# Patient Record
Sex: FEMALE | Race: BLACK OR AFRICAN AMERICAN | NOT HISPANIC OR LATINO | Employment: UNEMPLOYED | URBAN - METROPOLITAN AREA
[De-identification: names, ages, dates, MRNs, and addresses within clinical notes are randomized per-mention and may not be internally consistent; named-entity substitution may affect disease eponyms.]

---

## 2017-01-17 ENCOUNTER — TELEPHONE (OUTPATIENT)
Dept: GASTROENTEROLOGY | Facility: CLINIC | Age: 52
End: 2017-01-17

## 2017-01-17 ENCOUNTER — HOSPITAL ENCOUNTER (EMERGENCY)
Facility: HOSPITAL | Age: 52
Discharge: HOME | End: 2017-01-18
Attending: EMERGENCY MEDICINE
Payer: MEDICAID

## 2017-01-17 DIAGNOSIS — D64.9 SYMPTOMATIC ANEMIA: Primary | ICD-10-CM

## 2017-01-17 LAB
ABO + RH BLD: NORMAL
ALBUMIN SERPL BCP-MCNC: 2.9 G/DL
ALP SERPL-CCNC: 124 U/L
ALT SERPL W/O P-5'-P-CCNC: 13 U/L
ANION GAP SERPL CALC-SCNC: 8 MMOL/L
ANISOCYTOSIS BLD QL SMEAR: SLIGHT
APTT BLDCRRT: 29.4 SEC
AST SERPL-CCNC: 51 U/L
BASOPHILS # BLD AUTO: 0.03 K/UL
BASOPHILS NFR BLD: 0.5 %
BILIRUB SERPL-MCNC: 2.8 MG/DL
BLD GP AB SCN CELLS X3 SERPL QL: NORMAL
BUN SERPL-MCNC: 3 MG/DL
CALCIUM SERPL-MCNC: 8.2 MG/DL
CHLORIDE SERPL-SCNC: 106 MMOL/L
CO2 SERPL-SCNC: 26 MMOL/L
CREAT SERPL-MCNC: 0.6 MG/DL
DIFFERENTIAL METHOD: ABNORMAL
EOSINOPHIL # BLD AUTO: 0.1 K/UL
EOSINOPHIL NFR BLD: 1.3 %
ERYTHROCYTE [DISTWIDTH] IN BLOOD BY AUTOMATED COUNT: 19.1 %
EST. GFR  (AFRICAN AMERICAN): >60 ML/MIN/1.73 M^2
EST. GFR  (NON AFRICAN AMERICAN): >60 ML/MIN/1.73 M^2
GLUCOSE SERPL-MCNC: 104 MG/DL
HCT VFR BLD AUTO: 21.6 %
HGB BLD-MCNC: 6.1 G/DL
HYPOCHROMIA BLD QL SMEAR: ABNORMAL
INR PPP: 1.4
LYMPHOCYTES # BLD AUTO: 2.4 K/UL
LYMPHOCYTES NFR BLD: 39.4 %
MCH RBC QN AUTO: 16.8 PG
MCHC RBC AUTO-ENTMCNC: 28.2 %
MCV RBC AUTO: 60 FL
MONOCYTES # BLD AUTO: 0.6 K/UL
MONOCYTES NFR BLD: 9 %
NEUTROPHILS # BLD AUTO: 3 K/UL
NEUTROPHILS NFR BLD: 50 %
PLATELET # BLD AUTO: 76 K/UL
PLATELET BLD QL SMEAR: ABNORMAL
PMV BLD AUTO: ABNORMAL FL
POIKILOCYTOSIS BLD QL SMEAR: SLIGHT
POTASSIUM SERPL-SCNC: 3 MMOL/L
PROT SERPL-MCNC: 7.5 G/DL
PROTHROMBIN TIME: 13.9 SEC
RBC # BLD AUTO: 3.63 M/UL
SODIUM SERPL-SCNC: 140 MMOL/L
TARGETS BLD QL SMEAR: ABNORMAL
WBC # BLD AUTO: 6.09 K/UL

## 2017-01-17 PROCEDURE — 86920 COMPATIBILITY TEST SPIN: CPT

## 2017-01-17 PROCEDURE — 36430 TRANSFUSION BLD/BLD COMPNT: CPT

## 2017-01-17 PROCEDURE — 86901 BLOOD TYPING SEROLOGIC RH(D): CPT

## 2017-01-17 PROCEDURE — 85025 COMPLETE CBC W/AUTO DIFF WBC: CPT

## 2017-01-17 PROCEDURE — 80053 COMPREHEN METABOLIC PANEL: CPT

## 2017-01-17 PROCEDURE — 25000003 PHARM REV CODE 250

## 2017-01-17 PROCEDURE — 86900 BLOOD TYPING SEROLOGIC ABO: CPT

## 2017-01-17 PROCEDURE — 99284 EMERGENCY DEPT VISIT MOD MDM: CPT | Mod: 25

## 2017-01-17 PROCEDURE — 85730 THROMBOPLASTIN TIME PARTIAL: CPT

## 2017-01-17 PROCEDURE — P9016 RBC LEUKOCYTES REDUCED: HCPCS

## 2017-01-17 PROCEDURE — 85610 PROTHROMBIN TIME: CPT

## 2017-01-17 RX ORDER — HYDROCODONE BITARTRATE AND ACETAMINOPHEN 500; 5 MG/1; MG/1
TABLET ORAL
Status: DISCONTINUED | OUTPATIENT
Start: 2017-01-17 | End: 2017-01-18 | Stop reason: HOSPADM

## 2017-01-17 NOTE — ED AVS SNAPSHOT
[facility]  [id number] [place] [id number]               Marcie Bazan   2017  8:16 PM   ED    Description:  Female : 1965   Department:  [facility]           Your Care was Coordinated By:     Provider Role From To    Gurdeep Koehler MD Attending Provider 17 --      Reason for Visit     Anemia           Diagnoses this Visit        Comments    Symptomatic anemia    -  Primary       ED Disposition     ED Disposition Condition Comment    Discharge             To Do List           Follow-up Information     Follow up with Joselin Souza MD In 2 days.    Specialty:  Gastroenterology    Contact information:    [address]  [facility] [address]  [phone]          Follow up with [facility].    Specialty:  Emergency Medicine    Why:  As needed, If symptoms worsen    Contact information:    [address] [place] [id number]  [phone]      [facility]     [facility] -  Assistance  Registered nurses in the [facility] provide clinical advisement, health education, appointment booking, and other advisory services.  Call for this free service at [phone].             Medications           Message regarding Medications     Verify the changes and/or additions to your medication regime listed below are the same as discussed with your clinician today.  If any of these changes or additions are incorrect, please notify your healthcare provider.        These medications were administered today        Dose Freq    0.9%  NaCl infusion (for blood administration)  Every 24 hours as needed    Sig: Inject into the vein every 24 hours as needed for Blood Admin.    Class: Normal    Route: Intravenous      STOP taking these medications     erythromycin (ROMYCIN) ophthalmic ointment Place a 1/2 inch ribbon of ointment into the lower eyelid QID x 7 days     "potassium chloride (KLOR-CON) 10 MEQ TbSR Take 1 tablet (10 mEq total) by mouth once daily.    amoxicillin (AMOXIL) 500 MG capsule     clarithromycin (BIAXIN) 500 MG tablet     hydrochlorothiazide (HYDRODIURIL) 25 MG tablet Take 25 mg by mouth once daily.           Verify that the below list of medications is an accurate representation of the medications you are currently taking.  If none reported, the list may be blank. If incorrect, please contact your healthcare provider. Carry this list with you in case of emergency.           Current Medications     cyclobenzaprine (FLEXERIL) 10 MG tablet Take 10 mg by mouth.    omeprazole (PRILOSEC) 20 MG capsule Take 20 mg by mouth.    0.9%  NaCl infusion (for blood administration) Inject into the vein every 24 hours as needed for Blood Admin.    gabapentin (NEURONTIN) 300 MG capsule Take 300 mg by mouth.    lisinopril (PRINIVIL,ZESTRIL) 5 MG tablet Take 5 mg by mouth.           Clinical Reference Information           Your Vitals Were     BP Pulse Temp Resp Height Weight    144/73 79 98.5 °F (36.9 °C) (Oral) 18 5' 5" (1.651 m) 72.6 kg (160 lb)    SpO2 BMI             99% 26.63 kg/m2         Allergies as of 1/18/2017     No Known Allergies      Immunizations Administered on Date of Encounter - 1/18/2017     None      ED Micro, Lab, POCT     Start Ordered       Status Ordering Provider    01/17/17 2029 01/17/17 2028  CBC auto differential  STAT      Final result     01/17/17 2029 01/17/17 2028  Comprehensive metabolic panel  STAT      Final result     01/17/17 2029 01/17/17 2028  Protime-INR  STAT      Final result     01/17/17 2029 01/17/17 2028  APTT  STAT      Final result       ED Imaging Orders     None        Discharge Instructions         Anemia  Anemia is a condition that occurs when your body does not have enough healthy red blood cells (RBCs). Your RBCs are the parts of your blood that carry oxygen throughout your body. A protein called hemoglobin allows your RBCs to " absorb and release oxygen. Without enough RBCs or hemoglobin, your body doesn't get enough oxygen. Symptoms of anemia may then occur.    Symptoms of anemia  Some people with anemia have no symptoms. But most people have symptoms that range from mild to severe. These can include:  · Tiredness (fatigue)  · Weakness  · Pale skin  · Shortness of breath  · Dizziness or fainting  · Rapid heartbeat  · Trouble doing normal amounts of activity  · Jaundice (yellowing of your eyes, skin, or mouth; dark urine)  Causes of anemia  Anemia can occur when your body:  · Loses too much blood  · Does not make enough RBCs  · Destroys your RBCs at a faster rate than it can replace them  · Does not make a normal amount of hemoglobin in your RBCs  These problems can occur for many reasons, including:  · A condition that you are born with (congenital or inherited). This includes sickle cell disease or thalassemia.  · Heavy bleeding for any reason, including injury, surgery, childbirth, or even heavy menstrual periods.  · Being low in certain nutrients, such as iron, folate, or vitamin B12. This may be due to poor diet. Also, a condition like celiac disease or Crohn's disease can cause poor absorption of these nutrients  · Certain chronic conditions like diabetes, arthritis, or kidney disease.  · Certain chronic infections like tuberculosis or HIV.  · Exposure to certain medications, such as those used for chemotherapy.  There are different types of anemia. Your doctor can tell you more about the type of anemia you have and what may have caused it.  Diagnosing anemia  To diagnose anemia, your doctor gives you blood tests. These can include:  · Complete blood cell count (CBC). This test measures the amounts of the different types of blood cells.  · Blood smear. This test checks the size and shape of your blood cells. To perform the test, your doctor views a drop of your blood under a microscope. Your doctor uses a stain to make the blood  cells easier to see.  · Iron studies. These tests measure the amount of iron in your blood. Your body needs iron to make hemoglobin in your RBCs.  · Vitamin B12 and folate studies. These tests check for some of the components that help give RBCs a normal size and shape.  · Reticulocyte count. This test measures the amount of new RBCs that your bone marrow makes.  · Hemoglobin electrophoresis. This test checks for problems with your hemoglobin in RBCs.  Treating anemia  Treatment for anemia is based on the type of anemia, its cause, and the severity of your symptoms. Treatments may include:  · Diet changes. This involves increasing the amount of certain nutrients in your diet, such as iron, vitamin B12, or folate. Your doctor may also prescribe nutrient supplements.  · Medications. Certain medications treat the cause of your anemia. Others help build new RBCs or relieve symptoms. If a medication is the cause of your anemia, you may need to stop or change it.  · Blood transfusions. Replacing some of your blood can increase the number of healthy RBCs in your body.  · Surgery. In some cases, your doctor can do surgery to treat the underlying cause of anemia. If you need surgery, your doctor will explain the procedure and outline the risks and benefits for you.  Long-term concerns  If you have a certain type of anemia, you can expect a full recovery after treatment. If you have other types of anemia (especially a type you're born with), you will need to manage it for life. Your doctor can tell you more.  © 8080-7030 [employer]. [address] [place] [address], [place] [address]. All rights reserved. This information is not intended as a substitute for professional medical care. Always follow your healthcare professional's instructions.          Blood Transfusion Reaction Signs and Symptoms     The blood you have received has been matched for you as carefully as possible. Most patients who receive a blood  transfusion do not experience problems. However, there can be a delayed reaction that happens a few weeks after your blood transfusion. Contact your physician immediately if you experience any NEW SYMPTOMS listed below:     Fever greater than 100.4 degrees    Chills   Yellow color to your skin or eyes(Jaundice)   Back pain, chest pain, or pain at the infusion site   Weakness (more than usual)   Discomfort or uneasiness more than usual (Malaise)   Nausea or vomiting   Shortness of breath, wheezing, or coughing   Higher or lower blood pressure than normal   Skin rash, itching, skin redness, or localized swelling (example: hands or feet)   Urinating less than normal   Urine appears reddish or orange and is darker than normal      Remember that some these signs may already exist for you--such as having chronic back pain or high blood pressure. You only need to look for and report to your doctor any new occurrences since your blood transfusion that are of concern.        [facility] Sign-Up     Activating your [facility] account is as easy as 1-2-3!     1) Visit [email or web address], select Sign Up Now, enter this activation code and your date of birth, then select Next.  [email or web address]  Expires: 3/4/2017  1:10 AM      2) Create a username and password to use when you visit [facility] in the future and select a security question in case you lose your password and select Next.    3) Enter your e-mail address and click Sign Up!    Additional Information  If you have questions, please e-mail [email or web address] or call [phone] to talk to our [facility] staff. Remember, [facility] is NOT to be used for urgent needs. For medical emergencies, dial 911.          [facility] complies with applicable Federal civil rights laws and does not discriminate on the basis of race, color, national origin, age, disability, or sex.        Language Assistance Services     ATTENTION: Language assistance services are  available, free of charge. Please call [phone].      ATENCIÓN: Si habla español, tiene a pearson disposición servicios gratuitos de asistencia lingüística. Llame al [phone].     CHÚ Ý: N?u b?n nói Ti?ng Vi?t, có các d?ch v? h? tr? ngôn ng? mi?n phí dành cho b?n. G?i s? [phone].

## 2017-01-17 NOTE — TELEPHONE ENCOUNTER
Spoke with patient about results from [facility] clinic of Hb 6.7 she is symptomatic with SOB with PERKINS cirrhosis so advised she go to the ED for eval. Denies GIB but reports nosebleeds and hemoptysis.  She will go to the [facility] near where she lives.      [facility]  EGD 7/2016-esophagitis, small esophageal varices; dudoenitis    Colon 4/2015- diminutive polyps, hemorrhoids

## 2017-01-18 ENCOUNTER — TELEPHONE (OUTPATIENT)
Dept: GASTROENTEROLOGY | Facility: CLINIC | Age: 52
End: 2017-01-18

## 2017-01-18 VITALS
HEIGHT: 65 IN | HEART RATE: 81 BPM | WEIGHT: 160 LBS | SYSTOLIC BLOOD PRESSURE: 152 MMHG | OXYGEN SATURATION: 99 % | TEMPERATURE: 99 F | BODY MASS INDEX: 26.66 KG/M2 | DIASTOLIC BLOOD PRESSURE: 70 MMHG | RESPIRATION RATE: 20 BRPM

## 2017-01-18 LAB
BLD PROD TYP BPU: NORMAL
BLD PROD TYP BPU: NORMAL
BLOOD UNIT EXPIRATION DATE: NORMAL
BLOOD UNIT EXPIRATION DATE: NORMAL
BLOOD UNIT TYPE CODE: 600
BLOOD UNIT TYPE CODE: 600
BLOOD UNIT TYPE: NORMAL
BLOOD UNIT TYPE: NORMAL
CODING SYSTEM: NORMAL
CODING SYSTEM: NORMAL
DISPENSE STATUS: NORMAL
DISPENSE STATUS: NORMAL
NUM UNITS TRANS PACKED RBC: NORMAL
NUM UNITS TRANS PACKED RBC: NORMAL

## 2017-01-18 PROCEDURE — 25000003 PHARM REV CODE 250: Performed by: EMERGENCY MEDICINE

## 2017-01-18 PROCEDURE — P9016 RBC LEUKOCYTES REDUCED: HCPCS

## 2017-01-18 RX ORDER — ONDANSETRON 4 MG/1
4 TABLET, ORALLY DISINTEGRATING ORAL
Status: COMPLETED | OUTPATIENT
Start: 2017-01-18 | End: 2017-01-18

## 2017-01-18 RX ADMIN — ONDANSETRON 4 MG: 4 TABLET, ORALLY DISINTEGRATING ORAL at 02:01

## 2017-01-18 NOTE — ED PROVIDER NOTES
"SCRIBE #1 NOTE: I, José Adams, am scribing for, and in the presence of, Gurdeep Koehler MD. I have scribed the entire note.      History      Chief Complaint   Patient presents with    Anemia     " i think in need a blood transfusion" reports fatigue       Review of patient's allergies indicates:  No Known Allergies     HPI   HPI    1/17/2017, 8:26 PM   History obtained from the patient      History of Present Illness: Marcie Bazan is a 51 y.o. female patient who presents to the Emergency Department, sent by Dr. Souza () for possible blood transfusion. Pt has been complaining of fatigue which onset gradually 1 week ago. Sxs are constant and moderate in severity. There are no mitigating or exacerbating factors noted. Associated sxs include episodic nosebleeds.  Pt denies any fever, N/V/D, blood in stool, hematuria, vaginal bleeding, hematemesis, HA, CP, abd pain, and all other sxs at this time. Pt reports having blood drawn at [facility] clinic on 1/10 and was called today to come to ED for hemoglobin of 6.7. No further complaints or concerns at this time.     Arrival mode: Personal vehicle     PCP: ODILIA Wall       Past Medical History:  Past Medical History   Diagnosis Date    Hypertension        Past Surgical History:  Past Surgical History   Procedure Laterality Date    Cholecystectomy      Hysterectomy      Breast surgery           Family History:  Family History   Problem Relation Age of Onset    Hypertension Mother     Hypertension Father     Diabetes Father     Diabetes Sister     Depression Maternal Grandfather        Social History:  Social History     Social History Main Topics    Smoking status: Never Smoker    Smokeless tobacco: Unknown    Alcohol use Yes      Comment: occasionally    Drug use: No    Sexual activity: Unknown       ROS   Review of Systems   Constitutional: Positive for fatigue. Negative for fever.   HENT: Positive for nosebleeds. Negative for sore " throat.    Respiratory: Negative for shortness of breath.    Cardiovascular: Negative for chest pain.   Gastrointestinal: Negative for abdominal pain, blood in stool, diarrhea, nausea and vomiting.   Genitourinary: Negative for dysuria, hematuria and vaginal bleeding.   Musculoskeletal: Negative for back pain.   Skin: Negative for rash.   Neurological: Negative for weakness.   Hematological: Does not bruise/bleed easily.       Physical Exam    Initial Vitals   BP Pulse Resp Temp SpO2   01/17/17 1911 01/17/17 1911 01/17/17 1911 01/17/17 1911 01/17/17 1911   149/78 98 19 98.9 °F (37.2 °C) 100 %      Physical Exam  Nursing Notes and Vital Signs Reviewed.  Constitutional: Patient is in no acute distress. Awake and alert. Well-developed and well-nourished.  Head: Atraumatic. Normocephalic.  Eyes: PERRL. EOM intact. Conjunctivae are not pale. No scleral icterus.  ENT: Mucous membranes are moist. Oropharynx is clear and symmetric.    Neck: Supple. Full ROM. No lymphadenopathy.  Cardiovascular: Regular rate. Regular rhythm. No murmurs, rubs, or gallops. Distal pulses are 2+ and symmetric.  Pulmonary/Chest: No respiratory distress. Clear to auscultation bilaterally. No wheezing, rales, or rhonchi.  Abdominal: Soft and non-distended.  There is no tenderness.  No rebound, guarding, or rigidity.  Good bowel sounds.    Genitourinary: No CVA tenderness  Musculoskeletal: Moves all extremities. No obvious deformities. No edema. No calf tenderness.  Skin: Warm and dry. Pale.   Neurological:  Alert, awake, and appropriate.  Normal speech.  No acute focal neurological deficits are appreciated.  Psychiatric: Normal affect. Good eye contact. Appropriate in content.    ED Course    Critical Care  Date/Time: 1/17/2017 11:45 PM  Performed by: EVELIA ALDANA.  Authorized by: EVELIA ALDANA.   Direct patient critical care time: 15 minutes  Additional history critical care time: 10 minutes  Ordering / reviewing critical care time: 5  "minutes  Documentation critical care time: 10 minutes  Total critical care time (exclusive of procedural time) : 40 minutes  Critical care time was exclusive of separately billable procedures and treating other patients and teaching time.  Critical care was necessary to treat or prevent imminent or life-threatening deterioration of the following conditions: circulatory failure (anemia).  Critical care was time spent personally by me on the following activities: blood draw for specimens, development of treatment plan with patient or surrogate, interpretation of cardiac output measurements, evaluation of patient's response to treatment, examination of patient, obtaining history from patient or surrogate, ordering and performing treatments and interventions, ordering and review of laboratory studies, pulse oximetry, re-evaluation of patient's condition, review of old charts and vascular access procedures.        ED Vital Signs:  Vitals:    01/17/17 1911 01/17/17 2100 01/17/17 2104 01/17/17 2209   BP: (!) 149/78  137/71 138/85   Pulse: 98 81 81 79   Resp: 19 17 19   Temp: 98.9 °F (37.2 °C)      TempSrc: Oral      SpO2: 100%  100% 100%   Weight: 72.6 kg (160 lb)      Height: 5' 5" (1.651 m)       01/17/17 2240 01/17/17 2255 01/17/17 2341 01/18/17 0040   BP: (!) 150/73 132/67 (!) 153/79 (!) 153/79   Pulse: 78 79 88 78   Resp: 20 18 17 19   Temp: 98.4 °F (36.9 °C) 98.5 °F (36.9 °C) 98.8 °F (37.1 °C) 98.4 °F (36.9 °C)   TempSrc: Oral Oral Oral Oral   SpO2: 100% 100% 99% 100%   Weight:       Height:        01/18/17 0045 01/18/17 0101 01/18/17 0145 01/18/17 0241   BP: (!) 150/74 (!) 144/73 (!) 144/80 (!) 166/84   Pulse: 80 79 83 85   Resp: 20 18 19 14   Temp: 98.4 °F (36.9 °C) 98.5 °F (36.9 °C) 98.6 °F (37 °C)    TempSrc: Oral Oral Oral    SpO2: 100% 99% 99% 98%   Weight:       Height:        01/18/17 0324 01/18/17 0342   BP: (!) 156/82 (!) 152/70   Pulse: 82 81   Resp: 18 20   Temp: 98.7 °F (37.1 °C)    TempSrc: Oral  "   SpO2: 100% 99%   Weight:     Height:         Abnormal Lab Results:  Labs Reviewed   CBC W/ AUTO DIFFERENTIAL - Abnormal; Notable for the following:        Result Value    RBC 3.63 (*)     Hemoglobin 6.1 (*)     Hematocrit 21.6 (*)     MCV 60 (*)     MCH 16.8 (*)     MCHC 28.2 (*)     RDW 19.1 (*)     Platelets 76 (*)     Platelet Estimate Decreased (*)     All other components within normal limits   COMPREHENSIVE METABOLIC PANEL - Abnormal; Notable for the following:     Potassium 3.0 (*)     BUN, Bld 3 (*)     Calcium 8.2 (*)     Albumin 2.9 (*)     Total Bilirubin 2.8 (*)     AST 51 (*)     All other components within normal limits   PROTIME-INR - Abnormal; Notable for the following:     Prothrombin Time 13.9 (*)     INR 1.4 (*)     All other components within normal limits   APTT   TYPE & SCREEN   PREPARE RBC SOFT        All Lab Results:  Results for orders placed or performed during the hospital encounter of 01/17/17   CBC auto differential   Result Value Ref Range    WBC 6.09 3.90 - 12.70 K/uL    RBC 3.63 (L) 4.00 - 5.40 M/uL    Hemoglobin 6.1 (L) 12.0 - 16.0 g/dL    Hematocrit 21.6 (L) 37.0 - 48.5 %    MCV 60 (L) 82 - 98 fL    MCH 16.8 (L) 27.0 - 31.0 pg    MCHC 28.2 (L) 32.0 - 36.0 %    RDW 19.1 (H) 11.5 - 14.5 %    Platelets 76 (L) 150 - 350 K/uL    MPV SEE COMMENT 9.2 - 12.9 fL    Gran # 3.0 1.8 - 7.7 K/uL    Lymph # 2.4 1.0 - 4.8 K/uL    Mono # 0.6 0.3 - 1.0 K/uL    Eos # 0.1 0.0 - 0.5 K/uL    Baso # 0.03 0.00 - 0.20 K/uL    Gran% 50.0 38.0 - 73.0 %    Lymph% 39.4 18.0 - 48.0 %    Mono% 9.0 4.0 - 15.0 %    Eosinophil% 1.3 0.0 - 8.0 %    Basophil% 0.5 0.0 - 1.9 %    Platelet Estimate Decreased (A)     Aniso Slight     Poik Slight     Hypo Moderate     Target Cells Occasional     Differential Method Automated    Comprehensive metabolic panel   Result Value Ref Range    Sodium 140 136 - 145 mmol/L    Potassium 3.0 (L) 3.5 - 5.1 mmol/L    Chloride 106 95 - 110 mmol/L    CO2 26 23 - 29 mmol/L    Glucose 104  70 - 110 mg/dL    BUN, Bld 3 (L) 6 - 20 mg/dL    Creatinine 0.6 0.5 - 1.4 mg/dL    Calcium 8.2 (L) 8.7 - 10.5 mg/dL    Total Protein 7.5 6.0 - 8.4 g/dL    Albumin 2.9 (L) 3.5 - 5.2 g/dL    Total Bilirubin 2.8 (H) 0.1 - 1.0 mg/dL    Alkaline Phosphatase 124 55 - 135 U/L    AST 51 (H) 10 - 40 U/L    ALT 13 10 - 44 U/L    Anion Gap 8 8 - 16 mmol/L    eGFR if African American >60 >60 mL/min/1.73 m^2    eGFR if non African American >60 >60 mL/min/1.73 m^2   Protime-INR   Result Value Ref Range    Prothrombin Time 13.9 (H) 9.0 - 12.5 sec    INR 1.4 (H) 0.8 - 1.2   APTT   Result Value Ref Range    aPTT 29.4 21.0 - 32.0 sec   Type & Screen   Result Value Ref Range    Group & Rh A NEG     Indirect Donato NEG    Prepare RBC 2 Units   Result Value Ref Range    UNIT NUMBER [id number]     PRODUCT CODE U7092Z66     DISPENSE STATUS TRANSFUSED     CODING SYSTEM PUFU947     Unit Blood Type Code 0600     Unit Blood Type A NEG     Unit Expiration [id number]     UNIT NUMBER [id number]     PRODUCT CODE P8510J37     DISPENSE STATUS ISSUED     CODING SYSTEM [id number]     Unit Blood Type Code 0600     Unit Blood Type A NEG     Unit Expiration [id number]                The Emergency Provider reviewed the vital signs and test results, which are outlined above.    ED Discussion     12:11 AM: Reassessed pt. Pt states their condition has improved at this time.  Discussed with pt all pertinent ED information and results. Discussed plan of treatment with pt. Gave pt all f/u and return to the ED instructions. All questions and concerns were addressed at this time. Pt understands and agrees to plan as discussed. Pt is stable for discharge.       ED Medication(s):  Medications   ondansetron disintegrating tablet 4 mg (4 mg Oral Given 1/18/17 0244)       Discharge Medication List as of 1/18/2017  1:10 AM          Follow-up Information     Follow up with Joselin Souza MD In 2 days.    Specialty:  Gastroenterology    Contact information:    [address]  [facility] [address]  [phone]          Follow up with [facility].    Specialty:  Emergency Medicine    Why:  As needed, If symptoms worsen    Contact information:    [address]  [facility] [address] [phone]            Medical Decision Making    Medical Decision Making:   Clinical Tests:   Lab Tests: Ordered and Reviewed           Scribe Attestation:   Scribe #1: I performed the above scribed service and the documentation accurately describes the services I performed. I attest to the accuracy of the note.    Attending:   Physician Attestation Statement for Scribe #1: I, uGrdeep Koehler MD, personally performed the services described in this documentation, as scribed by José Adams, in my presence, and it is both accurate and complete.          Clinical Impression       ICD-10-CM ICD-9-CM   1. Symptomatic anemia D64.9 285.9       Disposition:   Disposition: Discharged  Condition: Stable         Gurdeep Koehler MD  01/18/17 2031

## 2017-01-18 NOTE — DISCHARGE INSTRUCTIONS
Anemia  Anemia is a condition that occurs when your body does not have enough healthy red blood cells (RBCs). Your RBCs are the parts of your blood that carry oxygen throughout your body. A protein called hemoglobin allows your RBCs to absorb and release oxygen. Without enough RBCs or hemoglobin, your body doesn't get enough oxygen. Symptoms of anemia may then occur.    Symptoms of anemia  Some people with anemia have no symptoms. But most people have symptoms that range from mild to severe. These can include:  · Tiredness (fatigue)  · Weakness  · Pale skin  · Shortness of breath  · Dizziness or fainting  · Rapid heartbeat  · Trouble doing normal amounts of activity  · Jaundice (yellowing of your eyes, skin, or mouth; dark urine)  Causes of anemia  Anemia can occur when your body:  · Loses too much blood  · Does not make enough RBCs  · Destroys your RBCs at a faster rate than it can replace them  · Does not make a normal amount of hemoglobin in your RBCs  These problems can occur for many reasons, including:  · A condition that you are born with (congenital or inherited). This includes sickle cell disease or thalassemia.  · Heavy bleeding for any reason, including injury, surgery, childbirth, or even heavy menstrual periods.  · Being low in certain nutrients, such as iron, folate, or vitamin B12. This may be due to poor diet. Also, a condition like celiac disease or Crohn's disease can cause poor absorption of these nutrients  · Certain chronic conditions like diabetes, arthritis, or kidney disease.  · Certain chronic infections like tuberculosis or HIV.  · Exposure to certain medications, such as those used for chemotherapy.  There are different types of anemia. Your doctor can tell you more about the type of anemia you have and what may have caused it.  Diagnosing anemia  To diagnose anemia, your doctor gives you blood tests. These can include:  · Complete blood cell count (CBC). This test measures the amounts  of the different types of blood cells.  · Blood smear. This test checks the size and shape of your blood cells. To perform the test, your doctor views a drop of your blood under a microscope. Your doctor uses a stain to make the blood cells easier to see.  · Iron studies. These tests measure the amount of iron in your blood. Your body needs iron to make hemoglobin in your RBCs.  · Vitamin B12 and folate studies. These tests check for some of the components that help give RBCs a normal size and shape.  · Reticulocyte count. This test measures the amount of new RBCs that your bone marrow makes.  · Hemoglobin electrophoresis. This test checks for problems with your hemoglobin in RBCs.  Treating anemia  Treatment for anemia is based on the type of anemia, its cause, and the severity of your symptoms. Treatments may include:  · Diet changes. This involves increasing the amount of certain nutrients in your diet, such as iron, vitamin B12, or folate. Your doctor may also prescribe nutrient supplements.  · Medications. Certain medications treat the cause of your anemia. Others help build new RBCs or relieve symptoms. If a medication is the cause of your anemia, you may need to stop or change it.  · Blood transfusions. Replacing some of your blood can increase the number of healthy RBCs in your body.  · Surgery. In some cases, your doctor can do surgery to treat the underlying cause of anemia. If you need surgery, your doctor will explain the procedure and outline the risks and benefits for you.  Long-term concerns  If you have a certain type of anemia, you can expect a full recovery after treatment. If you have other types of anemia (especially a type you're born with), you will need to manage it for life. Your doctor can tell you more.  © 7744-0882 [employer]. [address] [place] [address], [place] [address]. All rights reserved. This information is not intended as a substitute for professional medical care. Always  follow your healthcare professional's instructions.

## 2017-01-18 NOTE — ED NOTES
Pt to ED after being told by doctor her H&H was low and she would need a transfusion. Patient reports being non compliant with blood pressure medication and potassium supplement

## 2017-01-18 NOTE — ED NOTES
Pt resting in bed. NAD, VSS, RR equal and unlabored. Will continue to monitor. Blood transfusion currently in progress.

## 2017-01-18 NOTE — ED NOTES
Patient in bed in no acute distress, post transfusion flush currently in process. Patient arranging transportation for discharge. Will continue to monitor.

## 2017-01-20 ENCOUNTER — TELEPHONE (OUTPATIENT)
Dept: GASTROENTEROLOGY | Facility: CLINIC | Age: 52
End: 2017-01-20

## 2017-01-20 NOTE — TELEPHONE ENCOUNTER
----- Message from Liza Anguiano sent at 1/18/2017  9:06 AM CST -----  Contact: pt  Call pt at [phone]//pt was discharge from the hospital this morning and was told to call to see Dr Souza/Dr Souza was the one to tell her to go to hospital yesterday//thks malachi

## 2017-01-20 NOTE — TELEPHONE ENCOUNTER
Returned call to pt who stated she does not need a f/u appt at this time but was a little confused about her medications. Pt asked what each of her medications were for and verbalized understanding of explanation. Pt will call to schedule f/u when she is ready.

## 2017-05-08 ENCOUNTER — HOSPITAL ENCOUNTER (EMERGENCY)
Facility: HOSPITAL | Age: 52
Discharge: HOME | End: 2017-05-09
Payer: MEDICAID

## 2017-05-08 VITALS
BODY MASS INDEX: 29.32 KG/M2 | WEIGHT: 176 LBS | RESPIRATION RATE: 20 BRPM | HEIGHT: 65 IN | TEMPERATURE: 99 F | OXYGEN SATURATION: 97 % | DIASTOLIC BLOOD PRESSURE: 84 MMHG | SYSTOLIC BLOOD PRESSURE: 145 MMHG | HEART RATE: 74 BPM

## 2017-05-08 DIAGNOSIS — K29.70 GASTRITIS WITHOUT BLEEDING, UNSPECIFIED CHRONICITY, UNSPECIFIED GASTRITIS TYPE: Primary | ICD-10-CM

## 2017-05-08 DIAGNOSIS — R10.9 ABDOMINAL PAIN: ICD-10-CM

## 2017-05-08 DIAGNOSIS — D63.8 ANEMIA OF CHRONIC DISORDER: ICD-10-CM

## 2017-05-08 LAB
ALBUMIN SERPL BCP-MCNC: 3 G/DL
ALP SERPL-CCNC: 141 U/L
ALT SERPL W/O P-5'-P-CCNC: 22 U/L
AMYLASE SERPL-CCNC: 35 U/L
ANION GAP SERPL CALC-SCNC: 11 MMOL/L
ANISOCYTOSIS BLD QL SMEAR: SLIGHT
AST SERPL-CCNC: 70 U/L
BASOPHILS # BLD AUTO: 0.03 K/UL
BASOPHILS NFR BLD: 0.3 %
BILIRUB SERPL-MCNC: 7.6 MG/DL
BUN SERPL-MCNC: 7 MG/DL
CALCIUM SERPL-MCNC: 8.5 MG/DL
CHLORIDE SERPL-SCNC: 100 MMOL/L
CO2 SERPL-SCNC: 26 MMOL/L
CREAT SERPL-MCNC: 0.7 MG/DL
DIFFERENTIAL METHOD: ABNORMAL
EOSINOPHIL # BLD AUTO: 0 K/UL
EOSINOPHIL NFR BLD: 0.3 %
ERYTHROCYTE [DISTWIDTH] IN BLOOD BY AUTOMATED COUNT: 20 %
EST. GFR  (AFRICAN AMERICAN): >60 ML/MIN/1.73 M^2
EST. GFR  (NON AFRICAN AMERICAN): >60 ML/MIN/1.73 M^2
GLUCOSE SERPL-MCNC: 109 MG/DL
HCT VFR BLD AUTO: 26.8 %
HGB BLD-MCNC: 8.1 G/DL
HYPOCHROMIA BLD QL SMEAR: ABNORMAL
LIPASE SERPL-CCNC: 5 U/L
LYMPHOCYTES # BLD AUTO: 1.8 K/UL
LYMPHOCYTES NFR BLD: 19.1 %
MCH RBC QN AUTO: 19.6 PG
MCHC RBC AUTO-ENTMCNC: 30.2 %
MCV RBC AUTO: 65 FL
MONOCYTES # BLD AUTO: 0.7 K/UL
MONOCYTES NFR BLD: 7.2 %
NEUTROPHILS # BLD AUTO: 7 K/UL
NEUTROPHILS NFR BLD: 73.3 %
PLATELET # BLD AUTO: 76 K/UL
PLATELET BLD QL SMEAR: ABNORMAL
PMV BLD AUTO: ABNORMAL FL
POIKILOCYTOSIS BLD QL SMEAR: ABNORMAL
POLYCHROMASIA BLD QL SMEAR: ABNORMAL
POTASSIUM SERPL-SCNC: 3.3 MMOL/L
PROT SERPL-MCNC: 8.4 G/DL
RBC # BLD AUTO: 4.14 M/UL
SODIUM SERPL-SCNC: 137 MMOL/L
TARGETS BLD QL SMEAR: ABNORMAL
WBC # BLD AUTO: 9.6 K/UL

## 2017-05-08 PROCEDURE — 82150 ASSAY OF AMYLASE: CPT

## 2017-05-08 PROCEDURE — 85025 COMPLETE CBC W/AUTO DIFF WBC: CPT

## 2017-05-08 PROCEDURE — 83690 ASSAY OF LIPASE: CPT

## 2017-05-08 PROCEDURE — 96361 HYDRATE IV INFUSION ADD-ON: CPT

## 2017-05-08 PROCEDURE — 99284 EMERGENCY DEPT VISIT MOD MDM: CPT | Mod: 25

## 2017-05-08 PROCEDURE — 25000003 PHARM REV CODE 250: Performed by: PHYSICIAN ASSISTANT

## 2017-05-08 PROCEDURE — 80053 COMPREHEN METABOLIC PANEL: CPT

## 2017-05-08 PROCEDURE — 63600175 PHARM REV CODE 636 W HCPCS: Performed by: PHYSICIAN ASSISTANT

## 2017-05-08 PROCEDURE — 96374 THER/PROPH/DIAG INJ IV PUSH: CPT

## 2017-05-08 RX ORDER — ONDANSETRON 4 MG/1
4 TABLET, FILM COATED ORAL EVERY 12 HOURS PRN
Qty: 30 TABLET | Refills: 0 | Status: SHIPPED | OUTPATIENT
Start: 2017-05-08 | End: 2017-09-11 | Stop reason: ALTCHOICE

## 2017-05-08 RX ORDER — PROMETHAZINE HYDROCHLORIDE 25 MG/1
25 TABLET ORAL EVERY 6 HOURS PRN
Qty: 30 TABLET | Refills: 0 | Status: SHIPPED | OUTPATIENT
Start: 2017-05-08 | End: 2017-09-11 | Stop reason: ALTCHOICE

## 2017-05-08 RX ORDER — DICYCLOMINE HYDROCHLORIDE 20 MG/1
20 TABLET ORAL 2 TIMES DAILY
Qty: 20 TABLET | Refills: 0 | Status: SHIPPED | OUTPATIENT
Start: 2017-05-08 | End: 2017-06-07

## 2017-05-08 RX ORDER — ONDANSETRON 2 MG/ML
4 INJECTION INTRAMUSCULAR; INTRAVENOUS
Status: COMPLETED | OUTPATIENT
Start: 2017-05-08 | End: 2017-05-08

## 2017-05-08 RX ADMIN — ONDANSETRON 4 MG: 2 INJECTION INTRAMUSCULAR; INTRAVENOUS at 09:05

## 2017-05-08 RX ADMIN — SODIUM CHLORIDE 1000 ML: 0.9 INJECTION, SOLUTION INTRAVENOUS at 09:05

## 2017-05-08 NOTE — ED AVS SNAPSHOT
[facility]  [address] [facility] [address]               Marcie Bazan   2017  9:01 PM   ED    Description:  Female : 1965   Department:  [facility]            Your Care was Coordinated By:     Provider Role From To    DEMETRIUS Andrea Physician Assistant 17 2100 --      Reason for Visit     Emesis           Diagnoses this Visit        Comments    Gastritis without bleeding, unspecified chronicity, unspecified gastritis type    -  Primary     Abdominal pain         Anemia of chronic disorder           ED Disposition     None           To Do List           Follow-up Information     Follow up with ODILIA Wall In 2 days.    Specialty:  Family Medicine    Why:  As needed, If symptoms worsen return to ED     Contact information:    [address] [facility] [address]  [phone]         These Medications        Disp Refills Start End    ondansetron (ZOFRAN) 4 MG tablet 30 tablet 0 2017     Take 1 tablet (4 mg total) by mouth every 12 (twelve) hours as needed. - Oral    promethazine (PHENERGAN) 25 MG tablet 30 tablet 0 2017     Take 1 tablet (25 mg total) by mouth every 6 (six) hours as needed for Nausea. - Oral    dicyclomine (BENTYL) 20 mg tablet 20 tablet 0 2017    Take 1 tablet (20 mg total) by mouth 2 (two) times daily. - Oral      [facility]     [facility] - 24/7 Assistance  Unless otherwise directed by your provider, please contact [facility], our nurse care line that is available for 24/7 assistance.     Registered nurses in the [facility] provide: appointment scheduling, clinical advisement, health education, and other advisory services.  Call: [phone] (toll free)               Medications           Message regarding Medications     Verify the changes and/or additions to your medication regime listed below are the  same as discussed with your clinician today.  If any of these changes or additions are incorrect, please notify your healthcare provider.        START taking these NEW medications        Refills    ondansetron (ZOFRAN) 4 MG tablet 0    Sig: Take 1 tablet (4 mg total) by mouth every 12 (twelve) hours as needed.    Class: Print    Route: Oral    promethazine (PHENERGAN) 25 MG tablet 0    Sig: Take 1 tablet (25 mg total) by mouth every 6 (six) hours as needed for Nausea.    Class: Print    Route: Oral    dicyclomine (BENTYL) 20 mg tablet 0    Sig: Take 1 tablet (20 mg total) by mouth 2 (two) times daily.    Class: Print    Route: Oral      These medications were administered today        Dose Freq    sodium chloride 0.9% bolus 1,000 mL 1,000 mL ED 1 Time    Sig: Inject 1,000 mLs into the vein ED 1 Time.    Class: Normal    Route: Intravenous    Cosign for Ordering: Required by Alden Schneider MD    ondansetron injection 4 mg 4 mg ED 1 Time    Sig: Inject 4 mg into the vein ED 1 Time.    Class: Normal    Route: Intravenous    Cosign for Ordering: Required by Alden Schneider MD           Verify that the below list of medications is an accurate representation of the medications you are currently taking.  If none reported, the list may be blank. If incorrect, please contact your healthcare provider. Carry this list with you in case of emergency.           Current Medications     cyclobenzaprine (FLEXERIL) 10 MG tablet Take 10 mg by mouth.    dicyclomine (BENTYL) 20 mg tablet Take 1 tablet (20 mg total) by mouth 2 (two) times daily.    gabapentin (NEURONTIN) 300 MG capsule Take 300 mg by mouth.    lisinopril (PRINIVIL,ZESTRIL) 5 MG tablet Take 5 mg by mouth.    omeprazole (PRILOSEC) 20 MG capsule Take 20 mg by mouth.    ondansetron (ZOFRAN) 4 MG tablet Take 1 tablet (4 mg total) by mouth every 12 (twelve) hours as needed.    promethazine (PHENERGAN) 25 MG tablet Take 1 tablet (25 mg total) by mouth every 6 (six)  "hours as needed for Nausea.           Clinical Reference Information           Your Vitals Were     BP Pulse Temp Resp Height Weight    159/81 (BP Location: Right arm, Patient Position: Sitting) 100 98.7 °F (37.1 °C) (Oral) 20 5' 5" (1.651 m) 79.8 kg (176 lb)    SpO2 BMI             99% 29.29 kg/m2         Allergies as of 5/8/2017     No Known Allergies      Immunizations Administered on Date of Encounter - 5/8/2017     None      ED Micro, Lab, POCT     Start Ordered       Status Ordering Provider    05/08/17 2127 05/08/17 2127  Amylase  STAT      Final result     05/08/17 2127 05/08/17 2127  Lipase  STAT      Final result     05/08/17 2126 05/08/17 2127  CBC auto differential  STAT      Preliminary result     05/08/17 2126 05/08/17 2127  Comprehensive metabolic panel  STAT      Final result       ED Imaging Orders     Start Ordered       Status Ordering Provider    05/08/17 2129 05/08/17 2128  X-Ray Abdomen Flat And Erect  1 time imaging      Final result         Discharge Instructions         Abdominal Pain    Abdominal pain is pain in the stomach or belly area. Everyone has this pain from time to time. In many cases it goes away on its own. But abdominal pain can sometimes be due to a serious problem, such as appendicitis. So its important to know when to seek help.  Causes of abdominal pain  There are many possible causes of abdominal pain. Common causes in adults include:  · Constipation, diarrhea, or gas  · Stomach acid flowing back up into the esophagus (acid reflux or heartburn)  · Severe acid reflux, called GERD (gastroesophageal reflux disease)  · A sore in the lining of the stomach or small intestine (peptic ulcer)  · Inflammation of the gallbladder, liver, or pancreas  · Gallstones or kidney stones  · Appendicitis   · Intestinal blockage   · An internal organ pushing through a muscle or other tissue (hernia)  · Urinary tract infections  · In women, menstrual cramps, fibroids, or " endometriosis  · Inflammation or infection of the intestines  Diagnosing the cause of abdominal pain  Your healthcare provider will do a physical exam help find the cause of your pain. If needed, tests will be ordered. Belly pain has many possible causes. So it can be hard to find the reason for your pain. Giving details about your pain can help. Tell your provider where and when you feel the pain, and what makes it better or worse. Also let your provider know if you have other symptoms such as:  · Fever  · Tiredness  · Upset stomach (nausea)  · Vomiting  · Changes in bathroom habits  Treating abdominal pain  Some causes of pain need emergency medical treatment right away. These include appendicitis or a bowel blockage. Other problems can be treated with rest, fluids, or medicines. Your healthcare provider can give you specific instructions for treatment or self-care based on what is causing your pain.  If you have vomiting or diarrhea, sip water or other clear fluids. When you are ready to eat solid foods again, start with small amounts of easy-to-digest, low-fat foods. These include apple sauce, toast, or crackers.   When to seek medical care  Call 911 or go to the hospital right away if you:  · Cant pass stool and are vomiting  · Are vomiting blood or have bloody diarrhea or black, tarry diarrhea  · Have chest, neck, or shoulder pain  · Feel like you might pass out  · Have pain in your shoulder blades with nausea  · Have sudden, severe belly pain  · Have new, severe pain unlike any you have felt before  · Have a belly that is rigid, hard, and tender to touch  Call your healthcare provider if you have:  · Pain for more than 5 days  · Bloating for more than 2 days  · Diarrhea for more than 5 days  · A fever of 100.4°F (38.0°C) or higher, or as directed by your provider  · Pain that gets worse  · Weight loss for no reason  · Continued lack of appetite  · Blood in your stool  How to prevent abdominal pain  Here are  some tips to help prevent abdominal pain:  · Eat smaller amounts of food at one time.  · Avoid greasy, fried, or other high-fat foods.  · Avoid foods that give you gas.  · Exercise regularly.  · Drink plenty of fluids.  To help prevent GERD symptoms:  · Quit smoking.  · Reduce alcohol and certain foods that increase stomach acid.  · Avoid aspirin and over-the-counter pain and fever medicines (NSAIDS or nonsteroidal anti-inflammatory drugs), if possible  · Lose extra weight.  · Finish eating at least 2 hours before you go to bed or lie down.  · Raise the head of your bed.  Date Last Reviewed: 7/1/2016  © 1323-2420 [employer]. [address] [place] [address], [place] [address]. All rights reserved. This information is not intended as a substitute for professional medical care. Always follow your healthcare professional's instructions.          [facility] Sign-Up     Activating your [facility] account is as easy as 1-2-3!     1) Visit [email or web address], select Sign Up Now, enter this activation code and your date of birth, then select Next.  [email or web address]  Expires: 6/22/2017 11:03 PM      2) Create a username and password to use when you visit [facility] in the future and select a security question in case you lose your password and select Next.    3) Enter your e-mail address and click Sign Up!    Additional Information  If you have questions, please e-mail [email or web address] or call [phone] to talk to our [facility] staff. Remember, [facility] is NOT to be used for urgent needs. For medical emergencies, dial 911.          [facility] complies with applicable Federal civil rights laws and does not discriminate on the basis of race, color, national origin, age, disability, or sex.        Language Assistance Services     ATTENTION: Language assistance services are available, free of charge. Please call [phone].      ATENCIÓN: Si mercedes marcus, tiene a pearson disposición servicios gratuitos  de asistencia lingüística. Ramon sam [phone].     CAROLINA Ý: N?u b?n nói Ti?ng Vi?t, có các d?ch v? h? tr? ngôn ng? mi?n phí philh cho b?n. G?i s? [phone].

## 2017-05-09 NOTE — DISCHARGE INSTRUCTIONS
Abdominal Pain    Abdominal pain is pain in the stomach or belly area. Everyone has this pain from time to time. In many cases it goes away on its own. But abdominal pain can sometimes be due to a serious problem, such as appendicitis. So its important to know when to seek help.  Causes of abdominal pain  There are many possible causes of abdominal pain. Common causes in adults include:  · Constipation, diarrhea, or gas  · Stomach acid flowing back up into the esophagus (acid reflux or heartburn)  · Severe acid reflux, called GERD (gastroesophageal reflux disease)  · A sore in the lining of the stomach or small intestine (peptic ulcer)  · Inflammation of the gallbladder, liver, or pancreas  · Gallstones or kidney stones  · Appendicitis   · Intestinal blockage   · An internal organ pushing through a muscle or other tissue (hernia)  · Urinary tract infections  · In women, menstrual cramps, fibroids, or endometriosis  · Inflammation or infection of the intestines  Diagnosing the cause of abdominal pain  Your healthcare provider will do a physical exam help find the cause of your pain. If needed, tests will be ordered. Belly pain has many possible causes. So it can be hard to find the reason for your pain. Giving details about your pain can help. Tell your provider where and when you feel the pain, and what makes it better or worse. Also let your provider know if you have other symptoms such as:  · Fever  · Tiredness  · Upset stomach (nausea)  · Vomiting  · Changes in bathroom habits  Treating abdominal pain  Some causes of pain need emergency medical treatment right away. These include appendicitis or a bowel blockage. Other problems can be treated with rest, fluids, or medicines. Your healthcare provider can give you specific instructions for treatment or self-care based on what is causing your pain.  If you have vomiting or diarrhea, sip water or other clear fluids. When you are ready to eat solid foods again,  start with small amounts of easy-to-digest, low-fat foods. These include apple sauce, toast, or crackers.   When to seek medical care  Call 911 or go to the hospital right away if you:  · Cant pass stool and are vomiting  · Are vomiting blood or have bloody diarrhea or black, tarry diarrhea  · Have chest, neck, or shoulder pain  · Feel like you might pass out  · Have pain in your shoulder blades with nausea  · Have sudden, severe belly pain  · Have new, severe pain unlike any you have felt before  · Have a belly that is rigid, hard, and tender to touch  Call your healthcare provider if you have:  · Pain for more than 5 days  · Bloating for more than 2 days  · Diarrhea for more than 5 days  · A fever of 100.4°F (38.0°C) or higher, or as directed by your provider  · Pain that gets worse  · Weight loss for no reason  · Continued lack of appetite  · Blood in your stool  How to prevent abdominal pain  Here are some tips to help prevent abdominal pain:  · Eat smaller amounts of food at one time.  · Avoid greasy, fried, or other high-fat foods.  · Avoid foods that give you gas.  · Exercise regularly.  · Drink plenty of fluids.  To help prevent GERD symptoms:  · Quit smoking.  · Reduce alcohol and certain foods that increase stomach acid.  · Avoid aspirin and over-the-counter pain and fever medicines (NSAIDS or nonsteroidal anti-inflammatory drugs), if possible  · Lose extra weight.  · Finish eating at least 2 hours before you go to bed or lie down.  · Raise the head of your bed.  Date Last Reviewed: 7/1/2016  © 6563-5985 [employer]. [address] [place] [address], [place] [address]. All rights reserved. This information is not intended as a substitute for professional medical care. Always follow your healthcare professional's instructions.

## 2017-05-09 NOTE — ED PROVIDER NOTES
SCRIBE #1 NOTE: I, Ankur Daugherty, am scribing for, and in the presence of, Piyush Rendon NP. I have scribed the entire note.      History      Chief Complaint   Patient presents with    Emesis     that started yesterday and abd pain       Review of patient's allergies indicates:  No Known Allergies     HPI   HPI    5/8/2017, 9:01 PM   History obtained from the patient      History of Present Illness: Marcie Bazan is a 51 y.o. female patient who presents to the Emergency Department for an evaluation of emesis which onset suddenly yesterday. Symptoms are intermittent and moderate in severity. Exacerbated by nothing and relieved by nothing. Associated sxs include nausea and upper abdominal pain. Patient denies any fever, chils, appetite change, diarrhea, constipation, HA, weakness, dysuria, hematuria, and all other sxs at this time. [facility] and given [facility]fran with some relief, but her sx have returned. No further complaints or concerns at this time.     Arrival mode: Personal vehicle    PCP: ODILIA Wall       Past Medical History:  Past Medical History:   Diagnosis Date    Hypertension        Past Surgical History:  Past Surgical History:   Procedure Laterality Date    BREAST SURGERY      CHOLECYSTECTOMY      HYSTERECTOMY           Family History:  Family History   Problem Relation Age of Onset    Hypertension Mother     Hypertension Father     Diabetes Father     Diabetes Sister     Depression Maternal Grandfather        Social History:  Social History     Social History Main Topics    Smoking status: Never Smoker    Smokeless tobacco: Unknown    Alcohol use Yes      Comment: occasionally    Drug use: No    Sexual activity: Unknown       ROS   Review of Systems   Constitutional: Negative for appetite change, chills, diaphoresis and fever.   HENT: Negative for sore throat.    Respiratory: Negative for shortness of breath.    Cardiovascular:  Negative for chest pain.   Gastrointestinal: Positive for abdominal pain, nausea and vomiting. Negative for abdominal distention, constipation and diarrhea.   Genitourinary: Negative for decreased urine volume, difficulty urinating, dysuria and hematuria.   Musculoskeletal: Negative for back pain, neck pain and neck stiffness.   Skin: Negative for rash.   Neurological: Negative for dizziness, weakness, light-headedness and headaches.   Hematological: Does not bruise/bleed easily.       Physical Exam    Initial Vitals   BP Pulse Resp Temp SpO2   05/08/17 2025 05/08/17 2025 05/08/17 2025 05/08/17 2025 05/08/17 2025   159/81 100 20 98.7 °F (37.1 °C) 99 %      Physical Exam  Nursing Notes and Vital Signs Reviewed.  Constitutional: Patient is in no acute distress. Awake and alert. Well-developed and well-nourished.  Head: Atraumatic. Normocephalic.  Eyes: PERRL. EOM intact. Conjunctivae are not pale. No scleral icterus.  ENT: Mucous membranes are moist. Oropharynx is clear and symmetric.    Neck: Supple. Full ROM. No lymphadenopathy.  Cardiovascular: Regular rate. Regular rhythm. No murmurs, rubs, or gallops. Distal pulses are 2+ and symmetric.  Pulmonary/Chest: No respiratory distress. Clear to auscultation bilaterally. No wheezing, rales, or rhonchi.  Abdominal: Soft and non-distended.  Diffuse  abdominal tenderness.  No rebound, guarding, or rigidity. Good bowel sounds.  Genitourinary: No CVA tenderness  Musculoskeletal: Moves all extremities. No obvious deformities. No edema. No calf tenderness.  Skin: Warm and dry.  Neurological:  Alert, awake, and appropriate.  Normal speech.  No acute focal neurological deficits are appreciated.  Psychiatric: Normal affect. Good eye contact. Appropriate in content.    ED Course    Procedures  ED Vital Signs:  Vitals:    05/08/17 2025 05/08/17 2347   BP: (!) 159/81 (!) 145/84   Pulse: 100 74   Resp: 20    Temp: 98.7 °F (37.1 °C)    TempSrc: Oral    SpO2: 99% 97%   Weight: 79.8 kg  "(176 lb)    Height: 5' 5" (1.651 m)        Abnormal Lab Results:  Labs Reviewed   CBC W/ AUTO DIFFERENTIAL - Abnormal; Notable for the following:        Result Value    Hemoglobin 8.1 (*)     Hematocrit 26.8 (*)     MCV 65 (*)     MCH 19.6 (*)     MCHC 30.2 (*)     RDW 20.0 (*)     Platelets 76 (*)     Gran% 73.3 (*)     Platelet Estimate Decreased (*)     All other components within normal limits   COMPREHENSIVE METABOLIC PANEL - Abnormal; Notable for the following:     Potassium 3.3 (*)     Calcium 8.5 (*)     Albumin 3.0 (*)     Total Bilirubin 7.6 (*)     Alkaline Phosphatase 141 (*)     AST 70 (*)     All other components within normal limits   AMYLASE   LIPASE        All Lab Results:  Results for orders placed or performed during the hospital encounter of 05/08/17   CBC auto differential   Result Value Ref Range    WBC 9.60 3.90 - 12.70 K/uL    RBC 4.14 4.00 - 5.40 M/uL    Hemoglobin 8.1 (L) 12.0 - 16.0 g/dL    Hematocrit 26.8 (L) 37.0 - 48.5 %    MCV 65 (L) 82 - 98 fL    MCH 19.6 (L) 27.0 - 31.0 pg    MCHC 30.2 (L) 32.0 - 36.0 %    RDW 20.0 (H) 11.5 - 14.5 %    Platelets 76 (L) 150 - 350 K/uL    MPV SEE COMMENT 9.2 - 12.9 fL    Gran # 7.0 1.8 - 7.7 K/uL    Lymph # 1.8 1.0 - 4.8 K/uL    Mono # 0.7 0.3 - 1.0 K/uL    Eos # 0.0 0.0 - 0.5 K/uL    Baso # 0.03 0.00 - 0.20 K/uL    Gran% 73.3 (H) 38.0 - 73.0 %    Lymph% 19.1 18.0 - 48.0 %    Mono% 7.2 4.0 - 15.0 %    Eosinophil% 0.3 0.0 - 8.0 %    Basophil% 0.3 0.0 - 1.9 %    Platelet Estimate Decreased (A)     Aniso Slight     Poik Moderate     Poly Occasional     Hypo Moderate     Target Cells Moderate     Differential Method Automated    Comprehensive metabolic panel   Result Value Ref Range    Sodium 137 136 - 145 mmol/L    Potassium 3.3 (L) 3.5 - 5.1 mmol/L    Chloride 100 95 - 110 mmol/L    CO2 26 23 - 29 mmol/L    Glucose 109 70 - 110 mg/dL    BUN, Bld 7 6 - 20 mg/dL    Creatinine 0.7 0.5 - 1.4 mg/dL    Calcium 8.5 (L) 8.7 - 10.5 mg/dL    Total Protein 8.4 6.0 " - 8.4 g/dL    Albumin 3.0 (L) 3.5 - 5.2 g/dL    Total Bilirubin 7.6 (H) 0.1 - 1.0 mg/dL    Alkaline Phosphatase 141 (H) 55 - 135 U/L    AST 70 (H) 10 - 40 U/L    ALT 22 10 - 44 U/L    Anion Gap 11 8 - 16 mmol/L    eGFR if African American >60 >60 mL/min/1.73 m^2    eGFR if non African American >60 >60 mL/min/1.73 m^2   Amylase   Result Value Ref Range    Amylase 35 20 - 110 U/L   Lipase   Result Value Ref Range    Lipase 5 4 - 60 U/L         Imaging Results:  Imaging Results         X-Ray Abdomen Flat And Erect (Final result) Result time:  05/08/17 22:19:23    Final result by Romeo Wanye MD (05/08/17 22:19:23)    Impression:             Remote cholecystectomy.  No acute abdominal abnormality identified.        Electronically signed by: ROMEO WAYNE MD  Date:     05/08/17  Time:    22:19     Narrative:    Exam: Abdomen, 2 views.    Clinical History:   R10.9 Unspecified abdominal pain .    Findings:    The lung bases are clear.  Nonspecific abdominal gas pattern.  Clips in right upper quadrant from prior gallbladder surgery..   No abnormal air-fluid levels or free intraperitoneal air identified.                      The Emergency Provider reviewed the vital signs and test results, which are outlined above.    ED Discussion     11:01 PM: Reassessed pt at this time. Pt is awake, alert, and in NAD.  Discussed with pt all pertinent ED information and results. Discussed pt dx and plan of tx. Gave pt all f/u and return to the ED instructions. All questions and concerns were addressed at this time. Pt expresses understanding of information and instructions, and is comfortable with plan to discharge. Pt is stable for discharge.    I discussed with patient and/or family/caretaker that evaluation in the ED does not suggest any emergent or life threatening medical conditions requiring immediate intervention beyond what was provided in the ED, and I believe patient is safe for discharge.  Regardless, an unremarkable evaluation  in the ED does not preclude the development or presence of a serious of life threatening condition. As such, patient was instructed to return immediately for any worsening or change in current symptoms.      ED Medication(s):  Medications   sodium chloride 0.9% bolus 1,000 mL (0 mLs Intravenous Stopped 5/8/17 4216)   ondansetron injection 4 mg (4 mg Intravenous Given 5/8/17 6803)       Discharge Medication List as of 5/8/2017 11:03 PM      START taking these medications    Details   dicyclomine (BENTYL) 20 mg tablet Take 1 tablet (20 mg total) by mouth 2 (two) times daily., Starting 5/8/2017, Until Wed 6/7/17, Print      ondansetron (ZOFRAN) 4 MG tablet Take 1 tablet (4 mg total) by mouth every 12 (twelve) hours as needed., Starting 5/8/2017, Until Discontinued, Print      promethazine (PHENERGAN) 25 MG tablet Take 1 tablet (25 mg total) by mouth every 6 (six) hours as needed for Nausea., Starting 5/8/2017, Until Discontinued, Print             Follow-up Information     Follow up with ODILIA Wall In 2 days.    Specialty:  Family Medicine    Why:  As needed, If symptoms worsen return to ED     Contact information:    [address] [facility] [address] [phone]              Medical Decision Making    Medical Decision Making:   Clinical Tests:   Lab Tests: Ordered and Reviewed  Radiological Study: Ordered and Reviewed           Scribe Attestation:   Scribe #1: I performed the above scribed service and the documentation accurately describes the services I performed. I attest to the accuracy of the note.    Attending:   Physician Attestation Statement for Scribe #1: I, Piyush Rendon NP, personally performed the services described in this documentation, as scribed by Ankur Daugherty, in my presence, and it is both accurate and complete.          Clinical Impression       ICD-10-CM ICD-9-CM   1. Gastritis without bleeding, unspecified chronicity, unspecified gastritis type K29.70 535.50    2. Abdominal pain R10.9 789.00   3. Anemia of chronic disorder D63.8 285.29       Disposition:   Disposition: Discharged  Condition: Stable         DEMETRIUS Andrea  05/09/17 1918

## 2017-09-11 ENCOUNTER — HOSPITAL ENCOUNTER (INPATIENT)
Facility: HOSPITAL | Age: 52
LOS: 3 days | Discharge: HOME | DRG: 442 | End: 2017-09-15
Attending: EMERGENCY MEDICINE | Admitting: INTERNAL MEDICINE
Payer: MEDICAID

## 2017-09-11 DIAGNOSIS — D64.9 ANEMIA, UNSPECIFIED TYPE: ICD-10-CM

## 2017-09-11 DIAGNOSIS — R17 JAUNDICE: Primary | ICD-10-CM

## 2017-09-11 DIAGNOSIS — D68.9 COAGULOPATHY: ICD-10-CM

## 2017-09-11 DIAGNOSIS — E83.39 HYPOPHOSPHATEMIA: ICD-10-CM

## 2017-09-11 DIAGNOSIS — E87.6 HYPOKALEMIA: ICD-10-CM

## 2017-09-11 DIAGNOSIS — R07.9 CHEST PAIN: ICD-10-CM

## 2017-09-11 DIAGNOSIS — D69.6 THROMBOCYTOPENIA, UNSPECIFIED: ICD-10-CM

## 2017-09-11 DIAGNOSIS — K74.60 LIVER CIRRHOSIS SECONDARY TO NASH: ICD-10-CM

## 2017-09-11 DIAGNOSIS — R93.3 ABNORMAL CT SCAN, SMALL BOWEL: ICD-10-CM

## 2017-09-11 DIAGNOSIS — K75.81 LIVER CIRRHOSIS SECONDARY TO NASH: ICD-10-CM

## 2017-09-11 PROBLEM — I10 ESSENTIAL HYPERTENSION: Status: ACTIVE | Noted: 2017-09-11

## 2017-09-11 PROBLEM — E83.42 HYPOMAGNESEMIA: Status: ACTIVE | Noted: 2017-09-11

## 2017-09-11 PROBLEM — R29.898 WEAKNESS OF DISTAL ARMS AND LEGS: Status: ACTIVE | Noted: 2017-09-11

## 2017-09-11 PROBLEM — R04.2 HEMOPTYSIS: Status: ACTIVE | Noted: 2017-02-09

## 2017-09-11 PROBLEM — R14.0 ABDOMINAL DISTENSION: Status: ACTIVE | Noted: 2017-09-11

## 2017-09-11 LAB
ABO + RH BLD: NORMAL
ALBUMIN SERPL BCP-MCNC: 2.4 G/DL
ALP SERPL-CCNC: 154 U/L
ALT SERPL W/O P-5'-P-CCNC: 21 U/L
ANION GAP SERPL CALC-SCNC: 15 MMOL/L
ANISOCYTOSIS BLD QL SMEAR: SLIGHT
APTT BLDCRRT: 32.4 SEC
AST SERPL-CCNC: 77 U/L
BASOPHILS # BLD AUTO: 0.03 K/UL
BASOPHILS NFR BLD: 0.3 %
BILIRUB SERPL-MCNC: 11.9 MG/DL
BLD GP AB SCN CELLS X3 SERPL QL: NORMAL
BNP SERPL-MCNC: 19 PG/ML
BUN SERPL-MCNC: 5 MG/DL
CALCIUM SERPL-MCNC: 7.8 MG/DL
CHLORIDE SERPL-SCNC: 95 MMOL/L
CO2 SERPL-SCNC: 24 MMOL/L
CREAT SERPL-MCNC: 0.8 MG/DL
DIFFERENTIAL METHOD: ABNORMAL
EOSINOPHIL # BLD AUTO: 0 K/UL
EOSINOPHIL NFR BLD: 0 %
ERYTHROCYTE [DISTWIDTH] IN BLOOD BY AUTOMATED COUNT: 20.8 %
EST. GFR  (AFRICAN AMERICAN): >60 ML/MIN/1.73 M^2
EST. GFR  (NON AFRICAN AMERICAN): >60 ML/MIN/1.73 M^2
GLUCOSE SERPL-MCNC: 94 MG/DL
HCT VFR BLD AUTO: 27.4 %
HGB BLD-MCNC: 8.7 G/DL
HYPOCHROMIA BLD QL SMEAR: ABNORMAL
INR PPP: 2.1
LIPASE SERPL-CCNC: 6 U/L
LYMPHOCYTES # BLD AUTO: 1.6 K/UL
LYMPHOCYTES NFR BLD: 13.8 %
MAGNESIUM SERPL-MCNC: 1.3 MG/DL
MCH RBC QN AUTO: 21.5 PG
MCHC RBC AUTO-ENTMCNC: 31.8 G/DL
MCV RBC AUTO: 68 FL
MONOCYTES # BLD AUTO: 1.3 K/UL
MONOCYTES NFR BLD: 11.6 %
NEUTROPHILS # BLD AUTO: 8.6 K/UL
NEUTROPHILS NFR BLD: 74.6 %
PHOSPHATE SERPL-MCNC: 1.8 MG/DL
PLATELET # BLD AUTO: 146 K/UL
PLATELET BLD QL SMEAR: ABNORMAL
PMV BLD AUTO: ABNORMAL FL
POIKILOCYTOSIS BLD QL SMEAR: SLIGHT
POLYCHROMASIA BLD QL SMEAR: ABNORMAL
POTASSIUM SERPL-SCNC: 2.4 MMOL/L
PROT SERPL-MCNC: 7.4 G/DL
PROTHROMBIN TIME: 21.4 SEC
RBC # BLD AUTO: 4.04 M/UL
SODIUM SERPL-SCNC: 134 MMOL/L
STOMATOCYTES BLD QL SMEAR: PRESENT
TARGETS BLD QL SMEAR: ABNORMAL
TROPONIN I SERPL DL<=0.01 NG/ML-MCNC: 0.01 NG/ML
WBC # BLD AUTO: 11.55 K/UL

## 2017-09-11 PROCEDURE — 93005 ELECTROCARDIOGRAM TRACING: CPT

## 2017-09-11 PROCEDURE — 86900 BLOOD TYPING SEROLOGIC ABO: CPT

## 2017-09-11 PROCEDURE — 84484 ASSAY OF TROPONIN QUANT: CPT

## 2017-09-11 PROCEDURE — 96374 THER/PROPH/DIAG INJ IV PUSH: CPT

## 2017-09-11 PROCEDURE — 85025 COMPLETE CBC W/AUTO DIFF WBC: CPT

## 2017-09-11 PROCEDURE — 83735 ASSAY OF MAGNESIUM: CPT

## 2017-09-11 PROCEDURE — 63600175 PHARM REV CODE 636 W HCPCS: Performed by: EMERGENCY MEDICINE

## 2017-09-11 PROCEDURE — 36415 COLL VENOUS BLD VENIPUNCTURE: CPT

## 2017-09-11 PROCEDURE — 21400001 HC TELEMETRY ROOM

## 2017-09-11 PROCEDURE — 25000003 PHARM REV CODE 250: Performed by: EMERGENCY MEDICINE

## 2017-09-11 PROCEDURE — 93010 ELECTROCARDIOGRAM REPORT: CPT | Mod: ,,, | Performed by: INTERNAL MEDICINE

## 2017-09-11 PROCEDURE — 83690 ASSAY OF LIPASE: CPT

## 2017-09-11 PROCEDURE — 86901 BLOOD TYPING SEROLOGIC RH(D): CPT

## 2017-09-11 PROCEDURE — 85610 PROTHROMBIN TIME: CPT

## 2017-09-11 PROCEDURE — 99285 EMERGENCY DEPT VISIT HI MDM: CPT | Mod: 25

## 2017-09-11 PROCEDURE — 85730 THROMBOPLASTIN TIME PARTIAL: CPT

## 2017-09-11 PROCEDURE — 25500020 PHARM REV CODE 255: Performed by: EMERGENCY MEDICINE

## 2017-09-11 PROCEDURE — 83880 ASSAY OF NATRIURETIC PEPTIDE: CPT

## 2017-09-11 PROCEDURE — G0378 HOSPITAL OBSERVATION PER HR: HCPCS

## 2017-09-11 PROCEDURE — 80053 COMPREHEN METABOLIC PANEL: CPT

## 2017-09-11 PROCEDURE — 84100 ASSAY OF PHOSPHORUS: CPT

## 2017-09-11 PROCEDURE — 96361 HYDRATE IV INFUSION ADD-ON: CPT

## 2017-09-11 RX ORDER — NITROGLYCERIN 0.4 MG/1
0.4 TABLET SUBLINGUAL EVERY 5 MIN PRN
Status: ON HOLD | COMMUNITY
Start: 2017-09-11 | End: 2017-10-20

## 2017-09-11 RX ORDER — POTASSIUM CHLORIDE 20 MEQ/1
60 TABLET, EXTENDED RELEASE ORAL
Status: COMPLETED | OUTPATIENT
Start: 2017-09-11 | End: 2017-09-11

## 2017-09-11 RX ORDER — OMEPRAZOLE 40 MG/1
40 CAPSULE, DELAYED RELEASE ORAL DAILY
Status: ON HOLD | COMMUNITY
Start: 2017-03-08 | End: 2018-01-04 | Stop reason: ALTCHOICE

## 2017-09-11 RX ORDER — SODIUM CHLORIDE AND POTASSIUM CHLORIDE 150; 900 MG/100ML; MG/100ML
1000 INJECTION, SOLUTION INTRAVENOUS
Status: COMPLETED | OUTPATIENT
Start: 2017-09-11 | End: 2017-09-11

## 2017-09-11 RX ORDER — MAGNESIUM SULFATE 1 G/100ML
1 INJECTION INTRAVENOUS
Status: COMPLETED | OUTPATIENT
Start: 2017-09-12 | End: 2017-09-12

## 2017-09-11 RX ORDER — ASPIRIN 325 MG
325 TABLET ORAL DAILY
COMMUNITY
Start: 2017-09-11 | End: 2017-09-18

## 2017-09-11 RX ORDER — LISINOPRIL 5 MG/1
TABLET ORAL
Status: ON HOLD | COMMUNITY
Start: 2017-06-08 | End: 2018-01-04 | Stop reason: ALTCHOICE

## 2017-09-11 RX ORDER — ONDANSETRON 2 MG/ML
4 INJECTION INTRAMUSCULAR; INTRAVENOUS
Status: COMPLETED | OUTPATIENT
Start: 2017-09-11 | End: 2017-09-11

## 2017-09-11 RX ORDER — CETIRIZINE HYDROCHLORIDE 10 MG/1
10 TABLET ORAL DAILY
Status: ON HOLD | COMMUNITY
Start: 2017-02-09 | End: 2018-01-04 | Stop reason: ALTCHOICE

## 2017-09-11 RX ADMIN — ONDANSETRON 4 MG: 2 INJECTION INTRAMUSCULAR; INTRAVENOUS at 09:09

## 2017-09-11 RX ADMIN — IOHEXOL 75 ML: 350 INJECTION, SOLUTION INTRAVENOUS at 10:09

## 2017-09-11 RX ADMIN — POTASSIUM CHLORIDE 60 MEQ: 1500 TABLET, EXTENDED RELEASE ORAL at 10:09

## 2017-09-11 RX ADMIN — SODIUM CHLORIDE AND POTASSIUM CHLORIDE 1000 ML: 9; 1.49 INJECTION, SOLUTION INTRAVENOUS at 10:09

## 2017-09-12 PROBLEM — R18.8 ASCITES: Status: ACTIVE | Noted: 2017-09-11

## 2017-09-12 PROBLEM — R93.3 ABNORMAL CT SCAN, SMALL BOWEL: Status: ACTIVE | Noted: 2017-09-12

## 2017-09-12 LAB
ALBUMIN SERPL BCP-MCNC: 2.1 G/DL
ALP SERPL-CCNC: 137 U/L
ALT SERPL W/O P-5'-P-CCNC: 20 U/L
ANION GAP SERPL CALC-SCNC: 9 MMOL/L
ANISOCYTOSIS BLD QL SMEAR: ABNORMAL
AST SERPL-CCNC: 67 U/L
BASOPHILS # BLD AUTO: 0.03 K/UL
BASOPHILS NFR BLD: 0.3 %
BILIRUB SERPL-MCNC: 11.4 MG/DL
BILIRUB UR QL STRIP: ABNORMAL
BLD PROD TYP BPU: NORMAL
BLOOD UNIT EXPIRATION DATE: NORMAL
BLOOD UNIT TYPE CODE: 600
BLOOD UNIT TYPE: NORMAL
BUN SERPL-MCNC: 4 MG/DL
CALCIUM SERPL-MCNC: 7.7 MG/DL
CHLORIDE SERPL-SCNC: 98 MMOL/L
CHOLEST SERPL-MCNC: 106 MG/DL
CHOLEST/HDLC SERPL: 17.7 {RATIO}
CLARITY UR: CLEAR
CO2 SERPL-SCNC: 29 MMOL/L
CODING SYSTEM: NORMAL
COLOR UR: YELLOW
CREAT SERPL-MCNC: 0.7 MG/DL
DIFFERENTIAL METHOD: ABNORMAL
DISPENSE STATUS: NORMAL
EOSINOPHIL # BLD AUTO: 0.1 K/UL
EOSINOPHIL NFR BLD: 0.7 %
ERYTHROCYTE [DISTWIDTH] IN BLOOD BY AUTOMATED COUNT: 21 %
EST. GFR  (AFRICAN AMERICAN): >60 ML/MIN/1.73 M^2
EST. GFR  (NON AFRICAN AMERICAN): >60 ML/MIN/1.73 M^2
GLUCOSE SERPL-MCNC: 119 MG/DL
GLUCOSE UR QL STRIP: NEGATIVE
HCT VFR BLD AUTO: 25.5 %
HDLC SERPL-MCNC: 6 MG/DL
HDLC SERPL: 5.7 %
HGB BLD-MCNC: 8.2 G/DL
HGB UR QL STRIP: ABNORMAL
HYPOCHROMIA BLD QL SMEAR: ABNORMAL
KETONES UR QL STRIP: NEGATIVE
LDLC SERPL CALC-MCNC: 77.2 MG/DL
LEUKOCYTE ESTERASE UR QL STRIP: NEGATIVE
LYMPHOCYTES # BLD AUTO: 2.3 K/UL
LYMPHOCYTES NFR BLD: 24.1 %
MAGNESIUM SERPL-MCNC: 2.3 MG/DL
MCH RBC QN AUTO: 21.7 PG
MCHC RBC AUTO-ENTMCNC: 32.2 G/DL
MCV RBC AUTO: 68 FL
MONOCYTES # BLD AUTO: 1.1 K/UL
MONOCYTES NFR BLD: 11.6 %
NEUTROPHILS # BLD AUTO: 6.2 K/UL
NEUTROPHILS NFR BLD: 63.6 %
NITRITE UR QL STRIP: NEGATIVE
NONHDLC SERPL-MCNC: 100 MG/DL
NUM UNITS TRANS FFP: NORMAL
PH UR STRIP: 6 [PH] (ref 5–8)
PHOSPHATE SERPL-MCNC: 2.8 MG/DL
PLATELET # BLD AUTO: 126 K/UL
PMV BLD AUTO: ABNORMAL FL
POIKILOCYTOSIS BLD QL SMEAR: ABNORMAL
POTASSIUM SERPL-SCNC: 2.8 MMOL/L
POTASSIUM SERPL-SCNC: 2.8 MMOL/L
PROT SERPL-MCNC: 6.7 G/DL
PROT UR QL STRIP: NEGATIVE
RBC # BLD AUTO: 3.78 M/UL
SODIUM SERPL-SCNC: 136 MMOL/L
SP GR UR STRIP: 1.02 (ref 1–1.03)
T4 FREE SERPL-MCNC: 0.84 NG/DL
TARGETS BLD QL SMEAR: ABNORMAL
TRIGL SERPL-MCNC: 114 MG/DL
TROPONIN I SERPL DL<=0.01 NG/ML-MCNC: 0.01 NG/ML
TROPONIN I SERPL DL<=0.01 NG/ML-MCNC: 0.01 NG/ML
TROPONIN I SERPL DL<=0.01 NG/ML-MCNC: 0.02 NG/ML
TSH SERPL DL<=0.005 MIU/L-ACNC: 0.93 UIU/ML
URN SPEC COLLECT METH UR: ABNORMAL
UROBILINOGEN UR STRIP-ACNC: ABNORMAL EU/DL
WBC # BLD AUTO: 9.72 K/UL

## 2017-09-12 PROCEDURE — 81003 URINALYSIS AUTO W/O SCOPE: CPT

## 2017-09-12 PROCEDURE — 36415 COLL VENOUS BLD VENIPUNCTURE: CPT

## 2017-09-12 PROCEDURE — 99233 SBSQ HOSP IP/OBS HIGH 50: CPT | Mod: ,,, | Performed by: INTERNAL MEDICINE

## 2017-09-12 PROCEDURE — 63600175 PHARM REV CODE 636 W HCPCS: Performed by: NURSE PRACTITIONER

## 2017-09-12 PROCEDURE — 86985 SPLIT BLOOD OR PRODUCTS: CPT

## 2017-09-12 PROCEDURE — 25000003 PHARM REV CODE 250: Performed by: NURSE PRACTITIONER

## 2017-09-12 PROCEDURE — 21400001 HC TELEMETRY ROOM

## 2017-09-12 PROCEDURE — 85025 COMPLETE CBC W/AUTO DIFF WBC: CPT

## 2017-09-12 PROCEDURE — P9011 BLOOD SPLIT UNIT: HCPCS

## 2017-09-12 PROCEDURE — 84484 ASSAY OF TROPONIN QUANT: CPT | Mod: 91

## 2017-09-12 PROCEDURE — 87040 BLOOD CULTURE FOR BACTERIA: CPT

## 2017-09-12 PROCEDURE — 36430 TRANSFUSION BLD/BLD COMPNT: CPT

## 2017-09-12 PROCEDURE — 84443 ASSAY THYROID STIM HORMONE: CPT

## 2017-09-12 PROCEDURE — 80061 LIPID PANEL: CPT

## 2017-09-12 PROCEDURE — 80053 COMPREHEN METABOLIC PANEL: CPT

## 2017-09-12 PROCEDURE — 84100 ASSAY OF PHOSPHORUS: CPT

## 2017-09-12 PROCEDURE — 25000003 PHARM REV CODE 250: Performed by: FAMILY MEDICINE

## 2017-09-12 PROCEDURE — 83735 ASSAY OF MAGNESIUM: CPT

## 2017-09-12 PROCEDURE — 84439 ASSAY OF FREE THYROXINE: CPT

## 2017-09-12 PROCEDURE — 84132 ASSAY OF SERUM POTASSIUM: CPT

## 2017-09-12 RX ORDER — NITROGLYCERIN 0.4 MG/1
0.4 TABLET SUBLINGUAL EVERY 5 MIN PRN
Status: DISCONTINUED | OUTPATIENT
Start: 2017-09-12 | End: 2017-09-15 | Stop reason: HOSPADM

## 2017-09-12 RX ORDER — RAMELTEON 8 MG/1
8 TABLET ORAL NIGHTLY PRN
Status: DISCONTINUED | OUTPATIENT
Start: 2017-09-12 | End: 2017-09-15 | Stop reason: HOSPADM

## 2017-09-12 RX ORDER — ONDANSETRON 2 MG/ML
4 INJECTION INTRAMUSCULAR; INTRAVENOUS EVERY 6 HOURS PRN
Status: DISCONTINUED | OUTPATIENT
Start: 2017-09-12 | End: 2017-09-15 | Stop reason: HOSPADM

## 2017-09-12 RX ORDER — HYDROCODONE BITARTRATE AND ACETAMINOPHEN 500; 5 MG/1; MG/1
TABLET ORAL
Status: DISCONTINUED | OUTPATIENT
Start: 2017-09-12 | End: 2017-09-13 | Stop reason: SDUPTHER

## 2017-09-12 RX ORDER — PHYTONADIONE 5 MG/1
5 TABLET ORAL ONCE
Status: COMPLETED | OUTPATIENT
Start: 2017-09-12 | End: 2017-09-12

## 2017-09-12 RX ORDER — ASPIRIN 325 MG
325 TABLET ORAL DAILY
Status: DISCONTINUED | OUTPATIENT
Start: 2017-09-12 | End: 2017-09-12

## 2017-09-12 RX ORDER — LISINOPRIL 5 MG/1
5 TABLET ORAL DAILY
Status: DISCONTINUED | OUTPATIENT
Start: 2017-09-12 | End: 2017-09-15 | Stop reason: HOSPADM

## 2017-09-12 RX ORDER — PANTOPRAZOLE SODIUM 40 MG/1
40 TABLET, DELAYED RELEASE ORAL DAILY
Status: DISCONTINUED | OUTPATIENT
Start: 2017-09-12 | End: 2017-09-15 | Stop reason: HOSPADM

## 2017-09-12 RX ADMIN — PANTOPRAZOLE SODIUM 40 MG: 40 TABLET, DELAYED RELEASE ORAL at 08:09

## 2017-09-12 RX ADMIN — MAGNESIUM SULFATE IN DEXTROSE 1 G: 10 INJECTION, SOLUTION INTRAVENOUS at 02:09

## 2017-09-12 RX ADMIN — ASPIRIN 325 MG ORAL TABLET 325 MG: 325 PILL ORAL at 08:09

## 2017-09-12 RX ADMIN — PHYTONADIONE 5 MG: 5 TABLET ORAL at 07:09

## 2017-09-12 RX ADMIN — ONDANSETRON 4 MG: 2 INJECTION INTRAMUSCULAR; INTRAVENOUS at 12:09

## 2017-09-12 RX ADMIN — LISINOPRIL 5 MG: 5 TABLET ORAL at 08:09

## 2017-09-12 RX ADMIN — MAGNESIUM SULFATE IN DEXTROSE 1 G: 10 INJECTION, SOLUTION INTRAVENOUS at 03:09

## 2017-09-12 RX ADMIN — POTASSIUM PHOSPHATE, MONOBASIC AND POTASSIUM PHOSPHATE, DIBASIC 30 MMOL: 224; 236 INJECTION, SOLUTION INTRAVENOUS at 03:09

## 2017-09-12 NOTE — ASSESSMENT & PLAN NOTE
CT scan suggested enteritis, but lacked oral contrast which limits the study. Also ascites can give a similar appearance. No reason to think she has enteritis at this time. If needed, could consider SBFT.

## 2017-09-12 NOTE — ASSESSMENT & PLAN NOTE
-Episodic with CP, resolved. Neuro exam negative on assessment.   -head CT negative  -hypokalemia, hypomagnesemia and hypophosphatemia noted. Corrected Calcium 9.1  -monitor     9/12/17: likely related to electrolyte abnormality.  Continue to replete as symptoms have improved

## 2017-09-12 NOTE — HPI
The patient presented to the ER for chest pain which relieved with Nitro. ACS was ruled out. We have been consulted for elevated bilirubin. 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 She reports swollen abdomen. She says this comes and goes. It usually lasts a few days, but this time it hasn't resolved. It is usually associated with nausea and vomiting. She denies a change in bowel habits, hematochezia or melena. A CT scan showed cirrhotic liver with hypodense nodules most likely representing regenerating nodules. She also had moderate ascites and mild nonspecific circumferential small bowel wall thickening. A review of Care Everywhere showed the liver nodules were known and not felt to be HCC. The ascites is new. She says she has never been drained before. Labs show INR up from 1.4 to 2.1. Total bilirubin increased to 11 without significant change in AST or ALT. She denies any change in medications. She denies taking OTC medications or supplements. No recent antibiotic use. UA doesn't look infected. CXR without acute disease. Afebrile with normal WBC count. The patient has chronic microcytic anemia. Currently stable.

## 2017-09-12 NOTE — ASSESSMENT & PLAN NOTE
-Episodic with CP, resolved. Neuro exam negative on assessment.   -head CT negative  -hypokalemia, hypomagnesemia and hypophosphatemia noted. Corrected Calcium 9.1  -monitor

## 2017-09-12 NOTE — PROGRESS NOTES
Received to room 241 from ER, report received from Meek LORA. Assumed care. aaox3 calm cooperative, assessment per flowsheet. BBS clear, resp deep, even, unlabored. Abd grossly distended, soft, non-tender at present time. BS + x4 quads, denies nausea and vomiting at present time. Denies and SOB, pain, or paresthesia. Safety and comfort measures maintained. POC discussed, Oriented to room and call system, hourly rounding also discussed, acknowledged, verbalized understanding. Will cont to monitor.

## 2017-09-12 NOTE — SUBJECTIVE & OBJECTIVE
Past Medical History:   Diagnosis Date    Hypertension     Liver cirrhosis        Past Surgical History:   Procedure Laterality Date    BREAST SURGERY      CHOLECYSTECTOMY      HYSTERECTOMY         Review of patient's allergies indicates:   Allergen Reactions    Ferrous sulfate      Patient states the pill makes her sick. She stated she would rather have a shot       No current facility-administered medications on file prior to encounter.      Current Outpatient Prescriptions on File Prior to Encounter   Medication Sig    gabapentin (NEURONTIN) 300 MG capsule Take 300 mg by mouth.    [DISCONTINUED] cyclobenzaprine (FLEXERIL) 10 MG tablet Take 10 mg by mouth.    [DISCONTINUED] omeprazole (PRILOSEC) 20 MG capsule Take 20 mg by mouth.    [DISCONTINUED] ondansetron (ZOFRAN) 4 MG tablet Take 1 tablet (4 mg total) by mouth every 12 (twelve) hours as needed.    [DISCONTINUED] promethazine (PHENERGAN) 25 MG tablet Take 1 tablet (25 mg total) by mouth every 6 (six) hours as needed for Nausea.     Family History     Problem Relation (Age of Onset)    Depression Maternal Grandfather    Diabetes Father, Sister    Hypertension Mother, Father        Social History Main Topics    Smoking status: Never Smoker    Smokeless tobacco: Never Used    Alcohol use Yes      Comment: occasionally    Drug use: No    Sexual activity: Not Currently     Review of Systems   Constitutional: Negative for chills, diaphoresis, fatigue and fever.   HENT: Negative for congestion, sore throat and voice change.    Eyes: Negative for photophobia and visual disturbance.   Respiratory: Negative for cough, shortness of breath, wheezing and stridor.    Cardiovascular: Positive for chest pain and palpitations. Negative for leg swelling.   Gastrointestinal: Positive for abdominal distention ( chronic and normal size per patient), nausea and vomiting. Negative for abdominal pain, constipation and diarrhea.   Endocrine: Negative for polydipsia,  polyphagia and polyuria.   Genitourinary: Negative for decreased urine volume, difficulty urinating, dysuria, flank pain, pelvic pain, urgency and vaginal discharge.   Musculoskeletal: Negative for back pain, joint swelling, neck pain and neck stiffness.   Skin: Negative for color change and rash.   Allergic/Immunologic: Negative for immunocompromised state.   Neurological: Positive for weakness ( left). Negative for dizziness, syncope, numbness and headaches.   Hematological: Does not bruise/bleed easily.   Psychiatric/Behavioral: Negative for agitation, behavioral problems and confusion.     Objective:     Vital Signs (Most Recent):  Temp: 100 °F (37.8 °C) (09/11/17 2007)  Pulse: 100 (09/11/17 2007)  Resp: 16 (09/11/17 2007)  BP: 131/71 (09/11/17 2007)  SpO2: 100 % (09/11/17 2007) Vital Signs (24h Range):  Temp:  [100 °F (37.8 °C)] 100 °F (37.8 °C)  Pulse:  [100] 100  Resp:  [16] 16  SpO2:  [100 %] 100 %  BP: (131)/(71) 131/71     Weight: 77.1 kg (170 lb)  Body mass index is 30.11 kg/m².    Physical Exam   Constitutional: She is oriented to person, place, and time. She appears well-developed and well-nourished. No distress.   HENT:   Head: Normocephalic and atraumatic.   Nose: Nose normal.   Eyes: EOM are normal. Pupils are equal, round, and reactive to light. No scleral icterus.   Jaundice     Neck: Normal range of motion. Neck supple. No tracheal deviation present.   Cardiovascular: Normal rate, regular rhythm, normal heart sounds and intact distal pulses.    No murmur heard.  Pulmonary/Chest: Effort normal and breath sounds normal. No stridor. No respiratory distress. She has no wheezes. She has no rales.   Abdominal: Soft. Bowel sounds are normal. She exhibits distension. There is tenderness ( mild epigastric). There is no guarding.   No ascites     Genitourinary:   Genitourinary Comments: Deferred     Musculoskeletal: Normal range of motion. She exhibits no edema or deformity.   Neurological: She is alert and  oriented to person, place, and time. No cranial nerve deficit.   Skin: Skin is warm and dry. Capillary refill takes 2 to 3 seconds. No rash noted. She is not diaphoretic.   juandice     Psychiatric: She has a normal mood and affect. Her behavior is normal. Judgment and thought content normal.   Nursing note and vitals reviewed.       Significant Labs: All pertinent labs within the past 24 hours have been reviewed.   Results for orders placed or performed during the hospital encounter of 09/11/17   CBC auto differential   Result Value Ref Range    WBC 11.55 3.90 - 12.70 K/uL    RBC 4.04 4.00 - 5.40 M/uL    Hemoglobin 8.7 (L) 12.0 - 16.0 g/dL    Hematocrit 27.4 (L) 37.0 - 48.5 %    MCV 68 (L) 82 - 98 fL    MCH 21.5 (L) 27.0 - 31.0 pg    MCHC 31.8 (L) 32.0 - 36.0 g/dL    RDW 20.8 (H) 11.5 - 14.5 %    Platelets 146 (L) 150 - 350 K/uL    MPV SEE COMMENT 9.2 - 12.9 fL    Gran # 8.6 (H) 1.8 - 7.7 K/uL    Lymph # 1.6 1.0 - 4.8 K/uL    Mono # 1.3 (H) 0.3 - 1.0 K/uL    Eos # 0.0 0.0 - 0.5 K/uL    Baso # 0.03 0.00 - 0.20 K/uL    Gran% 74.6 (H) 38.0 - 73.0 %    Lymph% 13.8 (L) 18.0 - 48.0 %    Mono% 11.6 4.0 - 15.0 %    Eosinophil% 0.0 0.0 - 8.0 %    Basophil% 0.3 0.0 - 1.9 %    Platelet Estimate Appears normal     Aniso Slight     Poik Slight     Poly Occasional     Hypo Moderate     Target Cells Moderate     Stomatocytes Present     Differential Method Automated    Comprehensive metabolic panel   Result Value Ref Range    Sodium 134 (L) 136 - 145 mmol/L    Potassium 2.4 (LL) 3.5 - 5.1 mmol/L    Chloride 95 95 - 110 mmol/L    CO2 24 23 - 29 mmol/L    Glucose 94 70 - 110 mg/dL    BUN, Bld 5 (L) 6 - 20 mg/dL    Creatinine 0.8 0.5 - 1.4 mg/dL    Calcium 7.8 (L) 8.7 - 10.5 mg/dL    Total Protein 7.4 6.0 - 8.4 g/dL    Albumin 2.4 (L) 3.5 - 5.2 g/dL    Total Bilirubin 11.9 (H) 0.1 - 1.0 mg/dL    Alkaline Phosphatase 154 (H) 55 - 135 U/L    AST 77 (H) 10 - 40 U/L    ALT 21 10 - 44 U/L    Anion Gap 15 8 - 16 mmol/L    eGFR if African  American >60 >60 mL/min/1.73 m^2    eGFR if non African American >60 >60 mL/min/1.73 m^2   Troponin I #1   Result Value Ref Range    Troponin I 0.006 0.000 - 0.026 ng/mL   B-Type natriuretic peptide (BNP)   Result Value Ref Range    BNP 19 0 - 99 pg/mL   Lipase   Result Value Ref Range    Lipase 6 4 - 60 U/L   Type & Screen   Result Value Ref Range    Group & Rh A NEG     Indirect Donato NEG          Significant Imaging: I have reviewed all pertinent imaging results/findings within the past 24 hours.   Imaging Results          X-Ray Chest AP Portable (Final result)  Result time 09/11/17 20:44:06    Final result by Pam Jean Jr., MD (09/11/17 20:44:06)                 Impression:     No acute cardiopulmonary disease.      Electronically signed by: PAM JEAN  Date:     09/11/17  Time:    20:44              Narrative:    Exam: Portable chest radiograph    History:    Chest pain    Findings:      The lungs are clear. The cardiac silhouette and mediastinum are within normal limits. The remaining osseous structures and soft tissues are within normal limits.

## 2017-09-12 NOTE — ASSESSMENT & PLAN NOTE
-obs with tele  -ASA  -trend troponin, first in ER neg  -EKG reviewed.  -Chest Xray reviewed, NAF  -Mg/Phos  -monitor  -consult cards if need

## 2017-09-12 NOTE — ASSESSMENT & PLAN NOTE
-Patient with hx of iron deficiency anemia receives iron infusions per patient.   -Stable  -monitor  -Patient to continue OP treatment with Briana Bishop MD, next appointment 9/15/17.

## 2017-09-12 NOTE — ASSESSMENT & PLAN NOTE
-OP follow up, consult GI if need.   -see abd distension    9/12/17: elevated bilirubin  GI consulted

## 2017-09-12 NOTE — H&P
[facility] Hospital Medicine  History & Physical    Patient Name: Marcie Bazan  MRN: [id number]  Admission Date: 9/11/2017  Attending Physician: Stevenson Allison MD  Primary Care Provider: ODILIA Wall         Patient information was obtained from patient, past medical records, Care Everywhere and ER records.     Subjective:     Principal Problem:Chest pain    Chief Complaint:   Chief Complaint   Patient presents with    Chest Pain     resolved after nitro. pain radiating to left arm with n/v        HPI: Marcie Bazan is a 51 y.o. female patient with PMHx  Including HTN and liver disease who presents to the Emergency Department from [facility] urgent care via EMS for chest pain which onset gradually earlier today. The patient was given Nitro in route with resolution to pain. The patient currently denies CP at this time. Her CP is intermittent and moderate in severity. Associated symptoms include reports nausea, emesis, palpitations and an episode of left side weakness. Pt reports abd distention that is chronic in nature and but appears worse today. PMHx liver disease, pt's currently under care of renal physician and hematologist. There are no mitigating or exacerbating factors noted.  Pt denies any fever, diarrhea, SOB, HA, dizziness, hemiparesis, cough, blood in stool, abd pain, constipation, and all other sxs at this time. No further complaints or concerns at this time. Last seen by Hepatology, Syd Bradshaw MD - 04/11/2017       MELD-Na score: 26 at 9/11/2017  8:47 PM  MELD score: 24 at 9/11/2017  8:47 PM  Calculated from:  Serum Creatinine: 0.8 mg/dL (Rounded to 1) at 9/11/2017  8:47 PM  Serum Sodium: 134 mmol/L at 9/11/2017  8:47 PM  Total Bilirubin: 11.9 mg/dL at 9/11/2017  8:47 PM  INR(ratio): 2.1 at 9/11/2017  8:45 PM  Age: 51 years      Past Medical History:   Diagnosis Date    Hypertension     Liver cirrhosis        Past Surgical History:   Procedure Laterality Date     BREAST SURGERY      CHOLECYSTECTOMY      HYSTERECTOMY         Review of patient's allergies indicates:   Allergen Reactions    Ferrous sulfate      Patient states the pill makes her sick. She stated she would rather have a shot       No current facility-administered medications on file prior to encounter.      Current Outpatient Prescriptions on File Prior to Encounter   Medication Sig    gabapentin (NEURONTIN) 300 MG capsule Take 300 mg by mouth.    [DISCONTINUED] cyclobenzaprine (FLEXERIL) 10 MG tablet Take 10 mg by mouth.    [DISCONTINUED] omeprazole (PRILOSEC) 20 MG capsule Take 20 mg by mouth.    [DISCONTINUED] ondansetron (ZOFRAN) 4 MG tablet Take 1 tablet (4 mg total) by mouth every 12 (twelve) hours as needed.    [DISCONTINUED] promethazine (PHENERGAN) 25 MG tablet Take 1 tablet (25 mg total) by mouth every 6 (six) hours as needed for Nausea.     Family History     Problem Relation (Age of Onset)    Depression Maternal Grandfather    Diabetes Father, Sister    Hypertension Mother, Father        Social History Main Topics    Smoking status: Never Smoker    Smokeless tobacco: Never Used    Alcohol use Yes      Comment: occasionally    Drug use: No    Sexual activity: Not Currently     Review of Systems   Constitutional: Negative for chills, diaphoresis, fatigue and fever.   HENT: Negative for congestion, sore throat and voice change.    Eyes: Negative for photophobia and visual disturbance.   Respiratory: Negative for cough, shortness of breath, wheezing and stridor.    Cardiovascular: Positive for chest pain and palpitations. Negative for leg swelling.   Gastrointestinal: Positive for abdominal distention ( chronic and normal size per patient), nausea and vomiting. Negative for abdominal pain, constipation and diarrhea.   Endocrine: Negative for polydipsia, polyphagia and polyuria.   Genitourinary: Negative for decreased urine volume, difficulty urinating, dysuria, flank pain, pelvic pain,  urgency and vaginal discharge.   Musculoskeletal: Negative for back pain, joint swelling, neck pain and neck stiffness.   Skin: Negative for color change and rash.   Allergic/Immunologic: Negative for immunocompromised state.   Neurological: Positive for weakness ( left). Negative for dizziness, syncope, numbness and headaches.   Hematological: Does not bruise/bleed easily.   Psychiatric/Behavioral: Negative for agitation, behavioral problems and confusion.     Objective:     Vital Signs (Most Recent):  Temp: 100 °F (37.8 °C) (09/11/17 2007)  Pulse: 100 (09/11/17 2007)  Resp: 16 (09/11/17 2007)  BP: 131/71 (09/11/17 2007)  SpO2: 100 % (09/11/17 2007) Vital Signs (24h Range):  Temp:  [100 °F (37.8 °C)] 100 °F (37.8 °C)  Pulse:  [100] 100  Resp:  [16] 16  SpO2:  [100 %] 100 %  BP: (131)/(71) 131/71     Weight: 77.1 kg (170 lb)  Body mass index is 30.11 kg/m².    Physical Exam   Constitutional: She is oriented to person, place, and time. She appears well-developed and well-nourished. No distress.   HENT:   Head: Normocephalic and atraumatic.   Nose: Nose normal.   Eyes: EOM are normal. Pupils are equal, round, and reactive to light. No scleral icterus.   Jaundice     Neck: Normal range of motion. Neck supple. No tracheal deviation present.   Cardiovascular: Normal rate, regular rhythm, normal heart sounds and intact distal pulses.    No murmur heard.  Pulmonary/Chest: Effort normal and breath sounds normal. No stridor. No respiratory distress. She has no wheezes. She has no rales.   Abdominal: Soft. Bowel sounds are normal. She exhibits distension. There is tenderness ( mild epigastric). There is no guarding.   No ascites     Genitourinary:   Genitourinary Comments: Deferred     Musculoskeletal: Normal range of motion. She exhibits no edema or deformity.   Neurological: She is alert and oriented to person, place, and time. No cranial nerve deficit.   Skin: Skin is warm and dry. Capillary refill takes 2 to 3 seconds. No  rash noted. She is not diaphoretic.   juandice     Psychiatric: She has a normal mood and affect. Her behavior is normal. Judgment and thought content normal.   Nursing note and vitals reviewed.       Significant Labs: All pertinent labs within the past 24 hours have been reviewed.   Results for orders placed or performed during the hospital encounter of 09/11/17   CBC auto differential   Result Value Ref Range    WBC 11.55 3.90 - 12.70 K/uL    RBC 4.04 4.00 - 5.40 M/uL    Hemoglobin 8.7 (L) 12.0 - 16.0 g/dL    Hematocrit 27.4 (L) 37.0 - 48.5 %    MCV 68 (L) 82 - 98 fL    MCH 21.5 (L) 27.0 - 31.0 pg    MCHC 31.8 (L) 32.0 - 36.0 g/dL    RDW 20.8 (H) 11.5 - 14.5 %    Platelets 146 (L) 150 - 350 K/uL    MPV SEE COMMENT 9.2 - 12.9 fL    Gran # 8.6 (H) 1.8 - 7.7 K/uL    Lymph # 1.6 1.0 - 4.8 K/uL    Mono # 1.3 (H) 0.3 - 1.0 K/uL    Eos # 0.0 0.0 - 0.5 K/uL    Baso # 0.03 0.00 - 0.20 K/uL    Gran% 74.6 (H) 38.0 - 73.0 %    Lymph% 13.8 (L) 18.0 - 48.0 %    Mono% 11.6 4.0 - 15.0 %    Eosinophil% 0.0 0.0 - 8.0 %    Basophil% 0.3 0.0 - 1.9 %    Platelet Estimate Appears normal     Aniso Slight     Poik Slight     Poly Occasional     Hypo Moderate     Target Cells Moderate     Stomatocytes Present     Differential Method Automated    Comprehensive metabolic panel   Result Value Ref Range    Sodium 134 (L) 136 - 145 mmol/L    Potassium 2.4 (LL) 3.5 - 5.1 mmol/L    Chloride 95 95 - 110 mmol/L    CO2 24 23 - 29 mmol/L    Glucose 94 70 - 110 mg/dL    BUN, Bld 5 (L) 6 - 20 mg/dL    Creatinine 0.8 0.5 - 1.4 mg/dL    Calcium 7.8 (L) 8.7 - 10.5 mg/dL    Total Protein 7.4 6.0 - 8.4 g/dL    Albumin 2.4 (L) 3.5 - 5.2 g/dL    Total Bilirubin 11.9 (H) 0.1 - 1.0 mg/dL    Alkaline Phosphatase 154 (H) 55 - 135 U/L    AST 77 (H) 10 - 40 U/L    ALT 21 10 - 44 U/L    Anion Gap 15 8 - 16 mmol/L    eGFR if African American >60 >60 mL/min/1.73 m^2    eGFR if non African American >60 >60 mL/min/1.73 m^2   Troponin I #1   Result Value Ref Range     Troponin I 0.006 0.000 - 0.026 ng/mL   B-Type natriuretic peptide (BNP)   Result Value Ref Range    BNP 19 0 - 99 pg/mL   Lipase   Result Value Ref Range    Lipase 6 4 - 60 U/L   Type & Screen   Result Value Ref Range    Group & Rh A NEG     Indirect Donato NEG          Significant Imaging: I have reviewed all pertinent imaging results/findings within the past 24 hours.   Imaging Results          X-Ray Chest AP Portable (Final result)  Result time 09/11/17 20:44:06    Final result by Pam Jean Jr., MD (09/11/17 20:44:06)                 Impression:     No acute cardiopulmonary disease.      Electronically signed by: PAM JEAN  Date:     09/11/17  Time:    20:44              Narrative:    Exam: Portable chest radiograph    History:    Chest pain    Findings:      The lungs are clear. The cardiac silhouette and mediastinum are within normal limits. The remaining osseous structures and soft tissues are within normal limits.                                Assessment/Plan:     * Chest pain    -obs with tele  -ASA  -trend troponin, first in ER neg  -EKG reviewed.  -Chest Xray reviewed, NAF  -Mg/Phos  -monitor  -consult cards if need            Hypokalemia    -Replete   -control nausea.   -monitor            Abdominal distension    -CT ABD and Pelvis  -Last visit with hepatology 4/2017 with MELD of 15. See Care everywhere notes.   -MELD today 26  -Follow up OP with Hepatologist and consult GI if need pending CT results.   -AST mildly elevated, and increased bilirubin. History of Liver disease   -PT/INR added            Anemia    -Patient with hx of iron deficiency anemia receives iron infusions per patient.   -Stable  -monitor  -Patient to continue OP treatment with Briana Bishop MD, next appointment 9/15/17.           Hypophosphatemia    Replenish  -monitor            Hypomagnesemia    replenish  -monitor          Weakness of distal arms and legs, of left    -Episodic with CP, resolved. Neuro exam  negative on assessment.   -head CT negative  -hypokalemia, hypomagnesemia and hypophosphatemia noted. Corrected Calcium 9.1  -monitor         Thrombocytopenia, unspecified    -stable  -Monitor  -hold pharmacological VTE mitigation          Jaundice    -OP follow up, consult GI if need.   -see abd distension            VTE Risk Mitigation     No phmaracologic: bleed risk  Non pharmacological: SCDs             Romeo Hylton NP  Department of Hospital Medicine   [facility]

## 2017-09-12 NOTE — PROGRESS NOTES
Report given to Anayeli LORA,  Pt c/o painful PIV site, no redness, edema or erythema noted, PIV site changed for comfort. New 20g PIV started to LFA x1 attempt.

## 2017-09-12 NOTE — PLAN OF CARE
CM met with patient regarding discharge planning.  The patient plans to return her daughter's home upon discharge.  The patient reports she self-transports to appointments however, one of her children will take her home at discharge.  CM educated patient on observation status.  Patient verbalized understanding.  No needs noted at this time.     09/12/17 1059   Discharge Assessment   Assessment Type Discharge Planning Assessment   Confirmed/corrected address and phone number on facesheet? Yes   Assessment information obtained from? Patient   Expected Length of Stay (days) 1   Communicated expected length of stay with patient/caregiver yes   Prior to hospitilization cognitive status: Alert/Oriented   Prior to hospitalization functional status: Assistive Equipment   Current cognitive status: Alert/Oriented   Current Functional Status: Assistive Equipment   Lives With child(sae), adult   Able to Return to Prior Arrangements yes   Is patient able to care for self after discharge? Yes   Patient's perception of discharge disposition home or selfcare   Readmission Within The Last 30 Days no previous admission in last 30 days   Patient currently being followed by outpatient case management? No   Patient currently receives any other outside agency services? No   Equipment Currently Used at Home cane, straight;walker, rolling   Do you have any problems affording any of your prescribed medications? No   Is the patient taking medications as prescribed? yes   Does the patient have transportation home? Yes   Transportation Available family or friend will provide   Does the patient receive services at the [facility]? No   Discharge Plan A Home with family   Discharge Plan B Home with family   Patient/Family In Agreement With Plan yes

## 2017-09-12 NOTE — HPI
Marcie Bazan is a 51 y.o. female patient with PMHx including HTN and liver disease who presents to the Emergency Department from [facility] urgent care via EMS for chest pain which onset gradually earlier today. The patient was given Nitro in route with resolution to pain. The patient currently denies CP at this time. Associated symptoms include reports nausea, emesis, palpitations and an episode of left side weakness. Pt reports abd distention that is chronic in nature and but appears worse today. PMHx liver disease, pt's currently under care of renal physician and hematologist. Last seen by Hepatology, Syd Bradshaw MD.

## 2017-09-12 NOTE — ED PROVIDER NOTES
SCRIBE #1 NOTE: I, Ashley Winter, am scribing for, and in the presence of, Gurdeep Koehler MD. I have scribed the entire note.      History      Chief Complaint   Patient presents with    Chest Pain     resolved after nitro. pain radiating to left arm with n/v       Review of patient's allergies indicates:  No Known Allergies     HPI   HPI    9/11/2017, 8:12 PM   History obtained from the patient      History of Present Illness: Marcie Bazan is a 51 y.o. female patient with PMHx HTN who presents to the Emergency Department from [facility] urgent care via EMS for chest pain which onset gradually earlier today. Sxs are intermittent and moderate in severity.  Pain resolved after Nitro administeed by EMS en route.  Pt also reports nausea, emesis, and intermittent palpitations. Pt reports abd distention onset 1 day ago. PMHx liver disease, pt's currently under care of renal physician and hematologist. There are no mitigating or exacerbating factors noted.  Pt denies any fever, diarrhea, SOB, HA, dizziness, hemiparesis, cough, blood in stool, abd pain, constipation, and all other sxs at this time. No further complaints or concerns at this time.     Arrival Mode: EMS    PCP: ODILIA Wall       Past Medical History:  Past Medical History:   Diagnosis Date    Hypertension     Liver cirrhosis        Past Surgical History:  Past Surgical History:   Procedure Laterality Date    BREAST SURGERY      CHOLECYSTECTOMY      HYSTERECTOMY           Family History:  Family History   Problem Relation Age of Onset    Hypertension Mother     Hypertension Father     Diabetes Father     Diabetes Sister     Depression Maternal Grandfather        Social History:  Social History     Social History Main Topics    Smoking status: Never Smoker    Smokeless tobacco: Never Used    Alcohol use Yes      Comment: occasionally    Drug use: No    Sexual activity: Not Currently       ROS   Review of Systems   Constitutional:  Negative for fever.   HENT: Negative for sore throat.    Respiratory: Negative for cough and shortness of breath.    Cardiovascular: Positive for chest pain and palpitations.   Gastrointestinal: Positive for abdominal distention, nausea and vomiting. Negative for abdominal pain, blood in stool, constipation and diarrhea.   Genitourinary: Negative for dysuria.   Musculoskeletal: Negative for back pain.   Skin: Negative for rash.   Neurological: Negative for weakness.   Hematological: Does not bruise/bleed easily.     Physical Exam      Initial Vitals [09/11/17 2007]   BP Pulse Resp Temp SpO2   131/71 100 16 100 °F (37.8 °C) 100 %      MAP       91          Physical Exam  Nursing Notes and Vital Signs Reviewed.  Constitutional: Patient is in no acute distress. Well-developed and well-nourished.  Head: Atraumatic. Normocephalic.  Eyes: PERRL. EOM intact. Conjunctivae are not pale. No scleral icterus.  ENT: Mucous membranes are moist. Oropharynx is clear and symmetric.    Neck: Supple. Full ROM. No lymphadenopathy.  Cardiovascular: Regular rate. Regular rhythm. No murmurs, rubs, or gallops. Distal pulses are 2+ and symmetric.  Pulmonary/Chest: No respiratory distress. Clear to auscultation bilaterally. No wheezing, rales, or rhonchi.  Abdominal: Somewhat distended.  There is no tenderness.  No rebound, guarding, or rigidity. Good bowel sounds.  Genitourinary: No CVA tenderness  Musculoskeletal: Moves all extremities. No obvious deformities. No edema. No calf tenderness.  Skin: Warm and dry. Jaundice.   Neurological:  Alert, awake, and appropriate.  Normal speech.  No acute focal neurological deficits are appreciated.  Psychiatric: Normal affect. Good eye contact. Appropriate in content.    ED Course    Procedures  ED Vital Signs:  Vitals:    09/11/17 2007 09/11/17 2307 09/11/17 2355 09/12/17 0016   BP: 131/71 (!) 159/78 (!) 149/73    Pulse: 100 103 105    Resp: 16 18 18    Temp: 100 °F (37.8 °C)  98.9 °F (37.2 °C)   "  TempSrc: Oral  Oral    SpO2: 100% 100% 100%    Weight: 77.1 kg (170 lb)  77.6 kg (171 lb) 75.8 kg (167 lb)   Height: 5' 3" (1.6 m)  5' 3" (1.6 m)     09/12/17 0300 09/12/17 0407   BP:  (!) 102/51   Pulse: 92 91   Resp:  18   Temp:  98.4 °F (36.9 °C)   TempSrc:  Oral   SpO2:  98%   Weight:  77.5 kg (170 lb 12.8 oz)   Height:         Abnormal Lab Results:  Labs Reviewed   CBC W/ AUTO DIFFERENTIAL - Abnormal; Notable for the following:        Result Value    Hemoglobin 8.7 (*)     Hematocrit 27.4 (*)     MCV 68 (*)     MCH 21.5 (*)     MCHC 31.8 (*)     RDW 20.8 (*)     Platelets 146 (*)     Gran # 8.6 (*)     Mono # 1.3 (*)     Gran% 74.6 (*)     Lymph% 13.8 (*)     All other components within normal limits   COMPREHENSIVE METABOLIC PANEL - Abnormal; Notable for the following:     Sodium 134 (*)     Potassium 2.4 (*)     BUN, Bld 5 (*)     Calcium 7.8 (*)     Albumin 2.4 (*)     Total Bilirubin 11.9 (*)     Alkaline Phosphatase 154 (*)     AST 77 (*)     All other components within normal limits    Narrative:     K  critical result(s) called and verbal readback obtained from Jonnie Arellano RN, 09/11/2017 21:46   MAGNESIUM - Abnormal; Notable for the following:     Magnesium 1.3 (*)     All other components within normal limits   PHOSPHORUS - Abnormal; Notable for the following:     Phosphorus 1.8 (*)     All other components within normal limits   PROTIME-INR - Abnormal; Notable for the following:     Prothrombin Time 21.4 (*)     INR 2.1 (*)     All other components within normal limits   APTT - Abnormal; Notable for the following:     aPTT 32.4 (*)     All other components within normal limits   TROPONIN I   B-TYPE NATRIURETIC PEPTIDE   LIPASE   TROPONIN I   MAGNESIUM   PHOSPHORUS   PROTIME-INR   APTT   URINALYSIS   TYPE & SCREEN        All Lab Results:  Results for orders placed or performed during the hospital encounter of 09/11/17   CBC auto differential   Result Value Ref Range    WBC 11.55 3.90 - 12.70 K/uL "    RBC 4.04 4.00 - 5.40 M/uL    Hemoglobin 8.7 (L) 12.0 - 16.0 g/dL    Hematocrit 27.4 (L) 37.0 - 48.5 %    MCV 68 (L) 82 - 98 fL    MCH 21.5 (L) 27.0 - 31.0 pg    MCHC 31.8 (L) 32.0 - 36.0 g/dL    RDW 20.8 (H) 11.5 - 14.5 %    Platelets 146 (L) 150 - 350 K/uL    MPV SEE COMMENT 9.2 - 12.9 fL    Gran # 8.6 (H) 1.8 - 7.7 K/uL    Lymph # 1.6 1.0 - 4.8 K/uL    Mono # 1.3 (H) 0.3 - 1.0 K/uL    Eos # 0.0 0.0 - 0.5 K/uL    Baso # 0.03 0.00 - 0.20 K/uL    Gran% 74.6 (H) 38.0 - 73.0 %    Lymph% 13.8 (L) 18.0 - 48.0 %    Mono% 11.6 4.0 - 15.0 %    Eosinophil% 0.0 0.0 - 8.0 %    Basophil% 0.3 0.0 - 1.9 %    Platelet Estimate Appears normal     Aniso Slight     Poik Slight     Poly Occasional     Hypo Moderate     Target Cells Moderate     Stomatocytes Present     Differential Method Automated    Comprehensive metabolic panel   Result Value Ref Range    Sodium 134 (L) 136 - 145 mmol/L    Potassium 2.4 (LL) 3.5 - 5.1 mmol/L    Chloride 95 95 - 110 mmol/L    CO2 24 23 - 29 mmol/L    Glucose 94 70 - 110 mg/dL    BUN, Bld 5 (L) 6 - 20 mg/dL    Creatinine 0.8 0.5 - 1.4 mg/dL    Calcium 7.8 (L) 8.7 - 10.5 mg/dL    Total Protein 7.4 6.0 - 8.4 g/dL    Albumin 2.4 (L) 3.5 - 5.2 g/dL    Total Bilirubin 11.9 (H) 0.1 - 1.0 mg/dL    Alkaline Phosphatase 154 (H) 55 - 135 U/L    AST 77 (H) 10 - 40 U/L    ALT 21 10 - 44 U/L    Anion Gap 15 8 - 16 mmol/L    eGFR if African American >60 >60 mL/min/1.73 m^2    eGFR if non African American >60 >60 mL/min/1.73 m^2   Troponin I #1   Result Value Ref Range    Troponin I 0.006 0.000 - 0.026 ng/mL   B-Type natriuretic peptide (BNP)   Result Value Ref Range    BNP 19 0 - 99 pg/mL   Lipase   Result Value Ref Range    Lipase 6 4 - 60 U/L   Troponin I #2   Result Value Ref Range    Troponin I 0.016 0.000 - 0.026 ng/mL   Magnesium   Result Value Ref Range    Magnesium 1.3 (L) 1.6 - 2.6 mg/dL   Phosphorus   Result Value Ref Range    Phosphorus 1.8 (L) 2.7 - 4.5 mg/dL   Protime-INR   Result Value Ref Range     Prothrombin Time 21.4 (H) 9.0 - 12.5 sec    INR 2.1 (H) 0.8 - 1.2   APTT   Result Value Ref Range    aPTT 32.4 (H) 21.0 - 32.0 sec   Type & Screen   Result Value Ref Range    Group & Rh A NEG     Indirect Donato NEG          Imaging Results:  Imaging Results          CT Head Without Contrast (Final result)  Result time 09/11/17 22:58:14    Final result by Pola Chen MD (09/11/17 22:58:14)                 Impression:        No acute intracranial findings.      All CT scans at this facility use dose modulation, iterative reconstruction and/or weight based dosing when appropriate to reduced radiation dose to as low as reasonably achievable.        Electronically signed by: POLA CHEN MD  Date:     09/11/17  Time:    22:58              Narrative:    EXAM:CT HEAD WITHOUT CONTRAST 09/11/17 22:43:31      HISTORY: Altered mental status.  Weakness.  Lethargy.    TECHNIQUE: Standard axial imaging performed with reformatted sagittal and coronal images.       COMPARISON: None      FINDINGS:    Midline structures intact.  Age-appropriate atrophy present with mild prominence of ventricular system and cortical sulci.  Normal differentiation of gray white matter.    Skull base and calvarium are normal.  Visualized paranasal sinuses and mastoid air cells are clear.                             CT Abdomen Pelvis With Contrast (Final result)  Result time 09/11/17 23:04:34    Final result by Pola Chen MD (09/11/17 23:04:34)                 Impression:        Cirrhotic liver.  Innumerable hypodense nodules most likely represent regenerating nodules.    Moderate ascites.    Mild nonspecific circumferential small bowel wall thickening in the mid small bowel left abdomen.  Finding could indicate enteritis.      All CT scans at this facility use dose modulation, iterative reconstruction and/or weight based dosing when appropriate to reduced radiation dose to as low as reasonably  achievable.        Electronically signed by: RHODA CHEN MD  Date:     09/11/17  Time:    23:04              Narrative:    EXAM:CT ABDOMEN PELVIS WITH CONTRAST 09/11/17 22:43:31      HISTORY: Abdominal distention    TECHNIQUE: Standard axial imaging performed with reformatted sagittal and coronal images.  Image is performed with IV contrast.      COMPARISON: None      FINDINGS:    Lung bases clear.    The liver is small in size and nodular with an enlarged caudate lobe.  Findings compatible with cirrhosis.  Diffuse heterogeneity with numerous centimeter hypodense nodules scattered diffusely throughout the liver.  These nodules may represent regenerating nodules.    Portal vein is patent.  Few collateral vessels are identified likely due to portal hypertension.  Moderate ascites present within the abdomen and pelvis.  The spleen is normal in size.    The pancreas is normal.  Adrenal glands are normal.  Aorta and IVC are normal.    Kidneys and ureters are normal.    Small hiatal hernia otherwise the stomach is normal.  Small intestine demonstrates mild circumferential small bowel wall thickening of the left mid abdomen which is nonspecific but could indicate enteritis.  The appendix is normal.  Colon is normal.  The rectum is normal.    Uterus surgically absent.  Adnexa regions are normal.  Bladder collapsed.    No suspicious bony abnormality detected.                             X-Ray Chest AP Portable (Final result)  Result time 09/11/17 20:44:06    Final result by Pam Jean Jr., MD (09/11/17 20:44:06)                 Impression:     No acute cardiopulmonary disease.      Electronically signed by: PAM JEAN  Date:     09/11/17  Time:    20:44              Narrative:    Exam: Portable chest radiograph    History:    Chest pain    Findings:      The lungs are clear. The cardiac silhouette and mediastinum are within normal limits. The remaining osseous structures and soft tissues are within  normal limits.                             The EKG was ordered, reviewed, and independently interpreted by the ED provider.  Interpretation time: 20:17   Rate: 99 BPM  Rhythm: normal sinus rhythm  Interpretation: Prolonged QT. No STEMI.           The Emergency Provider reviewed the vital signs and test results, which are outlined above.    ED Discussion     9:53 PM: Discussed case with Dr. Allison ([facility]). Dr. Allison agrees with current care and management of pt and accepts admission.   Admitting Service: [facility] medicine   Admitting Physician: Dr. Allison  Admit to: Obs    9:59 PM: Re-evaluated pt. I have discussed test results, shared treatment plan, and the need for admission with patient at bedside. Pt  expresses understanding at this time and agree with all information. All questions answered. Pt has no further questions or concerns at this time. Pt is ready for admit.          ED Medication(s):  Medications   aspirin tablet 325 mg (not administered)   lisinopril tablet 5 mg (not administered)   nitroGLYCERIN SL tablet 0.4 mg (not administered)   pantoprazole EC tablet 40 mg (not administered)   ramelteon tablet 8 mg (not administered)   potassium phosphate 30 mmol in dextrose 5 % 500 mL infusion (30 mmol Intravenous New Bag 9/12/17 0354)   ondansetron injection 4 mg (not administered)   ondansetron injection 4 mg (4 mg Intravenous Given 9/11/17 2126)   0.9 % NaCl with KCl 20 mEq infusion (1,000 mLs Intravenous New Bag 9/11/17 2215)   potassium chloride SA CR tablet 60 mEq (60 mEq Oral Given 9/11/17 2217)   omnipaque 350 iohexol 75 mL (75 mLs Intravenous Given 9/11/17 2248)   magnesium sulfate in dextrose IVPB (premix) 1 g (1 g Intravenous New Bag 9/12/17 5926)       Current Discharge Medication List          Follow-up Information     ODILIA Wall.    Specialty:  Family Medicine  Contact information:  [facility] [facility] [address] [phone]               Briana Don .    Why:  Keep F/U appointment scheduled 9/15/17 for Anemia and Iron treatments                   Medical Decision Making    Medical Decision Making:   Clinical Tests:   Lab Tests: Reviewed and Ordered  Radiological Study: Reviewed and Ordered  Medical Tests: Reviewed and Ordered  Other:   I have discussed this case with another health care provider.           Scribe Attestation:   Scribe #1: I performed the above scribed service and the documentation accurately describes the services I performed. I attest to the accuracy of the note.    Attending:   Physician Attestation Statement for Scribe #1: I, Gurdeep Koehler MD, personally performed the services described in this documentation, as scribed by Ashley Winter, in my presence, and it is both accurate and complete.          Clinical Impression       ICD-10-CM ICD-9-CM   1. Jaundice R17 782.4   2. Chest pain R07.9 786.50   3. Hypokalemia E87.6 276.8   4. Anemia, unspecified type D64.9 285.9       Disposition:   Disposition: Placed in Observation  Condition: Fair         Gurdeep Koehler MD  09/12/17 0553

## 2017-09-12 NOTE — ASSESSMENT & PLAN NOTE
It appears she has been seen at [facility] hepatology clinic but it has been a while  She now presents with what appears to be progression of her symptoms  GI consulted

## 2017-09-12 NOTE — ASSESSMENT & PLAN NOTE
-obs with tele  -ASA  -trend troponin, first in ER neg  -EKG reviewed.  -Chest Xray reviewed, NAF  -Mg/Phos  -monitor  -consult cards if need    9/12/17: resolved as symptoms are more GI than cardiac  CE are negative

## 2017-09-12 NOTE — CONSULTS
[facility] -   Gastroenterology  Consult Note    Patient Name: Marcie Bazan  MRN: [id number]  Admission Date: 9/11/2017  Hospital Length of Stay: 0 days  Code Status: Full Code   Attending Provider: Crystal Bee MD   Consulting Provider: Aleyda Acosta PA-C  Primary Care Physician: ODILIA Wall  Principal Problem:Chest pain    Inpatient consult to Gastroenterology  Consult performed by: ALEYDA ACOSTA  Consult ordered by: CRYSTAL BEE  Reason for consult: Elevated bilirubin in patient with cirrhosis        Subjective:     HPI:  The patient presented to the ER for chest pain which relieved with Nitro. ACS was ruled out. We have been consulted for elevated bilirubin. The patient has a history of liver cirrhosis secondary to PERKINS. She reports swollen abdomen. She says this comes and goes. It usually lasts a few days, but this time it hasn't resolved. It is usually associated with nausea and vomiting. She denies a change in bowel habits, hematochezia or melena. A CT scan showed cirrhotic liver with hypodense nodules most likely representing regenerating nodules. She also had moderate ascites and mild nonspecific circumferential small bowel wall thickening. A review of Care Everywhere showed the liver nodules were known and not felt to be HCC. The ascites is new. She says she has never been drained before. Labs show INR up from 1.4 to 2.1. Total bilirubin increased to 11 without significant change in AST or ALT. She denies any change in medications. She denies taking OTC medications or supplements. No recent antibiotic use. UA doesn't look infected. CXR without acute disease. Afebrile with normal WBC count. The patient has chronic microcytic anemia. Currently stable.     Past Medical History:   Diagnosis Date    Hypertension     Liver cirrhosis        Past Surgical History:   Procedure Laterality Date    BREAST SURGERY      CHOLECYSTECTOMY      HYSTERECTOMY         Review of  patient's allergies indicates:   Allergen Reactions    Ferrous sulfate      Patient states the pill makes her sick. She stated she would rather have a shot     Family History     Problem Relation (Age of Onset)    Depression Maternal Grandfather    Diabetes Father, Sister    Hypertension Mother, Father        Social History Main Topics    Smoking status: Never Smoker    Smokeless tobacco: Never Used    Alcohol use Yes      Comment: occasionally    Drug use: No    Sexual activity: Not Currently     Review of Systems   Constitutional: Negative for fever.   HENT: Negative for hearing loss.    Eyes: Negative for visual disturbance.   Respiratory: Positive for shortness of breath. Negative for cough.    Cardiovascular: Positive for chest pain (resolved).   Gastrointestinal:        As per HPI.   Genitourinary: Negative for dysuria, frequency and hematuria.   Musculoskeletal: Negative for arthralgias and back pain.   Skin: Negative for rash.   Neurological: Negative for seizures, syncope, numbness and headaches.   Hematological: Does not bruise/bleed easily.   Psychiatric/Behavioral: The patient is not nervous/anxious.      Objective:     Vital Signs (Most Recent):  Temp: 98.4 °F (36.9 °C) (09/12/17 1200)  Pulse: 102 (09/12/17 1200)  Resp: 20 (09/12/17 1200)  BP: 128/62 (09/12/17 1200)  SpO2: 99 % (09/12/17 1200) Vital Signs (24h Range):  Temp:  [97.6 °F (36.4 °C)-100 °F (37.8 °C)] 98.4 °F (36.9 °C)  Pulse:  [] 102  Resp:  [16-20] 20  SpO2:  [98 %-100 %] 99 %  BP: (102-159)/(51-78) 128/62     Weight: 77.5 kg (170 lb 12.8 oz) (09/12/17 0407)  Body mass index is 30.26 kg/m².      Intake/Output Summary (Last 24 hours) at 09/12/17 1544  Last data filed at 09/12/17 0600   Gross per 24 hour   Intake              700 ml   Output                0 ml   Net              700 ml       Lines/Drains/Airways     Peripheral Intravenous Line                 Peripheral IV - Single Lumen 09/12/17 0129 Left Forearm less than 1 day                 Physical Exam   Constitutional: She is oriented to person, place, and time. Vital signs are normal. She appears well-developed and well-nourished.   HENT:   Head: Normocephalic and atraumatic.   Eyes: EOM are normal. Scleral icterus is present.   Neck: Normal range of motion. Neck supple. Carotid bruit is not present.   Cardiovascular: Normal rate and regular rhythm.    No murmur heard.  Pulmonary/Chest: Effort normal and breath sounds normal. No respiratory distress. She has no wheezes.   Abdominal: Normal appearance and bowel sounds are normal. She exhibits distension. She exhibits no mass. There is tenderness (mild generalized). There is no rebound and no guarding.   Musculoskeletal: She exhibits no edema.   Neurological: She is alert and oriented to person, place, and time. No cranial nerve deficit.   Skin: Skin is warm and dry. No rash noted.   Psychiatric: Her behavior is normal.       Significant Labs:  CBC:   Recent Labs  Lab 09/11/17 2047 09/12/17  0543   WBC 11.55 9.72   HGB 8.7* 8.2*   HCT 27.4* 25.5*   * 126*     CMP:   Recent Labs  Lab 09/12/17  0543   *   CALCIUM 7.7*   ALBUMIN 2.1*   PROT 6.7      K 2.8*   CO2 29   CL 98   BUN 4*   CREATININE 0.7   ALKPHOS 137*   ALT 20   AST 67*   BILITOT 11.4*     Coagulation:   Recent Labs  Lab 09/11/17  2045   INR 2.1*   APTT 32.4*       Significant Imaging:  Imaging results within the past 24 hours have been reviewed.    Assessment/Plan:     * Chest pain    Resolved.         Liver cirrhosis secondary to PERKINS    50 yo female with decompensated liver cirrhosis.   Recommend paracentesis for new ascites. Send fluid for analysis.   Monitor MELD labs daily. In April, it was documented as 15.     MELD-Na score: 25 at 9/12/2017  5:43 AM  MELD score: 24 at 9/12/2017  5:43 AM  Calculated from:  Serum Creatinine: 0.7 mg/dL (Rounded to 1) at 9/12/2017  5:43 AM  Serum Sodium: 136 mmol/L at 9/12/2017  5:43 AM  Total Bilirubin: 11.4 mg/dL at  9/12/2017  5:43 AM  INR(ratio): 2.1 at 9/11/2017  8:45 PM  Age: 51 years          Abnormal CT scan, small bowel    CT scan suggested enteritis, but lacked oral contrast which limits the study. Also ascites can give a similar appearance. No reason to think she has enteritis at this time. If needed, could consider SBFT.         Hypophosphatemia    Replaced and improved.   Monitor        Hypomagnesemia    Replaced and improved.   Monitor        Ascites    Paracentesis will be ordered with fluid analysis.         Thrombocytopenia, unspecified    Secondary to cirrhosis.   No overt bleeding.         Jaundice    Secondary to elevated bilirubin.         Hypokalemia    Needs additional replacement per HM.   Monitor        Anemia    Chronic, stable during admission. Follows with Hematology as an outpatient.   No overt bleeding.               Thank you for your consult. I will follow-up with patient. Please contact us if you have any additional questions.    Nabil Acosta PA-C  Gastroenterology  [facility]

## 2017-09-12 NOTE — PROGRESS NOTES
[facility] Hospital Medicine  Progress Note    Patient Name: Marcie Bazan  MRN: [id number]  Patient Class: OP- Observation   Admission Date: 9/11/2017  Length of Stay: 0 days  Attending Physician: Crystal Shileds MD  Primary Care Provider: ODILIA Wall        Subjective:     Principal Problem:Chest pain    HPI:  Marcie Bazan is a 51 y.o. female patient with PMHx  Including HTN and liver disease who presents to the Emergency Department from [facility] urgent care via EMS for chest pain which onset gradually earlier today. The patient was given Nitro in route with resolution to pain. The patient currently denies CP at this time. Her CP is intermittent and moderate in severity. Associated symptoms include reports nausea, emesis, palpitations and an episode of left side weakness. Pt reports abd distention that is chronic in nature and but appears worse today. PMHx liver disease, pt's currently under care of renal physician and hematologist. There are no mitigating or exacerbating factors noted.  Pt denies any fever, diarrhea, SOB, HA, dizziness, hemiparesis, cough, blood in stool, abd pain, constipation, and all other sxs at this time. No further complaints or concerns at this time. Last seen by Hepatology, Syd Bradshaw MD - 04/11/2017         Hospital Course:  No notes on file    Interval History: 50 yo female with PERKINS cirrhosis who presented complaining of GI discomfort associated with chest pain. She has been ruled out for ACS and it appears her symptoms are mostly GI related. She is feeling better since admissions but still has some discomfort and abdominal distension.    Review of Systems   Constitutional: Positive for appetite change.   HENT: Negative.    Respiratory: Negative.    Cardiovascular: Negative.    Gastrointestinal: Positive for abdominal distention, nausea and vomiting.   Musculoskeletal: Negative.    Skin: Negative.    All other systems reviewed and are  negative.    Objective:     Vital Signs (Most Recent):  Temp: 98.4 °F (36.9 °C) (09/12/17 1200)  Pulse: 102 (09/12/17 1200)  Resp: 20 (09/12/17 1200)  BP: 128/62 (09/12/17 1200)  SpO2: 99 % (09/12/17 1200) Vital Signs (24h Range):  Temp:  [97.6 °F (36.4 °C)-100 °F (37.8 °C)] 98.4 °F (36.9 °C)  Pulse:  [] 102  Resp:  [16-20] 20  SpO2:  [98 %-100 %] 99 %  BP: (102-159)/(51-78) 128/62     Weight: 77.5 kg (170 lb 12.8 oz)  Body mass index is 30.26 kg/m².    Intake/Output Summary (Last 24 hours) at 09/12/17 1507  Last data filed at 09/12/17 0600   Gross per 24 hour   Intake              700 ml   Output                0 ml   Net              700 ml      Physical Exam   Constitutional: She is oriented to person, place, and time. She appears well-developed and well-nourished.   HENT:   Head: Normocephalic and atraumatic.   Right Ear: External ear normal.   Left Ear: External ear normal.   Nose: Nose normal.   Mouth/Throat: Oropharynx is clear and moist.   Eyes: EOM are normal. Pupils are equal, round, and reactive to light. Scleral icterus (mild) is present.   Neck: Normal range of motion. Neck supple.   Cardiovascular: Normal rate, regular rhythm, normal heart sounds and intact distal pulses.    Pulmonary/Chest: Effort normal and breath sounds normal.   Abdominal: Soft. Bowel sounds are normal. She exhibits distension.   Musculoskeletal: Normal range of motion.   Neurological: She is alert and oriented to person, place, and time.   Skin: Skin is warm and dry.   Psychiatric: She has a normal mood and affect.   Nursing note and vitals reviewed.      Significant Labs:   Recent Lab Results       09/12/17  1205 09/12/17  1000 09/12/17  0543 09/11/17  2328 09/11/17  2051      Albumin   2.1(L)       Alkaline Phosphatase   137(H)       ALT   20       Anion Gap   9       Aniso   Moderate       Appearance, UA  Clear        aPTT          AST   67(H)       Baso #   0.03       Basophil%   0.3       Bilirubin (UA)   3+  Comment:  Positive urine bilirubin is not confirmed. Correlate with   serum bilirubin and clinical presentation.  (A)        Total Bilirubin   11.4  Comment:  For infants and newborns, interpretation of results should be based  on gestational age, weight and in agreement with clinical  observations.  Premature Infant recommended reference ranges:  Up to 24 hours.............<8.0 mg/dL  Up to 48 hours............<12.0 mg/dL  3-5 days..................<15.0 mg/dL  6-29 days.................<15.0 mg/dL  (H)       BNP          BUN, Bld   4(L)       Calcium   7.7(L)       Chloride   98       CO2   29       Color, UA  Yellow        Creatinine   0.7       Differential Method   Automated       eGFR if    >60       eGFR if non    >60  Comment:  Calculation used to obtain the estimated glomerular filtration  rate (eGFR) is the CKD-EPI equation. Since race is unknown   in our information system, the eGFR values for   -American and Non--American patients are given   for each creatinine result.         Eos #   0.1       Eosinophil%   0.7       Free T4   0.84       Glucose   119(H)       Glucose, UA  Negative        Gran #   6.2       Gran%   63.6       Group & Rh     A NEG     Hematocrit   25.5(L)       Hemoglobin   8.2(L)       Hypo   Occasional       INDIRECT KATT     NEG     Coumadin Monitoring INR          Ketones, UA  Negative        Leukocytes, UA  Negative        Lipase          Lymph #   2.3       Lymph%   24.1       Magnesium   2.3       MCH   21.7(L)       MCHC   32.2       MCV   68(L)       Mono #   1.1(H)       Mono%   11.6       MPV   SEE COMMENT  Comment:  Result not available.       Nitrite, UA  Negative        Occult Blood UA  Trace(A)        pH, UA  6.0        Phosphorus   2.8       Platelet Estimate          Platelets   126(L)       Poik   Moderate       Poly          Potassium   2.8(L)       Total Protein   6.7       Protein, UA  Negative  Comment:  Recommend  a 24 hour urine protein or a urine   protein/creatinine ratio if globulin induced proteinuria is  clinically suspected.          Protime          RBC   3.78(L)       RDW   21.0(H)       Sodium   136       Specific Leslie, UA  1.025        Specimen UA  Urine, Clean Catch        Stomatocytes          Target Cells   Moderate       Troponin I 0.008  Comment:  The reference interval for Troponin I represents the 99th percentile   cutoff   for our facility and is consistent with 3rd generation assay   performance.    0.009  Comment:  The reference interval for Troponin I represents the 99th percentile   cutoff   for our facility and is consistent with 3rd generation assay   performance.   0.016  Comment:  The reference interval for Troponin I represents the 99th percentile   cutoff   for our facility and is consistent with 3rd generation assay   performance.        TSH   0.926       Urobilinogen, UA  2.0-3.0(A)        WBC   9.72                   09/11/17 2047 09/11/17 2045      Albumin 2.4(L)      Alkaline Phosphatase 154(H)      ALT 21      Anion Gap 15      Aniso Slight      Appearance, UA       aPTT  32.4  Comment:  aPTT therapeutic range = 39-69 seconds(H)     AST 77(H)      Baso # 0.03      Basophil% 0.3      Bilirubin (UA)       Total Bilirubin 11.9  Comment:  For infants and newborns, interpretation of results should be based  on gestational age, weight and in agreement with clinical  observations.  Premature Infant recommended reference ranges:  Up to 24 hours.............<8.0 mg/dL  Up to 48 hours............<12.0 mg/dL  3-5 days..................<15.0 mg/dL  6-29 days.................<15.0 mg/dL  (H)      BNP 19  Comment:  Values of less than 100 pg/ml are consistent with non-CHF populations.      BUN, Bld 5(L)      Calcium 7.8(L)      Chloride 95      CO2 24      Color, UA       Creatinine 0.8      Differential Method Automated      eGFR if  >60      eGFR if non   >60  Comment:  Calculation used to obtain the estimated glomerular filtration  rate (eGFR) is the CKD-EPI equation. Since race is unknown   in our information system, the eGFR values for   -American and Non--American patients are given   for each creatinine result.        Eos # 0.0      Eosinophil% 0.0      Free T4       Glucose 94      Glucose, UA       Gran # 8.6(H)      Gran% 74.6(H)      Group & Rh       Hematocrit 27.4(L)      Hemoglobin 8.7(L)      Hypo Moderate      INDIRECT KATT       Coumadin Monitoring INR  2.1  Comment:  Coumadin Therapy:  2.0 - 3.0 for INR for all indicators except mechanical heart valves  and antiphospholipid syndromes which should use 2.5 - 3.5.  (H)     Ketones, UA       Leukocytes, UA       Lipase 6      Lymph # 1.6      Lymph% 13.8(L)      Magnesium 1.3(L)      MCH 21.5(L)      MCHC 31.8(L)      MCV 68(L)      Mono # 1.3(H)      Mono% 11.6      MPV SEE COMMENT  Comment:  Result not available.      Nitrite, UA       Occult Blood UA       pH, UA       Phosphorus 1.8(L)      Platelet Estimate Appears normal      Platelets 146(L)      Poik Slight      Poly Occasional      Potassium 2.4  Comment:  K  critical result(s) called and verbal readback obtained from Jonnie Arellano RN, 09/11/2017 21:46  (LL)      Total Protein 7.4      Protein, UA       Protime  21.4(H)     RBC 4.04      RDW 20.8(H)      Sodium 134(L)      Specific Gravity, UA       Specimen UA       Stomatocytes Present      Target Cells Moderate      Troponin I 0.006  Comment:  The reference interval for Troponin I represents the 99th percentile   cutoff   for our facility and is consistent with 3rd generation assay   performance.        TSH       Urobilinogen, UA       WBC 11.55            Significant Imaging:   Imaging Results          CT Head Without Contrast (Final result)  Result time 09/11/17 22:58:14    Final result by Pola Clay MD (09/11/17 22:58:14)                 Impression:        No  acute intracranial findings.      All CT scans at this facility use dose modulation, iterative reconstruction and/or weight based dosing when appropriate to reduced radiation dose to as low as reasonably achievable.        Electronically signed by: POLA CHEN MD  Date:     09/11/17  Time:    22:58              Narrative:    EXAM:CT HEAD WITHOUT CONTRAST 09/11/17 22:43:31      HISTORY: Altered mental status.  Weakness.  Lethargy.    TECHNIQUE: Standard axial imaging performed with reformatted sagittal and coronal images.       COMPARISON: None      FINDINGS:    Midline structures intact.  Age-appropriate atrophy present with mild prominence of ventricular system and cortical sulci.  Normal differentiation of gray white matter.    Skull base and calvarium are normal.  Visualized paranasal sinuses and mastoid air cells are clear.                             CT Abdomen Pelvis With Contrast (Final result)  Result time 09/11/17 23:04:34    Final result by Pola Chen MD (09/11/17 23:04:34)                 Impression:        Cirrhotic liver.  Innumerable hypodense nodules most likely represent regenerating nodules.    Moderate ascites.    Mild nonspecific circumferential small bowel wall thickening in the mid small bowel left abdomen.  Finding could indicate enteritis.      All CT scans at this facility use dose modulation, iterative reconstruction and/or weight based dosing when appropriate to reduced radiation dose to as low as reasonably achievable.        Electronically signed by: POLA CHEN MD  Date:     09/11/17  Time:    23:04              Narrative:    EXAM:CT ABDOMEN PELVIS WITH CONTRAST 09/11/17 22:43:31      HISTORY: Abdominal distention    TECHNIQUE: Standard axial imaging performed with reformatted sagittal and coronal images.  Image is performed with IV contrast.      COMPARISON: None      FINDINGS:    Lung bases clear.    The liver is small in size and nodular with an enlarged  caudate lobe.  Findings compatible with cirrhosis.  Diffuse heterogeneity with numerous centimeter hypodense nodules scattered diffusely throughout the liver.  These nodules may represent regenerating nodules.    Portal vein is patent.  Few collateral vessels are identified likely due to portal hypertension.  Moderate ascites present within the abdomen and pelvis.  The spleen is normal in size.    The pancreas is normal.  Adrenal glands are normal.  Aorta and IVC are normal.    Kidneys and ureters are normal.    Small hiatal hernia otherwise the stomach is normal.  Small intestine demonstrates mild circumferential small bowel wall thickening of the left mid abdomen which is nonspecific but could indicate enteritis.  The appendix is normal.  Colon is normal.  The rectum is normal.    Uterus surgically absent.  Adnexa regions are normal.  Bladder collapsed.    No suspicious bony abnormality detected.                             X-Ray Chest AP Portable (Final result)  Result time 09/11/17 20:44:06    Final result by Pam Jean Jr., MD (09/11/17 20:44:06)                 Impression:     No acute cardiopulmonary disease.      Electronically signed by: PAM JEAN  Date:     09/11/17  Time:    20:44              Narrative:    Exam: Portable chest radiograph    History:    Chest pain    Findings:      The lungs are clear. The cardiac silhouette and mediastinum are within normal limits. The remaining osseous structures and soft tissues are within normal limits.                                Assessment/Plan:      * Chest pain    -obs with tele  -ASA  -trend troponin, first in ER neg  -EKG reviewed.  -Chest Xray reviewed, NAF  -Mg/Phos  -monitor  -consult cards if need    9/12/17: resolved as symptoms are more GI than cardiac  CE are negative            Hypophosphatemia    Replenish  -monitor            Hypomagnesemia    replenish  -monitor    9/12/17:  repleted as she had a significant amount of vomiting         Weakness of distal arms and legs, of left    -Episodic with CP, resolved. Neuro exam negative on assessment.   -head CT negative  -hypokalemia, hypomagnesemia and hypophosphatemia noted. Corrected Calcium 9.1  -monitor     9/12/17: likely related to electrolyte abnormality.  Continue to replete as symptoms have improved        Abdominal distension    -CT ABD and Pelvis  -Last visit with hepatology 4/2017 with MELD of 15. See Care everywhere notes.   -MELD today 26  -Follow up OP with Hepatologist and consult GI if need pending CT results.   -AST mildly elevated, and increased bilirubin. History of Liver disease   -PT/INR added    9/12/17: she has moderate amount of ascites and could possible benefit from paracentesis  GI consulted          Thrombocytopenia, unspecified    -stable  -Monitor  -hold pharmacological VTE mitigation          Jaundice    -OP follow up, consult GI if need.   -see abd distension    9/12/17: elevated bilirubin  GI consulted        Hypokalemia    -Replete   -control nausea.   -monitor            Liver cirrhosis secondary to PERKINS    It appears [facility] but it has been a while  She now presents with what appears to be progression of her symptoms  GI consulted          Anemia    -Patient with hx of iron deficiency anemia receives iron infusions per patient.   -Stable  -monitor  -Patient to continue OP treatment with Braina Bishop MD, next appointment 9/15/17.             VTE Risk Mitigation         Ordered     Low Risk of VTE  Once      09/11/17 9842              Crystal Shields MD  Department of Hospital Medicine   [facility]

## 2017-09-12 NOTE — SUBJECTIVE & OBJECTIVE
Interval History: 52 yo female with PERKINS cirrhosis who presented complaining of GI discomfort associated with chest pain. She has been ruled out for ACS and it appears her symptoms are mostly GI related. She is feeling better since admissions but still has some discomfort and abdominal distension.    Review of Systems   Constitutional: Positive for appetite change.   HENT: Negative.    Respiratory: Negative.    Cardiovascular: Negative.    Gastrointestinal: Positive for abdominal distention, nausea and vomiting.   Musculoskeletal: Negative.    Skin: Negative.    All other systems reviewed and are negative.    Objective:     Vital Signs (Most Recent):  Temp: 98.4 °F (36.9 °C) (09/12/17 1200)  Pulse: 102 (09/12/17 1200)  Resp: 20 (09/12/17 1200)  BP: 128/62 (09/12/17 1200)  SpO2: 99 % (09/12/17 1200) Vital Signs (24h Range):  Temp:  [97.6 °F (36.4 °C)-100 °F (37.8 °C)] 98.4 °F (36.9 °C)  Pulse:  [] 102  Resp:  [16-20] 20  SpO2:  [98 %-100 %] 99 %  BP: (102-159)/(51-78) 128/62     Weight: 77.5 kg (170 lb 12.8 oz)  Body mass index is 30.26 kg/m².    Intake/Output Summary (Last 24 hours) at 09/12/17 1507  Last data filed at 09/12/17 0600   Gross per 24 hour   Intake              700 ml   Output                0 ml   Net              700 ml      Physical Exam   Constitutional: She is oriented to person, place, and time. She appears well-developed and well-nourished.   HENT:   Head: Normocephalic and atraumatic.   Right Ear: External ear normal.   Left Ear: External ear normal.   Nose: Nose normal.   Mouth/Throat: Oropharynx is clear and moist.   Eyes: EOM are normal. Pupils are equal, round, and reactive to light. Scleral icterus (mild) is present.   Neck: Normal range of motion. Neck supple.   Cardiovascular: Normal rate, regular rhythm, normal heart sounds and intact distal pulses.    Pulmonary/Chest: Effort normal and breath sounds normal.   Abdominal: Soft. Bowel sounds are normal. She exhibits distension.    Musculoskeletal: Normal range of motion.   Neurological: She is alert and oriented to person, place, and time.   Skin: Skin is warm and dry.   Psychiatric: She has a normal mood and affect.   Nursing note and vitals reviewed.      Significant Labs:   Recent Lab Results       09/12/17  1205 09/12/17  1000 09/12/17  0543 09/11/17  2328 09/11/17  2051      Albumin   2.1(L)       Alkaline Phosphatase   137(H)       ALT   20       Anion Gap   9       Aniso   Moderate       Appearance, UA  Clear        aPTT          AST   67(H)       Baso #   0.03       Basophil%   0.3       Bilirubin (UA)  3+  Comment:  Positive urine bilirubin is not confirmed. Correlate with   serum bilirubin and clinical presentation.  (A)        Total Bilirubin   11.4  Comment:  For infants and newborns, interpretation of results should be based  on gestational age, weight and in agreement with clinical  observations.  Premature Infant recommended reference ranges:  Up to 24 hours.............<8.0 mg/dL  Up to 48 hours............<12.0 mg/dL  3-5 days..................<15.0 mg/dL  6-29 days.................<15.0 mg/dL  (H)       BNP          BUN, Bld   4(L)       Calcium   7.7(L)       Chloride   98       CO2   29       Color, UA  Yellow        Creatinine   0.7       Differential Method   Automated       eGFR if    >60       eGFR if non    >60  Comment:  Calculation used to obtain the estimated glomerular filtration  rate (eGFR) is the CKD-EPI equation. Since race is unknown   in our information system, the eGFR values for   -American and Non--American patients are given   for each creatinine result.         Eos #   0.1       Eosinophil%   0.7       Free T4   0.84       Glucose   119(H)       Glucose, UA  Negative        Gran #   6.2       Gran%   63.6       Group & Rh     A NEG     Hematocrit   25.5(L)       Hemoglobin   8.2(L)       Hypo   Occasional       INDIRECT KATT     NEG     Coumadin  Monitoring INR          Ketones, UA  Negative        Leukocytes, UA  Negative        Lipase          Lymph #   2.3       Lymph%   24.1       Magnesium   2.3       MCH   21.7(L)       MCHC   32.2       MCV   68(L)       Mono #   1.1(H)       Mono%   11.6       MPV   SEE COMMENT  Comment:  Result not available.       Nitrite, UA  Negative        Occult Blood UA  Trace(A)        pH, UA  6.0        Phosphorus   2.8       Platelet Estimate          Platelets   126(L)       Poik   Moderate       Poly          Potassium   2.8(L)       Total Protein   6.7       Protein, UA  Negative  Comment:  Recommend a 24 hour urine protein or a urine   protein/creatinine ratio if globulin induced proteinuria is  clinically suspected.          Protime          RBC   3.78(L)       RDW   21.0(H)       Sodium   136       Specific [place], UA  1.025        Specimen UA  Urine, Clean Catch        Stomatocytes          Target Cells   Moderate       Troponin I 0.008  Comment:  The reference interval for Troponin I represents the 99th percentile   cutoff   for our facility and is consistent with 3rd generation assay   performance.    0.009  Comment:  The reference interval for Troponin I represents the 99th percentile   cutoff   for our facility and is consistent with 3rd generation assay   performance.   0.016  Comment:  The reference interval for Troponin I represents the 99th percentile   cutoff   for our facility and is consistent with 3rd generation assay   performance.        TSH   0.926       Urobilinogen, UA  2.0-3.0(A)        WBC   9.72                   09/11/17 2047 09/11/17 2045      Albumin 2.4(L)      Alkaline Phosphatase 154(H)      ALT 21      Anion Gap 15      Aniso Slight      Appearance, UA       aPTT  32.4  Comment:  aPTT therapeutic range = 39-69 seconds(H)     AST 77(H)      Baso # 0.03      Basophil% 0.3      Bilirubin (UA)       Total Bilirubin 11.9  Comment:  For infants and newborns, interpretation of results should be  based  on gestational age, weight and in agreement with clinical  observations.  Premature Infant recommended reference ranges:  Up to 24 hours.............<8.0 mg/dL  Up to 48 hours............<12.0 mg/dL  3-5 days..................<15.0 mg/dL  6-29 days.................<15.0 mg/dL  (H)      BNP 19  Comment:  Values of less than 100 pg/ml are consistent with non-CHF populations.      BUN, Bld 5(L)      Calcium 7.8(L)      Chloride 95      CO2 24      Color, UA       Creatinine 0.8      Differential Method Automated      eGFR if  >60      eGFR if non  >60  Comment:  Calculation used to obtain the estimated glomerular filtration  rate (eGFR) is the CKD-EPI equation. Since race is unknown   in our information system, the eGFR values for   -American and Non--American patients are given   for each creatinine result.        Eos # 0.0      Eosinophil% 0.0      Free T4       Glucose 94      Glucose, UA       Gran # 8.6(H)      Gran% 74.6(H)      Group & Rh       Hematocrit 27.4(L)      Hemoglobin 8.7(L)      Hypo Moderate      INDIRECT KATT       Coumadin Monitoring INR  2.1  Comment:  Coumadin Therapy:  2.0 - 3.0 for INR for all indicators except mechanical heart valves  and antiphospholipid syndromes which should use 2.5 - 3.5.  (H)     Ketones, UA       Leukocytes, UA       Lipase 6      Lymph # 1.6      Lymph% 13.8(L)      Magnesium 1.3(L)      MCH 21.5(L)      MCHC 31.8(L)      MCV 68(L)      Mono # 1.3(H)      Mono% 11.6      MPV SEE COMMENT  Comment:  Result not available.      Nitrite, UA       Occult Blood UA       pH, UA       Phosphorus 1.8(L)      Platelet Estimate Appears normal      Platelets 146(L)      Poik Slight      Poly Occasional      Potassium 2.4  Comment:  K  critical result(s) called and verbal readback obtained from Jonnie Arellano RN, 09/11/2017 21:46  (LL)      Total Protein 7.4      Protein, UA       Protime  21.4(H)     RBC 4.04      RDW 20.8(H)       Sodium 134(L)      Specific Gravity, UA       Specimen UA       Stomatocytes Present      Target Cells Moderate      Troponin I 0.006  Comment:  The reference interval for Troponin I represents the 99th percentile   cutoff   for our facility and is consistent with 3rd generation assay   performance.        TSH       Urobilinogen, UA       WBC 11.55            Significant Imaging:   Imaging Results          CT Head Without Contrast (Final result)  Result time 09/11/17 22:58:14    Final result by Pola Chen MD (09/11/17 22:58:14)                 Impression:        No acute intracranial findings.      All CT scans at this facility use dose modulation, iterative reconstruction and/or weight based dosing when appropriate to reduced radiation dose to as low as reasonably achievable.        Electronically signed by: POLA CHEN MD  Date:     09/11/17  Time:    22:58              Narrative:    EXAM:CT HEAD WITHOUT CONTRAST 09/11/17 22:43:31      HISTORY: Altered mental status.  Weakness.  Lethargy.    TECHNIQUE: Standard axial imaging performed with reformatted sagittal and coronal images.       COMPARISON: None      FINDINGS:    Midline structures intact.  Age-appropriate atrophy present with mild prominence of ventricular system and cortical sulci.  Normal differentiation of gray white matter.    Skull base and calvarium are normal.  Visualized paranasal sinuses and mastoid air cells are clear.                             CT Abdomen Pelvis With Contrast (Final result)  Result time 09/11/17 23:04:34    Final result by Pola Chen MD (09/11/17 23:04:34)                 Impression:        Cirrhotic liver.  Innumerable hypodense nodules most likely represent regenerating nodules.    Moderate ascites.    Mild nonspecific circumferential small bowel wall thickening in the mid small bowel left abdomen.  Finding could indicate enteritis.      All CT scans at this facility use dose modulation,  iterative reconstruction and/or weight based dosing when appropriate to reduced radiation dose to as low as reasonably achievable.        Electronically signed by: RHODA CHEN MD  Date:     09/11/17  Time:    23:04              Narrative:    EXAM:CT ABDOMEN PELVIS WITH CONTRAST 09/11/17 22:43:31      HISTORY: Abdominal distention    TECHNIQUE: Standard axial imaging performed with reformatted sagittal and coronal images.  Image is performed with IV contrast.      COMPARISON: None      FINDINGS:    Lung bases clear.    The liver is small in size and nodular with an enlarged caudate lobe.  Findings compatible with cirrhosis.  Diffuse heterogeneity with numerous centimeter hypodense nodules scattered diffusely throughout the liver.  These nodules may represent regenerating nodules.    Portal vein is patent.  Few collateral vessels are identified likely due to portal hypertension.  Moderate ascites present within the abdomen and pelvis.  The spleen is normal in size.    The pancreas is normal.  Adrenal glands are normal.  Aorta and IVC are normal.    Kidneys and ureters are normal.    Small hiatal hernia otherwise the stomach is normal.  Small intestine demonstrates mild circumferential small bowel wall thickening of the left mid abdomen which is nonspecific but could indicate enteritis.  The appendix is normal.  Colon is normal.  The rectum is normal.    Uterus surgically absent.  Adnexa regions are normal.  Bladder collapsed.    No suspicious bony abnormality detected.                             X-Ray Chest AP Portable (Final result)  Result time 09/11/17 20:44:06    Final result by Pam Jean Jr., MD (09/11/17 20:44:06)                 Impression:     No acute cardiopulmonary disease.      Electronically signed by: PAM JEAN  Date:     09/11/17  Time:    20:44              Narrative:    Exam: Portable chest radiograph    History:    Chest pain    Findings:      The lungs are clear. The cardiac  silhouette and mediastinum are within normal limits. The remaining osseous structures and soft tissues are within normal limits.

## 2017-09-12 NOTE — HOSPITAL COURSE
Ultimately, pt was admitted due to electrolyte imbalances and decompensated cirrhosis secondary to PERKINS. She was having chest pain and abdominal distention and chest pain was related to GI symptoms - serial troponins were negative. Also, pt had reported left sided weakness that was intermittent (thought due to low electrolytes). Pt had hypokalemia, hypophosphatemia and hypomagnesemia were repleted. CXR showed no acute cardiopulmonary disease. CT head with no acute intracranial findings. CT chest/abd with Cirrhotic liver with Innumerable hypodense nodules most likely represent regenerating nodules and moderate ascites. GI saw the patient and after patient given FFP (elevated INR) she underwent a paracentesis due to new ascites (2.5 L) - SBP was ruled out. Later, aldactone was prescribed to help stabilize potassium level - electrolytes were repleted. Pt with hx of chronic constipation which was relieved with stool softeners. Also, pt had low electrolytes due to chronic emesis. Labs returned to baseline and abdominal distension had decreased. She was seen and examined and determined to be safe and stable for discharge. Pt was advised to follow up with PCP and Hepatology. Miralax and Aldactone were e prescribed to designated pharmacy.

## 2017-09-12 NOTE — ASSESSMENT & PLAN NOTE
-CT ABD and Pelvis  -Last visit with hepatology 4/2017 with MELD of 15. See Care everywhere notes.   -MELD today 26  -Follow up OP with Hepatologist and consult GI if need pending CT results.   -AST mildly elevated, and increased bilirubin. History of Liver disease   -PT/INR added

## 2017-09-12 NOTE — ASSESSMENT & PLAN NOTE
-CT ABD and Pelvis  -Last visit with hepatology 4/2017 with MELD of 15. See Care everywhere notes.   -MELD today 26  -Follow up OP with Hepatologist and consult GI if need pending CT results.   -AST mildly elevated, and increased bilirubin. History of Liver disease   -PT/INR added    9/12/17: she has moderate amount of ascites and could possible benefit from paracentesis  GI consulted

## 2017-09-12 NOTE — ASSESSMENT & PLAN NOTE
Chronic, stable during admission. Follows with Hematology as an outpatient.   No overt bleeding.

## 2017-09-12 NOTE — ASSESSMENT & PLAN NOTE
50 yo female with decompensated liver cirrhosis.   Recommend paracentesis for new ascites. Send fluid for analysis.   Monitor MELD labs daily. In April, it was documented as 15.     MELD-Na score: 25 at 9/12/2017  5:43 AM  MELD score: 24 at 9/12/2017  5:43 AM  Calculated from:  Serum Creatinine: 0.7 mg/dL (Rounded to 1) at 9/12/2017  5:43 AM  Serum Sodium: 136 mmol/L at 9/12/2017  5:43 AM  Total Bilirubin: 11.4 mg/dL at 9/12/2017  5:43 AM  INR(ratio): 2.1 at 9/11/2017  8:45 PM  Age: 51 years

## 2017-09-12 NOTE — PLAN OF CARE
Problem: Patient Care Overview  Goal: Plan of Care Review  Outcome: Ongoing (interventions implemented as appropriate)  Plan of care reviewed and discussed with patient.  No acute distress noted.  Safety and fall precautions reviewed and discussed with patient.  Patient free from injury.  Pain and N/V managed adequately.  Oral hydration and ambulation promoted.  Call bell and personal items in reach.  Bed in low position and locked.  Side rails up x2.  Encouraged to call if any assistance needed.  Will continue to monitor.

## 2017-09-12 NOTE — SUBJECTIVE & OBJECTIVE
Past Medical History:   Diagnosis Date    Hypertension     Liver cirrhosis        Past Surgical History:   Procedure Laterality Date    BREAST SURGERY      CHOLECYSTECTOMY      HYSTERECTOMY         Review of patient's allergies indicates:   Allergen Reactions    Ferrous sulfate      Patient states the pill makes her sick. She stated she would rather have a shot     Family History     Problem Relation (Age of Onset)    Depression Maternal Grandfather    Diabetes Father, Sister    Hypertension Mother, Father        Social History Main Topics    Smoking status: Never Smoker    Smokeless tobacco: Never Used    Alcohol use Yes      Comment: occasionally    Drug use: No    Sexual activity: Not Currently     Review of Systems   Constitutional: Negative for fever.   HENT: Negative for hearing loss.    Eyes: Negative for visual disturbance.   Respiratory: Positive for shortness of breath. Negative for cough.    Cardiovascular: Positive for chest pain (resolved).   Gastrointestinal:        As per HPI.   Genitourinary: Negative for dysuria, frequency and hematuria.   Musculoskeletal: Negative for arthralgias and back pain.   Skin: Negative for rash.   Neurological: Negative for seizures, syncope, numbness and headaches.   Hematological: Does not bruise/bleed easily.   Psychiatric/Behavioral: The patient is not nervous/anxious.      Objective:     Vital Signs (Most Recent):  Temp: 98.4 °F (36.9 °C) (09/12/17 1200)  Pulse: 102 (09/12/17 1200)  Resp: 20 (09/12/17 1200)  BP: 128/62 (09/12/17 1200)  SpO2: 99 % (09/12/17 1200) Vital Signs (24h Range):  Temp:  [97.6 °F (36.4 °C)-100 °F (37.8 °C)] 98.4 °F (36.9 °C)  Pulse:  [] 102  Resp:  [16-20] 20  SpO2:  [98 %-100 %] 99 %  BP: (102-159)/(51-78) 128/62     Weight: 77.5 kg (170 lb 12.8 oz) (09/12/17 0407)  Body mass index is 30.26 kg/m².      Intake/Output Summary (Last 24 hours) at 09/12/17 1544  Last data filed at 09/12/17 0600   Gross per 24 hour   Intake               700 ml   Output                0 ml   Net              700 ml       Lines/Drains/Airways     Peripheral Intravenous Line                 Peripheral IV - Single Lumen 09/12/17 0129 Left Forearm less than 1 day                Physical Exam   Constitutional: She is oriented to person, place, and time. Vital signs are normal. She appears well-developed and well-nourished.   HENT:   Head: Normocephalic and atraumatic.   Eyes: EOM are normal. Scleral icterus is present.   Neck: Normal range of motion. Neck supple. Carotid bruit is not present.   Cardiovascular: Normal rate and regular rhythm.    No murmur heard.  Pulmonary/Chest: Effort normal and breath sounds normal. No respiratory distress. She has no wheezes.   Abdominal: Normal appearance and bowel sounds are normal. She exhibits distension. She exhibits no mass. There is tenderness (mild generalized). There is no rebound and no guarding.   Musculoskeletal: She exhibits no edema.   Neurological: She is alert and oriented to person, place, and time. No cranial nerve deficit.   Skin: Skin is warm and dry. No rash noted.   Psychiatric: Her behavior is normal.       Significant Labs:  CBC:   Recent Labs  Lab 09/11/17 2047 09/12/17  0543   WBC 11.55 9.72   HGB 8.7* 8.2*   HCT 27.4* 25.5*   * 126*     CMP:   Recent Labs  Lab 09/12/17  0543   *   CALCIUM 7.7*   ALBUMIN 2.1*   PROT 6.7      K 2.8*   CO2 29   CL 98   BUN 4*   CREATININE 0.7   ALKPHOS 137*   ALT 20   AST 67*   BILITOT 11.4*     Coagulation:   Recent Labs  Lab 09/11/17 2045   INR 2.1*   APTT 32.4*       Significant Imaging:  Imaging results within the past 24 hours have been reviewed.

## 2017-09-13 PROBLEM — D68.9 COAGULOPATHY: Status: ACTIVE | Noted: 2017-09-13

## 2017-09-13 LAB
AFP SERPL-MCNC: 23 NG/ML
ALBUMIN FLD-MCNC: 0.9 G/DL
ALBUMIN SERPL BCP-MCNC: 2.1 G/DL
ALBUMIN SERPL BCP-MCNC: 2.3 G/DL
ALP SERPL-CCNC: 133 U/L
ALP SERPL-CCNC: 136 U/L
ALT SERPL W/O P-5'-P-CCNC: 18 U/L
ALT SERPL W/O P-5'-P-CCNC: 19 U/L
AMMONIA PLAS-SCNC: 44 UMOL/L
ANION GAP SERPL CALC-SCNC: 8 MMOL/L
ANION GAP SERPL CALC-SCNC: 9 MMOL/L
ANISOCYTOSIS BLD QL SMEAR: ABNORMAL
APPEARANCE FLD: CLEAR
AST SERPL-CCNC: 62 U/L
AST SERPL-CCNC: 63 U/L
BASOPHILS # BLD AUTO: 0.02 K/UL
BASOPHILS NFR BLD: 0.2 %
BILIRUB SERPL-MCNC: 10.1 MG/DL
BILIRUB SERPL-MCNC: 10.7 MG/DL
BLD PROD TYP BPU: NORMAL
BLOOD UNIT EXPIRATION DATE: NORMAL
BLOOD UNIT TYPE CODE: 6200
BLOOD UNIT TYPE: NORMAL
BODY FLD TYPE: NORMAL
BUN SERPL-MCNC: 4 MG/DL
BUN SERPL-MCNC: 5 MG/DL
CALCIUM SERPL-MCNC: 8 MG/DL
CALCIUM SERPL-MCNC: 8.2 MG/DL
CHLORIDE SERPL-SCNC: 97 MMOL/L
CHLORIDE SERPL-SCNC: 99 MMOL/L
CO2 SERPL-SCNC: 29 MMOL/L
CO2 SERPL-SCNC: 29 MMOL/L
CODING SYSTEM: NORMAL
COLOR FLD: YELLOW
CREAT SERPL-MCNC: 0.6 MG/DL
CREAT SERPL-MCNC: 0.6 MG/DL
DIFFERENTIAL METHOD: ABNORMAL
DISPENSE STATUS: NORMAL
EOSINOPHIL # BLD AUTO: 0.1 K/UL
EOSINOPHIL NFR BLD: 1.6 %
ERYTHROCYTE [DISTWIDTH] IN BLOOD BY AUTOMATED COUNT: 21.3 %
EST. GFR  (AFRICAN AMERICAN): >60 ML/MIN/1.73 M^2
EST. GFR  (AFRICAN AMERICAN): >60 ML/MIN/1.73 M^2
EST. GFR  (NON AFRICAN AMERICAN): >60 ML/MIN/1.73 M^2
EST. GFR  (NON AFRICAN AMERICAN): >60 ML/MIN/1.73 M^2
GLUCOSE SERPL-MCNC: 103 MG/DL
GLUCOSE SERPL-MCNC: 90 MG/DL
HCT VFR BLD AUTO: 24.3 %
HGB BLD-MCNC: 8 G/DL
HYPOCHROMIA BLD QL SMEAR: ABNORMAL
INR PPP: 1.9
LYMPHOCYTES # BLD AUTO: 2.1 K/UL
LYMPHOCYTES NFR BLD: 25.4 %
LYMPHOCYTES NFR FLD MANUAL: 37 %
MAGNESIUM SERPL-MCNC: 1.8 MG/DL
MCH RBC QN AUTO: 22 PG
MCHC RBC AUTO-ENTMCNC: 32.9 G/DL
MCV RBC AUTO: 67 FL
MONOCYTES # BLD AUTO: 1 K/UL
MONOCYTES NFR BLD: 11.7 %
MONOS+MACROS NFR FLD MANUAL: 54 %
NEUTROPHILS # BLD AUTO: 5 K/UL
NEUTROPHILS NFR BLD: 61.1 %
NEUTROPHILS NFR FLD MANUAL: 9 %
NUM UNITS TRANS FFP: NORMAL
OVALOCYTES BLD QL SMEAR: ABNORMAL
PHOSPHATE SERPL-MCNC: 2.4 MG/DL
PLATELET # BLD AUTO: 106 K/UL
PMV BLD AUTO: ABNORMAL FL
POIKILOCYTOSIS BLD QL SMEAR: ABNORMAL
POTASSIUM SERPL-SCNC: 2.6 MMOL/L
POTASSIUM SERPL-SCNC: 2.6 MMOL/L
PROT FLD-MCNC: 2.2 G/DL
PROT SERPL-MCNC: 6.5 G/DL
PROT SERPL-MCNC: 6.8 G/DL
PROTHROMBIN TIME: 19 SEC
RBC # BLD AUTO: 3.64 M/UL
SODIUM SERPL-SCNC: 135 MMOL/L
SODIUM SERPL-SCNC: 136 MMOL/L
SPECIMEN SOURCE: NORMAL
SPECIMEN SOURCE: NORMAL
TARGETS BLD QL SMEAR: ABNORMAL
WBC # BLD AUTO: 8.18 K/UL
WBC # FLD: 300 /CU MM

## 2017-09-13 PROCEDURE — 87070 CULTURE OTHR SPECIMN AEROBIC: CPT

## 2017-09-13 PROCEDURE — 84157 ASSAY OF PROTEIN OTHER: CPT

## 2017-09-13 PROCEDURE — 85025 COMPLETE CBC W/AUTO DIFF WBC: CPT

## 2017-09-13 PROCEDURE — P9047 ALBUMIN (HUMAN), 25%, 50ML: HCPCS | Performed by: FAMILY MEDICINE

## 2017-09-13 PROCEDURE — 36415 COLL VENOUS BLD VENIPUNCTURE: CPT

## 2017-09-13 PROCEDURE — 25000003 PHARM REV CODE 250: Performed by: NURSE PRACTITIONER

## 2017-09-13 PROCEDURE — 84100 ASSAY OF PHOSPHORUS: CPT

## 2017-09-13 PROCEDURE — 82105 ALPHA-FETOPROTEIN SERUM: CPT

## 2017-09-13 PROCEDURE — 85610 PROTHROMBIN TIME: CPT

## 2017-09-13 PROCEDURE — 89051 BODY FLUID CELL COUNT: CPT

## 2017-09-13 PROCEDURE — 82042 OTHER SOURCE ALBUMIN QUAN EA: CPT

## 2017-09-13 PROCEDURE — 87205 SMEAR GRAM STAIN: CPT

## 2017-09-13 PROCEDURE — 82140 ASSAY OF AMMONIA: CPT

## 2017-09-13 PROCEDURE — 80053 COMPREHEN METABOLIC PANEL: CPT

## 2017-09-13 PROCEDURE — 25000003 PHARM REV CODE 250: Performed by: FAMILY MEDICINE

## 2017-09-13 PROCEDURE — 99233 SBSQ HOSP IP/OBS HIGH 50: CPT | Mod: ,,, | Performed by: PHYSICIAN ASSISTANT

## 2017-09-13 PROCEDURE — 83735 ASSAY OF MAGNESIUM: CPT

## 2017-09-13 PROCEDURE — 0W9G3ZZ DRAINAGE OF PERITONEAL CAVITY, PERCUTANEOUS APPROACH: ICD-10-PCS | Performed by: RADIOLOGY

## 2017-09-13 PROCEDURE — 63600175 PHARM REV CODE 636 W HCPCS: Performed by: FAMILY MEDICINE

## 2017-09-13 PROCEDURE — 80053 COMPREHEN METABOLIC PANEL: CPT | Mod: 91

## 2017-09-13 PROCEDURE — P9017 PLASMA 1 DONOR FRZ W/IN 8 HR: HCPCS

## 2017-09-13 PROCEDURE — 21400001 HC TELEMETRY ROOM

## 2017-09-13 RX ORDER — ALBUMIN HUMAN 250 G/1000ML
12.5 SOLUTION INTRAVENOUS ONCE
Status: COMPLETED | OUTPATIENT
Start: 2017-09-13 | End: 2017-09-13

## 2017-09-13 RX ORDER — POTASSIUM CHLORIDE 20 MEQ/1
40 TABLET, EXTENDED RELEASE ORAL ONCE
Status: COMPLETED | OUTPATIENT
Start: 2017-09-13 | End: 2017-09-13

## 2017-09-13 RX ORDER — HYDROCODONE BITARTRATE AND ACETAMINOPHEN 500; 5 MG/1; MG/1
TABLET ORAL
Status: DISCONTINUED | OUTPATIENT
Start: 2017-09-13 | End: 2017-09-15 | Stop reason: HOSPADM

## 2017-09-13 RX ADMIN — ALBUMIN HUMAN 12.5 G: 0.25 SOLUTION INTRAVENOUS at 02:09

## 2017-09-13 RX ADMIN — PANTOPRAZOLE SODIUM 40 MG: 40 TABLET, DELAYED RELEASE ORAL at 08:09

## 2017-09-13 RX ADMIN — POTASSIUM CHLORIDE 40 MEQ: 1500 TABLET, EXTENDED RELEASE ORAL at 08:09

## 2017-09-13 RX ADMIN — LISINOPRIL 5 MG: 5 TABLET ORAL at 08:09

## 2017-09-13 NOTE — OP NOTE
Sterile technique was performed in the [facility], lidocaine was used as a local anesthetic.  2.5 liters of cale fluid removed for therapeutic purposes.  Pt tolerated the procedure well without immediate complications.  Please see radiologist report for details. F/u with PCP and/or ordering physician.

## 2017-09-13 NOTE — ASSESSMENT & PLAN NOTE
-CT ABD and Pelvis  -Last visit with hepatology 4/2017 with MELD of 15. See Care everywhere notes.   -MELD today 26  -Follow up OP with Hepatologist and consult GI if need pending CT results.   -AST mildly elevated, and increased bilirubin. History of Liver disease   -PT/INR added    9/12/17: she has moderate amount of ascites and could possible benefit from paracentesis  GI consulted    9/13/17: will get FFP so she can get paracentesis  Send fluid to analysis

## 2017-09-13 NOTE — ASSESSMENT & PLAN NOTE
50 yo female with acute decompensation liver cirrhosis likely from disease progression.   Await results of fluid analysis.    Monitor MELD labs daily. In April, it was documented as 15.     MELD-Na score: 24 at 9/13/2017 11:20 AM  MELD score: 23 at 9/13/2017 11:20 AM  Calculated from:  Serum Creatinine: 0.6 mg/dL (Rounded to 1) at 9/13/2017 11:20 AM  Serum Sodium: 135 mmol/L at 9/13/2017 11:20 AM  Total Bilirubin: 10.7 mg/dL at 9/13/2017 11:20 AM  INR(ratio): 1.9 at 9/13/2017  5:05 AM  Age: 51 years

## 2017-09-13 NOTE — ASSESSMENT & PLAN NOTE
-Patient with hx of iron deficiency anemia receives iron infusions per patient.   -Stable  -monitor  -Patient to continue OP treatment with Briana Bishop MD, next appointment 9/15/17.     9/13/17: H/H stable will transfuse PRN

## 2017-09-13 NOTE — ASSESSMENT & PLAN NOTE
It appears she has been seen [facility] but it has been a while  She now presents with what appears to be progression of her symptoms  GI consulted    9/13/17: she is decompensated and no clear etiology as what may have triggered it.  She will get FFP again today to correct INR for paracentesis  GI on case

## 2017-09-13 NOTE — PLAN OF CARE
Problem: Patient Care Overview  Goal: Plan of Care Review  Outcome: Ongoing (interventions implemented as appropriate)  Plan of care reviewed and discussed with patient.  No acute distress noted.  Safety and fall precautions reviewed and discussed with patient.  Patient free from injury.  Denies pain.  Regular diet maintained.  Ambulation promoted.  Call bell and personal items in reach.  Bed in low position and locked.  Side rails up x2.  Encouraged to call if any assistance needed.  Will continue to monitor.

## 2017-09-13 NOTE — ASSESSMENT & PLAN NOTE
CT scan suggested enteritis, but lacked oral contrast which limits the study. Also ascites can give a similar appearance. No reason to think she has enteritis at this time. If needed, could consider SBFT.   Today patient feels better after paracentesis.

## 2017-09-13 NOTE — INTERVAL H&P NOTE
The patient has been examined and the H&P has been reviewed:    I concur with the findings and no changes have occurred since H&P was written.        Active Hospital Problems    Diagnosis  POA    *Chest pain [R07.9]  Yes    Abnormal CT scan, small bowel [R93.3]  Yes    Jaundice [R17]  Yes    Thrombocytopenia, unspecified [D69.6]  Unknown    Ascites [R18.8]  Yes    Weakness of distal arms and legs, of left [R29.898]  Yes    Hypomagnesemia [E83.42]  Unknown    Hypophosphatemia [E83.39]  Unknown    Liver cirrhosis secondary to PERKINS [K75.81, K74.60]  Yes    Hypokalemia [E87.6]  Yes    Anemia [D64.9]  Yes      Resolved Hospital Problems    Diagnosis Date Resolved POA   No resolved problems to display.

## 2017-09-13 NOTE — SUBJECTIVE & OBJECTIVE
Interval History: 52 yo female with PERKINS presented complaining of chest pain which she ruled out for any cardiac issues but has PERKINS cirrhosis now with decompensated.  Review of Systems   Constitutional: Negative.    HENT: Positive for nosebleeds.    Eyes: Negative.    Respiratory: Negative.    Cardiovascular: Negative.    Gastrointestinal: Positive for abdominal distention.   Endocrine: Negative.    Genitourinary: Negative.    Musculoskeletal: Negative.    Psychiatric/Behavioral: Negative.      Objective:     Vital Signs (Most Recent):  Temp: 98.7 °F (37.1 °C) (09/13/17 0953)  Pulse: 100 (09/13/17 0953)  Resp: 20 (09/13/17 0953)  BP: 123/67 (09/13/17 0953)  SpO2: 99 % (09/13/17 0953) Vital Signs (24h Range):  Temp:  [98.1 °F (36.7 °C)-99.4 °F (37.4 °C)] 98.7 °F (37.1 °C)  Pulse:  [] 100  Resp:  [16-20] 20  SpO2:  [98 %-100 %] 99 %  BP: (118-143)/(60-72) 123/67     Weight: 83.3 kg (183 lb 11.2 oz)  Body mass index is 32.54 kg/m².    Intake/Output Summary (Last 24 hours) at 09/13/17 1029  Last data filed at 09/13/17 0700   Gross per 24 hour   Intake              562 ml   Output              500 ml   Net               62 ml      Physical Exam   Constitutional: She is oriented to person, place, and time. She appears well-developed and well-nourished.   HENT:   Head: Normocephalic and atraumatic.   Right Ear: External ear normal.   Left Ear: External ear normal.   Nose: Nose normal.   Mouth/Throat: Oropharynx is clear and moist.   Eyes: EOM are normal. Pupils are equal, round, and reactive to light. Scleral icterus is present.   Neck: Normal range of motion. Neck supple.   Cardiovascular: Normal rate, regular rhythm, normal heart sounds and intact distal pulses.    Pulmonary/Chest: Effort normal and breath sounds normal.   Abdominal: Soft. Bowel sounds are normal. She exhibits distension.   Musculoskeletal: Normal range of motion.   Neurological: She is alert and oriented to person, place, and time.   Skin: Skin  is warm and dry.   Psychiatric: She has a normal mood and affect.   Nursing note and vitals reviewed.      Significant Labs:   Recent Lab Results       09/13/17  0505 09/12/17  1547 09/12/17  1205      Albumin 2.1(L)       Alkaline Phosphatase 136(H)       ALT 18       Ammonia 44       Anion Gap 8       Aniso Moderate       AST 62(H)       Baso # 0.02       Basophil% 0.2       Total Bilirubin 10.1  Comment:  For infants and newborns, interpretation of results should be based  on gestational age, weight and in agreement with clinical  observations.  Premature Infant recommended reference ranges:  Up to 24 hours.............<8.0 mg/dL  Up to 48 hours............<12.0 mg/dL  3-5 days..................<15.0 mg/dL  6-29 days.................<15.0 mg/dL  (H)       BUN, Bld 4(L)       Calcium 8.0(L)       Chloride 99       CO2 29       Creatinine 0.6       Differential Method Automated       eGFR if  >60       eGFR if non  >60  Comment:  Calculation used to obtain the estimated glomerular filtration  rate (eGFR) is the CKD-EPI equation. Since race is unknown   in our information system, the eGFR values for   -American and Non--American patients are given   for each creatinine result.         Eos # 0.1       Eosinophil% 1.6       Glucose 90       Gran # 5.0       Gran% 61.1       Hematocrit 24.3(L)       Hemoglobin 8.0(L)       Hypo Occasional       Coumadin Monitoring INR 1.9  Comment:  Coumadin Therapy:  2.0 - 3.0 for INR for all indicators except mechanical heart valves  and antiphospholipid syndromes which should use 2.5 - 3.5.  (H)       Lymph # 2.1       Lymph% 25.4       Magnesium 1.8       MCH 22.0(L)       MCHC 32.9       MCV 67(L)       Mono # 1.0       Mono% 11.7       MPV SEE COMMENT  Comment:  Result not available.       Ovalocytes Occasional       Phosphorus 2.4(L)       Platelets 106(L)       Poik Moderate       Potassium 2.6  Comment:  K  critical result(s)  called and verbal readback obtained from Mandy Valero RN, 09/13/2017 07:30  (LL) 2.8(L)      Total Protein 6.5       Protime 19.0(H)       RBC 3.64(L)       RDW 21.3(H)       Sodium 136       Target Cells Moderate       Troponin I   0.008  Comment:  The reference interval for Troponin I represents the 99th percentile   cutoff   for our facility and is consistent with 3rd generation assay   performance.       WBC 8.18             Significant Imaging:   Imaging Results          CT Head Without Contrast (Final result)  Result time 09/11/17 22:58:14    Final result by Pola Clay MD (09/11/17 22:58:14)                 Impression:        No acute intracranial findings.      All CT scans at this facility use dose modulation, iterative reconstruction and/or weight based dosing when appropriate to reduced radiation dose to as low as reasonably achievable.        Electronically signed by: POLA CLAY MD  Date:     09/11/17  Time:    22:58              Narrative:    EXAM:CT HEAD WITHOUT CONTRAST 09/11/17 22:43:31      HISTORY: Altered mental status.  Weakness.  Lethargy.    TECHNIQUE: Standard axial imaging performed with reformatted sagittal and coronal images.       COMPARISON: None      FINDINGS:    Midline structures intact.  Age-appropriate atrophy present with mild prominence of ventricular system and cortical sulci.  Normal differentiation of gray white matter.    Skull base and calvarium are normal.  Visualized paranasal sinuses and mastoid air cells are clear.                             CT Abdomen Pelvis With Contrast (Final result)  Result time 09/11/17 23:04:34    Final result by Pola Clay MD (09/11/17 23:04:34)                 Impression:        Cirrhotic liver.  Innumerable hypodense nodules most likely represent regenerating nodules.    Moderate ascites.    Mild nonspecific circumferential small bowel wall thickening in the mid small bowel left abdomen.  Finding could indicate  enteritis.      All CT scans at this facility use dose modulation, iterative reconstruction and/or weight based dosing when appropriate to reduced radiation dose to as low as reasonably achievable.        Electronically signed by: RHODA CHEN MD  Date:     09/11/17  Time:    23:04              Narrative:    EXAM:CT ABDOMEN PELVIS WITH CONTRAST 09/11/17 22:43:31      HISTORY: Abdominal distention    TECHNIQUE: Standard axial imaging performed with reformatted sagittal and coronal images.  Image is performed with IV contrast.      COMPARISON: None      FINDINGS:    Lung bases clear.    The liver is small in size and nodular with an enlarged caudate lobe.  Findings compatible with cirrhosis.  Diffuse heterogeneity with numerous centimeter hypodense nodules scattered diffusely throughout the liver.  These nodules may represent regenerating nodules.    Portal vein is patent.  Few collateral vessels are identified likely due to portal hypertension.  Moderate ascites present within the abdomen and pelvis.  The spleen is normal in size.    The pancreas is normal.  Adrenal glands are normal.  Aorta and IVC are normal.    Kidneys and ureters are normal.    Small hiatal hernia otherwise the stomach is normal.  Small intestine demonstrates mild circumferential small bowel wall thickening of the left mid abdomen which is nonspecific but could indicate enteritis.  The appendix is normal.  Colon is normal.  The rectum is normal.    Uterus surgically absent.  Adnexa regions are normal.  Bladder collapsed.    No suspicious bony abnormality detected.                             X-Ray Chest AP Portable (Final result)  Result time 09/11/17 20:44:06    Final result by Pam Jean Jr., MD (09/11/17 20:44:06)                 Impression:     No acute cardiopulmonary disease.      Electronically signed by: PAM JEAN  Date:     09/11/17  Time:    20:44              Narrative:    Exam: Portable chest  radiograph    History:    Chest pain    Findings:      The lungs are clear. The cardiac silhouette and mediastinum are within normal limits. The remaining osseous structures and soft tissues are within normal limits.

## 2017-09-13 NOTE — PLAN OF CARE
Problem: Patient Care Overview  Goal: Plan of Care Review  Outcome: Ongoing (interventions implemented as appropriate)  Plan of care reviewed with patient. AAO x3. V/S stable. Patient complaining of 0/10 pain at this time. Patient on telemetry NS rhythm noted. Pt NPO for paracentesis. Received 1 unit of FFP. Fall precautions in place, patient free from fall/injury. Patient has no questions at this time. Will continue to monitor.

## 2017-09-13 NOTE — PROGRESS NOTES
[facility]  Gastroenterology  Progress Note    Patient Name: Marcie Bazan  MRN: [id number]  Admission Date: 9/11/2017  Hospital Length of Stay: 1 days  Code Status: Full Code   Attending Provider: Crystal Shields MD  Consulting Provider: Nabil Acosta PA-C  Primary Care Physician: ODILIA Wall  Principal Problem: Chest pain      Subjective:     Interval History:  The patient is feeling better today. She had a paracentesis this morning with 2.5 L removed. FFP given before. She is eating a little lunch. No vomiting. No BM during admission.     Review of Systems   Constitutional:        See Interval History for daily ROS.      Objective:     Vital Signs (Most Recent):  Temp: 98.7 °F (37.1 °C) (09/13/17 1131)  Pulse: 91 (09/13/17 1131)  Resp: 18 (09/13/17 1131)  BP: 117/65 (09/13/17 1131)  SpO2: 98 % (09/13/17 1131) Vital Signs (24h Range):  Temp:  [98.1 °F (36.7 °C)-99.4 °F (37.4 °C)] 98.7 °F (37.1 °C)  Pulse:  [] 91  Resp:  [16-20] 18  SpO2:  [98 %-100 %] 98 %  BP: (117-143)/(60-72) 117/65     Weight: 83.3 kg (183 lb 11.2 oz) (09/13/17 0400)  Body mass index is 32.54 kg/m².      Intake/Output Summary (Last 24 hours) at 09/13/17 1309  Last data filed at 09/13/17 0700   Gross per 24 hour   Intake              322 ml   Output              500 ml   Net             -178 ml       Lines/Drains/Airways     Peripheral Intravenous Line                 Peripheral IV - Single Lumen 09/12/17 0129 Left Forearm 1 day                Physical Exam   Constitutional: She is oriented to person, place, and time. She appears well-developed and well-nourished.   HENT:   Head: Normocephalic and atraumatic.   Cardiovascular: Normal rate and regular rhythm.    Pulmonary/Chest: Effort normal and breath sounds normal. No respiratory distress.   Abdominal: Soft. Bowel sounds are normal. She exhibits no distension. There is no tenderness.   Neurological: She is alert and oriented to person, place, and  time.   Psychiatric: Her behavior is normal.       Significant Labs:  CBC:   Recent Labs  Lab 09/11/17 2047 09/12/17  0543 09/13/17  0505   WBC 11.55 9.72 8.18   HGB 8.7* 8.2* 8.0*   HCT 27.4* 25.5* 24.3*   * 126* 106*     CMP:   Recent Labs  Lab 09/13/17  1120      CALCIUM 8.2*   ALBUMIN 2.3*   PROT 6.8   *   K 2.6*   CO2 29   CL 97   BUN 5*   CREATININE 0.6   ALKPHOS 133   ALT 19   AST 63*   BILITOT 10.7*     Coagulation:   Recent Labs  Lab 09/11/17 2045 09/13/17  0505   INR 2.1* 1.9*   APTT 32.4*  --          Significant Imaging:  Imaging results within the past 24 hours have been reviewed.    Assessment/Plan:     Liver cirrhosis secondary to PERKINS    52 yo female with acute decompensation liver cirrhosis likely from disease progression.   Await results of fluid analysis.    Monitor MELD labs daily. In April, it was documented as 15.     MELD-Na score: 24 at 9/13/2017 11:20 AM  MELD score: 23 at 9/13/2017 11:20 AM  Calculated from:  Serum Creatinine: 0.6 mg/dL (Rounded to 1) at 9/13/2017 11:20 AM  Serum Sodium: 135 mmol/L at 9/13/2017 11:20 AM  Total Bilirubin: 10.7 mg/dL at 9/13/2017 11:20 AM  INR(ratio): 1.9 at 9/13/2017  5:05 AM  Age: 51 years          Coagulopathy    Due to decompensated cirrhosis        Abnormal CT scan, small bowel    CT scan suggested enteritis, but lacked oral contrast which limits the study. Also ascites can give a similar appearance. No reason to think she has enteritis at this time. If needed, could consider SBFT.   Today patient feels better after paracentesis.        Hypophosphatemia    Needs replacement per HM.   Monitor        Hypomagnesemia    Replaced and improved.   Monitor        Ascites    S/p paracentesis. Await results of fluid analysis.         Thrombocytopenia, unspecified    Secondary to cirrhosis.   No overt bleeding.         Jaundice    Secondary to elevated bilirubin.         Hypokalemia    Needs additional replacement per HM.   Monitor         Anemia    Chronic, stable during admission. Follows with Hematology as an outpatient.   No overt bleeding.               Thank you for your consult. I will follow-up with patient. Please contact us if you have any additional questions.    Nabil Acosta PA-C  Gastroenterology  [facility]

## 2017-09-13 NOTE — SIGNIFICANT EVENT
Blood Transfusion Consent  A need for blood transfusion has been discussed with the patient. This procedure has been fully reviewed and I personally discussed the procedure in detail including the indication, risk, and benefits with the patient. Marcie Bazan acknowledges understanding and written informed consent has been obtained.

## 2017-09-13 NOTE — SUBJECTIVE & OBJECTIVE
Subjective:     Interval History:  The patient is feeling better today. She had a paracentesis this morning with 2.5 L removed. FFP given before. She is eating a little lunch. No vomiting. No BM during admission.     Review of Systems   Constitutional:        See Interval History for daily ROS.      Objective:     Vital Signs (Most Recent):  Temp: 98.7 °F (37.1 °C) (09/13/17 1131)  Pulse: 91 (09/13/17 1131)  Resp: 18 (09/13/17 1131)  BP: 117/65 (09/13/17 1131)  SpO2: 98 % (09/13/17 1131) Vital Signs (24h Range):  Temp:  [98.1 °F (36.7 °C)-99.4 °F (37.4 °C)] 98.7 °F (37.1 °C)  Pulse:  [] 91  Resp:  [16-20] 18  SpO2:  [98 %-100 %] 98 %  BP: (117-143)/(60-72) 117/65     Weight: 83.3 kg (183 lb 11.2 oz) (09/13/17 0400)  Body mass index is 32.54 kg/m².      Intake/Output Summary (Last 24 hours) at 09/13/17 1309  Last data filed at 09/13/17 0700   Gross per 24 hour   Intake              322 ml   Output              500 ml   Net             -178 ml       Lines/Drains/Airways     Peripheral Intravenous Line                 Peripheral IV - Single Lumen 09/12/17 0129 Left Forearm 1 day                Physical Exam   Constitutional: She is oriented to person, place, and time. She appears well-developed and well-nourished.   HENT:   Head: Normocephalic and atraumatic.   Cardiovascular: Normal rate and regular rhythm.    Pulmonary/Chest: Effort normal and breath sounds normal. No respiratory distress.   Abdominal: Soft. Bowel sounds are normal. She exhibits no distension. There is no tenderness.   Neurological: She is alert and oriented to person, place, and time.   Psychiatric: Her behavior is normal.       Significant Labs:  CBC:   Recent Labs  Lab 09/11/17  2047 09/12/17  0543 09/13/17  0505   WBC 11.55 9.72 8.18   HGB 8.7* 8.2* 8.0*   HCT 27.4* 25.5* 24.3*   * 126* 106*     CMP:   Recent Labs  Lab 09/13/17  1120      CALCIUM 8.2*   ALBUMIN 2.3*   PROT 6.8   *   K 2.6*   CO2 29   CL 97   BUN 5*    CREATININE 0.6   ALKPHOS 133   ALT 19   AST 63*   BILITOT 10.7*     Coagulation:   Recent Labs  Lab 09/11/17  2045 09/13/17  0505   INR 2.1* 1.9*   APTT 32.4*  --          Significant Imaging:  Imaging results within the past 24 hours have been reviewed.

## 2017-09-13 NOTE — PROGRESS NOTES
[facility] Hospital Medicine  Progress Note    Patient Name: Marcie Bazan  MRN: [id number]  Patient Class: IP- Inpatient   Admission Date: 9/11/2017  Length of Stay: 1 days  Attending Physician: Crystal Shields MD  Primary Care Provider: ODILIA Wall        Subjective:     Principal Problem:Chest pain    HPI:  Marcie Bazan is a 51 y.o. female patient with PMHx  Including HTN and liver disease who presents to the Emergency Department from [facility] urgent care via EMS for chest pain which onset gradually earlier today. The patient was given Nitro in route with resolution to pain. The patient currently denies CP at this time. Her CP is intermittent and moderate in severity. Associated symptoms include reports nausea, emesis, palpitations and an episode of left side weakness. Pt reports abd distention that is chronic in nature and but appears worse today. PMHx liver disease, pt's currently under care of renal physician and hematologist. There are no mitigating or exacerbating factors noted.  Pt denies any fever, diarrhea, SOB, HA, dizziness, hemiparesis, cough, blood in stool, abd pain, constipation, and all other sxs at this time. No further complaints or concerns at this time. Last seen by Hepatology, Syd Bradshaw MD - 04/11/2017         Hospital Course:  CXR showed no acute cardiopulmonary disease. CT head with no acute intracranial findings. CT chest/abd with Cirrhotic liver with Innumerable hypodense nodules most likely represent regenerating nodules and moderate ascites. GI consulted.    9/13/17: patient will have paracentesis today. She did get FFP last night and INR 1.9 today. Plan is to give another unit of FFP. GI on case and the question is to what has caused her to decompensate.    Interval History: 50 yo female with PERKINS presented complaining of chest pain which she ruled out for any cardiac issues but has PERKINS cirrhosis now with decompensated.  Review of Systems    Constitutional: Negative.    HENT: Positive for nosebleeds.    Eyes: Negative.    Respiratory: Negative.    Cardiovascular: Negative.    Gastrointestinal: Positive for abdominal distention.   Endocrine: Negative.    Genitourinary: Negative.    Musculoskeletal: Negative.    Psychiatric/Behavioral: Negative.      Objective:     Vital Signs (Most Recent):  Temp: 98.7 °F (37.1 °C) (09/13/17 0953)  Pulse: 100 (09/13/17 0953)  Resp: 20 (09/13/17 0953)  BP: 123/67 (09/13/17 0953)  SpO2: 99 % (09/13/17 0953) Vital Signs (24h Range):  Temp:  [98.1 °F (36.7 °C)-99.4 °F (37.4 °C)] 98.7 °F (37.1 °C)  Pulse:  [] 100  Resp:  [16-20] 20  SpO2:  [98 %-100 %] 99 %  BP: (118-143)/(60-72) 123/67     Weight: 83.3 kg (183 lb 11.2 oz)  Body mass index is 32.54 kg/m².    Intake/Output Summary (Last 24 hours) at 09/13/17 1029  Last data filed at 09/13/17 0700   Gross per 24 hour   Intake              562 ml   Output              500 ml   Net               62 ml      Physical Exam   Constitutional: She is oriented to person, place, and time. She appears well-developed and well-nourished.   HENT:   Head: Normocephalic and atraumatic.   Right Ear: External ear normal.   Left Ear: External ear normal.   Nose: Nose normal.   Mouth/Throat: Oropharynx is clear and moist.   Eyes: EOM are normal. Pupils are equal, round, and reactive to light. Scleral icterus is present.   Neck: Normal range of motion. Neck supple.   Cardiovascular: Normal rate, regular rhythm, normal heart sounds and intact distal pulses.    Pulmonary/Chest: Effort normal and breath sounds normal.   Abdominal: Soft. Bowel sounds are normal. She exhibits distension.   Musculoskeletal: Normal range of motion.   Neurological: She is alert and oriented to person, place, and time.   Skin: Skin is warm and dry.   Psychiatric: She has a normal mood and affect.   Nursing note and vitals reviewed.      Significant Labs:   Recent Lab Results       09/13/17  0505 09/12/17  1544  09/12/17  1205      Albumin 2.1(L)       Alkaline Phosphatase 136(H)       ALT 18       Ammonia 44       Anion Gap 8       Aniso Moderate       AST 62(H)       Baso # 0.02       Basophil% 0.2       Total Bilirubin 10.1  Comment:  For infants and newborns, interpretation of results should be based  on gestational age, weight and in agreement with clinical  observations.  Premature Infant recommended reference ranges:  Up to 24 hours.............<8.0 mg/dL  Up to 48 hours............<12.0 mg/dL  3-5 days..................<15.0 mg/dL  6-29 days.................<15.0 mg/dL  (H)       BUN, Bld 4(L)       Calcium 8.0(L)       Chloride 99       CO2 29       Creatinine 0.6       Differential Method Automated       eGFR if  >60       eGFR if non  >60  Comment:  Calculation used to obtain the estimated glomerular filtration  rate (eGFR) is the CKD-EPI equation. Since race is unknown   in our information system, the eGFR values for   -American and Non--American patients are given   for each creatinine result.         Eos # 0.1       Eosinophil% 1.6       Glucose 90       Gran # 5.0       Gran% 61.1       Hematocrit 24.3(L)       Hemoglobin 8.0(L)       Hypo Occasional       Coumadin Monitoring INR 1.9  Comment:  Coumadin Therapy:  2.0 - 3.0 for INR for all indicators except mechanical heart valves  and antiphospholipid syndromes which should use 2.5 - 3.5.  (H)       Lymph # 2.1       Lymph% 25.4       Magnesium 1.8       MCH 22.0(L)       MCHC 32.9       MCV 67(L)       Mono # 1.0       Mono% 11.7       MPV SEE COMMENT  Comment:  Result not available.       Ovalocytes Occasional       Phosphorus 2.4(L)       Platelets 106(L)       Poik Moderate       Potassium 2.6  Comment:  K  critical result(s) called and verbal readback obtained from Mandy Valero RN, 09/13/2017 07:30  (LL) 2.8(L)      Total Protein 6.5       Protime 19.0(H)       RBC 3.64(L)       RDW 21.3(H)       Sodium 136        Target Cells Moderate       Troponin I   0.008  Comment:  The reference interval for Troponin I represents the 99th percentile   cutoff   for our facility and is consistent with 3rd generation assay   performance.       WBC 8.18             Significant Imaging:   Imaging Results          CT Head Without Contrast (Final result)  Result time 09/11/17 22:58:14    Final result by Pola Chen MD (09/11/17 22:58:14)                 Impression:        No acute intracranial findings.      All CT scans at this facility use dose modulation, iterative reconstruction and/or weight based dosing when appropriate to reduced radiation dose to as low as reasonably achievable.        Electronically signed by: POLA CHEN MD  Date:     09/11/17  Time:    22:58              Narrative:    EXAM:CT HEAD WITHOUT CONTRAST 09/11/17 22:43:31      HISTORY: Altered mental status.  Weakness.  Lethargy.    TECHNIQUE: Standard axial imaging performed with reformatted sagittal and coronal images.       COMPARISON: None      FINDINGS:    Midline structures intact.  Age-appropriate atrophy present with mild prominence of ventricular system and cortical sulci.  Normal differentiation of gray white matter.    Skull base and calvarium are normal.  Visualized paranasal sinuses and mastoid air cells are clear.                             CT Abdomen Pelvis With Contrast (Final result)  Result time 09/11/17 23:04:34    Final result by Pola Chen MD (09/11/17 23:04:34)                 Impression:        Cirrhotic liver.  Innumerable hypodense nodules most likely represent regenerating nodules.    Moderate ascites.    Mild nonspecific circumferential small bowel wall thickening in the mid small bowel left abdomen.  Finding could indicate enteritis.      All CT scans at this facility use dose modulation, iterative reconstruction and/or weight based dosing when appropriate to reduced radiation dose to as low as reasonably  achievable.        Electronically signed by: RHODA CHEN MD  Date:     09/11/17  Time:    23:04              Narrative:    EXAM:CT ABDOMEN PELVIS WITH CONTRAST 09/11/17 22:43:31      HISTORY: Abdominal distention    TECHNIQUE: Standard axial imaging performed with reformatted sagittal and coronal images.  Image is performed with IV contrast.      COMPARISON: None      FINDINGS:    Lung bases clear.    The liver is small in size and nodular with an enlarged caudate lobe.  Findings compatible with cirrhosis.  Diffuse heterogeneity with numerous centimeter hypodense nodules scattered diffusely throughout the liver.  These nodules may represent regenerating nodules.    Portal vein is patent.  Few collateral vessels are identified likely due to portal hypertension.  Moderate ascites present within the abdomen and pelvis.  The spleen is normal in size.    The pancreas is normal.  Adrenal glands are normal.  Aorta and IVC are normal.    Kidneys and ureters are normal.    Small hiatal hernia otherwise the stomach is normal.  Small intestine demonstrates mild circumferential small bowel wall thickening of the left mid abdomen which is nonspecific but could indicate enteritis.  The appendix is normal.  Colon is normal.  The rectum is normal.    Uterus surgically absent.  Adnexa regions are normal.  Bladder collapsed.    No suspicious bony abnormality detected.                             X-Ray Chest AP Portable (Final result)  Result time 09/11/17 20:44:06    Final result by Pam Jean Jr., MD (09/11/17 20:44:06)                 Impression:     No acute cardiopulmonary disease.      Electronically signed by: PAM JEAN  Date:     09/11/17  Time:    20:44              Narrative:    Exam: Portable chest radiograph    History:    Chest pain    Findings:      The lungs are clear. The cardiac silhouette and mediastinum are within normal limits. The remaining osseous structures and soft tissues are within  normal limits.                                Assessment/Plan:      * Chest pain    -obs with tele  -ASA  -trend troponin, first in ER neg  -EKG reviewed.  -Chest Xray reviewed, NAF  -Mg/Phos  -monitor  -consult cards if need    9/12/17: resolved as symptoms are more GI than cardiac  CE are negative            Liver cirrhosis secondary to PERKINS    It appears [facility] but it has been a while  She now presents with what appears to be progression of her symptoms  GI consulted    9/13/17: she is decompensated and no clear etiology as what may have triggered it.  She will get FFP again today to correct INR for paracentesis  GI on case        Ascites    -CT ABD and Pelvis  -Last visit with hepatology 4/2017 with MELD of 15. See Care everywhere notes.   -MELD today 26  -Follow up OP with Hepatologist and consult GI if need pending CT results.   -AST mildly elevated, and increased bilirubin. History of Liver disease   -PT/INR added    9/12/17: she has moderate amount of ascites and could possible benefit from paracentesis  GI consulted    9/13/17: will get FFP so she can get paracentesis  Send fluid to analysis          Hypophosphatemia    Replenish  -monitor            Hypomagnesemia    replenish  -monitor    9/12/17:  repleted as she had a significant amount of vomiting        Weakness of distal arms and legs, of left    -Episodic with CP, resolved. Neuro exam negative on assessment.   -head CT negative  -hypokalemia, hypomagnesemia and hypophosphatemia noted. Corrected Calcium 9.1  -monitor     9/12/17: likely related to electrolyte abnormality.  Continue to replete as symptoms have improved        Thrombocytopenia, unspecified    -stable  -Monitor  -hold pharmacological VTE mitigation    9/13/17: secondary to cirrhosis        Jaundice    -OP follow up, consult GI if need.   -see abd distension    9/12/17: elevated bilirubin  GI consulted        Hypokalemia    -Replete   -control nausea.    -monitor            Anemia    -Patient with hx of iron deficiency anemia receives iron infusions per patient.   -Stable  -monitor  -Patient to continue OP treatment with Briana Bishop MD, next appointment 9/15/17.     9/13/17: H/H stable will transfuse PRN          VTE Risk Mitigation         Ordered     Low Risk of VTE  Once      09/11/17 6340              Crystal Shields MD  Department of Hospital Medicine   [facility]

## 2017-09-13 NOTE — PROGRESS NOTES
[facility] Hospital Medicine  Progress Note    Patient Name: Marcie Bazan  MRN: [id number]  Patient Class: IP- Inpatient   Admission Date: 9/11/2017  Length of Stay: 1 days  Attending Physician: Crystal Shields MD  Primary Care Provider: ODILIA Wall        Subjective:     Principal Problem:Chest pain    HPI:  Marcie Bazan is a 51 y.o. female patient with PMHx  Including HTN and liver disease who presents to the Emergency Department from [facility] urgent care via EMS for chest pain which onset gradually earlier today. The patient was given Nitro in route with resolution to pain. The patient currently denies CP at this time. Her CP is intermittent and moderate in severity. Associated symptoms include reports nausea, emesis, palpitations and an episode of left side weakness. Pt reports abd distention that is chronic in nature and but appears worse today. PMHx liver disease, pt's currently under care of renal physician and hematologist. There are no mitigating or exacerbating factors noted.  Pt denies any fever, diarrhea, SOB, HA, dizziness, hemiparesis, cough, blood in stool, abd pain, constipation, and all other sxs at this time. No further complaints or concerns at this time. Last seen by Hepatology, Syd Bradshaw MD - 04/11/2017         Hospital Course:  CXR showed no acute cardiopulmonary disease. CT head with no acute intracranial findings. CT chest/abd with Cirrhotic liver with Innumerable hypodense nodules most likely represent regenerating nodules and moderate ascites. GI consulted.    9/13/17: patient will have paracentesis today. She did get FFP last night and INR 1.9 today. Plan is to give another unit of FFP. GI on case and the question is to what has caused her to decompensate.    No new subjective & objective note has been filed under this hospital service since the last note was generated.    Assessment/Plan:      * Chest pain    -obs with tele  -ASA  -trend troponin,  first in ER neg  -EKG reviewed.  -Chest Xray reviewed, NAF  -Mg/Phos  -monitor  -consult cards if need    9/12/17: resolved as symptoms are more GI than cardiac  CE are negative            Liver cirrhosis secondary to PERKINS    It appears she has been seen at [facility] hepatology clinic but it has been a while  She now presents with what appears to be progression of her symptoms  GI consulted    9/13/17: she is decompensated and no clear etiology as what may have triggered it.  She will get FFP again today to correct INR for paracentesis  GI on case        Ascites    -CT ABD and Pelvis  -Last visit with hepatology 4/2017 with MELD of 15. See Care everywhere notes.   -MELD today 26  -Follow up OP with Hepatologist and consult GI if need pending CT results.   -AST mildly elevated, and increased bilirubin. History of Liver disease   -PT/INR added    9/12/17: she has moderate amount of ascites and could possible benefit from paracentesis  GI consulted    9/13/17: will get FFP so she can get paracentesis  Send fluid to analysis          Hypophosphatemia    Replenish  -monitor            Hypomagnesemia    replenish  -monitor    9/12/17:  repleted as she had a significant amount of vomiting        Weakness of distal arms and legs, of left    -Episodic with CP, resolved. Neuro exam negative on assessment.   -head CT negative  -hypokalemia, hypomagnesemia and hypophosphatemia noted. Corrected Calcium 9.1  -monitor     9/12/17: likely related to electrolyte abnormality.  Continue to replete as symptoms have improved        Thrombocytopenia, unspecified    -stable  -Monitor  -hold pharmacological VTE mitigation    9/13/17: secondary to cirrhosis        Jaundice    -OP follow up, consult GI if need.   -see abd distension    9/12/17: elevated bilirubin  GI consulted        Hypokalemia    -Replete   -control nausea.   -monitor            Anemia    -Patient with hx of iron deficiency anemia receives iron infusions per patient.    -Stable  -monitor  -Patient to continue OP treatment with Briana Bishop MD, next appointment 9/15/17.     9/13/17: H/H stable will transfuse PRN          VTE Risk Mitigation         Ordered     Low Risk of VTE  Once      09/11/17 5168              Crystal Shields MD  Department of Hospital Medicine   [facility]

## 2017-09-14 PROBLEM — E83.42 HYPOMAGNESEMIA: Status: RESOLVED | Noted: 2017-09-11 | Resolved: 2017-09-14

## 2017-09-14 PROBLEM — R07.9 CHEST PAIN: Status: RESOLVED | Noted: 2017-09-11 | Resolved: 2017-09-14

## 2017-09-14 PROBLEM — E83.42 HYPOMAGNESEMIA: Status: RESOLVED | Noted: 2017-09-14 | Resolved: 2017-09-14

## 2017-09-14 LAB
ALBUMIN SERPL BCP-MCNC: 2.3 G/DL
ALP SERPL-CCNC: 138 U/L
ALT SERPL W/O P-5'-P-CCNC: 19 U/L
ANION GAP SERPL CALC-SCNC: 5 MMOL/L
ANISOCYTOSIS BLD QL SMEAR: ABNORMAL
AST SERPL-CCNC: 56 U/L
BASOPHILS # BLD AUTO: 0.02 K/UL
BASOPHILS NFR BLD: 0.3 %
BILIRUB SERPL-MCNC: 10.1 MG/DL
BUN SERPL-MCNC: 5 MG/DL
CALCIUM SERPL-MCNC: 8.1 MG/DL
CHLORIDE SERPL-SCNC: 98 MMOL/L
CO2 SERPL-SCNC: 31 MMOL/L
CREAT SERPL-MCNC: 0.6 MG/DL
DIFFERENTIAL METHOD: ABNORMAL
EOSINOPHIL # BLD AUTO: 0.1 K/UL
EOSINOPHIL NFR BLD: 1.7 %
ERYTHROCYTE [DISTWIDTH] IN BLOOD BY AUTOMATED COUNT: 21.8 %
EST. GFR  (AFRICAN AMERICAN): >60 ML/MIN/1.73 M^2
EST. GFR  (NON AFRICAN AMERICAN): >60 ML/MIN/1.73 M^2
GLUCOSE SERPL-MCNC: 91 MG/DL
HCT VFR BLD AUTO: 24.4 %
HGB BLD-MCNC: 8 G/DL
HYPOCHROMIA BLD QL SMEAR: ABNORMAL
INR PPP: 1.8
LYMPHOCYTES # BLD AUTO: 1.6 K/UL
LYMPHOCYTES NFR BLD: 26.9 %
MAGNESIUM SERPL-MCNC: 1.8 MG/DL
MCH RBC QN AUTO: 22.1 PG
MCHC RBC AUTO-ENTMCNC: 32.8 G/DL
MCV RBC AUTO: 67 FL
MONOCYTES # BLD AUTO: 0.7 K/UL
MONOCYTES NFR BLD: 11.7 %
NEUTROPHILS # BLD AUTO: 3.5 K/UL
NEUTROPHILS NFR BLD: 59.4 %
PATH INTERP FLD-IMP: NORMAL
PHOSPHATE SERPL-MCNC: 2.4 MG/DL
PLATELET # BLD AUTO: 86 K/UL
PMV BLD AUTO: ABNORMAL FL
POIKILOCYTOSIS BLD QL SMEAR: ABNORMAL
POTASSIUM SERPL-SCNC: 2.7 MMOL/L
POTASSIUM SERPL-SCNC: 3.3 MMOL/L
PROT SERPL-MCNC: 6.4 G/DL
PROTHROMBIN TIME: 17.8 SEC
RBC # BLD AUTO: 3.62 M/UL
SODIUM SERPL-SCNC: 134 MMOL/L
TARGETS BLD QL SMEAR: ABNORMAL
WBC # BLD AUTO: 5.81 K/UL

## 2017-09-14 PROCEDURE — 85025 COMPLETE CBC W/AUTO DIFF WBC: CPT

## 2017-09-14 PROCEDURE — 83735 ASSAY OF MAGNESIUM: CPT

## 2017-09-14 PROCEDURE — 63600175 PHARM REV CODE 636 W HCPCS: Performed by: NURSE PRACTITIONER

## 2017-09-14 PROCEDURE — 84100 ASSAY OF PHOSPHORUS: CPT

## 2017-09-14 PROCEDURE — 25000003 PHARM REV CODE 250: Performed by: NURSE PRACTITIONER

## 2017-09-14 PROCEDURE — 85610 PROTHROMBIN TIME: CPT

## 2017-09-14 PROCEDURE — 99232 SBSQ HOSP IP/OBS MODERATE 35: CPT | Mod: ,,, | Performed by: PHYSICIAN ASSISTANT

## 2017-09-14 PROCEDURE — 36415 COLL VENOUS BLD VENIPUNCTURE: CPT

## 2017-09-14 PROCEDURE — 25000003 PHARM REV CODE 250: Performed by: PHYSICIAN ASSISTANT

## 2017-09-14 PROCEDURE — 80053 COMPREHEN METABOLIC PANEL: CPT

## 2017-09-14 PROCEDURE — 84132 ASSAY OF SERUM POTASSIUM: CPT

## 2017-09-14 PROCEDURE — 21400001 HC TELEMETRY ROOM

## 2017-09-14 PROCEDURE — 25000003 PHARM REV CODE 250: Performed by: INTERNAL MEDICINE

## 2017-09-14 RX ORDER — SPIRONOLACTONE 25 MG/1
50 TABLET ORAL DAILY
Status: DISCONTINUED | OUTPATIENT
Start: 2017-09-14 | End: 2017-09-15 | Stop reason: HOSPADM

## 2017-09-14 RX ORDER — POLYETHYLENE GLYCOL 3350 17 G/17G
17 POWDER, FOR SOLUTION ORAL DAILY
Status: DISCONTINUED | OUTPATIENT
Start: 2017-09-14 | End: 2017-09-15 | Stop reason: HOSPADM

## 2017-09-14 RX ORDER — POTASSIUM CHLORIDE 20 MEQ/15ML
40 SOLUTION ORAL DAILY
Status: DISCONTINUED | OUTPATIENT
Start: 2017-09-14 | End: 2017-09-15

## 2017-09-14 RX ORDER — POTASSIUM CHLORIDE 20 MEQ/1
40 TABLET, EXTENDED RELEASE ORAL ONCE
Status: COMPLETED | OUTPATIENT
Start: 2017-09-14 | End: 2017-09-14

## 2017-09-14 RX ORDER — DOCUSATE SODIUM 100 MG/1
100 CAPSULE, LIQUID FILLED ORAL DAILY
Status: DISCONTINUED | OUTPATIENT
Start: 2017-09-14 | End: 2017-09-15 | Stop reason: HOSPADM

## 2017-09-14 RX ORDER — BISACODYL 5 MG
20 TABLET, DELAYED RELEASE (ENTERIC COATED) ORAL ONCE
Status: COMPLETED | OUTPATIENT
Start: 2017-09-14 | End: 2017-09-14

## 2017-09-14 RX ORDER — POTASSIUM CHLORIDE 7.45 MG/ML
10 INJECTION INTRAVENOUS ONCE
Status: COMPLETED | OUTPATIENT
Start: 2017-09-14 | End: 2017-09-14

## 2017-09-14 RX ADMIN — ONDANSETRON 4 MG: 2 INJECTION INTRAMUSCULAR; INTRAVENOUS at 12:09

## 2017-09-14 RX ADMIN — POTASSIUM CHLORIDE 40 MEQ: 1500 TABLET, EXTENDED RELEASE ORAL at 08:09

## 2017-09-14 RX ADMIN — SPIRONOLACTONE 50 MG: 25 TABLET ORAL at 02:09

## 2017-09-14 RX ADMIN — DOCUSATE SODIUM 100 MG: 100 CAPSULE, LIQUID FILLED ORAL at 12:09

## 2017-09-14 RX ADMIN — LISINOPRIL 5 MG: 5 TABLET ORAL at 08:09

## 2017-09-14 RX ADMIN — POTASSIUM CHLORIDE 40 MEQ: 20 SOLUTION ORAL at 02:09

## 2017-09-14 RX ADMIN — POTASSIUM CHLORIDE 10 MEQ: 7.46 INJECTION, SOLUTION INTRAVENOUS at 02:09

## 2017-09-14 RX ADMIN — BISACODYL 20 MG: 5 TABLET, COATED ORAL at 05:09

## 2017-09-14 RX ADMIN — PANTOPRAZOLE SODIUM 40 MG: 40 TABLET, DELAYED RELEASE ORAL at 08:09

## 2017-09-14 RX ADMIN — ONDANSETRON 4 MG: 2 INJECTION INTRAMUSCULAR; INTRAVENOUS at 11:09

## 2017-09-14 NOTE — ASSESSMENT & PLAN NOTE
50 yo female with acute decompensation liver cirrhosis likely from disease progression.   No evidence of SBP.   Recommend starting Aldactone 50 mg today. Will not start Lasix yet since issues with hypokalemia.   Monitor MELD labs daily. In April, it was documented as 15.     MELD-Na score: 24 at 9/14/2017  5:00 AM  MELD score: 22 at 9/14/2017  5:00 AM  Calculated from:  Serum Creatinine: 0.6 mg/dL (Rounded to 1) at 9/14/2017  5:00 AM  Serum Sodium: 134 mmol/L at 9/14/2017  5:00 AM  Total Bilirubin: 10.1 mg/dL at 9/14/2017  5:00 AM  INR(ratio): 1.8 at 9/14/2017  5:00 AM  Age: 51 years

## 2017-09-14 NOTE — PROGRESS NOTES
[facility] Hospital Medicine  Progress Note    Patient Name: Marcie Bazan  MRN: [id number]  Patient Class: IP- Inpatient   Admission Date: 9/11/2017  Length of Stay: 2 days  Attending Physician: Filemon Sorensen MD  Primary Care Provider: ODILIA Wall    Subjective:     Principal Problem:Chest pain    HPI:  Marcie Bazan is a 51 y.o. female patient with PMHx  Including HTN and liver disease who presents to the Emergency Department from [facility] urgent care via EMS for chest pain which onset gradually earlier today. The patient was given Nitro in route with resolution to pain. The patient currently denies CP at this time. Her CP is intermittent and moderate in severity. Associated symptoms include reports nausea, emesis, palpitations and an episode of left side weakness. Pt reports abd distention that is chronic in nature and but appears worse today. PMHx liver disease, pt's currently under care of renal physician and hematologist. There are no mitigating or exacerbating factors noted.  Pt denies any fever, diarrhea, SOB, HA, dizziness, hemiparesis, cough, blood in stool, abd pain, constipation, and all other sxs at this time. No further complaints or concerns at this time. Last seen by Hepatology, Syd Bradshaw MD - 04/11/2017         Hospital Course:  CXR showed no acute cardiopulmonary disease. CT head with no acute intracranial findings. CT chest/abd with Cirrhotic liver with Innumerable hypodense nodules most likely represent regenerating nodules and moderate ascites. GI consulted.    9/13/17: patient will have paracentesis today. She did get FFP last night and INR 1.9 today. Plan is to give another unit of FFP. GI on case and the question is to what has caused her to decompensate.    9/14/17:paracentesis completed 9/13/17,  2.5L removed. Ascitic analysis unremarkable. Complains of abdominal fullness today. Aldactone 50mg started. Potassium remains low.     Interval History:  Paracentesis completed 9/13/17,  2.5L removed. Ascitic analysis unremarkable. Complains of abdominal fullness today. Aldactone 50mg started. Potassium remains low.     Review of Systems   Constitutional: Positive for appetite change and fatigue.   Respiratory: Negative for chest tightness and shortness of breath.    Gastrointestinal: Positive for abdominal distention, abdominal pain and nausea.      Objective:     Vital Signs (Most Recent):  Temp: 98.8 °F (37.1 °C) (09/14/17 1318)  Pulse: 92 (09/14/17 1318)  Resp: 18 (09/14/17 1318)  BP: 130/64 (09/14/17 1318)  SpO2: 100 % (09/14/17 1318) Vital Signs (24h Range):  Temp:  [97.8 °F (36.6 °C)-99.3 °F (37.4 °C)] 98.8 °F (37.1 °C)  Pulse:  [88-92] 92  Resp:  [17-18] 18  SpO2:  [95 %-100 %] 100 %  BP: (113-130)/(58-66) 130/64     Weight: 78.9 kg (173 lb 14.4 oz)  Body mass index is 30.81 kg/m².    Intake/Output Summary (Last 24 hours) at 09/14/17 1417  Last data filed at 09/14/17 0800   Gross per 24 hour   Intake              240 ml   Output              600 ml   Net             -360 ml      Physical Exam   Constitutional: She is oriented to person, place, and time. She appears well-developed and well-nourished.   HENT:   Head: Normocephalic and atraumatic.   Right Ear: External ear normal.   Left Ear: External ear normal.   Nose: Nose normal.   Mouth/Throat: Oropharynx is clear and moist.   Eyes: EOM are normal. Pupils are equal, round, and reactive to light. Scleral icterus is present.   Neck: Normal range of motion. Neck supple.   Cardiovascular: Normal rate, regular rhythm, normal heart sounds and intact distal pulses.    Pulmonary/Chest: Effort normal and breath sounds normal.   Abdominal: Soft. Bowel sounds are normal. She exhibits distension.   Musculoskeletal: Normal range of motion.   Neurological: She is alert and oriented to person, place, and time.   Skin: Skin is warm and dry.   Psychiatric: She has a normal mood and affect.   Nursing note and vitals  reviewed.    Significant Labs:   CBC:   Recent Labs  Lab 09/13/17  0505 09/14/17  0500   WBC 8.18 5.81   HGB 8.0* 8.0*   HCT 24.3* 24.4*   * 86*     CMP:   Recent Labs  Lab 09/13/17  0505 09/13/17  1120 09/14/17  0500    135* 134*   K 2.6* 2.6* 2.7*   CL 99 97 98   CO2 29 29 31*   GLU 90 103 91   BUN 4* 5* 5*   CREATININE 0.6 0.6 0.6   CALCIUM 8.0* 8.2* 8.1*   PROT 6.5 6.8 6.4   ALBUMIN 2.1* 2.3* 2.3*   BILITOT 10.1* 10.7* 10.1*   ALKPHOS 136* 133 138*   AST 62* 63* 56*   ALT 18 19 19   ANIONGAP 8 9 5*   EGFRNONAA >60 >60 >60       Significant Imaging:   Imaging Results          X-Ray Abdomen Flat And Erect (Final result)  Result time 09/14/17 12:46:43    Final result by JUAN Longo Sr., MD (09/14/17 12:46:43)                 Impression:      1. The bowel gas pattern is normal in appearance.   2. There are surgical clips projected over the right upper quadrant of the abdomen. This is characteristic of a prior cholecystectomy.  3. There are 4 lumbar-type vertebral bodies. This is characteristic of an incidental finding.      Electronically signed by: JUAN LONGO MD  Date:     09/14/17  Time:    12:46              Narrative:    Flat and erect KUB    History: Abdominal pain    Finding: Comparison was made to a prior examination performed on 5/8/2017. The bowel gas pattern is normal in appearance. There are surgical clips projected over the right upper quadrant of the abdomen. There is no pneumoperitoneum. There are 4 lumbar-type vertebral bodies.                             US Guided Paracentesis (Final result)  Result time 09/13/17 11:41:38    Final result by Therese Edwards MD (09/13/17 11:41:38)                 Impression:         Successful ultrasound-guided paracentesis as described above.          Electronically signed by: THERESE EDWARDS MD  Date:     09/13/17  Time:    11:41              Narrative:    Ultrasound-guided paracentesis, Wednesday, September 13, 2017    History:    ascites.    The procedure was explained in detail and informed consent. Time out was performed. Utilizing sterile technique and cutaneous anesthesia a 5 Georgian catheter was inserted into the left lower quadrant with the assistance of Sushant Jose. Approximately 2.5 liters of light cale fluid was removed. The patient tolerated the procedure well. No complications were encountered. If requested the fluid was delivered to the pathology department for assessment.                             CT Head Without Contrast (Final result)  Result time 09/11/17 22:58:14    Final result by Pola Clay MD (09/11/17 22:58:14)                 Impression:        No acute intracranial findings.      All CT scans at this facility use dose modulation, iterative reconstruction and/or weight based dosing when appropriate to reduced radiation dose to as low as reasonably achievable.        Electronically signed by: POLA CLAY MD  Date:     09/11/17  Time:    22:58              Narrative:    EXAM:CT HEAD WITHOUT CONTRAST 09/11/17 22:43:31      HISTORY: Altered mental status.  Weakness.  Lethargy.    TECHNIQUE: Standard axial imaging performed with reformatted sagittal and coronal images.       COMPARISON: None      FINDINGS:    Midline structures intact.  Age-appropriate atrophy present with mild prominence of ventricular system and cortical sulci.  Normal differentiation of gray white matter.    Skull base and calvarium are normal.  Visualized paranasal sinuses and mastoid air cells are clear.                             CT Abdomen Pelvis With Contrast (Final result)  Result time 09/11/17 23:04:34    Final result by Pola Clay MD (09/11/17 23:04:34)                 Impression:        Cirrhotic liver.  Innumerable hypodense nodules most likely represent regenerating nodules.    Moderate ascites.    Mild nonspecific circumferential small bowel wall thickening in the mid small bowel left abdomen.   Finding could indicate enteritis.      All CT scans at this facility use dose modulation, iterative reconstruction and/or weight based dosing when appropriate to reduced radiation dose to as low as reasonably achievable.        Electronically signed by: RHODA CHEN MD  Date:     09/11/17  Time:    23:04              Narrative:    EXAM:CT ABDOMEN PELVIS WITH CONTRAST 09/11/17 22:43:31      HISTORY: Abdominal distention    TECHNIQUE: Standard axial imaging performed with reformatted sagittal and coronal images.  Image is performed with IV contrast.      COMPARISON: None      FINDINGS:    Lung bases clear.    The liver is small in size and nodular with an enlarged caudate lobe.  Findings compatible with cirrhosis.  Diffuse heterogeneity with numerous centimeter hypodense nodules scattered diffusely throughout the liver.  These nodules may represent regenerating nodules.    Portal vein is patent.  Few collateral vessels are identified likely due to portal hypertension.  Moderate ascites present within the abdomen and pelvis.  The spleen is normal in size.    The pancreas is normal.  Adrenal glands are normal.  Aorta and IVC are normal.    Kidneys and ureters are normal.    Small hiatal hernia otherwise the stomach is normal.  Small intestine demonstrates mild circumferential small bowel wall thickening of the left mid abdomen which is nonspecific but could indicate enteritis.  The appendix is normal.  Colon is normal.  The rectum is normal.    Uterus surgically absent.  Adnexa regions are normal.  Bladder collapsed.    No suspicious bony abnormality detected.                             X-Ray Chest AP Portable (Final result)  Result time 09/11/17 20:44:06    Final result by Pam Jean Jr., MD (09/11/17 20:44:06)                 Impression:     No acute cardiopulmonary disease.      Electronically signed by: PAM JEAN  Date:     09/11/17  Time:    20:44              Narrative:    Exam: Portable  chest radiograph    History:    Chest pain    Findings:      The lungs are clear. The cardiac silhouette and mediastinum are within normal limits. The remaining osseous structures and soft tissues are within normal limits.                                Assessment/Plan:      Coagulopathy    R/T Cirrhosis          Hypophosphatemia    Replenish  -monitor            Weakness of distal arms and legs, of left    -Episodic with CP, resolved. Neuro exam negative on assessment.   -head CT negative  -hypokalemia, hypomagnesemia and hypophosphatemia noted. Corrected Calcium 9.1  -monitor     9/12/17: likely related to electrolyte abnormality.  Continue to replete as symptoms have improved        Ascites    -Related to Cirrhosis  -s/p Paracentesis 9/13/17 removing 2.5L of fluid. Analysis unremarkable  -GI following. Will Start Aldactone 9/14/17          Thrombocytopenia, unspecified    -stable           Jaundice    -r/t Cirrhosis        Hypokalemia    -she been unable to tolerate oral tablets. Reports vomiting them up shortly after. Will try liquid today and monitor patient's tolerance. Will give IV as well. Repeat at 1800.  -Aldactone has been started and will help          Liver cirrhosis secondary to PERKINS    It appears she has been seen at [facility] but it has been a while  She now presents with what appears to be progression of her symptoms  GI consulted    9/13/17: she is decompensated and no clear etiology as what may have triggered it.  She will get FFP again today to correct INR for paracentesis  GI on case    9/14/17  Plans to start Aldactone today          Anemia    -Patient with hx of iron deficiency anemia receives iron infusions per patient.   -Stable  -monitor   -Follows Briana Bishop MD              VTE Risk Mitigation         Ordered     Low Risk of VTE  Once      09/11/17 2136          Grace Carrasquillo NP  Department of Hospital Medicine   [facility]

## 2017-09-14 NOTE — PROGRESS NOTES
[facility]  Gastroenterology  Progress Note    Patient Name: Marcie Bazan  MRN: [id number]  Admission Date: 9/11/2017  Hospital Length of Stay: 2 days  Code Status: Full Code   Attending Provider: Filemon Sorensen MD  Consulting Provider: Nabil Acosta PA-C  Primary Care Physician: ODILIA Wall  Principal Problem: Chest pain      Subjective:     Interval History:   The patient says she feels like the swelling is coming back in her abdomen. Cell count not consistent with SBP. SAAG consistent with portal hypertension. She hasn't eaten well today. She reported vomiting with oral potassium. She is now getting IV. She hasn't had a good BM while here.     Review of Systems   Constitutional:        See Interval History for daily ROS.      Objective:     Vital Signs (Most Recent):  Temp: 98.8 °F (37.1 °C) (09/14/17 1318)  Pulse: 92 (09/14/17 1318)  Resp: 18 (09/14/17 1318)  BP: 130/64 (09/14/17 1318)  SpO2: 100 % (09/14/17 1318) Vital Signs (24h Range):  Temp:  [97.8 °F (36.6 °C)-99.3 °F (37.4 °C)] 98.8 °F (37.1 °C)  Pulse:  [88-92] 92  Resp:  [17-18] 18  SpO2:  [95 %-100 %] 100 %  BP: (113-130)/(58-66) 130/64     Weight: 78.9 kg (173 lb 14.4 oz) (09/14/17 0348)  Body mass index is 30.81 kg/m².      Intake/Output Summary (Last 24 hours) at 09/14/17 1548  Last data filed at 09/14/17 0800   Gross per 24 hour   Intake              240 ml   Output              600 ml   Net             -360 ml       Lines/Drains/Airways     Peripheral Intravenous Line                 Peripheral IV - Single Lumen 09/12/17 0129 Left Forearm 2 days                Physical Exam   Constitutional: She is oriented to person, place, and time. She appears well-developed and well-nourished.   HENT:   Head: Normocephalic and atraumatic.   Eyes: Scleral icterus is present.   Cardiovascular: Normal rate and regular rhythm.    Pulmonary/Chest: Effort normal and breath sounds normal.   Abdominal: Soft. Bowel sounds are  normal. She exhibits distension. There is no tenderness.   Neurological: She is alert and oriented to person, place, and time.   Psychiatric: Her behavior is normal.       Significant Labs:  CBC:   Recent Labs  Lab 09/13/17  0505 09/14/17  0500   WBC 8.18 5.81   HGB 8.0* 8.0*   HCT 24.3* 24.4*   * 86*     CMP:   Recent Labs  Lab 09/14/17  0500   GLU 91   CALCIUM 8.1*   ALBUMIN 2.3*   PROT 6.4   *   K 2.7*   CO2 31*   CL 98   BUN 5*   CREATININE 0.6   ALKPHOS 138*   ALT 19   AST 56*   BILITOT 10.1*     Coagulation:   Recent Labs  Lab 09/14/17  0500   INR 1.8*         Significant Imaging:  Imaging results within the past 24 hours have been reviewed.    Assessment/Plan:     Liver cirrhosis secondary to PERKINS    52 yo female with acute decompensation liver cirrhosis likely from disease progression.   No evidence of SBP.   Recommend starting Aldactone 50 mg today. Will not start Lasix yet since issues with hypokalemia.   Monitor MELD labs daily. In April, it was documented as 15.     MELD-Na score: 24 at 9/14/2017  5:00 AM  MELD score: 22 at 9/14/2017  5:00 AM  Calculated from:  Serum Creatinine: 0.6 mg/dL (Rounded to 1) at 9/14/2017  5:00 AM  Serum Sodium: 134 mmol/L at 9/14/2017  5:00 AM  Total Bilirubin: 10.1 mg/dL at 9/14/2017  5:00 AM  INR(ratio): 1.8 at 9/14/2017  5:00 AM  Age: 51 years          Coagulopathy    Due to decompensated cirrhosis. Stable.        Ascites    Start Aldactone. See plan above.   Case dicussed with Hospital Medicine service.           Thrombocytopenia, unspecified    Secondary to cirrhosis.   No overt bleeding.         Hypokalemia    IV replacement started today due to intolerance of oral K.   Monitor        Anemia    Chronic, stable during admission. Follows with Hematology as an outpatient.   No overt bleeding.           Recommend Dulcolax for constipation. Not sure if this is playing a role in her N/V. KUB was otherwise unremarkable.     Thank you for your consult. I will  follow-up with patient. Please contact us if you have any additional questions.    Nabil Acosta PA-C  Gastroenterology  [facility]

## 2017-09-14 NOTE — PLAN OF CARE
Problem: Patient Care Overview  Goal: Plan of Care Review  Outcome: Ongoing (interventions implemented as appropriate)  Plan of care reviewed with patient, verbalized understanding. Pt remained free from falls, fall precautions in place. Pt is NSR on monitor, 75-87. Vital signs stable. No other c/o at this time. Call bell and personal belongings within reach. Reminded to call for assistance.

## 2017-09-14 NOTE — ASSESSMENT & PLAN NOTE
-Related to Cirrhosis  -s/p Paracentesis 9/13/17 removing 2.5L of fluid. Analysis unremarkable  -GI following. Will Start Aldactone 9/14/17

## 2017-09-14 NOTE — ASSESSMENT & PLAN NOTE
It appears she has been seen at [facility] hepatology clinic but it has been a while  She now presents with what appears to be progression of her symptoms  GI consulted    9/13/17: she is decompensated and no clear etiology as what may have triggered it.  She will get FFP again today to correct INR for paracentesis  GI on case    9/14/17  Plans to start Aldactone today

## 2017-09-14 NOTE — PLAN OF CARE
Problem: Patient Care Overview  Goal: Plan of Care Review  Outcome: Outcome(s) achieved Date Met: 09/14/17  Pt has been free of falls. PIV intact. Iv potassium and oral potassium given. SR on the monitor. Pt is on RA. Pt has c/o pain. Aldactone started due to rounded stomach. Call light in reach and hourly rounding made.

## 2017-09-14 NOTE — ASSESSMENT & PLAN NOTE
-she been unable to tolerate oral tablets. Reports vomiting them up shortly after. Will try liquid today and monitor patient's tolerance. Will give IV as well. Repeat at 1800.  -Aldactone has been started and will help

## 2017-09-14 NOTE — ASSESSMENT & PLAN NOTE
-Patient with hx of iron deficiency anemia receives iron infusions per patient.   -Stable  -monitor   -Follows Briana Bishop MD

## 2017-09-14 NOTE — SUBJECTIVE & OBJECTIVE
Interval History: Paracentesis completed 9/13/17,  2.5L removed. Ascitic analysis unremarkable. Complains of abdominal fullness today. Aldactone 50mg started. Potassium remains low.     Review of Systems   Constitutional: Positive for appetite change and fatigue.   Respiratory: Negative for chest tightness and shortness of breath.    Gastrointestinal: Positive for abdominal distention, abdominal pain and nausea.      Objective:     Vital Signs (Most Recent):  Temp: 98.8 °F (37.1 °C) (09/14/17 1318)  Pulse: 92 (09/14/17 1318)  Resp: 18 (09/14/17 1318)  BP: 130/64 (09/14/17 1318)  SpO2: 100 % (09/14/17 1318) Vital Signs (24h Range):  Temp:  [97.8 °F (36.6 °C)-99.3 °F (37.4 °C)] 98.8 °F (37.1 °C)  Pulse:  [88-92] 92  Resp:  [17-18] 18  SpO2:  [95 %-100 %] 100 %  BP: (113-130)/(58-66) 130/64     Weight: 78.9 kg (173 lb 14.4 oz)  Body mass index is 30.81 kg/m².    Intake/Output Summary (Last 24 hours) at 09/14/17 1417  Last data filed at 09/14/17 0800   Gross per 24 hour   Intake              240 ml   Output              600 ml   Net             -360 ml      Physical Exam   Constitutional: She is oriented to person, place, and time. She appears well-developed and well-nourished.   HENT:   Head: Normocephalic and atraumatic.   Right Ear: External ear normal.   Left Ear: External ear normal.   Nose: Nose normal.   Mouth/Throat: Oropharynx is clear and moist.   Eyes: EOM are normal. Pupils are equal, round, and reactive to light. Scleral icterus is present.   Neck: Normal range of motion. Neck supple.   Cardiovascular: Normal rate, regular rhythm, normal heart sounds and intact distal pulses.    Pulmonary/Chest: Effort normal and breath sounds normal.   Abdominal: Soft. Bowel sounds are normal. She exhibits distension.   Musculoskeletal: Normal range of motion.   Neurological: She is alert and oriented to person, place, and time.   Skin: Skin is warm and dry.   Psychiatric: She has a normal mood and affect.   Nursing note  and vitals reviewed.    Significant Labs:   CBC:   Recent Labs  Lab 09/13/17  0505 09/14/17  0500   WBC 8.18 5.81   HGB 8.0* 8.0*   HCT 24.3* 24.4*   * 86*     CMP:   Recent Labs  Lab 09/13/17  0505 09/13/17  1120 09/14/17  0500    135* 134*   K 2.6* 2.6* 2.7*   CL 99 97 98   CO2 29 29 31*   GLU 90 103 91   BUN 4* 5* 5*   CREATININE 0.6 0.6 0.6   CALCIUM 8.0* 8.2* 8.1*   PROT 6.5 6.8 6.4   ALBUMIN 2.1* 2.3* 2.3*   BILITOT 10.1* 10.7* 10.1*   ALKPHOS 136* 133 138*   AST 62* 63* 56*   ALT 18 19 19   ANIONGAP 8 9 5*   EGFRNONAA >60 >60 >60       Significant Imaging:   Imaging Results          X-Ray Abdomen Flat And Erect (Final result)  Result time 09/14/17 12:46:43    Final result by JUAN Longo Sr., MD (09/14/17 12:46:43)                 Impression:      1. The bowel gas pattern is normal in appearance.   2. There are surgical clips projected over the right upper quadrant of the abdomen. This is characteristic of a prior cholecystectomy.  3. There are 4 lumbar-type vertebral bodies. This is characteristic of an incidental finding.      Electronically signed by: JUAN LONGO MD  Date:     09/14/17  Time:    12:46              Narrative:    Flat and erect KUB    History: Abdominal pain    Finding: Comparison was made to a prior examination performed on 5/8/2017. The bowel gas pattern is normal in appearance. There are surgical clips projected over the right upper quadrant of the abdomen. There is no pneumoperitoneum. There are 4 lumbar-type vertebral bodies.                             US Guided Paracentesis (Final result)  Result time 09/13/17 11:41:38    Final result by Therese Edwards MD (09/13/17 11:41:38)                 Impression:         Successful ultrasound-guided paracentesis as described above.          Electronically signed by: THERESE EDWARDS MD  Date:     09/13/17  Time:    11:41              Narrative:    Ultrasound-guided paracentesis, Wednesday, September 13, 2017    History:    ascites.    The procedure was explained in detail and informed consent. Time out was performed. Utilizing sterile technique and cutaneous anesthesia a 5 Romanian catheter was inserted into the left lower quadrant with the assistance of Sushant Jose. Approximately 2.5 liters of light cale fluid was removed. The patient tolerated the procedure well. No complications were encountered. If requested the fluid was delivered to the pathology department for assessment.                             CT Head Without Contrast (Final result)  Result time 09/11/17 22:58:14    Final result by Pola Clay MD (09/11/17 22:58:14)                 Impression:        No acute intracranial findings.      All CT scans at this facility use dose modulation, iterative reconstruction and/or weight based dosing when appropriate to reduced radiation dose to as low as reasonably achievable.        Electronically signed by: POLA CLAY MD  Date:     09/11/17  Time:    22:58              Narrative:    EXAM:CT HEAD WITHOUT CONTRAST 09/11/17 22:43:31      HISTORY: Altered mental status.  Weakness.  Lethargy.    TECHNIQUE: Standard axial imaging performed with reformatted sagittal and coronal images.       COMPARISON: None      FINDINGS:    Midline structures intact.  Age-appropriate atrophy present with mild prominence of ventricular system and cortical sulci.  Normal differentiation of gray white matter.    Skull base and calvarium are normal.  Visualized paranasal sinuses and mastoid air cells are clear.                             CT Abdomen Pelvis With Contrast (Final result)  Result time 09/11/17 23:04:34    Final result by Pola Clay MD (09/11/17 23:04:34)                 Impression:        Cirrhotic liver.  Innumerable hypodense nodules most likely represent regenerating nodules.    Moderate ascites.    Mild nonspecific circumferential small bowel wall thickening in the mid small bowel left abdomen.   Finding could indicate enteritis.      All CT scans at this facility use dose modulation, iterative reconstruction and/or weight based dosing when appropriate to reduced radiation dose to as low as reasonably achievable.        Electronically signed by: RHODA CHEN MD  Date:     09/11/17  Time:    23:04              Narrative:    EXAM:CT ABDOMEN PELVIS WITH CONTRAST 09/11/17 22:43:31      HISTORY: Abdominal distention    TECHNIQUE: Standard axial imaging performed with reformatted sagittal and coronal images.  Image is performed with IV contrast.      COMPARISON: None      FINDINGS:    Lung bases clear.    The liver is small in size and nodular with an enlarged caudate lobe.  Findings compatible with cirrhosis.  Diffuse heterogeneity with numerous centimeter hypodense nodules scattered diffusely throughout the liver.  These nodules may represent regenerating nodules.    Portal vein is patent.  Few collateral vessels are identified likely due to portal hypertension.  Moderate ascites present within the abdomen and pelvis.  The spleen is normal in size.    The pancreas is normal.  Adrenal glands are normal.  Aorta and IVC are normal.    Kidneys and ureters are normal.    Small hiatal hernia otherwise the stomach is normal.  Small intestine demonstrates mild circumferential small bowel wall thickening of the left mid abdomen which is nonspecific but could indicate enteritis.  The appendix is normal.  Colon is normal.  The rectum is normal.    Uterus surgically absent.  Adnexa regions are normal.  Bladder collapsed.    No suspicious bony abnormality detected.                             X-Ray Chest AP Portable (Final result)  Result time 09/11/17 20:44:06    Final result by Pam Jean Jr., MD (09/11/17 20:44:06)                 Impression:     No acute cardiopulmonary disease.      Electronically signed by: PAM JEAN  Date:     09/11/17  Time:    20:44              Narrative:    Exam: Portable  chest radiograph    History:    Chest pain    Findings:      The lungs are clear. The cardiac silhouette and mediastinum are within normal limits. The remaining osseous structures and soft tissues are within normal limits.

## 2017-09-14 NOTE — SUBJECTIVE & OBJECTIVE
Subjective:     Interval History:   The patient says she feels like the swelling is coming back in her abdomen. Cell count not consistent with SBP. SAAG consistent with portal hypertension. She hasn't eaten well today. She reported vomiting with oral potassium. She is now getting IV. She hasn't had a good BM while here.     Review of Systems   Constitutional:        See Interval History for daily ROS.      Objective:     Vital Signs (Most Recent):  Temp: 98.8 °F (37.1 °C) (09/14/17 1318)  Pulse: 92 (09/14/17 1318)  Resp: 18 (09/14/17 1318)  BP: 130/64 (09/14/17 1318)  SpO2: 100 % (09/14/17 1318) Vital Signs (24h Range):  Temp:  [97.8 °F (36.6 °C)-99.3 °F (37.4 °C)] 98.8 °F (37.1 °C)  Pulse:  [88-92] 92  Resp:  [17-18] 18  SpO2:  [95 %-100 %] 100 %  BP: (113-130)/(58-66) 130/64     Weight: 78.9 kg (173 lb 14.4 oz) (09/14/17 0348)  Body mass index is 30.81 kg/m².      Intake/Output Summary (Last 24 hours) at 09/14/17 1548  Last data filed at 09/14/17 0800   Gross per 24 hour   Intake              240 ml   Output              600 ml   Net             -360 ml       Lines/Drains/Airways     Peripheral Intravenous Line                 Peripheral IV - Single Lumen 09/12/17 0129 Left Forearm 2 days                Physical Exam   Constitutional: She is oriented to person, place, and time. She appears well-developed and well-nourished.   HENT:   Head: Normocephalic and atraumatic.   Eyes: Scleral icterus is present.   Cardiovascular: Normal rate and regular rhythm.    Pulmonary/Chest: Effort normal and breath sounds normal.   Abdominal: Soft. Bowel sounds are normal. She exhibits distension. There is no tenderness.   Neurological: She is alert and oriented to person, place, and time.   Psychiatric: Her behavior is normal.       Significant Labs:  CBC:   Recent Labs  Lab 09/13/17  0505 09/14/17  0500   WBC 8.18 5.81   HGB 8.0* 8.0*   HCT 24.3* 24.4*   * 86*     CMP:   Recent Labs  Lab 09/14/17  0500   GLU 91   CALCIUM  8.1*   ALBUMIN 2.3*   PROT 6.4   *   K 2.7*   CO2 31*   CL 98   BUN 5*   CREATININE 0.6   ALKPHOS 138*   ALT 19   AST 56*   BILITOT 10.1*     Coagulation:   Recent Labs  Lab 09/14/17  0500   INR 1.8*         Significant Imaging:  Imaging results within the past 24 hours have been reviewed.

## 2017-09-15 VITALS
OXYGEN SATURATION: 98 % | RESPIRATION RATE: 18 BRPM | SYSTOLIC BLOOD PRESSURE: 120 MMHG | HEIGHT: 63 IN | BODY MASS INDEX: 31.27 KG/M2 | DIASTOLIC BLOOD PRESSURE: 66 MMHG | WEIGHT: 176.5 LBS | HEART RATE: 98 BPM | TEMPERATURE: 99 F

## 2017-09-15 PROBLEM — E83.39 HYPOPHOSPHATEMIA: Status: RESOLVED | Noted: 2017-09-11 | Resolved: 2017-09-15

## 2017-09-15 PROBLEM — R93.3 ABNORMAL CT SCAN, SMALL BOWEL: Status: RESOLVED | Noted: 2017-09-12 | Resolved: 2017-09-15

## 2017-09-15 PROBLEM — R29.898 WEAKNESS OF DISTAL ARMS AND LEGS: Status: RESOLVED | Noted: 2017-09-11 | Resolved: 2017-09-15

## 2017-09-15 PROBLEM — D69.6 THROMBOCYTOPENIA, UNSPECIFIED: Status: RESOLVED | Noted: 2017-09-11 | Resolved: 2017-09-15

## 2017-09-15 LAB
ALBUMIN SERPL BCP-MCNC: 2.4 G/DL
ALP SERPL-CCNC: 131 U/L
ALT SERPL W/O P-5'-P-CCNC: 24 U/L
ANION GAP SERPL CALC-SCNC: 10 MMOL/L
ANISOCYTOSIS BLD QL SMEAR: SLIGHT
AST SERPL-CCNC: 78 U/L
BASOPHILS # BLD AUTO: 0.03 K/UL
BASOPHILS NFR BLD: 0.3 %
BILIRUB SERPL-MCNC: 11.2 MG/DL
BUN SERPL-MCNC: 5 MG/DL
CALCIUM SERPL-MCNC: 8.3 MG/DL
CHLORIDE SERPL-SCNC: 98 MMOL/L
CO2 SERPL-SCNC: 23 MMOL/L
CREAT SERPL-MCNC: 0.5 MG/DL
DIFFERENTIAL METHOD: ABNORMAL
EOSINOPHIL # BLD AUTO: 0 K/UL
EOSINOPHIL NFR BLD: 0.2 %
ERYTHROCYTE [DISTWIDTH] IN BLOOD BY AUTOMATED COUNT: 21.6 %
EST. GFR  (AFRICAN AMERICAN): >60 ML/MIN/1.73 M^2
EST. GFR  (NON AFRICAN AMERICAN): >60 ML/MIN/1.73 M^2
GLUCOSE SERPL-MCNC: 108 MG/DL
HCT VFR BLD AUTO: 27.5 %
HGB BLD-MCNC: 8.8 G/DL
HYPOCHROMIA BLD QL SMEAR: ABNORMAL
INR PPP: 1.8
LYMPHOCYTES # BLD AUTO: 1.4 K/UL
LYMPHOCYTES NFR BLD: 13 %
MCH RBC QN AUTO: 21.8 PG
MCHC RBC AUTO-ENTMCNC: 32 G/DL
MCV RBC AUTO: 68 FL
MONOCYTES # BLD AUTO: 0.9 K/UL
MONOCYTES NFR BLD: 9 %
NEUTROPHILS # BLD AUTO: 8 K/UL
NEUTROPHILS NFR BLD: 77.7 %
OVALOCYTES BLD QL SMEAR: ABNORMAL
PLATELET # BLD AUTO: 136 K/UL
PLATELET BLD QL SMEAR: ABNORMAL
PMV BLD AUTO: ABNORMAL FL
POIKILOCYTOSIS BLD QL SMEAR: ABNORMAL
POTASSIUM SERPL-SCNC: 4.7 MMOL/L
PROT SERPL-MCNC: 7 G/DL
PROTHROMBIN TIME: 18.1 SEC
RBC # BLD AUTO: 4.03 M/UL
SCHISTOCYTES BLD QL SMEAR: PRESENT
SODIUM SERPL-SCNC: 131 MMOL/L
STOMATOCYTES BLD QL SMEAR: PRESENT
TARGETS BLD QL SMEAR: ABNORMAL
WBC # BLD AUTO: 10.36 K/UL

## 2017-09-15 PROCEDURE — 80053 COMPREHEN METABOLIC PANEL: CPT

## 2017-09-15 PROCEDURE — 25000003 PHARM REV CODE 250: Performed by: NURSE PRACTITIONER

## 2017-09-15 PROCEDURE — 63600175 PHARM REV CODE 636 W HCPCS: Performed by: NURSE PRACTITIONER

## 2017-09-15 PROCEDURE — 36415 COLL VENOUS BLD VENIPUNCTURE: CPT

## 2017-09-15 PROCEDURE — 85025 COMPLETE CBC W/AUTO DIFF WBC: CPT

## 2017-09-15 PROCEDURE — 85610 PROTHROMBIN TIME: CPT

## 2017-09-15 RX ORDER — POLYETHYLENE GLYCOL 3350 17 G/17G
17 POWDER, FOR SOLUTION ORAL 2 TIMES DAILY
Qty: 100 EACH | Refills: 1 | Status: ON HOLD | OUTPATIENT
Start: 2017-09-15 | End: 2017-10-20

## 2017-09-15 RX ORDER — SPIRONOLACTONE 50 MG/1
50 TABLET, FILM COATED ORAL DAILY
Qty: 30 TABLET | Refills: 1 | Status: ON HOLD | OUTPATIENT
Start: 2017-09-16 | End: 2017-10-20

## 2017-09-15 RX ORDER — ONDANSETRON 4 MG/1
4 TABLET, ORALLY DISINTEGRATING ORAL EVERY 8 HOURS PRN
Qty: 12 TABLET | Refills: 0 | Status: ON HOLD | OUTPATIENT
Start: 2017-09-15 | End: 2018-01-05

## 2017-09-15 RX ORDER — POTASSIUM CHLORIDE 20 MEQ/1
40 TABLET, EXTENDED RELEASE ORAL ONCE
Status: COMPLETED | OUTPATIENT
Start: 2017-09-15 | End: 2017-09-15

## 2017-09-15 RX ADMIN — POTASSIUM CHLORIDE 40 MEQ: 1500 TABLET, EXTENDED RELEASE ORAL at 09:09

## 2017-09-15 RX ADMIN — PANTOPRAZOLE SODIUM 40 MG: 40 TABLET, DELAYED RELEASE ORAL at 09:09

## 2017-09-15 RX ADMIN — SPIRONOLACTONE 50 MG: 25 TABLET ORAL at 09:09

## 2017-09-15 RX ADMIN — ONDANSETRON 4 MG: 2 INJECTION INTRAMUSCULAR; INTRAVENOUS at 09:09

## 2017-09-15 RX ADMIN — LISINOPRIL 5 MG: 5 TABLET ORAL at 09:09

## 2017-09-15 NOTE — PLAN OF CARE
Problem: Patient Care Overview  Goal: Plan of Care Review  Outcome: Ongoing (interventions implemented as appropriate)  Plan of care reviewed with patient, verbalized understanding. Pt remained free from falls, fall precautions in place. Pt is NSR on monitor, 80s. Vital signs stable. Nausea controlled with PRN medication. No other c/o at this time. Call bell and personal belongings within reach. Hourly rounding completed. Reminded to call for assistance.

## 2017-09-15 NOTE — PHYSICIAN QUERY
PT Name: Marcie Bazan  MR #: [id number]     Physician Query Form - Etiology of Condition Clarification      CDS/: Nghia Melgar RN               Contact information:   [email or web address]  This form is a permanent document in the medical record.     Query Date: September 15, 2017    By submitting this query, we are merely seeking further clarification of documentation.  Please utilize your independent clinical judgment when addressing the question(s) below.     The medical record contains the following:    Findings Supporting Clinical Information Location in Medical Record   Principle problem: Chest pain Patient presents with    Chest Pain  - resolved after nitro. pain radiating to left arm with n/v      Interval History:     50 yo female with PERKINS presented complaining of chest pain which she ruled out for any cardiac issues but has PERKINS cirrhosis now with decompensated.      Gastrointestinal: Positive for abdominal distention.      She had a paracentesis this morning with 2.5 L removed. FFP given before. She is eating a little lunch. No vomiting. No BM during admission.      Abnormal CT scan, small bowel       CT scan suggested enteritis, but lacked oral contrast which limits the study. Also ascites can give a similar  appearance. No reason to think she has enteritis at this time. If needed, could consider SBFT.   Today patient feels better after paracentesis.   H&P  9/11        Progress 9/13-- [facility]            Progress 9/13-- [facility]      Progress 9/13- Karla/ gastro          Progress 9/13- Karla/gastro     Please document your best medical opinion regarding the etiology of _chest pain______ for which the primary focus of treatment is/was directed.       Patient etiology of Chest Pain likely due to PERKINS and subsequent abdominal distension. POA                  [    ]  Clinically Undetermined    Please document in your progress notes daily for the duration of treatment, until resolved, and include  in your discharge summary.

## 2017-09-15 NOTE — DISCHARGE SUMMARY
[facility] Hospital Medicine  Discharge Summary      Patient Name: Marcie Bazan  MRN: [id number]  Admission Date: 9/11/2017  Hospital Length of Stay: 3 days  Discharge Date and Time:  09/15/2017 1:44 PM  Attending Physician: Filemon Sorensen MD   Discharging Provider: Bisi Jonas NP  Primary Care Provider: ODILIA Wall      HPI:   Marcie Bazan is a 51 y.o. female patient with PMHx including HTN and liver disease who presents to the Emergency Department from [facility] urgent care via EMS for chest pain which onset gradually earlier today. The patient was given Nitro in route with resolution to pain. The patient currently denies CP at this time. Associated symptoms include reports nausea, emesis, palpitations and an episode of left side weakness. Pt reports abd distention that is chronic in nature and but appears worse today. PMHx liver disease, pt's currently under care of renal physician and hematologist.  Last seen by Hepatology, Syd Bradshaw MD.        * No surgery found *      Indwelling Lines/Drains at time of discharge:   Lines/Drains/Airways          No matching active lines, drains, or airways        Hospital Course:   Ultimately, pt was admitted due to electrolyte imbalances and decompensated cirrhosis secondary to PERKINS. She was having chest pain and abdominal distention and chest pain was related to GI symptoms - serial troponins were negative. Also, pt had reported left sided weakness that was intermittent (thought due to low electrolytes). Pt had hypokalemia, hypophosphatemia and hypomagnesemia were repleted. CXR showed no acute cardiopulmonary disease. CT head with no acute intracranial findings. CT chest/abd with Cirrhotic liver with Innumerable hypodense nodules most likely represent regenerating nodules and moderate ascites. GI saw the patient and after patient given FFP (elevated INR) she underwent a paracentesis due to new ascites (2.5 L) - SBP was ruled out.  Later, aldactone was prescribed to help stabilize potassium level - electrolytes were repleted. Pt with hx of chronic constipation which was relieved with stool softeners. Also, pt had low electrolytes due to chronic emesis. Labs returned to baseline and abdominal distension had decreased. She was seen and examined and determined to be safe and stable for discharge. Pt was advised to follow up with PCP and Hepatology. Miralax and Aldactone were e prescribed to designated pharmacy.          Consults:   Consults         Status Ordering Provider     Inpatient consult to Gastroenterology  Once     Provider:  Kamron Guevara MD    Completed CORETTA BEE     Inpatient consult to Hospitalist  Once     Provider:  Stevenson Allison MD    Acknowledged EVELIA ALDANA          Significant Diagnostic Studies:   Imaging Results          X-Ray Abdomen Flat And Erect (Final result)  Result time 09/14/17 12:46:43    Final result by JUAN Longo Sr., MD (09/14/17 12:46:43)                 Impression:      1. The bowel gas pattern is normal in appearance.   2. There are surgical clips projected over the right upper quadrant of the abdomen. This is characteristic of a prior cholecystectomy.  3. There are 4 lumbar-type vertebral bodies. This is characteristic of an incidental finding.      Electronically signed by: JUAN LONGO MD  Date:     09/14/17  Time:    12:46              Narrative:    Flat and erect KUB    History: Abdominal pain    Finding: Comparison was made to a prior examination performed on 5/8/2017. The bowel gas pattern is normal in appearance. There are surgical clips projected over the right upper quadrant of the abdomen. There is no pneumoperitoneum. There are 4 lumbar-type vertebral bodies.                             US Guided Paracentesis (Final result)  Result time 09/13/17 11:41:38    Final result by Per Chávez MD (09/13/17 11:41:38)                 Impression:         Successful ultrasound-guided  paracentesis as described above.          Electronically signed by: THERESE EDWARDS MD  Date:     09/13/17  Time:    11:41              Narrative:    Ultrasound-guided paracentesis, Wednesday, September 13, 2017    History:   ascites.    The procedure was explained in detail and informed consent. Time out was performed. Utilizing sterile technique and cutaneous anesthesia a 5 Cameroonian catheter was inserted into the left lower quadrant with the assistance of Sushant Jose. Approximately 2.5 liters of light cale fluid was removed. The patient tolerated the procedure well. No complications were encountered. If requested the fluid was delivered to the pathology department for assessment.                             CT Head Without Contrast (Final result)  Result time 09/11/17 22:58:14    Final result by Rhoda Clay MD (09/11/17 22:58:14)                 Impression:        No acute intracranial findings.      All CT scans at this facility use dose modulation, iterative reconstruction and/or weight based dosing when appropriate to reduced radiation dose to as low as reasonably achievable.        Electronically signed by: RHODA CLAY MD  Date:     09/11/17  Time:    22:58              Narrative:    EXAM:CT HEAD WITHOUT CONTRAST 09/11/17 22:43:31      HISTORY: Altered mental status.  Weakness.  Lethargy.    TECHNIQUE: Standard axial imaging performed with reformatted sagittal and coronal images.       COMPARISON: None      FINDINGS:    Midline structures intact.  Age-appropriate atrophy present with mild prominence of ventricular system and cortical sulci.  Normal differentiation of gray white matter.    Skull base and calvarium are normal.  Visualized paranasal sinuses and mastoid air cells are clear.                             CT Abdomen Pelvis With Contrast (Final result)  Result time 09/11/17 23:04:34    Final result by Rhoda Clay MD (09/11/17 23:04:34)                 Impression:         Cirrhotic liver.  Innumerable hypodense nodules most likely represent regenerating nodules.    Moderate ascites.    Mild nonspecific circumferential small bowel wall thickening in the mid small bowel left abdomen.  Finding could indicate enteritis.      All CT scans at this facility use dose modulation, iterative reconstruction and/or weight based dosing when appropriate to reduced radiation dose to as low as reasonably achievable.        Electronically signed by: RHODA CHEN MD  Date:     09/11/17  Time:    23:04              Narrative:    EXAM:CT ABDOMEN PELVIS WITH CONTRAST 09/11/17 22:43:31      HISTORY: Abdominal distention    TECHNIQUE: Standard axial imaging performed with reformatted sagittal and coronal images.  Image is performed with IV contrast.      COMPARISON: None      FINDINGS:    Lung bases clear.    The liver is small in size and nodular with an enlarged caudate lobe.  Findings compatible with cirrhosis.  Diffuse heterogeneity with numerous centimeter hypodense nodules scattered diffusely throughout the liver.  These nodules may represent regenerating nodules.    Portal vein is patent.  Few collateral vessels are identified likely due to portal hypertension.  Moderate ascites present within the abdomen and pelvis.  The spleen is normal in size.    The pancreas is normal.  Adrenal glands are normal.  Aorta and IVC are normal.    Kidneys and ureters are normal.    Small hiatal hernia otherwise the stomach is normal.  Small intestine demonstrates mild circumferential small bowel wall thickening of the left mid abdomen which is nonspecific but could indicate enteritis.  The appendix is normal.  Colon is normal.  The rectum is normal.    Uterus surgically absent.  Adnexa regions are normal.  Bladder collapsed.    No suspicious bony abnormality detected.                             X-Ray Chest AP Portable (Final result)  Result time 09/11/17 20:44:06    Final result by Jarrod Jean Jr.,  MD (09/11/17 20:44:06)                 Impression:     No acute cardiopulmonary disease.      Electronically signed by: PAM PAM JARA  Date:     09/11/17  Time:    20:44              Narrative:    Exam: Portable chest radiograph    History:    Chest pain    Findings:      The lungs are clear. The cardiac silhouette and mediastinum are within normal limits. The remaining osseous structures and soft tissues are within normal limits.                              Pending Diagnostic Studies:     None        Final Active Diagnoses:    Diagnosis Date Noted POA    Coagulopathy [D68.9] 09/13/2017 Yes    Jaundice [R17] 09/11/2017 Yes    Ascites [R18.8] 09/11/2017 Yes    Liver cirrhosis secondary to PERKINS [K75.81, K74.60] 07/05/2016 Yes    Anemia [D64.9] 08/27/2015 Yes      Problems Resolved During this Admission:    Diagnosis Date Noted Date Resolved POA    PRINCIPAL PROBLEM:  Chest pain [R07.9] 09/11/2017 09/14/2017 Yes    Hypomagnesemia [E83.42] 09/14/2017 09/14/2017 Yes    Abnormal CT scan, small bowel [R93.3] 09/12/2017 09/15/2017 Yes    Thrombocytopenia, unspecified [D69.6] 09/11/2017 09/15/2017 Unknown    Weakness of distal arms and legs, of left [R29.898] 09/11/2017 09/15/2017 Yes    Hypophosphatemia [E83.39] 09/11/2017 09/15/2017 Unknown    Hypokalemia [E87.6] 09/17/2015 09/15/2017 Yes      No new Assessment & Plan notes have been filed under this hospital service since the last note was generated.  Service: Hospital Medicine      Discharged Condition: stable    Disposition: Home or Self Care    Follow Up:  Follow-up Information     ODILIA Wall.    Specialty:  Family Medicine  Contact information:  [address] [facility] [address] [phone]             Briana Bishop .    Why:  Keep F/U appointment scheduled 9/15/17 for Anemia and Iron treatments           Andrew Bradshaw MD.    Specialty:  Gastroenterology  Why:  Follow up with physician  Contact  information:  [address]  [facility]  [place] [address]  [phone]                 Patient Instructions:     Diet general     Activity as tolerated     Call MD for:  temperature >100.4     Call MD for:  persistent nausea and vomiting or diarrhea     Call MD for:  severe uncontrolled pain       Medications:  Reconciled Home Medications:   Discharge Medication List as of 9/15/2017 11:50 AM      START taking these medications    Details   ondansetron (ZOFRAN-ODT) 4 MG TbDL Take 1 tablet (4 mg total) by mouth every 8 (eight) hours as needed (nausea and vomiting)., Starting Fri 9/15/2017, Normal      polyethylene glycol (GLYCOLAX) 17 gram PwPk Take 17 g by mouth 2 (two) times daily., Starting Fri 9/15/2017, Normal      spironolactone (ALDACTONE) 50 MG tablet Take 1 tablet (50 mg total) by mouth once daily., Starting Sat 9/16/2017, Until Sun 9/16/2018, Normal         CONTINUE these medications which have NOT CHANGED    Details   cetirizine (ZYRTEC) 10 MG tablet Take 10 mg by mouth once daily., Starting Thu 2/9/2017, Until Fri 2/9/2018, Historical Med      lisinopril (PRINIVIL,ZESTRIL) 5 MG tablet Take 5 mg by mouth once daily., Starting Thu 6/8/2017, Historical Med      omeprazole (PRILOSEC) 40 MG capsule Take 40 mg by mouth once daily., Starting Wed 3/8/2017, Historical Med      aspirin 325 MG tablet Take 325 mg by mouth once daily., Starting Mon 9/11/2017, Until Mon 9/11/2017, Historical Med      gabapentin (NEURONTIN) 300 MG capsule Take 300 mg by mouth., Starting 12/22/2015, Until Wed 12/21/16, Historical Med      nitroGLYCERIN (NITROSTAT) 0.4 MG SL tablet Place 0.4 mg under the tongue every 5 (five) minutes as needed., Starting Mon 9/11/2017, Until Mon 9/11/2017, Historical Med           Time spent on the discharge of patient: > 30 minutes    HOS POC IP DISCHARGE SUMMARY    Bisi Jonas NP  Department of Hospital Medicine  [facility]

## 2017-09-17 LAB
BACTERIA FLD AEROBE CULT: NO GROWTH
GRAM STN SPEC: NORMAL
GRAM STN SPEC: NORMAL

## 2017-09-18 ENCOUNTER — PATIENT OUTREACH (OUTPATIENT)
Dept: ADMINISTRATIVE | Facility: CLINIC | Age: 52
End: 2017-09-18

## 2017-09-18 ENCOUNTER — NURSE TRIAGE (OUTPATIENT)
Dept: ADMINISTRATIVE | Facility: CLINIC | Age: 52
End: 2017-09-18

## 2017-09-18 ENCOUNTER — HOSPITAL ENCOUNTER (EMERGENCY)
Facility: HOSPITAL | Age: 52
Discharge: HOME | End: 2017-09-18
Attending: EMERGENCY MEDICINE
Payer: MEDICAID

## 2017-09-18 VITALS
BODY MASS INDEX: 29.32 KG/M2 | SYSTOLIC BLOOD PRESSURE: 127 MMHG | HEART RATE: 97 BPM | TEMPERATURE: 98 F | HEIGHT: 65 IN | DIASTOLIC BLOOD PRESSURE: 61 MMHG | OXYGEN SATURATION: 100 % | RESPIRATION RATE: 16 BRPM | WEIGHT: 176 LBS

## 2017-09-18 DIAGNOSIS — R06.09 DYSPNEA ON EXERTION: ICD-10-CM

## 2017-09-18 DIAGNOSIS — R17 JAUNDICE: Primary | ICD-10-CM

## 2017-09-18 DIAGNOSIS — R53.1 WEAKNESS: ICD-10-CM

## 2017-09-18 DIAGNOSIS — R06.02 SOB (SHORTNESS OF BREATH): ICD-10-CM

## 2017-09-18 DIAGNOSIS — R07.9 CHEST PAIN: ICD-10-CM

## 2017-09-18 LAB
ALBUMIN SERPL BCP-MCNC: 2.6 G/DL
ALP SERPL-CCNC: 148 U/L
ALT SERPL W/O P-5'-P-CCNC: 26 U/L
ANION GAP SERPL CALC-SCNC: 11 MMOL/L
ANISOCYTOSIS BLD QL SMEAR: ABNORMAL
APTT BLDCRRT: 29.6 SEC
AST SERPL-CCNC: 83 U/L
BACTERIA BLD CULT: NORMAL
BASOPHILS # BLD AUTO: 0.05 K/UL
BASOPHILS NFR BLD: 0.5 %
BILIRUB SERPL-MCNC: 12.8 MG/DL
BNP SERPL-MCNC: 21 PG/ML
BUN SERPL-MCNC: 6 MG/DL
CALCIUM SERPL-MCNC: 8.6 MG/DL
CHLORIDE SERPL-SCNC: 95 MMOL/L
CO2 SERPL-SCNC: 25 MMOL/L
CREAT SERPL-MCNC: 0.7 MG/DL
DIFFERENTIAL METHOD: ABNORMAL
EOSINOPHIL # BLD AUTO: 0.1 K/UL
EOSINOPHIL NFR BLD: 0.8 %
ERYTHROCYTE [DISTWIDTH] IN BLOOD BY AUTOMATED COUNT: 22.4 %
EST. GFR  (AFRICAN AMERICAN): >60 ML/MIN/1.73 M^2
EST. GFR  (NON AFRICAN AMERICAN): >60 ML/MIN/1.73 M^2
GLUCOSE SERPL-MCNC: 106 MG/DL
HCT VFR BLD AUTO: 28.7 %
HGB BLD-MCNC: 9.3 G/DL
HYPOCHROMIA BLD QL SMEAR: ABNORMAL
INR PPP: 1.7
LYMPHOCYTES # BLD AUTO: 2.3 K/UL
LYMPHOCYTES NFR BLD: 21.9 %
MCH RBC QN AUTO: 22 PG
MCHC RBC AUTO-ENTMCNC: 32.4 G/DL
MCV RBC AUTO: 68 FL
MONOCYTES # BLD AUTO: 1.1 K/UL
MONOCYTES NFR BLD: 10.8 %
NEUTROPHILS # BLD AUTO: 7 K/UL
NEUTROPHILS NFR BLD: 66.2 %
OVALOCYTES BLD QL SMEAR: ABNORMAL
PLATELET # BLD AUTO: 168 K/UL
PLATELET BLD QL SMEAR: ABNORMAL
PMV BLD AUTO: ABNORMAL FL
POIKILOCYTOSIS BLD QL SMEAR: ABNORMAL
POLYCHROMASIA BLD QL SMEAR: ABNORMAL
POTASSIUM SERPL-SCNC: 3.2 MMOL/L
PROT SERPL-MCNC: 7.7 G/DL
PROTHROMBIN TIME: 17.2 SEC
RBC # BLD AUTO: 4.22 M/UL
SODIUM SERPL-SCNC: 131 MMOL/L
STOMATOCYTES BLD QL SMEAR: PRESENT
TARGETS BLD QL SMEAR: ABNORMAL
TROPONIN I SERPL DL<=0.01 NG/ML-MCNC: <0.006 NG/ML
WBC # BLD AUTO: 10.53 K/UL

## 2017-09-18 PROCEDURE — 85025 COMPLETE CBC W/AUTO DIFF WBC: CPT

## 2017-09-18 PROCEDURE — 93005 ELECTROCARDIOGRAM TRACING: CPT

## 2017-09-18 PROCEDURE — 80053 COMPREHEN METABOLIC PANEL: CPT

## 2017-09-18 PROCEDURE — 99284 EMERGENCY DEPT VISIT MOD MDM: CPT | Mod: 25

## 2017-09-18 PROCEDURE — 83880 ASSAY OF NATRIURETIC PEPTIDE: CPT

## 2017-09-18 PROCEDURE — 93010 ELECTROCARDIOGRAM REPORT: CPT | Mod: ,,, | Performed by: INTERNAL MEDICINE

## 2017-09-18 PROCEDURE — 84484 ASSAY OF TROPONIN QUANT: CPT

## 2017-09-18 PROCEDURE — 85730 THROMBOPLASTIN TIME PARTIAL: CPT

## 2017-09-18 PROCEDURE — 85610 PROTHROMBIN TIME: CPT

## 2017-09-18 NOTE — ED NOTES
Pt resting in ER stretcher, aaox4, rr e/u, NAD noted. VSS noted. Bed low and locked, call light in reach, side rails up x2. Pt verbalized understanding of status and POC; denies further needs. Will continue to monitor.

## 2017-09-18 NOTE — ED PROVIDER NOTES
SCRIBE #1 NOTE: ILanny, am scribing for, and in the presence of, Marisol Milligan NP. I have scribed the entire note.     SCRIBE #2 NOTE: ILanny, am scribing for, and in the presence of, Edvin Kenney NP.     History      Chief Complaint   Patient presents with    Shortness of Breath     pt c/o weakness and SOB, reports being discharged from the hospital Friday and states she feels the same       Review of patient's allergies indicates:   Allergen Reactions    Ferrous sulfate      Patient states the pill makes her sick. She stated she would rather have a shot        HPI   HPI    9/18/2017, 3:39 PM   History obtained from the patient      History of Present Illness: Marcie Bazan is a 51 y.o. female patient with PMHx of HTN and liver disease who presents to the Emergency Department for SOB. Symptoms are constant and moderate in severity. Pt was discharged from the hospital 3 days ago and sxs have not subsided. Pt notes that 2.5 L of peritoneal fluid was drained while in hospital. Pt also c/o of abd pain and CP. Sxs exacerbates when moving and upon inspiration. No mitigating factors reported. Associated sxs include dizziness, nausea and abd distension. Abd distension is always present but worse today. Patient denies any fever, v/d, constipation, hematochezia, dysuria, hematuria, coughing, wheezing, leg swelling, palpitations, HA, weakness, numbness, and all other sxs at this time. No further complaints or concerns at this time.     Arrival mode: Personal vehicle    PCP: ODILIA Wall       Past Medical History:  Past Medical History:   Diagnosis Date    Hypertension     Liver cirrhosis        Past Surgical History:  Past Surgical History:   Procedure Laterality Date    BREAST SURGERY      CHOLECYSTECTOMY      HYSTERECTOMY           Family History:  Family History   Problem Relation Age of Onset    Hypertension Mother     Hypertension Father     Diabetes Father     Diabetes  Sister     Depression Maternal Grandfather        Social History:  Social History     Social History Main Topics    Smoking status: Never Smoker    Smokeless tobacco: Never Used    Alcohol use Yes      Comment: occasionally    Drug use: No    Sexual activity: Not Currently       ROS   Review of Systems   Constitutional: Negative for chills, diaphoresis and fever.   HENT: Negative for sore throat.    Respiratory: Positive for shortness of breath. Negative for cough and wheezing.    Cardiovascular: Positive for chest pain. Negative for palpitations and leg swelling.   Gastrointestinal: Positive for abdominal distention, abdominal pain and nausea. Negative for anal bleeding, blood in stool, constipation, diarrhea and vomiting.   Genitourinary: Negative for decreased urine volume, difficulty urinating, dysuria, flank pain and hematuria.   Musculoskeletal: Negative for back pain.   Skin: Negative for rash.   Neurological: Positive for dizziness. Negative for weakness, light-headedness, numbness and headaches.   Hematological: Does not bruise/bleed easily.   Psychiatric/Behavioral: Negative for confusion.       Physical Exam      Initial Vitals [09/18/17 1505]   BP Pulse Resp Temp SpO2   139/86 109 20 98.2 °F (36.8 °C) 100 %      MAP       103.67          Physical Exam   Abdominal: She exhibits ascites. There is no tenderness.     Nursing Notes and Vital Signs Reviewed.  Constitutional: Patient is in no acute distress. Well-developed and well-nourished.  Head: Atraumatic. Normocephalic.  Eyes: PERRL. EOM intact. Conjunctivae are not pale. No scleral icterus.  ENT: Mucous membranes are moist. Oropharynx is clear and symmetric.    Neck: Supple. Full ROM. No lymphadenopathy.  Cardiovascular: Regular rate. Regular rhythm. No murmurs, rubs, or gallops. Distal pulses are 2+ and symmetric.  Pulmonary/Chest: No respiratory distress. Clear to auscultation bilaterally. No wheezing, rales, or rhonchi. Difficulty raking a deep  "breath.  Genitourinary: No CVA tenderness  Musculoskeletal: Moves all extremities. No obvious deformities. No edema. No calf tenderness.  Skin: Warm and dry.  Neurological:  Alert, awake, and appropriate.  Normal speech.  No acute focal neurological deficits are appreciated.  Psychiatric: Normal affect. Good eye contact. Appropriate in content.    ED Course    Procedures  ED Vital Signs:  Vitals:    09/18/17 1505 09/18/17 1818 09/18/17 1957   BP: 139/86 123/60 127/61   Pulse: 109 98 97   Resp: 20 15 16   Temp: 98.2 °F (36.8 °C)  98 °F (36.7 °C)   TempSrc: Oral  Oral   SpO2: 100% 100% 100%   Weight: 79.8 kg (176 lb)     Height: 5' 5" (1.651 m)         Abnormal Lab Results:  Labs Reviewed   COMPREHENSIVE METABOLIC PANEL - Abnormal; Notable for the following:        Result Value    Sodium 131 (*)     Potassium 3.2 (*)     Calcium 8.6 (*)     Albumin 2.6 (*)     Total Bilirubin 12.8 (*)     Alkaline Phosphatase 148 (*)     AST 83 (*)     All other components within normal limits   CBC W/ AUTO DIFFERENTIAL - Abnormal; Notable for the following:     Hemoglobin 9.3 (*)     Hematocrit 28.7 (*)     MCV 68 (*)     MCH 22.0 (*)     RDW 22.4 (*)     Mono # 1.1 (*)     All other components within normal limits   PROTIME-INR - Abnormal; Notable for the following:     Prothrombin Time 17.2 (*)     INR 1.7 (*)     All other components within normal limits   APTT   B-TYPE NATRIURETIC PEPTIDE   TROPONIN I        All Lab Results:  Results for orders placed or performed during the hospital encounter of 09/18/17   Comprehensive metabolic panel   Result Value Ref Range    Sodium 131 (L) 136 - 145 mmol/L    Potassium 3.2 (L) 3.5 - 5.1 mmol/L    Chloride 95 95 - 110 mmol/L    CO2 25 23 - 29 mmol/L    Glucose 106 70 - 110 mg/dL    BUN, Bld 6 6 - 20 mg/dL    Creatinine 0.7 0.5 - 1.4 mg/dL    Calcium 8.6 (L) 8.7 - 10.5 mg/dL    Total Protein 7.7 6.0 - 8.4 g/dL    Albumin 2.6 (L) 3.5 - 5.2 g/dL    Total Bilirubin 12.8 (H) 0.1 - 1.0 mg/dL    " Alkaline Phosphatase 148 (H) 55 - 135 U/L    AST 83 (H) 10 - 40 U/L    ALT 26 10 - 44 U/L    Anion Gap 11 8 - 16 mmol/L    eGFR if African American >60 >60 mL/min/1.73 m^2    eGFR if non African American >60 >60 mL/min/1.73 m^2   CBC auto differential   Result Value Ref Range    WBC 10.53 3.90 - 12.70 K/uL    RBC 4.22 4.00 - 5.40 M/uL    Hemoglobin 9.3 (L) 12.0 - 16.0 g/dL    Hematocrit 28.7 (L) 37.0 - 48.5 %    MCV 68 (L) 82 - 98 fL    MCH 22.0 (L) 27.0 - 31.0 pg    MCHC 32.4 32.0 - 36.0 g/dL    RDW 22.4 (H) 11.5 - 14.5 %    Platelets 168 150 - 350 K/uL    MPV SEE COMMENT 9.2 - 12.9 fL    Gran # 7.0 1.8 - 7.7 K/uL    Lymph # 2.3 1.0 - 4.8 K/uL    Mono # 1.1 (H) 0.3 - 1.0 K/uL    Eos # 0.1 0.0 - 0.5 K/uL    Baso # 0.05 0.00 - 0.20 K/uL    Gran% 66.2 38.0 - 73.0 %    Lymph% 21.9 18.0 - 48.0 %    Mono% 10.8 4.0 - 15.0 %    Eosinophil% 0.8 0.0 - 8.0 %    Basophil% 0.5 0.0 - 1.9 %    Platelet Estimate Appears normal     Aniso Moderate     Poik Moderate     Poly Occasional     Hypo Moderate     Ovalocytes Occasional     Target Cells Moderate     Stomatocytes Present     Differential Method Automated    Protime-INR   Result Value Ref Range    Prothrombin Time 17.2 (H) 9.0 - 12.5 sec    INR 1.7 (H) 0.8 - 1.2   APTT   Result Value Ref Range    aPTT 29.6 21.0 - 32.0 sec   Brain natriuretic peptide   Result Value Ref Range    BNP 21 0 - 99 pg/mL   Troponin I   Result Value Ref Range    Troponin I <0.006 0.000 - 0.026 ng/mL       Imaging Results:  Imaging Results          US Abdomen Limited (Final result)  Result time 09/18/17 19:34:13    Final result by Pola Ngo MD (09/18/17 19:34:13)                 Impression:     Cirrhotic morphology of liver. Ascites. Prior cholecystectomy.      Electronically signed by: POLA NGO MD  Date:     09/18/17  Time:    19:34              Narrative:    History: Right upper quadrant abdominal pain, abdominal distention, history cirrhosis    Prior cholecystectomy. There is diffuse  heterogeneous echotexture of the liver and lobular liver contour consistent with stated history of cirrhosis. Moderate ascites. There is normal hepatopedal flow in the main portal vein. The common bile duct is normal in caliber at 3 mm. No abnormality of the pancreas or right kidney is seen.                             X-Ray Chest 1 View (Final result)  Result time 09/18/17 16:12:11    Final result by JUAN Longo Sr., MD (09/18/17 16:12:11)                 Impression:      Normal study.      Electronically signed by: JUAN LONGO MD  Date:     09/18/17  Time:    16:12              Narrative:    One-view chest x-ray    Clinical History: R07.9 Chest pain, unspecified; R06.02 Shortness of breath    Finding: Comparison is made to prior examination performed on 9/11/2017. The size of the heart is normal. The lungs are clear. There is no pneumothorax.  The costophrenic angles are sharp.                             The EKG was ordered, reviewed, and independently interpreted by the ED provider.  Interpretation time: 1614  Rate: 95 BPM  Rhythm: Unusual P axis ahd short WI, probable junctional tachycardia  Interpretation: Prolonged QT. No STEMI.         The Emergency Provider reviewed the vital signs and test results, which are outlined above.    ED Discussion     4:01 PM: Imaging reviewed from 9/14/17. Abd x-ray impression: 1. The bowel gas pattern is normal in appearance. 2. There are surgical clips projected over the right upper quadrant of the abdomen. This is characteristic of a prior cholecystectomy. 3. There are 4 lumbar-type vertebral bodies. This is characteristic of an incidental finding.    5:10 PM: Marisol Milligan NP transfers care of pt to Edvin Kenney NP pending lab results.    6:09 PM: Edvin discussed the pt's case with Bisi Jonas NP (Hospital Medicine) who recommends getting an US.    6:59 PM: Dr. Sorensen is at bedside with pt at this time. He agrees with pt being discharged home and f/u with GI.      7:43 PM: Reassessed pt at this time. Discussed with pt all pertinent ED information and results. Discussed pt dx and plan of tx. Gave pt all f/u and return to the ED instructions. All questions and concerns were addressed at this time. Pt expresses understanding of information and instructions, and is comfortable with plan to discharge. Pt is stable for discharge.      ED Medication(s):  Medications - No data to display    Discharge Medication List as of 9/18/2017  7:44 PM          Follow-up Information     Schedule an appointment as soon as possible for a visit  with ODILIA Wall.    Specialty:  Family Medicine  Contact information:  [address] [facility] [address]  [phone]             [facility]. Schedule an appointment as soon as possible for a visit in 2 days.                   Medical Decision Making    Medical Decision Making:   Clinical Tests:   Lab Tests: Reviewed and Ordered  Radiological Study: Reviewed and Ordered  Medical Tests: Reviewed and Ordered           Scribe Attestation:   Scribe #1: I performed the above scribed service and the documentation accurately describes the services I performed. I attest to the accuracy of the note.    Attending:   Physician Attestation Statement for Scribe #1: I, Marisol Milligan NP, personally performed the services described in this documentation, as scribed by Lanny Terrell, in my presence, and it is both accurate and complete.       Scribe Attestation:   Scribe #1: I performed the above scribed service and the documentation accurately describes the services I performed. I attest to the accuracy of the note.    Attending Attestation:           Physician Attestation for Scribe:    Physician Attestation Statement for Scribe #2: I, Edvin Kenney NP, reviewed documentation, as scribed by Lanny Terrell in my presence, and it is both accurate and complete. I also acknowledge and confirm the content of the note done by Molly  #1.          Clinical Impression       ICD-10-CM ICD-9-CM   1. Jaundice R17 782.4   2. SOB (shortness of breath) R06.02 786.05   3. Chest pain R07.9 786.50   4. Dyspnea on exertion R06.09 786.09   5. Weakness R53.1 780.79       Disposition:   Disposition: Discharged  Condition: Stable         Edvin Kenney NP  09/19/17 0110

## 2017-09-18 NOTE — PATIENT INSTRUCTIONS

## 2017-09-18 NOTE — TELEPHONE ENCOUNTER
"  Reason for Disposition   Constant abdominal pain lasting > 2 hours    Protocols used: ST ABDOMINAL PAIN - FEMALE-A-OH    I spoke with Marcie Bazan  for a TCC call following her recent discharge from [facility] for S/P Paracentesis / chest pain / nausea.  Pt is stating that she doesn't want to get out of bed because "all of those things start happening again and my stomach is swollen again".  Per protocol, pt was advised to call her daughter to help her get dressed and take her to the ER.  Patient stated that she would follow my instructions.  "

## 2017-09-18 NOTE — PLAN OF CARE
Patient discharged today and is an established patient at [facility].       09/18/17 1639   Final Note   Assessment Type Final Discharge Note   Discharge Disposition Home   What phone number can be called within the next 1-3 days to see how you are doing after discharge? [phone]   Right Care Referral Info   Post Acute Recommendation No Care

## 2017-09-27 ENCOUNTER — TELEPHONE (OUTPATIENT)
Dept: TRANSPLANT | Facility: CLINIC | Age: 52
End: 2017-09-27

## 2017-09-27 NOTE — TELEPHONE ENCOUNTER
----- Message from Kerry Torres sent at 9/27/2017  3:09 PM CDT -----  Contact: Gabriela Roth  Would like to know if information was received on patient?    Call

## 2017-10-03 ENCOUNTER — TELEPHONE (OUTPATIENT)
Dept: TRANSPLANT | Facility: CLINIC | Age: 52
End: 2017-10-03

## 2017-10-03 NOTE — TELEPHONE ENCOUNTER
Initial referral received via fax from Dr Syd Kaiser's office.   Patient with NAFLD.  MELD 28  Referred for liver transplant for CONSULT  .    Referral completed and forwarded to Oksana Monsalve, [facility].      Insurance: [place]  [facility]  ID# [id number]  Contact #

## 2017-10-16 DIAGNOSIS — Z76.82 ORGAN TRANSPLANT CANDIDATE: ICD-10-CM

## 2017-10-16 DIAGNOSIS — K75.81 NASH (NONALCOHOLIC STEATOHEPATITIS): Primary | ICD-10-CM

## 2017-10-19 ENCOUNTER — OFFICE VISIT (OUTPATIENT)
Dept: TRANSPLANT | Facility: CLINIC | Age: 52
DRG: 432 | End: 2017-10-19
Payer: MEDICAID

## 2017-10-19 ENCOUNTER — HOSPITAL ENCOUNTER (INPATIENT)
Facility: HOSPITAL | Age: 52
LOS: 6 days | Discharge: HOME HEALTH | DRG: 432 | End: 2017-10-25
Attending: EMERGENCY MEDICINE | Admitting: HOSPITALIST
Payer: MEDICAID

## 2017-10-19 VITALS
HEART RATE: 104 BPM | TEMPERATURE: 98 F | RESPIRATION RATE: 17 BRPM | OXYGEN SATURATION: 98 % | WEIGHT: 163.81 LBS | HEIGHT: 65 IN | DIASTOLIC BLOOD PRESSURE: 83 MMHG | BODY MASS INDEX: 27.29 KG/M2 | SYSTOLIC BLOOD PRESSURE: 136 MMHG

## 2017-10-19 DIAGNOSIS — R60.1 ANASARCA: ICD-10-CM

## 2017-10-19 DIAGNOSIS — K74.60 CIRRHOSIS OF LIVER WITH ASCITES, UNSPECIFIED HEPATIC CIRRHOSIS TYPE: Primary | ICD-10-CM

## 2017-10-19 DIAGNOSIS — K72.90 DECOMPENSATED HEPATIC CIRRHOSIS: ICD-10-CM

## 2017-10-19 DIAGNOSIS — D72.829 LEUKOCYTOSIS, UNSPECIFIED TYPE: Primary | ICD-10-CM

## 2017-10-19 DIAGNOSIS — D50.0 IRON DEFICIENCY ANEMIA DUE TO CHRONIC BLOOD LOSS: ICD-10-CM

## 2017-10-19 DIAGNOSIS — E87.6 HYPOKALEMIA: ICD-10-CM

## 2017-10-19 DIAGNOSIS — R18.8 OTHER ASCITES: ICD-10-CM

## 2017-10-19 DIAGNOSIS — R16.0 LIVER MASSES: ICD-10-CM

## 2017-10-19 DIAGNOSIS — R18.8 CIRRHOSIS OF LIVER WITH ASCITES, UNSPECIFIED HEPATIC CIRRHOSIS TYPE: Primary | ICD-10-CM

## 2017-10-19 DIAGNOSIS — R10.9 ABDOMINAL PAIN, UNSPECIFIED ABDOMINAL LOCATION: ICD-10-CM

## 2017-10-19 DIAGNOSIS — K74.60 DECOMPENSATED HEPATIC CIRRHOSIS: ICD-10-CM

## 2017-10-19 DIAGNOSIS — Z01.818 PRE-TRANSPLANT EVALUATION FOR LIVER TRANSPLANT: ICD-10-CM

## 2017-10-19 DIAGNOSIS — I85.10 SECONDARY ESOPHAGEAL VARICES WITHOUT BLEEDING: ICD-10-CM

## 2017-10-19 DIAGNOSIS — Z76.82 ORGAN TRANSPLANT CANDIDATE: ICD-10-CM

## 2017-10-19 PROBLEM — E43 SEVERE PROTEIN-CALORIE MALNUTRITION: Status: ACTIVE | Noted: 2017-10-19

## 2017-10-19 PROBLEM — E72.20 SERUM AMMONIA INCREASED: Status: ACTIVE | Noted: 2017-10-19

## 2017-10-19 PROBLEM — E44.0 MODERATE PROTEIN-CALORIE MALNUTRITION: Status: ACTIVE | Noted: 2017-10-19

## 2017-10-19 PROBLEM — E87.1 HYPONATREMIA: Status: ACTIVE | Noted: 2017-10-19

## 2017-10-19 PROBLEM — K70.11 ALCOHOLIC HEPATITIS WITH ASCITES: Status: ACTIVE | Noted: 2017-10-19

## 2017-10-19 PROBLEM — E83.42 HYPOMAGNESEMIA: Status: ACTIVE | Noted: 2017-10-19

## 2017-10-19 PROBLEM — R79.89 ELEVATED LACTIC ACID LEVEL: Status: ACTIVE | Noted: 2017-10-19

## 2017-10-19 PROBLEM — R77.2 ELEVATED AFP: Status: ACTIVE | Noted: 2017-10-19

## 2017-10-19 LAB
ALBUMIN SERPL BCP-MCNC: 2.1 G/DL
ALBUMIN SERPL BCP-MCNC: 2.5 G/DL
ALP SERPL-CCNC: 155 U/L
ALP SERPL-CCNC: 195 U/L
ALT SERPL W/O P-5'-P-CCNC: 24 U/L
ALT SERPL W/O P-5'-P-CCNC: 29 U/L
AMMONIA PLAS-SCNC: 63 UMOL/L
AMPHET+METHAMPHET UR QL: NEGATIVE
ANION GAP SERPL CALC-SCNC: 11 MMOL/L
ANION GAP SERPL CALC-SCNC: 14 MMOL/L
APPEARANCE FLD: CLEAR
AST SERPL-CCNC: 63 U/L
AST SERPL-CCNC: 74 U/L
BACTERIA #/AREA URNS AUTO: ABNORMAL /HPF
BARBITURATES UR QL SCN>200 NG/ML: NEGATIVE
BASOPHILS # BLD AUTO: 0.09 K/UL
BASOPHILS NFR BLD: 0.6 %
BENZODIAZ UR QL SCN>200 NG/ML: NEGATIVE
BILIRUB SERPL-MCNC: 12.1 MG/DL
BILIRUB SERPL-MCNC: 14.5 MG/DL
BILIRUB UR QL STRIP: ABNORMAL
BILIRUB UR QL STRIP: ABNORMAL
BODY FLD TYPE: NORMAL
BUN SERPL-MCNC: 10 MG/DL
BUN SERPL-MCNC: 10 MG/DL
BZE UR QL SCN: NEGATIVE
CALCIUM SERPL-MCNC: 8.4 MG/DL
CALCIUM SERPL-MCNC: 9 MG/DL
CANNABINOIDS UR QL SCN: NEGATIVE
CHLORIDE SERPL-SCNC: 88 MMOL/L
CHLORIDE SERPL-SCNC: 93 MMOL/L
CLARITY UR REFRACT.AUTO: ABNORMAL
CLARITY UR REFRACT.AUTO: ABNORMAL
CO2 SERPL-SCNC: 26 MMOL/L
CO2 SERPL-SCNC: 27 MMOL/L
COLOR FLD: YELLOW
COLOR UR AUTO: ABNORMAL
COLOR UR AUTO: ABNORMAL
CREAT SERPL-MCNC: 0.9 MG/DL
CREAT SERPL-MCNC: 1.1 MG/DL
CREAT UR-MCNC: 265 MG/DL
DIFFERENTIAL METHOD: ABNORMAL
EOSINOPHIL # BLD AUTO: 0.2 K/UL
EOSINOPHIL NFR BLD: 1.1 %
ERYTHROCYTE [DISTWIDTH] IN BLOOD BY AUTOMATED COUNT: 26.5 %
EST. GFR  (AFRICAN AMERICAN): >60 ML/MIN/1.73 M^2
EST. GFR  (AFRICAN AMERICAN): >60 ML/MIN/1.73 M^2
EST. GFR  (NON AFRICAN AMERICAN): 57.1 ML/MIN/1.73 M^2
EST. GFR  (NON AFRICAN AMERICAN): >60 ML/MIN/1.73 M^2
ETHANOL SERPL-MCNC: <10 MG/DL
ETHANOL UR-MCNC: <10 MG/DL
GLUCOSE SERPL-MCNC: 101 MG/DL
GLUCOSE SERPL-MCNC: 115 MG/DL
GLUCOSE UR QL STRIP: NEGATIVE
GLUCOSE UR QL STRIP: NEGATIVE
GRAM STN SPEC: NORMAL
HCT VFR BLD AUTO: 34.2 %
HGB BLD-MCNC: 11.8 G/DL
HGB UR QL STRIP: NEGATIVE
HGB UR QL STRIP: NEGATIVE
HYALINE CASTS UR QL AUTO: 21 /LPF
IMM GRANULOCYTES # BLD AUTO: 0.13 K/UL
IMM GRANULOCYTES NFR BLD AUTO: 0.9 %
KETONES UR QL STRIP: NEGATIVE
KETONES UR QL STRIP: NEGATIVE
LACTATE SERPL-SCNC: 2.8 MMOL/L
LEUKOCYTE ESTERASE UR QL STRIP: NEGATIVE
LEUKOCYTE ESTERASE UR QL STRIP: NEGATIVE
LYMPHOCYTES # BLD AUTO: 2.1 K/UL
LYMPHOCYTES NFR BLD: 14.1 %
LYMPHOCYTES NFR FLD MANUAL: 53 %
MAGNESIUM SERPL-MCNC: 1 MG/DL
MAGNESIUM SERPL-MCNC: 1.1 MG/DL
MCH RBC QN AUTO: 25.8 PG
MCHC RBC AUTO-ENTMCNC: 34.5 G/DL
MCV RBC AUTO: 75 FL
MESOTHL CELL NFR FLD MANUAL: 5 %
METHADONE UR QL SCN>300 NG/ML: NEGATIVE
MICROSCOPIC COMMENT: ABNORMAL
MONOCYTES # BLD AUTO: 1.2 K/UL
MONOCYTES NFR BLD: 8.1 %
MONOS+MACROS NFR FLD MANUAL: 39 %
NEUTROPHILS # BLD AUTO: 10.9 K/UL
NEUTROPHILS NFR BLD: 75.2 %
NEUTROPHILS NFR FLD MANUAL: 3 %
NITRITE UR QL STRIP: NEGATIVE
NITRITE UR QL STRIP: POSITIVE
NRBC BLD-RTO: 0 /100 WBC
OPIATES UR QL SCN: NEGATIVE
PCP UR QL SCN>25 NG/ML: NEGATIVE
PH UR STRIP: 5 [PH] (ref 5–8)
PH UR STRIP: 5 [PH] (ref 5–8)
PHOSPHATE SERPL-MCNC: 3.4 MG/DL
PLATELET # BLD AUTO: 165 K/UL
PMV BLD AUTO: ABNORMAL FL
POCT GLUCOSE: 115 MG/DL (ref 70–110)
POCT GLUCOSE: 126 MG/DL (ref 70–110)
POTASSIUM SERPL-SCNC: 2.7 MMOL/L
POTASSIUM SERPL-SCNC: 3 MMOL/L
PROT SERPL-MCNC: 6.8 G/DL
PROT SERPL-MCNC: 8.3 G/DL
PROT UR QL STRIP: NEGATIVE
PROT UR QL STRIP: NEGATIVE
RBC # BLD AUTO: 4.57 M/UL
SODIUM SERPL-SCNC: 129 MMOL/L
SODIUM SERPL-SCNC: 130 MMOL/L
SP GR UR STRIP: 1.02 (ref 1–1.03)
SP GR UR STRIP: 1.02 (ref 1–1.03)
SQUAMOUS #/AREA URNS AUTO: 11 /HPF
TOXICOLOGY INFORMATION: NORMAL
TROPONIN I SERPL DL<=0.01 NG/ML-MCNC: 0.09 NG/ML
URN SPEC COLLECT METH UR: ABNORMAL
URN SPEC COLLECT METH UR: ABNORMAL
UROBILINOGEN UR STRIP-ACNC: 4 EU/DL
UROBILINOGEN UR STRIP-ACNC: 4 EU/DL
WBC # BLD AUTO: 14.52 K/UL
WBC # FLD: 155 /CU MM
WBC #/AREA URNS AUTO: 3 /HPF (ref 0–5)

## 2017-10-19 PROCEDURE — 63600175 PHARM REV CODE 636 W HCPCS: Mod: NTX | Performed by: HOSPITALIST

## 2017-10-19 PROCEDURE — 99213 OFFICE O/P EST LOW 20 MIN: CPT | Mod: PBBFAC,TXP,25 | Performed by: INTERNAL MEDICINE

## 2017-10-19 PROCEDURE — 82962 GLUCOSE BLOOD TEST: CPT

## 2017-10-19 PROCEDURE — 83605 ASSAY OF LACTIC ACID: CPT | Mod: NTX

## 2017-10-19 PROCEDURE — 99285 EMERGENCY DEPT VISIT HI MDM: CPT | Mod: 25,27

## 2017-10-19 PROCEDURE — 99223 1ST HOSP IP/OBS HIGH 75: CPT | Mod: NTX,,, | Performed by: HOSPITALIST

## 2017-10-19 PROCEDURE — 25000003 PHARM REV CODE 250: Mod: NTX | Performed by: PHYSICIAN ASSISTANT

## 2017-10-19 PROCEDURE — 82042 OTHER SOURCE ALBUMIN QUAN EA: CPT | Mod: NTX

## 2017-10-19 PROCEDURE — 99214 OFFICE O/P EST MOD 30 MIN: CPT | Mod: S$PBB,TXP,, | Performed by: INTERNAL MEDICINE

## 2017-10-19 PROCEDURE — 87070 CULTURE OTHR SPECIMN AEROBIC: CPT | Mod: NTX

## 2017-10-19 PROCEDURE — 25000003 PHARM REV CODE 250: Mod: NTX | Performed by: HOSPITALIST

## 2017-10-19 PROCEDURE — 93010 ELECTROCARDIOGRAM REPORT: CPT | Mod: NTX,,, | Performed by: INTERNAL MEDICINE

## 2017-10-19 PROCEDURE — 82140 ASSAY OF AMMONIA: CPT | Mod: NTX

## 2017-10-19 PROCEDURE — 80053 COMPREHEN METABOLIC PANEL: CPT | Mod: 91,NTX

## 2017-10-19 PROCEDURE — 87075 CULTR BACTERIA EXCEPT BLOOD: CPT | Mod: NTX

## 2017-10-19 PROCEDURE — 96365 THER/PROPH/DIAG IV INF INIT: CPT

## 2017-10-19 PROCEDURE — 84484 ASSAY OF TROPONIN QUANT: CPT | Mod: NTX

## 2017-10-19 PROCEDURE — 99999 PR PBB SHADOW E&M-EST. PATIENT-LVL III: CPT | Mod: PBBFAC,TXP,, | Performed by: INTERNAL MEDICINE

## 2017-10-19 PROCEDURE — 84157 ASSAY OF PROTEIN OTHER: CPT | Mod: NTX

## 2017-10-19 PROCEDURE — 80320 DRUG SCREEN QUANTALCOHOLS: CPT | Mod: NTX

## 2017-10-19 PROCEDURE — 36415 COLL VENOUS BLD VENIPUNCTURE: CPT | Mod: NTX

## 2017-10-19 PROCEDURE — 87205 SMEAR GRAM STAIN: CPT | Mod: NTX

## 2017-10-19 PROCEDURE — 87070 CULTURE OTHR SPECIMN AEROBIC: CPT | Mod: 59,NTX

## 2017-10-19 PROCEDURE — 81003 URINALYSIS AUTO W/O SCOPE: CPT | Mod: NTX

## 2017-10-19 PROCEDURE — 89051 BODY FLUID CELL COUNT: CPT | Mod: NTX

## 2017-10-19 PROCEDURE — 80321 ALCOHOLS BIOMARKERS 1OR 2: CPT | Mod: 91,NTX

## 2017-10-19 PROCEDURE — 81001 URINALYSIS AUTO W/SCOPE: CPT | Mod: TXP

## 2017-10-19 PROCEDURE — 80307 DRUG TEST PRSMV CHEM ANLYZR: CPT | Mod: TXP

## 2017-10-19 PROCEDURE — 83735 ASSAY OF MAGNESIUM: CPT | Mod: 91,NTX

## 2017-10-19 PROCEDURE — 83735 ASSAY OF MAGNESIUM: CPT | Mod: NTX

## 2017-10-19 PROCEDURE — 20600001 HC STEP DOWN PRIVATE ROOM: Mod: NTX

## 2017-10-19 PROCEDURE — 99285 EMERGENCY DEPT VISIT HI MDM: CPT | Mod: NTX,,, | Performed by: PHYSICIAN ASSISTANT

## 2017-10-19 PROCEDURE — 84100 ASSAY OF PHOSPHORUS: CPT | Mod: NTX

## 2017-10-19 PROCEDURE — 85025 COMPLETE CBC W/AUTO DIFF WBC: CPT | Mod: NTX

## 2017-10-19 PROCEDURE — 80053 COMPREHEN METABOLIC PANEL: CPT | Mod: NTX

## 2017-10-19 PROCEDURE — 63600175 PHARM REV CODE 636 W HCPCS: Mod: NTX | Performed by: PHYSICIAN ASSISTANT

## 2017-10-19 PROCEDURE — 49083 ABD PARACENTESIS W/IMAGING: CPT

## 2017-10-19 PROCEDURE — 63600175 PHARM REV CODE 636 W HCPCS: Mod: NTX | Performed by: NURSE PRACTITIONER

## 2017-10-19 PROCEDURE — 87040 BLOOD CULTURE FOR BACTERIA: CPT | Mod: NTX

## 2017-10-19 PROCEDURE — 96366 THER/PROPH/DIAG IV INF ADDON: CPT

## 2017-10-19 RX ORDER — ONDANSETRON 8 MG/1
8 TABLET, ORALLY DISINTEGRATING ORAL EVERY 8 HOURS PRN
Status: DISCONTINUED | OUTPATIENT
Start: 2017-10-19 | End: 2017-10-19 | Stop reason: SDUPTHER

## 2017-10-19 RX ORDER — MAGNESIUM SULFATE HEPTAHYDRATE 40 MG/ML
2 INJECTION, SOLUTION INTRAVENOUS ONCE
Status: COMPLETED | OUTPATIENT
Start: 2017-10-19 | End: 2017-10-19

## 2017-10-19 RX ORDER — POTASSIUM CHLORIDE 20 MEQ/1
60 TABLET, EXTENDED RELEASE ORAL ONCE
Status: DISCONTINUED | OUTPATIENT
Start: 2017-10-19 | End: 2017-10-20

## 2017-10-19 RX ORDER — LACTULOSE 10 G/15ML
30 SOLUTION ORAL; RECTAL 2 TIMES DAILY
Refills: 3 | Status: ON HOLD | COMMUNITY
Start: 2017-10-08 | End: 2017-10-25 | Stop reason: HOSPADM

## 2017-10-19 RX ORDER — LACTULOSE 10 G/15ML
30 SOLUTION ORAL 3 TIMES DAILY
Status: DISCONTINUED | OUTPATIENT
Start: 2017-10-19 | End: 2017-10-20

## 2017-10-19 RX ORDER — IBUPROFEN 200 MG
16 TABLET ORAL
Status: DISCONTINUED | OUTPATIENT
Start: 2017-10-19 | End: 2017-10-25 | Stop reason: HOSPADM

## 2017-10-19 RX ORDER — ONDANSETRON 8 MG/1
8 TABLET, ORALLY DISINTEGRATING ORAL EVERY 8 HOURS PRN
Status: DISCONTINUED | OUTPATIENT
Start: 2017-10-19 | End: 2017-10-25 | Stop reason: HOSPADM

## 2017-10-19 RX ORDER — POTASSIUM CHLORIDE 7.45 MG/ML
10 INJECTION INTRAVENOUS
Status: COMPLETED | OUTPATIENT
Start: 2017-10-19 | End: 2017-10-20

## 2017-10-19 RX ORDER — POTASSIUM CHLORIDE 20 MEQ/1
1 TABLET, EXTENDED RELEASE ORAL 2 TIMES DAILY
Status: ON HOLD | COMMUNITY
Start: 2017-10-12 | End: 2017-11-15

## 2017-10-19 RX ORDER — FUROSEMIDE 40 MG/1
40 TABLET ORAL DAILY
COMMUNITY
End: 2017-10-19 | Stop reason: SINTOL

## 2017-10-19 RX ORDER — POTASSIUM CHLORIDE 7.45 MG/ML
10 INJECTION INTRAVENOUS
Status: DISPENSED | OUTPATIENT
Start: 2017-10-19 | End: 2017-10-19

## 2017-10-19 RX ORDER — IBUPROFEN 200 MG
24 TABLET ORAL
Status: DISCONTINUED | OUTPATIENT
Start: 2017-10-19 | End: 2017-10-25 | Stop reason: HOSPADM

## 2017-10-19 RX ORDER — LIDOCAINE HYDROCHLORIDE 10 MG/ML
5 INJECTION INFILTRATION; PERINEURAL
Status: COMPLETED | OUTPATIENT
Start: 2017-10-19 | End: 2017-10-19

## 2017-10-19 RX ORDER — POTASSIUM CHLORIDE 7.45 MG/ML
10 INJECTION INTRAVENOUS
Status: DISCONTINUED | OUTPATIENT
Start: 2017-10-19 | End: 2017-10-19 | Stop reason: SDUPTHER

## 2017-10-19 RX ORDER — PANTOPRAZOLE SODIUM 40 MG/1
40 TABLET, DELAYED RELEASE ORAL DAILY
Status: DISCONTINUED | OUTPATIENT
Start: 2017-10-20 | End: 2017-10-25 | Stop reason: HOSPADM

## 2017-10-19 RX ORDER — GLUCAGON 1 MG
1 KIT INJECTION
Status: DISCONTINUED | OUTPATIENT
Start: 2017-10-19 | End: 2017-10-25 | Stop reason: HOSPADM

## 2017-10-19 RX ORDER — CETIRIZINE HYDROCHLORIDE 10 MG/1
10 TABLET ORAL DAILY
Status: DISCONTINUED | OUTPATIENT
Start: 2017-10-20 | End: 2017-10-25 | Stop reason: HOSPADM

## 2017-10-19 RX ORDER — ACETAMINOPHEN 325 MG/1
650 TABLET ORAL EVERY 4 HOURS PRN
Status: DISCONTINUED | OUTPATIENT
Start: 2017-10-19 | End: 2017-10-25 | Stop reason: HOSPADM

## 2017-10-19 RX ADMIN — CEFTRIAXONE 1 G: 1 INJECTION, SOLUTION INTRAVENOUS at 01:10

## 2017-10-19 RX ADMIN — POTASSIUM CHLORIDE 10 MEQ: 10 INJECTION, SOLUTION INTRAVENOUS at 10:10

## 2017-10-19 RX ADMIN — ONDANSETRON 8 MG: 8 TABLET, ORALLY DISINTEGRATING ORAL at 09:10

## 2017-10-19 RX ADMIN — MAGNESIUM SULFATE IN WATER 2 G: 40 INJECTION, SOLUTION INTRAVENOUS at 09:10

## 2017-10-19 RX ADMIN — POTASSIUM CHLORIDE 10 MEQ: 10 INJECTION, SOLUTION INTRAVENOUS at 11:10

## 2017-10-19 RX ADMIN — LIDOCAINE HYDROCHLORIDE 5 ML: 10 INJECTION, SOLUTION INFILTRATION; PERINEURAL at 01:10

## 2017-10-19 RX ADMIN — SODIUM CHLORIDE 500 ML: 900 INJECTION, SOLUTION INTRAVENOUS at 01:10

## 2017-10-19 RX ADMIN — POTASSIUM CHLORIDE 10 MEQ: 10 INJECTION, SOLUTION INTRAVENOUS at 06:10

## 2017-10-19 RX ADMIN — LACTULOSE 30 G: 20 SOLUTION ORAL at 11:10

## 2017-10-19 NOTE — ED NOTES
Pt given a specimen cup for urine collection. Pt states she went to the restroom before coming and does not have to urinate at present.

## 2017-10-19 NOTE — HPI
This is a 53 y/o female with hx of cirrhosis, previously attributed to PERKINS but suspicion for etoh component as well, complicated by small EV, ascites who presents as admission from Dr. Souza in Hepatology clinic for decompensated cirrhosis.  Noted to have hypokalemia and elevated WBC in clinic thus sent to ER.   Went to clinic and states she has been otherwise in her USOH. She has no complaints this morning.   Pt reports symptoms of fatigue, poor appetite, abdominal distention, abd pain, constipation, problems urinating and generalized weakness.    Has told other providers that she has not had a drink in last 6 months, however tells me that she has not had a drink in at least 1 year.  She states she used to drink daily with a friend, but since that friend has passed away, she has not drank. She states she isnt sure how her drinking caused liver disease because they never drank 'that much'    Records reports EGD in August 2016 that showed small esophageal varices with severe gastritis.    Colonoscopy 2015 with 2 polyps and internal hemorrhoids  In the ER, tap was negative for SBP.    US doppler performed as well:  Decreased Doppler signal in the distal main portal vein and left and right portal veins which may reflect slow flow with no definite thrombus visualized.  Three-phase contrast-enhanced CT may be performed for further evaluation of the portal venous system as warranted.  Findings compatible cirrhosis and portal hypertension, including nodular heterogeneous liver and large volume of ascites.  Status post cholecystectomy.

## 2017-10-19 NOTE — LETTER
October 19, 2017        Andrew Bradshaw  [address]  [facility]  [facility] [address]  Phone: [phone]  Fax: [phone]             Dane Morales - Liver Transplant  [address]  [facility] [address]  Phone: [phone]   Patient: Marcie Bazan   MR Number: [id number]   YOB: 1965   Date of Visit: 10/19/2017       Dear Dr. Andrew Bradshaw    Thank you for referring Marcie Bazan to me for evaluation. Attached you will find relevant portions of my assessment and plan of care.    If you have questions, please do not hesitate to call me. I look forward to following Marcie Bazan along with you.    Sincerely,    Joselin Souza MD    Enclosure    If you would like to receive this communication electronically, please contact [email or web address] or [phone] to request [employer] access.    [employer] is a tool which provides read-only access to select patient information with whom you have a relationship. Its easy to use and provides real time access to review your patients record including encounter summaries, notes, results, and demographic information.    If you feel you have received this communication in error or would no longer like to receive these types of communications, please e-mail [email or web address]

## 2017-10-19 NOTE — PROGRESS NOTES
Subjective:     Marcie Bazan is here for evaluation of Liver Transplant Pre-evaluation      HPI  Marcie Bazan has cirrhosis reported 2/2 [place], but seems most c/w alcohol.  She reports a previous history of heavy alcohol use and denies any significant issues with diabetes, hypercholesterolemia or significant obesity.  She was being managed by my colleague at [facility].  Per the notes her meld score early this year was 15 and then jumped to 25 and is now 28.  She denies any recent alcohol use and denies any alcohol intake in the past 6 months.  She does have massive ascites at this time.  She continues have issues with hypokalemia as well.  She's been intolerant of the liquid potassium.  She was recently prescribed by mouth formulation but has not started yet.  She is not taking Lasix.  She is taking the Aldactone.  She denies any overt encephalopathy but does have delayed cognition.  No gastric intestinal bleeding.    Records reports EGD in August 2016 that showed small esophageal varices with severe gastritis.  Colonoscopy 2015 with 2 polyps and internal hemorrhoids    Review of Systems   Constitutional: Positive for activity change (decreased), appetite change (decreased) and fatigue.   HENT: Negative.    Eyes: Negative.    Respiratory: Negative.    Cardiovascular: Negative.    Gastrointestinal: Positive for abdominal distention and nausea.   Genitourinary: Negative.    Musculoskeletal: Positive for arthralgias and myalgias.   Skin: Positive for color change.   Neurological: Positive for weakness.   Psychiatric/Behavioral: Positive for decreased concentration.       Objective:     Physical Exam   Constitutional: She is oriented to person, place, and time. No distress.   Thin with muscle wasting   HENT:   Head: Normocephalic and atraumatic.   Mouth/Throat: Oropharynx is clear and moist. No oropharyngeal exudate.   Eyes: Conjunctivae and EOM are normal. Pupils are equal, round, and reactive to light. Left eye  exhibits no discharge. Scleral icterus is present.   Neck: Normal range of motion. Neck supple. No thyromegaly present.   Cardiovascular: Normal rate, regular rhythm and normal heart sounds.    Pulmonary/Chest: Effort normal. No respiratory distress. She has no wheezes. She exhibits no tenderness.   Decrease BS at the bases   Abdominal: Soft. Bowel sounds are normal. She exhibits distension (large volume ascites). There is no tenderness. There is no rebound.   Musculoskeletal: Normal range of motion. She exhibits no edema or tenderness.   Lymphadenopathy:     She has no cervical adenopathy.   Neurological: She is alert and oriented to person, place, and time.   Skin: No rash noted. No erythema. No pallor.   Psychiatric:   Responds appropriately but slight delays in cognition   Vitals reviewed.      MELD-Na score: 28 at 10/19/2017  9:13 AM  MELD score: 25 at 10/19/2017  9:13 AM  Calculated from:  Serum Creatinine: 0.9 mg/dL (Rounded to 1) at 10/19/2017  9:13 AM  Serum Sodium: 130 mmol/L at 10/19/2017  9:13 AM  Total Bilirubin: 13.0 mg/dL at 10/19/2017  9:13 AM  INR(ratio): 2.2 at 10/19/2017  9:13 AM  Age: 52 years    WBC   Date Value Ref Range Status   10/19/2017 14.06 (H) 3.90 - 12.70 K/uL Final     Hemoglobin   Date Value Ref Range Status   10/19/2017 11.0 (L) 12.0 - 16.0 g/dL Final     Hematocrit   Date Value Ref Range Status   10/19/2017 31.5 (L) 37.0 - 48.5 % Final     Platelets   Date Value Ref Range Status   10/19/2017 145 (L) 150 - 350 K/uL Final     BUN, Bld   Date Value Ref Range Status   10/19/2017 9 6 - 20 mg/dL Final     Creatinine   Date Value Ref Range Status   10/19/2017 0.9 0.5 - 1.4 mg/dL Final     Glucose   Date Value Ref Range Status   10/19/2017 119 (H) 70 - 110 mg/dL Final     Calcium   Date Value Ref Range Status   10/19/2017 9.0 8.7 - 10.5 mg/dL Final     Sodium   Date Value Ref Range Status   10/19/2017 130 (L) 136 - 145 mmol/L Final     Potassium   Date Value Ref Range Status   10/19/2017  2.8 (L) 3.5 - 5.1 mmol/L Final     Chloride   Date Value Ref Range Status   10/19/2017 91 (L) 95 - 110 mmol/L Final     Magnesium   Date Value Ref Range Status   09/14/2017 1.8 1.6 - 2.6 mg/dL Final     AST   Date Value Ref Range Status   10/19/2017 75 (H) 10 - 40 U/L Final     ALT   Date Value Ref Range Status   10/19/2017 24 10 - 44 U/L Final     Alkaline Phosphatase   Date Value Ref Range Status   10/19/2017 166 (H) 55 - 135 U/L Final     Total Bilirubin   Date Value Ref Range Status   10/19/2017 13.0 (H) 0.1 - 1.0 mg/dL Final     Comment:     For infants and newborns, interpretation of results should be based  on gestational age, weight and in agreement with clinical  observations.  Premature Infant recommended reference ranges:  Up to 24 hours.............<8.0 mg/dL  Up to 48 hours............<12.0 mg/dL  3-5 days..................<15.0 mg/dL  6-29 days.................<15.0 mg/dL       Albumin   Date Value Ref Range Status   10/19/2017 2.2 (L) 3.5 - 5.2 g/dL Final     INR   Date Value Ref Range Status   10/19/2017 2.2 (H) 0.8 - 1.2 Final     Comment:     Coumadin Therapy:  2.0 - 3.0 for INR for all indicators except mechanical heart valves  and antiphospholipid syndromes which should use 2.5 - 3.5.           Assessment/Plan:     1. Cirrhosis of liver with ascites, unspecified hepatic cirrhosis type    2. Organ transplant candidate    3. Liver masses    4. Iron deficiency anemia due to chronic blood loss    5. Secondary esophageal varices without bleeding    6. Hypokalemia      Marciejohn Bazan is a 52 y.o. female withLiver Transplant Pre-evaluation    Cirrhosis of liver with ascites, unspecified hepatic cirrhosis type-severe liver decompensation with elevated meld score and concern for subtle encephalopathy as well as ascites.  Given her increasing meld, ascites, hypokalemia, encephalopathy and increased white count I think she needs inpatient admission and evaluation.  -Patient sent to emergency department.   Transplant team aware as well as ED.  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

## 2017-10-19 NOTE — ED PROVIDER NOTES
Encounter Date: 10/19/2017    SCRIBE #1 NOTE: I, Angie Epstein, am scribing for, and in the presence of,  Dr. Bhatti. I have scribed the following portions of the note - the APC attestation. Other sections scribed: CXR Reading.       History     Chief Complaint   Patient presents with    Abnormal Lab     WBC elevated; Potassium low     53 y/o AAF with PMHx of HTN and cirrhosis presents to the ED for admission. She was seen in the liver transplant clinic for eval for liver transplant. She was noted to have hypokalemia and leukocytosis so was sent to the ED for further evaluation. She reports fatigue, decreased appetite, chills, abdominal distention, abdominal pain, constipation, difficulty urinating, lightheadedness. She has PRN paracentesis (last in early October). She reports that she is taking lactulose. She is on potassium supplementation but is unable to tolerate liquid potassium (causes emesis), she was changed to K tablets today (has not yet filled this). She denies fever, chest pain, diarrhea, dysuria, LE edema. She occasionally drinks alcohol and denies tobacco or drug use.      The history is provided by the patient and medical records.     Review of patient's allergies indicates:   Allergen Reactions    Ferrous sulfate Other (See Comments)     Patient states the pill makes her sick. She stated she would rather have a shot     Past Medical History:   Diagnosis Date    Hypertension     Liver cirrhosis      Past Surgical History:   Procedure Laterality Date    BREAST SURGERY      CHOLECYSTECTOMY      HYSTERECTOMY       Family History   Problem Relation Age of Onset    Hypertension Mother     Hypertension Father     Diabetes Father     Diabetes Sister     Depression Maternal Grandfather      Social History   Substance Use Topics    Smoking status: Never Smoker    Smokeless tobacco: Never Used    Alcohol use Yes      Comment: occasionally     Review of Systems   Constitutional: Positive for  appetite change (decreased), chills and fatigue. Negative for diaphoresis and fever.   HENT: Negative for congestion, rhinorrhea and sore throat.    Eyes: Negative for photophobia and visual disturbance.        +scleral icterus   Respiratory: Positive for shortness of breath. Negative for cough.    Cardiovascular: Negative for chest pain, palpitations and leg swelling.   Gastrointestinal: Positive for abdominal distention, abdominal pain, constipation and nausea. Negative for diarrhea and vomiting.   Genitourinary: Positive for decreased urine volume. Negative for dysuria and hematuria.   Musculoskeletal: Negative for back pain, neck pain and neck stiffness.   Skin: Negative for rash and wound.   Neurological: Positive for light-headedness. Negative for dizziness, syncope, speech difficulty, weakness, numbness and headaches.   Psychiatric/Behavioral: Negative for confusion.       Physical Exam     Initial Vitals [10/19/17 1149]   BP Pulse Resp Temp SpO2   131/73 102 18 98 °F (36.7 °C) 100 %      MAP       92.33         Physical Exam    Nursing note and vitals reviewed.  Constitutional: She appears well-developed and well-nourished. She is not diaphoretic. No distress.   HENT:   Head: Normocephalic and atraumatic.   Eyes: Scleral icterus is present.   Neck: Normal range of motion. Neck supple.   Cardiovascular: Regular rhythm and normal heart sounds. Tachycardia present.  Exam reveals no gallop and no friction rub.    No murmur heard.  No LE edema   Pulmonary/Chest: Breath sounds normal. She has no wheezes. She has no rales.   Crackles in lower lungs   Abdominal: Soft. Bowel sounds are normal. She exhibits distension. There is tenderness. There is no rigidity, no rebound, no guarding and no CVA tenderness.   Musculoskeletal: Normal range of motion.   Neurological: She is alert and oriented to person, place, and time.   Skin: Skin is warm and dry. No rash noted. No erythema.   Psychiatric: She has a normal mood and  affect.         ED Course   Paracentesis  Date/Time: 10/19/2017 2:18 PM  Performed by: SHAUNA ANDINO  Authorized by: JAMES RM   Consent Done: Yes  Consent: Written consent obtained.  Consent given by: patient  Patient understanding: patient states understanding of the procedure being performed  Site marked: the operative site was marked  Initial or subsequent exam: initial  Procedure purpose: diagnostic  Indications: abdominal discomfort secondary to ascites  Anesthesia: local infiltration    Anesthesia:  Local Anesthetic: lidocaine 1% with epinephrine  Patient sedated: no  Preparation: Patient was prepped and draped in the usual sterile fashion.  Description of findings: Straw colored serosnagiunus fluid.    Needle gauge: 18  Ultrasound guidance: yes  Puncture site: right lower quadrant  Fluid appearance: serosanguinous  Dressing: 4x4 sterile gauze  Complications: No  Estimated blood loss (mL): 0  Patient tolerance: Patient tolerated the procedure well with no immediate complications        Labs Reviewed   CBC W/ AUTO DIFFERENTIAL - Abnormal; Notable for the following:        Result Value    WBC 14.52 (*)     Hemoglobin 11.8 (*)     Hematocrit 34.2 (*)     MCV 75 (*)     MCH 25.8 (*)     RDW 26.5 (*)     Immature Granulocytes 0.9 (*)     Gran # 10.9 (*)     Immature Grans (Abs) 0.13 (*)     Mono # 1.2 (*)     Gran% 75.2 (*)     Lymph% 14.1 (*)     All other components within normal limits   COMPREHENSIVE METABOLIC PANEL - Abnormal; Notable for the following:     Sodium 129 (*)     Potassium 2.7 (*)     Chloride 88 (*)     Glucose 115 (*)     Albumin 2.5 (*)     Total Bilirubin 14.5 (*)     Alkaline Phosphatase 195 (*)     AST 74 (*)     eGFR if non  57.1 (*)     All other components within normal limits   LACTIC ACID, PLASMA - Abnormal; Notable for the following:     Lactate (Lactic Acid) 2.8 (*)     All other components within normal limits   AMMONIA - Abnormal; Notable for the  following:     Ammonia 63 (*)     All other components within normal limits   MAGNESIUM - Abnormal; Notable for the following:     Magnesium 1.1 (*)     All other components within normal limits   CULTURE, BLOOD   CULTURE, BLOOD   CULTURE, BODY FLUID - BACTEC   GRAM STAIN   PHOSPHORUS   ALCOHOL,MEDICAL (ETHANOL)   URINALYSIS, REFLEX TO URINE CULTURE   WBC & DIFF,BODY FLUID         Imaging Results          X-Ray Chest PA And Lateral (Final result)  Result time 10/19/17 13:25:58    Final result by Barron Zelaya MD (10/19/17 13:25:58)                 Impression:         Bilateral basilar subsegmental atelectasis or scarring, no large focal consolidation..          Electronically signed by: BARRON ZELAYA MD  Date:     10/19/17  Time:    13:25              Narrative:    Chest PA and lateral    Indication:Shortness of breath    Comparison:9/18/2017    Findings:  The cardiomediastinal silhouette is not enlarged, noting calcification of the aortic arch.  There is no pleural effusion.  The trachea is midline.  The lungs are symmetrically expanded bilaterally with bilateral bandlike increased interstitial attenuation projects over the lower lung zones, right greater than left, suggesting atelectasis and/or scarring. No large focal consolidation seen.  There is no pneumothorax.  The osseous structures are remarkable for degenerative changes of the spine and shoulders.                              X-Rays:   Independently Interpreted Readings:   Chest X-Ray: No large consolidation. Some bilateral atelectasis.      Medical Decision Making:   History:   Old Medical Records: I decided to obtain old medical records.  Old Records Summarized: records from clinic visits.       <> Summary of Records: Seen in liver transplant clinic today (Dr Souza). Per note:   Cirrhosis of liver with ascites, unspecified hepatic cirrhosis type-severe liver decompensation with elevated meld score and concern for subtle encephalopathy as well as  ascites.  Given her increasing meld, ascites, hypokalemia, encephalopathy and increased white count I think she needs inpatient admission and evaluation.  -Patient sent to emergency department.  Transplant team aware as well as ED.  -Needs diagnostic and therapeutic paracentesis  -Panculture and chest x-ray  -Recommend inpatient evaluation for liver transplantation  -Concern about EtOH history will need addiction psych as part of transplant eval  Independently Interpreted Test(s):   I have ordered and independently interpreted X-rays - see prior notes.  Clinical Tests:   Lab Tests: Ordered and Reviewed  Radiological Study: Ordered and Reviewed       APC / Resident Notes:   51 y/o AAF with PMHx of HTN and cirrhosis presents to the ED for admission. Tachycardic. Regular rhythm. Crackles noted in the lower lungs. Abdomen soft but distended and tender. No LE edema. Scleral icterus noted. Outpatient labs reviewed: K 2.8, WBC 14.06, PT/INR 21.9/2.2. Will get labs and CXR.    Spoke with liver transplant. Admit to medicine with consult them for inpatient transplant eval.    CBC shows leukocytosis with WBC 14.52. Hypokalemia noted with K 2.7. 10mEq IV ordered, patient unable to tolerate liquid K replacement. T bili 14.5, Alk Phos 195, AST 74, ALT 29. Lactic acid 2.8. Ammonia 63. Mag 1.1 and Phos 3.4. Blood culture pending.    Diagnostic paracentesis done in the ED and fluid sent to the lab. Will start IV rocephin.    CXR shows bilateral basilar subsegmental atelectasis or scarring. No focal consolidation.     Will admit to internal medicine for therapeutic paracentesis, IV abx and inpatient workup for liver transplant.        Scribe Attestation:   Scribe #1: I performed the above scribed service and the documentation accurately describes the services I performed. I attest to the accuracy of the note.    Attending Attestation:     Physician Attestation Statement for NP/PA:   I have conducted a face to face encounter with this  patient in addition to the NP/PA, due to Medical Complexity    Other NP/PA Attestation Additions:      Medical Decision Makin y.o. female presenting with leukocytosis and abdominal pain. Pt has history of cirrhosis. Will preform diagnostic tap to rule out SBP. \    Paracentesis performed with yellow fluid.  Will send for analysis.  Will treat with rocephin.  Pt will be admitted for further treatment and evaluation                 ED Course      Clinical Impression:   The primary encounter diagnosis was Leukocytosis, unspecified type. Diagnoses of Other ascites, Abdominal pain, unspecified abdominal location, Hypokalemia, and Decompensated hepatic cirrhosis were also pertinent to this visit.    Disposition:   Disposition: Admitted  Condition: Ibeth Bhatti MD  10/19/17 1441       DEMETRIUS Jones-C  10/19/17 1218

## 2017-10-19 NOTE — ED NOTES
Patient identifiers verified and correct for Marcie Bazan.    LOC: The patient is awake, alert and aware of environment with an appropriate affect, the patient is oriented x 3 and speaking appropriately.  APPEARANCE: Patient resting comfortably and in no acute distress, patient is clean and well groomed, patient's clothing is properly fastened, sclera yellow.  SKIN: The skin is warm and dry, yellow tinge noted to skin, patient has normal skin turgor and moist mucus membranes, skin intact, no breakdown or bruising noted.  MUSCULOSKELETAL: Patient moving all extremities spontaneously, no obvious swelling or deformities noted.  RESPIRATORY: Airway is open and patent, respirations are spontaneous, patient has a normal effort and rate, no accessory muscle use noted, bilateral breath sounds CTA.  CARDIAC: Patient has a normal rate and regular rhythm, no peripheral edema noted, capillary refill < 3 seconds.  ABDOMEN: Firm and tender to palpation in epigastric region, distention noted.  NEUROLOGIC: Eyes open spontaneously, behavior appropriate to situation, follows commands, facial expression symmetrical, bilateral hand grasp equal and even, purposeful motor response noted, normal sensation in all extremities when touched with a finger.

## 2017-10-20 PROBLEM — K76.82 HEPATIC ENCEPHALOPATHY: Status: ACTIVE | Noted: 2017-10-20

## 2017-10-20 LAB
AFP SERPL-MCNC: 40 NG/ML
ALBUMIN SERPL BCP-MCNC: 2 G/DL
ALP SERPL-CCNC: 153 U/L
ALT SERPL W/O P-5'-P-CCNC: 23 U/L
AMMONIA PLAS-SCNC: 73 UMOL/L
AMPHET+METHAMPHET UR QL: NEGATIVE
ANION GAP SERPL CALC-SCNC: 11 MMOL/L
AST SERPL-CCNC: 57 U/L
BARBITURATES UR QL SCN>200 NG/ML: NEGATIVE
BASOPHILS # BLD AUTO: 0.06 K/UL
BASOPHILS NFR BLD: 0.4 %
BENZODIAZ UR QL SCN>200 NG/ML: NEGATIVE
BILIRUB SERPL-MCNC: 11.8 MG/DL
BUN SERPL-MCNC: 11 MG/DL
BZE UR QL SCN: NEGATIVE
CALCIUM SERPL-MCNC: 8.1 MG/DL
CANNABINOIDS UR QL SCN: NEGATIVE
CERULOPLASMIN SERPL-MCNC: 18 MG/DL
CHLORIDE SERPL-SCNC: 94 MMOL/L
CO2 SERPL-SCNC: 24 MMOL/L
CREAT SERPL-MCNC: 0.9 MG/DL
CREAT UR-MCNC: 160 MG/DL
DIFFERENTIAL METHOD: ABNORMAL
EOSINOPHIL # BLD AUTO: 0.3 K/UL
EOSINOPHIL NFR BLD: 2.4 %
ERYTHROCYTE [DISTWIDTH] IN BLOOD BY AUTOMATED COUNT: 25.7 %
EST. GFR  (AFRICAN AMERICAN): >60 ML/MIN/1.73 M^2
EST. GFR  (NON AFRICAN AMERICAN): >60 ML/MIN/1.73 M^2
ESTIMATED AVG GLUCOSE: ABNORMAL MG/DL
ETHANOL UR-MCNC: <10 MG/DL
FERRITIN SERPL-MCNC: 416 NG/ML
GLUCOSE SERPL-MCNC: 109 MG/DL
HBA1C MFR BLD HPLC: <4 %
HBV CORE AB SERPL QL IA: NEGATIVE
HBV SURFACE AG SERPL QL IA: NEGATIVE
HCT VFR BLD AUTO: 28.5 %
HCV AB SERPL QL IA: NEGATIVE
HGB BLD-MCNC: 10.1 G/DL
HIV 1+2 AB+HIV1 P24 AG SERPL QL IA: NEGATIVE
IMM GRANULOCYTES # BLD AUTO: 0.08 K/UL
IMM GRANULOCYTES NFR BLD AUTO: 0.6 %
INR PPP: 2
LACTATE SERPL-SCNC: 1.8 MMOL/L
LYMPHOCYTES # BLD AUTO: 1.9 K/UL
LYMPHOCYTES NFR BLD: 13.9 %
MAGNESIUM SERPL-MCNC: 1.2 MG/DL
MCH RBC QN AUTO: 26.5 PG
MCHC RBC AUTO-ENTMCNC: 35.4 G/DL
MCV RBC AUTO: 75 FL
METHADONE UR QL SCN>300 NG/ML: NEGATIVE
MONOCYTES # BLD AUTO: 1.3 K/UL
MONOCYTES NFR BLD: 9.7 %
NEUTROPHILS # BLD AUTO: 9.9 K/UL
NEUTROPHILS NFR BLD: 73 %
NRBC BLD-RTO: 0 /100 WBC
OPIATES UR QL SCN: NEGATIVE
PCP UR QL SCN>25 NG/ML: NEGATIVE
PHOSPHATE SERPL-MCNC: 2.9 MG/DL
PLATELET # BLD AUTO: 136 K/UL
PMV BLD AUTO: 9.9 FL
POCT GLUCOSE: 120 MG/DL (ref 70–110)
POCT GLUCOSE: 123 MG/DL (ref 70–110)
POCT GLUCOSE: 134 MG/DL (ref 70–110)
POCT GLUCOSE: 142 MG/DL (ref 70–110)
POCT GLUCOSE: 147 MG/DL (ref 70–110)
POTASSIUM SERPL-SCNC: 3 MMOL/L
PREALB SERPL-MCNC: 3 MG/DL
PROT SERPL-MCNC: 6.6 G/DL
PROTHROMBIN TIME: 20.1 SEC
RBC # BLD AUTO: 3.81 M/UL
SODIUM SERPL-SCNC: 129 MMOL/L
TOXICOLOGY INFORMATION: NORMAL
TROPONIN I SERPL DL<=0.01 NG/ML-MCNC: <0.006 NG/ML
TSH SERPL DL<=0.005 MIU/L-ACNC: 1.77 UIU/ML
WBC # BLD AUTO: 13.56 K/UL

## 2017-10-20 PROCEDURE — 82784 ASSAY IGA/IGD/IGG/IGM EACH: CPT | Mod: 59,NTX

## 2017-10-20 PROCEDURE — 80053 COMPREHEN METABOLIC PANEL: CPT | Mod: NTX

## 2017-10-20 PROCEDURE — 63600175 PHARM REV CODE 636 W HCPCS: Mod: NTX | Performed by: HOSPITALIST

## 2017-10-20 PROCEDURE — 97161 PT EVAL LOW COMPLEX 20 MIN: CPT | Mod: NTX

## 2017-10-20 PROCEDURE — 86704 HEP B CORE ANTIBODY TOTAL: CPT | Mod: NTX

## 2017-10-20 PROCEDURE — 20600001 HC STEP DOWN PRIVATE ROOM: Mod: NTX

## 2017-10-20 PROCEDURE — 85610 PROTHROMBIN TIME: CPT | Mod: NTX

## 2017-10-20 PROCEDURE — 25500020 PHARM REV CODE 255: Mod: NTX | Performed by: HOSPITALIST

## 2017-10-20 PROCEDURE — 99232 SBSQ HOSP IP/OBS MODERATE 35: CPT | Mod: AF,HB,NTX, | Performed by: PSYCHIATRY & NEUROLOGY

## 2017-10-20 PROCEDURE — 80307 DRUG TEST PRSMV CHEM ANLYZR: CPT | Mod: NTX

## 2017-10-20 PROCEDURE — 63600175 PHARM REV CODE 636 W HCPCS: Mod: NTX | Performed by: NURSE PRACTITIONER

## 2017-10-20 PROCEDURE — 82728 ASSAY OF FERRITIN: CPT | Mod: NTX

## 2017-10-20 PROCEDURE — 86803 HEPATITIS C AB TEST: CPT | Mod: NTX

## 2017-10-20 PROCEDURE — 84100 ASSAY OF PHOSPHORUS: CPT | Mod: NTX

## 2017-10-20 PROCEDURE — 82390 ASSAY OF CERULOPLASMIN: CPT | Mod: NTX

## 2017-10-20 PROCEDURE — 82104 ALPHA-1-ANTITRYPSIN PHENO: CPT | Mod: NTX

## 2017-10-20 PROCEDURE — 82977 ASSAY OF GGT: CPT | Mod: NTX

## 2017-10-20 PROCEDURE — 97802 MEDICAL NUTRITION INDIV IN: CPT | Mod: NTX

## 2017-10-20 PROCEDURE — 84484 ASSAY OF TROPONIN QUANT: CPT | Mod: NTX

## 2017-10-20 PROCEDURE — 87086 URINE CULTURE/COLONY COUNT: CPT | Mod: NTX

## 2017-10-20 PROCEDURE — 86644 CMV ANTIBODY: CPT | Mod: NTX

## 2017-10-20 PROCEDURE — 99233 SBSQ HOSP IP/OBS HIGH 50: CPT | Mod: NTX,,, | Performed by: INTERNAL MEDICINE

## 2017-10-20 PROCEDURE — 85025 COMPLETE CBC W/AUTO DIFF WBC: CPT | Mod: NTX

## 2017-10-20 PROCEDURE — 83735 ASSAY OF MAGNESIUM: CPT | Mod: NTX

## 2017-10-20 PROCEDURE — 84134 ASSAY OF PREALBUMIN: CPT | Mod: NTX

## 2017-10-20 PROCEDURE — 87340 HEPATITIS B SURFACE AG IA: CPT | Mod: NTX

## 2017-10-20 PROCEDURE — 25000003 PHARM REV CODE 250: Mod: NTX | Performed by: HOSPITALIST

## 2017-10-20 PROCEDURE — 86703 HIV-1/HIV-2 1 RESULT ANTBDY: CPT | Mod: NTX

## 2017-10-20 PROCEDURE — 82248 BILIRUBIN DIRECT: CPT | Mod: NTX

## 2017-10-20 PROCEDURE — 83036 HEMOGLOBIN GLYCOSYLATED A1C: CPT | Mod: NTX

## 2017-10-20 PROCEDURE — 86038 ANTINUCLEAR ANTIBODIES: CPT | Mod: NTX

## 2017-10-20 PROCEDURE — 36415 COLL VENOUS BLD VENIPUNCTURE: CPT | Mod: NTX

## 2017-10-20 PROCEDURE — 97530 THERAPEUTIC ACTIVITIES: CPT | Mod: NTX

## 2017-10-20 PROCEDURE — 83605 ASSAY OF LACTIC ACID: CPT | Mod: NTX

## 2017-10-20 PROCEDURE — 97166 OT EVAL MOD COMPLEX 45 MIN: CPT | Mod: NTX

## 2017-10-20 PROCEDURE — 86256 FLUORESCENT ANTIBODY TITER: CPT | Mod: NTX

## 2017-10-20 PROCEDURE — 84443 ASSAY THYROID STIM HORMONE: CPT | Mod: NTX

## 2017-10-20 PROCEDURE — 82140 ASSAY OF AMMONIA: CPT | Mod: NTX

## 2017-10-20 PROCEDURE — 82105 ALPHA-FETOPROTEIN SERUM: CPT | Mod: NTX

## 2017-10-20 PROCEDURE — 99233 SBSQ HOSP IP/OBS HIGH 50: CPT | Mod: NTX,,, | Performed by: HOSPITALIST

## 2017-10-20 PROCEDURE — 25500020 PHARM REV CODE 255: Mod: NTX | Performed by: STUDENT IN AN ORGANIZED HEALTH CARE EDUCATION/TRAINING PROGRAM

## 2017-10-20 PROCEDURE — 86592 SYPHILIS TEST NON-TREP QUAL: CPT | Mod: NTX

## 2017-10-20 PROCEDURE — 83540 ASSAY OF IRON: CPT | Mod: NTX

## 2017-10-20 PROCEDURE — 0W9G3ZZ DRAINAGE OF PERITONEAL CAVITY, PERCUTANEOUS APPROACH: ICD-10-PCS | Performed by: RADIOLOGY

## 2017-10-20 RX ORDER — MAGNESIUM SULFATE HEPTAHYDRATE 40 MG/ML
4 INJECTION, SOLUTION INTRAVENOUS ONCE
Status: COMPLETED | OUTPATIENT
Start: 2017-10-20 | End: 2017-10-20

## 2017-10-20 RX ORDER — SPIRONOLACTONE 100 MG/1
100 TABLET, FILM COATED ORAL DAILY
Status: ON HOLD | COMMUNITY
End: 2017-10-25 | Stop reason: HOSPADM

## 2017-10-20 RX ORDER — SPIRONOLACTONE 100 MG/1
100 TABLET, FILM COATED ORAL DAILY
Status: DISCONTINUED | OUTPATIENT
Start: 2017-10-20 | End: 2017-10-21

## 2017-10-20 RX ORDER — LACTULOSE 10 G/15ML
30 SOLUTION ORAL EVERY 6 HOURS
Status: DISCONTINUED | OUTPATIENT
Start: 2017-10-20 | End: 2017-10-20

## 2017-10-20 RX ORDER — FUROSEMIDE 40 MG/1
40 TABLET ORAL DAILY
Status: DISCONTINUED | OUTPATIENT
Start: 2017-10-20 | End: 2017-10-22

## 2017-10-20 RX ORDER — LACTULOSE 10 G/15ML
15 SOLUTION ORAL 3 TIMES DAILY
Status: DISCONTINUED | OUTPATIENT
Start: 2017-10-21 | End: 2017-10-22

## 2017-10-20 RX ADMIN — LACTULOSE 30 G: 20 SOLUTION ORAL at 05:10

## 2017-10-20 RX ADMIN — CETIRIZINE HYDROCHLORIDE 10 MG: 10 TABLET, FILM COATED ORAL at 08:10

## 2017-10-20 RX ADMIN — PANTOPRAZOLE SODIUM 40 MG: 40 TABLET, DELAYED RELEASE ORAL at 08:10

## 2017-10-20 RX ADMIN — IOHEXOL 75 ML: 350 INJECTION, SOLUTION INTRAVENOUS at 05:10

## 2017-10-20 RX ADMIN — MAGNESIUM SULFATE IN WATER 4 G: 40 INJECTION, SOLUTION INTRAVENOUS at 11:10

## 2017-10-20 RX ADMIN — POTASSIUM CHLORIDE 10 MEQ: 10 INJECTION, SOLUTION INTRAVENOUS at 01:10

## 2017-10-20 RX ADMIN — PHYTONADIONE 10 MG: 10 INJECTION, EMULSION INTRAMUSCULAR; INTRAVENOUS; SUBCUTANEOUS at 08:10

## 2017-10-20 RX ADMIN — FUROSEMIDE 40 MG: 40 TABLET ORAL at 11:10

## 2017-10-20 RX ADMIN — SPIRONOLACTONE 100 MG: 100 TABLET ORAL at 11:10

## 2017-10-20 RX ADMIN — POTASSIUM BICARBONATE 50 MEQ: 25 TABLET, EFFERVESCENT ORAL at 11:10

## 2017-10-20 RX ADMIN — IOHEXOL 15 ML: 350 INJECTION, SOLUTION INTRAVENOUS at 12:10

## 2017-10-20 NOTE — PROGRESS NOTES
Transplant Recipient Adult Psychosocial Assessment    Marcie Bazan  [address] [facility] [address]    Telephone Information:   Mobile [phone]   Home  [phone] (home)  Work  There is no work phone number on file.  E-mail  No e-mail address on record    Sex: female  YOB: 1965  Age: 52 y.o.    Encounter Date: 10/19/2017  [place] Citizen: yes  Primary Language: English   Needed: no    Emergency Contact:  Name: Reginaldo Bazan  Relationship: daughter  Address: [address] [place] [address]  Phone Numbers:  [phone] (mobile)    Family/Social Support:   Number of dependents/: none  Marital history: Patient single, never .  Other family dynamics: Pt has 2 adult children who are both very supportive.  Pt reports having 3 sisters, but one sister is in a wheelchair and could not assist.  Pt has a friend Ross who is with her now in the hospital, but he has his own health issues.  Pt's daughter is 8 months pregnant at this time, but she does have two older children, a 16 y.o. Daughter and 13 y.o. Son.      Household Composition:  Patient has her own home in section 8 housing in [place], [place], but she has not been staying there often.  She has been spending more time at daughters home since she became sick.  Name: Reginaldo Bazan  Age: 32  Relationship: daughter  Does person drive? yes    Name: Cale Schaefer  Age: not provided  Relationship: daughters fiance  Does person drive? yes    Name: Jazmine Bazan and Enoc Bazan  Age: 16 and 13  Relationship: grandson and granddaughter  Does person drive? no    Do you and your caregivers have access to reliable transportation? yes  PRIMARY CAREGIVER: Undetermined at this time who will be primary caregiver.  Patient's daughter, Reginaldo Bazan, [phone], will speak with pts two sisters, Patricia and Yani about taking turns helping with patient's care.  Pts monse is planning to have a rotation of caregivers.          provided in-depth information to patient and caregiver regarding pre- and post-transplant caregiver role.   strongly encourages patient and caregiver to have concrete plan regarding post-transplant care giving, including back-up caregiver(s) to ensure care giving needs are met as needed.    Patient and Caregiver states understanding all aspects of caregiver role/commitment and is able/willing/committed to being caregiver to the fullest extent necessary.    Patient and Caregiver verbalizes understanding of the education provided today and caregiver responsibilities.         remains available. Patient and Caregiver agree to contact  in a timely manner if concerns arise.      Able to take time off work without financial concerns: Patient's johnugher is planning to apply for FMLA benefits for her job, so she is able to assist until her baby is born.     Additional Significant Others who will Assist with Transplant:  Name: Duarte Bazan, phone [phone]  Age: 32  City: [place] State: [place]   Relationship: son  Does person drive? yes    Name: Ross Tapia  Age: not provided  City: [place]  State: [place]  Relationship: friend  Does person drive? yes   Has own medical issues, half of one foot has been amputated, but he gets around on his own.  Other possible caregivers:  1. Jose Luis Bazan's fiance, [place]  2. Patricia- pts sister  3. Yani-pts sister  4. Cale Silvano- Reginaldo's fiance     Living Will: no  Healthcare Power of : no  Advance Directives on file: <<no information> per medical record.  Verbally reviewed LW/HCPA information.   provided patient with copy of LW/HCPA documents and provided education on completion of forms.    Living Donors: No.    Highest Education Level: Attended College/Technical School  Reading Ability: college  Reports difficulty with: short term memory and vision changes, wears eyeglasses; hearing changes, which  is a new issue  Learns Best By:  Verbal instruction     Status: no  VA Benefits: no     Working for Income: No.  Patient reports a pending social security disability application, which should be going to court soon.  If no, reason not working: Unknown    Patient is not working at this time.  Patient reports she last worked about 3-4 years ago, when she became sick, but could not give a diagnosis reason of what caused her not to work.  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  Pt worked for [employer] in [facility] [place].     Spouse/Significant Other Employment: Patient's daughter works full time and attends school.    Disabled: no-has pending SSD application    Monthly Income:  [employer] $ 500  She receives no other income.  She lives in [place] [address] housing.  Her daughter helps with any bills patient may have.  Able to afford all costs now and if transplanted, including medications: yes , only with her daughers assistance  Patient verbalizes understanding of personal responsibilities related to transplant costs and the importance of having a financial plan to ensure that patients transplant costs are fully covered.      provided fundraising information/education.  Patient verbalizes understanding.   remains available.    Insurance:   Payor/Plan Subscr  Sex Relation Sub. Ins. ID Effective Group Num   1. UNITED RESOUR* GUADALUPE TOMLIN 1965 Female  [id number]* Not Eff                                    [address] [address]   2. MEDICAID - * GUADALUPE TOMLIN 1965 Female  [id number]                                   [address] [address]     Primary Insurance (for UNOS reporting): Public Insurance - Medicaid  Secondary Insurance (for UNOS reporting): None  Patient verbalizes clear understanding that patient may experience difficulty  obtaining and/or be denied insurance coverage post-surgery. This includes and is not limited to disability insurance, life insurance, health insurance, burial insurance, long term care insurance, and other insurances.    Patient also reports understanding that future health concerns related to or unrelated to transplantation may not be covered by patient's insurance.  Resources and information provided and reviewed.      Patient provides verbal permission to release any necessary information to outside resources for patient care and discharge planning.  Resources and information provided are reviewed.      Dialysis Adherence:  none    Infusion Service: patient utilizing? no  Home Health: patient utilizing? no  DME: no  Pulmonary/Cardiac Rehab: none   ADLS:  Patient has been weak at home requiring assistance with ADL's.  Her daughter, son and son's fiance have been assisting her with bathing, shopping, cooking and cleaning.  Pt is not driving.    Adherence:   Pt reports a high adherence with healthcare recommendations.  Adherence education and counseling provided.     Per History Section:Past Medical History:   Diagnosis Date    Hypertension     Liver cirrhosis      Social History   Substance Use Topics    Smoking status: Never Smoker    Smokeless tobacco: Never Used    Alcohol use Yes      Comment: occasionally     History   Drug Use No     History   Sexual Activity    Sexual activity: Not Currently       Per Today's Psychosocial:  Tobacco: none, patient denies any use.  Alcohol: Patient reports last ETOH use was months ago, she can't remember.  Per hospital records, last ETOH use could be anywhere from 6 months to 1 year ago.   Pt could only report that she never drank daily, only at functions.  In past she would go out drinking with her friend when she would come over, but this was never daily and  this friend  10 years ago per friend Ross. Pt denies legal issues related to alcohol use.    Illicit  Drugs/Non-prescribed Medications: none, patient denies any use.    Patient has chronic pain issues of 3 ruptured discs.  She had been seeing a pain management physician who prescribed pain medications, but he dropped her as she missed two appointments with him when she was feeling unwell.  So she does not have a new pain management physician and has not been taking any narcotics.   Pt was having back spasms today.    Patient states clear understanding of the potential impact of substance use as it relates to transplant candidacy and is aware of possible random substance screening.  Substance abstinence/cessation counseling, education and resources provided and reviewed.     Arrests/DWI/Treatment/Rehab: patient denies    Psychiatric History:    Mental Health: Patient denies any mental health difficulties  Psychiatrist/Counselor: none  Medications:  none  Suicide/Homicide Issues: Patient denies any SI/HI now or in the past.    Safety at home: Patient denies ever having any safety issues in the home setting.     Knowledge: Patient states having clear understanding and realistic expectations regarding the potential risks and potential benefits of organ transplantation and organ donation, agrees to discuss with health care team members and support system members and to utilize available resources and express questions and/or concerns in order to further facilitate the pt informed decision-making.  Resources and information provided and reviewed.     Patient is aware of [facility] affiliation and/or partnership with agencies in home health care, LTAC, SNF, [facility], and other hospitals and clinics.    Understanding: Patient reports having a clear understanding of the many lifetime commitments involved with being a transplant recipient, including costs, compliance, medications, lab work, procedures, appointments, concrete and financial planning, preparedness, timely and appropriate communication of concerns, abstinence (ETOH,  tobacco, illicit non-prescribed drugs), adherence to all health care team recommendations, support system and caregiver involvement, appropriate and timely resource utilization and follow-through, mental health counseling as needed/recommended, and patient and caregiver responsibilities.  Social Service Handbook, resources and detailed educational information provided and reviewed.  Educational information provided.    Patient also reports current and expected compliance with health care regime and states having a clear understanding of the importance of compliance.      Patient reports a clear understanding that risks and benefits may be involved with organ transplantation and with organ donation.      Patient also reports clear understanding that psychosocial risk factors may affect patient, and include but are not limited to feelings of depression, generalized anxiety, anxiety regarding dependence on others, post traumatic stress disorder, feelings of guilt and other emotional and/or mental concerns, and/or exacerbation of existing mental health concerns.  Detailed resources provided and discussed.     Patient agrees to access appropriate resources in a timely manner as needed and/or as recommended, and to communicate concerns appropriately.  Patient also reports a clear understanding of treatment options available.      reviewed education, provided additional information, and answered questions.    Feelings or Concerns: Patient is ready to get on the path to recovery.    Coping: Patient denies any coping issues, reports she has her bhavin and when able will attend Confucianism at [employer]. She is Shinto.    Goals: Patient wants to be able to take care of her new grandbaby who arrives next month.  Patient referred to Vocational Rehabilitation.    Interview Behavior: Patient presents as alert and oriented x 4, pleasant, good eye contact, well groomed, average intelligence, calm, communicative,  cooperative and asking and answering questions appropriately. Patient's friend Ross at bedside, he presented alert and oriented x4, pleasant, good eye contact, communicative and answering questions, he is a jokester.            Transplant Social Work - Candidacy  Assessment/Plan:     Psychosocial Suitability: Patient presents as an unacceptable candidate for liver transplant at this time. Based on psychosocial risk factors, patient presents as high risk, due to not having an identified caregiver plan rotation in place at this time. Patient's daughter Reginaldo will speak with family members and friends to see who will be able to assist in a caregiver rotation and will have that information and contact phone numbers for  on Monday of next week; past ETOH, but poor historian of when last time drank alcohol and how much she had been drinking.  It could be anywhere from 6 months to a year ago; patient denies drinking daily. Pt denies any mental health issues, denies any tobacco use and denies any past drug use.     Recommendations/Additional Comments:   -follow-up with patient and patient's daughter on Monday regarding caregiver plan rotation to confirm patient has adequate support.  -PETH test pending  -addiction psychiatry evaluation  -local lodging post liver transplant at transplant lodging, [place]

## 2017-10-20 NOTE — CONSULTS
[facility]  Hepatology  Consult Note    Patient Name: Marcie Bazan  MRN: [id number]  Admission Date: 10/19/2017  Hospital Length of Stay: 1 days  Attending Provider: Burton Vora MD   Primary Care Physician: ODILIA Wall  Principal Problem:Decompensated hepatic cirrhosis    Inpatient consult to Hepatology  Consult performed by: SHAUNA YO  Consult ordered by: BURTON VORA        Subjective:     Transplant status: No    HPI:  This is a 51 y/o female with hx of cirrhosis, previously attributed to PERKINS but suspicion for etoh component as well, complicated by small EV, ascites who presents as admission from Dr. Souza in Hepatology clinic for decompensated cirrhosis.  Noted to have hypokalemia and elevated WBC in clinic thus sent to ER.   Went to clinic and states she has been otherwise in her USOH. She has no complaints this morning.   Pt reports symptoms of fatigue, poor appetite, abdominal distention, abd pain, constipation, problems urinating and generalized weakness.    Has told other providers that she has not had a drink in last 6 months, however tells me that she has not had a drink in at least 1 year.  She states she used to drink daily with a friend, but since that friend has passed away, she has not drank. She states she isnt sure how her drinking caused liver disease because they never drank 'that much'    Records reports EGD in August 2016 that showed small esophageal varices with severe gastritis.    Colonoscopy 2015 with 2 polyps and internal hemorrhoids  In the ER, tap was negative for SBP.    US doppler performed as well:  Decreased Doppler signal in the distal main portal vein and left and right portal veins which may reflect slow flow with no definite thrombus visualized.  Three-phase contrast-enhanced CT may be performed for further evaluation of the portal venous system as warranted.  Findings compatible cirrhosis and portal hypertension, including  nodular heterogeneous liver and large volume of ascites.  Status post cholecystectomy.        Review of Systems   Constitutional: Negative for chills, fatigue and fever.   HENT: Negative for mouth sores and nosebleeds.    Eyes: Negative for pain and redness.   Respiratory: Negative for cough and shortness of breath.    Cardiovascular: Negative for chest pain and palpitations.   Gastrointestinal: Positive for abdominal distention. Negative for abdominal pain, nausea and vomiting.   Genitourinary: Negative for dysuria and hematuria.   Musculoskeletal: Negative for arthralgias and joint swelling.   Skin: Positive for color change and pallor.   Neurological: Negative for seizures and facial asymmetry.   Hematological: Bruises/bleeds easily.   Psychiatric/Behavioral: Negative for agitation and confusion.       Past Medical History:   Diagnosis Date    Hypertension     Liver cirrhosis        Past Surgical History:   Procedure Laterality Date    BREAST SURGERY      CHOLECYSTECTOMY      HYSTERECTOMY         Family history of liver disease: No    Review of patient's allergies indicates:   Allergen Reactions    Ferrous sulfate Other (See Comments)     Patient states the pill makes her sick. She stated she would rather have a shot       Social History Main Topics    Smoking status: Never Smoker    Smokeless tobacco: Never Used    Alcohol use Yes      Comment: occasionally    Drug use: No    Sexual activity: Not Currently       Prescriptions Prior to Admission   Medication Sig Dispense Refill Last Dose    cetirizine (ZYRTEC) 10 MG tablet Take 10 mg by mouth once daily.   10/18/2017    lactulose (CHRONULAC) 10 gram/15 mL solution Take 30 mLs by mouth 2 (two) times daily.  3 10/18/2017    omeprazole (PRILOSEC) 40 MG capsule Take 40 mg by mouth once daily.   10/18/2017    ondansetron (ZOFRAN-ODT) 4 MG TbDL Take 1 tablet (4 mg total) by mouth every 8 (eight) hours as needed (nausea and vomiting). 12 tablet 0 Past  Month    spironolactone (ALDACTONE) 50 MG tablet Take 1 tablet (50 mg total) by mouth once daily. 30 tablet 1 10/18/2017    gabapentin (NEURONTIN) 300 MG capsule Take 300 mg by mouth as needed.    Unknown    lisinopril (PRINIVIL,ZESTRIL) 5 MG tablet Take 5 mg by mouth once daily.   Unknown    nitroGLYCERIN (NITROSTAT) 0.4 MG SL tablet Place 0.4 mg under the tongue every 5 (five) minutes as needed.       polyethylene glycol (GLYCOLAX) 17 gram PwPk Take 17 g by mouth 2 (two) times daily. 100 each 1 Taking    potassium chloride SA (K-DUR,KLOR-CON) 20 MEQ tablet Take 1 mEq by mouth 2 (two) times daily.   Taking       Objective:     Vital Signs (Most Recent):  Temp: 97.4 °F (36.3 °C) (10/20/17 0732)  Pulse: 104 (10/20/17 0732)  Resp: 18 (10/20/17 0732)  BP: 131/64 (10/20/17 0732)  SpO2: 100 % (10/20/17 0732) Vital Signs (24h Range):  Temp:  [97.4 °F (36.3 °C)-98.3 °F (36.8 °C)] 97.4 °F (36.3 °C)  Pulse:  [] 104  Resp:  [17-22] 18  SpO2:  [98 %-100 %] 100 %  BP: (123-148)/(58-83) 131/64     Weight: 74 kg (163 lb 2.3 oz) (10/19/17 2000)  Body mass index is 27.15 kg/m².    Physical Exam   Constitutional: She is oriented to person, place, and time. No distress.   HENT:   Head: Normocephalic and atraumatic.   Eyes: Conjunctivae are normal. Scleral icterus is present.   Neck: Neck supple.   Cardiovascular: Normal rate and regular rhythm.    Pulmonary/Chest: Breath sounds normal. No stridor. No respiratory distress.   Abdominal: Soft. She exhibits distension. There is no tenderness. There is no guarding.   Musculoskeletal: She exhibits no tenderness or deformity.   Neurological: She is alert and oriented to person, place, and time.   No asterixis  Slightly slow to respond but alert and oriented   Skin: Skin is warm and dry. No rash noted.   Psychiatric: She has a normal mood and affect. Her behavior is normal.   Vitals reviewed.      MELD-Na score: 28 at 10/20/2017  4:13 AM  MELD score: 24 at 10/20/2017  4:13  AM  Calculated from:  Serum Creatinine: 0.9 mg/dL (Rounded to 1) at 10/20/2017  4:13 AM  Serum Sodium: 129 mmol/L at 10/20/2017  4:13 AM  Total Bilirubin: 11.8 mg/dL at 10/20/2017  4:13 AM  INR(ratio): 2.0 at 10/20/2017  4:13 AM  Age: 52 years    Significant Labs:  CBC:   Recent Labs  Lab 10/20/17  0413   WBC 13.56*   RBC 3.81*   HGB 10.1*   HCT 28.5*   *     BMP:   Recent Labs  Lab 10/20/17  0413      *   K 3.0*   CL 94*   CO2 24   BUN 11   CREATININE 0.9   CALCIUM 8.1*     CMP:   Recent Labs  Lab 10/20/17  0413      CALCIUM 8.1*   ALBUMIN 2.0*   PROT 6.6   *   K 3.0*   CO2 24   CL 94*   BUN 11   CREATININE 0.9   ALKPHOS 153*   ALT 23   AST 57*   BILITOT 11.8*     Coagulation:   Recent Labs  Lab 10/20/17  0413   INR 2.0*       Significant Imaging:  Labs: Reviewed    Assessment/Plan:     * Decompensated hepatic cirrhosis    Hx of cirrhosis, presumed secondary to PERKINS and Etoh, who presents from clinic with labs concerning for leukocytosis, worsening LFTs and hypokalemia.    Workup for infectious source unrevealing as of now, with SBP negative, normal UA, normal CXR. US doppler fairly unremarkable.  Her history of etoh use varies between providers, and these findings may suggest ongoing Etoh use (leukocytosis, AST > ALT, high Tbili)    DF is 53.    Plan:  PETH test pending  Check CT triple phase  Check AFP  Continue lactulose and rifixamin  Aggressively replete Mag with K  Strict I/ Os ; 1 L fluid restrict    Pt approved for liver transplant evaluation as inpatient.  Will order appropriate serologies.  addicition psych consult        Hyponatremia    As above        Leucocytosis    As above            Thank you for your consult. I will follow-up with patient. Please contact us if you have any additional questions.    Stalin Powers MD  Hepatology  [facility]

## 2017-10-20 NOTE — SUBJECTIVE & OBJECTIVE
Past Medical History:   Diagnosis Date    Hypertension     Liver cirrhosis        Past Surgical History:   Procedure Laterality Date    BREAST SURGERY      CHOLECYSTECTOMY      HYSTERECTOMY         Review of patient's allergies indicates:   Allergen Reactions    Ferrous sulfate Other (See Comments)     Patient states the pill makes her sick. She stated she would rather have a shot       No current facility-administered medications on file prior to encounter.      Current Outpatient Prescriptions on File Prior to Encounter   Medication Sig    cetirizine (ZYRTEC) 10 MG tablet Take 10 mg by mouth once daily.    lactulose (CHRONULAC) 10 gram/15 mL solution Take 30 mLs by mouth 2 (two) times daily.    omeprazole (PRILOSEC) 40 MG capsule Take 40 mg by mouth once daily.    ondansetron (ZOFRAN-ODT) 4 MG TbDL Take 1 tablet (4 mg total) by mouth every 8 (eight) hours as needed (nausea and vomiting).    spironolactone (ALDACTONE) 50 MG tablet Take 1 tablet (50 mg total) by mouth once daily.    gabapentin (NEURONTIN) 300 MG capsule Take 300 mg by mouth as needed.     lisinopril (PRINIVIL,ZESTRIL) 5 MG tablet Take 5 mg by mouth once daily.    nitroGLYCERIN (NITROSTAT) 0.4 MG SL tablet Place 0.4 mg under the tongue every 5 (five) minutes as needed.    polyethylene glycol (GLYCOLAX) 17 gram PwPk Take 17 g by mouth 2 (two) times daily.    potassium chloride SA (K-DUR,KLOR-CON) 20 MEQ tablet Take 1 mEq by mouth 2 (two) times daily.    [DISCONTINUED] furosemide (LASIX) 40 MG tablet Take 40 mg by mouth once daily.     Family History     Problem Relation (Age of Onset)    Depression Maternal Grandfather    Diabetes Father, Sister    Hypertension Mother, Father        Social History Main Topics    Smoking status: Never Smoker    Smokeless tobacco: Never Used    Alcohol use Yes      Comment: occasionally    Drug use: No    Sexual activity: Not Currently     Review of Systems   Constitutional: Positive for fatigue.  Negative for chills and fever.   HENT: Negative for rhinorrhea and sore throat.    Eyes: Negative for pain and discharge.   Respiratory: Negative for cough and shortness of breath.    Cardiovascular: Negative for chest pain and leg swelling.   Gastrointestinal: Positive for abdominal distention and nausea. Negative for abdominal pain, blood in stool and vomiting.   Endocrine: Negative for cold intolerance and heat intolerance.   Genitourinary: Negative for dysuria and flank pain.   Musculoskeletal: Negative for arthralgias and back pain.   Skin: Negative for color change and pallor.   Allergic/Immunologic: Negative for environmental allergies and food allergies.   Neurological: Negative for dizziness and numbness.   Hematological: Negative for adenopathy. Does not bruise/bleed easily.   Psychiatric/Behavioral: Negative for behavioral problems and confusion.     Objective:     Vital Signs (Most Recent):  Temp: 97.4 °F (36.3 °C) (10/19/17 2104)  Pulse: 100 (10/19/17 2104)  Resp: 20 (10/19/17 2104)  BP: (!) 140/72 (10/19/17 2104)  SpO2: 100 % (10/19/17 2104) Vital Signs (24h Range):  Temp:  [97.4 °F (36.3 °C)-98 °F (36.7 °C)] 97.4 °F (36.3 °C)  Pulse:  [] 100  Resp:  [17-22] 20  SpO2:  [98 %-100 %] 100 %  BP: (123-148)/(58-83) 140/72     Weight: 74 kg (163 lb 2.3 oz)  Body mass index is 27.15 kg/m².    Physical Exam   Constitutional: She is oriented to person, place, and time. She appears well-developed and well-nourished.   HENT:   Head: Normocephalic and atraumatic.   Eyes: Conjunctivae are normal. Pupils are equal, round, and reactive to light.   Neck: Normal range of motion. No thyromegaly present.   Cardiovascular: Normal rate, regular rhythm and normal heart sounds.    Pulmonary/Chest: Effort normal and breath sounds normal.   Abdominal: Soft. She exhibits distension. There is no hepatosplenomegaly. There is no tenderness.   Genitourinary: Rectal exam shows guaiac negative stool.   Musculoskeletal: Normal  range of motion. She exhibits no edema.   Lymphadenopathy:     She has no cervical adenopathy.     She has no axillary adenopathy.   Neurological: She is alert and oriented to person, place, and time.   Skin: No erythema. No pallor.   Psychiatric: She has a normal mood and affect. Her behavior is normal.        Significant Labs:   CBC:   Recent Labs  Lab 10/19/17  0913 10/19/17  1228   WBC 14.06* 14.52*   HGB 11.0* 11.8*   HCT 31.5* 34.2*   * 165     CMP:   Recent Labs  Lab 10/19/17  0913 10/19/17  1228 10/19/17  2129   * 129* 130*   K 2.8* 2.7* 3.0*   CL 91* 88* 93*   CO2 29 27 26   * 115* 101   BUN 9 10 10   CREATININE 0.9 1.1 0.9   CALCIUM 9.0 9.0 8.4*   PROT 7.2 8.3 6.8   ALBUMIN 2.2* 2.5* 2.1*   BILITOT 13.0* 14.5* 12.1*   ALKPHOS 166* 195* 155*   AST 75* 74* 63*   ALT 24 29 24   ANIONGAP 10 14 11   EGFRNONAA >60.0 57.1* >60.0     Coagulation:   Recent Labs  Lab 10/19/17  0913   INR 2.2*       Significant Imaging: I have reviewed all pertinent imaging results/findings within the past 24 hours.

## 2017-10-20 NOTE — SUBJECTIVE & OBJECTIVE
Interval History: patient is more alert this morning; had several BMs over night; she got up and walked with PT/OT  - patient seems to be having different accounts of when her last alcoholic beverage was; PETH pending  - psych evaluated and state patient will be re-evaluated after IOP and AA  - patient has been approved for inpatient liver transplant evaluation; getting IR paracentesis today and CT triple phase to evaluate for possible PVT and elevated AFP    Review of Systems   Constitutional: Negative for chills and fever.   HENT: Negative for rhinorrhea and sore throat.    Eyes: Negative for pain and discharge.   Respiratory: Negative for cough and shortness of breath.    Cardiovascular: Negative for chest pain and leg swelling.   Gastrointestinal: Positive for abdominal distention. Negative for abdominal pain, blood in stool, nausea and vomiting.   Endocrine: Negative for cold intolerance and heat intolerance.   Genitourinary: Negative for dysuria and flank pain.   Neurological: Negative for dizziness and numbness.   Psychiatric/Behavioral: Negative for behavioral problems and confusion.     Objective:     Vital Signs (Most Recent):  Temp: 98 °F (36.7 °C) (10/20/17 1154)  Pulse: 103 (10/20/17 1500)  Resp: 18 (10/20/17 1154)  BP: (!) 111/53 (10/20/17 1154)  SpO2: 98 % (10/20/17 1154) Vital Signs (24h Range):  Temp:  [97.4 °F (36.3 °C)-98.3 °F (36.8 °C)] 98 °F (36.7 °C)  Pulse:  [] 103  Resp:  [18-20] 18  SpO2:  [98 %-100 %] 98 %  BP: (111-148)/(53-77) 111/53     Weight: 74 kg (163 lb 2.3 oz)  Body mass index is 27.15 kg/m².    Intake/Output Summary (Last 24 hours) at 10/20/17 1618  Last data filed at 10/20/17 0117   Gross per 24 hour   Intake              350 ml   Output                0 ml   Net              350 ml      Physical Exam   Constitutional: She is oriented to person, place, and time. She appears well-developed and well-nourished.   HENT:   Head: Normocephalic and atraumatic.   Eyes: Conjunctivae  are normal. Pupils are equal, round, and reactive to light. Scleral icterus is present.   Neck: Normal range of motion. No thyromegaly present.   Cardiovascular: Normal rate, regular rhythm and normal heart sounds.    Pulmonary/Chest: Effort normal and breath sounds normal.   Abdominal: Soft. She exhibits distension. There is no tenderness.   Musculoskeletal: Normal range of motion. She exhibits no edema.   Neurological: She is alert and oriented to person, place, and time.   Skin: No erythema. No pallor.       MELD-Na score: 28 at 10/20/2017  4:13 AM  MELD score: 24 at 10/20/2017  4:13 AM  Calculated from:  Serum Creatinine: 0.9 mg/dL (Rounded to 1) at 10/20/2017  4:13 AM  Serum Sodium: 129 mmol/L at 10/20/2017  4:13 AM  Total Bilirubin: 11.8 mg/dL at 10/20/2017  4:13 AM  INR(ratio): 2.0 at 10/20/2017  4:13 AM  Age: 52 years    Significant Labs:  CBC:    Recent Labs  Lab 10/19/17  0913 10/19/17  1228 10/20/17  0413   WBC 14.06* 14.52* 13.56*   HGB 11.0* 11.8* 10.1*   HCT 31.5* 34.2* 28.5*   * 165 136*     CMP:    Recent Labs  Lab 10/19/17  1228 10/19/17  2129 10/20/17  0413   * 130* 129*   K 2.7* 3.0* 3.0*   CL 88* 93* 94*   CO2 27 26 24   * 101 109   BUN 10 10 11   CREATININE 1.1 0.9 0.9   CALCIUM 9.0 8.4* 8.1*   PROT 8.3 6.8 6.6   ALBUMIN 2.5* 2.1* 2.0*   BILITOT 14.5* 12.1* 11.8*   ALKPHOS 195* 155* 153*   AST 74* 63* 57*   ALT 29 24 23   ANIONGAP 14 11 11   EGFRNONAA 57.1* >60.0 >60.0     PTINR:    Recent Labs  Lab 10/19/17  0913 10/20/17  0413   INR 2.2* 2.0*       Significant Procedures:   Dobutamine Stress Test with Color Flow: No results found for this or any previous visit.

## 2017-10-20 NOTE — PROCEDURES
Radiology Post-Procedure Note    Pre Op Diagnosis: Ascites  Post Op Diagnosis: Same    Procedure: Paracentesis    Procedure performed by: Dr. Fan Owens    Written Informed Consent Obtained: Yes  Specimen Removed: YES  Estimated Blood Loss: Minimal    Findings:   Successful paracentesis.  Albumin administered PRN per protocol.    Patient tolerated procedure well.    Fan Owens MD  Radiology, PGY - II  [phone]

## 2017-10-20 NOTE — PROGRESS NOTES
[facility] Hospital Medicine  Progress Note    Patient Name: Marcie Bazan  MRN: [id number]  Patient Class: IP- Inpatient   Admission Date: 10/19/2017  Length of Stay: 1 days  Attending Physician: Burton Vora MD  Primary Care Provider: Milton Ferrer, [facility] Medicine Team: [facility] MED A Burton Vora MD    Subjective:     Principal Problem:Decompensated hepatic cirrhosis    HPI:  51 y/o AAF with PMHx of HTN and cirrhosis presents to the ED for admission. She was seen in the liver transplant clinic for eval for liver transplant. She was noted to have hypokalemia and leukocytosis so was sent to the ED for further evaluation. She reports fatigue, decreased appetite, chills, abdominal distention, abdominal pain, constipation, difficulty urinating, lightheadedness. She has PRN paracentesis (last in early October). She reports that she is taking lactulose. She is on potassium supplementation but is unable to tolerate liquid potassium (causes emesis), she was changed to K tablets today (has not yet filled this). She denies fever, chest pain, diarrhea, dysuria, LE edema. She states heavy use of alcohol in the past, but denies any recent use in past six months and denies tobacco or drug use.      Hospital Course:  No notes on file    Interval History: patient is more alert this morning; had several BMs over night; she got up and walked with PT/OT  - patient seems to be having different accounts of when her last alcoholic beverage was; PETH pending  - psych evaluated and state patient will be re-evaluated after IOP and AA  - patient has been approved for inpatient liver transplant evaluation; getting IR paracentesis today and CT triple phase to evaluate for possible PVT and elevated AFP    Review of Systems   Constitutional: Negative for chills and fever.   HENT: Negative for rhinorrhea and sore throat.    Eyes: Negative for pain and discharge.   Respiratory: Negative for cough and  shortness of breath.    Cardiovascular: Negative for chest pain and leg swelling.   Gastrointestinal: Positive for abdominal distention. Negative for abdominal pain, blood in stool, nausea and vomiting.   Endocrine: Negative for cold intolerance and heat intolerance.   Genitourinary: Negative for dysuria and flank pain.   Neurological: Negative for dizziness and numbness.   Psychiatric/Behavioral: Negative for behavioral problems and confusion.     Objective:     Vital Signs (Most Recent):  Temp: 98 °F (36.7 °C) (10/20/17 1154)  Pulse: 103 (10/20/17 1500)  Resp: 18 (10/20/17 1154)  BP: (!) 111/53 (10/20/17 1154)  SpO2: 98 % (10/20/17 1154) Vital Signs (24h Range):  Temp:  [97.4 °F (36.3 °C)-98.3 °F (36.8 °C)] 98 °F (36.7 °C)  Pulse:  [] 103  Resp:  [18-20] 18  SpO2:  [98 %-100 %] 98 %  BP: (111-148)/(53-77) 111/53     Weight: 74 kg (163 lb 2.3 oz)  Body mass index is 27.15 kg/m².    Intake/Output Summary (Last 24 hours) at 10/20/17 1618  Last data filed at 10/20/17 0117   Gross per 24 hour   Intake              350 ml   Output                0 ml   Net              350 ml      Physical Exam   Constitutional: She is oriented to person, place, and time. She appears well-developed and well-nourished.   HENT:   Head: Normocephalic and atraumatic.   Eyes: Conjunctivae are normal. Pupils are equal, round, and reactive to light. Scleral icterus is present.   Neck: Normal range of motion. No thyromegaly present.   Cardiovascular: Normal rate, regular rhythm and normal heart sounds.    Pulmonary/Chest: Effort normal and breath sounds normal.   Abdominal: Soft. She exhibits distension. There is no tenderness.   Musculoskeletal: Normal range of motion. She exhibits no edema.   Neurological: She is alert and oriented to person, place, and time.   Skin: No erythema. No pallor.       MELD-Na score: 28 at 10/20/2017  4:13 AM  MELD score: 24 at 10/20/2017  4:13 AM  Calculated from:  Serum Creatinine: 0.9 mg/dL (Rounded to 1)  at 10/20/2017  4:13 AM  Serum Sodium: 129 mmol/L at 10/20/2017  4:13 AM  Total Bilirubin: 11.8 mg/dL at 10/20/2017  4:13 AM  INR(ratio): 2.0 at 10/20/2017  4:13 AM  Age: 52 years    Significant Labs:  CBC:    Recent Labs  Lab 10/19/17  0913 10/19/17  1228 10/20/17  0413   WBC 14.06* 14.52* 13.56*   HGB 11.0* 11.8* 10.1*   HCT 31.5* 34.2* 28.5*   * 165 136*     CMP:    Recent Labs  Lab 10/19/17  1228 10/19/17  2129 10/20/17  0413   * 130* 129*   K 2.7* 3.0* 3.0*   CL 88* 93* 94*   CO2 27 26 24   * 101 109   BUN 10 10 11   CREATININE 1.1 0.9 0.9   CALCIUM 9.0 8.4* 8.1*   PROT 8.3 6.8 6.6   ALBUMIN 2.5* 2.1* 2.0*   BILITOT 14.5* 12.1* 11.8*   ALKPHOS 195* 155* 153*   AST 74* 63* 57*   ALT 29 24 23   ANIONGAP 14 11 11   EGFRNONAA 57.1* >60.0 >60.0     PTINR:    Recent Labs  Lab 10/19/17  0913 10/20/17  0413   INR 2.2* 2.0*       Significant Procedures:   Dobutamine Stress Test with Color Flow: No results found for this or any previous visit.        Assessment/Plan:      * Decompensated hepatic cirrhosis    PERKINS and Alcoholic with ascites  MELD-Na score: 28 at 10/20/2017  4:13 AM  MELD score: 24 at 10/20/2017  4:13 AM  Calculated from:  Serum Creatinine: 0.9 mg/dL (Rounded to 1) at 10/20/2017  4:13 AM  Serum Sodium: 129 mmol/L at 10/20/2017  4:13 AM  Total Bilirubin: 11.8 mg/dL at 10/20/2017  4:13 AM  INR(ratio): 2.0 at 10/20/2017  4:13 AM  Age: 52 years  - hepatology consult  - unclear what caused recent decompensation, however based on elevated WBC and LFT pattern, it seems like patient may still be drinking; had a tap in the ED, negative for SBP  -  PETH pending  - addiction psych consult, they state will re-evaluate after IOP and AA meetings  - patient has been approved for inpatient liver transplant evaluation           Hepatic encephalopathy    - improved with lactulose; add rifaximin           Severe protein-calorie malnutrition    - has hardly had any appetite in weeks; PAB; dietary and  ensure          Alcoholic hepatitis with ascites    - see decompensated hepatic cirrhosis           Elevated AFP    - no clear mass seen on U/S; possible PVT; will get CT abdomen pelvis triple phase for further evaluation           Hyponatremia    - fluid restriction to 1200 cc          Hypomagnesemia    - replace           Hypokalemia    - replace           Leucocytosis    - no current evidence of infection; U/A and CXR negative; SBP negative; blood cultures negative  - likely due to alcoholic hepatitis           Coagulopathy    - started on vitamin K for 3 days           Ascites    - see above           NAFLD (nonalcoholic fatty liver disease)    - see decompensated cirrhosis           Anemia    Of chronic disease, monitor             VTE Risk Mitigation         Ordered     Medium Risk of VTE  Once      10/19/17 1631     Place sequential compression device  Until discontinued      10/19/17 1510              Burton Vora MD  Department of Hospital Medicine   [facility]

## 2017-10-20 NOTE — HPI
51 y/o AAF with PMHx of HTN and cirrhosis presents to the ED for admission. She was seen in the liver transplant clinic for eval for liver transplant. She was noted to have hypokalemia and leukocytosis so was sent to the ED for further evaluation. She reports fatigue, decreased appetite, chills, abdominal distention, abdominal pain, constipation, difficulty urinating, lightheadedness. She has PRN paracentesis (last in early October). She reports that she is taking lactulose. She is on potassium supplementation but is unable to tolerate liquid potassium (causes emesis), she was changed to K tablets today (has not yet filled this). She denies fever, chest pain, diarrhea, dysuria, LE edema. She states heavy use of alcohol in the past, but denies any recent use in past six months and denies tobacco or drug use.

## 2017-10-20 NOTE — PLAN OF CARE
Problem: Patient Care Overview  Goal: Plan of Care Review  Outcome: Ongoing (interventions implemented as appropriate)  1. When medically feasible, resume 2 gram sodium diet (fluid restriction per MD).   2. Add Boost Plus ONS (vanilla) to aid in caloric intake.   3. RD to monitor & follow-up.

## 2017-10-20 NOTE — ASSESSMENT & PLAN NOTE
Hx of cirrhosis, presumed secondary to PERKINS and Etoh, who presents from clinic with labs concerning for leukocytosis, worsening LFTs and hypokalemia.    Workup for infectious source unrevealing as of now, with SBP negative, normal UA, normal CXR. US doppler fairly unremarkable.  Her history of etoh use varies between providers, and these findings may suggest ongoing Etoh use (leukocytosis, AST > ALT, high Tbili)    DF is 53.    Plan:  PETH test pending  Check CT triple phase  Check AFP  Continue lactulose and rifixamin  Aggressively replete Mag with K  Strict I/ Os ; 1 L fluid restrict    Pt approved for liver transplant evaluation as inpatient.  Will order appropriate serologies.  addicition psych consult

## 2017-10-20 NOTE — H&P
Inpatient Radiology Pre-procedure Note    History of Present Illness:    Marcie Bazan is a 52 y.o. female with cirrhosis who presents for paracentesis.    Admission H&P reviewed.  Past Medical History:   Diagnosis Date    Hypertension     Liver cirrhosis      Past Surgical History:   Procedure Laterality Date    BREAST SURGERY      CHOLECYSTECTOMY      HYSTERECTOMY         Review of Systems:   As documented in primary team H&P    Home Meds:   Prior to Admission medications    Medication Sig Start Date End Date Taking? Authorizing Provider   cetirizine (ZYRTEC) 10 MG tablet Take 10 mg by mouth once daily. 2/9/17 2/9/18 Yes Historical Provider, MD   lactulose (CHRONULAC) 10 gram/15 mL solution Take 30 mLs by mouth 2 (two) times daily. 10/8/17  Yes Historical Provider, MD   omeprazole (PRILOSEC) 40 MG capsule Take 40 mg by mouth once daily. 3/8/17  Yes Historical Provider, MD   ondansetron (ZOFRAN-ODT) 4 MG TbDL Take 1 tablet (4 mg total) by mouth every 8 (eight) hours as needed (nausea and vomiting). 9/15/17  Yes Bisi Jonas NP   spironolactone (ALDACTONE) 100 MG tablet Take 100 mg by mouth once daily.   Yes Historical Provider, MD   spironolactone (ALDACTONE) 50 MG tablet Take 1 tablet (50 mg total) by mouth once daily. 9/16/17 10/20/17 Yes Bisi Jonas NP   lisinopril (PRINIVIL,ZESTRIL) 5 MG tablet Take 1 tablet by mouth only as needed for high blood pressure 6/8/17   Historical Provider, MD   potassium chloride SA (K-DUR,KLOR-CON) 20 MEQ tablet Take 1 mEq by mouth 2 (two) times daily. 10/12/17   Historical Provider, MD   gabapentin (NEURONTIN) 300 MG capsule Take 300 mg by mouth as needed.  12/22/15 10/20/17  Historical Provider, MD   nitroGLYCERIN (NITROSTAT) 0.4 MG SL tablet Place 0.4 mg under the tongue every 5 (five) minutes as needed. 9/11/17 10/20/17  Historical Provider, MD   polyethylene glycol (GLYCOLAX) 17 gram PwPk Take 17 g by mouth 2 (two) times daily. 9/15/17 10/20/17  Bisi  JESSICA Jonas NP     Scheduled Meds:    cetirizine  10 mg Oral Daily    furosemide  40 mg Oral Daily    [START ON 10/21/2017] lactulose  15 g Oral TID    pantoprazole  40 mg Oral Daily    phytonadione ((AQUA-MEPHYTON) IVPB  10 mg Intravenous Daily    spironolactone  100 mg Oral Daily     Continuous Infusions:    PRN Meds:acetaminophen, dextrose 50%, dextrose 50%, glucagon (human recombinant), glucose, glucose, influenza, omnipaque, ondansetron  Anticoagulants/Antiplatelets: no anticoagulation    Allergies:   Review of patient's allergies indicates:   Allergen Reactions    Ferrous sulfate Other (See Comments)     Patient states the pill makes her sick. She stated she would rather have a shot     Sedation Hx: have not been any systemic reactions    Labs:    Recent Labs  Lab 10/20/17  0413   INR 2.0*       Recent Labs  Lab 10/20/17  0413   WBC 13.56*   HGB 10.1*   HCT 28.5*   MCV 75*   *      Recent Labs  Lab 10/19/17  0913  10/20/17  0413   *  < > 109   *  < > 129*   K 2.8*  < > 3.0*   CL 91*  < > 94*   CO2 29  < > 24   BUN 9  < > 11   CREATININE 0.9  < > 0.9   CALCIUM 9.0  < > 8.1*   MG  --   < > 1.2*   ALT 24  < > 23   AST 75*  < > 57*   ALBUMIN 2.2*  < > 2.0*   BILITOT 13.0*  < > 11.8*   BILIDIR 7.7*  --   --    < > = values in this interval not displayed.      Vitals:  Temp: 98 °F (36.7 °C) (10/20/17 1154)  Pulse: 103 (10/20/17 1500)  Resp: 18 (10/20/17 1154)  BP: (!) 111/53 (10/20/17 1154)  SpO2: 98 % (10/20/17 1154)     Physical Exam:  ASA: 2  Mallampati: 2    General: no acute distress  Mental Status: alert and oriented to person, place and time  HEENT: normocephalic, atraumatic  Chest: unlabored breathing  Heart: regular heart rate  Abdomen: distended  Extremity: moves all extremities    Plan: Paracentesis    Sedation Plan: Local    Fan Owens MD  Radiology, PGY - II  [phone]=

## 2017-10-20 NOTE — PROGRESS NOTES
Recovery completed, pt tolerated well. No apparent distress noted. 4 Liters removed from right abd, mepore applied CDI. Report called to GUIDO Murillo. Awaiting transport.

## 2017-10-20 NOTE — H&P
[facility] Hospital Medicine  History & Physical    Patient Name: Marcie Bazan  MRN: [id number]  Admission Date: 10/19/2017  Attending Physician: Burton Vora  Primary Care Provider: Milton Ferrer, [facility] Medicine Team: [facility] HOSP MED A Burton Vora MD     Patient information was obtained from patient and ER records.     Subjective:     Principal Problem:Decompensated hepatic cirrhosis    Chief Complaint:   Chief Complaint   Patient presents with    Abnormal Lab     WBC elevated; Potassium low        HPI: 51 y/o AAF with PMHx of HTN and cirrhosis presents to the ED for admission. She was seen in the liver transplant clinic for eval for liver transplant. She was noted to have hypokalemia and leukocytosis so was sent to the ED for further evaluation. She reports fatigue, decreased appetite, chills, abdominal distention, abdominal pain, constipation, difficulty urinating, lightheadedness. She has PRN paracentesis (last in early October). She reports that she is taking lactulose. She is on potassium supplementation but is unable to tolerate liquid potassium (causes emesis), she was changed to K tablets today (has not yet filled this). She denies fever, chest pain, diarrhea, dysuria, LE edema. She states heavy use of alcohol in the past, but denies any recent use in past six months and denies tobacco or drug use.      Past Medical History:   Diagnosis Date    Hypertension     Liver cirrhosis        Past Surgical History:   Procedure Laterality Date    BREAST SURGERY      CHOLECYSTECTOMY      HYSTERECTOMY         Review of patient's allergies indicates:   Allergen Reactions    Ferrous sulfate Other (See Comments)     Patient states the pill makes her sick. She stated she would rather have a shot       No current facility-administered medications on file prior to encounter.      Current Outpatient Prescriptions on File Prior to Encounter   Medication Sig    cetirizine  (ZYRTEC) 10 MG tablet Take 10 mg by mouth once daily.    lactulose (CHRONULAC) 10 gram/15 mL solution Take 30 mLs by mouth 2 (two) times daily.    omeprazole (PRILOSEC) 40 MG capsule Take 40 mg by mouth once daily.    ondansetron (ZOFRAN-ODT) 4 MG TbDL Take 1 tablet (4 mg total) by mouth every 8 (eight) hours as needed (nausea and vomiting).    spironolactone (ALDACTONE) 50 MG tablet Take 1 tablet (50 mg total) by mouth once daily.    gabapentin (NEURONTIN) 300 MG capsule Take 300 mg by mouth as needed.     lisinopril (PRINIVIL,ZESTRIL) 5 MG tablet Take 5 mg by mouth once daily.    nitroGLYCERIN (NITROSTAT) 0.4 MG SL tablet Place 0.4 mg under the tongue every 5 (five) minutes as needed.    polyethylene glycol (GLYCOLAX) 17 gram PwPk Take 17 g by mouth 2 (two) times daily.    potassium chloride SA (K-DUR,KLOR-CON) 20 MEQ tablet Take 1 mEq by mouth 2 (two) times daily.    [DISCONTINUED] furosemide (LASIX) 40 MG tablet Take 40 mg by mouth once daily.     Family History     Problem Relation (Age of Onset)    Depression Maternal Grandfather    Diabetes Father, Sister    Hypertension Mother, Father        Social History Main Topics    Smoking status: Never Smoker    Smokeless tobacco: Never Used    Alcohol use Yes      Comment: occasionally    Drug use: No    Sexual activity: Not Currently     Review of Systems   Constitutional: Positive for fatigue. Negative for chills and fever.   HENT: Negative for rhinorrhea and sore throat.    Eyes: Negative for pain and discharge.   Respiratory: Negative for cough and shortness of breath.    Cardiovascular: Negative for chest pain and leg swelling.   Gastrointestinal: Positive for abdominal distention and nausea. Negative for abdominal pain, blood in stool and vomiting.   Endocrine: Negative for cold intolerance and heat intolerance.   Genitourinary: Negative for dysuria and flank pain.   Musculoskeletal: Negative for arthralgias and back pain.   Skin: Negative for  color change and pallor.   Allergic/Immunologic: Negative for environmental allergies and food allergies.   Neurological: Negative for dizziness and numbness.   Hematological: Negative for adenopathy. Does not bruise/bleed easily.   Psychiatric/Behavioral: Negative for behavioral problems and confusion.     Objective:     Vital Signs (Most Recent):  Temp: 97.4 °F (36.3 °C) (10/19/17 2104)  Pulse: 100 (10/19/17 2104)  Resp: 20 (10/19/17 2104)  BP: (!) 140/72 (10/19/17 2104)  SpO2: 100 % (10/19/17 2104) Vital Signs (24h Range):  Temp:  [97.4 °F (36.3 °C)-98 °F (36.7 °C)] 97.4 °F (36.3 °C)  Pulse:  [] 100  Resp:  [17-22] 20  SpO2:  [98 %-100 %] 100 %  BP: (123-148)/(58-83) 140/72     Weight: 74 kg (163 lb 2.3 oz)  Body mass index is 27.15 kg/m².    Physical Exam   Constitutional: She is oriented to person, place, and time. She appears well-developed and well-nourished.   HENT:   Head: Normocephalic and atraumatic.   Eyes: Conjunctivae are normal. Pupils are equal, round, and reactive to light.   Neck: Normal range of motion. No thyromegaly present.   Cardiovascular: Normal rate, regular rhythm and normal heart sounds.    Pulmonary/Chest: Effort normal and breath sounds normal.   Abdominal: Soft. She exhibits distension. There is no hepatosplenomegaly. There is no tenderness.   Genitourinary: Rectal exam shows guaiac negative stool.   Musculoskeletal: Normal range of motion. She exhibits no edema.   Lymphadenopathy:     She has no cervical adenopathy.     She has no axillary adenopathy.   Neurological: She is alert and oriented to person, place, and time.   Skin: No erythema. No pallor.   Psychiatric: She has a normal mood and affect. Her behavior is normal.        Significant Labs:   CBC:   Recent Labs  Lab 10/19/17  0913 10/19/17  1228   WBC 14.06* 14.52*   HGB 11.0* 11.8*   HCT 31.5* 34.2*   * 165     CMP:   Recent Labs  Lab 10/19/17  0913 10/19/17  1228 10/19/17  2129   * 129* 130*   K 2.8* 2.7*  3.0*   CL 91* 88* 93*   CO2 29 27 26   * 115* 101   BUN 9 10 10   CREATININE 0.9 1.1 0.9   CALCIUM 9.0 9.0 8.4*   PROT 7.2 8.3 6.8   ALBUMIN 2.2* 2.5* 2.1*   BILITOT 13.0* 14.5* 12.1*   ALKPHOS 166* 195* 155*   AST 75* 74* 63*   ALT 24 29 24   ANIONGAP 10 14 11   EGFRNONAA >60.0 57.1* >60.0     Coagulation:   Recent Labs  Lab 10/19/17  0913   INR 2.2*       Significant Imaging: I have reviewed all pertinent imaging results/findings within the past 24 hours.    Assessment/Plan:     * Decompensated hepatic cirrhosis    PERKINS and Alcoholic with ascites  MELD-Na score: 28 at 10/19/2017  9:29 PM  MELD score: 25 at 10/19/2017  9:29 PM  Calculated from:  Serum Creatinine: 0.9 mg/dL (Rounded to 1) at 10/19/2017  9:29 PM  Serum Sodium: 130 mmol/L at 10/19/2017  9:29 PM  Total Bilirubin: 12.1 mg/dL at 10/19/2017  9:29 PM  INR(ratio): 2.2 at 10/19/2017  9:13 AM  Age: 52 years  - hepatology consult  - unclear what caused recent decompensation, however based on elevated WBC and LFT pattern, it seems like patient may still be drinking; had a tap in the ED, negative for SBP  - will send [facility]  - addiction psych consult; urine tox pending           Severe protein-calorie malnutrition    - has hardly had any appetite in weeks; PAB; dietary and ensure          Alcoholic hepatitis with ascites    - see decompensated hepatic cirrhosis           Elevated AFP    - no clear mass seen on U/S; possible PVT; will get CT abdomen pelvis triple phase for further evaluation           Hyponatremia    - fluid restriction to 1200 cc          Hypomagnesemia    - replace           Hypokalemia    - replace           Leucocytosis    - no current evidence of infection; U/A and CXR negative; SBP negative; blood cultures negative  - likely due to alcoholic hepatitis           Coagulopathy    - started on vitamin K for 3 days           Ascites    - see above           NAFLD (nonalcoholic fatty liver disease)    - see decompensated cirrhosis            Anemia    Of chronic disease, monitor             VTE Risk Mitigation         Ordered     Medium Risk of VTE  Once      10/19/17 1631     Place sequential compression device  Until discontinued      10/19/17 1510             Burton Vora MD  Department of Hospital Medicine   [facility]

## 2017-10-20 NOTE — PLAN OF CARE
Problem: Occupational Therapy Goal  Goal: Occupational Therapy Goal  Goals to be met by: 11/5/17     Patient will increase functional independence with ADLs by performing:    UE Dressing with Modified [place].  LE Dressing with Modified [place].  Grooming while standing at sink with Modified [place].  Toileting from toilet with Modified [place] for hygiene and clothing management.   Supine to sit with Modified [place].  Stand pivot transfers with Modified [place].  Toilet transfer to toilet with Modified [place].    Outcome: Ongoing (interventions implemented as appropriate)  OT eval completed. The above goals are established to improve functional (I) and mobility.    JA Potts  10/20/2017  Pager: [phone]

## 2017-10-20 NOTE — ASSESSMENT & PLAN NOTE
PERKINS and Alcoholic with ascites  MELD-Na score: 28 at 10/20/2017  4:13 AM  MELD score: 24 at 10/20/2017  4:13 AM  Calculated from:  Serum Creatinine: 0.9 mg/dL (Rounded to 1) at 10/20/2017  4:13 AM  Serum Sodium: 129 mmol/L at 10/20/2017  4:13 AM  Total Bilirubin: 11.8 mg/dL at 10/20/2017  4:13 AM  INR(ratio): 2.0 at 10/20/2017  4:13 AM  Age: 52 years  - hepatology consult  - unclear what caused recent decompensation, however based on elevated WBC and LFT pattern, it seems like patient may still be drinking; had a tap in the ED, negative for SBP  -  PETH pending  - addiction psych consult, they state will re-evaluate after IOP and AA meetings  - patient has been approved for inpatient liver transplant evaluation

## 2017-10-20 NOTE — ASSESSMENT & PLAN NOTE
- no current evidence of infection; U/A and CXR negative; SBP negative; blood cultures negative  - likely due to alcoholic hepatitis

## 2017-10-20 NOTE — ASSESSMENT & PLAN NOTE
PERKINS and Alcoholic with ascites  MELD-Na score: 28 at 10/19/2017  9:29 PM  MELD score: 25 at 10/19/2017  9:29 PM  Calculated from:  Serum Creatinine: 0.9 mg/dL (Rounded to 1) at 10/19/2017  9:29 PM  Serum Sodium: 130 mmol/L at 10/19/2017  9:29 PM  Total Bilirubin: 12.1 mg/dL at 10/19/2017  9:29 PM  INR(ratio): 2.2 at 10/19/2017  9:13 AM  Age: 52 years  - hepatology consult  - unclear what caused recent decompensation, however based on elevated WBC and LFT pattern, it seems like patient may still be drinking; had a tap in the ED, negative for SBP  - will send PETH  - addiction psych consult; urine tox pending

## 2017-10-20 NOTE — PLAN OF CARE
Problem: Physical Therapy Goal  Goal: Physical Therapy Goal  Goals to be met by: 10/30/2017    Patient will increase functional independence with mobility by performin. Supine to sit with Modified [place]  2. Sit to supine with Modified [place]  3. Sit to stand transfer with Supervision  4. Bed to chair transfer with Supervision using Rolling Walker  5. Gait  x 100 feet with Minimal Assistance using Rolling Walker.     Outcome: Ongoing (interventions implemented as appropriate)  Goals appropriate to improve functional mobility.    Filemon Feliciano III, DPT, PT  10/20/2017

## 2017-10-20 NOTE — PT/OT/SLP EVAL
"Occupational Therapy  Evaluation/ Treatment    Marcie Bazan   MRN: [id number]   Admitting Diagnosis: Decompensated hepatic cirrhosis    OT Date of Treatment: 10/20/17   OT Start Time: 0921  OT Stop Time: 0942  OT Total Time (min): 21 min    Billable Minutes:  Evaluation 13 minutes  Therapeutic Activity 8 minutes    Diagnosis: Decompensated hepatic cirrhosis   Decreased safety awareness; decreased strength, endurance, balance    Past Medical History:   Diagnosis Date    Hypertension     Liver cirrhosis       Past Surgical History:   Procedure Laterality Date    BREAST SURGERY      CHOLECYSTECTOMY      HYSTERECTOMY         Referring physician: GIDEON Vora  Date referred to OT: 10/20/2017    General Precautions: Standard, fall    Do you have any cultural, spiritual, Catholic conflicts, given your current situation?: none stated     Patient History:  Living Environment  Lives With: child(sae), adult  Living Arrangements: house  Home Accessibility:  (pt stays at her house (son and his fiance visit); or pt stays at daughter's house; both have tub/shower combos)  Transportation Available: family or friend will provide  Living Environment Comment: Pt lives with family in a 1SH with no PATTY; has a tub/shower combo; pt is mostly (I), but has declined in function recently and requires varying levels of assistance for self care and mobility.  Equipment Currently Used at Home: none    Prior level of function:   Bed Mobility/Transfers: independent  Grooming: independent  Bathing: independent  Upper Body Dressing: independent  Lower Body Dressing: independent  Toileting: independent  Home Management Skills: independent  Homemaking Responsibilities: Yes  Mode of Transportation: Family     Dominant hand: right    Subjective:  Communicated with RN prior to session.  "I need a new liver."  Chief Complaint: fatigue  Patient/Family stated goals: get better    Pain/Comfort  Pain Rating 1: 0/10  Pain Rating Post-Intervention 1: " 0/10    Objective:   Pt found in bathroom and agreeable to therapy.    Cognitive Exam:  Oriented to: Person, Place, Time and Situation  Follows Commands/attention: Follows multistep  commands  Communication: clear/fluent  Memory:  No Deficits noted  Safety awareness/insight to disability: impaired  Coping skills/emotional control: Appropriate to situation    Visual/perceptual:  Intact    Physical Exam:  Postural examination/scapula alignment: No postural abnormalities identified  Skin integrity: Visible skin intact  Edema: Severe abdominal    Sensation:   Intact    Upper Extremity Range of Motion:  Right Upper Extremity: WFL  Left Upper Extremity: WFL    Upper Extremity Strength:  Right Upper Extremity: 3+/5  Left Upper Extremity: 3+/5   Strength: Fair-Good    Fine motor coordination:   Intact    Gross motor coordination: WFL    Functional Mobility:  Bed Mobility:  Rolling/Turning to Left: Independent  Rolling/Turning Right: Independent  Scooting/Bridging: Independent  Supine to Sit: Supervision  Sit to Supine: Supervision    Transfers:  Sit <> Stand Assistance: Supervision  Sit <> Stand Assistive Device: No Assistive Device  Bed <> Chair Technique: Stand Pivot  Bed <> Chair Transfer Assistance: Supervision  Bed <> Chair Assistive Device: No Assistive Device  Toilet Transfer Technique: Stand Pivot  Toilet Transfer Assistance: Supervision  Toilet Transfer Assistive Device: No Assistive Device    Functional Ambulation: Supervision with RW; SBA with no AD    Activities of Daily Living:    UE Dressing Level of Assistance: Supervision (gown)  LE Dressing Level of Assistance: Supervision (donned socks EOB (by pulling leg into bed))  Grooming Position: Standing at sink  Grooming Level of Assistance: Supervision (washing hands)  Toileting Where Assessed: Toilet  Toileting Level of Assistance: Supervision    Balance:   Static Sit: GOOD+: Takes MAXIMAL challenges from all directions.    Dynamic Sit: GOOD-: Maintains  "balance through MODERATE excursions of active trunk movement,     Static Stand: FAIR+: Takes MINIMAL challenges from all directions  Dynamic stand: FAIR+: Needs CLOSE SUPERVISION during gait and is able to right self with minor LOB    Therapeutic Activities and Exercises:  Pt ed re OT role and POC. Pt performed toileting and hand washing with S. Pt returned from bathroom and sat EOB. Pt returned to supine as OT asked PLOF questions. Pt then performed supine to sit. Pt donned socks. Pt stood and ambulated to hallway with RW and S. Pt required standing rest breaks during ambulation and ed re safety and use of RW. Pt returned to the room and sat EOB.    AM-PAC 6 CLICK ADL  How much help from another person does this patient currently need?  1 = Unable, Total/Dependent Assistance  2 = A lot, Maximum/Moderate Assistance  3 = A little, Minimum/Contact Guard/Supervision  4 = None, Modified [place]/Independent    Putting on and taking off regular lower body clothing? : 3  Bathing (including washing, rinsing, drying)?: 3  Toileting, which includes using toilet, bedpan, or urinal? : 3  Putting on and taking off regular upper body clothing?: 3  Taking care of personal grooming such as brushing teeth?: 3  Eating meals?: 4  Total Score: 19    AM-PAC Raw Score CMS "G-Code Modifier Level of Impairment Assistance   6 % Total / Unable   7 - 9 CM 80 - 100% Maximal Assist   10-14 CL 60 - 80% Moderate Assist   15 - 19 CK 40 - 60% Moderate Assist   20 - 22 CJ 20 - 40% Minimal Assist   23 CI 1-20% SBA / CGA   24 CH 0% Independent/ Mod I       Patient left sitting EOB with call button in reach    Assessment:  Marcie Bazan is a 52 y.o. female with a medical diagnosis of Decompensated hepatic cirrhosis and presents with the impairments listed below. Pt participates well and is motivated to regain (I)ce in mobility and self care. Over the last few months, pt has declined in function re mobility and self care and has required " varying levels of assistance. Pt would benefit from cont OT to improve endurance for self care and mobility, as well as to improve safety awareness and skills in modification of tasks for continued independence in self care.    Pt evaluation falls under moderate complexity for evaluation coding due to identification of 3-5 performance deficits noted as stated above. Eval required Min/Mod assistance to complete on this date and detailed assessment(s) were utilized. Moreover, an expanded review of history and occupational profile obtained with additional review of cognitive, physical and psychosocial hx.     Rehab identified problem list/impairments: Rehab identified problem list/impairments: weakness, impaired endurance, decreased safety awareness, edema, impaired self care skills, impaired functional mobilty    Rehab potential is good.    Activity tolerance: Fair    Discharge recommendations: Discharge Facility/Level Of Care Needs: home with home health     Barriers to discharge: Barriers to Discharge: None    Equipment recommendations: walker, rolling, shower chair, bedside commode     GOALS:    Occupational Therapy Goals        Problem: Occupational Therapy Goal    Goal Priority Disciplines Outcome Interventions   Occupational Therapy Goal     OT, PT/OT Ongoing (interventions implemented as appropriate)    Description:  Goals to be met by: 11/5/17     Patient will increase functional independence with ADLs by performing:    UE Dressing with Modified [place].  LE Dressing with Modified [place].  Grooming while standing at sink with Modified [place].  Toileting from toilet with Modified [place] for hygiene and clothing management.   Supine to sit with Modified [place].  Stand pivot transfers with Modified [place].  Toilet transfer to toilet with Modified [place].                      PLAN:  Patient to be seen 2 x/week to address the above listed problems via self-care/home  management, therapeutic activities, therapeutic exercises, cognitive retraining  Plan of Care expires:    Plan of Care reviewed with: patient    Jaron JA Mcrae  10/20/2017  Pager: [phone]

## 2017-10-20 NOTE — PLAN OF CARE
Problem: Patient Care Overview  Goal: Plan of Care Review  Outcome: Ongoing (interventions implemented as appropriate)  Pt arrives from ED and is Aox4. Pt appeared lethargic. Pt stated she was hungry and good was given to pt. K riders restarted due to delay in Ed. Pts K level was 3.0mmol. Mg sulfate was also restarted and completed. Pt received new IV and tolerated well. Pt advised to remain on bedrest. Will continue to monitor for any further changes.

## 2017-10-20 NOTE — PLAN OF CARE
Milton Ferrer, FNP  [address] / [place] [address]      [facility] - [place], [place] - [address] AT [place] & [place]  [address]  [place] [address]  Phone: [phone] Fax: [phone]    [facility] - [place], [place] - [address] AT [facility] [address] [facility]  [address]  [place] [address]  Phone: [phone] Fax: [phone]      Payor: [employer] / Plan: [employer] (TRANSPLANT) / Product Type: Managed Care Transplant /     Patient sent from Hepatology clinic for hospital admission        10/20/17 1147   Discharge Assessment   Assessment Type Discharge Planning Assessment   Confirmed/corrected address and phone number on facesheet? Yes   Assessment information obtained from? Patient   Expected Length of Stay (days) 3   Communicated expected length of stay with patient/caregiver yes   Prior to hospitilization cognitive status: Unable to Assess   Prior to hospitalization functional status: Independent   Current cognitive status: Alert/Oriented   Current Functional Status: Independent   Lives With child(sae), adult   Able to Return to Prior Arrangements yes   Is patient able to care for self after discharge? Yes   Who are your caregiver(s) and their phone number(s)? (Viola Bazan  daughter [phone])   Patient's perception of discharge disposition home or selfcare   Patient currently receives any other outside agency services? No   Equipment Currently Used at Home none   Do you have any problems affording any of your prescribed medications? No   Does the patient have transportation home? Yes   Transportation Available family or friend will provide   Discharge Plan A Home with family   Discharge Plan B Home with family;Home Health   Patient/Family In Agreement With Plan yes

## 2017-10-20 NOTE — PT/OT/SLP EVAL
Physical Therapy  Evaluation    Marcie Bazan   MRN: [id number]   Admitting Diagnosis: Decompensated hepatic cirrhosis    PT Received On: 10/20/17  PT Start Time: 1412     PT Stop Time: 1424    PT Total Time (min): 12 min       Billable Minutes:  Evaluation 12    Diagnosis: Decompensated hepatic cirrhosis      Past Medical History:   Diagnosis Date    Hypertension     Liver cirrhosis       Past Surgical History:   Procedure Laterality Date    BREAST SURGERY      CHOLECYSTECTOMY      HYSTERECTOMY         Referring physician: Diony  Date referred to PT: 10/19/2017    General Precautions: Standard, fall  Orthopedic Precautions: N/A   Braces: N/A       Do you have any cultural, spiritual, Confucianist conflicts, given your current situation?: none stated    Patient History:  Lives With: child(sae), adult (either son or daughter present for majority of the time)  Living Arrangements: house  Home Accessibility:  (no PATTY; however, either son or daughter's home have regular tub/shower)  Transportation Available: family or friend will provide  Living Environment Comment: Patient lives with either son or daughter in 1-story home no PATTY. Regular tub/shower. Indep PTA with mobility and ADLs. Family to assist upon discharge.  Equipment Currently Used at Home: none  DME owned (not currently used): none    Previous Level of Function:  Ambulation Skills: independent  Transfer Skills: independent  ADL Skills: independent  Work/Leisure Activity: independent    Subjective:  Communicated with RN prior to session.  Patient agreeable to PT session. Reports fatigue, but not pain. Friend present laying on sofa.  Chief Complaint: fatigue  Patient goals: return home    Pain/Comfort  Pain Rating 1: 0/10  Pain Rating Post-Intervention 1: 0/10      Objective:   Patient found with: peripheral IV     Cognitive Exam:  Oriented to: Person, Place, Time and Situation    Follows Commands/attention: Follows multistep  commands  Communication:  clear/fluent  Safety awareness/insight to disability: impaired    Physical Exam:  Postural examination/scapula alignment: Rounded shoulder and Head forward    Skin integrity: Visible skin intact  Edema: Severe abdominal distension    Sensation:   Intact    Lower Extremity Range of Motion:  Right Lower Extremity: WFL  Left Lower Extremity: WFL    Lower Extremity Strength:  Right Lower Extremity: WFL  Left Lower Extremity: WNL    Gross motor coordination: impaired secondary to poor dynamic balance during ambulation    Functional Mobility:  Bed Mobility:  Rolling/Turning to Left:  (not observed; patient seated EOB upon room entry friend present)    Transfers:  Sit <> Stand Assistance: Contact Guard Assistance  Sit <> Stand Assistive Device: No Assistive Device  Bed <> Chair Technique: Stand Pivot  Bed <> Chair Assistance: Contact Guard Assistance  Bed <> Chair Assistive Device: No Assistive Device    Gait:   Gait Distance: 60ftx1  Assistance 1: Contact Guard Assistance  Gait Assistive Device: Hand held assist  Gait Pattern: swing-through gait  Gait Deviation(s): decreased lisbet, increased time in double stance, decreased velocity of limb motion, decreased step length, decreased swing-to-stance ratio, decreased toe-to-floor clearance, decreased weight-shifting ability, foot flat    Balance:   Static Sit: NORMAL: No deviations seen in posture held statically  Dynamic Sit: GOOD-: Maintains balance through MODERATE excursions of active trunk movement,     Static Stand: FAIR: Maintains without assist but unable to take challenges  Dynamic stand: FAIR: Needs CONTACT GUARD during gait    Therapeutic Activities and Exercises:  Patient educated on role of PT/POC.    Bed mobility not observed; sitting EOB upon room entry.  Sit<>stand CGA without device. No LOB or instances of knee buckling.    Gait 60ftx1 CGA/Min Assist with HHA. No Ad.  Instability noted throughout ambulation trial. Poor safety awareness.  Multiple instances  of patient reaching for walls and hallway rail during ambulation.    Safest to ambulate using rolling walker at this time.  Educated on decreased safety during ambulation without device. Instructed patient to call RN/PCT staff to be present prior to mobilizing within room. Verbalized understanding.    AM-PAC 6 CLICK MOBILITY  How much help from another person does this patient currently need?   1 = Unable, Total/Dependent Assistance  2 = A lot, Maximum/Moderate Assistance  3 = A little, Minimum/Contact Guard/Supervision  4 = None, Modified [place]/Independent    Turning over in bed (including adjusting bedclothes, sheets and blankets)?: 4  Sitting down on and standing up from a chair with arms (e.g., wheelchair, bedside commode, etc.): 3  Moving from lying on back to sitting on the side of the bed?: 4  Moving to and from a bed to a chair (including a wheelchair)?: 3  Need to walk in hospital room?: 3  Climbing 3-5 steps with a railing?: 3  Total Score: 20     AM-PAC Raw Score CMS G-Code Modifier Level of Impairment Assistance   6 % Total / Unable   7 - 9 CM 80 - 100% Maximal Assist   10 - 14 CL 60 - 80% Moderate Assist   15 - 19 CK 40 - 60% Moderate Assist   20 - 22 CJ 20 - 40% Minimal Assist   23 CI 1-20% SBA / CGA   24 CH 0% Independent/ Mod I     Patient left seated EOB with all lines intact, call button in reach, RN notified and friend present.    Assessment:   Marcie Bazan is a 52 y.o. female with a medical diagnosis of Decompensated hepatic cirrhosis and presents with rehab identified impairments listed below. Good tolerance to ambulation; however, poor dynamic balance with gait instability without device. Safest to ambulate using rolling walker at this time. Gait distance limited by fatigue. To benefit from continued skilled intervention to address deficits.    History: personal factors and/or comorbidities that impact the plan of care: decompensated hepatic cirrhosis; fall risk; decreased  caregiver support  Evaluation of Body Systems: cardiovascular/pulmonary, integumentary, musculoskeletal, neuromuscular, cognition/communication  Functional Outcome Tools: AMPAC, ROM, MMT  Clinical Presentation: stable    Evaluation Complexity Level: Low    Rehab identified problem list/impairments: Rehab identified problem list/impairments: impaired endurance, impaired self care skills, impaired functional mobilty, gait instability, impaired balance, decreased lower extremity function, edema, decreased safety awareness    Rehab potential is good.    Activity tolerance: Fair    Discharge recommendations: Discharge Facility/Level Of Care Needs: home with home health     Barriers to discharge: Barriers to Discharge: None    Equipment recommendations: Equipment Needed After Discharge: walker, rolling, bedside commode, shower chair     GOALS:    Physical Therapy Goals        Problem: Physical Therapy Goal    Goal Priority Disciplines Outcome Goal Variances Interventions   Physical Therapy Goal     PT/OT, PT Ongoing (interventions implemented as appropriate)     Description:  Goals to be met by: 10/30/2017    Patient will increase functional independence with mobility by performin. Supine to sit with Modified [place]  2. Sit to supine with Modified [place]  3. Sit to stand transfer with Supervision  4. Bed to chair transfer with Supervision using Rolling Walker  5. Gait  x 100 feet with Minimal Assistance using Rolling Walker.                       PLAN:    Patient to be seen 3 x/week to address the above listed problems via gait training, therapeutic activities, therapeutic exercises, neuromuscular re-education  Plan of Care expires: 17  Plan of Care reviewed with: patient, friend          Filemon Feliciano III, PT  10/20/2017

## 2017-10-20 NOTE — MEDICAL/APP STUDENT
10/20/2017 9:29 AM  Marcie Bazan  1965  [id number]    Addiction Psychiatry Consult Note    Consult Requested By: Burton Vora MD    Chief Complaint / Reason for Consult:     Substance Abuse     Assessment:     Marcie Bazan is a 52 y.o. female with a history of substance abuse disorder, HTN, and hepatic cirrhosis who [place] [facility] [place]19/2017 after a follow-up appointment in the liver transplant clinic at which she was found to have hypokalemia and leukocytosis. She was referred at that time to the ED for further evaluation. Patient states that her last drink was in 2014 following a visit with her PCP to evaluate developing ascites and a lesion was discovered on her liver (however she informed the hospitalist on the unit that her last drink was 6 months ago). She states that prior to this, she drank intermittently, usually when a close friend would come over and they would drink together, making mixed drinks out of vodka and mixers in the . She is evasive about the amount of vodka she would consume at these times. She denies ever feeling drunk, but reports regularly falling asleep by 5:30PM and often falling asleep in a bedroom at parties and family gatherings. She denies experiencing withdrawal symptoms or building up a tolerance. She denies seeking treatment for drinking to date.     Impression:  Alcohol use disorder, severe     Recommendations:     · High risk at this time; shows very limited insight into condition at this time.   · Recommend serial PETH testing.  · Recommend referral to day program to establish that he is committed to stopping (will provide       resources to pt).    Case discussed with staff psychiatrist, Rashid Montes MD.    Subjective:     History of Present Illness:   Marcie Bazan is a 52 y.o. female with a history of substance abuse disorder, HTN, and hepatic cirrhosis who [place] [facility] [place]19/2017 after a follow-up appointment in the liver  transplant clinic at which she was found to have hypokalemia and leukocytosis. She was referred at that time to the ED for further evaluation. Patient states that her last drink was in 2014 following a visit with her PCP to evaluate developing ascites and a spot ws discovered on her liver (however she informed the hospitalist on the unit that her last drink was 6 months ago). She states that prior to this, she drank intermittently, usually when a close friend would come over and they would drink together, making mixed drinks out of vodka and mixers in the . She is evasive about the amount of vodka she would consume at these times. She denies ever feeling drunk, but reports regularly falling asleep by 5:30PM and often falling asleep in a bedroom at parties and family gatherings. She denies experiencing withdrawal symptoms or building up a tolerance. She denies seeking treatment for drinking to date.     She reports fatigue, decreased appetite, chills, abdominal distention, abdominal pain, constipation, difficulty urinating, lightheadedness. She has PRN paracentesis (last in early October). She reports that she is taking lactulose. She is on potassium supplementation but is unable to tolerate liquid potassium (causes emesis), she was changed to K tablets today (has not yet filled this). She denies fever, chest pain, diarrhea, dysuria, LE edema. She states heavy use of alcohol in the past, but denies any recent use in past six months and denies tobacco or drug use.    Collateral:       Medical Review Of Systems:  General ROS: positive for  - chills and fatigue  negative for - fever  Respiratory ROS: no cough, shortness of breath, or wheezing  Cardiovascular ROS: no chest pain or dyspnea on exertion  Gastrointestinal ROS: positive for - abdominal pain, appetite loss and abdominal distention  negative for - diarrhea  Genito-Urinary ROS: positive for - difficulty urinating  Musculoskeletal ROS: negative for - gait  disturbance, muscle pain or muscular weakness  Neurological ROS: positive for - memory loss and lightheadedness   negative for - gait disturbance or numbness/tingling    Psychiatric Review Of Systems:  Denies symptoms of depression, anxiety, auditory or visual hallucinations, suicidal or homicidal ideation     Allergies:  Ferrous sulfate    Substance Abuse History:  Substance of Choice: alcohol--vodka   Substances Used: no history of illicit drug use  History of IVDU?: No  Use of Alcohol: Yes - states last drink was in 2014   Tobacco: No  History of Withdrawals: No  History of Detox: No  Rehab History: No  Patient aware of biomedical complications? Yes, though does not seem to think that she was drinking a necessary amount to cause these symptoms     Abuse Criteria:  Failure at work, school or home:  No  Use when physically hazardous:  No  Legal Problems:  No  Use despite recurrent social/interpersonal problems:  No    Substance Use Disorder Criteria:  1. Often take in larger amounts or over a longer period of time than was intended: Denies  2. Persistent desire or unsuccessful efforts to cut down or control use: Denies  3. Great deal of time spent in activities necessary to obtain substance, use, or recover from effects: Denies  4. Craving/strong desire for substance or urge to use: Denies  5. Use resulting in failure to fulfill major role obligations at home, work or school: Denies  6. Social, occupational, recreational activities decreased because of use: Denies  7. Continued use despite having persistent or recurrent social or interpersonal problems cause or exaserbated by the substance: Denies   8. Recurrent use in situations in which it is physically hazardous: Denies  9. Use despite physical or psychological problems that are likely to have been caused or exacerbated by the substance: Yes - continued to use alcohol even after signs of liver failure manifested   10. Tolerance: No (denies)   11. Withdrawal: No  (denies)   Mild (1-3), Moderate (4-5), Severe (?6)    Medical/Surgical History:  Past Medical History:   Diagnosis Date    Hypertension     Liver cirrhosis      Past Surgical History:   Procedure Laterality Date    BREAST SURGERY      CHOLECYSTECTOMY      HYSTERECTOMY         Psychiatric History:  Previous Medication Trials: no   Previous Psychiatric Hospitalizations: no   Previous Suicide Attempts: no   History of Violence: no  Outpatient Psychiatrist: no    Social History:  Marital Status: single  Children: 2   Employment Status: retired  Education: some college  Special Ed: no   history: none  Housing Status: house in [place], stays with daughter frequently   Financial status: stable   Leisure/recreation: time with family   Childhood history: normal childhood   History of abuse: no  Access to gun: unknown    Legal History:  Past Charges/Incarcerations: no,    Pending charges: no     Family Psychiatric History:   Nephew with alcohol use disorder     Objective:     Current Medications:  Infusions:     Scheduled:   cetirizine  10 mg Oral Daily    lactulose  30 g Oral Q6H    magnesium sulfate IVPB  4 g Intravenous Once    pantoprazole  40 mg Oral Daily    phytonadione ((AQUA-MEPHYTON) IVPB  10 mg Intravenous Daily    potassium bicarbonate  50 mEq Oral Once     PRN:  acetaminophen, dextrose 50%, dextrose 50%, glucagon (human recombinant), glucose, glucose, influenza, omnipaque, ondansetron    Home Medications:  Prior to Admission medications    Medication Sig Start Date End Date Taking? Authorizing Provider   cetirizine (ZYRTEC) 10 MG tablet Take 10 mg by mouth once daily. 2/9/17 2/9/18 Yes Historical Provider, MD   lactulose (CHRONULAC) 10 gram/15 mL solution Take 30 mLs by mouth 2 (two) times daily. 10/8/17  Yes Historical Provider, MD   omeprazole (PRILOSEC) 40 MG capsule Take 40 mg by mouth once daily. 3/8/17  Yes Historical Provider, MD   ondansetron (ZOFRAN-ODT) 4 MG TbDL Take 1 tablet (4  mg total) by mouth every 8 (eight) hours as needed (nausea and vomiting). 9/15/17  Yes Bisi Jonas NP   spironolactone (ALDACTONE) 50 MG tablet Take 1 tablet (50 mg total) by mouth once daily. 9/16/17 9/16/18 Yes Bisi Jonas NP   gabapentin (NEURONTIN) 300 MG capsule Take 300 mg by mouth as needed.  12/22/15 10/19/17  Historical Provider, MD   lisinopril (PRINIVIL,ZESTRIL) 5 MG tablet Take 5 mg by mouth once daily. 6/8/17   Historical Provider, MD   nitroGLYCERIN (NITROSTAT) 0.4 MG SL tablet Place 0.4 mg under the tongue every 5 (five) minutes as needed. 9/11/17 9/11/17  Historical Provider, MD   polyethylene glycol (GLYCOLAX) 17 gram PwPk Take 17 g by mouth 2 (two) times daily. 9/15/17   Bisi Jonas NP   potassium chloride SA (K-DUR,KLOR-CON) 20 MEQ tablet Take 1 mEq by mouth 2 (two) times daily. 10/12/17   Historical Provider, MD     Vital Signs:  Temp:  [97.4 °F (36.3 °C)-98.3 °F (36.8 °C)]   Pulse:  []   Resp:  [17-22]   BP: (123-148)/(58-83)   SpO2:  [98 %-100 %]     Physical Exam:  Gen: Sedate, calm, cooperative, NAD   HEENT: Scleral icterus    Lungs: Respirations unlabored   Abdomen: Distended   Neurologic: CN II-XII grossly intact     Mental Status Exam:  Appearance: lying in bed   Behavior: reluctant to participate, eye contact minimal   Speech/Language: normal tone, normal pitch, slowed, articulation error, soft   Mood: dysthymic   Affect: mood congruent   Thought Process:  normal and logical   Thought Content: normal, no suicidality, no homicidality, delusions, or paranoia   Orientation: grossly intact   Cognition: grossly intact; trouble remembering recent events    Insight: poor   Judgment: poor     Laboratory Data:  Recent Results (from the past 48 hour(s))   Comprehensive metabolic panel    Collection Time: 10/19/17  9:13 AM   Result Value Ref Range    Sodium 130 (L) 136 - 145 mmol/L    Potassium 2.8 (L) 3.5 - 5.1 mmol/L    Chloride 91 (L) 95 - 110 mmol/L    CO2 29 23 - 29  mmol/L    Glucose 119 (H) 70 - 110 mg/dL    BUN, Bld 9 6 - 20 mg/dL    Creatinine 0.9 0.5 - 1.4 mg/dL    Calcium 9.0 8.7 - 10.5 mg/dL    Total Protein 7.2 6.0 - 8.4 g/dL    Albumin 2.2 (L) 3.5 - 5.2 g/dL    Total Bilirubin 13.0 (H) 0.1 - 1.0 mg/dL    Alkaline Phosphatase 166 (H) 55 - 135 U/L    AST 75 (H) 10 - 40 U/L    ALT 24 10 - 44 U/L    Anion Gap 10 8 - 16 mmol/L    eGFR if African American >60.0 >60 mL/min/1.73 m^2    eGFR if non African American >60.0 >60 mL/min/1.73 m^2   Gamma GT    Collection Time: 10/19/17  9:13 AM   Result Value Ref Range     (H) 8 - 55 U/L   Bilirubin, direct    Collection Time: 10/19/17  9:13 AM   Result Value Ref Range    Bilirubin, Direct 7.7 (H) 0.1 - 0.3 mg/dL   Type & Screen    Collection Time: 10/19/17  9:13 AM   Result Value Ref Range    Group & Rh A NEG     Indirect Donato NEG    CBC auto differential    Collection Time: 10/19/17  9:13 AM   Result Value Ref Range    WBC 14.06 (H) 3.90 - 12.70 K/uL    RBC 4.24 4.00 - 5.40 M/uL    Hemoglobin 11.0 (L) 12.0 - 16.0 g/dL    Hematocrit 31.5 (L) 37.0 - 48.5 %    MCV 74 (L) 82 - 98 fL    MCH 25.9 (L) 27.0 - 31.0 pg    MCHC 34.9 32.0 - 36.0 g/dL    RDW SEE COMMENT 11.5 - 14.5 %    Platelets 145 (L) 150 - 350 K/uL    MPV 9.9 9.2 - 12.9 fL    Immature Granulocytes 0.6 (H) 0.0 - 0.5 %    Gran # 10.8 (H) 1.8 - 7.7 K/uL    Immature Grans (Abs) 0.09 (H) 0.00 - 0.04 K/uL    Lymph # 1.8 1.0 - 4.8 K/uL    Mono # 1.1 (H) 0.3 - 1.0 K/uL    Eos # 0.2 0.0 - 0.5 K/uL    Baso # 0.08 0.00 - 0.20 K/uL    nRBC 0 0 /100 WBC    Gran% 76.9 (H) 38.0 - 73.0 %    Lymph% 12.6 (L) 18.0 - 48.0 %    Mono% 8.1 4.0 - 15.0 %    Eosinophil% 1.2 0.0 - 8.0 %    Basophil% 0.6 0.0 - 1.9 %    Differential Method Automated    Protime-INR    Collection Time: 10/19/17  9:13 AM   Result Value Ref Range    Prothrombin Time 21.9 (H) 9.0 - 12.5 sec    INR 2.2 (H) 0.8 - 1.2   AFP tumor marker    Collection Time: 10/19/17  9:13 AM   Result Value Ref Range    AFP 37 (H) 0.0 -  8.4 ng/mL   A1 Typing    Collection Time: 10/19/17  9:13 AM   Result Value Ref Range    A1 Typing POS    Toxicology screen, urine    Collection Time: 10/19/17 10:32 AM   Result Value Ref Range    Alcohol, Urine <10 <10 mg/dL    Benzodiazepines Negative     Methadone metabolites Negative     Cocaine (Metab.) Negative     Opiate Scrn, Ur Negative     Barbiturate Screen, Ur Negative     Amphetamine Screen, Ur Negative     THC Negative     Phencyclidine Negative     Creatinine, Random Ur 265.0 15.0 - 325.0 mg/dL    Toxicology Information SEE COMMENT    Urinalysis    Collection Time: 10/19/17 10:32 AM   Result Value Ref Range    Specimen UA Urine, Clean Catch     Color, UA Peace Yellow, Straw, Peace    Appearance, UA Cloudy (A) Clear    pH, UA 5.0 5.0 - 8.0    Specific Gravity, UA 1.020 1.005 - 1.030    Protein, UA Negative Negative    Glucose, UA Negative Negative    Ketones, UA Negative Negative    Bilirubin (UA) 2+ (A) Negative    Occult Blood UA Negative Negative    Nitrite, UA Positive (A) Negative    Urobilinogen, UA 4.0 <2.0 EU/dL    Leukocytes, UA Negative Negative   Urinalysis Microscopic    Collection Time: 10/19/17 10:32 AM   Result Value Ref Range    WBC, UA 3 0 - 5 /hpf    Bacteria, UA Many (A) None-Occ /hpf    Squam Epithel, UA 11 /hpf    Hyaline Casts, UA 21 (A) 0-1/lpf /lpf    Microscopic Comment SEE COMMENT    CBC auto differential    Collection Time: 10/19/17 12:28 PM   Result Value Ref Range    WBC 14.52 (H) 3.90 - 12.70 K/uL    RBC 4.57 4.00 - 5.40 M/uL    Hemoglobin 11.8 (L) 12.0 - 16.0 g/dL    Hematocrit 34.2 (L) 37.0 - 48.5 %    MCV 75 (L) 82 - 98 fL    MCH 25.8 (L) 27.0 - 31.0 pg    MCHC 34.5 32.0 - 36.0 g/dL    RDW 26.5 (H) 11.5 - 14.5 %    Platelets 165 150 - 350 K/uL    MPV SEE COMMENT 9.2 - 12.9 fL    Immature Granulocytes 0.9 (H) 0.0 - 0.5 %    Gran # 10.9 (H) 1.8 - 7.7 K/uL    Immature Grans (Abs) 0.13 (H) 0.00 - 0.04 K/uL    Lymph # 2.1 1.0 - 4.8 K/uL    Mono # 1.2 (H) 0.3 - 1.0 K/uL    Eos  # 0.2 0.0 - 0.5 K/uL    Baso # 0.09 0.00 - 0.20 K/uL    nRBC 0 0 /100 WBC    Gran% 75.2 (H) 38.0 - 73.0 %    Lymph% 14.1 (L) 18.0 - 48.0 %    Mono% 8.1 4.0 - 15.0 %    Eosinophil% 1.1 0.0 - 8.0 %    Basophil% 0.6 0.0 - 1.9 %    Differential Method Automated    Comprehensive metabolic panel    Collection Time: 10/19/17 12:28 PM   Result Value Ref Range    Sodium 129 (L) 136 - 145 mmol/L    Potassium 2.7 (LL) 3.5 - 5.1 mmol/L    Chloride 88 (L) 95 - 110 mmol/L    CO2 27 23 - 29 mmol/L    Glucose 115 (H) 70 - 110 mg/dL    BUN, Bld 10 6 - 20 mg/dL    Creatinine 1.1 0.5 - 1.4 mg/dL    Calcium 9.0 8.7 - 10.5 mg/dL    Total Protein 8.3 6.0 - 8.4 g/dL    Albumin 2.5 (L) 3.5 - 5.2 g/dL    Total Bilirubin 14.5 (H) 0.1 - 1.0 mg/dL    Alkaline Phosphatase 195 (H) 55 - 135 U/L    AST 74 (H) 10 - 40 U/L    ALT 29 10 - 44 U/L    Anion Gap 14 8 - 16 mmol/L    eGFR if African American >60.0 >60 mL/min/1.73 m^2    eGFR if non  57.1 (A) >60 mL/min/1.73 m^2   Lactic acid, plasma    Collection Time: 10/19/17 12:28 PM   Result Value Ref Range    Lactate (Lactic Acid) 2.8 (H) 0.5 - 2.2 mmol/L   Ammonia    Collection Time: 10/19/17 12:28 PM   Result Value Ref Range    Ammonia 63 (H) 10 - 50 umol/L   Magnesium    Collection Time: 10/19/17 12:28 PM   Result Value Ref Range    Magnesium 1.1 (L) 1.6 - 2.6 mg/dL   Phosphorus    Collection Time: 10/19/17 12:28 PM   Result Value Ref Range    Phosphorus 3.4 2.7 - 4.5 mg/dL   Troponin I    Collection Time: 10/19/17 12:28 PM   Result Value Ref Range    Troponin I 0.091 (H) 0.000 - 0.026 ng/mL   Blood Culture #1 **CANNOT BE ORDERED STAT**    Collection Time: 10/19/17 12:29 PM   Result Value Ref Range    Blood Culture, Routine No Growth to date    Blood Culture #2 **CANNOT BE ORDERED STAT**    Collection Time: 10/19/17  1:43 PM   Result Value Ref Range    Blood Culture, Routine No Growth to date    WBC & Diff,Body Fluid Peritoneal Fluid    Collection Time: 10/19/17  2:23 PM   Result  Value Ref Range    Body Fluid Type Peritoneal Fluid     Fluid Appearance Clear     Fluid Color Yellow     WBC, Body Fluid 155 /cu mm    Segs, Fluid 3 %    Lymphs, Fluid 53 %    Monocytes/Macrophages, Fluid 39 %    Mesothelial cells, Fluid 5 %   Culture, Body Fluid - Bactec    Collection Time: 10/19/17  2:23 PM   Result Value Ref Range    Body Fluid Culture, Sterile No Growth to date    Aerobic culture    Collection Time: 10/19/17  2:23 PM   Result Value Ref Range    Aerobic Bacterial Culture No growth    Culture, Anaerobic    Collection Time: 10/19/17  2:23 PM   Result Value Ref Range    Anaerobic Culture Culture in progress    Albumin, Body Fluid (Reference Lab or Non-[facility])    Collection Time: 10/19/17  2:23 PM   Result Value Ref Range    Body Fluid Source, Refrigerated ASCITES    Protein, Body Fluid (Reference Lab or Non-[facility])    Collection Time: 10/19/17  2:23 PM   Result Value Ref Range    Body Fluid Source , Frozen ASCITES    Gram stain    Collection Time: 10/19/17  2:23 PM   Result Value Ref Range    Gram Stain Result Cytospin indicates:     Gram Stain Result Few WBC's     Gram Stain Result No organisms seen    POCT glucose    Collection Time: 10/19/17  3:17 PM   Result Value Ref Range    POCT Glucose 126 (H) 70 - 110 mg/dL   Urinalysis, Reflex to Urine Culture Urine, Clean Catch    Collection Time: 10/19/17  5:54 PM   Result Value Ref Range    Specimen UA Urine, Clean Catch     Color, UA Peace Yellow, Straw, Peace    Appearance, UA Hazy (A) Clear    pH, UA 5.0 5.0 - 8.0    Specific Gravity, UA 1.020 1.005 - 1.030    Protein, UA Negative Negative    Glucose, UA Negative Negative    Ketones, UA Negative Negative    Bilirubin (UA) 2+ (A) Negative    Occult Blood UA Negative Negative    Nitrite, UA Negative Negative    Urobilinogen, UA 4.0 <2.0 EU/dL    Leukocytes, UA Negative Negative   POCT glucose    Collection Time: 10/19/17  9:12 PM   Result Value Ref Range    POCT Glucose 115 (H) 70 - 110 mg/dL    Comprehensive metabolic panel    Collection Time: 10/19/17  9:29 PM   Result Value Ref Range    Sodium 130 (L) 136 - 145 mmol/L    Potassium 3.0 (L) 3.5 - 5.1 mmol/L    Chloride 93 (L) 95 - 110 mmol/L    CO2 26 23 - 29 mmol/L    Glucose 101 70 - 110 mg/dL    BUN, Bld 10 6 - 20 mg/dL    Creatinine 0.9 0.5 - 1.4 mg/dL    Calcium 8.4 (L) 8.7 - 10.5 mg/dL    Total Protein 6.8 6.0 - 8.4 g/dL    Albumin 2.1 (L) 3.5 - 5.2 g/dL    Total Bilirubin 12.1 (H) 0.1 - 1.0 mg/dL    Alkaline Phosphatase 155 (H) 55 - 135 U/L    AST 63 (H) 10 - 40 U/L    ALT 24 10 - 44 U/L    Anion Gap 11 8 - 16 mmol/L    eGFR if African American >60.0 >60 mL/min/1.73 m^2    eGFR if non African American >60.0 >60 mL/min/1.73 m^2   Magnesium    Collection Time: 10/19/17  9:29 PM   Result Value Ref Range    Magnesium 1.0 (L) 1.6 - 2.6 mg/dL   Ethanol    Collection Time: 10/19/17 10:23 PM   Result Value Ref Range    Alcohol, Medical, Serum <10 <10 mg/dL   POCT glucose    Collection Time: 10/19/17 11:51 PM   Result Value Ref Range    POCT Glucose 142 (H) 70 - 110 mg/dL   CBC with Automated Differential    Collection Time: 10/20/17  4:13 AM   Result Value Ref Range    WBC 13.56 (H) 3.90 - 12.70 K/uL    RBC 3.81 (L) 4.00 - 5.40 M/uL    Hemoglobin 10.1 (L) 12.0 - 16.0 g/dL    Hematocrit 28.5 (L) 37.0 - 48.5 %    MCV 75 (L) 82 - 98 fL    MCH 26.5 (L) 27.0 - 31.0 pg    MCHC 35.4 32.0 - 36.0 g/dL    RDW 25.7 (H) 11.5 - 14.5 %    Platelets 136 (L) 150 - 350 K/uL    MPV 9.9 9.2 - 12.9 fL    Immature Granulocytes 0.6 (H) 0.0 - 0.5 %    Gran # 9.9 (H) 1.8 - 7.7 K/uL    Immature Grans (Abs) 0.08 (H) 0.00 - 0.04 K/uL    Lymph # 1.9 1.0 - 4.8 K/uL    Mono # 1.3 (H) 0.3 - 1.0 K/uL    Eos # 0.3 0.0 - 0.5 K/uL    Baso # 0.06 0.00 - 0.20 K/uL    nRBC 0 0 /100 WBC    Gran% 73.0 38.0 - 73.0 %    Lymph% 13.9 (L) 18.0 - 48.0 %    Mono% 9.7 4.0 - 15.0 %    Eosinophil% 2.4 0.0 - 8.0 %    Basophil% 0.4 0.0 - 1.9 %    Differential Method Automated    PT/INR    Collection Time:  10/20/17  4:13 AM   Result Value Ref Range    Prothrombin Time 20.1 (H) 9.0 - 12.5 sec    INR 2.0 (H) 0.8 - 1.2   Lactic acid, plasma    Collection Time: 10/20/17  4:13 AM   Result Value Ref Range    Lactate (Lactic Acid) 1.8 0.5 - 2.2 mmol/L   Troponin I    Collection Time: 10/20/17  4:13 AM   Result Value Ref Range    Troponin I <0.006 0.000 - 0.026 ng/mL   Comprehensive metabolic panel    Collection Time: 10/20/17  4:13 AM   Result Value Ref Range    Sodium 129 (L) 136 - 145 mmol/L    Potassium 3.0 (L) 3.5 - 5.1 mmol/L    Chloride 94 (L) 95 - 110 mmol/L    CO2 24 23 - 29 mmol/L    Glucose 109 70 - 110 mg/dL    BUN, Bld 11 6 - 20 mg/dL    Creatinine 0.9 0.5 - 1.4 mg/dL    Calcium 8.1 (L) 8.7 - 10.5 mg/dL    Total Protein 6.6 6.0 - 8.4 g/dL    Albumin 2.0 (L) 3.5 - 5.2 g/dL    Total Bilirubin 11.8 (H) 0.1 - 1.0 mg/dL    Alkaline Phosphatase 153 (H) 55 - 135 U/L    AST 57 (H) 10 - 40 U/L    ALT 23 10 - 44 U/L    Anion Gap 11 8 - 16 mmol/L    eGFR if African American >60.0 >60 mL/min/1.73 m^2    eGFR if non African American >60.0 >60 mL/min/1.73 m^2   Magnesium    Collection Time: 10/20/17  4:13 AM   Result Value Ref Range    Magnesium 1.2 (L) 1.6 - 2.6 mg/dL   Phosphorus    Collection Time: 10/20/17  4:13 AM   Result Value Ref Range    Phosphorus 2.9 2.7 - 4.5 mg/dL   Prealbumin    Collection Time: 10/20/17  4:13 AM   Result Value Ref Range    Prealbumin 3 (L) 20 - 43 mg/dL   Ammonia    Collection Time: 10/20/17  4:13 AM   Result Value Ref Range    Ammonia 73 (H) 10 - 50 umol/L   POCT glucose    Collection Time: 10/20/17  4:56 AM   Result Value Ref Range    POCT Glucose 123 (H) 70 - 110 mg/dL   Toxicology screen, urine    Collection Time: 10/20/17  6:10 AM   Result Value Ref Range    Alcohol, Urine <10 <10 mg/dL    Benzodiazepines Negative     Methadone metabolites Negative     Cocaine (Metab.) Negative     Opiate Scrn, Ur Negative     Barbiturate Screen, Ur Negative     Amphetamine Screen, Ur Negative     THC  Negative     Phencyclidine Negative     Creatinine, Random Ur 160.0 15.0 - 325.0 mg/dL    Toxicology Information SEE COMMENT    POCT glucose    Collection Time: 10/20/17  8:42 AM   Result Value Ref Range    POCT Glucose 147 (H) 70 - 110 mg/dL      Imaging:  Imaging Results          US Abdomen Complete with Dopp_Pre Liver Transplant (Final result)  Result time 10/19/17 20:48:51    Final result by Apollo Zelaya MD (10/19/17 20:48:51)                 Impression:      Decreased Doppler signal in the distal main portal vein and left and right portal veins which may reflect slow flow with no definite thrombus visualized.  Three-phase contrast-enhanced CT may be performed for further evaluation of the portal venous system as warranted.    Findings compatible cirrhosis and portal hypertension, including nodular heterogeneous liver and large volume of ascites.    Status post cholecystectomy.  ______________________________________     Electronically signed by resident: DEMETRICE JOSHI MD  Date:     10/19/17  Time:    20:42            As the supervising and teaching physician, I personally reviewed the images and resident's interpretation and I agree with the findings.          Electronically signed by: APOLLO ZELAYA MD  Date:     10/19/17  Time:    20:48              Narrative:    Time of Procedure: 10/19/17 19:38:00  Accession # [id number]    Reason for study: Decompensating liver disease with worsening LFTs    Comparison: CT abdomen pelvis 9/11/17, ultrasound abdomen limited 9/18/17    Technique: Abdominal ultrasound with Doppler was performed.    Findings: The liver is normal in size, measuring 16.9cm and cirrhotic in configuration.  No definite focal mass, evaluation for the same is limited given heterogeneous parenchyma.  No intra- or extrahepatic biliary ductal dilatation. The common bile duct measures 0.3 cm.  The gallbladder is surgically absent. The pancreas and aorta are obscured by bowel gas. The visualized  portions of the IVC appear unremarkable. The kidneys are normal in size and measure 10.9 cm on the right and 11.4 cm on the left. The spleen is normal and measures 1.8 x 4.5 cm. Large volume of ascites.     Color Doppler images demonstrate appropriate flow in the hepatic veins, the SMV, and the IVC.  There is appropriate direction of flow in the majority of the main portal vein, however there is significantly decreased Doppler signal in the distal main portal vein prior to branching into the left and right portal veins. Additionally, there is decreased Doppler signal in the left and right portal veins. This may represent slow flow with no thrombus definitively visualized. The main hepatic artery is patent without tardus parvus waveform.  The main hepatic artery resistive index is 0.75. There are no left upper quadrant collaterals or recanalization of the umbilical vein. The celiac artery was obscured by bowel gas and unable to be assessed.                             X-Ray Chest PA And Lateral (Final result)  Result time 10/19/17 13:25:58    Final result by Barron Zelaya MD (10/19/17 13:25:58)                 Impression:         Bilateral basilar subsegmental atelectasis or scarring, no large focal consolidation..          Electronically signed by: BARRON ZELAYA MD  Date:     10/19/17  Time:    13:25              Narrative:    Chest PA and lateral    Indication:Shortness of breath    Comparison:9/18/2017    Findings:  The cardiomediastinal silhouette is not enlarged, noting calcification of the aortic arch.  There is no pleural effusion.  The trachea is midline.  The lungs are symmetrically expanded bilaterally with bilateral bandlike increased interstitial attenuation projects over the lower lung zones, right greater than left, suggesting atelectasis and/or scarring. No large focal consolidation seen.  There is no pneumothorax.  The osseous structures are remarkable for degenerative changes of the spine and  shoulders.                            @SIG@

## 2017-10-20 NOTE — CONSULTS
"  [facility]  Adult Nutrition  Consult Note    SUMMARY     Recommendations    1. When medically feasible, resume 2 gram sodium diet (fluid restriction per MD).   2. Add Boost Plus ONS (vanilla) to aid in caloric intake.   3. RD to monitor & follow-up.    Goals: PO intake >50%  Nutrition Goal Status: new  Communication of RD Recs: reviewed with RN    Reason for Assessment    Reason for Assessment: physician consult  Diagnosis: other (see comments) (Cirrhosis)  Relevent Medical History: HTN   Interdisciplinary Rounds: did not attend     General Information Comments: Pt currently NPO for test. Pt with poor appetite PTA, unsure of UBW. Willing to try ONS.  Nutrition Discharge Planning: Adequate PO intake    Nutrition Prescription Ordered    Current Diet Order: NPO  Nutrition Order Comments: Was on 2 gram sodium diet w/ 25% PO intake.    Nutrition/Diet History    Patient Reported Diet/Restrictions/Preferences: general, low salt     Factors Affecting Nutritional Intake: NPO, decreased appetite, early satiety    Labs/Tests/Procedures/Meds    Pertinent Labs Reviewed: reviewed, pertinent  Pertinent Labs Comments: Na 129, Bili 11.8  Pertinent Medications Reviewed: reviewed, pertinent    Physical Findings    Overall Physical Appearance: edematous  Oral/Mouth Cavity: WDL  Skin: intact    Anthropometrics    Height: 5' 5" (165.1 cm)  Weight Method: Bed Scale  Weight: 74 kg (163 lb 2.3 oz)     Ideal Body Weight (IBW), Female: 125 lb  % Ideal Body Weight, Female (lb): 130.51 lb     BMI (Calculated): 27.2  BMI Grade: 25 - 29.9 - overweight     Weight Loss: other (see comments) (Pt unsure of UBW 2/2 fluid status.)    Estimated/Assessed Needs    Weight Used For Calorie Calculations: 74 kg (163 lb 2.3 oz)      Energy Calorie Requirements (kcal): 1676 kcal/d  Energy Need Method: [place]-St Jeor (1.25 PAL)     Weight Used For Protein Calculations: 74 kg (163 lb 2.3 oz)  Protein Requirements: 74-82 g/d     Fluid Need " Method: RDA Method, other (see comments) (Per MD or 1 mL/kcal)    Assessment and Plan    Inadequate energy intake r/t decreased appetite AEB poor PO intake PTA.  Status: New    Monitor and Evaluation    Food and Nutrient Intake: energy intake, food and beverage intake  Food and Nutrient Adminstration: diet order     Physical Activity and Function: nutrition-related ADLs and IADLs  Anthropometric Measurements: weight, weight change, body mass index  Biochemical Data, Medical Tests and Procedures: lipid profile, inflammatory profile, gastrointestinal profile, electrolyte and renal panel, glucose/endocrine profile  Nutrition-Focused Physical Findings: overall appearance    Nutrition Risk    Level of Risk: other (see comments) (2x/week)    Nutrition Follow-Up    RD Follow-up?: Yes

## 2017-10-20 NOTE — PHARMACY MED REC
"Admission Medication Reconciliation - Pharmacy Consult Note    The home medication history was taken by Hailey Agrawal, Pharmacy Tech. Based on information gathered and subsequent review by the clinical pharmacist, the items below may need attention.    You may go to "Admission" then "Reconcile Home Medications" tabs to review and/or act upon these items.    No issues noted with the medication reconciliation.    Zo Ballard, PharmD  [id number]      Patient's prior to admission medication regimen was as follows:  Medication Sig    cetirizine (ZYRTEC) 10 MG tablet Take 10 mg by mouth once daily.    lactulose (CHRONULAC) 10 gram/15 mL solution Take 30 mLs by mouth 2 (two) times daily.    omeprazole (PRILOSEC) 40 MG capsule Take 40 mg by mouth once daily.    ondansetron (ZOFRAN-ODT) 4 MG TbDL Take 1 tablet (4 mg total) by mouth every 8 (eight) hours as needed (nausea and vomiting).    spironolactone (ALDACTONE) 100 MG tablet Take 100 mg by mouth once daily.    lisinopril (PRINIVIL,ZESTRIL) 5 MG tablet Take 1 tablet by mouth only as needed for high blood pressure    potassium chloride SA (K-DUR,KLOR-CON) 20 MEQ tablet Take 1 mEq by mouth 2 (two) times daily.         Please add appropriate    SmartPhrase below:        "

## 2017-10-20 NOTE — ASSESSMENT & PLAN NOTE
- no clear mass seen on U/S; possible PVT; will get CT abdomen pelvis triple phase for further evaluation

## 2017-10-21 PROBLEM — C22.0 HCC (HEPATOCELLULAR CARCINOMA): Status: ACTIVE | Noted: 2017-10-21

## 2017-10-21 LAB
ALBUMIN FLD-MCNC: 742 MG/DL
ALBUMIN SERPL BCP-MCNC: 1.8 G/DL
ALP SERPL-CCNC: 151 U/L
ALT SERPL W/O P-5'-P-CCNC: 20 U/L
ANION GAP SERPL CALC-SCNC: 12 MMOL/L
ANISOCYTOSIS BLD QL SMEAR: SLIGHT
AST SERPL-CCNC: 52 U/L
BACTERIA UR CULT: NO GROWTH
BASOPHILS # BLD AUTO: 0.09 K/UL
BASOPHILS NFR BLD: 0.8 %
BILIRUB DIRECT SERPL-MCNC: 7.9 MG/DL
BILIRUB SERPL-MCNC: 10.2 MG/DL
BUN SERPL-MCNC: 9 MG/DL
CALCIUM SERPL-MCNC: 8.1 MG/DL
CHLORIDE SERPL-SCNC: 94 MMOL/L
CO2 SERPL-SCNC: 22 MMOL/L
CREAT SERPL-MCNC: 0.8 MG/DL
DIFFERENTIAL METHOD: ABNORMAL
EOSINOPHIL # BLD AUTO: 0.4 K/UL
EOSINOPHIL NFR BLD: 3.2 %
ERYTHROCYTE [DISTWIDTH] IN BLOOD BY AUTOMATED COUNT: 25.8 %
EST. GFR  (AFRICAN AMERICAN): >60 ML/MIN/1.73 M^2
EST. GFR  (NON AFRICAN AMERICAN): >60 ML/MIN/1.73 M^2
GGT SERPL-CCNC: 132 U/L
GLUCOSE SERPL-MCNC: 85 MG/DL
HCT VFR BLD AUTO: 28.1 %
HGB BLD-MCNC: 9.8 G/DL
HYPOCHROMIA BLD QL SMEAR: ABNORMAL
IGA SERPL-MCNC: 976 MG/DL
IGG SERPL-MCNC: 2814 MG/DL
IGM SERPL-MCNC: 216 MG/DL
IMM GRANULOCYTES # BLD AUTO: 0.05 K/UL
IMM GRANULOCYTES NFR BLD AUTO: 0.4 %
INR PPP: 1.9
IRON SERPL-MCNC: 55 UG/DL
LYMPHOCYTES # BLD AUTO: 2.4 K/UL
LYMPHOCYTES NFR BLD: 20.4 %
MAGNESIUM SERPL-MCNC: 1.8 MG/DL
MCH RBC QN AUTO: 26.1 PG
MCHC RBC AUTO-ENTMCNC: 34.9 G/DL
MCV RBC AUTO: 75 FL
MONOCYTES # BLD AUTO: 1.6 K/UL
MONOCYTES NFR BLD: 13.4 %
NEUTROPHILS # BLD AUTO: 7.2 K/UL
NEUTROPHILS NFR BLD: 61.8 %
NRBC BLD-RTO: 0 /100 WBC
PHOSPHATE SERPL-MCNC: 2.6 MG/DL
PLATELET # BLD AUTO: 118 K/UL
PLATELET BLD QL SMEAR: ABNORMAL
PMV BLD AUTO: 9.1 FL
POCT GLUCOSE: 105 MG/DL (ref 70–110)
POCT GLUCOSE: 110 MG/DL (ref 70–110)
POCT GLUCOSE: 116 MG/DL (ref 70–110)
POCT GLUCOSE: 80 MG/DL (ref 70–110)
POCT GLUCOSE: 98 MG/DL (ref 70–110)
POIKILOCYTOSIS BLD QL SMEAR: ABNORMAL
POLYCHROMASIA BLD QL SMEAR: ABNORMAL
POTASSIUM SERPL-SCNC: 3 MMOL/L
PROT FLD-MCNC: 1.9 G/DL
PROT SERPL-MCNC: 6.3 G/DL
PROTHROMBIN TIME: 18.8 SEC
RBC # BLD AUTO: 3.76 M/UL
RPR SER QL: NORMAL
SATURATED IRON: 31 %
SODIUM SERPL-SCNC: 128 MMOL/L
SPECIMEN SOURCE: NORMAL
SPECIMEN SOURCE: NORMAL
TARGETS BLD QL SMEAR: ABNORMAL
TOTAL IRON BINDING CAPACITY: 175 UG/DL
TRANSFERRIN SERPL-MCNC: 118 MG/DL
WBC # BLD AUTO: 11.67 K/UL

## 2017-10-21 PROCEDURE — 85025 COMPLETE CBC W/AUTO DIFF WBC: CPT | Mod: NTX

## 2017-10-21 PROCEDURE — 99233 SBSQ HOSP IP/OBS HIGH 50: CPT | Mod: NTX,,, | Performed by: INTERNAL MEDICINE

## 2017-10-21 PROCEDURE — 20600001 HC STEP DOWN PRIVATE ROOM: Mod: NTX

## 2017-10-21 PROCEDURE — 83735 ASSAY OF MAGNESIUM: CPT | Mod: NTX

## 2017-10-21 PROCEDURE — 36415 COLL VENOUS BLD VENIPUNCTURE: CPT | Mod: NTX

## 2017-10-21 PROCEDURE — 80053 COMPREHEN METABOLIC PANEL: CPT | Mod: NTX

## 2017-10-21 PROCEDURE — P9047 ALBUMIN (HUMAN), 25%, 50ML: HCPCS | Mod: NTX | Performed by: HOSPITALIST

## 2017-10-21 PROCEDURE — 99233 SBSQ HOSP IP/OBS HIGH 50: CPT | Mod: NTX,,, | Performed by: HOSPITALIST

## 2017-10-21 PROCEDURE — 84100 ASSAY OF PHOSPHORUS: CPT | Mod: NTX

## 2017-10-21 PROCEDURE — 63600175 PHARM REV CODE 636 W HCPCS: Mod: NTX | Performed by: HOSPITALIST

## 2017-10-21 PROCEDURE — 25000003 PHARM REV CODE 250: Mod: NTX | Performed by: HOSPITALIST

## 2017-10-21 PROCEDURE — 85610 PROTHROMBIN TIME: CPT | Mod: NTX

## 2017-10-21 RX ORDER — POTASSIUM CHLORIDE 20 MEQ/1
60 TABLET, EXTENDED RELEASE ORAL ONCE
Status: COMPLETED | OUTPATIENT
Start: 2017-10-21 | End: 2017-10-21

## 2017-10-21 RX ORDER — SPIRONOLACTONE 100 MG/1
300 TABLET, FILM COATED ORAL DAILY
Status: DISCONTINUED | OUTPATIENT
Start: 2017-10-22 | End: 2017-10-25 | Stop reason: HOSPADM

## 2017-10-21 RX ORDER — ALBUMIN HUMAN 250 G/1000ML
25 SOLUTION INTRAVENOUS ONCE
Status: COMPLETED | OUTPATIENT
Start: 2017-10-21 | End: 2017-10-21

## 2017-10-21 RX ADMIN — CETIRIZINE HYDROCHLORIDE 10 MG: 10 TABLET, FILM COATED ORAL at 09:10

## 2017-10-21 RX ADMIN — LACTULOSE 15 G: 20 SOLUTION ORAL at 08:10

## 2017-10-21 RX ADMIN — LACTULOSE 15 G: 20 SOLUTION ORAL at 01:10

## 2017-10-21 RX ADMIN — FUROSEMIDE 40 MG: 40 TABLET ORAL at 09:10

## 2017-10-21 RX ADMIN — LACTULOSE 15 G: 20 SOLUTION ORAL at 05:10

## 2017-10-21 RX ADMIN — PHYTONADIONE 10 MG: 10 INJECTION, EMULSION INTRAMUSCULAR; INTRAVENOUS; SUBCUTANEOUS at 09:10

## 2017-10-21 RX ADMIN — POTASSIUM CHLORIDE 60 MEQ: 1500 TABLET, EXTENDED RELEASE ORAL at 01:10

## 2017-10-21 RX ADMIN — PANTOPRAZOLE SODIUM 40 MG: 40 TABLET, DELAYED RELEASE ORAL at 09:10

## 2017-10-21 RX ADMIN — ALBUMIN (HUMAN) 25 G: 12.5 SOLUTION INTRAVENOUS at 01:10

## 2017-10-21 RX ADMIN — SPIRONOLACTONE 100 MG: 100 TABLET ORAL at 09:10

## 2017-10-21 NOTE — PROGRESS NOTES
[facility] Hospital Medicine  Progress Note    Patient Name: Marcie Bazan  MRN: [id number]  Patient Class: IP- Inpatient   Admission Date: 10/19/2017  Length of Stay: 2 days  Attending Physician: Burton Vora MD  Primary Care Provider: Milton Ferrer, [facility] Medicine Team: [facility] HOSP MED A Burton Vora MD    Subjective:     Principal Problem:Decompensated hepatic cirrhosis    HPI:  53 y/o AAF with PMHx of HTN and cirrhosis presents to the ED for admission. She was seen in the liver transplant clinic for eval for liver transplant. She was noted to have hypokalemia and leukocytosis so was sent to the ED for further evaluation. She reports fatigue, decreased appetite, chills, abdominal distention, abdominal pain, constipation, difficulty urinating, lightheadedness. She has PRN paracentesis (last in early October). She reports that she is taking lactulose. She is on potassium supplementation but is unable to tolerate liquid potassium (causes emesis), she was changed to K tablets today (has not yet filled this). She denies fever, chest pain, diarrhea, dysuria, LE edema. She states heavy use of alcohol in the past, but denies any recent use in past six months and denies tobacco or drug use.      Hospital Course:  No notes on file    Interval History: patient feels well today and is eating better;  at the bedside; s/p IR paracentesis yesterday with 4L removed  - CT triple phase shows 1 cm mass, suspected HCC; under going liver transplant evaluation       Review of Systems   Constitutional: Negative for chills and fever.   HENT: Negative for rhinorrhea and sore throat.    Eyes: Negative for pain and discharge.   Respiratory: Negative for cough and shortness of breath.    Cardiovascular: Negative for chest pain and leg swelling.   Gastrointestinal: Positive for abdominal distention. Negative for abdominal pain, blood in stool, nausea and vomiting.   Endocrine: Negative for cold  intolerance and heat intolerance.   Genitourinary: Negative for dysuria and flank pain.   Neurological: Negative for dizziness and numbness.   Psychiatric/Behavioral: Negative for behavioral problems and confusion.     Objective:     Vital Signs (Most Recent):  Temp: 97.8 °F (36.6 °C) (10/21/17 1619)  Pulse: 101 (10/21/17 1619)  Resp: 12 (10/21/17 1619)  BP: 124/68 (10/21/17 1619)  SpO2: 99 % (10/21/17 1619) Vital Signs (24h Range):  Temp:  [97.6 °F (36.4 °C)-98.9 °F (37.2 °C)] 97.8 °F (36.6 °C)  Pulse:  [] 101  Resp:  [12-18] 12  SpO2:  [96 %-100 %] 99 %  BP: (123-136)/(65-76) 124/68     Weight: 74 kg (163 lb 2.3 oz)  Body mass index is 27.15 kg/m².    Intake/Output Summary (Last 24 hours) at 10/21/17 1836  Last data filed at 10/21/17 1700   Gross per 24 hour   Intake             1240 ml   Output                0 ml   Net             1240 ml      Physical Exam   Constitutional: She is oriented to person, place, and time. She appears well-developed and well-nourished.   HENT:   Head: Normocephalic and atraumatic.   Eyes: Conjunctivae are normal. Pupils are equal, round, and reactive to light. Scleral icterus is present.   Neck: Normal range of motion. No thyromegaly present.   Cardiovascular: Normal rate, regular rhythm and normal heart sounds.    Pulmonary/Chest: Effort normal and breath sounds normal.   Abdominal: Soft. She exhibits distension. There is no tenderness.   Musculoskeletal: Normal range of motion. She exhibits no edema.   Neurological: She is alert and oriented to person, place, and time.   Skin: No erythema. No pallor.       MELD-Na score: 27 at 10/21/2017  3:34 AM  MELD score: 22 at 10/21/2017  3:34 AM  Calculated from:  Serum Creatinine: 0.8 mg/dL (Rounded to 1) at 10/21/2017  3:34 AM  Serum Sodium: 128 mmol/L at 10/21/2017  3:34 AM  Total Bilirubin: 10.2 mg/dL at 10/21/2017  3:34 AM  INR(ratio): 1.9 at 10/21/2017  3:34 AM  Age: 52 years    Significant Labs:  CBC:    Recent Labs  Lab  10/20/17  0413 10/21/17  0334   WBC 13.56* 11.67   HGB 10.1* 9.8*   HCT 28.5* 28.1*   * 118*     CMP:    Recent Labs  Lab 10/19/17  2129 10/20/17  0413 10/21/17  0334   * 129* 128*   K 3.0* 3.0* 3.0*   CL 93* 94* 94*   CO2 26 24 22*    109 85   BUN 10 11 9   CREATININE 0.9 0.9 0.8   CALCIUM 8.4* 8.1* 8.1*   PROT 6.8 6.6 6.3   ALBUMIN 2.1* 2.0* 1.8*   BILITOT 12.1* 11.8* 10.2*   ALKPHOS 155* 153* 151*   AST 63* 57* 52*   ALT 24 23 20   ANIONGAP 11 11 12   EGFRNONAA >60.0 >60.0 >60.0     PTINR:    Recent Labs  Lab 10/20/17  0413 10/21/17  0334   INR 2.0* 1.9*       Significant Procedures:   Dobutamine Stress Test with Color Flow: No results found for this or any previous visit.        Assessment/Plan:      * Decompensated hepatic cirrhosis    PERKINS and Alcoholic with ascites  MELD-Na score: 27 at 10/21/2017  3:34 AM  MELD score: 22 at 10/21/2017  3:34 AM  Calculated from:  Serum Creatinine: 0.8 mg/dL (Rounded to 1) at 10/21/2017  3:34 AM  Serum Sodium: 128 mmol/L at 10/21/2017  3:34 AM  Total Bilirubin: 10.2 mg/dL at 10/21/2017  3:34 AM  INR(ratio): 1.9 at 10/21/2017  3:34 AM  Age: 52 years  - hepatology consult  - unclear what caused recent decompensation, however based on elevated WBC and LFT pattern, it seems like patient may still be drinking; had a tap in the ED, negative for SBP  -  PETH pending  - addiction psych consult, they state high risk will re-evaluate after IOP and AA meetings  - patient has been approved for inpatient liver transplant evaluation   - IR paracentesis done on 10/20 with 4 L removed           HCC (hepatocellular carcinoma)    - as seen on CT triple phase; 1 cm focus on inferior hepatic lobe;           Hepatic encephalopathy    - improved with lactulose; add rifaximin           Severe protein-calorie malnutrition    - has hardly had any appetite in weeks; PAB; dietary and ensure          Alcoholic hepatitis with ascites    - see decompensated hepatic cirrhosis            Elevated AFP    - no clear mass seen on U/S; possible PVT; will get CT abdomen pelvis triple phase for further evaluation           Hyponatremia    - fluid restriction to 1200 cc          Hypomagnesemia    - replace           Hypokalemia    - replace           Leucocytosis    - no current evidence of infection; U/A and CXR negative; SBP negative; blood cultures negative  - likely due to alcoholic hepatitis           Coagulopathy    - started on vitamin K for 3 days           Ascites    - see above           NAFLD (nonalcoholic fatty liver disease)    - see decompensated cirrhosis           Anemia    Of chronic disease, monitor             VTE Risk Mitigation         Ordered     Medium Risk of VTE  Once      10/19/17 1631     Place sequential compression device  Until discontinued      10/19/17 1510              Burton Vora MD  Department of Hospital Medicine   [facility]

## 2017-10-21 NOTE — PROGRESS NOTES
[facility]  Hepatology  Progress Note    Patient Name: Marcie Bazan  MRN: [id number]  Admission Date: 10/19/2017  Hospital Length of Stay: 2 days  Attending Provider: Burton Vora MD   Primary Care Physician: ODILIA Wall  Principal Problem:Decompensated hepatic cirrhosis    Subjective:     Transplant status: No    HPI: This is a 53 y/o female with hx of cirrhosis, previously attributed to PERKINS but suspicion for etoh component as well, complicated by small EV, ascites who presents as admission from Dr. Souza in Hepatology clinic for decompensated cirrhosis.  Noted to have hypokalemia and elevated WBC in clinic thus sent to ER.   Went to clinic and states she has been otherwise in her USOH. She has no complaints this morning.   Pt reports symptoms of fatigue, poor appetite, abdominal distention, abd pain, constipation, problems urinating and generalized weakness.    Has told other providers that she has not had a drink in last 6 months, however tells me that she has not had a drink in at least 1 year.  She states she used to drink daily with a friend, but since that friend has passed away, she has not drank. She states she isnt sure how her drinking caused liver disease because they never drank 'that much'    Records reports EGD in August 2016 that showed small esophageal varices with severe gastritis.    Colonoscopy 2015 with 2 polyps and internal hemorrhoids  In the ER, tap was negative for SBP.    US doppler performed as well:  Decreased Doppler signal in the distal main portal vein and left and right portal veins which may reflect slow flow with no definite thrombus visualized.  Three-phase contrast-enhanced CT may be performed for further evaluation of the portal venous system as warranted.  Findings compatible cirrhosis and portal hypertension, including nodular heterogeneous liver and large volume of ascites.  Status post cholecystectomy.        Interval History:     Feels  better already. No complaints.    Current Facility-Administered Medications   Medication    acetaminophen tablet 650 mg    cetirizine tablet 10 mg    dextrose 50% injection 12.5 g    dextrose 50% injection 25 g    furosemide tablet 40 mg    glucagon (human recombinant) injection 1 mg    glucose chewable tablet 16 g    glucose chewable tablet 24 g    influenza (FLUZONE,FLUARIX QUADRIVALENT) vaccine 0.5 mL    lactulose 20 gram/30 mL solution Soln 15 g    omnipaque oral solution 15 mL    ondansetron disintegrating tablet 8 mg    pantoprazole EC tablet 40 mg    phytonadione vitamin k (AQUA-MEPHYTON) 10 mg in dextrose 5 % 50 mL IVPB    spironolactone tablet 100 mg       Objective:     Vital Signs (Most Recent):  Temp: 98.1 °F (36.7 °C) (10/21/17 0814)  Pulse: 98 (10/21/17 1100)  Resp: 16 (10/21/17 0814)  BP: 136/65 (10/21/17 0814)  SpO2: 99 % (10/21/17 0814) Vital Signs (24h Range):  Temp:  [97.6 °F (36.4 °C)-98.2 °F (36.8 °C)] 98.1 °F (36.7 °C)  Pulse:  [] 98  Resp:  [16-18] 16  SpO2:  [96 %-100 %] 99 %  BP: (123-137)/(58-82) 136/65     Weight: 74 kg (163 lb 2.3 oz) (10/20/17 1300)  Body mass index is 27.15 kg/m².    Physical Exam   Constitutional: She is oriented to person, place, and time. No distress.   HENT:   Head: Normocephalic and atraumatic.   Eyes: Scleral icterus is present.   Cardiovascular: Normal rate and regular rhythm.    Pulmonary/Chest: Breath sounds normal. No respiratory distress.   Abdominal: Soft. She exhibits no distension. There is no tenderness. There is no guarding.   Neurological: She is alert and oriented to person, place, and time.   More appropriate with reesponses. Improved mental status   Skin: Skin is warm and dry. No rash noted.   Vitals reviewed.      MELD-Na score: 27 at 10/21/2017  3:34 AM  MELD score: 22 at 10/21/2017  3:34 AM  Calculated from:  Serum Creatinine: 0.8 mg/dL (Rounded to 1) at 10/21/2017  3:34 AM  Serum Sodium: 128 mmol/L at 10/21/2017  3:34  AM  Total Bilirubin: 10.2 mg/dL at 10/21/2017  3:34 AM  INR(ratio): 1.9 at 10/21/2017  3:34 AM  Age: 52 years    Significant Labs:  CBC:   Recent Labs  Lab 10/21/17  0334   WBC 11.67   RBC 3.76*   HGB 9.8*   HCT 28.1*   *     BMP:   Recent Labs  Lab 10/21/17  0334   GLU 85   *   K 3.0*   CL 94*   CO2 22*   BUN 9   CREATININE 0.8   CALCIUM 8.1*     CMP:   Recent Labs  Lab 10/21/17  0334   GLU 85   CALCIUM 8.1*   ALBUMIN 1.8*   PROT 6.3   *   K 3.0*   CO2 22*   CL 94*   BUN 9   CREATININE 0.8   ALKPHOS 151*   ALT 20   AST 52*   BILITOT 10.2*     Coagulation:   Recent Labs  Lab 10/21/17  0334   INR 1.9*       Significant Imaging:  Labs: Reviewed    Assessment/Plan:     * Decompensated hepatic cirrhosis    Hx of cirrhosis, presumed secondary to PERKINS and Etoh, who presents from clinic with labs concerning for leukocytosis, worsening LFTs and hypokalemia.    Workup for infectious source unrevealing as of now, with SBP negative, normal UA, normal CXR. US doppler fairly unremarkable.  Her history of etoh use varies between providers, and these findings may suggest ongoing Etoh use (leukocytosis, AST > ALT, high Tbili)    DF is 53.    Plan:  PETH test pending  Check CT triple phase - pending read  Increase aldactone to 300mg daily for diuresis (and given low K)  Check AFP  Continue lactulose and rifixamin  Aggressively replete Mag with K  Strict I/ Os ; 1 L fluid restrict    Pt approved for liver transplant evaluation as inpatient.  Addiction psych has deemed patient HIGH risk and advises program for rehab.            Thank you for your consult. I will follow-up with patient. Please contact us if you have any additional questions.    Stalin Powers MD  Hepatology  [facility]

## 2017-10-21 NOTE — SUBJECTIVE & OBJECTIVE
Interval History: patient feels well today and is eating better;  at the bedside; s/p IR paracentesis yesterday with 4L removed  - CT triple phase shows 1 cm mass, suspected HCC; under going liver transplant evaluation       Review of Systems   Constitutional: Negative for chills and fever.   HENT: Negative for rhinorrhea and sore throat.    Eyes: Negative for pain and discharge.   Respiratory: Negative for cough and shortness of breath.    Cardiovascular: Negative for chest pain and leg swelling.   Gastrointestinal: Positive for abdominal distention. Negative for abdominal pain, blood in stool, nausea and vomiting.   Endocrine: Negative for cold intolerance and heat intolerance.   Genitourinary: Negative for dysuria and flank pain.   Neurological: Negative for dizziness and numbness.   Psychiatric/Behavioral: Negative for behavioral problems and confusion.     Objective:     Vital Signs (Most Recent):  Temp: 97.8 °F (36.6 °C) (10/21/17 1619)  Pulse: 101 (10/21/17 1619)  Resp: 12 (10/21/17 1619)  BP: 124/68 (10/21/17 1619)  SpO2: 99 % (10/21/17 1619) Vital Signs (24h Range):  Temp:  [97.6 °F (36.4 °C)-98.9 °F (37.2 °C)] 97.8 °F (36.6 °C)  Pulse:  [] 101  Resp:  [12-18] 12  SpO2:  [96 %-100 %] 99 %  BP: (123-136)/(65-76) 124/68     Weight: 74 kg (163 lb 2.3 oz)  Body mass index is 27.15 kg/m².    Intake/Output Summary (Last 24 hours) at 10/21/17 1836  Last data filed at 10/21/17 1700   Gross per 24 hour   Intake             1240 ml   Output                0 ml   Net             1240 ml      Physical Exam   Constitutional: She is oriented to person, place, and time. She appears well-developed and well-nourished.   HENT:   Head: Normocephalic and atraumatic.   Eyes: Conjunctivae are normal. Pupils are equal, round, and reactive to light. Scleral icterus is present.   Neck: Normal range of motion. No thyromegaly present.   Cardiovascular: Normal rate, regular rhythm and normal heart sounds.     Pulmonary/Chest: Effort normal and breath sounds normal.   Abdominal: Soft. She exhibits distension. There is no tenderness.   Musculoskeletal: Normal range of motion. She exhibits no edema.   Neurological: She is alert and oriented to person, place, and time.   Skin: No erythema. No pallor.       MELD-Na score: 27 at 10/21/2017  3:34 AM  MELD score: 22 at 10/21/2017  3:34 AM  Calculated from:  Serum Creatinine: 0.8 mg/dL (Rounded to 1) at 10/21/2017  3:34 AM  Serum Sodium: 128 mmol/L at 10/21/2017  3:34 AM  Total Bilirubin: 10.2 mg/dL at 10/21/2017  3:34 AM  INR(ratio): 1.9 at 10/21/2017  3:34 AM  Age: 52 years    Significant Labs:  CBC:    Recent Labs  Lab 10/20/17  0413 10/21/17  0334   WBC 13.56* 11.67   HGB 10.1* 9.8*   HCT 28.5* 28.1*   * 118*     CMP:    Recent Labs  Lab 10/19/17  2129 10/20/17  0413 10/21/17  0334   * 129* 128*   K 3.0* 3.0* 3.0*   CL 93* 94* 94*   CO2 26 24 22*    109 85   BUN 10 11 9   CREATININE 0.9 0.9 0.8   CALCIUM 8.4* 8.1* 8.1*   PROT 6.8 6.6 6.3   ALBUMIN 2.1* 2.0* 1.8*   BILITOT 12.1* 11.8* 10.2*   ALKPHOS 155* 153* 151*   AST 63* 57* 52*   ALT 24 23 20   ANIONGAP 11 11 12   EGFRNONAA >60.0 >60.0 >60.0     PTINR:    Recent Labs  Lab 10/20/17  0413 10/21/17  0334   INR 2.0* 1.9*       Significant Procedures:   Dobutamine Stress Test with Color Flow: No results found for this or any previous visit.

## 2017-10-21 NOTE — ASSESSMENT & PLAN NOTE
Hx of cirrhosis, presumed secondary to PERKINS and Etoh, who presents from clinic with labs concerning for leukocytosis, worsening LFTs and hypokalemia.    Workup for infectious source unrevealing as of now, with SBP negative, normal UA, normal CXR. US doppler fairly unremarkable.  Her history of etoh use varies between providers, and these findings may suggest ongoing Etoh use (leukocytosis, AST > ALT, high Tbili)    DF is 53.    Plan:  PETH test pending  Check CT triple phase - pending read  Increase aldactone to 300mg daily for diuresis (and given low K)  Check AFP  Continue lactulose and rifixamin  Aggressively replete Mag with K  Strict I/ Os ; 1 L fluid restrict    Pt approved for liver transplant evaluation as inpatient.  Addiction psych has deemed patient HIGH risk and advises program for rehab.

## 2017-10-21 NOTE — ASSESSMENT & PLAN NOTE
PERKINS and Alcoholic with ascites  MELD-Na score: 27 at 10/21/2017  3:34 AM  MELD score: 22 at 10/21/2017  3:34 AM  Calculated from:  Serum Creatinine: 0.8 mg/dL (Rounded to 1) at 10/21/2017  3:34 AM  Serum Sodium: 128 mmol/L at 10/21/2017  3:34 AM  Total Bilirubin: 10.2 mg/dL at 10/21/2017  3:34 AM  INR(ratio): 1.9 at 10/21/2017  3:34 AM  Age: 52 years  - hepatology consult  - unclear what caused recent decompensation, however based on elevated WBC and LFT pattern, it seems like patient may still be drinking; had a tap in the ED, negative for SBP  -  PETH pending  - addiction psych consult, they state high risk will re-evaluate after IOP and AA meetings  - patient has been approved for inpatient liver transplant evaluation   - IR paracentesis done on 10/20 with 4 L removed

## 2017-10-21 NOTE — PLAN OF CARE
Problem: Patient Care Overview  Goal: Plan of Care Review  Outcome: Ongoing (interventions implemented as appropriate)  No acute changes overnight. Pt started back up on lactulose this morning. Pt denies pain. No fall or injury during shift. Blood glucose monitoring performed. Family at bedside. Will continue to monitor.

## 2017-10-21 NOTE — PLAN OF CARE
"Problem: Pressure Ulcer Risk (Clinton Scale) (Adult,Obstetrics,Pediatric)  Goal: Identify Related Risk Factors and Signs and Symptoms  Related risk factors and signs and symptoms are identified upon initiation of Human Response Clinical Practice Guideline (CPG)   Outcome: Ongoing (interventions implemented as appropriate)  Pt resting in bed, SO at bedside.  VSS. 1 BM this shift, voiding without difficulty, appetite slightly better than yesterday per pt.  She states "feeling better today"  "less tired", ambulted to bathroom with standby assist, accuchecks per  Flowsheet.  Education re: medications, POC, safety, and fall risk completed. Call light within reach, pt denies further needs at this time.       "

## 2017-10-21 NOTE — SUBJECTIVE & OBJECTIVE
Interval History:     Feels better already. No complaints.    Current Facility-Administered Medications   Medication    acetaminophen tablet 650 mg    cetirizine tablet 10 mg    dextrose 50% injection 12.5 g    dextrose 50% injection 25 g    furosemide tablet 40 mg    glucagon (human recombinant) injection 1 mg    glucose chewable tablet 16 g    glucose chewable tablet 24 g    influenza (FLUZONE,FLUARIX QUADRIVALENT) vaccine 0.5 mL    lactulose 20 gram/30 mL solution Soln 15 g    omnipaque oral solution 15 mL    ondansetron disintegrating tablet 8 mg    pantoprazole EC tablet 40 mg    phytonadione vitamin k (AQUA-MEPHYTON) 10 mg in dextrose 5 % 50 mL IVPB    spironolactone tablet 100 mg       Objective:     Vital Signs (Most Recent):  Temp: 98.1 °F (36.7 °C) (10/21/17 0814)  Pulse: 98 (10/21/17 1100)  Resp: 16 (10/21/17 0814)  BP: 136/65 (10/21/17 0814)  SpO2: 99 % (10/21/17 0814) Vital Signs (24h Range):  Temp:  [97.6 °F (36.4 °C)-98.2 °F (36.8 °C)] 98.1 °F (36.7 °C)  Pulse:  [] 98  Resp:  [16-18] 16  SpO2:  [96 %-100 %] 99 %  BP: (123-137)/(58-82) 136/65     Weight: 74 kg (163 lb 2.3 oz) (10/20/17 1300)  Body mass index is 27.15 kg/m².    Physical Exam   Constitutional: She is oriented to person, place, and time. No distress.   HENT:   Head: Normocephalic and atraumatic.   Eyes: Scleral icterus is present.   Cardiovascular: Normal rate and regular rhythm.    Pulmonary/Chest: Breath sounds normal. No respiratory distress.   Abdominal: Soft. She exhibits no distension. There is no tenderness. There is no guarding.   Neurological: She is alert and oriented to person, place, and time.   More appropriate with reesponses. Improved mental status   Skin: Skin is warm and dry. No rash noted.   Vitals reviewed.      MELD-Na score: 27 at 10/21/2017  3:34 AM  MELD score: 22 at 10/21/2017  3:34 AM  Calculated from:  Serum Creatinine: 0.8 mg/dL (Rounded to 1) at 10/21/2017  3:34 AM  Serum Sodium: 128 mmol/L  at 10/21/2017  3:34 AM  Total Bilirubin: 10.2 mg/dL at 10/21/2017  3:34 AM  INR(ratio): 1.9 at 10/21/2017  3:34 AM  Age: 52 years    Significant Labs:  CBC:   Recent Labs  Lab 10/21/17  0334   WBC 11.67   RBC 3.76*   HGB 9.8*   HCT 28.1*   *     BMP:   Recent Labs  Lab 10/21/17  0334   GLU 85   *   K 3.0*   CL 94*   CO2 22*   BUN 9   CREATININE 0.8   CALCIUM 8.1*     CMP:   Recent Labs  Lab 10/21/17  0334   GLU 85   CALCIUM 8.1*   ALBUMIN 1.8*   PROT 6.3   *   K 3.0*   CO2 22*   CL 94*   BUN 9   CREATININE 0.8   ALKPHOS 151*   ALT 20   AST 52*   BILITOT 10.2*     Coagulation:   Recent Labs  Lab 10/21/17  0334   INR 1.9*       Significant Imaging:  Labs: Reviewed

## 2017-10-21 NOTE — PLAN OF CARE
Problem: Patient Care Overview  Goal: Plan of Care Review  Outcome: Ongoing (interventions implemented as appropriate)  Pt AAOx4, VSS. Paracentesis done with 4L removed. CT abd completed. Pt was kept NPO until CT abd completed. No acute changes during shift.

## 2017-10-22 LAB
ALBUMIN SERPL BCP-MCNC: 2.2 G/DL
ALP SERPL-CCNC: 162 U/L
ALT SERPL W/O P-5'-P-CCNC: 21 U/L
ANION GAP SERPL CALC-SCNC: 11 MMOL/L
AST SERPL-CCNC: 60 U/L
BASOPHILS # BLD AUTO: 0.08 K/UL
BASOPHILS NFR BLD: 0.7 %
BILIRUB SERPL-MCNC: 9.8 MG/DL
BUN SERPL-MCNC: 6 MG/DL
CALCIUM SERPL-MCNC: 8.4 MG/DL
CHLORIDE SERPL-SCNC: 92 MMOL/L
CO2 SERPL-SCNC: 26 MMOL/L
CREAT SERPL-MCNC: 0.8 MG/DL
DIFFERENTIAL METHOD: ABNORMAL
EOSINOPHIL # BLD AUTO: 0.3 K/UL
EOSINOPHIL NFR BLD: 3.1 %
ERYTHROCYTE [DISTWIDTH] IN BLOOD BY AUTOMATED COUNT: 25.6 %
EST. GFR  (AFRICAN AMERICAN): >60 ML/MIN/1.73 M^2
EST. GFR  (NON AFRICAN AMERICAN): >60 ML/MIN/1.73 M^2
GLUCOSE SERPL-MCNC: 99 MG/DL
HCT VFR BLD AUTO: 29.5 %
HGB BLD-MCNC: 10.5 G/DL
IMM GRANULOCYTES # BLD AUTO: 0.05 K/UL
IMM GRANULOCYTES NFR BLD AUTO: 0.5 %
INR PPP: 1.9
LYMPHOCYTES # BLD AUTO: 2.4 K/UL
LYMPHOCYTES NFR BLD: 21.7 %
MAGNESIUM SERPL-MCNC: 1.3 MG/DL
MCH RBC QN AUTO: 26.9 PG
MCHC RBC AUTO-ENTMCNC: 35.6 G/DL
MCV RBC AUTO: 75 FL
MONOCYTES # BLD AUTO: 1.3 K/UL
MONOCYTES NFR BLD: 11.5 %
NEUTROPHILS # BLD AUTO: 6.8 K/UL
NEUTROPHILS NFR BLD: 62.5 %
NRBC BLD-RTO: 0 /100 WBC
PHOSPHATE SERPL-MCNC: 2.1 MG/DL
PLATELET # BLD AUTO: 123 K/UL
PMV BLD AUTO: 9.8 FL
POCT GLUCOSE: 108 MG/DL (ref 70–110)
POCT GLUCOSE: 122 MG/DL (ref 70–110)
POCT GLUCOSE: 139 MG/DL (ref 70–110)
POCT GLUCOSE: 147 MG/DL (ref 70–110)
POCT GLUCOSE: 150 MG/DL (ref 70–110)
POTASSIUM SERPL-SCNC: 2.7 MMOL/L
PROT SERPL-MCNC: 6.8 G/DL
PROTHROMBIN TIME: 18.7 SEC
RBC # BLD AUTO: 3.91 M/UL
SODIUM SERPL-SCNC: 129 MMOL/L
WBC # BLD AUTO: 10.94 K/UL

## 2017-10-22 PROCEDURE — 36415 COLL VENOUS BLD VENIPUNCTURE: CPT | Mod: NTX

## 2017-10-22 PROCEDURE — 20600001 HC STEP DOWN PRIVATE ROOM: Mod: NTX

## 2017-10-22 PROCEDURE — 63600175 PHARM REV CODE 636 W HCPCS: Mod: NTX | Performed by: HOSPITALIST

## 2017-10-22 PROCEDURE — 80053 COMPREHEN METABOLIC PANEL: CPT | Mod: NTX

## 2017-10-22 PROCEDURE — 85025 COMPLETE CBC W/AUTO DIFF WBC: CPT | Mod: NTX

## 2017-10-22 PROCEDURE — 84100 ASSAY OF PHOSPHORUS: CPT | Mod: NTX

## 2017-10-22 PROCEDURE — 83735 ASSAY OF MAGNESIUM: CPT | Mod: NTX

## 2017-10-22 PROCEDURE — 99233 SBSQ HOSP IP/OBS HIGH 50: CPT | Mod: NTX,,, | Performed by: HOSPITALIST

## 2017-10-22 PROCEDURE — 85610 PROTHROMBIN TIME: CPT | Mod: NTX

## 2017-10-22 PROCEDURE — 25000003 PHARM REV CODE 250: Mod: NTX | Performed by: HOSPITALIST

## 2017-10-22 PROCEDURE — 25000003 PHARM REV CODE 250: Mod: NTX | Performed by: INTERNAL MEDICINE

## 2017-10-22 PROCEDURE — 99233 SBSQ HOSP IP/OBS HIGH 50: CPT | Mod: NTX,,, | Performed by: INTERNAL MEDICINE

## 2017-10-22 RX ORDER — LACTULOSE 10 G/15ML
30 SOLUTION ORAL 4 TIMES DAILY
Status: DISCONTINUED | OUTPATIENT
Start: 2017-10-22 | End: 2017-10-25 | Stop reason: HOSPADM

## 2017-10-22 RX ORDER — SODIUM,POTASSIUM PHOSPHATES 280-250MG
1 POWDER IN PACKET (EA) ORAL
Status: DISCONTINUED | OUTPATIENT
Start: 2017-10-22 | End: 2017-10-25 | Stop reason: HOSPADM

## 2017-10-22 RX ORDER — FUROSEMIDE 40 MG/1
40 TABLET ORAL 2 TIMES DAILY
Status: DISCONTINUED | OUTPATIENT
Start: 2017-10-22 | End: 2017-10-22

## 2017-10-22 RX ORDER — POTASSIUM CHLORIDE 20 MEQ/1
60 TABLET, EXTENDED RELEASE ORAL ONCE
Status: COMPLETED | OUTPATIENT
Start: 2017-10-22 | End: 2017-10-22

## 2017-10-22 RX ORDER — FUROSEMIDE 40 MG/1
40 TABLET ORAL DAILY
Status: DISCONTINUED | OUTPATIENT
Start: 2017-10-23 | End: 2017-10-24

## 2017-10-22 RX ADMIN — POTASSIUM & SODIUM PHOSPHATES POWDER PACK 280-160-250 MG 1 PACKET: 280-160-250 PACK at 11:10

## 2017-10-22 RX ADMIN — CETIRIZINE HYDROCHLORIDE 10 MG: 10 TABLET, FILM COATED ORAL at 08:10

## 2017-10-22 RX ADMIN — PANTOPRAZOLE SODIUM 40 MG: 40 TABLET, DELAYED RELEASE ORAL at 08:10

## 2017-10-22 RX ADMIN — LACTULOSE 30 G: 20 SOLUTION ORAL at 11:10

## 2017-10-22 RX ADMIN — FUROSEMIDE 40 MG: 40 TABLET ORAL at 08:10

## 2017-10-22 RX ADMIN — PHYTONADIONE 10 MG: 10 INJECTION, EMULSION INTRAMUSCULAR; INTRAVENOUS; SUBCUTANEOUS at 08:10

## 2017-10-22 RX ADMIN — POTASSIUM CHLORIDE 60 MEQ: 1500 TABLET, EXTENDED RELEASE ORAL at 11:10

## 2017-10-22 RX ADMIN — MAGNESIUM SULFATE HEPTAHYDRATE 3 G: 500 INJECTION, SOLUTION INTRAMUSCULAR; INTRAVENOUS at 12:10

## 2017-10-22 RX ADMIN — LACTULOSE 15 G: 20 SOLUTION ORAL at 02:10

## 2017-10-22 RX ADMIN — SPIRONOLACTONE 300 MG: 100 TABLET ORAL at 08:10

## 2017-10-22 RX ADMIN — POTASSIUM & SODIUM PHOSPHATES POWDER PACK 280-160-250 MG 1 PACKET: 280-160-250 PACK at 04:10

## 2017-10-22 RX ADMIN — POTASSIUM CHLORIDE 60 MEQ: 1500 TABLET, EXTENDED RELEASE ORAL at 02:10

## 2017-10-22 RX ADMIN — LACTULOSE 15 G: 20 SOLUTION ORAL at 05:10

## 2017-10-22 NOTE — ASSESSMENT & PLAN NOTE
PERKINS and Alcoholic with ascites  MELD-Na score: 27 at 10/22/2017  6:10 AM  MELD score: 22 at 10/22/2017  6:10 AM  Calculated from:  Serum Creatinine: 0.8 mg/dL (Rounded to 1) at 10/22/2017  6:10 AM  Serum Sodium: 129 mmol/L at 10/22/2017  6:10 AM  Total Bilirubin: 9.8 mg/dL at 10/22/2017  6:10 AM  INR(ratio): 1.9 at 10/22/2017  6:10 AM  Age: 52 years  - hepatology consult  - unclear what caused recent decompensation, however based on elevated WBC and LFT pattern, it seems like patient may still be drinking; had a tap in the ED, negative for SBP  -  PETH pending  - addiction psych consult, they state high risk will re-evaluate after IOP and AA meetings  - patient has been approved for inpatient liver transplant evaluation   - IR paracentesis done on 10/20 with 4 L removed   - getting 2d echo with CFD tomorrow and dobutamine stress test

## 2017-10-22 NOTE — PROGRESS NOTES
[facility]  Hepatology  Progress Note    Patient Name: Marcie Bazan  MRN: [id number]  Admission Date: 10/19/2017  Hospital Length of Stay: 3 days  Attending Provider: Burton Vora MD   Primary Care Physician: ODILIA Wall  Principal Problem:Decompensated hepatic cirrhosis    Subjective:     Transplant status: No    HPI: This is a 51 y/o female with hx of cirrhosis, previously attributed to PERKINS but suspicion for etoh component as well, complicated by small EV, ascites who presents as admission from Dr. Souza in Hepatology clinic for decompensated cirrhosis.  Noted to have hypokalemia and elevated WBC in clinic thus sent to ER.   Went to clinic and states she has been otherwise in her USOH. She has no complaints this morning.   Pt reports symptoms of fatigue, poor appetite, abdominal distention, abd pain, constipation, problems urinating and generalized weakness.    Has told other providers that she has not had a drink in last 6 months, however tells me that she has not had a drink in at least 1 year.  She states she used to drink daily with a friend, but since that friend has passed away, she has not drank. She states she isnt sure how her drinking caused liver disease because they never drank 'that much'    Records reports EGD in August 2016 that showed small esophageal varices with severe gastritis.    Colonoscopy 2015 with 2 polyps and internal hemorrhoids  In the ER, tap was negative for SBP.    US doppler performed as well:  Decreased Doppler signal in the distal main portal vein and left and right portal veins which may reflect slow flow with no definite thrombus visualized.  Three-phase contrast-enhanced CT may be performed for further evaluation of the portal venous system as warranted.  Findings compatible cirrhosis and portal hypertension, including nodular heterogeneous liver and large volume of ascites.  Status post cholecystectomy.        Interval History:      Feeling better.     K still low.   No events.    Current Facility-Administered Medications   Medication    acetaminophen tablet 650 mg    cetirizine tablet 10 mg    dextrose 50% injection 12.5 g    dextrose 50% injection 25 g    furosemide tablet 40 mg    glucagon (human recombinant) injection 1 mg    glucose chewable tablet 16 g    glucose chewable tablet 24 g    influenza (FLUZONE,FLUARIX QUADRIVALENT) vaccine 0.5 mL    lactulose 20 gram/30 mL solution Soln 15 g    magnesium sulfate 3 g in dextrose 5 % 250 mL IVPB    omnipaque oral solution 15 mL    ondansetron disintegrating tablet 8 mg    pantoprazole EC tablet 40 mg    potassium chloride SA CR tablet 60 mEq    potassium chloride SA CR tablet 60 mEq    potassium, sodium phosphates 280-160-250 mg packet 1 packet    spironolactone tablet 300 mg       Objective:     Vital Signs (Most Recent):  Temp: 98.5 °F (36.9 °C) (10/22/17 0733)  Pulse: 100 (10/22/17 0733)  Resp: 18 (10/22/17 0733)  BP: 125/66 (10/22/17 0733)  SpO2: 98 % (10/22/17 0733) Vital Signs (24h Range):  Temp:  [97.8 °F (36.6 °C)-99 °F (37.2 °C)] 98.5 °F (36.9 °C)  Pulse:  [] 100  Resp:  [12-18] 18  SpO2:  [95 %-99 %] 98 %  BP: (123-135)/(66-76) 125/66     Weight: 71.9 kg (158 lb 6.4 oz) (10/22/17 0400)  Body mass index is 26.36 kg/m².    Physical Exam   Constitutional: She is oriented to person, place, and time. No distress.   HENT:   Head: Normocephalic and atraumatic.   Eyes: Scleral icterus is present.   Cardiovascular: Normal rate and regular rhythm.    Pulmonary/Chest: Breath sounds normal. No respiratory distress.   Abdominal: Soft. She exhibits no distension. There is no tenderness. There is no guarding.   Neurological: She is alert and oriented to person, place, and time.   Alert and oriented   Skin: Skin is warm and dry. No rash noted.   Vitals reviewed.      MELD-Na score: 27 at 10/22/2017  6:10 AM  MELD score: 22 at 10/22/2017  6:10 AM  Calculated from:  Serum  Creatinine: 0.8 mg/dL (Rounded to 1) at 10/22/2017  6:10 AM  Serum Sodium: 129 mmol/L at 10/22/2017  6:10 AM  Total Bilirubin: 9.8 mg/dL at 10/22/2017  6:10 AM  INR(ratio): 1.9 at 10/22/2017  6:10 AM  Age: 52 years    Significant Labs:  CBC:   Recent Labs  Lab 10/22/17  0610   WBC 10.94   RBC 3.91*   HGB 10.5*   HCT 29.5*   *     BMP:   Recent Labs  Lab 10/22/17  0610   GLU 99   *   K 2.7*   CL 92*   CO2 26   BUN 6   CREATININE 0.8   CALCIUM 8.4*     CMP:   Recent Labs  Lab 10/22/17  0610   GLU 99   CALCIUM 8.4*   ALBUMIN 2.2*   PROT 6.8   *   K 2.7*   CO2 26   CL 92*   BUN 6   CREATININE 0.8   ALKPHOS 162*   ALT 21   AST 60*   BILITOT 9.8*     Coagulation:   Recent Labs  Lab 10/22/17  0610   INR 1.9*       Significant Imaging:  Labs: Reviewed    Assessment/Plan:     * Decompensated hepatic cirrhosis    Hx of cirrhosis, presumed secondary to PERKINS and Etoh, who presents from clinic with labs concerning for leukocytosis, worsening LFTs and hypokalemia.    Workup for infectious source unrevealing as of now, with SBP negative, normal UA, normal CXR. US doppler fairly unremarkable.  Her history of etoh use varies between providers, and these findings may suggest ongoing Etoh use (leukocytosis, AST > ALT, high Tbili)    CT TP showing indeterminate 1 cm lesions, cannot rule out HCC.  Will need repeat CT TP in 3 months    Plan:  PETH test pending  aldactone 300mg daily for diuresis (and given low K)  Check AFP  Continue lactulose and rifixamin  Aggressively replete Mag with K  Strict I/ Os ; 1 L fluid restrict    Pt approved for liver transplant evaluation as inpatient.  Addiction psych has deemed patient HIGH risk and advises program for rehab.  Likely home in next 24 - 48 hours with correction of K.  Will need outpatient alcohol program and repeat CT TP in 3 months            Thank you for your consult. I will follow-up with patient. Please contact us if you have any additional questions.    Stalin RON  MD Gio  Hepatology  [facility]

## 2017-10-22 NOTE — PROGRESS NOTES
[facility] Hospital Medicine  Progress Note    Patient Name: Marcie Bazan  MRN: [id number]  Patient Class: IP- Inpatient   Admission Date: 10/19/2017  Length of Stay: 3 days  Attending Physician: Burton Vora MD  Primary Care Provider: Milton Ferrer, [facility] Hospital Medicine Team: [facility]OS[facility] MED A Burton Vora MD    Subjective:     Principal Problem:Decompensated hepatic cirrhosis    HPI:  53 y/o AAF with PMHx of HTN and cirrhosis presents to the ED for admission. She was seen in the liver transplant clinic for eval for liver transplant. She was noted to have hypokalemia and leukocytosis so was sent to the ED for further evaluation. She reports fatigue, decreased appetite, chills, abdominal distention, abdominal pain, constipation, difficulty urinating, lightheadedness. She has PRN paracentesis (last in early October). She reports that she is taking lactulose. She is on potassium supplementation but is unable to tolerate liquid potassium (causes emesis), she was changed to K tablets today (has not yet filled this). She denies fever, chest pain, diarrhea, dysuria, LE edema. She states heavy use of alcohol in the past, but denies any recent use in past six months and denies tobacco or drug use.      Hospital Course:  No notes on file    Interval History: patient continues to improve, MELD at 27; undergoing liver transplant evaluation; can likely be d/roula in a day or two; will get dobutamine stress test and 2d echo tomorrow; patient is willing to do outpatient IOP for her alcohol abuse; prefers in  where she is from;       Review of Systems   Constitutional: Negative for chills and fever.   HENT: Negative for rhinorrhea and sore throat.    Eyes: Negative for pain and discharge.   Respiratory: Negative for cough and shortness of breath.    Cardiovascular: Negative for chest pain and leg swelling.   Gastrointestinal: Positive for abdominal distention. Negative for abdominal pain, blood  in stool, nausea and vomiting.   Endocrine: Negative for cold intolerance and heat intolerance.   Genitourinary: Negative for dysuria and flank pain.   Neurological: Negative for dizziness and numbness.   Psychiatric/Behavioral: Negative for behavioral problems and confusion.     Objective:     Vital Signs (Most Recent):  Temp: 98.4 °F (36.9 °C) (10/22/17 1138)  Pulse: 98 (10/22/17 1429)  Resp: 18 (10/22/17 1138)  BP: 139/60 (10/22/17 1138)  SpO2: 98 % (10/22/17 1138) Vital Signs (24h Range):  Temp:  [97.8 °F (36.6 °C)-99 °F (37.2 °C)] 98.4 °F (36.9 °C)  Pulse:  [] 98  Resp:  [12-18] 18  SpO2:  [95 %-99 %] 98 %  BP: (119-139)/(60-72) 139/60     Weight: 71.9 kg (158 lb 6.4 oz)  Body mass index is 26.36 kg/m².    Intake/Output Summary (Last 24 hours) at 10/22/17 1455  Last data filed at 10/22/17 0600   Gross per 24 hour   Intake              960 ml   Output                0 ml   Net              960 ml      Physical Exam   Constitutional: She is oriented to person, place, and time. She appears well-developed and well-nourished.   HENT:   Head: Normocephalic and atraumatic.   Eyes: Conjunctivae are normal. Pupils are equal, round, and reactive to light. Scleral icterus is present.   Neck: Normal range of motion. No thyromegaly present.   Cardiovascular: Normal rate, regular rhythm and normal heart sounds.    Pulmonary/Chest: Effort normal and breath sounds normal.   Abdominal: Soft. She exhibits distension. There is no tenderness.   Musculoskeletal: Normal range of motion. She exhibits no edema.   Neurological: She is alert and oriented to person, place, and time.   Skin: No erythema. No pallor.       MELD-Na score: 27 at 10/22/2017  6:10 AM  MELD score: 22 at 10/22/2017  6:10 AM  Calculated from:  Serum Creatinine: 0.8 mg/dL (Rounded to 1) at 10/22/2017  6:10 AM  Serum Sodium: 129 mmol/L at 10/22/2017  6:10 AM  Total Bilirubin: 9.8 mg/dL at 10/22/2017  6:10 AM  INR(ratio): 1.9 at 10/22/2017  6:10 AM  Age: 52  years    Significant Labs:  CBC:    Recent Labs  Lab 10/21/17  0334 10/22/17  0610   WBC 11.67 10.94   HGB 9.8* 10.5*   HCT 28.1* 29.5*   * 123*     CMP:    Recent Labs  Lab 10/21/17  0334 10/22/17  0610   * 129*   K 3.0* 2.7*   CL 94* 92*   CO2 22* 26   GLU 85 99   BUN 9 6   CREATININE 0.8 0.8   CALCIUM 8.1* 8.4*   PROT 6.3 6.8   ALBUMIN 1.8* 2.2*   BILITOT 10.2* 9.8*   ALKPHOS 151* 162*   AST 52* 60*   ALT 20 21   ANIONGAP 12 11   EGFRNONAA >60.0 >60.0     PTINR:    Recent Labs  Lab 10/21/17  0334 10/22/17  0610   INR 1.9* 1.9*       Significant Procedures:   Dobutamine Stress Test with Color Flow: No results found for this or any previous visit.        Assessment/Plan:      * Decompensated hepatic cirrhosis    PERKINS and Alcoholic with ascites  MELD-Na score: 27 at 10/22/2017  6:10 AM  MELD score: 22 at 10/22/2017  6:10 AM  Calculated from:  Serum Creatinine: 0.8 mg/dL (Rounded to 1) at 10/22/2017  6:10 AM  Serum Sodium: 129 mmol/L at 10/22/2017  6:10 AM  Total Bilirubin: 9.8 mg/dL at 10/22/2017  6:10 AM  INR(ratio): 1.9 at 10/22/2017  6:10 AM  Age: 52 years  - hepatology consult  - unclear what caused recent decompensation, however based on elevated WBC and LFT pattern, it seems like patient may still be drinking; had a tap in the ED, negative for SBP  -  PETH pending  - addiction psych consult, they state high risk will re-evaluate after IOP and AA meetings  - patient has been approved for inpatient liver transplant evaluation   - IR paracentesis done on 10/20 with 4 L removed   - getting 2d echo with CFD tomorrow and dobutamine stress test          HCC (hepatocellular carcinoma)    - as seen on CT triple phase; 1 cm focus on inferior hepatic lobe;           Hepatic encephalopathy    - improved with lactulose; add rifaximin           Severe protein-calorie malnutrition    - has hardly had any appetite in weeks; PAB; dietary and ensure          Alcoholic hepatitis with ascites    - see decompensated  hepatic cirrhosis           Elevated AFP    - no clear mass seen on U/S; possible PVT; will get CT abdomen pelvis triple phase for further evaluation           Hyponatremia    - fluid restriction to 1200 cc          Hypomagnesemia    - replace           Hypokalemia    - replace           Leucocytosis    - no current evidence of infection; U/A and CXR negative; SBP negative; blood cultures negative  - likely due to alcoholic hepatitis           Coagulopathy    - started on vitamin K for 3 days           Ascites    - see above           NAFLD (nonalcoholic fatty liver disease)    - see decompensated cirrhosis           Anemia    Of chronic disease, monitor             VTE Risk Mitigation         Ordered     Medium Risk of VTE  Once      10/19/17 1631     Place sequential compression device  Until discontinued      10/19/17 1510              Burton Vora MD  Department of Hospital Medicine   [facility]

## 2017-10-22 NOTE — SUBJECTIVE & OBJECTIVE
Interval History: patient continues to improve, MELD at 27; undergoing liver transplant evaluation; can likely be d/roula in a day or two; will get dobutamine stress test and 2d echo tomorrow; patient is willing to do outpatient IOP for her alcohol abuse; prefers in  where she is from;       Review of Systems   Constitutional: Negative for chills and fever.   HENT: Negative for rhinorrhea and sore throat.    Eyes: Negative for pain and discharge.   Respiratory: Negative for cough and shortness of breath.    Cardiovascular: Negative for chest pain and leg swelling.   Gastrointestinal: Positive for abdominal distention. Negative for abdominal pain, blood in stool, nausea and vomiting.   Endocrine: Negative for cold intolerance and heat intolerance.   Genitourinary: Negative for dysuria and flank pain.   Neurological: Negative for dizziness and numbness.   Psychiatric/Behavioral: Negative for behavioral problems and confusion.     Objective:     Vital Signs (Most Recent):  Temp: 98.4 °F (36.9 °C) (10/22/17 1138)  Pulse: 98 (10/22/17 1429)  Resp: 18 (10/22/17 1138)  BP: 139/60 (10/22/17 1138)  SpO2: 98 % (10/22/17 1138) Vital Signs (24h Range):  Temp:  [97.8 °F (36.6 °C)-99 °F (37.2 °C)] 98.4 °F (36.9 °C)  Pulse:  [] 98  Resp:  [12-18] 18  SpO2:  [95 %-99 %] 98 %  BP: (119-139)/(60-72) 139/60     Weight: 71.9 kg (158 lb 6.4 oz)  Body mass index is 26.36 kg/m².    Intake/Output Summary (Last 24 hours) at 10/22/17 1455  Last data filed at 10/22/17 0600   Gross per 24 hour   Intake              960 ml   Output                0 ml   Net              960 ml      Physical Exam   Constitutional: She is oriented to person, place, and time. She appears well-developed and well-nourished.   HENT:   Head: Normocephalic and atraumatic.   Eyes: Conjunctivae are normal. Pupils are equal, round, and reactive to light. Scleral icterus is present.   Neck: Normal range of motion. No thyromegaly present.   Cardiovascular: Normal  rate, regular rhythm and normal heart sounds.    Pulmonary/Chest: Effort normal and breath sounds normal.   Abdominal: Soft. She exhibits distension. There is no tenderness.   Musculoskeletal: Normal range of motion. She exhibits no edema.   Neurological: She is alert and oriented to person, place, and time.   Skin: No erythema. No pallor.       MELD-Na score: 27 at 10/22/2017  6:10 AM  MELD score: 22 at 10/22/2017  6:10 AM  Calculated from:  Serum Creatinine: 0.8 mg/dL (Rounded to 1) at 10/22/2017  6:10 AM  Serum Sodium: 129 mmol/L at 10/22/2017  6:10 AM  Total Bilirubin: 9.8 mg/dL at 10/22/2017  6:10 AM  INR(ratio): 1.9 at 10/22/2017  6:10 AM  Age: 52 years    Significant Labs:  CBC:    Recent Labs  Lab 10/21/17  0334 10/22/17  0610   WBC 11.67 10.94   HGB 9.8* 10.5*   HCT 28.1* 29.5*   * 123*     CMP:    Recent Labs  Lab 10/21/17  0334 10/22/17  0610   * 129*   K 3.0* 2.7*   CL 94* 92*   CO2 22* 26   GLU 85 99   BUN 9 6   CREATININE 0.8 0.8   CALCIUM 8.1* 8.4*   PROT 6.3 6.8   ALBUMIN 1.8* 2.2*   BILITOT 10.2* 9.8*   ALKPHOS 151* 162*   AST 52* 60*   ALT 20 21   ANIONGAP 12 11   EGFRNONAA >60.0 >60.0     PTINR:    Recent Labs  Lab 10/21/17  0334 10/22/17  0610   INR 1.9* 1.9*       Significant Procedures:   Dobutamine Stress Test with Color Flow: No results found for this or any previous visit.

## 2017-10-22 NOTE — PLAN OF CARE
Problem: Patient Care Overview  Goal: Plan of Care Review  Outcome: Ongoing (interventions implemented as appropriate)  Pt has remained free from injury this shift. Bed in low locked position. Call light and personal belongings within reach. Side rails up x2. Nonskid socks in place. Pt ambulates in room independently. Shower and linen changed this shift. Pt remains afebrile throughout shift. Magnesium, potassium, and phosphorus replacements given. Pt will be NPO at midnight for a stress test in the AM. BG monitored ACHS; no SSI needed thus far. All questions and concerns addressed at this time. Will continue to monitor.

## 2017-10-22 NOTE — PLAN OF CARE
Problem: Patient Care Overview  Goal: Plan of Care Review  Outcome: Ongoing (interventions implemented as appropriate)  No acute changes overnight. Pt denies pain. No falls or injury during shift. Family at bedside. Will continue to monitor.

## 2017-10-22 NOTE — ASSESSMENT & PLAN NOTE
Hx of cirrhosis, presumed secondary to PERKINS and Etoh, who presents from clinic with labs concerning for leukocytosis, worsening LFTs and hypokalemia.    Workup for infectious source unrevealing as of now, with SBP negative, normal UA, normal CXR. US doppler fairly unremarkable.  Her history of etoh use varies between providers, and these findings may suggest ongoing Etoh use (leukocytosis, AST > ALT, high Tbili)    CT TP showing indeterminate 1 cm lesions, cannot rule out HCC.  Will need repeat CT TP in 3 months    Plan:  PETH test pending  aldactone 300mg daily for diuresis (and given low K)  Check AFP  Continue lactulose and rifixamin  Aggressively replete Mag with K  Strict I/ Os ; 1 L fluid restrict    Pt approved for liver transplant evaluation as inpatient.  Addiction psych has deemed patient HIGH risk and advises program for rehab.  Likely home in next 24 - 48 hours with correction of K.  Will need outpatient alcohol program and repeat CT TP in 3 months

## 2017-10-22 NOTE — SUBJECTIVE & OBJECTIVE
Interval History:     Feeling better.     K still low.   No events.    Current Facility-Administered Medications   Medication    acetaminophen tablet 650 mg    cetirizine tablet 10 mg    dextrose 50% injection 12.5 g    dextrose 50% injection 25 g    furosemide tablet 40 mg    glucagon (human recombinant) injection 1 mg    glucose chewable tablet 16 g    glucose chewable tablet 24 g    influenza (FLUZONE,FLUARIX QUADRIVALENT) vaccine 0.5 mL    lactulose 20 gram/30 mL solution Soln 15 g    magnesium sulfate 3 g in dextrose 5 % 250 mL IVPB    omnipaque oral solution 15 mL    ondansetron disintegrating tablet 8 mg    pantoprazole EC tablet 40 mg    potassium chloride SA CR tablet 60 mEq    potassium chloride SA CR tablet 60 mEq    potassium, sodium phosphates 280-160-250 mg packet 1 packet    spironolactone tablet 300 mg       Objective:     Vital Signs (Most Recent):  Temp: 98.5 °F (36.9 °C) (10/22/17 0733)  Pulse: 100 (10/22/17 0733)  Resp: 18 (10/22/17 0733)  BP: 125/66 (10/22/17 0733)  SpO2: 98 % (10/22/17 0733) Vital Signs (24h Range):  Temp:  [97.8 °F (36.6 °C)-99 °F (37.2 °C)] 98.5 °F (36.9 °C)  Pulse:  [] 100  Resp:  [12-18] 18  SpO2:  [95 %-99 %] 98 %  BP: (123-135)/(66-76) 125/66     Weight: 71.9 kg (158 lb 6.4 oz) (10/22/17 0400)  Body mass index is 26.36 kg/m².    Physical Exam   Constitutional: She is oriented to person, place, and time. No distress.   HENT:   Head: Normocephalic and atraumatic.   Eyes: Scleral icterus is present.   Cardiovascular: Normal rate and regular rhythm.    Pulmonary/Chest: Breath sounds normal. No respiratory distress.   Abdominal: Soft. She exhibits no distension. There is no tenderness. There is no guarding.   Neurological: She is alert and oriented to person, place, and time.   Alert and oriented   Skin: Skin is warm and dry. No rash noted.   Vitals reviewed.      MELD-Na score: 27 at 10/22/2017  6:10 AM  MELD score: 22 at 10/22/2017  6:10  AM  Calculated from:  Serum Creatinine: 0.8 mg/dL (Rounded to 1) at 10/22/2017  6:10 AM  Serum Sodium: 129 mmol/L at 10/22/2017  6:10 AM  Total Bilirubin: 9.8 mg/dL at 10/22/2017  6:10 AM  INR(ratio): 1.9 at 10/22/2017  6:10 AM  Age: 52 years    Significant Labs:  CBC:   Recent Labs  Lab 10/22/17  0610   WBC 10.94   RBC 3.91*   HGB 10.5*   HCT 29.5*   *     BMP:   Recent Labs  Lab 10/22/17  0610   GLU 99   *   K 2.7*   CL 92*   CO2 26   BUN 6   CREATININE 0.8   CALCIUM 8.4*     CMP:   Recent Labs  Lab 10/22/17  0610   GLU 99   CALCIUM 8.4*   ALBUMIN 2.2*   PROT 6.8   *   K 2.7*   CO2 26   CL 92*   BUN 6   CREATININE 0.8   ALKPHOS 162*   ALT 21   AST 60*   BILITOT 9.8*     Coagulation:   Recent Labs  Lab 10/22/17  0610   INR 1.9*       Significant Imaging:  Labs: Reviewed

## 2017-10-23 ENCOUNTER — DOCUMENTATION ONLY (OUTPATIENT)
Dept: CARDIOLOGY | Facility: CLINIC | Age: 52
End: 2017-10-23

## 2017-10-23 ENCOUNTER — DOCUMENTATION ONLY (OUTPATIENT)
Dept: TRANSPLANT | Facility: CLINIC | Age: 52
End: 2017-10-23

## 2017-10-23 LAB
A1AT PHENOTYP SERPL-IMP: NORMAL BANDS
A1AT SERPL NEPH-MCNC: 186 MG/DL
ALBUMIN SERPL BCP-MCNC: 2.1 G/DL
ALP SERPL-CCNC: 158 U/L
ALT SERPL W/O P-5'-P-CCNC: 25 U/L
ANA SER QL IF: NORMAL
ANION GAP SERPL CALC-SCNC: 11 MMOL/L
AST SERPL-CCNC: 65 U/L
BACTERIA SPEC AEROBE CULT: NO GROWTH
BASOPHILS # BLD AUTO: 0.05 K/UL
BASOPHILS NFR BLD: 0.3 %
BILIRUB SERPL-MCNC: 10 MG/DL
BUN SERPL-MCNC: 5 MG/DL
CALCIUM SERPL-MCNC: 8.3 MG/DL
CHLORIDE SERPL-SCNC: 89 MMOL/L
CO2 SERPL-SCNC: 26 MMOL/L
CREAT SERPL-MCNC: 0.8 MG/DL
DIFFERENTIAL METHOD: ABNORMAL
EOSINOPHIL # BLD AUTO: 0.2 K/UL
EOSINOPHIL NFR BLD: 1.2 %
ERYTHROCYTE [DISTWIDTH] IN BLOOD BY AUTOMATED COUNT: 25.2 %
EST. GFR  (AFRICAN AMERICAN): >60 ML/MIN/1.73 M^2
EST. GFR  (NON AFRICAN AMERICAN): >60 ML/MIN/1.73 M^2
GLUCOSE SERPL-MCNC: 105 MG/DL
HCT VFR BLD AUTO: 29.8 %
HGB BLD-MCNC: 10.6 G/DL
IMM GRANULOCYTES # BLD AUTO: 0.11 K/UL
IMM GRANULOCYTES NFR BLD AUTO: 0.8 %
INR PPP: 2
LYMPHOCYTES # BLD AUTO: 1.7 K/UL
LYMPHOCYTES NFR BLD: 11.8 %
MAGNESIUM SERPL-MCNC: 1.2 MG/DL
MCH RBC QN AUTO: 26.8 PG
MCHC RBC AUTO-ENTMCNC: 35.6 G/DL
MCV RBC AUTO: 75 FL
MONOCYTES # BLD AUTO: 1.4 K/UL
MONOCYTES NFR BLD: 9.3 %
NEUTROPHILS # BLD AUTO: 11.2 K/UL
NEUTROPHILS NFR BLD: 76.6 %
NRBC BLD-RTO: 0 /100 WBC
PHOSPHATE SERPL-MCNC: 2.7 MG/DL
PLATELET # BLD AUTO: 121 K/UL
PMV BLD AUTO: 9.4 FL
POCT GLUCOSE: 104 MG/DL (ref 70–110)
POCT GLUCOSE: 113 MG/DL (ref 70–110)
POCT GLUCOSE: 113 MG/DL (ref 70–110)
POTASSIUM SERPL-SCNC: 2.8 MMOL/L
PROT SERPL-MCNC: 6.5 G/DL
PROTHROMBIN TIME: 20.3 SEC
RBC # BLD AUTO: 3.96 M/UL
SODIUM SERPL-SCNC: 126 MMOL/L
WBC # BLD AUTO: 14.54 K/UL

## 2017-10-23 PROCEDURE — 99499 UNLISTED E&M SERVICE: CPT | Mod: NTX,,, | Performed by: PHYSICIAN ASSISTANT

## 2017-10-23 PROCEDURE — 63600175 PHARM REV CODE 636 W HCPCS: Mod: NTX | Performed by: RADIOLOGY

## 2017-10-23 PROCEDURE — 25000003 PHARM REV CODE 250: Mod: NTX | Performed by: HOSPITALIST

## 2017-10-23 PROCEDURE — 36415 COLL VENOUS BLD VENIPUNCTURE: CPT | Mod: NTX

## 2017-10-23 PROCEDURE — 20600001 HC STEP DOWN PRIVATE ROOM: Mod: NTX

## 2017-10-23 PROCEDURE — 80053 COMPREHEN METABOLIC PANEL: CPT | Mod: NTX

## 2017-10-23 PROCEDURE — 86682 HELMINTH ANTIBODY: CPT | Mod: NTX

## 2017-10-23 PROCEDURE — 86665 EPSTEIN-BARR CAPSID VCA: CPT | Mod: NTX

## 2017-10-23 PROCEDURE — 86787 VARICELLA-ZOSTER ANTIBODY: CPT | Mod: NTX

## 2017-10-23 PROCEDURE — 99233 SBSQ HOSP IP/OBS HIGH 50: CPT | Mod: NTX,,, | Performed by: INTERNAL MEDICINE

## 2017-10-23 PROCEDURE — 85610 PROTHROMBIN TIME: CPT | Mod: NTX

## 2017-10-23 PROCEDURE — 84100 ASSAY OF PHOSPHORUS: CPT | Mod: NTX

## 2017-10-23 PROCEDURE — 25500020 PHARM REV CODE 255: Mod: NTX | Performed by: HOSPITALIST

## 2017-10-23 PROCEDURE — 86480 TB TEST CELL IMMUN MEASURE: CPT | Mod: NTX

## 2017-10-23 PROCEDURE — 83735 ASSAY OF MAGNESIUM: CPT | Mod: NTX

## 2017-10-23 PROCEDURE — 97116 GAIT TRAINING THERAPY: CPT | Mod: NTX

## 2017-10-23 PROCEDURE — 99233 SBSQ HOSP IP/OBS HIGH 50: CPT | Mod: NTX,,, | Performed by: HOSPITALIST

## 2017-10-23 PROCEDURE — 63600175 PHARM REV CODE 636 W HCPCS: Mod: NTX | Performed by: HOSPITALIST

## 2017-10-23 PROCEDURE — 85025 COMPLETE CBC W/AUTO DIFF WBC: CPT | Mod: NTX

## 2017-10-23 PROCEDURE — 25000003 PHARM REV CODE 250: Mod: NTX | Performed by: INTERNAL MEDICINE

## 2017-10-23 RX ORDER — POTASSIUM CHLORIDE 20 MEQ/1
60 TABLET, EXTENDED RELEASE ORAL ONCE
Status: COMPLETED | OUTPATIENT
Start: 2017-10-23 | End: 2017-10-23

## 2017-10-23 RX ORDER — POTASSIUM CHLORIDE 20 MEQ/1
60 TABLET, EXTENDED RELEASE ORAL ONCE
Status: DISCONTINUED | OUTPATIENT
Start: 2017-10-23 | End: 2017-10-23

## 2017-10-23 RX ORDER — MIDAZOLAM HYDROCHLORIDE 1 MG/ML
INJECTION INTRAMUSCULAR; INTRAVENOUS CODE/TRAUMA/SEDATION MEDICATION
Status: COMPLETED | OUTPATIENT
Start: 2017-10-23 | End: 2017-10-23

## 2017-10-23 RX ORDER — FENTANYL CITRATE 50 UG/ML
INJECTION, SOLUTION INTRAMUSCULAR; INTRAVENOUS CODE/TRAUMA/SEDATION MEDICATION
Status: COMPLETED | OUTPATIENT
Start: 2017-10-23 | End: 2017-10-23

## 2017-10-23 RX ADMIN — LACTULOSE 30 G: 20 SOLUTION ORAL at 06:10

## 2017-10-23 RX ADMIN — PANTOPRAZOLE SODIUM 40 MG: 40 TABLET, DELAYED RELEASE ORAL at 09:10

## 2017-10-23 RX ADMIN — LACTULOSE 30 G: 20 SOLUTION ORAL at 05:10

## 2017-10-23 RX ADMIN — POTASSIUM & SODIUM PHOSPHATES POWDER PACK 280-160-250 MG 1 PACKET: 280-160-250 PACK at 10:10

## 2017-10-23 RX ADMIN — POTASSIUM & SODIUM PHOSPHATES POWDER PACK 280-160-250 MG 1 PACKET: 280-160-250 PACK at 06:10

## 2017-10-23 RX ADMIN — FUROSEMIDE 40 MG: 40 TABLET ORAL at 09:10

## 2017-10-23 RX ADMIN — CETIRIZINE HYDROCHLORIDE 10 MG: 10 TABLET, FILM COATED ORAL at 09:10

## 2017-10-23 RX ADMIN — MIDAZOLAM HYDROCHLORIDE 1 MG: 1 INJECTION, SOLUTION INTRAMUSCULAR; INTRAVENOUS at 04:10

## 2017-10-23 RX ADMIN — SPIRONOLACTONE 300 MG: 100 TABLET ORAL at 09:10

## 2017-10-23 RX ADMIN — POTASSIUM BICARBONATE 50 MEQ: 25 TABLET, EFFERVESCENT ORAL at 06:10

## 2017-10-23 RX ADMIN — IOHEXOL 25 ML: 300 INJECTION, SOLUTION INTRAVENOUS at 05:10

## 2017-10-23 RX ADMIN — POTASSIUM CHLORIDE 60 MEQ: 1500 TABLET, EXTENDED RELEASE ORAL at 11:10

## 2017-10-23 RX ADMIN — POTASSIUM & SODIUM PHOSPHATES POWDER PACK 280-160-250 MG 1 PACKET: 280-160-250 PACK at 11:10

## 2017-10-23 RX ADMIN — POTASSIUM BICARBONATE 50 MEQ: 25 TABLET, EFFERVESCENT ORAL at 10:10

## 2017-10-23 RX ADMIN — MAGNESIUM SULFATE HEPTAHYDRATE 3 G: 500 INJECTION, SOLUTION INTRAMUSCULAR; INTRAVENOUS at 11:10

## 2017-10-23 RX ADMIN — LACTULOSE 30 G: 20 SOLUTION ORAL at 11:10

## 2017-10-23 RX ADMIN — FENTANYL CITRATE 50 MCG: 50 INJECTION, SOLUTION INTRAMUSCULAR; INTRAVENOUS at 04:10

## 2017-10-23 NOTE — ASSESSMENT & PLAN NOTE
Hx of cirrhosis, presumed secondary to PERKINS and Etoh, who presents from clinic with labs concerning for leukocytosis, worsening LFTs and hypokalemia.    Workup for infectious source unrevealing as of now, with SBP negative, normal UA, normal CXR. US doppler fairly unremarkable.  Her history of etoh use varies between providers, and these findings may suggest ongoing Etoh use (leukocytosis, AST > ALT, high Tbili)    CT TP showing indeterminate 1 cm lesions, cannot rule out HCC.  Will need repeat CT TP in 3 months    PETH returned positive. Pt adamantly denies drinking etoh in the past months.    Plan:  Will ask addiction pysch for discuss with patient the positive PETH and denial of etoh  aldactone 300mg daily for diuresis (and given low K)  Continue lactulose and rifixamin  Aggressively replete Mag with K  Strict I/ Os ; 1 L fluid restrict    Pt approved for liver transplant evaluation as inpatient.   - needs dobutamine stress as well as EGD (unsure of need for colon)  Addiction psych has deemed patient HIGH risk and advises program for rehab.  Likely home in next 24 - 48 hours with correction of K.  Will need outpatient alcohol program and repeat CT TP in 3 months

## 2017-10-23 NOTE — PLAN OF CARE
Problem: Patient Care Overview  Goal: Plan of Care Review  Outcome: Ongoing (interventions implemented as appropriate)  Pt VSS. Pt Mag an K low, both replaced. Plan to do dobutamine stress test & paracentesis tomorrow. Liver biopsy unable to be done. Pt remains free of falls, up ad enid with steady gait. Pt A&O x4 with delayed responses. Pt remains free of wounds. Will continue to monitor.     Problem: Liver Failure, Acute/Chronic (Adult)  Intervention: Monitor/Manage Fluid and Electrolyte Balance  Pt K and Mg replaced today. Pt needs constant reminder of fluid restriction of 1000ml.

## 2017-10-23 NOTE — PLAN OF CARE
Problem: Patient Care Overview  Goal: Plan of Care Review  Outcome: Ongoing (interventions implemented as appropriate)  No acute events occurred overnight.  Pt has been NPO since midnight for dobutamine stress test.  Neuro checks continued q 4 hours.  Pt had no complaints of pain throughout the night.  Pt completely independent and ambulates in room and to the bathroom.  No falls or injuries occurred overnight.  Will continue to monitor.

## 2017-10-23 NOTE — SUBJECTIVE & OBJECTIVE
Interval History:     No events.  Feels better.    She was informed about positive PETH and adamantly denies drinking recently.      Current Facility-Administered Medications   Medication    acetaminophen tablet 650 mg    cetirizine tablet 10 mg    dextrose 50% injection 12.5 g    dextrose 50% injection 25 g    furosemide tablet 40 mg    glucagon (human recombinant) injection 1 mg    glucose chewable tablet 16 g    glucose chewable tablet 24 g    influenza (FLUZONE,FLUARIX QUADRIVALENT) vaccine 0.5 mL    lactulose 20 gram/30 mL solution Soln 30 g    magnesium sulfate 3 g in dextrose 5 % 250 mL IVPB    omnipaque oral solution 15 mL    ondansetron disintegrating tablet 8 mg    pantoprazole EC tablet 40 mg    potassium chloride SA CR tablet 60 mEq    potassium chloride SA CR tablet 60 mEq    potassium, sodium phosphates 280-160-250 mg packet 1 packet    spironolactone tablet 300 mg       Objective:     Vital Signs (Most Recent):  Temp: 98.5 °F (36.9 °C) (10/23/17 0733)  Pulse: 102 (10/23/17 1037)  Resp: 16 (10/23/17 0733)  BP: (!) 104/59 (10/23/17 0733)  SpO2: 99 % (10/23/17 0733) Vital Signs (24h Range):  Temp:  [98.4 °F (36.9 °C)-98.8 °F (37.1 °C)] 98.5 °F (36.9 °C)  Pulse:  [] 102  Resp:  [15-18] 16  SpO2:  [97 %-99 %] 99 %  BP: (104-139)/(59-74) 104/59     Weight: 71.9 kg (158 lb 6.4 oz) (10/22/17 0400)  Body mass index is 26.36 kg/m².    Physical Exam   Constitutional: She is oriented to person, place, and time. No distress.   HENT:   Head: Normocephalic and atraumatic.   Eyes: Scleral icterus is present.   Cardiovascular: Normal rate and regular rhythm.    Pulmonary/Chest: Breath sounds normal. No respiratory distress.   Abdominal: Soft. She exhibits no distension. There is no tenderness. There is no guarding.   Neurological: She is alert and oriented to person, place, and time.   Skin: Skin is warm and dry. No rash noted.   Psychiatric: She has a normal mood and affect.   Vitals  reviewed.      MELD-Na score: 29 at 10/23/2017  5:24 AM  MELD score: 23 at 10/23/2017  5:24 AM  Calculated from:  Serum Creatinine: 0.8 mg/dL (Rounded to 1) at 10/23/2017  5:24 AM  Serum Sodium: 126 mmol/L at 10/23/2017  5:24 AM  Total Bilirubin: 10.0 mg/dL at 10/23/2017  5:24 AM  INR(ratio): 2.0 at 10/23/2017  5:23 AM  Age: 52 years    Significant Labs:  CBC:   Recent Labs  Lab 10/23/17  0523   WBC 14.54*   RBC 3.96*   HGB 10.6*   HCT 29.8*   *     BMP:   Recent Labs  Lab 10/23/17  0524      *   K 2.8*   CL 89*   CO2 26   BUN 5*   CREATININE 0.8   CALCIUM 8.3*     CMP:   Recent Labs  Lab 10/23/17  0524      CALCIUM 8.3*   ALBUMIN 2.1*   PROT 6.5   *   K 2.8*   CO2 26   CL 89*   BUN 5*   CREATININE 0.8   ALKPHOS 158*   ALT 25   AST 65*   BILITOT 10.0*     Coagulation:   Recent Labs  Lab 10/23/17  0523   INR 2.0*       Significant Imaging:  Labs: Reviewed

## 2017-10-23 NOTE — PROGRESS NOTES
Unable to do patients DSE today due to hypokalemia.  Reviewed lab results w/ Dr. Martin, DSE cancelled until potassium WNL.  Informed Miriam, she will communicate change in plan of care to patient & MD team.  Will reschedule DSE when potassium greater than 3.5.

## 2017-10-23 NOTE — NURSING
PRE EDUCATION TEACHING NOTE    Marcie Bazan's daughter, Lorraine,  was seen in her mother's hospital room  today.  Handbook on pre-liver transplant information (see outline below) was given to the patient's daughter and time was allowed for questions.  Patient was off the floor for a test at the time of this education.  Patient's daughter drove in from [place] for this meeting so this information was presented to the daughter at this time.    Informed consent signed by patient's daughter and written information given on selection criteria.      LIVER TRANSPLANT WORK-UP EDUCATION  I. NATIONAL REGISTRY LISTING  A. Information for listing  B. Regions  C. Per UNOS, can be listed at more than one center  II. SURGERY  A. Length  B. Complications: bleeding, infection  C. Central lines, drains, Rivera catheter, incision, endotracheal tube, NG tube  D. Transfusions, cell saver  III. SHORT TERM RECOVERY  A. ICU, PICU, Hospital stay  IV. LONG TERM RECOVERY  A. Labs at home  B. Clinic visits  C. Complications: infection, rejection, readmissions  D. Normal immunity and immunosuppression  E. Incidence of re-admit in 1st year  V. REJECTION  A. Incidence  B. Treatment: Solumedrol, Prograf, Thymoglobulin (actions and side effects)  VI. IMMUNOSUPPRESSIVES  A. Prednisone  B. Imuran/Cellcept  C. Cyclosporin/Prograf  D. Rapamune  E. Need for lifetime compliance  F. Actions and side effects  G. Costs  VII. RECURRENCE OF VIRAL HEPATITIS

## 2017-10-23 NOTE — PROCEDURES
Radiology Post Procedure Note:     Procedure: Right hepatic venogram with pressure measurements    (s): Ros    Blood Loss: Minimal    Specimen: None    Findings:   Patient came to IR and under imaging guidance had a right hepatic venogram performed which demonstrated a diminutive caliber of the vein and unable to advance biopsy canula across vein.     Pressure measurements obtained.     Will bring patient back for percutaneous drainage with biopsy.     Stevenson ROWE M.D. personally reviewed and agree with the above dictated note.

## 2017-10-23 NOTE — PROGRESS NOTES
Caregiver and Substance Abuse Update:  KARLA met with pt and pt's daughter, Segundo Bazan ([phone]) in pt's room in order to discuss backup caregivers as pt does not have a stable caregiver plan as of yet. Pt was alert and oriented x 4, lying flat in bed with limited eye contact and limited engagement. Pt was only in the room for a brief period of time while SW discussed importance of patient having a primary and a backup caregiver in order to be considered a transplant candidate. Pt was then taken to the OR for a biopsy. Pt's daughter reports that pt will have a rotation of primary caregivers that include herself, pt's friend Ross Tapia ([phone]) and pt's sister in law Sally Pitcher ([phone]). Pt's daughter verified that she is 8 months pregnant but still committed to providing 24/7 care until birth of her child. Pt's daughter has two children 16 and 13 that would be cared for by her fiance. Daughter aware that pt would need to stay locally post-transplant for 4-6 weeks and states that this would be okay. KARLA stated that it would be important for KARLA to meet with pt's RUDOLPH, Sally if Sally is going to be one of the caregivers who rotates with daughter and friend. Pt's daughter reports that she will get in touch with Sally this afternoon to see if she can come by the hospital tomorrow afternoon to meet with AKRLA. Pt's daughter will contact KARLA as soon as she gets confirmation. KARLA Montenegro LMSW met with pt's friend Ross during initial evaluation last week although it was documented that while he is able to stay with pt the majority of the time, he has half of one foot amputated. KARLA has some concerns about this being a safe plan.  Per daughter, Ross is currently in [place] at a doctor's appointment. Pt's daughter also reported that pt's sister Patricia Bazan ([phone]) and sister Yani Taylor ([phone]) can provide care to pt in order to fill in when others cannot assist. Pt does appear to  have a lot of support from various family members but will need to meet with Sally before assessment can be complete. Pt's daughter requested sitter service listings as an additional resource to fill in when family cannot assist. SW explained that sitter services are useful but should not be primary care for patient. Daughter expressed understanding and will touch base with SW if she and family decide to utilize them.     SW also discussed pt's ETOH use with daughter. SW notified pt's daughter that pt's PETH was positive. Pt's daughter reports that she is aware of this information and reports that pt had not drank anything for at least the last 3 weeks. Regarding pt's ETOH use, pt's daughter reports that pt typically drinks 2-3 Yeti sized cups of half vodka or clear liquor and half juice. Pt's daughter stated that she believed pt drinks on a daily basis. Pt's daughter reports that pt has been drinking this way for at least 15-20 years. SW voiced concerns about pt's report of not drinking for at least 6 months versus PETH results and pt's daughter's report. SW stated that pt will need to do an IOP and AA either before transplant if she is well enough, otherwise team would need to determine if pt would be eligible for transplant and then ETOH treatment after pt is recovered. SW explained that biggest concern team has is inconsistency with pt's alcohol use and concerns that pt not being transparent. Pt's daughter expressed understanding and states that pt will likely agree to attend and IOP and AA. Pt's daughter requested being able to talk with the addiction psych team who recommended IOP and AA as well. KARLA contacted Dr. Avalos ([phone]) who will present to pt's room this afternoon to meet with pt and daughter to discuss further recommendations. Dr. Avalos aware that Dr. Rashid Montes recommended ABU even though pt has Medicaid which does not cover this program. Dr. Avalos reports that he will provide other resources.  SW remains available.

## 2017-10-23 NOTE — ASSESSMENT & PLAN NOTE
PERKINS and Alcoholic with ascites  MELD-Na score: 29 at 10/23/2017  5:24 AM  MELD score: 23 at 10/23/2017  5:24 AM  Calculated from:  Serum Creatinine: 0.8 mg/dL (Rounded to 1) at 10/23/2017  5:24 AM  Serum Sodium: 126 mmol/L at 10/23/2017  5:24 AM  Total Bilirubin: 10.0 mg/dL at 10/23/2017  5:24 AM  INR(ratio): 2.0 at 10/23/2017  5:23 AM  Age: 52 years  - hepatology consult  - unclear what caused recent decompensation, however based on elevated WBC and LFT pattern, it seems like patient may still be drinking; had a tap in the ED, negative for SBP  -  PETH positive; denying alcohol use still  - addiction psych consult, they state high risk will re-evaluate after IOP and AA meetings  - patient has been approved for inpatient liver transplant evaluation   - IR paracentesis done on 10/20 with 4 L removed   - 2d echo and stress test pending; as well as several consults and EGD/C-scope

## 2017-10-23 NOTE — PLAN OF CARE
Problem: Physical Therapy Goal  Goal: Physical Therapy Goal  Goals to be met by: 10/30/2017    Patient will increase functional independence with mobility by performin. Supine to sit with Modified [place]  2. Sit to supine with Modified [place]  3. Sit to stand transfer with Supervision - Met  4. Bed to chair transfer with Supervision using appropriate Ad. - Met  5. Gait  x 100 feet with Minimal Assistance using appropriate AD - Met   Updated: gait x 125 ft Mod I without device.     Outcome: Ongoing (interventions implemented as appropriate)  Goals assessed and updated to reflect progress.    Filemon Feliciano III, DPT, PT  10/23/2017

## 2017-10-23 NOTE — PLAN OF CARE
10/23/17 1530   Discharge Reassessment   Assessment Type Discharge Planning Reassessment   Do you have any problems affording any of your prescribed medications? No   Discharge Plan A Home with family   Discharge Plan B Home with family;Home Health   Can the patient answer the patient profile reliably? (mentation waxes and wanes)   How does the patient rate their overall health at the present time? Poor   Describe the patient's ability to walk at the present time. Minor restrictions or changes   How often would a person be available to care for the patient? Whenever needed   Number of comorbid conditions (as recorded on the chart) Three   During the past month, has the patient often been bothered by feeling down, depressed or hopeless? No   During the past month, has the patient often been bothered by little interest or pleasure in doing things? Yes

## 2017-10-23 NOTE — SUBJECTIVE & OBJECTIVE
Interval History: patient's peth test came back positive; spoke with patient about the positive test and patient denied having any recent alcohol intake; patient states she has been in an out of the hospital the past few weeks and states that she was too sick to even think about alcohol  - dobutamine stress test pushed back until tomorrow due to low potassium; will replace throughout today  - planning on IR transjugular biopsy tomorrow; based on results, will start on prednisone for alcohol hepatitis based on results; continue inpatient liver transplant evaluation; will need IOP therapy on discharge      Review of Systems   Constitutional: Negative for chills and fever.   HENT: Negative for rhinorrhea and sore throat.    Eyes: Negative for pain and discharge.   Respiratory: Negative for cough and shortness of breath.    Cardiovascular: Negative for chest pain and leg swelling.   Gastrointestinal: Positive for abdominal distention. Negative for abdominal pain, blood in stool, nausea and vomiting.   Endocrine: Negative for cold intolerance and heat intolerance.   Genitourinary: Negative for dysuria and flank pain.   Neurological: Negative for dizziness and numbness.   Psychiatric/Behavioral: Negative for behavioral problems and confusion.     Objective:     Vital Signs (Most Recent):  Temp: 98.2 °F (36.8 °C) (10/23/17 1253)  Pulse: 109 (10/23/17 1253)  Resp: 18 (10/23/17 1253)  BP: (!) 116/55 (10/23/17 1253)  SpO2: 98 % (10/23/17 1253) Vital Signs (24h Range):  Temp:  [98.2 °F (36.8 °C)-98.8 °F (37.1 °C)] 98.2 °F (36.8 °C)  Pulse:  [] 109  Resp:  [16-18] 18  SpO2:  [97 %-99 %] 98 %  BP: (104-131)/(55-74) 116/55     Weight: 71.9 kg (158 lb 6.4 oz)  Body mass index is 26.36 kg/m².    Intake/Output Summary (Last 24 hours) at 10/23/17 3663  Last data filed at 10/23/17 0974   Gross per 24 hour   Intake              720 ml   Output                0 ml   Net              720 ml      Physical Exam   Constitutional: She  is oriented to person, place, and time. She appears well-developed and well-nourished.   HENT:   Head: Normocephalic and atraumatic.   Eyes: Conjunctivae are normal. Pupils are equal, round, and reactive to light. Scleral icterus is present.   Neck: Normal range of motion. No thyromegaly present.   Cardiovascular: Normal rate, regular rhythm and normal heart sounds.    Pulmonary/Chest: Effort normal and breath sounds normal.   Abdominal: Soft. She exhibits distension. There is no tenderness.   Musculoskeletal: Normal range of motion. She exhibits no edema.   Neurological: She is alert and oriented to person, place, and time.   Skin: No erythema. No pallor.       MELD-Na score: 29 at 10/23/2017  5:24 AM  MELD score: 23 at 10/23/2017  5:24 AM  Calculated from:  Serum Creatinine: 0.8 mg/dL (Rounded to 1) at 10/23/2017  5:24 AM  Serum Sodium: 126 mmol/L at 10/23/2017  5:24 AM  Total Bilirubin: 10.0 mg/dL at 10/23/2017  5:24 AM  INR(ratio): 2.0 at 10/23/2017  5:23 AM  Age: 52 years    Significant Labs:  CBC:    Recent Labs  Lab 10/22/17  0610 10/23/17  0523   WBC 10.94 14.54*   HGB 10.5* 10.6*   HCT 29.5* 29.8*   * 121*     CMP:    Recent Labs  Lab 10/22/17  0610 10/23/17  0524   * 126*   K 2.7* 2.8*   CL 92* 89*   CO2 26 26   GLU 99 105   BUN 6 5*   CREATININE 0.8 0.8   CALCIUM 8.4* 8.3*   PROT 6.8 6.5   ALBUMIN 2.2* 2.1*   BILITOT 9.8* 10.0*   ALKPHOS 162* 158*   AST 60* 65*   ALT 21 25   ANIONGAP 11 11   EGFRNONAA >60.0 >60.0     PTINR:    Recent Labs  Lab 10/22/17  0610 10/23/17  0523   INR 1.9* 2.0*       Significant Procedures:   Dobutamine Stress Test with Color Flow: No results found for this or any previous visit.

## 2017-10-23 NOTE — PROGRESS NOTES
[facility] Hospital Medicine  Progress Note    Patient Name: Marcie Bazan  MRN: [id number]  Patient Class: IP- Inpatient   Admission Date: 10/19/2017  Length of Stay: 4 days  Attending Physician: Burton Vora MD  Primary Care Provider: Milton Ferrer, [facility] Medicine Team: [facility] [place], [place] HOSP MED A Burton Vora MD    Subjective:     Principal Problem:Decompensated hepatic cirrhosis    HPI:  51 y/o AAF with PMHx of HTN and cirrhosis presents to the ED for admission. She was seen in the liver transplant clinic for eval for liver transplant. She was noted to have hypokalemia and leukocytosis so was sent to the ED for further evaluation. She reports fatigue, decreased appetite, chills, abdominal distention, abdominal pain, constipation, difficulty urinating, lightheadedness. She has PRN paracentesis (last in early October). She reports that she is taking lactulose. She is on potassium supplementation but is unable to tolerate liquid potassium (causes emesis), she was changed to K tablets today (has not yet filled this). She denies fever, chest pain, diarrhea, dysuria, LE edema. She states heavy use of alcohol in the past, but denies any recent use in past six months and denies tobacco or drug use.      Hospital Course:  No notes on file    Interval History: patient's peth test came back positive; spoke with patient about the positive test and patient denied having any recent alcohol intake; patient states she has been in an out of the hospital the past few weeks and states that she was too sick to even think about alcohol  - dobutamine stress test pushed back until tomorrow due to low potassium; will replace throughout today  - planning on IR transjugular biopsy tomorrow; based on results, will start on prednisone for alcohol hepatitis based on results; continue inpatient liver transplant evaluation; will need IOP therapy on discharge      Review of Systems   Constitutional: Negative for  chills and fever.   HENT: Negative for rhinorrhea and sore throat.    Eyes: Negative for pain and discharge.   Respiratory: Negative for cough and shortness of breath.    Cardiovascular: Negative for chest pain and leg swelling.   Gastrointestinal: Positive for abdominal distention. Negative for abdominal pain, blood in stool, nausea and vomiting.   Endocrine: Negative for cold intolerance and heat intolerance.   Genitourinary: Negative for dysuria and flank pain.   Neurological: Negative for dizziness and numbness.   Psychiatric/Behavioral: Negative for behavioral problems and confusion.     Objective:     Vital Signs (Most Recent):  Temp: 98.2 °F (36.8 °C) (10/23/17 1253)  Pulse: 109 (10/23/17 1253)  Resp: 18 (10/23/17 1253)  BP: (!) 116/55 (10/23/17 1253)  SpO2: 98 % (10/23/17 1253) Vital Signs (24h Range):  Temp:  [98.2 °F (36.8 °C)-98.8 °F (37.1 °C)] 98.2 °F (36.8 °C)  Pulse:  [] 109  Resp:  [16-18] 18  SpO2:  [97 %-99 %] 98 %  BP: (104-131)/(55-74) 116/55     Weight: 71.9 kg (158 lb 6.4 oz)  Body mass index is 26.36 kg/m².    Intake/Output Summary (Last 24 hours) at 10/23/17 1453  Last data filed at 10/23/17 0956   Gross per 24 hour   Intake              720 ml   Output                0 ml   Net              720 ml      Physical Exam   Constitutional: She is oriented to person, place, and time. She appears well-developed and well-nourished.   HENT:   Head: Normocephalic and atraumatic.   Eyes: Conjunctivae are normal. Pupils are equal, round, and reactive to light. Scleral icterus is present.   Neck: Normal range of motion. No thyromegaly present.   Cardiovascular: Normal rate, regular rhythm and normal heart sounds.    Pulmonary/Chest: Effort normal and breath sounds normal.   Abdominal: Soft. She exhibits distension. There is no tenderness.   Musculoskeletal: Normal range of motion. She exhibits no edema.   Neurological: She is alert and oriented to person, place, and time.   Skin: No erythema. No  pallor.       MELD-Na score: 29 at 10/23/2017  5:24 AM  MELD score: 23 at 10/23/2017  5:24 AM  Calculated from:  Serum Creatinine: 0.8 mg/dL (Rounded to 1) at 10/23/2017  5:24 AM  Serum Sodium: 126 mmol/L at 10/23/2017  5:24 AM  Total Bilirubin: 10.0 mg/dL at 10/23/2017  5:24 AM  INR(ratio): 2.0 at 10/23/2017  5:23 AM  Age: 52 years    Significant Labs:  CBC:    Recent Labs  Lab 10/22/17  0610 10/23/17  0523   WBC 10.94 14.54*   HGB 10.5* 10.6*   HCT 29.5* 29.8*   * 121*     CMP:    Recent Labs  Lab 10/22/17  0610 10/23/17  0524   * 126*   K 2.7* 2.8*   CL 92* 89*   CO2 26 26   GLU 99 105   BUN 6 5*   CREATININE 0.8 0.8   CALCIUM 8.4* 8.3*   PROT 6.8 6.5   ALBUMIN 2.2* 2.1*   BILITOT 9.8* 10.0*   ALKPHOS 162* 158*   AST 60* 65*   ALT 21 25   ANIONGAP 11 11   EGFRNONAA >60.0 >60.0     PTINR:    Recent Labs  Lab 10/22/17  0610 10/23/17  0523   INR 1.9* 2.0*       Significant Procedures:   Dobutamine Stress Test with Color Flow: No results found for this or any previous visit.        Assessment/Plan:      * Decompensated hepatic cirrhosis    PERKINS and Alcoholic with ascites  MELD-Na score: 29 at 10/23/2017  5:24 AM  MELD score: 23 at 10/23/2017  5:24 AM  Calculated from:  Serum Creatinine: 0.8 mg/dL (Rounded to 1) at 10/23/2017  5:24 AM  Serum Sodium: 126 mmol/L at 10/23/2017  5:24 AM  Total Bilirubin: 10.0 mg/dL at 10/23/2017  5:24 AM  INR(ratio): 2.0 at 10/23/2017  5:23 AM  Age: 52 years  - hepatology consult  - unclear what caused recent decompensation, however based on elevated WBC and LFT pattern, it seems like patient may still be drinking; had a tap in the ED, negative for SBP  -  PETH positive; denying alcohol use still  - addiction psych consult, they state high risk will re-evaluate after IOP and AA meetings  - patient has been approved for inpatient liver transplant evaluation   - IR paracentesis done on 10/20 with 4 L removed   - 2d echo and stress test pending; as well as several consults and  EGD/C-scope          HCC (hepatocellular carcinoma)    - as seen on CT triple phase; 1 cm focus on inferior hepatic lobe;           Hepatic encephalopathy    - improved with lactulose; add rifaximin           Severe protein-calorie malnutrition    - has hardly had any appetite in weeks; PAB; dietary and ensure          Alcoholic hepatitis with ascites    - planning on IR liver biopsy in the AM; based on results can start prednisone           Elevated AFP    - no clear mass seen on U/S; possible PVT; will get CT abdomen pelvis triple phase for further evaluation           Hyponatremia    - fluid restriction to 1200 cc          Hypomagnesemia    - replace           Hypokalemia    - replace           Leucocytosis    - no current evidence of infection; U/A and CXR negative; SBP negative; blood cultures negative  - likely due to alcoholic hepatitis           Coagulopathy    - started on vitamin K for 3 days           Ascites    - see above           NAFLD (nonalcoholic fatty liver disease)    - see decompensated cirrhosis           Anemia    Of chronic disease, monitor             VTE Risk Mitigation         Ordered     Medium Risk of VTE  Once      10/19/17 1631     Place sequential compression device  Until discontinued      10/19/17 1510              Burton Vora MD  Department of Hospital Medicine   [facility]

## 2017-10-23 NOTE — PROGRESS NOTES
[facility]  Hepatology  Progress Note    Patient Name: Marcie Bazan  MRN: [id number]  Admission Date: 10/19/2017  Hospital Length of Stay: 4 days  Attending Provider: Burton Vora MD   Primary Care Physician: ODILIA Wall  Principal Problem:Decompensated hepatic cirrhosis    Subjective:     Transplant status: Pre-transplant    HPI: This is a 53 y/o female with hx of cirrhosis, previously attributed to PERKINS but suspicion for etoh component as well, complicated by small EV, ascites who presents as admission from Dr. Souza in Hepatology clinic for decompensated cirrhosis.  Noted to have hypokalemia and elevated WBC in clinic thus sent to ER.   Went to clinic and states she has been otherwise in her USOH. She has no complaints this morning.   Pt reports symptoms of fatigue, poor appetite, abdominal distention, abd pain, constipation, problems urinating and generalized weakness.    Has told other providers that she has not had a drink in last 6 months, however tells me that she has not had a drink in at least 1 year.  She states she used to drink daily with a friend, but since that friend has passed away, she has not drank. She states she isnt sure how her drinking caused liver disease because they never drank 'that much'    Records reports EGD in August 2016 that showed small esophageal varices with severe gastritis.    Colonoscopy 2015 with 2 polyps and internal hemorrhoids  In the ER, tap was negative for SBP.    US doppler performed as well:  Decreased Doppler signal in the distal main portal vein and left and right portal veins which may reflect slow flow with no definite thrombus visualized.  Three-phase contrast-enhanced CT may be performed for further evaluation of the portal venous system as warranted.  Findings compatible cirrhosis and portal hypertension, including nodular heterogeneous liver and large volume of ascites.  Status post cholecystectomy.        Interval  History:     No events.  Feels better.    She was informed about positive PETH and adamantly denies drinking recently.      Current Facility-Administered Medications   Medication    acetaminophen tablet 650 mg    cetirizine tablet 10 mg    dextrose 50% injection 12.5 g    dextrose 50% injection 25 g    furosemide tablet 40 mg    glucagon (human recombinant) injection 1 mg    glucose chewable tablet 16 g    glucose chewable tablet 24 g    influenza (FLUZONE,FLUARIX QUADRIVALENT) vaccine 0.5 mL    lactulose 20 gram/30 mL solution Soln 30 g    magnesium sulfate 3 g in dextrose 5 % 250 mL IVPB    omnipaque oral solution 15 mL    ondansetron disintegrating tablet 8 mg    pantoprazole EC tablet 40 mg    potassium chloride SA CR tablet 60 mEq    potassium chloride SA CR tablet 60 mEq    potassium, sodium phosphates 280-160-250 mg packet 1 packet    spironolactone tablet 300 mg       Objective:     Vital Signs (Most Recent):  Temp: 98.5 °F (36.9 °C) (10/23/17 0733)  Pulse: 102 (10/23/17 1037)  Resp: 16 (10/23/17 0733)  BP: (!) 104/59 (10/23/17 0733)  SpO2: 99 % (10/23/17 0733) Vital Signs (24h Range):  Temp:  [98.4 °F (36.9 °C)-98.8 °F (37.1 °C)] 98.5 °F (36.9 °C)  Pulse:  [] 102  Resp:  [15-18] 16  SpO2:  [97 %-99 %] 99 %  BP: (104-139)/(59-74) 104/59     Weight: 71.9 kg (158 lb 6.4 oz) (10/22/17 0400)  Body mass index is 26.36 kg/m².    Physical Exam   Constitutional: She is oriented to person, place, and time. No distress.   HENT:   Head: Normocephalic and atraumatic.   Eyes: Scleral icterus is present.   Cardiovascular: Normal rate and regular rhythm.    Pulmonary/Chest: Breath sounds normal. No respiratory distress.   Abdominal: Soft. She exhibits no distension. There is no tenderness. There is no guarding.   Neurological: She is alert and oriented to person, place, and time.   Skin: Skin is warm and dry. No rash noted.   Psychiatric: She has a normal mood and affect.   Vitals  reviewed.      MELD-Na score: 29 at 10/23/2017  5:24 AM  MELD score: 23 at 10/23/2017  5:24 AM  Calculated from:  Serum Creatinine: 0.8 mg/dL (Rounded to 1) at 10/23/2017  5:24 AM  Serum Sodium: 126 mmol/L at 10/23/2017  5:24 AM  Total Bilirubin: 10.0 mg/dL at 10/23/2017  5:24 AM  INR(ratio): 2.0 at 10/23/2017  5:23 AM  Age: 52 years    Significant Labs:  CBC:   Recent Labs  Lab 10/23/17  0523   WBC 14.54*   RBC 3.96*   HGB 10.6*   HCT 29.8*   *     BMP:   Recent Labs  Lab 10/23/17  0524      *   K 2.8*   CL 89*   CO2 26   BUN 5*   CREATININE 0.8   CALCIUM 8.3*     CMP:   Recent Labs  Lab 10/23/17  0524      CALCIUM 8.3*   ALBUMIN 2.1*   PROT 6.5   *   K 2.8*   CO2 26   CL 89*   BUN 5*   CREATININE 0.8   ALKPHOS 158*   ALT 25   AST 65*   BILITOT 10.0*     Coagulation:   Recent Labs  Lab 10/23/17  0523   INR 2.0*       Significant Imaging:  Labs: Reviewed    Assessment/Plan:     * Decompensated hepatic cirrhosis    Hx of cirrhosis, presumed secondary to PERKINS and Etoh, who presents from clinic with labs concerning for leukocytosis, worsening LFTs and hypokalemia.    Workup for infectious source unrevealing as of now, with SBP negative, normal UA, normal CXR. US doppler fairly unremarkable.  Her history of etoh use varies between providers, and these findings may suggest ongoing Etoh use (leukocytosis, AST > ALT, high Tbili)    CT TP showing indeterminate 1 cm lesions, cannot rule out HCC.  Will need repeat CT TP in 3 months    PETH returned positive. Pt adamantly denies drinking etoh in the past months.    Plan:  Will ask addiction shreya for discuss with patient the positive PETH and denial of etoh  aldactone 300mg daily for diuresis (and given low K)  Continue lactulose and rifixamin  Aggressively replete Mag with K  Strict I/ Os ; 1 L fluid restrict    Pt approved for liver transplant evaluation as inpatient.   - needs dobutamine stress as well as EGD (unsure of need for  colon)  Addiction psych has deemed patient HIGH risk and advises program for rehab.  Likely home in next 24 - 48 hours with correction of K.  Will need outpatient alcohol program and repeat CT TP in 3 months            Thank you for your consult. I will follow-up with patient. Please contact us if you have any additional questions.    Stalin Powers MD  Hepatology  [facility]

## 2017-10-23 NOTE — PROGRESS NOTES
Procedure complete, pt tolerates well.  DSD applied to procedure site, c/d/i.  VSS.  Pt to [facility] for recovery.

## 2017-10-23 NOTE — PROGRESS NOTES
Pt arrives to  via stretcher for transjugular biopsy.  Transferred to table without issue, cardiac monitor, BP cuff, sp02 applied. Consents on chart.

## 2017-10-23 NOTE — CONSULTS
[facility]  Infectious Disease  Consult Note    Patient Name: Marcie Bazan  MRN: [id number]  Admission Date: 10/19/2017  Hospital Length of Stay: 4 days  Attending Physician: Burton Vora MD  Primary Care Provider: ODILIA Wall       Inpatient consult to Infectious Diseases  Consult performed by: SILVANA SCHWARTZ  Consult ordered by: BURTON VORA consult received for Liver transplant evaluation. Chart being reviewed. Full consult note with recommendations to follow.        Thank you,  Silvana Schwartz PA-C

## 2017-10-23 NOTE — PROGRESS NOTES
Liver Transplant SW Consult:    KARLA notified this am by transplant hepatology team that pt has a pos PETH test from last week.   KARLA contacted pts daughter, Reginaldo Bazan ([phone]) to inquire if she was at the hospital for transplant education with nurse coord today.   Reginaldo reports that she is not able to come today, but should be here tomorrow morning, but she wasn't sure what time.  KARLA explained that the inpatient liver nurse coord needs this information to arrange a teaching time with pt and daughter.  KARLA reminded pts dgtr about confirming caregiver plan as well.   SW inquired from pts dgtr if she has any insight into patient's ETOH abuse.  Pts dgtr reports that her mother hid her drinking and liver disease from her until recently when she became very sick.  Pts dgtr reports that she knows that pt did not drink any ETOH in the last 3-4 weeks.   Pts dgtr reports that the pt was drinking in past on a daily to every other day basis.  She was not able to give a good report of pts drinking habits, she did indicate something about a drink in am.  Pts dgtr, then began asking if pts alcohol use would hurt her chances for a liver transplant.  KARLA explained it was concerning that pt was not forthcoming with her alcohol history with  and that she was hiding her liver disease and alcohol use from her family.  KARLA explained that pts insurance also looks at legnth of sobriety and if patient has attempted any substance abuse treatment or not.  KARLA explained that addiction psychiatry will see pt as part of transplant assessment as well.        KARLA again asked pts daughter to call and let us know about caregiver plan and when she will present to the hospital for transplant education with nurse coordinator.   KARLA remains available for all transplant education, resources, and psychosocial support.

## 2017-10-23 NOTE — CONSULTS
"Consult Note  Gynecology    Consult Requested By: Medicine  Reason for Consult: Candidate for Liver transplant    SUBJECTIVE:     History of Present Illness:  Patient is a 52 y.o. with HTN, and cirrhosis currently being evaluated for liver transplant. GYN services requested for pap smear.    Patient is a poor historian. Could be 2/2 to encephalopathy from cirrhosis.    Patient is postmenopausal 2/2 to hysterectomy/BSO/Bilateral salpingectomy roughly 25 years ago , does not see regular GYN for annual exams. When she does she states she goes to "different doctors" [facility]. She has no gynecologic complaints at this time. Denies vaginal bleeding, abnormal discharge, difficulty with urination, incontinence, breast masses, skin changes, or nipple discharge.     OB History:      GYN History:  28 Menopause at age?, LMP over 25 years ago, cycles regular prior to that  Last pap over 25 years ago  States she had an abnormal pap smear. Denies ever having a colposcopy or biospy. Denies MENDOZA II or MENDOZA III. States pap smears after her irregular one were all normal  Is not Sexually active, uses  Denies h/o STDs  Last MMG last year  Denies h/o abnormal MMG. State she has cysts removed form her breasts.    Family History:  No family history of breast, uterine, or ovarian cancer.    PMHx:   Past Medical History:   Diagnosis Date    Hypertension     Liver cirrhosis        PSHx:   Past Surgical History:   Procedure Laterality Date    BREAST SURGERY      CHOLECYSTECTOMY      HYSTERECTOMY         All:   Review of patient's allergies indicates:   Allergen Reactions    Ferrous sulfate Other (See Comments)     Patient states the pill makes her sick. She stated she would rather have a shot       Meds:   Prescriptions Prior to Admission   Medication Sig Dispense Refill Last Dose    cetirizine (ZYRTEC) 10 MG tablet Take 10 mg by mouth once daily.   10/18/2017    lactulose (CHRONULAC) 10 gram/15 mL solution Take 30 " mLs by mouth 2 (two) times daily.  3 10/18/2017    omeprazole (PRILOSEC) 40 MG capsule Take 40 mg by mouth once daily.   10/18/2017    ondansetron (ZOFRAN-ODT) 4 MG TbDL Take 1 tablet (4 mg total) by mouth every 8 (eight) hours as needed (nausea and vomiting). 12 tablet 0 Past Month    spironolactone (ALDACTONE) 100 MG tablet Take 100 mg by mouth once daily.       lisinopril (PRINIVIL,ZESTRIL) 5 MG tablet Take 1 tablet by mouth only as needed for high blood pressure   Unknown    potassium chloride SA (K-DUR,KLOR-CON) 20 MEQ tablet Take 1 mEq by mouth 2 (two) times daily.   Taking       SH:   Social History     Social History    Marital status: Single     Spouse name: N/A    Number of children: N/A    Years of education: N/A     Occupational History    Not on file.     Social History Main Topics    Smoking status: Never Smoker    Smokeless tobacco: Never Used    Alcohol use Yes      Comment: occasionally    Drug use: No    Sexual activity: Not Currently     Other Topics Concern    Not on file     Social History Narrative    No narrative on file       FH:   Family History   Problem Relation Age of Onset    Hypertension Mother     Hypertension Father     Diabetes Father     Diabetes Sister     Depression Maternal Grandfather        Review of Systems:  Constitutional: no fever or chills  Respiratory: no cough or shortness of breath  Cardiovascular: no chest pain or palpitations  Gastrointestinal:diarrhea 2/2 to lactulose  Genitourinary: no hematuria or dysuria, negative for urinary incontinence  Integument/Breast: no rash or pruritis, negative for breast lump, nipple discharge and skin lesion(s)  Hematologic/Lymphatic: no easy bruising or lymphadenopathy     OBJECTIVE:     Temp:  [98.2 °F (36.8 °C)-98.8 °F (37.1 °C)] 98.2 °F (36.8 °C)  Pulse:  [] 109  Resp:  [16-18] 18  SpO2:  [97 %-99 %] 98 %  BP: (104-131)/(55-74) 116/55    Physical Exam:  GEN: Thin, frail woman. Large ascetic belly. Scleral  icterus present bilaterally. Sallow appearing.   NECK: No thyroid tenderness, no palpable masses or nodules.  CV: Regular rate and rhythm. Normal sounding S1 and S2. No murmur, rub, or gallop noted.  LUNGS: Clear to auscultation bilaterally. No wheezes, rales or rhonchi noted.  BREASTS: No tenderness or palpable masses. No skin changes or nipple discharge. No axillary lymphadenopathy.  ABDOMEN: Distended. Large ascitic belly. Fluid wave present. Good bowel sounds. No guarding or rebound tenderness.  EXT: No erythema, no edema  EXT. GENITALIA: appear normal, no lesions noted  VAGINA: Pink, moist, and well-rugated. No blood or discharge present, no lesions noted. Vaginal cuff intact, well healed. No lesions seen.   CERVIX: Absent.   UTERUS: Absent   ADNEXA: Absent    ASSESSMENT/PLAN:     Patient Active Problem List   Diagnosis    Allergic rhinitis    Anemia    Decompensated hepatic cirrhosis    Erosive esophagitis    Essential hypertension    Flatulence/gas pain/belching    Hemoptysis    History of abnormal cervical Pap smear    History of Helicobacter pylori infection    Lipoma    Low back pain    Multinodular thyroid    NAFLD (nonalcoholic fatty liver disease)    Obesity (BMI 30-39.9)    Seasonal allergic rhinitis    Jaundice    Ascites    Coagulopathy    Leucocytosis    Hypokalemia    Hypomagnesemia    Hyponatremia    Serum ammonia increased    Elevated AFP    Elevated lactic acid level    Alcoholic hepatitis with ascites    Moderate protein-calorie malnutrition    Severe protein-calorie malnutrition    Hepatic encephalopathy    HCC (hepatocellular carcinoma)       Gynecologic malignancy screening. Moderate risk of malignancy at this time.  -Pap not collected 2/2 to patient not having a cervix 2/2 to s/p hysterectomy. Patient denies having MENDOZA II or MENDOZA II so pap of vaginal cuff is not indicated at this time.   - Patient is a poor historian. Could potentially request old records to  verify patient's history.     Thank you for the consult. Please call GYN with any questions.  Will sign off.

## 2017-10-23 NOTE — H&P
Inpatient Radiology Pre-procedure Note    History of Present Illness:  Marcie Bazan is a 52 y.o. female who presents for transjugular liver biopsy.  Admission H&P reviewed.  Past Medical History:   Diagnosis Date    Hypertension     Liver cirrhosis      Past Surgical History:   Procedure Laterality Date    BREAST SURGERY      CHOLECYSTECTOMY      HYSTERECTOMY         Review of Systems:   As documented in primary team H&P    Home Meds:   Prior to Admission medications    Medication Sig Start Date End Date Taking? Authorizing Provider   cetirizine (ZYRTEC) 10 MG tablet Take 10 mg by mouth once daily. 2/9/17 2/9/18 Yes Historical Provider, MD   lactulose (CHRONULAC) 10 gram/15 mL solution Take 30 mLs by mouth 2 (two) times daily. 10/8/17  Yes Historical Provider, MD   omeprazole (PRILOSEC) 40 MG capsule Take 40 mg by mouth once daily. 3/8/17  Yes Historical Provider, MD   ondansetron (ZOFRAN-ODT) 4 MG TbDL Take 1 tablet (4 mg total) by mouth every 8 (eight) hours as needed (nausea and vomiting). 9/15/17  Yes Bisi Jonas, NP   spironolactone (ALDACTONE) 100 MG tablet Take 100 mg by mouth once daily.   Yes Historical Provider, MD   lisinopril (PRINIVIL,ZESTRIL) 5 MG tablet Take 1 tablet by mouth only as needed for high blood pressure 6/8/17   Historical Provider, MD   potassium chloride SA (K-DUR,KLOR-CON) 20 MEQ tablet Take 1 mEq by mouth 2 (two) times daily. 10/12/17   Historical Provider, MD     Scheduled Meds:    cetirizine  10 mg Oral Daily    furosemide  40 mg Oral Daily    lactulose  30 g Oral QID    magnesium sulfate IVPB  3 g Intravenous Once    pantoprazole  40 mg Oral Daily    potassium bicarbonate  50 mEq Oral Once    potassium bicarbonate  50 mEq Oral Once    potassium bicarbonate  50 mEq Oral Once    potassium, sodium phosphates  1 packet Oral QID (AC & HS)    spironolactone  300 mg Oral Daily     Continuous Infusions:    PRN Meds:acetaminophen, dextrose 50%, dextrose 50%, glucagon  (human recombinant), glucose, glucose, influenza, omnipaque, ondansetron  Anticoagulants/Antiplatelets: no anticoagulation    Allergies:   Review of patient's allergies indicates:   Allergen Reactions    Ferrous sulfate Other (See Comments)     Patient states the pill makes her sick. She stated she would rather have a shot     Sedation Hx: have not been any systemic reactions    Labs:    Recent Labs  Lab 10/23/17  0523   INR 2.0*       Recent Labs  Lab 10/23/17  0523   WBC 14.54*   HGB 10.6*   HCT 29.8*   MCV 75*   *      Recent Labs  Lab 10/20/17  1352  10/23/17  0524   GLU  --   < > 105   NA  --   < > 126*   K  --   < > 2.8*   CL  --   < > 89*   CO2  --   < > 26   BUN  --   < > 5*   CREATININE  --   < > 0.8   CALCIUM  --   < > 8.3*   MG  --   < > 1.2*   ALT  --   < > 25   AST  --   < > 65*   ALBUMIN  --   < > 2.1*   BILITOT  --   < > 10.0*   BILIDIR 7.9*  --   --    < > = values in this interval not displayed.      Vitals:  Temp: 98.2 °F (36.8 °C) (10/23/17 1253)  Pulse: 101 (10/23/17 1521)  Resp: 18 (10/23/17 1253)  BP: (!) 116/55 (10/23/17 1253)  SpO2: 98 % (10/23/17 1253)     Physical Exam:  ASA: 3  Mallampati: 2    General: no acute distress  Mental Status: alert and oriented to person, place and time  HEENT: normocephalic, atraumatic  Chest: unlabored breathing  Heart: regular heart rate  Abdomen: nondistended  Extremity: moves all extremities    Plan: patient will undergo a transjugular liver biopsy   Sedation Plan: shanelle Angeles MD  Department of Radiology   PGY II  [phone]

## 2017-10-23 NOTE — PT/OT/SLP PROGRESS
Physical Therapy  Treatment    Marcie Bazan   MRN: [id number]   Admitting Diagnosis: Decompensated hepatic cirrhosis    PT Received On: 10/23/17  PT Start Time: 1000     PT Stop Time: 1010    PT Total Time (min): 10 min       Billable Minutes:  Gait Training 10    Treatment Type: Treatment  PT/PTA: PT             General Precautions: Standard, fall  Orthopedic Precautions: N/A   Braces: N/A    Do you have any cultural, spiritual, Restoration conflicts, given your current situation?: none stated    Subjective:  Communicated with RN prior to session.  Patient agreeable to PT session. Found seated on sofa upon room entry. No complaints of pain.    Pain/Comfort  Pain Rating 1: 0/10  Pain Rating Post-Intervention 1: 0/10    Objective:   Patient found with:  (none)    Functional Mobility:  Bed Mobility:   Rolling/Turning to Left: Modified independent  Scooting/Bridging: Modified Independent  Supine to Sit:  (not observed; patient seated on sofa upon room entry)  Sit to Supine: Modified Independent    Transfers:  Sit <> Stand Assistance: Supervision  Sit <> Stand Assistive Device: No Assistive Device  Bed <> Chair Technique: Stand Pivot  Bed <> Chair Assistance: Supervision  Bed <> Chair Assistive Device: No Assistive Device    Gait:   Gait Distance: [id number]  Assistance 1: Supervision  Gait Assistive Device: No device  Gait Pattern: swing-through gait  Gait Deviation(s): decreased lisbet, increased time in double stance, decreased stride length, decreased step length, foot flat    Balance:   Static Sit: NORMAL: No deviations seen in posture held statically  Dynamic Sit: NORMAL: No deviations seen in posture held dynamically  Static Stand: FAIR: Maintains without assist but unable to take challenges  Dynamic stand: GOOD-: Needs SUPERVISION only during gait and able to self right with moderate      Therapeutic Activities and Exercises:  Patient educated on role of PT/POC.    Sit<>stand from sofa with supervision.  Gait  [id number] with supervision. No device or LOB noted.    Significantly improved dynamic balance during ambulation training this session.    Returned to bed after ambulation with supervision/Mod I for labored movement.    Educated to stay out of bed majority of the day to prevent deconditioning. Verbalized understanding.    Safe to ambulate within room without assistance.     AM-PAC 6 CLICK MOBILITY  How much help from another person does this patient currently need?   1 = Unable, Total/Dependent Assistance  2 = A lot, Maximum/Moderate Assistance  3 = A little, Minimum/Contact Guard/Supervision  4 = None, Modified [place]/Independent    Turning over in bed (including adjusting bedclothes, sheets and blankets)?: 4  Sitting down on and standing up from a chair with arms (e.g., wheelchair, bedside commode, etc.): 4  Moving from lying on back to sitting on the side of the bed?: 4  Moving to and from a bed to a chair (including a wheelchair)?: 4  Need to walk in hospital room?: 3  Climbing 3-5 steps with a railing?: 3  Total Score: 22    AM-PAC Raw Score CMS G-Code Modifier Level of Impairment Assistance   6 % Total / Unable   7 - 9 CM 80 - 100% Maximal Assist   10 - 14 CL 60 - 80% Moderate Assist   15 - 19 CK 40 - 60% Moderate Assist   20 - 22 CJ 20 - 40% Minimal Assist   23 CI 1-20% SBA / CGA   24 CH 0% Independent/ Mod I     Patient left supine with all lines intact, call button in reach and RN notified.    Assessment:  Marcie Bazan is a 52 y.o. female with a medical diagnosis of Decompensated hepatic cirrhosis and presents with rehab identified impairments listed below. Improved tolerance to ambulation training walking [id number] with supervision without device or LOB. Bed mobility and transfers with supervision. DME recs pending considering significant functional improvement since initial evaluation. To benefit from continued skilled intervention to address deficits prior to transition home with  HHPT.    Rehab identified problem list/impairments: Rehab identified problem list/impairments: impaired endurance, impaired functional mobilty, impaired balance, decreased lower extremity function, decreased safety awareness    Rehab potential is good.    Activity tolerance: Good    Discharge recommendations: Discharge Facility/Level Of Care Needs: home with home health     Barriers to discharge: Barriers to Discharge: None    Equipment recommendations: Equipment Needed After Discharge:  (TBD; pending patient progress)     GOALS:    Physical Therapy Goals        Problem: Physical Therapy Goal    Goal Priority Disciplines Outcome Goal Variances Interventions   Physical Therapy Goal     PT/OT, PT Ongoing (interventions implemented as appropriate)     Description:  Goals to be met by: 10/30/2017    Patient will increase functional independence with mobility by performin. Supine to sit with Modified [place]  2. Sit to supine with Modified [place]  3. Sit to stand transfer with Supervision - Met  4. Bed to chair transfer with Supervision using appropriate Ad. - Met  5. Gait  x 100 feet with Minimal Assistance using appropriate AD - Met   Updated: gait x 125 ft Mod I without device.                       PLAN:    Patient to be seen 3 x/week  to address the above listed problems via gait training, therapeutic activities, therapeutic exercises, neuromuscular re-education  Plan of Care expires: 17  Plan of Care reviewed with: patient         Filemon Feliciano III, PT  10/23/2017

## 2017-10-24 ENCOUNTER — DOCUMENTATION ONLY (OUTPATIENT)
Dept: TRANSPLANT | Facility: CLINIC | Age: 52
End: 2017-10-24

## 2017-10-24 LAB
ALBUMIN SERPL BCP-MCNC: 2.2 G/DL
ALP SERPL-CCNC: 160 U/L
ALT SERPL W/O P-5'-P-CCNC: 27 U/L
ANION GAP SERPL CALC-SCNC: 12 MMOL/L
ANISOCYTOSIS BLD QL SMEAR: ABNORMAL
AST SERPL-CCNC: 66 U/L
BACTERIA BLD CULT: NORMAL
BACTERIA BLD CULT: NORMAL
BACTERIA FLD CULT: NORMAL
BASOPHILS # BLD AUTO: 0.07 K/UL
BASOPHILS NFR BLD: 0.4 %
BILIRUB SERPL-MCNC: 11.3 MG/DL
BUN SERPL-MCNC: 6 MG/DL
CALCIUM SERPL-MCNC: 8.8 MG/DL
CHLORIDE SERPL-SCNC: 89 MMOL/L
CMV IGG SERPL QL IA: REACTIVE
CO2 SERPL-SCNC: 25 MMOL/L
CREAT SERPL-MCNC: 0.8 MG/DL
DIASTOLIC DYSFUNCTION: NO
DIFFERENTIAL METHOD: ABNORMAL
EBV VCA IGG SER QL IA: POSITIVE
EOSINOPHIL # BLD AUTO: 0.2 K/UL
EOSINOPHIL NFR BLD: 1.3 %
ERYTHROCYTE [DISTWIDTH] IN BLOOD BY AUTOMATED COUNT: 25.3 %
EST. GFR  (AFRICAN AMERICAN): >60 ML/MIN/1.73 M^2
EST. GFR  (NON AFRICAN AMERICAN): >60 ML/MIN/1.73 M^2
ESTIMATED PA SYSTOLIC PRESSURE: 21.32
GLUCOSE SERPL-MCNC: 94 MG/DL
HCT VFR BLD AUTO: 31.9 %
HGB BLD-MCNC: 11.4 G/DL
HYPOCHROMIA BLD QL SMEAR: ABNORMAL
IMM GRANULOCYTES # BLD AUTO: 0.12 K/UL
IMM GRANULOCYTES NFR BLD AUTO: 0.7 %
INR PPP: 1.9
LYMPHOCYTES # BLD AUTO: 2.1 K/UL
LYMPHOCYTES NFR BLD: 11.6 %
MAGNESIUM SERPL-MCNC: 1.6 MG/DL
MCH RBC QN AUTO: 27.4 PG
MCHC RBC AUTO-ENTMCNC: 35.7 G/DL
MCV RBC AUTO: 77 FL
MITOGEN NIL: 5.23 IU/ML
MONOCYTES # BLD AUTO: 1.9 K/UL
MONOCYTES NFR BLD: 10.6 %
NEUTROPHILS # BLD AUTO: 13.5 K/UL
NEUTROPHILS NFR BLD: 75.4 %
NIL: 0.02 IU/ML
NRBC BLD-RTO: 0 /100 WBC
PHOSPHATE SERPL-MCNC: 2.4 MG/DL
PLATELET # BLD AUTO: 105 K/UL
PLATELET BLD QL SMEAR: ABNORMAL
PMV BLD AUTO: 9.8 FL
POIKILOCYTOSIS BLD QL SMEAR: ABNORMAL
POTASSIUM SERPL-SCNC: 4.1 MMOL/L
PROT SERPL-MCNC: 6.7 G/DL
PROTHROMBIN TIME: 18.9 SEC
RBC # BLD AUTO: 4.16 M/UL
RETIRED EF AND QEF - SEE NOTES: 73 (ref 55–65)
SMOOTH MUSCLE AB TITR SER IF: NORMAL {TITER}
SODIUM SERPL-SCNC: 126 MMOL/L
TARGETS BLD QL SMEAR: ABNORMAL
TB ANTIGEN NIL: -0 IU/ML
TB ANTIGEN: 0.02 IU/ML
TB GOLD: NEGATIVE
TRICUSPID VALVE REGURGITATION: NORMAL
WBC # BLD AUTO: 17.97 K/UL

## 2017-10-24 PROCEDURE — 83735 ASSAY OF MAGNESIUM: CPT | Mod: NTX

## 2017-10-24 PROCEDURE — 99233 SBSQ HOSP IP/OBS HIGH 50: CPT | Mod: NTX,,, | Performed by: HOSPITALIST

## 2017-10-24 PROCEDURE — 93351 STRESS TTE COMPLETE: CPT | Mod: 26,NTX,, | Performed by: INTERNAL MEDICINE

## 2017-10-24 PROCEDURE — 88307 TISSUE EXAM BY PATHOLOGIST: CPT | Mod: 26,,, | Performed by: PATHOLOGY

## 2017-10-24 PROCEDURE — 85025 COMPLETE CBC W/AUTO DIFF WBC: CPT | Mod: NTX

## 2017-10-24 PROCEDURE — 97803 MED NUTRITION INDIV SUBSEQ: CPT | Mod: NTX

## 2017-10-24 PROCEDURE — 88307 TISSUE EXAM BY PATHOLOGIST: CPT | Performed by: PATHOLOGY

## 2017-10-24 PROCEDURE — 63600175 PHARM REV CODE 636 W HCPCS: Mod: NTX | Performed by: RADIOLOGY

## 2017-10-24 PROCEDURE — 25000003 PHARM REV CODE 250: Mod: NTX | Performed by: INTERNAL MEDICINE

## 2017-10-24 PROCEDURE — 93320 DOPPLER ECHO COMPLETE: CPT | Mod: 26,NTX,, | Performed by: INTERNAL MEDICINE

## 2017-10-24 PROCEDURE — 88313 SPECIAL STAINS GROUP 2: CPT | Mod: 26,,, | Performed by: PATHOLOGY

## 2017-10-24 PROCEDURE — 93325 DOPPLER ECHO COLOR FLOW MAPG: CPT | Mod: 26,NTX,, | Performed by: INTERNAL MEDICINE

## 2017-10-24 PROCEDURE — 93320 DOPPLER ECHO COMPLETE: CPT | Mod: NTX

## 2017-10-24 PROCEDURE — 0W9G3ZZ DRAINAGE OF PERITONEAL CAVITY, PERCUTANEOUS APPROACH: ICD-10-PCS | Performed by: RADIOLOGY

## 2017-10-24 PROCEDURE — 85610 PROTHROMBIN TIME: CPT | Mod: NTX

## 2017-10-24 PROCEDURE — 20600001 HC STEP DOWN PRIVATE ROOM: Mod: NTX

## 2017-10-24 PROCEDURE — 99499 UNLISTED E&M SERVICE: CPT | Mod: NTX,,, | Performed by: PHYSICIAN ASSISTANT

## 2017-10-24 PROCEDURE — 63600175 PHARM REV CODE 636 W HCPCS: Mod: NTX | Performed by: HOSPITALIST

## 2017-10-24 PROCEDURE — 99233 SBSQ HOSP IP/OBS HIGH 50: CPT | Mod: NTX,,, | Performed by: INTERNAL MEDICINE

## 2017-10-24 PROCEDURE — 80053 COMPREHEN METABOLIC PANEL: CPT | Mod: NTX

## 2017-10-24 PROCEDURE — P9047 ALBUMIN (HUMAN), 25%, 50ML: HCPCS | Mod: NTX | Performed by: HOSPITALIST

## 2017-10-24 PROCEDURE — 25000003 PHARM REV CODE 250: Mod: NTX | Performed by: HOSPITALIST

## 2017-10-24 PROCEDURE — 0FB23ZX EXCISION OF LEFT LOBE LIVER, PERCUTANEOUS APPROACH, DIAGNOSTIC: ICD-10-PCS | Performed by: RADIOLOGY

## 2017-10-24 PROCEDURE — 84100 ASSAY OF PHOSPHORUS: CPT | Mod: NTX

## 2017-10-24 RX ORDER — ALBUMIN HUMAN 250 G/1000ML
25 SOLUTION INTRAVENOUS 3 TIMES DAILY
Status: COMPLETED | OUTPATIENT
Start: 2017-10-24 | End: 2017-10-25

## 2017-10-24 RX ORDER — MIDAZOLAM HYDROCHLORIDE 1 MG/ML
INJECTION INTRAMUSCULAR; INTRAVENOUS CODE/TRAUMA/SEDATION MEDICATION
Status: COMPLETED | OUTPATIENT
Start: 2017-10-24 | End: 2017-10-24

## 2017-10-24 RX ORDER — FENTANYL CITRATE 50 UG/ML
INJECTION, SOLUTION INTRAMUSCULAR; INTRAVENOUS CODE/TRAUMA/SEDATION MEDICATION
Status: COMPLETED | OUTPATIENT
Start: 2017-10-24 | End: 2017-10-24

## 2017-10-24 RX ORDER — PREDNISONE 20 MG/1
40 TABLET ORAL DAILY
Status: DISCONTINUED | OUTPATIENT
Start: 2017-10-24 | End: 2017-10-25 | Stop reason: HOSPADM

## 2017-10-24 RX ADMIN — FUROSEMIDE 40 MG: 40 TABLET ORAL at 09:10

## 2017-10-24 RX ADMIN — POTASSIUM & SODIUM PHOSPHATES POWDER PACK 280-160-250 MG 1 PACKET: 280-160-250 PACK at 09:10

## 2017-10-24 RX ADMIN — ALBUMIN (HUMAN) 25 G: 12.5 SOLUTION INTRAVENOUS at 06:10

## 2017-10-24 RX ADMIN — LACTULOSE 30 G: 20 SOLUTION ORAL at 10:10

## 2017-10-24 RX ADMIN — MIDAZOLAM HYDROCHLORIDE 1 MG: 1 INJECTION, SOLUTION INTRAMUSCULAR; INTRAVENOUS at 04:10

## 2017-10-24 RX ADMIN — FENTANYL CITRATE 50 MCG: 50 INJECTION, SOLUTION INTRAMUSCULAR; INTRAVENOUS at 04:10

## 2017-10-24 RX ADMIN — LACTULOSE 30 G: 20 SOLUTION ORAL at 06:10

## 2017-10-24 RX ADMIN — SPIRONOLACTONE 300 MG: 100 TABLET ORAL at 09:10

## 2017-10-24 RX ADMIN — POTASSIUM & SODIUM PHOSPHATES POWDER PACK 280-160-250 MG 1 PACKET: 280-160-250 PACK at 06:10

## 2017-10-24 RX ADMIN — PANTOPRAZOLE SODIUM 40 MG: 40 TABLET, DELAYED RELEASE ORAL at 09:10

## 2017-10-24 RX ADMIN — ALBUMIN (HUMAN) 25 G: 12.5 SOLUTION INTRAVENOUS at 09:10

## 2017-10-24 RX ADMIN — CETIRIZINE HYDROCHLORIDE 10 MG: 10 TABLET, FILM COATED ORAL at 09:10

## 2017-10-24 RX ADMIN — LACTULOSE 30 G: 20 SOLUTION ORAL at 11:10

## 2017-10-24 RX ADMIN — POTASSIUM & SODIUM PHOSPHATES POWDER PACK 280-160-250 MG 1 PACKET: 280-160-250 PACK at 10:10

## 2017-10-24 RX ADMIN — PREDNISONE 40 MG: 20 TABLET ORAL at 10:10

## 2017-10-24 NOTE — PROCEDURES
Radiology Post-Procedure Note    Pre Op Diagnosis: Ascites    Post Op Diagnosis: Same    Procedure: US guided paracentesis.    Procedure performed by: Adrian Callahan MD    Written Informed Consent Obtained: Yes  Specimen Removed: YES 3.5 L clear yellow fluid  Estimated Blood Loss: Minimal    Findings:   Successful RLQ paracentesis.  Albumin administered per protocol. No complications.    Patient tolerated procedure well.    Adrian Callahan M.D.  Diagnostic and Interventional Radiologist  Department of Radiology  Pager: [phone]

## 2017-10-24 NOTE — PROGRESS NOTES
[facility]  Hepatology  Progress Note    Patient Name: Marcie Bazan  MRN: [id number]  Admission Date: 10/19/2017  Hospital Length of Stay: 5 days  Attending Provider: Bruton Vora MD   Primary Care Physician: ODILIA Wall  Principal Problem:Decompensated hepatic cirrhosis    Subjective:     Transplant status: No    HPI: This is a 53 y/o female with hx of cirrhosis, previously attributed to PERKINS but suspicion for etoh component as well, complicated by small EV, ascites who presents as admission from Dr. Souza in Hepatology clinic for decompensated cirrhosis.  Noted to have hypokalemia and elevated WBC in clinic thus sent to ER.   Went to clinic and states she has been otherwise in her USOH. She has no complaints this morning.   Pt reports symptoms of fatigue, poor appetite, abdominal distention, abd pain, constipation, problems urinating and generalized weakness.    Has told other providers that she has not had a drink in last 6 months, however tells me that she has not had a drink in at least 1 year.  She states she used to drink daily with a friend, but since that friend has passed away, she has not drank. She states she isnt sure how her drinking caused liver disease because they never drank 'that much'    Records reports EGD in August 2016 that showed small esophageal varices with severe gastritis.    Colonoscopy 2015 with 2 polyps and internal hemorrhoids  In the ER, tap was negative for SBP.    US doppler performed as well:  Decreased Doppler signal in the distal main portal vein and left and right portal veins which may reflect slow flow with no definite thrombus visualized.  Three-phase contrast-enhanced CT may be performed for further evaluation of the portal venous system as warranted.  Findings compatible cirrhosis and portal hypertension, including nodular heterogeneous liver and large volume of ascites.  Status post cholecystectomy.        Interval History:     Could  not perform IR TJ biopsy yesterday.  Will reattempt today    Feels better.      Current Facility-Administered Medications   Medication    acetaminophen tablet 650 mg    albumin human 25% bottle 25 g    cetirizine tablet 10 mg    dextrose 50% injection 12.5 g    dextrose 50% injection 25 g    glucagon (human recombinant) injection 1 mg    glucose chewable tablet 16 g    glucose chewable tablet 24 g    influenza (FLUZONE,FLUARIX QUADRIVALENT) vaccine 0.5 mL    lactulose 20 gram/30 mL solution Soln 30 g    omnipaque oral solution 15 mL    ondansetron disintegrating tablet 8 mg    pantoprazole EC tablet 40 mg    potassium, sodium phosphates 280-160-250 mg packet 1 packet    predniSONE tablet 40 mg    spironolactone tablet 300 mg       Objective:     Vital Signs (Most Recent):  Temp: 98.6 °F (37 °C) (10/24/17 1144)  Pulse: 108 (10/24/17 1144)  Resp: 16 (10/24/17 1144)  BP: 119/65 (10/24/17 1144)  SpO2: 99 % (10/24/17 1144) Vital Signs (24h Range):  Temp:  [97.6 °F (36.4 °C)-98.6 °F (37 °C)] 98.6 °F (37 °C)  Pulse:  [] 108  Resp:  [14-20] 16  SpO2:  [95 %-100 %] 99 %  BP: (113-143)/(52-76) 119/65     Weight: 71.3 kg (157 lb 3 oz) (10/24/17 1000)  Body mass index is 26.16 kg/m².    Physical Exam   Constitutional: She is oriented to person, place, and time. No distress.   HENT:   Head: Normocephalic and atraumatic.   Eyes: Scleral icterus is present.   Cardiovascular: Normal rate and regular rhythm.    Pulmonary/Chest: Breath sounds normal. No respiratory distress.   Abdominal: Soft. She exhibits distension. There is no tenderness. There is no guarding.   Neurological: She is alert and oriented to person, place, and time.   Skin: Skin is warm and dry. No rash noted.   Psychiatric: She has a normal mood and affect.   Vitals reviewed.      MELD-Na score: 29 at 10/24/2017  4:28 AM  MELD score: 23 at 10/24/2017  4:28 AM  Calculated from:  Serum Creatinine: 0.8 mg/dL (Rounded to 1) at 10/24/2017  4:27  AM  Serum Sodium: 126 mmol/L at 10/24/2017  4:27 AM  Total Bilirubin: 11.3 mg/dL at 10/24/2017  4:27 AM  INR(ratio): 1.9 at 10/24/2017  4:28 AM  Age: 52 years    Significant Labs:  CBC:   Recent Labs  Lab 10/24/17  0428   WBC 17.97*   RBC 4.16   HGB 11.4*   HCT 31.9*   *     BMP:   Recent Labs  Lab 10/24/17  0427   GLU 94   *   K 4.1   CL 89*   CO2 25   BUN 6   CREATININE 0.8   CALCIUM 8.8     CMP:   Recent Labs  Lab 10/24/17  0427   GLU 94   CALCIUM 8.8   ALBUMIN 2.2*   PROT 6.7   *   K 4.1   CO2 25   CL 89*   BUN 6   CREATININE 0.8   ALKPHOS 160*   ALT 27   AST 66*   BILITOT 11.3*     Coagulation:   Recent Labs  Lab 10/24/17  0428   INR 1.9*       Significant Imaging:  Labs: Reviewed     IR pressures reviewed: portal gradient is 46    Assessment/Plan:     * Decompensated hepatic cirrhosis    Hx of cirrhosis, presumed secondary to PERKINS and Etoh, who presents from clinic with labs concerning for leukocytosis, worsening LFTs and hypokalemia.    Workup for infectious source unrevealing as of now, with SBP negative, normal UA, normal CXR. US doppler fairly unremarkable.  Her history of etoh use varies between providers, and these findings may suggest ongoing Etoh use (leukocytosis, AST > ALT, high Tbili)    CT TP showing indeterminate 1 cm lesions, cannot rule out HCC.  PETH returned positive. Pt adamantly denies drinking etoh in the past months.  DF is 49 (10/24)      Plan:  Given concern for Etoh hepatitis, will start prednisone 40mg daily today  IR for TJ liver biopsy today   - we feel percutaneous biopsy is too high risk given INR  Paracentesis prior to liver biopsy  Will ask addiction pysch for discuss with patient the positive PETH and denial of etoh  aldactone 300mg daily for diuresis (and given low K)  Continue lactulose and rifixamin  Aggressively replete Mag with K  Strict I/ Os ; 1 L fluid restrict    Pt approved for liver transplant evaluation as inpatient.  Addiction psych has deemed  patient HIGH risk and advises program for rehab.  Will need outpatient alcohol program and repeat CT TP in 3 months            Thank you for your consult. I will follow-up with patient. Please contact us if you have any additional questions.    Stalin Powers MD  Hepatology  [facility]

## 2017-10-24 NOTE — PROGRESS NOTES
"  [facility]  Adult Nutrition  Consult Note    SUMMARY     Recommendations    1. When medically feasible, resume 2 gram sodium diet (fluid restriction per MD) + Boost Plus ONS.   2. RD to monitor & follow-up.    Goals: PO intake >50%  Nutrition Goal Status: progressing towards goal  Communication of RD Recs: reviewed with RN    Reason for Assessment    Reason for Assessment: RD follow-up  Diagnosis: other (see comments) (Cirrhosis)  Relevent Medical History: HTN   Interdisciplinary Rounds: did not attend     General Information Comments: Pt currently NPO for stress test & paracentesis. Prior to NPO status, pt with fair appetite, consuming 50% of meals.  Nutrition Discharge Planning: Adequate PO intake    Nutrition Prescription Ordered    Current Diet Order: NPO     Nutrition Order Comments: Was on 2 gram sodium w/ 50% PO intake  Oral Nutrition Supplement: Boost Plus ONS     Nutrition/Diet History    Patient Reported Diet/Restrictions/Preferences: general, low salt     Factors Affecting Nutritional Intake: NPO    Labs/Tests/Procedures/Meds    Pertinent Labs Reviewed: reviewed, pertinent  Pertinent Labs Comments: Na 126, Bili 11.3  Pertinent Medications Reviewed: reviewed, pertinent    Physical Findings    Overall Physical Appearance: edematous  Oral/Mouth Cavity: WDL  Skin: intact    Anthropometrics    Height: 5' 5" (165.1 cm)  Weight Method: Standard Scale  Weight: 71.3 kg (157 lb 3 oz)     Ideal Body Weight (IBW), Female: 125 lb  % Ideal Body Weight, Female (lb): 125.75 lb     BMI (Calculated): 26.2  BMI Grade: 25 - 29.9 - overweight  Weight Loss: other (see comments) (Pt unsure of UBW 2/2 fluid status.)    Estimated/Assessed Needs    Weight Used For Calorie Calculations: 71.3 kg (157 lb 3 oz)      Energy Calorie Requirements (kcal): 1676 kcal/d  Energy Need Method: Thomas-St Jeor (1.25 PAL)     Weight Used For Protein Calculations: 71.3 kg (157 lb 3 oz)  Protein Requirements: 71-86 g/d   "   Fluid Need Method: RDA Method, other (see comments) (Per MD or 1 mL/kcal)    Assessment and Plan    Inadequate energy intake r/t decreased appetite AEB poor PO intake PTA.  Status: Improving    Monitor and Evaluation    Food and Nutrient Intake: energy intake, food and beverage intake  Food and Nutrient Adminstration: diet order     Physical Activity and Function: nutrition-related ADLs and IADLs  Anthropometric Measurements: weight, weight change, body mass index  Biochemical Data, Medical Tests and Procedures: lipid profile, inflammatory profile, gastrointestinal profile, electrolyte and renal panel, glucose/endocrine profile  Nutrition-Focused Physical Findings: overall appearance    Nutrition Risk    Level of Risk: other (see comments) (2x/week)    Nutrition Follow-Up    RD Follow-up?: Yes

## 2017-10-24 NOTE — PLAN OF CARE
Problem: Fall Risk (Adult)  Intervention: Monitor/Assist with Self Care  Pt remains free of falls. Up ad enid.       Problem: Patient Care Overview  Goal: Plan of Care Review  Outcome: Ongoing (interventions implemented as appropriate)  Pt VSS. Pt had dobutamine stress test today. Pt went IR today as well for liver biopsy and Paracentesis with a removal of 3.5L. Pt planned for EGD tomorrow. Discharge post EGD. Will continue to monitor.     Problem: Liver Failure, Acute/Chronic (Adult)  Intervention: Monitor/Manage Fluid and Electrolyte Balance  Pt fluids restricted to 800 ml per day. Pt made aware.

## 2017-10-24 NOTE — H&P
Inpatient Radiology Pre-procedure Note    History of Present Illness:  Marcie Bazan is a 52 y.o. female who presents for paracentesis and liver biopsy.  Admission H&P reviewed.  Past Medical History:   Diagnosis Date    Hypertension     Liver cirrhosis      Past Surgical History:   Procedure Laterality Date    BREAST SURGERY      CHOLECYSTECTOMY      HYSTERECTOMY         Review of Systems:   As documented in primary team H&P    Home Meds:   Prior to Admission medications    Medication Sig Start Date End Date Taking? Authorizing Provider   cetirizine (ZYRTEC) 10 MG tablet Take 10 mg by mouth once daily. 2/9/17 2/9/18 Yes Historical Provider, MD   lactulose (CHRONULAC) 10 gram/15 mL solution Take 30 mLs by mouth 2 (two) times daily. 10/8/17  Yes Historical Provider, MD   omeprazole (PRILOSEC) 40 MG capsule Take 40 mg by mouth once daily. 3/8/17  Yes Historical Provider, MD   ondansetron (ZOFRAN-ODT) 4 MG TbDL Take 1 tablet (4 mg total) by mouth every 8 (eight) hours as needed (nausea and vomiting). 9/15/17  Yes Bisi Jonas, NP   spironolactone (ALDACTONE) 100 MG tablet Take 100 mg by mouth once daily.   Yes Historical Provider, MD   lisinopril (PRINIVIL,ZESTRIL) 5 MG tablet Take 1 tablet by mouth only as needed for high blood pressure 6/8/17   Historical Provider, MD   potassium chloride SA (K-DUR,KLOR-CON) 20 MEQ tablet Take 1 mEq by mouth 2 (two) times daily. 10/12/17   Historical Provider, MD     Scheduled Meds:    albumin human 25%  25 g Intravenous TID    cetirizine  10 mg Oral Daily    lactulose  30 g Oral QID    pantoprazole  40 mg Oral Daily    potassium, sodium phosphates  1 packet Oral QID (AC & HS)    predniSONE  40 mg Oral Daily    spironolactone  300 mg Oral Daily     Continuous Infusions:    PRN Meds:acetaminophen, dextrose 50%, dextrose 50%, glucagon (human recombinant), glucose, glucose, influenza, omnipaque, ondansetron  Anticoagulants/Antiplatelets: no  "anticoagulation    Allergies:   Review of patient's allergies indicates:   Allergen Reactions    Ferrous sulfate Other (See Comments)     Patient states the pill makes her sick. She stated she would rather have a shot     Sedation Hx: have not been any systemic reactions    Labs:    Recent Labs  Lab 10/24/17  0428   INR 1.9*       Recent Labs  Lab 10/24/17  0428   WBC 17.97*   HGB 11.4*   HCT 31.9*   MCV 77*   *      Recent Labs  Lab 10/20/17  1352  10/24/17  0427   GLU  --   < > 94   NA  --   < > 126*   K  --   < > 4.1   CL  --   < > 89*   CO2  --   < > 25   BUN  --   < > 6   CREATININE  --   < > 0.8   CALCIUM  --   < > 8.8   MG  --   < > 1.6   ALT  --   < > 27   AST  --   < > 66*   ALBUMIN  --   < > 2.2*   BILITOT  --   < > 11.3*   BILIDIR 7.9*  --   --    < > = values in this interval not displayed.      Vitals:  Temp: 98.6 °F (37 °C) (10/24/17 1144)  Pulse: (!) 113 (10/24/17 1554)  Resp: 16 (10/24/17 1554)  BP: 138/73 (10/24/17 1554)  SpO2: 99 % (10/24/17 1554)     Physical Exam:  ASA: 2  Mallampati: 2    General: no acute distress  Mental Status: alert and oriented to person, place and time  HEENT: normocephalic, atraumatic  Chest: unlabored breathing  Heart: regular heart rate  Abdomen: nondistended  Extremity: moves all extremities    Plan: Paracentesis followed by percutaneous liver biopsy  Sedation Plan: shanelle Schwab MD ([place])  Radiology PGY-4  [phone]      "

## 2017-10-24 NOTE — SUBJECTIVE & OBJECTIVE
Interval History:     Could not perform IR TJ biopsy yesterday.  Will reattempt today    Feels better.      Current Facility-Administered Medications   Medication    acetaminophen tablet 650 mg    albumin human 25% bottle 25 g    cetirizine tablet 10 mg    dextrose 50% injection 12.5 g    dextrose 50% injection 25 g    glucagon (human recombinant) injection 1 mg    glucose chewable tablet 16 g    glucose chewable tablet 24 g    influenza (FLUZONE,FLUARIX QUADRIVALENT) vaccine 0.5 mL    lactulose 20 gram/30 mL solution Soln 30 g    omnipaque oral solution 15 mL    ondansetron disintegrating tablet 8 mg    pantoprazole EC tablet 40 mg    potassium, sodium phosphates 280-160-250 mg packet 1 packet    predniSONE tablet 40 mg    spironolactone tablet 300 mg       Objective:     Vital Signs (Most Recent):  Temp: 98.6 °F (37 °C) (10/24/17 1144)  Pulse: 108 (10/24/17 1144)  Resp: 16 (10/24/17 1144)  BP: 119/65 (10/24/17 1144)  SpO2: 99 % (10/24/17 1144) Vital Signs (24h Range):  Temp:  [97.6 °F (36.4 °C)-98.6 °F (37 °C)] 98.6 °F (37 °C)  Pulse:  [] 108  Resp:  [14-20] 16  SpO2:  [95 %-100 %] 99 %  BP: (113-143)/(52-76) 119/65     Weight: 71.3 kg (157 lb 3 oz) (10/24/17 1000)  Body mass index is 26.16 kg/m².    Physical Exam   Constitutional: She is oriented to person, place, and time. No distress.   HENT:   Head: Normocephalic and atraumatic.   Eyes: Scleral icterus is present.   Cardiovascular: Normal rate and regular rhythm.    Pulmonary/Chest: Breath sounds normal. No respiratory distress.   Abdominal: Soft. She exhibits distension. There is no tenderness. There is no guarding.   Neurological: She is alert and oriented to person, place, and time.   Skin: Skin is warm and dry. No rash noted.   Psychiatric: She has a normal mood and affect.   Vitals reviewed.      MELD-Na score: 29 at 10/24/2017  4:28 AM  MELD score: 23 at 10/24/2017  4:28 AM  Calculated from:  Serum Creatinine: 0.8 mg/dL (Rounded to  1) at 10/24/2017  4:27 AM  Serum Sodium: 126 mmol/L at 10/24/2017  4:27 AM  Total Bilirubin: 11.3 mg/dL at 10/24/2017  4:27 AM  INR(ratio): 1.9 at 10/24/2017  4:28 AM  Age: 52 years    Significant Labs:  CBC:   Recent Labs  Lab 10/24/17  0428   WBC 17.97*   RBC 4.16   HGB 11.4*   HCT 31.9*   *     BMP:   Recent Labs  Lab 10/24/17  0427   GLU 94   *   K 4.1   CL 89*   CO2 25   BUN 6   CREATININE 0.8   CALCIUM 8.8     CMP:   Recent Labs  Lab 10/24/17  0427   GLU 94   CALCIUM 8.8   ALBUMIN 2.2*   PROT 6.7   *   K 4.1   CO2 25   CL 89*   BUN 6   CREATININE 0.8   ALKPHOS 160*   ALT 27   AST 66*   BILITOT 11.3*     Coagulation:   Recent Labs  Lab 10/24/17  0428   INR 1.9*       Significant Imaging:  Labs: Reviewed     IR pressures reviewed: portal gradient is 46

## 2017-10-24 NOTE — PROCEDURES
Radiology Post-Procedure Note    Pre Op Diagnosis: Elevated LFTs.    Post Op Diagnosis: Same    Procedure: US guided random liver biopsy    Procedure performed by: Adrian Callahan MD    Written Informed Consent Obtained: Yes  Specimen Removed: YES 2 x 18 gauge liver cores  Estimated Blood Loss: Minimal    Findings:   Under US guidance, 17/18 gauge system was used to obtain two samples of the left liver lobe. Tract embolized with Gelfoam. No complications.    Patient tolerated procedure well.    Adrian Callahan M.D.  Diagnostic and Interventional Radiologist  Department of Radiology  Pager: [phone]

## 2017-10-24 NOTE — ASSESSMENT & PLAN NOTE
Hx of cirrhosis, presumed secondary to PERKINS and Etoh, who presents from clinic with labs concerning for leukocytosis, worsening LFTs and hypokalemia.    Workup for infectious source unrevealing as of now, with SBP negative, normal UA, normal CXR. US doppler fairly unremarkable.  Her history of etoh use varies between providers, and these findings may suggest ongoing Etoh use (leukocytosis, AST > ALT, high Tbili)    CT TP showing indeterminate 1 cm lesions, cannot rule out HCC.  PETH returned positive. Pt adamantly denies drinking etoh in the past months.  DF is 47 (10/24)      Plan:  Given concern for Etoh hepatitis, will start prednisone 40mg daily today  IR for TJ liver biopsy today   - we feel percutaneous biopsy is too high risk given INR  Paracentesis prior to liver biopsy  Will ask addiction pysonia for discuss with patient the positive PETH and denial of etoh  aldactone 300mg daily for diuresis (and given low K)  Continue lactulose and rifixamin  Aggressively replete Mag with K  Strict I/ Os ; 1 L fluid restrict    Pt approved for liver transplant evaluation as inpatient.  Addiction psych has deemed patient HIGH risk and advises program for rehab.  Will need outpatient alcohol program and repeat CT TP in 3 months

## 2017-10-24 NOTE — PHYSICIAN QUERY
PT Name: Marcie Bazan  MR #: [id number]     Physician Query Form - Documentation Clarification      CDS/: Agata Wayne RN, CCDS               Contact information:  [email or web address]    This form is a permanent document in the medical record.     Query Date: October 24, 2017    By submitting this query, we are merely seeking further clarification of documentation. Please utilize your independent clinical judgment when addressing the question(s) below.    The Medical record reflects the following:    Supporting Clinical Findings Location in Medical Record   PMHx of HTN and cirrhosis presents to the ED for admission. She was seen in the liver transplant clinic for eval for liver transplant.     Decompensated hepatic cirrhosis  PERKINS and Alcoholic with ascites  MELD-Na score: 28 at 10/19/2017  9:29 PM  MELD score: 25 at 10/19/2017  9:29 PM  - unclear what caused recent decompensation, however based on elevated WBC and LFT pattern, it seems like patient may still be drinking;     Alcoholic hepatitis with ascites  NAFLD (nonalcoholic fatty liver disease)    Ammonia  63-->73    MELD-Na score: 28 at 10/20/2017  4:13 AM  MELD score: 24 at 10/20/2017  4:13 AM    Hepatic encephalopathy      - improved with lactulose; add rifaximin  H & P 10/19        H & P 10/19                H & P 10/19      Lab  10/19/17-->10/20/17    Hospital Medicine progress note 10/20      Hospital Medicine progress note 10/20                                                                                      Doctor, Please specify diagnosis or diagnoses associated with above clinical findings.  Please further specify hepatic encephalopathy.    Provider Use Only      [  x ] Acute or subacute  [   ] Alcoholic  [   ] Chronic  [   ] Unspecified  [   ] Other (specify) __________                                                                                                                     [  ] Clinically undetermined

## 2017-10-24 NOTE — PROGRESS NOTES
Patient off floor at time of being seen.  Pre Transplant for left to be completed.  Will see in am.    Pola Vance PA-C  Pager [phone]

## 2017-10-24 NOTE — NURSING TRANSFER
Nursing Transfer Note      10/24/2017     Transfer To: 1001A    Transfer via stretcher    Transported by transport team    Chart send with patient: Yes

## 2017-10-24 NOTE — NURSING
PRE-TRANSPLANT NOTE:    This patient's medication therapy was evaluated as part of her pre-transplant evaluation.    The following pharmacologic concerns were noted: prednisone 40 mg    No current facility-administered medications for this visit.      No current outpatient prescriptions on file.     Facility-Administered Medications Ordered in Other Visits   Medication Dose Route Frequency Provider Last Rate Last Dose    acetaminophen tablet 650 mg  650 mg Oral Q4H PRN Burton Vora MD        albumin human 25% bottle 25 g  25 g Intravenous TID Burton Vora MD        cetirizine tablet 10 mg  10 mg Oral Daily Burton Vora MD   10 mg at 10/24/17 0900    dextrose 50% injection 12.5 g  12.5 g Intravenous PRN Henry Joseph MD        dextrose 50% injection 25 g  25 g Intravenous PRN Henry Joseph MD        furosemide tablet 40 mg  40 mg Oral Daily Burton Vora MD   40 mg at 10/24/17 0900    glucagon (human recombinant) injection 1 mg  1 mg Intramuscular PRN Henry Joseph MD        glucose chewable tablet 16 g  16 g Oral PRN Henry Joseph MD        glucose chewable tablet 24 g  24 g Oral PRN Henry Joseph MD        influenza (FLUZONE,FLUARIX QUADRIVALENT) vaccine 0.5 mL  0.5 mL Intramuscular vaccine x 1 dose Burton Vora MD        lactulose 20 gram/30 mL solution Soln 30 g  30 g Oral QID Burton Vora MD   30 g at 10/24/17 0610    omnipaque oral solution 15 mL  15 mL Oral PRN Thor Villanueva MD   15 mL at 10/20/17 1259    ondansetron disintegrating tablet 8 mg  8 mg Oral Q8H PRN Burton Vora MD   8 mg at 10/19/17 2129    pantoprazole EC tablet 40 mg  40 mg Oral Daily Burton Vora MD   40 mg at 10/24/17 0900    potassium, sodium phosphates 280-160-250 mg packet 1 packet  1 packet Oral QID (AC & HS) Burton Vora MD   1 packet at 10/23/17 2210    predniSONE tablet 40 mg  40 mg Oral Daily Burton Vora MD        spironolactone tablet 300 mg  300 mg Oral Daily Stalin  Yves Poewrs MD   300 mg at 10/24/17 0900       I am available for consultation and can be contacted, as needed by the other members of the Liver Transplant team.

## 2017-10-24 NOTE — PROGRESS NOTES
Caregiver and Substance Abuse Follow Up:  SW met with pt's sister-in-law Sally Pitcher ([phone]) along with Sally's niece Miley Fuentes. Pt was away for a paracentesis during caregiver meeting. Sally reports that she can commit to providing 24/7 care to patient beginning on  afternoons through Wednesday night. Sally endorsed concerns about finding another person to care for pt as daughter is 8 months pregnant and Sally does not feel that pt's son Duarte or sisters Yani and Patricia would have the time to care for pt. Sally also endorsed concerns about pt's friend Ross providing care to pt. Sally reports that Ross has a lot of medical issues and lives in [place], [place] where he has doctor appointments to attend on a continuous basis. Sally reports that she believes Ross would make his best effort to care for pt, but he does not have transportation to drive to and from [place] and would be driving the pt's car around, let alone Rsos has half of his foot amputated. Sally reports that she will touch base with pt's daughter and that they will try to put their heads together to see if they can come up with someone to stay with pt  through  mornings. Sally aware that daughter given sitter services. Miley Fuentes reports that she would do what she could to help out but could not make a commitment for any specific days at this time.     Sally and Miley also both reported to SW that pt has been drinking ETOH on a daily basis for over 30 years. Sally reports that she believes pt's drinking may have increased when pt's sister  3-4 years ago. Sally reports that pt has always been the caregiver to everyone and that she has also witnessed a lot of friends and family pass away which has been difficult for the pt. Sally stated that she and Miley confronted pt on her drinking this afternoon and that pt denied any ETOH use for the last number of months. Sally reports that she believes pt  would benefit from an inpatient ETOH rehab program versus IOP due to how long pt has been drinking. Sally described pt as a functional alcoholic stating that it was never obvious that pt was drinking as she never appeared drunk and could still cook, clean, and care for her family. SW notified Sally that pt will likely need to complete rehab prior to being approved for transplant given that pt had positive PETH and denied any recent drinking despite this. Sally expressed understanding.     Psychosocial Suitability: Patient remains an unacceptable candidate for liver transplant at this time. Based on psychosocial risk factors, patient presents as high risk due inadequate caregiver plan and a positive PETH test despite pt's continued denial that she recently drank ETOH. In addition, pt has very limited finances but does get financial support from family when needed. Protective factors include one committed caregiver who can stay with pt Sunday-Wed each week, as well as plans for family to continue working on someone who can stay with pt the rest of the week. Sitter services provided to daughter a few days ago upon daughter's request. Pt also has no reported mental health history and daughter reports that pt will go to rehab if she is told that she needs treatment in order to receive liver transplant.      Recommendations/Additional Comments:   -inpatient ETOH rehab (Addiction Psych providing resources to pt and family)  -serial PETH tests and utox screens  -concrete and safe caregiver plan (daughter 8 months pregnant and close friend Ross has medical and transportation issues)  -local lodging  -fundraising

## 2017-10-24 NOTE — PROGRESS NOTES
Pt arrived to ROCU bed 1 for 1 hour post Paracentesis/u/s guided liver biopsy  recovery. Report received from LYN George RN. Pt denies pain/discomfort. Dressing CDI. VSS. No acute events. See flow sheets for post procedure monitoring.

## 2017-10-24 NOTE — CONSULTS
Consult Note  Liver Transplant Surgery    Consult Requested By: Mica Briseno RN  Reason for Consult: Liver transplant evaluation    SUBJECTIVE:     History of Present Illness: Patient is a 52 y.o. female with liver disease due to alcoholic liver disease.  Symptoms and complications of liver disease include ascites (recurrent paracentesis needed), edema, encephalopathy, esophageal or gastric varices, fatigue, hyponatremia, jaundice, portal hypertension and thrombocytopenia.     Scheduled Meds:   cetirizine  10 mg Oral Daily    furosemide  40 mg Oral Daily    lactulose  30 g Oral QID    pantoprazole  40 mg Oral Daily    potassium, sodium phosphates  1 packet Oral QID (AC & HS)    spironolactone  300 mg Oral Daily     Continuous Infusions:   PRN Meds:acetaminophen, dextrose 50%, dextrose 50%, glucagon (human recombinant), glucose, glucose, influenza, omnipaque, ondansetron    Review of patient's allergies indicates:   Allergen Reactions    Ferrous sulfate Other (See Comments)     Patient states the pill makes her sick. She stated she would rather have a shot       Past Medical History:   Diagnosis Date    Hypertension     Liver cirrhosis      Past Surgical History:   Procedure Laterality Date    BREAST SURGERY      CHOLECYSTECTOMY      HYSTERECTOMY       Family History   Problem Relation Age of Onset    Hypertension Mother     Hypertension Father     Diabetes Father     Diabetes Sister     Depression Maternal Grandfather      Social History   Substance Use Topics    Smoking status: Never Smoker    Smokeless tobacco: Never Used    Alcohol use Yes      Comment: occasionally        Review of Systems:  Constitutional: no fever or chills, positive for fatigue and malaise  Eyes: no visual changes, positive for icterus  Respiratory: positive for dyspnea on exertion, negative for hemoptysis, stridor and wheezing  Cardiovascular: no chest pain or palpitations  Gastrointestinal: positive for abdominal pain,  jaundice and distension  Musculoskeletal: positive for arthralgias and muscle weakness  Neurological: no seizures or tremors, positive for gait problems and memory problems    OBJECTIVE:     Vital Signs (Most Recent)  Temp: 98.3 °F (36.8 °C) (10/24/17 0440)  Pulse: 102 (10/24/17 0440)  Resp: 16 (10/24/17 0440)  BP: (!) 113/52 (10/24/17 0440)  SpO2: 98 % (10/24/17 0440)    Vital Signs Range (Last 24H):  Temp:  [97.6 °F (36.4 °C)-98.5 °F (36.9 °C)]   Pulse:  []   Resp:  [14-18]   BP: (104-143)/(52-76)   SpO2:  [95 %-100 %]     Physical Exam:  General: well developed, appears older than stated age, icteric, no distress, mildly obese  Eyes: positive findings: sclera icteric.  Lungs:  normal respiratory effort and diminished breath sounds bibasilar  Cardiovascular: Heart: regular rate and rhythm, S1, S2 normal, no murmur, click, rub or gallop. Chest Wall: no tenderness. Extremities: no cyanosis or edema, or clubbing. Pulses: 2+ and symmetric.  Abdomen/Rectal: Abdomen: abnormal findings:  ascites, distended, obese and shifting dullness. Laparoscopic port sites scars (cholecystectomy). Rectal: not examined  Skin: jaundice noted  Neurologic: Normal strength and tone. No focal numbness or weakness  Mental status: Alert, oriented, thought content appropriate, alertness: alert    Laboratory:  CBC:   Recent Labs  Lab 10/23/17  0523 10/24/17  0428   WBC 14.54* 17.97*   RBC 3.96* 4.16   HGB 10.6* 11.4*   HCT 29.8* 31.9*   *  --      CMP:   Recent Labs  Lab 10/24/17  0427   GLU 94   CALCIUM 8.8   ALBUMIN 2.2*   PROT 6.7   *   K 4.1   CO2 25   CL 89*   BUN 6   CREATININE 0.8   ALKPHOS 160*   ALT 27   AST 66*   BILITOT 11.3*     Coagulation:   Recent Labs  Lab 10/24/17  0428   INR 1.9*         ASSESSMENT/PLAN:     Patient Active Problem List   Diagnosis    Allergic rhinitis    Anemia    Decompensated hepatic cirrhosis    Erosive esophagitis    Essential hypertension    Flatulence/gas pain/belching     Hemoptysis    History of abnormal cervical Pap smear    History of Helicobacter pylori infection    Lipoma    Low back pain    Multinodular thyroid    NAFLD (nonalcoholic fatty liver disease)    Obesity (BMI 30-39.9)    Seasonal allergic rhinitis    Jaundice    Ascites    Coagulopathy    Leucocytosis    Hypokalemia    Hypomagnesemia    Hyponatremia    Serum ammonia increased    Elevated AFP    Elevated lactic acid level    Alcoholic hepatitis with ascites    Moderate protein-calorie malnutrition    Severe protein-calorie malnutrition    Hepatic encephalopathy    HCC (hepatocellular carcinoma)       Transplant Candidacy: Patient is a 52 y.o. year old female with alcoholic liver disease being evaluated for possible OLT.  In my opinion, she is a suitable liver transplant candidate, from the surgical standpoint.  She will be presented to selection committee after all tests and evaluations are complete.      UNOS Patient Status  Functional Status: 30% - Severely disabled: hospitalization is indicated, death not imminent  Physical Capacity: Limited Mobility      Counseling: I discussed with the patient the benefits of liver transplantation.  We discussed the evaluation and listing procedures.  We discussed the MELD system and the associated waiting times.  We discussed national and center specific survival results.  We discussed the option of being multiply listed in different OPOs.   We discussed the risks, benefits and potential complications related to surgery including the risks related to anesthesia, bleeding, infection, primary non-function of the allograft, the risk of reoperation as well as the risk of death.  We discussed the typical post-operative course, length of hospitalization, the long-term use of immunosuppressive therapy as well as the need for long-term routine follow-up.    PHS: I discussed the use of organs from donors with PHS increased-risk behavior, including the testing  protocols utilized, as well as data from the literature regarding the likelihood of transmission of hepatitis or HIV.  The patient that she is willing to consider such grafts.  DCD: I discussed the use of organs recovered by donation after cardiac death (DCD), including slightly decreased graft survival and greater risk of arterial and biliary complications. The potential advantage to the recipient is possibly receiving a transplant sooner by accepting such an organ. The patient that she is willing to consider such grafts.  HBcAb: I discussed the use of organs from donors with HBcAb in conjunction with long term use of HBV antiviral drugs, such as lamivudine. The small but measurable risk of hepatitis B seroconversion was discussed as well as the potentially life long need to continue antiviral drugs. The patient that she is willing to consider such grafts.

## 2017-10-25 ENCOUNTER — COMMITTEE REVIEW (OUTPATIENT)
Dept: TRANSPLANT | Facility: CLINIC | Age: 52
End: 2017-10-25

## 2017-10-25 ENCOUNTER — TELEPHONE (OUTPATIENT)
Dept: TRANSPLANT | Facility: CLINIC | Age: 52
End: 2017-10-25

## 2017-10-25 VITALS
WEIGHT: 156.5 LBS | DIASTOLIC BLOOD PRESSURE: 60 MMHG | SYSTOLIC BLOOD PRESSURE: 127 MMHG | HEART RATE: 99 BPM | HEIGHT: 65 IN | TEMPERATURE: 99 F | BODY MASS INDEX: 26.08 KG/M2 | RESPIRATION RATE: 18 BRPM | OXYGEN SATURATION: 96 %

## 2017-10-25 LAB
ALBUMIN SERPL BCP-MCNC: 3.1 G/DL
ALP SERPL-CCNC: 128 U/L
ALT SERPL W/O P-5'-P-CCNC: 20 U/L
ANION GAP SERPL CALC-SCNC: 11 MMOL/L
AST SERPL-CCNC: 44 U/L
BASOPHILS # BLD AUTO: 0.02 K/UL
BASOPHILS NFR BLD: 0.1 %
BILIRUB SERPL-MCNC: 9.8 MG/DL
BUN SERPL-MCNC: 9 MG/DL
CALCIUM SERPL-MCNC: 9.3 MG/DL
CHLORIDE SERPL-SCNC: 91 MMOL/L
CO2 SERPL-SCNC: 25 MMOL/L
CREAT SERPL-MCNC: 0.9 MG/DL
DIFFERENTIAL METHOD: ABNORMAL
EOSINOPHIL # BLD AUTO: 0 K/UL
EOSINOPHIL NFR BLD: 0.1 %
ERYTHROCYTE [DISTWIDTH] IN BLOOD BY AUTOMATED COUNT: 24.7 %
EST. GFR  (AFRICAN AMERICAN): >60 ML/MIN/1.73 M^2
EST. GFR  (NON AFRICAN AMERICAN): >60 ML/MIN/1.73 M^2
GLUCOSE SERPL-MCNC: 118 MG/DL
HCT VFR BLD AUTO: 25.1 %
HGB BLD-MCNC: 9 G/DL
IMM GRANULOCYTES # BLD AUTO: 0.14 K/UL
IMM GRANULOCYTES NFR BLD AUTO: 0.9 %
INR PPP: 2
LYMPHOCYTES # BLD AUTO: 1.8 K/UL
LYMPHOCYTES NFR BLD: 12.3 %
MAGNESIUM SERPL-MCNC: 1.3 MG/DL
MCH RBC QN AUTO: 26.9 PG
MCHC RBC AUTO-ENTMCNC: 35.9 G/DL
MCV RBC AUTO: 75 FL
MONOCYTES # BLD AUTO: 1.2 K/UL
MONOCYTES NFR BLD: 7.8 %
NEUTROPHILS # BLD AUTO: 11.8 K/UL
NEUTROPHILS NFR BLD: 78.8 %
NRBC BLD-RTO: 0 /100 WBC
PHOSPHATE SERPL-MCNC: 2.5 MG/DL
PLATELET # BLD AUTO: 107 K/UL
PMV BLD AUTO: 10.1 FL
POTASSIUM SERPL-SCNC: 3.7 MMOL/L
PROT SERPL-MCNC: 6.6 G/DL
PROTHROMBIN TIME: 20.5 SEC
RBC # BLD AUTO: 3.34 M/UL
SODIUM SERPL-SCNC: 127 MMOL/L
STRONGYLOIDES ANTIBODY IGG: NEGATIVE
VARICELLA INTERPRETATION: POSITIVE
VARICELLA ZOSTER IGG: 4.46 ISR
WBC # BLD AUTO: 14.93 K/UL

## 2017-10-25 PROCEDURE — 63600175 PHARM REV CODE 636 W HCPCS: Mod: NTX | Performed by: HOSPITALIST

## 2017-10-25 PROCEDURE — 99233 SBSQ HOSP IP/OBS HIGH 50: CPT | Mod: ,,, | Performed by: HOSPITALIST

## 2017-10-25 PROCEDURE — 63600175 PHARM REV CODE 636 W HCPCS: Mod: NTX | Performed by: PHYSICIAN ASSISTANT

## 2017-10-25 PROCEDURE — 80053 COMPREHEN METABOLIC PANEL: CPT

## 2017-10-25 PROCEDURE — 99233 SBSQ HOSP IP/OBS HIGH 50: CPT | Mod: ,,, | Performed by: PHYSICIAN ASSISTANT

## 2017-10-25 PROCEDURE — 90471 IMMUNIZATION ADMIN: CPT | Mod: NTX | Performed by: HOSPITALIST

## 2017-10-25 PROCEDURE — 85025 COMPLETE CBC W/AUTO DIFF WBC: CPT

## 2017-10-25 PROCEDURE — 25000003 PHARM REV CODE 250: Mod: NTX | Performed by: INTERNAL MEDICINE

## 2017-10-25 PROCEDURE — 90472 IMMUNIZATION ADMIN EACH ADD: CPT | Mod: NTX | Performed by: HOSPITALIST

## 2017-10-25 PROCEDURE — 3E0234Z INTRODUCTION OF SERUM, TOXOID AND VACCINE INTO MUSCLE, PERCUTANEOUS APPROACH: ICD-10-PCS | Performed by: HOSPITALIST

## 2017-10-25 PROCEDURE — 90471 IMMUNIZATION ADMIN: CPT | Mod: NTX | Performed by: PHYSICIAN ASSISTANT

## 2017-10-25 PROCEDURE — P9047 ALBUMIN (HUMAN), 25%, 50ML: HCPCS | Mod: NTX | Performed by: HOSPITALIST

## 2017-10-25 PROCEDURE — 84100 ASSAY OF PHOSPHORUS: CPT

## 2017-10-25 PROCEDURE — 83735 ASSAY OF MAGNESIUM: CPT

## 2017-10-25 PROCEDURE — 90670 PCV13 VACCINE IM: CPT | Mod: NTX | Performed by: PHYSICIAN ASSISTANT

## 2017-10-25 PROCEDURE — 90686 IIV4 VACC NO PRSV 0.5 ML IM: CPT | Mod: NTX | Performed by: HOSPITALIST

## 2017-10-25 PROCEDURE — 85610 PROTHROMBIN TIME: CPT

## 2017-10-25 PROCEDURE — 25000003 PHARM REV CODE 250: Mod: NTX | Performed by: HOSPITALIST

## 2017-10-25 RX ORDER — MAGNESIUM SULFATE HEPTAHYDRATE 40 MG/ML
2 INJECTION, SOLUTION INTRAVENOUS ONCE
Status: COMPLETED | OUTPATIENT
Start: 2017-10-25 | End: 2017-10-25

## 2017-10-25 RX ORDER — SPIRONOLACTONE 100 MG/1
300 TABLET, FILM COATED ORAL DAILY
Qty: 90 TABLET | Refills: 1 | Status: ON HOLD | OUTPATIENT
Start: 2017-10-26 | End: 2017-11-15

## 2017-10-25 RX ORDER — PREDNISONE 20 MG/1
40 TABLET ORAL DAILY
Qty: 60 TABLET | Refills: 1 | Status: ON HOLD | OUTPATIENT
Start: 2017-10-26 | End: 2017-11-28

## 2017-10-25 RX ORDER — LACTULOSE 10 G/15ML
30 SOLUTION ORAL 4 TIMES DAILY
Qty: 1800 ML | Refills: 2 | Status: SHIPPED | OUTPATIENT
Start: 2017-10-25 | End: 2017-11-04

## 2017-10-25 RX ORDER — FUROSEMIDE 40 MG/1
40 TABLET ORAL DAILY
Qty: 30 TABLET | Refills: 1 | Status: ON HOLD | OUTPATIENT
Start: 2017-10-25 | End: 2017-11-15

## 2017-10-25 RX ADMIN — MAGNESIUM SULFATE IN WATER 2 G: 40 INJECTION, SOLUTION INTRAVENOUS at 09:10

## 2017-10-25 RX ADMIN — PREDNISONE 40 MG: 20 TABLET ORAL at 08:10

## 2017-10-25 RX ADMIN — SPIRONOLACTONE 300 MG: 100 TABLET ORAL at 08:10

## 2017-10-25 RX ADMIN — POTASSIUM & SODIUM PHOSPHATES POWDER PACK 280-160-250 MG 1 PACKET: 280-160-250 PACK at 11:10

## 2017-10-25 RX ADMIN — PANTOPRAZOLE SODIUM 40 MG: 40 TABLET, DELAYED RELEASE ORAL at 08:10

## 2017-10-25 RX ADMIN — ALBUMIN (HUMAN) 25 G: 12.5 SOLUTION INTRAVENOUS at 05:10

## 2017-10-25 RX ADMIN — INFLUENZA A VIRUS A/MICHIGAN/45/2015 X-275 (H1N1) ANTIGEN (FORMALDEHYDE INACTIVATED), INFLUENZA A VIRUS A/HONG KONG/4801/2014 X-263B (H3N2) ANTIGEN (FORMALDEHYDE INACTIVATED), INFLUENZA B VIRUS B/PHUKET/3073/2013 ANTIGEN (FORMALDEHYDE INACTIVATED), AND INFLUENZA B VIRUS B/BRISBANE/60/2008 ANTIGEN (FORMALDEHYDE INACTIVATED) 0.5 ML: 15; 15; 15; 15 INJECTION, SUSPENSION INTRAMUSCULAR at 11:10

## 2017-10-25 RX ADMIN — LACTULOSE 30 G: 20 SOLUTION ORAL at 11:10

## 2017-10-25 RX ADMIN — POTASSIUM & SODIUM PHOSPHATES POWDER PACK 280-160-250 MG 1 PACKET: 280-160-250 PACK at 05:10

## 2017-10-25 RX ADMIN — PNEUMOCOCCAL 13-VALENT CONJUGATE VACCINE 0.5 ML: 2.2; 2.2; 2.2; 2.2; 2.2; 4.4; 2.2; 2.2; 2.2; 2.2; 2.2; 2.2; 2.2 INJECTION, SUSPENSION INTRAMUSCULAR at 01:10

## 2017-10-25 RX ADMIN — LACTULOSE 30 G: 20 SOLUTION ORAL at 05:10

## 2017-10-25 RX ADMIN — CETIRIZINE HYDROCHLORIDE 10 MG: 10 TABLET, FILM COATED ORAL at 08:10

## 2017-10-25 NOTE — ASSESSMENT & PLAN NOTE
PERKINS and Alcoholic with ascites  MELD-Na score: 29 at 10/24/2017  4:28 AM  MELD score: 23 at 10/24/2017  4:28 AM  Calculated from:  Serum Creatinine: 0.8 mg/dL (Rounded to 1) at 10/24/2017  4:27 AM  Serum Sodium: 126 mmol/L at 10/24/2017  4:27 AM  Total Bilirubin: 11.3 mg/dL at 10/24/2017  4:27 AM  INR(ratio): 1.9 at 10/24/2017  4:28 AM  Age: 52 years  - hepatology consult  - unclear what caused recent decompensation, however based on elevated WBC and LFT pattern, it seems like patient may still be drinking; had a tap in the ED, negative for SBP  -  PETH positive; denying alcohol use still  - addiction psych consult, they state high risk will re-evaluate after IOP and AA meetings  - patient has been approved for inpatient liver transplant evaluation   - IR paracentesis done on 10/20 with 4 L removed   - Dobutamine stress test is negative for ischemia; EGD in AM;

## 2017-10-25 NOTE — PROGRESS NOTES
KARLA received call from pt's RUDOLPH Sally Pitcher ([phone]) who reports that pt will attempt to enroll in [facility] program connected to the [facility] as pt declined for liver transplant due to ongoing ETOH use. Sally reports that hospital will need to make referral in order for pt to be considered. KARLA expressed understanding and will follow up with [facility] to complete referral. KARLA remains available.    KARLA contacted Isaura at [facility] ([phone]) and reviewed pt's case regarding long history of daily ETOH use. KARLA notified by Isaura to fill out application and fax clinical in order for referral to be considered. KARLA expressed understanding and faxed application and clinical to [phone]. KARLA notified pt's RUDOLPH of completed fax. RUDOLPH will f/u with SW if she has any further questions. KARLA remains available.

## 2017-10-25 NOTE — ASSESSMENT & PLAN NOTE
- s/p liver biopsy on 10/24; started on prednisone 40 mg daily today; follow up results; will send out on prednisone 40 mg daily

## 2017-10-25 NOTE — PROGRESS NOTES
[facility] Hospital Medicine  Progress Note    Patient Name: Marcie Bazan  MRN: [id number]  Patient Class: IP- Inpatient   Admission Date: 10/19/2017  Length of Stay: 5 days  Attending Physician: Burton Vora MD  Primary Care Provider: Milton Ferrer, [facility] Medicine Team: [facility] MED A Burton Vora MD    Subjective:     Principal Problem:Decompensated hepatic cirrhosis    HPI:  51 y/o AAF with PMHx of HTN and cirrhosis presents to the ED for admission. She was seen in the liver transplant clinic for eval for liver transplant. She was noted to have hypokalemia and leukocytosis so was sent to the ED for further evaluation. She reports fatigue, decreased appetite, chills, abdominal distention, abdominal pain, constipation, difficulty urinating, lightheadedness. She has PRN paracentesis (last in early October). She reports that she is taking lactulose. She is on potassium supplementation but is unable to tolerate liquid potassium (causes emesis), she was changed to K tablets today (has not yet filled this). She denies fever, chest pain, diarrhea, dysuria, LE edema. She states heavy use of alcohol in the past, but denies any recent use in past six months and denies tobacco or drug use.      Hospital Course:  No notes on file    Interval History: patient underwent dobutamine stress test which was negative for ischemia; started on prednisone for alcohol hepatitis; had 3.5 L of fluid removed and then liver biopsy performed;will make NPO for possible EGD in the AM; likely discharge tomorrow vs Wednesday and IOP therapy      Review of Systems   Constitutional: Negative for chills and fever.   HENT: Negative for rhinorrhea and sore throat.    Eyes: Negative for pain and discharge.   Respiratory: Negative for cough and shortness of breath.    Cardiovascular: Negative for chest pain and leg swelling.   Gastrointestinal: Positive for abdominal distention. Negative for abdominal pain,  blood in stool, nausea and vomiting.   Endocrine: Negative for cold intolerance and heat intolerance.   Genitourinary: Negative for dysuria and flank pain.   Neurological: Negative for dizziness and numbness.   Psychiatric/Behavioral: Negative for behavioral problems and confusion.     Objective:     Vital Signs (Most Recent):  Temp: 98.4 °F (36.9 °C) (10/24/17 1943)  Pulse: 102 (10/24/17 1943)  Resp: 16 (10/24/17 1943)  BP: 125/69 (10/24/17 1943)  SpO2: 96 % (10/24/17 1943) Vital Signs (24h Range):  Temp:  [97.6 °F (36.4 °C)-98.6 °F (37 °C)] 98.4 °F (36.9 °C)  Pulse:  [101-113] 102  Resp:  [16-20] 16  SpO2:  [96 %-100 %] 96 %  BP: (113-138)/(52-75) 125/69     Weight: 71.3 kg (157 lb 3 oz)  Body mass index is 26.16 kg/m².    Intake/Output Summary (Last 24 hours) at 10/24/17 1955  Last data filed at 10/24/17 1823   Gross per 24 hour   Intake              630 ml   Output             3500 ml   Net            -2870 ml      Physical Exam   Constitutional: She is oriented to person, place, and time. She appears well-developed and well-nourished.   HENT:   Head: Normocephalic and atraumatic.   Eyes: Conjunctivae are normal. Pupils are equal, round, and reactive to light. Scleral icterus is present.   Neck: Normal range of motion. No thyromegaly present.   Cardiovascular: Normal rate, regular rhythm and normal heart sounds.    Pulmonary/Chest: Effort normal and breath sounds normal.   Abdominal: Soft. She exhibits distension. There is no tenderness.   Musculoskeletal: Normal range of motion. She exhibits no edema.   Neurological: She is alert and oriented to person, place, and time.   Skin: No erythema. No pallor.       MELD-Na score: 29 at 10/24/2017  4:28 AM  MELD score: 23 at 10/24/2017  4:28 AM  Calculated from:  Serum Creatinine: 0.8 mg/dL (Rounded to 1) at 10/24/2017  4:27 AM  Serum Sodium: 126 mmol/L at 10/24/2017  4:27 AM  Total Bilirubin: 11.3 mg/dL at 10/24/2017  4:27 AM  INR(ratio): 1.9 at 10/24/2017  4:28  AM  Age: 52 years    Significant Labs:  CBC:    Recent Labs  Lab 10/23/17  0523 10/24/17  0428   WBC 14.54* 17.97*   HGB 10.6* 11.4*   HCT 29.8* 31.9*   * 105*     CMP:    Recent Labs  Lab 10/23/17  0524 10/24/17  0427   * 126*   K 2.8* 4.1   CL 89* 89*   CO2 26 25    94   BUN 5* 6   CREATININE 0.8 0.8   CALCIUM 8.3* 8.8   PROT 6.5 6.7   ALBUMIN 2.1* 2.2*   BILITOT 10.0* 11.3*   ALKPHOS 158* 160*   AST 65* 66*   ALT 25 27   ANIONGAP 11 12   EGFRNONAA >60.0 >60.0     PTINR:    Recent Labs  Lab 10/23/17  0523 10/24/17  0428   INR 2.0* 1.9*       Significant Procedures:   Dobutamine Stress Test with Color Flow:   Results for orders placed or performed during the hospital encounter of 10/19/17   Dobutamine Stress Test w/ Color Flow   Result Value Ref Range    EF 73 55 - 65    Diastolic Dysfunction No     Est. PA Systolic Pressure 21.32     Tricuspid Valve Regurgitation TRIVIAL            Assessment/Plan:      * Decompensated hepatic cirrhosis    PERKINS and Alcoholic with ascites  MELD-Na score: 29 at 10/24/2017  4:28 AM  MELD score: 23 at 10/24/2017  4:28 AM  Calculated from:  Serum Creatinine: 0.8 mg/dL (Rounded to 1) at 10/24/2017  4:27 AM  Serum Sodium: 126 mmol/L at 10/24/2017  4:27 AM  Total Bilirubin: 11.3 mg/dL at 10/24/2017  4:27 AM  INR(ratio): 1.9 at 10/24/2017  4:28 AM  Age: 52 years  - hepatology consult  - unclear what caused recent decompensation, however based on elevated WBC and LFT pattern, it seems like patient may still be drinking; had a tap in the ED, negative for SBP  -  PETH positive; denying alcohol use still  - addiction psych consult, they state high risk will re-evaluate after IOP and AA meetings  - patient has been approved for inpatient liver transplant evaluation   - IR paracentesis done on 10/20 with 4 L removed   - Dobutamine stress test is negative for ischemia; EGD in AM;           HCC (hepatocellular carcinoma)    - as seen on CT triple phase; 1 cm focus on inferior  hepatic lobe;           Hepatic encephalopathy    - improved with lactulose; add rifaximin           Severe protein-calorie malnutrition    - has hardly had any appetite in weeks; PAB; dietary and ensure          Alcoholic hepatitis with ascites    - s/p liver biopsy on 10/24; started on prednisone 40 mg daily today          Elevated AFP    - no clear mass seen on U/S; possible PVT; will get CT abdomen pelvis triple phase for further evaluation           Hyponatremia    - fluid restriction to 1200 cc          Hypomagnesemia    - replace           Hypokalemia    - replace           Leucocytosis    - no current evidence of infection; U/A and CXR negative; SBP negative; blood cultures negative  - likely due to alcoholic hepatitis           Coagulopathy    - started on vitamin K for 3 days           Ascites    - see above           NAFLD (nonalcoholic fatty liver disease)    - see decompensated cirrhosis           Anemia    Of chronic disease, monitor             VTE Risk Mitigation         Ordered     Medium Risk of VTE  Once      10/19/17 1631     Place sequential compression device  Until discontinued      10/19/17 1510              Burton Vora MD  Department of Hospital Medicine   [facility]

## 2017-10-25 NOTE — PLAN OF CARE
[facility]    HOME HEALTH ORDERS  FACE TO FACE ENCOUNTER    Patient Name: Marcie Bazan  YOB: 1965    PCP: ODILIA Wall   PCP Address: [address] [place] [address] / [facility] [address]  PCP Phone Number: [phone]  PCP Fax: [phone]    Encounter Date: 10/25/2017    Admit to Home Health    Diagnoses:  Active Hospital Problems    Diagnosis  POA    *Decompensated hepatic cirrhosis [K72.90]  Yes    HCC (hepatocellular carcinoma) [C22.0]  Yes    Hepatic encephalopathy [K72.90]  Yes    Leucocytosis [D72.829]  Yes    Hypokalemia [E87.6]  Yes    Hypomagnesemia [E83.42]  Yes    Hyponatremia [E87.1]  Yes    Serum ammonia increased [E72.20]  Yes    Elevated AFP [R77.2]  Yes    Elevated lactic acid level [R79.89]  Yes    Alcoholic hepatitis with ascites [K70.11]  Yes    Severe protein-calorie malnutrition [E43]  Yes    Coagulopathy [D68.9]  Yes    Ascites [R18.8]  Yes    Jaundice [R17]  Yes    Obesity (BMI 30-39.9) [E66.9]  Yes    Anemia [D64.9]  Yes    NAFLD (nonalcoholic fatty liver disease) [K76.0]  Yes     Overview:   [facility]      Multinodular thyroid [E04.2]  Yes     Overview:   9/24/2014 Thyroid US  Repeat 9/11/2015  Biopsy 10/28/14        Resolved Hospital Problems    Diagnosis Date Resolved POA   No resolved problems to display.       Future Appointments  Date Time Provider Department Center   11/8/2017 4:00 PM Joselin Souza MD [facility] [place]           I have seen and examined this patient face to face today. My clinical findings that support the need for the home health skilled services and home bound status are the following:  Weakness/numbness causing balance and gait disturbance due to Liver Disease making it taxing to leave home.    Allergies:  Review of patient's allergies indicates:   Allergen Reactions    Ferrous sulfate Other (See Comments)     Patient states the pill makes her sick. She stated she would rather have a  shot       Diet: fluid restriction: 1000 and 2 gram sodium diet    Activities: activity as tolerated    Nursing:   SN to complete comprehensive assessment including routine vital signs. Instruct on disease process and s/s of complications to report to MD. Review/verify medication list sent home with the patient at time of discharge  and instruct patient/caregiver as needed. Frequency may be adjusted depending on start of care date.    Notify MD if SBP > 160 or < 90; DBP > 90 or < 50; HR > 120 or < 50; Temp > 101; Other:         CONSULTS:    Physical Therapy to evaluate and treat. Evaluate for home safety and equipment needs; Establish/upgrade home exercise program. Perform / instruct on therapeutic exercises, gait training, transfer training, and Range of Motion.  Occupational Therapy to evaluate and treat. Evaluate home environment for safety and equipment needs. Perform/Instruct on transfers, ADL training, ROM, and therapeutic exercises.      SN to draw weekly labs, CMP, CBC, INR, first draw Friday 10/27/17 and fax to [phone]      Medications: Review discharge medications with patient and family and provide education.      Current Discharge Medication List      START taking these medications    Details   furosemide (LASIX) 40 MG tablet Take 1 tablet (40 mg total) by mouth once daily.  Qty: 30 tablet, Refills: 1      predniSONE (DELTASONE) 20 MG tablet Take 2 tablets (40 mg total) by mouth once daily.  Qty: 60 tablet, Refills: 1         CONTINUE these medications which have CHANGED    Details   lactulose (CHRONULAC) 20 gram/30 mL Soln Take 45 mLs (30 g total) by mouth 4 (four) times daily.  Qty: 1800 mL, Refills: 2      spironolactone (ALDACTONE) 100 MG tablet Take 3 tablets (300 mg total) by mouth once daily.  Qty: 90 tablet, Refills: 1         CONTINUE these medications which have NOT CHANGED    Details   cetirizine (ZYRTEC) 10 MG tablet Take 10 mg by mouth once daily.      omeprazole (PRILOSEC) 40 MG  capsule Take 40 mg by mouth once daily.      ondansetron (ZOFRAN-ODT) 4 MG TbDL Take 1 tablet (4 mg total) by mouth every 8 (eight) hours as needed (nausea and vomiting).  Qty: 12 tablet, Refills: 0      lisinopril (PRINIVIL,ZESTRIL) 5 MG tablet Take 1 tablet by mouth only as needed for high blood pressure      potassium chloride SA (K-DUR,KLOR-CON) 20 MEQ tablet Take 1 mEq by mouth 2 (two) times daily.             I certify that this patient is confined to her home and needs intermittent skilled nursing care, physical therapy and occupational therapy.

## 2017-10-25 NOTE — HOSPITAL COURSE
* Decompensated hepatic cirrhosis     PERKINS and Alcoholic with ascites  MELD-Na score: 29 at 10/25/2017  6:07 AM  MELD score: 23 at 10/25/2017  6:07 AM  Calculated from:  Serum Creatinine: 0.9 mg/dL (Rounded to 1) at 10/25/2017  6:07 AM  Serum Sodium: 127 mmol/L at 10/25/2017  6:07 AM  Total Bilirubin: 9.8 mg/dL at 10/25/2017  6:07 AM  INR(ratio): 2.0 at 10/25/2017  6:07 AM  Age: 52 years  - hepatology consult  - unclear what caused recent decompensation, however based on elevated WBC and LFT pattern, it seems like patient may still be drinking; had a tap in the ED, negative for SBP  -  PETH positive; denying alcohol use still  - addiction psych consult, they state high risk will re-evaluate after IOP and AA meetings  - patient has been approved for inpatient liver transplant evaluation   - IR paracentesis done on 10/20 with 4 L removed   - Dobutamine stress test is negative for ischemia;  - patient declined for liver transplant due to poor active alcohol use until hospitalization and poor insight into her alcohol abuse;           HCC (hepatocellular carcinoma)     - as seen on CT triple phase; 1 cm focus on inferior hepatic lobe;           Hepatic encephalopathy     - improved with lactulose; add rifaximin           Severe protein-calorie malnutrition     - has hardly had any appetite in weeks; PAB; dietary and ensure          Alcoholic hepatitis with ascites     - s/p liver biopsy on 10/24; started on prednisone 40 mg daily today; follow up results; will send out on prednisone 40 mg daily          Elevated AFP     - no clear mass seen on U/S; possible PVT; will get CT abdomen pelvis triple phase for further evaluation           Hyponatremia     - fluid restriction to 1200 cc          Hypomagnesemia     - replace           Hypokalemia     - replace           Leucocytosis     - no current evidence of infection; U/A and CXR negative; SBP negative; blood cultures negative  - likely due to alcoholic hepatitis            Coagulopathy     - started on vitamin K for 3 days           Ascites     - see above           NAFLD (nonalcoholic fatty liver disease)     - see decompensated cirrhosis           Anemia     Of chronic disease, monitor

## 2017-10-25 NOTE — DISCHARGE SUMMARY
[facility] Hospital Medicine  Discharge Summary      Patient Name: Marcei Bazan  MRN: [id number]  Admission Date: 10/19/2017  Hospital Length of Stay: 6 days  Discharge Date and Time: 10/25/17  Attending Physician: No att. providers found   Discharging Provider: Burton Vora MD  Primary Care Provider: Milton Ferrer, [facility] Medicine Team: [facility] MED A Burton Vora MD    HPI:   51 y/o AAF with PMHx of HTN and cirrhosis presents to the ED for admission. She was seen in the liver transplant clinic for eval for liver transplant. She was noted to have hypokalemia and leukocytosis so was sent to the ED for further evaluation. She reports fatigue, decreased appetite, chills, abdominal distention, abdominal pain, constipation, difficulty urinating, lightheadedness. She has PRN paracentesis (last in early October). She reports that she is taking lactulose. She is on potassium supplementation but is unable to tolerate liquid potassium (causes emesis), she was changed to K tablets today (has not yet filled this). She denies fever, chest pain, diarrhea, dysuria, LE edema. She states heavy use of alcohol in the past, but denies any recent use in past six months and denies tobacco or drug use.      * No surgery found *      Indwelling Lines/Drains at time of discharge:   Lines/Drains/Airways          No matching active lines, drains, or airways        Hospital Course:   * Decompensated hepatic cirrhosis     PERKINS and Alcoholic with ascites  MELD-Na score: 29 at 10/25/2017  6:07 AM  MELD score: 23 at 10/25/2017  6:07 AM  Calculated from:  Serum Creatinine: 0.9 mg/dL (Rounded to 1) at 10/25/2017  6:07 AM  Serum Sodium: 127 mmol/L at 10/25/2017  6:07 AM  Total Bilirubin: 9.8 mg/dL at 10/25/2017  6:07 AM  INR(ratio): 2.0 at 10/25/2017  6:07 AM  Age: 52 years  - hepatology consult  - unclear what caused recent decompensation, however based on elevated WBC and LFT pattern, it seems like patient  may still be drinking; had a tap in the ED, negative for SBP  -  PETH positive; denying alcohol use still  - addiction psych consult, they state high risk will re-evaluate after IOP and AA meetings  - patient has been approved for inpatient liver transplant evaluation   - IR paracentesis done on 10/20 with 4 L removed   - Dobutamine stress test is negative for ischemia;  - patient declined for liver transplant due to poor active alcohol use until hospitalization and poor insight into her alcohol abuse;           HCC (hepatocellular carcinoma)     - as seen on CT triple phase; 1 cm focus on inferior hepatic lobe;           Hepatic encephalopathy     - improved with lactulose; add rifaximin           Severe protein-calorie malnutrition     - has hardly had any appetite in weeks; PAB; dietary and ensure          Alcoholic hepatitis with ascites     - s/p liver biopsy on 10/24; started on prednisone 40 mg daily today; follow up results; will send out on prednisone 40 mg daily          Elevated AFP     - no clear mass seen on U/S; possible PVT; will get CT abdomen pelvis triple phase for further evaluation           Hyponatremia     - fluid restriction to 1200 cc          Hypomagnesemia     - replace           Hypokalemia     - replace           Leucocytosis     - no current evidence of infection; U/A and CXR negative; SBP negative; blood cultures negative  - likely due to alcoholic hepatitis           Coagulopathy     - started on vitamin K for 3 days           Ascites     - see above           NAFLD (nonalcoholic fatty liver disease)     - see decompensated cirrhosis           Anemia     Of chronic disease, monitor            Consults:   Consults         Status Ordering Provider     Inpatient consult to Dietary  Once     Provider:  (Not yet assigned)    Completed AMANDA MAHONEY     Inpatient consult to Gynecology  Once     Provider:  (Not yet assigned)    Completed AMANDA MAHONEY     Inpatient consult to Hepatology   Once     Provider:  (Not yet assigned)    Completed ANNABELLE ENRIQUE     Inpatient consult to Infectious Diseases  Once     Provider:  (Not yet assigned)    Completed AMANDA MAHONEY     Inpatient consult to Liver Transplant  Once     Provider:  (Not yet assigned)    MARTHA Fountain     Inpatient consult to Psychiatry  Once     Provider:  (Not yet assigned)    Completed AMANDA MAHONEY     Inpatient consult to Psychiatry  Once     Provider:  (Not yet assigned)    Completed AMANDA MAHONEY              Pending Diagnostic Studies:     Procedure Component Value Units Date/Time    2D echo with color flow doppler [[id number]]     Order Status:  Sent Lab Status:  No result     IR Transjugular Liver Biopsy [[id number]] Updated:  10/23/17 1726    Order Status:  Sent Lab Status:  In process     Strongyloides IgG Antibodies [[id number]] Collected:  10/23/17 0918    Order Status:  Sent Lab Status:  In process Updated:  10/23/17 0926    Specimen:  Blood from Blood     Varicella zoster antibody, IgG [[id number]] Collected:  10/23/17 0918    Order Status:  Sent Lab Status:  In process Updated:  10/23/17 0926    Specimen:  Blood from Blood         Final Active Diagnoses:    Diagnosis Date Noted POA    PRINCIPAL PROBLEM:  Decompensated hepatic cirrhosis [K72.90] 07/05/2016 Yes    HCC (hepatocellular carcinoma) [C22.0] 10/21/2017 Yes    Hepatic encephalopathy [K72.90] 10/20/2017 Yes    Leucocytosis [D72.829] 10/19/2017 Yes    Hypokalemia [E87.6] 10/19/2017 Yes    Hypomagnesemia [E83.42] 10/19/2017 Yes    Hyponatremia [E87.1] 10/19/2017 Yes    Serum ammonia increased [E72.20] 10/19/2017 Yes    Elevated AFP [R77.2] 10/19/2017 Yes    Elevated lactic acid level [R79.89] 10/19/2017 Yes    Alcoholic hepatitis with ascites [K70.11] 10/19/2017 Yes    Severe protein-calorie malnutrition [E43] 10/19/2017 Yes    Coagulopathy [D68.9] 09/13/2017 Yes    Ascites [R18.8] 09/11/2017 Yes    Jaundice [R17] 09/11/2017 Yes     "Obesity (BMI 30-39.9) [E66.9] 01/05/2016 Yes    Anemia [D64.9] 08/27/2015 Yes    NAFLD (nonalcoholic fatty liver disease) [K76.0] 08/27/2015 Yes    Multinodular thyroid [E04.2] 08/27/2015 Yes      Problems Resolved During this Admission:    Diagnosis Date Noted Date Resolved POA      No new Assessment & Plan notes have been filed under this hospital service since the last note was generated.  Service: Hospital Medicine      Discharged Condition: fair    Disposition: Home or Self Care    Follow Up:  Follow-up Information     Joselin Souza MD On 11/8/2017.    Specialties:  Transplant, Hepatology  Why:  4:00pm  Hospital discharge follow up/ Please call Scarlett at Dr. Souza's office when paracentesis needed so can be scheduled without going to ER   Contact information:  [address]  [place] [address]  [phone]             [facility].    Specialty:  Home Health Services  Why:  Home Health  Contact information:  [address]  [place] [address]  [phone]                 Patient Instructions:     WALKER FOR HOME USE   Order Specific Question Answer Comments   Type of Walker: Adult (5'4"-6'6")    With wheels? Yes    Height: 5' 5" (1.651 m)    Weight: 71 kg (156 lb 8.4 oz)    Length of need (1-99 months): 99    Does patient have medical equipment at home? none    Please check all that apply: Patient's condition impairs ambulation.      Diet general       Medications:  Reconciled Home Medications:   Discharge Medication List as of 10/25/2017 11:31 AM      START taking these medications    Details   furosemide (LASIX) 40 MG tablet Take 1 tablet (40 mg total) by mouth once daily., Starting Wed 10/25/2017, Until Thu 10/25/2018, Normal      predniSONE (DELTASONE) 20 MG tablet Take 2 tablets (40 mg total) by mouth once daily., Starting Thu 10/26/2017, Until Sat 11/25/2017, Normal         CONTINUE these medications which have CHANGED    Details   lactulose " (CHRONULAC) 20 gram/30 mL Soln Take 45 mLs (30 g total) by mouth 4 (four) times daily., Starting Wed 10/25/2017, Until Sat 11/4/2017, Normal      spironolactone (ALDACTONE) 100 MG tablet Take 3 tablets (300 mg total) by mouth once daily., Starting Thu 10/26/2017, Until Fri 10/26/2018, Normal         CONTINUE these medications which have NOT CHANGED    Details   cetirizine (ZYRTEC) 10 MG tablet Take 10 mg by mouth once daily., Starting Thu 2/9/2017, Until Fri 2/9/2018, Historical Med      lisinopril (PRINIVIL,ZESTRIL) 5 MG tablet Take 1 tablet by mouth only as needed for high blood pressure, Starting Thu 6/8/2017, Historical Med      omeprazole (PRILOSEC) 40 MG capsule Take 40 mg by mouth once daily., Starting Wed 3/8/2017, Historical Med      ondansetron (ZOFRAN-ODT) 4 MG TbDL Take 1 tablet (4 mg total) by mouth every 8 (eight) hours as needed (nausea and vomiting)., Starting Fri 9/15/2017, Normal      potassium chloride SA (K-DUR,KLOR-CON) 20 MEQ tablet Take 1 mEq by mouth 2 (two) times daily., Starting Thu 10/12/2017, Historical Med           Time spent on the discharge of patient: 35 minutes      Burotn Vora MD  Department of Hospital Medicine  [facility]

## 2017-10-25 NOTE — CONSULTS
Infectious Disease Pre- liver Transplant Evaluation Note:      Consulting Physician:   Reason for Consult: Pre- liver Transplant evaluation    Assessment, Plan and Counseling:  Transplant Candidacy: Based on available information, there are no unusual risks of infection or identified significant barriers to transplantation from an infectious disease standpoint at this time subject to the following.    - Serologies still in process:     strongyloides, pending, if positive please reconsult ID or have pt be seen in ID clinic.     Immunizations:  The patient's immunization history and serologies were reviewed.  Based on the patients immunization history and serologies, immunizations were ordered:      Vaccines today:  Influenza  Prevnar  Pneumovax Rx given due 8 weeks after prevnar    The patient was encouraged to contact us about any problems that may develop after immunization and possible side effects were reviewed.       Counseling: discussed with the patient the risk for increased susceptibility to infections following transplantation including increased risk for infection right after transplant and if rejection should occur.  The patients has been counseled on the importance of vaccinations including but not limited to a yearly flu vaccine. Specific guidance has been provided to the patient regarding the patients occupation, hobbies and activities to avoid future infectious complications including but not limited to avoiding undercooked meats and seafood, proper hygiene, and contact with animals.    Final determination of transplant candidacy will be made once evaluation is complete and reviewed by the liver Transplant Selection Committee.    Thank you,  Tayo Koehler PA-C  [facility] [phone]      Problem List:  Active Hospital Problems    Diagnosis  POA    *Decompensated hepatic cirrhosis [K72.90]  Yes    HCC (hepatocellular carcinoma) [C22.0]  Yes    Hepatic encephalopathy [K72.90]  Yes    Leucocytosis  [D72.829]  Yes    Hypokalemia [E87.6]  Yes    Hypomagnesemia [E83.42]  Yes    Hyponatremia [E87.1]  Yes    Serum ammonia increased [E72.20]  Yes    Elevated AFP [R77.2]  Yes    Elevated lactic acid level [R79.89]  Yes    Alcoholic hepatitis with ascites [K70.11]  Yes    Severe protein-calorie malnutrition [E43]  Yes    Coagulopathy [D68.9]  Yes    Ascites [R18.8]  Yes    Jaundice [R17]  Yes    Obesity (BMI 30-39.9) [E66.9]  Yes    Anemia [D64.9]  Yes    NAFLD (nonalcoholic fatty liver disease) [K76.0]  Yes     [facility]:   [facility]      Multinodular thyroid [E04.2]  Yes     Overview:   9/24/2014 Thyroid US  Repeat 9/11/2015  Biopsy 10/28/14        Resolved Hospital Problems    Diagnosis Date Resolved POA   No resolved problems to display.       Chief Complaint: LIver Transplant Evaluation    History of Present Illness:  Ms. Bazan is a 52 y.o.-year-old female with advanced Liver disease. Infectious disease is consulted for Pre- liver transplant evaluation.  No recent fever chills or infections      none      The infectious disease pre-transplant questionnaire was reviewed.   Patient denies recent fevers, chills, infective illnesses.      No history of diabetes.  No current or long term steroid use.   Denies splenectomy    Denies history of chronic, recurring infections of sinuses, throat, bladder/kidneys, intestines, skin, reproductive organs, teeth or gums.     Patient has/has not had chickenpox.  No shingles.   Denies history of syphilis, gonorrhea, other STDs/viral hepatitis/ or other infective illnesses.     Denies known exposure to TB  No history of residence in coccidioides endemic areas  No foreign travel    Pet/Animal exposures:  None  Food: Denies eating raw/undercooked food  Occupational: n/a   Hobbies: No risky hobbies identified    Immunization History:  Usual childhood vaccines  Flu today   Tdap 2013  Hepatitis A IgG Positive  Hepatitis B has immunity  [id number]  Shingles  none    Serologies:  Viral pathogens  -HIV and HTLV: Negative.  -Hepatitis C: Negative.  -Hepatitis B: positive for immunity.  -Hepatitis A: immune   -Varicella zoster: pending   -CMV serology: Negative serology. High risk for invasive disease if he receives a CMV positive donor organ.  -EBV: Positive for EBV     Bacterial pathogens  -Tetanus: up to date   -Pneumococcus: PPSV23, PCV13  -Tuberculosis: Quantiferon gold pending   -Syphilis: RPR Negative     Fungal pathogens  -Coccidioides: No report of travel to endemic area.     Parasitic pathogens  -Strongyloides: Negative serology.  -Toxoplasmosis: Negative      Past Medical History:  Past Medical History:   Diagnosis Date    Hypertension     Liver cirrhosis        Past Surgical History:  Past Surgical History:   Procedure Laterality Date    BREAST SURGERY      CHOLECYSTECTOMY      HYSTERECTOMY         Family History:  Family History   Problem Relation Age of Onset    Hypertension Mother     Hypertension Father     Diabetes Father     Diabetes Sister     Depression Maternal Grandfather        Social History:  Social History     Social History    Marital status: Single     Spouse name: N/A    Number of children: N/A    Years of education: N/A     Occupational History    Not on file.     Social History Main Topics    Smoking status: Never Smoker    Smokeless tobacco: Never Used    Alcohol use Yes      Comment: occasionally    Drug use: No    Sexual activity: Not Currently     Other Topics Concern    Not on file     Social History Narrative    No narrative on file       Allergies:  Review of patient's allergies indicates:   Allergen Reactions    Ferrous sulfate Other (See Comments)     Patient states the pill makes her sick. She stated she would rather have a shot       Immunizations:  Immunization History   Administered Date(s) Administered    Influenza - Quadrivalent - PF (3 years & older) 10/17/2013, 12/23/2014, 09/28/2015, 11/03/2016,  10/25/2017    Pneumococcal Conjugate - 13 Valent 10/25/2017    Pneumococcal Polysaccharide - 23 Valent 04/02/2011    Tdap 01/10/2013        Review of Symptoms:  Review of Systems   Constitution: Negative for chills, decreased appetite, fever, weakness, malaise/fatigue, night sweats, weight gain and weight loss.   HENT: Negative for congestion, ear pain, hearing loss, hoarse voice, sore throat and tinnitus.    Eyes: Negative for blurred vision, pain, vision loss in left eye, vision loss in right eye and visual disturbance.   Cardiovascular: Negative for chest pain, dyspnea on exertion, leg swelling and palpitations.   Respiratory: Negative for cough, shortness of breath, sputum production and wheezing.    Skin: Negative for dry skin, itching, rash and suspicious lesions.   Musculoskeletal: Negative for back pain, joint pain, myalgias and neck pain.   Gastrointestinal: Positive for bloating. Negative for abdominal pain, constipation, diarrhea, heartburn, nausea and vomiting.   Genitourinary: Negative for dysuria, flank pain, frequency, hematuria, hesitancy and urgency.   Neurological: Negative for dizziness, headaches, numbness and paresthesias.   Psychiatric/Behavioral: Negative for depression and memory loss. The patient does not have insomnia and is not nervous/anxious.      Pertinent Medications:  Antibiotics:   Antibiotics     None          Physical Exam:  VS (24h):   Vitals:    10/25/17 1204   BP: 127/60   Pulse: 99   Resp: 18   Temp: 99 °F (37.2 °C)     Temp:  [97.6 °F (36.4 °C)-99 °F (37.2 °C)]     Physical Exam   Constitutional: She appears well-developed and well-nourished. No distress.   HENT:   Head: Normocephalic.   Eyes: Scleral icterus is present.   Cardiovascular: Normal rate, regular rhythm and normal heart sounds.    No murmur heard.  Pulmonary/Chest: Effort normal. No respiratory distress. She has no wheezes.   Abdominal: Soft. She exhibits distension. There is no tenderness. There is no  guarding.   Musculoskeletal: Normal range of motion. She exhibits no edema or deformity.   Neurological: She is alert.   Skin: Skin is warm. She is not diaphoretic.   Psychiatric: She has a normal mood and affect.   Nursing note and vitals reviewed.      Labs:  CBC:   Lab Results   Component Value Date    WBC 14.93 (H) 10/25/2017    WBC 17.97 (H) 10/24/2017    WBC 14.54 (H) 10/23/2017    WBC 10.94 10/22/2017    WBC 11.67 10/21/2017    HGB 9.0 (L) 10/25/2017    HCT 25.1 (L) 10/25/2017    MCV 75 (L) 10/25/2017     (L) 10/25/2017       BMP:     Recent Labs  Lab 10/25/17  0607   *   *   K 3.7   CL 91*   CO2 25   BUN 9   CREATININE 0.9   CALCIUM 9.3   MG 1.3*       LFT:   Lab Results   Component Value Date    ALT 20 10/25/2017    AST 44 (H) 10/25/2017     (H) 10/20/2017    ALKPHOS 128 10/25/2017    BILITOT 9.8 (H) 10/25/2017       RPR:   Lab Results   Component Value Date    RPR Non-reactive 10/20/2017        Quantiferon Gold Tb: No results found for: QUANTIFERON    Hepatitis Serologies:   Lab Results   Component Value Date    HEPBCAB Negative 10/20/2017    HEPCAB Negative 10/20/2017        Microbiology x 7d:   Microbiology Results (last 7 days)     Procedure Component Value Units Date/Time    Culture, Body Fluid - Bactec [[id number]] Collected:  10/19/17 1423    Order Status:  Completed Specimen:  Body Fluid from Peritoneal Fluid Updated:  10/24/17 2212     Body Fluid Culture, Sterile No growth after 5 days.    Blood Culture #1 **CANNOT BE ORDERED STAT** [[id number]] Collected:  10/19/17 1229    Order Status:  Completed Specimen:  Blood from Peripheral, Antecubital, Right Updated:  10/24/17 1612     Blood Culture, Routine No growth after 5 days.    Blood Culture #2 **CANNOT BE ORDERED STAT** [[id number]] Collected:  10/19/17 1343    Order Status:  Completed Specimen:  Blood from Peripheral, Antecubital, Left Updated:  10/24/17 1612     Blood Culture, Routine No growth after 5 days.    Aerobic  culture [[id number]] Collected:  10/19/17 1423    Order Status:  Completed Specimen:  Ascites from Body Fluid Updated:  10/23/17 1135     Aerobic Bacterial Culture No growth    Narrative:       ADD-ON CXAER #[id number] PER ANNABELLE ENRIQUE MD 15:36  10/19/2017     Urine culture [[id number]] Collected:  10/20/17 1954    Order Status:  Completed Specimen:  Urine from Urine, Clean Catch Updated:  10/21/17 2258     Urine Culture, Routine No growth    Culture, Anaerobic [[id number]] Collected:  10/19/17 1423    Order Status:  Completed Specimen:  Ascites Updated:  10/20/17 0806     Anaerobic Culture Culture in progress    Narrative:       ADD-ON CXANA #[id number] PER ANNABELLE ENRIQUE MD 15:39  10/19/2017     Gram stain [[id number]] Collected:  10/19/17 1423    Order Status:  Completed Specimen:  Ascites from Peritoneal Fluid Updated:  10/19/17 1631     Gram Stain Result Cytospin indicates:      Few WBC's      No organisms seen    Narrative:       ADD-ON GRAM #[id number] PER JAMES RM MD 15:44  10/19/2017     Gram stain [[id number]]     Order Status:  Completed Specimen:  Body Fluid from Peritoneal Fluid     Anaerobic culture [[id number]]     Order Status:  Completed Specimen:  Ascites     Aerobic culture [[id number]]     Order Status:  Completed Specimen:  Ascites     Gram stain [[id number]]     Order Status:  Canceled Specimen:  Body Fluid from Peritoneal Fluid           Imaging:  chest xray, abdominal/pelvic CT scan, chest CT scan and MRI       Assessment/Plan - Please see top of page

## 2017-10-25 NOTE — CONSULTS
"Patient seen and examined this AM, chart and labs reviewed. Re-consulted due to positive Peth at 51 which is consistent with 1-2 drinks daily over the last 3-4 week. Please see initial consult note from 10-20-17 for full history which is unchanged.     Patient denies drinking despite positive Peth, upon further questioning patient stated, "I may have drank and forgot, or it could be rum cake." At this time patient is agreeable to residential treatment for alcohol use disorder.     Dx:  Alcohol use disorder severe    Plan:  -Pt remains high risk from addiction standpoint, pt is actively drinking.   -Recommend re-evaluation after completion of residential treatment, 6 months of documented sobriety with serial Peth tests and continued AA involvement.     Rashid Montes MD    "

## 2017-10-25 NOTE — COMMITTEE REVIEW
Marcie Bazan's case presented to selection committee.  Patient has been declined for liver transplant due to continued alcohol abuse a lack of solid caregiver plan.  I was present at the committee meeting and attest to the decision of the committee.    Eleuterio Childress  10/25/2017

## 2017-10-25 NOTE — SUBJECTIVE & OBJECTIVE
Interval History: patient is doing well today; patient has been declined for liver transplant due to poor insight to her alcoholism; will need IOP therapy prior to re-consideration      Review of Systems   Constitutional: Negative for chills and fever.   HENT: Negative for rhinorrhea and sore throat.    Eyes: Negative for pain and discharge.   Respiratory: Negative for cough and shortness of breath.    Cardiovascular: Negative for chest pain and leg swelling.   Gastrointestinal: Positive for abdominal distention. Negative for abdominal pain, blood in stool, nausea and vomiting.   Endocrine: Negative for cold intolerance and heat intolerance.   Genitourinary: Negative for dysuria and flank pain.   Neurological: Negative for dizziness and numbness.   Psychiatric/Behavioral: Negative for behavioral problems and confusion.     Objective:     Vital Signs (Most Recent):  Temp: 99 °F (37.2 °C) (10/25/17 1204)  Pulse: 99 (10/25/17 1204)  Resp: 18 (10/25/17 1204)  BP: 127/60 (10/25/17 1204)  SpO2: 96 % (10/25/17 1204) Vital Signs (24h Range):  Temp:  [97.6 °F (36.4 °C)-99 °F (37.2 °C)] 99 °F (37.2 °C)  Pulse:  [] 99  Resp:  [16-18] 18  SpO2:  [93 %-100 %] 96 %  BP: (117-140)/(60-73) 127/60     Weight: 71 kg (156 lb 8.4 oz)  Body mass index is 26.05 kg/m².    Intake/Output Summary (Last 24 hours) at 10/25/17 1446  Last data filed at 10/25/17 0814   Gross per 24 hour   Intake              750 ml   Output             3500 ml   Net            -2750 ml      Physical Exam   Constitutional: She is oriented to person, place, and time. She appears well-developed and well-nourished.   HENT:   Head: Normocephalic and atraumatic.   Eyes: Conjunctivae are normal. Pupils are equal, round, and reactive to light. Scleral icterus is present.   Neck: Normal range of motion. No thyromegaly present.   Cardiovascular: Normal rate, regular rhythm and normal heart sounds.    Pulmonary/Chest: Effort normal and breath sounds normal.    Abdominal: Soft. She exhibits distension. There is no tenderness.   Musculoskeletal: Normal range of motion. She exhibits no edema.   Neurological: She is alert and oriented to person, place, and time.   Skin: No erythema. No pallor.       MELD-Na score: 29 at 10/25/2017  6:07 AM  MELD score: 23 at 10/25/2017  6:07 AM  Calculated from:  Serum Creatinine: 0.9 mg/dL (Rounded to 1) at 10/25/2017  6:07 AM  Serum Sodium: 127 mmol/L at 10/25/2017  6:07 AM  Total Bilirubin: 9.8 mg/dL at 10/25/2017  6:07 AM  INR(ratio): 2.0 at 10/25/2017  6:07 AM  Age: 52 years    Significant Labs:  CBC:    Recent Labs  Lab 10/24/17  0428 10/25/17  0607   WBC 17.97* 14.93*   HGB 11.4* 9.0*   HCT 31.9* 25.1*   * 107*     CMP:    Recent Labs  Lab 10/24/17  0427 10/25/17  0607   * 127*   K 4.1 3.7   CL 89* 91*   CO2 25 25   GLU 94 118*   BUN 6 9   CREATININE 0.8 0.9   CALCIUM 8.8 9.3   PROT 6.7 6.6   ALBUMIN 2.2* 3.1*   BILITOT 11.3* 9.8*   ALKPHOS 160* 128   AST 66* 44*   ALT 27 20   ANIONGAP 12 11   EGFRNONAA >60.0 >60.0     PTINR:    Recent Labs  Lab 10/24/17  0428 10/25/17  0607   INR 1.9* 2.0*       Significant Procedures:   Dobutamine Stress Test with Color Flow:   Results for orders placed or performed during the hospital encounter of 10/19/17   Dobutamine Stress Test w/ Color Flow   Result Value Ref Range    EF 73 55 - 65    Diastolic Dysfunction No     Est. PA Systolic Pressure 21.32     Tricuspid Valve Regurgitation TRIVIAL

## 2017-10-25 NOTE — PROGRESS NOTES
SW returned call to pt's RUDOLPH Sally Pitcher ([phone]) who was initially calling regarding pt's caregiver plan. SW left a voicemail as RUDOLPH did not answer. SW remains available.

## 2017-10-25 NOTE — PROGRESS NOTES
[facility] Hospital Medicine  Progress Note    Patient Name: Marcie Bazan  MRN: [id number]  Patient Class: IP- Inpatient   Admission Date: 10/19/2017  Length of Stay: 6 days  Attending Physician: No att. providers found  Primary Care Provider: Milton Ferrer, [facility] Medicine Team: [facility] [place] Burton Vora MD    Subjective:     Principal Problem:Decompensated hepatic cirrhosis    HPI:  53 y/o AAF with PMHx of HTN and cirrhosis presents to the ED for admission. She was seen in the liver transplant clinic for eval for liver transplant. She was noted to have hypokalemia and leukocytosis so was sent to the ED for further evaluation. She reports fatigue, decreased appetite, chills, abdominal distention, abdominal pain, constipation, difficulty urinating, lightheadedness. She has PRN paracentesis (last in early October). She reports that she is taking lactulose. She is on potassium supplementation but is unable to tolerate liquid potassium (causes emesis), she was changed to K tablets today (has not yet filled this). She denies fever, chest pain, diarrhea, dysuria, LE edema. She states heavy use of alcohol in the past, but denies any recent use in past six months and denies tobacco or drug use.      Hospital Course:  No notes on file    Interval History: patient is doing well today; patient has been declined for liver transplant due to poor insight to her alcoholism; will need IOP therapy prior to re-consideration      Review of Systems   Constitutional: Negative for chills and fever.   HENT: Negative for rhinorrhea and sore throat.    Eyes: Negative for pain and discharge.   Respiratory: Negative for cough and shortness of breath.    Cardiovascular: Negative for chest pain and leg swelling.   Gastrointestinal: Positive for abdominal distention. Negative for abdominal pain, blood in stool, nausea and vomiting.   Endocrine: Negative for cold intolerance and heat intolerance.    Genitourinary: Negative for dysuria and flank pain.   Neurological: Negative for dizziness and numbness.   Psychiatric/Behavioral: Negative for behavioral problems and confusion.     Objective:     Vital Signs (Most Recent):  Temp: 99 °F (37.2 °C) (10/25/17 1204)  Pulse: 99 (10/25/17 1204)  Resp: 18 (10/25/17 1204)  BP: 127/60 (10/25/17 1204)  SpO2: 96 % (10/25/17 1204) Vital Signs (24h Range):  Temp:  [97.6 °F (36.4 °C)-99 °F (37.2 °C)] 99 °F (37.2 °C)  Pulse:  [] 99  Resp:  [16-18] 18  SpO2:  [93 %-100 %] 96 %  BP: (117-140)/(60-73) 127/60     Weight: 71 kg (156 lb 8.4 oz)  Body mass index is 26.05 kg/m².    Intake/Output Summary (Last 24 hours) at 10/25/17 1446  Last data filed at 10/25/17 0814   Gross per 24 hour   Intake              750 ml   Output             3500 ml   Net            -2750 ml      Physical Exam   Constitutional: She is oriented to person, place, and time. She appears well-developed and well-nourished.   HENT:   Head: Normocephalic and atraumatic.   Eyes: Conjunctivae are normal. Pupils are equal, round, and reactive to light. Scleral icterus is present.   Neck: Normal range of motion. No thyromegaly present.   Cardiovascular: Normal rate, regular rhythm and normal heart sounds.    Pulmonary/Chest: Effort normal and breath sounds normal.   Abdominal: Soft. She exhibits distension. There is no tenderness.   Musculoskeletal: Normal range of motion. She exhibits no edema.   Neurological: She is alert and oriented to person, place, and time.   Skin: No erythema. No pallor.       MELD-Na score: 29 at 10/25/2017  6:07 AM  MELD score: 23 at 10/25/2017  6:07 AM  Calculated from:  Serum Creatinine: 0.9 mg/dL (Rounded to 1) at 10/25/2017  6:07 AM  Serum Sodium: 127 mmol/L at 10/25/2017  6:07 AM  Total Bilirubin: 9.8 mg/dL at 10/25/2017  6:07 AM  INR(ratio): 2.0 at 10/25/2017  6:07 AM  Age: 52 years    Significant Labs:  CBC:    Recent Labs  Lab 10/24/17  0428 10/25/17  0607   WBC 17.97* 14.93*    HGB 11.4* 9.0*   HCT 31.9* 25.1*   * 107*     CMP:    Recent Labs  Lab 10/24/17  0427 10/25/17  0607   * 127*   K 4.1 3.7   CL 89* 91*   CO2 25 25   GLU 94 118*   BUN 6 9   CREATININE 0.8 0.9   CALCIUM 8.8 9.3   PROT 6.7 6.6   ALBUMIN 2.2* 3.1*   BILITOT 11.3* 9.8*   ALKPHOS 160* 128   AST 66* 44*   ALT 27 20   ANIONGAP 12 11   EGFRNONAA >60.0 >60.0     PTINR:    Recent Labs  Lab 10/24/17  0428 10/25/17  0607   INR 1.9* 2.0*       Significant Procedures:   Dobutamine Stress Test with Color Flow:   Results for orders placed or performed during the hospital encounter of 10/19/17   Dobutamine Stress Test w/ Color Flow   Result Value Ref Range    EF 73 55 - 65    Diastolic Dysfunction No     Est. PA Systolic Pressure 21.32     Tricuspid Valve Regurgitation TRIVIAL            Assessment/Plan:      * Decompensated hepatic cirrhosis    PERKINS and Alcoholic with ascites  MELD-Na score: 29 at 10/25/2017  6:07 AM  MELD score: 23 at 10/25/2017  6:07 AM  Calculated from:  Serum Creatinine: 0.9 mg/dL (Rounded to 1) at 10/25/2017  6:07 AM  Serum Sodium: 127 mmol/L at 10/25/2017  6:07 AM  Total Bilirubin: 9.8 mg/dL at 10/25/2017  6:07 AM  INR(ratio): 2.0 at 10/25/2017  6:07 AM  Age: 52 years  - hepatology consult  - unclear what caused recent decompensation, however based on elevated WBC and LFT pattern, it seems like patient may still be drinking; had a tap in the ED, negative for SBP  -  PETH positive; denying alcohol use still  - addiction psych consult, they state high risk will re-evaluate after IOP and AA meetings  - patient has been approved for inpatient liver transplant evaluation   - IR paracentesis done on 10/20 with 4 L removed   - Dobutamine stress test is negative for ischemia;  - patient declined for liver transplant due to poor active alcohol use until hospitalization and poor insight into her alcohol abuse;           HCC (hepatocellular carcinoma)    - as seen on CT triple phase; 1 cm focus on  inferior hepatic lobe;           Hepatic encephalopathy    - improved with lactulose; add rifaximin           Severe protein-calorie malnutrition    - has hardly had any appetite in weeks; PAB; dietary and ensure          Alcoholic hepatitis with ascites    - s/p liver biopsy on 10/24; started on prednisone 40 mg daily today; follow up results; will send out on prednisone 40 mg daily          Elevated AFP    - no clear mass seen on U/S; possible PVT; will get CT abdomen pelvis triple phase for further evaluation           Hyponatremia    - fluid restriction to 1200 cc          Hypomagnesemia    - replace           Hypokalemia    - replace           Leucocytosis    - no current evidence of infection; U/A and CXR negative; SBP negative; blood cultures negative  - likely due to alcoholic hepatitis           Coagulopathy    - started on vitamin K for 3 days           Ascites    - see above           NAFLD (nonalcoholic fatty liver disease)    - see decompensated cirrhosis           Anemia    Of chronic disease, monitor             VTE Risk Mitigation         Ordered     Medium Risk of VTE  Once      10/19/17 1631     Place sequential compression device  Until discontinued      10/19/17 1510              Burton Vora MD  Department of Hospital Medicine   [facility]

## 2017-10-25 NOTE — PLAN OF CARE
Home health referral canceled to [facility] and now sent to [facility] via [facility].   Sw awaiting acceptance of referral

## 2017-10-25 NOTE — PLAN OF CARE
Problem: Patient Care Overview  Goal: Plan of Care Review  Outcome: Ongoing (interventions implemented as appropriate)  No acute events this shift. AVSS on RA. Denies pain. NPO since 0000 for EGD but did eat entire meal tray at 2350 when RN entered room, pt educated on risks of aspiration & verbalizes understanding. Para site intact.     Problem: Liver Failure, Acute/Chronic (Adult)  Intervention: Promote Neurologic Homeostasis  AOx4. 2 BMs overnight - lactulose administered per orders. Neuro checks completed Q4hrs. Strict I&O, daily wts obtained. No bleeding or pruritis. No abd pain.

## 2017-10-25 NOTE — TELEPHONE ENCOUNTER
Patient for discharge today.  Contacted Dr. Souza's [place] office.  Patient will have HH for labs.  Obtained [facility] fax # [phone] for results to be faxed for Harley review.  Also scheduled Follow up appt and patient should call when feels like she needs a Para. Discussed with LES team.

## 2017-10-25 NOTE — ASSESSMENT & PLAN NOTE
PERKINS and Alcoholic with ascites  MELD-Na score: 29 at 10/25/2017  6:07 AM  MELD score: 23 at 10/25/2017  6:07 AM  Calculated from:  Serum Creatinine: 0.9 mg/dL (Rounded to 1) at 10/25/2017  6:07 AM  Serum Sodium: 127 mmol/L at 10/25/2017  6:07 AM  Total Bilirubin: 9.8 mg/dL at 10/25/2017  6:07 AM  INR(ratio): 2.0 at 10/25/2017  6:07 AM  Age: 52 years  - hepatology consult  - unclear what caused recent decompensation, however based on elevated WBC and LFT pattern, it seems like patient may still be drinking; had a tap in the ED, negative for SBP  -  PETH positive; denying alcohol use still  - addiction psych consult, they state high risk will re-evaluate after IOP and AA meetings  - patient has been approved for inpatient liver transplant evaluation   - IR paracentesis done on 10/20 with 4 L removed   - Dobutamine stress test is negative for ischemia;  - patient declined for liver transplant due to poor active alcohol use until hospitalization and poor insight into her alcohol abuse;

## 2017-10-25 NOTE — PLAN OF CARE
10/25/17 1237   Final Note   Assessment Type Final Discharge Note   Discharge Disposition Home-Health   What phone number can be called within the next 1-3 days to see how you are doing after discharge? [phone]   Hospital Follow Up  Appt(s) scheduled? Yes   Discharge plans and expectations educations in teach back method with documentation complete? Yes   Right Care Referral Info   Post Acute Recommendation Home-care     Follow-up With  Details  Why  Contact Info   Joselin Souza MD  On 11/8/2017  4:00pm Hospital discharge follow up/ Please call Scarlett at Dr. Souza's office when paracentesis needed so can be scheduled without going to ER   [address] [facility] [address]  [phone]   [facility]  [address]  [facility] [address]  [phone]     Future Appointments  Date Time Provider Department Center   11/8/2017 4:00 PM Joselin Souza MD [facility]STR[facility]       10/26  Received update that [facility]  unable to service patient along with four additional HH referrals that denied being able to service patient.  Updated Essence with hepatology team and will schedule patient for outpatient lab follow up.  [facility] dme verifies that they have order for rolling walker and that they will set up delivery to patient's home.

## 2017-10-25 NOTE — NURSING
Pt discharged to home. Pt IVs removed. Pt received all discharge education and instruction. Pt awaiting WC to transport off unit.

## 2017-10-25 NOTE — TREATMENT PLAN
Hepatology Brief Note    Case discussed on rounds.    Will plan discharge today.  Presentation at selection today.    Will set up appt with Dr. Souza in [place]  Will need outpatient EGD   Continue prednisone 40mg on discharge x total 28 days.      Please call with any additional concerns /questions    Stalin Powers MD  Gastroenterology Fellow (PGY-V)  Pager: [phone]

## 2017-10-25 NOTE — SUBJECTIVE & OBJECTIVE
Interval History: patient underwent dobutamine stress test which was negative for ischemia; started on prednisone for alcohol hepatitis; had 3.5 L of fluid removed and then liver biopsy performed;will make NPO for possible EGD in the AM; likely discharge tomorrow vs Wednesday and IOP therapy      Review of Systems   Constitutional: Negative for chills and fever.   HENT: Negative for rhinorrhea and sore throat.    Eyes: Negative for pain and discharge.   Respiratory: Negative for cough and shortness of breath.    Cardiovascular: Negative for chest pain and leg swelling.   Gastrointestinal: Positive for abdominal distention. Negative for abdominal pain, blood in stool, nausea and vomiting.   Endocrine: Negative for cold intolerance and heat intolerance.   Genitourinary: Negative for dysuria and flank pain.   Neurological: Negative for dizziness and numbness.   Psychiatric/Behavioral: Negative for behavioral problems and confusion.     Objective:     Vital Signs (Most Recent):  Temp: 98.4 °F (36.9 °C) (10/24/17 1943)  Pulse: 102 (10/24/17 1943)  Resp: 16 (10/24/17 1943)  BP: 125/69 (10/24/17 1943)  SpO2: 96 % (10/24/17 1943) Vital Signs (24h Range):  Temp:  [97.6 °F (36.4 °C)-98.6 °F (37 °C)] 98.4 °F (36.9 °C)  Pulse:  [101-113] 102  Resp:  [16-20] 16  SpO2:  [96 %-100 %] 96 %  BP: (113-138)/(52-75) 125/69     Weight: 71.3 kg (157 lb 3 oz)  Body mass index is 26.16 kg/m².    Intake/Output Summary (Last 24 hours) at 10/24/17 1955  Last data filed at 10/24/17 1823   Gross per 24 hour   Intake              630 ml   Output             3500 ml   Net            -2870 ml      Physical Exam   Constitutional: She is oriented to person, place, and time. She appears well-developed and well-nourished.   HENT:   Head: Normocephalic and atraumatic.   Eyes: Conjunctivae are normal. Pupils are equal, round, and reactive to light. Scleral icterus is present.   Neck: Normal range of motion. No thyromegaly present.   Cardiovascular:  Normal rate, regular rhythm and normal heart sounds.    Pulmonary/Chest: Effort normal and breath sounds normal.   Abdominal: Soft. She exhibits distension. There is no tenderness.   Musculoskeletal: Normal range of motion. She exhibits no edema.   Neurological: She is alert and oriented to person, place, and time.   Skin: No erythema. No pallor.       MELD-Na score: 29 at 10/24/2017  4:28 AM  MELD score: 23 at 10/24/2017  4:28 AM  Calculated from:  Serum Creatinine: 0.8 mg/dL (Rounded to 1) at 10/24/2017  4:27 AM  Serum Sodium: 126 mmol/L at 10/24/2017  4:27 AM  Total Bilirubin: 11.3 mg/dL at 10/24/2017  4:27 AM  INR(ratio): 1.9 at 10/24/2017  4:28 AM  Age: 52 years    Significant Labs:  CBC:    Recent Labs  Lab 10/23/17  0523 10/24/17  0428   WBC 14.54* 17.97*   HGB 10.6* 11.4*   HCT 29.8* 31.9*   * 105*     CMP:    Recent Labs  Lab 10/23/17  0524 10/24/17  0427   * 126*   K 2.8* 4.1   CL 89* 89*   CO2 26 25    94   BUN 5* 6   CREATININE 0.8 0.8   CALCIUM 8.3* 8.8   PROT 6.5 6.7   ALBUMIN 2.1* 2.2*   BILITOT 10.0* 11.3*   ALKPHOS 158* 160*   AST 65* 66*   ALT 25 27   ANIONGAP 11 12   EGFRNONAA >60.0 >60.0     PTINR:    Recent Labs  Lab 10/23/17  0523 10/24/17  0428   INR 2.0* 1.9*       Significant Procedures:   Dobutamine Stress Test with Color Flow:   Results for orders placed or performed during the hospital encounter of 10/19/17   Dobutamine Stress Test w/ Color Flow   Result Value Ref Range    EF 73 55 - 65    Diastolic Dysfunction No     Est. PA Systolic Pressure 21.32     Tricuspid Valve Regurgitation TRIVIAL

## 2017-10-26 ENCOUNTER — NURSE TRIAGE (OUTPATIENT)
Dept: ADMINISTRATIVE | Facility: CLINIC | Age: 52
End: 2017-10-26

## 2017-10-26 ENCOUNTER — PATIENT OUTREACH (OUTPATIENT)
Dept: ADMINISTRATIVE | Facility: CLINIC | Age: 52
End: 2017-10-26

## 2017-10-26 LAB
BACTERIA SPEC ANAEROBE CULT: NORMAL
PHOSPHATIDYLETHANOL (PETH): 48 NG/ML

## 2017-10-26 NOTE — PATIENT INSTRUCTIONS
Discharge Instructions for Cirrhosis of the Liver  You have been diagnosed with cirrhosis of the liver. This is a long-term (chronic) problem. It occurs when liver tissue is destroyed and replaced by scar tissue. Causes of cirrhosis include:  · Infection such as viral hepatitis  · Chronic alcoholism  · The bodys immune system attacks healthy cells (autoimmune disorders)  · Obesity  · Medicine side effects  · Genetic diseases  Sometimes the exact cause is unknown. You may not have any symptoms at first. Or your symptoms may be mild. But they usually get worse. Cirrhosis is likely to occur if you have a history of long-term alcohol abuse. Cirrhosis cant be cured. But it can be treated.   Home care  Alcohol  · People with liver disease should not drink alcohol. If you stop drinking, you may feel better and live longer.  · If you are a chronic alcohol user, you will have withdrawal symptoms. Talk with your healthcare provider for more information.    · If alcohol is a problem, ask your provider about medicine that can help you quit drinking.    · Find a local Alcoholics Anonymous support group online at www.aa.org.  Diet  · Ask your provider what kind of diet you should follow. You may be asked to limit or not eat certain foods. Do not limit your protein intake.  · Weigh yourself daily and keep a weight log. If you have a sudden change in weight, call your provider.  · Cut back on salt:  ¨ Limit canned, dried, packaged, and fast foods.  ¨ Dont add salt to your food at the table.  ¨ Season foods with herbs instead of salt when you cook.  Medicines, supplements, and vaccines  · Take your medicines exactly as directed.  · Talk to your provider before taking vitamins, over-the-counter medicines, or herbal supplements. Many herbal supplements may be poisonous (toxic) to the liver.  · Avoid aspirin and other blood-thinning medicines.  · Discuss vitamin supplements and deficiencies with your provider.  · Ask your provider  about getting vaccinations for viruses that can cause liver diseases.  Follow-up care  Follow up with your healthcare provider, or as advised. You will likely have the following tests:  · Lab tests  · Blood tests for liver cancer  · Ultrasound of your liver every 6 months  · Endoscopy to check for swollen veins (varices) in your digestive tract  When to call your provider  Call your healthcare provider right away if you have any of the following:  · Fever of 100.4°F (38.0°C) or higher  · Extreme tiredness (fatigue), weakness, or lack of appetite  · Vomiting (with or without blood)  · Yellowing of your skin or eyes (jaundice)  · Itching  · Swelling in your belly or legs  · Black or tarry stools  · Skin that bruises easily  · Confusion or trouble thinking clearly   Date Last Reviewed: 8/1/2016  © 4382-1895 [employer]. [address] [place] [address], [place] [address]. All rights reserved. This information is not intended as a substitute for professional medical care. Always follow your healthcare professional's instructions.

## 2017-10-26 NOTE — PROGRESS NOTES
Please forward this important TCC information to your provider in order to maximize the post discharge care delivery of this patient.    C3 nurse spoke with Marcie Bazan  for a TCC post hospital discharge follow up call. The patient does not have a scheduled HOSFU appointment with ODILIA Wall  within 7-14 days post hospital discharge date 10\25\17. C3 nurse was unable to schedule HOSFU appointment in [facility].      Respectfully,  Laura Crespo RN  [facility]ination [facility] C3    [email or web address]       Please do not reply to this message, as this inbox is not routinely monitored.    Patient stated that she doesn't have Potassium filled. She was given that prescription right before going into hospital by her PCP. I instructed her that is very important for her to take it especially with being on diuretics. I educated her that Potassium has a narrow WNL range and can be life threatening if out of that range. I told her to get the Potassium today to which she agreed. She then wanted to know what the dissolvable medicine was in the hospital. According to her MAR it was Potassium. She stated that she did not receive a prescription for that kind. I told her she could call her PCP and see if she would write her a prescription for that kind.

## 2017-10-26 NOTE — TELEPHONE ENCOUNTER
Reason for Disposition   Caller has medication question only, adult not sick, and triager answers question    Protocols used: ST MEDICATION QUESTION CALL-A-    Liver Evaluation    Marcie is calling to ask about discharge medications.  All questions answered.

## 2017-10-27 ENCOUNTER — TELEPHONE (OUTPATIENT)
Dept: TRANSPLANT | Facility: HOSPITAL | Age: 52
End: 2017-10-27

## 2017-10-27 NOTE — TELEPHONE ENCOUNTER
KARLA received call from  at [facility] who reports that pt was declined for treatment due to decompensated liver cirrhosis. SW will notify the family. SW remains available.     KARLA contacted pt's daughter Segundo Bazan ([phone]) to notify her of denial for treatment at [facility]. Pt's daughter reports that she was able to find a residential program that is willing to take the pt and reports that pt has agreed to take part. The program is [facility] located at [address] [place] [address] (ph#[phone]). Daughter reports that the program is 28-30 days and she will be able to attend medical appointments as needed while in the program. Pt's daughter will keep liver transplant team afloat of pt's progress in order to get pt back on the docket for evaluation. SW remains available.

## 2017-10-30 ENCOUNTER — TELEPHONE (OUTPATIENT)
Dept: TRANSPLANT | Facility: CLINIC | Age: 52
End: 2017-10-30

## 2017-10-30 NOTE — TELEPHONE ENCOUNTER
----- Message from Nette Schneider sent at 10/30/2017  9:55 AM CDT -----  Contact: patient   Patient have a medical question and would like a call.        Please call [phone]        Thanks!!!

## 2017-10-30 NOTE — TELEPHONE ENCOUNTER
SW returned patient's call.   Patient inquiring as to she was waiting from a call from a SW.  This transplant SW discussed with patient that a transplant SW had spoken with pts dgtr several times prior to and after patient's dc, regarding patient enrolling in substance abuse rehab.   Pt unable to give SW this morning her start date at Substance Abuse rehab.  SW explained to patient that she will need to complete the substance abuse rehab program that she enrolls in and then once the program is completed and patient maintains sobriety, then patient needs to send proof of program completion to the transplant team.      KARLA did explain to patient that the  Case Management SW and nurse made all of her dc plans, including setting up HH services for patient.   SW inquired, as they should have provided pt with the name of the home health agency that they were setting her up with at dc.       KARLA sent an epic msg to both CM KARLA Aguirre and nurse Karyna Starr about pts dc home health referral and for one of them to call patient back with this information.     Pt verbalized understanding.    KARLA remains available for all transplant resources, education and psychosocial support while she completes substance abuse rehab.

## 2017-11-01 NOTE — PT/OT/SLP DISCHARGE
Physical Therapy Discharge Summary    Marcie Bazan  MRN: [id number]   Decompensated hepatic cirrhosis   Patient Discharged from acute Physical Therapy on 10/25/2017.  Please refer to prior PT noted date on 10/23/2017 for functional status.     Assessment:   Patient appropriate for care in another setting.  GOALS:    Physical Therapy Goals     Not on file          Multidisciplinary Problems (Resolved)        Problem: Physical Therapy Goal    Goal Priority Disciplines Outcome Goal Variances Interventions   Physical Therapy Goal   (Resolved)     PT/OT, PT Outcome(s) achieved     Description:  Goals to be met by: 10/30/2017    Patient will increase functional independence with mobility by performin. Supine to sit with Modified Placer  2. Sit to supine with Modified Placer  3. Sit to stand transfer with Supervision - Met  4. Bed to chair transfer with Supervision using appropriate Ad. - Met  5. Gait  x 100 feet with Minimal Assistance using appropriate AD - Met   Updated: gait x 125 ft Mod I without device.                     Reasons for Discontinuation of Therapy Services  Transfer to alternate level of care.      Plan:  Patient Discharged to: Alcohol rehab.    Filemon Feliciano III, DPT, PT  2017

## 2017-11-08 ENCOUNTER — OFFICE VISIT (OUTPATIENT)
Dept: GASTROENTEROLOGY | Facility: CLINIC | Age: 52
End: 2017-11-08
Payer: MEDICAID

## 2017-11-08 ENCOUNTER — LAB VISIT (OUTPATIENT)
Dept: LAB | Facility: HOSPITAL | Age: 52
End: 2017-11-08
Attending: INTERNAL MEDICINE
Payer: MEDICAID

## 2017-11-08 VITALS
DIASTOLIC BLOOD PRESSURE: 78 MMHG | HEART RATE: 100 BPM | BODY MASS INDEX: 25.71 KG/M2 | WEIGHT: 154.31 LBS | SYSTOLIC BLOOD PRESSURE: 122 MMHG | HEIGHT: 65 IN

## 2017-11-08 DIAGNOSIS — F10.10 ALCOHOL ABUSE: ICD-10-CM

## 2017-11-08 DIAGNOSIS — K70.31 ALCOHOLIC CIRRHOSIS OF LIVER WITH ASCITES: Primary | ICD-10-CM

## 2017-11-08 DIAGNOSIS — K76.9 LIVER LESION: ICD-10-CM

## 2017-11-08 DIAGNOSIS — K70.31 ALCOHOLIC CIRRHOSIS OF LIVER WITH ASCITES: ICD-10-CM

## 2017-11-08 DIAGNOSIS — K70.10 ACUTE ALCOHOLIC HEPATITIS: ICD-10-CM

## 2017-11-08 LAB
ALBUMIN SERPL BCP-MCNC: 2.7 G/DL
ALP SERPL-CCNC: 278 U/L
ALT SERPL W/O P-5'-P-CCNC: 98 U/L
ANION GAP SERPL CALC-SCNC: 10 MMOL/L
ANISOCYTOSIS BLD QL SMEAR: SLIGHT
AST SERPL-CCNC: 105 U/L
BASOPHILS # BLD AUTO: 0.01 K/UL
BASOPHILS NFR BLD: 0.1 %
BILIRUB SERPL-MCNC: 12.2 MG/DL
BUN SERPL-MCNC: 11 MG/DL
CALCIUM SERPL-MCNC: 9.5 MG/DL
CHLORIDE SERPL-SCNC: 88 MMOL/L
CO2 SERPL-SCNC: 23 MMOL/L
CREAT SERPL-MCNC: 0.9 MG/DL
DIFFERENTIAL METHOD: ABNORMAL
EOSINOPHIL # BLD AUTO: 0.1 K/UL
EOSINOPHIL NFR BLD: 0.4 %
ERYTHROCYTE [DISTWIDTH] IN BLOOD BY AUTOMATED COUNT: 24.1 %
EST. GFR  (AFRICAN AMERICAN): >60 ML/MIN/1.73 M^2
EST. GFR  (NON AFRICAN AMERICAN): >60 ML/MIN/1.73 M^2
GLUCOSE SERPL-MCNC: 124 MG/DL
HCT VFR BLD AUTO: 33.5 %
HGB BLD-MCNC: 12.6 G/DL
HYPOCHROMIA BLD QL SMEAR: ABNORMAL
INR PPP: 1.7
LYMPHOCYTES # BLD AUTO: 1.5 K/UL
LYMPHOCYTES NFR BLD: 10.8 %
MAGNESIUM SERPL-MCNC: 1.7 MG/DL
MCH RBC QN AUTO: 29 PG
MCHC RBC AUTO-ENTMCNC: 37.6 G/DL
MCV RBC AUTO: 77 FL
MONOCYTES # BLD AUTO: 0.1 K/UL
MONOCYTES NFR BLD: 0.9 %
NEUTROPHILS # BLD AUTO: 12.3 K/UL
NEUTROPHILS NFR BLD: 88.1 %
PHOSPHATE SERPL-MCNC: 3.3 MG/DL
PLATELET # BLD AUTO: 123 K/UL
PMV BLD AUTO: 10.2 FL
POLYCHROMASIA BLD QL SMEAR: ABNORMAL
POTASSIUM SERPL-SCNC: 4.5 MMOL/L
PROT SERPL-MCNC: 8 G/DL
PROTHROMBIN TIME: 17.2 SEC
RBC # BLD AUTO: 4.34 M/UL
SODIUM SERPL-SCNC: 121 MMOL/L
TARGETS BLD QL SMEAR: ABNORMAL
WBC # BLD AUTO: 13.94 K/UL

## 2017-11-08 PROCEDURE — 85025 COMPLETE CBC W/AUTO DIFF WBC: CPT | Mod: PO

## 2017-11-08 PROCEDURE — 85610 PROTHROMBIN TIME: CPT

## 2017-11-08 PROCEDURE — 82105 ALPHA-FETOPROTEIN SERUM: CPT

## 2017-11-08 PROCEDURE — 99215 OFFICE O/P EST HI 40 MIN: CPT | Mod: S$PBB,,, | Performed by: INTERNAL MEDICINE

## 2017-11-08 PROCEDURE — 84100 ASSAY OF PHOSPHORUS: CPT | Mod: PO

## 2017-11-08 PROCEDURE — 99213 OFFICE O/P EST LOW 20 MIN: CPT | Mod: PBBFAC,PO | Performed by: INTERNAL MEDICINE

## 2017-11-08 PROCEDURE — 99999 PR PBB SHADOW E&M-EST. PATIENT-LVL III: CPT | Mod: PBBFAC,,, | Performed by: INTERNAL MEDICINE

## 2017-11-08 PROCEDURE — 83735 ASSAY OF MAGNESIUM: CPT | Mod: PO

## 2017-11-08 PROCEDURE — 80053 COMPREHEN METABOLIC PANEL: CPT | Mod: PO

## 2017-11-08 PROCEDURE — 36415 COLL VENOUS BLD VENIPUNCTURE: CPT | Mod: PO

## 2017-11-08 NOTE — PROGRESS NOTES
Subjective:     Marcie Bazan is here for follow up of Nausea and Cirrhosis      HPI  Since Marcie Bazan's last visit she was admitted to the hospital but denied for transplant given alcohol abuse thought admission; confirmed on PETH test.  She now admits to EtOH and is in inpatient rehab. She continues with ascites, now with abdominal pain. +nausea, no vomiting. No F/V.  No overt confusion or GIB.    She continues on prednisone for suspected acute EtOH hep.    Overall still feels weak and fatigue but improved from previous.    Review of Systems   Constitutional: Positive for activity change (decreased), appetite change (decreased) and fatigue.   HENT: Negative.    Eyes: Negative.    Respiratory: Negative.    Cardiovascular: Negative.    Gastrointestinal: Positive for abdominal distention and nausea.   Genitourinary: Negative.    Musculoskeletal: Positive for arthralgias and myalgias.   Skin: Negative.    Neurological: Positive for weakness.   Psychiatric/Behavioral: Positive for decreased concentration.       Objective:     Physical Exam   Constitutional: She is oriented to person, place, and time. No distress.   Muscle wasting   HENT:   Head: Normocephalic and atraumatic.   Mouth/Throat: Oropharynx is clear and moist. No oropharyngeal exudate.   Eyes: Conjunctivae are normal. Pupils are equal, round, and reactive to light. Right eye exhibits no discharge. Left eye exhibits no discharge. Scleral icterus is present.   Pulmonary/Chest: Effort normal and breath sounds normal. No respiratory distress. She has no wheezes.   Abdominal: Soft. She exhibits distension (ascites, nontense). There is no tenderness. There is no guarding.   Musculoskeletal: She exhibits edema.   Neurological: She is alert and oriented to person, place, and time.   Psychiatric: She has a normal mood and affect. Her behavior is normal.   Vitals reviewed.    CT 10/2017  Sequela of cirrhosis and portal hypertension including; large ascites,  gastroesophageal varices, and probable portal colopathy.  Correlation with any right quadrant pain, differential for the cecal findings would include infectious or inflammatory colitis.    Approximately 1 cm focus of arterial enhancement within the inferior right hepatic lobe, with apparent washout, HCC not excluded although given heterogeneity of the hepatic parenchyma, this finding is indeterminate.  Please see above for additional arterial enhancing foci.  Followup is advised.  MELD-Na score: 30 at 11/13/2017  5:02 AM  MELD score: 23 at 11/13/2017  5:02 AM  Calculated from:  Serum Creatinine: 0.6 mg/dL (Rounded to 1) at 11/13/2017  5:02 AM  Serum Sodium: 120 mmol/L (Rounded to 125) at 11/13/2017  5:02 AM  Total Bilirubin: 11.5 mg/dL at 11/13/2017  5:02 AM  INR(ratio): 1.9 at 11/13/2017  5:02 AM  Age: 52 years    Bx 10/2017  FINAL PATHOLOGIC DIAGNOSIS  LIVER, RANDOM (NEEDLE BIOPSY):  Advanced stage fibrosis: Cirrhosis (stage 4 of 4)  Steatohepatitis with Courtney hyaline and mild steatosis, 20%  Moderate cholestasis  No hepatocyte iron  Iron and trichrome stains with appropriate controls  WBC   Date Value Ref Range Status   11/13/2017 12.25 3.90 - 12.70 K/uL Final     Hemoglobin   Date Value Ref Range Status   11/13/2017 10.3 (L) 12.0 - 16.0 g/dL Final     Hematocrit   Date Value Ref Range Status   11/13/2017 28.2 (L) 37.0 - 48.5 % Final     Platelets   Date Value Ref Range Status   11/13/2017 52 (L) 150 - 350 K/uL Final     BUN, Bld   Date Value Ref Range Status   11/13/2017 9 6 - 20 mg/dL Final     Creatinine   Date Value Ref Range Status   11/13/2017 0.6 0.5 - 1.4 mg/dL Final     Glucose   Date Value Ref Range Status   11/13/2017 95 70 - 110 mg/dL Final     Calcium   Date Value Ref Range Status   11/13/2017 8.1 (L) 8.7 - 10.5 mg/dL Final     Sodium   Date Value Ref Range Status   11/13/2017 120 (L) 136 - 145 mmol/L Final     Potassium   Date Value Ref Range Status   11/13/2017 4.2 3.5 - 5.1 mmol/L Final      Chloride   Date Value Ref Range Status   11/13/2017 96 95 - 110 mmol/L Final     Magnesium   Date Value Ref Range Status   11/11/2017 1.5 (L) 1.6 - 2.6 mg/dL Final     AST   Date Value Ref Range Status   11/13/2017 440 (H) 10 - 40 U/L Final     ALT   Date Value Ref Range Status   11/13/2017 392 (H) 10 - 44 U/L Final     Alkaline Phosphatase   Date Value Ref Range Status   11/13/2017 219 (H) 55 - 135 U/L Final     Total Bilirubin   Date Value Ref Range Status   11/13/2017 11.5 (H) 0.1 - 1.0 mg/dL Final     Comment:     For infants and newborns, interpretation of results should be based  on gestational age, weight and in agreement with clinical  observations.  Premature Infant recommended reference ranges:  Up to 24 hours.............<8.0 mg/dL  Up to 48 hours............<12.0 mg/dL  3-5 days..................<15.0 mg/dL  6-29 days.................<15.0 mg/dL       Albumin   Date Value Ref Range Status   11/13/2017 2.2 (L) 3.5 - 5.2 g/dL Final     INR   Date Value Ref Range Status   11/13/2017 1.9 (H) 0.8 - 1.2 Final     Comment:     Coumadin Therapy:  2.0 - 3.0 for INR for all indicators except mechanical heart valves  and antiphospholipid syndromes which should use 2.5 - 3.5.           Assessment/Plan:     1. Alcoholic cirrhosis of liver with ascites    2. Liver lesion    3. Alcohol abuse    4. Acute alcoholic hepatitis      Marcie Bazan is a 52 y.o. female withNausea and Cirrhosis    Alcoholic cirrhosis of liver with ascites- decompensated with elevated MELD, not a transplant candidate at this time. Overall prognossi is guarded  -Needs para now and likely every 2 weeks; will check for SBP  -     Protime-INR; Standing  -     US Paracentesis inc Imaging; Standing    Liver lesion- seen on recent CT but indeterminant  -Will monitor AFP monthly with labs  -Repeat CT 1/19/2018; will be sooner based on AFP  -     AFP tumor marker; Future; Expected date: 11/08/2017    Alcohol abuse-acknowldges she has a problem  now  -Needs to continue with rehab     Acute alcoholic hepatitis  -Continue with pred until ~11/25/2017 then will taper  -     Comprehensive metabolic panel; Standing  -     CBC auto differential; Standing  -     Magnesium; Standing  -     Phosphorus; Standing    RTC in 2 weeks      Joselin Souza MD

## 2017-11-09 ENCOUNTER — HOSPITAL ENCOUNTER (OUTPATIENT)
Dept: RADIOLOGY | Facility: HOSPITAL | Age: 52
Discharge: HOME | DRG: 641 | End: 2017-11-09
Attending: INTERNAL MEDICINE
Payer: MEDICAID

## 2017-11-09 ENCOUNTER — TELEPHONE (OUTPATIENT)
Dept: GASTROENTEROLOGY | Facility: CLINIC | Age: 52
End: 2017-11-09

## 2017-11-09 VITALS
BODY MASS INDEX: 25.66 KG/M2 | DIASTOLIC BLOOD PRESSURE: 69 MMHG | RESPIRATION RATE: 14 BRPM | WEIGHT: 154 LBS | TEMPERATURE: 98 F | SYSTOLIC BLOOD PRESSURE: 133 MMHG | HEART RATE: 88 BPM | OXYGEN SATURATION: 100 % | HEIGHT: 65 IN

## 2017-11-09 DIAGNOSIS — K70.31 ALCOHOLIC CIRRHOSIS OF LIVER WITH ASCITES: Primary | ICD-10-CM

## 2017-11-09 DIAGNOSIS — K70.31 ALCOHOLIC CIRRHOSIS OF LIVER WITH ASCITES: ICD-10-CM

## 2017-11-09 LAB
AFP SERPL-MCNC: 39 NG/ML
APPEARANCE FLD: CLEAR
BODY FLD TYPE: NORMAL
COLOR FLD: YELLOW
LYMPHOCYTES NFR FLD MANUAL: 13 %
MONOS+MACROS NFR FLD MANUAL: 85 %
NEUTROPHILS NFR FLD MANUAL: 2 %
WBC # FLD: 216 /CU MM

## 2017-11-09 PROCEDURE — 63600175 PHARM REV CODE 636 W HCPCS: Performed by: INTERNAL MEDICINE

## 2017-11-09 PROCEDURE — 89051 BODY FLUID CELL COUNT: CPT

## 2017-11-09 PROCEDURE — A7048 VACUUM DRAIN BOTTLE/TUBE KIT: HCPCS

## 2017-11-09 PROCEDURE — P9047 ALBUMIN (HUMAN), 25%, 50ML: HCPCS | Performed by: INTERNAL MEDICINE

## 2017-11-09 PROCEDURE — C1729 CATH, DRAINAGE: HCPCS

## 2017-11-09 RX ORDER — ALBUMIN HUMAN 250 G/1000ML
20 SOLUTION INTRAVENOUS ONCE
Status: COMPLETED | OUTPATIENT
Start: 2017-11-09 | End: 2017-11-09

## 2017-11-09 RX ADMIN — ALBUMIN (HUMAN) 20 G: 25 SOLUTION INTRAVENOUS at 03:11

## 2017-11-09 NOTE — TELEPHONE ENCOUNTER
----- Message from Hal Washburn sent at 11/9/2017  2:04 PM CST -----  Contact: pt  She's calling in regards to the pt's lab results & scheduled appt, [phone] (scdk)

## 2017-11-09 NOTE — DISCHARGE INSTRUCTIONS

## 2017-11-09 NOTE — TELEPHONE ENCOUNTER
----- Message from Kendra Schneider sent at 11/9/2017  8:20 AM CST -----  Contact: pt zackary   Pt daughter returning nurses call,,, please call back at [phone]

## 2017-11-09 NOTE — H&P
Most recent H&P has been reviewed and patient seen without significant changes.  Risks and benefits were explained to the patient prior to the procedure.  Written and informed consent was obtained.

## 2017-11-09 NOTE — PROGRESS NOTES
Paracentesis complete. Pt tolerated well. Denies discomfort. 2 liters cale colored fluid removed. Specimen obtained and sent to lab for analysis. Albumin admin per MD order. Vss. Band-aid in place to healthy site, no signs of bleeding noted.

## 2017-11-09 NOTE — DISCHARGE SUMMARY
Sterile technique was performed in the RLQ, lidocaine was used as a local anesthetic.  2 liters of cale fluid removed for therapeutic purposes.  Pt tolerated the procedure well without immediate complications.  Please see radiologist report for details. F/u with PCP and/or ordering physician.

## 2017-11-09 NOTE — PROGRESS NOTES
Patient discharged home using walker with daughter for transport home. Patient provided discharge education. Stated understanding. VSS. Band-aid in place to healthy site, no bleeding noted. Denies discomfort.

## 2017-11-10 ENCOUNTER — TELEPHONE (OUTPATIENT)
Dept: GASTROENTEROLOGY | Facility: CLINIC | Age: 52
End: 2017-11-10

## 2017-11-10 ENCOUNTER — HOSPITAL ENCOUNTER (INPATIENT)
Facility: HOSPITAL | Age: 52
LOS: 5 days | Discharge: HOME | DRG: 641 | End: 2017-11-15
Attending: EMERGENCY MEDICINE | Admitting: INTERNAL MEDICINE
Payer: MEDICAID

## 2017-11-10 DIAGNOSIS — I10 ESSENTIAL HYPERTENSION: Chronic | ICD-10-CM

## 2017-11-10 DIAGNOSIS — R18.8 OTHER ASCITES: ICD-10-CM

## 2017-11-10 DIAGNOSIS — K70.11 ALCOHOLIC HEPATITIS WITH ASCITES: Chronic | ICD-10-CM

## 2017-11-10 DIAGNOSIS — E87.1 HYPONATREMIA: Primary | ICD-10-CM

## 2017-11-10 PROBLEM — D68.9 COAGULOPATHY: Chronic | Status: ACTIVE | Noted: 2017-09-13

## 2017-11-10 PROBLEM — K72.10 END STAGE LIVER DISEASE: Chronic | Status: ACTIVE | Noted: 2017-11-10

## 2017-11-10 LAB
ALBUMIN SERPL BCP-MCNC: 2.7 G/DL
ALP SERPL-CCNC: 252 U/L
ALT SERPL W/O P-5'-P-CCNC: 199 U/L
AMMONIA PLAS-SCNC: 33 UMOL/L
ANION GAP SERPL CALC-SCNC: 11 MMOL/L
ANISOCYTOSIS BLD QL SMEAR: SLIGHT
AST SERPL-CCNC: 232 U/L
BASOPHILS # BLD AUTO: 0.01 K/UL
BASOPHILS NFR BLD: 0.1 %
BILIRUB SERPL-MCNC: 13.6 MG/DL
BILIRUB UR QL STRIP: NEGATIVE
BUN SERPL-MCNC: 13 MG/DL
CALCIUM SERPL-MCNC: 9.3 MG/DL
CHLORIDE SERPL-SCNC: 87 MMOL/L
CLARITY UR: CLEAR
CO2 SERPL-SCNC: 21 MMOL/L
COLOR UR: YELLOW
CREAT SERPL-MCNC: 0.9 MG/DL
DIFFERENTIAL METHOD: ABNORMAL
EOSINOPHIL # BLD AUTO: 0 K/UL
EOSINOPHIL NFR BLD: 0.1 %
ERYTHROCYTE [DISTWIDTH] IN BLOOD BY AUTOMATED COUNT: 23.8 %
EST. GFR  (AFRICAN AMERICAN): >60 ML/MIN/1.73 M^2
EST. GFR  (NON AFRICAN AMERICAN): >60 ML/MIN/1.73 M^2
GLUCOSE SERPL-MCNC: 152 MG/DL
GLUCOSE UR QL STRIP: NEGATIVE
HCT VFR BLD AUTO: 31.8 %
HGB BLD-MCNC: 11.8 G/DL
HGB UR QL STRIP: NEGATIVE
HYPOCHROMIA BLD QL SMEAR: ABNORMAL
KETONES UR QL STRIP: NEGATIVE
LEUKOCYTE ESTERASE UR QL STRIP: NEGATIVE
LIPASE SERPL-CCNC: 38 U/L
LYMPHOCYTES # BLD AUTO: 0.6 K/UL
LYMPHOCYTES NFR BLD: 5 %
MCH RBC QN AUTO: 28.7 PG
MCHC RBC AUTO-ENTMCNC: 37.1 G/DL
MCV RBC AUTO: 77 FL
MONOCYTES # BLD AUTO: 0.2 K/UL
MONOCYTES NFR BLD: 1.9 %
NEUTROPHILS # BLD AUTO: 11.2 K/UL
NEUTROPHILS NFR BLD: 93.3 %
NITRITE UR QL STRIP: NEGATIVE
OVALOCYTES BLD QL SMEAR: ABNORMAL
PATH INTERP FLD-IMP: NORMAL
PH UR STRIP: 6 [PH] (ref 5–8)
PLATELET # BLD AUTO: 109 K/UL
PLATELET BLD QL SMEAR: ABNORMAL
PMV BLD AUTO: ABNORMAL FL
POIKILOCYTOSIS BLD QL SMEAR: ABNORMAL
POLYCHROMASIA BLD QL SMEAR: ABNORMAL
POTASSIUM SERPL-SCNC: 4.4 MMOL/L
PROT SERPL-MCNC: 7.3 G/DL
PROT UR QL STRIP: NEGATIVE
RBC # BLD AUTO: 4.11 M/UL
SODIUM SERPL-SCNC: 119 MMOL/L
SODIUM UR-SCNC: 75 MMOL/L
SP GR UR STRIP: 1.02 (ref 1–1.03)
TARGETS BLD QL SMEAR: ABNORMAL
URN SPEC COLLECT METH UR: NORMAL
UROBILINOGEN UR STRIP-ACNC: NEGATIVE EU/DL
WBC # BLD AUTO: 12.01 K/UL

## 2017-11-10 PROCEDURE — 83690 ASSAY OF LIPASE: CPT

## 2017-11-10 PROCEDURE — 99285 EMERGENCY DEPT VISIT HI MDM: CPT | Mod: 25

## 2017-11-10 PROCEDURE — 21400001 HC TELEMETRY ROOM

## 2017-11-10 PROCEDURE — 25000003 PHARM REV CODE 250: Performed by: EMERGENCY MEDICINE

## 2017-11-10 PROCEDURE — 96360 HYDRATION IV INFUSION INIT: CPT

## 2017-11-10 PROCEDURE — 81003 URINALYSIS AUTO W/O SCOPE: CPT

## 2017-11-10 PROCEDURE — 80053 COMPREHEN METABOLIC PANEL: CPT

## 2017-11-10 PROCEDURE — 96361 HYDRATE IV INFUSION ADD-ON: CPT

## 2017-11-10 PROCEDURE — 85025 COMPLETE CBC W/AUTO DIFF WBC: CPT

## 2017-11-10 PROCEDURE — 25000003 PHARM REV CODE 250: Performed by: NURSE PRACTITIONER

## 2017-11-10 PROCEDURE — 84300 ASSAY OF URINE SODIUM: CPT

## 2017-11-10 PROCEDURE — 83935 ASSAY OF URINE OSMOLALITY: CPT

## 2017-11-10 PROCEDURE — 82140 ASSAY OF AMMONIA: CPT

## 2017-11-10 RX ORDER — SODIUM CHLORIDE 9 MG/ML
1000 INJECTION, SOLUTION INTRAVENOUS
Status: COMPLETED | OUTPATIENT
Start: 2017-11-10 | End: 2017-11-10

## 2017-11-10 RX ORDER — ONDANSETRON 4 MG/1
4 TABLET, ORALLY DISINTEGRATING ORAL EVERY 8 HOURS PRN
Status: DISCONTINUED | OUTPATIENT
Start: 2017-11-10 | End: 2017-11-15 | Stop reason: HOSPADM

## 2017-11-10 RX ORDER — RAMELTEON 8 MG/1
8 TABLET ORAL NIGHTLY PRN
Status: DISCONTINUED | OUTPATIENT
Start: 2017-11-10 | End: 2017-11-15 | Stop reason: HOSPADM

## 2017-11-10 RX ORDER — LACTULOSE 10 G/15ML
10 SOLUTION ORAL 3 TIMES DAILY
Status: DISCONTINUED | OUTPATIENT
Start: 2017-11-11 | End: 2017-11-13

## 2017-11-10 RX ORDER — CETIRIZINE HYDROCHLORIDE 10 MG/1
10 TABLET ORAL DAILY
Status: DISCONTINUED | OUTPATIENT
Start: 2017-11-11 | End: 2017-11-15 | Stop reason: HOSPADM

## 2017-11-10 RX ORDER — SODIUM CHLORIDE 9 MG/ML
INJECTION, SOLUTION INTRAVENOUS CONTINUOUS
Status: DISCONTINUED | OUTPATIENT
Start: 2017-11-10 | End: 2017-11-13

## 2017-11-10 RX ORDER — PANTOPRAZOLE SODIUM 40 MG/1
40 TABLET, DELAYED RELEASE ORAL DAILY
Status: DISCONTINUED | OUTPATIENT
Start: 2017-11-11 | End: 2017-11-15 | Stop reason: HOSPADM

## 2017-11-10 RX ORDER — PREDNISONE 20 MG/1
40 TABLET ORAL DAILY
Status: DISCONTINUED | OUTPATIENT
Start: 2017-11-11 | End: 2017-11-15 | Stop reason: HOSPADM

## 2017-11-10 RX ORDER — LACTULOSE 10 G/15ML
SOLUTION ORAL; RECTAL
Refills: 2 | Status: ON HOLD | COMMUNITY
Start: 2017-11-07 | End: 2018-01-04 | Stop reason: ALTCHOICE

## 2017-11-10 RX ADMIN — SODIUM CHLORIDE 1000 ML: 0.9 INJECTION, SOLUTION INTRAVENOUS at 07:11

## 2017-11-10 RX ADMIN — SODIUM CHLORIDE: 0.9 INJECTION, SOLUTION INTRAVENOUS at 11:11

## 2017-11-10 NOTE — TELEPHONE ENCOUNTER
----- Message from Sol Hernandes sent at 11/10/2017 12:09 PM CST -----  Contact: [facility]  Please call nurse @ [phone], Ext [phone] regarding pt.

## 2017-11-10 NOTE — TELEPHONE ENCOUNTER
Spoke with Milton with [facility]. Does patient need to continue taking Lactulose 45 mls four times a day. Will inform Dr. Souza, she verbalized understanding, REINALDO.

## 2017-11-10 NOTE — TELEPHONE ENCOUNTER
Spoke with patient's daughter as well as the facility with the patient is staying explained that her sodium is 121.  The patient has continued to be lethargic, weak with nausea.  Therefore recommended she go to the emergency room for evaluation and likely admission.  She will need her diuretics stopped.  And she will need to be on a fluid restriction of 1.5 L with no free water.  I will call the emergency room and let them know that she will be coming.

## 2017-11-10 NOTE — TELEPHONE ENCOUNTER
----- Message from Oksana Grimm sent at 11/10/2017 10:23 AM CST -----  Contact: Janeen/nurse/[facility] [place]  States she needs to verify if Dr Souza wants to continue the lactulose 45 ml 4 times a day. Please call Janeen [phone] and then [phone] for the nurses station. Thank you

## 2017-11-11 LAB
ANION GAP SERPL CALC-SCNC: 10 MMOL/L
ANION GAP SERPL CALC-SCNC: 8 MMOL/L
ANION GAP SERPL CALC-SCNC: 9 MMOL/L
ANISOCYTOSIS BLD QL SMEAR: SLIGHT
BASOPHILS # BLD AUTO: 0.03 K/UL
BASOPHILS NFR BLD: 0.2 %
BUN SERPL-MCNC: 16 MG/DL
BUN SERPL-MCNC: 17 MG/DL
BUN SERPL-MCNC: 19 MG/DL
CALCIUM SERPL-MCNC: 8.9 MG/DL
CALCIUM SERPL-MCNC: 9.2 MG/DL
CALCIUM SERPL-MCNC: 9.3 MG/DL
CHLORIDE SERPL-SCNC: 89 MMOL/L
CHLORIDE SERPL-SCNC: 91 MMOL/L
CHLORIDE SERPL-SCNC: 92 MMOL/L
CO2 SERPL-SCNC: 18 MMOL/L
CO2 SERPL-SCNC: 18 MMOL/L
CO2 SERPL-SCNC: 19 MMOL/L
CREAT SERPL-MCNC: 0.7 MG/DL
CREAT SERPL-MCNC: 0.8 MG/DL
CREAT SERPL-MCNC: 1.1 MG/DL
DACRYOCYTES BLD QL SMEAR: ABNORMAL
DIFFERENTIAL METHOD: ABNORMAL
EOSINOPHIL # BLD AUTO: 0 K/UL
EOSINOPHIL NFR BLD: 0.1 %
ERYTHROCYTE [DISTWIDTH] IN BLOOD BY AUTOMATED COUNT: 23.6 %
EST. GFR  (AFRICAN AMERICAN): >60 ML/MIN/1.73 M^2
EST. GFR  (NON AFRICAN AMERICAN): 57 ML/MIN/1.73 M^2
EST. GFR  (NON AFRICAN AMERICAN): >60 ML/MIN/1.73 M^2
EST. GFR  (NON AFRICAN AMERICAN): >60 ML/MIN/1.73 M^2
GLUCOSE SERPL-MCNC: 102 MG/DL
GLUCOSE SERPL-MCNC: 116 MG/DL
GLUCOSE SERPL-MCNC: 126 MG/DL
HCT VFR BLD AUTO: 29.2 %
HGB BLD-MCNC: 10.8 G/DL
HYPOCHROMIA BLD QL SMEAR: ABNORMAL
INR PPP: 1.7
LYMPHOCYTES # BLD AUTO: 1.3 K/UL
LYMPHOCYTES NFR BLD: 9.2 %
MAGNESIUM SERPL-MCNC: 1.5 MG/DL
MCH RBC QN AUTO: 28.2 PG
MCHC RBC AUTO-ENTMCNC: 37 G/DL
MCV RBC AUTO: 76 FL
MONOCYTES # BLD AUTO: 1 K/UL
MONOCYTES NFR BLD: 7.2 %
NEUTROPHILS # BLD AUTO: 11.3 K/UL
NEUTROPHILS NFR BLD: 83.6 %
OSMOLALITY UR: 415 MOSM/KG
OVALOCYTES BLD QL SMEAR: ABNORMAL
PLATELET # BLD AUTO: 61 K/UL
PLATELET BLD QL SMEAR: ABNORMAL
PMV BLD AUTO: ABNORMAL FL
POIKILOCYTOSIS BLD QL SMEAR: SLIGHT
POLYCHROMASIA BLD QL SMEAR: ABNORMAL
POTASSIUM SERPL-SCNC: 4.5 MMOL/L
POTASSIUM SERPL-SCNC: 4.7 MMOL/L
POTASSIUM SERPL-SCNC: 4.7 MMOL/L
PROTHROMBIN TIME: 17.5 SEC
RBC # BLD AUTO: 3.83 M/UL
SCHISTOCYTES BLD QL SMEAR: PRESENT
SODIUM SERPL-SCNC: 118 MMOL/L
TARGETS BLD QL SMEAR: ABNORMAL
URATE SERPL-MCNC: 4 MG/DL
WBC # BLD AUTO: 13.55 K/UL

## 2017-11-11 PROCEDURE — 99232 SBSQ HOSP IP/OBS MODERATE 35: CPT | Mod: ,,, | Performed by: INTERNAL MEDICINE

## 2017-11-11 PROCEDURE — 84550 ASSAY OF BLOOD/URIC ACID: CPT

## 2017-11-11 PROCEDURE — P9047 ALBUMIN (HUMAN), 25%, 50ML: HCPCS | Performed by: INTERNAL MEDICINE

## 2017-11-11 PROCEDURE — 80048 BASIC METABOLIC PNL TOTAL CA: CPT

## 2017-11-11 PROCEDURE — 36415 COLL VENOUS BLD VENIPUNCTURE: CPT

## 2017-11-11 PROCEDURE — 25000003 PHARM REV CODE 250: Performed by: NURSE PRACTITIONER

## 2017-11-11 PROCEDURE — 80048 BASIC METABOLIC PNL TOTAL CA: CPT | Mod: 91

## 2017-11-11 PROCEDURE — 63600175 PHARM REV CODE 636 W HCPCS: Performed by: FAMILY MEDICINE

## 2017-11-11 PROCEDURE — 21400001 HC TELEMETRY ROOM

## 2017-11-11 PROCEDURE — 63600175 PHARM REV CODE 636 W HCPCS: Performed by: NURSE PRACTITIONER

## 2017-11-11 PROCEDURE — 85610 PROTHROMBIN TIME: CPT

## 2017-11-11 PROCEDURE — 85025 COMPLETE CBC W/AUTO DIFF WBC: CPT

## 2017-11-11 PROCEDURE — 63600175 PHARM REV CODE 636 W HCPCS: Performed by: INTERNAL MEDICINE

## 2017-11-11 PROCEDURE — 83735 ASSAY OF MAGNESIUM: CPT

## 2017-11-11 RX ORDER — ALBUMIN HUMAN 250 G/1000ML
25 SOLUTION INTRAVENOUS ONCE
Status: COMPLETED | OUTPATIENT
Start: 2017-11-11 | End: 2017-11-11

## 2017-11-11 RX ORDER — MAGNESIUM SULFATE 1 G/100ML
1 INJECTION INTRAVENOUS
Status: COMPLETED | OUTPATIENT
Start: 2017-11-11 | End: 2017-11-11

## 2017-11-11 RX ADMIN — MAGNESIUM SULFATE IN DEXTROSE 1 G: 10 INJECTION, SOLUTION INTRAVENOUS at 03:11

## 2017-11-11 RX ADMIN — LACTULOSE 10 G: 10 SOLUTION ORAL at 09:11

## 2017-11-11 RX ADMIN — CETIRIZINE HYDROCHLORIDE 10 MG: 10 TABLET, FILM COATED ORAL at 09:11

## 2017-11-11 RX ADMIN — LACTULOSE 10 G: 10 SOLUTION ORAL at 01:11

## 2017-11-11 RX ADMIN — PREDNISONE 40 MG: 20 TABLET ORAL at 09:11

## 2017-11-11 RX ADMIN — PANTOPRAZOLE SODIUM 40 MG: 40 TABLET, DELAYED RELEASE ORAL at 09:11

## 2017-11-11 RX ADMIN — ALBUMIN HUMAN 25 G: 0.25 SOLUTION INTRAVENOUS at 11:11

## 2017-11-11 RX ADMIN — MAGNESIUM SULFATE IN DEXTROSE 1 G: 10 INJECTION, SOLUTION INTRAVENOUS at 05:11

## 2017-11-11 RX ADMIN — LACTULOSE 10 G: 10 SOLUTION ORAL at 05:11

## 2017-11-11 NOTE — ASSESSMENT & PLAN NOTE
Likely from combination of diuretics, volume depletion (from diarrhea, diuretics, recent paracentesis). Hold diuretics. Restrict fluid intake to 1.5L daily. Recent TSH normal. No missed doses of prednisone to raise suspicion for possible adrenal insufficiency. Will give IV albumin to replace volume. Agree with nephrology consult.

## 2017-11-11 NOTE — SUBJECTIVE & OBJECTIVE
Past Medical History:   Diagnosis Date    Hypertension     Liver cirrhosis        Past Surgical History:   Procedure Laterality Date    BREAST SURGERY      CHOLECYSTECTOMY      HYSTERECTOMY         Review of patient's allergies indicates:   Allergen Reactions    Ferrous sulfate Other (See Comments)     Patient states the pill makes her sick. She stated she would rather have a shot     Current Facility-Administered Medications   Medication Frequency    0.9%  NaCl infusion Continuous    cetirizine tablet 10 mg Daily    lactulose 20 gram/30 mL solution Soln 10 g TID    ondansetron disintegrating tablet 4 mg Q8H PRN    pantoprazole EC tablet 40 mg Daily    predniSONE tablet 40 mg Daily    ramelteon tablet 8 mg Nightly PRN     Family History     Problem Relation (Age of Onset)    Depression Maternal Grandfather    Diabetes Father, Sister    Hypertension Mother, Father        Social History Main Topics    Smoking status: Never Smoker    Smokeless tobacco: Never Used    Alcohol use Yes      Comment: occasionally    Drug use: No    Sexual activity: Not Currently     Review of Systems   Constitutional: Positive for activity change, appetite change and fatigue. Negative for fever.   HENT: Negative for congestion, facial swelling, sore throat, trouble swallowing and voice change.    Eyes: Negative for redness and visual disturbance.   Respiratory: Negative for apnea, cough, chest tightness, shortness of breath and wheezing.    Cardiovascular: Negative for chest pain, palpitations and leg swelling.   Gastrointestinal: Positive for abdominal distention and nausea. Negative for abdominal pain, blood in stool, constipation, diarrhea and vomiting.   Genitourinary: Negative for decreased urine volume, difficulty urinating, dysuria, flank pain, frequency, hematuria, pelvic pain and urgency.   Musculoskeletal: Negative for back pain, gait problem and joint swelling.   Skin: Negative for color change and rash.    Neurological: Negative for dizziness, syncope, weakness and headaches.   Hematological: Does not bruise/bleed easily.   Psychiatric/Behavioral: Negative for agitation, behavioral problems and confusion. The patient is not nervous/anxious.      Objective:     Vital Signs (Most Recent):  Temp: 97.9 °F (36.6 °C) (11/11/17 1121)  Pulse: 79 (11/11/17 1121)  Resp: 18 (11/11/17 1121)  BP: (!) 113/59 (11/11/17 1121)  SpO2: 100 % (11/11/17 1121)  O2 Device (Oxygen Therapy): room air (11/11/17 1121) Vital Signs (24h Range):  Temp:  [97 °F (36.1 °C)-97.9 °F (36.6 °C)] 97.9 °F (36.6 °C)  Pulse:  [78-92] 79  Resp:  [17-18] 18  SpO2:  [99 %-100 %] 100 %  BP: (104-115)/(45-59) 113/59     Weight: 65.3 kg (143 lb 15.4 oz) (11/11/17 0346)  Body mass index is 23.96 kg/m².  Body surface area is 1.73 meters squared.    I/O last 3 completed shifts:  In: 515 [I.V.:515]  Out: -     Physical Exam   Constitutional: She is oriented to person, place, and time. She appears well-developed. No distress.   HENT:   Head: Normocephalic and atraumatic.   Mouth/Throat: Oropharynx is clear and moist. No oropharyngeal exudate.   Eyes: EOM are normal. Pupils are equal, round, and reactive to light.   Icteric sclera   Neck: Normal range of motion. Neck supple. No JVD present. Carotid bruit is not present. No tracheal deviation present. No thyroid mass and no thyromegaly present.   Cardiovascular: Normal rate, regular rhythm, normal heart sounds and intact distal pulses.  Exam reveals no gallop and no friction rub.    No murmur heard.  Pulmonary/Chest: Effort normal and breath sounds normal. No respiratory distress. She has no wheezes. She has no rales. She exhibits no tenderness.   Abdominal: Soft. Bowel sounds are normal. She exhibits distension. She exhibits no abdominal bruit, no ascites and no mass. There is no hepatosplenomegaly. There is no tenderness. There is no rebound, no guarding and no CVA tenderness.   Musculoskeletal: Normal range of  motion. She exhibits no edema or tenderness.   Lymphadenopathy:     She has no cervical adenopathy.   Neurological: She is alert and oriented to person, place, and time. She has normal reflexes. She displays normal reflexes. No cranial nerve deficit. She exhibits normal muscle tone. Coordination normal.   Skin: Skin is warm and intact. No rash noted. No erythema. No pallor.       Significant Labs:  CBC:   Recent Labs  Lab 11/11/17 0424   WBC 13.55*   RBC 3.83*   HGB 10.8*   HCT 29.2*   PLT 61*   MCV 76*   MCH 28.2   MCHC 37.0*     CMP:   Recent Labs  Lab 11/10/17  1902  11/11/17  0752   *  < > 102   CALCIUM 9.3  < > 8.9   ALBUMIN 2.7*  --   --    PROT 7.3  --   --    *  < > 118*   K 4.4  < > 4.7   CO2 21*  < > 18*   CL 87*  < > 92*   BUN 13  < > 19   CREATININE 0.9  < > 0.8   ALKPHOS 252*  --   --    *  --   --    *  --   --    BILITOT 13.6*  --   --    < > = values in this interval not displayed.  Coagulation:   Recent Labs  Lab 11/09/17  1357 11/11/17 0424   INR 1.7* 1.7*   APTT 32.2*  --      LFTs:   Recent Labs  Lab 11/10/17  1902   *   *   ALKPHOS 252*   BILITOT 13.6*   PROT 7.3   ALBUMIN 2.7*       Lab Results   Component Value Date    CALCIUM 8.9 11/11/2017    PHOS 3.3 11/08/2017       All labs within the past 24 hours have been reviewed.    Significant Imaging: reviewed

## 2017-11-11 NOTE — ASSESSMENT & PLAN NOTE
- MELD score of 29.  On liver transplant list.  - Continue home lactulose.  - LFT's increased from baseline at 232/199.  - Ammonia WNL at 33.  - Consult Dr. Souza/GI in AM.  - Avoid hepatotoxic agents.

## 2017-11-11 NOTE — SUBJECTIVE & OBJECTIVE
Past Medical History:   Diagnosis Date    Hypertension     Liver cirrhosis        Past Surgical History:   Procedure Laterality Date    BREAST SURGERY      CHOLECYSTECTOMY      HYSTERECTOMY         Review of patient's allergies indicates:   Allergen Reactions    Ferrous sulfate Other (See Comments)     Patient states the pill makes her sick. She stated she would rather have a shot     Family History     Problem Relation (Age of Onset)    Depression Maternal Grandfather    Diabetes Father, Sister    Hypertension Mother, Father        Social History Main Topics    Smoking status: Never Smoker    Smokeless tobacco: Never Used    Alcohol use Yes      Comment: occasionally    Drug use: No    Sexual activity: Not Currently     Review of Systems   Constitutional: Positive for fatigue. Negative for chills, fever and unexpected weight change.   HENT: Negative for sore throat and trouble swallowing.    Eyes: Negative for pain, redness and visual disturbance.   Respiratory: Negative for cough, chest tightness and shortness of breath.    Cardiovascular: Negative for chest pain, palpitations and leg swelling.   Gastrointestinal: Positive for abdominal distention and diarrhea. Negative for abdominal pain, blood in stool, constipation, nausea and vomiting.   Genitourinary: Negative for difficulty urinating, dysuria and hematuria.   Musculoskeletal: Negative for arthralgias, back pain and joint swelling.   Skin: Negative for color change, pallor and rash.   Neurological: Negative for dizziness, seizures, light-headedness and headaches.   Hematological: Negative for adenopathy.   Psychiatric/Behavioral: The patient is not nervous/anxious.      Objective:     Vital Signs (Most Recent):  Temp: 97.7 °F (36.5 °C) (11/11/17 0816)  Pulse: 82 (11/11/17 0816)  Resp: 18 (11/11/17 0816)  BP: (!) 109/54 (11/11/17 0816)  SpO2: 100 % (11/11/17 0816) Vital Signs (24h Range):  Temp:  [97 °F (36.1 °C)-97.9 °F (36.6 °C)] 97.7 °F (36.5  °C)  Pulse:  [78-92] 82  Resp:  [17-18] 18  SpO2:  [99 %-100 %] 100 %  BP: (104-115)/(45-54) 109/54     Weight: 65.3 kg (143 lb 15.4 oz) (11/11/17 0346)  Body mass index is 23.96 kg/m².      Intake/Output Summary (Last 24 hours) at 11/11/17 0945  Last data filed at 11/11/17 0600   Gross per 24 hour   Intake              515 ml   Output                0 ml   Net              515 ml       Lines/Drains/Airways     Peripheral Intravenous Line                 Peripheral IV - Single Lumen 11/10/17 1900 Right Antecubital less than 1 day                Physical Exam   Constitutional: She is oriented to person, place, and time.   Chronically ill appearing   HENT:   Mouth/Throat: Oropharynx is clear and moist.   Eyes: Conjunctivae are normal. Pupils are equal, round, and reactive to light. Scleral icterus is present.   Neck: No thyromegaly present.   Cardiovascular: Normal rate, regular rhythm and normal heart sounds.    No murmur heard.  Pulmonary/Chest: Effort normal and breath sounds normal. She has no wheezes. She has no rales.   Abdominal: Soft. Bowel sounds are normal. She exhibits distension. She exhibits no mass. There is no hepatosplenomegaly. There is no tenderness.   Musculoskeletal: Normal range of motion. She exhibits no edema or tenderness.   Lymphadenopathy:     She has no cervical adenopathy.   Neurological: She is alert and oriented to person, place, and time.   No asterixis   Skin: No rash noted. No erythema.   Psychiatric: She has a normal mood and affect. Her behavior is normal.   Nursing note and vitals reviewed.      Significant Labs:  CBC:   Recent Labs  Lab 11/10/17  1902 11/11/17  0424   WBC 12.01 13.55*   HGB 11.8* 10.8*   HCT 31.8* 29.2*   * 61*     CMP:   Recent Labs  Lab 11/10/17  1902  11/11/17  0752   *  < > 102   CALCIUM 9.3  < > 8.9   ALBUMIN 2.7*  --   --    PROT 7.3  --   --    *  < > 118*   K 4.4  < > 4.7   CO2 21*  < > 18*   CL 87*  < > 92*   BUN 13  < > 19   CREATININE  0.9  < > 0.8   ALKPHOS 252*  --   --    *  --   --    *  --   --    BILITOT 13.6*  --   --    < > = values in this interval not displayed.  Coagulation:   Recent Labs  Lab 11/09/17  1357 11/11/17  0424   INR 1.7* 1.7*   APTT 32.2*  --        Significant Imaging:  No pertinent studies     Scheduled Meds:   albumin human 25%  25 g Intravenous Once    cetirizine  10 mg Oral Daily    lactulose  10 g Oral TID    pantoprazole  40 mg Oral Daily    predniSONE  40 mg Oral Daily     Continuous Infusions:   sodium chloride 0.9% 75 mL/hr at 11/10/17 2898     PRN Meds:.ondansetron, ramelteon

## 2017-11-11 NOTE — SUBJECTIVE & OBJECTIVE
Past Medical History:   Diagnosis Date    Hypertension     Liver cirrhosis        Past Surgical History:   Procedure Laterality Date    BREAST SURGERY      CHOLECYSTECTOMY      HYSTERECTOMY         Review of patient's allergies indicates:   Allergen Reactions    Ferrous sulfate Other (See Comments)     Patient states the pill makes her sick. She stated she would rather have a shot       No current facility-administered medications on file prior to encounter.      Current Outpatient Prescriptions on File Prior to Encounter   Medication Sig    cetirizine (ZYRTEC) 10 MG tablet Take 10 mg by mouth once daily.    furosemide (LASIX) 40 MG tablet Take 1 tablet (40 mg total) by mouth once daily.    lisinopril (PRINIVIL,ZESTRIL) 5 MG tablet Take 1 tablet by mouth only as needed for high blood pressure    omeprazole (PRILOSEC) 40 MG capsule Take 40 mg by mouth once daily.    ondansetron (ZOFRAN-ODT) 4 MG TbDL Take 1 tablet (4 mg total) by mouth every 8 (eight) hours as needed (nausea and vomiting).    potassium chloride SA (K-DUR,KLOR-CON) 20 MEQ tablet Take 1 mEq by mouth 2 (two) times daily.    predniSONE (DELTASONE) 20 MG tablet Take 2 tablets (40 mg total) by mouth once daily.    spironolactone (ALDACTONE) 100 MG tablet Take 3 tablets (300 mg total) by mouth once daily.     Family History     Problem Relation (Age of Onset)    Depression Maternal Grandfather    Diabetes Father, Sister    Hypertension Mother, Father        Social History Main Topics    Smoking status: Never Smoker    Smokeless tobacco: Never Used    Alcohol use Yes      Comment: occasionally    Drug use: No    Sexual activity: Not Currently     Review of Systems   Constitutional: Positive for fatigue. Negative for activity change, chills, diaphoresis, fever and unexpected weight change.   HENT: Negative for congestion, nosebleeds, postnasal drip, rhinorrhea, sinus pressure, sore throat and trouble swallowing.    Eyes: Negative for  photophobia and visual disturbance.   Respiratory: Negative for apnea, cough, chest tightness, shortness of breath and wheezing.    Cardiovascular: Negative for chest pain, palpitations and leg swelling.   Gastrointestinal: Positive for nausea. Negative for abdominal distention, abdominal pain, blood in stool, constipation, diarrhea and vomiting.   Endocrine: Negative for polydipsia, polyphagia and polyuria.   Genitourinary: Negative for difficulty urinating, dysuria, frequency, hematuria and urgency.   Musculoskeletal: Negative for arthralgias, back pain, gait problem, joint swelling and myalgias.   Skin: Negative for pallor, rash and wound.   Neurological: Negative for dizziness, tremors, seizures, syncope, facial asymmetry, speech difficulty, weakness, light-headedness, numbness and headaches.   Psychiatric/Behavioral: Negative for agitation, confusion, hallucinations and sleep disturbance. The patient is not nervous/anxious.    All other systems reviewed and are negative.    Objective:     Vital Signs (Most Recent):  Temp: 97 °F (36.1 °C) (11/10/17 1821)  Pulse: 87 (11/10/17 2016)  Resp: 18 (11/10/17 1821)  BP: (!) 112/48 (11/10/17 2016)  SpO2: 100 % (11/10/17 2016) Vital Signs (24h Range):  Temp:  [97 °F (36.1 °C)] 97 °F (36.1 °C)  Pulse:  [87-92] 87  Resp:  [18] 18  SpO2:  [100 %] 100 %  BP: (104-112)/(48-54) 112/48     Weight: 65.1 kg (143 lb 8.3 oz)  Body mass index is 23.88 kg/m².    Physical Exam   Constitutional: She is oriented to person, place, and time. She appears well-developed and well-nourished. No distress.   HENT:   Head: Normocephalic and atraumatic.   Eyes: Conjunctivae and EOM are normal. Pupils are equal, round, and reactive to light.   Neck: Normal range of motion. Neck supple. No JVD present.   Cardiovascular: Normal rate, regular rhythm, S1 normal, S2 normal and intact distal pulses.  Exam reveals no gallop.    No murmur heard.  Pulmonary/Chest: Effort normal and breath sounds normal. No  respiratory distress. She has no wheezes. She has no rales.   Abdominal: Soft. Bowel sounds are normal. She exhibits ascites. She exhibits no distension. There is no tenderness.   Musculoskeletal: Normal range of motion. She exhibits no tenderness or deformity.   Neurological: She is alert and oriented to person, place, and time. No cranial nerve deficit or sensory deficit.   Skin: Skin is warm and dry. Capillary refill takes less than 2 seconds. No rash noted. No erythema.   Psychiatric: She has a normal mood and affect. Judgment normal.   Nursing note and vitals reviewed.       Significant Labs:   Results for orders placed or performed during the hospital encounter of 11/10/17   CBC W/ AUTO DIFFERENTIAL   Result Value Ref Range    WBC 12.01 3.90 - 12.70 K/uL    RBC 4.11 4.00 - 5.40 M/uL    Hemoglobin 11.8 (L) 12.0 - 16.0 g/dL    Hematocrit 31.8 (L) 37.0 - 48.5 %    MCV 77 (L) 82 - 98 fL    MCH 28.7 27.0 - 31.0 pg    MCHC 37.1 (H) 32.0 - 36.0 g/dL    RDW 23.8 (H) 11.5 - 14.5 %    Platelets 109 (L) 150 - 350 K/uL    MPV SEE COMMENT 9.2 - 12.9 fL    Gran # 11.2 (H) 1.8 - 7.7 K/uL    Lymph # 0.6 (L) 1.0 - 4.8 K/uL    Mono # 0.2 (L) 0.3 - 1.0 K/uL    Eos # 0.0 0.0 - 0.5 K/uL    Baso # 0.01 0.00 - 0.20 K/uL    Gran% 93.3 (H) 38.0 - 73.0 %    Lymph% 5.0 (L) 18.0 - 48.0 %    Mono% 1.9 (L) 4.0 - 15.0 %    Eosinophil% 0.1 0.0 - 8.0 %    Basophil% 0.1 0.0 - 1.9 %    Platelet Estimate Decreased (A)     Aniso Slight     Poik Moderate     Poly Occasional     Hypo Occasional     Ovalocytes Occasional     Target Cells Moderate     Differential Method Automated    Comp. Metabolic Panel   Result Value Ref Range    Sodium 119 (LL) 136 - 145 mmol/L    Potassium 4.4 3.5 - 5.1 mmol/L    Chloride 87 (L) 95 - 110 mmol/L    CO2 21 (L) 23 - 29 mmol/L    Glucose 152 (H) 70 - 110 mg/dL    BUN, Bld 13 6 - 20 mg/dL    Creatinine 0.9 0.5 - 1.4 mg/dL    Calcium 9.3 8.7 - 10.5 mg/dL    Total Protein 7.3 6.0 - 8.4 g/dL    Albumin 2.7 (L) 3.5 - 5.2  g/dL    Total Bilirubin 13.6 (H) 0.1 - 1.0 mg/dL    Alkaline Phosphatase 252 (H) 55 - 135 U/L     (H) 10 - 40 U/L     (H) 10 - 44 U/L    Anion Gap 11 8 - 16 mmol/L    eGFR if African American >60 >60 mL/min/1.73 m^2    eGFR if non African American >60 >60 mL/min/1.73 m^2   Lipase   Result Value Ref Range    Lipase 38 4 - 60 U/L   Urinalysis - Clean Catch   Result Value Ref Range    Specimen UA Urine, Clean Catch     Color, UA Yellow Yellow, Straw, Peace    Appearance, UA Clear Clear    pH, UA 6.0 5.0 - 8.0    Specific Gravity, UA 1.020 1.005 - 1.030    Protein, UA Negative Negative    Glucose, UA Negative Negative    Ketones, UA Negative Negative    Bilirubin (UA) Negative Negative    Occult Blood UA Negative Negative    Nitrite, UA Negative Negative    Urobilinogen, UA Negative <2.0 EU/dL    Leukocytes, UA Negative Negative   Ammonia   Result Value Ref Range    Ammonia 33 10 - 50 umol/L      All pertinent labs within the past 24 hours have been reviewed.    Significant Imaging:   Imaging Results    None        I have reviewed all pertinent imaging results/findings within the past 24 hours.

## 2017-11-11 NOTE — ASSESSMENT & PLAN NOTE
Continue to monitor. Once Na improves will consider restarting diuretics at a lower dose, but may need to continue to hold these.

## 2017-11-11 NOTE — ASSESSMENT & PLAN NOTE
- BP well controlled currently without antihypertensives.  - Will hold home BP meds to prevent hypotension.  Monitor BP trends, and resume as needed.

## 2017-11-11 NOTE — HPI
Pt admitted with symptomatic hyponatremia. She is known to the liver service. She has a hx of EtOH cirrhosis and EtOH hepatitis. She is being treated with prednisone for EtOH hep. She is currently in an EtOH rehab center. Over the last few days she has been feeling lethargic and very weak. Prior to that she was doing well. She has been receiving her prednisone at the facility and has not missed any doses. She has been taking lasix and spironolactone. She is on lactulose and the dose was recently decreased due to diarrhea. Since the decrease in dose she has had minimal diarrhea. She had a paracentesis on 11/9 with removal of 2L of fluid. It does not appear that she received any albumin after the procedure. She is here today for management of hyponatremia. No melena/hematochezia, no confusion, no seizures. No F/C.

## 2017-11-11 NOTE — CONSULTS
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 admitted for weakness and hyponatremia, serum sodium is 118 , was about 126 a month ago , progressive decline in serum sodium levels noted , she had paracentesis for about 2.5 L 3 days ago , her diuretics were held on admission and started in IV fluids,     Past Medical History:   Diagnosis Date    Hypertension     Liver cirrhosis        Past Surgical History:   Procedure Laterality Date    BREAST SURGERY      CHOLECYSTECTOMY      HYSTERECTOMY         Review of patient's allergies indicates:   Allergen Reactions    Ferrous sulfate Other (See Comments)     Patient states the pill makes her sick. She stated she would rather have a shot     Current Facility-Administered Medications   Medication Frequency    0.9%  NaCl infusion Continuous    cetirizine tablet 10 mg Daily    lactulose 20 gram/30 mL solution Soln 10 g TID    ondansetron disintegrating tablet 4 mg Q8H PRN    pantoprazole EC tablet 40 mg Daily    predniSONE tablet 40 mg Daily    ramelteon tablet 8 mg Nightly PRN     Family History     Problem Relation (Age of Onset)    Depression Maternal Grandfather    Diabetes Father, Sister    Hypertension Mother, Father        Social History Main Topics    Smoking status: Never Smoker    Smokeless tobacco: Never Used    Alcohol use Yes      Comment: occasionally    Drug use: No    Sexual activity: Not Currently     Review of Systems   Constitutional: Positive for activity change, appetite change and fatigue. Negative for fever.   HENT: Negative for congestion, facial swelling, sore throat, trouble  swallowing and voice change.    Eyes: Negative for redness and visual disturbance.   Respiratory: Negative for apnea, cough, chest tightness, shortness of breath and wheezing.    Cardiovascular: Negative for chest pain, palpitations and leg swelling.   Gastrointestinal: Positive for abdominal distention and nausea. Negative for abdominal pain, blood in stool, constipation, diarrhea and vomiting.   Genitourinary: Negative for decreased urine volume, difficulty urinating, dysuria, flank pain, frequency, hematuria, pelvic pain and urgency.   Musculoskeletal: Negative for back pain, gait problem and joint swelling.   Skin: Negative for color change and rash.   Neurological: Negative for dizziness, syncope, weakness and headaches.   Hematological: Does not bruise/bleed easily.   Psychiatric/Behavioral: Negative for agitation, behavioral problems and confusion. The patient is not nervous/anxious.      Objective:     Vital Signs (Most Recent):  Temp: 97.9 °F (36.6 °C) (11/11/17 1121)  Pulse: 79 (11/11/17 1121)  Resp: 18 (11/11/17 1121)  BP: (!) 113/59 (11/11/17 1121)  SpO2: 100 % (11/11/17 1121)  O2 Device (Oxygen Therapy): room air (11/11/17 1121) Vital Signs (24h Range):  Temp:  [97 °F (36.1 °C)-97.9 °F (36.6 °C)] 97.9 °F (36.6 °C)  Pulse:  [78-92] 79  Resp:  [17-18] 18  SpO2:  [99 %-100 %] 100 %  BP: (104-115)/(45-59) 113/59     Weight: 65.3 kg (143 lb 15.4 oz) (11/11/17 0346)  Body mass index is 23.96 kg/m².  Body surface area is 1.73 meters squared.    I/O last 3 completed shifts:  In: 515 [I.V.:515]  Out: -     Physical Exam   Constitutional: She is oriented to person, place, and time. She appears well-developed. No distress.   HENT:   Head: Normocephalic and atraumatic.   Mouth/Throat: Oropharynx is clear and moist. No oropharyngeal exudate.   Eyes: EOM are normal. Pupils are equal, round, and reactive to light.   Icteric sclera   Neck: Normal range of motion. Neck supple. No JVD present. Carotid bruit is not  present. No tracheal deviation present. No thyroid mass and no thyromegaly present.   Cardiovascular: Normal rate, regular rhythm, normal heart sounds and intact distal pulses.  Exam reveals no gallop and no friction rub.    No murmur heard.  Pulmonary/Chest: Effort normal and breath sounds normal. No respiratory distress. She has no wheezes. She has no rales. She exhibits no tenderness.   Abdominal: Soft. Bowel sounds are normal. She exhibits distension. She exhibits no abdominal bruit, no ascites and no mass. There is no hepatosplenomegaly. There is no tenderness. There is no rebound, no guarding and no CVA tenderness.   Musculoskeletal: Normal range of motion. She exhibits no edema or tenderness.   Lymphadenopathy:     She has no cervical adenopathy.   Neurological: She is alert and oriented to person, place, and time. She has normal reflexes. She displays normal reflexes. No cranial nerve deficit. She exhibits normal muscle tone. Coordination normal.   Skin: Skin is warm and intact. No rash noted. No erythema. No pallor.       Significant Labs:  CBC:   Recent Labs  Lab 11/11/17 0424   WBC 13.55*   RBC 3.83*   HGB 10.8*   HCT 29.2*   PLT 61*   MCV 76*   MCH 28.2   MCHC 37.0*     CMP:   Recent Labs  Lab 11/10/17  1902  11/11/17  0752   *  < > 102   CALCIUM 9.3  < > 8.9   ALBUMIN 2.7*  --   --    PROT 7.3  --   --    *  < > 118*   K 4.4  < > 4.7   CO2 21*  < > 18*   CL 87*  < > 92*   BUN 13  < > 19   CREATININE 0.9  < > 0.8   ALKPHOS 252*  --   --    *  --   --    *  --   --    BILITOT 13.6*  --   --    < > = values in this interval not displayed.  Coagulation:   Recent Labs  Lab 11/09/17  1357 11/11/17 0424   INR 1.7* 1.7*   APTT 32.2*  --      LFTs:   Recent Labs  Lab 11/10/17  1902   *   *   ALKPHOS 252*   BILITOT 13.6*   PROT 7.3   ALBUMIN 2.7*       Lab Results   Component Value Date    CALCIUM 8.9 11/11/2017    PHOS 3.3 11/08/2017       All labs within the past 24  hours have been reviewed.    Significant Imaging: reviewed     Assessment/Plan:     * Hyponatremia    1. Hyponatremia : due to ESLD , was about 126 a month ago , now at 118 , can be due to dehydration, agree with holding diuretics and gentle hydration ,    can be an indicator of poor prognosis in this woman , will follow labs ,     2. HTN : BP controlled    3. Stable Hb    4. Thrombocytopenia consistent with ESLD             Thank you for your consult. I will follow-up with patient. Please contact us if you have any additional questions.     Total time spent  60 minutes including time needed to review the records,  patient  evaluation, documentation, face-to-face discussion with the patient,  dtr , primary team , more than 50% of the time was spent on coordination of care and counseling.       Luis Enrique Schwab MD  Nephrology  [facility]

## 2017-11-11 NOTE — ED NOTES
Pt resting in bed with family at bedside. NAD noted. AAO x 3. RR e/u, airway open and patent. Will continue to monitor.

## 2017-11-11 NOTE — PLAN OF CARE
Problem: Patient Care Overview  Goal: Plan of Care Review  Outcome: Ongoing (interventions implemented as appropriate)  Remains free from harm, no c/o pain, positions self, no acute distress noted. 24 hour chart check complete.

## 2017-11-11 NOTE — ED NOTES
MD Suggs at bedside discussing admission. Telemetry states to withhold from admission. Telemetry states that they will contact ER when they are ready.

## 2017-11-11 NOTE — PLAN OF CARE
D/C assessment complete.  Pt reports that she currently lives at home with her daughter who assists in her care.  The pt is on currently on liver transplant list and is awaiting a transplant.  Pt states her care is mostly managed by Dr. Joselin Souza.  Pt was recently admitted at [facility] for similar reasons.  She has a rolling walker at home but states she does not know how to use it, and would like to be educated on use. CM request PT/OT work with pt and provide teaching.  At this time pt is unaware of any other needs but CM will continue to follow and assess for needs.      D/C plan: home with family        11/11/17 4839   Discharge Assessment   Assessment Type Discharge Planning Assessment   Confirmed/corrected address and phone number on facesheet? Yes   Assessment information obtained from? Patient;Caregiver;Medical Record   Expected Length of Stay (days) (TBD)   Prior to hospitilization cognitive status: Alert/Oriented   Prior to hospitalization functional status: Needs Assistance;Assistive Equipment   Current cognitive status: Alert/Oriented   Current Functional Status: Assistive Equipment;Needs Assistance   Lives With child(sae), adult   Able to Return to Prior Arrangements yes   Is patient able to care for self after discharge? Yes   Who are your caregiver(s) and their phone number(s)? Reginaldo Bazan, daughter: [phone] (home), [phone] (mobile); Duarte Bazan, son: [phone]   Patient's perception of discharge disposition home or selfcare   Readmission Within The Last 30 Days no previous admission in last 30 days   Patient currently being followed by outpatient case management? No   Patient currently receives any other outside agency services? No   Equipment Currently Used at Home walker, rolling   Do you have any problems affording any of your prescribed medications? No   Is the patient taking medications as prescribed? yes   Does the patient have transportation home? Yes   Transportation  Available family or friend will provide   Does the patient receive services at the [facility]? No   Discharge Plan A Home with family   Discharge Plan B Home with family   Patient/Family In Agreement With Plan yes

## 2017-11-11 NOTE — ED PROVIDER NOTES
SCRIBE #1 NOTE: I, Agata Kramer, am scribing for, and in the presence of, Garret Suggs Jr., MD. I have scribed the entire note.      History      Chief Complaint   Patient presents with    Fatigue     pt sent by Dr. Souza for sodium of 121; pt reports weakness/fatigue        Review of patient's allergies indicates:   Allergen Reactions    Ferrous sulfate Other (See Comments)     Patient states the pill makes her sick. She stated she would rather have a shot        HPI   HPI    11/10/2017, 7:23 PM   History obtained from the patient      History of Present Illness: Marcie Bazan is a 52 y.o. female patient who presents to the Emergency Department for further evaluation following irregular labs. Symptoms are constant and moderate in severity. Pt was sent by Dr. Souza to recheck sodium after sodium was 121 on 11/08/17. No mitigating or exacerbating factors reported. Associated sxs include mild abd pain, fatigue and generalized weakness. Patient denies any fever, chills, nausea, emesis, diarrhea, dizziness, numbness, dysuria, hematuria, hematochezia, constipation, and all other sxs at this time. No further complaints or concerns at this time.         Arrival mode: Personal vehicle      PCP: ODILIA Wall       Past Medical History:  Past Medical History:   Diagnosis Date    Hypertension     Liver cirrhosis        Past Surgical History:  Past Surgical History:   Procedure Laterality Date    BREAST SURGERY      CHOLECYSTECTOMY      HYSTERECTOMY           Family History:  Family History   Problem Relation Age of Onset    Hypertension Mother     Hypertension Father     Diabetes Father     Diabetes Sister     Depression Maternal Grandfather        Social History:  Social History     Social History Main Topics    Smoking status: Never Smoker    Smokeless tobacco: Never Used    Alcohol use Yes      Comment: occasionally    Drug use: No    Sexual activity: Not Currently       ROS   Review of  Systems   Constitutional: Positive for fatigue. Negative for chills and fever.   HENT: Negative for sore throat.    Respiratory: Negative for shortness of breath.    Cardiovascular: Negative for chest pain.   Gastrointestinal: Positive for abdominal pain. Negative for constipation, diarrhea, nausea and vomiting.   Genitourinary: Negative for dysuria and hematuria.   Musculoskeletal: Negative for back pain.   Skin: Negative for rash.   Neurological: Positive for weakness (generalized). Negative for dizziness and numbness.   Hematological: Does not bruise/bleed easily.   All other systems reviewed and are negative.    Physical Exam      Initial Vitals [11/10/17 1821]   BP Pulse Resp Temp SpO2   (!) 104/54 92 18 97 °F (36.1 °C) 100 %      MAP       70.67          Physical Exam  Nursing Notes and Vital Signs Reviewed.  Constitutional: Patient is in no apparent distress. Well-developed and well-nourished. Lethargic.  Head: Atraumatic. Normocephalic.  Eyes: PERRL. EOM intact. Conjunctivae are not pale. Icteric.   ENT: Mucous membranes are moist. Oropharynx is clear and symmetric.    Neck: Supple. Full ROM. No lymphadenopathy.  Cardiovascular: Regular rate. Regular rhythm. No murmurs, rubs, or gallops. Distal pulses are 2+ and symmetric.  Pulmonary/Chest: No respiratory distress. Clear to auscultation bilaterally. No wheezing, rales, or rhonchi.  Abdominal: Abd distension, not tense.  There is no tenderness.  No rebound, guarding, or rigidity.   Genitourinary: No CVA tenderness  Musculoskeletal: Moves all extremities. No obvious deformities. No edema. No calf tenderness.  Skin: Warm and dry.  Neurological:  Alert, awake, and appropriate.  Normal speech.  No acute focal neurological deficits are appreciated.  Psychiatric: Normal affect. Good eye contact. Appropriate in content.    ED Course    Procedures  ED Vital Signs:  Vitals:    11/13/17 0121 11/13/17 0340 11/13/17 0341 11/13/17 0552   BP:  (!) 116/57     Pulse: 82 83  83 81   Resp:  18     Temp:  98.1 °F (36.7 °C)     TempSrc:  Oral     SpO2:  98%     Weight:  72 kg (158 lb 11.7 oz)  72 kg (158 lb 11.7 oz)   Height:        11/13/17 0827 11/13/17 1127 11/13/17 1612 11/13/17 1953   BP: (!) 117/58 (!) 121/56 129/62 132/86   Pulse: 89 92 86 89   Resp: 18 20 18 18   Temp: 98 °F (36.7 °C) 98.5 °F (36.9 °C) 98.5 °F (36.9 °C) 98.3 °F (36.8 °C)   TempSrc: Oral Oral Oral    SpO2: 99% 98% 96% 97%   Weight:       Height:        11/13/17 2151 11/13/17 2311 11/14/17 0004 11/14/17 0108   BP:   (!) 126/58    Pulse: 87 87 82 80   Resp:   16    Temp:   98.2 °F (36.8 °C)    TempSrc:   Oral    SpO2:   97%    Weight:       Height:        11/14/17 0304 11/14/17 0411 11/14/17 0526   BP:  (!) 122/58    Pulse: 82 86 80   Resp:  16    Temp:  97.8 °F (36.6 °C)    TempSrc:  Oral    SpO2:  97%    Weight:  72 kg (158 lb 11.7 oz)    Height:          Abnormal Lab Results:  Labs Reviewed   CBC W/ AUTO DIFFERENTIAL - Abnormal; Notable for the following:        Result Value    Hemoglobin 11.8 (*)     Hematocrit 31.8 (*)     MCV 77 (*)     MCHC 37.1 (*)     RDW 23.8 (*)     Platelets 109 (*)     Gran # 11.2 (*)     Lymph # 0.6 (*)     Mono # 0.2 (*)     Gran% 93.3 (*)     Lymph% 5.0 (*)     Mono% 1.9 (*)     Platelet Estimate Decreased (*)     All other components within normal limits   COMPREHENSIVE METABOLIC PANEL - Abnormal; Notable for the following:     Sodium 119 (*)     Chloride 87 (*)     CO2 21 (*)     Glucose 152 (*)     Albumin 2.7 (*)     Total Bilirubin 13.6 (*)     Alkaline Phosphatase 252 (*)      (*)      (*)     All other components within normal limits    Narrative:     NA critical result(s) called and verbal readback obtained from   Sarah El RN, 11/10/2017 19:42   LIPASE   URINALYSIS   AMMONIA   OSMOLALITY, URINE RANDOM   SODIUM, URINE, RANDOM        All Lab Results:  Results for orders placed or performed during the hospital encounter of 11/10/17   CBC W/ AUTO DIFFERENTIAL    Result Value Ref Range    WBC 12.01 3.90 - 12.70 K/uL    RBC 4.11 4.00 - 5.40 M/uL    Hemoglobin 11.8 (L) 12.0 - 16.0 g/dL    Hematocrit 31.8 (L) 37.0 - 48.5 %    MCV 77 (L) 82 - 98 fL    MCH 28.7 27.0 - 31.0 pg    MCHC 37.1 (H) 32.0 - 36.0 g/dL    RDW 23.8 (H) 11.5 - 14.5 %    Platelets 109 (L) 150 - 350 K/uL    MPV SEE COMMENT 9.2 - 12.9 fL    Gran # 11.2 (H) 1.8 - 7.7 K/uL    Lymph # 0.6 (L) 1.0 - 4.8 K/uL    Mono # 0.2 (L) 0.3 - 1.0 K/uL    Eos # 0.0 0.0 - 0.5 K/uL    Baso # 0.01 0.00 - 0.20 K/uL    Gran% 93.3 (H) 38.0 - 73.0 %    Lymph% 5.0 (L) 18.0 - 48.0 %    Mono% 1.9 (L) 4.0 - 15.0 %    Eosinophil% 0.1 0.0 - 8.0 %    Basophil% 0.1 0.0 - 1.9 %    Platelet Estimate Decreased (A)     Aniso Slight     Poik Moderate     Poly Occasional     Hypo Occasional     Ovalocytes Occasional     Target Cells Moderate     Differential Method Automated    Comp. Metabolic Panel   Result Value Ref Range    Sodium 119 (LL) 136 - 145 mmol/L    Potassium 4.4 3.5 - 5.1 mmol/L    Chloride 87 (L) 95 - 110 mmol/L    CO2 21 (L) 23 - 29 mmol/L    Glucose 152 (H) 70 - 110 mg/dL    BUN, Bld 13 6 - 20 mg/dL    Creatinine 0.9 0.5 - 1.4 mg/dL    Calcium 9.3 8.7 - 10.5 mg/dL    Total Protein 7.3 6.0 - 8.4 g/dL    Albumin 2.7 (L) 3.5 - 5.2 g/dL    Total Bilirubin 13.6 (H) 0.1 - 1.0 mg/dL    Alkaline Phosphatase 252 (H) 55 - 135 U/L     (H) 10 - 40 U/L     (H) 10 - 44 U/L    Anion Gap 11 8 - 16 mmol/L    eGFR if African American >60 >60 mL/min/1.73 m^2    eGFR if non African American >60 >60 mL/min/1.73 m^2   Lipase   Result Value Ref Range    Lipase 38 4 - 60 U/L   Urinalysis - Clean Catch   Result Value Ref Range    Specimen UA Urine, Clean Catch     Color, UA Yellow Yellow, Straw, Peace    Appearance, UA Clear Clear    pH, UA 6.0 5.0 - 8.0    Specific Gravity, UA 1.020 1.005 - 1.030    Protein, UA Negative Negative    Glucose, UA Negative Negative    Ketones, UA Negative Negative    Bilirubin (UA) Negative Negative    Occult  Blood UA Negative Negative    Nitrite, UA Negative Negative    Urobilinogen, UA Negative <2.0 EU/dL    Leukocytes, UA Negative Negative   Ammonia   Result Value Ref Range    Ammonia 33 10 - 50 umol/L   Osmolality, urine   Result Value Ref Range    Osmolality, Ur 415 50 - 1200 mOsm/kg   Sodium, urine, random   Result Value Ref Range    Sodium Urine Random 75 20 - 250 mmol/L   Basic metabolic panel   Result Value Ref Range    Sodium 118 (LL) 136 - 145 mmol/L    Potassium 4.7 3.5 - 5.1 mmol/L    Chloride 89 (L) 95 - 110 mmol/L    CO2 19 (L) 23 - 29 mmol/L    Glucose 126 (H) 70 - 110 mg/dL    BUN, Bld 16 6 - 20 mg/dL    Creatinine 1.1 0.5 - 1.4 mg/dL    Calcium 9.2 8.7 - 10.5 mg/dL    Anion Gap 10 8 - 16 mmol/L    eGFR if African American >60 >60 mL/min/1.73 m^2    eGFR if non African American 57 (A) >60 mL/min/1.73 m^2   Magnesium   Result Value Ref Range    Magnesium 1.5 (L) 1.6 - 2.6 mg/dL   CBC auto differential   Result Value Ref Range    WBC 13.55 (H) 3.90 - 12.70 K/uL    RBC 3.83 (L) 4.00 - 5.40 M/uL    Hemoglobin 10.8 (L) 12.0 - 16.0 g/dL    Hematocrit 29.2 (L) 37.0 - 48.5 %    MCV 76 (L) 82 - 98 fL    MCH 28.2 27.0 - 31.0 pg    MCHC 37.0 (H) 32.0 - 36.0 g/dL    RDW 23.6 (H) 11.5 - 14.5 %    Platelets 61 (L) 150 - 350 K/uL    MPV SEE COMMENT 9.2 - 12.9 fL    Gran # 11.3 (H) 1.8 - 7.7 K/uL    Lymph # 1.3 1.0 - 4.8 K/uL    Mono # 1.0 0.3 - 1.0 K/uL    Eos # 0.0 0.0 - 0.5 K/uL    Baso # 0.03 0.00 - 0.20 K/uL    Gran% 83.6 (H) 38.0 - 73.0 %    Lymph% 9.2 (L) 18.0 - 48.0 %    Mono% 7.2 4.0 - 15.0 %    Eosinophil% 0.1 0.0 - 8.0 %    Basophil% 0.2 0.0 - 1.9 %    Platelet Estimate Decreased (A)     Aniso Slight     Poik Slight     Poly Occasional     Hypo Moderate     Ovalocytes Occasional     Target Cells Moderate     Tear Drop Cells Occasional     Schistocytes Present     Differential Method Automated    Protime-INR   Result Value Ref Range    Prothrombin Time 17.5 (H) 9.0 - 12.5 sec    INR 1.7 (H) 0.8 - 1.2   Uric  acid   Result Value Ref Range    Uric Acid 4.0 2.4 - 5.7 mg/dL   Basic metabolic panel   Result Value Ref Range    Sodium 118 (LL) 136 - 145 mmol/L    Potassium 4.7 3.5 - 5.1 mmol/L    Chloride 92 (L) 95 - 110 mmol/L    CO2 18 (L) 23 - 29 mmol/L    Glucose 102 70 - 110 mg/dL    BUN, Bld 19 6 - 20 mg/dL    Creatinine 0.8 0.5 - 1.4 mg/dL    Calcium 8.9 8.7 - 10.5 mg/dL    Anion Gap 8 8 - 16 mmol/L    eGFR if African American >60 >60 mL/min/1.73 m^2    eGFR if non African American >60 >60 mL/min/1.73 m^2   Basic metabolic panel   Result Value Ref Range    Sodium 118 (LL) 136 - 145 mmol/L    Potassium 4.5 3.5 - 5.1 mmol/L    Chloride 91 (L) 95 - 110 mmol/L    CO2 18 (L) 23 - 29 mmol/L    Glucose 116 (H) 70 - 110 mg/dL    BUN, Bld 17 6 - 20 mg/dL    Creatinine 0.7 0.5 - 1.4 mg/dL    Calcium 9.3 8.7 - 10.5 mg/dL    Anion Gap 9 8 - 16 mmol/L    eGFR if African American >60 >60 mL/min/1.73 m^2    eGFR if non African American >60 >60 mL/min/1.73 m^2   Basic metabolic panel   Result Value Ref Range    Sodium 118 (LL) 136 - 145 mmol/L    Potassium 4.6 3.5 - 5.1 mmol/L    Chloride 92 (L) 95 - 110 mmol/L    CO2 18 (L) 23 - 29 mmol/L    Glucose 115 (H) 70 - 110 mg/dL    BUN, Bld 12 6 - 20 mg/dL    Creatinine 0.6 0.5 - 1.4 mg/dL    Calcium 8.5 (L) 8.7 - 10.5 mg/dL    Anion Gap 8 8 - 16 mmol/L    eGFR if African American >60 >60 mL/min/1.73 m^2    eGFR if non African American >60 >60 mL/min/1.73 m^2   CBC auto differential   Result Value Ref Range    WBC 13.79 (H) 3.90 - 12.70 K/uL    RBC 3.97 (L) 4.00 - 5.40 M/uL    Hemoglobin 11.4 (L) 12.0 - 16.0 g/dL    Hematocrit 30.4 (L) 37.0 - 48.5 %    MCV 77 (L) 82 - 98 fL    MCH 28.7 27.0 - 31.0 pg    MCHC 37.5 (H) 32.0 - 36.0 g/dL    RDW 23.5 (H) 11.5 - 14.5 %    Platelets 57 (L) 150 - 350 K/uL    MPV SEE COMMENT 9.2 - 12.9 fL    Gran # 10.1 (H) 1.8 - 7.7 K/uL    Lymph # 2.6 1.0 - 4.8 K/uL    Mono # 1.0 0.3 - 1.0 K/uL    Eos # 0.1 0.0 - 0.5 K/uL    Baso # 0.02 0.00 - 0.20 K/uL    Gran%  73.2 (H) 38.0 - 73.0 %    Lymph% 18.9 18.0 - 48.0 %    Mono% 7.3 4.0 - 15.0 %    Eosinophil% 0.8 0.0 - 8.0 %    Basophil% 0.1 0.0 - 1.9 %    Platelet Estimate Decreased (A)     Aniso Moderate     Poik Slight     Poly Occasional     Hypo Moderate     Ovalocytes Occasional     Target Cells Moderate     Stomatocytes Present     Differential Method Automated    Protime-INR   Result Value Ref Range    Prothrombin Time 18.2 (H) 9.0 - 12.5 sec    INR 1.8 (H) 0.8 - 1.2   Basic metabolic panel   Result Value Ref Range    Sodium 119 (LL) 136 - 145 mmol/L    Potassium 4.2 3.5 - 5.1 mmol/L    Chloride 92 (L) 95 - 110 mmol/L    CO2 20 (L) 23 - 29 mmol/L    Glucose 110 70 - 110 mg/dL    BUN, Bld 13 6 - 20 mg/dL    Creatinine 0.7 0.5 - 1.4 mg/dL    Calcium 8.6 (L) 8.7 - 10.5 mg/dL    Anion Gap 7 (L) 8 - 16 mmol/L    eGFR if African American >60 >60 mL/min/1.73 m^2    eGFR if non African American >60 >60 mL/min/1.73 m^2   Basic metabolic panel   Result Value Ref Range    Sodium 119 (LL) 136 - 145 mmol/L    Potassium 4.3 3.5 - 5.1 mmol/L    Chloride 94 (L) 95 - 110 mmol/L    CO2 18 (L) 23 - 29 mmol/L    Glucose 152 (H) 70 - 110 mg/dL    BUN, Bld 12 6 - 20 mg/dL    Creatinine 0.7 0.5 - 1.4 mg/dL    Calcium 8.3 (L) 8.7 - 10.5 mg/dL    Anion Gap 7 (L) 8 - 16 mmol/L    eGFR if African American >60 >60 mL/min/1.73 m^2    eGFR if non African American >60 >60 mL/min/1.73 m^2   Basic metabolic panel   Result Value Ref Range    Sodium 120 (L) 136 - 145 mmol/L    Potassium 4.4 3.5 - 5.1 mmol/L    Chloride 96 95 - 110 mmol/L    CO2 18 (L) 23 - 29 mmol/L    Glucose 114 (H) 70 - 110 mg/dL    BUN, Bld 9 6 - 20 mg/dL    Creatinine 0.6 0.5 - 1.4 mg/dL    Calcium 8.2 (L) 8.7 - 10.5 mg/dL    Anion Gap 6 (L) 8 - 16 mmol/L    eGFR if African American >60 >60 mL/min/1.73 m^2    eGFR if non African American >60 >60 mL/min/1.73 m^2   CBC auto differential   Result Value Ref Range    WBC 12.25 3.90 - 12.70 K/uL    RBC 3.63 (L) 4.00 - 5.40 M/uL     Hemoglobin 10.3 (L) 12.0 - 16.0 g/dL    Hematocrit 28.2 (L) 37.0 - 48.5 %    MCV 78 (L) 82 - 98 fL    MCH 28.4 27.0 - 31.0 pg    MCHC 36.5 (H) 32.0 - 36.0 g/dL    RDW 23.9 (H) 11.5 - 14.5 %    Platelets 52 (L) 150 - 350 K/uL    MPV SEE COMMENT 9.2 - 12.9 fL    Gran # 8.1 (H) 1.8 - 7.7 K/uL    Lymph # 2.6 1.0 - 4.8 K/uL    Mono # 1.4 (H) 0.3 - 1.0 K/uL    Eos # 0.1 0.0 - 0.5 K/uL    Baso # 0.03 0.00 - 0.20 K/uL    Gran% 66.5 38.0 - 73.0 %    Lymph% 21.0 18.0 - 48.0 %    Mono% 11.7 4.0 - 15.0 %    Eosinophil% 0.8 0.0 - 8.0 %    Basophil% 0.2 0.0 - 1.9 %    Differential Method Automated    Protime-INR   Result Value Ref Range    Prothrombin Time 19.1 (H) 9.0 - 12.5 sec    INR 1.9 (H) 0.8 - 1.2   Hepatic function panel   Result Value Ref Range    Total Protein 5.6 (L) 6.0 - 8.4 g/dL    Albumin 2.2 (L) 3.5 - 5.2 g/dL    Total Bilirubin 11.5 (H) 0.1 - 1.0 mg/dL    Bilirubin, Direct 6.6 (H) 0.1 - 0.3 mg/dL     (H) 10 - 40 U/L     (H) 10 - 44 U/L    Alkaline Phosphatase 219 (H) 55 - 135 U/L   Basic metabolic panel   Result Value Ref Range    Sodium 120 (L) 136 - 145 mmol/L    Potassium 4.2 3.5 - 5.1 mmol/L    Chloride 96 95 - 110 mmol/L    CO2 16 (L) 23 - 29 mmol/L    Glucose 95 70 - 110 mg/dL    BUN, Bld 9 6 - 20 mg/dL    Creatinine 0.6 0.5 - 1.4 mg/dL    Calcium 8.1 (L) 8.7 - 10.5 mg/dL    Anion Gap 8 8 - 16 mmol/L    eGFR if African American >60 >60 mL/min/1.73 m^2    eGFR if non African American >60 >60 mL/min/1.73 m^2   Basic metabolic panel   Result Value Ref Range    Sodium 120 (L) 136 - 145 mmol/L    Potassium 4.4 3.5 - 5.1 mmol/L    Chloride 95 95 - 110 mmol/L    CO2 18 (L) 23 - 29 mmol/L    Glucose 131 (H) 70 - 110 mg/dL    BUN, Bld 8 6 - 20 mg/dL    Creatinine 0.6 0.5 - 1.4 mg/dL    Calcium 8.4 (L) 8.7 - 10.5 mg/dL    Anion Gap 7 (L) 8 - 16 mmol/L    eGFR if African American >60 >60 mL/min/1.73 m^2    eGFR if non African American >60 >60 mL/min/1.73 m^2   Basic metabolic panel   Result Value Ref  Range    Sodium 121 (L) 136 - 145 mmol/L    Potassium 4.2 3.5 - 5.1 mmol/L    Chloride 96 95 - 110 mmol/L    CO2 18 (L) 23 - 29 mmol/L    Glucose 118 (H) 70 - 110 mg/dL    BUN, Bld 8 6 - 20 mg/dL    Creatinine 0.6 0.5 - 1.4 mg/dL    Calcium 8.5 (L) 8.7 - 10.5 mg/dL    Anion Gap 7 (L) 8 - 16 mmol/L    eGFR if African American >60 >60 mL/min/1.73 m^2    eGFR if non African American >60 >60 mL/min/1.73 m^2   Protime-INR   Result Value Ref Range    Prothrombin Time 18.8 (H) 9.0 - 12.5 sec    INR 1.9 (H) 0.8 - 1.2              The Emergency Provider reviewed the vital signs and test results, which are outlined above.    ED Discussion   8:33 PM: Dr. Suggs discussed the pt's case with Dr. Schwab (Nephrology) who recommends continuing IV fluids at 75 cc/hr and BMP every 8 hours.    8:37 PM: Discussed case with Keegan ([facility]). Dr. Allison agrees with current care and management of pt and accepts admission.   Admitting Service: Hospital medicine   Admitting Physician: Keegan  Admit to: In-patient Telemetry     8:45 PM: Re-evaluated pt. I have discussed test results, shared treatment plan, and the need for admission with patient and family at bedside. Pt and family express understanding at this time and agree with all information. All questions answered. Pt and family have no further questions or concerns at this time. Pt is ready for admit.      ED Medication(s):  Medications   pantoprazole EC tablet 40 mg (40 mg Oral Given 11/13/17 0828)   ondansetron disintegrating tablet 4 mg (4 mg Oral Given 11/14/17 0535)   cetirizine tablet 10 mg (10 mg Oral Given 11/13/17 0828)   predniSONE tablet 40 mg (40 mg Oral Given 11/13/17 0828)   ramelteon tablet 8 mg (not administered)   lactulose 20 gram/30 mL solution Soln 15 g (15 g Oral Given 11/14/17 0533)   0.9%  NaCl infusion (0 mLs Intravenous Stopped 11/10/17 4666)   albumin human 25% bottle 25 g (25 g Intravenous New Bag 11/11/17 1120)   magnesium sulfate in dextrose  IVPB (premix) 1 g (1 g Intravenous New Bag 11/11/17 1710)       Current Discharge Medication List                Medical Decision Making    Medical Decision Making:   Clinical Tests:   Lab Tests: Reviewed and Ordered           Scribe Attestation:   Scribe #1: I performed the above scribed service and the documentation accurately describes the services I performed. I attest to the accuracy of the note.    Attending:   Physician Attestation Statement for Scribe #1: I, Garret Suggs Jr., MD, personally performed the services described in this documentation, as scribed by Agata Kramer, in my presence, and it is both accurate and complete.          Clinical Impression       ICD-10-CM ICD-9-CM   1. Hyponatremia E87.1 276.1   2. Alcoholic hepatitis with ascites K70.11 571.1   3. Other ascites R18.8 789.59   4. Essential hypertension I10 401.9       Disposition:   Disposition: Admitted  Condition: Fair         Garret Suggs Jr., MD  11/14/17 0738

## 2017-11-11 NOTE — ED NOTES
Telemetry contacted in regards to admission. Telemetry RN states that he is at his limit with pt's. Charge nurse Celio notified. NAD noted. AAO x 3. RR e/u, airway open and patent. Will continue to monitor.

## 2017-11-11 NOTE — H&P
[facility] Hospital Medicine  History & Physical    Patient Name: Marcie Bazan  MRN: [id number]  Admission Date: 11/10/2017  Attending Physician: Stevenson Allison MD  Primary Care Provider: ODILIA Wall         Patient information was obtained from patient, past medical records and ER records.     Subjective:     Principal Problem:Hyponatremia    Chief Complaint:   Chief Complaint   Patient presents with    Fatigue     pt sent by Dr. Souza for sodium of 121; pt reports weakness/fatigue         HPI: Ms. Bazan is a 53 yo female  with a PMHx of alcoholic cirrhosis with ESLD (MELD score of 29) on liver transplant list, who was seen by Dr. Souza in clinic on 11/8 for nausea and fatigue.  She underwent therapeutic paracentesis 11/9, draining 2L.  At that time, labs were drawn and resulted a critical Na of 121.  Subsequently, she was instructed to come to ED for repeat labs and further evaluation.  Initial work-up in ED resulted Na 119, , , ammonia 33, INR 1.7, H&H 11.8/31.8, plt 109; VSS.  Hsopital Medicine was consulted for admission.  Currently, patient appears comfortable, in NAD.  Denies any nausea currently, but reports fatigue persists.  Denies any lightheadedness/dizziness, syncope, AMS, visual disturbances, focal deficits, numbness/tingling, HA, seizure-like activity, chest pain, palpitations, SOB, cough, orthopnea, PND, edema, weight gain, ABD pain, N/V/D, dysuria, hematuria, fever, or chills.  Case discussed with Nephrology, who recommend 0.9% NS @ 75 mL/hr with BMP Q 8 hours.    Past Medical History:   Diagnosis Date    Hypertension     Liver cirrhosis        Past Surgical History:   Procedure Laterality Date    BREAST SURGERY      CHOLECYSTECTOMY      HYSTERECTOMY         Review of patient's allergies indicates:   Allergen Reactions    Ferrous sulfate Other (See Comments)     Patient states the pill makes her sick. She stated she would rather have a shot        No current facility-administered medications on file prior to encounter.      Current Outpatient Prescriptions on File Prior to Encounter   Medication Sig    cetirizine (ZYRTEC) 10 MG tablet Take 10 mg by mouth once daily.    furosemide (LASIX) 40 MG tablet Take 1 tablet (40 mg total) by mouth once daily.    lisinopril (PRINIVIL,ZESTRIL) 5 MG tablet Take 1 tablet by mouth only as needed for high blood pressure    omeprazole (PRILOSEC) 40 MG capsule Take 40 mg by mouth once daily.    ondansetron (ZOFRAN-ODT) 4 MG TbDL Take 1 tablet (4 mg total) by mouth every 8 (eight) hours as needed (nausea and vomiting).    potassium chloride SA (K-DUR,KLOR-CON) 20 MEQ tablet Take 1 mEq by mouth 2 (two) times daily.    predniSONE (DELTASONE) 20 MG tablet Take 2 tablets (40 mg total) by mouth once daily.    spironolactone (ALDACTONE) 100 MG tablet Take 3 tablets (300 mg total) by mouth once daily.     Family History     Problem Relation (Age of Onset)    Depression Maternal Grandfather    Diabetes Father, Sister    Hypertension Mother, Father        Social History Main Topics    Smoking status: Never Smoker    Smokeless tobacco: Never Used    Alcohol use Yes      Comment: occasionally    Drug use: No    Sexual activity: Not Currently     Review of Systems   Constitutional: Positive for fatigue. Negative for activity change, chills, diaphoresis, fever and unexpected weight change.   HENT: Negative for congestion, nosebleeds, postnasal drip, rhinorrhea, sinus pressure, sore throat and trouble swallowing.    Eyes: Negative for photophobia and visual disturbance.   Respiratory: Negative for apnea, cough, chest tightness, shortness of breath and wheezing.    Cardiovascular: Negative for chest pain, palpitations and leg swelling.   Gastrointestinal: Positive for nausea. Negative for abdominal distention, abdominal pain, blood in stool, constipation, diarrhea and vomiting.   Endocrine: Negative for polydipsia,  polyphagia and polyuria.   Genitourinary: Negative for difficulty urinating, dysuria, frequency, hematuria and urgency.   Musculoskeletal: Negative for arthralgias, back pain, gait problem, joint swelling and myalgias.   Skin: Negative for pallor, rash and wound.   Neurological: Negative for dizziness, tremors, seizures, syncope, facial asymmetry, speech difficulty, weakness, light-headedness, numbness and headaches.   Psychiatric/Behavioral: Negative for agitation, confusion, hallucinations and sleep disturbance. The patient is not nervous/anxious.    All other systems reviewed and are negative.    Objective:     Vital Signs (Most Recent):  Temp: 97 °F (36.1 °C) (11/10/17 1821)  Pulse: 87 (11/10/17 2016)  Resp: 18 (11/10/17 1821)  BP: (!) 112/48 (11/10/17 2016)  SpO2: 100 % (11/10/17 2016) Vital Signs (24h Range):  Temp:  [97 °F (36.1 °C)] 97 °F (36.1 °C)  Pulse:  [87-92] 87  Resp:  [18] 18  SpO2:  [100 %] 100 %  BP: (104-112)/(48-54) 112/48     Weight: 65.1 kg (143 lb 8.3 oz)  Body mass index is 23.88 kg/m².    Physical Exam   Constitutional: She is oriented to person, place, and time. She appears well-developed and well-nourished. No distress.   HENT:   Head: Normocephalic and atraumatic.   Eyes: Conjunctivae and EOM are normal. Pupils are equal, round, and reactive to light.   Neck: Normal range of motion. Neck supple. No JVD present.   Cardiovascular: Normal rate, regular rhythm, S1 normal, S2 normal and intact distal pulses.  Exam reveals no gallop.    No murmur heard.  Pulmonary/Chest: Effort normal and breath sounds normal. No respiratory distress. She has no wheezes. She has no rales.   Abdominal: Soft. Bowel sounds are normal. She exhibits ascites. She exhibits no distension. There is no tenderness.   Musculoskeletal: Normal range of motion. She exhibits no tenderness or deformity.   Neurological: She is alert and oriented to person, place, and time. No cranial nerve deficit or sensory deficit.   Skin:  Skin is warm and dry. Capillary refill takes less than 2 seconds. No rash noted. No erythema.   Psychiatric: She has a normal mood and affect. Judgment normal.   Nursing note and vitals reviewed.       Significant Labs:   Results for orders placed or performed during the hospital encounter of 11/10/17   CBC W/ AUTO DIFFERENTIAL   Result Value Ref Range    WBC 12.01 3.90 - 12.70 K/uL    RBC 4.11 4.00 - 5.40 M/uL    Hemoglobin 11.8 (L) 12.0 - 16.0 g/dL    Hematocrit 31.8 (L) 37.0 - 48.5 %    MCV 77 (L) 82 - 98 fL    MCH 28.7 27.0 - 31.0 pg    MCHC 37.1 (H) 32.0 - 36.0 g/dL    RDW 23.8 (H) 11.5 - 14.5 %    Platelets 109 (L) 150 - 350 K/uL    MPV SEE COMMENT 9.2 - 12.9 fL    Gran # 11.2 (H) 1.8 - 7.7 K/uL    Lymph # 0.6 (L) 1.0 - 4.8 K/uL    Mono # 0.2 (L) 0.3 - 1.0 K/uL    Eos # 0.0 0.0 - 0.5 K/uL    Baso # 0.01 0.00 - 0.20 K/uL    Gran% 93.3 (H) 38.0 - 73.0 %    Lymph% 5.0 (L) 18.0 - 48.0 %    Mono% 1.9 (L) 4.0 - 15.0 %    Eosinophil% 0.1 0.0 - 8.0 %    Basophil% 0.1 0.0 - 1.9 %    Platelet Estimate Decreased (A)     Aniso Slight     Poik Moderate     Poly Occasional     Hypo Occasional     Ovalocytes Occasional     Target Cells Moderate     Differential Method Automated    Comp. Metabolic Panel   Result Value Ref Range    Sodium 119 (LL) 136 - 145 mmol/L    Potassium 4.4 3.5 - 5.1 mmol/L    Chloride 87 (L) 95 - 110 mmol/L    CO2 21 (L) 23 - 29 mmol/L    Glucose 152 (H) 70 - 110 mg/dL    BUN, Bld 13 6 - 20 mg/dL    Creatinine 0.9 0.5 - 1.4 mg/dL    Calcium 9.3 8.7 - 10.5 mg/dL    Total Protein 7.3 6.0 - 8.4 g/dL    Albumin 2.7 (L) 3.5 - 5.2 g/dL    Total Bilirubin 13.6 (H) 0.1 - 1.0 mg/dL    Alkaline Phosphatase 252 (H) 55 - 135 U/L     (H) 10 - 40 U/L     (H) 10 - 44 U/L    Anion Gap 11 8 - 16 mmol/L    eGFR if African American >60 >60 mL/min/1.73 m^2    eGFR if non African American >60 >60 mL/min/1.73 m^2   Lipase   Result Value Ref Range    Lipase 38 4 - 60 U/L   Urinalysis - Clean Catch   Result  Value Ref Range    Specimen UA Urine, Clean Catch     Color, UA Yellow Yellow, Straw, Peace    Appearance, UA Clear Clear    pH, UA 6.0 5.0 - 8.0    Specific Gravity, UA 1.020 1.005 - 1.030    Protein, UA Negative Negative    Glucose, UA Negative Negative    Ketones, UA Negative Negative    Bilirubin (UA) Negative Negative    Occult Blood UA Negative Negative    Nitrite, UA Negative Negative    Urobilinogen, UA Negative <2.0 EU/dL    Leukocytes, UA Negative Negative   Ammonia   Result Value Ref Range    Ammonia 33 10 - 50 umol/L      All pertinent labs within the past 24 hours have been reviewed.    Significant Imaging:   Imaging Results    None        I have reviewed all pertinent imaging results/findings within the past 24 hours.        Assessment/Plan:     * Hyponatremia    - Will admit to Inpatient.  - Currently, asymptomatic.  - Case discussed with Nephrology.  - 0.9% NS @ 75 mL/hr.  - BMP Q8 hours.  - Nephrology to evaluate in AM.        Alcoholic hepatitis with end-stage liver disease    - MELD score of 29.  On liver transplant list.  - Continue home lactulose.  - LFT's increased from baseline at 232/199.  - Ammonia WNL at 33.  - Consult Dr. Souza/GI in AM.  - Avoid hepatotoxic agents.        Coagulopathy    - Secondary to ESLD.  - No evidence of active bleeding.  - INR/platelets stable.  Monitor.        Essential hypertension    - BP well controlled currently without antihypertensives.  - Will hold home BP meds to prevent hypotension.  Monitor BP trends, and resume as needed.          VTE Risk Mitigation     None             ODILIA Wisdom  Department of Hospital Medicine   [facility]

## 2017-11-11 NOTE — HPI
Marcie Bazan is a pleasant 52 y old AA woman with h/o ESLD from alcoholic hepatitis admitted for weakness and hyponatremia, serum sodium is 118 , was about 126 a month ago , progressive decline in serum sodium levels noted , she had paracentesis for about 2.5 L 3 days ago , her diuretics were held on admission and started in IV fluids,

## 2017-11-11 NOTE — ASSESSMENT & PLAN NOTE
Continue prednisone. Liver function poor but stable.    MELD-Na score: 29 at 11/11/2017  7:52 AM  MELD score: 22 at 11/11/2017  7:52 AM  Calculated from:  Serum Creatinine: 0.8 mg/dL (Rounded to 1) at 11/11/2017  7:52 AM  Serum Sodium: 118 mmol/L (Rounded to 125) at 11/11/2017  7:52 AM  Total Bilirubin: 13.6 mg/dL at 11/10/2017  7:02 PM  INR(ratio): 1.7 at 11/11/2017  4:24 AM  Age: 52 years

## 2017-11-11 NOTE — ED NOTES
Pt is currently awaiting a liver transplant. Pt informed by Harley to come to ER due to sodium 121.

## 2017-11-11 NOTE — HPI
Ms. Bazan is a 51 yo female with a PMHx of alcoholic cirrhosis with ESLD (MELD score of 29) on liver transplant list, who was seen by Dr. Souza in clinic on 11/8 for nausea and fatigue.  She underwent therapeutic paracentesis 11/9, draining 2L.  At that time, labs were drawn and resulted a critical Na of 121.  Subsequently, she was instructed to come to ED for repeat labs and further evaluation.  Initial work-up in ED resulted Na 119, , , ammonia 33, INR 1.7, H&H 11.8/31.8, plt 109; VSS.  Hsopital Medicine was consulted for admission.  Currently, patient appears comfortable, in NAD.  Denies any nausea currently, but reports fatigue persists.  Denies any lightheadedness/dizziness, syncope, AMS, visual disturbances, focal deficits, numbness/tingling, HA, seizure-like activity, chest pain, palpitations, SOB, cough, orthopnea, PND, edema, weight gain, ABD pain, N/V/D, dysuria, hematuria, fever, or chills.  Case discussed with Nephrology, who recommend 0.9% NS @ 75 mL/hr with BMP Q 8 hours.

## 2017-11-11 NOTE — ASSESSMENT & PLAN NOTE
- Will admit to Inpatient.  - Currently, asymptomatic.  - Case discussed with Nephrology.  - 0.9% NS @ 75 mL/hr.  - BMP Q8 hours.  - Nephrology to evaluate in AM.

## 2017-11-11 NOTE — ASSESSMENT & PLAN NOTE
1. Hyponatremia : due to ESLD , was about 126 a month ago , now at 118 , can be due to dehydration, agree with holding diuretics and gentle hydration ,    can be an indicator of poor prognosis in this woman , will follow labs ,     2. HTN : BP controlled    3. Stable Hb    4. Thrombocytopenia consistent with ESLD

## 2017-11-11 NOTE — CONSULTS
[facility]   Gastroenterology  Consult Note    Patient Name: Marcie Bazan  MRN: [id number]  Admission Date: 11/10/2017  Hospital Length of Stay: 1 days  Code Status: Full Code   Attending Provider: Stevenson Allison MD   Consulting Provider: Andrew Garcia MD  Primary Care Physician: ODILIA Wall  Principal Problem:Hyponatremia    Inpatient consult to Gastroenterology  Consult performed by: ANDREW GARCIA  Consult ordered by: SHIRA MILES  Reason for consult: Hyponatremia/Cirrhosis        Subjective:     HPI:  Pt admitted with symptomatic hyponatremia. She is known to the liver service. She has a hx of EtOH cirrhosis and EtOH hepatitis. She is being treated with prednisone for EtOH hep. She is currently in an EtOH rehab center. Over the last few days she has been feeling lethargic and very weak. Prior to that she was doing well. She has been receiving her prednisone at the facility and has not missed any doses. She has been taking lasix and spironolactone. She is on lactulose and the dose was recently decreased due to diarrhea. Since the decrease in dose she has had minimal diarrhea. She had a paracentesis on 11/9 with removal of 2L of fluid. It does not appear that she received any albumin after the procedure. She is here today for management of hyponatremia. No melena/hematochezia, no confusion, no seizures. No F/C.    Past Medical History:   Diagnosis Date    Hypertension     Liver cirrhosis        Past Surgical History:   Procedure Laterality Date    BREAST SURGERY      CHOLECYSTECTOMY      HYSTERECTOMY         Review of patient's allergies indicates:   Allergen Reactions    Ferrous sulfate Other (See Comments)     Patient states the pill makes her sick. She stated she would rather have a shot     Family History     Problem Relation (Age of Onset)    Depression Maternal Grandfather    Diabetes Father, Sister    Hypertension Mother, Father        Social History Main  Topics    Smoking status: Never Smoker    Smokeless tobacco: Never Used    Alcohol use Yes      Comment: occasionally    Drug use: No    Sexual activity: Not Currently     Review of Systems   Constitutional: Positive for fatigue. Negative for chills, fever and unexpected weight change.   HENT: Negative for sore throat and trouble swallowing.    Eyes: Negative for pain, redness and visual disturbance.   Respiratory: Negative for cough, chest tightness and shortness of breath.    Cardiovascular: Negative for chest pain, palpitations and leg swelling.   Gastrointestinal: Positive for abdominal distention and diarrhea. Negative for abdominal pain, blood in stool, constipation, nausea and vomiting.   Genitourinary: Negative for difficulty urinating, dysuria and hematuria.   Musculoskeletal: Negative for arthralgias, back pain and joint swelling.   Skin: Negative for color change, pallor and rash.   Neurological: Negative for dizziness, seizures, light-headedness and headaches.   Hematological: Negative for adenopathy.   Psychiatric/Behavioral: The patient is not nervous/anxious.      Objective:     Vital Signs (Most Recent):  Temp: 97.7 °F (36.5 °C) (11/11/17 0816)  Pulse: 82 (11/11/17 0816)  Resp: 18 (11/11/17 0816)  BP: (!) 109/54 (11/11/17 0816)  SpO2: 100 % (11/11/17 0816) Vital Signs (24h Range):  Temp:  [97 °F (36.1 °C)-97.9 °F (36.6 °C)] 97.7 °F (36.5 °C)  Pulse:  [78-92] 82  Resp:  [17-18] 18  SpO2:  [99 %-100 %] 100 %  BP: (104-115)/(45-54) 109/54     Weight: 65.3 kg (143 lb 15.4 oz) (11/11/17 0346)  Body mass index is 23.96 kg/m².      Intake/Output Summary (Last 24 hours) at 11/11/17 0945  Last data filed at 11/11/17 0600   Gross per 24 hour   Intake              515 ml   Output                0 ml   Net              515 ml       Lines/Drains/Airways     Peripheral Intravenous Line                 Peripheral IV - Single Lumen 11/10/17 1900 Right Antecubital less than 1 day                Physical Exam    Constitutional: She is oriented to person, place, and time.   Chronically ill appearing   HENT:   Mouth/Throat: Oropharynx is clear and moist.   Eyes: Conjunctivae are normal. Pupils are equal, round, and reactive to light. Scleral icterus is present.   Neck: No thyromegaly present.   Cardiovascular: Normal rate, regular rhythm and normal heart sounds.    No murmur heard.  Pulmonary/Chest: Effort normal and breath sounds normal. She has no wheezes. She has no rales.   Abdominal: Soft. Bowel sounds are normal. She exhibits distension. She exhibits no mass. There is no hepatosplenomegaly. There is no tenderness.   Musculoskeletal: Normal range of motion. She exhibits no edema or tenderness.   Lymphadenopathy:     She has no cervical adenopathy.   Neurological: She is alert and oriented to person, place, and time.   No asterixis   Skin: No rash noted. No erythema.   Psychiatric: She has a normal mood and affect. Her behavior is normal.   Nursing note and vitals reviewed.      Significant Labs:  CBC:   Recent Labs  Lab 11/10/17  1902 11/11/17  0424   WBC 12.01 13.55*   HGB 11.8* 10.8*   HCT 31.8* 29.2*   * 61*     CMP:   Recent Labs  Lab 11/10/17  1902  11/11/17  0752   *  < > 102   CALCIUM 9.3  < > 8.9   ALBUMIN 2.7*  --   --    PROT 7.3  --   --    *  < > 118*   K 4.4  < > 4.7   CO2 21*  < > 18*   CL 87*  < > 92*   BUN 13  < > 19   CREATININE 0.9  < > 0.8   ALKPHOS 252*  --   --    *  --   --    *  --   --    BILITOT 13.6*  --   --    < > = values in this interval not displayed.  Coagulation:   Recent Labs  Lab 11/09/17  1357 11/11/17  0424   INR 1.7* 1.7*   APTT 32.2*  --        Significant Imaging:  No pertinent studies     Scheduled Meds:   albumin human 25%  25 g Intravenous Once    cetirizine  10 mg Oral Daily    lactulose  10 g Oral TID    pantoprazole  40 mg Oral Daily    predniSONE  40 mg Oral Daily     Continuous Infusions:   sodium chloride 0.9% 75 mL/hr at 11/10/17  7659     PRN Meds:.ondansetron, ramelteon      Assessment/Plan:     * Hyponatremia    Likely from combination of diuretics, volume depletion (from diarrhea, diuretics, recent paracentesis). Hold diuretics. Restrict fluid intake to 1.5L daily. Recent TSH normal. No missed doses of prednisone to raise suspicion for possible adrenal insufficiency. Will give IV albumin to replace volume. Agree with nephrology consult.        Alcoholic hepatitis with end-stage liver disease    Continue prednisone. Liver function poor but stable.    MELD-Na score: 29 at 11/11/2017  7:52 AM  MELD score: 22 at 11/11/2017  7:52 AM  Calculated from:  Serum Creatinine: 0.8 mg/dL (Rounded to 1) at 11/11/2017  7:52 AM  Serum Sodium: 118 mmol/L (Rounded to 125) at 11/11/2017  7:52 AM  Total Bilirubin: 13.6 mg/dL at 11/10/2017  7:02 PM  INR(ratio): 1.7 at 11/11/2017  4:24 AM  Age: 52 years          Ascites    Continue to monitor. Once Na improves will consider restarting diuretics at a lower dose, but may need to continue to hold these.            Thank you for your consult. I will follow-up with patient. Please contact us if you have any additional questions.    Andrew Garcia MD  Gastroenterology  [facility]

## 2017-11-12 LAB
ANION GAP SERPL CALC-SCNC: 7 MMOL/L
ANION GAP SERPL CALC-SCNC: 7 MMOL/L
ANION GAP SERPL CALC-SCNC: 8 MMOL/L
ANISOCYTOSIS BLD QL SMEAR: ABNORMAL
BASOPHILS # BLD AUTO: 0.02 K/UL
BASOPHILS NFR BLD: 0.1 %
BUN SERPL-MCNC: 12 MG/DL
BUN SERPL-MCNC: 12 MG/DL
BUN SERPL-MCNC: 13 MG/DL
CALCIUM SERPL-MCNC: 8.3 MG/DL
CALCIUM SERPL-MCNC: 8.5 MG/DL
CALCIUM SERPL-MCNC: 8.6 MG/DL
CHLORIDE SERPL-SCNC: 92 MMOL/L
CHLORIDE SERPL-SCNC: 92 MMOL/L
CHLORIDE SERPL-SCNC: 94 MMOL/L
CO2 SERPL-SCNC: 18 MMOL/L
CO2 SERPL-SCNC: 18 MMOL/L
CO2 SERPL-SCNC: 20 MMOL/L
CREAT SERPL-MCNC: 0.6 MG/DL
CREAT SERPL-MCNC: 0.7 MG/DL
CREAT SERPL-MCNC: 0.7 MG/DL
DIFFERENTIAL METHOD: ABNORMAL
EOSINOPHIL # BLD AUTO: 0.1 K/UL
EOSINOPHIL NFR BLD: 0.8 %
ERYTHROCYTE [DISTWIDTH] IN BLOOD BY AUTOMATED COUNT: 23.5 %
EST. GFR  (AFRICAN AMERICAN): >60 ML/MIN/1.73 M^2
EST. GFR  (NON AFRICAN AMERICAN): >60 ML/MIN/1.73 M^2
GLUCOSE SERPL-MCNC: 110 MG/DL
GLUCOSE SERPL-MCNC: 115 MG/DL
GLUCOSE SERPL-MCNC: 152 MG/DL
HCT VFR BLD AUTO: 30.4 %
HGB BLD-MCNC: 11.4 G/DL
HYPOCHROMIA BLD QL SMEAR: ABNORMAL
INR PPP: 1.8
LYMPHOCYTES # BLD AUTO: 2.6 K/UL
LYMPHOCYTES NFR BLD: 18.9 %
MCH RBC QN AUTO: 28.7 PG
MCHC RBC AUTO-ENTMCNC: 37.5 G/DL
MCV RBC AUTO: 77 FL
MONOCYTES # BLD AUTO: 1 K/UL
MONOCYTES NFR BLD: 7.3 %
NEUTROPHILS # BLD AUTO: 10.1 K/UL
NEUTROPHILS NFR BLD: 73.2 %
OVALOCYTES BLD QL SMEAR: ABNORMAL
PLATELET # BLD AUTO: 57 K/UL
PLATELET BLD QL SMEAR: ABNORMAL
PMV BLD AUTO: ABNORMAL FL
POIKILOCYTOSIS BLD QL SMEAR: SLIGHT
POLYCHROMASIA BLD QL SMEAR: ABNORMAL
POTASSIUM SERPL-SCNC: 4.2 MMOL/L
POTASSIUM SERPL-SCNC: 4.3 MMOL/L
POTASSIUM SERPL-SCNC: 4.6 MMOL/L
PROTHROMBIN TIME: 18.2 SEC
RBC # BLD AUTO: 3.97 M/UL
SODIUM SERPL-SCNC: 118 MMOL/L
SODIUM SERPL-SCNC: 119 MMOL/L
SODIUM SERPL-SCNC: 119 MMOL/L
STOMATOCYTES BLD QL SMEAR: PRESENT
TARGETS BLD QL SMEAR: ABNORMAL
WBC # BLD AUTO: 13.79 K/UL

## 2017-11-12 PROCEDURE — 99232 SBSQ HOSP IP/OBS MODERATE 35: CPT | Mod: ,,, | Performed by: INTERNAL MEDICINE

## 2017-11-12 PROCEDURE — 21400001 HC TELEMETRY ROOM

## 2017-11-12 PROCEDURE — 63600175 PHARM REV CODE 636 W HCPCS: Performed by: NURSE PRACTITIONER

## 2017-11-12 PROCEDURE — 85025 COMPLETE CBC W/AUTO DIFF WBC: CPT

## 2017-11-12 PROCEDURE — 85610 PROTHROMBIN TIME: CPT

## 2017-11-12 PROCEDURE — 80048 BASIC METABOLIC PNL TOTAL CA: CPT | Mod: 91

## 2017-11-12 PROCEDURE — 25000003 PHARM REV CODE 250: Performed by: NURSE PRACTITIONER

## 2017-11-12 PROCEDURE — 25000003 PHARM REV CODE 250: Performed by: FAMILY MEDICINE

## 2017-11-12 PROCEDURE — 80048 BASIC METABOLIC PNL TOTAL CA: CPT

## 2017-11-12 PROCEDURE — 36415 COLL VENOUS BLD VENIPUNCTURE: CPT

## 2017-11-12 RX ADMIN — LACTULOSE 10 G: 10 SOLUTION ORAL at 01:11

## 2017-11-12 RX ADMIN — SODIUM CHLORIDE: 0.9 INJECTION, SOLUTION INTRAVENOUS at 12:11

## 2017-11-12 RX ADMIN — PANTOPRAZOLE SODIUM 40 MG: 40 TABLET, DELAYED RELEASE ORAL at 09:11

## 2017-11-12 RX ADMIN — SODIUM CHLORIDE: 0.9 INJECTION, SOLUTION INTRAVENOUS at 09:11

## 2017-11-12 RX ADMIN — PREDNISONE 40 MG: 20 TABLET ORAL at 09:11

## 2017-11-12 RX ADMIN — LACTULOSE 10 G: 10 SOLUTION ORAL at 05:11

## 2017-11-12 RX ADMIN — SODIUM CHLORIDE: 0.9 INJECTION, SOLUTION INTRAVENOUS at 01:11

## 2017-11-12 RX ADMIN — LACTULOSE 10 G: 10 SOLUTION ORAL at 09:11

## 2017-11-12 RX ADMIN — CETIRIZINE HYDROCHLORIDE 10 MG: 10 TABLET, FILM COATED ORAL at 09:11

## 2017-11-12 NOTE — PLAN OF CARE
Problem: Patient Care Overview  Goal: Plan of Care Review  Outcome: Ongoing (interventions implemented as appropriate)  Pt verbalized understanding of plan of care. Fall precautions maintained.  Bed locked and low.  Call light and personal items within reach. SR up x 2.  24 hour chart check complete.

## 2017-11-12 NOTE — HOSPITAL COURSE
Pt is a 51 y/o female with alcoholic liver disease who was admitted for hyponatremia. Sodium increasing very slowly. Hold diuretics. Restrict fluid intake to 1.5L daily. Gentle hydration. Lengthy discussion with renal, pt unlikely to return to normal Na, until liver transplant.  A goal of over 120 may be all that is realistic.  Platelets down to 57 abnormal coags, lovenox held. Hyponatremia likely due to overdiuresis/dehydration. Nephrology consult. Urinary sodium and osmality elevated.  will need to be held and f/u with Gi.  Pt not feeling girth has increased or a need for paracenteses. Elevated AFP will be further addressed as outpatient. IVF's stopped per Nephrology. Na essentially unchanged. Na 120. Abdominal US showed Cirrhosis. Moderate perihepatic ascites. Normal liver Doppler. Negative for liver mass. Cholecystectomy. Case discussed with keo Fulton to discharge from GI standpoint. Patient needs to follow-up with Dr. Souza in 1 week as scheduled for hospital follow-up and lab studies. Continue to hold diuretics unless pt. Becomes SOB. Case also discussed with keo Fan to discharge from Nephrology standpoint. Patient seen and examined on the date of discharge and found to be suitable for discharge.

## 2017-11-12 NOTE — PROGRESS NOTES
[facility] -   Gastroenterology  Progress Note    Patient Name: Marcie Bazan  MRN: [id number]  Admission Date: 11/10/2017  Hospital Length of Stay: 2 days  Code Status: Full Code   Attending Provider: Gigi Lozano MD  Consulting Provider: Andrew Garcia MD  Primary Care Physician: ODILIA Wall  Principal Problem: Hyponatremia      Subjective:     Interval History: No issues overnight. Feels OK.    Review of Systems   Constitutional: Positive for fatigue. Negative for chills and fever.   Respiratory: Negative for cough, chest tightness and shortness of breath.    Cardiovascular: Negative for chest pain, palpitations and leg swelling.   Gastrointestinal: Negative for abdominal distention, abdominal pain, blood in stool, constipation, diarrhea, nausea and vomiting.   Genitourinary: Negative for difficulty urinating.   Neurological: Positive for weakness. Negative for dizziness and headaches.     Objective:     Vital Signs (Most Recent):  Temp: 98.1 °F (36.7 °C) (11/12/17 0912)  Pulse: 92 (11/12/17 0912)  Resp: 18 (11/12/17 0912)  BP: (!) 117/56 (11/12/17 0912)  SpO2: 99 % (11/12/17 0912) Vital Signs (24h Range):  Temp:  [97.8 °F (36.6 °C)-98.2 °F (36.8 °C)] 98.1 °F (36.7 °C)  Pulse:  [77-92] 92  Resp:  [16-18] 18  SpO2:  [97 %-100 %] 99 %  BP: (113-129)/(56-60) 117/56     Weight: 68.9 kg (151 lb 14.4 oz) (11/12/17 0422)  Body mass index is 25.28 kg/m².      Intake/Output Summary (Last 24 hours) at 11/12/17 1057  Last data filed at 11/12/17 0644   Gross per 24 hour   Intake           856.25 ml   Output                0 ml   Net           856.25 ml       Lines/Drains/Airways     Peripheral Intravenous Line                 Peripheral IV - Single Lumen 11/12/17 0110 Right Forearm less than 1 day                Physical Exam   Constitutional: She is oriented to person, place, and time. She appears well-developed and well-nourished.   Eyes: No scleral icterus.   Cardiovascular: Normal  rate, regular rhythm and normal heart sounds.    No murmur heard.  Pulmonary/Chest: Effort normal and breath sounds normal. She has no wheezes. She has no rales.   Abdominal: Soft. Bowel sounds are normal. She exhibits distension. There is no hepatosplenomegaly. There is no tenderness.   Mild ascites   Musculoskeletal: She exhibits no edema.   Neurological: She is alert and oriented to person, place, and time.   Psychiatric: She has a normal mood and affect. Her behavior is normal.       Significant Labs:  CBC:   Recent Labs  Lab 11/10/17  1902 11/11/17  0424 11/12/17  0604   WBC 12.01 13.55* 13.79*   HGB 11.8* 10.8* 11.4*   HCT 31.8* 29.2* 30.4*   * 61* 57*     CMP:   Recent Labs  Lab 11/10/17  1902  11/12/17  0810   *  < > 110   CALCIUM 9.3  < > 8.6*   ALBUMIN 2.7*  --   --    PROT 7.3  --   --    *  < > 119*   K 4.4  < > 4.2   CO2 21*  < > 20*   CL 87*  < > 92*   BUN 13  < > 13   CREATININE 0.9  < > 0.7   ALKPHOS 252*  --   --    *  --   --    *  --   --    BILITOT 13.6*  --   --    < > = values in this interval not displayed.  Coagulation:   Recent Labs  Lab 11/12/17  0604   INR 1.8*         Significant Imaging:  No new imaging     Scheduled Meds:   cetirizine  10 mg Oral Daily    lactulose  10 g Oral TID    pantoprazole  40 mg Oral Daily    predniSONE  40 mg Oral Daily     Continuous Infusions:   sodium chloride 0.9% 75 mL/hr at 11/12/17 0105     PRN Meds:.ondansetron, ramelteon      Assessment/Plan:     * Hyponatremia    Likely from combination of diuretics, volume depletion (from diarrhea, diuretics, recent paracentesis). Hold diuretics. Restrict fluid intake to 1.5L daily. Recent TSH normal. No missed doses of prednisone to raise suspicion for possible adrenal insufficiency. Appreciate nephrology consult.    - Na essentially unchanged  - Continue fluid restriction  - Continue to hold diuretics  - Continue slow hydration with NS        Alcoholic hepatitis with end-stage  liver disease    Continue prednisone. Liver function poor but stable. Repeat labs in AM.    MELD-Na score: 30 at 11/12/2017  8:10 AM  MELD score: 23 at 11/12/2017  8:10 AM  Calculated from:  Serum Creatinine: 0.7 mg/dL (Rounded to 1) at 11/12/2017  8:10 AM  Serum Sodium: 119 mmol/L (Rounded to 125) at 11/12/2017  8:10 AM  Total Bilirubin: 13.6 mg/dL at 11/10/2017  7:02 PM  INR(ratio): 1.8 at 11/12/2017  6:04 AM  Age: 52 years          Ascites    Continue to monitor. Once Na improves will consider restarting diuretics at a lower dose, but may need to continue to hold these.            Thank you for your consult. I will follow-up with patient. Please contact us if you have any additional questions.    Andrew Garcia MD  Gastroenterology  [facility] -

## 2017-11-12 NOTE — ASSESSMENT & PLAN NOTE
Continue prednisone. Liver function poor but stable. Repeat labs in AM.    MELD-Na score: 30 at 11/12/2017  8:10 AM  MELD score: 23 at 11/12/2017  8:10 AM  Calculated from:  Serum Creatinine: 0.7 mg/dL (Rounded to 1) at 11/12/2017  8:10 AM  Serum Sodium: 119 mmol/L (Rounded to 125) at 11/12/2017  8:10 AM  Total Bilirubin: 13.6 mg/dL at 11/10/2017  7:02 PM  INR(ratio): 1.8 at 11/12/2017  6:04 AM  Age: 52 years

## 2017-11-12 NOTE — ASSESSMENT & PLAN NOTE
Likely from combination of diuretics, volume depletion (from diarrhea, diuretics, recent paracentesis). Hold diuretics. Restrict fluid intake to 1.5L daily. Recent TSH normal. No missed doses of prednisone to raise suspicion for possible adrenal insufficiency. Appreciate nephrology consult.    - Na essentially unchanged  - Continue fluid restriction  - Continue to hold diuretics  - Continue slow hydration with NS

## 2017-11-12 NOTE — SUBJECTIVE & OBJECTIVE
Review of Systems   Constitutional: Positive for fatigue. Negative for activity change, chills, diaphoresis, fever and unexpected weight change.   HENT: Negative for congestion, nosebleeds, postnasal drip, rhinorrhea, sinus pressure, sore throat and trouble swallowing.    Eyes: Negative for photophobia and visual disturbance.   Respiratory: Negative for apnea, cough, chest tightness, shortness of breath and wheezing.    Cardiovascular: Negative for chest pain, palpitations and leg swelling.   Gastrointestinal: Positive for nausea. Negative for abdominal distention, abdominal pain, blood in stool, constipation, diarrhea and vomiting.   Endocrine: Negative for polydipsia, polyphagia and polyuria.   Genitourinary: Negative for difficulty urinating, dysuria, frequency, hematuria and urgency.   Musculoskeletal: Negative for arthralgias, back pain, gait problem, joint swelling and myalgias.   Skin: Negative for pallor, rash and wound.   Neurological: Negative for dizziness, tremors, seizures, syncope, facial asymmetry, speech difficulty, weakness, light-headedness, numbness and headaches.   Psychiatric/Behavioral: Negative for agitation, confusion, hallucinations and sleep disturbance. The patient is not nervous/anxious.    All other systems reviewed and are negative.    Objective:     Vital Signs (Most Recent):  Temp: 98.2 °F (36.8 °C) (11/11/17 2011)  Pulse: 84 (11/11/17 2011)  Resp: 16 (11/11/17 2011)  BP: 129/60 (11/11/17 2011)  SpO2: 97 % (11/11/17 2011) Vital Signs (24h Range):  Temp:  [97.6 °F (36.4 °C)-98.2 °F (36.8 °C)] 98.2 °F (36.8 °C)  Pulse:  [78-84] 84  Resp:  [16-18] 16  SpO2:  [97 %-100 %] 97 %  BP: (106-129)/(45-60) 129/60     Weight: 65.3 kg (143 lb 15.4 oz)  Body mass index is 23.96 kg/m².    Intake/Output Summary (Last 24 hours) at 11/11/17 7720  Last data filed at 11/11/17 0600   Gross per 24 hour   Intake              515 ml   Output                0 ml   Net              515 ml      Physical  Exam   Constitutional: She is oriented to person, place, and time. She appears well-developed and well-nourished. No distress.   HENT:   Head: Normocephalic and atraumatic.   Eyes: Conjunctivae and EOM are normal. Pupils are equal, round, and reactive to light.   Neck: Normal range of motion. Neck supple. No JVD present.   Cardiovascular: Normal rate, regular rhythm, S1 normal, S2 normal and intact distal pulses.  Exam reveals no gallop.    No murmur heard.  Pulmonary/Chest: Effort normal and breath sounds normal. No respiratory distress. She has no wheezes. She has no rales.   Abdominal: Soft. Bowel sounds are normal. She exhibits ascites. She exhibits no distension. There is no tenderness.   Musculoskeletal: Normal range of motion. She exhibits no tenderness or deformity.   Neurological: She is alert and oriented to person, place, and time. No cranial nerve deficit or sensory deficit.   Skin: Skin is warm and dry. Capillary refill takes less than 2 seconds. No rash noted. No erythema.   Psychiatric: She has a normal mood and affect. Judgment normal.   Nursing note and vitals reviewed.      Significant Labs: All pertinent labs within the past 24 hours have been reviewed.    Significant Imaging: I have reviewed and interpreted all pertinent imaging results/findings within the past 24 hours.

## 2017-11-12 NOTE — ASSESSMENT & PLAN NOTE
1. Hyponatremia : due to ESLD , was about 126 a month ago ,  119  today , can be due to dehydration, agree with holding diuretics , cont IV NS at 100 ml/hr ,     can be an indicator of poor prognosis in this woman , will follow labs ,     2. HTN : BP controlled    3. Stable Hb    4. Thrombocytopenia consistent with ESLD

## 2017-11-12 NOTE — PROGRESS NOTES
[facility] Hospital Medicine  Progress Note    Patient Name: Marcie Bazan  MRN: [id number]  Patient Class: IP- Inpatient   Admission Date: 11/10/2017  Length of Stay: 1 days  Attending Physician: Stevenson Allison MD  Primary Care Provider: ODILIA Wall        Subjective:     Principal Problem:Hyponatremia    HPI:  Ms. Bazan is a 53 yo female  with a PMHx of alcoholic cirrhosis with ESLD (MELD score of 29) on liver transplant list, who was seen by Dr. Souza in clinic on 11/8 for nausea and fatigue.  She underwent therapeutic paracentesis 11/9, draining 2L.  At that time, labs were drawn and resulted a critical Na of 121.  Subsequently, she was instructed to come to ED for repeat labs and further evaluation.  Initial work-up in ED resulted Na 119, , , ammonia 33, INR 1.7, H&H 11.8/31.8, plt 109; VSS.  [facility] Medicine was consulted for admission.  Currently, patient appears comfortable, in NAD.  Denies any nausea currently, but reports fatigue persists.  Denies any lightheadedness/dizziness, syncope, AMS, visual disturbances, focal deficits, numbness/tingling, HA, seizure-like activity, chest pain, palpitations, SOB, cough, orthopnea, PND, edema, weight gain, ABD pain, N/V/D, dysuria, hematuria, fever, or chills.  Case discussed with Nephrology, who recommend 0.9% NS @ 75 mL/hr with BMP Q 8 hours.    Hospital Course:  Wants to go home, no bleeding, no increased abdominal girth.          Review of Systems   Constitutional: Positive for fatigue. Negative for activity change, chills, diaphoresis, fever and unexpected weight change.   HENT: Negative for congestion, nosebleeds, postnasal drip, rhinorrhea, sinus pressure, sore throat and trouble swallowing.    Eyes: Negative for photophobia and visual disturbance.   Respiratory: Negative for apnea, cough, chest tightness, shortness of breath and wheezing.    Cardiovascular: Negative for chest pain, palpitations and leg swelling.    Gastrointestinal: Positive for nausea. Negative for abdominal distention, abdominal pain, blood in stool, constipation, diarrhea and vomiting.   Endocrine: Negative for polydipsia, polyphagia and polyuria.   Genitourinary: Negative for difficulty urinating, dysuria, frequency, hematuria and urgency.   Musculoskeletal: Negative for arthralgias, back pain, gait problem, joint swelling and myalgias.   Skin: Negative for pallor, rash and wound.   Neurological: Negative for dizziness, tremors, seizures, syncope, facial asymmetry, speech difficulty, weakness, light-headedness, numbness and headaches.   Psychiatric/Behavioral: Negative for agitation, confusion, hallucinations and sleep disturbance. The patient is not nervous/anxious.    All other systems reviewed and are negative.    Objective:     Vital Signs (Most Recent):  Temp: 98.2 °F (36.8 °C) (11/11/17 2011)  Pulse: 84 (11/11/17 2011)  Resp: 16 (11/11/17 2011)  BP: 129/60 (11/11/17 2011)  SpO2: 97 % (11/11/17 2011) Vital Signs (24h Range):  Temp:  [97.6 °F (36.4 °C)-98.2 °F (36.8 °C)] 98.2 °F (36.8 °C)  Pulse:  [78-84] 84  Resp:  [16-18] 16  SpO2:  [97 %-100 %] 97 %  BP: (106-129)/(45-60) 129/60     Weight: 65.3 kg (143 lb 15.4 oz)  Body mass index is 23.96 kg/m².    Intake/Output Summary (Last 24 hours) at 11/11/17 2149  Last data filed at 11/11/17 0600   Gross per 24 hour   Intake              515 ml   Output                0 ml   Net              515 ml      Physical Exam   Constitutional: She is oriented to person, place, and time. She appears well-developed and well-nourished. No distress.   HENT:   Head: Normocephalic and atraumatic.   Eyes: Conjunctivae and EOM are normal. Pupils are equal, round, and reactive to light.   Neck: Normal range of motion. Neck supple. No JVD present.   Cardiovascular: Normal rate, regular rhythm, S1 normal, S2 normal and intact distal pulses.  Exam reveals no gallop.    No murmur heard.  Pulmonary/Chest: Effort normal and  breath sounds normal. No respiratory distress. She has no wheezes. She has no rales.   Abdominal: Soft. Bowel sounds are normal. She exhibits ascites. She exhibits no distension. There is no tenderness.   Musculoskeletal: Normal range of motion. She exhibits no tenderness or deformity.   Neurological: She is alert and oriented to person, place, and time. No cranial nerve deficit or sensory deficit.   Skin: Skin is warm and dry. Capillary refill takes less than 2 seconds. No rash noted. No erythema.   Psychiatric: She has a normal mood and affect. Judgment normal.   Nursing note and vitals reviewed.      Significant Labs: All pertinent labs within the past 24 hours have been reviewed.    Significant Imaging: I have reviewed and interpreted all pertinent imaging results/findings within the past 24 hours.    Assessment/Plan:      * Hyponatremia    - Will admit to Inpatient.  - Currently, asymptomatic.  - Case discussed with Nephrology.  - 0.9% NS @ 75 mL/hr.  - BMP Q8 hours.  - Nephrology to evaluate in Am.    11/11 2/2 end state liver.  Nephrology on board.  Pt understands Tx will take time as slow changes to sodium are needed.          Alcoholic hepatitis with end-stage liver disease    - MELD score of 29.  On liver transplant list.  - Continue home lactulose.  - LFT's increased from baseline at 232/199.  - Ammonia WNL at 33.  - Consult Dr. Souza/GI in AM.  - Avoid hepatotoxic agents.    GI on board, lactulose ordered, last paracentesis 2d ago, no AMS        Coagulopathy    - Secondary to ESLD.  - No evidence of active bleeding.  - INR/platelets stable.  Monitor.        Ascites              Essential hypertension    - BP well controlled currently without antihypertensives.  - Will hold home BP meds to prevent hypotension.  Monitor BP trends, and resume as needed.          VTE Risk Mitigation         Ordered     Medium Risk of VTE  Once      11/10/17 1605     Place sequential compression device  Until discontinued       11/10/17 2347     Reason for No Pharmacological VTE Prophylaxis  Once      11/10/17 2346              Gigi Lozano MD  Department of Hospital Medicine   [facility]

## 2017-11-12 NOTE — SUBJECTIVE & OBJECTIVE
Interval History:  No acute events, denies complaints ,     Review of patient's allergies indicates:   Allergen Reactions    Ferrous sulfate Other (See Comments)     Patient states the pill makes her sick. She stated she would rather have a shot     Current Facility-Administered Medications   Medication Frequency    0.9%  NaCl infusion Continuous    cetirizine tablet 10 mg Daily    lactulose 20 gram/30 mL solution Soln 10 g TID    ondansetron disintegrating tablet 4 mg Q8H PRN    pantoprazole EC tablet 40 mg Daily    predniSONE tablet 40 mg Daily    ramelteon tablet 8 mg Nightly PRN       Objective:     Vital Signs (Most Recent):  Temp: 98.1 °F (36.7 °C) (11/12/17 0912)  Pulse: 92 (11/12/17 0912)  Resp: 18 (11/12/17 0912)  BP: (!) 117/56 (11/12/17 0912)  SpO2: 99 % (11/12/17 0912)  O2 Device (Oxygen Therapy): room air (11/12/17 0912) Vital Signs (24h Range):  Temp:  [97.8 °F (36.6 °C)-98.2 °F (36.8 °C)] 98.1 °F (36.7 °C)  Pulse:  [77-92] 92  Resp:  [16-18] 18  SpO2:  [97 %-100 %] 99 %  BP: (114-129)/(56-60) 117/56     Weight: 68.9 kg (151 lb 14.4 oz) (11/12/17 0422)  Body mass index is 25.28 kg/m².  Body surface area is 1.78 meters squared.    I/O last 3 completed shifts:  In: 1371.3 [I.V.:1371.3]  Out: -     Physical Exam   Constitutional: She is oriented to person, place, and time. She appears well-developed. No distress.   HENT:   Head: Normocephalic and atraumatic.   Mouth/Throat: Oropharynx is clear and moist. No oropharyngeal exudate.   Eyes: EOM are normal. Pupils are equal, round, and reactive to light.   Icteric sclera   Neck: Normal range of motion. Neck supple. No JVD present. Carotid bruit is not present. No tracheal deviation present. No thyroid mass and no thyromegaly present.   Cardiovascular: Normal rate, regular rhythm, normal heart sounds and intact distal pulses.  Exam reveals no gallop and no friction rub.    No murmur heard.  Pulmonary/Chest: Effort normal and breath sounds normal. No  respiratory distress. She has no wheezes. She has no rales. She exhibits no tenderness.   Abdominal: Soft. Bowel sounds are normal. She exhibits distension. She exhibits no abdominal bruit, no ascites and no mass. There is no hepatosplenomegaly. There is no tenderness. There is no rebound, no guarding and no CVA tenderness.   Musculoskeletal: Normal range of motion. She exhibits no edema or tenderness.   Lymphadenopathy:     She has no cervical adenopathy.   Neurological: She is alert and oriented to person, place, and time. She has normal reflexes. She displays normal reflexes. No cranial nerve deficit. She exhibits normal muscle tone. Coordination normal.   Skin: Skin is warm and intact. No rash noted. No erythema. No pallor.       Significant Labs:  CBC:   Recent Labs  Lab 11/12/17  0604   WBC 13.79*   RBC 3.97*   HGB 11.4*   HCT 30.4*   PLT 57*   MCV 77*   MCH 28.7   MCHC 37.5*     CMP:   Recent Labs  Lab 11/10/17  1902  11/12/17  0810   *  < > 110   CALCIUM 9.3  < > 8.6*   ALBUMIN 2.7*  --   --    PROT 7.3  --   --    *  < > 119*   K 4.4  < > 4.2   CO2 21*  < > 20*   CL 87*  < > 92*   BUN 13  < > 13   CREATININE 0.9  < > 0.7   ALKPHOS 252*  --   --    *  --   --    *  --   --    BILITOT 13.6*  --   --    < > = values in this interval not displayed.  Coagulation:   Recent Labs  Lab 11/09/17  1357  11/12/17  0604   INR 1.7*  < > 1.8*   APTT 32.2*  --   --    < > = values in this interval not displayed.  LFTs:   Recent Labs  Lab 11/10/17  1902   *   *   ALKPHOS 252*   BILITOT 13.6*   PROT 7.3   ALBUMIN 2.7*     All labs within the past 24 hours have been reviewed.     Significant Imaginreviewed reviewed

## 2017-11-12 NOTE — SUBJECTIVE & OBJECTIVE
Review of Systems   Constitutional: Positive for fatigue. Negative for activity change, chills, diaphoresis, fever and unexpected weight change.   HENT: Negative for congestion, nosebleeds, postnasal drip, rhinorrhea, sinus pressure, sore throat and trouble swallowing.    Eyes: Negative for photophobia and visual disturbance.   Respiratory: Negative for apnea, cough, chest tightness, shortness of breath and wheezing.    Cardiovascular: Negative for chest pain, palpitations and leg swelling.   Gastrointestinal: Positive for nausea. Negative for abdominal distention, abdominal pain, blood in stool, constipation, diarrhea and vomiting.   Endocrine: Negative for polydipsia, polyphagia and polyuria.   Genitourinary: Negative for difficulty urinating, dysuria, frequency, hematuria and urgency.   Musculoskeletal: Negative for arthralgias, back pain, gait problem, joint swelling and myalgias.   Skin: Negative for pallor, rash and wound.   Neurological: Negative for dizziness, tremors, seizures, syncope, facial asymmetry, speech difficulty, weakness, light-headedness, numbness and headaches.   Psychiatric/Behavioral: Negative for agitation, confusion, hallucinations and sleep disturbance. The patient is not nervous/anxious.    All other systems reviewed and are negative.    Objective:     Vital Signs (Most Recent):  Temp: 98 °F (36.7 °C) (11/12/17 1633)  Pulse: 90 (11/12/17 1633)  Resp: 18 (11/12/17 1633)  BP: 125/64 (11/12/17 1633)  SpO2: 100 % (11/12/17 1633) Vital Signs (24h Range):  Temp:  [97.8 °F (36.6 °C)-98.2 °F (36.8 °C)] 98 °F (36.7 °C)  Pulse:  [77-92] 90  Resp:  [16-18] 18  SpO2:  [97 %-100 %] 100 %  BP: (113-129)/(56-64) 125/64     Weight: 68.9 kg (151 lb 14.4 oz)  Body mass index is 25.28 kg/m².    Intake/Output Summary (Last 24 hours) at 11/12/17 1758  Last data filed at 11/12/17 1736   Gross per 24 hour   Intake           2007.5 ml   Output              450 ml   Net           1557.5 ml      Physical Exam    Constitutional: She is oriented to person, place, and time. She appears well-developed and well-nourished. No distress.   No confusion or sedation.  No astrixis   HENT:   Head: Normocephalic and atraumatic.   Eyes: Conjunctivae and EOM are normal. Pupils are equal, round, and reactive to light.   Neck: Normal range of motion. Neck supple. No JVD present.   Cardiovascular: Normal rate, regular rhythm, S1 normal, S2 normal and intact distal pulses.  Exam reveals no gallop.    No murmur heard.  Pulmonary/Chest: Effort normal and breath sounds normal. No respiratory distress. She has no wheezes. She has no rales.   Abdominal: Soft. Bowel sounds are normal. She exhibits ascites. She exhibits no distension. There is no tenderness.   Musculoskeletal: Normal range of motion. She exhibits no tenderness or deformity.   Neurological: She is alert and oriented to person, place, and time. No cranial nerve deficit or sensory deficit.   Skin: Skin is warm and dry. Capillary refill takes less than 2 seconds. No rash noted. No erythema.   Psychiatric: She has a normal mood and affect. Judgment normal.   Nursing note and vitals reviewed.      Significant Labs: All pertinent labs within the past 24 hours have been reviewed.    Significant Imaging: I have reviewed and interpreted all pertinent imaging results/findings within the past 24 hours.

## 2017-11-12 NOTE — SUBJECTIVE & OBJECTIVE
Subjective:     Interval History: No issues overnight. Feels OK.    Review of Systems   Constitutional: Positive for fatigue. Negative for chills and fever.   Respiratory: Negative for cough, chest tightness and shortness of breath.    Cardiovascular: Negative for chest pain, palpitations and leg swelling.   Gastrointestinal: Negative for abdominal distention, abdominal pain, blood in stool, constipation, diarrhea, nausea and vomiting.   Genitourinary: Negative for difficulty urinating.   Neurological: Positive for weakness. Negative for dizziness and headaches.     Objective:     Vital Signs (Most Recent):  Temp: 98.1 °F (36.7 °C) (11/12/17 0912)  Pulse: 92 (11/12/17 0912)  Resp: 18 (11/12/17 0912)  BP: (!) 117/56 (11/12/17 0912)  SpO2: 99 % (11/12/17 0912) Vital Signs (24h Range):  Temp:  [97.8 °F (36.6 °C)-98.2 °F (36.8 °C)] 98.1 °F (36.7 °C)  Pulse:  [77-92] 92  Resp:  [16-18] 18  SpO2:  [97 %-100 %] 99 %  BP: (113-129)/(56-60) 117/56     Weight: 68.9 kg (151 lb 14.4 oz) (11/12/17 0422)  Body mass index is 25.28 kg/m².      Intake/Output Summary (Last 24 hours) at 11/12/17 1057  Last data filed at 11/12/17 0644   Gross per 24 hour   Intake           856.25 ml   Output                0 ml   Net           856.25 ml       Lines/Drains/Airways     Peripheral Intravenous Line                 Peripheral IV - Single Lumen 11/12/17 0110 Right Forearm less than 1 day                Physical Exam   Constitutional: She is oriented to person, place, and time. She appears well-developed and well-nourished.   Eyes: No scleral icterus.   Cardiovascular: Normal rate, regular rhythm and normal heart sounds.    No murmur heard.  Pulmonary/Chest: Effort normal and breath sounds normal. She has no wheezes. She has no rales.   Abdominal: Soft. Bowel sounds are normal. She exhibits distension. There is no hepatosplenomegaly. There is no tenderness.   Mild ascites   Musculoskeletal: She exhibits no edema.   Neurological: She is  alert and oriented to person, place, and time.   Psychiatric: She has a normal mood and affect. Her behavior is normal.       Significant Labs:  CBC:   Recent Labs  Lab 11/10/17  1902 11/11/17  0424 11/12/17  0604   WBC 12.01 13.55* 13.79*   HGB 11.8* 10.8* 11.4*   HCT 31.8* 29.2* 30.4*   * 61* 57*     CMP:   Recent Labs  Lab 11/10/17  1902  11/12/17  0810   *  < > 110   CALCIUM 9.3  < > 8.6*   ALBUMIN 2.7*  --   --    PROT 7.3  --   --    *  < > 119*   K 4.4  < > 4.2   CO2 21*  < > 20*   CL 87*  < > 92*   BUN 13  < > 13   CREATININE 0.9  < > 0.7   ALKPHOS 252*  --   --    *  --   --    *  --   --    BILITOT 13.6*  --   --    < > = values in this interval not displayed.  Coagulation:   Recent Labs  Lab 11/12/17  0604   INR 1.8*         Significant Imaging:  No new imaging     Scheduled Meds:   cetirizine  10 mg Oral Daily    lactulose  10 g Oral TID    pantoprazole  40 mg Oral Daily    predniSONE  40 mg Oral Daily     Continuous Infusions:   sodium chloride 0.9% 75 mL/hr at 11/12/17 0105     PRN Meds:.ondansetron, ramelteon

## 2017-11-12 NOTE — ASSESSMENT & PLAN NOTE
- Will admit to Inpatient.  - Currently, asymptomatic.  - Case discussed with Nephrology.  - 0.9% NS @ 75 mL/hr.  - BMP Q8 hours.  - Nephrology to evaluate in Am.    11/11 2/2 end state liver.  Nephrology on board.  Pt understands Tx will take time as slow changes to sodium are needed.

## 2017-11-12 NOTE — ASSESSMENT & PLAN NOTE
- MELD score of 29.  On liver transplant list.  - Continue home lactulose.  - LFT's increased from baseline at 232/199.  - Ammonia WNL at 33.  - Consult Dr. Souza/GI in AM.  - Avoid hepatotoxic agents.    GI on board, lactulose ordered, last paracentesis 2d ago, no AMS

## 2017-11-12 NOTE — PROGRESS NOTES
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 She stated she would rather have a shot     Current Facility-Administered Medications   Medication Frequency    0.9%  NaCl infusion Continuous    cetirizine tablet 10 mg Daily    lactulose 20 gram/30 mL solution Soln 10 g TID    ondansetron disintegrating tablet 4 mg Q8H PRN    pantoprazole EC tablet 40 mg Daily    predniSONE tablet 40 mg Daily    ramelteon tablet 8 mg Nightly PRN       Objective:     Vital Signs (Most Recent):  Temp: 98.1 °F (36.7 °C) (11/12/17 0912)  Pulse: 92 (11/12/17 0912)  Resp: 18 (11/12/17 0912)  BP: (!) 117/56 (11/12/17 0912)  SpO2: 99 % (11/12/17 0912)  O2 Device (Oxygen Therapy): room air (11/12/17 0912) Vital Signs (24h Range):  Temp:  [97.8 °F (36.6 °C)-98.2 °F (36.8 °C)] 98.1 °F (36.7 °C)  Pulse:  [77-92] 92  Resp:  [16-18] 18  SpO2:  [97 %-100 %] 99 %  BP: (114-129)/(56-60) 117/56     Weight: 68.9 kg (151 lb 14.4 oz) (11/12/17 0422)  Body mass index is 25.28 kg/m².  Body surface area is 1.78 meters squared.    I/O last 3 completed shifts:  In: 1371.3 [I.V.:1371.3]  Out: -     Physical Exam   Constitutional: She is oriented to  person, place, and time. She appears well-developed. No distress.   HENT:   Head: Normocephalic and atraumatic.   Mouth/Throat: Oropharynx is clear and moist. No oropharyngeal exudate.   Eyes: EOM are normal. Pupils are equal, round, and reactive to light.   Icteric sclera   Neck: Normal range of motion. Neck supple. No JVD present. Carotid bruit is not present. No tracheal deviation present. No thyroid mass and no thyromegaly present.   Cardiovascular: Normal rate, regular rhythm, normal heart sounds and intact distal pulses.  Exam reveals no gallop and no friction rub.    No murmur heard.  Pulmonary/Chest: Effort normal and breath sounds normal. No respiratory distress. She has no wheezes. She has no rales. She exhibits no tenderness.   Abdominal: Soft. Bowel sounds are normal. She exhibits distension. She exhibits no abdominal bruit, no ascites and no mass. There is no hepatosplenomegaly. There is no tenderness. There is no rebound, no guarding and no CVA tenderness.   Musculoskeletal: Normal range of motion. She exhibits no edema or tenderness.   Lymphadenopathy:     She has no cervical adenopathy.   Neurological: She is alert and oriented to person, place, and time. She has normal reflexes. She displays normal reflexes. No cranial nerve deficit. She exhibits normal muscle tone. Coordination normal.   Skin: Skin is warm and intact. No rash noted. No erythema. No pallor.       Significant Labs:  CBC:   Recent Labs  Lab 11/12/17  0604   WBC 13.79*   RBC 3.97*   HGB 11.4*   HCT 30.4*   PLT 57*   MCV 77*   MCH 28.7   MCHC 37.5*     CMP:   Recent Labs  Lab 11/10/17  1902  11/12/17  0810   *  < > 110   CALCIUM 9.3  < > 8.6*   ALBUMIN 2.7*  --   --    PROT 7.3  --   --    *  < > 119*   K 4.4  < > 4.2   CO2 21*  < > 20*   CL 87*  < > 92*   BUN 13  < > 13   CREATININE 0.9  < > 0.7   ALKPHOS 252*  --   --    *  --   --    *  --   --    BILITOT 13.6*  --   --    < > = values in this interval not  displayed.  Coagulation:   Recent Labs  Lab 11/09/17  1357  11/12/17  0604   INR 1.7*  < > 1.8*   APTT 32.2*  --   --    < > = values in this interval not displayed.  LFTs:   Recent Labs  Lab 11/10/17  1902   *   *   ALKPHOS 252*   BILITOT 13.6*   PROT 7.3   ALBUMIN 2.7*     All labs within the past 24 hours have been reviewed.     Significant Imaginreviewed reviewed     Assessment/Plan:     * Hyponatremia    1. Hyponatremia : due to ESLD , was about 126 a month ago ,  119  today , can be due to dehydration, agree with holding diuretics , cont IV NS at 100 ml/hr ,     can be an indicator of poor prognosis in this woman , will follow labs ,     2. HTN : BP controlled    3. Stable Hb    4. Thrombocytopenia consistent with ESLD              I will follow-up with patient. Please contact us if you have any additional questions.     Total time spent 30 minutes including time needed to review the records,  patient  evaluation, documentation, face-to-face discussion with the patient,  primary team , more than 50% of the time was spent on coordination of care and counseling.       Luis Enrique Schwab MD  Nephrology  [facility] -

## 2017-11-13 LAB
ALBUMIN SERPL BCP-MCNC: 2.2 G/DL
ALP SERPL-CCNC: 219 U/L
ALT SERPL W/O P-5'-P-CCNC: 392 U/L
ANION GAP SERPL CALC-SCNC: 6 MMOL/L
ANION GAP SERPL CALC-SCNC: 7 MMOL/L
ANION GAP SERPL CALC-SCNC: 8 MMOL/L
AST SERPL-CCNC: 440 U/L
BASOPHILS # BLD AUTO: 0.03 K/UL
BASOPHILS NFR BLD: 0.2 %
BILIRUB DIRECT SERPL-MCNC: 6.6 MG/DL
BILIRUB SERPL-MCNC: 11.5 MG/DL
BUN SERPL-MCNC: 8 MG/DL
BUN SERPL-MCNC: 9 MG/DL
BUN SERPL-MCNC: 9 MG/DL
CALCIUM SERPL-MCNC: 8.1 MG/DL
CALCIUM SERPL-MCNC: 8.2 MG/DL
CALCIUM SERPL-MCNC: 8.4 MG/DL
CHLORIDE SERPL-SCNC: 95 MMOL/L
CHLORIDE SERPL-SCNC: 96 MMOL/L
CHLORIDE SERPL-SCNC: 96 MMOL/L
CO2 SERPL-SCNC: 16 MMOL/L
CO2 SERPL-SCNC: 18 MMOL/L
CO2 SERPL-SCNC: 18 MMOL/L
CREAT SERPL-MCNC: 0.6 MG/DL
DIFFERENTIAL METHOD: ABNORMAL
EOSINOPHIL # BLD AUTO: 0.1 K/UL
EOSINOPHIL NFR BLD: 0.8 %
ERYTHROCYTE [DISTWIDTH] IN BLOOD BY AUTOMATED COUNT: 23.9 %
EST. GFR  (AFRICAN AMERICAN): >60 ML/MIN/1.73 M^2
EST. GFR  (NON AFRICAN AMERICAN): >60 ML/MIN/1.73 M^2
GLUCOSE SERPL-MCNC: 114 MG/DL
GLUCOSE SERPL-MCNC: 131 MG/DL
GLUCOSE SERPL-MCNC: 95 MG/DL
HCT VFR BLD AUTO: 28.2 %
HGB BLD-MCNC: 10.3 G/DL
INR PPP: 1.9
LYMPHOCYTES # BLD AUTO: 2.6 K/UL
LYMPHOCYTES NFR BLD: 21 %
MCH RBC QN AUTO: 28.4 PG
MCHC RBC AUTO-ENTMCNC: 36.5 G/DL
MCV RBC AUTO: 78 FL
MONOCYTES # BLD AUTO: 1.4 K/UL
MONOCYTES NFR BLD: 11.7 %
NEUTROPHILS # BLD AUTO: 8.1 K/UL
NEUTROPHILS NFR BLD: 66.5 %
PLATELET # BLD AUTO: 52 K/UL
PMV BLD AUTO: ABNORMAL FL
POTASSIUM SERPL-SCNC: 4.2 MMOL/L
POTASSIUM SERPL-SCNC: 4.4 MMOL/L
POTASSIUM SERPL-SCNC: 4.4 MMOL/L
PROT SERPL-MCNC: 5.6 G/DL
PROTHROMBIN TIME: 19.1 SEC
RBC # BLD AUTO: 3.63 M/UL
SODIUM SERPL-SCNC: 120 MMOL/L
WBC # BLD AUTO: 12.25 K/UL

## 2017-11-13 PROCEDURE — 36415 COLL VENOUS BLD VENIPUNCTURE: CPT

## 2017-11-13 PROCEDURE — 25000003 PHARM REV CODE 250: Performed by: PHYSICIAN ASSISTANT

## 2017-11-13 PROCEDURE — 85025 COMPLETE CBC W/AUTO DIFF WBC: CPT

## 2017-11-13 PROCEDURE — 99233 SBSQ HOSP IP/OBS HIGH 50: CPT | Mod: ,,, | Performed by: INTERNAL MEDICINE

## 2017-11-13 PROCEDURE — 63600175 PHARM REV CODE 636 W HCPCS: Performed by: NURSE PRACTITIONER

## 2017-11-13 PROCEDURE — 85610 PROTHROMBIN TIME: CPT

## 2017-11-13 PROCEDURE — 80048 BASIC METABOLIC PNL TOTAL CA: CPT | Mod: 91

## 2017-11-13 PROCEDURE — 99232 SBSQ HOSP IP/OBS MODERATE 35: CPT | Mod: ,,, | Performed by: PHYSICIAN ASSISTANT

## 2017-11-13 PROCEDURE — 80076 HEPATIC FUNCTION PANEL: CPT

## 2017-11-13 PROCEDURE — 21400001 HC TELEMETRY ROOM

## 2017-11-13 PROCEDURE — 25000003 PHARM REV CODE 250: Performed by: NURSE PRACTITIONER

## 2017-11-13 PROCEDURE — 25000003 PHARM REV CODE 250: Performed by: FAMILY MEDICINE

## 2017-11-13 PROCEDURE — 80048 BASIC METABOLIC PNL TOTAL CA: CPT

## 2017-11-13 RX ORDER — LACTULOSE 10 G/15ML
15 SOLUTION ORAL 3 TIMES DAILY
Status: DISCONTINUED | OUTPATIENT
Start: 2017-11-13 | End: 2017-11-15 | Stop reason: HOSPADM

## 2017-11-13 RX ADMIN — LACTULOSE 10 G: 10 SOLUTION ORAL at 02:11

## 2017-11-13 RX ADMIN — LACTULOSE 10 G: 10 SOLUTION ORAL at 05:11

## 2017-11-13 RX ADMIN — PREDNISONE 40 MG: 20 TABLET ORAL at 08:11

## 2017-11-13 RX ADMIN — CETIRIZINE HYDROCHLORIDE 10 MG: 10 TABLET, FILM COATED ORAL at 08:11

## 2017-11-13 RX ADMIN — SODIUM CHLORIDE: 0.9 INJECTION, SOLUTION INTRAVENOUS at 05:11

## 2017-11-13 RX ADMIN — LACTULOSE 15 G: 10 SOLUTION ORAL at 09:11

## 2017-11-13 RX ADMIN — PANTOPRAZOLE SODIUM 40 MG: 40 TABLET, DELAYED RELEASE ORAL at 08:11

## 2017-11-13 NOTE — SUBJECTIVE & OBJECTIVE
"Interval History: Pt was seen and examined. H/o reviewed. Pt is a 53 y/o female with alcoholic liver disease who was admitted for hyponatremia. Despite having reasons for dilutional hyponatremia, urine Na and U Osm are both high. Pt has received lasix as out pt at high doses. Lasix has been placed on hold, and pt has received IVF's. Pt has "some SOB" this am. Pt also says that she has paracentesis 2 days before admit.    Review of patient's allergies indicates:   Allergen Reactions    Ferrous sulfate Other (See Comments)     Patient states the pill makes her sick. She stated she would rather have a shot     Current Facility-Administered Medications   Medication Frequency    0.9%  NaCl infusion Continuous    cetirizine tablet 10 mg Daily    lactulose 20 gram/30 mL solution Soln 10 g TID    ondansetron disintegrating tablet 4 mg Q8H PRN    pantoprazole EC tablet 40 mg Daily    predniSONE tablet 40 mg Daily    ramelteon tablet 8 mg Nightly PRN       Objective:     Vital Signs (Most Recent):  Temp: 98 °F (36.7 °C) (11/13/17 0827)  Pulse: 89 (11/13/17 0827)  Resp: 18 (11/13/17 0827)  BP: (!) 117/58 (11/13/17 0827)  SpO2: 99 % (11/13/17 0827)  O2 Device (Oxygen Therapy): room air (11/13/17 0827) Vital Signs (24h Range):  Temp:  [97.8 °F (36.6 °C)-98.1 °F (36.7 °C)] 98 °F (36.7 °C)  Pulse:  [81-90] 89  Resp:  [17-18] 18  SpO2:  [97 %-100 %] 99 %  BP: (113-125)/(56-75) 117/58     Weight: 72 kg (158 lb 11.7 oz) (11/13/17 0552)  Body mass index is 26.41 kg/m².  Body surface area is 1.82 meters squared.    I/O last 3 completed shifts:  In: 3235.8 [P.O.:240; I.V.:2995.8]  Out: 450 [Urine:450]    Physical Exam   Constitutional: She is oriented to person, place, and time. She appears well-developed. No distress.   HENT:   Head: Normocephalic and atraumatic.   Mouth/Throat: Oropharynx is clear and moist. No oropharyngeal exudate.   Eyes: EOM are normal. Pupils are equal, round, and reactive to light.   Icteric sclera "   Neck: Normal range of motion. Neck supple. No JVD present. Carotid bruit is not present. No tracheal deviation present. No thyroid mass and no thyromegaly present.   Cardiovascular: Normal rate, regular rhythm, normal heart sounds and intact distal pulses.  Exam reveals no gallop and no friction rub.    No murmur heard.  Pulmonary/Chest: Effort normal and breath sounds normal. No respiratory distress. She has no wheezes. She has no rales. She exhibits no tenderness.   Abdominal: Soft. Bowel sounds are normal. She exhibits distension. She exhibits no abdominal bruit, no ascites and no mass. There is no hepatosplenomegaly. There is no tenderness. There is no rebound, no guarding and no CVA tenderness.   Musculoskeletal: Normal range of motion. She exhibits no edema or tenderness.   Lymphadenopathy:     She has no cervical adenopathy.   Neurological: She is alert and oriented to person, place, and time. She has normal reflexes. She displays normal reflexes. No cranial nerve deficit. She exhibits normal muscle tone. Coordination normal.   Skin: Skin is warm and intact. No rash noted. No erythema. No pallor.       Significant Labs: reviewed  BMP  Lab Results   Component Value Date     (L) 11/13/2017    K 4.2 11/13/2017    CL 96 11/13/2017    CO2 16 (L) 11/13/2017    BUN 9 11/13/2017    CREATININE 0.6 11/13/2017    CALCIUM 8.1 (L) 11/13/2017    ANIONGAP 8 11/13/2017    ESTGFRAFRICA >60 11/13/2017    EGFRNONAA >60 11/13/2017     Lab Results   Component Value Date    WBC 12.25 11/13/2017    HGB 10.3 (L) 11/13/2017    HCT 28.2 (L) 11/13/2017    MCV 78 (L) 11/13/2017    PLT 52 (L) 11/13/2017         Significant Imaging: reviewed

## 2017-11-13 NOTE — ASSESSMENT & PLAN NOTE
- MELD score of 29.  On liver transplant list.  - Continue home lactulose.  - LFT's increased from baseline at 232/199.  - Ammonia WNL at 33.  - Consult Dr. Souza/GI in AM.  - Avoid hepatotoxic agents.  -GI on board, lactulose ordered, last paracentesis 11/9/17, no AMS

## 2017-11-13 NOTE — ASSESSMENT & PLAN NOTE
- Secondary to ESLD.  - No evidence of active bleeding.  - INR/platelets stable.  Monitor.    11/12 - platelets and coags abnormal, hold off on VTE prophylaxis and follow

## 2017-11-13 NOTE — SUBJECTIVE & OBJECTIVE
Subjective:     Interval History:   The patient is feeling better because she said she finally slept well. She denies nausea, vomiting or abdominal pain. She hasn't had a BM in two days. IVF have been stopped by Nephrology.     Review of Systems   Constitutional:        See Interval History for daily ROS.      Objective:     Vital Signs (Most Recent):  Temp: 98.5 °F (36.9 °C) (11/13/17 1127)  Pulse: 92 (11/13/17 1127)  Resp: 20 (11/13/17 1127)  BP: (!) 121/56 (11/13/17 1127)  SpO2: 98 % (11/13/17 1127) Vital Signs (24h Range):  Temp:  [97.8 °F (36.6 °C)-98.5 °F (36.9 °C)] 98.5 °F (36.9 °C)  Pulse:  [81-92] 92  Resp:  [17-20] 20  SpO2:  [97 %-100 %] 98 %  BP: (116-125)/(56-75) 121/56     Weight: 72 kg (158 lb 11.7 oz) (11/13/17 0552)  Body mass index is 26.41 kg/m².      Intake/Output Summary (Last 24 hours) at 11/13/17 1406  Last data filed at 11/13/17 0553   Gross per 24 hour   Intake          1872.08 ml   Output                0 ml   Net          1872.08 ml       Lines/Drains/Airways     Peripheral Intravenous Line                 Peripheral IV - Single Lumen 11/12/17 0110 Right Forearm 1 day                Physical Exam   Constitutional: She is oriented to person, place, and time. She appears well-developed and well-nourished.   HENT:   Head: Normocephalic and atraumatic.   Eyes: EOM are normal. Scleral icterus is present.   Cardiovascular: Regular rhythm.    Pulmonary/Chest: Effort normal and breath sounds normal. No respiratory distress.   Abdominal: Soft. Bowel sounds are normal. She exhibits distension. There is no tenderness.   Neurological: She is alert and oriented to person, place, and time. No cranial nerve deficit.   Psychiatric: Her behavior is normal.       Significant Labs:  CBC:   Recent Labs  Lab 11/12/17  0604 11/13/17  0502   WBC 13.79* 12.25   HGB 11.4* 10.3*   HCT 30.4* 28.2*   PLT 57* 52*     CMP:   Recent Labs  Lab 11/13/17  0502   GLU 95   CALCIUM 8.1*   ALBUMIN 2.2*   PROT 5.6*   *    K 4.2   CO2 16*   CL 96   BUN 9   CREATININE 0.6   ALKPHOS 219*   *   *   BILITOT 11.5*     Coagulation:   Recent Labs  Lab 11/13/17  0502   INR 1.9*         Significant Imaging:  Imaging results within the past 24 hours have been reviewed.

## 2017-11-13 NOTE — ASSESSMENT & PLAN NOTE
- MELD score of 29.  On liver transplant list.  - Continue home lactulose.  - LFT's increased from baseline at 232/199.  - Ammonia WNL at 33.  - Consult Dr. Souza/GI in AM.  - Avoid hepatotoxic agents.    GI on board, lactulose ordered, last paracentesis 2d ago, no AMS    11/12 - as above

## 2017-11-13 NOTE — PROGRESS NOTES
[facility] Hospital Medicine  Progress Note    Patient Name: Marcie Bazan  MRN: [id number]  Patient Class: IP- Inpatient   Admission Date: 11/10/2017  Length of Stay: 3 days  Attending Physician: Ti Nuñez MD  Primary Care Provider: ODILIA Wall        Subjective:     Principal Problem:Hyponatremia    HPI:  Ms. Bazan is a 53 yo female  with a PMHx of alcoholic cirrhosis with ESLD (MELD score of 29) on liver transplant list, who was seen by Dr. Souza in clinic on 11/8 for nausea and fatigue.  She underwent therapeutic paracentesis 11/9, draining 2L.  At that time, labs were drawn and resulted a critical Na of 121.  Subsequently, she was instructed to come to ED for repeat labs and further evaluation.  Initial work-up in ED resulted Na 119, , , ammonia 33, INR 1.7, H&H 11.8/31.8, plt 109; VSS.  [facility] was consulted for admission.  Currently, patient appears comfortable, in NAD.  Denies any nausea currently, but reports fatigue persists.  Denies any lightheadedness/dizziness, syncope, AMS, visual disturbances, focal deficits, numbness/tingling, HA, seizure-like activity, chest pain, palpitations, SOB, cough, orthopnea, PND, edema, weight gain, ABD pain, N/V/D, dysuria, hematuria, fever, or chills.  Case discussed with Nephrology, who recommend 0.9% NS @ 75 mL/hr with BMP Q 8 hours.    Hospital Course:  Pt is a 53 y/o female with alcoholic liver disease who was admitted for hyponatremia. Sodium increasing very slowly. Hold diuretics. Restrict fluid intake to 1.5L daily. Gentle hydration. Lengthy discussion with renal, pt unlikely to return to normal Na, until liver transplant.  A goal of over 120 may be all that is realistic.  Platelets down to 57 abnormal coags, lovenox held. Hyponatremia likely due to overdiuresis/dehydration. Nephrology consult. Urinary sodium and osmality elevated.  will need to be held and f/u with Gi.  Pt not feeling girth has  increased or a need for paracenteses. Need to address elevated AFP with GI and if any further imaging or ca workup needed. Sodium today 120. IVF's stopped per Nephrology.     Interval History: No acute events overnight. Patient reports some SOB today. Sodium level 120 this AM. IVF's stopped. VSS. Will continue to monitor.    Review of Systems   Constitutional: Positive for fatigue. Negative for activity change, chills, diaphoresis, fever and unexpected weight change.   HENT: Negative for congestion, nosebleeds, postnasal drip, rhinorrhea, sinus pressure, sore throat and trouble swallowing.    Eyes: Negative for photophobia and visual disturbance.   Respiratory: Negative for apnea, cough, chest tightness, shortness of breath and wheezing.    Cardiovascular: Negative for chest pain, palpitations and leg swelling.   Gastrointestinal: Positive for nausea. Negative for abdominal distention, abdominal pain, blood in stool, constipation, diarrhea and vomiting.   Endocrine: Negative for polydipsia, polyphagia and polyuria.   Genitourinary: Negative for difficulty urinating, dysuria, frequency, hematuria and urgency.   Musculoskeletal: Negative for arthralgias, back pain, gait problem, joint swelling and myalgias.   Skin: Negative for pallor, rash and wound.   Neurological: Negative for dizziness, tremors, seizures, syncope, facial asymmetry, speech difficulty, weakness, light-headedness, numbness and headaches.   Psychiatric/Behavioral: Negative for agitation, confusion, hallucinations and sleep disturbance. The patient is not nervous/anxious.    All other systems reviewed and are negative.    Objective:     Vital Signs (Most Recent):  Temp: 98.5 °F (36.9 °C) (11/13/17 1127)  Pulse: 92 (11/13/17 1127)  Resp: 20 (11/13/17 1127)  BP: (!) 121/56 (11/13/17 1127)  SpO2: 98 % (11/13/17 1127) Vital Signs (24h Range):  Temp:  [97.8 °F (36.6 °C)-98.5 °F (36.9 °C)] 98.5 °F (36.9 °C)  Pulse:  [81-92] 92  Resp:  [17-20] 20  SpO2:  [97  %-100 %] 98 %  BP: (116-125)/(56-75) 121/56     Weight: 72 kg (158 lb 11.7 oz)  Body mass index is 26.41 kg/m².    Intake/Output Summary (Last 24 hours) at 11/13/17 1302  Last data filed at 11/13/17 0553   Gross per 24 hour   Intake          1992.08 ml   Output              200 ml   Net          1792.08 ml      Physical Exam   Constitutional: She is oriented to person, place, and time. She appears well-developed. No distress.   HENT:   Head: Normocephalic and atraumatic.   Mouth/Throat: Oropharynx is clear and moist. No oropharyngeal exudate.   Eyes: EOM are normal. Pupils are equal, round, and reactive to light.   Icteric sclera   Neck: Normal range of motion. Neck supple. No JVD present. Carotid bruit is not present. No tracheal deviation present. No thyroid mass and no thyromegaly present.   Cardiovascular: Normal rate, regular rhythm, normal heart sounds and intact distal pulses.  Exam reveals no gallop and no friction rub.    No murmur heard.  Pulmonary/Chest: Effort normal and breath sounds normal. No respiratory distress. She has no wheezes. She has no rales. She exhibits no tenderness.   Abdominal: Soft. Bowel sounds are normal. She exhibits distension. She exhibits no abdominal bruit, no ascites and no mass. There is no hepatosplenomegaly. There is no tenderness. There is no rebound, no guarding and no CVA tenderness.   Musculoskeletal: Normal range of motion. She exhibits no edema or tenderness.   Lymphadenopathy:     She has no cervical adenopathy.   Neurological: She is alert and oriented to person, place, and time. She has normal reflexes. She displays normal reflexes. No cranial nerve deficit. She exhibits normal muscle tone. Coordination normal.   Skin: Skin is warm and intact. No rash noted. No erythema. No pallor.       Significant Labs:   BMP:   Recent Labs  Lab 11/13/17  0502   GLU 95   *   K 4.2   CL 96   CO2 16*   BUN 9   CREATININE 0.6   CALCIUM 8.1*     CBC:   Recent Labs  Lab  11/12/17  0604 11/13/17  0502   WBC 13.79* 12.25   HGB 11.4* 10.3*   HCT 30.4* 28.2*   PLT 57* 52*     CMP:   Recent Labs  Lab 11/12/17  1639 11/13/17  0012 11/13/17  0502   * 120* 120*   K 4.3 4.4 4.2   CL 94* 96 96   CO2 18* 18* 16*   * 114* 95   BUN 12 9 9   CREATININE 0.7 0.6 0.6   CALCIUM 8.3* 8.2* 8.1*   PROT  --   --  5.6*   ALBUMIN  --   --  2.2*   BILITOT  --   --  11.5*   ALKPHOS  --   --  219*   AST  --   --  440*   ALT  --   --  392*   ANIONGAP 7* 6* 8   EGFRNONAA >60 >60 >60     Urine Culture: No results for input(s): LABURIN in the last 48 hours.  Urine Studies: No results for input(s): COLORU, APPEARANCEUA, PHUR, SPECGRAV, PROTEINUA, GLUCUA, KETONESU, BILIRUBINUA, OCCULTUA, NITRITE, UROBILINOGEN, LEUKOCYTESUR, RBCUA, WBCUA, BACTERIA, SQUAMEPITHEL, HYALINECASTS in the last 48 hours.    Invalid input(s): WRIGHTSUR  All pertinent labs within the past 24 hours have been reviewed.    Significant Imaging:   Imaging Results    None       Assessment/Plan:      * Hyponatremia    - Will admit to Inpatient.  - Currently, asymptomatic.  - Case discussed with Nephrology.  - 0.9% NS @ 75 mL/hr.  - BMP Q8 hours.  - Nephrology to evaluate in Am.  11/11 2/2 end state liver.  Nephrology on board.  Pt understands Tx will take time as slow changes to sodium are needed.    11/12 - 119 today, increase infusion rate of NS  11/13-Sodium 120, IVF's stopped        Alcoholic hepatitis with end-stage liver disease    - MELD score of 29.  On liver transplant list.  - Continue home lactulose.  - LFT's increased from baseline at 232/199.  - Ammonia WNL at 33.  - Consult Dr. Souza/GI in AM.  - Avoid hepatotoxic agents.  -GI on board, lactulose ordered, last paracentesis 11/9/17, no AMS            Coagulopathy    - Secondary to ESLD.  - No evidence of active bleeding.  - INR/platelets stable.  Monitor.    11/12 - platelets and coags abnormal, hold off on VTE prophylaxis and follow   -Platelets 52 today         Ascites     Continue to monitor. Once Na improves will consider restarting diuretics at a lower dose, but may need to continue to hold these.          Essential hypertension    - BP well controlled currently without antihypertensives.  - Will hold home BP meds to prevent hypotension. Monitor BP trends, and resume as needed.          VTE Risk Mitigation         Ordered     Medium Risk of VTE  Once      11/10/17 2347     Place sequential compression device  Until discontinued      11/10/17 2347     Reason for No Pharmacological VTE Prophylaxis  Once      11/10/17 2346              Vira Amador NP  Department of Hospital Medicine   [facility]

## 2017-11-13 NOTE — PROGRESS NOTES
[facility] Hospital Medicine  Progress Note    Patient Name: Marcie Bazan  MRN: [id number]  Patient Class: IP- Inpatient   Admission Date: 11/10/2017  Length of Stay: 2 days  Attending Physician: Gigi Lozano MD  Primary Care Provider: ODILIA Wall        Subjective:     Principal Problem:Hyponatremia    HPI:  Ms. Bazan is a 51 yo female  with a PMHx of alcoholic cirrhosis with ESLD (MELD score of 29) on liver transplant list, who was seen by Dr. Souza in clinic on 11/8 for nausea and fatigue.  She underwent therapeutic paracentesis 11/9, draining 2L.  At that time, labs were drawn and resulted a critical Na of 121.  Subsequently, she was instructed to come to ED for repeat labs and further evaluation.  Initial work-up in ED resulted Na 119, , , ammonia 33, INR 1.7, H&H 11.8/31.8, plt 109; VSS.  [facility] was consulted for admission.  Currently, patient appears comfortable, in NAD.  Denies any nausea currently, but reports fatigue persists.  Denies any lightheadedness/dizziness, syncope, AMS, visual disturbances, focal deficits, numbness/tingling, HA, seizure-like activity, chest pain, palpitations, SOB, cough, orthopnea, PND, edema, weight gain, ABD pain, N/V/D, dysuria, hematuria, fever, or chills.  Case discussed with Nephrology, who recommend 0.9% NS @ 75 mL/hr with BMP Q 8 hours.    Hospital Course:  Wants to go home, no bleeding, no increased abdominal girth.  11/12 - sodium increasing very slowly.  Lengthy discussion with renal, pt unlikely to return to normal Na, until liver transplant.  A goal of over 120 may be all that is realistic.  Platelets down to 57 abnormal coags, lovenox held.  Upon d/c diuretics (thought some component of hyponatremia is 2/2 dehydration)  will need to be held and f/u with Gi.  Pt not feeling girth has increased or a need for paracenteses.  Need to address elevated AFP with GI and if any further imaging or ca workup needed.   Will f/u ammonia, compliant with lactulose.          Review of Systems   Constitutional: Positive for fatigue. Negative for activity change, chills, diaphoresis, fever and unexpected weight change.   HENT: Negative for congestion, nosebleeds, postnasal drip, rhinorrhea, sinus pressure, sore throat and trouble swallowing.    Eyes: Negative for photophobia and visual disturbance.   Respiratory: Negative for apnea, cough, chest tightness, shortness of breath and wheezing.    Cardiovascular: Negative for chest pain, palpitations and leg swelling.   Gastrointestinal: Positive for nausea. Negative for abdominal distention, abdominal pain, blood in stool, constipation, diarrhea and vomiting.   Endocrine: Negative for polydipsia, polyphagia and polyuria.   Genitourinary: Negative for difficulty urinating, dysuria, frequency, hematuria and urgency.   Musculoskeletal: Negative for arthralgias, back pain, gait problem, joint swelling and myalgias.   Skin: Negative for pallor, rash and wound.   Neurological: Negative for dizziness, tremors, seizures, syncope, facial asymmetry, speech difficulty, weakness, light-headedness, numbness and headaches.   Psychiatric/Behavioral: Negative for agitation, confusion, hallucinations and sleep disturbance. The patient is not nervous/anxious.    All other systems reviewed and are negative.    Objective:     Vital Signs (Most Recent):  Temp: 98 °F (36.7 °C) (11/12/17 1633)  Pulse: 90 (11/12/17 1633)  Resp: 18 (11/12/17 1633)  BP: 125/64 (11/12/17 1633)  SpO2: 100 % (11/12/17 1633) Vital Signs (24h Range):  Temp:  [97.8 °F (36.6 °C)-98.2 °F (36.8 °C)] 98 °F (36.7 °C)  Pulse:  [77-92] 90  Resp:  [16-18] 18  SpO2:  [97 %-100 %] 100 %  BP: (113-129)/(56-64) 125/64     Weight: 68.9 kg (151 lb 14.4 oz)  Body mass index is 25.28 kg/m².    Intake/Output Summary (Last 24 hours) at 11/12/17 8948  Last data filed at 11/12/17 1736   Gross per 24 hour   Intake           2007.5 ml   Output               450 ml   Net           1557.5 ml      Physical Exam   Constitutional: She is oriented to person, place, and time. She appears well-developed and well-nourished. No distress.   No confusion or sedation.  No astrixis   HENT:   Head: Normocephalic and atraumatic.   Eyes: Conjunctivae and EOM are normal. Pupils are equal, round, and reactive to light.   Neck: Normal range of motion. Neck supple. No JVD present.   Cardiovascular: Normal rate, regular rhythm, S1 normal, S2 normal and intact distal pulses.  Exam reveals no gallop.    No murmur heard.  Pulmonary/Chest: Effort normal and breath sounds normal. No respiratory distress. She has no wheezes. She has no rales.   Abdominal: Soft. Bowel sounds are normal. She exhibits ascites. She exhibits no distension. There is no tenderness.   Musculoskeletal: Normal range of motion. She exhibits no tenderness or deformity.   Neurological: She is alert and oriented to person, place, and time. No cranial nerve deficit or sensory deficit.   Skin: Skin is warm and dry. Capillary refill takes less than 2 seconds. No rash noted. No erythema.   Psychiatric: She has a normal mood and affect. Judgment normal.   Nursing note and vitals reviewed.      Significant Labs: All pertinent labs within the past 24 hours have been reviewed.    Significant Imaging: I have reviewed and interpreted all pertinent imaging results/findings within the past 24 hours.    Assessment/Plan:      * Hyponatremia    - Will admit to Inpatient.  - Currently, asymptomatic.  - Case discussed with Nephrology.  - 0.9% NS @ 75 mL/hr.  - BMP Q8 hours.  - Nephrology to evaluate in Am.    11/11 2/2 end state liver.  Nephrology on board.  Pt understands Tx will take time as slow changes to sodium are needed.      11/12 - 119 today, increase infusion rate of NS        Alcoholic hepatitis with end-stage liver disease    - MELD score of 29.  On liver transplant list.  - Continue home lactulose.  - LFT's increased from baseline  at 232/199.  - Ammonia WNL at 33.  - Consult Dr. Souza/GI in AM.  - Avoid hepatotoxic agents.    GI on board, lactulose ordered, last paracentesis 2d ago, no AMS    11/12 - as above        Coagulopathy    - Secondary to ESLD.  - No evidence of active bleeding.  - INR/platelets stable.  Monitor.    11/12 - platelets and coags abnormal, hold off on VTE prophylaxis and follow         Ascites              Essential hypertension    - BP well controlled currently without antihypertensives.  - Will hold home BP meds to prevent hypotension.  Monitor BP trends, and resume as needed.          VTE Risk Mitigation         Ordered     Medium Risk of VTE  Once      11/10/17 2347     Place sequential compression device  Until discontinued      11/10/17 2347     Reason for No Pharmacological VTE Prophylaxis  Once      11/10/17 2346              Gigi Lozano MD  Department of Hospital Medicine   [facility]

## 2017-11-13 NOTE — ASSESSMENT & PLAN NOTE
- Will admit to Inpatient.  - Currently, asymptomatic.  - Case discussed with Nephrology.  - 0.9% NS @ 75 mL/hr.  - BMP Q8 hours.  - Nephrology to evaluate in Am.  11/11 2/2 end state liver.  Nephrology on board.  Pt understands Tx will take time as slow changes to sodium are needed.    11/12 - 119 today, increase infusion rate of NS  11/13-Sodium 120, IVF's stopped

## 2017-11-13 NOTE — ASSESSMENT & PLAN NOTE
- Secondary to ESLD.  - No evidence of active bleeding.  - INR/platelets stable.  Monitor.    11/12 - platelets and coags abnormal, hold off on VTE prophylaxis and follow   -Platelets 52 today

## 2017-11-13 NOTE — ASSESSMENT & PLAN NOTE
- Will admit to Inpatient.  - Currently, asymptomatic.  - Case discussed with Nephrology.  - 0.9% NS @ 75 mL/hr.  - BMP Q8 hours.  - Nephrology to evaluate in Am.    11/11 2/2 end state liver.  Nephrology on board.  Pt understands Tx will take time as slow changes to sodium are needed.      11/12 - 119 today, increase infusion rate of NS

## 2017-11-13 NOTE — PROGRESS NOTES
"[facility]  Nephrology  Progress Note    Patient Name: Marcie Bazan  MRN: [id number]  Admission Date: 11/10/2017  Hospital Length of Stay: 3 days  Attending Provider: Ti Nuñez MD   Primary Care Physician: ODILIA Wall  Principal Problem:Hyponatremia    Subjective:     HPI: Marcie Bazan is a pleasant 52 y old AA woman with h/o ESLD from alcoholic hepatitis admitted for weakness and hyponatremia, serum sodium is 118 , was about 126 a month ago , progressive decline in serum sodium levels noted , she had paracentesis for about 2.5 L 3 days ago , her diuretics were held on admission and started in IV fluids,     Interval History: Pt was seen and examined. H/o reviewed. Pt is a 53 y/o female with alcoholic liver disease who was admitted for hyponatremia. Despite having reasons for dilutional hyponatremia, urine Na and U Osm are both high. Pt has received lasix as out pt at high doses. Lasix has been placed on hold, and pt has received IVF's. Pt has "some SOB" this am. Pt also says that she has paracentesis 2 days before admit.    Review of patient's allergies indicates:   Allergen Reactions    Ferrous sulfate Other (See Comments)     Patient states the pill makes her sick. She stated she would rather have a shot     Current Facility-Administered Medications   Medication Frequency    0.9%  NaCl infusion Continuous    cetirizine tablet 10 mg Daily    lactulose 20 gram/30 mL solution Soln 10 g TID    ondansetron disintegrating tablet 4 mg Q8H PRN    pantoprazole EC tablet 40 mg Daily    predniSONE tablet 40 mg Daily    ramelteon tablet 8 mg Nightly PRN       Objective:     Vital Signs (Most Recent):  Temp: 98 °F (36.7 °C) (11/13/17 0827)  Pulse: 89 (11/13/17 0827)  Resp: 18 (11/13/17 0827)  BP: (!) 117/58 (11/13/17 0827)  SpO2: 99 % (11/13/17 0827)  O2 Device (Oxygen Therapy): room air (11/13/17 0827) Vital Signs (24h Range):  Temp:  [97.8 °F (36.6 °C)-98.1 °F (36.7 °C)] 98 " °F (36.7 °C)  Pulse:  [81-90] 89  Resp:  [17-18] 18  SpO2:  [97 %-100 %] 99 %  BP: (113-125)/(56-75) 117/58     Weight: 72 kg (158 lb 11.7 oz) (11/13/17 0552)  Body mass index is 26.41 kg/m².  Body surface area is 1.82 meters squared.    I/O last 3 completed shifts:  In: 3235.8 [P.O.:240; I.V.:2995.8]  Out: 450 [Urine:450]    Physical Exam   Constitutional: She is oriented to person, place, and time. She appears well-developed. No distress.   HENT:   Head: Normocephalic and atraumatic.   Mouth/Throat: Oropharynx is clear and moist. No oropharyngeal exudate.   Eyes: EOM are normal. Pupils are equal, round, and reactive to light.   Icteric sclera   Neck: Normal range of motion. Neck supple. No JVD present. Carotid bruit is not present. No tracheal deviation present. No thyroid mass and no thyromegaly present.   Cardiovascular: Normal rate, regular rhythm, normal heart sounds and intact distal pulses.  Exam reveals no gallop and no friction rub.    No murmur heard.  Pulmonary/Chest: Effort normal and breath sounds normal. No respiratory distress. She has no wheezes. She has no rales. She exhibits no tenderness.   Abdominal: Soft. Bowel sounds are normal. She exhibits distension. She exhibits no abdominal bruit, no ascites and no mass. There is no hepatosplenomegaly. There is no tenderness. There is no rebound, no guarding and no CVA tenderness.   Musculoskeletal: Normal range of motion. She exhibits no edema or tenderness.   Lymphadenopathy:     She has no cervical adenopathy.   Neurological: She is alert and oriented to person, place, and time. She has normal reflexes. She displays normal reflexes. No cranial nerve deficit. She exhibits normal muscle tone. Coordination normal.   Skin: Skin is warm and intact. No rash noted. No erythema. No pallor.       Significant Labs: reviewed  BMP  Lab Results   Component Value Date     (L) 11/13/2017    K 4.2 11/13/2017    CL 96 11/13/2017    CO2 16 (L) 11/13/2017    BUN 9  11/13/2017    CREATININE 0.6 11/13/2017    CALCIUM 8.1 (L) 11/13/2017    ANIONGAP 8 11/13/2017    ESTGFRAFRICA >60 11/13/2017    EGFRNONAA >60 11/13/2017     Lab Results   Component Value Date    WBC 12.25 11/13/2017    HGB 10.3 (L) 11/13/2017    HCT 28.2 (L) 11/13/2017    MCV 78 (L) 11/13/2017    PLT 52 (L) 11/13/2017         Significant Imaging: reviewed    Assessment/Plan:   51 y/o female with hyponatremia and liver disease:    * Hyponatremia    1. Hyponatremia : despite having liver disease and reasons for dilutional hyponatremia, pt presents with elevated urinary Na and Osm.  Agree hyponatremia likely due to overdiuresis/dehydration.  s Na not worse  Has received NS IVF's  Has SOB this am.  Will d/c NS IBF's  Likely will need to re-start lasix, but in lower doses, in the next few days,     2. HTN : BP controlled    3. Stable Hb    4. Thrombocytopenia consistent with ESLD             Yannick Rivera MD  Nephrology  [facility]

## 2017-11-13 NOTE — SUBJECTIVE & OBJECTIVE
Interval History: No acute events overnight. Patient reports some SOB today. Sodium level 120 this AM. IVF's stopped. VSS. Will continue to monitor.    Review of Systems   Constitutional: Positive for fatigue. Negative for activity change, chills, diaphoresis, fever and unexpected weight change.   HENT: Negative for congestion, nosebleeds, postnasal drip, rhinorrhea, sinus pressure, sore throat and trouble swallowing.    Eyes: Negative for photophobia and visual disturbance.   Respiratory: Negative for apnea, cough, chest tightness, shortness of breath and wheezing.    Cardiovascular: Negative for chest pain, palpitations and leg swelling.   Gastrointestinal: Positive for nausea. Negative for abdominal distention, abdominal pain, blood in stool, constipation, diarrhea and vomiting.   Endocrine: Negative for polydipsia, polyphagia and polyuria.   Genitourinary: Negative for difficulty urinating, dysuria, frequency, hematuria and urgency.   Musculoskeletal: Negative for arthralgias, back pain, gait problem, joint swelling and myalgias.   Skin: Negative for pallor, rash and wound.   Neurological: Negative for dizziness, tremors, seizures, syncope, facial asymmetry, speech difficulty, weakness, light-headedness, numbness and headaches.   Psychiatric/Behavioral: Negative for agitation, confusion, hallucinations and sleep disturbance. The patient is not nervous/anxious.    All other systems reviewed and are negative.    Objective:     Vital Signs (Most Recent):  Temp: 98.5 °F (36.9 °C) (11/13/17 1127)  Pulse: 92 (11/13/17 1127)  Resp: 20 (11/13/17 1127)  BP: (!) 121/56 (11/13/17 1127)  SpO2: 98 % (11/13/17 1127) Vital Signs (24h Range):  Temp:  [97.8 °F (36.6 °C)-98.5 °F (36.9 °C)] 98.5 °F (36.9 °C)  Pulse:  [81-92] 92  Resp:  [17-20] 20  SpO2:  [97 %-100 %] 98 %  BP: (116-125)/(56-75) 121/56     Weight: 72 kg (158 lb 11.7 oz)  Body mass index is 26.41 kg/m².    Intake/Output Summary (Last 24 hours) at 11/13/17 1302  Last  data filed at 11/13/17 0553   Gross per 24 hour   Intake          1992.08 ml   Output              200 ml   Net          1792.08 ml      Physical Exam   Constitutional: She is oriented to person, place, and time. She appears well-developed. No distress.   HENT:   Head: Normocephalic and atraumatic.   Mouth/Throat: Oropharynx is clear and moist. No oropharyngeal exudate.   Eyes: EOM are normal. Pupils are equal, round, and reactive to light.   Icteric sclera   Neck: Normal range of motion. Neck supple. No JVD present. Carotid bruit is not present. No tracheal deviation present. No thyroid mass and no thyromegaly present.   Cardiovascular: Normal rate, regular rhythm, normal heart sounds and intact distal pulses.  Exam reveals no gallop and no friction rub.    No murmur heard.  Pulmonary/Chest: Effort normal and breath sounds normal. No respiratory distress. She has no wheezes. She has no rales. She exhibits no tenderness.   Abdominal: Soft. Bowel sounds are normal. She exhibits distension. She exhibits no abdominal bruit, no ascites and no mass. There is no hepatosplenomegaly. There is no tenderness. There is no rebound, no guarding and no CVA tenderness.   Musculoskeletal: Normal range of motion. She exhibits no edema or tenderness.   Lymphadenopathy:     She has no cervical adenopathy.   Neurological: She is alert and oriented to person, place, and time. She has normal reflexes. She displays normal reflexes. No cranial nerve deficit. She exhibits normal muscle tone. Coordination normal.   Skin: Skin is warm and intact. No rash noted. No erythema. No pallor.       Significant Labs:   BMP:   Recent Labs  Lab 11/13/17  0502   GLU 95   *   K 4.2   CL 96   CO2 16*   BUN 9   CREATININE 0.6   CALCIUM 8.1*     CBC:   Recent Labs  Lab 11/12/17  0604 11/13/17  0502   WBC 13.79* 12.25   HGB 11.4* 10.3*   HCT 30.4* 28.2*   PLT 57* 52*     CMP:   Recent Labs  Lab 11/12/17  1639 11/13/17  0012 11/13/17  0502   * 120*  120*   K 4.3 4.4 4.2   CL 94* 96 96   CO2 18* 18* 16*   * 114* 95   BUN 12 9 9   CREATININE 0.7 0.6 0.6   CALCIUM 8.3* 8.2* 8.1*   PROT  --   --  5.6*   ALBUMIN  --   --  2.2*   BILITOT  --   --  11.5*   ALKPHOS  --   --  219*   AST  --   --  440*   ALT  --   --  392*   ANIONGAP 7* 6* 8   EGFRNONAA >60 >60 >60     Urine Culture: No results for input(s): LABURIN in the last 48 hours.  Urine Studies: No results for input(s): COLORU, APPEARANCEUA, PHUR, SPECGRAV, PROTEINUA, GLUCUA, KETONESU, BILIRUBINUA, OCCULTUA, NITRITE, UROBILINOGEN, LEUKOCYTESUR, RBCUA, WBCUA, BACTERIA, SQUAMEPITHEL, HYALINECASTS in the last 48 hours.    Invalid input(s): DANILO  All pertinent labs within the past 24 hours have been reviewed.    Significant Imaging:   Imaging Results    None

## 2017-11-13 NOTE — ASSESSMENT & PLAN NOTE
Likely from combination of diuretics, volume depletion (from diarrhea, diuretics, recent paracentesis). Hold diuretics. Restrict fluid intake to 1.5L daily. Nephrology also following. Continue current treatment.   - Na essentially unchanged  - Continue fluid restriction  - Continue to hold diuretics

## 2017-11-13 NOTE — PLAN OF CARE
Requested from PA for hospitalist that order be placed for PT to evaluate to teach patient how to use rolling walker.

## 2017-11-13 NOTE — ASSESSMENT & PLAN NOTE
Continue prednisone. Liver function poor but stable. Repeat labs in AM.  The patient hasn't had a BM in two days. I will increase her Lactulose.     MELD-Na score: 30 at 11/13/2017  5:02 AM  MELD score: 23 at 11/13/2017  5:02 AM  Calculated from:  Serum Creatinine: 0.6 mg/dL (Rounded to 1) at 11/13/2017  5:02 AM  Serum Sodium: 120 mmol/L (Rounded to 125) at 11/13/2017  5:02 AM  Total Bilirubin: 11.5 mg/dL at 11/13/2017  5:02 AM  INR(ratio): 1.9 at 11/13/2017  5:02 AM  Age: 52 years

## 2017-11-13 NOTE — PLAN OF CARE
Problem: Patient Care Overview  Goal: Plan of Care Review  Outcome: Ongoing (interventions implemented as appropriate)  Plan of care reviewed and discussed with daughter.  No acute distress noted.  Safety and fall precautions reviewed and discussed with patient.  Patient free from injury.  Personal walker at bedside.  Denies pain.  IV hydration completed.  Oral hydration and ambulation promoted.  Call bell and personal items in reach.  Bed in low position and locked.  Side rails up x2.  Encouraged to call if any assistance needed.  Will continue to monitor.

## 2017-11-13 NOTE — PROGRESS NOTES
[facility]  Gastroenterology  Progress Note    Patient Name: Marcie Bazan  MRN: [id number]  Admission Date: 11/10/2017  Hospital Length of Stay: 3 days  Code Status: Full Code   Attending Provider: Ti Nuñez MD  Consulting Provider: Nabil Acosta PA-C  Primary Care Physician: ODILIA Wall  Principal Problem: Hyponatremia      Subjective:     Interval History:   The patient is feeling better because she said she finally slept well. She denies nausea, vomiting or abdominal pain. She hasn't had a BM in two days. IVF have been stopped by Nephrology.     Review of Systems   Constitutional:        See Interval History for daily ROS.      Objective:     Vital Signs (Most Recent):  Temp: 98.5 °F (36.9 °C) (11/13/17 1127)  Pulse: 92 (11/13/17 1127)  Resp: 20 (11/13/17 1127)  BP: (!) 121/56 (11/13/17 1127)  SpO2: 98 % (11/13/17 1127) Vital Signs (24h Range):  Temp:  [97.8 °F (36.6 °C)-98.5 °F (36.9 °C)] 98.5 °F (36.9 °C)  Pulse:  [81-92] 92  Resp:  [17-20] 20  SpO2:  [97 %-100 %] 98 %  BP: (116-125)/(56-75) 121/56     Weight: 72 kg (158 lb 11.7 oz) (11/13/17 0552)  Body mass index is 26.41 kg/m².      Intake/Output Summary (Last 24 hours) at 11/13/17 1406  Last data filed at 11/13/17 0553   Gross per 24 hour   Intake          1872.08 ml   Output                0 ml   Net          1872.08 ml       Lines/Drains/Airways     Peripheral Intravenous Line                 Peripheral IV - Single Lumen 11/12/17 0110 Right Forearm 1 day                Physical Exam   Constitutional: She is oriented to person, place, and time. She appears well-developed and well-nourished.   HENT:   Head: Normocephalic and atraumatic.   Eyes: EOM are normal. Scleral icterus is present.   Cardiovascular: Regular rhythm.    Pulmonary/Chest: Effort normal and breath sounds normal. No respiratory distress.   Abdominal: Soft. Bowel sounds are normal. She exhibits distension. There is no tenderness.   Neurological: She  is alert and oriented to person, place, and time. No cranial nerve deficit.   Psychiatric: Her behavior is normal.       Significant Labs:  CBC:   Recent Labs  Lab 11/12/17  0604 11/13/17  0502   WBC 13.79* 12.25   HGB 11.4* 10.3*   HCT 30.4* 28.2*   PLT 57* 52*     CMP:   Recent Labs  Lab 11/13/17  0502   GLU 95   CALCIUM 8.1*   ALBUMIN 2.2*   PROT 5.6*   *   K 4.2   CO2 16*   CL 96   BUN 9   CREATININE 0.6   ALKPHOS 219*   *   *   BILITOT 11.5*     Coagulation:   Recent Labs  Lab 11/13/17  0502   INR 1.9*         Significant Imaging:  Imaging results within the past 24 hours have been reviewed.    Assessment/Plan:     * Hyponatremia    Likely from combination of diuretics, volume depletion (from diarrhea, diuretics, recent paracentesis). Hold diuretics. Restrict fluid intake to 1.5L daily. Nephrology also following. Continue current treatment.   - Na essentially unchanged  - Continue fluid restriction  - Continue to hold diuretics        Alcoholic hepatitis with end-stage liver disease    Continue prednisone. Liver function poor but stable. Repeat labs in AM.  The patient hasn't had a BM in two days. I will increase her Lactulose.     MELD-Na score: 30 at 11/13/2017  5:02 AM  MELD score: 23 at 11/13/2017  5:02 AM  Calculated from:  Serum Creatinine: 0.6 mg/dL (Rounded to 1) at 11/13/2017  5:02 AM  Serum Sodium: 120 mmol/L (Rounded to 125) at 11/13/2017  5:02 AM  Total Bilirubin: 11.5 mg/dL at 11/13/2017  5:02 AM  INR(ratio): 1.9 at 11/13/2017  5:02 AM  Age: 52 years          Ascites    Continue to monitor. Once Na improves will consider restarting diuretics at a lower dose, but may need to continue to hold these.            Thank you for your consult. I will follow-up with patient. Please contact us if you have any additional questions.    Nabil Acosta PA-C  Gastroenterology  [facility]

## 2017-11-14 LAB
ANION GAP SERPL CALC-SCNC: 7 MMOL/L
ANION GAP SERPL CALC-SCNC: 9 MMOL/L
ANISOCYTOSIS BLD QL SMEAR: SLIGHT
BASOPHILS # BLD AUTO: 0.02 K/UL
BASOPHILS NFR BLD: 0.1 %
BUN SERPL-MCNC: 8 MG/DL
BUN SERPL-MCNC: 9 MG/DL
CALCIUM SERPL-MCNC: 8.5 MG/DL
CALCIUM SERPL-MCNC: 8.9 MG/DL
CHLORIDE SERPL-SCNC: 93 MMOL/L
CHLORIDE SERPL-SCNC: 96 MMOL/L
CO2 SERPL-SCNC: 18 MMOL/L
CO2 SERPL-SCNC: 19 MMOL/L
CREAT SERPL-MCNC: 0.6 MG/DL
CREAT SERPL-MCNC: 0.6 MG/DL
DIFFERENTIAL METHOD: ABNORMAL
EOSINOPHIL # BLD AUTO: 0.1 K/UL
EOSINOPHIL NFR BLD: 1 %
ERYTHROCYTE [DISTWIDTH] IN BLOOD BY AUTOMATED COUNT: 24 %
EST. GFR  (AFRICAN AMERICAN): >60 ML/MIN/1.73 M^2
EST. GFR  (AFRICAN AMERICAN): >60 ML/MIN/1.73 M^2
EST. GFR  (NON AFRICAN AMERICAN): >60 ML/MIN/1.73 M^2
EST. GFR  (NON AFRICAN AMERICAN): >60 ML/MIN/1.73 M^2
GLUCOSE SERPL-MCNC: 105 MG/DL
GLUCOSE SERPL-MCNC: 118 MG/DL
HCT VFR BLD AUTO: 31 %
HGB BLD-MCNC: 11.4 G/DL
INR PPP: 1.9
LYMPHOCYTES # BLD AUTO: 3 K/UL
LYMPHOCYTES NFR BLD: 22.1 %
MCH RBC QN AUTO: 28.5 PG
MCHC RBC AUTO-ENTMCNC: 36.8 G/DL
MCV RBC AUTO: 78 FL
MONOCYTES # BLD AUTO: 1 K/UL
MONOCYTES NFR BLD: 7.6 %
NEUTROPHILS # BLD AUTO: 9.4 K/UL
NEUTROPHILS NFR BLD: 69.5 %
PLATELET # BLD AUTO: 78 K/UL
PLATELET BLD QL SMEAR: ABNORMAL
PMV BLD AUTO: ABNORMAL FL
POIKILOCYTOSIS BLD QL SMEAR: ABNORMAL
POTASSIUM SERPL-SCNC: 3.7 MMOL/L
POTASSIUM SERPL-SCNC: 4.2 MMOL/L
PROTHROMBIN TIME: 18.8 SEC
RBC # BLD AUTO: 4 M/UL
SODIUM SERPL-SCNC: 121 MMOL/L
SODIUM SERPL-SCNC: 121 MMOL/L
TARGETS BLD QL SMEAR: ABNORMAL
WBC # BLD AUTO: 13.52 K/UL

## 2017-11-14 PROCEDURE — 80048 BASIC METABOLIC PNL TOTAL CA: CPT

## 2017-11-14 PROCEDURE — 25000003 PHARM REV CODE 250: Performed by: PHYSICIAN ASSISTANT

## 2017-11-14 PROCEDURE — 21400001 HC TELEMETRY ROOM

## 2017-11-14 PROCEDURE — G8979 MOBILITY GOAL STATUS: HCPCS | Mod: CH

## 2017-11-14 PROCEDURE — 85610 PROTHROMBIN TIME: CPT

## 2017-11-14 PROCEDURE — 97162 PT EVAL MOD COMPLEX 30 MIN: CPT

## 2017-11-14 PROCEDURE — 25000003 PHARM REV CODE 250: Performed by: NURSE PRACTITIONER

## 2017-11-14 PROCEDURE — 99232 SBSQ HOSP IP/OBS MODERATE 35: CPT | Mod: ,,, | Performed by: INTERNAL MEDICINE

## 2017-11-14 PROCEDURE — 25000003 PHARM REV CODE 250: Performed by: INTERNAL MEDICINE

## 2017-11-14 PROCEDURE — 97116 GAIT TRAINING THERAPY: CPT

## 2017-11-14 PROCEDURE — 63600175 PHARM REV CODE 636 W HCPCS: Performed by: NURSE PRACTITIONER

## 2017-11-14 PROCEDURE — 36415 COLL VENOUS BLD VENIPUNCTURE: CPT

## 2017-11-14 PROCEDURE — G8980 MOBILITY D/C STATUS: HCPCS | Mod: CH

## 2017-11-14 PROCEDURE — G8978 MOBILITY CURRENT STATUS: HCPCS | Mod: CH

## 2017-11-14 PROCEDURE — 85025 COMPLETE CBC W/AUTO DIFF WBC: CPT

## 2017-11-14 RX ORDER — BISACODYL 5 MG
5 TABLET, DELAYED RELEASE (ENTERIC COATED) ORAL ONCE
Status: COMPLETED | OUTPATIENT
Start: 2017-11-14 | End: 2017-11-14

## 2017-11-14 RX ADMIN — LACTULOSE 15 G: 10 SOLUTION ORAL at 02:11

## 2017-11-14 RX ADMIN — CETIRIZINE HYDROCHLORIDE 10 MG: 10 TABLET, FILM COATED ORAL at 09:11

## 2017-11-14 RX ADMIN — BISACODYL 5 MG: 5 TABLET, COATED ORAL at 04:11

## 2017-11-14 RX ADMIN — PANTOPRAZOLE SODIUM 40 MG: 40 TABLET, DELAYED RELEASE ORAL at 09:11

## 2017-11-14 RX ADMIN — LACTULOSE 15 G: 10 SOLUTION ORAL at 05:11

## 2017-11-14 RX ADMIN — LACTULOSE 15 G: 10 SOLUTION ORAL at 09:11

## 2017-11-14 RX ADMIN — PREDNISONE 40 MG: 20 TABLET ORAL at 09:11

## 2017-11-14 RX ADMIN — ONDANSETRON 4 MG: 4 TABLET, ORALLY DISINTEGRATING ORAL at 05:11

## 2017-11-14 NOTE — ASSESSMENT & PLAN NOTE
1. Hyponatremia : due to ESLD , was about 126 a month ago ,  121  today , can be due to dehydration, can resume diuretics as out pt in few days, needs f/u in GI and Nephrology depts in few days ,     Also hyponatremia can be an indicator of poor prognosis in this woman , will follow labs ,     2. HTN : BP controlled    3. Stable Hb    4. Thrombocytopenia consistent with ESLD

## 2017-11-14 NOTE — PLAN OF CARE
Problem: Patient Care Overview  Goal: Plan of Care Review  PT IS RAUL WITH ROLLATOR AND WILL BE D/C TO MOBILITY PROGRAM     Outcome: Ongoing (interventions implemented as appropriate)  Pt remains free of falls and free of injury thus far this shift. Labs monitored; no complaints at this time.

## 2017-11-14 NOTE — ASSESSMENT & PLAN NOTE
Continue to monitor. Once Na improves will consider restarting diuretics at a lower dose, continue to hold diuretics.

## 2017-11-14 NOTE — ASSESSMENT & PLAN NOTE
Likely from combination of diuretics, volume depletion (from diarrhea, diuretics, recent paracentesis). Hold diuretics. Restrict fluid intake to 1.5L daily. Nephrology also following. Continue current treatment.   - Na slowly trending up. Goal around 125  - Continue fluid restriction  - Continue to hold diuretics

## 2017-11-14 NOTE — SUBJECTIVE & OBJECTIVE
Interval History:  No acute events , feels fine, no complaints ,     Review of patient's allergies indicates:   Allergen Reactions    Ferrous sulfate Other (See Comments)     Patient states the pill makes her sick. She stated she would rather have a shot     Current Facility-Administered Medications   Medication Frequency    cetirizine tablet 10 mg Daily    lactulose 20 gram/30 mL solution Soln 15 g TID    ondansetron disintegrating tablet 4 mg Q8H PRN    pantoprazole EC tablet 40 mg Daily    predniSONE tablet 40 mg Daily    ramelteon tablet 8 mg Nightly PRN       Objective:     Vital Signs (Most Recent):  Temp: 97.9 °F (36.6 °C) (11/14/17 0744)  Pulse: 85 (11/14/17 0744)  Resp: 17 (11/14/17 0744)  BP: (!) 106/56 (11/14/17 0744)  SpO2: 100 % (11/14/17 0744)  O2 Device (Oxygen Therapy): room air (11/14/17 0411) Vital Signs (24h Range):  Temp:  [97.8 °F (36.6 °C)-98.5 °F (36.9 °C)] 97.9 °F (36.6 °C)  Pulse:  [80-92] 85  Resp:  [16-20] 17  SpO2:  [96 %-100 %] 100 %  BP: (106-132)/(56-86) 106/56     Weight: 72 kg (158 lb 11.7 oz) (11/14/17 0411)  Body mass index is 26.41 kg/m².  Body surface area is 1.82 meters squared.    I/O last 3 completed shifts:  In: 1228.3 [I.V.:1228.3]  Out: 500 [Urine:500]    Physical Exam   Constitutional: She is oriented to person, place, and time. She appears well-developed. No distress.   HENT:   Head: Normocephalic and atraumatic.   Mouth/Throat: Oropharynx is clear and moist. No oropharyngeal exudate.   Eyes: EOM are normal. Pupils are equal, round, and reactive to light.   Icteric sclera   Neck: Normal range of motion. Neck supple. No JVD present. Carotid bruit is not present. No tracheal deviation present. No thyroid mass and no thyromegaly present.   Cardiovascular: Normal rate, regular rhythm, normal heart sounds and intact distal pulses.  Exam reveals no gallop and no friction rub.    No murmur heard.  Pulmonary/Chest: Effort normal and breath sounds normal. No respiratory  distress. She has no wheezes. She has no rales. She exhibits no tenderness.   Abdominal: Soft. Bowel sounds are normal. She exhibits distension. She exhibits no abdominal bruit, no ascites and no mass. There is no hepatosplenomegaly. There is no tenderness. There is no rebound, no guarding and no CVA tenderness.   Musculoskeletal: Normal range of motion. She exhibits no edema or tenderness.   Lymphadenopathy:     She has no cervical adenopathy.   Neurological: She is alert and oriented to person, place, and time. She has normal reflexes. She displays normal reflexes. No cranial nerve deficit. She exhibits normal muscle tone. Coordination normal.   Skin: Skin is warm and intact. No rash noted. No erythema. No pallor.       Significant Labs:  CBC:   Recent Labs  Lab 11/14/17  0554   WBC 13.52*   RBC 4.00   HGB 11.4*   HCT 31.0*   PLT 78*   MCV 78*   MCH 28.5   MCHC 36.8*     CMP:   Recent Labs  Lab 11/13/17  0502  11/14/17  0845   GLU 95  < > 105   CALCIUM 8.1*  < > 8.9   ALBUMIN 2.2*  --   --    PROT 5.6*  --   --    *  < > 121*   K 4.2  < > 3.7   CO2 16*  < > 19*   CL 96  < > 93*   BUN 9  < > 9   CREATININE 0.6  < > 0.6   ALKPHOS 219*  --   --    *  --   --    *  --   --    BILITOT 11.5*  --   --    < > = values in this interval not displayed.  Coagulation:   Recent Labs  Lab 11/09/17  1357  11/14/17  0554   INR 1.7*  < > 1.9*   APTT 32.2*  --   --    < > = values in this interval not displayed.  All labs within the past 24 hours have been reviewed.       Lab Results   Component Value Date    CALCIUM 8.9 11/14/2017    PHOS 3.3 11/08/2017       Lab Results   Component Value Date    ALBUMIN 2.2 (L) 11/13/2017       Significant Imaging: reviewed

## 2017-11-14 NOTE — ASSESSMENT & PLAN NOTE
- Will admit to Inpatient.  - Currently, asymptomatic.  - Case discussed with Nephrology.  - BMP Q8 hours.  11/11 2/2 end state liver.  Nephrology on board.  Pt understands Tx will take time as slow changes to sodium are needed.    11/12 - 119 today, increase infusion rate of NS  11/13-Sodium 120, IVF's stopped per Nephrology   -Na level improving slowly   -Will continue current plan

## 2017-11-14 NOTE — ASSESSMENT & PLAN NOTE
Continue prednisone. Liver function poor but stable. Repeat labs in AM.  Continue current care.     MELD-Na score: 30 at 11/14/2017  8:45 AM  MELD score: 23 at 11/14/2017  8:45 AM  Calculated from:  Serum Creatinine: 0.6 mg/dL (Rounded to 1) at 11/14/2017  8:45 AM  Serum Sodium: 121 mmol/L (Rounded to 125) at 11/14/2017  8:45 AM  Total Bilirubin: 11.5 mg/dL at 11/13/2017  5:02 AM  INR(ratio): 1.9 at 11/14/2017  5:54 AM  Age: 52 years

## 2017-11-14 NOTE — SUBJECTIVE & OBJECTIVE
Subjective:     Interval History:   The patient reports poor appetite. She says she isn't eating much and it is mostly soft foods. She reports some dysphagia today. Mostly with pills. She said is feels like it won't go down. She points to her neck. She denies nausea, vomiting or abdominal pain. The patient has had a BM. Sodium slowly tending up, but still only 121 today.     Review of Systems   Constitutional:        See Interval History for daily ROS.      Objective:     Vital Signs (Most Recent):  Temp: 98.3 °F (36.8 °C) (11/14/17 1205)  Pulse: 89 (11/14/17 1205)  Resp: 18 (11/14/17 1205)  BP: 126/60 (11/14/17 1205)  SpO2: 100 % (11/14/17 1205) Vital Signs (24h Range):  Temp:  [97.8 °F (36.6 °C)-98.5 °F (36.9 °C)] 98.3 °F (36.8 °C)  Pulse:  [80-89] 89  Resp:  [16-18] 18  SpO2:  [96 %-100 %] 100 %  BP: (106-132)/(56-86) 126/60     Weight: 72 kg (158 lb 11.7 oz) (11/14/17 0411)  Body mass index is 26.41 kg/m².      Intake/Output Summary (Last 24 hours) at 11/14/17 1441  Last data filed at 11/13/17 2202   Gross per 24 hour   Intake                0 ml   Output              500 ml   Net             -500 ml       Lines/Drains/Airways     Peripheral Intravenous Line                 Peripheral IV - Single Lumen 11/12/17 0110 Right Forearm 2 days                Physical Exam   Constitutional: She is oriented to person, place, and time. She appears well-developed. No distress.   HENT:   Head: Normocephalic and atraumatic.   Cardiovascular: Normal rate and regular rhythm.    Pulmonary/Chest: Effort normal and breath sounds normal. No respiratory distress.   Abdominal: Soft. Bowel sounds are normal. She exhibits no distension. There is no tenderness.   Neurological: She is alert and oriented to person, place, and time.   Psychiatric: Her behavior is normal.       Significant Labs:  CBC:   Recent Labs  Lab 11/13/17  0502 11/14/17  0554   WBC 12.25 13.52*   HGB 10.3* 11.4*   HCT 28.2* 31.0*   PLT 52* 78*     CMP:   Recent  Labs  Lab 11/13/17  0502  11/14/17  0845   GLU 95  < > 105   CALCIUM 8.1*  < > 8.9   ALBUMIN 2.2*  --   --    PROT 5.6*  --   --    *  < > 121*   K 4.2  < > 3.7   CO2 16*  < > 19*   CL 96  < > 93*   BUN 9  < > 9   CREATININE 0.6  < > 0.6   ALKPHOS 219*  --   --    *  --   --    *  --   --    BILITOT 11.5*  --   --    < > = values in this interval not displayed.  Coagulation:   Recent Labs  Lab 11/14/17  0554   INR 1.9*         Significant Imaging:  Imaging results within the past 24 hours have been reviewed.

## 2017-11-14 NOTE — PROGRESS NOTES
[facility] Hospital Medicine  Progress Note    Patient Name: Marcie Bazan  MRN: [id number]  Patient Class: IP- Inpatient   Admission Date: 11/10/2017  Length of Stay: 4 days  Attending Physician: Ti Nuñez MD  Primary Care Provider: ODILIA Wall        Subjective:     Principal Problem:Hyponatremia    HPI:  Ms. Bazan is a 51 yo female  with a PMHx of alcoholic cirrhosis with ESLD (MELD score of 29) on liver transplant list, who was seen by Dr. Souza in clinic on 11/8 for nausea and fatigue.  She underwent therapeutic paracentesis 11/9, draining 2L.  At that time, labs were drawn and resulted a critical Na of 121.  Subsequently, she was instructed to come to ED for repeat labs and further evaluation.  Initial work-up in ED resulted Na 119, , , ammonia 33, INR 1.7, H&H 11.8/31.8, plt 109; VSS.  [facility] Medicine was consulted for admission.  Currently, patient appears comfortable, in NAD.  Denies any nausea currently, but reports fatigue persists.  Denies any lightheadedness/dizziness, syncope, AMS, visual disturbances, focal deficits, numbness/tingling, HA, seizure-like activity, chest pain, palpitations, SOB, cough, orthopnea, PND, edema, weight gain, ABD pain, N/V/D, dysuria, hematuria, fever, or chills.  Case discussed with Nephrology, who recommend 0.9% NS @ 75 mL/hr with BMP Q 8 hours.    Hospital Course:  Pt is a 53 y/o female with alcoholic liver disease who was admitted for hyponatremia. Sodium increasing very slowly. Hold diuretics. Restrict fluid intake to 1.5L daily. Gentle hydration. Lengthy discussion with renal, pt unlikely to return to normal Na, until liver transplant.  A goal of over 120 may be all that is realistic.  Platelets down to 57 abnormal coags, lovenox held. Hyponatremia likely due to overdiuresis/dehydration. Nephrology consult. Urinary sodium and osmality elevated.  will need to be held and f/u with Gi.  Pt not feeling girth has  increased or a need for paracenteses. Elevated AFP will be further addressed as outpatient. IVF's stopped per Nephrology. Na level improving slowly, now 121.     Interval History: No acute events overnight. Patient reports feeling better today. She denies nausea, vomiting or abdominal pain.     Review of Systems   Constitutional: Positive for fatigue. Negative for activity change, chills, diaphoresis, fever and unexpected weight change.   HENT: Negative for congestion, nosebleeds, postnasal drip, rhinorrhea, sinus pressure, sore throat and trouble swallowing.    Eyes: Negative for photophobia and visual disturbance.   Respiratory: Negative for apnea, cough, chest tightness, shortness of breath and wheezing.    Cardiovascular: Negative for chest pain, palpitations and leg swelling.   Gastrointestinal: Positive for nausea. Negative for abdominal distention, abdominal pain, blood in stool, constipation, diarrhea and vomiting.   Endocrine: Negative for polydipsia, polyphagia and polyuria.   Genitourinary: Negative for difficulty urinating, dysuria, frequency, hematuria and urgency.   Musculoskeletal: Negative for arthralgias, back pain, gait problem, joint swelling and myalgias.   Skin: Negative for pallor, rash and wound.   Neurological: Negative for dizziness, tremors, seizures, syncope, facial asymmetry, speech difficulty, weakness, light-headedness, numbness and headaches.   Psychiatric/Behavioral: Negative for agitation, confusion, hallucinations and sleep disturbance. The patient is not nervous/anxious.    All other systems reviewed and are negative.    Objective:     Vital Signs (Most Recent):  Temp: 97.9 °F (36.6 °C) (11/14/17 0744)  Pulse: 85 (11/14/17 0744)  Resp: 17 (11/14/17 0744)  BP: (!) 106/56 (11/14/17 0744)  SpO2: 100 % (11/14/17 0744) Vital Signs (24h Range):  Temp:  [97.8 °F (36.6 °C)-98.5 °F (36.9 °C)] 97.9 °F (36.6 °C)  Pulse:  [80-92] 85  Resp:  [16-20] 17  SpO2:  [96 %-100 %] 100 %  BP:  (106-132)/(56-86) 106/56     Weight: 72 kg (158 lb 11.7 oz)  Body mass index is 26.41 kg/m².    Intake/Output Summary (Last 24 hours) at 11/14/17 1044  Last data filed at 11/13/17 2202   Gross per 24 hour   Intake                0 ml   Output              500 ml   Net             -500 ml      Physical Exam   Constitutional: She is oriented to person, place, and time. She appears well-developed. No distress.   HENT:   Head: Normocephalic and atraumatic.   Mouth/Throat: Oropharynx is clear and moist. No oropharyngeal exudate.   Eyes: EOM are normal. Pupils are equal, round, and reactive to light.   Icteric sclera   Neck: Normal range of motion. Neck supple. No JVD present. Carotid bruit is not present. No tracheal deviation present. No thyroid mass and no thyromegaly present.   Cardiovascular: Normal rate, regular rhythm, normal heart sounds and intact distal pulses.  Exam reveals no gallop and no friction rub.    No murmur heard.  Pulmonary/Chest: Effort normal and breath sounds normal. No respiratory distress. She has no wheezes. She has no rales. She exhibits no tenderness.   Abdominal: Soft. Bowel sounds are normal. She exhibits distension. She exhibits no abdominal bruit, no ascites and no mass. There is no hepatosplenomegaly. There is no tenderness. There is no rebound, no guarding and no CVA tenderness.   Musculoskeletal: Normal range of motion. She exhibits no edema or tenderness.   Lymphadenopathy:     She has no cervical adenopathy.   Neurological: She is alert and oriented to person, place, and time. She has normal reflexes. She displays normal reflexes. No cranial nerve deficit. She exhibits normal muscle tone. Coordination normal.   Skin: Skin is warm and intact. No rash noted. No erythema. No pallor.       Significant Labs:   BMP:   Recent Labs  Lab 11/14/17  0845      *   K 3.7   CL 93*   CO2 19*   BUN 9   CREATININE 0.6   CALCIUM 8.9     CBC:   Recent Labs  Lab 11/13/17  0502  11/14/17  0554   WBC 12.25 13.52*   HGB 10.3* 11.4*   HCT 28.2* 31.0*   PLT 52* 78*     CMP:   Recent Labs  Lab 11/13/17  0502 11/13/17  1559 11/13/17  2355 11/14/17  0845   * 120* 121* 121*   K 4.2 4.4 4.2 3.7   CL 96 95 96 93*   CO2 16* 18* 18* 19*   GLU 95 131* 118* 105   BUN 9 8 8 9   CREATININE 0.6 0.6 0.6 0.6   CALCIUM 8.1* 8.4* 8.5* 8.9   PROT 5.6*  --   --   --    ALBUMIN 2.2*  --   --   --    BILITOT 11.5*  --   --   --    ALKPHOS 219*  --   --   --    *  --   --   --    *  --   --   --    ANIONGAP 8 7* 7* 9   EGFRNONAA >60 >60 >60 >60     All pertinent labs within the past 24 hours have been reviewed.    Significant Imaging:  Imaging Results    None       Assessment/Plan:      * Hyponatremia    - Will admit to Inpatient.  - Currently, asymptomatic.  - Case discussed with Nephrology.  - BMP Q8 hours.  11/11 2/2 end state liver.  Nephrology on board.  Pt understands Tx will take time as slow changes to sodium are needed.    11/12 - 119 today, increase infusion rate of NS  11/13-Sodium 120, IVF's stopped per Nephrology   -Na level improving slowly   -Will continue current plan         Alcoholic hepatitis with end-stage liver disease    - MELD score of 29.    - Continue home lactulose.  - LFT's increased from baseline at 232/199.  - Ammonia WNL at 33.  - Consult Dr. Souza/GI in AM.  - Avoid hepatotoxic agents.  -GI on board, lactulose ordered, last paracentesis 11/9/17, no AMS            Coagulopathy    - Secondary to ESLD.  - No evidence of active bleeding.  - INR/platelets stable.  -11/12 - platelets and coags abnormal, hold off on VTE prophylaxis and follow   -Platelets 78 today         Ascites    Continue to monitor. Once Na improves will consider restarting diuretics at a lower dose, continue to hold diuretics.         Essential hypertension    - BP well controlled currently without antihypertensives.  - Will hold home BP meds to prevent hypotension. Monitor BP trends, and resume as  needed.          VTE Risk Mitigation         Ordered     Medium Risk of VTE  Once      11/10/17 2347     Place sequential compression device  Until discontinued      11/10/17 2347     Reason for No Pharmacological VTE Prophylaxis  Once      11/10/17 2346              Vira Amador NP  Department of Hospital Medicine   [facility]

## 2017-11-14 NOTE — SUBJECTIVE & OBJECTIVE
Interval History: No acute events overnight. Patient reports feeling better today. She denies nausea, vomiting or abdominal pain.     Review of Systems   Constitutional: Positive for fatigue. Negative for activity change, chills, diaphoresis, fever and unexpected weight change.   HENT: Negative for congestion, nosebleeds, postnasal drip, rhinorrhea, sinus pressure, sore throat and trouble swallowing.    Eyes: Negative for photophobia and visual disturbance.   Respiratory: Negative for apnea, cough, chest tightness, shortness of breath and wheezing.    Cardiovascular: Negative for chest pain, palpitations and leg swelling.   Gastrointestinal: Positive for nausea. Negative for abdominal distention, abdominal pain, blood in stool, constipation, diarrhea and vomiting.   Endocrine: Negative for polydipsia, polyphagia and polyuria.   Genitourinary: Negative for difficulty urinating, dysuria, frequency, hematuria and urgency.   Musculoskeletal: Negative for arthralgias, back pain, gait problem, joint swelling and myalgias.   Skin: Negative for pallor, rash and wound.   Neurological: Negative for dizziness, tremors, seizures, syncope, facial asymmetry, speech difficulty, weakness, light-headedness, numbness and headaches.   Psychiatric/Behavioral: Negative for agitation, confusion, hallucinations and sleep disturbance. The patient is not nervous/anxious.    All other systems reviewed and are negative.    Objective:     Vital Signs (Most Recent):  Temp: 97.9 °F (36.6 °C) (11/14/17 0744)  Pulse: 85 (11/14/17 0744)  Resp: 17 (11/14/17 0744)  BP: (!) 106/56 (11/14/17 0744)  SpO2: 100 % (11/14/17 0744) Vital Signs (24h Range):  Temp:  [97.8 °F (36.6 °C)-98.5 °F (36.9 °C)] 97.9 °F (36.6 °C)  Pulse:  [80-92] 85  Resp:  [16-20] 17  SpO2:  [96 %-100 %] 100 %  BP: (106-132)/(56-86) 106/56     Weight: 72 kg (158 lb 11.7 oz)  Body mass index is 26.41 kg/m².    Intake/Output Summary (Last 24 hours) at 11/14/17 1044  Last data filed at  11/13/17 2202   Gross per 24 hour   Intake                0 ml   Output              500 ml   Net             -500 ml      Physical Exam   Constitutional: She is oriented to person, place, and time. She appears well-developed. No distress.   HENT:   Head: Normocephalic and atraumatic.   Mouth/Throat: Oropharynx is clear and moist. No oropharyngeal exudate.   Eyes: EOM are normal. Pupils are equal, round, and reactive to light.   Icteric sclera   Neck: Normal range of motion. Neck supple. No JVD present. Carotid bruit is not present. No tracheal deviation present. No thyroid mass and no thyromegaly present.   Cardiovascular: Normal rate, regular rhythm, normal heart sounds and intact distal pulses.  Exam reveals no gallop and no friction rub.    No murmur heard.  Pulmonary/Chest: Effort normal and breath sounds normal. No respiratory distress. She has no wheezes. She has no rales. She exhibits no tenderness.   Abdominal: Soft. Bowel sounds are normal. She exhibits distension. She exhibits no abdominal bruit, no ascites and no mass. There is no hepatosplenomegaly. There is no tenderness. There is no rebound, no guarding and no CVA tenderness.   Musculoskeletal: Normal range of motion. She exhibits no edema or tenderness.   Lymphadenopathy:     She has no cervical adenopathy.   Neurological: She is alert and oriented to person, place, and time. She has normal reflexes. She displays normal reflexes. No cranial nerve deficit. She exhibits normal muscle tone. Coordination normal.   Skin: Skin is warm and intact. No rash noted. No erythema. No pallor.       Significant Labs:   BMP:   Recent Labs  Lab 11/14/17  0845      *   K 3.7   CL 93*   CO2 19*   BUN 9   CREATININE 0.6   CALCIUM 8.9     CBC:   Recent Labs  Lab 11/13/17  0502 11/14/17  0554   WBC 12.25 13.52*   HGB 10.3* 11.4*   HCT 28.2* 31.0*   PLT 52* 78*     CMP:   Recent Labs  Lab 11/13/17  0502 11/13/17  1559 11/13/17  2355 11/14/17  0845   *  120* 121* 121*   K 4.2 4.4 4.2 3.7   CL 96 95 96 93*   CO2 16* 18* 18* 19*   GLU 95 131* 118* 105   BUN 9 8 8 9   CREATININE 0.6 0.6 0.6 0.6   CALCIUM 8.1* 8.4* 8.5* 8.9   PROT 5.6*  --   --   --    ALBUMIN 2.2*  --   --   --    BILITOT 11.5*  --   --   --    ALKPHOS 219*  --   --   --    *  --   --   --    *  --   --   --    ANIONGAP 8 7* 7* 9   EGFRNONAA >60 >60 >60 >60     All pertinent labs within the past 24 hours have been reviewed.    Significant Imaging:  Imaging Results    None

## 2017-11-14 NOTE — PROGRESS NOTES
[facility] -   Gastroenterology  Progress Note    Patient Name: Marcie Bazan  MRN: [id number]  Admission Date: 11/10/2017  Hospital Length of Stay: 4 days  Code Status: Full Code   Attending Provider: Ti Nuñez MD  Consulting Provider: Nabil Acosta PA-C  Primary Care Physician: ODILIA Wall  Principal Problem: Hyponatremia      Subjective:     Interval History:   The patient reports poor appetite. She says she isn't eating much and it is mostly soft foods. She reports some dysphagia today. Mostly with pills. She said is feels like it won't go down. She points to her neck. She denies nausea, vomiting or abdominal pain. The patient has had a BM. Sodium slowly tending up, but still only 121 today.     Review of Systems   Constitutional:        See Interval History for daily ROS.      Objective:     Vital Signs (Most Recent):  Temp: 98.3 °F (36.8 °C) (11/14/17 1205)  Pulse: 89 (11/14/17 1205)  Resp: 18 (11/14/17 1205)  BP: 126/60 (11/14/17 1205)  SpO2: 100 % (11/14/17 1205) Vital Signs (24h Range):  Temp:  [97.8 °F (36.6 °C)-98.5 °F (36.9 °C)] 98.3 °F (36.8 °C)  Pulse:  [80-89] 89  Resp:  [16-18] 18  SpO2:  [96 %-100 %] 100 %  BP: (106-132)/(56-86) 126/60     Weight: 72 kg (158 lb 11.7 oz) (11/14/17 0411)  Body mass index is 26.41 kg/m².      Intake/Output Summary (Last 24 hours) at 11/14/17 1441  Last data filed at 11/13/17 2202   Gross per 24 hour   Intake                0 ml   Output              500 ml   Net             -500 ml       Lines/Drains/Airways     Peripheral Intravenous Line                 Peripheral IV - Single Lumen 11/12/17 0110 Right Forearm 2 days                Physical Exam   Constitutional: She is oriented to person, place, and time. She appears well-developed. No distress.   HENT:   Head: Normocephalic and atraumatic.   Cardiovascular: Normal rate and regular rhythm.    Pulmonary/Chest: Effort normal and breath sounds normal. No respiratory distress.    Abdominal: Soft. Bowel sounds are normal. She exhibits no distension. There is no tenderness.   Neurological: She is alert and oriented to person, place, and time.   Psychiatric: Her behavior is normal.       Significant Labs:  CBC:   Recent Labs  Lab 11/13/17  0502 11/14/17  0554   WBC 12.25 13.52*   HGB 10.3* 11.4*   HCT 28.2* 31.0*   PLT 52* 78*     CMP:   Recent Labs  Lab 11/13/17  0502  11/14/17  0845   GLU 95  < > 105   CALCIUM 8.1*  < > 8.9   ALBUMIN 2.2*  --   --    PROT 5.6*  --   --    *  < > 121*   K 4.2  < > 3.7   CO2 16*  < > 19*   CL 96  < > 93*   BUN 9  < > 9   CREATININE 0.6  < > 0.6   ALKPHOS 219*  --   --    *  --   --    *  --   --    BILITOT 11.5*  --   --    < > = values in this interval not displayed.  Coagulation:   Recent Labs  Lab 11/14/17  0554   INR 1.9*         Significant Imaging:  Imaging results within the past 24 hours have been reviewed.    Assessment/Plan:     * Hyponatremia    Likely from combination of diuretics, volume depletion (from diarrhea, diuretics, recent paracentesis). Hold diuretics. Restrict fluid intake to 1.5L daily. Nephrology also following. Continue current treatment.   - Na slowly trending up. Goal around 125  - Continue fluid restriction  - Continue to hold diuretics        Alcoholic hepatitis with end-stage liver disease    Continue prednisone. Liver function poor but stable. Repeat labs in AM.  Continue current care.     MELD-Na score: 30 at 11/14/2017  8:45 AM  MELD score: 23 at 11/14/2017  8:45 AM  Calculated from:  Serum Creatinine: 0.6 mg/dL (Rounded to 1) at 11/14/2017  8:45 AM  Serum Sodium: 121 mmol/L (Rounded to 125) at 11/14/2017  8:45 AM  Total Bilirubin: 11.5 mg/dL at 11/13/2017  5:02 AM  INR(ratio): 1.9 at 11/14/2017  5:54 AM  Age: 52 years          Ascites    Continue to monitor. Once Na improves will consider restarting diuretics at a lower dose, but may need to continue to hold these.            Thank you for your consult. I  will follow-up with patient. Please contact us if you have any additional questions.    Nabil Acosta PA-C  Gastroenterology  [facility]

## 2017-11-14 NOTE — PROGRESS NOTES
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 She stated she would rather have a shot     Current Facility-Administered Medications   Medication Frequency    cetirizine tablet 10 mg Daily    lactulose 20 gram/30 mL solution Soln 15 g TID    ondansetron disintegrating tablet 4 mg Q8H PRN    pantoprazole EC tablet 40 mg Daily    predniSONE tablet 40 mg Daily    ramelteon tablet 8 mg Nightly PRN       Objective:     Vital Signs (Most Recent):  Temp: 97.9 °F (36.6 °C) (11/14/17 0744)  Pulse: 85 (11/14/17 0744)  Resp: 17 (11/14/17 0744)  BP: (!) 106/56 (11/14/17 0744)  SpO2: 100 % (11/14/17 0744)  O2 Device (Oxygen Therapy): room air (11/14/17 0411) Vital Signs (24h Range):  Temp:  [97.8 °F (36.6 °C)-98.5 °F (36.9 °C)] 97.9 °F (36.6 °C)  Pulse:  [80-92] 85  Resp:  [16-20] 17  SpO2:  [96 %-100 %] 100 %  BP: (106-132)/(56-86) 106/56     Weight: 72 kg (158 lb 11.7 oz) (11/14/17 0411)  Body mass index is 26.41 kg/m².  Body surface area is 1.82 meters squared.    I/O last 3 completed shifts:  In: 1228.3 [I.V.:1228.3]  Out: 500 [Urine:500]    Physical Exam   Constitutional: She is oriented to person, place,  and time. She appears well-developed. No distress.   HENT:   Head: Normocephalic and atraumatic.   Mouth/Throat: Oropharynx is clear and moist. No oropharyngeal exudate.   Eyes: EOM are normal. Pupils are equal, round, and reactive to light.   Icteric sclera   Neck: Normal range of motion. Neck supple. No JVD present. Carotid bruit is not present. No tracheal deviation present. No thyroid mass and no thyromegaly present.   Cardiovascular: Normal rate, regular rhythm, normal heart sounds and intact distal pulses.  Exam reveals no gallop and no friction rub.    No murmur heard.  Pulmonary/Chest: Effort normal and breath sounds normal. No respiratory distress. She has no wheezes. She has no rales. She exhibits no tenderness.   Abdominal: Soft. Bowel sounds are normal. She exhibits distension. She exhibits no abdominal bruit, no ascites and no mass. There is no hepatosplenomegaly. There is no tenderness. There is no rebound, no guarding and no CVA tenderness.   Musculoskeletal: Normal range of motion. She exhibits no edema or tenderness.   Lymphadenopathy:     She has no cervical adenopathy.   Neurological: She is alert and oriented to person, place, and time. She has normal reflexes. She displays normal reflexes. No cranial nerve deficit. She exhibits normal muscle tone. Coordination normal.   Skin: Skin is warm and intact. No rash noted. No erythema. No pallor.       Significant Labs:  CBC:   Recent Labs  Lab 11/14/17  0554   WBC 13.52*   RBC 4.00   HGB 11.4*   HCT 31.0*   PLT 78*   MCV 78*   MCH 28.5   MCHC 36.8*     CMP:   Recent Labs  Lab 11/13/17  0502  11/14/17  0845   GLU 95  < > 105   CALCIUM 8.1*  < > 8.9   ALBUMIN 2.2*  --   --    PROT 5.6*  --   --    *  < > 121*   K 4.2  < > 3.7   CO2 16*  < > 19*   CL 96  < > 93*   BUN 9  < > 9   CREATININE 0.6  < > 0.6   ALKPHOS 219*  --   --    *  --   --    *  --   --    BILITOT 11.5*  --   --    < > = values in this interval not  displayed.  Coagulation:   Recent Labs  Lab 11/09/17  1357  11/14/17  0554   INR 1.7*  < > 1.9*   APTT 32.2*  --   --    < > = values in this interval not displayed.  All labs within the past 24 hours have been reviewed.       Lab Results   Component Value Date    CALCIUM 8.9 11/14/2017    PHOS 3.3 11/08/2017       Lab Results   Component Value Date    ALBUMIN 2.2 (L) 11/13/2017       Significant Imaging: reviewed     Assessment/Plan:     * Hyponatremia    1. Hyponatremia : due to ESLD , was about 126 a month ago ,  121  today , can be due to dehydration, can resume diuretics as out pt in few days, needs f/u in GI and Nephrology depts in few days ,     Also hyponatremia can be an indicator of poor prognosis in this woman , will follow labs ,     2. HTN : BP controlled    3. Stable Hb    4. Thrombocytopenia consistent with ESLD                  I will follow-up with patient. Please contact us if you have any additional questions.     Total time spent 40 minutes including time needed to review the records,  patient  evaluation, documentation, face-to-face discussion with the patient,  primary team , more than 50% of the time was spent on coordination of care and counseling.       Luis Enrique Schwab MD  Nephrology  [facility]

## 2017-11-14 NOTE — ASSESSMENT & PLAN NOTE
- MELD score of 29.    - Continue home lactulose.  - LFT's increased from baseline at 232/199.  - Ammonia WNL at 33.  - Consult Dr. Souza/GI in AM.  - Avoid hepatotoxic agents.  -GI on board, lactulose ordered, last paracentesis 11/9/17, no AMS

## 2017-11-14 NOTE — PLAN OF CARE
Problem: Patient Care Overview  Goal: Plan of Care Review  Outcome: Ongoing (interventions implemented as appropriate)  Pt remains free of injury, pt denies pain, no s/s of distress. 24 hour chart check completed. Will continue to monitor.

## 2017-11-14 NOTE — PT/OT/SLP EVAL
Physical Therapy  Evaluation    Marcie Bazan   MRN: [id number]   Admitting Diagnosis: Hyponatremia    PT Received On: 11/14/17  PT Start Time: 0830     PT Stop Time: 0855    PT Total Time (min): 25 min       Billable Minutes:  Evaluation 15 and Gait [id number]    Diagnosis: Hyponatremia      Past Medical History:   Diagnosis Date    Hypertension     Liver cirrhosis       Past Surgical History:   Procedure Laterality Date    BREAST SURGERY      CHOLECYSTECTOMY      HYSTERECTOMY         Referring physician: RENNY  Date referred to PT: 11/14/2017      General Precautions: Standard,    Orthopedic Precautions:     Braces:              Patient History:  Lives With: child(sae), adult  Living Arrangements: house  Home Layout: Able to live on 1st floor  Transportation Available: family or friend will provide  Living Environment Comment: PT LIVES AT HOME WITH DAUGHTER AND HAS NO STEPS TO ENTER HOME.  Equipment Currently Used at Home: rollator  DME owned (not currently used): none    Previous Level of Function:  Ambulation Skills: independent  Transfer Skills: independent  ADL Skills: independent    Subjective:  Communicated with NURSE FATIMAH AND EPIC CHART REVIEW prior to session.   PT AGREED TO EVAL AND TX   Chief Complaint: NONE  Patient goals: GO HOME     Pain/Comfort  Pain Rating 1: 0/10  Pain Rating Post-Intervention 1: 0/10      Objective:   Patient found with: telemetry, peripheral IV     Cognitive Exam:  Oriented to: Person, Place, Time and Situation    Follows Commands/attention: Follows multistep  commands  Communication: clear/fluent  Safety awareness/insight to disability: intact    Physical Exam:  Postural examination/scapula alignment: No postural abnormalities identified    Skin integrity: Visible skin intact  Edema: None noted     Sensation:   Intact    Lower Extremity Range of Motion:  Right Lower Extremity: WFL  Left Lower Extremity: WFL    Lower Extremity Strength:  Right Lower Extremity: WFL  Left Lower  Extremity: WFL      Functional Mobility:  Bed Mobility:  Rolling/Turning Right: Independent  Supine to Sit: Independent  Sit to Supine: Independent    Transfers:  Sit <> Stand Assistance: Independent  Sit <> Stand Assistive Device: No Assistive Device  Bed <> Chair Technique: Stand Pivot  Bed <> Chair Assistance: Independent  Bed <> Chair Assistive Device: No Assistive Device    Gait:   Gait Distance: PT GT TRAINED X COMMUNITY DISTANCES WITH ROLLATOR MOD I  Assistance 1: Modified Independent  Gait Assistive Device: Rollator    Therapeutic Activities and Exercises:  PT IND WITH GT AND WILL BE D/C TO MOBILITY PROGRAM FOR MAINTENANCE.     AM-PAC 6 CLICK MOBILITY  How much help from another person does this patient currently need?   1 = Unable, Total/Dependent Assistance  2 = A lot, Maximum/Moderate Assistance  3 = A little, Minimum/Contact Guard/Supervision  4 = None, Modified Moro/Independent    Turning over in bed (including adjusting bedclothes, sheets and blankets)?: 4  Sitting down on and standing up from a chair with arms (e.g., wheelchair, bedside commode, etc.): 4  Moving from lying on back to sitting on the side of the bed?: 4  Moving to and from a bed to a chair (including a wheelchair)?: 4  Need to walk in hospital room?: 4  Climbing 3-5 steps with a railing?: 1  Total Score: 21     AM-PAC Raw Score CMS G-Code Modifier Level of Impairment Assistance   6 % Total / Unable   7 - 9 CM 80 - 100% Maximal Assist   10 - 14 CL 60 - 80% Moderate Assist   15 - 19 CK 40 - 60% Moderate Assist   20 - 22 CJ 20 - 40% Minimal Assist   23 CI 1-20% SBA / CGA   24 CH 0% Independent/ Mod I     Patient left up in chair with call button in reach.    Assessment:   Marcie Bazan is a 52 y.o. female with a medical diagnosis of Hyponatremia and presents with NO GROSS FUNC MOBILITY DEFICITS. PT WILL BE D/C TO MOBILITY PROGRAM.    Discharge recommendations: Discharge Facility/Level Of Care Needs: home     Barriers to  discharge: Barriers to Discharge: None    Equipment recommendations:    PLAN:  \  D/C TO MOBILITY PROGRAM  Patient to be seen   to address the above listed problems via    Plan of Care expires:    Plan of Care reviewed with: patient    Functional Assessment Tool Used: JOAN HOPKINS PAC  Score:   Functional Limitation: Mobility: Walking and moving around  Mobility: Walking and Moving Around Current Status ():   Mobility: Walking and Moving Around Goal Status ():   Mobility: Walking and Moving Around Discharge Status ():      Lynsey Silveira, PT  11/14/2017

## 2017-11-14 NOTE — ASSESSMENT & PLAN NOTE
- Secondary to ESLD.  - No evidence of active bleeding.  - INR/platelets stable.  -11/12 - platelets and coags abnormal, hold off on VTE prophylaxis and follow   -Platelets 78 today

## 2017-11-15 ENCOUNTER — TELEPHONE (OUTPATIENT)
Dept: GASTROENTEROLOGY | Facility: CLINIC | Age: 52
End: 2017-11-15

## 2017-11-15 VITALS
RESPIRATION RATE: 18 BRPM | OXYGEN SATURATION: 100 % | TEMPERATURE: 97 F | SYSTOLIC BLOOD PRESSURE: 134 MMHG | HEART RATE: 93 BPM | WEIGHT: 157.19 LBS | BODY MASS INDEX: 26.19 KG/M2 | DIASTOLIC BLOOD PRESSURE: 69 MMHG | HEIGHT: 65 IN

## 2017-11-15 LAB
ALBUMIN SERPL BCP-MCNC: 2.3 G/DL
ALBUMIN SERPL BCP-MCNC: 2.3 G/DL
ALP SERPL-CCNC: 231 U/L
ALT SERPL W/O P-5'-P-CCNC: 537 U/L
ANION GAP SERPL CALC-SCNC: 7 MMOL/L
ANISOCYTOSIS BLD QL SMEAR: ABNORMAL
AST SERPL-CCNC: 452 U/L
BASOPHILS # BLD AUTO: 0.02 K/UL
BASOPHILS NFR BLD: 0.1 %
BILIRUB DIRECT SERPL-MCNC: 8.5 MG/DL
BILIRUB SERPL-MCNC: 14.9 MG/DL
BUN SERPL-MCNC: 8 MG/DL
BURR CELLS BLD QL SMEAR: ABNORMAL
CALCIUM SERPL-MCNC: 8.5 MG/DL
CHLORIDE SERPL-SCNC: 92 MMOL/L
CO2 SERPL-SCNC: 21 MMOL/L
CREAT SERPL-MCNC: 0.6 MG/DL
DIFFERENTIAL METHOD: ABNORMAL
EOSINOPHIL # BLD AUTO: 0.1 K/UL
EOSINOPHIL NFR BLD: 0.9 %
ERYTHROCYTE [DISTWIDTH] IN BLOOD BY AUTOMATED COUNT: 24.2 %
EST. GFR  (AFRICAN AMERICAN): >60 ML/MIN/1.73 M^2
EST. GFR  (NON AFRICAN AMERICAN): >60 ML/MIN/1.73 M^2
GLUCOSE SERPL-MCNC: 100 MG/DL
HCT VFR BLD AUTO: 29.6 %
HGB BLD-MCNC: 11 G/DL
HYPOCHROMIA BLD QL SMEAR: ABNORMAL
INR PPP: 1.9
INR PPP: 1.9
LYMPHOCYTES # BLD AUTO: 2.4 K/UL
LYMPHOCYTES NFR BLD: 17.3 %
MCH RBC QN AUTO: 28.6 PG
MCHC RBC AUTO-ENTMCNC: 37.2 G/DL
MCV RBC AUTO: 77 FL
MONOCYTES # BLD AUTO: 1.5 K/UL
MONOCYTES NFR BLD: 10.8 %
NEUTROPHILS # BLD AUTO: 10 K/UL
NEUTROPHILS NFR BLD: 71.3 %
PHOSPHATE SERPL-MCNC: 2.4 MG/DL
PLATELET # BLD AUTO: 63 K/UL
PLATELET BLD QL SMEAR: ABNORMAL
PMV BLD AUTO: ABNORMAL FL
POIKILOCYTOSIS BLD QL SMEAR: ABNORMAL
POLYCHROMASIA BLD QL SMEAR: ABNORMAL
POTASSIUM SERPL-SCNC: 4 MMOL/L
PROT SERPL-MCNC: 5.9 G/DL
PROTHROMBIN TIME: 19.4 SEC
PROTHROMBIN TIME: 19.5 SEC
RBC # BLD AUTO: 3.85 M/UL
SCHISTOCYTES BLD QL SMEAR: PRESENT
SODIUM SERPL-SCNC: 120 MMOL/L
TARGETS BLD QL SMEAR: ABNORMAL
WBC # BLD AUTO: 14.07 K/UL

## 2017-11-15 PROCEDURE — 85610 PROTHROMBIN TIME: CPT

## 2017-11-15 PROCEDURE — 25000003 PHARM REV CODE 250: Performed by: PHYSICIAN ASSISTANT

## 2017-11-15 PROCEDURE — 85610 PROTHROMBIN TIME: CPT | Mod: 91

## 2017-11-15 PROCEDURE — 80076 HEPATIC FUNCTION PANEL: CPT

## 2017-11-15 PROCEDURE — 86665 EPSTEIN-BARR CAPSID VCA: CPT

## 2017-11-15 PROCEDURE — 36415 COLL VENOUS BLD VENIPUNCTURE: CPT

## 2017-11-15 PROCEDURE — 63600175 PHARM REV CODE 636 W HCPCS: Performed by: NURSE PRACTITIONER

## 2017-11-15 PROCEDURE — 85025 COMPLETE CBC W/AUTO DIFF WBC: CPT

## 2017-11-15 PROCEDURE — 25000003 PHARM REV CODE 250: Performed by: NURSE PRACTITIONER

## 2017-11-15 PROCEDURE — 99232 SBSQ HOSP IP/OBS MODERATE 35: CPT | Mod: ,,, | Performed by: INTERNAL MEDICINE

## 2017-11-15 PROCEDURE — 86645 CMV ANTIBODY IGM: CPT

## 2017-11-15 PROCEDURE — 80069 RENAL FUNCTION PANEL: CPT

## 2017-11-15 RX ORDER — FUROSEMIDE 40 MG/1
40 TABLET ORAL DAILY PRN
Qty: 30 TABLET | Refills: 1 | Status: ON HOLD | OUTPATIENT
Start: 2017-11-15 | End: 2017-11-28 | Stop reason: HOSPADM

## 2017-11-15 RX ORDER — POTASSIUM CHLORIDE 20 MEQ/1
TABLET, EXTENDED RELEASE ORAL
Qty: 14 TABLET | Refills: 0 | Status: ON HOLD | OUTPATIENT
Start: 2017-11-15 | End: 2018-01-05 | Stop reason: ALTCHOICE

## 2017-11-15 RX ORDER — SPIRONOLACTONE 100 MG/1
TABLET, FILM COATED ORAL
Qty: 14 TABLET | Refills: 0 | Status: ON HOLD | OUTPATIENT
Start: 2017-11-15 | End: 2018-01-04 | Stop reason: ALTCHOICE

## 2017-11-15 RX ADMIN — PANTOPRAZOLE SODIUM 40 MG: 40 TABLET, DELAYED RELEASE ORAL at 09:11

## 2017-11-15 RX ADMIN — LACTULOSE 15 G: 10 SOLUTION ORAL at 06:11

## 2017-11-15 RX ADMIN — LACTULOSE 15 G: 10 SOLUTION ORAL at 01:11

## 2017-11-15 RX ADMIN — CETIRIZINE HYDROCHLORIDE 10 MG: 10 TABLET, FILM COATED ORAL at 09:11

## 2017-11-15 RX ADMIN — PREDNISONE 40 MG: 20 TABLET ORAL at 09:11

## 2017-11-15 NOTE — ASSESSMENT & PLAN NOTE
- Will admit to Inpatient.  - Currently, asymptomatic.  - Case discussed with Nephrology.  - BMP Q8 hours.  11/11 2/2 end stage liver.  Nephrology on board.  Pt understands Tx will take time as slow changes to sodium are needed.    11/12 - 119 today, increase infusion rate of NS  11/13-Sodium 120, IVF's stopped per Nephrology   -Na level improving slowly   -Will continue current plan

## 2017-11-15 NOTE — PROGRESS NOTES
GUIDO Jerry at [facility] will be able to hold bed until end of day.

## 2017-11-15 NOTE — PROGRESS NOTES
[facility]  Gastroenterology  Progress Note    Patient Name: Marcie Bazan  MRN: [id number]  Admission Date: 11/10/2017  Hospital Length of Stay: 5 days  Code Status: Full Code   Attending Provider: Ti Nuñez MD  Consulting Provider: Nabil Acosta PA-C  Primary Care Physician: ODILIA Wall  Principal Problem: Hyponatremia      Subjective:     Interval History:   No overnight events. No complaints today other than wanting to go home. LFTs up some from yesterday. Sodium without any significant improvement.     Review of Systems   Constitutional:        See Interval History for daily ROS.      Objective:     Vital Signs (Most Recent):  Temp: 98.7 °F (37.1 °C) (11/15/17 1310)  Pulse: 89 (11/15/17 1310)  Resp: 18 (11/15/17 1310)  BP: (!) 113/54 (11/15/17 1310)  SpO2: 100 % (11/15/17 1310) Vital Signs (24h Range):  Temp:  [97.7 °F (36.5 °C)-98.8 °F (37.1 °C)] 98.7 °F (37.1 °C)  Pulse:  [84-98] 89  Resp:  [18] 18  SpO2:  [99 %-100 %] 100 %  BP: (113-147)/(54-79) 113/54     Weight: 71.3 kg (157 lb 3 oz) (11/15/17 0408)  Body mass index is 26.16 kg/m².      Intake/Output Summary (Last 24 hours) at 11/15/17 1434  Last data filed at 11/15/17 1127   Gross per 24 hour   Intake              360 ml   Output              500 ml   Net             -140 ml       Lines/Drains/Airways     Peripheral Intravenous Line                 Peripheral IV - Single Lumen 11/12/17 0110 Right Forearm 3 days                Physical Exam   Constitutional: She is oriented to person, place, and time. She appears well-developed and well-nourished. No distress.   HENT:   Head: Normocephalic and atraumatic.   Cardiovascular: Normal rate and regular rhythm.    Pulmonary/Chest: Effort normal and breath sounds normal. No respiratory distress.   Abdominal: Soft. Bowel sounds are normal. She exhibits distension. There is no tenderness.   Neurological: She is alert and oriented to person, place, and time. No cranial nerve  deficit.   Psychiatric: Her behavior is normal.       Significant Labs:  CBC:   Recent Labs  Lab 11/14/17  0554 11/15/17  0447   WBC 13.52* 14.07*   HGB 11.4* 11.0*   HCT 31.0* 29.6*   PLT 78* 63*     CMP:   Recent Labs  Lab 11/15/17  0446      CALCIUM 8.5*   ALBUMIN 2.3*  2.3*   PROT 5.9*   *   K 4.0   CO2 21*   CL 92*   BUN 8   CREATININE 0.6   ALKPHOS 231*   *   *   BILITOT 14.9*     Coagulation:   Recent Labs  Lab 11/15/17  0446   INR 1.9*  1.9*         Significant Imaging:  Imaging results within the past 24 hours have been reviewed.    Assessment/Plan:     * Hyponatremia    No significant change.   Continue to hold diuretics.         Alcoholic hepatitis with end-stage liver disease    MELD up today. Will check ultrasound. Otherwise patient is stable for discharge.   Check EBV and CMV before discharged.   Continue prednisone for one more week.   Do not restart diuretics. This was discussed with the patient.   Follow up with Dr. Souza in one week as scheduled.   Case was discussed with HM and Nephrology.     MELD-Na score: 30 at 11/15/2017  4:46 AM  MELD score: 24 at 11/15/2017  4:46 AM  Calculated from:  Serum Creatinine: 0.6 mg/dL (Rounded to 1) at 11/15/2017  4:46 AM  Serum Sodium: 120 mmol/L (Rounded to 125) at 11/15/2017  4:46 AM  Total Bilirubin: 14.9 mg/dL at 11/15/2017  4:46 AM  INR(ratio): 1.9 at 11/15/2017  4:46 AM  Age: 52 years              Thank you for your consult. We will follow up with patient in clinic.     Nabil Acosta PA-C  Gastroenterology  [facility]

## 2017-11-15 NOTE — ASSESSMENT & PLAN NOTE
- Secondary to ESLD.  - No evidence of active bleeding.  - INR/platelets stable.  -11/12 - platelets and coags abnormal, hold off on VTE prophylaxis and follow   -Platelets 68 today

## 2017-11-15 NOTE — SUBJECTIVE & OBJECTIVE
Subjective:     Interval History:   No overnight events. No complaints today other than wanting to go home. LFTs up some from yesterday. Sodium without any significant improvement.     Review of Systems   Constitutional:        See Interval History for daily ROS.      Objective:     Vital Signs (Most Recent):  Temp: 98.7 °F (37.1 °C) (11/15/17 1310)  Pulse: 89 (11/15/17 1310)  Resp: 18 (11/15/17 1310)  BP: (!) 113/54 (11/15/17 1310)  SpO2: 100 % (11/15/17 1310) Vital Signs (24h Range):  Temp:  [97.7 °F (36.5 °C)-98.8 °F (37.1 °C)] 98.7 °F (37.1 °C)  Pulse:  [84-98] 89  Resp:  [18] 18  SpO2:  [99 %-100 %] 100 %  BP: (113-147)/(54-79) 113/54     Weight: 71.3 kg (157 lb 3 oz) (11/15/17 0408)  Body mass index is 26.16 kg/m².      Intake/Output Summary (Last 24 hours) at 11/15/17 1434  Last data filed at 11/15/17 1127   Gross per 24 hour   Intake              360 ml   Output              500 ml   Net             -140 ml       Lines/Drains/Airways     Peripheral Intravenous Line                 Peripheral IV - Single Lumen 11/12/17 0110 Right Forearm 3 days                Physical Exam   Constitutional: She is oriented to person, place, and time. She appears well-developed and well-nourished. No distress.   HENT:   Head: Normocephalic and atraumatic.   Cardiovascular: Normal rate and regular rhythm.    Pulmonary/Chest: Effort normal and breath sounds normal. No respiratory distress.   Abdominal: Soft. Bowel sounds are normal. She exhibits distension. There is no tenderness.   Neurological: She is alert and oriented to person, place, and time. No cranial nerve deficit.   Psychiatric: Her behavior is normal.       Significant Labs:  CBC:   Recent Labs  Lab 11/14/17  0554 11/15/17  0447   WBC 13.52* 14.07*   HGB 11.4* 11.0*   HCT 31.0* 29.6*   PLT 78* 63*     CMP:   Recent Labs  Lab 11/15/17  0446      CALCIUM 8.5*   ALBUMIN 2.3*  2.3*   PROT 5.9*   *   K 4.0   CO2 21*   CL 92*   BUN 8   CREATININE 0.6    ALKPHOS 231*   *   *   BILITOT 14.9*     Coagulation:   Recent Labs  Lab 11/15/17  0446   INR 1.9*  1.9*         Significant Imaging:  Imaging results within the past 24 hours have been reviewed.

## 2017-11-15 NOTE — ASSESSMENT & PLAN NOTE
1. Hyponatremia : due to ESLD , was about 126 a month ago ,  120  today ,  hyponatremia can be an indicator of poor prognosis in this woman , worsening LFTs noted , discussed with GI , hold diuretics for now , not sure if we can correct her sodium with worsening hepatic function,     2. HTN : BP controlled    3. Stable Hb    4. Thrombocytopenia consistent with ESLD

## 2017-11-15 NOTE — SUBJECTIVE & OBJECTIVE
Interval History: No acute events overnight. No change in status.     Review of Systems   Constitutional: Positive for fatigue. Negative for activity change, chills, diaphoresis, fever and unexpected weight change.   HENT: Negative for congestion, nosebleeds, postnasal drip, rhinorrhea, sinus pressure, sore throat and trouble swallowing.    Eyes: Negative for photophobia and visual disturbance.   Respiratory: Negative for apnea, cough, chest tightness, shortness of breath and wheezing.    Cardiovascular: Negative for chest pain, palpitations and leg swelling.   Gastrointestinal: Positive for nausea. Negative for abdominal distention, abdominal pain, blood in stool, constipation, diarrhea and vomiting.   Endocrine: Negative for polydipsia, polyphagia and polyuria.   Genitourinary: Negative for difficulty urinating, dysuria, frequency, hematuria and urgency.   Musculoskeletal: Negative for arthralgias, back pain, gait problem, joint swelling and myalgias.   Skin: Negative for pallor, rash and wound.   Neurological: Negative for dizziness, tremors, seizures, syncope, facial asymmetry, speech difficulty, weakness, light-headedness, numbness and headaches.   Psychiatric/Behavioral: Negative for agitation, confusion, hallucinations and sleep disturbance. The patient is not nervous/anxious.    All other systems reviewed and are negative.    Objective:     Vital Signs (Most Recent):  Temp: 97.9 °F (36.6 °C) (11/15/17 0408)  Pulse: 84 (11/15/17 0408)  Resp: 18 (11/15/17 0408)  BP: 133/66 (11/15/17 0408)  SpO2: 100 % (11/15/17 0408) Vital Signs (24h Range):  Temp:  [97.7 °F (36.5 °C)-98.8 °F (37.1 °C)] 97.9 °F (36.6 °C)  Pulse:  [84-98] 84  Resp:  [18] 18  SpO2:  [99 %-100 %] 100 %  BP: (126-147)/(60-79) 133/66     Weight: 71.3 kg (157 lb 3 oz)  Body mass index is 26.16 kg/m².    Intake/Output Summary (Last 24 hours) at 11/15/17 0842  Last data filed at 11/15/17 0408   Gross per 24 hour   Intake              480 ml   Output              1150 ml   Net             -670 ml      Physical Exam   Constitutional: She is oriented to person, place, and time. She appears well-developed. No distress.   HENT:   Head: Normocephalic and atraumatic.   Mouth/Throat: Oropharynx is clear and moist. No oropharyngeal exudate.   Eyes: EOM are normal. Pupils are equal, round, and reactive to light.   Icteric sclera   Neck: Normal range of motion. Neck supple. No JVD present. Carotid bruit is not present. No tracheal deviation present. No thyroid mass and no thyromegaly present.   Cardiovascular: Normal rate, regular rhythm, normal heart sounds and intact distal pulses.  Exam reveals no gallop and no friction rub.    No murmur heard.  Pulmonary/Chest: Effort normal and breath sounds normal. No respiratory distress. She has no wheezes. She has no rales. She exhibits no tenderness.   Abdominal: Soft. Bowel sounds are normal. She exhibits distension. She exhibits no abdominal bruit, no ascites and no mass. There is no hepatosplenomegaly. There is no tenderness. There is no rebound, no guarding and no CVA tenderness.   Musculoskeletal: Normal range of motion. She exhibits no edema or tenderness.   Lymphadenopathy:     She has no cervical adenopathy.   Neurological: She is alert and oriented to person, place, and time. She has normal reflexes. She displays normal reflexes. No cranial nerve deficit. She exhibits normal muscle tone. Coordination normal.   Skin: Skin is warm and intact. No rash noted. No erythema. No pallor.       Significant Labs:   BMP:   Recent Labs  Lab 11/15/17  0446      *   K 4.0   CL 92*   CO2 21*   BUN 8   CREATININE 0.6   CALCIUM 8.5*     CBC:   Recent Labs  Lab 11/14/17  0554 11/15/17  0447   WBC 13.52* 14.07*   HGB 11.4* 11.0*   HCT 31.0* 29.6*   PLT 78* 63*     CMP:   Recent Labs  Lab 11/13/17  2355 11/14/17  0845 11/15/17  0446   * 121* 120*   K 4.2 3.7 4.0   CL 96 93* 92*   CO2 18* 19* 21*   * 105 100   BUN 8 9  8   CREATININE 0.6 0.6 0.6   CALCIUM 8.5* 8.9 8.5*   PROT  --   --  5.9*   ALBUMIN  --   --  2.3*  2.3*   BILITOT  --   --  14.9*   ALKPHOS  --   --  231*   AST  --   --  452*   ALT  --   --  537*   ANIONGAP 7* 9 7*   EGFRNONAA >60 >60 >60     All pertinent labs within the past 24 hours have been reviewed.    Significant Imaging:  Imaging Results    None

## 2017-11-15 NOTE — SUBJECTIVE & OBJECTIVE
Interval History:  No acute events, denies complaints     Review of patient's allergies indicates:   Allergen Reactions    Ferrous sulfate Other (See Comments)     Patient states the pill makes her sick. She stated she would rather have a shot     Current Facility-Administered Medications   Medication Frequency    cetirizine tablet 10 mg Daily    lactulose 20 gram/30 mL solution Soln 15 g TID    ondansetron disintegrating tablet 4 mg Q8H PRN    pantoprazole EC tablet 40 mg Daily    predniSONE tablet 40 mg Daily    ramelteon tablet 8 mg Nightly PRN       Objective:     Vital Signs (Most Recent):  Temp: 98.7 °F (37.1 °C) (11/15/17 1310)  Pulse: 89 (11/15/17 1310)  Resp: 18 (11/15/17 1310)  BP: (!) 113/54 (11/15/17 1310)  SpO2: 100 % (11/15/17 1310)  O2 Device (Oxygen Therapy): room air (11/15/17 1310) Vital Signs (24h Range):  Temp:  [97.7 °F (36.5 °C)-98.8 °F (37.1 °C)] 98.7 °F (37.1 °C)  Pulse:  [84-98] 89  Resp:  [18] 18  SpO2:  [99 %-100 %] 100 %  BP: (113-147)/(54-79) 113/54     Weight: 71.3 kg (157 lb 3 oz) (11/15/17 0408)  Body mass index is 26.16 kg/m².  Body surface area is 1.81 meters squared.    I/O last 3 completed shifts:  In: 480 [P.O.:480]  Out: 1650 [Urine:1650]    Physical Exam   Constitutional: She is oriented to person, place, and time. She appears well-developed. No distress.   HENT:   Head: Normocephalic and atraumatic.   Mouth/Throat: Oropharynx is clear and moist. No oropharyngeal exudate.   Eyes: EOM are normal. Pupils are equal, round, and reactive to light.   Icteric sclera   Neck: Normal range of motion. Neck supple. No JVD present. Carotid bruit is not present. No tracheal deviation present. No thyroid mass and no thyromegaly present.   Cardiovascular: Normal rate, regular rhythm, normal heart sounds and intact distal pulses.  Exam reveals no gallop and no friction rub.    No murmur heard.  Pulmonary/Chest: Effort normal and breath sounds normal. No respiratory distress. She has no  wheezes. She has no rales. She exhibits no tenderness.   Abdominal: Soft. Bowel sounds are normal. She exhibits distension. She exhibits no abdominal bruit, no ascites and no mass. There is no hepatosplenomegaly. There is no tenderness. There is no rebound, no guarding and no CVA tenderness.   Musculoskeletal: Normal range of motion. She exhibits no edema or tenderness.   Lymphadenopathy:     She has no cervical adenopathy.   Neurological: She is alert and oriented to person, place, and time. She has normal reflexes. She displays normal reflexes. No cranial nerve deficit. She exhibits normal muscle tone. Coordination normal.   Skin: Skin is warm and intact. No rash noted. No erythema. No pallor.       Significant Labs:  CBC:   Recent Labs  Lab 11/15/17  0447   WBC 14.07*   RBC 3.85*   HGB 11.0*   HCT 29.6*   PLT 63*   MCV 77*   MCH 28.6   MCHC 37.2*     CMP:   Recent Labs  Lab 11/15/17  0446      CALCIUM 8.5*   ALBUMIN 2.3*  2.3*   PROT 5.9*   *   K 4.0   CO2 21*   CL 92*   BUN 8   CREATININE 0.6   ALKPHOS 231*   *   *   BILITOT 14.9*     Coagulation:   Recent Labs  Lab 11/09/17  1357  11/15/17  0446   INR 1.7*  < > 1.9*  1.9*   APTT 32.2*  --   --    < > = values in this interval not displayed.  LFTs:   Recent Labs  Lab 11/15/17  0446   *   *   ALKPHOS 231*   BILITOT 14.9*   PROT 5.9*   ALBUMIN 2.3*  2.3*     All labs within the past 24 hours have been reviewed.     Significant Imaging: reviewed

## 2017-11-15 NOTE — PROGRESS NOTES
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 She stated she would rather have a shot     Current Facility-Administered Medications   Medication Frequency    cetirizine tablet 10 mg Daily    lactulose 20 gram/30 mL solution Soln 15 g TID    ondansetron disintegrating tablet 4 mg Q8H PRN    pantoprazole EC tablet 40 mg Daily    predniSONE tablet 40 mg Daily    ramelteon tablet 8 mg Nightly PRN       Objective:     Vital Signs (Most Recent):  Temp: 98.7 °F (37.1 °C) (11/15/17 1310)  Pulse: 89 (11/15/17 1310)  Resp: 18 (11/15/17 1310)  BP: (!) 113/54 (11/15/17 1310)  SpO2: 100 % (11/15/17 1310)  O2 Device (Oxygen Therapy): room air (11/15/17 1310) Vital Signs (24h Range):  Temp:  [97.7 °F (36.5 °C)-98.8 °F (37.1 °C)] 98.7 °F (37.1 °C)  Pulse:  [84-98] 89  Resp:  [18] 18  SpO2:  [99 %-100 %] 100 %  BP: (113-147)/(54-79) 113/54     Weight: 71.3 kg (157 lb 3 oz) (11/15/17 0408)  Body mass index is 26.16 kg/m².  Body surface area is 1.81 meters squared.    I/O last 3 completed shifts:  In: 480 [P.O.:480]  Out: 1650 [Urine:1650]    Physical Exam   Constitutional: She is oriented to person, place, and time. She  appears well-developed. No distress.   HENT:   Head: Normocephalic and atraumatic.   Mouth/Throat: Oropharynx is clear and moist. No oropharyngeal exudate.   Eyes: EOM are normal. Pupils are equal, round, and reactive to light.   Icteric sclera   Neck: Normal range of motion. Neck supple. No JVD present. Carotid bruit is not present. No tracheal deviation present. No thyroid mass and no thyromegaly present.   Cardiovascular: Normal rate, regular rhythm, normal heart sounds and intact distal pulses.  Exam reveals no gallop and no friction rub.    No murmur heard.  Pulmonary/Chest: Effort normal and breath sounds normal. No respiratory distress. She has no wheezes. She has no rales. She exhibits no tenderness.   Abdominal: Soft. Bowel sounds are normal. She exhibits distension. She exhibits no abdominal bruit, no ascites and no mass. There is no hepatosplenomegaly. There is no tenderness. There is no rebound, no guarding and no CVA tenderness.   Musculoskeletal: Normal range of motion. She exhibits no edema or tenderness.   Lymphadenopathy:     She has no cervical adenopathy.   Neurological: She is alert and oriented to person, place, and time. She has normal reflexes. She displays normal reflexes. No cranial nerve deficit. She exhibits normal muscle tone. Coordination normal.   Skin: Skin is warm and intact. No rash noted. No erythema. No pallor.       Significant Labs:  CBC:   Recent Labs  Lab 11/15/17  0447   WBC 14.07*   RBC 3.85*   HGB 11.0*   HCT 29.6*   PLT 63*   MCV 77*   MCH 28.6   MCHC 37.2*     CMP:   Recent Labs  Lab 11/15/17  0446      CALCIUM 8.5*   ALBUMIN 2.3*  2.3*   PROT 5.9*   *   K 4.0   CO2 21*   CL 92*   BUN 8   CREATININE 0.6   ALKPHOS 231*   *   *   BILITOT 14.9*     Coagulation:   Recent Labs  Lab 11/09/17  1357  11/15/17  0446   INR 1.7*  < > 1.9*  1.9*   APTT 32.2*  --   --    < > = values in this interval not displayed.  LFTs:   Recent Labs  Lab 11/15/17  0446    *   *   ALKPHOS 231*   BILITOT 14.9*   PROT 5.9*   ALBUMIN 2.3*  2.3*     All labs within the past 24 hours have been reviewed.     Significant Imaging: reviewed     Assessment/Plan:     * Hyponatremia    1. Hyponatremia : due to ESLD , was about 126 a month ago ,  120  today ,  hyponatremia can be an indicator of poor prognosis in this woman , worsening LFTs noted , discussed with GI , hold diuretics for now , not sure if we can correct her sodium with worsening hepatic function,     2. HTN : BP controlled    3. Stable Hb    4. Thrombocytopenia consistent with ESLD                 I will follow-up with patient. Please contact us if you have any additional questions.     Total time spent 30 minutes including time needed to review the records,  patient  evaluation, documentation, face-to-face discussion with the patient, primary team and GI service , more than 50% of the time was spent on coordination of care and counseling.       Luis Enrique Schwab MD  Nephrology  [facility]

## 2017-11-15 NOTE — ASSESSMENT & PLAN NOTE
MELD up today. Will check ultrasound. Otherwise patient is stable for discharge.   Check EBV and CMV before discharged.   Continue prednisone for one more week.   Do not restart diuretics. This was discussed with the patient.   Follow up with Dr. Souza in one week as scheduled.   Case was discussed with  and Nephrology.     MELD-Na score: 30 at 11/15/2017  4:46 AM  MELD score: 24 at 11/15/2017  4:46 AM  Calculated from:  Serum Creatinine: 0.6 mg/dL (Rounded to 1) at 11/15/2017  4:46 AM  Serum Sodium: 120 mmol/L (Rounded to 125) at 11/15/2017  4:46 AM  Total Bilirubin: 14.9 mg/dL at 11/15/2017  4:46 AM  INR(ratio): 1.9 at 11/15/2017  4:46 AM  Age: 52 years

## 2017-11-15 NOTE — TELEPHONE ENCOUNTER
----- Message from Tanya Martinez sent at 11/15/2017  2:06 PM CST -----  Contact: Patients daughter, Reginaldo Rod states her mother is still in the hospital, and has questions and information to give Dr Souza, please call her back at [phone]. Thank you

## 2017-11-15 NOTE — PLAN OF CARE
Problem: Patient Care Overview  Goal: Plan of Care Review  PT IS RAUL WITH ROLLATOR AND WILL BE D/C TO MOBILITY PROGRAM     Patient is SR on the monitor. Patient is oriented x3. All alarms are on and audible, will continue to monitor.

## 2017-11-15 NOTE — PROGRESS NOTES
[facility] Hospital Medicine  Progress Note    Patient Name: Marcie Bazan  MRN: [id number]  Patient Class: IP- Inpatient   Admission Date: 11/10/2017  Length of Stay: 5 days  Attending Physician: Ti Nuñez MD  Primary Care Provider: ODILIA Wall        Subjective:     Principal Problem:Hyponatremia    HPI:  Ms. Bazan is a 51 yo female  with a PMHx of alcoholic cirrhosis with ESLD (MELD score of 29) on liver transplant list, who was seen by Dr. Souza in clinic on 11/8 for nausea and fatigue.  She underwent therapeutic paracentesis 11/9, draining 2L.  At that time, labs were drawn and resulted a critical Na of 121.  Subsequently, she was instructed to come to ED for repeat labs and further evaluation.  Initial work-up in ED resulted Na 119, , , ammonia 33, INR 1.7, H&H 11.8/31.8, plt 109; VSS.  Hsopital Medicine was consulted for admission.  Currently, patient appears comfortable, in NAD.  Denies any nausea currently, but reports fatigue persists.  Denies any lightheadedness/dizziness, syncope, AMS, visual disturbances, focal deficits, numbness/tingling, HA, seizure-like activity, chest pain, palpitations, SOB, cough, orthopnea, PND, edema, weight gain, ABD pain, N/V/D, dysuria, hematuria, fever, or chills.  Case discussed with Nephrology, who recommend 0.9% NS @ 75 mL/hr with BMP Q 8 hours.    Hospital Course:  Pt is a 53 y/o female with alcoholic liver disease who was admitted for hyponatremia. Sodium increasing very slowly. Hold diuretics. Restrict fluid intake to 1.5L daily. Gentle hydration. Lengthy discussion with renal, pt unlikely to return to normal Na, until liver transplant.  A goal of over 120 may be all that is realistic.  Platelets down to 57 abnormal coags, lovenox held. Hyponatremia likely due to overdiuresis/dehydration. Nephrology consult. Urinary sodium and osmality elevated.  will need to be held and f/u with Gi.  Pt not feeling girth has  increased or a need for paracenteses. Elevated AFP will be further addressed as outpatient. IVF's stopped per Nephrology. Na essentially unchanged. Na 120. Na goal 125.     Interval History: No acute events overnight. No change in status.     Review of Systems   Constitutional: Positive for fatigue. Negative for activity change, chills, diaphoresis, fever and unexpected weight change.   HENT: Negative for congestion, nosebleeds, postnasal drip, rhinorrhea, sinus pressure, sore throat and trouble swallowing.    Eyes: Negative for photophobia and visual disturbance.   Respiratory: Negative for apnea, cough, chest tightness, shortness of breath and wheezing.    Cardiovascular: Negative for chest pain, palpitations and leg swelling.   Gastrointestinal: Positive for nausea. Negative for abdominal distention, abdominal pain, blood in stool, constipation, diarrhea and vomiting.   Endocrine: Negative for polydipsia, polyphagia and polyuria.   Genitourinary: Negative for difficulty urinating, dysuria, frequency, hematuria and urgency.   Musculoskeletal: Negative for arthralgias, back pain, gait problem, joint swelling and myalgias.   Skin: Negative for pallor, rash and wound.   Neurological: Negative for dizziness, tremors, seizures, syncope, facial asymmetry, speech difficulty, weakness, light-headedness, numbness and headaches.   Psychiatric/Behavioral: Negative for agitation, confusion, hallucinations and sleep disturbance. The patient is not nervous/anxious.    All other systems reviewed and are negative.    Objective:     Vital Signs (Most Recent):  Temp: 97.9 °F (36.6 °C) (11/15/17 0408)  Pulse: 84 (11/15/17 0408)  Resp: 18 (11/15/17 0408)  BP: 133/66 (11/15/17 0408)  SpO2: 100 % (11/15/17 0408) Vital Signs (24h Range):  Temp:  [97.7 °F (36.5 °C)-98.8 °F (37.1 °C)] 97.9 °F (36.6 °C)  Pulse:  [84-98] 84  Resp:  [18] 18  SpO2:  [99 %-100 %] 100 %  BP: (126-147)/(60-79) 133/66     Weight: 71.3 kg (157 lb 3 oz)  Body mass  index is 26.16 kg/m².    Intake/Output Summary (Last 24 hours) at 11/15/17 0842  Last data filed at 11/15/17 0408   Gross per 24 hour   Intake              480 ml   Output             1150 ml   Net             -670 ml      Physical Exam   Constitutional: She is oriented to person, place, and time. She appears well-developed. No distress.   HENT:   Head: Normocephalic and atraumatic.   Mouth/Throat: Oropharynx is clear and moist. No oropharyngeal exudate.   Eyes: EOM are normal. Pupils are equal, round, and reactive to light.   Icteric sclera   Neck: Normal range of motion. Neck supple. No JVD present. Carotid bruit is not present. No tracheal deviation present. No thyroid mass and no thyromegaly present.   Cardiovascular: Normal rate, regular rhythm, normal heart sounds and intact distal pulses.  Exam reveals no gallop and no friction rub.    No murmur heard.  Pulmonary/Chest: Effort normal and breath sounds normal. No respiratory distress. She has no wheezes. She has no rales. She exhibits no tenderness.   Abdominal: Soft. Bowel sounds are normal. She exhibits distension. She exhibits no abdominal bruit, no ascites and no mass. There is no hepatosplenomegaly. There is no tenderness. There is no rebound, no guarding and no CVA tenderness.   Musculoskeletal: Normal range of motion. She exhibits no edema or tenderness.   Lymphadenopathy:     She has no cervical adenopathy.   Neurological: She is alert and oriented to person, place, and time. She has normal reflexes. She displays normal reflexes. No cranial nerve deficit. She exhibits normal muscle tone. Coordination normal.   Skin: Skin is warm and intact. No rash noted. No erythema. No pallor.       Significant Labs:   BMP:   Recent Labs  Lab 11/15/17  0446      *   K 4.0   CL 92*   CO2 21*   BUN 8   CREATININE 0.6   CALCIUM 8.5*     CBC:   Recent Labs  Lab 11/14/17  0554 11/15/17  0447   WBC 13.52* 14.07*   HGB 11.4* 11.0*   HCT 31.0* 29.6*   PLT 78*  63*     CMP:   Recent Labs  Lab 11/13/17  2355 11/14/17  0845 11/15/17  0446   * 121* 120*   K 4.2 3.7 4.0   CL 96 93* 92*   CO2 18* 19* 21*   * 105 100   BUN 8 9 8   CREATININE 0.6 0.6 0.6   CALCIUM 8.5* 8.9 8.5*   PROT  --   --  5.9*   ALBUMIN  --   --  2.3*  2.3*   BILITOT  --   --  14.9*   ALKPHOS  --   --  231*   AST  --   --  452*   ALT  --   --  537*   ANIONGAP 7* 9 7*   EGFRNONAA >60 >60 >60     All pertinent labs within the past 24 hours have been reviewed.    Significant Imaging:  Imaging Results    None       Assessment/Plan:      * Hyponatremia    - Will admit to Inpatient.  - Currently, asymptomatic.  - Case discussed with Nephrology.  - BMP Q8 hours.  11/11 2/2 end stage liver.  Nephrology on board.  Pt understands Tx will take time as slow changes to sodium are needed.    11/12 - 119 today, increase infusion rate of NS  11/13-Sodium 120, IVF's stopped per Nephrology   -Na level improving slowly   -Will continue current plan         Alcoholic hepatitis with end-stage liver disease    - MELD score of 29.    - Continue home lactulose.  - LFT's increased from baseline at 232/199.  - Ammonia WNL at 33.  - Consult Dr. Souza/GI in AM.  - Avoid hepatotoxic agents.  -GI on board, lactulose ordered, last paracentesis 11/9/17, no AMS            Coagulopathy    - Secondary to ESLD.  - No evidence of active bleeding.  - INR/platelets stable.  -11/12 - platelets and coags abnormal, hold off on VTE prophylaxis and follow   -Platelets 68 today         Ascites    Continue to monitor. Once Na improves will consider restarting diuretics at a lower dose, continue to hold diuretics.         Essential hypertension    - BP well controlled currently without antihypertensives.  - Will hold home BP meds to prevent hypotension. Monitor BP trends, and resume as needed.          VTE Risk Mitigation         Ordered     Medium Risk of VTE  Once      11/10/17 2347     Place sequential compression device  Until  discontinued      11/10/17 2347     Reason for No Pharmacological VTE Prophylaxis  Once      11/10/17 2346              Vira Amador NP  Department of Hospital Medicine   [facility]

## 2017-11-15 NOTE — DISCHARGE SUMMARY
[facility] Hospital Medicine  Discharge Summary      Patient Name: Marcie Bazan  MRN: [id number]  Admission Date: 11/10/2017  Hospital Length of Stay: 5 days  Discharge Date and Time:  11/15/2017 5:00 PM  Attending Physician: Ti Nuñez MD   Discharging Provider: Vira Amador NP  Primary Care Provider: ODILIA Wall      HPI:   Ms. Bazan is a 53 yo female  with a PMHx of alcoholic cirrhosis with ESLD (MELD score of 29) on liver transplant list, who was seen by Dr. Souza in clinic on 11/8 for nausea and fatigue.  She underwent therapeutic paracentesis 11/9, draining 2L.  At that time, labs were drawn and resulted a critical Na of 121.  Subsequently, she was instructed to come to ED for repeat labs and further evaluation.  Initial work-up in ED resulted Na 119, , , ammonia 33, INR 1.7, H&H 11.8/31.8, plt 109; VSS.  Hsopital Medicine was consulted for admission.  Currently, patient appears comfortable, in NAD.  Denies any nausea currently, but reports fatigue persists.  Denies any lightheadedness/dizziness, syncope, AMS, visual disturbances, focal deficits, numbness/tingling, HA, seizure-like activity, chest pain, palpitations, SOB, cough, orthopnea, PND, edema, weight gain, ABD pain, N/V/D, dysuria, hematuria, fever, or chills.  Case discussed with Nephrology, who recommend 0.9% NS @ 75 mL/hr with BMP Q 8 hours.    * No surgery found *      Hospital Course:   Pt is a 51 y/o female with alcoholic liver disease who was admitted for hyponatremia. Sodium increasing very slowly. Hold diuretics. Restrict fluid intake to 1.5L daily. Gentle hydration. Lengthy discussion with renal, pt unlikely to return to normal Na, until liver transplant.  A goal of over 120 may be all that is realistic.  Platelets down to 57 abnormal coags, lovenox held. Hyponatremia likely due to overdiuresis/dehydration. Nephrology consult. Urinary sodium and osmality elevated.  will need to be held  and f/u with Gi.  Pt not feeling girth has increased or a need for paracenteses. Elevated AFP will be further addressed as outpatient. IVF's stopped per Nephrology. Na essentially unchanged. Na 120. Abdominal US showed Cirrhosis. Moderate perihepatic ascites. Normal liver Doppler. Negative for liver mass. Cholecystectomy. Case discussed with keo Fulton to discharge from GI standpoint. Patient needs to follow-up with Dr. Souza in 1 week as scheduled for hospital follow-up and lab studies. Continue to hold diuretics unless pt. Becomes SOB. Case also discussed with keo Fan to discharge from Nephrology standpoint. Patient seen and examined on the date of discharge and found to be suitable for discharge.      Consults:   Consults         Status Ordering Provider     Inpatient consult to Gastroenterology  Once     Provider:  Andrew Garcia MD    Completed SHIRA MILES     Inpatient consult to Nephrology  Once     Provider:  Luis Enrique Schwab MD    Completed OLIVA ALFONSO JR          * Hyponatremia    - Will admit to Inpatient.  - Currently, asymptomatic.  - Case discussed with Nephrology.  - BMP Q8 hours.  11/11 2/2 end stage liver.  Nephrology on board.  Pt understands Tx will take time as slow changes to sodium are needed.    11/12 - 119 today, increase infusion rate of NS  11/13-Sodium 120, IVF's stopped per Nephrology   -Na level improving slowly   -Will continue current plan         Alcoholic hepatitis with end-stage liver disease    - MELD score of 29.    - Continue home lactulose.  - LFT's increased from baseline at 232/199.  - Ammonia WNL at 33.  - Consult Dr. Souza/GI in AM.  - Avoid hepatotoxic agents.  -GI on board, lactulose ordered, last paracentesis 11/9/17, no AMS            Coagulopathy    - Secondary to ESLD.  - No evidence of active bleeding.  - INR/platelets stable.  -11/12 - platelets and coags abnormal, hold off on VTE prophylaxis and follow   -Platelets 68 today          Ascites    Continue to monitor. Once Na improves will consider restarting diuretics at a lower dose, continue to hold diuretics.         Essential hypertension    - BP well controlled currently without antihypertensives.  - Will hold home BP meds to prevent hypotension. Monitor BP trends, and resume as needed.          Final Active Diagnoses:    Diagnosis Date Noted POA    PRINCIPAL PROBLEM:  Hyponatremia [E87.1] 10/19/2017 Yes    Alcoholic hepatitis with end-stage liver disease [K70.11] 10/19/2017 Yes     Chronic    Coagulopathy [D68.9] 09/13/2017 Yes     Chronic    Essential hypertension [I10] 09/11/2017 Yes     Chronic    Ascites [R18.8] 09/11/2017 Yes      Problems Resolved During this Admission:    Diagnosis Date Noted Date Resolved POA       Discharged Condition: stable    Disposition: Home or Self Care    Follow Up:  Follow-up Information     ODILIA Wall In 3 days.    Specialty:  Family Medicine  Why:  HOSPITAL FOLLOW-UP IN 3-5 DAYS   Contact information:  [address] [facility] [address] [phone]             Joselin Souza MD In 1 week.    Specialties:  Transplant, Hepatology  Why:  FOLLOW-UP AS SCHEDULED, REPEAT BMP   Contact information:  [address]  [place] [id number] [phone]                 Patient Instructions:     Diet general     Activity as tolerated     Call MD for:  temperature >100.4     Call MD for:  persistent nausea and vomiting or diarrhea     Call MD for:  severe uncontrolled pain     Call MD for:  difficulty breathing or increased cough     Call MD for:  severe persistent headache     Call MD for:  persistent dizziness, light-headedness, or visual disturbances     Call MD for:  increased confusion or weakness         Significant Diagnostic Studies: Labs:   BMP:   Recent Labs  Lab 11/13/17  2355 11/14/17  0845 11/15/17  0446   * 105 100   * 121* 120*   K 4.2 3.7 4.0   CL 96 93* 92*   CO2 18* 19* 21*   BUN 8 9 8   CREATININE 0.6  0.6 0.6   CALCIUM 8.5* 8.9 8.5*   , CMP   Recent Labs  Lab 11/13/17  2355 11/14/17  0845 11/15/17  0446   * 121* 120*   K 4.2 3.7 4.0   CL 96 93* 92*   CO2 18* 19* 21*   * 105 100   BUN 8 9 8   CREATININE 0.6 0.6 0.6   CALCIUM 8.5* 8.9 8.5*   PROT  --   --  5.9*   ALBUMIN  --   --  2.3*  2.3*   BILITOT  --   --  14.9*   ALKPHOS  --   --  231*   AST  --   --  452*   ALT  --   --  537*   ANIONGAP 7* 9 7*   ESTGFRAFRICA >60 >60 >60   EGFRNONAA >60 >60 >60   , CBC   Recent Labs  Lab 11/14/17  0554 11/15/17  0447   WBC 13.52* 14.07*   HGB 11.4* 11.0*   HCT 31.0* 29.6*   PLT 78* 63*    and All labs within the past 24 hours have been reviewed    Pending Diagnostic Studies:     Procedure Component Value Units Date/Time    Cytomegalovirus (Cmv) Ab, Igm [[id number]] Collected:  11/15/17 1548    Order Status:  Sent Lab Status:  In process Updated:  11/15/17 1548    Specimen:  Blood     Ashvin-Barr Virus antibody panel [[id number]] Collected:  11/15/17 1548    Order Status:  Sent Lab Status:  In process Updated:  11/15/17 1548    Specimen:  Blood from Blood          Medications:  Reconciled Home Medications:   Current Discharge Medication List      CONTINUE these medications which have CHANGED    Details   furosemide (LASIX) 40 MG tablet Take 1 tablet (40 mg total) by mouth daily as needed.  Qty: 30 tablet, Refills: 1      potassium chloride SA (K-DUR,KLOR-CON) 20 MEQ tablet Taking only when taking lasix  Qty: 14 tablet, Refills: 0      spironolactone (ALDACTONE) 100 MG tablet Hold until follow-up with GI  Qty: 14 tablet, Refills: 0         CONTINUE these medications which have NOT CHANGED    Details   cetirizine (ZYRTEC) 10 MG tablet Take 10 mg by mouth once daily.      lisinopril (PRINIVIL,ZESTRIL) 5 MG tablet Take 1 tablet by mouth only as needed for high blood pressure      omeprazole (PRILOSEC) 40 MG capsule Take 40 mg by mouth once daily.      ondansetron (ZOFRAN-ODT) 4 MG TbDL Take 1 tablet (4 mg total) by  mouth every 8 (eight) hours as needed (nausea and vomiting).  Qty: 12 tablet, Refills: 0      predniSONE (DELTASONE) 20 MG tablet Take 2 tablets (40 mg total) by mouth once daily.  Qty: 60 tablet, Refills: 1      lactulose (CHRONULAC) 10 gram/15 mL solution TK 45 ML PO QID  Refills: 2             Indwelling Lines/Drains at time of discharge:   Lines/Drains/Airways          No matching active lines, drains, or airways          Time spent on the discharge of patient: >30 minutes  Patient was seen and examined on the date of discharge and determined to be suitable for discharge.         Vira Amador NP  Department of Hospital Medicine  [facility]

## 2017-11-16 ENCOUNTER — TELEPHONE (OUTPATIENT)
Dept: GASTROENTEROLOGY | Facility: CLINIC | Age: 52
End: 2017-11-16

## 2017-11-16 NOTE — TELEPHONE ENCOUNTER
----- Message from Eduarda Chavira sent at 11/16/2017  9:59 AM CST -----  Contact: Dr. Fuentes  Please call Dr. Fuentes at [phone].

## 2017-11-16 NOTE — TELEPHONE ENCOUNTER
----- Message from Ely Alex sent at 11/16/2017 11:58 AM CST -----  Contact: [place]/the [facility]   Call caller to verify what med pt is on.   [phone] press 9 for nurse station

## 2017-11-16 NOTE — NURSING
IV removed. Instructed to attend f.u appts, take Lasix only when SOB, Potassium only when taking Lasix, and hold spironolactone until appt c BLAIRE HIGGINS. Will call when daughter arrives.

## 2017-11-16 NOTE — TELEPHONE ENCOUNTER
I spoke with the physician at the inpatient rehabilitation.  He is concerned about the patient's significant medical issues.  Because of her liver failure it is very challenging for her to participate in inpatient program.  I explained that  she needs is a transplant and acknowledge that she is very sick, but needs some type of rehab to be a candidate.  She had a recent hospital stay and still remains severely decompensated with elevated meld score and a sodium of 120.  He wants to know if there are any other options for her to participate in rehabilitation in order to be considered a transplant candidate.  He suggested outpatient may be better although I am still concerned about her ability to go to outpt rehab because of her health.  I have discussed this with one of the coordinators who will assist with working with the social workers to see what other options we have available for her.  The physician at the facility is Dr. Boni Fuentes at [facility], his cell is [phone].

## 2017-11-18 LAB
CMV IGM TITR SERPL: 65.1 U/ML
EBV EA AB TITR SER: <5 U/ML
EBV NA IGG SER QL: >600 U/ML
EBV VCA IGG SER QL: >750 U/ML
EBV VCA IGM SER-ACNC: <10 U/ML

## 2017-11-20 ENCOUNTER — TELEPHONE (OUTPATIENT)
Dept: GASTROENTEROLOGY | Facility: CLINIC | Age: 52
End: 2017-11-20

## 2017-11-20 ENCOUNTER — HOSPITAL ENCOUNTER (INPATIENT)
Facility: HOSPITAL | Age: 52
LOS: 3 days | Discharge: ANOTHER HOSPITAL | DRG: 641 | End: 2017-11-23
Attending: EMERGENCY MEDICINE | Admitting: INTERNAL MEDICINE
Payer: MEDICAID

## 2017-11-20 DIAGNOSIS — R07.9 CHEST PAIN: ICD-10-CM

## 2017-11-20 DIAGNOSIS — D64.9 CHRONIC ANEMIA: ICD-10-CM

## 2017-11-20 DIAGNOSIS — E80.6 HYPERBILIRUBINEMIA: ICD-10-CM

## 2017-11-20 DIAGNOSIS — E87.1 CHRONIC HYPONATREMIA: Primary | ICD-10-CM

## 2017-11-20 DIAGNOSIS — K70.31 ALCOHOLIC CIRRHOSIS OF LIVER WITH ASCITES: ICD-10-CM

## 2017-11-20 LAB
ALBUMIN SERPL BCP-MCNC: 2.4 G/DL
ALP SERPL-CCNC: 319 U/L
ALT SERPL W/O P-5'-P-CCNC: 593 U/L
AMMONIA PLAS-SCNC: 47 UMOL/L
ANION GAP SERPL CALC-SCNC: 9 MMOL/L
APTT BLDCRRT: 35 SEC
AST SERPL-CCNC: 484 U/L
BASOPHILS # BLD AUTO: 0 K/UL
BASOPHILS NFR BLD: 0 %
BILIRUB SERPL-MCNC: 23.5 MG/DL
BILIRUB UR QL STRIP: ABNORMAL
BUN SERPL-MCNC: 10 MG/DL
CALCIUM SERPL-MCNC: 8.7 MG/DL
CHLORIDE SERPL-SCNC: 89 MMOL/L
CLARITY UR: ABNORMAL
CO2 SERPL-SCNC: 18 MMOL/L
COLOR UR: ABNORMAL
CREAT SERPL-MCNC: 0.5 MG/DL
DIFFERENTIAL METHOD: ABNORMAL
EOSINOPHIL # BLD AUTO: 0 K/UL
EOSINOPHIL NFR BLD: 0.2 %
ERYTHROCYTE [DISTWIDTH] IN BLOOD BY AUTOMATED COUNT: 24.5 %
EST. GFR  (AFRICAN AMERICAN): >60 ML/MIN/1.73 M^2
EST. GFR  (NON AFRICAN AMERICAN): >60 ML/MIN/1.73 M^2
GLUCOSE SERPL-MCNC: 124 MG/DL
GLUCOSE UR QL STRIP: ABNORMAL
HCT VFR BLD AUTO: 28.9 %
HGB BLD-MCNC: 10.7 G/DL
HGB UR QL STRIP: ABNORMAL
INR PPP: 2
KETONES UR QL STRIP: ABNORMAL
LEUKOCYTE ESTERASE UR QL STRIP: ABNORMAL
LIPASE SERPL-CCNC: 40 U/L
LYMPHOCYTES # BLD AUTO: 1.1 K/UL
LYMPHOCYTES NFR BLD: 8.8 %
MAGNESIUM SERPL-MCNC: 1.8 MG/DL
MCH RBC QN AUTO: 29.2 PG
MCHC RBC AUTO-ENTMCNC: 37 G/DL
MCV RBC AUTO: 79 FL
MICROSCOPIC COMMENT: NORMAL
MONOCYTES # BLD AUTO: 0.8 K/UL
MONOCYTES NFR BLD: 6.1 %
NEUTROPHILS # BLD AUTO: 10.7 K/UL
NEUTROPHILS NFR BLD: 85.3 %
NITRITE UR QL STRIP: ABNORMAL
PH UR STRIP: ABNORMAL [PH] (ref 5–8)
PHOSPHATE SERPL-MCNC: 2.4 MG/DL
PLATELET # BLD AUTO: 76 K/UL
PMV BLD AUTO: ABNORMAL FL
POTASSIUM SERPL-SCNC: 4.1 MMOL/L
PROT SERPL-MCNC: 6.7 G/DL
PROT UR QL STRIP: ABNORMAL
PROTHROMBIN TIME: 20.4 SEC
RBC # BLD AUTO: 3.67 M/UL
SODIUM SERPL-SCNC: 116 MMOL/L
SP GR UR STRIP: ABNORMAL (ref 1–1.03)
URN SPEC COLLECT METH UR: ABNORMAL
UROBILINOGEN UR STRIP-ACNC: ABNORMAL EU/DL
WBC # BLD AUTO: 12.62 K/UL
WBC #/AREA URNS HPF: 5 /HPF (ref 0–5)

## 2017-11-20 PROCEDURE — 81000 URINALYSIS NONAUTO W/SCOPE: CPT

## 2017-11-20 PROCEDURE — 84100 ASSAY OF PHOSPHORUS: CPT

## 2017-11-20 PROCEDURE — 83735 ASSAY OF MAGNESIUM: CPT

## 2017-11-20 PROCEDURE — 96360 HYDRATION IV INFUSION INIT: CPT

## 2017-11-20 PROCEDURE — 99285 EMERGENCY DEPT VISIT HI MDM: CPT | Mod: 25

## 2017-11-20 PROCEDURE — 80053 COMPREHEN METABOLIC PANEL: CPT

## 2017-11-20 PROCEDURE — 85730 THROMBOPLASTIN TIME PARTIAL: CPT

## 2017-11-20 PROCEDURE — 83690 ASSAY OF LIPASE: CPT

## 2017-11-20 PROCEDURE — 82140 ASSAY OF AMMONIA: CPT

## 2017-11-20 PROCEDURE — P9612 CATHETERIZE FOR URINE SPEC: HCPCS

## 2017-11-20 PROCEDURE — 85025 COMPLETE CBC W/AUTO DIFF WBC: CPT

## 2017-11-20 PROCEDURE — 93010 ELECTROCARDIOGRAM REPORT: CPT | Mod: ,,, | Performed by: INTERNAL MEDICINE

## 2017-11-20 PROCEDURE — 21400001 HC TELEMETRY ROOM

## 2017-11-20 PROCEDURE — 85610 PROTHROMBIN TIME: CPT

## 2017-11-20 PROCEDURE — 25000003 PHARM REV CODE 250: Performed by: NURSE PRACTITIONER

## 2017-11-20 RX ORDER — SODIUM CHLORIDE 9 MG/ML
INJECTION, SOLUTION INTRAVENOUS CONTINUOUS
Status: DISCONTINUED | OUTPATIENT
Start: 2017-11-20 | End: 2017-11-21

## 2017-11-20 RX ORDER — PANTOPRAZOLE SODIUM 40 MG/1
40 TABLET, DELAYED RELEASE ORAL DAILY
Status: DISCONTINUED | OUTPATIENT
Start: 2017-11-21 | End: 2017-11-23 | Stop reason: HOSPADM

## 2017-11-20 RX ORDER — PREDNISONE 20 MG/1
40 TABLET ORAL DAILY
Status: DISCONTINUED | OUTPATIENT
Start: 2017-11-21 | End: 2017-11-22

## 2017-11-20 RX ORDER — LACTULOSE 10 G/15ML
15 SOLUTION ORAL 3 TIMES DAILY
Status: DISCONTINUED | OUTPATIENT
Start: 2017-11-21 | End: 2017-11-21

## 2017-11-20 RX ORDER — RAMELTEON 8 MG/1
8 TABLET ORAL NIGHTLY PRN
Status: DISCONTINUED | OUTPATIENT
Start: 2017-11-20 | End: 2017-11-23 | Stop reason: HOSPADM

## 2017-11-20 RX ORDER — CETIRIZINE HYDROCHLORIDE 10 MG/1
10 TABLET ORAL DAILY
Status: DISCONTINUED | OUTPATIENT
Start: 2017-11-21 | End: 2017-11-23 | Stop reason: HOSPADM

## 2017-11-20 RX ORDER — ONDANSETRON 4 MG/1
4 TABLET, ORALLY DISINTEGRATING ORAL EVERY 8 HOURS PRN
Status: DISCONTINUED | OUTPATIENT
Start: 2017-11-20 | End: 2017-11-23 | Stop reason: HOSPADM

## 2017-11-20 RX ADMIN — SODIUM CHLORIDE: 0.9 INJECTION, SOLUTION INTRAVENOUS at 09:11

## 2017-11-20 NOTE — TELEPHONE ENCOUNTER
----- Message from Ely Alex sent at 11/20/2017  2:33 PM CST -----  Contact: valeria/ the [facility]   Call caller regarding getting pt a sooner appt cause she is not feeling good.   [phone] opt 9 for the nurse station

## 2017-11-20 NOTE — ED PROVIDER NOTES
"SCRIBE #1 NOTE: I, Lakia Silvia, am scribing for, and in the presence of, Jami Sánchez MD. I have scribed the entire note.      History      Chief Complaint   Patient presents with    Weakness     Daughter states, "SHe has liver disease and she says that her whole body hurts."       Review of patient's allergies indicates:   Allergen Reactions    Ferrous sulfate Other (See Comments)     Patient states the pill makes her sick. She stated she would rather have a shot        HPI   HPI    11/20/2017, 5:09 PM   History obtained from the patient and daughter      History of Present Illness: Marcie Bazan is a 52 y.o. female patient with a PMHx of Cirrhosis of the Liver who presents to the Emergency Department for generalized weakness which onset gradually PTA. Symptoms are constant and moderate in severity. No mitigating or exacerbating factors reported. Associated sxs include constipation, cough, decreased appetite, and abdominal distention. Patient/daughter denies any fever, chills, n/v/d, abd pain, sore throat, confusion, head trauma, dysuria, SOB, CP, BLE edema, and all other sxs at this time. Pt was instructed to stop taking her Lactulose last week. Pt is currently residing at [facility], an inpatient rehabilitation center. No further complaints or concerns at this time.       Arrival mode: Personal vehicle     PCP: ODILIA Wall       Past Medical History:  Past Medical History:   Diagnosis Date    Anemia of chronic disease     Coagulopathy     End stage liver disease     Hypertension     Hyponatremia     Liver cirrhosis     Liver transplant candidate        Past Surgical History:  Past Surgical History:   Procedure Laterality Date    BREAST SURGERY      CHOLECYSTECTOMY      HYSTERECTOMY           Family History:  Family History   Problem Relation Age of Onset    Hypertension Mother     Hypertension Father     Diabetes Father     Diabetes Sister     Depression Maternal " Grandfather        Social History:  Social History     Social History Main Topics    Smoking status: Never Smoker    Smokeless tobacco: Never Used    Alcohol use Yes      Comment: occasionally    Drug use: No    Sexual activity: Not Currently       ROS   Review of Systems   Constitutional: Positive for appetite change (decreased). Negative for chills and fever.        (+) generalized weakness   HENT: Negative for sore throat.    Respiratory: Positive for cough. Negative for shortness of breath.    Cardiovascular: Negative for chest pain and leg swelling.   Gastrointestinal: Positive for abdominal distention and constipation. Negative for abdominal pain, diarrhea, nausea and vomiting.   Genitourinary: Negative for dysuria.   Musculoskeletal: Negative for back pain.   Skin: Negative for rash.   Neurological: Negative for weakness.        (-) Head trauma   Hematological: Does not bruise/bleed easily.   Psychiatric/Behavioral: Negative for confusion.   All other systems reviewed and are negative.      Physical Exam      Initial Vitals [11/20/17 1702]   BP Pulse Resp Temp SpO2   109/67 94 20 97.7 °F (36.5 °C) 98 %      MAP       81          Physical Exam  Nursing Notes and Vital Signs Reviewed.  Constitutional: Patient is in no acute distress. Well-developed and well-nourished. Appears lethargic.  Head: Atraumatic. Normocephalic.  Eyes: PERRL. EOM intact. Conjunctivae are not pale. Scleral icterus.  ENT: Mucous membranes are moist. Oropharynx is clear and symmetric.    Neck: Supple. Full ROM. No lymphadenopathy.  Cardiovascular: Regular rate. Regular rhythm. No murmurs, rubs, or gallops. Distal pulses are 2+ and symmetric.  Pulmonary/Chest: No respiratory distress. Clear to auscultation bilaterally. No wheezing or rales.  Abdominal: Distended but soft.  There is no tenderness.  No rebound, guarding, or rigidity. Good bowel sounds.  Musculoskeletal: Moves all extremities. No obvious deformities. No BLE edema. No calf  "tenderness.  Skin: Warm and dry. Jaundice.   Neurological:  Alert, awake, and appropriate.  Normal speech.  No acute focal neurological deficits are appreciated.  Psychiatric: Normal affect. Good eye contact. Appropriate in content.    ED Course    Procedures  ED Vital Signs:  Vitals:    11/20/17 1702 11/20/17 1752 11/20/17 1755 11/20/17 1758   BP: 109/67 (!) 116/56 124/66 117/70   Pulse: 94 86 89 95   Resp: 20      Temp: 97.7 °F (36.5 °C)      TempSrc: Oral      SpO2: 98%      Weight: 71.2 kg (157 lb)      Height: 5' 5" (1.651 m)       11/20/17 1923 11/20/17 2023 11/20/17 2123 11/20/17 2223   BP: (!) 102/51 119/60 (!) 121/59 122/64   Pulse: 89 84 85 88   Resp: 13 17 16 16   Temp: 98.2 °F (36.8 °C)   98.1 °F (36.7 °C)   TempSrc: Oral   Oral   SpO2: 100% 99% 100% 99%   Weight:       Height:           Abnormal Lab Results:  Labs Reviewed   CBC W/ AUTO DIFFERENTIAL - Abnormal; Notable for the following:        Result Value    RBC 3.67 (*)     Hemoglobin 10.7 (*)     Hematocrit 28.9 (*)     MCV 79 (*)     MCHC 37.0 (*)     RDW 24.5 (*)     Platelets 76 (*)     Gran # 10.7 (*)     Gran% 85.3 (*)     Lymph% 8.8 (*)     All other components within normal limits   COMPREHENSIVE METABOLIC PANEL - Abnormal; Notable for the following:     Sodium 116 (*)     Chloride 89 (*)     CO2 18 (*)     Glucose 124 (*)     Albumin 2.4 (*)     Total Bilirubin 23.5 (*)     Alkaline Phosphatase 319 (*)      (*)      (*)     All other components within normal limits    Narrative:     Sodium critical result(s) called and verbal readback obtained from   Deonna Patrick RN., 11/20/2017 18:08   PHOSPHORUS - Abnormal; Notable for the following:     Phosphorus 2.4 (*)     All other components within normal limits   APTT - Abnormal; Notable for the following:     aPTT 35.0 (*)     All other components within normal limits   PROTIME-INR - Abnormal; Notable for the following:     Prothrombin Time 20.4 (*)     INR 2.0 (*)     All " other components within normal limits   URINALYSIS - Abnormal; Notable for the following:     Appearance, UA Hazy (*)     All other components within normal limits   MAGNESIUM   LIPASE   AMMONIA   URINALYSIS MICROSCOPIC   BASIC METABOLIC PANEL        All Lab Results:  Results for orders placed or performed during the hospital encounter of 11/20/17   CBC auto differential   Result Value Ref Range    WBC 12.62 3.90 - 12.70 K/uL    RBC 3.67 (L) 4.00 - 5.40 M/uL    Hemoglobin 10.7 (L) 12.0 - 16.0 g/dL    Hematocrit 28.9 (L) 37.0 - 48.5 %    MCV 79 (L) 82 - 98 fL    MCH 29.2 27.0 - 31.0 pg    MCHC 37.0 (H) 32.0 - 36.0 g/dL    RDW 24.5 (H) 11.5 - 14.5 %    Platelets 76 (L) 150 - 350 K/uL    MPV SEE COMMENT 9.2 - 12.9 fL    Gran # 10.7 (H) 1.8 - 7.7 K/uL    Lymph # 1.1 1.0 - 4.8 K/uL    Mono # 0.8 0.3 - 1.0 K/uL    Eos # 0.0 0.0 - 0.5 K/uL    Baso # 0.00 0.00 - 0.20 K/uL    Gran% 85.3 (H) 38.0 - 73.0 %    Lymph% 8.8 (L) 18.0 - 48.0 %    Mono% 6.1 4.0 - 15.0 %    Eosinophil% 0.2 0.0 - 8.0 %    Basophil% 0.0 0.0 - 1.9 %    Differential Method Automated    Comprehensive metabolic panel   Result Value Ref Range    Sodium 116 (LL) 136 - 145 mmol/L    Potassium 4.1 3.5 - 5.1 mmol/L    Chloride 89 (L) 95 - 110 mmol/L    CO2 18 (L) 23 - 29 mmol/L    Glucose 124 (H) 70 - 110 mg/dL    BUN, Bld 10 6 - 20 mg/dL    Creatinine 0.5 0.5 - 1.4 mg/dL    Calcium 8.7 8.7 - 10.5 mg/dL    Total Protein 6.7 6.0 - 8.4 g/dL    Albumin 2.4 (L) 3.5 - 5.2 g/dL    Total Bilirubin 23.5 (H) 0.1 - 1.0 mg/dL    Alkaline Phosphatase 319 (H) 55 - 135 U/L     (H) 10 - 40 U/L     (H) 10 - 44 U/L    Anion Gap 9 8 - 16 mmol/L    eGFR if African American >60 >60 mL/min/1.73 m^2    eGFR if non African American >60 >60 mL/min/1.73 m^2   Magnesium   Result Value Ref Range    Magnesium 1.8 1.6 - 2.6 mg/dL   Phosphorus   Result Value Ref Range    Phosphorus 2.4 (L) 2.7 - 4.5 mg/dL   Lipase   Result Value Ref Range    Lipase 40 4 - 60 U/L   APTT   Result  Value Ref Range    aPTT 35.0 (H) 21.0 - 32.0 sec   Protime-INR   Result Value Ref Range    Prothrombin Time 20.4 (H) 9.0 - 12.5 sec    INR 2.0 (H) 0.8 - 1.2   Urinalysis Catheterized   Result Value Ref Range    Specimen UA Urine, Catheterized     Color, UA Peace Yellow, Straw, Peace    Appearance, UA Hazy (A) Clear    pH, UA SEE COMMENT 5.0 - 8.0    Specific Gravity, UA SEE COMMENT 1.005 - 1.030    Protein, UA SEE COMMENT Negative    Glucose, UA SEE COMMENT Negative    Ketones, UA SEE COMMENT Negative    Bilirubin (UA) SEE COMMENT Negative    Occult Blood UA SEE COMMENT Negative    Nitrite, UA SEE COMMENT Negative    Urobilinogen, UA SEE COMMENT <2.0 EU/dL    Leukocytes, UA SEE COMMENT Negative   Ammonia   Result Value Ref Range    Ammonia 47 10 - 50 umol/L   Urinalysis Microscopic   Result Value Ref Range    WBC, UA 5 0 - 5 /hpf    Microscopic Comment SEE COMMENT        Imaging Results:  Imaging Results          X-Ray Chest AP Portable (Final result)  Result time 11/20/17 17:55:44    Final result by Crystal Deshpande MD (11/20/17 17:55:44)                 Impression:     No acute cardiopulmonary disease.            Electronically signed by: CRYSTAL DESHPANDE MD  Date:     11/20/17  Time:    17:55              Narrative:    EXAM:   [id number] CHEST AP PORTABLE    CLINICAL HISTORY:  Chest Pain    COMPARISON: 10/19/2017    Findings:     The lungs are clear. The cardiac silhouette is within normal limits.                             The EKG was ordered, reviewed, and independently interpreted by the ED provider.  Interpretation time: 17:24  Rate: 84 BPM  Rhythm: normal sinus rhythm  Interpretation: Possible L atrial enlargement. Septal infarct. No STEMI.         The Emergency Provider reviewed the vital signs and test results, which are outlined above.    ED Discussion     5:45 PM: Pt's calculated MELD score is 26.    6:31 PM: Discussed the pt's case with Dr. Madrid () who recommends admitting pt. He states  he will speak with Dr. Souza regarding pt's case and develop a plan.    6:43 PM: Discussed case with Yogi Bazan NP ([facility]). Dr. Allison agrees with current care and management of pt and accepts admission.   Admitting Service: Hospital medicine   Admitting Physician: Dr. Allison  Admit to: Telemetry    6:49 PM: Re-evaluated pt. I have discussed test results, shared treatment plan, and the need for admission with patient and family at bedside. Pt and family express understanding at this time and agree with all information. All questions answered. Pt and family have no further questions or concerns at this time. Pt is ready for admit.      ED Medication(s):  Medications   0.9%  NaCl infusion ( Intravenous New Bag 11/20/17 2112)       New Prescriptions    No medications on file             Medical Decision Making    Medical Decision Making:   Clinical Tests:   Lab Tests: Ordered and Reviewed  Radiological Study: Reviewed and Ordered  Medical Tests: Reviewed and Ordered           Scribe Attestation:   Scribe #1: I performed the above scribed service and the documentation accurately describes the services I performed. I attest to the accuracy of the note.    Attending:   Physician Attestation Statement for Scribe #1: I, Jami Sánchez MD, personally performed the services described in this documentation, as scribed by Lakia Vega, in my presence, and it is both accurate and complete.          Clinical Impression       ICD-10-CM ICD-9-CM   1. Chronic hyponatremia E87.1 276.1   2. Chest pain R07.9 786.50   3. Alcoholic cirrhosis of liver with ascites K70.31 571.2   4. Chronic anemia D64.9 285.9   5. Hyperbilirubinemia E80.6 782.4       Disposition:   Disposition: Admitted  Condition: Fair         Jami Sánchez MD  11/20/17 5729

## 2017-11-20 NOTE — TELEPHONE ENCOUNTER
Spoke with Trinidad ALBRIGHT with [facility]. She states she is sending patient to [facility] emergency room to be evaluated. Appointment is on Wednesday when Dr. Souza is in clinic,[facility].

## 2017-11-21 PROBLEM — K70.31 ALCOHOLIC CIRRHOSIS OF LIVER WITH ASCITES: Chronic | Status: ACTIVE | Noted: 2017-10-19

## 2017-11-21 PROBLEM — K70.11 ALCOHOLIC HEPATITIS WITH ASCITES: Status: ACTIVE | Noted: 2017-11-21

## 2017-11-21 PROBLEM — K70.10 ALCOHOLIC HEPATITIS: Status: ACTIVE | Noted: 2017-11-21

## 2017-11-21 PROBLEM — K72.10 END STAGE LIVER DISEASE: Status: ACTIVE | Noted: 2017-11-10

## 2017-11-21 PROBLEM — K70.11 ALCOHOLIC HEPATITIS WITH ASCITES: Chronic | Status: ACTIVE | Noted: 2017-11-21

## 2017-11-21 LAB
ALBUMIN SERPL BCP-MCNC: 2.1 G/DL
ALP SERPL-CCNC: 284 U/L
ALT SERPL W/O P-5'-P-CCNC: 491 U/L
ANION GAP SERPL CALC-SCNC: 7 MMOL/L
ANION GAP SERPL CALC-SCNC: 8 MMOL/L
ANISOCYTOSIS BLD QL SMEAR: SLIGHT
AST SERPL-CCNC: 376 U/L
BASOPHILS # BLD AUTO: 0 K/UL
BASOPHILS NFR BLD: 0 %
BILIRUB SERPL-MCNC: 20 MG/DL
BUN SERPL-MCNC: 10 MG/DL
BUN SERPL-MCNC: 10 MG/DL
BUN SERPL-MCNC: 9 MG/DL
BUN SERPL-MCNC: 9 MG/DL
CALCIUM SERPL-MCNC: 8.1 MG/DL
CALCIUM SERPL-MCNC: 8.2 MG/DL
CALCIUM SERPL-MCNC: 8.3 MG/DL
CALCIUM SERPL-MCNC: 8.3 MG/DL
CHLORIDE SERPL-SCNC: 89 MMOL/L
CHLORIDE SERPL-SCNC: 90 MMOL/L
CHLORIDE SERPL-SCNC: 91 MMOL/L
CHLORIDE SERPL-SCNC: 92 MMOL/L
CO2 SERPL-SCNC: 19 MMOL/L
CO2 SERPL-SCNC: 20 MMOL/L
CO2 SERPL-SCNC: 20 MMOL/L
CO2 SERPL-SCNC: 21 MMOL/L
CREAT SERPL-MCNC: 0.6 MG/DL
CREAT SERPL-MCNC: 0.7 MG/DL
DACRYOCYTES BLD QL SMEAR: ABNORMAL
DIFFERENTIAL METHOD: ABNORMAL
EOSINOPHIL # BLD AUTO: 0 K/UL
EOSINOPHIL NFR BLD: 0.3 %
ERYTHROCYTE [DISTWIDTH] IN BLOOD BY AUTOMATED COUNT: 24.5 %
EST. GFR  (AFRICAN AMERICAN): >60 ML/MIN/1.73 M^2
EST. GFR  (NON AFRICAN AMERICAN): >60 ML/MIN/1.73 M^2
GLUCOSE SERPL-MCNC: 107 MG/DL
GLUCOSE SERPL-MCNC: 112 MG/DL
GLUCOSE SERPL-MCNC: 140 MG/DL
GLUCOSE SERPL-MCNC: 141 MG/DL
HCT VFR BLD AUTO: 28.9 %
HGB BLD-MCNC: 10.6 G/DL
HYPOCHROMIA BLD QL SMEAR: ABNORMAL
INR PPP: 2
LYMPHOCYTES # BLD AUTO: 1.7 K/UL
LYMPHOCYTES NFR BLD: 15.2 %
MCH RBC QN AUTO: 29 PG
MCHC RBC AUTO-ENTMCNC: 36.7 G/DL
MCV RBC AUTO: 79 FL
MONOCYTES # BLD AUTO: 1 K/UL
MONOCYTES NFR BLD: 9 %
NEUTROPHILS # BLD AUTO: 8.3 K/UL
NEUTROPHILS NFR BLD: 75.9 %
OVALOCYTES BLD QL SMEAR: ABNORMAL
PLATELET # BLD AUTO: 59 K/UL
PMV BLD AUTO: ABNORMAL FL
POIKILOCYTOSIS BLD QL SMEAR: SLIGHT
POTASSIUM SERPL-SCNC: 3.4 MMOL/L
POTASSIUM SERPL-SCNC: 3.6 MMOL/L
PROT SERPL-MCNC: 5.6 G/DL
PROTHROMBIN TIME: 20.6 SEC
RBC # BLD AUTO: 3.66 M/UL
SCHISTOCYTES BLD QL SMEAR: PRESENT
SODIUM SERPL-SCNC: 116 MMOL/L
SODIUM SERPL-SCNC: 117 MMOL/L
SODIUM SERPL-SCNC: 119 MMOL/L
SODIUM SERPL-SCNC: 119 MMOL/L
TARGETS BLD QL SMEAR: ABNORMAL
WBC # BLD AUTO: 10.96 K/UL

## 2017-11-21 PROCEDURE — 63600175 PHARM REV CODE 636 W HCPCS: Performed by: NURSE PRACTITIONER

## 2017-11-21 PROCEDURE — 99232 SBSQ HOSP IP/OBS MODERATE 35: CPT | Mod: ,,, | Performed by: INTERNAL MEDICINE

## 2017-11-21 PROCEDURE — 25000003 PHARM REV CODE 250: Performed by: NURSE PRACTITIONER

## 2017-11-21 PROCEDURE — 80048 BASIC METABOLIC PNL TOTAL CA: CPT

## 2017-11-21 PROCEDURE — 97802 MEDICAL NUTRITION INDIV IN: CPT

## 2017-11-21 PROCEDURE — 85025 COMPLETE CBC W/AUTO DIFF WBC: CPT

## 2017-11-21 PROCEDURE — 80053 COMPREHEN METABOLIC PANEL: CPT

## 2017-11-21 PROCEDURE — 85610 PROTHROMBIN TIME: CPT

## 2017-11-21 PROCEDURE — 36415 COLL VENOUS BLD VENIPUNCTURE: CPT

## 2017-11-21 PROCEDURE — 21400001 HC TELEMETRY ROOM

## 2017-11-21 PROCEDURE — 80048 BASIC METABOLIC PNL TOTAL CA: CPT | Mod: 91

## 2017-11-21 RX ORDER — LACTULOSE 10 G/15ML
30 SOLUTION ORAL 3 TIMES DAILY
Status: DISCONTINUED | OUTPATIENT
Start: 2017-11-21 | End: 2017-11-23 | Stop reason: HOSPADM

## 2017-11-21 RX ORDER — POTASSIUM CHLORIDE 20 MEQ/1
40 TABLET, EXTENDED RELEASE ORAL ONCE
Status: DISCONTINUED | OUTPATIENT
Start: 2017-11-21 | End: 2017-11-23 | Stop reason: HOSPADM

## 2017-11-21 RX ADMIN — PREDNISONE 40 MG: 20 TABLET ORAL at 09:11

## 2017-11-21 RX ADMIN — LACTULOSE 30 G: 10 SOLUTION ORAL at 02:11

## 2017-11-21 RX ADMIN — CETIRIZINE HYDROCHLORIDE 10 MG: 10 TABLET, FILM COATED ORAL at 09:11

## 2017-11-21 RX ADMIN — PANTOPRAZOLE SODIUM 40 MG: 40 TABLET, DELAYED RELEASE ORAL at 09:11

## 2017-11-21 RX ADMIN — LACTULOSE 30 G: 10 SOLUTION ORAL at 09:11

## 2017-11-21 RX ADMIN — LACTULOSE 30 G: 10 SOLUTION ORAL at 06:11

## 2017-11-21 NOTE — HPI
52 year old female with h/o EtOH abuse, alcoholic hepatitis, anemia, ESLD, hypertension, hyponatremia, liver cirrhosis presented to [facility] on 11/20/17 with generalized malaise.  Workup revealed worsening hyponatremia and Nephrology was consulted to help with patient's renal care. I saw and examined patient in her hospital room. Patient reports generalized weakness and fatigue. Also reports mild dysuria. No other issues at present.  Chart review revealed that hyponatremia is chronic and patient's sodium level has been fluctuating between 120 and 118 over the past 10 days.

## 2017-11-21 NOTE — CONSULTS
"Discussed case with Dr. Souza.   Last admission CMV IgM elevated at 65.1. Will get CMV PCR  EBV negative IgM  All diuretics held at this time and should NOT be restarted at discharge  Fluid restriction.  Will monitor H/H at this time for subjective "dark stools"  Currently on prednisone. Low threshold for paracentesis secondary to immune suppression. Paracentesis as needed for therapeutic purposes. If needed, will get with diagnostics.     "

## 2017-11-21 NOTE — ASSESSMENT & PLAN NOTE
- Initial Na 116, down from 120 on 11/15.  - Will admit to Telemetry.  - 0.9% NS @ 75 mL/hr   -BMP Q8 hours.  - Will hold home diuretics.  - Fluid/free water restriction.  -IVF's stopped   -Current Na 119  - Nephrology consult in AM.

## 2017-11-21 NOTE — PROGRESS NOTES
Patient arrived to room via stretcher from ER accompanied by nurse GUIDO Eckert. Transferred to bed x3 assist. Patient is aaox4 with no distress noted, but very sleepy and weak. Severely distended and with noted jaundice. Full assessment as charted. Patient has been orientated to room, call light, fall precautions, rounding sheet, and board. Heart monitor in place, vital signs taken, no questions or concerns at this time. Pt's sister brought to bedside at this time.

## 2017-11-21 NOTE — CONSULTS
[facility] -   Nephrology  Consult Note    Patient Name: Marcie Bazan  MRN: [id number]  Admission Date: 11/20/2017  Hospital Length of Stay: 1 days  Attending Provider: Stevenson Allison MD   Primary Care Physician: ODILIA Wall  Principal Problem:Chronic hyponatremia    Consults  Subjective:     HPI: 52 year old female with h/o EtOH abuse, alcoholic hepatitis, anemia, ESLD, hypertension, hyponatremia, liver cirrhosis [place] [facility] [place] with generalized malaise.  Workup revealed worsening hyponatremia and Nephrology was consulted to help with patient's renal care. I saw and examined patient in her hospital room. Patient reports generalized weakness and fatigue. Also reports mild dysuria. No other issues at present.  Chart review revealed that hyponatremia is chronic and patient's sodium level has been fluctuating between 120 and 118 over the past 10 days.       Past Medical History:   Diagnosis Date    Alcohol abuse     Alcoholic hepatitis     Anemia of chronic disease     Coagulopathy     End stage liver disease     Hypertension     Hyponatremia     Liver cirrhosis     Liver transplant candidate        Past Surgical History:   Procedure Laterality Date    BREAST SURGERY      CHOLECYSTECTOMY      HYSTERECTOMY         Review of patient's allergies indicates:   Allergen Reactions    Ferrous sulfate Other (See Comments)     Patient states the pill makes her sick. She stated she would rather have a shot     Current Facility-Administered Medications   Medication Frequency    cetirizine tablet 10 mg Daily    lactulose 20 gram/30 mL solution Soln 30 g TID    ondansetron disintegrating tablet 4 mg Q8H PRN    pantoprazole EC tablet 40 mg Daily    predniSONE tablet 40 mg Daily    ramelteon tablet 8 mg Nightly PRN     Family History     Problem Relation (Age of Onset)    Depression Maternal Grandfather    Diabetes Father, Sister    Hypertension Mother, Father        Social  History Main Topics    Smoking status: Never Smoker    Smokeless tobacco: Never Used    Alcohol use Yes      Comment: Currently admitted to inpatient rehab    Drug use: No    Sexual activity: Not Currently     Review of Systems   Constitutional: Positive for fatigue. Negative for fever.        Patient reports generalized weakness.   HENT: Negative for congestion.    Eyes: Negative for visual disturbance.   Respiratory: Negative for cough, shortness of breath and wheezing.    Cardiovascular: Negative for chest pain and palpitations.   Gastrointestinal: Negative for abdominal pain, diarrhea, nausea and vomiting.   Genitourinary: Positive for dysuria. Negative for difficulty urinating.   Musculoskeletal: Negative for joint swelling.   Skin: Negative for rash.   Neurological: Negative for weakness and headaches.     Objective:     Vital Signs (Most Recent):  Temp: 98.4 °F (36.9 °C) (11/21/17 0300)  Pulse: 87 (11/21/17 0300)  Resp: 15 (11/21/17 0300)  BP: (!) 112/54 (11/21/17 0300)  SpO2: 99 % (11/21/17 0300)  O2 Device (Oxygen Therapy): room air (11/21/17 0300) Vital Signs (24h Range):  Temp:  [97.7 °F (36.5 °C)-98.4 °F (36.9 °C)] 98.4 °F (36.9 °C)  Pulse:  [83-95] 87  Resp:  [13-20] 15  SpO2:  [98 %-100 %] 99 %  BP: (102-124)/(51-70) 112/54     Weight: 71.2 kg (156 lb 15.5 oz) (11/20/17 2312)  Body mass index is 26.12 kg/m².  Body surface area is 1.81 meters squared.    I/O last 3 completed shifts:  In: 806.3 [P.O.:150; I.V.:656.3]  Out: -     Physical Exam   Constitutional: She is oriented to person, place, and time. She appears well-developed and well-nourished.   HENT:   Head: Normocephalic.   Eyes: Pupils are equal, round, and reactive to light. Scleral icterus is present.   Neck: No thyromegaly present.   Cardiovascular: Normal rate and regular rhythm.  Exam reveals no friction rub.    Pulmonary/Chest: Effort normal and breath sounds normal. She has no wheezes. She exhibits no tenderness.   Abdominal: Soft.  Bowel sounds are normal. She exhibits distension. There is no tenderness.   Musculoskeletal: She exhibits edema.   Trace LE edema.   Lymphadenopathy:     She has no cervical adenopathy.   Neurological: She is alert and oriented to person, place, and time.   Skin: Skin is warm and dry. No rash noted.   Psychiatric: She has a normal mood and affect.       Significant Labs:  Lab Results   Component Value Date    CREATININE 0.6 11/21/2017    BUN 9 11/21/2017     (LL) 11/21/2017    K 3.6 11/21/2017    CL 90 (L) 11/21/2017    CO2 20 (L) 11/21/2017     Lab Results   Component Value Date    CALCIUM 8.1 (L) 11/21/2017    PHOS 2.4 (L) 11/20/2017     Lab Results   Component Value Date    ALBUMIN 2.1 (L) 11/21/2017     Lab Results   Component Value Date    WBC 10.96 11/21/2017    HGB 10.6 (L) 11/21/2017    HCT 28.9 (L) 11/21/2017    MCV 79 (L) 11/21/2017    PLT 59 (L) 11/21/2017         Significant Imaging:  Imaging Results          X-Ray Chest AP Portable (Final result)  Result time 11/20/17 17:55:44    Final result by Crystal Deshpande MD (11/20/17 17:55:44)                 Impression:     No acute cardiopulmonary disease.            Electronically signed by: CRYSTAL DESHPANDE MD  Date:     11/20/17  Time:    17:55              Narrative:    EXAM:   [id number] CHEST AP PORTABLE    CLINICAL HISTORY:  Chest Pain    COMPARISON: 10/19/2017    Findings:     The lungs are clear. The cardiac silhouette is within normal limits.                                Assessment/Plan:     * Acute on chronic hyponatremia    Patient presents with chronic hyponatremia. Sodium level has been fluctuating between 120 and 118 over the past 10 days. Will try to elevate sodium to about 125. Will initially restrict daily fluid intake to 1000 ml. If patient not responding, would consider Tolvaptan or 3 % saline.  Will monitor closely with repeat BMP.             Thank you for your consult. I will follow-up with patient. Please contact us  if you have any additional questions.    Lito Cullen MD  Nephrology  [facility]

## 2017-11-21 NOTE — SUBJECTIVE & OBJECTIVE
Past Medical History:   Diagnosis Date    Anemia of chronic disease     Coagulopathy     End stage liver disease     Hypertension     Hyponatremia     Liver cirrhosis     Liver transplant candidate        Past Surgical History:   Procedure Laterality Date    BREAST SURGERY      CHOLECYSTECTOMY      HYSTERECTOMY         Review of patient's allergies indicates:   Allergen Reactions    Ferrous sulfate Other (See Comments)     Patient states the pill makes her sick. She stated she would rather have a shot       No current facility-administered medications on file prior to encounter.      Current Outpatient Prescriptions on File Prior to Encounter   Medication Sig    cetirizine (ZYRTEC) 10 MG tablet Take 10 mg by mouth once daily.    furosemide (LASIX) 40 MG tablet Take 1 tablet (40 mg total) by mouth daily as needed.    lactulose (CHRONULAC) 10 gram/15 mL solution TK 45 ML PO QID    lisinopril (PRINIVIL,ZESTRIL) 5 MG tablet Take 1 tablet by mouth only as needed for high blood pressure    omeprazole (PRILOSEC) 40 MG capsule Take 40 mg by mouth once daily.    ondansetron (ZOFRAN-ODT) 4 MG TbDL Take 1 tablet (4 mg total) by mouth every 8 (eight) hours as needed (nausea and vomiting).    potassium chloride SA (K-DUR,KLOR-CON) 20 MEQ tablet Taking only when taking lasix    predniSONE (DELTASONE) 20 MG tablet Take 2 tablets (40 mg total) by mouth once daily.    spironolactone (ALDACTONE) 100 MG tablet Hold until follow-up with GI     Family History     Problem Relation (Age of Onset)    Depression Maternal Grandfather    Diabetes Father, Sister    Hypertension Mother, Father        Social History Main Topics    Smoking status: Never Smoker    Smokeless tobacco: Never Used    Alcohol use Yes      Comment: occasionally    Drug use: No    Sexual activity: Not Currently     Review of Systems   Constitutional: Positive for appetite change (decreased) and fatigue. Negative for chills, diaphoresis, fever and  unexpected weight change.        Malaise   HENT: Negative for congestion, rhinorrhea, sinus pressure, sore throat and trouble swallowing.    Eyes: Negative for photophobia and visual disturbance.   Respiratory: Negative for apnea, cough, chest tightness, shortness of breath and wheezing.    Cardiovascular: Negative for chest pain, palpitations and leg swelling.   Gastrointestinal: Negative for abdominal distention, abdominal pain, blood in stool, constipation, diarrhea, nausea and vomiting.   Endocrine: Negative for polydipsia, polyphagia and polyuria.   Genitourinary: Negative for difficulty urinating, dysuria, frequency, hematuria and urgency.   Musculoskeletal: Negative for arthralgias, back pain, gait problem, joint swelling and myalgias.   Skin: Negative for pallor, rash and wound.   Neurological: Positive for weakness (generalized). Negative for dizziness, tremors, seizures, syncope, speech difficulty, light-headedness, numbness and headaches.   Psychiatric/Behavioral: Negative for agitation, confusion, hallucinations and sleep disturbance. The patient is not nervous/anxious.    All other systems reviewed and are negative.    Objective:     Vital Signs (Most Recent):  Temp: 98.2 °F (36.8 °C) (11/20/17 1923)  Pulse: 89 (11/20/17 1923)  Resp: 13 (11/20/17 1923)  BP: (!) 102/51 (11/20/17 1923)  SpO2: 100 % (11/20/17 1923) Vital Signs (24h Range):  Temp:  [97.7 °F (36.5 °C)-98.2 °F (36.8 °C)] 98.2 °F (36.8 °C)  Pulse:  [86-95] 89  Resp:  [13-20] 13  SpO2:  [98 %-100 %] 100 %  BP: (102-124)/(51-70) 102/51     Weight: 71.2 kg (157 lb)  Body mass index is 26.13 kg/m².    Physical Exam   Constitutional: She is oriented to person, place, and time. She appears well-developed and well-nourished. No distress.   HENT:   Head: Normocephalic and atraumatic.   Eyes: EOM are normal. Pupils are equal, round, and reactive to light. Scleral icterus is present.   Neck: Normal range of motion. Neck supple. No JVD present.    Cardiovascular: Normal rate, regular rhythm, S1 normal, S2 normal and intact distal pulses.   No extrasystoles are present. Exam reveals no gallop.    No murmur heard.  Pulmonary/Chest: Effort normal and breath sounds normal. No respiratory distress. She has no wheezes. She has no rales.   Abdominal: Soft. Bowel sounds are normal. She exhibits distension and ascites. There is no tenderness. There is no rebound, no guarding and no CVA tenderness.   Musculoskeletal: Normal range of motion. She exhibits no edema, tenderness or deformity.   Neurological: She is alert and oriented to person, place, and time. No cranial nerve deficit or sensory deficit. GCS eye subscore is 4. GCS verbal subscore is 5. GCS motor subscore is 6.   Skin: Skin is warm and dry. No rash noted. No erythema.   Psychiatric: She has a normal mood and affect. Judgment normal.   Nursing note and vitals reviewed.       Significant Labs:   Results for orders placed or performed during the hospital encounter of 11/20/17   CBC auto differential   Result Value Ref Range    WBC 12.62 3.90 - 12.70 K/uL    RBC 3.67 (L) 4.00 - 5.40 M/uL    Hemoglobin 10.7 (L) 12.0 - 16.0 g/dL    Hematocrit 28.9 (L) 37.0 - 48.5 %    MCV 79 (L) 82 - 98 fL    MCH 29.2 27.0 - 31.0 pg    MCHC 37.0 (H) 32.0 - 36.0 g/dL    RDW 24.5 (H) 11.5 - 14.5 %    Platelets 76 (L) 150 - 350 K/uL    MPV SEE COMMENT 9.2 - 12.9 fL    Gran # 10.7 (H) 1.8 - 7.7 K/uL    Lymph # 1.1 1.0 - 4.8 K/uL    Mono # 0.8 0.3 - 1.0 K/uL    Eos # 0.0 0.0 - 0.5 K/uL    Baso # 0.00 0.00 - 0.20 K/uL    Gran% 85.3 (H) 38.0 - 73.0 %    Lymph% 8.8 (L) 18.0 - 48.0 %    Mono% 6.1 4.0 - 15.0 %    Eosinophil% 0.2 0.0 - 8.0 %    Basophil% 0.0 0.0 - 1.9 %    Differential Method Automated    Comprehensive metabolic panel   Result Value Ref Range    Sodium 116 (LL) 136 - 145 mmol/L    Potassium 4.1 3.5 - 5.1 mmol/L    Chloride 89 (L) 95 - 110 mmol/L    CO2 18 (L) 23 - 29 mmol/L    Glucose 124 (H) 70 - 110 mg/dL    BUN, Bld 10  6 - 20 mg/dL    Creatinine 0.5 0.5 - 1.4 mg/dL    Calcium 8.7 8.7 - 10.5 mg/dL    Total Protein 6.7 6.0 - 8.4 g/dL    Albumin 2.4 (L) 3.5 - 5.2 g/dL    Total Bilirubin 23.5 (H) 0.1 - 1.0 mg/dL    Alkaline Phosphatase 319 (H) 55 - 135 U/L     (H) 10 - 40 U/L     (H) 10 - 44 U/L    Anion Gap 9 8 - 16 mmol/L    eGFR if African American >60 >60 mL/min/1.73 m^2    eGFR if non African American >60 >60 mL/min/1.73 m^2   Magnesium   Result Value Ref Range    Magnesium 1.8 1.6 - 2.6 mg/dL   Phosphorus   Result Value Ref Range    Phosphorus 2.4 (L) 2.7 - 4.5 mg/dL   Lipase   Result Value Ref Range    Lipase 40 4 - 60 U/L   APTT   Result Value Ref Range    aPTT 35.0 (H) 21.0 - 32.0 sec   Protime-INR   Result Value Ref Range    Prothrombin Time 20.4 (H) 9.0 - 12.5 sec    INR 2.0 (H) 0.8 - 1.2   Urinalysis Catheterized   Result Value Ref Range    Specimen UA Urine, Catheterized     Color, UA Peace Yellow, Straw, Peace    Appearance, UA Hazy (A) Clear    pH, UA SEE COMMENT 5.0 - 8.0    Specific Gravity, UA SEE COMMENT 1.005 - 1.030    Protein, UA SEE COMMENT Negative    Glucose, UA SEE COMMENT Negative    Ketones, UA SEE COMMENT Negative    Bilirubin (UA) SEE COMMENT Negative    Occult Blood UA SEE COMMENT Negative    Nitrite, UA SEE COMMENT Negative    Urobilinogen, UA SEE COMMENT <2.0 EU/dL    Leukocytes, UA SEE COMMENT Negative   Ammonia   Result Value Ref Range    Ammonia 47 10 - 50 umol/L   Urinalysis Microscopic   Result Value Ref Range    WBC, UA 5 0 - 5 /hpf    Microscopic Comment SEE COMMENT       All pertinent labs within the past 24 hours have been reviewed.    Significant Imaging:   Imaging Results          X-Ray Chest AP Portable (Final result)  Result time 11/20/17 17:55:44    Final result by Crystal Deshpande MD (11/20/17 17:55:44)                 Impression:     No acute cardiopulmonary disease.            Electronically signed by: CRYSTAL DESHPANDE MD  Date:     11/20/17  Time:    17:55               Narrative:    EXAM:   [id number] CHEST AP PORTABLE    CLINICAL HISTORY:  Chest Pain    COMPARISON: 10/19/2017    Findings:     The lungs are clear. The cardiac silhouette is within normal limits.                             I have reviewed all pertinent imaging results/findings within the past 24 hours.     EKG: (personally reviewed)  Normal sinus rhythm; No acute changes from previous tracings.

## 2017-11-21 NOTE — ASSESSMENT & PLAN NOTE
Stable. Continue to monitor H/H  Subjective complaint of dark stool. Question as to whether this is melena. Monitor H/H. Will consider EGD if melanic stool witnessed by ICU staff or decrease in H/H.

## 2017-11-21 NOTE — ASSESSMENT & PLAN NOTE
- Increasing MELD-Na score of 32 (score of 29 on 11/10/17), Mortality rate greater than 80%  - Continue home lactulose.  Ammonia WNL at 47.  - LFT's continue to trend up at 484/593 (AST/ALT of 232/199 on 11/10/17).  - Consult Dr. Souza/GI   - Avoid hepatotoxic agents.

## 2017-11-21 NOTE — CONSULTS
[facility] -   Adult Nutrition  Consult Note    SUMMARY     Recommendations    Recommendation/Intervention: 1.Consider SLP evaluation due to swallowing issues. 2.Consider adding oral supplements with meals~ boost plus TID. 3.  Discussed importance of adequate intake and sources of healthy calories to increase overall calorie intake. 4. Will continue to encourage PO intake.   Goals: PO intake greater than 50 % by next RD visit  Nutrition Goal Status: new  Communication of RD Recs:  (care plan and sticky note)      Reason for Assessment    Reason for Assessment: nurse/nurse practitioner consult   diagnosis:  1. Chronic hyponatremia    2. Chest pain    3. Alcoholic cirrhosis of liver with ascites    4. Chronic anemia    5. Hyperbilirubinemia      Past Medical History:   Diagnosis Date    Alcohol abuse     Alcoholic hepatitis     Anemia of chronic disease     Coagulopathy     End stage liver disease     Hypertension     Hyponatremia     Liver cirrhosis     Liver transplant candidate          Interdisciplinary Rounds: attended     General Information Comments: Pt seen this AM. Patient reports decreased appetite (PO intake ~ 25 %). Patient agreed to try oral supplements. Patient reports swallowing issues at home. Patient reports weight loss of 20 lbs(11%) within the last 3 months.     Nutrition Discharge Planning: Home on Cardiac diet     Nutrition Prescription Ordered    Current Diet Order: Regular 1000 ml fluid         Evaluation of Received Nutrients/Fluid Intake        Energy Calories Required: not meeting needs      Protein Required: not meeting needs         Intake/Output Summary (Last 24 hours) at 11/21/17 1104  Last data filed at 11/21/17 0800   Gross per 24 hour   Intake           956.25 ml   Output                0 ml   Net           956.25 ml        % Intake of Estimated Energy Needs: 0 - 25 %  % Meal Intake: 25%     Nutrition Risk Screen     Nutrition Risk Screen: dysphagia or  "difficulty swallowing    Nutrition/Diet History       Typical Food/Fluid Intake: decreased appetite and intake at home  Food Preferences: none reported including [place] or cultural  Factors Affecting Nutritional Intake:  (decreased appetite)       Labs/Tests/Procedures/Meds       Pertinent Labs Reviewed: reviewed, pertinent      BMP  Lab Results   Component Value Date     (LL) 2017    K 3.4 (L) 2017    CL 91 (L) 2017    CO2 21 (L) 2017    BUN 9 2017    CREATININE 0.6 2017    CALCIUM 8.3 (L) 2017    ANIONGAP 7 (L) 2017    ESTGFRAFRICA >60 2017    EGFRNONAA >60 2017     Lab Results   Component Value Date    CALCIUM 8.3 (L) 2017    PHOS 2.4 (L) 2017     Lab Results   Component Value Date    ALBUMIN 2.1 (L) 2017       Pertinent Medications Reviewed: reviewed       Physical Findings    Overall Physical Appearance:  (thin)     Oral/Mouth Cavity:  (WDL)  Skin:  ([place] 16)    Anthropometrics    Temp: 98 °F (36.7 °C)     Height: 5' 5" (165.1 cm)  Weight Method: Bed Scale  Weight: 71.2 kg (156 lb 15.5 oz)     Ideal Body Weight (IBW), Female: 125 lb     % Ideal Body Weight, Female (lb): 125.58 lb  BMI (Calculated): 26.2  BMI Grade: 25 - 29.9 - overweight     Usual Body Weight (UBW), k kg     % Usual Body Weight: 89.19  % Weight Change From Usual Weight: -11 %             Estimated/Assessed Needs    Weight Used For Calorie Calculations: 71.2 kg (156 lb 15.5 oz)   Height (cm): 165.1 cm  Energy Calorie Requirements (kcal): 1705  Energy Need Method: Greenfield-St Jeor (x1.3 )     RMR (Greenfield-St. Jeor Equation): 1312.88      Weight Used For Protein Calculations: 71.2 kg (156 lb 15.5 oz)     1.2 gm Protein (gm): 85.62     Fluid Need Method: RDA Method (or per MD)       Assessment and Plan      Nutrition Problem  Inadequate energy intake    Related to (etiology):   Decreased appetite     Signs and Symptoms (as evidenced by):   PO intake ~ 25 " %    Interventions/Recommendations (treatment strategy):  See above    Nutrition Diagnosis Status:   New      Monitor and Evaluation    Food and Nutrient Intake: energy intake, food and beverage intake  Food and Nutrient Adminstration: diet order  Knowledge/Beliefs/Attitudes: beliefs and attitudes  Biochemical Data, Medical Tests and Procedures: electrolyte and renal panel, glucose/endocrine profile  Nutrition-Focused Physical Findings: overall appearance        Nutrition Follow-Up    RD Follow-up?: Yes (2xweekly)

## 2017-11-21 NOTE — PLAN OF CARE
Assessment completed. CM met with the patient. The patient stated shewas told per MD , she may have to be transferred to      11/21/17 3103   Discharge Assessment   Assessment Type Discharge Planning Assessment   Confirmed/corrected address and phone number on facesheet? Yes   Assessment information obtained from? Patient;Medical Record   Expected Length of Stay (days) (TBD)   Communicated expected length of stay with patient/caregiver no   Prior to hospitilization cognitive status: Alert/Oriented   Prior to hospitalization functional status: Independent   Current cognitive status: Alert/Oriented   Current Functional Status: Independent   Lives With child(sae), adult  (Presently at inpatient rehab- [facility])   Able to Return to Prior Arrangements unable to determine at this time (comments)   Is patient able to care for self after discharge? Yes   Patient's perception of discharge disposition other (comments)  (Patient stated she may be transferred to [facility]- until liver comes or back home. She was not quite sure she will return to rehab because she is so ill.)   Readmission Within The Last 30 Days other (see comments)  (Patient return w/ exac of previous dx)   Patient currently being followed by outpatient case management? No   Patient currently receives any other outside agency services? No   Equipment Currently Used at Home rollator   Do you have any problems affording any of your prescribed medications? No   Is the patient taking medications as prescribed? yes   Does the patient have transportation home? Yes   Transportation Available family or friend will provide   Does the patient receive services at the [facility]? No   Discharge Plan A Other   Discharge Plan B Other   Patient/Family In Agreement With Plan other (see comments)   C-NO until liver becomes available duto her being so ill. CM asked if she does not have to transfer where will she go / Patient stated home. CM asked when did she  go to the recovery program? Patient stated her sobriety started 11/1. CM stated she will need to return to the recovery home to complete the program to ensure she completes her stay to remain sober. She stated she gets more ill there. CM stated she will f/u for further plans for safe transition to the next level of care. CM gave patient info on and explained Advance Directives , Living Will , Pamphlet on d/c planning begins on admission and [place] bedside delivery.

## 2017-11-21 NOTE — ASSESSMENT & PLAN NOTE
- Admitted to ICU for hyponatremia Na improving to 119. This is not far from her baseline over the last month. Goal >125  - Not currently on liver transplant list secondary to alcohol abuse.   - Diuretics on hold  - 1.5L fluid restriction  - Ascites present but per patient is not increased from baseline. No SOB at rest  - No overt confusion  - Will discuss case with Dr. Souza for input. Prognosis guarded.   - Continue supportive care per ICU team.

## 2017-11-21 NOTE — PLAN OF CARE
Problem: Patient Care Overview  Goal: Plan of Care Review  Outcome: Ongoing (interventions implemented as appropriate)  POC discussed w/patient and sister, verbalized understanding. Pt AAOx4 but sleepy and pt sleeps easily b/t care. Q4 neuro checks. SR on monitor. VSS. NS @ 75mL/hr. Last sodium still at 116; CMP ordered Q8. Lactulose ordered to start this AM. Patient turns independently in bed, no skin breakdown noted. Fall precautions in place, bed alarm on. Patient slept most of shift w/o complaint.

## 2017-11-21 NOTE — HPI
"Patient is a 53 yo  female with a history of decompensated alcoholic liver cirrhosis. She was transferred to the ED from inpatient rehab facility for worsening fatigue, malaise, lethargy, generalized weakness, and decreased appetite over the past 1 week. She is well known to the liver clinic and has been followed closely by Dr. Souza. She was recently discharged from [facility] for hyponatremia and worsening liver disease. She was previously a candidate for liver transplant but was denied secondary to alcohol abuse. She has been in inpatient rehab since last hospitalization in hopes to become a candidate for liver transplant again. She is currently in ICU and reports being extremely fatigued and weak. She denies any recent medication changes. She is afebrile. No overt confusion. Upon questioning, she reports some "dark" stools but was not able to tell me if this was new or has been going on. She denies any SOB at rest. No worsening ascites. Labs in ER H/H 10.7/28.9, Platelets 59, Sodium 116, Alk phos 284, Bili 23.5, INR 2.0 AST//593, MELD 32.   "

## 2017-11-21 NOTE — HOSPITAL COURSE
52 year old AAF with alcoholic hepatitis admitted for chronic hyponatremia. ED labs revealed Na 116, , , ammonia 47, total bili 23.5, INR 2, H&H 10.7/29, plt 76; VSS. CXR and EKG unremarkable. Nephrology and GI consulted. Patient was recently admitted for hyponatremia and worsening liver disease. Sodium goal is 125. Diuretics held and fluid restriction initiated. MELD score 32. Paracentesis 11/22/17 removing 2 L of fluid. Ascitic analysis negative for SBP. She has required straight catheter x 2 since yesterday. Meld score has been greater than 30 since admission. Case discussed with Dr. Souza who feels patient will benefit from transfer for further evaluation. She spoke with Dr. Lal who agreed with above. Case discussed with Dr. Poole who agrees to accept patient for transfer. Patient will transfer to [facility] for further treatment of liver disease. Patient seen and examined on date of discharge and deemed suitable.

## 2017-11-21 NOTE — PLAN OF CARE
Problem: Patient Care Overview  Goal: Plan of Care Review  Outcome: Ongoing (interventions implemented as appropriate)  Pt stable through out shift.  Denies pain.  Na improving.  Plan of care reviewed w pt.

## 2017-11-21 NOTE — SUBJECTIVE & OBJECTIVE
Interval History: Na level up to 119, IVF's stopped. Repeat BMP q8 hours. Will continue with current management plan.   Review of Systems   Constitutional: Positive for appetite change (decreased) and fatigue. Negative for chills, diaphoresis, fever and unexpected weight change.        Malaise   HENT: Negative for congestion, rhinorrhea, sinus pressure, sore throat and trouble swallowing.    Eyes: Negative for photophobia and visual disturbance.   Respiratory: Negative for apnea, cough, chest tightness, shortness of breath and wheezing.    Cardiovascular: Negative for chest pain, palpitations and leg swelling.   Gastrointestinal: Positive for abdominal distention. Negative for abdominal pain, blood in stool, constipation, diarrhea, nausea and vomiting.   Endocrine: Negative for polydipsia, polyphagia and polyuria.   Genitourinary: Negative for difficulty urinating, dysuria, frequency, hematuria and urgency.   Musculoskeletal: Positive for arthralgias. Negative for back pain, gait problem, joint swelling and myalgias.   Skin: Negative for pallor, rash and wound.   Neurological: Positive for weakness (generalized). Negative for dizziness, tremors, seizures, syncope, speech difficulty, light-headedness, numbness and headaches.   Psychiatric/Behavioral: Negative for agitation, confusion, hallucinations and sleep disturbance. The patient is not nervous/anxious.    All other systems reviewed and are negative.    Objective:     Vital Signs (Most Recent):  Temp: 98 °F (36.7 °C) (11/21/17 1200)  Pulse: 93 (11/21/17 1200)  Resp: 20 (11/21/17 1200)  BP: 122/70 (11/21/17 1200)  SpO2: 100 % (11/21/17 1200) Vital Signs (24h Range):  Temp:  [97.7 °F (36.5 °C)-98.4 °F (36.9 °C)] 98 °F (36.7 °C)  Pulse:  [83-95] 93  Resp:  [13-20] 20  SpO2:  [98 %-100 %] 100 %  BP: (102-124)/(51-70) 122/70     Weight: 71.2 kg (156 lb 15.5 oz)  Body mass index is 26.12 kg/m².    Intake/Output Summary (Last 24 hours) at 11/21/17 4611  Last data filed  at 11/21/17 0800   Gross per 24 hour   Intake           956.25 ml   Output                0 ml   Net           956.25 ml      Physical Exam   Constitutional: She is oriented to person, place, and time. She appears well-developed. She has a sickly appearance.   HENT:   Head: Normocephalic.   Eyes: Pupils are equal, round, and reactive to light. Scleral icterus is present.   Neck: No thyromegaly present.   Cardiovascular: Normal rate and regular rhythm.  Exam reveals no friction rub.    Pulmonary/Chest: Effort normal and breath sounds normal. She has no wheezes. She exhibits no tenderness.   Abdominal: Soft. Bowel sounds are normal. She exhibits distension and ascites. There is no tenderness.   Musculoskeletal: She exhibits edema.   Trace LE edema.   Lymphadenopathy:     She has no cervical adenopathy.   Neurological: She is alert and oriented to person, place, and time.   Skin: Skin is warm and dry. No rash noted.   Psychiatric: She has a normal mood and affect.       Significant Labs:   BMP:   Recent Labs  Lab 11/20/17  1724  11/21/17  0834   *  < > 112*   *  < > 119*   K 4.1  < > 3.4*   CL 89*  < > 91*   CO2 18*  < > 21*   BUN 10  < > 9   CREATININE 0.5  < > 0.6   CALCIUM 8.7  < > 8.3*   MG 1.8  --   --    < > = values in this interval not displayed.  CBC:   Recent Labs  Lab 11/20/17  1724 11/21/17  0346   WBC 12.62 10.96   HGB 10.7* 10.6*   HCT 28.9* 28.9*   PLT 76* 59*     CMP:   Recent Labs  Lab 11/20/17  1724 11/21/17  0032 11/21/17  0346 11/21/17  0834   * 116* 117* 119*   K 4.1 3.6 3.6 3.4*   CL 89* 89* 90* 91*   CO2 18* 19* 20* 21*   * 141* 107 112*   BUN 10 10 9 9   CREATININE 0.5 0.6 0.6 0.6   CALCIUM 8.7 8.3* 8.1* 8.3*   PROT 6.7  --  5.6*  --    ALBUMIN 2.4*  --  2.1*  --    BILITOT 23.5*  --  20.0*  --    ALKPHOS 319*  --  284*  --    *  --  376*  --    *  --  491*  --    ANIONGAP 9 8 7* 7*   EGFRNONAA >60 >60 >60 >60     Coagulation:   Recent Labs  Lab  11/20/17  1724 11/21/17  0346   INR 2.0* 2.0*   APTT 35.0*  --      All pertinent labs within the past 24 hours have been reviewed.    Significant Imaging: ,  Imaging Results          X-Ray Chest AP Portable (Final result)  Result time 11/20/17 17:55:44    Final result by Crystal Deshpande MD (11/20/17 17:55:44)                 Impression:     No acute cardiopulmonary disease.            Electronically signed by: CRYSTAL DESHPANDE MD  Date:     11/20/17  Time:    17:55              Narrative:    EXAM:   [id number] CHEST AP PORTABLE    CLINICAL HISTORY:  Chest Pain    COMPARISON: 10/19/2017    Findings:     The lungs are clear. The cardiac silhouette is within normal limits.

## 2017-11-21 NOTE — PROGRESS NOTES
[facility] Hospital Medicine  Progress Note    Patient Name: Marcie Bazan  MRN: [id number]  Patient Class: IP- Inpatient   Admission Date: 11/20/2017  Length of Stay: 1 days  Attending Physician: Stevenson Allison MD  Primary Care Provider: ODILIA Wall        Subjective:     Principal Problem:Chronic hyponatremia    HPI:  Ms. Bazan is a 53 yo female with a PMHx of alcoholic hepatitis and cirrhosis with ESLD (progressively worsening MELD-Na score of 32) on liver transplant list, who was sent from inpatient rehab to ED for c/o fatigue, malaise, lethargy, generalized weakness, and decreased appetite over the past 1 week.  She was recently discharged from [facility] 11/15 after a 5 day hospitalization for hyponatremia and worsening liver disease.  She was discharged to [facility] for inpatient EtOH rehab, where she has steadily declined.  Initial work-up in ED resulted Na 116, , , ammonia 47, total bili 23.5, INR 2, H&H 10.7/29, plt 76; VSS.   CXR and EKG unremarkable.  Hospital Medicine was consulted for admission.  Currently, patient is drowsy but easily aroused.  Denies any lightheadedness/dizziness, syncope, AMS, visual disturbances, focal deficits, numbness/tingling, HA, seizure-like activity, chest pain, palpitations, SOB, cough, orthopnea, PND, edema, weight gain, ABD pain, N/V/D, dysuria, hematuria, fever, or chills.   She is followed closely by Dr. Souza for her ESLD.    Hospital Course:  52 year old AAF with alcoholic hepatitis admitted for chronic hyponatremia. ED labs revealed Na 116, , , ammonia 47, total bili 23.5, INR 2, H&H 10.7/29, plt 76; VSS. CXR and EKG unremarkable. Nephrology and GI consulted. Patient was recently admitted for hyponatremia and worsening liver disease. Sodium goal is 125. Diuretics held and fluid restriction initiated. MELD score 32, mortality rate greater than 80%.     Interval History: Na level up to 119,  IVF's stopped. Repeat BMP q8 hours. Will continue with current management plan.   Review of Systems   Constitutional: Positive for appetite change (decreased) and fatigue. Negative for chills, diaphoresis, fever and unexpected weight change.        Malaise   HENT: Negative for congestion, rhinorrhea, sinus pressure, sore throat and trouble swallowing.    Eyes: Negative for photophobia and visual disturbance.   Respiratory: Negative for apnea, cough, chest tightness, shortness of breath and wheezing.    Cardiovascular: Negative for chest pain, palpitations and leg swelling.   Gastrointestinal: Positive for abdominal distention. Negative for abdominal pain, blood in stool, constipation, diarrhea, nausea and vomiting.   Endocrine: Negative for polydipsia, polyphagia and polyuria.   Genitourinary: Negative for difficulty urinating, dysuria, frequency, hematuria and urgency.   Musculoskeletal: Positive for arthralgias. Negative for back pain, gait problem, joint swelling and myalgias.   Skin: Negative for pallor, rash and wound.   Neurological: Positive for weakness (generalized). Negative for dizziness, tremors, seizures, syncope, speech difficulty, light-headedness, numbness and headaches.   Psychiatric/Behavioral: Negative for agitation, confusion, hallucinations and sleep disturbance. The patient is not nervous/anxious.    All other systems reviewed and are negative.    Objective:     Vital Signs (Most Recent):  Temp: 98 °F (36.7 °C) (11/21/17 1200)  Pulse: 93 (11/21/17 1200)  Resp: 20 (11/21/17 1200)  BP: 122/70 (11/21/17 1200)  SpO2: 100 % (11/21/17 1200) Vital Signs (24h Range):  Temp:  [97.7 °F (36.5 °C)-98.4 °F (36.9 °C)] 98 °F (36.7 °C)  Pulse:  [83-95] 93  Resp:  [13-20] 20  SpO2:  [98 %-100 %] 100 %  BP: (102-124)/(51-70) 122/70     Weight: 71.2 kg (156 lb 15.5 oz)  Body mass index is 26.12 kg/m².    Intake/Output Summary (Last 24 hours) at 11/21/17 3502  Last data filed at 11/21/17 0800   Gross per 24 hour    Intake           956.25 ml   Output                0 ml   Net           956.25 ml      Physical Exam   Constitutional: She is oriented to person, place, and time. She appears well-developed. She has a sickly appearance.   HENT:   Head: Normocephalic.   Eyes: Pupils are equal, round, and reactive to light. Scleral icterus is present.   Neck: No thyromegaly present.   Cardiovascular: Normal rate and regular rhythm.  Exam reveals no friction rub.    Pulmonary/Chest: Effort normal and breath sounds normal. She has no wheezes. She exhibits no tenderness.   Abdominal: Soft. Bowel sounds are normal. She exhibits distension and ascites. There is no tenderness.   Musculoskeletal: She exhibits edema.   Trace LE edema.   Lymphadenopathy:     She has no cervical adenopathy.   Neurological: She is alert and oriented to person, place, and time.   Skin: Skin is warm and dry. No rash noted.   Psychiatric: She has a normal mood and affect.       Significant Labs:   BMP:   Recent Labs  Lab 11/20/17 1724 11/21/17  0834   *  < > 112*   *  < > 119*   K 4.1  < > 3.4*   CL 89*  < > 91*   CO2 18*  < > 21*   BUN 10  < > 9   CREATININE 0.5  < > 0.6   CALCIUM 8.7  < > 8.3*   MG 1.8  --   --    < > = values in this interval not displayed.  CBC:   Recent Labs  Lab 11/20/17 1724 11/21/17  0346   WBC 12.62 10.96   HGB 10.7* 10.6*   HCT 28.9* 28.9*   PLT 76* 59*     CMP:   Recent Labs  Lab 11/20/17  1724 11/21/17  0032 11/21/17  0346 11/21/17  0834   * 116* 117* 119*   K 4.1 3.6 3.6 3.4*   CL 89* 89* 90* 91*   CO2 18* 19* 20* 21*   * 141* 107 112*   BUN 10 10 9 9   CREATININE 0.5 0.6 0.6 0.6   CALCIUM 8.7 8.3* 8.1* 8.3*   PROT 6.7  --  5.6*  --    ALBUMIN 2.4*  --  2.1*  --    BILITOT 23.5*  --  20.0*  --    ALKPHOS 319*  --  284*  --    *  --  376*  --    *  --  491*  --    ANIONGAP 9 8 7* 7*   EGFRNONAA >60 >60 >60 >60     Coagulation:   Recent Labs  Lab 11/20/17  1724 11/21/17  0346   INR 2.0* 2.0*    APTT 35.0*  --      All pertinent labs within the past 24 hours have been reviewed.    Significant Imaging: ,  Imaging Results          X-Ray Chest AP Portable (Final result)  Result time 11/20/17 17:55:44    Final result by Crystal Deshpande MD (11/20/17 17:55:44)                 Impression:     No acute cardiopulmonary disease.            Electronically signed by: CRYSTAL DESHPANDE MD  Date:     11/20/17  Time:    17:55              Narrative:    EXAM:   [id number] CHEST AP PORTABLE    CLINICAL HISTORY:  Chest Pain    COMPARISON: 10/19/2017    Findings:     The lungs are clear. The cardiac silhouette is within normal limits.                            Assessment/Plan:      * Acute on chronic hyponatremia    - Initial Na 116, down from 120 on 11/15.  - Will admit to Telemetry.  - 0.9% NS @ 75 mL/hr   -BMP Q8 hours.  - Will hold home diuretics.  - Fluid/free water restriction.  -IVF's stopped   -Current Na 119  - Nephrology consult in AM.        End stage liver disease    - Increasing MELD-Na score of 32 (score of 29 on 11/10/17), Mortality rate greater than 80%  - Continue home lactulose.  Ammonia WNL at 47.  - LFT's continue to trend up at 484/593 (AST/ALT of 232/199 on 11/10/17).  - Consult Dr. Souza/GI   - Avoid hepatotoxic agents.        Alcoholic hepatitis with ascites- decompensated with elevated MELD    - Last paracentesis 11/9/17.  - Continue home Prednisone.  - GI consult         Moderate protein-calorie malnutrition    - Dietary consult.        Coagulopathy    - Secondary to ESLD.  - No evidence of active bleeding.  - INR/platelets stable.  Monitor.        Essential hypertension    - BP well controlled currently without antihypertensives.  - Will hold home BP meds to prevent hypotension.  Monitor BP trends, and resume as needed.        Anemia of chronic disease    - H&H stable.  Monitor.          VTE Risk Mitigation         Ordered     Medium Risk of VTE  Once      11/20/17 2304     Place  sequential compression device  Until discontinued      11/20/17 3142            Vira Amador NP  Department of Hospital Medicine   [facility]

## 2017-11-21 NOTE — ASSESSMENT & PLAN NOTE
- Na 116, down from 120 on 11/15.  - Will admit to Telemetry.  - 0.9% NS @ 75 mL/hr with BMP Q8 hours.  - Will hold home diuretics.  - Fluid/free water restriction.  - Nephrology consult in AM.

## 2017-11-21 NOTE — CONSULTS
"[facility]  Pulmonology  Consult Note    Patient Name: Marcie Bazan  MRN: [id number]  Admission Date: 11/20/2017  Hospital Length of Stay: 1 days  Code Status: Full Code  Attending Physician: Stevenson Allison MD  Primary Care Provider: ODILIA Wall   Principal Problem: End stage liver disease      Subjective:     HPI:  Patient is a 51 yo  female with a history of decompensated alcoholic liver cirrhosis. She was transferred to the ED from inpatient rehab facility for worsening fatigue, malaise, lethargy, generalized weakness, and decreased appetite over the past 1 week. She is well known to the liver clinic and has been followed closely by Dr. Souza. She was recently discharged from [facility] for hyponatremia and worsening liver disease. She was previously a candidate for liver transplant but was denied secondary to alcohol abuse. She has been in inpatient rehab since last hospitalization in hopes to become a candidate for liver transplant again. She is currently in ICU and reports being extremely fatigued and weak. She denies any recent medication changes. She is afebrile. No overt confusion. Upon questioning, she reports some "dark" stools but was not able to tell me if this was new or has been going on. She denies any SOB at rest. No worsening ascites. Labs in ER H/H 10.7/28.9, Platelets 59, Sodium 116, Alk phos 284, Bili 23.5, INR 2.0 AST//593, MELD 32.     Past Medical History:   Diagnosis Date    Alcohol abuse     Alcoholic hepatitis     Anemia of chronic disease     Coagulopathy     End stage liver disease     Hypertension     Hyponatremia     Liver cirrhosis     Liver transplant candidate        Past Surgical History:   Procedure Laterality Date    BREAST SURGERY      CHOLECYSTECTOMY      HYSTERECTOMY         Review of patient's allergies indicates:   Allergen Reactions    Ferrous sulfate Other (See Comments)     Patient states the pill makes her " sick. She stated she would rather have a shot       Family History     Problem Relation (Age of Onset)    Depression Maternal Grandfather    Diabetes Father, Sister    Hypertension Mother, Father        Social History Main Topics    Smoking status: Never Smoker    Smokeless tobacco: Never Used    Alcohol use Yes      Comment: Currently admitted to inpatient rehab    Drug use: No    Sexual activity: Not Currently         Review of Systems   Constitutional: Positive for activity change, appetite change and fatigue.   Respiratory: Negative for chest tightness and shortness of breath.    Cardiovascular: Negative for chest pain.   Gastrointestinal: Positive for abdominal distention. Negative for abdominal pain, anal bleeding, blood in stool, constipation, diarrhea, nausea, rectal pain and vomiting.   Musculoskeletal: Positive for arthralgias.   Psychiatric/Behavioral: Negative for confusion.     Objective:     Vital Signs (Most Recent):  Temp: 98 °F (36.7 °C) (11/21/17 1200)  Pulse: 93 (11/21/17 1200)  Resp: 20 (11/21/17 1200)  BP: 122/70 (11/21/17 1200)  SpO2: 100 % (11/21/17 1200) Vital Signs (24h Range):  Temp:  [97.7 °F (36.5 °C)-98.4 °F (36.9 °C)] 98 °F (36.7 °C)  Pulse:  [83-95] 93  Resp:  [13-20] 20  SpO2:  [98 %-100 %] 100 %  BP: (102-124)/(51-70) 122/70     Weight: 71.2 kg (156 lb 15.5 oz)  Body mass index is 26.12 kg/m².      Intake/Output Summary (Last 24 hours) at 11/21/17 1405  Last data filed at 11/21/17 0800   Gross per 24 hour   Intake           956.25 ml   Output                0 ml   Net           956.25 ml       Physical Exam   Constitutional: She is oriented to person, place, and time.   Eyes: Scleral icterus is present.   Cardiovascular: Normal rate, regular rhythm and normal heart sounds.    No murmur heard.  Pulmonary/Chest: Effort normal and breath sounds normal. No respiratory distress. She has no wheezes.   Abdominal: Bowel sounds are normal. She exhibits distension. There is no tenderness.    Abdominal ascites    Neurological: She is alert and oriented to person, place, and time.   Psychiatric: She has a normal mood and affect. Her behavior is normal.       Vents:       Lines/Drains/Airways     Peripheral Intravenous Line                 Peripheral IV - Single Lumen 11/21/17 1104 Left Forearm less than 1 day                Significant Labs:    CBC/Anemia Profile:    Recent Labs  Lab 11/20/17  1724 11/21/17  0346   WBC 12.62 10.96   HGB 10.7* 10.6*   HCT 28.9* 28.9*   PLT 76* 59*   MCV 79* 79*   RDW 24.5* 24.5*        Chemistries:    Recent Labs  Lab 11/20/17  1724 11/21/17  0032 11/21/17  0346 11/21/17  0834   * 116* 117* 119*   K 4.1 3.6 3.6 3.4*   CL 89* 89* 90* 91*   CO2 18* 19* 20* 21*   BUN 10 10 9 9   CREATININE 0.5 0.6 0.6 0.6   CALCIUM 8.7 8.3* 8.1* 8.3*   ALBUMIN 2.4*  --  2.1*  --    PROT 6.7  --  5.6*  --    BILITOT 23.5*  --  20.0*  --    ALKPHOS 319*  --  284*  --    *  --  491*  --    *  --  376*  --    MG 1.8  --   --   --    PHOS 2.4*  --   --   --      MELD-Na score: 32 at 11/21/2017  8:34 AM  MELD score: 26 at 11/21/2017  8:34 AM  Calculated from:  Serum Creatinine: 0.6 mg/dL (Rounded to 1) at 11/21/2017  8:34 AM  Serum Sodium: 119 mmol/L (Rounded to 125) at 11/21/2017  8:34 AM  Total Bilirubin: 20.0 mg/dL at 11/21/2017  3:46 AM  INR(ratio): 2.0 at 11/21/2017  3:46 AM  Age: 52 years      Significant Imaging:   I have reviewed and interpreted all pertinent imaging results/findings within the past 24 hours.    Assessment/Plan:     Alcoholic hepatitis with ascites- decompensated with elevated MELD    - Admitted to ICU for hyponatremia Na improving to 119. This is not far from her baseline over the last month. Goal >125  - Not currently on liver transplant list secondary to alcohol abuse.   - Diuretics on hold  - 1.5L fluid restriction  - Ascites present but per patient is not increased from baseline. No SOB at rest  - No overt confusion  - Will discuss case with   Harley for input. Prognosis guarded.   - Continue supportive care per ICU team.              Acute on chronic hyponatremia    Likely secondary to liver disease. Nephrology following        Coagulopathy    Secondary to alcoholic cirrhosis. Plan as above.         Anemia of chronic disease    Stable. Continue to monitor H/H  Subjective complaint of dark stool. Question as to whether this is melena. Monitor H/H. Will consider EGD if melanic stool witnessed by ICU staff or decrease in H/H.              Thank you for your consult. I will follow-up with patient. Please contact us if you have any additional questions.     Yaquelin Restrepo NP  Pulmonology  [facility]

## 2017-11-21 NOTE — PLAN OF CARE
11/21/17 1403   Readmission Questionnaire   At the time of your discharge, did someone talk to you about what your health problems were? Yes   At the time of discharge, did someone talk to you about what to watch out for regarding worsening of your health problem? Yes   At the time of discharge, did someone talk to you about what to do if you experienced worsening of your health problem? Yes   At the time of discharge, did someone talk to you about which medication to take when you left the hospital and which ones to stop taking? Yes   At the time of discharge, did someone talk to you about when and where to follow up with a doctor after you left the hospital? Yes   How often do you need to have someone help you when you read instructions, pamphlets, or other written material from your doctor or pharmacy? Rarely   Do you have problems taking your medications as prescribed? No   Do you have any problems affording any of  your prescribed medications? No   Do you have problems obtaining/receiving your medications? No   Lives With child(sae), adult   Living Arrangements other (see comments)  (Rehab facility)   Does the patient have family/friends to help with healtcare needs after discharge? yes   If no, what kind of help do you need at home? Patient at rehab facility   Are you currently feeling confused? No   Are you currently having problems thinking? No   Are you currently having memory problems? No   Have you felt down, depressed, or hopeless? 0   Have you felt little interest or pleasure in doing things? 0

## 2017-11-21 NOTE — ED NOTES
Pt lying in bed, aaox4, in no acute distress and no current complaint. Pt aware of plan to admit for hyponatremia and that she is awaiting a bed assignment. Will continue to monitor. Bed in low position, side rails up, call light in reach, family at bedside.

## 2017-11-21 NOTE — ASSESSMENT & PLAN NOTE
Patient presents with chronic hyponatremia. Sodium level has been fluctuating between 120 and 118 over the past 10 days. Will try to elevate sodium to about 125. Will initially restrict daily fluid intake to 1000 ml. If patient not responding, would consider Tolvaptan or 3 % saline.  Will monitor closely with repeat BMP.

## 2017-11-21 NOTE — HPI
Ms. Bazan is a 53 yo female with a PMHx of alcoholic hepatitis and cirrhosis with ESLD (progressively worsening MELD-Na score of 32) on liver transplant list, who was sent from inpatient rehab to ED for c/o fatigue, malaise, lethargy, generalized weakness, and decreased appetite over the past 1 week.  She was recently discharged from [facility] 11/15 after a 5 day hospitalization for hyponatremia and worsening liver disease.  She was discharged to [facility] for inpatient EtOH rehab, where she has steadily declined.  Initial work-up in ED resulted Na 116, , , ammonia 47, total bili 23.5, INR 2, H&H 10.7/29, plt 76; VSS.  CXR and EKG unremarkable.  Hospital Medicine was consulted for admission.  Currently, patient is drowsy but easily aroused.  Denies any lightheadedness/dizziness, syncope, AMS, visual disturbances, focal deficits, numbness/tingling, HA, seizure-like activity, chest pain, palpitations, SOB, cough, orthopnea, PND, edema, weight gain, ABD pain, N/V/D, dysuria, hematuria, fever, or chills.  She is followed closely by Dr. Souza for her ESLD.

## 2017-11-21 NOTE — H&P
"[facility] Hospital Medicine  History & Physical    Patient Name: Marcie Bazan  MRN: [id number]  Admission Date: 11/20/2017  Attending Physician: Stevenson Allison MD   Primary Care Provider: ODILIA Wall         Patient information was obtained from patient, relative(s), past medical records and ER records.     Subjective:     Principal Problem:Chronic hyponatremia    Chief Complaint:   Chief Complaint   Patient presents with    Weakness     Daughter states, "SHe has liver disease and she says that her whole body hurts."        HPI: Ms. Bazan is a 53 yo female with a PMHx of alcoholic hepatitis and cirrhosis with ESLD (progressively worsening MELD-Na score of 32) on liver transplant list, who was sent from inpatient rehab to ED for c/o fatigue, malaise, lethargy, generalized weakness, and decreased appetite over the past 1 week.  She was recently discharged from [facility] 11/15 after a 5 day hospitalization for hyponatremia and worsening liver disease.  She was discharged to [facility] for inpatient EtOH rehab, where she has steadily declined.  Initial work-up in ED resulted Na 116, , , ammonia 47, total bili 23.5, INR 2, H&H 10.7/29, plt 76; VSS.   CXR and EKG unremarkable.  Hospital Medicine was consulted for admission.  Currently, patient is drowsy but easily aroused.  Denies any lightheadedness/dizziness, syncope, AMS, visual disturbances, focal deficits, numbness/tingling, HA, seizure-like activity, chest pain, palpitations, SOB, cough, orthopnea, PND, edema, weight gain, ABD pain, N/V/D, dysuria, hematuria, fever, or chills.   She is followed closely by Dr. Souza for her ESLD.    Past Medical History:   Diagnosis Date    Anemia of chronic disease     Coagulopathy     End stage liver disease     Hypertension     Hyponatremia     Liver cirrhosis     Liver transplant candidate        Past Surgical History:   Procedure Laterality Date    " BREAST SURGERY      CHOLECYSTECTOMY      HYSTERECTOMY         Review of patient's allergies indicates:   Allergen Reactions    Ferrous sulfate Other (See Comments)     Patient states the pill makes her sick. She stated she would rather have a shot       No current facility-administered medications on file prior to encounter.      Current Outpatient Prescriptions on File Prior to Encounter   Medication Sig    cetirizine (ZYRTEC) 10 MG tablet Take 10 mg by mouth once daily.    furosemide (LASIX) 40 MG tablet Take 1 tablet (40 mg total) by mouth daily as needed.    lactulose (CHRONULAC) 10 gram/15 mL solution TK 45 ML PO QID    lisinopril (PRINIVIL,ZESTRIL) 5 MG tablet Take 1 tablet by mouth only as needed for high blood pressure    omeprazole (PRILOSEC) 40 MG capsule Take 40 mg by mouth once daily.    ondansetron (ZOFRAN-ODT) 4 MG TbDL Take 1 tablet (4 mg total) by mouth every 8 (eight) hours as needed (nausea and vomiting).    potassium chloride SA (K-DUR,KLOR-CON) 20 MEQ tablet Taking only when taking lasix    predniSONE (DELTASONE) 20 MG tablet Take 2 tablets (40 mg total) by mouth once daily.    spironolactone (ALDACTONE) 100 MG tablet Hold until follow-up with GI     Family History     Problem Relation (Age of Onset)    Depression Maternal Grandfather    Diabetes Father, Sister    Hypertension Mother, Father        Social History Main Topics    Smoking status: Never Smoker    Smokeless tobacco: Never Used    Alcohol use Yes      Comment: occasionally    Drug use: No    Sexual activity: Not Currently     Review of Systems   Constitutional: Positive for appetite change (decreased) and fatigue. Negative for chills, diaphoresis, fever and unexpected weight change.        Malaise   HENT: Negative for congestion, rhinorrhea, sinus pressure, sore throat and trouble swallowing.    Eyes: Negative for photophobia and visual disturbance.   Respiratory: Negative for apnea, cough, chest tightness, shortness  of breath and wheezing.    Cardiovascular: Negative for chest pain, palpitations and leg swelling.   Gastrointestinal: Negative for abdominal distention, abdominal pain, blood in stool, constipation, diarrhea, nausea and vomiting.   Endocrine: Negative for polydipsia, polyphagia and polyuria.   Genitourinary: Negative for difficulty urinating, dysuria, frequency, hematuria and urgency.   Musculoskeletal: Negative for arthralgias, back pain, gait problem, joint swelling and myalgias.   Skin: Negative for pallor, rash and wound.   Neurological: Positive for weakness (generalized). Negative for dizziness, tremors, seizures, syncope, speech difficulty, light-headedness, numbness and headaches.   Psychiatric/Behavioral: Negative for agitation, confusion, hallucinations and sleep disturbance. The patient is not nervous/anxious.    All other systems reviewed and are negative.    Objective:     Vital Signs (Most Recent):  Temp: 98.2 °F (36.8 °C) (11/20/17 1923)  Pulse: 89 (11/20/17 1923)  Resp: 13 (11/20/17 1923)  BP: (!) 102/51 (11/20/17 1923)  SpO2: 100 % (11/20/17 1923) Vital Signs (24h Range):  Temp:  [97.7 °F (36.5 °C)-98.2 °F (36.8 °C)] 98.2 °F (36.8 °C)  Pulse:  [86-95] 89  Resp:  [13-20] 13  SpO2:  [98 %-100 %] 100 %  BP: (102-124)/(51-70) 102/51     Weight: 71.2 kg (157 lb)  Body mass index is 26.13 kg/m².    Physical Exam   Constitutional: She is oriented to person, place, and time. She appears well-developed and well-nourished. No distress.   HENT:   Head: Normocephalic and atraumatic.   Eyes: EOM are normal. Pupils are equal, round, and reactive to light. Scleral icterus is present.   Neck: Normal range of motion. Neck supple. No JVD present.   Cardiovascular: Normal rate, regular rhythm, S1 normal, S2 normal and intact distal pulses.   No extrasystoles are present. Exam reveals no gallop.    No murmur heard.  Pulmonary/Chest: Effort normal and breath sounds normal. No respiratory distress. She has no wheezes.  She has no rales.   Abdominal: Soft. Bowel sounds are normal. She exhibits distension and ascites. There is no tenderness. There is no rebound, no guarding and no CVA tenderness.   Musculoskeletal: Normal range of motion. She exhibits no edema, tenderness or deformity.   Neurological: She is alert and oriented to person, place, and time. No cranial nerve deficit or sensory deficit. GCS eye subscore is 4. GCS verbal subscore is 5. GCS motor subscore is 6.   Skin: Skin is warm and dry. No rash noted. No erythema.   Psychiatric: She has a normal mood and affect. Judgment normal.   Nursing note and vitals reviewed.       Significant Labs:   Results for orders placed or performed during the hospital encounter of 11/20/17   CBC auto differential   Result Value Ref Range    WBC 12.62 3.90 - 12.70 K/uL    RBC 3.67 (L) 4.00 - 5.40 M/uL    Hemoglobin 10.7 (L) 12.0 - 16.0 g/dL    Hematocrit 28.9 (L) 37.0 - 48.5 %    MCV 79 (L) 82 - 98 fL    MCH 29.2 27.0 - 31.0 pg    MCHC 37.0 (H) 32.0 - 36.0 g/dL    RDW 24.5 (H) 11.5 - 14.5 %    Platelets 76 (L) 150 - 350 K/uL    MPV SEE COMMENT 9.2 - 12.9 fL    Gran # 10.7 (H) 1.8 - 7.7 K/uL    Lymph # 1.1 1.0 - 4.8 K/uL    Mono # 0.8 0.3 - 1.0 K/uL    Eos # 0.0 0.0 - 0.5 K/uL    Baso # 0.00 0.00 - 0.20 K/uL    Gran% 85.3 (H) 38.0 - 73.0 %    Lymph% 8.8 (L) 18.0 - 48.0 %    Mono% 6.1 4.0 - 15.0 %    Eosinophil% 0.2 0.0 - 8.0 %    Basophil% 0.0 0.0 - 1.9 %    Differential Method Automated    Comprehensive metabolic panel   Result Value Ref Range    Sodium 116 (LL) 136 - 145 mmol/L    Potassium 4.1 3.5 - 5.1 mmol/L    Chloride 89 (L) 95 - 110 mmol/L    CO2 18 (L) 23 - 29 mmol/L    Glucose 124 (H) 70 - 110 mg/dL    BUN, Bld 10 6 - 20 mg/dL    Creatinine 0.5 0.5 - 1.4 mg/dL    Calcium 8.7 8.7 - 10.5 mg/dL    Total Protein 6.7 6.0 - 8.4 g/dL    Albumin 2.4 (L) 3.5 - 5.2 g/dL    Total Bilirubin 23.5 (H) 0.1 - 1.0 mg/dL    Alkaline Phosphatase 319 (H) 55 - 135 U/L     (H) 10 - 40 U/L    ALT  593 (H) 10 - 44 U/L    Anion Gap 9 8 - 16 mmol/L    eGFR if African American >60 >60 mL/min/1.73 m^2    eGFR if non African American >60 >60 mL/min/1.73 m^2   Magnesium   Result Value Ref Range    Magnesium 1.8 1.6 - 2.6 mg/dL   Phosphorus   Result Value Ref Range    Phosphorus 2.4 (L) 2.7 - 4.5 mg/dL   Lipase   Result Value Ref Range    Lipase 40 4 - 60 U/L   APTT   Result Value Ref Range    aPTT 35.0 (H) 21.0 - 32.0 sec   Protime-INR   Result Value Ref Range    Prothrombin Time 20.4 (H) 9.0 - 12.5 sec    INR 2.0 (H) 0.8 - 1.2   Urinalysis Catheterized   Result Value Ref Range    Specimen UA Urine, Catheterized     Color, UA Peace Yellow, Straw, Peace    Appearance, UA Hazy (A) Clear    pH, UA SEE COMMENT 5.0 - 8.0    Specific Gravity, UA SEE COMMENT 1.005 - 1.030    Protein, UA SEE COMMENT Negative    Glucose, UA SEE COMMENT Negative    Ketones, UA SEE COMMENT Negative    Bilirubin (UA) SEE COMMENT Negative    Occult Blood UA SEE COMMENT Negative    Nitrite, UA SEE COMMENT Negative    Urobilinogen, UA SEE COMMENT <2.0 EU/dL    Leukocytes, UA SEE COMMENT Negative   Ammonia   Result Value Ref Range    Ammonia 47 10 - 50 umol/L   Urinalysis Microscopic   Result Value Ref Range    WBC, UA 5 0 - 5 /hpf    Microscopic Comment SEE COMMENT       All pertinent labs within the past 24 hours have been reviewed.    Significant Imaging:   Imaging Results          X-Ray Chest AP Portable (Final result)  Result time 11/20/17 17:55:44    Final result by Crystal Deshpande MD (11/20/17 17:55:44)                 Impression:     No acute cardiopulmonary disease.            Electronically signed by: CRYSTAL DESHPANDE MD  Date:     11/20/17  Time:    17:55              Narrative:    EXAM:   [id number] CHEST AP PORTABLE    CLINICAL HISTORY:  Chest Pain    COMPARISON: 10/19/2017    Findings:     The lungs are clear. The cardiac silhouette is within normal limits.                             I have reviewed all pertinent imaging  results/findings within the past 24 hours.     EKG: (personally reviewed)  Normal sinus rhythm; No acute changes from previous tracings.        Assessment/Plan:     * Acute on chronic hyponatremia    - Na 116, down from 120 on 11/15.  - Will admit to Telemetry.  - 0.9% NS @ 75 mL/hr with BMP Q8 hours.  - Will hold home diuretics.  - Fluid/free water restriction.  - Nephrology consult in AM.        Decompensated hepatic cirrhosis with end stage liver disease on transplant list    - Increasing MELD-Na score of 32 (score of 29 on 11/10/17).  On liver transplant list.  - Continue home lactulose.  Ammonia WNL at 47.  - LFT's continue to trend up at 484/593 (AST/ALT of 232/199 on 11/10/17).  - Consult Dr. Souza/GI in AM.  - Avoid hepatotoxic agents.        Alcoholic hepatitis with ascites    - Last paracentesis 11/9/17.  - Continue home Prednisone.  - GI consult in AM.        Moderate protein-calorie malnutrition    - Dietary consult.        Coagulopathy    - Secondary to ESLD.  - No evidence of active bleeding.  - INR/platelets stable.  Monitor.        Essential hypertension    - BP well controlled currently without antihypertensives.  - Will hold home BP meds to prevent hypotension.  Monitor BP trends, and resume as needed.        Anemia of chronic disease    - H&H stable.  Monitor.          VTE Risk Mitigation         Ordered     Medium Risk of VTE  Once      11/20/17 2304     Place sequential compression device  Until discontinued      11/20/17 2304            ODILIA Wisdom  Department of Hospital Medicine   [facility]

## 2017-11-21 NOTE — SUBJECTIVE & OBJECTIVE
Past Medical History:   Diagnosis Date    Alcohol abuse     Alcoholic hepatitis     Anemia of chronic disease     Coagulopathy     End stage liver disease     Hypertension     Hyponatremia     Liver cirrhosis     Liver transplant candidate        Past Surgical History:   Procedure Laterality Date    BREAST SURGERY      CHOLECYSTECTOMY      HYSTERECTOMY         Review of patient's allergies indicates:   Allergen Reactions    Ferrous sulfate Other (See Comments)     Patient states the pill makes her sick. She stated she would rather have a shot       Family History     Problem Relation (Age of Onset)    Depression Maternal Grandfather    Diabetes Father, Sister    Hypertension Mother, Father        Social History Main Topics    Smoking status: Never Smoker    Smokeless tobacco: Never Used    Alcohol use Yes      Comment: Currently admitted to inpatient rehab    Drug use: No    Sexual activity: Not Currently         Review of Systems   Constitutional: Positive for activity change, appetite change and fatigue.   Respiratory: Negative for chest tightness and shortness of breath.    Cardiovascular: Negative for chest pain.   Gastrointestinal: Positive for abdominal distention. Negative for abdominal pain, anal bleeding, blood in stool, constipation, diarrhea, nausea, rectal pain and vomiting.   Musculoskeletal: Positive for arthralgias.   Psychiatric/Behavioral: Negative for confusion.     Objective:     Vital Signs (Most Recent):  Temp: 98 °F (36.7 °C) (11/21/17 1200)  Pulse: 93 (11/21/17 1200)  Resp: 20 (11/21/17 1200)  BP: 122/70 (11/21/17 1200)  SpO2: 100 % (11/21/17 1200) Vital Signs (24h Range):  Temp:  [97.7 °F (36.5 °C)-98.4 °F (36.9 °C)] 98 °F (36.7 °C)  Pulse:  [83-95] 93  Resp:  [13-20] 20  SpO2:  [98 %-100 %] 100 %  BP: (102-124)/(51-70) 122/70     Weight: 71.2 kg (156 lb 15.5 oz)  Body mass index is 26.12 kg/m².      Intake/Output Summary (Last 24 hours) at 11/21/17 2377  Last data filed at  11/21/17 0800   Gross per 24 hour   Intake           956.25 ml   Output                0 ml   Net           956.25 ml       Physical Exam   Constitutional: She is oriented to person, place, and time.   Eyes: Scleral icterus is present.   Cardiovascular: Normal rate, regular rhythm and normal heart sounds.    No murmur heard.  Pulmonary/Chest: Effort normal and breath sounds normal. No respiratory distress. She has no wheezes.   Abdominal: Bowel sounds are normal. She exhibits distension. There is no tenderness.   Abdominal ascites    Neurological: She is alert and oriented to person, place, and time.   Psychiatric: She has a normal mood and affect. Her behavior is normal.       Vents:       Lines/Drains/Airways     Peripheral Intravenous Line                 Peripheral IV - Single Lumen 11/21/17 1104 Left Forearm less than 1 day                Significant Labs:    CBC/Anemia Profile:    Recent Labs  Lab 11/20/17  1724 11/21/17  0346   WBC 12.62 10.96   HGB 10.7* 10.6*   HCT 28.9* 28.9*   PLT 76* 59*   MCV 79* 79*   RDW 24.5* 24.5*        Chemistries:    Recent Labs  Lab 11/20/17  1724 11/21/17  0032 11/21/17  0346 11/21/17  0834   * 116* 117* 119*   K 4.1 3.6 3.6 3.4*   CL 89* 89* 90* 91*   CO2 18* 19* 20* 21*   BUN 10 10 9 9   CREATININE 0.5 0.6 0.6 0.6   CALCIUM 8.7 8.3* 8.1* 8.3*   ALBUMIN 2.4*  --  2.1*  --    PROT 6.7  --  5.6*  --    BILITOT 23.5*  --  20.0*  --    ALKPHOS 319*  --  284*  --    *  --  491*  --    *  --  376*  --    MG 1.8  --   --   --    PHOS 2.4*  --   --   --      MELD-Na score: 32 at 11/21/2017  8:34 AM  MELD score: 26 at 11/21/2017  8:34 AM  Calculated from:  Serum Creatinine: 0.6 mg/dL (Rounded to 1) at 11/21/2017  8:34 AM  Serum Sodium: 119 mmol/L (Rounded to 125) at 11/21/2017  8:34 AM  Total Bilirubin: 20.0 mg/dL at 11/21/2017  3:46 AM  INR(ratio): 2.0 at 11/21/2017  3:46 AM  Age: 52 years      Significant Imaging:   I have reviewed and interpreted all pertinent  imaging results/findings within the past 24 hours.

## 2017-11-21 NOTE — PLAN OF CARE
Problem: Patient Care Overview  Goal: Plan of Care Review  Outcome: Ongoing (interventions implemented as appropriate)  Recommendations     Recommendation/Intervention: 1.Consider SLP evaluation due to swallowing issues. 2.Consider adding oral supplements with meals~ boost plus TID. 3.  Discussed importance of adequate intake and sources of healthy calories to increase overall calorie intake. 4. Will continue to encourage PO intake.   Goals: PO intake greater than 50 % by next RD visit  Nutrition Goal Status: new  Communication of RD Recs:  (care plan and sticky note)

## 2017-11-21 NOTE — ASSESSMENT & PLAN NOTE
- Increasing MELD-Na score of 32 (score of 29 on 11/10/17).  On liver transplant list.  - Continue home lactulose.  Ammonia WNL at 47.  - LFT's continue to trend up at 484/593 (AST/ALT of 232/199 on 11/10/17).  - Consult Dr. Souza/GI in AM.  - Avoid hepatotoxic agents.

## 2017-11-21 NOTE — CONSULTS
"[facility]  Pulmonology  Consult Note    Patient Name: Marcie Bazan  MRN: [id number]  Admission Date: 11/20/2017  Hospital Length of Stay: 1 days  Code Status: Full Code  Attending Physician: Varsha Case MD  Primary Care Provider: ODILIA Wall   Principal Problem: Chronic hyponatremia      Subjective:     HPI:   Patient is a 53 yo  female with a history of decompensated alcoholic liver cirrhosis. She was transferred to the ED from inpatient rehab facility for worsening fatigue, malaise, lethargy, generalized weakness, and decreased appetite over the past 1 week. She is well known to the liver clinic and has been followed closely by Dr. Souza. She was recently discharged from [facility] for hyponatremia and worsening liver disease. She was previously a candidate for liver transplant but was denied secondary to alcohol abuse. She has been in inpatient rehab since last hospitalization in hopes to become a candidate for liver transplant again. She is currently in ICU and reports being extremely fatigued and weak. She denies any recent medication changes. She is afebrile. No overt confusion. Upon questioning, she reports some "dark" stools but was not able to tell me if this was new or has been going on. She denies any SOB at rest. No worsening ascites. Labs in ER H/H 10.7/28.9, Platelets 59, Sodium 116, Alk phos 284, Bili 23.5, INR 2.0 AST//593, MELD 32.       Past Medical History:   Diagnosis Date    Alcohol abuse      Alcoholic hepatitis      Anemia of chronic disease      Coagulopathy      End stage liver disease      Hypertension      Hyponatremia      Liver cirrhosis      Liver transplant candidate                 Past Surgical History:   Procedure Laterality Date    BREAST SURGERY        CHOLECYSTECTOMY        HYSTERECTOMY                   Review of patient's allergies indicates:   Allergen Reactions    Ferrous sulfate Other (See Comments) "       Patient states the pill makes her sick. She stated she would rather have a shot              Family History      Problem Relation (Age of Onset)     Depression Maternal Grandfather     Diabetes Father, Sister     Hypertension Mother, Father                 Social History Main Topics    Smoking status: Never Smoker    Smokeless tobacco: Never Used    Alcohol use Yes         Comment: Currently admitted to inpatient rehab    Drug use: No    Sexual activity: Not Currently          Review of Systems   Constitutional: Positive for activity change, appetite change and fatigue.   Respiratory: Negative for chest tightness and shortness of breath.    Cardiovascular: Negative for chest pain.   Gastrointestinal: Positive for abdominal distention. Negative for abdominal pain, anal bleeding, blood in stool, constipation, diarrhea, nausea, rectal pain and vomiting.   Musculoskeletal: Positive for arthralgias.   Psychiatric/Behavioral: Negative for confusion.      Objective:      Vital Signs (Most Recent):  Temp: 98 °F (36.7 °C) (11/21/17 1200)  Pulse: 93 (11/21/17 1200)  Resp: 20 (11/21/17 1200)  BP: 122/70 (11/21/17 1200)  SpO2: 100 % (11/21/17 1200) Vital Signs (24h Range):  Temp:  [97.7 °F (36.5 °C)-98.4 °F (36.9 °C)] 98 °F (36.7 °C)  Pulse:  [83-95] 93  Resp:  [13-20] 20  SpO2:  [98 %-100 %] 100 %  BP: (102-124)/(51-70) 122/70      Weight: 71.2 kg (156 lb 15.5 oz)  Body mass index is 26.12 kg/m².        Intake/Output Summary (Last 24 hours) at 11/21/17 1405  Last data filed at 11/21/17 0800    Gross per 24 hour   Intake           956.25 ml   Output                0 ml   Net           956.25 ml         Physical Exam   Constitutional: She is oriented to person, place, and time.   Eyes: Scleral icterus is present.   Cardiovascular: Normal rate, regular rhythm and normal heart sounds.    No murmur heard.  Pulmonary/Chest: Effort normal and breath sounds normal. No respiratory distress. She has no wheezes.   Abdominal:  Bowel sounds are normal. She exhibits distension. There is no tenderness.   Abdominal ascites    Neurological: She is alert and oriented to person, place, and time.   Psychiatric: She has a normal mood and affect. Her behavior is normal.         Vents:            Lines/Drains/Airways            Peripheral Intravenous Line                          Peripheral IV - Single Lumen 11/21/17 1104 Left Forearm less than 1 day                      Significant Labs:     CBC/Anemia Profile:     Recent Labs  Lab 11/20/17  1724 11/21/17  0346   WBC 12.62 10.96   HGB 10.7* 10.6*   HCT 28.9* 28.9*   PLT 76* 59*   MCV 79* 79*   RDW 24.5* 24.5*         Chemistries:     Recent Labs  Lab 11/20/17  1724 11/21/17  0032 11/21/17  0346 11/21/17  0834   * 116* 117* 119*   K 4.1 3.6 3.6 3.4*   CL 89* 89* 90* 91*   CO2 18* 19* 20* 21*   BUN 10 10 9 9   CREATININE 0.5 0.6 0.6 0.6   CALCIUM 8.7 8.3* 8.1* 8.3*   ALBUMIN 2.4*  --  2.1*  --    PROT 6.7  --  5.6*  --    BILITOT 23.5*  --  20.0*  --    ALKPHOS 319*  --  284*  --    *  --  491*  --    *  --  376*  --    MG 1.8  --   --   --    PHOS 2.4*  --   --   --       MELD-Na score: 32 at 11/21/2017  8:34 AM  MELD score: 26 at 11/21/2017  8:34 AM  Calculated from:  Serum Creatinine: 0.6 mg/dL (Rounded to 1) at 11/21/2017  8:34 AM  Serum Sodium: 119 mmol/L (Rounded to 125) at 11/21/2017  8:34 AM  Total Bilirubin: 20.0 mg/dL at 11/21/2017  3:46 AM  INR(ratio): 2.0 at 11/21/2017  3:46 AM  Age: 52 years        Significant Imaging:   I have reviewed and interpreted all pertinent imaging results/findings within the past 24 hours.    Assessment/Plan:       Alcoholic hepatitis with ascites- decompensated with elevated MELD     - Admitted to ICU for hyponatremia Na improving to 119. This is not far from her baseline over the last month. Goal >125  - Not currently on liver transplant list secondary to alcohol abuse.   - Diuretics on hold  - 1.5L fluid restriction  - Ascites present but  per patient is not increased from baseline. No SOB at rest  - No overt confusion  - Will discuss case with Dr. Souza for input. Prognosis guarded.   - Continue supportive care per ICU team.              Acute on chronic hyponatremia     Likely secondary to liver disease. Nephrology following       Coagulopathy     Secondary to alcoholic cirrhosis. Plan as above.        Anemia of chronic disease     Stable. Continue to monitor H/H  Subjective complaint of dark stool. Question as to whether this is melena. Monitor H/H. Will consider EGD if melanic stool witnessed by ICU staff or decrease in H/H.              Thank you for your consult. I will follow-up with patient. Please contact us if you have any additional questions.           Thank you for your consult. I will follow-up with patient. Please contact us if you have any additional questions.     Yaquelin Restrepo NP  Gastroenterology   [facility]

## 2017-11-21 NOTE — SUBJECTIVE & OBJECTIVE
Past Medical History:   Diagnosis Date    Alcohol abuse     Alcoholic hepatitis     Anemia of chronic disease     Coagulopathy     End stage liver disease     Hypertension     Hyponatremia     Liver cirrhosis     Liver transplant candidate        Past Surgical History:   Procedure Laterality Date    BREAST SURGERY      CHOLECYSTECTOMY      HYSTERECTOMY         Review of patient's allergies indicates:   Allergen Reactions    Ferrous sulfate Other (See Comments)     Patient states the pill makes her sick. She stated she would rather have a shot     Current Facility-Administered Medications   Medication Frequency    cetirizine tablet 10 mg Daily    lactulose 20 gram/30 mL solution Soln 30 g TID    ondansetron disintegrating tablet 4 mg Q8H PRN    pantoprazole EC tablet 40 mg Daily    predniSONE tablet 40 mg Daily    ramelteon tablet 8 mg Nightly PRN     Family History     Problem Relation (Age of Onset)    Depression Maternal Grandfather    Diabetes Father, Sister    Hypertension Mother, Father        Social History Main Topics    Smoking status: Never Smoker    Smokeless tobacco: Never Used    Alcohol use Yes      Comment: Currently admitted to inpatient rehab    Drug use: No    Sexual activity: Not Currently     Review of Systems   Constitutional: Positive for fatigue. Negative for fever.        Patient reports generalized weakness.   HENT: Negative for congestion.    Eyes: Negative for visual disturbance.   Respiratory: Negative for cough, shortness of breath and wheezing.    Cardiovascular: Negative for chest pain and palpitations.   Gastrointestinal: Negative for abdominal pain, diarrhea, nausea and vomiting.   Genitourinary: Positive for dysuria. Negative for difficulty urinating.   Musculoskeletal: Negative for joint swelling.   Skin: Negative for rash.   Neurological: Negative for weakness and headaches.     Objective:     Vital Signs (Most Recent):  Temp: 98.4 °F (36.9 °C) (11/21/17  0300)  Pulse: 87 (11/21/17 0300)  Resp: 15 (11/21/17 0300)  BP: (!) 112/54 (11/21/17 0300)  SpO2: 99 % (11/21/17 0300)  O2 Device (Oxygen Therapy): room air (11/21/17 0300) Vital Signs (24h Range):  Temp:  [97.7 °F (36.5 °C)-98.4 °F (36.9 °C)] 98.4 °F (36.9 °C)  Pulse:  [83-95] 87  Resp:  [13-20] 15  SpO2:  [98 %-100 %] 99 %  BP: (102-124)/(51-70) 112/54     Weight: 71.2 kg (156 lb 15.5 oz) (11/20/17 2312)  Body mass index is 26.12 kg/m².  Body surface area is 1.81 meters squared.    I/O last 3 completed shifts:  In: 806.3 [P.O.:150; I.V.:656.3]  Out: -     Physical Exam   Constitutional: She is oriented to person, place, and time. She appears well-developed and well-nourished.   HENT:   Head: Normocephalic.   Eyes: Pupils are equal, round, and reactive to light. Scleral icterus is present.   Neck: No thyromegaly present.   Cardiovascular: Normal rate and regular rhythm.  Exam reveals no friction rub.    Pulmonary/Chest: Effort normal and breath sounds normal. She has no wheezes. She exhibits no tenderness.   Abdominal: Soft. Bowel sounds are normal. She exhibits distension. There is no tenderness.   Musculoskeletal: She exhibits edema.   Trace LE edema.   Lymphadenopathy:     She has no cervical adenopathy.   Neurological: She is alert and oriented to person, place, and time.   Skin: Skin is warm and dry. No rash noted.   Psychiatric: She has a normal mood and affect.       Significant Labs:  Lab Results   Component Value Date    CREATININE 0.6 11/21/2017    BUN 9 11/21/2017     (LL) 11/21/2017    K 3.6 11/21/2017    CL 90 (L) 11/21/2017    CO2 20 (L) 11/21/2017     Lab Results   Component Value Date    CALCIUM 8.1 (L) 11/21/2017    PHOS 2.4 (L) 11/20/2017     Lab Results   Component Value Date    ALBUMIN 2.1 (L) 11/21/2017     Lab Results   Component Value Date    WBC 10.96 11/21/2017    HGB 10.6 (L) 11/21/2017    HCT 28.9 (L) 11/21/2017    MCV 79 (L) 11/21/2017    PLT 59 (L) 11/21/2017         Significant  Imaging:  Imaging Results          X-Ray Chest AP Portable (Final result)  Result time 11/20/17 17:55:44    Final result by Crystal Deshpande MD (11/20/17 17:55:44)                 Impression:     No acute cardiopulmonary disease.            Electronically signed by: CRYSTAL DESHPANDE MD  Date:     11/20/17  Time:    17:55              Narrative:    EXAM:   [id number] CHEST AP PORTABLE    CLINICAL HISTORY:  Chest Pain    COMPARISON: 10/19/2017    Findings:     The lungs are clear. The cardiac silhouette is within normal limits.

## 2017-11-21 NOTE — CONSULTS
"Discussed case with Dr. Souza.   She is not a liver transplant candidate at this time due to alcohol abuse.   Last admission CMV IgM elevated at 65.1. Will get CMV PCR  EBV negative IgM  All diuretics held at this time and should NOT be restarted at discharge  Fluid restriction.  Will monitor H/H at this time for subjective "dark stools"  Currently on prednisone. Low threshold for paracentesis secondary to immune suppression. Paracentesis as needed for therapeutic purposes. If needed, will get with diagnostics.     "

## 2017-11-22 ENCOUNTER — CONFERENCE (OUTPATIENT)
Dept: TRANSPLANT | Facility: CLINIC | Age: 52
End: 2017-11-22

## 2017-11-22 ENCOUNTER — HOSPITAL ENCOUNTER (OUTPATIENT)
Dept: RADIOLOGY | Facility: HOSPITAL | Age: 52
Discharge: HOME | End: 2017-11-22
Attending: INTERNAL MEDICINE

## 2017-11-22 ENCOUNTER — TELEPHONE (OUTPATIENT)
Dept: GASTROENTEROLOGY | Facility: CLINIC | Age: 52
End: 2017-11-22

## 2017-11-22 LAB
ALBUMIN FLD-MCNC: 0.5 G/DL
ALBUMIN SERPL BCP-MCNC: 2.1 G/DL
ALP SERPL-CCNC: 308 U/L
ALT SERPL W/O P-5'-P-CCNC: 482 U/L
ANION GAP SERPL CALC-SCNC: 10 MMOL/L
ANION GAP SERPL CALC-SCNC: 8 MMOL/L
ANION GAP SERPL CALC-SCNC: 8 MMOL/L
ANION GAP SERPL CALC-SCNC: 9 MMOL/L
APPEARANCE FLD: CLEAR
AST SERPL-CCNC: 354 U/L
BASOPHILS # BLD AUTO: 0.01 K/UL
BASOPHILS NFR BLD: 0.1 %
BILIRUB SERPL-MCNC: 21.1 MG/DL
BODY FLD TYPE: NORMAL
BUN SERPL-MCNC: 10 MG/DL
BUN SERPL-MCNC: 9 MG/DL
CALCIUM SERPL-MCNC: 8.1 MG/DL
CALCIUM SERPL-MCNC: 8.1 MG/DL
CALCIUM SERPL-MCNC: 8.2 MG/DL
CALCIUM SERPL-MCNC: 8.2 MG/DL
CHLORIDE SERPL-SCNC: 90 MMOL/L
CHLORIDE SERPL-SCNC: 90 MMOL/L
CHLORIDE SERPL-SCNC: 91 MMOL/L
CHLORIDE SERPL-SCNC: 92 MMOL/L
CO2 SERPL-SCNC: 18 MMOL/L
CO2 SERPL-SCNC: 21 MMOL/L
CO2 SERPL-SCNC: 21 MMOL/L
CO2 SERPL-SCNC: 22 MMOL/L
COLOR FLD: YELLOW
CREAT SERPL-MCNC: 0.6 MG/DL
DACRYOCYTES BLD QL SMEAR: ABNORMAL
DIFFERENTIAL METHOD: ABNORMAL
EOSINOPHIL # BLD AUTO: 0.1 K/UL
EOSINOPHIL NFR BLD: 0.5 %
EOSINOPHIL NFR FLD MANUAL: 4 %
ERYTHROCYTE [DISTWIDTH] IN BLOOD BY AUTOMATED COUNT: 24.9 %
EST. GFR  (AFRICAN AMERICAN): >60 ML/MIN/1.73 M^2
EST. GFR  (NON AFRICAN AMERICAN): >60 ML/MIN/1.73 M^2
GLUCOSE SERPL-MCNC: 106 MG/DL
GLUCOSE SERPL-MCNC: 107 MG/DL
GLUCOSE SERPL-MCNC: 132 MG/DL
GLUCOSE SERPL-MCNC: 139 MG/DL
HCT VFR BLD AUTO: 29.7 %
HGB BLD-MCNC: 10.9 G/DL
INR PPP: 2.1
LYMPHOCYTES # BLD AUTO: 2.5 K/UL
LYMPHOCYTES NFR BLD: 20.9 %
LYMPHOCYTES NFR FLD MANUAL: 22 %
MCH RBC QN AUTO: 29.4 PG
MCHC RBC AUTO-ENTMCNC: 36.7 G/DL
MCV RBC AUTO: 80 FL
MONOCYTES # BLD AUTO: 1 K/UL
MONOCYTES NFR BLD: 8.6 %
MONOS+MACROS NFR FLD MANUAL: 45 %
NEUTROPHILS # BLD AUTO: 8.3 K/UL
NEUTROPHILS NFR BLD: 70.3 %
NEUTROPHILS NFR FLD MANUAL: 29 %
PATH INTERP FLD-IMP: NORMAL
PLATELET # BLD AUTO: 78 K/UL
PMV BLD AUTO: ABNORMAL FL
POIKILOCYTOSIS BLD QL SMEAR: ABNORMAL
POTASSIUM SERPL-SCNC: 3.3 MMOL/L
POTASSIUM SERPL-SCNC: 3.4 MMOL/L
POTASSIUM SERPL-SCNC: 3.5 MMOL/L
POTASSIUM SERPL-SCNC: 3.5 MMOL/L
PROT SERPL-MCNC: 5.8 G/DL
PROTHROMBIN TIME: 20.9 SEC
RBC # BLD AUTO: 3.71 M/UL
SODIUM SERPL-SCNC: 120 MMOL/L
SPECIMEN SOURCE: NORMAL
TARGETS BLD QL SMEAR: ABNORMAL
WBC # BLD AUTO: 11.9 K/UL
WBC # FLD: 78 /CU MM

## 2017-11-22 PROCEDURE — 87075 CULTR BACTERIA EXCEPT BLOOD: CPT

## 2017-11-22 PROCEDURE — 87205 SMEAR GRAM STAIN: CPT

## 2017-11-22 PROCEDURE — 21400001 HC TELEMETRY ROOM

## 2017-11-22 PROCEDURE — 87070 CULTURE OTHR SPECIMN AEROBIC: CPT

## 2017-11-22 PROCEDURE — 63600175 PHARM REV CODE 636 W HCPCS: Performed by: NURSE PRACTITIONER

## 2017-11-22 PROCEDURE — 85610 PROTHROMBIN TIME: CPT

## 2017-11-22 PROCEDURE — 80053 COMPREHEN METABOLIC PANEL: CPT

## 2017-11-22 PROCEDURE — 87040 BLOOD CULTURE FOR BACTERIA: CPT

## 2017-11-22 PROCEDURE — 80048 BASIC METABOLIC PNL TOTAL CA: CPT

## 2017-11-22 PROCEDURE — 99232 SBSQ HOSP IP/OBS MODERATE 35: CPT | Mod: ,,, | Performed by: INTERNAL MEDICINE

## 2017-11-22 PROCEDURE — 89051 BODY FLUID CELL COUNT: CPT

## 2017-11-22 PROCEDURE — 25000003 PHARM REV CODE 250: Performed by: NURSE PRACTITIONER

## 2017-11-22 PROCEDURE — 85025 COMPLETE CBC W/AUTO DIFF WBC: CPT

## 2017-11-22 PROCEDURE — 80048 BASIC METABOLIC PNL TOTAL CA: CPT | Mod: 91

## 2017-11-22 PROCEDURE — 36415 COLL VENOUS BLD VENIPUNCTURE: CPT

## 2017-11-22 PROCEDURE — 0W9G3ZX DRAINAGE OF PERITONEAL CAVITY, PERCUTANEOUS APPROACH, DIAGNOSTIC: ICD-10-PCS | Performed by: RADIOLOGY

## 2017-11-22 PROCEDURE — 0W9G3ZZ DRAINAGE OF PERITONEAL CAVITY, PERCUTANEOUS APPROACH: ICD-10-PCS | Performed by: RADIOLOGY

## 2017-11-22 PROCEDURE — 82042 OTHER SOURCE ALBUMIN QUAN EA: CPT

## 2017-11-22 RX ORDER — PREDNISONE 20 MG/1
20 TABLET ORAL DAILY
Status: DISCONTINUED | OUTPATIENT
Start: 2017-11-23 | End: 2017-11-23 | Stop reason: HOSPADM

## 2017-11-22 RX ADMIN — PREDNISONE 40 MG: 20 TABLET ORAL at 08:11

## 2017-11-22 RX ADMIN — LACTULOSE 30 G: 10 SOLUTION ORAL at 01:11

## 2017-11-22 RX ADMIN — LACTULOSE 30 G: 10 SOLUTION ORAL at 09:11

## 2017-11-22 RX ADMIN — CETIRIZINE HYDROCHLORIDE 10 MG: 10 TABLET, FILM COATED ORAL at 08:11

## 2017-11-22 RX ADMIN — PANTOPRAZOLE SODIUM 40 MG: 40 TABLET, DELAYED RELEASE ORAL at 08:11

## 2017-11-22 NOTE — SUBJECTIVE & OBJECTIVE
Interval History:     11/22/17: Patient reports being fatigued.         Review of patient's allergies indicates:   Allergen Reactions    Ferrous sulfate Other (See Comments)     Patient states the pill makes her sick. She stated she would rather have a shot     Current Facility-Administered Medications   Medication Frequency    cetirizine tablet 10 mg Daily    lactulose 20 gram/30 mL solution Soln 30 g TID    ondansetron disintegrating tablet 4 mg Q8H PRN    pantoprazole EC tablet 40 mg Daily    potassium chloride SA CR tablet 40 mEq Once    predniSONE tablet 40 mg Daily    ramelteon tablet 8 mg Nightly PRN       Objective:     Vital Signs (Most Recent):  Temp: 97.3 °F (36.3 °C) (11/22/17 1308)  Pulse: 86 (11/22/17 1308)  Resp: 18 (11/22/17 1308)  BP: (!) 115/59 (11/22/17 1308)  SpO2: 100 % (11/22/17 1308)  O2 Device (Oxygen Therapy): room air (11/22/17 1308) Vital Signs (24h Range):  Temp:  [97.3 °F (36.3 °C)-98.4 °F (36.9 °C)] 97.3 °F (36.3 °C)  Pulse:  [] 86  Resp:  [15-21] 18  SpO2:  [98 %-100 %] 100 %  BP: (109-124)/(43-66) 115/59     Weight: 72 kg (158 lb 11.7 oz) (11/22/17 0600)  Body mass index is 26.41 kg/m².  Body surface area is 1.82 meters squared.    I/O last 3 completed shifts:  In: 956.3 [P.O.:150; I.V.:806.3]  Out: 250 [Urine:250]    Physical Exam   Constitutional: She is oriented to person, place, and time. She appears well-developed and well-nourished.   HENT:   Head: Normocephalic.   Eyes: Pupils are equal, round, and reactive to light. Scleral icterus is present.   Neck: No thyromegaly present.   Cardiovascular: Normal rate and regular rhythm.  Exam reveals no friction rub.    Pulmonary/Chest: Effort normal and breath sounds normal. She has no wheezes. She exhibits no tenderness.   Abdominal: Soft. Bowel sounds are normal. She exhibits distension. There is no tenderness.   Musculoskeletal: She exhibits edema.   Trace LE edema.   Lymphadenopathy:     She has no cervical adenopathy.    Neurological: She is alert and oriented to person, place, and time.   Skin: Skin is warm and dry. No rash noted.   Psychiatric: She has a normal mood and affect.       Significant Labs:  Lab Results   Component Value Date    CREATININE 0.6 11/22/2017    BUN 9 11/22/2017     (L) 11/22/2017    K 3.4 (L) 11/22/2017    CL 90 (L) 11/22/2017    CO2 22 (L) 11/22/2017     Lab Results   Component Value Date    CALCIUM 8.1 (L) 11/22/2017    PHOS 2.4 (L) 11/20/2017     Lab Results   Component Value Date    ALBUMIN 2.1 (L) 11/22/2017     Lab Results   Component Value Date    WBC 11.90 11/22/2017    HGB 10.9 (L) 11/22/2017    HCT 29.7 (L) 11/22/2017    MCV 80 (L) 11/22/2017    PLT 78 (L) 11/22/2017       Recent Labs  Lab 11/20/17  1724   MG 1.8         Significant Imaging:  Imaging Results          US Guided Paracentesis (Final result)  Result time 11/22/17 13:04:11    Final result by Romeo Guillory MD (11/22/17 13:04:11)                 Impression:     Successful paracentesis as above.      Electronically signed by: ROMEO GUILLORY MD  Date:     11/22/17  Time:    13:04              Narrative:    Procedure: Paracentesis    History: Ascites    Radiologist: Laurita Guillory    Procedure codes: 73563, 16968    Findings: Informed consent was obtained prior to the exam after discussion of risk and benefits of the procedure.  No sedation was administered.  Ultrasound demonstrated a moderate collection within the right lower quadrant.  The skin was prepped and draped under sterile conditions and 1% lidocaine was used for local anesthesia.  A NXVISION centesis needle was used to access the fluid collection.  There is return of 2 L of fluid which was serous.  The catheter was subsequently removed and hemostasis was achieved.  The patient tolerated the procedure.                             US Abdomen Limited with Doppler (xpd) (Final result)  Result time 11/22/17 12:00:09    Final result by Crystal Deshpande MD (11/22/17  12:00:09)                 Impression:        Nodular liver contour compatible with cirrhosis.    Moderate ascites.    Patent portal vein.    Cholecystectomy.      Electronically signed by: CRYSTAL DESHPANDE MD  Date:     11/22/17  Time:    12:00              Narrative:    Limited abdominal ultrasound    History: Mass.  Portal vein thrombosis.    Comparison: 11/15/2017    Findings:    Cholecystectomy.  Common bile duct measures 4 mm.  Liver measures 14.8 cm and demonstrates nodular contour compatible with cirrhosis.  No focal hepatic masses. There is moderate ascites.  Portal vein is patent with hepatopedal flow.                             X-Ray Chest AP Portable (Final result)  Result time 11/20/17 17:55:44    Final result by Crystal Deshpande MD (11/20/17 17:55:44)                 Impression:     No acute cardiopulmonary disease.            Electronically signed by: CRYSTAL DESHPANDE MD  Date:     11/20/17  Time:    17:55              Narrative:    EXAM:   [id number] CHEST AP PORTABLE    CLINICAL HISTORY:  Chest Pain    COMPARISON: 10/19/2017    Findings:     The lungs are clear. The cardiac silhouette is within normal limits.

## 2017-11-22 NOTE — TELEPHONE ENCOUNTER
Spoke with nurse. Patient is still in hospital but attending MD at [facility] will be calling REINALDO Hollins.

## 2017-11-22 NOTE — PLAN OF CARE
Problem: Patient Care Overview  Goal: Plan of Care Review  Outcome: Ongoing (interventions implemented as appropriate)  Pt remains free from injury/falls. Fall precautions in place. Bed alarm intact and functioning when pt is in bed. Pt tolerating regular diet with 1,000 ml fluid rest. IV ABX given as ordered. Denies pain and nausea at present time. Pt to have u/s abd limited with doppler and paracentesis today. Pt able to verbalize needs/wants. Bed in low, locked position, call light and personal items within reach. VSS. Will cont to monitor.

## 2017-11-22 NOTE — PROGRESS NOTES
[facility] Hospital Medicine  Progress Note    Patient Name: Marcie Bazan  MRN: [id number]  Patient Class: IP- Inpatient   Admission Date: 11/20/2017  Length of Stay: 2 days  Attending Physician: Varsha Case MD  Primary Care Provider: ODILIA Wall    Subjective:     Principal Problem:Chronic hyponatremia    HPI:  Ms. Bazan is a 53 yo female with a PMHx of alcoholic hepatitis and cirrhosis with ESLD (progressively worsening MELD-Na score of 32) on liver transplant list, who was sent from inpatient rehab to ED for c/o fatigue, malaise, lethargy, generalized weakness, and decreased appetite over the past 1 week.  She was recently discharged from [facility] 11/15 after a 5 day hospitalization for hyponatremia and worsening liver disease.  She was discharged to [facility] for inpatient EtOH rehab, where she has steadily declined.  Initial work-up in ED resulted Na 116, , , ammonia 47, total bili 23.5, INR 2, H&H 10.7/29, plt 76; VSS.   CXR and EKG unremarkable.  Hospital Medicine was consulted for admission.  Currently, patient is drowsy but easily aroused.  Denies any lightheadedness/dizziness, syncope, AMS, visual disturbances, focal deficits, numbness/tingling, HA, seizure-like activity, chest pain, palpitations, SOB, cough, orthopnea, PND, edema, weight gain, ABD pain, N/V/D, dysuria, hematuria, fever, or chills.   She is followed closely by Dr. Souza for her ESLD.    Hospital Course:  52 year old AAF with alcoholic hepatitis admitted for chronic hyponatremia. ED labs revealed Na 116, , , ammonia 47, total bili 23.5, INR 2, H&H 10.7/29, plt 76; VSS. CXR and EKG unremarkable. Nephrology and GI consulted. Patient was recently admitted for hyponatremia and worsening liver disease. Sodium goal is 125. Diuretics held and fluid restriction initiated. MELD score 32. Plans for diagnostic and therapeutic paracentesis to rule out SBP.      Interval History:Sodium 120 today. Nephrology considering Samsca. Paracentesis today with fluid analysis to rule out SBP.     Review of Systems   Constitutional: Positive for appetite change (decreased) and fatigue. Negative for chills, diaphoresis, fever and unexpected weight change.        Malaise   HENT: Negative for congestion, rhinorrhea, sinus pressure, sore throat and trouble swallowing.    Eyes: Negative for photophobia and visual disturbance.   Respiratory: Negative for apnea, cough, chest tightness, shortness of breath and wheezing.    Cardiovascular: Negative for chest pain, palpitations and leg swelling.   Gastrointestinal: Positive for abdominal distention. Negative for abdominal pain, blood in stool, constipation, diarrhea, nausea and vomiting.   Endocrine: Negative for polydipsia, polyphagia and polyuria.   Genitourinary: Negative for difficulty urinating, dysuria, frequency, hematuria and urgency.   Musculoskeletal: Positive for arthralgias. Negative for back pain, gait problem, joint swelling and myalgias.   Skin: Negative for pallor, rash and wound.   Neurological: Positive for weakness (generalized). Negative for dizziness, tremors, seizures, syncope, speech difficulty, light-headedness, numbness and headaches.   Psychiatric/Behavioral: Negative for agitation, confusion, hallucinations and sleep disturbance. The patient is not nervous/anxious.    All other systems reviewed and are negative.     Objective:     Vital Signs (Most Recent):  Temp: 97.8 °F (36.6 °C) (11/22/17 0820)  Pulse: 87 (11/22/17 1220)  Resp: 16 (11/22/17 1220)  BP: (!) 115/58 (11/22/17 1220)  SpO2: 99 % (11/22/17 1220) Vital Signs (24h Range):  Temp:  [97.4 °F (36.3 °C)-98.4 °F (36.9 °C)] 97.8 °F (36.6 °C)  Pulse:  [] 87  Resp:  [15-21] 16  SpO2:  [98 %-100 %] 99 %  BP: (109-124)/(43-66) 115/58     Weight: 72 kg (158 lb 11.7 oz)  Body mass index is 26.41 kg/m².    Intake/Output Summary (Last 24 hours) at 11/22/17  1237  Last data filed at 11/22/17 0900   Gross per 24 hour   Intake              120 ml   Output              250 ml   Net             -130 ml      Physical Exam   Constitutional: She is oriented to person, place, and time. She appears well-developed. She has a sickly appearance.   HENT:   Head: Normocephalic.   Eyes: Pupils are equal, round, and reactive to light. Scleral icterus is present.   Neck: No thyromegaly present.   Cardiovascular: Normal rate and regular rhythm.  Exam reveals no friction rub.    Pulmonary/Chest: Effort normal and breath sounds normal. She has no wheezes. She exhibits no tenderness.   Abdominal: Soft. Bowel sounds are normal. She exhibits distension and ascites. There is no tenderness.   Musculoskeletal: She exhibits edema.   Trace LE edema.   Lymphadenopathy:     She has no cervical adenopathy.   Neurological: She is alert and oriented to person, place, and time.   Skin: Skin is warm and dry. No rash noted.   Psychiatric: She has a normal mood and affect.     Significant Labs:   CBC:   Recent Labs  Lab 11/20/17  1724 11/21/17  0346 11/22/17  0555   WBC 12.62 10.96 11.90   HGB 10.7* 10.6* 10.9*   HCT 28.9* 28.9* 29.7*   PLT 76* 59* 78*       Significant Imaging:   Imaging Results          US Abdomen Limited with Doppler (xpd) (Final result)  Result time 11/22/17 12:00:09    Final result by Crystal Deshpande MD (11/22/17 12:00:09)                 Impression:        Nodular liver contour compatible with cirrhosis.    Moderate ascites.    Patent portal vein.    Cholecystectomy.      Electronically signed by: CRYSTAL DESHPANDE MD  Date:     11/22/17  Time:    12:00              Narrative:    Limited abdominal ultrasound    History: Mass.  Portal vein thrombosis.    Comparison: 11/15/2017    Findings:    Cholecystectomy.  Common bile duct measures 4 mm.  Liver measures 14.8 cm and demonstrates nodular contour compatible with cirrhosis.  No focal hepatic masses. There is moderate  ascites.  Portal vein is patent with hepatopedal flow.                             X-Ray Chest AP Portable (Final result)  Result time 11/20/17 17:55:44    Final result by Crystal Deshpande MD (11/20/17 17:55:44)                 Impression:     No acute cardiopulmonary disease.            Electronically signed by: CRYSTAL DESHPANDE MD  Date:     11/20/17  Time:    17:55              Narrative:    EXAM:   [id number] CHEST AP PORTABLE    CLINICAL HISTORY:  Chest Pain    COMPARISON: 10/19/2017    Findings:     The lungs are clear. The cardiac silhouette is within normal limits.                                Assessment/Plan:      * Acute on chronic hyponatremia    -related to decompensated cirrhosis  Na 120 today.   -Continue to hold diuretics  - Fluid/free water restriction.  -Nephrology will consider Samsca.   -GI following.        Alcoholic hepatitis with ascites- decompensated with elevated MELD    -Plans for paracentesis today with fluid analysis to rule out SBP  -patient is no longer a candidate for liver transplant due to recent alcohol abuse  -GI following  -Continue prednisone        End stage liver disease    -GI following  -Plan above  -given worsening transaminitis will rule out SBP.  - Avoid hepatotoxic agents.        Moderate protein-calorie malnutrition    -offer supplements        Hypokalemia    -replete          Coagulopathy    - Secondary to ESLD.  - No evidence of active bleeding.  - INR/platelets stable.  Monitor.        Essential hypertension    - BP well controlled currently without antihypertensives.  - Will hold home BP meds to prevent hypotension.  Monitor BP trends, and resume as needed.        Anemia of chronic disease    - H&H stable.  Monitor.          VTE Risk Mitigation         Ordered     Medium Risk of VTE  Once      11/20/17 2304     Place sequential compression device  Until discontinued      11/20/17 2304          Grace Carrasquillo NP  Department of Hospital Medicine    [facility]

## 2017-11-22 NOTE — OP NOTE
Sterile technique was performed in the RLQ, lidocaine was used as a local anesthetic.  2 liters of yellow fluid removed for therapeutic and diagnostic purposes.  Pt tolerated the procedure well without immediate complications.  Please see radiologist report for details. F/u with PCP and/or ordering physician.

## 2017-11-22 NOTE — PLAN OF CARE
Problem: Patient Care Overview  Goal: Plan of Care Review  Outcome: Ongoing (interventions implemented as appropriate)  Pt verbalized understanding of plan of care. Fall precautions maintained.  Bed locked and low.  Call light and personal items within reach. SR up x 2.  Bed alarm on.  Instructed to call for assistance before getting OOB.   Bed locked and low.  Call light and personal items within reach. SR up x 2. Pt able to reposition independently in bed.   NSR on tele monitor.

## 2017-11-22 NOTE — ASSESSMENT & PLAN NOTE
-Plans for paracentesis today with fluid analysis to rule out SBP  -patient is no longer a candidate for liver transplant due to recent alcohol abuse  -GI following  -Continue prednisone

## 2017-11-22 NOTE — PROGRESS NOTES
Procedure complete. 2L yellow fluid removed from right abdomen. bandaid placed to heathy site c/d/i. Specimen sent to lab

## 2017-11-22 NOTE — ASSESSMENT & PLAN NOTE
Patient presents with chronic hyponatremia. Sodium level has been fluctuating between 120 and 118 over the past 10 days. Will try to elevate sodium to about 125. Will continue fluid restriction of 1000 ml/day. Na has increased from 116 to 120 over past 2 days.   Will monitor closely with repeat BMP.

## 2017-11-22 NOTE — PLAN OF CARE
Problem: Patient Care Overview  Goal: Plan of Care Review  Outcome: Ongoing (interventions implemented as appropriate)  Patient AAOx4. At times patient does need step by step instructions.    Vitals stable.  No complaints of pain.  No c/o CP or SOB.  Abdomen distended.  Patient tolerating diet. Soft BM on shift.   Patient repositioning in bed to prevent skin breakdown.  Bed alarm activated. No falls on shift.  Newton encouraged.  POC reviewed with patient and family.

## 2017-11-22 NOTE — ASSESSMENT & PLAN NOTE
-GI following  -Plan above  -given worsening transaminitis will rule out SBP.  - Avoid hepatotoxic agents.

## 2017-11-22 NOTE — SUBJECTIVE & OBJECTIVE
Interval History:Sodium 120 today. Nephrology considering Samsca. Paracentesis today with fluid analysis to rule out SBP.     Review of Systems   Constitutional: Positive for appetite change (decreased) and fatigue. Negative for chills, diaphoresis, fever and unexpected weight change.        Malaise   HENT: Negative for congestion, rhinorrhea, sinus pressure, sore throat and trouble swallowing.    Eyes: Negative for photophobia and visual disturbance.   Respiratory: Negative for apnea, cough, chest tightness, shortness of breath and wheezing.    Cardiovascular: Negative for chest pain, palpitations and leg swelling.   Gastrointestinal: Positive for abdominal distention. Negative for abdominal pain, blood in stool, constipation, diarrhea, nausea and vomiting.   Endocrine: Negative for polydipsia, polyphagia and polyuria.   Genitourinary: Negative for difficulty urinating, dysuria, frequency, hematuria and urgency.   Musculoskeletal: Positive for arthralgias. Negative for back pain, gait problem, joint swelling and myalgias.   Skin: Negative for pallor, rash and wound.   Neurological: Positive for weakness (generalized). Negative for dizziness, tremors, seizures, syncope, speech difficulty, light-headedness, numbness and headaches.   Psychiatric/Behavioral: Negative for agitation, confusion, hallucinations and sleep disturbance. The patient is not nervous/anxious.    All other systems reviewed and are negative.     Objective:     Vital Signs (Most Recent):  Temp: 97.8 °F (36.6 °C) (11/22/17 0820)  Pulse: 87 (11/22/17 1220)  Resp: 16 (11/22/17 1220)  BP: (!) 115/58 (11/22/17 1220)  SpO2: 99 % (11/22/17 1220) Vital Signs (24h Range):  Temp:  [97.4 °F (36.3 °C)-98.4 °F (36.9 °C)] 97.8 °F (36.6 °C)  Pulse:  [] 87  Resp:  [15-21] 16  SpO2:  [98 %-100 %] 99 %  BP: (109-124)/(43-66) 115/58     Weight: 72 kg (158 lb 11.7 oz)  Body mass index is 26.41 kg/m².    Intake/Output Summary (Last 24 hours) at 11/22/17 1237  Last  data filed at 11/22/17 0900   Gross per 24 hour   Intake              120 ml   Output              250 ml   Net             -130 ml      Physical Exam   Constitutional: She is oriented to person, place, and time. She appears well-developed. She has a sickly appearance.   HENT:   Head: Normocephalic.   Eyes: Pupils are equal, round, and reactive to light. Scleral icterus is present.   Neck: No thyromegaly present.   Cardiovascular: Normal rate and regular rhythm.  Exam reveals no friction rub.    Pulmonary/Chest: Effort normal and breath sounds normal. She has no wheezes. She exhibits no tenderness.   Abdominal: Soft. Bowel sounds are normal. She exhibits distension and ascites. There is no tenderness.   Musculoskeletal: She exhibits edema.   Trace LE edema.   Lymphadenopathy:     She has no cervical adenopathy.   Neurological: She is alert and oriented to person, place, and time.   Skin: Skin is warm and dry. No rash noted.   Psychiatric: She has a normal mood and affect.     Significant Labs:   CBC:   Recent Labs  Lab 11/20/17  1724 11/21/17  0346 11/22/17  0555   WBC 12.62 10.96 11.90   HGB 10.7* 10.6* 10.9*   HCT 28.9* 28.9* 29.7*   PLT 76* 59* 78*       Significant Imaging:   Imaging Results          US Abdomen Limited with Doppler (xpd) (Final result)  Result time 11/22/17 12:00:09    Final result by Crystal Deshpande MD (11/22/17 12:00:09)                 Impression:        Nodular liver contour compatible with cirrhosis.    Moderate ascites.    Patent portal vein.    Cholecystectomy.      Electronically signed by: CRYSTAL DESHPANDE MD  Date:     11/22/17  Time:    12:00              Narrative:    Limited abdominal ultrasound    History: Mass.  Portal vein thrombosis.    Comparison: 11/15/2017    Findings:    Cholecystectomy.  Common bile duct measures 4 mm.  Liver measures 14.8 cm and demonstrates nodular contour compatible with cirrhosis.  No focal hepatic masses. There is moderate ascites.  Portal  vein is patent with hepatopedal flow.                             X-Ray Chest AP Portable (Final result)  Result time 11/20/17 17:55:44    Final result by Crystal Deshpande MD (11/20/17 17:55:44)                 Impression:     No acute cardiopulmonary disease.            Electronically signed by: CRYSTAL DESHPANDE MD  Date:     11/20/17  Time:    17:55              Narrative:    EXAM:   [id number] CHEST AP PORTABLE    CLINICAL HISTORY:  Chest Pain    COMPARISON: 10/19/2017    Findings:     The lungs are clear. The cardiac silhouette is within normal limits.

## 2017-11-22 NOTE — ASSESSMENT & PLAN NOTE
-related to decompensated cirrhosis  Na 120 today.   -Continue to hold diuretics  - Fluid/free water restriction.  -Nephrology will consider Samsca.   -GI following.

## 2017-11-22 NOTE — TELEPHONE ENCOUNTER
Patient's case was discussed in liver selection committee today. Per committee discussion, the SW will call patient's daughter to discuss caregiver plan at this time. If the caregiver plan is adequate, patient will need to follow up with either/both addiction psych or SW regarding substance abuse and plan. May need to do this inpatient if patient is still admitted.

## 2017-11-22 NOTE — NURSING
Dr. Case made aware of pt's inability to void since this morning. Pt bladder scanned for >501 ml. Order received to in and out cath Q6 PRN for bladder scan >300 ml.

## 2017-11-22 NOTE — INTERVAL H&P NOTE
The patient has been examined and the H&P has been reviewed:    I concur with the findings and no changes have occurred since H&P was written.        Active Hospital Problems    Diagnosis  POA    *Acute on chronic hyponatremia [E87.1]  Yes    Alcoholic hepatitis with ascites- decompensated with elevated MELD [K70.11]  Yes    End stage liver disease [K72.90]  Yes    Moderate protein-calorie malnutrition [E44.0]  Yes    Coagulopathy [D68.9]  Yes     Chronic    Essential hypertension [I10]  Yes     Chronic     Overview:   ICD-10 Transition      Anemia of chronic disease [D64.9]  Yes     Chronic      Resolved Hospital Problems    Diagnosis Date Resolved POA   No resolved problems to display.

## 2017-11-22 NOTE — H&P (VIEW-ONLY)
"[facility]  Pulmonology  Consult Note    Patient Name: Marcie Bazan  MRN: [id number]  Admission Date: 11/20/2017  Hospital Length of Stay: 1 days  Code Status: Full Code  Attending Physician: Varsha Case MD  Primary Care Provider: ODILIA Wall   Principal Problem: Chronic hyponatremia      Subjective:     HPI:   Patient is a 53 yo  female with a history of decompensated alcoholic liver cirrhosis. She was transferred to the ED from inpatient rehab facility for worsening fatigue, malaise, lethargy, generalized weakness, and decreased appetite over the past 1 week. She is well known to the liver clinic and has been followed closely by Dr. Souza. She was recently discharged from [facility] for hyponatremia and worsening liver disease. She was previously a candidate for liver transplant but was denied secondary to alcohol abuse. She has been in inpatient rehab since last hospitalization in hopes to become a candidate for liver transplant again. She is currently in ICU and reports being extremely fatigued and weak. She denies any recent medication changes. She is afebrile. No overt confusion. Upon questioning, she reports some "dark" stools but was not able to tell me if this was new or has been going on. She denies any SOB at rest. No worsening ascites. Labs in ER H/H 10.7/28.9, Platelets 59, Sodium 116, Alk phos 284, Bili 23.5, INR 2.0 AST//593, MELD 32.       Past Medical History:   Diagnosis Date    Alcohol abuse      Alcoholic hepatitis      Anemia of chronic disease      Coagulopathy      End stage liver disease      Hypertension      Hyponatremia      Liver cirrhosis      Liver transplant candidate                 Past Surgical History:   Procedure Laterality Date    BREAST SURGERY        CHOLECYSTECTOMY        HYSTERECTOMY                   Review of patient's allergies indicates:   Allergen Reactions    Ferrous sulfate Other (See Comments) "       Patient states the pill makes her sick. She stated she would rather have a shot              Family History      Problem Relation (Age of Onset)     Depression Maternal Grandfather     Diabetes Father, Sister     Hypertension Mother, Father                 Social History Main Topics    Smoking status: Never Smoker    Smokeless tobacco: Never Used    Alcohol use Yes         Comment: Currently admitted to inpatient rehab    Drug use: No    Sexual activity: Not Currently          Review of Systems   Constitutional: Positive for activity change, appetite change and fatigue.   Respiratory: Negative for chest tightness and shortness of breath.    Cardiovascular: Negative for chest pain.   Gastrointestinal: Positive for abdominal distention. Negative for abdominal pain, anal bleeding, blood in stool, constipation, diarrhea, nausea, rectal pain and vomiting.   Musculoskeletal: Positive for arthralgias.   Psychiatric/Behavioral: Negative for confusion.      Objective:      Vital Signs (Most Recent):  Temp: 98 °F (36.7 °C) (11/21/17 1200)  Pulse: 93 (11/21/17 1200)  Resp: 20 (11/21/17 1200)  BP: 122/70 (11/21/17 1200)  SpO2: 100 % (11/21/17 1200) Vital Signs (24h Range):  Temp:  [97.7 °F (36.5 °C)-98.4 °F (36.9 °C)] 98 °F (36.7 °C)  Pulse:  [83-95] 93  Resp:  [13-20] 20  SpO2:  [98 %-100 %] 100 %  BP: (102-124)/(51-70) 122/70      Weight: 71.2 kg (156 lb 15.5 oz)  Body mass index is 26.12 kg/m².        Intake/Output Summary (Last 24 hours) at 11/21/17 1405  Last data filed at 11/21/17 0800    Gross per 24 hour   Intake           956.25 ml   Output                0 ml   Net           956.25 ml         Physical Exam   Constitutional: She is oriented to person, place, and time.   Eyes: Scleral icterus is present.   Cardiovascular: Normal rate, regular rhythm and normal heart sounds.    No murmur heard.  Pulmonary/Chest: Effort normal and breath sounds normal. No respiratory distress. She has no wheezes.   Abdominal:  Bowel sounds are normal. She exhibits distension. There is no tenderness.   Abdominal ascites    Neurological: She is alert and oriented to person, place, and time.   Psychiatric: She has a normal mood and affect. Her behavior is normal.         Vents:            Lines/Drains/Airways            Peripheral Intravenous Line                          Peripheral IV - Single Lumen 11/21/17 1104 Left Forearm less than 1 day                      Significant Labs:     CBC/Anemia Profile:     Recent Labs  Lab 11/20/17  1724 11/21/17  0346   WBC 12.62 10.96   HGB 10.7* 10.6*   HCT 28.9* 28.9*   PLT 76* 59*   MCV 79* 79*   RDW 24.5* 24.5*         Chemistries:     Recent Labs  Lab 11/20/17  1724 11/21/17  0032 11/21/17  0346 11/21/17  0834   * 116* 117* 119*   K 4.1 3.6 3.6 3.4*   CL 89* 89* 90* 91*   CO2 18* 19* 20* 21*   BUN 10 10 9 9   CREATININE 0.5 0.6 0.6 0.6   CALCIUM 8.7 8.3* 8.1* 8.3*   ALBUMIN 2.4*  --  2.1*  --    PROT 6.7  --  5.6*  --    BILITOT 23.5*  --  20.0*  --    ALKPHOS 319*  --  284*  --    *  --  491*  --    *  --  376*  --    MG 1.8  --   --   --    PHOS 2.4*  --   --   --       MELD-Na score: 32 at 11/21/2017  8:34 AM  MELD score: 26 at 11/21/2017  8:34 AM  Calculated from:  Serum Creatinine: 0.6 mg/dL (Rounded to 1) at 11/21/2017  8:34 AM  Serum Sodium: 119 mmol/L (Rounded to 125) at 11/21/2017  8:34 AM  Total Bilirubin: 20.0 mg/dL at 11/21/2017  3:46 AM  INR(ratio): 2.0 at 11/21/2017  3:46 AM  Age: 52 years        Significant Imaging:   I have reviewed and interpreted all pertinent imaging results/findings within the past 24 hours.    Assessment/Plan:       Alcoholic hepatitis with ascites- decompensated with elevated MELD     - Admitted to ICU for hyponatremia Na improving to 119. This is not far from her baseline over the last month. Goal >125  - Not currently on liver transplant list secondary to alcohol abuse.   - Diuretics on hold  - 1.5L fluid restriction  - Ascites present but  per patient is not increased from baseline. No SOB at rest  - No overt confusion  - Will discuss case with Dr. Souza for input. Prognosis guarded.   - Continue supportive care per ICU team.              Acute on chronic hyponatremia     Likely secondary to liver disease. Nephrology following       Coagulopathy     Secondary to alcoholic cirrhosis. Plan as above.        Anemia of chronic disease     Stable. Continue to monitor H/H  Subjective complaint of dark stool. Question as to whether this is melena. Monitor H/H. Will consider EGD if melanic stool witnessed by ICU staff or decrease in H/H.              Thank you for your consult. I will follow-up with patient. Please contact us if you have any additional questions.           Thank you for your consult. I will follow-up with patient. Please contact us if you have any additional questions.     Yaquelin Restrepo NP  Gastroenterology   [facility]

## 2017-11-22 NOTE — TELEPHONE ENCOUNTER
----- Message from Thuy Waldron sent at 11/22/2017  8:52 AM CST -----  Contact: [facility]  Caller request a call from the nurse to discuss the pt, please contact the caller at [phone]

## 2017-11-23 ENCOUNTER — HOSPITAL ENCOUNTER (INPATIENT)
Facility: HOSPITAL | Age: 52
LOS: 43 days | Discharge: HOME HEALTH | DRG: 005 | End: 2018-01-05
Attending: HOSPITALIST | Admitting: HOSPITALIST
Payer: COMMERCIAL

## 2017-11-23 VITALS
SYSTOLIC BLOOD PRESSURE: 111 MMHG | BODY MASS INDEX: 25.71 KG/M2 | OXYGEN SATURATION: 99 % | RESPIRATION RATE: 18 BRPM | HEIGHT: 65 IN | WEIGHT: 154.31 LBS | DIASTOLIC BLOOD PRESSURE: 63 MMHG | HEART RATE: 100 BPM | TEMPERATURE: 98 F

## 2017-11-23 DIAGNOSIS — R73.9 HYPERGLYCEMIA: ICD-10-CM

## 2017-11-23 DIAGNOSIS — D84.9 IMMUNOSUPPRESSION: ICD-10-CM

## 2017-11-23 DIAGNOSIS — K76.0 NAFLD (NONALCOHOLIC FATTY LIVER DISEASE): ICD-10-CM

## 2017-11-23 DIAGNOSIS — K70.11 ALCOHOLIC HEPATITIS WITH ASCITES: ICD-10-CM

## 2017-11-23 DIAGNOSIS — E66.9 OBESITY (BMI 30-39.9): ICD-10-CM

## 2017-11-23 DIAGNOSIS — Z94.4 S/P LIVER TRANSPLANT: Primary | ICD-10-CM

## 2017-11-23 DIAGNOSIS — E87.1 HYPONATREMIA: ICD-10-CM

## 2017-11-23 DIAGNOSIS — Z00.5 TRANSPLANT DONOR EVALUATION: ICD-10-CM

## 2017-11-23 DIAGNOSIS — K76.82 HEPATIC ENCEPHALOPATHY: ICD-10-CM

## 2017-11-23 DIAGNOSIS — D68.9 COAGULOPATHY: ICD-10-CM

## 2017-11-23 DIAGNOSIS — D63.8 ANEMIA OF CHRONIC DISEASE: ICD-10-CM

## 2017-11-23 DIAGNOSIS — Z91.89 AT RISK FOR OPPORTUNISTIC INFECTIONS: ICD-10-CM

## 2017-11-23 DIAGNOSIS — B25.9 CYTOMEGALOVIRUS (CMV) VIREMIA: ICD-10-CM

## 2017-11-23 DIAGNOSIS — E43 SEVERE PROTEIN-CALORIE MALNUTRITION: ICD-10-CM

## 2017-11-23 DIAGNOSIS — Z79.60 LONG-TERM USE OF IMMUNOSUPPRESSANT MEDICATION: Chronic | ICD-10-CM

## 2017-11-23 DIAGNOSIS — T38.0X5D ADVERSE EFFECT OF CORTICOSTEROIDS, SUBSEQUENT ENCOUNTER: ICD-10-CM

## 2017-11-23 DIAGNOSIS — Z76.82 ORGAN TRANSPLANT CANDIDATE: ICD-10-CM

## 2017-11-23 DIAGNOSIS — D84.9 IMMUNOSUPPRESSED STATUS: ICD-10-CM

## 2017-11-23 DIAGNOSIS — Z94.4 S/P LIVER TRANSPLANT: ICD-10-CM

## 2017-11-23 DIAGNOSIS — Z29.89 PROPHYLACTIC IMMUNOTHERAPY: ICD-10-CM

## 2017-11-23 DIAGNOSIS — T81.89XS DELAYED SURGICAL WOUND HEALING, SEQUELA: ICD-10-CM

## 2017-11-23 DIAGNOSIS — K72.90 DECOMPENSATED HEPATIC CIRRHOSIS: ICD-10-CM

## 2017-11-23 DIAGNOSIS — R53.81 PHYSICAL DECONDITIONING: ICD-10-CM

## 2017-11-23 DIAGNOSIS — T81.31XS: ICD-10-CM

## 2017-11-23 DIAGNOSIS — E83.42 HYPOMAGNESEMIA: ICD-10-CM

## 2017-11-23 DIAGNOSIS — K74.60 DECOMPENSATED HEPATIC CIRRHOSIS: ICD-10-CM

## 2017-11-23 DIAGNOSIS — I10 ESSENTIAL HYPERTENSION: Chronic | ICD-10-CM

## 2017-11-23 DIAGNOSIS — B95.2 ENTEROCOCCUS UTI: ICD-10-CM

## 2017-11-23 DIAGNOSIS — T38.0X5S ADVERSE EFFECT OF CORTICOSTEROIDS, SEQUELA: ICD-10-CM

## 2017-11-23 DIAGNOSIS — N17.9 AKI (ACUTE KIDNEY INJURY): ICD-10-CM

## 2017-11-23 DIAGNOSIS — N39.0 ENTEROCOCCUS UTI: ICD-10-CM

## 2017-11-23 DIAGNOSIS — K72.10 END STAGE LIVER DISEASE: ICD-10-CM

## 2017-11-23 DIAGNOSIS — K70.31 ALCOHOLIC CIRRHOSIS OF LIVER WITH ASCITES: ICD-10-CM

## 2017-11-23 PROBLEM — E72.20 SERUM AMMONIA INCREASED: Status: RESOLVED | Noted: 2017-10-19 | Resolved: 2017-11-23

## 2017-11-23 PROBLEM — D72.829 LEUCOCYTOSIS: Status: RESOLVED | Noted: 2017-10-19 | Resolved: 2017-11-23

## 2017-11-23 PROBLEM — R79.89 ELEVATED LACTIC ACID LEVEL: Status: RESOLVED | Noted: 2017-10-19 | Resolved: 2017-11-23

## 2017-11-23 LAB
ALBUMIN SERPL BCP-MCNC: 2 G/DL
ALBUMIN SERPL BCP-MCNC: 2.1 G/DL
ALP SERPL-CCNC: 292 U/L
ALP SERPL-CCNC: 329 U/L
ALT SERPL W/O P-5'-P-CCNC: 416 U/L
ALT SERPL W/O P-5'-P-CCNC: 431 U/L
AMMONIA PLAS-SCNC: 50 UMOL/L
ANION GAP SERPL CALC-SCNC: 8 MMOL/L
ANION GAP SERPL CALC-SCNC: 9 MMOL/L
ANISOCYTOSIS BLD QL SMEAR: SLIGHT
AST SERPL-CCNC: 294 U/L
AST SERPL-CCNC: 306 U/L
BASOPHILS # BLD AUTO: 0.01 K/UL
BASOPHILS NFR BLD: 0.1 %
BILIRUB SERPL-MCNC: 21.3 MG/DL
BILIRUB SERPL-MCNC: 23.7 MG/DL
BUN SERPL-MCNC: 10 MG/DL
BUN SERPL-MCNC: 10 MG/DL
BUN SERPL-MCNC: 11 MG/DL
BUN SERPL-MCNC: 11 MG/DL
BUN SERPL-MCNC: 8 MG/DL
CALCIUM SERPL-MCNC: 8.2 MG/DL
CALCIUM SERPL-MCNC: 8.2 MG/DL
CALCIUM SERPL-MCNC: 8.3 MG/DL
CALCIUM SERPL-MCNC: 8.4 MG/DL
CALCIUM SERPL-MCNC: 8.4 MG/DL
CHLORIDE SERPL-SCNC: 89 MMOL/L
CHLORIDE SERPL-SCNC: 89 MMOL/L
CHLORIDE SERPL-SCNC: 90 MMOL/L
CHLORIDE SERPL-SCNC: 90 MMOL/L
CHLORIDE SERPL-SCNC: 91 MMOL/L
CO2 SERPL-SCNC: 21 MMOL/L
CO2 SERPL-SCNC: 21 MMOL/L
CO2 SERPL-SCNC: 22 MMOL/L
CREAT SERPL-MCNC: 0.6 MG/DL
CREAT SERPL-MCNC: 0.7 MG/DL
CREAT SERPL-MCNC: 0.7 MG/DL
DACRYOCYTES BLD QL SMEAR: ABNORMAL
DIFFERENTIAL METHOD: ABNORMAL
EOSINOPHIL # BLD AUTO: 0.1 K/UL
EOSINOPHIL NFR BLD: 0.5 %
ERYTHROCYTE [DISTWIDTH] IN BLOOD BY AUTOMATED COUNT: 24.5 %
EST. GFR  (AFRICAN AMERICAN): >60 ML/MIN/1.73 M^2
EST. GFR  (NON AFRICAN AMERICAN): >60 ML/MIN/1.73 M^2
GLUCOSE SERPL-MCNC: 100 MG/DL
GLUCOSE SERPL-MCNC: 114 MG/DL
GLUCOSE SERPL-MCNC: 122 MG/DL
GLUCOSE SERPL-MCNC: 126 MG/DL
GLUCOSE SERPL-MCNC: 98 MG/DL
HCT VFR BLD AUTO: 29.7 %
HGB BLD-MCNC: 11.1 G/DL
HYPOCHROMIA BLD QL SMEAR: ABNORMAL
INR PPP: 2.1
INR PPP: 2.2
LACTATE SERPL-SCNC: 2.3 MMOL/L
LYMPHOCYTES # BLD AUTO: 2.6 K/UL
LYMPHOCYTES NFR BLD: 23.3 %
MCH RBC QN AUTO: 29.7 PG
MCHC RBC AUTO-ENTMCNC: 37.4 G/DL
MCV RBC AUTO: 79 FL
MONOCYTES # BLD AUTO: 0.9 K/UL
MONOCYTES NFR BLD: 7.7 %
NEUTROPHILS # BLD AUTO: 7.6 K/UL
NEUTROPHILS NFR BLD: 68.8 %
PLATELET # BLD AUTO: 80 K/UL
PMV BLD AUTO: ABNORMAL FL
POIKILOCYTOSIS BLD QL SMEAR: ABNORMAL
POTASSIUM SERPL-SCNC: 3.3 MMOL/L
POTASSIUM SERPL-SCNC: 3.4 MMOL/L
POTASSIUM SERPL-SCNC: 3.5 MMOL/L
PROT SERPL-MCNC: 5.6 G/DL
PROT SERPL-MCNC: 5.7 G/DL
PROTHROMBIN TIME: 21.4 SEC
PROTHROMBIN TIME: 22.2 SEC
RBC # BLD AUTO: 3.74 M/UL
SODIUM SERPL-SCNC: 119 MMOL/L
SODIUM SERPL-SCNC: 120 MMOL/L
SODIUM SERPL-SCNC: 121 MMOL/L
STOMATOCYTES BLD QL SMEAR: PRESENT
TARGETS BLD QL SMEAR: ABNORMAL
WBC # BLD AUTO: 11.15 K/UL

## 2017-11-23 PROCEDURE — 85025 COMPLETE CBC W/AUTO DIFF WBC: CPT

## 2017-11-23 PROCEDURE — 83735 ASSAY OF MAGNESIUM: CPT

## 2017-11-23 PROCEDURE — 85730 THROMBOPLASTIN TIME PARTIAL: CPT

## 2017-11-23 PROCEDURE — 80048 BASIC METABOLIC PNL TOTAL CA: CPT | Mod: 91

## 2017-11-23 PROCEDURE — 84100 ASSAY OF PHOSPHORUS: CPT

## 2017-11-23 PROCEDURE — 63600175 PHARM REV CODE 636 W HCPCS: Performed by: INTERNAL MEDICINE

## 2017-11-23 PROCEDURE — 80053 COMPREHEN METABOLIC PANEL: CPT | Mod: 91

## 2017-11-23 PROCEDURE — 85610 PROTHROMBIN TIME: CPT | Mod: 91

## 2017-11-23 PROCEDURE — 25000003 PHARM REV CODE 250: Performed by: NURSE PRACTITIONER

## 2017-11-23 PROCEDURE — 36415 COLL VENOUS BLD VENIPUNCTURE: CPT

## 2017-11-23 PROCEDURE — 83605 ASSAY OF LACTIC ACID: CPT

## 2017-11-23 PROCEDURE — 99232 SBSQ HOSP IP/OBS MODERATE 35: CPT | Mod: ,,, | Performed by: INTERNAL MEDICINE

## 2017-11-23 PROCEDURE — 85610 PROTHROMBIN TIME: CPT

## 2017-11-23 PROCEDURE — 82140 ASSAY OF AMMONIA: CPT

## 2017-11-23 PROCEDURE — 80053 COMPREHEN METABOLIC PANEL: CPT

## 2017-11-23 PROCEDURE — 11000001 HC ACUTE MED/SURG PRIVATE ROOM

## 2017-11-23 RX ORDER — POTASSIUM CHLORIDE 20 MEQ/15ML
40 SOLUTION ORAL ONCE
Status: COMPLETED | OUTPATIENT
Start: 2017-11-23 | End: 2017-11-23

## 2017-11-23 RX ORDER — CETIRIZINE HYDROCHLORIDE 10 MG/1
10 TABLET ORAL DAILY
Status: DISCONTINUED | OUTPATIENT
Start: 2017-11-24 | End: 2017-11-24

## 2017-11-23 RX ORDER — ONDANSETRON 4 MG/1
4 TABLET, ORALLY DISINTEGRATING ORAL EVERY 8 HOURS PRN
Status: DISCONTINUED | OUTPATIENT
Start: 2017-11-24 | End: 2018-01-05 | Stop reason: HOSPADM

## 2017-11-23 RX ORDER — SODIUM CHLORIDE 0.9 % (FLUSH) 0.9 %
3 SYRINGE (ML) INJECTION
Status: DISCONTINUED | OUTPATIENT
Start: 2017-11-24 | End: 2018-01-05 | Stop reason: HOSPADM

## 2017-11-23 RX ORDER — PREDNISONE 20 MG/1
20 TABLET ORAL DAILY
Status: DISCONTINUED | OUTPATIENT
Start: 2017-11-24 | End: 2017-11-25

## 2017-11-23 RX ORDER — PANTOPRAZOLE SODIUM 40 MG/1
40 TABLET, DELAYED RELEASE ORAL DAILY
Status: DISCONTINUED | OUTPATIENT
Start: 2017-11-24 | End: 2018-01-04

## 2017-11-23 RX ADMIN — LACTULOSE 30 G: 10 SOLUTION ORAL at 05:11

## 2017-11-23 RX ADMIN — PREDNISONE 20 MG: 20 TABLET ORAL at 09:11

## 2017-11-23 RX ADMIN — CETIRIZINE HYDROCHLORIDE 10 MG: 10 TABLET, FILM COATED ORAL at 09:11

## 2017-11-23 RX ADMIN — PANTOPRAZOLE SODIUM 40 MG: 40 TABLET, DELAYED RELEASE ORAL at 09:11

## 2017-11-23 RX ADMIN — POTASSIUM CHLORIDE 40 MEQ: 20 SOLUTION ORAL at 03:11

## 2017-11-23 RX ADMIN — LACTULOSE 30 G: 10 SOLUTION ORAL at 03:11

## 2017-11-23 NOTE — ASSESSMENT & PLAN NOTE
-related to decompensated cirrhosis  -Na 121 today  -Continue to hold diuretics  - Fluid/free water restriction.  -Nephrology will consider Samsca.   -GI following.

## 2017-11-23 NOTE — PLAN OF CARE
Problem: Patient Care Overview  Goal: Plan of Care Review  Outcome: Ongoing (interventions implemented as appropriate)  Pt alert and oriented. POC reviewed. I & Os monitored. Pt remained free of falls during shift, . Bed alarm set , call light in reach, room free of clutter, side rails  up x2, pt on telemetry monitor SR, will continue to monitor.

## 2017-11-23 NOTE — ASSESSMENT & PLAN NOTE
Patient presents with chronic hyponatremia. Sodium level has been fluctuating between 120 and 118 over the past 10 days. Na at 121 today. Will continue fluid restriction of 1000 ml/day. Will also taper Prednisone. This could have led to endogenous adrenal steroid inhibition which is associated with hyponatremia. Will monitor am cortisol level once prednisone has been tapered off.   Will monitor closely with repeat BMP.

## 2017-11-23 NOTE — PROGRESS NOTES
[facility]  Gastroenterology  Progress Note    Patient Name: Marcie Bazan  MRN: [id number]  Admission Date: 11/20/2017  Hospital Length of Stay: 3 days  Code Status: Full Code   Attending Provider: Varsha Case MD  Consulting Provider: Joselin Souza MD  Primary Care Physician: ODILIA Wall  Principal Problem: Chronic hyponatremia      Subjective:     Interval History: Infection w/u done yesterday negative. Patient still with hyponatremia.    Review of Systems   Constitutional: Positive for activity change and fatigue. Chills: decreased.   HENT: Negative.    Eyes: Negative.    Respiratory: Negative.    Cardiovascular: Negative.    Gastrointestinal: Positive for abdominal distention.   Genitourinary: Negative.    Musculoskeletal: Negative.    Skin: Positive for color change.   Neurological: Positive for weakness.   Psychiatric/Behavioral: Negative.      Objective:     Vital Signs (Most Recent):  Temp: 98.1 °F (36.7 °C) (11/23/17 1220)  Pulse: 101 (11/23/17 1300)  Resp: 18 (11/23/17 1220)  BP: (!) 115/57 (11/23/17 1220)  SpO2: 98 % (11/23/17 1220) Vital Signs (24h Range):  Temp:  [97.8 °F (36.6 °C)-98.3 °F (36.8 °C)] 98.1 °F (36.7 °C)  Pulse:  [] 101  Resp:  [18] 18  SpO2:  [97 %-100 %] 98 %  BP: (101-115)/(51-63) 115/57     Weight: 70 kg (154 lb 5.2 oz) (11/23/17 0348)  Body mass index is 25.68 kg/m².      Intake/Output Summary (Last 24 hours) at 11/23/17 1602  Last data filed at 11/23/17 0600   Gross per 24 hour   Intake              240 ml   Output              650 ml   Net             -410 ml       Lines/Drains/Airways     Peripheral Intravenous Line                 Peripheral IV - Single Lumen 11/21/17 1104 Left Forearm 2 days                Physical Exam   Constitutional: She is oriented to person, place, and time. No distress.   Appears weak   HENT:   Head: Normocephalic and atraumatic.   Mouth/Throat: Oropharynx is clear and moist. No oropharyngeal exudate.   Eyes:  Conjunctivae are normal. Pupils are equal, round, and reactive to light. Right eye exhibits no discharge. Left eye exhibits no discharge. Scleral icterus is present.   Pulmonary/Chest: Effort normal and breath sounds normal. No respiratory distress. She has no wheezes.   Abdominal: Soft. She exhibits distension (ascites, nontense). There is no tenderness.   Musculoskeletal: She exhibits no edema.   Neurological: She is alert and oriented to person, place, and time.   Psychiatric: She has a normal mood and affect. Her behavior is normal.   Vitals reviewed.      Significant Labs:  CBC:   Recent Labs  Lab 11/22/17  0555 11/23/17  0530   WBC 11.90 11.15   HGB 10.9* 11.1*   HCT 29.7* 29.7*   PLT 78* 80*     CMP:   Recent Labs  Lab 11/23/17 0530 11/23/17  0801   GLU 98 100   CALCIUM 8.2* 8.4*   ALBUMIN 2.0*  --    PROT 5.6*  --    * 121*   K 3.4* 3.3*   CO2 21* 22*   CL 90* 91*   BUN 10 10   CREATININE 0.6 0.6   ALKPHOS 292*  --    *  --    *  --    BILITOT 21.3*  --      Coagulation:   Recent Labs  Lab 11/23/17 0530   INR 2.1*         Significant Imaging:  Unremarkable doppler     MELD-Na score: 32 at 11/23/2017  8:01 AM  MELD score: 26 at 11/23/2017  8:01 AM  Calculated from:  Serum Creatinine: 0.6 mg/dL (Rounded to 1) at 11/23/2017  8:01 AM  Serum Sodium: 121 mmol/L (Rounded to 125) at 11/23/2017  8:01 AM  Total Bilirubin: 21.3 mg/dL at 11/23/2017  5:30 AM  INR(ratio): 2.1 at 11/23/2017  5:30 AM  Age: 52 years      Assessment/Plan:     * Acute on chronic hyponatremia    Stable but still low, uncertain if prednisone could be making this worse  -Will start pred taper; dose decreased to 20mg today  -Continue on strict fluid restriction 1200cc, no free water  -Continue to hold diuretics  -Check AM cortisol        Alcoholic hepatitis with ascites- decompensated with elevated MELD    High risk with MELD >30, not responsive to steroid now risks of steroids > benefit  -Taper pred  -Given high risk with  elevated MELD recommend transfer to [place] to re-eval liver transplant candidacy now that patient acknowledges a problem with alcohol. She was in an inpt facility for about 14 weeks but was too sick to participate completely. Needs re-eval in [place] and decide on realistic options if continued rehab required prior to listing            Thank you for your consult. I will follow-up with patient. Please contact us if you have any additional questions.    Joselin Souza MD  Gastroenterology  [facility]

## 2017-11-23 NOTE — PROGRESS NOTES
[facility]   Nephrology  Progress Note    Patient Name: Marcie Bazan  MRN: [id number]  Admission Date: 11/20/2017  Hospital Length of Stay: 3 days  Attending Provider: Varsha Case MD   Primary Care Physician: ODILIA Wall  Principal Problem:Chronic hyponatremia    Subjective:     HPI: 52 year old female with h/o EtOH abuse, alcoholic hepatitis, anemia, ESLD, hypertension, hyponatremia, liver cirrhosis [place] [facility] with generalized malaise.  Workup revealed worsening hyponatremia and Nephrology was consulted to help with patient's renal care. I saw and examined patient in her hospital room. Patient reports generalized weakness and fatigue. Also reports mild dysuria. No other issues at present.  Chart review revealed that hyponatremia is chronic and patient's sodium level has been fluctuating between 120 and 118 over the past 10 days.       Interval History:     11/22/17: Patient reports being fatigued.     11/23/17: Patient is watching TV, no complaints at present.           Review of patient's allergies indicates:   Allergen Reactions    Ferrous sulfate Other (See Comments)     Patient states the pill makes her sick. She stated she would rather have a shot     Current Facility-Administered Medications   Medication Frequency    cetirizine tablet 10 mg Daily    lactulose 20 gram/30 mL solution Soln 30 g TID    ondansetron disintegrating tablet 4 mg Q8H PRN    pantoprazole EC tablet 40 mg Daily    potassium chloride 10% solution 40 mEq Once    potassium chloride SA CR tablet 40 mEq Once    predniSONE tablet 20 mg Daily    ramelteon tablet 8 mg Nightly PRN       Objective:     Vital Signs (Most Recent):  Temp: 98.1 °F (36.7 °C) (11/23/17 1220)  Pulse: 97 (11/23/17 1220)  Resp: 18 (11/23/17 1220)  BP: (!) 115/57 (11/23/17 1220)  SpO2: 98 % (11/23/17 1220)  O2 Device (Oxygen Therapy): room air (11/23/17 1220) Vital Signs (24h Range):  Temp:  [97.8 °F (36.6  °C)-98.3 °F (36.8 °C)] 98.1 °F (36.7 °C)  Pulse:  [87-97] 97  Resp:  [18] 18  SpO2:  [97 %-100 %] 98 %  BP: (101-115)/(51-63) 115/57     Weight: 70 kg (154 lb 5.2 oz) (11/23/17 0348)  Body mass index is 25.68 kg/m².  Body surface area is 1.79 meters squared.    I/O last 3 completed shifts:  In: 840 [P.O.:840]  Out: 900 [Urine:900]    Physical Exam   Constitutional: She is oriented to person, place, and time. She appears well-developed and well-nourished.   HENT:   Head: Normocephalic.   Eyes: Pupils are equal, round, and reactive to light. Scleral icterus is present.   Neck: No thyromegaly present.   Cardiovascular: Normal rate and regular rhythm.  Exam reveals no friction rub.    Pulmonary/Chest: Effort normal and breath sounds normal. She has no wheezes. She exhibits no tenderness.   Abdominal: Soft. Bowel sounds are normal. She exhibits distension. There is no tenderness.   Musculoskeletal: She exhibits edema.   Trace LE edema.   Lymphadenopathy:     She has no cervical adenopathy.   Neurological: She is alert and oriented to person, place, and time.   Skin: Skin is warm and dry. No rash noted.   Psychiatric: She has a normal mood and affect.       Significant Labs:  Lab Results   Component Value Date    CREATININE 0.6 11/23/2017    BUN 10 11/23/2017     (L) 11/23/2017    K 3.3 (L) 11/23/2017    CL 91 (L) 11/23/2017    CO2 22 (L) 11/23/2017     Lab Results   Component Value Date    CALCIUM 8.4 (L) 11/23/2017    PHOS 2.4 (L) 11/20/2017     Lab Results   Component Value Date    ALBUMIN 2.0 (L) 11/23/2017     Lab Results   Component Value Date    WBC 11.15 11/23/2017    HGB 11.1 (L) 11/23/2017    HCT 29.7 (L) 11/23/2017    MCV 79 (L) 11/23/2017    PLT 80 (L) 11/23/2017       Recent Labs  Lab 11/20/17  1724   MG 1.8         Significant Imaging:  Imaging Results          US Guided Paracentesis (Final result)  Result time 11/22/17 13:04:11    Final result by Romeo Foster MD (11/22/17 13:04:11)                  Impression:     Successful paracentesis as above.      Electronically signed by: EDINSON GUILLORY MD  Date:     11/22/17  Time:    13:04              Narrative:    Procedure: Paracentesis    History: Ascites    Radiologist: Laurita Guillory    Procedure codes: 92380, 99270    Findings: Informed consent was obtained prior to the exam after discussion of risk and benefits of the procedure.  No sedation was administered.  Ultrasound demonstrated a moderate collection within the right lower quadrant.  The skin was prepped and draped under sterile conditions and 1% lidocaine was used for local anesthesia.  A [employer] centesis needle was used to access the fluid collection.  There is return of 2 L of fluid which was serous.  The catheter was subsequently removed and hemostasis was achieved.  The patient tolerated the procedure.                             US Abdomen Limited with Doppler (xpd) (Final result)  Result time 11/22/17 12:00:09    Final result by Crystal Deshpande MD (11/22/17 12:00:09)                 Impression:        Nodular liver contour compatible with cirrhosis.    Moderate ascites.    Patent portal vein.    Cholecystectomy.      Electronically signed by: CRYSTAL DESHPANDE MD  Date:     11/22/17  Time:    12:00              Narrative:    Limited abdominal ultrasound    History: Mass.  Portal vein thrombosis.    Comparison: 11/15/2017    Findings:    Cholecystectomy.  Common bile duct measures 4 mm.  Liver measures 14.8 cm and demonstrates nodular contour compatible with cirrhosis.  No focal hepatic masses. There is moderate ascites.  Portal vein is patent with hepatopedal flow.                             X-Ray Chest AP Portable (Final result)  Result time 11/20/17 17:55:44    Final result by Crystal Deshpande MD (11/20/17 17:55:44)                 Impression:     No acute cardiopulmonary disease.            Electronically signed by: CRYSTAL DESHPANDE MD  Date:     11/20/17  Time:    17:55               Narrative:    EXAM:   [id number] CHEST AP PORTABLE    CLINICAL HISTORY:  Chest Pain    COMPARISON: 10/19/2017    Findings:     The lungs are clear. The cardiac silhouette is within normal limits.                                Assessment/Plan:     * Acute on chronic hyponatremia    Patient presents with chronic hyponatremia. Sodium level has been fluctuating between 120 and 118 over the past 10 days. Na at 121 today. Will continue fluid restriction of 1000 ml/day. Will also taper Prednisone. This could have led to endogenous adrenal steroid inhibition which is associated with hyponatremia. Will monitor am cortisol level once prednisone has been tapered off.   Will monitor closely with repeat BMP.         Hypokalemia    Replete K.             Thank you for your consult. I will follow-up with patient. Please contact us if you have any additional questions.    Lito Cullen MD  Nephrology  [facility]

## 2017-11-23 NOTE — ASSESSMENT & PLAN NOTE
High risk with MELD >30, not responsive to steroid now risks of steroids > benefit  -Taper pred  -Given high risk with elevated MELD recommend transfer to [place] to re-eval liver transplant candidacy now that patient acknowledges a problem with alcohol. She was in an inpt facility for about 14 weeks but was too sick to participate completely. Needs re-eval in [place] and decide on realistic options if continued rehab required prior to listing

## 2017-11-23 NOTE — PROGRESS NOTES
"Pt has not urinated since in and out this AM at 0800.  Spoke with shell about in and out order for bladder scan >350; NP stated to "bladder scan and notify NP of bladder scan result because if pt holding on to 600mL or greater then will most likely put stevens in and leave stevens in place".  Pt and family at bedside verbalized understanding, pt trying to eat and does not want to be bladder scanned at this time.  Pt denies feeling the need to void.  Will reattempt to bladder scan once pt done eating.   "

## 2017-11-23 NOTE — PROGRESS NOTES
"Pt reported to PCT Marcia that she urinated via toilet "alot" but wasn't able measure it.  Will update NP and continue to monitor.  "

## 2017-11-23 NOTE — DISCHARGE SUMMARY
[facility] Hospital Medicine  Discharge Summary      Patient Name: Marcie Bazan  MRN: [id number]  Admission Date: 11/20/2017  Hospital Length of Stay: 3 days  Discharge Date and Time:  11/23/2017 4:29 PM  Attending Physician: Varsha Case MD   Discharging Provider: Grace Carrasquillo NP  Primary Care Provider: ODILIA Wall      HPI:   Ms. Bazan is a 53 yo female with a PMHx of alcoholic hepatitis and cirrhosis with ESLD (progressively worsening MELD-Na score of 32) on liver transplant list, who was sent from inpatient rehab to ED for c/o fatigue, malaise, lethargy, generalized weakness, and decreased appetite over the past 1 week.  She was recently discharged from [facility] 11/15 after a 5 day hospitalization for hyponatremia and worsening liver disease.  She was discharged to [facility] for inpatient EtOH rehab, where she has steadily declined.  Initial work-up in ED resulted Na 116, , , ammonia 47, total bili 23.5, INR 2, H&H 10.7/29, plt 76; VSS.   CXR and EKG unremarkable.  Hospital Medicine was consulted for admission.  Currently, patient is drowsy but easily aroused.  Denies any lightheadedness/dizziness, syncope, AMS, visual disturbances, focal deficits, numbness/tingling, HA, seizure-like activity, chest pain, palpitations, SOB, cough, orthopnea, PND, edema, weight gain, ABD pain, N/V/D, dysuria, hematuria, fever, or chills.   She is followed closely by Dr. Souza for her ESLD.    * No surgery found *      Hospital Course:   52 year old AAF with alcoholic hepatitis admitted for chronic hyponatremia. ED labs revealed Na 116, , , ammonia 47, total bili 23.5, INR 2, H&H 10.7/29, plt 76; VSS. CXR and EKG unremarkable. Nephrology and GI consulted. Patient was recently admitted for hyponatremia and worsening liver disease. Sodium goal is 125. Diuretics held and fluid restriction initiated. MELD score 32. Paracentesis 11/22/17  removing 2 L of fluid. Ascitic analysis negative for SBP. She has required straight catheter x 2 since yesterday. Meld score has been greater than 30 since admission. Case discussed with Dr. Souza who feels patient will benefit from transfer for further evaluation. She spoke with Dr. Lal who agreed with above. Case discussed with Dr. Poole who agrees to accept patient for transfer. Patient will transfer to [facility] for further treatment of liver disease. Patient seen and examined on date of discharge and deemed suitable.      Consults:   Consults         Status Ordering Provider     Inpatient consult to Dietary  Once     Provider:  (Not yet assigned)    Completed SHIRA MILES     Inpatient consult to Gastroenterology  Once     Provider:  (Not yet assigned)    Completed EUGENE TEE     Inpatient consult to Nephrology  Once     Provider:  Lito Cullen MD    Acknowledged SHIRA MILES        Final Active Diagnoses:    Diagnosis Date Noted POA    PRINCIPAL PROBLEM:  Acute on chronic hyponatremia [E87.1] 11/20/2017 Yes    Alcoholic hepatitis with ascites- decompensated with elevated MELD [K70.11] 11/21/2017 Yes    End stage liver disease [K72.90] 11/10/2017 Yes    Moderate protein-calorie malnutrition [E44.0] 10/19/2017 Yes    Hypokalemia [E87.6] 10/19/2017 Yes    Coagulopathy [D68.9] 09/13/2017 Yes     Chronic    Essential hypertension [I10] 09/11/2017 Yes     Chronic    Anemia of chronic disease [D64.9] 08/27/2015 Yes     Chronic      Problems Resolved During this Admission:    Diagnosis Date Noted Date Resolved POA     Discharged Condition: stable    Disposition: Another Health Care Inst*    Follow Up:    Patient Instructions:     Diet general         Significant Diagnostic Studies: Labs:   CMP   Recent Labs  Lab 11/22/17  0555  11/22/17  2316 11/23/17  0530 11/23/17  0801   *  < > 120* 119* 121*   K 3.3*  < > 3.5 3.4* 3.3*   CL 90*  < > 90* 90* 91*   CO2 21*  < > 21*  21* 22*     < > 122* 98 100   BUN 9  < > 8 10 10   CREATININE 0.6  < > 0.6 0.6 0.6   CALCIUM 8.2*  < > 8.2* 8.2* 8.4*   PROT 5.8*  --   --  5.6*  --    ALBUMIN 2.1*  --   --  2.0*  --    BILITOT 21.1*  --   --  21.3*  --    ALKPHOS 308*  --   --  292*  --    *  --   --  306*  --    *  --   --  431*  --    ANIONGAP 9  < > 9 8 8   ESTGFRAFRICA >60  < > >60 >60 >60   EGFRNONAA >60  < > >60 >60 >60   < > = values in this interval not displayed. and CBC   Recent Labs  Lab 11/22/17  0555 11/23/17  0530   WBC 11.90 11.15   HGB 10.9* 11.1*   HCT 29.7* 29.7*   PLT 78* 80*       Pending Diagnostic Studies:     Procedure Component Value Units Date/Time    Basic metabolic panel [[id number]] Collected:  11/23/17 1549    Order Status:  Sent Lab Status:  In process Updated:  11/23/17 1614    Specimen:  Blood from Blood     CMV DNA, quantitative, PCR [[id number]] Collected:  11/21/17 1602    Order Status:  Sent Lab Status:  In process Updated:  11/21/17 2112    Specimen:  Blood from Blood          Medications:  Reconciled Home Medications:   Current Discharge Medication List      CONTINUE these medications which have NOT CHANGED    Details   cetirizine (ZYRTEC) 10 MG tablet Take 10 mg by mouth once daily.      lactulose (CHRONULAC) 10 gram/15 mL solution TK 45 ML PO QID  Refills: 2      lisinopril (PRINIVIL,ZESTRIL) 5 MG tablet Take 1 tablet by mouth only as needed for high blood pressure      omeprazole (PRILOSEC) 40 MG capsule Take 40 mg by mouth once daily.      ondansetron (ZOFRAN-ODT) 4 MG TbDL Take 1 tablet (4 mg total) by mouth every 8 (eight) hours as needed (nausea and vomiting).  Qty: 12 tablet, Refills: 0      potassium chloride SA (K-DUR,KLOR-CON) 20 MEQ tablet Taking only when taking lasix  Qty: 14 tablet, Refills: 0      predniSONE (DELTASONE) 20 MG tablet Take 2 tablets (40 mg total) by mouth once daily.  Qty: 60 tablet, Refills: 1      spironolactone (ALDACTONE) 100 MG tablet Hold until follow-up  with GI  Qty: 14 tablet, Refills: 0         STOP taking these medications       furosemide (LASIX) 40 MG tablet Comments:   Reason for Stopping:               Indwelling Lines/Drains at time of discharge:   Lines/Drains/Airways          No matching active lines, drains, or airways          Time spent on the discharge of patient: >35 minutes  Patient was seen and examined on the date of discharge and determined to be suitable for discharge.       Grace Carrasquillo NP  Department of Hospital Medicine  [facility]

## 2017-11-23 NOTE — PROGRESS NOTES
Pt bladder scanned >300 noted.PT denies any discomfort or pain at this time.  In and out cath performed removed 150 ml. Will continue to monitor

## 2017-11-23 NOTE — SUBJECTIVE & OBJECTIVE
Subjective:     Interval History: Infection w/u done yesterday negative. Patient still with hyponatremia.    Review of Systems   Constitutional: Positive for activity change and fatigue. Chills: decreased.   HENT: Negative.    Eyes: Negative.    Respiratory: Negative.    Cardiovascular: Negative.    Gastrointestinal: Positive for abdominal distention.   Genitourinary: Negative.    Musculoskeletal: Negative.    Skin: Positive for color change.   Neurological: Positive for weakness.   Psychiatric/Behavioral: Negative.      Objective:     Vital Signs (Most Recent):  Temp: 98.1 °F (36.7 °C) (11/23/17 1220)  Pulse: 101 (11/23/17 1300)  Resp: 18 (11/23/17 1220)  BP: (!) 115/57 (11/23/17 1220)  SpO2: 98 % (11/23/17 1220) Vital Signs (24h Range):  Temp:  [97.8 °F (36.6 °C)-98.3 °F (36.8 °C)] 98.1 °F (36.7 °C)  Pulse:  [] 101  Resp:  [18] 18  SpO2:  [97 %-100 %] 98 %  BP: (101-115)/(51-63) 115/57     Weight: 70 kg (154 lb 5.2 oz) (11/23/17 0348)  Body mass index is 25.68 kg/m².      Intake/Output Summary (Last 24 hours) at 11/23/17 1602  Last data filed at 11/23/17 0600   Gross per 24 hour   Intake              240 ml   Output              650 ml   Net             -410 ml       Lines/Drains/Airways     Peripheral Intravenous Line                 Peripheral IV - Single Lumen 11/21/17 1104 Left Forearm 2 days                Physical Exam   Constitutional: She is oriented to person, place, and time. No distress.   Appears weak   HENT:   Head: Normocephalic and atraumatic.   Mouth/Throat: Oropharynx is clear and moist. No oropharyngeal exudate.   Eyes: Conjunctivae are normal. Pupils are equal, round, and reactive to light. Right eye exhibits no discharge. Left eye exhibits no discharge. Scleral icterus is present.   Pulmonary/Chest: Effort normal and breath sounds normal. No respiratory distress. She has no wheezes.   Abdominal: Soft. She exhibits distension (ascites, nontense). There is no tenderness.    Musculoskeletal: She exhibits no edema.   Neurological: She is alert and oriented to person, place, and time.   Psychiatric: She has a normal mood and affect. Her behavior is normal.   Vitals reviewed.      Significant Labs:  CBC:   Recent Labs  Lab 11/22/17  0555 11/23/17  0530   WBC 11.90 11.15   HGB 10.9* 11.1*   HCT 29.7* 29.7*   PLT 78* 80*     CMP:   Recent Labs  Lab 11/23/17  0530 11/23/17  0801   GLU 98 100   CALCIUM 8.2* 8.4*   ALBUMIN 2.0*  --    PROT 5.6*  --    * 121*   K 3.4* 3.3*   CO2 21* 22*   CL 90* 91*   BUN 10 10   CREATININE 0.6 0.6   ALKPHOS 292*  --    *  --    *  --    BILITOT 21.3*  --      Coagulation:   Recent Labs  Lab 11/23/17  0530   INR 2.1*         Significant Imaging:  Unremarkable doppler     MELD-Na score: 32 at 11/23/2017  8:01 AM  MELD score: 26 at 11/23/2017  8:01 AM  Calculated from:  Serum Creatinine: 0.6 mg/dL (Rounded to 1) at 11/23/2017  8:01 AM  Serum Sodium: 121 mmol/L (Rounded to 125) at 11/23/2017  8:01 AM  Total Bilirubin: 21.3 mg/dL at 11/23/2017  5:30 AM  INR(ratio): 2.1 at 11/23/2017  5:30 AM  Age: 52 years

## 2017-11-23 NOTE — ASSESSMENT & PLAN NOTE
-s/p paracentesis removing 2L of fluid. Analysis negative for SBP  -patient is no longer a candidate for liver transplant due to recent alcohol intake  -GI following  -Continue prednisone    MELD-Na score: 32 at 11/23/2017  8:01 AM  MELD score: 26 at 11/23/2017  8:01 AM  Calculated from:  Serum Creatinine: 0.6 mg/dL (Rounded to 1) at 11/23/2017  8:01 AM  Serum Sodium: 121 mmol/L (Rounded to 125) at 11/23/2017  8:01 AM  Total Bilirubin: 21.3 mg/dL at 11/23/2017  5:30 AM  INR(ratio): 2.1 at 11/23/2017  5:30 AM  Age: 52 years

## 2017-11-23 NOTE — PLAN OF CARE
"Please call extension [phone] (if nobody answers, this will flip to a beeper, so put in your call back number) upon patient arrival to floor for Hospital Medicine admit team assignment and for additional admit orders for the patient.      Outside Transfer Acceptance Note     Patients name: Marcie Bazan  mrn: [id number]    Transferring Physician or Mid-Level provider/Clinic giving report:   Dr. Stevenson Gustafson, Hepatologist Dr. Souza, NP [place] at [facility]-s[facility] floor    Accepting Physician for admission to hospital: Arron Poole M.D.      Date of acceptance:  11/23/17     Reason for transfer:  Decompensated liver cirrhosis, worsening MELD, needs Hepatology evaluation: not transplant candidate.  Can go to any medicine team.    Report from Physician/Mid-Level Provider:  HPI:  As noted in Dr. Madrid's note, "Patient is a 51 yo  female with a history of decompensated alcoholic liver cirrhosis. She was transferred to the ED from inpatient rehab facility for worsening fatigue, malaise, lethargy, generalized weakness, and decreased appetite over the past 1 week. She is well known to the liver clinic and has been followed closely by Dr. Souza. She was recently discharged from [facility] for hyponatremia and worsening liver disease. She was previously a candidate for liver transplant but was denied secondary to alcohol abuse. She has been in inpatient rehab since last hospitalization in hopes to become a candidate for liver transplant again.    Since that time she has progressively worsened with elevating TB and MELD score.  Her Na has improved with fluid restriction.  She has been on no diuretics x 2 weeks.  She is on a steroid taper (prednisone).  She had a 2L paracentesis done yesterday which was negative for SBP.  Dr. Souza with Hepatology in  discussed the case with Dr. Fernandez here with Hepatology, who would like to re-evaluate the patient further here at [facility].    VS: Temp:  [98.1 °F (36.7 °C)] "   Pulse:  []   Resp:  [18]   BP: (110-115)/(57-60)   SpO2:  [98 %-100 %]     LABS:  Recent Results (from the past 24 hour(s))   Blood culture    Collection Time: 11/22/17  4:11 PM   Result Value Ref Range    Blood Culture, Routine No Growth to date    Blood culture    Collection Time: 11/22/17  4:11 PM   Result Value Ref Range    Blood Culture, Routine No Growth to date    Basic metabolic panel    Collection Time: 11/22/17  4:11 PM   Result Value Ref Range    Sodium 120 (L) 136 - 145 mmol/L    Potassium 3.5 3.5 - 5.1 mmol/L    Chloride 91 (L) 95 - 110 mmol/L    CO2 21 (L) 23 - 29 mmol/L    Glucose 139 (H) 70 - 110 mg/dL    BUN, Bld 9 6 - 20 mg/dL    Creatinine 0.6 0.5 - 1.4 mg/dL    Calcium 8.1 (L) 8.7 - 10.5 mg/dL    Anion Gap 8 8 - 16 mmol/L    eGFR if African American >60 >60 mL/min/1.73 m^2    eGFR if non African American >60 >60 mL/min/1.73 m^2   Basic metabolic panel    Collection Time: 11/22/17 11:16 PM   Result Value Ref Range    Sodium 120 (L) 136 - 145 mmol/L    Potassium 3.5 3.5 - 5.1 mmol/L    Chloride 90 (L) 95 - 110 mmol/L    CO2 21 (L) 23 - 29 mmol/L    Glucose 122 (H) 70 - 110 mg/dL    BUN, Bld 8 6 - 20 mg/dL    Creatinine 0.6 0.5 - 1.4 mg/dL    Calcium 8.2 (L) 8.7 - 10.5 mg/dL    Anion Gap 9 8 - 16 mmol/L    eGFR if African American >60 >60 mL/min/1.73 m^2    eGFR if non African American >60 >60 mL/min/1.73 m^2   CBC auto differential    Collection Time: 11/23/17  5:30 AM   Result Value Ref Range    WBC 11.15 3.90 - 12.70 K/uL    RBC 3.74 (L) 4.00 - 5.40 M/uL    Hemoglobin 11.1 (L) 12.0 - 16.0 g/dL    Hematocrit 29.7 (L) 37.0 - 48.5 %    MCV 79 (L) 82 - 98 fL    MCH 29.7 27.0 - 31.0 pg    MCHC 37.4 (H) 32.0 - 36.0 g/dL    RDW 24.5 (H) 11.5 - 14.5 %    Platelets 80 (L) 150 - 350 K/uL    MPV SEE COMMENT 9.2 - 12.9 fL    Gran # 7.6 1.8 - 7.7 K/uL    Lymph # 2.6 1.0 - 4.8 K/uL    Mono # 0.9 0.3 - 1.0 K/uL    Eos # 0.1 0.0 - 0.5 K/uL    Baso # 0.01 0.00 - 0.20 K/uL    Gran% 68.8 38.0 - 73.0 %     Lymph% 23.3 18.0 - 48.0 %    Mono% 7.7 4.0 - 15.0 %    Eosinophil% 0.5 0.0 - 8.0 %    Basophil% 0.1 0.0 - 1.9 %    Aniso Slight     Poik Moderate     Hypo Moderate     Target Cells Moderate     Tear Drop Cells Occasional     Stomatocytes Present     Differential Method Automated    Comprehensive metabolic panel    Collection Time: 11/23/17  5:30 AM   Result Value Ref Range    Sodium 119 (LL) 136 - 145 mmol/L    Potassium 3.4 (L) 3.5 - 5.1 mmol/L    Chloride 90 (L) 95 - 110 mmol/L    CO2 21 (L) 23 - 29 mmol/L    Glucose 98 70 - 110 mg/dL    BUN, Bld 10 6 - 20 mg/dL    Creatinine 0.6 0.5 - 1.4 mg/dL    Calcium 8.2 (L) 8.7 - 10.5 mg/dL    Total Protein 5.6 (L) 6.0 - 8.4 g/dL    Albumin 2.0 (L) 3.5 - 5.2 g/dL    Total Bilirubin 21.3 (H) 0.1 - 1.0 mg/dL    Alkaline Phosphatase 292 (H) 55 - 135 U/L     (H) 10 - 40 U/L     (H) 10 - 44 U/L    Anion Gap 8 8 - 16 mmol/L    eGFR if African American >60 >60 mL/min/1.73 m^2    eGFR if non African American >60 >60 mL/min/1.73 m^2   Protime-INR    Collection Time: 11/23/17  5:30 AM   Result Value Ref Range    Prothrombin Time 21.4 (H) 9.0 - 12.5 sec    INR 2.1 (H) 0.8 - 1.2   Basic metabolic panel    Collection Time: 11/23/17  8:01 AM   Result Value Ref Range    Sodium 121 (L) 136 - 145 mmol/L    Potassium 3.3 (L) 3.5 - 5.1 mmol/L    Chloride 91 (L) 95 - 110 mmol/L    CO2 22 (L) 23 - 29 mmol/L    Glucose 100 70 - 110 mg/dL    BUN, Bld 10 6 - 20 mg/dL    Creatinine 0.6 0.5 - 1.4 mg/dL    Calcium 8.4 (L) 8.7 - 10.5 mg/dL    Anion Gap 8 8 - 16 mmol/L    eGFR if African American >60 >60 mL/min/1.73 m^2    eGFR if non African American >60 >60 mL/min/1.73 m^2       RADIOGRAPHS:  US Abdomen Limited with Doppler (xpd) 11/22/2017 RUQ ultrasound R/O PVT/mass          RESULTS:  Limited abdominal ultrasound     History: Mass.  Portal vein thrombosis.     Comparison: 11/15/2017     Findings:     Cholecystectomy.  Common bile duct measures 4 mm.  Liver measures 14.8 cm and  demonstrates nodular contour compatible with cirrhosis.  No focal hepatic masses. There is moderate ascites.  Portal vein is patent with hepatopedal flow.  IMPRESSION:         Nodular liver contour compatible with cirrhosis.     Moderate ascites.     Patent portal vein.     Cholecystectomy.        Electronically signed by: CRYSTAL DESHPANDE MD  Date:                                            11/22/17  Time:                                           12:00           Signed By:  Crystal Deshpande MD on 11/22/2017 12:00 PM                To Do List upon arrival:  Consult hepatology and medically maximize her decompensated cirrhosis    Please call extension [phone] (if nobody answers, this will flip to a beeper, so put in your call back number) upon patient arrival to floor for Hospital Medicine admit team assignment and for additional admit orders for the patient.

## 2017-11-23 NOTE — SUBJECTIVE & OBJECTIVE
Interval History:  today. Ascitic analysis negative for SBP. Patient has required straight catheter.     Review of Systems   Constitutional: Positive for appetite change (decreased) and fatigue. Negative for chills, diaphoresis, fever and unexpected weight change.        Malaise   HENT: Negative for congestion, rhinorrhea, sinus pressure, sore throat and trouble swallowing.    Eyes: Negative for photophobia and visual disturbance.   Respiratory: Negative for apnea, cough, chest tightness, shortness of breath and wheezing.    Cardiovascular: Negative for chest pain, palpitations and leg swelling.   Gastrointestinal: Positive for abdominal distention. Negative for abdominal pain, blood in stool, constipation, diarrhea, nausea and vomiting.   Endocrine: Negative for polydipsia, polyphagia and polyuria.   Genitourinary: Negative for difficulty urinating, dysuria, frequency, hematuria and urgency.   Musculoskeletal: Positive for arthralgias. Negative for back pain, gait problem, joint swelling and myalgias.   Skin: Negative for pallor, rash and wound.   Neurological: Positive for weakness (generalized). Negative for dizziness, tremors, seizures, syncope, speech difficulty, light-headedness, numbness and headaches.   Psychiatric/Behavioral: Negative for agitation, confusion, hallucinations and sleep disturbance. The patient is not nervous/anxious.    All other systems reviewed and are negative.     Objective:     Vital Signs (Most Recent):  Temp: 98.1 °F (36.7 °C) (11/23/17 1220)  Pulse: 97 (11/23/17 1220)  Resp: 18 (11/23/17 1220)  BP: (!) 115/57 (11/23/17 1220)  SpO2: 98 % (11/23/17 1220) Vital Signs (24h Range):  Temp:  [97.8 °F (36.6 °C)-98.3 °F (36.8 °C)] 98.1 °F (36.7 °C)  Pulse:  [87-97] 97  Resp:  [18] 18  SpO2:  [97 %-100 %] 98 %  BP: (101-115)/(51-63) 115/57     Weight: 70 kg (154 lb 5.2 oz)  Body mass index is 25.68 kg/m².    Intake/Output Summary (Last 24 hours) at 11/23/17 1350  Last data filed at  11/23/17 0600   Gross per 24 hour   Intake              480 ml   Output              650 ml   Net             -170 ml      Physical Exam   Constitutional: She is oriented to person, place, and time. She appears well-developed. She has a sickly appearance.   HENT:   Head: Normocephalic.   Eyes: Pupils are equal, round, and reactive to light. Scleral icterus is present.   Neck: No thyromegaly present.   Cardiovascular: Normal rate and regular rhythm.  Exam reveals no friction rub.    Pulmonary/Chest: Effort normal and breath sounds normal. She has no wheezes. She exhibits no tenderness.   Abdominal: Soft. Bowel sounds are normal. She exhibits distension and ascites. There is no tenderness.   Musculoskeletal: She exhibits edema.   Trace LE edema.   Lymphadenopathy:     She has no cervical adenopathy.   Neurological: She is alert and oriented to person, place, and time.   Skin: Skin is warm and dry. No rash noted.   Psychiatric: She has a normal mood and affect.     Significant Labs:   BMP:   Recent Labs  Lab 11/23/17  0801      *   K 3.3*   CL 91*   CO2 22*   BUN 10   CREATININE 0.6   CALCIUM 8.4*     CBC:   Recent Labs  Lab 11/22/17  0555 11/23/17  0530   WBC 11.90 11.15   HGB 10.9* 11.1*   HCT 29.7* 29.7*   PLT 78* 80*       Significant Imaging:   Imaging Results          US Guided Paracentesis (Final result)  Result time 11/22/17 13:04:11    Final result by Romeo Guillory MD (11/22/17 13:04:11)                 Impression:     Successful paracentesis as above.      Electronically signed by: ROMEO GUILLORY MD  Date:     11/22/17  Time:    13:04              Narrative:    Procedure: Paracentesis    History: Ascites    Radiologist: Laurita Guillory    Procedure codes: 16619, 42079    Findings: Informed consent was obtained prior to the exam after discussion of risk and benefits of the procedure.  No sedation was administered.  Ultrasound demonstrated a moderate collection within the right lower quadrant.   The skin was prepped and draped under sterile conditions and 1% lidocaine was used for local anesthesia.  A Yueh centesis needle was used to access the fluid collection.  There is return of 2 L of fluid which was serous.  The catheter was subsequently removed and hemostasis was achieved.  The patient tolerated the procedure.                             US Abdomen Limited with Doppler (xpd) (Final result)  Result time 11/22/17 12:00:09    Final result by Crystal Deshpande MD (11/22/17 12:00:09)                 Impression:        Nodular liver contour compatible with cirrhosis.    Moderate ascites.    Patent portal vein.    Cholecystectomy.      Electronically signed by: CRYSTAL DESHPANDE MD  Date:     11/22/17  Time:    12:00              Narrative:    Limited abdominal ultrasound    History: Mass.  Portal vein thrombosis.    Comparison: 11/15/2017    Findings:    Cholecystectomy.  Common bile duct measures 4 mm.  Liver measures 14.8 cm and demonstrates nodular contour compatible with cirrhosis.  No focal hepatic masses. There is moderate ascites.  Portal vein is patent with hepatopedal flow.                             X-Ray Chest AP Portable (Final result)  Result time 11/20/17 17:55:44    Final result by Crystal Deshpande MD (11/20/17 17:55:44)                 Impression:     No acute cardiopulmonary disease.            Electronically signed by: CRYSTAL DESHPANDE MD  Date:     11/20/17  Time:    17:55              Narrative:    EXAM:   [id number] CHEST AP PORTABLE    CLINICAL HISTORY:  Chest Pain    COMPARISON: 10/19/2017    Findings:     The lungs are clear. The cardiac silhouette is within normal limits.

## 2017-11-23 NOTE — SUBJECTIVE & OBJECTIVE
Interval History:     11/22/17: Patient reports being fatigued.     11/23/17: Patient is watching TV, no complaints at present.           Review of patient's allergies indicates:   Allergen Reactions    Ferrous sulfate Other (See Comments)     Patient states the pill makes her sick. She stated she would rather have a shot     Current Facility-Administered Medications   Medication Frequency    cetirizine tablet 10 mg Daily    lactulose 20 gram/30 mL solution Soln 30 g TID    ondansetron disintegrating tablet 4 mg Q8H PRN    pantoprazole EC tablet 40 mg Daily    potassium chloride 10% solution 40 mEq Once    potassium chloride SA CR tablet 40 mEq Once    predniSONE tablet 20 mg Daily    ramelteon tablet 8 mg Nightly PRN       Objective:     Vital Signs (Most Recent):  Temp: 98.1 °F (36.7 °C) (11/23/17 1220)  Pulse: 97 (11/23/17 1220)  Resp: 18 (11/23/17 1220)  BP: (!) 115/57 (11/23/17 1220)  SpO2: 98 % (11/23/17 1220)  O2 Device (Oxygen Therapy): room air (11/23/17 1220) Vital Signs (24h Range):  Temp:  [97.8 °F (36.6 °C)-98.3 °F (36.8 °C)] 98.1 °F (36.7 °C)  Pulse:  [87-97] 97  Resp:  [18] 18  SpO2:  [97 %-100 %] 98 %  BP: (101-115)/(51-63) 115/57     Weight: 70 kg (154 lb 5.2 oz) (11/23/17 0348)  Body mass index is 25.68 kg/m².  Body surface area is 1.79 meters squared.    I/O last 3 completed shifts:  In: 840 [P.O.:840]  Out: 900 [Urine:900]    Physical Exam   Constitutional: She is oriented to person, place, and time. She appears well-developed and well-nourished.   HENT:   Head: Normocephalic.   Eyes: Pupils are equal, round, and reactive to light. Scleral icterus is present.   Neck: No thyromegaly present.   Cardiovascular: Normal rate and regular rhythm.  Exam reveals no friction rub.    Pulmonary/Chest: Effort normal and breath sounds normal. She has no wheezes. She exhibits no tenderness.   Abdominal: Soft. Bowel sounds are normal. She exhibits distension. There is no tenderness.   Musculoskeletal:  She exhibits edema.   Trace LE edema.   Lymphadenopathy:     She has no cervical adenopathy.   Neurological: She is alert and oriented to person, place, and time.   Skin: Skin is warm and dry. No rash noted.   Psychiatric: She has a normal mood and affect.       Significant Labs:  Lab Results   Component Value Date    CREATININE 0.6 11/23/2017    BUN 10 11/23/2017     (L) 11/23/2017    K 3.3 (L) 11/23/2017    CL 91 (L) 11/23/2017    CO2 22 (L) 11/23/2017     Lab Results   Component Value Date    CALCIUM 8.4 (L) 11/23/2017    PHOS 2.4 (L) 11/20/2017     Lab Results   Component Value Date    ALBUMIN 2.0 (L) 11/23/2017     Lab Results   Component Value Date    WBC 11.15 11/23/2017    HGB 11.1 (L) 11/23/2017    HCT 29.7 (L) 11/23/2017    MCV 79 (L) 11/23/2017    PLT 80 (L) 11/23/2017       Recent Labs  Lab 11/20/17  1724   MG 1.8         Significant Imaging:  Imaging Results          US Guided Paracentesis (Final result)  Result time 11/22/17 13:04:11    Final result by Romeo Guillory MD (11/22/17 13:04:11)                 Impression:     Successful paracentesis as above.      Electronically signed by: ROMEO GUILLORY MD  Date:     11/22/17  Time:    13:04              Narrative:    Procedure: Paracentesis    History: Ascites    Radiologist: Laurita Guillory    Procedure codes: 36783, 99917    Findings: Informed consent was obtained prior to the exam after discussion of risk and benefits of the procedure.  No sedation was administered.  Ultrasound demonstrated a moderate collection within the right lower quadrant.  The skin was prepped and draped under sterile conditions and 1% lidocaine was used for local anesthesia.  A Yueh centesis needle was used to access the fluid collection.  There is return of 2 L of fluid which was serous.  The catheter was subsequently removed and hemostasis was achieved.  The patient tolerated the procedure.                             US Abdomen Limited with Doppler (xpd) (Final result)   Result time 11/22/17 12:00:09    Final result by Crystal Deshpnade MD (11/22/17 12:00:09)                 Impression:        Nodular liver contour compatible with cirrhosis.    Moderate ascites.    Patent portal vein.    Cholecystectomy.      Electronically signed by: CRYSTAL DESHPANDE MD  Date:     11/22/17  Time:    12:00              Narrative:    Limited abdominal ultrasound    History: Mass.  Portal vein thrombosis.    Comparison: 11/15/2017    Findings:    Cholecystectomy.  Common bile duct measures 4 mm.  Liver measures 14.8 cm and demonstrates nodular contour compatible with cirrhosis.  No focal hepatic masses. There is moderate ascites.  Portal vein is patent with hepatopedal flow.                             X-Ray Chest AP Portable (Final result)  Result time 11/20/17 17:55:44    Final result by Crystal Deshpande MD (11/20/17 17:55:44)                 Impression:     No acute cardiopulmonary disease.            Electronically signed by: CRYSTAL DESHPANDE MD  Date:     11/20/17  Time:    17:55              Narrative:    EXAM:   [id number] CHEST AP PORTABLE    CLINICAL HISTORY:  Chest Pain    COMPARISON: 10/19/2017    Findings:     The lungs are clear. The cardiac silhouette is within normal limits.

## 2017-11-23 NOTE — PROGRESS NOTES
Report called to [facility] [phone] and this RN spoke with Tomeka, report given, no questions at this time, N.O. Mccleary to call for EMS transportation, pt going to room 906.  Will prepare pt for discharge.

## 2017-11-23 NOTE — PROGRESS NOTES
Pt bladder scanned >300 noted.PT denies any discomfort or pain at this time.  In and out cath performed removed 400 ml. Will continue to monitor.

## 2017-11-23 NOTE — ASSESSMENT & PLAN NOTE
Stable but still low, uncertain if prednisone could be making this worse  -Will start pred taper; dose decreased to 20mg today  -Continue on strict fluid restriction 1200cc, no free water  -Continue to hold diuretics

## 2017-11-24 LAB
AFP SERPL-MCNC: 9 NG/ML
ALBUMIN SERPL BCP-MCNC: 1.9 G/DL
ALP SERPL-CCNC: 321 U/L
ALT SERPL W/O P-5'-P-CCNC: 397 U/L
AMMONIA PLAS-SCNC: 93 UMOL/L
ANION GAP SERPL CALC-SCNC: 13 MMOL/L
ANION GAP SERPL CALC-SCNC: 8 MMOL/L
ANISOCYTOSIS BLD QL SMEAR: ABNORMAL
APTT BLDCRRT: 36.1 SEC
AST SERPL-CCNC: 284 U/L
BASOPHILS # BLD AUTO: 0.01 K/UL
BASOPHILS NFR BLD: 0.1 %
BILIRUB SERPL-MCNC: 22.7 MG/DL
BUN SERPL-MCNC: 10 MG/DL
BUN SERPL-MCNC: 10 MG/DL
BUN SERPL-MCNC: 11 MG/DL
BUN SERPL-MCNC: 16 MG/DL
BURR CELLS BLD QL SMEAR: ABNORMAL
CALCIUM SERPL-MCNC: 8 MG/DL
CALCIUM SERPL-MCNC: 8 MG/DL
CALCIUM SERPL-MCNC: 8.3 MG/DL
CALCIUM SERPL-MCNC: 8.4 MG/DL
CHLORIDE SERPL-SCNC: 87 MMOL/L
CHLORIDE SERPL-SCNC: 87 MMOL/L
CHLORIDE SERPL-SCNC: 88 MMOL/L
CHLORIDE SERPL-SCNC: 88 MMOL/L
CMV DNA SERPL NAA+PROBE-ACNC: 266 IU/ML
CO2 SERPL-SCNC: 20 MMOL/L
CO2 SERPL-SCNC: 22 MMOL/L
CO2 SERPL-SCNC: 22 MMOL/L
CO2 SERPL-SCNC: 23 MMOL/L
CREAT SERPL-MCNC: 0.6 MG/DL
CREAT SERPL-MCNC: 0.6 MG/DL
CREAT SERPL-MCNC: 0.7 MG/DL
CREAT SERPL-MCNC: 0.9 MG/DL
DIFFERENTIAL METHOD: ABNORMAL
EOSINOPHIL # BLD AUTO: 0.1 K/UL
EOSINOPHIL NFR BLD: 0.7 %
ERYTHROCYTE [DISTWIDTH] IN BLOOD BY AUTOMATED COUNT: 23.7 %
EST. GFR  (AFRICAN AMERICAN): >60 ML/MIN/1.73 M^2
EST. GFR  (NON AFRICAN AMERICAN): >60 ML/MIN/1.73 M^2
GIANT PLATELETS BLD QL SMEAR: PRESENT
GLUCOSE SERPL-MCNC: 124 MG/DL
GLUCOSE SERPL-MCNC: 71 MG/DL
GLUCOSE SERPL-MCNC: 93 MG/DL
GLUCOSE SERPL-MCNC: 93 MG/DL
HAV IGM SERPL QL IA: NEGATIVE
HBV CORE IGM SERPL QL IA: NEGATIVE
HBV SURFACE AG SERPL QL IA: NEGATIVE
HCT VFR BLD AUTO: 29.4 %
HCV AB SERPL QL IA: NEGATIVE
HGB BLD-MCNC: 11 G/DL
HYPOCHROMIA BLD QL SMEAR: ABNORMAL
IMM GRANULOCYTES # BLD AUTO: 0.09 K/UL
IMM GRANULOCYTES NFR BLD AUTO: 0.8 %
INR PPP: 2.2
LACTATE SERPL-SCNC: 1.9 MMOL/L
LYMPHOCYTES # BLD AUTO: 2.6 K/UL
LYMPHOCYTES NFR BLD: 23.1 %
MAGNESIUM SERPL-MCNC: 1.8 MG/DL
MCH RBC QN AUTO: 29.1 PG
MCHC RBC AUTO-ENTMCNC: 37.4 G/DL
MCV RBC AUTO: 78 FL
MONOCYTES # BLD AUTO: 1 K/UL
MONOCYTES NFR BLD: 8.9 %
NEUTROPHILS # BLD AUTO: 7.4 K/UL
NEUTROPHILS NFR BLD: 66.4 %
NRBC BLD-RTO: 0 /100 WBC
PHOSPHATE SERPL-MCNC: 2.5 MG/DL
PLATELET # BLD AUTO: 60 K/UL
PLATELET BLD QL SMEAR: ABNORMAL
PMV BLD AUTO: ABNORMAL FL
POIKILOCYTOSIS BLD QL SMEAR: ABNORMAL
POLYCHROMASIA BLD QL SMEAR: ABNORMAL
POTASSIUM SERPL-SCNC: 3.4 MMOL/L
POTASSIUM SERPL-SCNC: 3.4 MMOL/L
POTASSIUM SERPL-SCNC: 3.8 MMOL/L
POTASSIUM SERPL-SCNC: 3.9 MMOL/L
PROCALCITONIN SERPL IA-MCNC: 0.25 NG/ML
PROT SERPL-MCNC: 5.7 G/DL
PROTHROMBIN TIME: 22 SEC
RBC # BLD AUTO: 3.78 M/UL
SODIUM SERPL-SCNC: 117 MMOL/L
SODIUM SERPL-SCNC: 117 MMOL/L
SODIUM SERPL-SCNC: 119 MMOL/L
SODIUM SERPL-SCNC: 121 MMOL/L
TARGETS BLD QL SMEAR: ABNORMAL
WBC # BLD AUTO: 11.15 K/UL

## 2017-11-24 PROCEDURE — 80048 BASIC METABOLIC PNL TOTAL CA: CPT | Mod: 91

## 2017-11-24 PROCEDURE — 87077 CULTURE AEROBIC IDENTIFY: CPT

## 2017-11-24 PROCEDURE — 25000003 PHARM REV CODE 250: Performed by: STUDENT IN AN ORGANIZED HEALTH CARE EDUCATION/TRAINING PROGRAM

## 2017-11-24 PROCEDURE — 87088 URINE BACTERIA CULTURE: CPT

## 2017-11-24 PROCEDURE — 99233 SBSQ HOSP IP/OBS HIGH 50: CPT | Mod: ,,, | Performed by: INTERNAL MEDICINE

## 2017-11-24 PROCEDURE — 80321 ALCOHOLS BIOMARKERS 1OR 2: CPT

## 2017-11-24 PROCEDURE — 82105 ALPHA-FETOPROTEIN SERUM: CPT

## 2017-11-24 PROCEDURE — 85025 COMPLETE CBC W/AUTO DIFF WBC: CPT

## 2017-11-24 PROCEDURE — 82140 ASSAY OF AMMONIA: CPT

## 2017-11-24 PROCEDURE — 84145 PROCALCITONIN (PCT): CPT

## 2017-11-24 PROCEDURE — 87040 BLOOD CULTURE FOR BACTERIA: CPT

## 2017-11-24 PROCEDURE — 63600175 PHARM REV CODE 636 W HCPCS: Performed by: STUDENT IN AN ORGANIZED HEALTH CARE EDUCATION/TRAINING PROGRAM

## 2017-11-24 PROCEDURE — 83605 ASSAY OF LACTIC ACID: CPT

## 2017-11-24 PROCEDURE — 80053 COMPREHEN METABOLIC PANEL: CPT

## 2017-11-24 PROCEDURE — 36415 COLL VENOUS BLD VENIPUNCTURE: CPT

## 2017-11-24 PROCEDURE — 11000001 HC ACUTE MED/SURG PRIVATE ROOM

## 2017-11-24 PROCEDURE — 99233 SBSQ HOSP IP/OBS HIGH 50: CPT | Mod: ,,, | Performed by: HOSPITALIST

## 2017-11-24 PROCEDURE — 80074 ACUTE HEPATITIS PANEL: CPT

## 2017-11-24 PROCEDURE — 87186 SC STD MICRODIL/AGAR DIL: CPT

## 2017-11-24 PROCEDURE — 87086 URINE CULTURE/COLONY COUNT: CPT

## 2017-11-24 PROCEDURE — 87799 DETECT AGENT NOS DNA QUANT: CPT

## 2017-11-24 RX ORDER — LACTULOSE 10 G/15ML
200 SOLUTION ORAL; RECTAL 3 TIMES DAILY
Status: DISCONTINUED | OUTPATIENT
Start: 2017-11-24 | End: 2017-11-24

## 2017-11-24 RX ORDER — LACTULOSE 10 G/15ML
20 SOLUTION ORAL 3 TIMES DAILY
Status: DISCONTINUED | OUTPATIENT
Start: 2017-11-24 | End: 2017-12-11

## 2017-11-24 RX ORDER — LACTULOSE 10 G/15ML
20 SOLUTION ORAL 3 TIMES DAILY
Status: DISCONTINUED | OUTPATIENT
Start: 2017-11-24 | End: 2017-11-24

## 2017-11-24 RX ADMIN — PANTOPRAZOLE SODIUM 40 MG: 40 TABLET, DELAYED RELEASE ORAL at 08:11

## 2017-11-24 RX ADMIN — RIFAXIMIN 200 MG: 200 TABLET ORAL at 09:11

## 2017-11-24 RX ADMIN — LACTULOSE 200 G: 10 SOLUTION ORAL at 12:11

## 2017-11-24 RX ADMIN — CETIRIZINE HYDROCHLORIDE 10 MG: 10 TABLET, FILM COATED ORAL at 08:11

## 2017-11-24 RX ADMIN — LACTULOSE 20 G: 20 SOLUTION ORAL at 09:11

## 2017-11-24 RX ADMIN — PREDNISONE 20 MG: 20 TABLET ORAL at 08:11

## 2017-11-24 NOTE — SUBJECTIVE & OBJECTIVE
Past Medical History:   Diagnosis Date    Alcohol abuse     Alcoholic hepatitis     Anemia of chronic disease     Coagulopathy     End stage liver disease     Hypertension     Hyponatremia     Liver cirrhosis     Liver transplant candidate        Past Surgical History:   Procedure Laterality Date    BREAST SURGERY      CHOLECYSTECTOMY      HYSTERECTOMY         Review of patient's allergies indicates:   Allergen Reactions    Ferrous sulfate Other (See Comments)     Patient states the pill makes her sick. She stated she would rather have a shot       Current Facility-Administered Medications on File Prior to Encounter   Medication    [COMPLETED] potassium chloride 10% solution 40 mEq    [DISCONTINUED] cetirizine tablet 10 mg    [DISCONTINUED] lactulose 20 gram/30 mL solution Soln 30 g    [DISCONTINUED] ondansetron disintegrating tablet 4 mg    [DISCONTINUED] pantoprazole EC tablet 40 mg    [DISCONTINUED] potassium chloride SA CR tablet 40 mEq    [DISCONTINUED] predniSONE tablet 20 mg    [DISCONTINUED] ramelteon tablet 8 mg     Current Outpatient Prescriptions on File Prior to Encounter   Medication Sig    cetirizine (ZYRTEC) 10 MG tablet Take 10 mg by mouth once daily.    lactulose (CHRONULAC) 10 gram/15 mL solution TK 45 ML PO QID    lisinopril (PRINIVIL,ZESTRIL) 5 MG tablet Take 1 tablet by mouth only as needed for high blood pressure    omeprazole (PRILOSEC) 40 MG capsule Take 40 mg by mouth once daily.    ondansetron (ZOFRAN-ODT) 4 MG TbDL Take 1 tablet (4 mg total) by mouth every 8 (eight) hours as needed (nausea and vomiting).    potassium chloride SA (K-DUR,KLOR-CON) 20 MEQ tablet Taking only when taking lasix    predniSONE (DELTASONE) 20 MG tablet Take 2 tablets (40 mg total) by mouth once daily.    spironolactone (ALDACTONE) 100 MG tablet Hold until follow-up with GI     Family History     Problem Relation (Age of Onset)    Depression Maternal Grandfather    Diabetes Father,  Sister    Hypertension Mother, Father        Social History Main Topics    Smoking status: Never Smoker    Smokeless tobacco: Never Used    Alcohol use Yes      Comment: Currently admitted to inpatient rehab    Drug use: No    Sexual activity: Not Currently     Review of Systems   Constitutional: Positive for activity change, appetite change and fatigue. Negative for chills, fever and unexpected weight change.   HENT: Negative for mouth sores and trouble swallowing.    Eyes: Negative for visual disturbance.   Respiratory: Negative for cough and shortness of breath.    Cardiovascular: Negative for chest pain, palpitations and leg swelling.   Gastrointestinal: Positive for abdominal distention, diarrhea and nausea. Negative for abdominal pain, blood in stool, constipation and vomiting.   Genitourinary: Negative for decreased urine volume, difficulty urinating, dysuria, flank pain and hematuria.   Musculoskeletal: Negative for arthralgias and back pain.   Neurological: Positive for weakness (generalized). Negative for light-headedness and headaches.   Hematological: Does not bruise/bleed easily.   Psychiatric/Behavioral: Negative for confusion.     Objective:     Vital Signs (Most Recent):  Temp: 97.8 °F (36.6 °C) (11/23/17 2338)  Pulse: 89 (11/23/17 2338)  Resp: 14 (11/23/17 2338)  BP: (!) 111/56 (11/23/17 2338)  SpO2: 99 % (11/23/17 2338) Vital Signs (24h Range):  Temp:  [97.8 °F (36.6 °C)-98.2 °F (36.8 °C)] 97.8 °F (36.6 °C)  Pulse:  [] 89  Resp:  [14-18] 14  SpO2:  [97 %-100 %] 99 %  BP: (109-121)/(56-63) 111/56     Weight: 69.6 kg (153 lb 7 oz)  Body mass index is 25.53 kg/m².    Physical Exam   Constitutional: She is oriented to person, place, and time. No distress.   Muscle wasting   HENT:   Head: Normocephalic and atraumatic.   Eyes: EOM are normal. Pupils are equal, round, and reactive to light. Scleral icterus is present.   Neck: Normal range of motion.   Cardiovascular: Normal rate, regular rhythm  and normal heart sounds.    Pulmonary/Chest: Effort normal and breath sounds normal.   Abdominal: Soft. Bowel sounds are normal. She exhibits distension. There is no tenderness.   No asterixis   Musculoskeletal: She exhibits no edema.   Neurological: She is alert and oriented to person, place, and time.   Skin: Skin is warm and dry.   Psychiatric: She has a normal mood and affect. Her behavior is normal.        Significant Labs:   CBC:   Recent Labs  Lab 11/22/17  0555 11/23/17  0530   WBC 11.90 11.15   HGB 10.9* 11.1*   HCT 29.7* 29.7*   PLT 78* 80*     CMP:   Recent Labs  Lab 11/22/17  0555  11/23/17  0530 11/23/17  0801 11/23/17  1549 11/23/17  2221   *  < > 119* 121* 119* 119*   K 3.3*  < > 3.4* 3.3* 3.5 3.5   CL 90*  < > 90* 91* 89* 89*   CO2 21*  < > 21* 22* 22* 22*     < > 98 100 114* 126*   BUN 9  < > 10 10 11 11   CREATININE 0.6  < > 0.6 0.6 0.7 0.7   CALCIUM 8.2*  < > 8.2* 8.4* 8.4* 8.3*   PROT 5.8*  --  5.6*  --   --  5.7*   ALBUMIN 2.1*  --  2.0*  --   --  2.1*   BILITOT 21.1*  --  21.3*  --   --  23.7*   ALKPHOS 308*  --  292*  --   --  329*   *  --  306*  --   --  294*   *  --  431*  --   --  416*   ANIONGAP 9  < > 8 8 8 8   EGFRNONAA >60  < > >60 >60 >60 >60.0   < > = values in this interval not displayed.    Significant Imaging:     US 11/22  Nodular liver contour compatible with cirrhosis.    Moderate ascites.    Patent portal vein.    Cholecystectomy.    CT abd/pelv 10/20  Sequela of cirrhosis and portal hypertension including; large ascites, gastroesophageal varices, and probable portal colopathy.  Correlation with any right quadrant pain, differential for the cecal findings would include infectious or inflammatory colitis.    Approximately 1 cm focus of arterial enhancement within the inferior right hepatic lobe, with apparent washout, HCC not excluded although given heterogeneity of the hepatic parenchyma, this finding is indeterminate.  Please see above for additional  arterial enhancing foci.  Followup is advised.

## 2017-11-24 NOTE — ASSESSMENT & PLAN NOTE
"Follows with Dr. Souza in [place] for ESLD.     -see "decompensated hepatic cirrhosis" for plan  -will con't to optimize supportive care (nutrition, pain) and monitor for sequelae of liver failure    "

## 2017-11-24 NOTE — ASSESSMENT & PLAN NOTE
A 52 y.o female with alcoholic and PERKINS cirrhosis. Pt. Has hyponatremia with likely to be hypervolemic hyponatremia. Also, has hepatic encephalopathy.     Plan:   1. Diuresis to excrete free water.   2. Agree with treating  Hepatic encephalopathy with Lactulose and rifaximin.   3. Consider diagnostic and therapeutic paracentesis.   4. No need for ICU admission at this time. Please call for any worsening mental status.

## 2017-11-24 NOTE — CONSULTS
[facility]  Hepatology  Consult Note    Patient Name: Marcie Bazan  MRN: [id number]  Admission Date: 11/23/2017  Hospital Length of Stay: 1 days  Attending Provider: Chandra Campbell, *   Primary Care Physician: ODILIA Wall  Principal Problem:Decompensated hepatic cirrhosis    Inpatient consult to Hepatology  Consult performed by: NEGRITA SANTOYO  Consult ordered by: RAH GRANADOS        Subjective:     Transplant status: Pre-transplant    HPI:  52 year old female with PMHx of ESLD 2/2 EtOH hepatitis and essential HTN who presents to [facility] as a direct transfer from [facility] for evaluation of decompensated liver cirrhosis. Patient presented to [facility] with generalized weakness and was admitted to  11/10 for hyponatremia seen on labs in GI office. Discharged on 11/15 and returned on 11/20 with 1 week worsening weakness, malaise, lethargy, and decreased appetite. Denied fever/chills, abdominal pain, bright red or black tarry stools, hematemesis or other abnormal bleeding, confusion or disorientation, changes in urination, light-headedness, headache, changes in vision, dyspnea, chest pain or palpitations. Labs demonstrated Na 116 and MELD 32; patient admitted for hyponatremia. Nephrology consulted, recommended fluid restriction , holding diuretics, and prednisode taper that started 11/23 (decreased from 40mg QD to 20mg Qd.) Patient transferred to [facility] for revaluation of liver disease. Of note, paracentesis performed 11/22, removed 2L, negative for SBP.     Follows with Dr. Souza for cirrhosis. Since early 09/2017, patient's liver failure progressing indicated by worsening clinical signs (abdominal distention, weakness/fatigue) and MELD. She had been evaluated by [facility] liver transplant committee 10/25/2017 and declined transplant candidacy 2/2 continued EtOH use and lack of solid caregiver support/plan.She has since been enrolled in  "in-patient substance abuse program at [facility] where she has remained since early November; per patient, her last EtOH beverage was 11/01/2017.  Per chart review on 11/22/17:     "Patient's case was discussed in liver selection committee today. Per committee discussion, the SW will call patient's daughter to discuss caregiver plan at this time. If the caregiver plan is adequate, patient will need to follow up with either/both addiction psych or SW regarding substance abuse and plan. May need to do this inpatient if patient is still admitted."        Denies history of GI bleed, SBP, renal disease, or other complications of cirrhosis. Liver biopsy 10/24 demonstrated stage 4 cirrhosis. Records reports EGD in August 2016 that showed small esophageal varices with severe gastritis. Colonoscopy 2015 with 2 polyps and internal hemorrhoids Started on prednisone 40mg QD on 10/25; was on spironolactone, has been held since 11/10 for hypoNa; on lactulose.    Past Medical History:   Diagnosis Date    Alcohol abuse     Alcoholic hepatitis     Anemia of chronic disease     Coagulopathy     End stage liver disease     Hypertension     Hyponatremia     Liver cirrhosis     Liver transplant candidate        Past Surgical History:   Procedure Laterality Date    BREAST SURGERY      CHOLECYSTECTOMY      HYSTERECTOMY         Review of patient's allergies indicates:   Allergen Reactions    Ferrous sulfate Other (See Comments)     Patient states the pill makes her sick. She stated she would rather have a shot       Family History     Problem Relation (Age of Onset)    Depression Maternal Grandfather    Diabetes Father, Sister    Hypertension Mother, Father        Social History Main Topics    Smoking status: Never Smoker    Smokeless tobacco: Never Used    Alcohol use Yes      Comment: Currently admitted to inpatient rehab    Drug use: No    Sexual activity: Not Currently      Review of Systems   Unable to perform ROS: " Patient unresponsive   Gastrointestinal: Positive for abdominal pain.   Musculoskeletal: Positive for arthralgias and myalgias.   Psychiatric/Behavioral: Positive for decreased concentration.     Objective:     Vital Signs (Most Recent):  Temp: 97.5 °F (36.4 °C) (11/24/17 1605)  Pulse: 107 (11/24/17 1605)  Resp: 16 (11/24/17 1605)  BP: 109/62 (11/24/17 1605)  SpO2: 97 % (11/24/17 1605) Vital Signs (24h Range):  Temp:  [97.5 °F (36.4 °C)-98.4 °F (36.9 °C)] 97.5 °F (36.4 °C)  Pulse:  [] 107  Resp:  [14-18] 16  SpO2:  [97 %-99 %] 97 %  BP: (109-121)/(56-62) 109/62   Weight: 69.6 kg (153 lb 7 oz)  Body mass index is 25.53 kg/m².      Intake/Output Summary (Last 24 hours) at 11/24/17 1738  Last data filed at 11/24/17 1600   Gross per 24 hour   Intake               50 ml   Output                0 ml   Net               50 ml       Physical Exam  General: Well developed, well nourished female in NAD  HEENT: + scleral icterus; Oropharynx clear  Neck: No JVD noted, Supple  CV: Normal S1, S2 with no murmurs  Resp: Lungs CTA Bilaterally, Unlabored  Abdomen: distended and positive ascites. +ve BS and tenderness.   Extrem: No cyanosis, clubbing, edema.  Skin: No rashes, lesions, ulcers  Neuro: oriented to time, place and person. motor strength in tact. No focal deficit      Lines/Drains/Airways     Drain                 Rectal Tube 11/24/17 1300 less than 1 day          Peripheral Intravenous Line                 Peripheral IV - Single Lumen 11/21/17 1104 Left Forearm 3 days              Significant Labs:    CBC/Anemia Profile:    Recent Labs  Lab 11/23/17  0530 11/24/17  0410   WBC 11.15 11.15   HGB 11.1* 11.0*   HCT 29.7* 29.4*   PLT 80* 60*   MCV 79* 78*   RDW 24.5* 23.7*        Chemistries:    Recent Labs  Lab 11/23/17  0530  11/23/17  2221 11/23/17  2335 11/24/17  0410 11/24/17  1134   *  < > 119*  --  117*  117* 119*   K 3.4*  < > 3.5  --  3.4*  3.4* 3.8   CL 90*  < > 89*  --  87*  87* 88*   CO2 21*  < > 22*   --  22*  22* 23   BUN 10  < > 11  --  10  10 11   CREATININE 0.6  < > 0.7  --  0.6  0.6 0.7   CALCIUM 8.2*  < > 8.3*  --  8.0*  8.0* 8.3*   ALBUMIN 2.0*  --  2.1*  --  1.9*  --    PROT 5.6*  --  5.7*  --  5.7*  --    BILITOT 21.3*  --  23.7*  --  22.7*  --    ALKPHOS 292*  --  329*  --  321*  --    *  --  416*  --  397*  --    *  --  294*  --  284*  --    MG  --   --   --  1.8  --   --    PHOS  --   --   --  2.5*  --   --    < > = values in this interval not displayed.      Assessment/Plan:     * Decompensated hepatic cirrhosis    Recommend:    - Begin Prednisone Taper  - obtain addiction psych and social work consults  - Restart on lactulose therapy as soon as possible, titrate to 2-4 soft BMs/day  - Initiate Rifaximin therapy   - check CMP and INR at least BID   - Obtain procalcitonin   - check CMV and EBV PCR   - obtain rapid HIV   - limit sedating medications and would have frequent neuro checks   - Recommend Septic workup in this immunocompromised patient w/ initiation of empiric antibiotic therapy   - check sputum, urine, blood cultures   - Only give FFP or Platelets for invasive procedures or active bleeding   - place patient on PPI for stress ulcer prophylaxis   - consider transfer to ICU for higher level of care in this significantly hyponatremic patient w/ severe lethargy and cognitive disarray   - follow POCT glucose testing at least every 4-6 hours and more frequently if hypoglycemia occurs                 Thank you for your consult. I will follow-up with patient. Please contact us if you have any additional questions.    Magan Lazcano MD  Hepatology  [facility]

## 2017-11-24 NOTE — ASSESSMENT & PLAN NOTE
-on lisinopril outpatient  -will hold in setting of decompensated liver disease  -HD stable, will monitor

## 2017-11-24 NOTE — PLAN OF CARE
Problem: Patient Care Overview  Goal: Plan of Care Review  Outcome: Ongoing (interventions implemented as appropriate)  Pt AAOx4. VSS at this time. No complaints of pain or evident signs of distress noted. NPO for possible procedure. Hepatology consult ordered. Questions and concerns addressed. POC reviewed with pt who verbalized understanding.      Problem: Fall Risk (Adult)  Goal: Identify Related Risk Factors and Signs and Symptoms  Related risk factors and signs and symptoms are identified upon initiation of Human Response Clinical Practice Guideline (CPG)   Outcome: Ongoing (interventions implemented as appropriate)  Pt remained free of falls throughout the night. Safety precautions maintained. Pt verbalized understanding to call for assistance with OOB activities.

## 2017-11-24 NOTE — NURSING
EMS here to transfer patient to [facility]. Patient signed transfer forms. Transfer forms placed in chart. Cardiac monitor discontinued and returned to monitor tech. Patient taken on stretcher with all personal belongings.

## 2017-11-24 NOTE — ASSESSMENT & PLAN NOTE
Recommend:    - Begin Prednisone Taper  - obtain addiction psych and social work consults  - Restart on lactulose therapy as soon as possible, titrate to 2-4 soft BMs/day  - Initiate Rifaximin therapy   - check CMP and INR at least BID   - Obtain procalcitonin   - check CMV and EBV PCR   - obtain rapid HIV   - limit sedating medications and would have frequent neuro checks   - Recommend Septic workup in this immunocompromised patient w/ initiation of empiric antibiotic therapy   - check sputum, urine, blood cultures   - Only give FFP or Platelets for invasive procedures or active bleeding   - place patient on PPI for stress ulcer prophylaxis   - consider transfer to ICU for higher level of care in this significantly hyponatremic patient w/ severe lethargy and cognitive disarray   - follow POCT glucose testing at least every 4-6 hours and more frequently if hypoglycemia occurs

## 2017-11-24 NOTE — HPI
52 year old female with PMHx of 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 Patient presented to [facility] with generalized weakness. Onset two months ago, progressively worsening. Was admitted to  11/10 for hyponatremia seen on labs in GI office. Discharged on 11/15 and returned on 11/20 with 1 week worsening weakness, malaise, lethargy, and decreased appetite. Denied fever/chills, abdominal pain, bright red or black tarry stools, hematemesis or other abnormal bleeding, confusion or disorientation, changes in urination, light-headedness, headache, changes in vision, dyspnea, chest pain or palpitations. Labs demonstrated Na 116 and MELD 32; patient admitted for hyponatremia. Nephrology consulted, recommended fluid restriction , holding diuretics, and prednisode taper that started 11/23 (decreased from 40mg QD to 20mg Qd.) Patient transferred to [facility] for revaluation of liver disease. Of note, paracentesis performed 11/22, removed 2L, negative for SBP.    Follows with Dr. Souza for cirrhosis. Since early 09/2017, patient's liver failure progressing indicated by worsening clinical signs (abdominal distention, weakness/fatigue) and MELD. In mid-October, patient's PETH demonstrated EtOH use and she was denied candidacy for liver transplant. She was then enrolled in in-patient substance abuse program at [facility] where she has remained since early November; per patient, her last EtOH beverage was 11/01/2017. Denies history of GI bleed, SBP, renal disease, or other complications of cirrhosis. Liver biopsy 10/24 demonstrated stage 4 cirrhosis. Records reports EGD in August 2016 that showed small esophageal varices with severe gastritis. Colonoscopy 2015 with 2 polyps and internal hemorrhoids Started on prednisone 40mg QD on 10/25; was on spironolactone, has been held  since 11/10 for hypoNa; on lactulose.

## 2017-11-24 NOTE — HPI
"52 year old female with PMHx of ESLD 2/2 EtOH hepatitis and essential HTN who presents to [facility] as a direct transfer from [facility] for evaluation of decompensated liver cirrhosis. Patient presented to [facility] with generalized weakness and was admitted to  11/10 for hyponatremia seen on labs in GI office. Discharged on 11/15 and returned on 11/20 with 1 week worsening weakness, malaise, lethargy, and decreased appetite. Denied fever/chills, abdominal pain, bright red or black tarry stools, hematemesis or other abnormal bleeding, confusion or disorientation, changes in urination, light-headedness, headache, changes in vision, dyspnea, chest pain or palpitations. Labs demonstrated Na 116 and MELD 32; patient admitted for hyponatremia. Nephrology consulted, recommended fluid restriction , holding diuretics, and prednisode taper that started 11/23 (decreased from 40mg QD to 20mg Qd.) Patient transferred to [facility] for revaluation of liver disease. Of note, paracentesis performed 11/22, removed 2L, negative for SBP.     Follows with Dr. Souza for cirrhosis. Since early 09/2017, patient's liver failure progressing indicated by worsening clinical signs (abdominal distention, weakness/fatigue) and MELD. She had been [facility] 10/25/2017 and declined transplant candidacy 2/2 continued EtOH use and lack of solid caregiver support/plan.She has since been enrolled in in-patient substance abuse program at [facility] where she has remained since early November; per patient, her last EtOH beverage was 11/01/2017.  Per chart review on 11/22/17:     "Patient's case was discussed in liver selection committee today. Per committee discussion, the SW will call patient's daughter to discuss caregiver plan at this time. If the caregiver plan is adequate, patient will need to follow up with either/both addiction psych or SW regarding substance abuse and plan. May need to do " "this inpatient if patient is still admitted."        Denies history of GI bleed, SBP, renal disease, or other complications of cirrhosis. Liver biopsy 10/24 demonstrated stage 4 cirrhosis. Records reports EGD in August 2016 that showed small esophageal varices with severe gastritis. Colonoscopy 2015 with 2 polyps and internal hemorrhoids Started on prednisone 40mg QD on 10/25; was on spironolactone, has been held since 11/10 for hypoNa; on lactulose.  "

## 2017-11-24 NOTE — PROGRESS NOTES
Received order for a lactulose enema and suggested a fecal management system allow longer dwell time, order received for that as well. Per policy fecal tube inserted w minimal difficulty, balloon inflated appropriately and ordered lactulose w water dilution instilled. Patient roused during procedure and the rationale was explained to her. She verbalized understanding and will attempt to  be compliant w dwell time. Will continue to monitor

## 2017-11-24 NOTE — SUBJECTIVE & OBJECTIVE
Past Medical History:   Diagnosis Date    Alcohol abuse     Alcoholic hepatitis     Anemia of chronic disease     Coagulopathy     End stage liver disease     Hypertension     Hyponatremia     Liver cirrhosis     Liver transplant candidate        Past Surgical History:   Procedure Laterality Date    BREAST SURGERY      CHOLECYSTECTOMY      HYSTERECTOMY         Review of patient's allergies indicates:   Allergen Reactions    Ferrous sulfate Other (See Comments)     Patient states the pill makes her sick. She stated she would rather have a shot       Family History     Problem Relation (Age of Onset)    Depression Maternal Grandfather    Diabetes Father, Sister    Hypertension Mother, Father        Social History Main Topics    Smoking status: Never Smoker    Smokeless tobacco: Never Used    Alcohol use Yes      Comment: Currently admitted to inpatient rehab    Drug use: No    Sexual activity: Not Currently      Review of Systems   Unable to perform ROS: Mental status change     Objective:     Vital Signs (Most Recent):  Temp: 97.8 °F (36.6 °C) (11/24/17 1121)  Pulse: 93 (11/24/17 1121)  Resp: 16 (11/24/17 1121)  BP: 118/61 (11/24/17 1121)  SpO2: 98 % (11/24/17 1121) Vital Signs (24h Range):  Temp:  [97.8 °F (36.6 °C)-98.4 °F (36.9 °C)] 97.8 °F (36.6 °C)  Pulse:  [] 93  Resp:  [14-18] 16  SpO2:  [97 %-99 %] 98 %  BP: (111-121)/(56-63) 118/61   Weight: 69.6 kg (153 lb 7 oz)  Body mass index is 25.53 kg/m².    No intake or output data in the 24 hours ending 11/24/17 1509    Physical Exam  General: Well developed, well nourished female in NAD  HEENT: scleral icterus. Conjunctiva clear; Oropharynx clear  Neck: No JVD noted, Supple  CV: Normal S1, S2 with no murmurs  Resp: Lungs CTA Bilaterally, Unlabored  Abdomen: distended and positive ascites. +ve BS.   Extrem: No cyanosis, clubbing, edema.  Skin: No rashes, lesions, ulcers  Neuro: oriented to time, place and person. motor strength in tact. No  focal deficit      Lines/Drains/Airways     Peripheral Intravenous Line                 Peripheral IV - Single Lumen 11/21/17 1104 Left Forearm 3 days              Significant Labs:    CBC/Anemia Profile:    Recent Labs  Lab 11/23/17  0530 11/24/17  0410   WBC 11.15 11.15   HGB 11.1* 11.0*   HCT 29.7* 29.4*   PLT 80* 60*   MCV 79* 78*   RDW 24.5* 23.7*        Chemistries:    Recent Labs  Lab 11/23/17  0530  11/23/17  2221 11/23/17  2335 11/24/17  0410 11/24/17  1134   *  < > 119*  --  117*  117* 119*   K 3.4*  < > 3.5  --  3.4*  3.4* 3.8   CL 90*  < > 89*  --  87*  87* 88*   CO2 21*  < > 22*  --  22*  22* 23   BUN 10  < > 11  --  10  10 11   CREATININE 0.6  < > 0.7  --  0.6  0.6 0.7   CALCIUM 8.2*  < > 8.3*  --  8.0*  8.0* 8.3*   ALBUMIN 2.0*  --  2.1*  --  1.9*  --    PROT 5.6*  --  5.7*  --  5.7*  --    BILITOT 21.3*  --  23.7*  --  22.7*  --    ALKPHOS 292*  --  329*  --  321*  --    *  --  416*  --  397*  --    *  --  294*  --  284*  --    MG  --   --   --  1.8  --   --    PHOS  --   --   --  2.5*  --   --    < > = values in this interval not displayed.

## 2017-11-24 NOTE — HPI
52 year old female with PMHx of 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 Patient presented to [facility] with generalized weakness. Onset two months ago, progressively worsening. Was admitted to  11/10 for hyponatremia seen on labs in GI office. Discharged on 11/15 and returned on 11/20 with 1 week worsening weakness, malaise, lethargy, and decreased appetite. Denied fever/chills, abdominal pain, bright red or black tarry stools, hematemesis or other abnormal bleeding, confusion or disorientation, changes in urination, light-headedness, headache, changes in vision, dyspnea, chest pain or palpitations. Labs demonstrated Na 116 and MELD 32; patient admitted for hyponatremia. Nephrology consulted, recommended fluid restriction , holding diuretics, and prednisode taper that started 11/23 (decreased from 40mg QD to 20mg Qd.) Patient transferred to [facility] for revaluation of liver disease. Of note, paracentesis performed 11/22, removed 2L, negative for SBP.     Follows with Dr. Souza for cirrhosis. Since early 09/2017, patient's liver failure progressing indicated by worsening clinical signs (abdominal distention, weakness/fatigue) and MELD. In mid-October, patient's PETH demonstrated EtOH use and she was denied candidacy for liver transplant. She was then enrolled in in-patient substance abuse program at [facility] where she has remained since early November; per patient, her last EtOH beverage was 11/01/2017. Denies history of GI bleed, SBP, renal disease, or other complications of cirrhosis. Liver biopsy 10/24 demonstrated stage 4 cirrhosis. Records reports EGD in August 2016 that showed small esophageal varices with severe gastritis. Colonoscopy 2015 with 2 polyps and internal hemorrhoids Started on prednisone 40mg QD on 10/25; was on spironolactone, has been held  since 11/10 for hypoNa; on lactulose.    ICU consulted for AMS and Hyponatremia. During my interview, pt. Is alert, oriented to time, place and person. Denies abdominal pain, nausea or vomiting.

## 2017-11-24 NOTE — ASSESSMENT & PLAN NOTE
Ms. Bazan is a 51 yo female with cirrhosis & ESLD likely secondary to EtOH who presents with progressive liver failure evinced by increasing MELD & hyponatremia. MELD 32, Na 119 today (from 116 three days ago.) Patient was transferred for further evaluation by hepatology. Admitted to hospital medicine to monitor.    -hepatology consulted  -coagulopathy-see plan below  -renal function normal (Cr 0.7)  -no signs of SBP-denies abd pain, afebrile, no leukocytosis, negative ascitic fluid on 11/22  -ascites-see plan below  -NPO pending possible procedure tomorrow  -lactate 2.3; will trend with AM lab  -Nh3 50; not signs of asterixis or confusion; holding lactulose for now

## 2017-11-24 NOTE — SUBJECTIVE & OBJECTIVE
Past Medical History:   Diagnosis Date    Alcohol abuse     Alcoholic hepatitis     Anemia of chronic disease     Coagulopathy     End stage liver disease     Hypertension     Hyponatremia     Liver cirrhosis     Liver transplant candidate        Past Surgical History:   Procedure Laterality Date    BREAST SURGERY      CHOLECYSTECTOMY      HYSTERECTOMY         Review of patient's allergies indicates:   Allergen Reactions    Ferrous sulfate Other (See Comments)     Patient states the pill makes her sick. She stated she would rather have a shot       Family History     Problem Relation (Age of Onset)    Depression Maternal Grandfather    Diabetes Father, Sister    Hypertension Mother, Father        Social History Main Topics    Smoking status: Never Smoker    Smokeless tobacco: Never Used    Alcohol use Yes      Comment: Currently admitted to inpatient rehab    Drug use: No    Sexual activity: Not Currently      Review of Systems   Unable to perform ROS: Patient unresponsive   Gastrointestinal: Positive for abdominal pain.   Musculoskeletal: Positive for arthralgias and myalgias.   Psychiatric/Behavioral: Positive for decreased concentration.     Objective:     Vital Signs (Most Recent):  Temp: 97.5 °F (36.4 °C) (11/24/17 1605)  Pulse: 107 (11/24/17 1605)  Resp: 16 (11/24/17 1605)  BP: 109/62 (11/24/17 1605)  SpO2: 97 % (11/24/17 1605) Vital Signs (24h Range):  Temp:  [97.5 °F (36.4 °C)-98.4 °F (36.9 °C)] 97.5 °F (36.4 °C)  Pulse:  [] 107  Resp:  [14-18] 16  SpO2:  [97 %-99 %] 97 %  BP: (109-121)/(56-62) 109/62   Weight: 69.6 kg (153 lb 7 oz)  Body mass index is 25.53 kg/m².      Intake/Output Summary (Last 24 hours) at 11/24/17 8448  Last data filed at 11/24/17 1600   Gross per 24 hour   Intake               50 ml   Output                0 ml   Net               50 ml       Physical Exam  General: Well developed, well nourished female in NAD  HEENT: + scleral icterus; Oropharynx clear  Neck:  No JVD noted, Supple  CV: Normal S1, S2 with no murmurs  Resp: Lungs CTA Bilaterally, Unlabored  Abdomen: distended and positive ascites. +ve BS and tenderness.   Extrem: No cyanosis, clubbing, edema.  Skin: No rashes, lesions, ulcers  Neuro: oriented to time, place and person. motor strength in tact. No focal deficit      Lines/Drains/Airways     Drain                 Rectal Tube 11/24/17 1300 less than 1 day          Peripheral Intravenous Line                 Peripheral IV - Single Lumen 11/21/17 1104 Left Forearm 3 days              Significant Labs:    CBC/Anemia Profile:    Recent Labs  Lab 11/23/17  0530 11/24/17  0410   WBC 11.15 11.15   HGB 11.1* 11.0*   HCT 29.7* 29.4*   PLT 80* 60*   MCV 79* 78*   RDW 24.5* 23.7*        Chemistries:    Recent Labs  Lab 11/23/17  0530  11/23/17  2221 11/23/17  2335 11/24/17  0410 11/24/17  1134   *  < > 119*  --  117*  117* 119*   K 3.4*  < > 3.5  --  3.4*  3.4* 3.8   CL 90*  < > 89*  --  87*  87* 88*   CO2 21*  < > 22*  --  22*  22* 23   BUN 10  < > 11  --  10  10 11   CREATININE 0.6  < > 0.7  --  0.6  0.6 0.7   CALCIUM 8.2*  < > 8.3*  --  8.0*  8.0* 8.3*   ALBUMIN 2.0*  --  2.1*  --  1.9*  --    PROT 5.6*  --  5.7*  --  5.7*  --    BILITOT 21.3*  --  23.7*  --  22.7*  --    ALKPHOS 292*  --  329*  --  321*  --    *  --  416*  --  397*  --    *  --  294*  --  284*  --    MG  --   --   --  1.8  --   --    PHOS  --   --   --  2.5*  --   --    < > = values in this interval not displayed.

## 2017-11-24 NOTE — CONSULTS
[facility]  Critical Care Medicine  Consult Note    Patient Name: Marcie Bazan  MRN: [id number]  Admission Date: 11/23/2017  Hospital Length of Stay: 1 days  Code Status: Full Code  Attending Physician: Chandra Campbell, *   Primary Care Provider: ODILIA Wall   Principal Problem: Decompensated hepatic cirrhosis    Inpatient consult to Critical Care Medicine  Consult performed by: YUN TAI  Consult ordered by: LANIE LO  Reason for consult: hyponatremia and AMS         Subjective:     HPI:  52 year old female with PMHx of 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 Patient presented to [facility] with generalized weakness. Onset two months ago, progressively worsening. Was admitted to  11/10 for hyponatremia seen on labs in GI office. Discharged on 11/15 and returned on 11/20 with 1 week worsening weakness, malaise, lethargy, and decreased appetite. Denied fever/chills, abdominal pain, bright red or black tarry stools, hematemesis or other abnormal bleeding, confusion or disorientation, changes in urination, light-headedness, headache, changes in vision, dyspnea, chest pain or palpitations. Labs demonstrated Na 116 and MELD 32; patient admitted for hyponatremia. Nephrology consulted, recommended fluid restriction , holding diuretics, and prednisode taper that started 11/23 (decreased from 40mg QD to 20mg Qd.) Patient transferred to [facility] for revaluation of liver disease. Of note, paracentesis performed 11/22, removed 2L, negative for SBP.     Follows with Dr. Souza for cirrhosis. Since early 09/2017, patient's liver failure progressing indicated by worsening clinical signs (abdominal distention, weakness/fatigue) and MELD. In mid-October, patient's PETH demonstrated EtOH use and she was denied  candidacy for liver transplant. She was then enrolled in in-patient substance abuse program at [facility] where she has remained since early November; per patient, her last EtOH beverage was 11/01/2017. Denies history of GI bleed, SBP, renal disease, or other complications of cirrhosis. Liver biopsy 10/24 demonstrated stage 4 cirrhosis. Records reports EGD in August 2016 that showed small esophageal varices with severe gastritis. Colonoscopy 2015 with 2 polyps and internal hemorrhoids Started on prednisone 40mg QD on 10/25; was on spironolactone, has been held since 11/10 for hypoNa; on lactulose.    ICU consulted for AMS and Hyponatremia. During my interview, pt. Is alert, oriented to time, place and person. Denies abdominal pain, nausea or vomiting.     Hospital/ICU Course:  No notes on file    Past Medical History:   Diagnosis Date    Alcohol abuse     Alcoholic hepatitis     Anemia of chronic disease     Coagulopathy     End stage liver disease     Hypertension     Hyponatremia     Liver cirrhosis     Liver transplant candidate        Past Surgical History:   Procedure Laterality Date    BREAST SURGERY      CHOLECYSTECTOMY      HYSTERECTOMY         Review of patient's allergies indicates:   Allergen Reactions    Ferrous sulfate Other (See Comments)     Patient states the pill makes her sick. She stated she would rather have a shot       Family History     Problem Relation (Age of Onset)    Depression Maternal Grandfather    Diabetes Father, Sister    Hypertension Mother, Father        Social History Main Topics    Smoking status: Never Smoker    Smokeless tobacco: Never Used    Alcohol use Yes      Comment: Currently admitted to inpatient rehab    Drug use: No    Sexual activity: Not Currently      Review of Systems   Unable to perform ROS: Mental status change     Objective:     Vital Signs (Most Recent):  Temp: 97.8 °F (36.6 °C) (11/24/17 1121)  Pulse: 93 (11/24/17 1121)  Resp: 16 (11/24/17  1121)  BP: 118/61 (11/24/17 1121)  SpO2: 98 % (11/24/17 1121) Vital Signs (24h Range):  Temp:  [97.8 °F (36.6 °C)-98.4 °F (36.9 °C)] 97.8 °F (36.6 °C)  Pulse:  [] 93  Resp:  [14-18] 16  SpO2:  [97 %-99 %] 98 %  BP: (111-121)/(56-63) 118/61   Weight: 69.6 kg (153 lb 7 oz)  Body mass index is 25.53 kg/m².    No intake or output data in the 24 hours ending 11/24/17 1509    Physical Exam  General: Well developed, well nourished female in NAD  HEENT: scleral icterus. Conjunctiva clear; Oropharynx clear  Neck: No JVD noted, Supple  CV: Normal S1, S2 with no murmurs  Resp: Lungs CTA Bilaterally, Unlabored  Abdomen: distended and positive ascites. +ve BS.   Extrem: No cyanosis, clubbing, edema.  Skin: No rashes, lesions, ulcers  Neuro: oriented to time, place and person. motor strength in tact. No focal deficit      Lines/Drains/Airways     Peripheral Intravenous Line                 Peripheral IV - Single Lumen 11/21/17 1104 Left Forearm 3 days              Significant Labs:    CBC/Anemia Profile:    Recent Labs  Lab 11/23/17  0530 11/24/17  0410   WBC 11.15 11.15   HGB 11.1* 11.0*   HCT 29.7* 29.4*   PLT 80* 60*   MCV 79* 78*   RDW 24.5* 23.7*        Chemistries:    Recent Labs  Lab 11/23/17  0530  11/23/17  2221 11/23/17  2335 11/24/17  0410 11/24/17  1134   *  < > 119*  --  117*  117* 119*   K 3.4*  < > 3.5  --  3.4*  3.4* 3.8   CL 90*  < > 89*  --  87*  87* 88*   CO2 21*  < > 22*  --  22*  22* 23   BUN 10  < > 11  --  10  10 11   CREATININE 0.6  < > 0.7  --  0.6  0.6 0.7   CALCIUM 8.2*  < > 8.3*  --  8.0*  8.0* 8.3*   ALBUMIN 2.0*  --  2.1*  --  1.9*  --    PROT 5.6*  --  5.7*  --  5.7*  --    BILITOT 21.3*  --  23.7*  --  22.7*  --    ALKPHOS 292*  --  329*  --  321*  --    *  --  416*  --  397*  --    *  --  294*  --  284*  --    MG  --   --   --  1.8  --   --    PHOS  --   --   --  2.5*  --   --    < > = values in this interval not displayed.      Assessment/Plan:     Renal/    Hyponatremia    A 52 y.o female with alcoholic and PERKINS cirrhosis. Pt. Has hyponatremia which likely to be hypervolemic hyponatremia. Also, has hepatic encephalopathy.     Plan:   1. Diuresis to excrete free water.   2. Agree with treating  Hepatic encephalopathy with Lactulose and rifaximin.   3. Consider diagnostic and therapeutic paracentesis.   4. No need for ICU admission at this time. Please call for any worsening mental status.                Case discussed with ICU staff PEDRO Aguilar  Thank you for your consult. I will sign off. Please contact us if you have any additional questions.     Benoit Aguayo MD  Critical Care Medicine  [facility]

## 2017-11-24 NOTE — H&P
[facility] Hospital Medicine  History & Physical    Patient Name: Marcie Bazan  MRN: [id number]  Admission Date: 11/23/2017  Attending Physician: Chandra Campbell, *   Primary Care Provider: Milton Ferrer, [facility] Medicine Team: [facility]P MED 2 Pretty Mike MD     Patient information was obtained from patient, past medical records and ER records.     Subjective:     Principal Problem:Decompensated hepatic cirrhosis    Chief Complaint: No chief complaint on file.       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 Patient presented to [facility] with generalized weakness. Onset two months ago, progressively worsening. Was admitted to  11/10 for hyponatremia seen on labs in GI office. Discharged on 11/15 and returned on 11/20 with 1 week worsening weakness, malaise, lethargy, and decreased appetite. Denied fever/chills, abdominal pain, bright red or black tarry stools, hematemesis or other abnormal bleeding, confusion or disorientation, changes in urination, light-headedness, headache, changes in vision, dyspnea, chest pain or palpitations. Labs demonstrated Na 116 and MELD 32; patient admitted for hyponatremia. Nephrology consulted, recommended fluid restriction , holding diuretics, and prednisode taper that started 11/23 (decreased from 40mg QD to 20mg Qd.) Patient transferred to [facility] for revaluation of liver disease. Of note, paracentesis performed 11/22, removed 2L, negative for SBP.    Follows with Dr. Souza for cirrhosis. Since early 09/2017, patient's liver failure progressing indicated by worsening clinical signs (abdominal distention, weakness/fatigue) and MELD. In mid-October, patient's PETH demonstrated EtOH use and she was denied candidacy for liver transplant. She was  then enrolled in in-patient substance abuse program at [facility] where she has remained since early November; per patient, her last EtOH beverage was 11/01/2017. Denies history of GI bleed, SBP, renal disease, or other complications of cirrhosis. Liver biopsy 10/24 demonstrated stage 4 cirrhosis. Records reports EGD in August 2016 that showed small esophageal varices with severe gastritis. Colonoscopy 2015 with 2 polyps and internal hemorrhoids Started on prednisone 40mg QD on 10/25; was on spironolactone, has been held since 11/10 for hypoNa; on lactulose.    Past Medical History:   Diagnosis Date    Alcohol abuse     Alcoholic hepatitis     Anemia of chronic disease     Coagulopathy     End stage liver disease     Hypertension     Hyponatremia     Liver cirrhosis     Liver transplant candidate        Past Surgical History:   Procedure Laterality Date    BREAST SURGERY      CHOLECYSTECTOMY      HYSTERECTOMY         Review of patient's allergies indicates:   Allergen Reactions    Ferrous sulfate Other (See Comments)     Patient states the pill makes her sick. She stated she would rather have a shot       Current Facility-Administered Medications on File Prior to Encounter   Medication    [COMPLETED] potassium chloride 10% solution 40 mEq    [DISCONTINUED] cetirizine tablet 10 mg    [DISCONTINUED] lactulose 20 gram/30 mL solution Soln 30 g    [DISCONTINUED] ondansetron disintegrating tablet 4 mg    [DISCONTINUED] pantoprazole EC tablet 40 mg    [DISCONTINUED] potassium chloride SA CR tablet 40 mEq    [DISCONTINUED] predniSONE tablet 20 mg    [DISCONTINUED] ramelteon tablet 8 mg     Current Outpatient Prescriptions on File Prior to Encounter   Medication Sig    cetirizine (ZYRTEC) 10 MG tablet Take 10 mg by mouth once daily.    lactulose (CHRONULAC) 10 gram/15 mL solution TK 45 ML PO QID    lisinopril (PRINIVIL,ZESTRIL) 5 MG tablet Take 1 tablet by mouth only as needed for high blood pressure     omeprazole (PRILOSEC) 40 MG capsule Take 40 mg by mouth once daily.    ondansetron (ZOFRAN-ODT) 4 MG TbDL Take 1 tablet (4 mg total) by mouth every 8 (eight) hours as needed (nausea and vomiting).    potassium chloride SA (K-DUR,KLOR-CON) 20 MEQ tablet Taking only when taking lasix    predniSONE (DELTASONE) 20 MG tablet Take 2 tablets (40 mg total) by mouth once daily.    spironolactone (ALDACTONE) 100 MG tablet Hold until follow-up with GI     Family History     Problem Relation (Age of Onset)    Depression Maternal Grandfather    Diabetes Father, Sister    Hypertension Mother, Father        Social History Main Topics    Smoking status: Never Smoker    Smokeless tobacco: Never Used    Alcohol use Yes      Comment: Currently admitted to inpatient rehab    Drug use: No    Sexual activity: Not Currently     Review of Systems   Constitutional: Positive for activity change, appetite change and fatigue. Negative for chills, fever and unexpected weight change.   HENT: Negative for mouth sores and trouble swallowing.    Eyes: Negative for visual disturbance.   Respiratory: Negative for cough and shortness of breath.    Cardiovascular: Negative for chest pain, palpitations and leg swelling.   Gastrointestinal: Positive for abdominal distention, diarrhea and nausea. Negative for abdominal pain, blood in stool, constipation and vomiting.   Genitourinary: Negative for decreased urine volume, difficulty urinating, dysuria, flank pain and hematuria.   Musculoskeletal: Negative for arthralgias and back pain.   Neurological: Positive for weakness (generalized). Negative for light-headedness and headaches.   Hematological: Does not bruise/bleed easily.   Psychiatric/Behavioral: Negative for confusion.     Objective:     Vital Signs (Most Recent):  Temp: 97.8 °F (36.6 °C) (11/23/17 2338)  Pulse: 89 (11/23/17 2338)  Resp: 14 (11/23/17 2338)  BP: (!) 111/56 (11/23/17 2338)  SpO2: 99 % (11/23/17 2338) Vital Signs (24h  Range):  Temp:  [97.8 °F (36.6 °C)-98.2 °F (36.8 °C)] 97.8 °F (36.6 °C)  Pulse:  [] 89  Resp:  [14-18] 14  SpO2:  [97 %-100 %] 99 %  BP: (109-121)/(56-63) 111/56     Weight: 69.6 kg (153 lb 7 oz)  Body mass index is 25.53 kg/m².    Physical Exam   Constitutional: She is oriented to person, place, and time. No distress.   Muscle wasting   HENT:   Head: Normocephalic and atraumatic.   Eyes: EOM are normal. Pupils are equal, round, and reactive to light. Scleral icterus is present.   Neck: Normal range of motion.   Cardiovascular: Normal rate, regular rhythm and normal heart sounds.    Pulmonary/Chest: Effort normal and breath sounds normal.   Abdominal: Soft. Bowel sounds are normal. She exhibits distension. There is no tenderness.   No asterixis   Musculoskeletal: She exhibits no edema.   Neurological: She is alert and oriented to person, place, and time.   Skin: Skin is warm and dry.   Psychiatric: She has a normal mood and affect. Her behavior is normal.        Significant Labs:   CBC:   Recent Labs  Lab 11/22/17  0555 11/23/17  0530   WBC 11.90 11.15   HGB 10.9* 11.1*   HCT 29.7* 29.7*   PLT 78* 80*     CMP:   Recent Labs  Lab 11/22/17  0555  11/23/17  0530 11/23/17  0801 11/23/17  1549 11/23/17  2221   *  < > 119* 121* 119* 119*   K 3.3*  < > 3.4* 3.3* 3.5 3.5   CL 90*  < > 90* 91* 89* 89*   CO2 21*  < > 21* 22* 22* 22*     < > 98 100 114* 126*   BUN 9  < > 10 10 11 11   CREATININE 0.6  < > 0.6 0.6 0.7 0.7   CALCIUM 8.2*  < > 8.2* 8.4* 8.4* 8.3*   PROT 5.8*  --  5.6*  --   --  5.7*   ALBUMIN 2.1*  --  2.0*  --   --  2.1*   BILITOT 21.1*  --  21.3*  --   --  23.7*   ALKPHOS 308*  --  292*  --   --  329*   *  --  306*  --   --  294*   *  --  431*  --   --  416*   ANIONGAP 9  < > 8 8 8 8   EGFRNONAA >60  < > >60 >60 >60 >60.0   < > = values in this interval not displayed.    Significant Imaging:     US 11/22  Nodular liver contour compatible with cirrhosis.    Moderate  "ascites.    Patent portal vein.    Cholecystectomy.    CT abd/pelv 10/20  Sequela of cirrhosis and portal hypertension including; large ascites, gastroesophageal varices, and probable portal colopathy.  Correlation with any right quadrant pain, differential for the cecal findings would include infectious or inflammatory colitis.    Approximately 1 cm focus of arterial enhancement within the inferior right hepatic lobe, with apparent washout, HCC not excluded although given heterogeneity of the hepatic parenchyma, this finding is indeterminate.  Please see above for additional arterial enhancing foci.  Followup is advised.    Assessment/Plan:     * Decompensated hepatic cirrhosis    Ms. Bazan is a 53 yo female with cirrhosis & ESLD likely secondary to EtOH who presents with progressive liver failure evinced by increasing MELD & hyponatremia. MELD 32, Na 119 today (from 116 three days ago.) Patient was transferred for further evaluation by hepatology. Admitted to hospital medicine to monitor.    -hepatology consulted  -coagulopathy-see plan below  -renal function normal (Cr 0.7)  -no signs of SBP-denies abd pain, afebrile, no leukocytosis, negative ascitic fluid on 11/22  -ascites-see plan below  -NPO pending possible procedure tomorrow  -lactate 2.3; will trend with AM lab  -Nh3 50; not signs of asterixis or confusion; holding lactulose for now        End stage liver disease    Follows with Dr. Souza in  for ESLD.     -see "decompensated hepatic cirrhosis" for plan  -will con't to optimize supportive care (nutrition, pain) and monitor for sequelae of liver failure          Ascites    Secondary to liver disease    -Was previously on diuretics, now being held in the setting of hyponatremia  -s/p paracentesis 11/22: 2L removed, SBP negative  -will monitor temperature & CBC for signs of infection              Coagulopathy    Secondary to liver disease.  -INR 2.1; platelets 80  -denies s/s of bleed  -H/H stable; "     Plan:  -will monitor with daily PT/INR, CBC  -holding DVT ppx          Essential hypertension    -on lisinopril outpatient  -will hold in setting of decompensated liver disease  -HD stable, will monitor          Anemia of chronic disease    -Hb 11.1; stable  -will monitor with CBC            VTE Risk Mitigation         Ordered     Place FABIOLA hose  Until discontinued      11/23/17 2305     Medium Risk of VTE  Once      11/23/17 2305        Diet-NPO for now pending possible procedure tomorrow  DVT- FABIOLA hose; holding anti-coagulation given ESLD-induced coagulopathy  Dispo-pending evaluation of decompensated hepatic cirrhosis    Pretty Mike MD  Department of Hospital Medicine   [facility]

## 2017-11-24 NOTE — PLAN OF CARE
Problem: Patient Care Overview  Goal: Plan of Care Review  Outcome: Ongoing (interventions implemented as appropriate)  Pt stable. Pt is NSR/ST on the heart monitor.  Pt had no complaints of pain.  Pt voided once via toilet.  Pt to be transferred to [facility] for further liver tx care; awaiting ems  now, family aware of transfer tonight.  Plan of care reviewed.  Pt verbalizes understanding.  Pt was free from falls or injuries during duration of shift.  Pt turned and repositioned self.  PIV intact with no redness, swelling or drainage.  Bed low, wheels locked, bed alarm on, call light in reach.  Pt instructed to call for assistance.  Will continue to monitor.

## 2017-11-24 NOTE — ASSESSMENT & PLAN NOTE
Secondary to liver disease    -Was previously on diuretics, now being held in the setting of hyponatremia  -s/p paracentesis 11/22: 2L removed, SBP negative  -will monitor temperature & CBC for signs of infection

## 2017-11-24 NOTE — PLAN OF CARE
CM went to meet with patient for discharge planning but patient too lethargic.  Couldn't stay awake for interview.  CM reviewed patient's chart for discharge planning.  Plan is to discharge back to rehab.    Pharmacy:    [facility] - [place], [place] - [address]  [address]  [place] [address]  Phone: [phone] Fax: [phone]    [facility] - [place], [place] - [address]  [address]  [place] [address]  Phone: [phone] Fax: [phone]    PCP:  ODILIA Wall    Payor: MEDICAID / Plan: [facility] ([place] MEDICAID) / Product Type: Managed Medicaid /        11/24/17 1450   Discharge Assessment   Assessment Type Discharge Planning Assessment   Confirmed/corrected address and phone number on facesheet? Yes   Assessment information obtained from? Medical Record   Expected Length of Stay (days) 3   Communicated expected length of stay with patient/caregiver no   Prior to hospitilization cognitive status: Alert/Oriented   Prior to hospitalization functional status: Independent   Current cognitive status: Unable to Assess  (very lethargic)   Current Functional Status: Independent   Facility Arrived From: [facility]   Lives With child(sae), adult  (From [facility] rehab per record)   Able to Return to Prior Arrangements unable to determine at this time (comments)   Is patient able to care for self after discharge? Unable to determine at this time (comments)   Who are your caregiver(s) and their phone number(s)? Reginaldo Bazan Daughter [phone]   Patient's perception of discharge disposition rehab facility   Readmission Within The Last 30 Days previous discharge plan unsuccessful   Patient currently being followed by outpatient case management? No   Patient currently receives any other outside agency  services? No   Equipment Currently Used at Home rollator   Do you have any problems affording any of your prescribed medications? No   Is the patient taking medications as prescribed? yes   Does the patient have transportation home? Yes   Transportation Available family or friend will provide   Does the patient receive services at the [facility]? No   Discharge Plan A Rehab   Discharge Plan B Skilled Nursing Facility   Patient/Family In Agreement With Plan unable to assess   Readmission Questionnaire   At the time of your discharge, did someone talk to you about what your health problems were? Yes   At the time of discharge, did someone talk to you about what to watch out for regarding worsening of your health problem? Yes   At the time of discharge, did someone talk to you about what to do if you experienced worsening of your health problem? Yes   At the time of discharge, did someone talk to you about which medication to take when you left the hospital and which ones to stop taking? Yes   At the time of discharge, did someone talk to you about when and where to follow up with a doctor after you left the hospital? Yes   Do you have problems taking your medications as prescribed? No   Do you have any problems affording any of  your prescribed medications? No   Do you have problems obtaining/receiving your medications? No   Does the patient have transportation to healthcare appointments? Yes   Does the patient have family/friends to help with healtcare needs after discharge? yes

## 2017-11-24 NOTE — PROGRESS NOTES
SW Consult:  KARLA received notification that pt's evaluation for liver transplant may be re-opened. Pt had been attending inpatient addiction services at [facility] located at [address] [place] [address] (ph#[phone]). KARLA requested records from [facility] staff, Joellen, who agreed to send records asap. KARLA provided social work fax number.     SW presented to pt's room to determine if a caregiver plan has been put in place. Pt presented as lying in bed, alert, oriented x4, lethargic, and not opening eyes. Pt communicated with SW but made poor eye contact and provided brief responses. Pt reported she was in a significant amount of pain. At that time pt's family arrived. The following parties were present:    Name: Reginaldo Bazan  Relationship: daughter  Age: 34  Phone Numbers:  [phone] (mobile)    Name: Yani Taylor  Relationship: sister  Age: unknown  Phone Numbers:  [phone] (mobile)    Name: Duarte Bazan  Relationship: son  Age: unknown  Phone Numbers:  [phone] (mobile)    Name: Mindy De  Relationship: friend  Age: unknown  Phone Numbers:  [phone] (mobile)      All of the above provided verbal confirmation that they will be involved in pt's caregiver plan.     Primary caregiver: Yani Taylor, pt's sister will be the primary caregiver. Phone number: [phone]. Yani reported she was unable to be primary caregiver initially due to being a full time student. Yani reported that her current semester ends next week and she will take a leave of absence in order to care for pt. Yani reported this is her plan whether or not pt receives a liver transplant. Yani reported she will relocate with pt in the event pt receives a liver transplant and the above named individuals will assist when able. Primary caregiver confirmed that she has reliable transportation. Pt reported anyone in the caregiver plan that does not have a vehicle will be using hers. KARLA provided education on  caregiver roles and allowed an opportunity to ask any questions. Pt's caregivers expressed understanding and commitment to caregiver role. No other questions or concerns identified at this time.     SW [facility] If records suggest pt has been engaging in rehab and making progress, pt may be a candidate for re-evaluation. Transplant team notified of above findings. SW remains available.

## 2017-11-24 NOTE — ASSESSMENT & PLAN NOTE
Secondary to liver disease.  -INR 2.1; platelets 80  -denies s/s of bleed  -H/H stable;     Plan:  -will monitor with daily PT/INR, CBC  -holding DVT ppx

## 2017-11-25 PROBLEM — R74.8 ABNORMAL LIVER ENZYMES: Status: ACTIVE | Noted: 2017-11-25

## 2017-11-25 PROBLEM — B25.9 CYTOMEGALOVIRUS (CMV) VIREMIA: Status: ACTIVE | Noted: 2017-11-25

## 2017-11-25 PROBLEM — D69.6 THROMBOCYTOPENIA: Status: ACTIVE | Noted: 2017-11-25

## 2017-11-25 PROBLEM — E83.51 HYPOCALCEMIA: Status: ACTIVE | Noted: 2017-11-25

## 2017-11-25 LAB
ALBUMIN SERPL BCP-MCNC: 2.1 G/DL
ALBUMIN SERPL BCP-MCNC: 2.1 G/DL
ALP SERPL-CCNC: 333 U/L
ALP SERPL-CCNC: 336 U/L
ALT SERPL W/O P-5'-P-CCNC: 380 U/L
ALT SERPL W/O P-5'-P-CCNC: 394 U/L
ANION GAP SERPL CALC-SCNC: 11 MMOL/L
ANION GAP SERPL CALC-SCNC: 11 MMOL/L
ANISOCYTOSIS BLD QL SMEAR: SLIGHT
AST SERPL-CCNC: 271 U/L
AST SERPL-CCNC: 294 U/L
BASOPHILS # BLD AUTO: 0.01 K/UL
BASOPHILS NFR BLD: 0.1 %
BILIRUB DIRECT SERPL-MCNC: >14 MG/DL
BILIRUB SERPL-MCNC: 25 MG/DL
BILIRUB SERPL-MCNC: 25.1 MG/DL
BUN SERPL-MCNC: 22 MG/DL
BUN SERPL-MCNC: 24 MG/DL
BURR CELLS BLD QL SMEAR: ABNORMAL
CALCIUM SERPL-MCNC: 8.4 MG/DL
CALCIUM SERPL-MCNC: 8.6 MG/DL
CHLORIDE SERPL-SCNC: 88 MMOL/L
CHLORIDE SERPL-SCNC: 89 MMOL/L
CO2 SERPL-SCNC: 20 MMOL/L
CO2 SERPL-SCNC: 21 MMOL/L
CREAT SERPL-MCNC: 0.7 MG/DL
CREAT SERPL-MCNC: 0.9 MG/DL
DIFFERENTIAL METHOD: ABNORMAL
EBV DNA # BLD NAA+PROBE: NORMAL {COPIES}/ML
EBV DNA BY PCR: NORMAL
EOSINOPHIL # BLD AUTO: 0 K/UL
EOSINOPHIL NFR BLD: 0.1 %
ERYTHROCYTE [DISTWIDTH] IN BLOOD BY AUTOMATED COUNT: 24.7 %
EST. GFR  (AFRICAN AMERICAN): >60 ML/MIN/1.73 M^2
EST. GFR  (AFRICAN AMERICAN): >60 ML/MIN/1.73 M^2
EST. GFR  (NON AFRICAN AMERICAN): >60 ML/MIN/1.73 M^2
EST. GFR  (NON AFRICAN AMERICAN): >60 ML/MIN/1.73 M^2
GLUCOSE SERPL-MCNC: 119 MG/DL
GLUCOSE SERPL-MCNC: 121 MG/DL
HCT VFR BLD AUTO: 30.7 %
HGB BLD-MCNC: 11.5 G/DL
HYPOCHROMIA BLD QL SMEAR: ABNORMAL
IMM GRANULOCYTES # BLD AUTO: 0.08 K/UL
IMM GRANULOCYTES NFR BLD AUTO: 0.6 %
INR PPP: 2.3
INR PPP: 2.5
LOWER 95% CONFIDENCE INTERVAL: NORMAL
LYMPHOCYTES # BLD AUTO: 2.5 K/UL
LYMPHOCYTES NFR BLD: 17.6 %
MCH RBC QN AUTO: 29.4 PG
MCHC RBC AUTO-ENTMCNC: 37.5 G/DL
MCV RBC AUTO: 79 FL
MONOCYTES # BLD AUTO: 1.4 K/UL
MONOCYTES NFR BLD: 9.8 %
NEUTROPHILS # BLD AUTO: 10 K/UL
NEUTROPHILS NFR BLD: 71.8 %
NRBC BLD-RTO: 0 /100 WBC
PLATELET # BLD AUTO: 87 K/UL
PLATELET BLD QL SMEAR: ABNORMAL
PMV BLD AUTO: ABNORMAL FL
POIKILOCYTOSIS BLD QL SMEAR: SLIGHT
POLYCHROMASIA BLD QL SMEAR: ABNORMAL
POTASSIUM SERPL-SCNC: 3.7 MMOL/L
POTASSIUM SERPL-SCNC: 3.7 MMOL/L
PROT SERPL-MCNC: 5.7 G/DL
PROT SERPL-MCNC: 5.8 G/DL
PROTHROMBIN TIME: 23.1 SEC
PROTHROMBIN TIME: 25.3 SEC
RBC # BLD AUTO: 3.91 M/UL
SODIUM SERPL-SCNC: 119 MMOL/L
SODIUM SERPL-SCNC: 121 MMOL/L
UPPER 95% CONFIDENCE INTERVAL: NORMAL
WBC # BLD AUTO: 13.95 K/UL

## 2017-11-25 PROCEDURE — 85610 PROTHROMBIN TIME: CPT

## 2017-11-25 PROCEDURE — 80048 BASIC METABOLIC PNL TOTAL CA: CPT

## 2017-11-25 PROCEDURE — 80053 COMPREHEN METABOLIC PANEL: CPT

## 2017-11-25 PROCEDURE — 63600175 PHARM REV CODE 636 W HCPCS: Performed by: INTERNAL MEDICINE

## 2017-11-25 PROCEDURE — 99223 1ST HOSP IP/OBS HIGH 75: CPT | Mod: ,,, | Performed by: INTERNAL MEDICINE

## 2017-11-25 PROCEDURE — 80076 HEPATIC FUNCTION PANEL: CPT

## 2017-11-25 PROCEDURE — 99233 SBSQ HOSP IP/OBS HIGH 50: CPT | Mod: ,,, | Performed by: HOSPITALIST

## 2017-11-25 PROCEDURE — 11000001 HC ACUTE MED/SURG PRIVATE ROOM

## 2017-11-25 PROCEDURE — 85025 COMPLETE CBC W/AUTO DIFF WBC: CPT

## 2017-11-25 PROCEDURE — 97166 OT EVAL MOD COMPLEX 45 MIN: CPT

## 2017-11-25 PROCEDURE — 25000003 PHARM REV CODE 250: Performed by: STUDENT IN AN ORGANIZED HEALTH CARE EDUCATION/TRAINING PROGRAM

## 2017-11-25 PROCEDURE — 36415 COLL VENOUS BLD VENIPUNCTURE: CPT

## 2017-11-25 PROCEDURE — 85610 PROTHROMBIN TIME: CPT | Mod: 91

## 2017-11-25 PROCEDURE — 99233 SBSQ HOSP IP/OBS HIGH 50: CPT | Mod: ,,, | Performed by: INTERNAL MEDICINE

## 2017-11-25 RX ADMIN — RIFAXIMIN 200 MG: 200 TABLET ORAL at 09:11

## 2017-11-25 RX ADMIN — LACTULOSE 20 G: 20 SOLUTION ORAL at 09:11

## 2017-11-25 RX ADMIN — LACTULOSE 20 G: 20 SOLUTION ORAL at 01:11

## 2017-11-25 RX ADMIN — PANTOPRAZOLE SODIUM 40 MG: 40 TABLET, DELAYED RELEASE ORAL at 09:11

## 2017-11-25 RX ADMIN — CEFTRIAXONE SODIUM 2 G: 10 INJECTION, POWDER, FOR SOLUTION INTRAVENOUS at 01:11

## 2017-11-25 RX ADMIN — LACTULOSE 20 G: 20 SOLUTION ORAL at 06:11

## 2017-11-25 RX ADMIN — PREDNISONE 15 MG: 5 TABLET ORAL at 09:11

## 2017-11-25 NOTE — PLAN OF CARE
Problem: Occupational Therapy Goal  Goal: Occupational Therapy Goal  Goals to be met by: 12/10/17     Patient will increase functional independence with ADLs by performing:    UE Dressing with Set-up Assistance and Supervision.  LE Dressing with Minimal Assistance.  Grooming while standing at sink with Contact Guard Assistance.  Toileting from toilet with Stand-by Assistance for hygiene and clothing management.   Supine to sit with Supervision.  Stand pivot transfers with Contact Guard Assistance.  Toilet transfer to toilet with Contact Guard Assistance.    Outcome: Ongoing (interventions implemented as appropriate)  OT eval completed. The above goals are established to improve functional (I) and mobility.    JA Potts  11/25/2017  Pager: [phone]

## 2017-11-25 NOTE — ASSESSMENT & PLAN NOTE
Initially admitted to [facility] with decompensation. Has been attending etoh rehab.   Previously deferred due to lack of caregiver plan but this seems more stable now per our social work.  Will need to complete inpatient evaluation and present to committee.    Plan:  - Prednisone Taper  - obtain addiction psych and social work consults - likely after weekend  - lactulose and rifaximin  - CMP and INR daily  - empiric abx with ceftriaxone given significant risk for infxn.  - follow cultures  - 0.8 L free water restrict.    Will ask for inpatient liver transplant eval Monday.

## 2017-11-25 NOTE — SUBJECTIVE & OBJECTIVE
Interval History:     Ms. Bazan was doing better relative to yesterday, but remains very sick. Her mental status has improved with rifaximin and lactulose to the extent that she can participate in conversation, but she remains more fatigued than at baseline. Ceftriaxone added for empiric coverage given leukocytosis and her immunocompromised state. Of note her CMV viral PCR collected on 11/21 returned positive last night, prompting ID consultation.    Review of Systems   Constitutional: Positive for activity change, appetite change and fatigue. Negative for chills, fever and unexpected weight change.   HENT: Negative for mouth sores and trouble swallowing.    Eyes: Negative for redness and visual disturbance.   Respiratory: Negative for cough and shortness of breath.    Cardiovascular: Negative for chest pain, palpitations and leg swelling.   Gastrointestinal: Positive for abdominal distention. Negative for abdominal pain, blood in stool, constipation, diarrhea, nausea and vomiting.   Endocrine: Negative for cold intolerance, heat intolerance and polyuria.   Genitourinary: Negative for decreased urine volume, difficulty urinating, dysuria, flank pain and hematuria.   Musculoskeletal: Negative for arthralgias and back pain.   Skin: Positive for color change. Negative for pallor.   Neurological: Positive for weakness (generalized). Negative for light-headedness and headaches.   Hematological: Does not bruise/bleed easily.   Psychiatric/Behavioral: Positive for confusion and decreased concentration.     Objective:     Vital Signs (Most Recent):  Temp: 97.3 °F (36.3 °C) (11/25/17 1121)  Pulse: 86 (11/25/17 1121)  Resp: 16 (11/25/17 1121)  BP: 124/72 (11/25/17 1121)  SpO2: 95 % (11/25/17 1121) Vital Signs (24h Range):  Temp:  [97.3 °F (36.3 °C)-98.2 °F (36.8 °C)] 97.3 °F (36.3 °C)  Pulse:  [] 86  Resp:  [16-20] 16  SpO2:  [95 %-100 %] 95 %  BP: (100-124)/(53-72) 124/72     Weight: 69.6 kg (153 lb 7 oz)  Body mass  index is 25.53 kg/m².    Intake/Output Summary (Last 24 hours) at 11/25/17 1355  Last data filed at 11/25/17 1200   Gross per 24 hour   Intake               50 ml   Output             2000 ml   Net            -1950 ml      Physical Exam   Constitutional: She appears well-nourished. No distress.   Ill-appearing   HENT:   Head: Normocephalic and atraumatic.   Eyes: Conjunctivae and EOM are normal. Pupils are equal, round, and reactive to light. Scleral icterus is present.   Neck: Normal range of motion. Neck supple. No JVD present.   Cardiovascular: Normal rate and regular rhythm.  Exam reveals no gallop and no friction rub.    No murmur heard.  Pulmonary/Chest: Effort normal and breath sounds normal. No respiratory distress. She has no wheezes.   Abdominal: Bowel sounds are normal. She exhibits distension. She exhibits no mass. There is no tenderness.   Musculoskeletal: Normal range of motion. She exhibits no edema or tenderness.   Neurological: She displays normal reflexes. No cranial nerve deficit.     Wakes easily and answers questions (albeit slowly), but falls asleep without stimulation   Skin: Skin is warm and dry. No rash noted. No erythema.   Psychiatric: She has a normal mood and affect. Her behavior is normal.       Significant Labs:   CBC:    Recent Labs  Lab 11/24/17  0410 11/25/17  0358   WBC 11.15 13.95*   GRAN 66.4  7.4 71.8  10.0*   HGB 11.0* 11.5*   HCT 29.4* 30.7*   PLT 60* 87*       Chem 10:    Recent Labs  Lab 11/23/17  2335  11/24/17  1134 11/24/17  1915 11/25/17  0358   NA  --   < > 119* 121* 119*   K  --   < > 3.8 3.9 3.7   CL  --   < > 88* 88* 88*   CO2  --   < > 23 20* 20*   BUN  --   < > 11 16 22*   CREATININE  --   < > 0.7 0.9 0.7   GLU  --   < > 71 124* 119*   CALCIUM  --   < > 8.3* 8.4* 8.4*   MG 1.8  --   --   --   --    PHOS 2.5*  --   --   --   --    < > = values in this interval not displayed.    LFTs:    Recent Labs  Lab 11/23/17  2221 11/24/17  0410 11/25/17  0358   ALKPHOS 329*  "321* 333*   BILITOT 23.7* 22.7* 25.0*   * 284* 271*   * 397* 380*   ALBUMIN 2.1* 1.9* 2.1*       Significant Imaging:     CXR 11/24/17:  " No acute intrathoracic process seen." - per interpreting radiologist  "

## 2017-11-25 NOTE — ASSESSMENT & PLAN NOTE
51 y/o female with a history of ESLD secondary to alcoholic cirrhosis, arterial HTN transferred to [facility] from [facility] for evaluation of decompensated liver cirrhosis. ID consulted for Blood CMV PCR of 11/21/17 was 266, CMV IgM and IgG positive. Patient has no blurry vision, no GI symptoms, no CNS symptoms.    - CMV viremia is low, seems to be reactivation  - at this moment would not start therapy to avoid side effects of therapy and there are no signs of invasive disease   - would repeat CMV PCR blood on 11/28/17 to monitor levels  - if there is plan for immediate liver transplant then would star induction therapy with Ganciclovir

## 2017-11-25 NOTE — ASSESSMENT & PLAN NOTE
-- Holding diuretics at present, s/p 2L removed on 11/22 with testing negative for SBP  -- No abdominal pain today

## 2017-11-25 NOTE — PROGRESS NOTES
[facility] Hospital Medicine  Progress Note    Patient Name: Marcie Bazan  MRN: [id number]  Patient Class: IP- Inpatient   Admission Date: 11/23/2017  Length of Stay: 2 days  Attending Physician: Chandra Campbell, *  Primary Care Provider: Milton Ferrer, [facility] Medicine Team: [facility] HOSP MED 2 Jared Miller MD    Subjective:     Principal Problem:Decompensated hepatic cirrhosis    HPI:  52 year old female with PMHx of 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 Patient presented to [facility] with generalized weakness. Onset two months ago, progressively worsening. Was admitted to  11/10 for hyponatremia seen on labs in GI office. Discharged on 11/15 and returned on 11/20 with 1 week worsening weakness, malaise, lethargy, and decreased appetite. Denied fever/chills, abdominal pain, bright red or black tarry stools, hematemesis or other abnormal bleeding, confusion or disorientation, changes in urination, light-headedness, headache, changes in vision, dyspnea, chest pain or palpitations. Labs demonstrated Na 116 and MELD 32; patient admitted for hyponatremia. Nephrology consulted, recommended fluid restriction , holding diuretics, and prednisode taper that started 11/23 (decreased from 40mg QD to 20mg Qd.) Patient transferred to [facility] for revaluation of liver disease. Of note, paracentesis performed 11/22, removed 2L, negative for SBP.    Follows with Dr. Souza for cirrhosis. Since early 09/2017, patient's liver failure progressing indicated by worsening clinical signs (abdominal distention, weakness/fatigue) and MELD. In mid-October, patient's PETH demonstrated EtOH use and she was denied candidacy for liver transplant. She was then enrolled in in-patient substance abuse program at [facility] where she has remained  since early November; per patient, her last EtOH beverage was 11/01/2017. Denies history of GI bleed, SBP, renal disease, or other complications of cirrhosis. Liver biopsy 10/24 demonstrated stage 4 cirrhosis. Records reports EGD in August 2016 that showed small esophageal varices with severe gastritis. Colonoscopy 2015 with 2 polyps and internal hemorrhoids Started on prednisone 40mg QD on 10/25; was on spironolactone, has been held since 11/10 for hypoNa; on lactulose.    Hospital Course:  11/25/2017: Mental status improving with hepatic encephalopathy treatment. Started empiric ceftriaxone given leukocytosis in immunocompromised state. CMV PCR positive overnight, for which ID was consulted    Interval History:     Ms. Bazan was doing better relative to yesterday, but remains very sick. Her mental status has improved with rifaximin and lactulose to the extent that she can participate in conversation, but she remains more fatigued than at baseline. Ceftriaxone added for empiric coverage given leukocytosis and her immunocompromised state. Of note her CMV viral PCR collected on 11/21 returned positive last night, prompting ID consultation.    Review of Systems   Constitutional: Positive for activity change, appetite change and fatigue. Negative for chills, fever and unexpected weight change.   HENT: Negative for mouth sores and trouble swallowing.    Eyes: Negative for redness and visual disturbance.   Respiratory: Negative for cough and shortness of breath.    Cardiovascular: Negative for chest pain, palpitations and leg swelling.   Gastrointestinal: Positive for abdominal distention. Negative for abdominal pain, blood in stool, constipation, diarrhea, nausea and vomiting.   Endocrine: Negative for cold intolerance, heat intolerance and polyuria.   Genitourinary: Negative for decreased urine volume, difficulty urinating, dysuria, flank pain and hematuria.   Musculoskeletal: Negative for arthralgias and back pain.    Skin: Positive for color change. Negative for pallor.   Neurological: Positive for weakness (generalized). Negative for light-headedness and headaches.   Hematological: Does not bruise/bleed easily.   Psychiatric/Behavioral: Positive for confusion and decreased concentration.     Objective:     Vital Signs (Most Recent):  Temp: 97.3 °F (36.3 °C) (11/25/17 1121)  Pulse: 86 (11/25/17 1121)  Resp: 16 (11/25/17 1121)  BP: 124/72 (11/25/17 1121)  SpO2: 95 % (11/25/17 1121) Vital Signs (24h Range):  Temp:  [97.3 °F (36.3 °C)-98.2 °F (36.8 °C)] 97.3 °F (36.3 °C)  Pulse:  [] 86  Resp:  [16-20] 16  SpO2:  [95 %-100 %] 95 %  BP: (100-124)/(53-72) 124/72     Weight: 69.6 kg (153 lb 7 oz)  Body mass index is 25.53 kg/m².    Intake/Output Summary (Last 24 hours) at 11/25/17 1355  Last data filed at 11/25/17 1200   Gross per 24 hour   Intake               50 ml   Output             2000 ml   Net            -1950 ml      Physical Exam   Constitutional: She appears well-nourished. No distress.   Ill-appearing   HENT:   Head: Normocephalic and atraumatic.   Eyes: Conjunctivae and EOM are normal. Pupils are equal, round, and reactive to light. Scleral icterus is present.   Neck: Normal range of motion. Neck supple. No JVD present.   Cardiovascular: Normal rate and regular rhythm.  Exam reveals no gallop and no friction rub.    No murmur heard.  Pulmonary/Chest: Effort normal and breath sounds normal. No respiratory distress. She has no wheezes.   Abdominal: Bowel sounds are normal. She exhibits distension. She exhibits no mass. There is no tenderness.   Musculoskeletal: Normal range of motion. She exhibits no edema or tenderness.   Neurological: She displays normal reflexes. No cranial nerve deficit.     Wakes easily and answers questions (albeit slowly), but falls asleep without stimulation   Skin: Skin is warm and dry. No rash noted. No erythema.   Psychiatric: She has a normal mood and affect. Her behavior is normal.  "      Significant Labs:   CBC:    Recent Labs  Lab 11/24/17  0410 11/25/17  0358   WBC 11.15 13.95*   GRAN 66.4  7.4 71.8  10.0*   HGB 11.0* 11.5*   HCT 29.4* 30.7*   PLT 60* 87*       Chem 10:    Recent Labs  Lab 11/23/17  2335  11/24/17  1134 11/24/17  1915 11/25/17  0358   NA  --   < > 119* 121* 119*   K  --   < > 3.8 3.9 3.7   CL  --   < > 88* 88* 88*   CO2  --   < > 23 20* 20*   BUN  --   < > 11 16 22*   CREATININE  --   < > 0.7 0.9 0.7   GLU  --   < > 71 124* 119*   CALCIUM  --   < > 8.3* 8.4* 8.4*   MG 1.8  --   --   --   --    PHOS 2.5*  --   --   --   --    < > = values in this interval not displayed.    LFTs:    Recent Labs  Lab 11/23/17  2221 11/24/17  0410 11/25/17  0358   ALKPHOS 329* 321* 333*   BILITOT 23.7* 22.7* 25.0*   * 284* 271*   * 397* 380*   ALBUMIN 2.1* 1.9* 2.1*       Significant Imaging:     CXR 11/24/17:  " No acute intrathoracic process seen." - per interpreting radiologist    Assessment/Plan:      * Decompensated hepatic cirrhosis    -- Transferred for hepatology consultation and repeat evaluation for liver transplantation  -- Complicated by hepatic encephalopathy, for which she is being treated with lactulose and rifaximin. Otherwise no history of bleed (though she has varices) and no manifestations of SBP with no abdominal pain and negative ascitic testing on 11/22  -- MELD-Na 33 today. Hepatology actively following for transplant evaluation. Most recent EtOH use about one month ago per family yesterday  -- Will obtain addiction psychiatry and SW consults after the holiday weekend  -- Tapering prednisone  -- Following with CMP + PT-INR BID. LFTs stable but very poor as demonstrated by MELD.  -- Contacted by inpatient transplant hepatology staff hospitalist today, will plan on transferring to his service for further management        Cytomegalovirus (CMV) viremia    -- Prior CMV examination revealed positive IgG (10/20/2017) and IgG (11/15/2017) testing  -- CMV PCR " "performed 11/21 was positive at 266. Consulted ID for recommendations, specifically further testing vs. treatment recommendations with ganciclovir        Thrombocytopenia    -- Stable  -- Etiology liver failure          Abnormal liver enzymes    -- Stable  -- Etiology liver failure; see that section for more details          Acute on chronic hyponatremia    -- Stable  -- Improved somewhat with NPO status, but now down to 119. Continuing with strict fluid restrictions          End stage liver disease    Follows with Dr. Souza in [facility] for ESLD.     -see "decompensated hepatic cirrhosis" for plan  -will con't to optimize supportive care (nutrition, pain) and monitor for sequelae of liver failure          Hepatic encephalopathy    -- Improving, though not yet returned to baseline  -- Continue lactulose and rifaximin          Ascites    -- Holding diuretics at present, s/p 2L removed on 11/22 with testing negative for SBP  -- No abdominal pain today              Essential hypertension    - Stable  - Holding home lisinopril in setting of decompensated liver disease          Anemia of chronic disease    - Stable with hemoglobin 11.5 today  - Will follow with CBC daily            VTE Risk Mitigation         Ordered     Place sequential compression device  Until discontinued      11/24/17 0734     Place FABIOLA hose  Until discontinued      11/23/17 2305     Medium Risk of VTE  Once      11/23/17 2305          Jared Miller MD  Department of Hospital Medicine   [facility]  "

## 2017-11-25 NOTE — ASSESSMENT & PLAN NOTE
-- Transferred for hepatology consultation and repeat evaluation for liver transplantation  -- Complicated by hepatic encephalopathy, for which she is being treated with lactulose and rifaximin. Otherwise no history of bleed (though she has varices) and no manifestations of SBP with no abdominal pain and negative ascitic testing on 11/22  -- MELD-Na 33 today. Hepatology actively following for transplant evaluation. Most recent EtOH use about one month ago per family yesterday  -- Will obtain addiction psychiatry and SW consults after the holiday weekend  -- Tapering prednisone  -- Following with CMP + PT-INR BID. LFTs stable but very poor as demonstrated by MELD.  -- Contacted by inpatient transplant hepatology staff hospitalist today, will plan on transferring to his service for further management

## 2017-11-25 NOTE — HPI
51 y/o female with a history of ESLD secondary to alcoholic cirrhosis, arterial HTN transferred to [facility] [place] from [facility] for evaluation of decompensated liver cirrhosis. Patient presented to [facility] with generalized weakness since about two months ago, that have been progressively getting worse. She was admitted to  11/10/17 for hyponatremia seen on labs in GI office and was discharged on 11/15/17; readmitted on 11/20/17 with due to  worsening generalized weakness, malaise, lethargy, and decreased appetite. Labs showed hyponatremia  of 116 and MELD 32.. Nephrology consulted, recommended fluid restriction , holding diuretics, and prednisode taper that started 11/23 (decreased from 40mg QD to 20mg Qd.) Patient transferred to [facility] for revaluation of liver disease. Of note, paracentesis performed 11/22/17, (total wbc 78) removed 2L.     ID consulted for Blood CMV PCR of 266, CMV IgM and IgG positive. Patient has no blurry vision, no GI symptoms, no CNS symptoms.

## 2017-11-25 NOTE — HOSPITAL COURSE
Mental status improving with hepatic encephalopathy treatment. Started empiric ceftriaxone given leukocytosis in immunocompromised state. CMV PCR positive overnight, for which ID was consulted, recommended repeating titers in 1 week. Seen by Psychiatry and  who determined she was moderate risk for Liver transplant. Will proceed with EGD/Cscope and mammogram after FFP. Colonoscopy not performed due to poor prep, EGD revealed large varices which were banded. IR paracentesis ordered after patient started complaining of abdominal pain. Mammogram performed, IR paracentesis after INR Reversal negative for SBP. Patient accepted for Liver transplant pending review of previous mammograms.  Patient was slightly confused and altered, which resolved with lactulose enemas. She will need a core Breast Biopsy prior to listing in order to rule out malignancy.

## 2017-11-25 NOTE — CONSULTS
[facility]  Infectious Disease  Consult Note    Patient Name: Marcie Bazan  MRN: [id number]  Admission Date: 11/23/2017  Hospital Length of Stay: 2 days  Attending Physician: Chandra Campbell, *  Primary Care Provider: ODILIA Wall     Isolation Status: No active isolations    Patient information was obtained from relative(s), past medical records and ER records.      Inpatient consult to Infectious Diseases  Consult performed by: LÁZARO VICK  Consult ordered by: RACHID SHELTON  Reason for consult: positive CMV IgM, IgG, and PCR in patient undergoing liver transplant eval        Assessment/Plan:     Cytomegalovirus (CMV) viremia    53 y/o female with a history of ESLD secondary to alcoholic cirrhosis, arterial HTN [place] [facility] [place] [facility] for evaluation of decompensated liver cirrhosis. ID consulted for Blood CMV PCR of 11/21/17 was 266, CMV IgM and IgG positive. Patient has no blurry vision, no GI symptoms, no CNS symptoms.    - CMV viremia is low, seems to be reactivation  - at this moment would not start therapy to avoid side effects of therapy and there are no signs of invasive disease   - would repeat CMV PCR blood on 11/28/17 to monitor levels  - if there is plan for immediate liver transplant then would star induction therapy with Ganciclovir            Thank you for your consult. I will follow-up with patient. Please contact us if you have any additional questions.    Lázaro Encarnacion MD  Infectious Disease Fellow, PGY-5  Pager: [phone]  [facility]    Subjective:     Principal Problem: Decompensated hepatic cirrhosis    HPI: 53 y/o female with a history of ESLD secondary to alcoholic cirrhosis, arterial HTN [place] [facility] [place] [facility] for evaluation of decompensated liver cirrhosis. Patient presented to [facility] with generalized weakness since about two months ago, that have been progressively getting worse.  She was admitted to  11/10/17 for hyponatremia seen on labs in GI office and was discharged on 11/15/17; readmitted on 11/20/17 with due to  worsening generalized weakness, malaise, lethargy, and decreased appetite. Labs showed hyponatremia  of 116 and MELD 32.. Nephrology consulted, recommended fluid restriction , holding diuretics, and prednisode taper that started 11/23 (decreased from 40mg QD to 20mg Qd.) Patient transferred to [facility] for revaluation of liver disease. Of note, paracentesis performed 11/22/17, (total wbc 78) removed 2L.     ID consulted for Blood CMV PCR of 266, CMV IgM and IgG positive. Patient has no blurry vision, no GI symptoms, no CNS symptoms.    Past Medical History:   Diagnosis Date    Alcohol abuse     Alcoholic hepatitis     Anemia of chronic disease     Coagulopathy     End stage liver disease     Hypertension     Hyponatremia     Liver cirrhosis     Liver transplant candidate        Past Surgical History:   Procedure Laterality Date    BREAST SURGERY      CHOLECYSTECTOMY      HYSTERECTOMY         Review of patient's allergies indicates:   Allergen Reactions    Ferrous sulfate Other (See Comments)     Patient states the pill makes her sick. She stated she would rather have a shot       Medications:  Prescriptions Prior to Admission   Medication Sig    cetirizine (ZYRTEC) 10 MG tablet Take 10 mg by mouth once daily.    lactulose (CHRONULAC) 10 gram/15 mL solution TK 45 ML PO QID    lisinopril (PRINIVIL,ZESTRIL) 5 MG tablet Take 1 tablet by mouth only as needed for high blood pressure    omeprazole (PRILOSEC) 40 MG capsule Take 40 mg by mouth once daily.    ondansetron (ZOFRAN-ODT) 4 MG TbDL Take 1 tablet (4 mg total) by mouth every 8 (eight) hours as needed (nausea and vomiting).    potassium chloride SA (K-DUR,KLOR-CON) 20 MEQ tablet Taking only when taking lasix    predniSONE (DELTASONE) 20 MG tablet Take 2 tablets (40 mg total) by mouth once daily.     spironolactone (ALDACTONE) 100 MG tablet Hold until follow-up with GI     Antibiotics     Start     Stop Route Frequency Ordered    11/25/17 0900  cefTRIAXone (ROCEPHIN) 2 g in dextrose 5 % 50 mL IVPB      -- IV Every 24 hours (non-standard times) 11/25/17 0823    11/24/17 1145  rifAXImin tablet 200 mg      -- Oral 2 times daily 11/24/17 1033        Antifungals     None        Antivirals     None           Immunization History   Administered Date(s) Administered    Influenza - Quadrivalent - PF (3 years & older) 10/17/2013, 12/23/2014, 09/28/2015, 11/03/2016, 10/25/2017    Pneumococcal Conjugate - 13 Valent 10/25/2017    Pneumococcal Polysaccharide - 23 Valent 04/02/2011    Tdap 01/10/2013       Family History     Problem Relation (Age of Onset)    Depression Maternal Grandfather    Diabetes Father, Sister    Hypertension Mother, Father        Social History     Social History    Marital status: Single     Spouse name: N/A    Number of children: N/A    Years of education: N/A     Social History Main Topics    Smoking status: Never Smoker    Smokeless tobacco: Never Used    Alcohol use Yes      Comment: Currently admitted to inpatient rehab    Drug use: No    Sexual activity: Not Currently     Other Topics Concern    None     Social History Narrative    None     Review of Systems   Unable to perform ROS: Acuity of condition     Objective:     Vital Signs (Most Recent):  Temp: 97.3 °F (36.3 °C) (11/25/17 1121)  Pulse: 86 (11/25/17 1121)  Resp: 16 (11/25/17 1121)  BP: 124/72 (11/25/17 1121)  SpO2: 95 % (11/25/17 1121) Vital Signs (24h Range):  Temp:  [97.3 °F (36.3 °C)-98.2 °F (36.8 °C)] 97.3 °F (36.3 °C)  Pulse:  [] 86  Resp:  [16-20] 16  SpO2:  [95 %-100 %] 95 %  BP: (100-124)/(53-72) 124/72     Weight: 69.6 kg (153 lb 7 oz)  Body mass index is 25.53 kg/m².    Estimated Creatinine Clearance: 89.9 mL/min (based on SCr of 0.7 mg/dL).    Physical Exam   Constitutional: She appears lethargic. No  distress.   HENT:   Mouth/Throat: No oropharyngeal exudate.   Eyes: Scleral icterus is present.   Cardiovascular: Normal rate and regular rhythm.  Exam reveals no friction rub.    No murmur heard.  Pulmonary/Chest: Effort normal and breath sounds normal. She has no wheezes. She has no rales.   Abdominal: Soft. She exhibits no distension and no mass. There is no tenderness. There is no rebound and no guarding.   Musculoskeletal: She exhibits no edema.   Lymphadenopathy:     She has no cervical adenopathy.   Neurological: She appears lethargic. She is disoriented.   Skin: No rash noted.       Significant Labs:   Bilirubin:   Recent Labs  Lab 11/13/17  0502 11/15/17  0446  11/22/17  0555 11/23/17  0530 11/23/17  2221 11/24/17  0410 11/25/17  0358   BILIDIR 6.6* 8.5*  --   --   --   --   --  >14.0*   BILITOT 11.5* 14.9*  < > 21.1* 21.3* 23.7* 22.7* 25.0*   < > = values in this interval not displayed.  Blood Culture:   Recent Labs  Lab 09/12/17  1614 10/19/17  1229 10/19/17  1343 11/22/17  1611 11/24/17  1058   LABBLOO No growth after 5 days. No growth after 5 days. No growth after 5 days. No Growth to date  No Growth to date  No Growth to date  No Growth to date  No Growth to date  No Growth to date No Growth to date  No Growth to date  No Growth to date  No Growth to date     BMP:   Recent Labs  Lab 11/23/17  2335  11/25/17  0358   GLU  --   < > 119*   NA  --   < > 119*   K  --   < > 3.7   CL  --   < > 88*   CO2  --   < > 20*   BUN  --   < > 22*   CREATININE  --   < > 0.7   CALCIUM  --   < > 8.4*   MG 1.8  --   --    < > = values in this interval not displayed.  CBC:   Recent Labs  Lab 11/24/17  0410 11/25/17  0358   WBC 11.15 13.95*   HGB 11.0* 11.5*   HCT 29.4* 30.7*   PLT 60* 87*     CMP:   Recent Labs  Lab 11/23/17  2221 11/24/17  0410 11/24/17  1134 11/24/17  1915 11/25/17  0358   * 117*  117* 119* 121* 119*   K 3.5 3.4*  3.4* 3.8 3.9 3.7   CL 89* 87*  87* 88* 88* 88*   CO2 22* 22*  22* 23 20*  20*   * 93  93 71 124* 119*   BUN 11 10  10 11 16 22*   CREATININE 0.7 0.6  0.6 0.7 0.9 0.7   CALCIUM 8.3* 8.0*  8.0* 8.3* 8.4* 8.4*   PROT 5.7* 5.7*  --   --  5.8*   ALBUMIN 2.1* 1.9*  --   --  2.1*   BILITOT 23.7* 22.7*  --   --  25.0*   ALKPHOS 329* 321*  --   --  333*   * 284*  --   --  271*   * 397*  --   --  380*   ANIONGAP 8 8  8 8 13 11   EGFRNONAA >60.0 >60.0  >60.0 >60.0 >60.0 >60.0     Microbiology Results (last 7 days)     Procedure Component Value Units Date/Time    Blood culture [[id number]] Collected:  11/24/17 1058    Order Status:  Completed Specimen:  Blood Updated:  11/25/17 1412     Blood Culture, Routine No Growth to date     Blood Culture, Routine No Growth to date    Blood culture [[id number]] Collected:  11/24/17 1058    Order Status:  Completed Specimen:  Blood Updated:  11/25/17 1412     Blood Culture, Routine No Growth to date     Blood Culture, Routine No Growth to date    Urine culture [[id number]] Collected:  11/24/17 1748    Order Status:  Sent Specimen:  Urine from Urine, Catheterized Updated:  11/24/17 1901          Significant Imaging: I have reviewed all pertinent imaging results/findings within the past 24 hours.

## 2017-11-25 NOTE — PLAN OF CARE
Problem: Fall Risk (Adult)  Goal: Absence of Falls  Patient will demonstrate the desired outcomes by discharge/transition of care.   Outcome: Ongoing (interventions implemented as appropriate)  Fall precaution reinforced with maintaining hourly rounds. Confirmed with the lowest position of the bed, activated alarm, and s/r placement on rounds. No fall occurrence on this shift as of this time. Continue to monitor.

## 2017-11-25 NOTE — ASSESSMENT & PLAN NOTE
-- Stable  -- Improved somewhat with NPO status, but now down to 119. Continuing with strict fluid restrictions

## 2017-11-25 NOTE — SUBJECTIVE & OBJECTIVE
Past Medical History:   Diagnosis Date    Alcohol abuse     Alcoholic hepatitis     Anemia of chronic disease     Coagulopathy     End stage liver disease     Hypertension     Hyponatremia     Liver cirrhosis     Liver transplant candidate        Past Surgical History:   Procedure Laterality Date    BREAST SURGERY      CHOLECYSTECTOMY      HYSTERECTOMY         Review of patient's allergies indicates:   Allergen Reactions    Ferrous sulfate Other (See Comments)     Patient states the pill makes her sick. She stated she would rather have a shot       Medications:  Prescriptions Prior to Admission   Medication Sig    cetirizine (ZYRTEC) 10 MG tablet Take 10 mg by mouth once daily.    lactulose (CHRONULAC) 10 gram/15 mL solution TK 45 ML PO QID    lisinopril (PRINIVIL,ZESTRIL) 5 MG tablet Take 1 tablet by mouth only as needed for high blood pressure    omeprazole (PRILOSEC) 40 MG capsule Take 40 mg by mouth once daily.    ondansetron (ZOFRAN-ODT) 4 MG TbDL Take 1 tablet (4 mg total) by mouth every 8 (eight) hours as needed (nausea and vomiting).    potassium chloride SA (K-DUR,KLOR-CON) 20 MEQ tablet Taking only when taking lasix    predniSONE (DELTASONE) 20 MG tablet Take 2 tablets (40 mg total) by mouth once daily.    spironolactone (ALDACTONE) 100 MG tablet Hold until follow-up with GI     Antibiotics     Start     Stop Route Frequency Ordered    11/25/17 0900  cefTRIAXone (ROCEPHIN) 2 g in dextrose 5 % 50 mL IVPB      -- IV Every 24 hours (non-standard times) 11/25/17 0823    11/24/17 1145  rifAXImin tablet 200 mg      -- Oral 2 times daily 11/24/17 1033        Antifungals     None        Antivirals     None           Immunization History   Administered Date(s) Administered    Influenza - Quadrivalent - PF (3 years & older) 10/17/2013, 12/23/2014, 09/28/2015, 11/03/2016, 10/25/2017    Pneumococcal Conjugate - 13 Valent 10/25/2017    Pneumococcal Polysaccharide - 23 Valent 04/02/2011    Tdap  01/10/2013       Family History     Problem Relation (Age of Onset)    Depression Maternal Grandfather    Diabetes Father, Sister    Hypertension Mother, Father        Social History     Social History    Marital status: Single     Spouse name: N/A    Number of children: N/A    Years of education: N/A     Social History Main Topics    Smoking status: Never Smoker    Smokeless tobacco: Never Used    Alcohol use Yes      Comment: Currently admitted to inpatient rehab    Drug use: No    Sexual activity: Not Currently     Other Topics Concern    None     Social History Narrative    None     Review of Systems   Unable to perform ROS: Acuity of condition     Objective:     Vital Signs (Most Recent):  Temp: 97.3 °F (36.3 °C) (11/25/17 1121)  Pulse: 86 (11/25/17 1121)  Resp: 16 (11/25/17 1121)  BP: 124/72 (11/25/17 1121)  SpO2: 95 % (11/25/17 1121) Vital Signs (24h Range):  Temp:  [97.3 °F (36.3 °C)-98.2 °F (36.8 °C)] 97.3 °F (36.3 °C)  Pulse:  [] 86  Resp:  [16-20] 16  SpO2:  [95 %-100 %] 95 %  BP: (100-124)/(53-72) 124/72     Weight: 69.6 kg (153 lb 7 oz)  Body mass index is 25.53 kg/m².    Estimated Creatinine Clearance: 89.9 mL/min (based on SCr of 0.7 mg/dL).    Physical Exam   Constitutional: She appears lethargic. No distress.   HENT:   Mouth/Throat: No oropharyngeal exudate.   Eyes: Scleral icterus is present.   Cardiovascular: Normal rate and regular rhythm.  Exam reveals no friction rub.    No murmur heard.  Pulmonary/Chest: Effort normal and breath sounds normal. She has no wheezes. She has no rales.   Abdominal: Soft. She exhibits no distension and no mass. There is no tenderness. There is no rebound and no guarding.   Musculoskeletal: She exhibits no edema.   Lymphadenopathy:     She has no cervical adenopathy.   Neurological: She appears lethargic. She is disoriented.   Skin: No rash noted.       Significant Labs:   Bilirubin:   Recent Labs  Lab 11/13/17  0502 11/15/17  0446  11/22/17  0555  11/23/17  0530 11/23/17  2221 11/24/17 0410 11/25/17  0358   BILIDIR 6.6* 8.5*  --   --   --   --   --  >14.0*   BILITOT 11.5* 14.9*  < > 21.1* 21.3* 23.7* 22.7* 25.0*   < > = values in this interval not displayed.  Blood Culture:   Recent Labs  Lab 09/12/17  1614 10/19/17  1229 10/19/17  1343 11/22/17  1611 11/24/17  1058   LABBLOO No growth after 5 days. No growth after 5 days. No growth after 5 days. No Growth to date  No Growth to date  No Growth to date  No Growth to date  No Growth to date  No Growth to date No Growth to date  No Growth to date  No Growth to date  No Growth to date     BMP:   Recent Labs  Lab 11/23/17  2335  11/25/17  0358   GLU  --   < > 119*   NA  --   < > 119*   K  --   < > 3.7   CL  --   < > 88*   CO2  --   < > 20*   BUN  --   < > 22*   CREATININE  --   < > 0.7   CALCIUM  --   < > 8.4*   MG 1.8  --   --    < > = values in this interval not displayed.  CBC:   Recent Labs  Lab 11/24/17 0410 11/25/17  0358   WBC 11.15 13.95*   HGB 11.0* 11.5*   HCT 29.4* 30.7*   PLT 60* 87*     CMP:   Recent Labs  Lab 11/23/17 2221 11/24/17 0410 11/24/17  1134 11/24/17  1915 11/25/17  0358   * 117*  117* 119* 121* 119*   K 3.5 3.4*  3.4* 3.8 3.9 3.7   CL 89* 87*  87* 88* 88* 88*   CO2 22* 22*  22* 23 20* 20*   * 93  93 71 124* 119*   BUN 11 10  10 11 16 22*   CREATININE 0.7 0.6  0.6 0.7 0.9 0.7   CALCIUM 8.3* 8.0*  8.0* 8.3* 8.4* 8.4*   PROT 5.7* 5.7*  --   --  5.8*   ALBUMIN 2.1* 1.9*  --   --  2.1*   BILITOT 23.7* 22.7*  --   --  25.0*   ALKPHOS 329* 321*  --   --  333*   * 284*  --   --  271*   * 397*  --   --  380*   ANIONGAP 8 8  8 8 13 11   EGFRNONAA >60.0 >60.0  >60.0 >60.0 >60.0 >60.0     Microbiology Results (last 7 days)     Procedure Component Value Units Date/Time    Blood culture [[id number]] Collected:  11/24/17 1058    Order Status:  Completed Specimen:  Blood Updated:  11/25/17 1412     Blood Culture, Routine No Growth to date     Blood Culture,  Routine No Growth to date    Blood culture [[id number]] Collected:  11/24/17 1058    Order Status:  Completed Specimen:  Blood Updated:  11/25/17 1412     Blood Culture, Routine No Growth to date     Blood Culture, Routine No Growth to date    Urine culture [[id number]] Collected:  11/24/17 1748    Order Status:  Sent Specimen:  Urine from Urine, Catheterized Updated:  11/24/17 1901          Significant Imaging: I have reviewed all pertinent imaging results/findings within the past 24 hours.

## 2017-11-25 NOTE — SUBJECTIVE & OBJECTIVE
Interval History:     Still lethargic. Encephalopathic.    ICU evaluated and does not require ICU care per their eval.    Current Facility-Administered Medications   Medication    cefTRIAXone (ROCEPHIN) 2 g in dextrose 5 % 50 mL IVPB    lactulose 20 gram/30 mL solution Soln 20 g    ondansetron disintegrating tablet 4 mg    pantoprazole EC tablet 40 mg    predniSONE tablet 15 mg    rifAXImin tablet 200 mg    sodium chloride 0.9% flush 3 mL       Objective:     Vital Signs (Most Recent):  Temp: 98 °F (36.7 °C) (11/25/17 0718)  Pulse: 91 (11/25/17 0718)  Resp: 20 (11/25/17 0718)  BP: (!) 100/53 (11/25/17 0718)  SpO2: 96 % (11/25/17 0718) Vital Signs (24h Range):  Temp:  [97.5 °F (36.4 °C)-98.2 °F (36.8 °C)] 98 °F (36.7 °C)  Pulse:  [] 91  Resp:  [16-20] 20  SpO2:  [96 %-100 %] 96 %  BP: (100-118)/(53-67) 100/53     Weight: 69.6 kg (153 lb 7 oz) (11/23/17 2147)  Body mass index is 25.53 kg/m².    Physical Exam   Constitutional:   Confused ; appears very frail   HENT:   Head: Normocephalic.   Eyes: Scleral icterus is present.   Cardiovascular: Normal rate.    Pulmonary/Chest: Effort normal.   Abdominal: Soft.   + fluid wave ; nontender   Neurological:   Disoriented ; very slow to respond   Vitals reviewed.      MELD-Na score: 33 at 11/25/2017  3:58 AM  MELD score: 28 at 11/25/2017  3:58 AM  Calculated from:  Serum Creatinine: 0.7 mg/dL (Rounded to 1) at 11/25/2017  3:58 AM  Serum Sodium: 119 mmol/L (Rounded to 125) at 11/25/2017  3:58 AM  Total Bilirubin: 25.0 mg/dL at 11/25/2017  3:58 AM  INR(ratio): 2.3 at 11/25/2017  3:58 AM  Age: 52 years    Significant Labs:  CBC:   Recent Labs  Lab 11/25/17  0358   WBC 13.95*   RBC 3.91*   HGB 11.5*   HCT 30.7*   PLT 87*     CMP:   Recent Labs  Lab 11/25/17  0358   *   CALCIUM 8.4*   ALBUMIN 2.1*   PROT 5.8*   *   K 3.7   CO2 20*   CL 88*   BUN 22*   CREATININE 0.7   ALKPHOS 333*   *   *   BILITOT 25.0*     Coagulation:   Recent Labs  Lab  11/23/17  2335 11/25/17  0358   INR 2.2* 2.3*   APTT 36.1*  --        Significant Imaging:  Labs: Reviewed

## 2017-11-25 NOTE — PLAN OF CARE
Problem: Patient Care Overview  Goal: Plan of Care Review  Outcome: Ongoing (interventions implemented as appropriate)  Safety:  call light in reach, patient oriented to room & instructed how to notify nurse if assistance is needed, questions & concerns addressed, bed in lowest position with wheels locked & side rails up X 2, fall precautions followed, patient free from fall & injury thus far this shift;  VTE/bleeding precautions maintained.  Activity:  patient up w assistance of PT for short period,  weight shifted at least every other hour.  Neurological:  patient A&O X 3-4, follows commands, equal  strength & dorsi/plantarflexion, neuro checks performed.  Respiratory:  patient tolerates room air without distress, denies SOB.  Cardiac:  Denies chest pain, BP stable.  HR stable.  NSR sinus noman on telemetry..  Afebrile this shift.  GI:  Patient tolerates PO intake well, appetite improving, denies nausea, LBM 11/25/17, fecal management device in place..  :  patient voids minimally acceptable volumes of dark tea.  Skin:  CDI.  Devices:  PIV CDI, negative for s/sx of infection & infiltration.  Pain:  patient denies pain.  Family members @ bedside, POC reviewed w all. Will continue to monitor patient.

## 2017-11-25 NOTE — PT/OT/SLP EVAL
Occupational Therapy  Evaluation    Marcie Bazan   MRN: [id number]   Admitting Diagnosis: Decompensated hepatic cirrhosis    OT Date of Treatment: 11/25/17   OT Start Time: 0845  OT Stop Time: 0902  OT Total Time (min): 17 min    Billable Minutes:  Evaluation 17 minutes    Diagnosis: Decompensated hepatic cirrhosis   Decreased strength, endurance, balance, coordination, ROM; impaired cognition and safety awareness    Past Medical History:   Diagnosis Date    Alcohol abuse     Alcoholic hepatitis     Anemia of chronic disease     Coagulopathy     End stage liver disease     Hypertension     Hyponatremia     Liver cirrhosis     Liver transplant candidate       Past Surgical History:   Procedure Laterality Date    BREAST SURGERY      CHOLECYSTECTOMY      HYSTERECTOMY         Referring physician: MINDI Campbell  Date referred to OT: 11/25/2017    General Precautions: Standard, fall    Do you have any cultural, spiritual, Voodoo conflicts, given your current situation?: none stated     Patient History:  Living Environment  Lives With: child(sae), adult  Living Arrangements: house  Transportation Available: family or friend will provide  Living Environment Comment: Pt lives with daughter in a 1SH with no PATTY; daughter assists with ADLs as needed.  Equipment Currently Used at Home: rollator    Prior level of function:   Bed Mobility/Transfers: independent  Grooming: independent  Bathing: needs assist  Upper Body Dressing: needs assist  Lower Body Dressing: needs assist  Toileting: independent  Home Management Skills: needs assist  Homemaking Responsibilities: Yes  Mode of Transportation: Family     Dominant hand: right    Subjective:  Communicated with RN prior to session.    Chief Complaint: none  Patient/Family stated goals: none stated; pt did not speak much, demo delayed processing and response    Pain/Comfort  Pain Rating 1: 0/10  Pain Rating Post-Intervention 1: 0/10    Objective:  Patient found with:  bowel management system, pt found R side lying in bed and agreeable to therapy.    Cognitive Exam:  Oriented to: Person  Follows Commands/attention: Inattentive, Easily distracted and Follows one-step commands inconsistently  Communication: clear/fluent when she decides to speak; delayed processing  Memory:  Impaired  Safety awareness/insight to disability: impaired  Coping skills/emotional control: Appropriate to situation    Visual/perceptual:  Intact    Physical Exam:  Postural examination/scapula alignment: No postural abnormalities identified  Skin integrity: Visible skin intact  Edema: Moderate abdominal    Sensation:   Intact    Upper Extremity Range of Motion:  Right Upper Extremity: WFL  Left Upper Extremity: WFL    Upper Extremity Strength:  Right Upper Extremity: 2/5  Left Upper Extremity: 2/5   Strength: Fair    Fine motor coordination:   Intact    Gross motor coordination: impaired    Functional Mobility:  Bed Mobility:  Supine to Sit: Minimum Assistance  Sit to Supine: Stand by Assistance    Transfers:  Sit <> Stand Assistance: Contact Guard Assistance  Sit <> Stand Assistive Device: No Assistive Device  Bed <> Chair Technique: Stand Pivot  Bed <> Chair Transfer Assistance: Contact Guard Assistance  Bed <> Chair Assistive Device: No Assistive Device    Functional Ambulation: CGA with no AD    Activities of Daily Living:    LE Dressing Level of Assistance: Maximum assistance (donning socks and shoes; impaired initiation)    Balance:   Static Sit: GOOD: Takes MODERATE challenges from all directions  Dynamic Sit: FAIR+: Maintains balance through MINIMAL excursions of active trunk motion  Static Stand: FAIR: Maintains without assist but unable to take challenges  Dynamic stand: FAIR: Needs CONTACT GUARD during gait    Therapeutic Activities and Exercises:  Pt ed re OT role and POC. Pt performed supine to sit with Min A. Pt performed strength and ROM testing. Pt required Max A to don socks and shoes 2/2  "delayed processing and impaired balance. Pt performed sit to stand t/f with CGA and ambulated 2 feet to recliner. Pt sat in recliner, but demo decreased safety as she began L side lying and scooting toward EOC. Pt stood from chair with CGA and pivoted to EOB with CGA. Pt sat EOB and returned to supine with SBA.     AM-PAC 6 CLICK ADL  How much help from another person does this patient currently need?  1 = Unable, Total/Dependent Assistance  2 = A lot, Maximum/Moderate Assistance  3 = A little, Minimum/Contact Guard/Supervision  4 = None, Modified [place]/Independent    Putting on and taking off regular lower body clothing? : 2  Bathing (including washing, rinsing, drying)?: 2  Toileting, which includes using toilet, bedpan, or urinal? : 2  Putting on and taking off regular upper body clothing?: 3  Taking care of personal grooming such as brushing teeth?: 3  Eating meals?: 3  Total Score: 15    AM-PAC Raw Score CMS "G-Code Modifier Level of Impairment Assistance   6 % Total / Unable   7 - 9 CM 80 - 100% Maximal Assist   10-14 CL 60 - 80% Moderate Assist   15 - 19 CK 40 - 60% Moderate Assist   20 - 22 CJ 20 - 40% Minimal Assist   23 CI 1-20% SBA / CGA   24 CH 0% Independent/ Mod I       Patient left right sidelying with all lines intact, call button in reach and RN notified, bed alarm on    Assessment:  Marcie Bazan is a 52 y.o. female with a medical diagnosis of Decompensated hepatic cirrhosis and presents with the impairments listed below. Pt participates well, but demo decreased processing skills and delayed responses. Pt responding best to simple 1-step commands, but inconsistently. Pt able to transfer with CGA, but requiring increased assistance for self care tasks. At this time, pt appears only interested in laying down, whether in bed or reclined in the chair. Pt would benefit from cont OT to improve self care skills and mobility.    Pt evaluation falls under moderate complexity for evaluation " coding due to identification of 3-5 performance deficits noted as stated above. Eval required Min/Mod assistance to complete on this date and detailed assessment(s) were utilized. Moreover, an expanded review of history and occupational profile obtained with additional review of cognitive, physical and psychosocial hx.       Rehab identified problem list/impairments: Rehab identified problem list/impairments: weakness, impaired endurance, impaired self care skills, impaired functional mobilty, gait instability, impaired balance, decreased lower extremity function, decreased safety awareness, edema    Rehab potential is good.    Activity tolerance: Fair    Discharge recommendations: Discharge Facility/Level Of Care Needs: nursing facility, skilled     Barriers to discharge: Barriers to Discharge: Decreased caregiver support    Equipment recommendations: walker, rolling, bedside commode, shower chair     GOALS:    Occupational Therapy Goals        Problem: Occupational Therapy Goal    Goal Priority Disciplines Outcome Interventions   Occupational Therapy Goal     OT, PT/OT Ongoing (interventions implemented as appropriate)    Description:  Goals to be met by: 12/10/17     Patient will increase functional independence with ADLs by performing:    UE Dressing with Set-up Assistance and Supervision.  LE Dressing with Minimal Assistance.  Grooming while standing at sink with Contact Guard Assistance.  Toileting from toilet with Stand-by Assistance for hygiene and clothing management.   Supine to sit with Supervision.  Stand pivot transfers with Contact Guard Assistance.  Toilet transfer to toilet with Contact Guard Assistance.                      PLAN:  Patient to be seen 3 x/week to address the above listed problems via self-care/home management, therapeutic activities, therapeutic exercises, cognitive retraining  Plan of Care expires: 12/25/17  Plan of Care reviewed with: patient    JA Potts  11/25/2017  Pager:  [phone]

## 2017-11-25 NOTE — PLAN OF CARE
Problem: Patient Care Overview  Goal: Plan of Care Review  Outcome: Ongoing (interventions implemented as appropriate)  Safety:  call light in reach, patient somnolent, rouses to loud verbal stimulation. Delayed responses and confusion noted. Oriented to room & instructed how to notify nurse if assistance is needed, bed in lowest position with wheels locked & side rails up X 2, fall precautions followed, patient free from fall & injury thus far this shift;  VTE/bleeding precautions maintained.  Activity:  bedrest, weight shifted at least every other hour.  Neurological:  Prior to lactulose enema: Patient disoriented  X 4, delayed response at times appropriate w  flat affect. After lactulose dwelled 2.5 hrs much more alert, oriented times 3 and appropriate speech pattern noted. MD aware of changes. Family at the bedside for later part of the shift..  Respiratory:  patient tolerates room air without distress, denies SOB.  Cardiac:  Denies chest pain, BP stable.  HR stable.  Afebrile this shift.  GI:  NPO , denies nausea, LBM 11/24/17.  :  patient voids orange urine with  foul odor spontaneously & without difficulty, adequate output for shift, in and out cath sample collected for culture.  Skin:  CDI. Mid sized external Hemorrhoid  which is bleeding noted after rectal tube insertion for lactulose dwell and  administration  Devices:  PIV CDI, negative for s/sx of infection & infiltration.  Pain:  patient denies pain.  Will continue to monitor patient. OK received to leave rectal tube in until po lactulose dose given and well tolerated. PO lactulose Not scheduled on this shift.

## 2017-11-25 NOTE — PROGRESS NOTES
[facility]  Hepatology  Progress Note    Patient Name: Marcie Bazan  MRN: [id number]  Admission Date: 11/23/2017  Hospital Length of Stay: 2 days  Attending Provider: Chandra Campbell, *   Primary Care Physician: ODILIA Wall  Principal Problem:Decompensated hepatic cirrhosis    Subjective:     Transplant status: Pre-transplant    HPI: 52 year old female with PMHx of ESLD 2/2 EtOH hepatitis and essential HTN who presents to [facility] as a direct transfer from [facility] for evaluation of decompensated liver cirrhosis. Patient presented to [facility] with generalized weakness and was admitted to  11/10 for hyponatremia seen on labs in GI office. Discharged on 11/15 and returned on 11/20 with 1 week worsening weakness, malaise, lethargy, and decreased appetite. Denied fever/chills, abdominal pain, bright red or black tarry stools, hematemesis or other abnormal bleeding, confusion or disorientation, changes in urination, light-headedness, headache, changes in vision, dyspnea, chest pain or palpitations. Labs demonstrated Na 116 and MELD 32; patient admitted for hyponatremia. Nephrology consulted, recommended fluid restriction , holding diuretics, and prednisode taper that started 11/23 (decreased from 40mg QD to 20mg Qd.) Patient transferred to [facility] for revaluation of liver disease. Of note, paracentesis performed 11/22, removed 2L, negative for SBP.     Follows with Dr. Souza for cirrhosis. Since early 09/2017, patient's liver failure progressing indicated by worsening clinical signs (abdominal distention, weakness/fatigue) and MELD. She had [place] [facility] [place] and declined transplant candidacy 2/2 continued EtOH use and lack of solid caregiver support/plan.She has since been enrolled in in-patient substance abuse program at [facility] where she has remained since early November; per patient, her last EtOH  "beverage was 11/01/2017.  Per chart review on 11/22/17:     "Patient's case was discussed in liver selection committee today. Per committee discussion, the SW will call patient's daughter to discuss caregiver plan at this time. If the caregiver plan is adequate, patient will need to follow up with either/both addiction psych or SW regarding substance abuse and plan. May need to do this inpatient if patient is still admitted."        Denies history of GI bleed, SBP, renal disease, or other complications of cirrhosis. Liver biopsy 10/24 demonstrated stage 4 cirrhosis. Records reports EGD in August 2016 that showed small esophageal varices with severe gastritis. Colonoscopy 2015 with 2 polyps and internal hemorrhoids Started on prednisone 40mg QD on 10/25; was on spironolactone, has been held since 11/10 for hypoNa; on lactulose.    Interval History:     Still lethargic. Encephalopathic.    ICU evaluated and does not require ICU care per their eval.    Current Facility-Administered Medications   Medication    cefTRIAXone (ROCEPHIN) 2 g in dextrose 5 % 50 mL IVPB    lactulose 20 gram/30 mL solution Soln 20 g    ondansetron disintegrating tablet 4 mg    pantoprazole EC tablet 40 mg    predniSONE tablet 15 mg    rifAXImin tablet 200 mg    sodium chloride 0.9% flush 3 mL       Objective:     Vital Signs (Most Recent):  Temp: 98 °F (36.7 °C) (11/25/17 0718)  Pulse: 91 (11/25/17 0718)  Resp: 20 (11/25/17 0718)  BP: (!) 100/53 (11/25/17 0718)  SpO2: 96 % (11/25/17 0718) Vital Signs (24h Range):  Temp:  [97.5 °F (36.4 °C)-98.2 °F (36.8 °C)] 98 °F (36.7 °C)  Pulse:  [] 91  Resp:  [16-20] 20  SpO2:  [96 %-100 %] 96 %  BP: (100-118)/(53-67) 100/53     Weight: 69.6 kg (153 lb 7 oz) (11/23/17 2147)  Body mass index is 25.53 kg/m².    Physical Exam   Constitutional:   Confused ; appears very frail   HENT:   Head: Normocephalic.   Eyes: Scleral icterus is present.   Cardiovascular: Normal rate.    Pulmonary/Chest: Effort " normal.   Abdominal: Soft.   + fluid wave ; nontender   Neurological:   Disoriented ; very slow to respond   Vitals reviewed.      MELD-Na score: 33 at 11/25/2017  3:58 AM  MELD score: 28 at 11/25/2017  3:58 AM  Calculated from:  Serum Creatinine: 0.7 mg/dL (Rounded to 1) at 11/25/2017  3:58 AM  Serum Sodium: 119 mmol/L (Rounded to 125) at 11/25/2017  3:58 AM  Total Bilirubin: 25.0 mg/dL at 11/25/2017  3:58 AM  INR(ratio): 2.3 at 11/25/2017  3:58 AM  Age: 52 years    Significant Labs:  CBC:   Recent Labs  Lab 11/25/17 0358   WBC 13.95*   RBC 3.91*   HGB 11.5*   HCT 30.7*   PLT 87*     CMP:   Recent Labs  Lab 11/25/17 0358   *   CALCIUM 8.4*   ALBUMIN 2.1*   PROT 5.8*   *   K 3.7   CO2 20*   CL 88*   BUN 22*   CREATININE 0.7   ALKPHOS 333*   *   *   BILITOT 25.0*     Coagulation:   Recent Labs  Lab 11/23/17  2335 11/25/17  0358   INR 2.2* 2.3*   APTT 36.1*  --        Significant Imaging:  Labs: Reviewed    Assessment/Plan:     * Decompensated hepatic cirrhosis    Initially admitted to [facility] with decompensation. Has been attending etoh rehab.   Previously deferred due to lack of caregiver plan but this seems more stable now per our social work.  Will need to complete inpatient evaluation and present to committee.    Plan:  - Prednisone Taper  - obtain addiction psych and social work consults - likely after weekend  - lactulose and rifaximin  - CMP and INR daily  - empiric abx with ceftriaxone given significant risk for infxn.  - follow cultures  - 0.8 L free water restrict.    Will ask for inpatient liver transplant eval Monday.              Hyponatremia    As above            Thank you for your consult. I will follow-up with patient. Please contact us if you have any additional questions.    Stalin Powers MD  Hepatology  [facility]

## 2017-11-26 PROBLEM — D68.9 COAGULOPATHY: Status: ACTIVE | Noted: 2017-11-26

## 2017-11-26 PROBLEM — K70.31 ALCOHOLIC CIRRHOSIS OF LIVER WITH ASCITES: Chronic | Status: RESOLVED | Noted: 2017-10-19 | Resolved: 2017-11-26

## 2017-11-26 PROBLEM — K70.31 ALCOHOLIC CIRRHOSIS OF LIVER WITH ASCITES: Status: ACTIVE | Noted: 2017-11-26

## 2017-11-26 LAB
ALBUMIN SERPL BCP-MCNC: 2 G/DL
ALBUMIN SERPL BCP-MCNC: 2 G/DL
ALP SERPL-CCNC: 331 U/L
ALP SERPL-CCNC: 340 U/L
ALT SERPL W/O P-5'-P-CCNC: 377 U/L
ALT SERPL W/O P-5'-P-CCNC: 387 U/L
ANION GAP SERPL CALC-SCNC: 11 MMOL/L
ANION GAP SERPL CALC-SCNC: 9 MMOL/L
AST SERPL-CCNC: 299 U/L
AST SERPL-CCNC: 319 U/L
BASOPHILS # BLD AUTO: 0.01 K/UL
BASOPHILS NFR BLD: 0.1 %
BILIRUB SERPL-MCNC: 25.1 MG/DL
BILIRUB SERPL-MCNC: 26.2 MG/DL
BUN SERPL-MCNC: 26 MG/DL
BUN SERPL-MCNC: 26 MG/DL
C DIFF GDH STL QL: NEGATIVE
C DIFF TOX A+B STL QL IA: NEGATIVE
CALCIUM SERPL-MCNC: 8.4 MG/DL
CALCIUM SERPL-MCNC: 8.4 MG/DL
CHLORIDE SERPL-SCNC: 88 MMOL/L
CHLORIDE SERPL-SCNC: 90 MMOL/L
CO2 SERPL-SCNC: 21 MMOL/L
CO2 SERPL-SCNC: 21 MMOL/L
CREAT SERPL-MCNC: 0.8 MG/DL
CREAT SERPL-MCNC: 0.9 MG/DL
DIFFERENTIAL METHOD: ABNORMAL
EOSINOPHIL # BLD AUTO: 0 K/UL
EOSINOPHIL NFR BLD: 0.3 %
ERYTHROCYTE [DISTWIDTH] IN BLOOD BY AUTOMATED COUNT: 24.2 %
EST. GFR  (AFRICAN AMERICAN): >60 ML/MIN/1.73 M^2
EST. GFR  (AFRICAN AMERICAN): >60 ML/MIN/1.73 M^2
EST. GFR  (NON AFRICAN AMERICAN): >60 ML/MIN/1.73 M^2
EST. GFR  (NON AFRICAN AMERICAN): >60 ML/MIN/1.73 M^2
GLUCOSE SERPL-MCNC: 106 MG/DL
GLUCOSE SERPL-MCNC: 116 MG/DL
HCT VFR BLD AUTO: 32 %
HGB BLD-MCNC: 12 G/DL
IMM GRANULOCYTES # BLD AUTO: 0.06 K/UL
IMM GRANULOCYTES NFR BLD AUTO: 0.5 %
INR PPP: 2.3
INR PPP: 2.3
LYMPHOCYTES # BLD AUTO: 2.2 K/UL
LYMPHOCYTES NFR BLD: 19.8 %
MCH RBC QN AUTO: 30.3 PG
MCHC RBC AUTO-ENTMCNC: 37.5 G/DL
MCV RBC AUTO: 81 FL
MONOCYTES # BLD AUTO: 1.1 K/UL
MONOCYTES NFR BLD: 9.9 %
NEUTROPHILS # BLD AUTO: 7.9 K/UL
NEUTROPHILS NFR BLD: 69.4 %
NRBC BLD-RTO: 0 /100 WBC
PLATELET # BLD AUTO: 71 K/UL
PMV BLD AUTO: ABNORMAL FL
POTASSIUM SERPL-SCNC: 3.4 MMOL/L
POTASSIUM SERPL-SCNC: 3.6 MMOL/L
PROT SERPL-MCNC: 5.6 G/DL
PROT SERPL-MCNC: 5.7 G/DL
PROTHROMBIN TIME: 22.8 SEC
PROTHROMBIN TIME: 22.9 SEC
RBC # BLD AUTO: 3.96 M/UL
SODIUM SERPL-SCNC: 120 MMOL/L
SODIUM SERPL-SCNC: 120 MMOL/L
WBC # BLD AUTO: 11.34 K/UL

## 2017-11-26 PROCEDURE — 99233 SBSQ HOSP IP/OBS HIGH 50: CPT | Mod: ,,, | Performed by: HOSPITALIST

## 2017-11-26 PROCEDURE — 25000003 PHARM REV CODE 250: Performed by: STUDENT IN AN ORGANIZED HEALTH CARE EDUCATION/TRAINING PROGRAM

## 2017-11-26 PROCEDURE — 99233 SBSQ HOSP IP/OBS HIGH 50: CPT | Mod: ,,, | Performed by: INTERNAL MEDICINE

## 2017-11-26 PROCEDURE — 36415 COLL VENOUS BLD VENIPUNCTURE: CPT

## 2017-11-26 PROCEDURE — 97161 PT EVAL LOW COMPLEX 20 MIN: CPT

## 2017-11-26 PROCEDURE — 63600175 PHARM REV CODE 636 W HCPCS: Performed by: INTERNAL MEDICINE

## 2017-11-26 PROCEDURE — 87449 NOS EACH ORGANISM AG IA: CPT

## 2017-11-26 PROCEDURE — 85025 COMPLETE CBC W/AUTO DIFF WBC: CPT

## 2017-11-26 PROCEDURE — 11000001 HC ACUTE MED/SURG PRIVATE ROOM

## 2017-11-26 PROCEDURE — 25000003 PHARM REV CODE 250: Performed by: HOSPITALIST

## 2017-11-26 PROCEDURE — 63600175 PHARM REV CODE 636 W HCPCS: Performed by: HOSPITALIST

## 2017-11-26 PROCEDURE — 85610 PROTHROMBIN TIME: CPT

## 2017-11-26 PROCEDURE — 80053 COMPREHEN METABOLIC PANEL: CPT

## 2017-11-26 PROCEDURE — P9047 ALBUMIN (HUMAN), 25%, 50ML: HCPCS | Performed by: HOSPITALIST

## 2017-11-26 RX ORDER — ALBUMIN HUMAN 250 G/1000ML
25 SOLUTION INTRAVENOUS 3 TIMES DAILY
Status: DISCONTINUED | OUTPATIENT
Start: 2017-11-26 | End: 2017-12-04

## 2017-11-26 RX ADMIN — LACTULOSE 20 G: 20 SOLUTION ORAL at 02:11

## 2017-11-26 RX ADMIN — CEFTRIAXONE SODIUM 2 G: 10 INJECTION, POWDER, FOR SOLUTION INTRAVENOUS at 12:11

## 2017-11-26 RX ADMIN — RIFAXIMIN 200 MG: 200 TABLET ORAL at 09:11

## 2017-11-26 RX ADMIN — PANTOPRAZOLE SODIUM 40 MG: 40 TABLET, DELAYED RELEASE ORAL at 09:11

## 2017-11-26 RX ADMIN — RIFAXIMIN 550 MG: 550 TABLET ORAL at 09:11

## 2017-11-26 RX ADMIN — PREDNISONE 15 MG: 5 TABLET ORAL at 09:11

## 2017-11-26 RX ADMIN — ALBUMIN (HUMAN) 25 G: 12.5 SOLUTION INTRAVENOUS at 09:11

## 2017-11-26 RX ADMIN — LACTULOSE 20 G: 20 SOLUTION ORAL at 06:11

## 2017-11-26 RX ADMIN — LACTULOSE 20 G: 20 SOLUTION ORAL at 09:11

## 2017-11-26 RX ADMIN — PHYTONADIONE 10 MG: 10 INJECTION, EMULSION INTRAMUSCULAR; INTRAVENOUS; SUBCUTANEOUS at 10:11

## 2017-11-26 NOTE — PLAN OF CARE
Problem: Patient Care Overview  Goal: Plan of Care Review  Outcome: Ongoing (interventions implemented as appropriate)  Safety:  call light in reach, patient oriented to room & instructed how to notify nurse if assistance is needed, questions & concerns addressed, bed in lowest position with wheels locked & side rails up X 2, fall precautions followed, patient free from fall & injury thus far this shift;  VTE/bleeding precautions maintained.  Activity:  patient up with assistance of one per Pt, stayed in chair for 20 min., weight shifted at least every other hour.  Neurological:  patient A&O X 3, follows commands, equal  strength & dorsi/plantarflexion, neuro checks performed.  Respiratory:  patient tolerates room air without distress, denies SOB.  Cardiac:  Denies chest pain, BP stable.  HR stable.   Afebrile this shift.  GI:  Patient tolerates PO intake well, denies nausea, LBM 11/26/17 per rectal tube which is well tolerated. :  patient requiring in and out caths to relieve bladder, dark tea color urine removed, 600 cc's. adequate output for shift.  Skin:  CDI.  Devices:  PIV CDI, negative for s/sx of infection & infiltration.  Pain:  patient denies pain.  Tolerating IV rocephIn q day, will continue to monitor patient. Family remains @ bedside.

## 2017-11-26 NOTE — SUBJECTIVE & OBJECTIVE
Interval History:   Still lethargic. Encephalopathic but more alert today. BMS in place w/ foul smelling discharge     Current Facility-Administered Medications   Medication    cefTRIAXone (ROCEPHIN) 2 g in dextrose 5 % 50 mL IVPB    lactulose 20 gram/30 mL solution Soln 20 g    ondansetron disintegrating tablet 4 mg    pantoprazole EC tablet 40 mg    predniSONE tablet 15 mg    rifAXImin tablet 200 mg    sodium chloride 0.9% flush 3 mL       Objective:     Vital Signs (Most Recent):  Temp: 97.2 °F (36.2 °C) (11/26/17 0756)  Pulse: 87 (11/26/17 0756)  Resp: 16 (11/26/17 0410)  BP: (!) 105/52 (11/26/17 0756)  SpO2: 98 % (11/26/17 0756) Vital Signs (24h Range):  Temp:  [96.2 °F (35.7 °C)-97.4 °F (36.3 °C)] 97.2 °F (36.2 °C)  Pulse:  [86-96] 87  Resp:  [16-20] 16  SpO2:  [95 %-100 %] 98 %  BP: (101-124)/(52-72) 105/52     Weight: 69.6 kg (153 lb 7 oz) (11/23/17 2147)  Body mass index is 25.53 kg/m².    Physical Exam   Constitutional:   Confused ; appears very frail   HENT:   Head: Normocephalic.   Eyes: Scleral icterus is present.   Cardiovascular: Normal rate.    Pulmonary/Chest: Effort normal.   Abdominal: Soft.   + fluid wave ; nontender   Neurological:   Disoriented ; very slow to respond   Vitals reviewed.      MELD-Na score: 33 at 11/26/2017  5:50 AM  MELD score: 28 at 11/26/2017  5:50 AM  Calculated from:  Serum Creatinine: 0.8 mg/dL (Rounded to 1) at 11/26/2017  5:50 AM  Serum Sodium: 120 mmol/L (Rounded to 125) at 11/26/2017  5:50 AM  Total Bilirubin: 25.1 mg/dL at 11/26/2017  5:50 AM  INR(ratio): 2.3 at 11/26/2017  5:50 AM  Age: 52 years    Significant Labs:  CBC:     Recent Labs  Lab 11/26/17  0550   WBC 11.34   RBC 3.96*   HGB 12.0   HCT 32.0*   PLT 71*     CMP:     Recent Labs  Lab 11/26/17  0550      CALCIUM 8.4*   ALBUMIN 2.0*   PROT 5.6*   *   K 3.6   CO2 21*   CL 90*   BUN 26*   CREATININE 0.8   ALKPHOS 340*   *   *   BILITOT 25.1*     Coagulation:   Recent Labs  Lab  11/23/17  2335  11/26/17  0550   INR 2.2*  < > 2.3*   APTT 36.1*  --   --    < > = values in this interval not displayed.    Significant Imaging:  Labs: Reviewed    Review of Systems

## 2017-11-26 NOTE — PROGRESS NOTES
[facility]  Hepatology  Progress Note    Patient Name: Marcie Bazan  MRN: [id number]  Admission Date: 11/23/2017  Hospital Length of Stay: 3 days  Attending Provider: Burton Vora MD   Primary Care Physician: ODILIA Wall  Principal Problem:Decompensated hepatic cirrhosis    Subjective:     Transplant status: Pre-transplant    HPI: 52 year old female with PMHx of ESLD 2/2 EtOH hepatitis and essential HTN who presents to [facility] as a direct transfer from [facility] for evaluation of decompensated liver cirrhosis. Patient presented to [facility] with generalized weakness and was admitted to  11/10 for hyponatremia seen on labs in GI office. Discharged on 11/15 and returned on 11/20 with 1 week worsening weakness, malaise, lethargy, and decreased appetite. Denied fever/chills, abdominal pain, bright red or black tarry stools, hematemesis or other abnormal bleeding, confusion or disorientation, changes in urination, light-headedness, headache, changes in vision, dyspnea, chest pain or palpitations. Labs demonstrated Na 116 and MELD 32; patient admitted for hyponatremia. Nephrology consulted, recommended fluid restriction , holding diuretics, and prednisode taper that started 11/23 (decreased from 40mg QD to 20mg Qd.) Patient transferred to [facility] for revaluation of liver disease. Of note, paracentesis performed 11/22, removed 2L, negative for SBP.     Follows with Dr. Souza for cirrhosis. Since early 09/2017, patient's liver failure progressing indicated by worsening clinical signs (abdominal distention, weakness/fatigue) and MELD. [place] [facility] [place] and declined transplant candidacy 2/2 continued EtOH use and lack of solid caregiver support/plan.She has since been enrolled in in-patient substance abuse program at [facility] where she has remained since early November; per patient, her last EtOH beverage was  "11/01/2017.  Per chart review on 11/22/17:     "Patient's case was discussed in liver selection committee today. Per committee discussion, the SW will call patient's daughter to discuss caregiver plan at this time. If the caregiver plan is adequate, patient will need to follow up with either/both addiction psych or SW regarding substance abuse and plan. May need to do this inpatient if patient is still admitted."        Denies history of GI bleed, SBP, renal disease, or other complications of cirrhosis. Liver biopsy 10/24 demonstrated stage 4 cirrhosis. Records reports EGD in August 2016 that showed small esophageal varices with severe gastritis. Colonoscopy 2015 with 2 polyps and internal hemorrhoids Started on prednisone 40mg QD on 10/25; was on spironolactone, has been held since 11/10 for hypoNa; on lactulose.    Interval History:   Still lethargic. Encephalopathic but more alert today. BMS in place w/ foul smelling discharge     Current Facility-Administered Medications   Medication    cefTRIAXone (ROCEPHIN) 2 g in dextrose 5 % 50 mL IVPB    lactulose 20 gram/30 mL solution Soln 20 g    ondansetron disintegrating tablet 4 mg    pantoprazole EC tablet 40 mg    predniSONE tablet 15 mg    rifAXImin tablet 200 mg    sodium chloride 0.9% flush 3 mL       Objective:     Vital Signs (Most Recent):  Temp: 97.2 °F (36.2 °C) (11/26/17 0756)  Pulse: 87 (11/26/17 0756)  Resp: 16 (11/26/17 0410)  BP: (!) 105/52 (11/26/17 0756)  SpO2: 98 % (11/26/17 0756) Vital Signs (24h Range):  Temp:  [96.2 °F (35.7 °C)-97.4 °F (36.3 °C)] 97.2 °F (36.2 °C)  Pulse:  [86-96] 87  Resp:  [16-20] 16  SpO2:  [95 %-100 %] 98 %  BP: (101-124)/(52-72) 105/52     Weight: 69.6 kg (153 lb 7 oz) (11/23/17 2147)  Body mass index is 25.53 kg/m².    Physical Exam   Constitutional:   Confused ; appears very frail   HENT:   Head: Normocephalic.   Eyes: Scleral icterus is present.   Cardiovascular: Normal rate.    Pulmonary/Chest: Effort normal. "   Abdominal: Soft.   + fluid wave ; nontender   Neurological:   Disoriented ; very slow to respond   Vitals reviewed.      MELD-Na score: 33 at 11/26/2017  5:50 AM  MELD score: 28 at 11/26/2017  5:50 AM  Calculated from:  Serum Creatinine: 0.8 mg/dL (Rounded to 1) at 11/26/2017  5:50 AM  Serum Sodium: 120 mmol/L (Rounded to 125) at 11/26/2017  5:50 AM  Total Bilirubin: 25.1 mg/dL at 11/26/2017  5:50 AM  INR(ratio): 2.3 at 11/26/2017  5:50 AM  Age: 52 years    Significant Labs:  CBC:     Recent Labs  Lab 11/26/17  0550   WBC 11.34   RBC 3.96*   HGB 12.0   HCT 32.0*   PLT 71*     CMP:     Recent Labs  Lab 11/26/17  0550      CALCIUM 8.4*   ALBUMIN 2.0*   PROT 5.6*   *   K 3.6   CO2 21*   CL 90*   BUN 26*   CREATININE 0.8   ALKPHOS 340*   *   *   BILITOT 25.1*     Coagulation:   Recent Labs  Lab 11/23/17  2335  11/26/17  0550   INR 2.2*  < > 2.3*   APTT 36.1*  --   --    < > = values in this interval not displayed.    Significant Imaging:  Labs: Reviewed    Review of Systems        Assessment/Plan:     * Decompensated hepatic cirrhosis    Initially admitted to [facility] with decompensation. Has been attending etoh rehab.   Previously deferred due to lack of caregiver plan but this seems more stable now per our social work.  Will need to complete inpatient evaluation and present to committee.    Plan:  - continue Prednisone Taper  - obtain addiction psych and social work consults - likely after weekend  - continue lactulose and rifaximin, titrate to 3-4 BMs/day  - Obtain C Diff studies w/ new foul smelling output and concern for underlying infection    - CMP and INR daily  - continue treatment for likely UTI and f/u on Cx results, sensitivities, and susceptibilities   - follow cultures  - f/u PETH test  - 0.8 L free water restrict, (please ensure enforcement of restriction)  - pt w/ results of + CMV PCR load, agree w/ ID recs, no evidence of end organ damage, monitor for now, will consider  treatment down the line if transplant is pursued     Will ask for inpatient liver transplant eval Monday.                  Thank you for your consult. I will follow-up with patient. Please contact us if you have any additional questions.    Magan Lazcano MD  Hepatology  [facility]

## 2017-11-26 NOTE — PLAN OF CARE
Problem: Physical Therapy Goal  Goal: Physical Therapy Goal  Goals to be met by: 17    Patient will increase functional independence with mobility by performin. Supine to sit with Set-up [place]  2. Sit to supine with Set-up [place]  3. Sit to stand transfer with Supervision  4. Bed to chair transfer with Supervision using Rolling Walker  5. Gait  x 100 feet with Stand-by Assistance using Rolling Walker.   6. Lower extremity exercise program x20 reps per handout, with independence    Outcome: Ongoing (interventions implemented as appropriate)  PT Goals established following initial skip Melendrez, PT  2017

## 2017-11-26 NOTE — PT/OT/SLP EVAL
Physical Therapy  Evaluation    Marcie Bazan   MRN: [id number]   Admitting Diagnosis: Decompensated hepatic cirrhosis    PT Received On: 11/26/17  PT Start Time: 0827     PT Stop Time: 0842    PT Total Time (min): 15 min       Billable Minutes:  Evaluation 15 Min    Diagnosis: Decompensated hepatic cirrhosis      Past Medical History:   Diagnosis Date    Alcohol abuse     Alcoholic hepatitis     Anemia of chronic disease     Coagulopathy     End stage liver disease     Hypertension     Hyponatremia     Liver cirrhosis     Liver transplant candidate       Past Surgical History:   Procedure Laterality Date    BREAST SURGERY      CHOLECYSTECTOMY      HYSTERECTOMY         Referring physician: Jared Miller MD  Date referred to PT: 11/24/17    General Precautions: Standard, fall  Orthopedic Precautions:     Braces:         Do you have any cultural, spiritual, Mandaeism conflicts, given your current situation?: none stated    Patient History:  Lives With: child(sae), adult, grandchild(sae)  Living Arrangements: house  Home Accessibility:  (1 threshold to enter)  Transportation Available: family or friend will provide  Living Environment Comment: Pt lives w/ daughter and 2 grandchildren in a [facility] w/ 1 threshold to enter. Daughter is currently 9 months pregnant.  Equipment Currently Used at Home: rollator  DME owned (not currently used): rollator (OT note reports other DME not stated during PT eval)    Previous Level of Function:  Ambulation Skills: needs device  Transfer Skills: needs device  ADL Skills: independent  Work/Leisure Activity: independent    Subjective:  Communicated with nurse prior to session.  Patient is pleasant and agreeable to therapy session. Patient has difficult time processing the function of the bowel management system.   Chief Complaint: pain and decreased functional mobility,   Patient goals: to get better    Pain/Comfort  Pain Rating 1: 3/10  Location - Orientation 1:  generalized  Location 1: abdomen      Objective:   Patient found with: bowel management system     Cognitive Exam:  Oriented to: Person and Place    Follows Commands/attention: Follows two-step commands  Communication: clear/fluent  Safety awareness/insight to disability: impaired    Physical Exam:  Postural examination/scapula alignment: Rounded shoulder    Skin integrity: Visible skin intact  Edema: None noted     Sensation:   Intact    Upper Extremity Range of Motion:  Right Upper Extremity: WFL  Left Upper Extremity: WFL    Upper Extremity Strength:  Right Upper Extremity: WFL  Left Upper Extremity: WFL    Lower Extremity Range of Motion:  Right Lower Extremity: WFL  Left Lower Extremity: WFL    Lower Extremity Strength: (grossly 3+/5)  Right Lower Extremity: WFL  Left Lower Extremity: WFL     Fine motor coordination:  Intact    Gross motor coordination: WFL    Functional Mobility:  Bed Mobility:  Rolling/Turning to Left: Stand by assistance  Rolling/Turning Right: Stand by assistance  Scooting/Bridging: Stand by Assistance  Supine to Sit: Minimum Assistance  Sit to Supine: Contact Guard Assistance    Transfers:  Sit <> Stand Assistance: Contact Guard Assistance  Sit <> Stand Assistive Device: No Assistive Device    Gait:   Gait Distance: 20' in room  Assistance 1: Minimum assistance  Gait Assistive Device: No device  Gait Pattern: full weightbearing  Gait Deviation(s): decreased lisbet, decreased step length    Balance:   Static Sit: FAIR+: Able to take MINIMAL challenges from all directions  Dynamic Sit: FAIR+: Maintains balance through MINIMAL excursions of active trunk motion  Static Stand: FAIR: Maintains without assist but unable to take challenges  Dynamic stand: POOR: N/A    Therapeutic Activities and Exercises:  PT [place] completed    AM-PAC 6 CLICK MOBILITY  How much help from another person does this patient currently need?   1 = Unable, Total/Dependent Assistance  2 = A lot, Maximum/Moderate  Assistance  3 = A little, Minimum/Contact Guard/Supervision  4 = None, Modified [place]/Independent    Turning over in bed (including adjusting bedclothes, sheets and blankets)?: 4  Sitting down on and standing up from a chair with arms (e.g., wheelchair, bedside commode, etc.): 3  Moving from lying on back to sitting on the side of the bed?: 3  Moving to and from a bed to a chair (including a wheelchair)?: 3  Need to walk in hospital room?: 3  Climbing 3-5 steps with a railing?: 3  Total Score: 19     AM-PAC Raw Score CMS G-Code Modifier Level of Impairment Assistance   6 % Total / Unable   7 - 9 CM 80 - 100% Maximal Assist   10 - 14 CL 60 - 80% Moderate Assist   15 - 19 CK 40 - 60% Moderate Assist   20 - 22 CJ 20 - 40% Minimal Assist   23 CI 1-20% SBA / CGA   24 CH 0% Independent/ Mod I     Patient left supine with all lines intact, call button in reach, nurse notified and friend present.    Assessment:   Marcie Bazan is a 52 y.o. female with a medical diagnosis of Decompensated hepatic cirrhosis and presents with weakness, impaired cognition, and decreased functional mobility. Patient is not fully oriented and does not fully comprehend the function of the bowel management system. Patient's friend is present at bedside to assist w/ patient history. OT and PT most recent notes vary in regards to available DME. Patient requires CGA for all out of bed activity and is limited by her endurance. Patient will benefit from skilled PT services to address her functional mobility, improve independence, and to decrease caregiver burden. Patient's medical status is evolving and her eval complexity is lo2.     Rehab identified problem list/impairments: Rehab identified problem list/impairments: weakness, impaired endurance, impaired self care skills, impaired functional mobilty, gait instability, impaired balance, decreased lower extremity function, decreased safety awareness, edema    Rehab potential is  good.    Activity tolerance: Good    Discharge recommendations: Discharge Facility/Level Of Care Needs: nursing facility, skilled     Barriers to discharge: Barriers to Discharge: Decreased caregiver support    Equipment recommendations: Equipment Needed After Discharge: bedside commode, shower chair, walker, rolling     GOALS:    Physical Therapy Goals        Problem: Physical Therapy Goal    Goal Priority Disciplines Outcome Goal Variances Interventions   Physical Therapy Goal     PT/OT, PT Ongoing (interventions implemented as appropriate)     Description:  Goals to be met by: 17    Patient will increase functional independence with mobility by performin. Supine to sit with Set-up [place]  2. Sit to supine with Set-up [place]  3. Sit to stand transfer with Supervision  4. Bed to chair transfer with Supervision using Rolling Walker  5. Gait  x 100 feet with Stand-by Assistance using Rolling Walker.   6. Lower extremity exercise program x20 reps per handout, with independence                      PLAN:    Patient to be seen 4 x/week to address the above listed problems via gait training, therapeutic activities, therapeutic exercises, neuromuscular re-education  Plan of Care expires: 17  Plan of Care reviewed with: patient, daughter          Khoi Melendrez, PT  2017

## 2017-11-26 NOTE — ASSESSMENT & PLAN NOTE
Initially admitted to [facility] with decompensation. Has been attending etoh rehab.   Previously deferred due to lack of caregiver plan but this seems more stable now per our social work.  Will need to complete inpatient evaluation and present to committee.    Plan:  - continue Prednisone Taper  - obtain addiction psych and social work consults - likely after weekend  - continue lactulose and rifaximin, titrate to 3-4 BMs/day  - Obtain C Diff studies w/ new foul smelling output and concernf o r underlying infection    - CMP and INR daily  - continue treatment for likely UTI and f/u on Cx results, sensitivities, and susceptibilities   - follow cultures  - f/u PETH test  - 0.8 L free water restrict, (please ensure enforcement of restriction)  - pt w/ results of + CMV PCR load, agree w/ ID recs, no evidence of end organ damage, monitor for now, will consider treatment down the line if transplant is pursued     Will ask for inpatient liver transplant eval Monday.

## 2017-11-26 NOTE — PLAN OF CARE
51 y/o female admitted with alcoholic hepatitis.  She has been found to have low level CMV viremia and a positive CMV IgM.  CMV treatment is not indicated at this time.  Patient has a reactivation of latent chronic CMV infection.  Research suggests that this happens in up to 30% of acutely ill adults.  Re-consult us if she a liver transplant is planned imminently and will re-consider treatment at that time.      We will sign off.  Thanks for the consult.

## 2017-11-27 PROBLEM — F10.20 ALCOHOL USE DISORDER, SEVERE, DEPENDENCE: Status: ACTIVE | Noted: 2017-11-27

## 2017-11-27 PROBLEM — D84.9 IMMUNOSUPPRESSED STATUS: Status: ACTIVE | Noted: 2017-11-27

## 2017-11-27 LAB
ALBUMIN SERPL BCP-MCNC: 2.3 G/DL
ALBUMIN SERPL BCP-MCNC: 3.1 G/DL
ALP SERPL-CCNC: 249 U/L
ALP SERPL-CCNC: 286 U/L
ALT SERPL W/O P-5'-P-CCNC: 255 U/L
ALT SERPL W/O P-5'-P-CCNC: 316 U/L
ANION GAP SERPL CALC-SCNC: 10 MMOL/L
ANION GAP SERPL CALC-SCNC: 10 MMOL/L
ANISOCYTOSIS BLD QL SMEAR: SLIGHT
AST SERPL-CCNC: 228 U/L
AST SERPL-CCNC: 268 U/L
BACTERIA BLD CULT: NORMAL
BACTERIA BLD CULT: NORMAL
BACTERIA FLD AEROBE CULT: NO GROWTH
BACTERIA UR CULT: NORMAL
BASOPHILS # BLD AUTO: ABNORMAL K/UL
BASOPHILS NFR BLD: 0 %
BILIRUB SERPL-MCNC: 25.7 MG/DL
BILIRUB SERPL-MCNC: 27.6 MG/DL
BUN SERPL-MCNC: 19 MG/DL
BUN SERPL-MCNC: 23 MG/DL
BURR CELLS BLD QL SMEAR: ABNORMAL
CALCIUM SERPL-MCNC: 8.5 MG/DL
CALCIUM SERPL-MCNC: 9 MG/DL
CHLORIDE SERPL-SCNC: 89 MMOL/L
CHLORIDE SERPL-SCNC: 90 MMOL/L
CO2 SERPL-SCNC: 23 MMOL/L
CO2 SERPL-SCNC: 23 MMOL/L
CREAT SERPL-MCNC: 0.8 MG/DL
CREAT SERPL-MCNC: 0.9 MG/DL
DIFFERENTIAL METHOD: ABNORMAL
EOSINOPHIL # BLD AUTO: ABNORMAL K/UL
EOSINOPHIL NFR BLD: 0 %
ERYTHROCYTE [DISTWIDTH] IN BLOOD BY AUTOMATED COUNT: 23.7 %
EST. GFR  (AFRICAN AMERICAN): >60 ML/MIN/1.73 M^2
EST. GFR  (AFRICAN AMERICAN): >60 ML/MIN/1.73 M^2
EST. GFR  (NON AFRICAN AMERICAN): >60 ML/MIN/1.73 M^2
EST. GFR  (NON AFRICAN AMERICAN): >60 ML/MIN/1.73 M^2
GLUCOSE SERPL-MCNC: 116 MG/DL
GLUCOSE SERPL-MCNC: 97 MG/DL
GRAM STN SPEC: NORMAL
GRAM STN SPEC: NORMAL
HCT VFR BLD AUTO: 27.8 %
HGB BLD-MCNC: 10.4 G/DL
HYPOCHROMIA BLD QL SMEAR: ABNORMAL
IMM GRANULOCYTES # BLD AUTO: ABNORMAL K/UL
IMM GRANULOCYTES NFR BLD AUTO: ABNORMAL %
INR PPP: 2.4
LYMPHOCYTES # BLD AUTO: ABNORMAL K/UL
LYMPHOCYTES NFR BLD: 6 %
MAGNESIUM SERPL-MCNC: 1.9 MG/DL
MCH RBC QN AUTO: 30.1 PG
MCHC RBC AUTO-ENTMCNC: 37.4 G/DL
MCV RBC AUTO: 81 FL
MONOCYTES # BLD AUTO: ABNORMAL K/UL
MONOCYTES NFR BLD: 5 %
NEUTROPHILS NFR BLD: 89 %
NRBC BLD-RTO: 0 /100 WBC
OVALOCYTES BLD QL SMEAR: ABNORMAL
PHOSPHATE SERPL-MCNC: 2.8 MG/DL
PLATELET # BLD AUTO: 88 K/UL
PMV BLD AUTO: 10.4 FL
POIKILOCYTOSIS BLD QL SMEAR: SLIGHT
POLYCHROMASIA BLD QL SMEAR: ABNORMAL
POTASSIUM SERPL-SCNC: 3.1 MMOL/L
POTASSIUM SERPL-SCNC: 3.4 MMOL/L
PREALB SERPL-MCNC: 4 MG/DL
PROT SERPL-MCNC: 5.2 G/DL
PROT SERPL-MCNC: 5.5 G/DL
PROTHROMBIN TIME: 24.5 SEC
RBC # BLD AUTO: 3.45 M/UL
SODIUM SERPL-SCNC: 122 MMOL/L
SODIUM SERPL-SCNC: 123 MMOL/L
TARGETS BLD QL SMEAR: ABNORMAL
TSH SERPL DL<=0.005 MIU/L-ACNC: 0.69 UIU/ML
WBC # BLD AUTO: 10.49 K/UL

## 2017-11-27 PROCEDURE — 80053 COMPREHEN METABOLIC PANEL: CPT | Mod: 91

## 2017-11-27 PROCEDURE — 80053 COMPREHEN METABOLIC PANEL: CPT

## 2017-11-27 PROCEDURE — 99232 SBSQ HOSP IP/OBS MODERATE 35: CPT | Mod: ,,, | Performed by: INTERNAL MEDICINE

## 2017-11-27 PROCEDURE — 84443 ASSAY THYROID STIM HORMONE: CPT

## 2017-11-27 PROCEDURE — 25000003 PHARM REV CODE 250: Performed by: STUDENT IN AN ORGANIZED HEALTH CARE EDUCATION/TRAINING PROGRAM

## 2017-11-27 PROCEDURE — 83735 ASSAY OF MAGNESIUM: CPT

## 2017-11-27 PROCEDURE — P9047 ALBUMIN (HUMAN), 25%, 50ML: HCPCS | Performed by: HOSPITALIST

## 2017-11-27 PROCEDURE — 85025 COMPLETE CBC W/AUTO DIFF WBC: CPT

## 2017-11-27 PROCEDURE — 90792 PSYCH DIAG EVAL W/MED SRVCS: CPT | Mod: AF,HB,, | Performed by: PSYCHIATRY & NEUROLOGY

## 2017-11-27 PROCEDURE — 63600175 PHARM REV CODE 636 W HCPCS: Performed by: INTERNAL MEDICINE

## 2017-11-27 PROCEDURE — 25000003 PHARM REV CODE 250: Performed by: HOSPITALIST

## 2017-11-27 PROCEDURE — 11000001 HC ACUTE MED/SURG PRIVATE ROOM

## 2017-11-27 PROCEDURE — 85610 PROTHROMBIN TIME: CPT

## 2017-11-27 PROCEDURE — 36415 COLL VENOUS BLD VENIPUNCTURE: CPT

## 2017-11-27 PROCEDURE — 84134 ASSAY OF PREALBUMIN: CPT

## 2017-11-27 PROCEDURE — 84100 ASSAY OF PHOSPHORUS: CPT

## 2017-11-27 PROCEDURE — 63600175 PHARM REV CODE 636 W HCPCS: Performed by: HOSPITALIST

## 2017-11-27 PROCEDURE — 97116 GAIT TRAINING THERAPY: CPT

## 2017-11-27 PROCEDURE — 99233 SBSQ HOSP IP/OBS HIGH 50: CPT | Mod: ,,, | Performed by: HOSPITALIST

## 2017-11-27 PROCEDURE — 97530 THERAPEUTIC ACTIVITIES: CPT

## 2017-11-27 RX ADMIN — ALBUMIN (HUMAN) 25 G: 12.5 SOLUTION INTRAVENOUS at 01:11

## 2017-11-27 RX ADMIN — PANTOPRAZOLE SODIUM 40 MG: 40 TABLET, DELAYED RELEASE ORAL at 08:11

## 2017-11-27 RX ADMIN — LACTULOSE 20 G: 20 SOLUTION ORAL at 09:11

## 2017-11-27 RX ADMIN — CEFTRIAXONE SODIUM 1 G: 1 INJECTION, POWDER, FOR SOLUTION INTRAMUSCULAR; INTRAVENOUS at 10:11

## 2017-11-27 RX ADMIN — POTASSIUM BICARBONATE 50 MEQ: 25 TABLET, EFFERVESCENT ORAL at 09:11

## 2017-11-27 RX ADMIN — LACTULOSE 20 G: 20 SOLUTION ORAL at 06:11

## 2017-11-27 RX ADMIN — RIFAXIMIN 550 MG: 550 TABLET ORAL at 09:11

## 2017-11-27 RX ADMIN — PREDNISONE 15 MG: 5 TABLET ORAL at 08:11

## 2017-11-27 RX ADMIN — ALBUMIN (HUMAN) 25 G: 12.5 SOLUTION INTRAVENOUS at 09:11

## 2017-11-27 RX ADMIN — ALBUMIN (HUMAN) 25 G: 12.5 SOLUTION INTRAVENOUS at 06:11

## 2017-11-27 RX ADMIN — RIFAXIMIN 550 MG: 550 TABLET ORAL at 08:11

## 2017-11-27 RX ADMIN — PHYTONADIONE 10 MG: 10 INJECTION, EMULSION INTRAMUSCULAR; INTRAVENOUS; SUBCUTANEOUS at 08:11

## 2017-11-27 RX ADMIN — LACTULOSE 20 G: 20 SOLUTION ORAL at 01:11

## 2017-11-27 NOTE — PLAN OF CARE
Problem: Physical Therapy Goal  Goal: Physical Therapy Goal  Goals to be met by: 17    Patient will increase functional independence with mobility by performin. Supine to sit with Set-up [place]  2. Sit to supine with Set-up [place]  3. Sit to stand transfer with Supervision  4. Bed to chair transfer with Supervision using Rolling Walker  5. Gait  x 100 feet with Stand-by Assistance using Rolling Walker.   6. Lower extremity exercise program x20 reps per handout, with independence     Outcome: Ongoing (interventions implemented as appropriate)  No goals met this visit; continue current POC.     Tracey Munoz PT, DPT   2017  Pager: [phone]

## 2017-11-27 NOTE — ASSESSMENT & PLAN NOTE
- on 800 cc fluid restriction; adding albumin today; 120 currently; needs to be 125 for transplant

## 2017-11-27 NOTE — SUBJECTIVE & OBJECTIVE
Interval History: patient's C. Diff came back negative; put out a liter in stool over night; patient is AAO times 3, did not know year; not eating very much currently; boost encouraged;   -PT recs SN  - awaiting addiction psych consult today to determine if patient is a better candidate for liver transplant with her recent short stay in inpatient alcohol program prior to being re-admitted; PETH pending; also getting social work consult; appears to have a good care giver  - if psych believes she has improved her candidacy, then will finish her evaluation, otherwise patient may be hospice    Review of Systems   Constitutional: Negative for chills and fever.   HENT: Negative for rhinorrhea and sore throat.    Eyes: Negative for pain and discharge.   Respiratory: Negative for cough and shortness of breath.    Cardiovascular: Negative for chest pain and leg swelling.   Gastrointestinal: Positive for abdominal distention and diarrhea. Negative for abdominal pain, blood in stool, nausea and vomiting.   Endocrine: Negative for cold intolerance and heat intolerance.   Genitourinary: Negative for dysuria and flank pain.   Neurological: Negative for dizziness and numbness.   Psychiatric/Behavioral: Positive for confusion. Negative for behavioral problems.     Objective:     Vital Signs (Most Recent):  Temp: 97.9 °F (36.6 °C) (11/27/17 1214)  Pulse: 81 (11/27/17 1214)  Resp: 20 (11/27/17 1214)  BP: 120/73 (11/27/17 1214)  SpO2: (!) 93 % (11/27/17 1214) Vital Signs (24h Range):  Temp:  [97.4 °F (36.3 °C)-97.9 °F (36.6 °C)] 97.9 °F (36.6 °C)  Pulse:  [78-85] 81  Resp:  [16-20] 20  SpO2:  [93 %-100 %] 93 %  BP: (112-125)/(55-73) 120/73     Weight: 69.6 kg (153 lb 7 oz)  Body mass index is 25.53 kg/m².    Intake/Output Summary (Last 24 hours) at 11/27/17 1257  Last data filed at 11/27/17 0616   Gross per 24 hour   Intake              310 ml   Output              300 ml   Net               10 ml      Physical Exam   Constitutional:  She appears well-developed and well-nourished.   HENT:   Head: Normocephalic and atraumatic.   Eyes: Conjunctivae are normal. Pupils are equal, round, and reactive to light. Scleral icterus is present.   Neck: Normal range of motion. No thyromegaly present.   Cardiovascular: Normal rate, regular rhythm and normal heart sounds.    Pulmonary/Chest: Effort normal and breath sounds normal.   Abdominal: Soft. She exhibits distension. There is no tenderness.   Musculoskeletal: Normal range of motion. She exhibits no edema.   Neurological: She is alert.   AAO times 2   Skin: No erythema. No pallor.       MELD-Na score: 33 at 11/27/2017  4:01 AM  MELD score: 29 at 11/27/2017  4:01 AM  Calculated from:  Serum Creatinine: 0.9 mg/dL (Rounded to 1) at 11/27/2017  4:01 AM  Serum Sodium: 122 mmol/L (Rounded to 125) at 11/27/2017  4:01 AM  Total Bilirubin: 25.7 mg/dL at 11/27/2017  4:01 AM  INR(ratio): 2.4 at 11/27/2017  4:01 AM  Age: 52 years    Significant Labs:  CBC:    Recent Labs  Lab 11/26/17  0550 11/27/17  0401   WBC 11.34 10.49   HGB 12.0 10.4*   HCT 32.0* 27.8*   PLT 71* 88*     CMP:    Recent Labs  Lab 11/26/17  0550 11/26/17  1601 11/27/17  0401   * 120* 122*   K 3.6 3.4* 3.1*   CL 90* 88* 89*   CO2 21* 21* 23    116* 97   BUN 26* 26* 23*   CREATININE 0.8 0.9 0.9   CALCIUM 8.4* 8.4* 8.5*   PROT 5.6* 5.7* 5.2*   ALBUMIN 2.0* 2.0* 2.3*   BILITOT 25.1* 26.2* 25.7*   ALKPHOS 340* 331* 286*   * 319* 268*   * 387* 316*   ANIONGAP 9 11 10   EGFRNONAA >60.0 >60.0 >60.0     PTINR:    Recent Labs  Lab 11/26/17  0550 11/26/17  1601 11/27/17  0401   INR 2.3* 2.3* 2.4*       Significant Procedures:   Dobutamine Stress Test with Color Flow:   Results for orders placed or performed during the hospital encounter of 10/19/17   Dobutamine Stress Test w/ Color Flow   Result Value Ref Range    EF 73 55 - 65    Diastolic Dysfunction No     Est. PA Systolic Pressure 21.32     Tricuspid Valve Regurgitation TRIVIAL

## 2017-11-27 NOTE — PROGRESS NOTES
MD notified that patient unable to void 6-8 hours after last straight cath performed.  Patient catheterized with a result of 450cc of tea colored urine, tolerated well.  No new orders at this time.  Will continue to monitor.

## 2017-11-27 NOTE — PROGRESS NOTES
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 Patient presented to [facility] with generalized weakness and was admitted to  11/10 for hyponatremia seen on labs in GI office. Discharged on 11/15 and returned on 11/20 with 1 week worsening weakness, malaise, lethargy, and decreased appetite. Denied fever/chills, abdominal pain, bright red or black tarry stools, hematemesis or other abnormal bleeding, confusion or disorientation, changes in urination, light-headedness, headache, changes in vision, dyspnea, chest pain or palpitations. Labs demonstrated Na 116 and MELD 32; patient admitted for hyponatremia. Nephrology consulted, recommended fluid restriction , holding diuretics, and prednisode taper that started 11/23 (decreased from 40mg QD to 20mg Qd.) Patient transferred to [facility] for revaluation of liver disease. Of note, paracentesis performed 11/22, removed 2L, negative for SBP.     Follows with Dr. Souza for cirrhosis. Since early 09/2017, patient's liver failure progressing indicated by worsening clinical signs (abdominal distention, weakness/fatigue) and MELD. She had been evaluated by [facility] liver transplant committee 10/25/2017 and declined transplant candidacy 2/2 continued EtOH use and lack of solid caregiver support/plan.She has since been enrolled in in-patient substance abuse program at [facility] where she has remained since early November; per patient, her last EtOH beverage was  "11/01/2017.  Per chart review on 11/22/17:     "Patient's case was discussed in liver selection committee today. Per committee discussion, the SW will call patient's daughter to discuss caregiver plan at this time. If the caregiver plan is adequate, patient will need to follow up with either/both addiction psych or SW regarding substance abuse and plan. May need to do this inpatient if patient is still admitted."        Denies history of GI bleed, SBP, renal disease, or other complications of cirrhosis. Liver biopsy 10/24 demonstrated stage 4 cirrhosis. Records reports EGD in August 2016 that showed small esophageal varices with severe gastritis. Colonoscopy 2015 with 2 polyps and internal hemorrhoids Started on prednisone 40mg QD on 10/25; was on spironolactone, has been held since 11/10 for hypoNa; on lactulose.    Interval History:   Remains very lethargic but arousable . BMS in place w/ foul smelling discharge 1100cc stool OP/last 24hrs, C Diff (-)     Current Facility-Administered Medications   Medication    albumin human 25% bottle 25 g    cefTRIAXone (ROCEPHIN) 1 g in dextrose 5 % 50 mL IVPB    lactulose 20 gram/30 mL solution Soln 20 g    ondansetron disintegrating tablet 4 mg    pantoprazole EC tablet 40 mg    phytonadione vitamin k (AQUA-MEPHYTON) 10 mg in dextrose 5 % 50 mL IVPB    potassium bicarbonate disintegrating tablet 50 mEq    predniSONE tablet 15 mg    rifAXIMin tablet 550 mg    sodium chloride 0.9% flush 3 mL       Objective:     Vital Signs (Most Recent):  Temp: 97.7 °F (36.5 °C) (11/27/17 0722)  Pulse: 78 (11/27/17 0722)  Resp: 17 (11/27/17 0722)  BP: (!) 112/55 (11/27/17 0722)  SpO2: 99 % (11/27/17 0722) Vital Signs (24h Range):  Temp:  [97.4 °F (36.3 °C)-97.8 °F (36.6 °C)] 97.7 °F (36.5 °C)  Pulse:  [78-88] 78  Resp:  [16-18] 17  SpO2:  [97 %-100 %] 99 %  BP: (112-125)/(55-68) 112/55     Weight: 69.6 kg (153 lb 7 oz) (11/23/17 5248)  Body mass index is 25.53 kg/m².    Physical Exam "   Constitutional:   Confused ; appears very frail   HENT:   Head: Normocephalic.   Eyes: Scleral icterus is present.   Cardiovascular: Normal rate.    Pulmonary/Chest: Effort normal.   Abdominal: Soft.   + fluid wave ; nontender   Neurological:    very slow to respond and difficult to arouse but oriented to person, place, time    Vitals reviewed.      MELD-Na score: 33 at 11/27/2017  4:01 AM  MELD score: 29 at 11/27/2017  4:01 AM  Calculated from:  Serum Creatinine: 0.9 mg/dL (Rounded to 1) at 11/27/2017  4:01 AM  Serum Sodium: 122 mmol/L (Rounded to 125) at 11/27/2017  4:01 AM  Total Bilirubin: 25.7 mg/dL at 11/27/2017  4:01 AM  INR(ratio): 2.4 at 11/27/2017  4:01 AM  Age: 52 years    Significant Labs:  CBC:     Recent Labs  Lab 11/27/17  0401   WBC 10.49   RBC 3.45*   HGB 10.4*   HCT 27.8*   PLT 88*     CMP:     Recent Labs  Lab 11/27/17  0401   GLU 97   CALCIUM 8.5*   ALBUMIN 2.3*   PROT 5.2*   *   K 3.1*   CO2 23   CL 89*   BUN 23*   CREATININE 0.9   ALKPHOS 286*   *   *   BILITOT 25.7*     Coagulation:   Recent Labs  Lab 11/23/17  2335  11/27/17  0401   INR 2.2*  < > 2.4*   APTT 36.1*  --   --    < > = values in this interval not displayed.    Significant Imaging:  Labs: Reviewed    Review of Systems      Assessment/Plan:     * Decompensated hepatic cirrhosis    Initially admitted to [facility] with decompensation. Has been attending etoh rehab.   Previously deferred due to lack of caregiver plan but this seems more stable now per our social work.  Will need to complete inpatient evaluation and present to committee (planning to present Wednesday)    Plan:  - continue Prednisone Taper  - obtain addiction psych and social work consults (ordered & pending)  - continue lactulose and rifaximin, titrate to 3-4 BMs/day  - C Diff (-)  - CMP and INR daily  - UCx + w/ >100,000 cfu/ml E Coli sensitive to rocephin, continue rocephin (day 3 today)   - f/u PETH test frm 11/24 (pending)  - 0.8 L free water  restrict, (please ensure enforcement of restriction)  - pt w/ results of + CMV PCR load, agree w/ ID recs, no evidence of end organ damage, monitor for now, will consider treatment down the line if transplant is pursued   - Paracentesis today (ordered)  - If transplant is pursued pt still needs EGD, C-Scope, Mammogram to complete workup   - f/u TSH    Addiction psych today, S.W. Has evaluated the pt; will plan on presenting to transplant committee Wednesday                     Thank you for your consult. I will follow-up with patient. Please contact us if you have any additional questions.    Magan Lazcano MD  Hepatology  [facility]

## 2017-11-27 NOTE — ASSESSMENT & PLAN NOTE
MELD-Na score: 33 at 11/27/2017  4:01 AM  MELD score: 29 at 11/27/2017  4:01 AM  Calculated from:  Serum Creatinine: 0.9 mg/dL (Rounded to 1) at 11/27/2017  4:01 AM  Serum Sodium: 122 mmol/L (Rounded to 125) at 11/27/2017  4:01 AM  Total Bilirubin: 25.7 mg/dL at 11/27/2017  4:01 AM  INR(ratio): 2.4 at 11/27/2017  4:01 AM  Age: 52 years  - hepatology consulted and patient transferred to [facility]  - will ask for clearance for inpatient liver tx evaluation tomorrow  - had half of her evaluation completed on last admission  - will get addiction psych consult for today, PETH pending; based on psych re-evaluation, will determine if patient is a suitable candidate for liver tx; patient was declined last admit

## 2017-11-27 NOTE — PT/OT/SLP PROGRESS
Physical Therapy  Treatment    Marcie Bazan   MRN: [id number]   Admitting Diagnosis: Decompensated hepatic cirrhosis    PT Received On: 11/27/17  PT Start Time: 0848     PT Stop Time: 0928    PT Total Time (min): 40 min       Billable Minutes:  Gait [id number] min and Therapeutic Activity 25 min    Treatment Type: Treatment  PT/PTA: PT     PTA Visit Number: 0       General Precautions: Standard, fall  Orthopedic Precautions: N/A   Braces: N/A    Do you have any cultural, spiritual, Rastafari conflicts, given your current situation?: none stated    Subjective:  Communicated with RN prior to session. Pt agreeable to participate in therapy session. Pt with confusion, oriented to person only.    Pain/Comfort  Pain Rating 1: 0/10  Pain Rating Post-Intervention 1: 0/10    Objective:   Patient found with: bowel management system    Functional Mobility:  Bed Mobility:   Rolling/Turning Right: Minimum assistance  Supine to Sit: Moderate Assistance    Transfers:  Sit <> Stand Assistance: Minimum Assistance  Sit <> Stand Assistive Device: Rolling Walker    Gait:   Gait Distance: 30 ft. with RW and min A for balance, safety, and RW management  Assistance 1: Minimum assistance  Gait Assistive Device: Rolling walker  Gait Pattern: 2-point gait  Gait Deviation(s): decreased lisbet, decreased step length, decreased stride length, decreased weight-shifting ability    Balance:   Static Sit: FAIR: Maintains without assist, but unable to take any challenges   Dynamic Sit: FAIR: Cannot move trunk without losing balance  Static Stand: POOR+: Needs MINIMAL assist to maintain  Dynamic stand: POOR: needs minimal assist during gait      Therapeutic Activities and Exercises:  Therapeutic activities aimed to increase pt's independence, safety, and efficiency with bed mobility and functional transfers. See above for assistance levels. Pt required increased time and cueing (verbal and tactile) for sequencing of gross motor tasks. Pt with  delayed processing of commands and inconsistent ability to follow one and two step commands. Activities also aimed to improve gross motor coordination and functional BLE strength. Pt performed ankle pumps, LAQ and marches x 10 reps with frequent cueing for redirection to activity, technique of exercise, and counting of repetitions.     Therapist facilitated progression of gait training to improve gait stability, endurance, and independence with functional ambulation.  Pt with increased pathway deviation and poor RW management- therapist required mod verbal and tactile cueing for direction, RW management, and safety with ambulation.     AM-PAC 6 CLICK MOBILITY  How much help from another person does this patient currently need?   1 = Unable, Total/Dependent Assistance  2 = A lot, Maximum/Moderate Assistance  3 = A little, Minimum/Contact Guard/Supervision  4 = None, Modified [place]/Independent    Turning over in bed (including adjusting bedclothes, sheets and blankets)?: 3  Sitting down on and standing up from a chair with arms (e.g., wheelchair, bedside commode, etc.): 3  Moving from lying on back to sitting on the side of the bed?: 2  Moving to and from a bed to a chair (including a wheelchair)?: 3  Need to walk in hospital room?: 3  Climbing 3-5 steps with a railing?: 2  Total Score: 16    AM-PAC Raw Score CMS G-Code Modifier Level of Impairment Assistance   6 % Total / Unable   7 - 9 CM 80 - 100% Maximal Assist   10 - 14 CL 60 - 80% Moderate Assist   15 - 19 CK 40 - 60% Moderate Assist   20 - 22 CJ 20 - 40% Minimal Assist   23 CI 1-20% SBA / CGA   24 CH 0% Independent/ Mod I     Patient left up in chair with all lines intact, call button in reach, RN notified and PCT present.    Assessment:  Marcie Bazan is a 52 y.o. female with a medical diagnosis of Decompensated hepatic cirrhosis. Pt presents with increased confusion, difficulty attending to task, and delayed processing of commands. Pt with  gait instability, decreased safety awareness, and generalized weakness. Pt would benefit from continued PT intervention to address below listed deficits and maximize return to PLOF.       Rehab identified problem list/impairments: Rehab identified problem list/impairments: weakness, impaired endurance, impaired functional mobilty, gait instability, impaired balance, impaired self care skills, impaired cognition    Rehab potential is good.    Activity tolerance: Good    Discharge recommendations: Discharge Facility/Level Of Care Needs: nursing facility, skilled     Barriers to discharge: Barriers to Discharge: Decreased caregiver support    Equipment recommendations: Equipment Needed After Discharge: bedside commode, walker, rolling, shower chair     GOALS:    Physical Therapy Goals        Problem: Physical Therapy Goal    Goal Priority Disciplines Outcome Goal Variances Interventions   Physical Therapy Goal     PT/OT, PT Ongoing (interventions implemented as appropriate)     Description:  Goals to be met by: 17    Patient will increase functional independence with mobility by performin. Supine to sit with Set-up [place]  2. Sit to supine with Set-up [place]  3. Sit to stand transfer with Supervision  4. Bed to chair transfer with Supervision using Rolling Walker  5. Gait  x 100 feet with Stand-by Assistance using Rolling Walker.   6. Lower extremity exercise program x20 reps per handout, with independence                      PLAN:    Patient to be seen 4 x/week  to address the above listed problems via gait training, therapeutic activities, therapeutic exercises, neuromuscular re-education  Plan of Care expires: 17  Plan of Care reviewed with: patient        Traceyluis Munoz, PT, DPT   2017  Pager: [phone]

## 2017-11-27 NOTE — ASSESSMENT & PLAN NOTE
On chronic prednisone for alcoholic hepatitis; currently 15 mg, would go down by 5 weekly, next change would be 10 mg this saturday

## 2017-11-27 NOTE — ASSESSMENT & PLAN NOTE
MELD-Na score: 33 at 11/26/2017  4:01 PM  MELD score: 28 at 11/26/2017  4:01 PM  Calculated from:  Serum Creatinine: 0.9 mg/dL (Rounded to 1) at 11/26/2017  4:01 PM  Serum Sodium: 120 mmol/L (Rounded to 125) at 11/26/2017  4:01 PM  Total Bilirubin: 26.2 mg/dL at 11/26/2017  4:01 PM  INR(ratio): 2.3 at 11/26/2017  4:01 PM  Age: 52 years  - hepatology consulted and patient transferred to [facility]  - will ask for clearance for inpatient liver tx evaluation tomorrow  - had half of her evaluation completed on last admission  - will get addiction psych consult for the AM, PETH pending;

## 2017-11-27 NOTE — SUBJECTIVE & OBJECTIVE
Patient History           Medical as of 11/27/2017     Past Medical History     Diagnosis Date Comments Source    Alcohol abuse -- -- Provider    Alcoholic hepatitis -- -- Provider    Anemia of chronic disease -- -- Provider    Coagulopathy -- -- Provider    End stage liver disease -- -- Provider    Hypertension -- -- Provider    Hyponatremia -- -- Provider    Liver cirrhosis -- -- Provider    Liver transplant candidate -- -- Provider                  Surgical as of 11/27/2017     Past Surgical History     Procedure Laterality Date Comments Source    CHOLECYSTECTOMY -- -- -- Provider    HYSTERECTOMY -- -- -- Provider    BREAST SURGERY -- -- -- Provider                  Family as of 11/27/2017     Problem Relation Name Age of Onset Comments Source    Hypertension Mother -- -- -- Provider    Hypertension Father -- -- -- Provider    Diabetes Father -- -- -- Provider    Diabetes Sister -- -- -- Provider    Depression Maternal Grandfather -- -- -- Provider            Tobacco Use as of 11/27/2017     Smoking Status Smoking Start Date Smoking Quit Date Packs/day Years Used    Never Smoker -- -- -- --    Types Comments Smokeless Tobacco Status Smokeless Tobacco Quit Date Source    -- -- Never Used -- Provider            Alcohol Use as of 11/27/2017     Alcohol Use Drinks/Week Alcohol/Week Comments Source    Yes -- -- Currently admitted to inpatient rehab Provider            Drug Use as of 11/27/2017     Drug Use Types Frequency Comments Source    No -- -- -- Provider            Sexual Activity as of 11/27/2017     Sexually Active Birth Control Partners Comments Source    Not Currently -- -- -- Provider            Activities of Daily Living as of 11/27/2017    **None**           Social Documentation as of 11/27/2017    **None**           Occupational as of 11/27/2017    **None**           Socioeconomic as of 11/27/2017     Marital Status Spouse Name Number of Children Years Education Preferred Language Ethnicity Race  Source    Single -- -- -- English /Black Black or  --         Pertinent History Q A Comments    as of 11/27/2017 Lives with      Place in Birth Order      Lives in      Number of Siblings      Raised by      Legal Involvement      Childhood Trauma      Criminal History of      Financial Status      Highest Level of Education      Does patient have access to a firearm?       Service      Primary Leisure Activity      Spirituality       Past Medical History:   Diagnosis Date    Alcohol abuse     Alcoholic hepatitis     Anemia of chronic disease     Coagulopathy     End stage liver disease     Hypertension     Hyponatremia     Liver cirrhosis     Liver transplant candidate      Past Surgical History:   Procedure Laterality Date    BREAST SURGERY      CHOLECYSTECTOMY      HYSTERECTOMY       Family History     Problem Relation (Age of Onset)    Depression Maternal Grandfather    Diabetes Father, Sister    Hypertension Mother, Father        Social History Main Topics    Smoking status: Never Smoker    Smokeless tobacco: Never Used    Alcohol use Yes      Comment: Currently admitted to inpatient rehab    Drug use: No    Sexual activity: Not Currently     Review of patient's allergies indicates:   Allergen Reactions    Ferrous sulfate Other (See Comments)     Patient states the pill makes her sick. She stated she would rather have a shot       No current facility-administered medications on file prior to encounter.      Current Outpatient Prescriptions on File Prior to Encounter   Medication Sig    cetirizine (ZYRTEC) 10 MG tablet Take 10 mg by mouth once daily.    lactulose (CHRONULAC) 10 gram/15 mL solution TK 45 ML PO QID    lisinopril (PRINIVIL,ZESTRIL) 5 MG tablet Take 1 tablet by mouth only as needed for high blood pressure    omeprazole (PRILOSEC) 40 MG capsule Take 40 mg by mouth once daily.    ondansetron (ZOFRAN-ODT) 4 MG TbDL Take 1 tablet (4 mg  "total) by mouth every 8 (eight) hours as needed (nausea and vomiting).    potassium chloride SA (K-DUR,KLOR-CON) 20 MEQ tablet Taking only when taking lasix    spironolactone (ALDACTONE) 100 MG tablet Hold until follow-up with GI     Psychotherapeutics     None        Review of Systems  Strengths and Liabilities: Strength: Patient has positive support network.    Objective:     Vital Signs (Most Recent):  Temp: 97.7 °F (36.5 °C) (11/27/17 0722)  Pulse: 78 (11/27/17 0722)  Resp: 17 (11/27/17 0722)  BP: (!) 112/55 (11/27/17 0722)  SpO2: 99 % (11/27/17 0722) Vital Signs (24h Range):  Temp:  [97.4 °F (36.3 °C)-97.8 °F (36.6 °C)] 97.7 °F (36.5 °C)  Pulse:  [78-88] 78  Resp:  [16-18] 17  SpO2:  [97 %-100 %] 99 %  BP: (112-125)/(55-68) 112/55     Height: 5' 5" (165.1 cm)  Weight: 69.6 kg (153 lb 7 oz)  Body mass index is 25.53 kg/m².      Intake/Output Summary (Last 24 hours) at 11/27/17 0911  Last data filed at 11/27/17 0616   Gross per 24 hour   Intake              430 ml   Output             1800 ml   Net            -1370 ml       Physical Exam    Mental Status Exam  General appearance and behavior: No acute distress, disheveled, scleral icterus, minimally cooperative, not engaged  Level of Consciousness: drowsy  Attention: easily distractible  Orientation: intact to self, place, situation; unable to state date but reports has been hospitalized since Thanksgiving   Psychomotor Behavior: no retardation or agitation  Speech:  mumbled  Language: non-spontaneous, appropriate without evidence of neologisms or gross idiosyncrasies   Mood: "tired"   Affect: constricted  Thought Process: clear, logical, goal directed, without evidence of unwarranted suspicion, over generalization, or fragmentation, no flight of ideas   Associations: intact, no loosening of associations   Thought Content: denies suicidal/homicidal ideation, denies plan or intent for self harm or harm to others; no evidence of hallucinations, or " delusions  Memory: registers 3/3, recalls 1/3  Fund of Knowledge: appropriate for setting  Abstraction: intact to conversation  Calculation/Concentration: impaired  Insight: intact, appropriate,  Pt has awareness of illness  Judgment: fair    Significant Labs:   Last 24 Hours:   Recent Lab Results       11/27/17  0401 11/26/17  1601 11/26/17  1513      Immature Granulocytes CANCELED  Comment:  Result canceled by the ancillary       Immature Grans (Abs) CANCELED  Comment:  Result canceled by the ancillary       Albumin 2.3(L) 2.0(L)      Alkaline Phosphatase 286(H) 331(H)      (H) 387(H)      Anion Gap 10 11      Aniso Slight       (H) 319(H)      Baso # CANCELED  Comment:  Result canceled by the ancillary       Basophil% 0.0       Total Bilirubin 25.7  Comment:  For infants and newborns, interpretation of results should be based  on gestational age, weight and in agreement with clinical  observations.  Premature Infant recommended reference ranges:  Up to 24 hours.............<8.0 mg/dL  Up to 48 hours............<12.0 mg/dL  3-5 days..................<15.0 mg/dL  6-29 days.................<15.0 mg/dL  (H) 26.2  Comment:  For infants and newborns, interpretation of results should be based  on gestational age, weight and in agreement with clinical  observations.  Premature Infant recommended reference ranges:  Up to 24 hours.............<8.0 mg/dL  Up to 48 hours............<12.0 mg/dL  3-5 days..................<15.0 mg/dL  6-29 days.................<15.0 mg/dL  (H)      BUN, Bld 23(H) 26(H)      Battiest Cells Occasional       C difficile Toxins A+B, EIA   Negative  Comment:  Testing not recommended for children <24 months old.     C. diff Antigen   Negative     Calcium 8.5(L) 8.4(L)      Chloride 89(L) 88(L)      CO2 23 21(L)      Creatinine 0.9 0.9      Differential Method Automated       eGFR if  >60.0 >60.0      eGFR if non  >60.0  Comment:  Calculation used to obtain  the estimated glomerular filtration  rate (eGFR) is the CKD-EPI equation.    >60.0  Comment:  Calculation used to obtain the estimated glomerular filtration  rate (eGFR) is the CKD-EPI equation.         Eos # CANCELED  Comment:  Result canceled by the ancillary       Eosinophil% 0.0       Glucose 97 116(H)      Gran% 89.0(H)       Hematocrit 27.8(L)       Hemoglobin 10.4(L)       Hypo Occasional       Coumadin Monitoring INR 2.4  Comment:  Coumadin Therapy:  2.0 - 3.0 for INR for all indicators except mechanical heart valves  and antiphospholipid syndromes which should use 2.5 - 3.5.  (H) 2.3  Comment:  Coumadin Therapy:  2.0 - 3.0 for INR for all indicators except mechanical heart valves  and antiphospholipid syndromes which should use 2.5 - 3.5.  (H)      Lymph # CANCELED  Comment:  Result canceled by the ancillary       Lymph% 6.0(L)       Magnesium 1.9       MCH 30.1       MCHC 37.4(H)       MCV 81(L)       Mono # CANCELED  Comment:  Result canceled by the ancillary       Mono% 5.0       MPV 10.4       nRBC 0       Ovalocytes Occasional       Phosphorus 2.8       Platelets 88(L)       Poik Slight       Poly Occasional       Potassium 3.1(L) 3.4(L)      Prealbumin 4(L)       Total Protein 5.2(L) 5.7(L)      Protime 24.5(H) 22.9(H)      RBC 3.45(L)       RDW 23.7(H)       Sodium 122(L) 120(L)      Target Cells Occasional       WBC 10.49             Significant Imaging: I have reviewed all pertinent imaging results/findings within the past 24 hours.

## 2017-11-27 NOTE — PROGRESS NOTES
[facility] Hospital Medicine  Progress Note    Patient Name: Marcie Bazan  MRN: [id number]  Patient Class: IP- Inpatient   Admission Date: 11/23/2017  Length of Stay: 4 days  Attending Physician: Burton Vora MD  Primary Care Provider: Milton Ferrer, [facility] Medicine Team: [facility] HOSP MED A Burton Vora MD    Subjective:     Principal Problem:Decompensated hepatic cirrhosis    HPI:  52 year old female with PMHx of 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 Patient presented to [facility] with generalized weakness. Onset two months ago, progressively worsening. Was admitted to  11/10 for hyponatremia seen on labs in GI office. Discharged on 11/15 and returned on 11/20 with 1 week worsening weakness, malaise, lethargy, and decreased appetite. Denied fever/chills, abdominal pain, bright red or black tarry stools, hematemesis or other abnormal bleeding, confusion or disorientation, changes in urination, light-headedness, headache, changes in vision, dyspnea, chest pain or palpitations. Labs demonstrated Na 116 and MELD 32; patient admitted for hyponatremia. Nephrology consulted, recommended fluid restriction , holding diuretics, and prednisode taper that started 11/23 (decreased from 40mg QD to 20mg Qd.) Patient transferred to [facility] for revaluation of liver disease. Of note, paracentesis performed 11/22, removed 2L, negative for SBP.    Follows with Dr. Souza for cirrhosis. Since early 09/2017, patient's liver failure progressing indicated by worsening clinical signs (abdominal distention, weakness/fatigue) and MELD. In mid-October, patient's PETH demonstrated EtOH use and she was denied candidacy for liver transplant. She was then enrolled in in-patient substance abuse program at [facility] where she has remained since early  November; per patient, her last EtOH beverage was 11/01/2017. Denies history of GI bleed, SBP, renal disease, or other complications of cirrhosis. Liver biopsy 10/24 demonstrated stage 4 cirrhosis. Records reports EGD in August 2016 that showed small esophageal varices with severe gastritis. Colonoscopy 2015 with 2 polyps and internal hemorrhoids Started on prednisone 40mg QD on 10/25; was on spironolactone, has been held since 11/10 for hypoNa; on lactulose.    Hospital Course:  11/25/2017: Mental status improving with hepatic encephalopathy treatment. Started empiric ceftriaxone given leukocytosis in immunocompromised state. CMV PCR positive overnight, for which ID was consulted    Interval History: patient's C. Diff came back negative; put out a liter in stool over night; patient is AAO times 3, did not know year; not eating very much currently; boost encouraged;   -PT recs SN  - awaiting addiction psych consult today to determine if patient is a better candidate for liver transplant with her recent short stay in inpatient alcohol program prior to being re-admitted; PETH pending; also getting social work consult; appears to have a good care giver  - if psych believes she has improved her candidacy, then will finish her evaluation, otherwise patient may be hospice    Review of Systems   Constitutional: Negative for chills and fever.   HENT: Negative for rhinorrhea and sore throat.    Eyes: Negative for pain and discharge.   Respiratory: Negative for cough and shortness of breath.    Cardiovascular: Negative for chest pain and leg swelling.   Gastrointestinal: Positive for abdominal distention and diarrhea. Negative for abdominal pain, blood in stool, nausea and vomiting.   Endocrine: Negative for cold intolerance and heat intolerance.   Genitourinary: Negative for dysuria and flank pain.   Neurological: Negative for dizziness and numbness.   Psychiatric/Behavioral: Positive for confusion. Negative for behavioral  problems.     Objective:     Vital Signs (Most Recent):  Temp: 97.9 °F (36.6 °C) (11/27/17 1214)  Pulse: 81 (11/27/17 1214)  Resp: 20 (11/27/17 1214)  BP: 120/73 (11/27/17 1214)  SpO2: (!) 93 % (11/27/17 1214) Vital Signs (24h Range):  Temp:  [97.4 °F (36.3 °C)-97.9 °F (36.6 °C)] 97.9 °F (36.6 °C)  Pulse:  [78-85] 81  Resp:  [16-20] 20  SpO2:  [93 %-100 %] 93 %  BP: (112-125)/(55-73) 120/73     Weight: 69.6 kg (153 lb 7 oz)  Body mass index is 25.53 kg/m².    Intake/Output Summary (Last 24 hours) at 11/27/17 1257  Last data filed at 11/27/17 0616   Gross per 24 hour   Intake              310 ml   Output              300 ml   Net               10 ml      Physical Exam   Constitutional: She appears well-developed and well-nourished.   HENT:   Head: Normocephalic and atraumatic.   Eyes: Conjunctivae are normal. Pupils are equal, round, and reactive to light. Scleral icterus is present.   Neck: Normal range of motion. No thyromegaly present.   Cardiovascular: Normal rate, regular rhythm and normal heart sounds.    Pulmonary/Chest: Effort normal and breath sounds normal.   Abdominal: Soft. She exhibits distension. There is no tenderness.   Musculoskeletal: Normal range of motion. She exhibits no edema.   Neurological: She is alert.   AAO times 2   Skin: No erythema. No pallor.       MELD-Na score: 33 at 11/27/2017  4:01 AM  MELD score: 29 at 11/27/2017  4:01 AM  Calculated from:  Serum Creatinine: 0.9 mg/dL (Rounded to 1) at 11/27/2017  4:01 AM  Serum Sodium: 122 mmol/L (Rounded to 125) at 11/27/2017  4:01 AM  Total Bilirubin: 25.7 mg/dL at 11/27/2017  4:01 AM  INR(ratio): 2.4 at 11/27/2017  4:01 AM  Age: 52 years    Significant Labs:  CBC:    Recent Labs  Lab 11/26/17  0550 11/27/17  0401   WBC 11.34 10.49   HGB 12.0 10.4*   HCT 32.0* 27.8*   PLT 71* 88*     CMP:    Recent Labs  Lab 11/26/17  0550 11/26/17  1601 11/27/17  0401   * 120* 122*   K 3.6 3.4* 3.1*   CL 90* 88* 89*   CO2 21* 21* 23    116* 97    BUN 26* 26* 23*   CREATININE 0.8 0.9 0.9   CALCIUM 8.4* 8.4* 8.5*   PROT 5.6* 5.7* 5.2*   ALBUMIN 2.0* 2.0* 2.3*   BILITOT 25.1* 26.2* 25.7*   ALKPHOS 340* 331* 286*   * 319* 268*   * 387* 316*   ANIONGAP 9 11 10   EGFRNONAA >60.0 >60.0 >60.0     PTINR:    Recent Labs  Lab 11/26/17  0550 11/26/17  1601 11/27/17  0401   INR 2.3* 2.3* 2.4*       Significant Procedures:   Dobutamine Stress Test with Color Flow:   Results for orders placed or performed during the hospital encounter of 10/19/17   Dobutamine Stress Test w/ Color Flow   Result Value Ref Range    EF 73 55 - 65    Diastolic Dysfunction No     Est. PA Systolic Pressure 21.32     Tricuspid Valve Regurgitation TRIVIAL          Assessment/Plan:      * Decompensated hepatic cirrhosis    MELD-Na score: 33 at 11/27/2017  4:01 AM  MELD score: 29 at 11/27/2017  4:01 AM  Calculated from:  Serum Creatinine: 0.9 mg/dL (Rounded to 1) at 11/27/2017  4:01 AM  Serum Sodium: 122 mmol/L (Rounded to 125) at 11/27/2017  4:01 AM  Total Bilirubin: 25.7 mg/dL at 11/27/2017  4:01 AM  INR(ratio): 2.4 at 11/27/2017  4:01 AM  Age: 52 years  - hepatology consulted and patient transferred [facility]  - will ask for clearance for inpatient liver tx evaluation tomorrow  - had half of her evaluation completed on last admission  - will get addiction psych consult for today, PETH pending; based on psych re-evaluation, will determine if patient is a suitable candidate for liver tx; patient was declined last admit        Immunosuppressed status    On chronic prednisone for alcoholic hepatitis; currently 15 mg, would go down by 5 weekly, next change would be 10 mg this saturday          Coagulopathy    - vitamin k for 3 days          Alcoholic cirrhosis of liver with ascites    - see decompensated cirrhosis           Cytomegalovirus (CMV) viremia    -- ID evaluated and do not feel it needs to be treated        Thrombocytopenia    -- monitor           Abnormal liver enzymes    --  Stable  -- Etiology liver failure; see that section for more details          Alcoholic hepatitis with ascites- decompensated with elevated MELD    - cont tapering prednisone, currently at 15 mg, go down to 10 mg on saturday          Acute on chronic hyponatremia    - on 800 cc fluid restriction; on albumin; improved from 120 to 122, cont albumin for another day        End stage liver disease              HCC (hepatocellular carcinoma)    AFP 9; seen on CT on 10/19, 1 cm focus, monitor for now          Hepatic encephalopathy    -- Improving, though not yet returned to baseline, AAO times 3; put out 1 L in flexi-seal  -- Continue lactulose and rifaximin          Severe protein-calorie malnutrition    PAB, boost and dietary consult          Ascites    -- Holding diuretics at present, s/p 2L removed on 11/22 with testing negative for SBP  -- No abdominal pain today              Essential hypertension    - Stable  - Holding home lisinopril in setting of decompensated liver disease          Anemia of chronic disease    - monitor daily            VTE Risk Mitigation         Ordered     Place sequential compression device  Until discontinued      11/24/17 0734     Place FABIOLA hose  Until discontinued      11/23/17 2305     Medium Risk of VTE  Once      11/23/17 2305              Burton Vora MD  Department of Hospital Medicine   [facility]

## 2017-11-27 NOTE — ASSESSMENT & PLAN NOTE
- Patient moderate risk from addiction standpoint. Patient was actively drinking as of 1 month ago despite known medical consequences and was evasive regarding use and history, giving inconsistent information to caregivers. Was seen for initial transplant evaluation by addiction psychiatry on 10/20/17 and recommended re-evaluation after completion of residential treatment. Patient did enter residential treatment at [facility] at the beginning of November, indicating improved insight and per collateral was engaged and looking forward to returning to the program.   - Have contacted transplant SW office and will review [facility] records once available.  - F/u PETH drawn 11/24, PETH 53, 68 on 10/19.

## 2017-11-27 NOTE — HPI
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 Originally presented to OSH on 11/20/17 and was noted she has been residing at [facility] "since early November." On 11/24/17 KARLA Macdonald confirmed patient had been attending inpatient addiction services at [facility] located at [address] [place] [address] (ph#[phone]) and requested records from [facility] staff, Joellen, who agreed to send records asap. Psychiatry was consulted for liver transplant re-evaluation. Patient initially seen by Addiction Psychiatry for liver transplant evaluation on 10/20/17:  "Patient states that her last drink was in 2014 following a visit with her PCP to evaluate developing ascites and a lesion was discovered on her liver (however she informed the hospitalist on the unit that her last drink was 6 months ago). She states that prior to this, she drank intermittently, usually when a close friend would come over and they would drink together, making mixed drinks out of vodka and mixers in the . She is evasive about the amount of vodka she would consume at these times."   Per chart, PETH later returned positive at 53. Upon re-evaluation by psychiatry on 10/25/17, "Patient denies drinking despite positive Peth, upon further questioning patient stated, 'I may have drank and forgot, or it could be rum cake.'" Recommend re-evaluation after completion of residential treatment, 6 months of documented sobriety with serial Peth tests and continued AA involvement.   On interview, patient sleeping but awakens with verbal stimuli, requires frequent prompting to respond to questions. She says she has been in residential rehab since 11/1/17 which was the date of her last drink, but " "reports "I couldn't do it" that there was "too much activity." Unable to describe program further, continued to repeat "I couldn't do it." When asked how she feels about maintaining sobriety now responds "i'll have to." Attempted to discuss alternative plans for substance abuse treatment given extent of her illness, patient repeated "my doctors don't want me to go."     Spoke with patient's sister Patricia Bazan ([phone]) briefly by phone, who reports patient was hospitalized twice during her stay at the program but that "she was enjoying it." Friend at bedside also confirmed patient wanted to return to program after hospitalization to finish something she was working on and that both of the patient's sisters have been involved in her care.     The following obtained primarily from chart review given patient's mental status with updates where applicable.    Medical Review Of Systems:  + lethargy, scleral icterus     Psychiatric Review Of Systems:  Denies symptoms of depression, anxiety, auditory or visual hallucinations, suicidal or homicidal ideation      Allergies:  Ferrous sulfate     Substance Abuse History:  Substance of Choice: alcohol--vodka   Substances Used: no history of illicit drug use  History of IVDU?: No  Use of Alcohol: Yes   Tobacco: No  History of Withdrawals: No  History of Detox: No  Rehab History: Yes, began [facility] residential program 11/1/17 but unable to complete 2/2 medical decompensation. No prior treatment  Patient aware of biomedical complications? Yes, previously reported patient does not seem to think that she was drinking a necessary amount to cause these symptoms   1st informed of impending organ failure - 2014  Pt made the following lifestyle adjustments - previously reported she stopped drinking, however + PETHs; entered residential treatment on 11/1/17  How? Support of family and program  When was the subject first of transplantation first broached - October 2017  View of AA - " never attended  Has addiction affected your organ failure - minimized  Last use of substances - 11/1/17  Axis I? - denies  Understand need for lifetime medication? yes  Expectations - acceptable  Social support - sisters; Yani plans to take leave from school to be primary caregiver  Including post-op - sisters Yani and Patricia     Abuse Criteria:  Failure at work, school or home:  No  Use when physically hazardous:  No  Legal Problems:  No  Use despite recurrent social/interpersonal problems:  No     Substance Use Disorder Criteria:  1. Often take in larger amounts or over a longer period of time than was intended: yes  2. Persistent desire or unsuccessful efforts to cut down or control use: yes  3. Great deal of time spent in activities necessary to obtain substance, use, or recover from effects: Denies  4. Craving/strong desire for substance or urge to use: Denies  5. Use resulting in failure to fulfill major role obligations at home, work or school: No longer able to cook for entire family as she used to due to health problems caused by alcohol   6. Social, occupational, recreational activities decreased because of use: patient describes how she would frequently be in bed during parties and family gatherings  7. Continued use despite having persistent or recurrent social or interpersonal problems cause or exaserbated by the substance: yes- patient speaks at length about problems caused by nephews alcoholism but does not show insight her own  8. Recurrent use in situations in which it is physically hazardous: denied  9. Use despite physical or psychological problems that are likely to have been caused or exacerbated by the substance: Yes - continued to use alcohol even after signs of liver failure manifested   10. Tolerance: No (denies)   11. Withdrawal: No (denies)   Mild (1-3), Moderate (4-5), Severe (?6)    Psychiatric History:  Previous Medication Trials: no   Previous Psychiatric Hospitalizations:  "no   Previous Suicide Attempts: no   History of Violence: no  Outpatient Psychiatrist: no     Social History:  Marital Status: single  Children: 2   Employment Status: unemployed; per  note: "Patient is not working at this time.  Patient reports she last worked about 3-4 years ago, when she became sick, but could not give a diagnosis reason of what caused her not to work. 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  Pt worked for [employer] in [facility] [employer] [facility]."  Education: some college  Special Ed: no   history: none  Housing Status: section 8 housing in [place], stays with daughter frequently   Financial status: stable   Leisure/recreation: time with family   Childhood history: normal childhood   History of abuse: no  Access to gun: unknown     Legal History:  Past Charges/Incarcerations: no    Pending charges: no      Family Psychiatric History:   Nephew with alcohol use disorder, father was alcoholic   "

## 2017-11-27 NOTE — SUBJECTIVE & OBJECTIVE
Interval History: patient is having extremely foul smelling BMs and there is a concern for C. Diff, sample has been sent and results pending;   - will ask for insurance for clearance for inpatient liver transplant evaluation tomorrow  - addiction psych has been consulted, PETH pending;   - did complete most of her evaluation on last admission    Review of Systems   Constitutional: Negative for chills and fever.   HENT: Negative for rhinorrhea and sore throat.    Eyes: Negative for pain and discharge.   Respiratory: Negative for cough and shortness of breath.    Cardiovascular: Negative for chest pain and leg swelling.   Gastrointestinal: Positive for abdominal distention and diarrhea. Negative for abdominal pain, blood in stool, nausea and vomiting.   Endocrine: Negative for cold intolerance and heat intolerance.   Genitourinary: Negative for dysuria and flank pain.   Neurological: Negative for dizziness and numbness.   Psychiatric/Behavioral: Positive for confusion. Negative for behavioral problems.     Objective:     Vital Signs (Most Recent):  Temp: 97.7 °F (36.5 °C) (11/26/17 2005)  Pulse: 85 (11/26/17 2005)  Resp: 16 (11/26/17 2005)  BP: (!) 117/56 (11/26/17 2005)  SpO2: 100 % (11/26/17 2005) Vital Signs (24h Range):  Temp:  [96.2 °F (35.7 °C)-97.7 °F (36.5 °C)] 97.7 °F (36.5 °C)  Pulse:  [84-91] 85  Resp:  [16-18] 16  SpO2:  [96 %-100 %] 100 %  BP: (101-121)/(52-58) 117/56     Weight: 69.6 kg (153 lb 7 oz)  Body mass index is 25.53 kg/m².    Intake/Output Summary (Last 24 hours) at 11/26/17 2041  Last data filed at 11/26/17 1235   Gross per 24 hour   Intake              300 ml   Output             3400 ml   Net            -3100 ml      Physical Exam   Constitutional: She appears well-developed and well-nourished.   HENT:   Head: Normocephalic and atraumatic.   Eyes: Conjunctivae are normal. Pupils are equal, round, and reactive to light. Scleral icterus is present.   Neck: Normal range of motion. No  thyromegaly present.   Cardiovascular: Normal rate, regular rhythm and normal heart sounds.    Pulmonary/Chest: Effort normal and breath sounds normal.   Abdominal: Soft. She exhibits distension. There is no tenderness.   Musculoskeletal: Normal range of motion. She exhibits no edema.   Neurological: She is alert.   AAO times 2   Skin: No erythema. No pallor.       MELD-Na score: 33 at 11/26/2017  4:01 PM  MELD score: 28 at 11/26/2017  4:01 PM  Calculated from:  Serum Creatinine: 0.9 mg/dL (Rounded to 1) at 11/26/2017  4:01 PM  Serum Sodium: 120 mmol/L (Rounded to 125) at 11/26/2017  4:01 PM  Total Bilirubin: 26.2 mg/dL at 11/26/2017  4:01 PM  INR(ratio): 2.3 at 11/26/2017  4:01 PM  Age: 52 years    Significant Labs:  CBC:    Recent Labs  Lab 11/25/17  0358 11/26/17  0550   WBC 13.95* 11.34   HGB 11.5* 12.0   HCT 30.7* 32.0*   PLT 87* 71*     CMP:    Recent Labs  Lab 11/25/17  1525 11/26/17  0550 11/26/17  1601   * 120* 120*   K 3.7 3.6 3.4*   CL 89* 90* 88*   CO2 21* 21* 21*   * 106 116*   BUN 24* 26* 26*   CREATININE 0.9 0.8 0.9   CALCIUM 8.6* 8.4* 8.4*   PROT 5.7* 5.6* 5.7*   ALBUMIN 2.1* 2.0* 2.0*   BILITOT 25.1* 25.1* 26.2*   ALKPHOS 336* 340* 331*   * 299* 319*   * 377* 387*   ANIONGAP 11 9 11   EGFRNONAA >60.0 >60.0 >60.0     PTINR:    Recent Labs  Lab 11/25/17  1525 11/26/17  0550 11/26/17  1601   INR 2.5* 2.3* 2.3*       Significant Procedures:   Dobutamine Stress Test with Color Flow: Results for orders placed or performed during the hospital encounter of 10/19/17   Dobutamine Stress Test w/ Color Flow   Result Value Ref Range    EF 73 55 - 65    Diastolic Dysfunction No     Est. PA Systolic Pressure 21.32     Tricuspid Valve Regurgitation TRIVIAL

## 2017-11-27 NOTE — SUBJECTIVE & OBJECTIVE
Interval History:   Remains very lethargic but arousable . BMS in place w/ foul smelling discharge 1100cc stool OP/last 24hrs, C Diff (-)     Current Facility-Administered Medications   Medication    albumin human 25% bottle 25 g    cefTRIAXone (ROCEPHIN) 1 g in dextrose 5 % 50 mL IVPB    lactulose 20 gram/30 mL solution Soln 20 g    ondansetron disintegrating tablet 4 mg    pantoprazole EC tablet 40 mg    phytonadione vitamin k (AQUA-MEPHYTON) 10 mg in dextrose 5 % 50 mL IVPB    potassium bicarbonate disintegrating tablet 50 mEq    predniSONE tablet 15 mg    rifAXIMin tablet 550 mg    sodium chloride 0.9% flush 3 mL       Objective:     Vital Signs (Most Recent):  Temp: 97.7 °F (36.5 °C) (11/27/17 0722)  Pulse: 78 (11/27/17 0722)  Resp: 17 (11/27/17 0722)  BP: (!) 112/55 (11/27/17 0722)  SpO2: 99 % (11/27/17 0722) Vital Signs (24h Range):  Temp:  [97.4 °F (36.3 °C)-97.8 °F (36.6 °C)] 97.7 °F (36.5 °C)  Pulse:  [78-88] 78  Resp:  [16-18] 17  SpO2:  [97 %-100 %] 99 %  BP: (112-125)/(55-68) 112/55     Weight: 69.6 kg (153 lb 7 oz) (11/23/17 2147)  Body mass index is 25.53 kg/m².    Physical Exam   Constitutional:   Confused ; appears very frail   HENT:   Head: Normocephalic.   Eyes: Scleral icterus is present.   Cardiovascular: Normal rate.    Pulmonary/Chest: Effort normal.   Abdominal: Soft.   + fluid wave ; nontender   Neurological:    very slow to respond and difficult to arouse but oriented to person, place, time    Vitals reviewed.      MELD-Na score: 33 at 11/27/2017  4:01 AM  MELD score: 29 at 11/27/2017  4:01 AM  Calculated from:  Serum Creatinine: 0.9 mg/dL (Rounded to 1) at 11/27/2017  4:01 AM  Serum Sodium: 122 mmol/L (Rounded to 125) at 11/27/2017  4:01 AM  Total Bilirubin: 25.7 mg/dL at 11/27/2017  4:01 AM  INR(ratio): 2.4 at 11/27/2017  4:01 AM  Age: 52 years    Significant Labs:  CBC:     Recent Labs  Lab 11/27/17  0401   WBC 10.49   RBC 3.45*   HGB 10.4*   HCT 27.8*   PLT 88*     CMP:      Recent Labs  Lab 11/27/17  0401   GLU 97   CALCIUM 8.5*   ALBUMIN 2.3*   PROT 5.2*   *   K 3.1*   CO2 23   CL 89*   BUN 23*   CREATININE 0.9   ALKPHOS 286*   *   *   BILITOT 25.7*     Coagulation:   Recent Labs  Lab 11/23/17  2335  11/27/17  0401   INR 2.2*  < > 2.4*   APTT 36.1*  --   --    < > = values in this interval not displayed.    Significant Imaging:  Labs: Reviewed    Review of Systems

## 2017-11-27 NOTE — CONSULTS
"[facility]  Psychiatry  Consult Note    Patient Name: Marcie Bazan  MRN: [id number]   Code Status: Full Code  Admission Date: 11/23/2017  Hospital Length of Stay: 4 days  Attending Physician: Burton Vora MD  Primary Care Provider: ODILIA Wall    Current Legal Status: N/A    Patient information was obtained from patient, relative(s), caregiver / friend, past medical records and ER records.   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 Originally presented to OSH on 11/20/17 and was noted she has been residing at [facility] "since early November." On 11/24/17 KARLA Macdonald confirmed patient had been attending inpatient addiction services at [facility] located at [address] [place] [address] (ph#[phone]) and requested records from [facility] staff, Joellen, who agreed to send records asap. Psychiatry was consulted for liver transplant re-evaluation. Patient initially seen by Addiction Psychiatry for liver transplant evaluation on 10/20/17:  "Patient states that her last drink was in 2014 following a visit with her PCP to evaluate developing ascites and a lesion was discovered on her liver (however she informed the hospitalist on the unit that her last drink was 6 months ago). She states that prior to this, she drank intermittently, usually when a close friend would come over and they would drink together, making mixed drinks out of vodka and mixers in the " ". She is evasive about the amount of vodka she would consume at these times."   Per chart, PETH later returned positive at 53. Upon re-evaluation by psychiatry on 10/25/17, "Patient denies drinking despite positive Peth, upon further questioning patient stated, 'I may have drank and forgot, or it could be rum cake.'" Recommend re-evaluation after completion of residential treatment, 6 months of documented sobriety with serial Peth tests and continued AA involvement.   On interview, patient sleeping but awakens with verbal stimuli, requires frequent prompting to respond to questions. She says she has been in residential rehab since 11/1/17 which was the date of her last drink, but reports "I couldn't do it" that there was "too much activity." Unable to describe program further, continued to repeat "I couldn't do it." When asked how she feels about maintaining sobriety now responds "i'll have to." Attempted to discuss alternative plans for substance abuse treatment given extent of her illness, patient repeated "my doctors don't want me to go."     Spoke with patient's sister Patricia Bazan ([phone]) briefly by phone, who reports patient was hospitalized twice during her stay at the program but that "she was enjoying it." Friend at bedside also confirmed patient wanted to return to program after hospitalization to finish something she was working on and that both of the patient's sisters have been involved in her care.     The following obtained primarily from chart review given patient's mental status with updates where applicable.    Medical Review Of Systems:  + lethargy, scleral icterus     Psychiatric Review Of Systems:  Denies symptoms of depression, anxiety, auditory or visual hallucinations, suicidal or homicidal ideation      Allergies:  Ferrous sulfate     Substance Abuse History:  Substance of Choice: alcohol--vodka   Substances Used: no history of illicit drug use  History of IVDU?: No  Use of " Alcohol: Yes   Tobacco: No  History of Withdrawals: No  History of Detox: No  Rehab History: Yes, began [facility] residential program 11/1/17 but unable to complete 2/2 medical decompensation. No prior treatment  Patient aware of biomedical complications? Yes, previously reported patient does not seem to think that she was drinking a necessary amount to cause these symptoms   1st informed of impending organ failure - 2014  Pt made the following lifestyle adjustments - previously reported she stopped drinking, however + PETHs; entered residential treatment on 11/1/17  How? Support of family and program  When was the subject first of transplantation first broached - October 2017  View of AA - never attended  Has addiction affected your organ failure - minimized  Last use of substances - 11/1/17  Axis I? - denies  Understand need for lifetime medication? yes  Expectations - acceptable  Social support - sisters; Yani plans to take leave from school to be primary caregiver  Including post-op - sisters Yani and Patricia     Abuse Criteria:  Failure at work, school or home:  No  Use when physically hazardous:  No  Legal Problems:  No  Use despite recurrent social/interpersonal problems:  No     Substance Use Disorder Criteria:  1. Often take in larger amounts or over a longer period of time than was intended: yes  2. Persistent desire or unsuccessful efforts to cut down or control use: yes  3. Great deal of time spent in activities necessary to obtain substance, use, or recover from effects: Denies  4. Craving/strong desire for substance or urge to use: Denies  5. Use resulting in failure to fulfill major role obligations at home, work or school: No longer able to cook for entire family as she used to due to health problems caused by alcohol   6. Social, occupational, recreational activities decreased because of use: patient describes how she would frequently be in bed during parties and family gatherings  7. Continued use  "despite having persistent or recurrent social or interpersonal problems cause or exaserbated by the substance: yes- patient speaks at length about problems caused by nephews alcoholism but does not show insight her own  8. Recurrent use in situations in which it is physically hazardous: denied  9. Use despite physical or psychological problems that are likely to have been caused or exacerbated by the substance: Yes - continued to use alcohol even after signs of liver failure manifested   10. Tolerance: No (denies)   11. Withdrawal: No (denies)   Mild (1-3), Moderate (4-5), Severe (?6)    Psychiatric History:  Previous Medication Trials: no   Previous Psychiatric Hospitalizations: no   Previous Suicide Attempts: no   History of Violence: no  Outpatient Psychiatrist: no     Social History:  Marital Status: single  Children: 2   Employment Status: unemployed; per  note: "Patient is not working at this time.  Patient reports she last worked about 3-4 years ago, when she became sick, but could not give a diagnosis reason of what caused her not to work. 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  Pt worked for [employer] in [facility] [employer] [facility]."  Education: some college  Special Ed: no   history: none  Housing Status: [address] housing in [place], stays with daughter frequently   Financial status: stable   Leisure/recreation: time with family   Childhood history: normal childhood   History of abuse: no  Access to gun: unknown     Legal History:  Past Charges/Incarcerations: no    Pending charges: no      Family Psychiatric History:   Nephew with alcohol use disorder, father was alcoholic     Hospital Course: No notes on file         Patient History           Medical as of 11/27/2017     Past Medical History     Diagnosis Date Comments Source    Alcohol abuse -- -- Provider    Alcoholic hepatitis -- -- Provider    Anemia of chronic disease -- -- Provider    " Coagulopathy -- -- Provider    End stage liver disease -- -- Provider    Hypertension -- -- Provider    Hyponatremia -- -- Provider    Liver cirrhosis -- -- Provider    Liver transplant candidate -- -- Provider                  Surgical as of 11/27/2017     Past Surgical History     Procedure Laterality Date Comments Source    CHOLECYSTECTOMY -- -- -- Provider    HYSTERECTOMY -- -- -- Provider    BREAST SURGERY -- -- -- Provider                  Family as of 11/27/2017     Problem Relation Name Age of Onset Comments Source    Hypertension Mother -- -- -- Provider    Hypertension Father -- -- -- Provider    Diabetes Father -- -- -- Provider    Diabetes Sister -- -- -- Provider    Depression Maternal Grandfather -- -- -- Provider            Tobacco Use as of 11/27/2017     Smoking Status Smoking Start Date Smoking Quit Date Packs/day Years Used    Never Smoker -- -- -- --    Types Comments Smokeless Tobacco Status Smokeless Tobacco Quit Date Source    -- -- Never Used -- Provider            Alcohol Use as of 11/27/2017     Alcohol Use Drinks/Week Alcohol/Week Comments Source    Yes -- -- Currently admitted to inpatient rehab Provider            Drug Use as of 11/27/2017     Drug Use Types Frequency Comments Source    No -- -- -- Provider            Sexual Activity as of 11/27/2017     Sexually Active Birth Control Partners Comments Source    Not Currently -- -- -- Provider            Activities of Daily Living as of 11/27/2017    **None**           Social Documentation as of 11/27/2017    **None**           Occupational as of 11/27/2017    **None**           Socioeconomic as of 11/27/2017     Marital Status Spouse Name Number of Children Years Education Preferred Language Ethnicity Race Source    Single -- -- -- English /Black Black or  --         Pertinent History Q A Comments    as of 11/27/2017 Lives with      Place in Birth Order      Lives in      Number of Siblings      Raised by       Legal Involvement      Childhood Trauma      Criminal History of      Financial Status      Highest Level of Education      Does patient have access to a firearm?       Service      Primary Leisure Activity      Spirituality       Past Medical History:   Diagnosis Date    Alcohol abuse     Alcoholic hepatitis     Anemia of chronic disease     Coagulopathy     End stage liver disease     Hypertension     Hyponatremia     Liver cirrhosis     Liver transplant candidate      Past Surgical History:   Procedure Laterality Date    BREAST SURGERY      CHOLECYSTECTOMY      HYSTERECTOMY       Family History     Problem Relation (Age of Onset)    Depression Maternal Grandfather    Diabetes Father, Sister    Hypertension Mother, Father        Social History Main Topics    Smoking status: Never Smoker    Smokeless tobacco: Never Used    Alcohol use Yes      Comment: Currently admitted to inpatient rehab    Drug use: No    Sexual activity: Not Currently     Review of patient's allergies indicates:   Allergen Reactions    Ferrous sulfate Other (See Comments)     Patient states the pill makes her sick. She stated she would rather have a shot       No current facility-administered medications on file prior to encounter.      Current Outpatient Prescriptions on File Prior to Encounter   Medication Sig    cetirizine (ZYRTEC) 10 MG tablet Take 10 mg by mouth once daily.    lactulose (CHRONULAC) 10 gram/15 mL solution TK 45 ML PO QID    lisinopril (PRINIVIL,ZESTRIL) 5 MG tablet Take 1 tablet by mouth only as needed for high blood pressure    omeprazole (PRILOSEC) 40 MG capsule Take 40 mg by mouth once daily.    ondansetron (ZOFRAN-ODT) 4 MG TbDL Take 1 tablet (4 mg total) by mouth every 8 (eight) hours as needed (nausea and vomiting).    potassium chloride SA (K-DUR,KLOR-CON) 20 MEQ tablet Taking only when taking lasix    spironolactone (ALDACTONE) 100 MG tablet Hold until follow-up with GI  "    Psychotherapeutics     None        Review of Systems  Strengths and Liabilities: Strength: Patient has positive support network.    Objective:     Vital Signs (Most Recent):  Temp: 97.7 °F (36.5 °C) (11/27/17 0722)  Pulse: 78 (11/27/17 0722)  Resp: 17 (11/27/17 0722)  BP: (!) 112/55 (11/27/17 0722)  SpO2: 99 % (11/27/17 0722) Vital Signs (24h Range):  Temp:  [97.4 °F (36.3 °C)-97.8 °F (36.6 °C)] 97.7 °F (36.5 °C)  Pulse:  [78-88] 78  Resp:  [16-18] 17  SpO2:  [97 %-100 %] 99 %  BP: (112-125)/(55-68) 112/55     Height: 5' 5" (165.1 cm)  Weight: 69.6 kg (153 lb 7 oz)  Body mass index is 25.53 kg/m².      Intake/Output Summary (Last 24 hours) at 11/27/17 0911  Last data filed at 11/27/17 0616   Gross per 24 hour   Intake              430 ml   Output             1800 ml   Net            -1370 ml       Physical Exam    Mental Status Exam  General appearance and behavior: No acute distress, disheveled, scleral icterus, minimally cooperative, not engaged  Level of Consciousness: drowsy  Attention: easily distractible  Orientation: intact to self, place, situation; unable to state date but reports has been hospitalized since Thanksgiving   Psychomotor Behavior: no retardation or agitation  Speech:  mumbled  Language: non-spontaneous, appropriate without evidence of neologisms or gross idiosyncrasies   Mood: "tired"   Affect: constricted  Thought Process: clear, logical, goal directed, without evidence of unwarranted suspicion, over generalization, or fragmentation, no flight of ideas   Associations: intact, no loosening of associations   Thought Content: denies suicidal/homicidal ideation, denies plan or intent for self harm or harm to others; no evidence of hallucinations, or delusions  Memory: registers 3/3, recalls 1/3  Fund of Knowledge: appropriate for setting  Abstraction: intact to conversation  Calculation/Concentration: impaired  Insight: intact, appropriate,  Pt has awareness of illness  Judgment: " fair    Significant Labs:   Last 24 Hours:   Recent Lab Results       11/27/17  0401 11/26/17  1601 11/26/17  1513      Immature Granulocytes CANCELED  Comment:  Result canceled by the ancillary       Immature Grans (Abs) CANCELED  Comment:  Result canceled by the ancillary       Albumin 2.3(L) 2.0(L)      Alkaline Phosphatase 286(H) 331(H)      (H) 387(H)      Anion Gap 10 11      Aniso Slight       (H) 319(H)      Baso # CANCELED  Comment:  Result canceled by the ancillary       Basophil% 0.0       Total Bilirubin 25.7  Comment:  For infants and newborns, interpretation of results should be based  on gestational age, weight and in agreement with clinical  observations.  Premature Infant recommended reference ranges:  Up to 24 hours.............<8.0 mg/dL  Up to 48 hours............<12.0 mg/dL  3-5 days..................<15.0 mg/dL  6-29 days.................<15.0 mg/dL  (H) 26.2  Comment:  For infants and newborns, interpretation of results should be based  on gestational age, weight and in agreement with clinical  observations.  Premature Infant recommended reference ranges:  Up to 24 hours.............<8.0 mg/dL  Up to 48 hours............<12.0 mg/dL  3-5 days..................<15.0 mg/dL  6-29 days.................<15.0 mg/dL  (H)      BUN, Bld 23(H) 26(H)      [place] Cells Occasional       C difficile Toxins A+B, EIA   Negative  Comment:  Testing not recommended for children <24 months old.     C. diff Antigen   Negative     Calcium 8.5(L) 8.4(L)      Chloride 89(L) 88(L)      CO2 23 21(L)      Creatinine 0.9 0.9      Differential Method Automated       eGFR if  >60.0 >60.0      eGFR if non  >60.0  Comment:  Calculation used to obtain the estimated glomerular filtration  rate (eGFR) is the CKD-EPI equation.    >60.0  Comment:  Calculation used to obtain the estimated glomerular filtration  rate (eGFR) is the CKD-EPI equation.         Eos # CANCELED  Comment:  Result  canceled by the ancillary       Eosinophil% 0.0       Glucose 97 116(H)      Gran% 89.0(H)       Hematocrit 27.8(L)       Hemoglobin 10.4(L)       Hypo Occasional       Coumadin Monitoring INR 2.4  Comment:  Coumadin Therapy:  2.0 - 3.0 for INR for all indicators except mechanical heart valves  and antiphospholipid syndromes which should use 2.5 - 3.5.  (H) 2.3  Comment:  Coumadin Therapy:  2.0 - 3.0 for INR for all indicators except mechanical heart valves  and antiphospholipid syndromes which should use 2.5 - 3.5.  (H)      Lymph # CANCELED  Comment:  Result canceled by the ancillary       Lymph% 6.0(L)       Magnesium 1.9       MCH 30.1       MCHC 37.4(H)       MCV 81(L)       Mono # CANCELED  Comment:  Result canceled by the ancillary       Mono% 5.0       MPV 10.4       nRBC 0       Ovalocytes Occasional       Phosphorus 2.8       Platelets 88(L)       Poik Slight       Poly Occasional       Potassium 3.1(L) 3.4(L)      Prealbumin 4(L)       Total Protein 5.2(L) 5.7(L)      Protime 24.5(H) 22.9(H)      RBC 3.45(L)       RDW 23.7(H)       Sodium 122(L) 120(L)      Target Cells Occasional       WBC 10.49             Significant Imaging: I have reviewed all pertinent imaging results/findings within the past 24 hours.    Assessment/Plan:     Alcohol use disorder, severe, dependence    - Patient moderate risk from addiction standpoint. Patient was actively drinking as of 1 month ago despite known medical consequences and was evasive regarding use and history, giving inconsistent information to caregivers. Was seen for initial transplant evaluation by addiction psychiatry on 10/20/17 and recommended re-evaluation after completion of residential treatment. Patient did enter residential treatment at [facility] at the beginning of November, indicating improved insight and per collateral was engaged and looking forward to returning to the program.   - Have contacted transplant SW office and will review  [facility] records once available.  - F/u PETH drawn 11/24, PETH 53, 68 on 10/19.           Patient seen and discussed with Dr Avalos.  Total Time:  60 minutes      Sally Coffey MD   Psychiatry  [facility]        Staff note : I, Elvin Avalos MD have personally seen and evaluated the patient , and reviewed the above history and exam. I agree and concur with the above assessment and plan. Pt has some degree of encephalopathy during this eval . Friend at bedside validated that pt twice entered residential alcohol rehab but was too sick to continue . Pt had called him from rehab and seemed to be engaged in the program which to me is hopeful.  Pt has several support members ; daughter , sister who will take leave from [facility] and friend from [place] . While I would have liked her to complete rehab , and evaluated her in a more lucid state , I do believe she has improved insight from initial eval description last month. In summary  , she would be a moderate risk but would rec that she resume rehab post transplant if approved by the committee.

## 2017-11-27 NOTE — PROGRESS NOTES
[facility] Hospital Medicine  Progress Note    Patient Name: Marcie Bazan  MRN: [id number]  Patient Class: IP- Inpatient   Admission Date: 11/23/2017  Length of Stay: 3 days  Attending Physician: Burton Vora MD  Primary Care Provider: Milton Ferrer, [facility] Medicine Team: [facility]OSP MED A Burton Vora MD    Subjective:     Principal Problem:Decompensated hepatic cirrhosis    HPI:  52 year old female with PMHx of 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 Patient presented to [facility] with generalized weakness. Onset two months ago, progressively worsening. Was admitted to  11/10 for hyponatremia seen on labs in GI office. Discharged on 11/15 and returned on 11/20 with 1 week worsening weakness, malaise, lethargy, and decreased appetite. Denied fever/chills, abdominal pain, bright red or black tarry stools, hematemesis or other abnormal bleeding, confusion or disorientation, changes in urination, light-headedness, headache, changes in vision, dyspnea, chest pain or palpitations. Labs demonstrated Na 116 and MELD 32; patient admitted for hyponatremia. Nephrology consulted, recommended fluid restriction , holding diuretics, and prednisode taper that started 11/23 (decreased from 40mg QD to 20mg Qd.) Patient transferred to [facility] for revaluation of liver disease. Of note, paracentesis performed 11/22, removed 2L, negative for SBP.    Follows with Dr. Souza for cirrhosis. Since early 09/2017, patient's liver failure progressing indicated by worsening clinical signs (abdominal distention, weakness/fatigue) and MELD. In mid-October, patient's PETH demonstrated EtOH use and she was denied candidacy for liver transplant. She was then enrolled in in-patient substance abuse program at [facility] where she has remained since early  November; per patient, her last EtOH beverage was 11/01/2017. Denies history of GI bleed, SBP, renal disease, or other complications of cirrhosis. Liver biopsy 10/24 demonstrated stage 4 cirrhosis. Records reports EGD in August 2016 that showed small esophageal varices with severe gastritis. Colonoscopy 2015 with 2 polyps and internal hemorrhoids Started on prednisone 40mg QD on 10/25; was on spironolactone, has been held since 11/10 for hypoNa; on lactulose.    Hospital Course:  11/25/2017: Mental status improving with hepatic encephalopathy treatment. Started empiric ceftriaxone given leukocytosis in immunocompromised state. CMV PCR positive overnight, for which ID was consulted    Interval History: patient is having extremely foul smelling BMs and there is a concern for C. Diff, sample has been sent and results pending;   - will ask for insurance for clearance for inpatient liver transplant evaluation tomorrow  - addiction psych has been consulted, PETH pending;   - did complete most of her evaluation on last admission    Review of Systems   Constitutional: Negative for chills and fever.   HENT: Negative for rhinorrhea and sore throat.    Eyes: Negative for pain and discharge.   Respiratory: Negative for cough and shortness of breath.    Cardiovascular: Negative for chest pain and leg swelling.   Gastrointestinal: Positive for abdominal distention and diarrhea. Negative for abdominal pain, blood in stool, nausea and vomiting.   Endocrine: Negative for cold intolerance and heat intolerance.   Genitourinary: Negative for dysuria and flank pain.   Neurological: Negative for dizziness and numbness.   Psychiatric/Behavioral: Positive for confusion. Negative for behavioral problems.     Objective:     Vital Signs (Most Recent):  Temp: 97.7 °F (36.5 °C) (11/26/17 2005)  Pulse: 85 (11/26/17 2005)  Resp: 16 (11/26/17 2005)  BP: (!) 117/56 (11/26/17 2005)  SpO2: 100 % (11/26/17 2005) Vital Signs (24h Range):  Temp:  [96.2  °F (35.7 °C)-97.7 °F (36.5 °C)] 97.7 °F (36.5 °C)  Pulse:  [84-91] 85  Resp:  [16-18] 16  SpO2:  [96 %-100 %] 100 %  BP: (101-121)/(52-58) 117/56     Weight: 69.6 kg (153 lb 7 oz)  Body mass index is 25.53 kg/m².    Intake/Output Summary (Last 24 hours) at 11/26/17 2041  Last data filed at 11/26/17 1235   Gross per 24 hour   Intake              300 ml   Output             3400 ml   Net            -3100 ml      Physical Exam   Constitutional: She appears well-developed and well-nourished.   HENT:   Head: Normocephalic and atraumatic.   Eyes: Conjunctivae are normal. Pupils are equal, round, and reactive to light. Scleral icterus is present.   Neck: Normal range of motion. No thyromegaly present.   Cardiovascular: Normal rate, regular rhythm and normal heart sounds.    Pulmonary/Chest: Effort normal and breath sounds normal.   Abdominal: Soft. She exhibits distension. There is no tenderness.   Musculoskeletal: Normal range of motion. She exhibits no edema.   Neurological: She is alert.   AAO times 2   Skin: No erythema. No pallor.       MELD-Na score: 33 at 11/26/2017  4:01 PM  MELD score: 28 at 11/26/2017  4:01 PM  Calculated from:  Serum Creatinine: 0.9 mg/dL (Rounded to 1) at 11/26/2017  4:01 PM  Serum Sodium: 120 mmol/L (Rounded to 125) at 11/26/2017  4:01 PM  Total Bilirubin: 26.2 mg/dL at 11/26/2017  4:01 PM  INR(ratio): 2.3 at 11/26/2017  4:01 PM  Age: 52 years    Significant Labs:  CBC:    Recent Labs  Lab 11/25/17  0358 11/26/17  0550   WBC 13.95* 11.34   HGB 11.5* 12.0   HCT 30.7* 32.0*   PLT 87* 71*     CMP:    Recent Labs  Lab 11/25/17  1525 11/26/17  0550 11/26/17  1601   * 120* 120*   K 3.7 3.6 3.4*   CL 89* 90* 88*   CO2 21* 21* 21*   * 106 116*   BUN 24* 26* 26*   CREATININE 0.9 0.8 0.9   CALCIUM 8.6* 8.4* 8.4*   PROT 5.7* 5.6* 5.7*   ALBUMIN 2.1* 2.0* 2.0*   BILITOT 25.1* 25.1* 26.2*   ALKPHOS 336* 340* 331*   * 299* 319*   * 377* 387*   ANIONGAP 11 9 11   EGFRNONAA >60.0  >60.0 >60.0     PTINR:    Recent Labs  Lab 11/25/17  1525 11/26/17  0550 11/26/17  1601   INR 2.5* 2.3* 2.3*       Significant Procedures:   Dobutamine Stress Test with Color Flow: Results for orders placed or performed during the hospital encounter of 10/19/17   Dobutamine Stress Test w/ Color Flow   Result Value Ref Range    EF 73 55 - 65    Diastolic Dysfunction No     Est. PA Systolic Pressure 21.32     Tricuspid Valve Regurgitation TRIVIAL          Assessment/Plan:      * Decompensated hepatic cirrhosis    MELD-Na score: 33 at 11/26/2017  4:01 PM  MELD score: 28 at 11/26/2017  4:01 PM  Calculated from:  Serum Creatinine: 0.9 mg/dL (Rounded to 1) at 11/26/2017  4:01 PM  Serum Sodium: 120 mmol/L (Rounded to 125) at 11/26/2017  4:01 PM  Total Bilirubin: 26.2 mg/dL at 11/26/2017  4:01 PM  INR(ratio): 2.3 at 11/26/2017  4:01 PM  Age: 52 years  - hepatology consulted and patient transferred to [facility]  - will ask for clearance for inpatient liver tx evaluation tomorrow  - had half of her evaluation completed on last admission  - will get addiction psych consult for the AM, PET pending;        Coagulopathy    - vitamin k for 3 days          Alcoholic cirrhosis of liver with ascites    - see decompensated cirrhosis           Cytomegalovirus (CMV) viremia    -- ID evaluated and do not feel it needs to be treated        Thrombocytopenia    -- monitor           Abnormal liver enzymes    -- Stable  -- Etiology liver failure; see that section for more details          Acute on chronic hyponatremia    - on 800 cc fluid restriction; adding albumin today; 120 currently; needs to be 125 for transplant          End stage liver disease              HCC (hepatocellular carcinoma)    AFP 9; seen on CT on 10/19, 1 cm focus, monitor for now          Hepatic encephalopathy    -- Improving, though not yet returned to baseline  -- Continue lactulose and rifaximin          Severe protein-calorie malnutrition    PAB, boost and dietary  consult          Ascites    -- Holding diuretics at present, s/p 2L removed on 11/22 with testing negative for SBP  -- No abdominal pain today              Essential hypertension    - Stable  - Holding home lisinopril in setting of decompensated liver disease          Anemia of chronic disease    - monitor daily            VTE Risk Mitigation         Ordered     Place sequential compression device  Until discontinued      11/24/17 0734     Place FABIOLA hose  Until discontinued      11/23/17 2305     Medium Risk of VTE  Once      11/23/17 2305              Burton Vora MD  Department of Hospital Medicine   [facility]

## 2017-11-27 NOTE — ASSESSMENT & PLAN NOTE
Initially admitted to [facility] with decompensation. Has been attending etoh rehab.   Previously deferred due to lack of caregiver plan but this seems more stable now per our social work.  Will need to complete inpatient evaluation and present to committee (planning to present Wednesday)    Plan:  - continue Prednisone Taper  - obtain addiction psych and social work consults (ordered & pending)  - continue lactulose and rifaximin, titrate to 3-4 BMs/day  - C Diff (-)  - CMP and INR daily  - UCx + w/ >100,000 cfu/ml E Coli sensitive to rocephin, continue rocephin (day 3 today)   - f/u PETH test frm 11/24 (pending)  - 0.8 L free water restrict, (please ensure enforcement of restriction)  - pt w/ results of + CMV PCR load, agree w/ ID recs, no evidence of end organ damage, monitor for now, will consider treatment down the line if transplant is pursued   - Paracentesis today (ordered)  - If transplant is pursued pt still needs EGD, C-Scope, Mammogram to complete workup   - f/u TSH    Addiction psych today, S.W. Has evaluated the pt; will plan on presenting to transplant committee Wednesday

## 2017-11-27 NOTE — ASSESSMENT & PLAN NOTE
-- Improving, though not yet returned to baseline, AAO times 3; put out 1 L in flexi-seal  -- Continue lactulose and rifaximin

## 2017-11-27 NOTE — PLAN OF CARE
Problem: Patient Care Overview  Goal: Plan of Care Review  Outcome: Ongoing (interventions implemented as appropriate)  POC reviewed with pt and family.  Pt verbalized understanding.  Questions and concerns addressed, and no acute events today.  Patient continues to receive IV antibiotics, patient remains oriented to person and place only.  Patient continue to need straight caths q 6 hours for urinary retention.  Pt progressing toward goals, will continue to monitor.  See flowsheets for full assessment and VS info.

## 2017-11-28 ENCOUNTER — TELEPHONE (OUTPATIENT)
Dept: TRANSPLANT | Facility: CLINIC | Age: 52
End: 2017-11-28

## 2017-11-28 LAB
ALBUMIN SERPL BCP-MCNC: 3.1 G/DL
ALBUMIN SERPL BCP-MCNC: 3.5 G/DL
ALP SERPL-CCNC: 222 U/L
ALP SERPL-CCNC: 229 U/L
ALT SERPL W/O P-5'-P-CCNC: 209 U/L
ALT SERPL W/O P-5'-P-CCNC: 217 U/L
ANION GAP SERPL CALC-SCNC: 10 MMOL/L
ANION GAP SERPL CALC-SCNC: 8 MMOL/L
ANISOCYTOSIS BLD QL SMEAR: ABNORMAL
AST SERPL-CCNC: 207 U/L
AST SERPL-CCNC: 214 U/L
BASOPHILS # BLD AUTO: 0.01 K/UL
BASOPHILS NFR BLD: 0.1 %
BILIRUB SERPL-MCNC: 26.4 MG/DL
BILIRUB SERPL-MCNC: 27.2 MG/DL
BUN SERPL-MCNC: 12 MG/DL
BUN SERPL-MCNC: 16 MG/DL
BURR CELLS BLD QL SMEAR: ABNORMAL
CALCIUM SERPL-MCNC: 8.8 MG/DL
CALCIUM SERPL-MCNC: 8.8 MG/DL
CHLORIDE SERPL-SCNC: 91 MMOL/L
CHLORIDE SERPL-SCNC: 91 MMOL/L
CO2 SERPL-SCNC: 23 MMOL/L
CO2 SERPL-SCNC: 24 MMOL/L
CREAT SERPL-MCNC: 0.8 MG/DL
CREAT SERPL-MCNC: 0.8 MG/DL
DIFFERENTIAL METHOD: ABNORMAL
EOSINOPHIL # BLD AUTO: 0 K/UL
EOSINOPHIL NFR BLD: 0.4 %
ERYTHROCYTE [DISTWIDTH] IN BLOOD BY AUTOMATED COUNT: 24.3 %
EST. GFR  (AFRICAN AMERICAN): >60 ML/MIN/1.73 M^2
EST. GFR  (AFRICAN AMERICAN): >60 ML/MIN/1.73 M^2
EST. GFR  (NON AFRICAN AMERICAN): >60 ML/MIN/1.73 M^2
EST. GFR  (NON AFRICAN AMERICAN): >60 ML/MIN/1.73 M^2
GLUCOSE SERPL-MCNC: 115 MG/DL
GLUCOSE SERPL-MCNC: 82 MG/DL
HCT VFR BLD AUTO: 24.6 %
HGB BLD-MCNC: 9.2 G/DL
HYPOCHROMIA BLD QL SMEAR: ABNORMAL
IMM GRANULOCYTES # BLD AUTO: 0.04 K/UL
IMM GRANULOCYTES NFR BLD AUTO: 0.5 %
INR PPP: 2.6
LYMPHOCYTES # BLD AUTO: 1.9 K/UL
LYMPHOCYTES NFR BLD: 25.6 %
MAGNESIUM SERPL-MCNC: 2 MG/DL
MCH RBC QN AUTO: 30.6 PG
MCHC RBC AUTO-ENTMCNC: 37.4 G/DL
MCV RBC AUTO: 82 FL
MONOCYTES # BLD AUTO: 0.6 K/UL
MONOCYTES NFR BLD: 7.5 %
NEUTROPHILS # BLD AUTO: 5 K/UL
NEUTROPHILS NFR BLD: 65.9 %
NRBC BLD-RTO: 0 /100 WBC
OVALOCYTES BLD QL SMEAR: ABNORMAL
PHOSPHATE SERPL-MCNC: 1.6 MG/DL
PLATELET # BLD AUTO: 69 K/UL
PLATELET BLD QL SMEAR: ABNORMAL
PMV BLD AUTO: 10.1 FL
POIKILOCYTOSIS BLD QL SMEAR: ABNORMAL
POLYCHROMASIA BLD QL SMEAR: ABNORMAL
POTASSIUM SERPL-SCNC: 3.4 MMOL/L
POTASSIUM SERPL-SCNC: 3.8 MMOL/L
PROT SERPL-MCNC: 5.2 G/DL
PROT SERPL-MCNC: 5.5 G/DL
PROTHROMBIN TIME: 25.7 SEC
RBC # BLD AUTO: 3.01 M/UL
SODIUM SERPL-SCNC: 123 MMOL/L
SODIUM SERPL-SCNC: 124 MMOL/L
TARGETS BLD QL SMEAR: ABNORMAL
WBC # BLD AUTO: 7.57 K/UL

## 2017-11-28 PROCEDURE — 83735 ASSAY OF MAGNESIUM: CPT

## 2017-11-28 PROCEDURE — 63600175 PHARM REV CODE 636 W HCPCS: Performed by: HOSPITALIST

## 2017-11-28 PROCEDURE — 99232 SBSQ HOSP IP/OBS MODERATE 35: CPT | Mod: ,,, | Performed by: INTERNAL MEDICINE

## 2017-11-28 PROCEDURE — 36415 COLL VENOUS BLD VENIPUNCTURE: CPT

## 2017-11-28 PROCEDURE — 11000001 HC ACUTE MED/SURG PRIVATE ROOM

## 2017-11-28 PROCEDURE — 25000003 PHARM REV CODE 250: Performed by: HOSPITALIST

## 2017-11-28 PROCEDURE — 63600175 PHARM REV CODE 636 W HCPCS: Performed by: INTERNAL MEDICINE

## 2017-11-28 PROCEDURE — 85610 PROTHROMBIN TIME: CPT

## 2017-11-28 PROCEDURE — P9047 ALBUMIN (HUMAN), 25%, 50ML: HCPCS | Performed by: HOSPITALIST

## 2017-11-28 PROCEDURE — 25000003 PHARM REV CODE 250: Performed by: STUDENT IN AN ORGANIZED HEALTH CARE EDUCATION/TRAINING PROGRAM

## 2017-11-28 PROCEDURE — 80053 COMPREHEN METABOLIC PANEL: CPT

## 2017-11-28 PROCEDURE — 84100 ASSAY OF PHOSPHORUS: CPT

## 2017-11-28 PROCEDURE — 85025 COMPLETE CBC W/AUTO DIFF WBC: CPT

## 2017-11-28 PROCEDURE — 99233 SBSQ HOSP IP/OBS HIGH 50: CPT | Mod: ,,, | Performed by: HOSPITALIST

## 2017-11-28 RX ORDER — SODIUM,POTASSIUM PHOSPHATES 280-250MG
2 POWDER IN PACKET (EA) ORAL
Status: COMPLETED | OUTPATIENT
Start: 2017-11-28 | End: 2017-11-29

## 2017-11-28 RX ORDER — FUROSEMIDE 40 MG/1
40 TABLET ORAL DAILY PRN
Status: ON HOLD | COMMUNITY
End: 2018-01-04 | Stop reason: ALTCHOICE

## 2017-11-28 RX ORDER — POTASSIUM CHLORIDE 20 MEQ/1
40 TABLET, EXTENDED RELEASE ORAL ONCE
Status: COMPLETED | OUTPATIENT
Start: 2017-11-28 | End: 2017-11-28

## 2017-11-28 RX ORDER — PREDNISONE 20 MG/1
40 TABLET ORAL DAILY
Status: ON HOLD | COMMUNITY
End: 2017-12-27 | Stop reason: DRUGHIGH

## 2017-11-28 RX ADMIN — LACTULOSE 20 G: 20 SOLUTION ORAL at 05:11

## 2017-11-28 RX ADMIN — POTASSIUM & SODIUM PHOSPHATES POWDER PACK 280-160-250 MG 2 PACKET: 280-160-250 PACK at 10:11

## 2017-11-28 RX ADMIN — POTASSIUM CHLORIDE 40 MEQ: 1500 TABLET, EXTENDED RELEASE ORAL at 09:11

## 2017-11-28 RX ADMIN — LACTULOSE 20 G: 20 SOLUTION ORAL at 09:11

## 2017-11-28 RX ADMIN — ALBUMIN (HUMAN) 25 G: 12.5 SOLUTION INTRAVENOUS at 09:11

## 2017-11-28 RX ADMIN — PREDNISONE 15 MG: 5 TABLET ORAL at 09:11

## 2017-11-28 RX ADMIN — POTASSIUM & SODIUM PHOSPHATES POWDER PACK 280-160-250 MG 2 PACKET: 280-160-250 PACK at 05:11

## 2017-11-28 RX ADMIN — RIFAXIMIN 550 MG: 550 TABLET ORAL at 09:11

## 2017-11-28 RX ADMIN — LACTULOSE 20 G: 20 SOLUTION ORAL at 02:11

## 2017-11-28 RX ADMIN — ALBUMIN (HUMAN) 25 G: 12.5 SOLUTION INTRAVENOUS at 05:11

## 2017-11-28 RX ADMIN — ALBUMIN (HUMAN) 25 G: 12.5 SOLUTION INTRAVENOUS at 02:11

## 2017-11-28 RX ADMIN — PHYTONADIONE 10 MG: 10 INJECTION, EMULSION INTRAMUSCULAR; INTRAVENOUS; SUBCUTANEOUS at 10:11

## 2017-11-28 RX ADMIN — PANTOPRAZOLE SODIUM 40 MG: 40 TABLET, DELAYED RELEASE ORAL at 09:11

## 2017-11-28 RX ADMIN — POTASSIUM & SODIUM PHOSPHATES POWDER PACK 280-160-250 MG 2 PACKET: 280-160-250 PACK at 09:11

## 2017-11-28 NOTE — PLAN OF CARE
11/28/17 1212   Discharge Reassessment   Assessment Type Discharge Planning Reassessment   Provided patient/caregiver education on the expected discharge date and the discharge plan Yes   Do you have any problems affording any of your prescribed medications? No   Discharge Plan A Rehab   Discharge Plan B Home with family;Home Health   Can the patient answer the patient profile reliably? (mentation waxes and wanes)   How does the patient rate their overall health at the present time? Fair   Describe the patient's ability to walk at the present time. Walks with the help of equipment   How often would a person be available to care for the patient? Often   Number of comorbid conditions (as recorded on the chart) Five or more   During the past month, has the patient often been bothered by feeling down, depressed or hopeless? No   During the past month, has the patient often been bothered by little interest or pleasure in doing things? Yes

## 2017-11-28 NOTE — PROGRESS NOTES
[facility]  Hepatology  Progress Note    Patient Name: Marcie Bazan  MRN: [id number]  Admission Date: 11/23/2017  Hospital Length of Stay: 5 days  Attending Provider: Surinder Madrigal MD   Primary Care Physician: ODILIA Wall  Principal Problem:Decompensated hepatic cirrhosis    Subjective:     Transplant status: Pre-transplant    HPI: 52 year old female with PMHx of ESLD 2/2 EtOH hepatitis and essential HTN who presents to [facility] as a direct transfer from [facility] for evaluation of decompensated liver cirrhosis. Patient presented to [facility] with generalized weakness and was admitted to  11/10 for hyponatremia seen on labs in GI office. Discharged on 11/15 and returned on 11/20 with 1 week worsening weakness, malaise, lethargy, and decreased appetite. Denied fever/chills, abdominal pain, bright red or black tarry stools, hematemesis or other abnormal bleeding, confusion or disorientation, changes in urination, light-headedness, headache, changes in vision, dyspnea, chest pain or palpitations. Labs demonstrated Na 116 and MELD 32; patient admitted for hyponatremia. Nephrology consulted, recommended fluid restriction , holding diuretics, and prednisode taper that started 11/23 (decreased from 40mg QD to 20mg Qd.) Patient transferred to [facility] for revaluation of liver disease. Of note, paracentesis performed 11/22, removed 2L, negative for SBP.     Follows with Dr. Souza for cirrhosis. Since early 09/2017, patient's liver failure progressing indicated by worsening clinical signs (abdominal distention, weakness/fatigue) and MELD. She had been evaluated by [facility] liver transplant committee 10/25/2017 and declined transplant candidacy 2/2 continued EtOH use and lack of solid caregiver support/plan.She has since been enrolled in in-patient substance abuse program at [facility] where she has remained since early November; per patient, her last EtOH beverage was  "11/01/2017.  Per chart review on 11/22/17:     "Patient's case was discussed in liver selection committee today. Per committee discussion, the SW will call patient's daughter to discuss caregiver plan at this time. If the caregiver plan is adequate, patient will need to follow up with either/both addiction psych or SW regarding substance abuse and plan. May need to do this inpatient if patient is still admitted."        Denies history of GI bleed, SBP, renal disease, or other complications of cirrhosis. Liver biopsy 10/24 demonstrated stage 4 cirrhosis. Records reports EGD in August 2016 that showed small esophageal varices with severe gastritis. Colonoscopy 2015 with 2 polyps and internal hemorrhoids Started on prednisone 40mg QD on 10/25; was on spironolactone, has been held since 11/10 for hypoNa; on lactulose.    Interval History:   Pt more awake today oriented to person, place, situation    Current Facility-Administered Medications   Medication    albumin human 25% bottle 25 g    lactulose 20 gram/30 mL solution Soln 20 g    ondansetron disintegrating tablet 4 mg    pantoprazole EC tablet 40 mg    potassium, sodium phosphates 280-160-250 mg packet 2 packet    predniSONE tablet 15 mg    rifAXIMin tablet 550 mg    sodium chloride 0.9% flush 3 mL       Objective:     Vital Signs (Most Recent):  Temp: 98.7 °F (37.1 °C) (11/28/17 1555)  Pulse: 92 (11/28/17 1555)  Resp: 20 (11/28/17 1555)  BP: 121/61 (11/28/17 1555)  SpO2: 98 % (11/28/17 1555) Vital Signs (24h Range):  Temp:  [97.3 °F (36.3 °C)-98.7 °F (37.1 °C)] 98.7 °F (37.1 °C)  Pulse:  [81-92] 92  Resp:  [16-20] 20  SpO2:  [96 %-99 %] 98 %  BP: (110-121)/(55-64) 121/61     Weight: 69.6 kg (153 lb 7 oz) (11/23/17 2147)  Body mass index is 25.53 kg/m².    Physical Exam   Constitutional:   Confused ; appears very frail   HENT:   Head: Normocephalic.   Eyes: Scleral icterus is present.   Cardiovascular: Normal rate.    Pulmonary/Chest: Effort normal. "   Abdominal: Soft.   + fluid wave ; nontender   Neurological:    very slow to respond and difficult to arouse but oriented to person, place, time    Vitals reviewed.      MELD-Na score: 34 at 11/28/2017  3:40 AM  MELD score: 30 at 11/28/2017  3:40 AM  Calculated from:  Serum Creatinine: 0.8 mg/dL (Rounded to 1) at 11/28/2017  3:40 AM  Serum Sodium: 124 mmol/L (Rounded to 125) at 11/28/2017  3:40 AM  Total Bilirubin: 26.4 mg/dL at 11/28/2017  3:40 AM  INR(ratio): 2.6 at 11/28/2017  3:40 AM  Age: 52 years    Significant Labs:  CBC:     Recent Labs  Lab 11/28/17  0340   WBC 7.57   RBC 3.01*   HGB 9.2*   HCT 24.6*   PLT 69*     CMP:     Recent Labs  Lab 11/28/17  0340   GLU 82   CALCIUM 8.8   ALBUMIN 3.1*   PROT 5.2*   *   K 3.4*   CO2 23   CL 91*   BUN 16   CREATININE 0.8   ALKPHOS 229*   *   *   BILITOT 26.4*     Coagulation:   Recent Labs  Lab 11/23/17  2335  11/28/17  0340   INR 2.2*  < > 2.6*   APTT 36.1*  --   --    < > = values in this interval not displayed.    Significant Imaging:  Labs: Reviewed    Review of Systems      Assessment/Plan:     * Decompensated hepatic cirrhosis    Initially admitted to [facility] with decompensation. Has been attending etoh rehab.   Previously deferred due to lack of caregiver plan but this seems more stable now per our social work.  Will need to complete inpatient evaluation and present to committee (planning to present Wednesday)      MELD-Na score: 34 at 11/28/2017  3:40 AM  MELD score: 30 at 11/28/2017  3:40 AM  Calculated from:  Serum Creatinine: 0.8 mg/dL (Rounded to 1) at 11/28/2017  3:40 AM  Serum Sodium: 124 mmol/L (Rounded to 125) at 11/28/2017  3:40 AM  Total Bilirubin: 26.4 mg/dL at 11/28/2017  3:40 AM  INR(ratio): 2.6 at 11/28/2017  3:40 AM  Age: 52 years      Plan:  - continue Prednisone Taper  - Pt seen by addiction psych ruled moderate risk w/ available support system   - evaluated by social work  - continue lactulose and rifaximin, titrate to 3-4  BMs/day   - Recommend removal of Fecal Management System once pt is more awake able to ambulate to bedside commode  - C Diff (-)  - CMP and INR daily  - UCx + w/ >100,000 cfu/ml E Coli sensitive to rocephin, completed day 3 rocephin 11/27  - f/u PETH test fr 11/24 (pending)  - 0.8 L free water restrict, (please ensure enforcement of restriction)  - pt w/ results of + CMV PCR load, agree w/ ID recs, no evidence of end organ damage, monitor for now, will consider treatment down the line if transplant is pursued   - proceed w/ EGD, C-Scope, Mammogram to complete workup     Based on the abovementioned details will proceed w/ Liver TxP workup and plan on presenting to transplant committee Wednesday                 Hyponatremia    Improving w/ free water restriction             Thank you for your consult. I will follow-up with patient. Please contact us if you have any additional questions.    Magan Lazcano MD  Hepatology  [facility]

## 2017-11-28 NOTE — PLAN OF CARE
Problem: Liver Failure, Acute/Chronic (Adult)  Intervention: Optimize/Manage Nutrition      Recommendations     Recommendation/Intervention: 1. Continue w/2g diet. 2.Encourage PO intake >/= 75% EEN/EPN 3. If PO intake is <50% provide Boost 1-2 daily 4. RD to follow  Goals: pt to consume nutrients >/= 50% EEN/EPN by next visit  Nutrition Goal Status: new  Communication of RD Recs: reviewed with RN    Assessment and Plan         Severe protein-calorie malnutrition     Related to (etiology):   Altered nutrient utiliztion 2/2 EtOH abuse     Signs and Symptoms (as evidenced by):   EtOH Cirrhosis, Hepatitis Elevated labs(BUN, Alk Phos, T.hayley. AST, ALT)     Interventions/Recommendations (treatment strategy):  See recs above     Nutrition Diagnosis Status:   New

## 2017-11-28 NOTE — ASSESSMENT & PLAN NOTE
Initially admitted to [facility] with decompensation. Has been attending etoh rehab.   Previously deferred due to lack of caregiver plan but this seems more stable now per our social work.  Will need to complete inpatient evaluation and present to committee (planning to present Wednesday)      MELD-Na score: 34 at 11/28/2017  3:40 AM  MELD score: 30 at 11/28/2017  3:40 AM  Calculated from:  Serum Creatinine: 0.8 mg/dL (Rounded to 1) at 11/28/2017  3:40 AM  Serum Sodium: 124 mmol/L (Rounded to 125) at 11/28/2017  3:40 AM  Total Bilirubin: 26.4 mg/dL at 11/28/2017  3:40 AM  INR(ratio): 2.6 at 11/28/2017  3:40 AM  Age: 52 years      Plan:  - continue Prednisone Taper  - Pt seen by addiction psych ruled moderate risk w/ available support system   - evaluated by social work  - continue lactulose and rifaximin, titrate to 3-4 BMs/day   - Recommend removal of Fecal Management System once pt is more awake able to ambulate to bedside commode  - C Diff (-)  - CMP and INR daily  - UCx + w/ >100,000 cfu/ml E Coli sensitive to rocephin, completed day 3 rocephin 11/27  - f/u PETH test frm 11/24 (pending)  - 0.8 L free water restrict, (please ensure enforcement of restriction)  - pt w/ results of + CMV PCR load, agree w/ ID recs, no evidence of end organ damage, monitor for now, will consider treatment down the line if transplant is pursued   - proceed w/ EGD, C-Scope, Mammogram to complete workup     Based on the abovementioned details will proceed w/ Liver TxP workup and plan on presenting to transplant committee Wednesday

## 2017-11-28 NOTE — ASSESSMENT & PLAN NOTE
Related to (etiology):   Altered nutrient utiliztion 2/2 EtOH abuse    Signs and Symptoms (as evidenced by):   EtOH Cirrhosis, Hepatitis Elevated labs(BUN, Alk Phos, T.hayley. AST, ALT)    Interventions/Recommendations (treatment strategy):  See recs above    Nutrition Diagnosis Status:   New

## 2017-11-28 NOTE — TELEPHONE ENCOUNTER
SW team received records from [facility] regarding patient's treatment with them.  KARLA notified addiction psychiatrist and hepatology team that we have these records.  KARLA remains available for all transplant resources, education and psychosocial support.

## 2017-11-28 NOTE — CONSULTS
[facility]  Adult Nutrition  Consult Note    SUMMARY     Recommendations    Recommendation/Intervention: 1. Continue w/2g diet. 2.Encourage PO intake >/= 75% EEN/EPN 3. If PO intake is <50% provide Boost 1-2 daily 4. RD to follow  Goals: pt to consume nutrients >/= 50% EEN/EPN by next visit  Nutrition Goal Status: new  Communication of RD Recs: reviewed with RN    Reason for Assessment    Reason for Assessment: physician consult  Diagnosis:  (Decompensated hepatic cirrhosis )  Relevent Medical History: EtOH Hepatits, Cirrhosis, ESRD, Tx candidate,    Interdisciplinary Rounds: attended  General Information Comments: Pt states decreased appetite, unsure of wt. loss due to acities. Note Pt previously reported 20lb wt loss in 3mo.  pt i sconsuming boost 1-2/day. Attempted edu fot liver w/u. pt informed RD that her Daughter would be handling the cooking and asked to leave Edu materials at bedside. Daughter will be here this afternoon or tomorrow as she just delivered a child in [facility]. Upon Physical exam pt appears mildly malnurished most likely from EtOH abuse and decreased  po intake  Nutrition Discharge Planning: Cardiac diet w/ po intake > 75% EEN/EPN    Nutrition Prescription Ordered    Current Diet Order: 2g 800ml fl res   Oral Nutrition Supplement: Boost 1-2 daily     Evaluation of Received Nutrients/Fluid Intake  Energy Calories Required: not meeting needs   Protein Required: not meeting needs   I/O: -5.4 L since admit        Intake/Output Summary (Last 24 hours) at 11/28/17 1149  Last data filed at 11/28/17 0559   Gross per 24 hour   Intake              380 ml   Output             1400 ml   Net            -1020 ml   Fluid Required: not meeting needs  Comments: LBM:11/26/17     % Intake of Estimated Energy Needs: 25 - 50 %  % Meal Intake: 50%     Nutrition Risk Screen     Nutrition Risk Screen: dysphagia or difficulty swallowing    Nutrition/Diet History    Patient Reported  "Diet/Restrictions/Preferences: low salt  Typical Food/Fluid Intake: decreased appetite, but never was a big eater  Food Preferences: No cultural or Jehovah's witness preferences noted   Factors Affecting Nutritional Intake: abdominal distention, chewing difficulties/inability to chew food, decreased appetite, difficulty/impaired swallowing, early satiety        Labs/Tests/Procedures/Meds    Pertinent Labs Reviewed: reviewed  Pertinent Labs Comments: Na-122, K-3.1, Cl-89, BUN-23, Ca-85, Alb-2.3, T.hayley-25.7 AlkPhos-286, AST-268, ALT-316  Pertinent Medications Reviewed: reviewed  Pertinent Medications Comments: Albumin, Pantoprazole, Prednisone    Physical Findings    Overall Physical Appearance: weak, lethargic, hypertonia, loss of subcutaneous fat, loss of muscle mass, generalized wasting  Oral/Mouth Cavity: all teeth present (age appropriate)  Skin: intact    Anthropometrics    Temp: 97.3 °F (36.3 °C)  Height: 5' 5" (165.1 cm)  Weight Method: Bed Scale  Weight: 69.6 kg (153 lb 7 oz)  Ideal Body Weight (IBW), Female: 125 lb  % Ideal Body Weight, Female (lb): 122.75 lb  BMI (Calculated): 25.6  BMI Grade: 25 - 29.9 - overweight (pt  has a lot of abdominal acities)  Usual Body Weight (UBW), k kg  Weight Change Amount: 22 lb 0.7 oz     Estimated/Assessed Needs    Weight Used For Calorie Calculations: 69.6 kg (153 lb 7 oz)   Height (cm): 165.1 cm  Energy Calorie Requirements (kcal): 1705  Energy Need Method: [place]-St Jeor (X 1.25)   RMR ([place]-St. Jeor Equation): 1296.88   Weight Used For Protein Calculations: 69.6 kg (153 lb 7 oz)  Protein Requirements: 70-84g/d  Fluid Requirements (mL): per MD  Fluid Need Method: RDA Method   RDA Method (mL): 1705         Assessment and Plan    Severe protein-calorie malnutrition    Related to (etiology):   Altered nutrient utiliztion 2/2 EtOH abuse    Signs and Symptoms (as evidenced by):   EtOH Cirrhosis, Hepatitis Elevated labs(BUN, Alk Phos, T.hayley. AST, " ALT)    Interventions/Recommendations (treatment strategy):  See recs above    Nutrition Diagnosis Status:   New              Monitor and Evaluation    Food and Nutrient Intake: energy intake, food and beverage intake, enteral nutrition intake  Food and Nutrient Adminstration: diet order, enteral and parenteral nutrition administration   Anthropometric Measurements: weight, weight change  Biochemical Data, Medical Tests and Procedures:  (All Labs)  Nutrition-Focused Physical Findings: overall appearance    Nutrition Risk    Level of Risk:  (f/u 1x wk)    Nutrition Follow-Up     RD follow up:YES

## 2017-11-29 ENCOUNTER — TELEPHONE (OUTPATIENT)
Dept: TRANSPLANT | Facility: HOSPITAL | Age: 52
End: 2017-11-29

## 2017-11-29 LAB
ALBUMIN SERPL BCP-MCNC: 3.6 G/DL
ALBUMIN SERPL BCP-MCNC: 3.8 G/DL
ALP SERPL-CCNC: 197 U/L
ALP SERPL-CCNC: 205 U/L
ALT SERPL W/O P-5'-P-CCNC: 170 U/L
ALT SERPL W/O P-5'-P-CCNC: 175 U/L
ANION GAP SERPL CALC-SCNC: 10 MMOL/L
ANION GAP SERPL CALC-SCNC: 10 MMOL/L
ANISOCYTOSIS BLD QL SMEAR: SLIGHT
AST SERPL-CCNC: 177 U/L
AST SERPL-CCNC: 180 U/L
BACTERIA BLD CULT: NORMAL
BACTERIA BLD CULT: NORMAL
BASOPHILS # BLD AUTO: 0.01 K/UL
BASOPHILS NFR BLD: 0.1 %
BILIRUB SERPL-MCNC: 25.1 MG/DL
BILIRUB SERPL-MCNC: 26 MG/DL
BUN SERPL-MCNC: 10 MG/DL
BUN SERPL-MCNC: 10 MG/DL
BURR CELLS BLD QL SMEAR: ABNORMAL
CALCIUM SERPL-MCNC: 9.1 MG/DL
CALCIUM SERPL-MCNC: 9.2 MG/DL
CHLORIDE SERPL-SCNC: 91 MMOL/L
CHLORIDE SERPL-SCNC: 92 MMOL/L
CO2 SERPL-SCNC: 25 MMOL/L
CO2 SERPL-SCNC: 25 MMOL/L
CREAT SERPL-MCNC: 0.8 MG/DL
CREAT SERPL-MCNC: 0.8 MG/DL
DIFFERENTIAL METHOD: ABNORMAL
EOSINOPHIL # BLD AUTO: 0 K/UL
EOSINOPHIL NFR BLD: 0.5 %
ERYTHROCYTE [DISTWIDTH] IN BLOOD BY AUTOMATED COUNT: 24.4 %
EST. GFR  (AFRICAN AMERICAN): >60 ML/MIN/1.73 M^2
EST. GFR  (AFRICAN AMERICAN): >60 ML/MIN/1.73 M^2
EST. GFR  (NON AFRICAN AMERICAN): >60 ML/MIN/1.73 M^2
EST. GFR  (NON AFRICAN AMERICAN): >60 ML/MIN/1.73 M^2
GLUCOSE SERPL-MCNC: 117 MG/DL
GLUCOSE SERPL-MCNC: 89 MG/DL
HCT VFR BLD AUTO: 24.4 %
HGB BLD-MCNC: 8.9 G/DL
HYPOCHROMIA BLD QL SMEAR: ABNORMAL
IMM GRANULOCYTES # BLD AUTO: 0.05 K/UL
IMM GRANULOCYTES NFR BLD AUTO: 0.7 %
INR PPP: 2.8
LYMPHOCYTES # BLD AUTO: 1.8 K/UL
LYMPHOCYTES NFR BLD: 25 %
MAGNESIUM SERPL-MCNC: 2.1 MG/DL
MCH RBC QN AUTO: 29.8 PG
MCHC RBC AUTO-ENTMCNC: 36.5 G/DL
MCV RBC AUTO: 82 FL
MONOCYTES # BLD AUTO: 0.7 K/UL
MONOCYTES NFR BLD: 10 %
NEUTROPHILS # BLD AUTO: 4.6 K/UL
NEUTROPHILS NFR BLD: 63.7 %
NRBC BLD-RTO: 0 /100 WBC
OVALOCYTES BLD QL SMEAR: ABNORMAL
PHOSPHATE SERPL-MCNC: 2.4 MG/DL
PLATELET # BLD AUTO: 65 K/UL
PMV BLD AUTO: ABNORMAL FL
POIKILOCYTOSIS BLD QL SMEAR: SLIGHT
POLYCHROMASIA BLD QL SMEAR: ABNORMAL
POTASSIUM SERPL-SCNC: 3.5 MMOL/L
POTASSIUM SERPL-SCNC: 3.8 MMOL/L
PROT SERPL-MCNC: 5.4 G/DL
PROT SERPL-MCNC: 5.7 G/DL
PROTHROMBIN TIME: 28 SEC
RBC # BLD AUTO: 2.99 M/UL
SODIUM SERPL-SCNC: 126 MMOL/L
SODIUM SERPL-SCNC: 127 MMOL/L
SPHEROCYTES BLD QL SMEAR: ABNORMAL
TARGETS BLD QL SMEAR: ABNORMAL
WBC # BLD AUTO: 7.29 K/UL

## 2017-11-29 PROCEDURE — 85610 PROTHROMBIN TIME: CPT

## 2017-11-29 PROCEDURE — 99233 SBSQ HOSP IP/OBS HIGH 50: CPT | Mod: ,,, | Performed by: HOSPITALIST

## 2017-11-29 PROCEDURE — 85025 COMPLETE CBC W/AUTO DIFF WBC: CPT

## 2017-11-29 PROCEDURE — 63600175 PHARM REV CODE 636 W HCPCS: Performed by: HOSPITALIST

## 2017-11-29 PROCEDURE — 25000003 PHARM REV CODE 250: Performed by: STUDENT IN AN ORGANIZED HEALTH CARE EDUCATION/TRAINING PROGRAM

## 2017-11-29 PROCEDURE — 97535 SELF CARE MNGMENT TRAINING: CPT

## 2017-11-29 PROCEDURE — 63600175 PHARM REV CODE 636 W HCPCS: Performed by: INTERNAL MEDICINE

## 2017-11-29 PROCEDURE — 25000003 PHARM REV CODE 250: Performed by: HOSPITALIST

## 2017-11-29 PROCEDURE — P9047 ALBUMIN (HUMAN), 25%, 50ML: HCPCS | Performed by: HOSPITALIST

## 2017-11-29 PROCEDURE — 84100 ASSAY OF PHOSPHORUS: CPT

## 2017-11-29 PROCEDURE — 80053 COMPREHEN METABOLIC PANEL: CPT

## 2017-11-29 PROCEDURE — 11000001 HC ACUTE MED/SURG PRIVATE ROOM

## 2017-11-29 PROCEDURE — 36415 COLL VENOUS BLD VENIPUNCTURE: CPT

## 2017-11-29 PROCEDURE — 83735 ASSAY OF MAGNESIUM: CPT

## 2017-11-29 RX ORDER — PREDNISONE 5 MG/1
5 TABLET ORAL DAILY
Status: COMPLETED | OUTPATIENT
Start: 2017-12-09 | End: 2017-12-15

## 2017-11-29 RX ORDER — PREDNISONE 10 MG/1
10 TABLET ORAL DAILY
Status: COMPLETED | OUTPATIENT
Start: 2017-12-02 | End: 2017-12-08

## 2017-11-29 RX ADMIN — RIFAXIMIN 550 MG: 550 TABLET ORAL at 07:11

## 2017-11-29 RX ADMIN — POTASSIUM & SODIUM PHOSPHATES POWDER PACK 280-160-250 MG 2 PACKET: 280-160-250 PACK at 05:11

## 2017-11-29 RX ADMIN — PANTOPRAZOLE SODIUM 40 MG: 40 TABLET, DELAYED RELEASE ORAL at 08:11

## 2017-11-29 RX ADMIN — PREDNISONE 15 MG: 5 TABLET ORAL at 08:11

## 2017-11-29 RX ADMIN — ALBUMIN (HUMAN) 25 G: 12.5 SOLUTION INTRAVENOUS at 07:11

## 2017-11-29 RX ADMIN — LACTULOSE 20 G: 20 SOLUTION ORAL at 03:11

## 2017-11-29 RX ADMIN — LACTULOSE 20 G: 20 SOLUTION ORAL at 05:11

## 2017-11-29 RX ADMIN — LACTULOSE 20 G: 20 SOLUTION ORAL at 07:11

## 2017-11-29 RX ADMIN — ALBUMIN (HUMAN) 25 G: 12.5 SOLUTION INTRAVENOUS at 03:11

## 2017-11-29 RX ADMIN — RIFAXIMIN 550 MG: 550 TABLET ORAL at 08:11

## 2017-11-29 RX ADMIN — ALBUMIN (HUMAN) 25 G: 12.5 SOLUTION INTRAVENOUS at 05:11

## 2017-11-29 NOTE — PT/OT/SLP PROGRESS
"Occupational Therapy  Treatment    Marcie Bazan   MRN: [id number]   Admitting Diagnosis: Decompensated hepatic cirrhosis    OT Date of Treatment: 11/29/17   OT Start Time: 1146  OT Stop Time: 1229  OT Total Time (min): 43 min    Billable Minutes:  Self Care/Home Management 43 minutes    General Precautions: Standard, fall    Do you have any cultural, spiritual, Faith conflicts, given your current situation?: none stated    Subjective:  Communicated with RN prior to session.  "I just can't believe I got like this."  Pain/Comfort  Pain Rating 1: 0/10  Pain Rating Post-Intervention 1: 0/10    Objective:  Patient found with: bowel management system, pt found supine in bed and agreeable to therapy.     Functional Mobility:  Bed Mobility:  Rolling/Turning Right: Contact guard assistance, With side rail  Supine to Sit: Minimum Assistance  Sit to Supine: Minimum Assistance    Transfers:  Sit <> Stand Assistance: Contact Guard Assistance (Min A 3 R side steps)  Sit <> Stand Assistive Device: No Assistive Device    Functional Ambulation: Min A R side steps    Activities of Daily Living:    Grooming Position: Seated, EOB  Grooming Level of Assistance: Supervision (Setup A)  Bathing Where Assessed: Edge of bed  Bathing Level of Assistance: Supervision (only bathed lower body, but no LOB noted)    Balance:   Static Sit: GOOD+: Takes MAXIMAL challenges from all directions.    Dynamic Sit: GOOD: Maintains balance through MODERATE excursions of active trunk movement  Static Stand: POOR+: Needs MINIMAL assist to maintain  Dynamic stand: POOR: N/A    Therapeutic Activities and Exercises:  Pt ed re OT role and POC. Pt performed supine to sit and scoot to EOB with Min A. Pt brushed her teeth, washed her face, and bathed her lower body with Setup Supervision. Pt performed sit to stand t/f with CGA and required Min A to step to the R. Pt sat EOB, then returned to supine with Min A.    AM-PAC 6 CLICK ADL   How much help from another " "person does this patient currently need?   1 = Unable, Total/Dependent Assistance  2 = A lot, Maximum/Moderate Assistance  3 = A little, Minimum/Contact Guard/Supervision  4 = None, Modified [place]/Independent    Putting on and taking off regular lower body clothing? : 2  Bathing (including washing, rinsing, drying)?: 3  Toileting, which includes using toilet, bedpan, or urinal? : 2  Putting on and taking off regular upper body clothing?: 3  Taking care of personal grooming such as brushing teeth?: 3  Eating meals?: 4  Total Score: 17     AM-PAC Raw Score CMS "G-Code Modifier Level of Impairment Assistance   6 % Total / Unable   7 - 8 CM 80 - 100% Maximal Assist   9-13 CL 60 - 80% Moderate Assist   14 - 19 CK 40 - 60% Moderate Assist   20 - 22 CJ 20 - 40% Minimal Assist   23 CI 1-20% SBA / CGA   24 CH 0% Independent/ Mod I       Patient left HOB elevated with all lines intact, call button in reach and RN notified    ASSESSMENT:  Marcie Bazan is a 52 y.o. female with a medical diagnosis of Decompensated hepatic cirrhosis and presents with the impairments listed below. Pt is confused and only following 1-step commands consistently and 2-3 step commands ~50%. Pt sits unsupported well, but still requiring Supervision for safety. Pt would benefit from cont OT to improve transfer skills and self care skills.    Rehab identified problem list/impairments: Rehab identified problem list/impairments: weakness, impaired endurance, impaired self care skills, impaired functional mobilty, impaired cognition, gait instability, impaired balance    Rehab potential is good.    Activity tolerance: Fair    Discharge recommendations: Discharge Facility/Level Of Care Needs: nursing facility, skilled     Barriers to discharge: Barriers to Discharge: Decreased caregiver support    Equipment recommendations: bedside commode, walker, rolling, shower chair     GOALS:    Occupational Therapy Goals        Problem: Occupational " Therapy Goal    Goal Priority Disciplines Outcome Interventions   Occupational Therapy Goal     OT, PT/OT Ongoing (interventions implemented as appropriate)    Description:  Goals to be met by: 12/10/17     Patient will increase functional independence with ADLs by performing:    UE Dressing with Set-up Assistance and Supervision.  LE Dressing with Minimal Assistance.  Grooming while standing at sink with Contact Guard Assistance.  Toileting from toilet with Stand-by Assistance for hygiene and clothing management.   Supine to sit with Supervision.  Stand pivot transfers with Contact Guard Assistance.  Bathing while EOB with Setup Assistance and Supervision.  Toilet transfer to toilet with Contact Guard Assistance.                       Plan:  Patient to be seen 3 x/week to address the above listed problems via self-care/home management, therapeutic activities, therapeutic exercises, cognitive retraining  Plan of Care expires: 12/25/17  Plan of Care reviewed with: patient    JA Potts  11/29/2017  Pager: [phone]

## 2017-11-29 NOTE — SUBJECTIVE & OBJECTIVE
Interval History: Patient considered moderate risk for relapse of alcohol. Will initiate liver transplant evaluation.     Review of Systems   Constitutional: Negative for chills and fever.   HENT: Negative for rhinorrhea and sore throat.    Eyes: Negative for pain and discharge.   Respiratory: Negative for cough and shortness of breath.    Cardiovascular: Negative for chest pain and leg swelling.   Gastrointestinal: Positive for abdominal distention and diarrhea. Negative for abdominal pain, blood in stool, nausea and vomiting.   Endocrine: Negative for cold intolerance and heat intolerance.   Genitourinary: Negative for dysuria and flank pain.   Neurological: Negative for dizziness and numbness.   Psychiatric/Behavioral: Positive for confusion. Negative for behavioral problems.     Objective:     Vital Signs (Most Recent):  Temp: 98.7 °F (37.1 °C) (11/28/17 1555)  Pulse: 92 (11/28/17 1555)  Resp: 20 (11/28/17 1555)  BP: 121/61 (11/28/17 1555)  SpO2: 98 % (11/28/17 1555) Vital Signs (24h Range):  Temp:  [97.3 °F (36.3 °C)-98.7 °F (37.1 °C)] 98.7 °F (37.1 °C)  Pulse:  [81-92] 92  Resp:  [16-20] 20  SpO2:  [96 %-99 %] 98 %  BP: (110-121)/(55-64) 121/61     Weight: 69.6 kg (153 lb 7 oz)  Body mass index is 25.53 kg/m².    Intake/Output Summary (Last 24 hours) at 11/28/17 1805  Last data filed at 11/28/17 0559   Gross per 24 hour   Intake              260 ml   Output              750 ml   Net             -490 ml      Physical Exam   Constitutional: She appears well-developed and well-nourished.   HENT:   Head: Normocephalic and atraumatic.   Eyes: Conjunctivae are normal. Pupils are equal, round, and reactive to light. Scleral icterus is present.   Neck: Normal range of motion. No thyromegaly present.   Cardiovascular: Normal rate, regular rhythm and normal heart sounds.    Pulmonary/Chest: Effort normal and breath sounds normal.   Abdominal: Soft. She exhibits distension. There is no tenderness.   Musculoskeletal:  Normal range of motion. She exhibits no edema.   Neurological: She is alert.   AAO times 2   Skin: No erythema. No pallor.       MELD-Na score: 34 at 11/28/2017  3:20 PM  MELD score: 30 at 11/28/2017  3:20 PM  Calculated from:  Serum Creatinine: 0.8 mg/dL (Rounded to 1) at 11/28/2017  3:20 PM  Serum Sodium: 123 mmol/L (Rounded to 125) at 11/28/2017  3:20 PM  Total Bilirubin: 27.2 mg/dL at 11/28/2017  3:20 PM  INR(ratio): 2.6 at 11/28/2017  3:40 AM  Age: 52 years    Significant Labs:  CBC:    Recent Labs  Lab 11/27/17  0401 11/28/17  0340   WBC 10.49 7.57   HGB 10.4* 9.2*   HCT 27.8* 24.6*   PLT 88* 69*     CMP:    Recent Labs  Lab 11/27/17  1702 11/28/17  0340 11/28/17  1520   * 124* 123*   K 3.4* 3.4* 3.8   CL 90* 91* 91*   CO2 23 23 24   * 82 115*   BUN 19 16 12   CREATININE 0.8 0.8 0.8   CALCIUM 9.0 8.8 8.8   PROT 5.5* 5.2* 5.5*   ALBUMIN 3.1* 3.1* 3.5   BILITOT 27.6* 26.4* 27.2*   ALKPHOS 249* 229* 222*   * 207* 214*   * 217* 209*   ANIONGAP 10 10 8   EGFRNONAA >60.0 >60.0 >60.0     PTINR:    Recent Labs  Lab 11/27/17  0401 11/28/17  0340   INR 2.4* 2.6*       Significant Procedures:   Dobutamine Stress Test with Color Flow:   Results for orders placed or performed during the hospital encounter of 10/19/17   Dobutamine Stress Test w/ Color Flow   Result Value Ref Range    EF 73 55 - 65    Diastolic Dysfunction No     Est. PA Systolic Pressure 21.32     Tricuspid Valve Regurgitation TRIVIAL

## 2017-11-29 NOTE — TELEPHONE ENCOUNTER
KARLA returned call to pt's daughter who was inquiring about the status of pt's liver transplant listing. SW notified pt's daughter that pt will not be discussed until Friday's selection. Pt's daughter confirmed that she recently gave birth to a daughter on Monday. SW congratulated her and daughter confirmed that the baby was healthy. SW will contact the pt's daughter once a decision has been made on pt's case. Pt's daughter reports that pt was actively enrolled in treatment prior to coming back into the hospital. Daughter states that pt did not complete the program due to medical illness but is motivated to return if recommended by the team. SW remains available.

## 2017-11-29 NOTE — TELEPHONE ENCOUNTER
----- Message from Jessica Webb sent at 11/29/2017 12:57 PM CST -----  Contact: Viola Bazan (Daughter)  Daughter would like the status of pts transplant board meeting      Contact number [phone]  Thanks

## 2017-11-29 NOTE — PROGRESS NOTES
[facility] Hospital Medicine  Progress Note    Patient Name: Marcie Bazan  MRN: [id number]  Patient Class: IP- Inpatient   Admission Date: 11/23/2017  Length of Stay: 5 days  Expected Discharge Date: 12/1/2017  Attending Physician: Surinder Madrigal MD  Primary Care Provider: Milton Ferrer, [facility] Medicine Team: [facility] MED A Surinder Madrigal MD  Principal Problem:Decompensated hepatic cirrhosis    Subjective:     HPI:   52 year old female with PMHx of 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 Patient presented to [facility] with generalized weakness. Onset two months ago, progressively worsening. Was admitted to  11/10 for hyponatremia seen on labs in GI office. Discharged on 11/15 and returned on 11/20 with 1 week worsening weakness, malaise, lethargy, and decreased appetite. Denied fever/chills, abdominal pain, bright red or black tarry stools, hematemesis or other abnormal bleeding, confusion or disorientation, changes in urination, light-headedness, headache, changes in vision, dyspnea, chest pain or palpitations. Labs demonstrated Na 116 and MELD 32; patient admitted for hyponatremia. Nephrology consulted, recommended fluid restriction , holding diuretics, and prednisode taper that started 11/23 (decreased from 40mg QD to 20mg Qd.) Patient transferred to [facility] for revaluation of liver disease. Of note, paracentesis performed 11/22, removed 2L, negative for SBP.    Follows with Dr. Souza for cirrhosis. Since early 09/2017, patient's liver failure progressing indicated by worsening clinical signs (abdominal distention, weakness/fatigue) and MELD. In mid-October, patient's PETH demonstrated EtOH use and she was denied candidacy for liver transplant. She was then enrolled in in-patient substance abuse program at [facility]  where she has remained since early November; per patient, her last EtOH beverage was 11/01/2017. Denies history of GI bleed, SBP, renal disease, or other complications of cirrhosis. Liver biopsy 10/24 demonstrated stage 4 cirrhosis. Records reports EGD in August 2016 that showed small esophageal varices with severe gastritis. Colonoscopy 2015 with 2 polyps and internal hemorrhoids Started on prednisone 40mg QD on 10/25; was on spironolactone, has been held since 11/10 for hypoNa; on lactulose.    Hospital Course:  Mental status improving with hepatic encephalopathy treatment. Started empiric ceftriaxone given leukocytosis in immunocompromised state. CMV PCR positive overnight, for which ID was consulted, recommended repeating titers in 1 week. Seen by Psychiatry and  who determined she was moderate risk for Liver transplant. Will proceed with EGD/Cscope and mammogram.     Interval History: Patient considered moderate risk for relapse of alcohol. Will initiate liver transplant evaluation.     Review of Systems   Constitutional: Negative for chills and fever.   HENT: Negative for rhinorrhea and sore throat.    Eyes: Negative for pain and discharge.   Respiratory: Negative for cough and shortness of breath.    Cardiovascular: Negative for chest pain and leg swelling.   Gastrointestinal: Positive for abdominal distention and diarrhea. Negative for abdominal pain, blood in stool, nausea and vomiting.   Endocrine: Negative for cold intolerance and heat intolerance.   Genitourinary: Negative for dysuria and flank pain.   Neurological: Negative for dizziness and numbness.   Psychiatric/Behavioral: Positive for confusion. Negative for behavioral problems.     Objective:     Vital Signs (Most Recent):  Temp: 98.7 °F (37.1 °C) (11/28/17 1555)  Pulse: 92 (11/28/17 1555)  Resp: 20 (11/28/17 1555)  BP: 121/61 (11/28/17 1555)  SpO2: 98 % (11/28/17 1555) Vital Signs (24h Range):  Temp:  [97.3 °F (36.3 °C)-98.7 °F (37.1  °C)] 98.7 °F (37.1 °C)  Pulse:  [81-92] 92  Resp:  [16-20] 20  SpO2:  [96 %-99 %] 98 %  BP: (110-121)/(55-64) 121/61     Weight: 69.6 kg (153 lb 7 oz)  Body mass index is 25.53 kg/m².    Intake/Output Summary (Last 24 hours) at 11/28/17 1805  Last data filed at 11/28/17 0559   Gross per 24 hour   Intake              260 ml   Output              750 ml   Net             -490 ml      Physical Exam   Constitutional: She appears well-developed and well-nourished.   HENT:   Head: Normocephalic and atraumatic.   Eyes: Conjunctivae are normal. Pupils are equal, round, and reactive to light. Scleral icterus is present.   Neck: Normal range of motion. No thyromegaly present.   Cardiovascular: Normal rate, regular rhythm and normal heart sounds.    Pulmonary/Chest: Effort normal and breath sounds normal.   Abdominal: Soft. She exhibits distension. There is no tenderness.   Musculoskeletal: Normal range of motion. She exhibits no edema.   Neurological: She is alert.   AAO times 2   Skin: No erythema. No pallor.       MELD-Na score: 34 at 11/28/2017  3:20 PM  MELD score: 30 at 11/28/2017  3:20 PM  Calculated from:  Serum Creatinine: 0.8 mg/dL (Rounded to 1) at 11/28/2017  3:20 PM  Serum Sodium: 123 mmol/L (Rounded to 125) at 11/28/2017  3:20 PM  Total Bilirubin: 27.2 mg/dL at 11/28/2017  3:20 PM  INR(ratio): 2.6 at 11/28/2017  3:40 AM  Age: 52 years    Significant Labs:  CBC:    Recent Labs  Lab 11/27/17  0401 11/28/17  0340   WBC 10.49 7.57   HGB 10.4* 9.2*   HCT 27.8* 24.6*   PLT 88* 69*     CMP:    Recent Labs  Lab 11/27/17  1702 11/28/17  0340 11/28/17  1520   * 124* 123*   K 3.4* 3.4* 3.8   CL 90* 91* 91*   CO2 23 23 24   * 82 115*   BUN 19 16 12   CREATININE 0.8 0.8 0.8   CALCIUM 9.0 8.8 8.8   PROT 5.5* 5.2* 5.5*   ALBUMIN 3.1* 3.1* 3.5   BILITOT 27.6* 26.4* 27.2*   ALKPHOS 249* 229* 222*   * 207* 214*   * 217* 209*   ANIONGAP 10 10 8   EGFRNONAA >60.0 >60.0 >60.0     PTINR:    Recent Labs  Lab  11/27/17  0401 11/28/17  0340   INR 2.4* 2.6*       Significant Procedures:   Dobutamine Stress Test with Color Flow:   Results for orders placed or performed during the hospital encounter of 10/19/17   Dobutamine Stress Test w/ Color Flow   Result Value Ref Range    EF 73 55 - 65    Diastolic Dysfunction No     Est. PA Systolic Pressure 21.32     Tricuspid Valve Regurgitation TRIVIAL          Assessment / Plan:     * Decompensated hepatic cirrhosis    MELD-Na score: 34 at 11/28/2017  3:20 PM  MELD score: 30 at 11/28/2017  3:20 PM  Calculated from:  Serum Creatinine: 0.8 mg/dL (Rounded to 1) at 11/28/2017  3:20 PM  Serum Sodium: 123 mmol/L (Rounded to 125) at 11/28/2017  3:20 PM  Total Bilirubin: 27.2 mg/dL at 11/28/2017  3:20 PM  INR(ratio): 2.6 at 11/28/2017  3:40 AM  Age: 52 years  - hepatology consulted and patient transferred to [facility]  - will ask for clearance for inpatient liver tx evaluation tomorrow  - had half of her evaluation completed on last admission  - will get addiction psych consult for today, PETH pending; based on psych re-evaluation, will determine if patient is a suitable candidate for liver tx; patient was declined last admit        Immunosuppressed status    On chronic prednisone for alcoholic hepatitis; currently 15 mg, would go down by 5 weekly, next change would be 10 mg this saturday          Coagulopathy    - vitamin k for 3 days          Alcoholic cirrhosis of liver with ascites    - see decompensated cirrhosis           Cytomegalovirus (CMV) viremia    -- ID evaluated and do not feel it needs to be treated        Thrombocytopenia    -- monitor           Abnormal liver enzymes    -- Stable  -- Etiology liver failure; see that section for more details          Alcoholic hepatitis with ascites- decompensated with elevated MELD    - cont tapering prednisone, currently at 15 mg        Hyponatremia    - on 800 cc fluid restriction; on albumin; improved from 120 to 122, cont albumin for another  day        End stage liver disease              HCC (hepatocellular carcinoma)    AFP 9; seen on CT on 10/19, 1 cm focus, monitor for now          Hepatic encephalopathy    -- Improving, though not yet returned to baseline, AAO times 3; put out 1 L in flexi-seal  -- Continue lactulose and rifaximin          Severe protein-calorie malnutrition    PAB, boost and dietary consult          Ascites    -- Holding diuretics at present, s/p 2L removed on 11/22 with testing negative for SBP  -- No abdominal pain today              Essential hypertension    - Stable  - Holding home lisinopril in setting of decompensated liver disease          Anemia of chronic disease    - monitor daily                VTE Risk Mitigation         Ordered     Place sequential compression device  Until discontinued      11/24/17 0734     Place FABIOLA hose  Until discontinued      11/23/17 2305     Medium Risk of VTE  Once      11/23/17 2305           Discharge Disposition: pending liver transplant evaluation    Time taken to evaluate, plan, and coordinate patient care: 35 minutes.    Surinder Madrigal MD  Department of Hospital Medicine  [facility]

## 2017-11-29 NOTE — PLAN OF CARE
Problem: Occupational Therapy Goal  Goal: Occupational Therapy Goal  Goals to be met by: 12/10/17     Patient will increase functional independence with ADLs by performing:    UE Dressing with Set-up Assistance and Supervision.  LE Dressing with Minimal Assistance.  Grooming while standing at sink with Contact Guard Assistance.  Toileting from toilet with Stand-by Assistance for hygiene and clothing management.   Supine to sit with Supervision.  Stand pivot transfers with Contact Guard Assistance.  Bathing while EOB with Setup Assistance and Supervision.  Toilet transfer to toilet with Contact Guard Assistance.     Outcome: Ongoing (interventions implemented as appropriate)  Goals remain appropriate. Cont POC.    JA Potts  11/29/2017  Pager: [phone]

## 2017-11-29 NOTE — PHARMACY MED REC
"Admission Medication Reconciliation - Pharmacy Consult Note    The home medication history was taken by Miley Yo Pharmacy Tech. Based on information gathered and subsequent review by the clinical pharmacist, the items below may need attention.    You may go to "Admission" then "Reconcile Home Medications" tabs to review and/or act upon these items.        No issues noted with the medication reconciliation.      Zo Ballard, PharmD  [id number]      Patient's prior to admission medication regimen was as follows:  Prescriptions Prior to Admission   Medication Sig    cetirizine (ZYRTEC) 10 MG tablet Take 10 mg by mouth once daily.    furosemide (LASIX) 40 MG tablet Take 40 mg by mouth daily as needed (for fluid).    lactulose (CHRONULAC) 10 gram/15 mL solution Take 45 ml by mouth four times daily    omeprazole (PRILOSEC) 40 MG capsule Take 40 mg by mouth once daily.    ondansetron (ZOFRAN-ODT) 4 MG TbDL Take 1 tablet (4 mg total) by mouth every 8 (eight) hours as needed (nausea and vomiting).    potassium chloride SA (K-DUR,KLOR-CON) 20 MEQ tablet Taking only when taking lasix    predniSONE (DELTASONE) 20 MG tablet Take 40 mg by mouth once daily.    lisinopril (PRINIVIL,ZESTRIL) 5 MG tablet Take 1 tablet by mouth only as needed for high blood pressure    spironolactone (ALDACTONE) 100 MG tablet Hold until follow-up with GI         Please add appropriate    SmartPhrase below:        "

## 2017-11-29 NOTE — PLAN OF CARE
Problem: Patient Care Overview  Goal: Plan of Care Review  Outcome: Ongoing (interventions implemented as appropriate)  Discussed plan of care with the patient and her daughter, including medications given and diet.  Patient has flexi seal in place.  She remained stable throughout the shift.

## 2017-11-29 NOTE — ASSESSMENT & PLAN NOTE
MELD-Na score: 34 at 11/28/2017  3:20 PM  MELD score: 30 at 11/28/2017  3:20 PM  Calculated from:  Serum Creatinine: 0.8 mg/dL (Rounded to 1) at 11/28/2017  3:20 PM  Serum Sodium: 123 mmol/L (Rounded to 125) at 11/28/2017  3:20 PM  Total Bilirubin: 27.2 mg/dL at 11/28/2017  3:20 PM  INR(ratio): 2.6 at 11/28/2017  3:40 AM  Age: 52 years  - hepatology consulted and patient transferred to [facility]  - will ask for clearance for inpatient liver tx evaluation tomorrow  - had half of her evaluation completed on last admission  - will get addiction psych consult for today, PETH pending; based on psych re-evaluation, will determine if patient is a suitable candidate for liver tx; patient was declined last admit

## 2017-11-29 NOTE — PLAN OF CARE
Problem: Patient Care Overview  Goal: Plan of Care Review  Outcome: Ongoing (interventions implemented as appropriate)  Discussed patient's plan of care with her, including medications given.  In and out catheterization performed 400ml urine removed.  Patient remained stable throughout the shift.

## 2017-11-30 ENCOUNTER — ANESTHESIA EVENT (OUTPATIENT)
Dept: ENDOSCOPY | Facility: HOSPITAL | Age: 52
DRG: 005 | End: 2017-11-30
Payer: COMMERCIAL

## 2017-11-30 LAB
ABO + RH BLD: NORMAL
ALBUMIN SERPL BCP-MCNC: 3.6 G/DL
ALBUMIN SERPL BCP-MCNC: 4 G/DL
ALP SERPL-CCNC: 169 U/L
ALP SERPL-CCNC: 182 U/L
ALT SERPL W/O P-5'-P-CCNC: 128 U/L
ALT SERPL W/O P-5'-P-CCNC: 144 U/L
ANION GAP SERPL CALC-SCNC: 9 MMOL/L
ANION GAP SERPL CALC-SCNC: 9 MMOL/L
ANISOCYTOSIS BLD QL SMEAR: SLIGHT
AST SERPL-CCNC: 130 U/L
AST SERPL-CCNC: 142 U/L
BACTERIA SPEC ANAEROBE CULT: NORMAL
BASOPHILS # BLD AUTO: 0.01 K/UL
BASOPHILS NFR BLD: 0.1 %
BILIRUB SERPL-MCNC: 23.5 MG/DL
BILIRUB SERPL-MCNC: 24.6 MG/DL
BLD GP AB SCN CELLS X3 SERPL QL: NORMAL
BUN SERPL-MCNC: 10 MG/DL
BUN SERPL-MCNC: 9 MG/DL
CALCIUM SERPL-MCNC: 8.8 MG/DL
CALCIUM SERPL-MCNC: 9.2 MG/DL
CHLORIDE SERPL-SCNC: 92 MMOL/L
CHLORIDE SERPL-SCNC: 93 MMOL/L
CO2 SERPL-SCNC: 23 MMOL/L
CO2 SERPL-SCNC: 26 MMOL/L
CREAT SERPL-MCNC: 0.7 MG/DL
CREAT SERPL-MCNC: 0.8 MG/DL
DIFFERENTIAL METHOD: ABNORMAL
EOSINOPHIL # BLD AUTO: 0 K/UL
EOSINOPHIL NFR BLD: 0.4 %
ERYTHROCYTE [DISTWIDTH] IN BLOOD BY AUTOMATED COUNT: 24 %
EST. GFR  (AFRICAN AMERICAN): >60 ML/MIN/1.73 M^2
EST. GFR  (AFRICAN AMERICAN): >60 ML/MIN/1.73 M^2
EST. GFR  (NON AFRICAN AMERICAN): >60 ML/MIN/1.73 M^2
EST. GFR  (NON AFRICAN AMERICAN): >60 ML/MIN/1.73 M^2
GLUCOSE SERPL-MCNC: 121 MG/DL
GLUCOSE SERPL-MCNC: 82 MG/DL
HCT VFR BLD AUTO: 23.2 %
HGB BLD-MCNC: 8.6 G/DL
HYPOCHROMIA BLD QL SMEAR: ABNORMAL
IMM GRANULOCYTES # BLD AUTO: 0.04 K/UL
IMM GRANULOCYTES NFR BLD AUTO: 0.6 %
INR PPP: 3
LYMPHOCYTES # BLD AUTO: 2 K/UL
LYMPHOCYTES NFR BLD: 28.7 %
MAGNESIUM SERPL-MCNC: 1.8 MG/DL
MCH RBC QN AUTO: 30.5 PG
MCHC RBC AUTO-ENTMCNC: 37.1 G/DL
MCV RBC AUTO: 82 FL
MONOCYTES # BLD AUTO: 0.6 K/UL
MONOCYTES NFR BLD: 9.1 %
NEUTROPHILS # BLD AUTO: 4.2 K/UL
NEUTROPHILS NFR BLD: 61.1 %
NRBC BLD-RTO: 0 /100 WBC
OVALOCYTES BLD QL SMEAR: ABNORMAL
PHOSPHATE SERPL-MCNC: 2.2 MG/DL
PHOSPHATIDYLETHANOL (PETH): NEGATIVE NG/ML
PLATELET # BLD AUTO: 59 K/UL
PMV BLD AUTO: ABNORMAL FL
POIKILOCYTOSIS BLD QL SMEAR: SLIGHT
POLYCHROMASIA BLD QL SMEAR: ABNORMAL
POTASSIUM SERPL-SCNC: 3.5 MMOL/L
POTASSIUM SERPL-SCNC: 3.9 MMOL/L
PROT SERPL-MCNC: 5.3 G/DL
PROT SERPL-MCNC: 5.6 G/DL
PROTHROMBIN TIME: 29.8 SEC
RBC # BLD AUTO: 2.82 M/UL
SODIUM SERPL-SCNC: 125 MMOL/L
SODIUM SERPL-SCNC: 127 MMOL/L
TARGETS BLD QL SMEAR: ABNORMAL
WBC # BLD AUTO: 6.83 K/UL

## 2017-11-30 PROCEDURE — 84100 ASSAY OF PHOSPHORUS: CPT

## 2017-11-30 PROCEDURE — 63600175 PHARM REV CODE 636 W HCPCS: Performed by: HOSPITALIST

## 2017-11-30 PROCEDURE — 25000003 PHARM REV CODE 250: Performed by: STUDENT IN AN ORGANIZED HEALTH CARE EDUCATION/TRAINING PROGRAM

## 2017-11-30 PROCEDURE — 99233 SBSQ HOSP IP/OBS HIGH 50: CPT | Mod: ,,, | Performed by: HOSPITALIST

## 2017-11-30 PROCEDURE — 25000003 PHARM REV CODE 250: Performed by: HOSPITALIST

## 2017-11-30 PROCEDURE — 36415 COLL VENOUS BLD VENIPUNCTURE: CPT

## 2017-11-30 PROCEDURE — 99233 SBSQ HOSP IP/OBS HIGH 50: CPT | Mod: ,,, | Performed by: INTERNAL MEDICINE

## 2017-11-30 PROCEDURE — 25000003 PHARM REV CODE 250: Performed by: INTERNAL MEDICINE

## 2017-11-30 PROCEDURE — 86901 BLOOD TYPING SEROLOGIC RH(D): CPT

## 2017-11-30 PROCEDURE — 85610 PROTHROMBIN TIME: CPT

## 2017-11-30 PROCEDURE — P9017 PLASMA 1 DONOR FRZ W/IN 8 HR: HCPCS

## 2017-11-30 PROCEDURE — 97116 GAIT TRAINING THERAPY: CPT

## 2017-11-30 PROCEDURE — P9047 ALBUMIN (HUMAN), 25%, 50ML: HCPCS | Performed by: HOSPITALIST

## 2017-11-30 PROCEDURE — 99232 SBSQ HOSP IP/OBS MODERATE 35: CPT | Mod: ,,, | Performed by: INTERNAL MEDICINE

## 2017-11-30 PROCEDURE — 97110 THERAPEUTIC EXERCISES: CPT

## 2017-11-30 PROCEDURE — 11000001 HC ACUTE MED/SURG PRIVATE ROOM

## 2017-11-30 PROCEDURE — 85025 COMPLETE CBC W/AUTO DIFF WBC: CPT

## 2017-11-30 PROCEDURE — 80053 COMPREHEN METABOLIC PANEL: CPT

## 2017-11-30 PROCEDURE — 83735 ASSAY OF MAGNESIUM: CPT

## 2017-11-30 RX ORDER — HYDROCODONE BITARTRATE AND ACETAMINOPHEN 500; 5 MG/1; MG/1
TABLET ORAL
Status: DISCONTINUED | OUTPATIENT
Start: 2017-11-30 | End: 2017-12-01

## 2017-11-30 RX ORDER — POLYETHYLENE GLYCOL 3350, SODIUM SULFATE ANHYDROUS, SODIUM BICARBONATE, SODIUM CHLORIDE, POTASSIUM CHLORIDE 236; 22.74; 6.74; 5.86; 2.97 G/4L; G/4L; G/4L; G/4L; G/4L
4000 POWDER, FOR SOLUTION ORAL ONCE
Status: COMPLETED | OUTPATIENT
Start: 2017-11-30 | End: 2017-11-30

## 2017-11-30 RX ORDER — SODIUM,POTASSIUM PHOSPHATES 280-250MG
1 POWDER IN PACKET (EA) ORAL
Status: DISPENSED | OUTPATIENT
Start: 2017-11-30 | End: 2017-12-01

## 2017-11-30 RX ADMIN — POTASSIUM & SODIUM PHOSPHATES POWDER PACK 280-160-250 MG 1 PACKET: 280-160-250 PACK at 01:11

## 2017-11-30 RX ADMIN — RIFAXIMIN 550 MG: 550 TABLET ORAL at 09:11

## 2017-11-30 RX ADMIN — RIFAXIMIN 550 MG: 550 TABLET ORAL at 08:11

## 2017-11-30 RX ADMIN — POLYETHYLENE GLYCOL 3350, SODIUM SULFATE ANHYDROUS, SODIUM BICARBONATE, SODIUM CHLORIDE, POTASSIUM CHLORIDE 2000 ML: 236; 22.74; 6.74; 5.86; 2.97 POWDER, FOR SOLUTION ORAL at 06:11

## 2017-11-30 RX ADMIN — ALBUMIN (HUMAN) 25 G: 12.5 SOLUTION INTRAVENOUS at 09:11

## 2017-11-30 RX ADMIN — PANTOPRAZOLE SODIUM 40 MG: 40 TABLET, DELAYED RELEASE ORAL at 08:11

## 2017-11-30 RX ADMIN — POTASSIUM & SODIUM PHOSPHATES POWDER PACK 280-160-250 MG 1 PACKET: 280-160-250 PACK at 06:11

## 2017-11-30 RX ADMIN — PREDNISONE 15 MG: 5 TABLET ORAL at 08:11

## 2017-11-30 RX ADMIN — LACTULOSE 20 G: 20 SOLUTION ORAL at 01:11

## 2017-11-30 RX ADMIN — ALBUMIN (HUMAN) 25 G: 12.5 SOLUTION INTRAVENOUS at 06:11

## 2017-11-30 RX ADMIN — LACTULOSE 20 G: 20 SOLUTION ORAL at 09:11

## 2017-11-30 RX ADMIN — ALBUMIN (HUMAN) 25 G: 12.5 SOLUTION INTRAVENOUS at 01:11

## 2017-11-30 RX ADMIN — POTASSIUM & SODIUM PHOSPHATES POWDER PACK 280-160-250 MG 1 PACKET: 280-160-250 PACK at 09:11

## 2017-11-30 NOTE — ASSESSMENT & PLAN NOTE
MELD-Na score: 34 at 11/29/2017  4:05 PM  MELD score: 30 at 11/29/2017  4:05 PM  Calculated from:  Serum Creatinine: 0.8 mg/dL (Rounded to 1) at 11/29/2017  4:05 PM  Serum Sodium: 126 mmol/L at 11/29/2017  4:05 PM  Total Bilirubin: 26.0 mg/dL at 11/29/2017  4:05 PM  INR(ratio): 2.8 at 11/29/2017  4:35 AM  Age: 52 years   - will ask for clearance for inpatient liver tx evaluation tomorrow  - had half of her evaluation completed on last admission  - AS per Psychiatry patient is moderate risk, will proceed with Liver transplant evaluation

## 2017-11-30 NOTE — TREATMENT PLAN
Hepatology Note    Plan for EGD and colonoscopy Friday.   Needs clear liquid diet and bowel prep - orders placed.   NPO PMN  Needs FFP and possible Plt prior to procedure.   Please ensure INR is <2.    Ginna Ren MD  Gastroenterology & Hepatology Fellow  Pager: [phone]

## 2017-11-30 NOTE — SUBJECTIVE & OBJECTIVE
Interval History: Patient considered moderate risk for relapse of alcohol. EGD and colonoscopy tomorrow.     Review of Systems   Constitutional: Negative for chills and fever.   HENT: Negative for rhinorrhea and sore throat.    Eyes: Negative for pain and discharge.   Respiratory: Negative for cough and shortness of breath.    Cardiovascular: Negative for chest pain and leg swelling.   Gastrointestinal: Positive for abdominal distention and diarrhea. Negative for abdominal pain, blood in stool, nausea and vomiting.   Endocrine: Negative for cold intolerance and heat intolerance.   Genitourinary: Negative for dysuria and flank pain.   Neurological: Negative for dizziness and numbness.   Psychiatric/Behavioral: Positive for confusion. Negative for behavioral problems.     Objective:     Vital Signs (Most Recent):  Temp: 97.7 °F (36.5 °C) (11/30/17 1525)  Pulse: 85 (11/30/17 1525)  Resp: 18 (11/30/17 1525)  BP: 106/62 (11/30/17 1525)  SpO2: 96 % (11/30/17 1525) Vital Signs (24h Range):  Temp:  [97.7 °F (36.5 °C)-98.9 °F (37.2 °C)] 97.7 °F (36.5 °C)  Pulse:  [83-88] 85  Resp:  [16-18] 18  SpO2:  [93 %-97 %] 96 %  BP: ()/(55-62) 106/62     Weight: 69.6 kg (153 lb 7 oz)  Body mass index is 25.53 kg/m².    Intake/Output Summary (Last 24 hours) at 11/30/17 1540  Last data filed at 11/30/17 0300   Gross per 24 hour   Intake              720 ml   Output             1000 ml   Net             -280 ml      Physical Exam   Constitutional: She appears well-developed and well-nourished.   HENT:   Head: Normocephalic and atraumatic.   Eyes: Conjunctivae are normal. Pupils are equal, round, and reactive to light. Scleral icterus is present.   Neck: Normal range of motion. No thyromegaly present.   Cardiovascular: Normal rate, regular rhythm and normal heart sounds.    Pulmonary/Chest: Effort normal and breath sounds normal.   Abdominal: Soft. She exhibits distension. There is no tenderness.   Musculoskeletal: Normal range of  motion. She exhibits no edema.   Neurological: She is alert.   AAO times 2   Skin: No erythema. No pallor.       MELD-Na score: 35 at 11/30/2017  3:57 AM  MELD score: 31 at 11/30/2017  3:57 AM  Calculated from:  Serum Creatinine: 0.7 mg/dL (Rounded to 1) at 11/30/2017  3:57 AM  Serum Sodium: 125 mmol/L at 11/30/2017  3:57 AM  Total Bilirubin: 24.6 mg/dL at 11/30/2017  3:57 AM  INR(ratio): 3.0 at 11/30/2017  3:57 AM  Age: 52 years    Significant Labs:  CBC:    Recent Labs  Lab 11/29/17 0435 11/30/17 0357   WBC 7.29 6.83   HGB 8.9* 8.6*   HCT 24.4* 23.2*   PLT 65* 59*     CMP:    Recent Labs  Lab 11/29/17 0435 11/29/17  1605 11/30/17 0357   * 126* 125*   K 3.5 3.8 3.5   CL 92* 91* 93*   CO2 25 25 23   GLU 89 117* 82   BUN 10 10 9   CREATININE 0.8 0.8 0.7   CALCIUM 9.1 9.2 8.8   PROT 5.4* 5.7* 5.3*   ALBUMIN 3.6 3.8 3.6   BILITOT 25.1* 26.0* 24.6*   ALKPHOS 197* 205* 182*   * 180* 142*   * 170* 144*   ANIONGAP 10 10 9   EGFRNONAA >60.0 >60.0 >60.0     PTINR:    Recent Labs  Lab 11/29/17 0435 11/30/17 0357   INR 2.8* 3.0*       Significant Procedures:   Dobutamine Stress Test with Color Flow:   Results for orders placed or performed during the hospital encounter of 10/19/17   Dobutamine Stress Test w/ Color Flow   Result Value Ref Range    EF 73 55 - 65    Diastolic Dysfunction No     Est. PA Systolic Pressure 21.32     Tricuspid Valve Regurgitation TRIVIAL

## 2017-11-30 NOTE — ASSESSMENT & PLAN NOTE
MELD-Na score: 35 at 11/30/2017  3:57 AM  MELD score: 31 at 11/30/2017  3:57 AM  Calculated from:  Serum Creatinine: 0.7 mg/dL (Rounded to 1) at 11/30/2017  3:57 AM  Serum Sodium: 125 mmol/L at 11/30/2017  3:57 AM  Total Bilirubin: 24.6 mg/dL at 11/30/2017  3:57 AM  INR(ratio): 3.0 at 11/30/2017  3:57 AM  Age: 52 years    Initially admitted to [facility] with decompensation. Has been attending inpatient etoh rehab.   Previously deferred due to lack of caregiver plan but this seems more stable now per our social work.      Plan:  - continue Prednisone Taper  - Pt seen by addiction psych ruled moderate risk w/ available support system   - evaluated by social work  - continue lactulose and rifaximin, titrate to 3-4 BMs/day   - Recommend removal of Fecal Management System as pt is awake able to ambulate to bedside commode   - Bedside commode ordered   - C Diff (-)  - CMP and INR daily  - UCx + w/ >100,000 cfu/ml E Coli sensitive to rocephin, completed day 3 rocephin 11/27  - PETH test from 11/24 is Negative   - 0.8 L free water restriction in light of hyponatremia (please ensure enforcement of restriction)  - pt w/ results of + CMV PCR load, agree w/ ID recs, no evidence of end organ damage, monitor for now, will consider treatment down the line if transplant is pursued   - proceed w/ EGD, C-Scope, Mammogram to complete workup

## 2017-11-30 NOTE — PROGRESS NOTES
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 Patient presented to [facility] with generalized weakness. Onset two months ago, progressively worsening. Was admitted to  11/10 for hyponatremia seen on labs in GI office. Discharged on 11/15 and returned on 11/20 with 1 week worsening weakness, malaise, lethargy, and decreased appetite. Denied fever/chills, abdominal pain, bright red or black tarry stools, hematemesis or other abnormal bleeding, confusion or disorientation, changes in urination, light-headedness, headache, changes in vision, dyspnea, chest pain or palpitations. Labs demonstrated Na 116 and MELD 32; patient admitted for hyponatremia. Nephrology consulted, recommended fluid restriction , holding diuretics, and prednisode taper that started 11/23 (decreased from 40mg QD to 20mg Qd.) Patient transferred to [facility] for revaluation of liver disease. Of note, paracentesis performed 11/22, removed 2L, negative for SBP.    Follows with Dr. Souza for cirrhosis. Since early 09/2017, patient's liver failure progressing indicated by worsening clinical signs (abdominal distention, weakness/fatigue) and MELD. In mid-October, patient's PETH demonstrated EtOH use and she was denied candidacy for liver transplant. She was then enrolled in in-patient substance abuse program at [facility]  where she has remained since early November; per patient, her last EtOH beverage was 11/01/2017. Denies history of GI bleed, SBP, renal disease, or other complications of cirrhosis. Liver biopsy 10/24 demonstrated stage 4 cirrhosis. Records reports EGD in August 2016 that showed small esophageal varices with severe gastritis. Colonoscopy 2015 with 2 polyps and internal hemorrhoids Started on prednisone 40mg QD on 10/25; was on spironolactone, has been held since 11/10 for hypoNa; on lactulose.    Hospital Course:  Mental status improving with hepatic encephalopathy treatment. Started empiric ceftriaxone given leukocytosis in immunocompromised state. CMV PCR positive overnight, for which ID was consulted, recommended repeating titers in 1 week. Seen by Psychiatry and  who determined she was moderate risk for Liver transplant. Will proceed with EGD/Cscope and mammogram after FFP.     Interval History: Patient considered moderate risk for relapse of alcohol. EGD and colonoscopy tomorrow.     Review of Systems   Constitutional: Negative for chills and fever.   HENT: Negative for rhinorrhea and sore throat.    Eyes: Negative for pain and discharge.   Respiratory: Negative for cough and shortness of breath.    Cardiovascular: Negative for chest pain and leg swelling.   Gastrointestinal: Positive for abdominal distention and diarrhea. Negative for abdominal pain, blood in stool, nausea and vomiting.   Endocrine: Negative for cold intolerance and heat intolerance.   Genitourinary: Negative for dysuria and flank pain.   Neurological: Negative for dizziness and numbness.   Psychiatric/Behavioral: Positive for confusion. Negative for behavioral problems.     Objective:     Vital Signs (Most Recent):  Temp: 97.7 °F (36.5 °C) (11/30/17 1525)  Pulse: 85 (11/30/17 1525)  Resp: 18 (11/30/17 1525)  BP: 106/62 (11/30/17 1525)  SpO2: 96 % (11/30/17 1525) Vital Signs (24h Range):  Temp:  [97.7 °F (36.5 °C)-98.9 °F (37.2  °C)] 97.7 °F (36.5 °C)  Pulse:  [83-88] 85  Resp:  [16-18] 18  SpO2:  [93 %-97 %] 96 %  BP: ()/(55-62) 106/62     Weight: 69.6 kg (153 lb 7 oz)  Body mass index is 25.53 kg/m².    Intake/Output Summary (Last 24 hours) at 11/30/17 1540  Last data filed at 11/30/17 0300   Gross per 24 hour   Intake              720 ml   Output             1000 ml   Net             -280 ml      Physical Exam   Constitutional: She appears well-developed and well-nourished.   HENT:   Head: Normocephalic and atraumatic.   Eyes: Conjunctivae are normal. Pupils are equal, round, and reactive to light. Scleral icterus is present.   Neck: Normal range of motion. No thyromegaly present.   Cardiovascular: Normal rate, regular rhythm and normal heart sounds.    Pulmonary/Chest: Effort normal and breath sounds normal.   Abdominal: Soft. She exhibits distension. There is no tenderness.   Musculoskeletal: Normal range of motion. She exhibits no edema.   Neurological: She is alert.   AAO times 2   Skin: No erythema. No pallor.       MELD-Na score: 35 at 11/30/2017  3:57 AM  MELD score: 31 at 11/30/2017  3:57 AM  Calculated from:  Serum Creatinine: 0.7 mg/dL (Rounded to 1) at 11/30/2017  3:57 AM  Serum Sodium: 125 mmol/L at 11/30/2017  3:57 AM  Total Bilirubin: 24.6 mg/dL at 11/30/2017  3:57 AM  INR(ratio): 3.0 at 11/30/2017  3:57 AM  Age: 52 years    Significant Labs:  CBC:    Recent Labs  Lab 11/29/17  0435 11/30/17  0357   WBC 7.29 6.83   HGB 8.9* 8.6*   HCT 24.4* 23.2*   PLT 65* 59*     CMP:    Recent Labs  Lab 11/29/17  0435 11/29/17  1605 11/30/17  0357   * 126* 125*   K 3.5 3.8 3.5   CL 92* 91* 93*   CO2 25 25 23   GLU 89 117* 82   BUN 10 10 9   CREATININE 0.8 0.8 0.7   CALCIUM 9.1 9.2 8.8   PROT 5.4* 5.7* 5.3*   ALBUMIN 3.6 3.8 3.6   BILITOT 25.1* 26.0* 24.6*   ALKPHOS 197* 205* 182*   * 180* 142*   * 170* 144*   ANIONGAP 10 10 9   EGFRNONAA >60.0 >60.0 >60.0     PTINR:    Recent Labs  Lab 11/29/17  0435  11/30/17  0357   INR 2.8* 3.0*       Significant Procedures:   Dobutamine Stress Test with Color Flow:   Results for orders placed or performed during the hospital encounter of 10/19/17   Dobutamine Stress Test w/ Color Flow   Result Value Ref Range    EF 73 55 - 65    Diastolic Dysfunction No     Est. PA Systolic Pressure 21.32     Tricuspid Valve Regurgitation TRIVIAL          Assessment / Plan:     * Decompensated hepatic cirrhosis    MELD-Na score: 35 at 11/30/2017  3:57 AM  MELD score: 31 at 11/30/2017  3:57 AM  Calculated from:  Serum Creatinine: 0.7 mg/dL (Rounded to 1) at 11/30/2017  3:57 AM  Serum Sodium: 125 mmol/L at 11/30/2017  3:57 AM  Total Bilirubin: 24.6 mg/dL at 11/30/2017  3:57 AM  INR(ratio): 3.0 at 11/30/2017  3:57 AM  Age: 52 years   - will ask for clearance for inpatient liver tx evaluation tomorrow  - had half of her evaluation completed on last admission  - AS per Psychiatry patient is moderate risk, will proceed with Liver transplant evaluation        Immunosuppressed status    On chronic prednisone for alcoholic hepatitis; currently 15 mg, would go down by 5 weekly, next change would be 10 mg this saturday          Coagulopathy    - vitamin k for 3 days  - transfuse 2 units of FFP prior to EGD/colonoscopy on 12/01/17          Alcoholic cirrhosis of liver with ascites    - see decompensated cirrhosis           Cytomegalovirus (CMV) viremia    -- ID evaluated and do not feel it needs to be treated        Thrombocytopenia    -- monitor           Abnormal liver enzymes    -- Stable  -- Etiology liver failure; see that section for more details          Alcoholic hepatitis with ascites- decompensated with elevated MELD    - cont tapering prednisone, currently at 15 mg        Hyponatremia    - on 800 cc fluid restriction; on albumin  - 116-->118-->120-->122-->123-->126-->125        End stage liver disease              HCC (hepatocellular carcinoma)    AFP 9; seen on CT on 10/19, 1 cm focus,  monitor for now          Hepatic encephalopathy    -- Improving, though not yet returned to baseline, AAO times 3; put out 1 L in flexi-seal  -- Continue lactulose and rifaximin          Severe protein-calorie malnutrition    PAB, boost and dietary consult          Ascites    -- Holding diuretics at present, s/p 2L removed on 11/22 with testing negative for SBP  -- No abdominal pain today              Essential hypertension    - Stable  - Holding home lisinopril in setting of decompensated liver disease          Anemia of chronic disease    - monitor daily                VTE Risk Mitigation         Ordered     Place sequential compression device  Until discontinued      11/24/17 0734     Place FABIOLA hose  Until discontinued      11/23/17 2305     Medium Risk of VTE  Once      11/23/17 2305           Discharge Disposition: Pending Liver transplant evaluation     Time taken to evaluate, plan, and coordinate patient care: 35 minutes.    Surinder Madrigal MD  Department of Hospital Medicine  [facility]

## 2017-11-30 NOTE — SUBJECTIVE & OBJECTIVE
Interval History: Patient considered moderate risk for relapse of alcohol. Will initiate liver transplant evaluation.     Review of Systems   Constitutional: Negative for chills and fever.   HENT: Negative for rhinorrhea and sore throat.    Eyes: Negative for pain and discharge.   Respiratory: Negative for cough and shortness of breath.    Cardiovascular: Negative for chest pain and leg swelling.   Gastrointestinal: Positive for abdominal distention and diarrhea. Negative for abdominal pain, blood in stool, nausea and vomiting.   Endocrine: Negative for cold intolerance and heat intolerance.   Genitourinary: Negative for dysuria and flank pain.   Neurological: Negative for dizziness and numbness.   Psychiatric/Behavioral: Positive for confusion. Negative for behavioral problems.     Objective:     Vital Signs (Most Recent):  Temp: 98.6 °F (37 °C) (11/29/17 1121)  Pulse: 94 (11/29/17 1121)  Resp: 20 (11/29/17 1121)  BP: (!) 114/57 (11/29/17 1121)  SpO2: (!) 93 % (11/29/17 1121) Vital Signs (24h Range):  Temp:  [98.1 °F (36.7 °C)-99.1 °F (37.3 °C)] 98.6 °F (37 °C)  Pulse:  [86-94] 94  Resp:  [16-20] 20  SpO2:  [93 %-99 %] 93 %  BP: (108-117)/(57-66) 114/57     Weight: 69.6 kg (153 lb 7 oz)  Body mass index is 25.53 kg/m².    Intake/Output Summary (Last 24 hours) at 11/29/17 1907  Last data filed at 11/29/17 0600   Gross per 24 hour   Intake              320 ml   Output              400 ml   Net              -80 ml      Physical Exam   Constitutional: She appears well-developed and well-nourished.   HENT:   Head: Normocephalic and atraumatic.   Eyes: Conjunctivae are normal. Pupils are equal, round, and reactive to light. Scleral icterus is present.   Neck: Normal range of motion. No thyromegaly present.   Cardiovascular: Normal rate, regular rhythm and normal heart sounds.    Pulmonary/Chest: Effort normal and breath sounds normal.   Abdominal: Soft. She exhibits distension. There is no tenderness.   Musculoskeletal:  Normal range of motion. She exhibits no edema.   Neurological: She is alert.   AAO times 2   Skin: No erythema. No pallor.       MELD-Na score: 34 at 11/29/2017  4:05 PM  MELD score: 30 at 11/29/2017  4:05 PM  Calculated from:  Serum Creatinine: 0.8 mg/dL (Rounded to 1) at 11/29/2017  4:05 PM  Serum Sodium: 126 mmol/L at 11/29/2017  4:05 PM  Total Bilirubin: 26.0 mg/dL at 11/29/2017  4:05 PM  INR(ratio): 2.8 at 11/29/2017  4:35 AM  Age: 52 years    Significant Labs:  CBC:    Recent Labs  Lab 11/28/17  0340 11/29/17  0435   WBC 7.57 7.29   HGB 9.2* 8.9*   HCT 24.6* 24.4*   PLT 69* 65*     CMP:    Recent Labs  Lab 11/28/17  1520 11/29/17  0435 11/29/17  1605   * 127* 126*   K 3.8 3.5 3.8   CL 91* 92* 91*   CO2 24 25 25   * 89 117*   BUN 12 10 10   CREATININE 0.8 0.8 0.8   CALCIUM 8.8 9.1 9.2   PROT 5.5* 5.4* 5.7*   ALBUMIN 3.5 3.6 3.8   BILITOT 27.2* 25.1* 26.0*   ALKPHOS 222* 197* 205*   * 177* 180*   * 175* 170*   ANIONGAP 8 10 10   EGFRNONAA >60.0 >60.0 >60.0     PTINR:    Recent Labs  Lab 11/28/17  0340 11/29/17  0435   INR 2.6* 2.8*       Significant Procedures:   Dobutamine Stress Test with Color Flow:   Results for orders placed or performed during the hospital encounter of 10/19/17   Dobutamine Stress Test w/ Color Flow   Result Value Ref Range    EF 73 55 - 65    Diastolic Dysfunction No     Est. PA Systolic Pressure 21.32     Tricuspid Valve Regurgitation TRIVIAL

## 2017-11-30 NOTE — ASSESSMENT & PLAN NOTE
MELD-Na score: 35 at 11/30/2017  3:57 AM  MELD score: 31 at 11/30/2017  3:57 AM  Calculated from:  Serum Creatinine: 0.7 mg/dL (Rounded to 1) at 11/30/2017  3:57 AM  Serum Sodium: 125 mmol/L at 11/30/2017  3:57 AM  Total Bilirubin: 24.6 mg/dL at 11/30/2017  3:57 AM  INR(ratio): 3.0 at 11/30/2017  3:57 AM  Age: 52 years   - will ask for clearance for inpatient liver tx evaluation tomorrow  - had half of her evaluation completed on last admission  - AS per Psychiatry patient is moderate risk, will proceed with Liver transplant evaluation

## 2017-11-30 NOTE — PLAN OF CARE
Problem: Physical Therapy Goal  Goal: Physical Therapy Goal  Goals to be met by: 17    Patient will increase functional independence with mobility by performin. Supine to sit with Set-up [place]  2. Sit to supine with Set-up [place]  3. Sit to stand transfer with Supervision  4. Bed to chair transfer with Supervision using Rolling Walker  5. Gait  x 100 feet with Stand-by Assistance using Rolling Walker.   6. Lower extremity exercise program x20 reps per handout, with independence     Outcome: Ongoing (interventions implemented as appropriate)  PT Goals remain appropriate    Continue POC    Khoi Melendrez, PT  2017

## 2017-11-30 NOTE — ASSESSMENT & PLAN NOTE
53 y/o female with a history of ESLD secondary to alcoholic cirrhosis, arterial HTN transferred to [facility] from [facility] for evaluation of decompensated liver cirrhosis. ID consulted for Blood CMV PCR of 11/21/17 was 266, CMV IgM and IgG positive. Patient has no blurry vision, no GI symptoms, no CNS symptoms.    - CMV viremia is low, seems to be reactivation maybe secondary to acute decompensation of liver diease    - at this moment would not start therapy to avoid side effects of therapy and there are no signs of invasive disease   - would repeat CMV PCR blood today   - if there is plan for immediate liver transplant then would star induction therapy with Ganciclovir at time of transplant and not prophylactic therapy with valcyte to avoid resistance in patient that already has low grade viremia

## 2017-11-30 NOTE — ANESTHESIA PREPROCEDURE EVALUATION
[facility]  Anesthesia Pre-Operative Evaluation         Patient Name: Marcie Bazan  YOB: 1965  MRN: [id number]    SUBJECTIVE:     Pre-operative evaluation for Procedure(s) (LRB):  ESOPHAGOGASTRODUODENOSCOPY (EGD) (N/A)  COLONOSCOPY (N/A)     11/30/2017    Marcie Bazan is a 52 y.o. female w/ a significant PMHx of HTN, ETOH abuse, ELSD with cirrhosis admitted for decompensated liver failure, encephalopathy and hyponatremia undergoing work up for liver Tx. Pt is awake, alert, and oriented to person place and time.     Patient now presents for the above procedure(s).      LDA:        Peripheral IV - Single Lumen 11/26/17 0605 Left Antecubital (Active)   Site Assessment Clean;Dry;Intact;No redness;No swelling 11/30/2017  7:59 AM   Line Status Flushed;Saline locked 11/30/2017  7:59 AM   Dressing Status Clean;Dry;Intact 11/30/2017  7:59 AM   Dressing Intervention Dressing reinforced 11/30/2017  7:59 AM   Dressing Change Due 12/03/17 11/29/2017  8:00 PM   Site Change Due 12/03/17 11/29/2017  8:00 PM   Reason Not Rotated Not due 11/29/2017  8:00 PM   Number of days: 4            Rectal Tube 11/24/17 1300 (Active)   Balloon Inflation Volume (mL) 45 11/26/2017  7:56 AM   Outcome stool evacuated 11/29/2017  8:00 PM   Stool Color Brown 11/29/2017  8:00 PM   Insertion Site Appearance other (see comments) 11/28/2017  9:35 PM   Flush/Irrigation water 11/27/2017  7:22 AM   Rectal Tube Output 0 mL 11/30/2017  3:00 AM   Number of days: 6       Prev airway: None documented    Drips: None documented       Patient Active Problem List   Diagnosis    Allergic rhinitis    Anemia of chronic disease    Decompensated hepatic cirrhosis    Erosive esophagitis    Essential hypertension    Flatulence/gas pain/belching    Hemoptysis    History of abnormal cervical Pap smear    History of Helicobacter pylori infection    Lipoma    Low back pain    Multinodular thyroid    NAFLD (nonalcoholic fatty  liver disease)    Obesity (BMI 30-39.9)    Seasonal allergic rhinitis    Jaundice    Ascites    Hypokalemia    Hypomagnesemia    Elevated AFP    Moderate protein-calorie malnutrition    Severe protein-calorie malnutrition    Hepatic encephalopathy    HCC (hepatocellular carcinoma)    End stage liver disease    Hyponatremia    Alcoholic hepatitis with ascites- decompensated with elevated MELD    Abnormal liver enzymes    Thrombocytopenia    Cytomegalovirus (CMV) viremia    Alcoholic cirrhosis of liver with ascites    Coagulopathy    Alcohol use disorder, severe, dependence    Immunosuppressed status       Review of patient's allergies indicates:   Allergen Reactions    Ferrous sulfate Other (See Comments)     Patient states the pill makes her sick. She stated she would rather have a shot       Current Inpatient Medications:   albumin human 25%  25 g Intravenous TID    lactulose  20 g Oral TID    pantoprazole  40 mg Oral Daily    polyethylene glycol  4,000 mL Oral Once    potassium, sodium phosphates  1 packet Oral QID (AC & HS)    [START ON 12/2/2017] predniSONE  10 mg Oral Daily    Followed by    [START ON 12/9/2017] predniSONE  5 mg Oral Daily    predniSONE  15 mg Oral Daily    rifAXImin  550 mg Oral BID       No current facility-administered medications on file prior to encounter.      Current Outpatient Prescriptions on File Prior to Encounter   Medication Sig Dispense Refill    cetirizine (ZYRTEC) 10 MG tablet Take 10 mg by mouth once daily.      lactulose (CHRONULAC) 10 gram/15 mL solution Take 45 ml by mouth four times daily  2    omeprazole (PRILOSEC) 40 MG capsule Take 40 mg by mouth once daily.      ondansetron (ZOFRAN-ODT) 4 MG TbDL Take 1 tablet (4 mg total) by mouth every 8 (eight) hours as needed (nausea and vomiting). 12 tablet 0    potassium chloride SA (K-DUR,KLOR-CON) 20 MEQ tablet Taking only when taking lasix 14 tablet 0    lisinopril (PRINIVIL,ZESTRIL) 5 MG  tablet Take 1 tablet by mouth only as needed for high blood pressure      spironolactone (ALDACTONE) 100 MG tablet Hold until follow-up with GI 14 tablet 0       Past Surgical History:   Procedure Laterality Date    BREAST SURGERY      CHOLECYSTECTOMY      HYSTERECTOMY         Social History     Social History    Marital status: Single     Spouse name: N/A    Number of children: N/A    Years of education: N/A     Occupational History    Not on file.     Social History Main Topics    Smoking status: Never Smoker    Smokeless tobacco: Never Used    Alcohol use Yes      Comment: Currently admitted to inpatient rehab    Drug use: No    Sexual activity: Not Currently     Other Topics Concern    Not on file     Social History Narrative    No narrative on file       OBJECTIVE:     Vital Signs Range (Last 24H):  Temp:  [36.7 °C (98.1 °F)-37.2 °C (98.9 °F)]   Pulse:  [83-88]   Resp:  [16-18]   BP: ()/(55-59)   SpO2:  [93 %-97 %]       CBC:   Recent Labs      11/29/17 0435 11/30/17 0357   WBC  7.29  6.83   RBC  2.99*  2.82*   HGB  8.9*  8.6*   HCT  24.4*  23.2*   PLT  65*  59*   MCV  82  82   MCH  29.8  30.5   MCHC  36.5*  37.1*       CMP:   Recent Labs      11/29/17 0435 11/29/17   1605  11/30/17 0357   NA  127*  126*  125*   K  3.5  3.8  3.5   CL  92*  91*  93*   CO2  25  25  23   BUN  10  10  9   CREATININE  0.8  0.8  0.7   GLU  89  117*  82   MG  2.1   --   1.8   PHOS  2.4*   --   2.2*   CALCIUM  9.1  9.2  8.8   ALBUMIN  3.6  3.8  3.6   PROT  5.4*  5.7*  5.3*   ALKPHOS  197*  205*  182*   ALT  175*  170*  144*   AST  177*  180*  142*   BILITOT  25.1*  26.0*  24.6*       INR:  Recent Labs      11/28/17   0340  11/29/17 0435 11/30/17 0357   INR  2.6*  2.8*  3.0*       Diagnostic Studies: No relevant studies.    EKG: No recent studies available.    2D ECHO:  No results found for this or any previous visit.      ASSESSMENT/PLAN:         Anesthesia Evaluation    I have reviewed the Patient  Summary Reports.     I have reviewed the Medications.   Prednisone    Review of Systems  Anesthesia Hx:  History of prior surgery of interest to airway management or planning:   Social:  Non-Smoker, Alcohol Use    Cardiovascular:   Hypertension    Pulmonary:  Pulmonary Normal    Hepatic/GI:   Liver Disease, ESLD decompensated   Neurological:   Acute encephalopathy   Psych:   ETOH abuse         Physical Exam  General:  Well nourished    Airway/Jaw/Neck:  Airway Findings: Mouth Opening: Normal Tongue: Normal  General Airway Assessment: Adult  Mallampati: II  TM Distance: Normal, at least 6 cm     Eyes/Ears/Nose:  Eyes/Ears/Nose Findings: - jaundiced     Dental:  DENTAL FINDINGS: Normal   Chest/Lungs:  Chest/Lungs Clear    Heart/Vascular:  Heart Findings: Normal    Abdomen:  Abdomen Findings:  Ascites       Mental Status:  Mental Status Findings: Normal        Anesthesia Plan  Type of Anesthesia, risks & benefits discussed:  Anesthesia Type:  general, MAC  Patient's Preference:   Intra-op Monitoring Plan: standard ASA monitors  Intra-op Monitoring Plan Comments:   Post Op Pain Control Plan: multimodal analgesia, per primary service following discharge from PACU and IV/PO Opioids PRN  Post Op Pain Control Plan Comments:   Induction:   IV  Beta Blocker:  Patient is not currently on a Beta-Blocker (No further documentation required).       Informed Consent: Patient understands risks and agrees with Anesthesia plan.  Questions answered. Anesthesia consent signed with patient.  ASA Score: 3     Day of Surgery Review of History & Physical:    H&P update referred to the provider.         Ready For Surgery From Anesthesia Perspective.

## 2017-11-30 NOTE — PROGRESS NOTES
[facility]  Hepatology  Progress Note    Patient Name: Marcie Bazan  MRN: [id number]  Admission Date: 11/23/2017  Hospital Length of Stay: 7 days  Attending Provider: Surinder Madrigal MD   Primary Care Physician: ODILIA Wall  Principal Problem:Decompensated hepatic cirrhosis    Subjective:     Transplant status: Pre-transplant    HPI: 52 year old female with PMHx of ESLD 2/2 EtOH hepatitis and essential HTN who presents to [facility] as a direct transfer from [facility] for evaluation of decompensated liver cirrhosis. Patient presented to [facility] with generalized weakness and was admitted to  11/10 for hyponatremia seen on labs in GI office. Discharged on 11/15 and returned on 11/20 with 1 week worsening weakness, malaise, lethargy, and decreased appetite. Denied fever/chills, abdominal pain, bright red or black tarry stools, hematemesis or other abnormal bleeding, confusion or disorientation, changes in urination, light-headedness, headache, changes in vision, dyspnea, chest pain or palpitations. Labs demonstrated Na 116 and MELD 32; patient admitted for hyponatremia. Nephrology consulted, recommended fluid restriction , holding diuretics, and prednisode taper that started 11/23 (decreased from 40mg QD to 20mg Qd.) Patient transferred to [facility] for revaluation of liver disease. Of note, paracentesis performed 11/22, removed 2L, negative for SBP.     Follows with Dr. Souza for cirrhosis. Since early 09/2017, patient's liver failure progressing indicated by worsening clinical signs (abdominal distention, weakness/fatigue) and MELD. She had been evaluated by [facility] liver transplant committee 10/25/2017 and declined transplant candidacy 2/2 continued EtOH use and lack of solid caregiver support/plan.She has since been enrolled in in-patient substance abuse program at [facility] where she has remained since early November; per patient, her last EtOH beverage was  "11/01/2017.  Per chart review on 11/22/17:     "Patient's case was discussed in liver selection committee today. Per committee discussion, the SW will call patient's daughter to discuss caregiver plan at this time. If the caregiver plan is adequate, patient will need to follow up with either/both addiction psych or SW regarding substance abuse and plan. May need to do this inpatient if patient is still admitted."        Denies history of GI bleed, SBP, renal disease, or other complications of cirrhosis. Liver biopsy 10/24 demonstrated stage 4 cirrhosis. Records reports EGD in August 2016 that showed small esophageal varices with severe gastritis. Colonoscopy 2015 with 2 polyps and internal hemorrhoids Started on prednisone 40mg QD on 10/25; was on spironolactone, has been held since 11/10 for hypoNa; on lactulose.    Interval History:   Pt more awake today oriented to person, place, situation. Expresses hunger today and desire for real food, will continue w/ Liver transplant evaluation pending EGD, Cscope, Mammogram     Current Facility-Administered Medications   Medication    albumin human 25% bottle 25 g    lactulose 20 gram/30 mL solution Soln 20 g    ondansetron disintegrating tablet 4 mg    pantoprazole EC tablet 40 mg    polyethylene glycol (GoLYTELY) solution    potassium, sodium phosphates 280-160-250 mg packet 1 packet    [START ON 12/2/2017] predniSONE tablet 10 mg    Followed by    [START ON 12/9/2017] predniSONE tablet 5 mg    predniSONE tablet 15 mg    rifAXIMin tablet 550 mg    sodium chloride 0.9% flush 3 mL       Objective:     Vital Signs (Most Recent):  Temp: 98.1 °F (36.7 °C) (11/30/17 1147)  Pulse: 86 (11/30/17 1147)  Resp: 16 (11/30/17 1147)  BP: (!) 103/59 (11/30/17 1147)  SpO2: 95 % (11/30/17 1147) Vital Signs (24h Range):  Temp:  [98.1 °F (36.7 °C)-98.9 °F (37.2 °C)] 98.1 °F (36.7 °C)  Pulse:  [83-88] 86  Resp:  [16-18] 16  SpO2:  [93 %-97 %] 95 %  BP: ()/(55-59) 103/59 "     Weight: 69.6 kg (153 lb 7 oz) (11/23/17 2147)  Body mass index is 25.53 kg/m².    Physical Exam   Constitutional:   Confused ; appears very frail   HENT:   Head: Normocephalic.   Eyes: Scleral icterus is present.   Cardiovascular: Normal rate.    Pulmonary/Chest: Effort normal.   Abdominal: Soft.   + fluid wave ; nontender   Neurological:    very slow to respond and difficult to arouse but oriented to person, place, time    Vitals reviewed.      MELD-Na score: 35 at 11/30/2017  3:57 AM  MELD score: 31 at 11/30/2017  3:57 AM  Calculated from:  Serum Creatinine: 0.7 mg/dL (Rounded to 1) at 11/30/2017  3:57 AM  Serum Sodium: 125 mmol/L at 11/30/2017  3:57 AM  Total Bilirubin: 24.6 mg/dL at 11/30/2017  3:57 AM  INR(ratio): 3.0 at 11/30/2017  3:57 AM  Age: 52 years    Significant Labs:  CBC:     Recent Labs  Lab 11/30/17 0357   WBC 6.83   RBC 2.82*   HGB 8.6*   HCT 23.2*   PLT 59*     CMP:     Recent Labs  Lab 11/30/17 0357   GLU 82   CALCIUM 8.8   ALBUMIN 3.6   PROT 5.3*   *   K 3.5   CO2 23   CL 93*   BUN 9   CREATININE 0.7   ALKPHOS 182*   *   *   BILITOT 24.6*     Coagulation:   Recent Labs  Lab 11/23/17  2335  11/30/17  0357   INR 2.2*  < > 3.0*   APTT 36.1*  --   --    < > = values in this interval not displayed.    Significant Imaging:  Labs: Reviewed    Review of Systems      Assessment/Plan:     * Decompensated hepatic cirrhosis      MELD-Na score: 35 at 11/30/2017  3:57 AM  MELD score: 31 at 11/30/2017  3:57 AM  Calculated from:  Serum Creatinine: 0.7 mg/dL (Rounded to 1) at 11/30/2017  3:57 AM  Serum Sodium: 125 mmol/L at 11/30/2017  3:57 AM  Total Bilirubin: 24.6 mg/dL at 11/30/2017  3:57 AM  INR(ratio): 3.0 at 11/30/2017  3:57 AM  Age: 52 years    Initially admitted to [facility] with decompensation. Has been attending inpatient etoh rehab.   Previously deferred due to lack of caregiver plan but this seems more stable now per our social work.      Plan:  - continue Prednisone Taper  -  Pt seen by addiction psych ruled moderate risk w/ available support system   - evaluated by social work  - continue lactulose and rifaximin, titrate to 3-4 BMs/day   - Recommend removal of Fecal Management System as pt is awake able to ambulate to bedside commode   - Bedside commode ordered   - C Diff (-)  - CMP and INR daily  - UCx + w/ >100,000 cfu/ml E Coli sensitive to rocephin, completed day 3 rocephin 11/27  - PETH test from 11/24 is Negative   - 0.8 L free water restriction in light of hyponatremia (please ensure enforcement of restriction)  - pt w/ results of + CMV PCR load, agree w/ ID recs, no evidence of end organ damage, monitor for now, will consider treatment down the line if transplant is pursued   - proceed w/ EGD, C-Scope, Mammogram to complete workup                        Thank you for your consult. I will follow-up with patient. Please contact us if you have any additional questions.    Magan Lazcano MD  Hepatology  [facility]

## 2017-11-30 NOTE — SUBJECTIVE & OBJECTIVE
Interval History: Patient has been approved to be on transplant list     Review of Systems   Constitutional: Negative for chills and fatigue.   Respiratory: Negative for cough, choking, chest tightness and shortness of breath.    Gastrointestinal: Negative for abdominal distention, abdominal pain, anal bleeding, nausea and vomiting.     Objective:     Vital Signs (Most Recent):  Temp: 97.7 °F (36.5 °C) (11/30/17 1525)  Pulse: 85 (11/30/17 1525)  Resp: 18 (11/30/17 1525)  BP: 106/62 (11/30/17 1525)  SpO2: 96 % (11/30/17 1525) Vital Signs (24h Range):  Temp:  [97.7 °F (36.5 °C)-98.9 °F (37.2 °C)] 97.7 °F (36.5 °C)  Pulse:  [83-88] 85  Resp:  [16-18] 18  SpO2:  [93 %-97 %] 96 %  BP: ()/(55-62) 106/62     Weight: 69.6 kg (153 lb 7 oz)  Body mass index is 25.53 kg/m².    Estimated Creatinine Clearance: 89.9 mL/min (based on SCr of 0.7 mg/dL).    Physical Exam   Constitutional: She is oriented to person, place, and time. She appears well-developed and well-nourished.   HENT:   Head: Normocephalic and atraumatic.   Eyes: EOM are normal. Pupils are equal, round, and reactive to light.   Neck: Normal range of motion.   Cardiovascular: Normal rate, regular rhythm and normal heart sounds.    Pulmonary/Chest: Effort normal and breath sounds normal.   Abdominal: Soft. Bowel sounds are normal. She exhibits distension. There is no tenderness. There is no rebound and no guarding.   Musculoskeletal: Normal range of motion. She exhibits no edema.   Neurological: She is alert and oriented to person, place, and time.   Skin: No rash noted.       Significant Labs:   Blood Culture:   Recent Labs  Lab 09/12/17  1614 10/19/17  1229 10/19/17  1343 11/22/17  1611 11/24/17  1058   LABBLOO No growth after 5 days. No growth after 5 days. No growth after 5 days. No growth after 5 days.  No growth after 5 days. No growth after 5 days.  No growth after 5 days.     BMP:   Recent Labs  Lab 11/30/17  0357   GLU 82   *   K 3.5   CL 93*    CO2 23   BUN 9   CREATININE 0.7   CALCIUM 8.8   MG 1.8     Bone Marrow Culture: No results for input(s): BONEMARROWCU in the last 4320 hours.  CMP:   Recent Labs  Lab 11/29/17  0435 11/29/17  1605 11/30/17  0357   * 126* 125*   K 3.5 3.8 3.5   CL 92* 91* 93*   CO2 25 25 23   GLU 89 117* 82   BUN 10 10 9   CREATININE 0.8 0.8 0.7   CALCIUM 9.1 9.2 8.8   PROT 5.4* 5.7* 5.3*   ALBUMIN 3.6 3.8 3.6   BILITOT 25.1* 26.0* 24.6*   ALKPHOS 197* 205* 182*   * 180* 142*   * 170* 144*   ANIONGAP 10 10 9   EGFRNONAA >60.0 >60.0 >60.0     Microbiology Results (last 7 days)     Procedure Component Value Units Date/Time    Blood culture [[id number]] Collected:  11/24/17 1058    Order Status:  Completed Specimen:  Blood Updated:  11/29/17 1412     Blood Culture, Routine No growth after 5 days.    Blood culture [[id number]] Collected:  11/24/17 1058    Order Status:  Completed Specimen:  Blood Updated:  11/29/17 1412     Blood Culture, Routine No growth after 5 days.    Urine culture [[id number]]  (Susceptibility) Collected:  11/24/17 1748    Order Status:  Completed Specimen:  Urine from Urine, Catheterized Updated:  11/27/17 0038     Urine Culture, Routine --     ESCHERICHIA COLI  >100,000 cfu/ml      Clostridium difficile EIA [[id number]] Collected:  11/26/17 1513    Order Status:  Completed Specimen:  Stool from Stool Updated:  11/26/17 2328     C. diff Antigen Negative     C difficile Toxins A+B, EIA Negative     Comment: Testing not recommended for children <24 months old.             Significant Imaging: I have reviewed all pertinent imaging results/findings within the past 24 hours.

## 2017-11-30 NOTE — PT/OT/SLP PROGRESS
Physical Therapy  Treatment    Marcie Bazan   MRN: [id number]   Admitting Diagnosis: Decompensated hepatic cirrhosis    PT Received On: 11/30/17  PT Start Time: 1253     PT Stop Time: 1316    PT Total Time (min): 23 min       Billable Minutes:  Gait [id number] min and Therapeutic Exercise 8 Min    Treatment Type: Treatment  PT/PTA: PT     PTA Visit Number: 0       General Precautions: Standard, fall  Orthopedic Precautions: N/A   Braces:      Do you have any cultural, spiritual, Sikh conflicts, given your current situation?: none stated    Subjective:  Communicated with nurse prior to session.  Patient agrees to walk in the hallway    Pain/Comfort  Pain Rating 1: 0/10    Objective:        Functional Mobility:  Bed Mobility:   Rolling/Turning to Left: Stand by assistance  Rolling/Turning Right: Stand by assistance  Scooting/Bridging: Stand by Assistance  Supine to Sit: Minimum Assistance  Sit to Supine:  (Patient left sitting up in chair)    Transfers:  Sit <> Stand Assistance: Contact Guard Assistance  Sit <> Stand Assistive Device: Rolling Walker  Bed <> Chair Technique: Stand Pivot  Bed <> Chair Assistance: Contact Guard Assistance  Bed <> Chair Assistive Device: Rolling Walker    Gait:   Gait Distance: 152'  Assistance 1: Contact Guard Assistance  Gait Assistive Device: Rolling walker  Gait Pattern: reciprocal  Gait Deviation(s): decreased lisbet, decreased step length, decreased weight-shifting ability, decreased stride length   -3 LOB and 1 Buckle    Balance:   Static Sit: GOOD: Takes MODERATE challenges from all directions  Dynamic Sit: GOOD: Maintains balance through MODERATE excursions of active trunk movement  Static Stand: FAIR: Maintains without assist but unable to take challenges  Dynamic stand: FAIR: Needs CONTACT GUARD during gait     Therapeutic Activities and Exercises:  Patient assisted w/ hallway ambulation  Patient educated on seated therex; performed 1x10   -Marching   -LAQ   -Ankle  Pumps   -Glute Sets     AM-PAC 6 CLICK MOBILITY  How much help from another person does this patient currently need?   1 = Unable, Total/Dependent Assistance  2 = A lot, Maximum/Moderate Assistance  3 = A little, Minimum/Contact Guard/Supervision  4 = None, Modified [place]/Independent    Turning over in bed (including adjusting bedclothes, sheets and blankets)?: 4  Sitting down on and standing up from a chair with arms (e.g., wheelchair, bedside commode, etc.): 3  Moving from lying on back to sitting on the side of the bed?: 4  Moving to and from a bed to a chair (including a wheelchair)?: 3  Need to walk in hospital room?: 3  Climbing 3-5 steps with a railing?: 2  Total Score: 19    AM-PAC Raw Score CMS G-Code Modifier Level of Impairment Assistance   6 % Total / Unable   7 - 9 CM 80 - 100% Maximal Assist   10 - 14 CL 60 - 80% Moderate Assist   15 - 19 CK 40 - 60% Moderate Assist   20 - 22 CJ 20 - 40% Minimal Assist   23 CI 1-20% SBA / CGA   24 CH 0% Independent/ Mod I     Patient left up in chair with all lines intact, call button in reach, nurse notified and pct present.    Assessment:  Marcie Bazan is a 52 y.o. female with a medical diagnosis of Decompensated hepatic cirrhosis and presents with weakness and gait instability. Patient shows improvements w/ bed mobility and gait distance, but is impaired by poor balance. Paitent had 3 LOB and 1 Knee Buckle during gait; all were self corrected. Patient will continue to benefit from skilled PT services to address listed impairments and to help return to of.    Rehab identified problem list/impairments: Rehab identified problem list/impairments: weakness, impaired endurance, impaired self care skills, impaired functional mobilty, gait instability, impaired balance    Rehab potential is good.    Activity tolerance: Good    Discharge recommendations: Discharge Facility/Level Of Care Needs: nursing facility, skilled     Barriers to discharge: Barriers  to Discharge: Decreased caregiver support    Equipment recommendations: Equipment Needed After Discharge: bedside commode, walker, rolling, shower chair     GOALS:    Physical Therapy Goals        Problem: Physical Therapy Goal    Goal Priority Disciplines Outcome Goal Variances Interventions   Physical Therapy Goal     PT/OT, PT Ongoing (interventions implemented as appropriate)     Description:  Goals to be met by: 17    Patient will increase functional independence with mobility by performin. Supine to sit with Set-up [place]  2. Sit to supine with Set-up [place]  3. Sit to stand transfer with Supervision  4. Bed to chair transfer with Supervision using Rolling Walker  5. Gait  x 100 feet with Stand-by Assistance using Rolling Walker.   6. Lower extremity exercise program x20 reps per handout, with independence                      PLAN:    Patient to be seen 4 x/week  to address the above listed problems via gait training, therapeutic activities, therapeutic exercises, neuromuscular re-education  Plan of Care expires: 17  Plan of Care reviewed with: patient         Khoi Melendrez, PT  2017

## 2017-11-30 NOTE — PROGRESS NOTES
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 ID consulted for Blood CMV PCR of 11/21/17 was 266, CMV IgM and IgG positive. Patient has no blurry vision, no GI symptoms, no CNS symptoms.    - CMV viremia is low, seems to be reactivation maybe secondary to acute decompensation of liver diease    - at this moment would not start therapy to avoid side effects of therapy and there are no signs of invasive disease   - would repeat CMV PCR blood today   - if there is plan for immediate liver transplant then would star induction therapy with Ganciclovir at time of transplant and not prophylactic therapy with valcyte to avoid resistance in patient that already has low grade viremia            Anticipated Disposition:     Thank you for your consult. I will sign off. Please contact us if you have any additional questions.    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 Patient presented to [facility] with generalized weakness since about two months ago, that have been progressively getting worse. She was admitted to  11/10/17 for hyponatremia seen on labs in GI office and was discharged on 11/15/17; readmitted on 11/20/17 with due to  worsening generalized weakness, malaise,  lethargy, and decreased appetite. Labs showed hyponatremia  of 116 and MELD 32.. Nephrology consulted, recommended fluid restriction , holding diuretics, and prednisode taper that started 11/23 (decreased from 40mg QD to 20mg Qd.) Patient transferred to [facility] for revaluation of liver disease. Of note, paracentesis performed 11/22/17, (total wbc 78) removed 2L.     ID consulted for Blood CMV PCR of 266, CMV IgM and IgG positive. Patient has no blurry vision, no GI symptoms, no CNS symptoms.  Interval History: Patient has been approved to be on transplant list     Review of Systems   Constitutional: Negative for chills and fatigue.   Respiratory: Negative for cough, choking, chest tightness and shortness of breath.    Gastrointestinal: Negative for abdominal distention, abdominal pain, anal bleeding, nausea and vomiting.     Objective:     Vital Signs (Most Recent):  Temp: 97.7 °F (36.5 °C) (11/30/17 1525)  Pulse: 85 (11/30/17 1525)  Resp: 18 (11/30/17 1525)  BP: 106/62 (11/30/17 1525)  SpO2: 96 % (11/30/17 1525) Vital Signs (24h Range):  Temp:  [97.7 °F (36.5 °C)-98.9 °F (37.2 °C)] 97.7 °F (36.5 °C)  Pulse:  [83-88] 85  Resp:  [16-18] 18  SpO2:  [93 %-97 %] 96 %  BP: ()/(55-62) 106/62     Weight: 69.6 kg (153 lb 7 oz)  Body mass index is 25.53 kg/m².    Estimated Creatinine Clearance: 89.9 mL/min (based on SCr of 0.7 mg/dL).    Physical Exam   Constitutional: She is oriented to person, place, and time. She appears well-developed and well-nourished.   HENT:   Head: Normocephalic and atraumatic.   Eyes: EOM are normal. Pupils are equal, round, and reactive to light.   Neck: Normal range of motion.   Cardiovascular: Normal rate, regular rhythm and normal heart sounds.    Pulmonary/Chest: Effort normal and breath sounds normal.   Abdominal: Soft. Bowel sounds are normal. She exhibits distension. There is no tenderness. There is no rebound and no guarding.   Musculoskeletal: Normal range of motion. She  exhibits no edema.   Neurological: She is alert and oriented to person, place, and time.   Skin: No rash noted.       Significant Labs:   Blood Culture:   Recent Labs  Lab 09/12/17  1614 10/19/17  1229 10/19/17  1343 11/22/17  1611 11/24/17  1058   LABBLOO No growth after 5 days. No growth after 5 days. No growth after 5 days. No growth after 5 days.  No growth after 5 days. No growth after 5 days.  No growth after 5 days.     BMP:   Recent Labs  Lab 11/30/17  0357   GLU 82   *   K 3.5   CL 93*   CO2 23   BUN 9   CREATININE 0.7   CALCIUM 8.8   MG 1.8     Bone Marrow Culture: No results for input(s): BONEMARROWCU in the last 4320 hours.  CMP:   Recent Labs  Lab 11/29/17  0435 11/29/17  1605 11/30/17  0357   * 126* 125*   K 3.5 3.8 3.5   CL 92* 91* 93*   CO2 25 25 23   GLU 89 117* 82   BUN 10 10 9   CREATININE 0.8 0.8 0.7   CALCIUM 9.1 9.2 8.8   PROT 5.4* 5.7* 5.3*   ALBUMIN 3.6 3.8 3.6   BILITOT 25.1* 26.0* 24.6*   ALKPHOS 197* 205* 182*   * 180* 142*   * 170* 144*   ANIONGAP 10 10 9   EGFRNONAA >60.0 >60.0 >60.0     Microbiology Results (last 7 days)     Procedure Component Value Units Date/Time    Blood culture [[id number]] Collected:  11/24/17 1058    Order Status:  Completed Specimen:  Blood Updated:  11/29/17 1412     Blood Culture, Routine No growth after 5 days.    Blood culture [[id number]] Collected:  11/24/17 1058    Order Status:  Completed Specimen:  Blood Updated:  11/29/17 1412     Blood Culture, Routine No growth after 5 days.    Urine culture [[id number]]  (Susceptibility) Collected:  11/24/17 1748    Order Status:  Completed Specimen:  Urine from Urine, Catheterized Updated:  11/27/17 0038     Urine Culture, Routine --     ESCHERICHIA COLI  >100,000 cfu/ml      Clostridium difficile EIA [[id number]] Collected:  11/26/17 1513    Order Status:  Completed Specimen:  Stool from Stool Updated:  11/26/17 8630     C. diff Antigen Negative     C difficile Toxins A+B, EIA Negative      Comment: Testing not recommended for children <24 months old.             Significant Imaging: I have reviewed all pertinent imaging results/findings within the past 24 hours.

## 2017-11-30 NOTE — PLAN OF CARE
Problem: Fall Risk (Adult)  Goal: Absence of Falls  Patient will demonstrate the desired outcomes by discharge/transition of care.   Outcome: Ongoing (interventions implemented as appropriate)  Absence of falls noted at present. SR up x's3. Bed alarm on at all times. Fall risk protocol IP. Pt reminded not to get OOB. Verbalized understanding. Door ajar at all times. Call bell within reach. Will cont to monitor.

## 2017-11-30 NOTE — PROGRESS NOTES
[facility] Hospital Medicine  Progress Note    Patient Name: Marcie Bazan  MRN: [id number]  Patient Class: IP- Inpatient   Admission Date: 11/23/2017  Length of Stay: 6 days  Expected Discharge Date: 12/4/2017  Attending Physician: Surinder Madrigal MD  Primary Care Provider: Milton Ferrer, [facility] Medicine Team: [facility]OSP MED A Surinder Madrigal MD  Principal Problem:Decompensated hepatic cirrhosis    Subjective:     HPI:   52 year old female with PMHx of 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 Patient presented to [facility] with generalized weakness. Onset two months ago, progressively worsening. Was admitted to  11/10 for hyponatremia seen on labs in GI office. Discharged on 11/15 and returned on 11/20 with 1 week worsening weakness, malaise, lethargy, and decreased appetite. Denied fever/chills, abdominal pain, bright red or black tarry stools, hematemesis or other abnormal bleeding, confusion or disorientation, changes in urination, light-headedness, headache, changes in vision, dyspnea, chest pain or palpitations. Labs demonstrated Na 116 and MELD 32; patient admitted for hyponatremia. Nephrology consulted, recommended fluid restriction , holding diuretics, and prednisode taper that started 11/23 (decreased from 40mg QD to 20mg Qd.) Patient transferred to [facility] for revaluation of liver disease. Of note, paracentesis performed 11/22, removed 2L, negative for SBP.    Follows with Dr. Souza for cirrhosis. Since early 09/2017, patient's liver failure progressing indicated by worsening clinical signs (abdominal distention, weakness/fatigue) and MELD. In mid-October, patient's PETH demonstrated EtOH use and she was denied candidacy for liver transplant. She was then enrolled in in-patient substance abuse program at [facility]  where she has remained since early November; per patient, her last EtOH beverage was 11/01/2017. Denies history of GI bleed, SBP, renal disease, or other complications of cirrhosis. Liver biopsy 10/24 demonstrated stage 4 cirrhosis. Records reports EGD in August 2016 that showed small esophageal varices with severe gastritis. Colonoscopy 2015 with 2 polyps and internal hemorrhoids Started on prednisone 40mg QD on 10/25; was on spironolactone, has been held since 11/10 for hypoNa; on lactulose.    Hospital Course:  Mental status improving with hepatic encephalopathy treatment. Started empiric ceftriaxone given leukocytosis in immunocompromised state. CMV PCR positive overnight, for which ID was consulted, recommended repeating titers in 1 week. Seen by Psychiatry and  who determined she was moderate risk for Liver transplant. Will proceed with EGD/Cscope and mammogram.     Interval History: Patient considered moderate risk for relapse of alcohol. Will initiate liver transplant evaluation.     Review of Systems   Constitutional: Negative for chills and fever.   HENT: Negative for rhinorrhea and sore throat.    Eyes: Negative for pain and discharge.   Respiratory: Negative for cough and shortness of breath.    Cardiovascular: Negative for chest pain and leg swelling.   Gastrointestinal: Positive for abdominal distention and diarrhea. Negative for abdominal pain, blood in stool, nausea and vomiting.   Endocrine: Negative for cold intolerance and heat intolerance.   Genitourinary: Negative for dysuria and flank pain.   Neurological: Negative for dizziness and numbness.   Psychiatric/Behavioral: Positive for confusion. Negative for behavioral problems.     Objective:     Vital Signs (Most Recent):  Temp: 98.6 °F (37 °C) (11/29/17 1121)  Pulse: 94 (11/29/17 1121)  Resp: 20 (11/29/17 1121)  BP: (!) 114/57 (11/29/17 1121)  SpO2: (!) 93 % (11/29/17 1121) Vital Signs (24h Range):  Temp:  [98.1 °F (36.7 °C)-99.1 °F  (37.3 °C)] 98.6 °F (37 °C)  Pulse:  [86-94] 94  Resp:  [16-20] 20  SpO2:  [93 %-99 %] 93 %  BP: (108-117)/(57-66) 114/57     Weight: 69.6 kg (153 lb 7 oz)  Body mass index is 25.53 kg/m².    Intake/Output Summary (Last 24 hours) at 11/29/17 1907  Last data filed at 11/29/17 0600   Gross per 24 hour   Intake              320 ml   Output              400 ml   Net              -80 ml      Physical Exam   Constitutional: She appears well-developed and well-nourished.   HENT:   Head: Normocephalic and atraumatic.   Eyes: Conjunctivae are normal. Pupils are equal, round, and reactive to light. Scleral icterus is present.   Neck: Normal range of motion. No thyromegaly present.   Cardiovascular: Normal rate, regular rhythm and normal heart sounds.    Pulmonary/Chest: Effort normal and breath sounds normal.   Abdominal: Soft. She exhibits distension. There is no tenderness.   Musculoskeletal: Normal range of motion. She exhibits no edema.   Neurological: She is alert.   AAO times 2   Skin: No erythema. No pallor.       MELD-Na score: 34 at 11/29/2017  4:05 PM  MELD score: 30 at 11/29/2017  4:05 PM  Calculated from:  Serum Creatinine: 0.8 mg/dL (Rounded to 1) at 11/29/2017  4:05 PM  Serum Sodium: 126 mmol/L at 11/29/2017  4:05 PM  Total Bilirubin: 26.0 mg/dL at 11/29/2017  4:05 PM  INR(ratio): 2.8 at 11/29/2017  4:35 AM  Age: 52 years    Significant Labs:  CBC:    Recent Labs  Lab 11/28/17  0340 11/29/17  0435   WBC 7.57 7.29   HGB 9.2* 8.9*   HCT 24.6* 24.4*   PLT 69* 65*     CMP:    Recent Labs  Lab 11/28/17  1520 11/29/17  0435 11/29/17  1605   * 127* 126*   K 3.8 3.5 3.8   CL 91* 92* 91*   CO2 24 25 25   * 89 117*   BUN 12 10 10   CREATININE 0.8 0.8 0.8   CALCIUM 8.8 9.1 9.2   PROT 5.5* 5.4* 5.7*   ALBUMIN 3.5 3.6 3.8   BILITOT 27.2* 25.1* 26.0*   ALKPHOS 222* 197* 205*   * 177* 180*   * 175* 170*   ANIONGAP 8 10 10   EGFRNONAA >60.0 >60.0 >60.0     PTINR:    Recent Labs  Lab 11/28/17  0341  11/29/17  0435   INR 2.6* 2.8*       Significant Procedures:   Dobutamine Stress Test with Color Flow:   Results for orders placed or performed during the hospital encounter of 10/19/17   Dobutamine Stress Test w/ Color Flow   Result Value Ref Range    EF 73 55 - 65    Diastolic Dysfunction No     Est. PA Systolic Pressure 21.32     Tricuspid Valve Regurgitation TRIVIAL          Assessment / Plan:     * Decompensated hepatic cirrhosis    MELD-Na score: 34 at 11/29/2017  4:05 PM  MELD score: 30 at 11/29/2017  4:05 PM  Calculated from:  Serum Creatinine: 0.8 mg/dL (Rounded to 1) at 11/29/2017  4:05 PM  Serum Sodium: 126 mmol/L at 11/29/2017  4:05 PM  Total Bilirubin: 26.0 mg/dL at 11/29/2017  4:05 PM  INR(ratio): 2.8 at 11/29/2017  4:35 AM  Age: 52 years   - will ask for clearance for inpatient liver tx evaluation tomorrow  - had half of her evaluation completed on last admission  - AS per Psychiatry patient is moderate risk, will proceed with Liver transplant evaluation        Immunosuppressed status    On chronic prednisone for alcoholic hepatitis; currently 15 mg, would go down by 5 weekly, next change would be 10 mg this saturday          Coagulopathy    - vitamin k for 3 days          Alcoholic cirrhosis of liver with ascites    - see decompensated cirrhosis           Cytomegalovirus (CMV) viremia    -- ID evaluated and do not feel it needs to be treated        Thrombocytopenia    -- monitor           Abnormal liver enzymes    -- Stable  -- Etiology liver failure; see that section for more details          Alcoholic hepatitis with ascites- decompensated with elevated MELD    - cont tapering prednisone, currently at 15 mg        Hyponatremia    - on 800 cc fluid restriction; on albumin  - 116-->118-->120-->122-->123-->126        End stage liver disease              HCC (hepatocellular carcinoma)    AFP 9; seen on CT on 10/19, 1 cm focus, monitor for now          Hepatic encephalopathy    -- Improving, though not  yet returned to baseline, AAO times 3; put out 1 L in flexi-seal  -- Continue lactulose and rifaximin          Severe protein-calorie malnutrition    PAB, boost and dietary consult          Ascites    -- Holding diuretics at present, s/p 2L removed on 11/22 with testing negative for SBP  -- No abdominal pain today              Essential hypertension    - Stable  - Holding home lisinopril in setting of decompensated liver disease          Anemia of chronic disease    - monitor daily                VTE Risk Mitigation         Ordered     Place sequential compression device  Until discontinued      11/24/17 0734     Place FABIOLA hose  Until discontinued      11/23/17 2305     Medium Risk of VTE  Once      11/23/17 2305           Discharge Disposition: pending Liver transplant evaluation and presentation    Time taken to evaluate, plan, and coordinate patient care: 35 minutes.    Surinder Madrigal MD  Department of Hospital Medicine  [facility]

## 2017-11-30 NOTE — SUBJECTIVE & OBJECTIVE
Interval History:   Pt more awake today oriented to person, place, situation. Expresses hunger today and desire for real food, will continue w/ Liver transplant evaluation pending EGD, Cscope, Mammogram     Current Facility-Administered Medications   Medication    albumin human 25% bottle 25 g    lactulose 20 gram/30 mL solution Soln 20 g    ondansetron disintegrating tablet 4 mg    pantoprazole EC tablet 40 mg    polyethylene glycol (GoLYTELY) solution    potassium, sodium phosphates 280-160-250 mg packet 1 packet    [START ON 12/2/2017] predniSONE tablet 10 mg    Followed by    [START ON 12/9/2017] predniSONE tablet 5 mg    predniSONE tablet 15 mg    rifAXIMin tablet 550 mg    sodium chloride 0.9% flush 3 mL       Objective:     Vital Signs (Most Recent):  Temp: 98.1 °F (36.7 °C) (11/30/17 1147)  Pulse: 86 (11/30/17 1147)  Resp: 16 (11/30/17 1147)  BP: (!) 103/59 (11/30/17 1147)  SpO2: 95 % (11/30/17 1147) Vital Signs (24h Range):  Temp:  [98.1 °F (36.7 °C)-98.9 °F (37.2 °C)] 98.1 °F (36.7 °C)  Pulse:  [83-88] 86  Resp:  [16-18] 16  SpO2:  [93 %-97 %] 95 %  BP: ()/(55-59) 103/59     Weight: 69.6 kg (153 lb 7 oz) (11/23/17 2147)  Body mass index is 25.53 kg/m².    Physical Exam   Constitutional:   Confused ; appears very frail   HENT:   Head: Normocephalic.   Eyes: Scleral icterus is present.   Cardiovascular: Normal rate.    Pulmonary/Chest: Effort normal.   Abdominal: Soft.   + fluid wave ; nontender   Neurological:    very slow to respond and difficult to arouse but oriented to person, place, time    Vitals reviewed.      MELD-Na score: 35 at 11/30/2017  3:57 AM  MELD score: 31 at 11/30/2017  3:57 AM  Calculated from:  Serum Creatinine: 0.7 mg/dL (Rounded to 1) at 11/30/2017  3:57 AM  Serum Sodium: 125 mmol/L at 11/30/2017  3:57 AM  Total Bilirubin: 24.6 mg/dL at 11/30/2017  3:57 AM  INR(ratio): 3.0 at 11/30/2017  3:57 AM  Age: 52 years    Significant Labs:  CBC:     Recent Labs  Lab  11/30/17 0357   WBC 6.83   RBC 2.82*   HGB 8.6*   HCT 23.2*   PLT 59*     CMP:     Recent Labs  Lab 11/30/17 0357   GLU 82   CALCIUM 8.8   ALBUMIN 3.6   PROT 5.3*   *   K 3.5   CO2 23   CL 93*   BUN 9   CREATININE 0.7   ALKPHOS 182*   *   *   BILITOT 24.6*     Coagulation:   Recent Labs  Lab 11/23/17  5295  11/30/17 0357   INR 2.2*  < > 3.0*   APTT 36.1*  --   --    < > = values in this interval not displayed.    Significant Imaging:  Labs: Reviewed    Review of Systems

## 2017-12-01 ENCOUNTER — ANESTHESIA (OUTPATIENT)
Dept: ENDOSCOPY | Facility: HOSPITAL | Age: 52
DRG: 005 | End: 2017-12-01
Payer: COMMERCIAL

## 2017-12-01 ENCOUNTER — SURGERY (OUTPATIENT)
Age: 52
End: 2017-12-01

## 2017-12-01 LAB
ALBUMIN SERPL BCP-MCNC: 3.7 G/DL
ALP SERPL-CCNC: 151 U/L
ALT SERPL W/O P-5'-P-CCNC: 108 U/L
ANION GAP SERPL CALC-SCNC: 12 MMOL/L
AST SERPL-CCNC: 100 U/L
BASOPHILS # BLD AUTO: 0.01 K/UL
BASOPHILS NFR BLD: 0.2 %
BILIRUB SERPL-MCNC: 21.9 MG/DL
BLD PROD TYP BPU: NORMAL
BLOOD UNIT EXPIRATION DATE: NORMAL
BLOOD UNIT TYPE CODE: 600
BLOOD UNIT TYPE CODE: 6200
BLOOD UNIT TYPE CODE: 6200
BLOOD UNIT TYPE: NORMAL
BUN SERPL-MCNC: 8 MG/DL
CALCIUM SERPL-MCNC: 9.1 MG/DL
CHLORIDE SERPL-SCNC: 94 MMOL/L
CMV DNA SERPL NAA+PROBE-ACNC: 501 IU/ML
CO2 SERPL-SCNC: 25 MMOL/L
CODING SYSTEM: NORMAL
CREAT SERPL-MCNC: 0.7 MG/DL
DIFFERENTIAL METHOD: ABNORMAL
DISPENSE STATUS: NORMAL
EOSINOPHIL # BLD AUTO: 0 K/UL
EOSINOPHIL NFR BLD: 0.5 %
ERYTHROCYTE [DISTWIDTH] IN BLOOD BY AUTOMATED COUNT: 24.1 %
EST. GFR  (AFRICAN AMERICAN): >60 ML/MIN/1.73 M^2
EST. GFR  (NON AFRICAN AMERICAN): >60 ML/MIN/1.73 M^2
GLUCOSE SERPL-MCNC: 80 MG/DL
HCT VFR BLD AUTO: 22.2 %
HGB BLD-MCNC: 8 G/DL
IMM GRANULOCYTES # BLD AUTO: 0.04 K/UL
IMM GRANULOCYTES NFR BLD AUTO: 0.7 %
INR PPP: 2.4
LYMPHOCYTES # BLD AUTO: 1.8 K/UL
LYMPHOCYTES NFR BLD: 29.5 %
MAGNESIUM SERPL-MCNC: 1.8 MG/DL
MCH RBC QN AUTO: 30.1 PG
MCHC RBC AUTO-ENTMCNC: 36 G/DL
MCV RBC AUTO: 84 FL
MONOCYTES # BLD AUTO: 0.5 K/UL
MONOCYTES NFR BLD: 8.3 %
NEUTROPHILS # BLD AUTO: 3.7 K/UL
NEUTROPHILS NFR BLD: 60.8 %
NRBC BLD-RTO: 0 /100 WBC
NUM UNITS TRANS FFP: NORMAL
PHOSPHATE SERPL-MCNC: 1.9 MG/DL
PLATELET # BLD AUTO: 49 K/UL
PMV BLD AUTO: 10.4 FL
POTASSIUM SERPL-SCNC: 3.2 MMOL/L
PROT SERPL-MCNC: 5.4 G/DL
PROTHROMBIN TIME: 23.8 SEC
RBC # BLD AUTO: 2.66 M/UL
SODIUM SERPL-SCNC: 131 MMOL/L
WBC # BLD AUTO: 6.01 K/UL

## 2017-12-01 PROCEDURE — 06L38CZ OCCLUSION OF ESOPHAGEAL VEIN WITH EXTRALUMINAL DEVICE, VIA NATURAL OR ARTIFICIAL OPENING ENDOSCOPIC: ICD-10-PCS | Performed by: INTERNAL MEDICINE

## 2017-12-01 PROCEDURE — 63600175 PHARM REV CODE 636 W HCPCS: Performed by: HOSPITALIST

## 2017-12-01 PROCEDURE — 80053 COMPREHEN METABOLIC PANEL: CPT

## 2017-12-01 PROCEDURE — 27201022: Performed by: INTERNAL MEDICINE

## 2017-12-01 PROCEDURE — 25000003 PHARM REV CODE 250: Performed by: HOSPITALIST

## 2017-12-01 PROCEDURE — 84100 ASSAY OF PHOSPHORUS: CPT

## 2017-12-01 PROCEDURE — D9220A PRA ANESTHESIA: Mod: NTX,,, | Performed by: ANESTHESIOLOGY

## 2017-12-01 PROCEDURE — 0DB98ZX EXCISION OF DUODENUM, VIA NATURAL OR ARTIFICIAL OPENING ENDOSCOPIC, DIAGNOSTIC: ICD-10-PCS | Performed by: INTERNAL MEDICINE

## 2017-12-01 PROCEDURE — 43239 EGD BIOPSY SINGLE/MULTIPLE: CPT | Performed by: INTERNAL MEDICINE

## 2017-12-01 PROCEDURE — 25000003 PHARM REV CODE 250: Performed by: STUDENT IN AN ORGANIZED HEALTH CARE EDUCATION/TRAINING PROGRAM

## 2017-12-01 PROCEDURE — 20600001 HC STEP DOWN PRIVATE ROOM

## 2017-12-01 PROCEDURE — 88305 TISSUE EXAM BY PATHOLOGIST: CPT | Performed by: PATHOLOGY

## 2017-12-01 PROCEDURE — 63600175 PHARM REV CODE 636 W HCPCS: Mod: NTX | Performed by: NURSE ANESTHETIST, CERTIFIED REGISTERED

## 2017-12-01 PROCEDURE — P9017 PLASMA 1 DONOR FRZ W/IN 8 HR: HCPCS

## 2017-12-01 PROCEDURE — 45378 DIAGNOSTIC COLONOSCOPY: CPT | Performed by: INTERNAL MEDICINE

## 2017-12-01 PROCEDURE — 27201012 HC FORCEPS, HOT/COLD, DISP: Performed by: INTERNAL MEDICINE

## 2017-12-01 PROCEDURE — P9047 ALBUMIN (HUMAN), 25%, 50ML: HCPCS | Performed by: HOSPITALIST

## 2017-12-01 PROCEDURE — 37000008 HC ANESTHESIA 1ST 15 MINUTES: Performed by: INTERNAL MEDICINE

## 2017-12-01 PROCEDURE — 43244 EGD VARICES LIGATION: CPT | Performed by: INTERNAL MEDICINE

## 2017-12-01 PROCEDURE — 37000009 HC ANESTHESIA EA ADD 15 MINS: Performed by: INTERNAL MEDICINE

## 2017-12-01 PROCEDURE — 83735 ASSAY OF MAGNESIUM: CPT

## 2017-12-01 PROCEDURE — 45378 DIAGNOSTIC COLONOSCOPY: CPT | Mod: 53,,, | Performed by: INTERNAL MEDICINE

## 2017-12-01 PROCEDURE — 88305 TISSUE EXAM BY PATHOLOGIST: CPT | Mod: 26,,, | Performed by: PATHOLOGY

## 2017-12-01 PROCEDURE — 36415 COLL VENOUS BLD VENIPUNCTURE: CPT

## 2017-12-01 PROCEDURE — 99233 SBSQ HOSP IP/OBS HIGH 50: CPT | Mod: ,,, | Performed by: HOSPITALIST

## 2017-12-01 PROCEDURE — 43239 EGD BIOPSY SINGLE/MULTIPLE: CPT | Mod: 59,,, | Performed by: INTERNAL MEDICINE

## 2017-12-01 PROCEDURE — 85610 PROTHROMBIN TIME: CPT

## 2017-12-01 PROCEDURE — 43244 EGD VARICES LIGATION: CPT | Mod: 51,,, | Performed by: INTERNAL MEDICINE

## 2017-12-01 PROCEDURE — 0DJD8ZZ INSPECTION OF LOWER INTESTINAL TRACT, VIA NATURAL OR ARTIFICIAL OPENING ENDOSCOPIC: ICD-10-PCS | Performed by: INTERNAL MEDICINE

## 2017-12-01 PROCEDURE — 25000003 PHARM REV CODE 250: Mod: NTX | Performed by: NURSE ANESTHETIST, CERTIFIED REGISTERED

## 2017-12-01 PROCEDURE — 36430 TRANSFUSION BLD/BLD COMPNT: CPT

## 2017-12-01 PROCEDURE — 85025 COMPLETE CBC W/AUTO DIFF WBC: CPT

## 2017-12-01 RX ORDER — SODIUM CHLORIDE 0.9 % (FLUSH) 0.9 %
3 SYRINGE (ML) INJECTION
Status: DISCONTINUED | OUTPATIENT
Start: 2017-12-01 | End: 2017-12-26

## 2017-12-01 RX ORDER — PHENYLEPHRINE HYDROCHLORIDE 10 MG/ML
INJECTION INTRAVENOUS
Status: DISCONTINUED | OUTPATIENT
Start: 2017-12-01 | End: 2017-12-01

## 2017-12-01 RX ORDER — PROPOFOL 10 MG/ML
VIAL (ML) INTRAVENOUS
Status: DISCONTINUED | OUTPATIENT
Start: 2017-12-01 | End: 2017-12-01

## 2017-12-01 RX ORDER — SODIUM,POTASSIUM PHOSPHATES 280-250MG
2 POWDER IN PACKET (EA) ORAL
Status: DISPENSED | OUTPATIENT
Start: 2017-12-01 | End: 2017-12-02

## 2017-12-01 RX ORDER — SODIUM CHLORIDE 9 MG/ML
INJECTION, SOLUTION INTRAVENOUS CONTINUOUS PRN
Status: DISCONTINUED | OUTPATIENT
Start: 2017-12-01 | End: 2017-12-01

## 2017-12-01 RX ORDER — LIDOCAINE HCL/PF 100 MG/5ML
SYRINGE (ML) INTRAVENOUS
Status: DISCONTINUED | OUTPATIENT
Start: 2017-12-01 | End: 2017-12-01

## 2017-12-01 RX ORDER — PROPOFOL 10 MG/ML
VIAL (ML) INTRAVENOUS CONTINUOUS PRN
Status: DISCONTINUED | OUTPATIENT
Start: 2017-12-01 | End: 2017-12-01

## 2017-12-01 RX ADMIN — RIFAXIMIN 550 MG: 550 TABLET ORAL at 09:12

## 2017-12-01 RX ADMIN — ALBUMIN (HUMAN) 25 G: 12.5 SOLUTION INTRAVENOUS at 05:12

## 2017-12-01 RX ADMIN — PANTOPRAZOLE SODIUM 40 MG: 40 TABLET, DELAYED RELEASE ORAL at 11:12

## 2017-12-01 RX ADMIN — PREDNISONE 15 MG: 5 TABLET ORAL at 11:12

## 2017-12-01 RX ADMIN — ALBUMIN (HUMAN) 25 G: 12.5 SOLUTION INTRAVENOUS at 02:12

## 2017-12-01 RX ADMIN — PHENYLEPHRINE HYDROCHLORIDE 200 MCG: 10 INJECTION INTRAVENOUS at 10:12

## 2017-12-01 RX ADMIN — RIFAXIMIN 550 MG: 550 TABLET ORAL at 11:12

## 2017-12-01 RX ADMIN — SODIUM CHLORIDE: 0.9 INJECTION, SOLUTION INTRAVENOUS at 09:12

## 2017-12-01 RX ADMIN — PROPOFOL 30 MG: 10 INJECTION, EMULSION INTRAVENOUS at 10:12

## 2017-12-01 RX ADMIN — ALBUMIN (HUMAN) 25 G: 12.5 SOLUTION INTRAVENOUS at 10:12

## 2017-12-01 RX ADMIN — PROPOFOL 70 MG: 10 INJECTION, EMULSION INTRAVENOUS at 10:12

## 2017-12-01 RX ADMIN — LACTULOSE 20 G: 20 SOLUTION ORAL at 10:12

## 2017-12-01 RX ADMIN — LACTULOSE 20 G: 20 SOLUTION ORAL at 02:12

## 2017-12-01 RX ADMIN — POTASSIUM & SODIUM PHOSPHATES POWDER PACK 280-160-250 MG 2 PACKET: 280-160-250 PACK at 09:12

## 2017-12-01 RX ADMIN — PHENYLEPHRINE HYDROCHLORIDE 100 MCG: 10 INJECTION INTRAVENOUS at 10:12

## 2017-12-01 RX ADMIN — LIDOCAINE HYDROCHLORIDE 100 MG: 20 INJECTION, SOLUTION INTRAVENOUS at 10:12

## 2017-12-01 RX ADMIN — PROPOFOL 200 MCG/KG/MIN: 10 INJECTION, EMULSION INTRAVENOUS at 10:12

## 2017-12-01 NOTE — ASSESSMENT & PLAN NOTE
MELD-Na score: 30 at 12/1/2017  4:08 AM  MELD score: 28 at 12/1/2017  4:08 AM  Calculated from:  Serum Creatinine: 0.7 mg/dL (Rounded to 1) at 12/1/2017  4:08 AM  Serum Sodium: 131 mmol/L at 12/1/2017  4:08 AM  Total Bilirubin: 21.9 mg/dL at 12/1/2017  4:08 AM  INR(ratio): 2.4 at 12/1/2017  4:08 AM  Age: 52 years   - will ask for clearance for inpatient liver tx evaluation tomorrow  - had half of her evaluation completed on last admission  - AS per Psychiatry patient is moderate risk, will proceed with Liver transplant evaluation

## 2017-12-01 NOTE — TRANSFER OF CARE
"Anesthesia Transfer of Care Note    Patient: Marcie Bazan    Procedure(s) Performed: Procedure(s) (LRB):  ESOPHAGOGASTRODUODENOSCOPY (EGD) (N/A)  COLONOSCOPY (N/A)    Patient location: Other: 2nd Floor Endo Slot C    Anesthesia Type: general    Transport from OR: Transported from OR on room air with adequate spontaneous ventilation    Post pain: adequate analgesia    Post assessment: no apparent anesthetic complications and tolerated procedure well    Post vital signs: stable    Level of consciousness: awake    Nausea/Vomiting: no nausea/vomiting    Complications: none    Transfer of care protocol was followed      Last vitals:   Visit Vitals  /63   Pulse 89   Temp 36.8 °C (98.3 °F)   Resp 18   Ht 5' 5" (1.651 m)   Wt 69.6 kg (153 lb 7 oz)   LMP  (LMP Unknown)   SpO2 97%   Breastfeeding? No   BMI 25.53 kg/m²     "

## 2017-12-01 NOTE — H&P (VIEW-ONLY)
[facility]  Hepatology  Consult Note    Patient Name: Marcie Bazan  MRN: [id number]  Admission Date: 11/23/2017  Hospital Length of Stay: 1 days  Attending Provider: Chandra Campbell, *   Primary Care Physician: ODILIA Wall  Principal Problem:Decompensated hepatic cirrhosis    Inpatient consult to Hepatology  Consult performed by: NEGRITA SANTOYO  Consult ordered by: RAH GRANADOS        Subjective:     Transplant status: Pre-transplant    HPI:  52 year old female with PMHx of ESLD 2/2 EtOH hepatitis and essential HTN who presents to [facility] as a direct transfer from [facility] for evaluation of decompensated liver cirrhosis. Patient presented to [facility] with generalized weakness and was admitted to  11/10 for hyponatremia seen on labs in GI office. Discharged on 11/15 and returned on 11/20 with 1 week worsening weakness, malaise, lethargy, and decreased appetite. Denied fever/chills, abdominal pain, bright red or black tarry stools, hematemesis or other abnormal bleeding, confusion or disorientation, changes in urination, light-headedness, headache, changes in vision, dyspnea, chest pain or palpitations. Labs demonstrated Na 116 and MELD 32; patient admitted for hyponatremia. Nephrology consulted, recommended fluid restriction , holding diuretics, and prednisode taper that started 11/23 (decreased from 40mg QD to 20mg Qd.) Patient transferred to [facility] for revaluation of liver disease. Of note, paracentesis performed 11/22, removed 2L, negative for SBP.     Follows with Dr. Souza for cirrhosis. Since early 09/2017, patient's liver failure progressing indicated by worsening clinical signs (abdominal distention, weakness/fatigue) and MELD. She had been evaluated by [facility] liver transplant committee 10/25/2017 and declined transplant candidacy 2/2 continued EtOH use and lack of solid caregiver support/plan.She has since been enrolled in  "in-patient substance abuse program at [facility] where she has remained since early November; per patient, her last EtOH beverage was 11/01/2017.  Per chart review on 11/22/17:     "Patient's case was discussed in liver selection committee today. Per committee discussion, the SW will call patient's daughter to discuss caregiver plan at this time. If the caregiver plan is adequate, patient will need to follow up with either/both addiction psych or SW regarding substance abuse and plan. May need to do this inpatient if patient is still admitted."        Denies history of GI bleed, SBP, renal disease, or other complications of cirrhosis. Liver biopsy 10/24 demonstrated stage 4 cirrhosis. Records reports EGD in August 2016 that showed small esophageal varices with severe gastritis. Colonoscopy 2015 with 2 polyps and internal hemorrhoids Started on prednisone 40mg QD on 10/25; was on spironolactone, has been held since 11/10 for hypoNa; on lactulose.    Past Medical History:   Diagnosis Date    Alcohol abuse     Alcoholic hepatitis     Anemia of chronic disease     Coagulopathy     End stage liver disease     Hypertension     Hyponatremia     Liver cirrhosis     Liver transplant candidate        Past Surgical History:   Procedure Laterality Date    BREAST SURGERY      CHOLECYSTECTOMY      HYSTERECTOMY         Review of patient's allergies indicates:   Allergen Reactions    Ferrous sulfate Other (See Comments)     Patient states the pill makes her sick. She stated she would rather have a shot       Family History     Problem Relation (Age of Onset)    Depression Maternal Grandfather    Diabetes Father, Sister    Hypertension Mother, Father        Social History Main Topics    Smoking status: Never Smoker    Smokeless tobacco: Never Used    Alcohol use Yes      Comment: Currently admitted to inpatient rehab    Drug use: No    Sexual activity: Not Currently      Review of Systems   Unable to perform ROS: " Patient unresponsive   Gastrointestinal: Positive for abdominal pain.   Musculoskeletal: Positive for arthralgias and myalgias.   Psychiatric/Behavioral: Positive for decreased concentration.     Objective:     Vital Signs (Most Recent):  Temp: 97.5 °F (36.4 °C) (11/24/17 1605)  Pulse: 107 (11/24/17 1605)  Resp: 16 (11/24/17 1605)  BP: 109/62 (11/24/17 1605)  SpO2: 97 % (11/24/17 1605) Vital Signs (24h Range):  Temp:  [97.5 °F (36.4 °C)-98.4 °F (36.9 °C)] 97.5 °F (36.4 °C)  Pulse:  [] 107  Resp:  [14-18] 16  SpO2:  [97 %-99 %] 97 %  BP: (109-121)/(56-62) 109/62   Weight: 69.6 kg (153 lb 7 oz)  Body mass index is 25.53 kg/m².      Intake/Output Summary (Last 24 hours) at 11/24/17 1738  Last data filed at 11/24/17 1600   Gross per 24 hour   Intake               50 ml   Output                0 ml   Net               50 ml       Physical Exam  General: Well developed, well nourished female in NAD  HEENT: + scleral icterus; Oropharynx clear  Neck: No JVD noted, Supple  CV: Normal S1, S2 with no murmurs  Resp: Lungs CTA Bilaterally, Unlabored  Abdomen: distended and positive ascites. +ve BS and tenderness.   Extrem: No cyanosis, clubbing, edema.  Skin: No rashes, lesions, ulcers  Neuro: oriented to time, place and person. motor strength in tact. No focal deficit      Lines/Drains/Airways     Drain                 Rectal Tube 11/24/17 1300 less than 1 day          Peripheral Intravenous Line                 Peripheral IV - Single Lumen 11/21/17 1104 Left Forearm 3 days              Significant Labs:    CBC/Anemia Profile:    Recent Labs  Lab 11/23/17  0530 11/24/17  0410   WBC 11.15 11.15   HGB 11.1* 11.0*   HCT 29.7* 29.4*   PLT 80* 60*   MCV 79* 78*   RDW 24.5* 23.7*        Chemistries:    Recent Labs  Lab 11/23/17  0530  11/23/17  2221 11/23/17  2335 11/24/17  0410 11/24/17  1134   *  < > 119*  --  117*  117* 119*   K 3.4*  < > 3.5  --  3.4*  3.4* 3.8   CL 90*  < > 89*  --  87*  87* 88*   CO2 21*  < > 22*   --  22*  22* 23   BUN 10  < > 11  --  10  10 11   CREATININE 0.6  < > 0.7  --  0.6  0.6 0.7   CALCIUM 8.2*  < > 8.3*  --  8.0*  8.0* 8.3*   ALBUMIN 2.0*  --  2.1*  --  1.9*  --    PROT 5.6*  --  5.7*  --  5.7*  --    BILITOT 21.3*  --  23.7*  --  22.7*  --    ALKPHOS 292*  --  329*  --  321*  --    *  --  416*  --  397*  --    *  --  294*  --  284*  --    MG  --   --   --  1.8  --   --    PHOS  --   --   --  2.5*  --   --    < > = values in this interval not displayed.      Assessment/Plan:     * Decompensated hepatic cirrhosis    Recommend:    - Begin Prednisone Taper  - obtain addiction psych and social work consults  - Restart on lactulose therapy as soon as possible, titrate to 2-4 soft BMs/day  - Initiate Rifaximin therapy   - check CMP and INR at least BID   - Obtain procalcitonin   - check CMV and EBV PCR   - obtain rapid HIV   - limit sedating medications and would have frequent neuro checks   - Recommend Septic workup in this immunocompromised patient w/ initiation of empiric antibiotic therapy   - check sputum, urine, blood cultures   - Only give FFP or Platelets for invasive procedures or active bleeding   - place patient on PPI for stress ulcer prophylaxis   - consider transfer to ICU for higher level of care in this significantly hyponatremic patient w/ severe lethargy and cognitive disarray   - follow POCT glucose testing at least every 4-6 hours and more frequently if hypoglycemia occurs                 Thank you for your consult. I will follow-up with patient. Please contact us if you have any additional questions.    Magan Lazcano MD  Hepatology  [facility]

## 2017-12-01 NOTE — SUBJECTIVE & OBJECTIVE
Interval History: Patient considered moderate risk for relapse of alcohol.EGD performed today showed large varices which were banded, colonoscopy aborted due to poor prep.     Review of Systems   Constitutional: Negative for chills and fever.   HENT: Negative for rhinorrhea and sore throat.    Eyes: Negative for pain and discharge.   Respiratory: Negative for cough and shortness of breath.    Cardiovascular: Negative for chest pain and leg swelling.   Gastrointestinal: Positive for abdominal distention and diarrhea. Negative for abdominal pain, blood in stool, nausea and vomiting.   Endocrine: Negative for cold intolerance and heat intolerance.   Genitourinary: Negative for dysuria and flank pain.   Neurological: Negative for dizziness and numbness.   Psychiatric/Behavioral: Positive for confusion. Negative for behavioral problems.     Objective:     Vital Signs (Most Recent):  Temp: 97.3 °F (36.3 °C) (12/01/17 1524)  Pulse: 85 (12/01/17 1524)  Resp: 16 (12/01/17 1524)  BP: (!) 111/55 (12/01/17 1524)  SpO2: 97 % (12/01/17 1524) Vital Signs (24h Range):  Temp:  [96.9 °F (36.1 °C)-98.4 °F (36.9 °C)] 97.3 °F (36.3 °C)  Pulse:  [79-91] 85  Resp:  [16-20] 16  SpO2:  [92 %-100 %] 97 %  BP: ()/(55-78) 111/55     Weight: 69.6 kg (153 lb 7 oz)  Body mass index is 25.53 kg/m².    Intake/Output Summary (Last 24 hours) at 12/01/17 1652  Last data filed at 12/01/17 1042   Gross per 24 hour   Intake           3367.5 ml   Output              500 ml   Net           2867.5 ml      Physical Exam   Constitutional: She appears well-developed and well-nourished.   HENT:   Head: Normocephalic and atraumatic.   Eyes: Conjunctivae are normal. Pupils are equal, round, and reactive to light. Scleral icterus is present.   Neck: Normal range of motion. No thyromegaly present.   Cardiovascular: Normal rate, regular rhythm and normal heart sounds.    Pulmonary/Chest: Effort normal and breath sounds normal.   Abdominal: Soft. She exhibits  distension. There is no tenderness.   Musculoskeletal: Normal range of motion. She exhibits no edema.   Neurological: She is alert.   AAO times 2   Skin: No erythema. No pallor.       MELD-Na score: 30 at 12/1/2017  4:08 AM  MELD score: 28 at 12/1/2017  4:08 AM  Calculated from:  Serum Creatinine: 0.7 mg/dL (Rounded to 1) at 12/1/2017  4:08 AM  Serum Sodium: 131 mmol/L at 12/1/2017  4:08 AM  Total Bilirubin: 21.9 mg/dL at 12/1/2017  4:08 AM  INR(ratio): 2.4 at 12/1/2017  4:08 AM  Age: 52 years    Significant Labs:  CBC:    Recent Labs  Lab 11/30/17 0357 12/01/17 0408   WBC 6.83 6.01   HGB 8.6* 8.0*   HCT 23.2* 22.2*   PLT 59* 49*     CMP:    Recent Labs  Lab 11/30/17 0357 11/30/17  1548 12/01/17 0408   * 127* 131*   K 3.5 3.9 3.2*   CL 93* 92* 94*   CO2 23 26 25   GLU 82 121* 80   BUN 9 10 8   CREATININE 0.7 0.8 0.7   CALCIUM 8.8 9.2 9.1   PROT 5.3* 5.6* 5.4*   ALBUMIN 3.6 4.0 3.7   BILITOT 24.6* 23.5* 21.9*   ALKPHOS 182* 169* 151*   * 130* 100*   * 128* 108*   ANIONGAP 9 9 12   EGFRNONAA >60.0 >60.0 >60.0     PTINR:    Recent Labs  Lab 11/30/17 0357 12/01/17 0408   INR 3.0* 2.4*       Significant Procedures:   Dobutamine Stress Test with Color Flow:   Results for orders placed or performed during the hospital encounter of 10/19/17   Dobutamine Stress Test w/ Color Flow   Result Value Ref Range    EF 73 55 - 65    Diastolic Dysfunction No     Est. PA Systolic Pressure 21.32     Tricuspid Valve Regurgitation TRIVIAL

## 2017-12-01 NOTE — ANESTHESIA POSTPROCEDURE EVALUATION
"Anesthesia Post Evaluation    Patient: Marcie Bazan    Procedure(s) Performed: Procedure(s) (LRB):  ESOPHAGOGASTRODUODENOSCOPY (EGD) (N/A)  COLONOSCOPY (N/A)    Final Anesthesia Type: general  Patient location during evaluation: PACU  Patient participation: Yes- Able to Participate  Level of consciousness: awake and alert  Post-procedure vital signs: reviewed and stable  Pain management: adequate  Airway patency: patent  PONV status at discharge: No PONV  Anesthetic complications: no      Cardiovascular status: hemodynamically stable  Respiratory status: room air, spontaneous ventilation and unassisted  Hydration status: euvolemic  Follow-up not needed.        Visit Vitals  /65   Pulse 82   Temp 36.8 °C (98.3 °F)   Resp 18   Ht 5' 5" (1.651 m)   Wt 69.6 kg (153 lb 7 oz)   LMP  (LMP Unknown)   SpO2 99%   Breastfeeding? No   BMI 25.53 kg/m²       Pain/Larry Score: Pain Assessment Performed: Yes (12/1/2017 11:00 AM)  Presence of Pain: denies (12/1/2017 11:00 AM)  Larry Score: 10 (12/1/2017 10:48 AM)      "

## 2017-12-01 NOTE — PLAN OF CARE
Problem: Fall Risk (Adult)  Goal: Absence of Falls  Patient will demonstrate the desired outcomes by discharge/transition of care.   Outcome: Ongoing (interventions implemented as appropriate)  Absence of falls noted at present. SR up x's2. Bed alarm on at all times. Fall risk protocol IP. Pt reminded not to get OOB without assistance. Verbalized understanding. Call bell within reach. Will cont to monitor.

## 2017-12-01 NOTE — PROGRESS NOTES
[facility] Hospital Medicine  Progress Note    Patient Name: Marcie Bazan  MRN: [id number]  Patient Class: IP- Inpatient   Admission Date: 11/23/2017  Length of Stay: 8 days  Expected Discharge Date: 12/6/2017  Attending Physician: Surinder Madrigal MD  Primary Care Provider: Milton Ferrer, [facility] Medicine Team: [facility] A Surinder Madrigal MD  Principal Problem:Decompensated hepatic cirrhosis    Subjective:     HPI:   52 year old female with PMHx of 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 Patient presented to [facility] with generalized weakness. Onset two months ago, progressively worsening. Was admitted to  11/10 for hyponatremia seen on labs in GI office. Discharged on 11/15 and returned on 11/20 with 1 week worsening weakness, malaise, lethargy, and decreased appetite. Denied fever/chills, abdominal pain, bright red or black tarry stools, hematemesis or other abnormal bleeding, confusion or disorientation, changes in urination, light-headedness, headache, changes in vision, dyspnea, chest pain or palpitations. Labs demonstrated Na 116 and MELD 32; patient admitted for hyponatremia. Nephrology consulted, recommended fluid restriction , holding diuretics, and prednisode taper that started 11/23 (decreased from 40mg QD to 20mg Qd.) Patient transferred to [facility] for revaluation of liver disease. Of note, paracentesis performed 11/22, removed 2L, negative for SBP.    Follows with Dr. Souza for cirrhosis. Since early 09/2017, patient's liver failure progressing indicated by worsening clinical signs (abdominal distention, weakness/fatigue) and MELD. In mid-October, patient's PETH demonstrated EtOH use and she was denied candidacy for liver transplant. She was then enrolled in in-patient substance abuse program at [facility]  where she has remained since early November; per patient, her last EtOH beverage was 11/01/2017. Denies history of GI bleed, SBP, renal disease, or other complications of cirrhosis. Liver biopsy 10/24 demonstrated stage 4 cirrhosis. Records reports EGD in August 2016 that showed small esophageal varices with severe gastritis. Colonoscopy 2015 with 2 polyps and internal hemorrhoids Started on prednisone 40mg QD on 10/25; was on spironolactone, has been held since 11/10 for hypoNa; on lactulose.    Hospital Course:  Mental status improving with hepatic encephalopathy treatment. Started empiric ceftriaxone given leukocytosis in immunocompromised state. CMV PCR positive overnight, for which ID was consulted, recommended repeating titers in 1 week. Seen by Psychiatry and  who determined she was moderate risk for Liver transplant. Will proceed with EGD/Cscope and mammogram after FFP. Colonoscopy not performed due to poor prep, EGD revealed large varices which were banded.       Interval History: Patient considered moderate risk for relapse of alcohol.EGD performed today showed large varices which were banded, colonoscopy aborted due to poor prep.     Review of Systems   Constitutional: Negative for chills and fever.   HENT: Negative for rhinorrhea and sore throat.    Eyes: Negative for pain and discharge.   Respiratory: Negative for cough and shortness of breath.    Cardiovascular: Negative for chest pain and leg swelling.   Gastrointestinal: Positive for abdominal distention and diarrhea. Negative for abdominal pain, blood in stool, nausea and vomiting.   Endocrine: Negative for cold intolerance and heat intolerance.   Genitourinary: Negative for dysuria and flank pain.   Neurological: Negative for dizziness and numbness.   Psychiatric/Behavioral: Positive for confusion. Negative for behavioral problems.     Objective:     Vital Signs (Most Recent):  Temp: 97.3 °F (36.3 °C) (12/01/17 1524)  Pulse: 85  (12/01/17 1524)  Resp: 16 (12/01/17 1524)  BP: (!) 111/55 (12/01/17 1524)  SpO2: 97 % (12/01/17 1524) Vital Signs (24h Range):  Temp:  [96.9 °F (36.1 °C)-98.4 °F (36.9 °C)] 97.3 °F (36.3 °C)  Pulse:  [79-91] 85  Resp:  [16-20] 16  SpO2:  [92 %-100 %] 97 %  BP: ()/(55-78) 111/55     Weight: 69.6 kg (153 lb 7 oz)  Body mass index is 25.53 kg/m².    Intake/Output Summary (Last 24 hours) at 12/01/17 1652  Last data filed at 12/01/17 1042   Gross per 24 hour   Intake           3367.5 ml   Output              500 ml   Net           2867.5 ml      Physical Exam   Constitutional: She appears well-developed and well-nourished.   HENT:   Head: Normocephalic and atraumatic.   Eyes: Conjunctivae are normal. Pupils are equal, round, and reactive to light. Scleral icterus is present.   Neck: Normal range of motion. No thyromegaly present.   Cardiovascular: Normal rate, regular rhythm and normal heart sounds.    Pulmonary/Chest: Effort normal and breath sounds normal.   Abdominal: Soft. She exhibits distension. There is no tenderness.   Musculoskeletal: Normal range of motion. She exhibits no edema.   Neurological: She is alert.   AAO times 2   Skin: No erythema. No pallor.       MELD-Na score: 30 at 12/1/2017  4:08 AM  MELD score: 28 at 12/1/2017  4:08 AM  Calculated from:  Serum Creatinine: 0.7 mg/dL (Rounded to 1) at 12/1/2017  4:08 AM  Serum Sodium: 131 mmol/L at 12/1/2017  4:08 AM  Total Bilirubin: 21.9 mg/dL at 12/1/2017  4:08 AM  INR(ratio): 2.4 at 12/1/2017  4:08 AM  Age: 52 years    Significant Labs:  CBC:    Recent Labs  Lab 11/30/17  0357 12/01/17  0408   WBC 6.83 6.01   HGB 8.6* 8.0*   HCT 23.2* 22.2*   PLT 59* 49*     CMP:    Recent Labs  Lab 11/30/17  0357 11/30/17  1548 12/01/17  0408   * 127* 131*   K 3.5 3.9 3.2*   CL 93* 92* 94*   CO2 23 26 25   GLU 82 121* 80   BUN 9 10 8   CREATININE 0.7 0.8 0.7   CALCIUM 8.8 9.2 9.1   PROT 5.3* 5.6* 5.4*   ALBUMIN 3.6 4.0 3.7   BILITOT 24.6* 23.5* 21.9*   ALKPHOS  182* 169* 151*   * 130* 100*   * 128* 108*   ANIONGAP 9 9 12   EGFRNONAA >60.0 >60.0 >60.0     PTINR:    Recent Labs  Lab 11/30/17  0357 12/01/17  0408   INR 3.0* 2.4*       Significant Procedures:   Dobutamine Stress Test with Color Flow:   Results for orders placed or performed during the hospital encounter of 10/19/17   Dobutamine Stress Test w/ Color Flow   Result Value Ref Range    EF 73 55 - 65    Diastolic Dysfunction No     Est. PA Systolic Pressure 21.32     Tricuspid Valve Regurgitation TRIVIAL          Assessment / Plan:     * Decompensated hepatic cirrhosis    MELD-Na score: 30 at 12/1/2017  4:08 AM  MELD score: 28 at 12/1/2017  4:08 AM  Calculated from:  Serum Creatinine: 0.7 mg/dL (Rounded to 1) at 12/1/2017  4:08 AM  Serum Sodium: 131 mmol/L at 12/1/2017  4:08 AM  Total Bilirubin: 21.9 mg/dL at 12/1/2017  4:08 AM  INR(ratio): 2.4 at 12/1/2017  4:08 AM  Age: 52 years   - will ask for clearance for inpatient liver tx evaluation tomorrow  - had half of her evaluation completed on last admission  - AS per Psychiatry patient is moderate risk, will proceed with Liver transplant evaluation  - EGD on 12/01 revealed large varices which were banded, colonoscopy had poor prep.       Immunosuppressed status    On chronic prednisone for alcoholic hepatitis; currently 15 mg, would go down by 5 weekly, next change would be 10 mg this saturday          Coagulopathy    - vitamin k for 3 days  - transfuse 2 units of FFP prior to EGD/colonoscopy on 12/01/17          Alcoholic cirrhosis of liver with ascites    - see decompensated cirrhosis           Cytomegalovirus (CMV) viremia    -- ID evaluated and do not feel it needs to be treated        Thrombocytopenia    -- monitor           Abnormal liver enzymes    -- Stable  -- Etiology liver failure; see that section for more details          Alcoholic hepatitis with ascites- decompensated with elevated MELD    - cont tapering prednisone, currently at 15 mg         Hyponatremia    - on 800 cc fluid restriction; on albumin  - 116-->118-->120-->122-->123-->126-->125-->127-->131        End stage liver disease              HCC (hepatocellular carcinoma)    AFP 9; seen on CT on 10/19, 1 cm focus, monitor for now          Hepatic encephalopathy    -- Improving, though not yet returned to baseline, AAO times 3; put out 1 L in flexi-seal  -- Continue lactulose and rifaximin          Severe protein-calorie malnutrition    PAB, boost and dietary consult          Ascites    -- Holding diuretics at present, s/p 2L removed on 11/22 with testing negative for SBP  -- No abdominal pain today              Essential hypertension    - Stable  - Holding home lisinopril in setting of decompensated liver disease          Anemia of chronic disease    - monitor daily                VTE Risk Mitigation         Ordered     Place sequential compression device  Until discontinued      11/24/17 0734     Place FABIOLA hose  Until discontinued      11/23/17 2305     Medium Risk of VTE  Once      11/23/17 2305           Discharge Disposition: pending Liver transplant committee presentation    Time taken to evaluate, plan, and coordinate patient care: 35 minutes.    Surinder Madrigal MD  Department of Hospital Medicine  [facility]

## 2017-12-01 NOTE — PATIENT INSTRUCTIONS
Discharge Summary/Instructions after an Endoscopic Procedure  Patient Name: Marcie Bazan  Patient MRN: [id number]  Patient YOB: 1965  Friday, December 01, 2017  Filemon Fuentes MD  RESTRICTIONS:  During your procedure today, you received medications for sedation.  These   medications may affect your judgment, balance and coordination.  Therefore,   for 24 hours, you have the following restrictions:   - DO NOT drive a car, operate machinery, make legal/financial decisions,   sign important papers or drink alcohol.    ACTIVITY:  The following day: return to full activity including work, except no heavy   lifting, straining or running for 3 days if polyps were removed.  DIET:  Eat and drink normally unless instructed otherwise.     TREATMENT FOR COMMON SIDE EFFECTS:  - Mild abdominal pain, belching, bloating or excessive gas: rest, eat   lightly and use a heating pad.  - Sore Throat: treat with throat lozenges and/or gargle with warm salt   water.  SYMPTOMS TO WATCH FOR AND REPORT TO YOUR PHYSICIAN:  1. Abdominal pain or bloating, other than gas cramps.  2. Chest pain.  3. Back pain.  4. Chills or fever occurring within 24 hours after the procedure.  5. Rectal bleeding, which would show as bright red, maroon, or black stools.   (A tablespoon of blood from the rectum is not serious, especially if   hemorrhoids are present.)  6. Vomiting.  7. Weakness or dizziness.  8. Because air was used during the procedure, expelling large amounts of air   from your rectum or belching is normal.  9. If a bowel prep was taken, you may not have a bowel movement for 1-3   days.  This is normal.  GO DIRECTLY TO THE NEAREST EMERGENCY ROOM IF YOU HAVE ANY OF THE FOLLOWING:      Difficulty breathing  Chills and/or fever over 101 F   Persistent vomiting and/or vomiting blood   Severe abdominal pain   Severe chest pain   Black, tarry stools   Bleeding- more than one tablespoon   Any other symptom or condition that you may feel needs  urgent attention  Your doctor recommends these additional instructions:  If any biopsies were taken, your doctor s clinic will contact you in 1 to 2   weeks with any results.  Resume your previous diet.   Continue your present medications.  For questions, problems or results please call your physician - Filemon Fuentes MD at Work:  [phone].  [facility], EMERGENCY ROOM PHONE NUMBER: [phone]  IF A COMPLICATION OR EMERGENCY SITUATION ARISES AND YOU ARE UNABLE TO REACH   YOUR PHYSICIAN - GO DIRECTLY TO THE EMERGENCY ROOM.  Filemon Fuentes MD  12/1/2017 10:31:56 AM  This report has been verified and signed electronically.

## 2017-12-01 NOTE — PROGRESS NOTES
Report called to pts nurse. nad to note. Pt denies any pain. Vitals noted and stable. Transport notified

## 2017-12-01 NOTE — PATIENT INSTRUCTIONS
Discharge Summary/Instructions after an Endoscopic Procedure  Patient Name: Marcie Bazan  Patient MRN: [id number]  Patient YOB: 1965  Friday, December 01, 2017  Filemon Fuentes MD  RESTRICTIONS:  During your procedure today, you received medications for sedation.  These   medications may affect your judgment, balance and coordination.  Therefore,   for 24 hours, you have the following restrictions:   - DO NOT drive a car, operate machinery, make legal/financial decisions,   sign important papers or drink alcohol.    ACTIVITY:  The following day: return to full activity including work, except no heavy   lifting, straining or running for 3 days if polyps were removed.  DIET:  Eat and drink normally unless instructed otherwise.     TREATMENT FOR COMMON SIDE EFFECTS:  - Mild abdominal pain, belching, bloating or excessive gas: rest, eat   lightly and use a heating pad.  - Sore Throat: treat with throat lozenges and/or gargle with warm salt   water.  SYMPTOMS TO WATCH FOR AND REPORT TO YOUR PHYSICIAN:  1. Abdominal pain or bloating, other than gas cramps.  2. Chest pain.  3. Back pain.  4. Chills or fever occurring within 24 hours after the procedure.  5. Rectal bleeding, which would show as bright red, maroon, or black stools.   (A tablespoon of blood from the rectum is not serious, especially if   hemorrhoids are present.)  6. Vomiting.  7. Weakness or dizziness.  8. Because air was used during the procedure, expelling large amounts of air   from your rectum or belching is normal.  9. If a bowel prep was taken, you may not have a bowel movement for 1-3   days.  This is normal.  GO DIRECTLY TO THE NEAREST EMERGENCY ROOM IF YOU HAVE ANY OF THE FOLLOWING:      Difficulty breathing  Chills and/or fever over 101 F   Persistent vomiting and/or vomiting blood   Severe abdominal pain   Severe chest pain   Black, tarry stools   Bleeding- more than one tablespoon   Any other symptom or condition that you may feel needs  urgent attention  Your doctor recommends these additional instructions:  If any biopsies were taken, your doctor s clinic will contact you in 1 to 2   weeks with any results.  Advance your diet as tolerated.   Continue your present medications.   Your physician has recommended a repeat upper endoscopy in six months for   surveillance.  For questions, problems or results please call your physician - Filemon Fuentes MD at Work:  [phone].  [facility], EMERGENCY ROOM PHONE NUMBER: [phone]  IF A COMPLICATION OR EMERGENCY SITUATION ARISES AND YOU ARE UNABLE TO REACH   YOUR PHYSICIAN - GO DIRECTLY TO THE EMERGENCY ROOM.  Filemon Fuentes MD  12/1/2017 10:19:27 AM  This report has been verified and signed electronically.

## 2017-12-01 NOTE — INTERVAL H&P NOTE
The patient has been examined and the H&P has been reviewed:    I concur with the findings and no changes have occurred since H&P was written.    Anesthesia/Surgery risks, benefits and alternative options discussed and understood by patient/family.          Active Hospital Problems    Diagnosis  POA    *Decompensated hepatic cirrhosis [K72.90]  Yes    Transplant donor evaluation [Z00.5]  Not Applicable    Immunosuppressed status [D89.9]  Yes    Alcoholic cirrhosis of liver with ascites [K70.31]  Yes    Coagulopathy [D68.9]  Yes    Abnormal liver enzymes [R74.8]  Yes    Thrombocytopenia [D69.6]  Yes    Cytomegalovirus (CMV) viremia [B25.9]  Yes    Alcoholic hepatitis with ascites- decompensated with elevated MELD [K70.11]  Yes    Hyponatremia [E87.1]  Yes    HCC (hepatocellular carcinoma) [C22.0]  Yes    Hepatic encephalopathy [K72.90]  Yes    Severe protein-calorie malnutrition [E43]  Yes    Ascites [R18.8]  Yes    Anemia of chronic disease [D64.9]  Yes     Chronic      Resolved Hospital Problems    Diagnosis Date Resolved POA   No resolved problems to display.

## 2017-12-02 LAB
ALBUMIN SERPL BCP-MCNC: 4 G/DL
ALBUMIN SERPL BCP-MCNC: 4.4 G/DL
ALP SERPL-CCNC: 142 U/L
ALP SERPL-CCNC: 144 U/L
ALT SERPL W/O P-5'-P-CCNC: 92 U/L
ALT SERPL W/O P-5'-P-CCNC: 93 U/L
ANION GAP SERPL CALC-SCNC: 10 MMOL/L
ANION GAP SERPL CALC-SCNC: 10 MMOL/L
AST SERPL-CCNC: 90 U/L
AST SERPL-CCNC: 91 U/L
BASOPHILS # BLD AUTO: 0.01 K/UL
BASOPHILS NFR BLD: 0.2 %
BILIRUB SERPL-MCNC: 22.1 MG/DL
BILIRUB SERPL-MCNC: 23 MG/DL
BUN SERPL-MCNC: 7 MG/DL
BUN SERPL-MCNC: 8 MG/DL
CALCIUM SERPL-MCNC: 9.1 MG/DL
CALCIUM SERPL-MCNC: 9.5 MG/DL
CHLORIDE SERPL-SCNC: 96 MMOL/L
CHLORIDE SERPL-SCNC: 96 MMOL/L
CO2 SERPL-SCNC: 26 MMOL/L
CO2 SERPL-SCNC: 26 MMOL/L
CREAT SERPL-MCNC: 0.7 MG/DL
CREAT SERPL-MCNC: 0.7 MG/DL
DIFFERENTIAL METHOD: ABNORMAL
EOSINOPHIL # BLD AUTO: 0 K/UL
EOSINOPHIL NFR BLD: 0.2 %
ERYTHROCYTE [DISTWIDTH] IN BLOOD BY AUTOMATED COUNT: 23.7 %
EST. GFR  (AFRICAN AMERICAN): >60 ML/MIN/1.73 M^2
EST. GFR  (AFRICAN AMERICAN): >60 ML/MIN/1.73 M^2
EST. GFR  (NON AFRICAN AMERICAN): >60 ML/MIN/1.73 M^2
EST. GFR  (NON AFRICAN AMERICAN): >60 ML/MIN/1.73 M^2
GLUCOSE SERPL-MCNC: 114 MG/DL
GLUCOSE SERPL-MCNC: 94 MG/DL
HCT VFR BLD AUTO: 23.1 %
HGB BLD-MCNC: 8.3 G/DL
IMM GRANULOCYTES # BLD AUTO: 0.02 K/UL
IMM GRANULOCYTES NFR BLD AUTO: 0.3 %
INR PPP: 2.9
LYMPHOCYTES # BLD AUTO: 1.6 K/UL
LYMPHOCYTES NFR BLD: 26.8 %
MAGNESIUM SERPL-MCNC: 1.8 MG/DL
MCH RBC QN AUTO: 30.9 PG
MCHC RBC AUTO-ENTMCNC: 35.9 G/DL
MCV RBC AUTO: 86 FL
MONOCYTES # BLD AUTO: 0.4 K/UL
MONOCYTES NFR BLD: 7 %
NEUTROPHILS # BLD AUTO: 3.9 K/UL
NEUTROPHILS NFR BLD: 65.5 %
NRBC BLD-RTO: 0 /100 WBC
PHOSPHATE SERPL-MCNC: 1.8 MG/DL
PLATELET # BLD AUTO: 45 K/UL
PMV BLD AUTO: ABNORMAL FL
POTASSIUM SERPL-SCNC: 3 MMOL/L
POTASSIUM SERPL-SCNC: 3.4 MMOL/L
PROT SERPL-MCNC: 5.4 G/DL
PROT SERPL-MCNC: 6 G/DL
PROTHROMBIN TIME: 29.5 SEC
RBC # BLD AUTO: 2.69 M/UL
SODIUM SERPL-SCNC: 132 MMOL/L
SODIUM SERPL-SCNC: 132 MMOL/L
WBC # BLD AUTO: 5.89 K/UL

## 2017-12-02 PROCEDURE — 36415 COLL VENOUS BLD VENIPUNCTURE: CPT

## 2017-12-02 PROCEDURE — 25000003 PHARM REV CODE 250: Performed by: HOSPITALIST

## 2017-12-02 PROCEDURE — P9047 ALBUMIN (HUMAN), 25%, 50ML: HCPCS | Performed by: HOSPITALIST

## 2017-12-02 PROCEDURE — 85025 COMPLETE CBC W/AUTO DIFF WBC: CPT

## 2017-12-02 PROCEDURE — 25000003 PHARM REV CODE 250: Performed by: STUDENT IN AN ORGANIZED HEALTH CARE EDUCATION/TRAINING PROGRAM

## 2017-12-02 PROCEDURE — 63600175 PHARM REV CODE 636 W HCPCS: Performed by: HOSPITALIST

## 2017-12-02 PROCEDURE — 84100 ASSAY OF PHOSPHORUS: CPT

## 2017-12-02 PROCEDURE — 20600001 HC STEP DOWN PRIVATE ROOM

## 2017-12-02 PROCEDURE — 80053 COMPREHEN METABOLIC PANEL: CPT

## 2017-12-02 PROCEDURE — 83735 ASSAY OF MAGNESIUM: CPT

## 2017-12-02 PROCEDURE — 85610 PROTHROMBIN TIME: CPT

## 2017-12-02 PROCEDURE — 99233 SBSQ HOSP IP/OBS HIGH 50: CPT | Mod: ,,, | Performed by: HOSPITALIST

## 2017-12-02 RX ORDER — POTASSIUM CHLORIDE 20 MEQ/1
40 TABLET, EXTENDED RELEASE ORAL ONCE
Status: DISCONTINUED | OUTPATIENT
Start: 2017-12-02 | End: 2017-12-02

## 2017-12-02 RX ORDER — POTASSIUM CHLORIDE 7.45 MG/ML
10 INJECTION INTRAVENOUS ONCE
Status: COMPLETED | OUTPATIENT
Start: 2017-12-02 | End: 2017-12-02

## 2017-12-02 RX ADMIN — POTASSIUM & SODIUM PHOSPHATES POWDER PACK 280-160-250 MG 2 PACKET: 280-160-250 PACK at 05:12

## 2017-12-02 RX ADMIN — ALBUMIN (HUMAN) 25 G: 12.5 SOLUTION INTRAVENOUS at 05:12

## 2017-12-02 RX ADMIN — ONDANSETRON 4 MG: 4 TABLET, ORALLY DISINTEGRATING ORAL at 11:12

## 2017-12-02 RX ADMIN — PREDNISONE 10 MG: 10 TABLET ORAL at 08:12

## 2017-12-02 RX ADMIN — POTASSIUM & SODIUM PHOSPHATES POWDER PACK 280-160-250 MG 2 PACKET: 280-160-250 PACK at 11:12

## 2017-12-02 RX ADMIN — ALBUMIN (HUMAN) 25 G: 12.5 SOLUTION INTRAVENOUS at 01:12

## 2017-12-02 RX ADMIN — POTASSIUM CHLORIDE 10 MEQ: 10 INJECTION, SOLUTION INTRAVENOUS at 01:12

## 2017-12-02 RX ADMIN — PANTOPRAZOLE SODIUM 40 MG: 40 TABLET, DELAYED RELEASE ORAL at 08:12

## 2017-12-02 RX ADMIN — LACTULOSE 20 G: 20 SOLUTION ORAL at 09:12

## 2017-12-02 RX ADMIN — DEXTROSE MONOHYDRATE 40 MMOL: 5 INJECTION, SOLUTION INTRAVENOUS at 04:12

## 2017-12-02 RX ADMIN — RIFAXIMIN 550 MG: 550 TABLET ORAL at 08:12

## 2017-12-02 RX ADMIN — LACTULOSE 20 G: 20 SOLUTION ORAL at 05:12

## 2017-12-02 RX ADMIN — LACTULOSE 20 G: 20 SOLUTION ORAL at 01:12

## 2017-12-02 RX ADMIN — RIFAXIMIN 550 MG: 550 TABLET ORAL at 09:12

## 2017-12-02 NOTE — ASSESSMENT & PLAN NOTE
MELD-Na score: 32 at 12/2/2017  4:15 AM  MELD score: 30 at 12/2/2017  4:15 AM  Calculated from:  Serum Creatinine: 0.7 mg/dL (Rounded to 1) at 12/2/2017  4:15 AM  Serum Sodium: 132 mmol/L at 12/2/2017  4:15 AM  Total Bilirubin: 22.1 mg/dL at 12/2/2017  4:15 AM  INR(ratio): 2.9 at 12/2/2017  4:15 AM  Age: 52 years   - had half of her evaluation completed on last admission  - AS per Psychiatry patient is moderate risk, will proceed with Liver transplant evaluation  - EGD performed, esophageal varices banded, colonoscopy aborted due to poor prep

## 2017-12-02 NOTE — NURSING
Patient arrived on the Unit around 2016.  AAO.  Vital signs stable.  Afebrile.  No complaints of chest pain or shortness of breath. No coughing.  Eyeglasses at the bedside.   Eyes - yellow.  Abdomen large.  Assisted to the bathroom several times this shift.  Patient remained free of fall or injury this shift.  Patient turned and repositioned self.  Patient rounds made more than every hour.  No acute distress noted this shift.

## 2017-12-02 NOTE — PLAN OF CARE
Problem: Patient Care Overview  Goal: Plan of Care Review  Outcome: Ongoing (interventions implemented as appropriate)  VSS. Safety and hygiene maintained. Absence of falls noted at present. Fall risk protocol IP. Pt reminded not to get OOB without assistance. Verbalized understanding. Call bell within reach. Will cont to monitor.

## 2017-12-02 NOTE — PROGRESS NOTES
[facility] Hospital Medicine  Progress Note    Patient Name: Marcie Bazan  MRN: [id number]  Patient Class: IP- Inpatient   Admission Date: 11/23/2017  Length of Stay: 9 days  Expected Discharge Date: 12/6/2017  Attending Physician: Surinder Madrigal MD  Primary Care Provider: Milton Ferrer, [facility] Medicine Team: [facility] HOSP MED A Surinder Madrigal MD  Principal Problem:Decompensated hepatic cirrhosis    Subjective:     HPI:   52 year old female with PMHx of 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 Patient presented to [facility] with generalized weakness. Onset two months ago, progressively worsening. Was admitted to  11/10 for hyponatremia seen on labs in GI office. Discharged on 11/15 and returned on 11/20 with 1 week worsening weakness, malaise, lethargy, and decreased appetite. Denied fever/chills, abdominal pain, bright red or black tarry stools, hematemesis or other abnormal bleeding, confusion or disorientation, changes in urination, light-headedness, headache, changes in vision, dyspnea, chest pain or palpitations. Labs demonstrated Na 116 and MELD 32; patient admitted for hyponatremia. Nephrology consulted, recommended fluid restriction , holding diuretics, and prednisode taper that started 11/23 (decreased from 40mg QD to 20mg Qd.) Patient transferred to [facility] for revaluation of liver disease. Of note, paracentesis performed 11/22, removed 2L, negative for SBP.    Follows with Dr. Souza for cirrhosis. Since early 09/2017, patient's liver failure progressing indicated by worsening clinical signs (abdominal distention, weakness/fatigue) and MELD. In mid-October, patient's PETH demonstrated EtOH use and she was denied candidacy for liver transplant. She was then enrolled in in-patient substance abuse program at [facility]  where she has remained since early November; per patient, her last EtOH beverage was 11/01/2017. Denies history of GI bleed, SBP, renal disease, or other complications of cirrhosis. Liver biopsy 10/24 demonstrated stage 4 cirrhosis. Records reports EGD in August 2016 that showed small esophageal varices with severe gastritis. Colonoscopy 2015 with 2 polyps and internal hemorrhoids Started on prednisone 40mg QD on 10/25; was on spironolactone, has been held since 11/10 for hypoNa; on lactulose.    Hospital Course:  Mental status improving with hepatic encephalopathy treatment. Started empiric ceftriaxone given leukocytosis in immunocompromised state. CMV PCR positive overnight, for which ID was consulted, recommended repeating titers in 1 week. Seen by Psychiatry and  who determined she was moderate risk for Liver transplant. Will proceed with EGD/Cscope and mammogram after FFP. Colonoscopy not performed due to poor prep, EGD revealed large varices which were banded.      Interval History: Patient considered moderate risk for relapse of alcohol.EGD performed today showed large varices which were banded, colonoscopy aborted due to poor prep.     Review of Systems   Constitutional: Negative for chills and fever.   HENT: Negative for rhinorrhea and sore throat.    Eyes: Negative for pain and discharge.   Respiratory: Negative for cough and shortness of breath.    Cardiovascular: Negative for chest pain and leg swelling.   Gastrointestinal: Positive for abdominal distention and diarrhea. Negative for abdominal pain, blood in stool, nausea and vomiting.   Endocrine: Negative for cold intolerance and heat intolerance.   Genitourinary: Negative for dysuria and flank pain.   Neurological: Negative for dizziness and numbness.   Psychiatric/Behavioral: Positive for confusion. Negative for behavioral problems.     Objective:     Vital Signs (Most Recent):  Temp: 98.1 °F (36.7 °C) (12/02/17 1119)  Pulse: 87  (12/02/17 1119)  Resp: 18 (12/02/17 1119)  BP: 119/64 (12/02/17 1119)  SpO2: 98 % (12/02/17 1119) Vital Signs (24h Range):  Temp:  [97.3 °F (36.3 °C)-98.6 °F (37 °C)] 98.1 °F (36.7 °C)  Pulse:  [85-89] 87  Resp:  [16-18] 18  SpO2:  [97 %-100 %] 98 %  BP: (104-119)/(55-69) 119/64     Weight: 69.6 kg (153 lb 7 oz)  Body mass index is 25.53 kg/m².    Intake/Output Summary (Last 24 hours) at 12/02/17 1402  Last data filed at 12/02/17 1325   Gross per 24 hour   Intake             1380 ml   Output                0 ml   Net             1380 ml      Physical Exam   Constitutional: She appears well-developed and well-nourished.   HENT:   Head: Normocephalic and atraumatic.   Eyes: Conjunctivae are normal. Pupils are equal, round, and reactive to light. Scleral icterus is present.   Neck: Normal range of motion. No thyromegaly present.   Cardiovascular: Normal rate, regular rhythm and normal heart sounds.    Pulmonary/Chest: Effort normal and breath sounds normal.   Abdominal: Soft. She exhibits distension. There is no tenderness.   Musculoskeletal: Normal range of motion. She exhibits no edema.   Neurological: She is alert.   AAO times 2   Skin: No erythema. No pallor.       MELD-Na score: 32 at 12/2/2017  4:15 AM  MELD score: 30 at 12/2/2017  4:15 AM  Calculated from:  Serum Creatinine: 0.7 mg/dL (Rounded to 1) at 12/2/2017  4:15 AM  Serum Sodium: 132 mmol/L at 12/2/2017  4:15 AM  Total Bilirubin: 22.1 mg/dL at 12/2/2017  4:15 AM  INR(ratio): 2.9 at 12/2/2017  4:15 AM  Age: 52 years    Significant Labs:  CBC:    Recent Labs  Lab 12/01/17  0408 12/02/17  0416   WBC 6.01 5.89   HGB 8.0* 8.3*   HCT 22.2* 23.1*   PLT 49* 45*     CMP:    Recent Labs  Lab 11/30/17  1548 12/01/17  0408 12/02/17  0415   * 131* 132*   K 3.9 3.2* 3.0*   CL 92* 94* 96   CO2 26 25 26   * 80 94   BUN 10 8 8   CREATININE 0.8 0.7 0.7   CALCIUM 9.2 9.1 9.1   PROT 5.6* 5.4* 5.4*   ALBUMIN 4.0 3.7 4.0   BILITOT 23.5* 21.9* 22.1*   ALKPHOS 169*  151* 142*   * 100* 90*   * 108* 93*   ANIONGAP 9 12 10   EGFRNONAA >60.0 >60.0 >60.0     PTINR:    Recent Labs  Lab 12/01/17  0408 12/02/17  0415   INR 2.4* 2.9*       Significant Procedures:   Dobutamine Stress Test with Color Flow:   Results for orders placed or performed during the hospital encounter of 10/19/17   Dobutamine Stress Test w/ Color Flow   Result Value Ref Range    EF 73 55 - 65    Diastolic Dysfunction No     Est. PA Systolic Pressure 21.32     Tricuspid Valve Regurgitation TRIVIAL          Assessment / Plan:     * Decompensated hepatic cirrhosis    MELD-Na score: 32 at 12/2/2017  4:15 AM  MELD score: 30 at 12/2/2017  4:15 AM  Calculated from:  Serum Creatinine: 0.7 mg/dL (Rounded to 1) at 12/2/2017  4:15 AM  Serum Sodium: 132 mmol/L at 12/2/2017  4:15 AM  Total Bilirubin: 22.1 mg/dL at 12/2/2017  4:15 AM  INR(ratio): 2.9 at 12/2/2017  4:15 AM  Age: 52 years   - had half of her evaluation completed on last admission  - AS per Psychiatry patient is moderate risk, will proceed with Liver transplant evaluation  - EGD performed, esophageal varices banded, colonoscopy aborted due to poor prep         Immunosuppressed status    On chronic prednisone for alcoholic hepatitis; currently 15 mg, would go down by 5 weekly, next change would be 10 mg this saturday          Coagulopathy    - vitamin k for 3 days  - transfuse 2 units of FFP prior to EGD/colonoscopy on 12/01/17          Alcoholic cirrhosis of liver with ascites    - see decompensated cirrhosis           Cytomegalovirus (CMV) viremia    -- ID evaluated and do not feel it needs to be treated        Thrombocytopenia    -- monitor           Abnormal liver enzymes    -- Stable  -- Etiology liver failure; see that section for more details          Alcoholic hepatitis with ascites- decompensated with elevated MELD    - cont tapering prednisone, currently at 15 mg        Hyponatremia    - on 800 cc fluid restriction; on albumin  -  116-->118-->120-->122-->123-->126-->125-->127-->131        End stage liver disease              HCC (hepatocellular carcinoma)    AFP 9; seen on CT on 10/19, 1 cm focus, monitor for now          Hepatic encephalopathy    -- Improving, though not yet returned to baseline, AAO times 3; put out 1 L in flexi-seal  -- Continue lactulose and rifaximin          Severe protein-calorie malnutrition    PAB, boost and dietary consult          Ascites    -- Holding diuretics at present, s/p 2L removed on 11/22 with testing negative for SBP  -- No abdominal pain today              Essential hypertension    - Stable  - Holding home lisinopril in setting of decompensated liver disease          Anemia of chronic disease    - monitor daily                VTE Risk Mitigation         Ordered     Place sequential compression device  Until discontinued      11/24/17 0734     Place FABIOLA hose  Until discontinued      11/23/17 2305     Medium Risk of VTE  Once      11/23/17 2305           Discharge Disposition: pending Liver transplant evaluation     Time taken to evaluate, plan, and coordinate patient care: 35 minutes.    Surinder Madrigal MD  Department of Hospital Medicine  [facility]

## 2017-12-02 NOTE — SUBJECTIVE & OBJECTIVE
Interval History: Patient considered moderate risk for relapse of alcohol.EGD performed today showed large varices which were banded, colonoscopy aborted due to poor prep.     Review of Systems   Constitutional: Negative for chills and fever.   HENT: Negative for rhinorrhea and sore throat.    Eyes: Negative for pain and discharge.   Respiratory: Negative for cough and shortness of breath.    Cardiovascular: Negative for chest pain and leg swelling.   Gastrointestinal: Positive for abdominal distention and diarrhea. Negative for abdominal pain, blood in stool, nausea and vomiting.   Endocrine: Negative for cold intolerance and heat intolerance.   Genitourinary: Negative for dysuria and flank pain.   Neurological: Negative for dizziness and numbness.   Psychiatric/Behavioral: Positive for confusion. Negative for behavioral problems.     Objective:     Vital Signs (Most Recent):  Temp: 98.1 °F (36.7 °C) (12/02/17 1119)  Pulse: 87 (12/02/17 1119)  Resp: 18 (12/02/17 1119)  BP: 119/64 (12/02/17 1119)  SpO2: 98 % (12/02/17 1119) Vital Signs (24h Range):  Temp:  [97.3 °F (36.3 °C)-98.6 °F (37 °C)] 98.1 °F (36.7 °C)  Pulse:  [85-89] 87  Resp:  [16-18] 18  SpO2:  [97 %-100 %] 98 %  BP: (104-119)/(55-69) 119/64     Weight: 69.6 kg (153 lb 7 oz)  Body mass index is 25.53 kg/m².    Intake/Output Summary (Last 24 hours) at 12/02/17 1402  Last data filed at 12/02/17 1325   Gross per 24 hour   Intake             1380 ml   Output                0 ml   Net             1380 ml      Physical Exam   Constitutional: She appears well-developed and well-nourished.   HENT:   Head: Normocephalic and atraumatic.   Eyes: Conjunctivae are normal. Pupils are equal, round, and reactive to light. Scleral icterus is present.   Neck: Normal range of motion. No thyromegaly present.   Cardiovascular: Normal rate, regular rhythm and normal heart sounds.    Pulmonary/Chest: Effort normal and breath sounds normal.   Abdominal: Soft. She exhibits  distension. There is no tenderness.   Musculoskeletal: Normal range of motion. She exhibits no edema.   Neurological: She is alert.   AAO times 2   Skin: No erythema. No pallor.       MELD-Na score: 32 at 12/2/2017  4:15 AM  MELD score: 30 at 12/2/2017  4:15 AM  Calculated from:  Serum Creatinine: 0.7 mg/dL (Rounded to 1) at 12/2/2017  4:15 AM  Serum Sodium: 132 mmol/L at 12/2/2017  4:15 AM  Total Bilirubin: 22.1 mg/dL at 12/2/2017  4:15 AM  INR(ratio): 2.9 at 12/2/2017  4:15 AM  Age: 52 years    Significant Labs:  CBC:    Recent Labs  Lab 12/01/17  0408 12/02/17 0416   WBC 6.01 5.89   HGB 8.0* 8.3*   HCT 22.2* 23.1*   PLT 49* 45*     CMP:    Recent Labs  Lab 11/30/17  1548 12/01/17  0408 12/02/17 0415   * 131* 132*   K 3.9 3.2* 3.0*   CL 92* 94* 96   CO2 26 25 26   * 80 94   BUN 10 8 8   CREATININE 0.8 0.7 0.7   CALCIUM 9.2 9.1 9.1   PROT 5.6* 5.4* 5.4*   ALBUMIN 4.0 3.7 4.0   BILITOT 23.5* 21.9* 22.1*   ALKPHOS 169* 151* 142*   * 100* 90*   * 108* 93*   ANIONGAP 9 12 10   EGFRNONAA >60.0 >60.0 >60.0     PTINR:    Recent Labs  Lab 12/01/17  0408 12/02/17 0415   INR 2.4* 2.9*       Significant Procedures:   Dobutamine Stress Test with Color Flow:   Results for orders placed or performed during the hospital encounter of 10/19/17   Dobutamine Stress Test w/ Color Flow   Result Value Ref Range    EF 73 55 - 65    Diastolic Dysfunction No     Est. PA Systolic Pressure 21.32     Tricuspid Valve Regurgitation TRIVIAL

## 2017-12-03 LAB
ALBUMIN SERPL BCP-MCNC: 4.2 G/DL
ALBUMIN SERPL BCP-MCNC: 4.4 G/DL
ALP SERPL-CCNC: 140 U/L
ALP SERPL-CCNC: 162 U/L
ALT SERPL W/O P-5'-P-CCNC: 89 U/L
ALT SERPL W/O P-5'-P-CCNC: 99 U/L
ANION GAP SERPL CALC-SCNC: 12 MMOL/L
ANION GAP SERPL CALC-SCNC: 12 MMOL/L
ANISOCYTOSIS BLD QL SMEAR: ABNORMAL
AST SERPL-CCNC: 82 U/L
AST SERPL-CCNC: 89 U/L
BASOPHILS # BLD AUTO: 0.01 K/UL
BASOPHILS NFR BLD: 0.1 %
BILIRUB SERPL-MCNC: 23.3 MG/DL
BILIRUB SERPL-MCNC: 23.9 MG/DL
BUN SERPL-MCNC: 6 MG/DL
BUN SERPL-MCNC: 7 MG/DL
CALCIUM SERPL-MCNC: 9.3 MG/DL
CALCIUM SERPL-MCNC: 9.4 MG/DL
CHLORIDE SERPL-SCNC: 96 MMOL/L
CHLORIDE SERPL-SCNC: 96 MMOL/L
CO2 SERPL-SCNC: 24 MMOL/L
CO2 SERPL-SCNC: 25 MMOL/L
CREAT SERPL-MCNC: 0.7 MG/DL
CREAT SERPL-MCNC: 0.8 MG/DL
DIFFERENTIAL METHOD: ABNORMAL
EOSINOPHIL # BLD AUTO: 0 K/UL
EOSINOPHIL NFR BLD: 0.3 %
ERYTHROCYTE [DISTWIDTH] IN BLOOD BY AUTOMATED COUNT: 23.4 %
EST. GFR  (AFRICAN AMERICAN): >60 ML/MIN/1.73 M^2
EST. GFR  (AFRICAN AMERICAN): >60 ML/MIN/1.73 M^2
EST. GFR  (NON AFRICAN AMERICAN): >60 ML/MIN/1.73 M^2
EST. GFR  (NON AFRICAN AMERICAN): >60 ML/MIN/1.73 M^2
GLUCOSE SERPL-MCNC: 111 MG/DL
GLUCOSE SERPL-MCNC: 115 MG/DL
HCT VFR BLD AUTO: 26.2 %
HGB BLD-MCNC: 9.4 G/DL
HYPOCHROMIA BLD QL SMEAR: ABNORMAL
IMM GRANULOCYTES # BLD AUTO: 0.03 K/UL
IMM GRANULOCYTES NFR BLD AUTO: 0.4 %
INR PPP: 2.8
LYMPHOCYTES # BLD AUTO: 2 K/UL
LYMPHOCYTES NFR BLD: 25.1 %
MAGNESIUM SERPL-MCNC: 1.8 MG/DL
MCH RBC QN AUTO: 31.1 PG
MCHC RBC AUTO-ENTMCNC: 35.9 G/DL
MCV RBC AUTO: 87 FL
MONOCYTES # BLD AUTO: 0.6 K/UL
MONOCYTES NFR BLD: 7.2 %
NEUTROPHILS # BLD AUTO: 5.3 K/UL
NEUTROPHILS NFR BLD: 66.9 %
NRBC BLD-RTO: 0 /100 WBC
PHOSPHATE SERPL-MCNC: 3.4 MG/DL
PLATELET # BLD AUTO: 48 K/UL
PLATELET BLD QL SMEAR: ABNORMAL
PMV BLD AUTO: ABNORMAL FL
POIKILOCYTOSIS BLD QL SMEAR: SLIGHT
POLYCHROMASIA BLD QL SMEAR: ABNORMAL
POTASSIUM SERPL-SCNC: 3 MMOL/L
POTASSIUM SERPL-SCNC: 3.4 MMOL/L
PROT SERPL-MCNC: 6.1 G/DL
PROT SERPL-MCNC: 6.1 G/DL
PROTHROMBIN TIME: 27.7 SEC
RBC # BLD AUTO: 3.02 M/UL
SODIUM SERPL-SCNC: 132 MMOL/L
SODIUM SERPL-SCNC: 133 MMOL/L
TARGETS BLD QL SMEAR: ABNORMAL
WBC # BLD AUTO: 7.97 K/UL

## 2017-12-03 PROCEDURE — 99233 SBSQ HOSP IP/OBS HIGH 50: CPT | Mod: ,,, | Performed by: HOSPITALIST

## 2017-12-03 PROCEDURE — 20600001 HC STEP DOWN PRIVATE ROOM

## 2017-12-03 PROCEDURE — 63600175 PHARM REV CODE 636 W HCPCS: Performed by: HOSPITALIST

## 2017-12-03 PROCEDURE — 36415 COLL VENOUS BLD VENIPUNCTURE: CPT

## 2017-12-03 PROCEDURE — 25000003 PHARM REV CODE 250: Performed by: NURSE PRACTITIONER

## 2017-12-03 PROCEDURE — 25000003 PHARM REV CODE 250: Performed by: STUDENT IN AN ORGANIZED HEALTH CARE EDUCATION/TRAINING PROGRAM

## 2017-12-03 PROCEDURE — 85610 PROTHROMBIN TIME: CPT

## 2017-12-03 PROCEDURE — 99231 SBSQ HOSP IP/OBS SF/LOW 25: CPT | Mod: ,,, | Performed by: INTERNAL MEDICINE

## 2017-12-03 PROCEDURE — 83735 ASSAY OF MAGNESIUM: CPT

## 2017-12-03 PROCEDURE — P9047 ALBUMIN (HUMAN), 25%, 50ML: HCPCS | Performed by: HOSPITALIST

## 2017-12-03 PROCEDURE — 85025 COMPLETE CBC W/AUTO DIFF WBC: CPT

## 2017-12-03 PROCEDURE — 80053 COMPREHEN METABOLIC PANEL: CPT

## 2017-12-03 PROCEDURE — 25000003 PHARM REV CODE 250: Performed by: HOSPITALIST

## 2017-12-03 PROCEDURE — 84100 ASSAY OF PHOSPHORUS: CPT

## 2017-12-03 RX ORDER — SIMETHICONE 80 MG
1 TABLET,CHEWABLE ORAL 3 TIMES DAILY PRN
Status: DISCONTINUED | OUTPATIENT
Start: 2017-12-03 | End: 2017-12-26

## 2017-12-03 RX ORDER — ONDANSETRON 2 MG/ML
4 INJECTION INTRAMUSCULAR; INTRAVENOUS EVERY 6 HOURS PRN
Status: DISCONTINUED | OUTPATIENT
Start: 2017-12-03 | End: 2017-12-26

## 2017-12-03 RX ADMIN — RIFAXIMIN 550 MG: 550 TABLET ORAL at 08:12

## 2017-12-03 RX ADMIN — ALBUMIN (HUMAN) 25 G: 12.5 SOLUTION INTRAVENOUS at 06:12

## 2017-12-03 RX ADMIN — CEFTRIAXONE SODIUM 1 G: 1 INJECTION, POWDER, FOR SOLUTION INTRAMUSCULAR; INTRAVENOUS at 12:12

## 2017-12-03 RX ADMIN — ALBUMIN (HUMAN) 25 G: 12.5 SOLUTION INTRAVENOUS at 09:12

## 2017-12-03 RX ADMIN — ALBUMIN (HUMAN) 25 G: 12.5 SOLUTION INTRAVENOUS at 02:12

## 2017-12-03 RX ADMIN — LACTULOSE 20 G: 20 SOLUTION ORAL at 09:12

## 2017-12-03 RX ADMIN — ONDANSETRON 4 MG: 2 INJECTION INTRAMUSCULAR; INTRAVENOUS at 11:12

## 2017-12-03 RX ADMIN — ALBUMIN (HUMAN) 25 G: 12.5 SOLUTION INTRAVENOUS at 12:12

## 2017-12-03 RX ADMIN — SIMETHICONE CHEW TAB 80 MG 80 MG: 80 TABLET ORAL at 01:12

## 2017-12-03 RX ADMIN — LACTULOSE 20 G: 20 SOLUTION ORAL at 06:12

## 2017-12-03 RX ADMIN — PREDNISONE 10 MG: 10 TABLET ORAL at 08:12

## 2017-12-03 RX ADMIN — RIFAXIMIN 550 MG: 550 TABLET ORAL at 09:12

## 2017-12-03 RX ADMIN — LACTULOSE 20 G: 20 SOLUTION ORAL at 02:12

## 2017-12-03 RX ADMIN — PANTOPRAZOLE SODIUM 40 MG: 40 TABLET, DELAYED RELEASE ORAL at 08:12

## 2017-12-03 NOTE — SUBJECTIVE & OBJECTIVE
Interval History:   Pt more awake today oriented to person, place, situation. Pt endorses worsening ascites w/ significant distention and some TTP w/ associated difficulty lying back. will continue w/ Liver transplant evaluation pending rpt C-scope    Current Facility-Administered Medications   Medication    albumin human 25% bottle 25 g    cefTRIAXone (ROCEPHIN) 1 g in dextrose 5 % 50 mL IVPB    lactulose 20 gram/30 mL solution Soln 20 g    ondansetron disintegrating tablet 4 mg    ondansetron injection 4 mg    pantoprazole EC tablet 40 mg    predniSONE tablet 10 mg    Followed by    [START ON 12/9/2017] predniSONE tablet 5 mg    rifAXIMin tablet 550 mg    simethicone chewable tablet 80 mg    sodium chloride 0.9% flush 3 mL    sodium chloride 0.9% flush 3 mL       Objective:     Vital Signs (Most Recent):  Temp: 99.2 °F (37.3 °C) (12/03/17 1216)  Pulse: 89 (12/03/17 1216)  Resp: 16 (12/03/17 1216)  BP: 112/65 (12/03/17 1216)  SpO2: 99 % (12/03/17 1216) Vital Signs (24h Range):  Temp:  [98.2 °F (36.8 °C)-99.2 °F (37.3 °C)] 99.2 °F (37.3 °C)  Pulse:  [82-95] 89  Resp:  [16-18] 16  SpO2:  [94 %-100 %] 99 %  BP: (101-120)/(57-70) 112/65     Weight: 69.6 kg (153 lb 7 oz) (11/23/17 2147)  Body mass index is 25.53 kg/m².    Physical Exam   Constitutional:   Confused ; appears very frail   HENT:   Head: Normocephalic.   Eyes: Scleral icterus is present.   Cardiovascular: Normal rate.    Pulmonary/Chest: Effort normal.   Abdominal: Soft.   + fluid wave ; nontender   Neurological:    very slow to respond and difficult to arouse but oriented to person, place, time    Vitals reviewed.      MELD-Na score: 32 at 12/3/2017  4:08 AM  MELD score: 30 at 12/3/2017  4:08 AM  Calculated from:  Serum Creatinine: 0.7 mg/dL (Rounded to 1) at 12/3/2017  4:08 AM  Serum Sodium: 132 mmol/L at 12/3/2017  4:08 AM  Total Bilirubin: 23.9 mg/dL at 12/3/2017  4:08 AM  INR(ratio): 2.8 at 12/3/2017  4:08 AM  Age: 52 years    Significant  Labs:  CBC:     Recent Labs  Lab 12/03/17 0408   WBC 7.97   RBC 3.02*   HGB 9.4*   HCT 26.2*   PLT 48*     CMP:     Recent Labs  Lab 12/03/17 0408   *   CALCIUM 9.3   ALBUMIN 4.2   PROT 6.1   *   K 3.4*   CO2 24   CL 96   BUN 6   CREATININE 0.7   ALKPHOS 162*   ALT 99*   AST 89*   BILITOT 23.9*     Coagulation:     Recent Labs  Lab 12/03/17 0408   INR 2.8*     MELD-Na score: 32 at 12/3/2017  4:08 AM  MELD score: 30 at 12/3/2017  4:08 AM  Calculated from:  Serum Creatinine: 0.7 mg/dL (Rounded to 1) at 12/3/2017  4:08 AM  Serum Sodium: 132 mmol/L at 12/3/2017  4:08 AM  Total Bilirubin: 23.9 mg/dL at 12/3/2017  4:08 AM  INR(ratio): 2.8 at 12/3/2017  4:08 AM  Age: 52 years    Significant Imaging:  Labs: Reviewed    Review of Systems

## 2017-12-03 NOTE — ASSESSMENT & PLAN NOTE
MELD-Na score: 32 at 12/3/2017  4:08 AM  MELD score: 30 at 12/3/2017  4:08 AM  Calculated from:  Serum Creatinine: 0.7 mg/dL (Rounded to 1) at 12/3/2017  4:08 AM  Serum Sodium: 132 mmol/L at 12/3/2017  4:08 AM  Total Bilirubin: 23.9 mg/dL at 12/3/2017  4:08 AM  INR(ratio): 2.8 at 12/3/2017  4:08 AM  Age: 52 years   - had half of her evaluation completed on last admission  - AS per Psychiatry patient is moderate risk, will proceed with Liver transplant evaluation  - EGD performed, esophageal varices banded, colonoscopy aborted due to poor prep   - repeat IR paracentesis due to abdominal pain

## 2017-12-03 NOTE — PT/OT/SLP PROGRESS
Physical Therapy      Marcie Blanca Bazan  MRN: [id number]    Patient not seen today secondary to  (Patient declined therapy secondary to not feeling well and vomiting.  Patient's sister staed she was up earlier and had just laid down). Will follow-up next scheduled visit.    Marc Laboy II, PTA

## 2017-12-03 NOTE — PLAN OF CARE
Problem: Patient Care Overview  Goal: Plan of Care Review  Outcome: Ongoing (interventions implemented as appropriate)  No acute events occurred overnight.  Pt complained of nausea.  PRN zofran given.  Pt received no relief from it and had one episode of emesis.  Pt repositioned in the chair with little relief.  No falls or injuries occurred overnight.  Will continue to monitor.

## 2017-12-03 NOTE — SUBJECTIVE & OBJECTIVE
Interval History: Patient seen and examined at bedside. Complaining of nausea, vomiting and abdominal pain overnight. Xray abdomen negative for obstruction.     Review of Systems   Constitutional: Negative for chills and fever.   HENT: Negative for rhinorrhea and sore throat.    Eyes: Negative for pain and discharge.   Respiratory: Negative for cough and shortness of breath.    Cardiovascular: Negative for chest pain and leg swelling.   Gastrointestinal: Positive for abdominal distention and diarrhea. Negative for abdominal pain, blood in stool, nausea and vomiting.   Endocrine: Negative for cold intolerance and heat intolerance.   Genitourinary: Negative for dysuria and flank pain.   Neurological: Negative for dizziness and numbness.   Psychiatric/Behavioral: Positive for confusion. Negative for behavioral problems.     Objective:     Vital Signs (Most Recent):  Temp: 99.2 °F (37.3 °C) (12/03/17 1216)  Pulse: 89 (12/03/17 1216)  Resp: 16 (12/03/17 1216)  BP: 112/65 (12/03/17 1216)  SpO2: 99 % (12/03/17 1216) Vital Signs (24h Range):  Temp:  [98.2 °F (36.8 °C)-99.2 °F (37.3 °C)] 99.2 °F (37.3 °C)  Pulse:  [82-95] 89  Resp:  [16-18] 16  SpO2:  [94 %-100 %] 99 %  BP: (101-120)/(57-70) 112/65     Weight: 69.6 kg (153 lb 7 oz)  Body mass index is 25.53 kg/m².    Intake/Output Summary (Last 24 hours) at 12/03/17 1303  Last data filed at 12/02/17 1325   Gross per 24 hour   Intake              500 ml   Output                0 ml   Net              500 ml      Physical Exam   Constitutional: She appears well-developed and well-nourished.   HENT:   Head: Normocephalic and atraumatic.   Eyes: Conjunctivae are normal. Pupils are equal, round, and reactive to light. Scleral icterus is present.   Neck: Normal range of motion. No thyromegaly present.   Cardiovascular: Normal rate, regular rhythm and normal heart sounds.    Pulmonary/Chest: Effort normal and breath sounds normal.   Abdominal: Soft. She exhibits distension. There  is no tenderness.   Musculoskeletal: Normal range of motion. She exhibits no edema.   Neurological: She is alert.   AAO times 2   Skin: No erythema. No pallor.       MELD-Na score: 32 at 12/3/2017  4:08 AM  MELD score: 30 at 12/3/2017  4:08 AM  Calculated from:  Serum Creatinine: 0.7 mg/dL (Rounded to 1) at 12/3/2017  4:08 AM  Serum Sodium: 132 mmol/L at 12/3/2017  4:08 AM  Total Bilirubin: 23.9 mg/dL at 12/3/2017  4:08 AM  INR(ratio): 2.8 at 12/3/2017  4:08 AM  Age: 52 years    Significant Labs:  CBC:    Recent Labs  Lab 12/02/17  0416 12/03/17  0408   WBC 5.89 7.97   HGB 8.3* 9.4*   HCT 23.1* 26.2*   PLT 45* 48*     CMP:    Recent Labs  Lab 12/02/17  0415 12/02/17  1607 12/03/17  0408   * 132* 132*   K 3.0* 3.4* 3.4*   CL 96 96 96   CO2 26 26 24   GLU 94 114* 111*   BUN 8 7 6   CREATININE 0.7 0.7 0.7   CALCIUM 9.1 9.5 9.3   PROT 5.4* 6.0 6.1   ALBUMIN 4.0 4.4 4.2   BILITOT 22.1* 23.0* 23.9*   ALKPHOS 142* 144* 162*   AST 90* 91* 89*   ALT 93* 92* 99*   ANIONGAP 10 10 12   EGFRNONAA >60.0 >60.0 >60.0     PTINR:    Recent Labs  Lab 12/02/17  0415 12/03/17  0408   INR 2.9* 2.8*       Significant Procedures:   Dobutamine Stress Test with Color Flow: Results for orders placed or performed during the hospital encounter of 10/19/17   Dobutamine Stress Test w/ Color Flow   Result Value Ref Range    EF 73 55 - 65    Diastolic Dysfunction No     Est. PA Systolic Pressure 21.32     Tricuspid Valve Regurgitation TRIVIAL

## 2017-12-03 NOTE — ASSESSMENT & PLAN NOTE
MELD-Na score: 32 at 12/3/2017  4:08 AM  MELD score: 30 at 12/3/2017  4:08 AM  Calculated from:  Serum Creatinine: 0.7 mg/dL (Rounded to 1) at 12/3/2017  4:08 AM  Serum Sodium: 132 mmol/L at 12/3/2017  4:08 AM  Total Bilirubin: 23.9 mg/dL at 12/3/2017  4:08 AM  INR(ratio): 2.8 at 12/3/2017  4:08 AM  Age: 52 years    Initially admitted to [facility] with decompensation. Has been attending inpatient etoh rehab.   Previously deferred due to lack of caregiver plan but this seems more stable now per our social work.      Plan:  - continue Prednisone Taper  - Pt seen by addiction psych ruled moderate risk w/ available support system   - evaluated by social work  - continue lactulose and rifaximin, titrate to 3-4 BMs/day    -Pt reports no BMs over last 24hrs    - recommend increase in lactulose dosing to 3-4 BMs/day  - C Diff (-)  - CMP and INR daily  - UCx + w/ >100,000 cfu/ml E Coli sensitive to rocephin, completed day 3 rocephin 11/27  - PETH test from 11/24 is Negative   - 0.8 L free water restriction in light of hyponatremia (please ensure enforcement of restriction)  - pt w/ results of + CMV PCR load, agree w/ ID recs, no evidence of end organ damage, monitor for now, will consider treatment down the line if transplant is pursued     - proceed w/ transplant workup   - EGD performed, esophageal varices banded, colonoscopy aborted due to poor prep    - Plan to rpt C-Scope Tuesday, full liquid diet today, clears tomorrow    - will likely need FFP Monday night to optimize for C-scope on Tuesday     - Pt is complaining of significant ascites distention w/ associated N/V, inability to lie on back   - recommend abdominal U/S and repeat therapeutic paracentesis

## 2017-12-03 NOTE — PLAN OF CARE
Problem: Patient Care Overview  Goal: Plan of Care Review  Outcome: Ongoing (interventions implemented as appropriate)  Patient had Xray of abdomen today. Patient is scheduled for paracentesis in IR tomorrow. Patient advanced to full liquid diet with boost.

## 2017-12-03 NOTE — PROGRESS NOTES
[facility]  Hepatology  Progress Note    Patient Name: Marcie Bazan  MRN: [id number]  Admission Date: 11/23/2017  Hospital Length of Stay: 10 days  Attending Provider: Surinder Madrigal MD   Primary Care Physician: ODILIA Wall  Principal Problem:Decompensated hepatic cirrhosis    Subjective:     Transplant status: Pre-transplant    HPI: 52 year old female with PMHx of ESLD 2/2 EtOH hepatitis and essential HTN who presents to [facility] as a direct transfer from [facility] for evaluation of decompensated liver cirrhosis. Patient presented to [facility] with generalized weakness and was admitted to  11/10 for hyponatremia seen on labs in GI office. Discharged on 11/15 and returned on 11/20 with 1 week worsening weakness, malaise, lethargy, and decreased appetite. Denied fever/chills, abdominal pain, bright red or black tarry stools, hematemesis or other abnormal bleeding, confusion or disorientation, changes in urination, light-headedness, headache, changes in vision, dyspnea, chest pain or palpitations. Labs demonstrated Na 116 and MELD 32; patient admitted for hyponatremia. Nephrology consulted, recommended fluid restriction , holding diuretics, and prednisode taper that started 11/23 (decreased from 40mg QD to 20mg Qd.) Patient transferred to [facility] for revaluation of liver disease. Of note, paracentesis performed 11/22, removed 2L, negative for SBP.     Follows with Dr. Souza for cirrhosis. Since early 09/2017, patient's liver failure progressing indicated by worsening clinical signs (abdominal distention, weakness/fatigue) and MELD. She had been evaluated by [facility] liver transplant committee 10/25/2017 and declined transplant candidacy 2/2 continued EtOH use and lack of solid caregiver support/plan.She has since been enrolled in in-patient substance abuse program at [facility] where she has remained since early November; per patient, her last EtOH beverage  "was 11/01/2017.  Per chart review on 11/22/17:     "Patient's case was discussed in liver selection committee today. Per committee discussion, the SW will call patient's daughter to discuss caregiver plan at this time. If the caregiver plan is adequate, patient will need to follow up with either/both addiction psych or SW regarding substance abuse and plan. May need to do this inpatient if patient is still admitted."        Denies history of GI bleed, SBP, renal disease, or other complications of cirrhosis. Liver biopsy 10/24 demonstrated stage 4 cirrhosis. Records reports EGD in August 2016 that showed small esophageal varices with severe gastritis. Colonoscopy 2015 with 2 polyps and internal hemorrhoids Started on prednisone 40mg QD on 10/25; was on spironolactone, has been held since 11/10 for hypoNa; on lactulose.    Interval History:   Pt more awake today oriented to person, place, situation. Pt endorses worsening ascites w/ significant distention and some TTP w/ associated difficulty lying back. will continue w/ Liver transplant evaluation pending rpt C-scope    Current Facility-Administered Medications   Medication    albumin human 25% bottle 25 g    cefTRIAXone (ROCEPHIN) 1 g in dextrose 5 % 50 mL IVPB    lactulose 20 gram/30 mL solution Soln 20 g    ondansetron disintegrating tablet 4 mg    ondansetron injection 4 mg    pantoprazole EC tablet 40 mg    predniSONE tablet 10 mg    Followed by    [START ON 12/9/2017] predniSONE tablet 5 mg    rifAXIMin tablet 550 mg    simethicone chewable tablet 80 mg    sodium chloride 0.9% flush 3 mL    sodium chloride 0.9% flush 3 mL       Objective:     Vital Signs (Most Recent):  Temp: 99.2 °F (37.3 °C) (12/03/17 1216)  Pulse: 89 (12/03/17 1216)  Resp: 16 (12/03/17 1216)  BP: 112/65 (12/03/17 1216)  SpO2: 99 % (12/03/17 1216) Vital Signs (24h Range):  Temp:  [98.2 °F (36.8 °C)-99.2 °F (37.3 °C)] 99.2 °F (37.3 °C)  Pulse:  [82-95] 89  Resp:  [16-18] 16  SpO2:  " [94 %-100 %] 99 %  BP: (101-120)/(57-70) 112/65     Weight: 69.6 kg (153 lb 7 oz) (11/23/17 2147)  Body mass index is 25.53 kg/m².    Physical Exam   Constitutional:   Confused ; appears very frail   HENT:   Head: Normocephalic.   Eyes: Scleral icterus is present.   Cardiovascular: Normal rate.    Pulmonary/Chest: Effort normal.   Abdominal: Soft.   + fluid wave ; nontender   Neurological:    very slow to respond and difficult to arouse but oriented to person, place, time    Vitals reviewed.      MELD-Na score: 32 at 12/3/2017  4:08 AM  MELD score: 30 at 12/3/2017  4:08 AM  Calculated from:  Serum Creatinine: 0.7 mg/dL (Rounded to 1) at 12/3/2017  4:08 AM  Serum Sodium: 132 mmol/L at 12/3/2017  4:08 AM  Total Bilirubin: 23.9 mg/dL at 12/3/2017  4:08 AM  INR(ratio): 2.8 at 12/3/2017  4:08 AM  Age: 52 years    Significant Labs:  CBC:     Recent Labs  Lab 12/03/17 0408   WBC 7.97   RBC 3.02*   HGB 9.4*   HCT 26.2*   PLT 48*     CMP:     Recent Labs  Lab 12/03/17 0408   *   CALCIUM 9.3   ALBUMIN 4.2   PROT 6.1   *   K 3.4*   CO2 24   CL 96   BUN 6   CREATININE 0.7   ALKPHOS 162*   ALT 99*   AST 89*   BILITOT 23.9*     Coagulation:     Recent Labs  Lab 12/03/17 0408   INR 2.8*     MELD-Na score: 32 at 12/3/2017  4:08 AM  MELD score: 30 at 12/3/2017  4:08 AM  Calculated from:  Serum Creatinine: 0.7 mg/dL (Rounded to 1) at 12/3/2017  4:08 AM  Serum Sodium: 132 mmol/L at 12/3/2017  4:08 AM  Total Bilirubin: 23.9 mg/dL at 12/3/2017  4:08 AM  INR(ratio): 2.8 at 12/3/2017  4:08 AM  Age: 52 years    Significant Imaging:  Labs: Reviewed    Review of Systems      Assessment/Plan:     * Decompensated hepatic cirrhosis      MELD-Na score: 32 at 12/3/2017  4:08 AM  MELD score: 30 at 12/3/2017  4:08 AM  Calculated from:  Serum Creatinine: 0.7 mg/dL (Rounded to 1) at 12/3/2017  4:08 AM  Serum Sodium: 132 mmol/L at 12/3/2017  4:08 AM  Total Bilirubin: 23.9 mg/dL at 12/3/2017  4:08 AM  INR(ratio): 2.8 at 12/3/2017  4:08  AM  Age: 52 years    Initially admitted to [facility] with decompensation. Has been attending inpatient etoh rehab.   Previously deferred due to lack of caregiver plan but this seems more stable now per our social work.      Plan:  - continue Prednisone Taper  - Pt seen by addiction psych ruled moderate risk w/ available support system   - evaluated by social work  - continue lactulose and rifaximin, titrate to 3-4 BMs/day    -Pt reports no BMs over last 24hrs    - recommend increase in lactulose dosing to 3-4 BMs/day  - C Diff (-)  - CMP and INR daily  - UCx + w/ >100,000 cfu/ml E Coli sensitive to rocephin, completed day 3 rocephin 11/27  - PETH test from 11/24 is Negative   - 0.8 L free water restriction in light of hyponatremia (please ensure enforcement of restriction)  - pt w/ results of + CMV PCR load, agree w/ ID recs, no evidence of end organ damage, monitor for now, will consider treatment down the line if transplant is pursued     - proceed w/ transplant workup   - EGD performed, esophageal varices banded, colonoscopy aborted due to poor prep    - Plan to rpt C-Scope Tuesday, full liquid diet today, clears tomorrow    - will likely need FFP Monday night to optimize for C-scope on Tuesday     - Pt is complaining of significant ascites distention w/ associated N/V, inability to lie on back   - recommend abdominal U/S and repeat therapeutic paracentesis                      Hyponatremia    Improving w/ free water restriction             Thank you for your consult. I will follow-up with patient. Please contact us if you have any additional questions.    Magan Lazcano MD  Hepatology  [facility]

## 2017-12-03 NOTE — PROGRESS NOTES
[facility] Hospital Medicine  Progress Note    Patient Name: Marcie Bazan  MRN: [id number]  Patient Class: IP- Inpatient   Admission Date: 11/23/2017  Length of Stay: 10 days  Expected Discharge Date: 12/6/2017  Attending Physician: Surinder Madrigal MD  Primary Care Provider: Milton Ferrer, [facility] Medicine Team: [facility] HOSP MED A Surinder Madrigal MD  Principal Problem:Decompensated hepatic cirrhosis    Subjective:     HPI:   52 year old female with PMHx of 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 Patient presented to [facility] with generalized weakness. Onset two months ago, progressively worsening. Was admitted to  11/10 for hyponatremia seen on labs in GI office. Discharged on 11/15 and returned on 11/20 with 1 week worsening weakness, malaise, lethargy, and decreased appetite. Denied fever/chills, abdominal pain, bright red or black tarry stools, hematemesis or other abnormal bleeding, confusion or disorientation, changes in urination, light-headedness, headache, changes in vision, dyspnea, chest pain or palpitations. Labs demonstrated Na 116 and MELD 32; patient admitted for hyponatremia. Nephrology consulted, recommended fluid restriction , holding diuretics, and prednisode taper that started 11/23 (decreased from 40mg QD to 20mg Qd.) Patient transferred to [facility] for revaluation of liver disease. Of note, paracentesis performed 11/22, removed 2L, negative for SBP.    Follows with Dr. Souza for cirrhosis. Since early 09/2017, patient's liver failure progressing indicated by worsening clinical signs (abdominal distention, weakness/fatigue) and MELD. In mid-October, patient's PETH demonstrated EtOH use and she was denied candidacy for liver transplant. She was then enrolled in in-patient substance abuse program at [facility]  where she has remained since early November; per patient, her last EtOH beverage was 11/01/2017. Denies history of GI bleed, SBP, renal disease, or other complications of cirrhosis. Liver biopsy 10/24 demonstrated stage 4 cirrhosis. Records reports EGD in August 2016 that showed small esophageal varices with severe gastritis. Colonoscopy 2015 with 2 polyps and internal hemorrhoids Started on prednisone 40mg QD on 10/25; was on spironolactone, has been held since 11/10 for hypoNa; on lactulose.    Hospital Course:  Mental status improving with hepatic encephalopathy treatment. Started empiric ceftriaxone given leukocytosis in immunocompromised state. CMV PCR positive overnight, for which ID was consulted, recommended repeating titers in 1 week. Seen by Psychiatry and  who determined she was moderate risk for Liver transplant. Will proceed with EGD/Cscope and mammogram after FFP. Colonoscopy not performed due to poor prep, EGD revealed large varices which were banded. IR paracentesis ordered after patient started complaining of abdominal pain.       Interval History: Patient seen and examined at bedside. Complaining of nausea, vomiting and abdominal pain overnight. Xray abdomen negative for obstruction.     Review of Systems   Constitutional: Negative for chills and fever.   HENT: Negative for rhinorrhea and sore throat.    Eyes: Negative for pain and discharge.   Respiratory: Negative for cough and shortness of breath.    Cardiovascular: Negative for chest pain and leg swelling.   Gastrointestinal: Positive for abdominal distention and diarrhea. Negative for abdominal pain, blood in stool, nausea and vomiting.   Endocrine: Negative for cold intolerance and heat intolerance.   Genitourinary: Negative for dysuria and flank pain.   Neurological: Negative for dizziness and numbness.   Psychiatric/Behavioral: Positive for confusion. Negative for behavioral problems.     Objective:     Vital Signs (Most  Recent):  Temp: 99.2 °F (37.3 °C) (12/03/17 1216)  Pulse: 89 (12/03/17 1216)  Resp: 16 (12/03/17 1216)  BP: 112/65 (12/03/17 1216)  SpO2: 99 % (12/03/17 1216) Vital Signs (24h Range):  Temp:  [98.2 °F (36.8 °C)-99.2 °F (37.3 °C)] 99.2 °F (37.3 °C)  Pulse:  [82-95] 89  Resp:  [16-18] 16  SpO2:  [94 %-100 %] 99 %  BP: (101-120)/(57-70) 112/65     Weight: 69.6 kg (153 lb 7 oz)  Body mass index is 25.53 kg/m².    Intake/Output Summary (Last 24 hours) at 12/03/17 1303  Last data filed at 12/02/17 1325   Gross per 24 hour   Intake              500 ml   Output                0 ml   Net              500 ml      Physical Exam   Constitutional: She appears well-developed and well-nourished.   HENT:   Head: Normocephalic and atraumatic.   Eyes: Conjunctivae are normal. Pupils are equal, round, and reactive to light. Scleral icterus is present.   Neck: Normal range of motion. No thyromegaly present.   Cardiovascular: Normal rate, regular rhythm and normal heart sounds.    Pulmonary/Chest: Effort normal and breath sounds normal.   Abdominal: Soft. She exhibits distension. There is no tenderness.   Musculoskeletal: Normal range of motion. She exhibits no edema.   Neurological: She is alert.   AAO times 2   Skin: No erythema. No pallor.       MELD-Na score: 32 at 12/3/2017  4:08 AM  MELD score: 30 at 12/3/2017  4:08 AM  Calculated from:  Serum Creatinine: 0.7 mg/dL (Rounded to 1) at 12/3/2017  4:08 AM  Serum Sodium: 132 mmol/L at 12/3/2017  4:08 AM  Total Bilirubin: 23.9 mg/dL at 12/3/2017  4:08 AM  INR(ratio): 2.8 at 12/3/2017  4:08 AM  Age: 52 years    Significant Labs:  CBC:    Recent Labs  Lab 12/02/17  0416 12/03/17  0408   WBC 5.89 7.97   HGB 8.3* 9.4*   HCT 23.1* 26.2*   PLT 45* 48*     CMP:    Recent Labs  Lab 12/02/17  0415 12/02/17  1607 12/03/17  0408   * 132* 132*   K 3.0* 3.4* 3.4*   CL 96 96 96   CO2 26 26 24   GLU 94 114* 111*   BUN 8 7 6   CREATININE 0.7 0.7 0.7   CALCIUM 9.1 9.5 9.3   PROT 5.4* 6.0 6.1    ALBUMIN 4.0 4.4 4.2   BILITOT 22.1* 23.0* 23.9*   ALKPHOS 142* 144* 162*   AST 90* 91* 89*   ALT 93* 92* 99*   ANIONGAP 10 10 12   EGFRNONAA >60.0 >60.0 >60.0     PTINR:    Recent Labs  Lab 12/02/17  0415 12/03/17  0408   INR 2.9* 2.8*       Significant Procedures:   Dobutamine Stress Test with Color Flow: Results for orders placed or performed during the hospital encounter of 10/19/17   Dobutamine Stress Test w/ Color Flow   Result Value Ref Range    EF 73 55 - 65    Diastolic Dysfunction No     Est. PA Systolic Pressure 21.32     Tricuspid Valve Regurgitation TRIVIAL             Assessment / Plan:     * Decompensated hepatic cirrhosis    MELD-Na score: 32 at 12/3/2017  4:08 AM  MELD score: 30 at 12/3/2017  4:08 AM  Calculated from:  Serum Creatinine: 0.7 mg/dL (Rounded to 1) at 12/3/2017  4:08 AM  Serum Sodium: 132 mmol/L at 12/3/2017  4:08 AM  Total Bilirubin: 23.9 mg/dL at 12/3/2017  4:08 AM  INR(ratio): 2.8 at 12/3/2017  4:08 AM  Age: 52 years   - had half of her evaluation completed on last admission  - AS per Psychiatry patient is moderate risk, will proceed with Liver transplant evaluation  - EGD performed, esophageal varices banded, colonoscopy aborted due to poor prep   - repeat IR paracentesis due to abdominal pain         Immunosuppressed status    On chronic prednisone for alcoholic hepatitis; currently 15 mg, would go down by 5 weekly, next change would be 10 mg this saturday          Coagulopathy    - vitamin k for 3 days  - transfuse 2 units of FFP prior to EGD/colonoscopy on 12/01/17          Alcoholic cirrhosis of liver with ascites    - see decompensated cirrhosis           Cytomegalovirus (CMV) viremia    -- ID evaluated and do not feel it needs to be treated        Thrombocytopenia    -- monitor           Abnormal liver enzymes    -- Stable  -- Etiology liver failure; see that section for more details          Alcoholic hepatitis with ascites- decompensated with elevated MELD    - cont  tapering prednisone, currently at 15 mg        Hyponatremia    - on 800 cc fluid restriction; on albumin  - 116-->118-->120-->122-->123-->126-->125-->127-->131        End stage liver disease              HCC (hepatocellular carcinoma)    AFP 9; seen on CT on 10/19, 1 cm focus, monitor for now          Hepatic encephalopathy    -- Improving, though not yet returned to baseline, AAO times 3; put out 1 L in flexi-seal  -- Continue lactulose and rifaximin          Severe protein-calorie malnutrition    PAB, boost and dietary consult          Ascites    -- Holding diuretics at present, s/p 2L removed on 11/22 with testing negative for SBP  -- No abdominal pain today              Essential hypertension    - Stable  - Holding home lisinopril in setting of decompensated liver disease          Anemia of chronic disease    - monitor daily                VTE Risk Mitigation         Ordered     Place sequential compression device  Until discontinued      11/24/17 0734     Place FABIOLA hose  Until discontinued      11/23/17 2305     Medium Risk of VTE  Once      11/23/17 2305           Discharge Disposition: pending Liver committee evaluation     Time taken to evaluate, plan, and coordinate patient care: 35 minutes.    Surinder Madrigal MD  Department of Hospital Medicine  [facility]

## 2017-12-04 ENCOUNTER — ANESTHESIA EVENT (OUTPATIENT)
Dept: ENDOSCOPY | Facility: HOSPITAL | Age: 52
DRG: 005 | End: 2017-12-04
Payer: COMMERCIAL

## 2017-12-04 LAB
ABO + RH BLD: NORMAL
ALBUMIN SERPL BCP-MCNC: 4.1 G/DL
ALBUMIN SERPL BCP-MCNC: 4.1 G/DL
ALP SERPL-CCNC: 144 U/L
ALP SERPL-CCNC: 158 U/L
ALT SERPL W/O P-5'-P-CCNC: 79 U/L
ALT SERPL W/O P-5'-P-CCNC: 80 U/L
ANION GAP SERPL CALC-SCNC: 10 MMOL/L
ANION GAP SERPL CALC-SCNC: 11 MMOL/L
ANISOCYTOSIS BLD QL SMEAR: ABNORMAL
AST SERPL-CCNC: 75 U/L
AST SERPL-CCNC: 78 U/L
BASOPHILS # BLD AUTO: 0.03 K/UL
BASOPHILS NFR BLD: 0.3 %
BILIRUB SERPL-MCNC: 23.6 MG/DL
BILIRUB SERPL-MCNC: 24.9 MG/DL
BLD GP AB SCN CELLS X3 SERPL QL: NORMAL
BUN SERPL-MCNC: 8 MG/DL
BUN SERPL-MCNC: 9 MG/DL
BURR CELLS BLD QL SMEAR: ABNORMAL
CALCIUM SERPL-MCNC: 9.1 MG/DL
CALCIUM SERPL-MCNC: 9.2 MG/DL
CHLORIDE SERPL-SCNC: 96 MMOL/L
CHLORIDE SERPL-SCNC: 97 MMOL/L
CO2 SERPL-SCNC: 25 MMOL/L
CO2 SERPL-SCNC: 26 MMOL/L
CREAT SERPL-MCNC: 0.7 MG/DL
CREAT SERPL-MCNC: 0.8 MG/DL
DIFFERENTIAL METHOD: ABNORMAL
EOSINOPHIL # BLD AUTO: 0 K/UL
EOSINOPHIL NFR BLD: 0.4 %
ERYTHROCYTE [DISTWIDTH] IN BLOOD BY AUTOMATED COUNT: 23.3 %
EST. GFR  (AFRICAN AMERICAN): >60 ML/MIN/1.73 M^2
EST. GFR  (AFRICAN AMERICAN): >60 ML/MIN/1.73 M^2
EST. GFR  (NON AFRICAN AMERICAN): >60 ML/MIN/1.73 M^2
EST. GFR  (NON AFRICAN AMERICAN): >60 ML/MIN/1.73 M^2
GLUCOSE SERPL-MCNC: 114 MG/DL
GLUCOSE SERPL-MCNC: 87 MG/DL
HCT VFR BLD AUTO: 23.2 %
HGB BLD-MCNC: 8.3 G/DL
HYPOCHROMIA BLD QL SMEAR: ABNORMAL
IMM GRANULOCYTES # BLD AUTO: 0.05 K/UL
IMM GRANULOCYTES NFR BLD AUTO: 0.5 %
INR PPP: 3.1
LYMPHOCYTES # BLD AUTO: 3.1 K/UL
LYMPHOCYTES NFR BLD: 31.6 %
MAGNESIUM SERPL-MCNC: 1.8 MG/DL
MCH RBC QN AUTO: 30.3 PG
MCHC RBC AUTO-ENTMCNC: 35.8 G/DL
MCV RBC AUTO: 85 FL
MONOCYTES # BLD AUTO: 0.7 K/UL
MONOCYTES NFR BLD: 7.1 %
NEUTROPHILS # BLD AUTO: 5.9 K/UL
NEUTROPHILS NFR BLD: 60.1 %
NRBC BLD-RTO: 0 /100 WBC
PHOSPHATE SERPL-MCNC: 2.5 MG/DL
PLATELET # BLD AUTO: 44 K/UL
PLATELET BLD QL SMEAR: ABNORMAL
PMV BLD AUTO: ABNORMAL FL
POIKILOCYTOSIS BLD QL SMEAR: ABNORMAL
POLYCHROMASIA BLD QL SMEAR: ABNORMAL
POTASSIUM SERPL-SCNC: 3.2 MMOL/L
POTASSIUM SERPL-SCNC: 3.2 MMOL/L
PROT SERPL-MCNC: 5.8 G/DL
PROT SERPL-MCNC: 6 G/DL
PROTHROMBIN TIME: 30.9 SEC
RBC # BLD AUTO: 2.74 M/UL
SODIUM SERPL-SCNC: 132 MMOL/L
SODIUM SERPL-SCNC: 133 MMOL/L
TARGETS BLD QL SMEAR: ABNORMAL
WBC # BLD AUTO: 9.78 K/UL

## 2017-12-04 PROCEDURE — 97530 THERAPEUTIC ACTIVITIES: CPT

## 2017-12-04 PROCEDURE — 25000003 PHARM REV CODE 250: Performed by: INTERNAL MEDICINE

## 2017-12-04 PROCEDURE — 83735 ASSAY OF MAGNESIUM: CPT

## 2017-12-04 PROCEDURE — 80053 COMPREHEN METABOLIC PANEL: CPT | Mod: 91

## 2017-12-04 PROCEDURE — 25000003 PHARM REV CODE 250: Performed by: STUDENT IN AN ORGANIZED HEALTH CARE EDUCATION/TRAINING PROGRAM

## 2017-12-04 PROCEDURE — 63600175 PHARM REV CODE 636 W HCPCS: Performed by: HOSPITALIST

## 2017-12-04 PROCEDURE — 84100 ASSAY OF PHOSPHORUS: CPT

## 2017-12-04 PROCEDURE — P9017 PLASMA 1 DONOR FRZ W/IN 8 HR: HCPCS

## 2017-12-04 PROCEDURE — 25000003 PHARM REV CODE 250: Performed by: HOSPITALIST

## 2017-12-04 PROCEDURE — 20600001 HC STEP DOWN PRIVATE ROOM

## 2017-12-04 PROCEDURE — 36415 COLL VENOUS BLD VENIPUNCTURE: CPT

## 2017-12-04 PROCEDURE — 85610 PROTHROMBIN TIME: CPT

## 2017-12-04 PROCEDURE — 85025 COMPLETE CBC W/AUTO DIFF WBC: CPT

## 2017-12-04 PROCEDURE — 99233 SBSQ HOSP IP/OBS HIGH 50: CPT | Mod: ,,, | Performed by: HOSPITALIST

## 2017-12-04 PROCEDURE — 99231 SBSQ HOSP IP/OBS SF/LOW 25: CPT | Mod: ,,, | Performed by: INTERNAL MEDICINE

## 2017-12-04 PROCEDURE — P9047 ALBUMIN (HUMAN), 25%, 50ML: HCPCS | Performed by: HOSPITALIST

## 2017-12-04 PROCEDURE — 86850 RBC ANTIBODY SCREEN: CPT

## 2017-12-04 RX ORDER — HYDROCODONE BITARTRATE AND ACETAMINOPHEN 500; 5 MG/1; MG/1
TABLET ORAL
Status: DISCONTINUED | OUTPATIENT
Start: 2017-12-04 | End: 2017-12-11

## 2017-12-04 RX ORDER — POLYETHYLENE GLYCOL 3350, SODIUM SULFATE ANHYDROUS, SODIUM BICARBONATE, SODIUM CHLORIDE, POTASSIUM CHLORIDE 236; 22.74; 6.74; 5.86; 2.97 G/4L; G/4L; G/4L; G/4L; G/4L
4000 POWDER, FOR SOLUTION ORAL ONCE
Status: COMPLETED | OUTPATIENT
Start: 2017-12-04 | End: 2017-12-04

## 2017-12-04 RX ADMIN — ALBUMIN (HUMAN) 25 G: 12.5 SOLUTION INTRAVENOUS at 06:12

## 2017-12-04 RX ADMIN — POTASSIUM PHOSPHATE, MONOBASIC AND POTASSIUM PHOSPHATE, DIBASIC 30 MMOL: 224; 236 INJECTION, SOLUTION, CONCENTRATE INTRAVENOUS at 09:12

## 2017-12-04 RX ADMIN — LACTULOSE 20 G: 20 SOLUTION ORAL at 03:12

## 2017-12-04 RX ADMIN — PREDNISONE 10 MG: 10 TABLET ORAL at 09:12

## 2017-12-04 RX ADMIN — RIFAXIMIN 550 MG: 550 TABLET ORAL at 09:12

## 2017-12-04 RX ADMIN — PANTOPRAZOLE SODIUM 40 MG: 40 TABLET, DELAYED RELEASE ORAL at 09:12

## 2017-12-04 RX ADMIN — POLYETHYLENE GLYCOL 3350, SODIUM SULFATE ANHYDROUS, SODIUM BICARBONATE, SODIUM CHLORIDE, POTASSIUM CHLORIDE 4000 ML: 236; 22.74; 6.74; 5.86; 2.97 POWDER, FOR SOLUTION ORAL at 06:12

## 2017-12-04 RX ADMIN — LACTULOSE 20 G: 20 SOLUTION ORAL at 06:12

## 2017-12-04 RX ADMIN — ONDANSETRON 4 MG: 2 INJECTION INTRAMUSCULAR; INTRAVENOUS at 09:12

## 2017-12-04 RX ADMIN — CEFTRIAXONE SODIUM 1 G: 1 INJECTION, POWDER, FOR SOLUTION INTRAMUSCULAR; INTRAVENOUS at 12:12

## 2017-12-04 NOTE — ANESTHESIA PREPROCEDURE EVALUATION
[facility]  Anesthesia Pre-Operative Evaluation         Patient Name: Marcie Bazan  YOB: 1965  MRN: [id number]    SUBJECTIVE:     Pre-operative evaluation for Procedure(s) (LRB):  COLONOSCOPY (N/A)     12/04/2017    Marcie Bazan is a 52 y.o. female w/ a significant PMHx of HTN, ETOH abuse, ELSD with cirrhosis admitted for decompensated liver failure, encephalopathy and hyponatremia undergoing work up for liver Tx    Patient now presents for the above procedure(s).      LDA:        Peripheral IV - Single Lumen 12/03/17 1231 Left Wrist (Active)   Site Assessment Clean;Dry;Intact 12/3/2017  8:56 PM   Line Status Saline locked 12/3/2017  8:56 PM   Dressing Status Clean;Dry;Intact 12/3/2017  8:56 PM   Site Change Due 12/07/17 12/3/2017  8:56 PM   Reason Not Rotated Not due 12/3/2017  8:56 PM   Number of days: 0       Prev airway: None documented.    Drips: None documented.       Patient Active Problem List   Diagnosis    Allergic rhinitis    Anemia of chronic disease    Decompensated hepatic cirrhosis    Erosive esophagitis    Essential hypertension    Flatulence/gas pain/belching    Hemoptysis    History of abnormal cervical Pap smear    History of Helicobacter pylori infection    Lipoma    Low back pain    Multinodular thyroid    NAFLD (nonalcoholic fatty liver disease)    Obesity (BMI 30-39.9)    Seasonal allergic rhinitis    Jaundice    Ascites    Hypokalemia    Hypomagnesemia    Elevated AFP    Moderate protein-calorie malnutrition    Severe protein-calorie malnutrition    Hepatic encephalopathy    HCC (hepatocellular carcinoma)    End stage liver disease    Hyponatremia    Alcoholic hepatitis with ascites- decompensated with elevated MELD    Abnormal liver enzymes    Thrombocytopenia    Cytomegalovirus (CMV) viremia    Alcoholic cirrhosis of liver with ascites    Coagulopathy    Alcohol use disorder, severe, dependence    Immunosuppressed  status    Transplant donor evaluation       Review of patient's allergies indicates:   Allergen Reactions    Ferrous sulfate Other (See Comments)     Patient states the pill makes her sick. She stated she would rather have a shot       Current Inpatient Medications:   albumin human 25%  25 g Intravenous TID    cefTRIAXone (ROCEPHIN) IVPB  1 g Intravenous Q12H    lactulose  20 g Oral TID    pantoprazole  40 mg Oral Daily    polyethylene glycol  4,000 mL Oral Once    potassium phosphate IVPB  30 mmol Intravenous Once    predniSONE  10 mg Oral Daily    Followed by    [START ON 12/9/2017] predniSONE  5 mg Oral Daily    rifAXImin  550 mg Oral BID       No current facility-administered medications on file prior to encounter.      Current Outpatient Prescriptions on File Prior to Encounter   Medication Sig Dispense Refill    cetirizine (ZYRTEC) 10 MG tablet Take 10 mg by mouth once daily.      lactulose (CHRONULAC) 10 gram/15 mL solution Take 45 ml by mouth four times daily  2    omeprazole (PRILOSEC) 40 MG capsule Take 40 mg by mouth once daily.      ondansetron (ZOFRAN-ODT) 4 MG TbDL Take 1 tablet (4 mg total) by mouth every 8 (eight) hours as needed (nausea and vomiting). 12 tablet 0    potassium chloride SA (K-DUR,KLOR-CON) 20 MEQ tablet Taking only when taking lasix 14 tablet 0    lisinopril (PRINIVIL,ZESTRIL) 5 MG tablet Take 1 tablet by mouth only as needed for high blood pressure      spironolactone (ALDACTONE) 100 MG tablet Hold until follow-up with GI 14 tablet 0       Past Surgical History:   Procedure Laterality Date    BREAST SURGERY      CHOLECYSTECTOMY      HYSTERECTOMY         Social History     Social History    Marital status: Single     Spouse name: N/A    Number of children: N/A    Years of education: N/A     Occupational History    Not on file.     Social History Main Topics    Smoking status: Never Smoker    Smokeless tobacco: Never Used    Alcohol use Yes      Comment:  Currently admitted to inpatient rehab    Drug use: No    Sexual activity: Not Currently     Other Topics Concern    Not on file     Social History Narrative    No narrative on file       OBJECTIVE:     Vital Signs Range (Last 24H):  Temp:  [36.9 °C (98.4 °F)-37.6 °C (99.7 °F)]   Pulse:  [89-99]   Resp:  [16-18]   BP: ()/(52-65)   SpO2:  [93 %-100 %]       CBC:   Recent Labs      17   0408  17   0414   WBC  7.97  9.78   RBC  3.02*  2.74*   HGB  9.4*  8.3*   HCT  26.2*  23.2*   PLT  48*  44*   MCV  87  85   MCH  31.1*  30.3   MCHC  35.9  35.8       CMP:   Recent Labs      17   0408  17   1604  17   0414   NA  132*  133*  133*   K  3.4*  3.0*  3.2*   CL  96  96  97   CO2  24  25  25   BUN  6  7  8   CREATININE  0.7  0.8  0.7   GLU  111*  115*  87   MG  1.8   --   1.8   PHOS  3.4   --   2.5*   CALCIUM  9.3  9.4  9.1   ALBUMIN  4.2  4.4  4.1   PROT  6.1  6.1  5.8*   ALKPHOS  162*  140*  144*   ALT  99*  89*  79*   AST  89*  82*  75*   BILITOT  23.9*  23.3*  23.6*       INR:  Recent Labs      17   0415  17   0408  17   0414   INR  2.9*  2.8*  3.1*       Diagnostic Studies: No relevant studies.    EK17  Vent. Rate : 084 BPM     Atrial Rate : 084 BPM     P-R Int : 118 ms          QRS Dur : 074 ms      QT Int : 392 ms       P-R-T Axes : 038 -03 023 degrees     QTc Int : 463 ms    Normal sinus rhythm  Possible Left atrial enlargement  Septal infarct ,age undetermined    2D ECHO:  No results found for this or any previous visit.      ASSESSMENT/PLAN:         Anesthesia Evaluation    I have reviewed the Patient Summary Reports.     I have reviewed the Medications.   Prednisone    Review of Systems  Anesthesia Hx:  History of prior surgery of interest to airway management or planning:   Social:  Non-Smoker, Alcohol Use    Cardiovascular:   Hypertension    Pulmonary:  Pulmonary Normal    Hepatic/GI:   Liver Disease, ESLD decompensated   Neurological:   Acute  encephalopathy   Psych:   ETOH abuse         Physical Exam  General:  Well nourished    Airway/Jaw/Neck:  Airway Findings: Mouth Opening: Normal Tongue: Normal  General Airway Assessment: Adult  Mallampati: II  TM Distance: Normal, at least 6 cm     Eyes/Ears/Nose:  Eyes/Ears/Nose Findings: - jaundiced     Dental:  DENTAL FINDINGS: Normal   Chest/Lungs:  Chest/Lungs Clear    Heart/Vascular:  Heart Findings: Normal    Abdomen:  Abdomen Findings:  Ascites       Mental Status:  Mental Status Findings: Normal        Anesthesia Plan  Type of Anesthesia, risks & benefits discussed:  Anesthesia Type:  general, MAC  Patient's Preference:   Intra-op Monitoring Plan:   Intra-op Monitoring Plan Comments:   Post Op Pain Control Plan: per primary service following discharge from PACU  Post Op Pain Control Plan Comments:   Induction:    Beta Blocker:  Patient is not currently on a Beta-Blocker (No further documentation required).       Informed Consent: Patient understands risks and agrees with Anesthesia plan.  Questions answered. Anesthesia consent signed with patient.  ASA Score: 3     Day of Surgery Review of History & Physical:    H&P update referred to the provider.     Anesthesia Plan Notes: Most recent Na 131        Ready For Surgery From Anesthesia Perspective.

## 2017-12-04 NOTE — PLAN OF CARE
Problem: Occupational Therapy Goal  Goal: Occupational Therapy Goal  Goals to be met by: 12/10/17     Patient will increase functional independence with ADLs by performing:    UE Dressing with Set-up Assistance and Supervision.  LE Dressing with Minimal Assistance.  Grooming while standing at sink with Contact Guard Assistance.  Toileting from toilet with Stand-by Assistance for hygiene and clothing management.   Supine to sit with Supervision.  Stand pivot transfers with Contact Guard Assistance.  Bathing while EOB with Setup Assistance and Supervision.  Toilet transfer to toilet with Contact Guard Assistance.      Outcome: Ongoing (interventions implemented as appropriate)  Goals remain appropriate. Cont POC.    JA Potts  12/4/2017  Pager: [phone]

## 2017-12-04 NOTE — PT/OT/SLP PROGRESS
Physical Therapy      Patient Name:  Marcie Bazan   MRN:  [id number]    Patient not seen today secondary to being off floor at mammogram. Will follow-up at next earliest appointment.    Khoi Melendrez, PT

## 2017-12-04 NOTE — PT/OT/SLP PROGRESS
Occupational Therapy   Treatment    Name: Marcie Bazan  MRN: [id number]  Admitting Diagnosis:  Decompensated hepatic cirrhosis  3 Days Post-Op    Recommendations:     Discharge Recommendations: Rehab, pt improving well and able to tolerate 3 hours of therapy, willing to participate, and would benefit from increased frequency as pt is progressing well in acute care.  Discharge Equipment Recommendations:  bedside commode, walker, rolling, shower chair  Barriers to discharge:  Decreased caregiver support    Subjective     Communicated with: RN prior to session.  Pain/Comfort:  · Pain Rating 1: 0/10  · Pain Rating Post-Intervention 1: 0/10    Objective:     Patient found with: bowel management system    Occupational Performance:    Bed Mobility:    · NT, sitting EOB upon arrival    Functional Mobility/Transfers:  · Patient completed Sit <> Stand Transfer with contact guard assistance and minimum assistance  with  no assistive device     Activities of Daily Living:  · Feeding:  modified independence able to feed self  · Grooming: supervision setup A    Patient left sitting EOB with all lines intact, call button in reach and friend present    [facility] 6 Click:  [facility] Total Score: 19    Treatment & Education:  Pt able to feed herself and brush her teeth with Setup S. Pt able to stand with CGA. Reviewed therex (performed 1x3 of the following): leg extensions and arm raises to be performed 3x10 5 times per day. Pt also to eat all meals EOB or UIC.  Education:    Assessment:     Marcie Bazan is a 52 y.o. female with a medical diagnosis of Decompensated hepatic cirrhosis.  She presents with improved mobility and self care skills. Pt understands and is able to perform therex with Supervision. Pt progressing well with mobility and grooming/toileting tasks.    Performance deficits affecting function are weakness, impaired endurance, impaired self care skills, impaired functional mobilty, gait instability, impaired balance.     Rehab Prognosis:  Good; patient would benefit from acute skilled OT services to address these deficits and reach maximum level of function.       Plan:     Patient to be seen 3 x/week to address the above listed problems via self-care/home management, therapeutic activities, therapeutic exercises, cognitive retraining  · Plan of Care Expires: 12/25/17  · Plan of Care Reviewed with: patient    This Plan of care has been discussed with the patient who was involved in its development and understands and is in agreement with the identified goals and treatment plan    GOALS:    Occupational Therapy Goals        Problem: Occupational Therapy Goal    Goal Priority Disciplines Outcome Interventions   Occupational Therapy Goal     OT, PT/OT Ongoing (interventions implemented as appropriate)    Description:  Goals to be met by: 12/10/17     Patient will increase functional independence with ADLs by performing:    UE Dressing with Set-up Assistance and Supervision.  LE Dressing with Minimal Assistance.  Grooming while standing at sink with Contact Guard Assistance.  Toileting from toilet with Stand-by Assistance for hygiene and clothing management.   Supine to sit with Supervision.  Stand pivot transfers with Contact Guard Assistance.  Bathing while EOB with Setup Assistance and Supervision.  Toilet transfer to toilet with Contact Guard Assistance.                       Time Tracking:     OT Date of Treatment: 12/04/17  OT Start Time: 1145  OT Stop Time: 1200  OT Total Time (min): 15 min    Billable Minutes:Therapeutic Activity 15 minutes    JA Potts  12/4/2017  Pager: [phone]

## 2017-12-04 NOTE — PROGRESS NOTES
[facility]  Hepatology  Progress Note    Patient Name: Marcie Bazan  MRN: [id number]  Admission Date: 11/23/2017  Hospital Length of Stay: 11 days  Attending Provider: Surinder Madrigal MD   Primary Care Physician: ODILIA Wall  Principal Problem:Decompensated hepatic cirrhosis    Subjective:     Transplant status: Pre-transplant    HPI: 52 year old female with PMHx of ESLD 2/2 EtOH hepatitis and essential HTN who presents to [facility] as a direct transfer from [facility] for evaluation of decompensated liver cirrhosis. Patient presented to [facility] with generalized weakness and was admitted to  11/10 for hyponatremia seen on labs in GI office. Discharged on 11/15 and returned on 11/20 with 1 week worsening weakness, malaise, lethargy, and decreased appetite. Denied fever/chills, abdominal pain, bright red or black tarry stools, hematemesis or other abnormal bleeding, confusion or disorientation, changes in urination, light-headedness, headache, changes in vision, dyspnea, chest pain or palpitations. Labs demonstrated Na 116 and MELD 32; patient admitted for hyponatremia. Nephrology consulted, recommended fluid restriction , holding diuretics, and prednisode taper that started 11/23 (decreased from 40mg QD to 20mg Qd.) Patient transferred to [facility] for revaluation of liver disease. Of note, paracentesis performed 11/22, removed 2L, negative for SBP.     Follows with Dr. Souza for cirrhosis. Since early 09/2017, patient's liver failure progressing indicated by worsening clinical signs (abdominal distention, weakness/fatigue) and MELD. She had been evaluated by [facility] liver transplant committee 10/25/2017 and declined transplant candidacy 2/2 continued EtOH use and lack of solid caregiver support/plan.She has since been enrolled in in-patient substance abuse program at [facility] where she has remained since early November; per patient, her last EtOH beverage  "was 11/01/2017.  Per chart review on 11/22/17:     "Patient's case was discussed in liver selection committee today. Per committee discussion, the SW will call patient's daughter to discuss caregiver plan at this time. If the caregiver plan is adequate, patient will need to follow up with either/both addiction psych or SW regarding substance abuse and plan. May need to do this inpatient if patient is still admitted."        Denies history of GI bleed, SBP, renal disease, or other complications of cirrhosis. Liver biopsy 10/24 demonstrated stage 4 cirrhosis. Records reports EGD in August 2016 that showed small esophageal varices with severe gastritis. Colonoscopy 2015 with 2 polyps and internal hemorrhoids Started on prednisone 40mg QD on 10/25; was on spironolactone, has been held since 11/10 for hypoNa; on lactulose.    Interval History:   Pt more awake today oriented to person, place, situation.   Pt endorses worsening ascites w/ significant distention and some TTP w/ associated difficulty lying back.   Pending Colonoscopy - Tuesday  Pending Mammogram - Planned today  Pending paracentesis      Current Facility-Administered Medications   Medication    albumin human 25% bottle 25 g    cefTRIAXone (ROCEPHIN) 1 g in dextrose 5 % 50 mL IVPB    lactulose 20 gram/30 mL solution Soln 20 g    ondansetron disintegrating tablet 4 mg    ondansetron injection 4 mg    pantoprazole EC tablet 40 mg    polyethylene glycol (GoLYTELY) solution    potassium phosphate 30 mmol in dextrose 5 % 500 mL infusion    predniSONE tablet 10 mg    Followed by    [START ON 12/9/2017] predniSONE tablet 5 mg    rifAXIMin tablet 550 mg    simethicone chewable tablet 80 mg    sodium chloride 0.9% flush 3 mL    sodium chloride 0.9% flush 3 mL       Objective:     Vital Signs (Most Recent):  Temp: 98.4 °F (36.9 °C) (12/04/17 1141)  Pulse: 95 (12/04/17 1141)  Resp: 18 (12/04/17 1141)  BP: 99/64 (12/04/17 1141)  SpO2: 96 % (12/04/17 1141) " Vital Signs (24h Range):  Temp:  [98.4 °F (36.9 °C)-99.7 °F (37.6 °C)] 98.4 °F (36.9 °C)  Pulse:  [89-99] 95  Resp:  [16-18] 18  SpO2:  [93 %-100 %] 96 %  BP: ()/(52-65) 99/64     Weight: 69.6 kg (153 lb 7 oz) (11/23/17 2147)  Body mass index is 25.53 kg/m².    Physical Exam   Constitutional: She is oriented to person, place, and time. She appears ill.   Eyes: Scleral icterus is present.   Cardiovascular: Normal rate.  Exam reveals no friction rub.    Pulmonary/Chest: Effort normal. No respiratory distress.   Abdominal: Soft. Bowel sounds are normal. She exhibits distension. There is no tenderness. There is no rebound and no guarding.   Musculoskeletal: She exhibits edema.   Neurological: She is alert and oriented to person, place, and time.       MELD-Na score: 32 at 12/4/2017  4:14 AM  MELD score: 31 at 12/4/2017  4:14 AM  Calculated from:  Serum Creatinine: 0.7 mg/dL (Rounded to 1) at 12/4/2017  4:14 AM  Serum Sodium: 133 mmol/L at 12/4/2017  4:14 AM  Total Bilirubin: 23.6 mg/dL at 12/4/2017  4:14 AM  INR(ratio): 3.1 at 12/4/2017  4:14 AM  Age: 52 years    Lab Results   Component Value Date    WBC 9.78 12/04/2017    HGB 8.3 (L) 12/04/2017    HCT 23.2 (L) 12/04/2017    MCV 85 12/04/2017    PLT 44 (L) 12/04/2017     Lab Results   Component Value Date    INR 3.1 (H) 12/04/2017     Lab Results   Component Value Date     (L) 12/04/2017    K 3.2 (L) 12/04/2017    CREATININE 0.7 12/04/2017     Lab Results   Component Value Date    ALBUMIN 4.1 12/04/2017    ALT 79 (H) 12/04/2017    AST 75 (H) 12/04/2017     (H) 10/20/2017    ALKPHOS 144 (H) 12/04/2017    BILITOT 23.6 (H) 12/04/2017     Lab Results   Component Value Date    AFP 9.0 (H) 11/24/2017       Imaging:  Reviewed and as noted in HPI.               Assessment/Plan:     * Decompensated hepatic cirrhosis    # Decompensated Cirrhosis due to alcohol, in remission.  # Ascites  # Hepatic encephalopathy  # Esophageal varices - banded during this  admission.  # Anemia - multifactorial  # HCC - 1cm liver mass on tpCT on 10/20/17  # Liver transplant candidacy - ongoing .   # Addiction risk - was doing inpatient rehab. Seen by addiction psych, thought to have improved insight.     Plan:  Pending Colonoscopy - Tuesday  Pending Mammogram - Planned today  Pending paracentesis  LT committee presentation on Wednesday    General recommendations:  · Daily CBC, CMP and INR for MELD calculation  · Frequent finger stick blood sugar check if active hepatic encepahalopathy  · Avoid narcotics and sedatives  · Low Na diet              Thank you for your consult. I will follow-up with patient. Please contact us if you have any additional questions.    Ginna Ren MD  Hepatology  [facility]

## 2017-12-04 NOTE — PLAN OF CARE
Problem: Patient Care Overview  Goal: Plan of Care Review  Outcome: Ongoing (interventions implemented as appropriate)  No acute events occurred overnight.  Pt had no complaints of pain or n/v.  IV antibiotics hung and completed during shift.  No falls or injuries occurred during night.  Will continue to monitor.

## 2017-12-04 NOTE — SUBJECTIVE & OBJECTIVE
Interval History: Patient seen and examined at bedside. Mammogram performed. FFPs ordered to reverse INR.     Review of Systems   Constitutional: Negative for chills and fever.   HENT: Negative for rhinorrhea and sore throat.    Eyes: Negative for pain and discharge.   Respiratory: Negative for cough and shortness of breath.    Cardiovascular: Negative for chest pain and leg swelling.   Gastrointestinal: Positive for abdominal distention and diarrhea. Negative for abdominal pain, blood in stool, nausea and vomiting.   Endocrine: Negative for cold intolerance and heat intolerance.   Genitourinary: Negative for dysuria and flank pain.   Neurological: Negative for dizziness and numbness.   Psychiatric/Behavioral: Positive for confusion. Negative for behavioral problems.     Objective:     Vital Signs (Most Recent):  Temp: 98.5 °F (36.9 °C) (12/04/17 1504)  Pulse: 88 (12/04/17 1504)  Resp: 17 (12/04/17 1504)  BP: 115/68 (12/04/17 1504)  SpO2: 100 % (12/04/17 1504) Vital Signs (24h Range):  Temp:  [98.4 °F (36.9 °C)-99.7 °F (37.6 °C)] 98.5 °F (36.9 °C)  Pulse:  [88-99] 88  Resp:  [16-18] 17  SpO2:  [93 %-100 %] 100 %  BP: ()/(52-68) 115/68     Weight: 69.6 kg (153 lb 7 oz)  Body mass index is 25.53 kg/m².  No intake or output data in the 24 hours ending 12/04/17 1521   Physical Exam   Constitutional: She appears well-developed and well-nourished.   HENT:   Head: Normocephalic and atraumatic.   Eyes: Conjunctivae are normal. Pupils are equal, round, and reactive to light. Scleral icterus is present.   Neck: Normal range of motion. No thyromegaly present.   Cardiovascular: Normal rate, regular rhythm and normal heart sounds.    Pulmonary/Chest: Effort normal and breath sounds normal.   Abdominal: Soft. She exhibits distension. There is no tenderness.   Musculoskeletal: Normal range of motion. She exhibits no edema.   Neurological: She is alert.   AAO times 2   Skin: No erythema. No pallor.       MELD-Na score: 32 at  12/4/2017  4:14 AM  MELD score: 31 at 12/4/2017  4:14 AM  Calculated from:  Serum Creatinine: 0.7 mg/dL (Rounded to 1) at 12/4/2017  4:14 AM  Serum Sodium: 133 mmol/L at 12/4/2017  4:14 AM  Total Bilirubin: 23.6 mg/dL at 12/4/2017  4:14 AM  INR(ratio): 3.1 at 12/4/2017  4:14 AM  Age: 52 years    Significant Labs:  CBC:    Recent Labs  Lab 12/03/17  0408 12/04/17  0414   WBC 7.97 9.78   HGB 9.4* 8.3*   HCT 26.2* 23.2*   PLT 48* 44*     CMP:    Recent Labs  Lab 12/03/17  0408 12/03/17  1604 12/04/17  0414   * 133* 133*   K 3.4* 3.0* 3.2*   CL 96 96 97   CO2 24 25 25   * 115* 87   BUN 6 7 8   CREATININE 0.7 0.8 0.7   CALCIUM 9.3 9.4 9.1   PROT 6.1 6.1 5.8*   ALBUMIN 4.2 4.4 4.1   BILITOT 23.9* 23.3* 23.6*   ALKPHOS 162* 140* 144*   AST 89* 82* 75*   ALT 99* 89* 79*   ANIONGAP 12 12 11   EGFRNONAA >60.0 >60.0 >60.0     PTINR:    Recent Labs  Lab 12/03/17  0408 12/04/17  0414   INR 2.8* 3.1*       Significant Procedures:   Dobutamine Stress Test with Color Flow:   Results for orders placed or performed during the hospital encounter of 10/19/17   Dobutamine Stress Test w/ Color Flow   Result Value Ref Range    EF 73 55 - 65    Diastolic Dysfunction No     Est. PA Systolic Pressure 21.32     Tricuspid Valve Regurgitation TRIVIAL

## 2017-12-04 NOTE — ASSESSMENT & PLAN NOTE
- on 800 cc fluid restriction; on albumin  - 116-->118-->120-->122-->123-->126-->125-->127-->131-->133

## 2017-12-04 NOTE — SUBJECTIVE & OBJECTIVE
Interval History:   Pt more awake today oriented to person, place, situation.   Pt endorses worsening ascites w/ significant distention and some TTP w/ associated difficulty lying back.   Pending Colonoscopy - Tuesday  Pending Mammogram - Planned today  Pending paracentesis      Current Facility-Administered Medications   Medication    albumin human 25% bottle 25 g    cefTRIAXone (ROCEPHIN) 1 g in dextrose 5 % 50 mL IVPB    lactulose 20 gram/30 mL solution Soln 20 g    ondansetron disintegrating tablet 4 mg    ondansetron injection 4 mg    pantoprazole EC tablet 40 mg    polyethylene glycol (GoLYTELY) solution    potassium phosphate 30 mmol in dextrose 5 % 500 mL infusion    predniSONE tablet 10 mg    Followed by    [START ON 12/9/2017] predniSONE tablet 5 mg    rifAXIMin tablet 550 mg    simethicone chewable tablet 80 mg    sodium chloride 0.9% flush 3 mL    sodium chloride 0.9% flush 3 mL       Objective:     Vital Signs (Most Recent):  Temp: 98.4 °F (36.9 °C) (12/04/17 1141)  Pulse: 95 (12/04/17 1141)  Resp: 18 (12/04/17 1141)  BP: 99/64 (12/04/17 1141)  SpO2: 96 % (12/04/17 1141) Vital Signs (24h Range):  Temp:  [98.4 °F (36.9 °C)-99.7 °F (37.6 °C)] 98.4 °F (36.9 °C)  Pulse:  [89-99] 95  Resp:  [16-18] 18  SpO2:  [93 %-100 %] 96 %  BP: ()/(52-65) 99/64     Weight: 69.6 kg (153 lb 7 oz) (11/23/17 2147)  Body mass index is 25.53 kg/m².    Physical Exam   Constitutional: She is oriented to person, place, and time. She appears ill.   Eyes: Scleral icterus is present.   Cardiovascular: Normal rate.  Exam reveals no friction rub.    Pulmonary/Chest: Effort normal. No respiratory distress.   Abdominal: Soft. Bowel sounds are normal. She exhibits distension. There is no tenderness. There is no rebound and no guarding.   Musculoskeletal: She exhibits edema.   Neurological: She is alert and oriented to person, place, and time.       MELD-Na score: 32 at 12/4/2017  4:14 AM  MELD score: 31 at 12/4/2017   4:14 AM  Calculated from:  Serum Creatinine: 0.7 mg/dL (Rounded to 1) at 12/4/2017  4:14 AM  Serum Sodium: 133 mmol/L at 12/4/2017  4:14 AM  Total Bilirubin: 23.6 mg/dL at 12/4/2017  4:14 AM  INR(ratio): 3.1 at 12/4/2017  4:14 AM  Age: 52 years    Lab Results   Component Value Date    WBC 9.78 12/04/2017    HGB 8.3 (L) 12/04/2017    HCT 23.2 (L) 12/04/2017    MCV 85 12/04/2017    PLT 44 (L) 12/04/2017     Lab Results   Component Value Date    INR 3.1 (H) 12/04/2017     Lab Results   Component Value Date     (L) 12/04/2017    K 3.2 (L) 12/04/2017    CREATININE 0.7 12/04/2017     Lab Results   Component Value Date    ALBUMIN 4.1 12/04/2017    ALT 79 (H) 12/04/2017    AST 75 (H) 12/04/2017     (H) 10/20/2017    ALKPHOS 144 (H) 12/04/2017    BILITOT 23.6 (H) 12/04/2017     Lab Results   Component Value Date    AFP 9.0 (H) 11/24/2017       Imaging:  Reviewed and as noted in HPI.

## 2017-12-04 NOTE — PLAN OF CARE
Problem: Patient Care Overview  Goal: Plan of Care Review  Outcome: Ongoing (interventions implemented as appropriate)  Pt VSS. Pt had mammogram done today. Pt planned for colonoscopy tomorrow. Will continue to monitor.     Problem: Fall Risk (Adult)  Intervention: Monitor/Assist with Self Care  Pt remains free of falls; up with 1 assist with walker. Bed alarm on.

## 2017-12-04 NOTE — PROGRESS NOTES
[facility] Hospital Medicine  Progress Note    Patient Name: Marcie Bazan  MRN: [id number]  Patient Class: IP- Inpatient   Admission Date: 11/23/2017  Length of Stay: 11 days  Expected Discharge Date: 12/6/2017  Attending Physician: Surinder Madrigal MD  Primary Care Provider: Milton Ferrer, [facility] Medicine Team: [facility] HOSP MED A Surinder Madrigal MD  Principal Problem:Decompensated hepatic cirrhosis    Subjective:     HPI:   52 year old female with PMHx of 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 Patient presented to [facility] with generalized weakness. Onset two months ago, progressively worsening. Was admitted to  11/10 for hyponatremia seen on labs in GI office. Discharged on 11/15 and returned on 11/20 with 1 week worsening weakness, malaise, lethargy, and decreased appetite. Denied fever/chills, abdominal pain, bright red or black tarry stools, hematemesis or other abnormal bleeding, confusion or disorientation, changes in urination, light-headedness, headache, changes in vision, dyspnea, chest pain or palpitations. Labs demonstrated Na 116 and MELD 32; patient admitted for hyponatremia. Nephrology consulted, recommended fluid restriction , holding diuretics, and prednisode taper that started 11/23 (decreased from 40mg QD to 20mg Qd.) Patient transferred to [facility] for revaluation of liver disease. Of note, paracentesis performed 11/22, removed 2L, negative for SBP.    Follows with Dr. Souza for cirrhosis. Since early 09/2017, patient's liver failure progressing indicated by worsening clinical signs (abdominal distention, weakness/fatigue) and MELD. In mid-October, patient's PETH demonstrated EtOH use and she was denied candidacy for liver transplant. She was then enrolled in in-patient substance abuse program at [facility]  where she has remained since early November; per patient, her last EtOH beverage was 11/01/2017. Denies history of GI bleed, SBP, renal disease, or other complications of cirrhosis. Liver biopsy 10/24 demonstrated stage 4 cirrhosis. Records reports EGD in August 2016 that showed small esophageal varices with severe gastritis. Colonoscopy 2015 with 2 polyps and internal hemorrhoids Started on prednisone 40mg QD on 10/25; was on spironolactone, has been held since 11/10 for hypoNa; on lactulose.    Hospital Course:  Mental status improving with hepatic encephalopathy treatment. Started empiric ceftriaxone given leukocytosis in immunocompromised state. CMV PCR positive overnight, for which ID was consulted, recommended repeating titers in 1 week. Seen by Psychiatry and  who determined she was moderate risk for Liver transplant. Will proceed with EGD/Cscope and mammogram after FFP. Colonoscopy not performed due to poor prep, EGD revealed large varices which were banded. IR paracentesis ordered after patient started complaining of abdominal pain. Mammogram performed, IR paracentesis after INR Reversal.       Interval History: Patient seen and examined at bedside. Mammogram performed. FFPs ordered to reverse INR.     Review of Systems   Constitutional: Negative for chills and fever.   HENT: Negative for rhinorrhea and sore throat.    Eyes: Negative for pain and discharge.   Respiratory: Negative for cough and shortness of breath.    Cardiovascular: Negative for chest pain and leg swelling.   Gastrointestinal: Positive for abdominal distention and diarrhea. Negative for abdominal pain, blood in stool, nausea and vomiting.   Endocrine: Negative for cold intolerance and heat intolerance.   Genitourinary: Negative for dysuria and flank pain.   Neurological: Negative for dizziness and numbness.   Psychiatric/Behavioral: Positive for confusion. Negative for behavioral problems.     Objective:     Vital Signs (Most  Recent):  Temp: 98.5 °F (36.9 °C) (12/04/17 1504)  Pulse: 88 (12/04/17 1504)  Resp: 17 (12/04/17 1504)  BP: 115/68 (12/04/17 1504)  SpO2: 100 % (12/04/17 1504) Vital Signs (24h Range):  Temp:  [98.4 °F (36.9 °C)-99.7 °F (37.6 °C)] 98.5 °F (36.9 °C)  Pulse:  [88-99] 88  Resp:  [16-18] 17  SpO2:  [93 %-100 %] 100 %  BP: ()/(52-68) 115/68     Weight: 69.6 kg (153 lb 7 oz)  Body mass index is 25.53 kg/m².  No intake or output data in the 24 hours ending 12/04/17 1521   Physical Exam   Constitutional: She appears well-developed and well-nourished.   HENT:   Head: Normocephalic and atraumatic.   Eyes: Conjunctivae are normal. Pupils are equal, round, and reactive to light. Scleral icterus is present.   Neck: Normal range of motion. No thyromegaly present.   Cardiovascular: Normal rate, regular rhythm and normal heart sounds.    Pulmonary/Chest: Effort normal and breath sounds normal.   Abdominal: Soft. She exhibits distension. There is no tenderness.   Musculoskeletal: Normal range of motion. She exhibits no edema.   Neurological: She is alert.   AAO times 2   Skin: No erythema. No pallor.       MELD-Na score: 32 at 12/4/2017  4:14 AM  MELD score: 31 at 12/4/2017  4:14 AM  Calculated from:  Serum Creatinine: 0.7 mg/dL (Rounded to 1) at 12/4/2017  4:14 AM  Serum Sodium: 133 mmol/L at 12/4/2017  4:14 AM  Total Bilirubin: 23.6 mg/dL at 12/4/2017  4:14 AM  INR(ratio): 3.1 at 12/4/2017  4:14 AM  Age: 52 years    Significant Labs:  CBC:    Recent Labs  Lab 12/03/17  0408 12/04/17  0414   WBC 7.97 9.78   HGB 9.4* 8.3*   HCT 26.2* 23.2*   PLT 48* 44*     CMP:    Recent Labs  Lab 12/03/17  0408 12/03/17  1604 12/04/17  0414   * 133* 133*   K 3.4* 3.0* 3.2*   CL 96 96 97   CO2 24 25 25   * 115* 87   BUN 6 7 8   CREATININE 0.7 0.8 0.7   CALCIUM 9.3 9.4 9.1   PROT 6.1 6.1 5.8*   ALBUMIN 4.2 4.4 4.1   BILITOT 23.9* 23.3* 23.6*   ALKPHOS 162* 140* 144*   AST 89* 82* 75*   ALT 99* 89* 79*   ANIONGAP 12 12 11    EGFRNONAA >60.0 >60.0 >60.0     PTINR:    Recent Labs  Lab 12/03/17  0408 12/04/17  0414   INR 2.8* 3.1*       Significant Procedures:   Dobutamine Stress Test with Color Flow:   Results for orders placed or performed during the hospital encounter of 10/19/17   Dobutamine Stress Test w/ Color Flow   Result Value Ref Range    EF 73 55 - 65    Diastolic Dysfunction No     Est. PA Systolic Pressure 21.32     Tricuspid Valve Regurgitation TRIVIAL             Assessment / Plan:     * Decompensated hepatic cirrhosis    MELD-Na score: 32 at 12/4/2017  4:14 AM  MELD score: 31 at 12/4/2017  4:14 AM  Calculated from:  Serum Creatinine: 0.7 mg/dL (Rounded to 1) at 12/4/2017  4:14 AM  Serum Sodium: 133 mmol/L at 12/4/2017  4:14 AM  Total Bilirubin: 23.6 mg/dL at 12/4/2017  4:14 AM  INR(ratio): 3.1 at 12/4/2017  4:14 AM  Age: 52 years   - had half of her evaluation completed on last admission  - AS per Psychiatry patient is moderate risk, will proceed with Liver transplant evaluation  - EGD performed, esophageal varices banded, colonoscopy aborted due to poor prep   - repeat IR paracentesis due to abdominal pain  - Mammogram performed on 12/4/17         Transplant donor evaluation    To be presented at Liver selection committee on 12/06/17          Immunosuppressed status    On chronic prednisone for alcoholic hepatitis; currently 15 mg, would go down by 5 weekly, next change would be 10 mg this saturday          Coagulopathy    INR 3.5 will give IV Vitamin K          Alcoholic cirrhosis of liver with ascites    - see decompensated cirrhosis           Cytomegalovirus (CMV) viremia    -- ID evaluated and do not feel it needs to be treated        Thrombocytopenia    -- monitor           Abnormal liver enzymes    -- Stable  -- Etiology liver failure; see that section for more details          Alcoholic hepatitis with ascites- decompensated with elevated MELD    - cont tapering prednisone, currently at 5 mg daily          Hyponatremia    - on 800 cc fluid restriction; on albumin  - 116-->118-->120-->122-->123-->126-->125-->127-->131-->133        End stage liver disease              HCC (hepatocellular carcinoma)    AFP 9; seen on CT on 10/19, 1 cm focus, monitor for now          Hepatic encephalopathy    -- Improving, though not yet returned to baseline, AAO times 3; put out 1 L in flexi-seal  -- Continue lactulose and rifaximin          Severe protein-calorie malnutrition    PAB, boost and dietary consult          Ascites    -- Holding diuretics at present, s/p 2L removed on 11/22 with testing negative for SBP  -- No abdominal pain today              Essential hypertension    - Stable  - Holding home lisinopril in setting of decompensated liver disease          Anemia of chronic disease    - monitor daily                VTE Risk Mitigation         Ordered     Place sequential compression device  Until discontinued      11/24/17 0734     Place FABIOLA hose  Until discontinued      11/23/17 2305     Medium Risk of VTE  Once      11/23/17 2305           Discharge Disposition: pending Liver transplant committee decision    Time taken to evaluate, plan, and coordinate patient care: 35 minutes.    Surinder Madrigal MD  Department of Hospital Medicine  [facility]

## 2017-12-04 NOTE — ASSESSMENT & PLAN NOTE
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  # Ascites  # Hepatic encephalopathy  # Esophageal varices - banded during this admission.  # Anemia - multifactorial  # HCC - 1cm liver mass on tpCT on 10/20/17  # Liver transplant candidacy - ongoing .   # Addiction risk - was doing inpatient rehab. Seen by addiction psych, thought to have improved insight.     Plan:  Pending Colonoscopy - Tuesday  Pending Mammogram - Planned today  Pending paracentesis  LT committee presentation on Wednesday    General recommendations:  · Daily CBC, CMP and INR for MELD calculation  · Frequent finger stick blood sugar check if active hepatic encepahalopathy  · Avoid narcotics and sedatives  · Low Na diet

## 2017-12-04 NOTE — ASSESSMENT & PLAN NOTE
MELD-Na score: 32 at 12/4/2017  4:14 AM  MELD score: 31 at 12/4/2017  4:14 AM  Calculated from:  Serum Creatinine: 0.7 mg/dL (Rounded to 1) at 12/4/2017  4:14 AM  Serum Sodium: 133 mmol/L at 12/4/2017  4:14 AM  Total Bilirubin: 23.6 mg/dL at 12/4/2017  4:14 AM  INR(ratio): 3.1 at 12/4/2017  4:14 AM  Age: 52 years   - had half of her evaluation completed on last admission  - AS per Psychiatry patient is moderate risk, will proceed with Liver transplant evaluation  - EGD performed, esophageal varices banded, colonoscopy aborted due to poor prep   - repeat IR paracentesis due to abdominal pain  - Mammogram performed on 12/4/17

## 2017-12-05 ENCOUNTER — TELEPHONE (OUTPATIENT)
Dept: GASTROENTEROLOGY | Facility: CLINIC | Age: 52
End: 2017-12-05

## 2017-12-05 ENCOUNTER — ANESTHESIA (OUTPATIENT)
Dept: ENDOSCOPY | Facility: HOSPITAL | Age: 52
DRG: 005 | End: 2017-12-05
Payer: COMMERCIAL

## 2017-12-05 ENCOUNTER — SURGERY (OUTPATIENT)
Age: 52
End: 2017-12-05

## 2017-12-05 LAB
ALBUMIN SERPL BCP-MCNC: 3.9 G/DL
ALBUMIN SERPL BCP-MCNC: 4 G/DL
ALP SERPL-CCNC: 144 U/L
ALP SERPL-CCNC: 148 U/L
ALT SERPL W/O P-5'-P-CCNC: 71 U/L
ALT SERPL W/O P-5'-P-CCNC: 79 U/L
ANION GAP SERPL CALC-SCNC: 13 MMOL/L
ANION GAP SERPL CALC-SCNC: 13 MMOL/L
ANISOCYTOSIS BLD QL SMEAR: SLIGHT
AST SERPL-CCNC: 65 U/L
AST SERPL-CCNC: 81 U/L
BASOPHILS # BLD AUTO: 0.02 K/UL
BASOPHILS NFR BLD: 0.2 %
BILIRUB SERPL-MCNC: 22.9 MG/DL
BILIRUB SERPL-MCNC: 26.5 MG/DL
BLD PROD TYP BPU: NORMAL
BLD PROD TYP BPU: NORMAL
BLOOD UNIT EXPIRATION DATE: NORMAL
BLOOD UNIT EXPIRATION DATE: NORMAL
BLOOD UNIT TYPE CODE: 6200
BLOOD UNIT TYPE CODE: 6200
BLOOD UNIT TYPE: NORMAL
BLOOD UNIT TYPE: NORMAL
BUN SERPL-MCNC: 10 MG/DL
BUN SERPL-MCNC: 9 MG/DL
CALCIUM SERPL-MCNC: 8.9 MG/DL
CALCIUM SERPL-MCNC: 9.3 MG/DL
CHLORIDE SERPL-SCNC: 94 MMOL/L
CHLORIDE SERPL-SCNC: 94 MMOL/L
CO2 SERPL-SCNC: 24 MMOL/L
CO2 SERPL-SCNC: 26 MMOL/L
CODING SYSTEM: NORMAL
CODING SYSTEM: NORMAL
CREAT SERPL-MCNC: 0.7 MG/DL
CREAT SERPL-MCNC: 0.8 MG/DL
DIFFERENTIAL METHOD: ABNORMAL
DISPENSE STATUS: NORMAL
DISPENSE STATUS: NORMAL
EOSINOPHIL # BLD AUTO: 0 K/UL
EOSINOPHIL NFR BLD: 0.3 %
ERYTHROCYTE [DISTWIDTH] IN BLOOD BY AUTOMATED COUNT: 23.3 %
EST. GFR  (AFRICAN AMERICAN): >60 ML/MIN/1.73 M^2
EST. GFR  (AFRICAN AMERICAN): >60 ML/MIN/1.73 M^2
EST. GFR  (NON AFRICAN AMERICAN): >60 ML/MIN/1.73 M^2
EST. GFR  (NON AFRICAN AMERICAN): >60 ML/MIN/1.73 M^2
GLUCOSE SERPL-MCNC: 86 MG/DL
GLUCOSE SERPL-MCNC: 87 MG/DL
HCT VFR BLD AUTO: 22.3 %
HGB BLD-MCNC: 8.1 G/DL
HYPOCHROMIA BLD QL SMEAR: ABNORMAL
IMM GRANULOCYTES # BLD AUTO: 0.05 K/UL
IMM GRANULOCYTES NFR BLD AUTO: 0.5 %
INR PPP: 2.3
LYMPHOCYTES # BLD AUTO: 2.7 K/UL
LYMPHOCYTES NFR BLD: 29.3 %
MAGNESIUM SERPL-MCNC: 1.7 MG/DL
MCH RBC QN AUTO: 31 PG
MCHC RBC AUTO-ENTMCNC: 36.3 G/DL
MCV RBC AUTO: 85 FL
MONOCYTES # BLD AUTO: 0.7 K/UL
MONOCYTES NFR BLD: 7.6 %
NEUTROPHILS # BLD AUTO: 5.7 K/UL
NEUTROPHILS NFR BLD: 62.1 %
NRBC BLD-RTO: 0 /100 WBC
NUM UNITS TRANS FFP: NORMAL
NUM UNITS TRANS FFP: NORMAL
PHOSPHATE SERPL-MCNC: 2.6 MG/DL
PLATELET # BLD AUTO: 44 K/UL
PLATELET BLD QL SMEAR: ABNORMAL
PMV BLD AUTO: ABNORMAL FL
POIKILOCYTOSIS BLD QL SMEAR: SLIGHT
POLYCHROMASIA BLD QL SMEAR: ABNORMAL
POTASSIUM SERPL-SCNC: 3.2 MMOL/L
POTASSIUM SERPL-SCNC: 3.2 MMOL/L
PROT SERPL-MCNC: 5.8 G/DL
PROT SERPL-MCNC: 6.1 G/DL
PROTHROMBIN TIME: 22.6 SEC
RBC # BLD AUTO: 2.61 M/UL
SCHISTOCYTES BLD QL SMEAR: PRESENT
SODIUM SERPL-SCNC: 131 MMOL/L
SODIUM SERPL-SCNC: 133 MMOL/L
TARGETS BLD QL SMEAR: ABNORMAL
WBC # BLD AUTO: 9.21 K/UL

## 2017-12-05 PROCEDURE — 36415 COLL VENOUS BLD VENIPUNCTURE: CPT

## 2017-12-05 PROCEDURE — 37000009 HC ANESTHESIA EA ADD 15 MINS: Performed by: INTERNAL MEDICINE

## 2017-12-05 PROCEDURE — G0121 COLON CA SCRN NOT HI RSK IND: HCPCS | Performed by: INTERNAL MEDICINE

## 2017-12-05 PROCEDURE — 36430 TRANSFUSION BLD/BLD COMPNT: CPT

## 2017-12-05 PROCEDURE — 99233 SBSQ HOSP IP/OBS HIGH 50: CPT | Mod: ,,, | Performed by: HOSPITALIST

## 2017-12-05 PROCEDURE — 80053 COMPREHEN METABOLIC PANEL: CPT | Mod: 91

## 2017-12-05 PROCEDURE — 84100 ASSAY OF PHOSPHORUS: CPT

## 2017-12-05 PROCEDURE — 25000003 PHARM REV CODE 250: Performed by: STUDENT IN AN ORGANIZED HEALTH CARE EDUCATION/TRAINING PROGRAM

## 2017-12-05 PROCEDURE — G0121 COLON CA SCRN NOT HI RSK IND: HCPCS | Mod: ,,, | Performed by: INTERNAL MEDICINE

## 2017-12-05 PROCEDURE — 37000008 HC ANESTHESIA 1ST 15 MINUTES: Performed by: INTERNAL MEDICINE

## 2017-12-05 PROCEDURE — D9220A PRA ANESTHESIA: Mod: 33,NTX,, | Performed by: ANESTHESIOLOGY

## 2017-12-05 PROCEDURE — 97803 MED NUTRITION INDIV SUBSEQ: CPT

## 2017-12-05 PROCEDURE — 20600001 HC STEP DOWN PRIVATE ROOM

## 2017-12-05 PROCEDURE — 83735 ASSAY OF MAGNESIUM: CPT

## 2017-12-05 PROCEDURE — 25000003 PHARM REV CODE 250: Mod: NTX | Performed by: NURSE ANESTHETIST, CERTIFIED REGISTERED

## 2017-12-05 PROCEDURE — 0DJD8ZZ INSPECTION OF LOWER INTESTINAL TRACT, VIA NATURAL OR ARTIFICIAL OPENING ENDOSCOPIC: ICD-10-PCS | Performed by: INTERNAL MEDICINE

## 2017-12-05 PROCEDURE — 63600175 PHARM REV CODE 636 W HCPCS: Mod: NTX | Performed by: NURSE ANESTHETIST, CERTIFIED REGISTERED

## 2017-12-05 PROCEDURE — P9017 PLASMA 1 DONOR FRZ W/IN 8 HR: HCPCS

## 2017-12-05 PROCEDURE — 85610 PROTHROMBIN TIME: CPT

## 2017-12-05 PROCEDURE — 63600175 PHARM REV CODE 636 W HCPCS: Performed by: HOSPITALIST

## 2017-12-05 PROCEDURE — 85025 COMPLETE CBC W/AUTO DIFF WBC: CPT

## 2017-12-05 PROCEDURE — 25000003 PHARM REV CODE 250: Performed by: HOSPITALIST

## 2017-12-05 RX ORDER — PROPOFOL 10 MG/ML
VIAL (ML) INTRAVENOUS
Status: DISCONTINUED | OUTPATIENT
Start: 2017-12-05 | End: 2017-12-05

## 2017-12-05 RX ORDER — LIDOCAINE HCL/PF 100 MG/5ML
SYRINGE (ML) INTRAVENOUS
Status: DISCONTINUED | OUTPATIENT
Start: 2017-12-05 | End: 2017-12-05

## 2017-12-05 RX ORDER — PROPOFOL 10 MG/ML
VIAL (ML) INTRAVENOUS CONTINUOUS PRN
Status: DISCONTINUED | OUTPATIENT
Start: 2017-12-05 | End: 2017-12-05

## 2017-12-05 RX ORDER — POTASSIUM CHLORIDE 20 MEQ/1
40 TABLET, EXTENDED RELEASE ORAL ONCE
Status: DISCONTINUED | OUTPATIENT
Start: 2017-12-05 | End: 2017-12-06

## 2017-12-05 RX ORDER — SODIUM CHLORIDE 9 MG/ML
INJECTION, SOLUTION INTRAVENOUS CONTINUOUS PRN
Status: DISCONTINUED | OUTPATIENT
Start: 2017-12-05 | End: 2017-12-05

## 2017-12-05 RX ADMIN — PREDNISONE 10 MG: 10 TABLET ORAL at 10:12

## 2017-12-05 RX ADMIN — LIDOCAINE HYDROCHLORIDE 50 MG: 20 INJECTION, SOLUTION INTRAVENOUS at 01:12

## 2017-12-05 RX ADMIN — PROPOFOL 50 MG: 10 INJECTION, EMULSION INTRAVENOUS at 01:12

## 2017-12-05 RX ADMIN — CEFTRIAXONE SODIUM 1 G: 1 INJECTION, POWDER, FOR SOLUTION INTRAMUSCULAR; INTRAVENOUS at 02:12

## 2017-12-05 RX ADMIN — LACTULOSE 20 G: 20 SOLUTION ORAL at 09:12

## 2017-12-05 RX ADMIN — RIFAXIMIN 550 MG: 550 TABLET ORAL at 09:12

## 2017-12-05 RX ADMIN — PANTOPRAZOLE SODIUM 40 MG: 40 TABLET, DELAYED RELEASE ORAL at 10:12

## 2017-12-05 RX ADMIN — RIFAXIMIN 550 MG: 550 TABLET ORAL at 10:12

## 2017-12-05 RX ADMIN — PROPOFOL 150 MCG/KG/MIN: 10 INJECTION, EMULSION INTRAVENOUS at 01:12

## 2017-12-05 RX ADMIN — SODIUM CHLORIDE: 0.9 INJECTION, SOLUTION INTRAVENOUS at 12:12

## 2017-12-05 RX ADMIN — ONDANSETRON 4 MG: 4 TABLET, ORALLY DISINTEGRATING ORAL at 10:12

## 2017-12-05 NOTE — PROGRESS NOTES
"  [facility]  Adult Nutrition  Consult Note    SUMMARY     Recommendations    1. When medically feasible, resume full liquid diet + Boost Plus ONS. ADAT to 2 gram sodium.   2. RD to monitor & follow-up.    Goals: pt to consume nutrients >/= 50% EEN/EPN by next visit  Nutrition Goal Status: progressing towards goal  Communication of RD Recs: reviewed with RN    Reason for Assessment    Reason for Assessment: RD follow-up  Diagnosis:  (Decompensated hepatic cirrhosis )  Relevent Medical History: EtOH Hepatits, Cirrhosis, ESRD, Tx candidate,    Interdisciplinary Rounds: did not attend     General Information Comments: At time of visit, pt was NPO for colonoscopy. Prior to NPO status, pt on full liquid diet & tolerating. No further questions regarding low sodium diet.  Nutrition Discharge Planning: Cardiac diet w/ po intake > 75% EEN/EPN    Nutrition Prescription Ordered    Current Diet Order: NPO    Nutrition/Diet History    Patient Reported Diet/Restrictions/Preferences: low salt     Typical Food/Fluid Intake: decreased appetite, but never was a big eater     Food Preferences: No cultural or Holiness preferences noted     Factors Affecting Nutritional Intake: NPO, decreased appetite    Labs/Tests/Procedures/Meds    Pertinent Labs Reviewed: reviewed  Pertinent Labs Comments: Na 131, Bili 22.9  Pertinent Medications Reviewed: reviewed, pertinent  Pertinent Medications Comments: Prednisone    Physical Findings    Overall Physical Appearance: weak, lethargic, hypertonia, loss of subcutaneous fat, loss of muscle mass, generalized wasting  Oral/Mouth Cavity: all teeth present (age appropriate)  Skin: intact    Anthropometrics    Height: 5' 5" (165.1 cm)  Weight Method: Bed Scale  Weight: 69.6 kg (153 lb 7 oz)     Ideal Body Weight (IBW), Female: 125 lb  % Ideal Body Weight, Female (lb): 122.75 lb     BMI (Calculated): 25.6  BMI Grade: 25 - 29.9 - overweight     Usual Body Weight (UBW), k kg  Weight " Change Amount: 22 lb 0.7 oz    Estimated/Assessed Needs    Weight Used For Calorie Calculations: 69.6 kg (153 lb 7 oz)   Height (cm): 165.1 cm    Energy Calorie Requirements (kcal): 1705  Energy Need Method: [place]-St Jeor (X 1.25)     Weight Used For Protein Calculations: 69.6 kg (153 lb 7 oz)  Protein Requirements: 70-84g/d    Fluid Requirements (mL): per MD    Assessment and Plan    Severe protein-calorie malnutrition     Related to (etiology):   Altered nutrient utiliztion 2/2 EtOH abuse     Signs and Symptoms (as evidenced by):   EtOH Cirrhosis, Hepatitis Elevated labs(BUN, Alk Phos, T.hayley. AST, ALT)    Nutrition Diagnosis Status:   New     Monitor and Evaluation    Food and Nutrient Intake: energy intake, food and beverage intake  Food and Nutrient Adminstration: diet order     Physical Activity and Function: nutrition-related ADLs and IADLs  Anthropometric Measurements: weight, weight change  Biochemical Data, Medical Tests and Procedures:  (All Labs)  Nutrition-Focused Physical Findings: overall appearance    Nutrition Risk    Level of Risk:  (f/u 1x wk)    Nutrition Follow-Up    RD Follow-up?: Yes

## 2017-12-05 NOTE — H&P (VIEW-ONLY)
[facility]  Critical Care Medicine  Consult Note    Patient Name: Marcie Bazan  MRN: [id number]  Admission Date: 11/23/2017  Hospital Length of Stay: 1 days  Code Status: Full Code  Attending Physician: Chandra Campbell, *   Primary Care Provider: ODILIA Wall   Principal Problem: Decompensated hepatic cirrhosis    Inpatient consult to Critical Care Medicine  Consult performed by: YUN TAI  Consult ordered by: LANIE LO  Reason for consult: hyponatremia and AMS         Subjective:     HPI:  52 year old female with PMHx of 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 Patient presented to [facility] with generalized weakness. Onset two months ago, progressively worsening. Was admitted to  11/10 for hyponatremia seen on labs in GI office. Discharged on 11/15 and returned on 11/20 with 1 week worsening weakness, malaise, lethargy, and decreased appetite. Denied fever/chills, abdominal pain, bright red or black tarry stools, hematemesis or other abnormal bleeding, confusion or disorientation, changes in urination, light-headedness, headache, changes in vision, dyspnea, chest pain or palpitations. Labs demonstrated Na 116 and MELD 32; patient admitted for hyponatremia. Nephrology consulted, recommended fluid restriction , holding diuretics, and prednisode taper that started 11/23 (decreased from 40mg QD to 20mg Qd.) Patient transferred to [facility] for revaluation of liver disease. Of note, paracentesis performed 11/22, removed 2L, negative for SBP.     Follows with Dr. Souza for cirrhosis. Since early 09/2017, patient's liver failure progressing indicated by worsening clinical signs (abdominal distention, weakness/fatigue) and MELD. In mid-October, patient's PETH demonstrated EtOH use and she was denied  candidacy for liver transplant. She was then enrolled in in-patient substance abuse program at [facility] where she has remained since early November; per patient, her last EtOH beverage was 11/01/2017. Denies history of GI bleed, SBP, renal disease, or other complications of cirrhosis. Liver biopsy 10/24 demonstrated stage 4 cirrhosis. Records reports EGD in August 2016 that showed small esophageal varices with severe gastritis. Colonoscopy 2015 with 2 polyps and internal hemorrhoids Started on prednisone 40mg QD on 10/25; was on spironolactone, has been held since 11/10 for hypoNa; on lactulose.    ICU consulted for AMS and Hyponatremia. During my interview, pt. Is alert, oriented to time, place and person. Denies abdominal pain, nausea or vomiting.     Hospital/ICU Course:  No notes on file    Past Medical History:   Diagnosis Date    Alcohol abuse     Alcoholic hepatitis     Anemia of chronic disease     Coagulopathy     End stage liver disease     Hypertension     Hyponatremia     Liver cirrhosis     Liver transplant candidate        Past Surgical History:   Procedure Laterality Date    BREAST SURGERY      CHOLECYSTECTOMY      HYSTERECTOMY         Review of patient's allergies indicates:   Allergen Reactions    Ferrous sulfate Other (See Comments)     Patient states the pill makes her sick. She stated she would rather have a shot       Family History     Problem Relation (Age of Onset)    Depression Maternal Grandfather    Diabetes Father, Sister    Hypertension Mother, Father        Social History Main Topics    Smoking status: Never Smoker    Smokeless tobacco: Never Used    Alcohol use Yes      Comment: Currently admitted to inpatient rehab    Drug use: No    Sexual activity: Not Currently      Review of Systems   Unable to perform ROS: Mental status change     Objective:     Vital Signs (Most Recent):  Temp: 97.8 °F (36.6 °C) (11/24/17 1121)  Pulse: 93 (11/24/17 1121)  Resp: 16 (11/24/17  1121)  BP: 118/61 (11/24/17 1121)  SpO2: 98 % (11/24/17 1121) Vital Signs (24h Range):  Temp:  [97.8 °F (36.6 °C)-98.4 °F (36.9 °C)] 97.8 °F (36.6 °C)  Pulse:  [] 93  Resp:  [14-18] 16  SpO2:  [97 %-99 %] 98 %  BP: (111-121)/(56-63) 118/61   Weight: 69.6 kg (153 lb 7 oz)  Body mass index is 25.53 kg/m².    No intake or output data in the 24 hours ending 11/24/17 1509    Physical Exam  General: Well developed, well nourished female in NAD  HEENT: scleral icterus. Conjunctiva clear; Oropharynx clear  Neck: No JVD noted, Supple  CV: Normal S1, S2 with no murmurs  Resp: Lungs CTA Bilaterally, Unlabored  Abdomen: distended and positive ascites. +ve BS.   Extrem: No cyanosis, clubbing, edema.  Skin: No rashes, lesions, ulcers  Neuro: oriented to time, place and person. motor strength in tact. No focal deficit      Lines/Drains/Airways     Peripheral Intravenous Line                 Peripheral IV - Single Lumen 11/21/17 1104 Left Forearm 3 days              Significant Labs:    CBC/Anemia Profile:    Recent Labs  Lab 11/23/17  0530 11/24/17  0410   WBC 11.15 11.15   HGB 11.1* 11.0*   HCT 29.7* 29.4*   PLT 80* 60*   MCV 79* 78*   RDW 24.5* 23.7*        Chemistries:    Recent Labs  Lab 11/23/17  0530  11/23/17  2221 11/23/17  2335 11/24/17  0410 11/24/17  1134   *  < > 119*  --  117*  117* 119*   K 3.4*  < > 3.5  --  3.4*  3.4* 3.8   CL 90*  < > 89*  --  87*  87* 88*   CO2 21*  < > 22*  --  22*  22* 23   BUN 10  < > 11  --  10  10 11   CREATININE 0.6  < > 0.7  --  0.6  0.6 0.7   CALCIUM 8.2*  < > 8.3*  --  8.0*  8.0* 8.3*   ALBUMIN 2.0*  --  2.1*  --  1.9*  --    PROT 5.6*  --  5.7*  --  5.7*  --    BILITOT 21.3*  --  23.7*  --  22.7*  --    ALKPHOS 292*  --  329*  --  321*  --    *  --  416*  --  397*  --    *  --  294*  --  284*  --    MG  --   --   --  1.8  --   --    PHOS  --   --   --  2.5*  --   --    < > = values in this interval not displayed.      Assessment/Plan:     Renal/    Hyponatremia    A 52 y.o female with alcoholic and PERKINS cirrhosis. Pt. Has hyponatremia which likely to be hypervolemic hyponatremia. Also, has hepatic encephalopathy.     Plan:   1. Diuresis to excrete free water.   2. Agree with treating  Hepatic encephalopathy with Lactulose and rifaximin.   3. Consider diagnostic and therapeutic paracentesis.   4. No need for ICU admission at this time. Please call for any worsening mental status.                Case discussed with ICU staff PEDRO Aguilar  Thank you for your consult. I will sign off. Please contact us if you have any additional questions.     Benoit Aguayo MD  Critical Care Medicine  [facility]

## 2017-12-05 NOTE — NURSING TRANSFER
Nursing Transfer Note      12/5/2017     Transfer To: 1049 From [facility] 36    Transfer via stretcher    Transfer with NA    Transported by PCT    Medicines sent: NONE    Chart send with patient: Yes    Notified: Radha LORA    Patient reassessed at: 1424 12/5/17    Upon arrival to floor: cardiac monitor applied, patient oriented to room, call bell in reach and bed in lowest position    VSS. No complaints of pain reported. Notified IR and US of additional procedures, IR stated paracinesis to occur tomorrow and bedside US has not been ordered.

## 2017-12-05 NOTE — PLAN OF CARE
Problem: Patient Care Overview  Goal: Plan of Care Review  Outcome: Ongoing (interventions implemented as appropriate)  No acute events occurred overnight.  Pt complained of n/v while drinking GoLytely.  PRN zofran administered.  Pt has been NPO since midnight.  2 units of FFP's hung starting at midnight.    IV antibiotics hung and completed during shift.  No falls or injuries occurred during night.  Will continue to monitor.

## 2017-12-05 NOTE — INTERVAL H&P NOTE
The patient has been examined and the H&P has been reviewed:    There is no interval changes since last encounter.    Colonoscopy: Screening for CRC, Evaluation for Liver transplant  Sedation: GA  ASA: Per anesthesia  Mallampati: Per anesthesia    Endoscopy risks, benefits and alternative options discussed and understood by patient/family.          Active Hospital Problems    Diagnosis  POA    *Decompensated hepatic cirrhosis [K72.90]  Yes    Transplant donor evaluation [Z00.5]  Not Applicable    Immunosuppressed status [D89.9]  Yes    Alcoholic cirrhosis of liver with ascites [K70.31]  Yes    Coagulopathy [D68.9]  Yes    Abnormal liver enzymes [R74.8]  Yes    Thrombocytopenia [D69.6]  Yes    Cytomegalovirus (CMV) viremia [B25.9]  Yes    Alcoholic hepatitis with ascites- decompensated with elevated MELD [K70.11]  Yes    Hyponatremia [E87.1]  Yes    HCC (hepatocellular carcinoma) [C22.0]  Yes    Hepatic encephalopathy [K72.90]  Yes    Severe protein-calorie malnutrition [E43]  Yes    Ascites [R18.8]  Yes    Anemia of chronic disease [D64.9]  Yes     Chronic      Resolved Hospital Problems    Diagnosis Date Resolved POA   No resolved problems to display.

## 2017-12-05 NOTE — PT/OT/SLP PROGRESS
Physical Therapy      Patient Name:  Marcie Bazan   MRN:  [id number]    Patient not seen today secondary to being off the floor for a colonoscopy. Will follow-up at next available appointment.    Khoi Melendrez, PT   12/5/2017

## 2017-12-05 NOTE — PATIENT INSTRUCTIONS
Discharge Summary/Instructions after an Endoscopic Procedure  Patient Name: Marcie Bazan  Patient MRN: [id number]  Patient YOB: 1965  Tuesday, December 05, 2017  José Chavira MD  RESTRICTIONS:  During your procedure today, you received medications for sedation.  These   medications may affect your judgment, balance and coordination.  Therefore,   for 24 hours, you have the following restrictions:   - DO NOT drive a car, operate machinery, make legal/financial decisions,   sign important papers or drink alcohol.    ACTIVITY:  The following day: return to full activity including work, except no heavy   lifting, straining or running for 3 days if polyps were removed.  DIET:  Eat and drink normally unless instructed otherwise.     TREATMENT FOR COMMON SIDE EFFECTS:  - Mild abdominal pain, belching, bloating or excessive gas: rest, eat   lightly and use a heating pad.  - Sore Throat: treat with throat lozenges and/or gargle with warm salt   water.  SYMPTOMS TO WATCH FOR AND REPORT TO YOUR PHYSICIAN:  1. Abdominal pain or bloating, other than gas cramps.  2. Chest pain.  3. Back pain.  4. Chills or fever occurring within 24 hours after the procedure.  5. Rectal bleeding, which would show as bright red, maroon, or black stools.   (A tablespoon of blood from the rectum is not serious, especially if   hemorrhoids are present.)  6. Vomiting.  7. Weakness or dizziness.  8. Because air was used during the procedure, expelling large amounts of air   from your rectum or belching is normal.  9. If a bowel prep was taken, you may not have a bowel movement for 1-3   days.  This is normal.  GO DIRECTLY TO THE NEAREST EMERGENCY ROOM IF YOU HAVE ANY OF THE FOLLOWING:      Difficulty breathing  Chills and/or fever over 101 F   Persistent vomiting and/or vomiting blood   Severe abdominal pain   Severe chest pain   Black, tarry stools   Bleeding- more than one tablespoon   Any other symptom or condition that you may feel  needs urgent attention  Your doctor recommends these additional instructions:  If any biopsies were taken, your doctor s clinic will contact you in 1 to 2   weeks with any results.  Your physician has recommended a repeat colonoscopy in five years for   screening purposes.   An appointment has been made (or make an appointment) to see a colo-rectal   surgeon at appointment to be scheduled.  For questions, problems or results please call your physician - José Chavira MD at Work:  [phone].  [facility], EMERGENCY ROOM PHONE NUMBER: [phone]  IF A COMPLICATION OR EMERGENCY SITUATION ARISES AND YOU ARE UNABLE TO REACH   YOUR PHYSICIAN - GO DIRECTLY TO THE EMERGENCY ROOM.  José Chavira MD  12/5/2017 1:55:48 PM  This report has been verified and signed electronically.

## 2017-12-05 NOTE — TELEPHONE ENCOUNTER
----- Message from José Chavira MD sent at 12/5/2017  1:56 PM CST -----  Please schedule clinic visit with CRS in -6 weeks for anal fissure and hemorrhoids.

## 2017-12-05 NOTE — TRANSFER OF CARE
"Anesthesia Transfer of Care Note    Patient: Marcie Bazan    Procedure(s) Performed: Procedure(s) (LRB):  COLONOSCOPY (N/A)    Patient location: [facility]    Anesthesia Type: general    Transport from OR: Transported from OR on room air with adequate spontaneous ventilation    Post pain: adequate analgesia    Post assessment: no apparent anesthetic complications    Post vital signs: stable    Level of consciousness: awake, alert and oriented    Nausea/Vomiting: no nausea/vomiting    Complications: none    Transfer of care protocol was followed      Last vitals:   Visit Vitals  /65 (BP Location: Left arm, Patient Position: Lying)   Pulse 80   Temp 36.6 °C (97.9 °F) (Temporal)   Resp 16   Ht 5' 5" (1.651 m)   Wt 69.6 kg (153 lb 7 oz)   LMP 01/01/1985 (Approximate)   SpO2 98%   Breastfeeding? No   BMI 25.53 kg/m²     "

## 2017-12-06 ENCOUNTER — COMMITTEE REVIEW (OUTPATIENT)
Dept: TRANSPLANT | Facility: CLINIC | Age: 52
End: 2017-12-06

## 2017-12-06 LAB
ALBUMIN FLD-MCNC: 2.1 G/DL
ALBUMIN SERPL BCP-MCNC: 3.8 G/DL
ALBUMIN SERPL BCP-MCNC: 4 G/DL
ALP SERPL-CCNC: 114 U/L
ALP SERPL-CCNC: 129 U/L
ALT SERPL W/O P-5'-P-CCNC: 55 U/L
ALT SERPL W/O P-5'-P-CCNC: 65 U/L
ANION GAP SERPL CALC-SCNC: 10 MMOL/L
ANION GAP SERPL CALC-SCNC: 11 MMOL/L
ANISOCYTOSIS BLD QL SMEAR: SLIGHT
APPEARANCE FLD: CLEAR
AST SERPL-CCNC: 52 U/L
AST SERPL-CCNC: 59 U/L
BASOPHILS # BLD AUTO: 0.02 K/UL
BASOPHILS NFR BLD: 0.3 %
BILIRUB SERPL-MCNC: 23.5 MG/DL
BILIRUB SERPL-MCNC: 24 MG/DL
BODY FLD TYPE: NORMAL
BODY FLUID SOURCE, LDH: NORMAL
BUN SERPL-MCNC: 10 MG/DL
BUN SERPL-MCNC: 9 MG/DL
CALCIUM SERPL-MCNC: 9.1 MG/DL
CALCIUM SERPL-MCNC: 9.6 MG/DL
CHLORIDE SERPL-SCNC: 96 MMOL/L
CHLORIDE SERPL-SCNC: 97 MMOL/L
CO2 SERPL-SCNC: 27 MMOL/L
CO2 SERPL-SCNC: 30 MMOL/L
COLOR FLD: YELLOW
CREAT SERPL-MCNC: 0.8 MG/DL
CREAT SERPL-MCNC: 0.8 MG/DL
DIFFERENTIAL METHOD: ABNORMAL
EOSINOPHIL # BLD AUTO: 0 K/UL
EOSINOPHIL NFR BLD: 0.4 %
ERYTHROCYTE [DISTWIDTH] IN BLOOD BY AUTOMATED COUNT: 23.3 %
EST. GFR  (AFRICAN AMERICAN): >60 ML/MIN/1.73 M^2
EST. GFR  (AFRICAN AMERICAN): >60 ML/MIN/1.73 M^2
EST. GFR  (NON AFRICAN AMERICAN): >60 ML/MIN/1.73 M^2
EST. GFR  (NON AFRICAN AMERICAN): >60 ML/MIN/1.73 M^2
GLUCOSE SERPL-MCNC: 83 MG/DL
GLUCOSE SERPL-MCNC: 97 MG/DL
GRAM STN SPEC: NORMAL
HCT VFR BLD AUTO: 22.3 %
HGB BLD-MCNC: 7.9 G/DL
HYPOCHROMIA BLD QL SMEAR: ABNORMAL
IMM GRANULOCYTES # BLD AUTO: 0.04 K/UL
IMM GRANULOCYTES NFR BLD AUTO: 0.5 %
INR PPP: 2.7
LDH FLD L TO P-CCNC: 67 U/L
LYMPHOCYTES # BLD AUTO: 2.8 K/UL
LYMPHOCYTES NFR BLD: 35.1 %
LYMPHOCYTES NFR FLD MANUAL: 41 %
MAGNESIUM SERPL-MCNC: 1.6 MG/DL
MCH RBC QN AUTO: 30.6 PG
MCHC RBC AUTO-ENTMCNC: 35.4 G/DL
MCV RBC AUTO: 86 FL
MESOTHL CELL NFR FLD MANUAL: 1 %
MONOCYTES # BLD AUTO: 0.5 K/UL
MONOCYTES NFR BLD: 6.9 %
MONOS+MACROS NFR FLD MANUAL: 58 %
NEUTROPHILS # BLD AUTO: 4.5 K/UL
NEUTROPHILS NFR BLD: 56.8 %
NRBC BLD-RTO: 0 /100 WBC
OVALOCYTES BLD QL SMEAR: ABNORMAL
PHOSPHATE SERPL-MCNC: 2.2 MG/DL
PLATELET # BLD AUTO: 51 K/UL
PMV BLD AUTO: 10.9 FL
POIKILOCYTOSIS BLD QL SMEAR: SLIGHT
POLYCHROMASIA BLD QL SMEAR: ABNORMAL
POTASSIUM SERPL-SCNC: 2.6 MMOL/L
POTASSIUM SERPL-SCNC: 3.3 MMOL/L
PROT FLD-MCNC: 2.8 G/DL
PROT SERPL-MCNC: 5.6 G/DL
PROT SERPL-MCNC: 5.6 G/DL
PROTHROMBIN TIME: 27.5 SEC
RBC # BLD AUTO: 2.58 M/UL
SCHISTOCYTES BLD QL SMEAR: ABNORMAL
SODIUM SERPL-SCNC: 134 MMOL/L
SODIUM SERPL-SCNC: 137 MMOL/L
SPECIMEN SOURCE: NORMAL
SPECIMEN SOURCE: NORMAL
TARGETS BLD QL SMEAR: ABNORMAL
WBC # BLD AUTO: 7.83 K/UL
WBC # FLD: 72 /CU MM

## 2017-12-06 PROCEDURE — 80053 COMPREHEN METABOLIC PANEL: CPT | Mod: 91

## 2017-12-06 PROCEDURE — P9047 ALBUMIN (HUMAN), 25%, 50ML: HCPCS | Performed by: PHYSICIAN ASSISTANT

## 2017-12-06 PROCEDURE — 83735 ASSAY OF MAGNESIUM: CPT

## 2017-12-06 PROCEDURE — 84100 ASSAY OF PHOSPHORUS: CPT

## 2017-12-06 PROCEDURE — 84157 ASSAY OF PROTEIN OTHER: CPT

## 2017-12-06 PROCEDURE — 20600001 HC STEP DOWN PRIVATE ROOM

## 2017-12-06 PROCEDURE — 25000003 PHARM REV CODE 250: Performed by: HOSPITALIST

## 2017-12-06 PROCEDURE — 82042 OTHER SOURCE ALBUMIN QUAN EA: CPT

## 2017-12-06 PROCEDURE — 97530 THERAPEUTIC ACTIVITIES: CPT

## 2017-12-06 PROCEDURE — 99231 SBSQ HOSP IP/OBS SF/LOW 25: CPT | Mod: ,,, | Performed by: INTERNAL MEDICINE

## 2017-12-06 PROCEDURE — 89051 BODY FLUID CELL COUNT: CPT

## 2017-12-06 PROCEDURE — 36415 COLL VENOUS BLD VENIPUNCTURE: CPT

## 2017-12-06 PROCEDURE — 85025 COMPLETE CBC W/AUTO DIFF WBC: CPT

## 2017-12-06 PROCEDURE — 83615 LACTATE (LD) (LDH) ENZYME: CPT

## 2017-12-06 PROCEDURE — 63600175 PHARM REV CODE 636 W HCPCS: Performed by: PHYSICIAN ASSISTANT

## 2017-12-06 PROCEDURE — 25000003 PHARM REV CODE 250: Performed by: STUDENT IN AN ORGANIZED HEALTH CARE EDUCATION/TRAINING PROGRAM

## 2017-12-06 PROCEDURE — 63600175 PHARM REV CODE 636 W HCPCS: Performed by: HOSPITALIST

## 2017-12-06 PROCEDURE — 97116 GAIT TRAINING THERAPY: CPT

## 2017-12-06 PROCEDURE — 87070 CULTURE OTHR SPECIMN AEROBIC: CPT

## 2017-12-06 PROCEDURE — P9047 ALBUMIN (HUMAN), 25%, 50ML: HCPCS | Performed by: HOSPITALIST

## 2017-12-06 PROCEDURE — 97535 SELF CARE MNGMENT TRAINING: CPT

## 2017-12-06 PROCEDURE — 85610 PROTHROMBIN TIME: CPT

## 2017-12-06 PROCEDURE — 87075 CULTR BACTERIA EXCEPT BLOOD: CPT

## 2017-12-06 PROCEDURE — 0W9G3ZX DRAINAGE OF PERITONEAL CAVITY, PERCUTANEOUS APPROACH, DIAGNOSTIC: ICD-10-PCS | Performed by: RADIOLOGY

## 2017-12-06 PROCEDURE — 99233 SBSQ HOSP IP/OBS HIGH 50: CPT | Mod: ,,, | Performed by: HOSPITALIST

## 2017-12-06 PROCEDURE — 25000003 PHARM REV CODE 250: Performed by: PHYSICIAN ASSISTANT

## 2017-12-06 PROCEDURE — 87205 SMEAR GRAM STAIN: CPT

## 2017-12-06 RX ORDER — ALBUMIN HUMAN 250 G/1000ML
37.5 SOLUTION INTRAVENOUS ONCE
Status: COMPLETED | OUTPATIENT
Start: 2017-12-06 | End: 2017-12-06

## 2017-12-06 RX ORDER — MIDODRINE HYDROCHLORIDE 5 MG/1
5 TABLET ORAL 3 TIMES DAILY
Status: DISCONTINUED | OUTPATIENT
Start: 2017-12-06 | End: 2017-12-19

## 2017-12-06 RX ORDER — ALBUMIN HUMAN 250 G/1000ML
25 SOLUTION INTRAVENOUS ONCE
Status: COMPLETED | OUTPATIENT
Start: 2017-12-06 | End: 2017-12-06

## 2017-12-06 RX ADMIN — LACTULOSE 20 G: 20 SOLUTION ORAL at 05:12

## 2017-12-06 RX ADMIN — PHYTONADIONE 5 MG: 10 INJECTION, EMULSION INTRAMUSCULAR; INTRAVENOUS; SUBCUTANEOUS at 10:12

## 2017-12-06 RX ADMIN — PREDNISONE 10 MG: 10 TABLET ORAL at 10:12

## 2017-12-06 RX ADMIN — RIFAXIMIN 550 MG: 550 TABLET ORAL at 10:12

## 2017-12-06 RX ADMIN — MIDODRINE HYDROCHLORIDE 5 MG: 5 TABLET ORAL at 01:12

## 2017-12-06 RX ADMIN — CEFTRIAXONE SODIUM 1 G: 1 INJECTION, POWDER, FOR SOLUTION INTRAMUSCULAR; INTRAVENOUS at 02:12

## 2017-12-06 RX ADMIN — POTASSIUM BICARBONATE 50 MEQ: 25 TABLET, EFFERVESCENT ORAL at 01:12

## 2017-12-06 RX ADMIN — MIDODRINE HYDROCHLORIDE 5 MG: 5 TABLET ORAL at 10:12

## 2017-12-06 RX ADMIN — POTASSIUM BICARBONATE 50 MEQ: 25 TABLET, EFFERVESCENT ORAL at 10:12

## 2017-12-06 RX ADMIN — PANTOPRAZOLE SODIUM 40 MG: 40 TABLET, DELAYED RELEASE ORAL at 10:12

## 2017-12-06 RX ADMIN — ALUMINUM HYDROXIDE, MAGNESIUM HYDROXIDE, AND SIMETHICONE 50 ML: 200; 200; 20 SUSPENSION ORAL at 05:12

## 2017-12-06 RX ADMIN — MIDODRINE HYDROCHLORIDE 5 MG: 5 TABLET ORAL at 05:12

## 2017-12-06 RX ADMIN — ALBUMIN (HUMAN) 37.5 G: 25 SOLUTION INTRAVENOUS at 12:12

## 2017-12-06 RX ADMIN — LACTULOSE 20 G: 20 SOLUTION ORAL at 01:12

## 2017-12-06 RX ADMIN — ALBUMIN (HUMAN) 25 G: 12.5 SOLUTION INTRAVENOUS at 02:12

## 2017-12-06 NOTE — COMMITTEE REVIEW
Marciejohn Bazan's case presented to selection committee.  Patient has been accepted for liver transplant due to Alcoholic Cirrhosis and complications of end stage liver disease including hyperbilirubinemia, coagulopathy, ascites, encephalopathy, jaundice, hyponatremia, portal hypertension, thrombocytopenia and pancytopenia with a MELD score of 31.  Patient has no absolute contraindications for liver transplant.  Patient will be listed pending review of mammogram films and then pending financial approval.    Patient will accept HBcAb positive livers.  Patient will not accept HCVAB positive livers.  Patient will accept DCD livers.  Patient will not accept HCV MODESTO positive livers  Patient will accept HBV MODESTO positive livers  I was present at the committee meeting and attest to the decision of the committee.    Eleuterio Acharyaondcruz  12/06/2017

## 2017-12-06 NOTE — PLAN OF CARE
12/05/17 0935   Discharge Reassessment   Assessment Type Discharge Planning Reassessment   Provided patient/caregiver education on the expected discharge date and the discharge plan Yes   Do you have any problems affording any of your prescribed medications? No   Discharge Plan A Home Health;Rehab   Discharge Plan B Home with family;Home Health   Can the patient answer the patient profile reliably? (mentation waxes and wanes)   How does the patient rate their overall health at the present time? Fair   Describe the patient's ability to walk at the present time. Major restrictions/daily assistance from another person   How often would a person be available to care for the patient? Often   Number of comorbid conditions (as recorded on the chart) Five or more   During the past month, has the patient often been bothered by feeling down, depressed or hopeless? No   During the past month, has the patient often been bothered by little interest or pleasure in doing things? No

## 2017-12-06 NOTE — SUBJECTIVE & OBJECTIVE
Interval History: C-scope done today and showed large hemorrhoids and anal fissure; will be presented tomorrow; needs outside mammogram reports;      Review of Systems   Constitutional: Negative for chills and fever.   HENT: Negative for rhinorrhea and sore throat.    Eyes: Negative for pain and discharge.   Respiratory: Negative for cough and shortness of breath.    Cardiovascular: Negative for chest pain and leg swelling.   Gastrointestinal: Positive for abdominal distention. Negative for abdominal pain, blood in stool, diarrhea, nausea and vomiting.   Endocrine: Negative for cold intolerance and heat intolerance.   Genitourinary: Negative for dysuria and flank pain.   Neurological: Negative for dizziness and numbness.   Psychiatric/Behavioral: Negative for behavioral problems and confusion.     Objective:     Vital Signs (Most Recent):  Temp: 98.5 °F (36.9 °C) (12/06/17 0104)  Pulse: 80 (12/06/17 0104)  Resp: 18 (12/06/17 0104)  BP: (!) 77/39 (12/06/17 0104)  SpO2: 100 % (12/06/17 0104) Vital Signs (24h Range):  Temp:  [97.6 °F (36.4 °C)-99.1 °F (37.3 °C)] 98.5 °F (36.9 °C)  Pulse:  [80-90] 80  Resp:  [16-25] 18  SpO2:  [90 %-100 %] 100 %  BP: ()/(39-65) 77/39     Weight: 69.6 kg (153 lb 7 oz)  Body mass index is 25.53 kg/m².    Intake/Output Summary (Last 24 hours) at 12/06/17 0157  Last data filed at 12/05/17 1330   Gross per 24 hour   Intake          1251.25 ml   Output                0 ml   Net          1251.25 ml      Physical Exam   Constitutional: She appears well-developed and well-nourished.   HENT:   Head: Normocephalic and atraumatic.   Eyes: Conjunctivae are normal. Pupils are equal, round, and reactive to light. Scleral icterus is present.   Neck: Normal range of motion. No thyromegaly present.   Cardiovascular: Normal rate, regular rhythm and normal heart sounds.    Pulmonary/Chest: Effort normal and breath sounds normal.   Abdominal: Soft. She exhibits distension. There is no tenderness.    Musculoskeletal: Normal range of motion. She exhibits no edema.   Neurological: She is alert.   AAO times 2   Skin: No erythema. No pallor.       MELD-Na score: 30 at 12/5/2017  4:52 PM  MELD score: 28 at 12/5/2017  4:52 PM  Calculated from:  Serum Creatinine: 0.8 mg/dL (Rounded to 1) at 12/5/2017  4:52 PM  Serum Sodium: 133 mmol/L at 12/5/2017  4:52 PM  Total Bilirubin: 26.5 mg/dL at 12/5/2017  4:52 PM  INR(ratio): 2.3 at 12/5/2017  4:19 AM  Age: 52 years    Significant Labs:  CBC:    Recent Labs  Lab 12/04/17  0414 12/05/17 0419   WBC 9.78 9.21   HGB 8.3* 8.1*   HCT 23.2* 22.3*   PLT 44* 44*     CMP:    Recent Labs  Lab 12/04/17  1603 12/05/17  0419 12/05/17  1652   * 131* 133*   K 3.2* 3.2* 3.2*   CL 96 94* 94*   CO2 26 24 26   * 87 86   BUN 9 9 10   CREATININE 0.8 0.7 0.8   CALCIUM 9.2 8.9 9.3   PROT 6.0 5.8* 6.1   ALBUMIN 4.1 4.0 3.9   BILITOT 24.9* 22.9* 26.5*   ALKPHOS 158* 144* 148*   AST 78* 65* 81*   ALT 80* 71* 79*   ANIONGAP 10 13 13   EGFRNONAA >60.0 >60.0 >60.0     PTINR:    Recent Labs  Lab 12/04/17 0414 12/05/17 0419   INR 3.1* 2.3*       Significant Procedures:   Dobutamine Stress Test with Color Flow:   Results for orders placed or performed during the hospital encounter of 10/19/17   Dobutamine Stress Test w/ Color Flow   Result Value Ref Range    EF 73 55 - 65    Diastolic Dysfunction No     Est. PA Systolic Pressure 21.32     Tricuspid Valve Regurgitation TRIVIAL

## 2017-12-06 NOTE — PROGRESS NOTES
[facility] Hospital Medicine  Progress Note    Patient Name: Marcie Bazan  MRN: [id number]  Patient Class: IP- Inpatient   Admission Date: 11/23/2017  Length of Stay: 13 days  Attending Physician: Burton Vora MD  Primary Care Provider: Milton Ferrer, [facility] Medicine Team: [facility] HOSP MED A Burton Vora MD    Subjective:     Principal Problem:Decompensated hepatic cirrhosis    HPI:  52 year old female with PMHx of 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 Patient presented to [facility] with generalized weakness. Onset two months ago, progressively worsening. Was admitted to  11/10 for hyponatremia seen on labs in GI office. Discharged on 11/15 and returned on 11/20 with 1 week worsening weakness, malaise, lethargy, and decreased appetite. Denied fever/chills, abdominal pain, bright red or black tarry stools, hematemesis or other abnormal bleeding, confusion or disorientation, changes in urination, light-headedness, headache, changes in vision, dyspnea, chest pain or palpitations. Labs demonstrated Na 116 and MELD 32; patient admitted for hyponatremia. Nephrology consulted, recommended fluid restriction , holding diuretics, and prednisode taper that started 11/23 (decreased from 40mg QD to 20mg Qd.) Patient transferred to [facility] for revaluation of liver disease. Of note, paracentesis performed 11/22, removed 2L, negative for SBP.    Follows with Dr. Souza for cirrhosis. Since early 09/2017, patient's liver failure progressing indicated by worsening clinical signs (abdominal distention, weakness/fatigue) and MELD. In mid-October, patient's PETH demonstrated EtOH use and she was denied candidacy for liver transplant. She was then enrolled in in-patient substance abuse program at [facility] where she has remained since early  November; per patient, her last EtOH beverage was 11/01/2017. Denies history of GI bleed, SBP, renal disease, or other complications of cirrhosis. Liver biopsy 10/24 demonstrated stage 4 cirrhosis. Records reports EGD in August 2016 that showed small esophageal varices with severe gastritis. Colonoscopy 2015 with 2 polyps and internal hemorrhoids Started on prednisone 40mg QD on 10/25; was on spironolactone, has been held since 11/10 for hypoNa; on lactulose.    Hospital Course:  Mental status improving with hepatic encephalopathy treatment. Started empiric ceftriaxone given leukocytosis in immunocompromised state. CMV PCR positive overnight, for which ID was consulted, recommended repeating titers in 1 week. Seen by Psychiatry and  who determined she was moderate risk for Liver transplant. Will proceed with EGD/Cscope and mammogram after FFP. Colonoscopy not performed due to poor prep, EGD revealed large varices which were banded. IR paracentesis ordered after patient started complaining of abdominal pain. Mammogram performed, IR paracentesis after INR Reversal.       Interval History: C-scope done today and showed large hemorrhoids and anal fissure; will be presented tomorrow; needs outside mammogram reports;      Review of Systems   Constitutional: Negative for chills and fever.   HENT: Negative for rhinorrhea and sore throat.    Eyes: Negative for pain and discharge.   Respiratory: Negative for cough and shortness of breath.    Cardiovascular: Negative for chest pain and leg swelling.   Gastrointestinal: Positive for abdominal distention. Negative for abdominal pain, blood in stool, diarrhea, nausea and vomiting.   Endocrine: Negative for cold intolerance and heat intolerance.   Genitourinary: Negative for dysuria and flank pain.   Neurological: Negative for dizziness and numbness.   Psychiatric/Behavioral: Negative for behavioral problems and confusion.     Objective:     Vital Signs (Most  Recent):  Temp: 98.5 °F (36.9 °C) (12/06/17 0104)  Pulse: 80 (12/06/17 0104)  Resp: 18 (12/06/17 0104)  BP: (!) 77/39 (12/06/17 0104)  SpO2: 100 % (12/06/17 0104) Vital Signs (24h Range):  Temp:  [97.6 °F (36.4 °C)-99.1 °F (37.3 °C)] 98.5 °F (36.9 °C)  Pulse:  [80-90] 80  Resp:  [16-25] 18  SpO2:  [90 %-100 %] 100 %  BP: ()/(39-65) 77/39     Weight: 69.6 kg (153 lb 7 oz)  Body mass index is 25.53 kg/m².    Intake/Output Summary (Last 24 hours) at 12/06/17 0157  Last data filed at 12/05/17 1330   Gross per 24 hour   Intake          1251.25 ml   Output                0 ml   Net          1251.25 ml      Physical Exam   Constitutional: She appears well-developed and well-nourished.   HENT:   Head: Normocephalic and atraumatic.   Eyes: Conjunctivae are normal. Pupils are equal, round, and reactive to light. Scleral icterus is present.   Neck: Normal range of motion. No thyromegaly present.   Cardiovascular: Normal rate, regular rhythm and normal heart sounds.    Pulmonary/Chest: Effort normal and breath sounds normal.   Abdominal: Soft. She exhibits distension. There is no tenderness.   Musculoskeletal: Normal range of motion. She exhibits no edema.   Neurological: She is alert.   AAO times 2   Skin: No erythema. No pallor.       MELD-Na score: 30 at 12/5/2017  4:52 PM  MELD score: 28 at 12/5/2017  4:52 PM  Calculated from:  Serum Creatinine: 0.8 mg/dL (Rounded to 1) at 12/5/2017  4:52 PM  Serum Sodium: 133 mmol/L at 12/5/2017  4:52 PM  Total Bilirubin: 26.5 mg/dL at 12/5/2017  4:52 PM  INR(ratio): 2.3 at 12/5/2017  4:19 AM  Age: 52 years    Significant Labs:  CBC:    Recent Labs  Lab 12/04/17  0414 12/05/17  0419   WBC 9.78 9.21   HGB 8.3* 8.1*   HCT 23.2* 22.3*   PLT 44* 44*     CMP:    Recent Labs  Lab 12/04/17  1603 12/05/17  0419 12/05/17  1652   * 131* 133*   K 3.2* 3.2* 3.2*   CL 96 94* 94*   CO2 26 24 26   * 87 86   BUN 9 9 10   CREATININE 0.8 0.7 0.8   CALCIUM 9.2 8.9 9.3   PROT 6.0 5.8* 6.1    ALBUMIN 4.1 4.0 3.9   BILITOT 24.9* 22.9* 26.5*   ALKPHOS 158* 144* 148*   AST 78* 65* 81*   ALT 80* 71* 79*   ANIONGAP 10 13 13   EGFRNONAA >60.0 >60.0 >60.0     PTINR:    Recent Labs  Lab 12/04/17  0414 12/05/17  0419   INR 3.1* 2.3*       Significant Procedures:   Dobutamine Stress Test with Color Flow:   Results for orders placed or performed during the hospital encounter of 10/19/17   Dobutamine Stress Test w/ Color Flow   Result Value Ref Range    EF 73 55 - 65    Diastolic Dysfunction No     Est. PA Systolic Pressure 21.32     Tricuspid Valve Regurgitation TRIVIAL             Assessment/Plan:      * Decompensated hepatic cirrhosis    MELD-Na score: 30 at 12/5/2017  4:52 PM  MELD score: 28 at 12/5/2017  4:52 PM  Calculated from:  Serum Creatinine: 0.8 mg/dL (Rounded to 1) at 12/5/2017  4:52 PM  Serum Sodium: 133 mmol/L at 12/5/2017  4:52 PM  Total Bilirubin: 26.5 mg/dL at 12/5/2017  4:52 PM  INR(ratio): 2.3 at 12/5/2017  4:19 AM  Age: 52 years   - had half of her evaluation completed on last admission  - AS per Psychiatry patient is moderate risk, will proceed with Liver transplant evaluation  - EGD performed, esophageal varices banded,   - Mammogram performed on 12/4/17   - C-scope repeated on 12/5- good prep  - presentation tomorrow         Transplant donor evaluation    To be presented at Liver selection committee on 12/06/17          Immunosuppressed status    On chronic prednisone for alcoholic hepatitis; currently 15 mg, would go down by 5 weekly, next change would be 10 mg this saturday          Coagulopathy    INR 3.5 will give IV Vitamin K          Alcoholic cirrhosis of liver with ascites    - see decompensated cirrhosis           Cytomegalovirus (CMV) viremia    -- ID evaluated and do not feel it needs to be treated        Thrombocytopenia    -- monitor           Abnormal liver enzymes    -- Stable  -- Etiology liver failure; see that section for more details          Alcoholic hepatitis with  ascites- decompensated with elevated MELD    - cont tapering prednisone, currently at 5 mg daily         Hyponatremia    - on 800 cc fluid restriction; on albumin  - 116-->118-->120-->122-->123-->126-->125-->127-->131-->133        End stage liver disease              HCC (hepatocellular carcinoma)    AFP 9; seen on CT on 10/19, 1 cm focus, monitor for now          Hepatic encephalopathy    -- Improving, though not yet returned to baseline, AAO times 3; put out 1 L in flexi-seal  -- Continue lactulose and rifaximin          Severe protein-calorie malnutrition    PAB, boost and dietary consult          Ascites    -- Holding diuretics at present, s/p 2L removed on 11/22 with testing negative for SBP  -- No abdominal pain today              Essential hypertension    - Stable  - Holding home lisinopril in setting of decompensated liver disease          Anemia of chronic disease    - monitor daily            VTE Risk Mitigation         Ordered     Place sequential compression device  Until discontinued      11/24/17 0734     Place FABIOLA hose  Until discontinued      11/23/17 2305     Medium Risk of VTE  Once      11/23/17 2305              Burton Vora MD  Department of Hospital Medicine   [facility]

## 2017-12-06 NOTE — ASSESSMENT & PLAN NOTE
MELD-Na score: 30 at 12/5/2017  4:52 PM  MELD score: 28 at 12/5/2017  4:52 PM  Calculated from:  Serum Creatinine: 0.8 mg/dL (Rounded to 1) at 12/5/2017  4:52 PM  Serum Sodium: 133 mmol/L at 12/5/2017  4:52 PM  Total Bilirubin: 26.5 mg/dL at 12/5/2017  4:52 PM  INR(ratio): 2.3 at 12/5/2017  4:19 AM  Age: 52 years   - had half of her evaluation completed on last admission  - AS per Psychiatry patient is moderate risk, will proceed with Liver transplant evaluation  - EGD performed, esophageal varices banded,   - Mammogram performed on 12/4/17   - C-scope repeated on 12/5- good prep  - presentation tomorrow

## 2017-12-06 NOTE — ANESTHESIA POSTPROCEDURE EVALUATION
"Anesthesia Post Evaluation    Patient: Marcie Bazan    Procedure(s) Performed: Procedure(s) (LRB):  COLONOSCOPY (N/A)    Final Anesthesia Type: general  Patient location during evaluation: PACU  Patient participation: Yes- Able to Participate  Level of consciousness: awake and alert  Post-procedure vital signs: reviewed and stable  Pain management: adequate  Airway patency: patent  PONV status at discharge: No PONV  Anesthetic complications: no      Cardiovascular status: blood pressure returned to baseline  Respiratory status: unassisted  Hydration status: euvolemic  Follow-up not needed.        Visit Vitals  /67 (BP Location: Right arm, Patient Position: Lying)   Pulse 75   Temp 36.5 °C (97.7 °F) (Oral)   Resp 18   Ht 5' 5" (1.651 m)   Wt 69.6 kg (153 lb 7 oz)   LMP 01/01/1985 (Approximate)   SpO2 100%   Breastfeeding? No   BMI 25.53 kg/m²       Pain/Larry Score: Pain Assessment Performed: Yes (12/6/2017  6:00 AM)  Presence of Pain: complains of pain/discomfort (12/6/2017  6:00 AM)  Pain Rating Prior to Med Admin: 5 (12/6/2017  6:00 AM)  Pain Rating Post Med Admin: 5 (12/6/2017  6:00 AM)  Larry Score: 10 (12/5/2017  2:30 PM)      "

## 2017-12-06 NOTE — PROGRESS NOTES
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 Patient presented to [facility] with generalized weakness and was admitted to  11/10 for hyponatremia seen on labs in GI office. Discharged on 11/15 and returned on 11/20 with 1 week worsening weakness, malaise, lethargy, and decreased appetite. Denied fever/chills, abdominal pain, bright red or black tarry stools, hematemesis or other abnormal bleeding, confusion or disorientation, changes in urination, light-headedness, headache, changes in vision, dyspnea, chest pain or palpitations. Labs demonstrated Na 116 and MELD 32; patient admitted for hyponatremia. Nephrology consulted, recommended fluid restriction , holding diuretics, and prednisode taper that started 11/23 (decreased from 40mg QD to 20mg Qd.) Patient transferred to [facility] for revaluation of liver disease. Of note, paracentesis performed 11/22, removed 2L, negative for SBP.     Follows with Dr. Souza for cirrhosis. Since early 09/2017, patient's liver failure progressing indicated by worsening clinical signs (abdominal distention, weakness/fatigue) and MELD. She had been [place] [facility] [place]25[place]2017 and declined transplant candidacy 2/2 continued EtOH use and lack of solid caregiver support/plan.She has since been enrolled in in-patient substance abuse program at [facility] where she has remained since early November; per patient, her last EtOH beverage was 11/01/2017.   "Per chart review on 11/22/17:     "Patient's case was discussed in liver selection committee today. Per committee discussion, the SW will call patient's daughter to discuss caregiver plan at this time. If the caregiver plan is adequate, patient will need to follow up with either/both addiction psych or SW regarding substance abuse and plan. May need to do this inpatient if patient is still admitted."        Denies history of GI bleed, SBP, renal disease, or other complications of cirrhosis. Liver biopsy 10/24 demonstrated stage 4 cirrhosis. Records reports EGD in August 2016 that showed small esophageal varices with severe gastritis. Colonoscopy 2015 with 2 polyps and internal hemorrhoids Started on prednisone 40mg QD on 10/25; was on spironolactone, has been held since 11/10 for hypoNa; on lactulose.    Interval History:   AOX3  Completed workup. Pending clarification of mammo - ?calcification  Pending paracentesis  To be presented to LT committee.     Current Facility-Administered Medications   Medication    0.9%  NaCl infusion (for blood administration)    cefTRIAXone (ROCEPHIN) 1 g in dextrose 5 % 50 mL IVPB    lactulose 20 gram/30 mL solution Soln 20 g    midodrine tablet 5 mg    ondansetron disintegrating tablet 4 mg    ondansetron injection 4 mg    pantoprazole EC tablet 40 mg    phytonadione vitamin k (AQUA-MEPHYTON) 5 mg in dextrose 5 % 50 mL IVPB    potassium bicarbonate disintegrating tablet 50 mEq    predniSONE tablet 10 mg    Followed by    [START ON 12/9/2017] predniSONE tablet 5 mg    rifAXIMin tablet 550 mg    simethicone chewable tablet 80 mg    sodium chloride 0.9% flush 3 mL    sodium chloride 0.9% flush 3 mL       Objective:     Vital Signs (Most Recent):  Temp: 98.8 °F (37.1 °C) (12/06/17 0910)  Pulse: 75 (12/06/17 0910)  Resp: 18 (12/06/17 0910)  BP: (!) 113/57 (12/06/17 0910)  SpO2: 97 % (12/06/17 0910) Vital Signs (24h Range):  Temp:  [97.7 °F (36.5 °C)-98.8 °F (37.1 °C)] 98.8 " °F (37.1 °C)  Pulse:  [75-90] 75  Resp:  [16-25] 18  SpO2:  [90 %-100 %] 97 %  BP: ()/(28-65) 113/57     Weight: 69.6 kg (153 lb 7 oz) (12/05/17 1300)  Body mass index is 25.53 kg/m².    Physical Exam   Constitutional: She is oriented to person, place, and time. She appears ill.   Eyes: Scleral icterus is present.   Cardiovascular: Normal rate.  Exam reveals no friction rub.    Pulmonary/Chest: Effort normal. No respiratory distress.   Abdominal: Soft. Bowel sounds are normal. She exhibits distension. There is no tenderness. There is no rebound and no guarding.   Musculoskeletal: She exhibits edema.   Neurological: She is alert and oriented to person, place, and time.       MELD-Na score: 31 at 12/6/2017  5:38 AM  MELD score: 30 at 12/6/2017  5:38 AM  Calculated from:  Serum Creatinine: 0.8 mg/dL (Rounded to 1) at 12/6/2017  5:38 AM  Serum Sodium: 134 mmol/L at 12/6/2017  5:38 AM  Total Bilirubin: 24.0 mg/dL at 12/6/2017  5:38 AM  INR(ratio): 2.7 at 12/6/2017  5:38 AM  Age: 52 years    Lab Results   Component Value Date    WBC 7.83 12/06/2017    HGB 7.9 (L) 12/06/2017    HCT 22.3 (L) 12/06/2017    MCV 86 12/06/2017    PLT 51 (L) 12/06/2017     Lab Results   Component Value Date    INR 2.7 (H) 12/06/2017     Lab Results   Component Value Date     (L) 12/06/2017    K 2.6 (LL) 12/06/2017    CREATININE 0.8 12/06/2017     Lab Results   Component Value Date    ALBUMIN 3.8 12/06/2017    ALT 65 (H) 12/06/2017    AST 59 (H) 12/06/2017     (H) 10/20/2017    ALKPHOS 129 12/06/2017    BILITOT 24.0 (H) 12/06/2017     Lab Results   Component Value Date    AFP 9.0 (H) 11/24/2017       Imaging:  Reviewed and as noted in HPI.               Assessment/Plan:     * Decompensated hepatic cirrhosis    # Decompensated Cirrhosis due to alcohol, in remission.  # Ascites  # Hepatic encephalopathy  # Esophageal varices - banded during this admission.  # Anemia - multifactorial  # HCC - 1cm liver mass on tpCT on 10/20/17  #  Liver transplant candidacy - Completed workup.   # Addiction risk - was doing inpatient rehab. Seen by addiction psych, thought to have improved insight.  Last EtOH beverage was 11/01/2017    Plan:  Pending clarification of mammo - ?calcification  Pending paracentesis  To be presented to LT committee.     General recommendations:  · Daily CBC, CMP and INR for MELD calculation  · Frequent finger stick blood sugar check if active hepatic encepahalopathy  · Avoid narcotics and sedatives  · Low Na diet              Thank you for your consult. I will follow-up with patient. Please contact us if you have any additional questions.    Ginna Ren MD  Hepatology  [facility]

## 2017-12-06 NOTE — ANESTHESIA RELEASE NOTE
"Anesthesia Release from PACU Note    Patient: Marcie Bazan    Procedure(s) Performed: Procedure(s) (LRB):  COLONOSCOPY (N/A)    Anesthesia type: general    Post pain: Adequate analgesia    Post assessment: no apparent anesthetic complications    Last Vitals:   Visit Vitals  /67 (BP Location: Right arm, Patient Position: Lying)   Pulse 75   Temp 36.5 °C (97.7 °F) (Oral)   Resp 18   Ht 5' 5" (1.651 m)   Wt 69.6 kg (153 lb 7 oz)   LMP 01/01/1985 (Approximate)   SpO2 100%   Breastfeeding? No   BMI 25.53 kg/m²       Post vital signs: stable    Level of consciousness: awake, alert  and oriented    Nausea/Vomiting: no nausea/no vomiting    Complications: none    Airway Patency: patent    Respiratory: unassisted    Cardiovascular: stable and blood pressure at baseline    Hydration: euvolemic       "

## 2017-12-06 NOTE — PROCEDURES
Radiology Post-Procedure Note    Pre Op Diagnosis: Ascites  Post Op Diagnosis: Same    Procedure: Paracentesis    Procedure performed by: Wesley Figueroa MD and Stevenson Sommer MD     Following informed consent, the patient underwent sterile preparation and 1% lidocaine for local anesthesia. Ultrasound guidance was used to gain peritoneal access via a micropuncture needle with subsequent tract dilation and insertion of a 5-Maltese Yueh catheter into the peritoneal space. 6.2L of yellow-colored fluid was successfully removed with no complications.    Written Informed Consent Obtained: Yes  Specimen Removed: YES; approximately 20 cc's of ascites collected for laboratory analysis  Estimated Blood Loss: Minimal    Findings:   Successful paracentesis.  Albumin administered PRN per protocol.    Patient tolerated procedure well.    Wesley Figueroa M.D.  PGY-2 Radiology  Pager# [phone]

## 2017-12-06 NOTE — PROGRESS NOTES
Paracentesis complete, 6.2L removed, pt tolerates well. DSD applied to right ABD. Labs sent, albumin adm per order.  Report called to Dorothy.  Pt awaits transport.

## 2017-12-06 NOTE — PLAN OF CARE
Problem: Occupational Therapy Goal  Goal: Occupational Therapy Goal  Goals to be met by: 12/16/17     Patient will increase functional independence with ADLs by performing:    UE Dressing with Set-up Assistance and Supervision.  LE Dressing with Minimal Assistance.    MET 12/6/2017 at S[facility]; revised: supervision  Grooming while standing at sink with Contact Guard Assistance.  Toileting from toilet with Stand-by Assistance for hygiene and clothing management.   Supine to sit with Supervision.   MET 12/6/2017  Stand pivot transfers with Contact Guard Assistance.    MET 12/6/2017, Revised: Supervision  Bathing while EOB with Setup Assistance and Supervision.  Toilet transfer to toilet with Contact Guard Assistance.      Outcome: Ongoing (interventions implemented as appropriate)  Goals remain appropriate. Cont POC.    JA Potts  12/6/2017  Pager: [phone]

## 2017-12-06 NOTE — PT/OT/SLP PROGRESS
Occupational Therapy   Treatment    Name: Marcie Bazan  MRN: [id number]  Admitting Diagnosis:  Decompensated hepatic cirrhosis  1 Day Post-Op    Recommendations:     Discharge Recommendations: Inpatient Rehab  Discharge Equipment Recommendations:  bedside commode, walker, rolling, shower chair  Barriers to discharge:  Decreased caregiver support    Subjective     Communicated with: RN prior to session.  Pain/Comfort:  · Pain Rating 1: 0/10  · Pain Rating Post-Intervention 1: 0/10    Objective:     Patient found with:  (none)    General Precautions: Standard, fall   Orthopedic Precautions:    Braces:       Occupational Performance:    Bed Mobility:    · Patient completed Supine to Sit with supervision with verbal cues to use side rail    Functional Mobility/Transfers:  · Patient completed Sit <> Stand Transfer with stand by assistance  with  4 wheeled walker   · Patient completed Bed <> Chair Transfer using Stand Pivot technique with contact guard assistance with 4 wheeled walker    Activities of Daily Living:  · LB Dressing: stand by assistance donned pants and sock; pt donned 2 pants, but 2nd pair was part of a pajama set and was actually the pajama top; pt realized she donned the incorrect item, and was able to correct.    Patient left on stretcher with all lines intact, call button in reach and RN and transport team present    [facility] 6 Click:  [facility] Total Score: 20    Treatment & Education:  Pt ed re OT role and POC. Pt performed supine to sit with S and v/cs. Pt able to don pants and socks with SBA, and SBA for sit to stand t/f with Rollator. Pt able to ambulate to stretcher with CGA and Rollator.   Education:    Assessment:     Marcie Bazan is a 52 y.o. female with a medical diagnosis of Decompensated hepatic cirrhosis.  She presents with some confusion with initiation and sequencing of tasks and mobility, but demo improved strength, endurance and balance overall.  Performance deficits affecting function are  weakness, impaired endurance, impaired self care skills, impaired functional mobilty, impaired balance, decreased safety awareness.  Pt is motivated and participates well.    Rehab Prognosis:  Good; patient would benefit from acute skilled OT services to address these deficits and reach maximum level of function.       Plan:     Patient to be seen 3 x/week to address the above listed problems via self-care/home management, therapeutic activities, therapeutic exercises, cognitive retraining  · Plan of Care Expires: 12/25/17  · Plan of Care Reviewed with: patient, spouse    This Plan of care has been discussed with the patient who was involved in its development and understands and is in agreement with the identified goals and treatment plan    GOALS:    Occupational Therapy Goals        Problem: Occupational Therapy Goal    Goal Priority Disciplines Outcome Interventions   Occupational Therapy Goal     OT, PT/OT Ongoing (interventions implemented as appropriate)    Description:  Goals to be met by: 12/16/17     Patient will increase functional independence with ADLs by performing:    UE Dressing with Set-up Assistance and Supervision.  LE Dressing with Minimal Assistance.    MET 12/6/2017 at S[facility]; revised: supervision  Grooming while standing at sink with Contact Guard Assistance.  Toileting from toilet with Stand-by Assistance for hygiene and clothing management.   Supine to sit with Supervision.   MET 12/6/2017  Stand pivot transfers with Contact Guard Assistance.    MET 12/6/2017, Revised: Supervision  Bathing while EOB with Setup Assistance and Supervision.  Toilet transfer to toilet with Contact Guard Assistance.                        Time Tracking:     OT Date of Treatment: 12/06/17  OT Start Time: 1010  OT Stop Time: 1033  OT Total Time (min): 23 min    Billable Minutes:Self Care/Home Management 23 minutes    JA Potts  12/6/2017  Pager: [phone]

## 2017-12-06 NOTE — H&P
Inpatient Radiology Pre-procedure Note    History of Present Illness:  Marcie Bazan is a 52 y.o. female who presents for diagnostic/therapeutic paracentesis.  Admission H&P reviewed.  Past Medical History:   Diagnosis Date    Alcohol abuse     Alcoholic hepatitis     Anemia of chronic disease     Coagulopathy     End stage liver disease     Hypertension     Hyponatremia     Liver cirrhosis     Liver transplant candidate      Past Surgical History:   Procedure Laterality Date    BREAST BIOPSY      CHOLECYSTECTOMY      COLONOSCOPY N/A 12/1/2017    Procedure: COLONOSCOPY;  Surgeon: Filemon Fuentes MD;  [facility]  Service: Endoscopy;  Laterality: N/A;    COLONOSCOPY N/A 12/5/2017    Procedure: COLONOSCOPY;  Surgeon: José Chavira MD;  [facility]  Service: Endoscopy;  Laterality: N/A;    HYSTERECTOMY         Review of Systems:   As documented in primary team H&P    Home Meds:   Prior to Admission medications    Medication Sig Start Date End Date Taking? Authorizing Provider   cetirizine (ZYRTEC) 10 MG tablet Take 10 mg by mouth once daily. 2/9/17 2/9/18 Yes Historical Provider, MD   furosemide (LASIX) 40 MG tablet Take 40 mg by mouth daily as needed (for fluid).   Yes Historical Provider, MD   lactulose (CHRONULAC) 10 gram/15 mL solution Take 45 ml by mouth four times daily 11/7/17  Yes Historical Provider, MD   omeprazole (PRILOSEC) 40 MG capsule Take 40 mg by mouth once daily. 3/8/17  Yes Historical Provider, MD   ondansetron (ZOFRAN-ODT) 4 MG TbDL Take 1 tablet (4 mg total) by mouth every 8 (eight) hours as needed (nausea and vomiting). 9/15/17  Yes Bisi Jonas NP   potassium chloride SA (K-DUR,KLOR-CON) 20 MEQ tablet Taking only when taking lasix 11/15/17  Yes Vira Amador NP   predniSONE (DELTASONE) 20 MG tablet Take 40 mg by mouth once daily.   Yes Historical Provider, MD   lisinopril (PRINIVIL,ZESTRIL) 5 MG tablet Take 1 tablet by mouth  only as needed for high blood pressure 6/8/17   Historical Provider, MD   spironolactone (ALDACTONE) 100 MG tablet Hold until follow-up with GI 11/15/17   Vira Amador NP     Scheduled Meds:    cefTRIAXone (ROCEPHIN) IVPB  1 g Intravenous Q24H    lactulose  20 g Oral TID    midodrine  5 mg Oral TID    pantoprazole  40 mg Oral Daily    phytonadione ((AQUA-MEPHYTON) IVPB  5 mg Intravenous Daily    potassium bicarbonate  50 mEq Oral Q2H    predniSONE  10 mg Oral Daily    Followed by    [START ON 12/9/2017] predniSONE  5 mg Oral Daily    rifAXImin  550 mg Oral BID     Continuous Infusions:    PRN Meds:sodium chloride, ondansetron, ondansetron, simethicone, sodium chloride 0.9%, sodium chloride 0.9%  Anticoagulants/Antiplatelets: no anticoagulation    Allergies:   Review of patient's allergies indicates:   Allergen Reactions    Ferrous sulfate Other (See Comments)     Patient states the pill makes her sick. She stated she would rather have a shot     Sedation Hx: have not been any systemic reactions    Labs:    Recent Labs  Lab 12/06/17  0538   INR 2.7*       Recent Labs  Lab 12/06/17 0538   WBC 7.83   HGB 7.9*   HCT 22.3*   MCV 86   PLT 51*      Recent Labs  Lab 12/06/17  0538   GLU 83   *   K 2.6*   CL 96   CO2 27   BUN 10   CREATININE 0.8   CALCIUM 9.1   MG 1.6   ALT 65*   AST 59*   ALBUMIN 3.8   BILITOT 24.0*         Vitals:  Temp: 98.8 °F (37.1 °C) (12/06/17 0910)  Pulse: 75 (12/06/17 0910)  Resp: 18 (12/06/17 0910)  BP: (!) 113/57 (12/06/17 0910)  SpO2: 97 % (12/06/17 0910)     Physical Exam:  ASA: 3  Mallampati: 2    General: no acute distress  Mental Status: alert and oriented to person, place and time  HEENT: normocephalic, atraumatic  Chest: unlabored breathing  Heart: regular heart rate  Abdomen: distended  Extremity: moves all extremities    Plan: US-guided diagnostic/therapeutic paracentesis  Sedation Plan: Local    Wesley Figueroa M.D.  PGY-2 Radiology  Pager# [phone]

## 2017-12-06 NOTE — SUBJECTIVE & OBJECTIVE
Interval History:   AOX3  Completed workup. Pending clarification of mammo - ?calcification  Pending paracentesis  To be presented to LT committee.     Current Facility-Administered Medications   Medication    0.9%  NaCl infusion (for blood administration)    cefTRIAXone (ROCEPHIN) 1 g in dextrose 5 % 50 mL IVPB    lactulose 20 gram/30 mL solution Soln 20 g    midodrine tablet 5 mg    ondansetron disintegrating tablet 4 mg    ondansetron injection 4 mg    pantoprazole EC tablet 40 mg    phytonadione vitamin k (AQUA-MEPHYTON) 5 mg in dextrose 5 % 50 mL IVPB    potassium bicarbonate disintegrating tablet 50 mEq    predniSONE tablet 10 mg    Followed by    [START ON 12/9/2017] predniSONE tablet 5 mg    rifAXIMin tablet 550 mg    simethicone chewable tablet 80 mg    sodium chloride 0.9% flush 3 mL    sodium chloride 0.9% flush 3 mL       Objective:     Vital Signs (Most Recent):  Temp: 98.8 °F (37.1 °C) (12/06/17 0910)  Pulse: 75 (12/06/17 0910)  Resp: 18 (12/06/17 0910)  BP: (!) 113/57 (12/06/17 0910)  SpO2: 97 % (12/06/17 0910) Vital Signs (24h Range):  Temp:  [97.7 °F (36.5 °C)-98.8 °F (37.1 °C)] 98.8 °F (37.1 °C)  Pulse:  [75-90] 75  Resp:  [16-25] 18  SpO2:  [90 %-100 %] 97 %  BP: ()/(28-65) 113/57     Weight: 69.6 kg (153 lb 7 oz) (12/05/17 1300)  Body mass index is 25.53 kg/m².    Physical Exam   Constitutional: She is oriented to person, place, and time. She appears ill.   Eyes: Scleral icterus is present.   Cardiovascular: Normal rate.  Exam reveals no friction rub.    Pulmonary/Chest: Effort normal. No respiratory distress.   Abdominal: Soft. Bowel sounds are normal. She exhibits distension. There is no tenderness. There is no rebound and no guarding.   Musculoskeletal: She exhibits edema.   Neurological: She is alert and oriented to person, place, and time.       MELD-Na score: 31 at 12/6/2017  5:38 AM  MELD score: 30 at 12/6/2017  5:38 AM  Calculated from:  Serum Creatinine: 0.8 mg/dL  (Rounded to 1) at 12/6/2017  5:38 AM  Serum Sodium: 134 mmol/L at 12/6/2017  5:38 AM  Total Bilirubin: 24.0 mg/dL at 12/6/2017  5:38 AM  INR(ratio): 2.7 at 12/6/2017  5:38 AM  Age: 52 years    Lab Results   Component Value Date    WBC 7.83 12/06/2017    HGB 7.9 (L) 12/06/2017    HCT 22.3 (L) 12/06/2017    MCV 86 12/06/2017    PLT 51 (L) 12/06/2017     Lab Results   Component Value Date    INR 2.7 (H) 12/06/2017     Lab Results   Component Value Date     (L) 12/06/2017    K 2.6 (LL) 12/06/2017    CREATININE 0.8 12/06/2017     Lab Results   Component Value Date    ALBUMIN 3.8 12/06/2017    ALT 65 (H) 12/06/2017    AST 59 (H) 12/06/2017     (H) 10/20/2017    ALKPHOS 129 12/06/2017    BILITOT 24.0 (H) 12/06/2017     Lab Results   Component Value Date    AFP 9.0 (H) 11/24/2017       Imaging:  Reviewed and as noted in HPI.

## 2017-12-06 NOTE — ASSESSMENT & PLAN NOTE
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  # Ascites  # Hepatic encephalopathy  # Esophageal varices - banded during this admission.  # Anemia - multifactorial  # HCC - 1cm liver mass on tpCT on 10/20/17  # Liver transplant candidacy - Completed workup.   # Addiction risk - was doing inpatient rehab. Seen by addiction psych, thought to have improved insight.     Plan:  Pending clarification of mammo - ?calcification  Pending paracentesis  To be presented to LT committee.     General recommendations:  · Daily CBC, CMP and INR for MELD calculation  · Frequent finger stick blood sugar check if active hepatic encepahalopathy  · Avoid narcotics and sedatives  · Low Na diet

## 2017-12-06 NOTE — PT/OT/SLP PROGRESS
"Physical Therapy Treatment    Patient Name:  Marcie Bazan   MRN:  [id number]    Recommendations:     Discharge Recommendations:  rehabilitation facility   Discharge Equipment Recommendations: bedside commode, walker, rolling, shower chair   Barriers to discharge: Decreased caregiver support    Assessment:     Marcie Bazan is a 52 y.o. female admitted with a medical diagnosis of Decompensated hepatic cirrhosis.  She presents with the following impairments/functional limitations:  weakness, impaired endurance, decreased coordination, decreased lower extremity function, gait instability, impaired balance, decreased safety awareness, impaired cardiopulmonary response to activity. Pt presents with improvement with gait tolerance to activity and ability to perform standing balance and therapeutic exercises in today's session. Pt appropriate for discharge to inpatient rehab to address current deficits and assist c/ performing mobility safely. Pt with fluctuating levels of assistance over past few sessions due to incr abdominal fluid.     Rehab Prognosis:  Good; patient would benefit from acute skilled PT services to address these deficits and reach maximum level of function.      Recent Surgery: Procedure(s) (LRB):  COLONOSCOPY (N/A) 1 Day Post-Op    Plan:     During this hospitalization, patient to be seen 3 x/week to address the above listed problems via gait training, therapeutic activities, therapeutic exercises, neuromuscular re-education  · Plan of Care Expires:  12/26/17   Plan of Care Reviewed with: patient, spouse    Subjective     Communicated with nsg prior to session.  Patient found seated edge of bed with  at bedside upon PT entry to room, agreeable to treatment.      Chief Complaint: discomfort with movement  Patient comments/goals: "I am tired. I just came back" per pt during session.   Pain/Comfort:  · Pain Rating 1: 0/10    Patients cultural, spiritual, L[employer] conflicts given the current " situation: none stated    Objective:         General Precautions: Standard, fall   Orthopedic Precautions:N/A   Braces: N/A     Functional Mobility:  · Bed Mobility:     · Sit to Supine: stand by assistance  · Transfers:     · Sit to Stand:  stand by assistance with 4 wheeled walker  · Gait: 90 ft; slowed lisbet with incr reliance on (B) UE for mobility. Pt required 2 minute standing rest break while amb. Req CGA for mobility.      AM-PAC 6 CLICK MOBILITY  Turning over in bed (including adjusting bedclothes, sheets and blankets)?: 3  Sitting down on and standing up from a chair with arms (e.g., wheelchair, bedside commode, etc.): 3  Moving from lying on back to sitting on the side of the bed?: 3  Moving to and from a bed to a chair (including a wheelchair)?: 3  Need to walk in hospital room?: 3  Climbing 3-5 steps with a railing?: 3  Total Score: 18       Therapeutic Activities and Exercises:   THERAPEUTIC EXERCISE  Pt performed therapeutic exercise standing and seated for 15 reps BLE   - strengthening:LAQ, hip flexion( seated) marches (standing)       PT encouraged pt to perform daily ambulation in hallway with nsg and family utilizing rollator for safety.     Patient left supine with all lines intact and call button in reach..    GOALS:    Physical Therapy Goals        Problem: Physical Therapy Goal    Goal Priority Disciplines Outcome Goal Variances Interventions   Physical Therapy Goal     PT/OT, PT Ongoing (interventions implemented as appropriate)     Description:  Goals to be met by: 17    Patient will increase functional independence with mobility by performin. Supine to sit with Set-up [place]  2. Sit to supine with Set-up [place]  3. Sit to stand transfer with Supervision  4. Bed to chair transfer with Supervision using Rolling Walker  5. Gait  x 100 feet with Stand-by Assistance using Rolling Walker.   6. Lower extremity exercise program x20 reps per handout, with independence                        Time Tracking:     PT Received On: 12/06/17  PT Start Time: 1350     PT Stop Time: 1415  PT Total Time (min): 25 min     Billable Minutes: Gait Training 15 and Therapeutic Activity 10    Treatment Type: Treatment  PT/PTA: PT     PTA Visit Number: 0     Fina Rendon PT, DPT  12/06/2017

## 2017-12-06 NOTE — PLAN OF CARE
Problem: Patient Care Overview  Goal: Plan of Care Review  Outcome: Ongoing (interventions implemented as appropriate)  Pt Aox4, pt VSS unstable. Pts Pt below 90/50. On call dr demarco) notified of assessment findings. Albumin IV was given and pt responded to treatment. Pt denies pain until 0430. Pt c/o of extreme throat pain, on call dr was notified, will administer treatment to pt once medication arrives. Pt tolerated Iv antibiotics well. Pt remains free of falls, will continue to monitor.

## 2017-12-06 NOTE — PLAN OF CARE
Problem: Physical Therapy Goal  Goal: Physical Therapy Goal  Goals to be met by: 17    Patient will increase functional independence with mobility by performin. Supine to sit with Set-up [place]  2. Sit to supine with Set-up [place]  3. Sit to stand transfer with Supervision  4. Bed to chair transfer with Supervision using Rolling Walker  5. Gait  x 100 feet with Stand-by Assistance using Rolling Walker.   6. Lower extremity exercise program x20 reps per handout, with independence     Outcome: Ongoing (interventions implemented as appropriate)  No goals met on today. Goals remain appropriate with pt progressing.    Fina Rendon, PT, DPT  2017

## 2017-12-07 ENCOUNTER — TELEPHONE (OUTPATIENT)
Dept: TRANSPLANT | Facility: HOSPITAL | Age: 52
End: 2017-12-07

## 2017-12-07 DIAGNOSIS — K70.31 ALCOHOLIC CIRRHOSIS OF LIVER WITH ASCITES: Primary | ICD-10-CM

## 2017-12-07 LAB
ALBUMIN SERPL BCP-MCNC: 3.5 G/DL
ALBUMIN SERPL BCP-MCNC: 3.6 G/DL
ALP SERPL-CCNC: 121 U/L
ALP SERPL-CCNC: 129 U/L
ALT SERPL W/O P-5'-P-CCNC: 48 U/L
ALT SERPL W/O P-5'-P-CCNC: 53 U/L
ANION GAP SERPL CALC-SCNC: 10 MMOL/L
ANION GAP SERPL CALC-SCNC: 8 MMOL/L
ANISOCYTOSIS BLD QL SMEAR: SLIGHT
AST SERPL-CCNC: 47 U/L
AST SERPL-CCNC: 57 U/L
BASO STIPL BLD QL SMEAR: ABNORMAL
BASOPHILS # BLD AUTO: 0.02 K/UL
BASOPHILS NFR BLD: 0.3 %
BILIRUB SERPL-MCNC: 22.5 MG/DL
BILIRUB SERPL-MCNC: 24.1 MG/DL
BUN SERPL-MCNC: 8 MG/DL
BUN SERPL-MCNC: 9 MG/DL
BURR CELLS BLD QL SMEAR: ABNORMAL
CALCIUM SERPL-MCNC: 8.9 MG/DL
CALCIUM SERPL-MCNC: 9 MG/DL
CHLORIDE SERPL-SCNC: 100 MMOL/L
CHLORIDE SERPL-SCNC: 97 MMOL/L
CO2 SERPL-SCNC: 27 MMOL/L
CO2 SERPL-SCNC: 30 MMOL/L
CREAT SERPL-MCNC: 0.7 MG/DL
CREAT SERPL-MCNC: 0.8 MG/DL
DIFFERENTIAL METHOD: ABNORMAL
EOSINOPHIL # BLD AUTO: 0 K/UL
EOSINOPHIL NFR BLD: 0.4 %
ERYTHROCYTE [DISTWIDTH] IN BLOOD BY AUTOMATED COUNT: 23.2 %
EST. GFR  (AFRICAN AMERICAN): >60 ML/MIN/1.73 M^2
EST. GFR  (AFRICAN AMERICAN): >60 ML/MIN/1.73 M^2
EST. GFR  (NON AFRICAN AMERICAN): >60 ML/MIN/1.73 M^2
EST. GFR  (NON AFRICAN AMERICAN): >60 ML/MIN/1.73 M^2
GLUCOSE SERPL-MCNC: 92 MG/DL
GLUCOSE SERPL-MCNC: 96 MG/DL
HCT VFR BLD AUTO: 22.5 %
HGB BLD-MCNC: 7.8 G/DL
HYPOCHROMIA BLD QL SMEAR: ABNORMAL
IMM GRANULOCYTES # BLD AUTO: 0.03 K/UL
IMM GRANULOCYTES NFR BLD AUTO: 0.4 %
INR PPP: 2.9
LYMPHOCYTES # BLD AUTO: 3 K/UL
LYMPHOCYTES NFR BLD: 39.8 %
MAGNESIUM SERPL-MCNC: 1.7 MG/DL
MCH RBC QN AUTO: 30.2 PG
MCHC RBC AUTO-ENTMCNC: 34.7 G/DL
MCV RBC AUTO: 87 FL
MONOCYTES # BLD AUTO: 0.6 K/UL
MONOCYTES NFR BLD: 8.2 %
NEUTROPHILS # BLD AUTO: 3.8 K/UL
NEUTROPHILS NFR BLD: 50.9 %
NRBC BLD-RTO: 0 /100 WBC
PHOSPHATE SERPL-MCNC: 1.5 MG/DL
PLATELET # BLD AUTO: 51 K/UL
PLATELET BLD QL SMEAR: ABNORMAL
PMV BLD AUTO: 10.3 FL
POIKILOCYTOSIS BLD QL SMEAR: SLIGHT
POLYCHROMASIA BLD QL SMEAR: ABNORMAL
POTASSIUM SERPL-SCNC: 3.1 MMOL/L
POTASSIUM SERPL-SCNC: 4.3 MMOL/L
PROT SERPL-MCNC: 5.2 G/DL
PROT SERPL-MCNC: 5.2 G/DL
PROTHROMBIN TIME: 29.1 SEC
RBC # BLD AUTO: 2.58 M/UL
SODIUM SERPL-SCNC: 135 MMOL/L
SODIUM SERPL-SCNC: 137 MMOL/L
WBC # BLD AUTO: 7.43 K/UL

## 2017-12-07 PROCEDURE — 36415 COLL VENOUS BLD VENIPUNCTURE: CPT

## 2017-12-07 PROCEDURE — 25000003 PHARM REV CODE 250: Performed by: STUDENT IN AN ORGANIZED HEALTH CARE EDUCATION/TRAINING PROGRAM

## 2017-12-07 PROCEDURE — 99233 SBSQ HOSP IP/OBS HIGH 50: CPT | Mod: ,,, | Performed by: HOSPITALIST

## 2017-12-07 PROCEDURE — 20600001 HC STEP DOWN PRIVATE ROOM

## 2017-12-07 PROCEDURE — 84100 ASSAY OF PHOSPHORUS: CPT

## 2017-12-07 PROCEDURE — 85025 COMPLETE CBC W/AUTO DIFF WBC: CPT

## 2017-12-07 PROCEDURE — 80053 COMPREHEN METABOLIC PANEL: CPT

## 2017-12-07 PROCEDURE — 83735 ASSAY OF MAGNESIUM: CPT

## 2017-12-07 PROCEDURE — 25000003 PHARM REV CODE 250: Performed by: HOSPITALIST

## 2017-12-07 PROCEDURE — 63600175 PHARM REV CODE 636 W HCPCS: Performed by: HOSPITALIST

## 2017-12-07 PROCEDURE — 85610 PROTHROMBIN TIME: CPT

## 2017-12-07 RX ORDER — SODIUM,POTASSIUM PHOSPHATES 280-250MG
2 POWDER IN PACKET (EA) ORAL
Status: COMPLETED | OUTPATIENT
Start: 2017-12-07 | End: 2017-12-08

## 2017-12-07 RX ADMIN — RIFAXIMIN 550 MG: 550 TABLET ORAL at 09:12

## 2017-12-07 RX ADMIN — CEFTRIAXONE SODIUM 1 G: 1 INJECTION, POWDER, FOR SOLUTION INTRAMUSCULAR; INTRAVENOUS at 01:12

## 2017-12-07 RX ADMIN — MIDODRINE HYDROCHLORIDE 5 MG: 5 TABLET ORAL at 06:12

## 2017-12-07 RX ADMIN — POTASSIUM & SODIUM PHOSPHATES POWDER PACK 280-160-250 MG 2 PACKET: 280-160-250 PACK at 11:12

## 2017-12-07 RX ADMIN — MIDODRINE HYDROCHLORIDE 5 MG: 5 TABLET ORAL at 02:12

## 2017-12-07 RX ADMIN — LACTULOSE 20 G: 20 SOLUTION ORAL at 06:12

## 2017-12-07 RX ADMIN — PHYTONADIONE 5 MG: 10 INJECTION, EMULSION INTRAMUSCULAR; INTRAVENOUS; SUBCUTANEOUS at 09:12

## 2017-12-07 RX ADMIN — PANTOPRAZOLE SODIUM 40 MG: 40 TABLET, DELAYED RELEASE ORAL at 08:12

## 2017-12-07 RX ADMIN — POTASSIUM & SODIUM PHOSPHATES POWDER PACK 280-160-250 MG 2 PACKET: 280-160-250 PACK at 09:12

## 2017-12-07 RX ADMIN — POTASSIUM BICARBONATE 50 MEQ: 25 TABLET, EFFERVESCENT ORAL at 02:12

## 2017-12-07 RX ADMIN — POTASSIUM BICARBONATE 50 MEQ: 25 TABLET, EFFERVESCENT ORAL at 09:12

## 2017-12-07 RX ADMIN — PREDNISONE 10 MG: 10 TABLET ORAL at 09:12

## 2017-12-07 RX ADMIN — LACTULOSE 20 G: 20 SOLUTION ORAL at 02:12

## 2017-12-07 RX ADMIN — POTASSIUM & SODIUM PHOSPHATES POWDER PACK 280-160-250 MG 2 PACKET: 280-160-250 PACK at 05:12

## 2017-12-07 RX ADMIN — LACTULOSE 20 G: 20 SOLUTION ORAL at 09:12

## 2017-12-07 RX ADMIN — MIDODRINE HYDROCHLORIDE 5 MG: 5 TABLET ORAL at 09:12

## 2017-12-07 NOTE — PROGRESS NOTES
[facility] Hospital Medicine  Progress Note    Patient Name: Marcie Bazan  MRN: [id number]  Patient Class: IP- Inpatient   Admission Date: 11/23/2017  Length of Stay: 13 days  Attending Physician: Burton Vora MD  Primary Care Provider: Milton Ferrer, [facility] [place] Medicine Team: [facility] HOSP MED A Burton Vora MD    Subjective:     Principal Problem:Decompensated hepatic cirrhosis    HPI:  52 year old female with PMHx of 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 Patient presented to [facility] with generalized weakness. Onset two months ago, progressively worsening. Was admitted to  11/10 for hyponatremia seen on labs in GI office. Discharged on 11/15 and returned on 11/20 with 1 week worsening weakness, malaise, lethargy, and decreased appetite. Denied fever/chills, abdominal pain, bright red or black tarry stools, hematemesis or other abnormal bleeding, confusion or disorientation, changes in urination, light-headedness, headache, changes in vision, dyspnea, chest pain or palpitations. Labs demonstrated Na 116 and MELD 32; patient admitted for hyponatremia. Nephrology consulted, recommended fluid restriction , holding diuretics, and prednisode taper that started 11/23 (decreased from 40mg QD to 20mg Qd.) Patient transferred to [facility] for revaluation of liver disease. Of note, paracentesis performed 11/22, removed 2L, negative for SBP.    Follows with Dr. Souza for cirrhosis. Since early 09/2017, patient's liver failure progressing indicated by worsening clinical signs (abdominal distention, weakness/fatigue) and MELD. In mid-October, patient's PETH demonstrated EtOH use and she was denied candidacy for liver transplant. She was then enrolled in in-patient substance abuse program at [facility] where she has remained since early  November; per patient, her last EtOH beverage was 11/01/2017. Denies history of GI bleed, SBP, renal disease, or other complications of cirrhosis. Liver biopsy 10/24 demonstrated stage 4 cirrhosis. Records reports EGD in August 2016 that showed small esophageal varices with severe gastritis. Colonoscopy 2015 with 2 polyps and internal hemorrhoids Started on prednisone 40mg QD on 10/25; was on spironolactone, has been held since 11/10 for hypoNa; on lactulose.    Hospital Course:  Mental status improving with hepatic encephalopathy treatment. Started empiric ceftriaxone given leukocytosis in immunocompromised state. CMV PCR positive overnight, for which ID was consulted, recommended repeating titers in 1 week. Seen by Psychiatry and  who determined she was moderate risk for Liver transplant. Will proceed with EGD/Cscope and mammogram after FFP. Colonoscopy not performed due to poor prep, EGD revealed large varices which were banded. IR paracentesis ordered after patient started complaining of abdominal pain. Mammogram performed, IR paracentesis after INR Reversal.       Interval History: patient presented today and has been accepted pending review of old and new mammogram by breast radiology; paracentesis done today with 6.2 L removed and negative for infection; patient is AAO times 4 and doing well     Review of Systems   Constitutional: Negative for chills and fever.   HENT: Negative for rhinorrhea and sore throat.    Eyes: Negative for pain and discharge.   Respiratory: Negative for cough and shortness of breath.    Cardiovascular: Negative for chest pain and leg swelling.   Gastrointestinal: Positive for abdominal distention. Negative for abdominal pain, blood in stool, diarrhea, nausea and vomiting.   Endocrine: Negative for cold intolerance and heat intolerance.   Genitourinary: Negative for dysuria and flank pain.   Neurological: Negative for dizziness and numbness.   Psychiatric/Behavioral:  Negative for behavioral problems and confusion.     Objective:     Vital Signs (Most Recent):  Temp: 98.4 °F (36.9 °C) (12/06/17 2103)  Pulse: 79 (12/06/17 2103)  Resp: 20 (12/06/17 2103)  BP: (!) 101/54 (12/06/17 2103)  SpO2: (!) 92 % (12/06/17 2103) Vital Signs (24h Range):  Temp:  [96.8 °F (36 °C)-98.8 °F (37.1 °C)] 98.4 °F (36.9 °C)  Pulse:  [75-85] 79  Resp:  [15-20] 20  SpO2:  [92 %-100 %] 92 %  BP: ()/(28-67) 101/54     Weight: 69.6 kg (153 lb 7 oz)  Body mass index is 25.53 kg/m².    Intake/Output Summary (Last 24 hours) at 12/06/17 2252  Last data filed at 12/06/17 1800   Gross per 24 hour   Intake             1390 ml   Output             6200 ml   Net            -4810 ml      Physical Exam   Constitutional: She appears well-developed and well-nourished.   HENT:   Head: Normocephalic and atraumatic.   Eyes: Conjunctivae are normal. Pupils are equal, round, and reactive to light. Scleral icterus is present.   Neck: Normal range of motion. No thyromegaly present.   Cardiovascular: Normal rate, regular rhythm and normal heart sounds.    Pulmonary/Chest: Effort normal and breath sounds normal.   Abdominal: Soft. She exhibits distension. There is no tenderness.   Musculoskeletal: Normal range of motion. She exhibits no edema.   Neurological: She is alert.   AAO times 2   Skin: No erythema. No pallor.       MELD-Na score: 29 at 12/6/2017  3:19 PM  MELD score: 29 at 12/6/2017  3:19 PM  Calculated from:  Serum Creatinine: 0.8 mg/dL (Rounded to 1) at 12/6/2017  3:19 PM  Serum Sodium: 137 mmol/L at 12/6/2017  3:19 PM  Total Bilirubin: 23.5 mg/dL at 12/6/2017  3:19 PM  INR(ratio): 2.7 at 12/6/2017  5:38 AM  Age: 52 years    Significant Labs:  CBC:    Recent Labs  Lab 12/05/17  0419 12/06/17  0538   WBC 9.21 7.83   HGB 8.1* 7.9*   HCT 22.3* 22.3*   PLT 44* 51*     CMP:    Recent Labs  Lab 12/05/17  1652 12/06/17  0538 12/06/17  1519   * 134* 137   K 3.2* 2.6* 3.3*   CL 94* 96 97   CO2 26 27 30*   GLU 86 83  97   BUN 10 10 9   CREATININE 0.8 0.8 0.8   CALCIUM 9.3 9.1 9.6   PROT 6.1 5.6* 5.6*   ALBUMIN 3.9 3.8 4.0   BILITOT 26.5* 24.0* 23.5*   ALKPHOS 148* 129 114   AST 81* 59* 52*   ALT 79* 65* 55*   ANIONGAP 13 11 10   EGFRNONAA >60.0 >60.0 >60.0     PTINR:    Recent Labs  Lab 12/05/17  0419 12/06/17  0538   INR 2.3* 2.7*       Significant Procedures:   Dobutamine Stress Test with Color Flow:   Results for orders placed or performed during the hospital encounter of 10/19/17   Dobutamine Stress Test w/ Color Flow   Result Value Ref Range    EF 73 55 - 65    Diastolic Dysfunction No     Est. PA Systolic Pressure 21.32     Tricuspid Valve Regurgitation TRIVIAL             Assessment/Plan:      * Decompensated hepatic cirrhosis    MELD-Na score: 29 at 12/6/2017  3:19 PM  MELD score: 29 at 12/6/2017  3:19 PM  Calculated from:  Serum Creatinine: 0.8 mg/dL (Rounded to 1) at 12/6/2017  3:19 PM  Serum Sodium: 137 mmol/L at 12/6/2017  3:19 PM  Total Bilirubin: 23.5 mg/dL at 12/6/2017  3:19 PM  INR(ratio): 2.7 at 12/6/2017  5:38 AM  Age: 52 years   - had half of her evaluation completed on last admission  - AS per Psychiatry patient is moderate risk, will proceed with Liver transplant evaluation  - EGD performed, esophageal varices banded,   - Mammogram performed on 12/4/17   - C-scope repeated on 12/5- good prep  - accepted for liver transplant pending radiology review of old and new mammogram         Transplant donor evaluation    To be presented at Liver selection committee on 12/06/17          Immunosuppressed status    On chronic prednisone for alcoholic hepatitis; currently 15 mg, would go down by 5 weekly, next change would be 10 mg this saturday          Coagulopathy    INR 3.5 will give IV Vitamin K          Alcoholic cirrhosis of liver with ascites    - see decompensated cirrhosis           Cytomegalovirus (CMV) viremia    -- ID evaluated and do not feel it needs to be treated        Thrombocytopenia    -- monitor            Abnormal liver enzymes    -- Stable  -- Etiology liver failure; see that section for more details          Alcoholic hepatitis with ascites- decompensated with elevated MELD    - cont tapering prednisone, currently at 5 mg daily         Hyponatremia    - on 800 cc fluid restriction; on albumin  - 116-->118-->120-->122-->123-->126-->125-->127-->131-->133        End stage liver disease              HCC (hepatocellular carcinoma)    AFP 9; seen on CT on 10/19, 1 cm focus, monitor for now          Hepatic encephalopathy    -- Improving, though not yet returned to baseline, AAO times 3; put out 1 L in flexi-seal  -- Continue lactulose and rifaximin          Severe protein-calorie malnutrition    PAB, boost and dietary consult          Ascites    -- Holding diuretics at present, s/p 2L removed on 11/22 with testing negative for SBP  -- No abdominal pain today              Essential hypertension    - Stable  - Holding home lisinopril in setting of decompensated liver disease          Anemia of chronic disease    - monitor daily            VTE Risk Mitigation         Ordered     Place sequential compression device  Until discontinued      11/24/17 0734     Place FABIOLA hose  Until discontinued      11/23/17 2305     Medium Risk of VTE  Once      11/23/17 2305              Burton Vora MD  Department of Hospital Medicine   [facility]

## 2017-12-07 NOTE — ASSESSMENT & PLAN NOTE
MELD-Na score: 29 at 12/6/2017  3:19 PM  MELD score: 29 at 12/6/2017  3:19 PM  Calculated from:  Serum Creatinine: 0.8 mg/dL (Rounded to 1) at 12/6/2017  3:19 PM  Serum Sodium: 137 mmol/L at 12/6/2017  3:19 PM  Total Bilirubin: 23.5 mg/dL at 12/6/2017  3:19 PM  INR(ratio): 2.7 at 12/6/2017  5:38 AM  Age: 52 years   - had half of her evaluation completed on last admission  - AS per Psychiatry patient is moderate risk, will proceed with Liver transplant evaluation  - EGD performed, esophageal varices banded,   - Mammogram performed on 12/4/17   - C-scope repeated on 12/5- good prep  - accepted for liver transplant pending radiology review of old and new mammogram

## 2017-12-07 NOTE — PLAN OF CARE
Problem: Patient Care Overview  Goal: Plan of Care Review  Outcome: Ongoing (interventions implemented as appropriate)  Pt VSS, Aox4, denies pain, new PIV inserted on 12/6. Pt  Refuses lactulose b/c has 3 BM on 12/6. Pt skid proof socks on and belongings in reach. Will continue to monitor.

## 2017-12-07 NOTE — PLAN OF CARE
Problem: Patient Care Overview  Goal: Plan of Care Review  Outcome: Ongoing (interventions implemented as appropriate)  IV Vitamin K administered. Para done. 6.2L removed. Specimen sent for testing. Diet advanced from clears to Low sodium. Compliant with Lactulose but no BM today, however colonoscopy was just done yesterday. Seen by PT/OT. No acute changes.

## 2017-12-07 NOTE — TELEPHONE ENCOUNTER
KARLA received a call from the dtr Viola # [phone] inquiring if the patient has been listed. KARLA redirected to speak with pt and Viola reported that she has.  KARLA confirmed that the patient has been approved for listing but pending her mammogram. KARLA remains available.

## 2017-12-07 NOTE — SUBJECTIVE & OBJECTIVE
Interval History: patient presented today and has been accepted pending review of old and new mammogram by breast radiology; paracentesis done today with 6.2 L removed and negative for infection; patient is AAO times 4 and doing well     Review of Systems   Constitutional: Negative for chills and fever.   HENT: Negative for rhinorrhea and sore throat.    Eyes: Negative for pain and discharge.   Respiratory: Negative for cough and shortness of breath.    Cardiovascular: Negative for chest pain and leg swelling.   Gastrointestinal: Positive for abdominal distention. Negative for abdominal pain, blood in stool, diarrhea, nausea and vomiting.   Endocrine: Negative for cold intolerance and heat intolerance.   Genitourinary: Negative for dysuria and flank pain.   Neurological: Negative for dizziness and numbness.   Psychiatric/Behavioral: Negative for behavioral problems and confusion.     Objective:     Vital Signs (Most Recent):  Temp: 98.4 °F (36.9 °C) (12/06/17 2103)  Pulse: 79 (12/06/17 2103)  Resp: 20 (12/06/17 2103)  BP: (!) 101/54 (12/06/17 2103)  SpO2: (!) 92 % (12/06/17 2103) Vital Signs (24h Range):  Temp:  [96.8 °F (36 °C)-98.8 °F (37.1 °C)] 98.4 °F (36.9 °C)  Pulse:  [75-85] 79  Resp:  [15-20] 20  SpO2:  [92 %-100 %] 92 %  BP: ()/(28-67) 101/54     Weight: 69.6 kg (153 lb 7 oz)  Body mass index is 25.53 kg/m².    Intake/Output Summary (Last 24 hours) at 12/06/17 2252  Last data filed at 12/06/17 1800   Gross per 24 hour   Intake             1390 ml   Output             6200 ml   Net            -4810 ml      Physical Exam   Constitutional: She appears well-developed and well-nourished.   HENT:   Head: Normocephalic and atraumatic.   Eyes: Conjunctivae are normal. Pupils are equal, round, and reactive to light. Scleral icterus is present.   Neck: Normal range of motion. No thyromegaly present.   Cardiovascular: Normal rate, regular rhythm and normal heart sounds.    Pulmonary/Chest: Effort normal and  breath sounds normal.   Abdominal: Soft. She exhibits distension. There is no tenderness.   Musculoskeletal: Normal range of motion. She exhibits no edema.   Neurological: She is alert.   AAO times 2   Skin: No erythema. No pallor.       MELD-Na score: 29 at 12/6/2017  3:19 PM  MELD score: 29 at 12/6/2017  3:19 PM  Calculated from:  Serum Creatinine: 0.8 mg/dL (Rounded to 1) at 12/6/2017  3:19 PM  Serum Sodium: 137 mmol/L at 12/6/2017  3:19 PM  Total Bilirubin: 23.5 mg/dL at 12/6/2017  3:19 PM  INR(ratio): 2.7 at 12/6/2017  5:38 AM  Age: 52 years    Significant Labs:  CBC:    Recent Labs  Lab 12/05/17  0419 12/06/17  0538   WBC 9.21 7.83   HGB 8.1* 7.9*   HCT 22.3* 22.3*   PLT 44* 51*     CMP:    Recent Labs  Lab 12/05/17  1652 12/06/17  0538 12/06/17  1519   * 134* 137   K 3.2* 2.6* 3.3*   CL 94* 96 97   CO2 26 27 30*   GLU 86 83 97   BUN 10 10 9   CREATININE 0.8 0.8 0.8   CALCIUM 9.3 9.1 9.6   PROT 6.1 5.6* 5.6*   ALBUMIN 3.9 3.8 4.0   BILITOT 26.5* 24.0* 23.5*   ALKPHOS 148* 129 114   AST 81* 59* 52*   ALT 79* 65* 55*   ANIONGAP 13 11 10   EGFRNONAA >60.0 >60.0 >60.0     PTINR:    Recent Labs  Lab 12/05/17  0419 12/06/17  0538   INR 2.3* 2.7*       Significant Procedures:   Dobutamine Stress Test with Color Flow:   Results for orders placed or performed during the hospital encounter of 10/19/17   Dobutamine Stress Test w/ Color Flow   Result Value Ref Range    EF 73 55 - 65    Diastolic Dysfunction No     Est. PA Systolic Pressure 21.32     Tricuspid Valve Regurgitation TRIVIAL

## 2017-12-08 ENCOUNTER — DOCUMENTATION ONLY (OUTPATIENT)
Dept: TRANSPLANT | Facility: CLINIC | Age: 52
End: 2017-12-08

## 2017-12-08 LAB
ALBUMIN SERPL BCP-MCNC: 3.1 G/DL
ALBUMIN SERPL BCP-MCNC: 3.6 G/DL
ALP SERPL-CCNC: 129 U/L
ALP SERPL-CCNC: 164 U/L
ALT SERPL W/O P-5'-P-CCNC: 55 U/L
ALT SERPL W/O P-5'-P-CCNC: 61 U/L
ANION GAP SERPL CALC-SCNC: 7 MMOL/L
ANION GAP SERPL CALC-SCNC: 9 MMOL/L
ANISOCYTOSIS BLD QL SMEAR: SLIGHT
AST SERPL-CCNC: 53 U/L
AST SERPL-CCNC: 62 U/L
BASOPHILS # BLD AUTO: 0.02 K/UL
BASOPHILS NFR BLD: 0.2 %
BILIRUB SERPL-MCNC: 22.5 MG/DL
BILIRUB SERPL-MCNC: 26.8 MG/DL
BUN SERPL-MCNC: 8 MG/DL
BUN SERPL-MCNC: 8 MG/DL
CALCIUM SERPL-MCNC: 8.9 MG/DL
CALCIUM SERPL-MCNC: 9.1 MG/DL
CHLORIDE SERPL-SCNC: 95 MMOL/L
CHLORIDE SERPL-SCNC: 97 MMOL/L
CO2 SERPL-SCNC: 28 MMOL/L
CO2 SERPL-SCNC: 28 MMOL/L
CREAT SERPL-MCNC: 0.7 MG/DL
CREAT SERPL-MCNC: 0.8 MG/DL
DIFFERENTIAL METHOD: ABNORMAL
EOSINOPHIL # BLD AUTO: 0.1 K/UL
EOSINOPHIL NFR BLD: 0.7 %
ERYTHROCYTE [DISTWIDTH] IN BLOOD BY AUTOMATED COUNT: 23.3 %
EST. GFR  (AFRICAN AMERICAN): >60 ML/MIN/1.73 M^2
EST. GFR  (AFRICAN AMERICAN): >60 ML/MIN/1.73 M^2
EST. GFR  (NON AFRICAN AMERICAN): >60 ML/MIN/1.73 M^2
EST. GFR  (NON AFRICAN AMERICAN): >60 ML/MIN/1.73 M^2
GLUCOSE SERPL-MCNC: 107 MG/DL
GLUCOSE SERPL-MCNC: 78 MG/DL
HCT VFR BLD AUTO: 24.4 %
HGB BLD-MCNC: 8.4 G/DL
HYPOCHROMIA BLD QL SMEAR: ABNORMAL
IMM GRANULOCYTES # BLD AUTO: 0.03 K/UL
IMM GRANULOCYTES NFR BLD AUTO: 0.3 %
INR PPP: 3.1
LYMPHOCYTES # BLD AUTO: 4.3 K/UL
LYMPHOCYTES NFR BLD: 50.2 %
MAGNESIUM SERPL-MCNC: 1.5 MG/DL
MCH RBC QN AUTO: 30.3 PG
MCHC RBC AUTO-ENTMCNC: 34.4 G/DL
MCV RBC AUTO: 88 FL
MONOCYTES # BLD AUTO: 0.8 K/UL
MONOCYTES NFR BLD: 9.6 %
NEUTROPHILS # BLD AUTO: 3.4 K/UL
NEUTROPHILS NFR BLD: 39 %
NRBC BLD-RTO: 0 /100 WBC
OVALOCYTES BLD QL SMEAR: ABNORMAL
PHOSPHATE SERPL-MCNC: 2.4 MG/DL
PLATELET # BLD AUTO: 62 K/UL
PLATELET BLD QL SMEAR: ABNORMAL
PMV BLD AUTO: ABNORMAL FL
POIKILOCYTOSIS BLD QL SMEAR: SLIGHT
POLYCHROMASIA BLD QL SMEAR: ABNORMAL
POTASSIUM SERPL-SCNC: 3.3 MMOL/L
POTASSIUM SERPL-SCNC: 3.8 MMOL/L
PROT SERPL-MCNC: 5.1 G/DL
PROT SERPL-MCNC: 6 G/DL
PROTHROMBIN TIME: 31.4 SEC
RBC # BLD AUTO: 2.77 M/UL
SODIUM SERPL-SCNC: 132 MMOL/L
SODIUM SERPL-SCNC: 132 MMOL/L
TARGETS BLD QL SMEAR: ABNORMAL
WBC # BLD AUTO: 8.61 K/UL

## 2017-12-08 PROCEDURE — 36415 COLL VENOUS BLD VENIPUNCTURE: CPT

## 2017-12-08 PROCEDURE — 99231 SBSQ HOSP IP/OBS SF/LOW 25: CPT | Mod: ,,, | Performed by: INTERNAL MEDICINE

## 2017-12-08 PROCEDURE — 25000003 PHARM REV CODE 250: Performed by: STUDENT IN AN ORGANIZED HEALTH CARE EDUCATION/TRAINING PROGRAM

## 2017-12-08 PROCEDURE — 63600175 PHARM REV CODE 636 W HCPCS: Performed by: HOSPITALIST

## 2017-12-08 PROCEDURE — 97116 GAIT TRAINING THERAPY: CPT

## 2017-12-08 PROCEDURE — 85610 PROTHROMBIN TIME: CPT

## 2017-12-08 PROCEDURE — 25000003 PHARM REV CODE 250: Performed by: HOSPITALIST

## 2017-12-08 PROCEDURE — 84100 ASSAY OF PHOSPHORUS: CPT

## 2017-12-08 PROCEDURE — 99233 SBSQ HOSP IP/OBS HIGH 50: CPT | Mod: ,,, | Performed by: HOSPITALIST

## 2017-12-08 PROCEDURE — 80053 COMPREHEN METABOLIC PANEL: CPT | Mod: 91

## 2017-12-08 PROCEDURE — 20600001 HC STEP DOWN PRIVATE ROOM

## 2017-12-08 PROCEDURE — 85025 COMPLETE CBC W/AUTO DIFF WBC: CPT

## 2017-12-08 PROCEDURE — 97530 THERAPEUTIC ACTIVITIES: CPT

## 2017-12-08 PROCEDURE — 83735 ASSAY OF MAGNESIUM: CPT

## 2017-12-08 RX ORDER — SODIUM,POTASSIUM PHOSPHATES 280-250MG
2 POWDER IN PACKET (EA) ORAL
Status: DISPENSED | OUTPATIENT
Start: 2017-12-08 | End: 2017-12-09

## 2017-12-08 RX ADMIN — PREDNISONE 10 MG: 10 TABLET ORAL at 09:12

## 2017-12-08 RX ADMIN — MAGNESIUM SULFATE HEPTAHYDRATE 3 G: 500 INJECTION, SOLUTION INTRAMUSCULAR; INTRAVENOUS at 10:12

## 2017-12-08 RX ADMIN — RIFAXIMIN 550 MG: 550 TABLET ORAL at 09:12

## 2017-12-08 RX ADMIN — LACTULOSE 20 G: 20 SOLUTION ORAL at 06:12

## 2017-12-08 RX ADMIN — PANTOPRAZOLE SODIUM 40 MG: 40 TABLET, DELAYED RELEASE ORAL at 09:12

## 2017-12-08 RX ADMIN — MIDODRINE HYDROCHLORIDE 5 MG: 5 TABLET ORAL at 06:12

## 2017-12-08 RX ADMIN — LACTULOSE 20 G: 20 SOLUTION ORAL at 09:12

## 2017-12-08 RX ADMIN — MIDODRINE HYDROCHLORIDE 5 MG: 5 TABLET ORAL at 09:12

## 2017-12-08 RX ADMIN — POTASSIUM & SODIUM PHOSPHATES POWDER PACK 280-160-250 MG 2 PACKET: 280-160-250 PACK at 06:12

## 2017-12-08 RX ADMIN — MIDODRINE HYDROCHLORIDE 5 MG: 5 TABLET ORAL at 02:12

## 2017-12-08 RX ADMIN — LACTULOSE 20 G: 20 SOLUTION ORAL at 02:12

## 2017-12-08 RX ADMIN — POTASSIUM & SODIUM PHOSPHATES POWDER PACK 280-160-250 MG 2 PACKET: 280-160-250 PACK at 10:12

## 2017-12-08 NOTE — PT/OT/SLP PROGRESS
Physical Therapy Treatment    Patient Name:  Marcie Bazan   MRN:  [id number]    Recommendations:     Discharge Recommendations:  home   Discharge Equipment Recommendations: bedside commode, walker, rolling, shower chair   Barriers to discharge: None    Assessment:     Marcie Bazan is a 52 y.o. female admitted with a medical diagnosis of Decompensated hepatic cirrhosis.  She presents with the following impairments/functional limitations:  impaired endurance, gait instability. Pt with improvement with gait distance, tolerance, and quality in today's session. Not interested in transitioning to [facility] despite limited balance impairment denoted with rollator usage. Appropriate for additional skilled session to assist c/ implementing walking program while in hospital and for discharge in anticipation of pt implementing this until transplant occurs. Frequency decreased and pt appropriate for discharge home c/ no needs.     Rehab Prognosis:  Good; patient would benefit from acute skilled PT services to address these deficits and reach maximum level of function.      Recent Surgery: Procedure(s) (LRB):  COLONOSCOPY (N/A) 3 Days Post-Op    Plan:     During this hospitalization, patient to be seen 2 x/week to address the above listed problems via gait training, therapeutic activities, therapeutic exercises  · Plan of Care Expires:  12/26/17   Plan of Care Reviewed with: patient    Subjective     Communicated with nsg prior to session.  Patient found supine in bed upon PT entry to room, agreeable to treatment.      Chief Complaint: wanting to walk more  Patient comments/goals: create walking program   Pain/Comfort:  · Pain Rating 1: 0/10    Patients cultural, spiritual, Quaker conflicts given the current situation: none stated    Objective:     Patient found with:       General Precautions: Standard, fall   Orthopedic Precautions:N/A   Braces: N/A     Functional Mobility:  · Bed Mobility:     · Supine to Sit: modified  independence  · Sit to Supine: modified independence  · Transfers:     · Sit to Stand:  modified independence with 4 wheeled walker  · Gait: Pt ambulated 300 ft with rollator with SBA; multiple rest breaks performed in standing position due to incr SOB.       AM-PAC 6 CLICK MOBILITY  Turning over in bed (including adjusting bedclothes, sheets and blankets)?: 4  Sitting down on and standing up from a chair with arms (e.g., wheelchair, bedside commode, etc.): 4  Moving from lying on back to sitting on the side of the bed?: 4  Moving to and from a bed to a chair (including a wheelchair)?: 4  Need to walk in hospital room?: 4  Climbing 3-5 steps with a railing?: 3  Total Score: 23       Therapeutic Activities and Exercises:   PT educated pt on new POC of therapy and discharge location. Pt explained additional visit to assist c/ planning walking program while in hospital as well as to discuss further needs to maintain endurance prior to transplant. Pt verbalized agreement.     Patient left supine with all lines intact and call button in reach..    GOALS:    Physical Therapy Goals        Problem: Physical Therapy Goal    Goal Priority Disciplines Outcome Goal Variances Interventions   Physical Therapy Goal     PT/OT, PT Ongoing (interventions implemented as appropriate)     Description:  Goals to be met by: 17    Patient will increase functional independence with mobility by performin. Supine to sit with Set-up [place] MET  2. Sit to supine with Set-up [place] MET  3. Sit to stand transfer with Supervision MET  4. Bed to chair transfer with Supervision using Rolling Walker MET  5. Gait  x 100 feet with Stand-by Assistance using Rolling Walker. MET  Revised: Gait x 300 ft with rollator usage and (S)   6. Lower extremity exercise program x20 reps per handout, with independence                          Time Tracking:     PT Received On: 17  PT Start Time: 1300     PT Stop Time: 1323  PT Total  Time (min): 23 min     Billable Minutes: Gait Training 15 and Therapeutic Activity 8    Treatment Type: Treatment  PT/PTA: PT     PTA Visit Number: 0     Fina Rendon PT, DPT  12/08/2017

## 2017-12-08 NOTE — NURSING
PRE EDUCATION TEACHING NOTE    Marcie Bazan was seen in the hospital today.  Handbook on pre-liver transplant information (see outline below) was given to the patient and time was allowed for questions. Patient's daughter was taught previously but patient was off the floor at the time that education.  Today's session was with patient alone.   Informed consent signed by patient and written information given on selection criteria.      LIVER TRANSPLANT WORK-UP EDUCATION  I. NATIONAL REGISTRY LISTING  A. Information for listing  B. Regions  C. Per UNOS, can be listed at more than one center  II. SURGERY  A. Length  B. Complications: bleeding, infection  C. Central lines, drains, Rivera catheter, incision, endotracheal tube, NG tube  D. Transfusions, cell saver  III. SHORT TERM RECOVERY  A. ICU, PICU, Hospital stay  IV. LONG TERM RECOVERY  A. Labs at home  B. Clinic visits  C. Complications: infection, rejection, readmissions  D. Normal immunity and immunosuppression  E. Incidence of re-admit in 1st year  V. REJECTION  A. Incidence  B. Treatment: Solumedrol, Prograf, Thymoglobulin (actions and side effects)  VI. IMMUNOSUPPRESSIVES  A. Prednisone  B. Imuran/Cellcept  C. Cyclosporin/Prograf  D. Rapamune  E. Need for lifetime compliance  F. Actions and side effects  G. Costs  VII. RECURRENCE OF VIRAL HEPATITIS

## 2017-12-08 NOTE — ASSESSMENT & PLAN NOTE
MELD-Na score: 31 at 12/7/2017  3:46 PM  MELD score: 30 at 12/7/2017  3:46 PM  Calculated from:  Serum Creatinine: 0.8 mg/dL (Rounded to 1) at 12/7/2017  3:46 PM  Serum Sodium: 135 mmol/L at 12/7/2017  3:46 PM  Total Bilirubin: 24.1 mg/dL at 12/7/2017  3:46 PM  INR(ratio): 2.9 at 12/7/2017  4:23 AM  Age: 52 years   - had half of her evaluation completed on last admission  - AS per Psychiatry patient is moderate risk, will proceed with Liver transplant evaluation  - EGD performed, esophageal varices banded,   - Mammogram performed on 12/4/17   - C-scope repeated on 12/5- good prep  - accepted for liver transplant pending radiology review of old and new mammogram

## 2017-12-08 NOTE — PLAN OF CARE
Problem: Patient Care Overview  Goal: Plan of Care Review  Outcome: Ongoing (interventions implemented as appropriate)  Pt Aox4, VSS, POC discussed with pt. Pt denies pain, pt w/o falls, skid proof socks on and will continue to monitor.

## 2017-12-08 NOTE — ASSESSMENT & PLAN NOTE
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  # Ascites  # Hepatic encephalopathy  # Esophageal varices - banded during this admission.  # Anemia - multifactorial  # HCC - 1cm liver mass on tpCT on 10/20/17  # Liver transplant candidacy - Completed workup.   # Addiction risk - was doing inpatient rehab. Seen by addiction psych, thought to have improved insight.     Plan:  Approved for listing pending mammogram results.   Start empirical fluconazole for possible candida and Maalox GI cocktail      General recommendations:  · Daily CBC, CMP and INR for MELD calculation  · Frequent finger stick blood sugar check if active hepatic encepahalopathy  · Avoid narcotics and sedatives  · Low Na diet

## 2017-12-08 NOTE — PROGRESS NOTES
[facility] Hospital Medicine  Progress Note    Patient Name: Marcie Bazan  MRN: [id number]  Patient Class: IP- Inpatient   Admission Date: 11/23/2017  Length of Stay: 14 days  Attending Physician: Burton Vora MD  Primary Care Provider: Milton Ferrer, [facility] Medicine Team: [facility] MED A Burton Vora MD    Subjective:     Principal Problem:Decompensated hepatic cirrhosis    HPI:  52 year old female with PMHx of 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 Patient presented to [facility] with generalized weakness. Onset two months ago, progressively worsening. Was admitted to  11/10 for hyponatremia seen on labs in GI office. Discharged on 11/15 and returned on 11/20 with 1 week worsening weakness, malaise, lethargy, and decreased appetite. Denied fever/chills, abdominal pain, bright red or black tarry stools, hematemesis or other abnormal bleeding, confusion or disorientation, changes in urination, light-headedness, headache, changes in vision, dyspnea, chest pain or palpitations. Labs demonstrated Na 116 and MELD 32; patient admitted for hyponatremia. Nephrology consulted, recommended fluid restriction , holding diuretics, and prednisode taper that started 11/23 (decreased from 40mg QD to 20mg Qd.) Patient transferred to [facility] for revaluation of liver disease. Of note, paracentesis performed 11/22, removed 2L, negative for SBP.    Follows with Dr. Souza for cirrhosis. Since early 09/2017, patient's liver failure progressing indicated by worsening clinical signs (abdominal distention, weakness/fatigue) and MELD. In mid-October, patient's PETH demonstrated EtOH use and she was denied candidacy for liver transplant. She was then enrolled in in-patient substance abuse program at [facility] where she has remained since early  November; per patient, her last EtOH beverage was 11/01/2017. Denies history of GI bleed, SBP, renal disease, or other complications of cirrhosis. Liver biopsy 10/24 demonstrated stage 4 cirrhosis. Records reports EGD in August 2016 that showed small esophageal varices with severe gastritis. Colonoscopy 2015 with 2 polyps and internal hemorrhoids Started on prednisone 40mg QD on 10/25; was on spironolactone, has been held since 11/10 for hypoNa; on lactulose.    Hospital Course:  Mental status improving with hepatic encephalopathy treatment. Started empiric ceftriaxone given leukocytosis in immunocompromised state. CMV PCR positive overnight, for which ID was consulted, recommended repeating titers in 1 week. Seen by Psychiatry and  who determined she was moderate risk for Liver transplant. Will proceed with EGD/Cscope and mammogram after FFP. Colonoscopy not performed due to poor prep, EGD revealed large varices which were banded. IR paracentesis ordered after patient started complaining of abdominal pain. Mammogram performed, IR paracentesis after INR Reversal.       Interval History: patient was elated today when she found out she found out she was approved for liver transplant, still pending breast radiology read to be listed; AAO times 4; possible d/c tomorrow     Review of Systems   Constitutional: Negative for chills and fever.   HENT: Negative for rhinorrhea and sore throat.    Eyes: Negative for pain and discharge.   Respiratory: Negative for cough and shortness of breath.    Cardiovascular: Negative for chest pain and leg swelling.   Gastrointestinal: Positive for abdominal distention. Negative for abdominal pain, blood in stool, diarrhea, nausea and vomiting.   Endocrine: Negative for cold intolerance and heat intolerance.   Genitourinary: Negative for dysuria and flank pain.   Neurological: Negative for dizziness and numbness.   Psychiatric/Behavioral: Negative for behavioral problems and  confusion.     Objective:     Vital Signs (Most Recent):  Temp: 98.7 °F (37.1 °C) (12/07/17 1544)  Pulse: 88 (12/07/17 1544)  Resp: 17 (12/07/17 1544)  BP: (!) 92/52 (12/07/17 1544)  SpO2: 99 % (12/07/17 1544) Vital Signs (24h Range):  Temp:  [98.3 °F (36.8 °C)-98.9 °F (37.2 °C)] 98.7 °F (37.1 °C)  Pulse:  [79-97] 88  Resp:  [16-20] 17  SpO2:  [92 %-100 %] 99 %  BP: ()/(47-67) 92/52     Weight: 69.6 kg (153 lb 7 oz)  Body mass index is 25.53 kg/m².    Intake/Output Summary (Last 24 hours) at 12/07/17 1848  Last data filed at 12/07/17 0157   Gross per 24 hour   Intake               50 ml   Output                0 ml   Net               50 ml      Physical Exam   Constitutional: She appears well-developed and well-nourished.   HENT:   Head: Normocephalic and atraumatic.   Eyes: Conjunctivae are normal. Pupils are equal, round, and reactive to light. Scleral icterus is present.   Neck: Normal range of motion. No thyromegaly present.   Cardiovascular: Normal rate, regular rhythm and normal heart sounds.    Pulmonary/Chest: Effort normal and breath sounds normal.   Abdominal: Soft. She exhibits distension. There is no tenderness.   Musculoskeletal: Normal range of motion. She exhibits no edema.   Neurological: She is alert.   AAO times 2   Skin: No erythema. No pallor.       MELD-Na score: 31 at 12/7/2017  3:46 PM  MELD score: 30 at 12/7/2017  3:46 PM  Calculated from:  Serum Creatinine: 0.8 mg/dL (Rounded to 1) at 12/7/2017  3:46 PM  Serum Sodium: 135 mmol/L at 12/7/2017  3:46 PM  Total Bilirubin: 24.1 mg/dL at 12/7/2017  3:46 PM  INR(ratio): 2.9 at 12/7/2017  4:23 AM  Age: 52 years    Significant Labs:  CBC:    Recent Labs  Lab 12/06/17  0538 12/07/17  0423   WBC 7.83 7.43   HGB 7.9* 7.8*   HCT 22.3* 22.5*   PLT 51* 51*     CMP:    Recent Labs  Lab 12/06/17  1519 12/07/17  0423 12/07/17  1546    137 135*   K 3.3* 3.1* 4.3   CL 97 100 97   CO2 30* 27 30*   GLU 97 92 96   BUN 9 9 8   CREATININE 0.8 0.7 0.8    CALCIUM 9.6 8.9 9.0   PROT 5.6* 5.2* 5.2*   ALBUMIN 4.0 3.6 3.5   BILITOT 23.5* 22.5* 24.1*   ALKPHOS 114 121 129   AST 52* 47* 57*   ALT 55* 48* 53*   ANIONGAP 10 10 8   EGFRNONAA >60.0 >60.0 >60.0     PTINR:    Recent Labs  Lab 12/06/17  0538 12/07/17  0423   INR 2.7* 2.9*       Significant Procedures:   Dobutamine Stress Test with Color Flow:   Results for orders placed or performed during the hospital encounter of 10/19/17   Dobutamine Stress Test w/ Color Flow   Result Value Ref Range    EF 73 55 - 65    Diastolic Dysfunction No     Est. PA Systolic Pressure 21.32     Tricuspid Valve Regurgitation TRIVIAL             Assessment/Plan:      * Decompensated hepatic cirrhosis    MELD-Na score: 31 at 12/7/2017  3:46 PM  MELD score: 30 at 12/7/2017  3:46 PM  Calculated from:  Serum Creatinine: 0.8 mg/dL (Rounded to 1) at 12/7/2017  3:46 PM  Serum Sodium: 135 mmol/L at 12/7/2017  3:46 PM  Total Bilirubin: 24.1 mg/dL at 12/7/2017  3:46 PM  INR(ratio): 2.9 at 12/7/2017  4:23 AM  Age: 52 years   - had half of her evaluation completed on last admission  - AS per Psychiatry patient is moderate risk, will proceed with Liver transplant evaluation  - EGD performed, esophageal varices banded,   - Mammogram performed on 12/4/17   - C-scope repeated on 12/5- good prep  - accepted for liver transplant pending radiology review of old and new mammogram         Transplant donor evaluation    To be presented at Liver selection committee on 12/06/17          Immunosuppressed status    On chronic prednisone for alcoholic hepatitis; currently 15 mg, would go down by 5 weekly, next change would be 10 mg this saturday          Coagulopathy    INR 3.5 will give IV Vitamin K          Alcoholic cirrhosis of liver with ascites    - see decompensated cirrhosis           Cytomegalovirus (CMV) viremia    -- ID evaluated and do not feel it needs to be treated        Thrombocytopenia    -- monitor           Abnormal liver enzymes    --  Stable  -- Etiology liver failure; see that section for more details          Alcoholic hepatitis with ascites- decompensated with elevated MELD    - cont tapering prednisone, currently at 5 mg daily         Hyponatremia    - on 800 cc fluid restriction; on albumin  - 116-->118-->120-->122-->123-->126-->125-->127-->131-->133        End stage liver disease              HCC (hepatocellular carcinoma)    AFP 9; seen on CT on 10/19, 1 cm focus, monitor for now          Hepatic encephalopathy    -- Improving, though not yet returned to baseline, AAO times 3; put out 1 L in flexi-seal  -- Continue lactulose and rifaximin          Severe protein-calorie malnutrition    PAB, boost and dietary consult          Ascites    -- Holding diuretics at present, s/p 2L removed on 11/22 with testing negative for SBP  -- No abdominal pain today              Essential hypertension    - Stable  - Holding home lisinopril in setting of decompensated liver disease          Anemia of chronic disease    - monitor daily            VTE Risk Mitigation         Ordered     Place sequential compression device  Until discontinued      11/24/17 0734     Place FABIOLA hose  Until discontinued      11/23/17 2305     Medium Risk of VTE  Once      11/23/17 2305              Burton Vora MD  Department of Hospital Medicine   [facility]

## 2017-12-08 NOTE — SUBJECTIVE & OBJECTIVE
Interval History:   AOX3  Completed workup. Approved for listing pending clarification of mammogram.   Reports some sore throat.      Current Facility-Administered Medications   Medication    0.9%  NaCl infusion (for blood administration)    lactulose 20 gram/30 mL solution Soln 20 g    magnesium sulfate 3 g in dextrose 5 % 250 mL IVPB    midodrine tablet 5 mg    ondansetron disintegrating tablet 4 mg    ondansetron injection 4 mg    pantoprazole EC tablet 40 mg    potassium, sodium phosphates 280-160-250 mg packet 2 packet    [START ON 12/9/2017] predniSONE tablet 5 mg    rifAXIMin tablet 550 mg    simethicone chewable tablet 80 mg    sodium chloride 0.9% flush 3 mL    sodium chloride 0.9% flush 3 mL       Objective:     Vital Signs (Most Recent):  Temp: 97.4 °F (36.3 °C) (12/08/17 0746)  Pulse: 94 (12/08/17 0746)  Resp: 15 (12/08/17 0746)  BP: 137/63 (12/08/17 0746)  SpO2: 100 % (12/08/17 0746) Vital Signs (24h Range):  Temp:  [97.4 °F (36.3 °C)-99 °F (37.2 °C)] 97.4 °F (36.3 °C)  Pulse:  [86-97] 94  Resp:  [15-18] 15  SpO2:  [97 %-100 %] 100 %  BP: ()/(50-67) 137/63     Weight: 69.6 kg (153 lb 7 oz) (12/05/17 1300)  Body mass index is 25.53 kg/m².    Physical Exam   Constitutional: She is oriented to person, place, and time. She appears ill.   Eyes: Scleral icterus is present.   Cardiovascular: Normal rate.  Exam reveals no friction rub.    Pulmonary/Chest: Effort normal. No respiratory distress.   Abdominal: Soft. Bowel sounds are normal. She exhibits distension. There is no tenderness. There is no rebound and no guarding.   Musculoskeletal: She exhibits edema.   Neurological: She is alert and oriented to person, place, and time.       MELD-Na score: 32 at 12/8/2017  4:51 AM  MELD score: 31 at 12/8/2017  4:51 AM  Calculated from:  Serum Creatinine: 0.7 mg/dL (Rounded to 1) at 12/8/2017  4:51 AM  Serum Sodium: 132 mmol/L at 12/8/2017  4:51 AM  Total Bilirubin: 22.5 mg/dL at 12/8/2017  4:51  AM  INR(ratio): 3.1 at 12/8/2017  4:51 AM  Age: 52 years    Lab Results   Component Value Date    WBC 8.61 12/08/2017    HGB 8.4 (L) 12/08/2017    HCT 24.4 (L) 12/08/2017    MCV 88 12/08/2017    PLT 62 (L) 12/08/2017     Lab Results   Component Value Date    INR 3.1 (H) 12/08/2017     Lab Results   Component Value Date     (L) 12/08/2017    K 3.8 12/08/2017    CREATININE 0.7 12/08/2017     Lab Results   Component Value Date    ALBUMIN 3.1 (L) 12/08/2017    ALT 55 (H) 12/08/2017    AST 53 (H) 12/08/2017     (H) 10/20/2017    ALKPHOS 129 12/08/2017    BILITOT 22.5 (H) 12/08/2017     Lab Results   Component Value Date    AFP 9.0 (H) 11/24/2017       Imaging:  Reviewed and as noted in HPI.

## 2017-12-08 NOTE — SUBJECTIVE & OBJECTIVE
Interval History: patient was elated today when she found out she found out she was approved for liver transplant, still pending breast radiology read to be listed; AAO times 4; possible d/c tomorrow     Review of Systems   Constitutional: Negative for chills and fever.   HENT: Negative for rhinorrhea and sore throat.    Eyes: Negative for pain and discharge.   Respiratory: Negative for cough and shortness of breath.    Cardiovascular: Negative for chest pain and leg swelling.   Gastrointestinal: Positive for abdominal distention. Negative for abdominal pain, blood in stool, diarrhea, nausea and vomiting.   Endocrine: Negative for cold intolerance and heat intolerance.   Genitourinary: Negative for dysuria and flank pain.   Neurological: Negative for dizziness and numbness.   Psychiatric/Behavioral: Negative for behavioral problems and confusion.     Objective:     Vital Signs (Most Recent):  Temp: 98.7 °F (37.1 °C) (12/07/17 1544)  Pulse: 88 (12/07/17 1544)  Resp: 17 (12/07/17 1544)  BP: (!) 92/52 (12/07/17 1544)  SpO2: 99 % (12/07/17 1544) Vital Signs (24h Range):  Temp:  [98.3 °F (36.8 °C)-98.9 °F (37.2 °C)] 98.7 °F (37.1 °C)  Pulse:  [79-97] 88  Resp:  [16-20] 17  SpO2:  [92 %-100 %] 99 %  BP: ()/(47-67) 92/52     Weight: 69.6 kg (153 lb 7 oz)  Body mass index is 25.53 kg/m².    Intake/Output Summary (Last 24 hours) at 12/07/17 1848  Last data filed at 12/07/17 0157   Gross per 24 hour   Intake               50 ml   Output                0 ml   Net               50 ml      Physical Exam   Constitutional: She appears well-developed and well-nourished.   HENT:   Head: Normocephalic and atraumatic.   Eyes: Conjunctivae are normal. Pupils are equal, round, and reactive to light. Scleral icterus is present.   Neck: Normal range of motion. No thyromegaly present.   Cardiovascular: Normal rate, regular rhythm and normal heart sounds.    Pulmonary/Chest: Effort normal and breath sounds normal.   Abdominal: Soft.  She exhibits distension. There is no tenderness.   Musculoskeletal: Normal range of motion. She exhibits no edema.   Neurological: She is alert.   AAO times 2   Skin: No erythema. No pallor.       MELD-Na score: 31 at 12/7/2017  3:46 PM  MELD score: 30 at 12/7/2017  3:46 PM  Calculated from:  Serum Creatinine: 0.8 mg/dL (Rounded to 1) at 12/7/2017  3:46 PM  Serum Sodium: 135 mmol/L at 12/7/2017  3:46 PM  Total Bilirubin: 24.1 mg/dL at 12/7/2017  3:46 PM  INR(ratio): 2.9 at 12/7/2017  4:23 AM  Age: 52 years    Significant Labs:  CBC:    Recent Labs  Lab 12/06/17  0538 12/07/17 0423   WBC 7.83 7.43   HGB 7.9* 7.8*   HCT 22.3* 22.5*   PLT 51* 51*     CMP:    Recent Labs  Lab 12/06/17  1519 12/07/17  0423 12/07/17  1546    137 135*   K 3.3* 3.1* 4.3   CL 97 100 97   CO2 30* 27 30*   GLU 97 92 96   BUN 9 9 8   CREATININE 0.8 0.7 0.8   CALCIUM 9.6 8.9 9.0   PROT 5.6* 5.2* 5.2*   ALBUMIN 4.0 3.6 3.5   BILITOT 23.5* 22.5* 24.1*   ALKPHOS 114 121 129   AST 52* 47* 57*   ALT 55* 48* 53*   ANIONGAP 10 10 8   EGFRNONAA >60.0 >60.0 >60.0     PTINR:    Recent Labs  Lab 12/06/17  0538 12/07/17 0423   INR 2.7* 2.9*       Significant Procedures:   Dobutamine Stress Test with Color Flow:   Results for orders placed or performed during the hospital encounter of 10/19/17   Dobutamine Stress Test w/ Color Flow   Result Value Ref Range    EF 73 55 - 65    Diastolic Dysfunction No     Est. PA Systolic Pressure 21.32     Tricuspid Valve Regurgitation TRIVIAL

## 2017-12-08 NOTE — PLAN OF CARE
Problem: Physical Therapy Goal  Goal: Physical Therapy Goal  Goals to be met by: 17    Patient will increase functional independence with mobility by performin. Supine to sit with Set-up [place] MET  2. Sit to supine with Set-up [place] MET  3. Sit to stand transfer with Supervision MET  4. Bed to chair transfer with Supervision using Rolling Walker MET  5. Gait  x 100 feet with Stand-by Assistance using Rolling Walker. MET  Revised: Gait x 300 ft with rollator usage and (S)   6. Lower extremity exercise program x20 reps per handout, with independence        Outcome: Ongoing (interventions implemented as appropriate)  Multiple goals met and revised on today.  Fina Rendon, PT, DPT  2017

## 2017-12-08 NOTE — PROGRESS NOTES
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 Patient presented to [facility] with generalized weakness and was admitted to  11/10 for hyponatremia seen on labs in GI office. Discharged on 11/15 and returned on 11/20 with 1 week worsening weakness, malaise, lethargy, and decreased appetite. Denied fever/chills, abdominal pain, bright red or black tarry stools, hematemesis or other abnormal bleeding, confusion or disorientation, changes in urination, light-headedness, headache, changes in vision, dyspnea, chest pain or palpitations. Labs demonstrated Na 116 and MELD 32; patient admitted for hyponatremia. Nephrology consulted, recommended fluid restriction , holding diuretics, and prednisode taper that started 11/23 (decreased from 40mg QD to 20mg Qd.) Patient transferred to [facility] for revaluation of liver disease. Of note, paracentesis performed 11/22, removed 2L, negative for SBP.     Follows with Dr. Souza for cirrhosis. Since early 09/2017, patient's liver failure progressing indicated by worsening clinical signs (abdominal distention, weakness/fatigue) and MELD. She had been evaluated by [facility] liver transplant committee 10/25/2017 and declined transplant candidacy 2/2 continued EtOH use and lack of solid caregiver support/plan.She has since been enrolled in in-patient substance abuse program at [facility] where she has remained since early November; per patient, her last EtOH beverage was  "11/01/2017.  Per chart review on 11/22/17:     "Patient's case was discussed in liver selection committee today. Per committee discussion, the SW will call patient's daughter to discuss caregiver plan at this time. If the caregiver plan is adequate, patient will need to follow up with either/both addiction psych or SW regarding substance abuse and plan. May need to do this inpatient if patient is still admitted."        Denies history of GI bleed, SBP, renal disease, or other complications of cirrhosis. Liver biopsy 10/24 demonstrated stage 4 cirrhosis. Records reports EGD in August 2016 that showed small esophageal varices with severe gastritis. Colonoscopy 2015 with 2 polyps and internal hemorrhoids Started on prednisone 40mg QD on 10/25; was on spironolactone, has been held since 11/10 for hypoNa; on lactulose.    Interval History:   AOX3  Completed workup. Approved for listing pending clarification of mammogram.   Reports some sore throat.      Current Facility-Administered Medications   Medication    0.9%  NaCl infusion (for blood administration)    lactulose 20 gram/30 mL solution Soln 20 g    magnesium sulfate 3 g in dextrose 5 % 250 mL IVPB    midodrine tablet 5 mg    ondansetron disintegrating tablet 4 mg    ondansetron injection 4 mg    pantoprazole EC tablet 40 mg    potassium, sodium phosphates 280-160-250 mg packet 2 packet    [START ON 12/9/2017] predniSONE tablet 5 mg    rifAXIMin tablet 550 mg    simethicone chewable tablet 80 mg    sodium chloride 0.9% flush 3 mL    sodium chloride 0.9% flush 3 mL       Objective:     Vital Signs (Most Recent):  Temp: 97.4 °F (36.3 °C) (12/08/17 0746)  Pulse: 94 (12/08/17 0746)  Resp: 15 (12/08/17 0746)  BP: 137/63 (12/08/17 0746)  SpO2: 100 % (12/08/17 0746) Vital Signs (24h Range):  Temp:  [97.4 °F (36.3 °C)-99 °F (37.2 °C)] 97.4 °F (36.3 °C)  Pulse:  [86-97] 94  Resp:  [15-18] 15  SpO2:  [97 %-100 %] 100 %  BP: ()/(50-67) 137/63     Weight: " 69.6 kg (153 lb 7 oz) (12/05/17 1300)  Body mass index is 25.53 kg/m².    Physical Exam   Constitutional: She is oriented to person, place, and time. She appears ill.   Eyes: Scleral icterus is present.   Cardiovascular: Normal rate.  Exam reveals no friction rub.    Pulmonary/Chest: Effort normal. No respiratory distress.   Abdominal: Soft. Bowel sounds are normal. She exhibits distension. There is no tenderness. There is no rebound and no guarding.   Musculoskeletal: She exhibits edema.   Neurological: She is alert and oriented to person, place, and time.       MELD-Na score: 32 at 12/8/2017  4:51 AM  MELD score: 31 at 12/8/2017  4:51 AM  Calculated from:  Serum Creatinine: 0.7 mg/dL (Rounded to 1) at 12/8/2017  4:51 AM  Serum Sodium: 132 mmol/L at 12/8/2017  4:51 AM  Total Bilirubin: 22.5 mg/dL at 12/8/2017  4:51 AM  INR(ratio): 3.1 at 12/8/2017  4:51 AM  Age: 52 years    Lab Results   Component Value Date    WBC 8.61 12/08/2017    HGB 8.4 (L) 12/08/2017    HCT 24.4 (L) 12/08/2017    MCV 88 12/08/2017    PLT 62 (L) 12/08/2017     Lab Results   Component Value Date    INR 3.1 (H) 12/08/2017     Lab Results   Component Value Date     (L) 12/08/2017    K 3.8 12/08/2017    CREATININE 0.7 12/08/2017     Lab Results   Component Value Date    ALBUMIN 3.1 (L) 12/08/2017    ALT 55 (H) 12/08/2017    AST 53 (H) 12/08/2017     (H) 10/20/2017    ALKPHOS 129 12/08/2017    BILITOT 22.5 (H) 12/08/2017     Lab Results   Component Value Date    AFP 9.0 (H) 11/24/2017       Imaging:  Reviewed and as noted in HPI.               Assessment/Plan:     * Decompensated hepatic cirrhosis    # Decompensated Cirrhosis due to alcohol, in remission.  # Ascites  # Hepatic encephalopathy  # Esophageal varices - banded during this admission.  # Anemia - multifactorial  # HCC - 1cm liver mass on tpCT on 10/20/17  # Liver transplant candidacy - Completed workup.   # Addiction risk - was doing inpatient rehab. Seen by addiction psych,  thought to have improved insight.     Plan:  Approved for listing pending mammogram results.   Start empirical fluconazole for possible candida and Maalox GI cocktail      General recommendations:  · Daily CBC, CMP and INR for MELD calculation  · Frequent finger stick blood sugar check if active hepatic encepahalopathy  · Avoid narcotics and sedatives  · Low Na diet              Thank you for your consult. I will follow-up with patient. Please contact us if you have any additional questions.    Ginna Ren MD  Hepatology  [facility]

## 2017-12-08 NOTE — PLAN OF CARE
12/08/17 1134   Discharge Reassessment   Assessment Type Discharge Planning Reassessment   Provided patient/caregiver education on the expected discharge date and the discharge plan Yes   Do you have any problems affording any of your prescribed medications? No   Discharge Plan A (transfer to LTS)   Discharge Plan B Home with family;Home Health   Can the patient answer the patient profile reliably? Yes, cognitively intact   How does the patient rate their overall health at the present time? Fair   Describe the patient's ability to walk at the present time. Walks with the help of equipment   How often would a person be available to care for the patient? Often   Number of comorbid conditions (as recorded on the chart) Five or more   During the past month, has the patient often been bothered by feeling down, depressed or hopeless? No   During the past month, has the patient often been bothered by little interest or pleasure in doing things? No

## 2017-12-09 LAB
ALBUMIN SERPL BCP-MCNC: 3.5 G/DL
ALBUMIN SERPL BCP-MCNC: 3.7 G/DL
ALP SERPL-CCNC: 158 U/L
ALP SERPL-CCNC: 160 U/L
ALT SERPL W/O P-5'-P-CCNC: 58 U/L
ALT SERPL W/O P-5'-P-CCNC: 59 U/L
ANION GAP SERPL CALC-SCNC: 10 MMOL/L
ANION GAP SERPL CALC-SCNC: 11 MMOL/L
AST SERPL-CCNC: 57 U/L
AST SERPL-CCNC: 57 U/L
BACTERIA SPEC AEROBE CULT: NO GROWTH
BASOPHILS # BLD AUTO: 0.05 K/UL
BASOPHILS NFR BLD: 0.5 %
BILIRUB SERPL-MCNC: 26.3 MG/DL
BILIRUB SERPL-MCNC: 26.8 MG/DL
BUN SERPL-MCNC: 10 MG/DL
BUN SERPL-MCNC: 9 MG/DL
CALCIUM SERPL-MCNC: 9.2 MG/DL
CALCIUM SERPL-MCNC: 9.2 MG/DL
CHLORIDE SERPL-SCNC: 95 MMOL/L
CHLORIDE SERPL-SCNC: 96 MMOL/L
CO2 SERPL-SCNC: 25 MMOL/L
CO2 SERPL-SCNC: 27 MMOL/L
CREAT SERPL-MCNC: 0.8 MG/DL
CREAT SERPL-MCNC: 0.9 MG/DL
DIFFERENTIAL METHOD: ABNORMAL
EOSINOPHIL # BLD AUTO: 0.1 K/UL
EOSINOPHIL NFR BLD: 0.9 %
ERYTHROCYTE [DISTWIDTH] IN BLOOD BY AUTOMATED COUNT: 23.1 %
EST. GFR  (AFRICAN AMERICAN): >60 ML/MIN/1.73 M^2
EST. GFR  (AFRICAN AMERICAN): >60 ML/MIN/1.73 M^2
EST. GFR  (NON AFRICAN AMERICAN): >60 ML/MIN/1.73 M^2
EST. GFR  (NON AFRICAN AMERICAN): >60 ML/MIN/1.73 M^2
GLUCOSE SERPL-MCNC: 102 MG/DL
GLUCOSE SERPL-MCNC: 85 MG/DL
HCT VFR BLD AUTO: 26.7 %
HGB BLD-MCNC: 9.1 G/DL
IMM GRANULOCYTES # BLD AUTO: 0.04 K/UL
IMM GRANULOCYTES NFR BLD AUTO: 0.4 %
INR PPP: 3
LYMPHOCYTES # BLD AUTO: 5.3 K/UL
LYMPHOCYTES NFR BLD: 52.2 %
MAGNESIUM SERPL-MCNC: 1.8 MG/DL
MCH RBC QN AUTO: 30.4 PG
MCHC RBC AUTO-ENTMCNC: 34.1 G/DL
MCV RBC AUTO: 89 FL
MONOCYTES # BLD AUTO: 0.9 K/UL
MONOCYTES NFR BLD: 8.7 %
NEUTROPHILS # BLD AUTO: 3.8 K/UL
NEUTROPHILS NFR BLD: 37.3 %
NRBC BLD-RTO: 0 /100 WBC
PHOSPHATE SERPL-MCNC: 3.5 MG/DL
PLATELET # BLD AUTO: 67 K/UL
PMV BLD AUTO: 11 FL
POTASSIUM SERPL-SCNC: 3.2 MMOL/L
POTASSIUM SERPL-SCNC: 3.3 MMOL/L
PROT SERPL-MCNC: 5.8 G/DL
PROT SERPL-MCNC: 6 G/DL
PROTHROMBIN TIME: 30.6 SEC
RBC # BLD AUTO: 2.99 M/UL
SODIUM SERPL-SCNC: 131 MMOL/L
SODIUM SERPL-SCNC: 133 MMOL/L
WBC # BLD AUTO: 10.16 K/UL

## 2017-12-09 PROCEDURE — 83735 ASSAY OF MAGNESIUM: CPT

## 2017-12-09 PROCEDURE — 20600001 HC STEP DOWN PRIVATE ROOM

## 2017-12-09 PROCEDURE — 25000003 PHARM REV CODE 250: Performed by: HOSPITALIST

## 2017-12-09 PROCEDURE — 99233 SBSQ HOSP IP/OBS HIGH 50: CPT | Mod: ,,, | Performed by: HOSPITALIST

## 2017-12-09 PROCEDURE — 25000003 PHARM REV CODE 250: Performed by: STUDENT IN AN ORGANIZED HEALTH CARE EDUCATION/TRAINING PROGRAM

## 2017-12-09 PROCEDURE — 63600175 PHARM REV CODE 636 W HCPCS: Performed by: HOSPITALIST

## 2017-12-09 PROCEDURE — 36415 COLL VENOUS BLD VENIPUNCTURE: CPT

## 2017-12-09 PROCEDURE — 85025 COMPLETE CBC W/AUTO DIFF WBC: CPT

## 2017-12-09 PROCEDURE — 97535 SELF CARE MNGMENT TRAINING: CPT

## 2017-12-09 PROCEDURE — 80053 COMPREHEN METABOLIC PANEL: CPT

## 2017-12-09 PROCEDURE — 84100 ASSAY OF PHOSPHORUS: CPT

## 2017-12-09 PROCEDURE — 85610 PROTHROMBIN TIME: CPT

## 2017-12-09 RX ADMIN — LACTULOSE 20 G: 20 SOLUTION ORAL at 02:12

## 2017-12-09 RX ADMIN — MIDODRINE HYDROCHLORIDE 5 MG: 5 TABLET ORAL at 05:12

## 2017-12-09 RX ADMIN — PANTOPRAZOLE SODIUM 40 MG: 40 TABLET, DELAYED RELEASE ORAL at 09:12

## 2017-12-09 RX ADMIN — RIFAXIMIN 550 MG: 550 TABLET ORAL at 09:12

## 2017-12-09 RX ADMIN — PREDNISONE 5 MG: 5 TABLET ORAL at 09:12

## 2017-12-09 RX ADMIN — MIDODRINE HYDROCHLORIDE 5 MG: 5 TABLET ORAL at 02:12

## 2017-12-09 RX ADMIN — MIDODRINE HYDROCHLORIDE 5 MG: 5 TABLET ORAL at 09:12

## 2017-12-09 RX ADMIN — POTASSIUM BICARBONATE 50 MEQ: 25 TABLET, EFFERVESCENT ORAL at 02:12

## 2017-12-09 RX ADMIN — POTASSIUM & SODIUM PHOSPHATES POWDER PACK 280-160-250 MG 2 PACKET: 280-160-250 PACK at 05:12

## 2017-12-09 RX ADMIN — LACTULOSE 20 G: 20 SOLUTION ORAL at 05:12

## 2017-12-09 NOTE — PLAN OF CARE
Problem: Patient Care Overview  Goal: Plan of Care Review  Outcome: Ongoing (interventions implemented as appropriate)  Daily update      Comments: Pt not being discharged. Pt started on fluconazole for possible Thrush, per doctor's order. Pt labs have gotten worse . Pt mental status intact.

## 2017-12-09 NOTE — PT/OT/SLP PROGRESS
Occupational Therapy   Treatment    Name: Marcie Bazan  MRN: [id number]  Admitting Diagnosis:  Decompensated hepatic cirrhosis  4 Days Post-Op    Recommendations:     Discharge Recommendations: home  Discharge Equipment Recommendations:  shower chair, walker, rolling  Barriers to discharge:  Decreased caregiver support    Subjective     Communicated with: RN prior to session.  Pain/Comfort:  · Pain Rating 1: 0/10    Objective:     Patient found with:      General Precautions: Standard, fall   Orthopedic Precautions:    Braces:       Occupational Performance:    Bed Mobility:    · Patient completed Rolling/Turning to Left with  modified independence  · Patient completed Supine to Sit with modified independence  · Patient completed Sit to Supine with modified independence     Functional Mobility/Transfers:  · Patient completed Sit <> Stand Transfer with modified independence  with  rolling walker   · Patient completed Toilet Transfer Stand Pivot technique with modified independence with  rolling walker    Activities of Daily Living:  · Grooming: modified independence standing at sink  · Toileting: modified independence for clothes management and hygiene   · LBD: Doffed shorts with (I).    Patient left supine with call button in reach    AMPA 6 Click:  AMPA Total Score: 24    Treatment & Education:  Performed toilet t/f and functional mobility with Mod (I).  Discussed D/C of OT services due to pt's high level of (I).   Education:    Assessment:     Marcie Bazan is a 52 y.o. female with a medical diagnosis of Decompensated hepatic cirrhosis.  She has met all of her goals and OT discussed D/C of acute OT services due to high level of functioning. Pt expressed no concerns and agreed with discontinuing OT.  Performance deficits affecting function are impaired endurance.      Rehab Prognosis:  Excellent; patient would benefit from acute skilled OT services to address these deficits and reach maximum level of function.        Plan:     D/C OT services - all goals met.    This Plan of care has been discussed with the patient who was involved in its development and understands and is in agreement with the identified goals and treatment plan    GOALS:    Occupational Therapy Goals        Problem: Occupational Therapy Goal    Goal Priority Disciplines Outcome Interventions   Occupational Therapy Goal     OT, PT/OT Ongoing (interventions implemented as appropriate)    Description:  Goals to be met by: 12/16/17     Patient will increase functional independence with ADLs by performing:    UE Dressing with Set-up Assistance and Supervision. MET 12/9  LE Dressing with Minimal Assistance.    MET 12/6/2017 at [facility]; revised: supervision  Grooming while standing at sink with Contact Guard Assistance. MET 12/9  Toileting from toilet with Stand-by Assistance for hygiene and clothing management. MET 12/9  Supine to sit with Supervision.   MET 12/6/2017  Stand pivot transfers with Contact Guard Assistance.    MET 12/6/2017, Revised: Supervision --- MET 12/9  Bathing while EOB with Setup Assistance and Supervision. DEFERRED  Toilet transfer to toilet with Contact Guard Assistance. MET 12/9                         Time Tracking:     OT Date of Treatment: 12/09/17  OT Start Time: 0842  OT Stop Time: 0857  OT Total Time (min): 15 min    Billable Minutes:Self Care/Home Management 15    JA Caruso  12/9/2017

## 2017-12-09 NOTE — SUBJECTIVE & OBJECTIVE
Interval History: patient's MELD stable today; has had 3 BMs today; AAO times 4 and is getting up and walking around; awaiting mammogram reads, likely Monday; needs to stay until listing     Review of Systems   Constitutional: Negative for chills and fever.   HENT: Negative for rhinorrhea and sore throat.    Eyes: Negative for pain and discharge.   Respiratory: Negative for cough and shortness of breath.    Cardiovascular: Negative for chest pain and leg swelling.   Gastrointestinal: Positive for abdominal distention. Negative for abdominal pain, blood in stool, diarrhea, nausea and vomiting.   Endocrine: Negative for cold intolerance and heat intolerance.   Genitourinary: Negative for dysuria and flank pain.   Neurological: Negative for dizziness and numbness.   Psychiatric/Behavioral: Negative for behavioral problems and confusion.     Objective:     Vital Signs (Most Recent):  Temp: 98.8 °F (37.1 °C) (12/09/17 1527)  Pulse: 92 (12/09/17 1527)  Resp: 16 (12/09/17 0835)  BP: (!) 116/55 (12/09/17 1527)  SpO2: 96 % (12/09/17 1527) Vital Signs (24h Range):  Temp:  [97.6 °F (36.4 °C)-98.8 °F (37.1 °C)] 98.8 °F (37.1 °C)  Pulse:  [79-99] 92  Resp:  [16-18] 16  SpO2:  [96 %-100 %] 96 %  BP: (105-124)/(53-68) 116/55     Weight: 69.6 kg (153 lb 7 oz)  Body mass index is 25.53 kg/m².    Intake/Output Summary (Last 24 hours) at 12/09/17 1543  Last data filed at 12/09/17 0558   Gross per 24 hour   Intake              240 ml   Output                0 ml   Net              240 ml      Physical Exam   Constitutional: She appears well-developed and well-nourished.   HENT:   Head: Normocephalic and atraumatic.   Eyes: Conjunctivae are normal. Pupils are equal, round, and reactive to light. Scleral icterus is present.   Neck: Normal range of motion. No thyromegaly present.   Cardiovascular: Normal rate, regular rhythm and normal heart sounds.    Pulmonary/Chest: Effort normal and breath sounds normal.   Abdominal: Soft. She  exhibits distension. There is no tenderness.   Musculoskeletal: Normal range of motion. She exhibits no edema.   Neurological: She is alert.   AAO times 2   Skin: No erythema. No pallor.       MELD-Na score: 32 at 12/9/2017  5:53 AM  MELD score: 31 at 12/9/2017  5:53 AM  Calculated from:  Serum Creatinine: 0.9 mg/dL (Rounded to 1) at 12/9/2017  5:53 AM  Serum Sodium: 133 mmol/L at 12/9/2017  5:53 AM  Total Bilirubin: 26.3 mg/dL at 12/9/2017  5:53 AM  INR(ratio): 3.0 at 12/9/2017  5:53 AM  Age: 52 years    Significant Labs:  CBC:    Recent Labs  Lab 12/08/17  0451 12/09/17  0553   WBC 8.61 10.16   HGB 8.4* 9.1*   HCT 24.4* 26.7*   PLT 62* 67*     CMP:    Recent Labs  Lab 12/08/17  0451 12/08/17  1552 12/09/17  0553   * 132* 133*   K 3.8 3.3* 3.2*   CL 97 95 95   CO2 28 28 27   GLU 78 107 85   BUN 8 8 9   CREATININE 0.7 0.8 0.9   CALCIUM 8.9 9.1 9.2   PROT 5.1* 6.0 6.0   ALBUMIN 3.1* 3.6 3.7   BILITOT 22.5* 26.8* 26.3*   ALKPHOS 129 164* 160*   AST 53* 62* 57*   ALT 55* 61* 58*   ANIONGAP 7* 9 11   EGFRNONAA >60.0 >60.0 >60.0     PTINR:    Recent Labs  Lab 12/08/17  0451 12/09/17  0553   INR 3.1* 3.0*       Significant Procedures:   Dobutamine Stress Test with Color Flow:   Results for orders placed or performed during the hospital encounter of 10/19/17   Dobutamine Stress Test w/ Color Flow   Result Value Ref Range    EF 73 55 - 65    Diastolic Dysfunction No     Est. PA Systolic Pressure 21.32     Tricuspid Valve Regurgitation TRIVIAL

## 2017-12-09 NOTE — PROGRESS NOTES
[facility] Hospital Medicine  Progress Note    Patient Name: Marcie Bazan  MRN: [id number]  Patient Class: IP- Inpatient   Admission Date: 11/23/2017  Length of Stay: 16 days  Attending Physician: Burton Vora MD  Primary Care Provider: Milton Ferrer, [facility] Medicine Team: [facility]OS[facility] MED A Burton Vora MD    Subjective:     Principal Problem:Decompensated hepatic cirrhosis    HPI:  52 year old female with PMHx of 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 Patient presented to [facility] with generalized weakness. Onset two months ago, progressively worsening. Was admitted to  11/10 for hyponatremia seen on labs in GI office. Discharged on 11/15 and returned on 11/20 with 1 week worsening weakness, malaise, lethargy, and decreased appetite. Denied fever/chills, abdominal pain, bright red or black tarry stools, hematemesis or other abnormal bleeding, confusion or disorientation, changes in urination, light-headedness, headache, changes in vision, dyspnea, chest pain or palpitations. Labs demonstrated Na 116 and MELD 32; patient admitted for hyponatremia. Nephrology consulted, recommended fluid restriction , holding diuretics, and prednisode taper that started 11/23 (decreased from 40mg QD to 20mg Qd.) Patient transferred to [facility] for revaluation of liver disease. Of note, paracentesis performed 11/22, removed 2L, negative for SBP.    Follows with Dr. Souza for cirrhosis. Since early 09/2017, patient's liver failure progressing indicated by worsening clinical signs (abdominal distention, weakness/fatigue) and MELD. In mid-October, patient's PETH demonstrated EtOH use and she was denied candidacy for liver transplant. She was then enrolled in in-patient substance abuse program at [facility] where she has remained since early  November; per patient, her last EtOH beverage was 11/01/2017. Denies history of GI bleed, SBP, renal disease, or other complications of cirrhosis. Liver biopsy 10/24 demonstrated stage 4 cirrhosis. Records reports EGD in August 2016 that showed small esophageal varices with severe gastritis. Colonoscopy 2015 with 2 polyps and internal hemorrhoids Started on prednisone 40mg QD on 10/25; was on spironolactone, has been held since 11/10 for hypoNa; on lactulose.    Hospital Course:  Mental status improving with hepatic encephalopathy treatment. Started empiric ceftriaxone given leukocytosis in immunocompromised state. CMV PCR positive overnight, for which ID was consulted, recommended repeating titers in 1 week. Seen by Psychiatry and  who determined she was moderate risk for Liver transplant. Will proceed with EGD/Cscope and mammogram after FFP. Colonoscopy not performed due to poor prep, EGD revealed large varices which were banded. IR paracentesis ordered after patient started complaining of abdominal pain. Mammogram performed, IR paracentesis after INR Reversal.       Interval History: patient's MELD stable today; has had 3 BMs today; AAO times 4 and is getting up and walking around; awaiting mammogram reads, likely Monday; needs to stay until listing     Review of Systems   Constitutional: Negative for chills and fever.   HENT: Negative for rhinorrhea and sore throat.    Eyes: Negative for pain and discharge.   Respiratory: Negative for cough and shortness of breath.    Cardiovascular: Negative for chest pain and leg swelling.   Gastrointestinal: Positive for abdominal distention. Negative for abdominal pain, blood in stool, diarrhea, nausea and vomiting.   Endocrine: Negative for cold intolerance and heat intolerance.   Genitourinary: Negative for dysuria and flank pain.   Neurological: Negative for dizziness and numbness.   Psychiatric/Behavioral: Negative for behavioral problems and confusion.      Objective:     Vital Signs (Most Recent):  Temp: 98.8 °F (37.1 °C) (12/09/17 1527)  Pulse: 92 (12/09/17 1527)  Resp: 16 (12/09/17 0835)  BP: (!) 116/55 (12/09/17 1527)  SpO2: 96 % (12/09/17 1527) Vital Signs (24h Range):  Temp:  [97.6 °F (36.4 °C)-98.8 °F (37.1 °C)] 98.8 °F (37.1 °C)  Pulse:  [79-99] 92  Resp:  [16-18] 16  SpO2:  [96 %-100 %] 96 %  BP: (105-124)/(53-68) 116/55     Weight: 69.6 kg (153 lb 7 oz)  Body mass index is 25.53 kg/m².    Intake/Output Summary (Last 24 hours) at 12/09/17 1543  Last data filed at 12/09/17 0558   Gross per 24 hour   Intake              240 ml   Output                0 ml   Net              240 ml      Physical Exam   Constitutional: She appears well-developed and well-nourished.   HENT:   Head: Normocephalic and atraumatic.   Eyes: Conjunctivae are normal. Pupils are equal, round, and reactive to light. Scleral icterus is present.   Neck: Normal range of motion. No thyromegaly present.   Cardiovascular: Normal rate, regular rhythm and normal heart sounds.    Pulmonary/Chest: Effort normal and breath sounds normal.   Abdominal: Soft. She exhibits distension. There is no tenderness.   Musculoskeletal: Normal range of motion. She exhibits no edema.   Neurological: She is alert.   AAO times 2   Skin: No erythema. No pallor.       MELD-Na score: 32 at 12/9/2017  5:53 AM  MELD score: 31 at 12/9/2017  5:53 AM  Calculated from:  Serum Creatinine: 0.9 mg/dL (Rounded to 1) at 12/9/2017  5:53 AM  Serum Sodium: 133 mmol/L at 12/9/2017  5:53 AM  Total Bilirubin: 26.3 mg/dL at 12/9/2017  5:53 AM  INR(ratio): 3.0 at 12/9/2017  5:53 AM  Age: 52 years    Significant Labs:  CBC:    Recent Labs  Lab 12/08/17  0451 12/09/17  0553   WBC 8.61 10.16   HGB 8.4* 9.1*   HCT 24.4* 26.7*   PLT 62* 67*     CMP:    Recent Labs  Lab 12/08/17  0451 12/08/17  1552 12/09/17  0553   * 132* 133*   K 3.8 3.3* 3.2*   CL 97 95 95   CO2 28 28 27   GLU 78 107 85   BUN 8 8 9   CREATININE 0.7 0.8 0.9   CALCIUM  8.9 9.1 9.2   PROT 5.1* 6.0 6.0   ALBUMIN 3.1* 3.6 3.7   BILITOT 22.5* 26.8* 26.3*   ALKPHOS 129 164* 160*   AST 53* 62* 57*   ALT 55* 61* 58*   ANIONGAP 7* 9 11   EGFRNONAA >60.0 >60.0 >60.0     PTINR:    Recent Labs  Lab 12/08/17  0451 12/09/17  0553   INR 3.1* 3.0*       Significant Procedures:   Dobutamine Stress Test with Color Flow:   Results for orders placed or performed during the hospital encounter of 10/19/17   Dobutamine Stress Test w/ Color Flow   Result Value Ref Range    EF 73 55 - 65    Diastolic Dysfunction No     Est. PA Systolic Pressure 21.32     Tricuspid Valve Regurgitation TRIVIAL             Assessment/Plan:      * Decompensated hepatic cirrhosis    MELD-Na score: 32 at 12/9/2017  5:53 AM  MELD score: 31 at 12/9/2017  5:53 AM  Calculated from:  Serum Creatinine: 0.9 mg/dL (Rounded to 1) at 12/9/2017  5:53 AM  Serum Sodium: 133 mmol/L at 12/9/2017  5:53 AM  Total Bilirubin: 26.3 mg/dL at 12/9/2017  5:53 AM  INR(ratio): 3.0 at 12/9/2017  5:53 AM  Age: 52 years   - had half of her evaluation completed on last admission  - AS per Psychiatry patient is moderate risk, will proceed with Liver transplant evaluation  - EGD performed, esophageal varices banded,   - Mammogram performed on 12/4/17   - C-scope repeated on 12/5- good prep  - accepted for liver transplant pending radiology review of old and new mammogram         Transplant donor evaluation    To be presented at Liver selection committee on 12/06/17          Immunosuppressed status    On chronic prednisone for alcoholic hepatitis; currently 15 mg, would go down by 5 weekly, next change would be 10 mg this saturday          Coagulopathy    INR 3.5 will give IV Vitamin K          Alcoholic cirrhosis of liver with ascites    - see decompensated cirrhosis           Cytomegalovirus (CMV) viremia    -- ID evaluated and do not feel it needs to be treated        Thrombocytopenia    -- monitor           Abnormal liver enzymes    -- Stable  --  Etiology liver failure; see that section for more details          Alcoholic hepatitis with ascites- decompensated with elevated MELD    - cont tapering prednisone, currently at 5 mg daily         Hyponatremia    - on 800 cc fluid restriction; on albumin  - 116-->118-->120-->122-->123-->126-->125-->127-->131-->133        End stage liver disease              HCC (hepatocellular carcinoma)    AFP 9; seen on CT on 10/19, 1 cm focus, monitor for now          Hepatic encephalopathy    -- Improving, though not yet returned to baseline, AAO times 3; put out 1 L in flexi-seal  -- Continue lactulose and rifaximin          Severe protein-calorie malnutrition    PAB, boost and dietary consult          Ascites    -- Holding diuretics at present, s/p 2L removed on 11/22 with testing negative for SBP  -- No abdominal pain today              Essential hypertension    - Stable  - Holding home lisinopril in setting of decompensated liver disease          Anemia of chronic disease    - monitor daily            VTE Risk Mitigation         Ordered     Place sequential compression device  Until discontinued      11/24/17 0734     Place FABIOLA hose  Until discontinued      11/23/17 2305     Medium Risk of VTE  Once      11/23/17 2305              Burton Vora MD  Department of Hospital Medicine   [facility]

## 2017-12-09 NOTE — PROGRESS NOTES
[facility] Hospital Medicine  Progress Note    Patient Name: Marcie Bazan  MRN: [id number]  Patient Class: IP- Inpatient   Admission Date: 11/23/2017  Length of Stay: 16 days  Attending Physician: Burton Vora MD  Primary Care Provider: Milton Ferrer, [facility] Medicine Team: [facility] HOSP MED A Burton Vora MD    Subjective:     Principal Problem:Decompensated hepatic cirrhosis    HPI:  52 year old female with PMHx of 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 Patient presented to [facility] with generalized weakness. Onset two months ago, progressively worsening. Was admitted to  11/10 for hyponatremia seen on labs in GI office. Discharged on 11/15 and returned on 11/20 with 1 week worsening weakness, malaise, lethargy, and decreased appetite. Denied fever/chills, abdominal pain, bright red or black tarry stools, hematemesis or other abnormal bleeding, confusion or disorientation, changes in urination, light-headedness, headache, changes in vision, dyspnea, chest pain or palpitations. Labs demonstrated Na 116 and MELD 32; patient admitted for hyponatremia. Nephrology consulted, recommended fluid restriction , holding diuretics, and prednisode taper that started 11/23 (decreased from 40mg QD to 20mg Qd.) Patient transferred to [facility] for revaluation of liver disease. Of note, paracentesis performed 11/22, removed 2L, negative for SBP.    Follows with Dr. Souza for cirrhosis. Since early 09/2017, patient's liver failure progressing indicated by worsening clinical signs (abdominal distention, weakness/fatigue) and MELD. In mid-October, patient's PETH demonstrated EtOH use and she was denied candidacy for liver transplant. She was then enrolled in in-patient substance abuse program at [facility] where she has remained since early  November; per patient, her last EtOH beverage was 11/01/2017. Denies history of GI bleed, SBP, renal disease, or other complications of cirrhosis. Liver biopsy 10/24 demonstrated stage 4 cirrhosis. Records reports EGD in August 2016 that showed small esophageal varices with severe gastritis. Colonoscopy 2015 with 2 polyps and internal hemorrhoids Started on prednisone 40mg QD on 10/25; was on spironolactone, has been held since 11/10 for hypoNa; on lactulose.    Hospital Course:  Mental status improving with hepatic encephalopathy treatment. Started empiric ceftriaxone given leukocytosis in immunocompromised state. CMV PCR positive overnight, for which ID was consulted, recommended repeating titers in 1 week. Seen by Psychiatry and  who determined she was moderate risk for Liver transplant. Will proceed with EGD/Cscope and mammogram after FFP. Colonoscopy not performed due to poor prep, EGD revealed large varices which were banded. IR paracentesis ordered after patient started complaining of abdominal pain. Mammogram performed, IR paracentesis after INR Reversal.       Interval History: patient's MELD alo a little today, plan on keeping patient until listing; still awaiting radiology reads of mammograms    Review of Systems   Constitutional: Negative for chills and fever.   HENT: Negative for rhinorrhea and sore throat.    Eyes: Negative for pain and discharge.   Respiratory: Negative for cough and shortness of breath.    Cardiovascular: Negative for chest pain and leg swelling.   Gastrointestinal: Positive for abdominal distention. Negative for abdominal pain, blood in stool, diarrhea, nausea and vomiting.   Endocrine: Negative for cold intolerance and heat intolerance.   Genitourinary: Negative for dysuria and flank pain.   Neurological: Negative for dizziness and numbness.   Psychiatric/Behavioral: Negative for behavioral problems and confusion.     Objective:     Vital Signs (Most Recent):  Temp:  98.8 °F (37.1 °C) (12/09/17 1527)  Pulse: 92 (12/09/17 1527)  Resp: 16 (12/09/17 0835)  BP: (!) 116/55 (12/09/17 1527)  SpO2: 96 % (12/09/17 1527) Vital Signs (24h Range):  Temp:  [97.6 °F (36.4 °C)-98.8 °F (37.1 °C)] 98.8 °F (37.1 °C)  Pulse:  [79-99] 92  Resp:  [16-18] 16  SpO2:  [96 %-100 %] 96 %  BP: (105-124)/(53-68) 116/55     Weight: 69.6 kg (153 lb 7 oz)  Body mass index is 25.53 kg/m².    Intake/Output Summary (Last 24 hours) at 12/09/17 1542  Last data filed at 12/09/17 0558   Gross per 24 hour   Intake              240 ml   Output                0 ml   Net              240 ml      Physical Exam   Constitutional: She appears well-developed and well-nourished.   HENT:   Head: Normocephalic and atraumatic.   Eyes: Conjunctivae are normal. Pupils are equal, round, and reactive to light. Scleral icterus is present.   Neck: Normal range of motion. No thyromegaly present.   Cardiovascular: Normal rate, regular rhythm and normal heart sounds.    Pulmonary/Chest: Effort normal and breath sounds normal.   Abdominal: Soft. She exhibits distension. There is no tenderness.   Musculoskeletal: Normal range of motion. She exhibits no edema.   Neurological: She is alert.   AAO times 2   Skin: No erythema. No pallor.       MELD-Na score: 32 at 12/9/2017  5:53 AM  MELD score: 31 at 12/9/2017  5:53 AM  Calculated from:  Serum Creatinine: 0.9 mg/dL (Rounded to 1) at 12/9/2017  5:53 AM  Serum Sodium: 133 mmol/L at 12/9/2017  5:53 AM  Total Bilirubin: 26.3 mg/dL at 12/9/2017  5:53 AM  INR(ratio): 3.0 at 12/9/2017  5:53 AM  Age: 52 years    Significant Labs:  CBC:    Recent Labs  Lab 12/08/17  0451 12/09/17  0553   WBC 8.61 10.16   HGB 8.4* 9.1*   HCT 24.4* 26.7*   PLT 62* 67*     CMP:    Recent Labs  Lab 12/08/17  0451 12/08/17  1552 12/09/17  0553   * 132* 133*   K 3.8 3.3* 3.2*   CL 97 95 95   CO2 28 28 27   GLU 78 107 85   BUN 8 8 9   CREATININE 0.7 0.8 0.9   CALCIUM 8.9 9.1 9.2   PROT 5.1* 6.0 6.0   ALBUMIN 3.1* 3.6  3.7   BILITOT 22.5* 26.8* 26.3*   ALKPHOS 129 164* 160*   AST 53* 62* 57*   ALT 55* 61* 58*   ANIONGAP 7* 9 11   EGFRNONAA >60.0 >60.0 >60.0     PTINR:    Recent Labs  Lab 12/08/17  0451 12/09/17  0553   INR 3.1* 3.0*       Significant Procedures:   Dobutamine Stress Test with Color Flow:   Results for orders placed or performed during the hospital encounter of 10/19/17   Dobutamine Stress Test w/ Color Flow   Result Value Ref Range    EF 73 55 - 65    Diastolic Dysfunction No     Est. PA Systolic Pressure 21.32     Tricuspid Valve Regurgitation TRIVIAL             Assessment/Plan:      * Decompensated hepatic cirrhosis    MELD-Na score: 32 at 12/9/2017  5:53 AM  MELD score: 31 at 12/9/2017  5:53 AM  Calculated from:  Serum Creatinine: 0.9 mg/dL (Rounded to 1) at 12/9/2017  5:53 AM  Serum Sodium: 133 mmol/L at 12/9/2017  5:53 AM  Total Bilirubin: 26.3 mg/dL at 12/9/2017  5:53 AM  INR(ratio): 3.0 at 12/9/2017  5:53 AM  Age: 52 years   - had half of her evaluation completed on last admission  - AS per Psychiatry patient is moderate risk, will proceed with Liver transplant evaluation  - EGD performed, esophageal varices banded,   - Mammogram performed on 12/4/17   - C-scope repeated on 12/5- good prep  - accepted for liver transplant pending radiology review of old and new mammogram         Transplant donor evaluation    To be presented at Liver selection committee on 12/06/17          Immunosuppressed status    On chronic prednisone for alcoholic hepatitis; currently 15 mg, would go down by 5 weekly, next change would be 10 mg this saturday          Coagulopathy    INR 3.5 will give IV Vitamin K          Alcoholic cirrhosis of liver with ascites    - see decompensated cirrhosis           Cytomegalovirus (CMV) viremia    -- ID evaluated and do not feel it needs to be treated        Thrombocytopenia    -- monitor           Abnormal liver enzymes    -- Stable  -- Etiology liver failure; see that section for more  details          Alcoholic hepatitis with ascites- decompensated with elevated MELD    - cont tapering prednisone, currently at 5 mg daily         Hyponatremia    - on 800 cc fluid restriction; on albumin  - 116-->118-->120-->122-->123-->126-->125-->127-->131-->133        End stage liver disease              HCC (hepatocellular carcinoma)    AFP 9; seen on CT on 10/19, 1 cm focus, monitor for now          Hepatic encephalopathy    -- Improving, though not yet returned to baseline, AAO times 3; put out 1 L in flexi-seal  -- Continue lactulose and rifaximin          Severe protein-calorie malnutrition    PAB, boost and dietary consult          Ascites    -- Holding diuretics at present, s/p 2L removed on 11/22 with testing negative for SBP  -- No abdominal pain today              Essential hypertension    - Stable  - Holding home lisinopril in setting of decompensated liver disease          Anemia of chronic disease    - monitor daily            VTE Risk Mitigation         Ordered     Place sequential compression device  Until discontinued      11/24/17 0734     Place FABIOLA hose  Until discontinued      11/23/17 2305     Medium Risk of VTE  Once      11/23/17 2305              Burton Vora MD  Department of Hospital Medicine   [facility]

## 2017-12-09 NOTE — PLAN OF CARE
Problem: Pressure Ulcer Risk (Clinton Scale) (Adult,Obstetrics,Pediatric)  Intervention: Maintain Head of Bed Elevation Less Than 30 Degrees as Tolerated  Pt rested well throughout night. No c/o pain noted. Denied any needs throughout night. Assisted pt to bathroom. She had bowel movement. Helped back to bed. Bed locked, call light in reach. Will continue to monitor.

## 2017-12-09 NOTE — NURSING
I assumed care of this p. Pt resting at the time. Vitals stable.. Nad noted, call button in  Reach,bed low and locked.Srs upx2. Mentals status intact.

## 2017-12-09 NOTE — ASSESSMENT & PLAN NOTE
MELD-Na score: 32 at 12/9/2017  5:53 AM  MELD score: 31 at 12/9/2017  5:53 AM  Calculated from:  Serum Creatinine: 0.9 mg/dL (Rounded to 1) at 12/9/2017  5:53 AM  Serum Sodium: 133 mmol/L at 12/9/2017  5:53 AM  Total Bilirubin: 26.3 mg/dL at 12/9/2017  5:53 AM  INR(ratio): 3.0 at 12/9/2017  5:53 AM  Age: 52 years   - had half of her evaluation completed on last admission  - AS per Psychiatry patient is moderate risk, will proceed with Liver transplant evaluation  - EGD performed, esophageal varices banded,   - Mammogram performed on 12/4/17   - C-scope repeated on 12/5- good prep  - accepted for liver transplant pending radiology review of old and new mammogram

## 2017-12-09 NOTE — PLAN OF CARE
Problem: Occupational Therapy Goal  Goal: Occupational Therapy Goal  Goals to be met by: 12/16/17     Patient will increase functional independence with ADLs by performing:    UE Dressing with Set-up Assistance and Supervision. MET 12/9  LE Dressing with Minimal Assistance.    MET 12/6/2017 at [facility]; revised: supervision  Grooming while standing at sink with Contact Guard Assistance. MET 12/9  Toileting from toilet with Stand-by Assistance for hygiene and clothing management. MET 12/9  Supine to sit with Supervision.   MET 12/6/2017  Stand pivot transfers with Contact Guard Assistance.    MET 12/6/2017, Revised: Supervision --- MET 12/9  Bathing while EOB with Setup Assistance and Supervision. DEFERRED  Toilet transfer to toilet with Contact Guard Assistance. MET 12/9       Outcome: Outcome(s) achieved Date Met: 12/09/17  All goals met. OT to D/C services.

## 2017-12-09 NOTE — SUBJECTIVE & OBJECTIVE
Interval History: patient's MELD alo a little today, plan on keeping patient until listing; still awaiting radiology reads of mammograms    Review of Systems   Constitutional: Negative for chills and fever.   HENT: Negative for rhinorrhea and sore throat.    Eyes: Negative for pain and discharge.   Respiratory: Negative for cough and shortness of breath.    Cardiovascular: Negative for chest pain and leg swelling.   Gastrointestinal: Positive for abdominal distention. Negative for abdominal pain, blood in stool, diarrhea, nausea and vomiting.   Endocrine: Negative for cold intolerance and heat intolerance.   Genitourinary: Negative for dysuria and flank pain.   Neurological: Negative for dizziness and numbness.   Psychiatric/Behavioral: Negative for behavioral problems and confusion.     Objective:     Vital Signs (Most Recent):  Temp: 98.8 °F (37.1 °C) (12/09/17 1527)  Pulse: 92 (12/09/17 1527)  Resp: 16 (12/09/17 0835)  BP: (!) 116/55 (12/09/17 1527)  SpO2: 96 % (12/09/17 1527) Vital Signs (24h Range):  Temp:  [97.6 °F (36.4 °C)-98.8 °F (37.1 °C)] 98.8 °F (37.1 °C)  Pulse:  [79-99] 92  Resp:  [16-18] 16  SpO2:  [96 %-100 %] 96 %  BP: (105-124)/(53-68) 116/55     Weight: 69.6 kg (153 lb 7 oz)  Body mass index is 25.53 kg/m².    Intake/Output Summary (Last 24 hours) at 12/09/17 1542  Last data filed at 12/09/17 0558   Gross per 24 hour   Intake              240 ml   Output                0 ml   Net              240 ml      Physical Exam   Constitutional: She appears well-developed and well-nourished.   HENT:   Head: Normocephalic and atraumatic.   Eyes: Conjunctivae are normal. Pupils are equal, round, and reactive to light. Scleral icterus is present.   Neck: Normal range of motion. No thyromegaly present.   Cardiovascular: Normal rate, regular rhythm and normal heart sounds.    Pulmonary/Chest: Effort normal and breath sounds normal.   Abdominal: Soft. She exhibits distension. There is no tenderness.    Musculoskeletal: Normal range of motion. She exhibits no edema.   Neurological: She is alert.   AAO times 2   Skin: No erythema. No pallor.       MELD-Na score: 32 at 12/9/2017  5:53 AM  MELD score: 31 at 12/9/2017  5:53 AM  Calculated from:  Serum Creatinine: 0.9 mg/dL (Rounded to 1) at 12/9/2017  5:53 AM  Serum Sodium: 133 mmol/L at 12/9/2017  5:53 AM  Total Bilirubin: 26.3 mg/dL at 12/9/2017  5:53 AM  INR(ratio): 3.0 at 12/9/2017  5:53 AM  Age: 52 years    Significant Labs:  CBC:    Recent Labs  Lab 12/08/17  0451 12/09/17  0553   WBC 8.61 10.16   HGB 8.4* 9.1*   HCT 24.4* 26.7*   PLT 62* 67*     CMP:    Recent Labs  Lab 12/08/17  0451 12/08/17  1552 12/09/17  0553   * 132* 133*   K 3.8 3.3* 3.2*   CL 97 95 95   CO2 28 28 27   GLU 78 107 85   BUN 8 8 9   CREATININE 0.7 0.8 0.9   CALCIUM 8.9 9.1 9.2   PROT 5.1* 6.0 6.0   ALBUMIN 3.1* 3.6 3.7   BILITOT 22.5* 26.8* 26.3*   ALKPHOS 129 164* 160*   AST 53* 62* 57*   ALT 55* 61* 58*   ANIONGAP 7* 9 11   EGFRNONAA >60.0 >60.0 >60.0     PTINR:    Recent Labs  Lab 12/08/17  0451 12/09/17  0553   INR 3.1* 3.0*       Significant Procedures:   Dobutamine Stress Test with Color Flow:   Results for orders placed or performed during the hospital encounter of 10/19/17   Dobutamine Stress Test w/ Color Flow   Result Value Ref Range    EF 73 55 - 65    Diastolic Dysfunction No     Est. PA Systolic Pressure 21.32     Tricuspid Valve Regurgitation TRIVIAL

## 2017-12-10 LAB
ALBUMIN SERPL BCP-MCNC: 3.2 G/DL
ALBUMIN SERPL BCP-MCNC: 3.3 G/DL
ALP SERPL-CCNC: 151 U/L
ALP SERPL-CCNC: 156 U/L
ALT SERPL W/O P-5'-P-CCNC: 51 U/L
ALT SERPL W/O P-5'-P-CCNC: 53 U/L
ANION GAP SERPL CALC-SCNC: 11 MMOL/L
ANION GAP SERPL CALC-SCNC: 11 MMOL/L
ANISOCYTOSIS BLD QL SMEAR: SLIGHT
AST SERPL-CCNC: 52 U/L
AST SERPL-CCNC: 53 U/L
BASOPHILS # BLD AUTO: 0.05 K/UL
BASOPHILS NFR BLD: 0.5 %
BILIRUB SERPL-MCNC: 24.6 MG/DL
BILIRUB SERPL-MCNC: 26.2 MG/DL
BUN SERPL-MCNC: 10 MG/DL
BUN SERPL-MCNC: 12 MG/DL
CALCIUM SERPL-MCNC: 8.6 MG/DL
CALCIUM SERPL-MCNC: 8.8 MG/DL
CHLORIDE SERPL-SCNC: 93 MMOL/L
CHLORIDE SERPL-SCNC: 94 MMOL/L
CO2 SERPL-SCNC: 26 MMOL/L
CO2 SERPL-SCNC: 26 MMOL/L
CREAT SERPL-MCNC: 0.7 MG/DL
CREAT SERPL-MCNC: 0.7 MG/DL
DIFFERENTIAL METHOD: ABNORMAL
EOSINOPHIL # BLD AUTO: 0.1 K/UL
EOSINOPHIL NFR BLD: 1.2 %
ERYTHROCYTE [DISTWIDTH] IN BLOOD BY AUTOMATED COUNT: 22.7 %
EST. GFR  (AFRICAN AMERICAN): >60 ML/MIN/1.73 M^2
EST. GFR  (AFRICAN AMERICAN): >60 ML/MIN/1.73 M^2
EST. GFR  (NON AFRICAN AMERICAN): >60 ML/MIN/1.73 M^2
EST. GFR  (NON AFRICAN AMERICAN): >60 ML/MIN/1.73 M^2
GLUCOSE SERPL-MCNC: 131 MG/DL
GLUCOSE SERPL-MCNC: 73 MG/DL
HCT VFR BLD AUTO: 24 %
HGB BLD-MCNC: 8.6 G/DL
HYPOCHROMIA BLD QL SMEAR: ABNORMAL
IMM GRANULOCYTES # BLD AUTO: 0.03 K/UL
IMM GRANULOCYTES NFR BLD AUTO: 0.3 %
INR PPP: 3.4
LYMPHOCYTES # BLD AUTO: 4.6 K/UL
LYMPHOCYTES NFR BLD: 47.2 %
MAGNESIUM SERPL-MCNC: 1.4 MG/DL
MCH RBC QN AUTO: 31.2 PG
MCHC RBC AUTO-ENTMCNC: 35.8 G/DL
MCV RBC AUTO: 87 FL
MONOCYTES # BLD AUTO: 1.1 K/UL
MONOCYTES NFR BLD: 11.8 %
NEUTROPHILS # BLD AUTO: 3.7 K/UL
NEUTROPHILS NFR BLD: 39 %
NRBC BLD-RTO: 0 /100 WBC
PHOSPHATE SERPL-MCNC: 2.9 MG/DL
PLATELET # BLD AUTO: 67 K/UL
PLATELET BLD QL SMEAR: ABNORMAL
PMV BLD AUTO: 9.8 FL
POIKILOCYTOSIS BLD QL SMEAR: SLIGHT
POTASSIUM SERPL-SCNC: 3.3 MMOL/L
POTASSIUM SERPL-SCNC: 3.7 MMOL/L
PROT SERPL-MCNC: 5.5 G/DL
PROT SERPL-MCNC: 5.9 G/DL
PROTHROMBIN TIME: 34.7 SEC
RBC # BLD AUTO: 2.76 M/UL
SODIUM SERPL-SCNC: 130 MMOL/L
SODIUM SERPL-SCNC: 131 MMOL/L
TARGETS BLD QL SMEAR: ABNORMAL
WBC # BLD AUTO: 9.63 K/UL

## 2017-12-10 PROCEDURE — 85025 COMPLETE CBC W/AUTO DIFF WBC: CPT

## 2017-12-10 PROCEDURE — 20600001 HC STEP DOWN PRIVATE ROOM

## 2017-12-10 PROCEDURE — 25000003 PHARM REV CODE 250: Performed by: HOSPITALIST

## 2017-12-10 PROCEDURE — 83735 ASSAY OF MAGNESIUM: CPT

## 2017-12-10 PROCEDURE — 63600175 PHARM REV CODE 636 W HCPCS: Performed by: HOSPITALIST

## 2017-12-10 PROCEDURE — 99233 SBSQ HOSP IP/OBS HIGH 50: CPT | Mod: ,,, | Performed by: HOSPITALIST

## 2017-12-10 PROCEDURE — 80053 COMPREHEN METABOLIC PANEL: CPT

## 2017-12-10 PROCEDURE — 25000003 PHARM REV CODE 250: Performed by: STUDENT IN AN ORGANIZED HEALTH CARE EDUCATION/TRAINING PROGRAM

## 2017-12-10 PROCEDURE — 36415 COLL VENOUS BLD VENIPUNCTURE: CPT

## 2017-12-10 PROCEDURE — 84100 ASSAY OF PHOSPHORUS: CPT

## 2017-12-10 PROCEDURE — 25000003 PHARM REV CODE 250: Performed by: PHYSICIAN ASSISTANT

## 2017-12-10 PROCEDURE — 85610 PROTHROMBIN TIME: CPT

## 2017-12-10 RX ADMIN — RIFAXIMIN 550 MG: 550 TABLET ORAL at 09:12

## 2017-12-10 RX ADMIN — LACTULOSE 20 G: 20 SOLUTION ORAL at 01:12

## 2017-12-10 RX ADMIN — ALUMINUM HYDROXIDE, MAGNESIUM HYDROXIDE, AND SIMETHICONE 50 ML: 200; 200; 20 SUSPENSION ORAL at 02:12

## 2017-12-10 RX ADMIN — PREDNISONE 5 MG: 5 TABLET ORAL at 09:12

## 2017-12-10 RX ADMIN — PANTOPRAZOLE SODIUM 40 MG: 40 TABLET, DELAYED RELEASE ORAL at 09:12

## 2017-12-10 RX ADMIN — POTASSIUM BICARBONATE 50 MEQ: 25 TABLET, EFFERVESCENT ORAL at 01:12

## 2017-12-10 RX ADMIN — MIDODRINE HYDROCHLORIDE 5 MG: 5 TABLET ORAL at 01:12

## 2017-12-10 RX ADMIN — MIDODRINE HYDROCHLORIDE 5 MG: 5 TABLET ORAL at 09:12

## 2017-12-10 RX ADMIN — MIDODRINE HYDROCHLORIDE 5 MG: 5 TABLET ORAL at 05:12

## 2017-12-10 RX ADMIN — POTASSIUM BICARBONATE 50 MEQ: 25 TABLET, EFFERVESCENT ORAL at 09:12

## 2017-12-10 RX ADMIN — MAGNESIUM SULFATE HEPTAHYDRATE 3 G: 500 INJECTION, SOLUTION INTRAMUSCULAR; INTRAVENOUS at 01:12

## 2017-12-10 RX ADMIN — LACTULOSE 20 G: 20 SOLUTION ORAL at 05:12

## 2017-12-10 NOTE — PLAN OF CARE
Problem: Patient Care Overview  Goal: Plan of Care Review  Outcome: Ongoing (interventions implemented as appropriate)  Pt VSS,Aox4, free of falls and on Ra. Pt c/o of throat pain during shift and [employer] was ordered to help with pain. Comfort was achieved with solution. Pt c/o of letharg. Pt up with 1 assist and will monitor for changes. 2200 lactulose was held due to 5 Bm by pt.

## 2017-12-11 ENCOUNTER — TELEPHONE (OUTPATIENT)
Dept: TRANSPLANT | Facility: CLINIC | Age: 52
End: 2017-12-11

## 2017-12-11 LAB
ALBUMIN SERPL BCP-MCNC: 3.2 G/DL
ALBUMIN SERPL BCP-MCNC: 3.3 G/DL
ALP SERPL-CCNC: 148 U/L
ALP SERPL-CCNC: 159 U/L
ALT SERPL W/O P-5'-P-CCNC: 51 U/L
ALT SERPL W/O P-5'-P-CCNC: 52 U/L
ANION GAP SERPL CALC-SCNC: 9 MMOL/L
ANION GAP SERPL CALC-SCNC: 9 MMOL/L
AST SERPL-CCNC: 56 U/L
AST SERPL-CCNC: 60 U/L
BASOPHILS # BLD AUTO: 0.04 K/UL
BASOPHILS NFR BLD: 0.4 %
BILIRUB SERPL-MCNC: 26.2 MG/DL
BILIRUB SERPL-MCNC: 29.8 MG/DL
BLD PROD TYP BPU: NORMAL
BLD PROD TYP BPU: NORMAL
BLOOD UNIT EXPIRATION DATE: NORMAL
BLOOD UNIT EXPIRATION DATE: NORMAL
BLOOD UNIT TYPE CODE: 600
BLOOD UNIT TYPE CODE: 6200
BLOOD UNIT TYPE: NORMAL
BLOOD UNIT TYPE: NORMAL
BUN SERPL-MCNC: 11 MG/DL
BUN SERPL-MCNC: 11 MG/DL
CALCIUM SERPL-MCNC: 9 MG/DL
CALCIUM SERPL-MCNC: 9.4 MG/DL
CHLORIDE SERPL-SCNC: 93 MMOL/L
CHLORIDE SERPL-SCNC: 94 MMOL/L
CO2 SERPL-SCNC: 27 MMOL/L
CO2 SERPL-SCNC: 29 MMOL/L
CODING SYSTEM: NORMAL
CODING SYSTEM: NORMAL
CREAT SERPL-MCNC: 0.8 MG/DL
CREAT SERPL-MCNC: 0.8 MG/DL
DIFFERENTIAL METHOD: ABNORMAL
DISPENSE STATUS: NORMAL
DISPENSE STATUS: NORMAL
EOSINOPHIL # BLD AUTO: 0.1 K/UL
EOSINOPHIL NFR BLD: 0.9 %
ERYTHROCYTE [DISTWIDTH] IN BLOOD BY AUTOMATED COUNT: 22.5 %
EST. GFR  (AFRICAN AMERICAN): >60 ML/MIN/1.73 M^2
EST. GFR  (AFRICAN AMERICAN): >60 ML/MIN/1.73 M^2
EST. GFR  (NON AFRICAN AMERICAN): >60 ML/MIN/1.73 M^2
EST. GFR  (NON AFRICAN AMERICAN): >60 ML/MIN/1.73 M^2
FIBRINOGEN PPP-MCNC: 179 MG/DL
GLUCOSE SERPL-MCNC: 117 MG/DL
GLUCOSE SERPL-MCNC: 71 MG/DL
HCT VFR BLD AUTO: 24.7 %
HGB BLD-MCNC: 8.7 G/DL
IMM GRANULOCYTES # BLD AUTO: 0.04 K/UL
IMM GRANULOCYTES NFR BLD AUTO: 0.4 %
INR PPP: 3.1
LYMPHOCYTES # BLD AUTO: 4.5 K/UL
LYMPHOCYTES NFR BLD: 48.3 %
MAGNESIUM SERPL-MCNC: 1.8 MG/DL
MCH RBC QN AUTO: 30.4 PG
MCHC RBC AUTO-ENTMCNC: 35.2 G/DL
MCV RBC AUTO: 86 FL
MONOCYTES # BLD AUTO: 1.1 K/UL
MONOCYTES NFR BLD: 12.2 %
NEUTROPHILS # BLD AUTO: 3.5 K/UL
NEUTROPHILS NFR BLD: 37.8 %
NRBC BLD-RTO: 0 /100 WBC
NUM UNITS TRANS FFP: NORMAL
NUM UNITS TRANS FFP: NORMAL
PHOSPHATE SERPL-MCNC: 2.5 MG/DL
PLATELET # BLD AUTO: 72 K/UL
PMV BLD AUTO: 9.9 FL
POTASSIUM SERPL-SCNC: 3.4 MMOL/L
POTASSIUM SERPL-SCNC: 3.9 MMOL/L
PROT SERPL-MCNC: 5.6 G/DL
PROT SERPL-MCNC: 6.1 G/DL
PROTHROMBIN TIME: 31.6 SEC
RBC # BLD AUTO: 2.86 M/UL
SODIUM SERPL-SCNC: 130 MMOL/L
SODIUM SERPL-SCNC: 131 MMOL/L
WBC # BLD AUTO: 9.32 K/UL

## 2017-12-11 PROCEDURE — 25000003 PHARM REV CODE 250: Performed by: HOSPITALIST

## 2017-12-11 PROCEDURE — 85610 PROTHROMBIN TIME: CPT

## 2017-12-11 PROCEDURE — 80053 COMPREHEN METABOLIC PANEL: CPT

## 2017-12-11 PROCEDURE — 99233 SBSQ HOSP IP/OBS HIGH 50: CPT | Mod: ,,, | Performed by: HOSPITALIST

## 2017-12-11 PROCEDURE — 20600001 HC STEP DOWN PRIVATE ROOM

## 2017-12-11 PROCEDURE — 25000003 PHARM REV CODE 250: Performed by: STUDENT IN AN ORGANIZED HEALTH CARE EDUCATION/TRAINING PROGRAM

## 2017-12-11 PROCEDURE — 83735 ASSAY OF MAGNESIUM: CPT

## 2017-12-11 PROCEDURE — 85384 FIBRINOGEN ACTIVITY: CPT

## 2017-12-11 PROCEDURE — 99232 SBSQ HOSP IP/OBS MODERATE 35: CPT | Mod: ,,, | Performed by: INTERNAL MEDICINE

## 2017-12-11 PROCEDURE — 63600175 PHARM REV CODE 636 W HCPCS: Performed by: HOSPITALIST

## 2017-12-11 PROCEDURE — 84100 ASSAY OF PHOSPHORUS: CPT

## 2017-12-11 PROCEDURE — 85025 COMPLETE CBC W/AUTO DIFF WBC: CPT

## 2017-12-11 PROCEDURE — 36415 COLL VENOUS BLD VENIPUNCTURE: CPT

## 2017-12-11 RX ORDER — SODIUM CHLORIDE 0.9 % (FLUSH) 0.9 %
5 SYRINGE (ML) INJECTION
Status: DISCONTINUED | OUTPATIENT
Start: 2017-12-11 | End: 2017-12-26

## 2017-12-11 RX ORDER — HYDROCODONE BITARTRATE AND ACETAMINOPHEN 500; 5 MG/1; MG/1
TABLET ORAL
Status: DISCONTINUED | OUTPATIENT
Start: 2017-12-11 | End: 2017-12-12

## 2017-12-11 RX ORDER — LACTULOSE 10 G/15ML
30 SOLUTION ORAL 3 TIMES DAILY
Status: DISCONTINUED | OUTPATIENT
Start: 2017-12-11 | End: 2017-12-26

## 2017-12-11 RX ORDER — ZINC SULFATE 50(220)MG
220 CAPSULE ORAL DAILY
Status: DISCONTINUED | OUTPATIENT
Start: 2017-12-11 | End: 2017-12-23

## 2017-12-11 RX ADMIN — MIDODRINE HYDROCHLORIDE 5 MG: 5 TABLET ORAL at 01:12

## 2017-12-11 RX ADMIN — RIFAXIMIN 550 MG: 550 TABLET ORAL at 09:12

## 2017-12-11 RX ADMIN — LACTULOSE 20 G: 20 SOLUTION ORAL at 05:12

## 2017-12-11 RX ADMIN — Medication 220 MG: at 01:12

## 2017-12-11 RX ADMIN — MIDODRINE HYDROCHLORIDE 5 MG: 5 TABLET ORAL at 09:12

## 2017-12-11 RX ADMIN — POTASSIUM BICARBONATE 50 MEQ: 25 TABLET, EFFERVESCENT ORAL at 09:12

## 2017-12-11 RX ADMIN — PANTOPRAZOLE SODIUM 40 MG: 40 TABLET, DELAYED RELEASE ORAL at 09:12

## 2017-12-11 RX ADMIN — PREDNISONE 5 MG: 5 TABLET ORAL at 09:12

## 2017-12-11 RX ADMIN — LACTULOSE 30 G: 20 SOLUTION ORAL at 01:12

## 2017-12-11 RX ADMIN — MIDODRINE HYDROCHLORIDE 5 MG: 5 TABLET ORAL at 05:12

## 2017-12-11 RX ADMIN — LACTULOSE 30 G: 20 SOLUTION ORAL at 09:12

## 2017-12-11 NOTE — NURSING
Order to transfuse 2 units FFP acknowledged. Spoke with MD, FFPs to be transfused during night shift in anticipation for paracentesis tomorrow 12/12. Will inform night RN.

## 2017-12-11 NOTE — TELEPHONE ENCOUNTER
Spoke with Laura regarding films that were requested by Dr. Castañeda to due comparison of recent study (mammogram) with previous studies done at outside facility. Inquiring if those films had arrived.  She stated none at this time. States films requested last Tuesday. Explained urgency of having films due to this being the last thing needed for completion of patient eval in order to list the patient for liver transplant.  Verbalized understanding.  Laura states she is following up.

## 2017-12-11 NOTE — SUBJECTIVE & OBJECTIVE
Interval History: patient is encephalopathic today; had only 2 BMs yesterday, apparently has had 5 today so far, will need a few more; zinc added  - still awaiting mammogram reads to be listed  - planning for IR paracentesis in the AM; will transfuse 2 units of FFP at 130 am    Review of Systems   Constitutional: Negative for chills and fever.   HENT: Negative for rhinorrhea and sore throat.    Eyes: Negative for pain and discharge.   Respiratory: Negative for cough and shortness of breath.    Cardiovascular: Negative for chest pain and leg swelling.   Gastrointestinal: Positive for abdominal distention. Negative for abdominal pain, blood in stool, diarrhea, nausea and vomiting.   Endocrine: Negative for cold intolerance and heat intolerance.   Genitourinary: Negative for dysuria and flank pain.   Neurological: Negative for dizziness and numbness.   Psychiatric/Behavioral: Negative for behavioral problems and confusion.     Objective:     Vital Signs (Most Recent):  Temp: 98.5 °F (36.9 °C) (12/11/17 1605)  Pulse: 104 (12/11/17 1605)  Resp: 18 (12/11/17 1605)  BP: 118/74 (12/11/17 1605)  SpO2: 99 % (12/11/17 1605) Vital Signs (24h Range):  Temp:  [97.7 °F (36.5 °C)-98.5 °F (36.9 °C)] 98.5 °F (36.9 °C)  Pulse:  [] 104  Resp:  [16-18] 18  SpO2:  [95 %-100 %] 99 %  BP: ()/(49-74) 118/74     Weight: 69.6 kg (153 lb 7 oz)  Body mass index is 25.53 kg/m².    Intake/Output Summary (Last 24 hours) at 12/11/17 1718  Last data filed at 12/11/17 1350   Gross per 24 hour   Intake              560 ml   Output                0 ml   Net              560 ml      Physical Exam   Constitutional: She appears well-developed and well-nourished.   HENT:   Head: Normocephalic and atraumatic.   Eyes: Conjunctivae are normal. Pupils are equal, round, and reactive to light. Scleral icterus is present.   Neck: Normal range of motion. No thyromegaly present.   Cardiovascular: Normal rate, regular rhythm and normal heart sounds.     Pulmonary/Chest: Effort normal and breath sounds normal.   Abdominal: Soft. She exhibits distension. There is no tenderness.   Musculoskeletal: Normal range of motion. She exhibits no edema.   Neurological: She is alert.   AAO times 2   Skin: No erythema. No pallor.       MELD-Na score: 34 at 12/11/2017  3:50 PM  MELD score: 32 at 12/11/2017  3:50 PM  Calculated from:  Serum Creatinine: 0.8 mg/dL (Rounded to 1) at 12/11/2017  3:50 PM  Serum Sodium: 130 mmol/L at 12/11/2017  3:50 PM  Total Bilirubin: 29.8 mg/dL at 12/11/2017  3:50 PM  INR(ratio): 3.1 at 12/11/2017  4:43 AM  Age: 52 years    Significant Labs:  CBC:    Recent Labs  Lab 12/10/17  0427 12/11/17  0443   WBC 9.63 9.32   HGB 8.6* 8.7*   HCT 24.0* 24.7*   PLT 67* 72*     CMP:    Recent Labs  Lab 12/10/17  1545 12/11/17  0443 12/11/17  1550   * 131* 130*   K 3.7 3.4* 3.9   CL 93* 93* 94*   CO2 26 29 27   * 71 117*   BUN 12 11 11   CREATININE 0.7 0.8 0.8   CALCIUM 8.8 9.0 9.4   PROT 5.9* 5.6* 6.1   ALBUMIN 3.3* 3.2* 3.3*   BILITOT 26.2* 26.2* 29.8*   ALKPHOS 156* 148* 159*   AST 53* 56* 60*   ALT 53* 51* 52*   ANIONGAP 11 9 9   EGFRNONAA >60.0 >60.0 >60.0     PTINR:    Recent Labs  Lab 12/10/17  0427 12/11/17  0443   INR 3.4* 3.1*       Significant Procedures:   Dobutamine Stress Test with Color Flow:   Results for orders placed or performed during the hospital encounter of 10/19/17   Dobutamine Stress Test w/ Color Flow   Result Value Ref Range    EF 73 55 - 65    Diastolic Dysfunction No     Est. PA Systolic Pressure 21.32     Tricuspid Valve Regurgitation TRIVIAL

## 2017-12-11 NOTE — PROGRESS NOTES
[facility] Hospital Medicine  Progress Note    Patient Name: Marcie Bazan  MRN: [id number]  Patient Class: IP- Inpatient   Admission Date: 11/23/2017  Length of Stay: 18 days  Attending Physician: Burton Vora MD  Primary Care Provider: Milton Ferrer, [facility] Medicine Team: [facility] HOSP MED A Burton Vora MD    Subjective:     Principal Problem:Decompensated hepatic cirrhosis    HPI:  52 year old female with PMHx of 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 Patient presented to [facility] with generalized weakness. Onset two months ago, progressively worsening. Was admitted to  11/10 for hyponatremia seen on labs in GI office. Discharged on 11/15 and returned on 11/20 with 1 week worsening weakness, malaise, lethargy, and decreased appetite. Denied fever/chills, abdominal pain, bright red or black tarry stools, hematemesis or other abnormal bleeding, confusion or disorientation, changes in urination, light-headedness, headache, changes in vision, dyspnea, chest pain or palpitations. Labs demonstrated Na 116 and MELD 32; patient admitted for hyponatremia. Nephrology consulted, recommended fluid restriction , holding diuretics, and prednisode taper that started 11/23 (decreased from 40mg QD to 20mg Qd.) Patient transferred to [facility] for revaluation of liver disease. Of note, paracentesis performed 11/22, removed 2L, negative for SBP.    Follows with Dr. Souza for cirrhosis. Since early 09/2017, patient's liver failure progressing indicated by worsening clinical signs (abdominal distention, weakness/fatigue) and MELD. In mid-October, patient's PETH demonstrated EtOH use and she was denied candidacy for liver transplant. She was then enrolled in in-patient substance abuse program at [facility] where she has remained since early  November; per patient, her last EtOH beverage was 11/01/2017. Denies history of GI bleed, SBP, renal disease, or other complications of cirrhosis. Liver biopsy 10/24 demonstrated stage 4 cirrhosis. Records reports EGD in August 2016 that showed small esophageal varices with severe gastritis. Colonoscopy 2015 with 2 polyps and internal hemorrhoids Started on prednisone 40mg QD on 10/25; was on spironolactone, has been held since 11/10 for hypoNa; on lactulose.    Hospital Course:  Mental status improving with hepatic encephalopathy treatment. Started empiric ceftriaxone given leukocytosis in immunocompromised state. CMV PCR positive overnight, for which ID was consulted, recommended repeating titers in 1 week. Seen by Psychiatry and  who determined she was moderate risk for Liver transplant. Will proceed with EGD/Cscope and mammogram after FFP. Colonoscopy not performed due to poor prep, EGD revealed large varices which were banded. IR paracentesis ordered after patient started complaining of abdominal pain. Mammogram performed, IR paracentesis after INR Reversal.       Interval History: patient's MELD jumped up today 34 today; has had 3 BMs today; AAO times 4 and is getting up and walking around; awaiting mammogram reads, likely Monday; needs to stay until listing     Review of Systems   Constitutional: Negative for chills and fever.   HENT: Negative for rhinorrhea and sore throat.    Eyes: Negative for pain and discharge.   Respiratory: Negative for cough and shortness of breath.    Cardiovascular: Negative for chest pain and leg swelling.   Gastrointestinal: Positive for abdominal distention. Negative for abdominal pain, blood in stool, diarrhea, nausea and vomiting.   Endocrine: Negative for cold intolerance and heat intolerance.   Genitourinary: Negative for dysuria and flank pain.   Neurological: Negative for dizziness and numbness.   Psychiatric/Behavioral: Negative for behavioral problems and  confusion.     Objective:     Vital Signs (Most Recent):  Temp: 98.5 °F (36.9 °C) (12/11/17 1605)  Pulse: 104 (12/11/17 1605)  Resp: 18 (12/11/17 1605)  BP: 118/74 (12/11/17 1605)  SpO2: 99 % (12/11/17 1605) Vital Signs (24h Range):  Temp:  [97.7 °F (36.5 °C)-98.5 °F (36.9 °C)] 98.5 °F (36.9 °C)  Pulse:  [] 104  Resp:  [16-18] 18  SpO2:  [95 %-100 %] 99 %  BP: ()/(49-74) 118/74     Weight: 69.6 kg (153 lb 7 oz)  Body mass index is 25.53 kg/m².    Intake/Output Summary (Last 24 hours) at 12/11/17 1716  Last data filed at 12/11/17 1350   Gross per 24 hour   Intake              560 ml   Output                0 ml   Net              560 ml      Physical Exam   Constitutional: She appears well-developed and well-nourished.   HENT:   Head: Normocephalic and atraumatic.   Eyes: Conjunctivae are normal. Pupils are equal, round, and reactive to light. Scleral icterus is present.   Neck: Normal range of motion. No thyromegaly present.   Cardiovascular: Normal rate, regular rhythm and normal heart sounds.    Pulmonary/Chest: Effort normal and breath sounds normal.   Abdominal: Soft. She exhibits distension. There is no tenderness.   Musculoskeletal: Normal range of motion. She exhibits no edema.   Neurological: She is alert.   AAO times 2   Skin: No erythema. No pallor.       MELD-Na score: 34 at 12/11/2017  3:50 PM  MELD score: 32 at 12/11/2017  3:50 PM  Calculated from:  Serum Creatinine: 0.8 mg/dL (Rounded to 1) at 12/11/2017  3:50 PM  Serum Sodium: 130 mmol/L at 12/11/2017  3:50 PM  Total Bilirubin: 29.8 mg/dL at 12/11/2017  3:50 PM  INR(ratio): 3.1 at 12/11/2017  4:43 AM  Age: 52 years    Significant Labs:  CBC:    Recent Labs  Lab 12/10/17  0427 12/11/17  0443   WBC 9.63 9.32   HGB 8.6* 8.7*   HCT 24.0* 24.7*   PLT 67* 72*     CMP:    Recent Labs  Lab 12/10/17  1545 12/11/17  0443 12/11/17  1550   * 131* 130*   K 3.7 3.4* 3.9   CL 93* 93* 94*   CO2 26 29 27   * 71 117*   BUN 12 11 11   CREATININE  0.7 0.8 0.8   CALCIUM 8.8 9.0 9.4   PROT 5.9* 5.6* 6.1   ALBUMIN 3.3* 3.2* 3.3*   BILITOT 26.2* 26.2* 29.8*   ALKPHOS 156* 148* 159*   AST 53* 56* 60*   ALT 53* 51* 52*   ANIONGAP 11 9 9   EGFRNONAA >60.0 >60.0 >60.0     PTINR:    Recent Labs  Lab 12/10/17  0427 12/11/17  0443   INR 3.4* 3.1*       Significant Procedures:   Dobutamine Stress Test with Color Flow:   Results for orders placed or performed during the hospital encounter of 10/19/17   Dobutamine Stress Test w/ Color Flow   Result Value Ref Range    EF 73 55 - 65    Diastolic Dysfunction No     Est. PA Systolic Pressure 21.32     Tricuspid Valve Regurgitation TRIVIAL             Assessment/Plan:      * Decompensated hepatic cirrhosis    MELD-Na score: 34 at 12/11/2017  3:50 PM  MELD score: 32 at 12/11/2017  3:50 PM  Calculated from:  Serum Creatinine: 0.8 mg/dL (Rounded to 1) at 12/11/2017  3:50 PM  Serum Sodium: 130 mmol/L at 12/11/2017  3:50 PM  Total Bilirubin: 29.8 mg/dL at 12/11/2017  3:50 PM  INR(ratio): 3.1 at 12/11/2017  4:43 AM  Age: 52 years   - had half of her evaluation completed on last admission  - AS per Psychiatry patient is moderate risk, will proceed with Liver transplant evaluation  - EGD performed, esophageal varices banded,   - Mammogram performed on 12/4/17   - C-scope repeated on 12/5- good prep  - accepted for liver transplant pending radiology review of old and new mammogram         Transplant donor evaluation    To be presented at Liver selection committee on 12/06/17          Immunosuppressed status    On chronic prednisone for alcoholic hepatitis; currently 15 mg, would go down by 5 weekly, next change would be 10 mg this saturday          Coagulopathy    INR 3.5 will give IV Vitamin K          Alcoholic cirrhosis of liver with ascites    - see decompensated cirrhosis           Cytomegalovirus (CMV) viremia    -- ID evaluated and do not feel it needs to be treated        Thrombocytopenia    -- monitor           Abnormal liver  enzymes    -- Stable  -- Etiology liver failure; see that section for more details          Alcoholic hepatitis with ascites- decompensated with elevated MELD    - cont tapering prednisone, currently at 5 mg daily         Hyponatremia    - on 800 cc fluid restriction; on albumin  - 116-->118-->120-->122-->123-->126-->125-->127-->131-->133        End stage liver disease              HCC (hepatocellular carcinoma)    AFP 9; seen on CT on 10/19, 1 cm focus, monitor for now          Hepatic encephalopathy    -- Improving, though not yet returned to baseline, AAO times 3; put out 1 L in flexi-seal  -- Continue lactulose and rifaximin          Severe protein-calorie malnutrition    PAB, boost and dietary consult          Ascites    -- Holding diuretics at present, s/p 2L removed on 11/22 with testing negative for SBP  -- No abdominal pain today              Essential hypertension    - Stable  - Holding home lisinopril in setting of decompensated liver disease          Anemia of chronic disease    - monitor daily            VTE Risk Mitigation         Ordered     Place sequential compression device  Until discontinued      11/24/17 0734     Place FABIOLA hose  Until discontinued      11/23/17 2305     Medium Risk of VTE  Once      11/23/17 2305              Burton Vora MD  Department of Hospital Medicine   [facility]

## 2017-12-11 NOTE — ASSESSMENT & PLAN NOTE
MELD-Na score: 34 at 12/11/2017  3:50 PM  MELD score: 32 at 12/11/2017  3:50 PM  Calculated from:  Serum Creatinine: 0.8 mg/dL (Rounded to 1) at 12/11/2017  3:50 PM  Serum Sodium: 130 mmol/L at 12/11/2017  3:50 PM  Total Bilirubin: 29.8 mg/dL at 12/11/2017  3:50 PM  INR(ratio): 3.1 at 12/11/2017  4:43 AM  Age: 52 years   - had half of her evaluation completed on last admission  - AS per Psychiatry patient is moderate risk, will proceed with Liver transplant evaluation  - EGD performed, esophageal varices banded,   - Mammogram performed on 12/4/17   - C-scope repeated on 12/5- good prep  - accepted for liver transplant pending radiology review of old and new mammogram

## 2017-12-11 NOTE — PLAN OF CARE
Problem: Patient Care Overview  Goal: Plan of Care Review  Outcome: Ongoing (interventions implemented as appropriate)  Pt orientation waxing and waning today, delayed responses, confused on date. AVSS on RA, no c/o pain. Up with walker and standby assist. Lactulose administered today, 2 BMs since 7am til now. Bed alarm set, call light within reach, nonskid socks on. Free from falls/injury. [facility].

## 2017-12-11 NOTE — PROGRESS NOTES
[facility]  Hepatology  Progress Note    Patient Name: Marcie Bazan  MRN: [id number]  Admission Date: 11/23/2017  Hospital Length of Stay: 18 days  Attending Provider: Burton Vora MD   Primary Care Physician: ODILIA Wall  Principal Problem:Decompensated hepatic cirrhosis    Subjective:     Transplant status: Pre-transplant    HPI: 52 year old female with PMHx of ESLD 2/2 EtOH hepatitis and essential HTN who presents to [facility] as a direct transfer from [facility] for evaluation of decompensated liver cirrhosis. Patient presented to [facility] with generalized weakness and was admitted to  11/10 for hyponatremia seen on labs in GI office. Discharged on 11/15 and returned on 11/20 with 1 week worsening weakness, malaise, lethargy, and decreased appetite. Denied fever/chills, abdominal pain, bright red or black tarry stools, hematemesis or other abnormal bleeding, confusion or disorientation, changes in urination, light-headedness, headache, changes in vision, dyspnea, chest pain or palpitations. Labs demonstrated Na 116 and MELD 32; patient admitted for hyponatremia. Nephrology consulted, recommended fluid restriction , holding diuretics, and prednisode taper that started 11/23 (decreased from 40mg QD to 20mg Qd.) Patient transferred to [facility] for revaluation of liver disease. Of note, paracentesis performed 11/22, removed 2L, negative for SBP.     Follows with Dr. Souza for cirrhosis. Since early 09/2017, patient's liver failure progressing indicated by worsening clinical signs (abdominal distention, weakness/fatigue) and MELD. She had been [place] [facility] [place] 10/25/2017 and declined transplant candidacy 2/2 continued EtOH use and lack of solid caregiver support/plan.She has since been enrolled in in-patient substance abuse program at [facility] where she has remained since early November; per patient, her last EtOH beverage was  "11/01/2017.  Per chart review on 11/22/17:     "Patient's case was discussed in liver selection committee today. Per committee discussion, the SW will call patient's daughter to discuss caregiver plan at this time. If the caregiver plan is adequate, patient will need to follow up with either/both addiction psych or SW regarding substance abuse and plan. May need to do this inpatient if patient is still admitted."        Denies history of GI bleed, SBP, renal disease, or other complications of cirrhosis. Liver biopsy 10/24 demonstrated stage 4 cirrhosis. Records reports EGD in August 2016 that showed small esophageal varices with severe gastritis. Colonoscopy 2015 with 2 polyps and internal hemorrhoids Started on prednisone 40mg QD on 10/25; was on spironolactone, has been held since 11/10 for hypoNa; on lactulose.    Interval History:     States has poor appetite. Otherwise feels about the same.    Current Facility-Administered Medications   Medication    0.9%  NaCl infusion (for blood administration)    GI cocktail (mylanta 30 mL, lidocaine 2 % viscous 10 mL, dicyclomine 10 mL) 50 mL    lactulose 20 gram/30 mL solution Soln 30 g    midodrine tablet 5 mg    ondansetron disintegrating tablet 4 mg    ondansetron injection 4 mg    pantoprazole EC tablet 40 mg    potassium bicarbonate disintegrating tablet 50 mEq    predniSONE tablet 5 mg    rifAXIMin tablet 550 mg    simethicone chewable tablet 80 mg    sodium chloride 0.9% flush 3 mL    sodium chloride 0.9% flush 3 mL    sodium chloride 0.9% flush 5 mL    zinc sulfate capsule 220 mg       Objective:     Vital Signs (Most Recent):  Temp: 98.5 °F (36.9 °C) (12/11/17 1605)  Pulse: 104 (12/11/17 1605)  Resp: 18 (12/11/17 1605)  BP: 118/74 (12/11/17 1605)  SpO2: 99 % (12/11/17 1605) Vital Signs (24h Range):  Temp:  [97.7 °F (36.5 °C)-98.5 °F (36.9 °C)] 98.5 °F (36.9 °C)  Pulse:  [] 104  Resp:  [16-18] 18  SpO2:  [95 %-100 %] 99 %  BP: ()/(49-74) " 118/74     Weight: 69.6 kg (153 lb 7 oz) (12/05/17 1300)  Body mass index is 25.53 kg/m².    Physical Exam   Constitutional: She is oriented to person, place, and time. She appears ill. No distress.   Eyes: Scleral icterus is present.   Cardiovascular: Normal rate.  Exam reveals no friction rub.    Pulmonary/Chest: Effort normal. No respiratory distress.   Abdominal: Soft. Bowel sounds are normal. She exhibits distension. There is no tenderness. There is no rebound and no guarding.   Musculoskeletal: She exhibits edema.   Neurological: She is alert and oriented to person, place, and time.   Vitals reviewed.      MELD-Na score: 33 at 12/11/2017  4:43 AM  MELD score: 31 at 12/11/2017  4:43 AM  Calculated from:  Serum Creatinine: 0.8 mg/dL (Rounded to 1) at 12/11/2017  4:43 AM  Serum Sodium: 131 mmol/L at 12/11/2017  4:43 AM  Total Bilirubin: 26.2 mg/dL at 12/11/2017  4:43 AM  INR(ratio): 3.1 at 12/11/2017  4:43 AM  Age: 52 years    Significant Labs:  CBC:   Recent Labs  Lab 12/11/17  0443   WBC 9.32   RBC 2.86*   HGB 8.7*   HCT 24.7*   PLT 72*     CMP:   Recent Labs  Lab 12/11/17 0443   GLU 71   CALCIUM 9.0   ALBUMIN 3.2*   PROT 5.6*   *   K 3.4*   CO2 29   CL 93*   BUN 11   CREATININE 0.8   ALKPHOS 148*   ALT 51*   AST 56*   BILITOT 26.2*     Coagulation:   Recent Labs  Lab 12/11/17  0443   INR 3.1*       Significant Imaging:  Labs: Reviewed    Assessment/Plan:     * Decompensated hepatic cirrhosis    # Decompensated Cirrhosis due to alcohol, in remission.  # Ascites  # Hepatic encephalopathy  # Esophageal varices - banded during this admission.  # Anemia - multifactorial  # HCC - 1cm liver mass on tpCT on 10/20/17  # Liver transplant candidacy - Completed workup.   # Addiction risk - was doing inpatient rehab. Seen by addiction psych, thought to have improved insight.     Plan:  Approved for listing pending mammogram results.    - transplant coordinators working to obtain OSH films  Cont empiric  fluconazole  Continue lactulose and rifaximin and zinc  Continue PPI and PRN gi cocktail     General recommendations:  · Daily CBC, CMP and INR for MELD calculation  · Frequent finger stick blood sugar check if active hepatic encepahalopathy  · Avoid narcotics and sedatives  · Low Na diet              Thank you for your consult. I will follow-up with patient. Please contact us if you have any additional questions.    Stalin Powers MD  Hepatology  [facility]

## 2017-12-11 NOTE — ASSESSMENT & PLAN NOTE
-- Holding diuretics at present, s/p 2L removed on 11/22 with testing negative for SBP  -- No abdominal pain today    - planning for IR paracentesis in the AM, 2 units of FFP to be transfused at 130 am

## 2017-12-11 NOTE — ASSESSMENT & PLAN NOTE
-- encephalopathic today; zinc added to lactulose and rifaximin; had 2 BMs yesterday; 5 charted today; will need atleast two more

## 2017-12-11 NOTE — ASSESSMENT & PLAN NOTE
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  # Ascites  # Hepatic encephalopathy  # Esophageal varices - banded during this admission.  # Anemia - multifactorial  # HCC - 1cm liver mass on tpCT on 10/20/17  # Liver transplant candidacy - Completed workup.   # Addiction risk - was doing inpatient rehab. Seen by addiction psych, thought to have improved insight.     Plan:  Approved for listing pending mammogram results.    - transplant coordinators working to obtain OSH films  Cont empiric fluconazole  Continue lactulose and rifaximin and zinc  Continue PPI and PRN gi cocktail     General recommendations:  · Daily CBC, CMP and INR for MELD calculation  · Frequent finger stick blood sugar check if active hepatic encepahalopathy  · Avoid narcotics and sedatives  · Low Na diet

## 2017-12-11 NOTE — SUBJECTIVE & OBJECTIVE
Interval History: patient's MELD jumped up today 34 today; has had 3 BMs today; AAO times 4 and is getting up and walking around; awaiting mammogram reads, likely Monday; needs to stay until listing     Review of Systems   Constitutional: Negative for chills and fever.   HENT: Negative for rhinorrhea and sore throat.    Eyes: Negative for pain and discharge.   Respiratory: Negative for cough and shortness of breath.    Cardiovascular: Negative for chest pain and leg swelling.   Gastrointestinal: Positive for abdominal distention. Negative for abdominal pain, blood in stool, diarrhea, nausea and vomiting.   Endocrine: Negative for cold intolerance and heat intolerance.   Genitourinary: Negative for dysuria and flank pain.   Neurological: Negative for dizziness and numbness.   Psychiatric/Behavioral: Negative for behavioral problems and confusion.     Objective:     Vital Signs (Most Recent):  Temp: 98.5 °F (36.9 °C) (12/11/17 1605)  Pulse: 104 (12/11/17 1605)  Resp: 18 (12/11/17 1605)  BP: 118/74 (12/11/17 1605)  SpO2: 99 % (12/11/17 1605) Vital Signs (24h Range):  Temp:  [97.7 °F (36.5 °C)-98.5 °F (36.9 °C)] 98.5 °F (36.9 °C)  Pulse:  [] 104  Resp:  [16-18] 18  SpO2:  [95 %-100 %] 99 %  BP: ()/(49-74) 118/74     Weight: 69.6 kg (153 lb 7 oz)  Body mass index is 25.53 kg/m².    Intake/Output Summary (Last 24 hours) at 12/11/17 1716  Last data filed at 12/11/17 1350   Gross per 24 hour   Intake              560 ml   Output                0 ml   Net              560 ml      Physical Exam   Constitutional: She appears well-developed and well-nourished.   HENT:   Head: Normocephalic and atraumatic.   Eyes: Conjunctivae are normal. Pupils are equal, round, and reactive to light. Scleral icterus is present.   Neck: Normal range of motion. No thyromegaly present.   Cardiovascular: Normal rate, regular rhythm and normal heart sounds.    Pulmonary/Chest: Effort normal and breath sounds normal.   Abdominal: Soft.  She exhibits distension. There is no tenderness.   Musculoskeletal: Normal range of motion. She exhibits no edema.   Neurological: She is alert.   AAO times 2   Skin: No erythema. No pallor.       MELD-Na score: 34 at 12/11/2017  3:50 PM  MELD score: 32 at 12/11/2017  3:50 PM  Calculated from:  Serum Creatinine: 0.8 mg/dL (Rounded to 1) at 12/11/2017  3:50 PM  Serum Sodium: 130 mmol/L at 12/11/2017  3:50 PM  Total Bilirubin: 29.8 mg/dL at 12/11/2017  3:50 PM  INR(ratio): 3.1 at 12/11/2017  4:43 AM  Age: 52 years    Significant Labs:  CBC:    Recent Labs  Lab 12/10/17  0427 12/11/17  0443   WBC 9.63 9.32   HGB 8.6* 8.7*   HCT 24.0* 24.7*   PLT 67* 72*     CMP:    Recent Labs  Lab 12/10/17  1545 12/11/17  0443 12/11/17  1550   * 131* 130*   K 3.7 3.4* 3.9   CL 93* 93* 94*   CO2 26 29 27   * 71 117*   BUN 12 11 11   CREATININE 0.7 0.8 0.8   CALCIUM 8.8 9.0 9.4   PROT 5.9* 5.6* 6.1   ALBUMIN 3.3* 3.2* 3.3*   BILITOT 26.2* 26.2* 29.8*   ALKPHOS 156* 148* 159*   AST 53* 56* 60*   ALT 53* 51* 52*   ANIONGAP 11 9 9   EGFRNONAA >60.0 >60.0 >60.0     PTINR:    Recent Labs  Lab 12/10/17  0427 12/11/17  0443   INR 3.4* 3.1*       Significant Procedures:   Dobutamine Stress Test with Color Flow:   Results for orders placed or performed during the hospital encounter of 10/19/17   Dobutamine Stress Test w/ Color Flow   Result Value Ref Range    EF 73 55 - 65    Diastolic Dysfunction No     Est. PA Systolic Pressure 21.32     Tricuspid Valve Regurgitation TRIVIAL

## 2017-12-12 LAB
ALBUMIN SERPL BCP-MCNC: 3.1 G/DL
ALBUMIN SERPL BCP-MCNC: 3.8 G/DL
ALP SERPL-CCNC: 128 U/L
ALP SERPL-CCNC: 144 U/L
ALT SERPL W/O P-5'-P-CCNC: 41 U/L
ALT SERPL W/O P-5'-P-CCNC: 49 U/L
AMMONIA PLAS-SCNC: 34 UMOL/L
ANION GAP SERPL CALC-SCNC: 11 MMOL/L
ANION GAP SERPL CALC-SCNC: 11 MMOL/L
ANISOCYTOSIS BLD QL SMEAR: SLIGHT
APPEARANCE FLD: CLEAR
AST SERPL-CCNC: 45 U/L
AST SERPL-CCNC: 55 U/L
BASOPHILS # BLD AUTO: 0.04 K/UL
BASOPHILS NFR BLD: 0.5 %
BILIRUB SERPL-MCNC: 25.9 MG/DL
BILIRUB SERPL-MCNC: 27.5 MG/DL
BLD PROD TYP BPU: NORMAL
BLD PROD TYP BPU: NORMAL
BLOOD UNIT EXPIRATION DATE: NORMAL
BLOOD UNIT EXPIRATION DATE: NORMAL
BLOOD UNIT TYPE CODE: 600
BLOOD UNIT TYPE CODE: 6200
BLOOD UNIT TYPE: NORMAL
BLOOD UNIT TYPE: NORMAL
BODY FLD TYPE: NORMAL
BUN SERPL-MCNC: 10 MG/DL
BUN SERPL-MCNC: 10 MG/DL
CALCIUM SERPL-MCNC: 9.3 MG/DL
CALCIUM SERPL-MCNC: 9.4 MG/DL
CHLORIDE SERPL-SCNC: 93 MMOL/L
CHLORIDE SERPL-SCNC: 95 MMOL/L
CO2 SERPL-SCNC: 26 MMOL/L
CO2 SERPL-SCNC: 28 MMOL/L
CODING SYSTEM: NORMAL
CODING SYSTEM: NORMAL
COLOR FLD: YELLOW
CREAT SERPL-MCNC: 0.7 MG/DL
CREAT SERPL-MCNC: 0.8 MG/DL
DIFFERENTIAL METHOD: ABNORMAL
DISPENSE STATUS: NORMAL
DISPENSE STATUS: NORMAL
EOSINOPHIL # BLD AUTO: 0.1 K/UL
EOSINOPHIL NFR BLD: 0.9 %
ERYTHROCYTE [DISTWIDTH] IN BLOOD BY AUTOMATED COUNT: 21.9 %
EST. GFR  (AFRICAN AMERICAN): >60 ML/MIN/1.73 M^2
EST. GFR  (AFRICAN AMERICAN): >60 ML/MIN/1.73 M^2
EST. GFR  (NON AFRICAN AMERICAN): >60 ML/MIN/1.73 M^2
EST. GFR  (NON AFRICAN AMERICAN): >60 ML/MIN/1.73 M^2
GLUCOSE SERPL-MCNC: 105 MG/DL
GLUCOSE SERPL-MCNC: 83 MG/DL
HCT VFR BLD AUTO: 23.7 %
HGB BLD-MCNC: 8.4 G/DL
HYPOCHROMIA BLD QL SMEAR: ABNORMAL
IMM GRANULOCYTES # BLD AUTO: 0.03 K/UL
IMM GRANULOCYTES NFR BLD AUTO: 0.4 %
INR PPP: 3.1
LYMPHOCYTES # BLD AUTO: 3.8 K/UL
LYMPHOCYTES NFR BLD: 47.2 %
LYMPHOCYTES NFR FLD MANUAL: 78 %
MAGNESIUM SERPL-MCNC: 1.4 MG/DL
MCH RBC QN AUTO: 31.2 PG
MCHC RBC AUTO-ENTMCNC: 35.4 G/DL
MCV RBC AUTO: 88 FL
MESOTHL CELL NFR FLD MANUAL: 10 %
MONOCYTES # BLD AUTO: 1 K/UL
MONOCYTES NFR BLD: 13 %
MONOS+MACROS NFR FLD MANUAL: 12 %
NEUTROPHILS # BLD AUTO: 3 K/UL
NEUTROPHILS NFR BLD: 38 %
NRBC BLD-RTO: 0 /100 WBC
NUM UNITS TRANS FFP: NORMAL
NUM UNITS TRANS FFP: NORMAL
PHOSPHATE SERPL-MCNC: 2.7 MG/DL
PLATELET # BLD AUTO: 69 K/UL
PLATELET BLD QL SMEAR: ABNORMAL
PMV BLD AUTO: 9.9 FL
POIKILOCYTOSIS BLD QL SMEAR: SLIGHT
POLYCHROMASIA BLD QL SMEAR: ABNORMAL
POTASSIUM SERPL-SCNC: 3 MMOL/L
POTASSIUM SERPL-SCNC: 3.2 MMOL/L
PROT SERPL-MCNC: 5.8 G/DL
PROT SERPL-MCNC: 6.2 G/DL
PROTHROMBIN TIME: 31.2 SEC
RBC # BLD AUTO: 2.69 M/UL
SODIUM SERPL-SCNC: 132 MMOL/L
SODIUM SERPL-SCNC: 132 MMOL/L
TARGETS BLD QL SMEAR: ABNORMAL
WBC # BLD AUTO: 7.95 K/UL
WBC # FLD: 53 /CU MM

## 2017-12-12 PROCEDURE — 99232 SBSQ HOSP IP/OBS MODERATE 35: CPT | Mod: ,,, | Performed by: INTERNAL MEDICINE

## 2017-12-12 PROCEDURE — 97803 MED NUTRITION INDIV SUBSEQ: CPT

## 2017-12-12 PROCEDURE — P9017 PLASMA 1 DONOR FRZ W/IN 8 HR: HCPCS

## 2017-12-12 PROCEDURE — 83735 ASSAY OF MAGNESIUM: CPT

## 2017-12-12 PROCEDURE — 80053 COMPREHEN METABOLIC PANEL: CPT | Mod: 91

## 2017-12-12 PROCEDURE — 63600175 PHARM REV CODE 636 W HCPCS: Performed by: HOSPITALIST

## 2017-12-12 PROCEDURE — 25000003 PHARM REV CODE 250: Performed by: HOSPITALIST

## 2017-12-12 PROCEDURE — 82140 ASSAY OF AMMONIA: CPT

## 2017-12-12 PROCEDURE — 0W9G3ZZ DRAINAGE OF PERITONEAL CAVITY, PERCUTANEOUS APPROACH: ICD-10-PCS | Performed by: RADIOLOGY

## 2017-12-12 PROCEDURE — 85025 COMPLETE CBC W/AUTO DIFF WBC: CPT

## 2017-12-12 PROCEDURE — 25000003 PHARM REV CODE 250: Performed by: STUDENT IN AN ORGANIZED HEALTH CARE EDUCATION/TRAINING PROGRAM

## 2017-12-12 PROCEDURE — 36415 COLL VENOUS BLD VENIPUNCTURE: CPT

## 2017-12-12 PROCEDURE — 89051 BODY FLUID CELL COUNT: CPT

## 2017-12-12 PROCEDURE — P9047 ALBUMIN (HUMAN), 25%, 50ML: HCPCS | Performed by: HOSPITALIST

## 2017-12-12 PROCEDURE — 85610 PROTHROMBIN TIME: CPT

## 2017-12-12 PROCEDURE — 20600001 HC STEP DOWN PRIVATE ROOM

## 2017-12-12 PROCEDURE — 84100 ASSAY OF PHOSPHORUS: CPT

## 2017-12-12 PROCEDURE — 99233 SBSQ HOSP IP/OBS HIGH 50: CPT | Mod: ,,, | Performed by: HOSPITALIST

## 2017-12-12 RX ORDER — FUROSEMIDE 40 MG/1
40 TABLET ORAL DAILY
Status: DISCONTINUED | OUTPATIENT
Start: 2017-12-12 | End: 2017-12-16

## 2017-12-12 RX ORDER — HYDROCODONE BITARTRATE AND ACETAMINOPHEN 500; 5 MG/1; MG/1
TABLET ORAL
Status: DISCONTINUED | OUTPATIENT
Start: 2017-12-12 | End: 2017-12-13

## 2017-12-12 RX ORDER — MAGNESIUM SULFATE HEPTAHYDRATE 40 MG/ML
2 INJECTION, SOLUTION INTRAVENOUS ONCE
Status: COMPLETED | OUTPATIENT
Start: 2017-12-12 | End: 2017-12-12

## 2017-12-12 RX ORDER — ALBUMIN HUMAN 250 G/1000ML
25 SOLUTION INTRAVENOUS
Status: DISCONTINUED | OUTPATIENT
Start: 2017-12-12 | End: 2017-12-12

## 2017-12-12 RX ORDER — SPIRONOLACTONE 100 MG/1
100 TABLET, FILM COATED ORAL DAILY
Status: DISCONTINUED | OUTPATIENT
Start: 2017-12-12 | End: 2017-12-16

## 2017-12-12 RX ADMIN — ALBUMIN (HUMAN) 12.5 G: 25 SOLUTION INTRAVENOUS at 04:12

## 2017-12-12 RX ADMIN — Medication 220 MG: at 10:12

## 2017-12-12 RX ADMIN — PANTOPRAZOLE SODIUM 40 MG: 40 TABLET, DELAYED RELEASE ORAL at 10:12

## 2017-12-12 RX ADMIN — RIFAXIMIN 550 MG: 550 TABLET ORAL at 09:12

## 2017-12-12 RX ADMIN — LACTULOSE 30 G: 20 SOLUTION ORAL at 06:12

## 2017-12-12 RX ADMIN — POTASSIUM BICARBONATE 50 MEQ: 25 TABLET, EFFERVESCENT ORAL at 10:12

## 2017-12-12 RX ADMIN — PREDNISONE 5 MG: 5 TABLET ORAL at 10:12

## 2017-12-12 RX ADMIN — MIDODRINE HYDROCHLORIDE 5 MG: 5 TABLET ORAL at 06:12

## 2017-12-12 RX ADMIN — MIDODRINE HYDROCHLORIDE 5 MG: 5 TABLET ORAL at 09:12

## 2017-12-12 RX ADMIN — RIFAXIMIN 550 MG: 550 TABLET ORAL at 10:12

## 2017-12-12 RX ADMIN — SPIRONOLACTONE 100 MG: 100 TABLET ORAL at 10:12

## 2017-12-12 RX ADMIN — FUROSEMIDE 40 MG: 40 TABLET ORAL at 10:12

## 2017-12-12 RX ADMIN — ALBUMIN (HUMAN) 25 G: 25 SOLUTION INTRAVENOUS at 04:12

## 2017-12-12 RX ADMIN — MIDODRINE HYDROCHLORIDE 5 MG: 5 TABLET ORAL at 02:12

## 2017-12-12 RX ADMIN — MAGNESIUM SULFATE IN WATER 2 G: 40 INJECTION, SOLUTION INTRAVENOUS at 10:12

## 2017-12-12 NOTE — PLAN OF CARE
Problem: Infection, Risk/Actual (Adult)  Intervention: Prevent Infection/Maximize Resistance  Pt had uneventful night. Currently receiving ordered FFP's. Tolerating well. Denies any pain throughout night. Pt more appears confused than previously. Getting confused about time. Delayed responses. Vital signs stable. Bed locked and alarmed. Call light in reach. Will continue to monitor.

## 2017-12-12 NOTE — PROGRESS NOTES
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 Patient presented to [facility] with generalized weakness and was admitted to  11/10 for hyponatremia seen on labs in GI office. Discharged on 11/15 and returned on 11/20 with 1 week worsening weakness, malaise, lethargy, and decreased appetite. Denied fever/chills, abdominal pain, bright red or black tarry stools, hematemesis or other abnormal bleeding, confusion or disorientation, changes in urination, light-headedness, headache, changes in vision, dyspnea, chest pain or palpitations. Labs demonstrated Na 116 and MELD 32; patient admitted for hyponatremia. Nephrology consulted, recommended fluid restriction , holding diuretics, and prednisode taper that started 11/23 (decreased from 40mg QD to 20mg Qd.) Patient transferred to [facility] for revaluation of liver disease. Of note, paracentesis performed 11/22, removed 2L, negative for SBP.     Follows with Dr. Souza for cirrhosis. Since early 09/2017, patient's liver failure progressing indicated by worsening clinical signs (abdominal distention, weakness/fatigue) and MELD. She had been evaluated by [facility] liver transplant committee 10/25/2017 and declined transplant candidacy 2/2 continued EtOH use and lack of solid caregiver support/plan.She has since been enrolled in in-patient substance abuse program at [facility] where she has remained since early November; per patient, her last EtOH beverage  "was 11/01/2017.  Per chart review on 11/22/17:     "Patient's case was discussed in liver selection committee today. Per committee discussion, the SW will call patient's daughter to discuss caregiver plan at this time. If the caregiver plan is adequate, patient will need to follow up with either/both addiction psych or SW regarding substance abuse and plan. May need to do this inpatient if patient is still admitted."        Denies history of GI bleed, SBP, renal disease, or other complications of cirrhosis. Liver biopsy 10/24 demonstrated stage 4 cirrhosis. Records reports EGD in August 2016 that showed small esophageal varices with severe gastritis. Colonoscopy 2015 with 2 polyps and internal hemorrhoids Started on prednisone 40mg QD on 10/25; was on spironolactone, has been held since 11/10 for hypoNa; on lactulose.    Interval History:     Feels well today.  Friend at bedside.  No complaints.    Current Facility-Administered Medications   Medication    0.9%  NaCl infusion (for blood administration)    furosemide tablet 40 mg    GI cocktail (mylanta 30 mL, lidocaine 2 % viscous 10 mL, dicyclomine 10 mL) 50 mL    lactulose 20 gram/30 mL solution Soln 30 g    midodrine tablet 5 mg    ondansetron disintegrating tablet 4 mg    ondansetron injection 4 mg    pantoprazole EC tablet 40 mg    potassium bicarbonate disintegrating tablet 50 mEq    predniSONE tablet 5 mg    rifAXIMin tablet 550 mg    simethicone chewable tablet 80 mg    sodium chloride 0.9% flush 3 mL    sodium chloride 0.9% flush 3 mL    sodium chloride 0.9% flush 5 mL    spironolactone tablet 100 mg    zinc sulfate capsule 220 mg       Objective:     Vital Signs (Most Recent):  Temp: 98.2 °F (36.8 °C) (12/12/17 1232)  Pulse: 96 (12/12/17 1232)  Resp: 18 (12/12/17 1232)  BP: (!) 113/54 (12/12/17 1232)  SpO2: 100 % (12/12/17 1232) Vital Signs (24h Range):  Temp:  [97 °F (36.1 °C)-98.7 °F (37.1 °C)] 98.2 °F (36.8 °C)  Pulse:  [] " 96  Resp:  [16-18] 18  SpO2:  [96 %-100 %] 100 %  BP: ()/(51-74) 113/54     Weight: 69.6 kg (153 lb 7 oz) (12/12/17 0700)  Body mass index is 25.53 kg/m².    Physical Exam   Constitutional: She is oriented to person, place, and time. She appears ill. No distress.   Eyes: Scleral icterus is present.   Cardiovascular: Normal rate.  Exam reveals no friction rub.    Pulmonary/Chest: Effort normal. No respiratory distress.   Abdominal: Soft. Bowel sounds are normal. She exhibits distension. There is no tenderness. There is no rebound and no guarding.   Musculoskeletal: She exhibits edema.   Neurological: She is alert and oriented to person, place, and time.   Vitals reviewed.      MELD-Na score: 33 at 12/12/2017  4:46 AM  MELD score: 32 at 12/12/2017  4:46 AM  Calculated from:  Serum Creatinine: 0.7 mg/dL (Rounded to 1) at 12/12/2017  4:46 AM  Serum Sodium: 132 mmol/L at 12/12/2017  4:46 AM  Total Bilirubin: 27.5 mg/dL at 12/12/2017  4:46 AM  INR(ratio): 3.1 at 12/12/2017  4:46 AM  Age: 52 years    Significant Labs:  CBC:   Recent Labs  Lab 12/12/17  0446   WBC 7.95   RBC 2.69*   HGB 8.4*   HCT 23.7*   PLT 69*     CMP:   Recent Labs  Lab 12/12/17  0446   GLU 83   CALCIUM 9.3   ALBUMIN 3.1*   PROT 5.8*   *   K 3.2*   CO2 26   CL 95   BUN 10   CREATININE 0.7   ALKPHOS 144*   ALT 49*   AST 55*   BILITOT 27.5*     Coagulation:   Recent Labs  Lab 12/12/17  0446   INR 3.1*       Significant Imaging:  Labs: Reviewed    Assessment/Plan:     * Decompensated hepatic cirrhosis    # Decompensated Cirrhosis due to alcohol, in remission.  # Ascites  # Hepatic encephalopathy  # Esophageal varices - banded during this admission.  # Anemia - multifactorial  # HCC - 1cm liver mass on tpCT on 10/20/17  # Liver transplant candidacy - Completed workup.   # Addiction risk - was doing inpatient rehab. Seen by addiction psych, thought to have improved insight.       Plan:  Approved for listing pending mammogram results.    -  transplant coordinators working to obtain OSH films  Paracentesis today  1.5 L fluid restriction  Add lasix 40 and aldactone 100 daily  Consult to dietician for nutritional supplement suggestions.  Cont empiric fluconazole  Continue lactulose and rifaximin and zinc  Continue PPI and PRN gi cocktail     General recommendations:  · Daily CBC, CMP and INR for MELD calculation  · Frequent finger stick blood sugar check if active hepatic encepahalopathy  · Avoid narcotics and sedatives  · Low Na diet              Thank you for your consult. I will follow-up with patient. Please contact us if you have any additional questions.    Stalin Powers MD  Hepatology  [facility]

## 2017-12-12 NOTE — SUBJECTIVE & OBJECTIVE
Interval History:     Feels well today.  Friend at bedside.  No complaints.    Current Facility-Administered Medications   Medication    0.9%  NaCl infusion (for blood administration)    furosemide tablet 40 mg    GI cocktail (mylanta 30 mL, lidocaine 2 % viscous 10 mL, dicyclomine 10 mL) 50 mL    lactulose 20 gram/30 mL solution Soln 30 g    midodrine tablet 5 mg    ondansetron disintegrating tablet 4 mg    ondansetron injection 4 mg    pantoprazole EC tablet 40 mg    potassium bicarbonate disintegrating tablet 50 mEq    predniSONE tablet 5 mg    rifAXIMin tablet 550 mg    simethicone chewable tablet 80 mg    sodium chloride 0.9% flush 3 mL    sodium chloride 0.9% flush 3 mL    sodium chloride 0.9% flush 5 mL    spironolactone tablet 100 mg    zinc sulfate capsule 220 mg       Objective:     Vital Signs (Most Recent):  Temp: 98.2 °F (36.8 °C) (12/12/17 1232)  Pulse: 96 (12/12/17 1232)  Resp: 18 (12/12/17 1232)  BP: (!) 113/54 (12/12/17 1232)  SpO2: 100 % (12/12/17 1232) Vital Signs (24h Range):  Temp:  [97 °F (36.1 °C)-98.7 °F (37.1 °C)] 98.2 °F (36.8 °C)  Pulse:  [] 96  Resp:  [16-18] 18  SpO2:  [96 %-100 %] 100 %  BP: ()/(51-74) 113/54     Weight: 69.6 kg (153 lb 7 oz) (12/12/17 0700)  Body mass index is 25.53 kg/m².    Physical Exam   Constitutional: She is oriented to person, place, and time. She appears ill. No distress.   Eyes: Scleral icterus is present.   Cardiovascular: Normal rate.  Exam reveals no friction rub.    Pulmonary/Chest: Effort normal. No respiratory distress.   Abdominal: Soft. Bowel sounds are normal. She exhibits distension. There is no tenderness. There is no rebound and no guarding.   Musculoskeletal: She exhibits edema.   Neurological: She is alert and oriented to person, place, and time.   Vitals reviewed.      MELD-Na score: 33 at 12/12/2017  4:46 AM  MELD score: 32 at 12/12/2017  4:46 AM  Calculated from:  Serum Creatinine: 0.7 mg/dL (Rounded to 1) at  12/12/2017  4:46 AM  Serum Sodium: 132 mmol/L at 12/12/2017  4:46 AM  Total Bilirubin: 27.5 mg/dL at 12/12/2017  4:46 AM  INR(ratio): 3.1 at 12/12/2017  4:46 AM  Age: 52 years    Significant Labs:  CBC:   Recent Labs  Lab 12/12/17 0446   WBC 7.95   RBC 2.69*   HGB 8.4*   HCT 23.7*   PLT 69*     CMP:   Recent Labs  Lab 12/12/17 0446   GLU 83   CALCIUM 9.3   ALBUMIN 3.1*   PROT 5.8*   *   K 3.2*   CO2 26   CL 95   BUN 10   CREATININE 0.7   ALKPHOS 144*   ALT 49*   AST 55*   BILITOT 27.5*     Coagulation:   Recent Labs  Lab 12/12/17 0446   INR 3.1*       Significant Imaging:  Labs: Reviewed

## 2017-12-12 NOTE — ASSESSMENT & PLAN NOTE
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  # Ascites  # Hepatic encephalopathy  # Esophageal varices - banded during this admission.  # Anemia - multifactorial  # HCC - 1cm liver mass on tpCT on 10/20/17  # Liver transplant candidacy - Completed workup.   # Addiction risk - was doing inpatient rehab. Seen by addiction psych, thought to have improved insight.       Plan:  Approved for listing pending mammogram results.    - transplant coordinators working to obtain OSH films  Paracentesis today  1.5 L fluid restriction  Add lasix 40 and aldactone 100 daily  Consult to dietician for nutritional supplement suggestions.  Cont empiric fluconazole  Continue lactulose and rifaximin and zinc  Continue PPI and PRN gi cocktail     General recommendations:  · Daily CBC, CMP and INR for MELD calculation  · Frequent finger stick blood sugar check if active hepatic encepahalopathy  · Avoid narcotics and sedatives  · Low Na diet

## 2017-12-12 NOTE — PROGRESS NOTES
Paracentesis complete. 5200 mLs peritoneal fluid drained. Pt tolerated well. Dressing to clean, dry, and intact. Albumin 25% given 150 mLs. Specimens sent per lab order. Report called to primary nurse.

## 2017-12-12 NOTE — PLAN OF CARE
12/12/17 1209   Discharge Reassessment   Assessment Type Discharge Planning Reassessment   Provided patient/caregiver education on the expected discharge date and the discharge plan Yes   Do you have any problems affording any of your prescribed medications? No   Discharge Plan A (transfer to LTS)   Discharge Plan B Home with family;Home Health   Can the patient answer the patient profile reliably? Yes, cognitively intact   How does the patient rate their overall health at the present time? Fair   Describe the patient's ability to walk at the present time. Walks with the help of equipment   How often would a person be available to care for the patient? Often   Number of comorbid conditions (as recorded on the chart) Five or more   During the past month, has the patient often been bothered by feeling down, depressed or hopeless? No   During the past month, has the patient often been bothered by little interest or pleasure in doing things? No

## 2017-12-12 NOTE — PROGRESS NOTES
"  [facility]  Adult Nutrition  Consult Note    SUMMARY     Recommendations    1. Continue current 2 gram sodium diet + Boost Plus ONS (fluid restriction per MD).   2. Encourage adequate PO intake as tolerated.  3. RD to monitor & follow-up.    Goals: PO intake >50%  Nutrition Goal Status: new  Communication of RD Recs: reviewed with RN    Reason for Assessment    Reason for Assessment: RD follow-up  Diagnosis:  (Decompensated hepatic cirrhosis )  Relevent Medical History: EtOH Hepatits, Cirrhosis, ESRD, Tx candidate,    Interdisciplinary Rounds: did not attend     General Information Comments: Pt disoriented this AM. Per RN notes, pt consuming 25-75% of meals. Boost Plus ONS ordered.  Nutrition Discharge Planning: Adequate PO intake    Nutrition Prescription Ordered    Current Diet Order: 2 gram Na  Nutrition Order Comments: 1000 mL FR     Oral Nutrition Supplement: Boost Plus ONS     Nutrition/Diet History    Patient Reported Diet/Restrictions/Preferences: low salt     Typical Food/Fluid Intake: decreased appetite, but "never was a big eater"     Food Preferences: No cultural or Scientology preferences noted     Factors Affecting Nutritional Intake: decreased appetite    Labs/Tests/Procedures/Meds    Pertinent Labs Reviewed: reviewed  Pertinent Labs Comments: Na 132, Bili 27.5  Pertinent Medications Reviewed: reviewed, pertinent  Pertinent Medications Comments: Prednisone    Physical Findings    Overall Physical Appearance: weak, lethargic  Oral/Mouth Cavity: WDL  Skin: intact    Anthropometrics    Height: 5' 5" (165.1 cm)  Weight Method: Bed Scale  Weight: 69.6 kg (153 lb 7 oz)     Ideal Body Weight (IBW), Female: 125 lb  % Ideal Body Weight, Female (lb): 122.75 lb     BMI (Calculated): 25.6  BMI Grade: 25 - 29.9 - overweight     Usual Body Weight (UBW), k kg  Weight Change Amount: 22 lb 0.7 oz    Estimated/Assessed Needs    Weight Used For Calorie Calculations: 69.6 kg (153 lb 7 oz) "   Height (cm): 165.1 cm    Energy Calorie Requirements (kcal): 1705  Energy Need Method: McKenzie-St Jeor (X 1.25)     Weight Used For Protein Calculations: 69.6 kg (153 lb 7 oz)  Protein Requirements: 70-84g/d    Fluid Requirements (mL): per MD    Assessment and Plan    Severe protein-calorie malnutrition     Related to (etiology):   Altered nutrient utiliztion 2/2 EtOH abuse     Signs and Symptoms (as evidenced by):   EtOH Cirrhosis, Hepatitis Elevated labs(BUN, Alk Phos, T.hayley. AST, ALT)     Nutrition Diagnosis Status:   Continues     Monitor and Evaluation    Food and Nutrient Intake: energy intake, food and beverage intake  Food and Nutrient Adminstration: diet order     Physical Activity and Function: nutrition-related ADLs and IADLs  Anthropometric Measurements: weight, weight change  Biochemical Data, Medical Tests and Procedures: lipid profile, inflammatory profile, glucose/endocrine profile, gastrointestinal profile, electrolyte and renal panel  Nutrition-Focused Physical Findings: overall appearance    Nutrition Risk    Level of Risk:  (f/u 1x wk)    Nutrition Follow-Up    RD Follow-up?: Yes

## 2017-12-12 NOTE — PROCEDURES
Radiology Post-Procedure Note    Pre Op Diagnosis: Ascites  Post Op Diagnosis: Same    Procedure: Ultrasound Guided Paracentesis    Procedure performed by: Mir ALBRIGHT, Scarlett     Written Informed Consent Obtained: Yes  Specimen Removed: YES clear yellow  Estimated Blood Loss: Minimal    Findings:   Successful paracentesis.  Albumin administered PRN per protocol.    Patient tolerated procedure well.    Scarlett Hester, APRN, FNP  Interventional Radiology  [phone] [employer]

## 2017-12-13 LAB
ALBUMIN SERPL BCP-MCNC: 3.2 G/DL
ALBUMIN SERPL BCP-MCNC: 3.3 G/DL
ALP SERPL-CCNC: 137 U/L
ALP SERPL-CCNC: 163 U/L
ALT SERPL W/O P-5'-P-CCNC: 35 U/L
ALT SERPL W/O P-5'-P-CCNC: 41 U/L
ANION GAP SERPL CALC-SCNC: 12 MMOL/L
ANION GAP SERPL CALC-SCNC: 8 MMOL/L
ANISOCYTOSIS BLD QL SMEAR: SLIGHT
AST SERPL-CCNC: 41 U/L
AST SERPL-CCNC: 49 U/L
BACTERIA SPEC ANAEROBE CULT: NORMAL
BASOPHILS # BLD AUTO: 0.04 K/UL
BASOPHILS NFR BLD: 0.5 %
BILIRUB SERPL-MCNC: 24.9 MG/DL
BILIRUB SERPL-MCNC: 28.7 MG/DL
BUN SERPL-MCNC: 10 MG/DL
BUN SERPL-MCNC: 10 MG/DL
BURR CELLS BLD QL SMEAR: ABNORMAL
CALCIUM SERPL-MCNC: 8.9 MG/DL
CALCIUM SERPL-MCNC: 9.5 MG/DL
CHLORIDE SERPL-SCNC: 95 MMOL/L
CHLORIDE SERPL-SCNC: 96 MMOL/L
CO2 SERPL-SCNC: 28 MMOL/L
CO2 SERPL-SCNC: 29 MMOL/L
CREAT SERPL-MCNC: 0.7 MG/DL
CREAT SERPL-MCNC: 0.8 MG/DL
DIFFERENTIAL METHOD: ABNORMAL
EOSINOPHIL # BLD AUTO: 0.1 K/UL
EOSINOPHIL NFR BLD: 0.9 %
ERYTHROCYTE [DISTWIDTH] IN BLOOD BY AUTOMATED COUNT: 21.6 %
EST. GFR  (AFRICAN AMERICAN): >60 ML/MIN/1.73 M^2
EST. GFR  (AFRICAN AMERICAN): >60 ML/MIN/1.73 M^2
EST. GFR  (NON AFRICAN AMERICAN): >60 ML/MIN/1.73 M^2
EST. GFR  (NON AFRICAN AMERICAN): >60 ML/MIN/1.73 M^2
GLUCOSE SERPL-MCNC: 103 MG/DL
GLUCOSE SERPL-MCNC: 95 MG/DL
HCT VFR BLD AUTO: 23.8 %
HGB BLD-MCNC: 8.5 G/DL
HYPOCHROMIA BLD QL SMEAR: ABNORMAL
IMM GRANULOCYTES # BLD AUTO: 0.03 K/UL
IMM GRANULOCYTES NFR BLD AUTO: 0.3 %
INR PPP: 2.8
LYMPHOCYTES # BLD AUTO: 3.8 K/UL
LYMPHOCYTES NFR BLD: 44.2 %
MAGNESIUM SERPL-MCNC: 1.4 MG/DL
MCH RBC QN AUTO: 31.6 PG
MCHC RBC AUTO-ENTMCNC: 35.7 G/DL
MCV RBC AUTO: 89 FL
MONOCYTES # BLD AUTO: 1.1 K/UL
MONOCYTES NFR BLD: 12.3 %
NEUTROPHILS # BLD AUTO: 3.6 K/UL
NEUTROPHILS NFR BLD: 41.8 %
NRBC BLD-RTO: 0 /100 WBC
OVALOCYTES BLD QL SMEAR: ABNORMAL
PHOSPHATE SERPL-MCNC: 2.6 MG/DL
PLATELET # BLD AUTO: 70 K/UL
PLATELET BLD QL SMEAR: ABNORMAL
PMV BLD AUTO: 10.2 FL
POIKILOCYTOSIS BLD QL SMEAR: SLIGHT
POTASSIUM SERPL-SCNC: 2.8 MMOL/L
POTASSIUM SERPL-SCNC: 3.5 MMOL/L
PROT SERPL-MCNC: 5.5 G/DL
PROT SERPL-MCNC: 6 G/DL
PROTHROMBIN TIME: 27.8 SEC
RBC # BLD AUTO: 2.69 M/UL
SODIUM SERPL-SCNC: 132 MMOL/L
SODIUM SERPL-SCNC: 136 MMOL/L
WBC # BLD AUTO: 8.6 K/UL

## 2017-12-13 PROCEDURE — 20600001 HC STEP DOWN PRIVATE ROOM

## 2017-12-13 PROCEDURE — 85610 PROTHROMBIN TIME: CPT

## 2017-12-13 PROCEDURE — 84100 ASSAY OF PHOSPHORUS: CPT

## 2017-12-13 PROCEDURE — 25000003 PHARM REV CODE 250: Performed by: HOSPITALIST

## 2017-12-13 PROCEDURE — 99232 SBSQ HOSP IP/OBS MODERATE 35: CPT | Mod: ,,, | Performed by: INTERNAL MEDICINE

## 2017-12-13 PROCEDURE — 25000003 PHARM REV CODE 250: Performed by: STUDENT IN AN ORGANIZED HEALTH CARE EDUCATION/TRAINING PROGRAM

## 2017-12-13 PROCEDURE — 80053 COMPREHEN METABOLIC PANEL: CPT

## 2017-12-13 PROCEDURE — 63600175 PHARM REV CODE 636 W HCPCS: Performed by: HOSPITALIST

## 2017-12-13 PROCEDURE — 36415 COLL VENOUS BLD VENIPUNCTURE: CPT

## 2017-12-13 PROCEDURE — 85025 COMPLETE CBC W/AUTO DIFF WBC: CPT

## 2017-12-13 PROCEDURE — 99233 SBSQ HOSP IP/OBS HIGH 50: CPT | Mod: ,,, | Performed by: HOSPITALIST

## 2017-12-13 PROCEDURE — 83735 ASSAY OF MAGNESIUM: CPT

## 2017-12-13 RX ORDER — LACTULOSE 10 G/15ML
200 SOLUTION ORAL; RECTAL ONCE
Status: DISCONTINUED | OUTPATIENT
Start: 2017-12-13 | End: 2017-12-15

## 2017-12-13 RX ORDER — SODIUM,POTASSIUM PHOSPHATES 280-250MG
1 POWDER IN PACKET (EA) ORAL
Status: DISPENSED | OUTPATIENT
Start: 2017-12-13 | End: 2017-12-14

## 2017-12-13 RX ORDER — MAGNESIUM SULFATE HEPTAHYDRATE 40 MG/ML
2 INJECTION, SOLUTION INTRAVENOUS
Status: COMPLETED | OUTPATIENT
Start: 2017-12-13 | End: 2017-12-13

## 2017-12-13 RX ORDER — LACTULOSE 10 G/15ML
200 SOLUTION ORAL; RECTAL ONCE
Status: COMPLETED | OUTPATIENT
Start: 2017-12-13 | End: 2017-12-13

## 2017-12-13 RX ORDER — POTASSIUM CHLORIDE 20 MEQ/1
40 TABLET, EXTENDED RELEASE ORAL
Status: DISPENSED | OUTPATIENT
Start: 2017-12-13 | End: 2017-12-13

## 2017-12-13 RX ADMIN — MIDODRINE HYDROCHLORIDE 5 MG: 5 TABLET ORAL at 06:12

## 2017-12-13 RX ADMIN — POTASSIUM & SODIUM PHOSPHATES POWDER PACK 280-160-250 MG 1 PACKET: 280-160-250 PACK at 06:12

## 2017-12-13 RX ADMIN — LACTULOSE 30 G: 20 SOLUTION ORAL at 04:12

## 2017-12-13 RX ADMIN — POTASSIUM BICARBONATE 50 MEQ: 25 TABLET, EFFERVESCENT ORAL at 09:12

## 2017-12-13 RX ADMIN — MIDODRINE HYDROCHLORIDE 5 MG: 5 TABLET ORAL at 10:12

## 2017-12-13 RX ADMIN — RIFAXIMIN 550 MG: 550 TABLET ORAL at 10:12

## 2017-12-13 RX ADMIN — MAGNESIUM SULFATE IN WATER 2 G: 40 INJECTION, SOLUTION INTRAVENOUS at 11:12

## 2017-12-13 RX ADMIN — Medication 220 MG: at 09:12

## 2017-12-13 RX ADMIN — LACTULOSE 200 G: 10 SOLUTION ORAL at 11:12

## 2017-12-13 RX ADMIN — PREDNISONE 5 MG: 5 TABLET ORAL at 09:12

## 2017-12-13 RX ADMIN — LACTULOSE 30 G: 20 SOLUTION ORAL at 10:12

## 2017-12-13 RX ADMIN — PANTOPRAZOLE SODIUM 40 MG: 40 TABLET, DELAYED RELEASE ORAL at 09:12

## 2017-12-13 RX ADMIN — POTASSIUM CHLORIDE 40 MEQ: 1500 TABLET, EXTENDED RELEASE ORAL at 09:12

## 2017-12-13 RX ADMIN — MAGNESIUM SULFATE IN WATER 2 G: 40 INJECTION, SOLUTION INTRAVENOUS at 09:12

## 2017-12-13 RX ADMIN — MIDODRINE HYDROCHLORIDE 5 MG: 5 TABLET ORAL at 04:12

## 2017-12-13 RX ADMIN — SPIRONOLACTONE 100 MG: 100 TABLET ORAL at 09:12

## 2017-12-13 RX ADMIN — FUROSEMIDE 40 MG: 40 TABLET ORAL at 09:12

## 2017-12-13 RX ADMIN — RIFAXIMIN 550 MG: 550 TABLET ORAL at 09:12

## 2017-12-13 RX ADMIN — LACTULOSE 30 G: 20 SOLUTION ORAL at 09:12

## 2017-12-13 NOTE — PT/OT/SLP PROGRESS
"Physical Therapy      Patient Name:  Marcie Bazan   MRN:  [id number]    Patient not seen today secondary to not being appropriate per nurse consult "She has been difficult to arouse all day, so anything you say to her she won't remember. Also, she had an enema that hasn't yielded results yet." Will follow-up at next scheduled time..    Khoi Melendrez, PT   12/13/2017      "

## 2017-12-13 NOTE — SUBJECTIVE & OBJECTIVE
Interval History: patient seen and examined at bedside, no acute events overnight. Awaiting previous mammograms prior to listing.     Review of Systems   Constitutional: Negative for chills and fever.   HENT: Negative for rhinorrhea and sore throat.    Eyes: Negative for pain and discharge.   Respiratory: Negative for cough and shortness of breath.    Cardiovascular: Negative for chest pain and leg swelling.   Gastrointestinal: Positive for abdominal distention. Negative for abdominal pain, blood in stool, diarrhea, nausea and vomiting.   Endocrine: Negative for cold intolerance and heat intolerance.   Genitourinary: Negative for dysuria and flank pain.   Neurological: Negative for dizziness and numbness.   Psychiatric/Behavioral: Negative for behavioral problems and confusion.     Objective:     Vital Signs (Most Recent):  Temp: 98.3 °F (36.8 °C) (12/12/17 1956)  Pulse: 85 (12/12/17 1956)  Resp: 17 (12/12/17 1956)  BP: (!) 105/54 (12/12/17 1956)  SpO2: 100 % (12/12/17 1956) Vital Signs (24h Range):  Temp:  [97 °F (36.1 °C)-98.7 °F (37.1 °C)] 98.3 °F (36.8 °C)  Pulse:  [] 85  Resp:  [16-18] 17  SpO2:  [96 %-100 %] 100 %  BP: ()/(51-65) 105/54     Weight: 69.6 kg (153 lb 7 oz)  Body mass index is 25.53 kg/m².    Intake/Output Summary (Last 24 hours) at 12/12/17 2033  Last data filed at 12/12/17 1632   Gross per 24 hour   Intake           672.42 ml   Output             5200 ml   Net         -4527.58 ml      Physical Exam   Constitutional: She appears well-developed and well-nourished.   HENT:   Head: Normocephalic and atraumatic.   Eyes: Conjunctivae are normal. Pupils are equal, round, and reactive to light. Scleral icterus is present.   Neck: Normal range of motion. No thyromegaly present.   Cardiovascular: Normal rate, regular rhythm and normal heart sounds.    Pulmonary/Chest: Effort normal and breath sounds normal.   Abdominal: Soft. She exhibits distension. There is no tenderness.   Musculoskeletal:  Normal range of motion. She exhibits no edema.   Neurological: She is alert.   AAO times 2   Skin: No erythema. No pallor.       MELD-Na score: 32 at 12/12/2017  6:10 PM  MELD score: 31 at 12/12/2017  6:10 PM  Calculated from:  Serum Creatinine: 0.8 mg/dL (Rounded to 1) at 12/12/2017  6:10 PM  Serum Sodium: 132 mmol/L at 12/12/2017  6:10 PM  Total Bilirubin: 25.9 mg/dL at 12/12/2017  6:10 PM  INR(ratio): 3.1 at 12/12/2017  4:46 AM  Age: 52 years    Significant Labs:  CBC:    Recent Labs  Lab 12/11/17 0443 12/12/17 0446   WBC 9.32 7.95   HGB 8.7* 8.4*   HCT 24.7* 23.7*   PLT 72* 69*     CMP:    Recent Labs  Lab 12/11/17  1550 12/12/17  0446 12/12/17  1810   * 132* 132*   K 3.9 3.2* 3.0*   CL 94* 95 93*   CO2 27 26 28   * 83 105   BUN 11 10 10   CREATININE 0.8 0.7 0.8   CALCIUM 9.4 9.3 9.4   PROT 6.1 5.8* 6.2   ALBUMIN 3.3* 3.1* 3.8   BILITOT 29.8* 27.5* 25.9*   ALKPHOS 159* 144* 128   AST 60* 55* 45*   ALT 52* 49* 41   ANIONGAP 9 11 11   EGFRNONAA >60.0 >60.0 >60.0     PTINR:    Recent Labs  Lab 12/11/17 0443 12/12/17 0446   INR 3.1* 3.1*       Significant Procedures:   Dobutamine Stress Test with Color Flow:   Results for orders placed or performed during the hospital encounter of 10/19/17   Dobutamine Stress Test w/ Color Flow   Result Value Ref Range    EF 73 55 - 65    Diastolic Dysfunction No     Est. PA Systolic Pressure 21.32     Tricuspid Valve Regurgitation TRIVIAL

## 2017-12-13 NOTE — PROGRESS NOTES
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 Patient presented to [facility] with generalized weakness and was admitted to  11/10 for hyponatremia seen on labs in GI office. Discharged on 11/15 and returned on 11/20 with 1 week worsening weakness, malaise, lethargy, and decreased appetite. Denied fever/chills, abdominal pain, bright red or black tarry stools, hematemesis or other abnormal bleeding, confusion or disorientation, changes in urination, light-headedness, headache, changes in vision, dyspnea, chest pain or palpitations. Labs demonstrated Na 116 and MELD 32; patient admitted for hyponatremia. Nephrology consulted, recommended fluid restriction , holding diuretics, and prednisode taper that started 11/23 (decreased from 40mg QD to 20mg Qd.) Patient transferred to [facility] for revaluation of liver disease. Of note, paracentesis performed 11/22, removed 2L, negative for SBP.     Follows with Dr. Souza for cirrhosis. Since early 09/2017, patient's liver failure progressing indicated by worsening clinical signs (abdominal distention, weakness/fatigue) and MELD. She had been evaluated by [facility] liver transplant committee 10/25/2017 and declined transplant candidacy 2/2 continued EtOH use and lack of solid caregiver support/plan.She has since been enrolled in in-patient substance abuse program at [facility] where she has remained since early November; per patient, her last EtOH beverage  "was 11/01/2017.  Per chart review on 11/22/17:     "Patient's case was discussed in liver selection committee today. Per committee discussion, the SW will call patient's daughter to discuss caregiver plan at this time. If the caregiver plan is adequate, patient will need to follow up with either/both addiction psych or SW regarding substance abuse and plan. May need to do this inpatient if patient is still admitted."        Denies history of GI bleed, SBP, renal disease, or other complications of cirrhosis. Liver biopsy 10/24 demonstrated stage 4 cirrhosis. Records reports EGD in August 2016 that showed small esophageal varices with severe gastritis. Colonoscopy 2015 with 2 polyps and internal hemorrhoids Started on prednisone 40mg QD on 10/25; was on spironolactone, has been held since 11/10 for hypoNa; on lactulose.    Interval History:     More confused this am and more somnolent.    Current Facility-Administered Medications   Medication    furosemide tablet 40 mg    GI cocktail (mylanta 30 mL, lidocaine 2 % viscous 10 mL, dicyclomine 10 mL) 50 mL    lactulose 20 gram/30 mL solution Soln 30 g    midodrine tablet 5 mg    ondansetron disintegrating tablet 4 mg    ondansetron injection 4 mg    pantoprazole EC tablet 40 mg    potassium bicarbonate disintegrating tablet 50 mEq    potassium, sodium phosphates 280-160-250 mg packet 1 packet    predniSONE tablet 5 mg    rifAXIMin tablet 550 mg    simethicone chewable tablet 80 mg    sodium chloride 0.9% flush 3 mL    sodium chloride 0.9% flush 3 mL    sodium chloride 0.9% flush 5 mL    spironolactone tablet 100 mg    zinc sulfate capsule 220 mg       Objective:     Vital Signs (Most Recent):  Temp: 97.9 °F (36.6 °C) (12/13/17 1150)  Pulse: 94 (12/13/17 1150)  Resp: 16 (12/13/17 1150)  BP: 123/70 (12/13/17 1150)  SpO2: (!) 94 % (12/13/17 1150) Vital Signs (24h Range):  Temp:  [97.9 °F (36.6 °C)-98.5 °F (36.9 °C)] 97.9 °F (36.6 °C)  Pulse:  [85-94] " 94  Resp:  [16-18] 16  SpO2:  [94 %-100 %] 94 %  BP: (101-123)/(51-70) 123/70     Weight: 69.6 kg (153 lb 7 oz) (12/12/17 0700)  Body mass index is 25.53 kg/m².    Physical Exam   Constitutional: She appears ill. No distress.   Eyes: Scleral icterus is present.   Cardiovascular: Normal rate.  Exam reveals no friction rub.    Pulmonary/Chest: Effort normal. No respiratory distress.   Abdominal: Soft. Bowel sounds are normal. She exhibits distension. There is no tenderness. There is no guarding.   Musculoskeletal: She exhibits edema.   Neurological: She is alert.   More somnolent and slow to respond; + asterixis   Vitals reviewed.      MELD-Na score: 32 at 12/13/2017  4:06 AM  MELD score: 30 at 12/13/2017  4:06 AM  Calculated from:  Serum Creatinine: 0.7 mg/dL (Rounded to 1) at 12/13/2017  4:06 AM  Serum Sodium: 132 mmol/L at 12/13/2017  4:06 AM  Total Bilirubin: 24.9 mg/dL at 12/13/2017  4:06 AM  INR(ratio): 2.8 at 12/13/2017  4:06 AM  Age: 52 years    Significant Labs:  CBC:   Recent Labs  Lab 12/13/17  0407   WBC 8.60   RBC 2.69*   HGB 8.5*   HCT 23.8*   PLT 70*     CMP:   Recent Labs  Lab 12/13/17  0406   GLU 95   CALCIUM 8.9   ALBUMIN 3.2*   PROT 5.5*   *   K 2.8*   CO2 29   CL 95   BUN 10   CREATININE 0.7   ALKPHOS 137*   ALT 35   AST 41*   BILITOT 24.9*     Coagulation:   Recent Labs  Lab 12/13/17  0406   INR 2.8*       Significant Imaging:  Labs: Reviewed    Assessment/Plan:     * Decompensated hepatic cirrhosis    # Decompensated Cirrhosis due to alcohol, in remission.  # Ascites  # Hepatic encephalopathy  # Esophageal varices - banded during this admission.  # Anemia - multifactorial  # HCC - 1cm liver mass on tpCT on 10/20/17  # Liver transplant candidacy - Completed workup.   # Addiction risk - was doing inpatient rehab. Seen by addiction psych, thought to have improved insight.       Plan:  Approved for listing pending mammogram results.    - transplant coordinators working to obtain OSH films ;  have discussed with them on daily basis  Repeat cultures today given worsening encephalopathy  1.5 L fluid restriction  lasix 40 and aldactone 100 daily  Consult to dietician for nutritional supplement suggestions.  Cont empiric fluconazole  Continue lactulose and rifaximin and zinc  Continue PPI and PRN gi cocktail     General recommendations:  · Daily CBC, CMP and INR for MELD calculation  · Frequent finger stick blood sugar check if active hepatic encepahalopathy  · Avoid narcotics and sedatives  · Low Na diet              Thank you for your consult. I will follow-up with patient. Please contact us if you have any additional questions.    Stalin Powers MD  Hepatology  [facility]

## 2017-12-13 NOTE — ASSESSMENT & PLAN NOTE
Presented at Liver selection committee on 12/06/17, approved pending review of previous mammograms

## 2017-12-13 NOTE — SUBJECTIVE & OBJECTIVE
Interval History:     More confused this am and more somnolent.    Current Facility-Administered Medications   Medication    furosemide tablet 40 mg    GI cocktail (mylanta 30 mL, lidocaine 2 % viscous 10 mL, dicyclomine 10 mL) 50 mL    lactulose 20 gram/30 mL solution Soln 30 g    midodrine tablet 5 mg    ondansetron disintegrating tablet 4 mg    ondansetron injection 4 mg    pantoprazole EC tablet 40 mg    potassium bicarbonate disintegrating tablet 50 mEq    potassium, sodium phosphates 280-160-250 mg packet 1 packet    predniSONE tablet 5 mg    rifAXIMin tablet 550 mg    simethicone chewable tablet 80 mg    sodium chloride 0.9% flush 3 mL    sodium chloride 0.9% flush 3 mL    sodium chloride 0.9% flush 5 mL    spironolactone tablet 100 mg    zinc sulfate capsule 220 mg       Objective:     Vital Signs (Most Recent):  Temp: 97.9 °F (36.6 °C) (12/13/17 1150)  Pulse: 94 (12/13/17 1150)  Resp: 16 (12/13/17 1150)  BP: 123/70 (12/13/17 1150)  SpO2: (!) 94 % (12/13/17 1150) Vital Signs (24h Range):  Temp:  [97.9 °F (36.6 °C)-98.5 °F (36.9 °C)] 97.9 °F (36.6 °C)  Pulse:  [85-94] 94  Resp:  [16-18] 16  SpO2:  [94 %-100 %] 94 %  BP: (101-123)/(51-70) 123/70     Weight: 69.6 kg (153 lb 7 oz) (12/12/17 0700)  Body mass index is 25.53 kg/m².    Physical Exam   Constitutional: She appears ill. No distress.   Eyes: Scleral icterus is present.   Cardiovascular: Normal rate.  Exam reveals no friction rub.    Pulmonary/Chest: Effort normal. No respiratory distress.   Abdominal: Soft. Bowel sounds are normal. She exhibits distension. There is no tenderness. There is no guarding.   Musculoskeletal: She exhibits edema.   Neurological: She is alert.   More somnolent and slow to respond; + asterixis   Vitals reviewed.      MELD-Na score: 32 at 12/13/2017  4:06 AM  MELD score: 30 at 12/13/2017  4:06 AM  Calculated from:  Serum Creatinine: 0.7 mg/dL (Rounded to 1) at 12/13/2017  4:06 AM  Serum Sodium: 132 mmol/L at  12/13/2017  4:06 AM  Total Bilirubin: 24.9 mg/dL at 12/13/2017  4:06 AM  INR(ratio): 2.8 at 12/13/2017  4:06 AM  Age: 52 years    Significant Labs:  CBC:   Recent Labs  Lab 12/13/17  0407   WBC 8.60   RBC 2.69*   HGB 8.5*   HCT 23.8*   PLT 70*     CMP:   Recent Labs  Lab 12/13/17  0406   GLU 95   CALCIUM 8.9   ALBUMIN 3.2*   PROT 5.5*   *   K 2.8*   CO2 29   CL 95   BUN 10   CREATININE 0.7   ALKPHOS 137*   ALT 35   AST 41*   BILITOT 24.9*     Coagulation:   Recent Labs  Lab 12/13/17  0406   INR 2.8*       Significant Imaging:  Labs: Reviewed

## 2017-12-13 NOTE — PROGRESS NOTES
[facility] Hospital Medicine  Progress Note    Patient Name: Marcie Bazan  MRN: [id number]  Patient Class: IP- Inpatient   Admission Date: 11/23/2017  Length of Stay: 19 days  Expected Discharge Date: 12/18/2017  Attending Physician: Surinder Madrigal MD  Primary Care Provider: Milton Ferrer, [facility] Medicine Team: [facility] [place] [place] HOSP MED A Surinder Madrigal MD  Principal Problem:Decompensated hepatic cirrhosis    Subjective:     HPI:   52 year old female with PMHx of 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 Patient presented to [facility] with generalized weakness. Onset two months ago, progressively worsening. Was admitted to  11/10 for hyponatremia seen on labs in GI office. Discharged on 11/15 and returned on 11/20 with 1 week worsening weakness, malaise, lethargy, and decreased appetite. Denied fever/chills, abdominal pain, bright red or black tarry stools, hematemesis or other abnormal bleeding, confusion or disorientation, changes in urination, light-headedness, headache, changes in vision, dyspnea, chest pain or palpitations. Labs demonstrated Na 116 and MELD 32; patient admitted for hyponatremia. Nephrology consulted, recommended fluid restriction , holding diuretics, and prednisode taper that started 11/23 (decreased from 40mg QD to 20mg Qd.) Patient transferred to [facility] for revaluation of liver disease. Of note, paracentesis performed 11/22, removed 2L, negative for SBP.    Follows with Dr. Souza for cirrhosis. Since early 09/2017, patient's liver failure progressing indicated by worsening clinical signs (abdominal distention, weakness/fatigue) and MELD. In mid-October, patient's PETH demonstrated EtOH use and she was denied candidacy for liver transplant. She was then enrolled in in-patient substance abuse program at [facility]  where she has remained since early November; per patient, her last EtOH beverage was 11/01/2017. Denies history of GI bleed, SBP, renal disease, or other complications of cirrhosis. Liver biopsy 10/24 demonstrated stage 4 cirrhosis. Records reports EGD in August 2016 that showed small esophageal varices with severe gastritis. Colonoscopy 2015 with 2 polyps and internal hemorrhoids Started on prednisone 40mg QD on 10/25; was on spironolactone, has been held since 11/10 for hypoNa; on lactulose.    Hospital Course:  Mental status improving with hepatic encephalopathy treatment. Started empiric ceftriaxone given leukocytosis in immunocompromised state. CMV PCR positive overnight, for which ID was consulted, recommended repeating titers in 1 week. Seen by Psychiatry and  who determined she was moderate risk for Liver transplant. Will proceed with EGD/Cscope and mammogram after FFP. Colonoscopy not performed due to poor prep, EGD revealed large varices which were banded. IR paracentesis ordered after patient started complaining of abdominal pain. Mammogram performed, IR paracentesis after INR Reversal negative for SBP. Patient accepted for Liver transplant pending review of previous mammograms.       Interval History: patient seen and examined at bedside, no acute events overnight. Awaiting previous mammograms prior to listing.     Review of Systems   Constitutional: Negative for chills and fever.   HENT: Negative for rhinorrhea and sore throat.    Eyes: Negative for pain and discharge.   Respiratory: Negative for cough and shortness of breath.    Cardiovascular: Negative for chest pain and leg swelling.   Gastrointestinal: Positive for abdominal distention. Negative for abdominal pain, blood in stool, diarrhea, nausea and vomiting.   Endocrine: Negative for cold intolerance and heat intolerance.   Genitourinary: Negative for dysuria and flank pain.   Neurological: Negative for dizziness and numbness.    Psychiatric/Behavioral: Negative for behavioral problems and confusion.     Objective:     Vital Signs (Most Recent):  Temp: 98.3 °F (36.8 °C) (12/12/17 1956)  Pulse: 85 (12/12/17 1956)  Resp: 17 (12/12/17 1956)  BP: (!) 105/54 (12/12/17 1956)  SpO2: 100 % (12/12/17 1956) Vital Signs (24h Range):  Temp:  [97 °F (36.1 °C)-98.7 °F (37.1 °C)] 98.3 °F (36.8 °C)  Pulse:  [] 85  Resp:  [16-18] 17  SpO2:  [96 %-100 %] 100 %  BP: ()/(51-65) 105/54     Weight: 69.6 kg (153 lb 7 oz)  Body mass index is 25.53 kg/m².    Intake/Output Summary (Last 24 hours) at 12/12/17 2033  Last data filed at 12/12/17 1632   Gross per 24 hour   Intake           672.42 ml   Output             5200 ml   Net         -4527.58 ml      Physical Exam   Constitutional: She appears well-developed and well-nourished.   HENT:   Head: Normocephalic and atraumatic.   Eyes: Conjunctivae are normal. Pupils are equal, round, and reactive to light. Scleral icterus is present.   Neck: Normal range of motion. No thyromegaly present.   Cardiovascular: Normal rate, regular rhythm and normal heart sounds.    Pulmonary/Chest: Effort normal and breath sounds normal.   Abdominal: Soft. She exhibits distension. There is no tenderness.   Musculoskeletal: Normal range of motion. She exhibits no edema.   Neurological: She is alert.   AAO times 2   Skin: No erythema. No pallor.       MELD-Na score: 32 at 12/12/2017  6:10 PM  MELD score: 31 at 12/12/2017  6:10 PM  Calculated from:  Serum Creatinine: 0.8 mg/dL (Rounded to 1) at 12/12/2017  6:10 PM  Serum Sodium: 132 mmol/L at 12/12/2017  6:10 PM  Total Bilirubin: 25.9 mg/dL at 12/12/2017  6:10 PM  INR(ratio): 3.1 at 12/12/2017  4:46 AM  Age: 52 years    Significant Labs:  CBC:    Recent Labs  Lab 12/11/17  0443 12/12/17  0446   WBC 9.32 7.95   HGB 8.7* 8.4*   HCT 24.7* 23.7*   PLT 72* 69*     CMP:    Recent Labs  Lab 12/11/17  1550 12/12/17  0446 12/12/17  1810   * 132* 132*   K 3.9 3.2* 3.0*   CL 94* 95  93*   CO2 27 26 28   * 83 105   BUN 11 10 10   CREATININE 0.8 0.7 0.8   CALCIUM 9.4 9.3 9.4   PROT 6.1 5.8* 6.2   ALBUMIN 3.3* 3.1* 3.8   BILITOT 29.8* 27.5* 25.9*   ALKPHOS 159* 144* 128   AST 60* 55* 45*   ALT 52* 49* 41   ANIONGAP 9 11 11   EGFRNONAA >60.0 >60.0 >60.0     PTINR:    Recent Labs  Lab 12/11/17  0443 12/12/17  0446   INR 3.1* 3.1*       Significant Procedures:   Dobutamine Stress Test with Color Flow:   Results for orders placed or performed during the hospital encounter of 10/19/17   Dobutamine Stress Test w/ Color Flow   Result Value Ref Range    EF 73 55 - 65    Diastolic Dysfunction No     Est. PA Systolic Pressure 21.32     Tricuspid Valve Regurgitation TRIVIAL             Assessment / Plan:     * Decompensated hepatic cirrhosis    MELD-Na score: 32 at 12/12/2017  6:10 PM  MELD score: 31 at 12/12/2017  6:10 PM  Calculated from:  Serum Creatinine: 0.8 mg/dL (Rounded to 1) at 12/12/2017  6:10 PM  Serum Sodium: 132 mmol/L at 12/12/2017  6:10 PM  Total Bilirubin: 25.9 mg/dL at 12/12/2017  6:10 PM  INR(ratio): 3.1 at 12/12/2017  4:46 AM  Age: 52 years   - had half of her evaluation completed on last admission  - AS per Psychiatry patient is moderate risk, will proceed with Liver transplant evaluation  - EGD performed, esophageal varices banded,   - Mammogram performed on 12/4/17   - C-scope repeated on 12/5- good prep  - accepted for liver transplant pending radiology review of old and new mammogram         Transplant donor evaluation    Presented at Liver selection committee on 12/06/17, approved pending review of previous mammograms           Immunosuppressed status    On chronic prednisone for alcoholic hepatitis; currently 15 mg, would go down by 5 weekly, next change would be 10 mg this saturday          Coagulopathy    INR 3.1          Alcoholic cirrhosis of liver with ascites    - see decompensated cirrhosis   - IR paracentesis on 12/12 removed 5 liters         Cytomegalovirus (CMV)  viremia    -- ID evaluated and do not feel it needs to be treated        Thrombocytopenia    -- monitor           Abnormal liver enzymes    -- Stable  -- Etiology liver failure; see that section for more details          Alcoholic hepatitis with ascites- decompensated with elevated MELD    - cont tapering prednisone, currently at 5 mg daily         Hyponatremia    - on 800 cc fluid restriction; on albumin  - 116-->118-->120-->122-->123-->126-->125-->127-->131-->133        End stage liver disease              HCC (hepatocellular carcinoma)    AFP 9; seen on CT on 10/19, 1 cm focus, monitor for now          Hepatic encephalopathy    -- encephalopathic today; zinc added to lactulose and rifaximin; had 2 BMs yesterday; 5 charted today; will need atleast two more          Severe protein-calorie malnutrition    PAB, boost and dietary consult          Ascites    -- Holding diuretics at present, s/p 2L removed on 11/22 with testing negative for SBP  -- No abdominal pain today    - IR paracentesis on 12/12/17 removed 5.2 liters               Essential hypertension    - Stable  - Holding home lisinopril in setting of decompensated liver disease          Anemia of chronic disease    - monitor daily                VTE Risk Mitigation         Ordered     Place sequential compression device  Until discontinued      11/24/17 0734     Place FABIOLA hose  Until discontinued      11/23/17 2305     Medium Risk of VTE  Once      11/23/17 2305           Discharge Disposition: Pending listing for Liver transplant     Time taken to evaluate, plan, and coordinate patient care: 35 minutes.    Surinder Madrigal MD  Department of Hospital Medicine  [facility]

## 2017-12-13 NOTE — PLAN OF CARE
Problem: Pressure Ulcer Risk (Clinton Scale) (Adult,Obstetrics,Pediatric)  Intervention: Prevent/Minimize Sheer/Friction Injuries  Pt had uneventful night. No acute changes this shift. Seemed less confused but did have delayed responses several times. Family remains at bedside. Refused lactulose secondary to frequent stooling. Bed in low, locked position. Call light in reach. Will continue to monitor.

## 2017-12-13 NOTE — ASSESSMENT & PLAN NOTE
-- Holding diuretics at present, s/p 2L removed on 11/22 with testing negative for SBP  -- No abdominal pain today    - IR paracentesis on 12/12/17 removed 5.2 liters

## 2017-12-13 NOTE — ASSESSMENT & PLAN NOTE
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  # Ascites  # Hepatic encephalopathy  # Esophageal varices - banded during this admission.  # Anemia - multifactorial  # HCC - 1cm liver mass on tpCT on 10/20/17  # Liver transplant candidacy - Completed workup.   # Addiction risk - was doing inpatient rehab. Seen by addiction psych, thought to have improved insight.       Plan:  Approved for listing pending mammogram results.    - transplant coordinators working to obtain OSH films ; have discussed with them on daily basis  Repeat cultures today given worsening encephalopathy  1.5 L fluid restriction  lasix 40 and aldactone 100 daily  Consult to dietician for nutritional supplement suggestions.  Cont empiric fluconazole  Continue lactulose and rifaximin and zinc  Continue PPI and PRN gi cocktail     General recommendations:  · Daily CBC, CMP and INR for MELD calculation  · Frequent finger stick blood sugar check if active hepatic encepahalopathy  · Avoid narcotics and sedatives  · Low Na diet

## 2017-12-13 NOTE — ASSESSMENT & PLAN NOTE
MELD-Na score: 32 at 12/12/2017  6:10 PM  MELD score: 31 at 12/12/2017  6:10 PM  Calculated from:  Serum Creatinine: 0.8 mg/dL (Rounded to 1) at 12/12/2017  6:10 PM  Serum Sodium: 132 mmol/L at 12/12/2017  6:10 PM  Total Bilirubin: 25.9 mg/dL at 12/12/2017  6:10 PM  INR(ratio): 3.1 at 12/12/2017  4:46 AM  Age: 52 years   - had half of her evaluation completed on last admission  - AS per Psychiatry patient is moderate risk, will proceed with Liver transplant evaluation  - EGD performed, esophageal varices banded,   - Mammogram performed on 12/4/17   - C-scope repeated on 12/5- good prep  - accepted for liver transplant pending radiology review of old and new mammogram

## 2017-12-14 LAB
ALBUMIN SERPL BCP-MCNC: 3.1 G/DL
ALBUMIN SERPL BCP-MCNC: 3.3 G/DL
ALP SERPL-CCNC: 167 U/L
ALP SERPL-CCNC: 190 U/L
ALT SERPL W/O P-5'-P-CCNC: 41 U/L
ALT SERPL W/O P-5'-P-CCNC: 43 U/L
ANION GAP SERPL CALC-SCNC: 11 MMOL/L
ANION GAP SERPL CALC-SCNC: 11 MMOL/L
AST SERPL-CCNC: 50 U/L
AST SERPL-CCNC: 52 U/L
BASOPHILS # BLD AUTO: 0.04 K/UL
BASOPHILS NFR BLD: 0.4 %
BILIRUB SERPL-MCNC: 27.4 MG/DL
BILIRUB SERPL-MCNC: 28.8 MG/DL
BUN SERPL-MCNC: 10 MG/DL
BUN SERPL-MCNC: 10 MG/DL
CALCIUM SERPL-MCNC: 9.1 MG/DL
CALCIUM SERPL-MCNC: 9.5 MG/DL
CHLORIDE SERPL-SCNC: 94 MMOL/L
CHLORIDE SERPL-SCNC: 98 MMOL/L
CO2 SERPL-SCNC: 26 MMOL/L
CO2 SERPL-SCNC: 27 MMOL/L
CREAT SERPL-MCNC: 0.8 MG/DL
CREAT SERPL-MCNC: 0.8 MG/DL
DIFFERENTIAL METHOD: ABNORMAL
EOSINOPHIL # BLD AUTO: 0 K/UL
EOSINOPHIL NFR BLD: 0.4 %
ERYTHROCYTE [DISTWIDTH] IN BLOOD BY AUTOMATED COUNT: 21.3 %
EST. GFR  (AFRICAN AMERICAN): >60 ML/MIN/1.73 M^2
EST. GFR  (AFRICAN AMERICAN): >60 ML/MIN/1.73 M^2
EST. GFR  (NON AFRICAN AMERICAN): >60 ML/MIN/1.73 M^2
EST. GFR  (NON AFRICAN AMERICAN): >60 ML/MIN/1.73 M^2
GLUCOSE SERPL-MCNC: 107 MG/DL
GLUCOSE SERPL-MCNC: 86 MG/DL
HCT VFR BLD AUTO: 25.9 %
HGB BLD-MCNC: 9.2 G/DL
IMM GRANULOCYTES # BLD AUTO: 0.04 K/UL
IMM GRANULOCYTES NFR BLD AUTO: 0.4 %
INR PPP: 3
LYMPHOCYTES # BLD AUTO: 4.3 K/UL
LYMPHOCYTES NFR BLD: 46.2 %
MAGNESIUM SERPL-MCNC: 1.6 MG/DL
MCH RBC QN AUTO: 31.8 PG
MCHC RBC AUTO-ENTMCNC: 35.5 G/DL
MCV RBC AUTO: 90 FL
MONOCYTES # BLD AUTO: 1.2 K/UL
MONOCYTES NFR BLD: 12.5 %
NEUTROPHILS # BLD AUTO: 3.7 K/UL
NEUTROPHILS NFR BLD: 40.1 %
NRBC BLD-RTO: 0 /100 WBC
PHOSPHATE SERPL-MCNC: 2.7 MG/DL
PLATELET # BLD AUTO: 67 K/UL
PMV BLD AUTO: 10.8 FL
POTASSIUM SERPL-SCNC: 3.3 MMOL/L
POTASSIUM SERPL-SCNC: 4.1 MMOL/L
PROT SERPL-MCNC: 5.8 G/DL
PROT SERPL-MCNC: 6.3 G/DL
PROTHROMBIN TIME: 30.2 SEC
RBC # BLD AUTO: 2.89 M/UL
SODIUM SERPL-SCNC: 132 MMOL/L
SODIUM SERPL-SCNC: 135 MMOL/L
WBC # BLD AUTO: 9.22 K/UL

## 2017-12-14 PROCEDURE — 85610 PROTHROMBIN TIME: CPT

## 2017-12-14 PROCEDURE — 99232 SBSQ HOSP IP/OBS MODERATE 35: CPT | Mod: ,,, | Performed by: INTERNAL MEDICINE

## 2017-12-14 PROCEDURE — 36415 COLL VENOUS BLD VENIPUNCTURE: CPT

## 2017-12-14 PROCEDURE — 85025 COMPLETE CBC W/AUTO DIFF WBC: CPT

## 2017-12-14 PROCEDURE — 80053 COMPREHEN METABOLIC PANEL: CPT

## 2017-12-14 PROCEDURE — 63600175 PHARM REV CODE 636 W HCPCS: Performed by: HOSPITALIST

## 2017-12-14 PROCEDURE — 97116 GAIT TRAINING THERAPY: CPT

## 2017-12-14 PROCEDURE — 99233 SBSQ HOSP IP/OBS HIGH 50: CPT | Mod: ,,, | Performed by: HOSPITALIST

## 2017-12-14 PROCEDURE — 20600001 HC STEP DOWN PRIVATE ROOM

## 2017-12-14 PROCEDURE — 83735 ASSAY OF MAGNESIUM: CPT

## 2017-12-14 PROCEDURE — 25000003 PHARM REV CODE 250: Performed by: STUDENT IN AN ORGANIZED HEALTH CARE EDUCATION/TRAINING PROGRAM

## 2017-12-14 PROCEDURE — 84100 ASSAY OF PHOSPHORUS: CPT

## 2017-12-14 PROCEDURE — 25000003 PHARM REV CODE 250: Performed by: HOSPITALIST

## 2017-12-14 PROCEDURE — 97110 THERAPEUTIC EXERCISES: CPT

## 2017-12-14 RX ADMIN — PANTOPRAZOLE SODIUM 40 MG: 40 TABLET, DELAYED RELEASE ORAL at 08:12

## 2017-12-14 RX ADMIN — MIDODRINE HYDROCHLORIDE 5 MG: 5 TABLET ORAL at 08:12

## 2017-12-14 RX ADMIN — PREDNISONE 5 MG: 5 TABLET ORAL at 08:12

## 2017-12-14 RX ADMIN — MIDODRINE HYDROCHLORIDE 5 MG: 5 TABLET ORAL at 09:12

## 2017-12-14 RX ADMIN — RIFAXIMIN 550 MG: 550 TABLET ORAL at 09:12

## 2017-12-14 RX ADMIN — MIDODRINE HYDROCHLORIDE 5 MG: 5 TABLET ORAL at 02:12

## 2017-12-14 RX ADMIN — LACTULOSE 30 G: 20 SOLUTION ORAL at 06:12

## 2017-12-14 RX ADMIN — SPIRONOLACTONE 100 MG: 100 TABLET ORAL at 08:12

## 2017-12-14 RX ADMIN — POTASSIUM & SODIUM PHOSPHATES POWDER PACK 280-160-250 MG 1 PACKET: 280-160-250 PACK at 08:12

## 2017-12-14 RX ADMIN — Medication 220 MG: at 08:12

## 2017-12-14 RX ADMIN — POTASSIUM BICARBONATE 50 MEQ: 25 TABLET, EFFERVESCENT ORAL at 08:12

## 2017-12-14 RX ADMIN — POTASSIUM BICARBONATE 50 MEQ: 25 TABLET, EFFERVESCENT ORAL at 02:12

## 2017-12-14 RX ADMIN — RIFAXIMIN 550 MG: 550 TABLET ORAL at 08:12

## 2017-12-14 RX ADMIN — LACTULOSE 30 G: 20 SOLUTION ORAL at 02:12

## 2017-12-14 RX ADMIN — FUROSEMIDE 40 MG: 40 TABLET ORAL at 08:12

## 2017-12-14 RX ADMIN — LACTULOSE 30 G: 20 SOLUTION ORAL at 09:12

## 2017-12-14 NOTE — SUBJECTIVE & OBJECTIVE
Interval History:     More lucid this am. Having more BMs overnight.  Awaiting OSH mammogram films, will arrive today.    Current Facility-Administered Medications   Medication    furosemide tablet 40 mg    GI cocktail (mylanta 30 mL, lidocaine 2 % viscous 10 mL, dicyclomine 10 mL) 50 mL    lactulose 10 gram/15 mL solution (enema) 200 g    lactulose 20 gram/30 mL solution Soln 30 g    midodrine tablet 5 mg    ondansetron disintegrating tablet 4 mg    ondansetron injection 4 mg    pantoprazole EC tablet 40 mg    potassium bicarbonate disintegrating tablet 50 mEq    potassium bicarbonate disintegrating tablet 50 mEq    predniSONE tablet 5 mg    rifAXIMin tablet 550 mg    simethicone chewable tablet 80 mg    sodium chloride 0.9% flush 3 mL    sodium chloride 0.9% flush 3 mL    sodium chloride 0.9% flush 5 mL    spironolactone tablet 100 mg    zinc sulfate capsule 220 mg       Objective:     Vital Signs (Most Recent):  Temp: 98.1 °F (36.7 °C) (12/14/17 1127)  Pulse: 94 (12/14/17 1127)  Resp: 17 (12/14/17 1127)  BP: (!) 110/58 (12/14/17 1127)  SpO2: 98 % (12/14/17 1127) Vital Signs (24h Range):  Temp:  [97.9 °F (36.6 °C)-98.9 °F (37.2 °C)] 98.1 °F (36.7 °C)  Pulse:  [90-97] 94  Resp:  [14-17] 17  SpO2:  [98 %-100 %] 98 %  BP: (101-116)/(52-60) 110/58     Weight: 69.6 kg (153 lb 7 oz) (12/12/17 0700)  Body mass index is 25.53 kg/m².    Physical Exam   Constitutional: She appears ill. No distress.   Eyes: Scleral icterus is present.   Cardiovascular: Normal rate.  Exam reveals no friction rub.    Pulmonary/Chest: Effort normal. No respiratory distress.   Abdominal: Soft. Bowel sounds are normal. She exhibits distension. There is no tenderness. There is no guarding.   Musculoskeletal: She exhibits edema.   Neurological: She is alert.   More lucid and alert today   Vitals reviewed.      MELD-Na score: 32 at 12/14/2017  4:38 AM  MELD score: 31 at 12/14/2017  4:38 AM  Calculated from:  Serum Creatinine: 0.8  mg/dL (Rounded to 1) at 12/14/2017  4:38 AM  Serum Sodium: 135 mmol/L at 12/14/2017  4:38 AM  Total Bilirubin: 27.4 mg/dL at 12/14/2017  4:38 AM  INR(ratio): 3.0 at 12/14/2017  4:38 AM  Age: 52 years    Significant Labs:  CBC:   Recent Labs  Lab 12/14/17 0438   WBC 9.22   RBC 2.89*   HGB 9.2*   HCT 25.9*   PLT 67*     CMP:   Recent Labs  Lab 12/14/17 0438   GLU 86   CALCIUM 9.1   ALBUMIN 3.1*   PROT 5.8*   *   K 3.3*   CO2 26   CL 98   BUN 10   CREATININE 0.8   ALKPHOS 167*   ALT 41   AST 50*   BILITOT 27.4*     Coagulation:   Recent Labs  Lab 12/14/17 0438   INR 3.0*       Significant Imaging:  Labs: Reviewed

## 2017-12-14 NOTE — PROGRESS NOTES
[facility] Hospital Medicine  Progress Note    Patient Name: Marcie Bazan  MRN: [id number]  Patient Class: IP- Inpatient   Admission Date: 11/23/2017  Length of Stay: 21 days  Expected Discharge Date: 12/22/2017  Attending Physician: Surinder Madrigal MD  Primary Care Provider: Milton Ferrer, [facility] Medicine Team: [facility] A Surinder Madrigal MD  Principal Problem:Decompensated hepatic cirrhosis    Subjective:     HPI:   52 year old female with PMHx of 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 Patient presented to [facility] with generalized weakness. Onset two months ago, progressively worsening. Was admitted to  11/10 for hyponatremia seen on labs in GI office. Discharged on 11/15 and returned on 11/20 with 1 week worsening weakness, malaise, lethargy, and decreased appetite. Denied fever/chills, abdominal pain, bright red or black tarry stools, hematemesis or other abnormal bleeding, confusion or disorientation, changes in urination, light-headedness, headache, changes in vision, dyspnea, chest pain or palpitations. Labs demonstrated Na 116 and MELD 32; patient admitted for hyponatremia. Nephrology consulted, recommended fluid restriction , holding diuretics, and prednisode taper that started 11/23 (decreased from 40mg QD to 20mg Qd.) Patient transferred to [facility] for revaluation of liver disease. Of note, paracentesis performed 11/22, removed 2L, negative for SBP.    Follows with Dr. Souza for cirrhosis. Since early 09/2017, patient's liver failure progressing indicated by worsening clinical signs (abdominal distention, weakness/fatigue) and MELD. In mid-October, patient's PETH demonstrated EtOH use and she was denied candidacy for liver transplant. She was then enrolled in in-patient substance abuse program at [facility]  where she has remained since early November; per patient, her last EtOH beverage was 11/01/2017. Denies history of GI bleed, SBP, renal disease, or other complications of cirrhosis. Liver biopsy 10/24 demonstrated stage 4 cirrhosis. Records reports EGD in August 2016 that showed small esophageal varices with severe gastritis. Colonoscopy 2015 with 2 polyps and internal hemorrhoids Started on prednisone 40mg QD on 10/25; was on spironolactone, has been held since 11/10 for hypoNa; on lactulose.    Hospital Course:  Mental status improving with hepatic encephalopathy treatment. Started empiric ceftriaxone given leukocytosis in immunocompromised state. CMV PCR positive overnight, for which ID was consulted, recommended repeating titers in 1 week. Seen by Psychiatry and  who determined she was moderate risk for Liver transplant. Will proceed with EGD/Cscope and mammogram after FFP. Colonoscopy not performed due to poor prep, EGD revealed large varices which were banded. IR paracentesis ordered after patient started complaining of abdominal pain. Mammogram performed, IR paracentesis after INR Reversal negative for SBP. Patient accepted for Liver transplant pending review of previous mammograms.  Patient was slightly confused and altered, which resolved with lactulose enemas.       Interval History: Patient seen and examined at bedside, no acute events overnight. Mentation much improved after lactulose enemas. Still awaiting mammogram in order to list patient for Liver transplant.     Review of Systems   Constitutional: Negative for chills and fever.   HENT: Negative for rhinorrhea and sore throat.    Eyes: Negative for pain and discharge.   Respiratory: Negative for cough and shortness of breath.    Cardiovascular: Negative for chest pain and leg swelling.   Gastrointestinal: Positive for abdominal distention. Negative for abdominal pain, blood in stool, diarrhea, nausea and vomiting.   Endocrine: Negative  for cold intolerance and heat intolerance.   Genitourinary: Negative for dysuria and flank pain.   Neurological: Negative for dizziness and numbness.   Psychiatric/Behavioral: Negative for behavioral problems and confusion.     Objective:     Vital Signs (Most Recent):  Temp: 98.1 °F (36.7 °C) (12/14/17 1127)  Pulse: 94 (12/14/17 1127)  Resp: 17 (12/14/17 1127)  BP: (!) 110/58 (12/14/17 1127)  SpO2: 98 % (12/14/17 1127) Vital Signs (24h Range):  Temp:  [97.9 °F (36.6 °C)-98.9 °F (37.2 °C)] 98.1 °F (36.7 °C)  Pulse:  [90-97] 94  Resp:  [14-17] 17  SpO2:  [98 %-100 %] 98 %  BP: (101-116)/(52-60) 110/58     Weight: 69.6 kg (153 lb 7 oz)  Body mass index is 25.53 kg/m².    Intake/Output Summary (Last 24 hours) at 12/14/17 1343  Last data filed at 12/13/17 1745   Gross per 24 hour   Intake              150 ml   Output                0 ml   Net              150 ml      Physical Exam   Constitutional: She appears well-developed and well-nourished.   HENT:   Head: Normocephalic and atraumatic.   Eyes: Conjunctivae are normal. Pupils are equal, round, and reactive to light. Scleral icterus is present.   Neck: Normal range of motion. No thyromegaly present.   Cardiovascular: Normal rate, regular rhythm and normal heart sounds.    Pulmonary/Chest: Effort normal and breath sounds normal.   Abdominal: Soft. She exhibits distension. There is no tenderness.   Musculoskeletal: Normal range of motion. She exhibits no edema.   Neurological: She is alert.   AAO times 2   Skin: No erythema. No pallor.       MELD-Na score: 32 at 12/14/2017  4:38 AM  MELD score: 31 at 12/14/2017  4:38 AM  Calculated from:  Serum Creatinine: 0.8 mg/dL (Rounded to 1) at 12/14/2017  4:38 AM  Serum Sodium: 135 mmol/L at 12/14/2017  4:38 AM  Total Bilirubin: 27.4 mg/dL at 12/14/2017  4:38 AM  INR(ratio): 3.0 at 12/14/2017  4:38 AM  Age: 52 years    Significant Labs:  CBC:    Recent Labs  Lab 12/13/17  0407 12/14/17  0438   WBC 8.60 9.22   HGB 8.5* 9.2*   HCT  23.8* 25.9*   PLT 70* 67*     CMP:    Recent Labs  Lab 12/13/17  0406 12/13/17  1545 12/14/17  0438   * 136 135*   K 2.8* 3.5 3.3*   CL 95 96 98   CO2 29 28 26   GLU 95 103 86   BUN 10 10 10   CREATININE 0.7 0.8 0.8   CALCIUM 8.9 9.5 9.1   PROT 5.5* 6.0 5.8*   ALBUMIN 3.2* 3.3* 3.1*   BILITOT 24.9* 28.7* 27.4*   ALKPHOS 137* 163* 167*   AST 41* 49* 50*   ALT 35 41 41   ANIONGAP 8 12 11   EGFRNONAA >60.0 >60.0 >60.0     PTINR:    Recent Labs  Lab 12/13/17  0406 12/14/17  0438   INR 2.8* 3.0*       Significant Procedures:   Dobutamine Stress Test with Color Flow: Results for orders placed or performed during the hospital encounter of 10/19/17   Dobutamine Stress Test w/ Color Flow   Result Value Ref Range    EF 73 55 - 65    Diastolic Dysfunction No     Est. PA Systolic Pressure 21.32     Tricuspid Valve Regurgitation TRIVIAL             Assessment / Plan:     * Decompensated hepatic cirrhosis    MELD-Na score: 32 at 12/14/2017  4:38 AM  MELD score: 31 at 12/14/2017  4:38 AM  Calculated from:  Serum Creatinine: 0.8 mg/dL (Rounded to 1) at 12/14/2017  4:38 AM  Serum Sodium: 135 mmol/L at 12/14/2017  4:38 AM  Total Bilirubin: 27.4 mg/dL at 12/14/2017  4:38 AM  INR(ratio): 3.0 at 12/14/2017  4:38 AM  Age: 52 years   - had half of her evaluation completed on last admission  - AS per Psychiatry patient is moderate risk, will proceed with Liver transplant evaluation  - EGD performed, esophageal varices banded,   - Mammogram performed on 12/4/17   - C-scope repeated on 12/5- good prep  - accepted for liver transplant pending radiology review of old and new mammogram         Transplant donor evaluation    Presented at Liver selection committee on 12/06/17, approved pending review of previous mammograms           Immunosuppressed status    On chronic prednisone for alcoholic hepatitis; currently 15 mg, would go down by 5 weekly, next change would be 10 mg this saturday          Coagulopathy    INR 3.0          Alcoholic  cirrhosis of liver with ascites    - see decompensated cirrhosis   - IR paracentesis on 12/12 removed 5 liters         Cytomegalovirus (CMV) viremia    -- ID evaluated and do not feel it needs to be treated        Thrombocytopenia    -- monitor           Abnormal liver enzymes    -- Stable  -- Etiology liver failure; see that section for more details          Alcoholic hepatitis with ascites- decompensated with elevated MELD    - cont tapering prednisone, currently at 5 mg daily         Hyponatremia    - on 800 cc fluid restriction; on albumin  - 116-->118-->120-->122-->123-->126-->125-->127-->131-->133-->136-->135        End stage liver disease              HCC (hepatocellular carcinoma)    AFP 9; seen on CT on 10/19, 1 cm focus, monitor for now          Hepatic encephalopathy    -- mental status improved considerably after lactulose enemas         Severe protein-calorie malnutrition    PAB, boost and dietary consult          Ascites    -- Holding diuretics at present, s/p 2L removed on 11/22 with testing negative for SBP  -- No abdominal pain today    - IR paracentesis on 12/12/17 removed 5.2 liters               Essential hypertension    - Stable  - Holding home lisinopril in setting of decompensated liver disease          Anemia of chronic disease    - monitor daily                VTE Risk Mitigation         Ordered     Place sequential compression device  Until discontinued      11/24/17 0734     Place FABIOLA hose  Until discontinued      11/23/17 2305     Medium Risk of VTE  Once      11/23/17 2305           Discharge Disposition: pending Liver transplant     Time taken to evaluate, plan, and coordinate patient care: 35 minutes.    Surinder Madrigal MD  Department of Hospital Medicine  [facility]

## 2017-12-14 NOTE — PLAN OF CARE
Problem: Physical Therapy Goal  Goal: Physical Therapy Goal  Goals to be met by: 17    Patient will increase functional independence with mobility by performin. Supine to sit with Set-up [facility] MET  2. Sit to supine with Set-up [facility] MET  3. Sit to stand transfer with Supervision MET  4. Bed to chair transfer with Supervision using Rolling Walker MET  5. Gait  x 100 feet with Stand-by Assistance using Rolling Walker. MET  Revised: Gait x 300 ft with rollator usage and (S)   6. Lower extremity exercise program x20 reps per handout, with independence         Goals remain appropriate at time. Continue with PT POC as indicated.

## 2017-12-14 NOTE — PROGRESS NOTES
[facility]  Hepatology  Progress Note    Patient Name: Marcie Bazan  MRN: [id number]  Admission Date: 11/23/2017  Hospital Length of Stay: 21 days  Attending Provider: Surinder Madrigal MD   Primary Care Physician: ODILIA Wall  Principal Problem:Decompensated hepatic cirrhosis    Subjective:     Transplant status: Pre-transplant    HPI: 52 year old female with PMHx of ESLD 2/2 EtOH hepatitis and essential HTN who presents to [facility] as a direct transfer from [facility] for evaluation of decompensated liver cirrhosis. Patient presented to [facility] with generalized weakness and was admitted to  11/10 for hyponatremia seen on labs in GI office. Discharged on 11/15 and returned on 11/20 with 1 week worsening weakness, malaise, lethargy, and decreased appetite. Denied fever/chills, abdominal pain, bright red or black tarry stools, hematemesis or other abnormal bleeding, confusion or disorientation, changes in urination, light-headedness, headache, changes in vision, dyspnea, chest pain or palpitations. Labs demonstrated Na 116 and MELD 32; patient admitted for hyponatremia. Nephrology consulted, recommended fluid restriction , holding diuretics, and prednisode taper that started 11/23 (decreased from 40mg QD to 20mg Qd.) Patient transferred to [facility] for revaluation of liver disease. Of note, paracentesis performed 11/22, removed 2L, negative for SBP.     Follows with Dr. Souza for cirrhosis. Since early 09/2017, patient's liver failure progressing indicated by worsening clinical signs (abdominal distention, weakness/fatigue) and MELD. She had been evaluated by [facility] 10/25/2017 and declined transplant candidacy 2/2 continued EtOH use and lack of solid caregiver support/plan.She has since been enrolled in in-patient substance abuse program at [facility] where she has remained since early November; per patient, her last EtOH beverage  "was 11/01/2017.  Per chart review on 11/22/17:     "Patient's case was discussed in liver selection committee today. Per committee discussion, the SW will call patient's daughter to discuss caregiver plan at this time. If the caregiver plan is adequate, patient will need to follow up with either/both addiction psych or SW regarding substance abuse and plan. May need to do this inpatient if patient is still admitted."        Denies history of GI bleed, SBP, renal disease, or other complications of cirrhosis. Liver biopsy 10/24 demonstrated stage 4 cirrhosis. Records reports EGD in August 2016 that showed small esophageal varices with severe gastritis. Colonoscopy 2015 with 2 polyps and internal hemorrhoids Started on prednisone 40mg QD on 10/25; was on spironolactone, has been held since 11/10 for hypoNa; on lactulose.    Interval History:     More lucid this am. Having more BMs overnight.  Awaiting OSH mammogram films, will arrive today.    Current Facility-Administered Medications   Medication    furosemide tablet 40 mg    GI cocktail (mylanta 30 mL, lidocaine 2 % viscous 10 mL, dicyclomine 10 mL) 50 mL    lactulose 10 gram/15 mL solution (enema) 200 g    lactulose 20 gram/30 mL solution Soln 30 g    midodrine tablet 5 mg    ondansetron disintegrating tablet 4 mg    ondansetron injection 4 mg    pantoprazole EC tablet 40 mg    potassium bicarbonate disintegrating tablet 50 mEq    potassium bicarbonate disintegrating tablet 50 mEq    predniSONE tablet 5 mg    rifAXIMin tablet 550 mg    simethicone chewable tablet 80 mg    sodium chloride 0.9% flush 3 mL    sodium chloride 0.9% flush 3 mL    sodium chloride 0.9% flush 5 mL    spironolactone tablet 100 mg    zinc sulfate capsule 220 mg       Objective:     Vital Signs (Most Recent):  Temp: 98.1 °F (36.7 °C) (12/14/17 1127)  Pulse: 94 (12/14/17 1127)  Resp: 17 (12/14/17 1127)  BP: (!) 110/58 (12/14/17 1127)  SpO2: 98 % (12/14/17 1127) Vital Signs (24h " Range):  Temp:  [97.9 °F (36.6 °C)-98.9 °F (37.2 °C)] 98.1 °F (36.7 °C)  Pulse:  [90-97] 94  Resp:  [14-17] 17  SpO2:  [98 %-100 %] 98 %  BP: (101-116)/(52-60) 110/58     Weight: 69.6 kg (153 lb 7 oz) (12/12/17 0700)  Body mass index is 25.53 kg/m².    Physical Exam   Constitutional: She appears ill. No distress.   Eyes: Scleral icterus is present.   Cardiovascular: Normal rate.  Exam reveals no friction rub.    Pulmonary/Chest: Effort normal. No respiratory distress.   Abdominal: Soft. Bowel sounds are normal. She exhibits distension. There is no tenderness. There is no guarding.   Musculoskeletal: She exhibits edema.   Neurological: She is alert.   More lucid and alert today   Vitals reviewed.      MELD-Na score: 32 at 12/14/2017  4:38 AM  MELD score: 31 at 12/14/2017  4:38 AM  Calculated from:  Serum Creatinine: 0.8 mg/dL (Rounded to 1) at 12/14/2017  4:38 AM  Serum Sodium: 135 mmol/L at 12/14/2017  4:38 AM  Total Bilirubin: 27.4 mg/dL at 12/14/2017  4:38 AM  INR(ratio): 3.0 at 12/14/2017  4:38 AM  Age: 52 years    Significant Labs:  CBC:   Recent Labs  Lab 12/14/17 0438   WBC 9.22   RBC 2.89*   HGB 9.2*   HCT 25.9*   PLT 67*     CMP:   Recent Labs  Lab 12/14/17 0438   GLU 86   CALCIUM 9.1   ALBUMIN 3.1*   PROT 5.8*   *   K 3.3*   CO2 26   CL 98   BUN 10   CREATININE 0.8   ALKPHOS 167*   ALT 41   AST 50*   BILITOT 27.4*     Coagulation:   Recent Labs  Lab 12/14/17 0438   INR 3.0*       Significant Imaging:  Labs: Reviewed    Assessment/Plan:     * Decompensated hepatic cirrhosis    # Decompensated Cirrhosis due to alcohol, in remission.  # Ascites  # Hepatic encephalopathy  # Esophageal varices - banded during this admission.  # Anemia - multifactorial  # HCC - 1cm liver mass on tpCT on 10/20/17  # Liver transplant candidacy - Completed workup.   # Addiction risk - was doing inpatient rehab. Seen by addiction psych, thought to have improved insight.       Plan:  Approved for listing pending mammogram  results.    - transplant coordinators will obtain mammogram films today  1.5 L fluid restriction  lasix 40 and aldactone 100 daily  Consult to dietician for nutritional supplement suggestions.  Cont empiric fluconazole  Continue lactulose and rifaximin and zinc  Continue PPI and PRN gi cocktail     General recommendations:  · Daily CBC, CMP and INR for MELD calculation  · Frequent finger stick blood sugar check if active hepatic encepahalopathy  · Avoid narcotics and sedatives  · Low Na diet          Pending OSH mammo results, transfer to LTS soon.    Thank you for your consult. I will follow-up with patient. Please contact us if you have any additional questions.    Stalin Powers MD  Hepatology  [facility]

## 2017-12-14 NOTE — ASSESSMENT & PLAN NOTE
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  # Ascites  # Hepatic encephalopathy  # Esophageal varices - banded during this admission.  # Anemia - multifactorial  # HCC - 1cm liver mass on tpCT on 10/20/17  # Liver transplant candidacy - Completed workup.   # Addiction risk - was doing inpatient rehab. Seen by addiction psych, thought to have improved insight.       Plan:  Approved for listing pending mammogram results.    - transplant coordinators will obtain mammogram films today  1.5 L fluid restriction  lasix 40 and aldactone 100 daily  Consult to dietician for nutritional supplement suggestions.  Cont empiric fluconazole  Continue lactulose and rifaximin and zinc  Continue PPI and PRN gi cocktail     General recommendations:  · Daily CBC, CMP and INR for MELD calculation  · Frequent finger stick blood sugar check if active hepatic encepahalopathy  · Avoid narcotics and sedatives  · Low Na diet

## 2017-12-14 NOTE — SUBJECTIVE & OBJECTIVE
Interval History: Patient seen and examined at bedside, no acute events overnight. Mentation much improved after lactulose enemas. Still awaiting mammogram in order to list patient for Liver transplant.     Review of Systems   Constitutional: Negative for chills and fever.   HENT: Negative for rhinorrhea and sore throat.    Eyes: Negative for pain and discharge.   Respiratory: Negative for cough and shortness of breath.    Cardiovascular: Negative for chest pain and leg swelling.   Gastrointestinal: Positive for abdominal distention. Negative for abdominal pain, blood in stool, diarrhea, nausea and vomiting.   Endocrine: Negative for cold intolerance and heat intolerance.   Genitourinary: Negative for dysuria and flank pain.   Neurological: Negative for dizziness and numbness.   Psychiatric/Behavioral: Negative for behavioral problems and confusion.     Objective:     Vital Signs (Most Recent):  Temp: 98.1 °F (36.7 °C) (12/14/17 1127)  Pulse: 94 (12/14/17 1127)  Resp: 17 (12/14/17 1127)  BP: (!) 110/58 (12/14/17 1127)  SpO2: 98 % (12/14/17 1127) Vital Signs (24h Range):  Temp:  [97.9 °F (36.6 °C)-98.9 °F (37.2 °C)] 98.1 °F (36.7 °C)  Pulse:  [90-97] 94  Resp:  [14-17] 17  SpO2:  [98 %-100 %] 98 %  BP: (101-116)/(52-60) 110/58     Weight: 69.6 kg (153 lb 7 oz)  Body mass index is 25.53 kg/m².    Intake/Output Summary (Last 24 hours) at 12/14/17 1343  Last data filed at 12/13/17 1745   Gross per 24 hour   Intake              150 ml   Output                0 ml   Net              150 ml      Physical Exam   Constitutional: She appears well-developed and well-nourished.   HENT:   Head: Normocephalic and atraumatic.   Eyes: Conjunctivae are normal. Pupils are equal, round, and reactive to light. Scleral icterus is present.   Neck: Normal range of motion. No thyromegaly present.   Cardiovascular: Normal rate, regular rhythm and normal heart sounds.    Pulmonary/Chest: Effort normal and breath sounds normal.   Abdominal:  Soft. She exhibits distension. There is no tenderness.   Musculoskeletal: Normal range of motion. She exhibits no edema.   Neurological: She is alert.   AAO times 2   Skin: No erythema. No pallor.       MELD-Na score: 32 at 12/14/2017  4:38 AM  MELD score: 31 at 12/14/2017  4:38 AM  Calculated from:  Serum Creatinine: 0.8 mg/dL (Rounded to 1) at 12/14/2017  4:38 AM  Serum Sodium: 135 mmol/L at 12/14/2017  4:38 AM  Total Bilirubin: 27.4 mg/dL at 12/14/2017  4:38 AM  INR(ratio): 3.0 at 12/14/2017  4:38 AM  Age: 52 years    Significant Labs:  CBC:    Recent Labs  Lab 12/13/17  0407 12/14/17  0438   WBC 8.60 9.22   HGB 8.5* 9.2*   HCT 23.8* 25.9*   PLT 70* 67*     CMP:    Recent Labs  Lab 12/13/17  0406 12/13/17  1545 12/14/17  0438   * 136 135*   K 2.8* 3.5 3.3*   CL 95 96 98   CO2 29 28 26   GLU 95 103 86   BUN 10 10 10   CREATININE 0.7 0.8 0.8   CALCIUM 8.9 9.5 9.1   PROT 5.5* 6.0 5.8*   ALBUMIN 3.2* 3.3* 3.1*   BILITOT 24.9* 28.7* 27.4*   ALKPHOS 137* 163* 167*   AST 41* 49* 50*   ALT 35 41 41   ANIONGAP 8 12 11   EGFRNONAA >60.0 >60.0 >60.0     PTINR:    Recent Labs  Lab 12/13/17  0406 12/14/17  0438   INR 2.8* 3.0*       Significant Procedures:   Dobutamine Stress Test with Color Flow: Results for orders placed or performed during the hospital encounter of 10/19/17   Dobutamine Stress Test w/ Color Flow   Result Value Ref Range    EF 73 55 - 65    Diastolic Dysfunction No     Est. PA Systolic Pressure 21.32     Tricuspid Valve Regurgitation TRIVIAL

## 2017-12-14 NOTE — PLAN OF CARE
Problem: Patient Care Overview  Goal: Plan of Care Review  Outcome: Ongoing (interventions implemented as appropriate)  Patient had multiple BM's through out the night, started shift she was very slow in movement and speech by morning she appeared to have more strength and purpose, patient was compliant with medications

## 2017-12-14 NOTE — SUBJECTIVE & OBJECTIVE
Interval History: patient seen and examined at bedside, more confused and lethargic today, improved after lactulose enema. Awaiting review of previous mammograms prior to listing.     Review of Systems   Constitutional: Negative for chills and fever.   HENT: Negative for rhinorrhea and sore throat.    Eyes: Negative for pain and discharge.   Respiratory: Negative for cough and shortness of breath.    Cardiovascular: Negative for chest pain and leg swelling.   Gastrointestinal: Positive for abdominal distention. Negative for abdominal pain, blood in stool, diarrhea, nausea and vomiting.   Endocrine: Negative for cold intolerance and heat intolerance.   Genitourinary: Negative for dysuria and flank pain.   Neurological: Negative for dizziness and numbness.   Psychiatric/Behavioral: Negative for behavioral problems and confusion.     Objective:     Vital Signs (Most Recent):  Temp: 98.9 °F (37.2 °C) (12/13/17 1731)  Pulse: 97 (12/13/17 1731)  Resp: 16 (12/13/17 1731)  BP: (!) 113/57 (12/13/17 1731)  SpO2: 98 % (12/13/17 1731) Vital Signs (24h Range):  Temp:  [97.9 °F (36.6 °C)-98.9 °F (37.2 °C)] 98.9 °F (37.2 °C)  Pulse:  [85-97] 97  Resp:  [16-18] 16  SpO2:  [94 %-100 %] 98 %  BP: (101-123)/(51-70) 113/57     Weight: 69.6 kg (153 lb 7 oz)  Body mass index is 25.53 kg/m².    Intake/Output Summary (Last 24 hours) at 12/13/17 1811  Last data filed at 12/13/17 0613   Gross per 24 hour   Intake              240 ml   Output                0 ml   Net              240 ml      Physical Exam   Constitutional: She appears well-developed and well-nourished.   HENT:   Head: Normocephalic and atraumatic.   Eyes: Conjunctivae are normal. Pupils are equal, round, and reactive to light. Scleral icterus is present.   Neck: Normal range of motion. No thyromegaly present.   Cardiovascular: Normal rate, regular rhythm and normal heart sounds.    Pulmonary/Chest: Effort normal and breath sounds normal.   Abdominal: Soft. She exhibits  distension. There is no tenderness.   Musculoskeletal: Normal range of motion. She exhibits no edema.   Neurological: She is alert.   AAO times 2   Skin: No erythema. No pallor.       MELD-Na score: 31 at 12/13/2017  3:45 PM  MELD score: 31 at 12/13/2017  3:45 PM  Calculated from:  Serum Creatinine: 0.8 mg/dL (Rounded to 1) at 12/13/2017  3:45 PM  Serum Sodium: 136 mmol/L at 12/13/2017  3:45 PM  Total Bilirubin: 28.7 mg/dL at 12/13/2017  3:45 PM  INR(ratio): 2.8 at 12/13/2017  4:06 AM  Age: 52 years    Significant Labs:  CBC:    Recent Labs  Lab 12/12/17  0446 12/13/17  0407   WBC 7.95 8.60   HGB 8.4* 8.5*   HCT 23.7* 23.8*   PLT 69* 70*     CMP:    Recent Labs  Lab 12/12/17  1810 12/13/17  0406 12/13/17  1545   * 132* 136   K 3.0* 2.8* 3.5   CL 93* 95 96   CO2 28 29 28    95 103   BUN 10 10 10   CREATININE 0.8 0.7 0.8   CALCIUM 9.4 8.9 9.5   PROT 6.2 5.5* 6.0   ALBUMIN 3.8 3.2* 3.3*   BILITOT 25.9* 24.9* 28.7*   ALKPHOS 128 137* 163*   AST 45* 41* 49*   ALT 41 35 41   ANIONGAP 11 8 12   EGFRNONAA >60.0 >60.0 >60.0     PTINR:    Recent Labs  Lab 12/12/17  0446 12/13/17  0406   INR 3.1* 2.8*       Significant Procedures:   Dobutamine Stress Test with Color Flow:   Results for orders placed or performed during the hospital encounter of 10/19/17   Dobutamine Stress Test w/ Color Flow   Result Value Ref Range    EF 73 55 - 65    Diastolic Dysfunction No     Est. PA Systolic Pressure 21.32     Tricuspid Valve Regurgitation TRIVIAL

## 2017-12-14 NOTE — ASSESSMENT & PLAN NOTE
MELD-Na score: 31 at 12/13/2017  3:45 PM  MELD score: 31 at 12/13/2017  3:45 PM  Calculated from:  Serum Creatinine: 0.8 mg/dL (Rounded to 1) at 12/13/2017  3:45 PM  Serum Sodium: 136 mmol/L at 12/13/2017  3:45 PM  Total Bilirubin: 28.7 mg/dL at 12/13/2017  3:45 PM  INR(ratio): 2.8 at 12/13/2017  4:06 AM  Age: 52 years   - had half of her evaluation completed on last admission  - AS per Psychiatry patient is moderate risk, will proceed with Liver transplant evaluation  - EGD performed, esophageal varices banded,   - Mammogram performed on 12/4/17   - C-scope repeated on 12/5- good prep  - accepted for liver transplant pending radiology review of old and new mammogram

## 2017-12-14 NOTE — PROGRESS NOTES
[facility] Hospital Medicine  Progress Note    Patient Name: Marcie Bazan  MRN: [id number]  Patient Class: IP- Inpatient   Admission Date: 11/23/2017  Length of Stay: 20 days  Expected Discharge Date: 12/18/2017  Attending Physician: Surinder Madrigal MD  Primary Care Provider: Milton Ferrer, [facility] Medicine Team: [facility] A Surinder Madrigal MD  Principal Problem:Decompensated hepatic cirrhosis    Subjective:     HPI:   52 year old female with PMHx of 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 Patient presented to [facility] with generalized weakness. Onset two months ago, progressively worsening. Was admitted to  11/10 for hyponatremia seen on labs in GI office. Discharged on 11/15 and returned on 11/20 with 1 week worsening weakness, malaise, lethargy, and decreased appetite. Denied fever/chills, abdominal pain, bright red or black tarry stools, hematemesis or other abnormal bleeding, confusion or disorientation, changes in urination, light-headedness, headache, changes in vision, dyspnea, chest pain or palpitations. Labs demonstrated Na 116 and MELD 32; patient admitted for hyponatremia. Nephrology consulted, recommended fluid restriction , holding diuretics, and prednisode taper that started 11/23 (decreased from 40mg QD to 20mg Qd.) Patient transferred to [facility] for revaluation of liver disease. Of note, paracentesis performed 11/22, removed 2L, negative for SBP.    Follows with Dr. Souza for cirrhosis. Since early 09/2017, patient's liver failure progressing indicated by worsening clinical signs (abdominal distention, weakness/fatigue) and MELD. In mid-October, patient's PETH demonstrated EtOH use and she was denied candidacy for liver transplant. She was then enrolled in in-patient substance abuse program at [facility]  where she has remained since early November; per patient, her last EtOH beverage was 11/01/2017. Denies history of GI bleed, SBP, renal disease, or other complications of cirrhosis. Liver biopsy 10/24 demonstrated stage 4 cirrhosis. Records reports EGD in August 2016 that showed small esophageal varices with severe gastritis. Colonoscopy 2015 with 2 polyps and internal hemorrhoids Started on prednisone 40mg QD on 10/25; was on spironolactone, has been held since 11/10 for hypoNa; on lactulose.    Hospital Course:  Mental status improving with hepatic encephalopathy treatment. Started empiric ceftriaxone given leukocytosis in immunocompromised state. CMV PCR positive overnight, for which ID was consulted, recommended repeating titers in 1 week. Seen by Psychiatry and  who determined she was moderate risk for Liver transplant. Will proceed with EGD/Cscope and mammogram after FFP. Colonoscopy not performed due to poor prep, EGD revealed large varices which were banded. IR paracentesis ordered after patient started complaining of abdominal pain. Mammogram performed, IR paracentesis after INR Reversal negative for SBP. Patient accepted for Liver transplant pending review of previous mammograms.       Interval History: patient seen and examined at bedside, more confused and lethargic today, improved after lactulose enema. Awaiting review of previous mammograms prior to listing.     Review of Systems   Constitutional: Negative for chills and fever.   HENT: Negative for rhinorrhea and sore throat.    Eyes: Negative for pain and discharge.   Respiratory: Negative for cough and shortness of breath.    Cardiovascular: Negative for chest pain and leg swelling.   Gastrointestinal: Positive for abdominal distention. Negative for abdominal pain, blood in stool, diarrhea, nausea and vomiting.   Endocrine: Negative for cold intolerance and heat intolerance.   Genitourinary: Negative for dysuria and flank pain.    Neurological: Negative for dizziness and numbness.   Psychiatric/Behavioral: Negative for behavioral problems and confusion.     Objective:     Vital Signs (Most Recent):  Temp: 98.9 °F (37.2 °C) (12/13/17 1731)  Pulse: 97 (12/13/17 1731)  Resp: 16 (12/13/17 1731)  BP: (!) 113/57 (12/13/17 1731)  SpO2: 98 % (12/13/17 1731) Vital Signs (24h Range):  Temp:  [97.9 °F (36.6 °C)-98.9 °F (37.2 °C)] 98.9 °F (37.2 °C)  Pulse:  [85-97] 97  Resp:  [16-18] 16  SpO2:  [94 %-100 %] 98 %  BP: (101-123)/(51-70) 113/57     Weight: 69.6 kg (153 lb 7 oz)  Body mass index is 25.53 kg/m².    Intake/Output Summary (Last 24 hours) at 12/13/17 1811  Last data filed at 12/13/17 0613   Gross per 24 hour   Intake              240 ml   Output                0 ml   Net              240 ml      Physical Exam   Constitutional: She appears well-developed and well-nourished.   HENT:   Head: Normocephalic and atraumatic.   Eyes: Conjunctivae are normal. Pupils are equal, round, and reactive to light. Scleral icterus is present.   Neck: Normal range of motion. No thyromegaly present.   Cardiovascular: Normal rate, regular rhythm and normal heart sounds.    Pulmonary/Chest: Effort normal and breath sounds normal.   Abdominal: Soft. She exhibits distension. There is no tenderness.   Musculoskeletal: Normal range of motion. She exhibits no edema.   Neurological: She is alert.   AAO times 2   Skin: No erythema. No pallor.       MELD-Na score: 31 at 12/13/2017  3:45 PM  MELD score: 31 at 12/13/2017  3:45 PM  Calculated from:  Serum Creatinine: 0.8 mg/dL (Rounded to 1) at 12/13/2017  3:45 PM  Serum Sodium: 136 mmol/L at 12/13/2017  3:45 PM  Total Bilirubin: 28.7 mg/dL at 12/13/2017  3:45 PM  INR(ratio): 2.8 at 12/13/2017  4:06 AM  Age: 52 years    Significant Labs:  CBC:    Recent Labs  Lab 12/12/17  0446 12/13/17  0407   WBC 7.95 8.60   HGB 8.4* 8.5*   HCT 23.7* 23.8*   PLT 69* 70*     CMP:    Recent Labs  Lab 12/12/17  1810 12/13/17  0406  12/13/17  1545   * 132* 136   K 3.0* 2.8* 3.5   CL 93* 95 96   CO2 28 29 28    95 103   BUN 10 10 10   CREATININE 0.8 0.7 0.8   CALCIUM 9.4 8.9 9.5   PROT 6.2 5.5* 6.0   ALBUMIN 3.8 3.2* 3.3*   BILITOT 25.9* 24.9* 28.7*   ALKPHOS 128 137* 163*   AST 45* 41* 49*   ALT 41 35 41   ANIONGAP 11 8 12   EGFRNONAA >60.0 >60.0 >60.0     PTINR:    Recent Labs  Lab 12/12/17  0446 12/13/17  0406   INR 3.1* 2.8*       Significant Procedures:   Dobutamine Stress Test with Color Flow:   Results for orders placed or performed during the hospital encounter of 10/19/17   Dobutamine Stress Test w/ Color Flow   Result Value Ref Range    EF 73 55 - 65    Diastolic Dysfunction No     Est. PA Systolic Pressure 21.32     Tricuspid Valve Regurgitation TRIVIAL             Assessment / Plan:     * Decompensated hepatic cirrhosis    MELD-Na score: 31 at 12/13/2017  3:45 PM  MELD score: 31 at 12/13/2017  3:45 PM  Calculated from:  Serum Creatinine: 0.8 mg/dL (Rounded to 1) at 12/13/2017  3:45 PM  Serum Sodium: 136 mmol/L at 12/13/2017  3:45 PM  Total Bilirubin: 28.7 mg/dL at 12/13/2017  3:45 PM  INR(ratio): 2.8 at 12/13/2017  4:06 AM  Age: 52 years   - had half of her evaluation completed on last admission  - AS per Psychiatry patient is moderate risk, will proceed with Liver transplant evaluation  - EGD performed, esophageal varices banded,   - Mammogram performed on 12/4/17   - C-scope repeated on 12/5- good prep  - accepted for liver transplant pending radiology review of old and new mammogram         Transplant donor evaluation    Presented at Liver selection committee on 12/06/17, approved pending review of previous mammograms           Immunosuppressed status    On chronic prednisone for alcoholic hepatitis; currently 15 mg, would go down by 5 weekly, next change would be 10 mg this saturday          Coagulopathy    INR 3.1          Alcoholic cirrhosis of liver with ascites    - see decompensated cirrhosis   - IR paracentesis  on 12/12 removed 5 liters         Cytomegalovirus (CMV) viremia    -- ID evaluated and do not feel it needs to be treated        Thrombocytopenia    -- monitor           Abnormal liver enzymes    -- Stable  -- Etiology liver failure; see that section for more details          Alcoholic hepatitis with ascites- decompensated with elevated MELD    - cont tapering prednisone, currently at 5 mg daily         Hyponatremia    - on 800 cc fluid restriction; on albumin  - 116-->118-->120-->122-->123-->126-->125-->127-->131-->133-->136        End stage liver disease              HCC (hepatocellular carcinoma)    AFP 9; seen on CT on 10/19, 1 cm focus, monitor for now          Hepatic encephalopathy    -- encephalopathic today; zinc added to lactulose and rifaximin; had 3 BMs yesterday, one time dose of Lactulose enema ordered.         Severe protein-calorie malnutrition    PAB, boost and dietary consult          Ascites    -- Holding diuretics at present, s/p 2L removed on 11/22 with testing negative for SBP  -- No abdominal pain today    - IR paracentesis on 12/12/17 removed 5.2 liters               Essential hypertension    - Stable  - Holding home lisinopril in setting of decompensated liver disease          Anemia of chronic disease    - monitor daily                VTE Risk Mitigation         Ordered     Place sequential compression device  Until discontinued      11/24/17 0734     Place FABIOLA hose  Until discontinued      11/23/17 2305     Medium Risk of VTE  Once      11/23/17 2305           Discharge Disposition: pending Liver transplant listing.     Time taken to evaluate, plan, and coordinate patient care: 35 minutes.    Surinder Madrigal MD  Department of Hospital Medicine  [facility]

## 2017-12-14 NOTE — ASSESSMENT & PLAN NOTE
- on 800 cc fluid restriction; on albumin  - 116-->118-->120-->122-->123-->126-->125-->127-->131-->133-->136

## 2017-12-14 NOTE — ASSESSMENT & PLAN NOTE
-- encephalopathic today; zinc added to lactulose and rifaximin; had 3 BMs yesterday, one time dose of Lactulose enema ordered.

## 2017-12-14 NOTE — ASSESSMENT & PLAN NOTE
- on 800 cc fluid restriction; on albumin  - 116-->118-->120-->122-->123-->126-->125-->127-->131-->133-->136-->135

## 2017-12-14 NOTE — ASSESSMENT & PLAN NOTE
MELD-Na score: 32 at 12/14/2017  4:38 AM  MELD score: 31 at 12/14/2017  4:38 AM  Calculated from:  Serum Creatinine: 0.8 mg/dL (Rounded to 1) at 12/14/2017  4:38 AM  Serum Sodium: 135 mmol/L at 12/14/2017  4:38 AM  Total Bilirubin: 27.4 mg/dL at 12/14/2017  4:38 AM  INR(ratio): 3.0 at 12/14/2017  4:38 AM  Age: 52 years   - had half of her evaluation completed on last admission  - AS per Psychiatry patient is moderate risk, will proceed with Liver transplant evaluation  - EGD performed, esophageal varices banded,   - Mammogram performed on 12/4/17   - C-scope repeated on 12/5- good prep  - accepted for liver transplant pending radiology review of old and new mammogram

## 2017-12-14 NOTE — PT/OT/SLP PROGRESS
Physical Therapy Treatment    Patient Name:  Marcie Bazan   MRN:  [id number]    Recommendations:     Discharge Recommendations:  home   Discharge Equipment Recommendations: walker, rolling, shower chair   Barriers to discharge: None    Assessment:     Marcie Bazan is a 52 y.o. female admitted with a medical diagnosis of Decompensated hepatic cirrhosis.  She presents with the following impairments/functional limitations:  impaired endurance. Pt tolerated treatment well, and will continue to benefit from PT services to improve overall endurance. Continue with PT POC as indicated.     Rehab Prognosis:  Good; patient would benefit from acute skilled PT services to address these deficits and reach maximum level of function.      Recent Surgery: Procedure(s) (LRB):  COLONOSCOPY (N/A) 9 Days Post-Op    Plan:     During this hospitalization, patient to be seen 2 x/week to address the above listed problems via gait training, therapeutic activities, therapeutic exercises  · Plan of Care Expires:  12/26/17   Plan of Care Reviewed with: patient    Subjective     Communicated with nursing prior to session.  Patient found supine in bed upon PT entry to room, agreeable to treatment.      Chief Complaint: none  Patient comments/goals: none   Pain/Comfort:  · Pain Rating 1: 0/10  · Pain Rating Post-Intervention 1: 0/10    Patients cultural, spiritual, Denominational conflicts given the current situation: none    Objective:     Patient found with:  (all lines intact)     General Precautions: Standard, fall   Orthopedic Precautions:N/A   Braces: N/A     Functional Mobility:  · Bed Mobility:     · Supine to Sit: modified independence  · Sit to Supine: modified independence  · Transfers:  Sit to Stand:  modified independence with 4 wheeled walker  · Gait: 300 ft., with rollator and SBA. No rest breaks taken on this date.       AM-PAC 6 CLICK MOBILITY  Turning over in bed (including adjusting bedclothes, sheets and blankets)?: 4  Sitting  down on and standing up from a chair with arms (e.g., wheelchair, bedside commode, etc.): 4  Moving from lying on back to sitting on the side of the bed?: 4  Moving to and from a bed to a chair (including a wheelchair)?: 4  Need to walk in hospital room?: 4  Climbing 3-5 steps with a railing?: 2  Total Score: 22       Therapeutic Activities and Exercises:   B LE therex x20 reps: AP, LAQ, Hip Flexion, and GS.     Patient left supine with all lines intact, call button in reach and bed alarm on..    GOALS:    Physical Therapy Goals        Problem: Physical Therapy Goal    Goal Priority Disciplines Outcome Goal Variances Interventions   Physical Therapy Goal     PT/OT, PT Ongoing (interventions implemented as appropriate)     Description:  Goals to be met by: 17    Patient will increase functional independence with mobility by performin. Supine to sit with Set-up [place] MET  2. Sit to supine with Set-up [place] MET  3. Sit to stand transfer with Supervision MET  4. Bed to chair transfer with Supervision using Rolling Walker MET  5. Gait  x 100 feet with Stand-by Assistance using Rolling Walker. MET  Revised: Gait x 300 ft with rollator usage and (S)   6. Lower extremity exercise program x20 reps per handout, with independence                          Time Tracking:     PT Received On: 17  PT Start Time: 1330     PT Stop Time: 1353  PT Total Time (min): 23 min     Billable Minutes: Gait Training 15 and Therapeutic Exercise 8    Treatment Type: Treatment  PT/PTA: PTA     PTA Visit Number: 1     Isaura Feldman, ABIMAEL  2017

## 2017-12-14 NOTE — PLAN OF CARE
Problem: Patient Care Overview  Goal: Plan of Care Review  Outcome: Ongoing (interventions implemented as appropriate)  POC reviewed w/ pt this am to include re-orientation as needed, scheduled lactulose, increasing activity, and fluid restriction compliance.  Pt disoriented at times, easily re-oriented.  Pt has had 3-4 loose BMs today so far, taking lactulose as scheduled.  Pt worked with therapy today, walked in room w/ walker.  Pt having difficulty with fluid restriction compliance due to multiple K-lyte replacements and med admin.  Pt has difficult time swallowing meds and needs an adequate amount of water to get them down adding to her fluid volume.  Pt remains jaundice with significant ascites

## 2017-12-14 NOTE — NURSING
Pt extremely withdrawn, unable to form sentences, flat affect. Pt only able to swallow half of medications for morning. MD notified, said to give oral lacutulose if tolerated. Pt given oral dose with very little improvement. MD notified, ordered lactulose enema. Enema given, pt able to retain. Greatly improved mental fx throughout shift, able to answer questions and respond appropriately at times, no return to baseline during shift.

## 2017-12-15 ENCOUNTER — CONFERENCE (OUTPATIENT)
Dept: TRANSPLANT | Facility: CLINIC | Age: 52
End: 2017-12-15

## 2017-12-15 LAB
ALBUMIN SERPL BCP-MCNC: 3.1 G/DL
ALBUMIN SERPL BCP-MCNC: 3.3 G/DL
ALP SERPL-CCNC: 184 U/L
ALP SERPL-CCNC: 215 U/L
ALT SERPL W/O P-5'-P-CCNC: 42 U/L
ALT SERPL W/O P-5'-P-CCNC: 49 U/L
ANION GAP SERPL CALC-SCNC: 11 MMOL/L
ANION GAP SERPL CALC-SCNC: 11 MMOL/L
AST SERPL-CCNC: 55 U/L
AST SERPL-CCNC: 58 U/L
BASOPHILS # BLD AUTO: 0.09 K/UL
BASOPHILS NFR BLD: 0.8 %
BILIRUB SERPL-MCNC: 25.6 MG/DL
BILIRUB SERPL-MCNC: 29.8 MG/DL
BUN SERPL-MCNC: 9 MG/DL
BUN SERPL-MCNC: 9 MG/DL
CALCIUM SERPL-MCNC: 9 MG/DL
CALCIUM SERPL-MCNC: 9.3 MG/DL
CHLORIDE SERPL-SCNC: 92 MMOL/L
CHLORIDE SERPL-SCNC: 95 MMOL/L
CO2 SERPL-SCNC: 23 MMOL/L
CO2 SERPL-SCNC: 26 MMOL/L
CREAT SERPL-MCNC: 0.8 MG/DL
CREAT SERPL-MCNC: 0.9 MG/DL
DIFFERENTIAL METHOD: ABNORMAL
EOSINOPHIL # BLD AUTO: 0.1 K/UL
EOSINOPHIL NFR BLD: 0.5 %
ERYTHROCYTE [DISTWIDTH] IN BLOOD BY AUTOMATED COUNT: 21.3 %
EST. GFR  (AFRICAN AMERICAN): >60 ML/MIN/1.73 M^2
EST. GFR  (AFRICAN AMERICAN): >60 ML/MIN/1.73 M^2
EST. GFR  (NON AFRICAN AMERICAN): >60 ML/MIN/1.73 M^2
EST. GFR  (NON AFRICAN AMERICAN): >60 ML/MIN/1.73 M^2
GLUCOSE SERPL-MCNC: 136 MG/DL
GLUCOSE SERPL-MCNC: 89 MG/DL
HCT VFR BLD AUTO: 26.6 %
HGB BLD-MCNC: 9.1 G/DL
IMM GRANULOCYTES # BLD AUTO: 0.04 K/UL
IMM GRANULOCYTES NFR BLD AUTO: 0.3 %
INR PPP: 3.3
LYMPHOCYTES # BLD AUTO: 5.6 K/UL
LYMPHOCYTES NFR BLD: 47.6 %
MAGNESIUM SERPL-MCNC: 1.1 MG/DL
MCH RBC QN AUTO: 30.8 PG
MCHC RBC AUTO-ENTMCNC: 34.2 G/DL
MCV RBC AUTO: 90 FL
MONOCYTES # BLD AUTO: 1.5 K/UL
MONOCYTES NFR BLD: 12.6 %
NEUTROPHILS # BLD AUTO: 4.5 K/UL
NEUTROPHILS NFR BLD: 38.2 %
NRBC BLD-RTO: 0 /100 WBC
PHOSPHATE SERPL-MCNC: 2.8 MG/DL
PLATELET # BLD AUTO: 78 K/UL
PMV BLD AUTO: 11 FL
POTASSIUM SERPL-SCNC: 3.1 MMOL/L
POTASSIUM SERPL-SCNC: 3.6 MMOL/L
PROT SERPL-MCNC: 5.8 G/DL
PROT SERPL-MCNC: 6.7 G/DL
PROTHROMBIN TIME: 33.4 SEC
RBC # BLD AUTO: 2.95 M/UL
SODIUM SERPL-SCNC: 129 MMOL/L
SODIUM SERPL-SCNC: 129 MMOL/L
WBC # BLD AUTO: 11.76 K/UL

## 2017-12-15 PROCEDURE — 63600175 PHARM REV CODE 636 W HCPCS: Performed by: HOSPITALIST

## 2017-12-15 PROCEDURE — 20600001 HC STEP DOWN PRIVATE ROOM

## 2017-12-15 PROCEDURE — 99233 SBSQ HOSP IP/OBS HIGH 50: CPT | Mod: ,,, | Performed by: HOSPITALIST

## 2017-12-15 PROCEDURE — 87040 BLOOD CULTURE FOR BACTERIA: CPT

## 2017-12-15 PROCEDURE — 25000003 PHARM REV CODE 250: Performed by: HOSPITALIST

## 2017-12-15 PROCEDURE — 36415 COLL VENOUS BLD VENIPUNCTURE: CPT

## 2017-12-15 PROCEDURE — 83735 ASSAY OF MAGNESIUM: CPT

## 2017-12-15 PROCEDURE — 84100 ASSAY OF PHOSPHORUS: CPT

## 2017-12-15 PROCEDURE — 85025 COMPLETE CBC W/AUTO DIFF WBC: CPT

## 2017-12-15 PROCEDURE — 85610 PROTHROMBIN TIME: CPT

## 2017-12-15 PROCEDURE — 25000003 PHARM REV CODE 250: Performed by: STUDENT IN AN ORGANIZED HEALTH CARE EDUCATION/TRAINING PROGRAM

## 2017-12-15 PROCEDURE — 99233 SBSQ HOSP IP/OBS HIGH 50: CPT | Mod: ,,, | Performed by: INTERNAL MEDICINE

## 2017-12-15 PROCEDURE — 80053 COMPREHEN METABOLIC PANEL: CPT | Mod: 91

## 2017-12-15 RX ORDER — POTASSIUM CHLORIDE 20 MEQ/1
20 TABLET, EXTENDED RELEASE ORAL DAILY
Status: DISCONTINUED | OUTPATIENT
Start: 2017-12-15 | End: 2017-12-22

## 2017-12-15 RX ORDER — POTASSIUM CHLORIDE 20 MEQ/1
60 TABLET, EXTENDED RELEASE ORAL
Status: COMPLETED | OUTPATIENT
Start: 2017-12-15 | End: 2017-12-15

## 2017-12-15 RX ORDER — MAGNESIUM SULFATE HEPTAHYDRATE 40 MG/ML
2 INJECTION, SOLUTION INTRAVENOUS
Status: COMPLETED | OUTPATIENT
Start: 2017-12-15 | End: 2017-12-15

## 2017-12-15 RX ADMIN — MAGNESIUM SULFATE IN WATER 2 G: 40 INJECTION, SOLUTION INTRAVENOUS at 10:12

## 2017-12-15 RX ADMIN — MAGNESIUM SULFATE IN WATER 2 G: 40 INJECTION, SOLUTION INTRAVENOUS at 05:12

## 2017-12-15 RX ADMIN — Medication 220 MG: at 10:12

## 2017-12-15 RX ADMIN — PANTOPRAZOLE SODIUM 40 MG: 40 TABLET, DELAYED RELEASE ORAL at 10:12

## 2017-12-15 RX ADMIN — MIDODRINE HYDROCHLORIDE 5 MG: 5 TABLET ORAL at 05:12

## 2017-12-15 RX ADMIN — LACTULOSE 30 G: 20 SOLUTION ORAL at 03:12

## 2017-12-15 RX ADMIN — RIFAXIMIN 550 MG: 550 TABLET ORAL at 10:12

## 2017-12-15 RX ADMIN — POTASSIUM CHLORIDE 60 MEQ: 1500 TABLET, EXTENDED RELEASE ORAL at 05:12

## 2017-12-15 RX ADMIN — MIDODRINE HYDROCHLORIDE 5 MG: 5 TABLET ORAL at 09:12

## 2017-12-15 RX ADMIN — RIFAXIMIN 550 MG: 550 TABLET ORAL at 09:12

## 2017-12-15 RX ADMIN — FUROSEMIDE 40 MG: 40 TABLET ORAL at 09:12

## 2017-12-15 RX ADMIN — LACTULOSE 30 G: 20 SOLUTION ORAL at 05:12

## 2017-12-15 RX ADMIN — SPIRONOLACTONE 100 MG: 100 TABLET ORAL at 10:12

## 2017-12-15 RX ADMIN — MAGNESIUM SULFATE IN WATER 2 G: 40 INJECTION, SOLUTION INTRAVENOUS at 09:12

## 2017-12-15 RX ADMIN — PREDNISONE 5 MG: 5 TABLET ORAL at 10:12

## 2017-12-15 RX ADMIN — MIDODRINE HYDROCHLORIDE 5 MG: 5 TABLET ORAL at 03:12

## 2017-12-15 RX ADMIN — MAGNESIUM SULFATE IN WATER 2 G: 40 INJECTION, SOLUTION INTRAVENOUS at 01:12

## 2017-12-15 RX ADMIN — LACTULOSE 30 G: 20 SOLUTION ORAL at 09:12

## 2017-12-15 RX ADMIN — POTASSIUM CHLORIDE 60 MEQ: 1500 TABLET, EXTENDED RELEASE ORAL at 06:12

## 2017-12-15 RX ADMIN — POTASSIUM CHLORIDE 20 MEQ: 1500 TABLET, EXTENDED RELEASE ORAL at 10:12

## 2017-12-15 NOTE — SUBJECTIVE & OBJECTIVE
Interval History: Patient seen and examined at bedside, no acute events overnight. Mammograms arrived, will be discussed to determine next step prior to official listing.     Review of Systems   Constitutional: Negative for chills and fever.   HENT: Negative for rhinorrhea and sore throat.    Eyes: Negative for pain and discharge.   Respiratory: Negative for cough and shortness of breath.    Cardiovascular: Negative for chest pain and leg swelling.   Gastrointestinal: Positive for abdominal distention. Negative for abdominal pain, blood in stool, diarrhea, nausea and vomiting.   Endocrine: Negative for cold intolerance and heat intolerance.   Genitourinary: Negative for dysuria and flank pain.   Neurological: Negative for dizziness and numbness.   Psychiatric/Behavioral: Negative for behavioral problems and confusion.     Objective:     Vital Signs (Most Recent):  Temp: 98.4 °F (36.9 °C) (12/15/17 1142)  Pulse: 105 (12/15/17 1142)  Resp: 17 (12/15/17 1142)  BP: (!) 119/59 (12/15/17 1142)  SpO2: 100 % (12/15/17 1142) Vital Signs (24h Range):  Temp:  [98.4 °F (36.9 °C)-98.8 °F (37.1 °C)] 98.4 °F (36.9 °C)  Pulse:  [] 105  Resp:  [15-18] 17  SpO2:  [95 %-100 %] 100 %  BP: (111-125)/(57-68) 119/59     Weight: 69.6 kg (153 lb 7 oz)  Body mass index is 25.53 kg/m².    Intake/Output Summary (Last 24 hours) at 12/15/17 1351  Last data filed at 12/15/17 1300   Gross per 24 hour   Intake             1510 ml   Output                0 ml   Net             1510 ml      Physical Exam   Constitutional: She appears well-developed and well-nourished.   HENT:   Head: Normocephalic and atraumatic.   Eyes: Conjunctivae are normal. Pupils are equal, round, and reactive to light. Scleral icterus is present.   Neck: Normal range of motion. No thyromegaly present.   Cardiovascular: Normal rate, regular rhythm and normal heart sounds.    Pulmonary/Chest: Effort normal and breath sounds normal.   Abdominal: Soft. She exhibits  distension. There is no tenderness.   Musculoskeletal: Normal range of motion. She exhibits no edema.   Neurological: She is alert.   AAO times 2   Skin: No erythema. No pallor.       MELD-Na score: 34 at 12/15/2017  5:17 AM  MELD score: 32 at 12/15/2017  5:17 AM  Calculated from:  Serum Creatinine: 0.8 mg/dL (Rounded to 1) at 12/15/2017  5:17 AM  Serum Sodium: 129 mmol/L at 12/15/2017  5:17 AM  Total Bilirubin: 25.6 mg/dL at 12/15/2017  5:17 AM  INR(ratio): 3.3 at 12/15/2017  5:17 AM  Age: 52 years    Significant Labs:  CBC:    Recent Labs  Lab 12/14/17  0438 12/15/17  0517   WBC 9.22 11.76   HGB 9.2* 9.1*   HCT 25.9* 26.6*   PLT 67* 78*     CMP:    Recent Labs  Lab 12/14/17 0438 12/14/17  1646 12/15/17  0517   * 132* 129*   K 3.3* 4.1 3.6   CL 98 94* 95   CO2 26 27 23   GLU 86 107 89   BUN 10 10 9   CREATININE 0.8 0.8 0.8   CALCIUM 9.1 9.5 9.0   PROT 5.8* 6.3 5.8*   ALBUMIN 3.1* 3.3* 3.1*   BILITOT 27.4* 28.8* 25.6*   ALKPHOS 167* 190* 184*   AST 50* 52* 55*   ALT 41 43 42   ANIONGAP 11 11 11   EGFRNONAA >60.0 >60.0 >60.0     PTINR:    Recent Labs  Lab 12/14/17 0438 12/15/17  0517   INR 3.0* 3.3*       Significant Procedures:   Dobutamine Stress Test with Color Flow:   Results for orders placed or performed during the hospital encounter of 10/19/17   Dobutamine Stress Test w/ Color Flow   Result Value Ref Range    EF 73 55 - 65    Diastolic Dysfunction No     Est. PA Systolic Pressure 21.32     Tricuspid Valve Regurgitation TRIVIAL

## 2017-12-15 NOTE — PROGRESS NOTES
[facility] Hospital Medicine  Progress Note    Patient Name: Marcie Bazan  MRN: [id number]  Patient Class: IP- Inpatient   Admission Date: 11/23/2017  Length of Stay: 22 days  Attending Physician: Surinder Madrigal MD  Primary Care Provider: Milton Ferrer, [facility] Medicine Team: [facility] [place] HOSP MED A Surinder Madrigal MD    Subjective:     Principal Problem:Decompensated hepatic cirrhosis    HPI:  52 year old female with PMHx of 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 Patient presented to [facility] with generalized weakness. Onset two months ago, progressively worsening. Was admitted to  11/10 for hyponatremia seen on labs in GI office. Discharged on 11/15 and returned on 11/20 with 1 week worsening weakness, malaise, lethargy, and decreased appetite. Denied fever/chills, abdominal pain, bright red or black tarry stools, hematemesis or other abnormal bleeding, confusion or disorientation, changes in urination, light-headedness, headache, changes in vision, dyspnea, chest pain or palpitations. Labs demonstrated Na 116 and MELD 32; patient admitted for hyponatremia. Nephrology consulted, recommended fluid restriction , holding diuretics, and prednisode taper that started 11/23 (decreased from 40mg QD to 20mg Qd.) Patient transferred to [facility] for revaluation of liver disease. Of note, paracentesis performed 11/22, removed 2L, negative for SBP.    Follows with Dr. Souza for cirrhosis. Since early 09/2017, patient's liver failure progressing indicated by worsening clinical signs (abdominal distention, weakness/fatigue) and MELD. In mid-October, patient's PETH demonstrated EtOH use and she was denied candidacy for liver transplant. She was then enrolled in in-patient substance abuse program at [facility] where she has remained since  early November; per patient, her last EtOH beverage was 11/01/2017. Denies history of GI bleed, SBP, renal disease, or other complications of cirrhosis. Liver biopsy 10/24 demonstrated stage 4 cirrhosis. Records reports EGD in August 2016 that showed small esophageal varices with severe gastritis. Colonoscopy 2015 with 2 polyps and internal hemorrhoids Started on prednisone 40mg QD on 10/25; was on spironolactone, has been held since 11/10 for hypoNa; on lactulose.    Hospital Course:  Mental status improving with hepatic encephalopathy treatment. Started empiric ceftriaxone given leukocytosis in immunocompromised state. CMV PCR positive overnight, for which ID was consulted, recommended repeating titers in 1 week. Seen by Psychiatry and  who determined she was moderate risk for Liver transplant. Will proceed with EGD/Cscope and mammogram after FFP. Colonoscopy not performed due to poor prep, EGD revealed large varices which were banded. IR paracentesis ordered after patient started complaining of abdominal pain. Mammogram performed, IR paracentesis after INR Reversal negative for SBP. Patient accepted for Liver transplant pending review of previous mammograms.  Patient was slightly confused and altered, which resolved with lactulose enemas.       Interval History: Patient seen and examined at bedside, no acute events overnight. Mammograms arrived, will be discussed to determine next step prior to official listing.     Review of Systems   Constitutional: Negative for chills and fever.   HENT: Negative for rhinorrhea and sore throat.    Eyes: Negative for pain and discharge.   Respiratory: Negative for cough and shortness of breath.    Cardiovascular: Negative for chest pain and leg swelling.   Gastrointestinal: Positive for abdominal distention. Negative for abdominal pain, blood in stool, diarrhea, nausea and vomiting.   Endocrine: Negative for cold intolerance and heat intolerance.   Genitourinary:  Negative for dysuria and flank pain.   Neurological: Negative for dizziness and numbness.   Psychiatric/Behavioral: Negative for behavioral problems and confusion.     Objective:     Vital Signs (Most Recent):  Temp: 98.4 °F (36.9 °C) (12/15/17 1142)  Pulse: 105 (12/15/17 1142)  Resp: 17 (12/15/17 1142)  BP: (!) 119/59 (12/15/17 1142)  SpO2: 100 % (12/15/17 1142) Vital Signs (24h Range):  Temp:  [98.4 °F (36.9 °C)-98.8 °F (37.1 °C)] 98.4 °F (36.9 °C)  Pulse:  [] 105  Resp:  [15-18] 17  SpO2:  [95 %-100 %] 100 %  BP: (111-125)/(57-68) 119/59     Weight: 69.6 kg (153 lb 7 oz)  Body mass index is 25.53 kg/m².    Intake/Output Summary (Last 24 hours) at 12/15/17 1351  Last data filed at 12/15/17 1300   Gross per 24 hour   Intake             1510 ml   Output                0 ml   Net             1510 ml      Physical Exam   Constitutional: She appears well-developed and well-nourished.   HENT:   Head: Normocephalic and atraumatic.   Eyes: Conjunctivae are normal. Pupils are equal, round, and reactive to light. Scleral icterus is present.   Neck: Normal range of motion. No thyromegaly present.   Cardiovascular: Normal rate, regular rhythm and normal heart sounds.    Pulmonary/Chest: Effort normal and breath sounds normal.   Abdominal: Soft. She exhibits distension. There is no tenderness.   Musculoskeletal: Normal range of motion. She exhibits no edema.   Neurological: She is alert.   AAO times 2   Skin: No erythema. No pallor.       MELD-Na score: 34 at 12/15/2017  5:17 AM  MELD score: 32 at 12/15/2017  5:17 AM  Calculated from:  Serum Creatinine: 0.8 mg/dL (Rounded to 1) at 12/15/2017  5:17 AM  Serum Sodium: 129 mmol/L at 12/15/2017  5:17 AM  Total Bilirubin: 25.6 mg/dL at 12/15/2017  5:17 AM  INR(ratio): 3.3 at 12/15/2017  5:17 AM  Age: 52 years    Significant Labs:  CBC:    Recent Labs  Lab 12/14/17  0438 12/15/17  0517   WBC 9.22 11.76   HGB 9.2* 9.1*   HCT 25.9* 26.6*   PLT 67* 78*     CMP:    Recent Labs  Lab  12/14/17 0438 12/14/17  1646 12/15/17  0517   * 132* 129*   K 3.3* 4.1 3.6   CL 98 94* 95   CO2 26 27 23   GLU 86 107 89   BUN 10 10 9   CREATININE 0.8 0.8 0.8   CALCIUM 9.1 9.5 9.0   PROT 5.8* 6.3 5.8*   ALBUMIN 3.1* 3.3* 3.1*   BILITOT 27.4* 28.8* 25.6*   ALKPHOS 167* 190* 184*   AST 50* 52* 55*   ALT 41 43 42   ANIONGAP 11 11 11   EGFRNONAA >60.0 >60.0 >60.0     PTINR:    Recent Labs  Lab 12/14/17  0438 12/15/17  0517   INR 3.0* 3.3*       Significant Procedures:   Dobutamine Stress Test with Color Flow:   Results for orders placed or performed during the hospital encounter of 10/19/17   Dobutamine Stress Test w/ Color Flow   Result Value Ref Range    EF 73 55 - 65    Diastolic Dysfunction No     Est. PA Systolic Pressure 21.32     Tricuspid Valve Regurgitation TRIVIAL             Assessment/Plan:      * Decompensated hepatic cirrhosis    MELD-Na score: 34 at 12/15/2017  5:17 AM  MELD score: 32 at 12/15/2017  5:17 AM  Calculated from:  Serum Creatinine: 0.8 mg/dL (Rounded to 1) at 12/15/2017  5:17 AM  Serum Sodium: 129 mmol/L at 12/15/2017  5:17 AM  Total Bilirubin: 25.6 mg/dL at 12/15/2017  5:17 AM  INR(ratio): 3.3 at 12/15/2017  5:17 AM  Age: 52 years   - had half of her evaluation completed on last admission  - AS per Psychiatry patient is moderate risk, will proceed with Liver transplant evaluation  - EGD performed, esophageal varices banded,   - Mammogram performed on 12/4/17   - C-scope repeated on 12/5- good prep  - accepted for liver transplant pending radiology review of old and new mammogram         Transplant donor evaluation    Presented at Liver selection committee on 12/06/17, approved pending review of previous mammograms           Immunosuppressed status    On chronic prednisone for alcoholic hepatitis; currently 15 mg, would go down by 5 weekly, next change would be 10 mg this saturday          Coagulopathy    INR 3.0          Alcoholic cirrhosis of liver with ascites    - see  decompensated cirrhosis   - IR paracentesis on 12/12 removed 5 liters         Cytomegalovirus (CMV) viremia    -- ID evaluated and do not feel it needs to be treated        Thrombocytopenia    -- monitor           Abnormal liver enzymes    -- Stable  -- Etiology liver failure; see that section for more details          Alcoholic hepatitis with ascites- decompensated with elevated MELD    - cont tapering prednisone, currently at 5 mg daily         Hyponatremia    - on 800 cc fluid restriction; on albumin  - 116-->118-->120-->122-->123-->126-->125-->127-->131-->133-->136-->135        End stage liver disease              HCC (hepatocellular carcinoma)    AFP 9; seen on CT on 10/19, 1 cm focus, monitor for now          Hepatic encephalopathy    -- mental status improved considerably after lactulose enemas         Severe protein-calorie malnutrition    PAB, boost and dietary consult          Ascites    -- Holding diuretics at present, s/p 2L removed on 11/22 with testing negative for SBP  -- No abdominal pain today    - IR paracentesis on 12/12/17 removed 5.2 liters               Essential hypertension    - Stable  - Holding home lisinopril in setting of decompensated liver disease          Anemia of chronic disease    - monitor daily            VTE Risk Mitigation         Ordered     Place sequential compression device  Until discontinued      11/24/17 0734     Place FABIOLA hose  Until discontinued      11/23/17 2305     Medium Risk of VTE  Once      11/23/17 2305              Surinder Madrigal MD  Department of Hospital Medicine   [facility]

## 2017-12-15 NOTE — PROGRESS NOTES
[facility]  Hepatology  Progress Note    Patient Name: Marcie Bazan  MRN: [id number]  Admission Date: 11/23/2017  Hospital Length of Stay: 22 days  Attending Provider: Surinder Madrigal MD   Primary Care Physician: ODILIA Wall  Principal Problem:Decompensated hepatic cirrhosis    Subjective:     Transplant status: Pre-transplant    HPI: 52 year old female with PMHx of ESLD 2/2 EtOH hepatitis and essential HTN who presents to [facility] as a direct transfer from [facility] for evaluation of decompensated liver cirrhosis. Patient presented to [facility] with generalized weakness and was admitted to  11/10 for hyponatremia seen on labs in GI office. Discharged on 11/15 and returned on 11/20 with 1 week worsening weakness, malaise, lethargy, and decreased appetite. Denied fever/chills, abdominal pain, bright red or black tarry stools, hematemesis or other abnormal bleeding, confusion or disorientation, changes in urination, light-headedness, headache, changes in vision, dyspnea, chest pain or palpitations. Labs demonstrated Na 116 and MELD 32; patient admitted for hyponatremia. Nephrology consulted, recommended fluid restriction , holding diuretics, and prednisode taper that started 11/23 (decreased from 40mg QD to 20mg Qd.) Patient transferred to [facility] for revaluation of liver disease. Of note, paracentesis performed 11/22, removed 2L, negative for SBP.     Follows with Dr. Souza for cirrhosis. Since early 09/2017, patient's liver failure progressing indicated by worsening clinical signs (abdominal distention, weakness/fatigue) and MELD. She had been evaluated by [facility] liver transplant committee 10/25/2017 and declined transplant candidacy 2/2 continued EtOH use and lack of solid caregiver support/plan.She has since been enrolled in in-patient substance abuse program at [facility] where she has remained since early November; per patient, her last EtOH beverage  "was 11/01/2017.  Per chart review on 11/22/17:     "Patient's case was discussed in liver selection committee today. Per committee discussion, the SW will call patient's daughter to discuss caregiver plan at this time. If the caregiver plan is adequate, patient will need to follow up with either/both addiction psych or SW regarding substance abuse and plan. May need to do this inpatient if patient is still admitted."        Denies history of GI bleed, SBP, renal disease, or other complications of cirrhosis. Liver biopsy 10/24 demonstrated stage 4 cirrhosis. Records reports EGD in August 2016 that showed small esophageal varices with severe gastritis. Colonoscopy 2015 with 2 polyps and internal hemorrhoids Started on prednisone 40mg QD on 10/25; was on spironolactone, has been held since 11/10 for hypoNa; on lactulose.    Interval History:     More alert. Feeling well. Asking about her mammo results. Didn't eat breakfast.    Current Facility-Administered Medications   Medication    furosemide tablet 40 mg    GI cocktail (mylanta 30 mL, lidocaine 2 % viscous 10 mL, dicyclomine 10 mL) 50 mL    lactulose 20 gram/30 mL solution Soln 30 g    midodrine tablet 5 mg    ondansetron disintegrating tablet 4 mg    ondansetron injection 4 mg    pantoprazole EC tablet 40 mg    potassium chloride SA CR tablet 20 mEq    rifAXIMin tablet 550 mg    simethicone chewable tablet 80 mg    sodium chloride 0.9% flush 3 mL    sodium chloride 0.9% flush 3 mL    sodium chloride 0.9% flush 5 mL    spironolactone tablet 100 mg    zinc sulfate capsule 220 mg       Objective:     Vital Signs (Most Recent):  Temp: 98.4 °F (36.9 °C) (12/15/17 1142)  Pulse: 105 (12/15/17 1142)  Resp: 17 (12/15/17 1142)  BP: (!) 119/59 (12/15/17 1142)  SpO2: 100 % (12/15/17 1142) Vital Signs (24h Range):  Temp:  [98.4 °F (36.9 °C)-98.8 °F (37.1 °C)] 98.4 °F (36.9 °C)  Pulse:  [] 105  Resp:  [15-18] 17  SpO2:  [95 %-100 %] 100 %  BP: " (111-125)/(57-68) 119/59     Weight: 69.6 kg (153 lb 7 oz) (12/12/17 0700)  Body mass index is 25.53 kg/m².    Physical Exam   Constitutional: She appears ill. No distress.   Eyes: Scleral icterus is present.   Cardiovascular: Normal rate.  Exam reveals no friction rub.    Pulmonary/Chest: Effort normal. No respiratory distress.   Abdominal: Soft. Bowel sounds are normal. She exhibits distension. There is no tenderness. There is no guarding.   Musculoskeletal: She exhibits edema.   Neurological: She is alert.   alert today   Vitals reviewed.      MELD-Na score: 34 at 12/15/2017  5:17 AM  MELD score: 32 at 12/15/2017  5:17 AM  Calculated from:  Serum Creatinine: 0.8 mg/dL (Rounded to 1) at 12/15/2017  5:17 AM  Serum Sodium: 129 mmol/L at 12/15/2017  5:17 AM  Total Bilirubin: 25.6 mg/dL at 12/15/2017  5:17 AM  INR(ratio): 3.3 at 12/15/2017  5:17 AM  Age: 52 years    Significant Labs:  CBC:   Recent Labs  Lab 12/15/17  0517   WBC 11.76   RBC 2.95*   HGB 9.1*   HCT 26.6*   PLT 78*     CMP:   Recent Labs  Lab 12/15/17  0517   GLU 89   CALCIUM 9.0   ALBUMIN 3.1*   PROT 5.8*   *   K 3.6   CO2 23   CL 95   BUN 9   CREATININE 0.8   ALKPHOS 184*   ALT 42   AST 55*   BILITOT 25.6*     Coagulation:   Recent Labs  Lab 12/15/17  0517   INR 3.3*       Significant Imaging:  Labs: Reviewed    Assessment/Plan:     * Decompensated hepatic cirrhosis    # Decompensated Cirrhosis due to alcohol, in remission.  # Ascites  # Hepatic encephalopathy  # Esophageal varices - banded during this admission.  # Anemia - multifactorial  # HCC - 1cm liver mass on tpCT on 10/20/17  # Liver transplant candidacy - Completed workup.   # Addiction risk - was doing inpatient rehab. Seen by addiction psych, thought to have improved insight.     Her mammogram shows BiRADS 4.   Will need core needle Biospy for Monday. Need INR < 2 for procedure.      Plan:  Approved for listing pending core needle breast biopsy  IV VitK over weekend ; FFP Monday  morning for goal INR < 2  Core biopsy breast on Monday  Repeat CXR and cultures  1.5 L fluid restriction  lasix 40 and aldactone 100 daily  Cont empiric fluconazole  Continue lactulose and rifaximin and zinc  Continue PPI and PRN gi cocktail     General recommendations:  · Daily CBC, CMP and INR for MELD calculation  · Frequent finger stick blood sugar check if active hepatic encepahalopathy  · Avoid narcotics and sedatives  · Low Na diet              Thank you for your consult. I will follow-up with patient. Please contact us if you have any additional questions.    Stalin Powers MD  Hepatology  [facility]

## 2017-12-15 NOTE — ASSESSMENT & PLAN NOTE
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  # Ascites  # Hepatic encephalopathy  # Esophageal varices - banded during this admission.  # Anemia - multifactorial  # HCC - 1cm liver mass on tpCT on 10/20/17  # Liver transplant candidacy - Completed workup.   # Addiction risk - was doing inpatient rehab. Seen by addiction psych, thought to have improved insight.     Her mammogram shows BiRADS 4.   Will need core needle Biospy for Monday. Need INR < 2 for procedure.      Plan:  Approved for listing pending core needle breast biopsy  IV VitK over weekend ; FFP Monday morning for goal INR < 2  Core biopsy breast on Monday  Repeat CXR and cultures  1.5 L fluid restriction  lasix 40 and aldactone 100 daily  Cont empiric fluconazole  Continue lactulose and rifaximin and zinc  Continue PPI and PRN gi cocktail     General recommendations:  · Daily CBC, CMP and INR for MELD calculation  · Frequent finger stick blood sugar check if active hepatic encepahalopathy  · Avoid narcotics and sedatives  · Low Na diet

## 2017-12-15 NOTE — ASSESSMENT & PLAN NOTE
MELD-Na score: 34 at 12/15/2017  5:17 AM  MELD score: 32 at 12/15/2017  5:17 AM  Calculated from:  Serum Creatinine: 0.8 mg/dL (Rounded to 1) at 12/15/2017  5:17 AM  Serum Sodium: 129 mmol/L at 12/15/2017  5:17 AM  Total Bilirubin: 25.6 mg/dL at 12/15/2017  5:17 AM  INR(ratio): 3.3 at 12/15/2017  5:17 AM  Age: 52 years   - had half of her evaluation completed on last admission  - AS per Psychiatry patient is moderate risk, will proceed with Liver transplant evaluation  - EGD performed, esophageal varices banded,   - Mammogram performed on 12/4/17   - C-scope repeated on 12/5- good prep  - accepted for liver transplant pending radiology review of old and new mammogram

## 2017-12-15 NOTE — PLAN OF CARE
12/15/17 1112   Discharge Reassessment   Assessment Type Discharge Planning Reassessment   Provided patient/caregiver education on the expected discharge date and the discharge plan Yes   Do you have any problems affording any of your prescribed medications? No   Discharge Plan A (transfer to LTS)   Discharge Plan B Home with family;Home Health   Can the patient answer the patient profile reliably? (Mentation waxes and wanes)   How does the patient rate their overall health at the present time? Fair   Describe the patient's ability to walk at the present time. Walks with the help of equipment   How often would a person be available to care for the patient? Often   Number of comorbid conditions (as recorded on the chart) Five or more   During the past month, has the patient often been bothered by feeling down, depressed or hopeless? No   During the past month, has the patient often been bothered by little interest or pleasure in doing things? No

## 2017-12-15 NOTE — SUBJECTIVE & OBJECTIVE
Interval History:     More alert. Feeling well. Asking about her mammo results. Didn't eat breakfast.    Current Facility-Administered Medications   Medication    furosemide tablet 40 mg    GI cocktail (mylanta 30 mL, lidocaine 2 % viscous 10 mL, dicyclomine 10 mL) 50 mL    lactulose 20 gram/30 mL solution Soln 30 g    midodrine tablet 5 mg    ondansetron disintegrating tablet 4 mg    ondansetron injection 4 mg    pantoprazole EC tablet 40 mg    potassium chloride SA CR tablet 20 mEq    rifAXIMin tablet 550 mg    simethicone chewable tablet 80 mg    sodium chloride 0.9% flush 3 mL    sodium chloride 0.9% flush 3 mL    sodium chloride 0.9% flush 5 mL    spironolactone tablet 100 mg    zinc sulfate capsule 220 mg       Objective:     Vital Signs (Most Recent):  Temp: 98.4 °F (36.9 °C) (12/15/17 1142)  Pulse: 105 (12/15/17 1142)  Resp: 17 (12/15/17 1142)  BP: (!) 119/59 (12/15/17 1142)  SpO2: 100 % (12/15/17 1142) Vital Signs (24h Range):  Temp:  [98.4 °F (36.9 °C)-98.8 °F (37.1 °C)] 98.4 °F (36.9 °C)  Pulse:  [] 105  Resp:  [15-18] 17  SpO2:  [95 %-100 %] 100 %  BP: (111-125)/(57-68) 119/59     Weight: 69.6 kg (153 lb 7 oz) (12/12/17 0700)  Body mass index is 25.53 kg/m².    Physical Exam   Constitutional: She appears ill. No distress.   Eyes: Scleral icterus is present.   Cardiovascular: Normal rate.  Exam reveals no friction rub.    Pulmonary/Chest: Effort normal. No respiratory distress.   Abdominal: Soft. Bowel sounds are normal. She exhibits distension. There is no tenderness. There is no guarding.   Musculoskeletal: She exhibits edema.   Neurological: She is alert.   alert today   Vitals reviewed.      MELD-Na score: 34 at 12/15/2017  5:17 AM  MELD score: 32 at 12/15/2017  5:17 AM  Calculated from:  Serum Creatinine: 0.8 mg/dL (Rounded to 1) at 12/15/2017  5:17 AM  Serum Sodium: 129 mmol/L at 12/15/2017  5:17 AM  Total Bilirubin: 25.6 mg/dL at 12/15/2017  5:17 AM  INR(ratio): 3.3 at  12/15/2017  5:17 AM  Age: 52 years    Significant Labs:  CBC:   Recent Labs  Lab 12/15/17  0517   WBC 11.76   RBC 2.95*   HGB 9.1*   HCT 26.6*   PLT 78*     CMP:   Recent Labs  Lab 12/15/17  0517   GLU 89   CALCIUM 9.0   ALBUMIN 3.1*   PROT 5.8*   *   K 3.6   CO2 23   CL 95   BUN 9   CREATININE 0.8   ALKPHOS 184*   ALT 42   AST 55*   BILITOT 25.6*     Coagulation:   Recent Labs  Lab 12/15/17  0517   INR 3.3*       Significant Imaging:  Labs: Reviewed

## 2017-12-15 NOTE — TELEPHONE ENCOUNTER
Patient mammogram recommendations discussed with committee members.  Committee agreed mass in Right breast should be biopsied ASAP.  Request to contact Dr. Gretchen Vo.  Patient INR to be brought down to 2.0.  Communications will be sent out.

## 2017-12-16 LAB
ALBUMIN SERPL BCP-MCNC: 2.8 G/DL
ALBUMIN SERPL BCP-MCNC: 3.1 G/DL
ALP SERPL-CCNC: 201 U/L
ALP SERPL-CCNC: 212 U/L
ALT SERPL W/O P-5'-P-CCNC: 48 U/L
ALT SERPL W/O P-5'-P-CCNC: 49 U/L
ANION GAP SERPL CALC-SCNC: 11 MMOL/L
ANION GAP SERPL CALC-SCNC: 12 MMOL/L
AST SERPL-CCNC: 62 U/L
AST SERPL-CCNC: 63 U/L
BASOPHILS # BLD AUTO: 0.05 K/UL
BASOPHILS NFR BLD: 0.4 %
BILIRUB SERPL-MCNC: 27.8 MG/DL
BILIRUB SERPL-MCNC: 30 MG/DL
BUN SERPL-MCNC: 10 MG/DL
BUN SERPL-MCNC: 9 MG/DL
CALCIUM SERPL-MCNC: 9 MG/DL
CALCIUM SERPL-MCNC: 9.1 MG/DL
CHLORIDE SERPL-SCNC: 92 MMOL/L
CHLORIDE SERPL-SCNC: 92 MMOL/L
CO2 SERPL-SCNC: 23 MMOL/L
CO2 SERPL-SCNC: 24 MMOL/L
CREAT SERPL-MCNC: 0.9 MG/DL
CREAT SERPL-MCNC: 0.9 MG/DL
DIFFERENTIAL METHOD: ABNORMAL
EOSINOPHIL # BLD AUTO: 0 K/UL
EOSINOPHIL NFR BLD: 0.3 %
ERYTHROCYTE [DISTWIDTH] IN BLOOD BY AUTOMATED COUNT: 20.2 %
EST. GFR  (AFRICAN AMERICAN): >60 ML/MIN/1.73 M^2
EST. GFR  (AFRICAN AMERICAN): >60 ML/MIN/1.73 M^2
EST. GFR  (NON AFRICAN AMERICAN): >60 ML/MIN/1.73 M^2
EST. GFR  (NON AFRICAN AMERICAN): >60 ML/MIN/1.73 M^2
GLUCOSE SERPL-MCNC: 122 MG/DL
GLUCOSE SERPL-MCNC: 98 MG/DL
HCT VFR BLD AUTO: 27.2 %
HGB BLD-MCNC: 9.4 G/DL
IMM GRANULOCYTES # BLD AUTO: 0.05 K/UL
IMM GRANULOCYTES NFR BLD AUTO: 0.4 %
INR PPP: 3.1
LYMPHOCYTES # BLD AUTO: 3.9 K/UL
LYMPHOCYTES NFR BLD: 34.6 %
MAGNESIUM SERPL-MCNC: 2.1 MG/DL
MCH RBC QN AUTO: 30.5 PG
MCHC RBC AUTO-ENTMCNC: 34.6 G/DL
MCV RBC AUTO: 88 FL
MONOCYTES # BLD AUTO: 1.5 K/UL
MONOCYTES NFR BLD: 13.1 %
NEUTROPHILS # BLD AUTO: 5.8 K/UL
NEUTROPHILS NFR BLD: 51.2 %
NRBC BLD-RTO: 0 /100 WBC
PHOSPHATE SERPL-MCNC: 3 MG/DL
PLATELET # BLD AUTO: 95 K/UL
PMV BLD AUTO: 10.5 FL
POTASSIUM SERPL-SCNC: 3.1 MMOL/L
POTASSIUM SERPL-SCNC: 3.8 MMOL/L
PROT SERPL-MCNC: 6.2 G/DL
PROT SERPL-MCNC: 6.3 G/DL
PROTHROMBIN TIME: 31.1 SEC
RBC # BLD AUTO: 3.08 M/UL
SODIUM SERPL-SCNC: 127 MMOL/L
SODIUM SERPL-SCNC: 127 MMOL/L
WBC # BLD AUTO: 11.35 K/UL

## 2017-12-16 PROCEDURE — 20600001 HC STEP DOWN PRIVATE ROOM

## 2017-12-16 PROCEDURE — 83735 ASSAY OF MAGNESIUM: CPT

## 2017-12-16 PROCEDURE — 25000003 PHARM REV CODE 250: Performed by: STUDENT IN AN ORGANIZED HEALTH CARE EDUCATION/TRAINING PROGRAM

## 2017-12-16 PROCEDURE — 99233 SBSQ HOSP IP/OBS HIGH 50: CPT | Mod: ,,, | Performed by: HOSPITALIST

## 2017-12-16 PROCEDURE — 85025 COMPLETE CBC W/AUTO DIFF WBC: CPT

## 2017-12-16 PROCEDURE — 63600175 PHARM REV CODE 636 W HCPCS: Performed by: HOSPITALIST

## 2017-12-16 PROCEDURE — 85610 PROTHROMBIN TIME: CPT

## 2017-12-16 PROCEDURE — 84100 ASSAY OF PHOSPHORUS: CPT

## 2017-12-16 PROCEDURE — 25000003 PHARM REV CODE 250: Performed by: HOSPITALIST

## 2017-12-16 PROCEDURE — 80053 COMPREHEN METABOLIC PANEL: CPT

## 2017-12-16 PROCEDURE — 36415 COLL VENOUS BLD VENIPUNCTURE: CPT

## 2017-12-16 PROCEDURE — 99232 SBSQ HOSP IP/OBS MODERATE 35: CPT | Mod: ,,, | Performed by: INTERNAL MEDICINE

## 2017-12-16 RX ADMIN — Medication 220 MG: at 08:12

## 2017-12-16 RX ADMIN — FUROSEMIDE 40 MG: 40 TABLET ORAL at 08:12

## 2017-12-16 RX ADMIN — MIDODRINE HYDROCHLORIDE 5 MG: 5 TABLET ORAL at 06:12

## 2017-12-16 RX ADMIN — SPIRONOLACTONE 100 MG: 100 TABLET ORAL at 08:12

## 2017-12-16 RX ADMIN — MIDODRINE HYDROCHLORIDE 5 MG: 5 TABLET ORAL at 09:12

## 2017-12-16 RX ADMIN — RIFAXIMIN 550 MG: 550 TABLET ORAL at 09:12

## 2017-12-16 RX ADMIN — LACTULOSE 30 G: 20 SOLUTION ORAL at 01:12

## 2017-12-16 RX ADMIN — MIDODRINE HYDROCHLORIDE 5 MG: 5 TABLET ORAL at 01:12

## 2017-12-16 RX ADMIN — POTASSIUM CHLORIDE 20 MEQ: 1500 TABLET, EXTENDED RELEASE ORAL at 08:12

## 2017-12-16 RX ADMIN — PHYTONADIONE 5 MG: 10 INJECTION, EMULSION INTRAMUSCULAR; INTRAVENOUS; SUBCUTANEOUS at 08:12

## 2017-12-16 RX ADMIN — PANTOPRAZOLE SODIUM 40 MG: 40 TABLET, DELAYED RELEASE ORAL at 08:12

## 2017-12-16 RX ADMIN — LACTULOSE 30 G: 20 SOLUTION ORAL at 06:12

## 2017-12-16 RX ADMIN — RIFAXIMIN 550 MG: 550 TABLET ORAL at 08:12

## 2017-12-16 NOTE — ASSESSMENT & PLAN NOTE
- on 800 cc fluid restriction; on albumin  - 116-->118-->120-->122-->123-->126-->125-->127-->131-->133-->136-->135-->132-->129-->127

## 2017-12-16 NOTE — SUBJECTIVE & OBJECTIVE
Interval History: Patient seen and examined at bedside, no acute events overnight. Will need Breast Core biopsy.    Review of Systems   Constitutional: Negative for chills and fever.   HENT: Negative for rhinorrhea and sore throat.    Eyes: Negative for pain and discharge.   Respiratory: Negative for cough and shortness of breath.    Cardiovascular: Negative for chest pain and leg swelling.   Gastrointestinal: Positive for abdominal distention. Negative for abdominal pain, blood in stool, diarrhea, nausea and vomiting.   Endocrine: Negative for cold intolerance and heat intolerance.   Genitourinary: Negative for dysuria and flank pain.   Neurological: Negative for dizziness and numbness.   Psychiatric/Behavioral: Negative for behavioral problems and confusion.     Objective:     Vital Signs (Most Recent):  Temp: 98.5 °F (36.9 °C) (12/16/17 1216)  Pulse: 101 (12/16/17 1216)  Resp: 18 (12/16/17 1216)  BP: 116/60 (12/16/17 1216)  SpO2: 100 % (12/16/17 1216) Vital Signs (24h Range):  Temp:  [97.5 °F (36.4 °C)-98.6 °F (37 °C)] 98.5 °F (36.9 °C)  Pulse:  [] 101  Resp:  [16-19] 18  SpO2:  [96 %-100 %] 100 %  BP: (116-128)/(56-72) 116/60     Weight: 69.6 kg (153 lb 7 oz)  Body mass index is 25.53 kg/m².    Intake/Output Summary (Last 24 hours) at 12/16/17 1518  Last data filed at 12/16/17 1256   Gross per 24 hour   Intake             1080 ml   Output                0 ml   Net             1080 ml      Physical Exam   Constitutional: She appears well-developed and well-nourished.   HENT:   Head: Normocephalic and atraumatic.   Eyes: Conjunctivae are normal. Pupils are equal, round, and reactive to light. Scleral icterus is present.   Neck: Normal range of motion. No thyromegaly present.   Cardiovascular: Normal rate, regular rhythm and normal heart sounds.    Pulmonary/Chest: Effort normal and breath sounds normal.   Abdominal: Soft. She exhibits distension. There is no tenderness.   Musculoskeletal: Normal range of  motion. She exhibits no edema.   Neurological: She is alert.   AAO times 2   Skin: No erythema. No pallor.       MELD-Na score: 35 at 12/16/2017  4:58 AM  MELD score: 32 at 12/16/2017  4:58 AM  Calculated from:  Serum Creatinine: 0.9 mg/dL (Rounded to 1) at 12/16/2017  4:58 AM  Serum Sodium: 127 mmol/L at 12/16/2017  4:58 AM  Total Bilirubin: 27.8 mg/dL at 12/16/2017  4:58 AM  INR(ratio): 3.1 at 12/16/2017  4:58 AM  Age: 52 years    Significant Labs:  CBC:    Recent Labs  Lab 12/15/17  0517 12/16/17 0458   WBC 11.76 11.35   HGB 9.1* 9.4*   HCT 26.6* 27.2*   PLT 78* 95*     CMP:    Recent Labs  Lab 12/15/17  0517 12/15/17  1527 12/16/17 0458   * 129* 127*   K 3.6 3.1* 3.8   CL 95 92* 92*   CO2 23 26 24   GLU 89 136* 98   BUN 9 9 9   CREATININE 0.8 0.9 0.9   CALCIUM 9.0 9.3 9.0   PROT 5.8* 6.7 6.3   ALBUMIN 3.1* 3.3* 3.1*   BILITOT 25.6* 29.8* 27.8*   ALKPHOS 184* 215* 201*   AST 55* 58* 62*   ALT 42 49* 48*   ANIONGAP 11 11 11   EGFRNONAA >60.0 >60.0 >60.0     PTINR:    Recent Labs  Lab 12/15/17  0517 12/16/17 0458   INR 3.3* 3.1*       Significant Procedures:   Dobutamine Stress Test with Color Flow:   Results for orders placed or performed during the hospital encounter of 10/19/17   Dobutamine Stress Test w/ Color Flow   Result Value Ref Range    EF 73 55 - 65    Diastolic Dysfunction No     Est. PA Systolic Pressure 21.32     Tricuspid Valve Regurgitation TRIVIAL

## 2017-12-16 NOTE — PLAN OF CARE
Problem: Patient Care Overview  Goal: Plan of Care Review  Outcome: Ongoing (interventions implemented as appropriate)  Pt AAOx4, drowsy throughout the day. Pt had 3 + BMs today. Mag and potassium replaced throughout the day. Pt up with assist x 1.  at bedside. Pt taking lactulose. Swallows pills with no difficulty, 1 at a time. Pt's abdomen distended but no c/o pain. AVSS. I&Os charted in flowsheets. POC reviewed with pt and .

## 2017-12-16 NOTE — PLAN OF CARE
Problem: Patient Care Overview  Goal: Plan of Care Review  Outcome: Ongoing (interventions implemented as appropriate)  No acute events overnight. Patient remains disoriented to time, with some intermittent confusion. No complaints of pain. Patient ambulating with walker. 4 BMs overnight. Biopsy of the breast planned for Monday, before liver transplant workup can begin. Safety precautions maintained throughout the shift, patient remains free of falls/ injury.

## 2017-12-16 NOTE — PROGRESS NOTES
[facility]  Hepatology  Progress Note    Patient Name: Marcie Bazan  MRN: [id number]  Admission Date: 11/23/2017  Hospital Length of Stay: 23 days  Attending Provider: Surinder Madrigal MD   Primary Care Physician: ODILIA Wall  Principal Problem:Decompensated hepatic cirrhosis    Subjective:     Transplant status: Pre-transplant    HPI: 52 year old female with PMHx of ESLD 2/2 EtOH hepatitis and essential HTN who presents to [facility] as a direct transfer from [facility] for evaluation of decompensated liver cirrhosis. Patient presented to [facility] with generalized weakness and was admitted to  11/10 for hyponatremia seen on labs in GI office. Discharged on 11/15 and returned on 11/20 with 1 week worsening weakness, malaise, lethargy, and decreased appetite. Denied fever/chills, abdominal pain, bright red or black tarry stools, hematemesis or other abnormal bleeding, confusion or disorientation, changes in urination, light-headedness, headache, changes in vision, dyspnea, chest pain or palpitations. Labs demonstrated Na 116 and MELD 32; patient admitted for hyponatremia. Nephrology consulted, recommended fluid restriction , holding diuretics, and prednisode taper that started 11/23 (decreased from 40mg QD to 20mg Qd.) Patient transferred to [facility] for revaluation of liver disease. Of note, paracentesis performed 11/22, removed 2L, negative for SBP.     Follows with Dr. Souza for cirrhosis. Since early 09/2017, patient's liver failure progressing indicated by worsening clinical signs (abdominal distention, weakness/fatigue) and MELD. She had been evaluated by [facility] liver transplant committee 10/25/2017 and declined transplant candidacy 2/2 continued EtOH use and lack of solid caregiver support/plan.She has since been enrolled in in-patient substance abuse program at [facility] where she has remained since early November; per patient, her last EtOH beverage  "was 11/01/2017.  Per chart review on 11/22/17:     "Patient's case was discussed in liver selection committee today. Per committee discussion, the SW will call patient's daughter to discuss caregiver plan at this time. If the caregiver plan is adequate, patient will need to follow up with either/both addiction psych or SW regarding substance abuse and plan. May need to do this inpatient if patient is still admitted."        Denies history of GI bleed, SBP, renal disease, or other complications of cirrhosis. Liver biopsy 10/24 demonstrated stage 4 cirrhosis. Records reports EGD in August 2016 that showed small esophageal varices with severe gastritis. Colonoscopy 2015 with 2 polyps and internal hemorrhoids Started on prednisone 40mg QD on 10/25; was on spironolactone, has been held since 11/10 for hypoNa; on lactulose.    Interval History:   No acute events overnight, patient resting comfortably this morning with no complaints.    Current Facility-Administered Medications   Medication    GI cocktail (mylanta 30 mL, lidocaine 2 % viscous 10 mL, dicyclomine 10 mL) 50 mL    lactulose 20 gram/30 mL solution Soln 30 g    midodrine tablet 5 mg    ondansetron disintegrating tablet 4 mg    ondansetron injection 4 mg    pantoprazole EC tablet 40 mg    phytonadione vitamin k (AQUA-MEPHYTON) 5 mg in dextrose 5 % 50 mL IVPB    potassium chloride SA CR tablet 20 mEq    rifAXIMin tablet 550 mg    simethicone chewable tablet 80 mg    sodium chloride 0.9% flush 3 mL    sodium chloride 0.9% flush 3 mL    sodium chloride 0.9% flush 5 mL    zinc sulfate capsule 220 mg       Objective:     Vital Signs (Most Recent):  Temp: 98.5 °F (36.9 °C) (12/16/17 1216)  Pulse: 101 (12/16/17 1216)  Resp: 18 (12/16/17 1216)  BP: 116/60 (12/16/17 1216)  SpO2: 100 % (12/16/17 1216) Vital Signs (24h Range):  Temp:  [97.5 °F (36.4 °C)-98.6 °F (37 °C)] 98.5 °F (36.9 °C)  Pulse:  [] 101  Resp:  [16-19] 18  SpO2:  [96 %-100 %] 100 %  BP: " (116-128)/(56-72) 116/60     Weight: 69.6 kg (153 lb 7 oz) (12/12/17 0700)  Body mass index is 25.53 kg/m².    Physical Exam   Constitutional: She is oriented to person, place, and time. No distress.   HENT:   Head: Normocephalic and atraumatic.   Eyes: No scleral icterus.   Neck: Normal range of motion.   Cardiovascular: Regular rhythm and normal heart sounds.    Pulmonary/Chest: Effort normal and breath sounds normal.   Abdominal: Soft. Bowel sounds are normal. She exhibits distension. She exhibits no mass. There is no tenderness. There is no rebound and no guarding. No hernia.   Musculoskeletal: Normal range of motion. She exhibits edema.   Neurological: She is alert and oriented to person, place, and time.   Skin: She is not diaphoretic.       MELD-Na score: 35 at 12/16/2017  4:58 AM  MELD score: 32 at 12/16/2017  4:58 AM  Calculated from:  Serum Creatinine: 0.9 mg/dL (Rounded to 1) at 12/16/2017  4:58 AM  Serum Sodium: 127 mmol/L at 12/16/2017  4:58 AM  Total Bilirubin: 27.8 mg/dL at 12/16/2017  4:58 AM  INR(ratio): 3.1 at 12/16/2017  4:58 AM  Age: 52 years    Significant Labs:  CBC:   Recent Labs  Lab 12/16/17  0458   WBC 11.35   RBC 3.08*   HGB 9.4*   HCT 27.2*   PLT 95*     CMP:   Recent Labs  Lab 12/16/17 0458   GLU 98   CALCIUM 9.0   ALBUMIN 3.1*   PROT 6.3   *   K 3.8   CO2 24   CL 92*   BUN 9   CREATININE 0.9   ALKPHOS 201*   ALT 48*   AST 62*   BILITOT 27.8*     Coagulation:   Recent Labs  Lab 12/16/17 0458   INR 3.1*       Significant Imaging:  None    Assessment/Plan:     * Decompensated hepatic cirrhosis    52 year old female with a history of ESLD 2/2 to alcoholic hepatitis who decompensated while being in rehab. She has completed the inpatient evaluation and approved for listing based on breast core biopsy schedule for Monday. Currently on Vitamin K IV and will need FFP on Monday morning.    Sodium trending down and diuretics stopped. Otherwise Cr, LFT's, and H/H remain  stable.    Recommendations  -Approved for listing pending core needle breast biopsy  -IV VitK over weekend ; FFP Monday morning for goal INR < 2  -Core biopsy breast on Monday  -Repeat CXR and UA  -1.5 L fluid restriction  -Discontinue diuretics (done)  -Continue empiric fluconazole  -Continue lactulose and rifaximin and zinc  -Continue PPI and PRN GI cocktail                   Thank you for your consult. I will follow-up with patient. Please contact us if you have any additional questions.    Flor Rivero M.D.  Gastroenterology Fellow, PGY-IV  Pager: [phone]  [facility]

## 2017-12-16 NOTE — PROGRESS NOTES
[facility] Hospital Medicine  Progress Note    Patient Name: Marcie Bazan  MRN: [id number]  Patient Class: IP- Inpatient   Admission Date: 11/23/2017  Length of Stay: 23 days  Expected Discharge Date: 12/22/2017  Attending Physician: Surinder Madrigal MD  Primary Care Provider: Milton Ferrer, [facility] Medicine Team: [facility] HOSP MED A Surinder Madrigal MD  Principal Problem:Decompensated hepatic cirrhosis    Subjective:     HPI:   52 year old female with PMHx of 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 Patient presented to [facility] with generalized weakness. Onset two months ago, progressively worsening. Was admitted to  11/10 for hyponatremia seen on labs in GI office. Discharged on 11/15 and returned on 11/20 with 1 week worsening weakness, malaise, lethargy, and decreased appetite. Denied fever/chills, abdominal pain, bright red or black tarry stools, hematemesis or other abnormal bleeding, confusion or disorientation, changes in urination, light-headedness, headache, changes in vision, dyspnea, chest pain or palpitations. Labs demonstrated Na 116 and MELD 32; patient admitted for hyponatremia. Nephrology consulted, recommended fluid restriction , holding diuretics, and prednisode taper that started 11/23 (decreased from 40mg QD to 20mg Qd.) Patient transferred to [facility] for revaluation of liver disease. Of note, paracentesis performed 11/22, removed 2L, negative for SBP.    Follows with Dr. Souza for cirrhosis. Since early 09/2017, patient's liver failure progressing indicated by worsening clinical signs (abdominal distention, weakness/fatigue) and MELD. In mid-October, patient's PETH demonstrated EtOH use and she was denied candidacy for liver transplant. She was then enrolled in in-patient substance abuse program at [facility]  where she has remained since early November; per patient, her last EtOH beverage was 11/01/2017. Denies history of GI bleed, SBP, renal disease, or other complications of cirrhosis. Liver biopsy 10/24 demonstrated stage 4 cirrhosis. Records reports EGD in August 2016 that showed small esophageal varices with severe gastritis. Colonoscopy 2015 with 2 polyps and internal hemorrhoids Started on prednisone 40mg QD on 10/25; was on spironolactone, has been held since 11/10 for hypoNa; on lactulose.    Hospital Course:  Mental status improving with hepatic encephalopathy treatment. Started empiric ceftriaxone given leukocytosis in immunocompromised state. CMV PCR positive overnight, for which ID was consulted, recommended repeating titers in 1 week. Seen by Psychiatry and  who determined she was moderate risk for Liver transplant. Will proceed with EGD/Cscope and mammogram after FFP. Colonoscopy not performed due to poor prep, EGD revealed large varices which were banded. IR paracentesis ordered after patient started complaining of abdominal pain. Mammogram performed, IR paracentesis after INR Reversal negative for SBP. Patient accepted for Liver transplant pending review of previous mammograms.  Patient was slightly confused and altered, which resolved with lactulose enemas. She will need a core Breast Biopsy prior to listing in order to rule out malignancy.       Interval History: Patient seen and examined at bedside, no acute events overnight. Will need Breast Core biopsy.    Review of Systems   Constitutional: Negative for chills and fever.   HENT: Negative for rhinorrhea and sore throat.    Eyes: Negative for pain and discharge.   Respiratory: Negative for cough and shortness of breath.    Cardiovascular: Negative for chest pain and leg swelling.   Gastrointestinal: Positive for abdominal distention. Negative for abdominal pain, blood in stool, diarrhea, nausea and vomiting.   Endocrine: Negative for  cold intolerance and heat intolerance.   Genitourinary: Negative for dysuria and flank pain.   Neurological: Negative for dizziness and numbness.   Psychiatric/Behavioral: Negative for behavioral problems and confusion.     Objective:     Vital Signs (Most Recent):  Temp: 98.5 °F (36.9 °C) (12/16/17 1216)  Pulse: 101 (12/16/17 1216)  Resp: 18 (12/16/17 1216)  BP: 116/60 (12/16/17 1216)  SpO2: 100 % (12/16/17 1216) Vital Signs (24h Range):  Temp:  [97.5 °F (36.4 °C)-98.6 °F (37 °C)] 98.5 °F (36.9 °C)  Pulse:  [] 101  Resp:  [16-19] 18  SpO2:  [96 %-100 %] 100 %  BP: (116-128)/(56-72) 116/60     Weight: 69.6 kg (153 lb 7 oz)  Body mass index is 25.53 kg/m².    Intake/Output Summary (Last 24 hours) at 12/16/17 1518  Last data filed at 12/16/17 1256   Gross per 24 hour   Intake             1080 ml   Output                0 ml   Net             1080 ml      Physical Exam   Constitutional: She appears well-developed and well-nourished.   HENT:   Head: Normocephalic and atraumatic.   Eyes: Conjunctivae are normal. Pupils are equal, round, and reactive to light. Scleral icterus is present.   Neck: Normal range of motion. No thyromegaly present.   Cardiovascular: Normal rate, regular rhythm and normal heart sounds.    Pulmonary/Chest: Effort normal and breath sounds normal.   Abdominal: Soft. She exhibits distension. There is no tenderness.   Musculoskeletal: Normal range of motion. She exhibits no edema.   Neurological: She is alert.   AAO times 2   Skin: No erythema. No pallor.       MELD-Na score: 35 at 12/16/2017  4:58 AM  MELD score: 32 at 12/16/2017  4:58 AM  Calculated from:  Serum Creatinine: 0.9 mg/dL (Rounded to 1) at 12/16/2017  4:58 AM  Serum Sodium: 127 mmol/L at 12/16/2017  4:58 AM  Total Bilirubin: 27.8 mg/dL at 12/16/2017  4:58 AM  INR(ratio): 3.1 at 12/16/2017  4:58 AM  Age: 52 years    Significant Labs:  CBC:    Recent Labs  Lab 12/15/17  0517 12/16/17  0458   WBC 11.76 11.35   HGB 9.1* 9.4*   HCT  26.6* 27.2*   PLT 78* 95*     CMP:    Recent Labs  Lab 12/15/17  0517 12/15/17  1527 12/16/17  0458   * 129* 127*   K 3.6 3.1* 3.8   CL 95 92* 92*   CO2 23 26 24   GLU 89 136* 98   BUN 9 9 9   CREATININE 0.8 0.9 0.9   CALCIUM 9.0 9.3 9.0   PROT 5.8* 6.7 6.3   ALBUMIN 3.1* 3.3* 3.1*   BILITOT 25.6* 29.8* 27.8*   ALKPHOS 184* 215* 201*   AST 55* 58* 62*   ALT 42 49* 48*   ANIONGAP 11 11 11   EGFRNONAA >60.0 >60.0 >60.0     PTINR:    Recent Labs  Lab 12/15/17  0517 12/16/17 0458   INR 3.3* 3.1*       Significant Procedures:   Dobutamine Stress Test with Color Flow:   Results for orders placed or performed during the hospital encounter of 10/19/17   Dobutamine Stress Test w/ Color Flow   Result Value Ref Range    EF 73 55 - 65    Diastolic Dysfunction No     Est. PA Systolic Pressure 21.32     Tricuspid Valve Regurgitation TRIVIAL             Assessment / Plan:     * Decompensated hepatic cirrhosis    MELD-Na score: 35 at 12/16/2017  4:58 AM  MELD score: 32 at 12/16/2017  4:58 AM  Calculated from:  Serum Creatinine: 0.9 mg/dL (Rounded to 1) at 12/16/2017  4:58 AM  Serum Sodium: 127 mmol/L at 12/16/2017  4:58 AM  Total Bilirubin: 27.8 mg/dL at 12/16/2017  4:58 AM  INR(ratio): 3.1 at 12/16/2017  4:58 AM  Age: 52 years   - had half of her evaluation completed on last admission  - AS per Psychiatry patient is moderate risk, will proceed with Liver transplant evaluation  - EGD performed, esophageal varices banded,   - Mammogram performed on 12/4/17   - C-scope repeated on 12/5- good prep  - accepted for liver transplant pending radiology review of old and new mammogram         Transplant donor evaluation    Presented at Liver selection committee on 12/06/17, approved pending review of previous mammograms           Immunosuppressed status    On chronic prednisone for alcoholic hepatitis; currently 15 mg, would go down by 5 weekly, next change would be 10 mg this saturday          Coagulopathy    INR 3.0  IV Vitamin K  to reverse Coagulopathy prior to Breast Biopsy           Alcoholic cirrhosis of liver with ascites    - see decompensated cirrhosis   - IR paracentesis on 12/12 removed 5 liters         Cytomegalovirus (CMV) viremia    -- ID evaluated and do not feel it needs to be treated        Thrombocytopenia    -- monitor           Abnormal liver enzymes    -- Stable  -- Etiology liver failure; see that section for more details          Alcoholic hepatitis with ascites- decompensated with elevated MELD    - cont tapering prednisone, currently at 5 mg daily         Hyponatremia    - on 800 cc fluid restriction; on albumin  - 116-->118-->120-->122-->123-->126-->125-->127-->131-->133-->136-->135-->132-->129-->127        End stage liver disease              HCC (hepatocellular carcinoma)    AFP 9; seen on CT on 10/19, 1 cm focus, monitor for now          Hepatic encephalopathy    -- mental status improved considerably after lactulose enemas         Severe protein-calorie malnutrition    PAB, boost and dietary consult          Ascites    -- Holding diuretics at present, s/p 2L removed on 11/22 with testing negative for SBP  -- No abdominal pain today    - IR paracentesis on 12/12/17 removed 5.2 liters               Essential hypertension    - Stable  - Holding home lisinopril in setting of decompensated liver disease          Anemia of chronic disease    - monitor daily                VTE Risk Mitigation         Ordered     Place sequential compression device  Until discontinued      11/24/17 0734     Place FABIOLA hose  Until discontinued      11/23/17 2305     Medium Risk of VTE  Once      11/23/17 2305           Discharge Disposition: pending liver transplant     Time taken to evaluate, plan, and coordinate patient care: 35 minutes.    Surinder Madrigal MD  Department of Hospital Medicine  [facility]

## 2017-12-16 NOTE — ASSESSMENT & PLAN NOTE
MELD-Na score: 35 at 12/16/2017  4:58 AM  MELD score: 32 at 12/16/2017  4:58 AM  Calculated from:  Serum Creatinine: 0.9 mg/dL (Rounded to 1) at 12/16/2017  4:58 AM  Serum Sodium: 127 mmol/L at 12/16/2017  4:58 AM  Total Bilirubin: 27.8 mg/dL at 12/16/2017  4:58 AM  INR(ratio): 3.1 at 12/16/2017  4:58 AM  Age: 52 years   - had half of her evaluation completed on last admission  - AS per Psychiatry patient is moderate risk, will proceed with Liver transplant evaluation  - EGD performed, esophageal varices banded,   - Mammogram performed on 12/4/17   - C-scope repeated on 12/5- good prep  - accepted for liver transplant pending radiology review of old and new mammogram

## 2017-12-16 NOTE — SUBJECTIVE & OBJECTIVE
Interval History:   No acute events overnight, patient resting comfortably this morning with no complaints.    Current Facility-Administered Medications   Medication    GI cocktail (mylanta 30 mL, lidocaine 2 % viscous 10 mL, dicyclomine 10 mL) 50 mL    lactulose 20 gram/30 mL solution Soln 30 g    midodrine tablet 5 mg    ondansetron disintegrating tablet 4 mg    ondansetron injection 4 mg    pantoprazole EC tablet 40 mg    phytonadione vitamin k (AQUA-MEPHYTON) 5 mg in dextrose 5 % 50 mL IVPB    potassium chloride SA CR tablet 20 mEq    rifAXIMin tablet 550 mg    simethicone chewable tablet 80 mg    sodium chloride 0.9% flush 3 mL    sodium chloride 0.9% flush 3 mL    sodium chloride 0.9% flush 5 mL    zinc sulfate capsule 220 mg       Objective:     Vital Signs (Most Recent):  Temp: 98.5 °F (36.9 °C) (12/16/17 1216)  Pulse: 101 (12/16/17 1216)  Resp: 18 (12/16/17 1216)  BP: 116/60 (12/16/17 1216)  SpO2: 100 % (12/16/17 1216) Vital Signs (24h Range):  Temp:  [97.5 °F (36.4 °C)-98.6 °F (37 °C)] 98.5 °F (36.9 °C)  Pulse:  [] 101  Resp:  [16-19] 18  SpO2:  [96 %-100 %] 100 %  BP: (116-128)/(56-72) 116/60     Weight: 69.6 kg (153 lb 7 oz) (12/12/17 0700)  Body mass index is 25.53 kg/m².    Physical Exam   Constitutional: She is oriented to person, place, and time. No distress.   HENT:   Head: Normocephalic and atraumatic.   Eyes: No scleral icterus.   Neck: Normal range of motion.   Cardiovascular: Regular rhythm and normal heart sounds.    Pulmonary/Chest: Effort normal and breath sounds normal.   Abdominal: Soft. Bowel sounds are normal. She exhibits distension. She exhibits no mass. There is no tenderness. There is no rebound and no guarding. No hernia.   Musculoskeletal: Normal range of motion. She exhibits edema.   Neurological: She is alert and oriented to person, place, and time.   Skin: She is not diaphoretic.       MELD-Na score: 35 at 12/16/2017  4:58 AM  MELD score: 32 at 12/16/2017   4:58 AM  Calculated from:  Serum Creatinine: 0.9 mg/dL (Rounded to 1) at 12/16/2017  4:58 AM  Serum Sodium: 127 mmol/L at 12/16/2017  4:58 AM  Total Bilirubin: 27.8 mg/dL at 12/16/2017  4:58 AM  INR(ratio): 3.1 at 12/16/2017  4:58 AM  Age: 52 years    Significant Labs:  CBC:   Recent Labs  Lab 12/16/17 0458   WBC 11.35   RBC 3.08*   HGB 9.4*   HCT 27.2*   PLT 95*     CMP:   Recent Labs  Lab 12/16/17 0458   GLU 98   CALCIUM 9.0   ALBUMIN 3.1*   PROT 6.3   *   K 3.8   CO2 24   CL 92*   BUN 9   CREATININE 0.9   ALKPHOS 201*   ALT 48*   AST 62*   BILITOT 27.8*     Coagulation:   Recent Labs  Lab 12/16/17 0458   INR 3.1*       Significant Imaging:  None

## 2017-12-17 LAB
ALBUMIN SERPL BCP-MCNC: 3.1 G/DL
ALBUMIN SERPL BCP-MCNC: 3.1 G/DL
ALP SERPL-CCNC: 214 U/L
ALP SERPL-CCNC: 222 U/L
ALT SERPL W/O P-5'-P-CCNC: 49 U/L
ALT SERPL W/O P-5'-P-CCNC: 52 U/L
ANION GAP SERPL CALC-SCNC: 12 MMOL/L
ANION GAP SERPL CALC-SCNC: 12 MMOL/L
ANISOCYTOSIS BLD QL SMEAR: SLIGHT
AST SERPL-CCNC: 63 U/L
AST SERPL-CCNC: 73 U/L
BASOPHILS # BLD AUTO: 0.05 K/UL
BASOPHILS NFR BLD: 0.5 %
BILIRUB SERPL-MCNC: 30.8 MG/DL
BILIRUB SERPL-MCNC: 32.8 MG/DL
BLD PROD TYP BPU: NORMAL
BLD PROD TYP BPU: NORMAL
BLOOD UNIT EXPIRATION DATE: NORMAL
BLOOD UNIT EXPIRATION DATE: NORMAL
BLOOD UNIT TYPE CODE: 600
BLOOD UNIT TYPE CODE: 6200
BLOOD UNIT TYPE: NORMAL
BLOOD UNIT TYPE: NORMAL
BUN SERPL-MCNC: 11 MG/DL
BUN SERPL-MCNC: 13 MG/DL
CALCIUM SERPL-MCNC: 8.8 MG/DL
CALCIUM SERPL-MCNC: 9.1 MG/DL
CHLORIDE SERPL-SCNC: 90 MMOL/L
CHLORIDE SERPL-SCNC: 91 MMOL/L
CO2 SERPL-SCNC: 23 MMOL/L
CO2 SERPL-SCNC: 23 MMOL/L
CODING SYSTEM: NORMAL
CODING SYSTEM: NORMAL
CREAT SERPL-MCNC: 0.7 MG/DL
CREAT SERPL-MCNC: 0.8 MG/DL
DIFFERENTIAL METHOD: ABNORMAL
DISPENSE STATUS: NORMAL
DISPENSE STATUS: NORMAL
EOSINOPHIL # BLD AUTO: 0 K/UL
EOSINOPHIL NFR BLD: 0.2 %
ERYTHROCYTE [DISTWIDTH] IN BLOOD BY AUTOMATED COUNT: 19.6 %
EST. GFR  (AFRICAN AMERICAN): >60 ML/MIN/1.73 M^2
EST. GFR  (AFRICAN AMERICAN): >60 ML/MIN/1.73 M^2
EST. GFR  (NON AFRICAN AMERICAN): >60 ML/MIN/1.73 M^2
EST. GFR  (NON AFRICAN AMERICAN): >60 ML/MIN/1.73 M^2
GLUCOSE SERPL-MCNC: 113 MG/DL
GLUCOSE SERPL-MCNC: 95 MG/DL
HCT VFR BLD AUTO: 28.3 %
HGB BLD-MCNC: 9.9 G/DL
IMM GRANULOCYTES # BLD AUTO: 0.06 K/UL
IMM GRANULOCYTES NFR BLD AUTO: 0.6 %
INR PPP: 3.2
LYMPHOCYTES # BLD AUTO: 2.9 K/UL
LYMPHOCYTES NFR BLD: 27.8 %
MAGNESIUM SERPL-MCNC: 1.4 MG/DL
MCH RBC QN AUTO: 31.6 PG
MCHC RBC AUTO-ENTMCNC: 35 G/DL
MCV RBC AUTO: 90 FL
MONOCYTES # BLD AUTO: 1.4 K/UL
MONOCYTES NFR BLD: 13.1 %
NEUTROPHILS # BLD AUTO: 6 K/UL
NEUTROPHILS NFR BLD: 57.8 %
NRBC BLD-RTO: 0 /100 WBC
NUM UNITS TRANS FFP: NORMAL
NUM UNITS TRANS FFP: NORMAL
OVALOCYTES BLD QL SMEAR: ABNORMAL
PHOSPHATE SERPL-MCNC: 3 MG/DL
PLATELET # BLD AUTO: 83 K/UL
PLATELET BLD QL SMEAR: ABNORMAL
PMV BLD AUTO: 11.6 FL
POIKILOCYTOSIS BLD QL SMEAR: SLIGHT
POTASSIUM SERPL-SCNC: 3.2 MMOL/L
POTASSIUM SERPL-SCNC: 3.9 MMOL/L
PROT SERPL-MCNC: 6.4 G/DL
PROT SERPL-MCNC: 6.6 G/DL
PROTHROMBIN TIME: 32.4 SEC
RBC # BLD AUTO: 3.13 M/UL
SODIUM SERPL-SCNC: 125 MMOL/L
SODIUM SERPL-SCNC: 126 MMOL/L
TARGETS BLD QL SMEAR: ABNORMAL
WBC # BLD AUTO: 10.42 K/UL

## 2017-12-17 PROCEDURE — 85025 COMPLETE CBC W/AUTO DIFF WBC: CPT

## 2017-12-17 PROCEDURE — 84100 ASSAY OF PHOSPHORUS: CPT

## 2017-12-17 PROCEDURE — P9017 PLASMA 1 DONOR FRZ W/IN 8 HR: HCPCS

## 2017-12-17 PROCEDURE — 80053 COMPREHEN METABOLIC PANEL: CPT | Mod: 91

## 2017-12-17 PROCEDURE — 99233 SBSQ HOSP IP/OBS HIGH 50: CPT | Mod: ,,, | Performed by: HOSPITALIST

## 2017-12-17 PROCEDURE — 63600175 PHARM REV CODE 636 W HCPCS: Performed by: HOSPITALIST

## 2017-12-17 PROCEDURE — 36430 TRANSFUSION BLD/BLD COMPNT: CPT

## 2017-12-17 PROCEDURE — 36415 COLL VENOUS BLD VENIPUNCTURE: CPT

## 2017-12-17 PROCEDURE — 85610 PROTHROMBIN TIME: CPT

## 2017-12-17 PROCEDURE — 20600001 HC STEP DOWN PRIVATE ROOM

## 2017-12-17 PROCEDURE — 25000003 PHARM REV CODE 250: Performed by: STUDENT IN AN ORGANIZED HEALTH CARE EDUCATION/TRAINING PROGRAM

## 2017-12-17 PROCEDURE — 99233 SBSQ HOSP IP/OBS HIGH 50: CPT | Mod: ,,, | Performed by: INTERNAL MEDICINE

## 2017-12-17 PROCEDURE — 83735 ASSAY OF MAGNESIUM: CPT

## 2017-12-17 PROCEDURE — 25000003 PHARM REV CODE 250: Performed by: HOSPITALIST

## 2017-12-17 RX ORDER — POTASSIUM CHLORIDE 20 MEQ/15ML
60 SOLUTION ORAL
Status: COMPLETED | OUTPATIENT
Start: 2017-12-17 | End: 2017-12-17

## 2017-12-17 RX ORDER — HYDROCODONE BITARTRATE AND ACETAMINOPHEN 500; 5 MG/1; MG/1
TABLET ORAL
Status: DISCONTINUED | OUTPATIENT
Start: 2017-12-17 | End: 2017-12-19

## 2017-12-17 RX ADMIN — LACTULOSE 30 G: 20 SOLUTION ORAL at 05:12

## 2017-12-17 RX ADMIN — LACTULOSE 30 G: 20 SOLUTION ORAL at 02:12

## 2017-12-17 RX ADMIN — PANTOPRAZOLE SODIUM 40 MG: 40 TABLET, DELAYED RELEASE ORAL at 10:12

## 2017-12-17 RX ADMIN — MIDODRINE HYDROCHLORIDE 5 MG: 5 TABLET ORAL at 09:12

## 2017-12-17 RX ADMIN — Medication 220 MG: at 11:12

## 2017-12-17 RX ADMIN — RIFAXIMIN 550 MG: 550 TABLET ORAL at 10:12

## 2017-12-17 RX ADMIN — PHYTONADIONE 5 MG: 10 INJECTION, EMULSION INTRAMUSCULAR; INTRAVENOUS; SUBCUTANEOUS at 10:12

## 2017-12-17 RX ADMIN — RIFAXIMIN 550 MG: 550 TABLET ORAL at 09:12

## 2017-12-17 RX ADMIN — POTASSIUM CHLORIDE 60 MEQ: 20 SOLUTION ORAL at 05:12

## 2017-12-17 RX ADMIN — POTASSIUM CHLORIDE 20 MEQ: 1500 TABLET, EXTENDED RELEASE ORAL at 10:12

## 2017-12-17 RX ADMIN — ONDANSETRON 4 MG: 2 INJECTION INTRAMUSCULAR; INTRAVENOUS at 08:12

## 2017-12-17 RX ADMIN — MIDODRINE HYDROCHLORIDE 5 MG: 5 TABLET ORAL at 02:12

## 2017-12-17 RX ADMIN — MIDODRINE HYDROCHLORIDE 5 MG: 5 TABLET ORAL at 05:12

## 2017-12-17 RX ADMIN — POTASSIUM CHLORIDE 60 MEQ: 20 SOLUTION ORAL at 07:12

## 2017-12-17 NOTE — SUBJECTIVE & OBJECTIVE
Interval History:   No acute events overnight.  She complains of increasing abdominal distension and wanting to have a paracentesis.     Current Facility-Administered Medications   Medication    GI cocktail (mylanta 30 mL, lidocaine 2 % viscous 10 mL, dicyclomine 10 mL) 50 mL    lactulose 20 gram/30 mL solution Soln 30 g    midodrine tablet 5 mg    ondansetron disintegrating tablet 4 mg    ondansetron injection 4 mg    pantoprazole EC tablet 40 mg    phytonadione vitamin k (AQUA-MEPHYTON) 5 mg in dextrose 5 % 50 mL IVPB    potassium chloride SA CR tablet 20 mEq    rifAXIMin tablet 550 mg    simethicone chewable tablet 80 mg    sodium chloride 0.9% flush 3 mL    sodium chloride 0.9% flush 3 mL    sodium chloride 0.9% flush 5 mL    zinc sulfate capsule 220 mg       Objective:     Vital Signs (Most Recent):  Temp: 98.6 °F (37 °C) (12/17/17 1252)  Pulse: 107 (12/17/17 1252)  Resp: 18 (12/17/17 1252)  BP: (!) 114/58 (12/17/17 1252)  SpO2: 96 % (12/17/17 1252) Vital Signs (24h Range):  Temp:  [97.8 °F (36.6 °C)-98.8 °F (37.1 °C)] 98.6 °F (37 °C)  Pulse:  [] 107  Resp:  [16-18] 18  SpO2:  [96 %-99 %] 96 %  BP: (109-124)/(57-68) 114/58     Weight: 69.6 kg (153 lb 7 oz) (12/12/17 0700)  Body mass index is 25.53 kg/m².    Physical Exam   Constitutional: She is oriented to person, place, and time. No distress.   HENT:   Head: Normocephalic and atraumatic.   Eyes: No scleral icterus.   Neck: Normal range of motion.   Cardiovascular: Regular rhythm and normal heart sounds.    Pulmonary/Chest: Effort normal and breath sounds normal.   Abdominal: Soft. Bowel sounds are normal. She exhibits distension. She exhibits no mass. There is no tenderness. There is no rebound and no guarding. No hernia.   Musculoskeletal: Normal range of motion. She exhibits edema.   Neurological: She is alert and oriented to person, place, and time.   Skin: She is not diaphoretic.       MELD-Na score: 35 at 12/17/2017  4:36 AM  MELD  score: 32 at 12/17/2017  4:36 AM  Calculated from:  Serum Creatinine: 0.8 mg/dL (Rounded to 1) at 12/17/2017  4:36 AM  Serum Sodium: 125 mmol/L at 12/17/2017  4:36 AM  Total Bilirubin: 30.8 mg/dL at 12/17/2017  4:36 AM  INR(ratio): 3.2 at 12/17/2017  4:36 AM  Age: 52 years    Significant Labs:  CBC:     Recent Labs  Lab 12/17/17 0436   WBC 10.42   RBC 3.13*   HGB 9.9*   HCT 28.3*   PLT 83*     CMP:     Recent Labs  Lab 12/17/17 0436   GLU 95   CALCIUM 9.1   ALBUMIN 3.1*   PROT 6.4   *   K 3.2*   CO2 23   CL 90*   BUN 11   CREATININE 0.8   ALKPHOS 214*   ALT 49*   AST 63*   BILITOT 30.8*     Coagulation:     Recent Labs  Lab 12/17/17 0436   INR 3.2*       Significant Imaging:  None

## 2017-12-17 NOTE — PROGRESS NOTES
[facility] Hospital Medicine  Progress Note    Patient Name: Marcie Bazan  MRN: [id number]  Patient Class: IP- Inpatient   Admission Date: 11/23/2017  Length of Stay: 24 days  Expected Discharge Date: 12/22/2017  Attending Physician: Surinder Madrigal MD  Primary Care Provider: Milton Ferrer, [facility] Medicine Team: [facility] HOSP MED A Surinder Madrigal MD  Principal Problem:Decompensated hepatic cirrhosis    Subjective:     HPI:   52 year old female with PMHx of 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 Patient presented to [facility] with generalized weakness. Onset two months ago, progressively worsening. Was admitted to  11/10 for hyponatremia seen on labs in GI office. Discharged on 11/15 and returned on 11/20 with 1 week worsening weakness, malaise, lethargy, and decreased appetite. Denied fever/chills, abdominal pain, bright red or black tarry stools, hematemesis or other abnormal bleeding, confusion or disorientation, changes in urination, light-headedness, headache, changes in vision, dyspnea, chest pain or palpitations. Labs demonstrated Na 116 and MELD 32; patient admitted for hyponatremia. Nephrology consulted, recommended fluid restriction , holding diuretics, and prednisode taper that started 11/23 (decreased from 40mg QD to 20mg Qd.) Patient transferred to [facility] for revaluation of liver disease. Of note, paracentesis performed 11/22, removed 2L, negative for SBP.    Follows with Dr. Souza for cirrhosis. Since early 09/2017, patient's liver failure progressing indicated by worsening clinical signs (abdominal distention, weakness/fatigue) and MELD. In mid-October, patient's PETH demonstrated EtOH use and she was denied candidacy for liver transplant. She was then enrolled in in-patient substance abuse program at [facility]  where she has remained since early November; per patient, her last EtOH beverage was 11/01/2017. Denies history of GI bleed, SBP, renal disease, or other complications of cirrhosis. Liver biopsy 10/24 demonstrated stage 4 cirrhosis. Records reports EGD in August 2016 that showed small esophageal varices with severe gastritis. Colonoscopy 2015 with 2 polyps and internal hemorrhoids Started on prednisone 40mg QD on 10/25; was on spironolactone, has been held since 11/10 for hypoNa; on lactulose.    Hospital Course:  Mental status improving with hepatic encephalopathy treatment. Started empiric ceftriaxone given leukocytosis in immunocompromised state. CMV PCR positive overnight, for which ID was consulted, recommended repeating titers in 1 week. Seen by Psychiatry and  who determined she was moderate risk for Liver transplant. Will proceed with EGD/Cscope and mammogram after FFP. Colonoscopy not performed due to poor prep, EGD revealed large varices which were banded. IR paracentesis ordered after patient started complaining of abdominal pain. Mammogram performed, IR paracentesis after INR Reversal negative for SBP. Patient accepted for Liver transplant pending review of previous mammograms.  Patient was slightly confused and altered, which resolved with lactulose enemas. She will need a core Breast Biopsy prior to listing in order to rule out malignancy.       Interval History: Patient seen and examined at bedside, no acute events overnight. Will need Breast Core biopsy tomorrow. FFPs ordered     Review of Systems   Constitutional: Negative for chills and fever.   HENT: Negative for rhinorrhea and sore throat.    Eyes: Negative for pain and discharge.   Respiratory: Negative for cough and shortness of breath.    Cardiovascular: Negative for chest pain and leg swelling.   Gastrointestinal: Positive for abdominal distention. Negative for abdominal pain, blood in stool, diarrhea, nausea and vomiting.    Endocrine: Negative for cold intolerance and heat intolerance.   Genitourinary: Negative for dysuria and flank pain.   Neurological: Negative for dizziness and numbness.   Psychiatric/Behavioral: Negative for behavioral problems and confusion.     Objective:     Vital Signs (Most Recent):  Temp: 98.6 °F (37 °C) (12/17/17 1252)  Pulse: 107 (12/17/17 1252)  Resp: 18 (12/17/17 1252)  BP: (!) 114/58 (12/17/17 1252)  SpO2: 96 % (12/17/17 1252) Vital Signs (24h Range):  Temp:  [97.8 °F (36.6 °C)-98.8 °F (37.1 °C)] 98.6 °F (37 °C)  Pulse:  [] 107  Resp:  [16-18] 18  SpO2:  [96 %-99 %] 96 %  BP: (109-124)/(57-68) 114/58     Weight: 69.6 kg (153 lb 7 oz)  Body mass index is 25.53 kg/m².    Intake/Output Summary (Last 24 hours) at 12/17/17 1624  Last data filed at 12/17/17 0400   Gross per 24 hour   Intake              440 ml   Output                0 ml   Net              440 ml      Physical Exam   Constitutional: She appears well-developed and well-nourished.   HENT:   Head: Normocephalic and atraumatic.   Eyes: Conjunctivae are normal. Pupils are equal, round, and reactive to light. Scleral icterus is present.   Neck: Normal range of motion. No thyromegaly present.   Cardiovascular: Normal rate, regular rhythm and normal heart sounds.    Pulmonary/Chest: Effort normal and breath sounds normal.   Abdominal: Soft. She exhibits distension. There is no tenderness.   Musculoskeletal: Normal range of motion. She exhibits no edema.   Neurological: She is alert.   AAO times 2   Skin: No erythema. No pallor.       MELD-Na score: 35 at 12/17/2017  4:36 AM  MELD score: 32 at 12/17/2017  4:36 AM  Calculated from:  Serum Creatinine: 0.8 mg/dL (Rounded to 1) at 12/17/2017  4:36 AM  Serum Sodium: 125 mmol/L at 12/17/2017  4:36 AM  Total Bilirubin: 30.8 mg/dL at 12/17/2017  4:36 AM  INR(ratio): 3.2 at 12/17/2017  4:36 AM  Age: 52 years    Significant Labs:  CBC:    Recent Labs  Lab 12/16/17  0458 12/17/17  0436   WBC 11.35 10.42    HGB 9.4* 9.9*   HCT 27.2* 28.3*   PLT 95* 83*     CMP:    Recent Labs  Lab 12/16/17  0458 12/16/17  1528 12/17/17  0436   * 127* 125*   K 3.8 3.1* 3.2*   CL 92* 92* 90*   CO2 24 23 23   GLU 98 122* 95   BUN 9 10 11   CREATININE 0.9 0.9 0.8   CALCIUM 9.0 9.1 9.1   PROT 6.3 6.2 6.4   ALBUMIN 3.1* 2.8* 3.1*   BILITOT 27.8* 30.0* 30.8*   ALKPHOS 201* 212* 214*   AST 62* 63* 63*   ALT 48* 49* 49*   ANIONGAP 11 12 12   EGFRNONAA >60.0 >60.0 >60.0     PTINR:    Recent Labs  Lab 12/16/17  0458 12/17/17  0436   INR 3.1* 3.2*       Significant Procedures:   Dobutamine Stress Test with Color Flow:   Results for orders placed or performed during the hospital encounter of 10/19/17   Dobutamine Stress Test w/ Color Flow   Result Value Ref Range    EF 73 55 - 65    Diastolic Dysfunction No     Est. PA Systolic Pressure 21.32     Tricuspid Valve Regurgitation TRIVIAL             Assessment / Plan:     * Decompensated hepatic cirrhosis    MELD-Na score: 35 at 12/17/2017  4:36 AM  MELD score: 32 at 12/17/2017  4:36 AM  Calculated from:  Serum Creatinine: 0.8 mg/dL (Rounded to 1) at 12/17/2017  4:36 AM  Serum Sodium: 125 mmol/L at 12/17/2017  4:36 AM  Total Bilirubin: 30.8 mg/dL at 12/17/2017  4:36 AM  INR(ratio): 3.2 at 12/17/2017  4:36 AM  Age: 52 years   - had half of her evaluation completed on last admission  - AS per Psychiatry patient is moderate risk, will proceed with Liver transplant evaluation  - EGD performed, esophageal varices banded,   - Mammogram performed on 12/4/17   - C-scope repeated on 12/5- good prep  - accepted for liver transplant pending Core biopsy of the Breast        Transplant donor evaluation    Presented at Liver selection committee on 12/06/17, approved pending Breast core biopsy to rule out malignancy          Immunosuppressed status    On chronic prednisone for alcoholic hepatitis; Prednisone has been weaned off, last dose of 5mg of prednisone was on 12/15          Coagulopathy    INR 3.0  2  units of FFPs          Alcoholic cirrhosis of liver with ascites    - see decompensated cirrhosis   - IR paracentesis on 12/12 removed 5 liters         Cytomegalovirus (CMV) viremia    -- ID evaluated and do not feel it needs to be treated        Thrombocytopenia    -- monitor           Abnormal liver enzymes    -- Stable  -- Etiology liver failure; see that section for more details          Alcoholic hepatitis with ascites- decompensated with elevated MELD    - cont tapering prednisone, currently at 5 mg daily         Hyponatremia    - on 500 cc fluid restriction; on albumin  - 116-->118-->120-->122-->123-->126-->125-->127-->131-->133-->136-->135-->132-->129-->127-->126        End stage liver disease              HCC (hepatocellular carcinoma)    AFP 9; seen on CT on 10/19, 1 cm focus, monitor for now          Hepatic encephalopathy    -- mental status improved considerably after lactulose enemas         Severe protein-calorie malnutrition    PAB, boost and dietary consult          Ascites    -- Holding diuretics at present, s/p 2L removed on 11/22 with testing negative for SBP  -- No abdominal pain today    - IR paracentesis on 12/12/17 removed 5.2 liters               Essential hypertension    - Stable  - Holding home lisinopril in setting of decompensated liver disease          Anemia of chronic disease    - monitor daily                VTE Risk Mitigation         Ordered     Place sequential compression device  Until discontinued      11/24/17 0734     Place FABIOLA hose  Until discontinued      11/23/17 2305     Medium Risk of VTE  Once      11/23/17 2305           Discharge Disposition: pending Liver transplant listing     Time taken to evaluate, plan, and coordinate patient care:35 minutes.    Surinder Madrigal MD  Department of Hospital Medicine  [facility]

## 2017-12-17 NOTE — PLAN OF CARE
Problem: Patient Care Overview  Goal: Plan of Care Review  Outcome: Ongoing (interventions implemented as appropriate)  Pt is AOx3; occasionally disoriented to time/date. AVSS throughout shift. Nightly lactulose held due to 6 BM's during the day & no s/s of asterixis. Able to get up to bathroom using walker with no issues; diaper in place for occasional episodes of incontinence. No acute events/changes occured overnight. WCTM.

## 2017-12-17 NOTE — ASSESSMENT & PLAN NOTE
- on 500 cc fluid restriction; on albumin  - 116-->118-->120-->122-->123-->126-->125-->127-->131-->133-->136-->135-->132-->129-->127-->126

## 2017-12-17 NOTE — PROGRESS NOTES
[facility]  Hepatology  Progress Note    Patient Name: Marcie Bazan  MRN: [id number]  Admission Date: 11/23/2017  Hospital Length of Stay: 24 days  Attending Provider: Surinder Madrigal MD   Primary Care Physician: ODILIA Wall  Principal Problem:Decompensated hepatic cirrhosis    Subjective:     Transplant status: Pre-transplant    HPI: 52 year old female with PMHx of ESLD 2/2 EtOH hepatitis and essential HTN who presents to [facility] as a direct transfer from [facility] for evaluation of decompensated liver cirrhosis. Patient presented to [facility] with generalized weakness and was admitted to  11/10 for hyponatremia seen on labs in GI office. Discharged on 11/15 and returned on 11/20 with 1 week worsening weakness, malaise, lethargy, and decreased appetite. Denied fever/chills, abdominal pain, bright red or black tarry stools, hematemesis or other abnormal bleeding, confusion or disorientation, changes in urination, light-headedness, headache, changes in vision, dyspnea, chest pain or palpitations. Labs demonstrated Na 116 and MELD 32; patient admitted for hyponatremia. Nephrology consulted, recommended fluid restriction , holding diuretics, and prednisode taper that started 11/23 (decreased from 40mg QD to 20mg Qd.) Patient transferred to [facility] for revaluation of liver disease. Of note, paracentesis performed 11/22, removed 2L, negative for SBP.     Follows with Dr. Souza for cirrhosis. Since early 09/2017, patient's liver failure progressing indicated by worsening clinical signs (abdominal distention, weakness/fatigue) and MELD. She had been evaluated by [facility] liver transplant committee 10/25/2017 and declined transplant candidacy 2/2 continued EtOH use and lack of solid caregiver support/plan.She has since been enrolled in in-patient substance abuse program at [facility] where she has remained since early November; per patient, her last EtOH beverage  "was 11/01/2017.  Per chart review on 11/22/17:     "Patient's case was discussed in liver selection committee today. Per committee discussion, the SW will call patient's daughter to discuss caregiver plan at this time. If the caregiver plan is adequate, patient will need to follow up with either/both addiction psych or SW regarding substance abuse and plan. May need to do this inpatient if patient is still admitted."        Denies history of GI bleed, SBP, renal disease, or other complications of cirrhosis. Liver biopsy 10/24 demonstrated stage 4 cirrhosis. Records reports EGD in August 2016 that showed small esophageal varices with severe gastritis. Colonoscopy 2015 with 2 polyps and internal hemorrhoids Started on prednisone 40mg QD on 10/25; was on spironolactone, has been held since 11/10 for hypoNa; on lactulose.    Interval History:   No acute events overnight.  She complains of increasing abdominal distension and wanting to have a paracentesis.     Current Facility-Administered Medications   Medication    GI cocktail (mylanta 30 mL, lidocaine 2 % viscous 10 mL, dicyclomine 10 mL) 50 mL    lactulose 20 gram/30 mL solution Soln 30 g    midodrine tablet 5 mg    ondansetron disintegrating tablet 4 mg    ondansetron injection 4 mg    pantoprazole EC tablet 40 mg    phytonadione vitamin k (AQUA-MEPHYTON) 5 mg in dextrose 5 % 50 mL IVPB    potassium chloride SA CR tablet 20 mEq    rifAXIMin tablet 550 mg    simethicone chewable tablet 80 mg    sodium chloride 0.9% flush 3 mL    sodium chloride 0.9% flush 3 mL    sodium chloride 0.9% flush 5 mL    zinc sulfate capsule 220 mg       Objective:     Vital Signs (Most Recent):  Temp: 98.6 °F (37 °C) (12/17/17 1252)  Pulse: 107 (12/17/17 1252)  Resp: 18 (12/17/17 1252)  BP: (!) 114/58 (12/17/17 1252)  SpO2: 96 % (12/17/17 1252) Vital Signs (24h Range):  Temp:  [97.8 °F (36.6 °C)-98.8 °F (37.1 °C)] 98.6 °F (37 °C)  Pulse:  [] 107  Resp:  [16-18] " 18  SpO2:  [96 %-99 %] 96 %  BP: (109-124)/(57-68) 114/58     Weight: 69.6 kg (153 lb 7 oz) (12/12/17 0700)  Body mass index is 25.53 kg/m².    Physical Exam   Constitutional: She is oriented to person, place, and time. No distress.   HENT:   Head: Normocephalic and atraumatic.   Eyes: No scleral icterus.   Neck: Normal range of motion.   Cardiovascular: Regular rhythm and normal heart sounds.    Pulmonary/Chest: Effort normal and breath sounds normal.   Abdominal: Soft. Bowel sounds are normal. She exhibits distension. She exhibits no mass. There is no tenderness. There is no rebound and no guarding. No hernia.   Musculoskeletal: Normal range of motion. She exhibits edema.   Neurological: She is alert and oriented to person, place, and time.   Skin: She is not diaphoretic.       MELD-Na score: 35 at 12/17/2017  4:36 AM  MELD score: 32 at 12/17/2017  4:36 AM  Calculated from:  Serum Creatinine: 0.8 mg/dL (Rounded to 1) at 12/17/2017  4:36 AM  Serum Sodium: 125 mmol/L at 12/17/2017  4:36 AM  Total Bilirubin: 30.8 mg/dL at 12/17/2017  4:36 AM  INR(ratio): 3.2 at 12/17/2017  4:36 AM  Age: 52 years    Significant Labs:  CBC:     Recent Labs  Lab 12/17/17  0436   WBC 10.42   RBC 3.13*   HGB 9.9*   HCT 28.3*   PLT 83*     CMP:     Recent Labs  Lab 12/17/17  0436   GLU 95   CALCIUM 9.1   ALBUMIN 3.1*   PROT 6.4   *   K 3.2*   CO2 23   CL 90*   BUN 11   CREATININE 0.8   ALKPHOS 214*   ALT 49*   AST 63*   BILITOT 30.8*     Coagulation:     Recent Labs  Lab 12/17/17  0436   INR 3.2*       Significant Imaging:  None    Assessment/Plan:     * Decompensated hepatic cirrhosis    52 year old female with a history of ESLD 2/2 to alcoholic hepatitis who decompensated while being in rehab. She has completed the inpatient evaluation and approved for listing based on breast core biopsy schedule for tomorrow. Currently on Vitamin K IV and will need FFP tomorrow morning to make sure INR is less than 2.    Sodium trending down  despite being off diuretics therefore will further fluid restrict. Otherwise LFT's and H/H holding.    Recommendations  -NPO after MN for breast biopsy tomorrow  -Continue IV VitK   -FFP tomorrow morning for goal INR < 2  -Fluid restriction to 500 cc and hold diuretics  -Repeat CXR and UA   -Check morning cortisol (order placed)  -Continue lactulose and rifaximin and zinc  -Continue PPI and PRN GI cocktail  -Approved for listing pending core needle breast biopsy                   Thank you for your consult. I will follow-up with patient. Please contact us if you have any additional questions.    Flor Rivero M.D.  Gastroenterology Fellow, PGY-IV  Pager: [phone]  [facility]

## 2017-12-17 NOTE — ASSESSMENT & PLAN NOTE
MELD-Na score: 35 at 12/17/2017  4:36 AM  MELD score: 32 at 12/17/2017  4:36 AM  Calculated from:  Serum Creatinine: 0.8 mg/dL (Rounded to 1) at 12/17/2017  4:36 AM  Serum Sodium: 125 mmol/L at 12/17/2017  4:36 AM  Total Bilirubin: 30.8 mg/dL at 12/17/2017  4:36 AM  INR(ratio): 3.2 at 12/17/2017  4:36 AM  Age: 52 years   - had half of her evaluation completed on last admission  - AS per Psychiatry patient is moderate risk, will proceed with Liver transplant evaluation  - EGD performed, esophageal varices banded,   - Mammogram performed on 12/4/17   - C-scope repeated on 12/5- good prep  - accepted for liver transplant pending Core biopsy of the Breast

## 2017-12-17 NOTE — ASSESSMENT & PLAN NOTE
On chronic prednisone for alcoholic hepatitis; Prednisone has been weaned off, last dose of 5mg of prednisone was on 12/15

## 2017-12-17 NOTE — SUBJECTIVE & OBJECTIVE
Interval History: Patient seen and examined at bedside, no acute events overnight. Will need Breast Core biopsy tomorrow. FFPs ordered     Review of Systems   Constitutional: Negative for chills and fever.   HENT: Negative for rhinorrhea and sore throat.    Eyes: Negative for pain and discharge.   Respiratory: Negative for cough and shortness of breath.    Cardiovascular: Negative for chest pain and leg swelling.   Gastrointestinal: Positive for abdominal distention. Negative for abdominal pain, blood in stool, diarrhea, nausea and vomiting.   Endocrine: Negative for cold intolerance and heat intolerance.   Genitourinary: Negative for dysuria and flank pain.   Neurological: Negative for dizziness and numbness.   Psychiatric/Behavioral: Negative for behavioral problems and confusion.     Objective:     Vital Signs (Most Recent):  Temp: 98.6 °F (37 °C) (12/17/17 1252)  Pulse: 107 (12/17/17 1252)  Resp: 18 (12/17/17 1252)  BP: (!) 114/58 (12/17/17 1252)  SpO2: 96 % (12/17/17 1252) Vital Signs (24h Range):  Temp:  [97.8 °F (36.6 °C)-98.8 °F (37.1 °C)] 98.6 °F (37 °C)  Pulse:  [] 107  Resp:  [16-18] 18  SpO2:  [96 %-99 %] 96 %  BP: (109-124)/(57-68) 114/58     Weight: 69.6 kg (153 lb 7 oz)  Body mass index is 25.53 kg/m².    Intake/Output Summary (Last 24 hours) at 12/17/17 1624  Last data filed at 12/17/17 0400   Gross per 24 hour   Intake              440 ml   Output                0 ml   Net              440 ml      Physical Exam   Constitutional: She appears well-developed and well-nourished.   HENT:   Head: Normocephalic and atraumatic.   Eyes: Conjunctivae are normal. Pupils are equal, round, and reactive to light. Scleral icterus is present.   Neck: Normal range of motion. No thyromegaly present.   Cardiovascular: Normal rate, regular rhythm and normal heart sounds.    Pulmonary/Chest: Effort normal and breath sounds normal.   Abdominal: Soft. She exhibits distension. There is no tenderness.    Musculoskeletal: Normal range of motion. She exhibits no edema.   Neurological: She is alert.   AAO times 2   Skin: No erythema. No pallor.       MELD-Na score: 35 at 12/17/2017  4:36 AM  MELD score: 32 at 12/17/2017  4:36 AM  Calculated from:  Serum Creatinine: 0.8 mg/dL (Rounded to 1) at 12/17/2017  4:36 AM  Serum Sodium: 125 mmol/L at 12/17/2017  4:36 AM  Total Bilirubin: 30.8 mg/dL at 12/17/2017  4:36 AM  INR(ratio): 3.2 at 12/17/2017  4:36 AM  Age: 52 years    Significant Labs:  CBC:    Recent Labs  Lab 12/16/17 0458 12/17/17  0436   WBC 11.35 10.42   HGB 9.4* 9.9*   HCT 27.2* 28.3*   PLT 95* 83*     CMP:    Recent Labs  Lab 12/16/17  0458 12/16/17  1528 12/17/17  0436   * 127* 125*   K 3.8 3.1* 3.2*   CL 92* 92* 90*   CO2 24 23 23   GLU 98 122* 95   BUN 9 10 11   CREATININE 0.9 0.9 0.8   CALCIUM 9.0 9.1 9.1   PROT 6.3 6.2 6.4   ALBUMIN 3.1* 2.8* 3.1*   BILITOT 27.8* 30.0* 30.8*   ALKPHOS 201* 212* 214*   AST 62* 63* 63*   ALT 48* 49* 49*   ANIONGAP 11 12 12   EGFRNONAA >60.0 >60.0 >60.0     PTINR:    Recent Labs  Lab 12/16/17  0458 12/17/17  0436   INR 3.1* 3.2*       Significant Procedures:   Dobutamine Stress Test with Color Flow:   Results for orders placed or performed during the hospital encounter of 10/19/17   Dobutamine Stress Test w/ Color Flow   Result Value Ref Range    EF 73 55 - 65    Diastolic Dysfunction No     Est. PA Systolic Pressure 21.32     Tricuspid Valve Regurgitation TRIVIAL

## 2017-12-17 NOTE — ASSESSMENT & PLAN NOTE
Presented at Liver selection committee on 12/06/17, approved pending Breast core biopsy to rule out malignancy

## 2017-12-17 NOTE — ASSESSMENT & PLAN NOTE
52 year old female with a history of ESLD 2/2 to alcoholic hepatitis who decompensated while being in rehab. She has completed the inpatient evaluation and approved for listing based on breast core biopsy schedule for tomorrow. Currently on Vitamin K IV and will need FFP tomorrow morning to make sure INR is less than 2.    Sodium trending down despite being off diuretics therefore will further fluid restrict. Otherwise LFT's and H/H holding.    Recommendations  -NPO after MN for breast biopsy tomorrow  -Continue IV VitK   -FFP tomorrow morning for goal INR < 2  -Fluid restriction to 500 cc and hold diuretics  -Repeat CXR and UA   -Continue lactulose and rifaximin and zinc  -Continue PPI and PRN GI cocktail  -Approved for listing pending core needle breast biopsy

## 2017-12-18 LAB
ALBUMIN SERPL BCP-MCNC: 2.9 G/DL
ALBUMIN SERPL BCP-MCNC: 3 G/DL
ALP SERPL-CCNC: 214 U/L
ALP SERPL-CCNC: 217 U/L
ALT SERPL W/O P-5'-P-CCNC: 47 U/L
ALT SERPL W/O P-5'-P-CCNC: 49 U/L
ANION GAP SERPL CALC-SCNC: 10 MMOL/L
ANION GAP SERPL CALC-SCNC: 8 MMOL/L
AST SERPL-CCNC: 69 U/L
AST SERPL-CCNC: 70 U/L
BASOPHILS # BLD AUTO: 0.06 K/UL
BASOPHILS NFR BLD: 0.5 %
BILIRUB SERPL-MCNC: 28 MG/DL
BILIRUB SERPL-MCNC: 28.1 MG/DL
BLD PROD TYP BPU: NORMAL
BLD PROD TYP BPU: NORMAL
BLOOD UNIT EXPIRATION DATE: NORMAL
BLOOD UNIT EXPIRATION DATE: NORMAL
BLOOD UNIT TYPE CODE: 600
BLOOD UNIT TYPE CODE: 6200
BLOOD UNIT TYPE: NORMAL
BLOOD UNIT TYPE: NORMAL
BUN SERPL-MCNC: 11 MG/DL
BUN SERPL-MCNC: 12 MG/DL
CALCIUM SERPL-MCNC: 8.9 MG/DL
CALCIUM SERPL-MCNC: 9.2 MG/DL
CHLORIDE SERPL-SCNC: 94 MMOL/L
CHLORIDE SERPL-SCNC: 96 MMOL/L
CO2 SERPL-SCNC: 20 MMOL/L
CO2 SERPL-SCNC: 24 MMOL/L
CODING SYSTEM: NORMAL
CODING SYSTEM: NORMAL
CORTIS SERPL-MCNC: 8.2 UG/DL
CREAT SERPL-MCNC: 0.7 MG/DL
CREAT SERPL-MCNC: 0.9 MG/DL
DIFFERENTIAL METHOD: ABNORMAL
DISPENSE STATUS: NORMAL
DISPENSE STATUS: NORMAL
EOSINOPHIL # BLD AUTO: 0.1 K/UL
EOSINOPHIL NFR BLD: 0.5 %
ERYTHROCYTE [DISTWIDTH] IN BLOOD BY AUTOMATED COUNT: 19.2 %
EST. GFR  (AFRICAN AMERICAN): >60 ML/MIN/1.73 M^2
EST. GFR  (AFRICAN AMERICAN): >60 ML/MIN/1.73 M^2
EST. GFR  (NON AFRICAN AMERICAN): >60 ML/MIN/1.73 M^2
EST. GFR  (NON AFRICAN AMERICAN): >60 ML/MIN/1.73 M^2
GLUCOSE SERPL-MCNC: 85 MG/DL
GLUCOSE SERPL-MCNC: 88 MG/DL
HCT VFR BLD AUTO: 25.1 %
HGB BLD-MCNC: 8.8 G/DL
IMM GRANULOCYTES # BLD AUTO: 0.05 K/UL
IMM GRANULOCYTES NFR BLD AUTO: 0.5 %
INR PPP: 2.3
INR PPP: 2.7
LYMPHOCYTES # BLD AUTO: 3.8 K/UL
LYMPHOCYTES NFR BLD: 34.2 %
MAGNESIUM SERPL-MCNC: 1.2 MG/DL
MCH RBC QN AUTO: 31.9 PG
MCHC RBC AUTO-ENTMCNC: 35.1 G/DL
MCV RBC AUTO: 91 FL
MONOCYTES # BLD AUTO: 1.7 K/UL
MONOCYTES NFR BLD: 15 %
NEUTROPHILS # BLD AUTO: 5.4 K/UL
NEUTROPHILS NFR BLD: 49.3 %
NRBC BLD-RTO: 0 /100 WBC
NUM UNITS TRANS FFP: NORMAL
NUM UNITS TRANS FFP: NORMAL
PHOSPHATE SERPL-MCNC: 2.1 MG/DL
PLATELET # BLD AUTO: 93 K/UL
PMV BLD AUTO: 10.6 FL
POTASSIUM SERPL-SCNC: 4.1 MMOL/L
POTASSIUM SERPL-SCNC: 4.6 MMOL/L
PROT SERPL-MCNC: 6.2 G/DL
PROT SERPL-MCNC: 6.3 G/DL
PROTHROMBIN TIME: 23.5 SEC
PROTHROMBIN TIME: 26.7 SEC
RBC # BLD AUTO: 2.76 M/UL
SODIUM SERPL-SCNC: 126 MMOL/L
SODIUM SERPL-SCNC: 126 MMOL/L
WBC # BLD AUTO: 11.02 K/UL

## 2017-12-18 PROCEDURE — 36415 COLL VENOUS BLD VENIPUNCTURE: CPT

## 2017-12-18 PROCEDURE — 99233 SBSQ HOSP IP/OBS HIGH 50: CPT | Mod: ,,, | Performed by: INTERNAL MEDICINE

## 2017-12-18 PROCEDURE — 80053 COMPREHEN METABOLIC PANEL: CPT

## 2017-12-18 PROCEDURE — 83735 ASSAY OF MAGNESIUM: CPT

## 2017-12-18 PROCEDURE — 85610 PROTHROMBIN TIME: CPT

## 2017-12-18 PROCEDURE — 20600001 HC STEP DOWN PRIVATE ROOM

## 2017-12-18 PROCEDURE — P9017 PLASMA 1 DONOR FRZ W/IN 8 HR: HCPCS

## 2017-12-18 PROCEDURE — 84100 ASSAY OF PHOSPHORUS: CPT

## 2017-12-18 PROCEDURE — 99233 SBSQ HOSP IP/OBS HIGH 50: CPT | Mod: ,,, | Performed by: HOSPITALIST

## 2017-12-18 PROCEDURE — 63600175 PHARM REV CODE 636 W HCPCS: Performed by: HOSPITALIST

## 2017-12-18 PROCEDURE — 85610 PROTHROMBIN TIME: CPT | Mod: 91

## 2017-12-18 PROCEDURE — 85025 COMPLETE CBC W/AUTO DIFF WBC: CPT

## 2017-12-18 PROCEDURE — 25000003 PHARM REV CODE 250: Performed by: HOSPITALIST

## 2017-12-18 PROCEDURE — 97530 THERAPEUTIC ACTIVITIES: CPT

## 2017-12-18 PROCEDURE — 25000003 PHARM REV CODE 250: Performed by: STUDENT IN AN ORGANIZED HEALTH CARE EDUCATION/TRAINING PROGRAM

## 2017-12-18 PROCEDURE — 82533 TOTAL CORTISOL: CPT

## 2017-12-18 RX ORDER — HYDROCODONE BITARTRATE AND ACETAMINOPHEN 500; 5 MG/1; MG/1
TABLET ORAL
Status: DISCONTINUED | OUTPATIENT
Start: 2017-12-18 | End: 2017-12-19

## 2017-12-18 RX ORDER — MAGNESIUM SULFATE HEPTAHYDRATE 40 MG/ML
2 INJECTION, SOLUTION INTRAVENOUS
Status: COMPLETED | OUTPATIENT
Start: 2017-12-18 | End: 2017-12-18

## 2017-12-18 RX ADMIN — PANTOPRAZOLE SODIUM 40 MG: 40 TABLET, DELAYED RELEASE ORAL at 09:12

## 2017-12-18 RX ADMIN — MAGNESIUM SULFATE IN WATER 2 G: 40 INJECTION, SOLUTION INTRAVENOUS at 06:12

## 2017-12-18 RX ADMIN — MIDODRINE HYDROCHLORIDE 5 MG: 5 TABLET ORAL at 09:12

## 2017-12-18 RX ADMIN — RIFAXIMIN 550 MG: 550 TABLET ORAL at 09:12

## 2017-12-18 RX ADMIN — MAGNESIUM SULFATE IN WATER 2 G: 40 INJECTION, SOLUTION INTRAVENOUS at 08:12

## 2017-12-18 RX ADMIN — LACTULOSE 30 G: 20 SOLUTION ORAL at 05:12

## 2017-12-18 RX ADMIN — LACTULOSE 30 G: 20 SOLUTION ORAL at 04:12

## 2017-12-18 RX ADMIN — MIDODRINE HYDROCHLORIDE 5 MG: 5 TABLET ORAL at 05:12

## 2017-12-18 RX ADMIN — MIDODRINE HYDROCHLORIDE 5 MG: 5 TABLET ORAL at 04:12

## 2017-12-18 RX ADMIN — PHYTONADIONE 5 MG: 10 INJECTION, EMULSION INTRAMUSCULAR; INTRAVENOUS; SUBCUTANEOUS at 01:12

## 2017-12-18 RX ADMIN — Medication 220 MG: at 09:12

## 2017-12-18 NOTE — ASSESSMENT & PLAN NOTE
MELD-Na score: 34 at 12/18/2017  4:18 AM  MELD score: 30 at 12/18/2017  4:18 AM  Calculated from:  Serum Creatinine: 0.7 mg/dL (Rounded to 1) at 12/18/2017  4:18 AM  Serum Sodium: 126 mmol/L at 12/18/2017  4:18 AM  Total Bilirubin: 28.0 mg/dL at 12/18/2017  4:18 AM  INR(ratio): 2.7 at 12/18/2017  4:18 AM  Age: 52 years   - had half of her evaluation completed on last admission  - AS per Psychiatry patient is moderate risk, will proceed with Liver transplant evaluation  - EGD performed, esophageal varices banded,   - Mammogram performed on 12/4/17   - C-scope repeated on 12/5- good prep  - accepted for liver transplant pending Core biopsy of the Breast

## 2017-12-18 NOTE — PT/OT/SLP PROGRESS
Physical Therapy Treatment/Discharge Summary    Patient Name:  Marcie Bazan   MRN:  [id number]    Recommendations:     Discharge Recommendations:  home   Discharge Equipment Recommendations: shower chair   Barriers to discharge: None    Assessment:     Marcie aBzan is a 52 y.o. female admitted with a medical diagnosis of Decompensated hepatic cirrhosis.  She presents with the following impairments/functional limitations:  weakness, impaired endurance, impaired self care skills, impaired functional mobilty, gait instability, impaired balance . Patient demonstrates safe functional mobility, transfer skills, and ambulation skills. Patient educated on followup HEP to perform following d/c. Patient reports compliance. Patient appropriate for d/c from inpatient PT services.     Rehab Prognosis:  good; patient would benefit from acute skilled PT services to address these deficits and reach maximum level of function.      Recent Surgery: Procedure(s) (LRB):  COLONOSCOPY (N/A) 13 Days Post-Op    Plan:     During this hospitalization, patient to be seen 2 x/week to address the above listed problems via gait training, therapeutic activities, therapeutic exercises  · Plan of Care Expires:  12/26/17   Plan of Care Reviewed with: patient    Subjective     Communicated with nurse prior to session.  Patient found L sidelying upon PT entry to room, agreeable to treatment.      Chief Complaint: decreased functional mobility  Patient comments/goals: to get better  Pain/Comfort:  · Pain Rating 1: 0/10    Patients cultural, spiritual, Tenriism conflicts given the current situation: none stated    Objective:     Patient found with:  (all lines intact)     General Precautions: Standard, fall   Orthopedic Precautions:N/A   Braces:       Functional Mobility:  · Bed Mobility:     · Supine to Sit: supervision  · Sit to Supine: supervision  · Transfers:     · Sit to Stand:  stand by assistance with rollator  · Gait: ~100 feet  · Patient  ambulated w/ rollator at Mod I w/ no lisbet or balance deficits noted      AM-PAC 6 CLICK MOBILITY  Turning over in bed (including adjusting bedclothes, sheets and blankets)?: 4  Sitting down on and standing up from a chair with arms (e.g., wheelchair, bedside commode, etc.): 4  Moving from lying on back to sitting on the side of the bed?: 4  Moving to and from a bed to a chair (including a wheelchair)?: 4  Need to walk in hospital room?: 4  Climbing 3-5 steps with a railing?: 3  Total Score: 23       Therapeutic Activities and Exercises:   Patient assisted w/ hallway ambulation  Patient educated on HEP followup upon d/c  Patient to received written HEP for reinforcement     Patient left left sidelying with all lines intact and call button in reach..    GOALS:    Physical Therapy Goals     Not on file          Multidisciplinary Problems (Resolved)        Problem: Physical Therapy Goal    Goal Priority Disciplines Outcome Goal Variances Interventions   Physical Therapy Goal   (Resolved)     PT/OT, PT Outcome(s) achieved     Description:  Goals to be met by: 17    Patient will increase functional independence with mobility by performin. Supine to sit with Set-up [place] MET  2. Sit to supine with Set-up [place] MET  3. Sit to stand transfer with Supervision MET  4. Bed to chair transfer with Supervision using Rolling Walker MET  5. Gait  x 100 feet with Stand-by Assistance using Rolling Walker. MET  Revised: Gait x 300 ft with rollator usage and (S)   6. Lower extremity exercise program x20 reps per handout, with independence                          Time Tracking:     PT Received On: 17  PT Start Time: 1003     PT Stop Time: 1015  PT Total Time (min): 12 min     Billable Minutes: Therapeutic Activity 12 Min    Treatment Type: Treatment  PT/PTA: PT     PTA Visit Number: 1     Khoi Melendrez, PT  2017

## 2017-12-18 NOTE — SUBJECTIVE & OBJECTIVE
Interval History: Patient seen and examined at bedside, no acute events overnight. Breast core biopsy postponed until coagulopathy has resolved.     Review of Systems   Constitutional: Negative for chills and fever.   HENT: Negative for rhinorrhea and sore throat.    Eyes: Negative for pain and discharge.   Respiratory: Negative for cough and shortness of breath.    Cardiovascular: Negative for chest pain and leg swelling.   Gastrointestinal: Positive for abdominal distention. Negative for abdominal pain, blood in stool, diarrhea, nausea and vomiting.   Endocrine: Negative for cold intolerance and heat intolerance.   Genitourinary: Negative for dysuria and flank pain.   Neurological: Negative for dizziness and numbness.   Psychiatric/Behavioral: Negative for behavioral problems and confusion.     Objective:     Vital Signs (Most Recent):  Temp: 98.8 °F (37.1 °C) (12/18/17 1210)  Pulse: 99 (12/18/17 1210)  Resp: 18 (12/18/17 1210)  BP: 112/60 (12/18/17 1210)  SpO2: 99 % (12/18/17 1210) Vital Signs (24h Range):  Temp:  [97.1 °F (36.2 °C)-99.3 °F (37.4 °C)] 98.8 °F (37.1 °C)  Pulse:  [] 99  Resp:  [16-18] 18  SpO2:  [97 %-100 %] 99 %  BP: (104-139)/(54-89) 112/60     Weight: 62.3 kg (137 lb 5.6 oz)  Body mass index is 22.86 kg/m².    Intake/Output Summary (Last 24 hours) at 12/18/17 1533  Last data filed at 12/18/17 0045   Gross per 24 hour   Intake            892.5 ml   Output                0 ml   Net            892.5 ml      Physical Exam   Constitutional: She appears well-developed and well-nourished.   HENT:   Head: Normocephalic and atraumatic.   Eyes: Conjunctivae are normal. Pupils are equal, round, and reactive to light. Scleral icterus is present.   Neck: Normal range of motion. No thyromegaly present.   Cardiovascular: Normal rate, regular rhythm and normal heart sounds.    Pulmonary/Chest: Effort normal and breath sounds normal.   Abdominal: Soft. She exhibits distension. There is no tenderness.    Musculoskeletal: Normal range of motion. She exhibits no edema.   Neurological: She is alert.   AAO times 2   Skin: No erythema. No pallor.       MELD-Na score: 34 at 12/18/2017  4:18 AM  MELD score: 30 at 12/18/2017  4:18 AM  Calculated from:  Serum Creatinine: 0.7 mg/dL (Rounded to 1) at 12/18/2017  4:18 AM  Serum Sodium: 126 mmol/L at 12/18/2017  4:18 AM  Total Bilirubin: 28.0 mg/dL at 12/18/2017  4:18 AM  INR(ratio): 2.7 at 12/18/2017  4:18 AM  Age: 52 years    Significant Labs:  CBC:    Recent Labs  Lab 12/17/17  0436 12/18/17 0418   WBC 10.42 11.02   HGB 9.9* 8.8*   HCT 28.3* 25.1*   PLT 83* 93*     CMP:    Recent Labs  Lab 12/17/17  0436 12/17/17  1554 12/18/17 0418   * 126* 126*   K 3.2* 3.9 4.6   CL 90* 91* 96   CO2 23 23 20*   GLU 95 113* 85   BUN 11 13 12   CREATININE 0.8 0.7 0.7   CALCIUM 9.1 8.8 8.9   PROT 6.4 6.6 6.2   ALBUMIN 3.1* 3.1* 2.9*   BILITOT 30.8* 32.8* 28.0*   ALKPHOS 214* 222* 214*   AST 63* 73* 69*   ALT 49* 52* 49*   ANIONGAP 12 12 10   EGFRNONAA >60.0 >60.0 >60.0     PTINR:    Recent Labs  Lab 12/17/17  0436 12/18/17  0418   INR 3.2* 2.7*       Significant Procedures:   Dobutamine Stress Test with Color Flow:   Results for orders placed or performed during the hospital encounter of 10/19/17   Dobutamine Stress Test w/ Color Flow   Result Value Ref Range    EF 73 55 - 65    Diastolic Dysfunction No     Est. PA Systolic Pressure 21.32     Tricuspid Valve Regurgitation TRIVIAL

## 2017-12-18 NOTE — PLAN OF CARE
Problem: Patient Care Overview  Goal: Plan of Care Review  Outcome: Ongoing (interventions implemented as appropriate)  Pt AAOX4. AVSS. Pt transfused one unit FFP. No complications. Pt had 4 bms, loose yellow/green. New PIV placed. Pt safety maintained, hourly rounding performed.

## 2017-12-18 NOTE — PLAN OF CARE
Problem: Physical Therapy Goal  Goal: Physical Therapy Goal  Goals to be met by: 17    Patient will increase functional independence with mobility by performin. Supine to sit with Set-up [place] MET  2. Sit to supine with Set-up [place] MET  3. Sit to stand transfer with Supervision MET  4. Bed to chair transfer with Supervision using Rolling Walker MET  5. Gait  x 100 feet with Stand-by Assistance using Rolling Walker. MET  Revised: Gait x 300 ft with rollator usage and (S)   6. Lower extremity exercise program x20 reps per handout, with independence         Outcome: Outcome(s) achieved Date Met: 17  Gait distance goal not formally met, but patient demonstrates safe functional mobility. Appropriate for d/c from inpatient PT services. Patient educated on followup HEP.    Khoi Melendrez, PT  2017

## 2017-12-18 NOTE — PROGRESS NOTES
[facility] Hospital Medicine  Progress Note    Patient Name: Marcie Bazan  MRN: [id number]  Patient Class: IP- Inpatient   Admission Date: 11/23/2017  Length of Stay: 25 days  Expected Discharge Date: 12/22/2017  Attending Physician: Surinder Madrigal MD  Primary Care Provider: Milton Ferrer, [facility] Medicine Team: [facility] HOSP MED A Surinder Madrigal MD  Principal Problem:Decompensated hepatic cirrhosis    Subjective:     HPI:   52 year old female with PMHx of 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 Patient presented to [facility] with generalized weakness. Onset two months ago, progressively worsening. Was admitted to  11/10 for hyponatremia seen on labs in GI office. Discharged on 11/15 and returned on 11/20 with 1 week worsening weakness, malaise, lethargy, and decreased appetite. Denied fever/chills, abdominal pain, bright red or black tarry stools, hematemesis or other abnormal bleeding, confusion or disorientation, changes in urination, light-headedness, headache, changes in vision, dyspnea, chest pain or palpitations. Labs demonstrated Na 116 and MELD 32; patient admitted for hyponatremia. Nephrology consulted, recommended fluid restriction , holding diuretics, and prednisode taper that started 11/23 (decreased from 40mg QD to 20mg Qd.) Patient transferred to [facility] for revaluation of liver disease. Of note, paracentesis performed 11/22, removed 2L, negative for SBP.    Follows with Dr. Souza for cirrhosis. Since early 09/2017, patient's liver failure progressing indicated by worsening clinical signs (abdominal distention, weakness/fatigue) and MELD. In mid-October, patient's PETH demonstrated EtOH use and she was denied candidacy for liver transplant. She was then enrolled in in-patient substance abuse program at [facility]  where she has remained since early November; per patient, her last EtOH beverage was 11/01/2017. Denies history of GI bleed, SBP, renal disease, or other complications of cirrhosis. Liver biopsy 10/24 demonstrated stage 4 cirrhosis. Records reports EGD in August 2016 that showed small esophageal varices with severe gastritis. Colonoscopy 2015 with 2 polyps and internal hemorrhoids Started on prednisone 40mg QD on 10/25; was on spironolactone, has been held since 11/10 for hypoNa; on lactulose.    Hospital Course:  Mental status improving with hepatic encephalopathy treatment. Started empiric ceftriaxone given leukocytosis in immunocompromised state. CMV PCR positive overnight, for which ID was consulted, recommended repeating titers in 1 week. Seen by Psychiatry and  who determined she was moderate risk for Liver transplant. Will proceed with EGD/Cscope and mammogram after FFP. Colonoscopy not performed due to poor prep, EGD revealed large varices which were banded. IR paracentesis ordered after patient started complaining of abdominal pain. Mammogram performed, IR paracentesis after INR Reversal negative for SBP. Patient accepted for Liver transplant pending review of previous mammograms.  Patient was slightly confused and altered, which resolved with lactulose enemas. She will need a core Breast Biopsy prior to listing in order to rule out malignancy.       Interval History: Patient seen and examined at bedside, no acute events overnight. Breast core biopsy postponed until coagulopathy has resolved.     Review of Systems   Constitutional: Negative for chills and fever.   HENT: Negative for rhinorrhea and sore throat.    Eyes: Negative for pain and discharge.   Respiratory: Negative for cough and shortness of breath.    Cardiovascular: Negative for chest pain and leg swelling.   Gastrointestinal: Positive for abdominal distention. Negative for abdominal pain, blood in stool, diarrhea, nausea and  vomiting.   Endocrine: Negative for cold intolerance and heat intolerance.   Genitourinary: Negative for dysuria and flank pain.   Neurological: Negative for dizziness and numbness.   Psychiatric/Behavioral: Negative for behavioral problems and confusion.     Objective:     Vital Signs (Most Recent):  Temp: 98.8 °F (37.1 °C) (12/18/17 1210)  Pulse: 99 (12/18/17 1210)  Resp: 18 (12/18/17 1210)  BP: 112/60 (12/18/17 1210)  SpO2: 99 % (12/18/17 1210) Vital Signs (24h Range):  Temp:  [97.1 °F (36.2 °C)-99.3 °F (37.4 °C)] 98.8 °F (37.1 °C)  Pulse:  [] 99  Resp:  [16-18] 18  SpO2:  [97 %-100 %] 99 %  BP: (104-139)/(54-89) 112/60     Weight: 62.3 kg (137 lb 5.6 oz)  Body mass index is 22.86 kg/m².    Intake/Output Summary (Last 24 hours) at 12/18/17 1533  Last data filed at 12/18/17 0045   Gross per 24 hour   Intake            892.5 ml   Output                0 ml   Net            892.5 ml      Physical Exam   Constitutional: She appears well-developed and well-nourished.   HENT:   Head: Normocephalic and atraumatic.   Eyes: Conjunctivae are normal. Pupils are equal, round, and reactive to light. Scleral icterus is present.   Neck: Normal range of motion. No thyromegaly present.   Cardiovascular: Normal rate, regular rhythm and normal heart sounds.    Pulmonary/Chest: Effort normal and breath sounds normal.   Abdominal: Soft. She exhibits distension. There is no tenderness.   Musculoskeletal: Normal range of motion. She exhibits no edema.   Neurological: She is alert.   AAO times 2   Skin: No erythema. No pallor.       MELD-Na score: 34 at 12/18/2017  4:18 AM  MELD score: 30 at 12/18/2017  4:18 AM  Calculated from:  Serum Creatinine: 0.7 mg/dL (Rounded to 1) at 12/18/2017  4:18 AM  Serum Sodium: 126 mmol/L at 12/18/2017  4:18 AM  Total Bilirubin: 28.0 mg/dL at 12/18/2017  4:18 AM  INR(ratio): 2.7 at 12/18/2017  4:18 AM  Age: 52 years    Significant Labs:  CBC:    Recent Labs  Lab 12/17/17  0436 12/18/17  0418   WBC  10.42 11.02   HGB 9.9* 8.8*   HCT 28.3* 25.1*   PLT 83* 93*     CMP:    Recent Labs  Lab 12/17/17  0436 12/17/17  1554 12/18/17  0418   * 126* 126*   K 3.2* 3.9 4.6   CL 90* 91* 96   CO2 23 23 20*   GLU 95 113* 85   BUN 11 13 12   CREATININE 0.8 0.7 0.7   CALCIUM 9.1 8.8 8.9   PROT 6.4 6.6 6.2   ALBUMIN 3.1* 3.1* 2.9*   BILITOT 30.8* 32.8* 28.0*   ALKPHOS 214* 222* 214*   AST 63* 73* 69*   ALT 49* 52* 49*   ANIONGAP 12 12 10   EGFRNONAA >60.0 >60.0 >60.0     PTINR:    Recent Labs  Lab 12/17/17  0436 12/18/17 0418   INR 3.2* 2.7*       Significant Procedures:   Dobutamine Stress Test with Color Flow:   Results for orders placed or performed during the hospital encounter of 10/19/17   Dobutamine Stress Test w/ Color Flow   Result Value Ref Range    EF 73 55 - 65    Diastolic Dysfunction No     Est. PA Systolic Pressure 21.32     Tricuspid Valve Regurgitation TRIVIAL             Assessment / Plan:     * Decompensated hepatic cirrhosis    MELD-Na score: 34 at 12/18/2017  4:18 AM  MELD score: 30 at 12/18/2017  4:18 AM  Calculated from:  Serum Creatinine: 0.7 mg/dL (Rounded to 1) at 12/18/2017  4:18 AM  Serum Sodium: 126 mmol/L at 12/18/2017  4:18 AM  Total Bilirubin: 28.0 mg/dL at 12/18/2017  4:18 AM  INR(ratio): 2.7 at 12/18/2017  4:18 AM  Age: 52 years   - had half of her evaluation completed on last admission  - AS per Psychiatry patient is moderate risk, will proceed with Liver transplant evaluation  - EGD performed, esophageal varices banded,   - Mammogram performed on 12/4/17   - C-scope repeated on 12/5- good prep  - accepted for liver transplant pending Core biopsy of the Breast        Transplant donor evaluation    Presented at Liver selection committee on 12/06/17, approved pending Breast core biopsy to rule out malignancy          Immunosuppressed status    On chronic prednisone for alcoholic hepatitis; Prednisone has been weaned off, last dose of 5mg of prednisone was on 12/15          Coagulopathy     INR 2.7 after 2 units of FFPs          Alcoholic cirrhosis of liver with ascites    - see decompensated cirrhosis   - IR paracentesis on 12/12 removed 5 liters         Cytomegalovirus (CMV) viremia    -- ID evaluated and do not feel it needs to be treated        Thrombocytopenia    -- monitor           Abnormal liver enzymes    -- Stable  -- Etiology liver failure; see that section for more details          Alcoholic hepatitis with ascites- decompensated with elevated MELD    - cont tapering prednisone, currently at 5 mg daily         Hyponatremia    - on 500 cc fluid restriction; on albumin  - 116-->118-->120-->122-->123-->126-->125-->127-->131-->133-->136-->135-->132-->129-->127-->126        End stage liver disease              HCC (hepatocellular carcinoma)    AFP 9; seen on CT on 10/19, 1 cm focus, monitor for now          Hepatic encephalopathy    -- mental status improved considerably after lactulose enemas         Severe protein-calorie malnutrition    PAB, boost and dietary consult          Ascites    -- Holding diuretics at present, s/p 2L removed on 11/22 with testing negative for SBP  -- No abdominal pain today    - IR paracentesis on 12/12/17 removed 5.2 liters               Essential hypertension    - Stable  - Holding home lisinopril in setting of decompensated liver disease          Anemia of chronic disease    - monitor daily                VTE Risk Mitigation         Ordered     Place sequential compression device  Until discontinued      11/24/17 0734     Place FABIOLA hose  Until discontinued      11/23/17 2305     Medium Risk of VTE  Once      11/23/17 2305           Discharge Disposition: pending Breast Biopsy and Liver transplant     Time taken to evaluate, plan, and coordinate patient care: 35 minutes.    Surinder Madrigal MD  Department of Hospital Medicine  [facility]

## 2017-12-18 NOTE — ASSESSMENT & PLAN NOTE
Marcie Bazan is a 52 y.o. female with a history of ESLD 2/2 to alcoholic hepatitis who decompensated while being in rehab. She has completed the inpatient evaluation and approved for listing based on breast core biopsy results.     Recommendations  1. Continue current supportive care  2. Approved for listing pending core needle breast biopsy

## 2017-12-18 NOTE — PLAN OF CARE
Problem: Patient Care Overview  Goal: Plan of Care Review  Outcome: Ongoing (interventions implemented as appropriate)  Pt is AOx3; occasionally disoriented to time. AVSS throughout shift. Administered 1u FFP. Pt NPO since MN for breast biopsy today. No acute events/changes occurred. WCTM.

## 2017-12-18 NOTE — PROGRESS NOTES
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 Patient presented to [facility] with generalized weakness and was admitted to  11/10 for hyponatremia seen on labs in GI office. Discharged on 11/15 and returned on 11/20 with 1 week worsening weakness, malaise, lethargy, and decreased appetite. Denied fever/chills, abdominal pain, bright red or black tarry stools, hematemesis or other abnormal bleeding, confusion or disorientation, changes in urination, light-headedness, headache, changes in vision, dyspnea, chest pain or palpitations. Labs demonstrated Na 116 and MELD 32; patient admitted for hyponatremia. Nephrology consulted, recommended fluid restriction , holding diuretics, and prednisode taper that started 11/23 (decreased from 40mg QD to 20mg Qd.) Patient transferred to [facility] for revaluation of liver disease. Of note, paracentesis performed 11/22, removed 2L, negative for SBP.     Follows with Dr. Souza for cirrhosis. Since early 09/2017, patient's liver failure progressing indicated by worsening clinical signs (abdominal distention, weakness/fatigue) and MELD. She had been evaluated by [facility] liver transplant committee 10/25/2017 and declined transplant candidacy 2/2 continued EtOH use and lack of solid caregiver support/plan.She has since been enrolled in in-patient substance abuse program at [facility] where she has remained since early November; per patient, her last EtOH beverage was  "11/01/2017.  Per chart review on 11/22/17:     "Patient's case was discussed in liver selection committee today. Per committee discussion, the SW will call patient's daughter to discuss caregiver plan at this time. If the caregiver plan is adequate, patient will need to follow up with either/both addiction psych or SW regarding substance abuse and plan. May need to do this inpatient if patient is still admitted."        Denies history of GI bleed, SBP, renal disease, or other complications of cirrhosis. Liver biopsy 10/24 demonstrated stage 4 cirrhosis. Records reports EGD in August 2016 that showed small esophageal varices with severe gastritis. Colonoscopy 2015 with 2 polyps and internal hemorrhoids Started on prednisone 40mg QD on 10/25; was on spironolactone, has been held since 11/10 for hypoNa; on lactulose.    Interval History:   Pending breast biopsy.     Current Facility-Administered Medications   Medication    0.9%  NaCl infusion (for blood administration)    0.9%  NaCl infusion (for blood administration)    GI cocktail (mylanta 30 mL, lidocaine 2 % viscous 10 mL, dicyclomine 10 mL) 50 mL    lactulose 20 gram/30 mL solution Soln 30 g    magnesium sulfate 2g in water 50mL IVPB (premix)    midodrine tablet 5 mg    ondansetron disintegrating tablet 4 mg    ondansetron injection 4 mg    pantoprazole EC tablet 40 mg    potassium chloride SA CR tablet 20 mEq    rifAXIMin tablet 550 mg    simethicone chewable tablet 80 mg    sodium chloride 0.9% flush 3 mL    sodium chloride 0.9% flush 3 mL    sodium chloride 0.9% flush 5 mL    zinc sulfate capsule 220 mg       Objective:     Vital Signs (Most Recent):  Temp: 99 °F (37.2 °C) (12/18/17 1556)  Pulse: 105 (12/18/17 1556)  Resp: 16 (12/18/17 1556)  BP: 125/72 (12/18/17 1556)  SpO2: 100 % (12/18/17 1556) Vital Signs (24h Range):  Temp:  [97.1 °F (36.2 °C)-99.3 °F (37.4 °C)] 99 °F (37.2 °C)  Pulse:  [] 105  Resp:  [16-18] 16  SpO2:  [97 %-100 %] " 100 %  BP: (104-139)/(54-89) 125/72     Weight: 62.3 kg (137 lb 5.6 oz) (12/17/17 2121)  Body mass index is 22.86 kg/m².    Physical Exam   Constitutional: She is oriented to person, place, and time. She appears ill.   Eyes: Scleral icterus is present.   Cardiovascular: Normal rate.  Exam reveals no friction rub.    Pulmonary/Chest: Effort normal. No respiratory distress.   Abdominal: Soft. Bowel sounds are normal. She exhibits distension. There is no tenderness. There is no rebound and no guarding.   Musculoskeletal: She exhibits edema.   Neurological: She is alert and oriented to person, place, and time.       MELD-Na score: 34 at 12/18/2017  4:18 AM  MELD score: 30 at 12/18/2017  4:18 AM  Calculated from:  Serum Creatinine: 0.7 mg/dL (Rounded to 1) at 12/18/2017  4:18 AM  Serum Sodium: 126 mmol/L at 12/18/2017  4:18 AM  Total Bilirubin: 28.0 mg/dL at 12/18/2017  4:18 AM  INR(ratio): 2.7 at 12/18/2017  4:18 AM  Age: 52 years    Lab Results   Component Value Date    WBC 11.02 12/18/2017    HGB 8.8 (L) 12/18/2017    HCT 25.1 (L) 12/18/2017    MCV 91 12/18/2017    PLT 93 (L) 12/18/2017     Lab Results   Component Value Date    INR 2.7 (H) 12/18/2017     Lab Results   Component Value Date     (L) 12/18/2017    K 4.6 12/18/2017    CREATININE 0.7 12/18/2017     Lab Results   Component Value Date    ALBUMIN 2.9 (L) 12/18/2017    ALT 49 (H) 12/18/2017    AST 69 (H) 12/18/2017     (H) 10/20/2017    ALKPHOS 214 (H) 12/18/2017    BILITOT 28.0 (H) 12/18/2017     Lab Results   Component Value Date    AFP 9.0 (H) 11/24/2017       Imaging:  Reviewed and as noted in HPI.         Assessment/Plan:     * Decompensated hepatic cirrhosis    Marcie Bazan is a 52 y.o. female with a history of ESLD 2/2 to alcoholic hepatitis who decompensated while being in rehab. She has completed the inpatient evaluation and approved for listing based on breast core biopsy results.     Recommendations  1. Continue current supportive  care  2. Approved for listing pending core needle breast biopsy            Thank you for your consult. I will follow-up with patient. Please contact us if you have any additional questions.    Ginna Ren MD  Hepatology  [facility]

## 2017-12-18 NOTE — SUBJECTIVE & OBJECTIVE
Interval History:   Pending breast biopsy.     Current Facility-Administered Medications   Medication    0.9%  NaCl infusion (for blood administration)    0.9%  NaCl infusion (for blood administration)    GI cocktail (mylanta 30 mL, lidocaine 2 % viscous 10 mL, dicyclomine 10 mL) 50 mL    lactulose 20 gram/30 mL solution Soln 30 g    magnesium sulfate 2g in water 50mL IVPB (premix)    midodrine tablet 5 mg    ondansetron disintegrating tablet 4 mg    ondansetron injection 4 mg    pantoprazole EC tablet 40 mg    potassium chloride SA CR tablet 20 mEq    rifAXIMin tablet 550 mg    simethicone chewable tablet 80 mg    sodium chloride 0.9% flush 3 mL    sodium chloride 0.9% flush 3 mL    sodium chloride 0.9% flush 5 mL    zinc sulfate capsule 220 mg       Objective:     Vital Signs (Most Recent):  Temp: 99 °F (37.2 °C) (12/18/17 1556)  Pulse: 105 (12/18/17 1556)  Resp: 16 (12/18/17 1556)  BP: 125/72 (12/18/17 1556)  SpO2: 100 % (12/18/17 1556) Vital Signs (24h Range):  Temp:  [97.1 °F (36.2 °C)-99.3 °F (37.4 °C)] 99 °F (37.2 °C)  Pulse:  [] 105  Resp:  [16-18] 16  SpO2:  [97 %-100 %] 100 %  BP: (104-139)/(54-89) 125/72     Weight: 62.3 kg (137 lb 5.6 oz) (12/17/17 2121)  Body mass index is 22.86 kg/m².    Physical Exam   Constitutional: She is oriented to person, place, and time. She appears ill.   Eyes: Scleral icterus is present.   Cardiovascular: Normal rate.  Exam reveals no friction rub.    Pulmonary/Chest: Effort normal. No respiratory distress.   Abdominal: Soft. Bowel sounds are normal. She exhibits distension. There is no tenderness. There is no rebound and no guarding.   Musculoskeletal: She exhibits edema.   Neurological: She is alert and oriented to person, place, and time.       MELD-Na score: 34 at 12/18/2017  4:18 AM  MELD score: 30 at 12/18/2017  4:18 AM  Calculated from:  Serum Creatinine: 0.7 mg/dL (Rounded to 1) at 12/18/2017  4:18 AM  Serum Sodium: 126 mmol/L at 12/18/2017  4:18  AM  Total Bilirubin: 28.0 mg/dL at 12/18/2017  4:18 AM  INR(ratio): 2.7 at 12/18/2017  4:18 AM  Age: 52 years    Lab Results   Component Value Date    WBC 11.02 12/18/2017    HGB 8.8 (L) 12/18/2017    HCT 25.1 (L) 12/18/2017    MCV 91 12/18/2017    PLT 93 (L) 12/18/2017     Lab Results   Component Value Date    INR 2.7 (H) 12/18/2017     Lab Results   Component Value Date     (L) 12/18/2017    K 4.6 12/18/2017    CREATININE 0.7 12/18/2017     Lab Results   Component Value Date    ALBUMIN 2.9 (L) 12/18/2017    ALT 49 (H) 12/18/2017    AST 69 (H) 12/18/2017     (H) 10/20/2017    ALKPHOS 214 (H) 12/18/2017    BILITOT 28.0 (H) 12/18/2017     Lab Results   Component Value Date    AFP 9.0 (H) 11/24/2017       Imaging:  Reviewed and as noted in HPI.

## 2017-12-19 ENCOUNTER — CONFERENCE (OUTPATIENT)
Dept: TRANSPLANT | Facility: CLINIC | Age: 52
End: 2017-12-19

## 2017-12-19 LAB
ALBUMIN SERPL BCP-MCNC: 2.8 G/DL
ALBUMIN SERPL BCP-MCNC: 2.9 G/DL
ALP SERPL-CCNC: 212 U/L
ALP SERPL-CCNC: 231 U/L
ALT SERPL W/O P-5'-P-CCNC: 44 U/L
ALT SERPL W/O P-5'-P-CCNC: 45 U/L
ANION GAP SERPL CALC-SCNC: 10 MMOL/L
ANION GAP SERPL CALC-SCNC: 9 MMOL/L
APPEARANCE FLD: CLEAR
AST SERPL-CCNC: 65 U/L
AST SERPL-CCNC: 69 U/L
BASOPHILS # BLD AUTO: 0.06 K/UL
BASOPHILS NFR BLD: 0.6 %
BILIRUB SERPL-MCNC: 26.8 MG/DL
BILIRUB SERPL-MCNC: 28 MG/DL
BODY FLD TYPE: NORMAL
BUN SERPL-MCNC: 10 MG/DL
BUN SERPL-MCNC: 10 MG/DL
CALCIUM SERPL-MCNC: 8.8 MG/DL
CALCIUM SERPL-MCNC: 9 MG/DL
CHLORIDE SERPL-SCNC: 95 MMOL/L
CHLORIDE SERPL-SCNC: 96 MMOL/L
CO2 SERPL-SCNC: 21 MMOL/L
CO2 SERPL-SCNC: 21 MMOL/L
COLOR FLD: YELLOW
CREAT SERPL-MCNC: 0.8 MG/DL
CREAT SERPL-MCNC: 0.9 MG/DL
DIFFERENTIAL METHOD: ABNORMAL
EOSINOPHIL # BLD AUTO: 0 K/UL
EOSINOPHIL NFR BLD: 0.4 %
ERYTHROCYTE [DISTWIDTH] IN BLOOD BY AUTOMATED COUNT: 18.7 %
EST. GFR  (AFRICAN AMERICAN): >60 ML/MIN/1.73 M^2
EST. GFR  (AFRICAN AMERICAN): >60 ML/MIN/1.73 M^2
EST. GFR  (NON AFRICAN AMERICAN): >60 ML/MIN/1.73 M^2
EST. GFR  (NON AFRICAN AMERICAN): >60 ML/MIN/1.73 M^2
GLUCOSE SERPL-MCNC: 87 MG/DL
GLUCOSE SERPL-MCNC: 93 MG/DL
HCT VFR BLD AUTO: 25.1 %
HGB BLD-MCNC: 8.8 G/DL
IMM GRANULOCYTES # BLD AUTO: 0.08 K/UL
IMM GRANULOCYTES NFR BLD AUTO: 0.8 %
INR PPP: 2.3
LYMPHOCYTES # BLD AUTO: 3.3 K/UL
LYMPHOCYTES NFR BLD: 34.4 %
LYMPHOCYTES NFR FLD MANUAL: 26 %
MAGNESIUM SERPL-MCNC: 2.1 MG/DL
MCH RBC QN AUTO: 31.9 PG
MCHC RBC AUTO-ENTMCNC: 35.1 G/DL
MCV RBC AUTO: 91 FL
MONOCYTES # BLD AUTO: 1.2 K/UL
MONOCYTES NFR BLD: 12.2 %
MONOS+MACROS NFR FLD MANUAL: 12 %
NEUTROPHILS # BLD AUTO: 5 K/UL
NEUTROPHILS NFR BLD: 51.6 %
NEUTROPHILS NFR FLD MANUAL: 62 %
NRBC BLD-RTO: 0 /100 WBC
PHOSPHATE SERPL-MCNC: 2.5 MG/DL
PLATELET # BLD AUTO: 100 K/UL
PMV BLD AUTO: 10.4 FL
POTASSIUM SERPL-SCNC: 3.6 MMOL/L
POTASSIUM SERPL-SCNC: 3.8 MMOL/L
PROT SERPL-MCNC: 6.1 G/DL
PROT SERPL-MCNC: 6.3 G/DL
PROTHROMBIN TIME: 23.3 SEC
RBC # BLD AUTO: 2.76 M/UL
SODIUM SERPL-SCNC: 126 MMOL/L
SODIUM SERPL-SCNC: 126 MMOL/L
WBC # BLD AUTO: 9.62 K/UL
WBC # FLD: 362 /CU MM

## 2017-12-19 PROCEDURE — 99233 SBSQ HOSP IP/OBS HIGH 50: CPT | Mod: ,,, | Performed by: HOSPITALIST

## 2017-12-19 PROCEDURE — 84100 ASSAY OF PHOSPHORUS: CPT

## 2017-12-19 PROCEDURE — 85610 PROTHROMBIN TIME: CPT

## 2017-12-19 PROCEDURE — 36415 COLL VENOUS BLD VENIPUNCTURE: CPT

## 2017-12-19 PROCEDURE — 85025 COMPLETE CBC W/AUTO DIFF WBC: CPT

## 2017-12-19 PROCEDURE — 83735 ASSAY OF MAGNESIUM: CPT

## 2017-12-19 PROCEDURE — 25000003 PHARM REV CODE 250: Performed by: STUDENT IN AN ORGANIZED HEALTH CARE EDUCATION/TRAINING PROGRAM

## 2017-12-19 PROCEDURE — 0W9G3ZZ DRAINAGE OF PERITONEAL CAVITY, PERCUTANEOUS APPROACH: ICD-10-PCS | Performed by: RADIOLOGY

## 2017-12-19 PROCEDURE — 80053 COMPREHEN METABOLIC PANEL: CPT

## 2017-12-19 PROCEDURE — 25000003 PHARM REV CODE 250: Performed by: HOSPITALIST

## 2017-12-19 PROCEDURE — 20600001 HC STEP DOWN PRIVATE ROOM

## 2017-12-19 PROCEDURE — 89051 BODY FLUID CELL COUNT: CPT

## 2017-12-19 RX ORDER — MIDODRINE HYDROCHLORIDE 2.5 MG/1
2.5 TABLET ORAL 3 TIMES DAILY
Status: DISCONTINUED | OUTPATIENT
Start: 2017-12-19 | End: 2017-12-21

## 2017-12-19 RX ADMIN — LACTULOSE 30 G: 20 SOLUTION ORAL at 09:12

## 2017-12-19 RX ADMIN — LACTULOSE 30 G: 20 SOLUTION ORAL at 05:12

## 2017-12-19 RX ADMIN — PANTOPRAZOLE SODIUM 40 MG: 40 TABLET, DELAYED RELEASE ORAL at 09:12

## 2017-12-19 RX ADMIN — POTASSIUM CHLORIDE 20 MEQ: 1500 TABLET, EXTENDED RELEASE ORAL at 09:12

## 2017-12-19 RX ADMIN — RIFAXIMIN 550 MG: 550 TABLET ORAL at 09:12

## 2017-12-19 RX ADMIN — MIDODRINE HYDROCHLORIDE 2.5 MG: 2.5 TABLET ORAL at 09:12

## 2017-12-19 RX ADMIN — Medication 220 MG: at 09:12

## 2017-12-19 RX ADMIN — LACTULOSE 30 G: 20 SOLUTION ORAL at 03:12

## 2017-12-19 RX ADMIN — MIDODRINE HYDROCHLORIDE 5 MG: 5 TABLET ORAL at 05:12

## 2017-12-19 RX ADMIN — MIDODRINE HYDROCHLORIDE 2.5 MG: 2.5 TABLET ORAL at 03:12

## 2017-12-19 NOTE — PLAN OF CARE
12/19/17 1250   Discharge Reassessment   Assessment Type Discharge Planning Reassessment   Provided patient/caregiver education on the expected discharge date and the discharge plan Yes   Do you have any problems affording any of your prescribed medications? TBD   Discharge Plan A (transfer to LTS)   Discharge Plan B Home with family;Home Health   Can the patient answer the patient profile reliably? Yes, cognitively intact   How does the patient rate their overall health at the present time? Fair   Describe the patient's ability to walk at the present time. Minor restrictions or changes   How often would a person be available to care for the patient? Often   Number of comorbid conditions (as recorded on the chart) Five or more   During the past month, has the patient often been bothered by feeling down, depressed or hopeless? No   During the past month, has the patient often been bothered by little interest or pleasure in doing things? No

## 2017-12-19 NOTE — PROCEDURES
Radiology Post-Procedure Note    Pre Op Diagnosis: Ascites  Post Op Diagnosis: Same    Procedure: Paracentesis    Procedure performed by: Dr. Byers and Dr. Daily    Written Informed Consent Obtained: Yes  Specimen Removed: YES ascites  Estimated Blood Loss: Minimal    Findings:   Successful paracentesis.  Albumin administered PRN per protocol.    Patient tolerated procedure well.    Eusebio Daily MD  PGY-5  Department of Radiology  [phone]

## 2017-12-19 NOTE — H&P
Inpatient Radiology Pre-procedure Note    History of Present Illness:    Marcie Bazan is a 52 y.o. female with decompensated cirrhosis and ascites who presents for paracentesis.    Admission H&P reviewed.    Past Medical History:   Diagnosis Date    Alcohol abuse     Alcoholic hepatitis     Anemia of chronic disease     Coagulopathy     End stage liver disease     Hypertension     Hyponatremia     Liver cirrhosis     Liver transplant candidate      Past Surgical History:   Procedure Laterality Date    BREAST BIOPSY      CHOLECYSTECTOMY      COLONOSCOPY N/A 12/1/2017    Procedure: COLONOSCOPY;  Surgeon: Filemon Fuentes MD;  [facility]  Service: Endoscopy;  Laterality: N/A;    COLONOSCOPY N/A 12/5/2017    Procedure: COLONOSCOPY;  Surgeon: José Chavira MD;  Location: [facility] ENDO ([address] [place] [address]);  Service: Endoscopy;  Laterality: N/A;    HYSTERECTOMY         Review of Systems:   As documented in primary team H&P    Home Meds:   Prior to Admission medications    Medication Sig Start Date End Date Taking? Authorizing Provider   cetirizine (ZYRTEC) 10 MG tablet Take 10 mg by mouth once daily. 2/9/17 2/9/18 Yes Historical Provider, MD   furosemide (LASIX) 40 MG tablet Take 40 mg by mouth daily as needed (for fluid).   Yes Historical Provider, MD   lactulose (CHRONULAC) 10 gram/15 mL solution Take 45 ml by mouth four times daily 11/7/17  Yes Historical Provider, MD   omeprazole (PRILOSEC) 40 MG capsule Take 40 mg by mouth once daily. 3/8/17  Yes Historical Provider, MD   ondansetron (ZOFRAN-ODT) 4 MG TbDL Take 1 tablet (4 mg total) by mouth every 8 (eight) hours as needed (nausea and vomiting). 9/15/17  Yes Bisi Jonas NP   potassium chloride SA (K-DUR,KLOR-CON) 20 MEQ tablet Taking only when taking lasix 11/15/17  Yes Vira Amador NP   predniSONE (DELTASONE) 20 MG tablet Take 40 mg by mouth once daily.   Yes Historical Provider, MD   lisinopril (PRINIVIL,ZESTRIL) 5 MG tablet  Take 1 tablet by mouth only as needed for high blood pressure 6/8/17   Historical Provider, MD   spironolactone (ALDACTONE) 100 MG tablet Hold until follow-up with GI 11/15/17   Vira Amador NP     Scheduled Meds:    lactulose  30 g Oral TID    midodrine  2.5 mg Oral TID    pantoprazole  40 mg Oral Daily    potassium chloride SA  20 mEq Oral Daily    rifAXImin  550 mg Oral BID    zinc sulfate  220 mg Oral Daily     Continuous Infusions:   PRN Meds:GI cocktail (mylanta 30 mL, lidocaine 2 % viscous 10 mL, dicyclomine 10 mL) 50 mL, ondansetron, ondansetron, simethicone, sodium chloride 0.9%, sodium chloride 0.9%, sodium chloride 0.9%     Anticoagulants/Antiplatelets: no anticoagulation    Allergies:   Review of patient's allergies indicates:   Allergen Reactions    Ferrous sulfate Other (See Comments)     Patient states the pill makes her sick. She stated she would rather have a shot     Sedation Hx: have not been any systemic reactions    Labs:    Recent Labs  Lab 12/19/17  0436   INR 2.3*       Recent Labs  Lab 12/19/17  0436   WBC 9.62   HGB 8.8*   HCT 25.1*   MCV 91   *      Recent Labs  Lab 12/19/17  0436   GLU 87   *   K 3.6   CL 95   CO2 21*   BUN 10   CREATININE 0.9   CALCIUM 8.8   MG 2.1   ALT 45*   AST 65*   ALBUMIN 2.9*   BILITOT 26.8*         Vitals:  Temp: 98.4 °F (36.9 °C) (12/19/17 1132)  Pulse: 102 (12/19/17 1132)  Resp: 17 (12/19/17 1132)  BP: 128/67 (12/19/17 1132)  SpO2: 100 % (12/19/17 1132)     Physical Exam:    General: no acute distress  Mental Status: alert and oriented to person, place and time  HEENT: normocephalic, atraumatic  Chest: unlabored breathing  Heart: regular heart rate  Abdomen: distended  Extremity: moves all extremities    Plan: paracentesis  Sedation Plan: local    Eusebio Daily MD  PGY-5  Department of Radiology  [phone]

## 2017-12-19 NOTE — TELEPHONE ENCOUNTER
Patient's case discussed at Liver Discussion Committee.  Plan- proceed with breast biopsy as per original recommendation to rule out breast cancer.  In patient team to investigate who would be best to do this procedure.

## 2017-12-20 ENCOUNTER — CONFERENCE (OUTPATIENT)
Dept: TRANSPLANT | Facility: CLINIC | Age: 52
End: 2017-12-20

## 2017-12-20 ENCOUNTER — TELEPHONE (OUTPATIENT)
Dept: TRANSPLANT | Facility: CLINIC | Age: 52
End: 2017-12-20

## 2017-12-20 LAB
ALBUMIN SERPL BCP-MCNC: 2.7 G/DL
ALBUMIN SERPL BCP-MCNC: 2.7 G/DL
ALP SERPL-CCNC: 220 U/L
ALP SERPL-CCNC: 247 U/L
ALT SERPL W/O P-5'-P-CCNC: 42 U/L
ALT SERPL W/O P-5'-P-CCNC: 42 U/L
ANION GAP SERPL CALC-SCNC: 9 MMOL/L
ANION GAP SERPL CALC-SCNC: 9 MMOL/L
AST SERPL-CCNC: 58 U/L
AST SERPL-CCNC: 62 U/L
BACTERIA BLD CULT: NORMAL
BASOPHILS # BLD AUTO: 0.06 K/UL
BASOPHILS NFR BLD: 0.5 %
BILIRUB SERPL-MCNC: 25.8 MG/DL
BILIRUB SERPL-MCNC: 26.9 MG/DL
BUN SERPL-MCNC: 9 MG/DL
BUN SERPL-MCNC: 9 MG/DL
CALCIUM SERPL-MCNC: 8.8 MG/DL
CALCIUM SERPL-MCNC: 8.9 MG/DL
CHLORIDE SERPL-SCNC: 96 MMOL/L
CHLORIDE SERPL-SCNC: 96 MMOL/L
CO2 SERPL-SCNC: 20 MMOL/L
CO2 SERPL-SCNC: 21 MMOL/L
CREAT SERPL-MCNC: 0.7 MG/DL
CREAT SERPL-MCNC: 0.9 MG/DL
DIFFERENTIAL METHOD: ABNORMAL
EOSINOPHIL # BLD AUTO: 0 K/UL
EOSINOPHIL NFR BLD: 0.3 %
ERYTHROCYTE [DISTWIDTH] IN BLOOD BY AUTOMATED COUNT: 17.8 %
EST. GFR  (AFRICAN AMERICAN): >60 ML/MIN/1.73 M^2
EST. GFR  (AFRICAN AMERICAN): >60 ML/MIN/1.73 M^2
EST. GFR  (NON AFRICAN AMERICAN): >60 ML/MIN/1.73 M^2
EST. GFR  (NON AFRICAN AMERICAN): >60 ML/MIN/1.73 M^2
GLUCOSE SERPL-MCNC: 103 MG/DL
GLUCOSE SERPL-MCNC: 106 MG/DL
HCT VFR BLD AUTO: 25.9 %
HGB BLD-MCNC: 9.2 G/DL
IMM GRANULOCYTES # BLD AUTO: 0.06 K/UL
IMM GRANULOCYTES NFR BLD AUTO: 0.5 %
INR PPP: 2.8
LYMPHOCYTES # BLD AUTO: 3.8 K/UL
LYMPHOCYTES NFR BLD: 34.1 %
MAGNESIUM SERPL-MCNC: 1.5 MG/DL
MCH RBC QN AUTO: 31.3 PG
MCHC RBC AUTO-ENTMCNC: 35.5 G/DL
MCV RBC AUTO: 88 FL
MONOCYTES # BLD AUTO: 1.5 K/UL
MONOCYTES NFR BLD: 13.3 %
NEUTROPHILS # BLD AUTO: 5.7 K/UL
NEUTROPHILS NFR BLD: 51.3 %
NRBC BLD-RTO: 0 /100 WBC
PHOSPHATE SERPL-MCNC: 3 MG/DL
PLATELET # BLD AUTO: 108 K/UL
PMV BLD AUTO: 11 FL
POTASSIUM SERPL-SCNC: 3.4 MMOL/L
POTASSIUM SERPL-SCNC: 3.4 MMOL/L
PROT SERPL-MCNC: 6.1 G/DL
PROT SERPL-MCNC: 6.2 G/DL
PROTHROMBIN TIME: 27.9 SEC
RBC # BLD AUTO: 2.94 M/UL
SODIUM SERPL-SCNC: 125 MMOL/L
SODIUM SERPL-SCNC: 126 MMOL/L
WBC # BLD AUTO: 11.2 K/UL

## 2017-12-20 PROCEDURE — 99233 SBSQ HOSP IP/OBS HIGH 50: CPT | Mod: ,,, | Performed by: HOSPITALIST

## 2017-12-20 PROCEDURE — 83735 ASSAY OF MAGNESIUM: CPT

## 2017-12-20 PROCEDURE — 25000003 PHARM REV CODE 250: Performed by: STUDENT IN AN ORGANIZED HEALTH CARE EDUCATION/TRAINING PROGRAM

## 2017-12-20 PROCEDURE — 25000003 PHARM REV CODE 250: Performed by: HOSPITALIST

## 2017-12-20 PROCEDURE — 36415 COLL VENOUS BLD VENIPUNCTURE: CPT

## 2017-12-20 PROCEDURE — 97803 MED NUTRITION INDIV SUBSEQ: CPT

## 2017-12-20 PROCEDURE — 85025 COMPLETE CBC W/AUTO DIFF WBC: CPT

## 2017-12-20 PROCEDURE — 85610 PROTHROMBIN TIME: CPT

## 2017-12-20 PROCEDURE — 20600001 HC STEP DOWN PRIVATE ROOM

## 2017-12-20 PROCEDURE — 84100 ASSAY OF PHOSPHORUS: CPT

## 2017-12-20 PROCEDURE — 80053 COMPREHEN METABOLIC PANEL: CPT | Mod: 91

## 2017-12-20 RX ORDER — HYDROCORTISONE ACETATE PRAMOXINE HCL 1; 1 G/100G; G/100G
CREAM TOPICAL 2 TIMES DAILY
Status: DISCONTINUED | OUTPATIENT
Start: 2017-12-20 | End: 2017-12-26

## 2017-12-20 RX ADMIN — RIFAXIMIN 550 MG: 550 TABLET ORAL at 09:12

## 2017-12-20 RX ADMIN — MIDODRINE HYDROCHLORIDE 2.5 MG: 2.5 TABLET ORAL at 03:12

## 2017-12-20 RX ADMIN — MIDODRINE HYDROCHLORIDE 2.5 MG: 2.5 TABLET ORAL at 09:12

## 2017-12-20 RX ADMIN — LACTULOSE 30 G: 20 SOLUTION ORAL at 09:12

## 2017-12-20 RX ADMIN — MIDODRINE HYDROCHLORIDE 2.5 MG: 2.5 TABLET ORAL at 05:12

## 2017-12-20 RX ADMIN — POTASSIUM CHLORIDE 20 MEQ: 1500 TABLET, EXTENDED RELEASE ORAL at 09:12

## 2017-12-20 RX ADMIN — Medication 220 MG: at 09:12

## 2017-12-20 RX ADMIN — LACTULOSE 30 G: 20 SOLUTION ORAL at 05:12

## 2017-12-20 RX ADMIN — PANTOPRAZOLE SODIUM 40 MG: 40 TABLET, DELAYED RELEASE ORAL at 09:12

## 2017-12-20 RX ADMIN — HYDROCORTISONE ACETATE AND PRAMOXINE HYDROCHLORIDE: 10; 10 CREAM TOPICAL at 09:12

## 2017-12-20 RX ADMIN — LACTULOSE 30 G: 20 SOLUTION ORAL at 03:12

## 2017-12-20 RX ADMIN — POTASSIUM BICARBONATE 50 MEQ: 25 TABLET, EFFERVESCENT ORAL at 11:12

## 2017-12-20 NOTE — PROGRESS NOTES
[facility] Hospital Medicine  Progress Note    Patient Name: Marcie Bazan  MRN: [id number]  Patient Class: IP- Inpatient   Admission Date: 11/23/2017  Length of Stay: 26 days  Attending Physician: Burton Vora MD  Primary Care Provider: Milton Ferrer, [facility] Medicine Team: [facility] Burton Vora MD    Subjective:     Principal Problem:Decompensated hepatic cirrhosis    HPI:  52 year old female with PMHx of 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 Patient presented to [facility] with generalized weakness. Onset two months ago, progressively worsening. Was admitted to  11/10 for hyponatremia seen on labs in GI office. Discharged on 11/15 and returned on 11/20 with 1 week worsening weakness, malaise, lethargy, and decreased appetite. Denied fever/chills, abdominal pain, bright red or black tarry stools, hematemesis or other abnormal bleeding, confusion or disorientation, changes in urination, light-headedness, headache, changes in vision, dyspnea, chest pain or palpitations. Labs demonstrated Na 116 and MELD 32; patient admitted for hyponatremia. Nephrology consulted, recommended fluid restriction , holding diuretics, and prednisode taper that started 11/23 (decreased from 40mg QD to 20mg Qd.) Patient transferred to [facility] for revaluation of liver disease. Of note, paracentesis performed 11/22, removed 2L, negative for SBP.    Follows with Dr. Souza for cirrhosis. Since early 09/2017, patient's liver failure progressing indicated by worsening clinical signs (abdominal distention, weakness/fatigue) and MELD. In mid-October, patient's PETH demonstrated EtOH use and she was denied candidacy for liver transplant. She was then enrolled in in-patient substance abuse program at [facility] where she has remained since early  November; per patient, her last EtOH beverage was 11/01/2017. Denies history of GI bleed, SBP, renal disease, or other complications of cirrhosis. Liver biopsy 10/24 demonstrated stage 4 cirrhosis. Records reports EGD in August 2016 that showed small esophageal varices with severe gastritis. Colonoscopy 2015 with 2 polyps and internal hemorrhoids Started on prednisone 40mg QD on 10/25; was on spironolactone, has been held since 11/10 for hypoNa; on lactulose.    Hospital Course:  Mental status improving with hepatic encephalopathy treatment. Started empiric ceftriaxone given leukocytosis in immunocompromised state. CMV PCR positive overnight, for which ID was consulted, recommended repeating titers in 1 week. Seen by Psychiatry and  who determined she was moderate risk for Liver transplant. Will proceed with EGD/Cscope and mammogram after FFP. Colonoscopy not performed due to poor prep, EGD revealed large varices which were banded. IR paracentesis ordered after patient started complaining of abdominal pain. Mammogram performed, IR paracentesis after INR Reversal negative for SBP. Patient accepted for Liver transplant pending review of previous mammograms.  Patient was slightly confused and altered, which resolved with lactulose enemas. She will need a core Breast Biopsy prior to listing in order to rule out malignancy.       Interval History: patient seen and examined today; AAO times 4; having multiple BMs; complaining of abdominal distension; IR paracentesis done with 4.6 L removed and negative for SBP  - after being discussed in the liver meeting today, they want to proceed with breast biopsy; will plan for Thursday;     Review of Systems   Constitutional: Negative for chills and fever.   HENT: Negative for rhinorrhea and sore throat.    Eyes: Negative for pain and discharge.   Respiratory: Negative for cough and shortness of breath.    Cardiovascular: Negative for chest pain and leg swelling.    Gastrointestinal: Positive for abdominal distention. Negative for abdominal pain, blood in stool, diarrhea, nausea and vomiting.   Endocrine: Negative for cold intolerance and heat intolerance.   Genitourinary: Negative for dysuria and flank pain.   Neurological: Negative for dizziness and numbness.   Psychiatric/Behavioral: Negative for behavioral problems and confusion.     Objective:     Vital Signs (Most Recent):  Temp: 98.4 °F (36.9 °C) (12/19/17 1132)  Pulse: 100 (12/19/17 1439)  Resp: 18 (12/19/17 1439)  BP: 110/62 (12/19/17 1439)  SpO2: 100 % (12/19/17 1439) Vital Signs (24h Range):  Temp:  [98.4 °F (36.9 °C)-99.7 °F (37.6 °C)] 98.4 °F (36.9 °C)  Pulse:  [100-107] 100  Resp:  [16-20] 18  SpO2:  [98 %-100 %] 100 %  BP: (110-136)/(57-79) 110/62     Weight: 62.3 kg (137 lb 5.6 oz)  Body mass index is 22.86 kg/m².    Intake/Output Summary (Last 24 hours) at 12/19/17 1907  Last data filed at 12/19/17 1437   Gross per 24 hour   Intake           337.08 ml   Output             4600 ml   Net         -4262.92 ml      Physical Exam   Constitutional: She is oriented to person, place, and time. She appears well-developed and well-nourished.   HENT:   Head: Normocephalic and atraumatic.   Eyes: Conjunctivae are normal. Pupils are equal, round, and reactive to light. Scleral icterus is present.   Neck: Normal range of motion. No thyromegaly present.   Cardiovascular: Normal rate, regular rhythm and normal heart sounds.    Pulmonary/Chest: Effort normal and breath sounds normal.   Abdominal: Soft. She exhibits distension. There is no tenderness.   Musculoskeletal: Normal range of motion. She exhibits no edema.   Neurological: She is alert and oriented to person, place, and time.   Skin: No erythema. No pallor.       MELD-Na score: 32 at 12/19/2017  3:51 PM  MELD score: 28 at 12/19/2017  3:51 PM  Calculated from:  Serum Creatinine: 0.8 mg/dL (Rounded to 1) at 12/19/2017  3:51 PM  Serum Sodium: 126 mmol/L at 12/19/2017   3:51 PM  Total Bilirubin: 28.0 mg/dL at 12/19/2017  3:51 PM  INR(ratio): 2.3 at 12/19/2017  4:36 AM  Age: 52 years    Significant Labs:  CBC:    Recent Labs  Lab 12/18/17  0418 12/19/17  0436   WBC 11.02 9.62   HGB 8.8* 8.8*   HCT 25.1* 25.1*   PLT 93* 100*     CMP:    Recent Labs  Lab 12/18/17  1541 12/19/17  0436 12/19/17  1551   * 126* 126*   K 4.1 3.6 3.8   CL 94* 95 96   CO2 24 21* 21*   GLU 88 87 93   BUN 11 10 10   CREATININE 0.9 0.9 0.8   CALCIUM 9.2 8.8 9.0   PROT 6.3 6.1 6.3   ALBUMIN 3.0* 2.9* 2.8*   BILITOT 28.1* 26.8* 28.0*   ALKPHOS 217* 212* 231*   AST 70* 65* 69*   ALT 47* 45* 44   ANIONGAP 8 10 9   EGFRNONAA >60.0 >60.0 >60.0     PTINR:    Recent Labs  Lab 12/18/17  0418 12/18/17  1541 12/19/17 0436   INR 2.7* 2.3* 2.3*       Significant Procedures:   Dobutamine Stress Test with Color Flow:   Results for orders placed or performed during the hospital encounter of 10/19/17   Dobutamine Stress Test w/ Color Flow   Result Value Ref Range    EF 73 55 - 65    Diastolic Dysfunction No     Est. PA Systolic Pressure 21.32     Tricuspid Valve Regurgitation TRIVIAL             Assessment/Plan:      * Decompensated hepatic cirrhosis    MELD-Na score: 32 at 12/19/2017  3:51 PM  MELD score: 28 at 12/19/2017  3:51 PM  Calculated from:  Serum Creatinine: 0.8 mg/dL (Rounded to 1) at 12/19/2017  3:51 PM  Serum Sodium: 126 mmol/L at 12/19/2017  3:51 PM  Total Bilirubin: 28.0 mg/dL at 12/19/2017  3:51 PM  INR(ratio): 2.3 at 12/19/2017  4:36 AM  Age: 52 years   - had half of her evaluation completed on last admission  - AS per Psychiatry patient is moderate risk, will proceed with Liver transplant evaluation  - EGD performed, esophageal varices banded,   - Mammogram performed on 12/4/17   - C-scope repeated on 12/5- good prep  - accepted for liver transplant pending Core biopsy of the Breast, plan for Thursday  - IR paracentesis on 12/19- 4.6 L removed and negative for SBP        Transplant donor evaluation     Presented at Liver selection committee on 12/06/17, approved pending Breast core biopsy to rule out malignancy          Immunosuppressed status    On chronic prednisone for alcoholic hepatitis; Prednisone has been weaned off, last dose of 5mg of prednisone was on 12/15          Coagulopathy    INR 2.7 after 2 units of FFPs          Alcoholic cirrhosis of liver with ascites    - see decompensated cirrhosis   - IR paracentesis on 12/12 removed 5 liters         Cytomegalovirus (CMV) viremia    -- ID evaluated and do not feel it needs to be treated        Thrombocytopenia    -- monitor           Abnormal liver enzymes    -- Stable  -- Etiology liver failure; see that section for more details          Alcoholic hepatitis with ascites- decompensated with elevated MELD    - prednisone has been weaned off;         Hyponatremia    - on 500 cc fluid restriction; on albumin  - 116-->118-->120-->122-->123-->126-->125-->127-->131-->133-->136-->135-->132-->129-->127-->126        End stage liver disease              HCC (hepatocellular carcinoma)    AFP 9; seen on CT on 10/19, 1 cm focus, monitor for now          Hepatic encephalopathy    -- doing well; cont to have 3 to 4 BMs daily; cont lactulose and rifaximin and zinc        Severe protein-calorie malnutrition    PAB, boost and dietary consult          Ascites    -- Holding diuretics at present, s/p 2L removed on 11/22 with testing negative for SBP  -- No abdominal pain today    - IR paracentesis on 12/12/17 removed 5.2 liters               Essential hypertension    - Stable  - Holding home lisinopril in setting of decompensated liver disease          Anemia of chronic disease    - monitor daily            VTE Risk Mitigation         Ordered     Place sequential compression device  Until discontinued      11/24/17 0734     Place FABIOLA hose  Until discontinued      11/23/17 2305     Medium Risk of VTE  Once      11/23/17 2305              Burton Vora MD  Department of Hospital  Medicine   [facility]

## 2017-12-20 NOTE — PLAN OF CARE
Problem: Patient Care Overview  Goal: Plan of Care Review  Outcome: Ongoing (interventions implemented as appropriate)  Patient complaining of moderate pain in abdomen. Bowel movement goal met. No new complaints or issues. Paracentesis today, pt tolerated well. Vitals WNL. Fall precautions in place.

## 2017-12-20 NOTE — TELEPHONE ENCOUNTER
Discussion with Dr. Vo regarding utility of breast biopsy based on mammography reading.  Clarification that microcalcifications increase risk for DCIS but very low likelihood of invasive cancer.  In the setting of coagulopathy and procedure risk, benefit is felt to outweigh risk.  Breast biopsy can be performed in post-transplant setting if patient is able to under successful liver transplant.    At this time patient is approved for listing, pending financial approval.

## 2017-12-20 NOTE — PLAN OF CARE
Problem: Patient Care Overview  Goal: Plan of Care Review  Outcome: Ongoing (interventions implemented as appropriate)  Patient has no new complaints today. Vitals WNL. Poor appetite. Adheres for fluid restriction. Multiple bowel movement, meeting daily goal. No new confusion or AMS. Fall precautions in place.

## 2017-12-20 NOTE — ASSESSMENT & PLAN NOTE
MELD-Na score: 32 at 12/19/2017  3:51 PM  MELD score: 28 at 12/19/2017  3:51 PM  Calculated from:  Serum Creatinine: 0.8 mg/dL (Rounded to 1) at 12/19/2017  3:51 PM  Serum Sodium: 126 mmol/L at 12/19/2017  3:51 PM  Total Bilirubin: 28.0 mg/dL at 12/19/2017  3:51 PM  INR(ratio): 2.3 at 12/19/2017  4:36 AM  Age: 52 years   - had half of her evaluation completed on last admission  - AS per Psychiatry patient is moderate risk, will proceed with Liver transplant evaluation  - EGD performed, esophageal varices banded,   - Mammogram performed on 12/4/17   - C-scope repeated on 12/5- good prep  - accepted for liver transplant pending Core biopsy of the Breast, plan for Thursday  - IR paracentesis on 12/19- 4.6 L removed and negative for SBP

## 2017-12-20 NOTE — PROGRESS NOTES
"  [facility]  Adult Nutrition  Consult Note    SUMMARY     Recommendations    1. Continue current 2 gram sodium diet + Boost Plus ONS (fluid restriction per MD).   2. RD to monitor & follow-up.    Goals: PO intake >50%  Nutrition Goal Status: new  Communication of RD Recs: reviewed with RN    Reason for Assessment    Reason for Assessment: RD follow-up  Diagnosis:  (Decompensated hepatic cirrhosis )  Relevent Medical History: EtOH Hepatits, Cirrhosis, ESRD, Tx candidate,    Interdisciplinary Rounds: did not attend     General Information Comments: Pt continues with good appetite, consuming % of meals.  Nutrition Discharge Planning: Adequate PO intake    Nutrition Prescription Ordered    Current Diet Order: 2 gram Na  Nutrition Order Comments: 1000 mL FR     Oral Nutrition Supplement: Boost Plus ONS     Nutrition/Diet History    Patient Reported Diet/Restrictions/Preferences: low salt     Typical Food/Fluid Intake: decreased appetite, but never was a big eater     Food Preferences: No cultural or Faith preferences noted     Factors Affecting Nutritional Intake: other (see comments) (None)    Labs/Tests/Procedures/Meds    Pertinent Labs Reviewed: reviewed  Pertinent Labs Comments: Na 125, Bili 25.8  Pertinent Medications Reviewed: reviewed, pertinent  Pertinent Medications Comments: Prednisone    Physical Findings    Overall Physical Appearance: weak, lethargic  Oral/Mouth Cavity: WDL  Skin: intact    Anthropometrics    Temp: 97.7 °F (36.5 °C)     Height: 5' 5" (165.1 cm)  Weight Method: Bed Scale  Weight: 62.3 kg (137 lb 5.6 oz)     Ideal Body Weight (IBW), Female: 125 lb     % Ideal Body Weight, Female (lb): 109.88 lb  BMI (Calculated): 22.9  BMI Grade: 18.5-24.9 - normal     Usual Body Weight (UBW), k kg  Weight Change Amount: 22 lb 0.7 oz    Estimated/Assessed Needs    Weight Used For Calorie Calculations: 62.3 kg (137 lb 5.6 oz)     Energy Calorie Requirements (kcal): 1528 " kcal/d  Energy Need Method: Ceiba-[place]enes (X 1.25)     Weight Used For Protein Calculations: 62.3 kg (137 lb 5.6 oz)  Protein Requirements: 62 g/d (1 g/kg)    Fluid Requirements (mL): per MD  Fluid Need Method: RDA Method    Assessment and Plan    Severe protein-calorie malnutrition     Related to (etiology):   Altered nutrient utiliztion 2/2 EtOH abuse     Signs and Symptoms (as evidenced by):   EtOH Cirrhosis, Hepatitis Elevated labs(BUN, Alk Phos, T.hayley. AST, ALT)     Nutrition Diagnosis Status:   Continues     Monitor and Evaluation    Food and Nutrient Intake: energy intake, food and beverage intake  Food and Nutrient Adminstration: diet order     Physical Activity and Function: nutrition-related ADLs and IADLs  Anthropometric Measurements: weight, weight change  Biochemical Data, Medical Tests and Procedures: lipid profile, inflammatory profile, glucose/endocrine profile, gastrointestinal profile, electrolyte and renal panel  Nutrition-Focused Physical Findings: overall appearance    Nutrition Risk    Level of Risk:  (f/u 1x wk)    Nutrition Follow-Up    RD Follow-up?: Yes

## 2017-12-20 NOTE — PLAN OF CARE
Problem: Patient Care Overview  Goal: Plan of Care Review  Outcome: Ongoing (interventions implemented as appropriate)  Pt is AOx4. AVSS overnight. Incontinence care provided throughout shift. All meds administered as scheduled. No c/o pain/disomfort overnight. No acute events/changes occurred. WCTM.

## 2017-12-20 NOTE — TELEPHONE ENCOUNTER
Patient's case was discussed today in liver selection committee. Mammogram has been re-reviewed and is considered low-risk. Will not proceed with breast biopsy at this time. She is still approved pending financial approval.

## 2017-12-20 NOTE — SUBJECTIVE & OBJECTIVE
Interval History: patient seen and examined today; AAO times 4; having multiple BMs; complaining of abdominal distension; IR paracentesis done with 4.6 L removed and negative for SBP  - after being discussed in the liver meeting today, they want to proceed with breast biopsy; will plan for Thursday;     Review of Systems   Constitutional: Negative for chills and fever.   HENT: Negative for rhinorrhea and sore throat.    Eyes: Negative for pain and discharge.   Respiratory: Negative for cough and shortness of breath.    Cardiovascular: Negative for chest pain and leg swelling.   Gastrointestinal: Positive for abdominal distention. Negative for abdominal pain, blood in stool, diarrhea, nausea and vomiting.   Endocrine: Negative for cold intolerance and heat intolerance.   Genitourinary: Negative for dysuria and flank pain.   Neurological: Negative for dizziness and numbness.   Psychiatric/Behavioral: Negative for behavioral problems and confusion.     Objective:     Vital Signs (Most Recent):  Temp: 98.4 °F (36.9 °C) (12/19/17 1132)  Pulse: 100 (12/19/17 1439)  Resp: 18 (12/19/17 1439)  BP: 110/62 (12/19/17 1439)  SpO2: 100 % (12/19/17 1439) Vital Signs (24h Range):  Temp:  [98.4 °F (36.9 °C)-99.7 °F (37.6 °C)] 98.4 °F (36.9 °C)  Pulse:  [100-107] 100  Resp:  [16-20] 18  SpO2:  [98 %-100 %] 100 %  BP: (110-136)/(57-79) 110/62     Weight: 62.3 kg (137 lb 5.6 oz)  Body mass index is 22.86 kg/m².    Intake/Output Summary (Last 24 hours) at 12/19/17 1907  Last data filed at 12/19/17 1437   Gross per 24 hour   Intake           337.08 ml   Output             4600 ml   Net         -4262.92 ml      Physical Exam   Constitutional: She is oriented to person, place, and time. She appears well-developed and well-nourished.   HENT:   Head: Normocephalic and atraumatic.   Eyes: Conjunctivae are normal. Pupils are equal, round, and reactive to light. Scleral icterus is present.   Neck: Normal range of motion. No thyromegaly present.    Cardiovascular: Normal rate, regular rhythm and normal heart sounds.    Pulmonary/Chest: Effort normal and breath sounds normal.   Abdominal: Soft. She exhibits distension. There is no tenderness.   Musculoskeletal: Normal range of motion. She exhibits no edema.   Neurological: She is alert and oriented to person, place, and time.   Skin: No erythema. No pallor.       MELD-Na score: 32 at 12/19/2017  3:51 PM  MELD score: 28 at 12/19/2017  3:51 PM  Calculated from:  Serum Creatinine: 0.8 mg/dL (Rounded to 1) at 12/19/2017  3:51 PM  Serum Sodium: 126 mmol/L at 12/19/2017  3:51 PM  Total Bilirubin: 28.0 mg/dL at 12/19/2017  3:51 PM  INR(ratio): 2.3 at 12/19/2017  4:36 AM  Age: 52 years    Significant Labs:  CBC:    Recent Labs  Lab 12/18/17 0418 12/19/17  0436   WBC 11.02 9.62   HGB 8.8* 8.8*   HCT 25.1* 25.1*   PLT 93* 100*     CMP:    Recent Labs  Lab 12/18/17  1541 12/19/17  0436 12/19/17  1551   * 126* 126*   K 4.1 3.6 3.8   CL 94* 95 96   CO2 24 21* 21*   GLU 88 87 93   BUN 11 10 10   CREATININE 0.9 0.9 0.8   CALCIUM 9.2 8.8 9.0   PROT 6.3 6.1 6.3   ALBUMIN 3.0* 2.9* 2.8*   BILITOT 28.1* 26.8* 28.0*   ALKPHOS 217* 212* 231*   AST 70* 65* 69*   ALT 47* 45* 44   ANIONGAP 8 10 9   EGFRNONAA >60.0 >60.0 >60.0     PTINR:    Recent Labs  Lab 12/18/17  0418 12/18/17  1541 12/19/17  0436   INR 2.7* 2.3* 2.3*       Significant Procedures:   Dobutamine Stress Test with Color Flow:   Results for orders placed or performed during the hospital encounter of 10/19/17   Dobutamine Stress Test w/ Color Flow   Result Value Ref Range    EF 73 55 - 65    Diastolic Dysfunction No     Est. PA Systolic Pressure 21.32     Tricuspid Valve Regurgitation TRIVIAL

## 2017-12-21 LAB
ALBUMIN SERPL BCP-MCNC: 2.5 G/DL
ALBUMIN SERPL BCP-MCNC: 2.8 G/DL
ALP SERPL-CCNC: 236 U/L
ALP SERPL-CCNC: 248 U/L
ALT SERPL W/O P-5'-P-CCNC: 40 U/L
ALT SERPL W/O P-5'-P-CCNC: 47 U/L
ANION GAP SERPL CALC-SCNC: 12 MMOL/L
ANION GAP SERPL CALC-SCNC: 9 MMOL/L
AST SERPL-CCNC: 62 U/L
AST SERPL-CCNC: 66 U/L
BASOPHILS # BLD AUTO: 0.04 K/UL
BASOPHILS NFR BLD: 0.4 %
BILIRUB SERPL-MCNC: 24.9 MG/DL
BILIRUB SERPL-MCNC: 25 MG/DL
BUN SERPL-MCNC: 11 MG/DL
BUN SERPL-MCNC: 12 MG/DL
CALCIUM SERPL-MCNC: 9 MG/DL
CALCIUM SERPL-MCNC: 9.1 MG/DL
CHLORIDE SERPL-SCNC: 96 MMOL/L
CHLORIDE SERPL-SCNC: 96 MMOL/L
CO2 SERPL-SCNC: 16 MMOL/L
CO2 SERPL-SCNC: 21 MMOL/L
CREAT SERPL-MCNC: 0.8 MG/DL
CREAT SERPL-MCNC: 1.1 MG/DL
DIFFERENTIAL METHOD: ABNORMAL
EOSINOPHIL # BLD AUTO: 0 K/UL
EOSINOPHIL NFR BLD: 0.3 %
ERYTHROCYTE [DISTWIDTH] IN BLOOD BY AUTOMATED COUNT: 17.2 %
EST. GFR  (AFRICAN AMERICAN): >60 ML/MIN/1.73 M^2
EST. GFR  (AFRICAN AMERICAN): >60 ML/MIN/1.73 M^2
EST. GFR  (NON AFRICAN AMERICAN): 57.1 ML/MIN/1.73 M^2
EST. GFR  (NON AFRICAN AMERICAN): >60 ML/MIN/1.73 M^2
GLUCOSE SERPL-MCNC: 106 MG/DL
GLUCOSE SERPL-MCNC: 134 MG/DL
GRAM STN SPEC: NORMAL
GRAM STN SPEC: NORMAL
HCT VFR BLD AUTO: 26 %
HGB BLD-MCNC: 9.3 G/DL
IMM GRANULOCYTES # BLD AUTO: 0.06 K/UL
IMM GRANULOCYTES NFR BLD AUTO: 0.6 %
INR PPP: 2.8
LYMPHOCYTES # BLD AUTO: 2.8 K/UL
LYMPHOCYTES NFR BLD: 25.9 %
MAGNESIUM SERPL-MCNC: 1.5 MG/DL
MCH RBC QN AUTO: 31.4 PG
MCHC RBC AUTO-ENTMCNC: 35.8 G/DL
MCV RBC AUTO: 88 FL
MONOCYTES # BLD AUTO: 1.4 K/UL
MONOCYTES NFR BLD: 13 %
NEUTROPHILS # BLD AUTO: 6.4 K/UL
NEUTROPHILS NFR BLD: 59.8 %
NRBC BLD-RTO: 0 /100 WBC
PHOSPHATE SERPL-MCNC: 3.2 MG/DL
PLATELET # BLD AUTO: 126 K/UL
PMV BLD AUTO: 10.3 FL
POTASSIUM SERPL-SCNC: 3.3 MMOL/L
POTASSIUM SERPL-SCNC: 3.9 MMOL/L
PROT SERPL-MCNC: 6 G/DL
PROT SERPL-MCNC: 6 G/DL
PROTHROMBIN TIME: 27.7 SEC
RBC # BLD AUTO: 2.96 M/UL
SODIUM SERPL-SCNC: 124 MMOL/L
SODIUM SERPL-SCNC: 126 MMOL/L
WBC # BLD AUTO: 10.61 K/UL

## 2017-12-21 PROCEDURE — 99233 SBSQ HOSP IP/OBS HIGH 50: CPT | Mod: NTX,,, | Performed by: HOSPITALIST

## 2017-12-21 PROCEDURE — 20600001 HC STEP DOWN PRIVATE ROOM: Mod: NTX

## 2017-12-21 PROCEDURE — 87040 BLOOD CULTURE FOR BACTERIA: CPT | Mod: NTX

## 2017-12-21 PROCEDURE — 36415 COLL VENOUS BLD VENIPUNCTURE: CPT

## 2017-12-21 PROCEDURE — 87205 SMEAR GRAM STAIN: CPT | Mod: NTX

## 2017-12-21 PROCEDURE — 80053 COMPREHEN METABOLIC PANEL: CPT | Mod: 91,NTX

## 2017-12-21 PROCEDURE — 25000003 PHARM REV CODE 250: Mod: NTX | Performed by: SURGERY

## 2017-12-21 PROCEDURE — 83735 ASSAY OF MAGNESIUM: CPT

## 2017-12-21 PROCEDURE — 25000003 PHARM REV CODE 250: Performed by: STUDENT IN AN ORGANIZED HEALTH CARE EDUCATION/TRAINING PROGRAM

## 2017-12-21 PROCEDURE — P9047 ALBUMIN (HUMAN), 25%, 50ML: HCPCS | Performed by: HOSPITALIST

## 2017-12-21 PROCEDURE — 25000003 PHARM REV CODE 250: Performed by: HOSPITALIST

## 2017-12-21 PROCEDURE — 63600175 PHARM REV CODE 636 W HCPCS: Mod: NTX | Performed by: SURGERY

## 2017-12-21 PROCEDURE — 84100 ASSAY OF PHOSPHORUS: CPT

## 2017-12-21 PROCEDURE — 85610 PROTHROMBIN TIME: CPT

## 2017-12-21 PROCEDURE — 63600175 PHARM REV CODE 636 W HCPCS: Performed by: HOSPITALIST

## 2017-12-21 PROCEDURE — 85025 COMPLETE CBC W/AUTO DIFF WBC: CPT

## 2017-12-21 RX ORDER — CEFTRIAXONE 1 G/1
1 INJECTION, POWDER, FOR SOLUTION INTRAMUSCULAR; INTRAVENOUS
Status: DISCONTINUED | OUTPATIENT
Start: 2017-12-21 | End: 2017-12-21 | Stop reason: ALTCHOICE

## 2017-12-21 RX ORDER — ALBUMIN HUMAN 250 G/1000ML
25 SOLUTION INTRAVENOUS 2 TIMES DAILY
Status: DISCONTINUED | OUTPATIENT
Start: 2017-12-21 | End: 2017-12-21

## 2017-12-21 RX ADMIN — MIDODRINE HYDROCHLORIDE 2.5 MG: 2.5 TABLET ORAL at 02:12

## 2017-12-21 RX ADMIN — PANTOPRAZOLE SODIUM 40 MG: 40 TABLET, DELAYED RELEASE ORAL at 09:12

## 2017-12-21 RX ADMIN — POTASSIUM CHLORIDE 20 MEQ: 1500 TABLET, EXTENDED RELEASE ORAL at 09:12

## 2017-12-21 RX ADMIN — Medication 220 MG: at 09:12

## 2017-12-21 RX ADMIN — LACTULOSE 30 G: 20 SOLUTION ORAL at 05:12

## 2017-12-21 RX ADMIN — HYDROCORTISONE ACETATE AND PRAMOXINE HYDROCHLORIDE: 10; 10 CREAM TOPICAL at 09:12

## 2017-12-21 RX ADMIN — MIDODRINE HYDROCHLORIDE 2.5 MG: 2.5 TABLET ORAL at 05:12

## 2017-12-21 RX ADMIN — ALBUMIN (HUMAN) 25 G: 12.5 SOLUTION INTRAVENOUS at 09:12

## 2017-12-21 RX ADMIN — CEFTRIAXONE SODIUM: 1 INJECTION, POWDER, FOR SOLUTION INTRAMUSCULAR; INTRAVENOUS at 09:12

## 2017-12-21 RX ADMIN — RIFAXIMIN 550 MG: 550 TABLET ORAL at 09:12

## 2017-12-21 RX ADMIN — POTASSIUM BICARBONATE 50 MEQ: 25 TABLET, EFFERVESCENT ORAL at 09:12

## 2017-12-21 NOTE — SUBJECTIVE & OBJECTIVE
Interval History: no biopsy necessary for patient, awaiting listing, likely tomorrow;     Review of Systems   Constitutional: Negative for chills and fever.   HENT: Negative for rhinorrhea and sore throat.    Eyes: Negative for pain and discharge.   Respiratory: Negative for cough and shortness of breath.    Cardiovascular: Negative for chest pain and leg swelling.   Gastrointestinal: Positive for abdominal distention. Negative for abdominal pain, blood in stool, diarrhea, nausea and vomiting.   Endocrine: Negative for cold intolerance and heat intolerance.   Genitourinary: Negative for dysuria and flank pain.   Neurological: Negative for dizziness and numbness.   Psychiatric/Behavioral: Negative for behavioral problems and confusion.     Objective:     Vital Signs (Most Recent):  Temp: 97.3 °F (36.3 °C) (12/20/17 1537)  Pulse: 98 (12/20/17 1537)  Resp: 18 (12/20/17 1537)  BP: (!) 106/54 (12/20/17 1537)  SpO2: 99 % (12/20/17 1537) Vital Signs (24h Range):  Temp:  [97.3 °F (36.3 °C)-98.5 °F (36.9 °C)] 97.3 °F (36.3 °C)  Pulse:  [] 98  Resp:  [14-18] 18  SpO2:  [98 %-100 %] 99 %  BP: (100-124)/(51-68) 106/54     Weight: 62.3 kg (137 lb 5.6 oz)  Body mass index is 22.86 kg/m².    Intake/Output Summary (Last 24 hours) at 12/20/17 2017  Last data filed at 12/20/17 1600   Gross per 24 hour   Intake              590 ml   Output                0 ml   Net              590 ml      Physical Exam   Constitutional: She is oriented to person, place, and time. She appears well-developed and well-nourished.   HENT:   Head: Normocephalic and atraumatic.   Eyes: Conjunctivae are normal. Pupils are equal, round, and reactive to light. Scleral icterus is present.   Neck: Normal range of motion. No thyromegaly present.   Cardiovascular: Normal rate, regular rhythm and normal heart sounds.    Pulmonary/Chest: Effort normal and breath sounds normal.   Abdominal: Soft. She exhibits distension. There is no tenderness.    Musculoskeletal: Normal range of motion. She exhibits no edema.   Neurological: She is alert and oriented to person, place, and time.   Skin: No erythema. No pallor.       MELD-Na score: 34 at 12/20/2017  3:52 PM  MELD score: 30 at 12/20/2017  3:52 PM  Calculated from:  Serum Creatinine: 0.9 mg/dL (Rounded to 1) at 12/20/2017  3:52 PM  Serum Sodium: 126 mmol/L at 12/20/2017  3:52 PM  Total Bilirubin: 26.9 mg/dL at 12/20/2017  3:52 PM  INR(ratio): 2.8 at 12/20/2017  5:52 AM  Age: 52 years    Significant Labs:  CBC:    Recent Labs  Lab 12/19/17  0436 12/20/17  0552   WBC 9.62 11.20   HGB 8.8* 9.2*   HCT 25.1* 25.9*   * 108*     CMP:    Recent Labs  Lab 12/19/17  1551 12/20/17  0552 12/20/17  1552   * 125* 126*   K 3.8 3.4* 3.4*   CL 96 96 96   CO2 21* 20* 21*   GLU 93 103 106   BUN 10 9 9   CREATININE 0.8 0.7 0.9   CALCIUM 9.0 8.8 8.9   PROT 6.3 6.1 6.2   ALBUMIN 2.8* 2.7* 2.7*   BILITOT 28.0* 25.8* 26.9*   ALKPHOS 231* 220* 247*   AST 69* 58* 62*   ALT 44 42 42   ANIONGAP 9 9 9   EGFRNONAA >60.0 >60.0 >60.0     PTINR:    Recent Labs  Lab 12/19/17  0436 12/20/17  0552   INR 2.3* 2.8*       Significant Procedures:   Dobutamine Stress Test with Color Flow:   Results for orders placed or performed during the hospital encounter of 10/19/17   Dobutamine Stress Test w/ Color Flow   Result Value Ref Range    EF 73 55 - 65    Diastolic Dysfunction No     Est. PA Systolic Pressure 21.32     Tricuspid Valve Regurgitation TRIVIAL

## 2017-12-21 NOTE — PROGRESS NOTES
[facility] Hospital Medicine  Progress Note    Patient Name: Marcie Bazan  MRN: [id number]  Patient Class: IP- Inpatient   Admission Date: 11/23/2017  Length of Stay: 27 days  Attending Physician: Burton Vora MD  Primary Care Provider: Milton Ferrer, [facility] Medicine Team: [facility]OSP MED L Burton Vora MD    Subjective:     Principal Problem:Decompensated hepatic cirrhosis    HPI:  52 year old female with PMHx of 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 Patient presented to [facility] with generalized weakness. Onset two months ago, progressively worsening. Was admitted to  11/10 for hyponatremia seen on labs in GI office. Discharged on 11/15 and returned on 11/20 with 1 week worsening weakness, malaise, lethargy, and decreased appetite. Denied fever/chills, abdominal pain, bright red or black tarry stools, hematemesis or other abnormal bleeding, confusion or disorientation, changes in urination, light-headedness, headache, changes in vision, dyspnea, chest pain or palpitations. Labs demonstrated Na 116 and MELD 32; patient admitted for hyponatremia. Nephrology consulted, recommended fluid restriction , holding diuretics, and prednisode taper that started 11/23 (decreased from 40mg QD to 20mg Qd.) Patient transferred to [facility] for revaluation of liver disease. Of note, paracentesis performed 11/22, removed 2L, negative for SBP.    Follows with Dr. Souza for cirrhosis. Since early 09/2017, patient's liver failure progressing indicated by worsening clinical signs (abdominal distention, weakness/fatigue) and MELD. In mid-October, patient's PETH demonstrated EtOH use and she was denied candidacy for liver transplant. She was then enrolled in in-patient substance abuse program at [facility] where she has remained since early  November; per patient, her last EtOH beverage was 11/01/2017. Denies history of GI bleed, SBP, renal disease, or other complications of cirrhosis. Liver biopsy 10/24 demonstrated stage 4 cirrhosis. Records reports EGD in August 2016 that showed small esophageal varices with severe gastritis. Colonoscopy 2015 with 2 polyps and internal hemorrhoids Started on prednisone 40mg QD on 10/25; was on spironolactone, has been held since 11/10 for hypoNa; on lactulose.    Hospital Course:  Mental status improving with hepatic encephalopathy treatment. Started empiric ceftriaxone given leukocytosis in immunocompromised state. CMV PCR positive overnight, for which ID was consulted, recommended repeating titers in 1 week. Seen by Psychiatry and  who determined she was moderate risk for Liver transplant. Will proceed with EGD/Cscope and mammogram after FFP. Colonoscopy not performed due to poor prep, EGD revealed large varices which were banded. IR paracentesis ordered after patient started complaining of abdominal pain. Mammogram performed, IR paracentesis after INR Reversal negative for SBP. Patient accepted for Liver transplant pending review of previous mammograms.  Patient was slightly confused and altered, which resolved with lactulose enemas. She will need a core Breast Biopsy prior to listing in order to rule out malignancy.       Interval History: no biopsy necessary for patient, awaiting listing, likely tomorrow;     Review of Systems   Constitutional: Negative for chills and fever.   HENT: Negative for rhinorrhea and sore throat.    Eyes: Negative for pain and discharge.   Respiratory: Negative for cough and shortness of breath.    Cardiovascular: Negative for chest pain and leg swelling.   Gastrointestinal: Positive for abdominal distention. Negative for abdominal pain, blood in stool, diarrhea, nausea and vomiting.   Endocrine: Negative for cold intolerance and heat intolerance.   Genitourinary: Negative  for dysuria and flank pain.   Neurological: Negative for dizziness and numbness.   Psychiatric/Behavioral: Negative for behavioral problems and confusion.     Objective:     Vital Signs (Most Recent):  Temp: 97.3 °F (36.3 °C) (12/20/17 1537)  Pulse: 98 (12/20/17 1537)  Resp: 18 (12/20/17 1537)  BP: (!) 106/54 (12/20/17 1537)  SpO2: 99 % (12/20/17 1537) Vital Signs (24h Range):  Temp:  [97.3 °F (36.3 °C)-98.5 °F (36.9 °C)] 97.3 °F (36.3 °C)  Pulse:  [] 98  Resp:  [14-18] 18  SpO2:  [98 %-100 %] 99 %  BP: (100-124)/(51-68) 106/54     Weight: 62.3 kg (137 lb 5.6 oz)  Body mass index is 22.86 kg/m².    Intake/Output Summary (Last 24 hours) at 12/20/17 2017  Last data filed at 12/20/17 1600   Gross per 24 hour   Intake              590 ml   Output                0 ml   Net              590 ml      Physical Exam   Constitutional: She is oriented to person, place, and time. She appears well-developed and well-nourished.   HENT:   Head: Normocephalic and atraumatic.   Eyes: Conjunctivae are normal. Pupils are equal, round, and reactive to light. Scleral icterus is present.   Neck: Normal range of motion. No thyromegaly present.   Cardiovascular: Normal rate, regular rhythm and normal heart sounds.    Pulmonary/Chest: Effort normal and breath sounds normal.   Abdominal: Soft. She exhibits distension. There is no tenderness.   Musculoskeletal: Normal range of motion. She exhibits no edema.   Neurological: She is alert and oriented to person, place, and time.   Skin: No erythema. No pallor.       MELD-Na score: 34 at 12/20/2017  3:52 PM  MELD score: 30 at 12/20/2017  3:52 PM  Calculated from:  Serum Creatinine: 0.9 mg/dL (Rounded to 1) at 12/20/2017  3:52 PM  Serum Sodium: 126 mmol/L at 12/20/2017  3:52 PM  Total Bilirubin: 26.9 mg/dL at 12/20/2017  3:52 PM  INR(ratio): 2.8 at 12/20/2017  5:52 AM  Age: 52 years    Significant Labs:  CBC:    Recent Labs  Lab 12/19/17  0436 12/20/17  0552   WBC 9.62 11.20   HGB 8.8* 9.2*    HCT 25.1* 25.9*   * 108*     CMP:    Recent Labs  Lab 12/19/17  1551 12/20/17  0552 12/20/17  1552   * 125* 126*   K 3.8 3.4* 3.4*   CL 96 96 96   CO2 21* 20* 21*   GLU 93 103 106   BUN 10 9 9   CREATININE 0.8 0.7 0.9   CALCIUM 9.0 8.8 8.9   PROT 6.3 6.1 6.2   ALBUMIN 2.8* 2.7* 2.7*   BILITOT 28.0* 25.8* 26.9*   ALKPHOS 231* 220* 247*   AST 69* 58* 62*   ALT 44 42 42   ANIONGAP 9 9 9   EGFRNONAA >60.0 >60.0 >60.0     PTINR:    Recent Labs  Lab 12/19/17  0436 12/20/17  0552   INR 2.3* 2.8*       Significant Procedures:   Dobutamine Stress Test with Color Flow:   Results for orders placed or performed during the hospital encounter of 10/19/17   Dobutamine Stress Test w/ Color Flow   Result Value Ref Range    EF 73 55 - 65    Diastolic Dysfunction No     Est. PA Systolic Pressure 21.32     Tricuspid Valve Regurgitation TRIVIAL             Assessment/Plan:      * Decompensated hepatic cirrhosis    MELD-Na score: 34 at 12/20/2017  3:52 PM  MELD score: 30 at 12/20/2017  3:52 PM  Calculated from:  Serum Creatinine: 0.9 mg/dL (Rounded to 1) at 12/20/2017  3:52 PM  Serum Sodium: 126 mmol/L at 12/20/2017  3:52 PM  Total Bilirubin: 26.9 mg/dL at 12/20/2017  3:52 PM  INR(ratio): 2.8 at 12/20/2017  5:52 AM  Age: 52 years   - had half of her evaluation completed on last admission  - AS per Psychiatry patient is moderate risk, will proceed with Liver transplant evaluation  - EGD performed, esophageal varices banded,   - Mammogram performed on 12/4/17   - C-scope repeated on 12/5- good prep  - accepted for liver transplant pending listing, likely tomorrow   - IR paracentesis on 12/19- 4.6 L removed and negative for SBP        Transplant donor evaluation    Presented at Liver selection committee on 12/06/17, approved pending Breast core biopsy to rule out malignancy          Immunosuppressed status    On chronic prednisone for alcoholic hepatitis; Prednisone has been weaned off, last dose of 5mg of prednisone was on  12/15          Coagulopathy    INR 2.7 after 2 units of FFPs          Alcoholic cirrhosis of liver with ascites    - see decompensated cirrhosis   - IR paracentesis on 12/12 removed 5 liters         Cytomegalovirus (CMV) viremia    -- ID evaluated and do not feel it needs to be treated        Thrombocytopenia    -- monitor           Abnormal liver enzymes    -- Stable  -- Etiology liver failure; see that section for more details          Alcoholic hepatitis with ascites- decompensated with elevated MELD    - prednisone has been weaned off;         Hyponatremia    - on 500 cc fluid restriction; on albumin  - 116-->118-->120-->122-->123-->126-->125-->127-->131-->133-->136-->135-->132-->129-->127-->126        End stage liver disease              HCC (hepatocellular carcinoma)    AFP 9; seen on CT on 10/19, 1 cm focus, monitor for now          Hepatic encephalopathy    -- doing well; cont to have 3 to 4 BMs daily; cont lactulose and rifaximin and zinc        Severe protein-calorie malnutrition    PAB, boost and dietary consult          Ascites    -- Holding diuretics at present, s/p 2L removed on 11/22 with testing negative for SBP  -- No abdominal pain today    - IR paracentesis on 12/12/17 removed 5.2 liters               Essential hypertension    - Stable  - Holding home lisinopril in setting of decompensated liver disease          Anemia of chronic disease    - monitor daily            VTE Risk Mitigation         Ordered     Place sequential compression device  Until discontinued      11/24/17 0734     Place FABIOLA hose  Until discontinued      11/23/17 2305     Medium Risk of VTE  Once      11/23/17 2305              Burton Vora MD  Department of Hospital Medicine   [facility]

## 2017-12-21 NOTE — PLAN OF CARE
Problem: Patient Care Overview  Goal: Plan of Care Review  Outcome: Ongoing (interventions implemented as appropriate)  Patient slept for most of the day. No appetite with minimal intake, following fluid restriction with no complaints. No new complaints of issues. Vitals WNL. Fall and skin breakdown precautions in place.

## 2017-12-21 NOTE — ASSESSMENT & PLAN NOTE
MELD-Na score: 34 at 12/20/2017  3:52 PM  MELD score: 30 at 12/20/2017  3:52 PM  Calculated from:  Serum Creatinine: 0.9 mg/dL (Rounded to 1) at 12/20/2017  3:52 PM  Serum Sodium: 126 mmol/L at 12/20/2017  3:52 PM  Total Bilirubin: 26.9 mg/dL at 12/20/2017  3:52 PM  INR(ratio): 2.8 at 12/20/2017  5:52 AM  Age: 52 years   - had half of her evaluation completed on last admission  - AS per Psychiatry patient is moderate risk, will proceed with Liver transplant evaluation  - EGD performed, esophageal varices banded,   - Mammogram performed on 12/4/17   - C-scope repeated on 12/5- good prep  - accepted for liver transplant pending listing, likely tomorrow   - IR paracentesis on 12/19- 4.6 L removed and negative for SBP

## 2017-12-21 NOTE — PLAN OF CARE
Problem: Fall Risk (Adult)  Intervention: Patient Rounds  Rounds every 2 hours. Bed alarm on. Safety precautions in place. Instructed to use call light when needed      Problem: Pressure Ulcer Risk (Clinton Scale) (Adult,Obstetrics,Pediatric)  Goal: Skin Integrity  Patient will demonstrate the desired outcomes by discharge/transition of care.   Hemmorids. Cream at bedside. Turn q 2 and as needed. Will continue to monitor    Comments: Understands care plan. Denies any further questions. Will continue to monitor

## 2017-12-22 LAB
ALBUMIN SERPL BCP-MCNC: 2.5 G/DL
ALBUMIN SERPL BCP-MCNC: 2.8 G/DL
ALP SERPL-CCNC: 250 U/L
ALP SERPL-CCNC: 251 U/L
ALT SERPL W/O P-5'-P-CCNC: 42 U/L
ALT SERPL W/O P-5'-P-CCNC: 42 U/L
ANION GAP SERPL CALC-SCNC: 10 MMOL/L
ANION GAP SERPL CALC-SCNC: 8 MMOL/L
AST SERPL-CCNC: 71 U/L
AST SERPL-CCNC: 76 U/L
BACTERIA #/AREA URNS AUTO: ABNORMAL /HPF
BASOPHILS # BLD AUTO: 0.08 K/UL
BASOPHILS NFR BLD: 0.8 %
BILIRUB SERPL-MCNC: 23.7 MG/DL
BILIRUB SERPL-MCNC: 24.6 MG/DL
BILIRUB UR QL STRIP: ABNORMAL
BUN SERPL-MCNC: 13 MG/DL
BUN SERPL-MCNC: 14 MG/DL
CALCIUM SERPL-MCNC: 8.7 MG/DL
CALCIUM SERPL-MCNC: 8.9 MG/DL
CHLORIDE SERPL-SCNC: 97 MMOL/L
CHLORIDE SERPL-SCNC: 98 MMOL/L
CLARITY UR REFRACT.AUTO: ABNORMAL
CO2 SERPL-SCNC: 19 MMOL/L
CO2 SERPL-SCNC: 22 MMOL/L
COLOR UR AUTO: ABNORMAL
CREAT SERPL-MCNC: 0.9 MG/DL
CREAT SERPL-MCNC: 0.9 MG/DL
DIFFERENTIAL METHOD: ABNORMAL
EOSINOPHIL # BLD AUTO: 0 K/UL
EOSINOPHIL NFR BLD: 0.4 %
ERYTHROCYTE [DISTWIDTH] IN BLOOD BY AUTOMATED COUNT: 17.2 %
EST. GFR  (AFRICAN AMERICAN): >60 ML/MIN/1.73 M^2
EST. GFR  (AFRICAN AMERICAN): >60 ML/MIN/1.73 M^2
EST. GFR  (NON AFRICAN AMERICAN): >60 ML/MIN/1.73 M^2
EST. GFR  (NON AFRICAN AMERICAN): >60 ML/MIN/1.73 M^2
GLUCOSE SERPL-MCNC: 127 MG/DL
GLUCOSE SERPL-MCNC: 84 MG/DL
GLUCOSE UR QL STRIP: ABNORMAL
HCT VFR BLD AUTO: 27.5 %
HGB BLD-MCNC: 9.4 G/DL
HGB UR QL STRIP: ABNORMAL
IMM GRANULOCYTES # BLD AUTO: 0.04 K/UL
IMM GRANULOCYTES NFR BLD AUTO: 0.4 %
INR PPP: 2.9
KETONES UR QL STRIP: NEGATIVE
LEUKOCYTE ESTERASE UR QL STRIP: NEGATIVE
LYMPHOCYTES # BLD AUTO: 2.6 K/UL
LYMPHOCYTES NFR BLD: 27.5 %
MAGNESIUM SERPL-MCNC: 1.3 MG/DL
MCH RBC QN AUTO: 31.3 PG
MCHC RBC AUTO-ENTMCNC: 34.2 G/DL
MCV RBC AUTO: 92 FL
MICROSCOPIC COMMENT: ABNORMAL
MONOCYTES # BLD AUTO: 1.3 K/UL
MONOCYTES NFR BLD: 13.3 %
NEUTROPHILS # BLD AUTO: 5.5 K/UL
NEUTROPHILS NFR BLD: 57.6 %
NITRITE UR QL STRIP: NEGATIVE
NRBC BLD-RTO: 0 /100 WBC
PH UR STRIP: 5 [PH] (ref 5–8)
PHOSPHATE SERPL-MCNC: 2.7 MG/DL
PLATELET # BLD AUTO: 108 K/UL
PMV BLD AUTO: 10.4 FL
POTASSIUM SERPL-SCNC: 3.7 MMOL/L
POTASSIUM SERPL-SCNC: 4.4 MMOL/L
PROT SERPL-MCNC: 5.8 G/DL
PROT SERPL-MCNC: 6 G/DL
PROT UR QL STRIP: NEGATIVE
PROTHROMBIN TIME: 29 SEC
RBC # BLD AUTO: 3 M/UL
RBC #/AREA URNS AUTO: 4 /HPF (ref 0–4)
SODIUM SERPL-SCNC: 127 MMOL/L
SODIUM SERPL-SCNC: 127 MMOL/L
SP GR UR STRIP: 1.02 (ref 1–1.03)
SQUAMOUS #/AREA URNS AUTO: 19 /HPF
URN SPEC COLLECT METH UR: ABNORMAL
UROBILINOGEN UR STRIP-ACNC: 4 EU/DL
WBC # BLD AUTO: 9.52 K/UL
WBC #/AREA URNS AUTO: 6 /HPF (ref 0–5)
YEAST UR QL AUTO: ABNORMAL

## 2017-12-22 PROCEDURE — 87086 URINE CULTURE/COLONY COUNT: CPT | Mod: NTX

## 2017-12-22 PROCEDURE — 81001 URINALYSIS AUTO W/SCOPE: CPT | Mod: NTX

## 2017-12-22 PROCEDURE — 63600175 PHARM REV CODE 636 W HCPCS: Mod: NTX | Performed by: NURSE PRACTITIONER

## 2017-12-22 PROCEDURE — 63600175 PHARM REV CODE 636 W HCPCS: Mod: NTX | Performed by: HOSPITALIST

## 2017-12-22 PROCEDURE — 63600175 PHARM REV CODE 636 W HCPCS: Mod: NTX | Performed by: SURGERY

## 2017-12-22 PROCEDURE — 25000003 PHARM REV CODE 250: Mod: NTX | Performed by: STUDENT IN AN ORGANIZED HEALTH CARE EDUCATION/TRAINING PROGRAM

## 2017-12-22 PROCEDURE — 80053 COMPREHEN METABOLIC PANEL: CPT | Mod: NTX

## 2017-12-22 PROCEDURE — 99233 SBSQ HOSP IP/OBS HIGH 50: CPT | Mod: NTX,,, | Performed by: NURSE PRACTITIONER

## 2017-12-22 PROCEDURE — 85025 COMPLETE CBC W/AUTO DIFF WBC: CPT | Mod: NTX

## 2017-12-22 PROCEDURE — 20600001 HC STEP DOWN PRIVATE ROOM: Mod: NTX

## 2017-12-22 PROCEDURE — 83735 ASSAY OF MAGNESIUM: CPT | Mod: NTX

## 2017-12-22 PROCEDURE — 87088 URINE BACTERIA CULTURE: CPT | Mod: NTX

## 2017-12-22 PROCEDURE — 25000003 PHARM REV CODE 250: Mod: NTX | Performed by: HOSPITALIST

## 2017-12-22 PROCEDURE — 87186 SC STD MICRODIL/AGAR DIL: CPT | Mod: NTX

## 2017-12-22 PROCEDURE — 25000003 PHARM REV CODE 250: Mod: NTX | Performed by: SURGERY

## 2017-12-22 PROCEDURE — 85610 PROTHROMBIN TIME: CPT | Mod: NTX

## 2017-12-22 PROCEDURE — 36415 COLL VENOUS BLD VENIPUNCTURE: CPT | Mod: NTX

## 2017-12-22 PROCEDURE — 84100 ASSAY OF PHOSPHORUS: CPT | Mod: NTX

## 2017-12-22 PROCEDURE — 87077 CULTURE AEROBIC IDENTIFY: CPT | Mod: NTX

## 2017-12-22 RX ORDER — MAGNESIUM SULFATE HEPTAHYDRATE 40 MG/ML
2 INJECTION, SOLUTION INTRAVENOUS
Status: COMPLETED | OUTPATIENT
Start: 2017-12-22 | End: 2017-12-22

## 2017-12-22 RX ORDER — ALBUMIN HUMAN 250 G/1000ML
25 SOLUTION INTRAVENOUS ONCE
Status: CANCELLED | OUTPATIENT
Start: 2017-12-22 | End: 2017-12-22

## 2017-12-22 RX ADMIN — HYDROCORTISONE ACETATE AND PRAMOXINE HYDROCHLORIDE: 10; 10 CREAM TOPICAL at 08:12

## 2017-12-22 RX ADMIN — POTASSIUM CHLORIDE 20 MEQ: 1500 TABLET, EXTENDED RELEASE ORAL at 08:12

## 2017-12-22 RX ADMIN — RIFAXIMIN 550 MG: 550 TABLET ORAL at 08:12

## 2017-12-22 RX ADMIN — ONDANSETRON 4 MG: 2 INJECTION INTRAMUSCULAR; INTRAVENOUS at 01:12

## 2017-12-22 RX ADMIN — MAGNESIUM SULFATE IN WATER 2 G: 40 INJECTION, SOLUTION INTRAVENOUS at 04:12

## 2017-12-22 RX ADMIN — LACTULOSE 30 G: 20 SOLUTION ORAL at 01:12

## 2017-12-22 RX ADMIN — PANTOPRAZOLE SODIUM 40 MG: 40 TABLET, DELAYED RELEASE ORAL at 08:12

## 2017-12-22 RX ADMIN — Medication 220 MG: at 08:12

## 2017-12-22 RX ADMIN — MAGNESIUM SULFATE IN WATER 2 G: 40 INJECTION, SOLUTION INTRAVENOUS at 01:12

## 2017-12-22 RX ADMIN — CEFTRIAXONE SODIUM: 1 INJECTION, POWDER, FOR SOLUTION INTRAMUSCULAR; INTRAVENOUS at 06:12

## 2017-12-22 RX ADMIN — LACTULOSE 30 G: 20 SOLUTION ORAL at 08:12

## 2017-12-22 NOTE — SUBJECTIVE & OBJECTIVE
Scheduled Meds:   [START ON 12/23/2017] custom IVPB builder  1 g Intravenous Q24H    lactulose  30 g Oral TID    magnesium sulfate in water  2 g Intravenous Q2H    pantoprazole  40 mg Oral Daily    pramoxine-hydrocortisone   Rectal BID    rifAXImin  550 mg Oral BID    zinc sulfate  220 mg Oral Daily     Continuous Infusions:  PRN Meds:GI cocktail (mylanta 30 mL, lidocaine 2 % viscous 10 mL, dicyclomine 10 mL) 50 mL, ondansetron, ondansetron, simethicone, sodium chloride 0.9%, sodium chloride 0.9%, sodium chloride 0.9%    Review of Systems   Constitutional: Positive for activity change, appetite change and fatigue. Negative for fever.   HENT: Negative.  Negative for congestion and facial swelling.    Eyes: Negative.    Respiratory: Negative.  Negative for chest tightness, shortness of breath and wheezing.    Cardiovascular: Negative.  Negative for chest pain, palpitations and leg swelling.   Gastrointestinal: Positive for abdominal distention and abdominal pain. Negative for constipation, diarrhea, nausea and vomiting.   Genitourinary: Negative.  Negative for decreased urine volume, difficulty urinating, dysuria, hematuria and urgency.   Musculoskeletal: Negative.  Negative for back pain, neck pain and neck stiffness.   Skin: Negative.  Negative for color change, pallor and rash.   Neurological: Positive for weakness. Negative for dizziness, seizures, speech difficulty, light-headedness and headaches.   Psychiatric/Behavioral: Positive for confusion and sleep disturbance. Negative for behavioral problems, hallucinations and suicidal ideas. The patient is nervous/anxious.      Objective:     Vital Signs (Most Recent):  Temp: 98 °F (36.7 °C) (12/22/17 1230)  Pulse: 100 (12/22/17 1230)  Resp: 18 (12/22/17 1230)  BP: (!) 99/52 (12/22/17 1230)  SpO2: 99 % (12/22/17 1230) Vital Signs (24h Range):  Temp:  [97.5 °F (36.4 °C)-98.4 °F (36.9 °C)] 98 °F (36.7 °C)  Pulse:  [] 100  Resp:  [15-18] 18  SpO2:  [98 %-100  %] 99 %  BP: ()/(51-57) 99/52     Weight: 62.3 kg (137 lb 5.6 oz)  Body mass index is 22.86 kg/m².    Intake/Output - Last 3 Shifts       12/20 0700 - 12/21 0659 12/21 0700 - 12/22 0659 12/22 0700 - 12/23 0659    P.O. 740 450     I.V. (mL/kg)  100 (1.6)     Total Intake(mL/kg) 740 (11.9) 550 (8.8)     Urine (mL/kg/hr)       Other       Stool       Total Output          Net +740 +550             Urine Occurrence 7 x 4 x     Stool Occurrence 7 x 7 x           Physical Exam   Constitutional: She is oriented to person, place, and time. She appears well-developed and well-nourished. No distress.   HENT:   Head: Normocephalic and atraumatic.   Eyes: Scleral icterus is present.   Neck: Normal range of motion. Neck supple.   Cardiovascular: Normal rate, regular rhythm and normal heart sounds.    No murmur heard.  Pulmonary/Chest: Effort normal. No respiratory distress. She has decreased breath sounds in the right lower field and the left lower field. She has no wheezes. She exhibits no tenderness.   Abdominal: Soft. Bowel sounds are normal. She exhibits distension. There is tenderness.   Musculoskeletal: Normal range of motion. She exhibits no edema or tenderness.   Neurological: She is alert and oriented to person, place, and time.   Skin: Skin is warm and dry. She is not diaphoretic.   Psychiatric: She has a normal mood and affect. Her behavior is normal. Judgment and thought content normal.   Nursing note and vitals reviewed.      Laboratory:  Immunosuppressants     None        CBC:   Recent Labs  Lab 12/22/17  0609   WBC 9.52   RBC 3.00*   HGB 9.4*   HCT 27.5*   *   MCV 92   MCH 31.3*   MCHC 34.2     CMP:   Recent Labs  Lab 12/22/17  0608   GLU 84   CALCIUM 8.9   ALBUMIN 2.8*   PROT 6.0   *   K 4.4   CO2 22*   CL 97   BUN 13   CREATININE 0.9   ALKPHOS 250*   ALT 42   AST 71*   BILITOT 24.6*     Coagulation:   Recent Labs  Lab 12/22/17  0609   INR 2.9*     Labs within the past 24 hours have been  reviewed.    Diagnostic Results:  I have personally reviewed all pertinent imaging studies.

## 2017-12-22 NOTE — ASSESSMENT & PLAN NOTE
MELD-Na score: 33 at 12/22/2017  6:09 AM  MELD score: 30 at 12/22/2017  6:09 AM  Calculated from:  Serum Creatinine: 0.9 mg/dL (Rounded to 1) at 12/22/2017  6:08 AM  Serum Sodium: 127 mmol/L at 12/22/2017  6:08 AM  Total Bilirubin: 24.6 mg/dL at 12/22/2017  6:08 AM  INR(ratio): 2.9 at 12/22/2017  6:09 AM  Age: 52 years    Age: 52 years   - had half of her evaluation completed on last admission  - AS per Psychiatry patient is moderate risk.   - EGD performed, esophageal varices banded,   - Mammogram performed on 12/4/17   - C-scope repeated on 12/5- good prep  - accepted for liver transplant and now listed with a MELD of 34.    - IR paracentesis on 12/19- 4.6 L removed and negative for SBP

## 2017-12-22 NOTE — PROGRESS NOTES
Admit Note     Spoke with pt's daughter over the phone to assess patients needs due to pt being disoriented and confused.  Patient is a 52 y.o. single female, admitted for decompensated hepatic cirrhosis. Pt was recently listed for a liver transplant on 12/21/17.     Patient admitted from the ED on 11/23/2017 .  At this time, patient presents as disoriented and confused but pleasant.  At this time, patients caregiver sounds alert and oriented x 4, pleasant, recall good, concentration/judgement good, average intelligence, calm, communicative, cooperative and asking and answering questions appropriately.      Household/Family Systems (as reported by patients caregiver)     Patient resides with patient's daughter and three grandchildren, at [address] [address].  Support system includes pt's sister Yani, sister Patricia, daughter Reginaldo, son Duarte, friend Ross Sin.  Patient does not have dependents that are need of being cared for.     Patients primary caregiver is Patricia Bazan, patients sister, phone number [phone] and Reginaldo Baazn, patient's daughter, phone number [phone].  Confirmed patients contact information is [phone] (home);   Telephone Information:   Mobile [phone]     During admission, patient's caregiver plans to stay at home.  Confirmed patient and patients caregivers do have access to reliable transportation.    Cognitive Status/Learning     Patients caregiver reports patients reading ability as college and states patient does have difficulty with seeing-wears prescription glasses, comprehension, learning and memory due to ammonia levels.  Patients caregiver reports patient learns best by verbal information.   Needed: No.   Highest education level: Attended College/Technical School    Vocation/Disability (as reported by patients caregiver)    Working for Income: No  If no, reason not working: Disability  Patient applied for social  security disability and the claim was denied. Pt appealed and she is awaiting her court date which daughter reports should be some time in February 2018.     Adherence     Patients caregiver reports patient has a high level of adherence to patients health care regimen.  Adherence counseling and education provided.  Patient's caregiver verbalizes understanding.    Substance Use    Patients caregiver reports patients substance usage as the following:    Tobacco: none, patient denies any use.  Alcohol: none, patient denies any use. Pt last drank ETOH October 2017. Pt drank a large cup of vodka and juice that she would refill 1-2x daily. This was pt's pattern of drinking for many years. Pt recently began formal rehab at [facility] located at [facility] [address] (ph#[phone]). Per daughter, pt plans to complete the program once cleared to return home post-transplant.   Illicit Drugs/Non-prescribed Medications: none, patient denies any use.  Patients caregiver states clear understanding of the potential impact of substance use.  Substance abstinence/cessation counseling, education and resources provided and reviewed.     Services Utilizing/ADLS (as reported by patients caregiver)    Infusion Service: Prior to admission, patient utilizing? no  Home Health: Prior to admission, patient utilizing? no  DME: Prior to admission, yes; rolling walker  Pulmonary/Cardiac Rehab: Prior to admission, no  Dialysis:  Prior to admission, no  Current Pharm[facility]  [facility] [place]  [address].    Prior to admission, patients caregiver reports patient was not independent with ADLS and was not driving.  Patients caregiver reports patient is not able to care for self at this time due to compromised medical condition (as documented in medical record) and physical weakness..  Patients caregiver reports patient indicates a willingness  to care for self once medically cleared to do so.    Insurance/Medications    Insured by   Payor/Plan Subscr  Sex Relation Sub. Ins. ID Effective Group Num   1. [employer]* GUADALUPE TOMLIN 1965 Female  [id number] Not Eff                                    [address] [address]   2. MEDICAID - * GUADALUPE TOMLIN 1965 Female  [id number] 10/19/17 [id number]                                   [address] [address]      Primary Insurance (for UNOS reporting): Public Insurance - Medicaid-[employer]  Secondary Insurance (for UNOS reporting): None    Patients caregiver reports patient is able to obtain and afford medications at this time and at time of discharge. Pt's daughter reports that pt does not have any income at this time but does receive food stamps. Daughter unable to state how much she gets at this time. Pt's daughter reports that she currently assists with all of pt's expenses. Daughter denies any issues with managing finances at this time.     Living Will/Healthcare Power of     Patients caregiver reports patient does not have a LW and/or HCPA.   provided education regarding LW and HCPA and the completion of forms.    Coping/Mental Health (as reported by patients caregiver)    Patient is up and down in terms of mood. Pt's daughter reports that last week pt was in a good mood but gets lonely when no one is visiting with her. Pt does not have any formal diagnosis of depression or anxiety. Pt's daughter reports that she is coping adequately well at this time.     Discharge Planning (as reported by patients caregiver)    At time of discharge, patient plans to return to [place] [employer] [address] under the care of pt's sister or daughter post-transplant. If pt is discharged before transplant, pt will return home under the care of pt's daughter.  Patients family will transport patient.  Per rounds today, expected discharge date has not been medically determined at this time. Patients caretaker  verbalizes understanding and is involved in treatment planning and discharge process.    Additional Concerns    Patient's caretaker denies additional needs and/or concerns at this time. Patient is being followed for needs, education, resources, information, emotional support, supportive counseling, and for supportive and skilled discharge plan of care.  providing ongoing psychosocial support, education, resources and d/c planning as needed.  SW remains available. Patient's caregiver verbalizes understanding and agreement with information reviewed,  availability and how to access available resources as needed.

## 2017-12-22 NOTE — HOSPITAL COURSE
Mental status improving with hepatic encephalopathy treatment. Started empiric ceftriaxone given leukocytosis in immunocompromised state. CMV PCR positive, for which ID was consulted, recommended repeating titers in 1 week. Seen by Psychiatry and  who determined she was moderate risk for Liver transplant. EGD/Cscope and mammogram ordered. Colonoscopy not performed due to poor prep, EGD revealed large varices which were banded. IR paracentesis ordered after patient started complaining of abdominal pain. Mammogram performed, IR paracentesis after INR Reversal negative for SBP.  As pt with low risk malignancy, it was decided not to proceed with a breast biopsy.  Patient accepted for Liver transplant. Listed with MELD 35. Patient was slightly confused and altered, which resolved with lactulose enemas.     Liver transplant 12/27/2017 (DBD, EBV D+/R+, CMV D-/R+, donor normal anatomy)     POD# 0 - Slow to arouse from sedation, not following commands, minimal vent setting however unable to extubate due to mental status. Poor UOP.     POD#1 - Extubated, pt more alert and able to follow commands however confused and disoriented. Poor UOP 10-15 cc/hr. LFTs trending down , normalized INR, Liver US within normal limits. Thrombocytopenia, 1 pack plts given with line removal, transducer in RIJ  Developed hematoma, required holding pressure for 1.5hrs and another pack of plts.     POD#2 - Improve mental status AAO to person and place, not situation or time. Continues to have minimal UOP not responsive to lasix, Nephrology consulted, started CRRT. LFTs continue to trend down, INR normalized, plts 136     POD# 3 - UOP remaines 5cc/hr, tolerated CRRT for 10 hours, 2.5L UF. Mental status continues to clear up. AF, HDS, tolerating diet, OOB, pain controlled.    Interval history: no acute events overnight. Pt reports feeling well this am but dose have complaints of increased jitteriness. Recent elevated prograf trough could  be the cause of jitteriness. Able to sleep a little more overnight with trazodone. Pt alert and oriented but continues to have slowed mentation. Hematomas evacuated from [employer] on 1/1/18 and wound vac now placed to [employer]. LFTs stable. Continue treatment with Amoxil for Enterococcus UTI. Plan for 7 day course, end date 1/7/18. Monitor.

## 2017-12-22 NOTE — ASSESSMENT & PLAN NOTE
Previously on chronic prednisone for alcoholic hepatitis; Prednisone has been weaned off, last dose of 5mg of prednisone was on 12/15

## 2017-12-22 NOTE — ASSESSMENT & PLAN NOTE
MELD-Na score: 34 at 12/21/2017  3:53 PM  MELD score: 31 at 12/21/2017  3:53 PM  Calculated from:  Serum Creatinine: 1.1 mg/dL at 12/21/2017  3:53 PM  Serum Sodium: 126 mmol/L at 12/21/2017  3:53 PM  Total Bilirubin: 25.0 mg/dL at 12/21/2017  3:53 PM  INR(ratio): 2.8 at 12/21/2017  3:44 AM  Age: 52 years   - had half of her evaluation completed on last admission  - AS per Psychiatry patient is moderate risk, will proceed with Liver transplant evaluation  - EGD performed, esophageal varices banded,   - Mammogram performed on 12/4/17   - C-scope repeated on 12/5- good prep  - accepted for liver transplant, and has been listed today; transfer to LTS tomorrow   - IR paracentesis on 12/19- 4.6 L removed and negative for SBP

## 2017-12-22 NOTE — ASSESSMENT & PLAN NOTE
- Hyponatremia noted on am lab work.  - Plan for albumin for hydration and to aid in sodium level.   - Daily lab work to monitor closely.

## 2017-12-22 NOTE — ASSESSMENT & PLAN NOTE
- Pt with ESLD. Now listed for liver transplant.   - Daily assessment of ascites for paracentesis.

## 2017-12-22 NOTE — PLAN OF CARE
Problem: Patient Care Overview  Goal: Plan of Care Review  Outcome: Ongoing (interventions implemented as appropriate)   12/22/17 2885   Coping/Psychosocial   Plan Of Care Reviewed With patient     Assumed care of patient at 1900. VSS. Confused at times. Withdrawn, flat affect at times. Incontinent of stool and urine. Loose Bmx2 since last night. 3 Bms on day shift, night lactulose held. Following 500 liquid restriction. Bed alarm on, call light in reach, bed in lowest position. Will continue to monitor.

## 2017-12-22 NOTE — PROGRESS NOTES
[facility] Hospital Medicine  Progress Note    Patient Name: Marcie Bazan  MRN: [id number]  Patient Class: IP- Inpatient   Admission Date: 11/23/2017  Length of Stay: 28 days  Attending Physician: Harry Delarosa MD  Primary Care Provider: Milton Ferrer, [facility] Medicine Team: [facility] [place] Burton Vora MD    Subjective:     Principal Problem:Decompensated hepatic cirrhosis    HPI:  52 year old female with PMHx of 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 Patient presented to [facility] with generalized weakness. Onset two months ago, progressively worsening. Was admitted to  11/10 for hyponatremia seen on labs in GI office. Discharged on 11/15 and returned on 11/20 with 1 week worsening weakness, malaise, lethargy, and decreased appetite. Denied fever/chills, abdominal pain, bright red or black tarry stools, hematemesis or other abnormal bleeding, confusion or disorientation, changes in urination, light-headedness, headache, changes in vision, dyspnea, chest pain or palpitations. Labs demonstrated Na 116 and MELD 32; patient admitted for hyponatremia. Nephrology consulted, recommended fluid restriction , holding diuretics, and prednisode taper that started 11/23 (decreased from 40mg QD to 20mg Qd.) Patient transferred to [facility] for revaluation of liver disease. Of note, paracentesis performed 11/22, removed 2L, negative for SBP.    Follows with Dr. Souza for cirrhosis. Since early 09/2017, patient's liver failure progressing indicated by worsening clinical signs (abdominal distention, weakness/fatigue) and MELD. In mid-October, patient's PETH demonstrated EtOH use and she was denied candidacy for liver transplant. She was then enrolled in in-patient substance abuse program at [facility] where she has remained since  early November; per patient, her last EtOH beverage was 11/01/2017. Denies history of GI bleed, SBP, renal disease, or other complications of cirrhosis. Liver biopsy 10/24 demonstrated stage 4 cirrhosis. Records reports EGD in August 2016 that showed small esophageal varices with severe gastritis. Colonoscopy 2015 with 2 polyps and internal hemorrhoids Started on prednisone 40mg QD on 10/25; was on spironolactone, has been held since 11/10 for hypoNa; on lactulose.    Hospital Course:  Mental status improving with hepatic encephalopathy treatment. Started empiric ceftriaxone given leukocytosis in immunocompromised state. CMV PCR positive overnight, for which ID was consulted, recommended repeating titers in 1 week. Seen by Psychiatry and  who determined she was moderate risk for Liver transplant. Will proceed with EGD/Cscope and mammogram after FFP. Colonoscopy not performed due to poor prep, EGD revealed large varices which were banded. IR paracentesis ordered after patient started complaining of abdominal pain. Mammogram performed, IR paracentesis after INR Reversal negative for SBP. Patient accepted for Liver transplant pending review of previous mammograms.  Patient was slightly confused and altered, which resolved with lactulose enemas. She will need a core Breast Biopsy prior to listing in order to rule out malignancy.       Interval History: no biopsy necessary for patient, patient has been listed today and will be transferred to LTS tomorrow    Review of Systems   Constitutional: Negative for chills and fever.   HENT: Negative for rhinorrhea and sore throat.    Eyes: Negative for pain and discharge.   Respiratory: Negative for cough and shortness of breath.    Cardiovascular: Negative for chest pain and leg swelling.   Gastrointestinal: Positive for abdominal distention. Negative for abdominal pain, blood in stool, diarrhea, nausea and vomiting.   Endocrine: Negative for cold intolerance and  heat intolerance.   Genitourinary: Negative for dysuria and flank pain.   Neurological: Negative for dizziness and numbness.   Psychiatric/Behavioral: Negative for behavioral problems and confusion.     Objective:     Vital Signs (Most Recent):  Temp: 98.3 °F (36.8 °C) (12/21/17 2033)  Pulse: 98 (12/21/17 2033)  Resp: 15 (12/21/17 2033)  BP: (!) 114/56 (12/21/17 2033)  SpO2: 100 % (12/21/17 2033) Vital Signs (24h Range):  Temp:  [98.1 °F (36.7 °C)-98.6 °F (37 °C)] 98.3 °F (36.8 °C)  Pulse:  [] 98  Resp:  [15-20] 15  SpO2:  [95 %-100 %] 100 %  BP: (106-120)/(53-62) 114/56     Weight: 62.3 kg (137 lb 5.6 oz)  Body mass index is 22.86 kg/m².    Intake/Output Summary (Last 24 hours) at 12/21/17 2140  Last data filed at 12/21/17 1800   Gross per 24 hour   Intake              480 ml   Output                0 ml   Net              480 ml      Physical Exam   Constitutional: She is oriented to person, place, and time. She appears well-developed and well-nourished.   HENT:   Head: Normocephalic and atraumatic.   Eyes: Conjunctivae are normal. Pupils are equal, round, and reactive to light. Scleral icterus is present.   Neck: Normal range of motion. No thyromegaly present.   Cardiovascular: Normal rate, regular rhythm and normal heart sounds.    Pulmonary/Chest: Effort normal and breath sounds normal.   Abdominal: Soft. She exhibits distension. There is no tenderness.   Musculoskeletal: Normal range of motion. She exhibits no edema.   Neurological: She is alert and oriented to person, place, and time.   Skin: No erythema. No pallor.       MELD-Na score: 34 at 12/21/2017  3:53 PM  MELD score: 31 at 12/21/2017  3:53 PM  Calculated from:  Serum Creatinine: 1.1 mg/dL at 12/21/2017  3:53 PM  Serum Sodium: 126 mmol/L at 12/21/2017  3:53 PM  Total Bilirubin: 25.0 mg/dL at 12/21/2017  3:53 PM  INR(ratio): 2.8 at 12/21/2017  3:44 AM  Age: 52 years    Significant Labs:  CBC:    Recent Labs  Lab 12/20/17  0552 12/21/17  0344   WBC  11.20 10.61   HGB 9.2* 9.3*   HCT 25.9* 26.0*   * 126*     CMP:    Recent Labs  Lab 12/20/17  1552 12/21/17  0344 12/21/17  1553   * 124* 126*   K 3.4* 3.3* 3.9   CL 96 96 96   CO2 21* 16* 21*    106 134*   BUN 9 11 12   CREATININE 0.9 0.8 1.1   CALCIUM 8.9 9.1 9.0   PROT 6.2 6.0 6.0   ALBUMIN 2.7* 2.5* 2.8*   BILITOT 26.9* 24.9* 25.0*   ALKPHOS 247* 248* 236*   AST 62* 66* 62*   ALT 42 47* 40   ANIONGAP 9 12 9   EGFRNONAA >60.0 >60.0 57.1*     PTINR:    Recent Labs  Lab 12/20/17  0552 12/21/17  0344   INR 2.8* 2.8*       Significant Procedures:   Dobutamine Stress Test with Color Flow:   Results for orders placed or performed during the hospital encounter of 10/19/17   Dobutamine Stress Test w/ Color Flow   Result Value Ref Range    EF 73 55 - 65    Diastolic Dysfunction No     Est. PA Systolic Pressure 21.32     Tricuspid Valve Regurgitation TRIVIAL             Assessment/Plan:      * Decompensated hepatic cirrhosis    MELD-Na score: 34 at 12/21/2017  3:53 PM  MELD score: 31 at 12/21/2017  3:53 PM  Calculated from:  Serum Creatinine: 1.1 mg/dL at 12/21/2017  3:53 PM  Serum Sodium: 126 mmol/L at 12/21/2017  3:53 PM  Total Bilirubin: 25.0 mg/dL at 12/21/2017  3:53 PM  INR(ratio): 2.8 at 12/21/2017  3:44 AM  Age: 52 years   - had half of her evaluation completed on last admission  - AS per Psychiatry patient is moderate risk, will proceed with Liver transplant evaluation  - EGD performed, esophageal varices banded,   - Mammogram performed on 12/4/17   - C-scope repeated on 12/5- good prep  - accepted for liver transplant, and has been listed today; transfer to LTS tomorrow   - IR paracentesis on 12/19- 4.6 L removed and negative for SBP        Transplant donor evaluation    Presented at Liver selection committee on 12/06/17, approved pending Breast core biopsy to rule out malignancy          Immunosuppressed status    On chronic prednisone for alcoholic hepatitis; Prednisone has been weaned off,  last dose of 5mg of prednisone was on 12/15          Coagulopathy    INR 2.7 after 2 units of FFPs          Alcoholic cirrhosis of liver with ascites    - see decompensated cirrhosis   - IR paracentesis on 12/12 removed 5 liters         Cytomegalovirus (CMV) viremia    -- ID evaluated and do not feel it needs to be treated        Thrombocytopenia    -- monitor           Abnormal liver enzymes    -- Stable  -- Etiology liver failure; see that section for more details          Alcoholic hepatitis with ascites- decompensated with elevated MELD    - prednisone has been weaned off;         Hyponatremia    - on 500 cc fluid restriction; on albumin  - 116-->118-->120-->122-->123-->126-->125-->127-->131-->133-->136-->135-->132-->129-->127-->126        End stage liver disease              HCC (hepatocellular carcinoma)    AFP 9; seen on CT on 10/19, 1 cm focus, monitor for now          Hepatic encephalopathy    -- doing well; cont to have 3 to 4 BMs daily; cont lactulose and rifaximin and zinc        Severe protein-calorie malnutrition    PAB, boost and dietary consult          Ascites    -- Holding diuretics at present, s/p 2L removed on 11/22 with testing negative for SBP  -- No abdominal pain today    - IR paracentesis on 12/12/17 removed 5.2 liters               Essential hypertension    - Stable  - Holding home lisinopril in setting of decompensated liver disease          Anemia of chronic disease    - monitor daily            VTE Risk Mitigation         Ordered     Place sequential compression device  Until discontinued      11/24/17 0734     Place FABIOLA hose  Until discontinued      11/23/17 2305     Medium Risk of VTE  Once      11/23/17 2305              Burton Vora MD  Department of Hospital Medicine   [facility]

## 2017-12-22 NOTE — PROGRESS NOTES
[facility]  Liver Transplant  Progress Note    Patient Name: Marcie Bazan  MRN: [id number]  Admission Date: 11/23/2017  Hospital Length of Stay: 29 days  Code Status: Full Code  Primary Care Provider: ODILIA Wall    Subjective:     History of Present Illness:  HPI:  52 year old female with PMHx of 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 Patient presented to [facility] with generalized weakness. Onset two months ago, progressively worsening. Was admitted to  11/10 for hyponatremia seen on labs in GI office. Discharged on 11/15 and returned on 11/20 with 1 week worsening weakness, malaise, lethargy, and decreased appetite. Denied fever/chills, abdominal pain, bright red or black tarry stools, hematemesis or other abnormal bleeding, confusion or disorientation, changes in urination, light-headedness, headache, changes in vision, dyspnea, chest pain or palpitations. Labs demonstrated Na 116 and MELD 32; patient admitted for hyponatremia. Nephrology consulted, recommended fluid restriction , holding diuretics, and prednisode taper that started 11/23 (decreased from 40mg QD to 20mg Qd.) Patient transferred to [facility] for revaluation of liver disease. Of note, paracentesis performed 11/22, removed 2L, negative for SBP.     Follows with Dr. Souza for cirrhosis. Since early 09/2017, patient's liver failure progressing indicated by worsening clinical signs (abdominal distention, weakness/fatigue) and MELD. In mid-October, patient's PETH demonstrated EtOH use and she was denied candidacy for liver transplant. She was then enrolled in in-patient substance abuse program at [facility] where she has remained since early November; per patient, her last EtOH beverage was 11/01/2017. Denies history of GI bleed, SBP, renal disease, or other  complications of cirrhosis. Liver biopsy 10/24 demonstrated stage 4 cirrhosis. Records reports EGD in August 2016 that showed small esophageal varices with severe gastritis. Colonoscopy 2015 with 2 polyps and internal hemorrhoids Started on prednisone 40mg QD on 10/25; was on spironolactone, has been held since 11/10 for hypoNa; on lactulose.     Hospital Course:  Mental status improving with hepatic encephalopathy treatment. Started empiric ceftriaxone given leukocytosis in immunocompromised state. CMV PCR positive, for which ID was consulted, recommended repeating titers in 1 week. Seen by Psychiatry and  who determined she was moderate risk for Liver transplant. EGD/Cscope and mammogram ordered. Colonoscopy not performed due to poor prep, EGD revealed large varices which were banded. IR paracentesis ordered after patient started complaining of abdominal pain. Mammogram performed, IR paracentesis after INR Reversal negative for SBP. Patient accepted for Liver transplant now. Listed with MELD 34. Patient was slightly confused and altered, which resolved with lactulose enemas. As pt with low risk malignancy we will not proceed with a breast biopsy.        Hospital Course:  Interval history: no acute events overnight. Sleepy and more encephalopathic this am. Remains listed for liver transplant with a MELD of 34. Plan for albumin today for hydration. Recent paracentesis negative for SBP. Daily assessment for paracentesis. Monitor.     Scheduled Meds:   [START ON 12/23/2017] custom IVPB builder  1 g Intravenous Q24H    lactulose  30 g Oral TID    magnesium sulfate in water  2 g Intravenous Q2H    pantoprazole  40 mg Oral Daily    pramoxine-hydrocortisone   Rectal BID    rifAXImin  550 mg Oral BID    zinc sulfate  220 mg Oral Daily     Continuous Infusions:  PRN Meds:GI cocktail (mylanta 30 mL, lidocaine 2 % viscous 10 mL, dicyclomine 10 mL) 50 mL, ondansetron, ondansetron, simethicone, sodium  chloride 0.9%, sodium chloride 0.9%, sodium chloride 0.9%    Review of Systems   Constitutional: Positive for activity change, appetite change and fatigue. Negative for fever.   HENT: Negative.  Negative for congestion and facial swelling.    Eyes: Negative.    Respiratory: Negative.  Negative for chest tightness, shortness of breath and wheezing.    Cardiovascular: Negative.  Negative for chest pain, palpitations and leg swelling.   Gastrointestinal: Positive for abdominal distention and abdominal pain. Negative for constipation, diarrhea, nausea and vomiting.   Genitourinary: Negative.  Negative for decreased urine volume, difficulty urinating, dysuria, hematuria and urgency.   Musculoskeletal: Negative.  Negative for back pain, neck pain and neck stiffness.   Skin: Negative.  Negative for color change, pallor and rash.   Neurological: Positive for weakness. Negative for dizziness, seizures, speech difficulty, light-headedness and headaches.   Psychiatric/Behavioral: Positive for confusion and sleep disturbance. Negative for behavioral problems, hallucinations and suicidal ideas. The patient is nervous/anxious.      Objective:     Vital Signs (Most Recent):  Temp: 98 °F (36.7 °C) (12/22/17 1230)  Pulse: 100 (12/22/17 1230)  Resp: 18 (12/22/17 1230)  BP: (!) 99/52 (12/22/17 1230)  SpO2: 99 % (12/22/17 1230) Vital Signs (24h Range):  Temp:  [97.5 °F (36.4 °C)-98.4 °F (36.9 °C)] 98 °F (36.7 °C)  Pulse:  [] 100  Resp:  [15-18] 18  SpO2:  [98 %-100 %] 99 %  BP: ()/(51-57) 99/52     Weight: 62.3 kg (137 lb 5.6 oz)  Body mass index is 22.86 kg/m².    Intake/Output - Last 3 Shifts       12/20 0700 - 12/21 0659 12/21 0700 - 12/22 0659 12/22 0700 - 12/23 0659    P.O. 740 450     I.V. (mL/kg)  100 (1.6)     Total Intake(mL/kg) 740 (11.9) 550 (8.8)     Urine (mL/kg/hr)       Other       Stool       Total Output          Net +740 +550             Urine Occurrence 7 x 4 x     Stool Occurrence 7 x 7 x            Physical Exam   Constitutional: She is oriented to person, place, and time. She appears well-developed and well-nourished. No distress.   HENT:   Head: Normocephalic and atraumatic.   Eyes: Scleral icterus is present.   Neck: Normal range of motion. Neck supple.   Cardiovascular: Normal rate, regular rhythm and normal heart sounds.    No murmur heard.  Pulmonary/Chest: Effort normal. No respiratory distress. She has decreased breath sounds in the right lower field and the left lower field. She has no wheezes. She exhibits no tenderness.   Abdominal: Soft. Bowel sounds are normal. She exhibits distension. There is tenderness.   Musculoskeletal: Normal range of motion. She exhibits no edema or tenderness.   Neurological: She is alert and oriented to person, place, and time.   Skin: Skin is warm and dry. She is not diaphoretic.   Psychiatric: She has a normal mood and affect. Her behavior is normal. Judgment and thought content normal.   Nursing note and vitals reviewed.      Laboratory:  Immunosuppressants     None        CBC:   Recent Labs  Lab 12/22/17  0609   WBC 9.52   RBC 3.00*   HGB 9.4*   HCT 27.5*   *   MCV 92   MCH 31.3*   MCHC 34.2     CMP:   Recent Labs  Lab 12/22/17  0608   GLU 84   CALCIUM 8.9   ALBUMIN 2.8*   PROT 6.0   *   K 4.4   CO2 22*   CL 97   BUN 13   CREATININE 0.9   ALKPHOS 250*   ALT 42   AST 71*   BILITOT 24.6*     Coagulation:   Recent Labs  Lab 12/22/17  0609   INR 2.9*     Labs within the past 24 hours have been reviewed.    Diagnostic Results:  I have personally reviewed all pertinent imaging studies.    Assessment/Plan:     Alcoholic hepatitis with ascites- decompensated with elevated MELD    MELD-Na score: 33 at 12/22/2017  6:09 AM  MELD score: 30 at 12/22/2017  6:09 AM  Calculated from:  Serum Creatinine: 0.9 mg/dL (Rounded to 1) at 12/22/2017  6:08 AM  Serum Sodium: 127 mmol/L at 12/22/2017  6:08 AM  Total Bilirubin: 24.6 mg/dL at 12/22/2017  6:08 AM  INR(ratio): 2.9  at 12/22/2017  6:09 AM  Age: 52 years    Age: 52 years   - had half of her evaluation completed on last admission  - AS per Psychiatry patient is moderate risk.   - EGD performed, esophageal varices banded,   - Mammogram performed on 12/4/17   - C-scope repeated on 12/5- good prep  - accepted for liver transplant and now listed with a MELD of 34.    - IR paracentesis on 12/19- 4.6 L removed and negative for SBP          Decompensated hepatic cirrhosis    -           Ascites    - Last paracentesis on 12/19 with 4.6L off.   - Fluid neg for infection.           Hepatic encephalopathy    Increase lactulose today with added dose as pt with increased HE. Titrate to 4-5 BMs.           Anemia of chronic disease    - H&H stable. Monitor.           Hypomagnesemia    - IV replacement. Monitor.           Severe protein-calorie malnutrition    - Encourage PO intake for nutrition.           Hyponatremia    - Hyponatremia noted on am lab work.  - Plan for albumin for hydration and to aid in sodium level.   - Daily lab work to monitor closely.          Cytomegalovirus (CMV) viremia    - CMV PCR noted with 501 on 11/30.   - Plan to repeat in am.           Alcoholic cirrhosis of liver with ascites    - Pt with ESLD. Now listed for liver transplant.   - Daily assessment of ascites for paracentesis.           Coagulopathy    - INR remains elevated as a component of liver disease.   - no s/s of bleeding at this time.           Immunosuppressed status    Previously on chronic prednisone for alcoholic hepatitis; Prednisone has been weaned off, last dose of 5mg of prednisone was on 12/15              VTE Risk Mitigation         Ordered     Place sequential compression device  Until discontinued      11/24/17 0734     Place FABIOLA hose  Until discontinued      11/23/17 2305     Medium Risk of VTE  Once      11/23/17 2305          The patients clinical status was discussed at multidisplinary rounds, involving transplant surgery, transplant  medicine, pharmacy, nursing, nutrition, and social work    Discharge Planning:  Not a candidate for d/c at this time.     Devon Robles NP  Liver Transplant  [facility]

## 2017-12-22 NOTE — PLAN OF CARE
Problem: Patient Care Overview  Goal: Plan of Care Review  Outcome: Ongoing (interventions implemented as appropriate)  Pt resting in bed, denies pain, BM x 4 this shift, incontinent of urine and stool.  Electrolyte replacement per MAR.  Pt AO x 3, VSS , safety measures maintained, call light within reach.  No further needs assessed at this time.

## 2017-12-22 NOTE — SUBJECTIVE & OBJECTIVE
Interval History: no biopsy necessary for patient, patient has been listed today and will be transferred to [facility] tomorrow    Review of Systems   Constitutional: Negative for chills and fever.   HENT: Negative for rhinorrhea and sore throat.    Eyes: Negative for pain and discharge.   Respiratory: Negative for cough and shortness of breath.    Cardiovascular: Negative for chest pain and leg swelling.   Gastrointestinal: Positive for abdominal distention. Negative for abdominal pain, blood in stool, diarrhea, nausea and vomiting.   Endocrine: Negative for cold intolerance and heat intolerance.   Genitourinary: Negative for dysuria and flank pain.   Neurological: Negative for dizziness and numbness.   Psychiatric/Behavioral: Negative for behavioral problems and confusion.     Objective:     Vital Signs (Most Recent):  Temp: 98.3 °F (36.8 °C) (12/21/17 2033)  Pulse: 98 (12/21/17 2033)  Resp: 15 (12/21/17 2033)  BP: (!) 114/56 (12/21/17 2033)  SpO2: 100 % (12/21/17 2033) Vital Signs (24h Range):  Temp:  [98.1 °F (36.7 °C)-98.6 °F (37 °C)] 98.3 °F (36.8 °C)  Pulse:  [] 98  Resp:  [15-20] 15  SpO2:  [95 %-100 %] 100 %  BP: (106-120)/(53-62) 114/56     Weight: 62.3 kg (137 lb 5.6 oz)  Body mass index is 22.86 kg/m².    Intake/Output Summary (Last 24 hours) at 12/21/17 2140  Last data filed at 12/21/17 1800   Gross per 24 hour   Intake              480 ml   Output                0 ml   Net              480 ml      Physical Exam   Constitutional: She is oriented to person, place, and time. She appears well-developed and well-nourished.   HENT:   Head: Normocephalic and atraumatic.   Eyes: Conjunctivae are normal. Pupils are equal, round, and reactive to light. Scleral icterus is present.   Neck: Normal range of motion. No thyromegaly present.   Cardiovascular: Normal rate, regular rhythm and normal heart sounds.    Pulmonary/Chest: Effort normal and breath sounds normal.   Abdominal: Soft. She exhibits distension. There  is no tenderness.   Musculoskeletal: Normal range of motion. She exhibits no edema.   Neurological: She is alert and oriented to person, place, and time.   Skin: No erythema. No pallor.       MELD-Na score: 34 at 12/21/2017  3:53 PM  MELD score: 31 at 12/21/2017  3:53 PM  Calculated from:  Serum Creatinine: 1.1 mg/dL at 12/21/2017  3:53 PM  Serum Sodium: 126 mmol/L at 12/21/2017  3:53 PM  Total Bilirubin: 25.0 mg/dL at 12/21/2017  3:53 PM  INR(ratio): 2.8 at 12/21/2017  3:44 AM  Age: 52 years    Significant Labs:  CBC:    Recent Labs  Lab 12/20/17  0552 12/21/17  0344   WBC 11.20 10.61   HGB 9.2* 9.3*   HCT 25.9* 26.0*   * 126*     CMP:    Recent Labs  Lab 12/20/17  1552 12/21/17  0344 12/21/17  1553   * 124* 126*   K 3.4* 3.3* 3.9   CL 96 96 96   CO2 21* 16* 21*    106 134*   BUN 9 11 12   CREATININE 0.9 0.8 1.1   CALCIUM 8.9 9.1 9.0   PROT 6.2 6.0 6.0   ALBUMIN 2.7* 2.5* 2.8*   BILITOT 26.9* 24.9* 25.0*   ALKPHOS 247* 248* 236*   AST 62* 66* 62*   ALT 42 47* 40   ANIONGAP 9 12 9   EGFRNONAA >60.0 >60.0 57.1*     PTINR:    Recent Labs  Lab 12/20/17  0552 12/21/17 0344   INR 2.8* 2.8*       Significant Procedures:   Dobutamine Stress Test with Color Flow:   Results for orders placed or performed during the hospital encounter of 10/19/17   Dobutamine Stress Test w/ Color Flow   Result Value Ref Range    EF 73 55 - 65    Diastolic Dysfunction No     Est. PA Systolic Pressure 21.32     Tricuspid Valve Regurgitation TRIVIAL

## 2017-12-22 NOTE — HPI
HPI:  52 year old female with PMHx of 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 Patient presented to [facility] with generalized weakness. Onset two months ago, progressively worsening. Was admitted to  11/10 for hyponatremia seen on labs in GI office. Discharged on 11/15 and returned on 11/20 with 1 week worsening weakness, malaise, lethargy, and decreased appetite. Denied fever/chills, abdominal pain, bright red or black tarry stools, hematemesis or other abnormal bleeding, confusion or disorientation, changes in urination, light-headedness, headache, changes in vision, dyspnea, chest pain or palpitations. Labs demonstrated Na 116 and MELD 32; patient admitted for hyponatremia. Nephrology consulted, recommended fluid restriction , holding diuretics, and prednisode taper that started 11/23 (decreased from 40mg QD to 20mg Qd.) Patient transferred to [facility] for revaluation of liver disease. Of note, paracentesis performed 11/22, removed 2L, negative for SBP.     Follows with Dr. Souza for cirrhosis. Since early 09/2017, patient's liver failure progressing indicated by worsening clinical signs (abdominal distention, weakness/fatigue) and MELD. In mid-October, patient's PETH demonstrated EtOH use and she was denied candidacy for liver transplant. She was then enrolled in in-patient substance abuse program at [facility] where she has remained since early November; per patient, her last EtOH beverage was 11/01/2017. Denies history of GI bleed, SBP, renal disease, or other complications of cirrhosis. Liver biopsy 10/24 demonstrated stage 4 cirrhosis. Records reports EGD in August 2016 that showed small esophageal varices with severe gastritis. Colonoscopy 2015 with 2 polyps and internal hemorrhoids Started on prednisone 40mg QD on 10/25; was on spironolactone, has been  held since 11/10 for hypoNa; on lactulose.    Ms. Bazan is now s/p OLTX 12/26/17 for ETOH cirrhosis and PERKINS (steroid induction, DBD, CMV D-/R+). Post op course with confusion and disorientation. Mental status improving. Currently she is taking seroquel night. Nephrology following. CRRT started 12/28.

## 2017-12-23 ENCOUNTER — TELEPHONE (OUTPATIENT)
Dept: TRANSPLANT | Facility: HOSPITAL | Age: 52
End: 2017-12-23

## 2017-12-23 LAB
ALBUMIN SERPL BCP-MCNC: 2.7 G/DL
ALP SERPL-CCNC: 272 U/L
ALT SERPL W/O P-5'-P-CCNC: 48 U/L
ANION GAP SERPL CALC-SCNC: 9 MMOL/L
AST SERPL-CCNC: 81 U/L
BASOPHILS # BLD AUTO: 0.05 K/UL
BASOPHILS NFR BLD: 0.5 %
BILIRUB SERPL-MCNC: 23.6 MG/DL
BUN SERPL-MCNC: 15 MG/DL
CALCIUM SERPL-MCNC: 8.9 MG/DL
CHLORIDE SERPL-SCNC: 96 MMOL/L
CO2 SERPL-SCNC: 22 MMOL/L
CREAT SERPL-MCNC: 1.1 MG/DL
DIFFERENTIAL METHOD: ABNORMAL
EOSINOPHIL # BLD AUTO: 0 K/UL
EOSINOPHIL NFR BLD: 0.2 %
ERYTHROCYTE [DISTWIDTH] IN BLOOD BY AUTOMATED COUNT: 16.7 %
EST. GFR  (AFRICAN AMERICAN): >60 ML/MIN/1.73 M^2
EST. GFR  (NON AFRICAN AMERICAN): 57.1 ML/MIN/1.73 M^2
GLUCOSE SERPL-MCNC: 93 MG/DL
HCT VFR BLD AUTO: 25.2 %
HGB BLD-MCNC: 8.9 G/DL
IMM GRANULOCYTES # BLD AUTO: 0.06 K/UL
IMM GRANULOCYTES NFR BLD AUTO: 0.6 %
INR PPP: 2.8
LYMPHOCYTES # BLD AUTO: 3.1 K/UL
LYMPHOCYTES NFR BLD: 31.5 %
MAGNESIUM SERPL-MCNC: 2.3 MG/DL
MCH RBC QN AUTO: 31.2 PG
MCHC RBC AUTO-ENTMCNC: 35.3 G/DL
MCV RBC AUTO: 88 FL
MONOCYTES # BLD AUTO: 1.5 K/UL
MONOCYTES NFR BLD: 15.6 %
NEUTROPHILS # BLD AUTO: 5.1 K/UL
NEUTROPHILS NFR BLD: 51.6 %
NRBC BLD-RTO: 0 /100 WBC
PHOSPHATE SERPL-MCNC: 2.9 MG/DL
PLATELET # BLD AUTO: 121 K/UL
PMV BLD AUTO: 10.3 FL
POTASSIUM SERPL-SCNC: 3.7 MMOL/L
PROT SERPL-MCNC: 6.1 G/DL
PROTHROMBIN TIME: 27.7 SEC
RBC # BLD AUTO: 2.85 M/UL
SODIUM SERPL-SCNC: 127 MMOL/L
WBC # BLD AUTO: 9.88 K/UL

## 2017-12-23 PROCEDURE — 25000003 PHARM REV CODE 250: Mod: NTX | Performed by: STUDENT IN AN ORGANIZED HEALTH CARE EDUCATION/TRAINING PROGRAM

## 2017-12-23 PROCEDURE — 80053 COMPREHEN METABOLIC PANEL: CPT | Mod: NTX

## 2017-12-23 PROCEDURE — 83735 ASSAY OF MAGNESIUM: CPT | Mod: NTX

## 2017-12-23 PROCEDURE — 25000003 PHARM REV CODE 250: Mod: NTX | Performed by: INTERNAL MEDICINE

## 2017-12-23 PROCEDURE — 99233 SBSQ HOSP IP/OBS HIGH 50: CPT | Mod: NTX,,, | Performed by: NURSE PRACTITIONER

## 2017-12-23 PROCEDURE — 84100 ASSAY OF PHOSPHORUS: CPT | Mod: NTX

## 2017-12-23 PROCEDURE — 25000003 PHARM REV CODE 250: Mod: NTX | Performed by: NURSE PRACTITIONER

## 2017-12-23 PROCEDURE — 85025 COMPLETE CBC W/AUTO DIFF WBC: CPT | Mod: NTX

## 2017-12-23 PROCEDURE — 85610 PROTHROMBIN TIME: CPT | Mod: NTX

## 2017-12-23 PROCEDURE — 20600001 HC STEP DOWN PRIVATE ROOM: Mod: NTX

## 2017-12-23 PROCEDURE — 63600175 PHARM REV CODE 636 W HCPCS: Mod: NTX | Performed by: NURSE PRACTITIONER

## 2017-12-23 PROCEDURE — 25000003 PHARM REV CODE 250: Mod: NTX | Performed by: HOSPITALIST

## 2017-12-23 PROCEDURE — 36415 COLL VENOUS BLD VENIPUNCTURE: CPT | Mod: NTX

## 2017-12-23 RX ORDER — ZINC SULFATE 50(220)MG
220 CAPSULE ORAL 2 TIMES DAILY
Status: DISCONTINUED | OUTPATIENT
Start: 2017-12-23 | End: 2017-12-26

## 2017-12-23 RX ADMIN — LACTULOSE 30 G: 20 SOLUTION ORAL at 05:12

## 2017-12-23 RX ADMIN — CEFTRIAXONE SODIUM 1 G: 1 INJECTION, POWDER, FOR SOLUTION INTRAMUSCULAR; INTRAVENOUS at 05:12

## 2017-12-23 RX ADMIN — RIFAXIMIN 550 MG: 550 TABLET ORAL at 08:12

## 2017-12-23 RX ADMIN — HYDROCORTISONE ACETATE AND PRAMOXINE HYDROCHLORIDE: 10; 10 CREAM TOPICAL at 08:12

## 2017-12-23 RX ADMIN — Medication 220 MG: at 09:12

## 2017-12-23 RX ADMIN — HYDROCORTISONE ACETATE AND PRAMOXINE HYDROCHLORIDE: 10; 10 CREAM TOPICAL at 09:12

## 2017-12-23 RX ADMIN — PANTOPRAZOLE SODIUM 40 MG: 40 TABLET, DELAYED RELEASE ORAL at 08:12

## 2017-12-23 RX ADMIN — RIFAXIMIN 550 MG: 550 TABLET ORAL at 09:12

## 2017-12-23 RX ADMIN — Medication 220 MG: at 08:12

## 2017-12-23 NOTE — ASSESSMENT & PLAN NOTE
MELD-Na score: 34 at 12/23/2017  4:41 AM  MELD score: 31 at 12/23/2017  4:41 AM  Calculated from:  Serum Creatinine: 1.1 mg/dL at 12/23/2017  4:41 AM  Serum Sodium: 127 mmol/L at 12/23/2017  4:41 AM  Total Bilirubin: 23.6 mg/dL at 12/23/2017  4:41 AM  INR(ratio): 2.8 at 12/23/2017  4:41 AM  Age: 52 years    Age: 52 years   - had half of her evaluation completed on last admission  - AS per Psychiatry patient is moderate risk.   - EGD performed, esophageal varices banded,   - Mammogram performed on 12/4/17   - C-scope repeated on 12/5- good prep  - accepted for liver transplant and now listed with a MELD of 34.    - IR paracentesis on 12/19- 4.6 L removed and negative for SBP

## 2017-12-23 NOTE — TELEPHONE ENCOUNTER
MELD-Na score: 34 at 12/23/2017  4:41 AM  MELD score: 31 at 12/23/2017  4:41 AM  Calculated from:  Serum Creatinine: 1.1 mg/dL at 12/23/2017  4:41 AM  Serum Sodium: 127 mmol/L at 12/23/2017  4:41 AM  Total Bilirubin: 23.6 mg/dL at 12/23/2017  4:41 AM  INR(ratio): 2.8 at 12/23/2017  4:41 AM  Age: 52 years    Encephalopathy: yes  Ascites: yes  Dialysis:no      Functional Status (Karnofsky Score): 30% - Severely disabled: hospitalization is indicated, death not imminent  Physical Capacity: No Limitations      Recertification form sent on 12/23/2017.    Recertification requestor: Benji Lund MD

## 2017-12-23 NOTE — ASSESSMENT & PLAN NOTE
Now more alert and oriented vs yesterday. Cont with lactulose for HE control. Titrate to 4-5 BMs.

## 2017-12-23 NOTE — ASSESSMENT & PLAN NOTE
- Hyponatremia remains stable.  - albumin given on 12/22.    - Daily lab work to monitor closely.

## 2017-12-23 NOTE — SUBJECTIVE & OBJECTIVE
Scheduled Meds:   custom IVPB builder  1 g Intravenous Q24H    lactulose  30 g Oral TID    pantoprazole  40 mg Oral Daily    pramoxine-hydrocortisone   Rectal BID    rifAXImin  550 mg Oral BID    zinc sulfate  220 mg Oral BID     Continuous Infusions:  PRN Meds:GI cocktail (mylanta 30 mL, lidocaine 2 % viscous 10 mL, dicyclomine 10 mL) 50 mL, ondansetron, ondansetron, simethicone, sodium chloride 0.9%, sodium chloride 0.9%, sodium chloride 0.9%    Review of Systems   Constitutional: Positive for activity change, appetite change and fatigue. Negative for fever.   HENT: Negative.  Negative for congestion and facial swelling.    Eyes: Negative.    Respiratory: Negative.  Negative for chest tightness, shortness of breath and wheezing.    Cardiovascular: Negative.  Negative for chest pain, palpitations and leg swelling.   Gastrointestinal: Positive for abdominal distention and abdominal pain. Negative for constipation, diarrhea, nausea and vomiting.   Genitourinary: Negative.  Negative for decreased urine volume, difficulty urinating, dysuria, hematuria and urgency.   Musculoskeletal: Negative.  Negative for back pain, neck pain and neck stiffness.   Skin: Negative.  Negative for color change, pallor and rash.   Neurological: Positive for weakness. Negative for dizziness, seizures, speech difficulty, light-headedness and headaches.   Psychiatric/Behavioral: Positive for confusion and sleep disturbance. Negative for behavioral problems, hallucinations and suicidal ideas. The patient is nervous/anxious.      Objective:     Vital Signs (Most Recent):  Temp: 97.4 °F (36.3 °C) (12/23/17 0807)  Pulse: 99 (12/23/17 0807)  Resp: 16 (12/23/17 0807)  BP: 114/66 (12/23/17 0807)  SpO2: 100 % (12/23/17 0807) Vital Signs (24h Range):  Temp:  [97.4 °F (36.3 °C)-99 °F (37.2 °C)] 97.4 °F (36.3 °C)  Pulse:  [] 99  Resp:  [16-18] 16  SpO2:  [99 %-100 %] 100 %  BP: ()/(51-66) 114/66     Weight: 53.3 kg (117 lb 8.1  oz)  Body mass index is 19.55 kg/m².    Intake/Output - Last 3 Shifts       12/21 0700 - 12/22 0659 12/22 0700 - 12/23 0659 12/23 0700 - 12/24 0659    P.O. 450 50 100    I.V. (mL/kg) 100 (1.6)      IV Piggyback   50    Total Intake(mL/kg) 550 (8.8) 50 (0.8) 150 (2.8)    Urine (mL/kg/hr)   0 (0)    Emesis/NG output   0 (0)    Stool   0 (0)    Total Output     0    Net +550 +50 +150           Urine Occurrence 4 x  0 x    Stool Occurrence 7 x 2 x 4 x    Emesis Occurrence   0 x          Physical Exam   Constitutional: She is oriented to person, place, and time. She appears well-developed and well-nourished. No distress.   HENT:   Head: Normocephalic and atraumatic.   Eyes: Scleral icterus is present.   Neck: Normal range of motion. Neck supple.   Cardiovascular: Normal rate, regular rhythm and normal heart sounds.    No murmur heard.  Pulmonary/Chest: Effort normal. No respiratory distress. She has decreased breath sounds in the right lower field and the left lower field. She has no wheezes. She exhibits no tenderness.   Abdominal: Soft. Bowel sounds are normal. She exhibits distension. There is tenderness.   Musculoskeletal: Normal range of motion. She exhibits no edema or tenderness.   Neurological: She is alert and oriented to person, place, and time.   Skin: Skin is warm and dry. She is not diaphoretic.   Psychiatric: She has a normal mood and affect. Her behavior is normal. Judgment and thought content normal.   Nursing note and vitals reviewed.      Laboratory:  Immunosuppressants     None        CBC:   Recent Labs  Lab 12/23/17 0441   WBC 9.88   RBC 2.85*   HGB 8.9*   HCT 25.2*   *   MCV 88   MCH 31.2*   MCHC 35.3     CMP:   Recent Labs  Lab 12/23/17 0441   GLU 93   CALCIUM 8.9   ALBUMIN 2.7*   PROT 6.1   *   K 3.7   CO2 22*   CL 96   BUN 15   CREATININE 1.1   ALKPHOS 272*   ALT 48*   AST 81*   BILITOT 23.6*     Coagulation:   Recent Labs  Lab 12/23/17 0441   INR 2.8*     Labs within the past 24  hours have been reviewed.    Diagnostic Results:  I have personally reviewed all pertinent imaging studies.

## 2017-12-23 NOTE — PLAN OF CARE
Problem: Patient Care Overview  Goal: Plan of Care Review  Outcome: Ongoing (interventions implemented as appropriate)   12/23/17 0404   Coping/Psychosocial   Plan Of Care Reviewed With patient     Assumed care of patient at 1900. Aox3. Withdrawn. Incontinent of stool. 3 Bms through the night. VSS. No complaints of pain. No concerns voiced. Free of falls. Bed in lowest position, call light in reach, bed alarm on . Will continue to monitor.     Problem: Fall Risk (Adult)  Intervention: Patient Rounds   12/23/17 0200   Safety Interventions   Patient Rounds bed in low position;bed wheels locked;call light in reach;clutter free environment maintained;ID band on;placement of personal items at bedside;toileting offered;visualized patient     Intervention: Safety Promotion/Fall Prevention   12/23/17 0200   Safety Interventions   Safety Promotion/Fall Prevention assistive device/personal item within reach;bed alarm set;instructed to call staff for mobility;upper side rails raised x 2, lower siderails raised x 1 (Peds only)       Goal: Absence of Falls  Patient will demonstrate the desired outcomes by discharge/transition of care.   Outcome: Ongoing (interventions implemented as appropriate)   12/23/17 0404   Fall Risk (Adult)   Absence of Falls achieves outcome

## 2017-12-23 NOTE — PROGRESS NOTES
[facility]  Liver Transplant  Progress Note    Patient Name: Marcie Bazan  MRN: [id number]  Admission Date: 11/23/2017  Hospital Length of Stay: 30 days  Code Status: Full Code  Primary Care Provider: ODILIA Wall    Subjective:     History of Present Illness:  HPI:  52 year old female with PMHx of 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 Patient presented to [facility] with generalized weakness. Onset two months ago, progressively worsening. Was admitted to  11/10 for hyponatremia seen on labs in GI office. Discharged on 11/15 and returned on 11/20 with 1 week worsening weakness, malaise, lethargy, and decreased appetite. Denied fever/chills, abdominal pain, bright red or black tarry stools, hematemesis or other abnormal bleeding, confusion or disorientation, changes in urination, light-headedness, headache, changes in vision, dyspnea, chest pain or palpitations. Labs demonstrated Na 116 and MELD 32; patient admitted for hyponatremia. Nephrology consulted, recommended fluid restriction , holding diuretics, and prednisode taper that started 11/23 (decreased from 40mg QD to 20mg Qd.) Patient transferred to [facility] for revaluation of liver disease. Of note, paracentesis performed 11/22, removed 2L, negative for SBP.     Follows with Dr. Souza for cirrhosis. Since early 09/2017, patient's liver failure progressing indicated by worsening clinical signs (abdominal distention, weakness/fatigue) and MELD. In mid-October, patient's PETH demonstrated EtOH use and she was denied candidacy for liver transplant. She was then enrolled in in-patient substance abuse program at [facility] where she has remained since early November; per patient, her last EtOH beverage was 11/01/2017. Denies history of GI bleed, SBP, renal disease, or other  complications of cirrhosis. Liver biopsy 10/24 demonstrated stage 4 cirrhosis. Records reports EGD in August 2016 that showed small esophageal varices with severe gastritis. Colonoscopy 2015 with 2 polyps and internal hemorrhoids Started on prednisone 40mg QD on 10/25; was on spironolactone, has been held since 11/10 for hypoNa; on lactulose.     Hospital Course:  Mental status improving with hepatic encephalopathy treatment. Started empiric ceftriaxone given leukocytosis in immunocompromised state. CMV PCR positive, for which ID was consulted, recommended repeating titers in 1 week. Seen by Psychiatry and  who determined she was moderate risk for Liver transplant. EGD/Cscope and mammogram ordered. Colonoscopy not performed due to poor prep, EGD revealed large varices which were banded. IR paracentesis ordered after patient started complaining of abdominal pain. Mammogram performed, IR paracentesis after INR Reversal negative for SBP. Patient accepted for Liver transplant now. Listed with MELD 34. Patient was slightly confused and altered, which resolved with lactulose enemas. As pt with low risk malignancy we will not proceed with a breast biopsy.        Hospital Course:  Interval history: no acute events overnight. Now more awake and intervactive this am. With 4-5 BMs yesterday. Cont with lactulose for HE control. Remains listed for liver transplant with a MELD of 34. Pt remains stable and ready for transplant. Recent paracentesis negative for SBP. Daily assessment for paracentesis. Monitor.     Scheduled Meds:   custom IVPB builder  1 g Intravenous Q24H    lactulose  30 g Oral TID    pantoprazole  40 mg Oral Daily    pramoxine-hydrocortisone   Rectal BID    rifAXImin  550 mg Oral BID    zinc sulfate  220 mg Oral BID     Continuous Infusions:  PRN Meds:GI cocktail (mylanta 30 mL, lidocaine 2 % viscous 10 mL, dicyclomine 10 mL) 50 mL, ondansetron, ondansetron, simethicone, sodium chloride 0.9%,  sodium chloride 0.9%, sodium chloride 0.9%    Review of Systems   Constitutional: Positive for activity change, appetite change and fatigue. Negative for fever.   HENT: Negative.  Negative for congestion and facial swelling.    Eyes: Negative.    Respiratory: Negative.  Negative for chest tightness, shortness of breath and wheezing.    Cardiovascular: Negative.  Negative for chest pain, palpitations and leg swelling.   Gastrointestinal: Positive for abdominal distention and abdominal pain. Negative for constipation, diarrhea, nausea and vomiting.   Genitourinary: Negative.  Negative for decreased urine volume, difficulty urinating, dysuria, hematuria and urgency.   Musculoskeletal: Negative.  Negative for back pain, neck pain and neck stiffness.   Skin: Negative.  Negative for color change, pallor and rash.   Neurological: Positive for weakness. Negative for dizziness, seizures, speech difficulty, light-headedness and headaches.   Psychiatric/Behavioral: Positive for confusion and sleep disturbance. Negative for behavioral problems, hallucinations and suicidal ideas. The patient is nervous/anxious.      Objective:     Vital Signs (Most Recent):  Temp: 97.4 °F (36.3 °C) (12/23/17 0807)  Pulse: 99 (12/23/17 0807)  Resp: 16 (12/23/17 0807)  BP: 114/66 (12/23/17 0807)  SpO2: 100 % (12/23/17 0807) Vital Signs (24h Range):  Temp:  [97.4 °F (36.3 °C)-99 °F (37.2 °C)] 97.4 °F (36.3 °C)  Pulse:  [] 99  Resp:  [16-18] 16  SpO2:  [99 %-100 %] 100 %  BP: ()/(51-66) 114/66     Weight: 53.3 kg (117 lb 8.1 oz)  Body mass index is 19.55 kg/m².    Intake/Output - Last 3 Shifts       12/21 0700 - 12/22 0659 12/22 0700 - 12/23 0659 12/23 0700 - 12/24 0659    P.O. 450 50 100    I.V. (mL/kg) 100 (1.6)      IV Piggyback   50    Total Intake(mL/kg) 550 (8.8) 50 (0.8) 150 (2.8)    Urine (mL/kg/hr)   0 (0)    Emesis/NG output   0 (0)    Stool   0 (0)    Total Output     0    Net +550 +50 +150           Urine Occurrence 4 x  0 x     Stool Occurrence 7 x 2 x 4 x    Emesis Occurrence   0 x          Physical Exam   Constitutional: She is oriented to person, place, and time. She appears well-developed and well-nourished. No distress.   HENT:   Head: Normocephalic and atraumatic.   Eyes: Scleral icterus is present.   Neck: Normal range of motion. Neck supple.   Cardiovascular: Normal rate, regular rhythm and normal heart sounds.    No murmur heard.  Pulmonary/Chest: Effort normal. No respiratory distress. She has decreased breath sounds in the right lower field and the left lower field. She has no wheezes. She exhibits no tenderness.   Abdominal: Soft. Bowel sounds are normal. She exhibits distension. There is tenderness.   Musculoskeletal: Normal range of motion. She exhibits no edema or tenderness.   Neurological: She is alert and oriented to person, place, and time.   Skin: Skin is warm and dry. She is not diaphoretic.   Psychiatric: She has a normal mood and affect. Her behavior is normal. Judgment and thought content normal.   Nursing note and vitals reviewed.      Laboratory:  Immunosuppressants     None        CBC:   Recent Labs  Lab 12/23/17  0441   WBC 9.88   RBC 2.85*   HGB 8.9*   HCT 25.2*   *   MCV 88   MCH 31.2*   MCHC 35.3     CMP:   Recent Labs  Lab 12/23/17 0441   GLU 93   CALCIUM 8.9   ALBUMIN 2.7*   PROT 6.1   *   K 3.7   CO2 22*   CL 96   BUN 15   CREATININE 1.1   ALKPHOS 272*   ALT 48*   AST 81*   BILITOT 23.6*     Coagulation:   Recent Labs  Lab 12/23/17 0441   INR 2.8*     Labs within the past 24 hours have been reviewed.    Diagnostic Results:  I have personally reviewed all pertinent imaging studies.    Assessment/Plan:     * Alcoholic hepatitis with ascites- decompensated with elevated MELD    MELD-Na score: 34 at 12/23/2017  4:41 AM  MELD score: 31 at 12/23/2017  4:41 AM  Calculated from:  Serum Creatinine: 1.1 mg/dL at 12/23/2017  4:41 AM  Serum Sodium: 127 mmol/L at 12/23/2017  4:41 AM  Total Bilirubin:  23.6 mg/dL at 12/23/2017  4:41 AM  INR(ratio): 2.8 at 12/23/2017  4:41 AM  Age: 52 years    Age: 52 years   - had half of her evaluation completed on last admission  - AS per Psychiatry patient is moderate risk.   - EGD performed, esophageal varices banded,   - Mammogram performed on 12/4/17   - C-scope repeated on 12/5- good prep  - accepted for liver transplant and now listed with a MELD of 34.    - IR paracentesis on 12/19- 4.6 L removed and negative for SBP          Decompensated hepatic cirrhosis    -           Ascites    - Last paracentesis on 12/19 with 4.6L off.   - Fluid neg for infection.           Hepatic encephalopathy    Now more alert and oriented vs yesterday. Cont with lactulose for HE control. Titrate to 4-5 BMs.           Anemia of chronic disease    - H&H stable. Monitor.           Hypomagnesemia    - Stable. Monitor.           Severe protein-calorie malnutrition    - Encourage PO intake for nutrition.           Hyponatremia    - Hyponatremia remains stable.  - albumin given on 12/22.    - Daily lab work to monitor closely.          Cytomegalovirus (CMV) viremia    - CMV PCR noted with 501 on 11/30.   - Plan to repeat in am.           Alcoholic cirrhosis of liver with ascites    - Pt with ESLD. Now listed for liver transplant.   - Daily assessment of ascites for paracentesis.           Coagulopathy    - INR remains elevated as a component of liver disease.   - no s/s of bleeding at this time.           Immunosuppressed status    Previously on chronic prednisone for alcoholic hepatitis; Prednisone has been weaned off, last dose of 5mg of prednisone was on 12/15              VTE Risk Mitigation         Ordered     Place sequential compression device  Until discontinued      11/24/17 0734     Place FABIOLA hose  Until discontinued      11/23/17 2305     Medium Risk of VTE  Once      11/23/17 2305          The patients clinical status was discussed at multidisplinary rounds, involving transplant surgery,  transplant medicine, pharmacy, nursing, nutrition, and social work    Discharge Planning:  Not a candidate for d/c at this time.    Devon Robles NP  Liver Transplant  [facility]

## 2017-12-24 ENCOUNTER — TELEPHONE (OUTPATIENT)
Dept: TRANSPLANT | Facility: HOSPITAL | Age: 52
End: 2017-12-24

## 2017-12-24 LAB
25(OH)D3+25(OH)D2 SERPL-MCNC: 4 NG/ML
ALBUMIN SERPL BCP-MCNC: 2.6 G/DL
ALP SERPL-CCNC: 293 U/L
ALT SERPL W/O P-5'-P-CCNC: 48 U/L
ANION GAP SERPL CALC-SCNC: 9 MMOL/L
AST SERPL-CCNC: 93 U/L
BACTERIA UR CULT: NORMAL
BASOPHILS # BLD AUTO: 0.09 K/UL
BASOPHILS NFR BLD: 0.9 %
BILIRUB SERPL-MCNC: 24 MG/DL
BUN SERPL-MCNC: 17 MG/DL
CALCIUM SERPL-MCNC: 8.7 MG/DL
CHLORIDE SERPL-SCNC: 95 MMOL/L
CO2 SERPL-SCNC: 20 MMOL/L
CREAT SERPL-MCNC: 1.1 MG/DL
DIFFERENTIAL METHOD: ABNORMAL
EOSINOPHIL # BLD AUTO: 0 K/UL
EOSINOPHIL NFR BLD: 0.4 %
ERYTHROCYTE [DISTWIDTH] IN BLOOD BY AUTOMATED COUNT: 16.1 %
EST. GFR  (AFRICAN AMERICAN): >60 ML/MIN/1.73 M^2
EST. GFR  (NON AFRICAN AMERICAN): 57.1 ML/MIN/1.73 M^2
GLUCOSE SERPL-MCNC: 87 MG/DL
HCT VFR BLD AUTO: 25 %
HGB BLD-MCNC: 9 G/DL
IMM GRANULOCYTES # BLD AUTO: 0.05 K/UL
IMM GRANULOCYTES NFR BLD AUTO: 0.5 %
INR PPP: 2.8
LYMPHOCYTES # BLD AUTO: 2.6 K/UL
LYMPHOCYTES NFR BLD: 25.2 %
MAGNESIUM SERPL-MCNC: 1.8 MG/DL
MCH RBC QN AUTO: 30.9 PG
MCHC RBC AUTO-ENTMCNC: 36 G/DL
MCV RBC AUTO: 86 FL
MONOCYTES # BLD AUTO: 1.2 K/UL
MONOCYTES NFR BLD: 11.6 %
NEUTROPHILS # BLD AUTO: 6.3 K/UL
NEUTROPHILS NFR BLD: 61.4 %
NRBC BLD-RTO: 0 /100 WBC
PHOSPHATE SERPL-MCNC: 3.4 MG/DL
PLATELET # BLD AUTO: 115 K/UL
PMV BLD AUTO: 10.1 FL
POTASSIUM SERPL-SCNC: 4 MMOL/L
PROT SERPL-MCNC: 6.2 G/DL
PROTHROMBIN TIME: 28.2 SEC
RBC # BLD AUTO: 2.91 M/UL
SODIUM SERPL-SCNC: 124 MMOL/L
WBC # BLD AUTO: 10.23 K/UL

## 2017-12-24 PROCEDURE — 84590 ASSAY OF VITAMIN A: CPT | Mod: NTX

## 2017-12-24 PROCEDURE — 25000003 PHARM REV CODE 250: Mod: NTX | Performed by: HOSPITALIST

## 2017-12-24 PROCEDURE — 20600001 HC STEP DOWN PRIVATE ROOM: Mod: NTX

## 2017-12-24 PROCEDURE — 85610 PROTHROMBIN TIME: CPT | Mod: NTX

## 2017-12-24 PROCEDURE — 84100 ASSAY OF PHOSPHORUS: CPT | Mod: NTX

## 2017-12-24 PROCEDURE — 85025 COMPLETE CBC W/AUTO DIFF WBC: CPT | Mod: NTX

## 2017-12-24 PROCEDURE — 82306 VITAMIN D 25 HYDROXY: CPT | Mod: NTX

## 2017-12-24 PROCEDURE — 63600175 PHARM REV CODE 636 W HCPCS: Mod: NTX | Performed by: NURSE PRACTITIONER

## 2017-12-24 PROCEDURE — P9047 ALBUMIN (HUMAN), 25%, 50ML: HCPCS | Mod: NTX | Performed by: NURSE PRACTITIONER

## 2017-12-24 PROCEDURE — 80053 COMPREHEN METABOLIC PANEL: CPT | Mod: NTX

## 2017-12-24 PROCEDURE — 83735 ASSAY OF MAGNESIUM: CPT | Mod: NTX

## 2017-12-24 PROCEDURE — 36415 COLL VENOUS BLD VENIPUNCTURE: CPT | Mod: NTX

## 2017-12-24 PROCEDURE — 25000003 PHARM REV CODE 250: Mod: NTX | Performed by: NURSE PRACTITIONER

## 2017-12-24 PROCEDURE — 25000003 PHARM REV CODE 250: Mod: NTX | Performed by: INTERNAL MEDICINE

## 2017-12-24 PROCEDURE — 25000003 PHARM REV CODE 250: Mod: NTX | Performed by: STUDENT IN AN ORGANIZED HEALTH CARE EDUCATION/TRAINING PROGRAM

## 2017-12-24 PROCEDURE — 99233 SBSQ HOSP IP/OBS HIGH 50: CPT | Mod: NTX,,, | Performed by: NURSE PRACTITIONER

## 2017-12-24 RX ORDER — ALBUMIN HUMAN 250 G/1000ML
25 SOLUTION INTRAVENOUS
Status: COMPLETED | OUTPATIENT
Start: 2017-12-24 | End: 2017-12-24

## 2017-12-24 RX ADMIN — RIFAXIMIN 550 MG: 550 TABLET ORAL at 09:12

## 2017-12-24 RX ADMIN — RIFAXIMIN 550 MG: 550 TABLET ORAL at 08:12

## 2017-12-24 RX ADMIN — ALBUMIN (HUMAN) 25 G: 12.5 SOLUTION INTRAVENOUS at 05:12

## 2017-12-24 RX ADMIN — LACTULOSE 30 G: 20 SOLUTION ORAL at 06:12

## 2017-12-24 RX ADMIN — VANCOMYCIN HYDROCHLORIDE 750 MG: 750 INJECTION, POWDER, LYOPHILIZED, FOR SOLUTION INTRAVENOUS at 11:12

## 2017-12-24 RX ADMIN — LACTULOSE 30 G: 20 SOLUTION ORAL at 03:12

## 2017-12-24 RX ADMIN — HYDROCORTISONE ACETATE AND PRAMOXINE HYDROCHLORIDE: 10; 10 CREAM TOPICAL at 09:12

## 2017-12-24 RX ADMIN — HYDROCORTISONE ACETATE AND PRAMOXINE HYDROCHLORIDE: 10; 10 CREAM TOPICAL at 08:12

## 2017-12-24 RX ADMIN — CEFTRIAXONE SODIUM 1 G: 1 INJECTION, POWDER, FOR SOLUTION INTRAMUSCULAR; INTRAVENOUS at 06:12

## 2017-12-24 RX ADMIN — Medication 220 MG: at 08:12

## 2017-12-24 RX ADMIN — PANTOPRAZOLE SODIUM 40 MG: 40 TABLET, DELAYED RELEASE ORAL at 09:12

## 2017-12-24 RX ADMIN — ALBUMIN (HUMAN) 25 G: 12.5 SOLUTION INTRAVENOUS at 09:12

## 2017-12-24 RX ADMIN — Medication 220 MG: at 09:12

## 2017-12-24 NOTE — ASSESSMENT & PLAN NOTE
- with worsening hyponatremia levels. Albumin given today x2.   - Daily lab work to monitor closely.

## 2017-12-24 NOTE — TELEPHONE ENCOUNTER
MELD-Na score: 35 at 12/24/2017  4:55 AM  MELD score: 31 at 12/24/2017  4:55 AM  Calculated from:  Serum Creatinine: 1.1 mg/dL at 12/24/2017  4:55 AM  Serum Sodium: 124 mmol/L (Rounded to 125) at 12/24/2017  4:55 AM  Total Bilirubin: 24.0 mg/dL at 12/24/2017  4:55 AM  INR(ratio): 2.8 at 12/24/2017  4:55 AM  Age: 52 years    Encephalopathy: yes  Ascites: yes  Dialysis:no      Functional Status (Karnofsky Score): 30% - Severely disabled: hospitalization is indicated, death not imminent  Physical Capacity: No Limitations      Recertification form sent on 12/24/2017.    Recertification requestor: Benji Lund MD

## 2017-12-24 NOTE — PROGRESS NOTES
[facility]  Liver Transplant  Progress Note    Patient Name: Marcie Bazan  MRN: [id number]  Admission Date: 11/23/2017  Hospital Length of Stay: 31 days  Code Status: Full Code  Primary Care Provider: ODILIA Wall    Subjective:     History of Present Illness:  HPI:  52 year old female with PMHx of 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 Patient presented to [facility] with generalized weakness. Onset two months ago, progressively worsening. Was admitted to  11/10 for hyponatremia seen on labs in GI office. Discharged on 11/15 and returned on 11/20 with 1 week worsening weakness, malaise, lethargy, and decreased appetite. Denied fever/chills, abdominal pain, bright red or black tarry stools, hematemesis or other abnormal bleeding, confusion or disorientation, changes in urination, light-headedness, headache, changes in vision, dyspnea, chest pain or palpitations. Labs demonstrated Na 116 and MELD 32; patient admitted for hyponatremia. Nephrology consulted, recommended fluid restriction , holding diuretics, and prednisode taper that started 11/23 (decreased from 40mg QD to 20mg Qd.) Patient transferred to [facility] for revaluation of liver disease. Of note, paracentesis performed 11/22, removed 2L, negative for SBP.     Follows with Dr. Souza for cirrhosis. Since early 09/2017, patient's liver failure progressing indicated by worsening clinical signs (abdominal distention, weakness/fatigue) and MELD. In mid-October, patient's PETH demonstrated EtOH use and she was denied candidacy for liver transplant. She was then enrolled in in-patient substance abuse program at [facility] where she has remained since early November; per patient, her last EtOH beverage was 11/01/2017. Denies history of GI bleed, SBP, renal disease, or other  complications of cirrhosis. Liver biopsy 10/24 demonstrated stage 4 cirrhosis. Records reports EGD in August 2016 that showed small esophageal varices with severe gastritis. Colonoscopy 2015 with 2 polyps and internal hemorrhoids Started on prednisone 40mg QD on 10/25; was on spironolactone, has been held since 11/10 for hypoNa; on lactulose.     Hospital Course:  Mental status improving with hepatic encephalopathy treatment. Started empiric ceftriaxone given leukocytosis in immunocompromised state. CMV PCR positive, for which ID was consulted, recommended repeating titers in 1 week. Seen by Psychiatry and  who determined she was moderate risk for Liver transplant. EGD/Cscope and mammogram ordered. Colonoscopy not performed due to poor prep, EGD revealed large varices which were banded. IR paracentesis ordered after patient started complaining of abdominal pain. Mammogram performed, IR paracentesis after INR Reversal negative for SBP. Patient accepted for Liver transplant now. Listed with MELD 34. Patient was slightly confused and altered, which resolved with lactulose enemas. As pt with low risk malignancy we will not proceed with a breast biopsy.        Hospital Course:  Interval history: no acute events overnight. Sleepy and slightly more encephalopathic this am. With 4-5 BMs yesterday. Cont with lactulose for HE control and do not skip doses as pt prone to HE. Remains listed for liver transplant with a MELD of 35. Pt remains stable and ready for transplant. Recent paracentesis negative for SBP. Daily assessment for paracentesis. Monitor.     Scheduled Meds:   albumin human 25%  25 g Intravenous Q8H    custom IVPB builder  1 g Intravenous Q24H    lactulose  30 g Oral TID    pantoprazole  40 mg Oral Daily    pramoxine-hydrocortisone   Rectal BID    rifAXImin  550 mg Oral BID    zinc sulfate  220 mg Oral BID     Continuous Infusions:  PRN Meds:GI cocktail (mylanta 30 mL, lidocaine 2 % viscous 10  mL, dicyclomine 10 mL) 50 mL, ondansetron, ondansetron, simethicone, sodium chloride 0.9%, sodium chloride 0.9%, sodium chloride 0.9%    Review of Systems   Constitutional: Positive for activity change, appetite change and fatigue. Negative for fever.   HENT: Negative.  Negative for congestion and facial swelling.    Eyes: Negative.    Respiratory: Negative.  Negative for chest tightness, shortness of breath and wheezing.    Cardiovascular: Negative.  Negative for chest pain, palpitations and leg swelling.   Gastrointestinal: Positive for abdominal distention and abdominal pain. Negative for constipation, diarrhea, nausea and vomiting.   Genitourinary: Negative.  Negative for decreased urine volume, difficulty urinating, dysuria, hematuria and urgency.   Musculoskeletal: Negative.  Negative for back pain, neck pain and neck stiffness.   Skin: Negative.  Negative for color change, pallor and rash.   Neurological: Positive for weakness. Negative for dizziness, seizures, speech difficulty, light-headedness and headaches.   Psychiatric/Behavioral: Positive for confusion and sleep disturbance. Negative for behavioral problems, hallucinations and suicidal ideas. The patient is nervous/anxious.      Objective:     Vital Signs (Most Recent):  Temp: 98.4 °F (36.9 °C) (12/24/17 0725)  Pulse: 91 (12/24/17 0725)  Resp: 14 (12/24/17 0725)  BP: (!) 113/52 (12/24/17 0725)  SpO2: 100 % (12/24/17 0725) Vital Signs (24h Range):  Temp:  [97.7 °F (36.5 °C)-98.4 °F (36.9 °C)] 98.4 °F (36.9 °C)  Pulse:  [91-95] 91  Resp:  [14-18] 14  SpO2:  [99 %-100 %] 100 %  BP: ()/(47-57) 113/52     Weight: 53.3 kg (117 lb 8.1 oz)  Body mass index is 19.55 kg/m².    Intake/Output - Last 3 Shifts       12/22 0700 - 12/23 0659 12/23 0700 - 12/24 0659 12/24 0700 - 12/25 0659    P.O. 50 320     I.V. (mL/kg)       IV Piggyback  50 0    Total Intake(mL/kg) 50 (0.8) 370 (6.9) 0 (0)    Urine (mL/kg/hr)  0 (0)     Emesis/NG output  0 (0)     Stool  0 (0)      Total Output   0      Net +50 +370 0           Urine Occurrence  3 x     Stool Occurrence 2 x 9 x     Emesis Occurrence  0 x           Physical Exam   Constitutional: She is oriented to person, place, and time. She appears well-developed and well-nourished. No distress.   HENT:   Head: Normocephalic and atraumatic.   Eyes: Scleral icterus is present.   Neck: Normal range of motion. Neck supple.   Cardiovascular: Normal rate, regular rhythm and normal heart sounds.    No murmur heard.  Pulmonary/Chest: Effort normal. No respiratory distress. She has decreased breath sounds in the right lower field and the left lower field. She has no wheezes. She exhibits no tenderness.   Abdominal: Soft. Bowel sounds are normal. She exhibits distension. There is tenderness.   Musculoskeletal: Normal range of motion. She exhibits no edema or tenderness.   Neurological: She is alert and oriented to person, place, and time.   Skin: Skin is warm and dry. She is not diaphoretic.   Psychiatric: She has a normal mood and affect. Her behavior is normal. Judgment and thought content normal.   Nursing note and vitals reviewed.      Laboratory:  Immunosuppressants     None        CBC:   Recent Labs  Lab 12/24/17 0454   WBC 10.23   RBC 2.91*   HGB 9.0*   HCT 25.0*   *   MCV 86   MCH 30.9   MCHC 36.0     CMP:   Recent Labs  Lab 12/24/17 0455   GLU 87   CALCIUM 8.7   ALBUMIN 2.6*   PROT 6.2   *   K 4.0   CO2 20*   CL 95   BUN 17   CREATININE 1.1   ALKPHOS 293*   ALT 48*   AST 93*   BILITOT 24.0*     Coagulation:   Recent Labs  Lab 12/24/17 0455   INR 2.8*     Labs within the past 24 hours have been reviewed.    Diagnostic Results:  I have personally reviewed all pertinent imaging studies.    Assessment/Plan:     * Alcoholic hepatitis with ascites- decompensated with elevated MELD    MELD-Na score: 35 at 12/24/2017  4:55 AM  MELD score: 31 at 12/24/2017  4:55 AM  Calculated from:  Serum Creatinine: 1.1 mg/dL at 12/24/2017  4:55  AM  Serum Sodium: 124 mmol/L (Rounded to 125) at 12/24/2017  4:55 AM  Total Bilirubin: 24.0 mg/dL at 12/24/2017  4:55 AM  INR(ratio): 2.8 at 12/24/2017  4:55 AM  Age: 52 years    Age: 52 years   - had half of her evaluation completed on last admission  - AS per Psychiatry patient is moderate risk.   - EGD performed, esophageal varices banded,   - Mammogram performed on 12/4/17   - C-scope repeated on 12/5- good prep  - accepted for liver transplant and now listed with a MELD of 35.    - IR paracentesis on 12/19- 4.6 L removed and negative for SBP  - Pt stable and ready for transplant.  - Cont with lactulose as important for pt to have 4-5 BMs per day. Slightly more encephalopathic today.           Decompensated hepatic cirrhosis    -           Ascites    - Last paracentesis on 12/19 with 4.6L off.   - Fluid neg for infection.           Hepatic encephalopathy    Now more alert and oriented vs yesterday. Cont with lactulose for HE control. Titrate to 4-5 BMs.           Anemia of chronic disease    - H&H stable. Monitor.           Hypomagnesemia    - Stable. Monitor.           Severe protein-calorie malnutrition    - Encourage PO intake for nutrition.           Hyponatremia    - with worsening hyponatremia levels. Albumin given today x2.   - Daily lab work to monitor closely.          Cytomegalovirus (CMV) viremia    - CMV PCR noted with 501 on 11/30.   - Repeat CMV PCR pending.           Alcoholic cirrhosis of liver with ascites    - Pt with ESLD. Now listed for liver transplant.   - Daily assessment of ascites for paracentesis.           Coagulopathy    - INR remains elevated as a component of liver disease.   - no s/s of bleeding at this time.           Immunosuppressed status    Previously on chronic prednisone for alcoholic hepatitis; Prednisone has been weaned off, last dose of 5mg of prednisone was on 12/15              VTE Risk Mitigation         Ordered     Place sequential compression device  Until  discontinued      11/24/17 0734     Place FABIOLA hose  Until discontinued      11/23/17 2305     Medium Risk of VTE  Once      11/23/17 2305          The patients clinical status was discussed at multidisplinary rounds, involving transplant surgery, transplant medicine, pharmacy, nursing, nutrition, and social work    Discharge Planning:  Not a candidate for d/c at this time.     Devon Robles NP  Liver Transplant  [facility]

## 2017-12-24 NOTE — ASSESSMENT & PLAN NOTE
MELD-Na score: 35 at 12/24/2017  4:55 AM  MELD score: 31 at 12/24/2017  4:55 AM  Calculated from:  Serum Creatinine: 1.1 mg/dL at 12/24/2017  4:55 AM  Serum Sodium: 124 mmol/L (Rounded to 125) at 12/24/2017  4:55 AM  Total Bilirubin: 24.0 mg/dL at 12/24/2017  4:55 AM  INR(ratio): 2.8 at 12/24/2017  4:55 AM  Age: 52 years    Age: 52 years   - had half of her evaluation completed on last admission  - AS per Psychiatry patient is moderate risk.   - EGD performed, esophageal varices banded,   - Mammogram performed on 12/4/17   - C-scope repeated on 12/5- good prep  - accepted for liver transplant and now listed with a MELD of 35.    - IR paracentesis on 12/19- 4.6 L removed and negative for SBP  - Pt stable and ready for transplant.  - Cont with lactulose as important for pt to have 4-5 BMs per day. Slightly more encephalopathic today.

## 2017-12-24 NOTE — SUBJECTIVE & OBJECTIVE
Scheduled Meds:   albumin human 25%  25 g Intravenous Q8H    custom IVPB builder  1 g Intravenous Q24H    lactulose  30 g Oral TID    pantoprazole  40 mg Oral Daily    pramoxine-hydrocortisone   Rectal BID    rifAXImin  550 mg Oral BID    zinc sulfate  220 mg Oral BID     Continuous Infusions:  PRN Meds:GI cocktail (mylanta 30 mL, lidocaine 2 % viscous 10 mL, dicyclomine 10 mL) 50 mL, ondansetron, ondansetron, simethicone, sodium chloride 0.9%, sodium chloride 0.9%, sodium chloride 0.9%    Review of Systems   Constitutional: Positive for activity change, appetite change and fatigue. Negative for fever.   HENT: Negative.  Negative for congestion and facial swelling.    Eyes: Negative.    Respiratory: Negative.  Negative for chest tightness, shortness of breath and wheezing.    Cardiovascular: Negative.  Negative for chest pain, palpitations and leg swelling.   Gastrointestinal: Positive for abdominal distention and abdominal pain. Negative for constipation, diarrhea, nausea and vomiting.   Genitourinary: Negative.  Negative for decreased urine volume, difficulty urinating, dysuria, hematuria and urgency.   Musculoskeletal: Negative.  Negative for back pain, neck pain and neck stiffness.   Skin: Negative.  Negative for color change, pallor and rash.   Neurological: Positive for weakness. Negative for dizziness, seizures, speech difficulty, light-headedness and headaches.   Psychiatric/Behavioral: Positive for confusion and sleep disturbance. Negative for behavioral problems, hallucinations and suicidal ideas. The patient is nervous/anxious.      Objective:     Vital Signs (Most Recent):  Temp: 98.4 °F (36.9 °C) (12/24/17 0725)  Pulse: 91 (12/24/17 0725)  Resp: 14 (12/24/17 0725)  BP: (!) 113/52 (12/24/17 0725)  SpO2: 100 % (12/24/17 0725) Vital Signs (24h Range):  Temp:  [97.7 °F (36.5 °C)-98.4 °F (36.9 °C)] 98.4 °F (36.9 °C)  Pulse:  [91-95] 91  Resp:  [14-18] 14  SpO2:  [99 %-100 %] 100 %  BP:  ()/(47-57) 113/52     Weight: 53.3 kg (117 lb 8.1 oz)  Body mass index is 19.55 kg/m².    Intake/Output - Last 3 Shifts       12/22 0700 - 12/23 0659 12/23 0700 - 12/24 0659 12/24 0700 - 12/25 0659    P.O. 50 320     I.V. (mL/kg)       IV Piggyback  50 0    Total Intake(mL/kg) 50 (0.8) 370 (6.9) 0 (0)    Urine (mL/kg/hr)  0 (0)     Emesis/NG output  0 (0)     Stool  0 (0)     Total Output   0      Net +50 +370 0           Urine Occurrence  3 x     Stool Occurrence 2 x 9 x     Emesis Occurrence  0 x           Physical Exam   Constitutional: She is oriented to person, place, and time. She appears well-developed and well-nourished. No distress.   HENT:   Head: Normocephalic and atraumatic.   Eyes: Scleral icterus is present.   Neck: Normal range of motion. Neck supple.   Cardiovascular: Normal rate, regular rhythm and normal heart sounds.    No murmur heard.  Pulmonary/Chest: Effort normal. No respiratory distress. She has decreased breath sounds in the right lower field and the left lower field. She has no wheezes. She exhibits no tenderness.   Abdominal: Soft. Bowel sounds are normal. She exhibits distension. There is tenderness.   Musculoskeletal: Normal range of motion. She exhibits no edema or tenderness.   Neurological: She is alert and oriented to person, place, and time.   Skin: Skin is warm and dry. She is not diaphoretic.   Psychiatric: She has a normal mood and affect. Her behavior is normal. Judgment and thought content normal.   Nursing note and vitals reviewed.      Laboratory:  Immunosuppressants     None        CBC:   Recent Labs  Lab 12/24/17 0454   WBC 10.23   RBC 2.91*   HGB 9.0*   HCT 25.0*   *   MCV 86   MCH 30.9   MCHC 36.0     CMP:   Recent Labs  Lab 12/24/17 0455   GLU 87   CALCIUM 8.7   ALBUMIN 2.6*   PROT 6.2   *   K 4.0   CO2 20*   CL 95   BUN 17   CREATININE 1.1   ALKPHOS 293*   ALT 48*   AST 93*   BILITOT 24.0*     Coagulation:   Recent Labs  Lab 12/24/17 0455   INR  2.8*     Labs within the past 24 hours have been reviewed.    Diagnostic Results:  I have personally reviewed all pertinent imaging studies.

## 2017-12-24 NOTE — PLAN OF CARE
Problem: Patient Care Overview  Goal: Plan of Care Review  Outcome: Ongoing (interventions implemented as appropriate)  POC reviewed with pt, acknowledged understanding. Pt remains free from falls/injuries, VSS. Quiet. AAOx4. No complaints of pain. Incontinent of urine and stool. 2 BM since 0000. Independently turning in bed. WCTM.

## 2017-12-25 ENCOUNTER — ANESTHESIA EVENT (OUTPATIENT)
Dept: SURGERY | Facility: HOSPITAL | Age: 52
DRG: 005 | End: 2017-12-25
Payer: COMMERCIAL

## 2017-12-25 ENCOUNTER — TELEPHONE (OUTPATIENT)
Dept: TRANSPLANT | Facility: CLINIC | Age: 52
End: 2017-12-25

## 2017-12-25 ENCOUNTER — ANESTHESIA (OUTPATIENT)
Dept: SURGERY | Facility: HOSPITAL | Age: 52
DRG: 005 | End: 2017-12-25
Payer: COMMERCIAL

## 2017-12-25 LAB
ABO + RH BLD: NORMAL
ALBUMIN SERPL BCP-MCNC: 3 G/DL
ALBUMIN SERPL BCP-MCNC: 3.4 G/DL
ALP SERPL-CCNC: 231 U/L
ALP SERPL-CCNC: 237 U/L
ALT SERPL W/O P-5'-P-CCNC: 38 U/L
ALT SERPL W/O P-5'-P-CCNC: 41 U/L
ANION GAP SERPL CALC-SCNC: 10 MMOL/L
ANION GAP SERPL CALC-SCNC: 10 MMOL/L
ANION GAP SERPL CALC-SCNC: 11 MMOL/L
ANION GAP SERPL CALC-SCNC: 9 MMOL/L
APTT BLDCRRT: 68.8 SEC
AST SERPL-CCNC: 70 U/L
AST SERPL-CCNC: 78 U/L
BACTERIA #/AREA URNS AUTO: ABNORMAL /HPF
BASOPHILS # BLD AUTO: 0.04 K/UL
BASOPHILS # BLD AUTO: 0.06 K/UL
BASOPHILS NFR BLD: 0.5 %
BASOPHILS NFR BLD: 0.7 %
BILIRUB DIRECT SERPL-MCNC: 10.9 MG/DL
BILIRUB SERPL-MCNC: 21.7 MG/DL
BILIRUB SERPL-MCNC: 22.6 MG/DL
BILIRUB UR QL STRIP: NEGATIVE
BLD GP AB SCN CELLS X3 SERPL QL: NORMAL
BLD PROD TYP BPU: NORMAL
BLOOD UNIT EXPIRATION DATE: NORMAL
BLOOD UNIT TYPE CODE: 600
BLOOD UNIT TYPE: NORMAL
BUN SERPL-MCNC: 14 MG/DL
BUN SERPL-MCNC: 16 MG/DL
CALCIUM SERPL-MCNC: 8.8 MG/DL
CALCIUM SERPL-MCNC: 9 MG/DL
CALCIUM SERPL-MCNC: 9.1 MG/DL
CALCIUM SERPL-MCNC: 9.1 MG/DL
CHLORIDE SERPL-SCNC: 95 MMOL/L
CHLORIDE SERPL-SCNC: 96 MMOL/L
CHLORIDE SERPL-SCNC: 97 MMOL/L
CHLORIDE SERPL-SCNC: 97 MMOL/L
CLARITY UR REFRACT.AUTO: ABNORMAL
CO2 SERPL-SCNC: 20 MMOL/L
CO2 SERPL-SCNC: 21 MMOL/L
CODING SYSTEM: NORMAL
COLOR UR AUTO: ABNORMAL
CREAT SERPL-MCNC: 0.8 MG/DL
CREAT SERPL-MCNC: 1 MG/DL
CREAT SERPL-MCNC: 1.2 MG/DL
CREAT SERPL-MCNC: 1.2 MG/DL
DIFFERENTIAL METHOD: ABNORMAL
DIFFERENTIAL METHOD: ABNORMAL
DISPENSE STATUS: NORMAL
EOSINOPHIL # BLD AUTO: 0 K/UL
EOSINOPHIL # BLD AUTO: 0 K/UL
EOSINOPHIL NFR BLD: 0.4 %
EOSINOPHIL NFR BLD: 0.4 %
ERYTHROCYTE [DISTWIDTH] IN BLOOD BY AUTOMATED COUNT: 15.8 %
ERYTHROCYTE [DISTWIDTH] IN BLOOD BY AUTOMATED COUNT: 15.9 %
EST. GFR  (AFRICAN AMERICAN): 59.2 ML/MIN/1.73 M^2
EST. GFR  (AFRICAN AMERICAN): 59.2 ML/MIN/1.73 M^2
EST. GFR  (AFRICAN AMERICAN): >60 ML/MIN/1.73 M^2
EST. GFR  (AFRICAN AMERICAN): >60 ML/MIN/1.73 M^2
EST. GFR  (NON AFRICAN AMERICAN): 51.4 ML/MIN/1.73 M^2
EST. GFR  (NON AFRICAN AMERICAN): 51.4 ML/MIN/1.73 M^2
EST. GFR  (NON AFRICAN AMERICAN): >60 ML/MIN/1.73 M^2
EST. GFR  (NON AFRICAN AMERICAN): >60 ML/MIN/1.73 M^2
FIBRINOGEN PPP-MCNC: 110 MG/DL
GLUCOSE SERPL-MCNC: 85 MG/DL
GLUCOSE SERPL-MCNC: 97 MG/DL
GLUCOSE SERPL-MCNC: 99 MG/DL
GLUCOSE SERPL-MCNC: 99 MG/DL
GLUCOSE UR QL STRIP: NEGATIVE
GRAN CASTS UR QL COMP ASSIST: 6 /LPF
HCT VFR BLD AUTO: 21.3 %
HCT VFR BLD AUTO: 22.9 %
HGB BLD-MCNC: 7.4 G/DL
HGB BLD-MCNC: 8 G/DL
HGB UR QL STRIP: NEGATIVE
HYALINE CASTS UR QL AUTO: 8 /LPF
IMM GRANULOCYTES # BLD AUTO: 0.02 K/UL
IMM GRANULOCYTES # BLD AUTO: 0.02 K/UL
IMM GRANULOCYTES NFR BLD AUTO: 0.2 %
IMM GRANULOCYTES NFR BLD AUTO: 0.3 %
INR PPP: 3
INR PPP: 3.3
KETONES UR QL STRIP: NEGATIVE
LEUKOCYTE ESTERASE UR QL STRIP: NEGATIVE
LYMPHOCYTES # BLD AUTO: 2.2 K/UL
LYMPHOCYTES # BLD AUTO: 2.3 K/UL
LYMPHOCYTES NFR BLD: 28.6 %
LYMPHOCYTES NFR BLD: 28.9 %
MAGNESIUM SERPL-MCNC: 1.8 MG/DL
MAGNESIUM SERPL-MCNC: 1.8 MG/DL
MCH RBC QN AUTO: 30.3 PG
MCH RBC QN AUTO: 30.6 PG
MCHC RBC AUTO-ENTMCNC: 34.7 G/DL
MCHC RBC AUTO-ENTMCNC: 34.9 G/DL
MCV RBC AUTO: 87 FL
MCV RBC AUTO: 88 FL
MICROSCOPIC COMMENT: ABNORMAL
MONOCYTES # BLD AUTO: 1 K/UL
MONOCYTES # BLD AUTO: 1 K/UL
MONOCYTES NFR BLD: 12.5 %
MONOCYTES NFR BLD: 12.5 %
NEUTROPHILS # BLD AUTO: 4.3 K/UL
NEUTROPHILS # BLD AUTO: 4.7 K/UL
NEUTROPHILS NFR BLD: 57.4 %
NEUTROPHILS NFR BLD: 57.6 %
NITRITE UR QL STRIP: NEGATIVE
NRBC BLD-RTO: 0 /100 WBC
NRBC BLD-RTO: 0 /100 WBC
PH UR STRIP: 5 [PH] (ref 5–8)
PHOSPHATE SERPL-MCNC: 3 MG/DL
PLATELET # BLD AUTO: 89 K/UL
PLATELET # BLD AUTO: 96 K/UL
PMV BLD AUTO: 10.1 FL
PMV BLD AUTO: 10.3 FL
POTASSIUM SERPL-SCNC: 2.6 MMOL/L
POTASSIUM SERPL-SCNC: 2.6 MMOL/L
POTASSIUM SERPL-SCNC: 2.8 MMOL/L
POTASSIUM SERPL-SCNC: 3.2 MMOL/L
PROT SERPL-MCNC: 6.1 G/DL
PROT SERPL-MCNC: 6.2 G/DL
PROT UR QL STRIP: NEGATIVE
PROTHROMBIN TIME: 30.2 SEC
PROTHROMBIN TIME: 32.8 SEC
RBC # BLD AUTO: 2.42 M/UL
RBC # BLD AUTO: 2.64 M/UL
RBC #/AREA URNS AUTO: 2 /HPF (ref 0–4)
SODIUM SERPL-SCNC: 125 MMOL/L
SODIUM SERPL-SCNC: 127 MMOL/L
SODIUM SERPL-SCNC: 128 MMOL/L
SODIUM SERPL-SCNC: 128 MMOL/L
SP GR UR STRIP: 1.01 (ref 1–1.03)
TRANS ERYTHROCYTES VOL PATIENT: NORMAL ML
URN SPEC COLLECT METH UR: ABNORMAL
UROBILINOGEN UR STRIP-ACNC: NEGATIVE EU/DL
WBC # BLD AUTO: 7.57 K/UL
WBC # BLD AUTO: 8.17 K/UL
WBC #/AREA URNS AUTO: 1 /HPF (ref 0–5)
WBC CASTS UR QL COMP ASSIST: 3 /LPF

## 2017-12-25 PROCEDURE — 93010 ELECTROCARDIOGRAM REPORT: CPT | Mod: ,,, | Performed by: INTERNAL MEDICINE

## 2017-12-25 PROCEDURE — 83735 ASSAY OF MAGNESIUM: CPT | Mod: 91,NTX

## 2017-12-25 PROCEDURE — 83036 HEMOGLOBIN GLYCOSYLATED A1C: CPT | Mod: NTX

## 2017-12-25 PROCEDURE — 25000003 PHARM REV CODE 250: Mod: NTX | Performed by: INTERNAL MEDICINE

## 2017-12-25 PROCEDURE — 99233 SBSQ HOSP IP/OBS HIGH 50: CPT | Mod: NTX,,, | Performed by: INTERNAL MEDICINE

## 2017-12-25 PROCEDURE — 81001 URINALYSIS AUTO W/SCOPE: CPT | Mod: NTX

## 2017-12-25 PROCEDURE — 80053 COMPREHEN METABOLIC PANEL: CPT | Mod: NTX

## 2017-12-25 PROCEDURE — 85730 THROMBOPLASTIN TIME PARTIAL: CPT | Mod: NTX

## 2017-12-25 PROCEDURE — 87086 URINE CULTURE/COLONY COUNT: CPT | Mod: NTX

## 2017-12-25 PROCEDURE — 82248 BILIRUBIN DIRECT: CPT | Mod: NTX

## 2017-12-25 PROCEDURE — 25000003 PHARM REV CODE 250: Mod: NTX | Performed by: NURSE PRACTITIONER

## 2017-12-25 PROCEDURE — 80053 COMPREHEN METABOLIC PANEL: CPT | Mod: 91,NTX

## 2017-12-25 PROCEDURE — 85384 FIBRINOGEN ACTIVITY: CPT | Mod: NTX

## 2017-12-25 PROCEDURE — 80048 BASIC METABOLIC PNL TOTAL CA: CPT | Mod: NTX

## 2017-12-25 PROCEDURE — P9047 ALBUMIN (HUMAN), 25%, 50ML: HCPCS | Mod: NTX | Performed by: INTERNAL MEDICINE

## 2017-12-25 PROCEDURE — 85025 COMPLETE CBC W/AUTO DIFF WBC: CPT | Mod: 91,NTX

## 2017-12-25 PROCEDURE — 20600001 HC STEP DOWN PRIVATE ROOM: Mod: NTX

## 2017-12-25 PROCEDURE — 63600175 PHARM REV CODE 636 W HCPCS: Mod: NTX | Performed by: STUDENT IN AN ORGANIZED HEALTH CARE EDUCATION/TRAINING PROGRAM

## 2017-12-25 PROCEDURE — 63600175 PHARM REV CODE 636 W HCPCS: Mod: NTX | Performed by: INTERNAL MEDICINE

## 2017-12-25 PROCEDURE — 86920 COMPATIBILITY TEST SPIN: CPT | Mod: NTX

## 2017-12-25 PROCEDURE — 25000003 PHARM REV CODE 250: Mod: NTX | Performed by: HOSPITALIST

## 2017-12-25 PROCEDURE — 83735 ASSAY OF MAGNESIUM: CPT | Mod: NTX

## 2017-12-25 PROCEDURE — 25000003 PHARM REV CODE 250: Mod: NTX | Performed by: STUDENT IN AN ORGANIZED HEALTH CARE EDUCATION/TRAINING PROGRAM

## 2017-12-25 PROCEDURE — 85610 PROTHROMBIN TIME: CPT | Mod: 91,NTX

## 2017-12-25 PROCEDURE — 93005 ELECTROCARDIOGRAM TRACING: CPT | Mod: NTX

## 2017-12-25 PROCEDURE — P9021 RED BLOOD CELLS UNIT: HCPCS | Mod: NTX

## 2017-12-25 PROCEDURE — 85610 PROTHROMBIN TIME: CPT | Mod: NTX

## 2017-12-25 PROCEDURE — 86901 BLOOD TYPING SEROLOGIC RH(D): CPT | Mod: NTX

## 2017-12-25 PROCEDURE — 36415 COLL VENOUS BLD VENIPUNCTURE: CPT | Mod: NTX

## 2017-12-25 PROCEDURE — 63600175 PHARM REV CODE 636 W HCPCS: Mod: NTX | Performed by: NURSE PRACTITIONER

## 2017-12-25 PROCEDURE — 84100 ASSAY OF PHOSPHORUS: CPT | Mod: NTX

## 2017-12-25 RX ORDER — FUROSEMIDE 10 MG/ML
20 INJECTION INTRAMUSCULAR; INTRAVENOUS ONCE
Status: COMPLETED | OUTPATIENT
Start: 2017-12-25 | End: 2017-12-25

## 2017-12-25 RX ORDER — POTASSIUM CHLORIDE 7.45 MG/ML
10 INJECTION INTRAVENOUS
Status: DISCONTINUED | OUTPATIENT
Start: 2017-12-25 | End: 2017-12-25

## 2017-12-25 RX ORDER — MUPIROCIN 20 MG/G
OINTMENT TOPICAL
Status: DISCONTINUED | OUTPATIENT
Start: 2017-12-25 | End: 2017-12-26

## 2017-12-25 RX ORDER — ALBUMIN HUMAN 250 G/1000ML
50 SOLUTION INTRAVENOUS ONCE
Status: COMPLETED | OUTPATIENT
Start: 2017-12-25 | End: 2017-12-25

## 2017-12-25 RX ORDER — HYDROCODONE BITARTRATE AND ACETAMINOPHEN 500; 5 MG/1; MG/1
TABLET ORAL
Status: DISCONTINUED | OUTPATIENT
Start: 2017-12-25 | End: 2017-12-26

## 2017-12-25 RX ORDER — POTASSIUM CHLORIDE 20 MEQ/1
40 TABLET, EXTENDED RELEASE ORAL ONCE
Status: COMPLETED | OUTPATIENT
Start: 2017-12-25 | End: 2017-12-25

## 2017-12-25 RX ORDER — POTASSIUM CHLORIDE 7.45 MG/ML
10 INJECTION INTRAVENOUS ONCE
Status: COMPLETED | OUTPATIENT
Start: 2017-12-25 | End: 2017-12-25

## 2017-12-25 RX ORDER — METHYLPREDNISOLONE SODIUM SUCCINATE 500 MG/8ML
500 INJECTION INTRAMUSCULAR; INTRAVENOUS ONCE
Status: CANCELLED | OUTPATIENT
Start: 2017-12-25 | End: 2017-12-25

## 2017-12-25 RX ORDER — POTASSIUM CHLORIDE 20 MEQ/1
40 TABLET, EXTENDED RELEASE ORAL ONCE
Status: COMPLETED | OUTPATIENT
Start: 2017-12-26 | End: 2017-12-26

## 2017-12-25 RX ADMIN — PANTOPRAZOLE SODIUM 40 MG: 40 TABLET, DELAYED RELEASE ORAL at 09:12

## 2017-12-25 RX ADMIN — LACTULOSE 30 G: 20 SOLUTION ORAL at 05:12

## 2017-12-25 RX ADMIN — POTASSIUM CHLORIDE 10 MEQ: 10 INJECTION, SOLUTION INTRAVENOUS at 10:12

## 2017-12-25 RX ADMIN — POTASSIUM CHLORIDE 40 MEQ: 1500 TABLET, EXTENDED RELEASE ORAL at 01:12

## 2017-12-25 RX ADMIN — HYDROCORTISONE ACETATE AND PRAMOXINE HYDROCHLORIDE: 10; 10 CREAM TOPICAL at 09:12

## 2017-12-25 RX ADMIN — POTASSIUM CHLORIDE 10 MEQ: 10 INJECTION, SOLUTION INTRAVENOUS at 06:12

## 2017-12-25 RX ADMIN — Medication 220 MG: at 09:12

## 2017-12-25 RX ADMIN — POTASSIUM CHLORIDE 10 MEQ: 10 INJECTION, SOLUTION INTRAVENOUS at 09:12

## 2017-12-25 RX ADMIN — HYDROCORTISONE ACETATE AND PRAMOXINE HYDROCHLORIDE: 10; 10 CREAM TOPICAL at 08:12

## 2017-12-25 RX ADMIN — CEFTRIAXONE SODIUM 1 G: 1 INJECTION, POWDER, FOR SOLUTION INTRAMUSCULAR; INTRAVENOUS at 05:12

## 2017-12-25 RX ADMIN — VANCOMYCIN HYDROCHLORIDE 750 MG: 750 INJECTION, POWDER, LYOPHILIZED, FOR SOLUTION INTRAVENOUS at 01:12

## 2017-12-25 RX ADMIN — ALBUMIN (HUMAN) 50 G: 12.5 SOLUTION INTRAVENOUS at 08:12

## 2017-12-25 RX ADMIN — RIFAXIMIN 550 MG: 550 TABLET ORAL at 09:12

## 2017-12-25 RX ADMIN — FUROSEMIDE 20 MG: 10 INJECTION, SOLUTION INTRAVENOUS at 09:12

## 2017-12-25 RX ADMIN — POTASSIUM CHLORIDE 40 MEQ: 1500 TABLET, EXTENDED RELEASE ORAL at 09:12

## 2017-12-25 NOTE — PLAN OF CARE
Problem: Patient Care Overview  Goal: Plan of Care Review  Outcome: Ongoing (interventions implemented as appropriate)  Pt resting in bed, AO x 3, flat affect, withdrawn but alert when Rn enters room.  Bms x 2 , incontinent of stool and urine.  Pt to shower with FWW and assist today.  Iv antibiotics and Albumin, fluid restrictions of 500cc/daily , 300 of ice and water given this shift, minimal PO intake from meals, frequent weight shift and increased activity encouraged. Pt denies pain or nausea, safety measures maintained, call light within reach.     Problem: Fall Risk (Adult)  Intervention: Monitor/Assist with Self Care   12/24/17 1818   Activity   Activity Assistance Provided assistance, 1 person   Daily Care Interventions   Self-Care Promotion independence encouraged;BADL personal objects within reach   Functional Level Current   Ambulation 3 - assistive equipment and person   Transferring 1 - assistive equipment   Toileting 4 - completely dependent   Bathing 4 - completely dependent   Dressing 2 - assistive person   Communication 0 - understands/communicates without difficulty   Swallowing 0 - swallows foods/liquids without difficulty     Intervention: Review Medications/Identify Contributors to Fall Risk   12/24/17 1818   Safety Interventions   Medication Review/Management medications reviewed     Intervention: Patient Rounds   12/24/17 1818   Safety Interventions   Patient Rounds bed in low position;bed wheels locked;call light in reach;clutter free environment maintained;ID band on;placement of personal items at bedside;toileting offered;visualized patient         Problem: Pressure Ulcer Risk (Clinton Scale) (Adult,Obstetrics,Pediatric)  Intervention: Turn/Reposition Often   12/24/17 1818   Skin Interventions   Pressure Reduction Techniques frequent weight shift encouraged;weight shift assistance provided

## 2017-12-25 NOTE — ASSESSMENT & PLAN NOTE
MELD-Na score: 34 at 12/25/2017  3:58 AM  MELD score: 30 at 12/25/2017  3:58 AM  Calculated from:  Serum Creatinine: 0.8 mg/dL (Rounded to 1) at 12/25/2017  3:58 AM  Serum Sodium: 125 mmol/L at 12/25/2017  3:58 AM  Total Bilirubin: 21.7 mg/dL at 12/25/2017  3:58 AM  INR(ratio): 3.0 at 12/25/2017  3:58 AM  Age: 52 years    Age: 52 years   - had half of her evaluation completed on last admission  - AS per Psychiatry patient is moderate risk.   - EGD performed, esophageal varices banded,   - Mammogram performed on 12/4/17   - C-scope repeated on 12/5- good prep  - accepted for liver transplant and now listed with a MELD of 35.    - IR paracentesis on 12/19- 4.6 L removed and negative for SBP  - Pt stable and ready for transplant.  - Cont with lactulose as important for pt to have 4-5 BMs per day. Slightly more encephalopathic today.

## 2017-12-25 NOTE — TELEPHONE ENCOUNTER
ORGAN OFFER NOTE    Notified by Oj Perez, , of liver offer with donor information.  Donor and recipient information read back and verified.  Spoke with Dr. Lund as patient is inpatient. Dr. Lund states he informed patient of liver offer.

## 2017-12-25 NOTE — ASSESSMENT & PLAN NOTE
- with worsening hyponatremia levels.  - fluid restriction 1000 cc per day, albumin infusion and diuretic  - repeat BMP at noon.  - Daily lab work to monitor closely.

## 2017-12-25 NOTE — CONSULTS
"RD received consult for "transplant."  RD services currently following pt however pt will be re-assessed s/p transplant.    Thanks,  UTE Sevilla, LDN  [id number]  "

## 2017-12-25 NOTE — PLAN OF CARE
Problem: Patient Care Overview  Goal: Plan of Care Review  Pt resting in bed, AAO x 3, flat affect and withdrawn. Pt had 2 BMs overnight, pt incontinent of bowel and urine. Fluid restriction of 500cc/daily, pt drank about 100ml during shift. No complaints of pain. Call bell and personal belongings within reach. Plan of care reviewed with patient, all questions and concerns were addressed. Will continue to monitor.

## 2017-12-25 NOTE — PROGRESS NOTES
[facility]  Liver Transplant  Progress Note    Patient Name: Marcie Bazan  MRN: [id number]  Admission Date: 11/23/2017  Hospital Length of Stay: 32 days  Code Status: Full Code  Primary Care Provider: ODILIA Wall    Subjective:     History of Present Illness:  HPI:  52 year old female with PMHx of 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 Patient presented to [facility] with generalized weakness. Onset two months ago, progressively worsening. Was admitted to  11/10 for hyponatremia seen on labs in GI office. Discharged on 11/15 and returned on 11/20 with 1 week worsening weakness, malaise, lethargy, and decreased appetite. Denied fever/chills, abdominal pain, bright red or black tarry stools, hematemesis or other abnormal bleeding, confusion or disorientation, changes in urination, light-headedness, headache, changes in vision, dyspnea, chest pain or palpitations. Labs demonstrated Na 116 and MELD 32; patient admitted for hyponatremia. Nephrology consulted, recommended fluid restriction , holding diuretics, and prednisode taper that started 11/23 (decreased from 40mg QD to 20mg Qd.) Patient transferred to [facility] for revaluation of liver disease. Of note, paracentesis performed 11/22, removed 2L, negative for SBP.     Follows with Dr. Souza for cirrhosis. Since early 09/2017, patient's liver failure progressing indicated by worsening clinical signs (abdominal distention, weakness/fatigue) and MELD. In mid-October, patient's PETH demonstrated EtOH use and she was denied candidacy for liver transplant. She was then enrolled in in-patient substance abuse program at [facility] where she has remained since early November; per patient, her last EtOH beverage was 11/01/2017. Denies history of GI bleed, SBP, renal disease, or other  complications of cirrhosis. Liver biopsy 10/24 demonstrated stage 4 cirrhosis. Records reports EGD in August 2016 that showed small esophageal varices with severe gastritis. Colonoscopy 2015 with 2 polyps and internal hemorrhoids Started on prednisone 40mg QD on 10/25; was on spironolactone, has been held since 11/10 for hypoNa; on lactulose.     Hospital Course:  Mental status improving with hepatic encephalopathy treatment. Started empiric ceftriaxone given leukocytosis in immunocompromised state. CMV PCR positive, for which ID was consulted, recommended repeating titers in 1 week. Seen by Psychiatry and  who determined she was moderate risk for Liver transplant. EGD/Cscope and mammogram ordered. Colonoscopy not performed due to poor prep, EGD revealed large varices which were banded. IR paracentesis ordered after patient started complaining of abdominal pain. Mammogram performed, IR paracentesis after INR Reversal negative for SBP.  As pt with low risk malignancy, it was decided not to proceed with a breast biopsy.  Patient accepted for Liver transplant. Listed with MELD 35. Patient was slightly confused and altered, which resolved with lactulose enemas.      Hospital Course:  Interval history: no acute events overnight. Oriented x 3 today. With 4-5 BMs yesterday. Cont with lactulose for HE control and do not skip doses as pt prone to HE. Remains listed for liver transplant with a MELD of 35. Na: 125, will give low dose diuretics, albumin and continue fluid restriction to improve Na. Pt remains stable and ready for transplant. Recent paracentesis negative for SBP. Daily assessment for paracentesis. Monitor.     Scheduled Meds:   custom IVPB builder  1 g Intravenous Q24H    lactulose  30 g Oral TID    pantoprazole  40 mg Oral Daily    pramoxine-hydrocortisone   Rectal BID    rifAXImin  550 mg Oral BID    vancomycin (VANCOCIN) IVPB  750 mg Intravenous Q24H    zinc sulfate  220 mg Oral BID      Continuous Infusions:  PRN Meds:sodium chloride, sodium chloride, sodium chloride, ampicillin-sulbactim (UNASYN) IVPB, GI cocktail (mylanta 30 mL, lidocaine 2 % viscous 10 mL, dicyclomine 10 mL) 50 mL, mupirocin, ondansetron, ondansetron, simethicone, sodium chloride 0.9%, sodium chloride 0.9%, sodium chloride 0.9%    Review of Systems   Constitutional: Positive for appetite change and fatigue. Negative for activity change, chills and fever.   HENT: Negative for congestion, ear discharge, ear pain and sinus pressure.    Respiratory: Negative for apnea, cough, chest tightness, shortness of breath and stridor.    Cardiovascular: Negative for chest pain, palpitations and leg swelling.   Gastrointestinal: Negative for abdominal distention, abdominal pain, anal bleeding, blood in stool, diarrhea, nausea and vomiting.   Endocrine: Negative for cold intolerance and heat intolerance.   Genitourinary: Negative for difficulty urinating, dyspareunia, dysuria, menstrual problem and urgency.   Musculoskeletal: Negative for arthralgias and back pain.   Skin: Negative for pallor.   Neurological: Negative for dizziness, weakness and headaches.   Hematological: Bruises/bleeds easily.   Psychiatric/Behavioral: Positive for decreased concentration and sleep disturbance. Negative for agitation, behavioral problems and confusion.     Objective:     Vital Signs (Most Recent):  Temp: 98 °F (36.7 °C) (12/25/17 0809)  Pulse: 89 (12/25/17 0809)  Resp: 16 (12/25/17 0809)  BP: 112/62 (12/25/17 0809)  SpO2: 100 % (12/25/17 0809) Vital Signs (24h Range):  Temp:  [97.6 °F (36.4 °C)-98.8 °F (37.1 °C)] 98 °F (36.7 °C)  Pulse:  [87-92] 89  Resp:  [16-19] 16  SpO2:  [100 %] 100 %  BP: (101-112)/(50-62) 112/62     Weight: 53.3 kg (117 lb 8.1 oz)  Body mass index is 19.55 kg/m².    Intake/Output - Last 3 Shifts       12/23 0700 - 12/24 0659 12/24 0700 - 12/25 0659 12/25 0700 - 12/26 0659    P.O. 320 100     IV Piggyback 50 50     Total  Intake(mL/kg) 370 (6.9) 150 (2.8)     Urine (mL/kg/hr) 0 (0)      Emesis/NG output 0 (0)      Stool 0 (0)      Total Output 0        Net +370 +150             Urine Occurrence 3 x 3 x     Stool Occurrence 9 x 4 x     Emesis Occurrence 0 x            Physical Exam   Constitutional: She is oriented to person, place, and time. She appears well-developed.   Chronically ill-appearing. Malnourished. Temporal wasting.     HENT:   Head: Normocephalic and atraumatic.   Mouth/Throat: Oropharynx is clear and moist. No oropharyngeal exudate.   Eyes: Conjunctivae are normal. Scleral icterus is present.   Neck: Normal range of motion.   Cardiovascular: Normal rate, regular rhythm and normal heart sounds.    No murmur heard.  Pulmonary/Chest: Effort normal and breath sounds normal. No respiratory distress. She has no wheezes. She has no rales.   Abdominal: Soft. Bowel sounds are normal. She exhibits distension. There is no tenderness. There is no guarding. No hernia.   Musculoskeletal: She exhibits no edema.   Lymphadenopathy:     She has no cervical adenopathy.   Neurological: She is alert and oriented to person, place, and time.   No asterixis.   Skin: Skin is warm and dry. No rash noted.   Palmar erythema noted. Scattered spider angiomata on upper chest     Psychiatric: She has a normal mood and affect. Her behavior is normal. Her mood appears not anxious.   Nursing note and vitals reviewed.      Laboratory:  Immunosuppressants     None        CBC:   Recent Labs  Lab 12/25/17 0358   WBC 8.17   RBC 2.64*   HGB 8.0*   HCT 22.9*   PLT 96*   MCV 87   MCH 30.3   MCHC 34.9     CMP:   Recent Labs  Lab 12/25/17 0358   GLU 85   CALCIUM 9.0   ALBUMIN 3.0*   PROT 6.1   *   K 3.2*   CO2 20*   CL 96   BUN 16   CREATININE 0.8   ALKPHOS 237*   ALT 41   AST 78*   BILITOT 21.7*     Coagulation:   Recent Labs  Lab 12/25/17 0358   INR 3.0*     Labs within the past 24 hours have been reviewed.    Diagnostic Results:  I have personally  reviewed all pertinent imaging studies.    Assessment/Plan:     * Alcoholic hepatitis with ascites- decompensated with elevated MELD    MELD-Na score: 34 at 12/25/2017  3:58 AM  MELD score: 30 at 12/25/2017  3:58 AM  Calculated from:  Serum Creatinine: 0.8 mg/dL (Rounded to 1) at 12/25/2017  3:58 AM  Serum Sodium: 125 mmol/L at 12/25/2017  3:58 AM  Total Bilirubin: 21.7 mg/dL at 12/25/2017  3:58 AM  INR(ratio): 3.0 at 12/25/2017  3:58 AM  Age: 52 years    Age: 52 years   - had half of her evaluation completed on last admission  - AS per Psychiatry patient is moderate risk.   - EGD performed, esophageal varices banded,   - Mammogram performed on 12/4/17   - C-scope repeated on 12/5- good prep  - accepted for liver transplant and now listed with a MELD of 35.    - IR paracentesis on 12/19- 4.6 L removed and negative for SBP  - Pt stable and ready for transplant.  - Cont with lactulose as important for pt to have 4-5 BMs per day. Slightly more encephalopathic today.           Hyponatremia    - with worsening hyponatremia levels.  - fluid restriction 1000 cc per day, albumin infusion and diuretic  - repeat BMP at noon.  - Daily lab work to monitor closely.          Anemia of chronic disease    - H&H stable. Monitor.           Immunosuppressed status    Previously on chronic prednisone for alcoholic hepatitis; Prednisone has been weaned off, last dose of 5mg of prednisone was on 12/15          Coagulopathy    - INR remains elevated as a component of liver disease.   - no s/s of bleeding at this time.           Alcoholic cirrhosis of liver with ascites    - Pt with ESLD. Now listed for liver transplant.   - Daily assessment of ascites for paracentesis.           Cytomegalovirus (CMV) viremia    - CMV PCR noted with 501 on 11/30.   - Repeat CMV PCR pending.           Hepatic encephalopathy    Now more alert and oriented vs yesterday. Cont with lactulose for HE control. Titrate to 4-5 BMs.           Severe protein-calorie  malnutrition    - Encourage PO intake for nutrition.           Hypomagnesemia    - Stable. Monitor.           Ascites    - Last paracentesis on 12/19 with 4.6L off.   - Fluid neg for infection.           Decompensated hepatic cirrhosis    -               VTE Risk Mitigation         Ordered     IP VTE HIGH RISK PATIENT  Once      12/25/17 1043     Send SCD stockings and FABIOLA hose with patient to OR  Continuous      12/25/17 1043     Place sequential compression device  Until discontinued      11/24/17 0734     Place FABIOLA hose  Until discontinued      11/23/17 2305          The patients clinical status was discussed at multidisplinary rounds, involving transplant surgery, transplant medicine, pharmacy, nursing, nutrition, and social work    Discharge Planning:  Not a candidate for d/c at this time.     Benji Lund MD  Liver Transplant  [facility]

## 2017-12-25 NOTE — PLAN OF CARE
Problem: Patient Care Overview  Goal: Plan of Care Review  Outcome: Ongoing (interventions implemented as appropriate)  Pt resting in bed, denies pain, NPO pending transplant.  Preop bath completed, labs collected, consents signed and in chart, Ben Barrera and Shilpi applied.  1 Unit RBCs completed this shift, pt voided and BM per flowsheet.  Safety measures maintained. Call light within reach.     Problem: Fall Risk (Adult)  Intervention: Review Medications/Identify Contributors to Fall Risk   12/25/17 1753   Safety Interventions   Medication Review/Management medications reviewed     Intervention: Patient Rounds   12/25/17 1753   Safety Interventions   Patient Rounds bed in low position;bed wheels locked;call light in reach;clutter free environment maintained;ID band on;placement of personal items at bedside;toileting offered;visualized patient     Intervention: Safety Promotion/Fall Prevention   12/25/17 1753   Safety Interventions   Safety Promotion/Fall Prevention assistive device/personal item within reach;side rails raised x 2;nonskid shoes/socks when out of bed;Fall Risk reviewed with patient/family

## 2017-12-25 NOTE — PLAN OF CARE
Pre-operative Discussion Note  Liver Transplant Surgery    Marcie Bazan is a 52 y.o. female admitted for liver transplant.  I discussed the planned procedure in detail, including expected hospital course and outcomes, benefits, risks, and potential complications.  Complications discussed included death, graft failure, bleeding, infection, rejection, and neurologic problems.  I discussed the risks of anesthesia, as well as the potential need for re-operation.  The possibility of other complications not specifically mentioned was also discussed.  Also, I discussed the need for lifelong immunosuppression and the possibility of serious complications from immunosuppressive drugs.    The discussion included the risks that the patient will incur if she elects to not have the proposed procedure.    Relevant donor-specific risk factors were disclosed and discussed with the patient, including:   none    Specific PHS Increased Risk Behavior criteria for the organ donor include:  None    HCV Recurrence: Not applicable.    Hepatocellular Carcinoma Recurrence: Not applicable.    All questions were answered.  The patient and available family members voice understanding and agree to proceed with the transplant.    UNOS Patient Status  Note on scores:  ICU = 10 = total assistance  TSU = 20-30 = partial assistance  Outpatient admitted for transplant requiring medical care in last year = 40-50 = partial assistance  Scores 60 or higher indicate no assistance, meaning no need for medical care in last year. This would be very unusual for a transplant candidate.    Functional Status: 20% - Very sick, hospitalization necessary: active treatment necessary  Physical Capacity: Wheelchair bound or more limited

## 2017-12-25 NOTE — SUBJECTIVE & OBJECTIVE
Interval History: Overnight CVP 5-8 received 2L albumin. UOP decreased.    Scheduled Meds:   custom IVPB builder  1 g Intravenous Q24H    lactulose  30 g Oral TID    pantoprazole  40 mg Oral Daily    pramoxine-hydrocortisone   Rectal BID    rifAXImin  550 mg Oral BID    vancomycin (VANCOCIN) IVPB  750 mg Intravenous Q24H    zinc sulfate  220 mg Oral BID     Continuous Infusions:  PRN Meds:sodium chloride, sodium chloride, sodium chloride, ampicillin-sulbactim (UNASYN) IVPB, GI cocktail (mylanta 30 mL, lidocaine 2 % viscous 10 mL, dicyclomine 10 mL) 50 mL, mupirocin, ondansetron, ondansetron, simethicone, sodium chloride 0.9%, sodium chloride 0.9%, sodium chloride 0.9%    Review of Systems   Unable to perform ROS: Intubated     Objective:     Vital Signs (Most Recent):  Temp: 98 °F (36.7 °C) (12/25/17 0809)  Pulse: 89 (12/25/17 0809)  Resp: 16 (12/25/17 0809)  BP: 112/62 (12/25/17 0809)  SpO2: 100 % (12/25/17 0809) Vital Signs (24h Range):  Temp:  [97.6 °F (36.4 °C)-98.8 °F (37.1 °C)] 98 °F (36.7 °C)  Pulse:  [87-92] 89  Resp:  [16-19] 16  SpO2:  [100 %] 100 %  BP: (101-112)/(50-62) 112/62     Weight: 53.3 kg (117 lb 8.1 oz)  Body mass index is 19.55 kg/m².    Intake/Output - Last 3 Shifts       12/23 0700 - 12/24 0659 12/24 0700 - 12/25 0659 12/25 0700 - 12/26 0659    P.O. 320 100     IV Piggyback 50 50     Total Intake(mL/kg) 370 (6.9) 150 (2.8)     Urine (mL/kg/hr) 0 (0)      Emesis/NG output 0 (0)      Stool 0 (0)      Total Output 0        Net +370 +150             Urine Occurrence 3 x 3 x     Stool Occurrence 9 x 4 x     Emesis Occurrence 0 x            Physical Exam   Constitutional: She appears well-developed.   Chronically ill-appearing. Malnourished. Temporal wasting.     HENT:   Head: Normocephalic and atraumatic.   Eyes: Scleral icterus is present.   Cardiovascular: Normal rate and regular rhythm.    Pulmonary/Chest: Effort normal.   Abdominal: Soft.   JPs non-bilious, ss  Incision with bandage    Musculoskeletal: She exhibits no edema.   Neurological: She is alert.   Skin: Skin is warm and dry. No rash noted.   Psychiatric: Her mood appears not anxious.   Nursing note and vitals reviewed.      Laboratory:  Immunosuppressants         Stop Route Frequency     mycophenolate capsule 1,000 mg      -- Oral 2 times daily     tacrolimus capsule 1 mg      -- Oral 2 times daily        CBC:     Recent Labs  Lab 12/27/17 1427   WBC 14.90*   RBC 2.63*   HGB 8.3*   HCT 22.6*   *   MCV 86   MCH 31.6*   MCHC 36.7*     CMP:   Recent Labs  Lab 12/27/17 0126 12/27/17 1427 12/27/17  1914   *  < >  --  151*   CALCIUM 8.0*  < >  --  8.0*   ALBUMIN 4.1  --   --   --    PROT 5.9*  --   --   --    *  < >  --  136   K 3.9  < > 3.6 3.4*   CO2 16*  < >  --  17*     < >  --  109   BUN 23*  < >  --  28*   CREATININE 1.3  < >  --  1.6*   ALKPHOS 93  --   --   --    *  --   --   --    *  288*  < > 91*  --    BILITOT 4.9*  --   --   --    < > = values in this interval not displayed.  Coagulation:   Recent Labs  Lab 12/27/17 0126 12/27/17 1427   INR 1.4*  < > 1.3*   APTT 44.8*  --   --    < > = values in this interval not displayed.  Labs within the past 24 hours have been reviewed.

## 2017-12-26 ENCOUNTER — SURGERY (OUTPATIENT)
Age: 52
End: 2017-12-26

## 2017-12-26 PROBLEM — C22.0 HCC (HEPATOCELLULAR CARCINOMA): Status: RESOLVED | Noted: 2017-10-21 | Resolved: 2017-12-26

## 2017-12-26 PROBLEM — D68.9 COAGULOPATHY: Status: RESOLVED | Noted: 2017-11-26 | Resolved: 2017-12-26

## 2017-12-26 PROBLEM — E87.1 HYPONATREMIA: Status: RESOLVED | Noted: 2017-11-20 | Resolved: 2017-12-26

## 2017-12-26 PROBLEM — D84.9 IMMUNOSUPPRESSION: Status: ACTIVE | Noted: 2017-12-26

## 2017-12-26 PROBLEM — R77.2 ELEVATED AFP: Status: RESOLVED | Noted: 2017-10-19 | Resolved: 2017-12-26

## 2017-12-26 PROBLEM — K70.31 ALCOHOLIC CIRRHOSIS OF LIVER WITH ASCITES: Status: RESOLVED | Noted: 2017-11-26 | Resolved: 2017-12-26

## 2017-12-26 PROBLEM — K76.82 HEPATIC ENCEPHALOPATHY: Status: RESOLVED | Noted: 2017-10-20 | Resolved: 2017-12-26

## 2017-12-26 PROBLEM — R17 JAUNDICE: Status: RESOLVED | Noted: 2017-09-11 | Resolved: 2017-12-26

## 2017-12-26 PROBLEM — T38.0X5A ADVERSE EFFECT OF ADRENAL CORTICAL STEROIDS: Status: ACTIVE | Noted: 2017-12-26

## 2017-12-26 PROBLEM — Z94.4 S/P LIVER TRANSPLANT: Status: ACTIVE | Noted: 2017-12-26

## 2017-12-26 PROBLEM — R73.9 HYPERGLYCEMIA: Status: ACTIVE | Noted: 2017-12-26

## 2017-12-26 PROBLEM — Z29.89 PROPHYLACTIC IMMUNOTHERAPY: Status: ACTIVE | Noted: 2017-12-26

## 2017-12-26 PROBLEM — Z79.60 LONG-TERM USE OF IMMUNOSUPPRESSANT MEDICATION: Chronic | Status: ACTIVE | Noted: 2017-12-26

## 2017-12-26 PROBLEM — R74.8 ABNORMAL LIVER ENZYMES: Status: RESOLVED | Noted: 2017-11-25 | Resolved: 2017-12-26

## 2017-12-26 PROBLEM — K72.10 END STAGE LIVER DISEASE: Status: RESOLVED | Noted: 2017-11-10 | Resolved: 2017-12-26

## 2017-12-26 LAB
ALBUMIN SERPL BCP-MCNC: 2.5 G/DL
ALBUMIN SERPL BCP-MCNC: 2.7 G/DL
ALBUMIN SERPL BCP-MCNC: 2.7 G/DL
ALBUMIN SERPL BCP-MCNC: 2.9 G/DL
ALBUMIN SERPL BCP-MCNC: 3.1 G/DL
ALLENS TEST: ABNORMAL
ALP SERPL-CCNC: 151 U/L
ALP SERPL-CCNC: 239 U/L
ALT SERPL W/O P-5'-P-CCNC: 123 U/L
ALT SERPL W/O P-5'-P-CCNC: 187 U/L
ALT SERPL W/O P-5'-P-CCNC: 40 U/L
AMYLASE SERPL-CCNC: 31 U/L
ANION GAP SERPL CALC-SCNC: 10 MMOL/L
ANION GAP SERPL CALC-SCNC: 8 MMOL/L
ANION GAP SERPL CALC-SCNC: 8 MMOL/L
ANION GAP SERPL CALC-SCNC: 9 MMOL/L
APTT BLDCRRT: 36.6 SEC
APTT BLDCRRT: 43.1 SEC
APTT BLDCRRT: 75.5 SEC
APTT BLDCRRT: 98.7 SEC
APTT BLDCRRT: >150 SEC
AST SERPL-CCNC: 187 U/L
AST SERPL-CCNC: 314 U/L
AST SERPL-CCNC: 314 U/L
AST SERPL-CCNC: 327 U/L
AST SERPL-CCNC: 370 U/L
AST SERPL-CCNC: 80 U/L
BACTERIA BLD CULT: NORMAL
BACTERIA BLD CULT: NORMAL
BACTERIA UR CULT: NO GROWTH
BASOPHILS # BLD AUTO: 0.01 K/UL
BASOPHILS # BLD AUTO: 0.01 K/UL
BASOPHILS # BLD AUTO: 0.02 K/UL
BASOPHILS # BLD AUTO: 0.07 K/UL
BASOPHILS NFR BLD: 0.1 %
BASOPHILS NFR BLD: 0.1 %
BASOPHILS NFR BLD: 0.2 %
BASOPHILS NFR BLD: 0.7 %
BILIRUB SERPL-MCNC: 12.3 MG/DL
BILIRUB SERPL-MCNC: 24.5 MG/DL
BLD PROD TYP BPU: NORMAL
BLOOD UNIT EXPIRATION DATE: NORMAL
BLOOD UNIT TYPE CODE: 6200
BLOOD UNIT TYPE: NORMAL
BUN SERPL-MCNC: 15 MG/DL
BUN SERPL-MCNC: 18 MG/DL
BUN SERPL-MCNC: 18 MG/DL
BUN SERPL-MCNC: 23 MG/DL
CA-I BLDV-SCNC: 1.04 MMOL/L
CA-I BLDV-SCNC: 1.11 MMOL/L
CA-I BLDV-SCNC: 1.13 MMOL/L
CALCIUM SERPL-MCNC: 8 MG/DL
CALCIUM SERPL-MCNC: 8 MG/DL
CALCIUM SERPL-MCNC: 8.5 MG/DL
CALCIUM SERPL-MCNC: 8.9 MG/DL
CHLORIDE SERPL-SCNC: 106 MMOL/L
CHLORIDE SERPL-SCNC: 106 MMOL/L
CHLORIDE SERPL-SCNC: 109 MMOL/L
CHLORIDE SERPL-SCNC: 97 MMOL/L
CO2 SERPL-SCNC: 17 MMOL/L
CO2 SERPL-SCNC: 20 MMOL/L
CODING SYSTEM: NORMAL
CREAT SERPL-MCNC: 0.9 MG/DL
CREAT SERPL-MCNC: 1 MG/DL
CREAT SERPL-MCNC: 1 MG/DL
CREAT SERPL-MCNC: 1.2 MG/DL
DELSYS: ABNORMAL
DIFFERENTIAL METHOD: ABNORMAL
DISPENSE STATUS: NORMAL
EOSINOPHIL # BLD AUTO: 0 K/UL
EOSINOPHIL NFR BLD: 0 %
EOSINOPHIL NFR BLD: 0.4 %
ERYTHROCYTE [DISTWIDTH] IN BLOOD BY AUTOMATED COUNT: 15.1 %
ERYTHROCYTE [DISTWIDTH] IN BLOOD BY AUTOMATED COUNT: 15.2 %
ERYTHROCYTE [DISTWIDTH] IN BLOOD BY AUTOMATED COUNT: 15.8 %
ERYTHROCYTE [DISTWIDTH] IN BLOOD BY AUTOMATED COUNT: 16.3 %
ERYTHROCYTE [SEDIMENTATION RATE] IN BLOOD BY WESTERGREN METHOD: 16 MM/H
EST. GFR  (AFRICAN AMERICAN): 59.2 ML/MIN/1.73 M^2
EST. GFR  (AFRICAN AMERICAN): >60 ML/MIN/1.73 M^2
EST. GFR  (NON AFRICAN AMERICAN): 51.4 ML/MIN/1.73 M^2
EST. GFR  (NON AFRICAN AMERICAN): >60 ML/MIN/1.73 M^2
FIBRINOGEN PPP-MCNC: 187 MG/DL
FIBRINOGEN PPP-MCNC: <70 MG/DL
FIO2: 100
FIO2: 40
FIO2: 40
FLOW: 60
GLUCOSE SERPL-MCNC: 101 MG/DL (ref 70–110)
GLUCOSE SERPL-MCNC: 137 MG/DL
GLUCOSE SERPL-MCNC: 170 MG/DL (ref 70–110)
GLUCOSE SERPL-MCNC: 188 MG/DL
GLUCOSE SERPL-MCNC: 188 MG/DL
GLUCOSE SERPL-MCNC: 209 MG/DL (ref 70–110)
GLUCOSE SERPL-MCNC: 214 MG/DL
GLUCOSE SERPL-MCNC: 214 MG/DL
GLUCOSE SERPL-MCNC: 216 MG/DL (ref 70–110)
GLUCOSE SERPL-MCNC: 85 MG/DL
GLUCOSE SERPL-MCNC: 86 MG/DL (ref 70–110)
GLUCOSE SERPL-MCNC: 87 MG/DL (ref 70–110)
GLUCOSE SERPL-MCNC: 90 MG/DL
GLUCOSE SERPL-MCNC: 99 MG/DL (ref 70–110)
HCO3 UR-SCNC: 17.4 MMOL/L (ref 24–28)
HCO3 UR-SCNC: 17.9 MMOL/L (ref 24–28)
HCO3 UR-SCNC: 18.1 MMOL/L (ref 24–28)
HCO3 UR-SCNC: 18.9 MMOL/L (ref 24–28)
HCO3 UR-SCNC: 19.1 MMOL/L (ref 24–28)
HCO3 UR-SCNC: 19.1 MMOL/L (ref 24–28)
HCO3 UR-SCNC: 20 MMOL/L (ref 24–28)
HCO3 UR-SCNC: 20.3 MMOL/L (ref 24–28)
HCO3 UR-SCNC: 21.2 MMOL/L (ref 24–28)
HCO3 UR-SCNC: 22.4 MMOL/L (ref 24–28)
HCO3 UR-SCNC: 24.2 MMOL/L (ref 24–28)
HCT VFR BLD AUTO: 19.8 %
HCT VFR BLD AUTO: 25.8 %
HCT VFR BLD AUTO: 27.3 %
HCT VFR BLD AUTO: 27.7 %
HCT VFR BLD AUTO: 28.5 %
HCT VFR BLD AUTO: 28.6 %
HCT VFR BLD AUTO: 29.1 %
HCT VFR BLD CALC: 20 %PCV (ref 36–54)
HCT VFR BLD CALC: 21 %PCV (ref 36–54)
HCT VFR BLD CALC: 24 %PCV (ref 36–54)
HCT VFR BLD CALC: 25 %PCV (ref 36–54)
HCT VFR BLD CALC: 26 %PCV (ref 36–54)
HCT VFR BLD CALC: 27 %PCV (ref 36–54)
HCT VFR BLD CALC: 28 %PCV (ref 36–54)
HCT VFR BLD CALC: 29 %PCV (ref 36–54)
HGB BLD-MCNC: 10.2 G/DL
HGB BLD-MCNC: 10.4 G/DL
HGB BLD-MCNC: 10.4 G/DL
HGB BLD-MCNC: 7.1 G/DL
HGB BLD-MCNC: 9.2 G/DL
HGB BLD-MCNC: 9.8 G/DL
HGB BLD-MCNC: 9.9 G/DL
IMM GRANULOCYTES # BLD AUTO: 0.03 K/UL
IMM GRANULOCYTES # BLD AUTO: 0.07 K/UL
IMM GRANULOCYTES # BLD AUTO: 0.12 K/UL
IMM GRANULOCYTES # BLD AUTO: 0.22 K/UL
IMM GRANULOCYTES NFR BLD AUTO: 0.3 %
IMM GRANULOCYTES NFR BLD AUTO: 0.7 %
IMM GRANULOCYTES NFR BLD AUTO: 1.2 %
IMM GRANULOCYTES NFR BLD AUTO: 2.2 %
INR PPP: 1.4
INR PPP: 1.6
INR PPP: 1.6
INR PPP: 1.7
INR PPP: 2.1
INR PPP: 2.6
INR PPP: 3.1
INR PPP: 3.2
LDH SERPL L TO P-CCNC: 1307 U/L
LYMPHOCYTES # BLD AUTO: 0.3 K/UL
LYMPHOCYTES # BLD AUTO: 0.3 K/UL
LYMPHOCYTES # BLD AUTO: 0.4 K/UL
LYMPHOCYTES # BLD AUTO: 2.7 K/UL
LYMPHOCYTES NFR BLD: 27 %
LYMPHOCYTES NFR BLD: 3.1 %
LYMPHOCYTES NFR BLD: 3.3 %
LYMPHOCYTES NFR BLD: 3.9 %
MAGNESIUM SERPL-MCNC: 1 MG/DL
MAGNESIUM SERPL-MCNC: 1.1 MG/DL
MAGNESIUM SERPL-MCNC: 1.1 MG/DL
MAGNESIUM SERPL-MCNC: 1.3 MG/DL
MAGNESIUM SERPL-MCNC: 1.4 MG/DL
MAGNESIUM SERPL-MCNC: 2.2 MG/DL
MCH RBC QN AUTO: 30.7 PG
MCH RBC QN AUTO: 31 PG
MCH RBC QN AUTO: 31.2 PG
MCH RBC QN AUTO: 31.3 PG
MCHC RBC AUTO-ENTMCNC: 35.7 G/DL
MCHC RBC AUTO-ENTMCNC: 35.7 G/DL
MCHC RBC AUTO-ENTMCNC: 35.8 G/DL
MCHC RBC AUTO-ENTMCNC: 36.4 G/DL
MCV RBC AUTO: 86 FL
MCV RBC AUTO: 86 FL
MCV RBC AUTO: 87 FL
MCV RBC AUTO: 88 FL
MIN VOL: 6
MODE: ABNORMAL
MONOCYTES # BLD AUTO: 0.2 K/UL
MONOCYTES # BLD AUTO: 0.3 K/UL
MONOCYTES # BLD AUTO: 0.3 K/UL
MONOCYTES # BLD AUTO: 1.3 K/UL
MONOCYTES NFR BLD: 1.7 %
MONOCYTES NFR BLD: 13.2 %
MONOCYTES NFR BLD: 2.6 %
MONOCYTES NFR BLD: 2.7 %
NEUTROPHILS # BLD AUTO: 5.8 K/UL
NEUTROPHILS # BLD AUTO: 9.2 K/UL
NEUTROPHILS # BLD AUTO: 9.6 K/UL
NEUTROPHILS # BLD AUTO: 9.8 K/UL
NEUTROPHILS NFR BLD: 58.4 %
NEUTROPHILS NFR BLD: 91.7 %
NEUTROPHILS NFR BLD: 92.6 %
NEUTROPHILS NFR BLD: 93.9 %
NRBC BLD-RTO: 0 /100 WBC
PCO2 BLDA: 26.6 MMHG (ref 35–45)
PCO2 BLDA: 28.3 MMHG (ref 35–45)
PCO2 BLDA: 30.2 MMHG (ref 35–45)
PCO2 BLDA: 30.5 MMHG (ref 35–45)
PCO2 BLDA: 31.7 MMHG (ref 35–45)
PCO2 BLDA: 31.8 MMHG (ref 35–45)
PCO2 BLDA: 32.8 MMHG (ref 35–45)
PCO2 BLDA: 33.6 MMHG (ref 35–45)
PCO2 BLDA: 36.3 MMHG (ref 35–45)
PCO2 BLDA: 38 MMHG (ref 35–45)
PCO2 BLDA: 41.2 MMHG (ref 35–45)
PEEP: 5
PH SMN: 7.29 [PH] (ref 7.35–7.45)
PH SMN: 7.36 [PH] (ref 7.35–7.45)
PH SMN: 7.37 [PH] (ref 7.35–7.45)
PH SMN: 7.38 [PH] (ref 7.35–7.45)
PH SMN: 7.38 [PH] (ref 7.35–7.45)
PH SMN: 7.4 [PH] (ref 7.35–7.45)
PH SMN: 7.41 [PH] (ref 7.35–7.45)
PH SMN: 7.42 [PH] (ref 7.35–7.45)
PH SMN: 7.48 [PH] (ref 7.35–7.45)
PHOSPHATE SERPL-MCNC: 2.4 MG/DL
PHOSPHATE SERPL-MCNC: 3.8 MG/DL
PIP: 26
PLATELET # BLD AUTO: 55 K/UL
PLATELET # BLD AUTO: 68 K/UL
PLATELET # BLD AUTO: 84 K/UL
PLATELET # BLD AUTO: 86 K/UL
PLATELET # BLD AUTO: 89 K/UL
PLATELET # BLD AUTO: 93 K/UL
PLATELET # BLD AUTO: 96 K/UL
PMV BLD AUTO: 10 FL
PMV BLD AUTO: 10 FL
PMV BLD AUTO: 10.4 FL
PMV BLD AUTO: 10.5 FL
PMV BLD AUTO: 9.5 FL
PMV BLD AUTO: 9.7 FL
PMV BLD AUTO: 9.8 FL
PO2 BLDA: 163 MMHG (ref 80–100)
PO2 BLDA: 207 MMHG (ref 80–100)
PO2 BLDA: 208 MMHG (ref 80–100)
PO2 BLDA: 209 MMHG (ref 80–100)
PO2 BLDA: 212 MMHG (ref 80–100)
PO2 BLDA: 213 MMHG (ref 80–100)
PO2 BLDA: 216 MMHG (ref 80–100)
PO2 BLDA: 233 MMHG (ref 80–100)
PO2 BLDA: 458 MMHG (ref 80–100)
PO2 BLDA: 468 MMHG (ref 80–100)
PO2 BLDA: 47 MMHG (ref 40–60)
POC BE: -1 MMOL/L
POC BE: -2 MMOL/L
POC BE: -2 MMOL/L
POC BE: -4 MMOL/L
POC BE: -5 MMOL/L
POC BE: -6 MMOL/L
POC BE: -7 MMOL/L
POC BE: -7 MMOL/L
POC BE: -9 MMOL/L
POC IONIZED CALCIUM: 0.93 MMOL/L (ref 1.06–1.42)
POC IONIZED CALCIUM: 1.06 MMOL/L (ref 1.06–1.42)
POC IONIZED CALCIUM: 1.11 MMOL/L (ref 1.06–1.42)
POC IONIZED CALCIUM: 1.16 MMOL/L (ref 1.06–1.42)
POC IONIZED CALCIUM: 1.18 MMOL/L (ref 1.06–1.42)
POC IONIZED CALCIUM: 1.19 MMOL/L (ref 1.06–1.42)
POC IONIZED CALCIUM: 1.2 MMOL/L (ref 1.06–1.42)
POC IONIZED CALCIUM: 1.2 MMOL/L (ref 1.06–1.42)
POC SATURATED O2: 100 % (ref 95–100)
POC SATURATED O2: 83 % (ref 95–100)
POC SATURATED O2: 99 % (ref 95–100)
POC TCO2: 18 MMOL/L (ref 23–27)
POC TCO2: 19 MMOL/L (ref 23–27)
POC TCO2: 19 MMOL/L (ref 24–29)
POC TCO2: 20 MMOL/L (ref 23–27)
POC TCO2: 21 MMOL/L (ref 23–27)
POC TCO2: 21 MMOL/L (ref 23–27)
POC TCO2: 22 MMOL/L (ref 23–27)
POC TCO2: 23 MMOL/L (ref 23–27)
POC TCO2: 25 MMOL/L (ref 23–27)
POTASSIUM BLD-SCNC: 3.3 MMOL/L (ref 3.5–5.1)
POTASSIUM BLD-SCNC: 3.6 MMOL/L (ref 3.5–5.1)
POTASSIUM BLD-SCNC: 3.7 MMOL/L (ref 3.5–5.1)
POTASSIUM BLD-SCNC: 3.8 MMOL/L (ref 3.5–5.1)
POTASSIUM BLD-SCNC: 3.9 MMOL/L (ref 3.5–5.1)
POTASSIUM BLD-SCNC: 3.9 MMOL/L (ref 3.5–5.1)
POTASSIUM SERPL-SCNC: 3.4 MMOL/L
POTASSIUM SERPL-SCNC: 3.4 MMOL/L
POTASSIUM SERPL-SCNC: 3.5 MMOL/L
POTASSIUM SERPL-SCNC: 3.6 MMOL/L
POTASSIUM SERPL-SCNC: 3.8 MMOL/L
POTASSIUM SERPL-SCNC: 4 MMOL/L
POTASSIUM SERPL-SCNC: 4.6 MMOL/L
PROT SERPL-MCNC: 5.1 G/DL
PROT SERPL-MCNC: 6.2 G/DL
PROTHROMBIN TIME: 14.1 SEC
PROTHROMBIN TIME: 16.2 SEC
PROTHROMBIN TIME: 16.5 SEC
PROTHROMBIN TIME: 17.2 SEC
PROTHROMBIN TIME: 21.1 SEC
PROTHROMBIN TIME: 25.6 SEC
PROTHROMBIN TIME: 31.1 SEC
PROTHROMBIN TIME: 32.3 SEC
PS: 10
RBC # BLD AUTO: 3.16 M/UL
RBC # BLD AUTO: 3.29 M/UL
RBC # BLD AUTO: 3.33 M/UL
RBC # BLD AUTO: 3.39 M/UL
SAMPLE: ABNORMAL
SITE: ABNORMAL
SODIUM BLD-SCNC: 131 MMOL/L (ref 136–145)
SODIUM BLD-SCNC: 132 MMOL/L (ref 136–145)
SODIUM BLD-SCNC: 132 MMOL/L (ref 136–145)
SODIUM BLD-SCNC: 133 MMOL/L (ref 136–145)
SODIUM BLD-SCNC: 133 MMOL/L (ref 136–145)
SODIUM BLD-SCNC: 135 MMOL/L (ref 136–145)
SODIUM SERPL-SCNC: 127 MMOL/L
SODIUM SERPL-SCNC: 128 MMOL/L
SODIUM SERPL-SCNC: 130 MMOL/L
SODIUM SERPL-SCNC: 132 MMOL/L
SODIUM SERPL-SCNC: 134 MMOL/L
SODIUM SERPL-SCNC: 134 MMOL/L
SODIUM SERPL-SCNC: 135 MMOL/L
SP02: 100
SP02: 100
SPONT RATE: 15
TRANS PLATPHERESIS VOL PATIENT: NORMAL ML
UNIT NUMBER: NORMAL
UNIT NUMBER: NORMAL
VT: 500
WBC # BLD AUTO: 10.21 K/UL
WBC # BLD AUTO: 10.52 K/UL
WBC # BLD AUTO: 9.96 K/UL
WBC # BLD AUTO: 9.99 K/UL

## 2017-12-26 PROCEDURE — 25000003 PHARM REV CODE 250: Performed by: TRANSPLANT SURGERY

## 2017-12-26 PROCEDURE — 80053 COMPREHEN METABOLIC PANEL: CPT

## 2017-12-26 PROCEDURE — 84100 ASSAY OF PHOSPHORUS: CPT | Mod: 91

## 2017-12-26 PROCEDURE — 83615 LACTATE (LD) (LDH) ENZYME: CPT

## 2017-12-26 PROCEDURE — 85049 AUTOMATED PLATELET COUNT: CPT | Mod: 91

## 2017-12-26 PROCEDURE — 27000191 HC C-V MONITORING

## 2017-12-26 PROCEDURE — P9045 ALBUMIN (HUMAN), 5%, 250 ML: HCPCS

## 2017-12-26 PROCEDURE — 85002 BLEEDING TIME TEST: CPT

## 2017-12-26 PROCEDURE — 88313 SPECIAL STAINS GROUP 2: CPT | Mod: 26,,, | Performed by: PATHOLOGY

## 2017-12-26 PROCEDURE — 63600175 PHARM REV CODE 636 W HCPCS: Performed by: NURSE ANESTHETIST, CERTIFIED REGISTERED

## 2017-12-26 PROCEDURE — 94002 VENT MGMT INPAT INIT DAY: CPT

## 2017-12-26 PROCEDURE — 82150 ASSAY OF AMYLASE: CPT

## 2017-12-26 PROCEDURE — 25000003 PHARM REV CODE 250: Performed by: NURSE ANESTHETIST, CERTIFIED REGISTERED

## 2017-12-26 PROCEDURE — 47135 TRANSPLANTATION OF LIVER: CPT | Mod: 80,,, | Performed by: TRANSPLANT SURGERY

## 2017-12-26 PROCEDURE — P9017 PLASMA 1 DONOR FRZ W/IN 8 HR: HCPCS

## 2017-12-26 PROCEDURE — 88309 TISSUE EXAM BY PATHOLOGIST: CPT | Mod: 26,,, | Performed by: PATHOLOGY

## 2017-12-26 PROCEDURE — 88307 TISSUE EXAM BY PATHOLOGIST: CPT | Performed by: PATHOLOGY

## 2017-12-26 PROCEDURE — 63600175 PHARM REV CODE 636 W HCPCS: Performed by: STUDENT IN AN ORGANIZED HEALTH CARE EDUCATION/TRAINING PROGRAM

## 2017-12-26 PROCEDURE — P9012 CRYOPRECIPITATE EACH UNIT: HCPCS

## 2017-12-26 PROCEDURE — 85014 HEMATOCRIT: CPT

## 2017-12-26 PROCEDURE — 27100025 HC TUBING, SET FLUID WARMER: Mod: NTX | Performed by: NURSE ANESTHETIST, CERTIFIED REGISTERED

## 2017-12-26 PROCEDURE — 94010 BREATHING CAPACITY TEST: CPT

## 2017-12-26 PROCEDURE — 82040 ASSAY OF SERUM ALBUMIN: CPT | Mod: 91

## 2017-12-26 PROCEDURE — P9045 ALBUMIN (HUMAN), 5%, 250 ML: HCPCS | Performed by: STUDENT IN AN ORGANIZED HEALTH CARE EDUCATION/TRAINING PROGRAM

## 2017-12-26 PROCEDURE — 37000008 HC ANESTHESIA 1ST 15 MINUTES: Performed by: TRANSPLANT SURGERY

## 2017-12-26 PROCEDURE — 80048 BASIC METABOLIC PNL TOTAL CA: CPT

## 2017-12-26 PROCEDURE — 25000003 PHARM REV CODE 250: Performed by: NURSE PRACTITIONER

## 2017-12-26 PROCEDURE — 94150 VITAL CAPACITY TEST: CPT

## 2017-12-26 PROCEDURE — P9035 PLATELET PHERES LEUKOREDUCED: HCPCS

## 2017-12-26 PROCEDURE — 0FB03ZX EXCISION OF LIVER, PERCUTANEOUS APPROACH, DIAGNOSTIC: ICD-10-PCS | Performed by: TRANSPLANT SURGERY

## 2017-12-26 PROCEDURE — 82330 ASSAY OF CALCIUM: CPT | Mod: 91

## 2017-12-26 PROCEDURE — D9220A PRA ANESTHESIA: Mod: ,,, | Performed by: ANESTHESIOLOGY

## 2017-12-26 PROCEDURE — 88307 TISSUE EXAM BY PATHOLOGIST: CPT | Mod: 26,,, | Performed by: PATHOLOGY

## 2017-12-26 PROCEDURE — P9021 RED BLOOD CELLS UNIT: HCPCS

## 2017-12-26 PROCEDURE — 85018 HEMOGLOBIN: CPT

## 2017-12-26 PROCEDURE — 83735 ASSAY OF MAGNESIUM: CPT | Mod: 91

## 2017-12-26 PROCEDURE — 63600175 PHARM REV CODE 636 W HCPCS

## 2017-12-26 PROCEDURE — 27100021 HC MULTIPORT INFUSION MANIFOLD: Mod: NTX | Performed by: NURSE ANESTHETIST, CERTIFIED REGISTERED

## 2017-12-26 PROCEDURE — 85730 THROMBOPLASTIN TIME PARTIAL: CPT | Mod: 91

## 2017-12-26 PROCEDURE — 82330 ASSAY OF CALCIUM: CPT

## 2017-12-26 PROCEDURE — S0028 INJECTION, FAMOTIDINE, 20 MG: HCPCS | Performed by: TRANSPLANT SURGERY

## 2017-12-26 PROCEDURE — 27201423 OPTIME MED/SURG SUP & DEVICES STERILE SUPPLY: Performed by: TRANSPLANT SURGERY

## 2017-12-26 PROCEDURE — 84100 ASSAY OF PHOSPHORUS: CPT

## 2017-12-26 PROCEDURE — 47135 TRANSPLANTATION OF LIVER: CPT | Mod: ,,, | Performed by: TRANSPLANT SURGERY

## 2017-12-26 PROCEDURE — 36000931 HC OR TIME LEV VII EA ADD 15 MIN: Performed by: TRANSPLANT SURGERY

## 2017-12-26 PROCEDURE — 63600175 PHARM REV CODE 636 W HCPCS: Performed by: TRANSPLANT SURGERY

## 2017-12-26 PROCEDURE — 36415 COLL VENOUS BLD VENIPUNCTURE: CPT

## 2017-12-26 PROCEDURE — 84132 ASSAY OF SERUM POTASSIUM: CPT

## 2017-12-26 PROCEDURE — 93503 INSERT/PLACE HEART CATHETER: CPT | Mod: 59,,, | Performed by: ANESTHESIOLOGY

## 2017-12-26 PROCEDURE — 84460 ALANINE AMINO (ALT) (SGPT): CPT

## 2017-12-26 PROCEDURE — 84132 ASSAY OF SERUM POTASSIUM: CPT | Mod: 91

## 2017-12-26 PROCEDURE — 82803 BLOOD GASES ANY COMBINATION: CPT

## 2017-12-26 PROCEDURE — 37799 UNLISTED PX VASCULAR SURGERY: CPT

## 2017-12-26 PROCEDURE — 84295 ASSAY OF SERUM SODIUM: CPT | Mod: 91

## 2017-12-26 PROCEDURE — 27800506 HC CATH, RAPID INFUSION (7.5&8.5): Mod: NTX | Performed by: NURSE ANESTHETIST, CERTIFIED REGISTERED

## 2017-12-26 PROCEDURE — 25000003 PHARM REV CODE 250: Mod: NTX | Performed by: NURSE PRACTITIONER

## 2017-12-26 PROCEDURE — 27000221 HC OXYGEN, UP TO 24 HOURS

## 2017-12-26 PROCEDURE — 25000003 PHARM REV CODE 250

## 2017-12-26 PROCEDURE — P9047 ALBUMIN (HUMAN), 25%, 50ML: HCPCS | Performed by: NURSE ANESTHETIST, CERTIFIED REGISTERED

## 2017-12-26 PROCEDURE — C1751 CATH, INF, PER/CENT/MIDLINE: HCPCS | Mod: NTX | Performed by: NURSE ANESTHETIST, CERTIFIED REGISTERED

## 2017-12-26 PROCEDURE — 81300000 HC LIVER ACQUISITION CHARGE

## 2017-12-26 PROCEDURE — 85384 FIBRINOGEN ACTIVITY: CPT | Mod: 91

## 2017-12-26 PROCEDURE — 99900035 HC TECH TIME PER 15 MIN (STAT)

## 2017-12-26 PROCEDURE — 84295 ASSAY OF SERUM SODIUM: CPT

## 2017-12-26 PROCEDURE — 85610 PROTHROMBIN TIME: CPT | Mod: 91

## 2017-12-26 PROCEDURE — 36000930 HC OR TIME LEV VII 1ST 15 MIN: Performed by: TRANSPLANT SURGERY

## 2017-12-26 PROCEDURE — 84450 TRANSFERASE (AST) (SGOT): CPT | Mod: 91

## 2017-12-26 PROCEDURE — 99223 1ST HOSP IP/OBS HIGH 75: CPT | Mod: ,,, | Performed by: NURSE PRACTITIONER

## 2017-12-26 PROCEDURE — 81300005 HC LIVER TRANSPORT, GROUND 4-5 HOURS

## 2017-12-26 PROCEDURE — 80053 COMPREHEN METABOLIC PANEL: CPT | Mod: 91

## 2017-12-26 PROCEDURE — 63600175 PHARM REV CODE 636 W HCPCS: Mod: NTX | Performed by: NURSE PRACTITIONER

## 2017-12-26 PROCEDURE — 85610 PROTHROMBIN TIME: CPT

## 2017-12-26 PROCEDURE — 27100088 HC CELL SAVER

## 2017-12-26 PROCEDURE — 99900017 HC EXTUBATION W/PARAMETERS (STAT)

## 2017-12-26 PROCEDURE — 25000003 PHARM REV CODE 250: Mod: NTX | Performed by: STUDENT IN AN ORGANIZED HEALTH CARE EDUCATION/TRAINING PROGRAM

## 2017-12-26 PROCEDURE — 86901 BLOOD TYPING SEROLOGIC RH(D): CPT

## 2017-12-26 PROCEDURE — 85520 HEPARIN ASSAY: CPT

## 2017-12-26 PROCEDURE — 0FY00Z0 TRANSPLANTATION OF LIVER, ALLOGENEIC, OPEN APPROACH: ICD-10-PCS | Performed by: TRANSPLANT SURGERY

## 2017-12-26 PROCEDURE — 37000009 HC ANESTHESIA EA ADD 15 MINS: Performed by: TRANSPLANT SURGERY

## 2017-12-26 PROCEDURE — 82947 ASSAY GLUCOSE BLOOD QUANT: CPT | Mod: 91

## 2017-12-26 PROCEDURE — 25000003 PHARM REV CODE 250: Performed by: STUDENT IN AN ORGANIZED HEALTH CARE EDUCATION/TRAINING PROGRAM

## 2017-12-26 PROCEDURE — 27201041 HC RESERVOIR, CARDIOTOMY

## 2017-12-26 PROCEDURE — S5010 5% DEXTROSE AND 0.45% SALINE: HCPCS | Performed by: TRANSPLANT SURGERY

## 2017-12-26 PROCEDURE — 83735 ASSAY OF MAGNESIUM: CPT

## 2017-12-26 PROCEDURE — 88309 TISSUE EXAM BY PATHOLOGIST: CPT | Performed by: PATHOLOGY

## 2017-12-26 PROCEDURE — 20000000 HC ICU ROOM

## 2017-12-26 PROCEDURE — 81300031 HC LIVER TRANSPORT, FLIGHT WITHIN 301-700 MILES

## 2017-12-26 PROCEDURE — 85025 COMPLETE CBC W/AUTO DIFF WBC: CPT | Mod: 91

## 2017-12-26 PROCEDURE — C1729 CATH, DRAINAGE: HCPCS | Performed by: TRANSPLANT SURGERY

## 2017-12-26 PROCEDURE — 36620 INSERTION CATHETER ARTERY: CPT | Mod: 59,,, | Performed by: ANESTHESIOLOGY

## 2017-12-26 RX ORDER — MAGNESIUM SULFATE HEPTAHYDRATE 40 MG/ML
2 INJECTION, SOLUTION INTRAVENOUS
Status: DISCONTINUED | OUTPATIENT
Start: 2017-12-26 | End: 2017-12-31

## 2017-12-26 RX ORDER — MIDAZOLAM HYDROCHLORIDE 1 MG/ML
INJECTION, SOLUTION INTRAMUSCULAR; INTRAVENOUS
Status: DISCONTINUED | OUTPATIENT
Start: 2017-12-26 | End: 2017-12-26

## 2017-12-26 RX ORDER — DEXTROSE MONOHYDRATE AND SODIUM CHLORIDE 5; .45 G/100ML; G/100ML
INJECTION, SOLUTION INTRAVENOUS CONTINUOUS
Status: DISCONTINUED | OUTPATIENT
Start: 2017-12-26 | End: 2017-12-28

## 2017-12-26 RX ORDER — FAMOTIDINE 10 MG/ML
20 INJECTION INTRAVENOUS EVERY 12 HOURS
Status: DISCONTINUED | OUTPATIENT
Start: 2017-12-26 | End: 2017-12-27

## 2017-12-26 RX ORDER — POTASSIUM CHLORIDE 29.8 MG/ML
40 INJECTION INTRAVENOUS
Status: DISCONTINUED | OUTPATIENT
Start: 2017-12-26 | End: 2017-12-31

## 2017-12-26 RX ORDER — HEPARIN SODIUM 1000 [USP'U]/ML
INJECTION, SOLUTION INTRAVENOUS; SUBCUTANEOUS
Status: DISCONTINUED | OUTPATIENT
Start: 2017-12-26 | End: 2017-12-26 | Stop reason: HOSPADM

## 2017-12-26 RX ORDER — METHYLPREDNISOLONE SOD SUCC 125 MG
100 VIAL (EA) INJECTION EVERY 12 HOURS
Status: COMPLETED | OUTPATIENT
Start: 2017-12-27 | End: 2017-12-27

## 2017-12-26 RX ORDER — FENTANYL CITRATE 50 UG/ML
25 INJECTION, SOLUTION INTRAMUSCULAR; INTRAVENOUS ONCE
Status: COMPLETED | OUTPATIENT
Start: 2017-12-26 | End: 2017-12-26

## 2017-12-26 RX ORDER — HYDROMORPHONE HYDROCHLORIDE 1 MG/ML
0.5 INJECTION, SOLUTION INTRAMUSCULAR; INTRAVENOUS; SUBCUTANEOUS EVERY 4 HOURS PRN
Status: DISCONTINUED | OUTPATIENT
Start: 2017-12-26 | End: 2017-12-28

## 2017-12-26 RX ORDER — FLUCONAZOLE 2 MG/ML
400 INJECTION, SOLUTION INTRAVENOUS
Status: DISCONTINUED | OUTPATIENT
Start: 2017-12-26 | End: 2017-12-27

## 2017-12-26 RX ORDER — PROPOFOL 10 MG/ML
INJECTION, EMULSION INTRAVENOUS
Status: COMPLETED
Start: 2017-12-26 | End: 2017-12-26

## 2017-12-26 RX ORDER — SULFAMETHOXAZOLE AND TRIMETHOPRIM 400; 80 MG/1; MG/1
1 TABLET ORAL EVERY MORNING
Status: DISCONTINUED | OUTPATIENT
Start: 2017-12-26 | End: 2017-12-29

## 2017-12-26 RX ORDER — LIDOCAINE HCL/PF 100 MG/5ML
SYRINGE (ML) INTRAVENOUS
Status: DISCONTINUED | OUTPATIENT
Start: 2017-12-26 | End: 2017-12-26

## 2017-12-26 RX ORDER — VALGANCICLOVIR HYDROCHLORIDE 50 MG/ML
450 POWDER, FOR SOLUTION ORAL EVERY MORNING
Status: DISCONTINUED | OUTPATIENT
Start: 2017-12-26 | End: 2017-12-27

## 2017-12-26 RX ORDER — NICARDIPINE HYDROCHLORIDE 0.2 MG/ML
1 INJECTION INTRAVENOUS CONTINUOUS
Status: DISCONTINUED | OUTPATIENT
Start: 2017-12-26 | End: 2017-12-28

## 2017-12-26 RX ORDER — NYSTATIN 100000 [USP'U]/ML
500000 SUSPENSION ORAL 4 TIMES DAILY
Status: DISCONTINUED | OUTPATIENT
Start: 2017-12-26 | End: 2017-12-29

## 2017-12-26 RX ORDER — ONDANSETRON 2 MG/ML
INJECTION INTRAMUSCULAR; INTRAVENOUS
Status: DISCONTINUED | OUTPATIENT
Start: 2017-12-26 | End: 2017-12-26

## 2017-12-26 RX ORDER — MAGNESIUM SULFATE HEPTAHYDRATE 40 MG/ML
2 INJECTION, SOLUTION INTRAVENOUS ONCE
Status: COMPLETED | OUTPATIENT
Start: 2017-12-26 | End: 2017-12-26

## 2017-12-26 RX ORDER — MAGNESIUM SULFATE HEPTAHYDRATE 40 MG/ML
4 INJECTION, SOLUTION INTRAVENOUS
Status: DISCONTINUED | OUTPATIENT
Start: 2017-12-26 | End: 2017-12-31

## 2017-12-26 RX ORDER — SODIUM CHLORIDE 9 MG/ML
INJECTION, SOLUTION INTRAVENOUS CONTINUOUS PRN
Status: DISCONTINUED | OUTPATIENT
Start: 2017-12-26 | End: 2017-12-26

## 2017-12-26 RX ORDER — POTASSIUM CHLORIDE 14.9 MG/ML
60 INJECTION INTRAVENOUS
Status: DISCONTINUED | OUTPATIENT
Start: 2017-12-26 | End: 2017-12-31

## 2017-12-26 RX ORDER — HEPARIN SODIUM 1000 [USP'U]/ML
INJECTION, SOLUTION INTRAVENOUS; SUBCUTANEOUS
Status: DISCONTINUED | OUTPATIENT
Start: 2017-12-26 | End: 2017-12-26

## 2017-12-26 RX ORDER — ALBUMIN HUMAN 50 G/1000ML
SOLUTION INTRAVENOUS
Status: DISPENSED
Start: 2017-12-26 | End: 2017-12-27

## 2017-12-26 RX ORDER — LIDOCAINE HYDROCHLORIDE 40 MG/ML
INJECTION, SOLUTION RETROBULBAR
Status: DISCONTINUED | OUTPATIENT
Start: 2017-12-26 | End: 2017-12-26 | Stop reason: HOSPADM

## 2017-12-26 RX ORDER — ALBUMIN HUMAN 50 G/1000ML
25 SOLUTION INTRAVENOUS ONCE
Status: COMPLETED | OUTPATIENT
Start: 2017-12-26 | End: 2017-12-26

## 2017-12-26 RX ORDER — PROPOFOL 10 MG/ML
10 INJECTION, EMULSION INTRAVENOUS CONTINUOUS
Status: DISCONTINUED | OUTPATIENT
Start: 2017-12-26 | End: 2017-12-28

## 2017-12-26 RX ORDER — MANNITOL 250 MG/ML
INJECTION, SOLUTION INTRAVENOUS
Status: DISCONTINUED | OUTPATIENT
Start: 2017-12-26 | End: 2017-12-26

## 2017-12-26 RX ORDER — ALBUMIN HUMAN 250 G/1000ML
SOLUTION INTRAVENOUS CONTINUOUS PRN
Status: DISCONTINUED | OUTPATIENT
Start: 2017-12-26 | End: 2017-12-26

## 2017-12-26 RX ORDER — ROCURONIUM BROMIDE 10 MG/ML
INJECTION, SOLUTION INTRAVENOUS
Status: DISCONTINUED | OUTPATIENT
Start: 2017-12-26 | End: 2017-12-26

## 2017-12-26 RX ORDER — HEPARIN SODIUM 5000 [USP'U]/ML
5000 INJECTION, SOLUTION INTRAVENOUS; SUBCUTANEOUS EVERY 8 HOURS
Status: DISCONTINUED | OUTPATIENT
Start: 2017-12-26 | End: 2018-01-05 | Stop reason: HOSPADM

## 2017-12-26 RX ORDER — METHYLPREDNISOLONE SOD SUCC 125 MG
80 VIAL (EA) INJECTION EVERY 12 HOURS
Status: COMPLETED | OUTPATIENT
Start: 2017-12-28 | End: 2017-12-28

## 2017-12-26 RX ORDER — PREDNISONE 10 MG/1
20 TABLET ORAL DAILY
Status: DISCONTINUED | OUTPATIENT
Start: 2018-01-01 | End: 2018-01-05 | Stop reason: HOSPADM

## 2017-12-26 RX ORDER — ALBUMIN HUMAN 50 G/1000ML
SOLUTION INTRAVENOUS
Status: COMPLETED
Start: 2017-12-26 | End: 2017-12-26

## 2017-12-26 RX ORDER — METHYLPREDNISOLONE SOD SUCC 125 MG
60 VIAL (EA) INJECTION EVERY 12 HOURS
Status: COMPLETED | OUTPATIENT
Start: 2017-12-29 | End: 2017-12-29

## 2017-12-26 RX ORDER — MYCOPHENOLATE MOFETIL 200 MG/ML
1000 POWDER, FOR SUSPENSION ORAL 2 TIMES DAILY
Status: DISCONTINUED | OUTPATIENT
Start: 2017-12-26 | End: 2017-12-27

## 2017-12-26 RX ORDER — MUPIROCIN 20 MG/G
1 OINTMENT TOPICAL 2 TIMES DAILY
Status: COMPLETED | OUTPATIENT
Start: 2017-12-26 | End: 2017-12-31

## 2017-12-26 RX ORDER — POTASSIUM CHLORIDE 29.8 MG/ML
80 INJECTION INTRAVENOUS
Status: DISCONTINUED | OUTPATIENT
Start: 2017-12-26 | End: 2017-12-31

## 2017-12-26 RX ORDER — PROPOFOL 10 MG/ML
VIAL (ML) INTRAVENOUS
Status: DISCONTINUED | OUTPATIENT
Start: 2017-12-26 | End: 2017-12-26

## 2017-12-26 RX ORDER — FUROSEMIDE 10 MG/ML
INJECTION INTRAMUSCULAR; INTRAVENOUS
Status: DISCONTINUED | OUTPATIENT
Start: 2017-12-26 | End: 2017-12-26

## 2017-12-26 RX ORDER — NICARDIPINE HYDROCHLORIDE 0.2 MG/ML
INJECTION INTRAVENOUS
Status: COMPLETED
Start: 2017-12-26 | End: 2017-12-26

## 2017-12-26 RX ORDER — PHENYLEPHRINE HYDROCHLORIDE 10 MG/ML
INJECTION INTRAVENOUS
Status: DISCONTINUED | OUTPATIENT
Start: 2017-12-26 | End: 2017-12-26

## 2017-12-26 RX ORDER — FENTANYL CITRATE 50 UG/ML
INJECTION, SOLUTION INTRAMUSCULAR; INTRAVENOUS
Status: DISCONTINUED | OUTPATIENT
Start: 2017-12-26 | End: 2017-12-26

## 2017-12-26 RX ADMIN — MIDAZOLAM HYDROCHLORIDE 2 MG: 1 INJECTION, SOLUTION INTRAMUSCULAR; INTRAVENOUS at 07:12

## 2017-12-26 RX ADMIN — ALBUMIN (HUMAN): 12.5 SOLUTION INTRAVENOUS at 10:12

## 2017-12-26 RX ADMIN — POTASSIUM CHLORIDE 60 MEQ: 200 INJECTION, SOLUTION INTRAVENOUS at 06:12

## 2017-12-26 RX ADMIN — MAGNESIUM SULFATE IN WATER 2 G: 40 INJECTION, SOLUTION INTRAVENOUS at 04:12

## 2017-12-26 RX ADMIN — MUPIROCIN 1 G: 20 OINTMENT TOPICAL at 10:12

## 2017-12-26 RX ADMIN — Medication 1 MG: at 05:12

## 2017-12-26 RX ADMIN — MORPHINE 450 MG: 10 SOLUTION ORAL at 03:12

## 2017-12-26 RX ADMIN — MANNITOL 62 ML: 250 INJECTION, SOLUTION INTRAVENOUS at 12:12

## 2017-12-26 RX ADMIN — AMPICILLIN SODIUM AND SULBACTAM SODIUM 3 G: 2; 1 INJECTION, POWDER, FOR SOLUTION INTRAMUSCULAR; INTRAVENOUS at 06:12

## 2017-12-26 RX ADMIN — ROCURONIUM BROMIDE 50 MG: 10 INJECTION, SOLUTION INTRAVENOUS at 08:12

## 2017-12-26 RX ADMIN — PHENYLEPHRINE HYDROCHLORIDE 200 MCG: 10 INJECTION INTRAVENOUS at 10:12

## 2017-12-26 RX ADMIN — ROCURONIUM BROMIDE 20 MG: 10 INJECTION, SOLUTION INTRAVENOUS at 01:12

## 2017-12-26 RX ADMIN — ROCURONIUM BROMIDE 30 MG: 10 INJECTION, SOLUTION INTRAVENOUS at 11:12

## 2017-12-26 RX ADMIN — FENTANYL CITRATE 100 MCG: 50 INJECTION, SOLUTION INTRAMUSCULAR; INTRAVENOUS at 10:12

## 2017-12-26 RX ADMIN — ROCURONIUM BROMIDE 40 MG: 10 INJECTION, SOLUTION INTRAVENOUS at 09:12

## 2017-12-26 RX ADMIN — FENTANYL CITRATE 25 MCG: 50 INJECTION, SOLUTION INTRAMUSCULAR; INTRAVENOUS at 03:12

## 2017-12-26 RX ADMIN — HYDROMORPHONE HYDROCHLORIDE 0.5 MG: 1 INJECTION, SOLUTION INTRAMUSCULAR; INTRAVENOUS; SUBCUTANEOUS at 07:12

## 2017-12-26 RX ADMIN — NICARDIPINE HYDROCHLORIDE 2.5 MG/HR: 0.2 INJECTION, SOLUTION INTRAVENOUS at 03:12

## 2017-12-26 RX ADMIN — AMPICILLIN SODIUM AND SULBACTAM SODIUM 3 G: 2; 1 INJECTION, POWDER, FOR SOLUTION INTRAMUSCULAR; INTRAVENOUS at 01:12

## 2017-12-26 RX ADMIN — NICARDIPINE HYDROCHLORIDE 2.5 MG/HR: 0.2 INJECTION INTRAVENOUS at 03:12

## 2017-12-26 RX ADMIN — Medication 1000 MG: at 10:12

## 2017-12-26 RX ADMIN — AMPICILLIN SODIUM AND SULBACTAM SODIUM 3 G: 2; 1 INJECTION, POWDER, FOR SOLUTION INTRAMUSCULAR; INTRAVENOUS at 10:12

## 2017-12-26 RX ADMIN — ALBUMIN (HUMAN) 25 G: 12.5 SOLUTION INTRAVENOUS at 11:12

## 2017-12-26 RX ADMIN — PHYTONADIONE 10 MG: 10 INJECTION, EMULSION INTRAMUSCULAR; INTRAVENOUS; SUBCUTANEOUS at 10:12

## 2017-12-26 RX ADMIN — ROCURONIUM BROMIDE 10 MG: 10 INJECTION, SOLUTION INTRAVENOUS at 10:12

## 2017-12-26 RX ADMIN — FUROSEMIDE 62 MG: 10 INJECTION, SOLUTION INTRAMUSCULAR; INTRAVENOUS at 12:12

## 2017-12-26 RX ADMIN — FENTANYL CITRATE 100 MCG: 50 INJECTION, SOLUTION INTRAMUSCULAR; INTRAVENOUS at 12:12

## 2017-12-26 RX ADMIN — HEPARIN SODIUM 5000 UNITS: 5000 INJECTION, SOLUTION INTRAVENOUS; SUBCUTANEOUS at 10:12

## 2017-12-26 RX ADMIN — MANNITOL 62 ML: 250 INJECTION, SOLUTION INTRAVENOUS at 10:12

## 2017-12-26 RX ADMIN — PHYTONADIONE 10 MG: 10 INJECTION, EMULSION INTRAMUSCULAR; INTRAVENOUS; SUBCUTANEOUS at 03:12

## 2017-12-26 RX ADMIN — FAMOTIDINE 20 MG: 10 INJECTION, SOLUTION INTRAVENOUS at 10:12

## 2017-12-26 RX ADMIN — FENTANYL CITRATE 100 MCG: 50 INJECTION, SOLUTION INTRAMUSCULAR; INTRAVENOUS at 01:12

## 2017-12-26 RX ADMIN — VASOPRESSIN 2 UNITS: 20 INJECTION, SOLUTION INTRAMUSCULAR; SUBCUTANEOUS at 11:12

## 2017-12-26 RX ADMIN — NYSTATIN 500000 UNITS: 500000 SUSPENSION ORAL at 05:12

## 2017-12-26 RX ADMIN — PHENYLEPHRINE HYDROCHLORIDE 0.25 MCG/KG/MIN: 10 INJECTION INTRAVENOUS at 10:12

## 2017-12-26 RX ADMIN — PHENYLEPHRINE HYDROCHLORIDE 200 MCG: 10 INJECTION INTRAVENOUS at 08:12

## 2017-12-26 RX ADMIN — ALBUMIN HUMAN 25 G: 50 SOLUTION INTRAVENOUS at 06:12

## 2017-12-26 RX ADMIN — DEXTROSE AND SODIUM CHLORIDE: 5; .45 INJECTION, SOLUTION INTRAVENOUS at 02:12

## 2017-12-26 RX ADMIN — HEPARIN SODIUM 5000 UNITS: 5000 INJECTION, SOLUTION INTRAVENOUS; SUBCUTANEOUS at 03:12

## 2017-12-26 RX ADMIN — SODIUM CHLORIDE: 0.9 INJECTION, SOLUTION INTRAVENOUS at 08:12

## 2017-12-26 RX ADMIN — CALCIUM CHLORIDE 1000 MG: 100 INJECTION, SOLUTION INTRAVENOUS at 10:12

## 2017-12-26 RX ADMIN — PROPOFOL 150 MG: 10 INJECTION, EMULSION INTRAVENOUS at 08:12

## 2017-12-26 RX ADMIN — FUROSEMIDE 62 MG: 10 INJECTION, SOLUTION INTRAMUSCULAR; INTRAVENOUS at 10:12

## 2017-12-26 RX ADMIN — HEPARIN SODIUM 10000 UNITS: 1000 INJECTION, SOLUTION INTRAVENOUS; SUBCUTANEOUS at 11:12

## 2017-12-26 RX ADMIN — ALBUMIN (HUMAN) 25 G: 12.5 SOLUTION INTRAVENOUS at 06:12

## 2017-12-26 RX ADMIN — HYDROMORPHONE HYDROCHLORIDE 0.5 MG: 1 INJECTION, SOLUTION INTRAMUSCULAR; INTRAVENOUS; SUBCUTANEOUS at 11:12

## 2017-12-26 RX ADMIN — PHENYLEPHRINE HYDROCHLORIDE 300 MCG: 10 INJECTION INTRAVENOUS at 08:12

## 2017-12-26 RX ADMIN — ALBUMIN (HUMAN) 25 G: 12.5 SOLUTION INTRAVENOUS at 04:12

## 2017-12-26 RX ADMIN — CEFTRIAXONE SODIUM 1 G: 1 INJECTION, POWDER, FOR SOLUTION INTRAMUSCULAR; INTRAVENOUS at 06:12

## 2017-12-26 RX ADMIN — PROPOFOL 10 MCG/KG/MIN: 10 INJECTION, EMULSION INTRAVENOUS at 10:12

## 2017-12-26 RX ADMIN — PHENYLEPHRINE HYDROCHLORIDE 200 MCG: 10 INJECTION INTRAVENOUS at 09:12

## 2017-12-26 RX ADMIN — LIDOCAINE HYDROCHLORIDE 5 ML: 40 INJECTION, SOLUTION RETROBULBAR; TOPICAL at 12:12

## 2017-12-26 RX ADMIN — SODIUM CHLORIDE 2 UNITS/HR: 9 INJECTION, SOLUTION INTRAVENOUS at 12:12

## 2017-12-26 RX ADMIN — HEPARIN SODIUM 1500 UNITS: 1000 INJECTION, SOLUTION INTRAVENOUS; SUBCUTANEOUS at 11:12

## 2017-12-26 RX ADMIN — LIDOCAINE HYDROCHLORIDE 100 MG: 20 INJECTION, SOLUTION INTRAVENOUS at 08:12

## 2017-12-26 RX ADMIN — POTASSIUM CHLORIDE 40 MEQ: 1500 TABLET, EXTENDED RELEASE ORAL at 12:12

## 2017-12-26 RX ADMIN — FENTANYL CITRATE 100 MCG: 50 INJECTION, SOLUTION INTRAMUSCULAR; INTRAVENOUS at 08:12

## 2017-12-26 RX ADMIN — FLUCONAZOLE 400 MG: 2 INJECTION INTRAVENOUS at 03:12

## 2017-12-26 RX ADMIN — VASOPRESSIN 2 UNITS/HR: 20 INJECTION INTRAVENOUS at 10:12

## 2017-12-26 NOTE — OP NOTE
Certification of Assistant at Surgery       Surgery Date: 12/26/2017     Participating Surgeons:  Surgeon(s) and Role:     * Romeo Moore MD - Primary     * Jarrod Regan MD - Assisting     * Yeison Vora MD - Fellow    Procedures:  Procedure(s) (LRB):  TRANSPLANT-LIVER (N/A)    Assistant Surgeon's Certification of Necessity:  I understand that section 1842 (b) (6) (d) of the Social Security Act generally prohibits Medicare Part B reasonable charge payment for the services of assistants at surgery in [facility] hospitals when qualified residents are available to furnish such services. I certify that the services for which payment is claimed were medically necessary, and that no qualified resident was available to perform the services. I further understand that these services are subject to post-payment review by the Medicare carrier.      Jarrod Regan MD    12/26/2017  1:32 PM

## 2017-12-26 NOTE — ASSESSMENT & PLAN NOTE
BG goal 140-180. Continue intensive insulin drip protocol with BG q1h.     Discharge planning is ongoing.

## 2017-12-26 NOTE — OP NOTE
Operative Report    Date of Procedure: 12/26/2017    Surgeons:  Surgeon(s) and Role:     * Romeo Moore MD - Primary     * Jarrod Regan MD - Assisting     * Yeison Vora MD - Fellow    First Assistant Attestation:  The presence of an additional attending surgeon functioning as first assistant was required due to the complexity of the procedure relative to any available residents. I certify that no resident was available who was qualified to serve as first assistant. Duties performed by the assistant included assisting the primary surgeon.    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 year-old female with Alcoholic Cirrhosis, Cirrhosis: Fatty Liver ([place]) (UNOS terminology) who has been evaluated for a liver transplant.  The procedure was thoroughly discussed with the patient, including potential risks, complications, and alternatives. Specific complications mentioned included death, graft non-function, bleeding, infection, rejection, renal failure, respiratory failure, and neurologic deficits, as well as the possibility of other complications not specifically mentioned.    Donor Risk Factors:  Prior to the operation, the patient was advised of any donor-specific risk factors requiring specific disclosure. Factors in this case included nothing that required specific disclosure.      Specific PHS Increased Risk Behavior criteria for the organ donor include:  None    All  questions were answered, the patient voiced appropriate understanding, and she agreed to proceed with the planned procedure.    ABO Confirmation: Immediately following arrival of the donor organ and prior to implantation, a formal ABO confirmation was done according to hospital and UNOS policies.  I confirmed the UNOS ID number of the donor organ ([id number]) and the donor and recipient ABO types, directly verifying these data by comparison with the UNOS Match Run report ([id number].  This confirmation was personally done by an attending surgeon and circulating nurse, and is officially documented elsewhere.    Time-Out: A complete time out was carried out prior to incision, with confirmation of patient identity, correct procedure, correct operative site, appropriate antibiotic prophylaxis, review of any known allergies, and presence of all needed equipment.    Procedure in Detail  Following the induction of general endotracheal anesthesia, appropriate arterial and venous lines were placed by the anesthesia team.  Care was taken to pad all pressure points and avoid any potential traction injuries from positioning. The urinary bladder was catheterized.  Sequential compression boots and Chanel Huggers were used. The chest, abdomen, and upper thighs were sterilely prepped and draped.     The abdomen was entered via a wide bilateral subcostal incision. 3000 mL of ascites was removed. The round ligament was ligated and divided. The falciform, left triangular, and gastrohepatic ligaments were divided using the electocautery, with 2-0 silk ties as needed. The Aaron self-retaining retractor was placed to provide exposure. Adhesions between the abdominal viscera and the abdominal wall and the undersurface of the liver were divided as needed using cautery dissection and silk ties or suture ligatures. Hilar dissection was then carried out, with ligation of the right and left hepatic arteries as well as the cystic duct and the common  hepatic duct. The recipient arterial anatomy was found to be: replaced right hepatic from sma. The portal vein was exposed circumferentially from its bifurcation down to the superior border of the pancreas. The portal vein was found to be patent.  Attention was next directed to the right side of the liver where the right triangular ligament was taken down, exposing the bare area of the liver, and the IVC. The infrahepatic IVC was isolated, followed by mobilization of the retrohepatic cava, division of the right adrenal vein, and isolation of the suprahepatic IVC. The patient was given 2000 units of heparin prior to caval cross-clamping. . After assuring adequate stability after cross-clamping, the native liver was excised and the allograft liver was brought to the operative field.  The liver was perfused with cold 5% albumin during implantation to displace the organ preservation solution.  IVC reconstruction technique consisted of cavaplasty piggyback using 3-0 and 4-0 polypropylene as appropriate.  The donor portal vein was then anastomosed to the recipient portal inflow site (end portal vein) with 5-0 polypropylene. Once this was completed, anesthesia was notified and the liver was re-perfused. Copious irrigation with warm saline and temporary finger occlusion of portal inflow were used as needed to avoid any problems with hypothermia. Temporary packing, electocautery, and polypropylene suture ligatures were used as appropriate to provide hemostasis. Once this was satisfactory, attention was directed to the arterial reconstruction. This liver did not come from a DCD donor. Arterial inflow was provided to the graft by anastomosing the recipient proper hepatic artery to the donor  common hepatic artery using 6-0 polypropylene. The liver was assessed for adequacy of perfusion, which was satisfactory. The gallbladder was then removed from the allograft liver, and a temporary packing period was carried out to help  assure hemostatis.  Attention was next directed toward the bile duct. The donor bile duct was prepared and assessed for adequate vascular supply. Biliary reconstruction was then performed by anastomosing the recipient common hepatic duct to the donor duct using 6-0 PDS sutures.  The biliary stent used was: none.  A final check for hemostasis was made. 2 19f George drains were placed through separate incisions below the transverse abdominal incision and placed in the usual positions. The cavity was irrigated with saline. The abdomen was closed in layers using running #1 PDS suture. The incision was irrigated and the skin was closed with staples. At the end of the case all instrument, needle, and sponge counts were correct. The patient was taken to the ICU in good condition.     Fluids Administered:   Crystalloid (mL) 2700   RBC (Units) 2   FFP (Units) 4   Cryoprecipitate (Units)  1   Platelets (Units) 1   Cell Saver (CCs) 362      Specimens: Explant liver  Drains: 19f George drains x 2    Additional Findings:    Estimated Blood Loss: 1600 mL  Ascites Evacuated: 3000 mL    Ischemic Times:  Time of portal reperfusion: 12/26/2017 11:40 AM  Time of arterial reperfusion: 12/26/2017 12:07 PM  Anastomosis (warm ischemia) time: 24 minutes  Cold ischemia time: 345 minutes    Surgical Data:  Graft type (whole vs. partial): whole liver  IVC reconstruction: cavaplasty piggyback  Portal vein status: patent  Portal graft performed? no  Donor arterial anatomy: standard  Donor arterial inflow: common hepatic artery  Recipient arterial anatomy: replaced right hepatic from sma  Recipient arterial inflow: proper hepatic artery  Arterial graft performed? no  Biliary reconstruction: end to end (donor common hepatic duct to recipient common hepatic duct)  Biliary stent: none  Disposition of Vessels from donor of transplanted organ: sent to blood bank  Damage during procurement: no  Procurement damage comments: none    Donor Factors:  UNOS  ID: )[id number]  UNOS Match Run: [id number]  Donor type:  - brain death  Donor with reported PHS increased risk behavior: no  Donor CMV serologic status: Negative  Donor with known cancer: no  Donor HCV serologic status: Negative  Donor HBcAb: Negative  Liver weight: 1117 grams

## 2017-12-26 NOTE — PROGRESS NOTES
KARLA received a call from Viola stating she will be at the pts bedside in the morning to meet with KARLA. KARLA remains available.

## 2017-12-26 NOTE — ANESTHESIA PROCEDURE NOTES
Arterial    Diagnosis: ESLD    Patient location during procedure: done in OR  Procedure start time: 12/26/2017 8:51 AM  Procedure end time: 12/26/2017 8:54 AM  Staffing  Anesthesiologist: PATY BERUMEN JR  Performed: anesthesiologist   Anesthesiologist was present at the time of the procedure.  Preanesthetic Checklist  Completed: patient identified, site marked, surgical consent, pre-op evaluation, timeout performed, IV checked, risks and benefits discussed, monitors and equipment checked and anesthesia consent givenArterial  Skin Prep: chlorhexidine gluconate  Local Infiltration: none  Orientation: right  Location: femoral  Catheter Size: 20 G  Catheter placement by Anatomical landmarks. Heme positive aspiration all ports.Insertion Attempts: 1  Assessment  Dressing: sutured in place and taped  Patient: Tolerated well

## 2017-12-26 NOTE — PROGRESS NOTES
KARLA received a call from the son of the patient Duarte # [phone] who reported he was unaware the patient has already went into surgery and wanted to follow up with KARLA.  KARLA inquired when will someone present to be with the pt and Duarte reported he will present shortly from [place] and asked KARLA for instructions.  KARLA directed him to the second floor surgery center waiting room.  SW remains available.      SW to complete transplant note and update once pts family present.

## 2017-12-26 NOTE — ANESTHESIA PROCEDURE NOTES
Central Line    Diagnosis: ESLD  Patient location during procedure: done in OR  Procedure start time: 12/26/2017 8:27 AM  Timeout: 12/26/2017 8:26 AM  Procedure end time: 12/26/2017 8:50 AM  Staffing  Anesthesiologist: PATY BERUMEN JR  Performed: anesthesiologist   Anesthesiologist was present at the time of the procedure.  Preanesthetic Checklist  Completed: patient identified, site marked, surgical consent, pre-op evaluation, timeout performed, IV checked, risks and benefits discussed, monitors and equipment checked and anesthesia consent given  Indication  Indication: vascular access, med administration     Anesthesia   general anesthesia    Central Line  Skin Prep: skin prepped with ChloraPrep, skin prep agent completely dried prior to procedure  maximum sterile barriers used during central venous catheter insertion  hand hygiene performed prior to central venous catheter insertion  Location: right internal jugular,   Catheter type: triple lumen  Catheter Size: 12 Fr  Ultrasound: none   Manometry: Venous cannualation confirmed by visual estimation of blood vessel pressure using manometry.  Insertion Attempts: 1   Securement:line sutured, chlorhexidine patch, sterile dressing applied and blood return through all ports     Post-Procedure  Adverse Events:none

## 2017-12-26 NOTE — NURSING
Pt's daughter, Viola, called back inquiring on earlier message about pt to O.R.  States her brother is in town and will be coming to wait during surgery.  Viola asked where her brother should go.  Rn stated he could ask at information desk or come up to pt's room and staff would direct him to waiting area.

## 2017-12-26 NOTE — ANESTHESIA PROCEDURE NOTES
[place] [facility]    Diagnosis: ESLD  Patient location during procedure: done in OR  Procedure start time: 12/26/2017 8:27 AM  Timeout: 12/26/2017 8:26 AM  Procedure end time: 12/26/2017 8:50 AM  Staffing  Anesthesiologist: PATY BERUMEN JR  Performed: anesthesiologist   Anesthesiologist was present at the time of the procedure.  Preanesthetic Checklist  Completed: patient identified, site marked, surgical consent, pre-op evaluation, timeout performed, IV checked, risks and benefits discussed, monitors and equipment checked and anesthesia consent given  [place] Line  Skin Prep: chlorhexidine gluconate  Local Infiltration: none  Location: right,  internal jugular vein  Introducer: 9 Fr single lumen (double), manometry used.  Device: CCO/Oximetric Catheter  Catheter Size: 8.5 Fr  Catheter placement by emelia Ralph positive aspiration all ports. PAC floated with balloon up until wedgedSterile sheath used  Locked at: 45 cm.Insertion Attempts: 1  Indication: intravenous therapy, hemodynamic monitoring  Assessment  Central Line Bundle Protocol followed. Hand hygiene before procedure, surgical cap worn, surgical mask worn, sterile surgical gloves worn, large sterile drape used.  Verification: blood return  Dressing: sutured in place and taped and tegaderm  Patient: Tolerated Well

## 2017-12-26 NOTE — NURSING
Anesthesiologist here to take pt to O.R. Pt gave her daughter , Viola's, number for updates. Pt ambulated to cart with FWW without difficulty, belongings packed and accounted for.  RN called pt's daughter, left voicemail updating her on pt to OR

## 2017-12-26 NOTE — ANESTHESIA PREPROCEDURE EVALUATION
"                                                                                                             12/26/2017  Marcie Bazan is a 52 y.o., female.    Anesthesia Evaluation    I have reviewed the Patient Summary Reports.    I have reviewed the Nursing Notes.   I have reviewed the Medications.     Review of Systems  Anesthesia Hx:  History of prior surgery of interest to airway management or planning: Denies Family Hx of Anesthesia complications.   Denies Personal Hx of Anesthesia complications.   Hematology/Oncology:     Oncology Normal    -- Anemia:   EENT/Dental:EENT/Dental Normal   Cardiovascular:   Exercise tolerance: good Hypertension, well controlled Denies CAD.        ECG has been reviewed. DSE 10/2017 CONCLUSIONS     1 - Hyperdynamic left ventricular systolic function (EF >70%).     2 - Normal right ventricular systolic function .     3 - Normal left ventricular diastolic function.     4 - The estimated PA systolic pressure is 21 mmHg.     No evidence of stress induced myocardial ischemia.    Pulmonary:  Pulmonary Normal    Renal/:   Severe hyponatremia   Hepatic/GI:   PUD, GERD, well controlled Liver Disease, Hepatitis    Musculoskeletal:  Spine Disorders: lumbar    Neurological:   Hepatic encephalopathy, controlled with lactulose  Peripheral Neuropathy    Endocrine:  Endocrine Normal    Psych:  Psychiatric Normal           Physical Exam  General:  Cachexia    Airway/Jaw/Neck:  Airway Findings: Mouth Opening: Small, but > 3cm Tongue: Normal  General Airway Assessment: Adult  Mallampati: III  TM Distance: Normal, at least 6 cm        Eyes/Ears/Nose:  EYES/EARS/NOSE FINDINGS: Normal   Dental:  Dental Findings: In tact   Chest/Lungs:  Chest/Lungs Clear    Heart/Vascular:  Heart Findings: Normal Heart murmur: negative Vascular Findings: Normal    Abdomen:  Abdomen Findings:  Ascites     Musculoskeletal:  Musculoskeletal Findings: "numbness left leg"   Skin:  Skin Findings: Normal    Mental " Status:  Mental Status Findings:  Cooperative, Alert and Oriented, Somnolent         Anesthesia Plan  Type of Anesthesia, risks & benefits discussed:  Anesthesia Type:  general  Patient's Preference:   Intra-op Monitoring Plan: arterial line, central line, [place], cardiac output and standard ASA monitors  Intra-op Monitoring Plan Comments:   Post Op Pain Control Plan: per primary service following discharge from PACU  Post Op Pain Control Plan Comments:   Induction:   IV  Beta Blocker:  Patient is not currently on a Beta-Blocker (No further documentation required).       Informed Consent: Patient understands risks and agrees with Anesthesia plan.  Questions answered. Anesthesia consent signed with patient.  ASA Score: 4     Day of Surgery Review of History & Physical:    H&P update referred to the surgeon.         Ready For Surgery From Anesthesia Perspective.

## 2017-12-26 NOTE — ANESTHESIA PROCEDURE NOTES
Arterial    Diagnosis: liver transplant  Doctor requesting consult: alis    Patient location during procedure: done in OR  Procedure start time: 12/26/2017 8:27 AM  Timeout: 12/26/2017 8:26 AM  Procedure end time: 12/26/2017 9:12 AM  Staffing  Anesthesiologist: PATY BERUMEN JR  Performed: anesthesiologist   Anesthesiologist was present at the time of the procedure.  Preanesthetic Checklist  Completed: patient identified, site marked, surgical consent, pre-op evaluation, timeout performed, IV checked, risks and benefits discussed, monitors and equipment checked and anesthesia consent givenArterial  Skin Prep: chlorhexidine gluconate  Local Infiltration: none  Orientation: right  Location: radial  Catheter Size: 20 G  Catheter placement by Anatomical landmarks. Heme positive aspiration all ports.Insertion Attempts: 4 or more  Assessment  Dressing: secured with tape and tegaderm  Patient: Tolerated well

## 2017-12-26 NOTE — TRANSFER OF CARE
"Anesthesia Transfer of Care Note    Patient: Marcie Bazan    Procedure(s) Performed: Procedure(s) (LRB):  TRANSPLANT-LIVER (N/A)    Patient location: ICU    Anesthesia Type: general    Transport from OR: Transported from OR intubated on 100% O2 by AMBU with adequate controlled ventilation. Upon arrival to PACU/ICU, patient attached to ventilator and auscultated to confirm bilateral breath sounds and adequate TV. Continuous ECG monitoring in transport. Continuous SpO2 monitoring in transport. Continuos invasive BP monitoring in transport    Post pain: adequate analgesia    Post assessment: no apparent anesthetic complications and tolerated procedure well    Post vital signs: stable    Level of consciousness: sedated    Nausea/Vomiting: no nausea/vomiting    Complications: none    Transfer of care protocol was followed      Last vitals:   Visit Vitals  BP (!) 94/54 (BP Location: Right arm, Patient Position: Lying)   Pulse 94   Temp 36.8 °C (98.2 °F) (Oral)   Resp 16   Ht 5' 5" (1.651 m)   Wt 62.3 kg (137 lb 5.6 oz)   LMP 01/01/1985 (Approximate)   SpO2 100%   Breastfeeding? No   BMI 22.86 kg/m²     "

## 2017-12-26 NOTE — PLAN OF CARE
Problem: Patient Care Overview  Goal: Plan of Care Review  Pt remains AAOx4, vital signs are stable. No complaints of pain overnight.  Pt was bathed with Hibiclens, FABIOLA hose and SCDs are on. Pt NPO since midnight. Call bell and personal belongings within reach. Plan of care reviewed with patient, all questions and concerns were addressed. Will continue to monitor.

## 2017-12-26 NOTE — PROGRESS NOTES
Saw patient in the ICU.  Patient still intubated. Patient's son, Duarte, at her bedside.  Gave Duarte discharge book- My New Journey: Living Smart After My Liver Transplant.  Asked him to read the book in preparation for education.  Allowed time for questions and answers.

## 2017-12-26 NOTE — OP NOTE
Operative Report    Date of Procedure: 12/26/2017    Surgeon: Jarrod Regan MD  First Assistant: None    Pre-operative Diagnosis: Allograft liver for transplantation  Post-operative Diagnosis: Same    Procedure(s) Performed:   1. Back Table Preparation of Liver with needle biopsy.    Anesthesia: Not applicable  Estimated Blood Loss: Not applicable  Fluids Administered: Not applicable    Findings: Estimated steatosis 0-10%, normal vascular anatomy.  Drains: Not applicable  Specimens: Core biopsy of allograft liver      Preamble  Indications: This report describes only the backbench preparation of the liver prior to transplantation.  The transplant operation itself is described in a separate report.    ABO Confirmation: Immediately following arrival of the donor organ and prior to implantation, a formal ABO confirmation was done according to hospital and UNOS policies.  I confirmed the UNOS ID number of the donor organ and the donor and recipient ABO types, directly verifying these data by comparison with the UNOS Match Run report.  This confirmation was personally done by an attending surgeon and circulating nurse, and is officially documented elsewhere.    Time-Out: A complete time out was carried out prior to the procedure, with confirmation of patient identity, correct procedure, correct operative site, appropriate antibiotic prophylaxis, review of any known allergies, and presence of all needed equipment.    Procedure in Detail  The liver was recovered from the transport cooler and inspected for vascular anatomy and overall suitability for transplantation, with findings as noted above.  The remnant of the diaphragm was dissected away.  The phrenic veins and adrenal vein were identified and ligated or sutured as appropriate.  The portal vein and hepatic artery were mobilized toward the hilum of the liver, and extrahepatic branches were ligated.  No vascular reconstruction was required.  The vessels were tested for  leaks.  A needle biopsy was obtained for permanent section histology using a spring-loaded biopsy device. The liver was kept in ice temperature organ preservation solution until the time of implantation.

## 2017-12-26 NOTE — SUBJECTIVE & OBJECTIVE
Intubated without pressor support. BG above goal on intensive insulin drip. Standard SM taper starting tomorrow/    PMH, PSH, FH, SH updated and reviewed     Review of Systems  Unable to obtain due to: intubated    Current Medications and/or Treatments Impacting Glycemic Control  Immunotherapy:    Immunosuppressants         Stop Route Frequency     mycophenolate mofetil 200 mg/mL suspension 1,000 mg      -- Oral 2 times daily     tacrolimus (PROGRAF) 1 mg/mL oral syringe      -- Oral 2 times daily        Steroids:   Hormones     Start     Stop Route Frequency Ordered    01/01/18 0900  predniSONE tablet 20 mg  (methylprednisolone taper panel)      -- Oral Daily 12/26/17 1425    12/31/17 0900  methylPREDNISolone sodium succinate injection 20 mg  (methylprednisolone taper panel)      01/01 0859 IV Every 12 hours 12/26/17 1425    12/30/17 0900  methylPREDNISolone sodium succinate injection 40 mg  (methylprednisolone taper panel)      12/31 0859 IV Every 12 hours 12/26/17 1425    12/29/17 0900  methylPREDNISolone sodium succinate injection 60 mg  (methylprednisolone taper panel)      12/30 0859 IV Every 12 hours 12/26/17 1425    12/28/17 0900  methylPREDNISolone sodium succinate injection 80 mg  (methylprednisolone taper panel)      12/29 0859 IV Every 12 hours 12/26/17 1425    12/27/17 0900  methylPREDNISolone sodium succinate injection 100 mg  (methylprednisolone taper panel)      12/28 0859 IV Every 12 hours 12/26/17 1425        Pressors:    Autonomic Drugs     None        Hyperglycemia/Diabetes Medications:   Antihyperglycemics     None        PHYSICAL EXAMINATION:  Vitals:    12/26/17 1515   BP:    Pulse: (!) 121   Resp: 16   Temp:      Body mass index is 22.86 kg/m².    Physical Exam   Constitutional:  Well developed, well nourished, NAD.  ENT: External ears no masses with nose patent; normal hearing.   Neck:  Supple; trachea midline  Cardiovascular: Normal heart sounds, no LE edema.     Lungs: +intubated,  tolerating mechanical ventilation.   Abdomen: +mild distention, + hypoactive bowel, no masses.  MS: No clubbing or cyanosis of nails noted; unable to assess gait.  Skin: No rashes, lesions, or ulcers; no nodules.  Psychiatric: CLARICE  Neurological: CLARICE

## 2017-12-26 NOTE — HPI
Reason for Consult: Management ofHyperglycemia     Surgical Procedure and Date: OLT 12/26/2017    HPI:   Patient is a 52 y.o. female with ESLD 2/2 decompensated alcoholic hepatitis who is now s/p liver transplant. Endocrine consulted for BG management. No personal h/o DM, + FH in father and sister per Epic review.

## 2017-12-26 NOTE — CONSULTS
[facility]  Endocrinology  Diabetes Consult Note    Consult Requested by: Harry Delarosa MD   Reason for admit: S/P liver transplant    HISTORY OF PRESENT ILLNESS:  Reason for Consult: Management ofHyperglycemia     Surgical Procedure and Date: OLT 12/26/2017    HPI:   Patient is a 52 y.o. female with ESLD 2/2 decompensated alcoholic hepatitis who is now s/p liver transplant. Endocrine consulted for BG management. No personal h/o DM, + FH in father and sister per Epic review.     Intubated without pressor support. BG above goal on intensive insulin drip. Standard SM taper starting tomorrow/    PMH, PSH, FH, SH updated and reviewed     Review of Systems  Unable to obtain due to: intubated    Current Medications and/or Treatments Impacting Glycemic Control  Immunotherapy:    Immunosuppressants         Stop Route Frequency     mycophenolate mofetil 200 mg/mL suspension 1,000 mg      -- Oral 2 times daily     tacrolimus (PROGRAF) 1 mg/mL oral syringe      -- Oral 2 times daily        Steroids:   Hormones     Start     Stop Route Frequency Ordered    01/01/18 0900  predniSONE tablet 20 mg  (methylprednisolone taper panel)      -- Oral Daily 12/26/17 1425    12/31/17 0900  methylPREDNISolone sodium succinate injection 20 mg  (methylprednisolone taper panel)      01/01 0859 IV Every 12 hours 12/26/17 1425    12/30/17 0900  methylPREDNISolone sodium succinate injection 40 mg  (methylprednisolone taper panel)      12/31 0859 IV Every 12 hours 12/26/17 1425    12/29/17 0900  methylPREDNISolone sodium succinate injection 60 mg  (methylprednisolone taper panel)      12/30 0859 IV Every 12 hours 12/26/17 1425    12/28/17 0900  methylPREDNISolone sodium succinate injection 80 mg  (methylprednisolone taper panel)      12/29 0859 IV Every 12 hours 12/26/17 1425    12/27/17 0900  methylPREDNISolone sodium succinate injection 100 mg  (methylprednisolone taper panel)      12/28 0859 IV Every 12 hours 12/26/17 1425         Pressors:    Autonomic Drugs     None        Hyperglycemia/Diabetes Medications:   Antihyperglycemics     None        PHYSICAL EXAMINATION:  Vitals:    12/26/17 1515   BP:    Pulse: (!) 121   Resp: 16   Temp:      Body mass index is 22.86 kg/m².    Physical Exam   Constitutional:  Well developed, well nourished, NAD.  ENT: External ears no masses with nose patent; normal hearing.   Neck:  Supple; trachea midline  Cardiovascular: Normal heart sounds, no LE edema.     Lungs: +intubated, tolerating mechanical ventilation.   Abdomen: +mild distention, + hypoactive bowel, no masses.  MS: No clubbing or cyanosis of nails noted; unable to assess gait.  Skin: No rashes, lesions, or ulcers; no nodules.  Psychiatric: CLARICE  Neurological: CLARICE      Labs Reviewed and Include     Recent Labs  Lab 12/26/17  1437   *  188*   CALCIUM 8.0*  8.0*   ALBUMIN 2.7*   PROT 5.1*   *  134*   K 3.4*  3.4*   CO2 20*  20*     106   BUN 18  18   CREATININE 1.0  1.0   ALKPHOS 151*   *   *  314*   BILITOT 12.3*     Lab Results   Component Value Date    WBC 9.99 12/26/2017    HGB 10.2 (L) 12/26/2017    HCT 28.5 (L) 12/26/2017    MCV 87 12/26/2017    PLT 93 (L) 12/26/2017     No results for input(s): TSH, FREET4 in the last 168 hours.  Lab Results   Component Value Date    HGBA1C <4.0 (A) 10/20/2017       Nutritional status:   Body mass index is 22.86 kg/m².  Lab Results   Component Value Date    ALBUMIN 2.7 (L) 12/26/2017    ALBUMIN 2.7 (L) 12/26/2017    ALBUMIN 2.5 (L) 12/26/2017     Lab Results   Component Value Date    PREALBUMIN 4 (L) 11/27/2017    PREALBUMIN 3 (L) 10/20/2017       Estimated Creatinine Clearance: 57.9 mL/min (based on SCr of 1 mg/dL).    Accu-Checks  No results for input(s): POCTGLUCOSE in the last 72 hours.     ASSESSMENT and PLAN    * Liver transplant 12/26/2017 for ETOH    Avoid hypoglycemia.        Hyperglycemia    BG goal 140-180. Continue intensive insulin drip protocol with  BG q1h.     Discharge planning is ongoing.        Adverse effect of adrenal cortical steroids    Standard SM taper beginning 12/27/2017.  Increases BG, mostly prandial needs.           Immunosuppression    Increases insulin resistance over time.            ODILIA Rader  Endocrinology  [facility]

## 2017-12-26 NOTE — PROGRESS NOTES
SW received notice stating that the pt is currently is the OR for Liver Transplant.  SW contact the dtr of the patient Viola # [phone].  There was no answer and SW could not leave a voice message.  SW contact Patricia Bazan sister of the patient # [phone].  Patricia reported that she was not aware the patient was currently in the OR.  Patricia reported she was notified that the patient received an offer last night but was not sure of the time.  SW inquired who would be present with the patient while she is in surgery and when will they arrive.  The sister of the patient Patricia reported the someone will present but she is not sure who as they've experienced a death in the family and the patient is unaware.  Patricia reported she will have the patient's dtr call me.  SW notified Patricia that this SW attempt to contact the dtr of the patient but there was no answer.  This SW left telephone number and contact information.  Patient's sister verbalized understanding and agreement with the information reviewed, social work availability, and how to access available resources if needed. SW remains available.

## 2017-12-27 LAB
25(OH)D3+25(OH)D2 SERPL-MCNC: 6 NG/ML
ALBUMIN SERPL BCP-MCNC: 4.1 G/DL
ALLENS TEST: ABNORMAL
ALP SERPL-CCNC: 93 U/L
ALT SERPL W/O P-5'-P-CCNC: 215 U/L
ANION GAP SERPL CALC-SCNC: 10 MMOL/L
ANION GAP SERPL CALC-SCNC: 11 MMOL/L
ANION GAP SERPL CALC-SCNC: 9 MMOL/L
ANISOCYTOSIS BLD QL SMEAR: SLIGHT
APTT BLDCRRT: 44.8 SEC
AST SERPL-CCNC: 137 U/L
AST SERPL-CCNC: 225 U/L
AST SERPL-CCNC: 288 U/L
AST SERPL-CCNC: 288 U/L
AST SERPL-CCNC: 91 U/L
BASOPHILS # BLD AUTO: 0.01 K/UL
BASOPHILS # BLD AUTO: 0.02 K/UL
BASOPHILS NFR BLD: 0.1 %
BASOPHILS NFR BLD: 0.2 %
BASOPHILS NFR BLD: 0.2 %
BILIRUB DIRECT SERPL-MCNC: 2.9 MG/DL
BILIRUB SERPL-MCNC: 4.9 MG/DL
BLD PROD TYP BPU: NORMAL
BLD PROD TYP BPU: NORMAL
BLOOD UNIT EXPIRATION DATE: NORMAL
BLOOD UNIT EXPIRATION DATE: NORMAL
BLOOD UNIT TYPE CODE: 6200
BLOOD UNIT TYPE CODE: 6200
BLOOD UNIT TYPE: NORMAL
BLOOD UNIT TYPE: NORMAL
BUN SERPL-MCNC: 23 MG/DL
BUN SERPL-MCNC: 25 MG/DL
BUN SERPL-MCNC: 28 MG/DL
CALCIUM SERPL-MCNC: 8 MG/DL
CHLORIDE SERPL-SCNC: 108 MMOL/L
CHLORIDE SERPL-SCNC: 109 MMOL/L
CHLORIDE SERPL-SCNC: 109 MMOL/L
CMV DNA SERPL NAA+PROBE-ACNC: NORMAL IU/ML
CO2 SERPL-SCNC: 16 MMOL/L
CO2 SERPL-SCNC: 17 MMOL/L
CO2 SERPL-SCNC: 17 MMOL/L
CODING SYSTEM: NORMAL
CODING SYSTEM: NORMAL
CREAT SERPL-MCNC: 1.3 MG/DL
CREAT SERPL-MCNC: 1.4 MG/DL
CREAT SERPL-MCNC: 1.6 MG/DL
DELSYS: ABNORMAL
DIFFERENTIAL METHOD: ABNORMAL
DISPENSE STATUS: NORMAL
DISPENSE STATUS: NORMAL
EOSINOPHIL # BLD AUTO: 0 K/UL
EOSINOPHIL NFR BLD: 0 %
ERYTHROCYTE [DISTWIDTH] IN BLOOD BY AUTOMATED COUNT: 16.2 %
ERYTHROCYTE [DISTWIDTH] IN BLOOD BY AUTOMATED COUNT: 16.3 %
ERYTHROCYTE [DISTWIDTH] IN BLOOD BY AUTOMATED COUNT: 16.3 %
ERYTHROCYTE [DISTWIDTH] IN BLOOD BY AUTOMATED COUNT: 16.5 %
ERYTHROCYTE [DISTWIDTH] IN BLOOD BY AUTOMATED COUNT: 16.6 %
ERYTHROCYTE [SEDIMENTATION RATE] IN BLOOD BY WESTERGREN METHOD: 12 MM/H
ERYTHROCYTE [SEDIMENTATION RATE] IN BLOOD BY WESTERGREN METHOD: 16 MM/H
EST. GFR  (AFRICAN AMERICAN): 41.8 ML/MIN/1.73 M^2
EST. GFR  (AFRICAN AMERICAN): 49.1 ML/MIN/1.73 M^2
EST. GFR  (AFRICAN AMERICAN): 53.7 ML/MIN/1.73 M^2
EST. GFR  (NON AFRICAN AMERICAN): 36.3 ML/MIN/1.73 M^2
EST. GFR  (NON AFRICAN AMERICAN): 42.6 ML/MIN/1.73 M^2
EST. GFR  (NON AFRICAN AMERICAN): 46.6 ML/MIN/1.73 M^2
FIO2: 40
FIO2: 40
FLOW: 2
GGT SERPL-CCNC: 60 U/L
GLUCOSE SERPL-MCNC: 151 MG/DL
GLUCOSE SERPL-MCNC: 158 MG/DL
GLUCOSE SERPL-MCNC: 178 MG/DL
HCO3 UR-SCNC: 15.7 MMOL/L (ref 24–28)
HCO3 UR-SCNC: 16.7 MMOL/L (ref 24–28)
HCO3 UR-SCNC: 17 MMOL/L (ref 24–28)
HCO3 UR-SCNC: 17.4 MMOL/L (ref 24–28)
HCT VFR BLD AUTO: 22.6 %
HCT VFR BLD AUTO: 23.9 %
HCT VFR BLD AUTO: 24.1 %
HCT VFR BLD AUTO: 24.3 %
HCT VFR BLD AUTO: 24.5 %
HEMOGLOBIN A1C REFERRAL TEST: 4.2 % (ref 4–5.6)
HGB BLD-MCNC: 8.3 G/DL
HGB BLD-MCNC: 8.6 G/DL
HGB BLD-MCNC: 8.7 G/DL
HGB BLD-MCNC: 8.8 G/DL
HGB BLD-MCNC: 8.9 G/DL
IMM GRANULOCYTES # BLD AUTO: 0.04 K/UL
IMM GRANULOCYTES # BLD AUTO: 0.05 K/UL
IMM GRANULOCYTES # BLD AUTO: 0.08 K/UL
IMM GRANULOCYTES # BLD AUTO: 0.1 K/UL
IMM GRANULOCYTES # BLD AUTO: 0.11 K/UL
IMM GRANULOCYTES NFR BLD AUTO: 0.3 %
IMM GRANULOCYTES NFR BLD AUTO: 0.5 %
IMM GRANULOCYTES NFR BLD AUTO: 0.6 %
IMM GRANULOCYTES NFR BLD AUTO: 0.6 %
IMM GRANULOCYTES NFR BLD AUTO: 0.7 %
INR PPP: 1.3
INR PPP: 1.3
INR PPP: 1.4
INR PPP: 1.4
LACTATE SERPL-SCNC: 0.8 MMOL/L
LYMPHOCYTES # BLD AUTO: 0.5 K/UL
LYMPHOCYTES # BLD AUTO: 0.6 K/UL
LYMPHOCYTES # BLD AUTO: 0.6 K/UL
LYMPHOCYTES # BLD AUTO: 0.8 K/UL
LYMPHOCYTES # BLD AUTO: 0.9 K/UL
LYMPHOCYTES NFR BLD: 4.7 %
LYMPHOCYTES NFR BLD: 4.8 %
LYMPHOCYTES NFR BLD: 5 %
LYMPHOCYTES NFR BLD: 5.4 %
LYMPHOCYTES NFR BLD: 5.6 %
MAGNESIUM SERPL-MCNC: 1.8 MG/DL
MCH RBC QN AUTO: 30.7 PG
MCH RBC QN AUTO: 31 PG
MCH RBC QN AUTO: 31.1 PG
MCH RBC QN AUTO: 31.2 PG
MCH RBC QN AUTO: 31.6 PG
MCHC RBC AUTO-ENTMCNC: 36 G/DL
MCHC RBC AUTO-ENTMCNC: 36.1 G/DL
MCHC RBC AUTO-ENTMCNC: 36.2 G/DL
MCHC RBC AUTO-ENTMCNC: 36.3 G/DL
MCHC RBC AUTO-ENTMCNC: 36.7 G/DL
MCV RBC AUTO: 85 FL
MCV RBC AUTO: 85 FL
MCV RBC AUTO: 86 FL
MODE: ABNORMAL
MONOCYTES # BLD AUTO: 0.2 K/UL
MONOCYTES # BLD AUTO: 0.2 K/UL
MONOCYTES # BLD AUTO: 0.3 K/UL
MONOCYTES # BLD AUTO: 0.5 K/UL
MONOCYTES # BLD AUTO: 0.5 K/UL
MONOCYTES NFR BLD: 1.5 %
MONOCYTES NFR BLD: 1.7 %
MONOCYTES NFR BLD: 1.9 %
MONOCYTES NFR BLD: 3.2 %
MONOCYTES NFR BLD: 3.2 %
NEUTROPHILS # BLD AUTO: 10.2 K/UL
NEUTROPHILS # BLD AUTO: 10.6 K/UL
NEUTROPHILS # BLD AUTO: 12 K/UL
NEUTROPHILS # BLD AUTO: 13.6 K/UL
NEUTROPHILS # BLD AUTO: 14.3 K/UL
NEUTROPHILS NFR BLD: 90.5 %
NEUTROPHILS NFR BLD: 91 %
NEUTROPHILS NFR BLD: 92.4 %
NEUTROPHILS NFR BLD: 92.5 %
NEUTROPHILS NFR BLD: 93.2 %
NRBC BLD-RTO: 0 /100 WBC
OVALOCYTES BLD QL SMEAR: ABNORMAL
PCO2 BLDA: 20.7 MMHG (ref 35–45)
PCO2 BLDA: 25.4 MMHG (ref 35–45)
PCO2 BLDA: 25.4 MMHG (ref 35–45)
PCO2 BLDA: 26.9 MMHG (ref 35–45)
PEEP: 5
PEEP: 5
PH SMN: 7.4 [PH] (ref 7.35–7.45)
PH SMN: 7.41 [PH] (ref 7.35–7.45)
PH SMN: 7.44 [PH] (ref 7.35–7.45)
PH SMN: 7.51 [PH] (ref 7.35–7.45)
PIP: 19
PIP: 25
PLATELET # BLD AUTO: 101 K/UL
PLATELET # BLD AUTO: 47 K/UL
PLATELET # BLD AUTO: 47 K/UL
PLATELET # BLD AUTO: 48 K/UL
PLATELET # BLD AUTO: 48 K/UL
PLATELET BLD QL SMEAR: ABNORMAL
PMV BLD AUTO: 10 FL
PMV BLD AUTO: 10 FL
PMV BLD AUTO: 10.3 FL
PMV BLD AUTO: 10.9 FL
PMV BLD AUTO: 9.9 FL
PO2 BLDA: 135 MMHG (ref 80–100)
PO2 BLDA: 206 MMHG (ref 80–100)
PO2 BLDA: 209 MMHG (ref 80–100)
PO2 BLDA: 52 MMHG (ref 40–60)
POC BE: -6 MMOL/L
POC BE: -7 MMOL/L
POC BE: -8 MMOL/L
POC BE: -9 MMOL/L
POC SATURATED O2: 100 % (ref 95–100)
POC SATURATED O2: 100 % (ref 95–100)
POC SATURATED O2: 87 % (ref 95–100)
POC SATURATED O2: 99 % (ref 95–100)
POC TCO2: 16 MMOL/L (ref 24–29)
POC TCO2: 17 MMOL/L (ref 23–27)
POC TCO2: 18 MMOL/L (ref 23–27)
POC TCO2: 18 MMOL/L (ref 23–27)
POIKILOCYTOSIS BLD QL SMEAR: SLIGHT
POTASSIUM SERPL-SCNC: 3.4 MMOL/L
POTASSIUM SERPL-SCNC: 3.6 MMOL/L
POTASSIUM SERPL-SCNC: 3.8 MMOL/L
POTASSIUM SERPL-SCNC: 3.9 MMOL/L
PROT SERPL-MCNC: 5.9 G/DL
PROTHROMBIN TIME: 13.6 SEC
PROTHROMBIN TIME: 13.6 SEC
PROTHROMBIN TIME: 14 SEC
PROTHROMBIN TIME: 14.4 SEC
PS: 10
PS: 10
RBC # BLD AUTO: 2.63 M/UL
RBC # BLD AUTO: 2.8 M/UL
RBC # BLD AUTO: 2.8 M/UL
RBC # BLD AUTO: 2.82 M/UL
RBC # BLD AUTO: 2.87 M/UL
SAMPLE: ABNORMAL
SITE: ABNORMAL
SODIUM SERPL-SCNC: 134 MMOL/L
SODIUM SERPL-SCNC: 136 MMOL/L
SODIUM SERPL-SCNC: 136 MMOL/L
SP02: 100
TACROLIMUS BLD-MCNC: 2.9 NG/ML
TARGETS BLD QL SMEAR: ABNORMAL
TRANS PLATPHERESIS VOL PATIENT: NORMAL ML
TRANS PLATPHERESIS VOL PATIENT: NORMAL ML
VT: 363
VT: 513
WBC # BLD AUTO: 10.88 K/UL
WBC # BLD AUTO: 11.5 K/UL
WBC # BLD AUTO: 12.98 K/UL
WBC # BLD AUTO: 14.9 K/UL
WBC # BLD AUTO: 15.77 K/UL

## 2017-12-27 PROCEDURE — 99900026 HC AIRWAY MAINTENANCE (STAT)

## 2017-12-27 PROCEDURE — 80053 COMPREHEN METABOLIC PANEL: CPT

## 2017-12-27 PROCEDURE — 82306 VITAMIN D 25 HYDROXY: CPT

## 2017-12-27 PROCEDURE — P9035 PLATELET PHERES LEUKOREDUCED: HCPCS

## 2017-12-27 PROCEDURE — 27000221 HC OXYGEN, UP TO 24 HOURS

## 2017-12-27 PROCEDURE — S0028 INJECTION, FAMOTIDINE, 20 MG: HCPCS | Performed by: TRANSPLANT SURGERY

## 2017-12-27 PROCEDURE — 25000003 PHARM REV CODE 250: Performed by: NURSE PRACTITIONER

## 2017-12-27 PROCEDURE — 85730 THROMBOPLASTIN TIME PARTIAL: CPT

## 2017-12-27 PROCEDURE — 80048 BASIC METABOLIC PNL TOTAL CA: CPT | Mod: 91

## 2017-12-27 PROCEDURE — 20000000 HC ICU ROOM

## 2017-12-27 PROCEDURE — P9045 ALBUMIN (HUMAN), 5%, 250 ML: HCPCS | Performed by: STUDENT IN AN ORGANIZED HEALTH CARE EDUCATION/TRAINING PROGRAM

## 2017-12-27 PROCEDURE — 63600175 PHARM REV CODE 636 W HCPCS: Performed by: STUDENT IN AN ORGANIZED HEALTH CARE EDUCATION/TRAINING PROGRAM

## 2017-12-27 PROCEDURE — 83735 ASSAY OF MAGNESIUM: CPT

## 2017-12-27 PROCEDURE — 85025 COMPLETE CBC W/AUTO DIFF WBC: CPT

## 2017-12-27 PROCEDURE — 36592 COLLECT BLOOD FROM PICC: CPT

## 2017-12-27 PROCEDURE — 80197 ASSAY OF TACROLIMUS: CPT

## 2017-12-27 PROCEDURE — 63600175 PHARM REV CODE 636 W HCPCS

## 2017-12-27 PROCEDURE — 94761 N-INVAS EAR/PLS OXIMETRY MLT: CPT

## 2017-12-27 PROCEDURE — 85610 PROTHROMBIN TIME: CPT | Mod: 91

## 2017-12-27 PROCEDURE — 82803 BLOOD GASES ANY COMBINATION: CPT

## 2017-12-27 PROCEDURE — 85610 PROTHROMBIN TIME: CPT

## 2017-12-27 PROCEDURE — 84132 ASSAY OF SERUM POTASSIUM: CPT

## 2017-12-27 PROCEDURE — 82977 ASSAY OF GGT: CPT

## 2017-12-27 PROCEDURE — 25000003 PHARM REV CODE 250: Performed by: STUDENT IN AN ORGANIZED HEALTH CARE EDUCATION/TRAINING PROGRAM

## 2017-12-27 PROCEDURE — 97803 MED NUTRITION INDIV SUBSEQ: CPT

## 2017-12-27 PROCEDURE — 37799 UNLISTED PX VASCULAR SURGERY: CPT

## 2017-12-27 PROCEDURE — 82248 BILIRUBIN DIRECT: CPT

## 2017-12-27 PROCEDURE — 99232 SBSQ HOSP IP/OBS MODERATE 35: CPT | Mod: ,,, | Performed by: NURSE PRACTITIONER

## 2017-12-27 PROCEDURE — 84450 TRANSFERASE (AST) (SGOT): CPT

## 2017-12-27 PROCEDURE — 94010 BREATHING CAPACITY TEST: CPT

## 2017-12-27 PROCEDURE — 63600175 PHARM REV CODE 636 W HCPCS: Performed by: TRANSPLANT SURGERY

## 2017-12-27 PROCEDURE — 83605 ASSAY OF LACTIC ACID: CPT

## 2017-12-27 PROCEDURE — 25000003 PHARM REV CODE 250: Performed by: TRANSPLANT SURGERY

## 2017-12-27 PROCEDURE — 99900017 HC EXTUBATION W/PARAMETERS (STAT)

## 2017-12-27 PROCEDURE — 63600175 PHARM REV CODE 636 W HCPCS: Performed by: NURSE PRACTITIONER

## 2017-12-27 PROCEDURE — 80048 BASIC METABOLIC PNL TOTAL CA: CPT

## 2017-12-27 PROCEDURE — 94150 VITAL CAPACITY TEST: CPT

## 2017-12-27 RX ORDER — FAMOTIDINE 20 MG/1
20 TABLET, FILM COATED ORAL NIGHTLY
Qty: 30 TABLET | Refills: 11 | Status: SHIPPED | OUTPATIENT
Start: 2017-12-27 | End: 2019-01-10

## 2017-12-27 RX ORDER — IBUPROFEN 200 MG
24 TABLET ORAL
Status: DISCONTINUED | OUTPATIENT
Start: 2017-12-27 | End: 2017-12-29

## 2017-12-27 RX ORDER — HYDROCODONE BITARTRATE AND ACETAMINOPHEN 500; 5 MG/1; MG/1
TABLET ORAL
Status: DISCONTINUED | OUTPATIENT
Start: 2017-12-27 | End: 2017-12-29

## 2017-12-27 RX ORDER — INDOMETHACIN 25 MG/1
50 CAPSULE ORAL ONCE
Status: COMPLETED | OUTPATIENT
Start: 2017-12-27 | End: 2017-12-27

## 2017-12-27 RX ORDER — VALGANCICLOVIR 450 MG/1
450 TABLET, FILM COATED ORAL DAILY
Status: DISCONTINUED | OUTPATIENT
Start: 2017-12-28 | End: 2017-12-29

## 2017-12-27 RX ORDER — ALBUMIN HUMAN 50 G/1000ML
25 SOLUTION INTRAVENOUS ONCE
Status: COMPLETED | OUTPATIENT
Start: 2017-12-27 | End: 2017-12-27

## 2017-12-27 RX ORDER — SULFAMETHOXAZOLE AND TRIMETHOPRIM 400; 80 MG/1; MG/1
1 TABLET ORAL DAILY
Qty: 30 TABLET | Refills: 5 | Status: ON HOLD | OUTPATIENT
Start: 2017-12-26 | End: 2018-02-15 | Stop reason: HOSPADM

## 2017-12-27 RX ORDER — PREDNISONE 10 MG/1
TABLET ORAL
Qty: 60 TABLET | Refills: 0 | Status: ON HOLD | OUTPATIENT
Start: 2017-12-27 | End: 2018-02-15 | Stop reason: HOSPADM

## 2017-12-27 RX ORDER — POTASSIUM CHLORIDE 14.9 MG/ML
20 INJECTION INTRAVENOUS ONCE
Status: COMPLETED | OUTPATIENT
Start: 2017-12-27 | End: 2017-12-27

## 2017-12-27 RX ORDER — MYCOPHENOLATE MOFETIL 250 MG/1
1000 CAPSULE ORAL 2 TIMES DAILY
Qty: 240 CAPSULE | Refills: 2 | Status: SHIPPED | OUTPATIENT
Start: 2017-12-27 | End: 2018-02-05 | Stop reason: SINTOL

## 2017-12-27 RX ORDER — GLUCAGON 1 MG
1 KIT INJECTION
Status: DISCONTINUED | OUTPATIENT
Start: 2017-12-27 | End: 2017-12-29

## 2017-12-27 RX ORDER — POSACONAZOLE 100 MG/1
300 TABLET, DELAYED RELEASE ORAL DAILY
Status: DISCONTINUED | OUTPATIENT
Start: 2017-12-28 | End: 2018-01-05 | Stop reason: HOSPADM

## 2017-12-27 RX ORDER — HYDRALAZINE HYDROCHLORIDE 20 MG/ML
10 INJECTION INTRAMUSCULAR; INTRAVENOUS EVERY 6 HOURS PRN
Status: DISCONTINUED | OUTPATIENT
Start: 2017-12-27 | End: 2018-01-05 | Stop reason: HOSPADM

## 2017-12-27 RX ORDER — TACROLIMUS 1 MG/1
1 CAPSULE ORAL 2 TIMES DAILY
Status: DISCONTINUED | OUTPATIENT
Start: 2017-12-27 | End: 2017-12-31

## 2017-12-27 RX ORDER — FUROSEMIDE 10 MG/ML
40 INJECTION INTRAMUSCULAR; INTRAVENOUS ONCE
Status: COMPLETED | OUTPATIENT
Start: 2017-12-27 | End: 2017-12-27

## 2017-12-27 RX ORDER — TACROLIMUS 1 MG/1
6 CAPSULE ORAL EVERY 12 HOURS
Qty: 360 CAPSULE | Refills: 11 | Status: SHIPPED | OUTPATIENT
Start: 2017-12-27 | End: 2018-01-05

## 2017-12-27 RX ORDER — INSULIN ASPART 100 [IU]/ML
0-4 INJECTION, SOLUTION INTRAVENOUS; SUBCUTANEOUS
Status: DISCONTINUED | OUTPATIENT
Start: 2017-12-27 | End: 2017-12-29

## 2017-12-27 RX ORDER — VALGANCICLOVIR 450 MG/1
450 TABLET, FILM COATED ORAL DAILY
Qty: 30 TABLET | Refills: 2 | Status: SHIPPED | OUTPATIENT
Start: 2017-12-26 | End: 2018-02-05 | Stop reason: SINTOL

## 2017-12-27 RX ORDER — POSACONAZOLE 100 MG/1
300 TABLET, DELAYED RELEASE ORAL 2 TIMES DAILY
Status: COMPLETED | OUTPATIENT
Start: 2017-12-27 | End: 2017-12-28

## 2017-12-27 RX ORDER — POSACONAZOLE 100 MG/1
300 TABLET, DELAYED RELEASE ORAL DAILY
Qty: 90 TABLET | Refills: 0 | Status: SHIPPED | OUTPATIENT
Start: 2017-12-26 | End: 2018-01-25

## 2017-12-27 RX ORDER — FLUCONAZOLE 200 MG/1
200 TABLET ORAL DAILY
Status: CANCELLED | OUTPATIENT
Start: 2017-12-28

## 2017-12-27 RX ORDER — FAMOTIDINE 20 MG/1
20 TABLET, FILM COATED ORAL NIGHTLY
Status: DISCONTINUED | OUTPATIENT
Start: 2017-12-27 | End: 2018-01-05 | Stop reason: HOSPADM

## 2017-12-27 RX ORDER — IBUPROFEN 200 MG
16 TABLET ORAL
Status: DISCONTINUED | OUTPATIENT
Start: 2017-12-27 | End: 2017-12-29

## 2017-12-27 RX ORDER — MYCOPHENOLATE MOFETIL 250 MG/1
1000 CAPSULE ORAL 2 TIMES DAILY
Status: DISCONTINUED | OUTPATIENT
Start: 2017-12-27 | End: 2018-01-05 | Stop reason: HOSPADM

## 2017-12-27 RX ORDER — HYDRALAZINE HYDROCHLORIDE 20 MG/ML
INJECTION INTRAMUSCULAR; INTRAVENOUS
Status: COMPLETED
Start: 2017-12-27 | End: 2017-12-27

## 2017-12-27 RX ADMIN — PROPOFOL 50 MCG/KG/MIN: 10 INJECTION, EMULSION INTRAVENOUS at 03:12

## 2017-12-27 RX ADMIN — AMPICILLIN SODIUM AND SULBACTAM SODIUM 3 G: 2; 1 INJECTION, POWDER, FOR SOLUTION INTRAMUSCULAR; INTRAVENOUS at 01:12

## 2017-12-27 RX ADMIN — PANTOPRAZOLE SODIUM 40 MG: 40 TABLET, DELAYED RELEASE ORAL at 09:12

## 2017-12-27 RX ADMIN — FAMOTIDINE 20 MG: 20 TABLET, FILM COATED ORAL at 08:12

## 2017-12-27 RX ADMIN — HYDRALAZINE HYDROCHLORIDE 10 MG: 20 INJECTION INTRAMUSCULAR; INTRAVENOUS at 10:12

## 2017-12-27 RX ADMIN — ALBUMIN (HUMAN) 25 G: 12.5 SOLUTION INTRAVENOUS at 02:12

## 2017-12-27 RX ADMIN — NYSTATIN 500000 UNITS: 500000 SUSPENSION ORAL at 12:12

## 2017-12-27 RX ADMIN — ALBUMIN (HUMAN) 25 G: 12.5 SOLUTION INTRAVENOUS at 08:12

## 2017-12-27 RX ADMIN — MYCOPHENOLATE MOFETIL 1000 MG: 250 CAPSULE ORAL at 08:12

## 2017-12-27 RX ADMIN — HEPARIN SODIUM 5000 UNITS: 5000 INJECTION, SOLUTION INTRAVENOUS; SUBCUTANEOUS at 06:12

## 2017-12-27 RX ADMIN — NYSTATIN 500000 UNITS: 500000 SUSPENSION ORAL at 05:12

## 2017-12-27 RX ADMIN — Medication 1 MG: at 09:12

## 2017-12-27 RX ADMIN — HYDROMORPHONE HYDROCHLORIDE 0.5 MG: 1 INJECTION, SOLUTION INTRAMUSCULAR; INTRAVENOUS; SUBCUTANEOUS at 09:12

## 2017-12-27 RX ADMIN — HEPARIN SODIUM 5000 UNITS: 5000 INJECTION, SOLUTION INTRAVENOUS; SUBCUTANEOUS at 03:12

## 2017-12-27 RX ADMIN — TACROLIMUS 1 MG: 1 CAPSULE ORAL at 05:12

## 2017-12-27 RX ADMIN — MORPHINE 450 MG: 10 SOLUTION ORAL at 11:12

## 2017-12-27 RX ADMIN — NYSTATIN 500000 UNITS: 500000 SUSPENSION ORAL at 06:12

## 2017-12-27 RX ADMIN — POSACONAZOLE 300 MG: 100 TABLET, COATED ORAL at 04:12

## 2017-12-27 RX ADMIN — POTASSIUM CHLORIDE 20 MEQ: 200 INJECTION, SOLUTION INTRAVENOUS at 08:12

## 2017-12-27 RX ADMIN — AMPICILLIN SODIUM AND SULBACTAM SODIUM 3 G: 2; 1 INJECTION, POWDER, FOR SOLUTION INTRAMUSCULAR; INTRAVENOUS at 07:12

## 2017-12-27 RX ADMIN — ALBUMIN (HUMAN) 25 G: 12.5 SOLUTION INTRAVENOUS at 12:12

## 2017-12-27 RX ADMIN — METHYLPREDNISOLONE SODIUM SUCCINATE 100 MG: 125 INJECTION, POWDER, FOR SOLUTION INTRAMUSCULAR; INTRAVENOUS at 09:12

## 2017-12-27 RX ADMIN — MAGNESIUM SULFATE IN WATER 2 G: 40 INJECTION, SOLUTION INTRAVENOUS at 03:12

## 2017-12-27 RX ADMIN — METHYLPREDNISOLONE SODIUM SUCCINATE 100 MG: 125 INJECTION, POWDER, FOR SOLUTION INTRAMUSCULAR; INTRAVENOUS at 08:12

## 2017-12-27 RX ADMIN — FUROSEMIDE 40 MG: 10 INJECTION, SOLUTION INTRAMUSCULAR; INTRAVENOUS at 10:12

## 2017-12-27 RX ADMIN — FAMOTIDINE 20 MG: 10 INJECTION, SOLUTION INTRAVENOUS at 09:12

## 2017-12-27 RX ADMIN — SODIUM CHLORIDE 1.7 UNITS/HR: 9 INJECTION, SOLUTION INTRAVENOUS at 11:12

## 2017-12-27 RX ADMIN — SODIUM BICARBONATE 50 MEQ: 84 INJECTION, SOLUTION INTRAVENOUS at 06:12

## 2017-12-27 RX ADMIN — MUPIROCIN 1 G: 20 OINTMENT TOPICAL at 09:12

## 2017-12-27 RX ADMIN — SULFAMETHOXAZOLE AND TRIMETHOPRIM 1 TABLET: 400; 80 TABLET ORAL at 09:12

## 2017-12-27 RX ADMIN — PHYTONADIONE 10 MG: 10 INJECTION, EMULSION INTRAMUSCULAR; INTRAVENOUS; SUBCUTANEOUS at 06:12

## 2017-12-27 RX ADMIN — Medication 1000 MG: at 09:12

## 2017-12-27 RX ADMIN — HEPARIN SODIUM 5000 UNITS: 5000 INJECTION, SOLUTION INTRAVENOUS; SUBCUTANEOUS at 09:12

## 2017-12-27 RX ADMIN — MUPIROCIN 1 G: 20 OINTMENT TOPICAL at 08:12

## 2017-12-27 RX ADMIN — HYDROMORPHONE HYDROCHLORIDE 0.5 MG: 1 INJECTION, SOLUTION INTRAMUSCULAR; INTRAVENOUS; SUBCUTANEOUS at 04:12

## 2017-12-27 NOTE — PROGRESS NOTES
Pt extubated to 2L NC. Pt tolerated well and resting comfortably at this time. Will continue to monitor.

## 2017-12-27 NOTE — PT/OT/SLP PROGRESS
Physical Therapy      Patient Name:  Marcie Bazan   MRN:  [id number]    Patient not seen today secondary to pt extubated this AM ~10:30; therapy hold for 2 hours post extubation. In PM, unable to visit pt 2/2 RN care at bedside. Will follow up at next scheduled visit.     Tracey Munoz, PT, DPT   12/27/2017  Pager: [phone]

## 2017-12-27 NOTE — NURSING
Notified Dr. Medina of development of hematoma immediately following d/c of central line, pressure held x1.5 hrs and site continuing to ooze.  MD to bedside, no new orders.  Surgicel and pressure drsg applied to site, and monitored closely.  Bleeding appears to have subsided.  Will continue to monitor.

## 2017-12-27 NOTE — PROGRESS NOTES
"[facility]  Endocrinology  Progress Note    Admit Date: 11/23/2017     Reason for Consult: Management ofHyperglycemia     Surgical Procedure and Date: OLT 12/26/2017    HPI:   Patient is a 52 y.o. female with ESLD 2/2 decompensated alcoholic hepatitis who is now s/p liver transplant. Endocrine consulted for BG management. No personal h/o DM, + FH in father and sister per Epic review.     Interval HPI:   Overnight events: SM taper starting today 100 mg BID. On IIP, BG q1h. Remains intubated on ventilatory support. BG at goal, on 2.1 units/hr on insulin infusion since MN  Eating:   NPO  Hypoglycemia and intervention: No  Fever: No  TPN and/or TF: No  If yes, type of TF/TPN and rate:     BP (!) 157/80 (BP Location: Right arm, Patient Position: Lying)   Pulse 86   Temp 98.6 °F (37 °C) (Core ([place]))   Resp (!) 23   Ht 5' 5" (1.651 m)   Wt 60.5 kg (133 lb 6.1 oz)   LMP 01/01/1985 (Approximate) Comment: 1985  SpO2 100%   Breastfeeding? No   BMI 22.20 kg/m²       Labs Reviewed and Include      Recent Labs  Lab 12/27/17  0126 12/27/17  0512   *  --    CALCIUM 8.0*  --    ALBUMIN 4.1  --    PROT 5.9*  --    *  --    K 3.9  --    CO2 16*  --      --    BUN 23*  --    CREATININE 1.3  --    ALKPHOS 93  --    *  --    *  288* 225*   BILITOT 4.9*  --      Lab Results   Component Value Date    WBC 12.98 (H) 12/27/2017    HGB 8.7 (L) 12/27/2017    HCT 24.1 (L) 12/27/2017    MCV 86 12/27/2017    PLT 47 (L) 12/27/2017     No results for input(s): TSH, FREET4 in the last 168 hours.  Lab Results   Component Value Date    HGBA1C <4.0 (A) 10/20/2017       Nutritional status:   Body mass index is 22.2 kg/m².  Lab Results   Component Value Date    ALBUMIN 4.1 12/27/2017    ALBUMIN 2.7 (L) 12/26/2017    ALBUMIN 2.7 (L) 12/26/2017     Lab Results   Component Value Date    PREALBUMIN 4 (L) 11/27/2017    PREALBUMIN 3 (L) 10/20/2017       Estimated Creatinine Clearance: 44.5 " mL/min (based on SCr of 1.3 mg/dL).    Accu-Checks  No results for input(s): POCTGLUCOSE in the last 72 hours.    Current Medications and/or Treatments Impacting Glycemic Control  Immunotherapy:  Immunosuppressants         Stop Route Frequency     mycophenolate mofetil 200 mg/mL suspension 1,000 mg      -- Oral 2 times daily     tacrolimus (PROGRAF) 1 mg/mL oral syringe      -- Oral 2 times daily        Steroids:   Hormones     Start     Stop Route Frequency Ordered    01/01/18 0900  predniSONE tablet 20 mg  (methylprednisolone taper panel)      -- Oral Daily 12/26/17 1425    12/31/17 0900  methylPREDNISolone sodium succinate injection 20 mg  (methylprednisolone taper panel)      01/01 0859 IV Every 12 hours 12/26/17 1425    12/30/17 0900  methylPREDNISolone sodium succinate injection 40 mg  (methylprednisolone taper panel)      12/31 0859 IV Every 12 hours 12/26/17 1425    12/29/17 0900  methylPREDNISolone sodium succinate injection 60 mg  (methylprednisolone taper panel)      12/30 0859 IV Every 12 hours 12/26/17 1425    12/28/17 0900  methylPREDNISolone sodium succinate injection 80 mg  (methylprednisolone taper panel)      12/29 0859 IV Every 12 hours 12/26/17 1425    12/27/17 0900  methylPREDNISolone sodium succinate injection 100 mg  (methylprednisolone taper panel)      12/28 0859 IV Every 12 hours 12/26/17 1425        Pressors:    Autonomic Drugs     None        Hyperglycemia/Diabetes Medications: Antihyperglycemics     Start     Stop Route Frequency Ordered    12/26/17 1715  insulin regular (Humulin R) 100 Units in sodium chloride 0.9% 100 mL infusion      -- IV Continuous 12/26/17 1604          ASSESSMENT and PLAN    * Liver transplant 12/26/2017 for ETOH    Avoid hypoglycemia.        Hyperglycemia    BG goal 140-180. Change to transition insulin infusion @ 1.7 units/hr, change BG to q4h with low dose correction scale     Discharge planning: TBD        NAFLD (nonalcoholic fatty liver disease)    S/p liver  transplant        Long-term use of immunosuppressant medication    May increase insulin resistance and BG readings         Adverse effect of adrenal cortical steroids    Standard SM taper beginning 12/27/2017.  Increases BG, mostly prandial needs.               Carrie Matthews NP  Endocrinology  [facility]

## 2017-12-27 NOTE — CONSULTS
[facility]  Adult Nutrition  Consult Note    SUMMARY     Recommendations    1. Once extubated, advance PO diet order to Regular as tolerated.   2. Add 2 packets Beneprotein to each tray to meet increased protein needs.   3. If PO intake poor >48 hrs, add Boost Plus TID to supplement intake.   4. If pt remains intubated >72 hrs, begin enteral feeds with Impact Peptide 1.5.   - Goal: 40 mL/hr to provide 1440 calories & 90 g of protein.  5. RD to monitor & follow-up.     Goals: Meet % EEN, EPN  Nutrition Goal Status: new  Communication of RD Recs: reviewed with RN    Reason for Assessment    Reason for Assessment: physician consult, RD follow-up  Diagnosis:  (Decompensated hepatic cirrhosis )  Relevent Medical History: EtOH Hepatits, Cirrhosis, ESRD, Tx candidate,    Interdisciplinary Rounds: did not attend     General Information Comments: S/p Liver tx 12/26. Remains intubated, sedated. Prior to tx, pt on low sodium diet w/ adequate PO intake.   RD to follow-up with post-tx diet education when medically appropriate.   Nutrition Discharge Planning: Adequate nutrition    Nutrition Prescription Ordered    Current Diet Order: NPO    Evaluation of Received Nutrients/Fluid Intake    Other Calories (kcal): (198 calories (Propofol @ 7.5 mL/hr)     IV Fluid (mL): 2400    Nutrition/Diet History    Patient Reported Diet/Restrictions/Preferences: low salt     Typical Food/Fluid Intake: decreased appetite, but never was a big eater     Food Preferences: No cultural or Islam preferences noted     Factors Affecting Nutritional Intake: NPO, on mechanical ventilation    Labs/Tests/Procedures/Meds    Pertinent Labs Reviewed: reviewed  Pertinent Labs Comments: Na 134, GFR 53.7, Gluc 137-178  Pertinent Medications Reviewed: reviewed, pertinent  Pertinent Medications Comments: D5, Insulin, Propofol, Nicardipine    Physical Findings    Overall Physical Appearance: nourished, on ventilator support  Tubes:  "orogastric tube  Oral/Mouth Cavity: WDL  Skin: other (see comments) (Incision/drain - abdomen)    Anthropometrics    Height: 5' 5" (165.1 cm)  Weight Method: Bed Scale  Weight: 60.5 kg (133 lb 6.1 oz)     Ideal Body Weight (IBW), Female: 125 lb     % Ideal Body Weight, Female (lb): 106.7 lb  BMI (Calculated): 22.2  BMI Grade: 18.5-24.9 - normal     Usual Body Weight (UBW), k kg  Weight Change Amount: 22 lb 0.7 oz    Estimated/Assessed Needs    Weight Used For Calorie Calculations: 60.5 kg (133 lb 6.1 oz)   Height (cm): 165.1 cm    Energy Calorie Requirements (kcal): 1357 kcal/d  Energy Need Method: [facility]     Weight Used For Protein Calculations: 60.5 kg (133 lb 6.1 oz)  Protein Requirements: 73-91 g/d (1.2-1.5 g/kg)    Fluid Need Method: RDA Method    Assessment and Plan    Increased nutrient needs related to physiological causes as evidenced by s/p liver transplant.  Status: New    Monitor and Evaluation    Food and Nutrient Intake: energy intake, food and beverage intake, enteral nutrition intake  Food and Nutrient Adminstration: diet order, enteral and parenteral nutrition administration     Physical Activity and Function: nutrition-related ADLs and IADLs  Anthropometric Measurements: weight, weight change  Biochemical Data, Medical Tests and Procedures: lipid profile, inflammatory profile, glucose/endocrine profile, gastrointestinal profile, electrolyte and renal panel  Nutrition-Focused Physical Findings: overall appearance    Nutrition Risk    Level of Risk: other (see comments) (2x/week)    Nutrition Follow-Up    RD Follow-up?: Yes      "

## 2017-12-27 NOTE — PROGRESS NOTES
Met with patient and significant other.  Gave patient discharge book entitled My New Journey for Liver Transplant Patients.  Asked patient and significant other to read the book in preparation for educational session.

## 2017-12-27 NOTE — ANESTHESIA POSTPROCEDURE EVALUATION
"Anesthesia Post Evaluation    Patient: Marcie Bazan    Procedure(s) Performed: Procedure(s) (LRB):  TRANSPLANT-LIVER (N/A)    Final Anesthesia Type: general  Patient location during evaluation: ICU  Patient participation: No - Unable to Participate, Intubation  Level of consciousness: sedated  Post-procedure vital signs: reviewed and stable  Pain management: adequate  Airway patency: patent  PONV status at discharge: No PONV  Anesthetic complications: no      Cardiovascular status: hemodynamically stable  Respiratory status: spontaneous ventilation, ETT, intubated and ventilator  Hydration status: euvolemic  Follow-up not needed.        Visit Vitals  BP (!) 147/78 (BP Location: Right arm, Patient Position: Lying)   Pulse 84   Temp 36.8 °C (98.3 °F) (Core ([place]))   Resp 18   Ht 5' 5" (1.651 m)   Wt 60.5 kg (133 lb 6.1 oz)   LMP 01/01/1985 (Approximate)   SpO2 100%   Breastfeeding? No   BMI 22.20 kg/m²       Pain/Larry Score: Pain Assessment Performed: Yes (12/27/2017  3:00 AM)  Presence of Pain: non-verbal indicators absent (12/27/2017  3:00 AM)  Pain Rating Prior to Med Admin: 6 (non verbal) (12/26/2017  7:29 PM)  Pain Rating Post Med Admin: 7 (12/26/2017  3:50 PM)      "

## 2017-12-27 NOTE — PLAN OF CARE
Problem: Patient Care Overview  Goal: Plan of Care Review  Outcome: Ongoing (interventions implemented as appropriate)  Pt admitted from OR s/p liver transplant. Pt c/o pain, prn meds given. Intubated, on CPAP. Failed weaning parameters. SR-ST on Telemetry. Cardene titrated for -160. 1L albumin given s/t CVP 6 and decreased UOP. Accuchecks Q1H, insulin drip titrated accordinglty. Skin integrity intact and without breakdown. Abdominal incision dressing CDI. Son at bedside. Fall precautions in place, pt did not have any falls or injuries this shift. Restraints in place, will remove when appropriate. Trending labs Q4, electrolytes replaced prn. Plan of care discussed with patient and son, no questions at this time. Will continue to monitor.

## 2017-12-27 NOTE — NURSING
Notified MD of increased BP when transducing switched from femoral to radial arterial lines by approx 20 pts.  Cuff pressure correlated with femoral line.  OK to use cuff BP per Dr. Medina.  Will continue to monitor.

## 2017-12-27 NOTE — ASSESSMENT & PLAN NOTE
BG goal 140-180. Change to transition insulin infusion @ 1.7 units/hr, change BG to q4h with low dose correction scale     Discharge planning: [facility]SH[place]

## 2017-12-27 NOTE — PROGRESS NOTES
TRANSPLANT NOTE:    Admit Date: 2017    ORGAN:   LIVER  Disease Etiology: Alcoholic Cirrhosis  Donor Type:    - Brain Death  [facility] High Risk:   No  Donor CMV Status:    Donor HBcAB:   Negative  Donor HCV Status:   Negative  Whole or Partial: Whole Liver  Biliary Anastomosis: End to End  Arterial Anatomy: Standard    Marcie Blanca Bazan is a 52 y.o. female s/p     - Brain Death liver transplant on 2017 (Liver) for Alcoholic Cirrhosis.  This patient will follow the Steroid Induction protocol.  This patients immunosuppression will include a steroid taper over 6 weeks, Cellcept for 12 weeks and Prograf maintenance.  Opportunistic infection prophylaxis will include Valcyte for 3 months (CMV D- R+), Bactrim for 6 months, and nystatin.  I have reviewed the pre-op medications and those have been restarted those, as appropriate.

## 2017-12-27 NOTE — PROGRESS NOTES
Tidal volume and rate  decreased after last abg, SIMV-VC+ 12/440/40%/5.  Pt no longer triggering over vent with sedation

## 2017-12-27 NOTE — PROGRESS NOTES
"Transplant Note:     SW presented to patient for follow up and continuity of care post liver transplant.  The patient was observed to be laying in bed alert but intubated.  The patient is able to follow commands but unable to speak. The patient made appropriate eye contact when communicating with SW using non verbal cues.  The patient is accompanied by her son Duarte ph# [phone] who reported adequate coping stating that he has been present since yesterday as he took off from work.  Duarte reported "the family has a lot going on right now. We just had a death and the patient does not know."  Duarte reported he and his sister Viola will discuss and confirm the caregiver plan as he is unsure if she will be able to stay locally the entire time.  SW provided caregiver education.  Caregiver states understanding all aspects of caregiver role/commitment.  Caregiver states is able/willing/committed to being caregiver to the fullest extent necessary.  provided in-depth information to patient and caregiver regarding pre- and post-transplant caregiver role.  Caregiver verbalizes understanding of the education provided today.   strongly encourages patient and caregiver to have concrete plan regarding post-transplant care giving, including back-up caregiver(s) to ensure care giving needs are met as needed.  Caregiver verbalizes understanding of caregiver responsibilities.      Duarte reported the patient can use [facility] while local and go back to using [facility] in [facility].      SW provided [employer] contract and financial profile as well as education regarding post lodging at [address].  Duarte denied any questions and reported that he will complete the FP with his sister when she returns.  Patient's caregiver verbalized understanding and agreement with the information reviewed, social work availability, and how to access available resources if needed. KARLA " remains available.

## 2017-12-27 NOTE — PLAN OF CARE
Plan of care reviewed with patient and her son. Received 1500 ml Albumin 5% and Bicarb 50 mEq. Propofol started due to failed parameters. On/off low dose Cardene. Insulin gtt per protocol, MIVF @ 100 ml/hr. UOP 15-60 ml. CVP 5-9. Minimal MAXWELL output.

## 2017-12-27 NOTE — SUBJECTIVE & OBJECTIVE
"Interval HPI:   Overnight events: SM taper starting today 100 mg BID. On IIP, BG q1h. Remains intubated on ventilatory support. BG at goal, on 2.1 units/hr on insulin infusion since MN  Eating:   NPO  Hypoglycemia and intervention: No  Fever: No  TPN and/or TF: No  If yes, type of TF/TPN and rate:     BP (!) 157/80 (BP Location: Right arm, Patient Position: Lying)   Pulse 86   Temp 98.6 °F (37 °C) (Core ([place]))   Resp (!) 23   Ht 5' 5" (1.651 m)   Wt 60.5 kg (133 lb 6.1 oz)   LMP 01/01/1985 (Approximate) Comment: 1985  SpO2 100%   Breastfeeding? No   BMI 22.20 kg/m²     Labs Reviewed and Include      Recent Labs  Lab 12/27/17  0126 12/27/17  0512   *  --    CALCIUM 8.0*  --    ALBUMIN 4.1  --    PROT 5.9*  --    *  --    K 3.9  --    CO2 16*  --      --    BUN 23*  --    CREATININE 1.3  --    ALKPHOS 93  --    *  --    *  288* 225*   BILITOT 4.9*  --      Lab Results   Component Value Date    WBC 12.98 (H) 12/27/2017    HGB 8.7 (L) 12/27/2017    HCT 24.1 (L) 12/27/2017    MCV 86 12/27/2017    PLT 47 (L) 12/27/2017     No results for input(s): TSH, FREET4 in the last 168 hours.  Lab Results   Component Value Date    HGBA1C <4.0 (A) 10/20/2017       Nutritional status:   Body mass index is 22.2 kg/m².  Lab Results   Component Value Date    ALBUMIN 4.1 12/27/2017    ALBUMIN 2.7 (L) 12/26/2017    ALBUMIN 2.7 (L) 12/26/2017     Lab Results   Component Value Date    PREALBUMIN 4 (L) 11/27/2017    PREALBUMIN 3 (L) 10/20/2017       Estimated Creatinine Clearance: 44.5 mL/min (based on SCr of 1.3 mg/dL).    Accu-Checks  No results for input(s): POCTGLUCOSE in the last 72 hours.    Current Medications and/or Treatments Impacting Glycemic Control  Immunotherapy:  Immunosuppressants         Stop Route Frequency     mycophenolate mofetil 200 mg/mL suspension 1,000 mg      -- Oral 2 times daily     tacrolimus (PROGRAF) 1 mg/mL oral syringe      -- Oral 2 times daily        Steroids: "   Hormones     Start     Stop Route Frequency Ordered    01/01/18 0900  predniSONE tablet 20 mg  (methylprednisolone taper panel)      -- Oral Daily 12/26/17 1425    12/31/17 0900  methylPREDNISolone sodium succinate injection 20 mg  (methylprednisolone taper panel)      01/01 0859 IV Every 12 hours 12/26/17 1425    12/30/17 0900  methylPREDNISolone sodium succinate injection 40 mg  (methylprednisolone taper panel)      12/31 0859 IV Every 12 hours 12/26/17 1425    12/29/17 0900  methylPREDNISolone sodium succinate injection 60 mg  (methylprednisolone taper panel)      12/30 0859 IV Every 12 hours 12/26/17 1425    12/28/17 0900  methylPREDNISolone sodium succinate injection 80 mg  (methylprednisolone taper panel)      12/29 0859 IV Every 12 hours 12/26/17 1425    12/27/17 0900  methylPREDNISolone sodium succinate injection 100 mg  (methylprednisolone taper panel)      12/28 0859 IV Every 12 hours 12/26/17 1425        Pressors:    Autonomic Drugs     None        Hyperglycemia/Diabetes Medications: Antihyperglycemics     Start     Stop Route Frequency Ordered    12/26/17 1715  insulin regular (Humulin R) 100 Units in sodium chloride 0.9% 100 mL infusion      -- IV Continuous 12/26/17 7005

## 2017-12-27 NOTE — NURSING
Notified Dr. Medina of continued low UO following administration of albumin.  CVP increased from 8 to 10.  No new orders at this time, will continue to monitor.

## 2017-12-28 PROBLEM — N17.9 AKI (ACUTE KIDNEY INJURY): Status: ACTIVE | Noted: 2017-12-28

## 2017-12-28 LAB
ALBUMIN SERPL BCP-MCNC: 3 G/DL
ALBUMIN SERPL BCP-MCNC: 3.5 G/DL
ALLENS TEST: ABNORMAL
ALP SERPL-CCNC: 86 U/L
ALT SERPL W/O P-5'-P-CCNC: 104 U/L
ANION GAP SERPL CALC-SCNC: 10 MMOL/L
ANION GAP SERPL CALC-SCNC: 11 MMOL/L
APTT BLDCRRT: 30.6 SEC
AST SERPL-CCNC: 54 U/L
BASOPHILS # BLD AUTO: 0.02 K/UL
BASOPHILS NFR BLD: 0.1 %
BILIRUB DIRECT SERPL-MCNC: 2.2 MG/DL
BILIRUB SERPL-MCNC: 3 MG/DL
BLD PROD TYP BPU: NORMAL
BLOOD UNIT EXPIRATION DATE: NORMAL
BLOOD UNIT TYPE CODE: 600
BLOOD UNIT TYPE CODE: 600
BLOOD UNIT TYPE CODE: 6200
BLOOD UNIT TYPE: NORMAL
BUN SERPL-MCNC: 32 MG/DL
BUN SERPL-MCNC: 34 MG/DL
CALCIUM SERPL-MCNC: 7.7 MG/DL
CALCIUM SERPL-MCNC: 7.9 MG/DL
CHLORIDE SERPL-SCNC: 105 MMOL/L
CHLORIDE SERPL-SCNC: 109 MMOL/L
CO2 SERPL-SCNC: 15 MMOL/L
CO2 SERPL-SCNC: 22 MMOL/L
CODING SYSTEM: NORMAL
CREAT SERPL-MCNC: 2 MG/DL
CREAT SERPL-MCNC: 2.1 MG/DL
DELSYS: ABNORMAL
DIFFERENTIAL METHOD: ABNORMAL
DISPENSE STATUS: NORMAL
EOSINOPHIL # BLD AUTO: 0 K/UL
EOSINOPHIL NFR BLD: 0 %
ERYTHROCYTE [DISTWIDTH] IN BLOOD BY AUTOMATED COUNT: 16.4 %
EST. GFR  (AFRICAN AMERICAN): 30.1 ML/MIN/1.73 M^2
EST. GFR  (AFRICAN AMERICAN): 31.9 ML/MIN/1.73 M^2
EST. GFR  (NON AFRICAN AMERICAN): 26.1 ML/MIN/1.73 M^2
EST. GFR  (NON AFRICAN AMERICAN): 27.7 ML/MIN/1.73 M^2
GGT SERPL-CCNC: 59 U/L
GLUCOSE SERPL-MCNC: 114 MG/DL
GLUCOSE SERPL-MCNC: 164 MG/DL
HCO3 UR-SCNC: 16 MMOL/L (ref 24–28)
HCT VFR BLD AUTO: 25.2 %
HGB BLD-MCNC: 9 G/DL
IMM GRANULOCYTES # BLD AUTO: 0.25 K/UL
IMM GRANULOCYTES NFR BLD AUTO: 1.6 %
INR PPP: 1.2
LYMPHOCYTES # BLD AUTO: 1 K/UL
LYMPHOCYTES NFR BLD: 6.1 %
MAGNESIUM SERPL-MCNC: 2.1 MG/DL
MAGNESIUM SERPL-MCNC: 2.5 MG/DL
MCH RBC QN AUTO: 30.7 PG
MCHC RBC AUTO-ENTMCNC: 35.7 G/DL
MCV RBC AUTO: 86 FL
MODE: ABNORMAL
MONOCYTES # BLD AUTO: 0.4 K/UL
MONOCYTES NFR BLD: 2.3 %
NEUTROPHILS # BLD AUTO: 14.2 K/UL
NEUTROPHILS NFR BLD: 89.9 %
NRBC BLD-RTO: 0 /100 WBC
NUM UNITS TRANS FFP: NORMAL
PCO2 BLDA: 22.6 MMHG (ref 35–45)
PH SMN: 7.46 [PH] (ref 7.35–7.45)
PHOSPHATE SERPL-MCNC: 3.9 MG/DL
PHOSPHATE SERPL-MCNC: 3.9 MG/DL
PLATELET # BLD AUTO: 136 K/UL
PMV BLD AUTO: 10.6 FL
PO2 BLDA: 87 MMHG (ref 80–100)
POC BE: -8 MMOL/L
POC SATURATED O2: 97 % (ref 95–100)
POC TCO2: 17 MMOL/L (ref 23–27)
POCT GLUCOSE: 116 MG/DL (ref 70–110)
POCT GLUCOSE: 122 MG/DL (ref 70–110)
POCT GLUCOSE: 122 MG/DL (ref 70–110)
POCT GLUCOSE: 125 MG/DL (ref 70–110)
POCT GLUCOSE: 130 MG/DL (ref 70–110)
POCT GLUCOSE: 130 MG/DL (ref 70–110)
POCT GLUCOSE: 141 MG/DL (ref 70–110)
POCT GLUCOSE: 150 MG/DL (ref 70–110)
POCT GLUCOSE: 153 MG/DL (ref 70–110)
POCT GLUCOSE: 160 MG/DL (ref 70–110)
POCT GLUCOSE: 161 MG/DL (ref 70–110)
POCT GLUCOSE: 164 MG/DL (ref 70–110)
POCT GLUCOSE: 164 MG/DL (ref 70–110)
POCT GLUCOSE: 165 MG/DL (ref 70–110)
POCT GLUCOSE: 167 MG/DL (ref 70–110)
POCT GLUCOSE: 168 MG/DL (ref 70–110)
POCT GLUCOSE: 170 MG/DL (ref 70–110)
POCT GLUCOSE: 171 MG/DL (ref 70–110)
POCT GLUCOSE: 172 MG/DL (ref 70–110)
POCT GLUCOSE: 182 MG/DL (ref 70–110)
POCT GLUCOSE: 182 MG/DL (ref 70–110)
POCT GLUCOSE: 185 MG/DL (ref 70–110)
POCT GLUCOSE: 188 MG/DL (ref 70–110)
POCT GLUCOSE: 197 MG/DL (ref 70–110)
POCT GLUCOSE: 202 MG/DL (ref 70–110)
POTASSIUM SERPL-SCNC: 3.9 MMOL/L
POTASSIUM SERPL-SCNC: 4 MMOL/L
PROT SERPL-MCNC: 5.6 G/DL
PROTHROMBIN TIME: 12.5 SEC
RBC # BLD AUTO: 2.93 M/UL
SAMPLE: ABNORMAL
SITE: ABNORMAL
SODIUM SERPL-SCNC: 135 MMOL/L
SODIUM SERPL-SCNC: 137 MMOL/L
SP02: 100
TACROLIMUS BLD-MCNC: 4.6 NG/ML
VIT A SERPL-MCNC: <13 UG/DL (ref 38–106)
WBC # BLD AUTO: 15.75 K/UL

## 2017-12-28 PROCEDURE — 20000000 HC ICU ROOM

## 2017-12-28 PROCEDURE — 63600175 PHARM REV CODE 636 W HCPCS: Performed by: STUDENT IN AN ORGANIZED HEALTH CARE EDUCATION/TRAINING PROGRAM

## 2017-12-28 PROCEDURE — 94799 UNLISTED PULMONARY SVC/PX: CPT

## 2017-12-28 PROCEDURE — 25000003 PHARM REV CODE 250: Performed by: STUDENT IN AN ORGANIZED HEALTH CARE EDUCATION/TRAINING PROGRAM

## 2017-12-28 PROCEDURE — 94761 N-INVAS EAR/PLS OXIMETRY MLT: CPT

## 2017-12-28 PROCEDURE — 85025 COMPLETE CBC W/AUTO DIFF WBC: CPT

## 2017-12-28 PROCEDURE — 82248 BILIRUBIN DIRECT: CPT

## 2017-12-28 PROCEDURE — 63600175 PHARM REV CODE 636 W HCPCS: Performed by: TRANSPLANT SURGERY

## 2017-12-28 PROCEDURE — 82977 ASSAY OF GGT: CPT

## 2017-12-28 PROCEDURE — 99232 SBSQ HOSP IP/OBS MODERATE 35: CPT | Mod: ,,, | Performed by: NURSE PRACTITIONER

## 2017-12-28 PROCEDURE — 85730 THROMBOPLASTIN TIME PARTIAL: CPT

## 2017-12-28 PROCEDURE — 90945 DIALYSIS ONE EVALUATION: CPT

## 2017-12-28 PROCEDURE — 85610 PROTHROMBIN TIME: CPT

## 2017-12-28 PROCEDURE — 25000003 PHARM REV CODE 250: Performed by: TRANSPLANT SURGERY

## 2017-12-28 PROCEDURE — 83735 ASSAY OF MAGNESIUM: CPT

## 2017-12-28 PROCEDURE — 97164 PT RE-EVAL EST PLAN CARE: CPT

## 2017-12-28 PROCEDURE — 84100 ASSAY OF PHOSPHORUS: CPT

## 2017-12-28 PROCEDURE — 80197 ASSAY OF TACROLIMUS: CPT

## 2017-12-28 PROCEDURE — 80053 COMPREHEN METABOLIC PANEL: CPT

## 2017-12-28 PROCEDURE — 80069 RENAL FUNCTION PANEL: CPT

## 2017-12-28 PROCEDURE — 37799 UNLISTED PX VASCULAR SURGERY: CPT

## 2017-12-28 PROCEDURE — 82803 BLOOD GASES ANY COMBINATION: CPT

## 2017-12-28 PROCEDURE — 83735 ASSAY OF MAGNESIUM: CPT | Mod: 91

## 2017-12-28 RX ORDER — SODIUM BICARBONATE 650 MG/1
1300 TABLET ORAL 2 TIMES DAILY
Status: DISCONTINUED | OUTPATIENT
Start: 2017-12-28 | End: 2017-12-30

## 2017-12-28 RX ORDER — HYDROMORPHONE HYDROCHLORIDE 5 MG/5ML
1 SOLUTION ORAL EVERY 4 HOURS PRN
Status: DISCONTINUED | OUTPATIENT
Start: 2017-12-28 | End: 2017-12-28

## 2017-12-28 RX ORDER — NIFEDIPINE 30 MG/1
30 TABLET, EXTENDED RELEASE ORAL DAILY
Status: DISCONTINUED | OUTPATIENT
Start: 2017-12-28 | End: 2017-12-30

## 2017-12-28 RX ORDER — FUROSEMIDE 10 MG/ML
80 INJECTION INTRAMUSCULAR; INTRAVENOUS ONCE
Status: COMPLETED | OUTPATIENT
Start: 2017-12-28 | End: 2017-12-28

## 2017-12-28 RX ORDER — HYDROCODONE BITARTRATE AND ACETAMINOPHEN 500; 5 MG/1; MG/1
TABLET ORAL
Status: ACTIVE | OUTPATIENT
Start: 2017-12-28 | End: 2017-12-29

## 2017-12-28 RX ORDER — HYDRALAZINE HYDROCHLORIDE 20 MG/ML
10 INJECTION INTRAMUSCULAR; INTRAVENOUS ONCE
Status: COMPLETED | OUTPATIENT
Start: 2017-12-28 | End: 2017-12-28

## 2017-12-28 RX ORDER — OXYCODONE HYDROCHLORIDE 5 MG/1
5 TABLET ORAL EVERY 4 HOURS PRN
Status: DISCONTINUED | OUTPATIENT
Start: 2017-12-28 | End: 2017-12-29

## 2017-12-28 RX ORDER — LABETALOL HYDROCHLORIDE 5 MG/ML
10 INJECTION, SOLUTION INTRAVENOUS EVERY 4 HOURS PRN
Status: DISCONTINUED | OUTPATIENT
Start: 2017-12-28 | End: 2018-01-05 | Stop reason: HOSPADM

## 2017-12-28 RX ORDER — INDOMETHACIN 25 MG/1
50 CAPSULE ORAL ONCE
Status: COMPLETED | OUTPATIENT
Start: 2017-12-28 | End: 2017-12-28

## 2017-12-28 RX ORDER — MAGNESIUM SULFATE HEPTAHYDRATE 40 MG/ML
2 INJECTION, SOLUTION INTRAVENOUS
Status: ACTIVE | OUTPATIENT
Start: 2017-12-28 | End: 2017-12-29

## 2017-12-28 RX ORDER — QUETIAPINE FUMARATE 25 MG/1
50 TABLET, FILM COATED ORAL NIGHTLY
Status: DISCONTINUED | OUTPATIENT
Start: 2017-12-28 | End: 2017-12-29

## 2017-12-28 RX ADMIN — POSACONAZOLE 300 MG: 100 TABLET, COATED ORAL at 09:12

## 2017-12-28 RX ADMIN — POSACONAZOLE 300 MG: 100 TABLET, COATED ORAL at 03:12

## 2017-12-28 RX ADMIN — TACROLIMUS 1 MG: 1 CAPSULE ORAL at 09:12

## 2017-12-28 RX ADMIN — LABETALOL HYDROCHLORIDE 10 MG: 5 INJECTION, SOLUTION INTRAVENOUS at 01:12

## 2017-12-28 RX ADMIN — HYDRALAZINE HYDROCHLORIDE 10 MG: 20 INJECTION INTRAMUSCULAR; INTRAVENOUS at 01:12

## 2017-12-28 RX ADMIN — METHYLPREDNISOLONE SODIUM SUCCINATE 80 MG: 125 INJECTION, POWDER, FOR SOLUTION INTRAMUSCULAR; INTRAVENOUS at 09:12

## 2017-12-28 RX ADMIN — HEPARIN SODIUM 5000 UNITS: 5000 INJECTION, SOLUTION INTRAVENOUS; SUBCUTANEOUS at 01:12

## 2017-12-28 RX ADMIN — OXYCODONE HYDROCHLORIDE 5 MG: 5 TABLET ORAL at 08:12

## 2017-12-28 RX ADMIN — HEPARIN SODIUM 5000 UNITS: 5000 INJECTION, SOLUTION INTRAVENOUS; SUBCUTANEOUS at 11:12

## 2017-12-28 RX ADMIN — FAMOTIDINE 20 MG: 20 TABLET, FILM COATED ORAL at 08:12

## 2017-12-28 RX ADMIN — NYSTATIN 500000 UNITS: 500000 SUSPENSION ORAL at 11:12

## 2017-12-28 RX ADMIN — PANTOPRAZOLE SODIUM 40 MG: 40 TABLET, DELAYED RELEASE ORAL at 09:12

## 2017-12-28 RX ADMIN — MUPIROCIN 1 G: 20 OINTMENT TOPICAL at 09:12

## 2017-12-28 RX ADMIN — QUETIAPINE FUMARATE 50 MG: 25 TABLET, FILM COATED ORAL at 08:12

## 2017-12-28 RX ADMIN — HYDRALAZINE HYDROCHLORIDE 10 MG: 20 INJECTION INTRAMUSCULAR; INTRAVENOUS at 04:12

## 2017-12-28 RX ADMIN — SODIUM BICARBONATE 50 MEQ: 84 INJECTION, SOLUTION INTRAVENOUS at 11:12

## 2017-12-28 RX ADMIN — METHYLPREDNISOLONE SODIUM SUCCINATE 80 MG: 125 INJECTION, POWDER, FOR SOLUTION INTRAMUSCULAR; INTRAVENOUS at 08:12

## 2017-12-28 RX ADMIN — NIFEDIPINE 30 MG: 30 TABLET, FILM COATED, EXTENDED RELEASE ORAL at 09:12

## 2017-12-28 RX ADMIN — TACROLIMUS 1 MG: 1 CAPSULE ORAL at 06:12

## 2017-12-28 RX ADMIN — FUROSEMIDE 80 MG: 10 INJECTION, SOLUTION INTRAMUSCULAR; INTRAVENOUS at 09:12

## 2017-12-28 RX ADMIN — HYDROMORPHONE HYDROCHLORIDE 1 MG: 1 SOLUTION ORAL at 02:12

## 2017-12-28 RX ADMIN — HYDRALAZINE HYDROCHLORIDE 10 MG: 20 INJECTION INTRAMUSCULAR; INTRAVENOUS at 08:12

## 2017-12-28 RX ADMIN — NYSTATIN 500000 UNITS: 500000 SUSPENSION ORAL at 06:12

## 2017-12-28 RX ADMIN — VALGANCICLOVIR 450 MG: 450 TABLET, FILM COATED ORAL at 09:12

## 2017-12-28 RX ADMIN — MYCOPHENOLATE MOFETIL 1000 MG: 250 CAPSULE ORAL at 09:12

## 2017-12-28 RX ADMIN — MYCOPHENOLATE MOFETIL 1000 MG: 250 CAPSULE ORAL at 08:12

## 2017-12-28 RX ADMIN — SULFAMETHOXAZOLE AND TRIMETHOPRIM 1 TABLET: 400; 80 TABLET ORAL at 09:12

## 2017-12-28 RX ADMIN — MUPIROCIN 1 G: 20 OINTMENT TOPICAL at 08:12

## 2017-12-28 RX ADMIN — SODIUM BICARBONATE 650 MG TABLET 1300 MG: at 08:12

## 2017-12-28 RX ADMIN — NYSTATIN 500000 UNITS: 500000 SUSPENSION ORAL at 12:12

## 2017-12-28 NOTE — PLAN OF CARE
Problem: Patient Care Overview  Goal: Plan of Care Review  Outcome: Ongoing (interventions implemented as appropriate)  Currently VSS.  PRN 10 mg hydralazine administered x 2 to maintain SBP < 160.  O2 sats % on RA.  Pt remains oriented to self and intermittently oriented to place.  PRN pain medication administered x 2.  UOP decreased to less than 5 cc/hr.  40 mg lasix administered with no response.  CVP 12. Insulin gtt @ 1.7 units/hr.  POCt glucose q4h.  No PRN insulin coverage required.  D51/2NS @ 100 cc/hr.  Chevron incision dressing intact with no new drainage noted.

## 2017-12-28 NOTE — PROGRESS NOTES
SW presented to patient for follow up post liver transplant.  The patient was observed to be laying in bed alert, oriented x4 and communicative. The patient made appropriate eye contact when communicating with SW.  The patient was accompanied by his caregiver (son) Duarte.  The patient is currently extubated and stated that she is coping well. The pt was not very talkative today.  Pt appeared to stare into space at times when SW would attempt assessment. Pt son reported that the patient is not acting herself. SW provided emotional support, reflective listening and normalization. The pt denied any needs at this time. No discharge plans identified at this time. SW remains available.

## 2017-12-28 NOTE — PT/OT/SLP RE-EVAL
Physical Therapy Re-evaluation    Patient Name:  Marcie Bazan   MRN:  [id number]    Recommendations:     Discharge Recommendations:  home health PT   Discharge Equipment Recommendations:  (will determine DME Needs closer to discharge)   Barriers to discharge: None    Assessment:     Marcie Bazan is a 52 y.o. female admitted with a medical diagnosis of S/P liver transplant.  She presents with the following impairments/functional limitations:  weakness, impaired functional mobilty, gait instability, impaired balance, decreased lower extremity function, decreased safety awareness pt was evaluated 11/26 with dx of decompensated hepatic cirrhosis. Pt was discharged from PT 12/18 2nd to meeting goals. Pt is s/p liver transplant 12/26/17 with new PT orders.    Rehab Prognosis:  good; patient would benefit from acute skilled PT services to address these deficits and reach maximum level of function.      Recent Surgery: Procedure(s) (LRB):  TRANSPLANT-LIVER (N/A) 2 Days Post-Op    Plan:     During this hospitalization, patient to be seen 5 x/week to address the above listed problems via gait training, therapeutic activities, therapeutic exercises  · Plan of Care Expires:  01/25/18   Plan of Care Reviewed with: patient    Subjective     Communicated with nurse prior to session.  Patient found supine upon PT entry to room, agreeable to evaluation.      Social: pt lives with her daughter who has a new born and other family will assist upon discharge. Pt lives in 1 story slab and ambulated with rollator prior to admit. Pt owns rollator.     Chief Complaint: pt had no complaints during treatment.   Patient comments/goals:  To get better and go home.   Pain/Comfort:  · Pain Rating 1: 0/10  · Pain Rating Post-Intervention 1: 0/10    Patients cultural, spiritual, [employer]ism conflicts given the current situation: none      Objective:     Patient found with: arterial line, telemetry, pulse ox (continuous), blood pressure cuff,  stevens catheter, central line, MAXWELL drain (CVP)     General Precautions: Standard, fall   Orthopedic Precautions:N/A   Braces:       Exams:  · Cognitive Exam:  Patient is oriented to Person, Place and Situation  ·   · RLE ROM: WFL  · RLE Strength: WFL  · LLE ROM: WFL  · LLE Strength: WFL    Functional Mobility:  · Pt mod assist rolling to R side and supine to sit. Pt needed verbal cues for hand placement and sequencing for functional mobility.   · Pt was min assist sit to/from stand and SPT bed to chair.     AM-PAC 6 CLICK MOBILITY  Total Score:13         Patient left up in chair with all lines intact and call button in reach.    GOALS:    Physical Therapy Goals        Problem: Physical Therapy Goal    Goal Priority Disciplines Outcome Goal Variances Interventions   Physical Therapy Goal     PT/OT, PT      Description:  Goals to be met by: 18    Patient will increase functional independence with mobility by performin. Supine to sit with Contact Guard Assistance- not met  2. Sit to stand transfer with Contact Guard Assistance - not met  3. Gait  x 150 feet with Contact Guard Assistance using AD if needed.- not met  4. Lower extremity exercise program x15 reps , with supervision -not met                Multidisciplinary Problems (Resolved)        Problem: Physical Therapy Goal    Goal Priority Disciplines Outcome Goal Variances Interventions   Physical Therapy Goal   (Resolved)     PT/OT, PT Outcome(s) achieved     Description:  Goals to be met by: 17    Patient will increase functional independence with mobility by performin. Supine to sit with Set-up [place] MET  2. Sit to supine with Set-up [place] MET  3. Sit to stand transfer with Supervision MET  4. Bed to chair transfer with Supervision using Rolling Walker MET  5. Gait  x 100 feet with Stand-by Assistance using Rolling Walker. MET  Revised: Gait x 300 ft with rollator usage and (S)   6. Lower extremity exercise program x20 reps  per handout, with independence                          History:     Past Medical History:   Diagnosis Date    Alcohol abuse     Alcoholic hepatitis     Anemia of chronic disease     Coagulopathy     End stage liver disease     Hypertension     Hyponatremia     Liver cirrhosis     Liver transplant candidate     Obesity (BMI 30-39.9) 1/5/2016       Past Surgical History:   Procedure Laterality Date    BREAST BIOPSY      CHOLECYSTECTOMY      COLONOSCOPY N/A 12/1/2017    Procedure: COLONOSCOPY;  Surgeon: Filemon Fuentes MD;  Location: [facility] ([address] [place] [address]);  Service: Endoscopy;  Laterality: N/A;    COLONOSCOPY N/A 12/5/2017    Procedure: COLONOSCOPY;  Surgeon: José Chavira MD;  Location: [facility] ([address] [place] [address]);  Service: Endoscopy;  Laterality: N/A;    HYSTERECTOMY      LIVER TRANSPLANT  12/2017       Clinical Decision Making:     History  Co-morbidities and personal factors that may impact the plan of care Examination  Body Structures and Functions, activity limitations and participation restrictions that may impact the plan of care Clinical Presentation   Decision Making/ Complexity Score   Co-morbidities:   [] Time since onset of injury / illness / exacerbation  [] Status of current condition  []Patient's cognitive status and safety concerns    [] Multiple Medical Problems (see med hx)  Personal Factors:   [] Patient's age  [] Prior Level of function   [] Patient's home situation (environment and family support)  [] Patient's level of motivation  [] Expected progression of patient      HISTORY:(criteria)    [] 54892 - no personal factors/history    [] 79943 - has 1-2 personal factor/comorbidity     [] 31848 - has >3 personal factor/comorbidity     Body Regions:  [] Objective examination findings  [] Head     []  Neck  [] Trunk   [] Upper Extremity  [] Lower Extremity    Body Systems:  [] For communication ability, affect, cognition, language, and learning style: the assessment of the ability to make  needs known, consciousness, orientation (person, place, and time), expected emotional /behavioral responses, and learning preferences (eg, learning barriers, education  needs)  [] For the neuromuscular system: a general assessment of gross coordinated movement (eg, balance, gait, locomotion, transfers, and transitions) and motor function  (motor control and motor learning)  [] For the musculoskeletal system: the assessment of gross symmetry, gross range of motion, gross strength, height, and weight  [] For the integumentary system: the assessment of pliability(texture), presence of scar formation, skin color, and skin integrity  [] For cardiovascular/pulmonary system: the assessment of heart rate, respiratory rate, blood pressure, and edema     Activity limitations:    [] Patient's cognitive status and saf ety concerns          [] Status of current condition      [] Weight bearing restriction  [] Cardiopulmunary Restriction    Participation Restrictions:   [] Goals and goal agreement with the patient     [] Rehab potential (prognosis) and probable outcome      Examination of Body System: (criteria)    [] 27289 - addressing 1-2 elements    [] 43472 - addressing a total of 3 or more elements     [] 90383 -  Addressing a total of 4 or more elements         Clinical Presentation: (criteria)  Choose one     On examination of body system using standardized tests and measures patient presents with (CHOOSE ONE) elements from any of the following: body structures and functions, activity limitations, and/or participation restrictions.  Leading to a clinical presentation that is considered (CHOOSE ONE)                              Clinical Decision Making  (Eval Complexity):  Choose One     Time Tracking:     PT Received On: 12/28/17  PT Start Time: 0905     PT Stop Time: 0915  PT Total Time (min): 10 min     Billable Minutes: Re-eval 10 min      Ashlee Logan, PT  12/28/2017

## 2017-12-28 NOTE — PLAN OF CARE
Problem: Patient Care Overview  Goal: Plan of Care Review  Outcome: Ongoing (interventions implemented as appropriate)  Plan of care reviewed with patient and patient's children.  Extubated this morning at approx 1030, currently on room air with 02 sats 100%.  500 cc albumin given for decreased UOP, no improvement noted following administration.  2 packs of platelets given while de-lining patient.  Fem arterial line, introducer w/ double lumen and rapid infuser d/c'd.  Insulin gtt continued, currently at 1.7 units/hr.  MIVF continued at 100 cc/hr.  Patient awake, oriented to self and place.  Liver U/S at bedside this morning. MD updated on patient and orders placed accordingly, VSS throughout the day, see flowsheet for full assessment.

## 2017-12-28 NOTE — PROGRESS NOTES
[facility]  Transplant Surgery  Progress Note      Hospital Day Hospital Day: 36  Post-Op Day 2 Days Post-Op    Admit Diagnosis: ESLD 2/2 Alcoholic cirrhosis, He, Hyponatremia   Procedures: Liver transplant    ORGAN:   LIVER  Disease Etiology: Alcoholic Cirrhosis  Donor Type:    - Brain Death  CDC High Risk:   No  Donor CMV Status:   Donor CMV Status: Negative  Donor HBcAB:   Negative  Donor HCV Status:   Negative  Whole or Partial: Whole Liver  Biliary Anastomosis: End to End  Arterial Anatomy: Standard    HPI:  52 year old female with PMHx of 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 Patient presented to [facility] with generalized weakness. Onset two months ago, progressively worsening. Was admitted to  11/10 for hyponatremia seen on labs in GI office. Discharged on 11/15 and returned on  with 1 week worsening weakness, malaise, lethargy, and decreased appetite. Denied fever/chills, abdominal pain, bright red or black tarry stools, hematemesis or other abnormal bleeding, confusion or disorientation, changes in urination, light-headedness, headache, changes in vision, dyspnea, chest pain or palpitations. Labs demonstrated Na 116 and MELD 32; patient admitted for hyponatremia. Nephrology consulted, recommended fluid restriction , holding diuretics, and prednisode taper that started  (decreased from 40mg QD to 20mg Qd.) Patient transferred to [facility] for revaluation of liver disease. Of note, paracentesis performed , removed 2L, negative for SBP.  Follows with Dr. Souza for cirrhosis. Since early 2017, patient's liver failure progressing indicated by worsening clinical signs (abdominal distention, weakness/fatigue) and MELD. In mid-October, patient's PETH demonstrated EtOH use and she was denied candidacy for liver transplant.  She was then enrolled in in-patient substance abuse program at [facility] where she has remained since early November; per patient, her last EtOH beverage was 11/01/2017. Denies history of GI bleed, SBP, renal disease, or other complications of cirrhosis. Liver biopsy 10/24 demonstrated stage 4 cirrhosis. Records reports EGD in August 2016 that showed small esophageal varices with severe gastritis. Colonoscopy 2015 with 2 polyps and internal hemorrhoids Started on prednisone 40mg QD on 10/25; was on spironolactone, has been held since 11/10 for hypoNa; on lactulose.  Mental status improving with hepatic encephalopathy treatment. Started empiric ceftriaxone given leukocytosis in immunocompromised state. CMV PCR positive, for which ID was consulted, recommended repeating titers in 1 week. Seen by Psychiatry and  who determined she was moderate risk for Liver transplant. EGD/Cscope and mammogram ordered. Colonoscopy not performed due to poor prep, EGD revealed large varices which were banded. IR paracentesis ordered after patient started complaining of abdominal pain. Mammogram performed, IR paracentesis after INR Reversal negative for SBP.  As pt with low risk malignancy, it was decided not to proceed with a breast biopsy.  Patient accepted for Liver transplant. Listed with MELD 35. Patient was slightly confused and altered, which resolved with lactulose enemas.          Subjective:     Interval History:   Transducer in R IJ removed yesterday with significant oozing and hematoma requiering >1.5hours of pressure for resolution, 1pack plts given during removal and another given d/t prolonged oozing. Hematoma decreased in size since yesterday, not expanding, no airway comprise.  Minimal UOP for past 24 hours, 5cc/hr, rising Cr 2.1 form baseline 1.3, CVP 11-12, Net fluid +3L/last 24 hours and +10L since admission. Trial of 40mg Lasix at midnight with out improvement in UOP. Nephrology consulted this AM.  Mental  status still confused, improved alertness, however only oriented to person and place, not time.  AF, HDS       Medications:  Continuous Infusions:   insulin (HUMAN R) infusion (adults) 1.7 Units/hr (12/28/17 0800)     Scheduled Meds:   famotidine  20 mg Oral QHS    heparin (porcine)  5,000 Units Subcutaneous Q8H    methylPREDNISolone sodium succinate  80 mg Intravenous Q12H    Followed by    [START ON 12/29/2017] methylPREDNISolone sodium succinate  60 mg Intravenous Q12H    Followed by    [START ON 12/30/2017] methylPREDNISolone sodium succinate  40 mg Intravenous Q12H    Followed by    [START ON 12/31/2017] methylPREDNISolone sodium succinate  20 mg Intravenous Q12H    Followed by    [START ON 1/1/2018] predniSONE  20 mg Oral Daily    mupirocin  1 g Nasal BID    mycophenolate  1,000 mg Oral BID    NIFEdipine  30 mg Oral Daily    nystatin  500,000 Units Oral QID    pantoprazole  40 mg Oral Daily    posaconazole  300 mg Oral Daily    sodium bicarbonate  50 mEq Intravenous Once    sulfamethoxazole-trimethoprim 400-80mg  1 tablet Oral Daily    tacrolimus  1 mg Oral BID    valGANciclovir  450 mg Oral Daily     PRN Meds:sodium chloride, sodium chloride, calcium gluconate IVPB, calcium gluconate IVPB, calcium gluconate IVPB, dextrose 50%, dextrose 50%, glucagon (human recombinant), glucose, glucose, hydrALAZINE, insulin aspart, magnesium sulfate IVPB, magnesium sulfate IVPB, ondansetron, oxyCODONE, potassium chloride **AND** potassium chloride **AND** potassium chloride, sodium chloride 0.9%     Objective:     Vital Signs (Most Recent):  Temp: 97.5 °F (36.4 °C) (12/28/17 0700)  Pulse: 82 (12/28/17 0854)  Resp: 18 (12/28/17 0854)  BP: (!) 173/98 (12/28/17 0800)  SpO2: 100 % (12/28/17 0854) Vital Signs (24h Range):  Temp:  [97.5 °F (36.4 °C)-98.7 °F (37.1 °C)] 97.5 °F (36.4 °C)  Pulse:  [80-93] 82  Resp:  [16-33] 18  SpO2:  [99 %-100 %] 100 %  BP: (137-177)/(67-98) 173/98  Arterial Line BP:  (138-178)/(59-83) 168/78       Intake/Output Summary (Last 24 hours) at 12/28/17 1044  Last data filed at 12/28/17 0800   Gross per 24 hour   Intake             3059 ml   Output              594 ml   Net             2465 ml       Neuro:  GCS 14, Alert and oriented to person and place, not time, answers qestions appropriatly  Pain control: Dilaudid (not needed overnight), Oxycodone 5mg PO PRN    Cardiac:   PAP: (22-27)/(7-11) 27/11  PAP (Mean):  [14 mmHg-19 mmHg] 19 mmHg  PCWP:  [8 mmHg-11 mmHg] 11 mmHg  CO:  [7 L/min-8 L/min] 7 L/min  CI:  [4.2 L/min/m2-4.8 L/min/m2] 4.2 L/min/m2  Temp:  [97.5 °F (36.4 °C)-98.7 °F (37.1 °C)]   Pulse:  [80-93]   Resp:  [16-33]   BP: (137-177)/(67-98)   SpO2:  [99 %-100 %]   Arterial Line BP: (138-178)/(59-83)   Heart: regular rate and rhythm, S1, S2 normal, no murmur, rub or gallop  Pulses: 2+ and symmetric    Pulmonary:          Recent Labs  Lab 12/28/17  0854   PH 7.459*   PO2 87   PCO2 22.6*   HCO3 16.0*   BE -8       Lungs:  clear to auscultation bilaterally and normal respiratory effort  Chest X-ray: Increased pulmonary congestion, otherwise clear lung fields     Renal:   I & O (Last 24H):  Intake/Output Summary (Last 24 hours) at 12/28/17 1044  Last data filed at 12/28/17 0800   Gross per 24 hour   Intake             3059 ml   Output              594 ml   Net             2465 ml       Recent Labs  Lab 12/27/17  0809 12/27/17  1427 12/27/17  1914 12/28/17  0205   BUN 25*  --  28* 34*   CREATININE 1.4  --  1.6* 2.1*   K 3.8 3.6 3.4* 3.9       Recent Labs  Lab 12/27/17  0809 12/27/17  1427 12/27/17  1914 12/28/17  0205     --  136 135*   K 3.8 3.6 3.4* 3.9     --  109 109   CO2 17*  --  17* 15*   CALCIUM 8.0*  --  8.0* 7.9*   *  --  151* 164*       Recent Labs  Lab 12/26/17  0510  12/26/17  1437 12/26/17  1755 12/27/17  0126 12/28/17  0205   PHOS 2.4*  --  3.8  --   --  3.9   MG 1.4*  < > 1.1* 2.2 1.8 2.5   < > = values in this interval not  displayed.    FEN/GI    Recent Labs  Lab 12/26/17  1437  12/27/17  0126  12/27/17  1015 12/27/17  1427 12/28/17  0205   BILITOT 12.3*  --  4.9*  --   --   --  3.0*   *  314*  < > 288*  288*  < > 137* 91* 54*   *  --  215*  --   --   --  104*   ALKPHOS 151*  --  93  --   --   --  86   ALBUMIN 2.7*  --  4.1  --   --   --  3.5   < > = values in this interval not displayed.    Recent Labs  Lab 12/27/17  1015 12/27/17  1427 12/28/17  0205   INR 1.4* 1.3* 1.2      Nutrition: Enteral, Clear liquid diet   Abdomen: Soft, moderate distention, appropriatly TTP, incision c/d/i   MAXWELL 1 - 230  MAXWELL 2 - 104 --> removed the AM  GI ulcer Prophylaxis: yes    HEME:     Recent Labs  Lab 12/27/17  1015 12/27/17  1427 12/28/17  0205   WBC 15.77* 14.90* 15.75*   HGB 8.9* 8.3* 9.0*   HCT 24.5* 22.6* 25.2*   PLT 47* 101* 136*     DVT Prophylaxis: Pharmacologic yes  Mechanical: yes     Infectious Disease:  Temp:  [97.5 °F (36.4 °C)-98.7 °F (37.1 °C)]   Pulse:  [80-93]   Resp:  [16-33]   BP: (137-177)/(67-98)   SpO2:  [99 %-100 %]   Arterial Line BP: (138-178)/(59-83)   Antibiotics     Start     Stop Route Frequency Ordered    12/26/17 2100  mupirocin 2 % ointment 1 g      12/31 2059 Nasl 2 times daily 12/26/17 1425    12/26/17 1430  sulfamethoxazole-trimethoprim 400-80mg per tablet 1 tablet      -- Oral Daily 12/26/17 1425            Significant Diagnostics:    Liver Us:   Liver allograft is normal in size with the right hepatic lobe spanning approximately 14.3 cm.  The liver demonstrates homogeneous echotexture.  No focal hepatic abnormalities.  There are 2 perihepatic fluid collections identified.  A complex fluid collection along the left hepatic lobe measures approximately 4.6 x 2.3 x 3.8 cm while a collection along the inferior aspect of the right hepatic lobe in Catherine's pouch measures approximately 3.8 x 0.7 x 3.9 cm.    The common duct is nondilated, measuring 2.0 mm.  No dilated intrahepatic radicles are  seen.    Color flow and spectral waveform analysis was performed.  The main portal vein, right portal vein, left portal vein, middle hepatic vein, right hepatic vein, left hepatic vein, SMV, and IVC are patent with proper directional flow.      Anastomosis site of the main hepatic artery demonstrates a peak systolic velocity 102 cm/sec.  Main hepatic artery demonstrates resistive index of 0.83 with normal waveform.  Left hepatic artery shows resistive index 1.0 with normal waveform.  Anterior branch of the right hepatic artery demonstrates resistive index 1.0 with normal waveform.  Posterior branch of the right hepatic artery demonstrates resistive index 0.80 with normal waveform.    There is a small right pleural effusion.      Assessment/Plan:   Marcie Bazan is a 52 y.o. female s/p OLT 12/26     Neuro:   -H/o HE  -AMS, confusion and not oriented to time, improved lethargy from yesterday, will continue to monitor closely  -Encourage OOB and good sleep practise, Will add Seroquel 50 nightly  - Continue current pain control regimen, PO and IV for breakthrough     Resp:  - Extubated (12/27, POD# 1)  - Minimize supplemental O2 requirements  - Encourage IS, deep breathing, cough     CV:  - HDS  -Hypertension - Off Cardene, started nifedipine, also Hydralazine and Labetalol PRN  -Will continue to monitor     Heme: Anemia  - Hgb/Hct 9.0/25.2, unchanged overnight  -Thrombocytopenia resolved - received 2 packs plts yesterday d/t thrombocytopenia during line removal was given one, then prolonged oozing and hematoma afterward, thus another pack transfused, hematoma improved, no further bleeding.  - Continue to trend     ID:  - Afebrile, no leukocytosis  -Will monitor for infectious sources  - Continue opportunistic infection prophylaxis     Immuno: Immunocompromised state  - Continue current immunosuppression regimen - Solumedrol taper, Tacrolimus 1m BID, Cellcept  - Daily Prograf level - will adjust dosage accordingly  -  Continue opportunistic infection prophylaxis     Renal:  -Minimal UOP since POD#1@ 5cc/hr with bump in Cr to 2.1 from baseline 1.3, trial of 40mg Lasix @ 10Pm without improvement in UOP, CVP elevated at 11-12, will give increased Lasix dose @ 80mg  -Metabolic acidosis with respiratory compensation - will give 1 amp bicarb now, And add PO bicarb  -Consulted nephrology - f/u recs  - Rivera in place  - Strict I/Os     FEN/GI: ESLD s/p OLT 12/26, transaminitis, protein calorie malnutrition  - Liver U/S: see above  - Will remove remaining MAXWELL drain today  - Trend LFTs  - Replace lytes PRN  -Advance diet today     Endo: Hyperglycemia  - Endocrine following, appreciate assistance  - Accuchecks  - Insulin gtt --> will switch to SSI per endocrinology protocols once tolerating diet     Dispo:  - Continue ICU care     Lupe Solorio MD  [employer] General Surgery, PGY2  Transplant

## 2017-12-28 NOTE — NURSING
Notified Dr. Medina of K+ 3.6, UOP 5-10 cc/hr, and creatinine 1.4 this morning.  Order to draw a BMP at 2000 and reassess need for replacements at that time.  Will continue to monitor.

## 2017-12-28 NOTE — ASSESSMENT & PLAN NOTE
Neuro: Intubated, sedated    CV: Hypertensive, cardene 1, CVP 7  - Hgb 8.7 from 10.4  - 1amp bicarb given    Pulm: Intubated  - Wean to extubate    Renal: BUN/Cr 23/1.3  - UOP 10-30cc per hr  - Given 2L albumin overnight    GI/ID: MAXWELL drains non-bilious  - Remove one MAXWELL drain  - Post-op Abx: ancef, fluconazole, bactrim, valganciclovir    Immunosuppression:  - prednisone, cellcept, tacrolimus

## 2017-12-28 NOTE — ASSESSMENT & PLAN NOTE
Marcie Bazan is a 52 year old female with pass history of liver cirrhosis secondary to ETOH, who recently had liver transplanat on 12/26/17, which afterward presented with DEREK that mostly secondary to ischemic ATN after surgery, serum creatinine has been worsening and became anuric.     Plan:   - Serum creatinine baseline 0.7-0.9  - Currently with sCr 2.1   - Patient has not improved with urinary output after trial of lasix was given, which patient continue to only make about 5 cc/hr of urine  - Will require to start on SLED for metabolic clearance and removal of excess fluid. Duration 10 hrs, UF to run at 250-300 cc/hr as tolerated by patient, maintain MAP>65, bath will be adjusted to chem.   - Urine sediment presented with few RBCs (no dysmorphic cells), no casts   - Renal ultrasound with decrease perfusion and increase renal cortical echogenicity suggesting chronic renal disease.  No evidence of hydronephrosis, masses or nephrolysiasis   -Avoid nephrotoxic medications and renal dose medications  - Continue with intake and output

## 2017-12-28 NOTE — CONSULTS
[facility]  Nephrology  Consult Note    Patient Name: Marcie Bazan  MRN: [id number]  Admission Date: 11/23/2017  Hospital Length of Stay: 35 days  Attending Provider: Harry Delarosa MD   Primary Care Physician: ODILIA Wall  Principal Problem:S/P liver transplant    Inpatient consult to Nephrology  Consult performed by: TARYN WELDON  Consult ordered by: HUSSAIN BOWEN  Reason for consult: DEREK        Subjective:     HPI: 52 year old female with PMHx of 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 Patient presented to [facility] with generalized weakness. Onset two months ago, progressively worsening. Was admitted to  11/10 for hyponatremia seen on labs in GI office. Discharged on 11/15 and returned on 11/20 with 1 week worsening weakness, malaise, lethargy, and decreased appetite. Denied fever/chills, abdominal pain, bright red or black tarry stools, hematemesis or other abnormal bleeding, confusion or disorientation, changes in urination, light-headedness, headache, changes in vision, dyspnea, chest pain or palpitations. Labs demonstrated Na 116 and MELD 32; patient admitted for hyponatremia. Nephrology consulted, recommended fluid restriction , holding diuretics, and prednisode taper that started 11/23 (decreased from 40mg QD to 20mg Qd.) Patient transferred to [facility] for revaluation of liver disease. Of note, paracentesis performed 11/22, removed 2L, negative for SBP. Liver biopsy 10/24 demonstrated stage 4 cirrhosis. Records reports EGD in August 2016 that showed small esophageal varices with severe gastritis. Patient accepted for Liver transplant. Had liver transplant on 12/26/17. No evident complications during surgery. Was making good amount of urine. On next day presented with DEREK were serum creatinine increased  from 1.2 -->1.6 and urinary output started to decreased. Today sCr continued to trend upward to 2.1, serum bicarbonate decreasing to 15, anuric and chest x ray present moderate edema. This is when nephrology was consulted.     Past Medical History:   Diagnosis Date    Alcohol abuse     Alcoholic hepatitis     Anemia of chronic disease     Coagulopathy     End stage liver disease     Hypertension     Hyponatremia     Liver cirrhosis     Liver transplant candidate     Obesity (BMI 30-39.9) 1/5/2016       Past Surgical History:   Procedure Laterality Date    BREAST BIOPSY      CHOLECYSTECTOMY      COLONOSCOPY N/A 12/1/2017    Procedure: COLONOSCOPY;  Surgeon: Filemon Fuentes MD;  Location: [facility] ([address] [place] [address]);  Service: Endoscopy;  Laterality: N/A;    COLONOSCOPY N/A 12/5/2017    Procedure: COLONOSCOPY;  Surgeon: José Chavira MD;  Location: [facility] ([address] [place] [address]);  Service: Endoscopy;  Laterality: N/A;    HYSTERECTOMY      LIVER TRANSPLANT  12/2017       Review of patient's allergies indicates:   Allergen Reactions    Ferrous sulfate Other (See Comments)     Patient states the pill makes her sick. She stated she would rather have a shot     Current Facility-Administered Medications   Medication Frequency    0.9%  NaCl infusion (for blood administration) Q24H PRN    0.9%  NaCl infusion (for blood administration) Q24H PRN    calcium gluconate 1 g in dextrose 5 % 100 mL IVPB (premix) PRN    calcium gluconate 1 g in dextrose 5 % 100 mL IVPB (premix) PRN    calcium gluconate 1 g in dextrose 5 % 100 mL IVPB (premix) PRN    dextrose 50% injection 12.5 g PRN    dextrose 50% injection 25 g PRN    famotidine tablet 20 mg QHS    glucagon (human recombinant) injection 1 mg PRN    glucose chewable tablet 16 g PRN    glucose chewable tablet 24 g PRN    heparin (porcine) injection 5,000 Units Q8H    hydrALAZINE injection 10 mg Q6H PRN    insulin aspart pen 0-4 Units PRN    insulin regular (Humulin R)  100 Units in sodium chloride 0.9% 100 mL infusion Continuous    labetalol injection 10 mg Q4H PRN    magnesium sulfate 2g in water 50mL IVPB (premix) PRN    magnesium sulfate 2g in water 50mL IVPB (premix) PRN    methylPREDNISolone sodium succinate injection 80 mg Q12H    Followed by    [START ON 12/29/2017] methylPREDNISolone sodium succinate injection 60 mg Q12H    Followed by    [START ON 12/30/2017] methylPREDNISolone sodium succinate injection 40 mg Q12H    Followed by    [START ON 12/31/2017] methylPREDNISolone sodium succinate injection 20 mg Q12H    Followed by    [START ON 1/1/2018] predniSONE tablet 20 mg Daily    mupirocin 2 % ointment 1 g BID    mycophenolate capsule 1,000 mg BID    NIFEdipine 24 hr tablet 30 mg Daily    nystatin 100,000 unit/mL suspension 500,000 Units QID    ondansetron disintegrating tablet 4 mg Q8H PRN    oxyCODONE immediate release tablet 5 mg Q4H PRN    pantoprazole EC tablet 40 mg Daily    posaconazole tablet 300 mg Daily    potassium chloride 40 mEq in 100 mL IVPB (FOR CENTRAL LINE ADMINISTRATION ONLY) PRN    And    potassium chloride 20 mEq in 100 mL IVPB (FOR CENTRAL LINE ADMINISTRATION ONLY) PRN    And    potassium chloride 40 mEq in 100 mL IVPB (FOR CENTRAL LINE ADMINISTRATION ONLY) PRN    QUEtiapine tablet 50 mg QHS    sodium bicarbonate tablet 1,300 mg BID    sodium chloride 0.9% flush 3 mL PRN    sulfamethoxazole-trimethoprim 400-80mg per tablet 1 tablet Daily    tacrolimus capsule 1 mg BID    valGANciclovir tablet 450 mg Daily     Family History     Problem Relation (Age of Onset)    Breast cancer Paternal Aunt    Depression Maternal Grandfather    Diabetes Father, Sister    Hypertension Mother, Father        Social History Main Topics    Smoking status: Never Smoker    Smokeless tobacco: Never Used    Alcohol use No      Comment: Currently admitted to inpatient rehab    Drug use: No    Sexual activity: Not Currently     Review of  Systems  Objective:     Vital Signs (Most Recent):  Temp: 97.3 °F (36.3 °C) (12/28/17 1100)  Pulse: 82 (12/28/17 1300)  Resp: 20 (12/28/17 1300)  BP: (!) 167/95 (12/28/17 1300)  SpO2: 100 % (12/28/17 1300)  O2 Device (Oxygen Therapy): room air (12/28/17 1300) Vital Signs (24h Range):  Temp:  [97.3 °F (36.3 °C)-98.6 °F (37 °C)] 97.3 °F (36.3 °C)  Pulse:  [80-93] 82  Resp:  [15-33] 20  SpO2:  [99 %-100 %] 100 %  BP: (137-177)/(67-98) 167/95  Arterial Line BP: (146-183)/(59-83) 168/80     Weight: 60.5 kg (133 lb 6.1 oz) (12/27/17 0700)  Body mass index is 22.2 kg/m².  Body surface area is 1.67 meters squared.    I/O last 3 completed shifts:  In: 6424 [P.O.:200; I.V.:4060; Blood:2119; NG/GT:45]  Out: 1432 [Urine:565; Drains:867]    Physical Exam   Constitutional: She is oriented to person, place, and time. She appears well-developed and well-nourished. No distress.   HENT:   Head: Normocephalic and atraumatic.   Eyes: Scleral icterus is present.   Neck: Normal range of motion. Neck supple.   Cardiovascular: Normal rate.    No murmur heard.  Pulmonary/Chest: Effort normal. She has decreased breath sounds in the right lower field and the left lower field. She has rales.   Abdominal: Soft. Bowel sounds are normal. She exhibits distension.   Musculoskeletal: Normal range of motion. She exhibits no edema or tenderness.   Neurological: She is alert and oriented to person, place, and time.   Skin: Skin is warm. She is not diaphoretic.   Vitals reviewed.      Significant Labs:  ABGs:   Recent Labs  Lab 12/28/17  0854   PH 7.459*   PCO2 22.6*   HCO3 16.0*   POCSATURATED 97   BE -8     BMP:   Recent Labs  Lab 12/28/17 0205   *      CO2 15*   BUN 34*   CREATININE 2.1*   CALCIUM 7.9*   MG 2.5     CBC:   Recent Labs  Lab 12/28/17 0205   WBC 15.75*   RBC 2.93*   HGB 9.0*   HCT 25.2*   *   MCV 86   MCH 30.7   MCHC 35.7     CMP:   Recent Labs  Lab 12/28/17 0205   *   CALCIUM 7.9*   ALBUMIN 3.5   PROT 5.6*    *   K 3.9   CO2 15*      BUN 34*   CREATININE 2.1*   ALKPHOS 86   *   AST 54*   BILITOT 3.0*     All labs within the past 24 hours have been reviewed.    Assessment/Plan:     DEREK (acute kidney injury)     Marcie Bazan is a 52 year old female with pass history of liver cirrhosis secondary to ETOH, who recently had liver transplanat on 12/26/17, which afterward presented with DEREK that mostly secondary to ischemic ATN after surgery, serum creatinine has been worsening and became anuric.     Plan:   - Serum creatinine baseline 0.7-0.9  - Currently with sCr 2.1   - Patient has not improved with urinary output after trial of lasix was given, which patient continue to only make about 5 cc/hr of urine  - Will require to start on SLED for metabolic clearance and removal of excess fluid. Duration 10 hrs, UF to run at 250-300 cc/hr as tolerated by patient, maintain MAP>65, bath will be adjusted to chem.   - Urine sediment presented with few RBCs (no dysmorphic cells), no casts   - Renal ultrasound with decrease perfusion and increase renal cortical echogenicity suggesting chronic renal disease.  No evidence of hydronephrosis, masses or nephrolysiasis   -Avoid nephrotoxic medications and renal dose medications  - Continue with intake and output             Vito Capellan  Nephrology  Fellow  [facility]    Pager [phone]    Patient seen and examined with Dr Esposito;   I have reviewed and agree with assessment and plan

## 2017-12-28 NOTE — NURSING
Dr. Solorio notified of continued SBP>160 following administration of PRN hydralazine and scheduled PO Procardia.  MD to place orders.  Will continue to monitor.

## 2017-12-28 NOTE — PROGRESS NOTES
[facility]  Liver Transplant  Progress Note    Patient Name: Marcie Bazan  MRN: [id number]  Admission Date: 2017  Hospital Length of Stay: 34 days  Code Status: Full Code  Primary Care Provider: ODILIA Wall  Post-Operative Day: 1    ORGAN:   LIVER  Disease Etiology: Alcoholic Cirrhosis  Donor Type:    - Brain Death  CDC High Risk:   No  Donor CMV Status:   Donor CMV Status: Negative  Donor HBcAB:   Negative  Donor HCV Status:   Negative  Whole or Partial: Whole Liver  Biliary Anastomosis: End to End  Arterial Anatomy: Standard  Subjective:     History of Present Illness:  HPI:  52 year old female with PMHx of 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 Patient presented to [facility] with generalized weakness. Onset two months ago, progressively worsening. Was admitted to  11/10 for hyponatremia seen on labs in GI office. Discharged on 11/15 and returned on  with 1 week worsening weakness, malaise, lethargy, and decreased appetite. Denied fever/chills, abdominal pain, bright red or black tarry stools, hematemesis or other abnormal bleeding, confusion or disorientation, changes in urination, light-headedness, headache, changes in vision, dyspnea, chest pain or palpitations. Labs demonstrated Na 116 and MELD 32; patient admitted for hyponatremia. Nephrology consulted, recommended fluid restriction , holding diuretics, and prednisode taper that started  (decreased from 40mg QD to 20mg Qd.) Patient transferred to [facility] for revaluation of liver disease. Of note, paracentesis performed , removed 2L, negative for SBP.     Follows with Dr. Souza for cirrhosis. Since early 2017, patient's liver failure progressing indicated by worsening clinical signs (abdominal distention, weakness/fatigue) and MELD. In  mid-October, patient's PETH demonstrated EtOH use and she was denied candidacy for liver transplant. She was then enrolled in in-patient substance abuse program at [facility] where she has remained since early November; per patient, her last EtOH beverage was 11/01/2017. Denies history of GI bleed, SBP, renal disease, or other complications of cirrhosis. Liver biopsy 10/24 demonstrated stage 4 cirrhosis. Records reports EGD in August 2016 that showed small esophageal varices with severe gastritis. Colonoscopy 2015 with 2 polyps and internal hemorrhoids Started on prednisone 40mg QD on 10/25; was on spironolactone, has been held since 11/10 for hypoNa; on lactulose.     Hospital Course:  Mental status improving with hepatic encephalopathy treatment. Started empiric ceftriaxone given leukocytosis in immunocompromised state. CMV PCR positive, for which ID was consulted, recommended repeating titers in 1 week. Seen by Psychiatry and  who determined she was moderate risk for Liver transplant. EGD/Cscope and mammogram ordered. Colonoscopy not performed due to poor prep, EGD revealed large varices which were banded. IR paracentesis ordered after patient started complaining of abdominal pain. Mammogram performed, IR paracentesis after INR Reversal negative for SBP.  As pt with low risk malignancy, it was decided not to proceed with a breast biopsy.  Patient accepted for Liver transplant. Listed with MELD 35. Patient was slightly confused and altered, which resolved with lactulose enemas.      Hospital Course:  Interval history: no acute events overnight. Oriented x 3 today. With 4-5 BMs yesterday. Cont with lactulose for HE control and do not skip doses as pt prone to HE. Remains listed for liver transplant with a MELD of 35. Na: 125, will give low dose diuretics, albumin and continue fluid restriction to improve Na. Pt remains stable and ready for transplant. Recent paracentesis negative for SBP. Daily  assessment for paracentesis. Monitor.     No new subjective & objective note has been filed under this hospital service since the last note was generated.    Assessment/Plan:     * Liver transplant 12/26/2017 for ETOH      Neuro: Intubated, sedated    CV: Hypertensive, cardene 1, CVP 7  - Hgb 8.7 from 10.4  - 1amp bicarb given    Pulm: Intubated  - Wean to extubate    Renal: BUN/Cr 23/1.3  - UOP 10-30cc per hr  - Given 2L albumin overnight    GI/ID: MAXWELL drains non-bilious  - Remove one MAXWELL drain  - Post-op Abx: ancef, fluconazole, bactrim, valganciclovir    Immunosuppression:  - prednisone, cellcept, tacrolimus        Immunosuppressed status    Previously on chronic prednisone for alcoholic hepatitis; Prednisone has been weaned off, last dose of 5mg of prednisone was on 12/15          Cytomegalovirus (CMV) viremia              Severe protein-calorie malnutrition              Hypomagnesemia    - Stable. Monitor.           Ascites    - Last paracentesis on 12/19 with 4.6L off.   - Fluid neg for infection.           Anemia of chronic disease                   VTE Risk Mitigation         Ordered     heparin (porcine) injection 5,000 Units  Every 8 hours     Route:  Subcutaneous        12/26/17 1425     Place sequential compression device  Until discontinued      12/26/17 1425     High Risk of VTE  Once      12/26/17 1425     Place FABIOLA hose  Until discontinued      11/23/17 2305          The patients clinical status was discussed at multidisplinary rounds, involving transplant surgery, transplant medicine, pharmacy, nursing, nutrition, and social work    Discharge Planning:  No Patient Care Coordination Note on file.      Reymundo Medina MD  Liver Transplant  [facility]

## 2017-12-28 NOTE — PLAN OF CARE
Problem: Physical Therapy Goal  Goal: Physical Therapy Goal  Goals to be met by: 18    Patient will increase functional independence with mobility by performin. Supine to sit with Contact Guard Assistance- not met  2. Sit to stand transfer with Contact Guard Assistance - not met  3. Gait  x 150 feet with Contact Guard Assistance using AD if needed.- not met  4. Lower extremity exercise program x15 reps , with supervision -not met    Re-eval completed and goals appropriate. Ashlee Logan, PT 2017

## 2017-12-28 NOTE — SUBJECTIVE & OBJECTIVE
Past Medical History:   Diagnosis Date    Alcohol abuse     Alcoholic hepatitis     Anemia of chronic disease     Coagulopathy     End stage liver disease     Hypertension     Hyponatremia     Liver cirrhosis     Liver transplant candidate     Obesity (BMI 30-39.9) 1/5/2016       Past Surgical History:   Procedure Laterality Date    BREAST BIOPSY      CHOLECYSTECTOMY      COLONOSCOPY N/A 12/1/2017    Procedure: COLONOSCOPY;  Surgeon: Filemon Fuentes MD;  Location: [facility] ([address] [place] [address]);  Service: Endoscopy;  Laterality: N/A;    COLONOSCOPY N/A 12/5/2017    Procedure: COLONOSCOPY;  Surgeon: José Chavira MD;  Location: [facility] ([address] [place] [address]);  Service: Endoscopy;  Laterality: N/A;    HYSTERECTOMY      LIVER TRANSPLANT  12/2017       Review of patient's allergies indicates:   Allergen Reactions    Ferrous sulfate Other (See Comments)     Patient states the pill makes her sick. She stated she would rather have a shot     Current Facility-Administered Medications   Medication Frequency    0.9%  NaCl infusion (for blood administration) Q24H PRN    0.9%  NaCl infusion (for blood administration) Q24H PRN    calcium gluconate 1 g in dextrose 5 % 100 mL IVPB (premix) PRN    calcium gluconate 1 g in dextrose 5 % 100 mL IVPB (premix) PRN    calcium gluconate 1 g in dextrose 5 % 100 mL IVPB (premix) PRN    dextrose 50% injection 12.5 g PRN    dextrose 50% injection 25 g PRN    famotidine tablet 20 mg QHS    glucagon (human recombinant) injection 1 mg PRN    glucose chewable tablet 16 g PRN    glucose chewable tablet 24 g PRN    heparin (porcine) injection 5,000 Units Q8H    hydrALAZINE injection 10 mg Q6H PRN    insulin aspart pen 0-4 Units PRN    insulin regular (Humulin R) 100 Units in sodium chloride 0.9% 100 mL infusion Continuous    labetalol injection 10 mg Q4H PRN    magnesium sulfate 2g in water 50mL IVPB (premix) PRN    magnesium sulfate 2g in water 50mL IVPB (premix) PRN     methylPREDNISolone sodium succinate injection 80 mg Q12H    Followed by    [START ON 12/29/2017] methylPREDNISolone sodium succinate injection 60 mg Q12H    Followed by    [START ON 12/30/2017] methylPREDNISolone sodium succinate injection 40 mg Q12H    Followed by    [START ON 12/31/2017] methylPREDNISolone sodium succinate injection 20 mg Q12H    Followed by    [START ON 1/1/2018] predniSONE tablet 20 mg Daily    mupirocin 2 % ointment 1 g BID    mycophenolate capsule 1,000 mg BID    NIFEdipine 24 hr tablet 30 mg Daily    nystatin 100,000 unit/mL suspension 500,000 Units QID    ondansetron disintegrating tablet 4 mg Q8H PRN    oxyCODONE immediate release tablet 5 mg Q4H PRN    pantoprazole EC tablet 40 mg Daily    posaconazole tablet 300 mg Daily    potassium chloride 40 mEq in 100 mL IVPB (FOR CENTRAL LINE ADMINISTRATION ONLY) PRN    And    potassium chloride 20 mEq in 100 mL IVPB (FOR CENTRAL LINE ADMINISTRATION ONLY) PRN    And    potassium chloride 40 mEq in 100 mL IVPB (FOR CENTRAL LINE ADMINISTRATION ONLY) PRN    QUEtiapine tablet 50 mg QHS    sodium bicarbonate tablet 1,300 mg BID    sodium chloride 0.9% flush 3 mL PRN    sulfamethoxazole-trimethoprim 400-80mg per tablet 1 tablet Daily    tacrolimus capsule 1 mg BID    valGANciclovir tablet 450 mg Daily     Family History     Problem Relation (Age of Onset)    Breast cancer Paternal Aunt    Depression Maternal Grandfather    Diabetes Father, Sister    Hypertension Mother, Father        Social History Main Topics    Smoking status: Never Smoker    Smokeless tobacco: Never Used    Alcohol use No      Comment: Currently admitted to inpatient rehab    Drug use: No    Sexual activity: Not Currently     Review of Systems  Objective:     Vital Signs (Most Recent):  Temp: 97.3 °F (36.3 °C) (12/28/17 1100)  Pulse: 82 (12/28/17 1300)  Resp: 20 (12/28/17 1300)  BP: (!) 167/95 (12/28/17 1300)  SpO2: 100 % (12/28/17 1300)  O2 Device (Oxygen  Therapy): room air (12/28/17 1300) Vital Signs (24h Range):  Temp:  [97.3 °F (36.3 °C)-98.6 °F (37 °C)] 97.3 °F (36.3 °C)  Pulse:  [80-93] 82  Resp:  [15-33] 20  SpO2:  [99 %-100 %] 100 %  BP: (137-177)/(67-98) 167/95  Arterial Line BP: (146-183)/(59-83) 168/80     Weight: 60.5 kg (133 lb 6.1 oz) (12/27/17 0700)  Body mass index is 22.2 kg/m².  Body surface area is 1.67 meters squared.    I/O last 3 completed shifts:  In: 6424 [P.O.:200; I.V.:4060; Blood:2119; NG/GT:45]  Out: 1432 [Urine:565; Drains:867]    Physical Exam   Constitutional: She is oriented to person, place, and time. She appears well-developed and well-nourished. No distress.   HENT:   Head: Normocephalic and atraumatic.   Eyes: Scleral icterus is present.   Neck: Normal range of motion. Neck supple.   Cardiovascular: Normal rate.    No murmur heard.  Pulmonary/Chest: Effort normal. She has decreased breath sounds in the right lower field and the left lower field. She has rales.   Abdominal: Soft. Bowel sounds are normal. She exhibits distension.   Musculoskeletal: Normal range of motion. She exhibits no edema or tenderness.   Neurological: She is alert and oriented to person, place, and time.   Skin: Skin is warm. She is not diaphoretic.   Vitals reviewed.      Significant Labs:  ABGs:   Recent Labs  Lab 12/28/17  0854   PH 7.459*   PCO2 22.6*   HCO3 16.0*   POCSATURATED 97   BE -8     BMP:   Recent Labs  Lab 12/28/17  0205   *      CO2 15*   BUN 34*   CREATININE 2.1*   CALCIUM 7.9*   MG 2.5     CBC:   Recent Labs  Lab 12/28/17  0205   WBC 15.75*   RBC 2.93*   HGB 9.0*   HCT 25.2*   *   MCV 86   MCH 30.7   MCHC 35.7     CMP:   Recent Labs  Lab 12/28/17  0205   *   CALCIUM 7.9*   ALBUMIN 3.5   PROT 5.6*   *   K 3.9   CO2 15*      BUN 34*   CREATININE 2.1*   ALKPHOS 86   *   AST 54*   BILITOT 3.0*     All labs within the past 24 hours have been reviewed.

## 2017-12-28 NOTE — PROGRESS NOTES
[facility]  Liver Transplant  Progress Note    Patient Name: Marcie Bazan  MRN: [id number]  Admission Date: 2017  Hospital Length of Stay: 34 days  Code Status: Full Code  Primary Care Provider: ODILIA Wall  Post-Operative Day: 1    ORGAN:   LIVER  Disease Etiology: Alcoholic Cirrhosis  Donor Type:    - Brain Death  CDC High Risk:   No  Donor CMV Status:   Donor CMV Status: Negative  Donor HBcAB:   Negative  Donor HCV Status:   Negative  Whole or Partial: Whole Liver  Biliary Anastomosis: End to End  Arterial Anatomy: Standard  Subjective:     History of Present Illness:  HPI:  52 year old female with PMHx of 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 Patient presented to [facility] with generalized weakness. Onset two months ago, progressively worsening. Was admitted to  11/10 for hyponatremia seen on labs in GI office. Discharged on 11/15 and returned on  with 1 week worsening weakness, malaise, lethargy, and decreased appetite. Denied fever/chills, abdominal pain, bright red or black tarry stools, hematemesis or other abnormal bleeding, confusion or disorientation, changes in urination, light-headedness, headache, changes in vision, dyspnea, chest pain or palpitations. Labs demonstrated Na 116 and MELD 32; patient admitted for hyponatremia. Nephrology consulted, recommended fluid restriction , holding diuretics, and prednisode taper that started  (decreased from 40mg QD to 20mg Qd.) Patient transferred to [facility] for revaluation of liver disease. Of note, paracentesis performed , removed 2L, negative for SBP.     Follows with Dr. Souza for cirrhosis. Since early 2017, patient's liver failure progressing indicated by worsening clinical signs (abdominal distention, weakness/fatigue) and MELD. In  mid-October, patient's PETH demonstrated EtOH use and she was denied candidacy for liver transplant. She was then enrolled in in-patient substance abuse program at [facility] where she has remained since early November; per patient, her last EtOH beverage was 11/01/2017. Denies history of GI bleed, SBP, renal disease, or other complications of cirrhosis. Liver biopsy 10/24 demonstrated stage 4 cirrhosis. Records reports EGD in August 2016 that showed small esophageal varices with severe gastritis. Colonoscopy 2015 with 2 polyps and internal hemorrhoids Started on prednisone 40mg QD on 10/25; was on spironolactone, has been held since 11/10 for hypoNa; on lactulose.     Hospital Course:  Mental status improving with hepatic encephalopathy treatment. Started empiric ceftriaxone given leukocytosis in immunocompromised state. CMV PCR positive, for which ID was consulted, recommended repeating titers in 1 week. Seen by Psychiatry and  who determined she was moderate risk for Liver transplant. EGD/Cscope and mammogram ordered. Colonoscopy not performed due to poor prep, EGD revealed large varices which were banded. IR paracentesis ordered after patient started complaining of abdominal pain. Mammogram performed, IR paracentesis after INR Reversal negative for SBP.  As pt with low risk malignancy, it was decided not to proceed with a breast biopsy.  Patient accepted for Liver transplant. Listed with MELD 35. Patient was slightly confused and altered, which resolved with lactulose enemas.      Hospital Course:  Interval history: no acute events overnight. Oriented x 3 today. With 4-5 BMs yesterday. Cont with lactulose for HE control and do not skip doses as pt prone to HE. Remains listed for liver transplant with a MELD of 35. Na: 125, will give low dose diuretics, albumin and continue fluid restriction to improve Na. Pt remains stable and ready for transplant. Recent paracentesis negative for SBP. Daily  assessment for paracentesis. Monitor.     Interval History: Overnight CVP 5-8 received 2L albumin. UOP decreased.    Scheduled Meds:   custom IVPB builder  1 g Intravenous Q24H    lactulose  30 g Oral TID    pantoprazole  40 mg Oral Daily    pramoxine-hydrocortisone   Rectal BID    rifAXImin  550 mg Oral BID    vancomycin (VANCOCIN) IVPB  750 mg Intravenous Q24H    zinc sulfate  220 mg Oral BID     Continuous Infusions:  PRN Meds:sodium chloride, sodium chloride, sodium chloride, ampicillin-sulbactim (UNASYN) IVPB, GI cocktail (mylanta 30 mL, lidocaine 2 % viscous 10 mL, dicyclomine 10 mL) 50 mL, mupirocin, ondansetron, ondansetron, simethicone, sodium chloride 0.9%, sodium chloride 0.9%, sodium chloride 0.9%    Review of Systems   Unable to perform ROS: Intubated     Objective:     Vital Signs (Most Recent):  Temp: 98 °F (36.7 °C) (12/25/17 0809)  Pulse: 89 (12/25/17 0809)  Resp: 16 (12/25/17 0809)  BP: 112/62 (12/25/17 0809)  SpO2: 100 % (12/25/17 0809) Vital Signs (24h Range):  Temp:  [97.6 °F (36.4 °C)-98.8 °F (37.1 °C)] 98 °F (36.7 °C)  Pulse:  [87-92] 89  Resp:  [16-19] 16  SpO2:  [100 %] 100 %  BP: (101-112)/(50-62) 112/62     Weight: 53.3 kg (117 lb 8.1 oz)  Body mass index is 19.55 kg/m².    Intake/Output - Last 3 Shifts       12/23 0700 - 12/24 0659 12/24 0700 - 12/25 0659 12/25 0700 - 12/26 0659    P.O. 320 100     IV Piggyback 50 50     Total Intake(mL/kg) 370 (6.9) 150 (2.8)     Urine (mL/kg/hr) 0 (0)      Emesis/NG output 0 (0)      Stool 0 (0)      Total Output 0        Net +370 +150             Urine Occurrence 3 x 3 x     Stool Occurrence 9 x 4 x     Emesis Occurrence 0 x            Physical Exam   Constitutional: She appears well-developed.   Chronically ill-appearing. Malnourished. Temporal wasting.     HENT:   Head: Normocephalic and atraumatic.   Eyes: Scleral icterus is present.   Cardiovascular: Normal rate and regular rhythm.    Pulmonary/Chest: Effort normal.   Abdominal: Soft.   JPs  non-bilious, ss  Incision with bandage   Musculoskeletal: She exhibits no edema.   Neurological: She is alert.   Skin: Skin is warm and dry. No rash noted.   Psychiatric: Her mood appears not anxious.   Nursing note and vitals reviewed.      Laboratory:  Immunosuppressants         Stop Route Frequency     mycophenolate capsule 1,000 mg      -- Oral 2 times daily     tacrolimus capsule 1 mg      -- Oral 2 times daily        CBC:     Recent Labs  Lab 12/27/17  1427   WBC 14.90*   RBC 2.63*   HGB 8.3*   HCT 22.6*   *   MCV 86   MCH 31.6*   MCHC 36.7*     CMP:   Recent Labs  Lab 12/27/17  0126  12/27/17  1427 12/27/17  1914   *  < >  --  151*   CALCIUM 8.0*  < >  --  8.0*   ALBUMIN 4.1  --   --   --    PROT 5.9*  --   --   --    *  < >  --  136   K 3.9  < > 3.6 3.4*   CO2 16*  < >  --  17*     < >  --  109   BUN 23*  < >  --  28*   CREATININE 1.3  < >  --  1.6*   ALKPHOS 93  --   --   --    *  --   --   --    *  288*  < > 91*  --    BILITOT 4.9*  --   --   --    < > = values in this interval not displayed.  Coagulation:   Recent Labs  Lab 12/27/17  0126  12/27/17  1427   INR 1.4*  < > 1.3*   APTT 44.8*  --   --    < > = values in this interval not displayed.  Labs within the past 24 hours have been reviewed.      Assessment/Plan:     * Liver transplant 12/26/2017 for ETOH      Neuro: Intubated, sedated    CV: Hypertensive, cardene 1, CVP 7  - Hgb 8.7 from 10.4  - 1amp bicarb given    Pulm: Intubated  - Wean to extubate    Renal: BUN/Cr 23/1.3  - UOP 10-30cc per hr  - Given 2L albumin overnight    GI/ID: MAXWELL drains non-bilious  - Post-op Abx: ancef, fluconazole, bactrim, valganciclovir    Immunosuppression:  - prednisone, cellcept, tacrolimus        Immunosuppressed status    Previously on chronic prednisone for alcoholic hepatitis; Prednisone has been weaned off, last dose of 5mg of prednisone was on 12/15          Cytomegalovirus (CMV) viremia              Severe protein-calorie  malnutrition              Hypomagnesemia    - Stable. Monitor.           Ascites    - Last paracentesis on 12/19 with 4.6L off.   - Fluid neg for infection.           Anemia of chronic disease                   VTE Risk Mitigation         Ordered     heparin (porcine) injection 5,000 Units  Every 8 hours     Route:  Subcutaneous        12/26/17 1425     Place sequential compression device  Until discontinued      12/26/17 1425     High Risk of VTE  Once      12/26/17 1425     Place FABIOLA hose  Until discontinued      11/23/17 2305          The patients clinical status was discussed at multidisplinary rounds, involving transplant surgery, transplant medicine, pharmacy, nursing, nutrition, and social work    Discharge Planning:  No Patient Care Coordination Note on file.      Reymundo Medina MD  Liver Transplant  [facility]

## 2017-12-28 NOTE — ASSESSMENT & PLAN NOTE
Neuro: Intubated, sedated    CV: Hypertensive, cardene 1, CVP 7  - Hgb 8.7 from 10.4  - 1amp bicarb given    Pulm: Intubated  - Wean to extubate    Renal: BUN/Cr 23/1.3  - UOP 10-30cc per hr  - Given 2L albumin overnight    GI/ID: MAXWELL drains non-bilious  - Post-op Abx: ancef, fluconazole, bactrim, valganciclovir    Immunosuppression:  - prednisone, cellcept, tacrolimus

## 2017-12-28 NOTE — HPI
52 year old female with PMHx of 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 Patient presented to [facility] with generalized weakness. Onset two months ago, progressively worsening. Was admitted to  11/10 for hyponatremia seen on labs in GI office. Discharged on 11/15 and returned on 11/20 with 1 week worsening weakness, malaise, lethargy, and decreased appetite. Denied fever/chills, abdominal pain, bright red or black tarry stools, hematemesis or other abnormal bleeding, confusion or disorientation, changes in urination, light-headedness, headache, changes in vision, dyspnea, chest pain or palpitations. Labs demonstrated Na 116 and MELD 32; patient admitted for hyponatremia. Nephrology consulted, recommended fluid restriction , holding diuretics, and prednisode taper that started 11/23 (decreased from 40mg QD to 20mg Qd.) Patient transferred to [facility] for revaluation of liver disease. Of note, paracentesis performed 11/22, removed 2L, negative for SBP. Liver biopsy 10/24 demonstrated stage 4 cirrhosis. Records reports EGD in August 2016 that showed small esophageal varices with severe gastritis. Patient accepted for Liver transplant. Had liver transplant on 12/26/17. No evident complications during surgery. Was making good amount of urine. On next day presented with DEREK were serum creatinine increased from 1.2 -->1.6 and urinary output started to decreased. Today sCr continued to trend upward to 2.1, serum bicarbonate decreasing to 15, anuric and chest x ray present moderate edema. This is when nephrology was consulted.

## 2017-12-28 NOTE — ASSESSMENT & PLAN NOTE
BG goal 140-180.     BG monitoring q4h  Low dose correction scale  Continue transition at 1.7 u/hr.    Discharge planning: TBD

## 2017-12-28 NOTE — SUBJECTIVE & OBJECTIVE
"Interval HPI:   On transition 1.7 u/hr overnight.  Afebrile.  No hypoglycemia.   On steroid taper.        BP (!) 173/98 (BP Location: Left arm, Patient Position: Lying)   Pulse 82   Temp 97.5 °F (36.4 °C) (Oral)   Resp 18   Ht 5' 5" (1.651 m)   Wt 60.5 kg (133 lb 6.1 oz)   LMP 01/01/1985 (Approximate) Comment: 1985  SpO2 100%   Breastfeeding? No   BMI 22.20 kg/m²     Labs Reviewed and Include      Recent Labs  Lab 12/28/17  0205   *   CALCIUM 7.9*   ALBUMIN 3.5   PROT 5.6*   *   K 3.9   CO2 15*      BUN 34*   CREATININE 2.1*   ALKPHOS 86   *   AST 54*   BILITOT 3.0*     Lab Results   Component Value Date    WBC 15.75 (H) 12/28/2017    HGB 9.0 (L) 12/28/2017    HCT 25.2 (L) 12/28/2017    MCV 86 12/28/2017     (L) 12/28/2017     No results for input(s): TSH, FREET4 in the last 168 hours.  Lab Results   Component Value Date    HGBA1C <4.0 (A) 10/20/2017       Nutritional status:   Body mass index is 22.2 kg/m².  Lab Results   Component Value Date    ALBUMIN 3.5 12/28/2017    ALBUMIN 4.1 12/27/2017    ALBUMIN 2.7 (L) 12/26/2017     Lab Results   Component Value Date    PREALBUMIN 4 (L) 11/27/2017    PREALBUMIN 3 (L) 10/20/2017       Estimated Creatinine Clearance: 27.6 mL/min (based on SCr of 2.1 mg/dL (H)).    Accu-Checks  Recent Labs      12/27/17   0510  12/27/17   0603  12/27/17   0726  12/27/17   0808  12/27/17   1221  12/27/17   1610  12/27/17   1918  12/28/17   0027  12/28/17   0425  12/28/17   0746   POCTGLUCOSE  167*  168*  161*  153*  188*  172*  165*  160*  170*  164*       Current Medications and/or Treatments Impacting Glycemic Control  Immunotherapy:  Immunosuppressants         Stop Route Frequency     mycophenolate capsule 1,000 mg      -- Oral 2 times daily     tacrolimus capsule 1 mg      -- Oral 2 times daily        Steroids:   Hormones     Start     Stop Route Frequency Ordered    01/01/18 0900  predniSONE tablet 20 mg  (methylprednisolone taper panel)      -- " Oral Daily 12/26/17 1425    12/31/17 0900  methylPREDNISolone sodium succinate injection 20 mg  (methylprednisolone taper panel)      01/01 0859 IV Every 12 hours 12/26/17 1425    12/30/17 0900  methylPREDNISolone sodium succinate injection 40 mg  (methylprednisolone taper panel)      12/31 0859 IV Every 12 hours 12/26/17 1425    12/29/17 0900  methylPREDNISolone sodium succinate injection 60 mg  (methylprednisolone taper panel)      12/30 0859 IV Every 12 hours 12/26/17 1425    12/28/17 0900  methylPREDNISolone sodium succinate injection 80 mg  (methylprednisolone taper panel)      12/29 0859 IV Every 12 hours 12/26/17 1425        Pressors:    Autonomic Drugs     None          On transition at 1. 7 u/hr  Low dose correction scale  Hyperglycemia/Diabetes Medications: Antihyperglycemics     Start     Stop Route Frequency Ordered    12/27/17 0903  insulin aspart pen 0-4 Units      -- SubQ As needed (PRN) 12/27/17 0803    12/27/17 0803  insulin regular (Humulin R) 100 Units in sodium chloride 0.9% 100 mL infusion      -- IV Continuous 12/27/17 0803

## 2017-12-28 NOTE — PROGRESS NOTES
"[facility]  Endocrinology  Progress Note    Admit Date: 11/23/2017     Reason for Consult: Management ofHyperglycemia     Surgical Procedure and Date: OLT 12/26/2017    HPI:   Patient is a 52 y.o. female with ESLD 2/2 decompensated alcoholic hepatitis who is now s/p liver transplant. Endocrine consulted for BG management. No personal h/o DM, + FH in father and sister per Epic review.     Interval HPI:   On transition 1.7 u/hr overnight.  Afebrile.  No hypoglycemia.   On steroid taper.        BP (!) 173/98 (BP Location: Left arm, Patient Position: Lying)   Pulse 82   Temp 97.5 °F (36.4 °C) (Oral)   Resp 18   Ht 5' 5" (1.651 m)   Wt 60.5 kg (133 lb 6.1 oz)   LMP 01/01/1985 (Approximate) Comment: 1985  SpO2 100%   Breastfeeding? No   BMI 22.20 kg/m²       Labs Reviewed and Include      Recent Labs  Lab 12/28/17  0205   *   CALCIUM 7.9*   ALBUMIN 3.5   PROT 5.6*   *   K 3.9   CO2 15*      BUN 34*   CREATININE 2.1*   ALKPHOS 86   *   AST 54*   BILITOT 3.0*     Lab Results   Component Value Date    WBC 15.75 (H) 12/28/2017    HGB 9.0 (L) 12/28/2017    HCT 25.2 (L) 12/28/2017    MCV 86 12/28/2017     (L) 12/28/2017     No results for input(s): TSH, FREET4 in the last 168 hours.  Lab Results   Component Value Date    HGBA1C <4.0 (A) 10/20/2017       Nutritional status:   Body mass index is 22.2 kg/m².  Lab Results   Component Value Date    ALBUMIN 3.5 12/28/2017    ALBUMIN 4.1 12/27/2017    ALBUMIN 2.7 (L) 12/26/2017     Lab Results   Component Value Date    PREALBUMIN 4 (L) 11/27/2017    PREALBUMIN 3 (L) 10/20/2017       Estimated Creatinine Clearance: 27.6 mL/min (based on SCr of 2.1 mg/dL (H)).    Accu-Checks  Recent Labs      12/27/17   0510  12/27/17   0603  12/27/17   0726  12/27/17   0808  12/27/17   1221  12/27/17   1610  12/27/17   1918  12/28/17   0027  12/28/17   0425  12/28/17   0746   POCTGLUCOSE  167*  168*  161*  153*  188*  172*  165*  160*  170*  " 164*       Current Medications and/or Treatments Impacting Glycemic Control  Immunotherapy:  Immunosuppressants         Stop Route Frequency     mycophenolate capsule 1,000 mg      -- Oral 2 times daily     tacrolimus capsule 1 mg      -- Oral 2 times daily        Steroids:   Hormones     Start     Stop Route Frequency Ordered    01/01/18 0900  predniSONE tablet 20 mg  (methylprednisolone taper panel)      -- Oral Daily 12/26/17 1425    12/31/17 0900  methylPREDNISolone sodium succinate injection 20 mg  (methylprednisolone taper panel)      01/01 0859 IV Every 12 hours 12/26/17 1425    12/30/17 0900  methylPREDNISolone sodium succinate injection 40 mg  (methylprednisolone taper panel)      12/31 0859 IV Every 12 hours 12/26/17 1425    12/29/17 0900  methylPREDNISolone sodium succinate injection 60 mg  (methylprednisolone taper panel)      12/30 0859 IV Every 12 hours 12/26/17 1425    12/28/17 0900  methylPREDNISolone sodium succinate injection 80 mg  (methylprednisolone taper panel)      12/29 0859 IV Every 12 hours 12/26/17 1425        Pressors:    Autonomic Drugs     None          On transition at 1. 7 u/hr  Low dose correction scale  Hyperglycemia/Diabetes Medications: Antihyperglycemics     Start     Stop Route Frequency Ordered    12/27/17 0903  insulin aspart pen 0-4 Units      -- SubQ As needed (PRN) 12/27/17 0803    12/27/17 0803  insulin regular (Humulin R) 100 Units in sodium chloride 0.9% 100 mL infusion      -- IV Continuous 12/27/17 0803          ASSESSMENT and PLAN    * Liver transplant 12/26/2017 for ETOH    Avoid hypoglycemia.        Hyperglycemia    BG goal 140-180.     BG monitoring q4h  Low dose correction scale  Continue transition at 1.7 u/hr.    Discharge planning: TBD        Long-term use of immunosuppressant medication    May increase insulin resistance and BG readings         Immunosuppression    Increases insulin resistance over time.          NAFLD (nonalcoholic fatty liver disease)    S/p  liver transplant            Adela Mosqueda, NGUYỄN, FNP  Endocrinology  [facility]

## 2017-12-29 PROBLEM — K74.60 DECOMPENSATED HEPATIC CIRRHOSIS: Status: ACTIVE | Noted: 2017-12-29

## 2017-12-29 PROBLEM — K72.90 DECOMPENSATED HEPATIC CIRRHOSIS: Status: ACTIVE | Noted: 2017-12-29

## 2017-12-29 LAB
ALBUMIN SERPL BCP-MCNC: 3 G/DL
ALP SERPL-CCNC: 93 U/L
ALT SERPL W/O P-5'-P-CCNC: 84 U/L
ANION GAP SERPL CALC-SCNC: 6 MMOL/L
ANION GAP SERPL CALC-SCNC: 8 MMOL/L
ANION GAP SERPL CALC-SCNC: 8 MMOL/L
APTT BLDCRRT: 31.1 SEC
AST SERPL-CCNC: 43 U/L
BACTERIA #/AREA URNS AUTO: ABNORMAL /HPF
BASOPHILS # BLD AUTO: 0.01 K/UL
BASOPHILS NFR BLD: 0.1 %
BILIRUB DIRECT SERPL-MCNC: 2.2 MG/DL
BILIRUB SERPL-MCNC: 3.1 MG/DL
BILIRUB UR QL STRIP: ABNORMAL
BLD PROD TYP BPU: NORMAL
BLOOD UNIT EXPIRATION DATE: NORMAL
BLOOD UNIT TYPE CODE: 6200
BLOOD UNIT TYPE: NORMAL
BUN SERPL-MCNC: 15 MG/DL
BUN SERPL-MCNC: 15 MG/DL
BUN SERPL-MCNC: 20 MG/DL
CALCIUM SERPL-MCNC: 7.6 MG/DL
CALCIUM SERPL-MCNC: 7.6 MG/DL
CALCIUM SERPL-MCNC: 7.8 MG/DL
CHLORIDE SERPL-SCNC: 100 MMOL/L
CHLORIDE SERPL-SCNC: 105 MMOL/L
CHLORIDE SERPL-SCNC: 105 MMOL/L
CLARITY UR REFRACT.AUTO: ABNORMAL
CO2 SERPL-SCNC: 25 MMOL/L
CO2 SERPL-SCNC: 25 MMOL/L
CO2 SERPL-SCNC: 29 MMOL/L
CODING SYSTEM: NORMAL
COLOR UR AUTO: ABNORMAL
CREAT SERPL-MCNC: 1.1 MG/DL
CREAT SERPL-MCNC: 1.1 MG/DL
CREAT SERPL-MCNC: 1.6 MG/DL
DIFFERENTIAL METHOD: ABNORMAL
DISPENSE STATUS: NORMAL
EOSINOPHIL # BLD AUTO: 0 K/UL
EOSINOPHIL NFR BLD: 0 %
ERYTHROCYTE [DISTWIDTH] IN BLOOD BY AUTOMATED COUNT: 15.9 %
EST. GFR  (AFRICAN AMERICAN): 41.8 ML/MIN/1.73 M^2
EST. GFR  (AFRICAN AMERICAN): >60 ML/MIN/1.73 M^2
EST. GFR  (AFRICAN AMERICAN): >60 ML/MIN/1.73 M^2
EST. GFR  (NON AFRICAN AMERICAN): 36.3 ML/MIN/1.73 M^2
EST. GFR  (NON AFRICAN AMERICAN): 57.1 ML/MIN/1.73 M^2
EST. GFR  (NON AFRICAN AMERICAN): 57.1 ML/MIN/1.73 M^2
GGT SERPL-CCNC: 92 U/L
GLUCOSE SERPL-MCNC: 109 MG/DL
GLUCOSE SERPL-MCNC: 109 MG/DL
GLUCOSE SERPL-MCNC: 110 MG/DL
GLUCOSE UR QL STRIP: NEGATIVE
HCT VFR BLD AUTO: 23.8 %
HGB BLD-MCNC: 8.6 G/DL
HGB UR QL STRIP: ABNORMAL
HYALINE CASTS UR QL AUTO: 12 /LPF
IMM GRANULOCYTES # BLD AUTO: 0.14 K/UL
IMM GRANULOCYTES NFR BLD AUTO: 1.4 %
INR PPP: 1.3
KETONES UR QL STRIP: NEGATIVE
LEUKOCYTE ESTERASE UR QL STRIP: ABNORMAL
LYMPHOCYTES # BLD AUTO: 1.3 K/UL
LYMPHOCYTES NFR BLD: 13.3 %
MAGNESIUM SERPL-MCNC: 1.9 MG/DL
MAGNESIUM SERPL-MCNC: 2.3 MG/DL
MCH RBC QN AUTO: 30.8 PG
MCHC RBC AUTO-ENTMCNC: 36.1 G/DL
MCV RBC AUTO: 85 FL
MICROSCOPIC COMMENT: ABNORMAL
MONOCYTES # BLD AUTO: 0.6 K/UL
MONOCYTES NFR BLD: 6.2 %
NEUTROPHILS # BLD AUTO: 7.9 K/UL
NEUTROPHILS NFR BLD: 79 %
NITRITE UR QL STRIP: NEGATIVE
NRBC BLD-RTO: 1 /100 WBC
PH UR STRIP: 5 [PH] (ref 5–8)
PHOSPHATE SERPL-MCNC: 2.5 MG/DL
PHOSPHATE SERPL-MCNC: 3 MG/DL
PLATELET # BLD AUTO: 80 K/UL
PMV BLD AUTO: 10.1 FL
POCT GLUCOSE: 105 MG/DL (ref 70–110)
POCT GLUCOSE: 107 MG/DL (ref 70–110)
POCT GLUCOSE: 123 MG/DL (ref 70–110)
POCT GLUCOSE: 130 MG/DL (ref 70–110)
POCT GLUCOSE: 133 MG/DL (ref 70–110)
POCT GLUCOSE: 98 MG/DL (ref 70–110)
POTASSIUM SERPL-SCNC: 4.2 MMOL/L
POTASSIUM SERPL-SCNC: 4.2 MMOL/L
POTASSIUM SERPL-SCNC: 4.3 MMOL/L
PROT SERPL-MCNC: 5.2 G/DL
PROT UR QL STRIP: ABNORMAL
PROTHROMBIN TIME: 13 SEC
RBC # BLD AUTO: 2.79 M/UL
RBC #/AREA URNS AUTO: 41 /HPF (ref 0–4)
SODIUM SERPL-SCNC: 135 MMOL/L
SODIUM SERPL-SCNC: 138 MMOL/L
SODIUM SERPL-SCNC: 138 MMOL/L
SP GR UR STRIP: 1.02 (ref 1–1.03)
SQUAMOUS #/AREA URNS AUTO: 3 /HPF
TACROLIMUS BLD-MCNC: 9.9 NG/ML
TRANS ERYTHROCYTES VOL PATIENT: NORMAL ML
URN SPEC COLLECT METH UR: ABNORMAL
UROBILINOGEN UR STRIP-ACNC: NEGATIVE EU/DL
WBC # BLD AUTO: 10 K/UL
WBC #/AREA URNS AUTO: 9 /HPF (ref 0–5)
YEAST UR QL AUTO: ABNORMAL

## 2017-12-29 PROCEDURE — 25000003 PHARM REV CODE 250: Performed by: STUDENT IN AN ORGANIZED HEALTH CARE EDUCATION/TRAINING PROGRAM

## 2017-12-29 PROCEDURE — 83735 ASSAY OF MAGNESIUM: CPT | Mod: 91

## 2017-12-29 PROCEDURE — 80197 ASSAY OF TACROLIMUS: CPT

## 2017-12-29 PROCEDURE — 97116 GAIT TRAINING THERAPY: CPT

## 2017-12-29 PROCEDURE — 85610 PROTHROMBIN TIME: CPT

## 2017-12-29 PROCEDURE — 63600175 PHARM REV CODE 636 W HCPCS: Performed by: TRANSPLANT SURGERY

## 2017-12-29 PROCEDURE — 63600175 PHARM REV CODE 636 W HCPCS: Performed by: STUDENT IN AN ORGANIZED HEALTH CARE EDUCATION/TRAINING PROGRAM

## 2017-12-29 PROCEDURE — 94761 N-INVAS EAR/PLS OXIMETRY MLT: CPT

## 2017-12-29 PROCEDURE — 82248 BILIRUBIN DIRECT: CPT

## 2017-12-29 PROCEDURE — 80053 COMPREHEN METABOLIC PANEL: CPT

## 2017-12-29 PROCEDURE — 99232 SBSQ HOSP IP/OBS MODERATE 35: CPT | Mod: ,,, | Performed by: NURSE PRACTITIONER

## 2017-12-29 PROCEDURE — 25000003 PHARM REV CODE 250: Performed by: TRANSPLANT SURGERY

## 2017-12-29 PROCEDURE — 80069 RENAL FUNCTION PANEL: CPT

## 2017-12-29 PROCEDURE — 85730 THROMBOPLASTIN TIME PARTIAL: CPT

## 2017-12-29 PROCEDURE — 63600175 PHARM REV CODE 636 W HCPCS: Performed by: NURSE PRACTITIONER

## 2017-12-29 PROCEDURE — 97803 MED NUTRITION INDIV SUBSEQ: CPT | Performed by: DIETITIAN, REGISTERED

## 2017-12-29 PROCEDURE — 82977 ASSAY OF GGT: CPT

## 2017-12-29 PROCEDURE — 85025 COMPLETE CBC W/AUTO DIFF WBC: CPT

## 2017-12-29 PROCEDURE — 63600175 PHARM REV CODE 636 W HCPCS: Performed by: INTERNAL MEDICINE

## 2017-12-29 PROCEDURE — 25000003 PHARM REV CODE 250: Performed by: NURSE PRACTITIONER

## 2017-12-29 PROCEDURE — 20600001 HC STEP DOWN PRIVATE ROOM

## 2017-12-29 PROCEDURE — 81001 URINALYSIS AUTO W/SCOPE: CPT

## 2017-12-29 RX ORDER — GLUCAGON 1 MG
1 KIT INJECTION
Status: DISCONTINUED | OUTPATIENT
Start: 2017-12-29 | End: 2017-12-31

## 2017-12-29 RX ORDER — INSULIN ASPART 100 [IU]/ML
0-5 INJECTION, SOLUTION INTRAVENOUS; SUBCUTANEOUS
Status: DISCONTINUED | OUTPATIENT
Start: 2017-12-29 | End: 2017-12-31

## 2017-12-29 RX ORDER — FUROSEMIDE 10 MG/ML
120 INJECTION INTRAMUSCULAR; INTRAVENOUS 2 TIMES DAILY
Status: DISCONTINUED | OUTPATIENT
Start: 2017-12-29 | End: 2017-12-29

## 2017-12-29 RX ORDER — IBUPROFEN 200 MG
24 TABLET ORAL
Status: DISCONTINUED | OUTPATIENT
Start: 2017-12-29 | End: 2017-12-31

## 2017-12-29 RX ORDER — HYDROMORPHONE HYDROCHLORIDE 2 MG/1
4 TABLET ORAL
Status: DISCONTINUED | OUTPATIENT
Start: 2017-12-29 | End: 2018-01-05 | Stop reason: HOSPADM

## 2017-12-29 RX ORDER — INSULIN ASPART 100 [IU]/ML
2 INJECTION, SOLUTION INTRAVENOUS; SUBCUTANEOUS
Status: DISCONTINUED | OUTPATIENT
Start: 2017-12-29 | End: 2017-12-30

## 2017-12-29 RX ORDER — HYDROMORPHONE HYDROCHLORIDE 2 MG/1
2 TABLET ORAL
Status: DISCONTINUED | OUTPATIENT
Start: 2017-12-29 | End: 2018-01-05 | Stop reason: HOSPADM

## 2017-12-29 RX ORDER — DOCUSATE SODIUM 100 MG/1
100 CAPSULE, LIQUID FILLED ORAL 3 TIMES DAILY
Status: DISCONTINUED | OUTPATIENT
Start: 2017-12-29 | End: 2017-12-31

## 2017-12-29 RX ORDER — VALGANCICLOVIR 450 MG/1
450 TABLET, FILM COATED ORAL
Status: DISCONTINUED | OUTPATIENT
Start: 2018-01-01 | End: 2018-01-02

## 2017-12-29 RX ORDER — FUROSEMIDE 10 MG/ML
80 INJECTION INTRAMUSCULAR; INTRAVENOUS ONCE
Status: COMPLETED | OUTPATIENT
Start: 2017-12-29 | End: 2017-12-29

## 2017-12-29 RX ORDER — QUETIAPINE FUMARATE 25 MG/1
25 TABLET, FILM COATED ORAL NIGHTLY
Status: DISCONTINUED | OUTPATIENT
Start: 2017-12-29 | End: 2018-01-03

## 2017-12-29 RX ORDER — SULFAMETHOXAZOLE AND TRIMETHOPRIM 400; 80 MG/1; MG/1
1 TABLET ORAL
Status: DISCONTINUED | OUTPATIENT
Start: 2018-01-01 | End: 2018-01-02

## 2017-12-29 RX ORDER — IBUPROFEN 200 MG
16 TABLET ORAL
Status: DISCONTINUED | OUTPATIENT
Start: 2017-12-29 | End: 2017-12-31

## 2017-12-29 RX ADMIN — NIFEDIPINE 30 MG: 30 TABLET, FILM COATED, EXTENDED RELEASE ORAL at 08:12

## 2017-12-29 RX ADMIN — FAMOTIDINE 20 MG: 20 TABLET, FILM COATED ORAL at 09:12

## 2017-12-29 RX ADMIN — SODIUM CHLORIDE 1.7 UNITS/HR: 9 INJECTION, SOLUTION INTRAVENOUS at 07:12

## 2017-12-29 RX ADMIN — MYCOPHENOLATE MOFETIL 1000 MG: 250 CAPSULE ORAL at 08:12

## 2017-12-29 RX ADMIN — QUETIAPINE FUMARATE 25 MG: 25 TABLET, FILM COATED ORAL at 09:12

## 2017-12-29 RX ADMIN — HEPARIN SODIUM 5000 UNITS: 5000 INJECTION, SOLUTION INTRAVENOUS; SUBCUTANEOUS at 06:12

## 2017-12-29 RX ADMIN — HEPARIN SODIUM 5000 UNITS: 5000 INJECTION, SOLUTION INTRAVENOUS; SUBCUTANEOUS at 09:12

## 2017-12-29 RX ADMIN — MYCOPHENOLATE MOFETIL 1000 MG: 250 CAPSULE ORAL at 11:12

## 2017-12-29 RX ADMIN — FUROSEMIDE 80 MG: 10 INJECTION, SOLUTION INTRAMUSCULAR; INTRAVENOUS at 09:12

## 2017-12-29 RX ADMIN — MAGNESIUM SULFATE IN WATER 2 G: 40 INJECTION, SOLUTION INTRAVENOUS at 06:12

## 2017-12-29 RX ADMIN — PANTOPRAZOLE SODIUM 40 MG: 40 TABLET, DELAYED RELEASE ORAL at 08:12

## 2017-12-29 RX ADMIN — METHYLPREDNISOLONE SODIUM SUCCINATE 60 MG: 125 INJECTION, POWDER, FOR SOLUTION INTRAMUSCULAR; INTRAVENOUS at 09:12

## 2017-12-29 RX ADMIN — HYDROMORPHONE HYDROCHLORIDE 2 MG: 2 TABLET ORAL at 03:12

## 2017-12-29 RX ADMIN — SULFAMETHOXAZOLE AND TRIMETHOPRIM 1 TABLET: 400; 80 TABLET ORAL at 08:12

## 2017-12-29 RX ADMIN — SODIUM BICARBONATE 650 MG TABLET 1300 MG: at 08:12

## 2017-12-29 RX ADMIN — NYSTATIN 500000 UNITS: 500000 SUSPENSION ORAL at 12:12

## 2017-12-29 RX ADMIN — HEPARIN SODIUM 5000 UNITS: 5000 INJECTION, SOLUTION INTRAVENOUS; SUBCUTANEOUS at 02:12

## 2017-12-29 RX ADMIN — MUPIROCIN 1 G: 20 OINTMENT TOPICAL at 09:12

## 2017-12-29 RX ADMIN — TACROLIMUS 1 MG: 1 CAPSULE ORAL at 08:12

## 2017-12-29 RX ADMIN — SODIUM BICARBONATE 650 MG TABLET 1300 MG: at 09:12

## 2017-12-29 RX ADMIN — DOCUSATE SODIUM 100 MG: 100 CAPSULE, LIQUID FILLED ORAL at 09:12

## 2017-12-29 RX ADMIN — TACROLIMUS 1 MG: 1 CAPSULE ORAL at 06:12

## 2017-12-29 RX ADMIN — VALGANCICLOVIR 450 MG: 450 TABLET, FILM COATED ORAL at 08:12

## 2017-12-29 RX ADMIN — NYSTATIN 500000 UNITS: 500000 SUSPENSION ORAL at 06:12

## 2017-12-29 RX ADMIN — MUPIROCIN 1 G: 20 OINTMENT TOPICAL at 08:12

## 2017-12-29 RX ADMIN — METHYLPREDNISOLONE SODIUM SUCCINATE 60 MG: 125 INJECTION, POWDER, FOR SOLUTION INTRAMUSCULAR; INTRAVENOUS at 08:12

## 2017-12-29 RX ADMIN — POSACONAZOLE 300 MG: 100 TABLET, COATED ORAL at 08:12

## 2017-12-29 NOTE — PLAN OF CARE
Problem: Physical Therapy Goal  Goal: Physical Therapy Goal  Goals to be met by: 18    Patient will increase functional independence with mobility by performin. Supine to sit with Contact Guard Assistance- not met  2. Sit to stand transfer with Contact Guard Assistance - met       Revised: Sit to stand transfer with Supervision - Not met  3. Gait  x 150 feet with Contact Guard Assistance using AD if needed.- met       Revised: Gait  x 250 feet with Supervision using AD if needed.- Not met  4. Lower extremity exercise program x15 reps , with supervision -not met     Outcome: Ongoing (interventions implemented as appropriate)  Goals #2 and #3 met and revised

## 2017-12-29 NOTE — PT/OT/SLP PROGRESS
Physical Therapy Treatment    Patient Name:  Marcie Bazan   MRN:  [id number]    Recommendations:     Discharge Recommendations:  home health PT   Discharge Equipment Recommendations:  (TBD)   Barriers to discharge: None    Assessment:     Marcie Bazan is a 52 y.o. female admitted with a medical diagnosis of S/P liver transplant.  She presents with the following impairments/functional limitations:  weakness, impaired endurance, impaired functional mobilty, gait instability, impaired balance Pt. cooperative and tolerated treatment well. Pt. progressing with mobility.    Rehab Prognosis:  good; patient would benefit from acute skilled PT services to address these deficits and reach maximum level of function.      Recent Surgery: Procedure(s) (LRB):  TRANSPLANT-LIVER (N/A) 3 Days Post-Op    Plan:     During this hospitalization, patient to be seen 5 x/week to address the above listed problems via gait training, therapeutic activities, therapeutic exercises  · Plan of Care Expires:  01/25/18   Plan of Care Reviewed with: patient    Subjective     Communicated with nursing prior to session.  Patient found seated in bedside chair upon PT entry to room, agreeable to treatment.      Chief Complaint: abd. discomfort  Patient comments/goals: none stated  Pain/Comfort:  · Pain Rating 1:  (pt. did not rate)  · Location - Orientation 1: generalized  · Location 1: abdomen  · Pain Addressed 1: Pre-medicate for activity, Reposition, Distraction, Cessation of Activity    Patients cultural, spiritual, Druze conflicts given the current situation: no    Objective:     Patient found with: central line     General Precautions: Standard, fall   Orthopedic Precautions:N/A   Braces:       Functional Mobility:  · Transfers:     · Sit to Stand:  contact guard assistance with rolling walker  · Gait: 250' with rollator and CGA for safety due to deviations from path.  · Balance: fair      AM-PAC 6 CLICK MOBILITY  Turning over in bed  (including adjusting bedclothes, sheets and blankets)?: 2  Sitting down on and standing up from a chair with arms (e.g., wheelchair, bedside commode, etc.): 3  Moving from lying on back to sitting on the side of the bed?: 2  Moving to and from a bed to a chair (including a wheelchair)?: 3  Need to walk in hospital room?: 3  Climbing 3-5 steps with a railing?: 2  Total Score: 15       Therapeutic Activities and Exercises:   Discussed pt.'s progress and PT POC    Patient left up in chair with all lines intact and call button in reach..    GOALS:    Physical Therapy Goals        Problem: Physical Therapy Goal    Goal Priority Disciplines Outcome Goal Variances Interventions   Physical Therapy Goal     PT/OT, PT Ongoing (interventions implemented as appropriate)     Description:  Goals to be met by: 18    Patient will increase functional independence with mobility by performin. Supine to sit with Contact Guard Assistance- not met  2. Sit to stand transfer with Contact Guard Assistance - met       Revised: Sit to stand transfer with Supervision - Not met  3. Gait  x 150 feet with Contact Guard Assistance using AD if needed.- met       Revised: Gait  x 250 feet with Supervision using AD if needed.- Not met  4. Lower extremity exercise program x15 reps , with supervision -not met                 Multidisciplinary Problems (Resolved)        Problem: Physical Therapy Goal    Goal Priority Disciplines Outcome Goal Variances Interventions   Physical Therapy Goal   (Resolved)     PT/OT, PT Outcome(s) achieved     Description:  Goals to be met by: 17    Patient will increase functional independence with mobility by performin. Supine to sit with Set-up [place] MET  2. Sit to supine with Set-up [place] MET  3. Sit to stand transfer with Supervision MET  4. Bed to chair transfer with Supervision using Rolling Walker MET  5. Gait  x 100 feet with Stand-by Assistance using Rolling Walker.  MET  Revised: Gait x 300 ft with rollator usage and (S)   6. Lower extremity exercise program x20 reps per handout, with independence                          Time Tracking:     PT Received On: 12/29/17  PT Start Time: 1517     PT Stop Time: 1532  PT Total Time (min): 15 min     Billable Minutes: Gait Training 15    Treatment Type: Treatment  PT/PTA: PT     PTA Visit Number: 0     Romeo Garces, PT  12/29/2017

## 2017-12-29 NOTE — PROGRESS NOTES
[facility]  Adult Nutrition  Progress Note    SUMMARY     Recommendations    Recommendation/Intervention: Continue renal diet if renal function remains insufficient. Liberalize to low sodium as medically able.     Post transplant nutrition education provided to pt and son. Will follow up for questions next week. RD following.     Goals: Meet % EEN, EPN  Nutrition Goal Status: progressing towards goal  Communication of RD Recs: reviewed with RN    Reason for Assessment    Reason for Assessment: RD follow-up  Diagnosis: transplant/postoperative complications (OLTx 12/26)  Relevent Medical History: cirrhosis 2' EtOH, ESRD   Interdisciplinary Rounds: did not attend     General Information Comments: POD# 3 s/p OLTx, pt remains in SICU awaiting TSU bed, diet advanced today from clears to renal, gonzalo'ing, ate 75% of lunch, disoriented, son at bedside    Nutrition Discharge Planning: Post transplant nutrition education provided. Food safety/drug interactions emphasized. General healthy diet recommended. RD name/contact information, education material left. No other needs identified. Caregiver present.    Nutrition Prescription Ordered    Current Diet Order: Renal  Diet order comments: 75% of lunch 12/29 - first solid meal since tx     Evaluation of Received Nutrients/Fluid Intake    IV Fluid (mL): 4021     I/O: +10L since admit       Fluid Required: meeting needs  Comments: LBM 12/25  Tolerance: tolerating  % Intake of Estimated Energy Needs: 75 - 100 %  % Meal Intake: 75%     Nutrition Risk Screen     Nutrition Risk Screen: dysphagia or difficulty swallowing    Nutrition/Diet History    Patient Reported Diet/Restrictions/Preferences: low salt  Typical Food/Fluid Intake: decreased appetite, but never was a big eater  Food Preferences: No cultural or Bahai preferences noted     Factors Affecting Nutritional Intake:  (POD#3)      Labs/Tests/Procedures/Meds    Pertinent Labs Reviewed:  "reviewed  Pertinent Labs Comments: P 2.5, TBil 2.2, AST 43, ALT 84  Pertinent Medications Reviewed: reviewed  Pertinent Medications Comments: docusate, prednisone, pantoprazole, tacrolimus    Physical Findings    Overall Physical Appearance: nourished, overweight  Tubes:  (-)  Oral/Mouth Cavity: WDL  Skin: other (see comments) (Incision/drain - abdomen)    Anthropometrics    Temp: 97.5 °F (36.4 °C)     Height: 5' 5" (165.1 cm)  Weight Method: Bed Scale  Weight: 70.9 kg (156 lb 4.9 oz)     Ideal Body Weight (IBW), Female: 125 lb     % Ideal Body Weight, Female (lb): 106.7 lb  BMI (Calculated): 22.2  BMI Grade: 18.5-24.9 - normal     Usual Body Weight (UBW), k kg  Weight Change Amount: 22 lb 0.7 oz        Estimated/Assessed Needs    Weight Used For Calorie Calculations: 70.9 kg (156 lb 4.9 oz)   Height (cm): 165.1 cm  Energy Calorie Requirements (kcal): 1636  Energy Need Method:  (x1.25 PAL)        RMR ([place]-St. Jeor Equation): 1309.88      Weight Used For Protein Calculations: 60.5 kg (133 lb 6.1 oz)  Protein Requirements: 73-91 g/d (1.2-1.5 g/kg)    Fluid Requirements (mL): per MD  Fluid Need Method: RDA Method      RDA Method (mL): 1636           Assessment and Plan    * Liver transplant 2017 for ETOH    Nutrition Diagnosis  Increased nutrient needs    Related to (etiology):   Protein for healing and maintenance    Signs and Symptoms (as evidenced by):   S/p OLTx     Interventions/Recommendations (treatment strategy):  See recs    Nutrition Diagnosis Status:   New              Monitor and Evaluation    Food and Nutrient Intake: energy intake, food and beverage intake, enteral nutrition intake  Food and Nutrient Adminstration: diet order, enteral and parenteral nutrition administration     Physical Activity and Function: nutrition-related ADLs and IADLs  Anthropometric Measurements: weight, weight change  Biochemical Data, Medical Tests and Procedures: lipid profile, inflammatory profile, " glucose/endocrine profile, gastrointestinal profile, electrolyte and renal panel  Nutrition-Focused Physical Findings: overall appearance    Nutrition Risk    Level of Risk:  (1x/week)    Nutrition Follow-Up    RD Follow-up?: Yes

## 2017-12-29 NOTE — SUBJECTIVE & OBJECTIVE
Scheduled Meds:   docusate sodium  100 mg Oral TID    famotidine  20 mg Oral QHS    furosemide  120 mg Intravenous BID    heparin (porcine)  5,000 Units Subcutaneous Q8H    methylPREDNISolone sodium succinate  60 mg Intravenous Q12H    Followed by    [START ON 12/30/2017] methylPREDNISolone sodium succinate  40 mg Intravenous Q12H    Followed by    [START ON 12/31/2017] methylPREDNISolone sodium succinate  20 mg Intravenous Q12H    Followed by    [START ON 1/1/2018] predniSONE  20 mg Oral Daily    mupirocin  1 g Nasal BID    mycophenolate  1,000 mg Oral BID    NIFEdipine  30 mg Oral Daily    pantoprazole  40 mg Oral Daily    posaconazole  300 mg Oral Daily    QUEtiapine  25 mg Oral QHS    sodium bicarbonate  1,300 mg Oral BID    [START ON 1/1/2018] sulfamethoxazole-trimethoprim 400-80mg  1 tablet Oral Every Mon, Wed, Fri    tacrolimus  1 mg Oral BID    [START ON 1/1/2018] valGANciclovir  450 mg Oral Every Mon, Wed, Fri     Continuous Infusions:   insulin (HUMAN R) infusion (adults) Stopped (12/29/17 1300)     PRN Meds:sodium chloride 0.9%, calcium gluconate IVPB, calcium gluconate IVPB, calcium gluconate IVPB, dextrose 50%, dextrose 50%, glucagon (human recombinant), glucose, glucose, hydrALAZINE, HYDROmorphone, HYDROmorphone, insulin aspart, labetalol, magnesium sulfate IVPB, magnesium sulfate IVPB, magnesium sulfate IVPB, ondansetron, potassium chloride **AND** potassium chloride **AND** potassium chloride, sodium chloride 0.9%    Review of Systems   Respiratory: Negative for shortness of breath and wheezing.    Gastrointestinal: Positive for abdominal distention and abdominal pain. Negative for nausea and vomiting.   Neurological: Negative for dizziness and light-headedness.   Psychiatric/Behavioral: Positive for confusion and sleep disturbance. Negative for hallucinations. The patient is nervous/anxious.      Objective:     Vital Signs (Most Recent):  Temp: 97.5 °F (36.4 °C) (12/29/17  1100)  Pulse: 83 (12/29/17 1300)  Resp: 17 (12/29/17 1300)  BP: 126/73 (12/29/17 1300)  SpO2: 100 % (12/29/17 1300) Vital Signs (24h Range):  Temp:  [97.1 °F (36.2 °C)-98.4 °F (36.9 °C)] 97.5 °F (36.4 °C)  Pulse:  [74-94] 83  Resp:  [12-46] 17  SpO2:  [98 %-100 %] 100 %  BP: (104-164)/(61-96) 126/73  Arterial Line BP: (113-171)/(54-93) 113/56     Weight: 70.9 kg (156 lb 4.9 oz)  Body mass index is 26.01 kg/m².    Intake/Output - Last 3 Shifts       12/27 0700 - 12/28 0659 12/28 0700 - 12/29 0659 12/29 0700 - 12/30 0659    P.O. 200 240 720    I.V. (mL/kg) 2756 (45.6) 1988 (28) 2033 (28.7)    Blood 619      NG/GT       Total Intake(mL/kg) 3575 (59.1) 2228 (31.4) 2753 (38.8)    Urine (mL/kg/hr) 225 (0.2) 76 (0) 30 (0.1)    Drains 430 (0.3) 95 (0.1) 5 (0)    Other  2447 (1.4) 484 (1)    Total Output 655 2618 519    Net +2920 -390 +2234                 Physical Exam   Constitutional: No distress.   HENT:   Head: Normocephalic and atraumatic.   Mouth/Throat: Oropharynx is clear and moist.   Eyes: Conjunctivae and EOM are normal. Pupils are equal, round, and reactive to light.   Cardiovascular: Normal rate, regular rhythm and normal heart sounds.  Exam reveals no gallop and no friction rub.    No murmur heard.  Pulmonary/Chest: Effort normal and breath sounds normal.   Abdominal: Soft. She exhibits distension.   Appropriate tenderness to palpation, incision c/d/i   Musculoskeletal: She exhibits no edema.   Neurological: She is alert. No cranial nerve deficit.   Oriented to person, place and situation, intermittently oriented to time   Skin: Skin is warm and dry. Capillary refill takes less than 2 seconds.   Psychiatric: She has a normal mood and affect. Her behavior is normal.       Laboratory:  Immunosuppressants         Stop Route Frequency     mycophenolate capsule 1,000 mg      -- Oral 2 times daily     tacrolimus capsule 1 mg      -- Oral 2 times daily        CBC:   Recent Labs  Lab 12/29/17  0330   WBC 10.00   RBC  2.79*   HGB 8.6*   HCT 23.8*   PLT 80*   MCV 85   MCH 30.8   MCHC 36.1*     CMP:   Recent Labs  Lab 12/29/17  0330     109   CALCIUM 7.6*  7.6*   ALBUMIN 3.0*  3.0*   PROT 5.2*     138   K 4.2  4.2   CO2 25  25     105   BUN 15  15   CREATININE 1.1  1.1   ALKPHOS 93   ALT 84*   AST 43*   BILITOT 3.1*     Coagulation:   Recent Labs  Lab 12/29/17  0330   INR 1.3*   APTT 31.1       Diagnostic Results:  US Liver Transplant Post:  Liver allograft is normal in size with the right hepatic lobe spanning approximately 14.3 cm.  The liver demonstrates homogeneous echotexture.  No focal hepatic abnormalities.  There are 2 perihepatic fluid collections identified.  A complex fluid collection along the left hepatic lobe measures approximately 4.6 x 2.3 x 3.8 cm while a collection along the inferior aspect of the right hepatic lobe in Catherine's pouch measures approximately 3.8 x 0.7 x 3.9 cm.    The common duct is nondilated, measuring 2.0 mm.  No dilated intrahepatic radicles are seen.    Color flow and spectral waveform analysis was performed.  The main portal vein, right portal vein, left portal vein, middle hepatic vein, right hepatic vein, left hepatic vein, SMV, and IVC are patent with proper directional flow.      Anastomosis site of the main hepatic artery demonstrates a peak systolic velocity 102 cm/sec.  Main hepatic artery demonstrates resistive index of 0.83 with normal waveform.  Left hepatic artery shows resistive index 1.0 with normal waveform.  Anterior branch of the right hepatic artery demonstrates resistive index 1.0 with normal waveform.  Posterior branch of the right hepatic artery demonstrates resistive index 0.80 with normal waveform.    There is a small right pleural effusion.    US kidneys:  Both kidneys are normal in size, 10.6 cm on the right and 11.1 cm on the left.  Cortical thickness is differentiation are preserved.  There is mild increased renal cortical echogenicity  bilaterally.  There are no focal renal abnormalities or hydronephrosis.  The bladder is not distended and contains a Rivera catheter.  There is a trace amount of perinephric fluid on the right.  There is globally poor perfusion in both kidneys.  The resistive indices in the segmental arteries are elevated, 0.8 on the right and 0.83 on the left, a nonspecific sign and can be seen with chronic renal disease.  There is scattered ascites.  Incidental note is made of a left pleural effusion.

## 2017-12-29 NOTE — ASSESSMENT & PLAN NOTE
Nutrition Diagnosis  Increased nutrient needs    Related to (etiology):   Protein for healing and maintenance    Signs and Symptoms (as evidenced by):   S/p OLTx     Interventions/Recommendations (treatment strategy):  See recs    Nutrition Diagnosis Status:   New

## 2017-12-29 NOTE — PLAN OF CARE
Problem: Patient Care Overview  Goal: Plan of Care Review  Outcome: Ongoing (interventions implemented as appropriate)  Plan of care reviewed with patient and patient's son. Remained on room air throughout the day with 02 sats 100%.  Awake and alert, remains disoriented to time and situation.  OOB to chair this morning, tolerated well.  80 mg lasix given for continued minimal UOP, no response noted.  Plan is for CRRT tonight.  PRN meds administered to maintain SBP<160.  Lateral drain pulled this morning by MD.  VSS throughout the day, see flowsheet for full assessment.

## 2017-12-29 NOTE — PLAN OF CARE
Pt transferred from SICU via G[facility]O Hein. She is disoriented to person only and has very delayed responses. Son is at bedside. He was taught how to paint her incision with betadine. She ambulates with a walker and one assist; ambulated in halls with PT.  She has a stevens cath, draining cale urine. Fall precautions in place. Bed alarm on, bed in low position, call bell in reach, non-skid socks on when pt not in bed.

## 2017-12-29 NOTE — ASSESSMENT & PLAN NOTE
Marcie Bazan is a 52 year old female with pass history of liver cirrhosis secondary to ETOH, who recently had liver transplanat on 12/26/17, which afterward presented with DEREK that mostly secondary to ischemic ATN after surgery, serum creatinine has been worsening and became anuric.     Plan:   - Serum creatinine baseline 0.7-0.9  - Currently with sCr 1.1 after SLED yesterday   -Today CVP= 5   - Will Hold on SLED today and reassess tomorrow if require dialysis   - Urine sediment presented with few RBCs (no dysmorphic cells), no casts   - Renal ultrasound with decrease perfusion and increase renal cortical echogenicity suggesting chronic renal disease.  No evidence of hydronephrosis, masses or nephrolysiasis   -Avoid nephrotoxic medications and renal dose medications  - Continue with intake and output

## 2017-12-29 NOTE — PROGRESS NOTES
Update:  SW met with pt and pt's son Duarte in pt's room in order to provide continuity of care. Pt alert and oriented but sometimes appears confused. Pt aware that her daughter Segundo had a new baby and able state the baby's name correctly. Pt's son pleasant, engaged, reports that pt doing well overall. Son aware that pt's kidneys improving with CRRT from last evening. Pt sitting up in a chair and watching TV. Son privately notified SW that pt's sister Yani lost her daughter a few days ago. Per son, Yani's daughter had special needs and may have choked. Son asked that team keep this information from the pt as family will notify pt once she is medically stable and discharged. SW confirmed that this would be no problem. SW provided emotional support to son, stating sympathy. SW asked that family reach out if they needed any additional emotional support at this time. Son reports that he will have family contact SW if they need assistance. Pt and son had no further questions or concerns at this time. SW remains available for continued psychosocial support, education, resources, and additional d/c planning as needed.

## 2017-12-29 NOTE — PROGRESS NOTES
[facility]  Liver Transplant  Progress Note    Patient Name: Marcie Bazan  MRN: [id number]  Admission Date: 2017  Hospital Length of Stay: 36 days  Code Status: Full Code  Primary Care Provider: ODILIA Wall  Post-Op Day 3 Days Post-Op    Admit Diagnosis: Hepatic encephalopathy, Hyponatremia  Procedures: Liver transplant    ORGAN:   LIVER  Disease Etiology: Alcoholic Cirrhosis  Donor Type:    - Brain Death  CDC High Risk:   No  Donor CMV Status:   Donor CMV Status: Negative  Donor HBcAB:   Negative  Donor HCV Status:   Negative  Whole or Partial: Whole Liver  Biliary Anastomosis: End to End  Arterial Anatomy: Standard    HPI:  52 year old female with PMHx of 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 Patient presented to [facility] with generalized weakness. Onset two months ago, progressively worsening. Was admitted to  11/10 for hyponatremia seen on labs in GI office. Discharged on 11/15 and returned on  with 1 week worsening weakness, malaise, lethargy, and decreased appetite. Denied fever/chills, abdominal pain, bright red or black tarry stools, hematemesis or other abnormal bleeding, confusion or disorientation, changes in urination, light-headedness, headache, changes in vision, dyspnea, chest pain or palpitations. Labs demonstrated Na 116 and MELD 32; patient admitted for hyponatremia. Nephrology consulted, recommended fluid restriction , holding diuretics, and prednisode taper that started  (decreased from 40mg QD to 20mg Qd.) Patient transferred to [facility] for revaluation of liver disease. Of note, paracentesis performed , removed 2L, negative for SBP.     Follows with Dr. Souza for cirrhosis. Since early 2017, patient's liver failure progressing indicated by worsening clinical signs  (abdominal distention, weakness/fatigue) and MELD. In mid-October, patient's PETH demonstrated EtOH use and she was denied candidacy for liver transplant. She was then enrolled in in-patient substance abuse program at [facility] where she has remained since early November; per patient, her last EtOH beverage was 11/01/2017. Denies history of GI bleed, SBP, renal disease, or other complications of cirrhosis. Liver biopsy 10/24 demonstrated stage 4 cirrhosis. Records reports EGD in August 2016 that showed small esophageal varices with severe gastritis. Colonoscopy 2015 with 2 polyps and internal hemorrhoids Started on prednisone 40mg QD on 10/25; was on spironolactone, has been held since 11/10 for hypoNa; on lactulose.    Hospital course:  Mental status improving with hepatic encephalopathy treatment. Started empiric ceftriaxone given leukocytosis in immunocompromised state. CMV PCR positive, for which ID was consulted, recommended repeating titers in 1 week. Seen by Psychiatry and  who determined she was moderate risk for Liver transplant. EGD/Cscope and mammogram ordered. Colonoscopy not performed due to poor prep, EGD revealed large varices which were banded. IR paracentesis ordered after patient started complaining of abdominal pain. Mammogram performed, IR paracentesis after INR Reversal negative for SBP.  As pt with low risk malignancy, it was decided not to proceed with a breast biopsy.  Patient accepted for Liver transplant. Listed with MELD 35. Patient was slightly confused and altered, which resolved with lactulose enemas.     Liver transplant 12/27/2017 (DBD, EBV D+/R+, CMV D-/R+, donor normal anatomy)     POD# 0 - Slow to arouse from sedation, not following commands, minimal vent setting however unable to extubate due to mental status. Poor UOP.     POD#1 - Extubated, pt more alert and able to follow commands however confused and disoriented. Poor UOP 10-15 cc/hr. LFTs trending down ,  normalized INR, Liver US within normal limits. Thrombocytopenia, 1 pack plts given with line removal, transducer in RIJ  Developed hematoma, required holding pressure for 1.5hrs and another pack of plts.     POD#2 - Improve mental status AAO to person and place, not situation or time. Continues to have minimal UOP not responsive to lasix, Nephrology consulted, started CRRT. LFTs continue to trend down, INR normalized, plts 136     TODAY POD# 3 - UOP remaines 5cc/hr, tolerated CRRT for 10 hours, 2.5L UF. Mental status continues to clear up. AF, HDS, tolerating diet, OOB, pain controlled.    Subjective:     Scheduled Meds:   docusate sodium  100 mg Oral TID    famotidine  20 mg Oral QHS    furosemide  120 mg Intravenous BID    heparin (porcine)  5,000 Units Subcutaneous Q8H    methylPREDNISolone sodium succinate  60 mg Intravenous Q12H    Followed by    [START ON 12/30/2017] methylPREDNISolone sodium succinate  40 mg Intravenous Q12H    Followed by    [START ON 12/31/2017] methylPREDNISolone sodium succinate  20 mg Intravenous Q12H    Followed by    [START ON 1/1/2018] predniSONE  20 mg Oral Daily    mupirocin  1 g Nasal BID    mycophenolate  1,000 mg Oral BID    NIFEdipine  30 mg Oral Daily    pantoprazole  40 mg Oral Daily    posaconazole  300 mg Oral Daily    QUEtiapine  25 mg Oral QHS    sodium bicarbonate  1,300 mg Oral BID    [START ON 1/1/2018] sulfamethoxazole-trimethoprim 400-80mg  1 tablet Oral Every Mon, Wed, Fri    tacrolimus  1 mg Oral BID    [START ON 1/1/2018] valGANciclovir  450 mg Oral Every Mon, Wed, Fri     Continuous Infusions:   insulin (HUMAN R) infusion (adults) Stopped (12/29/17 1300)     PRN Meds:sodium chloride 0.9%, calcium gluconate IVPB, calcium gluconate IVPB, calcium gluconate IVPB, dextrose 50%, dextrose 50%, glucagon (human recombinant), glucose, glucose, hydrALAZINE, HYDROmorphone, HYDROmorphone, insulin aspart, labetalol, magnesium sulfate IVPB, magnesium sulfate  IVPB, magnesium sulfate IVPB, ondansetron, potassium chloride **AND** potassium chloride **AND** potassium chloride, sodium chloride 0.9%    Review of Systems   Respiratory: Negative for shortness of breath and wheezing.    Gastrointestinal: Positive for abdominal distention and abdominal pain. Negative for nausea and vomiting.   Neurological: Negative for dizziness and light-headedness.   Psychiatric/Behavioral: Positive for confusion and sleep disturbance. Negative for hallucinations. The patient is nervous/anxious.      Objective:     Vital Signs (Most Recent):  Temp: 97.5 °F (36.4 °C) (12/29/17 1100)  Pulse: 83 (12/29/17 1300)  Resp: 17 (12/29/17 1300)  BP: 126/73 (12/29/17 1300)  SpO2: 100 % (12/29/17 1300) Vital Signs (24h Range):  Temp:  [97.1 °F (36.2 °C)-98.4 °F (36.9 °C)] 97.5 °F (36.4 °C)  Pulse:  [74-94] 83  Resp:  [12-46] 17  SpO2:  [98 %-100 %] 100 %  BP: (104-164)/(61-96) 126/73  Arterial Line BP: (113-171)/(54-93) 113/56     Weight: 70.9 kg (156 lb 4.9 oz)  Body mass index is 26.01 kg/m².    Intake/Output - Last 3 Shifts       12/27 0700 - 12/28 0659 12/28 0700 - 12/29 0659 12/29 0700 - 12/30 0659    P.O. 200 240 720    I.V. (mL/kg) 2756 (45.6) 1988 (28) 2033 (28.7)    Blood 619      NG/GT       Total Intake(mL/kg) 3575 (59.1) 2228 (31.4) 2753 (38.8)    Urine (mL/kg/hr) 225 (0.2) 76 (0) 30 (0.1)    Drains 430 (0.3) 95 (0.1) 5 (0)    Other  2447 (1.4) 484 (1)    Total Output 655 2618 519    Net +2920 -390 +2234                 Physical Exam   Constitutional: No distress.   HENT:   Head: Normocephalic and atraumatic.   Mouth/Throat: Oropharynx is clear and moist.   Eyes: Conjunctivae and EOM are normal. Pupils are equal, round, and reactive to light.   Cardiovascular: Normal rate, regular rhythm and normal heart sounds.  Exam reveals no gallop and no friction rub.    No murmur heard.  Pulmonary/Chest: Effort normal and breath sounds normal.   Abdominal: Soft. She exhibits distension.   Appropriate  tenderness to palpation, incision c/d/i   Musculoskeletal: She exhibits no edema.   Neurological: She is alert. No cranial nerve deficit.   Oriented to person, place and situation, intermittently oriented to time   Skin: Skin is warm and dry. Capillary refill takes less than 2 seconds.   Psychiatric: She has a normal mood and affect. Her behavior is normal.       Laboratory:  Immunosuppressants         Stop Route Frequency     mycophenolate capsule 1,000 mg      -- Oral 2 times daily     tacrolimus capsule 1 mg      -- Oral 2 times daily        CBC:   Recent Labs  Lab 12/29/17 0330   WBC 10.00   RBC 2.79*   HGB 8.6*   HCT 23.8*   PLT 80*   MCV 85   MCH 30.8   MCHC 36.1*     CMP:   Recent Labs  Lab 12/29/17 0330     109   CALCIUM 7.6*  7.6*   ALBUMIN 3.0*  3.0*   PROT 5.2*     138   K 4.2  4.2   CO2 25  25     105   BUN 15  15   CREATININE 1.1  1.1   ALKPHOS 93   ALT 84*   AST 43*   BILITOT 3.1*     Coagulation:   Recent Labs  Lab 12/29/17 0330   INR 1.3*   APTT 31.1       Diagnostic Results:  US Liver Transplant Post:  Liver allograft is normal in size with the right hepatic lobe spanning approximately 14.3 cm.  The liver demonstrates homogeneous echotexture.  No focal hepatic abnormalities.  There are 2 perihepatic fluid collections identified.  A complex fluid collection along the left hepatic lobe measures approximately 4.6 x 2.3 x 3.8 cm while a collection along the inferior aspect of the right hepatic lobe in Catherine's pouch measures approximately 3.8 x 0.7 x 3.9 cm.    The common duct is nondilated, measuring 2.0 mm.  No dilated intrahepatic radicles are seen.    Color flow and spectral waveform analysis was performed.  The main portal vein, right portal vein, left portal vein, middle hepatic vein, right hepatic vein, left hepatic vein, SMV, and IVC are patent with proper directional flow.      Anastomosis site of the main hepatic artery demonstrates a peak systolic velocity 102  cm/sec.  Main hepatic artery demonstrates resistive index of 0.83 with normal waveform.  Left hepatic artery shows resistive index 1.0 with normal waveform.  Anterior branch of the right hepatic artery demonstrates resistive index 1.0 with normal waveform.  Posterior branch of the right hepatic artery demonstrates resistive index 0.80 with normal waveform.    There is a small right pleural effusion.    US kidneys:  Both kidneys are normal in size, 10.6 cm on the right and 11.1 cm on the left.  Cortical thickness is differentiation are preserved.  There is mild increased renal cortical echogenicity bilaterally.  There are no focal renal abnormalities or hydronephrosis.  The bladder is not distended and contains a Rivera catheter.  There is a trace amount of perinephric fluid on the right.  There is globally poor perfusion in both kidneys.  The resistive indices in the segmental arteries are elevated, 0.8 on the right and 0.83 on the left, a nonspecific sign and can be seen with chronic renal disease.  There is scattered ascites.  Incidental note is made of a left pleural effusion.      Assessment/Plan:   Marcie Bazan is a 52 y.o. female s/p OLT 12/26    Renal/   Hypomagnesemia    - Stable. Monitor.           ID   Cytomegalovirus (CMV) viremia              Immunology/Multi System   Immunosuppressed status    Previously on chronic prednisone for alcoholic hepatitis; Prednisone has been weaned off, last dose of 5mg of prednisone was on 12/15          Oncology   Anemia of chronic disease               Endocrine   Severe protein-calorie malnutrition              GI   * Liver transplant 12/26/2017 for ETOH    Neuro:   -H/o HE  -AMS, confusion and not oriented to time, improved lethargy from yesterday, will continue to monitor closely  -Encourage OOB and good sleep practise  - Seroquel 50 nightly, will reduce dosage due to interaction with Posaconazole  - Continue current pain control regimen, PO and IV for  breakthrough     Resp:  - Extubated (12/27, POD# 1)  - Minimize supplemental O2 requirements - currently on RA  - Encourage IS, deep breathing, cough     CV:  - HDS  -Hypertension resolved with CRRT - on nifedipine 30mg q daily, also Hydralazine and Labetalol PRN  -Will continue to monitor     Heme: Anemia  - Hgb/Hct 8.6/23.8, unchanged overnight  -Thrombocytopenia -  POD# 1 received 2 packs plts d/t thrombocytopenia during line removal was given one, then prolonged oozing and hematoma afterward, thus another pack transfused, hematoma improved, no further bleeding.  - Continue to trend     ID:  - Afebrile, no leukocytosis  -Will monitor for infectious sources  - Continue opportunistic infection prophylaxis     Immuno: Immunocompromised state  - Continue current immunosuppression regimen - Solumedrol taper, Tacrolimus, Cellcept  - Daily Prograf level - will adjust dosage accordingly  - Continue opportunistic infection prophylaxis     Renal:  -Minimal UOP since POD#1 @ 5cc/hr with bump in Cr to 2.1 from baseline 1.3, Unresponsive to Lasix started on CRRT yesterday, tolerated well 2.5L UF, will discuss with nephrology recommendations   -Metabolic acidosis resolved  - Rivera in place  - Strict I/Os     FEN/GI: ESLD s/p OLT 12/26, transaminitis, protein calorie malnutrition  - Liver U/S: see above  - Trend LFTs  - Replace lytes PRN  -Advance diet today     Endo: Hyperglycemia  - Endocrine following, appreciate assistance  - Accuchecks  - Insulin gtt --> will switch to SSI per endocrinology protocols once tolerating diet     Dispo:  -Ready for step down to TSU     Lupe Solorio MD  [employer] General Surgery, PGY2  Transplant           Ascites    - Last paracentesis on 12/19 with 4.6L off.   - Fluid neg for infection.               The patients clinical status was discussed at multidisplinary rounds, involving transplant surgery, transplant medicine, pharmacy, nursing, nutrition, and social work    Lupe Solorio MD  Liver  Transplant  [facility]

## 2017-12-29 NOTE — SUBJECTIVE & OBJECTIVE
Interval History:   Patent feeling better today, had SLED done yesterday without any complaints, total intake yesterday 4.2 L and output 61 cc of urine. NET positive 1.2 L.     Review of patient's allergies indicates:   Allergen Reactions    Ferrous sulfate Other (See Comments)     Patient states the pill makes her sick. She stated she would rather have a shot     Current Facility-Administered Medications   Medication Frequency    0.9%  NaCl infusion (CRRT USE ONLY) PRN    0.9%  NaCl infusion (for blood administration) Q24H PRN    0.9%  NaCl infusion (for blood administration) Q24H PRN    calcium gluconate 1 g in dextrose 5 % 100 mL IVPB (premix) PRN    calcium gluconate 1 g in dextrose 5 % 100 mL IVPB (premix) PRN    calcium gluconate 1 g in dextrose 5 % 100 mL IVPB (premix) PRN    dextrose 50% injection 12.5 g PRN    dextrose 50% injection 25 g PRN    famotidine tablet 20 mg QHS    furosemide injection 120 mg BID    glucagon (human recombinant) injection 1 mg PRN    glucose chewable tablet 16 g PRN    glucose chewable tablet 24 g PRN    heparin (porcine) injection 5,000 Units Q8H    hydrALAZINE injection 10 mg Q6H PRN    insulin aspart pen 0-4 Units PRN    insulin regular (Humulin R) 100 Units in sodium chloride 0.9% 100 mL infusion Continuous    labetalol injection 10 mg Q4H PRN    magnesium sulfate 2g in water 50mL IVPB (premix) PRN    magnesium sulfate 2g in water 50mL IVPB (premix) PRN    magnesium sulfate 2g in water 50mL IVPB (premix) PRN    methylPREDNISolone sodium succinate injection 60 mg Q12H    Followed by    [START ON 12/30/2017] methylPREDNISolone sodium succinate injection 40 mg Q12H    Followed by    [START ON 12/31/2017] methylPREDNISolone sodium succinate injection 20 mg Q12H    Followed by    [START ON 1/1/2018] predniSONE tablet 20 mg Daily    mupirocin 2 % ointment 1 g BID    mycophenolate capsule 1,000 mg BID    NIFEdipine 24 hr tablet 30 mg Daily    nystatin  100,000 unit/mL suspension 500,000 Units QID    ondansetron disintegrating tablet 4 mg Q8H PRN    oxyCODONE immediate release tablet 5 mg Q4H PRN    pantoprazole EC tablet 40 mg Daily    posaconazole tablet 300 mg Daily    potassium chloride 40 mEq in 100 mL IVPB (FOR CENTRAL LINE ADMINISTRATION ONLY) PRN    And    potassium chloride 20 mEq in 100 mL IVPB (FOR CENTRAL LINE ADMINISTRATION ONLY) PRN    And    potassium chloride 40 mEq in 100 mL IVPB (FOR CENTRAL LINE ADMINISTRATION ONLY) PRN    QUEtiapine tablet 50 mg QHS    sodium bicarbonate tablet 1,300 mg BID    sodium chloride 0.9% flush 3 mL PRN    sulfamethoxazole-trimethoprim 400-80mg per tablet 1 tablet Daily    tacrolimus capsule 1 mg BID    valGANciclovir tablet 450 mg Daily       Objective:     Vital Signs (Most Recent):  Temp: 97.5 °F (36.4 °C) (12/29/17 1100)  Pulse: 84 (12/29/17 1200)  Resp: 18 (12/29/17 1200)  BP: 124/75 (12/29/17 1200)  SpO2: 100 % (12/29/17 1200)  O2 Device (Oxygen Therapy): room air (12/29/17 1200) Vital Signs (24h Range):  Temp:  [97.1 °F (36.2 °C)-98.4 °F (36.9 °C)] 97.5 °F (36.4 °C)  Pulse:  [74-94] 84  Resp:  [12-46] 18  SpO2:  [98 %-100 %] 100 %  BP: (104-164)/(61-96) 124/75  Arterial Line BP: (113-171)/(54-93) 113/56     Weight: 70.9 kg (156 lb 4.9 oz) (12/29/17 0453)  Body mass index is 26.01 kg/m².  Body surface area is 1.8 meters squared.    I/O last 3 completed shifts:  In: 5638 [P.O.:440; I.V.:5198]  Out: 3233 [Urine:116; Drains:186; Other:2931]    Physical Exam   Constitutional: She is oriented to person, place, and time. She appears well-developed and well-nourished. No distress.   HENT:   Head: Normocephalic and atraumatic.   Eyes: Scleral icterus is present.   Neck: Normal range of motion. Neck supple.   Cardiovascular: Normal rate.    No murmur heard.  Pulmonary/Chest: Effort normal. She has decreased breath sounds in the right lower field and the left lower field.   Abdominal: Soft. Bowel sounds are  normal. She exhibits distension.   Musculoskeletal: Normal range of motion. She exhibits no edema or tenderness.   Neurological: She is alert and oriented to person, place, and time.   Skin: Skin is warm. She is not diaphoretic.   Vitals reviewed.      Significant Labs:  ABGs:   Recent Labs  Lab 12/28/17  0854   PH 7.459*   PCO2 22.6*   HCO3 16.0*   POCSATURATED 97   BE -8     BMP:   Recent Labs  Lab 12/29/17  0330     109     105   CO2 25  25   BUN 15  15   CREATININE 1.1  1.1   CALCIUM 7.6*  7.6*   MG 1.9     CBC:   Recent Labs  Lab 12/29/17  0330   WBC 10.00   RBC 2.79*   HGB 8.6*   HCT 23.8*   PLT 80*   MCV 85   MCH 30.8   MCHC 36.1*     CMP:   Recent Labs  Lab 12/29/17  0330     109   CALCIUM 7.6*  7.6*   ALBUMIN 3.0*  3.0*   PROT 5.2*     138   K 4.2  4.2   CO2 25  25     105   BUN 15  15   CREATININE 1.1  1.1   ALKPHOS 93   ALT 84*   AST 43*   BILITOT 3.1*     All labs within the past 24 hours have been reviewed.

## 2017-12-29 NOTE — ASSESSMENT & PLAN NOTE
BG goal 140-180.     Rewrite transition to 1 u/hr.  BG monitoring q4h  Low dose correction scale.    Discharge planning: TBD

## 2017-12-29 NOTE — ASSESSMENT & PLAN NOTE
Neuro:   -H/o HE  -AMS, confusion and not oriented to time, improved lethargy from yesterday, will continue to monitor closely  -Encourage OOB and good sleep practise  - Seroquel 50 nightly, will reduce dosage due to interaction with Posaconazole  - Continue current pain control regimen, PO and IV for breakthrough     Resp:  - Extubated (12/27, POD# 1)  - Minimize supplemental O2 requirements - currently on RA  - Encourage IS, deep breathing, cough     CV:  - HDS  -Hypertension resolved with CRRT - on nifedipine 30mg q daily, also Hydralazine and Labetalol PRN  -Will continue to monitor     Heme: Anemia  - Hgb/Hct 8.6/23.8, unchanged overnight  -Thrombocytopenia - POD# 1 received 2 packs plts d/t thrombocytopenia during line removal was given one, then prolonged oozing and hematoma afterward, thus another pack transfused, hematoma improved, no further bleeding.  - Continue to trend     ID:  - Afebrile, no leukocytosis  -Will monitor for infectious sources  - Continue opportunistic infection prophylaxis     Immuno: Immunocompromised state  - Continue current immunosuppression regimen - Solumedrol taper, Tacrolimus, Cellcept  - Daily Prograf level - will adjust dosage accordingly  - Continue opportunistic infection prophylaxis     Renal:  -Minimal UOP since POD#1 @ 5cc/hr with bump in Cr to 2.1 from baseline 1.3, Unresponsive to Lasix started on CRRT yesterday, tolerated well 2.5L UF, will discuss with nephrology recommendations   -Metabolic acidosis resolved  - Rivera in place  - Strict I/Os     FEN/GI: ESLD s/p OLT 12/26, transaminitis, protein calorie malnutrition  - Liver U/S: see above  - Trend LFTs  - Replace lytes PRN  -Advance diet today     Endo: Hyperglycemia  - Endocrine following, appreciate assistance  - Accuchecks  - Insulin gtt --> will switch to SSI per endocrinology protocols once tolerating diet     Dispo:  -Ready for step down to TSU     Lupe Solorio MD  [employer] General Surgery, PGY2  Transplant

## 2017-12-29 NOTE — PROGRESS NOTES
"[facility]  Endocrinology  Progress Note    Admit Date: 11/23/2017     Reason for Consult: Management ofHyperglycemia     Surgical Procedure and Date: OLT 12/26/2017    HPI:   Patient is a 52 y.o. female with ESLD 2/2 decompensated alcoholic hepatitis who is now s/p liver transplant. Endocrine consulted for BG management. No personal h/o DM, + FH in father and sister per Epic review.     Interval HPI:   BG below goal.  On insulin drip at 1.3 u/hr.  Less insulin needs.  Afebrile. No hypoglycemia.  On steroid taper.    /68 (BP Location: Left arm, Patient Position: Lying)   Pulse 87   Temp 98.4 °F (36.9 °C) (Oral)   Resp 17   Ht 5' 5" (1.651 m)   Wt 70.9 kg (156 lb 4.9 oz)   LMP 01/01/1985 (Approximate) Comment: 1985  SpO2 100%   Breastfeeding? No   BMI 26.01 kg/m²       Labs Reviewed and Include      Recent Labs  Lab 12/29/17  0330     109   CALCIUM 7.6*  7.6*   ALBUMIN 3.0*  3.0*   PROT 5.2*     138   K 4.2  4.2   CO2 25  25     105   BUN 15  15   CREATININE 1.1  1.1   ALKPHOS 93   ALT 84*   AST 43*   BILITOT 3.1*     Lab Results   Component Value Date    WBC 10.00 12/29/2017    HGB 8.6 (L) 12/29/2017    HCT 23.8 (L) 12/29/2017    MCV 85 12/29/2017    PLT 80 (L) 12/29/2017     No results for input(s): TSH, FREET4 in the last 168 hours.  Lab Results   Component Value Date    HGBA1C <4.0 (A) 10/20/2017       Nutritional status:   Body mass index is 26.01 kg/m².  Lab Results   Component Value Date    ALBUMIN 3.0 (L) 12/29/2017    ALBUMIN 3.0 (L) 12/29/2017    ALBUMIN 3.0 (L) 12/28/2017     Lab Results   Component Value Date    PREALBUMIN 4 (L) 11/27/2017    PREALBUMIN 3 (L) 10/20/2017       Estimated Creatinine Clearance: 57.8 mL/min (based on SCr of 1.1 mg/dL).    Accu-Checks  Recent Labs      12/27/17   1610  12/27/17   1918  12/28/17   0027  12/28/17   0425  12/28/17   0746  12/28/17   1258  12/28/17   1646  12/28/17   1937  12/29/17   0012  12/29/17   " 0334   POCTGLUCOSE  172*  165*  160*  170*  164*  122*  130*  122*  133*  107       Current Medications and/or Treatments Impacting Glycemic Control  Immunotherapy:  Immunosuppressants         Stop Route Frequency     mycophenolate capsule 1,000 mg      -- Oral 2 times daily     tacrolimus capsule 1 mg      -- Oral 2 times daily        Steroids:   Hormones     Start     Stop Route Frequency Ordered    01/01/18 0900  predniSONE tablet 20 mg  (methylprednisolone taper panel)      -- Oral Daily 12/26/17 1425    12/31/17 0900  methylPREDNISolone sodium succinate injection 20 mg  (methylprednisolone taper panel)      01/01 0859 IV Every 12 hours 12/26/17 1425    12/30/17 0900  methylPREDNISolone sodium succinate injection 40 mg  (methylprednisolone taper panel)      12/31 0859 IV Every 12 hours 12/26/17 1425    12/29/17 0900  methylPREDNISolone sodium succinate injection 60 mg  (methylprednisolone taper panel)      12/30 0859 IV Every 12 hours 12/26/17 1425        Pressors:    Autonomic Drugs     None        Hyperglycemia/Diabetes Medications: Antihyperglycemics     Start     Stop Route Frequency Ordered    12/27/17 0903  insulin aspart pen 0-4 Units      -- SubQ As needed (PRN) 12/27/17 0803    12/27/17 0803  insulin regular (Humulin R) 100 Units in sodium chloride 0.9% 100 mL infusion      -- IV Continuous 12/27/17 0803          ASSESSMENT and PLAN    * Liver transplant 12/26/2017 for ETOH    Avoid hypoglycemia.        Hyperglycemia    BG goal 140-180.     Rewrite transition to 1 u/hr.  BG monitoring q4h  Low dose correction scale.    Addendum: d/c transition, change to novolog 2 units w/ meals, ac/hs bg monitoring, low dose    Discharge planning: TBD        Long-term use of immunosuppressant medication    May increase insulin resistance and BG readings         Immunosuppression    Increases insulin resistance over time.          NAFLD (nonalcoholic fatty liver disease)    S/p liver transplant            Irielle B  NGUYỄN Mosqueda, FNP  Endocrinology  [facility]

## 2017-12-29 NOTE — SUBJECTIVE & OBJECTIVE
"Interval HPI:   BG below goal.  On insulin drip at 1.3 u/hr.  Less insulin needs.  Afebrile. No hypoglycemia.  On steroid taper.    /68 (BP Location: Left arm, Patient Position: Lying)   Pulse 87   Temp 98.4 °F (36.9 °C) (Oral)   Resp 17   Ht 5' 5" (1.651 m)   Wt 70.9 kg (156 lb 4.9 oz)   LMP 01/01/1985 (Approximate) Comment: 1985  SpO2 100%   Breastfeeding? No   BMI 26.01 kg/m²     Labs Reviewed and Include      Recent Labs  Lab 12/29/17  0330     109   CALCIUM 7.6*  7.6*   ALBUMIN 3.0*  3.0*   PROT 5.2*     138   K 4.2  4.2   CO2 25  25     105   BUN 15  15   CREATININE 1.1  1.1   ALKPHOS 93   ALT 84*   AST 43*   BILITOT 3.1*     Lab Results   Component Value Date    WBC 10.00 12/29/2017    HGB 8.6 (L) 12/29/2017    HCT 23.8 (L) 12/29/2017    MCV 85 12/29/2017    PLT 80 (L) 12/29/2017     No results for input(s): TSH, FREET4 in the last 168 hours.  Lab Results   Component Value Date    HGBA1C <4.0 (A) 10/20/2017       Nutritional status:   Body mass index is 26.01 kg/m².  Lab Results   Component Value Date    ALBUMIN 3.0 (L) 12/29/2017    ALBUMIN 3.0 (L) 12/29/2017    ALBUMIN 3.0 (L) 12/28/2017     Lab Results   Component Value Date    PREALBUMIN 4 (L) 11/27/2017    PREALBUMIN 3 (L) 10/20/2017       Estimated Creatinine Clearance: 57.8 mL/min (based on SCr of 1.1 mg/dL).    Accu-Checks  Recent Labs      12/27/17   1610  12/27/17   1918  12/28/17   0027  12/28/17   0425  12/28/17   0746  12/28/17   1258  12/28/17   1646  12/28/17   1937  12/29/17   0012  12/29/17   0334   POCTGLUCOSE  172*  165*  160*  170*  164*  122*  130*  122*  133*  107       Current Medications and/or Treatments Impacting Glycemic Control  Immunotherapy:  Immunosuppressants         Stop Route Frequency     mycophenolate capsule 1,000 mg      -- Oral 2 times daily     tacrolimus capsule 1 mg      -- Oral 2 times daily        Steroids:   Hormones     Start     Stop Route Frequency Ordered    01/01/18 " 0900  predniSONE tablet 20 mg  (methylprednisolone taper panel)      -- Oral Daily 12/26/17 1425    12/31/17 0900  methylPREDNISolone sodium succinate injection 20 mg  (methylprednisolone taper panel)      01/01 0859 IV Every 12 hours 12/26/17 1425    12/30/17 0900  methylPREDNISolone sodium succinate injection 40 mg  (methylprednisolone taper panel)      12/31 0859 IV Every 12 hours 12/26/17 1425    12/29/17 0900  methylPREDNISolone sodium succinate injection 60 mg  (methylprednisolone taper panel)      12/30 0859 IV Every 12 hours 12/26/17 1425        Pressors:    Autonomic Drugs     None        Hyperglycemia/Diabetes Medications: Antihyperglycemics     Start     Stop Route Frequency Ordered    12/27/17 0903  insulin aspart pen 0-4 Units      -- SubQ As needed (PRN) 12/27/17 0803 12/27/17 0803  insulin regular (Humulin R) 100 Units in sodium chloride 0.9% 100 mL infusion      -- IV Continuous 12/27/17 0803

## 2017-12-29 NOTE — PROGRESS NOTES
[facility]  Nephrology  Progress Note    Patient Name: Marcie Bazan  MRN: [id number]  Admission Date: 11/23/2017  Hospital Length of Stay: 36 days  Attending Provider: Harry Delarosa MD   Primary Care Physician: ODILIA Wall  Principal Problem:S/P liver transplant    Subjective:     HPI: 52 year old female with PMHx of 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 Patient presented to [facility] with generalized weakness. Onset two months ago, progressively worsening. Was admitted to  11/10 for hyponatremia seen on labs in GI office. Discharged on 11/15 and returned on 11/20 with 1 week worsening weakness, malaise, lethargy, and decreased appetite. Denied fever/chills, abdominal pain, bright red or black tarry stools, hematemesis or other abnormal bleeding, confusion or disorientation, changes in urination, light-headedness, headache, changes in vision, dyspnea, chest pain or palpitations. Labs demonstrated Na 116 and MELD 32; patient admitted for hyponatremia. Nephrology consulted, recommended fluid restriction , holding diuretics, and prednisode taper that started 11/23 (decreased from 40mg QD to 20mg Qd.) Patient transferred to [facility] for revaluation of liver disease. Of note, paracentesis performed 11/22, removed 2L, negative for SBP. Liver biopsy 10/24 demonstrated stage 4 cirrhosis. Records reports EGD in August 2016 that showed small esophageal varices with severe gastritis. Patient accepted for Liver transplant. Had liver transplant on 12/26/17. No evident complications during surgery. Was making good amount of urine. On next day presented with DEREK were serum creatinine increased from 1.2 -->1.6 and urinary output started to decreased. Today sCr continued to trend upward to 2.1, serum bicarbonate decreasing to 15, anuric and  chest x ray present moderate edema. This is when nephrology was consulted.     Interval History:   Patent feeling better today, had SLED done yesterday without any complaints, total intake yesterday 4.2 L and output 61 cc of urine. NET positive 1.2 L.     Review of patient's allergies indicates:   Allergen Reactions    Ferrous sulfate Other (See Comments)     Patient states the pill makes her sick. She stated she would rather have a shot     Current Facility-Administered Medications   Medication Frequency    0.9%  NaCl infusion (CRRT USE ONLY) PRN    0.9%  NaCl infusion (for blood administration) Q24H PRN    0.9%  NaCl infusion (for blood administration) Q24H PRN    calcium gluconate 1 g in dextrose 5 % 100 mL IVPB (premix) PRN    calcium gluconate 1 g in dextrose 5 % 100 mL IVPB (premix) PRN    calcium gluconate 1 g in dextrose 5 % 100 mL IVPB (premix) PRN    dextrose 50% injection 12.5 g PRN    dextrose 50% injection 25 g PRN    famotidine tablet 20 mg QHS    furosemide injection 120 mg BID    glucagon (human recombinant) injection 1 mg PRN    glucose chewable tablet 16 g PRN    glucose chewable tablet 24 g PRN    heparin (porcine) injection 5,000 Units Q8H    hydrALAZINE injection 10 mg Q6H PRN    insulin aspart pen 0-4 Units PRN    insulin regular (Humulin R) 100 Units in sodium chloride 0.9% 100 mL infusion Continuous    labetalol injection 10 mg Q4H PRN    magnesium sulfate 2g in water 50mL IVPB (premix) PRN    magnesium sulfate 2g in water 50mL IVPB (premix) PRN    magnesium sulfate 2g in water 50mL IVPB (premix) PRN    methylPREDNISolone sodium succinate injection 60 mg Q12H    Followed by    [START ON 12/30/2017] methylPREDNISolone sodium succinate injection 40 mg Q12H    Followed by    [START ON 12/31/2017] methylPREDNISolone sodium succinate injection 20 mg Q12H    Followed by    [START ON 1/1/2018] predniSONE tablet 20 mg Daily    mupirocin 2 % ointment 1 g BID     mycophenolate capsule 1,000 mg BID    NIFEdipine 24 hr tablet 30 mg Daily    nystatin 100,000 unit/mL suspension 500,000 Units QID    ondansetron disintegrating tablet 4 mg Q8H PRN    oxyCODONE immediate release tablet 5 mg Q4H PRN    pantoprazole EC tablet 40 mg Daily    posaconazole tablet 300 mg Daily    potassium chloride 40 mEq in 100 mL IVPB (FOR CENTRAL LINE ADMINISTRATION ONLY) PRN    And    potassium chloride 20 mEq in 100 mL IVPB (FOR CENTRAL LINE ADMINISTRATION ONLY) PRN    And    potassium chloride 40 mEq in 100 mL IVPB (FOR CENTRAL LINE ADMINISTRATION ONLY) PRN    QUEtiapine tablet 50 mg QHS    sodium bicarbonate tablet 1,300 mg BID    sodium chloride 0.9% flush 3 mL PRN    sulfamethoxazole-trimethoprim 400-80mg per tablet 1 tablet Daily    tacrolimus capsule 1 mg BID    valGANciclovir tablet 450 mg Daily       Objective:     Vital Signs (Most Recent):  Temp: 97.5 °F (36.4 °C) (12/29/17 1100)  Pulse: 84 (12/29/17 1200)  Resp: 18 (12/29/17 1200)  BP: 124/75 (12/29/17 1200)  SpO2: 100 % (12/29/17 1200)  O2 Device (Oxygen Therapy): room air (12/29/17 1200) Vital Signs (24h Range):  Temp:  [97.1 °F (36.2 °C)-98.4 °F (36.9 °C)] 97.5 °F (36.4 °C)  Pulse:  [74-94] 84  Resp:  [12-46] 18  SpO2:  [98 %-100 %] 100 %  BP: (104-164)/(61-96) 124/75  Arterial Line BP: (113-171)/(54-93) 113/56     Weight: 70.9 kg (156 lb 4.9 oz) (12/29/17 0453)  Body mass index is 26.01 kg/m².  Body surface area is 1.8 meters squared.    I/O last 3 completed shifts:  In: 5638 [P.O.:440; I.V.:5198]  Out: 3233 [Urine:116; Drains:186; Other:2931]    Physical Exam   Constitutional: She is oriented to person, place, and time. She appears well-developed and well-nourished. No distress.   HENT:   Head: Normocephalic and atraumatic.   Eyes: Scleral icterus is present.   Neck: Normal range of motion. Neck supple.   Cardiovascular: Normal rate.    No murmur heard.  Pulmonary/Chest: Effort normal. She has decreased breath sounds in  the right lower field and the left lower field.   Abdominal: Soft. Bowel sounds are normal. She exhibits distension.   Musculoskeletal: Normal range of motion. She exhibits no edema or tenderness.   Neurological: She is alert and oriented to person, place, and time.   Skin: Skin is warm. She is not diaphoretic.   Vitals reviewed.      Significant Labs:  ABGs:   Recent Labs  Lab 12/28/17  0854   PH 7.459*   PCO2 22.6*   HCO3 16.0*   POCSATURATED 97   BE -8     BMP:   Recent Labs  Lab 12/29/17  0330     109     105   CO2 25  25   BUN 15  15   CREATININE 1.1  1.1   CALCIUM 7.6*  7.6*   MG 1.9     CBC:   Recent Labs  Lab 12/29/17  0330   WBC 10.00   RBC 2.79*   HGB 8.6*   HCT 23.8*   PLT 80*   MCV 85   MCH 30.8   MCHC 36.1*     CMP:   Recent Labs  Lab 12/29/17  0330     109   CALCIUM 7.6*  7.6*   ALBUMIN 3.0*  3.0*   PROT 5.2*     138   K 4.2  4.2   CO2 25  25     105   BUN 15  15   CREATININE 1.1  1.1   ALKPHOS 93   ALT 84*   AST 43*   BILITOT 3.1*     All labs within the past 24 hours have been reviewed.     Assessment/Plan:     DEREK (acute kidney injury)     Marcie Bazan is a 52 year old female with pass history of liver cirrhosis secondary to ETOH, who recently had liver transplanat on 12/26/17, which afterward presented with DEREK that mostly secondary to ischemic ATN after surgery, serum creatinine has been worsening and became anuric.     Plan:   - Serum creatinine baseline 0.7-0.9  - Currently with sCr 1.1 after SLED yesterday   -Today CVP= 5   - Will Hold on SLED today and reassess tomorrow if require dialysis   - Urine sediment presented with few RBCs (no dysmorphic cells), no casts   - Renal ultrasound with decrease perfusion and increase renal cortical echogenicity suggesting chronic renal disease.  No evidence of hydronephrosis, masses or nephrolysiasis   -Avoid nephrotoxic medications and renal dose medications  - Continue with intake and output             Vito  Cate Capellan  Nephrology  Fellow  [facility]    Pager [phone]    Patient seen and examined with Dr Esposito;   I have reviewed and agree with assessment and plan

## 2017-12-30 PROBLEM — B95.2 ENTEROCOCCUS UTI: Status: ACTIVE | Noted: 2017-12-30

## 2017-12-30 PROBLEM — Z91.89 AT RISK FOR OPPORTUNISTIC INFECTIONS: Status: ACTIVE | Noted: 2017-12-30

## 2017-12-30 PROBLEM — D84.9 IMMUNOSUPPRESSED STATUS: Status: RESOLVED | Noted: 2017-11-27 | Resolved: 2017-12-30

## 2017-12-30 PROBLEM — K72.90 DECOMPENSATED HEPATIC CIRRHOSIS: Status: RESOLVED | Noted: 2017-12-29 | Resolved: 2017-12-30

## 2017-12-30 PROBLEM — K74.60 DECOMPENSATED HEPATIC CIRRHOSIS: Status: RESOLVED | Noted: 2017-12-29 | Resolved: 2017-12-30

## 2017-12-30 PROBLEM — E83.42 HYPOMAGNESEMIA: Status: RESOLVED | Noted: 2017-10-19 | Resolved: 2017-12-30

## 2017-12-30 PROBLEM — D84.9 IMMUNOSUPPRESSION: Status: RESOLVED | Noted: 2017-12-26 | Resolved: 2017-12-30

## 2017-12-30 PROBLEM — R18.8 ASCITES: Status: RESOLVED | Noted: 2017-09-11 | Resolved: 2017-12-30

## 2017-12-30 PROBLEM — B25.9 CYTOMEGALOVIRUS (CMV) VIREMIA: Status: RESOLVED | Noted: 2017-11-25 | Resolved: 2017-12-30

## 2017-12-30 PROBLEM — R73.9 HYPERGLYCEMIA: Status: RESOLVED | Noted: 2017-12-26 | Resolved: 2017-12-30

## 2017-12-30 PROBLEM — N39.0 ENTEROCOCCUS UTI: Status: ACTIVE | Noted: 2017-12-30

## 2017-12-30 PROBLEM — E43 SEVERE PROTEIN-CALORIE MALNUTRITION: Status: RESOLVED | Noted: 2017-10-19 | Resolved: 2017-12-30

## 2017-12-30 LAB
ALBUMIN SERPL BCP-MCNC: 3 G/DL
ALBUMIN SERPL BCP-MCNC: 3 G/DL
ALP SERPL-CCNC: 114 U/L
ALT SERPL W/O P-5'-P-CCNC: 101 U/L
ANION GAP SERPL CALC-SCNC: 8 MMOL/L
ANION GAP SERPL CALC-SCNC: 8 MMOL/L
APTT BLDCRRT: 29.7 SEC
AST SERPL-CCNC: 46 U/L
BASOPHILS # BLD AUTO: 0 K/UL
BASOPHILS NFR BLD: 0 %
BILIRUB DIRECT SERPL-MCNC: 2.1 MG/DL
BILIRUB SERPL-MCNC: 2.9 MG/DL
BUN SERPL-MCNC: 24 MG/DL
BUN SERPL-MCNC: 24 MG/DL
CALCIUM SERPL-MCNC: 7.7 MG/DL
CALCIUM SERPL-MCNC: 7.7 MG/DL
CHLORIDE SERPL-SCNC: 100 MMOL/L
CHLORIDE SERPL-SCNC: 100 MMOL/L
CO2 SERPL-SCNC: 27 MMOL/L
CO2 SERPL-SCNC: 27 MMOL/L
CREAT SERPL-MCNC: 1.9 MG/DL
CREAT SERPL-MCNC: 1.9 MG/DL
DIFFERENTIAL METHOD: ABNORMAL
EOSINOPHIL # BLD AUTO: 0 K/UL
EOSINOPHIL NFR BLD: 0 %
ERYTHROCYTE [DISTWIDTH] IN BLOOD BY AUTOMATED COUNT: 15.9 %
EST. GFR  (AFRICAN AMERICAN): 34 ML/MIN/1.73 M^2
EST. GFR  (AFRICAN AMERICAN): 34 ML/MIN/1.73 M^2
EST. GFR  (NON AFRICAN AMERICAN): 29.5 ML/MIN/1.73 M^2
EST. GFR  (NON AFRICAN AMERICAN): 29.5 ML/MIN/1.73 M^2
GGT SERPL-CCNC: 148 U/L
GLUCOSE SERPL-MCNC: 115 MG/DL
GLUCOSE SERPL-MCNC: 115 MG/DL
HCT VFR BLD AUTO: 25.3 %
HGB BLD-MCNC: 8.6 G/DL
IMM GRANULOCYTES # BLD AUTO: 0.14 K/UL
IMM GRANULOCYTES NFR BLD AUTO: 1.7 %
INR PPP: 1.2
LYMPHOCYTES # BLD AUTO: 1.2 K/UL
LYMPHOCYTES NFR BLD: 15 %
MAGNESIUM SERPL-MCNC: 2.5 MG/DL
MCH RBC QN AUTO: 30.7 PG
MCHC RBC AUTO-ENTMCNC: 34 G/DL
MCV RBC AUTO: 90 FL
MONOCYTES # BLD AUTO: 0.6 K/UL
MONOCYTES NFR BLD: 7.2 %
NEUTROPHILS # BLD AUTO: 6.3 K/UL
NEUTROPHILS NFR BLD: 76.1 %
NRBC BLD-RTO: 3 /100 WBC
PHOSPHATE SERPL-MCNC: 3.5 MG/DL
PLATELET # BLD AUTO: 59 K/UL
PMV BLD AUTO: 11.2 FL
POCT GLUCOSE: 116 MG/DL (ref 70–110)
POCT GLUCOSE: 119 MG/DL (ref 70–110)
POCT GLUCOSE: 124 MG/DL (ref 70–110)
POCT GLUCOSE: 89 MG/DL (ref 70–110)
POTASSIUM SERPL-SCNC: 4.3 MMOL/L
POTASSIUM SERPL-SCNC: 4.3 MMOL/L
PROT SERPL-MCNC: 5 G/DL
PROTHROMBIN TIME: 12.6 SEC
RBC # BLD AUTO: 2.8 M/UL
SODIUM SERPL-SCNC: 135 MMOL/L
SODIUM SERPL-SCNC: 135 MMOL/L
TACROLIMUS BLD-MCNC: 7 NG/ML
WBC # BLD AUTO: 8.25 K/UL

## 2017-12-30 PROCEDURE — 25000003 PHARM REV CODE 250: Performed by: STUDENT IN AN ORGANIZED HEALTH CARE EDUCATION/TRAINING PROGRAM

## 2017-12-30 PROCEDURE — 84100 ASSAY OF PHOSPHORUS: CPT

## 2017-12-30 PROCEDURE — 80053 COMPREHEN METABOLIC PANEL: CPT

## 2017-12-30 PROCEDURE — 85730 THROMBOPLASTIN TIME PARTIAL: CPT

## 2017-12-30 PROCEDURE — 25000003 PHARM REV CODE 250: Performed by: NURSE PRACTITIONER

## 2017-12-30 PROCEDURE — 99232 SBSQ HOSP IP/OBS MODERATE 35: CPT | Mod: ,,, | Performed by: INTERNAL MEDICINE

## 2017-12-30 PROCEDURE — 80197 ASSAY OF TACROLIMUS: CPT

## 2017-12-30 PROCEDURE — 63600175 PHARM REV CODE 636 W HCPCS: Performed by: TRANSPLANT SURGERY

## 2017-12-30 PROCEDURE — 83735 ASSAY OF MAGNESIUM: CPT

## 2017-12-30 PROCEDURE — 20600001 HC STEP DOWN PRIVATE ROOM

## 2017-12-30 PROCEDURE — 82977 ASSAY OF GGT: CPT

## 2017-12-30 PROCEDURE — 63600175 PHARM REV CODE 636 W HCPCS: Performed by: STUDENT IN AN ORGANIZED HEALTH CARE EDUCATION/TRAINING PROGRAM

## 2017-12-30 PROCEDURE — 82248 BILIRUBIN DIRECT: CPT

## 2017-12-30 PROCEDURE — 99233 SBSQ HOSP IP/OBS HIGH 50: CPT | Mod: 24,,, | Performed by: NURSE PRACTITIONER

## 2017-12-30 PROCEDURE — 85025 COMPLETE CBC W/AUTO DIFF WBC: CPT

## 2017-12-30 PROCEDURE — 85610 PROTHROMBIN TIME: CPT

## 2017-12-30 RX ORDER — AMOXICILLIN 500 MG/1
500 CAPSULE ORAL EVERY 24 HOURS
Status: DISCONTINUED | OUTPATIENT
Start: 2017-12-30 | End: 2018-01-04

## 2017-12-30 RX ADMIN — POSACONAZOLE 300 MG: 100 TABLET, COATED ORAL at 08:12

## 2017-12-30 RX ADMIN — TACROLIMUS 1 MG: 1 CAPSULE ORAL at 08:12

## 2017-12-30 RX ADMIN — HEPARIN SODIUM 5000 UNITS: 5000 INJECTION, SOLUTION INTRAVENOUS; SUBCUTANEOUS at 08:12

## 2017-12-30 RX ADMIN — METHYLPREDNISOLONE SODIUM SUCCINATE 40 MG: 40 INJECTION, POWDER, FOR SOLUTION INTRAMUSCULAR; INTRAVENOUS at 08:12

## 2017-12-30 RX ADMIN — MUPIROCIN 1 G: 20 OINTMENT TOPICAL at 08:12

## 2017-12-30 RX ADMIN — PANTOPRAZOLE SODIUM 40 MG: 40 TABLET, DELAYED RELEASE ORAL at 08:12

## 2017-12-30 RX ADMIN — DOCUSATE SODIUM 100 MG: 100 CAPSULE, LIQUID FILLED ORAL at 08:12

## 2017-12-30 RX ADMIN — QUETIAPINE FUMARATE 25 MG: 25 TABLET, FILM COATED ORAL at 08:12

## 2017-12-30 RX ADMIN — MYCOPHENOLATE MOFETIL 1000 MG: 250 CAPSULE ORAL at 08:12

## 2017-12-30 RX ADMIN — DOCUSATE SODIUM 100 MG: 100 CAPSULE, LIQUID FILLED ORAL at 01:12

## 2017-12-30 RX ADMIN — DOCUSATE SODIUM 100 MG: 100 CAPSULE, LIQUID FILLED ORAL at 05:12

## 2017-12-30 RX ADMIN — HEPARIN SODIUM 5000 UNITS: 5000 INJECTION, SOLUTION INTRAVENOUS; SUBCUTANEOUS at 01:12

## 2017-12-30 RX ADMIN — HEPARIN SODIUM 5000 UNITS: 5000 INJECTION, SOLUTION INTRAVENOUS; SUBCUTANEOUS at 05:12

## 2017-12-30 RX ADMIN — SODIUM BICARBONATE 650 MG TABLET 1300 MG: at 08:12

## 2017-12-30 RX ADMIN — AMOXICILLIN 500 MG: 500 CAPSULE ORAL at 09:12

## 2017-12-30 RX ADMIN — NIFEDIPINE 30 MG: 30 TABLET, FILM COATED, EXTENDED RELEASE ORAL at 08:12

## 2017-12-30 RX ADMIN — TACROLIMUS 1 MG: 1 CAPSULE ORAL at 05:12

## 2017-12-30 RX ADMIN — FAMOTIDINE 20 MG: 20 TABLET, FILM COATED ORAL at 08:12

## 2017-12-30 NOTE — PROGRESS NOTES
DARIELA Arellano gave verbal to bladder scan the patient if she does not urinate by 1330.  Bladder scanned the patient, scanned showed greater than 400ccs.  The patient has ascites, and yesterday total UO was 285.  MD Vickers notified of situation ordered to I/O cath patient.

## 2017-12-30 NOTE — SUBJECTIVE & OBJECTIVE
"Interval HPI:   Overnight events: Off insulin gtt since yesterday afternoon. Glucose at or below goal with no insulin needs.  Eatin% for dinner. Ate better earlier in the day per family member.  Nausea: No  Hypoglycemia and intervention: No  Fever: No  TPN and/or TF: No    /62 (BP Location: Right arm, Patient Position: Lying)   Pulse 83   Temp 97.5 °F (36.4 °C) (Oral)   Resp 18   Ht 5' 5" (1.651 m)   Wt 71.5 kg (157 lb 10.1 oz)   LMP 1985 (Approximate) Comment:   SpO2 96%   Breastfeeding? No   BMI 26.23 kg/m²     Labs Reviewed and Include      Recent Labs  Lab 17  0430   *  115*   CALCIUM 7.7*  7.7*   ALBUMIN 3.0*  3.0*   PROT 5.0*   *  135*   K 4.3  4.3   CO2 27  27     100   BUN 24*  24*   CREATININE 1.9*  1.9*   ALKPHOS 114   *   AST 46*   BILITOT 2.9*     Lab Results   Component Value Date    WBC 8.25 2017    HGB 8.6 (L) 2017    HCT 25.3 (L) 2017    MCV 90 2017    PLT 59 (L) 2017     No results for input(s): TSH, FREET4 in the last 168 hours.  Lab Results   Component Value Date    HGBA1C <4.0 (A) 10/20/2017       Nutritional status:   Body mass index is 26.23 kg/m².  Lab Results   Component Value Date    ALBUMIN 3.0 (L) 2017    ALBUMIN 3.0 (L) 2017    ALBUMIN 3.0 (L) 2017     Lab Results   Component Value Date    PREALBUMIN 4 (L) 2017    PREALBUMIN 3 (L) 10/20/2017       Estimated Creatinine Clearance: 33.6 mL/min (based on SCr of 1.9 mg/dL (H)).    Accu-Checks  Recent Labs      17   0746  17   1258  17   1646  17   1937  17   0012  17   0334  17   0755  17   1245  17   1812  17   2213   POCTGLUCOSE  164*  122*  130*  122*  133*  107  105  98  123*  130*       Current Medications and/or Treatments Impacting Glycemic Control  Immunotherapy:  Immunosuppressants         Stop Route Frequency     mycophenolate capsule 1,000 mg      " -- Oral 2 times daily     tacrolimus capsule 1 mg      -- Oral 2 times daily        Steroids:   Hormones     Start     Stop Route Frequency Ordered    01/01/18 0900  predniSONE tablet 20 mg  (methylprednisolone taper panel)      -- Oral Daily 12/26/17 1425    12/31/17 0900  methylPREDNISolone sodium succinate injection 20 mg  (methylprednisolone taper panel)      01/01 0859 IV Every 12 hours 12/26/17 1425    12/30/17 0900  methylPREDNISolone sodium succinate injection 40 mg  (methylprednisolone taper panel)      12/31 0859 IV Every 12 hours 12/26/17 1425        Pressors:    Autonomic Drugs     None        Hyperglycemia/Diabetes Medications: Antihyperglycemics     Start     Stop Route Frequency Ordered    12/29/17 1544  insulin aspart pen 0-5 Units      -- SubQ Before meals & nightly PRN 12/29/17 1443

## 2017-12-30 NOTE — PROGRESS NOTES
"[facility]  Endocrinology  Progress Note    Admit Date: 2017     Reason for Consult: Management ofHyperglycemia     Surgical Procedure and Date: OLT 2017    HPI:   Patient is a 52 y.o. female with ESLD 2/2 decompensated alcoholic hepatitis who is now s/p liver transplant. Endocrine consulted for BG management. No personal h/o DM, + FH in father and sister per Epic review.     Interval HPI:   Overnight events: Off insulin gtt since yesterday afternoon. Glucose at or below goal with no insulin needs.  Eatin% for dinner. Ate better earlier in the day per family member.  Nausea: No  Hypoglycemia and intervention: No  Fever: No  TPN and/or TF: No    /62 (BP Location: Right arm, Patient Position: Lying)   Pulse 83   Temp 97.5 °F (36.4 °C) (Oral)   Resp 18   Ht 5' 5" (1.651 m)   Wt 71.5 kg (157 lb 10.1 oz)   LMP 1985 (Approximate) Comment:   SpO2 96%   Breastfeeding? No   BMI 26.23 kg/m²       Labs Reviewed and Include      Recent Labs  Lab 17  0430   *  115*   CALCIUM 7.7*  7.7*   ALBUMIN 3.0*  3.0*   PROT 5.0*   *  135*   K 4.3  4.3   CO2 27  27     100   BUN 24*  24*   CREATININE 1.9*  1.9*   ALKPHOS 114   *   AST 46*   BILITOT 2.9*     Lab Results   Component Value Date    WBC 8.25 2017    HGB 8.6 (L) 2017    HCT 25.3 (L) 2017    MCV 90 2017    PLT 59 (L) 2017     No results for input(s): TSH, FREET4 in the last 168 hours.  Lab Results   Component Value Date    HGBA1C <4.0 (A) 10/20/2017       Nutritional status:   Body mass index is 26.23 kg/m².  Lab Results   Component Value Date    ALBUMIN 3.0 (L) 2017    ALBUMIN 3.0 (L) 2017    ALBUMIN 3.0 (L) 2017     Lab Results   Component Value Date    PREALBUMIN 4 (L) 2017    PREALBUMIN 3 (L) 10/20/2017       Estimated Creatinine Clearance: 33.6 mL/min (based on SCr of 1.9 mg/dL (H)).    Accu-Checks  Recent Labs      " 12/28/17   0746  12/28/17   1258  12/28/17   1646  12/28/17   1937  12/29/17   0012  12/29/17   0334  12/29/17   0755  12/29/17   1245  12/29/17   1812  12/29/17   2213   POCTGLUCOSE  164*  122*  130*  122*  133*  107  105  98  123*  130*       Current Medications and/or Treatments Impacting Glycemic Control  Immunotherapy:  Immunosuppressants         Stop Route Frequency     mycophenolate capsule 1,000 mg      -- Oral 2 times daily     tacrolimus capsule 1 mg      -- Oral 2 times daily        Steroids:   Hormones     Start     Stop Route Frequency Ordered    01/01/18 0900  predniSONE tablet 20 mg  (methylprednisolone taper panel)      -- Oral Daily 12/26/17 1425    12/31/17 0900  methylPREDNISolone sodium succinate injection 20 mg  (methylprednisolone taper panel)      01/01 0859 IV Every 12 hours 12/26/17 1425    12/30/17 0900  methylPREDNISolone sodium succinate injection 40 mg  (methylprednisolone taper panel)      12/31 0859 IV Every 12 hours 12/26/17 1425        Pressors:    Autonomic Drugs     None        Hyperglycemia/Diabetes Medications: Antihyperglycemics     Start     Stop Route Frequency Ordered    12/29/17 1544  insulin aspart pen 0-5 Units      -- SubQ Before meals & nightly PRN 12/29/17 1444          ASSESSMENT and PLAN    * Liver transplant 12/26/2017 for ETOH    Avoid hypoglycemia.        Long-term use of immunosuppressant medication    May increase insulin resistance and BG readings         Adrenal cortical steroids causing adverse effect in therapeutic use    Standard SM taper beginning 12/27/2017.  Increases BG, mostly prandial needs.           Immunosuppression    Increases insulin resistance over time.          Hyperglycemia    BG goal 140-180.     Currently at or below goal glucose with no insulin requirements.    Stop scheduled prandial insulin.  POC glucose AC, hs  Low dose correction    If still at goal in 24 hours, okay to discontinue POC glucose checks.              No further insulin  requirements. Will sign off, but please call with questions.    Baldo Naranjo MD  Endocrinology  [facility]

## 2017-12-30 NOTE — PROGRESS NOTES
[facility]  Liver Transplant  Progress Note    Patient Name: Marcie Bazan  MRN: [id number]  Admission Date: 2017  Hospital Length of Stay: 37 days  Code Status: Full Code  Primary Care Provider: ODILIA Wall  Post-Operative Day: 4    ORGAN:   LIVER  Disease Etiology: Alcoholic Cirrhosis  Donor Type:    - Brain Death  CDC High Risk:   No  Donor CMV Status:   Donor CMV Status: Negative  Donor HBcAB:   Negative  Donor HCV Status:   Negative  Whole or Partial: Whole Liver  Biliary Anastomosis: End to End  Arterial Anatomy: Standard  Subjective:     History of Present Illness:  HPI:  52 year old female with PMHx of 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 Patient presented to [facility] with generalized weakness. Onset two months ago, progressively worsening. Was admitted to  11/10 for hyponatremia seen on labs in GI office. Discharged on 11/15 and returned on  with 1 week worsening weakness, malaise, lethargy, and decreased appetite. Denied fever/chills, abdominal pain, bright red or black tarry stools, hematemesis or other abnormal bleeding, confusion or disorientation, changes in urination, light-headedness, headache, changes in vision, dyspnea, chest pain or palpitations. Labs demonstrated Na 116 and MELD 32; patient admitted for hyponatremia. Nephrology consulted, recommended fluid restriction , holding diuretics, and prednisode taper that started  (decreased from 40mg QD to 20mg Qd.) Patient transferred to [facility] for revaluation of liver disease. Of note, paracentesis performed , removed 2L, negative for SBP.     Follows with Dr. Souza for cirrhosis. Since early 2017, patient's liver failure progressing indicated by worsening clinical signs (abdominal distention, weakness/fatigue) and MELD. In  mid-October, patient's PETH demonstrated EtOH use and she was denied candidacy for liver transplant. She was then enrolled in in-patient substance abuse program at [facility] where she has remained since early November; per patient, her last EtOH beverage was 11/01/2017. Denies history of GI bleed, SBP, renal disease, or other complications of cirrhosis. Liver biopsy 10/24 demonstrated stage 4 cirrhosis. Records reports EGD in August 2016 that showed small esophageal varices with severe gastritis. Colonoscopy 2015 with 2 polyps and internal hemorrhoids Started on prednisone 40mg QD on 10/25; was on spironolactone, has been held since 11/10 for hypoNa; on lactulose.    Ms. Bazan is now s/p OLTX 12/26/17 for ETOH cirrhosis and PERKINS (steroid induction, DBD, CMV D-/R+). Post op course with confusion and disorientation. Mental status improving. Currently she is taking seroquel night. Nephrology following. CRRT started 12/28.     Hospital Course:  Mental status improving with hepatic encephalopathy treatment. Started empiric ceftriaxone given leukocytosis in immunocompromised state. CMV PCR positive, for which ID was consulted, recommended repeating titers in 1 week. Seen by Psychiatry and  who determined she was moderate risk for Liver transplant. EGD/Cscope and mammogram ordered. Colonoscopy not performed due to poor prep, EGD revealed large varices which were banded. IR paracentesis ordered after patient started complaining of abdominal pain. Mammogram performed, IR paracentesis after INR Reversal negative for SBP.  As pt with low risk malignancy, it was decided not to proceed with a breast biopsy.  Patient accepted for Liver transplant. Listed with MELD 35. Patient was slightly confused and altered, which resolved with lactulose enemas.     Liver transplant 12/27/2017 (DBD, EBV D+/R+, CMV D-/R+, donor normal anatomy)     POD# 0 - Slow to arouse from sedation, not following commands, minimal vent setting  however unable to extubate due to mental status. Poor UOP.     POD#1 - Extubated, pt more alert and able to follow commands however confused and disoriented. Poor UOP 10-15 cc/hr. LFTs trending down , normalized INR, Liver US within normal limits. Thrombocytopenia, 1 pack plts given with line removal, transducer in RIJ  Developed hematoma, required holding pressure for 1.5hrs and another pack of plts.     POD#2 - Improve mental status AAO to person and place, not situation or time. Continues to have minimal UOP not responsive to lasix, Nephrology consulted, started CRRT. LFTs continue to trend down, INR normalized, plts 136     POD# 3 - UOP remaines 5cc/hr, tolerated CRRT for 10 hours, 2.5L UF. Mental status continues to clear up. AF, HDS, tolerating diet, OOB, pain controlled.    Interval history: pt transferred from SICU to TSU on 12/29 (POD 3). Pt alert and awake but with slowed mentation. Continue seroquel nightly. LFTs improving. POD 1 US showed elevated RIs of 1 and two perihepatic fluid collections. Will repeat on POD 6. Pt with (+) enterococcus UTI prior to txp. Repeat with no growth. Will treat with amoxil 500 mg po q24h. Cr 1.9. Nephrology following. Assess daily for need for CRRT. Dc stevens catheter. Pt appetite fair. Will check prealbumin in AM. No complaints of pain. No acute events overnight. Monitor.     Scheduled Meds:   amoxicillin  500 mg Oral Daily    docusate sodium  100 mg Oral TID    famotidine  20 mg Oral QHS    heparin (porcine)  5,000 Units Subcutaneous Q8H    methylPREDNISolone sodium succinate  40 mg Intravenous Q12H    Followed by    [START ON 12/31/2017] methylPREDNISolone sodium succinate  20 mg Intravenous Q12H    Followed by    [START ON 1/1/2018] predniSONE  20 mg Oral Daily    mupirocin  1 g Nasal BID    mycophenolate  1,000 mg Oral BID    pantoprazole  40 mg Oral Daily    posaconazole  300 mg Oral Daily    QUEtiapine  25 mg Oral QHS    [START ON 1/1/2018]  sulfamethoxazole-trimethoprim 400-80mg  1 tablet Oral Every Mon, Wed, Fri    tacrolimus  1 mg Oral BID    [START ON 1/1/2018] valGANciclovir  450 mg Oral Every Mon, Wed, Fri     Continuous Infusions:  PRN Meds:calcium gluconate IVPB, calcium gluconate IVPB, calcium gluconate IVPB, dextrose 50%, dextrose 50%, glucagon (human recombinant), glucose, glucose, hydrALAZINE, HYDROmorphone, HYDROmorphone, insulin aspart, labetalol, magnesium sulfate IVPB, magnesium sulfate IVPB, ondansetron, potassium chloride **AND** potassium chloride **AND** potassium chloride, sodium chloride 0.9%    Review of Systems   Constitutional: Positive for activity change and appetite change. Negative for chills, fatigue and fever.   HENT: Negative for congestion and facial swelling.    Eyes: Negative for pain, discharge and visual disturbance.   Respiratory: Negative for cough, chest tightness, shortness of breath and wheezing.    Cardiovascular: Negative for chest pain, palpitations and leg swelling.   Gastrointestinal: Positive for constipation. Negative for abdominal distention, abdominal pain, diarrhea, nausea and vomiting.   Endocrine: Negative.    Genitourinary: Positive for decreased urine volume. Negative for difficulty urinating and hematuria.   Musculoskeletal: Negative for back pain.   Skin: Positive for wound. Negative for pallor and rash.   Allergic/Immunologic: Positive for immunocompromised state.   Neurological: Negative for dizziness, tremors, weakness and light-headedness.   Hematological: Negative.    Psychiatric/Behavioral: Negative for agitation, confusion and dysphoric mood. The patient is not nervous/anxious.      Objective:     Vital Signs (Most Recent):  Temp: 97.6 °F (36.4 °C) (12/30/17 0815)  Pulse: 80 (12/30/17 0815)  Resp: 16 (12/30/17 0815)  BP: 118/67 (12/30/17 0815)  SpO2: 99 % (12/30/17 0815) Vital Signs (24h Range):  Temp:  [97.5 °F (36.4 °C)-98.5 °F (36.9 °C)] 97.6 °F (36.4 °C)  Pulse:  [77-88] 80  Resp:   [16-23] 16  SpO2:  [96 %-100 %] 99 %  BP: (109-127)/(62-75) 118/67  Arterial Line BP: (113)/(56) 113/56     Weight: 71.5 kg (157 lb 10.1 oz)  Body mass index is 26.23 kg/m².    Intake/Output - Last 3 Shifts       12/28 0700 - 12/29 0659 12/29 0700 - 12/30 0659 12/30 0700 - 12/31 0659    P.O. 240 960     I.V. (mL/kg) 1988 (28) 2033 (28.4)     Blood       Total Intake(mL/kg) 2228 (31.4) 2993 (41.9)     Urine (mL/kg/hr) 76 (0) 285 (0.2) 125 (0.4)    Drains 95 (0.1) 5 (0)     Other 2447 (1.4) 484 (0.3)     Total Output 2618 774 125    Net -390 +2219 -125                 Physical Exam   Constitutional: She is oriented to person, place, and time. She appears well-developed and well-nourished.   HENT:   Head: Normocephalic and atraumatic.   Eyes: EOM are normal. Pupils are equal, round, and reactive to light. No scleral icterus.   Neck: Normal range of motion. Neck supple. No JVD present.   Cardiovascular: Normal rate, regular rhythm and normal heart sounds.    No murmur heard.  Pulmonary/Chest: Effort normal and breath sounds normal. No respiratory distress. She has no wheezes.   Abdominal: Soft. Bowel sounds are normal. She exhibits no distension.   Chevron inc with staples intact no s/s/i   Genitourinary:   Genitourinary Comments: Rivera to gravity, clear yellow urine   Musculoskeletal: Normal range of motion. She exhibits no edema.   Neurological: She is alert and oriented to person, place, and time.   Skin: Skin is warm and dry.   Psychiatric: She has a normal mood and affect. Her behavior is normal.   Nursing note and vitals reviewed.      Laboratory:  Immunosuppressants         Stop Route Frequency     mycophenolate capsule 1,000 mg      -- Oral 2 times daily     tacrolimus capsule 1 mg      -- Oral 2 times daily        CBC:   Recent Labs  Lab 12/30/17  0430   WBC 8.25   RBC 2.80*   HGB 8.6*   HCT 25.3*   PLT 59*   MCV 90   MCH 30.7   MCHC 34.0     CMP:   Recent Labs  Lab 12/30/17  0430   *  115*   CALCIUM  7.7*  7.7*   ALBUMIN 3.0*  3.0*   PROT 5.0*   *  135*   K 4.3  4.3   CO2 27  27     100   BUN 24*  24*   CREATININE 1.9*  1.9*   ALKPHOS 114   *   AST 46*   BILITOT 2.9*     Labs within the past 24 hours have been reviewed.    Diagnostic Results:  I have personally reviewed all pertinent imaging studies.    Assessment/Plan:     * Liver transplant 12/26/2017 for ETOH    - POD 1 US with elevated RIs of 1 and two perihepatic fluid collections.  - repeat on POD 6.  - LFTs improving.  - appetite fair but tolerating small meals. Prealbumin ordered in AM. No c/o pain.  - no BM yet. Stool softeners ordered.           DEREK (acute kidney injury)    - Cr 1.9.   - nephrology following.  - CRRT started 12/28.  - monitor daily to assess need for CRRT.           Long-term use of immunosuppressant medication    - continue prograf, cellcept and prednisone. Monitor labs daily and adjust dose accordingly for a therapeutic level.           Prophylactic immunotherapy    - see long term IS.           At risk for opportunistic infections    - continue valcyte and bactrim for cmv/pcp prophylaxis.           Anemia of chronic disease     - H&H stable.           Essential hypertension    - systolic bp 100-110s. Dc nifedipine.           Enterococcus UTI    - (+) Urine cx on 12/22 for enterococcus.  - repeat urine cx with no growth.  - treat with amoxil 500 mg q24h starting today 12/30.   - dc stevens catheter.             VTE Risk Mitigation         Ordered     heparin (porcine) injection 5,000 Units  Every 8 hours     Route:  Subcutaneous        12/26/17 1425     Place sequential compression device  Until discontinued      12/26/17 1425     High Risk of VTE  Once      12/26/17 1425     Place FABIOLA hose  Until discontinued      11/23/17 2305          The patients clinical status was discussed at multidisplinary rounds, involving transplant surgery, transplant medicine, pharmacy, nursing, nutrition, and social  work    Discharge Planning: not a candidate for dc at this time.       Radha Arellano NP  Liver Transplant  [facility]

## 2017-12-30 NOTE — PLAN OF CARE
Problem: Patient Care Overview  Goal: Plan of Care Review  Patient POD #4 liver transplant.  Patient is AAOx4 but delayed and inappropriate at times.  BA activated due to confusion.  Patients son was at the bedside this am, attentive to patient involved in patients care, went over self medication with patients son, painted patients chevron with betadine.  Patient has minimal UO, CVP 9 today.  No CRRT today.  Will reassess for CRRT tomorrow.  Patient denies pain.  T bili 2.9 today.  Rivera dcd this am, patient bladder scanned but has ascites, I/O cath only produced 40ccs of urine. BG controlled today.

## 2017-12-30 NOTE — ASSESSMENT & PLAN NOTE
- (+) Urine cx on 12/22 for enterococcus.  - repeat urine cx with no growth.  - treat with amoxil 500 mg q24h starting today 12/30.   - dc stevens catheter.

## 2017-12-30 NOTE — ASSESSMENT & PLAN NOTE
BG goal 140-180.     Currently at or below goal glucose with no insulin requirements.    Stop scheduled prandial insulin.  POC glucose AC, hs  Low dose correction

## 2017-12-30 NOTE — ASSESSMENT & PLAN NOTE
- Cr 1.9.   - nephrology following.  - CRRT started 12/28.  - monitor daily to assess need for CRRT.

## 2017-12-30 NOTE — PLAN OF CARE
Problem: Patient Care Overview  Goal: Plan of Care Review  Outcome: Ongoing (interventions implemented as appropriate)  Patient resting comfortably in bed, while awake patient with flat affect and delayed responses. Bed in low/locked position, upper side rails raised x2, call light within reach and son at bedside. Bed alarm in use. Bedside commode ordered. Pt is afebrile at this time. Proper hand hygiene performed before and after pt care activities. Chevron incision DU with staples, free from SSI. Denies any pain or discomfort at this time.

## 2017-12-30 NOTE — ASSESSMENT & PLAN NOTE
- POD 1 US with elevated RIs of 1 and two perihepatic fluid collections.  - repeat on POD 6.  - LFTs improving.  - appetite fair but tolerating small meals. Prealbumin ordered in AM. No c/o pain.  - no BM yet. Stool softeners ordered.

## 2017-12-30 NOTE — ASSESSMENT & PLAN NOTE
- continue prograf, cellcept and prednisone. Monitor labs daily and adjust dose accordingly for a therapeutic level.

## 2017-12-30 NOTE — SUBJECTIVE & OBJECTIVE
Scheduled Meds:   amoxicillin  500 mg Oral Daily    docusate sodium  100 mg Oral TID    famotidine  20 mg Oral QHS    heparin (porcine)  5,000 Units Subcutaneous Q8H    methylPREDNISolone sodium succinate  40 mg Intravenous Q12H    Followed by    [START ON 12/31/2017] methylPREDNISolone sodium succinate  20 mg Intravenous Q12H    Followed by    [START ON 1/1/2018] predniSONE  20 mg Oral Daily    mupirocin  1 g Nasal BID    mycophenolate  1,000 mg Oral BID    pantoprazole  40 mg Oral Daily    posaconazole  300 mg Oral Daily    QUEtiapine  25 mg Oral QHS    [START ON 1/1/2018] sulfamethoxazole-trimethoprim 400-80mg  1 tablet Oral Every Mon, Wed, Fri    tacrolimus  1 mg Oral BID    [START ON 1/1/2018] valGANciclovir  450 mg Oral Every Mon, Wed, Fri     Continuous Infusions:  PRN Meds:calcium gluconate IVPB, calcium gluconate IVPB, calcium gluconate IVPB, dextrose 50%, dextrose 50%, glucagon (human recombinant), glucose, glucose, hydrALAZINE, HYDROmorphone, HYDROmorphone, insulin aspart, labetalol, magnesium sulfate IVPB, magnesium sulfate IVPB, ondansetron, potassium chloride **AND** potassium chloride **AND** potassium chloride, sodium chloride 0.9%    Review of Systems   Constitutional: Positive for activity change and appetite change. Negative for chills, fatigue and fever.   HENT: Negative for congestion and facial swelling.    Eyes: Negative for pain, discharge and visual disturbance.   Respiratory: Negative for cough, chest tightness, shortness of breath and wheezing.    Cardiovascular: Negative for chest pain, palpitations and leg swelling.   Gastrointestinal: Positive for constipation. Negative for abdominal distention, abdominal pain, diarrhea, nausea and vomiting.   Endocrine: Negative.    Genitourinary: Positive for decreased urine volume. Negative for difficulty urinating and hematuria.   Musculoskeletal: Negative for back pain.   Skin: Positive for wound. Negative for pallor and rash.    Allergic/Immunologic: Positive for immunocompromised state.   Neurological: Negative for dizziness, tremors, weakness and light-headedness.   Hematological: Negative.    Psychiatric/Behavioral: Negative for agitation, confusion and dysphoric mood. The patient is not nervous/anxious.      Objective:     Vital Signs (Most Recent):  Temp: 97.6 °F (36.4 °C) (12/30/17 0815)  Pulse: 80 (12/30/17 0815)  Resp: 16 (12/30/17 0815)  BP: 118/67 (12/30/17 0815)  SpO2: 99 % (12/30/17 0815) Vital Signs (24h Range):  Temp:  [97.5 °F (36.4 °C)-98.5 °F (36.9 °C)] 97.6 °F (36.4 °C)  Pulse:  [77-88] 80  Resp:  [16-23] 16  SpO2:  [96 %-100 %] 99 %  BP: (109-127)/(62-75) 118/67  Arterial Line BP: (113)/(56) 113/56     Weight: 71.5 kg (157 lb 10.1 oz)  Body mass index is 26.23 kg/m².    Intake/Output - Last 3 Shifts       12/28 0700 - 12/29 0659 12/29 0700 - 12/30 0659 12/30 0700 - 12/31 0659    P.O. 240 960     I.V. (mL/kg) 1988 (28) 2033 (28.4)     Blood       Total Intake(mL/kg) 2228 (31.4) 2993 (41.9)     Urine (mL/kg/hr) 76 (0) 285 (0.2) 125 (0.4)    Drains 95 (0.1) 5 (0)     Other 2447 (1.4) 484 (0.3)     Total Output 2618 774 125    Net -390 +2219 -125                 Physical Exam   Constitutional: She is oriented to person, place, and time. She appears well-developed and well-nourished.   HENT:   Head: Normocephalic and atraumatic.   Eyes: EOM are normal. Pupils are equal, round, and reactive to light. No scleral icterus.   Neck: Normal range of motion. Neck supple. No JVD present.   Cardiovascular: Normal rate, regular rhythm and normal heart sounds.    No murmur heard.  Pulmonary/Chest: Effort normal and breath sounds normal. No respiratory distress. She has no wheezes.   Abdominal: Soft. Bowel sounds are normal. She exhibits no distension.   Chevron inc with staples intact no s/s/i   Genitourinary:   Genitourinary Comments: Rivera to gravity, clear yellow urine   Musculoskeletal: Normal range of motion. She exhibits no edema.    Neurological: She is alert and oriented to person, place, and time.   Skin: Skin is warm and dry.   Psychiatric: She has a normal mood and affect. Her behavior is normal.   Nursing note and vitals reviewed.      Laboratory:  Immunosuppressants         Stop Route Frequency     mycophenolate capsule 1,000 mg      -- Oral 2 times daily     tacrolimus capsule 1 mg      -- Oral 2 times daily        CBC:   Recent Labs  Lab 12/30/17  0430   WBC 8.25   RBC 2.80*   HGB 8.6*   HCT 25.3*   PLT 59*   MCV 90   MCH 30.7   MCHC 34.0     CMP:   Recent Labs  Lab 12/30/17  0430   *  115*   CALCIUM 7.7*  7.7*   ALBUMIN 3.0*  3.0*   PROT 5.0*   *  135*   K 4.3  4.3   CO2 27  27     100   BUN 24*  24*   CREATININE 1.9*  1.9*   ALKPHOS 114   *   AST 46*   BILITOT 2.9*     Labs within the past 24 hours have been reviewed.    Diagnostic Results:  I have personally reviewed all pertinent imaging studies.

## 2017-12-31 LAB
ALBUMIN SERPL BCP-MCNC: 2.8 G/DL
ALP SERPL-CCNC: 102 U/L
ALT SERPL W/O P-5'-P-CCNC: 87 U/L
ANION GAP SERPL CALC-SCNC: 8 MMOL/L
AST SERPL-CCNC: 38 U/L
BASOPHILS # BLD AUTO: 0.01 K/UL
BASOPHILS NFR BLD: 0.1 %
BILIRUB SERPL-MCNC: 2.5 MG/DL
BUN SERPL-MCNC: 32 MG/DL
C DIFF GDH STL QL: NEGATIVE
C DIFF TOX A+B STL QL IA: NEGATIVE
CALCIUM SERPL-MCNC: 8 MG/DL
CHLORIDE SERPL-SCNC: 100 MMOL/L
CO2 SERPL-SCNC: 26 MMOL/L
CREAT SERPL-MCNC: 1.8 MG/DL
DIFFERENTIAL METHOD: ABNORMAL
EOSINOPHIL # BLD AUTO: 0 K/UL
EOSINOPHIL NFR BLD: 0.1 %
ERYTHROCYTE [DISTWIDTH] IN BLOOD BY AUTOMATED COUNT: 15.9 %
EST. GFR  (AFRICAN AMERICAN): 36.3 ML/MIN/1.73 M^2
EST. GFR  (NON AFRICAN AMERICAN): 31.5 ML/MIN/1.73 M^2
GLUCOSE SERPL-MCNC: 117 MG/DL
HCT VFR BLD AUTO: 24.5 %
HGB BLD-MCNC: 8.4 G/DL
IMM GRANULOCYTES # BLD AUTO: 0.11 K/UL
IMM GRANULOCYTES NFR BLD AUTO: 1.3 %
INR PPP: 1.2
LYMPHOCYTES # BLD AUTO: 1.3 K/UL
LYMPHOCYTES NFR BLD: 15.2 %
MAGNESIUM SERPL-MCNC: 2.3 MG/DL
MCH RBC QN AUTO: 31 PG
MCHC RBC AUTO-ENTMCNC: 34.3 G/DL
MCV RBC AUTO: 90 FL
MONOCYTES # BLD AUTO: 0.6 K/UL
MONOCYTES NFR BLD: 7.7 %
NEUTROPHILS # BLD AUTO: 6.3 K/UL
NEUTROPHILS NFR BLD: 75.6 %
NRBC BLD-RTO: 2 /100 WBC
PHOSPHATE SERPL-MCNC: 4.4 MG/DL
PLATELET # BLD AUTO: 50 K/UL
PMV BLD AUTO: 11.3 FL
POCT GLUCOSE: 117 MG/DL (ref 70–110)
POTASSIUM SERPL-SCNC: 3.8 MMOL/L
PREALB SERPL-MCNC: 25 MG/DL
PROT SERPL-MCNC: 4.8 G/DL
PROTHROMBIN TIME: 12.2 SEC
RBC # BLD AUTO: 2.71 M/UL
SODIUM SERPL-SCNC: 134 MMOL/L
TACROLIMUS BLD-MCNC: 5.8 NG/ML
WBC # BLD AUTO: 8.35 K/UL

## 2017-12-31 PROCEDURE — 80053 COMPREHEN METABOLIC PANEL: CPT

## 2017-12-31 PROCEDURE — 99233 SBSQ HOSP IP/OBS HIGH 50: CPT | Mod: 24,,, | Performed by: NURSE PRACTITIONER

## 2017-12-31 PROCEDURE — 63600175 PHARM REV CODE 636 W HCPCS: Performed by: NURSE PRACTITIONER

## 2017-12-31 PROCEDURE — 84100 ASSAY OF PHOSPHORUS: CPT

## 2017-12-31 PROCEDURE — 83735 ASSAY OF MAGNESIUM: CPT

## 2017-12-31 PROCEDURE — 80197 ASSAY OF TACROLIMUS: CPT

## 2017-12-31 PROCEDURE — 85610 PROTHROMBIN TIME: CPT

## 2017-12-31 PROCEDURE — 84134 ASSAY OF PREALBUMIN: CPT

## 2017-12-31 PROCEDURE — 25000003 PHARM REV CODE 250: Performed by: NURSE PRACTITIONER

## 2017-12-31 PROCEDURE — 85025 COMPLETE CBC W/AUTO DIFF WBC: CPT

## 2017-12-31 PROCEDURE — 63600175 PHARM REV CODE 636 W HCPCS: Performed by: STUDENT IN AN ORGANIZED HEALTH CARE EDUCATION/TRAINING PROGRAM

## 2017-12-31 PROCEDURE — 25000003 PHARM REV CODE 250: Performed by: STUDENT IN AN ORGANIZED HEALTH CARE EDUCATION/TRAINING PROGRAM

## 2017-12-31 PROCEDURE — 63600175 PHARM REV CODE 636 W HCPCS: Performed by: TRANSPLANT SURGERY

## 2017-12-31 PROCEDURE — 20600001 HC STEP DOWN PRIVATE ROOM

## 2017-12-31 PROCEDURE — 87449 NOS EACH ORGANISM AG IA: CPT

## 2017-12-31 RX ORDER — TACROLIMUS 1 MG/1
2 CAPSULE ORAL 2 TIMES DAILY
Status: DISCONTINUED | OUTPATIENT
Start: 2017-12-31 | End: 2018-01-02

## 2017-12-31 RX ORDER — FUROSEMIDE 10 MG/ML
80 INJECTION INTRAMUSCULAR; INTRAVENOUS ONCE
Status: COMPLETED | OUTPATIENT
Start: 2017-12-31 | End: 2017-12-31

## 2017-12-31 RX ORDER — TACROLIMUS 1 MG/1
1 CAPSULE ORAL ONCE
Status: COMPLETED | OUTPATIENT
Start: 2017-12-31 | End: 2017-12-31

## 2017-12-31 RX ADMIN — POSACONAZOLE 300 MG: 100 TABLET, COATED ORAL at 08:12

## 2017-12-31 RX ADMIN — MUPIROCIN 1 G: 20 OINTMENT TOPICAL at 09:12

## 2017-12-31 RX ADMIN — HEPARIN SODIUM 5000 UNITS: 5000 INJECTION, SOLUTION INTRAVENOUS; SUBCUTANEOUS at 08:12

## 2017-12-31 RX ADMIN — MYCOPHENOLATE MOFETIL 1000 MG: 250 CAPSULE ORAL at 08:12

## 2017-12-31 RX ADMIN — METHYLPREDNISOLONE SODIUM SUCCINATE 20 MG: 40 INJECTION, POWDER, FOR SOLUTION INTRAMUSCULAR; INTRAVENOUS at 08:12

## 2017-12-31 RX ADMIN — HEPARIN SODIUM 5000 UNITS: 5000 INJECTION, SOLUTION INTRAVENOUS; SUBCUTANEOUS at 05:12

## 2017-12-31 RX ADMIN — MINERAL OIL, PETROLATUM, PHENYLEPHRINE HCL 1 APPLICATOR: 2.5; 140; 749 OINTMENT TOPICAL at 05:12

## 2017-12-31 RX ADMIN — AMOXICILLIN 500 MG: 500 CAPSULE ORAL at 08:12

## 2017-12-31 RX ADMIN — FUROSEMIDE 80 MG: 10 INJECTION, SOLUTION INTRAMUSCULAR; INTRAVENOUS at 08:12

## 2017-12-31 RX ADMIN — TACROLIMUS 2 MG: 1 CAPSULE ORAL at 05:12

## 2017-12-31 RX ADMIN — TACROLIMUS 1 MG: 1 CAPSULE ORAL at 08:12

## 2017-12-31 RX ADMIN — FAMOTIDINE 20 MG: 20 TABLET, FILM COATED ORAL at 08:12

## 2017-12-31 RX ADMIN — PANTOPRAZOLE SODIUM 40 MG: 40 TABLET, DELAYED RELEASE ORAL at 08:12

## 2017-12-31 RX ADMIN — HEPARIN SODIUM 5000 UNITS: 5000 INJECTION, SOLUTION INTRAVENOUS; SUBCUTANEOUS at 02:12

## 2017-12-31 RX ADMIN — QUETIAPINE FUMARATE 25 MG: 25 TABLET, FILM COATED ORAL at 08:12

## 2017-12-31 NOTE — ASSESSMENT & PLAN NOTE
Marcie Bazan is a 52 year old female with pass history of liver cirrhosis secondary to ETOH, who recently had liver transplanat on 12/26/17, which afterward presented with DEREK that mostly secondary to ischemic ATN after surgery, serum creatinine has been worsening and became anuric.     Plan:   - Serum creatinine baseline 0.7-0.9  -Today CVP= 9  - Will Hold on SLED today and reassess tomorrow if require dialysis, if patient present acidosis, any sign of volume overload or electrolyte abnormality will start again on SLED.  - Urine sediment presented with few RBCs (no dysmorphic cells), no casts   - Renal ultrasound with decrease perfusion and increase renal cortical echogenicity suggesting chronic renal disease.  No evidence of hydronephrosis, masses or nephrolysiasis   -Avoid nephrotoxic medications and renal dose medications  - Continue with intake and output

## 2017-12-31 NOTE — ASSESSMENT & PLAN NOTE
- (+) Urine cx on 12/22 for enterococcus.  - repeat urine cx with no growth.  - treat with amoxil 500 mg q24h starting 12/30.   - dc stevens catheter 12/30.

## 2017-12-31 NOTE — NURSING
"Notified by PCT that patient has "a lot of blood coming from her incision" while sitting up during her bath.  On nursing assessment, patient with a moderate amount of bloody drainage freely flowing from right 1/3 of chevron incision.  With palpation, blood flow increased.  Blood flow ceased temporarily with pressure, so pressure dressing of gauze & tape applied.  Patient instructed to call if further drainage noted.  Notified ADRIANO Arellano NP who will evaluate on next rounds.  "

## 2017-12-31 NOTE — PROGRESS NOTES
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 Patient presented to [facility] with generalized weakness. Onset two months ago, progressively worsening. Was admitted to  11/10 for hyponatremia seen on labs in GI office. Discharged on 11/15 and returned on 11/20 with 1 week worsening weakness, malaise, lethargy, and decreased appetite. Denied fever/chills, abdominal pain, bright red or black tarry stools, hematemesis or other abnormal bleeding, confusion or disorientation, changes in urination, light-headedness, headache, changes in vision, dyspnea, chest pain or palpitations. Labs demonstrated Na 116 and MELD 32; patient admitted for hyponatremia. Nephrology consulted, recommended fluid restriction , holding diuretics, and prednisode taper that started 11/23 (decreased from 40mg QD to 20mg Qd.) Patient transferred to [facility] for revaluation of liver disease. Of note, paracentesis performed 11/22, removed 2L, negative for SBP. Liver biopsy 10/24 demonstrated stage 4 cirrhosis. Records reports EGD in August 2016 that showed small esophageal varices with severe gastritis. Patient accepted for Liver transplant. Had liver transplant on 12/26/17. No evident complications during surgery. Was making good amount of urine. On next day presented with DEREK were serum creatinine increased from 1.2 -->1.6 and urinary output started to decreased. Today sCr continued to trend upward to 2.1, serum bicarbonate decreasing to 15, anuric and  chest x ray present moderate edema. This is when nephrology was consulted.     Interval History:   Patient evaluated at bedside with family members, no events overnight. Patient still not making much urine today. Total intake 960 cc and urinary output 285 cc. CVP=9    Review of patient's allergies indicates:   Allergen Reactions    Ferrous sulfate Other (See Comments)     Patient states the pill makes her sick. She stated she would rather have a shot     Current Facility-Administered Medications   Medication Frequency    amoxicillin capsule 500 mg Daily    calcium gluconate 1 g in dextrose 5 % 100 mL IVPB (premix) PRN    calcium gluconate 1 g in dextrose 5 % 100 mL IVPB (premix) PRN    calcium gluconate 1 g in dextrose 5 % 100 mL IVPB (premix) PRN    dextrose 50% injection 12.5 g PRN    dextrose 50% injection 25 g PRN    docusate sodium capsule 100 mg TID    famotidine tablet 20 mg QHS    glucagon (human recombinant) injection 1 mg PRN    glucose chewable tablet 16 g PRN    glucose chewable tablet 24 g PRN    heparin (porcine) injection 5,000 Units Q8H    hydrALAZINE injection 10 mg Q6H PRN    HYDROmorphone tablet 2 mg Q3H PRN    HYDROmorphone tablet 4 mg Q3H PRN    insulin aspart pen 0-5 Units QID (AC + HS) PRN    labetalol injection 10 mg Q4H PRN    magnesium sulfate 2g in water 50mL IVPB (premix) PRN    magnesium sulfate 2g in water 50mL IVPB (premix) PRN    [START ON 12/31/2017] methylPREDNISolone sodium succinate injection 20 mg Q12H    Followed by    [START ON 1/1/2018] predniSONE tablet 20 mg Daily    mupirocin 2 % ointment 1 g BID    mycophenolate capsule 1,000 mg BID    ondansetron disintegrating tablet 4 mg Q8H PRN    pantoprazole EC tablet 40 mg Daily    posaconazole tablet 300 mg Daily    potassium chloride 40 mEq in 100 mL IVPB (FOR CENTRAL LINE ADMINISTRATION ONLY) PRN    And    potassium chloride 20 mEq in 100 mL IVPB (FOR CENTRAL LINE ADMINISTRATION ONLY) PRN    And     potassium chloride 40 mEq in 100 mL IVPB (FOR CENTRAL LINE ADMINISTRATION ONLY) PRN    QUEtiapine tablet 25 mg QHS    sodium chloride 0.9% flush 3 mL PRN    [START ON 1/1/2018] sulfamethoxazole-trimethoprim 400-80mg per tablet 1 tablet Every Mon, Wed, Fri    tacrolimus capsule 1 mg BID    [START ON 1/1/2018] valGANciclovir tablet 450 mg Every Mon, Wed, Fri       Objective:     Vital Signs (Most Recent):  Temp: 98.7 °F (37.1 °C) (12/30/17 2000)  Pulse: 76 (12/30/17 2000)  Resp: 18 (12/30/17 2000)  BP: 120/70 (12/30/17 2000)  SpO2: 98 % (12/30/17 2000)  O2 Device (Oxygen Therapy): room air (12/30/17 2000) Vital Signs (24h Range):  Temp:  [97.5 °F (36.4 °C)-98.7 °F (37.1 °C)] 98.7 °F (37.1 °C)  Pulse:  [76-83] 76  Resp:  [16-18] 18  SpO2:  [96 %-99 %] 98 %  BP: (113-121)/(62-71) 120/70     Weight: 71.5 kg (157 lb 10.1 oz) (12/30/17 0500)  Body mass index is 26.23 kg/m².  Body surface area is 1.81 meters squared.    I/O last 3 completed shifts:  In: 1460 [P.O.:1460]  Out: 531 [Urine:530; Stool:1]    Physical Exam   Constitutional: She is oriented to person, place, and time. She appears well-developed and well-nourished. No distress.   HENT:   Head: Normocephalic and atraumatic.   Eyes: Scleral icterus is present.   Neck: Normal range of motion. Neck supple.   Cardiovascular: Normal rate.    No murmur heard.  Pulmonary/Chest: Effort normal. She has decreased breath sounds in the right lower field and the left lower field.   Abdominal: Soft. Bowel sounds are normal. She exhibits distension.   Musculoskeletal: Normal range of motion. She exhibits no edema or tenderness.   Neurological: She is alert and oriented to person, place, and time.   Skin: Skin is warm. She is not diaphoretic.   Vitals reviewed.      Significant Labs:  ABGs:   Recent Labs  Lab 12/28/17  0854   PH 7.459*   PCO2 22.6*   HCO3 16.0*   POCSATURATED 97   BE -8     BMP:   Recent Labs  Lab 12/30/17  0430   *  115*     100   CO2 27  27    BUN 24*  24*   CREATININE 1.9*  1.9*   CALCIUM 7.7*  7.7*   MG 2.5     CBC:   Recent Labs  Lab 12/30/17  0430   WBC 8.25   RBC 2.80*   HGB 8.6*   HCT 25.3*   PLT 59*   MCV 90   MCH 30.7   MCHC 34.0     CMP:   Recent Labs  Lab 12/30/17  0430   *  115*   CALCIUM 7.7*  7.7*   ALBUMIN 3.0*  3.0*   PROT 5.0*   *  135*   K 4.3  4.3   CO2 27  27     100   BUN 24*  24*   CREATININE 1.9*  1.9*   ALKPHOS 114   *   AST 46*   BILITOT 2.9*     All labs within the past 24 hours have been reviewed.     Assessment/Plan:     DEREK (acute kidney injury)     Marcie Bazan is a 52 year old female with pass history of liver cirrhosis secondary to ETOH, who recently had liver transplanat on 12/26/17, which afterward presented with DEREK that mostly secondary to ischemic ATN after surgery, serum creatinine has been worsening and became anuric.     Plan:   - Serum creatinine baseline 0.7-0.9  -Today CVP= 9  - Will Hold on SLED today and reassess tomorrow if require dialysis, if patient present acidosis, any sign of volume overload or electrolyte abnormality will start again on SLED.  - Urine sediment presented with few RBCs (no dysmorphic cells), no casts   - Renal ultrasound with decrease perfusion and increase renal cortical echogenicity suggesting chronic renal disease.  No evidence of hydronephrosis, masses or nephrolysiasis   -Avoid nephrotoxic medications and renal dose medications  - Continue with intake and output           Vito Capellan  Nephrology  Fellow  [facility]    Pager [phone]

## 2017-12-31 NOTE — PROGRESS NOTES
[facility]  Liver Transplant  Progress Note    Patient Name: Marcie Bazan  MRN: [id number]  Admission Date: 2017  Hospital Length of Stay: 38 days  Code Status: Full Code  Primary Care Provider: ODILIA Wall  Post-Operative Day: 5    ORGAN:   LIVER  Disease Etiology: Alcoholic Cirrhosis  Donor Type:    - Brain Death  CDC High Risk:   No  Donor CMV Status:   Donor CMV Status: Negative  Donor HBcAB:   Negative  Donor HCV Status:   Negative  Whole or Partial: Whole Liver  Biliary Anastomosis: End to End  Arterial Anatomy: Standard  Subjective:     History of Present Illness:  HPI:  52 year old female with PMHx of 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 Patient presented to [facility] with generalized weakness. Onset two months ago, progressively worsening. Was admitted to  11/10 for hyponatremia seen on labs in GI office. Discharged on 11/15 and returned on  with 1 week worsening weakness, malaise, lethargy, and decreased appetite. Denied fever/chills, abdominal pain, bright red or black tarry stools, hematemesis or other abnormal bleeding, confusion or disorientation, changes in urination, light-headedness, headache, changes in vision, dyspnea, chest pain or palpitations. Labs demonstrated Na 116 and MELD 32; patient admitted for hyponatremia. Nephrology consulted, recommended fluid restriction , holding diuretics, and prednisode taper that started  (decreased from 40mg QD to 20mg Qd.) Patient transferred to [facility] for revaluation of liver disease. Of note, paracentesis performed , removed 2L, negative for SBP.     Follows with Dr. Souza for cirrhosis. Since early 2017, patient's liver failure progressing indicated by worsening clinical signs (abdominal distention, weakness/fatigue) and MELD. In  mid-October, patient's PETH demonstrated EtOH use and she was denied candidacy for liver transplant. She was then enrolled in in-patient substance abuse program at [facility] where she has remained since early November; per patient, her last EtOH beverage was 11/01/2017. Denies history of GI bleed, SBP, renal disease, or other complications of cirrhosis. Liver biopsy 10/24 demonstrated stage 4 cirrhosis. Records reports EGD in August 2016 that showed small esophageal varices with severe gastritis. Colonoscopy 2015 with 2 polyps and internal hemorrhoids Started on prednisone 40mg QD on 10/25; was on spironolactone, has been held since 11/10 for hypoNa; on lactulose.    Ms. Bazan is now s/p OLTX 12/26/17 for ETOH cirrhosis and PERKINS (steroid induction, DBD, CMV D-/R+). Post op course with confusion and disorientation. Mental status improving. Currently she is taking seroquel night. Nephrology following. CRRT started 12/28.     Hospital Course:  Mental status improving with hepatic encephalopathy treatment. Started empiric ceftriaxone given leukocytosis in immunocompromised state. CMV PCR positive, for which ID was consulted, recommended repeating titers in 1 week. Seen by Psychiatry and  who determined she was moderate risk for Liver transplant. EGD/Cscope and mammogram ordered. Colonoscopy not performed due to poor prep, EGD revealed large varices which were banded. IR paracentesis ordered after patient started complaining of abdominal pain. Mammogram performed, IR paracentesis after INR Reversal negative for SBP.  As pt with low risk malignancy, it was decided not to proceed with a breast biopsy.  Patient accepted for Liver transplant. Listed with MELD 35. Patient was slightly confused and altered, which resolved with lactulose enemas.     Liver transplant 12/27/2017 (DBD, EBV D+/R+, CMV D-/R+, donor normal anatomy)     POD# 0 - Slow to arouse from sedation, not following commands, minimal vent setting  however unable to extubate due to mental status. Poor UOP.     POD#1 - Extubated, pt more alert and able to follow commands however confused and disoriented. Poor UOP 10-15 cc/hr. LFTs trending down , normalized INR, Liver US within normal limits. Thrombocytopenia, 1 pack plts given with line removal, transducer in RIJ  Developed hematoma, required holding pressure for 1.5hrs and another pack of plts.     POD#2 - Improve mental status AAO to person and place, not situation or time. Continues to have minimal UOP not responsive to lasix, Nephrology consulted, started CRRT. LFTs continue to trend down, INR normalized, plts 136     POD# 3 - UOP remaines 5cc/hr, tolerated CRRT for 10 hours, 2.5L UF. Mental status continues to clear up. AF, HDS, tolerating diet, OOB, pain controlled.    Interval history: no acute events overnight. Pt alert and awake but with continued slowed mentation. Continue seroquel nightly. LFTs improving. POD 1 US showed elevated RIs of 1 and two perihepatic fluid collections. Will repeat US on 1/2/18. Pt with (+) enterococcus UTI prior to txp. Repeat with no growth. Continue amoxil 500 mg po q24h. Cr improving, 1.8 from 1.9. Nephrology following. No need for CRRT. Will trial lasix 80 mg IV x 1.appetite fair. Prealbumin 25. Supplements ordered. No complaints of pain. Monitor.     Scheduled Meds:   amoxicillin  500 mg Oral Daily    docusate sodium  100 mg Oral TID    famotidine  20 mg Oral QHS    heparin (porcine)  5,000 Units Subcutaneous Q8H    methylPREDNISolone sodium succinate  20 mg Intravenous Q12H    Followed by    [START ON 1/1/2018] predniSONE  20 mg Oral Daily    mycophenolate  1,000 mg Oral BID    pantoprazole  40 mg Oral Daily    posaconazole  300 mg Oral Daily    QUEtiapine  25 mg Oral QHS    [START ON 1/1/2018] sulfamethoxazole-trimethoprim 400-80mg  1 tablet Oral Every Mon, Wed, Fri    tacrolimus  2 mg Oral BID    [START ON 1/1/2018] valGANciclovir  450 mg Oral Every Mon,  Wed, Fri     Continuous Infusions:  PRN Meds:hydrALAZINE, HYDROmorphone, HYDROmorphone, labetalol, ondansetron, sodium chloride 0.9%    Review of Systems   Constitutional: Positive for activity change, appetite change and fatigue. Negative for chills and fever.   HENT: Negative for congestion and facial swelling.    Eyes: Negative for pain, discharge and visual disturbance.   Respiratory: Negative for cough, chest tightness, shortness of breath and wheezing.    Cardiovascular: Negative for chest pain, palpitations and leg swelling.   Gastrointestinal: Positive for abdominal distention. Negative for abdominal pain, constipation, diarrhea, nausea and vomiting.   Endocrine: Negative.    Genitourinary: Positive for decreased urine volume. Negative for difficulty urinating and hematuria.   Musculoskeletal: Negative for back pain.   Skin: Positive for wound. Negative for pallor and rash.   Allergic/Immunologic: Positive for immunocompromised state.   Neurological: Negative for dizziness, tremors, weakness and light-headedness.   Hematological: Negative.    Psychiatric/Behavioral: Positive for confusion and dysphoric mood. Negative for agitation. The patient is not nervous/anxious.      Objective:     Vital Signs (Most Recent):  Temp: 97.9 °F (36.6 °C) (12/31/17 0720)  Pulse: 74 (12/31/17 0720)  Resp: 16 (12/31/17 0720)  BP: 122/72 (12/31/17 0720)  SpO2: 98 % (12/31/17 0720) Vital Signs (24h Range):  Temp:  [97.7 °F (36.5 °C)-98.7 °F (37.1 °C)] 97.9 °F (36.6 °C)  Pulse:  [74-78] 74  Resp:  [16-18] 16  SpO2:  [96 %-99 %] 98 %  BP: (116-122)/(68-73) 122/72     Weight: 70.5 kg (155 lb 6.8 oz)  Body mass index is 25.86 kg/m².    Intake/Output - Last 3 Shifts       12/29 0700 - 12/30 0659 12/30 0700 - 12/31 0659 12/31 0700 - 01/01 0659    P.O. 960 590 360    I.V. (mL/kg) 2033 (28.4)      Total Intake(mL/kg) 2993 (41.9) 590 (8.4) 360 (5.1)    Urine (mL/kg/hr) 285 (0.2) 245 (0.1)     Emesis/NG output  0 (0)     Drains 5 (0)       Other 484 (0.3) 0 (0)     Stool  2 (0)     Total Output 774 247      Net +2219 +343 +360           Urine Occurrence  2 x 1 x    Stool Occurrence  4 x 1 x    Emesis Occurrence  0 x           Physical Exam   Constitutional: She is oriented to person, place, and time. She appears well-developed and well-nourished.   HENT:   Head: Normocephalic and atraumatic.   Eyes: EOM are normal. Pupils are equal, round, and reactive to light. No scleral icterus.   Neck: Normal range of motion. Neck supple. No JVD present.   Cardiovascular: Normal rate, regular rhythm and normal heart sounds.    No murmur heard.  Pulmonary/Chest: Effort normal and breath sounds normal. No respiratory distress. She has no wheezes.   Abdominal: Soft. Bowel sounds are normal. She exhibits no distension.   Chevron inc with staples intact and minimal oozing noted. No s/s/i   Genitourinary:   Genitourinary Comments: Rivera to gravity, clear yellow urine   Musculoskeletal: Normal range of motion. She exhibits no edema.   Neurological: She is alert and oriented to person, place, and time.   Skin: Skin is warm and dry.   Psychiatric: She has a normal mood and affect. Her behavior is normal.   Nursing note and vitals reviewed.      Laboratory:  Immunosuppressants         Stop Route Frequency     tacrolimus capsule 2 mg      -- Oral 2 times daily     mycophenolate capsule 1,000 mg      -- Oral 2 times daily        CBC:   Recent Labs  Lab 12/31/17  0500   WBC 8.35   RBC 2.71*   HGB 8.4*   HCT 24.5*   PLT 50*   MCV 90   MCH 31.0   MCHC 34.3     CMP:   Recent Labs  Lab 12/31/17  0500   *   CALCIUM 8.0*   ALBUMIN 2.8*   PROT 4.8*   *   K 3.8   CO2 26      BUN 32*   CREATININE 1.8*   ALKPHOS 102   ALT 87*   AST 38   BILITOT 2.5*     Labs within the past 24 hours have been reviewed.    Diagnostic Results:  I have personally reviewed all pertinent imaging studies.    Assessment/Plan:     * Liver transplant 12/26/2017 for ETOH    - POD 1 US with  elevated RIs of 1 and two perihepatic fluid collections.  - repeat on POD 7 (ordered).  - LFTs improving.  - appetite fair. Prealbumin 25. Supplements ordered.  - No c/o pain.  - (+) BM.            DEREK (acute kidney injury)    - Cr 1.8 from 1.9.   - nephrology following.  - CRRT on 12/28.  - no need for RRT at this time.  - will trial lasix 80 mg IV x 1. Monitor.           Long-term use of immunosuppressant medication    - continue prograf, cellcept and prednisone. Monitor labs daily and adjust dose accordingly for a therapeutic level.           Prophylactic immunotherapy    - see long term IS.           At risk for opportunistic infections    - continue valcyte and bactrim for cmv/pcp prophylaxis.           Anemia of chronic disease     - H&H stable.           Essential hypertension    - systolic bp 110s-120s. Dc'd nifedipine 12/30. Monitor.           Enterococcus UTI    - (+) Urine cx on 12/22 for enterococcus.  - repeat urine cx with no growth.  - treat with amoxil 500 mg q24h starting 12/30.   - dc stevens catheter 12/30.             VTE Risk Mitigation         Ordered     heparin (porcine) injection 5,000 Units  Every 8 hours     Route:  Subcutaneous        12/26/17 1425     Place sequential compression device  Until discontinued      12/26/17 1425     High Risk of VTE  Once      12/26/17 1425     Place FABIOLA hose  Until discontinued      11/23/17 2305          The patients clinical status was discussed at multidisplinary rounds, involving transplant surgery, transplant medicine, pharmacy, nursing, nutrition, and social work    Discharge Planning: not a candidate for dc at this time.      Radha Arellano NP  Liver Transplant  [facility]

## 2017-12-31 NOTE — PLAN OF CARE
"Problem: Patient Care Overview  Goal: Plan of Care Review  Outcome: Ongoing (interventions implemented as appropriate)  - 5x BM so far this shift (per report, 3x overnight) with notable incontinence- starting special contact precautions & sent stool for C-diff  - Incontinent of urine, unable to call for assist in time to catch for urine measurement.  Tried female external catheter with no success due to frequent stools clogging purewick.  - Incision with moderate/large amount of bloody drainage, now from both sides.  Pressure dressing of ABD pad applied by NP to right, RN applied pressure dressing of ABD pad to left.  - No orders for CRRT or HD today  - Patient is oriented to person - intermittently oriented to place, time, & situation.  Reorient as indicated.  Blinds open, friend at bedside.  - Incontinence associated dermatitis noted with the frequent stools.  Barrier cream in use & frequent soilage checks.  - Renal diet with poor appetite.  Per her friend at bedside she (even in good health) historically had a poor appetite "eating like a bird."  Nepro supplement drink ordered & will encourage  - Keep bed alarm on, call light in reach, nonslip socks in use, call light at bedside but patient doesn't really use - frequent safety checks as friend is present but not proactive for soilage or safety.      "

## 2017-12-31 NOTE — SUBJECTIVE & OBJECTIVE
Interval History:   Patient evaluated at bedside with family members, no events overnight. Patient still not making much urine today. Total intake 960 cc and urinary output 285 cc. CVP=9    Review of patient's allergies indicates:   Allergen Reactions    Ferrous sulfate Other (See Comments)     Patient states the pill makes her sick. She stated she would rather have a shot     Current Facility-Administered Medications   Medication Frequency    amoxicillin capsule 500 mg Daily    calcium gluconate 1 g in dextrose 5 % 100 mL IVPB (premix) PRN    calcium gluconate 1 g in dextrose 5 % 100 mL IVPB (premix) PRN    calcium gluconate 1 g in dextrose 5 % 100 mL IVPB (premix) PRN    dextrose 50% injection 12.5 g PRN    dextrose 50% injection 25 g PRN    docusate sodium capsule 100 mg TID    famotidine tablet 20 mg QHS    glucagon (human recombinant) injection 1 mg PRN    glucose chewable tablet 16 g PRN    glucose chewable tablet 24 g PRN    heparin (porcine) injection 5,000 Units Q8H    hydrALAZINE injection 10 mg Q6H PRN    HYDROmorphone tablet 2 mg Q3H PRN    HYDROmorphone tablet 4 mg Q3H PRN    insulin aspart pen 0-5 Units QID (AC + HS) PRN    labetalol injection 10 mg Q4H PRN    magnesium sulfate 2g in water 50mL IVPB (premix) PRN    magnesium sulfate 2g in water 50mL IVPB (premix) PRN    [START ON 12/31/2017] methylPREDNISolone sodium succinate injection 20 mg Q12H    Followed by    [START ON 1/1/2018] predniSONE tablet 20 mg Daily    mupirocin 2 % ointment 1 g BID    mycophenolate capsule 1,000 mg BID    ondansetron disintegrating tablet 4 mg Q8H PRN    pantoprazole EC tablet 40 mg Daily    posaconazole tablet 300 mg Daily    potassium chloride 40 mEq in 100 mL IVPB (FOR CENTRAL LINE ADMINISTRATION ONLY) PRN    And    potassium chloride 20 mEq in 100 mL IVPB (FOR CENTRAL LINE ADMINISTRATION ONLY) PRN    And    potassium chloride 40 mEq in 100 mL IVPB (FOR CENTRAL LINE ADMINISTRATION ONLY)  PRN    QUEtiapine tablet 25 mg QHS    sodium chloride 0.9% flush 3 mL PRN    [START ON 1/1/2018] sulfamethoxazole-trimethoprim 400-80mg per tablet 1 tablet Every Mon, Wed, Fri    tacrolimus capsule 1 mg BID    [START ON 1/1/2018] valGANciclovir tablet 450 mg Every Mon, Wed, Fri       Objective:     Vital Signs (Most Recent):  Temp: 98.7 °F (37.1 °C) (12/30/17 2000)  Pulse: 76 (12/30/17 2000)  Resp: 18 (12/30/17 2000)  BP: 120/70 (12/30/17 2000)  SpO2: 98 % (12/30/17 2000)  O2 Device (Oxygen Therapy): room air (12/30/17 2000) Vital Signs (24h Range):  Temp:  [97.5 °F (36.4 °C)-98.7 °F (37.1 °C)] 98.7 °F (37.1 °C)  Pulse:  [76-83] 76  Resp:  [16-18] 18  SpO2:  [96 %-99 %] 98 %  BP: (113-121)/(62-71) 120/70     Weight: 71.5 kg (157 lb 10.1 oz) (12/30/17 0500)  Body mass index is 26.23 kg/m².  Body surface area is 1.81 meters squared.    I/O last 3 completed shifts:  In: 1460 [P.O.:1460]  Out: 531 [Urine:530; Stool:1]    Physical Exam   Constitutional: She is oriented to person, place, and time. She appears well-developed and well-nourished. No distress.   HENT:   Head: Normocephalic and atraumatic.   Eyes: Scleral icterus is present.   Neck: Normal range of motion. Neck supple.   Cardiovascular: Normal rate.    No murmur heard.  Pulmonary/Chest: Effort normal. She has decreased breath sounds in the right lower field and the left lower field.   Abdominal: Soft. Bowel sounds are normal. She exhibits distension.   Musculoskeletal: Normal range of motion. She exhibits no edema or tenderness.   Neurological: She is alert and oriented to person, place, and time.   Skin: Skin is warm. She is not diaphoretic.   Vitals reviewed.      Significant Labs:  ABGs:   Recent Labs  Lab 12/28/17  0854   PH 7.459*   PCO2 22.6*   HCO3 16.0*   POCSATURATED 97   BE -8     BMP:   Recent Labs  Lab 12/30/17  0430   *  115*     100   CO2 27  27   BUN 24*  24*   CREATININE 1.9*  1.9*   CALCIUM 7.7*  7.7*   MG 2.5     CBC:    Recent Labs  Lab 12/30/17 0430   WBC 8.25   RBC 2.80*   HGB 8.6*   HCT 25.3*   PLT 59*   MCV 90   MCH 30.7   MCHC 34.0     CMP:   Recent Labs  Lab 12/30/17 0430   *  115*   CALCIUM 7.7*  7.7*   ALBUMIN 3.0*  3.0*   PROT 5.0*   *  135*   K 4.3  4.3   CO2 27  27     100   BUN 24*  24*   CREATININE 1.9*  1.9*   ALKPHOS 114   *   AST 46*   BILITOT 2.9*     All labs within the past 24 hours have been reviewed.

## 2017-12-31 NOTE — ASSESSMENT & PLAN NOTE
- POD 1 US with elevated RIs of 1 and two perihepatic fluid collections.  - repeat on POD 7 (ordered).  - LFTs improving.  - appetite fair. Prealbumin 25. Supplements ordered.  - No c/o pain.  - (+) BM.

## 2017-12-31 NOTE — SUBJECTIVE & OBJECTIVE
Interval History:   Patient evaluated at bedside, no significant events overnight, total intake yesterday 590 cc and output 245 plus 2 unmeasured urine output.     Review of patient's allergies indicates:   Allergen Reactions    Ferrous sulfate Other (See Comments)     Patient states the pill makes her sick. She stated she would rather have a shot     Current Facility-Administered Medications   Medication Frequency    amoxicillin capsule 500 mg Daily    docusate sodium capsule 100 mg TID    famotidine tablet 20 mg QHS    heparin (porcine) injection 5,000 Units Q8H    hydrALAZINE injection 10 mg Q6H PRN    HYDROmorphone tablet 2 mg Q3H PRN    HYDROmorphone tablet 4 mg Q3H PRN    labetalol injection 10 mg Q4H PRN    methylPREDNISolone sodium succinate injection 20 mg Q12H    Followed by    [START ON 1/1/2018] predniSONE tablet 20 mg Daily    mycophenolate capsule 1,000 mg BID    ondansetron disintegrating tablet 4 mg Q8H PRN    pantoprazole EC tablet 40 mg Daily    posaconazole tablet 300 mg Daily    QUEtiapine tablet 25 mg QHS    sodium chloride 0.9% flush 3 mL PRN    [START ON 1/1/2018] sulfamethoxazole-trimethoprim 400-80mg per tablet 1 tablet Every Mon, Wed, Fri    tacrolimus capsule 2 mg BID    [START ON 1/1/2018] valGANciclovir tablet 450 mg Every Mon, Wed, Fri       Objective:     Vital Signs (Most Recent):  Temp: 97.9 °F (36.6 °C) (12/31/17 1226)  Pulse: 79 (12/31/17 1226)  Resp: 18 (12/31/17 1226)  BP: 118/73 (12/31/17 1226)  SpO2: 99 % (12/31/17 1226)  O2 Device (Oxygen Therapy): room air (12/31/17 1226) Vital Signs (24h Range):  Temp:  [97.7 °F (36.5 °C)-98.7 °F (37.1 °C)] 97.9 °F (36.6 °C)  Pulse:  [74-79] 79  Resp:  [16-18] 18  SpO2:  [96 %-99 %] 99 %  BP: (116-122)/(68-73) 118/73     Weight: 70.5 kg (155 lb 6.8 oz) (12/31/17 0526)  Body mass index is 25.86 kg/m².  Body surface area is 1.8 meters squared.    I/O last 3 completed shifts:  In: 590 [P.O.:590]  Out: 417 [Urine:415;  Stool:2]    Physical Exam   Constitutional: She is oriented to person, place, and time. She appears well-developed and well-nourished. No distress.   HENT:   Head: Normocephalic and atraumatic.   Eyes: Scleral icterus is present.   Neck: Normal range of motion. Neck supple.   Cardiovascular: Normal rate.    No murmur heard.  Pulmonary/Chest: Effort normal. She has decreased breath sounds in the right lower field and the left lower field.   Abdominal: Soft. Bowel sounds are normal. She exhibits distension.   Musculoskeletal: Normal range of motion. She exhibits no edema or tenderness.   Neurological: She is alert and oriented to person, place, and time.   Skin: Skin is warm. She is not diaphoretic.   Vitals reviewed.      Significant Labs:  ABGs:   Recent Labs  Lab 12/28/17  0854   PH 7.459*   PCO2 22.6*   HCO3 16.0*   POCSATURATED 97   BE -8     BMP:   Recent Labs  Lab 12/31/17  0500   *      CO2 26   BUN 32*   CREATININE 1.8*   CALCIUM 8.0*   MG 2.3     CBC:   Recent Labs  Lab 12/31/17  0500   WBC 8.35   RBC 2.71*   HGB 8.4*   HCT 24.5*   PLT 50*   MCV 90   MCH 31.0   MCHC 34.3     CMP:   Recent Labs  Lab 12/31/17  0500   *   CALCIUM 8.0*   ALBUMIN 2.8*   PROT 4.8*   *   K 3.8   CO2 26      BUN 32*   CREATININE 1.8*   ALKPHOS 102   ALT 87*   AST 38   BILITOT 2.5*     All labs within the past 24 hours have been reviewed.

## 2017-12-31 NOTE — PROGRESS NOTES
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 Patient presented to [facility] with generalized weakness. Onset two months ago, progressively worsening. Was admitted to  11/10 for hyponatremia seen on labs in GI office. Discharged on 11/15 and returned on 11/20 with 1 week worsening weakness, malaise, lethargy, and decreased appetite. Denied fever/chills, abdominal pain, bright red or black tarry stools, hematemesis or other abnormal bleeding, confusion or disorientation, changes in urination, light-headedness, headache, changes in vision, dyspnea, chest pain or palpitations. Labs demonstrated Na 116 and MELD 32; patient admitted for hyponatremia. Nephrology consulted, recommended fluid restriction , holding diuretics, and prednisode taper that started 11/23 (decreased from 40mg QD to 20mg Qd.) Patient transferred to [facility] for revaluation of liver disease. Of note, paracentesis performed 11/22, removed 2L, negative for SBP. Liver biopsy 10/24 demonstrated stage 4 cirrhosis. Records reports EGD in August 2016 that showed small esophageal varices with severe gastritis. Patient accepted for Liver transplant. Had liver transplant on 12/26/17. No evident complications during surgery. Was making good amount of urine. On next day presented with DEREK were serum creatinine increased from 1.2 -->1.6 and urinary output started to decreased. Today sCr continued to trend upward to 2.1, serum bicarbonate decreasing to 15, anuric and  chest x ray present moderate edema. This is when nephrology was consulted.     Interval History:   Patient evaluated at bedside, no significant events overnight, total intake yesterday 590 cc and output 245 plus 2 unmeasured urine output.     Review of patient's allergies indicates:   Allergen Reactions    Ferrous sulfate Other (See Comments)     Patient states the pill makes her sick. She stated she would rather have a shot     Current Facility-Administered Medications   Medication Frequency    amoxicillin capsule 500 mg Daily    docusate sodium capsule 100 mg TID    famotidine tablet 20 mg QHS    heparin (porcine) injection 5,000 Units Q8H    hydrALAZINE injection 10 mg Q6H PRN    HYDROmorphone tablet 2 mg Q3H PRN    HYDROmorphone tablet 4 mg Q3H PRN    labetalol injection 10 mg Q4H PRN    methylPREDNISolone sodium succinate injection 20 mg Q12H    Followed by    [START ON 1/1/2018] predniSONE tablet 20 mg Daily    mycophenolate capsule 1,000 mg BID    ondansetron disintegrating tablet 4 mg Q8H PRN    pantoprazole EC tablet 40 mg Daily    posaconazole tablet 300 mg Daily    QUEtiapine tablet 25 mg QHS    sodium chloride 0.9% flush 3 mL PRN    [START ON 1/1/2018] sulfamethoxazole-trimethoprim 400-80mg per tablet 1 tablet Every Mon, Wed, Fri    tacrolimus capsule 2 mg BID    [START ON 1/1/2018] valGANciclovir tablet 450 mg Every Mon, Wed, Fri       Objective:     Vital Signs (Most Recent):  Temp: 97.9 °F (36.6 °C) (12/31/17 1226)  Pulse: 79 (12/31/17 1226)  Resp: 18 (12/31/17 1226)  BP: 118/73 (12/31/17 1226)  SpO2: 99 % (12/31/17 1226)  O2 Device (Oxygen Therapy): room air (12/31/17 1226) Vital Signs (24h Range):  Temp:  [97.7 °F (36.5 °C)-98.7 °F (37.1 °C)] 97.9 °F (36.6 °C)  Pulse:  [74-79] 79  Resp:  [16-18] 18  SpO2:  [96 %-99 %] 99 %  BP: (116-122)/(68-73) 118/73     Weight: 70.5 kg (155 lb 6.8 oz) (12/31/17 0526)  Body mass index is 25.86 kg/m².  Body surface area is 1.8 meters  squared.    I/O last 3 completed shifts:  In: 590 [P.O.:590]  Out: 417 [Urine:415; Stool:2]    Physical Exam   Constitutional: She is oriented to person, place, and time. She appears well-developed and well-nourished. No distress.   HENT:   Head: Normocephalic and atraumatic.   Eyes: Scleral icterus is present.   Neck: Normal range of motion. Neck supple.   Cardiovascular: Normal rate.    No murmur heard.  Pulmonary/Chest: Effort normal. She has decreased breath sounds in the right lower field and the left lower field.   Abdominal: Soft. Bowel sounds are normal. She exhibits distension.   Musculoskeletal: Normal range of motion. She exhibits no edema or tenderness.   Neurological: She is alert and oriented to person, place, and time.   Skin: Skin is warm. She is not diaphoretic.   Vitals reviewed.      Significant Labs:  ABGs:   Recent Labs  Lab 12/28/17  0854   PH 7.459*   PCO2 22.6*   HCO3 16.0*   POCSATURATED 97   BE -8     BMP:   Recent Labs  Lab 12/31/17  0500   *      CO2 26   BUN 32*   CREATININE 1.8*   CALCIUM 8.0*   MG 2.3     CBC:   Recent Labs  Lab 12/31/17  0500   WBC 8.35   RBC 2.71*   HGB 8.4*   HCT 24.5*   PLT 50*   MCV 90   MCH 31.0   MCHC 34.3     CMP:   Recent Labs  Lab 12/31/17  0500   *   CALCIUM 8.0*   ALBUMIN 2.8*   PROT 4.8*   *   K 3.8   CO2 26      BUN 32*   CREATININE 1.8*   ALKPHOS 102   ALT 87*   AST 38   BILITOT 2.5*     All labs within the past 24 hours have been reviewed.         Assessment/Plan:     DEREK (acute kidney injury)     Marcie Bazan is a 52 year old female with pass history of liver cirrhosis secondary to ETOH, who recently had liver transplanat on 12/26/17, which afterward presented with DEREK that mostly secondary to ischemic ATN after surgery, serum creatinine has been worsening and became anuric.     Plan:   - Serum creatinine baseline 0.7-0.9  - Some improvement of serum creatinine from 1.9-->1.8   - Patient made more urine yesterday 245 cc  plus 2 unmeasured voids, also had a dose of lasix 80 mg given in the morning, would continue to follow.   - Will Hold on SLED today and reassess tomorrow if require dialysis, if patient present acidosis, any sign of volume overload or electrolyte abnormality will start again on SLED.  - Urine sediment presented with few RBCs (no dysmorphic cells), no casts   - Renal ultrasound with decrease perfusion and increase renal cortical echogenicity suggesting chronic renal disease.  No evidence of hydronephrosis, masses or nephrolysiasis   -Avoid nephrotoxic medications and renal dose medications  - Continue with intake and output           Vito Capellan  Nephrology  Fellow  [facility]    Pager [phone]    Patient seen and examined with Dr Esposito;   I have reviewed and agree with assessment and plan

## 2017-12-31 NOTE — SUBJECTIVE & OBJECTIVE
Scheduled Meds:   amoxicillin  500 mg Oral Daily    docusate sodium  100 mg Oral TID    famotidine  20 mg Oral QHS    heparin (porcine)  5,000 Units Subcutaneous Q8H    methylPREDNISolone sodium succinate  20 mg Intravenous Q12H    Followed by    [START ON 1/1/2018] predniSONE  20 mg Oral Daily    mycophenolate  1,000 mg Oral BID    pantoprazole  40 mg Oral Daily    posaconazole  300 mg Oral Daily    QUEtiapine  25 mg Oral QHS    [START ON 1/1/2018] sulfamethoxazole-trimethoprim 400-80mg  1 tablet Oral Every Mon, Wed, Fri    tacrolimus  2 mg Oral BID    [START ON 1/1/2018] valGANciclovir  450 mg Oral Every Mon, Wed, Fri     Continuous Infusions:  PRN Meds:hydrALAZINE, HYDROmorphone, HYDROmorphone, labetalol, ondansetron, sodium chloride 0.9%    Review of Systems   Constitutional: Positive for activity change, appetite change and fatigue. Negative for chills and fever.   HENT: Negative for congestion and facial swelling.    Eyes: Negative for pain, discharge and visual disturbance.   Respiratory: Negative for cough, chest tightness, shortness of breath and wheezing.    Cardiovascular: Negative for chest pain, palpitations and leg swelling.   Gastrointestinal: Positive for abdominal distention. Negative for abdominal pain, constipation, diarrhea, nausea and vomiting.   Endocrine: Negative.    Genitourinary: Positive for decreased urine volume. Negative for difficulty urinating and hematuria.   Musculoskeletal: Negative for back pain.   Skin: Positive for wound. Negative for pallor and rash.   Allergic/Immunologic: Positive for immunocompromised state.   Neurological: Negative for dizziness, tremors, weakness and light-headedness.   Hematological: Negative.    Psychiatric/Behavioral: Positive for confusion and dysphoric mood. Negative for agitation. The patient is not nervous/anxious.      Objective:     Vital Signs (Most Recent):  Temp: 97.9 °F (36.6 °C) (12/31/17 0720)  Pulse: 74 (12/31/17 0720)  Resp:  16 (12/31/17 0720)  BP: 122/72 (12/31/17 0720)  SpO2: 98 % (12/31/17 0720) Vital Signs (24h Range):  Temp:  [97.7 °F (36.5 °C)-98.7 °F (37.1 °C)] 97.9 °F (36.6 °C)  Pulse:  [74-78] 74  Resp:  [16-18] 16  SpO2:  [96 %-99 %] 98 %  BP: (116-122)/(68-73) 122/72     Weight: 70.5 kg (155 lb 6.8 oz)  Body mass index is 25.86 kg/m².    Intake/Output - Last 3 Shifts       12/29 0700 - 12/30 0659 12/30 0700 - 12/31 0659 12/31 0700 - 01/01 0659    P.O. 960 590 360    I.V. (mL/kg) 2033 (28.4)      Total Intake(mL/kg) 2993 (41.9) 590 (8.4) 360 (5.1)    Urine (mL/kg/hr) 285 (0.2) 245 (0.1)     Emesis/NG output  0 (0)     Drains 5 (0)      Other 484 (0.3) 0 (0)     Stool  2 (0)     Total Output 774 247      Net +2219 +343 +360           Urine Occurrence  2 x 1 x    Stool Occurrence  4 x 1 x    Emesis Occurrence  0 x           Physical Exam   Constitutional: She is oriented to person, place, and time. She appears well-developed and well-nourished.   HENT:   Head: Normocephalic and atraumatic.   Eyes: EOM are normal. Pupils are equal, round, and reactive to light. No scleral icterus.   Neck: Normal range of motion. Neck supple. No JVD present.   Cardiovascular: Normal rate, regular rhythm and normal heart sounds.    No murmur heard.  Pulmonary/Chest: Effort normal and breath sounds normal. No respiratory distress. She has no wheezes.   Abdominal: Soft. Bowel sounds are normal. She exhibits no distension.   Chevron inc with staples intact and minimal oozing noted. No s/s/i   Genitourinary:   Genitourinary Comments: Rivera to gravity, clear yellow urine   Musculoskeletal: Normal range of motion. She exhibits no edema.   Neurological: She is alert and oriented to person, place, and time.   Skin: Skin is warm and dry.   Psychiatric: She has a normal mood and affect. Her behavior is normal.   Nursing note and vitals reviewed.      Laboratory:  Immunosuppressants         Stop Route Frequency     tacrolimus capsule 2 mg      -- Oral 2 times  daily     mycophenolate capsule 1,000 mg      -- Oral 2 times daily        CBC:   Recent Labs  Lab 12/31/17  0500   WBC 8.35   RBC 2.71*   HGB 8.4*   HCT 24.5*   PLT 50*   MCV 90   MCH 31.0   MCHC 34.3     CMP:   Recent Labs  Lab 12/31/17  0500   *   CALCIUM 8.0*   ALBUMIN 2.8*   PROT 4.8*   *   K 3.8   CO2 26      BUN 32*   CREATININE 1.8*   ALKPHOS 102   ALT 87*   AST 38   BILITOT 2.5*     Labs within the past 24 hours have been reviewed.    Diagnostic Results:  I have personally reviewed all pertinent imaging studies.

## 2017-12-31 NOTE — ASSESSMENT & PLAN NOTE
- Cr 1.8 from 1.9.   - nephrology following.  - CRRT on 12/28.  - no need for RRT at this time.  - will trial lasix 80 mg IV x 1. Monitor.

## 2017-12-31 NOTE — ASSESSMENT & PLAN NOTE
Marcie Bazan is a 52 year old female with pass history of liver cirrhosis secondary to ETOH, who recently had liver transplanat on 12/26/17, which afterward presented with DEREK that mostly secondary to ischemic ATN after surgery, serum creatinine has been worsening and became anuric.     Plan:   - Serum creatinine baseline 0.7-0.9  - Some improvement of serum creatinine from 1.9-->1.8   - Patient made more urine yesterday 245 cc plus 2 unmeasured voids, also had a dose of lasix 80 mg given in the morning, would continue to follow.   - Will Hold on SLED today and reassess tomorrow if require dialysis, if patient present acidosis, any sign of volume overload or electrolyte abnormality will start again on SLED.  - Urine sediment presented with few RBCs (no dysmorphic cells), no casts   - Renal ultrasound with decrease perfusion and increase renal cortical echogenicity suggesting chronic renal disease.  No evidence of hydronephrosis, masses or nephrolysiasis   -Avoid nephrotoxic medications and renal dose medications  - Continue with intake and output

## 2018-01-01 PROBLEM — T81.31XA TRANSPLANT WOUND DEHISCENCE: Status: ACTIVE | Noted: 2018-01-01

## 2018-01-01 PROBLEM — R53.81 PHYSICAL DECONDITIONING: Status: ACTIVE | Noted: 2018-01-01

## 2018-01-01 LAB
ABO + RH BLD: NORMAL
ALBUMIN SERPL BCP-MCNC: 2.7 G/DL
ALP SERPL-CCNC: 92 U/L
ALT SERPL W/O P-5'-P-CCNC: 80 U/L
ANION GAP SERPL CALC-SCNC: 8 MMOL/L
AST SERPL-CCNC: 23 U/L
BASOPHILS # BLD AUTO: 0 K/UL
BASOPHILS # BLD AUTO: 0.01 K/UL
BASOPHILS NFR BLD: 0 %
BASOPHILS NFR BLD: 0.1 %
BILIRUB SERPL-MCNC: 2.3 MG/DL
BLD GP AB SCN CELLS X3 SERPL QL: NORMAL
BLD PROD TYP BPU: NORMAL
BLOOD UNIT EXPIRATION DATE: NORMAL
BLOOD UNIT TYPE CODE: 6200
BLOOD UNIT TYPE: NORMAL
BUN SERPL-MCNC: 36 MG/DL
CALCIUM SERPL-MCNC: 8.2 MG/DL
CHLORIDE SERPL-SCNC: 102 MMOL/L
CO2 SERPL-SCNC: 26 MMOL/L
CODING SYSTEM: NORMAL
CREAT SERPL-MCNC: 1.7 MG/DL
DIFFERENTIAL METHOD: ABNORMAL
DIFFERENTIAL METHOD: ABNORMAL
DISPENSE STATUS: NORMAL
EOSINOPHIL # BLD AUTO: 0 K/UL
EOSINOPHIL # BLD AUTO: 0.1 K/UL
EOSINOPHIL NFR BLD: 0.2 %
EOSINOPHIL NFR BLD: 1 %
ERYTHROCYTE [DISTWIDTH] IN BLOOD BY AUTOMATED COUNT: 16 %
ERYTHROCYTE [DISTWIDTH] IN BLOOD BY AUTOMATED COUNT: 16.6 %
EST. GFR  (AFRICAN AMERICAN): 38.9 ML/MIN/1.73 M^2
EST. GFR  (NON AFRICAN AMERICAN): 33.7 ML/MIN/1.73 M^2
GLUCOSE SERPL-MCNC: 119 MG/DL
HCT VFR BLD AUTO: 22.6 %
HCT VFR BLD AUTO: 22.8 %
HCT VFR BLD AUTO: 23.2 %
HGB BLD-MCNC: 7.5 G/DL
HGB BLD-MCNC: 7.7 G/DL
HGB BLD-MCNC: 7.9 G/DL
IMM GRANULOCYTES # BLD AUTO: 0.08 K/UL
IMM GRANULOCYTES # BLD AUTO: 0.09 K/UL
IMM GRANULOCYTES NFR BLD AUTO: 0.7 %
IMM GRANULOCYTES NFR BLD AUTO: 0.9 %
INR PPP: 1.1
LYMPHOCYTES # BLD AUTO: 1.1 K/UL
LYMPHOCYTES # BLD AUTO: 1.1 K/UL
LYMPHOCYTES NFR BLD: 12.7 %
LYMPHOCYTES NFR BLD: 8.2 %
MAGNESIUM SERPL-MCNC: 2 MG/DL
MCH RBC QN AUTO: 31.2 PG
MCH RBC QN AUTO: 31.6 PG
MCHC RBC AUTO-ENTMCNC: 33.8 G/DL
MCHC RBC AUTO-ENTMCNC: 34.1 G/DL
MCV RBC AUTO: 92 FL
MCV RBC AUTO: 93 FL
MONOCYTES # BLD AUTO: 0.5 K/UL
MONOCYTES # BLD AUTO: 0.7 K/UL
MONOCYTES NFR BLD: 5.1 %
MONOCYTES NFR BLD: 5.6 %
NEUTROPHILS # BLD AUTO: 11.6 K/UL
NEUTROPHILS # BLD AUTO: 6.9 K/UL
NEUTROPHILS NFR BLD: 80.6 %
NEUTROPHILS NFR BLD: 84.9 %
NRBC BLD-RTO: 0 /100 WBC
NRBC BLD-RTO: 0 /100 WBC
PHOSPHATE SERPL-MCNC: 5.1 MG/DL
PLATELET # BLD AUTO: 46 K/UL
PLATELET # BLD AUTO: 53 K/UL
PMV BLD AUTO: 11.9 FL
PMV BLD AUTO: 12.1 FL
POTASSIUM SERPL-SCNC: 3.4 MMOL/L
PROT SERPL-MCNC: 4.7 G/DL
PROTHROMBIN TIME: 11.8 SEC
RBC # BLD AUTO: 2.47 M/UL
RBC # BLD AUTO: 2.5 M/UL
SODIUM SERPL-SCNC: 136 MMOL/L
TACROLIMUS BLD-MCNC: 7.3 NG/ML
TRANS PLATPHERESIS VOL PATIENT: NORMAL ML
WBC # BLD AUTO: 13.66 K/UL
WBC # BLD AUTO: 8.53 K/UL

## 2018-01-01 PROCEDURE — P9035 PLATELET PHERES LEUKOREDUCED: HCPCS

## 2018-01-01 PROCEDURE — 63600175 PHARM REV CODE 636 W HCPCS: Performed by: TRANSPLANT SURGERY

## 2018-01-01 PROCEDURE — 80197 ASSAY OF TACROLIMUS: CPT

## 2018-01-01 PROCEDURE — 63600175 PHARM REV CODE 636 W HCPCS: Performed by: NURSE PRACTITIONER

## 2018-01-01 PROCEDURE — 25000003 PHARM REV CODE 250: Performed by: STUDENT IN AN ORGANIZED HEALTH CARE EDUCATION/TRAINING PROGRAM

## 2018-01-01 PROCEDURE — 99233 SBSQ HOSP IP/OBS HIGH 50: CPT | Mod: ,,, | Performed by: NURSE PRACTITIONER

## 2018-01-01 PROCEDURE — 86850 RBC ANTIBODY SCREEN: CPT

## 2018-01-01 PROCEDURE — 63600175 PHARM REV CODE 636 W HCPCS: Performed by: STUDENT IN AN ORGANIZED HEALTH CARE EDUCATION/TRAINING PROGRAM

## 2018-01-01 PROCEDURE — 80053 COMPREHEN METABOLIC PANEL: CPT

## 2018-01-01 PROCEDURE — 20600001 HC STEP DOWN PRIVATE ROOM

## 2018-01-01 PROCEDURE — 83735 ASSAY OF MAGNESIUM: CPT

## 2018-01-01 PROCEDURE — 85025 COMPLETE CBC W/AUTO DIFF WBC: CPT

## 2018-01-01 PROCEDURE — 25000003 PHARM REV CODE 250: Performed by: NURSE PRACTITIONER

## 2018-01-01 PROCEDURE — 0HC7XZZ EXTIRPATION OF MATTER FROM ABDOMEN SKIN, EXTERNAL APPROACH: ICD-10-PCS | Performed by: TRANSPLANT SURGERY

## 2018-01-01 PROCEDURE — 36430 TRANSFUSION BLD/BLD COMPNT: CPT

## 2018-01-01 PROCEDURE — 86920 COMPATIBILITY TEST SPIN: CPT

## 2018-01-01 PROCEDURE — 85014 HEMATOCRIT: CPT

## 2018-01-01 PROCEDURE — 85018 HEMOGLOBIN: CPT

## 2018-01-01 PROCEDURE — 84100 ASSAY OF PHOSPHORUS: CPT

## 2018-01-01 PROCEDURE — 85610 PROTHROMBIN TIME: CPT

## 2018-01-01 RX ORDER — POTASSIUM CHLORIDE 750 MG/1
40 CAPSULE, EXTENDED RELEASE ORAL DAILY
Status: COMPLETED | OUTPATIENT
Start: 2018-01-01 | End: 2018-01-02

## 2018-01-01 RX ORDER — HYDROCODONE BITARTRATE AND ACETAMINOPHEN 500; 5 MG/1; MG/1
TABLET ORAL
Status: DISCONTINUED | OUTPATIENT
Start: 2018-01-01 | End: 2018-01-01

## 2018-01-01 RX ORDER — SILVER NITRATE 38.21; 12.74 MG/1; MG/1
1 STICK TOPICAL ONCE
Status: COMPLETED | OUTPATIENT
Start: 2018-01-01 | End: 2018-01-01

## 2018-01-01 RX ORDER — BISACODYL 5 MG
5 TABLET, DELAYED RELEASE (ENTERIC COATED) ORAL DAILY PRN
Status: DISCONTINUED | OUTPATIENT
Start: 2018-01-01 | End: 2018-01-05 | Stop reason: HOSPADM

## 2018-01-01 RX ORDER — FUROSEMIDE 10 MG/ML
60 INJECTION INTRAMUSCULAR; INTRAVENOUS ONCE
Status: COMPLETED | OUTPATIENT
Start: 2018-01-01 | End: 2018-01-01

## 2018-01-01 RX ORDER — HYDROCODONE BITARTRATE AND ACETAMINOPHEN 500; 5 MG/1; MG/1
TABLET ORAL
Status: DISCONTINUED | OUTPATIENT
Start: 2018-01-01 | End: 2018-01-05 | Stop reason: HOSPADM

## 2018-01-01 RX ORDER — DOCUSATE SODIUM 100 MG/1
100 CAPSULE, LIQUID FILLED ORAL 2 TIMES DAILY
Status: DISCONTINUED | OUTPATIENT
Start: 2018-01-01 | End: 2018-01-01

## 2018-01-01 RX ADMIN — TACROLIMUS 2 MG: 1 CAPSULE ORAL at 08:01

## 2018-01-01 RX ADMIN — SILVER NITRATE 1 APPLICATOR: 38.21; 12.74 STICK TOPICAL at 11:01

## 2018-01-01 RX ADMIN — FUROSEMIDE 60 MG: 10 INJECTION, SOLUTION INTRAMUSCULAR; INTRAVENOUS at 11:01

## 2018-01-01 RX ADMIN — PREDNISONE 20 MG: 10 TABLET ORAL at 08:01

## 2018-01-01 RX ADMIN — SULFAMETHOXAZOLE AND TRIMETHOPRIM 1 TABLET: 400; 80 TABLET ORAL at 08:01

## 2018-01-01 RX ADMIN — QUETIAPINE FUMARATE 25 MG: 25 TABLET, FILM COATED ORAL at 08:01

## 2018-01-01 RX ADMIN — MYCOPHENOLATE MOFETIL 1000 MG: 250 CAPSULE ORAL at 08:01

## 2018-01-01 RX ADMIN — FAMOTIDINE 20 MG: 20 TABLET, FILM COATED ORAL at 08:01

## 2018-01-01 RX ADMIN — HEPARIN SODIUM 5000 UNITS: 5000 INJECTION, SOLUTION INTRAVENOUS; SUBCUTANEOUS at 05:01

## 2018-01-01 RX ADMIN — POTASSIUM CHLORIDE 40 MEQ: 750 CAPSULE, EXTENDED RELEASE ORAL at 08:01

## 2018-01-01 RX ADMIN — POSACONAZOLE 300 MG: 100 TABLET, COATED ORAL at 08:01

## 2018-01-01 RX ADMIN — TACROLIMUS 2 MG: 1 CAPSULE ORAL at 06:01

## 2018-01-01 RX ADMIN — HYDROMORPHONE HYDROCHLORIDE 4 MG: 2 TABLET ORAL at 08:01

## 2018-01-01 RX ADMIN — HEPARIN SODIUM 5000 UNITS: 5000 INJECTION, SOLUTION INTRAVENOUS; SUBCUTANEOUS at 08:01

## 2018-01-01 RX ADMIN — PANTOPRAZOLE SODIUM 40 MG: 40 TABLET, DELAYED RELEASE ORAL at 08:01

## 2018-01-01 RX ADMIN — VALGANCICLOVIR 450 MG: 450 TABLET, FILM COATED ORAL at 08:01

## 2018-01-01 RX ADMIN — AMOXICILLIN 500 MG: 500 CAPSULE ORAL at 08:01

## 2018-01-01 NOTE — PLAN OF CARE
Problem: Patient Care Overview  Goal: Plan of Care Review  Outcome: Ongoing (interventions implemented as appropriate)  - 3x BM so far this shift (improvment from liquid to soft) with notable incontinence- negative for C-diff  - Incontinent of urine, unable to call for assist in time to catch for urine measurement.  Tried female external catheter with no success due to frequent stools clogging purewick.  Attempted to get OOB to BSC scheduled with poor results.  - Incision with moderate/large amount of bloody drainage, opened at bedside by Dr. Khalil to evacuate hematomas R>L.  MD placed packed wet to dry dressing & WC consulted to start wound vac once appropriate (bleeding stopped/decreased significantly).  - No orders for CRRT or HD today, Creatinine improved 1.8-->1.7.  Lasix 60 mg IV administered (order in placed for K+ replacement in AM, monitor labs).  - Patient is oriented to person - intermittently oriented to place, time, & situation. Less frequent needs for reorientation today.  Blinds open, friend at bedside.  - Incontinence associated dermatitis noted with the frequent stools.  Barrier cream in use & frequent soilage checks.  Assist with turns  - Renal diet with poor appetite.  Tolerating Nepro supplement drink & will encourage additional foods  - Keep bed alarm on, call light in reach, nonslip socks in use, call light at bedside but patient doesn't really use - frequent safety checks as friend is present but not proactive for soilage or safety.

## 2018-01-01 NOTE — SUBJECTIVE & OBJECTIVE
Scheduled Meds:   amoxicillin  500 mg Oral Daily    famotidine  20 mg Oral QHS    heparin (porcine)  5,000 Units Subcutaneous Q8H    mycophenolate  1,000 mg Oral BID    pantoprazole  40 mg Oral Daily    posaconazole  300 mg Oral Daily    potassium chloride  40 mEq Oral Daily    predniSONE  20 mg Oral Daily    QUEtiapine  25 mg Oral QHS    sulfamethoxazole-trimethoprim 400-80mg  1 tablet Oral Every Mon, Wed, Fri    tacrolimus  2 mg Oral BID    valGANciclovir  450 mg Oral Every Mon, Wed, Fri     Continuous Infusions:  PRN Meds:sodium chloride, bisacodyl, hydrALAZINE, HYDROmorphone, HYDROmorphone, labetalol, ondansetron, phenyleph-shark jermaine oil-mo-pet, sodium chloride 0.9%    Review of Systems   Constitutional: Positive for activity change, appetite change and fatigue. Negative for chills and fever.   HENT: Negative for congestion and facial swelling.    Eyes: Negative for pain, discharge and visual disturbance.   Respiratory: Negative for cough, chest tightness, shortness of breath and wheezing.    Cardiovascular: Negative for chest pain, palpitations and leg swelling.   Gastrointestinal: Positive for abdominal distention. Negative for abdominal pain, constipation, diarrhea, nausea and vomiting.   Endocrine: Negative.    Genitourinary: Positive for decreased urine volume. Negative for difficulty urinating and hematuria.   Musculoskeletal: Negative for back pain.   Skin: Positive for wound. Negative for pallor and rash.   Allergic/Immunologic: Positive for immunocompromised state.   Neurological: Negative for dizziness, tremors, weakness and light-headedness.   Hematological: Negative.    Psychiatric/Behavioral: Positive for confusion and dysphoric mood. Negative for agitation. The patient is not nervous/anxious.      Objective:     Vital Signs (Most Recent):  Temp: 97.6 °F (36.4 °C) (01/01/18 1228)  Pulse: 77 (01/01/18 1228)  Resp: 18 (01/01/18 1228)  BP: 127/73 (01/01/18 1228)  SpO2: 99 % (01/01/18 1228)  Vital Signs (24h Range):  Temp:  [96.2 °F (35.7 °C)-98.5 °F (36.9 °C)] 97.6 °F (36.4 °C)  Pulse:  [71-79] 77  Resp:  [16-18] 18  SpO2:  [97 %-100 %] 99 %  BP: (112-135)/(65-74) 127/73     Weight: 68.5 kg (151 lb 0.2 oz)  Body mass index is 25.13 kg/m².    Intake/Output - Last 3 Shifts       12/30 0700 - 12/31 0659 12/31 0700 - 01/01 0659 01/01 0700 - 01/02 0659    P.O. 590 720     I.V. (mL/kg)       Total Intake(mL/kg) 590 (8.4) 720 (10.5)     Urine (mL/kg/hr) 245 (0.1)      Emesis/NG output 0 (0)      Drains       Other 0 (0)      Stool 2 (0)      Total Output 247        Net +343 +720             Urine Occurrence 2 x 3 x     Stool Occurrence 4 x 7 x     Emesis Occurrence 0 x            Physical Exam   Constitutional: She is oriented to person, place, and time. She appears well-developed and well-nourished.   HENT:   Head: Normocephalic and atraumatic.   Eyes: EOM are normal. Pupils are equal, round, and reactive to light. No scleral icterus.   Neck: Normal range of motion. Neck supple. No JVD present.   Cardiovascular: Normal rate, regular rhythm and normal heart sounds.    No murmur heard.  Pulmonary/Chest: Effort normal and breath sounds normal. No respiratory distress. She has no wheezes.   Abdominal: Soft. Bowel sounds are normal. She exhibits no distension.   Chevron inc right and left ends opened.  Wet to dry dressing.  Mid chevron w staples CDI.    MAXWELL sites clean w no drainage.     Genitourinary:   Genitourinary Comments: Incont of urine   Musculoskeletal: Normal range of motion. She exhibits no edema.   Neurological: She is alert and oriented to person, place, and time.   Skin: Skin is warm and dry. Capillary refill takes 2 to 3 seconds.   Psychiatric: She has a normal mood and affect. Her behavior is normal.   Mentation slow.  Alert to person place and time.     Nursing note and vitals reviewed.      Laboratory:  Immunosuppressants         Stop Route Frequency     tacrolimus capsule 2 mg      -- Oral 2  times daily     mycophenolate capsule 1,000 mg      -- Oral 2 times daily        CBC:     Recent Labs  Lab 01/01/18  1155   WBC 13.66*   RBC 2.50*   HGB 7.9*   HCT 23.2*   PLT 53*   MCV 93   MCH 31.6*   MCHC 34.1     CMP:     Recent Labs  Lab 01/01/18  0400   *   CALCIUM 8.2*   ALBUMIN 2.7*   PROT 4.7*      K 3.4*   CO2 26      BUN 36*   CREATININE 1.7*   ALKPHOS 92   ALT 80*   AST 23   BILITOT 2.3*     Tacrolimus Lvl   Date Value Ref Range Status   01/01/2018 7.3 5.0 - 15.0 ng/mL Final     Comment:     Testing performed by Liquid Chromatography-Tandem  Mass Spectrometry (LC-MS/MS).  This test was developed and its performance characteristics  determined by [facility], Department of Pathology  and Laboratory Medicine in a manner consistent with CLIA  requirements. It has not been cleared or approved by the [place]  Food and Drug Administration.  This test is used for clinical   purposes.  It should not be regarded as investigational or for  research.     12/31/2017 5.8 5.0 - 15.0 ng/mL Final     Comment:     Testing performed by Liquid Chromatography-Tandem  Mass Spectrometry (LC-MS/MS).  This test was developed and its performance characteristics  determined by [facility], Department of Pathology  and Laboratory Medicine in a manner consistent with CLIA  requirements. It has not been cleared or approved by the [place]  Food and Drug Administration.  This test is used for clinical   purposes.  It should not be regarded as investigational or for  research.     12/30/2017 7.0 5.0 - 15.0 ng/mL Final     Comment:     Testing performed by Liquid Chromatography-Tandem  Mass Spectrometry (LC-MS/MS).  This test was developed and its performance characteristics  determined by [facility], Department of Pathology  and Laboratory Medicine in a manner consistent with CLIA  requirements. It has not been cleared or approved by the [place]  Food and Drug Administration.  This test is used for  clinical   purposes.  It should not be regarded as investigational or for  research.     12/29/2017 9.9 5.0 - 15.0 ng/mL Final     Comment:     Testing performed by Liquid Chromatography-Tandem  Mass Spectrometry (LC-MS/MS).  This test was developed and its performance characteristics  determined by [facility], Department of Pathology  and Laboratory Medicine in a manner consistent with CLIA  requirements. It has not been cleared or approved by the [place]  Food and Drug Administration.  This test is used for clinical   purposes.  It should not be regarded as investigational or for  research.         Labs within the past 24 hours have been reviewed.    Diagnostic Results:  I have personally reviewed all pertinent imaging studies.

## 2018-01-01 NOTE — ASSESSMENT & PLAN NOTE
- H&H trending down.  Type and screen current.  - plt x1 given today also for ozzing of incision.

## 2018-01-01 NOTE — PROGRESS NOTES
[facility]  Liver Transplant  Progress Note    Patient Name: Marcie Bazan  MRN: [id number]  Admission Date: 2017  Hospital Length of Stay: 39 days  Code Status: Full Code  Primary Care Provider: ODILIA Wall  Post-Operative Day: 6    ORGAN:   LIVER  Disease Etiology: Alcoholic Cirrhosis  Donor Type:    - Brain Death  CDC High Risk:   No  Donor CMV Status:   Donor CMV Status: Negative  Donor HBcAB:   Negative  Donor HCV Status:   Negative  Whole or Partial: Whole Liver  Biliary Anastomosis: End to End  Arterial Anatomy: Standard  Subjective:     History of Present Illness:  HPI:  52 year old female with PMHx of 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 Patient presented to [facility] with generalized weakness. Onset two months ago, progressively worsening. Was admitted to  11/10 for hyponatremia seen on labs in GI office. Discharged on 11/15 and returned on  with 1 week worsening weakness, malaise, lethargy, and decreased appetite. Denied fever/chills, abdominal pain, bright red or black tarry stools, hematemesis or other abnormal bleeding, confusion or disorientation, changes in urination, light-headedness, headache, changes in vision, dyspnea, chest pain or palpitations. Labs demonstrated Na 116 and MELD 32; patient admitted for hyponatremia. Nephrology consulted, recommended fluid restriction , holding diuretics, and prednisode taper that started  (decreased from 40mg QD to 20mg Qd.) Patient transferred to [facility] for revaluation of liver disease. Of note, paracentesis performed , removed 2L, negative for SBP.     Follows with Dr. Souza for cirrhosis. Since early 2017, patient's liver failure progressing indicated by worsening clinical signs (abdominal distention, weakness/fatigue) and MELD. In  mid-October, patient's PETH demonstrated EtOH use and she was denied candidacy for liver transplant. She was then enrolled in in-patient substance abuse program at [facility] where she has remained since early November; per patient, her last EtOH beverage was 11/01/2017. Denies history of GI bleed, SBP, renal disease, or other complications of cirrhosis. Liver biopsy 10/24 demonstrated stage 4 cirrhosis. Records reports EGD in August 2016 that showed small esophageal varices with severe gastritis. Colonoscopy 2015 with 2 polyps and internal hemorrhoids Started on prednisone 40mg QD on 10/25; was on spironolactone, has been held since 11/10 for hypoNa; on lactulose.    Ms. Bazan is now s/p OLTX 12/26/17 for ETOH cirrhosis and PERKINS (steroid induction, DBD, CMV D-/R+). Post op course with confusion and disorientation. Mental status improving. Currently she is taking seroquel night. Nephrology following. CRRT started 12/28.     Hospital Course:  Mental status improving with hepatic encephalopathy treatment. Started empiric ceftriaxone given leukocytosis in immunocompromised state. CMV PCR positive, for which ID was consulted, recommended repeating titers in 1 week. Seen by Psychiatry and  who determined she was moderate risk for Liver transplant. EGD/Cscope and mammogram ordered. Colonoscopy not performed due to poor prep, EGD revealed large varices which were banded. IR paracentesis ordered after patient started complaining of abdominal pain. Mammogram performed, IR paracentesis after INR Reversal negative for SBP.  As pt with low risk malignancy, it was decided not to proceed with a breast biopsy.  Patient accepted for Liver transplant. Listed with MELD 35. Patient was slightly confused and altered, which resolved with lactulose enemas.     Liver transplant 12/27/2017 (DBD, EBV D+/R+, CMV D-/R+, donor normal anatomy)     POD# 0 - Slow to arouse from sedation, not following commands, minimal vent setting  however unable to extubate due to mental status. Poor UOP.     POD#1 - Extubated, pt more alert and able to follow commands however confused and disoriented. Poor UOP 10-15 cc/hr. LFTs trending down , normalized INR, Liver US within normal limits. Thrombocytopenia, 1 pack plts given with line removal, transducer in RIJ  Developed hematoma, required holding pressure for 1.5hrs and another pack of plts.     POD#2 - Improve mental status AAO to person and place, not situation or time. Continues to have minimal UOP not responsive to lasix, Nephrology consulted, started CRRT. LFTs continue to trend down, INR normalized, plts 136     POD# 3 - UOP remaines 5cc/hr, tolerated CRRT for 10 hours, 2.5L UF. Mental status continues to clear up. AF, HDS, tolerating diet, OOB, pain controlled.    Interval history: no acute events overnight. Mentation slow but she is alert and oriented to person, place and time.  Continue seroquel nightly. LFTs improving. POD 1 US showed elevated RIs of 1 and two perihepatic fluid collections. Plan for repeat US in am.  H/H trended down.  Lg hematoma evacuated in right end of chevron- suture placed per fellow to bleed.  Medium size hematoma evacuated from left.  Both wounds dressed wet to dry.  Wd care to assess in am for possible wd vac placement either tomoorow or Wednesday when less bleeding.  OK to remove suture placed today when wd bed healing.  Her appetite is fair.  Cont supplements.  Abdominal pain well managed.  She is being treated w Amoxil for (+) enterococcus UTI prior to txp. Repeat with no growth.  Cr improving slowly daily.  Nephrology following. Pt diuresed well w lasix 80 mg.  Plan for 60 mg today.  Family at bedside.  Monitor.     Scheduled Meds:   amoxicillin  500 mg Oral Daily    famotidine  20 mg Oral QHS    heparin (porcine)  5,000 Units Subcutaneous Q8H    mycophenolate  1,000 mg Oral BID    pantoprazole  40 mg Oral Daily    posaconazole  300 mg Oral Daily    potassium  chloride  40 mEq Oral Daily    predniSONE  20 mg Oral Daily    QUEtiapine  25 mg Oral QHS    sulfamethoxazole-trimethoprim 400-80mg  1 tablet Oral Every Mon, Wed, Fri    tacrolimus  2 mg Oral BID    valGANciclovir  450 mg Oral Every Mon, Wed, Fri     Continuous Infusions:  PRN Meds:sodium chloride, bisacodyl, hydrALAZINE, HYDROmorphone, HYDROmorphone, labetalol, ondansetron, phenyleph-shark jermaine oil-mo-pet, sodium chloride 0.9%    Review of Systems   Constitutional: Positive for activity change, appetite change and fatigue. Negative for chills and fever.   HENT: Negative for congestion and facial swelling.    Eyes: Negative for pain, discharge and visual disturbance.   Respiratory: Negative for cough, chest tightness, shortness of breath and wheezing.    Cardiovascular: Negative for chest pain, palpitations and leg swelling.   Gastrointestinal: Positive for abdominal distention. Negative for abdominal pain, constipation, diarrhea, nausea and vomiting.   Endocrine: Negative.    Genitourinary: Positive for decreased urine volume. Negative for difficulty urinating and hematuria.   Musculoskeletal: Negative for back pain.   Skin: Positive for wound. Negative for pallor and rash.   Allergic/Immunologic: Positive for immunocompromised state.   Neurological: Negative for dizziness, tremors, weakness and light-headedness.   Hematological: Negative.    Psychiatric/Behavioral: Positive for confusion and dysphoric mood. Negative for agitation. The patient is not nervous/anxious.      Objective:     Vital Signs (Most Recent):  Temp: 97.6 °F (36.4 °C) (01/01/18 1228)  Pulse: 77 (01/01/18 1228)  Resp: 18 (01/01/18 1228)  BP: 127/73 (01/01/18 1228)  SpO2: 99 % (01/01/18 1228) Vital Signs (24h Range):  Temp:  [96.2 °F (35.7 °C)-98.5 °F (36.9 °C)] 97.6 °F (36.4 °C)  Pulse:  [71-79] 77  Resp:  [16-18] 18  SpO2:  [97 %-100 %] 99 %  BP: (112-135)/(65-74) 127/73     Weight: 68.5 kg (151 lb 0.2 oz)  Body mass index is 25.13  kg/m².    Intake/Output - Last 3 Shifts       12/30 0700 - 12/31 0659 12/31 0700 - 01/01 0659 01/01 0700 - 01/02 0659    P.O. 590 720     I.V. (mL/kg)       Total Intake(mL/kg) 590 (8.4) 720 (10.5)     Urine (mL/kg/hr) 245 (0.1)      Emesis/NG output 0 (0)      Drains       Other 0 (0)      Stool 2 (0)      Total Output 247        Net +343 +720             Urine Occurrence 2 x 3 x     Stool Occurrence 4 x 7 x     Emesis Occurrence 0 x            Physical Exam   Constitutional: She is oriented to person, place, and time. She appears well-developed and well-nourished.   HENT:   Head: Normocephalic and atraumatic.   Eyes: EOM are normal. Pupils are equal, round, and reactive to light. No scleral icterus.   Neck: Normal range of motion. Neck supple. No JVD present.   Cardiovascular: Normal rate, regular rhythm and normal heart sounds.    No murmur heard.  Pulmonary/Chest: Effort normal and breath sounds normal. No respiratory distress. She has no wheezes.   Abdominal: Soft. Bowel sounds are normal. She exhibits no distension.   Chevron inc right and left ends opened.  Wet to dry dressing.  Mid chevron w staples CDI.    MAXWELL sites clean w no drainage.     Genitourinary:   Genitourinary Comments: Incont of urine   Musculoskeletal: Normal range of motion. She exhibits no edema.   Neurological: She is alert and oriented to person, place, and time.   Skin: Skin is warm and dry. Capillary refill takes 2 to 3 seconds.   Psychiatric: She has a normal mood and affect. Her behavior is normal.   Mentation slow.  Alert to person place and time.     Nursing note and vitals reviewed.      Laboratory:  Immunosuppressants         Stop Route Frequency     tacrolimus capsule 2 mg      -- Oral 2 times daily     mycophenolate capsule 1,000 mg      -- Oral 2 times daily        CBC:     Recent Labs  Lab 01/01/18  1155   WBC 13.66*   RBC 2.50*   HGB 7.9*   HCT 23.2*   PLT 53*   MCV 93   MCH 31.6*   MCHC 34.1     CMP:     Recent Labs  Lab  01/01/18  0400   *   CALCIUM 8.2*   ALBUMIN 2.7*   PROT 4.7*      K 3.4*   CO2 26      BUN 36*   CREATININE 1.7*   ALKPHOS 92   ALT 80*   AST 23   BILITOT 2.3*     Tacrolimus Lvl   Date Value Ref Range Status   01/01/2018 7.3 5.0 - 15.0 ng/mL Final     Comment:     Testing performed by Liquid Chromatography-Tandem  Mass Spectrometry (LC-MS/MS).  This test was developed and its performance characteristics  determined by [facility], Department of Pathology  and Laboratory Medicine in a manner consistent with CLIA  requirements. It has not been cleared or approved by the [place]  Food and Drug Administration.  This test is used for clinical   purposes.  It should not be regarded as investigational or for  research.     12/31/2017 5.8 5.0 - 15.0 ng/mL Final     Comment:     Testing performed by Liquid Chromatography-Tandem  Mass Spectrometry (LC-MS/MS).  This test was developed and its performance characteristics  determined by [facility], Department of Pathology  and Laboratory Medicine in a manner consistent with CLIA  requirements. It has not been cleared or approved by the [place]  Food and Drug Administration.  This test is used for clinical   purposes.  It should not be regarded as investigational or for  research.     12/30/2017 7.0 5.0 - 15.0 ng/mL Final     Comment:     Testing performed by Liquid Chromatography-Tandem  Mass Spectrometry (LC-MS/MS).  This test was developed and its performance characteristics  determined by [facility], Department of Pathology  and Laboratory Medicine in a manner consistent with CLIA  requirements. It has not been cleared or approved by the [place]  Food and Drug Administration.  This test is used for clinical   purposes.  It should not be regarded as investigational or for  research.     12/29/2017 9.9 5.0 - 15.0 ng/mL Final     Comment:     Testing performed by Liquid Chromatography-Tandem  Mass Spectrometry (LC-MS/MS).  This test was  developed and its performance characteristics  determined by [facility], Department of Pathology  and Laboratory Medicine in a manner consistent with CLIA  requirements. It has not been cleared or approved by the [place]  Food and Drug Administration.  This test is used for clinical   purposes.  It should not be regarded as investigational or for  research.         Labs within the past 24 hours have been reviewed.    Diagnostic Results:  I have personally reviewed all pertinent imaging studies.    Assessment/Plan:     * Liver transplant 12/26/2017 for ETOH    - POD 1 US with elevated RIs of 1 and two perihepatic fluid collections.  Plan to repeat US tomorrow 1/2/17.  - LFTs improving.  - appetite fair. Prealbumin 25. Supplements ordered.  - enc IS, (+) BMs, enc OOB to chair.            Transplant wound dehiscence    - staples removed from right end of Chevron.  Lg hematoma evacuated.  Suture placed for oozing- ok to remove when healing.  WD care consulted for wd vac.  - left end of incision opened, med size hematoma removed.  Wd care to assess need for wd vac to this anna as well.    - Nursing cont wet to dry dressing daily until wd care can assess.          Physical deconditioning    -PT/OT following.            Enterococcus UTI    - (+) Urine cx on 12/22 for enterococcus.  - repeat urine cx with no growth.  - treat with amoxil 500 mg q24h starting 12/30.  Duration ?  - dc stevens catheter 12/30.  Pt incont of urine        At risk for opportunistic infections    - continue valcyte and bactrim for cmv/pcp prophylaxis.           DEREK (acute kidney injury)    - Cr improving daily 1.9 to 1.8 to 1.7.  Diuresing well.  Plan for lasix 60 mg today x1.     - nephrology following.  CRRT last on 12/28.            Long-term use of immunosuppressant medication    - continue prograf, cellcept and prednisone. Monitor labs daily and adjust dose accordingly for a therapeutic level.           Prophylactic immunotherapy    - see  long term IS.           Adrenal cortical steroids causing adverse effect in therapeutic use    - insulin off.  apprec Endo recs.          Essential hypertension    - systolic bp 110s-135. Dc'd nifedipine 12/30. Monitor.           Anemia of chronic disease     - H&H trending down.  Type and screen current.  - plt x1 given today also for ozzing of incision.              VTE Risk Mitigation         Ordered     heparin (porcine) injection 5,000 Units  Every 8 hours     Route:  Subcutaneous        12/26/17 1425     Place sequential compression device  Until discontinued      12/26/17 1425     High Risk of VTE  Once      12/26/17 1425     Place FABIOLA hose  Until discontinued      11/23/17 2305          The patients clinical status was discussed at multidisplinary rounds, involving transplant surgery, transplant medicine, pharmacy, nursing, nutrition, and social work    Discharge Planning:  No Patient Care Coordination Note on file.      Renetta Mcconnell NP  Liver Transplant  [facility]

## 2018-01-01 NOTE — ASSESSMENT & PLAN NOTE
- staples removed from right end of Chevron.  Lg hematoma evacuated.  Suture placed for oozing- ok to remove when healing.  WD care consulted for wd vac.  - left end of incision opened, med size hematoma removed.  Wd care to assess need for wd vac to this anna as well.    - Nursing cont wet to dry dressing daily until wd care can assess.

## 2018-01-01 NOTE — ASSESSMENT & PLAN NOTE
- Cr improving daily 1.9 to 1.8 to 1.7.  Diuresing well.  Plan for lasix 60 mg today x1.     - nephrology following.  CRRT last on 12/28.

## 2018-01-01 NOTE — PLAN OF CARE
Disoriented to time and situation, delayed responses. Chevron incision with pressure dressings CDI. Pt denies pain at this time. Fecal incontinence with large liquid stool-C.diff negative. Fall precautions maintained and BA on. See flowsheet for assessment findings.

## 2018-01-01 NOTE — ASSESSMENT & PLAN NOTE
- (+) Urine cx on 12/22 for enterococcus.  - repeat urine cx with no growth.  - treat with amoxil 500 mg q24h starting 12/30.  Duration ?  - dc stevens catheter 12/30.  Pt incont of urine

## 2018-01-01 NOTE — ASSESSMENT & PLAN NOTE
- POD 1 US with elevated RIs of 1 and two perihepatic fluid collections.  Plan to repeat US tomorrow 1/2/17.  - LFTs improving.  - appetite fair. Prealbumin 25. Supplements ordered.  - enc IS, (+) BMs, enc OOB to chair.

## 2018-01-02 PROBLEM — T81.31XA TRANSPLANT WOUND DEHISCENCE: Status: ACTIVE | Noted: 2018-01-02

## 2018-01-02 PROBLEM — N17.9 AKI (ACUTE KIDNEY INJURY): Status: ACTIVE | Noted: 2018-01-02

## 2018-01-02 PROBLEM — R53.81 PHYSICAL DECONDITIONING: Status: ACTIVE | Noted: 2018-01-02

## 2018-01-02 PROBLEM — T81.89XA DELAYED SURGICAL WOUND HEALING: Status: ACTIVE | Noted: 2018-01-02

## 2018-01-02 PROBLEM — D84.9 IMMUNOSUPPRESSION: Status: RESOLVED | Noted: 2018-01-02 | Resolved: 2017-12-30

## 2018-01-02 LAB
ALBUMIN SERPL BCP-MCNC: 2.6 G/DL
ALP SERPL-CCNC: 90 U/L
ALT SERPL W/O P-5'-P-CCNC: 76 U/L
ANION GAP SERPL CALC-SCNC: 7 MMOL/L
ANISOCYTOSIS BLD QL SMEAR: ABNORMAL
AST SERPL-CCNC: 24 U/L
BASO STIPL BLD QL SMEAR: ABNORMAL
BASOPHILS # BLD AUTO: 0.01 K/UL
BASOPHILS NFR BLD: 0.1 %
BILIRUB SERPL-MCNC: 2 MG/DL
BLD PROD TYP BPU: NORMAL
BLOOD UNIT EXPIRATION DATE: NORMAL
BLOOD UNIT TYPE CODE: 600
BLOOD UNIT TYPE: NORMAL
BUN SERPL-MCNC: 34 MG/DL
CALCIUM SERPL-MCNC: 8 MG/DL
CHLORIDE SERPL-SCNC: 102 MMOL/L
CO2 SERPL-SCNC: 27 MMOL/L
CODING SYSTEM: NORMAL
CREAT SERPL-MCNC: 1.3 MG/DL
DIFFERENTIAL METHOD: ABNORMAL
DISPENSE STATUS: NORMAL
EOSINOPHIL # BLD AUTO: 0.1 K/UL
EOSINOPHIL NFR BLD: 1.2 %
ERYTHROCYTE [DISTWIDTH] IN BLOOD BY AUTOMATED COUNT: 17 %
EST. GFR  (AFRICAN AMERICAN): 53.7 ML/MIN/1.73 M^2
EST. GFR  (NON AFRICAN AMERICAN): 46.6 ML/MIN/1.73 M^2
GLUCOSE SERPL-MCNC: 131 MG/DL
HCT VFR BLD AUTO: 19.6 %
HGB BLD-MCNC: 6.5 G/DL
HYPOCHROMIA BLD QL SMEAR: ABNORMAL
IMM GRANULOCYTES # BLD AUTO: 0.11 K/UL
IMM GRANULOCYTES NFR BLD AUTO: 1.1 %
INR PPP: 1
LYMPHOCYTES # BLD AUTO: 1.3 K/UL
LYMPHOCYTES NFR BLD: 13 %
MAGNESIUM SERPL-MCNC: 1.9 MG/DL
MCH RBC QN AUTO: 30.7 PG
MCHC RBC AUTO-ENTMCNC: 33.2 G/DL
MCV RBC AUTO: 93 FL
MONOCYTES # BLD AUTO: 0.5 K/UL
MONOCYTES NFR BLD: 5.4 %
NEUTROPHILS # BLD AUTO: 7.6 K/UL
NEUTROPHILS NFR BLD: 79.2 %
NRBC BLD-RTO: 0 /100 WBC
PHOSPHATE SERPL-MCNC: 3.6 MG/DL
PLATELET # BLD AUTO: 98 K/UL
PLATELET BLD QL SMEAR: ABNORMAL
PMV BLD AUTO: 11.8 FL
POLYCHROMASIA BLD QL SMEAR: ABNORMAL
POTASSIUM SERPL-SCNC: 3.1 MMOL/L
PROT SERPL-MCNC: 4.7 G/DL
PROTHROMBIN TIME: 10.5 SEC
RBC # BLD AUTO: 2.12 M/UL
SODIUM SERPL-SCNC: 136 MMOL/L
TACROLIMUS BLD-MCNC: 8.1 NG/ML
TRANS ERYTHROCYTES VOL PATIENT: NORMAL ML
WBC # BLD AUTO: 9.65 K/UL

## 2018-01-02 PROCEDURE — 97607 NEG PRS WND THR NDME<=50SQCM: CPT

## 2018-01-02 PROCEDURE — 81300000 HC LIVER ACQUISITION CHARGE

## 2018-01-02 PROCEDURE — 81300031 HC LIVER TRANSPORT, FLIGHT WITHIN 301-700 MILES

## 2018-01-02 PROCEDURE — 63600175 PHARM REV CODE 636 W HCPCS: Performed by: NURSE PRACTITIONER

## 2018-01-02 PROCEDURE — P9021 RED BLOOD CELLS UNIT: HCPCS

## 2018-01-02 PROCEDURE — 25000003 PHARM REV CODE 250: Performed by: STUDENT IN AN ORGANIZED HEALTH CARE EDUCATION/TRAINING PROGRAM

## 2018-01-02 PROCEDURE — 99233 SBSQ HOSP IP/OBS HIGH 50: CPT | Mod: ,,, | Performed by: INTERNAL MEDICINE

## 2018-01-02 PROCEDURE — 63600175 PHARM REV CODE 636 W HCPCS: Performed by: TRANSPLANT SURGERY

## 2018-01-02 PROCEDURE — 63600175 PHARM REV CODE 636 W HCPCS: Performed by: STUDENT IN AN ORGANIZED HEALTH CARE EDUCATION/TRAINING PROGRAM

## 2018-01-02 PROCEDURE — 85610 PROTHROMBIN TIME: CPT

## 2018-01-02 PROCEDURE — 80197 ASSAY OF TACROLIMUS: CPT

## 2018-01-02 PROCEDURE — 20600001 HC STEP DOWN PRIVATE ROOM

## 2018-01-02 PROCEDURE — 85025 COMPLETE CBC W/AUTO DIFF WBC: CPT

## 2018-01-02 PROCEDURE — 83735 ASSAY OF MAGNESIUM: CPT

## 2018-01-02 PROCEDURE — P9016 RBC LEUKOCYTES REDUCED: HCPCS

## 2018-01-02 PROCEDURE — 25000003 PHARM REV CODE 250: Performed by: NURSE PRACTITIONER

## 2018-01-02 PROCEDURE — 36430 TRANSFUSION BLD/BLD COMPNT: CPT

## 2018-01-02 PROCEDURE — 99233 SBSQ HOSP IP/OBS HIGH 50: CPT | Mod: 24,,, | Performed by: NURSE PRACTITIONER

## 2018-01-02 PROCEDURE — 80053 COMPREHEN METABOLIC PANEL: CPT

## 2018-01-02 PROCEDURE — 84100 ASSAY OF PHOSPHORUS: CPT

## 2018-01-02 RX ORDER — VALGANCICLOVIR 450 MG/1
450 TABLET, FILM COATED ORAL DAILY
Status: DISCONTINUED | OUTPATIENT
Start: 2018-01-02 | End: 2018-01-05 | Stop reason: HOSPADM

## 2018-01-02 RX ORDER — FUROSEMIDE 10 MG/ML
60 INJECTION INTRAMUSCULAR; INTRAVENOUS ONCE
Status: COMPLETED | OUTPATIENT
Start: 2018-01-02 | End: 2018-01-02

## 2018-01-02 RX ORDER — SULFAMETHOXAZOLE AND TRIMETHOPRIM 400; 80 MG/1; MG/1
1 TABLET ORAL DAILY
Status: DISCONTINUED | OUTPATIENT
Start: 2018-01-02 | End: 2018-01-05 | Stop reason: HOSPADM

## 2018-01-02 RX ORDER — HYDROCODONE BITARTRATE AND ACETAMINOPHEN 500; 5 MG/1; MG/1
TABLET ORAL
Status: DISCONTINUED | OUTPATIENT
Start: 2018-01-02 | End: 2018-01-05 | Stop reason: HOSPADM

## 2018-01-02 RX ORDER — TACROLIMUS 1 MG/1
1 CAPSULE ORAL 2 TIMES DAILY
Status: DISCONTINUED | OUTPATIENT
Start: 2018-01-02 | End: 2018-01-03

## 2018-01-02 RX ADMIN — AMOXICILLIN 500 MG: 500 CAPSULE ORAL at 09:01

## 2018-01-02 RX ADMIN — FUROSEMIDE 60 MG: 10 INJECTION, SOLUTION INTRAMUSCULAR; INTRAVENOUS at 10:01

## 2018-01-02 RX ADMIN — TACROLIMUS 1 MG: 1 CAPSULE ORAL at 05:01

## 2018-01-02 RX ADMIN — HEPARIN SODIUM 5000 UNITS: 5000 INJECTION, SOLUTION INTRAVENOUS; SUBCUTANEOUS at 09:01

## 2018-01-02 RX ADMIN — PANTOPRAZOLE SODIUM 40 MG: 40 TABLET, DELAYED RELEASE ORAL at 09:01

## 2018-01-02 RX ADMIN — HEPARIN SODIUM 5000 UNITS: 5000 INJECTION, SOLUTION INTRAVENOUS; SUBCUTANEOUS at 02:01

## 2018-01-02 RX ADMIN — MYCOPHENOLATE MOFETIL 1000 MG: 250 CAPSULE ORAL at 09:01

## 2018-01-02 RX ADMIN — MYCOPHENOLATE MOFETIL 1000 MG: 250 CAPSULE ORAL at 08:01

## 2018-01-02 RX ADMIN — HYDROMORPHONE HYDROCHLORIDE 4 MG: 2 TABLET ORAL at 02:01

## 2018-01-02 RX ADMIN — HEPARIN SODIUM 5000 UNITS: 5000 INJECTION, SOLUTION INTRAVENOUS; SUBCUTANEOUS at 05:01

## 2018-01-02 RX ADMIN — POSACONAZOLE 300 MG: 100 TABLET, COATED ORAL at 09:01

## 2018-01-02 RX ADMIN — FAMOTIDINE 20 MG: 20 TABLET, FILM COATED ORAL at 09:01

## 2018-01-02 RX ADMIN — SULFAMETHOXAZOLE AND TRIMETHOPRIM 1 TABLET: 400; 80 TABLET ORAL at 10:01

## 2018-01-02 RX ADMIN — PREDNISONE 20 MG: 10 TABLET ORAL at 09:01

## 2018-01-02 RX ADMIN — QUETIAPINE FUMARATE 25 MG: 25 TABLET, FILM COATED ORAL at 08:01

## 2018-01-02 RX ADMIN — VALGANCICLOVIR 450 MG: 450 TABLET, FILM COATED ORAL at 10:01

## 2018-01-02 RX ADMIN — TACROLIMUS 2 MG: 1 CAPSULE ORAL at 09:01

## 2018-01-02 RX ADMIN — HYDROMORPHONE HYDROCHLORIDE 2 MG: 2 TABLET ORAL at 10:01

## 2018-01-02 RX ADMIN — POTASSIUM CHLORIDE 40 MEQ: 750 CAPSULE, EXTENDED RELEASE ORAL at 09:01

## 2018-01-02 NOTE — ASSESSMENT & PLAN NOTE
Marcie Bazan is a 52 year old female with pass history of liver cirrhosis secondary to ETOH, who recently had liver transplanat on 12/26/17, which afterward presented with DEREK that mostly secondary to ischemic ATN after surgery, serum creatinine has been worsening and became anuric.     Plan:   - Serum creatinine baseline 0.7-0.9  - Some improvement of serum creatinine from 1.7-->1.3   - Patient making urine, may continue diuretics as needed   - Patient will not require dialysis   - Urine sediment presented with few RBCs (no dysmorphic cells), no casts   - Renal ultrasound with decrease perfusion and increase renal cortical echogenicity suggesting chronic renal disease.  No evidence of hydronephrosis, masses or nephrolysiasis   -Avoid nephrotoxic medications and renal dose medications  - Will sign off, if any questions please let us know.

## 2018-01-02 NOTE — PLAN OF CARE
Pt transported off unit via stretcher to [facility]. No acute distress noted.

## 2018-01-02 NOTE — PROGRESS NOTES
[facility]  Liver Transplant  Progress Note    Patient Name: Marcie Bazan  MRN: [id number]  Admission Date: 2017  Hospital Length of Stay: 40 days  Code Status: Full Code  Primary Care Provider: ODILIA Wall  Post-Operative Day: 7    ORGAN:   LIVER  Disease Etiology: Alcoholic Cirrhosis  Donor Type:    - Brain Death  CDC High Risk:   No  Donor CMV Status:   Donor CMV Status: Negative  Donor HBcAB:   Negative  Donor HCV Status:   Negative  Whole or Partial: Whole Liver  Biliary Anastomosis: End to End  Arterial Anatomy: Standard  Subjective:     History of Present Illness:  HPI:  52 year old female with PMHx of 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 Patient presented to [facility] with generalized weakness. Onset two months ago, progressively worsening. Was admitted to  11/10 for hyponatremia seen on labs in GI office. Discharged on 11/15 and returned on  with 1 week worsening weakness, malaise, lethargy, and decreased appetite. Denied fever/chills, abdominal pain, bright red or black tarry stools, hematemesis or other abnormal bleeding, confusion or disorientation, changes in urination, light-headedness, headache, changes in vision, dyspnea, chest pain or palpitations. Labs demonstrated Na 116 and MELD 32; patient admitted for hyponatremia. Nephrology consulted, recommended fluid restriction , holding diuretics, and prednisode taper that started  (decreased from 40mg QD to 20mg Qd.) Patient transferred to [facility] for revaluation of liver disease. Of note, paracentesis performed , removed 2L, negative for SBP.     Follows with Dr. Souza for cirrhosis. Since early 2017, patient's liver failure progressing indicated by worsening clinical signs (abdominal distention, weakness/fatigue) and MELD. In  mid-October, patient's PETH demonstrated EtOH use and she was denied candidacy for liver transplant. She was then enrolled in in-patient substance abuse program at [facility] where she has remained since early November; per patient, her last EtOH beverage was 11/01/2017. Denies history of GI bleed, SBP, renal disease, or other complications of cirrhosis. Liver biopsy 10/24 demonstrated stage 4 cirrhosis. Records reports EGD in August 2016 that showed small esophageal varices with severe gastritis. Colonoscopy 2015 with 2 polyps and internal hemorrhoids Started on prednisone 40mg QD on 10/25; was on spironolactone, has been held since 11/10 for hypoNa; on lactulose.    Ms. Bazan is now s/p OLTX 12/26/17 for ETOH cirrhosis and PERKINS (steroid induction, DBD, CMV D-/R+). Post op course with confusion and disorientation. Mental status improving. Currently she is taking seroquel night. Nephrology following. CRRT started 12/28.     Hospital Course:  Mental status improving with hepatic encephalopathy treatment. Started empiric ceftriaxone given leukocytosis in immunocompromised state. CMV PCR positive, for which ID was consulted, recommended repeating titers in 1 week. Seen by Psychiatry and  who determined she was moderate risk for Liver transplant. EGD/Cscope and mammogram ordered. Colonoscopy not performed due to poor prep, EGD revealed large varices which were banded. IR paracentesis ordered after patient started complaining of abdominal pain. Mammogram performed, IR paracentesis after INR Reversal negative for SBP.  As pt with low risk malignancy, it was decided not to proceed with a breast biopsy.  Patient accepted for Liver transplant. Listed with MELD 35. Patient was slightly confused and altered, which resolved with lactulose enemas.     Liver transplant 12/27/2017 (DBD, EBV D+/R+, CMV D-/R+, donor normal anatomy)     POD# 0 - Slow to arouse from sedation, not following commands, minimal vent setting  however unable to extubate due to mental status. Poor UOP.     POD#1 - Extubated, pt more alert and able to follow commands however confused and disoriented. Poor UOP 10-15 cc/hr. LFTs trending down , normalized INR, Liver US within normal limits. Thrombocytopenia, 1 pack plts given with line removal, transducer in RIJ  Developed hematoma, required holding pressure for 1.5hrs and another pack of plts.     POD#2 - Improve mental status AAO to person and place, not situation or time. Continues to have minimal UOP not responsive to lasix, Nephrology consulted, started CRRT. LFTs continue to trend down, INR normalized, plts 136     POD# 3 - UOP remaines 5cc/hr, tolerated CRRT for 10 hours, 2.5L UF. Mental status continues to clear up. AF, HDS, tolerating diet, OOB, pain controlled.    Interval history: no acute events overnight. MS improving. Pt more alert and oriented today. Continue seroquel nightly. Hematomas evacuated from right and left ends of chevron incision on 1/1/18. Wet to dry dressings placed and wound care consulted for w vac placement. H&H trended down to 6.5/19.6 today. Transfuse 2 units PRBCs. Lasix 60 mg IV ordered. LFTs improving. POD 7 US today with satisfactory results. Continue treatment with Amoxil for Enterococcus UTI. Plan for 7 day course. Cr improving daily, 1.3 from 1.7. Monitor.      Scheduled Meds:   amoxicillin  500 mg Oral Daily    famotidine  20 mg Oral QHS    heparin (porcine)  5,000 Units Subcutaneous Q8H    mycophenolate  1,000 mg Oral BID    pantoprazole  40 mg Oral Daily    posaconazole  300 mg Oral Daily    predniSONE  20 mg Oral Daily    QUEtiapine  25 mg Oral QHS    sulfamethoxazole-trimethoprim 400-80mg  1 tablet Oral Daily    tacrolimus  1 mg Oral BID    valGANciclovir  450 mg Oral Daily     Continuous Infusions:  PRN Meds:sodium chloride, sodium chloride, bisacodyl, hydrALAZINE, HYDROmorphone, HYDROmorphone, labetalol, ondansetron, phenyleph-shark jermaine oil-mo-pet,  sodium chloride 0.9%    Review of Systems   Constitutional: Positive for activity change, appetite change and fatigue. Negative for chills and fever.   HENT: Negative for congestion and facial swelling.    Eyes: Negative for pain, discharge and visual disturbance.   Respiratory: Negative for cough, chest tightness, shortness of breath and wheezing.    Cardiovascular: Negative for chest pain, palpitations and leg swelling.   Gastrointestinal: Positive for abdominal distention. Negative for abdominal pain, constipation, diarrhea, nausea and vomiting.   Endocrine: Negative.    Genitourinary: Positive for decreased urine volume. Negative for difficulty urinating and hematuria.   Musculoskeletal: Negative for back pain.   Skin: Positive for wound. Negative for pallor and rash.   Allergic/Immunologic: Positive for immunocompromised state.   Neurological: Negative for dizziness, tremors, weakness and light-headedness.   Hematological: Negative.    Psychiatric/Behavioral: Positive for confusion and dysphoric mood. Negative for agitation. The patient is not nervous/anxious.      Objective:     Vital Signs (Most Recent):  Temp: 98.1 °F (36.7 °C) (01/02/18 1020)  Pulse: 79 (01/02/18 1020)  Resp: 18 (01/02/18 1020)  BP: 138/72 (01/02/18 1020)  SpO2: 100 % (01/02/18 1020) Vital Signs (24h Range):  Temp:  [97.9 °F (36.6 °C)-98.6 °F (37 °C)] 98.1 °F (36.7 °C)  Pulse:  [73-85] 79  Resp:  [16-18] 18  SpO2:  [98 %-100 %] 100 %  BP: (115-144)/(63-76) 138/72     Weight: 68.5 kg (151 lb 0.2 oz)  Body mass index is 25.13 kg/m².    Intake/Output - Last 3 Shifts       12/31 0700 - 01/01 0659 01/01 0700 - 01/02 0659 01/02 0700 - 01/03 0659    P.O. 720 1080 750    Blood  207 250    Total Intake(mL/kg) 720 (10.5) 1287 (18.8) 1000 (14.6)    Urine (mL/kg/hr)   900 (1.9)    Emesis/NG output   0 (0)    Other   0 (0)    Stool   0 (0)    Total Output     900    Net +720 +1287 +100           Urine Occurrence 4 x 5 x 0 x    Stool Occurrence 8 x 5 x 1  x    Emesis Occurrence   0 x          Physical Exam   Constitutional: She is oriented to person, place, and time. She appears well-developed and well-nourished.   HENT:   Head: Normocephalic and atraumatic.   Eyes: EOM are normal. Pupils are equal, round, and reactive to light. No scleral icterus.   Neck: Normal range of motion. Neck supple. No JVD present.   Cardiovascular: Normal rate, regular rhythm and normal heart sounds.    No murmur heard.  Pulmonary/Chest: Effort normal and breath sounds normal. No respiratory distress. She has no wheezes.   Abdominal: Soft. Bowel sounds are normal. She exhibits no distension.   Chevron inc right and left ends opened.  Wet to dry dressing.  Mid chevron w staples CDI.    MAXWELL sites clean w no drainage.     Genitourinary:   Genitourinary Comments: Incont of urine/stool   Musculoskeletal: Normal range of motion. She exhibits no edema.   Neurological: She is alert and oriented to person, place, and time.   Skin: Skin is warm and dry. Capillary refill takes 2 to 3 seconds.   Psychiatric: She has a normal mood and affect. Her behavior is normal.   Mentation slow.  Alert to person place and time.     Nursing note and vitals reviewed.      Laboratory:  Immunosuppressants         Stop Route Frequency     tacrolimus capsule 1 mg      -- Oral 2 times daily     mycophenolate capsule 1,000 mg      -- Oral 2 times daily        CBC:   Recent Labs  Lab 01/02/18  0500   WBC 9.65   RBC 2.12*   HGB 6.5*   HCT 19.6*   PLT 98*   MCV 93   MCH 30.7   MCHC 33.2     CMP:   Recent Labs  Lab 01/02/18  0500   *   CALCIUM 8.0*   ALBUMIN 2.6*   PROT 4.7*      K 3.1*   CO2 27      BUN 34*   CREATININE 1.3   ALKPHOS 90   ALT 76*   AST 24   BILITOT 2.0*     Labs within the past 24 hours have been reviewed.    Diagnostic Results:  I have personally reviewed all pertinent imaging studies.    Assessment/Plan:     * Liver transplant 12/26/2017 for ETOH    - POD 1 US with elevated RIs of 1 and  two perihepatic fluid collections.  Repeat liver US today with satisfactory results.   - LFTs improving.  - appetite fair. Prealbumin 25. Supplements ordered.  - enc IS, (+) BMs, enc OOB to chair.   - dc central line 1/2/18.           DEREK (acute kidney injury)    - Cr improving daily 1.3 from 1.7.  Diuresing well.  Plan for lasix 60 mg today x 1.     - nephrology following.  CRRT last on 12/28.             Long-term use of immunosuppressant medication    - continue prograf, cellcept and prednisone. Monitor labs daily and adjust dose accordingly for a therapeutic level.           Prophylactic immunotherapy    - see long term IS.           At risk for opportunistic infections    - continue valcyte and bactrim for cmv/pcp prophylaxis.           Anemia of chronic disease     -H&H 6.5/19.6. Transfuse 2 units PRBCs.   - plt x 1 given 1/1/18 for oozing of incision.          Essential hypertension    - systolic bp 110s-135. Dc'd nifedipine 12/30. Monitor.           Adrenal cortical steroids causing adverse effect in therapeutic use    - insulin off.  apprec Endo recs.          Enterococcus UTI    - (+) Urine cx on 12/22 for enterococcus.  - repeat urine cx with no growth.  - treat with amoxil 500 mg q24h starting 12/30, 7 day course. End date 1/7/18.  - dc stevens catheter 12/30.  Pt incontinent of urine.         Transplant wound dehiscence    - staples removed from right end of Chevron.  Lg hematoma evacuated.  Suture placed for oozing- ok to remove when healing.  WD care consulted for wd vac.  - left end of incision opened, med size hematoma removed.  Wd care to assess need for wd vac.  - Nursing cont wet to dry dressing daily until wd care can assess.          Physical deconditioning    - PT/OT following.                VTE Risk Mitigation         Ordered     heparin (porcine) injection 5,000 Units  Every 8 hours     Route:  Subcutaneous        12/26/17 1425     Place sequential compression device  Until discontinued       12/26/17 1425     High Risk of VTE  Once      12/26/17 1425     Place FABIOLA hose  Until discontinued      11/23/17 2305          The patients clinical status was discussed at multidisplinary rounds, involving transplant surgery, transplant medicine, pharmacy, nursing, nutrition, and social work    Discharge Planning: not a candidate for dc at this time.       Radha Arellano NP  Liver Transplant  [facility]

## 2018-01-02 NOTE — PROGRESS NOTES
EDUCATION NOTE:    Met with Marcie Bazan and her caregivers to provide teaching re: immunosuppressant medications.  Reviewed medication section of the Liver Transplant Education book that was provided.  Emphasized the importance of compliance, role of the blue medication card, concerns for drug interactions, and process of obtaining refills.  Counseled regarding Prograf, Cellcept , prednisone, including directions for use, monitoring, how to handle missed doses, and side effects.  Ms. Bazan and her caregivers verbalized understanding and had the opportunity to ask questions.

## 2018-01-02 NOTE — PROGRESS NOTES
SW met with pt and pts daughter and sister to discuss dc plans and continuity of care.   Pt presents alert and oriented x 4 today with , answering appropriately.  Pt denies coping issues at this time.  SW provided caregiver education, length local stay at transplant lodging to pt and pts family.   SW provided education about medicaid benefits for home health SN and PT/OT as well.   Pt will need skilled nursing home health care, as patient has wound vac placed to incision.   SW waiting on mid-level provider to complete paperwork to send to [facility] for wound vac.  SW will investigate HH agencies that locally that will accept pts [facility] Medicaid plan for HH services.     Pts family with no other psychosocial concerns at this time.   SW remains available for all psychosocial support, resources, education and assist with all dc planning needs.

## 2018-01-02 NOTE — SUBJECTIVE & OBJECTIVE
Interval History:  Patient evaluated at bedside, feeling better, starting to make more urine. With 5 unmeasured voids.     Review of patient's allergies indicates:   Allergen Reactions    Ferrous sulfate Other (See Comments)     Patient states the pill makes her sick. She stated she would rather have a shot     Current Facility-Administered Medications   Medication Frequency    0.9%  NaCl infusion (for blood administration) Q24H PRN    0.9%  NaCl infusion (for blood administration) Q24H PRN    amoxicillin capsule 500 mg Daily    bisacodyl EC tablet 5 mg Daily PRN    famotidine tablet 20 mg QHS    heparin (porcine) injection 5,000 Units Q8H    hydrALAZINE injection 10 mg Q6H PRN    HYDROmorphone tablet 2 mg Q3H PRN    HYDROmorphone tablet 4 mg Q3H PRN    labetalol injection 10 mg Q4H PRN    mycophenolate capsule 1,000 mg BID    ondansetron disintegrating tablet 4 mg Q8H PRN    pantoprazole EC tablet 40 mg Daily    phenyleph-shark jermaine oil-mo-pet ointment 1 applicator QID PRN    posaconazole tablet 300 mg Daily    predniSONE tablet 20 mg Daily    QUEtiapine tablet 25 mg QHS    sodium chloride 0.9% flush 3 mL PRN    sulfamethoxazole-trimethoprim 400-80mg per tablet 1 tablet Daily    tacrolimus capsule 2 mg BID    valGANciclovir tablet 450 mg Daily       Objective:     Vital Signs (Most Recent):  Temp: 98.1 °F (36.7 °C) (01/02/18 1020)  Pulse: 79 (01/02/18 1020)  Resp: 18 (01/02/18 1020)  BP: 138/72 (01/02/18 1020)  SpO2: 100 % (01/02/18 1020)  O2 Device (Oxygen Therapy): room air (01/02/18 1020) Vital Signs (24h Range):  Temp:  [97.6 °F (36.4 °C)-98.6 °F (37 °C)] 98.1 °F (36.7 °C)  Pulse:  [73-85] 79  Resp:  [16-18] 18  SpO2:  [98 %-100 %] 100 %  BP: (115-144)/(63-76) 138/72     Weight: 68.5 kg (151 lb 0.2 oz) (01/02/18 0500)  Body mass index is 25.13 kg/m².  Body surface area is 1.77 meters squared.    I/O last 3 completed shifts:  In: 1467 [P.O.:1260; Blood:207]  Out: -     Physical Exam    Constitutional: She is oriented to person, place, and time. She appears well-developed and well-nourished. No distress.   HENT:   Head: Normocephalic and atraumatic.   Eyes: Scleral icterus is present.   Neck: Normal range of motion. Neck supple.   Cardiovascular: Normal rate.    No murmur heard.  Pulmonary/Chest: Effort normal. She has decreased breath sounds in the right lower field and the left lower field.   Abdominal: Soft. Bowel sounds are normal. She exhibits distension.   Musculoskeletal: Normal range of motion. She exhibits no edema or tenderness.   Neurological: She is alert and oriented to person, place, and time.   Skin: Skin is warm. She is not diaphoretic.   Vitals reviewed.      Significant Labs:  ABGs:   Recent Labs  Lab 12/28/17  0854   PH 7.459*   PCO2 22.6*   HCO3 16.0*   POCSATURATED 97   BE -8     BMP:   Recent Labs  Lab 01/02/18  0500   *      CO2 27   BUN 34*   CREATININE 1.3   CALCIUM 8.0*   MG 1.9     CBC:   Recent Labs  Lab 01/02/18  0500   WBC 9.65   RBC 2.12*   HGB 6.5*   HCT 19.6*   PLT 98*   MCV 93   MCH 30.7   MCHC 33.2     CMP:   Recent Labs  Lab 01/02/18  0500   *   CALCIUM 8.0*   ALBUMIN 2.6*   PROT 4.7*      K 3.1*   CO2 27      BUN 34*   CREATININE 1.3   ALKPHOS 90   ALT 76*   AST 24   BILITOT 2.0*     All labs within the past 24 hours have been reviewed.

## 2018-01-02 NOTE — ASSESSMENT & PLAN NOTE
- Cr improving daily 1.3 from 1.7.  Diuresing well.  Plan for lasix 60 mg today x 1.     - nephrology following.  CRRT last on 12/28.

## 2018-01-02 NOTE — ASSESSMENT & PLAN NOTE
- staples removed from right end of Chevron.  Lg hematoma evacuated.  Suture placed for oozing- ok to remove when healing.  WD care consulted for wd vac.  - left end of incision opened, med size hematoma removed.  Wd care to assess need for wd vac.  - Nursing cont wet to dry dressing daily until wd care can assess.

## 2018-01-02 NOTE — PROGRESS NOTES
[facility]  Nephrology  Progress Note    Patient Name: Marcie Bazan  MRN: [id number]  Admission Date: 11/23/2017  Hospital Length of Stay: 40 days  Attending Provider: Harry Delarosa MD   Primary Care Physician: ODILIA Wall  Principal Problem:S/P liver transplant    Subjective:     HPI: 52 year old female with PMHx of 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 Patient presented to [facility] with generalized weakness. Onset two months ago, progressively worsening. Was admitted to  11/10 for hyponatremia seen on labs in GI office. Discharged on 11/15 and returned on 11/20 with 1 week worsening weakness, malaise, lethargy, and decreased appetite. Denied fever/chills, abdominal pain, bright red or black tarry stools, hematemesis or other abnormal bleeding, confusion or disorientation, changes in urination, light-headedness, headache, changes in vision, dyspnea, chest pain or palpitations. Labs demonstrated Na 116 and MELD 32; patient admitted for hyponatremia. Nephrology consulted, recommended fluid restriction , holding diuretics, and prednisode taper that started 11/23 (decreased from 40mg QD to 20mg Qd.) Patient transferred to [facility] for revaluation of liver disease. Of note, paracentesis performed 11/22, removed 2L, negative for SBP. Liver biopsy 10/24 demonstrated stage 4 cirrhosis. Records reports EGD in August 2016 that showed small esophageal varices with severe gastritis. Patient accepted for Liver transplant. Had liver transplant on 12/26/17. No evident complications during surgery. Was making good amount of urine. On next day presented with DEREK were serum creatinine increased from 1.2 -->1.6 and urinary output started to decreased. Today sCr continued to trend upward to 2.1, serum bicarbonate decreasing to 15, anuric and  chest x ray present moderate edema. This is when nephrology was consulted.     Interval History:  Patient evaluated at bedside, feeling better, starting to make more urine. With 5 unmeasured voids.     Review of patient's allergies indicates:   Allergen Reactions    Ferrous sulfate Other (See Comments)     Patient states the pill makes her sick. She stated she would rather have a shot     Current Facility-Administered Medications   Medication Frequency    0.9%  NaCl infusion (for blood administration) Q24H PRN    0.9%  NaCl infusion (for blood administration) Q24H PRN    amoxicillin capsule 500 mg Daily    bisacodyl EC tablet 5 mg Daily PRN    famotidine tablet 20 mg QHS    heparin (porcine) injection 5,000 Units Q8H    hydrALAZINE injection 10 mg Q6H PRN    HYDROmorphone tablet 2 mg Q3H PRN    HYDROmorphone tablet 4 mg Q3H PRN    labetalol injection 10 mg Q4H PRN    mycophenolate capsule 1,000 mg BID    ondansetron disintegrating tablet 4 mg Q8H PRN    pantoprazole EC tablet 40 mg Daily    phenyleph-shark jermaine oil-mo-pet ointment 1 applicator QID PRN    posaconazole tablet 300 mg Daily    predniSONE tablet 20 mg Daily    QUEtiapine tablet 25 mg QHS    sodium chloride 0.9% flush 3 mL PRN    sulfamethoxazole-trimethoprim 400-80mg per tablet 1 tablet Daily    tacrolimus capsule 2 mg BID    valGANciclovir tablet 450 mg Daily       Objective:     Vital Signs (Most Recent):  Temp: 98.1 °F (36.7 °C) (01/02/18 1020)  Pulse: 79 (01/02/18 1020)  Resp: 18 (01/02/18 1020)  BP: 138/72 (01/02/18 1020)  SpO2: 100 % (01/02/18 1020)  O2 Device (Oxygen Therapy): room air (01/02/18 1020) Vital Signs (24h Range):  Temp:  [97.6 °F (36.4 °C)-98.6 °F (37 °C)] 98.1 °F (36.7 °C)  Pulse:  [73-85] 79  Resp:  [16-18] 18  SpO2:  [98 %-100 %] 100 %  BP: (115-144)/(63-76) 138/72     Weight: 68.5 kg (151 lb 0.2 oz) (01/02/18 0500)  Body mass index is 25.13 kg/m².  Body surface area is 1.77 meters squared.    I/O last 3  completed shifts:  In: 1467 [P.O.:1260; Blood:207]  Out: -     Physical Exam   Constitutional: She is oriented to person, place, and time. She appears well-developed and well-nourished. No distress.   HENT:   Head: Normocephalic and atraumatic.   Eyes: Scleral icterus is present.   Neck: Normal range of motion. Neck supple.   Cardiovascular: Normal rate.    No murmur heard.  Pulmonary/Chest: Effort normal. She has decreased breath sounds in the right lower field and the left lower field.   Abdominal: Soft. Bowel sounds are normal. She exhibits distension.   Musculoskeletal: Normal range of motion. She exhibits no edema or tenderness.   Neurological: She is alert and oriented to person, place, and time.   Skin: Skin is warm. She is not diaphoretic.   Vitals reviewed.      Significant Labs:  ABGs:   Recent Labs  Lab 12/28/17  0854   PH 7.459*   PCO2 22.6*   HCO3 16.0*   POCSATURATED 97   BE -8     BMP:   Recent Labs  Lab 01/02/18  0500   *      CO2 27   BUN 34*   CREATININE 1.3   CALCIUM 8.0*   MG 1.9     CBC:   Recent Labs  Lab 01/02/18  0500   WBC 9.65   RBC 2.12*   HGB 6.5*   HCT 19.6*   PLT 98*   MCV 93   MCH 30.7   MCHC 33.2     CMP:   Recent Labs  Lab 01/02/18  0500   *   CALCIUM 8.0*   ALBUMIN 2.6*   PROT 4.7*      K 3.1*   CO2 27      BUN 34*   CREATININE 1.3   ALKPHOS 90   ALT 76*   AST 24   BILITOT 2.0*     All labs within the past 24 hours have been reviewed.     Assessment/Plan:     DEREK (acute kidney injury)     Marcie Bazan is a 52 year old female with pass history of liver cirrhosis secondary to ETOH, who recently had liver transplanat on 12/26/17, which afterward presented with DEREK that mostly secondary to ischemic ATN after surgery, serum creatinine has been worsening and became anuric.     Plan:   - Serum creatinine baseline 0.7-0.9  - Some improvement of serum creatinine from 1.7-->1.3   - Patient making urine, may continue diuretics as needed   - Patient will not  require dialysis   - Urine sediment presented with few RBCs (no dysmorphic cells), no casts   - Renal ultrasound with decrease perfusion and increase renal cortical echogenicity suggesting chronic renal disease.  No evidence of hydronephrosis, masses or nephrolysiasis   -Avoid nephrotoxic medications and renal dose medications  - Will sign off, if any questions please let us know.           Vito Capellan  Nephrology  Fellow  [facility]    Pager [phone]

## 2018-01-02 NOTE — SUBJECTIVE & OBJECTIVE
Scheduled Meds:   amoxicillin  500 mg Oral Daily    famotidine  20 mg Oral QHS    heparin (porcine)  5,000 Units Subcutaneous Q8H    mycophenolate  1,000 mg Oral BID    pantoprazole  40 mg Oral Daily    posaconazole  300 mg Oral Daily    predniSONE  20 mg Oral Daily    QUEtiapine  25 mg Oral QHS    sulfamethoxazole-trimethoprim 400-80mg  1 tablet Oral Daily    tacrolimus  1 mg Oral BID    valGANciclovir  450 mg Oral Daily     Continuous Infusions:  PRN Meds:sodium chloride, sodium chloride, bisacodyl, hydrALAZINE, HYDROmorphone, HYDROmorphone, labetalol, ondansetron, phenyleph-shark jermaine oil-mo-pet, sodium chloride 0.9%    Review of Systems   Constitutional: Positive for activity change, appetite change and fatigue. Negative for chills and fever.   HENT: Negative for congestion and facial swelling.    Eyes: Negative for pain, discharge and visual disturbance.   Respiratory: Negative for cough, chest tightness, shortness of breath and wheezing.    Cardiovascular: Negative for chest pain, palpitations and leg swelling.   Gastrointestinal: Positive for abdominal distention. Negative for abdominal pain, constipation, diarrhea, nausea and vomiting.   Endocrine: Negative.    Genitourinary: Positive for decreased urine volume. Negative for difficulty urinating and hematuria.   Musculoskeletal: Negative for back pain.   Skin: Positive for wound. Negative for pallor and rash.   Allergic/Immunologic: Positive for immunocompromised state.   Neurological: Negative for dizziness, tremors, weakness and light-headedness.   Hematological: Negative.    Psychiatric/Behavioral: Positive for confusion and dysphoric mood. Negative for agitation. The patient is not nervous/anxious.      Objective:     Vital Signs (Most Recent):  Temp: 98.1 °F (36.7 °C) (01/02/18 1020)  Pulse: 79 (01/02/18 1020)  Resp: 18 (01/02/18 1020)  BP: 138/72 (01/02/18 1020)  SpO2: 100 % (01/02/18 1020) Vital Signs (24h Range):  Temp:  [97.9 °F (36.6  °C)-98.6 °F (37 °C)] 98.1 °F (36.7 °C)  Pulse:  [73-85] 79  Resp:  [16-18] 18  SpO2:  [98 %-100 %] 100 %  BP: (115-144)/(63-76) 138/72     Weight: 68.5 kg (151 lb 0.2 oz)  Body mass index is 25.13 kg/m².    Intake/Output - Last 3 Shifts       12/31 0700 - 01/01 0659 01/01 0700 - 01/02 0659 01/02 0700 - 01/03 0659    P.O. 720 1080 750    Blood  207 250    Total Intake(mL/kg) 720 (10.5) 1287 (18.8) 1000 (14.6)    Urine (mL/kg/hr)   900 (1.9)    Emesis/NG output   0 (0)    Other   0 (0)    Stool   0 (0)    Total Output     900    Net +720 +1287 +100           Urine Occurrence 4 x 5 x 0 x    Stool Occurrence 8 x 5 x 1 x    Emesis Occurrence   0 x          Physical Exam   Constitutional: She is oriented to person, place, and time. She appears well-developed and well-nourished.   HENT:   Head: Normocephalic and atraumatic.   Eyes: EOM are normal. Pupils are equal, round, and reactive to light. No scleral icterus.   Neck: Normal range of motion. Neck supple. No JVD present.   Cardiovascular: Normal rate, regular rhythm and normal heart sounds.    No murmur heard.  Pulmonary/Chest: Effort normal and breath sounds normal. No respiratory distress. She has no wheezes.   Abdominal: Soft. Bowel sounds are normal. She exhibits no distension.   Chevron inc right and left ends opened.  Wet to dry dressing.  Mid chevron w staples CDI.    MAXWELL sites clean w no drainage.     Genitourinary:   Genitourinary Comments: Incont of urine/stool   Musculoskeletal: Normal range of motion. She exhibits no edema.   Neurological: She is alert and oriented to person, place, and time.   Skin: Skin is warm and dry. Capillary refill takes 2 to 3 seconds.   Psychiatric: She has a normal mood and affect. Her behavior is normal.   Mentation slow.  Alert to person place and time.     Nursing note and vitals reviewed.      Laboratory:  Immunosuppressants         Stop Route Frequency     tacrolimus capsule 1 mg      -- Oral 2 times daily     mycophenolate  capsule 1,000 mg      -- Oral 2 times daily        CBC:   Recent Labs  Lab 01/02/18  0500   WBC 9.65   RBC 2.12*   HGB 6.5*   HCT 19.6*   PLT 98*   MCV 93   MCH 30.7   MCHC 33.2     CMP:   Recent Labs  Lab 01/02/18  0500   *   CALCIUM 8.0*   ALBUMIN 2.6*   PROT 4.7*      K 3.1*   CO2 27      BUN 34*   CREATININE 1.3   ALKPHOS 90   ALT 76*   AST 24   BILITOT 2.0*     Labs within the past 24 hours have been reviewed.    Diagnostic Results:  I have personally reviewed all pertinent imaging studies.

## 2018-01-02 NOTE — ASSESSMENT & PLAN NOTE
- (+) Urine cx on 12/22 for enterococcus.  - repeat urine cx with no growth.  - treat with amoxil 500 mg q24h starting 12/30, 7 day course. End date 1/7/18.  - dc stevens catheter 12/30.  Pt incontinent of urine.

## 2018-01-02 NOTE — PROGRESS NOTES
Met with patient, daughter, sister and friend,Ross.  Reviewed discharge book- My New Journey: Living Smart After My Liver Transplant.  Reviewed sections: First Steps, Prevention and Appointments.  Pharmacist will review Medication sections of the book.  Allowed time for questions and answers.  Asked patient to complete the review in the back of the book.

## 2018-01-02 NOTE — PROGRESS NOTES
Consulted to see for chevron wounds.   Was able to bridge both wounds together but noted wounds appear to connect medially under stapled bridge of tissue . Discussed with Renetta Mcconnell NP and she will discuss with team to see if bridge of tissue needs to be opened.      01/02/18 1418   Oxygen Therapy   SpO2 97 %   O2 Device (Oxygen Therapy) room air   ECG   Pulse 78       Incision/Site 12/26/17 1305 Right abdomen   Date First Assessed/Time First Assessed: 12/26/17 1305   Side: Right  Location: abdomen   Wound Image    Incision WDL ex   Dressing Appearance dried drainage;intact   Appearance moist;pink;sutures intact;staples intact;sinus tracts   Periwound Area ecchymotic   Drainage Characteristics/Odor serosanguineous   Drainage Amount moderate   Wound Length (cm) 2   Wound Width (cm) 13.8   Depth (cm) 3   Tunneling (depth (cm)/location) laterally 2.8 and medially 6cm   Wound Edges open   Cleansed W/ sterile normal saline   Irrigated W/ sterile normal saline   Dressing Dressing removed   Therapy Setting continuous therapy;125 mmHg   Pressure Setting 125 mmHg   Negative Pressure Wound Therapy Dressing foam, black   Sponges Inserted 2   Dressing Change Due 01/05/18       Incision/Site 01/02/18 1200 Left abdomen transverse horizontal   Date First Assessed/Time First Assessed: 01/02/18 1200   Present Prior to Hospital Arrival?: Yes  Side: Left  Location: abdomen  Orientation: transverse  Incision Type: horizontal  Closure Method: Staples   Wound Image    Wound Length (cm) 1.4   Wound Width (cm) 7.5   Depth (cm) 2.5   Tunneling (depth (cm)/location) medially 6.5cm   Wound Edges open   Cleansed W/ sterile normal saline   Therapy Setting continuous therapy;125 mmHg   Pressure Setting 125 mmHg   Negative Pressure Wound Therapy Dressing foam, black   Sponges Inserted 2   Black Sponges Inserted 2   Dressing Change Due 01/05/18   Vitals   /62     Plan for wound vac dressing to be changed on Friday 1/5/18  Brett Barrientos  GUIDO CWON  [id number]

## 2018-01-02 NOTE — PROGRESS NOTES
KARLA received wound vac orders from mid-level provider this afternoon.   KARLA faxed wound vac orders to [facility] at [phone] for intake.   KARLA will follow-up tomorrow about approval and delivery of the home wound vac.    KARLA remains available.

## 2018-01-02 NOTE — PROGRESS NOTES
RIJ Trialyis removed. Pressure held, and Vaseline gauze dressing applied. Pt tolerated well. Instructed pt to lie flat for 30 minutes, and to leave dressing in place for 24 hours. Will continue to monitor.

## 2018-01-02 NOTE — ASSESSMENT & PLAN NOTE
- POD 1 US with elevated RIs of 1 and two perihepatic fluid collections.  Repeat liver US today with satisfactory results.   - LFTs improving.  - appetite fair. Prealbumin 25. Supplements ordered.  - enc IS, (+) BMs, enc OOB to chair.   - dc central line 1/2/18.

## 2018-01-03 LAB
ALBUMIN SERPL BCP-MCNC: 3 G/DL
ALP SERPL-CCNC: 118 U/L
ALT SERPL W/O P-5'-P-CCNC: 80 U/L
ANION GAP SERPL CALC-SCNC: 8 MMOL/L
AST SERPL-CCNC: 29 U/L
BASOPHILS # BLD AUTO: 0.01 K/UL
BASOPHILS NFR BLD: 0.1 %
BILIRUB SERPL-MCNC: 2.8 MG/DL
BUN SERPL-MCNC: 35 MG/DL
CALCIUM SERPL-MCNC: 8.8 MG/DL
CHLORIDE SERPL-SCNC: 100 MMOL/L
CO2 SERPL-SCNC: 28 MMOL/L
CREAT SERPL-MCNC: 1.4 MG/DL
DIFFERENTIAL METHOD: ABNORMAL
EOSINOPHIL # BLD AUTO: 0.1 K/UL
EOSINOPHIL NFR BLD: 0.6 %
ERYTHROCYTE [DISTWIDTH] IN BLOOD BY AUTOMATED COUNT: 17.4 %
EST. GFR  (AFRICAN AMERICAN): 49.1 ML/MIN/1.73 M^2
EST. GFR  (NON AFRICAN AMERICAN): 42.6 ML/MIN/1.73 M^2
GLUCOSE SERPL-MCNC: 88 MG/DL
HCT VFR BLD AUTO: 28.3 %
HGB BLD-MCNC: 9.8 G/DL
IMM GRANULOCYTES # BLD AUTO: 0.2 K/UL
IMM GRANULOCYTES NFR BLD AUTO: 2.1 %
INR PPP: 0.9
LYMPHOCYTES # BLD AUTO: 1.3 K/UL
LYMPHOCYTES NFR BLD: 13.3 %
MAGNESIUM SERPL-MCNC: 1.5 MG/DL
MCH RBC QN AUTO: 31.3 PG
MCHC RBC AUTO-ENTMCNC: 34.6 G/DL
MCV RBC AUTO: 90 FL
MONOCYTES # BLD AUTO: 0.6 K/UL
MONOCYTES NFR BLD: 6.2 %
NEUTROPHILS # BLD AUTO: 7.3 K/UL
NEUTROPHILS NFR BLD: 77.7 %
NRBC BLD-RTO: 0 /100 WBC
PHOSPHATE SERPL-MCNC: 3.1 MG/DL
PLATELET # BLD AUTO: 86 K/UL
PMV BLD AUTO: 11.3 FL
POTASSIUM SERPL-SCNC: 3.8 MMOL/L
PROT SERPL-MCNC: 5.4 G/DL
PROTHROMBIN TIME: 10.2 SEC
RBC # BLD AUTO: 3.13 M/UL
SODIUM SERPL-SCNC: 136 MMOL/L
TACROLIMUS BLD-MCNC: 11.3 NG/ML
WBC # BLD AUTO: 9.41 K/UL

## 2018-01-03 PROCEDURE — 99233 SBSQ HOSP IP/OBS HIGH 50: CPT | Mod: 24,,, | Performed by: NURSE PRACTITIONER

## 2018-01-03 PROCEDURE — 25000003 PHARM REV CODE 250: Performed by: NURSE PRACTITIONER

## 2018-01-03 PROCEDURE — 63600175 PHARM REV CODE 636 W HCPCS: Performed by: NURSE PRACTITIONER

## 2018-01-03 PROCEDURE — 85025 COMPLETE CBC W/AUTO DIFF WBC: CPT

## 2018-01-03 PROCEDURE — 20600001 HC STEP DOWN PRIVATE ROOM

## 2018-01-03 PROCEDURE — 25000003 PHARM REV CODE 250: Performed by: STUDENT IN AN ORGANIZED HEALTH CARE EDUCATION/TRAINING PROGRAM

## 2018-01-03 PROCEDURE — 36415 COLL VENOUS BLD VENIPUNCTURE: CPT

## 2018-01-03 PROCEDURE — 97803 MED NUTRITION INDIV SUBSEQ: CPT | Performed by: DIETITIAN, REGISTERED

## 2018-01-03 PROCEDURE — 85610 PROTHROMBIN TIME: CPT

## 2018-01-03 PROCEDURE — 63600175 PHARM REV CODE 636 W HCPCS: Performed by: TRANSPLANT SURGERY

## 2018-01-03 PROCEDURE — 97116 GAIT TRAINING THERAPY: CPT

## 2018-01-03 PROCEDURE — 84100 ASSAY OF PHOSPHORUS: CPT

## 2018-01-03 PROCEDURE — 80053 COMPREHEN METABOLIC PANEL: CPT

## 2018-01-03 PROCEDURE — 83735 ASSAY OF MAGNESIUM: CPT

## 2018-01-03 PROCEDURE — 80197 ASSAY OF TACROLIMUS: CPT

## 2018-01-03 PROCEDURE — 63600175 PHARM REV CODE 636 W HCPCS: Performed by: STUDENT IN AN ORGANIZED HEALTH CARE EDUCATION/TRAINING PROGRAM

## 2018-01-03 RX ORDER — MAGNESIUM SULFATE HEPTAHYDRATE 40 MG/ML
2 INJECTION, SOLUTION INTRAVENOUS ONCE
Status: COMPLETED | OUTPATIENT
Start: 2018-01-03 | End: 2018-01-03

## 2018-01-03 RX ADMIN — HEPARIN SODIUM 5000 UNITS: 5000 INJECTION, SOLUTION INTRAVENOUS; SUBCUTANEOUS at 05:01

## 2018-01-03 RX ADMIN — AMOXICILLIN 500 MG: 500 CAPSULE ORAL at 03:01

## 2018-01-03 RX ADMIN — POSACONAZOLE 300 MG: 100 TABLET, COATED ORAL at 09:01

## 2018-01-03 RX ADMIN — PREDNISONE 20 MG: 10 TABLET ORAL at 09:01

## 2018-01-03 RX ADMIN — HEPARIN SODIUM 5000 UNITS: 5000 INJECTION, SOLUTION INTRAVENOUS; SUBCUTANEOUS at 08:01

## 2018-01-03 RX ADMIN — TRAZODONE HYDROCHLORIDE 25 MG: 50 TABLET ORAL at 08:01

## 2018-01-03 RX ADMIN — SULFAMETHOXAZOLE AND TRIMETHOPRIM 1 TABLET: 400; 80 TABLET ORAL at 09:01

## 2018-01-03 RX ADMIN — PANTOPRAZOLE SODIUM 40 MG: 40 TABLET, DELAYED RELEASE ORAL at 09:01

## 2018-01-03 RX ADMIN — MAGNESIUM SULFATE IN WATER 2 G: 40 INJECTION, SOLUTION INTRAVENOUS at 09:01

## 2018-01-03 RX ADMIN — HYDROMORPHONE HYDROCHLORIDE 2 MG: 2 TABLET ORAL at 09:01

## 2018-01-03 RX ADMIN — MYCOPHENOLATE MOFETIL 1000 MG: 250 CAPSULE ORAL at 09:01

## 2018-01-03 RX ADMIN — HEPARIN SODIUM 5000 UNITS: 5000 INJECTION, SOLUTION INTRAVENOUS; SUBCUTANEOUS at 03:01

## 2018-01-03 RX ADMIN — FAMOTIDINE 20 MG: 20 TABLET, FILM COATED ORAL at 08:01

## 2018-01-03 RX ADMIN — VALGANCICLOVIR 450 MG: 450 TABLET, FILM COATED ORAL at 09:01

## 2018-01-03 RX ADMIN — HYDROMORPHONE HYDROCHLORIDE 4 MG: 2 TABLET ORAL at 12:01

## 2018-01-03 RX ADMIN — MYCOPHENOLATE MOFETIL 1000 MG: 250 CAPSULE ORAL at 08:01

## 2018-01-03 RX ADMIN — TACROLIMUS 1 MG: 1 CAPSULE ORAL at 08:01

## 2018-01-03 NOTE — PROGRESS NOTES
[facility]  Liver Transplant  Progress Note    Patient Name: Marcie Bazan  MRN: [id number]  Admission Date: 2017  Hospital Length of Stay: 41 days  Code Status: Full Code  Primary Care Provider: ODILIA Wall  Post-Operative Day: 8    ORGAN:   LIVER  Disease Etiology: Alcoholic Cirrhosis  Donor Type:    - Brain Death  CDC High Risk:   No  Donor CMV Status:   Donor CMV Status: Negative  Donor HBcAB:   Negative  Donor HCV Status:   Negative  Whole or Partial: Whole Liver  Biliary Anastomosis: End to End  Arterial Anatomy: Standard  Subjective:     History of Present Illness:  HPI:  52 year old female with PMHx of 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 Patient presented to [facility] with generalized weakness. Onset two months ago, progressively worsening. Was admitted to  11/10 for hyponatremia seen on labs in GI office. Discharged on 11/15 and returned on  with 1 week worsening weakness, malaise, lethargy, and decreased appetite. Denied fever/chills, abdominal pain, bright red or black tarry stools, hematemesis or other abnormal bleeding, confusion or disorientation, changes in urination, light-headedness, headache, changes in vision, dyspnea, chest pain or palpitations. Labs demonstrated Na 116 and MELD 32; patient admitted for hyponatremia. Nephrology consulted, recommended fluid restriction , holding diuretics, and prednisode taper that started  (decreased from 40mg QD to 20mg Qd.) Patient transferred to [facility] for revaluation of liver disease. Of note, paracentesis performed , removed 2L, negative for SBP.     Follows with Dr. Souza for cirrhosis. Since early 2017, patient's liver failure progressing indicated by worsening clinical signs (abdominal distention, weakness/fatigue) and MELD. In  mid-October, patient's PETH demonstrated EtOH use and she was denied candidacy for liver transplant. She was then enrolled in in-patient substance abuse program at [facility] where she has remained since early November; per patient, her last EtOH beverage was 11/01/2017. Denies history of GI bleed, SBP, renal disease, or other complications of cirrhosis. Liver biopsy 10/24 demonstrated stage 4 cirrhosis. Records reports EGD in August 2016 that showed small esophageal varices with severe gastritis. Colonoscopy 2015 with 2 polyps and internal hemorrhoids Started on prednisone 40mg QD on 10/25; was on spironolactone, has been held since 11/10 for hypoNa; on lactulose.    Ms. Bazan is now s/p OLTX 12/26/17 for ETOH cirrhosis and PERKINS (steroid induction, DBD, CMV D-/R+). Post op course with confusion and disorientation. Mental status improving. Currently she is taking seroquel night. Nephrology following. CRRT started 12/28.     Hospital Course:  Mental status improving with hepatic encephalopathy treatment. Started empiric ceftriaxone given leukocytosis in immunocompromised state. CMV PCR positive, for which ID was consulted, recommended repeating titers in 1 week. Seen by Psychiatry and  who determined she was moderate risk for Liver transplant. EGD/Cscope and mammogram ordered. Colonoscopy not performed due to poor prep, EGD revealed large varices which were banded. IR paracentesis ordered after patient started complaining of abdominal pain. Mammogram performed, IR paracentesis after INR Reversal negative for SBP.  As pt with low risk malignancy, it was decided not to proceed with a breast biopsy.  Patient accepted for Liver transplant. Listed with MELD 35. Patient was slightly confused and altered, which resolved with lactulose enemas.     Liver transplant 12/27/2017 (DBD, EBV D+/R+, CMV D-/R+, donor normal anatomy)     POD# 0 - Slow to arouse from sedation, not following commands, minimal vent setting  however unable to extubate due to mental status. Poor UOP.     POD#1 - Extubated, pt more alert and able to follow commands however confused and disoriented. Poor UOP 10-15 cc/hr. LFTs trending down , normalized INR, Liver US within normal limits. Thrombocytopenia, 1 pack plts given with line removal, transducer in RIJ  Developed hematoma, required holding pressure for 1.5hrs and another pack of plts.     POD#2 - Improve mental status AAO to person and place, not situation or time. Continues to have minimal UOP not responsive to lasix, Nephrology consulted, started CRRT. LFTs continue to trend down, INR normalized, plts 136     POD# 3 - UOP remaines 5cc/hr, tolerated CRRT for 10 hours, 2.5L UF. Mental status continues to clear up. AF, HDS, tolerating diet, OOB, pain controlled.    Interval history: pt restless and anxious overnight. Reported not sleeping. Dc seroquel and add trazodone for sleep tonight. Pt alert and oriented but continues to have slowed mentation. Responded appropriately to blood transfusion x 2 yesterday. H&H stable. Hematomas evacuated from [place] on 1/1/18. Wound care consulted and wound vac placed yesterday. Cr trending up, 1.4. Prograf level elevated. Hold dose for now until level reviewed in AM. LFTs stable. Continue treatment with Amoxil for Enterococcus UTI. Plan for 7 day course, end date 1/7/18. Monitor.      Scheduled Meds:   amoxicillin  500 mg Oral Daily    famotidine  20 mg Oral QHS    heparin (porcine)  5,000 Units Subcutaneous Q8H    mycophenolate  1,000 mg Oral BID    pantoprazole  40 mg Oral Daily    posaconazole  300 mg Oral Daily    predniSONE  20 mg Oral Daily    sulfamethoxazole-trimethoprim 400-80mg  1 tablet Oral Daily    traZODone  25 mg Oral QHS    valGANciclovir  450 mg Oral Daily     Continuous Infusions:  PRN Meds:sodium chloride, sodium chloride, bisacodyl, hydrALAZINE, HYDROmorphone, HYDROmorphone, labetalol, ondansetron, phenyleph-shark jermaine oil-mo-pet,  sodium chloride 0.9%    Review of Systems   Constitutional: Positive for activity change, appetite change and fatigue. Negative for chills and fever.   HENT: Negative for congestion and facial swelling.    Eyes: Negative for pain, discharge and visual disturbance.   Respiratory: Negative for cough, chest tightness, shortness of breath and wheezing.    Cardiovascular: Negative for chest pain, palpitations and leg swelling.   Gastrointestinal: Positive for abdominal distention. Negative for abdominal pain, constipation, diarrhea, nausea and vomiting.   Endocrine: Negative.    Genitourinary: Positive for decreased urine volume. Negative for difficulty urinating and hematuria.   Musculoskeletal: Negative for back pain.   Skin: Positive for wound. Negative for pallor and rash.   Allergic/Immunologic: Positive for immunocompromised state.   Neurological: Negative for dizziness, tremors, weakness and light-headedness.   Hematological: Negative.    Psychiatric/Behavioral: Positive for confusion and dysphoric mood. Negative for agitation. The patient is not nervous/anxious.      Objective:     Vital Signs (Most Recent):  Temp: 98.5 °F (36.9 °C) (01/03/18 1108)  Pulse: 80 (01/03/18 1108)  Resp: 18 (01/03/18 1108)  BP: 139/71 (01/03/18 1108)  SpO2: 99 % (01/03/18 1108) Vital Signs (24h Range):  Temp:  [97.9 °F (36.6 °C)-98.5 °F (36.9 °C)] 98.5 °F (36.9 °C)  Pulse:  [73-84] 80  Resp:  [17-18] 18  SpO2:  [96 %-100 %] 99 %  BP: (120-157)/(62-80) 139/71     Weight: 68.5 kg (151 lb 0.2 oz)  Body mass index is 25.13 kg/m².    Intake/Output - Last 3 Shifts       01/01 0700 - 01/02 0659 01/02 0700 - 01/03 0659 01/03 0700 - 01/04 0659    P.O. 1080 1310     Blood 207 600     Total Intake(mL/kg) 1287 (18.8) 1910 (27.9)     Urine (mL/kg/hr)  1200 (0.7) 200 (0.4)    Emesis/NG output  0 (0)     Other  0 (0)     Stool  0 (0)     Total Output   1200 200    Net +1287 +710 -200           Urine Occurrence 5 x 2 x     Stool Occurrence 5 x 4 x      Emesis Occurrence  0 x           Physical Exam   Constitutional: She is oriented to person, place, and time. She appears well-developed and well-nourished.   HENT:   Head: Normocephalic and atraumatic.   Eyes: EOM are normal. Pupils are equal, round, and reactive to light. No scleral icterus.   Neck: Normal range of motion. Neck supple. No JVD present.   Cardiovascular: Normal rate, regular rhythm and normal heart sounds.    No murmur heard.  Pulmonary/Chest: Effort normal and breath sounds normal. No respiratory distress. She has no wheezes.   Abdominal: Soft. Bowel sounds are normal. She exhibits no distension.   Chevron inc right and left ends opened.  Wound vac intact. Mid chevron w tim CDI.    MAXWELL sites clean w no drainage.     Genitourinary:   Genitourinary Comments: Incont of urine/stool   Musculoskeletal: Normal range of motion. She exhibits no edema.   Neurological: She is alert and oriented to person, place, and time.   Skin: Skin is warm and dry. Capillary refill takes 2 to 3 seconds.   Psychiatric: She has a normal mood and affect. Her behavior is normal.   Mentation slow.  Alert to person place and time.     Nursing note and vitals reviewed.      Laboratory:  Immunosuppressants         Stop Route Frequency     mycophenolate capsule 1,000 mg      -- Oral 2 times daily        CBC:   Recent Labs  Lab 01/03/18  0405   WBC 9.41   RBC 3.13*   HGB 9.8*   HCT 28.3*   PLT 86*   MCV 90   MCH 31.3*   MCHC 34.6     CMP:   Recent Labs  Lab 01/03/18  0405   GLU 88   CALCIUM 8.8   ALBUMIN 3.0*   PROT 5.4*      K 3.8   CO2 28      BUN 35*   CREATININE 1.4   ALKPHOS 118   ALT 80*   AST 29   BILITOT 2.8*     Labs within the past 24 hours have been reviewed.    Diagnostic Results:  I have personally reviewed all pertinent imaging studies.    Assessment/Plan:     * Liver transplant 12/26/2017 for ETOH    - POD 1 US with elevated RIs of 1 and two perihepatic fluid collections.  Repeat liver US 1/2 with  satisfactory results.   - LFTs improving.  - appetite fair. Prealbumin 25. Supplements ordered.  - enc IS, (+) BMs, enc OOB to chair.   - dc central line 1/2/18.           DEREK (acute kidney injury)    - Cr 1.4.  Diuresed well with lasix 60 mg IV yesterday.  - hold off on lasix today.    - CRRT last on 12/28. No need for dialysis.  - nephrology signed off.             Long-term use of immunosuppressant medication    - continue prograf, cellcept and prednisone. Monitor labs daily and adjust dose accordingly for a therapeutic level.           Prophylactic immunotherapy    - see long term IS.           At risk for opportunistic infections    - continue valcyte and bactrim for cmv/pcp prophylaxis.           Anemia of chronic disease    - H&H stable.  - 2 units PRBCs on 1/2/18.   - plt x 1 given 1/1/18 for oozing of incision.          Essential hypertension    - Dc'd nifedipine 12/30. Monitor.           Adrenal cortical steroids causing adverse effect in therapeutic use    - insulin off.  apprec Endo recs.          Enterococcus UTI    - (+) Urine cx on 12/22 for enterococcus.  - repeat urine cx with no growth.  - treat with amoxil 500 mg q24h starting 12/30, 7 day course. End date 1/7/18.  - dc stevens catheter 12/30.          Transplant wound dehiscence    - staples removed from right end of Chevron.  Lg hematoma evacuated.  Suture placed for oozing- ok to remove when healing.  WD care consulted for wd vac.  - left end of incision opened, med size hematoma removed.  Wd care to assess need for wd vac.        Physical deconditioning    - PT/OT following.                VTE Risk Mitigation         Ordered     heparin (porcine) injection 5,000 Units  Every 8 hours     Route:  Subcutaneous        12/26/17 1425     Place sequential compression device  Until discontinued      12/26/17 1425     High Risk of VTE  Once      12/26/17 1425     Place FABIOLA hose  Until discontinued      11/23/17 0810          The patients clinical status  was discussed at multidisplinary rounds, involving transplant surgery, transplant medicine, pharmacy, nursing, nutrition, and social work    Discharge Planning: not a candidate for dc at this time.       Radha Arellano NP  Liver Transplant  [facility]

## 2018-01-03 NOTE — ASSESSMENT & PLAN NOTE
- POD 1 US with elevated RIs of 1 and two perihepatic fluid collections.  Repeat liver US 1/2 with satisfactory results.   - LFTs improving.  - appetite fair. Prealbumin 25. Supplements ordered.  - enc IS, (+) BMs, enc OOB to chair.   - dc central line 1/2/18.

## 2018-01-03 NOTE — ASSESSMENT & PLAN NOTE
Nutrition Diagnosis  Increased nutrient needs    Related to (etiology):   Protein for healing and maintenance    Signs and Symptoms (as evidenced by):   S/p OLTx     Interventions/Recommendations (treatment strategy):  See recs    Nutrition Diagnosis Status:   Continues

## 2018-01-03 NOTE — PLAN OF CARE
Problem: Patient Care Overview  Goal: Plan of Care Review  Outcome: Ongoing (interventions implemented as appropriate)  Pt AAO X 3 but increasingly anxious overnight; pt did not sleep overnight. VSS, see flowsheet for further assessment data.    POD # 7. Liver U/S completed, results unremarkable per MD notes. Chevron incision now connected to wound vac; scant amount of SS drainage overnight. Pt was unable to pull self meds w/o RN assistance; Pt self med education reinforced.    Pt up with standby assistance. No new skin breakdown noted. Previously documented redness to sacrum remains unchanged; barrier cream and preparation H applied prn by PCT.    I/Os monitored; 2UM UO and 2 BMs overnight.    Fall precautions in place; pt remains free of injury at this time. Daily labs monitored. No acute events overnight.

## 2018-01-03 NOTE — DISCHARGE SUMMARY
[facility]  Liver Transplant  Discharge Summary      Patient Name: Marcie Bazan  MRN: [id number]  Admission Date: 2017  Hospital Length of Stay: 41 days  Discharge Date and Time:  2018 1:08 PM  Attending Physician: Jarrod Regan MD   Discharging Provider: Radha Arellano NP  Primary Care Provider: ODILIA Wall  Post-Operative Day: 8     ORGAN:   LIVER  Disease Etiology: Alcoholic Cirrhosis  Donor Type:    - Brain Death  CDC High Risk:   No  Donor CMV Status:   Donor CMV Status: Negative  Donor HBcAB:   Negative  Donor HCV Status:   Negative  Whole or Partial: Whole Liver  Biliary Anastomosis: End to End  Arterial Anatomy: Standard    HPI: Ms. Marcie Bazan is a 52 year old female with PMH of essential HTN and ESLD secondary ETOH cirrhosis and PERKINS [place] [facility] [place] [facility] for evaluation of decompensated liver cirrhosis. She presented to [facility] with generalized weakness. She progressively worsened over past couple months returning to clinic for weakness, malaise, lethargy and decreased appetite. She required admit for treatment of hyponatremia. The patient required frequent paracenteses with the last on 17 with 4600 cc removed, wbc 362 segs 62%.    Ms. Bazan is now s/p  - Brain Death liver transplant on 2017 (Liver) for Alcoholic Cirrhosis.  This patient will follow the Steroid Induction protocol.  This patients immunosuppression will include a steroid taper over 6 weeks, Cellcept for 12 weeks and Prograf maintenance.  Opportunistic infection prophylaxis will include Valcyte for 3 months (CMV D- R+), Bactrim for 6 months, and nystatin.    Post op course with confusion and disorientation. She was taking seroquel nightly. Mentation has improved.  Seroquel dc'd and Trazodone added for sleep.    Nephrology consulted for DEREK and decreased uop. Pt received CRRT once on . DEREK began improving with lasix daily.  She diuresed well. Last dose of lasix 60 mg IV on 1/2/18. Cr up slightly w high Prograf level.  Cr improved to 1.0 on discharge.       Hematoma noted along chevron incision. Large hematoma evacuated on the right end of the incision and a medium sized hematoma evacuated on the left by the transplant fellow on 1/1/18. Wet to dry dressings placed until bleeding controlled.  Wound  vac placed to bridge both ends of the incision.   H&H decreased POD #6 to 6.5/19.6. She responded appropriately to 2 units PRBCs transfused 1/1/18.  CBC stable on discharge.     Of note, urine culture on 12/22 (prior to txp) positive for Enterococcus UTI. Began treatment with Amoxil on 12/30. Plan for 7 day course of treatment, end date 1/7/18.     LFTs on discharge up very slightly (AST 30-31, ALT 83-99).  T bili improved to 2.1.  Patient will have labs over the weekend.  She will f/u in clinic on Tuesday.  Discharge instructions reviewed w patient and CG per Nursing and Pharmacist.  Pt and CG verbalize understanding and are in agreement w discharge to apt today.  Wd vac replaced per wound care today (1/5/18).       Final Active Diagnoses:    Diagnosis Date Noted POA    PRINCIPAL PROBLEM:  Liver transplant 12/26/2017 for ETOH [Z94.4] 12/26/2017 Not Applicable    DEREK (acute kidney injury) [N17.9] 01/02/2018 Yes    Long-term use of immunosuppressant medication [Z79.899] 12/26/2017 Not Applicable     Chronic    Prophylactic immunotherapy [Z29.8] 12/26/2017 Not Applicable    At risk for opportunistic infections [Z91.89] 12/30/2017 No    Anemia of chronic disease [D63.8] 08/27/2015 Yes    Essential hypertension [I10] 09/11/2017 Yes     Chronic    Adrenal cortical steroids causing adverse effect in therapeutic use [T38.0X5A] 12/26/2017 Yes    Enterococcus UTI [N39.0, B95.2] 12/30/2017 No    Physical deconditioning [R53.81] 01/02/2018 Yes    Dehiscence of surgical wound following transplant [T81.31XA] 01/02/2018 Yes    Delayed surgical  wound healing [T81.89XA] 01/02/2018 Yes      Problems Resolved During this Admission:    Diagnosis Date Noted Date Resolved POA    Immunosuppressed status [D89.9] 11/27/2017 12/30/2017 Yes    Cytomegalovirus (CMV) viremia [B25.9] 11/25/2017 12/30/2017 Yes    Ascites [R18.8] 09/11/2017 12/30/2017 Yes    Hypomagnesemia [E83.42] 10/19/2017 12/30/2017 Yes    Immunosuppression [D89.9] 01/02/2018 12/30/2017 Yes    Decompensated hepatic cirrhosis [K72.90] 12/29/2017 12/30/2017 Yes    Hyperglycemia [R73.9] 12/26/2017 12/30/2017 No    Organ transplant candidate [Z76.82]  12/26/2017 Not Applicable    Alcoholic cirrhosis of liver with ascites [K70.31] 11/26/2017 12/26/2017 Yes    Coagulopathy [D68.9] 11/26/2017 12/26/2017 Yes    Hyponatremia [E87.1] 11/20/2017 12/26/2017 Yes    Hepatic encephalopathy [K72.90] 10/20/2017 12/26/2017 Yes    Severe protein-calorie malnutrition [E43] 10/19/2017 12/30/2017 Yes    Obesity (BMI 30-39.9) [E66.9] 01/05/2016 12/27/2017 Yes    NAFLD (nonalcoholic fatty liver disease) [K76.0] 08/27/2015 12/30/2017 Yes       Consults         Status Ordering Provider     Inpatient consult to Critical Care Medicine  Once     Provider:  (Not yet assigned)    Completed LANIE LO     Inpatient consult to Dietary  Once     Provider:  (Not yet assigned)    Completed AMANDA MAHONEY     Inpatient consult to Dietary  Once     Provider:  (Not yet assigned)    Completed STEFFANIE DAWSON     Inpatient consult to Endocrinology  Once     Provider:  (Not yet assigned)    Completed EDINSON CHEEK     Inpatient consult to Hepatology  Once     Provider:  (Not yet assigned)    Completed RAH GRANADOS     Inpatient consult to Infectious Diseases  Once     Provider:  (Not yet assigned)    Completed RACHID SHELTON     Inpatient consult to Infectious Diseases  Once     Provider:  (Not yet assigned)    Completed STEFFANIE DAWSON     Inpatient consult to Nephrology  Once     Provider:   Giovani Adkins MD    Completed HUSSAIN BOWEN     Inpatient consult to Psychiatry  Once     Provider:  (Not yet assigned)    Completed AMANDA MAHONEY     Inpatient consult to Registered Dietitian/Nutritionist  Once     Provider:  (Not yet assigned)    Completed BUDDY OLIVA     Inpatient consult to Registered Dietitian/Nutritionist  Once     Provider:  (Not yet assigned)    Completed EDINSON CHEEK            Significant Diagnostic Studies: Labs:   CMP   Recent Labs  Lab 01/04/18  0358 01/05/18  0403   * 138   K 3.3* 3.4*    105   CO2 23 24   GLU 96 92   BUN 28* 22*   CREATININE 0.9 1.0   CALCIUM 8.4* 8.4*   PROT 5.1* 5.2*   ALBUMIN 2.7* 2.6*   BILITOT 2.2* 2.1*   ALKPHOS 153* 191*   AST 30 31   ALT 83* 99*   ANIONGAP 9 9   ESTGFRAFRICA >60.0 >60.0   EGFRNONAA >60.0 >60.0   CBC   Recent Labs  Lab 01/04/18  0358 01/05/18  0403   WBC 8.11 7.17   HGB 9.6* 9.4*   HCT 28.0* 28.6*   * 147*   , INR   Lab Results   Component Value Date    INR 0.9 01/05/2018    INR 0.9 01/04/2018    INR 0.9 01/03/2018   All labs within the past 24 hours have been reviewed    Microbiology:   Blood Culture   Lab Results   Component Value Date    LABBLOO No growth after 5 days. 12/21/2017      Urine Culture    Lab Results   Component Value Date    LABURIN No growth 12/25/2017     Radiology: X-Ray: CXR: X-Ray Chest 1 View (CXR):   Results for orders placed or performed during the hospital encounter of 11/23/17   X-Ray Chest 1 View    Narrative     AP chest radiograph.      Comparison: 11/20/17    Results: The lung volume appears adequate.  No parenchymal or pleural abnormality is seen.  The cardiac silhouette  and pulmonary vascularity appear within normal limits.  The mediastinum is midline. .  No pneumothorax.  The osseous structures  appear normal  for age.    Impression     No acute intrathoracic process seen.      Electronically signed by: LACI CHAN MD  Date:     11/24/17  Time:    11:56       Ultrasound:   1/2/18 Satisfactory Doppler examination in this patient status post liver transplant with interval improvement in hepatic artery resistive indices.    Small peritransplant fluid collection.    Small right pleural effusion.    The patients clinical status was discussed at multidisplinary rounds, involving transplant surgery, transplant medicine, pharmacy, nursing, nutrition, and social work    Discharged Condition: fair    Disposition: to apt accompanied by CG    Follow Up: tomorrow w labs    Patient Instructions:   No discharge procedures on file.  Medications:  Reconciled Home Medications:   Current Discharge Medication List      START taking these medications    Details   aspirin (ECOTRIN) 81 MG EC tablet Take 1 tablet (81 mg total) by mouth once daily.  Qty: 30 tablet, Refills: 11      bisacodyl (DULCOLAX) 5 mg EC tablet Take 1-2 tablets (5-10 mg total) by mouth daily as needed for Constipation.  Qty: 30 tablet, Refills: 0      famotidine (PEPCID) 20 MG tablet Take 1 tablet (20 mg total) by mouth every evening.  Qty: 30 tablet, Refills: 11      HYDROmorphone (DILAUDID) 4 MG tablet Take 0.5-1 tablets (2-4 mg total) by mouth every 3 (three) hours as needed.  Qty: 40 tablet, Refills: 0      mycophenolate (CELLCEPT) 250 mg Cap Take 4 capsules (1,000 mg total) by mouth 2 (two) times daily.  Qty: 240 capsule, Refills: 2      posaconazole 100 mg TbEC tablet Take 3 tablets (300 mg total) by mouth once daily. Stop on 1/25/18  Qty: 90 tablet, Refills: 0    Comments: Following hospital mold prophylaxis protocol      predniSONE (DELTASONE) 10 MG tablet Take 20mg PO QD 1/1-1/7; 15mg PO QD 1/8-1/14; 10mg PO QD 1/15-1/21; 5mg PO QD 1/22-1/28; 2.5mg PO QD 1/29-2/4; Stop on 2/5/18  Qty: 60 tablet, Refills: 0      sulfamethoxazole-trimethoprim 400-80mg (BACTRIM,SEPTRA) 400-80 mg per tablet Take 1 tablet by mouth once daily. Stop on 6/24/18  Qty: 30 tablet, Refills: 5      tacrolimus (PROGRAF) 1 MG Cap Take 1  capsule (1 mg total) by mouth every 12 (twelve) hours.  Qty: 60 capsule, Refills: 11      valGANciclovir (VALCYTE) 450 mg Tab Take 1 tablet (450 mg total) by mouth once daily. Stop on 3/26/18  Qty: 30 tablet, Refills: 2         CONTINUE these medications which have NOT CHANGED    Details   ondansetron (ZOFRAN-ODT) 4 MG TbDL Take 1 tablet (4 mg total) by mouth every 8 (eight) hours as needed (nausea and vomiting).  Qty: 12 tablet, Refills: 0      potassium chloride SA (K-DUR,KLOR-CON) 20 MEQ tablet Taking only when taking lasix  Qty: 14 tablet, Refills: 0         STOP taking these medications       furosemide (LASIX) 40 MG tablet Comments:   Reason for Stopping:             Time spent caring for patient (Greater than 1/2 spent in direct face-to-face contact): > 30 minutes    Renetta Mcconnell NP  Liver Transplant  [facility]

## 2018-01-03 NOTE — PROGRESS NOTES
Pt AAO X 3 however, is becoming increasingly restless and anxious. VSS. NO pain meds given this shift; Seroquel given at 9pm(1/2). Jasmyne CRUZ, notified as precaution. No new orders at this time. Will continue to monitor.

## 2018-01-03 NOTE — PHYSICIAN QUERY
PT Name: Marcie Bazan  MR #: [id number]    Physician Query Form - Relationship to Procedure Clarification     CDS/: Cathleen Joy               Contact information: [email or web address]    This form is a permanent document in the medical record.     Query Date: January 3, 2018      By submitting this query, we are merely seeking further clarification of documentation. Please utilize your independent clinical judgment when addressing the question(s) below.    The Medical record contains the following:  Supporting Clinical Findings Location in Medical Record   Hematomas evacuated from right and left ends of chevron incision on 1/1/18.     Staples removed from right end of Chevron.  Lg hematoma evacuated.  Suture placed for oozing  Left end of incision opened, med size hematoma removed.  Transplant Liver PN 01/02       Please clarify if __Hematomas__ are    [  ] Inherent/Integral to procedure  [  ] Routine outcome  [ x ] Incidental finding  [  ] Complication of procedure  [  ] Clinically insignificant  [  ] Clinically undetermined

## 2018-01-03 NOTE — ASSESSMENT & PLAN NOTE
- staples removed from right end of Chevron.  Lg hematoma evacuated.  Suture placed for oozing- ok to remove when healing.  WD care consulted for wd vac.  - left end of incision opened, med size hematoma removed.  Wd care to assess need for wd vac.

## 2018-01-03 NOTE — PROGRESS NOTES
"Re-educated patient with self med administration. No family present. Pt was able to pull meds correctly after redirected in how to read "# of pills to take" column and to follow the correct row. It was helpful to have the pt use her finger to follow the appropriate columns and rows and she was then successful in pulling the meds correctly. Patient will need continued practice to remain successful.  "

## 2018-01-03 NOTE — PT/OT/SLP PROGRESS
Physical Therapy Treatment    Patient Name:  Marcie Bazan   MRN:  [id number]    Recommendations:     Discharge Recommendations:  home health PT   Discharge Equipment Recommendations:  (TBD)   Barriers to discharge: None    Assessment:     Marcie Bazan is a 52 y.o. female admitted with a medical diagnosis of S/P liver transplant.  She presents with the following impairments/functional limitations:  weakness, impaired endurance, gait instability, impaired balance requiring min assist for transfer, and SBA for ambulation using rollator. Pt was limited by c/o burning in buttock region stating that she could not go any further when ambulating and returned to room asking for pain medication-nurse notified.     Rehab Prognosis:  good; patient would benefit from acute skilled PT services to address these deficits and reach maximum level of function.      Recent Surgery: Procedure(s) (LRB):  TRANSPLANT-LIVER (N/A) 8 Days Post-Op    Plan:     During this hospitalization, patient to be seen 5 x/week to address the above listed problems via gait training, therapeutic activities, therapeutic exercises  · Plan of Care Expires:  01/25/18   Plan of Care Reviewed with: patient    Subjective     Communicated with pt and nurse prior to session.  Patient found sitting in chair upon PT entry to room, agreeable to treatment.      Chief Complaint: burning in buttock region  Patient comments/goals: did not state  Pain/Comfort:  · Pain Rating 1:  (did not rate)  · Location 1:  (buttocks)  · Pain Addressed 1: Pre-medicate for activity, Reposition, Distraction, Cessation of Activity, Nurse notified  · Pain Rating Post-Intervention 1:  (did not rate)    Patients cultural, spiritual, Catholic conflicts given the current situation: no    Objective:     Patient found with: peripheral IV, wound vac     General Precautions: Standard, fall   Orthopedic Precautions:N/A   Braces: N/A     Functional Mobility:  · Transfers:     · Sit to Stand:   minimum assistance with rollator performed from chair    · Gait: pt amb 116 feet using rollator within room and hallway including making turns and maneuvering around furniture with SBA with IV pole in tow with pt amb with slow lisbet and decreased step length  · Balance: pt was able to stand briefly without UE support with no LOB but increase in sway noted      AM-PAC 6 CLICK MOBILITY  Turning over in bed (including adjusting bedclothes, sheets and blankets)?: 3  Sitting down on and standing up from a chair with arms (e.g., wheelchair, bedside commode, etc.): 3  Moving from lying on back to sitting on the side of the bed?: 3  Moving to and from a bed to a chair (including a wheelchair)?: 3  Need to walk in hospital room?: 3  Climbing 3-5 steps with a railing?: 2  Total Score: 17       Patient left up in chair with call button in reach and nurse present..    GOALS:    Physical Therapy Goals        Problem: Physical Therapy Goal    Goal Priority Disciplines Outcome Goal Variances Interventions   Physical Therapy Goal     PT/OT, PT Ongoing (interventions implemented as appropriate)     Description:  Goals to be met by: 18    Patient will increase functional independence with mobility by performin. Supine to sit with Contact Guard Assistance- not met  2. Sit to stand transfer with Contact Guard Assistance - met       Revised: Sit to stand transfer with Supervision - Not met  3. Gait  x 150 feet with Contact Guard Assistance using AD if needed.- met       Revised: Gait  x 250 feet with Supervision using AD if needed.- Not met  4. Lower extremity exercise program x15 reps , with supervision -not met                 Multidisciplinary Problems (Resolved)        Problem: Physical Therapy Goal    Goal Priority Disciplines Outcome Goal Variances Interventions   Physical Therapy Goal   (Resolved)     PT/OT, PT Outcome(s) achieved     Description:  Goals to be met by: 17    Patient will increase functional  independence with mobility by performin. Supine to sit with Set-up [place] MET  2. Sit to supine with Set-up [place] MET  3. Sit to stand transfer with Supervision MET  4. Bed to chair transfer with Supervision using Rolling Walker MET  5. Gait  x 100 feet with Stand-by Assistance using Rolling Walker. MET  Revised: Gait x 300 ft with rollator usage and (S)   6. Lower extremity exercise program x20 reps per handout, with independence                          Time Tracking:     PT Received On: 18  PT Start Time: 1152     PT Stop Time: 1209  PT Total Time (min): 17 min     Billable Minutes: Gait Training 17    Treatment Type: Treatment  PT/PTA: PT     PTA Visit Number: 0     Yaquelin Cardoza, PT  2018

## 2018-01-03 NOTE — PLAN OF CARE
Problem: Patient Care Overview  Goal: Plan of Care Review  Outcome: Ongoing (interventions implemented as appropriate)  Patient acknowledges understanding with pain scale. Appropriate pain medications have been dispensed for abdominal and rectal pain. Non slip footwear in place. Bed lowered. Rails up x 2. Call bell in easy reach. Pt is afebrile. Hand washing per nursing guidelines. Patient needs one person assist. Pt encouraged to turn frequently. Patient is AAO x4 but has disorganized thoughts, stating something that occurred the previous day as though it occurred today. Patient took a 2 hour nap today which seems to have help her orientation. She pulled meds today with minimal assistance and sat up in chair most of day. Wound vac is intact, no drainage noted in canister. No bowel movement today will continue to monitor. abd still distended, bowel sounds noted. Anticipating discharge for Friday. Will continue to encourage pt to pull meds since no family was present today. No urinary incontinence today, pt assisted to bathroom. Pt worked with physical therapy briefly today but was interrupted due to patient's complaint of hemorrhoid pain. Hemorrhoids bothered pt today applied prn prepH, tuck pads, and barrier cream for comfort.

## 2018-01-03 NOTE — PLAN OF CARE
Problem: Patient Care Overview  Goal: Plan of Care Review  Outcome: Ongoing (interventions implemented as appropriate)  Pt AAO x 4. Pt 1 person assist with ambulation. Wound vac placed to chervon incision today. Zero out put thus far this shift. Pt received 2 units of blood today. Pt tolerated well. Pt complaining of pain to perianal area. Barrier cream applied when having bowel movement. Pain medication given as needed for pain to incision.  Central line removed and PIV placed today.  Pt and pt's family educated on self meds. Reinforcement needed.   Pt wearing nonslip socks/shoes when out of bed, bed in lowest position, wheels locked, side rails up x 2, call light within reach.

## 2018-01-03 NOTE — ASSESSMENT & PLAN NOTE
- (+) Urine cx on 12/22 for enterococcus.  - repeat urine cx with no growth.  - treat with amoxil 500 mg q24h starting 12/30, 7 day course. End date 1/7/18.  - dc stevens catheter 12/30.

## 2018-01-03 NOTE — PROGRESS NOTES
[facility]  Adult Nutrition  Progress Note    SUMMARY     Recommendations    Recommendation/Intervention:  Noted K/Phos WNL - Pt having trouble choosing foods on renal diet - consuming 25-50% of meals; believe PO intake would improve on regular diet - please liberalize if able.      Continue ONS TID. RD following.   Goals: Meet % EEN, EPN  Nutrition Goal Status: progressing towards goal  Communication of RD Recs: reviewed with RN    Reason for Assessment    Reason for Assessment: RD follow-up  Diagnosis: transplant/postoperative complications (OLTx 12/26)  Relevent Medical History: cirrhosis 2' EtOH, ESRD   Interdisciplinary Rounds: did not attend     General Information Comments: POD#8 s/p OLTx, pt now on renal diet, gonzalo'ing but with many complaints about food choices, mentations still partially impaired, no family at bedside, no furhter questions about transplant education    Nutrition Discharge Planning: Post transplant nutrition education provided. Food safety/drug interactions emphasized. General healthy diet recommended. RD name/contact information, education material left. No other needs identified. Caregiver present.    Nutrition Prescription Ordered    Current Diet Order: Renal  Nutrition Order Comments:25-50%         Oral Nutrition Supplement: Novasource     Evaluation of Received Nutrients/Fluid Intake              I/O: +10L since admit         Fluid Required: meeting needs  Comments: LBM 12/25  Tolerance: tolerating  % Intake of Estimated Energy Needs: 25 - 50 %  % Meal Intake: 50%     Nutrition Risk Screen     Nutrition Risk Screen: dysphagia or difficulty swallowing    Nutrition/Diet History    Patient Reported Diet/Restrictions/Preferences: low salt  Typical Food/Fluid Intake: decreased appetite, but never was a big eater  Food Preferences: No cultural or Jew preferences noted      Factors Affecting Nutritional Intake:  (-)   Labs/Tests/Procedures/Meds     Pertinent  "Labs Reviewed: reviewed  Pertinent Labs Comments: BUN 35, gfr 49, P 3.1, Mg 1.5, TBili 2.8  Pertinent Medications Reviewed: reviewed  Pertinent Medications Comments: lasix, heparin, pantoprazole, prednisone, tacrolimus    Physical Findings    Overall Physical Appearance: nourished  Tubes:  (-)  Oral/Mouth Cavity: WDL  Skin: other (see comments) (Incision/drain - abdomen)    Anthropometrics    Temp: 98.5 °F (36.9 °C)     Height: 5' 5" (165.1 cm)  Weight Method: Bed Scale  Weight: 68.5 kg (151 lb 0.2 oz)     Ideal Body Weight (IBW), Female: 125 lb     % Ideal Body Weight, Female (lb): 106.7 lb  BMI (Calculated): 22.2  BMI Grade: 18.5-24.9 - normal     Usual Body Weight (UBW), k kg  Weight Change Amount: 22 lb 0.7 oz       Estimated/Assessed Needs    Weight Used For Calorie Calculations: 70.9 kg (156 lb 4.9 oz)   Height (cm): 165.1 cm  Energy Calorie Requirements (kcal): 1636  Energy Need Method:  (x1.25 PAL)      RMR ([place]-St. Jeor Equation): 1309.88      Weight Used For Protein Calculations: 60.5 kg (133 lb 6.1 oz)  Protein Requirements: 73-91 g/d (1.2-1.5 g/kg)  Fluid Requirements (mL): per MD  Fluid Need Method: RDA Method      RDA Method (mL): 1636             Assessment and Plan    * Liver transplant 2017 for ETOH    Nutrition Diagnosis  Increased nutrient needs    Related to (etiology):   Protein for healing and maintenance    Signs and Symptoms (as evidenced by):   S/p OLTx     Interventions/Recommendations (treatment strategy):  See recs    Nutrition Diagnosis Status:   Continues              Monitor and Evaluation    Food and Nutrient Intake: energy intake, food and beverage intake, enteral nutrition intake  Food and Nutrient Adminstration: diet order, enteral and parenteral nutrition administration     Physical Activity and Function: nutrition-related ADLs and IADLs  Anthropometric Measurements: weight, weight change  Biochemical Data, Medical Tests and Procedures: lipid profile, inflammatory " profile, glucose/endocrine profile, gastrointestinal profile, electrolyte and renal panel  Nutrition-Focused Physical Findings: overall appearance    Nutrition Risk    Level of Risk:  (1x/week)    Nutrition Follow-Up    RD Follow-up?: Yes

## 2018-01-03 NOTE — PROGRESS NOTES
SW met with patient alone at bedside today for dc planning needs and continuity of care.    Pt presents alert and oriented x 3, but not fully herself today per staff.  Pt denies any coping issues or psychosocial concerns at this time.   Pt was in pain today.   Per LTS rounding team, pt should be ready for dc from hospital by the weekend.   SW discussed dc planning needs with patient today, pt asking to share information with her daughter Viola.   Pt's home wound vac will be delivered to the hospital room this afternoon per [employer] representative.   KARLA requested a [place] [address] for family to check into on Friday, pt has been assigned [address] with check in for Friday 1/05 between 10:30-11am.   KARLA spoke with pts daughter Viola ([phone]) about a caregiver being present on Friday for dc planning and checking into [facility] apts.  She verbalized she will be available to be here on Friday and about potential dc for the weekend.    KARAL Remains available for all transplant resources, education, psychosocial support and assist with all dc planning needs.

## 2018-01-03 NOTE — ASSESSMENT & PLAN NOTE
- Cr 1.4.  Diuresed well with lasix 60 mg IV yesterday.  - hold off on lasix today.    - CRRT last on 12/28. No need for dialysis.  - nephrology signed off.

## 2018-01-03 NOTE — PLAN OF CARE
Problem: Physical Therapy Goal  Goal: Physical Therapy Goal  Goals to be met by: 18    Patient will increase functional independence with mobility by performin. Supine to sit with Contact Guard Assistance- not met  2. Sit to stand transfer with Contact Guard Assistance - met       Revised: Sit to stand transfer with Supervision - Not met  3. Gait  x 150 feet with Contact Guard Assistance using AD if needed.- met       Revised: Gait  x 250 feet with Supervision using AD if needed.- Not met  4. Lower extremity exercise program x15 reps , with supervision -not met      Outcome: Ongoing (interventions implemented as appropriate)  Goals remain appropriate

## 2018-01-03 NOTE — SUBJECTIVE & OBJECTIVE
Scheduled Meds:   amoxicillin  500 mg Oral Daily    famotidine  20 mg Oral QHS    heparin (porcine)  5,000 Units Subcutaneous Q8H    mycophenolate  1,000 mg Oral BID    pantoprazole  40 mg Oral Daily    posaconazole  300 mg Oral Daily    predniSONE  20 mg Oral Daily    sulfamethoxazole-trimethoprim 400-80mg  1 tablet Oral Daily    traZODone  25 mg Oral QHS    valGANciclovir  450 mg Oral Daily     Continuous Infusions:  PRN Meds:sodium chloride, sodium chloride, bisacodyl, hydrALAZINE, HYDROmorphone, HYDROmorphone, labetalol, ondansetron, phenyleph-shark jermaine oil-mo-pet, sodium chloride 0.9%    Review of Systems   Constitutional: Positive for activity change, appetite change and fatigue. Negative for chills and fever.   HENT: Negative for congestion and facial swelling.    Eyes: Negative for pain, discharge and visual disturbance.   Respiratory: Negative for cough, chest tightness, shortness of breath and wheezing.    Cardiovascular: Negative for chest pain, palpitations and leg swelling.   Gastrointestinal: Positive for abdominal distention. Negative for abdominal pain, constipation, diarrhea, nausea and vomiting.   Endocrine: Negative.    Genitourinary: Positive for decreased urine volume. Negative for difficulty urinating and hematuria.   Musculoskeletal: Negative for back pain.   Skin: Positive for wound. Negative for pallor and rash.   Allergic/Immunologic: Positive for immunocompromised state.   Neurological: Negative for dizziness, tremors, weakness and light-headedness.   Hematological: Negative.    Psychiatric/Behavioral: Positive for confusion and dysphoric mood. Negative for agitation. The patient is not nervous/anxious.      Objective:     Vital Signs (Most Recent):  Temp: 98.5 °F (36.9 °C) (01/03/18 1108)  Pulse: 80 (01/03/18 1108)  Resp: 18 (01/03/18 1108)  BP: 139/71 (01/03/18 1108)  SpO2: 99 % (01/03/18 1108) Vital Signs (24h Range):  Temp:  [97.9 °F (36.6 °C)-98.5 °F (36.9 °C)] 98.5 °F (36.9  °C)  Pulse:  [73-84] 80  Resp:  [17-18] 18  SpO2:  [96 %-100 %] 99 %  BP: (120-157)/(62-80) 139/71     Weight: 68.5 kg (151 lb 0.2 oz)  Body mass index is 25.13 kg/m².    Intake/Output - Last 3 Shifts       01/01 0700 - 01/02 0659 01/02 0700 - 01/03 0659 01/03 0700 - 01/04 0659    P.O. 1080 1310     Blood 207 600     Total Intake(mL/kg) 1287 (18.8) 1910 (27.9)     Urine (mL/kg/hr)  1200 (0.7) 200 (0.4)    Emesis/NG output  0 (0)     Other  0 (0)     Stool  0 (0)     Total Output   1200 200    Net +1287 +710 -200           Urine Occurrence 5 x 2 x     Stool Occurrence 5 x 4 x     Emesis Occurrence  0 x           Physical Exam   Constitutional: She is oriented to person, place, and time. She appears well-developed and well-nourished.   HENT:   Head: Normocephalic and atraumatic.   Eyes: EOM are normal. Pupils are equal, round, and reactive to light. No scleral icterus.   Neck: Normal range of motion. Neck supple. No JVD present.   Cardiovascular: Normal rate, regular rhythm and normal heart sounds.    No murmur heard.  Pulmonary/Chest: Effort normal and breath sounds normal. No respiratory distress. She has no wheezes.   Abdominal: Soft. Bowel sounds are normal. She exhibits no distension.   Chevron inc right and left ends opened.  Wound vac intact. Mid chevron w tim CDI.    MAXWELL sites clean w no drainage.     Genitourinary:   Genitourinary Comments: Incont of urine/stool   Musculoskeletal: Normal range of motion. She exhibits no edema.   Neurological: She is alert and oriented to person, place, and time.   Skin: Skin is warm and dry. Capillary refill takes 2 to 3 seconds.   Psychiatric: She has a normal mood and affect. Her behavior is normal.   Mentation slow.  Alert to person place and time.     Nursing note and vitals reviewed.      Laboratory:  Immunosuppressants         Stop Route Frequency     mycophenolate capsule 1,000 mg      -- Oral 2 times daily        CBC:   Recent Labs  Lab 01/03/18  0405   WBC 9.41    RBC 3.13*   HGB 9.8*   HCT 28.3*   PLT 86*   MCV 90   MCH 31.3*   MCHC 34.6     CMP:   Recent Labs  Lab 01/03/18  0405   GLU 88   CALCIUM 8.8   ALBUMIN 3.0*   PROT 5.4*      K 3.8   CO2 28      BUN 35*   CREATININE 1.4   ALKPHOS 118   ALT 80*   AST 29   BILITOT 2.8*     Labs within the past 24 hours have been reviewed.    Diagnostic Results:  I have personally reviewed all pertinent imaging studies.

## 2018-01-04 LAB
ALBUMIN SERPL BCP-MCNC: 2.7 G/DL
ALP SERPL-CCNC: 153 U/L
ALT SERPL W/O P-5'-P-CCNC: 83 U/L
ANION GAP SERPL CALC-SCNC: 9 MMOL/L
AST SERPL-CCNC: 30 U/L
BASOPHILS # BLD AUTO: 0.01 K/UL
BASOPHILS NFR BLD: 0.1 %
BILIRUB SERPL-MCNC: 2.2 MG/DL
BUN SERPL-MCNC: 28 MG/DL
CALCIUM SERPL-MCNC: 8.4 MG/DL
CHLORIDE SERPL-SCNC: 103 MMOL/L
CO2 SERPL-SCNC: 23 MMOL/L
CREAT SERPL-MCNC: 0.9 MG/DL
DIFFERENTIAL METHOD: ABNORMAL
EOSINOPHIL # BLD AUTO: 0.1 K/UL
EOSINOPHIL NFR BLD: 1.1 %
ERYTHROCYTE [DISTWIDTH] IN BLOOD BY AUTOMATED COUNT: 17.6 %
EST. GFR  (AFRICAN AMERICAN): >60 ML/MIN/1.73 M^2
EST. GFR  (NON AFRICAN AMERICAN): >60 ML/MIN/1.73 M^2
GLUCOSE SERPL-MCNC: 96 MG/DL
HCT VFR BLD AUTO: 28 %
HGB BLD-MCNC: 9.6 G/DL
IMM GRANULOCYTES # BLD AUTO: 0.07 K/UL
IMM GRANULOCYTES NFR BLD AUTO: 0.9 %
INR PPP: 0.9
LYMPHOCYTES # BLD AUTO: 1.3 K/UL
LYMPHOCYTES NFR BLD: 15.5 %
MAGNESIUM SERPL-MCNC: 1.6 MG/DL
MCH RBC QN AUTO: 31 PG
MCHC RBC AUTO-ENTMCNC: 34.3 G/DL
MCV RBC AUTO: 90 FL
MONOCYTES # BLD AUTO: 0.6 K/UL
MONOCYTES NFR BLD: 7 %
NEUTROPHILS # BLD AUTO: 6.1 K/UL
NEUTROPHILS NFR BLD: 75.4 %
NRBC BLD-RTO: 0 /100 WBC
PHOSPHATE SERPL-MCNC: 3.2 MG/DL
PLATELET # BLD AUTO: 117 K/UL
PMV BLD AUTO: 11.6 FL
POTASSIUM SERPL-SCNC: 3.3 MMOL/L
PROT SERPL-MCNC: 5.1 G/DL
PROTHROMBIN TIME: 10.2 SEC
RBC # BLD AUTO: 3.1 M/UL
SODIUM SERPL-SCNC: 135 MMOL/L
TACROLIMUS BLD-MCNC: 7.2 NG/ML
WBC # BLD AUTO: 8.11 K/UL

## 2018-01-04 PROCEDURE — 25000003 PHARM REV CODE 250: Performed by: NURSE PRACTITIONER

## 2018-01-04 PROCEDURE — 83735 ASSAY OF MAGNESIUM: CPT

## 2018-01-04 PROCEDURE — 97168 OT RE-EVAL EST PLAN CARE: CPT

## 2018-01-04 PROCEDURE — 99233 SBSQ HOSP IP/OBS HIGH 50: CPT | Mod: 24,,, | Performed by: NURSE PRACTITIONER

## 2018-01-04 PROCEDURE — 80197 ASSAY OF TACROLIMUS: CPT

## 2018-01-04 PROCEDURE — 63600175 PHARM REV CODE 636 W HCPCS: Performed by: TRANSPLANT SURGERY

## 2018-01-04 PROCEDURE — 97535 SELF CARE MNGMENT TRAINING: CPT

## 2018-01-04 PROCEDURE — 85025 COMPLETE CBC W/AUTO DIFF WBC: CPT

## 2018-01-04 PROCEDURE — 20600001 HC STEP DOWN PRIVATE ROOM

## 2018-01-04 PROCEDURE — 85610 PROTHROMBIN TIME: CPT

## 2018-01-04 PROCEDURE — 63600175 PHARM REV CODE 636 W HCPCS: Performed by: STUDENT IN AN ORGANIZED HEALTH CARE EDUCATION/TRAINING PROGRAM

## 2018-01-04 PROCEDURE — 25000003 PHARM REV CODE 250: Performed by: STUDENT IN AN ORGANIZED HEALTH CARE EDUCATION/TRAINING PROGRAM

## 2018-01-04 PROCEDURE — 80053 COMPREHEN METABOLIC PANEL: CPT

## 2018-01-04 PROCEDURE — 84100 ASSAY OF PHOSPHORUS: CPT

## 2018-01-04 PROCEDURE — 63600175 PHARM REV CODE 636 W HCPCS: Performed by: NURSE PRACTITIONER

## 2018-01-04 RX ORDER — AMOXICILLIN 500 MG/1
500 CAPSULE ORAL EVERY 24 HOURS
Status: COMPLETED | OUTPATIENT
Start: 2018-01-04 | End: 2018-01-05

## 2018-01-04 RX ORDER — TACROLIMUS 1 MG/1
1 CAPSULE ORAL 2 TIMES DAILY
Status: DISCONTINUED | OUTPATIENT
Start: 2018-01-04 | End: 2018-01-05 | Stop reason: HOSPADM

## 2018-01-04 RX ORDER — BISACODYL 5 MG
5-10 TABLET, DELAYED RELEASE (ENTERIC COATED) ORAL DAILY PRN
Qty: 30 TABLET | Refills: 0 | Status: SHIPPED | OUTPATIENT
Start: 2018-01-04 | End: 2018-09-13

## 2018-01-04 RX ORDER — POTASSIUM CHLORIDE 20 MEQ/1
40 TABLET, EXTENDED RELEASE ORAL ONCE
Status: COMPLETED | OUTPATIENT
Start: 2018-01-04 | End: 2018-01-04

## 2018-01-04 RX ADMIN — VALGANCICLOVIR 450 MG: 450 TABLET, FILM COATED ORAL at 09:01

## 2018-01-04 RX ADMIN — AMOXICILLIN 500 MG: 500 CAPSULE ORAL at 10:01

## 2018-01-04 RX ADMIN — PANTOPRAZOLE SODIUM 40 MG: 40 TABLET, DELAYED RELEASE ORAL at 09:01

## 2018-01-04 RX ADMIN — HYDROMORPHONE HYDROCHLORIDE 4 MG: 2 TABLET ORAL at 07:01

## 2018-01-04 RX ADMIN — POSACONAZOLE 300 MG: 100 TABLET, COATED ORAL at 09:01

## 2018-01-04 RX ADMIN — MYCOPHENOLATE MOFETIL 1000 MG: 250 CAPSULE ORAL at 09:01

## 2018-01-04 RX ADMIN — TRAZODONE HYDROCHLORIDE 25 MG: 50 TABLET ORAL at 09:01

## 2018-01-04 RX ADMIN — PREDNISONE 20 MG: 10 TABLET ORAL at 09:01

## 2018-01-04 RX ADMIN — TACROLIMUS 1 MG: 1 CAPSULE ORAL at 05:01

## 2018-01-04 RX ADMIN — HEPARIN SODIUM 5000 UNITS: 5000 INJECTION, SOLUTION INTRAVENOUS; SUBCUTANEOUS at 09:01

## 2018-01-04 RX ADMIN — HEPARIN SODIUM 5000 UNITS: 5000 INJECTION, SOLUTION INTRAVENOUS; SUBCUTANEOUS at 06:01

## 2018-01-04 RX ADMIN — TACROLIMUS 1 MG: 1 CAPSULE ORAL at 11:01

## 2018-01-04 RX ADMIN — HYDROMORPHONE HYDROCHLORIDE 2 MG: 2 TABLET ORAL at 06:01

## 2018-01-04 RX ADMIN — POTASSIUM CHLORIDE 40 MEQ: 1500 TABLET, EXTENDED RELEASE ORAL at 09:01

## 2018-01-04 RX ADMIN — HEPARIN SODIUM 5000 UNITS: 5000 INJECTION, SOLUTION INTRAVENOUS; SUBCUTANEOUS at 01:01

## 2018-01-04 RX ADMIN — HYDROMORPHONE HYDROCHLORIDE 2 MG: 2 TABLET ORAL at 01:01

## 2018-01-04 RX ADMIN — SULFAMETHOXAZOLE AND TRIMETHOPRIM 1 TABLET: 400; 80 TABLET ORAL at 09:01

## 2018-01-04 RX ADMIN — HYDROMORPHONE HYDROCHLORIDE 2 MG: 2 TABLET ORAL at 09:01

## 2018-01-04 RX ADMIN — FAMOTIDINE 20 MG: 20 TABLET, FILM COATED ORAL at 09:01

## 2018-01-04 NOTE — ASSESSMENT & PLAN NOTE
- CR at baseline. Diuresed well with lasix.   - CRRT last on 12/28. No need for dialysis.  - Nephrology signed off.

## 2018-01-04 NOTE — PROGRESS NOTES
No 6pm meds ordered tonight. Pt pulled 8pm meds for practice, once again she had difficulty following the correct column and row for each med, but once redirected she was able to pull correctly. Pt continues to need practice

## 2018-01-04 NOTE — ASSESSMENT & PLAN NOTE
- Staples removed from right end of Chevron.  Lg hematoma evacuated.  Suture placed for oozing- ok to remove when healing.  WD care consulted for wd vac.  - Wound vac now placed at both ends of chevron with a bridge sponge.   - Paperwork now completed for discharge and home health orders in place.

## 2018-01-04 NOTE — PLAN OF CARE
Problem: Patient Care Overview  Goal: Plan of Care Review  Outcome: Ongoing (interventions implemented as appropriate)  Pt remained free from injury during shift. VSS. Pt AAOx4. Pt c/o abdominal pain at wound vac site. Pain controlled with 2/4mg Dilaudid. Wound vac remains CDI. Minimal output. Site to be changed tomorrow. Pt reports restlessness improving. Pt continues to have hand tremors. Pt pulled self meds 100%. Worked with OT during shift. Requiring stand by assistance. Appetite good. Call light in reach. Will continue to monitor.

## 2018-01-04 NOTE — PLAN OF CARE
Problem: Patient Care Overview  Goal: Plan of Care Review  Outcome: Ongoing (interventions implemented as appropriate)  Pt AAO X 3 but increasingly anxious overnight; pt did not sleep overnight. VSS, see flowsheet for further assessment data.     POD # 8.  Chevron incision connected to wound vac; scant amount of SS drainage overnight. Pt pulled self meds with 100% accuracy with minimal assistance from RN; Pt self med education reinforced.     Pt up with standby assistance. No new skin breakdown noted. Previously documented redness to sacrum remains unchanged; barrier cream and preparation H applied prn by PCT/RN.     I/Os monitored; 1UM UO + 400ml and 0 BMs overnight.     Fall precautions in place; pt remains free of injury at this time. Daily labs monitored. No acute events overnight.

## 2018-01-04 NOTE — PLAN OF CARE
Problem: Occupational Therapy Goal  Goal: Occupational Therapy Goal  Goals to be met by:  2 Weeks (18)    Patient will increase functional independence with ADLs by performin. Supine to sit with Modified [place].  2. Sit to Stand transfers with Supervision.   3. Toilet transfer to toilet with Supervision.  4. Grooming while standing with [place].  5. UE Dressing with [place].  6. LE Dressing with Supervision.       OT re-eval completed and goals set.  JA Barrios  2018

## 2018-01-04 NOTE — SUBJECTIVE & OBJECTIVE
Scheduled Meds:   amoxicillin  500 mg Oral Daily    famotidine  20 mg Oral QHS    heparin (porcine)  5,000 Units Subcutaneous Q8H    mycophenolate  1,000 mg Oral BID    pantoprazole  40 mg Oral Daily    posaconazole  300 mg Oral Daily    predniSONE  20 mg Oral Daily    sulfamethoxazole-trimethoprim 400-80mg  1 tablet Oral Daily    tacrolimus  1 mg Oral BID    traZODone  25 mg Oral QHS    valGANciclovir  450 mg Oral Daily     Continuous Infusions:  PRN Meds:sodium chloride, sodium chloride, bisacodyl, hydrALAZINE, HYDROmorphone, HYDROmorphone, labetalol, ondansetron, phenyleph-shark jermaine oil-mo-pet, sodium chloride 0.9%    Review of Systems   Constitutional: Positive for activity change, appetite change and fatigue. Negative for chills and fever.   HENT: Negative for congestion and facial swelling.    Eyes: Negative for pain, discharge and visual disturbance.   Respiratory: Negative for cough, chest tightness, shortness of breath and wheezing.    Cardiovascular: Negative for chest pain, palpitations and leg swelling.   Gastrointestinal: Positive for abdominal distention. Negative for abdominal pain, constipation, diarrhea, nausea and vomiting.   Endocrine: Negative.    Genitourinary: Positive for decreased urine volume. Negative for difficulty urinating and hematuria.   Musculoskeletal: Negative for back pain.   Skin: Positive for wound. Negative for pallor and rash.   Allergic/Immunologic: Positive for immunocompromised state.   Neurological: Negative for dizziness, tremors, weakness and light-headedness.   Hematological: Negative.    Psychiatric/Behavioral: Positive for confusion and dysphoric mood. Negative for agitation. The patient is not nervous/anxious.      Objective:     Vital Signs (Most Recent):  Temp: 97.3 °F (36.3 °C) (01/04/18 1119)  Pulse: 78 (01/04/18 1119)  Resp: 16 (01/04/18 1119)  BP: 129/74 (01/04/18 1119)  SpO2: 99 % (01/04/18 1119) Vital Signs (24h Range):  Temp:  [97.3 °F (36.3  °C)-98.4 °F (36.9 °C)] 97.3 °F (36.3 °C)  Pulse:  [77-81] 78  Resp:  [16-18] 16  SpO2:  [95 %-100 %] 99 %  BP: (129-153)/(68-82) 129/74     Weight: 65.4 kg (144 lb 2.9 oz)  Body mass index is 23.99 kg/m².    Intake/Output - Last 3 Shifts       01/02 0700 - 01/03 0659 01/03 0700 - 01/04 0659 01/04 0700 - 01/05 0659    P.O. 1310 1320 780    I.V. (mL/kg)  50 (0.8)     Blood 600      Total Intake(mL/kg) 1910 (27.9) 1370 (20.9) 780 (11.9)    Urine (mL/kg/hr) 1200 (0.7) 700 (0.4) 350 (0.8)    Emesis/NG output 0 (0)      Other 0 (0)      Stool 0 (0) 0 (0) 0 (0)    Total Output 1200 700 350    Net +710 +670 +430           Urine Occurrence 2 x 3 x     Stool Occurrence 4 x 1 x 0 x    Emesis Occurrence 0 x            Physical Exam   Constitutional: She is oriented to person, place, and time. She appears well-developed and well-nourished.   HENT:   Head: Normocephalic and atraumatic.   Eyes: EOM are normal. Pupils are equal, round, and reactive to light. No scleral icterus.   Neck: Normal range of motion. Neck supple. No JVD present.   Cardiovascular: Normal rate, regular rhythm and normal heart sounds.    No murmur heard.  Pulmonary/Chest: Effort normal and breath sounds normal. No respiratory distress. She has no wheezes.   Abdominal: Soft. Bowel sounds are normal. She exhibits no distension.   Chevron inc right and left ends opened.  Wound vac intact. Mid chevron w tim CDI.    MAXWELL sites clean w no drainage.     Genitourinary:   Genitourinary Comments: Incont of urine/stool   Musculoskeletal: Normal range of motion. She exhibits no edema.   Neurological: She is alert and oriented to person, place, and time.   Skin: Skin is warm and dry. Capillary refill takes 2 to 3 seconds.   Psychiatric: She has a normal mood and affect. Her behavior is normal.   Mentation slow.  Alert to person place and time.     Nursing note and vitals reviewed.      Laboratory:  Immunosuppressants         Stop Route Frequency     tacrolimus capsule 1  mg      -- Oral 2 times daily     mycophenolate capsule 1,000 mg      -- Oral 2 times daily        CBC:   Recent Labs  Lab 01/04/18  0358   WBC 8.11   RBC 3.10*   HGB 9.6*   HCT 28.0*   *   MCV 90   MCH 31.0   MCHC 34.3     CMP:   Recent Labs  Lab 01/04/18  0358   GLU 96   CALCIUM 8.4*   ALBUMIN 2.7*   PROT 5.1*   *   K 3.3*   CO2 23      BUN 28*   CREATININE 0.9   ALKPHOS 153*   ALT 83*   AST 30   BILITOT 2.2*     Coagulation:   Recent Labs  Lab 12/30/17  0430  01/04/18  0358   INR 1.2  < > 0.9   APTT 29.7  --   --    < > = values in this interval not displayed.  Labs within the past 24 hours have been reviewed.    Diagnostic Results:  I have personally reviewed all pertinent imaging studies.

## 2018-01-04 NOTE — ASSESSMENT & PLAN NOTE
- POD 1 US with elevated RIs of 1 and two perihepatic fluid collections. Repeat liver US 1/2 with satisfactory results.   - LFTs improving. Tolerating diet, ambulating with assistance, pain controlled, and has had BMs.   - Appetite fair. Prealbumin 25. Supplements ordered. Encourage PO intake.   - enc IS, enc OOB to chair.   - dc central line 1/2/18.

## 2018-01-04 NOTE — ASSESSMENT & PLAN NOTE
- H&H stable.  - 2 units PRBCs on 1/2/18.   - plt x 1 given 1/1/18 for oozing of incision.  - Monitor.

## 2018-01-04 NOTE — PT/OT/SLP RE-EVAL
"Occupational Therapy   Re-evaluation    Name: Marcie Bazan  MRN: [id number]  Admitting Diagnosis:  S/P liver transplant 9 Days Post-Op    Recommendations:     Discharge Recommendations: home health PT, home health OT  Discharge Equipment Recommendations:  shower chair, grab bar  Barriers to discharge:  Decreased caregiver support    History:     Past Medical History:   Diagnosis Date    Alcohol abuse     Alcoholic hepatitis     Anemia of chronic disease     Coagulopathy     End stage liver disease     Hypertension     Hyponatremia     Liver cirrhosis     Liver transplant candidate     Obesity (BMI 30-39.9) 1/5/2016       Past Surgical History:   Procedure Laterality Date    BREAST BIOPSY      CHOLECYSTECTOMY      COLONOSCOPY N/A 12/1/2017    Procedure: COLONOSCOPY;  Surgeon: Filemon Fuentes MD;  Location: [facility] ([address] [place] [address]);  Service: Endoscopy;  Laterality: N/A;    COLONOSCOPY N/A 12/5/2017    Procedure: COLONOSCOPY;  Surgeon: José Chavira MD;  Location: [facility] ([address] [place] [address]);  Service: Endoscopy;  Laterality: N/A;    HYSTERECTOMY      LIVER TRANSPLANT  12/2017       Subjective     Chief Complaint: pain  Patient/Family stated goals: reduce pain  Communicated with: RN prior to session.  Pt stated "I need to use the bathroom." "I need to get off my butt. I am going to lay down on my side."  Pain/Comfort:  · Pain Rating 1: 9/10  · Location - Side 1: Bilateral  · Location - Orientation 1: generalized  · Location 1: abdomen  · Pain Addressed 1: Reposition, Distraction, Nurse notified  · Pain Rating Post-Intervention 1: 9/10    Objective:     Patient found with: wound vac    General Precautions: Standard, fall   Orthopedic Precautions:    Braces:       Occupational Performance:    Bed Mobility:   Rolling: Mod (I) R/L with HOB elevated and hand rail for support   Supine>Sit: Min A with HOB elevated    Sit>Supine: SBA with HOB elevated and hand rail for support   Scooting/Bridging: Mod (I) with hand rail " "for support    Functional Mobility/ Transfers:   Sit>Stand: Min A from EOB with Hand rail for support; pt declined utilizing AD this date   Stand>Sit: CGA to sit at EOB   Toilet: Min A to stand, CGA to sit with grab bar for support    Pt performed functional mobility ~15ft with Min A and HHA. Pt declined use of AD this date 2* feeling "too shakey."      Activities of Daily Living:  UE Dressing: Minimal Assistance doff/don gown like robe seated EOB.  LE Dressing: declined  Grooming: Contact Guard Assistance to wash hands while standing at sink  Toileting: Stand-by Assistance for manjula care/hygiene and clothing management of gown and underwear from toilet with grab bar for support during standing tasks       Balance:  Static Sitting:           good  Dynamic Sitting:      fair+  Static Standing:       fair+; pt experienced 1 LOB during grooming requiring CGA and sink counter for correction  Dynamic Standing:  fair-      Cognitive/Visual Perceptual:  Cognitive/Psychosocial Skills:     Oriented to: Person, Place, Time and Situation   Follows Commands/attention:Follows multistep  commands  Communication: clear/fluent  Memory: No Deficits noted  Safety awareness/insight to disability: intact   Mood/Affect/Coping skills/emotional control: Appropriate to situation  Visual/Perceptual:      -Intact    Physical Exam:  Postural examination/scapula alignment:    -       Rounded shoulders  -       Forward head  Skin integrity: Visible skin intact; bruising noted from IV sites on arms  Edema:  None noted  Sensation:    -       Intact  Motor Planning: WFL  Dominant hand: R  Upper Extremity Range of Motion:     -       Right Upper Extremity: WFL  -       Left Upper Extremity: WFL  Upper Extremity Strength:    -       Right Upper Extremity: WFL  -       Left Upper Extremity: WFL   Strength:    -       Right Upper Extremity: WFL  -       Left Upper Extremity: WFL  Fine Motor Coordination:    -       Intact with slight tremor noted " "  Gross motor coordination:   WFL    Patient left right sidelying with all lines intact, call button in reach and RN notified    [facility] 6 Click:  AMPA Total Score: 17    Treatment & Education:  Pt educated on safety with daily tasks OOB, and importance of participating in daily ax. Pt whiteboard updated.    Education:    Assessment:     Marcie Bazan is a 52 y.o. female with a medical diagnosis of S/P liver transplant.  Pt tolerated session well and put forth good effort to participate. Pt requried increased external support during tasks OOB and transitions. Pt presented with decreased (I), endurance, stability and safety for ADLs, self-care and functional mobility. Pt will benefit from further OT in order to maximize (I) and safety for functional tasks.   Performance deficits affecting function are weakness, impaired functional mobilty, gait instability, impaired endurance, impaired balance, impaired self care skills, pain.      Rehab Prognosis:  good; patient would benefit from acute skilled OT services to address these deficits and reach maximum level of function.         Clinical Decision Makin.  OT Low:  "Pt evaluation falls under low complexity for evaluation coding due to performance deficits noted in 1-3 areas as stated above and 0 co-morbities affecting current functional status. Data obtained from problem focused assessments. No modifications or assistance was required for completion of evaluation. Only brief occupational profile and history review completed."     Plan:     Patient to be seen 4 x/week to address the above listed problems via self-care/home management, community/work re-entry, therapeutic activities, therapeutic exercises  · Plan of Care Expires: 18  · Plan of Care Reviewed with: patient    This Plan of care has been discussed with the patient who was involved in its development and understands and is in agreement with the identified goals and treatment plan    GOALS:    " Occupational Therapy Goals        Problem: Occupational Therapy Goal    Goal Priority Disciplines Outcome Interventions   Occupational Therapy Goal     OT, PT/OT     Description:  Goals to be met by:  2 Weeks (18)    Patient will increase functional independence with ADLs by performin. Supine to sit with Modified [place].  2. Sit to Stand transfers with Supervision.   3. Toilet transfer to toilet with Supervision.  4. Grooming while standing with [place].  5. UE Dressing with [place].  6. LE Dressing with Supervision.                         Time Tracking:     OT Date of Treatment: 18  OT Start Time: 946  OT Stop Time:   OT Total Time (min): 16 min    Billable Minutes:Re-eval 8  Self Care/Home Management 8    JA Barrios  2018

## 2018-01-04 NOTE — PROGRESS NOTES
[facility]  Liver Transplant  Progress Note    Patient Name: Marcie Bazan  MRN: [id number]  Admission Date: 2017  Hospital Length of Stay: 42 days  Code Status: Full Code  Primary Care Provider: ODILIA Wall  Post-Operative Day: 9    ORGAN:   LIVER  Disease Etiology: Alcoholic Cirrhosis  Donor Type:    - Brain Death  CDC High Risk:   No  Donor CMV Status:   Donor CMV Status: Negative  Donor HBcAB:   Negative  Donor HCV Status:   Negative  Whole or Partial: Whole Liver  Biliary Anastomosis: End to End  Arterial Anatomy: Standard  Subjective:     History of Present Illness:  HPI:  52 year old female with PMHx of 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 Patient presented to [facility] with generalized weakness. Onset two months ago, progressively worsening. Was admitted to  11/10 for hyponatremia seen on labs in GI office. Discharged on 11/15 and returned on  with 1 week worsening weakness, malaise, lethargy, and decreased appetite. Denied fever/chills, abdominal pain, bright red or black tarry stools, hematemesis or other abnormal bleeding, confusion or disorientation, changes in urination, light-headedness, headache, changes in vision, dyspnea, chest pain or palpitations. Labs demonstrated Na 116 and MELD 32; patient admitted for hyponatremia. Nephrology consulted, recommended fluid restriction , holding diuretics, and prednisode taper that started  (decreased from 40mg QD to 20mg Qd.) Patient transferred to [facility] for revaluation of liver disease. Of note, paracentesis performed , removed 2L, negative for SBP.     Follows with Dr. Souza for cirrhosis. Since early 2017, patient's liver failure progressing indicated by worsening clinical signs (abdominal distention, weakness/fatigue) and MELD. In  mid-October, patient's PETH demonstrated EtOH use and she was denied candidacy for liver transplant. She was then enrolled in in-patient substance abuse program at [facility] where she has remained since early November; per patient, her last EtOH beverage was 11/01/2017. Denies history of GI bleed, SBP, renal disease, or other complications of cirrhosis. Liver biopsy 10/24 demonstrated stage 4 cirrhosis. Records reports EGD in August 2016 that showed small esophageal varices with severe gastritis. Colonoscopy 2015 with 2 polyps and internal hemorrhoids Started on prednisone 40mg QD on 10/25; was on spironolactone, has been held since 11/10 for hypoNa; on lactulose.    Ms. Bazan is now s/p OLTX 12/26/17 for ETOH cirrhosis and PERKINS (steroid induction, DBD, CMV D-/R+). Post op course with confusion and disorientation. Mental status improving. Currently she is taking seroquel night. Nephrology following. CRRT started 12/28.     Hospital Course:  Mental status improving with hepatic encephalopathy treatment. Started empiric ceftriaxone given leukocytosis in immunocompromised state. CMV PCR positive, for which ID was consulted, recommended repeating titers in 1 week. Seen by Psychiatry and  who determined she was moderate risk for Liver transplant. EGD/Cscope and mammogram ordered. Colonoscopy not performed due to poor prep, EGD revealed large varices which were banded. IR paracentesis ordered after patient started complaining of abdominal pain. Mammogram performed, IR paracentesis after INR Reversal negative for SBP.  As pt with low risk malignancy, it was decided not to proceed with a breast biopsy.  Patient accepted for Liver transplant. Listed with MELD 35. Patient was slightly confused and altered, which resolved with lactulose enemas.     Liver transplant 12/27/2017 (DBD, EBV D+/R+, CMV D-/R+, donor normal anatomy)     POD# 0 - Slow to arouse from sedation, not following commands, minimal vent setting  however unable to extubate due to mental status. Poor UOP.     POD#1 - Extubated, pt more alert and able to follow commands however confused and disoriented. Poor UOP 10-15 cc/hr. LFTs trending down , normalized INR, Liver US within normal limits. Thrombocytopenia, 1 pack plts given with line removal, transducer in RIJ  Developed hematoma, required holding pressure for 1.5hrs and another pack of plts.     POD#2 - Improve mental status AAO to person and place, not situation or time. Continues to have minimal UOP not responsive to lasix, Nephrology consulted, started CRRT. LFTs continue to trend down, INR normalized, plts 136     POD# 3 - UOP remaines 5cc/hr, tolerated CRRT for 10 hours, 2.5L UF. Mental status continues to clear up. AF, HDS, tolerating diet, OOB, pain controlled.    Interval history: no acute events overnight. Pt reports feeling well this am but dose have complaints of increased jitteriness. Recent elevated prograf trough could be the cause of jitteriness. Able to sleep a little more overnight with trazodone. Pt alert and oriented but continues to have slowed mentation. Hematomas evacuated from [facility] on 1/1/18 and wound vac now placed to chevron inc. LFTs stable. Continue treatment with Amoxil for Enterococcus UTI. Plan for 7 day course, end date 1/7/18. Monitor.      Scheduled Meds:   amoxicillin  500 mg Oral Daily    famotidine  20 mg Oral QHS    heparin (porcine)  5,000 Units Subcutaneous Q8H    mycophenolate  1,000 mg Oral BID    pantoprazole  40 mg Oral Daily    posaconazole  300 mg Oral Daily    predniSONE  20 mg Oral Daily    sulfamethoxazole-trimethoprim 400-80mg  1 tablet Oral Daily    tacrolimus  1 mg Oral BID    traZODone  25 mg Oral QHS    valGANciclovir  450 mg Oral Daily     Continuous Infusions:  PRN Meds:sodium chloride, sodium chloride, bisacodyl, hydrALAZINE, HYDROmorphone, HYDROmorphone, labetalol, ondansetron, phenyleph-shark jermaine oil-mo-pet, sodium chloride  0.9%    Review of Systems   Constitutional: Positive for activity change, appetite change and fatigue. Negative for chills and fever.   HENT: Negative for congestion and facial swelling.    Eyes: Negative for pain, discharge and visual disturbance.   Respiratory: Negative for cough, chest tightness, shortness of breath and wheezing.    Cardiovascular: Negative for chest pain, palpitations and leg swelling.   Gastrointestinal: Positive for abdominal distention. Negative for abdominal pain, constipation, diarrhea, nausea and vomiting.   Endocrine: Negative.    Genitourinary: Positive for decreased urine volume. Negative for difficulty urinating and hematuria.   Musculoskeletal: Negative for back pain.   Skin: Positive for wound. Negative for pallor and rash.   Allergic/Immunologic: Positive for immunocompromised state.   Neurological: Negative for dizziness, tremors, weakness and light-headedness.   Hematological: Negative.    Psychiatric/Behavioral: Positive for confusion and dysphoric mood. Negative for agitation. The patient is not nervous/anxious.      Objective:     Vital Signs (Most Recent):  Temp: 97.3 °F (36.3 °C) (01/04/18 1119)  Pulse: 78 (01/04/18 1119)  Resp: 16 (01/04/18 1119)  BP: 129/74 (01/04/18 1119)  SpO2: 99 % (01/04/18 1119) Vital Signs (24h Range):  Temp:  [97.3 °F (36.3 °C)-98.4 °F (36.9 °C)] 97.3 °F (36.3 °C)  Pulse:  [77-81] 78  Resp:  [16-18] 16  SpO2:  [95 %-100 %] 99 %  BP: (129-153)/(68-82) 129/74     Weight: 65.4 kg (144 lb 2.9 oz)  Body mass index is 23.99 kg/m².    Intake/Output - Last 3 Shifts       01/02 0700 - 01/03 0659 01/03 0700 - 01/04 0659 01/04 0700 - 01/05 0659    P.O. 1310 1320 780    I.V. (mL/kg)  50 (0.8)     Blood 600      Total Intake(mL/kg) 1910 (27.9) 1370 (20.9) 780 (11.9)    Urine (mL/kg/hr) 1200 (0.7) 700 (0.4) 350 (0.8)    Emesis/NG output 0 (0)      Other 0 (0)      Stool 0 (0) 0 (0) 0 (0)    Total Output 1200 700 350    Net +710 +670 +430           Urine Occurrence  2 x 3 x     Stool Occurrence 4 x 1 x 0 x    Emesis Occurrence 0 x            Physical Exam   Constitutional: She is oriented to person, place, and time. She appears well-developed and well-nourished.   HENT:   Head: Normocephalic and atraumatic.   Eyes: EOM are normal. Pupils are equal, round, and reactive to light. No scleral icterus.   Neck: Normal range of motion. Neck supple. No JVD present.   Cardiovascular: Normal rate, regular rhythm and normal heart sounds.    No murmur heard.  Pulmonary/Chest: Effort normal and breath sounds normal. No respiratory distress. She has no wheezes.   Abdominal: Soft. Bowel sounds are normal. She exhibits no distension.   Chevron inc right and left ends opened.  Wound vac intact. Mid chevron w tim CDI.    MAXWELL sites clean w no drainage.     Genitourinary:   Genitourinary Comments: Incont of urine/stool   Musculoskeletal: Normal range of motion. She exhibits no edema.   Neurological: She is alert and oriented to person, place, and time.   Skin: Skin is warm and dry. Capillary refill takes 2 to 3 seconds.   Psychiatric: She has a normal mood and affect. Her behavior is normal.   Mentation slow.  Alert to person place and time.     Nursing note and vitals reviewed.      Laboratory:  Immunosuppressants         Stop Route Frequency     tacrolimus capsule 1 mg      -- Oral 2 times daily     mycophenolate capsule 1,000 mg      -- Oral 2 times daily        CBC:   Recent Labs  Lab 01/04/18  0358   WBC 8.11   RBC 3.10*   HGB 9.6*   HCT 28.0*   *   MCV 90   MCH 31.0   MCHC 34.3     CMP:   Recent Labs  Lab 01/04/18  0358   GLU 96   CALCIUM 8.4*   ALBUMIN 2.7*   PROT 5.1*   *   K 3.3*   CO2 23      BUN 28*   CREATININE 0.9   ALKPHOS 153*   ALT 83*   AST 30   BILITOT 2.2*     Coagulation:   Recent Labs  Lab 12/30/17  0430  01/04/18  0358   INR 1.2  < > 0.9   APTT 29.7  --   --    < > = values in this interval not displayed.  Labs within the past 24 hours have been  reviewed.    Diagnostic Results:  I have personally reviewed all pertinent imaging studies.    Assessment/Plan:     * Liver transplant 12/26/2017 for ETOH    - POD 1 US with elevated RIs of 1 and two perihepatic fluid collections. Repeat liver US 1/2 with satisfactory results.   - LFTs improving. Tolerating diet, ambulating with assistance, pain controlled, and has had BMs.   - Appetite fair. Prealbumin 25. Supplements ordered. Encourage PO intake.   - enc IS, enc OOB to chair.   - dc central line 1/2/18.           Long-term use of immunosuppressant medication    - continue prograf, cellcept and prednisone. Monitor labs daily and adjust dose accordingly for a therapeutic level.           Prophylactic immunotherapy    - see long term IS.           Anemia of chronic disease    - H&H stable.  - 2 units PRBCs on 1/2/18.   - plt x 1 given 1/1/18 for oozing of incision.  - Monitor.           Dehiscence of surgical wound following transplant    - Staples removed from right end of Chevron.  Lg hematoma evacuated.  Suture placed for oozing- ok to remove when healing.  WD care consulted for wd vac.  - Wound vac now placed at both ends of chevron with a bridge sponge.   - Paperwork now completed for discharge and home health orders in place.         Physical deconditioning    - PT/OT following.            Enterococcus UTI    - (+) Urine cx on 12/22 for enterococcus.  - repeat urine cx with no growth.  - treat with amoxil 500 mg q24h starting 12/30, 7 day course. End date 1/7/18.  - dc stevens catheter 12/30.          At risk for opportunistic infections    - continue valcyte and bactrim for cmv/pcp prophylaxis.           DEREK (acute kidney injury)    - CR at baseline. Diuresed well with lasix.   - CRRT last on 12/28. No need for dialysis.  - Nephrology signed off.             Adrenal cortical steroids causing adverse effect in therapeutic use    - insulin off.  apprec Endo recs.          Essential hypertension    - Dc'd  nifedipine 12/30. Monitor.               VTE Risk Mitigation         Ordered     heparin (porcine) injection 5,000 Units  Every 8 hours     Route:  Subcutaneous        12/26/17 1425     Place sequential compression device  Until discontinued      12/26/17 1425     High Risk of VTE  Once      12/26/17 1425     Place FABIOLA hose  Until discontinued      11/23/17 2305          The patients clinical status was discussed at multidisplinary rounds, involving transplant surgery, transplant medicine, pharmacy, nursing, nutrition, and social work    Discharge Planning:  Not a candidate for d/c at this time.     Devon Robles, NP  Liver Transplant  [facility]

## 2018-01-04 NOTE — PROGRESS NOTES
SW Update:    Per medical chart, team is recommending pt to discharge with HH: SN/PT/wound care. Pt has Medicaid insurance ([facility] [place] [employer] [facility]) and is not eligible for HH: PT. KARLA has requested NP Devon to place orders for Outpatient PT. SW presented to pt's room who presented as alert and oriented x 4 laying in bed watching tv. Pt did appear to be in some pain but states she just received her pain medication and that she is always in pain but it is improving. KARLA informed pt of team recommendations and explained she is not eligible for HH:PT and will need outpatient PT. Pt confirms she has transportation from her caregivers to get her to and from outpatient PT. SW also explained that their are limited HH agency's who accept Medicaid for HH: wound care & SN but reported [facility] does. Pt reports she is okay with using [facility] for HH and [facility] for Outpatient PT. SW has faxed new referral and documents to [facility]  for SN & wound care (ph#[phone]/ fax#[phone]). Order for Outpatient PT have been places. Pt reports her daughter Viola or son or sister will be the one to stay locally with her first but she is unsure who it will be exactly and has deferred this question to her daughter.     KARLA contacted daughter Viola by phone who presents as alert and oriented x 4. SW informed daughter about pt's discharge plan regarding HH: wound care/SN and Outpatient PT. Daughter verbalized her understanding and states all caregivers with her will be able to bring pt to all appointments. KARLA will provide pt and caregivers with the contact information for [facility] and [facility]. Pt's daughter reports she will be present tomorrow to check into [address] at 10:30 am. Daughter reports either she or pt's friend Ross will be staying overnight with her for the weekend. No other issues or concerns noted at this time. KARLA remains available for education, resources, and support as appropriate.

## 2018-01-05 VITALS
OXYGEN SATURATION: 100 % | HEIGHT: 65 IN | WEIGHT: 158.75 LBS | TEMPERATURE: 98 F | SYSTOLIC BLOOD PRESSURE: 136 MMHG | HEART RATE: 82 BPM | RESPIRATION RATE: 17 BRPM | DIASTOLIC BLOOD PRESSURE: 82 MMHG | BODY MASS INDEX: 26.45 KG/M2

## 2018-01-05 DIAGNOSIS — Z94.4 LIVER REPLACED BY TRANSPLANT: Primary | ICD-10-CM

## 2018-01-05 LAB
ALBUMIN SERPL BCP-MCNC: 2.6 G/DL
ALP SERPL-CCNC: 191 U/L
ALT SERPL W/O P-5'-P-CCNC: 99 U/L
ANION GAP SERPL CALC-SCNC: 9 MMOL/L
AST SERPL-CCNC: 31 U/L
BASOPHILS # BLD AUTO: 0.01 K/UL
BASOPHILS NFR BLD: 0.1 %
BILIRUB SERPL-MCNC: 2.1 MG/DL
BLD PROD TYP BPU: NORMAL
BLD PROD TYP BPU: NORMAL
BLOOD UNIT EXPIRATION DATE: NORMAL
BLOOD UNIT EXPIRATION DATE: NORMAL
BLOOD UNIT TYPE CODE: 600
BLOOD UNIT TYPE CODE: 600
BLOOD UNIT TYPE: NORMAL
BLOOD UNIT TYPE: NORMAL
BUN SERPL-MCNC: 22 MG/DL
CALCIUM SERPL-MCNC: 8.4 MG/DL
CHLORIDE SERPL-SCNC: 105 MMOL/L
CO2 SERPL-SCNC: 24 MMOL/L
CODING SYSTEM: NORMAL
CODING SYSTEM: NORMAL
CREAT SERPL-MCNC: 1 MG/DL
DIFFERENTIAL METHOD: ABNORMAL
DISPENSE STATUS: NORMAL
DISPENSE STATUS: NORMAL
EOSINOPHIL # BLD AUTO: 0.1 K/UL
EOSINOPHIL NFR BLD: 1.3 %
ERYTHROCYTE [DISTWIDTH] IN BLOOD BY AUTOMATED COUNT: 17.9 %
EST. GFR  (AFRICAN AMERICAN): >60 ML/MIN/1.73 M^2
EST. GFR  (NON AFRICAN AMERICAN): >60 ML/MIN/1.73 M^2
GLUCOSE SERPL-MCNC: 92 MG/DL
HCT VFR BLD AUTO: 28.6 %
HGB BLD-MCNC: 9.4 G/DL
IMM GRANULOCYTES # BLD AUTO: 0.07 K/UL
IMM GRANULOCYTES NFR BLD AUTO: 1 %
INR PPP: 0.9
LYMPHOCYTES # BLD AUTO: 1.3 K/UL
LYMPHOCYTES NFR BLD: 18.3 %
MAGNESIUM SERPL-MCNC: 1.6 MG/DL
MCH RBC QN AUTO: 30.3 PG
MCHC RBC AUTO-ENTMCNC: 32.9 G/DL
MCV RBC AUTO: 92 FL
MONOCYTES # BLD AUTO: 0.6 K/UL
MONOCYTES NFR BLD: 7.7 %
NEUTROPHILS # BLD AUTO: 5.1 K/UL
NEUTROPHILS NFR BLD: 71.6 %
NRBC BLD-RTO: 0 /100 WBC
NUM UNITS TRANS PACKED RBC: NORMAL
NUM UNITS TRANS PACKED RBC: NORMAL
PHOSPHATE SERPL-MCNC: 3 MG/DL
PLATELET # BLD AUTO: 147 K/UL
PMV BLD AUTO: 11.4 FL
POTASSIUM SERPL-SCNC: 3.4 MMOL/L
PROT SERPL-MCNC: 5.2 G/DL
PROTHROMBIN TIME: 9.7 SEC
RBC # BLD AUTO: 3.1 M/UL
SODIUM SERPL-SCNC: 138 MMOL/L
TACROLIMUS BLD-MCNC: 9.4 NG/ML
WBC # BLD AUTO: 7.17 K/UL

## 2018-01-05 PROCEDURE — 63600175 PHARM REV CODE 636 W HCPCS: Performed by: NURSE PRACTITIONER

## 2018-01-05 PROCEDURE — 99239 HOSP IP/OBS DSCHRG MGMT >30: CPT | Mod: 24,,, | Performed by: NURSE PRACTITIONER

## 2018-01-05 PROCEDURE — 25000003 PHARM REV CODE 250: Performed by: NURSE PRACTITIONER

## 2018-01-05 PROCEDURE — 25000003 PHARM REV CODE 250: Performed by: STUDENT IN AN ORGANIZED HEALTH CARE EDUCATION/TRAINING PROGRAM

## 2018-01-05 PROCEDURE — 63600175 PHARM REV CODE 636 W HCPCS: Performed by: TRANSPLANT SURGERY

## 2018-01-05 PROCEDURE — 97530 THERAPEUTIC ACTIVITIES: CPT

## 2018-01-05 PROCEDURE — 97608 NEG PRS WND THER NDME>50SQCM: CPT

## 2018-01-05 PROCEDURE — 80197 ASSAY OF TACROLIMUS: CPT

## 2018-01-05 PROCEDURE — 63600175 PHARM REV CODE 636 W HCPCS: Performed by: STUDENT IN AN ORGANIZED HEALTH CARE EDUCATION/TRAINING PROGRAM

## 2018-01-05 PROCEDURE — 83735 ASSAY OF MAGNESIUM: CPT

## 2018-01-05 PROCEDURE — 80053 COMPREHEN METABOLIC PANEL: CPT

## 2018-01-05 PROCEDURE — 84100 ASSAY OF PHOSPHORUS: CPT

## 2018-01-05 PROCEDURE — 85610 PROTHROMBIN TIME: CPT

## 2018-01-05 PROCEDURE — 85025 COMPLETE CBC W/AUTO DIFF WBC: CPT

## 2018-01-05 PROCEDURE — 97116 GAIT TRAINING THERAPY: CPT

## 2018-01-05 RX ORDER — MAGNESIUM SULFATE HEPTAHYDRATE 40 MG/ML
2 INJECTION, SOLUTION INTRAVENOUS ONCE
Status: COMPLETED | OUTPATIENT
Start: 2018-01-05 | End: 2018-01-05

## 2018-01-05 RX ORDER — ASPIRIN 81 MG/1
81 TABLET ORAL DAILY
Status: DISCONTINUED | OUTPATIENT
Start: 2018-01-05 | End: 2018-01-05 | Stop reason: HOSPADM

## 2018-01-05 RX ORDER — HYDROMORPHONE HYDROCHLORIDE 4 MG/1
2-4 TABLET ORAL
Qty: 40 TABLET | Refills: 0 | Status: SHIPPED | OUTPATIENT
Start: 2018-01-05 | End: 2018-01-16 | Stop reason: SDUPTHER

## 2018-01-05 RX ORDER — TRAZODONE HYDROCHLORIDE 50 MG/1
25 TABLET ORAL NIGHTLY
Qty: 15 TABLET | Refills: 11 | Status: SHIPPED | OUTPATIENT
Start: 2018-01-05 | End: 2020-01-24 | Stop reason: SDUPTHER

## 2018-01-05 RX ORDER — ONDANSETRON 4 MG/1
4 TABLET, ORALLY DISINTEGRATING ORAL EVERY 8 HOURS PRN
Qty: 12 TABLET | Refills: 0 | Status: SHIPPED | OUTPATIENT
Start: 2018-01-05 | End: 2018-01-29 | Stop reason: SDUPTHER

## 2018-01-05 RX ORDER — ASPIRIN 81 MG/1
81 TABLET ORAL DAILY
Qty: 30 TABLET | Refills: 11 | Status: SHIPPED | OUTPATIENT
Start: 2018-01-05 | End: 2018-03-20 | Stop reason: SDUPTHER

## 2018-01-05 RX ORDER — TACROLIMUS 1 MG/1
1 CAPSULE ORAL EVERY 12 HOURS
Qty: 60 CAPSULE | Refills: 11 | Status: SHIPPED | OUTPATIENT
Start: 2018-01-05 | End: 2018-01-08 | Stop reason: SDUPTHER

## 2018-01-05 RX ORDER — POTASSIUM CHLORIDE 20 MEQ/1
40 TABLET, EXTENDED RELEASE ORAL 2 TIMES DAILY
Status: COMPLETED | OUTPATIENT
Start: 2018-01-05 | End: 2018-01-05

## 2018-01-05 RX ADMIN — MAGNESIUM SULFATE IN WATER 2 G: 40 INJECTION, SOLUTION INTRAVENOUS at 09:01

## 2018-01-05 RX ADMIN — TACROLIMUS 1 MG: 1 CAPSULE ORAL at 08:01

## 2018-01-05 RX ADMIN — POSACONAZOLE 300 MG: 100 TABLET, COATED ORAL at 08:01

## 2018-01-05 RX ADMIN — SULFAMETHOXAZOLE AND TRIMETHOPRIM 1 TABLET: 400; 80 TABLET ORAL at 08:01

## 2018-01-05 RX ADMIN — HEPARIN SODIUM 5000 UNITS: 5000 INJECTION, SOLUTION INTRAVENOUS; SUBCUTANEOUS at 02:01

## 2018-01-05 RX ADMIN — HYDROMORPHONE HYDROCHLORIDE 2 MG: 2 TABLET ORAL at 09:01

## 2018-01-05 RX ADMIN — VALGANCICLOVIR 450 MG: 450 TABLET, FILM COATED ORAL at 08:01

## 2018-01-05 RX ADMIN — ASPIRIN 81 MG: 81 TABLET, DELAYED RELEASE ORAL at 01:01

## 2018-01-05 RX ADMIN — HYDROMORPHONE HYDROCHLORIDE 4 MG: 2 TABLET ORAL at 02:01

## 2018-01-05 RX ADMIN — MYCOPHENOLATE MOFETIL 1000 MG: 250 CAPSULE ORAL at 08:01

## 2018-01-05 RX ADMIN — PREDNISONE 20 MG: 10 TABLET ORAL at 08:01

## 2018-01-05 RX ADMIN — POTASSIUM CHLORIDE 40 MEQ: 1500 TABLET, EXTENDED RELEASE ORAL at 01:01

## 2018-01-05 RX ADMIN — POTASSIUM CHLORIDE 40 MEQ: 1500 TABLET, EXTENDED RELEASE ORAL at 09:01

## 2018-01-05 RX ADMIN — HEPARIN SODIUM 5000 UNITS: 5000 INJECTION, SOLUTION INTRAVENOUS; SUBCUTANEOUS at 06:01

## 2018-01-05 RX ADMIN — AMOXICILLIN 500 MG: 500 CAPSULE ORAL at 08:01

## 2018-01-05 NOTE — PROGRESS NOTES
Met with patient and her daughter in preparation for discharge today.  Checked the answers to the review in the back of the book.  Patient missed one question.  Rationale for correct answer given.  This review consists of questions regarding: Post op care, mediation management, infection prevention and emergency contacts.  Reviewed outpatient appointments.  Allowed time for questions and answers.

## 2018-01-05 NOTE — PLAN OF CARE
Problem: Physical Therapy Goal  Goal: Physical Therapy Goal  Goals to be met by: 18    Patient will increase functional independence with mobility by performin. Supine to sit with Contact Guard Assistance- not met  2. Sit to stand transfer with Contact Guard Assistance - met       Revised: Sit to stand transfer with Supervision - Not met  3. Gait  x 150 feet with Contact Guard Assistance using AD if needed.- met       Revised: Gait  x 250 feet with Supervision using AD if needed.- Not met  4. Lower extremity exercise program x15 reps , with supervision -not met      Outcome: Ongoing (interventions implemented as appropriate)  Pt progressing towards goals.     IZAIAH VELAZQUEZ, PT  2018

## 2018-01-05 NOTE — PT/OT/SLP PROGRESS
Physical Therapy Treatment    Patient Name:  Marcie Bazan   MRN:  [id number]    Recommendations:     Discharge Recommendations:  home with home health   Discharge Equipment Recommendations: shower chair, grab bar   Barriers to discharge: Decreased caregiver support and pt requires increased assist at this time    Assessment:     Marcie Bazan is a 52 y.o. female admitted with a medical diagnosis of S/P liver transplant.  She presents with the following impairments/functional limitations:  weakness, gait instability, impaired endurance, impaired balance, decreased lower extremity function, impaired functional mobilty. Pt performed bed mobility S and transfers CGA/min A. Pt amb ~200ft S with rollator; no LOB and mild SOB. Pt will continue to benefit from skilled PT to improve deficits and increase overall functional mobility.     Rehab Prognosis:  Good; patient would benefit from acute skilled PT services to address these deficits and reach maximum level of function.      Recent Surgery: Procedure(s) ([facility]):  TRANSPLANT-LIVER (N/A) 10 Days Post-Op    Plan:     During this hospitalization, patient to be seen 3 x/week to address the above listed problems via gait training, therapeutic activities, therapeutic exercises  · Plan of Care Expires:  01/25/18   Plan of Care Reviewed with: patient    Subjective     Communicated with RN prior to session.  Patient found supine in bed upon PT entry to room, agreeable to treatment.      Chief Complaint: weakness  Patient comments/goals: return home  Pain/Comfort:  · Pain Rating 1: 0/10  · Pain Rating Post-Intervention 1: 0/10    Patients cultural, spiritual, Restorationist conflicts given the current situation: no    Objective:     Patient found with: wound vac     General Precautions: Standard, fall   Orthopedic Precautions:N/A   Braces: N/A     Functional Mobility:  Bed Mobility:     · Supine to Sit: supervision    Transfers:     · Sit to Stand:     -from EOB CGA with  rollator   -from bedside chair min A with rollator; x4 trials    Gait: ~200ft S with rollator; no LOB and mild SOB      AM-PAC 6 CLICK MOBILITY  Turning over in bed (including adjusting bedclothes, sheets and blankets)?: 4  Sitting down on and standing up from a chair with arms (e.g., wheelchair, bedside commode, etc.): 3  Moving from lying on back to sitting on the side of the bed?: 3  Moving to and from a bed to a chair (including a wheelchair)?: 3  Need to walk in hospital room?: 3  Climbing 3-5 steps with a railing?: 3  Total Score: 19       Therapeutic Activities and Exercises:   Pt educated on:  -safety with transfers and amb with RW  -importance of OOB activity  -sitting up in chair for meals  -amb in hallway 3x's daily  Pt safe to amb in hallway with RN staff with rollator    Patient left up in chair with all lines intact, call button in reach and RN notified..    GOALS:    Physical Therapy Goals        Problem: Physical Therapy Goal    Goal Priority Disciplines Outcome Goal Variances Interventions   Physical Therapy Goal     PT/OT, PT Ongoing (interventions implemented as appropriate)     Description:  Goals to be met by: 18    Patient will increase functional independence with mobility by performin. Supine to sit with Contact Guard Assistance- not met  2. Sit to stand transfer with Contact Guard Assistance - met       Revised: Sit to stand transfer with Supervision - Not met  3. Gait  x 150 feet with Contact Guard Assistance using AD if needed.- met       Revised: Gait  x 250 feet with Supervision using AD if needed.- Not met  4. Lower extremity exercise program x15 reps , with supervision -not met                 Multidisciplinary Problems (Resolved)        Problem: Physical Therapy Goal    Goal Priority Disciplines Outcome Goal Variances Interventions   Physical Therapy Goal   (Resolved)     PT/OT, PT Outcome(s) achieved     Description:  Goals to be met by: 17    Patient will increase  functional independence with mobility by performin. Supine to sit with Set-up [place] MET  2. Sit to supine with Set-up [place] MET  3. Sit to stand transfer with Supervision MET  4. Bed to chair transfer with Supervision using Rolling Walker MET  5. Gait  x 100 feet with Stand-by Assistance using Rolling Walker. MET  Revised: Gait x 300 ft with rollator usage and (S)   6. Lower extremity exercise program x20 reps per handout, with independence                          Time Tracking:     PT Received On: 18  PT Start Time: 827     PT Stop Time: 850  PT Total Time (min): 23 min     Billable Minutes: Gait Training 10 and Therapeutic Activity 13    Treatment Type: Treatment  PT/PTA: PT     PTA Visit Number: 0     IZAIAH VELAZQUEZ, PT  2018

## 2018-01-05 NOTE — PROGRESS NOTES
Discharge Note:    SW presented to pt's room for follow up and discharge planning. Pt presented as alert and oriented x 4 sitting in recliner. Pt was accompanied by friend Ross, daughter Viola ([phone]), and two granddaughters. Per medical rounding team, pt is scheduled to discharge today. Pt will discharge to local lodging at the  [address] under the care of family rotating. Caregiver include daughter Viola, friend Ross, son Duarte ([phone]), and sister Patricia ([phone]). Pt will discharge with a wound vac, HH: wound care, SN, and Outpatient PT. SW has informed Outpatient PT that pt will be discharging, Radha reports she will contact pt or daughter to schedule. SW has faxed discharge summary to [facility] (ph#[phone]/fax#[phone]) and informed them of pt's discharge today. Rep reports fax was received. Team has hooked pt up to her home wound vac through [facility]. KARLA provided pt with the address and contact information for [facility] and contact information to [facility] . Pt's daughter Viola confirms she has checked into the  apartments this morning. Daughter reports she has mostly everything for apartment but needs to make a trip to [facility] for a few things. Pt will need to complete ETOH treatment once she is medically stable, SW will continue to follow pt in outpatient transplant clinic to ensure pt enrolls. Pt reports coping well with aid of family and friend support and denies any current mental health difficulties. No issues or psychosocial barriers to discharge are noted. KARLA remains available for education, resources, and support as appropriate.

## 2018-01-05 NOTE — PLAN OF CARE
Problem: Patient Care Overview  Goal: Plan of Care Review  Outcome: Ongoing (interventions implemented as appropriate)  Pt is AAOx3.Standard precautions maintained.  Pt turns independently, pt is aware of bony area and pressure reduction positions Pt remains injury and fall free, non skid footwear donned, call light within reach, personal items within reach, bed in low/locked position, pt able to voice needs all needs voiced have been met at this time.

## 2018-01-05 NOTE — PROGRESS NOTES
DISCHARGE NOTE:    Macrie Bazan is a 52 y.o. 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      Past Medical History:   Diagnosis Date    Alcohol abuse     Alcoholic hepatitis     Anemia of chronic disease     Coagulopathy     End stage liver disease     Hypertension     Hyponatremia     Liver cirrhosis     Liver transplant candidate     Obesity (BMI 30-39.9) 2016       Hospital Course: Hospital course complicated by disorientation and confusion.  Seroquel started, mentation improved and switched to trazadone.  Of note, urine culture prior to transplant positive for enterococcus UTI treated for 7 days with amoxicillin.      On mold prophylaxis with posaconazole given extensive pre-transplant admission.      Allergies:   Review of patient's allergies indicates:   Allergen Reactions    Ferrous sulfate Other (See Comments)     Patient states the pill makes her sick. She stated she would rather have a shot       Patient Pharmacy: [facility]    Discharge Medications:   Marcie Bazan   Home Medication Instructions JUAN MANUEL:[id number]    Printed on:18 0954   Medication Information                      aspirin (ECOTRIN) 81 MG EC tablet  Take 1 tablet (81 mg total) by mouth once daily.             bisacodyl (DULCOLAX) 5 mg EC tablet  Take 1-2 tablets (5-10 mg total) by mouth daily as needed for Constipation.             famotidine (PEPCID) 20 MG tablet  Take 1 tablet (20 mg total) by mouth every evening.             HYDROmorphone (DILAUDID) 4 MG tablet  Take 0.5-1 tablets (2-4 mg total) by mouth every 3 (three) hours as needed.             mycophenolate (CELLCEPT) 250 mg Cap  Take 4 capsules (1,000 mg total) by mouth 2 (two) times daily.             ondansetron (ZOFRAN-ODT) 4 MG TbDL  Take 1 tablet (4 mg total) by mouth every 8 (eight) hours as needed (nausea and vomiting).             posaconazole 100 mg TbEC tablet  Take 3 tablets  (300 mg total) by mouth once daily. Stop on 1/25/18             predniSONE (DELTASONE) 10 MG tablet  Take 20mg PO QD 1/1-1/7; 15mg PO QD 1/8-1/14; 10mg PO QD 1/15-1/21; 5mg PO QD 1/22-1/28; 2.5mg PO QD 1/29-2/4; Stop on 2/5/18             sulfamethoxazole-trimethoprim 400-80mg (BACTRIM,SEPTRA) 400-80 mg per tablet  Take 1 tablet by mouth once daily. Stop on 6/24/18             tacrolimus (PROGRAF) 1 MG Cap  Take 1 capsule (1 mg total) by mouth every 12 (twelve) hours.             traZODone (DESYREL) 50 MG tablet  Take 0.5 tablets (25 mg total) by mouth every evening.             valGANciclovir (VALCYTE) 450 mg Tab  Take 1 tablet (450 mg total) by mouth once daily. Stop on 3/26/18                 Pharmacy Interventions/Recommendations:  1) Transplant Immunosuppression: Tac 1/1, MMF 1000 mg BID, pred taper    2) Opportunistic Infection prophylaxis: Valcyte x3 mo, bactrim x6 mo, posaconazole x1 mo    3) Patient Counseling/Education:  Demonstrated the use of the BP cuff, thermometer.    4) Follow-Up/Discharge Needs:  Instructed to  Zofran and Trazodone from outpatient pharmacy on her way out    5) Patient received prescriptions for:      E-rx's:  As above         Printed rx's:  none      Faxed / Phoned in rx's:  none  To [facility] pharmacy per patient request.    6) Patient Assistance Information: none    7) The following medications have been placed on HOLD and should be restarted in the outpatient setting (when appropriate): none    Marcie and her caregiver verbalized their understanding and had the opportunity to ask questions.

## 2018-01-06 ENCOUNTER — NURSE TRIAGE (OUTPATIENT)
Dept: ADMINISTRATIVE | Facility: CLINIC | Age: 53
End: 2018-01-06

## 2018-01-06 ENCOUNTER — LAB VISIT (OUTPATIENT)
Dept: LAB | Facility: HOSPITAL | Age: 53
End: 2018-01-06
Attending: SURGERY
Payer: COMMERCIAL

## 2018-01-06 ENCOUNTER — TELEPHONE (OUTPATIENT)
Dept: TRANSPLANT | Facility: CLINIC | Age: 53
End: 2018-01-06

## 2018-01-06 DIAGNOSIS — Z94.4 LIVER REPLACED BY TRANSPLANT: ICD-10-CM

## 2018-01-06 LAB
ALBUMIN SERPL BCP-MCNC: 2.9 G/DL
ALP SERPL-CCNC: 207 U/L
ALT SERPL W/O P-5'-P-CCNC: 120 U/L
ANION GAP SERPL CALC-SCNC: 5 MMOL/L
AST SERPL-CCNC: 35 U/L
BASOPHILS # BLD AUTO: 0.04 K/UL
BASOPHILS NFR BLD: 0.5 %
BILIRUB DIRECT SERPL-MCNC: 1.5 MG/DL
BILIRUB SERPL-MCNC: 2.2 MG/DL
BUN SERPL-MCNC: 21 MG/DL
CALCIUM SERPL-MCNC: 9.3 MG/DL
CHLORIDE SERPL-SCNC: 108 MMOL/L
CO2 SERPL-SCNC: 25 MMOL/L
CREAT SERPL-MCNC: 0.9 MG/DL
DIFFERENTIAL METHOD: ABNORMAL
EOSINOPHIL # BLD AUTO: 0.2 K/UL
EOSINOPHIL NFR BLD: 1.7 %
ERYTHROCYTE [DISTWIDTH] IN BLOOD BY AUTOMATED COUNT: 18.4 %
EST. GFR  (AFRICAN AMERICAN): >60 ML/MIN/1.73 M^2
EST. GFR  (NON AFRICAN AMERICAN): >60 ML/MIN/1.73 M^2
GLUCOSE SERPL-MCNC: 91 MG/DL
HCT VFR BLD AUTO: 30.5 %
HGB BLD-MCNC: 9.9 G/DL
IMM GRANULOCYTES # BLD AUTO: 0.06 K/UL
IMM GRANULOCYTES NFR BLD AUTO: 0.7 %
LYMPHOCYTES # BLD AUTO: 1.7 K/UL
LYMPHOCYTES NFR BLD: 19.2 %
MCH RBC QN AUTO: 31.4 PG
MCHC RBC AUTO-ENTMCNC: 32.5 G/DL
MCV RBC AUTO: 97 FL
MONOCYTES # BLD AUTO: 0.7 K/UL
MONOCYTES NFR BLD: 8.3 %
NEUTROPHILS # BLD AUTO: 6.2 K/UL
NEUTROPHILS NFR BLD: 69.6 %
NRBC BLD-RTO: 0 /100 WBC
PLATELET # BLD AUTO: 167 K/UL
PMV BLD AUTO: 10.9 FL
POTASSIUM SERPL-SCNC: 5.5 MMOL/L
PROT SERPL-MCNC: 5.6 G/DL
RBC # BLD AUTO: 3.15 M/UL
SODIUM SERPL-SCNC: 138 MMOL/L
TACROLIMUS BLD-MCNC: 7.9 NG/ML
WBC # BLD AUTO: 8.84 K/UL

## 2018-01-06 PROCEDURE — 85025 COMPLETE CBC W/AUTO DIFF WBC: CPT

## 2018-01-06 PROCEDURE — 82248 BILIRUBIN DIRECT: CPT

## 2018-01-06 PROCEDURE — 36415 COLL VENOUS BLD VENIPUNCTURE: CPT

## 2018-01-06 PROCEDURE — 80053 COMPREHEN METABOLIC PANEL: CPT

## 2018-01-06 PROCEDURE — 80197 ASSAY OF TACROLIMUS: CPT

## 2018-01-06 NOTE — TELEPHONE ENCOUNTER
"  Reason for Disposition   Caller has already spoken to PCP or another triager    Answer Assessment - Initial Assessment Questions  1. REASON FOR CALL or QUESTION: "What is your reason for calling today?" or "How can I best help you?" or "What question do you have that I can help answer?"      Patient request to notified transplant coordinator plans for travel to sister's home as instructed. States she had spoken with coordinator prior to OOC call; no symptoms and no further questions at time of call.    Protocols used: ST INFORMATION ONLY CALL-A-AH    "

## 2018-01-06 NOTE — PROGRESS NOTES
Follow up wound vac dressing change performed  Chevron wound openings communicate under medial staple line. Right wound tracts medially and laterally. Left wound opening tracts medially . Vac dressing changed after wound irrigation performed. Pt tolerated well with premedication for pain. Achieved good seal and wound vac remains at 125mmHg suction.   Brett Barrientos RN CWON  [id number]

## 2018-01-06 NOTE — TELEPHONE ENCOUNTER
ON CALL NOTE    Labs drawn today, including elevated K (5.5) reviewed w/ Dr. Delarosa.    Per Dr. Delarosa's recommendation, no need to change medication regimen.  Patient to repeat labs on Monday.  Called patient to inform her of lab results and MD recommendations.  She voiced understanding.  Discussed elevated K.  Medications in Epic reviewed. Potassium supplement prescribed when taking lasix.  Asked patient if she is taking K, she reports that she is unsure.  Since lasix stopped at discharge, informed her that she should not take K.  She voiced understanding. Lab appt previously scheduled 1/7, cancelled.  -------------------------------------------------------------------  Later received phone call from pt's caregiver.  She reports that patient is not taking K (not on the blue card).  Discussed the importance of limiting K rich foods for now.  Understanding verbalized.

## 2018-01-06 NOTE — NURSING
Pt received all discharge paperwork. Connect to home wound vac. Supplies at bedside. Pt and pt friend educated on wound vac and supplies. Verbalized understanding. Pt verbalized labwork appts and Mondays appt. Daughter at bedside with discharge. Pt left unit via wheelchair.

## 2018-01-08 ENCOUNTER — LAB VISIT (OUTPATIENT)
Dept: LAB | Facility: HOSPITAL | Age: 53
End: 2018-01-08
Attending: SURGERY
Payer: COMMERCIAL

## 2018-01-08 ENCOUNTER — CLINICAL SUPPORT (OUTPATIENT)
Dept: TRANSPLANT | Facility: CLINIC | Age: 53
End: 2018-01-08
Payer: COMMERCIAL

## 2018-01-08 ENCOUNTER — TELEPHONE (OUTPATIENT)
Dept: TRANSPLANT | Facility: HOSPITAL | Age: 53
End: 2018-01-08

## 2018-01-08 ENCOUNTER — TELEPHONE (OUTPATIENT)
Dept: TRANSPLANT | Facility: CLINIC | Age: 53
End: 2018-01-08

## 2018-01-08 VITALS
OXYGEN SATURATION: 100 % | DIASTOLIC BLOOD PRESSURE: 86 MMHG | SYSTOLIC BLOOD PRESSURE: 134 MMHG | TEMPERATURE: 99 F | RESPIRATION RATE: 17 BRPM | HEART RATE: 85 BPM

## 2018-01-08 DIAGNOSIS — Z94.4 LIVER REPLACED BY TRANSPLANT: ICD-10-CM

## 2018-01-08 DIAGNOSIS — Z94.4 LIVER REPLACED BY TRANSPLANT: Primary | ICD-10-CM

## 2018-01-08 LAB
ALBUMIN SERPL BCP-MCNC: 2.9 G/DL
ALP SERPL-CCNC: 142 U/L
ALT SERPL W/O P-5'-P-CCNC: 79 U/L
ANION GAP SERPL CALC-SCNC: 5 MMOL/L
AST SERPL-CCNC: 19 U/L
BASOPHILS # BLD AUTO: 0.07 K/UL
BASOPHILS NFR BLD: 0.8 %
BILIRUB DIRECT SERPL-MCNC: 1.5 MG/DL
BILIRUB SERPL-MCNC: 2.3 MG/DL
BUN SERPL-MCNC: 16 MG/DL
CALCIUM SERPL-MCNC: 9.1 MG/DL
CHLORIDE SERPL-SCNC: 107 MMOL/L
CO2 SERPL-SCNC: 25 MMOL/L
CREAT SERPL-MCNC: 0.8 MG/DL
DIFFERENTIAL METHOD: ABNORMAL
EOSINOPHIL # BLD AUTO: 0.1 K/UL
EOSINOPHIL NFR BLD: 1 %
ERYTHROCYTE [DISTWIDTH] IN BLOOD BY AUTOMATED COUNT: 18.6 %
EST. GFR  (AFRICAN AMERICAN): >60 ML/MIN/1.73 M^2
EST. GFR  (NON AFRICAN AMERICAN): >60 ML/MIN/1.73 M^2
GLUCOSE SERPL-MCNC: 76 MG/DL
HCT VFR BLD AUTO: 28.4 %
HGB BLD-MCNC: 9.3 G/DL
IMM GRANULOCYTES # BLD AUTO: 0.07 K/UL
IMM GRANULOCYTES NFR BLD AUTO: 0.8 %
LYMPHOCYTES # BLD AUTO: 1.9 K/UL
LYMPHOCYTES NFR BLD: 21 %
MCH RBC QN AUTO: 31.3 PG
MCHC RBC AUTO-ENTMCNC: 32.7 G/DL
MCV RBC AUTO: 96 FL
MONOCYTES # BLD AUTO: 0.8 K/UL
MONOCYTES NFR BLD: 8.7 %
NEUTROPHILS # BLD AUTO: 6 K/UL
NEUTROPHILS NFR BLD: 67.7 %
NRBC BLD-RTO: 0 /100 WBC
PLATELET # BLD AUTO: 233 K/UL
PMV BLD AUTO: 10.4 FL
POTASSIUM SERPL-SCNC: 4.2 MMOL/L
PROT SERPL-MCNC: 5.5 G/DL
RBC # BLD AUTO: 2.97 M/UL
SODIUM SERPL-SCNC: 137 MMOL/L
TACROLIMUS BLD-MCNC: 5 NG/ML
WBC # BLD AUTO: 8.92 K/UL

## 2018-01-08 PROCEDURE — 82248 BILIRUBIN DIRECT: CPT

## 2018-01-08 PROCEDURE — 80053 COMPREHEN METABOLIC PANEL: CPT

## 2018-01-08 PROCEDURE — 36415 COLL VENOUS BLD VENIPUNCTURE: CPT

## 2018-01-08 PROCEDURE — 99213 OFFICE O/P EST LOW 20 MIN: CPT | Mod: PBBFAC

## 2018-01-08 PROCEDURE — 85025 COMPLETE CBC W/AUTO DIFF WBC: CPT

## 2018-01-08 PROCEDURE — 99999 PR PBB SHADOW E&M-EST. PATIENT-LVL III: CPT | Mod: PBBFAC,,,

## 2018-01-08 PROCEDURE — 80197 ASSAY OF TACROLIMUS: CPT

## 2018-01-08 NOTE — PROGRESS NOTES
Clinic Note: First Return to Clinic Post-  Liver Transplant    Marcie Bazan  is a 52 y.o. female  S/p   LIVER transplant on 12/26/2017 (Liver) for Alcoholic Cirrhosis.      Met with patient and her caregiver in the clinic to review current medication list.     Current Outpatient Prescriptions   Medication Sig Dispense Refill    aspirin (ECOTRIN) 81 MG EC tablet Take 1 tablet (81 mg total) by mouth once daily. 30 tablet 11    bisacodyl (DULCOLAX) 5 mg EC tablet Take 1-2 tablets (5-10 mg total) by mouth daily as needed for Constipation. 30 tablet 0    famotidine (PEPCID) 20 MG tablet Take 1 tablet (20 mg total) by mouth every evening. 30 tablet 11    HYDROmorphone (DILAUDID) 4 MG tablet Take 0.5-1 tablets (2-4 mg total) by mouth every 3 (three) hours as needed. 40 tablet 0    mycophenolate (CELLCEPT) 250 mg Cap Take 4 capsules (1,000 mg total) by mouth 2 (two) times daily. 240 capsule 2    ondansetron (ZOFRAN-ODT) 4 MG TbDL Take 1 tablet (4 mg total) by mouth every 8 (eight) hours as needed (nausea and vomiting). 12 tablet 0    posaconazole 100 mg TbEC tablet Take 3 tablets (300 mg total) by mouth once daily. Stop on 1/25/18 90 tablet 0    predniSONE (DELTASONE) 10 MG tablet Take 20mg PO QD 1/1-1/7; 15mg PO QD 1/8-1/14; 10mg PO QD 1/15-1/21; 5mg PO QD 1/22-1/28; 2.5mg PO QD 1/29-2/4; Stop on 2/5/18 60 tablet 0    sulfamethoxazole-trimethoprim 400-80mg (BACTRIM,SEPTRA) 400-80 mg per tablet Take 1 tablet by mouth once daily. Stop on 6/24/18 30 tablet 5    tacrolimus (PROGRAF) 1 MG Cap Take 1 capsule (1 mg total) by mouth every 12 (twelve) hours. 60 capsule 11    traZODone (DESYREL) 50 MG tablet Take 0.5 tablets (25 mg total) by mouth every evening. 15 tablet 11    valGANciclovir (VALCYTE) 450 mg Tab Take 1 tablet (450 mg total) by mouth once daily. Stop on 3/26/18 30 tablet 2     No current facility-administered medications for this visit.          1) Discussed any concerns or problems that the patient  encountered since discharge: Patient experiencing nausea but relieved with PRN zofran.    2) Reconciled the EMR by having the patient verbalize their medications, dose, and frequency.      3) Verified that patient had prescriptions filled after being discharged from the hospital    4) Reviewed vital signs and laboratory values, and evaluated the need to adjust doses of medications.      5) Reinforced medication education conducted in the hospital, including medication indications, dosing, administration, side effects, monitoring-- including timing of immunosuppressant levels.     6) OTHER medication follow-up:  None.    Patient received their FIRST fill of medications from [facility].  Discussed the process for obtaining refills of medications, including verifying the dose and mailing address to have medications delivered.     Marcie and her caregivers verbalized understanding and had the opportunity to ask questions.

## 2018-01-08 NOTE — TELEPHONE ENCOUNTER
Dr Garduno reviewed your labs  Called patient's daughter, no changes in medications.  One of her enzymes is up, so Dr Garduno wants to get an ultrasound and repeat labs tomorrow.

## 2018-01-08 NOTE — PROGRESS NOTES
Met with pt and caregiver today during post Ltx clinic.   Labs drawn today not resulted yet at time of visit, labs from 1/6 had K 5.5. Labs today now show normal potassium level.   Provided pt with list of high and lower potassium foods, serving size with potassium content listed.   Advised limiting servings of highest potassium foods.   Continue Vanilla Boost supplement while appetite is still fair/poor.     Will follow clinic labs.  RD contact info provided.

## 2018-01-08 NOTE — TELEPHONE ENCOUNTER
KARLA received phone call today from pt's daughter Viola who was inquiring about pt's upcoming appointments, voicing concerns about pt's swelling in her foot, and request for renal boost.    KARLA deferred appointment questions and medical questions to pt's RN Lauren Dailey and sent via epic message. KARLA notified daughter that SW will pass concerns to Cassidy and request Coord to call daughter. KARLA contacted Clinic Dietician Sally to inquire where daughter can purchase the renal boost that pt likes that she drank in the hospital. Sally reports that is Novasource Renal which should be available at [employer] and if not daughter can order online from [employer] or [employer]. Sally also reports pt is allowed to drink regular boost as well. KARLA relayed this information to pt's daughter who reports she will try [employer] for Novasource and that pt does not like the taste of the regular boost.     No other issues or concerns noted at this time. KARLA remains available for education, resources, and support as appropriate.

## 2018-01-08 NOTE — TELEPHONE ENCOUNTER
----- Message from Divina Warren LMSW sent at 1/8/2018 12:41 PM CST -----  NESS Benjamin,    I received a call from pt's daughter Viola (ph# [phone]) and she had some medical questions about her mom's appt this morning and questions regarding up-coming schedule/appointmetns. I told her I would reach out to you and have you call her.     She also had questions about renal boost etc that I was able to get in touch with the dietician and answer for her over the phone.     Thanks,  Divina

## 2018-01-09 ENCOUNTER — TELEPHONE (OUTPATIENT)
Dept: TRANSPLANT | Facility: CLINIC | Age: 53
End: 2018-01-09

## 2018-01-09 ENCOUNTER — OFFICE VISIT (OUTPATIENT)
Dept: TRANSPLANT | Facility: CLINIC | Age: 53
End: 2018-01-09
Payer: COMMERCIAL

## 2018-01-09 VITALS
RESPIRATION RATE: 19 BRPM | BODY MASS INDEX: 26.91 KG/M2 | OXYGEN SATURATION: 100 % | HEIGHT: 63 IN | WEIGHT: 151.88 LBS | SYSTOLIC BLOOD PRESSURE: 134 MMHG | HEART RATE: 83 BPM | DIASTOLIC BLOOD PRESSURE: 66 MMHG | TEMPERATURE: 99 F

## 2018-01-09 DIAGNOSIS — Z94.4 LIVER REPLACED BY TRANSPLANT: Primary | ICD-10-CM

## 2018-01-09 DIAGNOSIS — Z29.89 PROPHYLACTIC IMMUNOTHERAPY: ICD-10-CM

## 2018-01-09 DIAGNOSIS — Z51.81 ENCOUNTER FOR THERAPEUTIC DRUG MONITORING: ICD-10-CM

## 2018-01-09 DIAGNOSIS — Z94.4 LIVER REPLACED BY TRANSPLANT: ICD-10-CM

## 2018-01-09 DIAGNOSIS — Z79.60 LONG-TERM USE OF IMMUNOSUPPRESSANT MEDICATION: Chronic | ICD-10-CM

## 2018-01-09 DIAGNOSIS — Z94.4 S/P LIVER TRANSPLANT: Primary | ICD-10-CM

## 2018-01-09 DIAGNOSIS — T81.89XD DELAYED SURGICAL WOUND HEALING, SUBSEQUENT ENCOUNTER: ICD-10-CM

## 2018-01-09 PROCEDURE — 99214 OFFICE O/P EST MOD 30 MIN: CPT | Mod: 24,S$GLB,, | Performed by: TRANSPLANT SURGERY

## 2018-01-09 PROCEDURE — 99213 OFFICE O/P EST LOW 20 MIN: CPT | Mod: PBBFAC

## 2018-01-09 PROCEDURE — 99999 PR PBB SHADOW E&M-EST. PATIENT-LVL III: CPT | Mod: PBBFAC,,,

## 2018-01-09 RX ORDER — TACROLIMUS 1 MG/1
2 CAPSULE ORAL EVERY 12 HOURS
Qty: 120 CAPSULE | Refills: 11 | Status: SHIPPED | OUTPATIENT
Start: 2018-01-09 | End: 2018-01-18 | Stop reason: SDUPTHER

## 2018-01-09 RX ORDER — FUROSEMIDE 20 MG/1
20 TABLET ORAL DAILY
Qty: 60 TABLET | Refills: 0 | Status: SHIPPED | OUTPATIENT
Start: 2018-01-09 | End: 2018-01-09 | Stop reason: SDUPTHER

## 2018-01-09 RX ORDER — FUROSEMIDE 20 MG/1
20 TABLET ORAL DAILY
Qty: 60 TABLET | Refills: 0 | Status: SHIPPED | OUTPATIENT
Start: 2018-01-09 | End: 2018-01-30 | Stop reason: SDUPTHER

## 2018-01-09 NOTE — TELEPHONE ENCOUNTER
----- Message from Scarlett Miller sent at 1/9/2018  9:18 AM CST -----  Contact: Thomas/STEFANIE  Calling to get the name of Home health agency pt uses please call @ # [phone] ext [phone]

## 2018-01-09 NOTE — TELEPHONE ENCOUNTER
Returned call to Thomas at [facility] with information that [facility] will be the HH agency patient will use.

## 2018-01-09 NOTE — PROGRESS NOTES
Transplant Surgery  Liver Transplant Recipient Follow-up    Original Referring Physician: Andrew Bradshaw  Current Corresponding Physician:     Chief Complaint: Marcie is here for follow up of her liver transplant performed 2017 for the primary diagnosis (UNOS) of Alcoholic Cirrhosis    ORGAN: LIVER  Whole or Partial: whole liver  Donor Type:  - brain death  PHS Increased Risk: no  Donor CMV Status: Negative  Donor HCV Status: Negative  Donor HBcAb: Negative  Biliary Anastomosis: end to end  Arterial Anatomy: standard  IVC reconstruction: cavaplasty piggyback  Portal vein status: patent    Subjective:     History of Present Illness: She has had the following complications since transplant: wound dehiscence.  The noted complications are well controlled.    Interval History: Currently, she is doing well.  Current complaints include none.  Marcie is here for management of her immunosuppression medication.    Review of Systems   Constitutional: Negative for activity change, appetite change, chills, fatigue and fever.   HENT: Negative for sore throat and trouble swallowing.    Eyes: Negative for visual disturbance.   Respiratory: Negative for cough, chest tightness and shortness of breath.    Cardiovascular: Negative for chest pain, palpitations and leg swelling.   Gastrointestinal: Negative for abdominal distention, abdominal pain, blood in stool, constipation, diarrhea, nausea and vomiting.   Endocrine: Negative for polyuria.   Genitourinary: Negative for decreased urine volume, difficulty urinating, dysuria, flank pain, frequency and hematuria.   Musculoskeletal: Negative for gait problem, myalgias and neck stiffness.   Skin: Negative for rash and wound.   Neurological: Negative for dizziness, tremors, seizures, weakness, light-headedness and headaches.   Hematological: Negative for adenopathy.   Psychiatric/Behavioral: Negative for agitation, confusion and sleep disturbance.       Objective:      Physical Exam   Constitutional: She is oriented to person, place, and time. She appears well-developed and well-nourished. No distress.   HENT:   Head: Normocephalic.   Mouth/Throat: Oropharynx is clear and moist.   Eyes: Conjunctivae are normal. Pupils are equal, round, and reactive to light. No scleral icterus.   Neck: Normal range of motion. Neck supple. No tracheal deviation present. No thyromegaly present.   Cardiovascular: Normal rate, regular rhythm, normal heart sounds and intact distal pulses.    No murmur heard.  Pulmonary/Chest: Effort normal and breath sounds normal. No respiratory distress.   No supraclavicular adenopathy   Abdominal: Soft. Bowel sounds are normal. She exhibits no distension and no mass. There is no tenderness. There is no rebound and no guarding.       No inguinal adenopathy   Musculoskeletal: Normal range of motion. She exhibits edema.   No clubbing or cyanosis   Lymphadenopathy:     She has no cervical adenopathy.   Neurological: She is alert and oriented to person, place, and time.   Skin: Skin is warm and dry. No rash noted. No erythema.   Psychiatric: She has a normal mood and affect. Her behavior is normal.   Vitals reviewed.    Lab Results   Component Value Date    BILITOT 2.3 (H) 01/08/2018    AST 19 01/08/2018    ALT 79 (H) 01/08/2018    ALKPHOS 142 (H) 01/08/2018    CREATININE 0.8 01/08/2018    ALBUMIN 2.9 (L) 01/08/2018     Lab Results   Component Value Date    WBC 8.92 01/08/2018    HGB 9.3 (L) 01/08/2018    HCT 28.4 (L) 01/08/2018    HCT 28 (L) 12/26/2017     01/08/2018     Lab Results   Component Value Date    TACROLIMUS 5.0 01/08/2018       Assessment/Plan:          · S/P liver transplant.  · Chronic immunosuppressive medications for rejection prophylaxis at target.  Plan: no adjustment needed.  · Continue monitoring symptoms, labs and drug levels for drug-related toxicity and side effects.  · Incision: wound open: partial wound vac  · Femoral arterial line  site: no complications evident   · Start lasix 20mg daily    MD JONO Melvin Patient Status  Functional Status: 50% - Requires considerable assistance and frequent medical care  Physical Capacity: No Limitations

## 2018-01-10 ENCOUNTER — TELEPHONE (OUTPATIENT)
Dept: TRANSPLANT | Facility: CLINIC | Age: 53
End: 2018-01-10

## 2018-01-10 ENCOUNTER — LAB VISIT (OUTPATIENT)
Dept: LAB | Facility: HOSPITAL | Age: 53
End: 2018-01-10
Attending: SURGERY
Payer: COMMERCIAL

## 2018-01-10 DIAGNOSIS — Z94.4 LIVER REPLACED BY TRANSPLANT: ICD-10-CM

## 2018-01-10 LAB
ALBUMIN SERPL BCP-MCNC: 2.9 G/DL
ALP SERPL-CCNC: 129 U/L
ALT SERPL W/O P-5'-P-CCNC: 53 U/L
ANION GAP SERPL CALC-SCNC: 8 MMOL/L
AST SERPL-CCNC: 14 U/L
BASOPHILS # BLD AUTO: 0.12 K/UL
BASOPHILS NFR BLD: 1.3 %
BILIRUB DIRECT SERPL-MCNC: 1.4 MG/DL
BILIRUB SERPL-MCNC: 2.1 MG/DL
BUN SERPL-MCNC: 15 MG/DL
CALCIUM SERPL-MCNC: 9 MG/DL
CHLORIDE SERPL-SCNC: 109 MMOL/L
CO2 SERPL-SCNC: 23 MMOL/L
CREAT SERPL-MCNC: 0.8 MG/DL
DIFFERENTIAL METHOD: ABNORMAL
EOSINOPHIL # BLD AUTO: 0.2 K/UL
EOSINOPHIL NFR BLD: 2 %
ERYTHROCYTE [DISTWIDTH] IN BLOOD BY AUTOMATED COUNT: 18.4 %
EST. GFR  (AFRICAN AMERICAN): >60 ML/MIN/1.73 M^2
EST. GFR  (NON AFRICAN AMERICAN): >60 ML/MIN/1.73 M^2
GLUCOSE SERPL-MCNC: 71 MG/DL
HCT VFR BLD AUTO: 29.2 %
HGB BLD-MCNC: 9.4 G/DL
IMM GRANULOCYTES # BLD AUTO: 0.06 K/UL
IMM GRANULOCYTES NFR BLD AUTO: 0.7 %
LYMPHOCYTES # BLD AUTO: 2.1 K/UL
LYMPHOCYTES NFR BLD: 23 %
MCH RBC QN AUTO: 31.3 PG
MCHC RBC AUTO-ENTMCNC: 32.2 G/DL
MCV RBC AUTO: 97 FL
MONOCYTES # BLD AUTO: 0.7 K/UL
MONOCYTES NFR BLD: 7.4 %
NEUTROPHILS # BLD AUTO: 6 K/UL
NEUTROPHILS NFR BLD: 65.6 %
NRBC BLD-RTO: 0 /100 WBC
PLATELET # BLD AUTO: 260 K/UL
PMV BLD AUTO: 9.7 FL
POTASSIUM SERPL-SCNC: 3.6 MMOL/L
PROT SERPL-MCNC: 5.7 G/DL
RBC # BLD AUTO: 3 M/UL
SODIUM SERPL-SCNC: 140 MMOL/L
TACROLIMUS BLD-MCNC: 7.7 NG/ML
WBC # BLD AUTO: 9.08 K/UL

## 2018-01-10 PROCEDURE — 82248 BILIRUBIN DIRECT: CPT

## 2018-01-10 PROCEDURE — 85025 COMPLETE CBC W/AUTO DIFF WBC: CPT

## 2018-01-10 PROCEDURE — 80053 COMPREHEN METABOLIC PANEL: CPT

## 2018-01-10 PROCEDURE — 80197 ASSAY OF TACROLIMUS: CPT

## 2018-01-10 PROCEDURE — 36415 COLL VENOUS BLD VENIPUNCTURE: CPT

## 2018-01-10 NOTE — TELEPHONE ENCOUNTER
Dr. Delarosa reviewed your labs.  Called patient's daughter Segundo to let her know labs stable no changes.  She will repeat labs Monday.

## 2018-01-10 NOTE — TELEPHONE ENCOUNTER
----- Message from Harry Delarosa MD sent at 1/10/2018  2:44 PM CST -----  Results ok. No action needed

## 2018-01-11 ENCOUNTER — CONFERENCE (OUTPATIENT)
Dept: TRANSPLANT | Facility: CLINIC | Age: 53
End: 2018-01-11

## 2018-01-11 ENCOUNTER — CLINICAL SUPPORT (OUTPATIENT)
Dept: REHABILITATION | Facility: HOSPITAL | Age: 53
End: 2018-01-11
Attending: NURSE PRACTITIONER
Payer: COMMERCIAL

## 2018-01-11 DIAGNOSIS — R53.1 WEAKNESS: ICD-10-CM

## 2018-01-11 PROCEDURE — 97161 PT EVAL LOW COMPLEX 20 MIN: CPT

## 2018-01-12 NOTE — PLAN OF CARE
OUTPATIENT PHYSICAL THERAPY  PHYSICAL THERAPY EVALUATION    Name: Marcie Bazan  Clinic Number: [id number]    Diagnosis:   Encounter Diagnosis   Name Primary?    Weakness      Physician: Devon Robles NP  Treatment Orders: PT Eval and Treat  Past Medical History:   Diagnosis Date    Alcohol abuse     Alcoholic hepatitis     Anemia of chronic disease     Coagulopathy     End stage liver disease     Hypertension     Hyponatremia     Liver cirrhosis     Liver transplant candidate     Obesity (BMI 30-39.9) 1/5/2016     Current Outpatient Prescriptions   Medication Sig    aspirin (ECOTRIN) 81 MG EC tablet Take 1 tablet (81 mg total) by mouth once daily.    bisacodyl (DULCOLAX) 5 mg EC tablet Take 1-2 tablets (5-10 mg total) by mouth daily as needed for Constipation.    famotidine (PEPCID) 20 MG tablet Take 1 tablet (20 mg total) by mouth every evening.    furosemide (LASIX) 20 MG tablet Take 1 tablet (20 mg total) by mouth once daily.    HYDROmorphone (DILAUDID) 4 MG tablet Take 0.5-1 tablets (2-4 mg total) by mouth every 3 (three) hours as needed.    mycophenolate (CELLCEPT) 250 mg Cap Take 4 capsules (1,000 mg total) by mouth 2 (two) times daily.    ondansetron (ZOFRAN-ODT) 4 MG TbDL Take 1 tablet (4 mg total) by mouth every 8 (eight) hours as needed (nausea and vomiting).    posaconazole 100 mg TbEC tablet Take 3 tablets (300 mg total) by mouth once daily. Stop on 1/25/18    predniSONE (DELTASONE) 10 MG tablet Take 20mg PO QD 1/1-1/7; 15mg PO QD 1/8-1/14; 10mg PO QD 1/15-1/21; 5mg PO QD 1/22-1/28; 2.5mg PO QD 1/29-2/4; Stop on 2/5/18    sulfamethoxazole-trimethoprim 400-80mg (BACTRIM,SEPTRA) 400-80 mg per tablet Take 1 tablet by mouth once daily. Stop on 6/24/18    tacrolimus (PROGRAF) 1 MG Cap Take 2 capsules (2 mg total) by mouth every 12 (twelve) hours.    traZODone (DESYREL) 50 MG tablet Take 0.5 tablets (25 mg total) by mouth every evening.    valGANciclovir (VALCYTE) 450 mg Tab Take  1 tablet (450 mg total) by mouth once daily. Stop on 3/26/18     No current facility-administered medications for this visit.      Review of patient's allergies indicates:   Allergen Reactions    Ferrous sulfate Other (See Comments)     Patient states the pill makes her sick. She stated she would rather have a shot       Evaluation Date: 1/11/17  Visit # authorized: 1/20  Authorization period: based on medical necessity    History   Precautions: s/p liver transplant 12/26/17  Prior Therapy: no  Nutrition:  Malnourished  History of Present Illness: Per chart, liver transplant performed 12/26/2017 for the primary diagnosis (UNOS) of Alcoholic Cirrhosis. She had wound dehiscence following surgery. She currently has a wound vac. Per pt, she is having wound care every other day at home. Pt reports she is in a lot of pain at the wound site. She is taking pain medication to control the pain. She reports she has been very weak since the procedure. She needs assistance from family members for transfers, using the bathroom, and ambulation around the home. She uses a rolling walker at home. Prior to the procedure she was independent with all ADLs and mobility. She does not work. She has several grandchildren living at home and she cooks for them and provides their transportation. She is currently unable to do this. Her goal for PT is to get her strength back and be able to perform all her ADLs independently as well as walk independently. She also wants to be able to stand up so she can cook for her family.   Place of Residence (Steps/Adaptations): apartment; no steps  Previous functional status includes: independent with ADLs and mobility  Current functional status:  Requires assistance for transfers and mobility  Exercise routine prior to onset : none  DME owned: rolling walker  Work:  Not employed                         Subjective     Pts goals:  to get her strength back and be able to perform all her ADLs independently  as well as walk independently. She also wants to be able to stand up so she can cook for her family  Pain:  Pain at wound site, rates 7/10 currently   Onset of pain /Mechanism of Onset:  See history  Chief complaint:  Weakness       Objective     Mental status :alert, oriented  Posture Alignment :pt sitting with forward flexed posture in wheelchair, trembling throughout evaluation     ROM: LE ROM WFL, assessed seated in wheelchair  UE ROM not assessed today           Upper Extremity Strength  Did not formally assess upper extremities at time of eval    Lower Extremity Strength: assessed in wheelchair   Right LE  Left LE    Knee extension: 3+/5 Knee extension: 3+/5   Knee flexion: 3+/5 Knee flexion: 3+/5   Hip flexion: 3+/5 Hip flexion: 3+/5   Hip extension:  2/5 Hip extension: 2/5   Hip abduction: 3/5 Hip abduction: 3/5   Ankle dorsiflexion:   3+/5 Ankle dorsiflexion:   3+/5     GAIT: Marcie was able to ambulate with min hand held assist x25 feet. Required seated rest break after 25 feet 2/2 to fatigue.     GAIT DEVIATIONS: Marcie displays unsteady gait    Transfers: sit>stand from wheelchair required min A at start of eval. At end of eval required mod A as pt was very fatigued.     Pt/family was provided educational information, including: role of PT, goals for PT, scheduling - pt verbalized understanding. Discussed insurance limitations with pt.     Exercises were reviewed and pt was able to demonstrate them prior to the end of the session. Pt received a written copy of exercises to perform at home.  Pt has no cultural, educational or language barriers to learning provided.    Treatment today also included:   Pt was exhausted at completion of eval. Unable to complete any therex today. Sent pt home with seated wheelchair strengthening program, and encouraged pt to walk frequently with rolling walker and family members around her apt.     Assessment   Pt presents to physical therapy with diagnosis of weakness s/p  liver transplant on 12/26/17.  Pt's c/c is weakness and decreased independence with ADLs and mobility.  Pt presents with pain, generalized deconditioning/weakness, impaired gait and balance, and decreased independence with ADLs and functional mobility.  Pt prognosis is Good.  Pt will benefit from skilled outpatient physical therapy, specifically therex to focus on strength and endurance, gait training, balance training, home exercise program, to address the above stated deficits, provide pt/family education and to maximize pt's level of independence.     Medical necessity is demonstrated by the following IMPAIRMENTS/PROBLEMS:  1. weakness  2. pain  3. Decreased endurance  4. Lack of HEP  5. Impaired gait  6. Impaired balance  7. Decreased independence with ADLs and functional mobility      Pt's spiritual, cultural and educational needs considered and pt agreeable to plan of care and goals as stated below:     Anticipated Barriers for physical therapy:     Short Term GOALS: 3 weeks. Pt agrees with goals set.  1. Pt will walk 75 feet with or without AD independently without need for rest break in order to demonstrate increased independence and endurance for functional mobility around the home  2. Pt will increase strength by 1/3 MMT grade in BLE in order to perform ADLs with decreased difficulty.  3. Pt will perform 5 min of standing therex without need for rest break in order to show increased endurance to standing position required for cooking at home  4. Pt will be I with HEP.    Long Term GOALS: 8-10  weeks. Pt agrees with goals set.  1. Pt will ambulate 200 feet independently without rest break in order to show increased independence and endurance to functional mobility in home and community  2. Pt will increase strength to >/= 4/5 MMT grade in BLE in order to perform ADLs without difficulty.  3. Pt will perform 20 min of standing therex without need for rest break in order to show increased endurance to standing  position required for cooking at home  3. Pt's goal:  Independent with all ADLs and mobility, be able to cook for family      Plan     Outpatient physical therapy 1- 2 times weekly to include: pt ed, hep, therapeutic exercises, therapeutic activities, neuromuscular re-education/ balance exercises, joint mobilizations, aquatic therapy and modalities prn. Pt may be seen by PTA as part of the rehabilitation team.     Cont PT for  8-10 weeks.    Shadi Jacobson, PT, DPT      I certify the need for these services furnished under this plan of treatment and while under my care.  ____________________________________ Physician/Referring Practitioner   Date of Signature

## 2018-01-15 ENCOUNTER — TELEPHONE (OUTPATIENT)
Dept: TRANSPLANT | Facility: CLINIC | Age: 53
End: 2018-01-15

## 2018-01-15 ENCOUNTER — LAB VISIT (OUTPATIENT)
Dept: LAB | Facility: HOSPITAL | Age: 53
End: 2018-01-15
Attending: SURGERY
Payer: COMMERCIAL

## 2018-01-15 DIAGNOSIS — Z94.4 LIVER REPLACED BY TRANSPLANT: ICD-10-CM

## 2018-01-15 LAB
ALBUMIN SERPL BCP-MCNC: 2.8 G/DL
ALP SERPL-CCNC: 113 U/L
ALT SERPL W/O P-5'-P-CCNC: 35 U/L
ANION GAP SERPL CALC-SCNC: 9 MMOL/L
AST SERPL-CCNC: 15 U/L
BASOPHILS # BLD AUTO: 0.02 K/UL
BASOPHILS NFR BLD: 0.3 %
BILIRUB DIRECT SERPL-MCNC: 1.1 MG/DL
BILIRUB SERPL-MCNC: 1.6 MG/DL
BUN SERPL-MCNC: 15 MG/DL
CALCIUM SERPL-MCNC: 8.2 MG/DL
CHLORIDE SERPL-SCNC: 108 MMOL/L
CO2 SERPL-SCNC: 21 MMOL/L
CREAT SERPL-MCNC: 0.8 MG/DL
DIFFERENTIAL METHOD: ABNORMAL
EOSINOPHIL # BLD AUTO: 0.1 K/UL
EOSINOPHIL NFR BLD: 1 %
ERYTHROCYTE [DISTWIDTH] IN BLOOD BY AUTOMATED COUNT: 17.4 %
EST. GFR  (AFRICAN AMERICAN): >60 ML/MIN/1.73 M^2
EST. GFR  (NON AFRICAN AMERICAN): >60 ML/MIN/1.73 M^2
GLUCOSE SERPL-MCNC: 72 MG/DL
HCT VFR BLD AUTO: 28.7 %
HGB BLD-MCNC: 9.4 G/DL
IMM GRANULOCYTES # BLD AUTO: 0.03 K/UL
IMM GRANULOCYTES NFR BLD AUTO: 0.5 %
LYMPHOCYTES # BLD AUTO: 1.8 K/UL
LYMPHOCYTES NFR BLD: 30.1 %
MCH RBC QN AUTO: 30.5 PG
MCHC RBC AUTO-ENTMCNC: 32.8 G/DL
MCV RBC AUTO: 93 FL
MONOCYTES # BLD AUTO: 0.3 K/UL
MONOCYTES NFR BLD: 5 %
NEUTROPHILS # BLD AUTO: 3.8 K/UL
NEUTROPHILS NFR BLD: 63.1 %
NRBC BLD-RTO: 0 /100 WBC
PLATELET # BLD AUTO: 225 K/UL
PMV BLD AUTO: 9.9 FL
POTASSIUM SERPL-SCNC: 2.8 MMOL/L
PROT SERPL-MCNC: 5.5 G/DL
RBC # BLD AUTO: 3.08 M/UL
SODIUM SERPL-SCNC: 138 MMOL/L
TACROLIMUS BLD-MCNC: 18 NG/ML
WBC # BLD AUTO: 5.95 K/UL

## 2018-01-15 PROCEDURE — 85025 COMPLETE CBC W/AUTO DIFF WBC: CPT

## 2018-01-15 PROCEDURE — 36415 COLL VENOUS BLD VENIPUNCTURE: CPT

## 2018-01-15 PROCEDURE — 80197 ASSAY OF TACROLIMUS: CPT

## 2018-01-15 PROCEDURE — 82248 BILIRUBIN DIRECT: CPT

## 2018-01-15 PROCEDURE — 80053 COMPREHEN METABOLIC PANEL: CPT

## 2018-01-15 NOTE — TELEPHONE ENCOUNTER
Dr. Delarosa reviewed your labs.  Called patient to let her know to hold Prograf dose tonight, do labs in the morning.  We will see her in clinic and let her know what dose to restart at. Spoke to her son about holding the medication.

## 2018-01-15 NOTE — TELEPHONE ENCOUNTER
----- Message from Lesley Finnegan sent at 1/15/2018  3:05 PM CST -----  Contact: Pt daughter-Viola  Calling to speak with Cassidy, pt daughter has a couple of questions she would like to ask her.        Call back number: [phone]

## 2018-01-15 NOTE — TELEPHONE ENCOUNTER
----- Message from Harry Delarosa MD sent at 1/15/2018  1:57 PM CST -----  Hold and level tomorrow

## 2018-01-16 ENCOUNTER — OFFICE VISIT (OUTPATIENT)
Dept: TRANSPLANT | Facility: CLINIC | Age: 53
End: 2018-01-16
Payer: COMMERCIAL

## 2018-01-16 ENCOUNTER — CLINICAL SUPPORT (OUTPATIENT)
Dept: REHABILITATION | Facility: HOSPITAL | Age: 53
End: 2018-01-16
Attending: NURSE PRACTITIONER
Payer: COMMERCIAL

## 2018-01-16 ENCOUNTER — LAB VISIT (OUTPATIENT)
Dept: LAB | Facility: HOSPITAL | Age: 53
End: 2018-01-16
Attending: SURGERY
Payer: COMMERCIAL

## 2018-01-16 ENCOUNTER — OFFICE VISIT (OUTPATIENT)
Dept: SURGERY | Facility: CLINIC | Age: 53
End: 2018-01-16
Payer: COMMERCIAL

## 2018-01-16 ENCOUNTER — TELEPHONE (OUTPATIENT)
Dept: TRANSPLANT | Facility: CLINIC | Age: 53
End: 2018-01-16

## 2018-01-16 VITALS
BODY MASS INDEX: 26.72 KG/M2 | WEIGHT: 150.81 LBS | SYSTOLIC BLOOD PRESSURE: 140 MMHG | HEIGHT: 63 IN | DIASTOLIC BLOOD PRESSURE: 71 MMHG | HEART RATE: 88 BPM

## 2018-01-16 VITALS
WEIGHT: 150.81 LBS | OXYGEN SATURATION: 100 % | RESPIRATION RATE: 17 BRPM | SYSTOLIC BLOOD PRESSURE: 151 MMHG | DIASTOLIC BLOOD PRESSURE: 86 MMHG | HEART RATE: 77 BPM | TEMPERATURE: 98 F | HEIGHT: 63 IN | BODY MASS INDEX: 26.72 KG/M2

## 2018-01-16 DIAGNOSIS — R53.1 WEAKNESS: ICD-10-CM

## 2018-01-16 DIAGNOSIS — Z94.4 LIVER REPLACED BY TRANSPLANT: ICD-10-CM

## 2018-01-16 DIAGNOSIS — Z79.60 LONG-TERM USE OF IMMUNOSUPPRESSANT MEDICATION: ICD-10-CM

## 2018-01-16 DIAGNOSIS — K64.1 GRADE II HEMORRHOIDS: ICD-10-CM

## 2018-01-16 DIAGNOSIS — Z51.81 ENCOUNTER FOR THERAPEUTIC DRUG MONITORING: ICD-10-CM

## 2018-01-16 DIAGNOSIS — T81.89XD DELAYED SURGICAL WOUND HEALING, SUBSEQUENT ENCOUNTER: ICD-10-CM

## 2018-01-16 DIAGNOSIS — Z94.4 LIVER REPLACED BY TRANSPLANT: Primary | ICD-10-CM

## 2018-01-16 DIAGNOSIS — Z94.4 S/P LIVER TRANSPLANT: Primary | ICD-10-CM

## 2018-01-16 LAB
ALBUMIN SERPL BCP-MCNC: 2.8 G/DL
ALP SERPL-CCNC: 123 U/L
ALT SERPL W/O P-5'-P-CCNC: 33 U/L
ANION GAP SERPL CALC-SCNC: 8 MMOL/L
AST SERPL-CCNC: 16 U/L
BASOPHILS # BLD AUTO: 0.03 K/UL
BASOPHILS NFR BLD: 0.6 %
BILIRUB SERPL-MCNC: 1.7 MG/DL
BUN SERPL-MCNC: 15 MG/DL
CALCIUM SERPL-MCNC: 8.4 MG/DL
CHLORIDE SERPL-SCNC: 110 MMOL/L
CO2 SERPL-SCNC: 23 MMOL/L
CREAT SERPL-MCNC: 0.9 MG/DL
DIFFERENTIAL METHOD: ABNORMAL
EOSINOPHIL # BLD AUTO: 0.1 K/UL
EOSINOPHIL NFR BLD: 1.5 %
ERYTHROCYTE [DISTWIDTH] IN BLOOD BY AUTOMATED COUNT: 17.3 %
EST. GFR  (AFRICAN AMERICAN): >60 ML/MIN/1.73 M^2
EST. GFR  (NON AFRICAN AMERICAN): >60 ML/MIN/1.73 M^2
GLUCOSE SERPL-MCNC: 78 MG/DL
HCT VFR BLD AUTO: 29.1 %
HGB BLD-MCNC: 9.4 G/DL
IMM GRANULOCYTES # BLD AUTO: 0.02 K/UL
IMM GRANULOCYTES NFR BLD AUTO: 0.4 %
LYMPHOCYTES # BLD AUTO: 1.5 K/UL
LYMPHOCYTES NFR BLD: 30.9 %
MCH RBC QN AUTO: 30.5 PG
MCHC RBC AUTO-ENTMCNC: 32.3 G/DL
MCV RBC AUTO: 95 FL
MONOCYTES # BLD AUTO: 0.3 K/UL
MONOCYTES NFR BLD: 6 %
NEUTROPHILS # BLD AUTO: 2.9 K/UL
NEUTROPHILS NFR BLD: 60.6 %
NRBC BLD-RTO: 0 /100 WBC
PLATELET # BLD AUTO: 206 K/UL
PMV BLD AUTO: 9.9 FL
POTASSIUM SERPL-SCNC: 2.8 MMOL/L
PROT SERPL-MCNC: 5.7 G/DL
RBC # BLD AUTO: 3.08 M/UL
SODIUM SERPL-SCNC: 141 MMOL/L
TACROLIMUS BLD-MCNC: 13 NG/ML
WBC # BLD AUTO: 4.82 K/UL

## 2018-01-16 PROCEDURE — 80197 ASSAY OF TACROLIMUS: CPT

## 2018-01-16 PROCEDURE — 46600 DIAGNOSTIC ANOSCOPY SPX: CPT | Mod: PBBFAC | Performed by: COLON & RECTAL SURGERY

## 2018-01-16 PROCEDURE — 99203 OFFICE O/P NEW LOW 30 MIN: CPT | Mod: 25,S$GLB,, | Performed by: COLON & RECTAL SURGERY

## 2018-01-16 PROCEDURE — 99212 OFFICE O/P EST SF 10 MIN: CPT | Mod: PBBFAC,25 | Performed by: COLON & RECTAL SURGERY

## 2018-01-16 PROCEDURE — 36415 COLL VENOUS BLD VENIPUNCTURE: CPT

## 2018-01-16 PROCEDURE — 97802 MEDICAL NUTRITION INDIV IN: CPT | Mod: S$GLB,,, | Performed by: DIETITIAN, REGISTERED

## 2018-01-16 PROCEDURE — 97110 THERAPEUTIC EXERCISES: CPT | Performed by: PHYSICAL THERAPIST

## 2018-01-16 PROCEDURE — 99999 PR PBB SHADOW E&M-EST. PATIENT-LVL III: CPT | Mod: PBBFAC,,,

## 2018-01-16 PROCEDURE — 80053 COMPREHEN METABOLIC PANEL: CPT

## 2018-01-16 PROCEDURE — 46600 DIAGNOSTIC ANOSCOPY SPX: CPT | Mod: S$GLB,,, | Performed by: COLON & RECTAL SURGERY

## 2018-01-16 PROCEDURE — 99999 PR PBB SHADOW E&M-EST. PATIENT-LVL II: CPT | Mod: PBBFAC,,, | Performed by: COLON & RECTAL SURGERY

## 2018-01-16 PROCEDURE — 99215 OFFICE O/P EST HI 40 MIN: CPT | Mod: 24,S$GLB,, | Performed by: TRANSPLANT SURGERY

## 2018-01-16 PROCEDURE — 85025 COMPLETE CBC W/AUTO DIFF WBC: CPT

## 2018-01-16 RX ORDER — POTASSIUM CHLORIDE 1.5 G/1.58G
40 POWDER, FOR SOLUTION ORAL DAILY
Qty: 6 PACKET | Refills: 0 | Status: SHIPPED | OUTPATIENT
Start: 2018-01-16 | End: 2018-01-23 | Stop reason: SDUPTHER

## 2018-01-16 RX ORDER — HYDROMORPHONE HYDROCHLORIDE 4 MG/1
2-4 TABLET ORAL EVERY 4 HOURS PRN
Qty: 35 TABLET | Refills: 0 | Status: SHIPPED | OUTPATIENT
Start: 2018-01-16 | End: 2018-02-02 | Stop reason: SDUPTHER

## 2018-01-16 NOTE — TELEPHONE ENCOUNTER
Returned call addressed questions about pain medication and her daughter having Roto Virus preventative medication.  Advised that her mom should not be around baby until about 2 weeks after she takes the medication.  It is important for her to get immunizations.

## 2018-01-16 NOTE — LETTER
January 20, 2018      José Chavira MD  [address]  [facility] [address]           [facility]  [address]  [facility] [address]  Phone: [phone]          Patient: Marcie Bazan   MR Number: [id number]   YOB: 1965   Date of Visit: 1/16/2018       Dear Dr. José Chavira:    Thank you for referring Marcie Bazan to me for evaluation. Attached you will find relevant portions of my assessment and plan of care.    If you have questions, please do not hesitate to call me. I look forward to following Marcie Bazan along with you.    Sincerely,    IVANIA Zapata MD    Enclosure  CC:  No Recipients    If you would like to receive this communication electronically, please contact [email or web address] or [phone] to request more information on [employer] Link access.    For providers and/or their staff who would like to refer a patient to [facility], please contact us through our one-stop-shop provider referral line, [facility] , at [phone].    If you feel you have received this communication in error or would no longer like to receive these types of communications, please e-mail [email or web address]

## 2018-01-16 NOTE — PROGRESS NOTES
CRS Office Visit    SUBJECTIVE:     Chief Complaint: History of hemorrhoids and posterior anal fissure    History of Present Illness:  Patient is a 52 y.o. female who is s/p liver TXP 12/26/2017 who, on preop colonoscopy was found to have enlarged internal hemorrhoids and posterior anal fissure. Prior to surgery she had a history of severe constipation. History of perianal pain both during and after bowel movements which has now resolved. She uses Preparation H and Robert's Butt Paste after each bowel movement. She states she is regular and has one BM daily. She denies bleeding with bowel movements. Otherwise is doing well.    Review of patient's allergies indicates:   Allergen Reactions    Ferrous sulfate Other (See Comments)     Patient states the pill makes her sick. She stated she would rather have a shot       Past Medical History:   Diagnosis Date    Alcohol abuse     Alcoholic hepatitis     Anemia of chronic disease     Coagulopathy     End stage liver disease     Hypertension     Hyponatremia     Liver cirrhosis     Liver transplant candidate     Obesity (BMI 30-39.9) 1/5/2016     Past Surgical History:   Procedure Laterality Date    BREAST BIOPSY      CHOLECYSTECTOMY      COLONOSCOPY N/A 12/1/2017    Procedure: COLONOSCOPY;  Surgeon: Filemon Fuentes MD;  Location: [facility] ([address] [place] [address]);  Service: Endoscopy;  Laterality: N/A;    COLONOSCOPY N/A 12/5/2017    Procedure: COLONOSCOPY;  Surgeon: José Chavira MD;  Location: [facility] ([address] [place] [address]);  Service: Endoscopy;  Laterality: N/A;    HYSTERECTOMY      LIVER TRANSPLANT  12/2017     Family History   Problem Relation Age of Onset    Hypertension Mother     Hypertension Father     Diabetes Father     Diabetes Sister     Depression Maternal Grandfather     Breast cancer Paternal Aunt      Social History   Substance Use Topics    Smoking status: Never Smoker    Smokeless tobacco: Never Used    Alcohol use No      Comment: Currently  admitted to inpatient rehab        OBJECTIVE:     Vital Signs (Most Recent)  Pulse: 88 (01/16/18 1336)  BP: (!) 140/71 (01/16/18 1336)    Physical Exam:  General: Black or  female in NAD sitting in chair in clinic  Neuro: aaox4 maex4 perrl  Respiratory: resps even unlabored  Cardiac: cap refill <2 sec  Abdomen: soft, wound vac to right subcostal incision  Extremities: Warm dry and intact  Anorectal Exam:    Anal Skin: Small prolapsed right posterior hemorrhoid with external component    Digital Rectal Exam:  Resting Tone low  Squeeze low  Relaxation with bear down present  Mass none   Rectocele  absent  Tenderness  present      ASSESSMENT/PLAN:     Diagnoses and all orders for this visit:    Grade II hemorrhoids    Patient with history of anal fissure in past which appears to have resolved with improvement in her bowel habits since surgery. She has internal hemorrhoids on anoscopy, no stigmata of bleeding and no history of bleeding. Overall her symptoms appear controlled. She does not currently take any laxatives or stool softeners and she states she has daily bowel movements and does not strain. For now, we will recommend a high fiber diet for conservative management of her hemorrhoids and to return if any recurrent symptoms develop.      I have personally obtained a history and performed a physical exam with and independent to my resident and discussed the findings and plan with the patient.  I agree with the above findings and plan with the following exceptions:  None    H, Romeo Zapata MD, FACS, FASCRS  Staff Surgeon  Dept of Colon and Rectal Surgery  [facility] [place], [place]    Anoscopy:  Hemorrhoids  present  Stigmata of bleeding  absent  Stigmata of prolapsed  absent  Distal rectal mucosa normal

## 2018-01-16 NOTE — PROGRESS NOTES
Transplant Surgery  Liver Transplant Recipient Follow-up    Original Referring Physician: Andrew Bradshaw  Current Corresponding Physician:     Chief Complaint: Marcie is here for follow up of her liver transplant performed 2017 for the primary diagnosis (UNOS) of Alcoholic Cirrhosis    ORGAN: LIVER  Whole or Partial: whole liver  Donor Type:  - brain death  PHS Increased Risk: no  Donor CMV Status: Negative  Donor HCV Status: Negative  Donor HBcAb: Negative  Biliary Anastomosis: end to end  Arterial Anatomy: standard  IVC reconstruction: cavaplasty piggyback  Portal vein status: patent    Subjective:     History of Present Illness: She has had the following complications since transplant: renal insufficiency and wound infection.  The noted complications are well controlled.    Interval History: Currently, she is doing well.  Current complaints include edema, fatigue and weakness.   Marcie is here for management of her immunosuppression medication.    Review of Systems   Constitutional: Negative for activity change, appetite change, chills and fever.   HENT: Negative for congestion, nosebleeds, sinus pressure, sore throat and voice change.    Eyes: Negative for pain and visual disturbance.   Respiratory: Positive for cough (better with robitussin) and shortness of breath.    Cardiovascular: Negative for palpitations and leg swelling.   Gastrointestinal: Negative for abdominal distention, abdominal pain, diarrhea, nausea and vomiting.   Endocrine: Negative for cold intolerance and heat intolerance.   Genitourinary: Negative for dysuria, flank pain, frequency and hematuria.   Musculoskeletal: Negative for back pain and gait problem.   Skin: Negative for color change, pallor and wound.   Allergic/Immunologic: Negative for food allergies.   Neurological: Positive for weakness (as well as some balance problems but is ambulating at home). Negative for dizziness, seizures, numbness and headaches.    Hematological: Negative for adenopathy.   Psychiatric/Behavioral: Negative for agitation, behavioral problems, hallucinations and sleep disturbance.       Objective:     Physical Exam   Constitutional: She is oriented to person, place, and time. She appears well-developed and well-nourished.   HENT:   Head: Normocephalic and atraumatic.   Eyes: EOM are normal. Pupils are equal, round, and reactive to light.   Cardiovascular: Normal rate and regular rhythm.    Pulmonary/Chest: Effort normal.   Abdominal: Soft. She exhibits no distension. There is no tenderness. There is no guarding.   Vac and staples in place.     Musculoskeletal: Normal range of motion. She exhibits edema.   Neurological: She is alert and oriented to person, place, and time.   Skin: Skin is warm and dry.   Psychiatric: She has a normal mood and affect. Her behavior is normal.     Lab Results   Component Value Date    BILITOT 1.7 (H) 01/16/2018    AST 16 01/16/2018    ALT 33 01/16/2018    ALKPHOS 123 01/16/2018    CREATININE 0.9 01/16/2018    ALBUMIN 2.8 (L) 01/16/2018     Lab Results   Component Value Date    WBC 4.82 01/16/2018    HGB 9.4 (L) 01/16/2018    HCT 29.1 (L) 01/16/2018    HCT 28 (L) 12/26/2017     01/16/2018     Lab Results   Component Value Date    TACROLIMUS 13.0 01/16/2018       Assessment/Plan:          · S/P liver transplant.  · Chronic immunosuppressive medications for rejection prophylaxis supratherapeutic.  Plan: hold tonight, restart at 1/1 tomorrow am. .  · Continue monitoring symptoms, labs and drug levels for drug-related toxicity and side effects.  · Incision: wound open: partial wound vac  · Femoral arterial line site: no complications evident   · Continue pt at home  · Needs etoh rehab    MD JONO Hood Jr Patient Status  Functional Status: 60% - Requires occasional assistance but is able to care for needs  Physical Capacity: Limited Mobility

## 2018-01-16 NOTE — PROGRESS NOTES
Physical Therapy Daily Note     Name: Marcie Bazan  Clinic Number: [id number]  Diagnosis:   Encounter Diagnosis   Name Primary?    Weakness      Physician: Devon Robles NP  Treatment Orders: PT Eval and Treat  Past Medical History:   Diagnosis Date    Alcohol abuse     Alcoholic hepatitis     Anemia of chronic disease     Coagulopathy     End stage liver disease     Hypertension     Hyponatremia     Liver cirrhosis     Liver transplant candidate     Obesity (BMI 30-39.9) 1/5/2016       Precautions: s/p liver transplant 12-26-17  Visit # authorized: 2/20 on 1/16/2018  Authorization period: based on medical necessity      Subjective     Pt reports: feels tired no energy  Pt states she took a pain pill prior to PT.  Pt states she has buttock and abdominal pain    Pain Scale: before treatment: 2-3 currently; after treatment: 2-3    Objective     TREATMENT  Therapeutic exercise: Marcie received therapeutic exercises to develop strength, endurance, ROM, posture and core stabilization for 45 minutes including:   Reviewed HEP   Hip flexion sitting x 15 alternate legs   LAQ x 10  Ankle circles x 10   Glut sets sitting in chair 3 sec x 10   Shoulder flexion sitting x 10   Shoulder abd sitting x 10    Standing ex added today  Heel raises x 10  March in place x 10  Hip abd standing x 10  Ambulated in clinic 100' with rolling walker with SBA 1     MH to LB in sitting in WC after treatment 15 min due to c/o LBP       Written Home Exercises Provided: indented ex noted above  Pt demo good understanding of the education provided. Marcie demonstrated good return demonstration of activities.     Pt. education:  · Posture reeducation, body mechanics, HEP,   · No spiritual or educational barriers to learning provided  · Pt has no cultural, educational or language barriers to learning provided.    Assessment     Pt with fatigue at end, but able to progress with further strengthening with HEP and standing ex.   Pt will  continue to benefit from skilled outpatient physical therapy to address the remaining functional deficits, provide pt/family education, and to maximize pt's level of independence in the home and community environment. .     Short Term GOALS: 3 weeks. Pt agrees with goals set.  1. Pt will walk 75 feet with or without AD independently without need for rest break in order to demonstrate increased independence and endurance for functional mobility around the home  2. Pt will increase strength by 1/3 MMT grade in BLE in order to perform ADLs with decreased difficulty.  3. Pt will perform 5 min of standing therex without need for rest break in order to show increased endurance to standing position required for cooking at home  4. Pt will be I with HEP.     Long Term GOALS: 8-10  weeks. Pt agrees with goals set.  1. Pt will ambulate 200 feet independently without rest break in order to show increased independence and endurance to functional mobility in home and community  2. Pt will increase strength to >/= 4/5 MMT grade in BLE in order to perform ADLs without difficulty.  3. Pt will perform 20 min of standing therex without need for rest break in order to show increased endurance to standing position required for cooking at home  3. Pt's goal:  Independent with all ADLs and mobility, be able to cook for family       Anticipated barriers to physical therapy: as per diagnosis  Pt's spiritual, cultural and educational needs considered and pt agreeable to plan of care and goals        Plan   Continue with established Plan of Care towards PT goals.

## 2018-01-16 NOTE — TELEPHONE ENCOUNTER
----- Message from Lesley Finnegan sent at 1/16/2018 12:46 PM CST -----  Contact: Pt daughter-Viola  Pt daughter calling to speak with Cassidy in regards to the pt.         Call back number: [phone]

## 2018-01-16 NOTE — PROGRESS NOTES
Met with pt and son today during post L tx clinic. Pt reports not tolerating one nutrition supplement and tolerating another.   She could not recall the name of the one she did not tolerate nor the exact name of the one she took well.   I displayed various nutrition supplement pictures on computer, pt was able to identify 2 that she says she tolerated based on the pictures.   I then showed pt and son where these supplements could be found to purchase or order online for delivery; [facility], [facility], [employer].   I wrote the 2 supplements down and pt's son took this to share with hi sister.    Encouraged pt to take in up to 3 supplements per day as appetite is not great.   Encouraged to call with any further questions.

## 2018-01-16 NOTE — PROGRESS NOTES
SW Update:    SW presented to clinic to see pt for follow up and continuity of care. Pt presented as alert and oriented sitting in w/c with wound vac. Pt was accompanied by son Duarte who presented as alert and oriented x 4. Pt reports all is well at the  Apartment. Pt reports son, daughter Viola, and friend Ross are all rotating care and staying at apartment with pt. Pt reports she is going to Outpatient PT and working with a therapist individually which is going well. Pt reports HH is coming out 3x week (MWF) to change her dressing for her wound vac. GUIDO Benjamin reports pt requesting a bsc to use over the toilet. RN will place order and SW will fax to outpatient DME. Pt reports her daughter Viola will be coming to stay at the  apartment with pt tonight to relieve son. Pt reports coping well with aid of family and staff support and denies any mental health difficulties at this time. No concerns noted. SW remains available for education, resources, and support as appropriate.

## 2018-01-18 RX ORDER — TACROLIMUS 1 MG/1
1 CAPSULE ORAL EVERY 12 HOURS
Qty: 60 CAPSULE | Refills: 11 | Status: SHIPPED | OUTPATIENT
Start: 2018-01-18 | End: 2018-01-29 | Stop reason: SDUPTHER

## 2018-01-19 ENCOUNTER — LAB VISIT (OUTPATIENT)
Dept: LAB | Facility: HOSPITAL | Age: 53
End: 2018-01-19
Attending: SURGERY
Payer: COMMERCIAL

## 2018-01-19 ENCOUNTER — TELEPHONE (OUTPATIENT)
Dept: TRANSPLANT | Facility: CLINIC | Age: 53
End: 2018-01-19

## 2018-01-19 DIAGNOSIS — Z94.4 LIVER REPLACED BY TRANSPLANT: ICD-10-CM

## 2018-01-19 LAB
ALBUMIN SERPL BCP-MCNC: 3 G/DL
ALP SERPL-CCNC: 303 U/L
ALT SERPL W/O P-5'-P-CCNC: 39 U/L
ANION GAP SERPL CALC-SCNC: 9 MMOL/L
AST SERPL-CCNC: 17 U/L
BASOPHILS # BLD AUTO: 0.02 K/UL
BASOPHILS NFR BLD: 0.5 %
BILIRUB DIRECT SERPL-MCNC: 1.1 MG/DL
BILIRUB SERPL-MCNC: 1.6 MG/DL
BUN SERPL-MCNC: 10 MG/DL
CALCIUM SERPL-MCNC: 8.4 MG/DL
CHLORIDE SERPL-SCNC: 109 MMOL/L
CO2 SERPL-SCNC: 21 MMOL/L
CREAT SERPL-MCNC: 0.8 MG/DL
DIFFERENTIAL METHOD: ABNORMAL
EOSINOPHIL # BLD AUTO: 0.1 K/UL
EOSINOPHIL NFR BLD: 2 %
ERYTHROCYTE [DISTWIDTH] IN BLOOD BY AUTOMATED COUNT: 17.2 %
EST. GFR  (AFRICAN AMERICAN): >60 ML/MIN/1.73 M^2
EST. GFR  (NON AFRICAN AMERICAN): >60 ML/MIN/1.73 M^2
GLUCOSE SERPL-MCNC: 72 MG/DL
HCT VFR BLD AUTO: 28.3 %
HGB BLD-MCNC: 9.2 G/DL
IMM GRANULOCYTES # BLD AUTO: 0.02 K/UL
IMM GRANULOCYTES NFR BLD AUTO: 0.5 %
LYMPHOCYTES # BLD AUTO: 1.7 K/UL
LYMPHOCYTES NFR BLD: 38.6 %
MCH RBC QN AUTO: 30.2 PG
MCHC RBC AUTO-ENTMCNC: 32.5 G/DL
MCV RBC AUTO: 93 FL
MONOCYTES # BLD AUTO: 0.4 K/UL
MONOCYTES NFR BLD: 8.9 %
NEUTROPHILS # BLD AUTO: 2.2 K/UL
NEUTROPHILS NFR BLD: 49.5 %
NRBC BLD-RTO: 0 /100 WBC
PLATELET # BLD AUTO: 195 K/UL
PMV BLD AUTO: 10 FL
POTASSIUM SERPL-SCNC: 3.7 MMOL/L
PROT SERPL-MCNC: 5.9 G/DL
RBC # BLD AUTO: 3.05 M/UL
SODIUM SERPL-SCNC: 139 MMOL/L
TACROLIMUS BLD-MCNC: 10.5 NG/ML
WBC # BLD AUTO: 4.4 K/UL

## 2018-01-19 PROCEDURE — 82248 BILIRUBIN DIRECT: CPT

## 2018-01-19 PROCEDURE — 85025 COMPLETE CBC W/AUTO DIFF WBC: CPT

## 2018-01-19 PROCEDURE — 80197 ASSAY OF TACROLIMUS: CPT

## 2018-01-19 PROCEDURE — 80053 COMPREHEN METABOLIC PANEL: CPT

## 2018-01-19 PROCEDURE — 36415 COLL VENOUS BLD VENIPUNCTURE: CPT

## 2018-01-19 NOTE — TELEPHONE ENCOUNTER
Dr. Delarosa reviewed your labs.  Called patient to let her know no changes and repeat labs on Monday, left voicemail

## 2018-01-19 NOTE — TELEPHONE ENCOUNTER
----- Message from Harry Delarosa MD sent at 1/19/2018  2:39 PM CST -----  Results ok. No action needed

## 2018-01-22 ENCOUNTER — TELEPHONE (OUTPATIENT)
Dept: TRANSPLANT | Facility: CLINIC | Age: 53
End: 2018-01-22

## 2018-01-22 ENCOUNTER — HOSPITAL ENCOUNTER (OUTPATIENT)
Dept: RADIOLOGY | Facility: HOSPITAL | Age: 53
Discharge: HOME | End: 2018-01-22
Attending: SURGERY
Payer: COMMERCIAL

## 2018-01-22 DIAGNOSIS — Z94.4 LIVER REPLACED BY TRANSPLANT: ICD-10-CM

## 2018-01-22 PROCEDURE — 76705 ECHO EXAM OF ABDOMEN: CPT | Mod: TC

## 2018-01-22 PROCEDURE — 93975 VASCULAR STUDY: CPT | Mod: 26,,, | Performed by: RADIOLOGY

## 2018-01-22 PROCEDURE — 93975 VASCULAR STUDY: CPT | Mod: TC

## 2018-01-22 PROCEDURE — 76705 ECHO EXAM OF ABDOMEN: CPT | Mod: 26,59,, | Performed by: RADIOLOGY

## 2018-01-22 NOTE — TELEPHONE ENCOUNTER
----- Message from Harry Delarosa MD sent at 1/22/2018  2:43 PM CST -----  Results ok. No action needed

## 2018-01-22 NOTE — TELEPHONE ENCOUNTER
Dr. Delarosa reviewed your labs.  Called patient to let her know labs were good and no changes.  Patient will be seen in clinic tomorrow.

## 2018-01-23 ENCOUNTER — CLINICAL SUPPORT (OUTPATIENT)
Dept: REHABILITATION | Facility: HOSPITAL | Age: 53
End: 2018-01-23
Attending: NURSE PRACTITIONER
Payer: COMMERCIAL

## 2018-01-23 ENCOUNTER — OFFICE VISIT (OUTPATIENT)
Dept: TRANSPLANT | Facility: CLINIC | Age: 53
End: 2018-01-23
Payer: COMMERCIAL

## 2018-01-23 ENCOUNTER — OFFICE VISIT (OUTPATIENT)
Dept: WOUND CARE | Facility: CLINIC | Age: 53
End: 2018-01-23
Payer: COMMERCIAL

## 2018-01-23 VITALS
DIASTOLIC BLOOD PRESSURE: 86 MMHG | SYSTOLIC BLOOD PRESSURE: 162 MMHG | BODY MASS INDEX: 26.17 KG/M2 | HEART RATE: 80 BPM | HEIGHT: 63 IN | OXYGEN SATURATION: 100 % | WEIGHT: 147.69 LBS | RESPIRATION RATE: 20 BRPM | TEMPERATURE: 98 F

## 2018-01-23 DIAGNOSIS — T81.89XA NON-HEALING SURGICAL WOUND, INITIAL ENCOUNTER: ICD-10-CM

## 2018-01-23 DIAGNOSIS — Z94.4 LIVER REPLACED BY TRANSPLANT: ICD-10-CM

## 2018-01-23 DIAGNOSIS — Z94.4 S/P LIVER TRANSPLANT: Primary | ICD-10-CM

## 2018-01-23 DIAGNOSIS — Z51.81 ENCOUNTER FOR THERAPEUTIC DRUG MONITORING: Primary | ICD-10-CM

## 2018-01-23 DIAGNOSIS — R53.1 WEAKNESS: ICD-10-CM

## 2018-01-23 DIAGNOSIS — Z79.60 LONG-TERM USE OF IMMUNOSUPPRESSANT MEDICATION: Chronic | ICD-10-CM

## 2018-01-23 PROCEDURE — 97605 NEG PRS WND THER DME<=50SQCM: CPT | Mod: S$GLB,,, | Performed by: CLINICAL NURSE SPECIALIST

## 2018-01-23 PROCEDURE — 20520 RMVL FB MUSC/TDN SIMPLE: CPT | Mod: PBBFAC | Performed by: CLINICAL NURSE SPECIALIST

## 2018-01-23 PROCEDURE — 97110 THERAPEUTIC EXERCISES: CPT | Performed by: PHYSICAL THERAPIST

## 2018-01-23 PROCEDURE — 99212 OFFICE O/P EST SF 10 MIN: CPT | Mod: PBBFAC,27,25 | Performed by: CLINICAL NURSE SPECIALIST

## 2018-01-23 PROCEDURE — 20520 RMVL FB MUSC/TDN SIMPLE: CPT | Mod: S$GLB,,, | Performed by: CLINICAL NURSE SPECIALIST

## 2018-01-23 PROCEDURE — 99215 OFFICE O/P EST HI 40 MIN: CPT | Mod: 24,S$GLB,, | Performed by: SURGERY

## 2018-01-23 PROCEDURE — 99203 OFFICE O/P NEW LOW 30 MIN: CPT | Mod: S$GLB,,, | Performed by: CLINICAL NURSE SPECIALIST

## 2018-01-23 PROCEDURE — 97605 NEG PRS WND THER DME<=50SQCM: CPT | Mod: PBBFAC | Performed by: CLINICAL NURSE SPECIALIST

## 2018-01-23 PROCEDURE — 99999 PR PBB SHADOW E&M-EST. PATIENT-LVL II: CPT | Mod: PBBFAC,,, | Performed by: CLINICAL NURSE SPECIALIST

## 2018-01-23 PROCEDURE — 99999 PR PBB SHADOW E&M-EST. PATIENT-LVL III: CPT | Mod: PBBFAC,,,

## 2018-01-23 PROCEDURE — 99213 OFFICE O/P EST LOW 20 MIN: CPT | Mod: PBBFAC,25

## 2018-01-23 RX ORDER — POTASSIUM CHLORIDE 1.5 G/1.58G
40 POWDER, FOR SOLUTION ORAL DAILY
Qty: 20 PACKET | Refills: 2 | Status: SHIPPED | OUTPATIENT
Start: 2018-01-23 | End: 2018-01-30 | Stop reason: SINTOL
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
## 2018-01-23 NOTE — PROGRESS NOTES
Subjective:       Patient ID: Marcie Bazan is a 52 y.o. female.    Chief Complaint: Wound Check    This is new pt sent from transplant clinic today to have her NPWT reapplied . She has been on VAC for weeks since surgery, has had some troubles with the dressing and the HHN she reports.       Wound Check       Review of Systems   Constitutional: Negative for activity change, appetite change and fever.   HENT: Negative for congestion and rhinorrhea.    Respiratory: Negative for cough and shortness of breath.    Cardiovascular: Negative for chest pain.   Gastrointestinal: Negative.    Genitourinary: Negative.    Musculoskeletal: Negative.    Skin: Positive for wound.   Neurological: Negative.    Psychiatric/Behavioral: Negative.        Objective:      Physical Exam   Constitutional: She is oriented to person, place, and time. She appears well-developed and well-nourished.   Pulmonary/Chest: Effort normal. No respiratory distress.   Abdominal: Soft. Bowel sounds are normal. She exhibits no distension.   Musculoskeletal: Normal range of motion. She exhibits no edema.   Neurological: She is alert and oriented to person, place, and time.   Skin: Skin is warm and dry.   Psychiatric: She has a normal mood and affect.           1-2 cm x 11 cm         1 x 6 cm  With black foam embedded  This wound is healing well, I did find a piece of black sponge embedded in wound and had to laboriously dig it out with pickups, then cleansed wound thoroughly and applied a new dressing in an incisional vac fashion,  I prepped skin and applied steri strips and then black foam over all to cont suction , this is to remain until next Monday     Assessment:       1. Liver transplant 12/26/2017 for ETOH    2. Long-term use of immunosuppressant medication    3. Non-healing surgical wound, initial encounter        Plan:       Hold [facility] for now and I will see pt on Monday   Removal of embedded sponge which took 15-20 min     Reapply VAC dressing   I  have reviewed the plan of care with the patient  and she express understanding. I spent over 50% of this 30 minute visit in face to face counseling.

## 2018-01-23 NOTE — PROGRESS NOTES
Transplant Surgery  Liver Transplant Recipient Follow-up    Original Referring Physician: Andrew Bradshaw  Current Corresponding Physician:     Chief Complaint: Marcie is here for follow up of her liver transplant performed 2017 for the primary diagnosis (UNOS) of Alcoholic Cirrhosis    ORGAN: LIVER  Whole or Partial: whole liver  Donor Type:  - brain death  PHS Increased Risk: no  Donor CMV Status: Negative  Donor HCV Status: Negative  Donor HBcAb: Negative  Biliary Anastomosis: end to end  Arterial Anatomy: standard  IVC reconstruction: cavaplasty piggyback  Portal vein status: patent    Subjective:     History of Present Illness: She has had the following complications since transplant: wound infection.  The noted complications need to be addressed more thoroughly today.    Interval History: Currently, she is doing well.  Current complaints include none.  Marcie is here for management of her immunosuppression medication.    Review of Systems   Constitutional: Negative.  Negative for activity change, appetite change, chills, diaphoresis, fatigue, fever and unexpected weight change.   Respiratory: Negative for cough, choking, chest tightness and shortness of breath.    Cardiovascular: Negative for chest pain.   Gastrointestinal: Negative for abdominal distention, constipation, diarrhea, nausea and vomiting.   Musculoskeletal: Negative for arthralgias.   Skin: Negative for color change and rash.   Neurological: Negative for dizziness and headaches.   Psychiatric/Behavioral: Negative for confusion.   All other systems reviewed and are negative.      Objective:     Physical Exam   Constitutional: She is oriented to person, place, and time. She appears well-developed and well-nourished. No distress.   HENT:   Mouth/Throat: No oropharyngeal exudate.   Eyes: Conjunctivae are normal. Pupils are equal, round, and reactive to light. No scleral icterus.   Neck: Neck supple. No thyroid mass and no  thyromegaly present.   Cardiovascular: Normal rate, normal heart sounds and intact distal pulses.    Pulmonary/Chest: Effort normal and breath sounds normal. No respiratory distress. She has no wheezes. She has no rales.   Abdominal: Soft. She exhibits no distension, no ascites and no mass. There is no hepatosplenomegaly. There is no tenderness. There is no rebound and no guarding. No hernia.       Musculoskeletal: She exhibits no edema.   Lymphadenopathy:        Head (right side): No submandibular adenopathy present.        Head (left side): No submandibular adenopathy present.     She has no cervical adenopathy.        Right: No inguinal and no supraclavicular adenopathy present.        Left: No inguinal and no supraclavicular adenopathy present.   Neurological: She is alert and oriented to person, place, and time.   Skin: Skin is dry and intact. No rash noted. She is not diaphoretic.   Psychiatric: She has a normal mood and affect.   Nursing note and vitals reviewed.    Lab Results   Component Value Date    BILITOT 1.4 (H) 01/22/2018    AST 11 01/22/2018    ALT 23 01/22/2018    ALKPHOS 151 (H) 01/22/2018    CREATININE 1.0 01/22/2018    ALBUMIN 3.1 (L) 01/22/2018     Lab Results   Component Value Date    WBC 4.96 01/22/2018    HGB 9.5 (L) 01/22/2018    HCT 29.1 (L) 01/22/2018    HCT 28 (L) 12/26/2017     01/22/2018     Lab Results   Component Value Date    TACROLIMUS 9.2 01/22/2018       Assessment/Plan:          · S/P liver transplant.  · Chronic immunosuppressive medications for rejection prophylaxis at target.  Plan: no adjustment needed.  · Continue monitoring symptoms, labs and drug levels for drug-related toxicity and side effects.  · Incision: wound open: partial wound vac, needs ongoing attention. Will refer to our wound care clinic   · Femoral arterial line site: no complications evident    Harry Delarosa MD       [facility] Patient Status  Functional Status: 60% - Requires occasional assistance but is  able to care for needs  Physical Capacity: No Limitations

## 2018-01-23 NOTE — PROGRESS NOTES
STAPLE REMOVAL NOTE    Staples removed from liver transplant incision, per Dr. Delarosa's order.  Patient tolerated procedure well.  Patient has appointment with Vee Wright in wound care today at 4 pm for her to assess patients skin, has wounds bilateral..

## 2018-01-23 NOTE — LETTER
January 23, 2018      Harry Delarosa MD  [address]  [facility] [address]           [facility]  [address]  [facility] [address]  Phone: [phone]          Patient: Marcie Bazan   MR Number: [id number]   YOB: 1965   Date of Visit: 1/23/2018       Dear Dr. Harry Delarosa:    Thank you for referring Marcie Bazan to me for evaluation. Attached you will find relevant portions of my assessment and plan of care.    If you have questions, please do not hesitate to call me. I look forward to following Marcie Bazan along with you.    Sincerely,    Vee Wright, CNS    Enclosure  CC:  No Recipients    If you would like to receive this communication electronically, please contact [email or web address] or [phone] to request more information on [employer] Link access.    For providers and/or their staff who would like to refer a patient to [facility], please contact us through our one-stop-shop provider referral line, [facility], at [phone].    If you feel you have received this communication in error or would no longer like to receive these types of communications, please e-mail [email or web address]

## 2018-01-24 ENCOUNTER — TELEPHONE (OUTPATIENT)
Dept: TRANSPLANT | Facility: CLINIC | Age: 53
End: 2018-01-24

## 2018-01-24 NOTE — TELEPHONE ENCOUNTER
Called [facility] to inform them that the patient saw the [facility] Nurse yesterday and wants to hold wound care until she sees the patient again on Monday.  Also call to inform patient's daughter of the change.

## 2018-01-24 NOTE — TELEPHONE ENCOUNTER
KARLA received msg from pts post liver nurse coord yesterday that pt is getting closer to being able to return home to [place], [place].   The nurse coord tatyana wants to ensure that pt is able to transition to outpatient PT in  and find HH for wound care in .       KARLA spoke with Eloina at [facility], ph# [phone],  they would be able to accept pt with ins and piggyback on existing outpatient rehab orders, since pt with [facility].   KARLA just needs to inform them when pt would be returning home to [place], [place] area to set up her first appt there.       KARLA still in process of looking for home health agency that will accept medicaid in [facility].    KARLA sent msg to pts nurse coord about psychosocial needs for pt to return home.   KARLA remains available for all transplant resources, education and psychosocial support.

## 2018-01-25 ENCOUNTER — TELEPHONE (OUTPATIENT)
Dept: TRANSPLANT | Facility: CLINIC | Age: 53
End: 2018-01-25

## 2018-01-25 NOTE — TELEPHONE ENCOUNTER
SW investigating potential available HH resources for patient in [facility], if she continues to require home health wound care.    Pt insured by [facility] medicaid, so SW able to find a home health provider list for [facility] from [facility] Medicaid website.    1. [facility] , [phone], intake nurse and her supervisor report that they are unable to take any more pts in pts area at this time.     2. [facility] , [phone], intake reports they are unable to accept anymore medicaid pts at this time    3. [facility] , [phone], intake reports they are unable to accept anymore medicaid pts at this itme    [facility]. [facility], [phone], intake reports they are no longer accepting medicaid.    5. [facility] , [phone]. No answer.      6. [facility] [address], [phone], no answer    KARLA did speak with Vee Wright, wound care nurse at [facility] about any wound care clinics at [facility] locations.  Vee feels that pts wound should heal and not require HH SN care by next week.   KARLA communicated this with pts post liver nurse coord and liver transplant SW team.         KARLA remains available for all transplant resources, education and psychosocial support.

## 2018-01-26 ENCOUNTER — TELEPHONE (OUTPATIENT)
Dept: TRANSPLANT | Facility: CLINIC | Age: 53
End: 2018-01-26

## 2018-01-26 ENCOUNTER — TELEPHONE (OUTPATIENT)
Dept: TRANSPLANT | Facility: HOSPITAL | Age: 53
End: 2018-01-26

## 2018-01-26 NOTE — TELEPHONE ENCOUNTER
----- Message from Lesley Finnegan sent at 1/26/2018  3:00 PM CST -----  Contact: Pt daughter-Viola   Calling to speak with Cassidy in regards to to the pt having questions.      Call back number: [phone]

## 2018-01-26 NOTE — TELEPHONE ENCOUNTER
Spoke to patient's daughter: asking if patient can drink water, due to has swelling of feet.  Instructed her patient may drink water, but to avoid added salt/sodium.  Avoid adding salt to foods, and avoid canned foods/ sodas if possible.  And avoid meats like ham or brennan (high in sodium).  She was also instructed to elevate feet when sitting or lying down.   Daughter able to repeat instructions and voiced understanding.

## 2018-01-26 NOTE — TELEPHONE ENCOUNTER
----- Message from Nette Schneider sent at 1/26/2018  4:27 PM CST -----  Contact: patient daughter Reginaldo  Patient daughter Reginaldo returning a call.        Please call [phone]        Thanks

## 2018-01-29 ENCOUNTER — LAB VISIT (OUTPATIENT)
Dept: LAB | Facility: HOSPITAL | Age: 53
End: 2018-01-29
Attending: SURGERY
Payer: COMMERCIAL

## 2018-01-29 ENCOUNTER — OFFICE VISIT (OUTPATIENT)
Dept: WOUND CARE | Facility: CLINIC | Age: 53
End: 2018-01-29
Payer: COMMERCIAL

## 2018-01-29 ENCOUNTER — TELEPHONE (OUTPATIENT)
Dept: TRANSPLANT | Facility: HOSPITAL | Age: 53
End: 2018-01-29

## 2018-01-29 DIAGNOSIS — T81.89XD NON-HEALING SURGICAL WOUND, SUBSEQUENT ENCOUNTER: Primary | ICD-10-CM

## 2018-01-29 DIAGNOSIS — Z94.4 LIVER REPLACED BY TRANSPLANT: ICD-10-CM

## 2018-01-29 DIAGNOSIS — Z94.4 S/P LIVER TRANSPLANT: ICD-10-CM

## 2018-01-29 LAB
ALBUMIN SERPL BCP-MCNC: 3 G/DL
ALP SERPL-CCNC: 365 U/L
ALT SERPL W/O P-5'-P-CCNC: 20 U/L
ANION GAP SERPL CALC-SCNC: 11 MMOL/L
AST SERPL-CCNC: 14 U/L
BASOPHILS # BLD AUTO: 0.02 K/UL
BASOPHILS NFR BLD: 0.5 %
BILIRUB DIRECT SERPL-MCNC: 0.9 MG/DL
BILIRUB SERPL-MCNC: 1.5 MG/DL
BUN SERPL-MCNC: 7 MG/DL
CALCIUM SERPL-MCNC: 8.2 MG/DL
CHLORIDE SERPL-SCNC: 106 MMOL/L
CO2 SERPL-SCNC: 21 MMOL/L
CREAT SERPL-MCNC: 0.7 MG/DL
DIFFERENTIAL METHOD: ABNORMAL
EOSINOPHIL # BLD AUTO: 0.1 K/UL
EOSINOPHIL NFR BLD: 1.3 %
ERYTHROCYTE [DISTWIDTH] IN BLOOD BY AUTOMATED COUNT: 16.4 %
EST. GFR  (AFRICAN AMERICAN): >60 ML/MIN/1.73 M^2
EST. GFR  (NON AFRICAN AMERICAN): >60 ML/MIN/1.73 M^2
GLUCOSE SERPL-MCNC: 69 MG/DL
HCT VFR BLD AUTO: 29.1 %
HGB BLD-MCNC: 9.5 G/DL
IMM GRANULOCYTES # BLD AUTO: 0.02 K/UL
IMM GRANULOCYTES NFR BLD AUTO: 0.5 %
LYMPHOCYTES # BLD AUTO: 0.8 K/UL
LYMPHOCYTES NFR BLD: 20.4 %
MCH RBC QN AUTO: 29.4 PG
MCHC RBC AUTO-ENTMCNC: 32.6 G/DL
MCV RBC AUTO: 90 FL
MONOCYTES # BLD AUTO: 0.5 K/UL
MONOCYTES NFR BLD: 13.2 %
NEUTROPHILS # BLD AUTO: 2.4 K/UL
NEUTROPHILS NFR BLD: 64.1 %
NRBC BLD-RTO: 0 /100 WBC
PLATELET # BLD AUTO: 164 K/UL
PMV BLD AUTO: 10.1 FL
POTASSIUM SERPL-SCNC: 3.4 MMOL/L
PROT SERPL-MCNC: 5.8 G/DL
RBC # BLD AUTO: 3.23 M/UL
SODIUM SERPL-SCNC: 138 MMOL/L
WBC # BLD AUTO: 3.78 K/UL

## 2018-01-29 PROCEDURE — 36415 COLL VENOUS BLD VENIPUNCTURE: CPT

## 2018-01-29 PROCEDURE — 82248 BILIRUBIN DIRECT: CPT

## 2018-01-29 PROCEDURE — 80053 COMPREHEN METABOLIC PANEL: CPT

## 2018-01-29 PROCEDURE — 85025 COMPLETE CBC W/AUTO DIFF WBC: CPT

## 2018-01-29 PROCEDURE — 80197 ASSAY OF TACROLIMUS: CPT

## 2018-01-29 PROCEDURE — 99214 OFFICE O/P EST MOD 30 MIN: CPT | Mod: S$GLB,,, | Performed by: CLINICAL NURSE SPECIALIST

## 2018-01-29 PROCEDURE — 99212 OFFICE O/P EST SF 10 MIN: CPT | Mod: PBBFAC | Performed by: CLINICAL NURSE SPECIALIST

## 2018-01-29 PROCEDURE — 99999 PR PBB SHADOW E&M-EST. PATIENT-LVL II: CPT | Mod: PBBFAC,,, | Performed by: CLINICAL NURSE SPECIALIST

## 2018-01-29 NOTE — LETTER
January 29, 2018      [place] Ferrer, [facility]  [address] [facility] [address]           [facility]  [address]  [facility] [address]  Phone: [phone]          Patient: Marcie Bazan   MR Number: [id number]   YOB: 1965   Date of Visit: 1/29/2018       Dear [place] Ferrer:    Thank you for referring Marcie Bazan to me for evaluation. Attached you will find relevant portions of my assessment and plan of care.    If you have questions, please do not hesitate to call me. I look forward to following Marcie Bazan along with you.    Sincerely,    Vee Wright, CNS    Enclosure  CC:  No Recipients    If you would like to receive this communication electronically, please contact [email or web address] or [phone] to request more information on [employer] Link access.    For providers and/or their staff who would like to refer a patient to [facility], please contact us through our one-stop-shop provider referral line, [facility], at [phone].    If you feel you have received this communication in error or would no longer like to receive these types of communications, please e-mail [email or web address]

## 2018-01-29 NOTE — PROGRESS NOTES
Subjective:       Patient ID: Marcie Bazan is a 52 y.o. female.    Chief Complaint: Wound Check    This is a fu for wound care with VAC to [facility] post liver transplant. She has been on VAC for weeks since surgery,  had some troubles with the dressing and the HHN she reports. I applied incisional version of VAC and today will see if wound healed.       Wound Check       Review of Systems   Constitutional: Negative for activity change, appetite change and fever.   HENT: Negative for congestion and rhinorrhea.    Respiratory: Negative for cough and shortness of breath.    Cardiovascular: Negative for chest pain.   Gastrointestinal: Negative.    Genitourinary: Negative.    Musculoskeletal: Negative.    Skin: Positive for wound.   Neurological: Negative.    Psychiatric/Behavioral: Negative.        Objective:      Physical Exam   Constitutional: She is oriented to person, place, and time. She appears well-developed and well-nourished.   Pulmonary/Chest: Effort normal. No respiratory distress.   Abdominal: Soft. Bowel sounds are normal. She exhibits no distension.   Musculoskeletal: Normal range of motion. She exhibits no edema.   Neurological: She is alert and oriented to person, place, and time.   Skin: Skin is warm and dry.   Psychiatric: She has a normal mood and affect.       1/23 1/29 TODAY HEALED             1-2 cm x 11 cm         1/23  Removed the NPWT and the  Wound is healed almost totally fused, will STOP VAC today   Placed a soft foam on the newly healed areas    Assessment:       1. Non-healing surgical wound, subsequent encounter    2. Liver transplant 12/26/2017 for ETOH        Plan:       Hold [facility]  And see If pt goes home this week   Applied foam dressing      See tomorrow for home plans and supplies  Discuss with cooordinator  I have reviewed the plan of care with the patient  and she express understanding. I spent over 50% of this 25 minute visit in face to face counseling.

## 2018-01-29 NOTE — TELEPHONE ENCOUNTER
"Patient's daughter reported trying to do refill on line for Prograf but saying "unavailable", requests refill to send to [facility]. Will send refill request to MD. She was informed, and was able to voice understanding.  "

## 2018-01-29 NOTE — TELEPHONE ENCOUNTER
----- Message from Lesley Finnegan sent at 1/29/2018 10:22 AM CST -----  Contact: Pt daughter-Viola  Pt daughter calling to speak with Cassidy montenegro regards to the pt medication tacrolimus (PROGRAF) 1 MG Cap, pt daughter states they were trying to get it filled and it says its unavailable.       Call back number: [phone]

## 2018-01-29 NOTE — TELEPHONE ENCOUNTER
KARLA in communication with wound care specialist Vee Wright this morning to discuss whether or not pt will need wound care at a local clinic in  or at home. Vee reports that wound is healing and pt will not need any HH wound care at this time as wound is healing well. Vee reports that pt will likely need outpatient PT. KARLA expressed understanding. SW remains available.    KARLA notified pt's RN coordinator Cassidy Dailey that pt may need outpatient PT and requesting MD sign orders. SW to follow up once orders in. SW remains available.

## 2018-01-30 ENCOUNTER — CLINICAL SUPPORT (OUTPATIENT)
Dept: REHABILITATION | Facility: HOSPITAL | Age: 53
End: 2018-01-30
Attending: NURSE PRACTITIONER
Payer: COMMERCIAL

## 2018-01-30 ENCOUNTER — OFFICE VISIT (OUTPATIENT)
Dept: WOUND CARE | Facility: CLINIC | Age: 53
End: 2018-01-30
Payer: COMMERCIAL

## 2018-01-30 ENCOUNTER — OFFICE VISIT (OUTPATIENT)
Dept: TRANSPLANT | Facility: CLINIC | Age: 53
End: 2018-01-30
Payer: COMMERCIAL

## 2018-01-30 ENCOUNTER — TELEPHONE (OUTPATIENT)
Dept: TRANSPLANT | Facility: HOSPITAL | Age: 53
End: 2018-01-30

## 2018-01-30 VITALS
RESPIRATION RATE: 16 BRPM | SYSTOLIC BLOOD PRESSURE: 184 MMHG | WEIGHT: 143.75 LBS | DIASTOLIC BLOOD PRESSURE: 83 MMHG | BODY MASS INDEX: 28.22 KG/M2 | HEIGHT: 60 IN | TEMPERATURE: 98 F | HEART RATE: 79 BPM | OXYGEN SATURATION: 100 %

## 2018-01-30 DIAGNOSIS — Z94.4 S/P LIVER TRANSPLANT: ICD-10-CM

## 2018-01-30 DIAGNOSIS — Z51.81 ENCOUNTER FOR THERAPEUTIC DRUG MONITORING: Primary | ICD-10-CM

## 2018-01-30 DIAGNOSIS — Z94.4 LIVER REPLACED BY TRANSPLANT: ICD-10-CM

## 2018-01-30 DIAGNOSIS — T81.89XD NON-HEALING SURGICAL WOUND, SUBSEQUENT ENCOUNTER: Primary | ICD-10-CM

## 2018-01-30 DIAGNOSIS — R53.1 WEAKNESS: ICD-10-CM

## 2018-01-30 LAB — TACROLIMUS BLD-MCNC: 5.1 NG/ML

## 2018-01-30 PROCEDURE — 97110 THERAPEUTIC EXERCISES: CPT | Performed by: PHYSICAL THERAPIST

## 2018-01-30 PROCEDURE — 99214 OFFICE O/P EST MOD 30 MIN: CPT | Mod: 24,S$GLB,, | Performed by: TRANSPLANT SURGERY

## 2018-01-30 PROCEDURE — 99999 PR PBB SHADOW E&M-EST. PATIENT-LVL I: CPT | Mod: PBBFAC,,, | Performed by: CLINICAL NURSE SPECIALIST

## 2018-01-30 PROCEDURE — 99211 OFF/OP EST MAY X REQ PHY/QHP: CPT | Mod: PBBFAC,27 | Performed by: CLINICAL NURSE SPECIALIST

## 2018-01-30 PROCEDURE — 99213 OFFICE O/P EST LOW 20 MIN: CPT | Mod: PBBFAC,27 | Performed by: TRANSPLANT SURGERY

## 2018-01-30 PROCEDURE — 99213 OFFICE O/P EST LOW 20 MIN: CPT | Mod: S$GLB,,, | Performed by: CLINICAL NURSE SPECIALIST

## 2018-01-30 PROCEDURE — 99999 PR PBB SHADOW E&M-EST. PATIENT-LVL III: CPT | Mod: PBBFAC,,, | Performed by: TRANSPLANT SURGERY

## 2018-01-30 PROCEDURE — 3008F BODY MASS INDEX DOCD: CPT | Mod: S$GLB,,, | Performed by: CLINICAL NURSE SPECIALIST

## 2018-01-30 PROCEDURE — 3008F BODY MASS INDEX DOCD: CPT | Mod: S$GLB,,, | Performed by: TRANSPLANT SURGERY

## 2018-01-30 RX ORDER — FUROSEMIDE 20 MG/1
20 TABLET ORAL EVERY 12 HOURS
Qty: 60 TABLET | Refills: 0 | Status: SHIPPED | OUTPATIENT
Start: 2018-01-30 | End: 2018-02-07 | Stop reason: SDUPTHER

## 2018-01-30 RX ORDER — TACROLIMUS 1 MG/1
1 CAPSULE ORAL EVERY 12 HOURS
Qty: 60 CAPSULE | Refills: 11 | Status: SHIPPED | OUTPATIENT
Start: 2018-01-30 | End: 2018-01-30 | Stop reason: SDUPTHER

## 2018-01-30 RX ORDER — ONDANSETRON 4 MG/1
4 TABLET, ORALLY DISINTEGRATING ORAL EVERY 8 HOURS PRN
Qty: 12 TABLET | Refills: 0 | Status: SHIPPED | OUTPATIENT
Start: 2018-01-30 | End: 2018-02-15 | Stop reason: SDUPTHER

## 2018-01-30 RX ORDER — NIFEDIPINE 10 MG/1
20 CAPSULE ORAL EVERY 8 HOURS
Qty: 180 CAPSULE | Refills: 11 | Status: ON HOLD | OUTPATIENT
Start: 2018-01-30 | End: 2018-02-15 | Stop reason: HOSPADM

## 2018-01-30 RX ORDER — TACROLIMUS 1 MG/1
2 CAPSULE ORAL EVERY 12 HOURS
Qty: 60 CAPSULE | Refills: 11 | Status: SHIPPED | OUTPATIENT
Start: 2018-01-30 | End: 2018-02-02 | Stop reason: SDUPTHER

## 2018-01-30 RX ORDER — POTASSIUM CHLORIDE 750 MG/1
20 CAPSULE, EXTENDED RELEASE ORAL 2 TIMES DAILY
Qty: 120 CAPSULE | Refills: 0 | Status: SHIPPED | OUTPATIENT
Start: 2018-01-30 | End: 2018-02-07 | Stop reason: ALTCHOICE

## 2018-01-30 NOTE — PROGRESS NOTES
Subjective:       Patient ID: Marcie Bazan is a 52 y.o. female.    Chief Complaint: Wound Check    This is a fu for wound care  to [facility] post liver transplant. She had been on VAC for weeks and this was stopped on 1/29 , she is now set up to return home and the wound is essentially healed, just pencil thin opening left.       Wound Check       Review of Systems   Constitutional: Negative for activity change, appetite change and fever.   HENT: Negative for congestion and rhinorrhea.    Respiratory: Negative for cough and shortness of breath.    Cardiovascular: Negative for chest pain.   Gastrointestinal: Negative.    Genitourinary: Negative.    Musculoskeletal: Negative.    Skin: Positive for wound.   Neurological: Negative.    Psychiatric/Behavioral: Negative.        Objective:      Physical Exam   Constitutional: She is oriented to person, place, and time. She appears well-developed and well-nourished.   Pulmonary/Chest: Effort normal. No respiratory distress.   Abdominal: Soft. Bowel sounds are normal. She exhibits no distension.   Musculoskeletal: Normal range of motion. She exhibits no edema.   Neurological: She is alert and oriented to person, place, and time.   Skin: Skin is warm and dry.   Psychiatric: She has a normal mood and affect.       1/23 1/29 TODAY HEALED             1-2 cm x 11 cm         1/23  Removed the NPWT  1/29/18   Placed a soft foam on the newly healed areas yesterday and changed today with instructions for home    Assessment:       1. Non-healing surgical wound, subsequent encounter    2. Liver transplant 12/26/2017 for ETOH        Plan:         Applied ALLEVYN Classic foam dressing to stay in place for 4-5 days and then change     No fu needed as pt doing fu in home town   I have reviewed the plan of care with the patient  and she express understanding. I spent over 50% of this 15 minute visit in face to face counseling.

## 2018-01-30 NOTE — LETTER
February 1, 2018                     [facility] - Liver Transplant  [address]  [facility] [address]  Phone: [phone]   Patient: Marcie Bazan   MR Number: [id number]   YOB: 1965   Date of Visit: 1/30/2018       Dear      Thank you for referring Marcie Bazan to me for evaluation. Attached you will find relevant portions of my assessment and plan of care.    If you have questions, please do not hesitate to call me. I look forward to following Marcie Bazan along with you.    Sincerely,    Jarrod Regan MD    Enclosure    If you would like to receive this communication electronically, please contact [email or web address] or [phone] to request [employer] access.    [employer] is a tool which provides read-only access to select patient information with whom you have a relationship. Its easy to use and provides real time access to review your patients record including encounter summaries, notes, results, and demographic information.    If you feel you have received this communication in error or would no longer like to receive these types of communications, please e-mail [email or web address]

## 2018-01-30 NOTE — PROGRESS NOTES
Transplant Surgery  Liver Transplant Recipient Follow-up    Original Referring Physician: Andrew Bradshaw  Current Corresponding Physician:     Chief Complaint: Marcie is here for follow up of her liver transplant performed 2017 for the primary diagnosis (UNOS) of Alcoholic Cirrhosis    ORGAN: LIVER  Whole or Partial: whole liver  Donor Type:  - brain death  PHS Increased Risk: no  Donor CMV Status: Negative  Donor HCV Status: Negative  Donor HBcAb: Negative  Biliary Anastomosis: end to end  Arterial Anatomy: standard  IVC reconstruction: cavaplasty piggyback  Portal vein status: patent    Subjective:     History of Present Illness: She has had the following complications since transplant: wound infection.  The noted complications is well controlled.    Interval History: Currently, she is doing adequately.  Current complaints include none.  Marcie is here for management of her immunosuppression medication.    Review of Systems   Constitutional: Negative for activity change, appetite change, chills and fever.   Respiratory: Negative for cough and shortness of breath.    Cardiovascular: Negative for chest pain and leg swelling.   Gastrointestinal: Negative for constipation, diarrhea and rectal pain.   Genitourinary: Negative for difficulty urinating and dysuria.   Musculoskeletal: Negative.    Skin: Negative for color change and rash.   Allergic/Immunologic: Positive for immunocompromised state.   Neurological: Negative for dizziness and light-headedness.   Psychiatric/Behavioral: Negative.    All other systems reviewed and are negative.      Objective:     Physical Exam   Constitutional: She is oriented to person, place, and time. No distress.   HENT:   Mouth/Throat: No oropharyngeal exudate.   Eyes: Pupils are equal, round, and reactive to light. No scleral icterus.   Neck: Neck supple. No thyromegaly present.   Cardiovascular: Normal rate, normal heart sounds and intact distal pulses.    No murmur  heard.  Pulmonary/Chest: No respiratory distress. She has no wheezes. She has no rales.   Abdominal: Soft. Bowel sounds are normal. She exhibits no distension and no mass. There is no tenderness. There is no rebound and no guarding. No hernia.       Musculoskeletal: She exhibits no edema or tenderness.   Lymphadenopathy:     She has no cervical adenopathy.   Neurological: She is alert and oriented to person, place, and time.   Skin: Skin is dry. She is not diaphoretic. No pallor.   Psychiatric: She has a normal mood and affect.   Nursing note and vitals reviewed.    Lab Results   Component Value Date    BILITOT 1.5 (H) 01/29/2018    AST 14 01/29/2018    ALT 20 01/29/2018    ALKPHOS 365 (H) 01/29/2018    CREATININE 0.7 01/29/2018    ALBUMIN 3.0 (L) 01/29/2018     Lab Results   Component Value Date    WBC 3.78 (L) 01/29/2018    HGB 9.5 (L) 01/29/2018    HCT 29.1 (L) 01/29/2018    HCT 28 (L) 12/26/2017     01/29/2018     Lab Results   Component Value Date    TACROLIMUS 5.1 01/29/2018       Assessment/Plan:          · S/P liver transplant.  · Chronic immunosuppressive medications for rejection prophylaxis subtherapeutic.  Plan: increase tacro to 2 mg BID.  · Continue monitoring symptoms, labs and drug levels for drug-related toxicity and side effects.  · Incision: wound open: needs ongoing attention. Healthy granulation tissue seen. Shall see wound care nurse after the Tx clinic appointment   · Femoral arterial line site: no complications evident   · Leg swelling thus increase lasix to 20 mg BID  · /83 - prescribed Nifedepine 10 mg BID  · Nausea with Kcl tabs, thus microK tabs 40 Meq prescribed    Yeison Vora MD       UNOS Patient Status  Functional Status: 70% - Cares for self: unable to carry on normal activity or active work  Physical Capacity: Limited Mobility       Assessed and agree with above.    Jarrod Regan MD

## 2018-01-30 NOTE — PROGRESS NOTES
Physical Therapy Daily Note     Name: Marcie Bazan  Clinic Number: [id number]  Diagnosis:   Encounter Diagnosis   Name Primary?    Weakness      Physician: Devon Robles NP  Treatment Orders: PT Eval and Treat  Past Medical History:   Diagnosis Date    Alcohol abuse     Alcoholic hepatitis     Anemia of chronic disease     Coagulopathy     End stage liver disease     Hypertension     Hyponatremia     Liver cirrhosis     Liver transplant candidate     Obesity (BMI 30-39.9) 1/5/2016       Precautions: s/p liver transplant 12-26-17  Visit # authorized: 4/20 on 1/30/2018  Authorization period: based on medical necessity      Subjective     Pt reports: continue with swelling problems taking Lasix, forgot pain pill this AM and having stomach pain and foot pain due to swelling    Pain Scale: before treatment: N/T currently; after treatment: N/T    Objective     TREATMENT  Therapeutic exercise: Marcie received therapeutic exercises to develop strength, endurance, ROM, posture and core stabilization for 55 minutes including:   Reviewed HEP   Hip flexion sitting x 20 alternate legs   LAQ x 2/10  Ankle circles x 20  Glut sets sitting in chair 3 sec x 20  Shoulder flexion sitting x 20  Shoulder abd sitting x 20    Standing ex added today  Heel raises x 10  March in place x 10  Hip abd standing x 10   Hip ext leaning on counter x 10   Balance on blue foam feet even and rhomberg x 2 and march 30 sec each  Ambulated in clinic 100' with no device, with SBA 1         Written Home Exercises Provided: whole program again, for pt reported she lost sheets  Pt demo good understanding of the education provided. Marcie demonstrated good return demonstration of activities.     Pt. education:  · Posture reeducation, body mechanics, HEP,   · No spiritual or educational barriers to learning provided  · Pt has no cultural, educational or language barriers to learning provided.    Assessment     Pt able to progress further with  strengthening and balance work, still needs work on endurance.  Pt ambulating well with no device   Pt will continue to benefit from skilled outpatient physical therapy to address the remaining functional deficits, provide pt/family education, and to maximize pt's level of independence in the home and community environment. .     Short Term GOALS: 3 weeks. Pt agrees with goals set.  1. Pt will walk 75 feet with or without AD independently without need for rest break in order to demonstrate increased independence and endurance for functional mobility around the home  2. Pt will increase strength by 1/3 MMT grade in BLE in order to perform ADLs with decreased difficulty.  3. Pt will perform 5 min of standing therex without need for rest break in order to show increased endurance to standing position required for cooking at home  4. Pt will be I with HEP.     Long Term GOALS: 8-10  weeks. Pt agrees with goals set.  1. Pt will ambulate 200 feet independently without rest break in order to show increased independence and endurance to functional mobility in home and community  2. Pt will increase strength to >/= 4/5 MMT grade in BLE in order to perform ADLs without difficulty.  3. Pt will perform 20 min of standing therex without need for rest break in order to show increased endurance to standing position required for cooking at home  3. Pt's goal:  Independent with all ADLs and mobility, be able to cook for family       Anticipated barriers to physical therapy: as per diagnosis  Pt's spiritual, cultural and educational needs considered and pt agreeable to plan of care and goals        Plan   Continue with established Plan of Care towards PT goals.

## 2018-01-30 NOTE — TELEPHONE ENCOUNTER
KARLA notified that patient able to return home to [place] at this time.   Pt will not require wound care with HH as incision wound is healed.    KARLA spoke with Eloina at [facility], ph# [phone],  they would be able to accept pt with ins and piggyback on existing outpatient rehab orders, since pt with [facility].     Eloina reports they are full this week, but could see pt as early as Monday 7:30am next week.   KARLA discussed with Eloina having pts daughter call to confirm this first appt in , to ensure they can get the patient to this appt.  KARLA provided [facility] contact information to pts ruth Ramachandran today, for pt/family to call for first appt in .   Both pt and son Duarte verbalized understanding.       Pt expressed she is ready to return home to [place] and they will be checking out of [place] today.  KARLA remains available for all transplant resources, education and psychosocial support.

## 2018-01-30 NOTE — TELEPHONE ENCOUNTER
Liver Transplant  Clinic Follow Up:  SW met with pt and pt's son Duarte in order to provide continuity of care and resources. Pt AAOx4 and sitting up in wheel chair, son Duarte accompanying pt, both pleasant and engaged. Pt and son report coping well at this time. Pt tearful when discussing the support she has received from her children since becoming sick. Pt reports motivation to complete substance abuse program [facility] (ph#[phone]) once pt is more medically stable and ambulating better. Pt currently receiving outpatient PT at [facility]. Pt aware that her care can be transferred to [facility] in [place] at the [facility] ([phone]). Liver SW Tiesha Montenegro assisting with setting this up on pt's behalf and will contact pt's daughter Segundo to confirm details. Pt aware that she will be cleared to return home to [place] tomorrow. SW to notify LR that pt will be vacating apartment before end of the week. SW provided space for pt and son to ask additional questions/voice concerns. Pt and son denied any further needs. Pt reports that she is planning to stop by Vee Wright's office in wound care to get more supplies for healing wound. Pt reports that she does not need to receive wound care at home or clinic at this time. SW remains available for continued psychosocial support, education, and resources.    SW notified LR that pt planning to vacate apartment in next few days. SW remains available.

## 2018-02-01 ENCOUNTER — TELEPHONE (OUTPATIENT)
Dept: TRANSPLANT | Facility: HOSPITAL | Age: 53
End: 2018-02-01

## 2018-02-01 ENCOUNTER — LAB VISIT (OUTPATIENT)
Dept: LAB | Facility: HOSPITAL | Age: 53
End: 2018-02-01
Attending: SURGERY
Payer: COMMERCIAL

## 2018-02-01 ENCOUNTER — TELEPHONE (OUTPATIENT)
Dept: TRANSPLANT | Facility: CLINIC | Age: 53
End: 2018-02-01

## 2018-02-01 DIAGNOSIS — Z94.4 LIVER REPLACED BY TRANSPLANT: ICD-10-CM

## 2018-02-01 LAB
ALBUMIN SERPL BCP-MCNC: 3 G/DL
ALP SERPL-CCNC: 352 U/L
ALT SERPL W/O P-5'-P-CCNC: 22 U/L
ANION GAP SERPL CALC-SCNC: 11 MMOL/L
AST SERPL-CCNC: 18 U/L
BASOPHILS # BLD AUTO: 0.04 K/UL
BASOPHILS NFR BLD: 1.1 %
BILIRUB DIRECT SERPL-MCNC: 0.9 MG/DL
BILIRUB SERPL-MCNC: 1.3 MG/DL
BUN SERPL-MCNC: 2 MG/DL
CALCIUM SERPL-MCNC: 8.2 MG/DL
CHLORIDE SERPL-SCNC: 105 MMOL/L
CO2 SERPL-SCNC: 26 MMOL/L
CREAT SERPL-MCNC: 0.7 MG/DL
DIFFERENTIAL METHOD: ABNORMAL
EOSINOPHIL # BLD AUTO: 0.1 K/UL
EOSINOPHIL NFR BLD: 1.7 %
ERYTHROCYTE [DISTWIDTH] IN BLOOD BY AUTOMATED COUNT: 16.5 %
EST. GFR  (AFRICAN AMERICAN): >60 ML/MIN/1.73 M^2
EST. GFR  (NON AFRICAN AMERICAN): >60 ML/MIN/1.73 M^2
GLUCOSE SERPL-MCNC: 76 MG/DL
HCT VFR BLD AUTO: 31.2 %
HGB BLD-MCNC: 10.1 G/DL
IMM GRANULOCYTES # BLD AUTO: 0.02 K/UL
IMM GRANULOCYTES NFR BLD AUTO: 0.6 %
LYMPHOCYTES # BLD AUTO: 0.7 K/UL
LYMPHOCYTES NFR BLD: 20.4 %
MCH RBC QN AUTO: 29.1 PG
MCHC RBC AUTO-ENTMCNC: 32.4 G/DL
MCV RBC AUTO: 90 FL
MONOCYTES # BLD AUTO: 0.5 K/UL
MONOCYTES NFR BLD: 15.3 %
NEUTROPHILS # BLD AUTO: 2.2 K/UL
NEUTROPHILS NFR BLD: 60.9 %
NRBC BLD-RTO: 0 /100 WBC
PLATELET # BLD AUTO: 75 K/UL
PMV BLD AUTO: 11.8 FL
POTASSIUM SERPL-SCNC: 3 MMOL/L
PROT SERPL-MCNC: 5.9 G/DL
RBC # BLD AUTO: 3.47 M/UL
SODIUM SERPL-SCNC: 142 MMOL/L
WBC # BLD AUTO: 3.53 K/UL

## 2018-02-01 PROCEDURE — 82248 BILIRUBIN DIRECT: CPT

## 2018-02-01 PROCEDURE — 80053 COMPREHEN METABOLIC PANEL: CPT

## 2018-02-01 PROCEDURE — 36415 COLL VENOUS BLD VENIPUNCTURE: CPT | Mod: PO

## 2018-02-01 PROCEDURE — 80197 ASSAY OF TACROLIMUS: CPT

## 2018-02-01 PROCEDURE — 85025 COMPLETE CBC W/AUTO DIFF WBC: CPT

## 2018-02-01 NOTE — TELEPHONE ENCOUNTER
SW returned pts call, she was able to get the [facility] phone number from her post nurse coord earlier today.   Pt has an appt for Wed 2/07 for therapy.  Pt with no other psychosocial concerns at this time.  SW remains available for all transplant resources, education and psychosocial  support.

## 2018-02-01 NOTE — TELEPHONE ENCOUNTER
Returned call to advised that she take her Lasix at 8 am and 2 pm so she won't be up all night.  Patient verbalized understanding.

## 2018-02-01 NOTE — TELEPHONE ENCOUNTER
----- Message from Lesley Finnegan sent at 2/1/2018 12:06 PM CST -----  Contact: Pt  Pt calling to speak with Cassidy in regards to medication.       Call back number: [phone]

## 2018-02-02 LAB — TACROLIMUS BLD-MCNC: 3.9 NG/ML

## 2018-02-02 RX ORDER — HYDROMORPHONE HYDROCHLORIDE 4 MG/1
2-4 TABLET ORAL EVERY 6 HOURS PRN
Qty: 20 TABLET | Refills: 0 | Status: ON HOLD | OUTPATIENT
Start: 2018-02-02 | End: 2018-03-16 | Stop reason: HOSPADM

## 2018-02-02 NOTE — TELEPHONE ENCOUNTER
Dr. Delarosa reviewed your labs.  Called patient to let her know to increase Prograf to 3 mg bid and repeat labs on Monday.

## 2018-02-02 NOTE — TELEPHONE ENCOUNTER
----- Message from Kerry Torres sent at 2/2/2018  9:22 AM CST -----  Contact: Pt  Refill; HYDROmorphone (DILAUDID) 4 MG tablet    Call pharmacy [phone]    Call her once it has been called in, [phone]

## 2018-02-02 NOTE — TELEPHONE ENCOUNTER
----- Message from Kerry Torres sent at 2/2/2018  2:38 PM CST -----  Contact: Daughter Viola  Returning Cassidy call,     Call

## 2018-02-05 ENCOUNTER — TELEPHONE (OUTPATIENT)
Dept: TRANSPLANT | Facility: HOSPITAL | Age: 53
End: 2018-02-05

## 2018-02-05 ENCOUNTER — TELEPHONE (OUTPATIENT)
Dept: TRANSPLANT | Facility: CLINIC | Age: 53
End: 2018-02-05

## 2018-02-05 ENCOUNTER — LAB VISIT (OUTPATIENT)
Dept: LAB | Facility: HOSPITAL | Age: 53
End: 2018-02-05
Attending: SURGERY
Payer: COMMERCIAL

## 2018-02-05 DIAGNOSIS — Z94.4 LIVER REPLACED BY TRANSPLANT: ICD-10-CM

## 2018-02-05 DIAGNOSIS — D70.2 NEUTROPENIA, DRUG-INDUCED: Primary | ICD-10-CM

## 2018-02-05 LAB
ALBUMIN SERPL BCP-MCNC: 3.2 G/DL
ALP SERPL-CCNC: 339 U/L
ALT SERPL W/O P-5'-P-CCNC: 22 U/L
ANION GAP SERPL CALC-SCNC: 10 MMOL/L
AST SERPL-CCNC: 31 U/L
BASOPHILS # BLD AUTO: ABNORMAL K/UL
BASOPHILS NFR BLD: 0 %
BILIRUB DIRECT SERPL-MCNC: 0.8 MG/DL
BILIRUB SERPL-MCNC: 1.3 MG/DL
BUN SERPL-MCNC: 5 MG/DL
CALCIUM SERPL-MCNC: 8.1 MG/DL
CHLORIDE SERPL-SCNC: 105 MMOL/L
CO2 SERPL-SCNC: 25 MMOL/L
CREAT SERPL-MCNC: 0.7 MG/DL
DIFFERENTIAL METHOD: ABNORMAL
EOSINOPHIL # BLD AUTO: ABNORMAL K/UL
EOSINOPHIL NFR BLD: 1 %
ERYTHROCYTE [DISTWIDTH] IN BLOOD BY AUTOMATED COUNT: 15.9 %
EST. GFR  (AFRICAN AMERICAN): >60 ML/MIN/1.73 M^2
EST. GFR  (NON AFRICAN AMERICAN): >60 ML/MIN/1.73 M^2
GLUCOSE SERPL-MCNC: 84 MG/DL
HCT VFR BLD AUTO: 30.1 %
HGB BLD-MCNC: 9.8 G/DL
IMM GRANULOCYTES # BLD AUTO: ABNORMAL K/UL
IMM GRANULOCYTES NFR BLD AUTO: ABNORMAL %
LYMPHOCYTES # BLD AUTO: ABNORMAL K/UL
LYMPHOCYTES NFR BLD: 32 %
MCH RBC QN AUTO: 29.1 PG
MCHC RBC AUTO-ENTMCNC: 32.6 G/DL
MCV RBC AUTO: 89 FL
MONOCYTES # BLD AUTO: ABNORMAL K/UL
MONOCYTES NFR BLD: 20 %
NEUTROPHILS # BLD AUTO: ABNORMAL K/UL
NEUTROPHILS NFR BLD: 46 %
NEUTS BAND NFR BLD MANUAL: 1 %
NRBC BLD-RTO: 0 /100 WBC
PLATELET # BLD AUTO: 93 K/UL
PMV BLD AUTO: 12.7 FL
POTASSIUM SERPL-SCNC: 4.1 MMOL/L
PROT SERPL-MCNC: 6.3 G/DL
RBC # BLD AUTO: 3.37 M/UL
SODIUM SERPL-SCNC: 140 MMOL/L
WBC # BLD AUTO: 1.87 K/UL

## 2018-02-05 PROCEDURE — 85007 BL SMEAR W/DIFF WBC COUNT: CPT

## 2018-02-05 PROCEDURE — 80197 ASSAY OF TACROLIMUS: CPT

## 2018-02-05 PROCEDURE — 36415 COLL VENOUS BLD VENIPUNCTURE: CPT | Mod: PO

## 2018-02-05 PROCEDURE — 80053 COMPREHEN METABOLIC PANEL: CPT

## 2018-02-05 PROCEDURE — 85027 COMPLETE CBC AUTOMATED: CPT

## 2018-02-05 PROCEDURE — 82248 BILIRUBIN DIRECT: CPT

## 2018-02-05 RX ORDER — TACROLIMUS 1 MG/1
3 CAPSULE ORAL EVERY 12 HOURS
Qty: 180 CAPSULE | Refills: 11 | Status: SHIPPED | OUTPATIENT
Start: 2018-02-05 | End: 2018-02-06 | Stop reason: SDUPTHER

## 2018-02-05 NOTE — TELEPHONE ENCOUNTER
KARLA received a message that the patient did not turn in the keys for LR apts once she vacated the apartments.  KARLA contact the patient at ph# [phone] regarding whether or she has turned in the keys for the apartment.  The patient reported that she has not moved out the apt completely and will present today to move the remanding items left in the apt and turn in the harris.  [facility]  KARLA remains available.

## 2018-02-05 NOTE — TELEPHONE ENCOUNTER
Received a critical WBC for patient called patient to have her stop Cellcept and Valcyte.  She will need to repeat labs tomorrow.  Patient asked me to speak to her daughter because she was out of the house.  I called patient's daughter and gave her the instructions, she repeated and verbalized instructions.

## 2018-02-06 ENCOUNTER — LAB VISIT (OUTPATIENT)
Dept: LAB | Facility: HOSPITAL | Age: 53
End: 2018-02-06
Attending: SURGERY
Payer: COMMERCIAL

## 2018-02-06 DIAGNOSIS — D70.2 NEUTROPENIA, DRUG-INDUCED: ICD-10-CM

## 2018-02-06 DIAGNOSIS — Z94.4 LIVER REPLACED BY TRANSPLANT: ICD-10-CM

## 2018-02-06 LAB
ALBUMIN SERPL BCP-MCNC: 3.5 G/DL
ALP SERPL-CCNC: 361 U/L
ALT SERPL W/O P-5'-P-CCNC: 20 U/L
ANION GAP SERPL CALC-SCNC: 13 MMOL/L
ANISOCYTOSIS BLD QL SMEAR: SLIGHT
AST SERPL-CCNC: 22 U/L
BASOPHILS # BLD AUTO: 0.03 K/UL
BASOPHILS NFR BLD: 1.5 %
BILIRUB SERPL-MCNC: 1.2 MG/DL
BUN SERPL-MCNC: 4 MG/DL
CALCIUM SERPL-MCNC: 8.3 MG/DL
CHLORIDE SERPL-SCNC: 103 MMOL/L
CO2 SERPL-SCNC: 25 MMOL/L
CREAT SERPL-MCNC: 0.8 MG/DL
DACRYOCYTES BLD QL SMEAR: ABNORMAL
DIFFERENTIAL METHOD: ABNORMAL
EOSINOPHIL # BLD AUTO: 0.1 K/UL
EOSINOPHIL NFR BLD: 4.5 %
ERYTHROCYTE [DISTWIDTH] IN BLOOD BY AUTOMATED COUNT: 16 %
EST. GFR  (AFRICAN AMERICAN): >60 ML/MIN/1.73 M^2
EST. GFR  (NON AFRICAN AMERICAN): >60 ML/MIN/1.73 M^2
GLUCOSE SERPL-MCNC: 86 MG/DL
HCT VFR BLD AUTO: 34.3 %
HGB BLD-MCNC: 11.1 G/DL
LYMPHOCYTES # BLD AUTO: 0.7 K/UL
LYMPHOCYTES NFR BLD: 33.8 %
MCH RBC QN AUTO: 28.5 PG
MCHC RBC AUTO-ENTMCNC: 32.4 G/DL
MCV RBC AUTO: 88 FL
MONOCYTES # BLD AUTO: 0.3 K/UL
MONOCYTES NFR BLD: 13.9 %
NEUTROPHILS # BLD AUTO: 0.9 K/UL
NEUTROPHILS NFR BLD: 46.3 %
PLATELET # BLD AUTO: 153 K/UL
PLATELET BLD QL SMEAR: ABNORMAL
PMV BLD AUTO: 10 FL
POIKILOCYTOSIS BLD QL SMEAR: SLIGHT
POTASSIUM SERPL-SCNC: 3.8 MMOL/L
PROT SERPL-MCNC: 6.6 G/DL
RBC # BLD AUTO: 3.89 M/UL
SODIUM SERPL-SCNC: 141 MMOL/L
TACROLIMUS BLD-MCNC: 5.6 NG/ML
WBC # BLD AUTO: 2.01 K/UL

## 2018-02-06 PROCEDURE — 85025 COMPLETE CBC W/AUTO DIFF WBC: CPT | Mod: PO

## 2018-02-06 PROCEDURE — 36415 COLL VENOUS BLD VENIPUNCTURE: CPT | Mod: PO

## 2018-02-06 PROCEDURE — 80053 COMPREHEN METABOLIC PANEL: CPT | Mod: PO

## 2018-02-06 PROCEDURE — 80197 ASSAY OF TACROLIMUS: CPT

## 2018-02-06 NOTE — TELEPHONE ENCOUNTER
Reviewed Prograf level from Monday with Dr Lawton, he wants to increase Prograf to 4 mg bid and repeat labs on Thursday.  I will check to see if patient can get Neupogen sent to her home.

## 2018-02-07 ENCOUNTER — OFFICE VISIT (OUTPATIENT)
Dept: URGENT CARE | Facility: CLINIC | Age: 53
End: 2018-02-07
Payer: COMMERCIAL

## 2018-02-07 ENCOUNTER — OFFICE VISIT (OUTPATIENT)
Dept: GASTROENTEROLOGY | Facility: CLINIC | Age: 53
End: 2018-02-07
Payer: COMMERCIAL

## 2018-02-07 VITALS
HEART RATE: 84 BPM | DIASTOLIC BLOOD PRESSURE: 80 MMHG | HEIGHT: 63 IN | SYSTOLIC BLOOD PRESSURE: 124 MMHG | HEIGHT: 60 IN | WEIGHT: 127.88 LBS | BODY MASS INDEX: 25.11 KG/M2 | BODY MASS INDEX: 22.5 KG/M2 | TEMPERATURE: 98 F | DIASTOLIC BLOOD PRESSURE: 80 MMHG | SYSTOLIC BLOOD PRESSURE: 124 MMHG | OXYGEN SATURATION: 100 % | TEMPERATURE: 98 F | HEART RATE: 98 BPM | WEIGHT: 127 LBS

## 2018-02-07 DIAGNOSIS — R60.0 BILATERAL LEG EDEMA: ICD-10-CM

## 2018-02-07 DIAGNOSIS — R53.83 FATIGUE, UNSPECIFIED TYPE: Primary | ICD-10-CM

## 2018-02-07 DIAGNOSIS — Z94.4 LIVER REPLACED BY TRANSPLANT: Primary | ICD-10-CM

## 2018-02-07 DIAGNOSIS — Z79.60 LONG-TERM USE OF IMMUNOSUPPRESSANT MEDICATION: Chronic | ICD-10-CM

## 2018-02-07 DIAGNOSIS — F10.11 ALCOHOL ABUSE, IN REMISSION: ICD-10-CM

## 2018-02-07 DIAGNOSIS — G62.9 NEUROPATHY: ICD-10-CM

## 2018-02-07 DIAGNOSIS — R11.0 NAUSEA: ICD-10-CM

## 2018-02-07 DIAGNOSIS — R53.81 MALAISE: ICD-10-CM

## 2018-02-07 DIAGNOSIS — D72.810 LYMPHOCYTOPENIA: Primary | ICD-10-CM

## 2018-02-07 DIAGNOSIS — Z20.828 EXPOSURE TO THE FLU: ICD-10-CM

## 2018-02-07 DIAGNOSIS — Z94.4 S/P LIVER TRANSPLANT: ICD-10-CM

## 2018-02-07 DIAGNOSIS — R53.81 PHYSICAL DECONDITIONING: ICD-10-CM

## 2018-02-07 PROBLEM — N39.0 ENTEROCOCCUS UTI: Status: RESOLVED | Noted: 2017-12-30 | Resolved: 2018-02-07

## 2018-02-07 PROBLEM — B95.2 ENTEROCOCCUS UTI: Status: RESOLVED | Noted: 2017-12-30 | Resolved: 2018-02-07

## 2018-02-07 PROBLEM — N17.9 AKI (ACUTE KIDNEY INJURY): Status: RESOLVED | Noted: 2018-01-02 | Resolved: 2018-02-07

## 2018-02-07 PROBLEM — E87.6 HYPOKALEMIA: Status: RESOLVED | Noted: 2017-10-19 | Resolved: 2018-02-07

## 2018-02-07 LAB
CTP QC/QA: YES
FLUAV AG NPH QL: NEGATIVE
FLUBV AG NPH QL: NEGATIVE
TACROLIMUS BLD-MCNC: 4.1 NG/ML

## 2018-02-07 PROCEDURE — 3008F BODY MASS INDEX DOCD: CPT | Mod: S$GLB,,, | Performed by: PHYSICIAN ASSISTANT

## 2018-02-07 PROCEDURE — 87804 INFLUENZA ASSAY W/OPTIC: CPT | Mod: 59,PBBFAC,PO | Performed by: PHYSICIAN ASSISTANT

## 2018-02-07 PROCEDURE — 99999 PR PBB SHADOW E&M-EST. PATIENT-LVL III: CPT | Mod: PBBFAC,,, | Performed by: INTERNAL MEDICINE

## 2018-02-07 PROCEDURE — 99215 OFFICE O/P EST HI 40 MIN: CPT | Mod: S$GLB,,, | Performed by: INTERNAL MEDICINE

## 2018-02-07 PROCEDURE — 99213 OFFICE O/P EST LOW 20 MIN: CPT | Mod: PBBFAC,27,PO | Performed by: PHYSICIAN ASSISTANT

## 2018-02-07 PROCEDURE — 99213 OFFICE O/P EST LOW 20 MIN: CPT | Mod: PBBFAC,PO | Performed by: INTERNAL MEDICINE

## 2018-02-07 PROCEDURE — 99214 OFFICE O/P EST MOD 30 MIN: CPT | Mod: S$GLB,,, | Performed by: PHYSICIAN ASSISTANT

## 2018-02-07 PROCEDURE — 99999 PR PBB SHADOW E&M-EST. PATIENT-LVL III: CPT | Mod: PBBFAC,,, | Performed by: PHYSICIAN ASSISTANT

## 2018-02-07 PROCEDURE — 3008F BODY MASS INDEX DOCD: CPT | Mod: S$GLB,,, | Performed by: INTERNAL MEDICINE

## 2018-02-07 RX ORDER — TACROLIMUS 1 MG/1
4 CAPSULE ORAL EVERY 12 HOURS
Qty: 240 CAPSULE | Refills: 11 | Status: ON HOLD | OUTPATIENT
Start: 2018-02-07 | End: 2018-02-15

## 2018-02-07 RX ORDER — FUROSEMIDE 20 MG/1
20 TABLET ORAL DAILY
Qty: 30 TABLET | Refills: 0 | Status: ON HOLD
Start: 2018-02-07 | End: 2018-02-15 | Stop reason: HOSPADM

## 2018-02-07 RX ORDER — OSELTAMIVIR PHOSPHATE 45 MG/1
CAPSULE ORAL
Qty: 10 CAPSULE | Refills: 0 | Status: ON HOLD | OUTPATIENT
Start: 2018-02-07 | End: 2018-02-15 | Stop reason: HOSPADM

## 2018-02-07 NOTE — PROGRESS NOTES
"Subjective:      Patient ID: Marcie Bazan is a 52 y.o. female.    Chief Complaint: Fatigue    Patient was sent to Urgent Care from Dr. Souza (patient's transplant doctor) d/t symptoms, want to rule out flu as patient had liver transplant in Dec    Patient does not recall whether she received flu shot this year    Patient has been in and out of doctor's offices      Fatigue   This is a new problem. The current episode started yesterday (last night around midnight). Associated symptoms include abdominal pain (ongoing), chills, congestion, coughing (productive, not new for patient), diaphoresis, fatigue, a sore throat (resolved) and weakness. Pertinent negatives include no fever, headaches, nausea, rash or vomiting. She has tried nothing for the symptoms.     Review of Systems   Constitutional: Positive for chills, diaphoresis and fatigue. Negative for fever.   HENT: Positive for congestion, rhinorrhea and sore throat (resolved). Negative for ear pain.    Respiratory: Positive for cough (productive, not new for patient). Negative for shortness of breath and wheezing.    Gastrointestinal: Positive for abdominal pain (ongoing). Negative for diarrhea, nausea and vomiting.   Musculoskeletal:        (+) body aches   Skin: Negative for rash.   Neurological: Positive for weakness. Negative for dizziness, light-headedness and headaches.       Objective:   /80   Pulse 98   Temp 98.2 °F (36.8 °C) (Tympanic)   Ht 5' 3" (1.6 m)   Wt 57.6 kg (127 lb)   LMP 01/01/1985 (Approximate) Comment: 1985  SpO2 100%   BMI 22.50 kg/m²   Physical Exam   Constitutional: She appears well-developed and well-nourished. She does not appear ill. No distress.   HENT:   Head: Normocephalic and atraumatic.   Right Ear: Tympanic membrane and ear canal normal. Tympanic membrane is not erythematous. No middle ear effusion.   Left Ear: Tympanic membrane and ear canal normal. Tympanic membrane is not erythematous.  No middle ear effusion. "   Nose: Nose normal. Right sinus exhibits no maxillary sinus tenderness and no frontal sinus tenderness. Left sinus exhibits no maxillary sinus tenderness and no frontal sinus tenderness.   Mouth/Throat: Uvula is midline and oropharynx is clear and moist.   Cardiovascular: Normal rate, regular rhythm and normal heart sounds.    No murmur heard.  Pulmonary/Chest: Effort normal and breath sounds normal. No respiratory distress. She has no decreased breath sounds. She has no wheezes. She has no rhonchi. She has no rales.   Lymphadenopathy:     She has no cervical adenopathy.   Skin: Skin is warm and dry. No rash noted. She is not diaphoretic.   Psychiatric: She has a normal mood and affect. Her speech is normal and behavior is normal. Thought content normal.     Assessment:      1. Fatigue, unspecified type    2. Exposure to the flu       Plan:   Fatigue, unspecified type  -     POCT Influenza A/B    Exposure to the flu  -     oseltamivir (TAMIFLU) 45 MG capsule; Take 1 tablet once daily for 10days.  Dispense: 10 capsule; Refill: 0    Will treat w prophylactic dose d/t exposure and flu-like symptoms (without fever)  If patient develops fever she is to begin taking 1 tablet twice daily until medication is completed     Gave handout on influenza.  Printed AVS and reviewed treatment plan in detail.    Discussed worsening signs/symptoms and when to return to clinic or go to ED.   Patient expresses understanding and agrees with treatment plan.

## 2018-02-07 NOTE — PROGRESS NOTES
Transplant Hepatology  Liver Transplant Recipient Follow-up    Transplant Date: 2017  UNOS Native Liver Dx: Alcoholic Cirrhosis    Marcie is here for follow up of her liver transplant.    ORGAN: LIVER  Whole or Partial: whole liver  Donor Type:  - brain death  CDC High Risk: no  Donor CMV Status: Negative  Donor HCV Status: Negative  Donor HBcAb: Negative  Biliary Anastomosis: end to end  Arterial Anatomy: standard  IVC reconstruction: cavaplasty piggyback  Portal vein status: patent  .    Subjective:     Interval History:  Currently, she is doing with difficulty. Current complaints include malaise.  She reports she had been doing well until yesterday.  Her Prograf was increased to 4 mg twice a day and then around midnight last night she woke up just feeling weak and shaky.  She reports increased shakiness today.  She also has some increasing nausea.  Seems his nausea has been a problem since transplant but more related to recent addition of potassium.  Although it has worsened recently.  She also reports a recent grandchild may have been sick.  She denies any fevers at home.  She is afebrile here today.  She denies any other associated symptoms of vomiting, chills, diarrhea.    Aside from the issue in the last 12 hours she otherwise had been doing well since her transplant.  Her main issues have been looked immediate edema for which she was put on Lasix but has significantly improved.  She was also put on the potassium because of hypokalemia related to the Lasix.  She recent issues with leukopenia for which her CellCept and Valcyte were stopped.    Other issue since transplant has been related to early satiety.  She does have an appetite but reports having difficulty eating although she denies any overt dysphasia.    Review of Systems   Constitutional: Positive for activity change (decreased) and fatigue.   HENT: Positive for congestion (chronic issue).    Eyes: Negative.    Respiratory: Positive for  cough (occasional, chronic issue).    Cardiovascular: Negative.    Gastrointestinal: Negative.    Genitourinary: Negative.    Musculoskeletal: Positive for arthralgias and myalgias.        Sensitivity over feet and on areas of leg   Skin: Negative.    Neurological: Positive for tremors and weakness.   Psychiatric/Behavioral: Positive for sleep disturbance.       Objective:     Physical Exam   Constitutional: She is oriented to person, place, and time. No distress.   Appears like she does not feel well but no acute distress   HENT:   Head: Normocephalic and atraumatic.   Mouth/Throat: Oropharynx is clear and moist. No oropharyngeal exudate.   Eyes: Conjunctivae are normal. Pupils are equal, round, and reactive to light. Right eye exhibits no discharge. Left eye exhibits no discharge. No scleral icterus.   Pulmonary/Chest: Effort normal and breath sounds normal. No respiratory distress. She has no wheezes.   Abdominal: Soft. She exhibits no distension. There is no tenderness.   Incision appears C/D/I healed   Musculoskeletal: She exhibits edema (trace bilateral lower extremity edema).   Neurological: She is alert and oriented to person, place, and time.   Slight tremulousness   Psychiatric: She has a normal mood and affect. Her behavior is normal.   Vitals reviewed.    US 1/2018  Status post liver transplant with upper limit of normal hepatic arterial resistive indices. Otherwise satisfactory Doppler examination.    Two small peritransplant fluid collections.      Explant 12/2017  -Negative for malignancy  -Advanced stage chronic liver disease  -Fibrosis staging: Cirrhosis (stage 4 out of 4)  -Etiology: Steatohepatitis (clinical alcohol)  -Other findings:  -Moderate residual mixed steatosis  -Cytoplasmic hyaline (Courtney)  -Severe cholestasis  -Foci of extensive scarring  -Active steatohepatitis and lobular neutrophils in focal areas    WBC   Date Value Ref Range Status   02/06/2018 2.01 (L) 3.90 - 12.70 K/uL Final      Hemoglobin   Date Value Ref Range Status   02/06/2018 11.1 (L) 12.0 - 16.0 g/dL Final     POC Hematocrit   Date Value Ref Range Status   12/26/2017 28 (L) 36 - 54 %PCV Final     Hematocrit   Date Value Ref Range Status   02/06/2018 34.3 (L) 37.0 - 48.5 % Final     Platelets   Date Value Ref Range Status   02/06/2018 153 150 - 350 K/uL Final     BUN, Bld   Date Value Ref Range Status   02/06/2018 4 (L) 6 - 20 mg/dL Final     Creatinine   Date Value Ref Range Status   02/06/2018 0.8 0.5 - 1.4 mg/dL Final     Glucose   Date Value Ref Range Status   02/06/2018 86 70 - 110 mg/dL Final     Calcium   Date Value Ref Range Status   02/06/2018 8.3 (L) 8.7 - 10.5 mg/dL Final     Sodium   Date Value Ref Range Status   02/06/2018 141 136 - 145 mmol/L Final     Potassium   Date Value Ref Range Status   02/06/2018 3.8 3.5 - 5.1 mmol/L Final     Chloride   Date Value Ref Range Status   02/06/2018 103 95 - 110 mmol/L Final     Magnesium   Date Value Ref Range Status   01/05/2018 1.6 1.6 - 2.6 mg/dL Final     AST   Date Value Ref Range Status   02/06/2018 22 10 - 40 U/L Final     ALT   Date Value Ref Range Status   02/06/2018 20 10 - 44 U/L Final     Alkaline Phosphatase   Date Value Ref Range Status   02/06/2018 361 (H) 55 - 135 U/L Final     Total Bilirubin   Date Value Ref Range Status   02/06/2018 1.2 (H) 0.1 - 1.0 mg/dL Final     Comment:     For infants and newborns, interpretation of results should be based  on gestational age, weight and in agreement with clinical  observations.  Premature Infant recommended reference ranges:  Up to 24 hours.............<8.0 mg/dL  Up to 48 hours............<12.0 mg/dL  3-5 days..................<15.0 mg/dL  6-29 days.................<15.0 mg/dL       Albumin   Date Value Ref Range Status   02/06/2018 3.5 3.5 - 5.2 g/dL Final     INR   Date Value Ref Range Status   01/05/2018 0.9 0.8 - 1.2 Final     Comment:     Coumadin Therapy:  2.0 - 3.0 for INR for all indicators except mechanical  heart valves  and antiphospholipid syndromes which should use 2.5 - 3.5.       Lab Results   Component Value Date    TACROLIMUS 4.1 (L) 02/06/2018           Assessment/Plan:     1. Lymphocytopenia    2. Physical deconditioning    3. Alcohol abuse, in remission    4. Long-term use of immunosuppressant medication    5. Liver transplant 12/26/2017 for ETOH    6. Neuropathy    7. Bilateral leg edema    8. Nausea    9. Malaise      Lymphocytopenia-improving since stopping CellCept and Valcyte  -Continue to hold these medications    Physical deconditioning-improving with physical therapy  -Continue physical therapy    Alcohol abuse, in remission  -We'll address at next visit given more acute issues at this visit.  Will need to discuss plan for outpatient program and maintaining sobriety    Long-term use of immunosuppressant medication-tacrolimus dose recently increased and this could be contributing to some of her symptoms  -Plan for repeat labs on Friday to get accurate representation of medication change    Liver transplant 12/26/2017 for ETOH-good allograft function    Neuropathy-may be related to tacrolimus or history of alcohol use  -We'll monitor for now; could consider topical medication or Lyrica/gabapentin/cymbalta    Bilateral leg edema-improved  -Decrease Lasix to once daily in attempt to try to discontinue potassium as this may be worsening her symptoms  -     furosemide (LASIX) 20 MG tablet; Take 1 tablet (20 mg total) by mouth once daily.  Dispense: 30 tablet; Refill: 0    Nausea-seems to be associated with potassium  -Discontinue potassium    Malaise-unclear etiology.  Labs from yesterday are overall unremarkable.  It is possible that patient is developing the flu or some other illness has been commonly going around during this time of the year  -Will send patient to urgent care for evaluation of the flu as testing is unavailable in this clinic  -Advise patient to go home and rest and remain hydrated;  advised should she get worse or have any concerns to go to emergency department for further evaluation    Return to clinic in 2 weeks    Patient was seen in the liver transplant department at [facility].      Joselin Souza MD         [facility] Patient Status  Functional Status: 50% - Requires considerable assistance and frequent medical care  Physical Capacity: Limited Mobility

## 2018-02-08 LAB — CMV DNA SERPL NAA+PROBE-ACNC: NORMAL IU/ML

## 2018-02-09 ENCOUNTER — TELEPHONE (OUTPATIENT)
Dept: GASTROENTEROLOGY | Facility: CLINIC | Age: 53
End: 2018-02-09

## 2018-02-09 ENCOUNTER — LAB VISIT (OUTPATIENT)
Dept: LAB | Facility: HOSPITAL | Age: 53
End: 2018-02-09
Attending: INTERNAL MEDICINE
Payer: COMMERCIAL

## 2018-02-09 DIAGNOSIS — Z94.4 LIVER REPLACED BY TRANSPLANT: ICD-10-CM

## 2018-02-09 LAB
ALBUMIN SERPL BCP-MCNC: 3.3 G/DL
ALP SERPL-CCNC: 283 U/L
ALT SERPL W/O P-5'-P-CCNC: 15 U/L
ANION GAP SERPL CALC-SCNC: 10 MMOL/L
AST SERPL-CCNC: 15 U/L
BASOPHILS # BLD AUTO: 0.03 K/UL
BASOPHILS NFR BLD: 1.5 %
BILIRUB DIRECT SERPL-MCNC: 0.8 MG/DL
BILIRUB SERPL-MCNC: 1.5 MG/DL
BUN SERPL-MCNC: 6 MG/DL
CALCIUM SERPL-MCNC: 8.3 MG/DL
CHLORIDE SERPL-SCNC: 102 MMOL/L
CO2 SERPL-SCNC: 26 MMOL/L
CREAT SERPL-MCNC: 0.9 MG/DL
DIFFERENTIAL METHOD: ABNORMAL
EOSINOPHIL # BLD AUTO: 0.1 K/UL
EOSINOPHIL NFR BLD: 6.2 %
ERYTHROCYTE [DISTWIDTH] IN BLOOD BY AUTOMATED COUNT: 15.8 %
EST. GFR  (AFRICAN AMERICAN): >60 ML/MIN/1.73 M^2
EST. GFR  (NON AFRICAN AMERICAN): >60 ML/MIN/1.73 M^2
GLUCOSE SERPL-MCNC: 84 MG/DL
HCT VFR BLD AUTO: 34.5 %
HGB BLD-MCNC: 11.2 G/DL
LYMPHOCYTES # BLD AUTO: 0.9 K/UL
LYMPHOCYTES NFR BLD: 45.6 %
MCH RBC QN AUTO: 28.5 PG
MCHC RBC AUTO-ENTMCNC: 32.5 G/DL
MCV RBC AUTO: 88 FL
MONOCYTES # BLD AUTO: 0.4 K/UL
MONOCYTES NFR BLD: 19.5 %
NEUTROPHILS # BLD AUTO: 0.5 K/UL
NEUTROPHILS NFR BLD: 27.2 %
PLATELET # BLD AUTO: 169 K/UL
PMV BLD AUTO: 10.6 FL
POTASSIUM SERPL-SCNC: 3.8 MMOL/L
PROT SERPL-MCNC: 6.7 G/DL
RBC # BLD AUTO: 3.93 M/UL
SODIUM SERPL-SCNC: 138 MMOL/L
WBC # BLD AUTO: 1.95 K/UL

## 2018-02-09 PROCEDURE — 80197 ASSAY OF TACROLIMUS: CPT

## 2018-02-09 PROCEDURE — 85025 COMPLETE CBC W/AUTO DIFF WBC: CPT | Mod: PO

## 2018-02-09 PROCEDURE — 80053 COMPREHEN METABOLIC PANEL: CPT

## 2018-02-09 PROCEDURE — 36415 COLL VENOUS BLD VENIPUNCTURE: CPT | Mod: PO

## 2018-02-09 PROCEDURE — 82248 BILIRUBIN DIRECT: CPT

## 2018-02-09 NOTE — TELEPHONE ENCOUNTER
Called patient to see how she was feeling, since she was not feeling well the other day when she was in clinic with Dr Souza.    Patient took temp 99.1, cold.  Called patient to let her now if she has a fever she needs to go to the ER.  Will need labs on Monday morning.  Patient verbalized understanding of plan.

## 2018-02-10 LAB — TACROLIMUS BLD-MCNC: 6.4 NG/ML

## 2018-02-12 ENCOUNTER — TELEPHONE (OUTPATIENT)
Dept: PHARMACY | Facility: CLINIC | Age: 53
End: 2018-02-12

## 2018-02-12 ENCOUNTER — LAB VISIT (OUTPATIENT)
Dept: LAB | Facility: HOSPITAL | Age: 53
End: 2018-02-12
Attending: INTERNAL MEDICINE
Payer: COMMERCIAL

## 2018-02-12 ENCOUNTER — HOSPITAL ENCOUNTER (INPATIENT)
Facility: HOSPITAL | Age: 53
LOS: 1 days | Discharge: HOME | DRG: 809 | End: 2018-02-15
Attending: EMERGENCY MEDICINE | Admitting: TRANSPLANT SURGERY
Payer: COMMERCIAL

## 2018-02-12 ENCOUNTER — TELEPHONE (OUTPATIENT)
Dept: TRANSPLANT | Facility: CLINIC | Age: 53
End: 2018-02-12

## 2018-02-12 DIAGNOSIS — R50.81 NEUTROPENIC FEVER: Primary | ICD-10-CM

## 2018-02-12 DIAGNOSIS — Z79.60 LONG-TERM USE OF IMMUNOSUPPRESSANT MEDICATION: ICD-10-CM

## 2018-02-12 DIAGNOSIS — E83.42 HYPOMAGNESEMIA: ICD-10-CM

## 2018-02-12 DIAGNOSIS — E87.6 HYPOKALEMIA: ICD-10-CM

## 2018-02-12 DIAGNOSIS — Z94.4 S/P LIVER TRANSPLANT: ICD-10-CM

## 2018-02-12 DIAGNOSIS — Z94.4 LIVER REPLACED BY TRANSPLANT: ICD-10-CM

## 2018-02-12 DIAGNOSIS — Z91.89 AT RISK FOR OPPORTUNISTIC INFECTIONS: ICD-10-CM

## 2018-02-12 DIAGNOSIS — D63.8 ANEMIA OF CHRONIC DISEASE: ICD-10-CM

## 2018-02-12 DIAGNOSIS — Z29.89 PROPHYLACTIC IMMUNOTHERAPY: ICD-10-CM

## 2018-02-12 DIAGNOSIS — D70.9 NEUTROPENIA, UNSPECIFIED TYPE: ICD-10-CM

## 2018-02-12 DIAGNOSIS — D72.819 LEUKOPENIA: ICD-10-CM

## 2018-02-12 DIAGNOSIS — D70.9 NEUTROPENIC FEVER: Primary | ICD-10-CM

## 2018-02-12 PROBLEM — D70.2 NEUTROPENIA, DRUG-INDUCED: Status: ACTIVE | Noted: 2018-02-12

## 2018-02-12 LAB
ALBUMIN SERPL BCP-MCNC: 2.9 G/DL
ALBUMIN SERPL BCP-MCNC: 3.4 G/DL
ALP SERPL-CCNC: 456 U/L
ALP SERPL-CCNC: 458 U/L
ALT SERPL W/O P-5'-P-CCNC: 22 U/L
ALT SERPL W/O P-5'-P-CCNC: 24 U/L
ANION GAP SERPL CALC-SCNC: 13 MMOL/L
ANION GAP SERPL CALC-SCNC: 13 MMOL/L
APTT BLDCRRT: 29.7 SEC
AST SERPL-CCNC: 25 U/L
AST SERPL-CCNC: 37 U/L
BASOPHILS # BLD AUTO: 0.05 K/UL
BASOPHILS # BLD AUTO: 0.07 K/UL
BASOPHILS # BLD AUTO: 0.07 K/UL
BASOPHILS NFR BLD: 2.5 %
BASOPHILS NFR BLD: 3.5 %
BASOPHILS NFR BLD: 3.5 %
BILIRUB DIRECT SERPL-MCNC: 0.8 MG/DL
BILIRUB SERPL-MCNC: 1.4 MG/DL
BILIRUB SERPL-MCNC: 1.5 MG/DL
BILIRUB UR QL STRIP: ABNORMAL
BNP SERPL-MCNC: 15 PG/ML
BUN SERPL-MCNC: 7 MG/DL
BUN SERPL-MCNC: 8 MG/DL
CALCIUM SERPL-MCNC: 8 MG/DL
CALCIUM SERPL-MCNC: 8.5 MG/DL
CHLORIDE SERPL-SCNC: 97 MMOL/L
CHLORIDE SERPL-SCNC: 99 MMOL/L
CLARITY UR REFRACT.AUTO: ABNORMAL
CMV DNA SERPL NAA+PROBE-ACNC: NORMAL IU/ML
CO2 SERPL-SCNC: 25 MMOL/L
CO2 SERPL-SCNC: 25 MMOL/L
COLOR UR AUTO: ABNORMAL
CORTIS SERPL-MCNC: 5.6 UG/DL
CREAT SERPL-MCNC: 0.9 MG/DL
CREAT SERPL-MCNC: 1 MG/DL
DIFFERENTIAL METHOD: ABNORMAL
EOSINOPHIL # BLD AUTO: 0.1 K/UL
EOSINOPHIL # BLD AUTO: 0.1 K/UL
EOSINOPHIL # BLD AUTO: 0.2 K/UL
EOSINOPHIL NFR BLD: 6.5 %
EOSINOPHIL NFR BLD: 6.5 %
EOSINOPHIL NFR BLD: 7.4 %
ERYTHROCYTE [DISTWIDTH] IN BLOOD BY AUTOMATED COUNT: 15.4 %
ERYTHROCYTE [DISTWIDTH] IN BLOOD BY AUTOMATED COUNT: 15.4 %
ERYTHROCYTE [DISTWIDTH] IN BLOOD BY AUTOMATED COUNT: 15.5 %
EST. GFR  (AFRICAN AMERICAN): >60 ML/MIN/1.73 M^2
EST. GFR  (AFRICAN AMERICAN): >60 ML/MIN/1.73 M^2
EST. GFR  (NON AFRICAN AMERICAN): >60 ML/MIN/1.73 M^2
EST. GFR  (NON AFRICAN AMERICAN): >60 ML/MIN/1.73 M^2
GLUCOSE SERPL-MCNC: 79 MG/DL
GLUCOSE SERPL-MCNC: 94 MG/DL
GLUCOSE UR QL STRIP: NEGATIVE
HCT VFR BLD AUTO: 30 %
HCT VFR BLD AUTO: 34.9 %
HCT VFR BLD AUTO: 34.9 %
HGB BLD-MCNC: 10.1 G/DL
HGB BLD-MCNC: 11.4 G/DL
HGB BLD-MCNC: 11.4 G/DL
HGB UR QL STRIP: NEGATIVE
IMM GRANULOCYTES # BLD AUTO: 0.01 K/UL
IMM GRANULOCYTES NFR BLD AUTO: 0.5 %
INR PPP: 1.1
KETONES UR QL STRIP: NEGATIVE
LACTATE SERPL-SCNC: 0.5 MMOL/L
LACTATE SERPL-SCNC: 0.6 MMOL/L
LEUKOCYTE ESTERASE UR QL STRIP: NEGATIVE
LIPASE SERPL-CCNC: <3 U/L
LYMPHOCYTES # BLD AUTO: 0.9 K/UL
LYMPHOCYTES # BLD AUTO: 1.1 K/UL
LYMPHOCYTES # BLD AUTO: 1.1 K/UL
LYMPHOCYTES NFR BLD: 44.8 %
LYMPHOCYTES NFR BLD: 53.5 %
LYMPHOCYTES NFR BLD: 53.5 %
MAGNESIUM SERPL-MCNC: 0.9 MG/DL
MCH RBC QN AUTO: 28.1 PG
MCH RBC QN AUTO: 28.1 PG
MCH RBC QN AUTO: 28.9 PG
MCHC RBC AUTO-ENTMCNC: 32.7 G/DL
MCHC RBC AUTO-ENTMCNC: 32.7 G/DL
MCHC RBC AUTO-ENTMCNC: 33.7 G/DL
MCV RBC AUTO: 86 FL
MONOCYTES # BLD AUTO: 0.5 K/UL
MONOCYTES # BLD AUTO: 0.5 K/UL
MONOCYTES # BLD AUTO: 0.6 K/UL
MONOCYTES NFR BLD: 25 %
MONOCYTES NFR BLD: 25 %
MONOCYTES NFR BLD: 29.6 %
NEUTROPHILS # BLD AUTO: 0.2 K/UL
NEUTROPHILS # BLD AUTO: 0.2 K/UL
NEUTROPHILS # BLD AUTO: 0.3 K/UL
NEUTROPHILS NFR BLD: 11.5 %
NEUTROPHILS NFR BLD: 11.5 %
NEUTROPHILS NFR BLD: 15.2 %
NITRITE UR QL STRIP: NEGATIVE
NRBC BLD-RTO: 0 /100 WBC
PH UR STRIP: 5 [PH] (ref 5–8)
PHOSPHATE SERPL-MCNC: 3.5 MG/DL
PLATELET # BLD AUTO: 171 K/UL
PLATELET # BLD AUTO: 195 K/UL
PLATELET # BLD AUTO: 195 K/UL
PLATELET BLD QL SMEAR: ABNORMAL
PLATELET BLD QL SMEAR: ABNORMAL
PMV BLD AUTO: 10.7 FL
PMV BLD AUTO: 10.7 FL
PMV BLD AUTO: 11.3 FL
POTASSIUM SERPL-SCNC: 2.7 MMOL/L
POTASSIUM SERPL-SCNC: 3.1 MMOL/L
PREALB SERPL-MCNC: 6 MG/DL
PROCALCITONIN SERPL IA-MCNC: 0.19 NG/ML
PROT SERPL-MCNC: 6.3 G/DL
PROT SERPL-MCNC: 6.8 G/DL
PROT UR QL STRIP: NEGATIVE
PROTHROMBIN TIME: 11.3 SEC
RBC # BLD AUTO: 3.5 M/UL
RBC # BLD AUTO: 4.05 M/UL
RBC # BLD AUTO: 4.05 M/UL
SODIUM SERPL-SCNC: 135 MMOL/L
SODIUM SERPL-SCNC: 137 MMOL/L
SP GR UR STRIP: 1.02 (ref 1–1.03)
TROPONIN I SERPL DL<=0.01 NG/ML-MCNC: <0.006 NG/ML
TSH SERPL DL<=0.005 MIU/L-ACNC: 2.19 UIU/ML
URN SPEC COLLECT METH UR: ABNORMAL
UROBILINOGEN UR STRIP-ACNC: 4 EU/DL
WBC # BLD AUTO: 2 K/UL
WBC # BLD AUTO: 2 K/UL
WBC # BLD AUTO: 2.03 K/UL

## 2018-02-12 PROCEDURE — 85025 COMPLETE CBC W/AUTO DIFF WBC: CPT | Mod: PO

## 2018-02-12 PROCEDURE — 84443 ASSAY THYROID STIM HORMONE: CPT

## 2018-02-12 PROCEDURE — 81003 URINALYSIS AUTO W/O SCOPE: CPT

## 2018-02-12 PROCEDURE — 63600175 PHARM REV CODE 636 W HCPCS: Performed by: EMERGENCY MEDICINE

## 2018-02-12 PROCEDURE — 83605 ASSAY OF LACTIC ACID: CPT | Mod: 91

## 2018-02-12 PROCEDURE — 84484 ASSAY OF TROPONIN QUANT: CPT

## 2018-02-12 PROCEDURE — 93005 ELECTROCARDIOGRAM TRACING: CPT

## 2018-02-12 PROCEDURE — 85025 COMPLETE CBC W/AUTO DIFF WBC: CPT | Mod: 91

## 2018-02-12 PROCEDURE — 82248 BILIRUBIN DIRECT: CPT | Mod: PO

## 2018-02-12 PROCEDURE — 83690 ASSAY OF LIPASE: CPT

## 2018-02-12 PROCEDURE — 83880 ASSAY OF NATRIURETIC PEPTIDE: CPT

## 2018-02-12 PROCEDURE — G0378 HOSPITAL OBSERVATION PER HR: HCPCS

## 2018-02-12 PROCEDURE — 84134 ASSAY OF PREALBUMIN: CPT

## 2018-02-12 PROCEDURE — 36415 COLL VENOUS BLD VENIPUNCTURE: CPT | Mod: PO

## 2018-02-12 PROCEDURE — 96374 THER/PROPH/DIAG INJ IV PUSH: CPT

## 2018-02-12 PROCEDURE — 87040 BLOOD CULTURE FOR BACTERIA: CPT

## 2018-02-12 PROCEDURE — 93010 ELECTROCARDIOGRAM REPORT: CPT | Mod: ,,, | Performed by: INTERNAL MEDICINE

## 2018-02-12 PROCEDURE — 94761 N-INVAS EAR/PLS OXIMETRY MLT: CPT

## 2018-02-12 PROCEDURE — 80053 COMPREHEN METABOLIC PANEL: CPT | Mod: PO

## 2018-02-12 PROCEDURE — 80197 ASSAY OF TACROLIMUS: CPT

## 2018-02-12 PROCEDURE — 85730 THROMBOPLASTIN TIME PARTIAL: CPT

## 2018-02-12 PROCEDURE — 99285 EMERGENCY DEPT VISIT HI MDM: CPT | Mod: 25

## 2018-02-12 PROCEDURE — 87086 URINE CULTURE/COLONY COUNT: CPT

## 2018-02-12 PROCEDURE — 25000003 PHARM REV CODE 250: Performed by: EMERGENCY MEDICINE

## 2018-02-12 PROCEDURE — 36415 COLL VENOUS BLD VENIPUNCTURE: CPT

## 2018-02-12 PROCEDURE — 84145 PROCALCITONIN (PCT): CPT

## 2018-02-12 PROCEDURE — 83735 ASSAY OF MAGNESIUM: CPT

## 2018-02-12 PROCEDURE — 63600175 PHARM REV CODE 636 W HCPCS: Mod: JG | Performed by: STUDENT IN AN ORGANIZED HEALTH CARE EDUCATION/TRAINING PROGRAM

## 2018-02-12 PROCEDURE — 83605 ASSAY OF LACTIC ACID: CPT

## 2018-02-12 PROCEDURE — 84100 ASSAY OF PHOSPHORUS: CPT

## 2018-02-12 PROCEDURE — 85610 PROTHROMBIN TIME: CPT

## 2018-02-12 PROCEDURE — 82533 TOTAL CORTISOL: CPT

## 2018-02-12 PROCEDURE — 80053 COMPREHEN METABOLIC PANEL: CPT | Mod: 91

## 2018-02-12 PROCEDURE — 25000003 PHARM REV CODE 250: Performed by: STUDENT IN AN ORGANIZED HEALTH CARE EDUCATION/TRAINING PROGRAM

## 2018-02-12 RX ORDER — NIFEDIPINE 20 MG/1
20 CAPSULE ORAL EVERY 8 HOURS
Status: DISCONTINUED | OUTPATIENT
Start: 2018-02-12 | End: 2018-02-12

## 2018-02-12 RX ORDER — NIFEDIPINE 60 MG/1
60 TABLET, EXTENDED RELEASE ORAL DAILY
Status: DISCONTINUED | OUTPATIENT
Start: 2018-02-13 | End: 2018-02-15 | Stop reason: HOSPADM

## 2018-02-12 RX ORDER — TACROLIMUS 1 MG/1
4 CAPSULE ORAL 2 TIMES DAILY
Status: DISCONTINUED | OUTPATIENT
Start: 2018-02-12 | End: 2018-02-15 | Stop reason: HOSPADM

## 2018-02-12 RX ORDER — RAMELTEON 8 MG/1
8 TABLET ORAL NIGHTLY PRN
Status: DISCONTINUED | OUTPATIENT
Start: 2018-02-12 | End: 2018-02-15 | Stop reason: HOSPADM

## 2018-02-12 RX ORDER — ASPIRIN 81 MG/1
81 TABLET ORAL DAILY
Status: DISCONTINUED | OUTPATIENT
Start: 2018-02-13 | End: 2018-02-15 | Stop reason: HOSPADM

## 2018-02-12 RX ORDER — OXYCODONE HYDROCHLORIDE 5 MG/1
5 TABLET ORAL EVERY 4 HOURS PRN
Status: DISCONTINUED | OUTPATIENT
Start: 2018-02-12 | End: 2018-02-15 | Stop reason: HOSPADM

## 2018-02-12 RX ORDER — HEPARIN SODIUM 5000 [USP'U]/ML
5000 INJECTION, SOLUTION INTRAVENOUS; SUBCUTANEOUS EVERY 8 HOURS
Status: DISCONTINUED | OUTPATIENT
Start: 2018-02-13 | End: 2018-02-15 | Stop reason: HOSPADM

## 2018-02-12 RX ORDER — BISACODYL 5 MG
5 TABLET, DELAYED RELEASE (ENTERIC COATED) ORAL DAILY PRN
Status: DISCONTINUED | OUTPATIENT
Start: 2018-02-12 | End: 2018-02-15 | Stop reason: HOSPADM

## 2018-02-12 RX ORDER — ACETAMINOPHEN 325 MG/1
650 TABLET ORAL EVERY 8 HOURS PRN
Status: DISCONTINUED | OUTPATIENT
Start: 2018-02-12 | End: 2018-02-15 | Stop reason: HOSPADM

## 2018-02-12 RX ORDER — SULFAMETHOXAZOLE AND TRIMETHOPRIM 400; 80 MG/1; MG/1
1 TABLET ORAL DAILY
Status: DISCONTINUED | OUTPATIENT
Start: 2018-02-13 | End: 2018-02-13

## 2018-02-12 RX ORDER — ONDANSETRON 2 MG/ML
4 INJECTION INTRAMUSCULAR; INTRAVENOUS EVERY 12 HOURS PRN
Status: DISCONTINUED | OUTPATIENT
Start: 2018-02-12 | End: 2018-02-15 | Stop reason: HOSPADM

## 2018-02-12 RX ORDER — CEFEPIME HYDROCHLORIDE 2 G/1
2 INJECTION, POWDER, FOR SOLUTION INTRAVENOUS
Status: DISCONTINUED | OUTPATIENT
Start: 2018-02-13 | End: 2018-02-14

## 2018-02-12 RX ORDER — FAMOTIDINE 20 MG/1
20 TABLET, FILM COATED ORAL NIGHTLY
Status: DISCONTINUED | OUTPATIENT
Start: 2018-02-12 | End: 2018-02-15 | Stop reason: HOSPADM

## 2018-02-12 RX ORDER — SODIUM CHLORIDE 0.9 % (FLUSH) 0.9 %
3 SYRINGE (ML) INJECTION
Status: DISCONTINUED | OUTPATIENT
Start: 2018-02-12 | End: 2018-02-15 | Stop reason: HOSPADM

## 2018-02-12 RX ORDER — POTASSIUM CHLORIDE 7.45 MG/ML
20 INJECTION INTRAVENOUS ONCE
Status: DISCONTINUED | OUTPATIENT
Start: 2018-02-12 | End: 2018-02-13

## 2018-02-12 RX ORDER — CEFEPIME HYDROCHLORIDE 2 G/1
2 INJECTION, POWDER, FOR SOLUTION INTRAVENOUS
Status: COMPLETED | OUTPATIENT
Start: 2018-02-12 | End: 2018-02-12

## 2018-02-12 RX ORDER — DIPHENHYDRAMINE HYDROCHLORIDE 50 MG/ML
25 INJECTION INTRAMUSCULAR; INTRAVENOUS EVERY 4 HOURS PRN
Status: DISCONTINUED | OUTPATIENT
Start: 2018-02-12 | End: 2018-02-15 | Stop reason: HOSPADM

## 2018-02-12 RX ORDER — POTASSIUM CHLORIDE 20 MEQ/1
40 TABLET, EXTENDED RELEASE ORAL
Status: COMPLETED | OUTPATIENT
Start: 2018-02-12 | End: 2018-02-12

## 2018-02-12 RX ORDER — MAGNESIUM SULFATE HEPTAHYDRATE 40 MG/ML
2 INJECTION, SOLUTION INTRAVENOUS
Status: COMPLETED | OUTPATIENT
Start: 2018-02-12 | End: 2018-02-13

## 2018-02-12 RX ADMIN — ONDANSETRON 4 MG: 2 INJECTION INTRAMUSCULAR; INTRAVENOUS at 10:02

## 2018-02-12 RX ADMIN — POTASSIUM CHLORIDE 40 MEQ: 1500 TABLET, EXTENDED RELEASE ORAL at 10:02

## 2018-02-12 RX ADMIN — FAMOTIDINE 20 MG: 20 TABLET, FILM COATED ORAL at 10:02

## 2018-02-12 RX ADMIN — TBO-FILGRASTIM 300 MCG: 300 INJECTION, SOLUTION SUBCUTANEOUS at 11:02

## 2018-02-12 RX ADMIN — CEFEPIME 2 G: 2 INJECTION, POWDER, FOR SOLUTION INTRAVENOUS at 06:02

## 2018-02-12 RX ADMIN — OXYCODONE HYDROCHLORIDE 5 MG: 5 TABLET ORAL at 11:02

## 2018-02-12 RX ADMIN — MAGNESIUM SULFATE IN WATER 2 G: 40 INJECTION, SOLUTION INTRAVENOUS at 10:02

## 2018-02-12 NOTE — ED TRIAGE NOTES
Patient was sent here by Dr. Souza for low WBC count and a fever post liver transplant.  Patient c/o weakness, nausea, vomiting and abdominal pain.  Patient states she has been sick since Wednesday and was placed on Tamiflu.

## 2018-02-12 NOTE — TELEPHONE ENCOUNTER
Documentation Only:    Faxed Prior Authorization form and supporting documentation for [employer] to insurance on 02/12/2018.

## 2018-02-12 NOTE — ED TRIAGE NOTES
Pt sent for evaluation of fevers and fatigue after liver transplant on 12.26.2017 - states generalized pain - nausea and dry heaves - mask placed on patient in triage

## 2018-02-12 NOTE — TELEPHONE ENCOUNTER
"Discussed patient's WBC and ANC with Dr Souza.  Called patient she reports, " Having fever of 100.6, to 99.9".  Advised patient that Dr Souza wants her to come to [facility] ER.  I stressed how important it is she come because her immune system is so low.  "

## 2018-02-12 NOTE — ED PROVIDER NOTES
Encounter Date: 2/12/2018    SCRIBE #1 NOTE: IRichard, am scribing for, and in the presence of, Dr. Palacios.       History     Chief Complaint   Patient presents with    Fever     Pt sent for evaluation of fevers and fatigue after liver transplant on 12.26.2017 - states generalized pain - nausea and dry heaves      Time patient was seen by the provider: 5:05 PM      The patient is a 52 y.o. female with hx of HTN, liver cirrhosis status post liver transplant(12/26/17) who presents to the ED with a complaint of fever, which has occurred intermittently for several days. Tmax 100.6. She notes an associated nausea, fatigue, generalized body aches, shortness of breath, reduced PO intake, rhinorrhea, and cough(resolved). Pt was recently diagnosed with the flu and prescribed Tamiflu. Pt presents today after being told to come to the ED by Dr. Souza due to a low WBC count demonstrated on recent lab work. No leg swelling.       The history is provided by the patient and medical records.     Review of patient's allergies indicates:   Allergen Reactions    Ferrous sulfate Other (See Comments)     Patient states the pill makes her sick. She stated she would rather have a shot     Past Medical History:   Diagnosis Date    Alcohol abuse     Alcoholic hepatitis     Anemia of chronic disease     Coagulopathy     End stage liver disease     Hypertension     Hyponatremia     Liver cirrhosis     Liver transplant candidate     Obesity (BMI 30-39.9) 1/5/2016     Past Surgical History:   Procedure Laterality Date    BREAST BIOPSY      CHOLECYSTECTOMY      COLONOSCOPY N/A 12/1/2017    Procedure: COLONOSCOPY;  Surgeon: Filemon Fuentes MD;  Location: [facility] ([address] [place] [address]);  Service: Endoscopy;  Laterality: N/A;    COLONOSCOPY N/A 12/5/2017    Procedure: COLONOSCOPY;  Surgeon: José Chavira MD;  Location: [facility] ([address] [place] [address]);  Service: Endoscopy;  Laterality: N/A;    HYSTERECTOMY      LIVER TRANSPLANT  12/2017      Family History   Problem Relation Age of Onset    Hypertension Mother     Hypertension Father     Diabetes Father     Diabetes Sister     Depression Maternal Grandfather     Breast cancer Paternal Aunt      Social History   Substance Use Topics    Smoking status: Never Smoker    Smokeless tobacco: Never Used    Alcohol use No      Comment: Currently admitted to inpatient rehab     Review of Systems   Constitutional: Positive for fatigue and fever.        Generalized body aches    HENT: Positive for rhinorrhea. Negative for sore throat.    Respiratory: Positive for cough and shortness of breath.    Cardiovascular: Negative for chest pain and leg swelling.   Gastrointestinal: Positive for nausea.   Genitourinary: Negative for dysuria.   Musculoskeletal: Negative for back pain.   Skin: Negative for rash.   Neurological: Negative for weakness.   Hematological: Does not bruise/bleed easily.       Physical Exam     Initial Vitals [02/12/18 1557]   BP Pulse Resp Temp SpO2   (!) 141/82 99 20 99.4 °F (37.4 °C) 97 %      MAP       101.67         Physical Exam    Vitals reviewed.  Constitutional:   Awake, alert, and cooperative    HENT:   Mouth/Throat: Oropharynx is clear and moist.   Minimal sublingual icterus. No nasal discharge.    Eyes: EOM are normal.   Conjunctival pallor   Neck: Normal range of motion. Neck supple.   Cardiovascular: Normal rate, regular rhythm and intact distal pulses. Exam reveals no gallop and no friction rub.    No murmur heard.  No peripheral edema    Pulmonary/Chest:   Clear to auscultation    Abdominal: Soft. She exhibits no distension. There is no tenderness. There is no rebound and no guarding.   Abdominal scar    Musculoskeletal: Normal range of motion.   Active range of motion to all extremities    Neurological: She is alert and oriented to person, place, and time.   Motor and sensory grossly intact    Skin: Skin is warm and dry. Capillary refill takes less than 2 seconds. No rash  noted.   No lesions          ED Course   Procedures  Labs Reviewed   CBC W/ AUTO DIFFERENTIAL - Abnormal; Notable for the following:        Result Value    WBC 2.03 (*)     RBC 3.50 (*)     Hemoglobin 10.1 (*)     Hematocrit 30.0 (*)     RDW 15.5 (*)     Gran # (ANC) 0.3 (*)     Lymph # 0.9 (*)     Gran% 15.2 (*)     Mono% 29.6 (*)     Basophil% 2.5 (*)     All other components within normal limits   COMPREHENSIVE METABOLIC PANEL - Abnormal; Notable for the following:     Sodium 135 (*)     Potassium 2.7 (*)     Calcium 8.0 (*)     Albumin 2.9 (*)     Total Bilirubin 1.5 (*)     Alkaline Phosphatase 456 (*)     All other components within normal limits   LIPASE - Abnormal; Notable for the following:     Lipase <3 (*)     All other components within normal limits   MAGNESIUM - Abnormal; Notable for the following:     Magnesium 0.9 (*)     All other components within normal limits   URINALYSIS, REFLEX TO URINE CULTURE - Abnormal; Notable for the following:     Appearance, UA Hazy (*)     Bilirubin (UA) 1+ (*)     All other components within normal limits    Narrative:     Preferred Collection Type->Urine, Clean Catch   CULTURE, URINE   APTT   B-TYPE NATRIURETIC PEPTIDE   CORTISOL, RANDOM   LACTIC ACID, PLASMA   PHOSPHORUS   PROTIME-INR   PROCALCITONIN   TROPONIN I   TSH     EKG Readings: (Independently Interpreted)   normal sinus rhythm, rate 85, WI interval 128, QRS duration 68, QT/QTc 354/421          Medical Decision Making:   History:   Old Medical Records: I decided to obtain old medical records.  Independently Interpreted Test(s):   I have ordered and independently interpreted EKG Reading(s) - see prior notes  Clinical Tests:   Lab Tests: Ordered and Reviewed  Radiological Study: Ordered and Reviewed  Medical Tests: Ordered and Reviewed  ED Management:  ANC of 200 and reported fever. Also noted significant elec abnormality. Will need full admit. Discussed with Hepatic transplant service.             Molly  Attestation:   Scribe #1: I performed the above scribed service and the documentation accurately describes the services I performed. I attest to the accuracy of the note.    Attending Attestation:         Attending Critical Care:   Critical Care Times:   Direct Patient Care (initial evaluation, reassessments, and time considering the case)................................................................30 minutes.   Additional History from reviewing old medical records or taking additional history from the family, EMS, PCP, etc.......................5 minutes.   Documentation..................................................................................................................................................................................5 minutes.   Consultation with other Physicians. .................................................................................................................................................5 minutes.   ==============================================================  · Total Critical Care Time - exclusive of procedural time: 45 minutes.  ==============================================================  Critical care was necessary to treat or prevent imminent or life-threatening deterioration of the following conditions: sepsis.   Critical care was time spent personally by me on the following activities: obtaining history from patient or relative, examination of patient, review of old charts, ordering lab, x-rays, and/or EKG, development of treatment plan with patient or relative, ordering and performing treatments and interventions, evaluation of patient's response to treatment, discussion with consultants and re-evaluation of patient's conition.   Critical Care Condition: critical     Physician Attestation for Chester:      Comments: I, Dr. Spike Palacios, personally performed the services described in this documentation. All medical record entries made by the chester  were at my direction and in my presence.  I have reviewed the chart and agree that the record reflects my personal performance and is accurate and complete. Spike Palacios MD.  8:52 PM 02/12/2018                     Clinical Impression:   The primary encounter diagnosis was Neutropenic fever. Diagnoses of Hypokalemia, Hypomagnesemia, Leukopenia, Anemia of chronic disease, Neutropenia, unspecified type, Prophylactic immunotherapy, S/P liver transplant, At risk for opportunistic infections, and Long-term use of immunosuppressant medication were also pertinent to this visit.                           Spike Palacios MD  02/12/18 2052       Spike Palacios MD  02/12/18 2057       Spike Palacios MD  02/16/18 [id number]

## 2018-02-13 LAB
ALBUMIN SERPL BCP-MCNC: 2.6 G/DL
ALP SERPL-CCNC: 385 U/L
ALT SERPL W/O P-5'-P-CCNC: 19 U/L
ANION GAP SERPL CALC-SCNC: 12 MMOL/L
ANISOCYTOSIS BLD QL SMEAR: SLIGHT
AST SERPL-CCNC: 21 U/L
BACTERIA UR CULT: NORMAL
BASOPHILS # BLD AUTO: 0.07 K/UL
BASOPHILS NFR BLD: 1.7 %
BILIRUB SERPL-MCNC: 1.6 MG/DL
BUN SERPL-MCNC: 9 MG/DL
CALCIUM SERPL-MCNC: 7.8 MG/DL
CHLORIDE SERPL-SCNC: 101 MMOL/L
CO2 SERPL-SCNC: 22 MMOL/L
CREAT SERPL-MCNC: 0.8 MG/DL
DIFFERENTIAL METHOD: ABNORMAL
EOSINOPHIL # BLD AUTO: 0.2 K/UL
EOSINOPHIL NFR BLD: 4.2 %
ERYTHROCYTE [DISTWIDTH] IN BLOOD BY AUTOMATED COUNT: 15.4 %
EST. GFR  (AFRICAN AMERICAN): >60 ML/MIN/1.73 M^2
EST. GFR  (NON AFRICAN AMERICAN): >60 ML/MIN/1.73 M^2
GLUCOSE SERPL-MCNC: 80 MG/DL
HCT VFR BLD AUTO: 29.5 %
HGB BLD-MCNC: 9.7 G/DL
IMM GRANULOCYTES # BLD AUTO: 0.04 K/UL
IMM GRANULOCYTES NFR BLD AUTO: 1 %
LACTATE SERPL-SCNC: 0.7 MMOL/L
LYMPHOCYTES # BLD AUTO: 0.9 K/UL
LYMPHOCYTES NFR BLD: 21.6 %
MAGNESIUM SERPL-MCNC: 1.5 MG/DL
MCH RBC QN AUTO: 28 PG
MCHC RBC AUTO-ENTMCNC: 32.9 G/DL
MCV RBC AUTO: 85 FL
MONOCYTES # BLD AUTO: 0.9 K/UL
MONOCYTES NFR BLD: 21.6 %
NEUTROPHILS # BLD AUTO: 2 K/UL
NEUTROPHILS NFR BLD: 49.9 %
NRBC BLD-RTO: 0 /100 WBC
PLATELET # BLD AUTO: 156 K/UL
PLATELET BLD QL SMEAR: ABNORMAL
PMV BLD AUTO: 11.4 FL
POTASSIUM SERPL-SCNC: 3.3 MMOL/L
PROT SERPL-MCNC: 5.7 G/DL
RBC # BLD AUTO: 3.46 M/UL
SODIUM SERPL-SCNC: 135 MMOL/L
TACROLIMUS BLD-MCNC: 8 NG/ML
TACROLIMUS BLD-MCNC: 9.6 NG/ML
WBC # BLD AUTO: 4.07 K/UL

## 2018-02-13 PROCEDURE — 25000003 PHARM REV CODE 250: Performed by: TRANSPLANT SURGERY

## 2018-02-13 PROCEDURE — 99204 OFFICE O/P NEW MOD 45 MIN: CPT | Mod: ,,, | Performed by: INTERNAL MEDICINE

## 2018-02-13 PROCEDURE — 25000003 PHARM REV CODE 250: Performed by: STUDENT IN AN ORGANIZED HEALTH CARE EDUCATION/TRAINING PROGRAM

## 2018-02-13 PROCEDURE — 99214 OFFICE O/P EST MOD 30 MIN: CPT | Mod: 24,,, | Performed by: NURSE PRACTITIONER

## 2018-02-13 PROCEDURE — 83735 ASSAY OF MAGNESIUM: CPT

## 2018-02-13 PROCEDURE — 80197 ASSAY OF TACROLIMUS: CPT

## 2018-02-13 PROCEDURE — 63600175 PHARM REV CODE 636 W HCPCS: Performed by: STUDENT IN AN ORGANIZED HEALTH CARE EDUCATION/TRAINING PROGRAM

## 2018-02-13 PROCEDURE — 80053 COMPREHEN METABOLIC PANEL: CPT

## 2018-02-13 PROCEDURE — G0378 HOSPITAL OBSERVATION PER HR: HCPCS

## 2018-02-13 PROCEDURE — 36415 COLL VENOUS BLD VENIPUNCTURE: CPT

## 2018-02-13 PROCEDURE — 83605 ASSAY OF LACTIC ACID: CPT

## 2018-02-13 PROCEDURE — 85025 COMPLETE CBC W/AUTO DIFF WBC: CPT

## 2018-02-13 RX ORDER — POTASSIUM CHLORIDE 750 MG/1
40 CAPSULE, EXTENDED RELEASE ORAL ONCE
Status: COMPLETED | OUTPATIENT
Start: 2018-02-13 | End: 2018-02-13

## 2018-02-13 RX ORDER — POTASSIUM CHLORIDE 20 MEQ/1
40 TABLET, EXTENDED RELEASE ORAL ONCE
Status: DISCONTINUED | OUTPATIENT
Start: 2018-02-13 | End: 2018-02-13

## 2018-02-13 RX ORDER — CYCLOBENZAPRINE HCL 5 MG
5 TABLET ORAL ONCE
Status: COMPLETED | OUTPATIENT
Start: 2018-02-13 | End: 2018-02-13

## 2018-02-13 RX ADMIN — NIFEDIPINE 60 MG: 60 TABLET, FILM COATED, EXTENDED RELEASE ORAL at 08:02

## 2018-02-13 RX ADMIN — HEPARIN SODIUM 5000 UNITS: 5000 INJECTION, SOLUTION INTRAVENOUS; SUBCUTANEOUS at 08:02

## 2018-02-13 RX ADMIN — ASPIRIN 81 MG: 81 TABLET, COATED ORAL at 08:02

## 2018-02-13 RX ADMIN — OXYCODONE HYDROCHLORIDE 5 MG: 5 TABLET ORAL at 07:02

## 2018-02-13 RX ADMIN — FAMOTIDINE 20 MG: 20 TABLET, FILM COATED ORAL at 08:02

## 2018-02-13 RX ADMIN — ONDANSETRON 4 MG: 2 INJECTION INTRAMUSCULAR; INTRAVENOUS at 10:02

## 2018-02-13 RX ADMIN — CYCLOBENZAPRINE HYDROCHLORIDE 5 MG: 5 TABLET, FILM COATED ORAL at 08:02

## 2018-02-13 RX ADMIN — POTASSIUM CHLORIDE 40 MEQ: 750 CAPSULE, EXTENDED RELEASE ORAL at 11:02

## 2018-02-13 RX ADMIN — CEFEPIME 2 G: 2 INJECTION, POWDER, FOR SOLUTION INTRAVENOUS at 10:02

## 2018-02-13 RX ADMIN — CEFEPIME 2 G: 2 INJECTION, POWDER, FOR SOLUTION INTRAVENOUS at 05:02

## 2018-02-13 RX ADMIN — SULFAMETHOXAZOLE AND TRIMETHOPRIM 1 TABLET: 400; 80 TABLET ORAL at 08:02

## 2018-02-13 RX ADMIN — HEPARIN SODIUM 5000 UNITS: 5000 INJECTION, SOLUTION INTRAVENOUS; SUBCUTANEOUS at 02:02

## 2018-02-13 RX ADMIN — TRAZODONE HYDROCHLORIDE 25 MG: 50 TABLET ORAL at 08:02

## 2018-02-13 RX ADMIN — CEFEPIME 2 G: 2 INJECTION, POWDER, FOR SOLUTION INTRAVENOUS at 03:02

## 2018-02-13 RX ADMIN — TACROLIMUS 4 MG: 1 CAPSULE ORAL at 05:02

## 2018-02-13 RX ADMIN — OXYCODONE HYDROCHLORIDE 5 MG: 5 TABLET ORAL at 10:02

## 2018-02-13 RX ADMIN — ONDANSETRON 4 MG: 2 INJECTION INTRAMUSCULAR; INTRAVENOUS at 07:02

## 2018-02-13 RX ADMIN — PROMETHAZINE HYDROCHLORIDE 6.25 MG: 25 INJECTION INTRAMUSCULAR; INTRAVENOUS at 12:02

## 2018-02-13 RX ADMIN — TACROLIMUS 4 MG: 1 CAPSULE ORAL at 08:02

## 2018-02-13 NOTE — HPI
Patient is a 51yo F with PMHx of HTN and alcoholic liver cirrhosis s/p recent OLTx (12/26/17) who presented to ED c/o fatigue, n/v, and generalized body aches for over a week. Patient also had subjective fevers at home (Tm 100.6 at home, none here). She was admitted and started on empiric antibiotics for presumed sepsis. Hematology consulted for use of neupogen in the setting of severe neutropenia ( on presentation). Of note, patient was on cellcept and valcyte until last week.

## 2018-02-13 NOTE — ASSESSMENT & PLAN NOTE
- WBC 2.0 ,  on 2/12/18  - neutropenia likely related to cellcept, valcyte, and underlying infection  - reasonable to start Neupogen daily until counts recover

## 2018-02-13 NOTE — SUBJECTIVE & OBJECTIVE
Oncology Treatment Plan:   [No treatment plan]    Medications:  Continuous Infusions:  Scheduled Meds:   aspirin  81 mg Oral Daily    ceFEPime (MAXIPIME) IVPB  2 g Intravenous Q8H    famotidine  20 mg Oral QHS    heparin (porcine)  5,000 Units Subcutaneous Q8H    NIFEdipine  60 mg Oral Daily    potassium chloride  20 mEq Intravenous Once    sulfamethoxazole-trimethoprim 400-80mg  1 tablet Oral Daily    tacrolimus  4 mg Oral BID    traZODone  25 mg Oral QHS     PRN Meds:acetaminophen, bisacodyl, diphenhydrAMINE, ondansetron, oxyCODONE, promethazine (PHENERGAN) IVPB, ramelteon, sodium chloride 0.9%     Review of patient's allergies indicates:   Allergen Reactions    Ferrous sulfate Other (See Comments)     Patient states the pill makes her sick. She stated she would rather have a shot        Past Medical History:   Diagnosis Date    Alcohol abuse     Alcoholic hepatitis     Anemia of chronic disease     Coagulopathy     End stage liver disease     Hypertension     Hyponatremia     Liver cirrhosis     Liver transplant candidate     Obesity (BMI 30-39.9) 1/5/2016     Past Surgical History:   Procedure Laterality Date    BREAST BIOPSY      CHOLECYSTECTOMY      COLONOSCOPY N/A 12/1/2017    Procedure: COLONOSCOPY;  Surgeon: Filemon Fuentes MD;  Location: [facility] ([address] [place] [address]);  Service: Endoscopy;  Laterality: N/A;    COLONOSCOPY N/A 12/5/2017    Procedure: COLONOSCOPY;  Surgeon: José Chavira MD;  Location: [facility] ([address] [place] [address]);  Service: Endoscopy;  Laterality: N/A;    HYSTERECTOMY      LIVER TRANSPLANT  12/2017     Family History     Problem Relation (Age of Onset)    Breast cancer Paternal Aunt    Depression Maternal Grandfather    Diabetes Father, Sister    Hypertension Mother, Father        Social History Main Topics    Smoking status: Never Smoker    Smokeless tobacco: Never Used    Alcohol use No      Comment: Currently admitted to inpatient rehab    Drug use: No    Sexual activity:  Not Currently       Review of Systems   Constitutional: Positive for chills, fatigue and fever.   HENT: Negative for sore throat and trouble swallowing.    Respiratory: Positive for cough (chronic dry cough, unchanged). Negative for shortness of breath.    Cardiovascular: Negative for chest pain and palpitations.   Gastrointestinal: Positive for abdominal pain (chronic since surgery, unchanged), nausea and vomiting.   Genitourinary: Negative for dysuria and hematuria.   Musculoskeletal: Positive for arthralgias and myalgias.   Neurological: Negative for dizziness and seizures.   Hematological: Negative for adenopathy.   Psychiatric/Behavioral: Negative for confusion.     Objective:     Vital Signs (Most Recent):  Temp: 99.2 °F (37.3 °C) (02/13/18 0412)  Pulse: 88 (02/13/18 0412)  Resp: 18 (02/13/18 0412)  BP: 129/72 (02/13/18 0412)  SpO2: 98 % (02/13/18 0412) Vital Signs (24h Range):  Temp:  [99.2 °F (37.3 °C)-99.4 °F (37.4 °C)] 99.2 °F (37.3 °C)  Pulse:  [80-99] 88  Resp:  [17-20] 18  SpO2:  [90 %-100 %] 98 %  BP: (129-149)/(71-93) 129/72     Weight: 54 kg (119 lb 0.8 oz)  Body mass index is 21.09 kg/m².  Body surface area is 1.55 meters squared.    No intake or output data in the 24 hours ending 02/13/18 0745    Physical Exam   Constitutional: She is oriented to person, place, and time. She appears well-developed and well-nourished. No distress.   HENT:   Head: Normocephalic and atraumatic.   Right Ear: External ear normal.   Left Ear: External ear normal.   Eyes: EOM are normal. Pupils are equal, round, and reactive to light. Right eye exhibits no discharge. Left eye exhibits no discharge.   Neck: Normal range of motion. Neck supple. No tracheal deviation present.   Cardiovascular: Normal rate and regular rhythm.    No murmur heard.  Pulmonary/Chest: Effort normal and breath sounds normal. No stridor. No respiratory distress. She has no wheezes.   Abdominal: Soft. Bowel sounds are normal. There is tenderness.  There is no guarding.   +surgical scar   Musculoskeletal: Normal range of motion. She exhibits no tenderness.   Neurological: She is alert and oriented to person, place, and time.   Skin: Skin is warm and dry. She is not diaphoretic.   Psychiatric: She has a normal mood and affect. Her behavior is normal.       Significant Labs:   CBC:   Recent Labs  Lab 02/12/18  0740 02/12/18  1738   WBC 2.00*  2.00* 2.03*   HGB 11.4*  11.4* 10.1*   HCT 34.9*  34.9* 30.0*     195 171    and CMP:   Recent Labs  Lab 02/12/18  0740 02/12/18  1738 02/13/18  0513    135* 135*   K 3.1* 2.7* 3.3*   CL 99 97 101   CO2 25 25 22*   GLU 94 79 80   BUN 7 8 9   CREATININE 0.9 1.0 0.8   CALCIUM 8.5* 8.0* 7.8*   PROT 6.8 6.3 5.7*   ALBUMIN 3.4* 2.9* 2.6*   BILITOT 1.4* 1.5* 1.6*   ALKPHOS 458* 456* 385*   AST 25 37 21   ALT 22 24 19   ANIONGAP 13 13 12   EGFRNONAA >60 >60.0 >60.0       Diagnostic Results:  I have reviewed all pertinent imaging results/findings within the past 24 hours.

## 2018-02-13 NOTE — HPI
Marcie Bazan is a 52 y.o. 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 The patient comes to the ED with a week of decreasing ANC to 0.2 today. She states that she has felt weak, has not been able to eat well due to vomiting, and has body aches. The patient had subjected fevers at home but no documented fevers while in the Emergency Department. She started to have a down trend in her ANC on 2/6 and had her valcyte and cellcept held at that time. She continues to take tacrolimus. The patient appears well at this time but weak. She has no other complaints.

## 2018-02-13 NOTE — CONSULTS
[facility]  Hematology/Oncology  Consult Note    Patient Name: Marcie Bazan  MRN: [id number]  Admission Date: 2/12/2018  Hospital Length of Stay: 0 days  Code Status: Full Code   Attending Provider: Ashley Garduno MD  Consulting Provider: Nathaniel Grijalva MD  Primary Care Physician: ODILIA Wall  Principal Problem:<principal problem not specified>    Inpatient consult to Hematology  Consult performed by: NATHANIEL GRIJALVA  Consult ordered by: OSCAR EATON        Subjective:     HPI:  Patient is a 51yo F with PMHx of HTN and alcoholic liver cirrhosis s/p recent OLTx (12/26/17) who presented to ED c/o fatigue, n/v, and generalized body aches for over a week. Patient also had subjective fevers at home (Tm 100.6 at home, none here). She was admitted and started on empiric antibiotics for presumed sepsis. Hematology consulted for use of neupogen in the setting of severe neutropenia ( on presentation). Of note, patient was on cellcept and valcyte until last week.     Oncology Treatment Plan:   [No treatment plan]    Medications:  Continuous Infusions:  Scheduled Meds:   aspirin  81 mg Oral Daily    ceFEPime (MAXIPIME) IVPB  2 g Intravenous Q8H    famotidine  20 mg Oral QHS    heparin (porcine)  5,000 Units Subcutaneous Q8H    NIFEdipine  60 mg Oral Daily    potassium chloride  20 mEq Intravenous Once    sulfamethoxazole-trimethoprim 400-80mg  1 tablet Oral Daily    tacrolimus  4 mg Oral BID    traZODone  25 mg Oral QHS     PRN Meds:acetaminophen, bisacodyl, diphenhydrAMINE, ondansetron, oxyCODONE, promethazine (PHENERGAN) IVPB, ramelteon, sodium chloride 0.9%     Review of patient's allergies indicates:   Allergen Reactions    Ferrous sulfate Other (See Comments)     Patient states the pill makes her sick. She stated she would rather have a shot        Past Medical History:   Diagnosis Date    Alcohol abuse     Alcoholic hepatitis     Anemia of chronic disease      Coagulopathy     End stage liver disease     Hypertension     Hyponatremia     Liver cirrhosis     Liver transplant candidate     Obesity (BMI 30-39.9) 1/5/2016     Past Surgical History:   Procedure Laterality Date    BREAST BIOPSY      CHOLECYSTECTOMY      COLONOSCOPY N/A 12/1/2017    Procedure: COLONOSCOPY;  Surgeon: Filemon Fuentes MD;  Location: [facility] ([address] [place] [address]);  Service: Endoscopy;  Laterality: N/A;    COLONOSCOPY N/A 12/5/2017    Procedure: COLONOSCOPY;  Surgeon: José Chavira MD;  Location: [facility] ([address] [place] [address]);  Service: Endoscopy;  Laterality: N/A;    HYSTERECTOMY      LIVER TRANSPLANT  12/2017     Family History     Problem Relation (Age of Onset)    Breast cancer Paternal Aunt    Depression Maternal Grandfather    Diabetes Father, Sister    Hypertension Mother, Father        Social History Main Topics    Smoking status: Never Smoker    Smokeless tobacco: Never Used    Alcohol use No      Comment: Currently admitted to inpatient rehab    Drug use: No    Sexual activity: Not Currently       Review of Systems   Constitutional: Positive for chills, fatigue and fever.   HENT: Negative for sore throat and trouble swallowing.    Respiratory: Positive for cough (chronic dry cough, unchanged). Negative for shortness of breath.    Cardiovascular: Negative for chest pain and palpitations.   Gastrointestinal: Positive for abdominal pain (chronic since surgery, unchanged), nausea and vomiting.   Genitourinary: Negative for dysuria and hematuria.   Musculoskeletal: Positive for arthralgias and myalgias.   Neurological: Negative for dizziness and seizures.   Hematological: Negative for adenopathy.   Psychiatric/Behavioral: Negative for confusion.     Objective:     Vital Signs (Most Recent):  Temp: 99.2 °F (37.3 °C) (02/13/18 0412)  Pulse: 88 (02/13/18 0412)  Resp: 18 (02/13/18 0412)  BP: 129/72 (02/13/18 0412)  SpO2: 98 % (02/13/18 0412) Vital Signs (24h Range):  Temp:  [99.2 °F (37.3 °C)-99.4  °F (37.4 °C)] 99.2 °F (37.3 °C)  Pulse:  [80-99] 88  Resp:  [17-20] 18  SpO2:  [90 %-100 %] 98 %  BP: (129-149)/(71-93) 129/72     Weight: 54 kg (119 lb 0.8 oz)  Body mass index is 21.09 kg/m².  Body surface area is 1.55 meters squared.    No intake or output data in the 24 hours ending 02/13/18 0745    Physical Exam   Constitutional: She is oriented to person, place, and time. She appears well-developed and well-nourished. No distress.   HENT:   Head: Normocephalic and atraumatic.   Right Ear: External ear normal.   Left Ear: External ear normal.   Eyes: EOM are normal. Pupils are equal, round, and reactive to light. Right eye exhibits no discharge. Left eye exhibits no discharge.   Neck: Normal range of motion. Neck supple. No tracheal deviation present.   Cardiovascular: Normal rate and regular rhythm.    No murmur heard.  Pulmonary/Chest: Effort normal and breath sounds normal. No stridor. No respiratory distress. She has no wheezes.   Abdominal: Soft. Bowel sounds are normal. There is tenderness. There is no guarding.   +surgical scar   Musculoskeletal: Normal range of motion. She exhibits no tenderness.   Neurological: She is alert and oriented to person, place, and time.   Skin: Skin is warm and dry. She is not diaphoretic.   Psychiatric: She has a normal mood and affect. Her behavior is normal.       Significant Labs:   CBC:   Recent Labs  Lab 02/12/18  0740 02/12/18 1738   WBC 2.00*  2.00* 2.03*   HGB 11.4*  11.4* 10.1*   HCT 34.9*  34.9* 30.0*     195 171    and CMP:   Recent Labs  Lab 02/12/18  0740 02/12/18  1738 02/13/18  0513    135* 135*   K 3.1* 2.7* 3.3*   CL 99 97 101   CO2 25 25 22*   GLU 94 79 80   BUN 7 8 9   CREATININE 0.9 1.0 0.8   CALCIUM 8.5* 8.0* 7.8*   PROT 6.8 6.3 5.7*   ALBUMIN 3.4* 2.9* 2.6*   BILITOT 1.4* 1.5* 1.6*   ALKPHOS 458* 456* 385*   AST 25 37 21   ALT 22 24 19   ANIONGAP 13 13 12   EGFRNONAA >60 >60.0 >60.0       Diagnostic Results:  I have reviewed all  pertinent imaging results/findings within the past 24 hours.    CXR 2/12/18:  The cardiomediastinal silhouette is not enlarged.  There is no pleural effusion.  The trachea is midline.  The lungs are symmetrically expanded bilaterally without evidence of acute parenchymal process. No large focal consolidation seen.  There is no pneumothorax.  The osseous structures are remarkable for mild degenerative change.    Assessment/Plan:     Neutropenia    - WBC 2.0 ,  on 2/12/18  - ANC today pending  - afebrile here (Tm 100.6 at home) but liver transplant concerned for infection  - neutropenia likely related to cellcept, valcyte, and possible underlying infection  - reasonable to start Neupogen daily until counts recover      Thank you for allowing us to participate in the care of this patient.   Please do not hesitate to call with any questions.    Jeovany Corona MD (PGY-5)  Hematology/Oncology Fellow  Will discuss with Dr. Johnson (Hematology/Oncology Staff)    Attending Addendum:  The patient was seen, examined, and discussed on rounds with the team.  I agree with the assessment and plan as outlined for Marcie Bazan.  Total white blood cell count has improved but we are awaiting an absolute neutrophil count.  Clinically she appears well.  Condition continue Neupogen once absolute neutrophil count is over 1500.    Pola Johnson DO, FACP  Hematology & Oncology  [facility] [place] [address]  ph. [phone]  Fax. [phone]

## 2018-02-13 NOTE — PLAN OF CARE
Problem: Fall Risk (Adult)  Goal: Absence of Falls  Patient will demonstrate the desired outcomes by discharge/transition of care.   Outcome: Ongoing (interventions implemented as appropriate)  Fall precautions maintained. Bed wheels locked, bed in lowest position, upper SR up x 2, call light in reach, non-skid socks when OOB. Instructed pt to call for assistance as needed.     Problem: Patient Care Overview  Goal: Plan of Care Review  Outcome: Ongoing (interventions implemented as appropriate)  No acute events overnight. Pt refused IVPB K+ d/t IV discomfort with other meds, took previously scheduled K+ pills for replacement. Mg rider x 1. VSS. Pan cx pending. Will cont to monitor.     Problem: Infection, Risk/Actual (Adult)  Goal: Infection Prevention/Resolution  Patient will demonstrate the desired outcomes by discharge/transition of care.  Outcome: Ongoing (interventions implemented as appropriate)  TMax 99.3 F. Granix given this shift. Discussed importance of hand hygiene with pt and son - verbalized understanding.      Problem: Nausea/Vomiting (Adult)  Goal: Symptom Relief  Patient will demonstrate the desired outcomes by discharge/transition of care.  Outcome: Ongoing (interventions implemented as appropriate)  C/o nausea, no emesis. Relief after PRN IVP zofran.     Problem: Pain, Acute (Adult)  Goal: Acceptable Pain Control/Comfort Level  Patient will demonstrate the desired outcomes by discharge/transition of care.  Outcome: Ongoing (interventions implemented as appropriate)  Generalized pain improved after oxycodone PO PRN. Pt c/o pain this am 4/10 on pain scale - pain med offered but refused.

## 2018-02-13 NOTE — ASSESSMENT & PLAN NOTE
- with significantly low WBC and ANC as outpatient.   - Neupogen on admit and ANC with improvement today to 2000.   - hold off on Neupogen today.  - infectious workup neg so far.

## 2018-02-13 NOTE — SUBJECTIVE & OBJECTIVE
Scheduled Meds:   aspirin  81 mg Oral Daily    ceFEPime (MAXIPIME) IVPB  2 g Intravenous Q8H    famotidine  20 mg Oral QHS    heparin (porcine)  5,000 Units Subcutaneous Q8H    NIFEdipine  60 mg Oral Daily    tacrolimus  4 mg Oral BID    traZODone  25 mg Oral QHS     Continuous Infusions:  PRN Meds:acetaminophen, bisacodyl, diphenhydrAMINE, ondansetron, oxyCODONE, promethazine (PHENERGAN) IVPB, ramelteon, sodium chloride 0.9%    Review of Systems   Constitutional: Positive for fatigue. Negative for activity change, appetite change and fever.   HENT: Negative.  Negative for congestion and facial swelling.    Eyes: Negative.    Respiratory: Negative.  Negative for chest tightness, shortness of breath and wheezing.    Cardiovascular: Negative.  Negative for chest pain, palpitations and leg swelling.   Gastrointestinal: Negative.  Negative for abdominal distention, abdominal pain, constipation, diarrhea, nausea and vomiting.   Genitourinary: Negative.  Negative for decreased urine volume, difficulty urinating, dysuria, hematuria and urgency.   Musculoskeletal: Negative.  Negative for back pain, neck pain and neck stiffness.   Skin: Negative.  Negative for color change, pallor and rash.   Neurological: Negative.  Negative for dizziness, seizures, speech difficulty, weakness, light-headedness and headaches.   Psychiatric/Behavioral: Negative.  Negative for behavioral problems, hallucinations and suicidal ideas. The patient is not nervous/anxious.      Objective:     Vital Signs (Most Recent):  Temp: 97.6 °F (36.4 °C) (02/13/18 1151)  Pulse: 90 (02/13/18 1151)  Resp: 14 (02/13/18 1151)  BP: 129/67 (02/13/18 1151)  SpO2: 100 % (02/13/18 1151) Vital Signs (24h Range):  Temp:  [97.6 °F (36.4 °C)-99.4 °F (37.4 °C)] 97.6 °F (36.4 °C)  Pulse:  [80-99] 90  Resp:  [14-20] 14  SpO2:  [90 %-100 %] 100 %  BP: (129-153)/(67-93) 129/67     Weight: 54 kg (119 lb 0.8 oz)  Body mass index is 21.09 kg/m².    Intake/Output - Last 3  Shifts       02/11 0700 - 02/12 0659 02/12 0700 - 02/13 0659 02/13 0700 - 02/14 0659    P.O.   360    Total Intake(mL/kg)   360 (6.7)    Net     +360                 Physical Exam   Constitutional: She is oriented to person, place, and time. She appears well-developed and well-nourished. No distress.   HENT:   Head: Normocephalic and atraumatic.   Neck: Normal range of motion. Neck supple.   Cardiovascular: Normal rate, regular rhythm and normal heart sounds.    No murmur heard.  Pulmonary/Chest: Effort normal and breath sounds normal. No respiratory distress. She has no wheezes. She exhibits no tenderness.   Abdominal: Soft. Bowel sounds are normal. She exhibits no distension. There is no tenderness.   Musculoskeletal: Normal range of motion. She exhibits no edema or tenderness.   Neurological: She is alert and oriented to person, place, and time.   Skin: Skin is warm and dry. She is not diaphoretic.   Psychiatric: She has a normal mood and affect. Her behavior is normal. Judgment and thought content normal.   Nursing note and vitals reviewed.      Laboratory:  Immunosuppressants         Stop Route Frequency     tacrolimus capsule 4 mg      -- Oral 2 times daily        CBC:   Recent Labs  Lab 02/13/18  0513   WBC 4.07   RBC 3.46*   HGB 9.7*   HCT 29.5*      MCV 85   MCH 28.0   MCHC 32.9     CMP:   Recent Labs  Lab 02/13/18  0513   GLU 80   CALCIUM 7.8*   ALBUMIN 2.6*   PROT 5.7*   *   K 3.3*   CO2 22*      BUN 9   CREATININE 0.8   ALKPHOS 385*   ALT 19   AST 21   BILITOT 1.6*     Coagulation:   Recent Labs  Lab 02/12/18  1738   INR 1.1   APTT 29.7     Labs within the past 24 hours have been reviewed.    Diagnostic Results:  None

## 2018-02-13 NOTE — HOSPITAL COURSE
Interval history: no acute events overnight. Reports fatigue this am but otherwise feeling well. WBC and ANC within normal limits. Neupogen given on admit due to ANC <200.  Infectious workup unremarkable thus far. Possible d/c in am if remains afebrile off antibiotics. Monitor.

## 2018-02-13 NOTE — PROGRESS NOTES
[facility]  Liver Transplant  Progress Note    Patient Name: Marcie Bazan  MRN: [id number]  Admission Date: 2018  Hospital Length of Stay: 0 days  Code Status: Full Code  Primary Care Provider: ODILIA Wall  Post-Operative Day: 49    ORGAN:   LIVER  Disease Etiology: Alcoholic Cirrhosis  Donor Type:    - Brain Death  CDC High Risk:   No  Donor CMV Status:   Donor CMV Status: Negative  Donor HBcAB:   Negative  Donor HCV Status:   Negative  Whole or Partial: Whole Liver  Biliary Anastomosis: End to End  Arterial Anatomy: Standard  Subjective:     History of Present Illness:  Marcie Bazan is a 52 y.o. 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 The patient comes to the ED with a week of decreasing ANC to 0.2 today. She states that she has felt weak, has not been able to eat well due to vomiting, and has body aches. The patient had subjected fevers at home but no documented fevers while in the Emergency Department. She started to have a down trend in her ANC on  and had her valcyte and cellcept held at that time. She continues to take tacrolimus. The patient appears well at this time but weak. She has no other complaints.     Hospital Course:  Interval history: no acute events overnight. Reports fatigue this am but otherwise feeling well. WBC low and Neupogen given on admit. ANC 2000 today. Hold off on further Neupogen. Monitor.     Scheduled Meds:   aspirin  81 mg Oral Daily    ceFEPime (MAXIPIME) IVPB  2 g Intravenous Q8H    famotidine  20 mg Oral QHS    heparin (porcine)  5,000 Units Subcutaneous Q8H    NIFEdipine  60 mg Oral Daily    tacrolimus  4 mg Oral BID    traZODone  25 mg Oral QHS     Continuous Infusions:  PRN Meds:acetaminophen, bisacodyl, diphenhydrAMINE, ondansetron, oxyCODONE, promethazine (PHENERGAN) IVPB, ramelteon, sodium chloride 0.9%    Review of Systems   Constitutional: Positive for fatigue.  Negative for activity change, appetite change and fever.   HENT: Negative.  Negative for congestion and facial swelling.    Eyes: Negative.    Respiratory: Negative.  Negative for chest tightness, shortness of breath and wheezing.    Cardiovascular: Negative.  Negative for chest pain, palpitations and leg swelling.   Gastrointestinal: Negative.  Negative for abdominal distention, abdominal pain, constipation, diarrhea, nausea and vomiting.   Genitourinary: Negative.  Negative for decreased urine volume, difficulty urinating, dysuria, hematuria and urgency.   Musculoskeletal: Negative.  Negative for back pain, neck pain and neck stiffness.   Skin: Negative.  Negative for color change, pallor and rash.   Neurological: Negative.  Negative for dizziness, seizures, speech difficulty, weakness, light-headedness and headaches.   Psychiatric/Behavioral: Negative.  Negative for behavioral problems, hallucinations and suicidal ideas. The patient is not nervous/anxious.      Objective:     Vital Signs (Most Recent):  Temp: 97.6 °F (36.4 °C) (02/13/18 1151)  Pulse: 90 (02/13/18 1151)  Resp: 14 (02/13/18 1151)  BP: 129/67 (02/13/18 1151)  SpO2: 100 % (02/13/18 1151) Vital Signs (24h Range):  Temp:  [97.6 °F (36.4 °C)-99.4 °F (37.4 °C)] 97.6 °F (36.4 °C)  Pulse:  [80-99] 90  Resp:  [14-20] 14  SpO2:  [90 %-100 %] 100 %  BP: (129-153)/(67-93) 129/67     Weight: 54 kg (119 lb 0.8 oz)  Body mass index is 21.09 kg/m².    Intake/Output - Last 3 Shifts       02/11 0700 - 02/12 0659 02/12 0700 - 02/13 0659 02/13 0700 - 02/14 0659    P.O.   360    Total Intake(mL/kg)   360 (6.7)    Net     +360                 Physical Exam   Constitutional: She is oriented to person, place, and time. She appears well-developed and well-nourished. No distress.   HENT:   Head: Normocephalic and atraumatic.   Neck: Normal range of motion. Neck supple.   Cardiovascular: Normal rate, regular rhythm and normal heart sounds.    No murmur  heard.  Pulmonary/Chest: Effort normal and breath sounds normal. No respiratory distress. She has no wheezes. She exhibits no tenderness.   Abdominal: Soft. Bowel sounds are normal. She exhibits no distension. There is no tenderness.   Musculoskeletal: Normal range of motion. She exhibits no edema or tenderness.   Neurological: She is alert and oriented to person, place, and time.   Skin: Skin is warm and dry. She is not diaphoretic.   Psychiatric: She has a normal mood and affect. Her behavior is normal. Judgment and thought content normal.   Nursing note and vitals reviewed.      Laboratory:  Immunosuppressants         Stop Route Frequency     tacrolimus capsule 4 mg      -- Oral 2 times daily        CBC:   Recent Labs  Lab 02/13/18  0513   WBC 4.07   RBC 3.46*   HGB 9.7*   HCT 29.5*      MCV 85   MCH 28.0   MCHC 32.9     CMP:   Recent Labs  Lab 02/13/18 0513   GLU 80   CALCIUM 7.8*   ALBUMIN 2.6*   PROT 5.7*   *   K 3.3*   CO2 22*      BUN 9   CREATININE 0.8   ALKPHOS 385*   ALT 19   AST 21   BILITOT 1.6*     Coagulation:   Recent Labs  Lab 02/12/18  1738   INR 1.1   APTT 29.7     Labs within the past 24 hours have been reviewed.    Diagnostic Results:  None    Assessment/Plan:     Neutropenia    - with significantly low WBC and ANC as outpatient.   - Neupogen on admit and ANC with improvement today to 2000.   - hold off on Neupogen today.  - infectious workup neg so far.           Liver transplant 12/26/2017 for ETOH    - LFTs stable.   - Good hepatic allograft function.           Prophylactic immunotherapy    Cont prograf. Draw prograf level daily and adjust dose as needed to maintain a therapeutic level.           Anemia of chronic disease    -H&H stable.             VTE Risk Mitigation         Ordered     heparin (porcine) injection 5,000 Units  Every 8 hours     Route:  Subcutaneous        02/12/18 1938     Medium Risk of VTE  Once      02/12/18 1938          The patients clinical status  was discussed at multidisplinary rounds, involving transplant surgery, transplant medicine, pharmacy, nursing, nutrition, and social work    Discharge Planning:  Not a candidate for d/c at this time.     Devon Robles NP  Liver Transplant  [facility]

## 2018-02-13 NOTE — ED NOTES
"Patient stating that she cannot take the K pills, that they are "too big and will make me throw up, I've done it before." Patient just taken off of 10 meq of K from PCP. Patient states "I have to separate them by 6 hours." Dr. Palacios notified and will speak to patient.      No flu swabs available in hospital. Patient placed on precautionary isolation (droplet) per Dr. Palacios.  "

## 2018-02-13 NOTE — ED NOTES
Patient took home dose of trazodone 25 mg and prograf 4 mg. Patient requesting to get IV potassium mixed with normal saline and pain meds. Patient has norco 4 mg in her home medicine bag. Instructed to hold off on pain meds at this time.

## 2018-02-13 NOTE — ED NOTES
Assumed care of patient after receiving report from GUIDO Butler. I acknowledge care for this patient. The patient is awake, alert, and cooperating with a calm affect. RR spontaneous, even, and unlabored, with regular effort and rate. Patient is in NAD, and resting calmly on stretcher with blood pressure cuff, pulse ox, and cardiac monitor attached. Bed locked in low position with side rails up x2 for safety, and call bell within reach. Patient is aware of POC, and has no complaints or concerns at this time. Will continue to monitor.

## 2018-02-13 NOTE — ASSESSMENT & PLAN NOTE
Cont prograf. Draw prograf level daily and adjust dose as needed to maintain a therapeutic level.

## 2018-02-13 NOTE — H&P
History & Physical   Liver Transplant Surgery      SUBJECTIVE:     Chief Complaint/Reason for Admission: Weakness, Subjective Fevers, Body Aches    History of Present Illness: Marcie aBzan is a 52 y.o. 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 The patient comes to the ED with a week of decreasing ANC to 0.2 today. She states that she has felt weak, has not been able to eat well due to vomiting, and has body aches. The patient had subjected fevers at home but no documented fevers while in the Emergency Department. She started to have a down trend in her ANC on 2/6 and had her valcyte and cellcept held at that time. She continues to take tacrolimus. The patient appears well at this time but weak. She has no other complaints.     Review of patient's allergies indicates:   Allergen Reactions    Ferrous sulfate Other (See Comments)     Patient states the pill makes her sick. She stated she would rather have a shot       Past Medical History:   Diagnosis Date    Alcohol abuse     Alcoholic hepatitis     Anemia of chronic disease     Coagulopathy     End stage liver disease     Hypertension     Hyponatremia     Liver cirrhosis     Liver transplant candidate     Obesity (BMI 30-39.9) 1/5/2016     Past Surgical History:   Procedure Laterality Date    BREAST BIOPSY      CHOLECYSTECTOMY      COLONOSCOPY N/A 12/1/2017    Procedure: COLONOSCOPY;  Surgeon: Filemon Fuentes MD;  Location: [address]);  Service: Endoscopy;  Laterality: N/A;    COLONOSCOPY N/A 12/5/2017    Procedure: COLONOSCOPY;  Surgeon: José Chavira MD;  Location: [address]);  Service: Endoscopy;  Laterality: N/A;    HYSTERECTOMY      LIVER TRANSPLANT  12/2017     Family History   Problem Relation Age of Onset    Hypertension Mother     Hypertension Father     Diabetes Father     Diabetes Sister     Depression Maternal Grandfather     Breast cancer Paternal Aunt      Social  History   Substance Use Topics    Smoking status: Never Smoker    Smokeless tobacco: Never Used    Alcohol use No      Comment: Currently admitted to inpatient rehab        Review of Systems - + body aches, + fever, + weakness, + fatigue. Negative for all other review of systems.     OBJECTIVE:     Vital Signs (Most Recent)  Temp: 99.4 °F (37.4 °C) (02/12/18 1557)  Pulse: 86 (02/12/18 1801)  Resp: 20 (02/12/18 1557)  BP: 133/77 (02/12/18 1801)  SpO2: 99 % (02/12/18 1801)    Physical Exam   General: NAD  HEENT: Normocephalic  Pulmonary: CTAB  Cardiovascular: RRR; no m/r/g  Abdomen: Soft, nontender, nondistended, incision well healed  Extremities: Full range of motion in all extremities  Skin: Warm and well perfused  Neuro: No focal deficits     Laboratory  CBC:   Recent Labs  Lab 02/12/18  1738   WBC 2.03*   RBC 3.50*   HGB 10.1*   HCT 30.0*      MCV 86   MCH 28.9   MCHC 33.7     CMP:   Recent Labs  Lab 02/12/18  1738   GLU 79   CALCIUM 8.0*   ALBUMIN 2.9*   PROT 6.3   *   K 2.7*   CO2 25   CL 97   BUN 8   CREATININE 1.0   ALKPHOS 456*   ALT 24   AST 37   BILITOT 1.5*     Coagulation:   Recent Labs  Lab 02/12/18  1738   LABPROT 11.3   INR 1.1   APTT 29.7       Diagnostic Results:  CXR - no acute cardiopulmonary process     ASSESSMENT/PLAN:     51 yo F s/p Liver Transplant for alcoholic cirrhosis on 12/26/2017  -Will admit to TSU  -Will continue antibiotics - cultures drawn in Emergency department  -Discussed with hematology and will administer Neupogen tonight for ANC of 0.2  -Patient can continue to have a diet and Boost supplements  -Will trend labs daily   -Continue to monitor temperature curve

## 2018-02-13 NOTE — PLAN OF CARE
Problem: Patient Care Overview  Goal: Plan of Care Review  Outcome: Ongoing (interventions implemented as appropriate)  Patient aaox4. Bed kept locked, lowest position with 2x side rails up while in bed. nonslip footwear when out of bed. Ambulates independently. Call bell and personal items within reach. Son at bedside. Regular diet with boost. Patient reports nausea and 1x emesis, given prn medications with mild relief. Complaint of pain moderately controlled with prn medications. right FA PIV cdi. Continued IV antibiotics. Potassium 3.3 with morning labs, given 40mEq PO.  Patient was given neupogen last night, wbc 4.07 this morning, plan for another dose tomorrow. Standard precautions maintained.

## 2018-02-14 PROBLEM — Z79.60 LONG-TERM USE OF IMMUNOSUPPRESSANT MEDICATION: Status: ACTIVE | Noted: 2018-02-14

## 2018-02-14 LAB
ALBUMIN SERPL BCP-MCNC: 2.6 G/DL
ALP SERPL-CCNC: 312 U/L
ALT SERPL W/O P-5'-P-CCNC: 19 U/L
ANION GAP SERPL CALC-SCNC: 10 MMOL/L
ANISOCYTOSIS BLD QL SMEAR: SLIGHT
AST SERPL-CCNC: 18 U/L
BACTERIA #/AREA URNS AUTO: ABNORMAL /HPF
BASOPHILS # BLD AUTO: 0.08 K/UL
BASOPHILS NFR BLD: 0.9 %
BILIRUB SERPL-MCNC: 1.4 MG/DL
BILIRUB UR QL STRIP: ABNORMAL
BUN SERPL-MCNC: 8 MG/DL
BURR CELLS BLD QL SMEAR: ABNORMAL
CALCIUM SERPL-MCNC: 8.7 MG/DL
CHLORIDE SERPL-SCNC: 103 MMOL/L
CLARITY UR REFRACT.AUTO: ABNORMAL
CO2 SERPL-SCNC: 23 MMOL/L
COLOR UR AUTO: ABNORMAL
CREAT SERPL-MCNC: 0.7 MG/DL
DIFFERENTIAL METHOD: ABNORMAL
EOSINOPHIL # BLD AUTO: 0.2 K/UL
EOSINOPHIL NFR BLD: 2.7 %
ERYTHROCYTE [DISTWIDTH] IN BLOOD BY AUTOMATED COUNT: 15.5 %
EST. GFR  (AFRICAN AMERICAN): >60 ML/MIN/1.73 M^2
EST. GFR  (NON AFRICAN AMERICAN): >60 ML/MIN/1.73 M^2
GLUCOSE SERPL-MCNC: 74 MG/DL
GLUCOSE UR QL STRIP: NEGATIVE
HCT VFR BLD AUTO: 30 %
HGB BLD-MCNC: 10 G/DL
HGB UR QL STRIP: NEGATIVE
HYALINE CASTS UR QL AUTO: 4 /LPF
HYPOCHROMIA BLD QL SMEAR: ABNORMAL
IMM GRANULOCYTES # BLD AUTO: 0.05 K/UL
IMM GRANULOCYTES NFR BLD AUTO: 0.6 %
KETONES UR QL STRIP: ABNORMAL
LEUKOCYTE ESTERASE UR QL STRIP: NEGATIVE
LYMPHOCYTES # BLD AUTO: 1.3 K/UL
LYMPHOCYTES NFR BLD: 14.8 %
MAGNESIUM SERPL-MCNC: 1.3 MG/DL
MCH RBC QN AUTO: 28.9 PG
MCHC RBC AUTO-ENTMCNC: 33.3 G/DL
MCV RBC AUTO: 87 FL
MICROSCOPIC COMMENT: ABNORMAL
MONOCYTES # BLD AUTO: 1.4 K/UL
MONOCYTES NFR BLD: 15.8 %
NEUTROPHILS # BLD AUTO: 5.7 K/UL
NEUTROPHILS NFR BLD: 65.2 %
NITRITE UR QL STRIP: NEGATIVE
NRBC BLD-RTO: 0 /100 WBC
OVALOCYTES BLD QL SMEAR: ABNORMAL
PH UR STRIP: 6 [PH] (ref 5–8)
PLATELET # BLD AUTO: 209 K/UL
PMV BLD AUTO: 11.1 FL
POIKILOCYTOSIS BLD QL SMEAR: SLIGHT
POLYCHROMASIA BLD QL SMEAR: ABNORMAL
POTASSIUM SERPL-SCNC: 3.5 MMOL/L
PROT SERPL-MCNC: 5.8 G/DL
PROT UR QL STRIP: ABNORMAL
RBC # BLD AUTO: 3.46 M/UL
RBC #/AREA URNS AUTO: 0 /HPF (ref 0–4)
SCHISTOCYTES BLD QL SMEAR: ABNORMAL
SODIUM SERPL-SCNC: 136 MMOL/L
SP GR UR STRIP: 1.02 (ref 1–1.03)
SQUAMOUS #/AREA URNS AUTO: 7 /HPF
TACROLIMUS BLD-MCNC: 9.4 NG/ML
URN SPEC COLLECT METH UR: ABNORMAL
UROBILINOGEN UR STRIP-ACNC: 2 EU/DL
WBC # BLD AUTO: 8.73 K/UL
WBC #/AREA URNS AUTO: 2 /HPF (ref 0–5)

## 2018-02-14 PROCEDURE — 94640 AIRWAY INHALATION TREATMENT: CPT

## 2018-02-14 PROCEDURE — 94761 N-INVAS EAR/PLS OXIMETRY MLT: CPT

## 2018-02-14 PROCEDURE — 63600175 PHARM REV CODE 636 W HCPCS: Performed by: NURSE PRACTITIONER

## 2018-02-14 PROCEDURE — 20600001 HC STEP DOWN PRIVATE ROOM

## 2018-02-14 PROCEDURE — 25000003 PHARM REV CODE 250: Performed by: NURSE PRACTITIONER

## 2018-02-14 PROCEDURE — 85025 COMPLETE CBC W/AUTO DIFF WBC: CPT

## 2018-02-14 PROCEDURE — 25000003 PHARM REV CODE 250: Performed by: STUDENT IN AN ORGANIZED HEALTH CARE EDUCATION/TRAINING PROGRAM

## 2018-02-14 PROCEDURE — 80197 ASSAY OF TACROLIMUS: CPT

## 2018-02-14 PROCEDURE — 63600175 PHARM REV CODE 636 W HCPCS: Performed by: STUDENT IN AN ORGANIZED HEALTH CARE EDUCATION/TRAINING PROGRAM

## 2018-02-14 PROCEDURE — 87086 URINE CULTURE/COLONY COUNT: CPT

## 2018-02-14 PROCEDURE — 80053 COMPREHEN METABOLIC PANEL: CPT

## 2018-02-14 PROCEDURE — 36415 COLL VENOUS BLD VENIPUNCTURE: CPT

## 2018-02-14 PROCEDURE — 83735 ASSAY OF MAGNESIUM: CPT

## 2018-02-14 PROCEDURE — 99233 SBSQ HOSP IP/OBS HIGH 50: CPT | Mod: 24,,, | Performed by: NURSE PRACTITIONER

## 2018-02-14 PROCEDURE — 81001 URINALYSIS AUTO W/SCOPE: CPT

## 2018-02-14 RX ORDER — PENTAMIDINE ISETHIONATE 300 MG/300MG
300 INHALANT RESPIRATORY (INHALATION) ONCE
Status: COMPLETED | OUTPATIENT
Start: 2018-02-14 | End: 2018-02-14

## 2018-02-14 RX ORDER — ALBUTEROL SULFATE 0.83 MG/ML
2.5 SOLUTION RESPIRATORY (INHALATION) ONCE AS NEEDED
Status: ACTIVE | OUTPATIENT
Start: 2018-02-14 | End: 2018-02-14

## 2018-02-14 RX ORDER — VALGANCICLOVIR 450 MG/1
450 TABLET, FILM COATED ORAL DAILY
Status: DISCONTINUED | OUTPATIENT
Start: 2018-02-14 | End: 2018-02-15 | Stop reason: HOSPADM

## 2018-02-14 RX ADMIN — TACROLIMUS 4 MG: 1 CAPSULE ORAL at 09:02

## 2018-02-14 RX ADMIN — HEPARIN SODIUM 5000 UNITS: 5000 INJECTION, SOLUTION INTRAVENOUS; SUBCUTANEOUS at 08:02

## 2018-02-14 RX ADMIN — TRAZODONE HYDROCHLORIDE 25 MG: 50 TABLET ORAL at 08:02

## 2018-02-14 RX ADMIN — NIFEDIPINE 60 MG: 60 TABLET, FILM COATED, EXTENDED RELEASE ORAL at 09:02

## 2018-02-14 RX ADMIN — ONDANSETRON 4 MG: 2 INJECTION INTRAMUSCULAR; INTRAVENOUS at 09:02

## 2018-02-14 RX ADMIN — VALGANCICLOVIR 450 MG: 450 TABLET, FILM COATED ORAL at 01:02

## 2018-02-14 RX ADMIN — OXYCODONE HYDROCHLORIDE 5 MG: 5 TABLET ORAL at 08:02

## 2018-02-14 RX ADMIN — TACROLIMUS 4 MG: 1 CAPSULE ORAL at 05:02

## 2018-02-14 RX ADMIN — HEPARIN SODIUM 5000 UNITS: 5000 INJECTION, SOLUTION INTRAVENOUS; SUBCUTANEOUS at 04:02

## 2018-02-14 RX ADMIN — ASPIRIN 81 MG: 81 TABLET, COATED ORAL at 09:02

## 2018-02-14 RX ADMIN — PENTAMIDINE ISETHIONATE 300 MG: 300 INHALANT RESPIRATORY (INHALATION) at 12:02

## 2018-02-14 RX ADMIN — HEPARIN SODIUM 5000 UNITS: 5000 INJECTION, SOLUTION INTRAVENOUS; SUBCUTANEOUS at 01:02

## 2018-02-14 RX ADMIN — CEFEPIME 2 G: 2 INJECTION, POWDER, FOR SOLUTION INTRAVENOUS at 03:02

## 2018-02-14 RX ADMIN — FAMOTIDINE 20 MG: 20 TABLET, FILM COATED ORAL at 08:02

## 2018-02-14 NOTE — SUBJECTIVE & OBJECTIVE
Scheduled Meds:   aspirin  81 mg Oral Daily    famotidine  20 mg Oral QHS    heparin (porcine)  5,000 Units Subcutaneous Q8H    NIFEdipine  60 mg Oral Daily    tacrolimus  4 mg Oral BID    traZODone  25 mg Oral QHS    valGANciclovir  450 mg Oral Daily     Continuous Infusions:  PRN Meds:acetaminophen, albuterol sulfate, bisacodyl, diphenhydrAMINE, ondansetron, oxyCODONE, promethazine (PHENERGAN) IVPB, ramelteon, sodium chloride 0.9%    Review of Systems   Constitutional: Positive for fatigue. Negative for activity change, appetite change and fever.   HENT: Negative.  Negative for congestion and facial swelling.    Eyes: Negative.    Respiratory: Negative.  Negative for chest tightness, shortness of breath and wheezing.    Cardiovascular: Negative.  Negative for chest pain, palpitations and leg swelling.   Gastrointestinal: Negative.  Negative for abdominal distention, abdominal pain, constipation, diarrhea, nausea and vomiting.   Genitourinary: Negative.  Negative for decreased urine volume, difficulty urinating, dysuria, hematuria and urgency.   Musculoskeletal: Negative.  Negative for back pain, neck pain and neck stiffness.   Skin: Negative.  Negative for color change, pallor and rash.   Neurological: Negative.  Negative for dizziness, seizures, speech difficulty, weakness, light-headedness and headaches.   Psychiatric/Behavioral: Negative.  Negative for behavioral problems, hallucinations and suicidal ideas. The patient is not nervous/anxious.      Objective:     Vital Signs (Most Recent):  Temp: 98.8 °F (37.1 °C) (02/14/18 1139)  Pulse: 94 (02/14/18 1239)  Resp: 14 (02/14/18 1239)  BP: (!) 156/79 (02/14/18 1139)  SpO2: 100 % (02/14/18 1239) Vital Signs (24h Range):  Temp:  [98.7 °F (37.1 °C)-99.1 °F (37.3 °C)] 98.8 °F (37.1 °C)  Pulse:  [] 94  Resp:  [14-20] 14  SpO2:  [95 %-100 %] 100 %  BP: (115-156)/(59-79) 156/79     Weight: 57.3 kg (126 lb 5.2 oz)  Body mass index is 22.38  kg/m².    Intake/Output - Last 3 Shifts       02/12 0700 - 02/13 0659 02/13 0700 - 02/14 0659 02/14 0700 - 02/15 0659    P.O.  720 840    IV Piggyback  50     Total Intake(mL/kg)  770 (13.4) 840 (14.7)    Urine (mL/kg/hr)   50 (0.1)    Stool   0 (0)    Total Output     50    Net   +770 +790           Stool Occurrence   1 x    Emesis Occurrence  1 x           Physical Exam   Constitutional: She is oriented to person, place, and time. She appears well-developed and well-nourished. No distress.   HENT:   Head: Normocephalic and atraumatic.   Eyes: Pupils are equal, round, and reactive to light.   Neck: Normal range of motion. Neck supple.   Cardiovascular: Normal rate, regular rhythm, normal heart sounds and intact distal pulses.    No murmur heard.  Pulmonary/Chest: Effort normal and breath sounds normal. No respiratory distress. She has no wheezes. She exhibits no tenderness.   Abdominal: Soft. Bowel sounds are normal. She exhibits no distension. There is no tenderness.   Musculoskeletal: Normal range of motion. She exhibits no edema or tenderness.   Neurological: She is alert and oriented to person, place, and time.   Skin: Skin is warm and dry. She is not diaphoretic.   Psychiatric: She has a normal mood and affect. Her behavior is normal. Judgment and thought content normal.   Nursing note and vitals reviewed.      Laboratory:  Immunosuppressants         Stop Route Frequency     tacrolimus capsule 4 mg      -- Oral 2 times daily        CBC:     Recent Labs  Lab 02/14/18  0551   WBC 8.73   RBC 3.46*   HGB 10.0*   HCT 30.0*      MCV 87   MCH 28.9   MCHC 33.3     CMP:     Recent Labs  Lab 02/14/18  0551   GLU 74   CALCIUM 8.7   ALBUMIN 2.6*   PROT 5.8*      K 3.5   CO2 23      BUN 8   CREATININE 0.7   ALKPHOS 312*   ALT 19   AST 18   BILITOT 1.4*     Coagulation:     Recent Labs  Lab 02/12/18  1738   INR 1.1   APTT 29.7     Labs within the past 24 hours have been reviewed.    Diagnostic  Results:  None

## 2018-02-14 NOTE — ASSESSMENT & PLAN NOTE
- hold bactrim as neutropenic, pentamidine 2/14  - valcyte prophylactic dose x 3 months, as hx of cmv 1 mth ago

## 2018-02-14 NOTE — PROGRESS NOTES
[facility]  Liver Transplant  Progress Note    Patient Name: Marcie Bazan  MRN: [id number]  Admission Date: 2018  Hospital Length of Stay: 0 days  Code Status: Full Code  Primary Care Provider: ODILIA Wall  Post-Operative Day: 50    ORGAN:   LIVER  Disease Etiology: Alcoholic Cirrhosis  Donor Type:    - Brain Death  CDC High Risk:   No  Donor CMV Status:   Donor CMV Status: Negative  Donor HBcAB:   Negative  Donor HCV Status:   Negative  Whole or Partial: Whole Liver  Biliary Anastomosis: End to End  Arterial Anatomy: Standard  Subjective:     History of Present Illness:  Marcie Bazan is a 52 y.o. 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 The patient comes to the ED with a week of decreasing ANC to 0.2 today. She states that she has felt weak, has not been able to eat well due to vomiting, and has body aches. The patient had subjected fevers at home but no documented fevers while in the Emergency Department. She started to have a down trend in her ANC on  and had her valcyte and cellcept held at that time. She continues to take tacrolimus. The patient appears well at this time but weak. She has no other complaints.     Hospital Course:  Interval history: no acute events overnight. Reports fatigue this am but otherwise feeling well. WBC and ANC within normal limits. Neupogen given on admit due to ANC <200.  Infectious workup unremarkable thus far. Possible d/c in am if remains afebrile off antibiotics. Monitor.     Scheduled Meds:   aspirin  81 mg Oral Daily    famotidine  20 mg Oral QHS    heparin (porcine)  5,000 Units Subcutaneous Q8H    NIFEdipine  60 mg Oral Daily    tacrolimus  4 mg Oral BID    traZODone  25 mg Oral QHS    valGANciclovir  450 mg Oral Daily     Continuous Infusions:  PRN Meds:acetaminophen, albuterol sulfate, bisacodyl, diphenhydrAMINE, ondansetron, oxyCODONE, promethazine (PHENERGAN)  IVPB, ramelteon, sodium chloride 0.9%    Review of Systems   Constitutional: Positive for fatigue. Negative for activity change, appetite change and fever.   HENT: Negative.  Negative for congestion and facial swelling.    Eyes: Negative.    Respiratory: Negative.  Negative for chest tightness, shortness of breath and wheezing.    Cardiovascular: Negative.  Negative for chest pain, palpitations and leg swelling.   Gastrointestinal: Negative.  Negative for abdominal distention, abdominal pain, constipation, diarrhea, nausea and vomiting.   Genitourinary: Negative.  Negative for decreased urine volume, difficulty urinating, dysuria, hematuria and urgency.   Musculoskeletal: Negative.  Negative for back pain, neck pain and neck stiffness.   Skin: Negative.  Negative for color change, pallor and rash.   Neurological: Negative.  Negative for dizziness, seizures, speech difficulty, weakness, light-headedness and headaches.   Psychiatric/Behavioral: Negative.  Negative for behavioral problems, hallucinations and suicidal ideas. The patient is not nervous/anxious.      Objective:     Vital Signs (Most Recent):  Temp: 98.8 °F (37.1 °C) (02/14/18 1139)  Pulse: 94 (02/14/18 1239)  Resp: 14 (02/14/18 1239)  BP: (!) 156/79 (02/14/18 1139)  SpO2: 100 % (02/14/18 1239) Vital Signs (24h Range):  Temp:  [98.7 °F (37.1 °C)-99.1 °F (37.3 °C)] 98.8 °F (37.1 °C)  Pulse:  [] 94  Resp:  [14-20] 14  SpO2:  [95 %-100 %] 100 %  BP: (115-156)/(59-79) 156/79     Weight: 57.3 kg (126 lb 5.2 oz)  Body mass index is 22.38 kg/m².    Intake/Output - Last 3 Shifts       02/12 0700 - 02/13 0659 02/13 0700 - 02/14 0659 02/14 0700 - 02/15 0659    P.O.  720 840    IV Piggyback  50     Total Intake(mL/kg)  770 (13.4) 840 (14.7)    Urine (mL/kg/hr)   50 (0.1)    Stool   0 (0)    Total Output     50    Net   +770 +790           Stool Occurrence   1 x    Emesis Occurrence  1 x           Physical Exam   Constitutional: She is oriented to person, place,  and time. She appears well-developed and well-nourished. No distress.   HENT:   Head: Normocephalic and atraumatic.   Eyes: Pupils are equal, round, and reactive to light.   Neck: Normal range of motion. Neck supple.   Cardiovascular: Normal rate, regular rhythm, normal heart sounds and intact distal pulses.    No murmur heard.  Pulmonary/Chest: Effort normal and breath sounds normal. No respiratory distress. She has no wheezes. She exhibits no tenderness.   Abdominal: Soft. Bowel sounds are normal. She exhibits no distension. There is no tenderness.   Musculoskeletal: Normal range of motion. She exhibits no edema or tenderness.   Neurological: She is alert and oriented to person, place, and time.   Skin: Skin is warm and dry. She is not diaphoretic.   Psychiatric: She has a normal mood and affect. Her behavior is normal. Judgment and thought content normal.   Nursing note and vitals reviewed.      Laboratory:  Immunosuppressants         Stop Route Frequency     tacrolimus capsule 4 mg      -- Oral 2 times daily        CBC:     Recent Labs  Lab 02/14/18  0551   WBC 8.73   RBC 3.46*   HGB 10.0*   HCT 30.0*      MCV 87   MCH 28.9   MCHC 33.3     CMP:     Recent Labs  Lab 02/14/18  0551   GLU 74   CALCIUM 8.7   ALBUMIN 2.6*   PROT 5.8*      K 3.5   CO2 23      BUN 8   CREATININE 0.7   ALKPHOS 312*   ALT 19   AST 18   BILITOT 1.4*     Coagulation:     Recent Labs  Lab 02/12/18  1738   INR 1.1   APTT 29.7     Labs within the past 24 hours have been reviewed.    Diagnostic Results:  None    Assessment/Plan:     Neutropenia    - with significantly low WBC and ANC as outpatient.   - Neupogen on admit and ANC with improvement   - hold off on Neupogen today.  - infectious workup neg so far.           Liver transplant 12/26/2017 for ETOH    - LFTs stable.   - Good hepatic allograft function.           Prophylactic immunotherapy    Cont prograf. Draw prograf level daily and adjust dose as needed to maintain a  therapeutic level.   - hold cellcept as with neutropenia          Long-term use of immunosuppressant medication    See prophylaxis          At risk for opportunistic infections    - hold bactrim as neutropenic, pentamidine 2/14  - valcyte prophylactic dose x 3 months, as hx of cmv 1 mth ago            Anemia of chronic disease    -H&H stable.             VTE Risk Mitigation         Ordered     heparin (porcine) injection 5,000 Units  Every 8 hours     Route:  Subcutaneous        02/12/18 1938     Medium Risk of VTE  Once      02/12/18 1938          The patients clinical status was discussed at multidisplinary rounds, involving transplant surgery, transplant medicine, pharmacy, nursing, nutrition, and social work    Discharge Planning: possible d/c in am   No Patient Care Coordination Note on file.      Irma Herrera NP  Liver Transplant  [facility]

## 2018-02-14 NOTE — PLAN OF CARE
Problem: Fall Risk (Adult)  Goal: Absence of Falls  Patient will demonstrate the desired outcomes by discharge/transition of care.   Outcome: Ongoing (interventions implemented as appropriate)  Fall precautions maintained. Bed wheels locked, bed in lowest position, upper SR up x 2, call light in reach, non-skid socks when OOB. Instructed pt to call for assistance as needed.      Problem: Patient Care Overview  Goal: Plan of Care Review  Outcome: Ongoing (interventions implemented as appropriate)  No acute events overnight. C/o severe muscle spasms with relief after flexeril x 1. VSS. Will cont to monitor.      Problem: Infection, Risk/Actual (Adult)  Goal: Infection Prevention/Resolution  Patient will demonstrate the desired outcomes by discharge/transition of care.  Outcome: Ongoing (interventions implemented as appropriate)  Afebrile. ANC improved. Possible neupogen injection today. Discussed importance of hand hygiene with pt and son - verbalized understanding.       Problem: Nausea/Vomiting (Adult)  Goal: Symptom Relief  Patient will demonstrate the desired outcomes by discharge/transition of care.  Outcome: Ongoing (interventions implemented as appropriate)  C/o nausea, dry heaving. Relief after PRN IVP zofran.      Problem: Pain, Acute (Adult)  Goal: Acceptable Pain Control/Comfort Level  Patient will demonstrate the desired outcomes by discharge/transition of care.  Outcome: Ongoing (interventions implemented as appropriate)  Generalized pain improved after oxycodone PO PRN.

## 2018-02-14 NOTE — PROGRESS NOTES
Admit Note     Met with patient to assess needs. Patient is a 52 y.o. single female, admitted for leukopenia and nausea.  Pt is s/p liver transplant from 12/26/2017. Pt reports that she returned home to [place], [place] a few weeks ago and is living between her daughter's home and her home located at [address] [address].      Patient admitted from home on 2/12/2018 .  At this time, patient presents as alert and oriented x 4, pleasant, good eye contact, well groomed, recall good, concentration/judgement good, average intelligence, calm, communicative, cooperative and asking and answering questions appropriately.  At this time, patients caregiver not present but here in [place] locally with patient.     Household/Family Systems     Patient resides with patient's daughter and three grandchildren, at [address] [address].  Support system includes pt's adult children Zaire and Reginaldo and siblings.  Patient does not have dependents that are need of being cared for.     Patients primary caregiver is Zaire Bazan, patients son, phone number [phone].  Confirmed patients contact information is [phone] (home);   Telephone Information:   Mobile [phone]       During admission, patient's caregiver plans to stay in patient's room.  Confirmed patient and patients caregivers do have access to reliable transportation.    Cognitive Status/Learning     Patient reports reading ability as college and states patient does have difficulty with vision and wears prescription glasses. Patient reports patient learns best by verbal information.   Needed: No.   Highest education level: Associate/Bachelor Degree    Vocation/Disability     Working for Income: No  If no, reason not working: Demands of Treatment  Patient is physically unable to work due to her medical issues and is in process of appealing her disability claim. Pt reports that she has a court date on  "Friday Feb 16th to appeal this claim. Pt reports that she will update SW team once she finds out the outcome of her disability.  counseled pt on contacting Medicaid to find out the cut off income as pt could lose her benefits if she makes "too much" by Medicaid's criteria. Pt expressed understanding and states she will inquire.     Adherence     Patient reports a high level of adherence to patients health care regimen.  Adherence counseling and education provided. Patient verbalizes understanding.    Substance Use    Patient reports the following substance usage.    Tobacco: none, patient denies any use.  Alcohol: none, patient denies any use. Pt denies any use since pt became acutely ill in October 2017. Pt did enroll temporarily in an inpatient program at [facility] located at [address] [place] [address] [address] [address] (ph#[phone]). Pt was unable to complete before transplant due to being too ill. Pt reports that once she has improved physical strength she will enroll in the outpatient program and reports that she has already been in touch with the . Pt states the program is 5-7 days a week and includes a women's group in the mornings along with groups for most of the day.   Illicit Drugs/Non-prescribed Medications: none, patient denies any use.  Patient states clear understanding of the potential impact of substance use.  Substance abstinence/cessation counseling, education and resources provided and reviewed.     Services Utilizing/ADLS    Infusion Service: Prior to admission, patient utilizing? no  Home Health: Prior to admission, patient utilizing? no; Pt enrolled with [facility] outpatient PT program at [facility] (ph#[phone]) and still has not been able to go to first appt due to this hospitalization but is in touch with coordinator Eloina.   DME: Prior to admission, yes; Pt has a rolling walker but does not use.   Pulmonary/Cardiac Rehab: Prior to admission, " no  Dialysis:  Prior to admission, no  Transplant Specialty Pharmacy:  Prior to admission, yes; [facility] [address] located at [address] [address].    Prior to admission, patient reports patient was independent with ADLS and was not driving.  Patient reports patient is able to care for self at this time..  Patient indicates a willingness to care for self once medically cleared to do so.    Insurance/Medications    Insured by   Payor/Plan Subscr  Sex Relation Sub. Ins. ID Effective Group Num   1. [facility]* GUADALUPE TOMLIN 1965 Female  [id number]                                    [address] [address]   2. MEDICAID - * GUADALUPE TOMLIN 1965 Female  [id number] [facility]                                   [address] [address]      Primary Insurance (for UNOS reporting): Public Insurance - Medicaid-[facility]  Secondary Insurance (for UNOS reporting): None    Patient reports patient is able to obtain and afford medications at this time and at time of discharge.    Living Will/Healthcare Power of     Patient states patient does not have a LW and/or HCPA.   provided education regarding LW and HCPA and the completion of forms.    Coping/Mental Health    Patient is coping adequately with the aid of  family members and bhavin. Pt appears positive and motivated to continue working toward physical recovery and substance abuse recovery.  Patient denies current mental health difficulties.     Discharge Planning    At time of discharge, patient plans to return to patient's home under the care of pt's son Zaire.  Patients son will transport patient.  Per rounds today, expected discharge date has not been medically determined at this time. Patient and patients caregiver  verbalize understanding and are involved in treatment planning and discharge process.    Additional Concerns  Pt reports she will notify SW of disability outcome and will also contact SW once  enrolled in substance abuse day program. Patient is being followed for needs, education, resources, information, emotional support, supportive counseling, and for supportive and skilled discharge plan of care.  providing ongoing psychosocial support, education, resources and d/c planning as needed.  SW remains available.  provided resource list, patient choice, psychosocial and supportive counseling, resources, education, assistance and discharge planning with patient and caregiver involvement, ongoing SW availability and services as appropriate.

## 2018-02-14 NOTE — ASSESSMENT & PLAN NOTE
Cont prograf. Draw prograf level daily and adjust dose as needed to maintain a therapeutic level.   - hold cellcept as with neutropenia

## 2018-02-14 NOTE — ASSESSMENT & PLAN NOTE
- with significantly low WBC and ANC as outpatient.   - Neupogen on admit and ANC with improvement   - hold off on Neupogen today.  - infectious workup neg so far.

## 2018-02-14 NOTE — PLAN OF CARE
Problem: Patient Care Overview  Goal: Plan of Care Review  Outcome: Ongoing (interventions implemented as appropriate)  Patient aaox4. Bed kept locked, lowest position with 2x side rails up while in bed. nonslip footwear when out of bed. Ambulates independently. Call bell and personal items within reach. Son at bedside. Regular diet with boost. Patient reports nausea and 1x emesis, given prn medications with mild relief.  left FA PIV cdi. Urine analysis and culture sent. Standard precautions maintained. Plan for discharge tomorrow.

## 2018-02-15 VITALS
RESPIRATION RATE: 16 BRPM | SYSTOLIC BLOOD PRESSURE: 162 MMHG | HEART RATE: 84 BPM | BODY MASS INDEX: 21.17 KG/M2 | TEMPERATURE: 98 F | OXYGEN SATURATION: 100 % | HEIGHT: 63 IN | WEIGHT: 119.5 LBS | DIASTOLIC BLOOD PRESSURE: 90 MMHG

## 2018-02-15 LAB
ALBUMIN SERPL BCP-MCNC: 2.6 G/DL
ALP SERPL-CCNC: 296 U/L
ALT SERPL W/O P-5'-P-CCNC: 16 U/L
ANION GAP SERPL CALC-SCNC: 9 MMOL/L
AST SERPL-CCNC: 18 U/L
BACTERIA UR CULT: NO GROWTH
BASOPHILS # BLD AUTO: 0.08 K/UL
BASOPHILS NFR BLD: 1.6 %
BILIRUB SERPL-MCNC: 1.1 MG/DL
BUN SERPL-MCNC: 7 MG/DL
CALCIUM SERPL-MCNC: 8.8 MG/DL
CHLORIDE SERPL-SCNC: 103 MMOL/L
CMV DNA SERPL NAA+PROBE-ACNC: NORMAL IU/ML
CMV DNA SERPL NAA+PROBE-ACNC: NORMAL IU/ML
CO2 SERPL-SCNC: 24 MMOL/L
CREAT SERPL-MCNC: 0.7 MG/DL
DIFFERENTIAL METHOD: ABNORMAL
EOSINOPHIL # BLD AUTO: 0.3 K/UL
EOSINOPHIL NFR BLD: 5.3 %
ERYTHROCYTE [DISTWIDTH] IN BLOOD BY AUTOMATED COUNT: 15.5 %
EST. GFR  (AFRICAN AMERICAN): >60 ML/MIN/1.73 M^2
EST. GFR  (NON AFRICAN AMERICAN): >60 ML/MIN/1.73 M^2
GLUCOSE SERPL-MCNC: 71 MG/DL
HCT VFR BLD AUTO: 30.6 %
HGB BLD-MCNC: 9.9 G/DL
IMM GRANULOCYTES # BLD AUTO: 0.02 K/UL
IMM GRANULOCYTES NFR BLD AUTO: 0.4 %
LYMPHOCYTES # BLD AUTO: 1 K/UL
LYMPHOCYTES NFR BLD: 20.5 %
MAGNESIUM SERPL-MCNC: 1.3 MG/DL
MCH RBC QN AUTO: 27.3 PG
MCHC RBC AUTO-ENTMCNC: 32.4 G/DL
MCV RBC AUTO: 85 FL
MONOCYTES # BLD AUTO: 1 K/UL
MONOCYTES NFR BLD: 20.9 %
NEUTROPHILS # BLD AUTO: 2.5 K/UL
NEUTROPHILS NFR BLD: 51.3 %
NRBC BLD-RTO: 0 /100 WBC
PLATELET # BLD AUTO: 207 K/UL
PMV BLD AUTO: 10.8 FL
POTASSIUM SERPL-SCNC: 3.8 MMOL/L
PROT SERPL-MCNC: 6 G/DL
RBC # BLD AUTO: 3.62 M/UL
SODIUM SERPL-SCNC: 136 MMOL/L
TACROLIMUS BLD-MCNC: 10.9 NG/ML
WBC # BLD AUTO: 4.93 K/UL

## 2018-02-15 PROCEDURE — 85025 COMPLETE CBC W/AUTO DIFF WBC: CPT

## 2018-02-15 PROCEDURE — 99239 HOSP IP/OBS DSCHRG MGMT >30: CPT | Mod: 24,,, | Performed by: PHYSICIAN ASSISTANT

## 2018-02-15 PROCEDURE — 80321 ALCOHOLS BIOMARKERS 1OR 2: CPT

## 2018-02-15 PROCEDURE — 80053 COMPREHEN METABOLIC PANEL: CPT

## 2018-02-15 PROCEDURE — 36415 COLL VENOUS BLD VENIPUNCTURE: CPT

## 2018-02-15 PROCEDURE — 63600175 PHARM REV CODE 636 W HCPCS: Performed by: STUDENT IN AN ORGANIZED HEALTH CARE EDUCATION/TRAINING PROGRAM

## 2018-02-15 PROCEDURE — 63600175 PHARM REV CODE 636 W HCPCS: Performed by: PHYSICIAN ASSISTANT

## 2018-02-15 PROCEDURE — 83735 ASSAY OF MAGNESIUM: CPT

## 2018-02-15 PROCEDURE — 25000003 PHARM REV CODE 250: Performed by: PHYSICIAN ASSISTANT

## 2018-02-15 PROCEDURE — 80197 ASSAY OF TACROLIMUS: CPT

## 2018-02-15 PROCEDURE — 25000003 PHARM REV CODE 250: Performed by: NURSE PRACTITIONER

## 2018-02-15 PROCEDURE — 25000003 PHARM REV CODE 250: Performed by: STUDENT IN AN ORGANIZED HEALTH CARE EDUCATION/TRAINING PROGRAM

## 2018-02-15 RX ORDER — TACROLIMUS 1 MG/1
CAPSULE ORAL
Qty: 210 CAPSULE | Refills: 11 | Status: SHIPPED | OUTPATIENT
Start: 2018-02-15 | End: 2018-02-21 | Stop reason: SDUPTHER

## 2018-02-15 RX ORDER — NIFEDIPINE 60 MG/1
60 TABLET, EXTENDED RELEASE ORAL DAILY
Qty: 30 TABLET | Refills: 11 | Status: SHIPPED | OUTPATIENT
Start: 2018-02-16 | End: 2019-10-31

## 2018-02-15 RX ORDER — LANOLIN ALCOHOL/MO/W.PET/CERES
400 CREAM (GRAM) TOPICAL ONCE
Status: COMPLETED | OUTPATIENT
Start: 2018-02-15 | End: 2018-02-15

## 2018-02-15 RX ORDER — LANOLIN ALCOHOL/MO/W.PET/CERES
400 CREAM (GRAM) TOPICAL 2 TIMES DAILY
Qty: 60 TABLET | Refills: 1 | Status: ON HOLD | OUTPATIENT
Start: 2018-02-15 | End: 2018-03-17

## 2018-02-15 RX ORDER — VALGANCICLOVIR 450 MG/1
450 TABLET, FILM COATED ORAL DAILY
Qty: 30 TABLET | Refills: 2 | Status: ON HOLD | OUTPATIENT
Start: 2018-02-15 | End: 2018-03-02 | Stop reason: HOSPADM

## 2018-02-15 RX ORDER — MAGNESIUM SULFATE HEPTAHYDRATE 40 MG/ML
2 INJECTION, SOLUTION INTRAVENOUS ONCE
Status: COMPLETED | OUTPATIENT
Start: 2018-02-15 | End: 2018-02-15

## 2018-02-15 RX ADMIN — NIFEDIPINE 60 MG: 60 TABLET, FILM COATED, EXTENDED RELEASE ORAL at 08:02

## 2018-02-15 RX ADMIN — MAGNESIUM OXIDE TAB 400 MG (241.3 MG ELEMENTAL MG) 400 MG: 400 (241.3 MG) TAB at 11:02

## 2018-02-15 RX ADMIN — VALGANCICLOVIR 450 MG: 450 TABLET, FILM COATED ORAL at 08:02

## 2018-02-15 RX ADMIN — TACROLIMUS 4 MG: 1 CAPSULE ORAL at 08:02

## 2018-02-15 RX ADMIN — HEPARIN SODIUM 5000 UNITS: 5000 INJECTION, SOLUTION INTRAVENOUS; SUBCUTANEOUS at 05:02

## 2018-02-15 RX ADMIN — MAGNESIUM SULFATE IN WATER 2 G: 40 INJECTION, SOLUTION INTRAVENOUS at 11:02

## 2018-02-15 RX ADMIN — ASPIRIN 81 MG: 81 TABLET, COATED ORAL at 08:02

## 2018-02-15 NOTE — PROGRESS NOTES
Discharge Medication Note:    Hospital Course:  Ms. Bazan in a  53 YO female s/p DBD liver transplant on 12/26/2017 due to ESLD secondary to alcoholic liver disease. She was admitted for decreasing ANC when she was feeling weak. She received Neupogen x1 while inpatient and her Cellcept and Bactrim are to be held. Will continue valcyte given history of CMV pre-transplant. She is now stable for discharge.     Patient received pentamidine x1 today (2/14/2018).  Of note, magnesium oxide was started for supplementation.  Nifedipine was adjusted to 60mg once daily.  Prograf was adjusted to 4mg in AM and 3mg in PM      Met with Marcie Bazan at discharge to review discharge medications and to update the blue medication card.       Beni Marcie Rios   Home Medication Instructions JUAN MANUEL:[id number]    Printed on:02/15/18 4712   Medication Information                      aspirin (ECOTRIN) 81 MG EC tablet  Take 1 tablet (81 mg total) by mouth once daily.             bisacodyl (DULCOLAX) 5 mg EC tablet  Take 1-2 tablets (5-10 mg total) by mouth daily as needed for Constipation.             famotidine (PEPCID) 20 MG tablet  Take 1 tablet (20 mg total) by mouth every evening.             HYDROmorphone (DILAUDID) 4 MG tablet  Take 0.5-1 tablets (2-4 mg total) by mouth every 6 (six) hours as needed.             magnesium oxide (MAG-OX) 400 mg tablet  Take 1 tablet (400 mg total) by mouth 2 (two) times daily.             NIFEdipine (PROCARDIA-XL) 60 MG (OSM) 24 hr tablet  Take 1 tablet (60 mg total) by mouth once daily.             ondansetron (ZOFRAN-ODT) 4 MG TbDL  Take 1 tablet (4 mg total) by mouth every 8 (eight) hours as needed (nausea and vomiting).             tacrolimus (PROGRAF) 1 MG Cap  Take 4 mg PO QAM and 3 mg PO QPM             traZODone (DESYREL) 50 MG tablet  Take 0.5 tablets (25 mg total) by mouth every evening.             valGANciclovir (VALCYTE) 450 mg Tab  Take 1 tablet (450 mg total) by mouth once daily. STOP  3/26/18                 Pt was provided with prescriptions for the following meds:  E-rx: Mag-Ox, Valcyte, and Nifedipine  Printed rx: NA  Phone-in or faxed rx: Sent to [facility] per pt request.    The following medications have been placed on HOLD and should be restarted in the outpatient setting (when appropriate): Cellcept and Bactrim are to be held in the setting of leukopenia until further notice.     Marcie Bazan verbalized understanding and had the opportunity to ask questions.

## 2018-02-15 NOTE — PROGRESS NOTES
"Discharge Note:  SW met with pt and son Zaire in order to discuss discharge planning. Pt AAOx4 and states that she feels "great." Pt reports that she is ready to go home but still having trouble with enjoying the taste of various foods. Pt reports she will be following up with outpatient PT at [facility] in  as well as outpatient [facility] IOP at [facility]. Pt states that she will notify the SW when she has enrolled in the IOP. Pt also has a SSDI court hearing on Friday Feb 16th to appeal her denial for disability benefits. SW requested that pt contact team when a decision has been made by the . Pt expressed understanding. Pt and son had no other questions or concerns at this time. SW remains available for continued psychosocial support, education, resources, and additional d/c planning as needed.   "

## 2018-02-15 NOTE — PROGRESS NOTES
Discharge instructions given to and reviewed with patient and son. Reviewed follow up appointment and when to call MD. Follow-up labs reviewed by ELKIN Knight RN. Updated blue card and prescriptions given to patient per Ph.D. Patient with c/o of muscle spasms earlier today - PA aware and patient instructed to follow-up with PCP if prescription required. 2gm IV Magnesium given prior to d/c. Patient in NAD. Wheelchair and cart requested per escort.

## 2018-02-15 NOTE — PLAN OF CARE
Problem: Patient Care Overview  Goal: Plan of Care Review  Outcome: Ongoing (interventions implemented as appropriate)  AAOx3, tmax 99.3, c/o pain. Pain controlled by prn pain medication. Pt able to position self independently. Pt in lowest position, side rails up x2, non-skid foot wear in place, call light within reach, pt verbalized understanding to call RN when needed. Hand hygiene practiced per protocol. Will continue to monitor.

## 2018-02-15 NOTE — DISCHARGE SUMMARY
[facility]  Liver Transplant  Discharge Summary      Patient Name: Marcie Bazan  MRN: [id number]  Admission Date: 2018  Hospital Length of Stay: 1 days  Discharge Date and Time:  02/15/2018 1:30 PM  Attending Physician: Ashley Garduno MD   Discharging Provider: Rayne Currie PA-C  Primary Care Provider: ODILIA Wall  Post-Operative Day: 51     ORGAN:   LIVER  Disease Etiology: Alcoholic Cirrhosis  Donor Type:    - Brain Death  CDC High Risk:   No  Donor CMV Status:   Donor CMV Status: Negative  Donor HBcAB:   Negative  Donor HCV Status:   Negative  Whole or Partial: Whole Liver  Biliary Anastomosis: End to End  Arterial Anatomy: Standard    Hospital Course:  Ms. Marcie Bazan is a 52 y.o. 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 The patient presented to the ED with a week of decreasing ANC to 0.2. She reported that she has felt weak, has not been able to eat well due to vomiting, and has body aches. The patient had subjected fevers at home but no documented fevers while in the Emergency Department. She started to have a down trend in her ANC on  and valcyte and cellcept held at that time. Neupogen given on admit due to ANC <200.  She was placed on antibiotics on admission to the hospital. Infectious workup was initiated on admit (blood cx   NGTD, urine cx from  no significant growth, repeat urine cx sent on  pending at time of discharge, CMV PCR  pending).  Antibiotics were discontinued on  and she is has remained afebrile. She only require neupogen x 1 during admission on . On day of discharge, patient feeling well without complaint. She is stable and ready for discharge. She will follow up with labs on  and  along with clinic on . Patient verbalized her understanding prior to discharge.    With hypomagnesemia during admission. Magnesium replaced with IV  prior to discharge. She will be discharged home on oral magnesium replacement. Magnesium to be checked with Monday's labs.     Of note, patient received pentamidine on 2/14 as bactrim is on hold for neutropenia. She will continue on Valcyte as hx of CMV prior to transplant. CMV PCR 2/12 pending. Cellcept will continue to be held at time of discharge.     * No surgery found *       Final Active Diagnoses:    Diagnosis Date Noted POA    PRINCIPAL PROBLEM:  Neutropenia [D70.9] 02/12/2018 Unknown    Long-term use of immunosuppressant medication [Z79.899] 02/14/2018 Not Applicable    Leukopenia [D72.819] 02/12/2018 Yes    Neutropenic fever [D70.9, R50.81] 02/12/2018 Yes    At risk for opportunistic infections [Z91.89] 12/30/2017 Yes    Liver transplant 12/26/2017 for ETOH [Z94.4] 12/26/2017 Not Applicable    Prophylactic immunotherapy [Z29.8] 12/26/2017 Not Applicable    Anemia of chronic disease [D63.8] 08/27/2015 Yes      Problems Resolved During this Admission:    Diagnosis Date Noted Date Resolved POA       Consults         Status Ordering Provider     Inpatient consult to Hematology  Once     Provider:  (Not yet assigned)    Completed OSCAR EATON     Inpatient consult to Liver Transplant  Once     Provider:  (Not yet assigned)    Acknowledged AYDE VERGARA          Pending Diagnostic Studies:     Procedure Component Value Units Date/Time    CMV DNA, quantitative, PCR [[id number]] Collected:  02/12/18 2323    Order Status:  Sent Lab Status:  In process Updated:  02/12/18 2329    Specimen:  Blood from Blood     Phosphatidylethanol (PETH) [[id number]] Collected:  02/15/18 0418    Order Status:  Sent Lab Status:  In process Updated:  02/15/18 0523    Specimen:  Blood from Blood         Significant Diagnostic Studies: Labs:   CMP     Recent Labs  Lab 02/14/18  0551 02/15/18  0418    136   K 3.5 3.8    103   CO2 23 24   GLU 74 71   BUN 8 7   CREATININE 0.7 0.7   CALCIUM 8.7 8.8   PROT  5.8* 6.0   ALBUMIN 2.6* 2.6*   BILITOT 1.4* 1.1*   ALKPHOS 312* 296*   AST 18 18   ALT 19 16   ANIONGAP 10 9   ESTGFRAFRICA >60.0 >60.0   EGFRNONAA >60.0 >60.0    and CBC     Recent Labs  Lab 02/14/18  0551 02/15/18  0418   WBC 8.73 4.93   HGB 10.0* 9.9*   HCT 30.0* 30.6*    207       The patients clinical status was discussed at multidisplinary rounds, involving transplant surgery, transplant medicine, pharmacy, nursing, nutrition, and social work    Discharged Condition: good    Disposition: Home or Self Care    Follow Up: As above.    Patient Instructions:     Activity as tolerated     Notify your health care provider if you experience any of the following:  temperature >100.4     Notify your health care provider if you experience any of the following:  persistent nausea and vomiting or diarrhea     Notify your health care provider if you experience any of the following:  severe uncontrolled pain     Notify your health care provider if you experience any of the following:  redness, tenderness, or signs of infection (pain, swelling, redness, odor or green/yellow discharge around incision site)     Notify your health care provider if you experience any of the following:  difficulty breathing or increased cough     Notify your health care provider if you experience any of the following:  severe persistent headache     Notify your health care provider if you experience any of the following:  worsening rash     Notify your health care provider if you experience any of the following:  persistent dizziness, light-headedness, or visual disturbances     Notify your health care provider if you experience any of the following:  increased confusion or weakness     Notify your health care provider if you experience any of the following:   Order Comments: For any other concerning signs or symptoms.       Medications:  Reconciled Home Medications:   Current Discharge Medication List      START taking these medications     Details   magnesium oxide (MAG-OX) 400 mg tablet Take 1 tablet (400 mg total) by mouth 2 (two) times daily.  Qty: 60 tablet, Refills: 1      NIFEdipine (PROCARDIA-XL) 60 MG (OSM) 24 hr tablet Take 1 tablet (60 mg total) by mouth once daily.  Qty: 30 tablet, Refills: 11      valGANciclovir (VALCYTE) 450 mg Tab Take 1 tablet (450 mg total) by mouth once daily. STOP 3/26/18  Qty: 30 tablet, Refills: 2         CONTINUE these medications which have CHANGED    Details   tacrolimus (PROGRAF) 1 MG Cap Take 4 mg PO QAM and 3 mg PO QPM  Qty: 210 capsule, Refills: 11         CONTINUE these medications which have NOT CHANGED    Details   aspirin (ECOTRIN) 81 MG EC tablet Take 1 tablet (81 mg total) by mouth once daily.  Qty: 30 tablet, Refills: 11      bisacodyl (DULCOLAX) 5 mg EC tablet Take 1-2 tablets (5-10 mg total) by mouth daily as needed for Constipation.  Qty: 30 tablet, Refills: 0      famotidine (PEPCID) 20 MG tablet Take 1 tablet (20 mg total) by mouth every evening.  Qty: 30 tablet, Refills: 11      HYDROmorphone (DILAUDID) 4 MG tablet Take 0.5-1 tablets (2-4 mg total) by mouth every 6 (six) hours as needed.  Qty: 20 tablet, Refills: 0      ondansetron (ZOFRAN-ODT) 4 MG TbDL Take 1 tablet (4 mg total) by mouth every 8 (eight) hours as needed (nausea and vomiting).  Qty: 12 tablet, Refills: 0      traZODone (DESYREL) 50 MG tablet Take 0.5 tablets (25 mg total) by mouth every evening.  Qty: 15 tablet, Refills: 11         STOP taking these medications       furosemide (LASIX) 20 MG tablet Comments:   Reason for Stopping:         NIFEdipine (PROCARDIA) 10 MG Cap Comments:   Reason for Stopping:         oseltamivir (TAMIFLU) 45 MG capsule Comments:   Reason for Stopping:         predniSONE (DELTASONE) 10 MG tablet Comments:   Reason for Stopping:         sulfamethoxazole-trimethoprim 400-80mg (BACTRIM,SEPTRA) 400-80 mg per tablet Comments:   Reason for Stopping:             Time spent caring for patient (Greater than  1/2 spent in direct face-to-face contact): > 30 minutes    Rayne Currie PA-C  Liver Transplant  [facility]

## 2018-02-16 PROBLEM — D70.9 NEUTROPENIA: Status: ACTIVE | Noted: 2018-02-16

## 2018-02-16 RX ORDER — ONDANSETRON 4 MG/1
TABLET, ORALLY DISINTEGRATING ORAL
Qty: 12 TABLET | Refills: 2 | Status: ON HOLD | OUTPATIENT
Start: 2018-02-16 | End: 2018-03-02

## 2018-02-16 NOTE — TELEPHONE ENCOUNTER
Zarxio shipment arranged - Ms. Bazan declined clinical consultation.  Zarxio will be shipped on  to arrive at patient's home on  via [employer]. $3.00 copay (bill sent to home). Patient will inject Zarxio when instructed by transplant team based on labs.  Address confirmed. Confirmed 2 patient identifiers - name and . Therapy Appropriate. All questions answered and addressed to patients satisfaction.  Aware to call [facility] with any questions or concerns.

## 2018-02-16 NOTE — TELEPHONE ENCOUNTER
Explained to patient that the on-call nurse will review her labs with the doctor tomorrow and let her know if she needs a shot.  Expressed to her do not take the shot unless we tell her to.  She verbalized understanding of the plan.

## 2018-02-17 ENCOUNTER — NURSE TRIAGE (OUTPATIENT)
Dept: ADMINISTRATIVE | Facility: CLINIC | Age: 53
End: 2018-02-17

## 2018-02-17 ENCOUNTER — TELEPHONE (OUTPATIENT)
Dept: TRANSPLANT | Facility: CLINIC | Age: 53
End: 2018-02-17

## 2018-02-17 ENCOUNTER — LAB VISIT (OUTPATIENT)
Dept: LAB | Facility: HOSPITAL | Age: 53
End: 2018-02-17
Attending: INTERNAL MEDICINE
Payer: COMMERCIAL

## 2018-02-17 DIAGNOSIS — Z94.4 LIVER REPLACED BY TRANSPLANT: ICD-10-CM

## 2018-02-17 LAB
BACTERIA BLD CULT: NORMAL
BACTERIA BLD CULT: NORMAL
BASOPHILS # BLD AUTO: 0.09 K/UL
BASOPHILS NFR BLD: 3.3 %
DIFFERENTIAL METHOD: ABNORMAL
EOSINOPHIL # BLD AUTO: 0.2 K/UL
EOSINOPHIL NFR BLD: 5.8 %
ERYTHROCYTE [DISTWIDTH] IN BLOOD BY AUTOMATED COUNT: 15.6 %
HCT VFR BLD AUTO: 32.6 %
HGB BLD-MCNC: 10.6 G/DL
LYMPHOCYTES # BLD AUTO: 0.8 K/UL
LYMPHOCYTES NFR BLD: 29.2 %
MCH RBC QN AUTO: 27.8 PG
MCHC RBC AUTO-ENTMCNC: 32.5 G/DL
MCV RBC AUTO: 86 FL
MONOCYTES # BLD AUTO: 0.8 K/UL
MONOCYTES NFR BLD: 30.7 %
NEUTROPHILS # BLD AUTO: 0.9 K/UL
NEUTROPHILS NFR BLD: 31.4 %
PLATELET # BLD AUTO: 222 K/UL
PMV BLD AUTO: 10.7 FL
RBC # BLD AUTO: 3.81 M/UL
WBC # BLD AUTO: 2.74 K/UL

## 2018-02-17 PROCEDURE — 36415 COLL VENOUS BLD VENIPUNCTURE: CPT | Mod: PO

## 2018-02-17 PROCEDURE — 85025 COMPLETE CBC W/AUTO DIFF WBC: CPT | Mod: PO

## 2018-02-17 NOTE — TELEPHONE ENCOUNTER
CBC repeated today for neutropenia.  ANC = 860.  Pt ANC <1000 and needs neupogen.  Medication previously ordered and available to pt per GABRIELLE Dailey RN.  Called pt to discuss results and discuss need for neupogen.  Pt reports medication available and will be administered today.  Pt scheduled to repeat labs on Monday.  Instructed pt to to contact [facility] for any questions and/or concerns. Pt verbalized understanding.

## 2018-02-17 NOTE — TELEPHONE ENCOUNTER
"    Reason for Disposition   Caller has URGENT medication question about med that PCP prescribed and triager unable to answer question    Answer Assessment - Initial Assessment Questions  1. SYMPTOMS: "Do you have any symptoms?"      N/a. "I need to know the instructions for the Zarxio injections."  2. SEVERITY: If symptoms are present, ask "Are they mild, moderate or severe?"    Protocols used: ST MEDICATION QUESTION CALL-A-    Liver transplant 12/26/2017    Patient called to ask about the frequency of her Zarxio (neupogen) injections. Patient was contacted by liver coordinator today about taking the injection for neutropenia. Called Ms. Chappell the coordinator who reviewed the lab results with the patient this morning. Ms. Chappell was informed about patient's questions and the MAR not having this medication listed. Ms. Chappell will contact the patient to discuss and call [facility] back for information about the location of the treatment plan. Patient verbalized understanding that she will receive a call back about the other injections.   "

## 2018-02-17 NOTE — TELEPHONE ENCOUNTER
Received phone call from [facility] re: Zarxio instructions. Reviewed Epic documentation. RX submitted on 2/9/18 to [facility] and signed by Dr. Souza. Pt received phone call from LYN Villalobos with [facility] yesterday(see charting).  Called pt to verify Zarxio administration and dosage. Pt reports receiving 5 pre-filled syringes. Pt reports administering Zarxio 480 mcg/ml subcutaneously.  Pt has stored remaining syringes in refrigerator as stated in instructions.  Instructed pt that transplant staff will notify her when another injection is needed.  Pt verbalized understanding.    Notified TED Dixon with [facility] of existing Epic documentation and verified that pt received instructions and has no further questions.

## 2018-02-18 ENCOUNTER — NURSE TRIAGE (OUTPATIENT)
Dept: ADMINISTRATIVE | Facility: CLINIC | Age: 53
End: 2018-02-18

## 2018-02-18 NOTE — TELEPHONE ENCOUNTER
"-  Reason for Disposition   [1] SEVERE back pain (e.g., excruciating) AND [2] sudden onset AND [3] age > 60     Not over 60 but she is a transplant patient    Answer Assessment - Initial Assessment Questions  1. SYMPTOMS: "Do you have any symptoms?"      Muscle spasms in the tailbone to the neck   2. SEVERITY: If symptoms are present, ask "Are they mild, moderate or severe?"      severe    Answer Assessment - Initial Assessment Questions  1. ONSET: "When did the pain begin?"       When she was in the hospital   2. LOCATION: "Where does it hurt?" (upper, mid or lower back)      It travels from her tailbone to her neck  3. SEVERITY: "How bad is the pain?"  (e.g., Scale 1-10; mild, moderate, or severe)    - MILD (1-3): doesn't interfere with normal activities     - MODERATE (4-7): interferes with normal activities or awakens from sleep     - SEVERE (8-10): excruciating pain, unable to do any normal activities       severe  4. PATTERN: "Is the pain constant?" (e.g., yes, no; constant, intermittent)       intermittent  5. RADIATION: "Does the pain shoot into your legs or elsewhere?"      Up and down spine   6. CAUSE:  "What do you think is causing the back pain?"       unknown  7. BACK OVERUSE:  "Any recent lifting of heavy objects, strenuous work or exercise?"      n/a  8. MEDICATIONS: "What have you taken so far for the pain?" (e.g., nothing, acetaminophen, NSAIDS)      Given muscle relaxer in the hospital   9. NEUROLOGIC SYMPTOMS: "Do you have any weakness, numbness, or problems with bowel/bladder control?"        10. OTHER SYMPTOMS: "Do you have any other symptoms?" (e.g., fever, abdominal pain, burning with urination, blood in urine)        nausea  11. PREGNANCY: "Is there any chance you are pregnant?" (e.g., yes, no; LMP)    Protocols used: ST BACK PAIN-A-,  MEDICATION QUESTION CALL-A-    Liver Transplant 12/26/2017    Patient called to request muscle relaxer Rx forsevere pain up and down her spine and for " nausea because the pain is making her nauseated. Recommended for patient to go to the ED now, but she states she will wait to see if her daughter can bring her today after lunch. No other issues right now.

## 2018-02-18 NOTE — TELEPHONE ENCOUNTER
No bpa used  Liver transplant 12/26/2017  Pt was actively vomiting on the call- refused triage. She wants something for nausea and a muscle relaxer called in. States that these issues have been going on since she was discharged from the hospital. Please contact patient to advise    Reason for Disposition   Caller requesting a NON-URGENT new prescription or refill and triager unable to refill per unit policy    Protocols used: ST MEDICATION QUESTION CALL-A-AH

## 2018-02-19 ENCOUNTER — LAB VISIT (OUTPATIENT)
Dept: LAB | Facility: HOSPITAL | Age: 53
End: 2018-02-19
Attending: INTERNAL MEDICINE
Payer: COMMERCIAL

## 2018-02-19 ENCOUNTER — TELEPHONE (OUTPATIENT)
Dept: TRANSPLANT | Facility: CLINIC | Age: 53
End: 2018-02-19

## 2018-02-19 DIAGNOSIS — Z94.4 LIVER REPLACED BY TRANSPLANT: ICD-10-CM

## 2018-02-19 LAB
ALBUMIN SERPL BCP-MCNC: 3.4 G/DL
ALP SERPL-CCNC: 411 U/L
ALT SERPL W/O P-5'-P-CCNC: 46 U/L
ANION GAP SERPL CALC-SCNC: 11 MMOL/L
AST SERPL-CCNC: 68 U/L
BASOPHILS # BLD AUTO: 0.08 K/UL
BASOPHILS NFR BLD: 0.6 %
BILIRUB DIRECT SERPL-MCNC: 0.6 MG/DL
BILIRUB SERPL-MCNC: 1 MG/DL
BUN SERPL-MCNC: 10 MG/DL
CALCIUM SERPL-MCNC: 9.8 MG/DL
CHLORIDE SERPL-SCNC: 100 MMOL/L
CO2 SERPL-SCNC: 25 MMOL/L
CREAT SERPL-MCNC: 1 MG/DL
DIFFERENTIAL METHOD: ABNORMAL
EOSINOPHIL # BLD AUTO: 0.3 K/UL
EOSINOPHIL NFR BLD: 1.9 %
ERYTHROCYTE [DISTWIDTH] IN BLOOD BY AUTOMATED COUNT: 15.8 %
EST. GFR  (AFRICAN AMERICAN): >60 ML/MIN/1.73 M^2
EST. GFR  (NON AFRICAN AMERICAN): >60 ML/MIN/1.73 M^2
GLUCOSE SERPL-MCNC: 95 MG/DL
HCT VFR BLD AUTO: 32.9 %
HGB BLD-MCNC: 10.7 G/DL
LYMPHOCYTES # BLD AUTO: 2.2 K/UL
LYMPHOCYTES NFR BLD: 16.6 %
MCH RBC QN AUTO: 27.7 PG
MCHC RBC AUTO-ENTMCNC: 32.5 G/DL
MCV RBC AUTO: 85 FL
MONOCYTES # BLD AUTO: 0.5 K/UL
MONOCYTES NFR BLD: 3.8 %
NEUTROPHILS # BLD AUTO: 10 K/UL
NEUTROPHILS NFR BLD: 78.1 %
PLATELET # BLD AUTO: 243 K/UL
PLATELET BLD QL SMEAR: ABNORMAL
PMV BLD AUTO: 10.6 FL
POTASSIUM SERPL-SCNC: 3.8 MMOL/L
PROT SERPL-MCNC: 7.1 G/DL
RBC # BLD AUTO: 3.86 M/UL
SODIUM SERPL-SCNC: 136 MMOL/L
WBC # BLD AUTO: 12.99 K/UL

## 2018-02-19 PROCEDURE — 80053 COMPREHEN METABOLIC PANEL: CPT | Mod: PO

## 2018-02-19 PROCEDURE — 36415 COLL VENOUS BLD VENIPUNCTURE: CPT | Mod: PO

## 2018-02-19 PROCEDURE — 85025 COMPLETE CBC W/AUTO DIFF WBC: CPT | Mod: PO

## 2018-02-19 PROCEDURE — 80197 ASSAY OF TACROLIMUS: CPT

## 2018-02-19 PROCEDURE — 82248 BILIRUBIN DIRECT: CPT | Mod: PO

## 2018-02-19 NOTE — TELEPHONE ENCOUNTER
"Called patient to check on her since, she called the on-call nurse complaining of nausea.  She was able to get her Zofran filled.  She states, "I have been throwing up since, transplant".  I told her to let Dr Souza know when she sees her on Wednesday.  Also let her know her liver enzymes were up and I would review them with Dr Souza.  "

## 2018-02-20 LAB — TACROLIMUS BLD-MCNC: 3.4 NG/ML

## 2018-02-21 ENCOUNTER — TELEPHONE (OUTPATIENT)
Dept: GASTROENTEROLOGY | Facility: CLINIC | Age: 53
End: 2018-02-21

## 2018-02-21 LAB — CMV DNA SERPL NAA+PROBE-ACNC: NORMAL IU/ML

## 2018-02-21 NOTE — TELEPHONE ENCOUNTER
----- Message from Joselin Souza MD sent at 2/21/2018 10:23 AM CST -----  Tacro level low and liver tests up increase to 5mg in the morning and 4mg in the evening repeat labs Friday

## 2018-02-21 NOTE — TELEPHONE ENCOUNTER
Dr. Souza reviewed your labs.  Called patient to have her increase Prograf to 5 mg in the morning and 4 mg in the evening, she needs to repeat labs on Friday.  Voicemail left.  Patient will be seen in clinic today.

## 2018-02-21 NOTE — TELEPHONE ENCOUNTER
----- Message from Sonia Schneider sent at 2/21/2018  2:41 PM CST -----  Contact: pt  Calling about an appointment and please advise [phone] (home)

## 2018-02-22 ENCOUNTER — LAB VISIT (OUTPATIENT)
Dept: LAB | Facility: HOSPITAL | Age: 53
End: 2018-02-22
Attending: INTERNAL MEDICINE
Payer: COMMERCIAL

## 2018-02-22 DIAGNOSIS — Z94.4 LIVER REPLACED BY TRANSPLANT: ICD-10-CM

## 2018-02-22 LAB
ALBUMIN SERPL BCP-MCNC: 2.9 G/DL
ALP SERPL-CCNC: 740 U/L
ALT SERPL W/O P-5'-P-CCNC: 105 U/L
ANION GAP SERPL CALC-SCNC: 11 MMOL/L
ANISOCYTOSIS BLD QL SMEAR: SLIGHT
AST SERPL-CCNC: 164 U/L
BASOPHILS # BLD AUTO: 0.08 K/UL
BASOPHILS NFR BLD: 2.2 %
BILIRUB DIRECT SERPL-MCNC: 0.8 MG/DL
BILIRUB SERPL-MCNC: 1.2 MG/DL
BUN SERPL-MCNC: 11 MG/DL
CALCIUM SERPL-MCNC: 9.4 MG/DL
CHLORIDE SERPL-SCNC: 103 MMOL/L
CO2 SERPL-SCNC: 26 MMOL/L
CREAT SERPL-MCNC: 1 MG/DL
DIFFERENTIAL METHOD: ABNORMAL
EOSINOPHIL # BLD AUTO: 0.3 K/UL
EOSINOPHIL NFR BLD: 7.2 %
ERYTHROCYTE [DISTWIDTH] IN BLOOD BY AUTOMATED COUNT: 16.1 %
EST. GFR  (AFRICAN AMERICAN): >60 ML/MIN/1.73 M^2
EST. GFR  (NON AFRICAN AMERICAN): >60 ML/MIN/1.73 M^2
GLUCOSE SERPL-MCNC: 82 MG/DL
HCT VFR BLD AUTO: 33.1 %
HGB BLD-MCNC: 10.8 G/DL
HYPOCHROMIA BLD QL SMEAR: ABNORMAL
LYMPHOCYTES # BLD AUTO: 0.9 K/UL
LYMPHOCYTES NFR BLD: 24.8 %
MCH RBC QN AUTO: 27.6 PG
MCHC RBC AUTO-ENTMCNC: 32.6 G/DL
MCV RBC AUTO: 84 FL
MONOCYTES # BLD AUTO: 0.7 K/UL
MONOCYTES NFR BLD: 19.8 %
NEUTROPHILS # BLD AUTO: 1.7 K/UL
NEUTROPHILS NFR BLD: 46.6 %
OVALOCYTES BLD QL SMEAR: ABNORMAL
PLATELET # BLD AUTO: 202 K/UL
PLATELET BLD QL SMEAR: ABNORMAL
PMV BLD AUTO: 10.6 FL
POIKILOCYTOSIS BLD QL SMEAR: SLIGHT
POTASSIUM SERPL-SCNC: 4.1 MMOL/L
PROT SERPL-MCNC: 6.8 G/DL
RBC # BLD AUTO: 3.92 M/UL
SODIUM SERPL-SCNC: 140 MMOL/L
WBC # BLD AUTO: 3.59 K/UL

## 2018-02-22 PROCEDURE — 36415 COLL VENOUS BLD VENIPUNCTURE: CPT | Mod: PO

## 2018-02-22 PROCEDURE — 85025 COMPLETE CBC W/AUTO DIFF WBC: CPT | Mod: PO

## 2018-02-22 PROCEDURE — 82248 BILIRUBIN DIRECT: CPT

## 2018-02-22 PROCEDURE — 80053 COMPREHEN METABOLIC PANEL: CPT

## 2018-02-22 PROCEDURE — 80197 ASSAY OF TACROLIMUS: CPT

## 2018-02-23 ENCOUNTER — TELEPHONE (OUTPATIENT)
Dept: GASTROENTEROLOGY | Facility: CLINIC | Age: 53
End: 2018-02-23

## 2018-02-23 ENCOUNTER — TELEPHONE (OUTPATIENT)
Dept: TRANSPLANT | Facility: CLINIC | Age: 53
End: 2018-02-23

## 2018-02-23 ENCOUNTER — TELEPHONE (OUTPATIENT)
Dept: PHARMACY | Facility: CLINIC | Age: 53
End: 2018-02-23

## 2018-02-23 LAB — TACROLIMUS BLD-MCNC: 4.6 NG/ML

## 2018-02-23 RX ORDER — TACROLIMUS 1 MG/1
CAPSULE ORAL
Qty: 270 CAPSULE | Refills: 11 | Status: ON HOLD | OUTPATIENT
Start: 2018-02-23 | End: 2018-03-02

## 2018-02-23 NOTE — TELEPHONE ENCOUNTER
Labs drawn 2/22,  ANC >1000, awaiting review by MD.  Notified pt to have labs drawn on Monday 2/26.

## 2018-02-23 NOTE — TELEPHONE ENCOUNTER
Follow up: Dejah  Pt declines consultation at this time. She has only taken 1 dose. WBC went down this week, but transplant team confirmed that she doesn't need to take a dose yet. Will f/u in 3 months. Informed pt to contact [facility] or MD for any questions or concerns.

## 2018-02-23 NOTE — TELEPHONE ENCOUNTER
----- Message from Nette Schneider sent at 2/23/2018  8:22 AM CST -----  Contact: patient   Patient have some medical questions to ask, also patient would like to schedule an appointment.         Please call [phone]      Thanks

## 2018-02-23 NOTE — TELEPHONE ENCOUNTER
----- Message from Alyssa Paige sent at 2/23/2018  1:11 PM CST -----  Contact: pt  She's calling in regards to speak with nurse she stated she has a few questions pls call pt back at [phone] (home)

## 2018-02-26 ENCOUNTER — CLINICAL SUPPORT (OUTPATIENT)
Dept: REHABILITATION | Facility: HOSPITAL | Age: 53
End: 2018-02-26
Payer: COMMERCIAL

## 2018-02-26 ENCOUNTER — PATIENT MESSAGE (OUTPATIENT)
Dept: TRANSPLANT | Facility: CLINIC | Age: 53
End: 2018-02-26

## 2018-02-26 ENCOUNTER — TELEPHONE (OUTPATIENT)
Dept: TRANSPLANT | Facility: CLINIC | Age: 53
End: 2018-02-26

## 2018-02-26 DIAGNOSIS — Z94.4 LIVER REPLACED BY TRANSPLANT: Primary | ICD-10-CM

## 2018-02-26 DIAGNOSIS — R53.1 WEAKNESS: ICD-10-CM

## 2018-02-26 DIAGNOSIS — M54.32 SCIATICA, LEFT SIDE: ICD-10-CM

## 2018-02-26 DIAGNOSIS — R53.81 PHYSICAL DECONDITIONING: Primary | ICD-10-CM

## 2018-02-26 LAB — PHOSPHATIDYLETHANOL (PETH): NEGATIVE NG/ML

## 2018-02-26 PROCEDURE — 97164 PT RE-EVAL EST PLAN CARE: CPT

## 2018-02-26 PROCEDURE — 97110 THERAPEUTIC EXERCISES: CPT

## 2018-02-26 NOTE — PROGRESS NOTES
PHYSICAL THERAPY RE-EVALUATION    Referring Provider:  eDvon Robles    Diagnosis:         ICD-10-CM ICD-9-CM    1. Physical deconditioning R53.81 799.3    2. Weakness R53.1 780.79    3.      Sciatica, left side   M54.32    Orders:  Evaluate and Treat    Date of Initial Evaluation: 1/16/18    Visit # 5 of 20    BACKGROUND:  Patient is a 53 y/o female 2 months s/p liver transplant due to alcoholic cirrhosis.  She has previously been seen post-operatively at the [facility] facility in [place] for 2 visits for this condition.  The patient reports that her stomach and legs are always hurting. The patient reports that the pain in her legs feel like burning pins and needles.  She also reports that her L LE feels heavy at times and starts dragging. The patient has a complicated medical history including liver failure, anemia, HTN, leukopenia, and history of alcohol abuse.    OBJECTIVE:  Gait: Pt ambulates with slow deliberate pace and decreased step length.    ROM:  WNL bilateral LEs    Palpation: Severe TTP in left foot due to neuropathy    Strength:    Strength Right Left   Hip Flexion 3/5 3-/5   Knee Extension 3+/5 2-/5   Ankle DF 4/5 3/5   Knee Flexors 4/5 3/5     Function:  Patient reports 48% impairment on the Optimal questionnaire.    ASSESSMENT:  The patient is a 52 y.o. year old female referred to physical therapy with deconditioning and L LE weakness following a liver transplant.  Patient presents decreased strength and mobility and significant weakness in her L LE. She also reports having neuropathy in bilateral LEs.   These impairments are limiting patient's ability to perform her daily activities including ADLs and community activities .  Patient will benefit from skilled physical therapy intervention to address impairments and allow her to perform her daily activities with minimal limitation.    Short Term GOALS: 3 weeks.   1. Pt will walk 150 feet with or without AD independently without need for rest  break in order to demonstrate increased independence and endurance for functional mobility around the home  2. Pt will increase strength to 3+/5 or greater in bilateral LEs.  3. Pt will perform 45 min of therex without need for rest break in order to show increased endurance to standing position required for household activities.  4. Pt will be I with HEP.     Long Term GOALS: 8-10  weeks. .  1. Pt will ambulate 300 feet independently without rest break in order to show increased independence and endurance to functional mobility in home and community  2. Pt will increase strength to >/= 4/5 MMT grade in BLE in order to perform ADLs without difficulty.  3. Pt will perform 20 min of standing therex without need for rest break in order to show increased endurance to standing position required for cooking at home  3. Pt's goal:  Independent with all ADLs and mobility, be able to cook for family     TREATMENT PROVIDED:  -Re-evaluation - 20 min  -Therapeutic Exercise:  10 min   - Recumbent bike - 1'   - Sciatic n glides with assist - 20x  -Education/HEP: Pt education on condition and how to decrease pain in her LEs    PLAN:  Patient will benefit from physical therapy (2-3) x/week for (4-6) weeks including therapeutic exercise, functional activities, neuromuscular re-education, gait training, modalities, and patient education.    Thank you for this referral.    These services are reasonable and necessary for the conditions set forth above while under my care.

## 2018-02-26 NOTE — TELEPHONE ENCOUNTER
Dr. Souza reviewed your labs.  Called patient to let her know she will need labs in the morning, with US in the evening.  Also will need to come to [place] for a biopsy on Wednesday.  Also sent instructions to patient via portal.

## 2018-02-27 ENCOUNTER — HOSPITAL ENCOUNTER (INPATIENT)
Facility: HOSPITAL | Age: 53
LOS: 3 days | Discharge: HOME | DRG: 443 | End: 2018-03-02
Attending: SURGERY | Admitting: SURGERY
Payer: COMMERCIAL

## 2018-02-27 ENCOUNTER — TELEPHONE (OUTPATIENT)
Dept: TRANSPLANT | Facility: CLINIC | Age: 53
End: 2018-02-27

## 2018-02-27 ENCOUNTER — HOSPITAL ENCOUNTER (OUTPATIENT)
Dept: RADIOLOGY | Facility: HOSPITAL | Age: 53
Discharge: HOME | End: 2018-02-27
Attending: INTERNAL MEDICINE
Payer: COMMERCIAL

## 2018-02-27 DIAGNOSIS — R74.8 ELEVATED LIVER ENZYMES: Primary | ICD-10-CM

## 2018-02-27 DIAGNOSIS — M79.605 PAIN OF LEFT LOWER EXTREMITY: ICD-10-CM

## 2018-02-27 DIAGNOSIS — Z29.89 PROPHYLACTIC IMMUNOTHERAPY: ICD-10-CM

## 2018-02-27 DIAGNOSIS — Z94.4 S/P LIVER TRANSPLANT: ICD-10-CM

## 2018-02-27 DIAGNOSIS — D63.8 ANEMIA OF CHRONIC DISEASE: ICD-10-CM

## 2018-02-27 DIAGNOSIS — Z79.60 LONG-TERM USE OF IMMUNOSUPPRESSANT MEDICATION: ICD-10-CM

## 2018-02-27 DIAGNOSIS — D70.9 NEUTROPENIA, UNSPECIFIED TYPE: ICD-10-CM

## 2018-02-27 DIAGNOSIS — Z94.4 LIVER REPLACED BY TRANSPLANT: ICD-10-CM

## 2018-02-27 DIAGNOSIS — Z91.89 AT RISK FOR OPPORTUNISTIC INFECTIONS: ICD-10-CM

## 2018-02-27 PROCEDURE — 99223 1ST HOSP IP/OBS HIGH 75: CPT | Mod: 24,,, | Performed by: NURSE PRACTITIONER

## 2018-02-27 PROCEDURE — 76705 ECHO EXAM OF ABDOMEN: CPT | Mod: 26,59,, | Performed by: RADIOLOGY

## 2018-02-27 PROCEDURE — 25000003 PHARM REV CODE 250: Performed by: NURSE PRACTITIONER

## 2018-02-27 PROCEDURE — 63600175 PHARM REV CODE 636 W HCPCS: Mod: JG | Performed by: NURSE PRACTITIONER

## 2018-02-27 PROCEDURE — 20600001 HC STEP DOWN PRIVATE ROOM

## 2018-02-27 PROCEDURE — 93975 VASCULAR STUDY: CPT | Mod: TC,PO

## 2018-02-27 PROCEDURE — 93975 VASCULAR STUDY: CPT | Mod: 26,,, | Performed by: RADIOLOGY

## 2018-02-27 PROCEDURE — 76705 ECHO EXAM OF ABDOMEN: CPT | Mod: TC,PO

## 2018-02-27 RX ORDER — FAMOTIDINE 20 MG/1
20 TABLET, FILM COATED ORAL NIGHTLY
Status: DISCONTINUED | OUTPATIENT
Start: 2018-02-27 | End: 2018-03-02 | Stop reason: HOSPADM

## 2018-02-27 RX ORDER — TACROLIMUS 1 MG/1
5 CAPSULE ORAL 2 TIMES DAILY
Status: DISCONTINUED | OUTPATIENT
Start: 2018-02-27 | End: 2018-02-28

## 2018-02-27 RX ORDER — ACETAMINOPHEN 325 MG/1
650 TABLET ORAL EVERY 8 HOURS PRN
Status: DISCONTINUED | OUTPATIENT
Start: 2018-02-27 | End: 2018-03-02 | Stop reason: HOSPADM

## 2018-02-27 RX ORDER — HEPARIN SODIUM 5000 [USP'U]/ML
5000 INJECTION, SOLUTION INTRAVENOUS; SUBCUTANEOUS EVERY 8 HOURS
Status: DISCONTINUED | OUTPATIENT
Start: 2018-02-27 | End: 2018-03-02 | Stop reason: HOSPADM

## 2018-02-27 RX ORDER — ONDANSETRON 8 MG/1
8 TABLET, ORALLY DISINTEGRATING ORAL EVERY 8 HOURS PRN
Status: DISCONTINUED | OUTPATIENT
Start: 2018-02-27 | End: 2018-03-02 | Stop reason: HOSPADM

## 2018-02-27 RX ORDER — HYDROMORPHONE HYDROCHLORIDE 2 MG/1
2 TABLET ORAL EVERY 6 HOURS PRN
Status: DISCONTINUED | OUTPATIENT
Start: 2018-02-27 | End: 2018-03-02 | Stop reason: HOSPADM

## 2018-02-27 RX ORDER — SODIUM CHLORIDE 0.9 % (FLUSH) 0.9 %
3 SYRINGE (ML) INJECTION
Status: DISCONTINUED | OUTPATIENT
Start: 2018-02-27 | End: 2018-03-02 | Stop reason: HOSPADM

## 2018-02-27 RX ORDER — NIFEDIPINE 60 MG/1
60 TABLET, EXTENDED RELEASE ORAL DAILY
Status: DISCONTINUED | OUTPATIENT
Start: 2018-02-28 | End: 2018-03-02 | Stop reason: HOSPADM

## 2018-02-27 RX ORDER — LANOLIN ALCOHOL/MO/W.PET/CERES
400 CREAM (GRAM) TOPICAL 2 TIMES DAILY
Status: DISCONTINUED | OUTPATIENT
Start: 2018-02-27 | End: 2018-03-02 | Stop reason: HOSPADM

## 2018-02-27 RX ADMIN — FAMOTIDINE 20 MG: 20 TABLET, FILM COATED ORAL at 09:02

## 2018-02-27 RX ADMIN — TACROLIMUS 5 MG: 1 CAPSULE ORAL at 06:02

## 2018-02-27 RX ADMIN — HEPARIN SODIUM 5000 UNITS: 5000 INJECTION, SOLUTION INTRAVENOUS; SUBCUTANEOUS at 09:02

## 2018-02-27 RX ADMIN — MAGNESIUM OXIDE TAB 400 MG (241.3 MG ELEMENTAL MG) 400 MG: 400 (241.3 MG) TAB at 09:02

## 2018-02-27 RX ADMIN — TBO-FILGRASTIM 300 MCG: 300 INJECTION, SOLUTION SUBCUTANEOUS at 09:02

## 2018-02-27 RX ADMIN — DEXTROSE: 50 INJECTION, SOLUTION INTRAVENOUS at 09:02

## 2018-02-27 RX ADMIN — TRAZODONE HYDROCHLORIDE 25 MG: 50 TABLET ORAL at 09:02

## 2018-02-27 RX ADMIN — HYDROMORPHONE HYDROCHLORIDE 2 MG: 2 TABLET ORAL at 09:02

## 2018-02-28 LAB
ALBUMIN SERPL BCP-MCNC: 2.4 G/DL
ALP SERPL-CCNC: 1257 U/L
ALT SERPL W/O P-5'-P-CCNC: 330 U/L
ANION GAP SERPL CALC-SCNC: 11 MMOL/L
AST SERPL-CCNC: 295 U/L
BASOPHILS # BLD AUTO: 0.12 K/UL
BASOPHILS NFR BLD: 0.5 %
BILIRUB SERPL-MCNC: 3.1 MG/DL
BUN SERPL-MCNC: 15 MG/DL
CALCIUM SERPL-MCNC: 8.7 MG/DL
CHLORIDE SERPL-SCNC: 104 MMOL/L
CO2 SERPL-SCNC: 20 MMOL/L
CREAT SERPL-MCNC: 1.1 MG/DL
DIFFERENTIAL METHOD: ABNORMAL
EOSINOPHIL # BLD AUTO: 0 K/UL
EOSINOPHIL NFR BLD: 0 %
ERYTHROCYTE [DISTWIDTH] IN BLOOD BY AUTOMATED COUNT: 17.4 %
EST. GFR  (AFRICAN AMERICAN): >60 ML/MIN/1.73 M^2
EST. GFR  (NON AFRICAN AMERICAN): 57.1 ML/MIN/1.73 M^2
GLUCOSE SERPL-MCNC: 182 MG/DL
HCT VFR BLD AUTO: 29.1 %
HGB BLD-MCNC: 9.4 G/DL
IMM GRANULOCYTES # BLD AUTO: 0.25 K/UL
IMM GRANULOCYTES NFR BLD AUTO: 1 %
LYMPHOCYTES # BLD AUTO: 0.6 K/UL
LYMPHOCYTES NFR BLD: 2.6 %
MAGNESIUM SERPL-MCNC: 1.6 MG/DL
MCH RBC QN AUTO: 27 PG
MCHC RBC AUTO-ENTMCNC: 32.3 G/DL
MCV RBC AUTO: 84 FL
MONOCYTES # BLD AUTO: 0.3 K/UL
MONOCYTES NFR BLD: 1.2 %
NEUTROPHILS # BLD AUTO: 23.2 K/UL
NEUTROPHILS NFR BLD: 94.7 %
NRBC BLD-RTO: 0 /100 WBC
PLATELET # BLD AUTO: 184 K/UL
PLATELET BLD QL SMEAR: ABNORMAL
PMV BLD AUTO: 12.2 FL
POCT GLUCOSE: 105 MG/DL (ref 70–110)
POCT GLUCOSE: 127 MG/DL (ref 70–110)
POCT GLUCOSE: 156 MG/DL (ref 70–110)
POCT GLUCOSE: 156 MG/DL (ref 70–110)
POTASSIUM SERPL-SCNC: 3.9 MMOL/L
PROT SERPL-MCNC: 6.1 G/DL
RBC # BLD AUTO: 3.48 M/UL
SODIUM SERPL-SCNC: 135 MMOL/L
TACROLIMUS BLD-MCNC: 8.3 NG/ML
WBC # BLD AUTO: 24.48 K/UL

## 2018-02-28 PROCEDURE — 83735 ASSAY OF MAGNESIUM: CPT

## 2018-02-28 PROCEDURE — 88307 TISSUE EXAM BY PATHOLOGIST: CPT | Performed by: PATHOLOGY

## 2018-02-28 PROCEDURE — 88307 TISSUE EXAM BY PATHOLOGIST: CPT | Mod: 26,,, | Performed by: PATHOLOGY

## 2018-02-28 PROCEDURE — 25000003 PHARM REV CODE 250: Performed by: PHYSICIAN ASSISTANT

## 2018-02-28 PROCEDURE — 80197 ASSAY OF TACROLIMUS: CPT

## 2018-02-28 PROCEDURE — 88313 SPECIAL STAINS GROUP 2: CPT | Mod: 26,,, | Performed by: PATHOLOGY

## 2018-02-28 PROCEDURE — 63600175 PHARM REV CODE 636 W HCPCS: Performed by: NURSE PRACTITIONER

## 2018-02-28 PROCEDURE — 36415 COLL VENOUS BLD VENIPUNCTURE: CPT

## 2018-02-28 PROCEDURE — 20600001 HC STEP DOWN PRIVATE ROOM

## 2018-02-28 PROCEDURE — 85025 COMPLETE CBC W/AUTO DIFF WBC: CPT

## 2018-02-28 PROCEDURE — 63600175 PHARM REV CODE 636 W HCPCS: Performed by: RADIOLOGY

## 2018-02-28 PROCEDURE — 88333 PATH CONSLTJ SURG CYTO XM 1: CPT | Mod: 26,,, | Performed by: PATHOLOGY

## 2018-02-28 PROCEDURE — 80053 COMPREHEN METABOLIC PANEL: CPT

## 2018-02-28 PROCEDURE — 99233 SBSQ HOSP IP/OBS HIGH 50: CPT | Mod: 24,,, | Performed by: PHYSICIAN ASSISTANT

## 2018-02-28 PROCEDURE — 25000003 PHARM REV CODE 250: Performed by: NURSE PRACTITIONER

## 2018-02-28 PROCEDURE — 63600175 PHARM REV CODE 636 W HCPCS: Performed by: PHYSICIAN ASSISTANT

## 2018-02-28 PROCEDURE — 0FB13ZX EXCISION OF RIGHT LOBE LIVER, PERCUTANEOUS APPROACH, DIAGNOSTIC: ICD-10-PCS | Performed by: RADIOLOGY

## 2018-02-28 RX ORDER — GABAPENTIN 300 MG/1
300 CAPSULE ORAL 2 TIMES DAILY
Status: DISCONTINUED | OUTPATIENT
Start: 2018-02-28 | End: 2018-03-02 | Stop reason: HOSPADM

## 2018-02-28 RX ORDER — IBUPROFEN 200 MG
24 TABLET ORAL
Status: DISCONTINUED | OUTPATIENT
Start: 2018-02-28 | End: 2018-03-02 | Stop reason: HOSPADM

## 2018-02-28 RX ORDER — MIDAZOLAM HYDROCHLORIDE 1 MG/ML
INJECTION INTRAMUSCULAR; INTRAVENOUS CODE/TRAUMA/SEDATION MEDICATION
Status: COMPLETED | OUTPATIENT
Start: 2018-02-28 | End: 2018-02-28

## 2018-02-28 RX ORDER — FENTANYL CITRATE 50 UG/ML
INJECTION, SOLUTION INTRAMUSCULAR; INTRAVENOUS CODE/TRAUMA/SEDATION MEDICATION
Status: COMPLETED | OUTPATIENT
Start: 2018-02-28 | End: 2018-02-28

## 2018-02-28 RX ORDER — TACROLIMUS 1 MG/1
6 CAPSULE ORAL 2 TIMES DAILY
Status: DISCONTINUED | OUTPATIENT
Start: 2018-02-28 | End: 2018-03-01

## 2018-02-28 RX ORDER — INSULIN ASPART 100 [IU]/ML
0-5 INJECTION, SOLUTION INTRAVENOUS; SUBCUTANEOUS
Status: DISCONTINUED | OUTPATIENT
Start: 2018-02-28 | End: 2018-03-02 | Stop reason: HOSPADM

## 2018-02-28 RX ORDER — NYSTATIN 100000 [USP'U]/ML
500000 SUSPENSION ORAL
Status: DISCONTINUED | OUTPATIENT
Start: 2018-02-28 | End: 2018-03-02 | Stop reason: HOSPADM

## 2018-02-28 RX ORDER — GLUCAGON 1 MG
1 KIT INJECTION
Status: DISCONTINUED | OUTPATIENT
Start: 2018-02-28 | End: 2018-03-02 | Stop reason: HOSPADM

## 2018-02-28 RX ORDER — IBUPROFEN 200 MG
16 TABLET ORAL
Status: DISCONTINUED | OUTPATIENT
Start: 2018-02-28 | End: 2018-03-02 | Stop reason: HOSPADM

## 2018-02-28 RX ADMIN — NIFEDIPINE 60 MG: 60 TABLET, FILM COATED, EXTENDED RELEASE ORAL at 07:02

## 2018-02-28 RX ADMIN — GABAPENTIN 300 MG: 300 CAPSULE ORAL at 09:02

## 2018-02-28 RX ADMIN — FENTANYL CITRATE 50 MCG: 50 INJECTION, SOLUTION INTRAMUSCULAR; INTRAVENOUS at 08:02

## 2018-02-28 RX ADMIN — ONDANSETRON 8 MG: 8 TABLET, ORALLY DISINTEGRATING ORAL at 05:02

## 2018-02-28 RX ADMIN — HEPARIN SODIUM 5000 UNITS: 5000 INJECTION, SOLUTION INTRAVENOUS; SUBCUTANEOUS at 01:02

## 2018-02-28 RX ADMIN — MIDAZOLAM HYDROCHLORIDE 1 MG: 1 INJECTION, SOLUTION INTRAMUSCULAR; INTRAVENOUS at 08:02

## 2018-02-28 RX ADMIN — TACROLIMUS 6 MG: 1 CAPSULE ORAL at 05:02

## 2018-02-28 RX ADMIN — TRAZODONE HYDROCHLORIDE 25 MG: 50 TABLET ORAL at 09:02

## 2018-02-28 RX ADMIN — DEXTROSE: 50 INJECTION, SOLUTION INTRAVENOUS at 05:02

## 2018-02-28 RX ADMIN — FAMOTIDINE 20 MG: 20 TABLET, FILM COATED ORAL at 09:02

## 2018-02-28 RX ADMIN — TACROLIMUS 5 MG: 1 CAPSULE ORAL at 07:02

## 2018-02-28 RX ADMIN — MAGNESIUM OXIDE TAB 400 MG (241.3 MG ELEMENTAL MG) 400 MG: 400 (241.3 MG) TAB at 07:02

## 2018-02-28 RX ADMIN — HEPARIN SODIUM 5000 UNITS: 5000 INJECTION, SOLUTION INTRAVENOUS; SUBCUTANEOUS at 09:02

## 2018-02-28 RX ADMIN — NYSTATIN 500000 UNITS: 500000 SUSPENSION ORAL at 03:02

## 2018-02-28 RX ADMIN — NYSTATIN 500000 UNITS: 500000 SUSPENSION ORAL at 09:02

## 2018-02-28 RX ADMIN — MAGNESIUM OXIDE TAB 400 MG (241.3 MG ELEMENTAL MG) 400 MG: 400 (241.3 MG) TAB at 09:02

## 2018-02-28 RX ADMIN — ACETAMINOPHEN 650 MG: 325 TABLET ORAL at 05:02

## 2018-02-28 NOTE — SUBJECTIVE & OBJECTIVE
Past Medical History:   Diagnosis Date    Alcohol abuse     Alcoholic hepatitis     Anemia of chronic disease     Coagulopathy     End stage liver disease     Hypertension     Hyponatremia     Liver cirrhosis     Liver transplant candidate     Obesity (BMI 30-39.9) 1/5/2016       Past Surgical History:   Procedure Laterality Date    BREAST BIOPSY      CHOLECYSTECTOMY      COLONOSCOPY N/A 12/1/2017    Procedure: COLONOSCOPY;  Surgeon: Filemon Fuentes MD;  [facility]  Service: Endoscopy;  Laterality: N/A;    COLONOSCOPY N/A 12/5/2017    Procedure: COLONOSCOPY;  Surgeon: José Chavira MD;  [facility]  Service: Endoscopy;  Laterality: N/A;    HYSTERECTOMY      LIVER TRANSPLANT  12/2017       Review of patient's allergies indicates:   Allergen Reactions    Ferrous sulfate Other (See Comments)     Patient states the pill makes her sick. She stated she would rather have a shot       Family History     Problem Relation (Age of Onset)    Breast cancer Paternal Aunt    Depression Maternal Grandfather    Diabetes Father, Sister    Hypertension Mother, Father        Social History Main Topics    Smoking status: Never Smoker    Smokeless tobacco: Never Used    Alcohol use No      Comment: Currently admitted to inpatient rehab    Drug use: No    Sexual activity: Not Currently       PTA Medications   Medication Sig    famotidine (PEPCID) 20 MG tablet Take 1 tablet (20 mg total) by mouth every evening.    HYDROmorphone (DILAUDID) 4 MG tablet Take 0.5-1 tablets (2-4 mg total) by mouth every 6 (six) hours as needed.    magnesium oxide (MAG-OX) 400 mg tablet Take 1 tablet (400 mg total) by mouth 2 (two) times daily.    NIFEdipine (PROCARDIA-XL) 60 MG (OSM) 24 hr tablet Take 1 tablet (60 mg total) by mouth once daily.    tacrolimus (PROGRAF) 1 MG Cap Take 5 mg PO QAM and 4 mg PO QPM    traZODone (DESYREL) 50 MG tablet Take 0.5 tablets (25 mg total) by mouth every  evening.    valGANciclovir (VALCYTE) 450 mg Tab Take 1 tablet (450 mg total) by mouth once daily. STOP 3/26/18    aspirin (ECOTRIN) 81 MG EC tablet Take 1 tablet (81 mg total) by mouth once daily.    bisacodyl (DULCOLAX) 5 mg EC tablet Take 1-2 tablets (5-10 mg total) by mouth daily as needed for Constipation.    ondansetron (ZOFRAN-ODT) 4 MG TbDL DISSOLVE 1 TABLET BY MOUTH EVERY 8 HOURS AS NEEDED FOR NAUSEA AND VOMITTING       Review of Systems   Constitutional: Negative for appetite change, chills, fatigue and fever.   Respiratory: Negative for cough, chest tightness and shortness of breath.    Cardiovascular: Negative for chest pain, palpitations and leg swelling.   Gastrointestinal: Negative for abdominal distention, abdominal pain, constipation, diarrhea, nausea and vomiting.   Genitourinary: Negative for difficulty urinating, dysuria, frequency and urgency.   Musculoskeletal: Positive for myalgias (left leg pain). Negative for back pain and neck pain.   Skin: Negative for color change, pallor, rash and wound.   Allergic/Immunologic: Positive for immunocompromised state.   Neurological: Negative for dizziness, weakness and headaches.   Psychiatric/Behavioral: Negative for confusion and sleep disturbance.     Objective:     Vital Signs (Most Recent):  Temp: 98.9 °F (37.2 °C) (02/27/18 1730)  Pulse: 92 (02/27/18 1730)  Resp: 16 (02/27/18 1730)  BP: 138/87 (02/27/18 1730)  SpO2: 100 % (02/27/18 1730) Vital Signs (24h Range):  Temp:  [98.9 °F (37.2 °C)] 98.9 °F (37.2 °C)  Pulse:  [92] 92  Resp:  [16] 16  SpO2:  [100 %] 100 %  BP: (138)/(87) 138/87     Weight: 53.7 kg (118 lb 6.2 oz)  Body mass index is 20.97 kg/m².    No intake or output data in the 24 hours ending 02/27/18 1920    Physical Exam   Constitutional: She is oriented to person, place, and time. She is active and cooperative.   Eyes: Pupils are equal, round, and reactive to light.   Cardiovascular: Normal rate, regular rhythm, normal heart sounds,  intact distal pulses and normal pulses.    No murmur heard.  Pulmonary/Chest: Effort normal and breath sounds normal. No respiratory distress. She has no decreased breath sounds. She has no wheezes. She has no rales.   Abdominal: Soft. Normal appearance and bowel sounds are normal. She exhibits distension. There is no tenderness. There is no guarding.   Chevron incision well healed.   Musculoskeletal: Normal range of motion. She exhibits no edema.   Neurological: She is alert and oriented to person, place, and time.   Skin: Skin is warm, dry and intact.   Nursing note and vitals reviewed.      Laboratory:  CBC:   Recent Labs  Lab 02/27/18  0750   WBC 1.55*   RBC 3.80*   HGB 10.3*   HCT 31.8*      MCV 84   MCH 27.1   MCHC 32.4     CMP:   Recent Labs  Lab 02/27/18  0750      CALCIUM 9.5   ALBUMIN 3.1*   PROT 6.9      K 4.1   CO2 23      BUN 15   CREATININE 1.1   ALKPHOS 1,277*   *   *   BILITOT 2.6*     Coagulation:   Recent Labs  Lab 02/27/18  0750   INR 1.1     Labs within the past 24 hours have been reviewed.    Diagnostic Results:  I have personally reviewed all pertinent imaging studies.   Liver US 2/27:  Findings: Patient is status post liver transplant.  The liver allograft measures 14.5cm.  Hepatic parenchyma is homogeneous without evidence for mass.  No intrahepatic biliary ductal dilatation. The common bile duct measures 4.9 mm. No ascites or peritransplant fluid collections.    Color Doppler images demonstrate appropriate flow in the main portal vein and its branches, all 3 hepatic veins, and the IVC.  The main hepatic artery is patent without tardus parvus waveform.  Hepatic artery velocities and resistive indices are decreased since prior and as follows:   Main hepatic artery: Velocity is 71 cm/s with resistive index of 0.58. Previously the resistive index measured 0.81.,  Left hepatic artery: Velocity 68 cm per sec and resistive index of 0.60. Previously the  resistive index measured 0.84.,    Right hepatic artery, anterior: Velocity of 38 cm/s resistive index of 0.51. Previously the resistive index measured 0.67,   Right hepatic artery, posterior: Velocity of 78 cm/s and just index of 0.59. Previously resistive index measured 0.74.   Impression    Previously demonstrated small peritransplant fluid collections are no longer visualized. Interval decrease in hepatic arterial resistive indices since the exam from 01/22/18. Please see above for details.

## 2018-02-28 NOTE — PROGRESS NOTES
Pt sent down to IR for liver biopsy.  Consent obtained at bedside before leaving unit.  VSS.  Pt NPO since midnight.  No issues noted.  Will monitor for pts return to the unit.

## 2018-02-28 NOTE — SUBJECTIVE & OBJECTIVE
Scheduled Meds:   famotidine  20 mg Oral QHS    heparin (porcine)  5,000 Units Subcutaneous Q8H    magnesium oxide  400 mg Oral BID    methylPREDNISolone (SOLU-Medrol) IVPB (doses > 250 mg)   Intravenous Daily    NIFEdipine  60 mg Oral Daily    nystatin  500,000 Units Oral TID PC    tacrolimus  5 mg Oral BID    traZODone  25 mg Oral QHS     Continuous Infusions:  PRN Meds:acetaminophen, dextrose 50%, dextrose 50%, glucagon (human recombinant), glucose, glucose, HYDROmorphone, insulin aspart U-100, ondansetron, sodium chloride 0.9%    Review of Systems   Constitutional: Negative for activity change, appetite change, chills and fever.   Respiratory: Negative for cough, shortness of breath and wheezing.    Cardiovascular: Negative for leg swelling.   Gastrointestinal: Negative for abdominal distention and abdominal pain.   Genitourinary: Negative for decreased urine volume, difficulty urinating, dysuria and hematuria.   Allergic/Immunologic: Positive for immunocompromised state.   Neurological: Positive for weakness (LLE).   Psychiatric/Behavioral: Negative for agitation, confusion and decreased concentration.     Objective:     Vital Signs (Most Recent):  Temp: 97.9 °F (36.6 °C) (02/28/18 0900)  Pulse: 78 (02/28/18 1000)  Resp: 16 (02/28/18 1000)  BP: 119/80 (02/28/18 1000)  SpO2: 99 % (02/28/18 1000) Vital Signs (24h Range):  Temp:  [97.9 °F (36.6 °C)-99 °F (37.2 °C)] 97.9 °F (36.6 °C)  Pulse:  [77-92] 78  Resp:  [15-20] 16  SpO2:  [97 %-100 %] 99 %  BP: (118-138)/(72-88) 119/80     Weight: 53.7 kg (118 lb 6.4 oz)  Body mass index is 20.97 kg/m².    Intake/Output - Last 3 Shifts       02/26 0700 - 02/27 0659 02/27 0700 - 02/28 0659 02/28 0700 - 03/01 0659    IV Piggyback  100     Total Intake(mL/kg)  100 (1.9)     Net   +100             Urine Occurrence  1 x     Stool Occurrence  1 x           Physical Exam   Constitutional: She is oriented to person, place, and time. She appears well-developed and  well-nourished. No distress.   Cardiovascular: Normal rate and regular rhythm.  Exam reveals no friction rub.    No murmur heard.  Pulmonary/Chest: Effort normal and breath sounds normal. She has no wheezes. She has no rales.   Abdominal: Soft. She exhibits no distension. There is no tenderness.   Musculoskeletal: She exhibits tenderness (LLE).   Neurological: She is alert and oriented to person, place, and time.   Skin: She is not diaphoretic.   Psychiatric: She has a normal mood and affect. Her behavior is normal. Judgment and thought content normal.   Nursing note and vitals reviewed.      Laboratory:  Immunosuppressants         Stop Route Frequency     tacrolimus capsule 5 mg      -- Oral 2 times daily        CBC:   Recent Labs  Lab 02/28/18  0531   WBC 24.48*   RBC 3.48*   HGB 9.4*   HCT 29.1*      MCV 84   MCH 27.0   MCHC 32.3     CMP:   Recent Labs  Lab 02/28/18  0531   *   CALCIUM 8.7   ALBUMIN 2.4*   PROT 6.1   *   K 3.9   CO2 20*      BUN 15   CREATININE 1.1   ALKPHOS 1,257*   *   *   BILITOT 3.1*     Labs within the past 24 hours have been reviewed.    Diagnostic Results:  US Liver Transplant Post:  No results found for this or any previous visit.

## 2018-02-28 NOTE — PROGRESS NOTES
[facility]  Liver Transplant  Progress Note    Patient Name: Marcie Bazan  MRN: [id number]  Admission Date: 2018  Hospital Length of Stay: 1 days  Code Status: Full Code  Primary Care Provider: ODILIA Wall  Post-Operative Day: 64    ORGAN:   LIVER  Disease Etiology: Alcoholic Cirrhosis  Donor Type:    - Brain Death  CDC High Risk:   No  Donor CMV Status:   Donor CMV Status: Negative  Donor HBcAB:   Negative  Donor HCV Status:   Negative  Whole or Partial: Whole Liver  Biliary Anastomosis: End to End  Arterial Anatomy: Standard  Subjective:     History of Present Illness:  Ms. Marcie Bazan is a 52 y.o. 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 Post operative course complicated by hematoma evac on 18 and neutropenia requiring multiple doses of neupogen. Cellcept, bactrim, and valcyte have been on hold 2/2 neutropenia. She was admitted for elevated LFT's suspicious for rejection. Liver US  was satisfactory without CBD dilatation. Upon admission, her , and she was given 1 dose of neupogen. She will be empirically treated with steroid pulse with plan for liver biopsy. Of note, she c/o left leg pain since transplant, pain is related to severe sensitivity to anything touching her leg. In addition, she has been seeing PT for leg pain which causes her to drag her foot at times. PT consulted.       Hospital Course:  Interval History: No acute events overnight. Patient feeling okay this morning. Tolerated SM #1 last night. LFTs remain elevated. Liver biopsy performed this AM, results pending. Will proceed with second dose of SM today while awaiting results of biopsy. Monitor.     Scheduled Meds:   famotidine  20 mg Oral QHS    heparin (porcine)  5,000 Units Subcutaneous Q8H    magnesium oxide  400 mg Oral BID    methylPREDNISolone (SOLU-Medrol) IVPB (doses > 250 mg)   Intravenous Daily    NIFEdipine  60 mg Oral  Daily    nystatin  500,000 Units Oral TID PC    tacrolimus  5 mg Oral BID    traZODone  25 mg Oral QHS     Continuous Infusions:  PRN Meds:acetaminophen, dextrose 50%, dextrose 50%, glucagon (human recombinant), glucose, glucose, HYDROmorphone, insulin aspart U-100, ondansetron, sodium chloride 0.9%    Review of Systems   Constitutional: Negative for activity change, appetite change, chills and fever.   Respiratory: Negative for cough, shortness of breath and wheezing.    Cardiovascular: Negative for leg swelling.   Gastrointestinal: Negative for abdominal distention and abdominal pain.   Genitourinary: Negative for decreased urine volume, difficulty urinating, dysuria and hematuria.   Allergic/Immunologic: Positive for immunocompromised state.   Neurological: Positive for weakness (LLE).   Psychiatric/Behavioral: Negative for agitation, confusion and decreased concentration.     Objective:     Vital Signs (Most Recent):  Temp: 97.9 °F (36.6 °C) (02/28/18 0900)  Pulse: 78 (02/28/18 1000)  Resp: 16 (02/28/18 1000)  BP: 119/80 (02/28/18 1000)  SpO2: 99 % (02/28/18 1000) Vital Signs (24h Range):  Temp:  [97.9 °F (36.6 °C)-99 °F (37.2 °C)] 97.9 °F (36.6 °C)  Pulse:  [77-92] 78  Resp:  [15-20] 16  SpO2:  [97 %-100 %] 99 %  BP: (118-138)/(72-88) 119/80     Weight: 53.7 kg (118 lb 6.4 oz)  Body mass index is 20.97 kg/m².    Intake/Output - Last 3 Shifts       02/26 0700 - 02/27 0659 02/27 0700 - 02/28 0659 02/28 0700 - 03/01 0659    IV Piggyback  100     Total Intake(mL/kg)  100 (1.9)     Net   +100             Urine Occurrence  1 x     Stool Occurrence  1 x           Physical Exam   Constitutional: She is oriented to person, place, and time. She appears well-developed and well-nourished. No distress.   Cardiovascular: Normal rate and regular rhythm.  Exam reveals no friction rub.    No murmur heard.  Pulmonary/Chest: Effort normal and breath sounds normal. She has no wheezes. She has no rales.   Abdominal: Soft. She  exhibits no distension. There is no tenderness.   Musculoskeletal: She exhibits tenderness (LLE).   Neurological: She is alert and oriented to person, place, and time.   Skin: She is not diaphoretic.   Psychiatric: She has a normal mood and affect. Her behavior is normal. Judgment and thought content normal.   Nursing note and vitals reviewed.      Laboratory:  Immunosuppressants         Stop Route Frequency     tacrolimus capsule 5 mg      -- Oral 2 times daily        CBC:   Recent Labs  Lab 02/28/18  0531   WBC 24.48*   RBC 3.48*   HGB 9.4*   HCT 29.1*      MCV 84   MCH 27.0   MCHC 32.3     CMP:   Recent Labs  Lab 02/28/18  0531   *   CALCIUM 8.7   ALBUMIN 2.4*   PROT 6.1   *   K 3.9   CO2 20*      BUN 15   CREATININE 1.1   ALKPHOS 1,257*   *   *   BILITOT 3.1*     Labs within the past 24 hours have been reviewed.    Diagnostic Results:  US Liver Transplant Post:  No results found for this or any previous visit.    Assessment/Plan:     * Elevated liver enzymes    - admitted for elevated LFTs likely 2/2 rejection  - Liver US 2/27 unremarkable  - Liver biopsy this AM, results pending  - Tolerated first dose of SM yesterday 2/28. Proceed with SM #2 today while awaiting biopsy results.         Pain of left lower extremity    - chronic since transplant, continue pain meds PRN  - PT ordered for eval        Long-term use of immunosuppressant medication    - Maintenance IS with prograf, cellcept on hold 2/2 neutropenia. cont to check prograf level daily. Assess for toxicity and adjust level as needed        Neutropenia    - chronic since transplant requiring multiple doses of neupogen  -  on admission  - neupogen given x 1 on admit 2/27. Now with leukocytosis likely from SM and Neupogen. Cont to monitor.         At risk for opportunistic infections    - valcyte on hold for neutropenia, weekly CMV PCR's, most recent 2/19 undetected  - CMV PCR 2/27 pending.  - pentamidine for  PCP prophylaxis (bactrim held 2/2 neutropenia), last dose 2/14.        Prophylactic immunotherapy    - see long term use of immunosuppressant medication        Liver transplant 12/26/2017 for ETOH    - see elevated LFTs        Leukocytosis    With leukocytosis on AM labs. No fevers. Likely due to Neupogen and steroids. Will continue to monitor.           Anemia of chronic disease    H/H stable. Cont to monitor with daily labs.               VTE Risk Mitigation         Ordered     heparin (porcine) injection 5,000 Units  Every 8 hours     Route:  Subcutaneous        02/27/18 1805     Medium Risk of VTE  Once      02/27/18 1805     Place sequential compression device  Until discontinued      02/27/18 1805          The patients clinical status was discussed at multidisplinary rounds, involving transplant surgery, transplant medicine, pharmacy, nursing, nutrition, and social work    Discharge Planning: Not a candidate for discharge at this time.   No Patient Care Coordination Note on file.      Rayne Currie PA-C  Liver Transplant  [facility]

## 2018-02-28 NOTE — H&P
[facility]  Liver Transplant  History & Physical    Patient Name: Marcie Bazan  MRN: [id number]  Admission Date: 2018  Code Status: Full Code  Primary Care Provider: ODILIA Wall  Post-Operative Day: 63     ORGAN:   LIVER  Disease Etiology: Alcoholic Cirrhosis  Donor Type:    - Brain Death  [facility] High Risk:   No  Donor CMV Status:   Donor CMV Status: Negative  Donor HBcAB:   Negative  Donor HCV Status:   Negative  Whole or Partial: Whole Liver  Biliary Anastomosis: End to End  Arterial Anatomy: Standard    Subjective:     History of Present Illness:  Marcie Bazan is a 52 y.o. 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 Post operative course complicated by hematoma evac on 18 and neutropenia requiring multiple doses of neupogen. Cellcept, bactrim, and valcyte have been on hold 2/2 neutropenia. She is being admitted for elevated LFT's suspicious for rejection. Liver US  was satisfactory without CBD dilatation. Upon admission, her , and she was given 1 dose of neupogen. She will be empirically treated with steroid pulse with liver biopsy in AM. She feels well otherwise. Of note, she c/o left leg pain since transplant, pain is related to severe sensitivity to anything touching her leg. In addition, she has been seeing PT for leg pain which causes her to drag her foot at times.      Past Medical History:   Diagnosis Date    Alcohol abuse     Alcoholic hepatitis     Anemia of chronic disease     Coagulopathy     End stage liver disease     Hypertension     Hyponatremia     Liver cirrhosis     Liver transplant candidate     Obesity (BMI 30-39.9) 2016       Past Surgical History:   Procedure Laterality Date    BREAST BIOPSY      CHOLECYSTECTOMY      COLONOSCOPY N/A 2017    Procedure: COLONOSCOPY;  Surgeon: Filemon Fuentes MD;  Location: [facility] ([address] [place] [address]);  Service: Endoscopy;  Laterality: N/A;     COLONOSCOPY N/A 12/5/2017    Procedure: COLONOSCOPY;  Surgeon: José Chavira MD;  Location: [facility] ([address] [place] [address]);  Service: Endoscopy;  Laterality: N/A;    HYSTERECTOMY      LIVER TRANSPLANT  12/2017       Review of patient's allergies indicates:   Allergen Reactions    Ferrous sulfate Other (See Comments)     Patient states the pill makes her sick. She stated she would rather have a shot       Family History     Problem Relation (Age of Onset)    Breast cancer Paternal Aunt    Depression Maternal Grandfather    Diabetes Father, Sister    Hypertension Mother, Father        Social History Main Topics    Smoking status: Never Smoker    Smokeless tobacco: Never Used    Alcohol use No      Comment: Currently admitted to inpatient rehab    Drug use: No    Sexual activity: Not Currently       PTA Medications   Medication Sig    famotidine (PEPCID) 20 MG tablet Take 1 tablet (20 mg total) by mouth every evening.    HYDROmorphone (DILAUDID) 4 MG tablet Take 0.5-1 tablets (2-4 mg total) by mouth every 6 (six) hours as needed.    magnesium oxide (MAG-OX) 400 mg tablet Take 1 tablet (400 mg total) by mouth 2 (two) times daily.    NIFEdipine (PROCARDIA-XL) 60 MG (OSM) 24 hr tablet Take 1 tablet (60 mg total) by mouth once daily.    tacrolimus (PROGRAF) 1 MG Cap Take 5 mg PO QAM and 4 mg PO QPM    traZODone (DESYREL) 50 MG tablet Take 0.5 tablets (25 mg total) by mouth every evening.    valGANciclovir (VALCYTE) 450 mg Tab Take 1 tablet (450 mg total) by mouth once daily. STOP 3/26/18    aspirin (ECOTRIN) 81 MG EC tablet Take 1 tablet (81 mg total) by mouth once daily.    bisacodyl (DULCOLAX) 5 mg EC tablet Take 1-2 tablets (5-10 mg total) by mouth daily as needed for Constipation.    ondansetron (ZOFRAN-ODT) 4 MG TbDL DISSOLVE 1 TABLET BY MOUTH EVERY 8 HOURS AS NEEDED FOR NAUSEA AND VOMITTING       Review of Systems   Constitutional: Negative for appetite change, chills, fatigue and fever.   Respiratory:  Negative for cough, chest tightness and shortness of breath.    Cardiovascular: Negative for chest pain, palpitations and leg swelling.   Gastrointestinal: Negative for abdominal distention, abdominal pain, constipation, diarrhea, nausea and vomiting.   Genitourinary: Negative for difficulty urinating, dysuria, frequency and urgency.   Musculoskeletal: Positive for myalgias (left leg pain). Negative for back pain and neck pain.   Skin: Negative for color change, pallor, rash and wound.   Allergic/Immunologic: Positive for immunocompromised state.   Neurological: Negative for dizziness, weakness and headaches.   Psychiatric/Behavioral: Negative for confusion and sleep disturbance.     Objective:     Vital Signs (Most Recent):  Temp: 98.9 °F (37.2 °C) (02/27/18 1730)  Pulse: 92 (02/27/18 1730)  Resp: 16 (02/27/18 1730)  BP: 138/87 (02/27/18 1730)  SpO2: 100 % (02/27/18 1730) Vital Signs (24h Range):  Temp:  [98.9 °F (37.2 °C)] 98.9 °F (37.2 °C)  Pulse:  [92] 92  Resp:  [16] 16  SpO2:  [100 %] 100 %  BP: (138)/(87) 138/87     Weight: 53.7 kg (118 lb 6.2 oz)  Body mass index is 20.97 kg/m².    No intake or output data in the 24 hours ending 02/27/18 1920    Physical Exam   Constitutional: She is oriented to person, place, and time. She is active and cooperative.   Eyes: Pupils are equal, round, and reactive to light.   Cardiovascular: Normal rate, regular rhythm, normal heart sounds, intact distal pulses and normal pulses.    No murmur heard.  Pulmonary/Chest: Effort normal and breath sounds normal. No respiratory distress. She has no decreased breath sounds. She has no wheezes. She has no rales.   Abdominal: Soft. Normal appearance and bowel sounds are normal. She exhibits distension. There is no tenderness. There is no guarding.   Chevron incision well healed.   Musculoskeletal: Normal range of motion. She exhibits no edema.   Neurological: She is alert and oriented to person, place, and time.   Skin: Skin is warm,  dry and intact.   Nursing note and vitals reviewed.      Laboratory:  CBC:   Recent Labs  Lab 02/27/18  0750   WBC 1.55*   RBC 3.80*   HGB 10.3*   HCT 31.8*      MCV 84   MCH 27.1   MCHC 32.4     CMP:   Recent Labs  Lab 02/27/18  0750      CALCIUM 9.5   ALBUMIN 3.1*   PROT 6.9      K 4.1   CO2 23      BUN 15   CREATININE 1.1   ALKPHOS 1,277*   *   *   BILITOT 2.6*     Coagulation:   Recent Labs  Lab 02/27/18  0750   INR 1.1     Labs within the past 24 hours have been reviewed.    Diagnostic Results:  I have personally reviewed all pertinent imaging studies.   Liver US 2/27:  Findings: Patient is status post liver transplant.  The liver allograft measures 14.5cm.  Hepatic parenchyma is homogeneous without evidence for mass.  No intrahepatic biliary ductal dilatation. The common bile duct measures 4.9 mm. No ascites or peritransplant fluid collections.    Color Doppler images demonstrate appropriate flow in the main portal vein and its branches, all 3 hepatic veins, and the IVC.  The main hepatic artery is patent without tardus parvus waveform.  Hepatic artery velocities and resistive indices are decreased since prior and as follows:   Main hepatic artery: Velocity is 71 cm/s with resistive index of 0.58. Previously the resistive index measured 0.81.,  Left hepatic artery: Velocity 68 cm per sec and resistive index of 0.60. Previously the resistive index measured 0.84.,    Right hepatic artery, anterior: Velocity of 38 cm/s resistive index of 0.51. Previously the resistive index measured 0.67,   Right hepatic artery, posterior: Velocity of 78 cm/s and just index of 0.59. Previously resistive index measured 0.74.   Impression    Previously demonstrated small peritransplant fluid collections are no longer visualized. Interval decrease in hepatic arterial resistive indices since the exam from 01/22/18. Please see above for details.         Assessment/Plan:     * Elevated liver  enzymes    - admitted for elevated LFTs likely 2/2 rejection  - Liver US 2/27 unremarkable  - NPO after midnight for transjugular liver biopsy in AM  - First dose SMP tonight        Neutropenia    - chronic since transplant requiring multiple doses of neupogen  -  on admission  - neupogen given x 1 today        Liver transplant 12/26/2017 for ETOH    - see elevated LFTs        Pain of left lower extremity    - chronic since transplant, continue pain meds PRN  - PT ordered for eval        Long-term use of immunosuppressant medication    - Maintenance IS with prograf, cellcept on hold 2/2 neutropenia. cont to check prograf level daily. Assess for toxicity and adjust level as needed        At risk for opportunistic infections    - valcyte on hold for neutropenia, weekly CMV PCR's, most recent 2/19 undetected  - CMV PCR 2/27 pending.  - pentamidine for PCP prophylaxis (bactrim held 2/2 neutropenia), last dose 2/14.        Prophylactic immunotherapy    - see long term use of immunosuppressant medication            Mayte Milton, STEVE  Liver Transplant  [facility]

## 2018-02-28 NOTE — PROGRESS NOTES
Admit Note     Met with patient to assess needs. Patient is a 52 y.o. single female, admitted for elevated liver enzymes pre medical record.  Pt is s/p liver transplant from 12/26/2017. Pt reports that she is living between her daughter's home and her home located at [address] [place] [address] until she feels well enough to be on her own.     Patient admitted from home on 2/27/2018 .  At this time, patient presents as alert and oriented x 4, pleasant, good eye contact, well groomed, recall good, concentration/judgement good, average intelligence, calm, communicative, cooperative and asking and answering questions appropriately.  At this time, patients caregiver not present.    Household/Family Systems     Patient resides with patient's daughter and three grandchildren, at [address] [place] [address].  Support system includes pt's adult children Zaire and Reignaldo and siblings.  Patient does not have dependents that are need of being cared for.     Patients primary caregiver is Zaire Bazan, patients son, and daughter, Viola Bazan phone number [phone] (Zaire) and [phone] (Segundo) .  Confirmed patients contact information is [phone] (home);     Telephone Information:   Mobile [phone]       During admission, patient's caregiver plans to stay in patient's room.  Confirmed patient and patients caregivers do have access to reliable transportation.    Cognitive Status/Learning     Patient reports reading ability as college and states patient does have difficulty with vision and wears prescription glasses. Patient reports patient learns best by verbal information.   Needed: No.   Highest education level: Associate/Bachelor Degree    Vocation/Disability     Working for Income: No  If no, reason not working: Demands of Treatment  Patient is physically unable to work due to her medical issues and is in process of appealing her disability claim. Pt reported she  went to court and the court system has given pt 30 days to present most recent medical records to her . Pt reported she is planning to go to the Release of Information office to retrieve her medical records and send to her  directly. SW also provided number for Release of Information.. Pt reports that she will update SW team once she finds out the outcome of her disability.     Adherence     Patient reports a high level of adherence to patients health care regimen.  Adherence counseling and education provided. Patient verbalizes understanding.    Substance Use    Patient reports the following substance usage.    Tobacco: none, patient denies any use.  Alcohol: none, patient denies any use. Pt denies any use since pt became acutely ill in October 2017. Pt enrolled in an inpatient program at [facility] located at [address] [place] [address] [place] [address] (ph#[phone]). Pt reported she was enrolled there in October and was about 2 weeks from graduation. Pt was unable to complete before transplant due to being too ill. Pt reports that once she has improved physical strength she will enroll in the outpatient program and reports that she has already been in touch with the . Pt reported she has not felt well enough to sit up for several hours during the day due to abdominal and leg pain. Pt also reported she would like to enroll when she is able to drive and it is a large commitment from family members to drive her. Pt reported she is still going to medical appointments about 3-4 times per week. Pt reported she has a positive view on inpatient rehab and is excited to start IOP. Pt reported she was told by  that she would be eligible to receive in home therapy. Pt has not yet enrolled with in home therapy as she stated she was hoping to get into IOP first. SW encouraged pt to set this up as soon as she has a better idea of her discharge date. KARLA explained that if  pt is unable to sit up long enough for IOP she needs to set up any services available since she has access to in home therapy. Pt expressed understanding and in agreement. SW requested a call from pt when she does enroll. Pt expressed understanding and in agreement. Pt reiterated her excitement for starting therapy again.     Illicit Drugs/Non-prescribed Medications: none, patient denies any use.  Patient states clear understanding of the potential impact of substance use.  Substance abstinence/cessation counseling, education and resources provided and reviewed.     Services Utilizing/ADLS    Infusion Service: Prior to admission, patient utilizing? no  Home Health: Prior to admission, patient utilizing? no; Pt enrolled with [facility] at [facility] (#[phone]).  DME: Prior to admission, yes; Pt has a rolling walker but does not use. Pt reported she uses hospital wheelchair for long distances during appointments.   Pulmonary/Cardiac Rehab: Prior to admission, no  Dialysis:  Prior to admission, no  Transplant Specialty Pharmacy:  Prior to admission, yes; [facility] located at [address] [place] [address].    Prior to admission, patient reports patient was independent with ADLS and was not driving.  Patient reports patient is able to care for self at this time. Patient indicates a willingness to care for self once medically cleared to do so.    Insurance/Medications    Insured by   Payor/Plan Subscr  Sex Relation Sub. Ins. ID Effective Group Num   1. [facility]* GUADALUPE TOMLIN 1965 Female  [id number]                                    [address] [address]   2. MEDICAID - * GUADALUPE TOMLIN 1965 Female  [id number] [facility]                                   [address] [address]      Primary Insurance (for UNOS reporting): Public Insurance - Medicaid-[facility]  Secondary Insurance (for UNOS reporting): None    Patient reports patient is able to  obtain and afford medications at this time and at time of discharge.    Living Will/Healthcare Power of     Patient states patient does not have a LW and/or HCPA.   provided education regarding LW and HCPA and the completion of forms.    Coping/Mental Health    Patient is coping adequately with the aid of  family members and bhavin. Pt appears positive and motivated to continue working toward physical recovery and alcohol use recovery.  Patient denies current mental health difficulties. Pt reported she is generally a positive person and uses that to her advantage in her recovery.     Discharge Planning    At time of discharge, patient plans to return to patient's home under the care of pt's daughter Reginaldo.  Patients daughter will transport patient.  Per rounds today, expected discharge date has not been medically determined at this time. Patient and patients caregiver  verbalize understanding and are involved in treatment planning and discharge process.    Additional Concerns  Pt reports she will notify SW of disability outcome and will also contact SW once enrolled in substance abuse day program. Patient is being followed for needs, education, resources, information, emotional support, supportive counseling, and for supportive and skilled discharge plan of care.  providing ongoing psychosocial support, education, resources and d/c planning as needed.  SW remains available.  provided resource list, patient choice, psychosocial and supportive counseling, resources, education, assistance and discharge planning with patient and caregiver involvement, ongoing SW availability and services as appropriate.

## 2018-02-28 NOTE — PROGRESS NOTES
Pt arrived to [facility] bed 3 for 1 hour post ultrasound guided liver bx recovery. Report received from GUIDO Terrell. Pt denies pain/discomfort. Dressing CDI. VSS. No acute events. See flow sheets for post procedure monitoring.

## 2018-02-28 NOTE — H&P
Inpatient Radiology Pre-procedure Note    History of Present Illness:    Marcie Bazan is a 52 y.o. female status post liver transplant who presents for percutaneous liver biopsy.    Admission H&P reviewed.    Past Medical History:   Diagnosis Date    Alcohol abuse     Alcoholic hepatitis     Anemia of chronic disease     Coagulopathy     End stage liver disease     Hypertension     Hyponatremia     Liver cirrhosis     Liver transplant candidate     Obesity (BMI 30-39.9) 1/5/2016     Past Surgical History:   Procedure Laterality Date    BREAST BIOPSY      CHOLECYSTECTOMY      COLONOSCOPY N/A 12/1/2017    Procedure: COLONOSCOPY;  Surgeon: Filemon Fuentes MD;  [facility]  Service: Endoscopy;  Laterality: N/A;    COLONOSCOPY N/A 12/5/2017    Procedure: COLONOSCOPY;  Surgeon: José Chavira MD;  Location: [facility] ENDO (2ND FLR);  Service: Endoscopy;  Laterality: N/A;    HYSTERECTOMY      LIVER TRANSPLANT  12/2017       Review of Systems:   As documented in primary team H&P    Home Meds:   Prior to Admission medications    Medication Sig Start Date End Date Taking? Authorizing Provider   famotidine (PEPCID) 20 MG tablet Take 1 tablet (20 mg total) by mouth every evening. 12/27/17  Yes Franco Lawton Jr., MD   HYDROmorphone (DILAUDID) 4 MG tablet Take 0.5-1 tablets (2-4 mg total) by mouth every 6 (six) hours as needed. 2/2/18  Yes Harry Delarosa MD   magnesium oxide (MAG-OX) 400 mg tablet Take 1 tablet (400 mg total) by mouth 2 (two) times daily. 2/15/18  Yes Ashley Garduno MD   NIFEdipine (PROCARDIA-XL) 60 MG (OSM) 24 hr tablet Take 1 tablet (60 mg total) by mouth once daily. 2/16/18 2/16/19 Yes Ashley Garduno MD   tacrolimus (PROGRAF) 1 MG Cap Take 5 mg PO QAM and 4 mg PO QPM 2/23/18  Yes Joselin Souza MD   traZODone (DESYREL) 50 MG tablet Take 0.5 tablets (25 mg total) by mouth every evening. 1/5/18 1/5/19 Yes Jarrod Regan MD   valGANciclovir (VALCYTE) 450 mg Tab Take 1  tablet (450 mg total) by mouth once daily. STOP 3/26/18 2/15/18 5/16/18 Yes Ashley Garduno MD   aspirin (ECOTRIN) 81 MG EC tablet Take 1 tablet (81 mg total) by mouth once daily. 1/5/18 1/5/19  Jarrod Regan MD   bisacodyl (DULCOLAX) 5 mg EC tablet Take 1-2 tablets (5-10 mg total) by mouth daily as needed for Constipation. 1/4/18   Jarrod Regan MD   ondansetron (ZOFRAN-ODT) 4 MG TbDL DISSOLVE 1 TABLET BY MOUTH EVERY 8 HOURS AS NEEDED FOR NAUSEA AND VOMITTING 2/16/18   Joselin Souza MD     Scheduled Meds:    famotidine  20 mg Oral QHS    heparin (porcine)  5,000 Units Subcutaneous Q8H    magnesium oxide  400 mg Oral BID    NIFEdipine  60 mg Oral Daily    tacrolimus  5 mg Oral BID    traZODone  25 mg Oral QHS     Continuous Infusions:      PRN Meds:acetaminophen, dextrose 50%, dextrose 50%, glucagon (human recombinant), glucose, glucose, HYDROmorphone, insulin aspart U-100, ondansetron, sodium chloride 0.9%     Anticoagulants/Antiplatelets: aspirin 81 mg stopped 2 days ago per patient.    Allergies:   Review of patient's allergies indicates:   Allergen Reactions    Ferrous sulfate Other (See Comments)     Patient states the pill makes her sick. She stated she would rather have a shot     Sedation Hx: have not been any systemic reactions    Labs:    Recent Labs  Lab 02/27/18  0750   INR 1.1       Recent Labs  Lab 02/28/18  0531   WBC 24.48*   HGB 9.4*   HCT 29.1*   MCV 84         Recent Labs  Lab 02/27/18  0750 02/28/18  0531    182*    135*   K 4.1 3.9    104   CO2 23 20*   BUN 15 15   CREATININE 1.1 1.1   CALCIUM 9.5 8.7   MG  --  1.6   * 330*   * 295*   ALBUMIN 3.1* 2.4*   BILITOT 2.6* 3.1*   BILIDIR 1.9*  --          Vitals:  Temp: 98.6 °F (37 °C) (02/28/18 0724)  Pulse: 87 (02/28/18 0724)  Resp: 16 (02/28/18 0724)  BP: 138/72 (02/28/18 0724)  SpO2: 99 % (02/28/18 0724)     Physical Exam:  ASA: 3  Mallampati: 1    General: no acute distress  Mental Status: alert and  oriented to person, place and time  HEENT: normocephalic, atraumatic  Chest: unlabored breathing  Heart: regular heart rate  Abdomen: nondistended  Extremity: moves all extremities    Plan: Percutaneous liver biopsy  Sedation Plan: moderate    Eusebio Daily MD  PGY-5  Department of Radiology  [phone]

## 2018-02-28 NOTE — HOSPITAL COURSE
Interval History: No acute events overnight. Patient feeling okay this morning. LFTs with some improvement after SM#2. Liver biopsy 2/28 showing mild ACR. Also showed moderate steatohepatitis. Patient denies alcohol use. PETH from 2/15 negative. Repeat pending from today 3/1. Will check lipid panel, A1C. Will ask dietary to educate patient on low fat diet. Monitor.

## 2018-02-28 NOTE — ASSESSMENT & PLAN NOTE
- valcyte on hold for neutropenia, weekly CMV PCR's, most recent 2/19 undetected  - CMV PCR 2/27 pending.  - pentamidine for PCP prophylaxis (bactrim held 2/2 neutropenia), last dose 2/14.

## 2018-02-28 NOTE — ASSESSMENT & PLAN NOTE
- chronic since transplant requiring multiple doses of neupogen  -  on admission  - neupogen given x 1 on admit 2/27. Now with leukocytosis likely from SM and Neupogen. Cont to monitor.

## 2018-02-28 NOTE — ASSESSMENT & PLAN NOTE
- admitted for elevated LFTs likely 2/2 rejection  - Liver US 2/27 unremarkable  - Liver biopsy this AM, results pending  - Tolerated first dose of SM yesterday 2/28. Proceed with SM #2 today while awaiting biopsy results.

## 2018-02-28 NOTE — PLAN OF CARE
Problem: Patient Care Overview  Goal: Plan of Care Review  Pt aaox4 vswnl and c/o rt upper quad pain. Bed in low position and callbell within reach. solumetrol and granix given per md and other meds due. Pt swallowed pills with no difficulty although pt states sometimes she just chokes when nothing is in her mouth. She also states she has lost a lot of weight and tires to eat but just cant. She denies that she has difficulty swallowing the food. Just says she cnt bring herself to swallow meat sometimes and spits it out. Pt speaks and swallowed pills with no evidence of difficulty swallowing. She drank water with no choking or difficulty swallowing. Notified sally moody NP. Pt ambulates independently but told day rn sometimes she has weakness in her left leg. Pt planned for liver u/s in am and npo after mn. Standard precautions and reverse isolation maintained.

## 2018-02-28 NOTE — ASSESSMENT & PLAN NOTE
With leukocytosis on AM labs. No fevers. Likely due to Neupogen and steroids. Will continue to monitor.

## 2018-02-28 NOTE — ASSESSMENT & PLAN NOTE
- admitted for elevated LFTs likely 2/2 rejection  - Liver US 2/27 unremarkable  - NPO after midnight for transjugular liver biopsy in AM  - First dose SMP tonight

## 2018-02-28 NOTE — NURSING TRANSFER
Nursing Transfer Note      2/28/2018     Transfer From: [facility] to [facility]    Transfer via stretcher    Transported by transport    Medicines sent: N/A    Chart send with patient: Yes

## 2018-02-28 NOTE — NURSING
Procedure completed tolerated well. .  Site with bandaid clean dry and intact Transfered to [facility]. Gave report to Lambert LORA. Called report to Tracey LORA on floor.

## 2018-02-28 NOTE — ASSESSMENT & PLAN NOTE
- chronic since transplant requiring multiple doses of neupogen  -  on admission  - neupogen given x 1 today

## 2018-02-28 NOTE — HPI
Ms. Marcie Bazan is a 52 y.o. 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 Post operative course complicated by hematoma evac on 1/1/18 and neutropenia requiring multiple doses of neupogen. Cellcept, bactrim, and valcyte have been on hold 2/2 neutropenia. She was admitted for elevated LFT's suspicious for rejection. Liver US 2/27 was satisfactory without CBD dilatation. Upon admission, her , and she was given 1 dose of neupogen. She will be empirically treated with steroid pulse with plan for liver biopsy. Of note, she c/o left leg pain since transplant, pain is related to severe sensitivity to anything touching her leg. In addition, she has been seeing PT for leg pain which causes her to drag her foot at times. PT consulted.

## 2018-02-28 NOTE — ASSESSMENT & PLAN NOTE
- Maintenance IS with prograf, cellcept on hold 2/2 neutropenia. cont to check prograf level daily. Assess for toxicity and adjust level as needed

## 2018-02-28 NOTE — PROGRESS NOTES
"Pt admitted to TSU room 8081.  Rayne Currie, PA notified of pts arrival.  VSS upon assessment.  Peripheral IV started in L forearm 22 g.  Plan for SM tonight and liver biopsy in the am.  NPO at midnight. Meds reviewed with pt.  Pt c/o some pain to the LUQ refusing any pain medication stating it makes her "feel funny and whoozy".      Bed lowered and locked in place.  Call bell in reach.  Pt refusing non-skid socks.   No issues noted. Will monitor.   "

## 2018-02-28 NOTE — PROCEDURES
Radiology Post-Procedure Note    Pre Op Diagnosis: post liver transplantation - graft surveillance and Abnormal LFTs    Post Op Diagnosis: Same    Procedure: Ultrasound guided liver biopsy    Procedure performed by: Adrian Callahan MD    Written Informed Consent Obtained: Yes    Specimen Removed: YES 2 18 gauge cores    Estimated Blood Loss: Minimal    Findings: Moderate sedation and local anesthesia were used.    A 17-gauge coaxial temno biopsy system was used to remove 2 random right hepatic lobe specimen.  This specimen was sent to pathology for further evaluation.      The patient tolerated the procedure well and there were no complications.  Please see Imaging report for further details.      Toby Danielle MD  Radiology

## 2018-03-01 ENCOUNTER — CONFERENCE (OUTPATIENT)
Dept: TRANSPLANT | Facility: CLINIC | Age: 53
End: 2018-03-01

## 2018-03-01 LAB
ALBUMIN SERPL BCP-MCNC: 2.3 G/DL
ALP SERPL-CCNC: 1144 U/L
ALT SERPL W/O P-5'-P-CCNC: 263 U/L
ANION GAP SERPL CALC-SCNC: 13 MMOL/L
AST SERPL-CCNC: 171 U/L
BASOPHILS # BLD AUTO: 0.09 K/UL
BASOPHILS NFR BLD: 0.2 %
BILIRUB SERPL-MCNC: 1.9 MG/DL
BUN SERPL-MCNC: 20 MG/DL
CALCIUM SERPL-MCNC: 8.9 MG/DL
CHLORIDE SERPL-SCNC: 103 MMOL/L
CHOLEST SERPL-MCNC: 241 MG/DL
CHOLEST/HDLC SERPL: ABNORMAL {RATIO}
CO2 SERPL-SCNC: 19 MMOL/L
CREAT SERPL-MCNC: 1.5 MG/DL
DIFFERENTIAL METHOD: ABNORMAL
EOSINOPHIL # BLD AUTO: 0 K/UL
EOSINOPHIL NFR BLD: 0 %
ERYTHROCYTE [DISTWIDTH] IN BLOOD BY AUTOMATED COUNT: 18 %
EST. GFR  (AFRICAN AMERICAN): 45.2 ML/MIN/1.73 M^2
EST. GFR  (NON AFRICAN AMERICAN): 39.2 ML/MIN/1.73 M^2
ESTIMATED AVG GLUCOSE: 74 MG/DL
GLUCOSE SERPL-MCNC: 146 MG/DL
HBA1C MFR BLD HPLC: 4.2 %
HCT VFR BLD AUTO: 27.9 %
HDLC SERPL-MCNC: <5 MG/DL
HDLC SERPL: ABNORMAL %
HGB BLD-MCNC: 9.1 G/DL
IMM GRANULOCYTES # BLD AUTO: 1.71 K/UL
IMM GRANULOCYTES NFR BLD AUTO: 4.7 %
LDLC SERPL CALC-MCNC: ABNORMAL MG/DL
LYMPHOCYTES # BLD AUTO: 1.3 K/UL
LYMPHOCYTES NFR BLD: 3.6 %
MAGNESIUM SERPL-MCNC: 1.9 MG/DL
MCH RBC QN AUTO: 27.7 PG
MCHC RBC AUTO-ENTMCNC: 32.6 G/DL
MCV RBC AUTO: 85 FL
MONOCYTES # BLD AUTO: 0.4 K/UL
MONOCYTES NFR BLD: 1 %
NEUTROPHILS # BLD AUTO: 33.2 K/UL
NEUTROPHILS NFR BLD: 90.5 %
NONHDLC SERPL-MCNC: ABNORMAL MG/DL
NRBC BLD-RTO: 0 /100 WBC
PLATELET # BLD AUTO: 225 K/UL
PMV BLD AUTO: 11.5 FL
POCT GLUCOSE: 113 MG/DL (ref 70–110)
POCT GLUCOSE: 136 MG/DL (ref 70–110)
POCT GLUCOSE: 221 MG/DL (ref 70–110)
POTASSIUM SERPL-SCNC: 3.6 MMOL/L
PROT SERPL-MCNC: 5.6 G/DL
RBC # BLD AUTO: 3.28 M/UL
SODIUM SERPL-SCNC: 135 MMOL/L
TACROLIMUS BLD-MCNC: 10.1 NG/ML
TRIGL SERPL-MCNC: 222 MG/DL
WBC # BLD AUTO: 36.65 K/UL

## 2018-03-01 PROCEDURE — 80053 COMPREHEN METABOLIC PANEL: CPT

## 2018-03-01 PROCEDURE — 25000003 PHARM REV CODE 250: Performed by: PHYSICIAN ASSISTANT

## 2018-03-01 PROCEDURE — 83036 HEMOGLOBIN GLYCOSYLATED A1C: CPT

## 2018-03-01 PROCEDURE — 25000003 PHARM REV CODE 250: Performed by: NURSE PRACTITIONER

## 2018-03-01 PROCEDURE — 80061 LIPID PANEL: CPT

## 2018-03-01 PROCEDURE — 80321 ALCOHOLS BIOMARKERS 1OR 2: CPT

## 2018-03-01 PROCEDURE — 80197 ASSAY OF TACROLIMUS: CPT

## 2018-03-01 PROCEDURE — 97161 PT EVAL LOW COMPLEX 20 MIN: CPT

## 2018-03-01 PROCEDURE — 20600001 HC STEP DOWN PRIVATE ROOM

## 2018-03-01 PROCEDURE — 99233 SBSQ HOSP IP/OBS HIGH 50: CPT | Mod: 24,,, | Performed by: PHYSICIAN ASSISTANT

## 2018-03-01 PROCEDURE — 83735 ASSAY OF MAGNESIUM: CPT

## 2018-03-01 PROCEDURE — 97116 GAIT TRAINING THERAPY: CPT

## 2018-03-01 PROCEDURE — 63600175 PHARM REV CODE 636 W HCPCS: Performed by: NURSE PRACTITIONER

## 2018-03-01 PROCEDURE — 63600175 PHARM REV CODE 636 W HCPCS: Performed by: PHYSICIAN ASSISTANT

## 2018-03-01 PROCEDURE — 36415 COLL VENOUS BLD VENIPUNCTURE: CPT

## 2018-03-01 PROCEDURE — 85025 COMPLETE CBC W/AUTO DIFF WBC: CPT

## 2018-03-01 RX ORDER — TACROLIMUS 1 MG/1
4 CAPSULE ORAL 2 TIMES DAILY
Status: DISCONTINUED | OUTPATIENT
Start: 2018-03-01 | End: 2018-03-02 | Stop reason: HOSPADM

## 2018-03-01 RX ORDER — MYCOPHENOLATE MOFETIL 250 MG/1
1000 CAPSULE ORAL 2 TIMES DAILY
Status: DISCONTINUED | OUTPATIENT
Start: 2018-03-01 | End: 2018-03-02 | Stop reason: HOSPADM

## 2018-03-01 RX ORDER — CYCLOBENZAPRINE HCL 5 MG
5 TABLET ORAL ONCE
Status: COMPLETED | OUTPATIENT
Start: 2018-03-01 | End: 2018-03-01

## 2018-03-01 RX ADMIN — HEPARIN SODIUM 5000 UNITS: 5000 INJECTION, SOLUTION INTRAVENOUS; SUBCUTANEOUS at 01:03

## 2018-03-01 RX ADMIN — CYCLOBENZAPRINE HYDROCHLORIDE 5 MG: 5 TABLET, FILM COATED ORAL at 04:03

## 2018-03-01 RX ADMIN — TACROLIMUS 6 MG: 1 CAPSULE ORAL at 08:03

## 2018-03-01 RX ADMIN — MYCOPHENOLATE MOFETIL 1000 MG: 250 CAPSULE ORAL at 08:03

## 2018-03-01 RX ADMIN — MAGNESIUM OXIDE TAB 400 MG (241.3 MG ELEMENTAL MG) 400 MG: 400 (241.3 MG) TAB at 08:03

## 2018-03-01 RX ADMIN — TRAZODONE HYDROCHLORIDE 25 MG: 50 TABLET ORAL at 08:03

## 2018-03-01 RX ADMIN — NYSTATIN 500000 UNITS: 500000 SUSPENSION ORAL at 08:03

## 2018-03-01 RX ADMIN — MYCOPHENOLATE MOFETIL 1000 MG: 250 CAPSULE ORAL at 11:03

## 2018-03-01 RX ADMIN — NIFEDIPINE 60 MG: 60 TABLET, FILM COATED, EXTENDED RELEASE ORAL at 08:03

## 2018-03-01 RX ADMIN — GABAPENTIN 300 MG: 300 CAPSULE ORAL at 08:03

## 2018-03-01 RX ADMIN — FAMOTIDINE 20 MG: 20 TABLET, FILM COATED ORAL at 08:03

## 2018-03-01 RX ADMIN — TACROLIMUS 4 MG: 1 CAPSULE ORAL at 05:03

## 2018-03-01 RX ADMIN — NYSTATIN 500000 UNITS: 500000 SUSPENSION ORAL at 01:03

## 2018-03-01 RX ADMIN — HEPARIN SODIUM 5000 UNITS: 5000 INJECTION, SOLUTION INTRAVENOUS; SUBCUTANEOUS at 08:03

## 2018-03-01 RX ADMIN — DEXTROSE: 50 INJECTION, SOLUTION INTRAVENOUS at 01:03

## 2018-03-01 NOTE — ASSESSMENT & PLAN NOTE
- admitted for elevated LFTs likely 2/2 rejection  - Liver US 2/27 unremarkable  - SM x1 2/27, SM #2 2/28, plan for SM#3 today.   -  LFTs with some improvement after SM#2. Liver biopsy 2/28 showing mild ACR. Also showed moderate steatohepatitis. Patient denies alcohol use. PETH from 2/15 negative. Repeat pending from today 3/1. Will check lipid panel, A1C. Will ask dietary to educate patient on low fat diet. Monitor.

## 2018-03-01 NOTE — SUBJECTIVE & OBJECTIVE
Scheduled Meds:   famotidine  20 mg Oral QHS    gabapentin  300 mg Oral BID    heparin (porcine)  5,000 Units Subcutaneous Q8H    magnesium oxide  400 mg Oral BID    mycophenolate  1,000 mg Oral BID    NIFEdipine  60 mg Oral Daily    nystatin  500,000 Units Oral TID PC    tacrolimus  4 mg Oral BID    traZODone  25 mg Oral QHS     Continuous Infusions:  PRN Meds:acetaminophen, dextrose 50%, dextrose 50%, glucagon (human recombinant), glucose, glucose, HYDROmorphone, insulin aspart U-100, ondansetron, sodium chloride 0.9%    Review of Systems   Constitutional: Negative for activity change, appetite change, chills and fever.   Respiratory: Negative for cough, shortness of breath and wheezing.    Cardiovascular: Negative for leg swelling.   Gastrointestinal: Negative for abdominal distention and abdominal pain.   Genitourinary: Negative for decreased urine volume, difficulty urinating, dysuria and hematuria.   Allergic/Immunologic: Positive for immunocompromised state.   Neurological: Positive for weakness (LLE, improving).   Psychiatric/Behavioral: Negative for agitation, confusion and decreased concentration.     Objective:     Vital Signs (Most Recent):  Temp: 98.1 °F (36.7 °C) (03/01/18 1105)  Pulse: 77 (03/01/18 1105)  Resp: 17 (03/01/18 1105)  BP: 137/77 (03/01/18 1105)  SpO2: 99 % (03/01/18 1105) Vital Signs (24h Range):  Temp:  [97.9 °F (36.6 °C)-98.7 °F (37.1 °C)] 98.1 °F (36.7 °C)  Pulse:  [77-86] 77  Resp:  [14-20] 17  SpO2:  [98 %-100 %] 99 %  BP: (123-161)/(63-86) 137/77     Weight: 53.5 kg (117 lb 15.1 oz)  Body mass index is 20.89 kg/m².    Intake/Output - Last 3 Shifts       02/27 0700 - 02/28 0659 02/28 0700 - 03/01 0659 03/01 0700 - 03/02 0659    P.O.  860 560    I.V. (mL/kg)  0 (0) 0 (0)    Other  0 0    IV Piggyback 100      Total Intake(mL/kg) 100 (1.9) 860 (16.1) 560 (10.5)    Urine (mL/kg/hr)  1200 (0.9) 300 (0.6)    Emesis/NG output   0 (0)    Other   0 (0)    Stool  0 (0) 0 (0)    Blood    0 (0)    Total Output   1200 300    Net +100 -340 +260           Urine Occurrence 1 x 3 x 0 x    Stool Occurrence 1 x 1 x 1 x    Emesis Occurrence   0 x          Physical Exam   Constitutional: She is oriented to person, place, and time. She appears well-developed and well-nourished. No distress.   Cardiovascular: Normal rate and regular rhythm.  Exam reveals no friction rub.    No murmur heard.  Pulmonary/Chest: Effort normal and breath sounds normal. She has no wheezes. She has no rales.   Abdominal: Soft. She exhibits no distension. There is no tenderness.   Musculoskeletal: She exhibits tenderness (LLE).   Neurological: She is alert and oriented to person, place, and time.   Skin: She is not diaphoretic.   Psychiatric: She has a normal mood and affect. Her behavior is normal. Judgment and thought content normal.   Nursing note and vitals reviewed.      Laboratory:  Immunosuppressants         Stop Route Frequency     tacrolimus capsule 4 mg      -- Oral 2 times daily     mycophenolate capsule 1,000 mg      -- Oral 2 times daily        CBC:     Recent Labs  Lab 03/01/18  0522   WBC 36.65*   RBC 3.28*   HGB 9.1*   HCT 27.9*      MCV 85   MCH 27.7   MCHC 32.6     CMP:     Recent Labs  Lab 03/01/18  0522   *   CALCIUM 8.9   ALBUMIN 2.3*   PROT 5.6*   *   K 3.6   CO2 19*      BUN 20   CREATININE 1.5*   ALKPHOS 1,144*   *   *   BILITOT 1.9*     Labs within the past 24 hours have been reviewed.    Diagnostic Results:  US Liver Transplant Post:  No results found for this or any previous visit.

## 2018-03-01 NOTE — PT/OT/SLP EVAL
"Physical Therapy Evaluation    Patient Name:  Marcie Bazan   MRN:  [id number]    Recommendations:     Discharge Recommendations:  home   Discharge Equipment Recommendations: none   Barriers to discharge: None    Assessment:     Marcie Bazan is a 52 y.o. female admitted with a medical diagnosis of Elevated liver enzymes.  She presents with the following impairments/functional limitations:  weakness.   Pt is to be discharged at this time as pt is  independent with all functional mobility assessed.  Ed on how to request for PT services if a decline in function is noted.      Recent Surgery: * No surgery found *      Plan:     During this hospitalization, patient to be seen   to address the above listed problems via gait training, therapeutic activities, therapeutic exercises, neuromuscular re-education  · Plan of Care Expires:      Plan of Care Reviewed with: patient    Subjective     Communicated with nursing prior to session.  Patient found in supine upon PT entry to room, agreeable to evaluation.      "I know my limits."    Chief Complaint: weight loss  Pain/Comfort:  · Pain Rating 1: 0/10    Patients cultural, spiritual, Bahai conflicts given the current situation: no    Living Environment:  Pt lives with daughter and grandchildren, [facility], [address]  Pt and children are able to drive.   Prior to admission, patients level of function was independent with all.  Patient has the following equipment: none.  DME owned (not currently used): rolling walker, bedside commode and wheelchair.  Upon discharge, patient will have assistance from family.    Objective:     Patient found with: peripheral IV     General Precautions: Standard, fall   Orthopedic Precautions:N/A   Braces: N/A     Exams:    · Cognitive Exam:  Patient is oriented to Person, Place, Time and Situation and follows 100% of multi step commands   · Fine Motor Coordination:    · -       Intact  Left hand thumb/finger opposition skills and Right hand " thumb/finger opposition skills  · Gross Motor Coordination:  WFL  · Postural Exam:  Patient presented with the following abnormalities:    · -       No postural abnormalities identified  · Sensation:    · -       Intact  light/touch B LEs, hypersensitivity in L foot  · Skin Integrity/Edema:      · -       Skin integrity: Visible skin intact  · -       Edema: None noted B LEs    · RUE ROM: WFL  · RUE Strength: WFL  · LUE ROM: WFL  · LUE Strength: WFL    · RLE ROM: WFL  · RLE Strength: WFL except 4+/5 hip flex   · LLE ROM: WFL   · LLE Strength: WFL except 4/5 hip flex and DF    Functional Mobility:    · Bed Mobility:     · Scooting: independence  · Supine to Sit: independence    · Transfers:     · Sit to Stand:  independence with no AD  · Bed to Chair: independence with  no AD  using  Stand Pivot    · Gait: Pt amb >700', independently without AD, no episodes of LOB    · Balance: I: dynamic standing balance without AD    AM-PAC 6 CLICK MOBILITY  Total Score:24       Therapeutic Activities and Exercises:   Whiteboard updated    Patient left up in chair with all lines intact and call button in reach.    GOALS:    Physical Therapy Goals     Not on file                History:     Past Medical History:   Diagnosis Date    Alcohol abuse     Alcoholic hepatitis     Anemia of chronic disease     Coagulopathy     End stage liver disease     Hypertension     Hyponatremia     Liver cirrhosis     Liver transplant candidate     Obesity (BMI 30-39.9) 1/5/2016       Past Surgical History:   Procedure Laterality Date    BREAST BIOPSY      CHOLECYSTECTOMY      COLONOSCOPY N/A 12/1/2017    Procedure: COLONOSCOPY;  Surgeon: Filemon Fuentes MD;  Location: [address]);  Service: Endoscopy;  Laterality: N/A;    COLONOSCOPY N/A 12/5/2017    Procedure: COLONOSCOPY;  Surgeon: José Chavira MD;  Location: [address]);  Service: Endoscopy;  Laterality: N/A;    HYSTERECTOMY      LIVER TRANSPLANT  12/2017        Clinical Decision Making:     History  Co-morbidities and personal factors that may impact the plan of care Examination  Body Structures and Functions, activity limitations and participation restrictions that may impact the plan of care Clinical Presentation   Decision Making/ Complexity Score   Co-morbidities:   [] Time since onset of injury / illness / exacerbation  [] Status of current condition  []Patient's cognitive status and safety concerns    [] Multiple Medical Problems (see med hx)  Personal Factors:   [] Patient's age  [] Prior Level of function   [] Patient's home situation (environment and family support)  [] Patient's level of motivation  [] Expected progression of patient      HISTORY:(criteria)    [] 99066 - no personal factors/history    [x] 97297 - has 1-2 personal factor/comorbidity     [] 17850 - has >3 personal factor/comorbidity     Body Regions:  [] Objective examination findings  [] Head     []  Neck  [x] Trunk   [] Upper Extremity  [] Lower Extremity    Body Systems:  [] For communication ability, affect, cognition, language, and learning style: the assessment of the ability to make needs known, consciousness, orientation (person, place, and time), expected emotional /behavioral responses, and learning preferences (eg, learning barriers, education  needs)  [] For the neuromuscular system: a general assessment of gross coordinated movement (eg, balance, gait, locomotion, transfers, and transitions) and motor function  (motor control and motor learning)  [x] For the musculoskeletal system: the assessment of gross symmetry, gross range of motion, gross strength, height, and weight  [] For the integumentary system: the assessment of pliability(texture), presence of scar formation, skin color, and skin integrity  [] For cardiovascular/pulmonary system: the assessment of heart rate, respiratory rate, blood pressure, and edema     Activity limitations:    [] Patient's cognitive status and saf  ety concerns          [] Status of current condition      [] Weight bearing restriction  [] Cardiopulmunary Restriction    Participation Restrictions:   [] Goals and goal agreement with the patient     [] Rehab potential (prognosis) and probable outcome      Examination of Body System: (criteria)    [x] 77306 - addressing 1-2 elements    [] 03109 - addressing a total of 3 or more elements     [] 68836 -  Addressing a total of 4 or more elements         Clinical Presentation: (criteria)  Stable - 02445     On examination of body system using standardized tests and measures patient presents with 1-2 elements from any of the following: body structures and functions, activity limitations, and/or participation restrictions.  Leading to a clinical presentation that is considered stable and/or uncomplicated                              Clinical Decision Making  (Eval Complexity):  Low- 80848     Time Tracking:     PT Received On: 03/01/18  PT Start Time: 1707     PT Stop Time: 1734  PT Total Time (min): 27 min     Billable Minutes: Evaluation 12 and Gait Training 14      Paulette Cohn, PT  03/01/2018

## 2018-03-01 NOTE — ASSESSMENT & PLAN NOTE
- Maintenance IS with prograf, cellcept was on hold 2/2 neutropenia. Will restart 3/1 full dose. Cont to check prograf level daily. Assess for toxicity and adjust level as needed

## 2018-03-01 NOTE — PROGRESS NOTES
[facility]  Liver Transplant  Progress Note    Patient Name: Marcie Bazan  MRN: [id number]  Admission Date: 2018  Hospital Length of Stay: 2 days  Code Status: Full Code  Primary Care Provider: ODILIA Wall  Post-Operative Day: 65    ORGAN:   LIVER  Disease Etiology: Alcoholic Cirrhosis  Donor Type:    - Brain Death  CDC High Risk:   No  Donor CMV Status:   Donor CMV Status: Negative  Donor HBcAB:   Negative  Donor HCV Status:   Negative  Whole or Partial: Whole Liver  Biliary Anastomosis: End to End  Arterial Anatomy: Standard  Subjective:     History of Present Illness:  Ms. Marcie Bazan is a 52 y.o. 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 Post operative course complicated by hematoma evac on 18 and neutropenia requiring multiple doses of neupogen. Cellcept, bactrim, and valcyte have been on hold 2/2 neutropenia. She was admitted for elevated LFT's suspicious for rejection. Liver US  was satisfactory without CBD dilatation. Upon admission, her , and she was given 1 dose of neupogen. She will be empirically treated with steroid pulse with plan for liver biopsy. Of note, she c/o left leg pain since transplant, pain is related to severe sensitivity to anything touching her leg. In addition, she has been seeing PT for leg pain which causes her to drag her foot at times. PT consulted.       Hospital Course:  Interval History: No acute events overnight. Patient feeling okay this morning. LFTs with some improvement after SM#2. Liver biopsy  showing mild ACR. Also showed moderate steatohepatitis. Patient denies alcohol use. PETH from 2/15 negative. Repeat pending from today 3/1. Will check lipid panel, A1C. Will ask dietary to educate patient on low fat diet. Monitor.           Scheduled Meds:   famotidine  20 mg Oral QHS    gabapentin  300 mg Oral BID    heparin (porcine)  5,000 Units Subcutaneous Q8H     magnesium oxide  400 mg Oral BID    mycophenolate  1,000 mg Oral BID    NIFEdipine  60 mg Oral Daily    nystatin  500,000 Units Oral TID PC    tacrolimus  4 mg Oral BID    traZODone  25 mg Oral QHS     Continuous Infusions:  PRN Meds:acetaminophen, dextrose 50%, dextrose 50%, glucagon (human recombinant), glucose, glucose, HYDROmorphone, insulin aspart U-100, ondansetron, sodium chloride 0.9%    Review of Systems   Constitutional: Negative for activity change, appetite change, chills and fever.   Respiratory: Negative for cough, shortness of breath and wheezing.    Cardiovascular: Negative for leg swelling.   Gastrointestinal: Negative for abdominal distention and abdominal pain.   Genitourinary: Negative for decreased urine volume, difficulty urinating, dysuria and hematuria.   Allergic/Immunologic: Positive for immunocompromised state.   Neurological: Positive for weakness (LLE, improving).   Psychiatric/Behavioral: Negative for agitation, confusion and decreased concentration.     Objective:     Vital Signs (Most Recent):  Temp: 98.1 °F (36.7 °C) (03/01/18 1105)  Pulse: 77 (03/01/18 1105)  Resp: 17 (03/01/18 1105)  BP: 137/77 (03/01/18 1105)  SpO2: 99 % (03/01/18 1105) Vital Signs (24h Range):  Temp:  [97.9 °F (36.6 °C)-98.7 °F (37.1 °C)] 98.1 °F (36.7 °C)  Pulse:  [77-86] 77  Resp:  [14-20] 17  SpO2:  [98 %-100 %] 99 %  BP: (123-161)/(63-86) 137/77     Weight: 53.5 kg (117 lb 15.1 oz)  Body mass index is 20.89 kg/m².    Intake/Output - Last 3 Shifts       02/27 0700 - 02/28 0659 02/28 0700 - 03/01 0659 03/01 0700 - 03/02 0659    P.O.  860 560    I.V. (mL/kg)  0 (0) 0 (0)    Other  0 0    IV Piggyback 100      Total Intake(mL/kg) 100 (1.9) 860 (16.1) 560 (10.5)    Urine (mL/kg/hr)  1200 (0.9) 300 (0.6)    Emesis/NG output   0 (0)    Other   0 (0)    Stool  0 (0) 0 (0)    Blood   0 (0)    Total Output   1200 300    Net +100 -340 +260           Urine Occurrence 1 x 3 x 0 x    Stool Occurrence 1 x 1 x 1 x     Emesis Occurrence   0 x          Physical Exam   Constitutional: She is oriented to person, place, and time. She appears well-developed and well-nourished. No distress.   Cardiovascular: Normal rate and regular rhythm.  Exam reveals no friction rub.    No murmur heard.  Pulmonary/Chest: Effort normal and breath sounds normal. She has no wheezes. She has no rales.   Abdominal: Soft. She exhibits no distension. There is no tenderness.   Musculoskeletal: She exhibits tenderness (LLE).   Neurological: She is alert and oriented to person, place, and time.   Skin: She is not diaphoretic.   Psychiatric: She has a normal mood and affect. Her behavior is normal. Judgment and thought content normal.   Nursing note and vitals reviewed.      Laboratory:  Immunosuppressants         Stop Route Frequency     tacrolimus capsule 4 mg      -- Oral 2 times daily     mycophenolate capsule 1,000 mg      -- Oral 2 times daily        CBC:     Recent Labs  Lab 03/01/18  0522   WBC 36.65*   RBC 3.28*   HGB 9.1*   HCT 27.9*      MCV 85   MCH 27.7   MCHC 32.6     CMP:     Recent Labs  Lab 03/01/18  0522   *   CALCIUM 8.9   ALBUMIN 2.3*   PROT 5.6*   *   K 3.6   CO2 19*      BUN 20   CREATININE 1.5*   ALKPHOS 1,144*   *   *   BILITOT 1.9*     Labs within the past 24 hours have been reviewed.    Diagnostic Results:  US Liver Transplant Post:  No results found for this or any previous visit.    Assessment/Plan:     * Elevated liver enzymes    - admitted for elevated LFTs likely 2/2 rejection  - Liver US 2/27 unremarkable  - SM x1 2/27, SM #2 2/28, plan for SM#3 today.   -  LFTs with some improvement after SM#2. Liver biopsy 2/28 showing mild ACR. Also showed moderate steatohepatitis. Patient denies alcohol use. PETH from 2/15 negative. Repeat pending from today 3/1. Will check lipid panel, A1C. Will ask dietary to educate patient on low fat diet. Monitor.         Pain of left lower extremity    -  chronic since transplant.  - PT ordered for eval  -Gabapentin started with improvement.         Long-term use of immunosuppressant medication    - Maintenance IS with prograf, cellcept was on hold 2/2 neutropenia. Will restart 3/1 full dose. Cont to check prograf level daily. Assess for toxicity and adjust level as needed        Neutropenia    - chronic since transplant requiring multiple doses of neupogen  -  on admission  - neupogen given x 1 on admit 2/27. Now with leukocytosis likely from SM and Neupogen. Cont to monitor.         At risk for opportunistic infections    - valcyte on hold for neutropenia, weekly CMV PCR's, most recent 2/19 undetected  - CMV PCR 2/27 pending.  - pentamidine for PCP prophylaxis (bactrim held 2/2 neutropenia), last dose 2/14.        Prophylactic immunotherapy    - see long term use of immunosuppressant medication        Liver transplant 12/26/2017 for ETOH    - see elevated LFTs        Leukocytosis     Likely due to Neupogen and steroids. Afebrile. Will continue to monitor.           Anemia of chronic disease    H/H stable. Cont to monitor with daily labs.               VTE Risk Mitigation         Ordered     heparin (porcine) injection 5,000 Units  Every 8 hours     Route:  Subcutaneous        02/27/18 1805     Medium Risk of VTE  Once      02/27/18 1805     Place sequential compression device  Until discontinued      02/27/18 1805          The patients clinical status was discussed at multidisplinary rounds, involving transplant surgery, transplant medicine, pharmacy, nursing, nutrition, and social work    Discharge Planning: Not a candidate for discharge at this time. Possible discharge tomorrow.   No Patient Care Coordination Note on file.      Rayne Currie, PAYongC  Liver Transplant  [facility]

## 2018-03-02 VITALS
RESPIRATION RATE: 18 BRPM | DIASTOLIC BLOOD PRESSURE: 85 MMHG | BODY MASS INDEX: 22.27 KG/M2 | SYSTOLIC BLOOD PRESSURE: 146 MMHG | HEIGHT: 63 IN | WEIGHT: 125.69 LBS | HEART RATE: 74 BPM | TEMPERATURE: 98 F | OXYGEN SATURATION: 100 %

## 2018-03-02 DIAGNOSIS — Z94.4 LIVER REPLACED BY TRANSPLANT: Primary | ICD-10-CM

## 2018-03-02 LAB
ALBUMIN SERPL BCP-MCNC: 2.3 G/DL
ALP SERPL-CCNC: 1165 U/L
ALT SERPL W/O P-5'-P-CCNC: 219 U/L
ANION GAP SERPL CALC-SCNC: 10 MMOL/L
AST SERPL-CCNC: 121 U/L
BASOPHILS # BLD AUTO: 0.03 K/UL
BASOPHILS NFR BLD: 0.1 %
BILIRUB SERPL-MCNC: 1.5 MG/DL
BUN SERPL-MCNC: 20 MG/DL
CALCIUM SERPL-MCNC: 8.9 MG/DL
CHLORIDE SERPL-SCNC: 105 MMOL/L
CHOLEST SERPL-MCNC: 271 MG/DL
CHOLEST/HDLC SERPL: 33.9 {RATIO}
CO2 SERPL-SCNC: 23 MMOL/L
CREAT SERPL-MCNC: 1.2 MG/DL
DIFFERENTIAL METHOD: ABNORMAL
EOSINOPHIL # BLD AUTO: 0 K/UL
EOSINOPHIL NFR BLD: 0 %
ERYTHROCYTE [DISTWIDTH] IN BLOOD BY AUTOMATED COUNT: 18.6 %
EST. GFR  (AFRICAN AMERICAN): 59.2 ML/MIN/1.73 M^2
EST. GFR  (NON AFRICAN AMERICAN): 51.4 ML/MIN/1.73 M^2
GLUCOSE SERPL-MCNC: 102 MG/DL
HCT VFR BLD AUTO: 29 %
HDLC SERPL-MCNC: 8 MG/DL
HDLC SERPL: 3 %
HGB BLD-MCNC: 9.7 G/DL
IMM GRANULOCYTES # BLD AUTO: 1.35 K/UL
IMM GRANULOCYTES NFR BLD AUTO: 4.7 %
LDLC SERPL CALC-MCNC: 216 MG/DL
LYMPHOCYTES # BLD AUTO: 1.5 K/UL
LYMPHOCYTES NFR BLD: 5.2 %
MAGNESIUM SERPL-MCNC: 1.7 MG/DL
MCH RBC QN AUTO: 28.3 PG
MCHC RBC AUTO-ENTMCNC: 33.4 G/DL
MCV RBC AUTO: 85 FL
MONOCYTES # BLD AUTO: 0.7 K/UL
MONOCYTES NFR BLD: 2.4 %
NEUTROPHILS # BLD AUTO: 25.1 K/UL
NEUTROPHILS NFR BLD: 87.6 %
NONHDLC SERPL-MCNC: 263 MG/DL
NRBC BLD-RTO: 0 /100 WBC
PLATELET # BLD AUTO: 231 K/UL
PLATELET BLD QL SMEAR: ABNORMAL
PMV BLD AUTO: 11.8 FL
POCT GLUCOSE: 103 MG/DL (ref 70–110)
POCT GLUCOSE: 106 MG/DL (ref 70–110)
POTASSIUM SERPL-SCNC: 3.5 MMOL/L
PROT SERPL-MCNC: 5.7 G/DL
RBC # BLD AUTO: 3.43 M/UL
SODIUM SERPL-SCNC: 138 MMOL/L
TACROLIMUS BLD-MCNC: 11.9 NG/ML
TRIGL SERPL-MCNC: 235 MG/DL
WBC # BLD AUTO: 28.72 K/UL

## 2018-03-02 PROCEDURE — 25000003 PHARM REV CODE 250: Performed by: NURSE PRACTITIONER

## 2018-03-02 PROCEDURE — 25000003 PHARM REV CODE 250: Performed by: PHYSICIAN ASSISTANT

## 2018-03-02 PROCEDURE — 36415 COLL VENOUS BLD VENIPUNCTURE: CPT

## 2018-03-02 PROCEDURE — 80061 LIPID PANEL: CPT

## 2018-03-02 PROCEDURE — 83735 ASSAY OF MAGNESIUM: CPT

## 2018-03-02 PROCEDURE — 85025 COMPLETE CBC W/AUTO DIFF WBC: CPT

## 2018-03-02 PROCEDURE — 80197 ASSAY OF TACROLIMUS: CPT

## 2018-03-02 PROCEDURE — 63600175 PHARM REV CODE 636 W HCPCS: Performed by: PHYSICIAN ASSISTANT

## 2018-03-02 PROCEDURE — 80053 COMPREHEN METABOLIC PANEL: CPT

## 2018-03-02 PROCEDURE — 99239 HOSP IP/OBS DSCHRG MGMT >30: CPT | Mod: 24,,, | Performed by: PHYSICIAN ASSISTANT

## 2018-03-02 RX ORDER — PREDNISONE 10 MG/1
100 TABLET ORAL 2 TIMES DAILY
Status: DISCONTINUED | OUTPATIENT
Start: 2018-03-02 | End: 2018-03-02 | Stop reason: HOSPADM

## 2018-03-02 RX ORDER — ONDANSETRON 4 MG/1
TABLET, ORALLY DISINTEGRATING ORAL
Qty: 12 TABLET | Refills: 2 | Status: SHIPPED | OUTPATIENT
Start: 2018-03-02 | End: 2018-09-13

## 2018-03-02 RX ORDER — HYDROMORPHONE HYDROCHLORIDE 4 MG/1
2-4 TABLET ORAL EVERY 6 HOURS PRN
Qty: 20 TABLET | Refills: 0 | Status: CANCELLED | OUTPATIENT
Start: 2018-03-02

## 2018-03-02 RX ORDER — ATORVASTATIN CALCIUM 40 MG/1
40 TABLET, FILM COATED ORAL DAILY
Qty: 90 TABLET | Refills: 3 | Status: SHIPPED | OUTPATIENT
Start: 2018-03-02 | End: 2018-03-02 | Stop reason: SDUPTHER

## 2018-03-02 RX ORDER — ONDANSETRON 4 MG/1
TABLET, ORALLY DISINTEGRATING ORAL
Qty: 12 TABLET | Refills: 2 | Status: SHIPPED | OUTPATIENT
Start: 2018-03-02 | End: 2018-03-02 | Stop reason: SDUPTHER

## 2018-03-02 RX ORDER — ALBUTEROL SULFATE 0.83 MG/ML
2.5 SOLUTION RESPIRATORY (INHALATION) EVERY 4 HOURS
Status: CANCELLED
Start: 2018-03-14

## 2018-03-02 RX ORDER — PREDNISONE 10 MG/1
TABLET ORAL
Qty: 200 TABLET | Refills: 0 | Status: SHIPPED | OUTPATIENT
Start: 2018-03-02 | End: 2018-03-02 | Stop reason: SDUPTHER

## 2018-03-02 RX ORDER — MYCOPHENOLATE MOFETIL 250 MG/1
1000 CAPSULE ORAL 2 TIMES DAILY
Qty: 240 CAPSULE | Refills: 11 | Status: SHIPPED | OUTPATIENT
Start: 2018-03-02 | End: 2018-05-11 | Stop reason: SDUPTHER

## 2018-03-02 RX ORDER — PREDNISONE 10 MG/1
TABLET ORAL
Qty: 200 TABLET | Refills: 0 | Status: ON HOLD | OUTPATIENT
Start: 2018-03-02 | End: 2018-03-16

## 2018-03-02 RX ORDER — NYSTATIN 100000 [USP'U]/ML
500000 SUSPENSION ORAL
Qty: 473 ML | Refills: 0 | Status: ON HOLD | OUTPATIENT
Start: 2018-03-02 | End: 2018-03-17

## 2018-03-02 RX ORDER — PENTAMIDINE ISETHIONATE 300 MG/300MG
300 INHALANT RESPIRATORY (INHALATION)
Status: CANCELLED | OUTPATIENT
Start: 2018-03-14

## 2018-03-02 RX ORDER — ATORVASTATIN CALCIUM 40 MG/1
40 TABLET, FILM COATED ORAL DAILY
Qty: 90 TABLET | Refills: 3 | Status: SHIPPED | OUTPATIENT
Start: 2018-03-02 | End: 2019-11-04 | Stop reason: SDUPTHER

## 2018-03-02 RX ORDER — GABAPENTIN 300 MG/1
300 CAPSULE ORAL 2 TIMES DAILY
Qty: 60 CAPSULE | Refills: 11 | Status: SHIPPED | OUTPATIENT
Start: 2018-03-02 | End: 2018-06-13 | Stop reason: SDUPTHER

## 2018-03-02 RX ORDER — PREDNISONE 10 MG/1
20 TABLET ORAL DAILY
Status: CANCELLED | OUTPATIENT
Start: 2018-03-02

## 2018-03-02 RX ORDER — TACROLIMUS 1 MG/1
CAPSULE ORAL
Qty: 240 CAPSULE | Refills: 11 | Status: SHIPPED | OUTPATIENT
Start: 2018-03-02 | End: 2018-03-06 | Stop reason: SDUPTHER

## 2018-03-02 RX ORDER — NYSTATIN 100000 [USP'U]/ML
500000 SUSPENSION ORAL
Qty: 473 ML | Refills: 0 | Status: SHIPPED | OUTPATIENT
Start: 2018-03-02 | End: 2018-03-02 | Stop reason: SDUPTHER

## 2018-03-02 RX ORDER — MYCOPHENOLATE MOFETIL 250 MG/1
1000 CAPSULE ORAL 2 TIMES DAILY
Qty: 240 CAPSULE | Refills: 11 | Status: SHIPPED | OUTPATIENT
Start: 2018-03-02 | End: 2018-03-02 | Stop reason: SDUPTHER

## 2018-03-02 RX ORDER — CYCLOBENZAPRINE HCL 5 MG
5 TABLET ORAL 3 TIMES DAILY PRN
Qty: 30 TABLET | Refills: 0 | Status: ON HOLD | OUTPATIENT
Start: 2018-03-02 | End: 2018-03-17

## 2018-03-02 RX ORDER — GABAPENTIN 300 MG/1
300 CAPSULE ORAL 2 TIMES DAILY
Qty: 60 CAPSULE | Refills: 11 | Status: SHIPPED | OUTPATIENT
Start: 2018-03-02 | End: 2018-03-02 | Stop reason: SDUPTHER

## 2018-03-02 RX ADMIN — TACROLIMUS 4 MG: 1 CAPSULE ORAL at 08:03

## 2018-03-02 RX ADMIN — MYCOPHENOLATE MOFETIL 1000 MG: 250 CAPSULE ORAL at 08:03

## 2018-03-02 RX ADMIN — HYDROMORPHONE HYDROCHLORIDE 2 MG: 2 TABLET ORAL at 06:03

## 2018-03-02 RX ADMIN — MAGNESIUM OXIDE TAB 400 MG (241.3 MG ELEMENTAL MG) 400 MG: 400 (241.3 MG) TAB at 08:03

## 2018-03-02 RX ADMIN — PREDNISONE 100 MG: 10 TABLET ORAL at 11:03

## 2018-03-02 RX ADMIN — NIFEDIPINE 60 MG: 60 TABLET, FILM COATED, EXTENDED RELEASE ORAL at 08:03

## 2018-03-02 RX ADMIN — GABAPENTIN 300 MG: 300 CAPSULE ORAL at 08:03

## 2018-03-02 RX ADMIN — NYSTATIN 500000 UNITS: 500000 SUSPENSION ORAL at 08:03

## 2018-03-02 NOTE — DISCHARGE SUMMARY
[facility]  Liver Transplant  Discharge Summary      Patient Name: Marcie Bazan  MRN: [id number]  Admission Date: 2018  Hospital Length of Stay: 3 days  Discharge Date and Time:  2018 10:53 AM  Attending Physician: Romeo Shahid MD   Discharging Provider: Rayne Currie PA-C  Primary Care Provider: ODILIA Wall  Post-Operative Day: 66     ORGAN:   LIVER  Disease Etiology: Alcoholic Cirrhosis  Donor Type:    - Brain Death  CDC High Risk:   No  Donor CMV Status:   Donor CMV Status: Negative  Donor HBcAB:   Negative  Donor HCV Status:   Negative  Whole or Partial: Whole Liver  Biliary Anastomosis: End to End  Arterial Anatomy: Standard    Hospital Course: Ms. Marcie Bazan is a 52 y.o. 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 Post operative course complicated by hematoma evac on 18 and neutropenia requiring multiple doses of neupogen. Cellcept, bactrim, and valcyte have been on hold 2 neutropenia. She was admitted for elevated LFT's suspicious for rejection. Liver US  was satisfactory without CBD dilatation. Upon admission, her , and she was given 1 dose of neupogen. Liver biopsy was performed on showing mild ACR. She was treated with SM x 3 (, , and 3/1) with some improvement in LFTs seen. Of significance, liver pathology also showed moderate steatohepatitis. Patient denies alcohol use. PETH from 2/15 negative. Repeat PETH pending from 3/1. A1C was checked, WNL. Lipid panel did reveal elevated cholestrol and triglycerides. Patient endorses drinking sugary drinks at home. She was stared on Lipitor and received education on low fat and low sugar diet.     Of note, neutropenia improved with neupogen x 1 on admit and with steroids (actually now with leukocytosis). Therefore, cellcept was restarted at 1000 mg BID. Valcyte will continue to be held. She will continue to need weekly CMV PCR (most  recent MCV PCR 2/27 negative). She receives monthly pentamadine inhalation (next due 3/14).     Also of note, patient will be discharged home on high dose prednisone taper starting at 100 mg.    On day of discharge, patient feeling well without complaint. LFTs are improving but remain elevated. She will follow up with labs twice next week starting on Monday 3/5. If LFTs do not continue to improve on next weeks labs, she will need repeat liver biopsy.       * No surgery found *       Final Active Diagnoses:    Diagnosis Date Noted POA    PRINCIPAL PROBLEM:  Elevated liver enzymes [R74.8] 02/27/2018 Yes    Pain of left lower extremity [M79.605] 02/27/2018 Yes    Neutropenia [D70.9] 02/16/2018 Yes    Long-term use of immunosuppressant medication [Z79.899] 02/14/2018 Not Applicable    At risk for opportunistic infections [Z91.89] 12/30/2017 Yes    Liver transplant 12/26/2017 for ETOH [Z94.4] 12/26/2017 Not Applicable    Prophylactic immunotherapy [Z29.8] 12/26/2017 Not Applicable    Leukocytosis [D72.829] 10/19/2017 Yes    Anemia of chronic disease [D63.8] 08/27/2015 Yes      Problems Resolved During this Admission:    Diagnosis Date Noted Date Resolved POA       Consults         Status Ordering Provider     Inpatient consult to Registered Dietitian/Nutritionist  Once     Provider:  (Not yet assigned)    Completed BLANK, YARIEL TODD          Pending Diagnostic Studies:     Procedure Component Value Units Date/Time    Phosphatidylethanol (PETH) [[id number]] Collected:  03/01/18 0522    Order Status:  Sent Lab Status:  In process Updated:  03/01/18 0619    Specimen:  Blood from Blood         Significant Diagnostic Studies: Labs:   CMP   Recent Labs  Lab 03/01/18 0522 03/02/18  0525   * 138   K 3.6 3.5    105   CO2 19* 23   * 102   BUN 20 20   CREATININE 1.5* 1.2   CALCIUM 8.9 8.9   PROT 5.6* 5.7*   ALBUMIN 2.3* 2.3*   BILITOT 1.9* 1.5*   ALKPHOS 1,144* 1,165*   * 121*   * 219*    ANIONGAP 13 10   ESTGFRAFRICA 45.2* 59.2*   EGFRNONAA 39.2* 51.4*    and CBC   Recent Labs  Lab 03/01/18  0522 03/02/18  0525   WBC 36.65* 28.72*   HGB 9.1* 9.7*   HCT 27.9* 29.0*    231       The patients clinical status was discussed at multidisplinary rounds, involving transplant surgery, transplant medicine, pharmacy, nursing, nutrition, and social work    Discharged Condition: good    Disposition: Home or Self Care    Follow Up: As above.     Patient Instructions:     Activity as tolerated     Notify your health care provider if you experience any of the following:  temperature >100.4     Notify your health care provider if you experience any of the following:  persistent nausea and vomiting or diarrhea     Notify your health care provider if you experience any of the following:  severe uncontrolled pain     Notify your health care provider if you experience any of the following:  redness, tenderness, or signs of infection (pain, swelling, redness, odor or green/yellow discharge around incision site)     Notify your health care provider if you experience any of the following:  difficulty breathing or increased cough     Notify your health care provider if you experience any of the following:  severe persistent headache     Notify your health care provider if you experience any of the following:  worsening rash     Notify your health care provider if you experience any of the following:  persistent dizziness, light-headedness, or visual disturbances     Notify your health care provider if you experience any of the following:  increased confusion or weakness     Notify your health care provider if you experience any of the following:   Order Comments: For any other concerning signs or symptoms.       Medications:  Reconciled Home Medications:   Current Discharge Medication List      START taking these medications    Details   atorvastatin (LIPITOR) 40 MG tablet Take 1 tablet (40 mg total) by mouth once  daily.  Qty: 90 tablet, Refills: 3      cyclobenzaprine (FLEXERIL) 5 MG tablet Take 1 tablet (5 mg total) by mouth 3 (three) times daily as needed for Muscle spasms.  Qty: 30 tablet, Refills: 0      gabapentin (NEURONTIN) 300 MG capsule Take 1 capsule (300 mg total) by mouth 2 (two) times daily.  Qty: 60 capsule, Refills: 11      mycophenolate (CELLCEPT) 250 mg Cap Take 4 capsules (1,000 mg total) by mouth 2 (two) times daily.  Qty: 240 capsule, Refills: 11      nystatin (MYCOSTATIN) 100,000 unit/mL suspension Take 5 mLs (500,000 Units total) by mouth 3 (three) times daily after meals. STOP 3/21/18  Qty: 473 mL, Refills: 0      predniSONE (DELTASONE) 10 MG tablet Take PO BID: 3/3 80 mg, 3/4 60 mg, 3/5 40 mg, 3/6 20 mg; PO QD: 3/7-3/13 20 mg, 3/14-3/20 15 mg, 3/21-3/27 10 mg, 3/28-4/3 5 mg; STOP 4/4  Qty: 200 tablet, Refills: 0         CONTINUE these medications which have CHANGED    Details   ondansetron (ZOFRAN-ODT) 4 MG TbDL DISSOLVE 1 TABLET BY MOUTH EVERY 8 HOURS AS NEEDED FOR NAUSEA AND VOMITTING  Qty: 12 tablet, Refills: 2      tacrolimus (PROGRAF) 1 MG Cap Take 4 mg PO QAM and 4 mg PO QPM  Qty: 240 capsule, Refills: 11         CONTINUE these medications which have NOT CHANGED    Details   famotidine (PEPCID) 20 MG tablet Take 1 tablet (20 mg total) by mouth every evening.  Qty: 30 tablet, Refills: 11      HYDROmorphone (DILAUDID) 4 MG tablet Take 0.5-1 tablets (2-4 mg total) by mouth every 6 (six) hours as needed.  Qty: 20 tablet, Refills: 0      magnesium oxide (MAG-OX) 400 mg tablet Take 1 tablet (400 mg total) by mouth 2 (two) times daily.  Qty: 60 tablet, Refills: 1      NIFEdipine (PROCARDIA-XL) 60 MG (OSM) 24 hr tablet Take 1 tablet (60 mg total) by mouth once daily.  Qty: 30 tablet, Refills: 11      traZODone (DESYREL) 50 MG tablet Take 0.5 tablets (25 mg total) by mouth every evening.  Qty: 15 tablet, Refills: 11      aspirin (ECOTRIN) 81 MG EC tablet Take 1 tablet (81 mg total) by mouth once  daily.  Qty: 30 tablet, Refills: 11      bisacodyl (DULCOLAX) 5 mg EC tablet Take 1-2 tablets (5-10 mg total) by mouth daily as needed for Constipation.  Qty: 30 tablet, Refills: 0         STOP taking these medications       valGANciclovir (VALCYTE) 450 mg Tab Comments:   Reason for Stopping:             Time spent caring for patient (Greater than 1/2 spent in direct face-to-face contact): > 30 minutes    Rayne Currie PA-C  Liver Transplant  [facility]

## 2018-03-02 NOTE — PLAN OF CARE
Pt AAOx4. VSS.  No TELE.  Complaints of muscle spasms this afternoon.  One time dose muscle relaxer given with some relief obtained. No other complaints today except for chronic left pain.  Gabapentin started for pain of LLE.  Per pt new med seems to be working.  SM dose #3 given today.  LFTs improved on AM labs.  Liver biopsy done yesterday w/ mild ACR and steotohepatitis.    AM labs monitored.  Cellcept restarted, originally held sec neutropenia on admit.  Pt given neupogen.  Pt now w/ leukocytosis.  Lipid panel checked, levels elevated.  Dietary consulted for education on low fat diet.  PETH pending.  All other labs stable.  Fall precautions in place.  Bed lowered and locked. Non-skid socks on feet.  Call bell in reach.  Standard precautions maintained.  No acute events/falls/injuries this shift.  See flowsheet for further assessment findings.  Will monitor.

## 2018-03-02 NOTE — PROGRESS NOTES
Discharge instructions given to and reviewed with pt.  All questions answered.  All understanding verbalized.  Peripheral IV removed without any issues, cath tip intact.  VSS before discharge.  Pt currently waiting on ride home from son.  Will monitor.

## 2018-03-02 NOTE — PROGRESS NOTES
"Discharge Medication Note:    Hospital Course:  Admitted for treatment of rejection.  Received MP x3 doses, will d/c on "high pred taper" per Dr. Shahid (see below).  Of note, lipid panel abnormal, will start Lipitor.      Met with Marcie Bazan at discharge to review discharge medications and to update the blue medication card.       Marcie Bazan   Home Medication Instructions JUAN MANUEL:[id number]    Printed on:03/02/18 5189   Medication Information                      aspirin (ECOTRIN) 81 MG EC tablet  Take 1 tablet (81 mg total) by mouth once daily.             atorvastatin (LIPITOR) 40 MG tablet  Take 1 tablet (40 mg total) by mouth once daily.             bisacodyl (DULCOLAX) 5 mg EC tablet  Take 1-2 tablets (5-10 mg total) by mouth daily as needed for Constipation.             cyclobenzaprine (FLEXERIL) 5 MG tablet  Take 1 tablet (5 mg total) by mouth 3 (three) times daily as needed for Muscle spasms.             famotidine (PEPCID) 20 MG tablet  Take 1 tablet (20 mg total) by mouth every evening.             gabapentin (NEURONTIN) 300 MG capsule  Take 1 capsule (300 mg total) by mouth 2 (two) times daily.             HYDROmorphone (DILAUDID) 4 MG tablet  Take 0.5-1 tablets (2-4 mg total) by mouth every 6 (six) hours as needed.             magnesium oxide (MAG-OX) 400 mg tablet  Take 1 tablet (400 mg total) by mouth 2 (two) times daily.             mycophenolate (CELLCEPT) 250 mg Cap  Take 4 capsules (1,000 mg total) by mouth 2 (two) times daily.             NIFEdipine (PROCARDIA-XL) 60 MG (OSM) 24 hr tablet  Take 1 tablet (60 mg total) by mouth once daily.             nystatin (MYCOSTATIN) 100,000 unit/mL suspension  Take 5 mLs (500,000 Units total) by mouth 3 (three) times daily after meals. STOP 3/21/18             ondansetron (ZOFRAN-ODT) 4 MG TbDL  DISSOLVE 1 TABLET BY MOUTH EVERY 8 HOURS AS NEEDED FOR NAUSEA AND VOMITTING             predniSONE (DELTASONE) 10 MG tablet  Take PO BID: 3/3 80 mg, 3/4 60 " mg, 3/5 40 mg, 3/6 20 mg; PO QD: 3/7-3/13 20 mg, 3/14-3/20 15 mg, 3/21-3/27 10 mg, 3/28-4/3 5 mg; STOP 4/4             tacrolimus (PROGRAF) 1 MG Cap  Take 4 mg PO QAM and 4 mg PO QPM             traZODone (DESYREL) 50 MG tablet  Take 0.5 tablets (25 mg total) by mouth every evening.                 Pt was provided with prescriptions for the following meds:  E-rx: Lipitor, nystatin, Neurontin, Cellcept  Printed rx: Flexeril  Phone-in or faxed rx: none to [facility] per pt request.    The following medications have been placed on HOLD and should be restarted in the outpatient setting (when appropriate): valcyte 2/2 low WBC, check weekly CMV PCRs    Marcie Bazan verbalized understanding and had the opportunity to ask questions.

## 2018-03-02 NOTE — CONSULTS
Food & Nutrition  Education    Diet Education: Low fat, low added sugar  Time Spent: 15 min  Learners: Pt      Nutrition Education provided with handouts: Fat-Restricted Nutrition Therapy, Added Sugars      Comments: Provided and explained handouts regarding reducing fat intake, especially of saturated and trans fats. Also provided handout on sources of added sugars and emphasized limited sugar-sweetened beverages and juices. Pt states juice intake is typically one cup per day. Pt voiced understanding. All questions and concerns answered.      Follow-Up: 1x weekly    Please re-consult as needed.

## 2018-03-02 NOTE — PROGRESS NOTES
Discharge Note:    SW presented to pt's room for follow up and discharge planning. Pt presented as alert and oriented x 4 watching tv. Pt was accompanied alone at time of SW visit. Per medical rounding team, pt will discharge today. Pt will discharge to her home in  under the care of her self and family. Pt will resume Outpatient PT with [facility] ([phone]), pt reports she has already spoken with the center to notify them of her discharge. Pt will discharge with no home needs. Pt reports her son Zaire ([phone]) will be picking her up today and driving her home. Pt has requested assistance from SW to get pt's medical records. SW provided pt with a Release of Information form to request needed records for her  regarding her SS benefits. Pt states she has court on 3/8 regarding this. KARLA faxed completed DANNY to [facility] Records Office ([phone]). KARLA contacted Rep in DANNY office to notify her SW was faxing this and pt will be picking records up in person today. No issues noted. Pt reports coping well with aid of family and staff support and denies any mental health difficulties at this time. No psychosocial barriers to discharge are noted. SW remains available for education, resources, and support as appropriate.

## 2018-03-05 ENCOUNTER — LAB VISIT (OUTPATIENT)
Dept: LAB | Facility: HOSPITAL | Age: 53
End: 2018-03-05
Attending: INTERNAL MEDICINE
Payer: COMMERCIAL

## 2018-03-05 ENCOUNTER — TELEPHONE (OUTPATIENT)
Dept: TRANSPLANT | Facility: CLINIC | Age: 53
End: 2018-03-05

## 2018-03-05 DIAGNOSIS — Z94.4 LIVER REPLACED BY TRANSPLANT: ICD-10-CM

## 2018-03-05 LAB
ALBUMIN SERPL BCP-MCNC: 2.8 G/DL
ALP SERPL-CCNC: 689 U/L
ALT SERPL W/O P-5'-P-CCNC: 206 U/L
ANION GAP SERPL CALC-SCNC: 10 MMOL/L
AST SERPL-CCNC: 96 U/L
BASOPHILS # BLD AUTO: 0 K/UL
BASOPHILS NFR BLD: 0 %
BILIRUB DIRECT SERPL-MCNC: 0.9 MG/DL
BILIRUB SERPL-MCNC: 1.2 MG/DL
BUN SERPL-MCNC: 30 MG/DL
CALCIUM SERPL-MCNC: 9.1 MG/DL
CHLORIDE SERPL-SCNC: 107 MMOL/L
CO2 SERPL-SCNC: 22 MMOL/L
CREAT SERPL-MCNC: 0.9 MG/DL
DIFFERENTIAL METHOD: ABNORMAL
EOSINOPHIL # BLD AUTO: 0 K/UL
EOSINOPHIL NFR BLD: 0 %
ERYTHROCYTE [DISTWIDTH] IN BLOOD BY AUTOMATED COUNT: 18.8 %
EST. GFR  (AFRICAN AMERICAN): >60 ML/MIN/1.73 M^2
EST. GFR  (NON AFRICAN AMERICAN): >60 ML/MIN/1.73 M^2
GLUCOSE SERPL-MCNC: 119 MG/DL
HCT VFR BLD AUTO: 31.5 %
HGB BLD-MCNC: 10.1 G/DL
LYMPHOCYTES # BLD AUTO: 0.8 K/UL
LYMPHOCYTES NFR BLD: 11.8 %
MCH RBC QN AUTO: 27.8 PG
MCHC RBC AUTO-ENTMCNC: 32.1 G/DL
MCV RBC AUTO: 87 FL
MONOCYTES # BLD AUTO: 0.9 K/UL
MONOCYTES NFR BLD: 13.7 %
NEUTROPHILS # BLD AUTO: 4.9 K/UL
NEUTROPHILS NFR BLD: 74.5 %
PLATELET # BLD AUTO: 255 K/UL
PMV BLD AUTO: 10.9 FL
POTASSIUM SERPL-SCNC: 3.3 MMOL/L
PROT SERPL-MCNC: 6.1 G/DL
RBC # BLD AUTO: 3.63 M/UL
SODIUM SERPL-SCNC: 139 MMOL/L
WBC # BLD AUTO: 6.59 K/UL

## 2018-03-05 PROCEDURE — 82248 BILIRUBIN DIRECT: CPT

## 2018-03-05 PROCEDURE — 85025 COMPLETE CBC W/AUTO DIFF WBC: CPT | Mod: PO

## 2018-03-05 PROCEDURE — 80053 COMPREHEN METABOLIC PANEL: CPT

## 2018-03-05 PROCEDURE — 36415 COLL VENOUS BLD VENIPUNCTURE: CPT | Mod: PO

## 2018-03-05 PROCEDURE — 80197 ASSAY OF TACROLIMUS: CPT

## 2018-03-05 RX ORDER — PREDNISONE 10 MG/1
TABLET ORAL
Qty: 50 TABLET | Refills: 0 | Status: CANCELLED | OUTPATIENT
Start: 2018-03-05

## 2018-03-05 NOTE — TELEPHONE ENCOUNTER
----- Message from Scarlett Boyd MA sent at 3/5/2018 12:55 PM CST -----  Contact: pt   is out today, [facility].  ----- Message -----  From: Mely Anguiano  Sent: 3/5/2018  11:33 AM  To: [facility] Staff    She's calling to schedule mammogram, please add orders and advise [phone] (home)

## 2018-03-05 NOTE — TELEPHONE ENCOUNTER
----- Message from Scarlett Boyd MA sent at 3/5/2018 12:55 PM CST -----  Contact: pt   is out today, [facility].  ----- Message -----  From: Mely Anguiano  Sent: 3/5/2018  11:33 AM  To: [facility] Staff    She's calling to schedule mammogram, please add orders and advise [phone] (home)

## 2018-03-05 NOTE — TELEPHONE ENCOUNTER
Returned patient's call, advised she will need to speak to her GYN or PCP to put in an order for Mamogram.

## 2018-03-06 LAB — TACROLIMUS BLD-MCNC: 3.8 NG/ML

## 2018-03-06 NOTE — TELEPHONE ENCOUNTER
Reviewed labs with Dr Tyson  Called patient to let her know to increase Prograf to 5 mg bid and repeat labs on Thursday.

## 2018-03-07 ENCOUNTER — CLINICAL SUPPORT (OUTPATIENT)
Dept: REHABILITATION | Facility: HOSPITAL | Age: 53
End: 2018-03-07
Payer: COMMERCIAL

## 2018-03-07 DIAGNOSIS — R53.81 PHYSICAL DECONDITIONING: Primary | ICD-10-CM

## 2018-03-07 DIAGNOSIS — M54.32 SCIATICA, LEFT SIDE: ICD-10-CM

## 2018-03-07 PROCEDURE — 97110 THERAPEUTIC EXERCISES: CPT

## 2018-03-07 NOTE — PROGRESS NOTES
PHYSICAL THERAPY DAILY NOTE    Referring Provider:  Devon Robles    Diagnosis:         ICD-10-CM ICD-9-CM    1. Physical deconditioning R53.81 799.3    2. Sciatica, left side M54.32 724.3    3.      Sciatica, left side   M54.32    Orders:  Evaluate and Treat    Date of Initial Evaluation: 1/16/18    Visit # 6 of 20    BACKGROUND:  Patient is a 53 y/o female 2 months s/p liver transplant due to alcoholic cirrhosis.  She has previously been seen post-operatively at the [facility] facility in [place] for 2 visits for this condition.  The patient reports that her stomach and legs are always hurting. The patient reports that the pain in her legs feel like burning pins and needles.  She also reports that her L LE feels heavy at times and starts dragging. The patient has a complicated medical history including liver failure, anemia, HTN, leukopenia, and history of alcohol abuse.    Subjective 3/7/18 - Patient reports that she had to be hospitalized last week due to elevation of her liver enzymes post transplant.  She states that her feet and lower legs are sore today due to swelling.    OBJECTIVE:     TREATMENT PROVIDED:  -Therapeutic Exercise:  30 min   - Recumbent bike - 1' x 2   - Sciatic n glides with assist - 20x   - Bridging - 30x   - DKTC with ball - 30x   - Hip abd/add with resistance- 30x   - Nu step - 1' x 3  -Education/HEP: Pt education on condition and how to decrease pain in her LEs    ASSESSMENT:   Patient able to perform her therex with mild increase in pain.  Patient limited in exercise duration by fatigue.  Patient needed frequent rest breaks to complete exercises.    PLAN:  Patient will benefit from physical therapy (2-3) x/week for (4-6) weeks including therapeutic exercise, functional activities, neuromuscular re-education, gait training, modalities, and patient education.

## 2018-03-08 ENCOUNTER — NURSE TRIAGE (OUTPATIENT)
Dept: ADMINISTRATIVE | Facility: CLINIC | Age: 53
End: 2018-03-08

## 2018-03-08 ENCOUNTER — LAB VISIT (OUTPATIENT)
Dept: LAB | Facility: HOSPITAL | Age: 53
End: 2018-03-08
Attending: INTERNAL MEDICINE
Payer: COMMERCIAL

## 2018-03-08 DIAGNOSIS — Z94.4 LIVER REPLACED BY TRANSPLANT: ICD-10-CM

## 2018-03-08 LAB
ALBUMIN SERPL BCP-MCNC: 3 G/DL
ALP SERPL-CCNC: 439 U/L
ALT SERPL W/O P-5'-P-CCNC: 268 U/L
ANION GAP SERPL CALC-SCNC: 10 MMOL/L
AST SERPL-CCNC: 117 U/L
BASOPHILS # BLD AUTO: 0 K/UL
BASOPHILS NFR BLD: 0 %
BILIRUB DIRECT SERPL-MCNC: 0.6 MG/DL
BILIRUB SERPL-MCNC: 1 MG/DL
BUN SERPL-MCNC: 25 MG/DL
CALCIUM SERPL-MCNC: 8.9 MG/DL
CHLORIDE SERPL-SCNC: 108 MMOL/L
CMV DNA SERPL NAA+PROBE-ACNC: NORMAL IU/ML
CO2 SERPL-SCNC: 24 MMOL/L
CREAT SERPL-MCNC: 0.7 MG/DL
DIFFERENTIAL METHOD: ABNORMAL
EOSINOPHIL # BLD AUTO: 0 K/UL
EOSINOPHIL NFR BLD: 0 %
ERYTHROCYTE [DISTWIDTH] IN BLOOD BY AUTOMATED COUNT: 19.7 %
EST. GFR  (AFRICAN AMERICAN): >60 ML/MIN/1.73 M^2
EST. GFR  (NON AFRICAN AMERICAN): >60 ML/MIN/1.73 M^2
GLUCOSE SERPL-MCNC: 117 MG/DL
HCT VFR BLD AUTO: 30.9 %
HGB BLD-MCNC: 10.1 G/DL
LYMPHOCYTES # BLD AUTO: 1.4 K/UL
LYMPHOCYTES NFR BLD: 31.9 %
MCH RBC QN AUTO: 28.6 PG
MCHC RBC AUTO-ENTMCNC: 32.7 G/DL
MCV RBC AUTO: 88 FL
MONOCYTES # BLD AUTO: 0 K/UL
MONOCYTES NFR BLD: 0.7 %
NEUTROPHILS # BLD AUTO: 3 K/UL
NEUTROPHILS NFR BLD: 67.4 %
PLATELET # BLD AUTO: 236 K/UL
PMV BLD AUTO: 10.7 FL
POTASSIUM SERPL-SCNC: 3.3 MMOL/L
PROT SERPL-MCNC: 6 G/DL
RBC # BLD AUTO: 3.53 M/UL
SODIUM SERPL-SCNC: 142 MMOL/L
WBC # BLD AUTO: 4.42 K/UL

## 2018-03-08 PROCEDURE — 80197 ASSAY OF TACROLIMUS: CPT

## 2018-03-08 PROCEDURE — 80053 COMPREHEN METABOLIC PANEL: CPT | Mod: PO

## 2018-03-08 PROCEDURE — 85025 COMPLETE CBC W/AUTO DIFF WBC: CPT | Mod: PO

## 2018-03-08 PROCEDURE — 82248 BILIRUBIN DIRECT: CPT | Mod: PO

## 2018-03-08 PROCEDURE — 36415 COLL VENOUS BLD VENIPUNCTURE: CPT | Mod: PO

## 2018-03-08 RX ORDER — TACROLIMUS 1 MG/1
5 CAPSULE ORAL EVERY 12 HOURS
Qty: 300 CAPSULE | Refills: 11 | Status: ON HOLD | OUTPATIENT
Start: 2018-03-08 | End: 2018-03-17

## 2018-03-09 ENCOUNTER — HOSPITAL ENCOUNTER (INPATIENT)
Facility: HOSPITAL | Age: 53
LOS: 8 days | Discharge: HOME | DRG: 641 | End: 2018-03-17
Attending: TRANSPLANT SURGERY | Admitting: TRANSPLANT SURGERY
Payer: COMMERCIAL

## 2018-03-09 ENCOUNTER — TELEPHONE (OUTPATIENT)
Dept: TRANSPLANT | Facility: CLINIC | Age: 53
End: 2018-03-09

## 2018-03-09 ENCOUNTER — HOSPITAL ENCOUNTER (EMERGENCY)
Facility: HOSPITAL | Age: 53
Discharge: ANOTHER HOSPITAL | End: 2018-03-09
Attending: EMERGENCY MEDICINE
Payer: COMMERCIAL

## 2018-03-09 VITALS
OXYGEN SATURATION: 100 % | DIASTOLIC BLOOD PRESSURE: 72 MMHG | HEART RATE: 75 BPM | BODY MASS INDEX: 26.58 KG/M2 | TEMPERATURE: 98 F | WEIGHT: 150 LBS | SYSTOLIC BLOOD PRESSURE: 129 MMHG | HEIGHT: 63 IN | RESPIRATION RATE: 16 BRPM

## 2018-03-09 DIAGNOSIS — R06.02 SOB (SHORTNESS OF BREATH): ICD-10-CM

## 2018-03-09 DIAGNOSIS — R10.9 ABDOMINAL PAIN, UNSPECIFIED ABDOMINAL LOCATION: ICD-10-CM

## 2018-03-09 DIAGNOSIS — T86.40 COMPLICATION OF TRANSPLANTED LIVER, UNSPECIFIED COMPLICATION: Primary | ICD-10-CM

## 2018-03-09 DIAGNOSIS — R60.9 EDEMA, UNSPECIFIED TYPE: ICD-10-CM

## 2018-03-09 DIAGNOSIS — E87.6 HYPOKALEMIA: ICD-10-CM

## 2018-03-09 DIAGNOSIS — R74.8 ELEVATED LIVER ENZYMES: ICD-10-CM

## 2018-03-09 DIAGNOSIS — R18.8 OTHER ASCITES: ICD-10-CM

## 2018-03-09 DIAGNOSIS — Z94.4 LIVER REPLACED BY TRANSPLANT: ICD-10-CM

## 2018-03-09 DIAGNOSIS — E83.42 HYPOMAGNESEMIA: ICD-10-CM

## 2018-03-09 DIAGNOSIS — R06.02 SHORTNESS OF BREATH: ICD-10-CM

## 2018-03-09 DIAGNOSIS — Z91.89 AT RISK FOR OPPORTUNISTIC INFECTIONS: ICD-10-CM

## 2018-03-09 DIAGNOSIS — D63.8 ANEMIA OF CHRONIC DISEASE: ICD-10-CM

## 2018-03-09 DIAGNOSIS — Z79.60 LONG-TERM USE OF IMMUNOSUPPRESSANT MEDICATION: ICD-10-CM

## 2018-03-09 DIAGNOSIS — Z29.89 PROPHYLACTIC IMMUNOTHERAPY: ICD-10-CM

## 2018-03-09 DIAGNOSIS — Z94.4 S/P LIVER TRANSPLANT: ICD-10-CM

## 2018-03-09 DIAGNOSIS — T86.41: Primary | ICD-10-CM

## 2018-03-09 DIAGNOSIS — R10.9 ABDOMINAL PAIN: ICD-10-CM

## 2018-03-09 DIAGNOSIS — F10.11 HISTORY OF ALCOHOL ABUSE: ICD-10-CM

## 2018-03-09 PROBLEM — R53.81 PHYSICAL DECONDITIONING: Status: RESOLVED | Noted: 2018-01-02 | Resolved: 2018-03-09

## 2018-03-09 PROBLEM — D70.2 NEUTROPENIA, DRUG-INDUCED: Status: RESOLVED | Noted: 2018-02-12 | Resolved: 2018-03-09

## 2018-03-09 PROBLEM — D69.6 THROMBOCYTOPENIA: Status: RESOLVED | Noted: 2017-11-25 | Resolved: 2018-03-09

## 2018-03-09 PROBLEM — R50.81 NEUTROPENIC FEVER: Status: RESOLVED | Noted: 2018-02-12 | Resolved: 2018-03-09

## 2018-03-09 PROBLEM — R04.2 HEMOPTYSIS: Status: RESOLVED | Noted: 2017-02-09 | Resolved: 2018-03-09

## 2018-03-09 PROBLEM — D72.829 LEUKOCYTOSIS: Status: RESOLVED | Noted: 2017-10-19 | Resolved: 2018-03-09

## 2018-03-09 PROBLEM — D70.9 NEUTROPENIA: Status: RESOLVED | Noted: 2018-02-16 | Resolved: 2018-03-09

## 2018-03-09 PROBLEM — M79.605 PAIN OF LEFT LOWER EXTREMITY: Status: RESOLVED | Noted: 2018-02-27 | Resolved: 2018-03-09

## 2018-03-09 PROBLEM — R53.1 WEAKNESS: Status: RESOLVED | Noted: 2018-01-11 | Resolved: 2018-03-09

## 2018-03-09 PROBLEM — D70.9 NEUTROPENIC FEVER: Status: RESOLVED | Noted: 2018-02-12 | Resolved: 2018-03-09

## 2018-03-09 LAB
ALBUMIN SERPL BCP-MCNC: 2.7 G/DL
ALBUMIN SERPL BCP-MCNC: 2.9 G/DL
ALBUMIN SERPL BCP-MCNC: 3 G/DL
ALP SERPL-CCNC: 371 U/L
ALP SERPL-CCNC: 389 U/L
ALP SERPL-CCNC: 452 U/L
ALT SERPL W/O P-5'-P-CCNC: 289 U/L
ALT SERPL W/O P-5'-P-CCNC: 308 U/L
ALT SERPL W/O P-5'-P-CCNC: 368 U/L
AMMONIA PLAS-SCNC: 30 UMOL/L
ANION GAP SERPL CALC-SCNC: 11 MMOL/L
ANION GAP SERPL CALC-SCNC: 8 MMOL/L
ANION GAP SERPL CALC-SCNC: 9 MMOL/L
AST SERPL-CCNC: 134 U/L
AST SERPL-CCNC: 163 U/L
AST SERPL-CCNC: 209 U/L
BASOPHILS # BLD AUTO: 0 K/UL
BASOPHILS # BLD AUTO: 0 K/UL
BASOPHILS NFR BLD: 0 %
BASOPHILS NFR BLD: 0 %
BILIRUB SERPL-MCNC: 1.1 MG/DL
BILIRUB SERPL-MCNC: 1.2 MG/DL
BILIRUB SERPL-MCNC: 1.5 MG/DL
BILIRUB UR QL STRIP: NEGATIVE
BILIRUB UR QL STRIP: NEGATIVE
BNP SERPL-MCNC: 117 PG/ML
BUN SERPL-MCNC: 21 MG/DL
BUN SERPL-MCNC: 21 MG/DL
BUN SERPL-MCNC: 22 MG/DL
CALCIUM SERPL-MCNC: 8.3 MG/DL
CALCIUM SERPL-MCNC: 8.6 MG/DL
CALCIUM SERPL-MCNC: 8.7 MG/DL
CHLORIDE SERPL-SCNC: 105 MMOL/L
CHLORIDE SERPL-SCNC: 106 MMOL/L
CHLORIDE SERPL-SCNC: 108 MMOL/L
CLARITY UR REFRACT.AUTO: CLEAR
CLARITY UR: CLEAR
CMV DNA SERPL NAA+PROBE-ACNC: NORMAL IU/ML
CO2 SERPL-SCNC: 22 MMOL/L
CO2 SERPL-SCNC: 25 MMOL/L
CO2 SERPL-SCNC: 29 MMOL/L
COLOR UR AUTO: YELLOW
COLOR UR: YELLOW
CREAT SERPL-MCNC: 0.7 MG/DL
CREAT SERPL-MCNC: 0.8 MG/DL
CREAT SERPL-MCNC: 0.8 MG/DL
DIASTOLIC DYSFUNCTION: NO
DIFFERENTIAL METHOD: ABNORMAL
DIFFERENTIAL METHOD: ABNORMAL
EOSINOPHIL # BLD AUTO: 0 K/UL
EOSINOPHIL # BLD AUTO: 0 K/UL
EOSINOPHIL NFR BLD: 0.2 %
EOSINOPHIL NFR BLD: 0.3 %
ERYTHROCYTE [DISTWIDTH] IN BLOOD BY AUTOMATED COUNT: 20.3 %
ERYTHROCYTE [DISTWIDTH] IN BLOOD BY AUTOMATED COUNT: 20.7 %
EST. GFR  (AFRICAN AMERICAN): >60 ML/MIN/1.73 M^2
EST. GFR  (NON AFRICAN AMERICAN): >60 ML/MIN/1.73 M^2
ESTIMATED PA SYSTOLIC PRESSURE: 29.83
FLUAV AG SPEC QL IA: NEGATIVE
FLUBV AG SPEC QL IA: NEGATIVE
GLUCOSE SERPL-MCNC: 121 MG/DL
GLUCOSE SERPL-MCNC: 87 MG/DL
GLUCOSE SERPL-MCNC: 92 MG/DL
GLUCOSE UR QL STRIP: NEGATIVE
GLUCOSE UR QL STRIP: NEGATIVE
HCT VFR BLD AUTO: 30.6 %
HCT VFR BLD AUTO: 30.9 %
HGB BLD-MCNC: 9.9 G/DL
HGB BLD-MCNC: 9.9 G/DL
HGB UR QL STRIP: NEGATIVE
HGB UR QL STRIP: NEGATIVE
IMM GRANULOCYTES # BLD AUTO: 0.03 K/UL
IMM GRANULOCYTES NFR BLD AUTO: 0.9 %
INR PPP: 1
INR PPP: 1
KETONES UR QL STRIP: NEGATIVE
KETONES UR QL STRIP: NEGATIVE
LACTATE SERPL-SCNC: 1.9 MMOL/L
LEUKOCYTE ESTERASE UR QL STRIP: NEGATIVE
LEUKOCYTE ESTERASE UR QL STRIP: NEGATIVE
LYMPHOCYTES # BLD AUTO: 1.3 K/UL
LYMPHOCYTES # BLD AUTO: 1.6 K/UL
LYMPHOCYTES NFR BLD: 29.3 %
LYMPHOCYTES NFR BLD: 36.3 %
MAGNESIUM SERPL-MCNC: 1.1 MG/DL
MAGNESIUM SERPL-MCNC: 1.4 MG/DL
MCH RBC QN AUTO: 28.6 PG
MCH RBC QN AUTO: 28.7 PG
MCHC RBC AUTO-ENTMCNC: 32 G/DL
MCHC RBC AUTO-ENTMCNC: 32.4 G/DL
MCV RBC AUTO: 88 FL
MCV RBC AUTO: 90 FL
MONOCYTES # BLD AUTO: 0.8 K/UL
MONOCYTES # BLD AUTO: 1 K/UL
MONOCYTES NFR BLD: 17.6 %
MONOCYTES NFR BLD: 22 %
NEUTROPHILS # BLD AUTO: 1.4 K/UL
NEUTROPHILS # BLD AUTO: 2.9 K/UL
NEUTROPHILS NFR BLD: 40.5 %
NEUTROPHILS NFR BLD: 52.9 %
NITRITE UR QL STRIP: NEGATIVE
NITRITE UR QL STRIP: NEGATIVE
NRBC BLD-RTO: 0 /100 WBC
PH UR STRIP: 5 [PH] (ref 5–8)
PH UR STRIP: 6 [PH] (ref 5–8)
PHOSPHATE SERPL-MCNC: 2.2 MG/DL
PHOSPHATE SERPL-MCNC: 2.7 MG/DL
PHOSPHATIDYLETHANOL (PETH): NEGATIVE NG/ML
PLATELET # BLD AUTO: 207 K/UL
PLATELET # BLD AUTO: 213 K/UL
PMV BLD AUTO: 10.3 FL
PMV BLD AUTO: 11 FL
POTASSIUM SERPL-SCNC: 2.6 MMOL/L
POTASSIUM SERPL-SCNC: 2.9 MMOL/L
POTASSIUM SERPL-SCNC: 3.6 MMOL/L
PROT SERPL-MCNC: 5.5 G/DL
PROT SERPL-MCNC: 6.1 G/DL
PROT SERPL-MCNC: 6.1 G/DL
PROT UR QL STRIP: NEGATIVE
PROT UR QL STRIP: NEGATIVE
PROTHROMBIN TIME: 10.5 SEC
PROTHROMBIN TIME: 10.6 SEC
RBC # BLD AUTO: 3.45 M/UL
RBC # BLD AUTO: 3.46 M/UL
RETIRED EF AND QEF - SEE NOTES: 65 (ref 55–65)
SODIUM SERPL-SCNC: 139 MMOL/L
SODIUM SERPL-SCNC: 141 MMOL/L
SODIUM SERPL-SCNC: 143 MMOL/L
SP GR UR STRIP: 1.01 (ref 1–1.03)
SP GR UR STRIP: 1.01 (ref 1–1.03)
SPECIMEN SOURCE: NORMAL
T4 FREE SERPL-MCNC: 0.82 NG/DL
TACROLIMUS BLD-MCNC: 6.8 NG/ML
TRICUSPID VALVE REGURGITATION: NORMAL
TROPONIN I SERPL DL<=0.01 NG/ML-MCNC: <0.006 NG/ML
TROPONIN I SERPL DL<=0.01 NG/ML-MCNC: <0.006 NG/ML
TSH SERPL DL<=0.005 MIU/L-ACNC: 3.97 UIU/ML
URN SPEC COLLECT METH UR: NORMAL
URN SPEC COLLECT METH UR: NORMAL
UROBILINOGEN UR STRIP-ACNC: NEGATIVE EU/DL
UROBILINOGEN UR STRIP-ACNC: NEGATIVE EU/DL
WBC # BLD AUTO: 3.5 K/UL
WBC # BLD AUTO: 5.5 K/UL

## 2018-03-09 PROCEDURE — 96374 THER/PROPH/DIAG INJ IV PUSH: CPT

## 2018-03-09 PROCEDURE — 87040 BLOOD CULTURE FOR BACTERIA: CPT

## 2018-03-09 PROCEDURE — 36415 COLL VENOUS BLD VENIPUNCTURE: CPT

## 2018-03-09 PROCEDURE — 81003 URINALYSIS AUTO W/O SCOPE: CPT | Mod: 91

## 2018-03-09 PROCEDURE — 93010 ELECTROCARDIOGRAM REPORT: CPT | Mod: ,,, | Performed by: INTERNAL MEDICINE

## 2018-03-09 PROCEDURE — 25000003 PHARM REV CODE 250: Performed by: PHYSICIAN ASSISTANT

## 2018-03-09 PROCEDURE — 93005 ELECTROCARDIOGRAM TRACING: CPT

## 2018-03-09 PROCEDURE — 83735 ASSAY OF MAGNESIUM: CPT | Mod: 91

## 2018-03-09 PROCEDURE — 85025 COMPLETE CBC W/AUTO DIFF WBC: CPT

## 2018-03-09 PROCEDURE — 83880 ASSAY OF NATRIURETIC PEPTIDE: CPT

## 2018-03-09 PROCEDURE — 63600175 PHARM REV CODE 636 W HCPCS: Performed by: PHYSICIAN ASSISTANT

## 2018-03-09 PROCEDURE — 25000003 PHARM REV CODE 250: Performed by: PEDIATRICS

## 2018-03-09 PROCEDURE — 83735 ASSAY OF MAGNESIUM: CPT

## 2018-03-09 PROCEDURE — 84484 ASSAY OF TROPONIN QUANT: CPT | Mod: 91

## 2018-03-09 PROCEDURE — 87040 BLOOD CULTURE FOR BACTERIA: CPT | Mod: 59

## 2018-03-09 PROCEDURE — 80053 COMPREHEN METABOLIC PANEL: CPT | Mod: 91

## 2018-03-09 PROCEDURE — 93306 TTE W/DOPPLER COMPLETE: CPT | Mod: 26,,, | Performed by: INTERNAL MEDICINE

## 2018-03-09 PROCEDURE — 87400 INFLUENZA A/B EACH AG IA: CPT | Mod: 59

## 2018-03-09 PROCEDURE — 87086 URINE CULTURE/COLONY COUNT: CPT | Mod: 59

## 2018-03-09 PROCEDURE — 82140 ASSAY OF AMMONIA: CPT

## 2018-03-09 PROCEDURE — 84100 ASSAY OF PHOSPHORUS: CPT

## 2018-03-09 PROCEDURE — 63600175 PHARM REV CODE 636 W HCPCS: Performed by: EMERGENCY MEDICINE

## 2018-03-09 PROCEDURE — 99223 1ST HOSP IP/OBS HIGH 75: CPT | Mod: 24,,, | Performed by: PHYSICIAN ASSISTANT

## 2018-03-09 PROCEDURE — 84443 ASSAY THYROID STIM HORMONE: CPT

## 2018-03-09 PROCEDURE — 99285 EMERGENCY DEPT VISIT HI MDM: CPT | Mod: 25

## 2018-03-09 PROCEDURE — 25000003 PHARM REV CODE 250: Performed by: EMERGENCY MEDICINE

## 2018-03-09 PROCEDURE — 85610 PROTHROMBIN TIME: CPT | Mod: 91

## 2018-03-09 PROCEDURE — 63600175 PHARM REV CODE 636 W HCPCS: Performed by: PEDIATRICS

## 2018-03-09 PROCEDURE — 87086 URINE CULTURE/COLONY COUNT: CPT

## 2018-03-09 PROCEDURE — 85610 PROTHROMBIN TIME: CPT

## 2018-03-09 PROCEDURE — 20600001 HC STEP DOWN PRIVATE ROOM

## 2018-03-09 PROCEDURE — 84100 ASSAY OF PHOSPHORUS: CPT | Mod: 91

## 2018-03-09 PROCEDURE — 83605 ASSAY OF LACTIC ACID: CPT

## 2018-03-09 PROCEDURE — 80053 COMPREHEN METABOLIC PANEL: CPT

## 2018-03-09 PROCEDURE — 85025 COMPLETE CBC W/AUTO DIFF WBC: CPT | Mod: 91

## 2018-03-09 PROCEDURE — 81003 URINALYSIS AUTO W/O SCOPE: CPT

## 2018-03-09 PROCEDURE — 84439 ASSAY OF FREE THYROXINE: CPT

## 2018-03-09 PROCEDURE — 84484 ASSAY OF TROPONIN QUANT: CPT

## 2018-03-09 PROCEDURE — 93306 TTE W/DOPPLER COMPLETE: CPT

## 2018-03-09 RX ORDER — NYSTATIN 100000 [USP'U]/ML
500000 SUSPENSION ORAL
Status: DISCONTINUED | OUTPATIENT
Start: 2018-03-09 | End: 2018-03-17 | Stop reason: HOSPADM

## 2018-03-09 RX ORDER — NIFEDIPINE 60 MG/1
60 TABLET, EXTENDED RELEASE ORAL DAILY
Status: DISCONTINUED | OUTPATIENT
Start: 2018-03-09 | End: 2018-03-17 | Stop reason: HOSPADM

## 2018-03-09 RX ORDER — MAGNESIUM SULFATE/D5W 2 G/50 ML
2 INTRAVENOUS SOLUTION, PIGGYBACK (ML) INTRAVENOUS ONCE
Status: COMPLETED | OUTPATIENT
Start: 2018-03-09 | End: 2018-03-09

## 2018-03-09 RX ORDER — ACETAMINOPHEN 325 MG/1
650 TABLET ORAL EVERY 8 HOURS PRN
Status: DISCONTINUED | OUTPATIENT
Start: 2018-03-09 | End: 2018-03-17 | Stop reason: HOSPADM

## 2018-03-09 RX ORDER — FUROSEMIDE 10 MG/ML
40 INJECTION INTRAMUSCULAR; INTRAVENOUS
Status: COMPLETED | OUTPATIENT
Start: 2018-03-09 | End: 2018-03-09

## 2018-03-09 RX ORDER — ATORVASTATIN CALCIUM 20 MG/1
40 TABLET, FILM COATED ORAL DAILY
Status: DISCONTINUED | OUTPATIENT
Start: 2018-03-09 | End: 2018-03-17 | Stop reason: HOSPADM

## 2018-03-09 RX ORDER — TACROLIMUS 1 MG/1
6 CAPSULE ORAL 2 TIMES DAILY
Status: DISCONTINUED | OUTPATIENT
Start: 2018-03-10 | End: 2018-03-14

## 2018-03-09 RX ORDER — HEPARIN SODIUM 5000 [USP'U]/ML
5000 INJECTION, SOLUTION INTRAVENOUS; SUBCUTANEOUS EVERY 8 HOURS
Status: DISCONTINUED | OUTPATIENT
Start: 2018-03-09 | End: 2018-03-17 | Stop reason: HOSPADM

## 2018-03-09 RX ORDER — TACROLIMUS 1 MG/1
1 CAPSULE ORAL ONCE
Status: COMPLETED | OUTPATIENT
Start: 2018-03-09 | End: 2018-03-09

## 2018-03-09 RX ORDER — ASPIRIN 81 MG/1
81 TABLET ORAL DAILY
Status: DISCONTINUED | OUTPATIENT
Start: 2018-03-09 | End: 2018-03-17 | Stop reason: HOSPADM

## 2018-03-09 RX ORDER — HYDROMORPHONE HYDROCHLORIDE 2 MG/1
4 TABLET ORAL
Status: COMPLETED | OUTPATIENT
Start: 2018-03-09 | End: 2018-03-09

## 2018-03-09 RX ORDER — PREDNISONE 10 MG/1
20 TABLET ORAL DAILY
Status: DISCONTINUED | OUTPATIENT
Start: 2018-03-09 | End: 2018-03-09

## 2018-03-09 RX ORDER — FAMOTIDINE 20 MG/1
20 TABLET, FILM COATED ORAL NIGHTLY
Status: DISCONTINUED | OUTPATIENT
Start: 2018-03-09 | End: 2018-03-17 | Stop reason: HOSPADM

## 2018-03-09 RX ORDER — POTASSIUM CHLORIDE 20 MEQ/15ML
40 SOLUTION ORAL
Status: COMPLETED | OUTPATIENT
Start: 2018-03-09 | End: 2018-03-09

## 2018-03-09 RX ORDER — DIPHENHYDRAMINE HYDROCHLORIDE 50 MG/ML
25 INJECTION INTRAMUSCULAR; INTRAVENOUS EVERY 4 HOURS PRN
Status: DISCONTINUED | OUTPATIENT
Start: 2018-03-09 | End: 2018-03-17 | Stop reason: HOSPADM

## 2018-03-09 RX ORDER — BISACODYL 5 MG
5 TABLET, DELAYED RELEASE (ENTERIC COATED) ORAL DAILY PRN
Status: DISCONTINUED | OUTPATIENT
Start: 2018-03-09 | End: 2018-03-17 | Stop reason: HOSPADM

## 2018-03-09 RX ORDER — GABAPENTIN 300 MG/1
300 CAPSULE ORAL 2 TIMES DAILY
Status: DISCONTINUED | OUTPATIENT
Start: 2018-03-09 | End: 2018-03-17 | Stop reason: HOSPADM

## 2018-03-09 RX ORDER — TACROLIMUS 1 MG/1
5 CAPSULE ORAL 2 TIMES DAILY
Status: DISCONTINUED | OUTPATIENT
Start: 2018-03-09 | End: 2018-03-09

## 2018-03-09 RX ORDER — SODIUM CHLORIDE 0.9 % (FLUSH) 0.9 %
3 SYRINGE (ML) INJECTION
Status: DISCONTINUED | OUTPATIENT
Start: 2018-03-09 | End: 2018-03-17 | Stop reason: HOSPADM

## 2018-03-09 RX ORDER — LANOLIN ALCOHOL/MO/W.PET/CERES
400 CREAM (GRAM) TOPICAL 2 TIMES DAILY
Status: DISCONTINUED | OUTPATIENT
Start: 2018-03-09 | End: 2018-03-13

## 2018-03-09 RX ORDER — ONDANSETRON 4 MG/1
4 TABLET, ORALLY DISINTEGRATING ORAL EVERY 8 HOURS PRN
Status: DISCONTINUED | OUTPATIENT
Start: 2018-03-09 | End: 2018-03-17 | Stop reason: HOSPADM

## 2018-03-09 RX ORDER — HYDROMORPHONE HYDROCHLORIDE 2 MG/1
2 TABLET ORAL EVERY 6 HOURS PRN
Status: DISCONTINUED | OUTPATIENT
Start: 2018-03-09 | End: 2018-03-10

## 2018-03-09 RX ORDER — CYCLOBENZAPRINE HCL 5 MG
5 TABLET ORAL 3 TIMES DAILY PRN
Status: DISCONTINUED | OUTPATIENT
Start: 2018-03-09 | End: 2018-03-17 | Stop reason: HOSPADM

## 2018-03-09 RX ORDER — MYCOPHENOLATE MOFETIL 250 MG/1
1000 CAPSULE ORAL 2 TIMES DAILY
Status: DISCONTINUED | OUTPATIENT
Start: 2018-03-09 | End: 2018-03-17 | Stop reason: HOSPADM

## 2018-03-09 RX ADMIN — MAGNESIUM OXIDE TAB 400 MG (241.3 MG ELEMENTAL MG) 400 MG: 400 (241.3 MG) TAB at 09:03

## 2018-03-09 RX ADMIN — TRAZODONE HYDROCHLORIDE 25 MG: 50 TABLET ORAL at 09:03

## 2018-03-09 RX ADMIN — FAMOTIDINE 20 MG: 20 TABLET, FILM COATED ORAL at 09:03

## 2018-03-09 RX ADMIN — NIFEDIPINE 60 MG: 60 TABLET, FILM COATED, EXTENDED RELEASE ORAL at 06:03

## 2018-03-09 RX ADMIN — PREDNISONE 20 MG: 10 TABLET ORAL at 06:03

## 2018-03-09 RX ADMIN — FUROSEMIDE 40 MG: 10 INJECTION, SOLUTION INTRAMUSCULAR; INTRAVENOUS at 03:03

## 2018-03-09 RX ADMIN — ASPIRIN 81 MG: 81 TABLET, COATED ORAL at 06:03

## 2018-03-09 RX ADMIN — ATORVASTATIN CALCIUM 40 MG: 20 TABLET, FILM COATED ORAL at 06:03

## 2018-03-09 RX ADMIN — Medication 2 G: at 06:03

## 2018-03-09 RX ADMIN — HYDROMORPHONE HYDROCHLORIDE 4 MG: 2 TABLET ORAL at 05:03

## 2018-03-09 RX ADMIN — TACROLIMUS 5 MG: 1 CAPSULE ORAL at 06:03

## 2018-03-09 RX ADMIN — MYCOPHENOLATE MOFETIL 1000 MG: 250 CAPSULE ORAL at 09:03

## 2018-03-09 RX ADMIN — DEXTROSE MONOHYDRATE 30 MMOL: 5 INJECTION, SOLUTION INTRAVENOUS at 06:03

## 2018-03-09 RX ADMIN — NYSTATIN 500000 UNITS: 500000 SUSPENSION ORAL at 06:03

## 2018-03-09 RX ADMIN — POTASSIUM CHLORIDE 40 MEQ: 20 SOLUTION ORAL at 04:03

## 2018-03-09 RX ADMIN — DEXTROSE: 50 INJECTION, SOLUTION INTRAVENOUS at 11:03

## 2018-03-09 RX ADMIN — TACROLIMUS 1 MG: 1 CAPSULE ORAL at 11:03

## 2018-03-09 RX ADMIN — GABAPENTIN 300 MG: 300 CAPSULE ORAL at 09:03

## 2018-03-09 NOTE — ASSESSMENT & PLAN NOTE
Pt reported gaining approx 30 lbs in 8 hours yesterday  She was given lasix at OSH   Still has some lower extremity edema as well as ascites, but reports it has significantly improved.   , EKG and Echo ordered  CXR appears clear.   Infectious lópez, US liver, and thyroid studies ordered.

## 2018-03-09 NOTE — ED NOTES
Called for update from transfer center, they state there has still been no bed assigned. They will reach out to house supervisor for an update.

## 2018-03-09 NOTE — NURSING
Pt provided with specimen cup and informed of the need for a urine sample. Pt to call once sample collected.

## 2018-03-09 NOTE — ED NOTES
Pt has got a room assigned 7764 at [facility]. Call [phone] for report. Pat in transfer center will arrange transport.

## 2018-03-09 NOTE — ED NOTES
Pt aware of transfer to [facility] to transplant team.  Dr Castaneda accepted - pt to be a direct admit.

## 2018-03-09 NOTE — HPI
Ms. Marcie Bazan is a 52 y.o. 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 Post operative course complicated by hematoma evac on 1/1/18 and neutropenia requiring multiple doses of neupogen. Pt recently admitted with rejection. Liver biopsy was performed on showing mild ACR. She was treated with SM x 3 (2/27, 2/28, and 3/1) with some improvement in LFTs seen. Of significance, liver pathology also showed moderate steatohepatitis. PETH from 2/15 negative.     Pt is being admitted as transfer from [facility] for acute volume overload. Pt reported gaining approx 30 lbs in 8 hours prior to presenting to the ED. She noticed swelling especially in her abdomen and legs with some SOB. She was given lasix at OSH prior to being transferred to [facility]. Upon arrival pt reports feeling much better, she still has some lower extremity edema as well as ascites, but reports it has significantly improved. She denies fever, nausea, vomiting, SOB, CP, abdominal pain, diarrhea. She has not taken any new medications nor eaten any new foods. , labs this morning significant for elevated LFTs and hypokalemia. CXR appears clear. Repeat labs, infectious lópez, echo, US liver, EKG and thyroid studies ordered.

## 2018-03-09 NOTE — ED NOTES
Level of Consciousness: The patient is awake, alert, and oriented with appropriate affect and speech; oriented to person, place and time.  Appearance: Sitting up in bed with no acute distress noted. Clothing and hygiene are clean and worn appropriately.  Skin: Skin is warm and dry with good skin turgor; intact; color consistent with ethnicity.  Mucous membranes are moist.   Musculoskeletal: Moves all extremities well in full range of motion. No obvious deformities. +3 pitting edema to right lower extremity.   Respiratory: Airway open and patent, respirations spontaneous, even and unlabored. No accessory muscles in use.   Cardiac: Regular rate and rhythm, good pulses palpated peripherally, capillary refill < 3 seconds.  Abdomen: Soft, non-tender to palpation. Mild distention noted.  Neurologic: PERRLA, face exhibits symmetrical expression, hand grasps equal and even bilaterally, normal sensation noted to all extremities and face when touched by a finger.

## 2018-03-09 NOTE — TELEPHONE ENCOUNTER
----- Message from Joselin Souza MD sent at 3/9/2018  1:25 PM CST -----  Increase tacrolimus to 6 milligrams in the morning and 5mg in the evening

## 2018-03-09 NOTE — ED NOTES
Pt states started with swelling yesterday to legs and was feeling more tired.   Armband checked, patient verified. Verified by spelling and stated name on armband along with .   LOC: The patient is awake, alert and aware of environment with an appropriate affect, the patient is oriented x 3 and speaking appropriately.  APPEARANCE: Patient in no acute distress, patient is clean and well groomed  SKIN: The skin is warm and dry, color consistent with ethnicity, patient has normal skin turgor and moist mucus membranes,  bruising noted to both arms from frequent IV sticks. Pt had liver transplant 2017.   MUSCULOSKELETAL: Patient moving all extremities to command. Pt with edema to abd and lower extremities.   RESPIRATORY: Airway is open and patent, respirations are regular, even and non labored.  CARDIAC: Patient has a normal rate, no periphreal edema noted, capillary refill < 3 seconds.  ABDOMEN: Soft and non tender to palpation.

## 2018-03-09 NOTE — PLAN OF CARE
Problem: Patient Care Overview  Goal: Plan of Care Review  Outcome: Ongoing (interventions implemented as appropriate)  Pt transferred from [facility]. Pt AAO x4 throughout shift. Labs drawn. Pt given specimen cup for urine sample. K+ 2.9 2g Mag sulfate and K+ Phos ordered. Pt had liver ultrasound today. Pt up ad enid throughout shift. Regular diet - pt very eager to eat when arrived to floor.

## 2018-03-09 NOTE — H&P
[facility]  Kidney Transplant  H&P      Subjective:     Chief Complaint/Reason for Admission:     History of Present Illness:  Ms. Marcie Bazan is a 52 y.o. 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 Post operative course complicated by hematoma evac on 1/1/18 and neutropenia requiring multiple doses of neupogen. Pt recently admitted with rejection. Liver biopsy was performed on showing mild ACR. She was treated with SM x 3 (2/27, 2/28, and 3/1) with some improvement in LFTs seen. Of significance, liver pathology also showed moderate steatohepatitis. PETH from 2/15 negative.     Pt is being admitted as transfer from [facility] for acute volume overload. Pt reported gaining approx 30 lbs in 8 hours prior to presenting to the ED. She noticed swelling especially in her abdomen and legs with some SOB. She was given lasix at OSH prior to being transferred to [facility]. Upon arrival pt reports feeling much better, she still has some lower extremity edema as well as ascites, but reports it has significantly improved. She denies fever, nausea, vomiting, SOB, CP, abdominal pain, diarrhea. She has not taken any new medications nor eaten any new foods. , labs this morning significant for elevated LFTs and hypokalemia. CXR appears clear. Repeat labs, infectious lópez, echo, US liver, EKG and thyroid studies ordered.        PTA Medications   Medication Sig    aspirin (ECOTRIN) 81 MG EC tablet Take 1 tablet (81 mg total) by mouth once daily.    atorvastatin (LIPITOR) 40 MG tablet Take 1 tablet (40 mg total) by mouth once daily.    bisacodyl (DULCOLAX) 5 mg EC tablet Take 1-2 tablets (5-10 mg total) by mouth daily as needed for Constipation.    cyclobenzaprine (FLEXERIL) 5 MG tablet Take 1 tablet (5 mg total) by mouth 3 (three) times daily as needed for Muscle spasms.    famotidine (PEPCID) 20 MG tablet Take 1 tablet (20 mg total) by mouth every evening.     gabapentin (NEURONTIN) 300 MG capsule Take 1 capsule (300 mg total) by mouth 2 (two) times daily.    HYDROmorphone (DILAUDID) 4 MG tablet Take 0.5-1 tablets (2-4 mg total) by mouth every 6 (six) hours as needed.    magnesium oxide (MAG-OX) 400 mg tablet Take 1 tablet (400 mg total) by mouth 2 (two) times daily.    mycophenolate (CELLCEPT) 250 mg Cap Take 4 capsules (1,000 mg total) by mouth 2 (two) times daily.    NIFEdipine (PROCARDIA-XL) 60 MG (OSM) 24 hr tablet Take 1 tablet (60 mg total) by mouth once daily.    nystatin (MYCOSTATIN) 100,000 unit/mL suspension Take 5 mLs (500,000 Units total) by mouth 3 (three) times daily after meals. STOP 3/21/18    ondansetron (ZOFRAN-ODT) 4 MG TbDL DISSOLVE 1 TABLET BY MOUTH EVERY 8 HOURS AS NEEDED FOR NAUSEA AND VOMITTING    predniSONE (DELTASONE) 10 MG tablet Take PO BID: 3/3 80 mg, 3/4 60 mg, 3/5 40 mg, 3/6 20 mg; PO QD: 3/7-3/13 20 mg, 3/14-3/20 15 mg, 3/21-3/27 10 mg, 3/28-4/3 5 mg; STOP 4/4    tacrolimus (PROGRAF) 1 MG Cap Take 5 capsules (5 mg total) by mouth every 12 (twelve) hours.    traZODone (DESYREL) 50 MG tablet Take 0.5 tablets (25 mg total) by mouth every evening.       Review of patient's allergies indicates:   Allergen Reactions    Ferrous sulfate Other (See Comments)     Patient states the pill makes her sick. She stated she would rather have a shot       Past Medical History:   Diagnosis Date    Alcohol abuse     Alcoholic hepatitis     Anemia of chronic disease     Coagulopathy     Encounter for blood transfusion     End stage liver disease     Hypertension     Hyponatremia     Liver cirrhosis     Liver transplant candidate     Obesity (BMI 30-39.9) 1/5/2016     Past Surgical History:   Procedure Laterality Date    BREAST BIOPSY      CHOLECYSTECTOMY      COLONOSCOPY N/A 12/1/2017    Procedure: COLONOSCOPY;  Surgeon: Filemon Fuentes MD;  Location: [address]);  Service: Endoscopy;  Laterality: N/A;    COLONOSCOPY N/A  "12/5/2017    Procedure: COLONOSCOPY;  Surgeon: José Chavira MD;  Location: [facility] ([address] [place] [address]);  Service: Endoscopy;  Laterality: N/A;    HYSTERECTOMY      LIVER TRANSPLANT  12/2017     Family History     Problem Relation (Age of Onset)    Breast cancer Paternal Aunt    Depression Maternal Grandfather    Diabetes Father, Sister    Hypertension Mother, Father        Social History Main Topics    Smoking status: Never Smoker    Smokeless tobacco: Never Used    Alcohol use Yes      Comment: Currently admitted to inpatient rehab 7/2017    Drug use: No    Sexual activity: Not Currently        Review of Systems   Constitutional: Negative for appetite change, chills, fatigue and fever.   Respiratory: Negative for cough, shortness of breath, wheezing and stridor.    Cardiovascular: Positive for leg swelling. Negative for chest pain and palpitations.   Gastrointestinal: Positive for abdominal distention. Negative for abdominal pain, blood in stool, constipation, diarrhea, nausea and vomiting.   Genitourinary: Negative for dysuria, frequency, hematuria and urgency.   Allergic/Immunologic: Positive for immunocompromised state.   Neurological: Negative for dizziness, weakness, numbness and headaches.     Objective:     Vital Signs (Most Recent):  Temp: 98.4 °F (36.9 °C) (03/09/18 1341)  Pulse: 84 (03/09/18 1341)  Resp: 18 (03/09/18 1341)  BP: (!) 149/81 (03/09/18 1341)  SpO2: 100 % (03/09/18 1341)  Height: 5' 3" (160 cm)  Weight: 63.2 kg (139 lb 5.3 oz)  Body mass index is 24.68 kg/m².     Physical Exam   Constitutional: She is oriented to person, place, and time. She appears well-developed and well-nourished.   HENT:   Head: Normocephalic and atraumatic.   Eyes: EOM are normal.   Neck: Normal range of motion. Neck supple.   Cardiovascular: Normal rate and regular rhythm.  Exam reveals no gallop and no friction rub.    No murmur heard.  Pulmonary/Chest: Effort normal. No respiratory distress. She has no wheezes. She " exhibits no tenderness.   Abdominal: Soft. Bowel sounds are normal. She exhibits distension. There is no tenderness.   Musculoskeletal: Normal range of motion. She exhibits edema (1+pitting).   Neurological: She is alert and oriented to person, place, and time.   Skin: Skin is warm and dry.   Psychiatric: She has a normal mood and affect.       Laboratory  CBC:   Recent Labs  Lab 03/08/18  0725 03/09/18  0245 03/09/18  1428   WBC 4.42 5.50 3.50*   RBC 3.53* 3.46* 3.45*   HGB 10.1* 9.9* 9.9*   HCT 30.9* 30.6* 30.9*    213 207   MCV 88 88 90   MCH 28.6 28.6 28.7   MCHC 32.7 32.4 32.0     CMP:   Recent Labs  Lab 03/08/18  0725 03/09/18  0245 03/09/18  1428   * 92 87   CALCIUM 8.9 8.6* 8.7   ALBUMIN 3.0* 3.0* 2.9*   PROT 6.0 6.1 6.1    141 143   K 3.3* 2.6* 2.9*   CO2 24 22* 29    108 105   BUN 25* 22* 21*   CREATININE 0.7 0.8 0.8   ALKPHOS 439* 371* 452*   * 289* 368*   * 134* 209*       Diagnostic Results:  None    Assessment/Plan:     Edema    Pt reported gaining approx 30 lbs in 8 hours yesterday  She was given lasix at OSH   Still has some lower extremity edema as well as ascites, but reports it has significantly improved.   , EKG and Echo ordered  CXR appears clear.   Infectious lópez, US liver, and thyroid studies ordered.            Elevated liver enzymes      Pt recently admitted with mild ACR  She was treated with SM x 3 (2/27, 2/28, and 3/1) with some improvement in LFTs seen.   iver pathology also showed moderate steatohepatitis.   PETH from 2/15 negative. Repeat 3/1 neg  US liver ordered  Will likely need liver bx mon          Long-term use of immunosuppressant medication    Cont prograf and cellcept. Monitor for toxicities and adjust dose accordingly.           Prophylactic immunotherapy    See long term immunosuppression        Liver transplant 12/26/2017 for ETOH    LFTs elevated see (elevated liver enzymes)          At risk for opportunistic infections     Valcyte on hold. Repeat CMV 3/8 pending.         History of alcohol abuse    PETH from 2/15 negative. Repeat 3/1.                 Discharge Planning:  Admit to [facility] M Oppenheimer, PA-C  Kidney Transplant  [facility]

## 2018-03-09 NOTE — ASSESSMENT & PLAN NOTE
Pt recently admitted with mild ACR  She was treated with SM x 3 (2/27, 2/28, and 3/1) with some improvement in LFTs seen.   iver pathology also showed moderate steatohepatitis.   PETH from 2/15 negative. Repeat 3/1 neg  US liver ordered  Will likely need liver bx mon

## 2018-03-09 NOTE — TELEPHONE ENCOUNTER
"Liver transplant 12/2017  Pt reports that she was seen at MD office today and weighted 125 lbs. She said throughout the day she started to notice that she was retaining fluid. "I blew up all of a sudden". Pt reports that she got on the scale at home tonight and she weighted in the 150s. Advised ER please contact patient to advise    Reason for Disposition   Nursing judgment    Protocols used: ST NO PROTOCOL CALL: SICK ADULT-A-[place]      "

## 2018-03-09 NOTE — SUBJECTIVE & OBJECTIVE
Subjective:     Chief Complaint/Reason for Admission:     History of Present Illness:  Ms. Marcie Bazan is a 52 y.o. 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 Post operative course complicated by hematoma evac on 1/1/18 and neutropenia requiring multiple doses of neupogen. Pt recently admitted with rejection. Liver biopsy was performed on showing mild ACR. She was treated with SM x 3 (2/27, 2/28, and 3/1) with some improvement in LFTs seen. Of significance, liver pathology also showed moderate steatohepatitis. PETH from 2/15 negative.     Pt is being admitted as transfer from [facility] for acute volume overload. Pt reported gaining approx 30 lbs in 8 hours prior to presenting to the ED. She noticed swelling especially in her abdomen and legs with some SOB. She was given lasix at OSH prior to being transferred to [facility]. Upon arrival pt reports feeling much better, she still has some lower extremity edema as well as ascites, but reports it has significantly improved. She denies fever, nausea, vomiting, SOB, CP, abdominal pain, diarrhea. She has not taken any new medications nor eaten any new foods. , labs this morning significant for elevated LFTs and hypokalemia. CXR appears clear. Repeat labs, infectious lópez, echo, US liver, EKG and thyroid studies ordered.        PTA Medications   Medication Sig    aspirin (ECOTRIN) 81 MG EC tablet Take 1 tablet (81 mg total) by mouth once daily.    atorvastatin (LIPITOR) 40 MG tablet Take 1 tablet (40 mg total) by mouth once daily.    bisacodyl (DULCOLAX) 5 mg EC tablet Take 1-2 tablets (5-10 mg total) by mouth daily as needed for Constipation.    cyclobenzaprine (FLEXERIL) 5 MG tablet Take 1 tablet (5 mg total) by mouth 3 (three) times daily as needed for Muscle spasms.    famotidine (PEPCID) 20 MG tablet Take 1 tablet (20 mg total) by mouth every evening.    gabapentin (NEURONTIN) 300 MG capsule Take 1 capsule (300 mg  total) by mouth 2 (two) times daily.    HYDROmorphone (DILAUDID) 4 MG tablet Take 0.5-1 tablets (2-4 mg total) by mouth every 6 (six) hours as needed.    magnesium oxide (MAG-OX) 400 mg tablet Take 1 tablet (400 mg total) by mouth 2 (two) times daily.    mycophenolate (CELLCEPT) 250 mg Cap Take 4 capsules (1,000 mg total) by mouth 2 (two) times daily.    NIFEdipine (PROCARDIA-XL) 60 MG (OSM) 24 hr tablet Take 1 tablet (60 mg total) by mouth once daily.    nystatin (MYCOSTATIN) 100,000 unit/mL suspension Take 5 mLs (500,000 Units total) by mouth 3 (three) times daily after meals. STOP 3/21/18    ondansetron (ZOFRAN-ODT) 4 MG TbDL DISSOLVE 1 TABLET BY MOUTH EVERY 8 HOURS AS NEEDED FOR NAUSEA AND VOMITTING    predniSONE (DELTASONE) 10 MG tablet Take PO BID: 3/3 80 mg, 3/4 60 mg, 3/5 40 mg, 3/6 20 mg; PO QD: 3/7-3/13 20 mg, 3/14-3/20 15 mg, 3/21-3/27 10 mg, 3/28-4/3 5 mg; STOP 4/4    tacrolimus (PROGRAF) 1 MG Cap Take 5 capsules (5 mg total) by mouth every 12 (twelve) hours.    traZODone (DESYREL) 50 MG tablet Take 0.5 tablets (25 mg total) by mouth every evening.       Review of patient's allergies indicates:   Allergen Reactions    Ferrous sulfate Other (See Comments)     Patient states the pill makes her sick. She stated she would rather have a shot       Past Medical History:   Diagnosis Date    Alcohol abuse     Alcoholic hepatitis     Anemia of chronic disease     Coagulopathy     Encounter for blood transfusion     End stage liver disease     Hypertension     Hyponatremia     Liver cirrhosis     Liver transplant candidate     Obesity (BMI 30-39.9) 1/5/2016     Past Surgical History:   Procedure Laterality Date    BREAST BIOPSY      CHOLECYSTECTOMY      COLONOSCOPY N/A 12/1/2017    Procedure: COLONOSCOPY;  Surgeon: Filemon Fuentes MD;  Location: [address];  Service: Endoscopy;  Laterality: N/A;    COLONOSCOPY N/A 12/5/2017    Procedure: COLONOSCOPY;  Surgeon: José Chavira MD;  " Location: [facility] ([address] [place] [address]);  Service: Endoscopy;  Laterality: N/A;    HYSTERECTOMY      LIVER TRANSPLANT  12/2017     Family History     Problem Relation (Age of Onset)    Breast cancer Paternal Aunt    Depression Maternal Grandfather    Diabetes Father, Sister    Hypertension Mother, Father        Social History Main Topics    Smoking status: Never Smoker    Smokeless tobacco: Never Used    Alcohol use Yes      Comment: Currently admitted to inpatient rehab 7/2017    Drug use: No    Sexual activity: Not Currently        Review of Systems   Constitutional: Negative for appetite change, chills, fatigue and fever.   Respiratory: Negative for cough, shortness of breath, wheezing and stridor.    Cardiovascular: Positive for leg swelling. Negative for chest pain and palpitations.   Gastrointestinal: Positive for abdominal distention. Negative for abdominal pain, blood in stool, constipation, diarrhea, nausea and vomiting.   Genitourinary: Negative for dysuria, frequency, hematuria and urgency.   Allergic/Immunologic: Positive for immunocompromised state.   Neurological: Negative for dizziness, weakness, numbness and headaches.     Objective:     Vital Signs (Most Recent):  Temp: 98.4 °F (36.9 °C) (03/09/18 1341)  Pulse: 84 (03/09/18 1341)  Resp: 18 (03/09/18 1341)  BP: (!) 149/81 (03/09/18 1341)  SpO2: 100 % (03/09/18 1341)  Height: 5' 3" (160 cm)  Weight: 63.2 kg (139 lb 5.3 oz)  Body mass index is 24.68 kg/m².     Physical Exam   Constitutional: She is oriented to person, place, and time. She appears well-developed and well-nourished.   HENT:   Head: Normocephalic and atraumatic.   Eyes: EOM are normal.   Neck: Normal range of motion. Neck supple.   Cardiovascular: Normal rate and regular rhythm.  Exam reveals no gallop and no friction rub.    No murmur heard.  Pulmonary/Chest: Effort normal. No respiratory distress. She has no wheezes. She exhibits no tenderness.   Abdominal: Soft. Bowel sounds are normal. " She exhibits distension. There is no tenderness.   Musculoskeletal: Normal range of motion. She exhibits edema (1+pitting).   Neurological: She is alert and oriented to person, place, and time.   Skin: Skin is warm and dry.   Psychiatric: She has a normal mood and affect.       Laboratory  CBC:   Recent Labs  Lab 03/08/18 0725 03/09/18  0245 03/09/18  1428   WBC 4.42 5.50 3.50*   RBC 3.53* 3.46* 3.45*   HGB 10.1* 9.9* 9.9*   HCT 30.9* 30.6* 30.9*    213 207   MCV 88 88 90   MCH 28.6 28.6 28.7   MCHC 32.7 32.4 32.0     CMP:   Recent Labs  Lab 03/08/18 0725 03/09/18  0245 03/09/18  1428   * 92 87   CALCIUM 8.9 8.6* 8.7   ALBUMIN 3.0* 3.0* 2.9*   PROT 6.0 6.1 6.1    141 143   K 3.3* 2.6* 2.9*   CO2 24 22* 29    108 105   BUN 25* 22* 21*   CREATININE 0.7 0.8 0.8   ALKPHOS 439* 371* 452*   * 289* 368*   * 134* 209*       Diagnostic Results:  None

## 2018-03-09 NOTE — ED PROVIDER NOTES
SCRIBE #1 NOTE: I, Laura Trinidad, am scribing for, and in the presence of, Garret Suggs Jr., MD. I have scribed the entire note.      History      Chief Complaint   Patient presents with    Shortness of Breath     pt reports sob, weakness, increased swelling to legs and abd       Review of patient's allergies indicates:   Allergen Reactions    Ferrous sulfate Other (See Comments)     Patient states the pill makes her sick. She stated she would rather have a shot        HPI   HPI    3/9/2018, 2:23 AM   History obtained from the patient      History of Present Illness: Marcie Bazan is a 52 y.o. female patient who presents to the Emergency Department for increased swelling which onset today. Symptoms are constant and moderate in severity. Pt reports having a liver transplant on 12/26/17. Pt states she noticed swelling to her legs and abdomen. Pt states at her appointment this morning she weighed 119lbs and now, in the ED, she weighs 150lbs. Pt states she is on 3 anti-rejection medications. Pt reports she was just increasaed on her Prednisone. No mitigating or exacerbating factors reported. Associated sxs include mild shortness of breath secondary to abdominal distention. Patient denies any CP, SOB, fever, chills, cough, congestion, and all other sxs at this time. No other prior Tx. No further complaints or concerns at this time.         Arrival mode: Personal vehicle    PCP: ODILIA Wall       Past Medical History:  Past Medical History:   Diagnosis Date    Alcohol abuse     Alcoholic hepatitis     Anemia of chronic disease     Coagulopathy     Encounter for blood transfusion     End stage liver disease     Hypertension     Hyponatremia     Liver cirrhosis     Liver transplant candidate     Obesity (BMI 30-39.9) 1/5/2016       Past Surgical History:  Past Surgical History:   Procedure Laterality Date    BREAST BIOPSY      CHOLECYSTECTOMY      COLONOSCOPY N/A 12/1/2017    Procedure:  COLONOSCOPY;  Surgeon: Filemon Fuentes MD;  Location: [facility] ([address] [place] [address]);  Service: Endoscopy;  Laterality: N/A;    COLONOSCOPY N/A 12/5/2017    Procedure: COLONOSCOPY;  Surgeon: José Chavira MD;  Location: [facility] ([address] [place] [address]);  Service: Endoscopy;  Laterality: N/A;    HYSTERECTOMY      LIVER TRANSPLANT  12/2017         Family History:  Family History   Problem Relation Age of Onset    Hypertension Mother     Hypertension Father     Diabetes Father     Diabetes Sister     Depression Maternal Grandfather     Breast cancer Paternal Aunt        Social History:  Social History     Social History Main Topics    Smoking status: Never Smoker    Smokeless tobacco: Never Used    Alcohol use Yes      Comment: Currently admitted to inpatient rehab 7/2017    Drug use: No    Sexual activity: Not Currently       ROS   Review of Systems   Constitutional: Negative for chills and fever.   HENT: Negative for congestion and sore throat.    Respiratory: Positive for shortness of breath. Negative for cough.    Cardiovascular: Positive for leg swelling. Negative for chest pain.   Gastrointestinal: Positive for abdominal distention. Negative for diarrhea, nausea and vomiting.   Genitourinary: Negative for dysuria.   Musculoskeletal: Negative for back pain.   Skin: Negative for rash.   Neurological: Negative for weakness.   Hematological: Does not bruise/bleed easily.   All other systems reviewed and are negative.      Physical Exam      Initial Vitals [03/09/18 0140]   BP Pulse Resp Temp SpO2   (!) 156/97 87 18 98.1 °F (36.7 °C) 100 %      MAP       116.67          Physical Exam  Nursing Notes and Vital Signs Reviewed.  Constitutional: Patient is in no acute distress. Well-developed and well-nourished.  Head: Atraumatic. Normocephalic.  Eyes: PERRL. EOM intact. Conjunctivae are not pale. No scleral icterus.  ENT: Mucous membranes are moist. Oropharynx is clear and symmetric.    Neck: Supple. Full ROM. No  "lymphadenopathy.  Cardiovascular: Regular rate. Regular rhythm. No murmurs, rubs, or gallops. Distal pulses are 2+ and symmetric.  Pulmonary/Chest: No respiratory distress. Clear to auscultation bilaterally. No wheezing or rales.  Abdominal: Mild abdominal distension. General abdominal tenderness. Abdomen not tense. No rebound, guarding, or rigidity. Good bowel sounds.  Genitourinary: No CVA tenderness  Musculoskeletal: Moves all extremities. No obvious deformities. No edema. No calf tenderness. radial and dorsalis pedis pulses 2+ bilaterally. 3+ edema to knee bilaterally.  Skin: Warm and dry.  Neurological: Awake and alert. Appropriate for age. GCS 15. Normal speech. Motor strength is normal at 5/5 bilaterally. Non-focal neurological examination.  Psychiatric: Normal affect. Good eye contact. Appropriate in content.          ED Course    Procedures  ED Vital Signs:  Vitals:    03/09/18 0140 03/09/18 0233 03/09/18 0332 03/09/18 0432   BP: (!) 156/97 (!) 169/100 130/68 137/72   Pulse: 87 81 80 80   Resp: 18 (!) 31 (!) 21 (!) 21   Temp: 98.1 °F (36.7 °C)      TempSrc: Oral      SpO2: 100% 100% 100% 100%   Weight: 68 kg (150 lb)      Height: 5' 3" (1.6 m)       03/09/18 0502   BP: (!) 150/86   Pulse: 85   Resp: (!) 22   Temp:    TempSrc:    SpO2: 100%   Weight:    Height:        Abnormal Lab Results:  Labs Reviewed   CBC W/ AUTO DIFFERENTIAL - Abnormal; Notable for the following:        Result Value    RBC 3.46 (*)     Hemoglobin 9.9 (*)     Hematocrit 30.6 (*)     RDW 20.7 (*)     Mono% 17.6 (*)     All other components within normal limits   COMPREHENSIVE METABOLIC PANEL - Abnormal; Notable for the following:     Potassium 2.6 (*)     CO2 22 (*)     BUN, Bld 22 (*)     Calcium 8.6 (*)     Albumin 3.0 (*)     Total Bilirubin 1.1 (*)     Alkaline Phosphatase 371 (*)      (*)      (*)     All other components within normal limits    Narrative:     K critical result(s) called and verbal readback obtained " from Jessica Dugan RN, 03/09/2018 03:50   MAGNESIUM - Abnormal; Notable for the following:     Magnesium 1.4 (*)     All other components within normal limits   PHOSPHORUS - Abnormal; Notable for the following:     Phosphorus 2.2 (*)     All other components within normal limits   B-TYPE NATRIURETIC PEPTIDE - Abnormal; Notable for the following:      (*)     All other components within normal limits   CULTURE, BLOOD   CULTURE, BLOOD   CULTURE, URINE   LACTIC ACID, PLASMA   URINALYSIS   INFLUENZA A AND B ANTIGEN   PROTIME-INR   TROPONIN I   AMMONIA   LACTIC ACID, PLASMA   LIPASE        All Lab Results:  Results for orders placed or performed during the hospital encounter of 03/09/18   CBC auto differential   Result Value Ref Range    WBC 5.50 3.90 - 12.70 K/uL    RBC 3.46 (L) 4.00 - 5.40 M/uL    Hemoglobin 9.9 (L) 12.0 - 16.0 g/dL    Hematocrit 30.6 (L) 37.0 - 48.5 %    MCV 88 82 - 98 fL    MCH 28.6 27.0 - 31.0 pg    MCHC 32.4 32.0 - 36.0 g/dL    RDW 20.7 (H) 11.5 - 14.5 %    Platelets 213 150 - 350 K/uL    MPV 10.3 9.2 - 12.9 fL    Gran # (ANC) 2.9 1.8 - 7.7 K/uL    Lymph # 1.6 1.0 - 4.8 K/uL    Mono # 1.0 0.3 - 1.0 K/uL    Eos # 0.0 0.0 - 0.5 K/uL    Baso # 0.00 0.00 - 0.20 K/uL    Gran% 52.9 38.0 - 73.0 %    Lymph% 29.3 18.0 - 48.0 %    Mono% 17.6 (H) 4.0 - 15.0 %    Eosinophil% 0.2 0.0 - 8.0 %    Basophil% 0.0 0.0 - 1.9 %    Differential Method Automated    Comprehensive metabolic panel   Result Value Ref Range    Sodium 141 136 - 145 mmol/L    Potassium 2.6 (LL) 3.5 - 5.1 mmol/L    Chloride 108 95 - 110 mmol/L    CO2 22 (L) 23 - 29 mmol/L    Glucose 92 70 - 110 mg/dL    BUN, Bld 22 (H) 6 - 20 mg/dL    Creatinine 0.8 0.5 - 1.4 mg/dL    Calcium 8.6 (L) 8.7 - 10.5 mg/dL    Total Protein 6.1 6.0 - 8.4 g/dL    Albumin 3.0 (L) 3.5 - 5.2 g/dL    Total Bilirubin 1.1 (H) 0.1 - 1.0 mg/dL    Alkaline Phosphatase 371 (H) 55 - 135 U/L     (H) 10 - 40 U/L     (H) 10 - 44 U/L    Anion Gap 11 8 - 16  mmol/L    eGFR if African American >60 >60 mL/min/1.73 m^2    eGFR if non African American >60 >60 mL/min/1.73 m^2   Lactic acid, plasma #1   Result Value Ref Range    Lactate (Lactic Acid) 1.9 0.5 - 2.2 mmol/L   Urinalysis   Result Value Ref Range    Specimen UA Urine, Clean Catch     Color, UA Yellow Yellow, Straw, Peace    Appearance, UA Clear Clear    pH, UA 6.0 5.0 - 8.0    Specific Gravity, UA 1.015 1.005 - 1.030    Protein, UA Negative Negative    Glucose, UA Negative Negative    Ketones, UA Negative Negative    Bilirubin (UA) Negative Negative    Occult Blood UA Negative Negative    Nitrite, UA Negative Negative    Urobilinogen, UA Negative <2.0 EU/dL    Leukocytes, UA Negative Negative   Influenza antigen Nasopharyngeal Swab   Result Value Ref Range    Influenza A Ag, EIA Negative Negative    Influenza B Ag, EIA Negative Negative    Flu A & B Source Nasopharyngeal Swab    Magnesium   Result Value Ref Range    Magnesium 1.4 (L) 1.6 - 2.6 mg/dL   Phosphorus   Result Value Ref Range    Phosphorus 2.2 (L) 2.7 - 4.5 mg/dL   Protime-INR   Result Value Ref Range    Prothrombin Time 10.5 9.0 - 12.5 sec    INR 1.0 0.8 - 1.2   Brain natriuretic peptide   Result Value Ref Range     (H) 0 - 99 pg/mL   Troponin I   Result Value Ref Range    Troponin I <0.006 0.000 - 0.026 ng/mL   Ammonia   Result Value Ref Range    Ammonia 30 10 - 50 umol/L         Imaging Results:  Imaging Results          X-Ray Chest AP Portable (In process)                The EKG was ordered, reviewed, and independently interpreted by the ED provider.  Interpretation time: 2:27  Rate: 82 BPM  Rhythm: normal sinus rhythm  Interpretation: No acute ST&T changes. No STEMI.           The Emergency Provider reviewed the vital signs and test results, which are outlined above.    ED Discussion     4:21 AM: Consult with Dr. Gleason (Transplant Unit) at [facility] concerning pt. There are no transplant services, which the patient requires,  offered at [facility] at this time. Dr. Gleason expresses understanding and will accept transfer for further evaluation. Dr. Gleason has no specific recommendation for antibiotics at this time.   Accepting Facility: [facility]  Accepting Physician: Dr. Gleason    4:23 AM: Re-evaluated pt. Informed pt and family that there are no Transplant services available at this time. I have discussed test results, shared treatment plan, and the need for transfer with patient and family at bedside. All historical, clinical, radiographic, and laboratory findings were reviewed with the patient/family in detail. [facility] with BLS care required en route. Patient understands that there could be unforeseen motor vehicle accidents or loss of vital signs that could result in potential death or permanent disability. Pt and family express understanding at this time and agree with all information. All questions answered. Pt and family have no further questions or concerns at this time. Pt is ready for transfer.          ED Medication(s):  Medications   furosemide injection 40 mg (40 mg Intravenous Given 3/9/18 9192)   potassium chloride 10% solution 40 mEq (40 mEq Oral Given 3/9/18 6447)   HYDROmorphone tablet 4 mg (4 mg Oral Given 3/9/18 7677)       New Prescriptions    No medications on file             Medical Decision Making    Medical Decision Making:   Clinical Tests:   Lab Tests: Ordered and Reviewed  Radiological Study: Ordered and Reviewed  Medical Tests: Ordered and Reviewed           Scribe Attestation:   Scribe #1: I performed the above scribed service and the documentation accurately describes the services I performed. I attest to the accuracy of the note.    Attending:   Physician Attestation Statement for Scribe #1: I, Garret Suggs Jr., MD, personally performed the services described in this documentation, as scribed by Laura Trinidad, in my  presence, and it is both accurate and complete.          Clinical Impression       ICD-10-CM ICD-9-CM   1. Complication of transplanted liver, unspecified complication T86.40 996.82   2. SOB (shortness of breath) R06.02 786.05   3. Other ascites R18.8 789.59   4. Hypokalemia E87.6 276.8       Disposition:   Disposition: Transferred  Condition: Stable         Garret Suggs Jr., MD  03/09/18 0534

## 2018-03-10 LAB
ALBUMIN SERPL BCP-MCNC: 2.5 G/DL
ALP SERPL-CCNC: 389 U/L
ALT SERPL W/O P-5'-P-CCNC: 302 U/L
ANION GAP SERPL CALC-SCNC: 10 MMOL/L
AST SERPL-CCNC: 130 U/L
BACTERIA UR CULT: NORMAL
BASOPHILS # BLD AUTO: 0 K/UL
BASOPHILS NFR BLD: 0 %
BILIRUB SERPL-MCNC: 1.3 MG/DL
BUN SERPL-MCNC: 18 MG/DL
CALCIUM SERPL-MCNC: 8.2 MG/DL
CHLORIDE SERPL-SCNC: 105 MMOL/L
CO2 SERPL-SCNC: 25 MMOL/L
CREAT SERPL-MCNC: 0.7 MG/DL
DIFFERENTIAL METHOD: ABNORMAL
EOSINOPHIL # BLD AUTO: 0 K/UL
EOSINOPHIL NFR BLD: 0 %
ERYTHROCYTE [DISTWIDTH] IN BLOOD BY AUTOMATED COUNT: 20.1 %
EST. GFR  (AFRICAN AMERICAN): >60 ML/MIN/1.73 M^2
EST. GFR  (NON AFRICAN AMERICAN): >60 ML/MIN/1.73 M^2
GLUCOSE SERPL-MCNC: 192 MG/DL
HCT VFR BLD AUTO: 28.5 %
HGB BLD-MCNC: 9.3 G/DL
IMM GRANULOCYTES # BLD AUTO: 0.02 K/UL
IMM GRANULOCYTES NFR BLD AUTO: 1 %
LYMPHOCYTES # BLD AUTO: 0.4 K/UL
LYMPHOCYTES NFR BLD: 16.7 %
MAGNESIUM SERPL-MCNC: 1.3 MG/DL
MCH RBC QN AUTO: 28.4 PG
MCHC RBC AUTO-ENTMCNC: 32.6 G/DL
MCV RBC AUTO: 87 FL
MONOCYTES # BLD AUTO: 0.1 K/UL
MONOCYTES NFR BLD: 2.9 %
NEUTROPHILS # BLD AUTO: 1.7 K/UL
NEUTROPHILS NFR BLD: 79.4 %
NRBC BLD-RTO: 0 /100 WBC
PHOSPHATE SERPL-MCNC: 4.4 MG/DL
PLATELET # BLD AUTO: 190 K/UL
PMV BLD AUTO: 10.3 FL
POCT GLUCOSE: 140 MG/DL (ref 70–110)
POCT GLUCOSE: 160 MG/DL (ref 70–110)
POCT GLUCOSE: 197 MG/DL (ref 70–110)
POTASSIUM SERPL-SCNC: 3.7 MMOL/L
PROT SERPL-MCNC: 5.3 G/DL
RBC # BLD AUTO: 3.28 M/UL
SODIUM SERPL-SCNC: 140 MMOL/L
WBC # BLD AUTO: 2.09 K/UL

## 2018-03-10 PROCEDURE — 84100 ASSAY OF PHOSPHORUS: CPT

## 2018-03-10 PROCEDURE — 83735 ASSAY OF MAGNESIUM: CPT

## 2018-03-10 PROCEDURE — 63600175 PHARM REV CODE 636 W HCPCS: Performed by: PEDIATRICS

## 2018-03-10 PROCEDURE — 80053 COMPREHEN METABOLIC PANEL: CPT

## 2018-03-10 PROCEDURE — 99233 SBSQ HOSP IP/OBS HIGH 50: CPT | Mod: 24,,, | Performed by: NURSE PRACTITIONER

## 2018-03-10 PROCEDURE — 25000003 PHARM REV CODE 250: Performed by: PHYSICIAN ASSISTANT

## 2018-03-10 PROCEDURE — 36415 COLL VENOUS BLD VENIPUNCTURE: CPT

## 2018-03-10 PROCEDURE — 20600001 HC STEP DOWN PRIVATE ROOM

## 2018-03-10 PROCEDURE — 63600175 PHARM REV CODE 636 W HCPCS: Performed by: PHYSICIAN ASSISTANT

## 2018-03-10 PROCEDURE — 25000003 PHARM REV CODE 250: Performed by: PEDIATRICS

## 2018-03-10 PROCEDURE — 85025 COMPLETE CBC W/AUTO DIFF WBC: CPT

## 2018-03-10 RX ORDER — GLUCAGON 1 MG
1 KIT INJECTION
Status: DISCONTINUED | OUTPATIENT
Start: 2018-03-10 | End: 2018-03-17 | Stop reason: HOSPADM

## 2018-03-10 RX ORDER — IBUPROFEN 200 MG
16 TABLET ORAL
Status: DISCONTINUED | OUTPATIENT
Start: 2018-03-10 | End: 2018-03-17 | Stop reason: HOSPADM

## 2018-03-10 RX ORDER — INSULIN ASPART 100 [IU]/ML
0-5 INJECTION, SOLUTION INTRAVENOUS; SUBCUTANEOUS
Status: DISCONTINUED | OUTPATIENT
Start: 2018-03-10 | End: 2018-03-17 | Stop reason: HOSPADM

## 2018-03-10 RX ORDER — IBUPROFEN 200 MG
24 TABLET ORAL
Status: DISCONTINUED | OUTPATIENT
Start: 2018-03-10 | End: 2018-03-17 | Stop reason: HOSPADM

## 2018-03-10 RX ADMIN — TACROLIMUS 6 MG: 1 CAPSULE ORAL at 05:03

## 2018-03-10 RX ADMIN — GABAPENTIN 300 MG: 300 CAPSULE ORAL at 08:03

## 2018-03-10 RX ADMIN — FAMOTIDINE 20 MG: 20 TABLET, FILM COATED ORAL at 08:03

## 2018-03-10 RX ADMIN — MYCOPHENOLATE MOFETIL 1000 MG: 250 CAPSULE ORAL at 09:03

## 2018-03-10 RX ADMIN — NYSTATIN 500000 UNITS: 500000 SUSPENSION ORAL at 09:03

## 2018-03-10 RX ADMIN — ASPIRIN 81 MG: 81 TABLET, COATED ORAL at 09:03

## 2018-03-10 RX ADMIN — MAGNESIUM OXIDE TAB 400 MG (241.3 MG ELEMENTAL MG) 400 MG: 400 (241.3 MG) TAB at 09:03

## 2018-03-10 RX ADMIN — NIFEDIPINE 60 MG: 60 TABLET, FILM COATED, EXTENDED RELEASE ORAL at 09:03

## 2018-03-10 RX ADMIN — ATORVASTATIN CALCIUM 40 MG: 20 TABLET, FILM COATED ORAL at 09:03

## 2018-03-10 RX ADMIN — NYSTATIN 500000 UNITS: 500000 SUSPENSION ORAL at 02:03

## 2018-03-10 RX ADMIN — DEXTROSE: 50 INJECTION, SOLUTION INTRAVENOUS at 08:03

## 2018-03-10 RX ADMIN — MYCOPHENOLATE MOFETIL 1000 MG: 250 CAPSULE ORAL at 08:03

## 2018-03-10 RX ADMIN — MAGNESIUM OXIDE TAB 400 MG (241.3 MG ELEMENTAL MG) 400 MG: 400 (241.3 MG) TAB at 08:03

## 2018-03-10 RX ADMIN — DEXTROSE: 50 INJECTION, SOLUTION INTRAVENOUS at 10:03

## 2018-03-10 RX ADMIN — DIPHENHYDRAMINE HYDROCHLORIDE 25 MG: 50 INJECTION, SOLUTION INTRAMUSCULAR; INTRAVENOUS at 02:03

## 2018-03-10 RX ADMIN — NYSTATIN 500000 UNITS: 500000 SUSPENSION ORAL at 05:03

## 2018-03-10 RX ADMIN — DIPHENHYDRAMINE HYDROCHLORIDE 25 MG: 50 INJECTION, SOLUTION INTRAMUSCULAR; INTRAVENOUS at 08:03

## 2018-03-10 RX ADMIN — TRAZODONE HYDROCHLORIDE 25 MG: 50 TABLET ORAL at 08:03

## 2018-03-10 RX ADMIN — TACROLIMUS 6 MG: 1 CAPSULE ORAL at 09:03

## 2018-03-10 RX ADMIN — GABAPENTIN 300 MG: 300 CAPSULE ORAL at 09:03

## 2018-03-10 NOTE — PLAN OF CARE
Problem: Patient Care Overview  Goal: Plan of Care Review  Outcome: Ongoing (interventions implemented as appropriate)  52 y.o LT from 12/26/17 admitted from [facility] ED yesterday. LFTs trending down slowly. IV steroids given per order. Pt BG monitored AC/HS. Pt complained of eye swelling and itching. PRN benedryl given. Pt free from falls and injury throughout shift. Plan of care reviewed with patient.

## 2018-03-10 NOTE — PLAN OF CARE
Problem: Patient Care Overview  Goal: Plan of Care Review  Outcome: Ongoing (interventions implemented as appropriate)  Plan of care reviewed with patient. Pt verbalized understanding. Pt AAOX4. Pt free from falls, injuries and trauma.  Pt free from skin breakdown.  Pt VSS and in NAD.  Pt afebrile.  Pt had no complaints of SOB, N/V or CP.  Pt had no complaints of pain this shift. Adequate UOP this shift. No BM this shift. Pt had uneventful evening. Pt in low locked bed with personal items and call light within reach. Fall precautions maintained.

## 2018-03-10 NOTE — HOSPITAL COURSE
Interval history: no acute events over night. LFTs improving. Plan for Thymo #5 today. CD3 count sent this morning, pending. Potassium and phos low, will replace. Pt with some LE edema, will give 40mg PO lasix x1 today. Will assess daily need.  Monitor.

## 2018-03-10 NOTE — ASSESSMENT & PLAN NOTE
- continue prograf, steroids, and cellcept  -monitor prograf levels and adjust for therapeutic level

## 2018-03-10 NOTE — HPI
Ms. Marcie Bazan is a 52 y.o. 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 Post operative course complicated by hematoma evac on 1/1/18 and neutropenia requiring multiple doses of neupogen. Pt recently admitted with rejection. Liver biopsy was performed on showing mild ACR. She was treated with SM x 3 (2/27, 2/28, and 3/1) with some improvement in LFTs seen. Of significance, liver pathology also showed moderate steatohepatitis. PETH from 2/15 negative.      Pt is being admitted as transfer from [facility] for acute volume overload. Pt reported gaining approx 30 lbs in 8 hours prior to presenting to the ED. She noticed swelling especially in her abdomen and legs with some SOB. She was given lasix at OSH prior to being transferred to [facility]. Upon arrival pt reports feeling much better, she still has some lower extremity edema as well as ascites, but reports it has significantly improved. She denies fever, nausea, vomiting, SOB, CP, abdominal pain, diarrhea. She has not taken any new medications nor eaten any new foods. , labs this morning significant for elevated LFTs and hypokalemia. CXR appears clear. Repeat labs, infectious lópez, echo, US liver, EKG and thyroid studies ordered.

## 2018-03-10 NOTE — SUBJECTIVE & OBJECTIVE
Subjective:     Chief Complaint/Reason for Admission:     History of Present Illness:  Ms. Marcie Bazan is a 52 y.o. 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 Post operative course complicated by hematoma evac on 1/1/18 and neutropenia requiring multiple doses of neupogen. Pt recently admitted with rejection. Liver biopsy was performed on showing mild ACR. She was treated with SM x 3 (2/27, 2/28, and 3/1) with some improvement in LFTs seen. Of significance, liver pathology also showed moderate steatohepatitis. PETH from 2/15 negative.      Pt is being admitted as transfer from [facility] for acute volume overload. Pt reported gaining approx 30 lbs in 8 hours prior to presenting to the ED. She noticed swelling especially in her abdomen and legs with some SOB. She was given lasix at OSH prior to being transferred to [facility]. Upon arrival pt reports feeling much better, she still has some lower extremity edema as well as ascites, but reports it has significantly improved. She denies fever, nausea, vomiting, SOB, CP, abdominal pain, diarrhea. She has not taken any new medications nor eaten any new foods. , labs this morning significant for elevated LFTs and hypokalemia. CXR appears clear. Repeat labs, infectious lópez, echo, US liver, EKG and thyroid studies ordered.      PTA Medications   Medication Sig    aspirin (ECOTRIN) 81 MG EC tablet Take 1 tablet (81 mg total) by mouth once daily.    atorvastatin (LIPITOR) 40 MG tablet Take 1 tablet (40 mg total) by mouth once daily.    bisacodyl (DULCOLAX) 5 mg EC tablet Take 1-2 tablets (5-10 mg total) by mouth daily as needed for Constipation.    cyclobenzaprine (FLEXERIL) 5 MG tablet Take 1 tablet (5 mg total) by mouth 3 (three) times daily as needed for Muscle spasms.    famotidine (PEPCID) 20 MG tablet Take 1 tablet (20 mg total) by mouth every evening.    gabapentin (NEURONTIN) 300 MG capsule Take 1 capsule (300 mg  total) by mouth 2 (two) times daily.    HYDROmorphone (DILAUDID) 4 MG tablet Take 0.5-1 tablets (2-4 mg total) by mouth every 6 (six) hours as needed.    magnesium oxide (MAG-OX) 400 mg tablet Take 1 tablet (400 mg total) by mouth 2 (two) times daily.    mycophenolate (CELLCEPT) 250 mg Cap Take 4 capsules (1,000 mg total) by mouth 2 (two) times daily.    NIFEdipine (PROCARDIA-XL) 60 MG (OSM) 24 hr tablet Take 1 tablet (60 mg total) by mouth once daily.    nystatin (MYCOSTATIN) 100,000 unit/mL suspension Take 5 mLs (500,000 Units total) by mouth 3 (three) times daily after meals. STOP 3/21/18    ondansetron (ZOFRAN-ODT) 4 MG TbDL DISSOLVE 1 TABLET BY MOUTH EVERY 8 HOURS AS NEEDED FOR NAUSEA AND VOMITTING    predniSONE (DELTASONE) 10 MG tablet Take PO BID: 3/3 80 mg, 3/4 60 mg, 3/5 40 mg, 3/6 20 mg; PO QD: 3/7-3/13 20 mg, 3/14-3/20 15 mg, 3/21-3/27 10 mg, 3/28-4/3 5 mg; STOP 4/4    tacrolimus (PROGRAF) 1 MG Cap Take 5 capsules (5 mg total) by mouth every 12 (twelve) hours.    traZODone (DESYREL) 50 MG tablet Take 0.5 tablets (25 mg total) by mouth every evening.       Review of patient's allergies indicates:   Allergen Reactions    Ferrous sulfate Other (See Comments)     Patient states the pill makes her sick. She stated she would rather have a shot       Past Medical History:   Diagnosis Date    Alcohol abuse     Alcoholic hepatitis     Anemia of chronic disease     Coagulopathy     Encounter for blood transfusion     End stage liver disease     Hypertension     Hyponatremia     Liver cirrhosis     Liver transplant candidate     Obesity (BMI 30-39.9) 1/5/2016     Past Surgical History:   Procedure Laterality Date    BREAST BIOPSY      CHOLECYSTECTOMY      COLONOSCOPY N/A 12/1/2017    Procedure: COLONOSCOPY;  Surgeon: Filemon Fuentes MD;  Location: [address];  Service: Endoscopy;  Laterality: N/A;    COLONOSCOPY N/A 12/5/2017    Procedure: COLONOSCOPY;  Surgeon: José Chavira MD;  " Location: [facility] ([address] [place] [address]);  Service: Endoscopy;  Laterality: N/A;    HYSTERECTOMY      LIVER TRANSPLANT  12/2017     Family History     Problem Relation (Age of Onset)    Breast cancer Paternal Aunt    Depression Maternal Grandfather    Diabetes Father, Sister    Hypertension Mother, Father        Social History Main Topics    Smoking status: Never Smoker    Smokeless tobacco: Never Used    Alcohol use Yes      Comment: Currently admitted to inpatient rehab 7/2017    Drug use: No    Sexual activity: Not Currently        Review of Systems   Constitutional: Negative for appetite change, chills, fatigue and fever.   Respiratory: Negative for cough, shortness of breath, wheezing and stridor.    Cardiovascular: Positive for leg swelling. Negative for chest pain and palpitations.   Gastrointestinal: Positive for abdominal distention. Negative for abdominal pain, blood in stool, constipation, diarrhea, nausea and vomiting.   Genitourinary: Negative for dysuria, frequency, hematuria and urgency.   Allergic/Immunologic: Positive for immunocompromised state.   Neurological: Negative for dizziness, weakness, numbness and headaches.     Objective:     Vital Signs (Most Recent):  Temp: 98.6 °F (37 °C) (03/10/18 1138)  Pulse: 88 (03/10/18 1138)  Resp: 18 (03/10/18 1138)  BP: (!) 143/76 (03/10/18 0928)  SpO2: 100 % (03/10/18 1138)  Height: 5' 3" (160 cm)  Weight: 63.2 kg (139 lb 5.3 oz)  Body mass index is 24.68 kg/m².     Physical Exam   Constitutional: She is oriented to person, place, and time. She appears well-developed and well-nourished.   HENT:   Head: Normocephalic and atraumatic.   Eyes: EOM are normal.   Neck: Normal range of motion. Neck supple.   Cardiovascular: Normal rate and regular rhythm.  Exam reveals no gallop and no friction rub.    No murmur heard.  Pulmonary/Chest: Effort normal. No respiratory distress. She has no wheezes. She exhibits no tenderness.   Abdominal: Soft. Bowel sounds are normal. " She exhibits distension. There is no tenderness.   Musculoskeletal: Normal range of motion. She exhibits edema (1+pitting).   Neurological: She is alert and oriented to person, place, and time.   Skin: Skin is warm and dry.   Psychiatric: She has a normal mood and affect. Her behavior is normal. Judgment and thought content normal.   Nursing note and vitals reviewed.      Laboratory  CBC:     Recent Labs  Lab 03/09/18  0245 03/09/18  1428 03/10/18  0526   WBC 5.50 3.50* 2.09*   RBC 3.46* 3.45* 3.28*   HGB 9.9* 9.9* 9.3*   HCT 30.6* 30.9* 28.5*    207 190   MCV 88 90 87   MCH 28.6 28.7 28.4   MCHC 32.4 32.0 32.6     CMP:     Recent Labs  Lab 03/09/18  1428 03/09/18  2210 03/10/18  0526   GLU 87 121* 192*   CALCIUM 8.7 8.3* 8.2*   ALBUMIN 2.9* 2.7* 2.5*   PROT 6.1 5.5* 5.3*    139 140   K 2.9* 3.6 3.7   CO2 29 25 25    106 105   BUN 21* 21* 18   CREATININE 0.8 0.7 0.7   ALKPHOS 452* 389* 389*   * 308* 302*   * 163* 130*       Diagnostic Results:  None

## 2018-03-10 NOTE — PROGRESS NOTES
[facility]  Liver Transplant  Progress Note    Patient Name: Marcie Bazan  MRN: [id number]  Admission Date: 3/9/2018  Hospital Length of Stay: 1 days  Code Status: Full Code  Primary Care Provider: ODILIA Wall  Post-Operative Day: 74    ORGAN:   LIVER  Disease Etiology: Alcoholic Cirrhosis  Donor Type:    - Brain Death  CDC High Risk:   No  Donor CMV Status:   Donor CMV Status: Negative  Donor HBcAB:   Negative  Donor HCV Status:   Negative  Whole or Partial: Whole Liver  Biliary Anastomosis: End to End  Arterial Anatomy: Standard  Subjective:     History of Present Illness:  Ms. Marcie Bazan is a 52 y.o. 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 Post operative course complicated by hematoma evac on 18 and neutropenia requiring multiple doses of neupogen. Pt recently admitted with rejection. Liver biopsy was performed on showing mild ACR. She was treated with SM x 3 (, , and 3/1) with some improvement in LFTs seen. Of significance, liver pathology also showed moderate steatohepatitis. PETH from 2/15 negative.      Pt is being admitted as transfer from [facility] for acute volume overload. Pt reported gaining approx 30 lbs in 8 hours prior to presenting to the ED. She noticed swelling especially in her abdomen and legs with some SOB. She was given lasix at OSH prior to being transferred to [facility]. Upon arrival pt reports feeling much better, she still has some lower extremity edema as well as ascites, but reports it has significantly improved. She denies fever, nausea, vomiting, SOB, CP, abdominal pain, diarrhea. She has not taken any new medications nor eaten any new foods. , labs this morning significant for elevated LFTs and hypokalemia. CXR appears clear. Repeat labs, infectious lópez, echo, US liver, EKG and thyroid studies ordered.    Hospital Course:  Interval history: no acute events over night.  Swelling  improved.  Continue solumedrol pulse for suspected rejection.  Obtain liver biopsy on Monday.  LFTs slightly improved today.      Subjective:     Chief Complaint/Reason for Admission:     History of Present Illness:  Ms. Marcie Bazan is a 52 y.o. 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 Post operative course complicated by hematoma evac on 1/1/18 and neutropenia requiring multiple doses of neupogen. Pt recently admitted with rejection. Liver biopsy was performed on showing mild ACR. She was treated with SM x 3 (2/27, 2/28, and 3/1) with some improvement in LFTs seen. Of significance, liver pathology also showed moderate steatohepatitis. PETH from 2/15 negative.      Pt is being admitted as transfer from [facility] for acute volume overload. Pt reported gaining approx 30 lbs in 8 hours prior to presenting to the ED. She noticed swelling especially in her abdomen and legs with some SOB. She was given lasix at OSH prior to being transferred to [facility]. Upon arrival pt reports feeling much better, she still has some lower extremity edema as well as ascites, but reports it has significantly improved. She denies fever, nausea, vomiting, SOB, CP, abdominal pain, diarrhea. She has not taken any new medications nor eaten any new foods. , labs this morning significant for elevated LFTs and hypokalemia. CXR appears clear. Repeat labs, infectious lópez, echo, US liver, EKG and thyroid studies ordered.      PTA Medications   Medication Sig    aspirin (ECOTRIN) 81 MG EC tablet Take 1 tablet (81 mg total) by mouth once daily.    atorvastatin (LIPITOR) 40 MG tablet Take 1 tablet (40 mg total) by mouth once daily.    bisacodyl (DULCOLAX) 5 mg EC tablet Take 1-2 tablets (5-10 mg total) by mouth daily as needed for Constipation.    cyclobenzaprine (FLEXERIL) 5 MG tablet Take 1 tablet (5 mg total) by mouth 3 (three) times daily as needed for Muscle spasms.    famotidine (PEPCID) 20  MG tablet Take 1 tablet (20 mg total) by mouth every evening.    gabapentin (NEURONTIN) 300 MG capsule Take 1 capsule (300 mg total) by mouth 2 (two) times daily.    HYDROmorphone (DILAUDID) 4 MG tablet Take 0.5-1 tablets (2-4 mg total) by mouth every 6 (six) hours as needed.    magnesium oxide (MAG-OX) 400 mg tablet Take 1 tablet (400 mg total) by mouth 2 (two) times daily.    mycophenolate (CELLCEPT) 250 mg Cap Take 4 capsules (1,000 mg total) by mouth 2 (two) times daily.    NIFEdipine (PROCARDIA-XL) 60 MG (OSM) 24 hr tablet Take 1 tablet (60 mg total) by mouth once daily.    nystatin (MYCOSTATIN) 100,000 unit/mL suspension Take 5 mLs (500,000 Units total) by mouth 3 (three) times daily after meals. STOP 3/21/18    ondansetron (ZOFRAN-ODT) 4 MG TbDL DISSOLVE 1 TABLET BY MOUTH EVERY 8 HOURS AS NEEDED FOR NAUSEA AND VOMITTING    predniSONE (DELTASONE) 10 MG tablet Take PO BID: 3/3 80 mg, 3/4 60 mg, 3/5 40 mg, 3/6 20 mg; PO QD: 3/7-3/13 20 mg, 3/14-3/20 15 mg, 3/21-3/27 10 mg, 3/28-4/3 5 mg; STOP 4/4    tacrolimus (PROGRAF) 1 MG Cap Take 5 capsules (5 mg total) by mouth every 12 (twelve) hours.    traZODone (DESYREL) 50 MG tablet Take 0.5 tablets (25 mg total) by mouth every evening.       Review of patient's allergies indicates:   Allergen Reactions    Ferrous sulfate Other (See Comments)     Patient states the pill makes her sick. She stated she would rather have a shot       Past Medical History:   Diagnosis Date    Alcohol abuse     Alcoholic hepatitis     Anemia of chronic disease     Coagulopathy     Encounter for blood transfusion     End stage liver disease     Hypertension     Hyponatremia     Liver cirrhosis     Liver transplant candidate     Obesity (BMI 30-39.9) 1/5/2016     Past Surgical History:   Procedure Laterality Date    BREAST BIOPSY      CHOLECYSTECTOMY      COLONOSCOPY N/A 12/1/2017    Procedure: COLONOSCOPY;  Surgeon: Filemon Fuentes MD;  Location: [facility] ([address] [place] [address]);   "Service: Endoscopy;  Laterality: N/A;    COLONOSCOPY N/A 12/5/2017    Procedure: COLONOSCOPY;  Surgeon: José Chavira MD;  Location: [facility] ([address] [place] [address]);  Service: Endoscopy;  Laterality: N/A;    HYSTERECTOMY      LIVER TRANSPLANT  12/2017     Family History     Problem Relation (Age of Onset)    Breast cancer Paternal Aunt    Depression Maternal Grandfather    Diabetes Father, Sister    Hypertension Mother, Father        Social History Main Topics    Smoking status: Never Smoker    Smokeless tobacco: Never Used    Alcohol use Yes      Comment: Currently admitted to inpatient rehab 7/2017    Drug use: No    Sexual activity: Not Currently        Review of Systems   Constitutional: Negative for appetite change, chills, fatigue and fever.   Respiratory: Negative for cough, shortness of breath, wheezing and stridor.    Cardiovascular: Positive for leg swelling. Negative for chest pain and palpitations.   Gastrointestinal: Positive for abdominal distention. Negative for abdominal pain, blood in stool, constipation, diarrhea, nausea and vomiting.   Genitourinary: Negative for dysuria, frequency, hematuria and urgency.   Allergic/Immunologic: Positive for immunocompromised state.   Neurological: Negative for dizziness, weakness, numbness and headaches.     Objective:     Vital Signs (Most Recent):  Temp: 98.6 °F (37 °C) (03/10/18 1138)  Pulse: 88 (03/10/18 1138)  Resp: 18 (03/10/18 1138)  BP: (!) 143/76 (03/10/18 0928)  SpO2: 100 % (03/10/18 1138)  Height: 5' 3" (160 cm)  Weight: 63.2 kg (139 lb 5.3 oz)  Body mass index is 24.68 kg/m².     Physical Exam   Constitutional: She is oriented to person, place, and time. She appears well-developed and well-nourished.   HENT:   Head: Normocephalic and atraumatic.   Eyes: EOM are normal.   Neck: Normal range of motion. Neck supple.   Cardiovascular: Normal rate and regular rhythm.  Exam reveals no gallop and no friction rub.    No murmur heard.  Pulmonary/Chest: Effort " normal. No respiratory distress. She has no wheezes. She exhibits no tenderness.   Abdominal: Soft. Bowel sounds are normal. She exhibits distension. There is no tenderness.   Musculoskeletal: Normal range of motion. She exhibits edema (1+pitting).   Neurological: She is alert and oriented to person, place, and time.   Skin: Skin is warm and dry.   Psychiatric: She has a normal mood and affect. Her behavior is normal. Judgment and thought content normal.   Nursing note and vitals reviewed.      Laboratory  CBC:     Recent Labs  Lab 03/09/18  0245 03/09/18  1428 03/10/18  0526   WBC 5.50 3.50* 2.09*   RBC 3.46* 3.45* 3.28*   HGB 9.9* 9.9* 9.3*   HCT 30.6* 30.9* 28.5*    207 190   MCV 88 90 87   MCH 28.6 28.7 28.4   MCHC 32.4 32.0 32.6     CMP:     Recent Labs  Lab 03/09/18  1428 03/09/18  2210 03/10/18  0526   GLU 87 121* 192*   CALCIUM 8.7 8.3* 8.2*   ALBUMIN 2.9* 2.7* 2.5*   PROT 6.1 5.5* 5.3*    139 140   K 2.9* 3.6 3.7   CO2 29 25 25    106 105   BUN 21* 21* 18   CREATININE 0.8 0.7 0.7   ALKPHOS 452* 389* 389*   * 308* 302*   * 163* 130*       Diagnostic Results:  None    Assessment/Plan:     * Elevated liver enzymes    - suspect persistent rejection  - treat with solumedrol pulse  - plan for repeat biopsy on monday          Liver transplant 12/26/2017 for ETOH    - lfts elevated, see above        Prophylactic immunotherapy    - continue prograf, steroids, and cellcept  -monitor prograf levels and adjust for therapeutic level          Long-term use of immunosuppressant medication    - see prophylactic immunosuppression          At risk for opportunistic infections    - weekly cmv pcr,  No valcyte due to leukopenia  - pentamidine for pcp            Anemia of chronic disease    - h/h stable, monitor labs daily            VTE Risk Mitigation         Ordered     heparin (porcine) injection 5,000 Units  Every 8 hours     Route:  Subcutaneous        03/09/18 1350     Medium Risk of VTE   Once      03/09/18 1350     Place sequential compression device  Until discontinued      03/09/18 1350          The patients clinical status was discussed at multidisplinary rounds, involving transplant surgery, transplant medicine, pharmacy, nursing, nutrition, and social work    Discharge Planning: not candidate at this time  No Patient Care Coordination Note on file.      Irma Herrera NP  Liver Transplant  [facility]

## 2018-03-10 NOTE — ASSESSMENT & PLAN NOTE
- suspect persistent rejection  - treat with solumedrol pulse  - plan for repeat biopsy on monday

## 2018-03-11 LAB
ALBUMIN SERPL BCP-MCNC: 2.4 G/DL
ALP SERPL-CCNC: 338 U/L
ALT SERPL W/O P-5'-P-CCNC: 231 U/L
ANION GAP SERPL CALC-SCNC: 8 MMOL/L
AST SERPL-CCNC: 73 U/L
BACTERIA UR CULT: NORMAL
BASOPHILS # BLD AUTO: 0 K/UL
BASOPHILS NFR BLD: 0 %
BILIRUB SERPL-MCNC: 0.9 MG/DL
BUN SERPL-MCNC: 20 MG/DL
CALCIUM SERPL-MCNC: 8.3 MG/DL
CHLORIDE SERPL-SCNC: 104 MMOL/L
CO2 SERPL-SCNC: 26 MMOL/L
CREAT SERPL-MCNC: 0.8 MG/DL
DIFFERENTIAL METHOD: ABNORMAL
EOSINOPHIL # BLD AUTO: 0 K/UL
EOSINOPHIL NFR BLD: 0 %
ERYTHROCYTE [DISTWIDTH] IN BLOOD BY AUTOMATED COUNT: 20.6 %
EST. GFR  (AFRICAN AMERICAN): >60 ML/MIN/1.73 M^2
EST. GFR  (NON AFRICAN AMERICAN): >60 ML/MIN/1.73 M^2
GLUCOSE SERPL-MCNC: 204 MG/DL
HCT VFR BLD AUTO: 28 %
HGB BLD-MCNC: 9 G/DL
IMM GRANULOCYTES # BLD AUTO: 0.04 K/UL
IMM GRANULOCYTES NFR BLD AUTO: 0.5 %
LYMPHOCYTES # BLD AUTO: 0.5 K/UL
LYMPHOCYTES NFR BLD: 6 %
MAGNESIUM SERPL-MCNC: 1.5 MG/DL
MCH RBC QN AUTO: 28.6 PG
MCHC RBC AUTO-ENTMCNC: 32.1 G/DL
MCV RBC AUTO: 89 FL
MONOCYTES # BLD AUTO: 0.5 K/UL
MONOCYTES NFR BLD: 5.5 %
NEUTROPHILS # BLD AUTO: 7.7 K/UL
NEUTROPHILS NFR BLD: 88 %
NRBC BLD-RTO: 0 /100 WBC
PHOSPHATE SERPL-MCNC: 2.9 MG/DL
PLATELET # BLD AUTO: 217 K/UL
PMV BLD AUTO: 10.8 FL
POCT GLUCOSE: 175 MG/DL (ref 70–110)
POCT GLUCOSE: 94 MG/DL (ref 70–110)
POTASSIUM SERPL-SCNC: 3.5 MMOL/L
PROT SERPL-MCNC: 5.2 G/DL
RBC # BLD AUTO: 3.15 M/UL
SODIUM SERPL-SCNC: 138 MMOL/L
WBC # BLD AUTO: 8.73 K/UL

## 2018-03-11 PROCEDURE — 63600175 PHARM REV CODE 636 W HCPCS: Performed by: NURSE PRACTITIONER

## 2018-03-11 PROCEDURE — 84100 ASSAY OF PHOSPHORUS: CPT

## 2018-03-11 PROCEDURE — 25000003 PHARM REV CODE 250: Performed by: NURSE PRACTITIONER

## 2018-03-11 PROCEDURE — 63600175 PHARM REV CODE 636 W HCPCS: Performed by: PEDIATRICS

## 2018-03-11 PROCEDURE — 63600175 PHARM REV CODE 636 W HCPCS: Performed by: PHYSICIAN ASSISTANT

## 2018-03-11 PROCEDURE — 80197 ASSAY OF TACROLIMUS: CPT

## 2018-03-11 PROCEDURE — 83735 ASSAY OF MAGNESIUM: CPT

## 2018-03-11 PROCEDURE — 80053 COMPREHEN METABOLIC PANEL: CPT

## 2018-03-11 PROCEDURE — 85025 COMPLETE CBC W/AUTO DIFF WBC: CPT

## 2018-03-11 PROCEDURE — 25000003 PHARM REV CODE 250: Performed by: PHYSICIAN ASSISTANT

## 2018-03-11 PROCEDURE — 36415 COLL VENOUS BLD VENIPUNCTURE: CPT

## 2018-03-11 PROCEDURE — 99233 SBSQ HOSP IP/OBS HIGH 50: CPT | Mod: 24,,, | Performed by: NURSE PRACTITIONER

## 2018-03-11 PROCEDURE — 20600001 HC STEP DOWN PRIVATE ROOM

## 2018-03-11 RX ORDER — FUROSEMIDE 40 MG/1
40 TABLET ORAL DAILY
Status: DISCONTINUED | OUTPATIENT
Start: 2018-03-11 | End: 2018-03-13

## 2018-03-11 RX ADMIN — TACROLIMUS 6 MG: 1 CAPSULE ORAL at 05:03

## 2018-03-11 RX ADMIN — HEPARIN SODIUM 5000 UNITS: 5000 INJECTION, SOLUTION INTRAVENOUS; SUBCUTANEOUS at 02:03

## 2018-03-11 RX ADMIN — FUROSEMIDE 40 MG: 40 TABLET ORAL at 10:03

## 2018-03-11 RX ADMIN — GABAPENTIN 300 MG: 300 CAPSULE ORAL at 08:03

## 2018-03-11 RX ADMIN — NYSTATIN 500000 UNITS: 500000 SUSPENSION ORAL at 02:03

## 2018-03-11 RX ADMIN — DEXTROSE: 50 INJECTION, SOLUTION INTRAVENOUS at 05:03

## 2018-03-11 RX ADMIN — TRAZODONE HYDROCHLORIDE 25 MG: 50 TABLET ORAL at 08:03

## 2018-03-11 RX ADMIN — MAGNESIUM OXIDE TAB 400 MG (241.3 MG ELEMENTAL MG) 400 MG: 400 (241.3 MG) TAB at 08:03

## 2018-03-11 RX ADMIN — NIFEDIPINE 60 MG: 60 TABLET, FILM COATED, EXTENDED RELEASE ORAL at 09:03

## 2018-03-11 RX ADMIN — NYSTATIN 500000 UNITS: 500000 SUSPENSION ORAL at 05:03

## 2018-03-11 RX ADMIN — MYCOPHENOLATE MOFETIL 1000 MG: 250 CAPSULE ORAL at 08:03

## 2018-03-11 RX ADMIN — ATORVASTATIN CALCIUM 40 MG: 20 TABLET, FILM COATED ORAL at 08:03

## 2018-03-11 RX ADMIN — TACROLIMUS 6 MG: 1 CAPSULE ORAL at 08:03

## 2018-03-11 RX ADMIN — FAMOTIDINE 20 MG: 20 TABLET, FILM COATED ORAL at 08:03

## 2018-03-11 RX ADMIN — ASPIRIN 81 MG: 81 TABLET, COATED ORAL at 08:03

## 2018-03-11 RX ADMIN — NYSTATIN 500000 UNITS: 500000 SUSPENSION ORAL at 08:03

## 2018-03-11 RX ADMIN — DIPHENHYDRAMINE HYDROCHLORIDE 25 MG: 50 INJECTION, SOLUTION INTRAMUSCULAR; INTRAVENOUS at 04:03

## 2018-03-11 NOTE — ASSESSMENT & PLAN NOTE
- mostly improved  - some abdominal distention remains, start on standing lasix oral dose  - monitor

## 2018-03-11 NOTE — PROGRESS NOTES
[facility]  Liver Transplant  Progress Note    Patient Name: Marcie Bazan  MRN: [id number]  Admission Date: 3/9/2018  Hospital Length of Stay: 2 days  Code Status: Full Code  Primary Care Provider: ODILIA Wall  Post-Operative Day: 75    ORGAN:   LIVER  Disease Etiology: Alcoholic Cirrhosis  Donor Type:    - Brain Death  CDC High Risk:   No  Donor CMV Status:   Donor CMV Status: Negative  Donor HBcAB:   Negative  Donor HCV Status:   Negative  Whole or Partial: Whole Liver  Biliary Anastomosis: End to End  Arterial Anatomy: Standard  Subjective:     History of Present Illness:  Ms. Marcie Bazan is a 52 y.o. 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 Post operative course complicated by hematoma evac on 18 and neutropenia requiring multiple doses of neupogen. Pt recently admitted with rejection. Liver biopsy was performed on showing mild ACR. She was treated with SM x 3 (, , and 3/1) with some improvement in LFTs seen. Of significance, liver pathology also showed moderate steatohepatitis. PETH from 2/15 negative.      Pt is being admitted as transfer from [facility] for acute volume overload. Pt reported gaining approx 30 lbs in 8 hours prior to presenting to the ED. She noticed swelling especially in her abdomen and legs with some SOB. She was given lasix at OSH prior to being transferred to [facility]. Upon arrival pt reports feeling much better, she still has some lower extremity edema as well as ascites, but reports it has significantly improved. She denies fever, nausea, vomiting, SOB, CP, abdominal pain, diarrhea. She has not taken any new medications nor eaten any new foods. , labs this morning significant for elevated LFTs and hypokalemia. CXR appears clear. Repeat labs, infectious lópez, echo, US liver, EKG and thyroid studies ordered.    Hospital Course:  Interval history: no acute events over night.  Swelling  improved.  Continue solumedrol pulse for suspected rejection.  Obtain liver biopsy on Monday.  LFTs slightly improved today.      Subjective:     Chief Complaint/Reason for Admission:     History of Present Illness:  Ms. Marcie Bazan is a 52 y.o. 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 Post operative course complicated by hematoma evac on 1/1/18 and neutropenia requiring multiple doses of neupogen. Pt recently admitted with rejection. Liver biopsy was performed on showing mild ACR. She was treated with SM x 3 (2/27, 2/28, and 3/1) with some improvement in LFTs seen. Of significance, liver pathology also showed moderate steatohepatitis. PETH from 2/15 negative.      Pt is being admitted as transfer from [facility] for acute volume overload. Pt reported gaining approx 30 lbs in 8 hours prior to presenting to the ED. She noticed swelling especially in her abdomen and legs with some SOB. She was given lasix at OSH prior to being transferred to [facility]. Upon arrival pt reports feeling much better, she still has some lower extremity edema as well as ascites, but reports it has significantly improved. She denies fever, nausea, vomiting, SOB, CP, abdominal pain, diarrhea. She has not taken any new medications nor eaten any new foods. , labs this morning significant for elevated LFTs and hypokalemia. CXR appears clear. Repeat labs, infectious lópez, echo, US liver, EKG and thyroid studies ordered.      PTA Medications   Medication Sig    aspirin (ECOTRIN) 81 MG EC tablet Take 1 tablet (81 mg total) by mouth once daily.    atorvastatin (LIPITOR) 40 MG tablet Take 1 tablet (40 mg total) by mouth once daily.    bisacodyl (DULCOLAX) 5 mg EC tablet Take 1-2 tablets (5-10 mg total) by mouth daily as needed for Constipation.    cyclobenzaprine (FLEXERIL) 5 MG tablet Take 1 tablet (5 mg total) by mouth 3 (three) times daily as needed for Muscle spasms.    famotidine (PEPCID) 20  MG tablet Take 1 tablet (20 mg total) by mouth every evening.    gabapentin (NEURONTIN) 300 MG capsule Take 1 capsule (300 mg total) by mouth 2 (two) times daily.    HYDROmorphone (DILAUDID) 4 MG tablet Take 0.5-1 tablets (2-4 mg total) by mouth every 6 (six) hours as needed.    magnesium oxide (MAG-OX) 400 mg tablet Take 1 tablet (400 mg total) by mouth 2 (two) times daily.    mycophenolate (CELLCEPT) 250 mg Cap Take 4 capsules (1,000 mg total) by mouth 2 (two) times daily.    NIFEdipine (PROCARDIA-XL) 60 MG (OSM) 24 hr tablet Take 1 tablet (60 mg total) by mouth once daily.    nystatin (MYCOSTATIN) 100,000 unit/mL suspension Take 5 mLs (500,000 Units total) by mouth 3 (three) times daily after meals. STOP 3/21/18    ondansetron (ZOFRAN-ODT) 4 MG TbDL DISSOLVE 1 TABLET BY MOUTH EVERY 8 HOURS AS NEEDED FOR NAUSEA AND VOMITTING    predniSONE (DELTASONE) 10 MG tablet Take PO BID: 3/3 80 mg, 3/4 60 mg, 3/5 40 mg, 3/6 20 mg; PO QD: 3/7-3/13 20 mg, 3/14-3/20 15 mg, 3/21-3/27 10 mg, 3/28-4/3 5 mg; STOP 4/4    tacrolimus (PROGRAF) 1 MG Cap Take 5 capsules (5 mg total) by mouth every 12 (twelve) hours.    traZODone (DESYREL) 50 MG tablet Take 0.5 tablets (25 mg total) by mouth every evening.       Review of patient's allergies indicates:   Allergen Reactions    Ferrous sulfate Other (See Comments)     Patient states the pill makes her sick. She stated she would rather have a shot       Past Medical History:   Diagnosis Date    Alcohol abuse     Alcoholic hepatitis     Anemia of chronic disease     Coagulopathy     Encounter for blood transfusion     End stage liver disease     Hypertension     Hyponatremia     Liver cirrhosis     Liver transplant candidate     Obesity (BMI 30-39.9) 1/5/2016     Past Surgical History:   Procedure Laterality Date    BREAST BIOPSY      CHOLECYSTECTOMY      COLONOSCOPY N/A 12/1/2017    Procedure: COLONOSCOPY;  Surgeon: Filemon Fuentes MD;  Location: [facility] ([address] [place] [address]);   "Service: Endoscopy;  Laterality: N/A;    COLONOSCOPY N/A 12/5/2017    Procedure: COLONOSCOPY;  Surgeon: José Chavira MD;  Location: [facility] ([address] [place] [address]);  Service: Endoscopy;  Laterality: N/A;    HYSTERECTOMY      LIVER TRANSPLANT  12/2017     Family History     Problem Relation (Age of Onset)    Breast cancer Paternal Aunt    Depression Maternal Grandfather    Diabetes Father, Sister    Hypertension Mother, Father        Social History Main Topics    Smoking status: Never Smoker    Smokeless tobacco: Never Used    Alcohol use Yes      Comment: Currently admitted to inpatient rehab 7/2017    Drug use: No    Sexual activity: Not Currently        Review of Systems   Constitutional: Negative for appetite change, chills, fatigue and fever.   Respiratory: Negative for cough, shortness of breath, wheezing and stridor.    Cardiovascular: Positive for leg swelling. Negative for chest pain and palpitations.   Gastrointestinal: Positive for abdominal distention. Negative for abdominal pain, blood in stool, constipation, diarrhea, nausea and vomiting.   Genitourinary: Negative for dysuria, frequency, hematuria and urgency.   Allergic/Immunologic: Positive for immunocompromised state.   Neurological: Negative for dizziness, weakness, numbness and headaches.     Objective:     Vital Signs (Most Recent):  Temp: 97.4 °F (36.3 °C) (03/11/18 0700)  Pulse: 79 (03/11/18 0700)  Resp: 16 (03/11/18 0700)  BP: (!) 146/82 (03/11/18 0700)  SpO2: 99 % (03/11/18 0700)  Height: 5' 3" (160 cm)  Weight: 65.2 kg (143 lb 11.8 oz)  Body mass index is 25.46 kg/m².     Physical Exam   Constitutional: She is oriented to person, place, and time. She appears well-developed and well-nourished.   HENT:   Head: Normocephalic and atraumatic.   Eyes: EOM are normal.   Neck: Normal range of motion. Neck supple.   Cardiovascular: Normal rate and regular rhythm.  Exam reveals no gallop and no friction rub.    No murmur heard.  Pulmonary/Chest: " Effort normal. No respiratory distress. She has no wheezes. She exhibits no tenderness.   Abdominal: Soft. Bowel sounds are normal. She exhibits distension. There is no tenderness.   Musculoskeletal: Normal range of motion. She exhibits edema (1+pitting).   Neurological: She is alert and oriented to person, place, and time.   Skin: Skin is warm and dry.   Psychiatric: She has a normal mood and affect. Her behavior is normal. Judgment and thought content normal.   Nursing note and vitals reviewed.      Laboratory  CBC:     Recent Labs  Lab 03/09/18  1428 03/10/18  0526 03/11/18  0505   WBC 3.50* 2.09* 8.73   RBC 3.45* 3.28* 3.15*   HGB 9.9* 9.3* 9.0*   HCT 30.9* 28.5* 28.0*    190 217   MCV 90 87 89   MCH 28.7 28.4 28.6   MCHC 32.0 32.6 32.1     CMP:     Recent Labs  Lab 03/09/18  2210 03/10/18  0526 03/11/18  0505   * 192* 204*   CALCIUM 8.3* 8.2* 8.3*   ALBUMIN 2.7* 2.5* 2.4*   PROT 5.5* 5.3* 5.2*    140 138   K 3.6 3.7 3.5   CO2 25 25 26    105 104   BUN 21* 18 20   CREATININE 0.7 0.7 0.8   ALKPHOS 389* 389* 338*   * 302* 231*   * 130* 73*       Diagnostic Results:  None    Assessment/Plan:     * Elevated liver enzymes    - suspect persistent rejection  - treat with solumedrol pulse, dose # 3 3/11  - plan for repeat biopsy on monday          Liver transplant 12/26/2017 for ETOH    - lfts elevated, see above        Prophylactic immunotherapy    - continue prograf, steroids, and cellcept  -monitor prograf levels and adjust for therapeutic level          Long-term use of immunosuppressant medication    - see prophylactic immunosuppression          At risk for opportunistic infections    - weekly cmv pcr,  No valcyte due to leukopenia  - pentamidine for pcp            Anemia of chronic disease    - h/h stable, monitor labs daily        Edema    - mostly improved  - some abdominal distention remains, start on standing lasix oral dose  - monitor            VTE Risk Mitigation          Ordered     heparin (porcine) injection 5,000 Units  Every 8 hours     Route:  Subcutaneous        03/09/18 1350     Medium Risk of VTE  Once      03/09/18 1350     Place sequential compression device  Until discontinued      03/09/18 1350          The patients clinical status was discussed at multidisplinary rounds, involving transplant surgery, transplant medicine, pharmacy, nursing, nutrition, and social work    Discharge Planning: not candidate  No Patient Care Coordination Note on file.      Irma Herrera NP  Liver Transplant  [facility]

## 2018-03-11 NOTE — SUBJECTIVE & OBJECTIVE
Subjective:     Chief Complaint/Reason for Admission:     History of Present Illness:  Ms. Marcie Bazan is a 52 y.o. 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 Post operative course complicated by hematoma evac on 1/1/18 and neutropenia requiring multiple doses of neupogen. Pt recently admitted with rejection. Liver biopsy was performed on showing mild ACR. She was treated with SM x 3 (2/27, 2/28, and 3/1) with some improvement in LFTs seen. Of significance, liver pathology also showed moderate steatohepatitis. PETH from 2/15 negative.      Pt is being admitted as transfer from [facility] for acute volume overload. Pt reported gaining approx 30 lbs in 8 hours prior to presenting to the ED. She noticed swelling especially in her abdomen and legs with some SOB. She was given lasix at OSH prior to being transferred to [facility]. Upon arrival pt reports feeling much better, she still has some lower extremity edema as well as ascites, but reports it has significantly improved. She denies fever, nausea, vomiting, SOB, CP, abdominal pain, diarrhea. She has not taken any new medications nor eaten any new foods. , labs this morning significant for elevated LFTs and hypokalemia. CXR appears clear. Repeat labs, infectious lópez, echo, US liver, EKG and thyroid studies ordered.      PTA Medications   Medication Sig    aspirin (ECOTRIN) 81 MG EC tablet Take 1 tablet (81 mg total) by mouth once daily.    atorvastatin (LIPITOR) 40 MG tablet Take 1 tablet (40 mg total) by mouth once daily.    bisacodyl (DULCOLAX) 5 mg EC tablet Take 1-2 tablets (5-10 mg total) by mouth daily as needed for Constipation.    cyclobenzaprine (FLEXERIL) 5 MG tablet Take 1 tablet (5 mg total) by mouth 3 (three) times daily as needed for Muscle spasms.    famotidine (PEPCID) 20 MG tablet Take 1 tablet (20 mg total) by mouth every evening.    gabapentin (NEURONTIN) 300 MG capsule Take 1 capsule (300 mg  total) by mouth 2 (two) times daily.    HYDROmorphone (DILAUDID) 4 MG tablet Take 0.5-1 tablets (2-4 mg total) by mouth every 6 (six) hours as needed.    magnesium oxide (MAG-OX) 400 mg tablet Take 1 tablet (400 mg total) by mouth 2 (two) times daily.    mycophenolate (CELLCEPT) 250 mg Cap Take 4 capsules (1,000 mg total) by mouth 2 (two) times daily.    NIFEdipine (PROCARDIA-XL) 60 MG (OSM) 24 hr tablet Take 1 tablet (60 mg total) by mouth once daily.    nystatin (MYCOSTATIN) 100,000 unit/mL suspension Take 5 mLs (500,000 Units total) by mouth 3 (three) times daily after meals. STOP 3/21/18    ondansetron (ZOFRAN-ODT) 4 MG TbDL DISSOLVE 1 TABLET BY MOUTH EVERY 8 HOURS AS NEEDED FOR NAUSEA AND VOMITTING    predniSONE (DELTASONE) 10 MG tablet Take PO BID: 3/3 80 mg, 3/4 60 mg, 3/5 40 mg, 3/6 20 mg; PO QD: 3/7-3/13 20 mg, 3/14-3/20 15 mg, 3/21-3/27 10 mg, 3/28-4/3 5 mg; STOP 4/4    tacrolimus (PROGRAF) 1 MG Cap Take 5 capsules (5 mg total) by mouth every 12 (twelve) hours.    traZODone (DESYREL) 50 MG tablet Take 0.5 tablets (25 mg total) by mouth every evening.       Review of patient's allergies indicates:   Allergen Reactions    Ferrous sulfate Other (See Comments)     Patient states the pill makes her sick. She stated she would rather have a shot       Past Medical History:   Diagnosis Date    Alcohol abuse     Alcoholic hepatitis     Anemia of chronic disease     Coagulopathy     Encounter for blood transfusion     End stage liver disease     Hypertension     Hyponatremia     Liver cirrhosis     Liver transplant candidate     Obesity (BMI 30-39.9) 1/5/2016     Past Surgical History:   Procedure Laterality Date    BREAST BIOPSY      CHOLECYSTECTOMY      COLONOSCOPY N/A 12/1/2017    Procedure: COLONOSCOPY;  Surgeon: Filemon Fuentes MD;  Location: [address];  Service: Endoscopy;  Laterality: N/A;    COLONOSCOPY N/A 12/5/2017    Procedure: COLONOSCOPY;  Surgeon: José Chavira MD;  " Location: [facility] ([address] [place] [address]);  Service: Endoscopy;  Laterality: N/A;    HYSTERECTOMY      LIVER TRANSPLANT  12/2017     Family History     Problem Relation (Age of Onset)    Breast cancer Paternal Aunt    Depression Maternal Grandfather    Diabetes Father, Sister    Hypertension Mother, Father        Social History Main Topics    Smoking status: Never Smoker    Smokeless tobacco: Never Used    Alcohol use Yes      Comment: Currently admitted to inpatient rehab 7/2017    Drug use: No    Sexual activity: Not Currently        Review of Systems   Constitutional: Negative for appetite change, chills, fatigue and fever.   Respiratory: Negative for cough, shortness of breath, wheezing and stridor.    Cardiovascular: Positive for leg swelling. Negative for chest pain and palpitations.   Gastrointestinal: Positive for abdominal distention. Negative for abdominal pain, blood in stool, constipation, diarrhea, nausea and vomiting.   Genitourinary: Negative for dysuria, frequency, hematuria and urgency.   Allergic/Immunologic: Positive for immunocompromised state.   Neurological: Negative for dizziness, weakness, numbness and headaches.     Objective:     Vital Signs (Most Recent):  Temp: 97.4 °F (36.3 °C) (03/11/18 0700)  Pulse: 79 (03/11/18 0700)  Resp: 16 (03/11/18 0700)  BP: (!) 146/82 (03/11/18 0700)  SpO2: 99 % (03/11/18 0700)  Height: 5' 3" (160 cm)  Weight: 65.2 kg (143 lb 11.8 oz)  Body mass index is 25.46 kg/m².     Physical Exam   Constitutional: She is oriented to person, place, and time. She appears well-developed and well-nourished.   HENT:   Head: Normocephalic and atraumatic.   Eyes: EOM are normal.   Neck: Normal range of motion. Neck supple.   Cardiovascular: Normal rate and regular rhythm.  Exam reveals no gallop and no friction rub.    No murmur heard.  Pulmonary/Chest: Effort normal. No respiratory distress. She has no wheezes. She exhibits no tenderness.   Abdominal: Soft. Bowel sounds are normal. " She exhibits distension. There is no tenderness.   Musculoskeletal: Normal range of motion. She exhibits edema (1+pitting).   Neurological: She is alert and oriented to person, place, and time.   Skin: Skin is warm and dry.   Psychiatric: She has a normal mood and affect. Her behavior is normal. Judgment and thought content normal.   Nursing note and vitals reviewed.      Laboratory  CBC:     Recent Labs  Lab 03/09/18  1428 03/10/18  0526 03/11/18  0505   WBC 3.50* 2.09* 8.73   RBC 3.45* 3.28* 3.15*   HGB 9.9* 9.3* 9.0*   HCT 30.9* 28.5* 28.0*    190 217   MCV 90 87 89   MCH 28.7 28.4 28.6   MCHC 32.0 32.6 32.1     CMP:     Recent Labs  Lab 03/09/18  2210 03/10/18  0526 03/11/18  0505   * 192* 204*   CALCIUM 8.3* 8.2* 8.3*   ALBUMIN 2.7* 2.5* 2.4*   PROT 5.5* 5.3* 5.2*    140 138   K 3.6 3.7 3.5   CO2 25 25 26    105 104   BUN 21* 18 20   CREATININE 0.7 0.7 0.8   ALKPHOS 389* 389* 338*   * 302* 231*   * 130* 73*       Diagnostic Results:  None

## 2018-03-11 NOTE — ASSESSMENT & PLAN NOTE
- suspect persistent rejection  - treat with solumedrol pulse, dose # 3 3/11  - plan for repeat biopsy on monday

## 2018-03-12 PROBLEM — F10.11 HISTORY OF ALCOHOL ABUSE: Status: RESOLVED | Noted: 2017-11-27 | Resolved: 2018-03-12

## 2018-03-12 PROBLEM — T86.41: Status: ACTIVE | Noted: 2018-03-12

## 2018-03-12 LAB
ALBUMIN SERPL BCP-MCNC: 2.3 G/DL
ALP SERPL-CCNC: 294 U/L
ALT SERPL W/O P-5'-P-CCNC: 167 U/L
ANION GAP SERPL CALC-SCNC: 7 MMOL/L
AST SERPL-CCNC: 47 U/L
BASOPHILS # BLD AUTO: 0 K/UL
BASOPHILS NFR BLD: 0 %
BILIRUB SERPL-MCNC: 0.7 MG/DL
BUN SERPL-MCNC: 24 MG/DL
CALCIUM SERPL-MCNC: 8 MG/DL
CHLORIDE SERPL-SCNC: 105 MMOL/L
CO2 SERPL-SCNC: 26 MMOL/L
CREAT SERPL-MCNC: 0.9 MG/DL
DIFFERENTIAL METHOD: ABNORMAL
EOSINOPHIL # BLD AUTO: 0 K/UL
EOSINOPHIL NFR BLD: 0 %
ERYTHROCYTE [DISTWIDTH] IN BLOOD BY AUTOMATED COUNT: 21 %
EST. GFR  (AFRICAN AMERICAN): >60 ML/MIN/1.73 M^2
EST. GFR  (NON AFRICAN AMERICAN): >60 ML/MIN/1.73 M^2
GLUCOSE SERPL-MCNC: 158 MG/DL
HCT VFR BLD AUTO: 27 %
HGB BLD-MCNC: 8.7 G/DL
IMM GRANULOCYTES # BLD AUTO: 0.04 K/UL
IMM GRANULOCYTES NFR BLD AUTO: 0.6 %
LYMPHOCYTES # BLD AUTO: 0.5 K/UL
LYMPHOCYTES NFR BLD: 7.9 %
MAGNESIUM SERPL-MCNC: 1.4 MG/DL
MCH RBC QN AUTO: 28.4 PG
MCHC RBC AUTO-ENTMCNC: 32.2 G/DL
MCV RBC AUTO: 88 FL
MONOCYTES # BLD AUTO: 0.6 K/UL
MONOCYTES NFR BLD: 9.6 %
NEUTROPHILS # BLD AUTO: 5.4 K/UL
NEUTROPHILS NFR BLD: 81.9 %
NRBC BLD-RTO: 0 /100 WBC
PHOSPHATE SERPL-MCNC: 3.1 MG/DL
PLATELET # BLD AUTO: 238 K/UL
PMV BLD AUTO: 11.5 FL
POCT GLUCOSE: 119 MG/DL (ref 70–110)
POCT GLUCOSE: 236 MG/DL (ref 70–110)
POCT GLUCOSE: 85 MG/DL (ref 70–110)
POTASSIUM SERPL-SCNC: 3.2 MMOL/L
PROT SERPL-MCNC: 4.9 G/DL
RBC # BLD AUTO: 3.06 M/UL
SODIUM SERPL-SCNC: 138 MMOL/L
TACROLIMUS BLD-MCNC: 9.4 NG/ML
TACROLIMUS BLD-MCNC: 9.9 NG/ML
WBC # BLD AUTO: 6.55 K/UL

## 2018-03-12 PROCEDURE — B5191ZZ FLUOROSCOPY OF INFERIOR VENA CAVA USING LOW OSMOLAR CONTRAST: ICD-10-PCS | Performed by: RADIOLOGY

## 2018-03-12 PROCEDURE — 36415 COLL VENOUS BLD VENIPUNCTURE: CPT

## 2018-03-12 PROCEDURE — 63600175 PHARM REV CODE 636 W HCPCS: Performed by: NURSE PRACTITIONER

## 2018-03-12 PROCEDURE — 80053 COMPREHEN METABOLIC PANEL: CPT

## 2018-03-12 PROCEDURE — 25000003 PHARM REV CODE 250: Performed by: NURSE PRACTITIONER

## 2018-03-12 PROCEDURE — 83735 ASSAY OF MAGNESIUM: CPT

## 2018-03-12 PROCEDURE — 63600175 PHARM REV CODE 636 W HCPCS: Performed by: PHYSICIAN ASSISTANT

## 2018-03-12 PROCEDURE — 20600001 HC STEP DOWN PRIVATE ROOM

## 2018-03-12 PROCEDURE — 88307 TISSUE EXAM BY PATHOLOGIST: CPT | Performed by: PATHOLOGY

## 2018-03-12 PROCEDURE — 88313 SPECIAL STAINS GROUP 2: CPT | Mod: 26,,, | Performed by: PATHOLOGY

## 2018-03-12 PROCEDURE — 85025 COMPLETE CBC W/AUTO DIFF WBC: CPT

## 2018-03-12 PROCEDURE — 88307 TISSUE EXAM BY PATHOLOGIST: CPT | Mod: 26,,, | Performed by: PATHOLOGY

## 2018-03-12 PROCEDURE — 25000003 PHARM REV CODE 250: Performed by: PHYSICIAN ASSISTANT

## 2018-03-12 PROCEDURE — 0FB13ZX EXCISION OF RIGHT LOBE LIVER, PERCUTANEOUS APPROACH, DIAGNOSTIC: ICD-10-PCS | Performed by: RADIOLOGY

## 2018-03-12 PROCEDURE — 88342 IMHCHEM/IMCYTCHM 1ST ANTB: CPT | Mod: 26,,, | Performed by: PATHOLOGY

## 2018-03-12 PROCEDURE — 63600175 PHARM REV CODE 636 W HCPCS: Performed by: RADIOLOGY

## 2018-03-12 PROCEDURE — 84100 ASSAY OF PHOSPHORUS: CPT

## 2018-03-12 PROCEDURE — 99233 SBSQ HOSP IP/OBS HIGH 50: CPT | Mod: 24,,, | Performed by: NURSE PRACTITIONER

## 2018-03-12 PROCEDURE — 80197 ASSAY OF TACROLIMUS: CPT

## 2018-03-12 PROCEDURE — 63600175 PHARM REV CODE 636 W HCPCS: Performed by: PEDIATRICS

## 2018-03-12 PROCEDURE — 88342 IMHCHEM/IMCYTCHM 1ST ANTB: CPT | Performed by: PATHOLOGY

## 2018-03-12 PROCEDURE — B51T1ZZ FLUOROSCOPY OF PORTAL AND SPLANCHNIC VEINS USING LOW OSMOLAR CONTRAST: ICD-10-PCS | Performed by: RADIOLOGY

## 2018-03-12 RX ORDER — FENTANYL CITRATE 50 UG/ML
INJECTION, SOLUTION INTRAMUSCULAR; INTRAVENOUS CODE/TRAUMA/SEDATION MEDICATION
Status: COMPLETED | OUTPATIENT
Start: 2018-03-12 | End: 2018-03-12

## 2018-03-12 RX ORDER — MIDAZOLAM HYDROCHLORIDE 1 MG/ML
INJECTION INTRAMUSCULAR; INTRAVENOUS CODE/TRAUMA/SEDATION MEDICATION
Status: COMPLETED | OUTPATIENT
Start: 2018-03-12 | End: 2018-03-12

## 2018-03-12 RX ORDER — POTASSIUM CHLORIDE 750 MG/1
40 CAPSULE, EXTENDED RELEASE ORAL EVERY 4 HOURS
Status: DISPENSED | OUTPATIENT
Start: 2018-03-12 | End: 2018-03-12

## 2018-03-12 RX ADMIN — TRAZODONE HYDROCHLORIDE 25 MG: 50 TABLET ORAL at 08:03

## 2018-03-12 RX ADMIN — MAGNESIUM OXIDE TAB 400 MG (241.3 MG ELEMENTAL MG) 400 MG: 400 (241.3 MG) TAB at 05:03

## 2018-03-12 RX ADMIN — MIDAZOLAM HYDROCHLORIDE 0.5 MG: 1 INJECTION, SOLUTION INTRAMUSCULAR; INTRAVENOUS at 03:03

## 2018-03-12 RX ADMIN — TACROLIMUS 6 MG: 1 CAPSULE ORAL at 08:03

## 2018-03-12 RX ADMIN — MYCOPHENOLATE MOFETIL 1000 MG: 250 CAPSULE ORAL at 08:03

## 2018-03-12 RX ADMIN — MIDAZOLAM HYDROCHLORIDE 1 MG: 1 INJECTION, SOLUTION INTRAMUSCULAR; INTRAVENOUS at 03:03

## 2018-03-12 RX ADMIN — FENTANYL CITRATE 50 MCG: 50 INJECTION, SOLUTION INTRAMUSCULAR; INTRAVENOUS at 03:03

## 2018-03-12 RX ADMIN — POTASSIUM CHLORIDE 40 MEQ: 750 CAPSULE, EXTENDED RELEASE ORAL at 06:03

## 2018-03-12 RX ADMIN — GABAPENTIN 300 MG: 300 CAPSULE ORAL at 08:03

## 2018-03-12 RX ADMIN — INSULIN ASPART 1 UNITS: 100 INJECTION, SOLUTION INTRAVENOUS; SUBCUTANEOUS at 12:03

## 2018-03-12 RX ADMIN — FENTANYL CITRATE 25 MCG: 50 INJECTION, SOLUTION INTRAMUSCULAR; INTRAVENOUS at 03:03

## 2018-03-12 RX ADMIN — TACROLIMUS 6 MG: 1 CAPSULE ORAL at 06:03

## 2018-03-12 RX ADMIN — MAGNESIUM OXIDE TAB 400 MG (241.3 MG ELEMENTAL MG) 400 MG: 400 (241.3 MG) TAB at 08:03

## 2018-03-12 RX ADMIN — FUROSEMIDE 40 MG: 40 TABLET ORAL at 06:03

## 2018-03-12 RX ADMIN — GABAPENTIN 300 MG: 300 CAPSULE ORAL at 06:03

## 2018-03-12 RX ADMIN — NYSTATIN 500000 UNITS: 500000 SUSPENSION ORAL at 06:03

## 2018-03-12 RX ADMIN — NYSTATIN 500000 UNITS: 500000 SUSPENSION ORAL at 08:03

## 2018-03-12 RX ADMIN — FAMOTIDINE 20 MG: 20 TABLET, FILM COATED ORAL at 08:03

## 2018-03-12 RX ADMIN — ATORVASTATIN CALCIUM 40 MG: 20 TABLET, FILM COATED ORAL at 06:03

## 2018-03-12 RX ADMIN — NIFEDIPINE 60 MG: 60 TABLET, FILM COATED, EXTENDED RELEASE ORAL at 05:03

## 2018-03-12 RX ADMIN — ASPIRIN 81 MG: 81 TABLET, COATED ORAL at 06:03

## 2018-03-12 RX ADMIN — DIPHENHYDRAMINE HYDROCHLORIDE 25 MG: 50 INJECTION, SOLUTION INTRAMUSCULAR; INTRAVENOUS at 07:03

## 2018-03-12 NOTE — PROGRESS NOTES
Transjugular liver biopsy procedure complete. Patient tolerated well, no acute signs of distress noted. VSS. Dressing clean, dry and intact. Patient ready for transfer to [facility].

## 2018-03-12 NOTE — ASSESSMENT & PLAN NOTE
- improved today.  Cont w abdominal distention only.  Assess need for Lasix on daily basis. Monitor

## 2018-03-12 NOTE — SUBJECTIVE & OBJECTIVE
Subjective:     Chief Complaint/Reason for Admission:     History of Present Illness:  Ms. Marcie Bazan is a 52 y.o. 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 Post operative course complicated by hematoma evac on 1/1/18 and neutropenia requiring multiple doses of neupogen. Pt recently admitted with rejection. Liver biopsy was performed on showing mild ACR. She was treated with SM x 3 (2/27, 2/28, and 3/1) with some improvement in LFTs seen. Of significance, liver pathology also showed moderate steatohepatitis. PETH from 2/15 negative.      Pt is being admitted as transfer from [facility] for acute volume overload. Pt reported gaining approx 30 lbs in 8 hours prior to presenting to the ED. She noticed swelling especially in her abdomen and legs with some SOB. She was given lasix at OSH prior to being transferred to [facility]. Upon arrival pt reports feeling much better, she still has some lower extremity edema as well as ascites, but reports it has significantly improved. She denies fever, nausea, vomiting, SOB, CP, abdominal pain, diarrhea. She has not taken any new medications nor eaten any new foods. , labs this morning significant for elevated LFTs and hypokalemia. CXR appears clear. Repeat labs, infectious lópez, echo, US liver, EKG and thyroid studies ordered.      PTA Medications   Medication Sig    aspirin (ECOTRIN) 81 MG EC tablet Take 1 tablet (81 mg total) by mouth once daily.    atorvastatin (LIPITOR) 40 MG tablet Take 1 tablet (40 mg total) by mouth once daily.    bisacodyl (DULCOLAX) 5 mg EC tablet Take 1-2 tablets (5-10 mg total) by mouth daily as needed for Constipation.    cyclobenzaprine (FLEXERIL) 5 MG tablet Take 1 tablet (5 mg total) by mouth 3 (three) times daily as needed for Muscle spasms.    famotidine (PEPCID) 20 MG tablet Take 1 tablet (20 mg total) by mouth every evening.    gabapentin (NEURONTIN) 300 MG capsule Take 1 capsule (300 mg  total) by mouth 2 (two) times daily.    HYDROmorphone (DILAUDID) 4 MG tablet Take 0.5-1 tablets (2-4 mg total) by mouth every 6 (six) hours as needed.    magnesium oxide (MAG-OX) 400 mg tablet Take 1 tablet (400 mg total) by mouth 2 (two) times daily.    mycophenolate (CELLCEPT) 250 mg Cap Take 4 capsules (1,000 mg total) by mouth 2 (two) times daily.    NIFEdipine (PROCARDIA-XL) 60 MG (OSM) 24 hr tablet Take 1 tablet (60 mg total) by mouth once daily.    nystatin (MYCOSTATIN) 100,000 unit/mL suspension Take 5 mLs (500,000 Units total) by mouth 3 (three) times daily after meals. STOP 3/21/18    ondansetron (ZOFRAN-ODT) 4 MG TbDL DISSOLVE 1 TABLET BY MOUTH EVERY 8 HOURS AS NEEDED FOR NAUSEA AND VOMITTING    predniSONE (DELTASONE) 10 MG tablet Take PO BID: 3/3 80 mg, 3/4 60 mg, 3/5 40 mg, 3/6 20 mg; PO QD: 3/7-3/13 20 mg, 3/14-3/20 15 mg, 3/21-3/27 10 mg, 3/28-4/3 5 mg; STOP 4/4    tacrolimus (PROGRAF) 1 MG Cap Take 5 capsules (5 mg total) by mouth every 12 (twelve) hours.    traZODone (DESYREL) 50 MG tablet Take 0.5 tablets (25 mg total) by mouth every evening.       Review of patient's allergies indicates:   Allergen Reactions    Ferrous sulfate Other (See Comments)     Patient states the pill makes her sick. She stated she would rather have a shot       Past Medical History:   Diagnosis Date    Alcohol abuse     Alcoholic hepatitis     Anemia of chronic disease     Coagulopathy     Encounter for blood transfusion     End stage liver disease     Hypertension     Hyponatremia     Liver cirrhosis     Liver transplant candidate     Obesity (BMI 30-39.9) 1/5/2016     Past Surgical History:   Procedure Laterality Date    BREAST BIOPSY      CHOLECYSTECTOMY      COLONOSCOPY N/A 12/1/2017    Procedure: COLONOSCOPY;  Surgeon: Filemon Fuentes MD;  Location: [address];  Service: Endoscopy;  Laterality: N/A;    COLONOSCOPY N/A 12/5/2017    Procedure: COLONOSCOPY;  Surgeon: José Chavira MD;  " Location: [facility] ([address] [place] [address]);  Service: Endoscopy;  Laterality: N/A;    HYSTERECTOMY      LIVER TRANSPLANT  12/2017     Family History     Problem Relation (Age of Onset)    Breast cancer Paternal Aunt    Depression Maternal Grandfather    Diabetes Father, Sister    Hypertension Mother, Father        Social History Main Topics    Smoking status: Never Smoker    Smokeless tobacco: Never Used    Alcohol use Yes      Comment: Currently admitted to inpatient rehab 7/2017    Drug use: No    Sexual activity: Not Currently        Review of Systems   Constitutional: Negative for appetite change, chills, fatigue and fever.   Respiratory: Negative for cough, shortness of breath, wheezing and stridor.    Cardiovascular: Positive for leg swelling. Negative for chest pain and palpitations.   Gastrointestinal: Positive for abdominal distention. Negative for abdominal pain, blood in stool, constipation, diarrhea, nausea and vomiting.   Genitourinary: Negative for dysuria, frequency, hematuria and urgency.   Skin: Negative.    Allergic/Immunologic: Positive for immunocompromised state.   Neurological: Negative for dizziness, weakness, numbness and headaches.     Objective:     Vital Signs (Most Recent):  Temp: 97.8 °F (36.6 °C) (03/12/18 1207)  Pulse: 69 (03/12/18 1441)  Resp: 15 (03/12/18 1441)  BP: (!) 160/82 (03/12/18 1441)  SpO2: 100 % (03/12/18 1441)  Height: 5' 3" (160 cm)  Weight: 64.7 kg (142 lb 10.2 oz)  Body mass index is 25.27 kg/m².     Physical Exam   Constitutional: She is oriented to person, place, and time. She appears well-developed. No distress.   HENT:   Head: Normocephalic and atraumatic.   Mouth/Throat: No oropharyngeal exudate.   Eyes: EOM are normal. Pupils are equal, round, and reactive to light. No scleral icterus.   Neck: Normal range of motion. Neck supple. No thyromegaly present.   Cardiovascular: Normal rate, regular rhythm, normal heart sounds and intact distal pulses.  Exam reveals no gallop " and no friction rub.    No murmur heard.  Pulmonary/Chest: Effort normal and breath sounds normal. No respiratory distress. She has no wheezes. She exhibits no tenderness.   Abdominal: Soft. Bowel sounds are normal. She exhibits distension. There is no tenderness.   Musculoskeletal: Normal range of motion. She exhibits edema (trace BLE).   Neurological: She is alert and oriented to person, place, and time.   Skin: Skin is warm and dry. Capillary refill takes 2 to 3 seconds. She is not diaphoretic.   Psychiatric: She has a normal mood and affect. Her behavior is normal. Judgment and thought content normal.   Nursing note and vitals reviewed.      Laboratory  CBC:     Recent Labs  Lab 03/10/18  0526 03/11/18  0505 03/12/18  0504   WBC 2.09* 8.73 6.55   RBC 3.28* 3.15* 3.06*   HGB 9.3* 9.0* 8.7*   HCT 28.5* 28.0* 27.0*    217 238   MCV 87 89 88   MCH 28.4 28.6 28.4   MCHC 32.6 32.1 32.2     CMP:     Recent Labs  Lab 03/10/18  0526 03/11/18  0505 03/12/18  0504   * 204* 158*   CALCIUM 8.2* 8.3* 8.0*   ALBUMIN 2.5* 2.4* 2.3*   PROT 5.3* 5.2* 4.9*    138 138   K 3.7 3.5 3.2*   CO2 25 26 26    104 105   BUN 18 20 24*   CREATININE 0.7 0.8 0.9   ALKPHOS 389* 338* 294*   * 231* 167*   * 73* 47*     Tacrolimus Lvl   Date Value Ref Range Status   03/12/2018 9.9 5.0 - 15.0 ng/mL Final     Comment:     Testing performed by Liquid Chromatography-Tandem  Mass Spectrometry (LC-MS/MS).  This test was developed and its performance characteristics  determined by [facility], Department of Pathology  and Laboratory Medicine in a manner consistent with CLIA  requirements. It has not been cleared or approved by the [place]  Food and Drug Administration.  This test is used for clinical   purposes.  It should not be regarded as investigational or for  research.     03/11/2018 9.4 5.0 - 15.0 ng/mL Final     Comment:     Testing performed by Liquid Chromatography-Tandem  Mass Spectrometry  (LC-MS/MS).  This test was developed and its performance characteristics  determined by [facility], Department of Pathology  and Laboratory Medicine in a manner consistent with CLIA  requirements. It has not been cleared or approved by the [place]  Food and Drug Administration.  This test is used for clinical   purposes.  It should not be regarded as investigational or for  research.     03/08/2018 6.8 5.0 - 15.0 ng/mL Final     Comment:     Testing performed by Liquid Chromatography-Tandem  Mass Spectrometry (LC-MS/MS).  This test was developed and its performance characteristics  determined by [facility], Department of Pathology  and Laboratory Medicine in a manner consistent with CLIA  requirements. It has not been cleared or approved by the [place]  Food and Drug Administration.  This test is used for clinical   purposes.  It should not be regarded as investigational or for  research.     03/05/2018 3.8 (L) 5.0 - 15.0 ng/mL Final     Comment:     Testing performed by Liquid Chromatography-Tandem  Mass Spectrometry (LC-MS/MS).  This test was developed and its performance characteristics  determined by [facility], Department of Pathology  and Laboratory Medicine in a manner consistent with CLIA  requirements. It has not been cleared or approved by the [place]  Food and Drug Administration.  This test is used for clinical   purposes.  It should not be regarded as investigational or for  research.         Diagnostic Results:  None

## 2018-03-12 NOTE — ASSESSMENT & PLAN NOTE
- admitted w elevated LFT's, pt recently treated w SM for mod ACR 2/27, 2/28, 3/1.  Pt readmitted w elevated LFT's, suspect persistent rejection.  - treat with solumedrol pulse, dose # 4 on 3/11  - Liver biopsy today.

## 2018-03-12 NOTE — PROGRESS NOTES
[facility]  Liver Transplant  Progress Note    Patient Name: Marcie Bazan  MRN: [id number]  Admission Date: 3/9/2018  Hospital Length of Stay: 3 days  Code Status: Full Code  Primary Care Provider: ODILIA Wall  Post-Operative Day: 76    ORGAN:   LIVER  Disease Etiology: Alcoholic Cirrhosis  Donor Type:    - Brain Death  CDC High Risk:   No  Donor CMV Status:   Donor CMV Status: Negative  Donor HBcAB:   Negative  Donor HCV Status:   Negative  Whole or Partial: Whole Liver  Biliary Anastomosis: End to End  Arterial Anatomy: Standard  Subjective:     History of Present Illness:  Ms. Marcie Bazan is a 52 y.o. 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 Post operative course complicated by hematoma evac on 18 and neutropenia requiring multiple doses of neupogen. Pt recently admitted with rejection. Liver biopsy was performed on showing mild ACR. She was treated with SM x 3 (, , and 3/1) with some improvement in LFTs seen. Of significance, liver pathology also showed moderate steatohepatitis. PETH from 2/15 negative.      Pt is being admitted as transfer from [facility] for acute volume overload. Pt reported gaining approx 30 lbs in 8 hours prior to presenting to the ED. She noticed swelling especially in her abdomen and legs with some SOB. She was given lasix at OSH prior to being transferred to [facility] [place]. Upon arrival pt reports feeling much better, she still has some lower extremity edema as well as ascites, but reports it has significantly improved. She denies fever, nausea, vomiting, SOB, CP, abdominal pain, diarrhea. She has not taken any new medications nor eaten any new foods. , labs this morning significant for elevated LFTs and hypokalemia. CXR appears clear. Repeat labs, infectious lópez, echo, US liver, EKG and thyroid studies ordered.    Hospital Course:  Interval history: no acute events over night.  Edema  improved.  I/O -900ml/24hr.  Pt denies SOB.  Appetite fair.  Albumin 2.3.  Dietican consulted.  Last liver bx 2/28/18 moderate steatohepatitis with scattered lobular neutrophils and apoptotic bodies, macrosteatosis and diffuse, medium drops, 40% and microsteatosis, 50%.  Pt has received solumedrol pulse (x 4 doses).  Plan to hold additional SM.  Pt scheduled for liver bx today.  LFTs slightly improved but remain well above normal. Monitor.      Subjective:     Chief Complaint/Reason for Admission:     History of Present Illness:  Ms. Marcie Bazan is a 52 y.o. 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 Post operative course complicated by hematoma evac on 1/1/18 and neutropenia requiring multiple doses of neupogen. Pt recently admitted with rejection. Liver biopsy was performed on showing mild ACR. She was treated with SM x 3 (2/27, 2/28, and 3/1) with some improvement in LFTs seen. Of significance, liver pathology also showed moderate steatohepatitis. PETH from 2/15 negative.      Pt is being admitted as transfer from [facility] for acute volume overload. Pt reported gaining approx 30 lbs in 8 hours prior to presenting to the ED. She noticed swelling especially in her abdomen and legs with some SOB. She was given lasix at OSH prior to being transferred to [facility]. Upon arrival pt reports feeling much better, she still has some lower extremity edema as well as ascites, but reports it has significantly improved. She denies fever, nausea, vomiting, SOB, CP, abdominal pain, diarrhea. She has not taken any new medications nor eaten any new foods. , labs this morning significant for elevated LFTs and hypokalemia. CXR appears clear. Repeat labs, infectious lópez, echo, US liver, EKG and thyroid studies ordered.      PTA Medications   Medication Sig    aspirin (ECOTRIN) 81 MG EC tablet Take 1 tablet (81 mg total) by mouth once daily.    atorvastatin (LIPITOR) 40 MG tablet Take  1 tablet (40 mg total) by mouth once daily.    bisacodyl (DULCOLAX) 5 mg EC tablet Take 1-2 tablets (5-10 mg total) by mouth daily as needed for Constipation.    cyclobenzaprine (FLEXERIL) 5 MG tablet Take 1 tablet (5 mg total) by mouth 3 (three) times daily as needed for Muscle spasms.    famotidine (PEPCID) 20 MG tablet Take 1 tablet (20 mg total) by mouth every evening.    gabapentin (NEURONTIN) 300 MG capsule Take 1 capsule (300 mg total) by mouth 2 (two) times daily.    HYDROmorphone (DILAUDID) 4 MG tablet Take 0.5-1 tablets (2-4 mg total) by mouth every 6 (six) hours as needed.    magnesium oxide (MAG-OX) 400 mg tablet Take 1 tablet (400 mg total) by mouth 2 (two) times daily.    mycophenolate (CELLCEPT) 250 mg Cap Take 4 capsules (1,000 mg total) by mouth 2 (two) times daily.    NIFEdipine (PROCARDIA-XL) 60 MG (OSM) 24 hr tablet Take 1 tablet (60 mg total) by mouth once daily.    nystatin (MYCOSTATIN) 100,000 unit/mL suspension Take 5 mLs (500,000 Units total) by mouth 3 (three) times daily after meals. STOP 3/21/18    ondansetron (ZOFRAN-ODT) 4 MG TbDL DISSOLVE 1 TABLET BY MOUTH EVERY 8 HOURS AS NEEDED FOR NAUSEA AND VOMITTING    predniSONE (DELTASONE) 10 MG tablet Take PO BID: 3/3 80 mg, 3/4 60 mg, 3/5 40 mg, 3/6 20 mg; PO QD: 3/7-3/13 20 mg, 3/14-3/20 15 mg, 3/21-3/27 10 mg, 3/28-4/3 5 mg; STOP 4/4    tacrolimus (PROGRAF) 1 MG Cap Take 5 capsules (5 mg total) by mouth every 12 (twelve) hours.    traZODone (DESYREL) 50 MG tablet Take 0.5 tablets (25 mg total) by mouth every evening.       Review of patient's allergies indicates:   Allergen Reactions    Ferrous sulfate Other (See Comments)     Patient states the pill makes her sick. She stated she would rather have a shot       Past Medical History:   Diagnosis Date    Alcohol abuse     Alcoholic hepatitis     Anemia of chronic disease     Coagulopathy     Encounter for blood transfusion     End stage liver disease     Hypertension      "Hyponatremia     Liver cirrhosis     Liver transplant candidate     Obesity (BMI 30-39.9) 1/5/2016     Past Surgical History:   Procedure Laterality Date    BREAST BIOPSY      CHOLECYSTECTOMY      COLONOSCOPY N/A 12/1/2017    Procedure: COLONOSCOPY;  Surgeon: Filemon Fuentes MD;  Location: [facility] ([address] [place] [address]);  Service: Endoscopy;  Laterality: N/A;    COLONOSCOPY N/A 12/5/2017    Procedure: COLONOSCOPY;  Surgeon: José Chavira MD;  Location: [facility] ([address] [place] [address]);  Service: Endoscopy;  Laterality: N/A;    HYSTERECTOMY      LIVER TRANSPLANT  12/2017     Family History     Problem Relation (Age of Onset)    Breast cancer Paternal Aunt    Depression Maternal Grandfather    Diabetes Father, Sister    Hypertension Mother, Father        Social History Main Topics    Smoking status: Never Smoker    Smokeless tobacco: Never Used    Alcohol use Yes      Comment: Currently admitted to inpatient rehab 7/2017    Drug use: No    Sexual activity: Not Currently        Review of Systems   Constitutional: Negative for appetite change, chills, fatigue and fever.   Respiratory: Negative for cough, shortness of breath, wheezing and stridor.    Cardiovascular: Positive for leg swelling. Negative for chest pain and palpitations.   Gastrointestinal: Positive for abdominal distention. Negative for abdominal pain, blood in stool, constipation, diarrhea, nausea and vomiting.   Genitourinary: Negative for dysuria, frequency, hematuria and urgency.   Skin: Negative.    Allergic/Immunologic: Positive for immunocompromised state.   Neurological: Negative for dizziness, weakness, numbness and headaches.     Objective:     Vital Signs (Most Recent):  Temp: 97.8 °F (36.6 °C) (03/12/18 1207)  Pulse: 69 (03/12/18 1441)  Resp: 15 (03/12/18 1441)  BP: (!) 160/82 (03/12/18 1441)  SpO2: 100 % (03/12/18 1441)  Height: 5' 3" (160 cm)  Weight: 64.7 kg (142 lb 10.2 oz)  Body mass index is 25.27 kg/m².     Physical Exam   Constitutional: She is " oriented to person, place, and time. She appears well-developed. No distress.   HENT:   Head: Normocephalic and atraumatic.   Mouth/Throat: No oropharyngeal exudate.   Eyes: EOM are normal. Pupils are equal, round, and reactive to light. No scleral icterus.   Neck: Normal range of motion. Neck supple. No thyromegaly present.   Cardiovascular: Normal rate, regular rhythm, normal heart sounds and intact distal pulses.  Exam reveals no gallop and no friction rub.    No murmur heard.  Pulmonary/Chest: Effort normal and breath sounds normal. No respiratory distress. She has no wheezes. She exhibits no tenderness.   Abdominal: Soft. Bowel sounds are normal. She exhibits distension. There is no tenderness.   Musculoskeletal: Normal range of motion. She exhibits edema (trace BLE).   Neurological: She is alert and oriented to person, place, and time.   Skin: Skin is warm and dry. Capillary refill takes 2 to 3 seconds. She is not diaphoretic.   Psychiatric: She has a normal mood and affect. Her behavior is normal. Judgment and thought content normal.   Nursing note and vitals reviewed.      Laboratory  CBC:     Recent Labs  Lab 03/10/18  0526 03/11/18  0505 03/12/18  0504   WBC 2.09* 8.73 6.55   RBC 3.28* 3.15* 3.06*   HGB 9.3* 9.0* 8.7*   HCT 28.5* 28.0* 27.0*    217 238   MCV 87 89 88   MCH 28.4 28.6 28.4   MCHC 32.6 32.1 32.2     CMP:     Recent Labs  Lab 03/10/18  0526 03/11/18  0505 03/12/18  0504   * 204* 158*   CALCIUM 8.2* 8.3* 8.0*   ALBUMIN 2.5* 2.4* 2.3*   PROT 5.3* 5.2* 4.9*    138 138   K 3.7 3.5 3.2*   CO2 25 26 26    104 105   BUN 18 20 24*   CREATININE 0.7 0.8 0.9   ALKPHOS 389* 338* 294*   * 231* 167*   * 73* 47*     Tacrolimus Lvl   Date Value Ref Range Status   03/12/2018 9.9 5.0 - 15.0 ng/mL Final     Comment:     Testing performed by Liquid Chromatography-Tandem  Mass Spectrometry (LC-MS/MS).  This test was developed and its performance characteristics  determined  by [facility], Department of Pathology  and Laboratory Medicine in a manner consistent with CLIA  requirements. It has not been cleared or approved by the [place]  Food and Drug Administration.  This test is used for clinical   purposes.  It should not be regarded as investigational or for  research.     03/11/2018 9.4 5.0 - 15.0 ng/mL Final     Comment:     Testing performed by Liquid Chromatography-Tandem  Mass Spectrometry (LC-MS/MS).  This test was developed and its performance characteristics  determined by [facility], Department of Pathology  and Laboratory Medicine in a manner consistent with CLIA  requirements. It has not been cleared or approved by the [place]  Food and Drug Administration.  This test is used for clinical   purposes.  It should not be regarded as investigational or for  research.     03/08/2018 6.8 5.0 - 15.0 ng/mL Final     Comment:     Testing performed by Liquid Chromatography-Tandem  Mass Spectrometry (LC-MS/MS).  This test was developed and its performance characteristics  determined by [facility], Department of Pathology  and Laboratory Medicine in a manner consistent with CLIA  requirements. It has not been cleared or approved by the [place]  Food and Drug Administration.  This test is used for clinical   purposes.  It should not be regarded as investigational or for  research.     03/05/2018 3.8 (L) 5.0 - 15.0 ng/mL Final     Comment:     Testing performed by Liquid Chromatography-Tandem  Mass Spectrometry (LC-MS/MS).  This test was developed and its performance characteristics  determined by [facility], Department of Pathology  and Laboratory Medicine in a manner consistent with CLIA  requirements. It has not been cleared or approved by the [place]  Food and Drug Administration.  This test is used for clinical   purposes.  It should not be regarded as investigational or for  research.         Diagnostic Results:  None    Assessment/Plan:     * Elevated  liver enzymes    - admitted w elevated LFT's, pt recently treated w SM for mod ACR 2/27, 2/28, 3/1.  Pt readmitted w elevated LFT's, suspect persistent rejection.  - treat with solumedrol pulse, dose # 4 on 3/11  - Liver biopsy today.          Accelerated liver transplant rejection    - plan for liver bx today, may need Thymo based on liver bx results.            Liver transplant 12/26/2017 for ETOH    - s/p DBD OLTxp 12/26/17 for ETOH/PERKINS ESLD admitted w lfts elevated.        Long-term use of immunosuppressant medication    - see prophylactic immunosuppression          Prophylactic immunotherapy    - continue prograf, steroids, and cellcept  - monitor prograf levels and adjust for therapeutic level          At risk for opportunistic infections    - weekly cmv pcr,  No valcyte due to leukopenia  - pentamidine for pcp            Anemia of chronic disease    - h/h stable, monitor labs daily        Edema    - improved today.  Cont w abdominal distention only.  Assess need for Lasix on daily basis. Monitor            VTE Risk Mitigation         Ordered     heparin (porcine) injection 5,000 Units  Every 8 hours     Route:  Subcutaneous        03/09/18 1350     Medium Risk of VTE  Once      03/09/18 1350     Place sequential compression device  Until discontinued      03/09/18 1350          The patients clinical status was discussed at multidisplinary rounds, involving transplant surgery, transplant medicine, pharmacy, nursing, nutrition, and social work    Discharge Planning:  No plan for discharge today.    Renetta Mcconnell NP  Liver Transplant  [facility]

## 2018-03-12 NOTE — PROGRESS NOTES
Pt arrived to [facility] bed 4 for 45 minute hour post transjugular liver biopsy recovery. Report received from GUIDO Diggs. Pt denies pain/discomfort. Dressing CDI. VSS. No acute events. See flow sheets for post procedure monitoring.

## 2018-03-12 NOTE — PLAN OF CARE
Problem: Patient Care Overview  Goal: Plan of Care Review  Outcome: Ongoing (interventions implemented as appropriate)  Plan of care reviewed with patient. Pt verbalized understanding. Pt AAOX4. Pt free from falls, injuries and trauma.  Pt free from skin breakdown.  Pt VSS and in NAD.  Pt afebrile.  Pt had no complaints of SOB, N/V or CP. Pt ambulated independently in room. Pt had no complaints of pain this shift. Adequate UOP this shift. 5+ BM this shift. Pt NPO after midnight for liver BX in morning. Pt had uneventful evening. Pt in low locked bed with personal items and call light within reach. Fall precautions maintained.

## 2018-03-12 NOTE — H&P
Inpatient Radiology Pre-procedure Note    History of Present Illness:  Marcie Bazan is a 52 y.o. female who presents for transjugular liver bx. S/p OLT with liver dysfunction. On ASA. No ascites.    Admission H&P reviewed.  Past Medical History:   Diagnosis Date    Alcohol abuse     Alcoholic hepatitis     Anemia of chronic disease     Coagulopathy     Encounter for blood transfusion     End stage liver disease     Hypertension     Hyponatremia     Liver cirrhosis     Liver transplant candidate     Obesity (BMI 30-39.9) 1/5/2016     Past Surgical History:   Procedure Laterality Date    BREAST BIOPSY      CHOLECYSTECTOMY      COLONOSCOPY N/A 12/1/2017    Procedure: COLONOSCOPY;  Surgeon: Filemon Fuentes MD;  Location: [facility] ENDO (2ND FLR);  Service: Endoscopy;  Laterality: N/A;    COLONOSCOPY N/A 12/5/2017    Procedure: COLONOSCOPY;  Surgeon: José Chavira MD;  Location: [facility] ENDO (2ND FLR);  Service: Endoscopy;  Laterality: N/A;    HYSTERECTOMY      LIVER TRANSPLANT  12/2017       Review of Systems:   As documented in primary team H&P    Home Meds:   Prior to Admission medications    Medication Sig Start Date End Date Taking? Authorizing Provider   aspirin (ECOTRIN) 81 MG EC tablet Take 1 tablet (81 mg total) by mouth once daily. 1/5/18 1/5/19  Jarrod Regan MD   atorvastatin (LIPITOR) 40 MG tablet Take 1 tablet (40 mg total) by mouth once daily. 3/2/18 3/2/19  Joselin Souza MD   bisacodyl (DULCOLAX) 5 mg EC tablet Take 1-2 tablets (5-10 mg total) by mouth daily as needed for Constipation. 1/4/18   Jarrod Regan MD   cyclobenzaprine (FLEXERIL) 5 MG tablet Take 1 tablet (5 mg total) by mouth 3 (three) times daily as needed for Muscle spasms. 3/2/18 3/12/18  Rayne Currie PA-C   famotidine (PEPCID) 20 MG tablet Take 1 tablet (20 mg total) by mouth every evening. 12/27/17   Franco Lawton Jr., MD   gabapentin (NEURONTIN) 300 MG capsule Take 1 capsule (300 mg total) by mouth 2 (two) times  daily. 3/2/18 3/2/19  Joselin Souza MD   HYDROmorphone (DILAUDID) 4 MG tablet Take 0.5-1 tablets (2-4 mg total) by mouth every 6 (six) hours as needed. 2/2/18   Harry Delarosa MD   magnesium oxide (MAG-OX) 400 mg tablet Take 1 tablet (400 mg total) by mouth 2 (two) times daily. 2/15/18   Ashley Garduno MD   mycophenolate (CELLCEPT) 250 mg Cap Take 4 capsules (1,000 mg total) by mouth 2 (two) times daily. 3/2/18 3/2/19  Joselin Souza MD   NIFEdipine (PROCARDIA-XL) 60 MG (OSM) 24 hr tablet Take 1 tablet (60 mg total) by mouth once daily. 2/16/18 2/16/19  Ashley Garduno MD   nystatin (MYCOSTATIN) 100,000 unit/mL suspension Take 5 mLs (500,000 Units total) by mouth 3 (three) times daily after meals. STOP 3/21/18 3/2/18   Joselin Souza MD   ondansetron (ZOFRAN-ODT) 4 MG TbDL DISSOLVE 1 TABLET BY MOUTH EVERY 8 HOURS AS NEEDED FOR NAUSEA AND VOMITTING 3/2/18   Joselin Souza MD   predniSONE (DELTASONE) 10 MG tablet Take PO BID: 3/3 80 mg, 3/4 60 mg, 3/5 40 mg, 3/6 20 mg; PO QD: 3/7-3/13 20 mg, 3/14-3/20 15 mg, 3/21-3/27 10 mg, 3/28-4/3 5 mg; STOP 4/4 3/2/18   Joselin Souza MD   tacrolimus (PROGRAF) 1 MG Cap Take 5 capsules (5 mg total) by mouth every 12 (twelve) hours. 3/8/18   Joselin oSuza MD   traZODone (DESYREL) 50 MG tablet Take 0.5 tablets (25 mg total) by mouth every evening. 1/5/18 1/5/19  Jarrod Regan MD     Scheduled Meds:    aspirin  81 mg Oral Daily    atorvastatin  40 mg Oral Daily    famotidine  20 mg Oral QHS    furosemide  40 mg Oral Daily    gabapentin  300 mg Oral BID    heparin (porcine)  5,000 Units Subcutaneous Q8H    magnesium oxide  400 mg Oral BID    mycophenolate  1,000 mg Oral BID    NIFEdipine  60 mg Oral Daily    nystatin  500,000 Units Oral TID PC    tacrolimus  6 mg Oral BID    traZODone  25 mg Oral QHS     Continuous Infusions:   PRN Meds:acetaminophen, bisacodyl, cyclobenzaprine, dextrose 50%, dextrose 50%, diphenhydrAMINE, glucagon (human recombinant), glucose, glucose, insulin  aspart U-100, ondansetron, sodium chloride 0.9%  Anticoagulants/Antiplatelets: aspirin    Allergies:   Review of patient's allergies indicates:   Allergen Reactions    Ferrous sulfate Other (See Comments)     Patient states the pill makes her sick. She stated she would rather have a shot     Sedation Hx: have not been any systemic reactions    Labs:    Recent Labs  Lab 03/09/18  1428   INR 1.0       Recent Labs  Lab 03/12/18  0504   WBC 6.55   HGB 8.7*   HCT 27.0*   MCV 88         Recent Labs  Lab 03/08/18  0725  03/12/18  0504   *  < > 158*     < > 138   K 3.3*  < > 3.2*     < > 105   CO2 24  < > 26   BUN 25*  < > 24*   CREATININE 0.7  < > 0.9   CALCIUM 8.9  < > 8.0*   MG  --   < > 1.4*   *  < > 167*   *  < > 47*   ALBUMIN 3.0*  < > 2.3*   BILITOT 1.0  < > 0.7   BILIDIR 0.6*  --   --    < > = values in this interval not displayed.      Vitals:  Temp: 98 °F (36.7 °C) (03/12/18 0744)  Pulse: 66 (03/12/18 0744)  Resp: 18 (03/12/18 0408)  BP: (!) 170/81 (03/12/18 0744)  SpO2: 99 % (03/12/18 0744)     Physical Exam:  ASA: 3  Mallampati: 1         Plan: xjug  Sedation Plan: moderate    Filemon Wong MD  Department of Radiology  Pager: [phone]

## 2018-03-12 NOTE — ASSESSMENT & PLAN NOTE
- continue prograf, steroids, and cellcept  - monitor prograf levels and adjust for therapeutic level

## 2018-03-13 LAB
ALBUMIN SERPL BCP-MCNC: 2.3 G/DL
ALP SERPL-CCNC: 305 U/L
ALT SERPL W/O P-5'-P-CCNC: 188 U/L
ANION GAP SERPL CALC-SCNC: 10 MMOL/L
AST SERPL-CCNC: 82 U/L
BASOPHILS # BLD AUTO: 0 K/UL
BASOPHILS NFR BLD: 0 %
BILIRUB SERPL-MCNC: 0.6 MG/DL
BUN SERPL-MCNC: 25 MG/DL
CALCIUM SERPL-MCNC: 7.9 MG/DL
CHLORIDE SERPL-SCNC: 107 MMOL/L
CO2 SERPL-SCNC: 25 MMOL/L
CREAT SERPL-MCNC: 0.7 MG/DL
DIFFERENTIAL METHOD: ABNORMAL
EOSINOPHIL # BLD AUTO: 0 K/UL
EOSINOPHIL NFR BLD: 0.2 %
ERYTHROCYTE [DISTWIDTH] IN BLOOD BY AUTOMATED COUNT: 20.9 %
EST. GFR  (AFRICAN AMERICAN): >60 ML/MIN/1.73 M^2
EST. GFR  (NON AFRICAN AMERICAN): >60 ML/MIN/1.73 M^2
GLUCOSE SERPL-MCNC: 97 MG/DL
HCT VFR BLD AUTO: 27.7 %
HGB BLD-MCNC: 8.8 G/DL
IMM GRANULOCYTES # BLD AUTO: 0.03 K/UL
IMM GRANULOCYTES NFR BLD AUTO: 0.7 %
LYMPHOCYTES # BLD AUTO: 1.3 K/UL
LYMPHOCYTES NFR BLD: 29.3 %
MAGNESIUM SERPL-MCNC: 1.3 MG/DL
MCH RBC QN AUTO: 28.4 PG
MCHC RBC AUTO-ENTMCNC: 31.8 G/DL
MCV RBC AUTO: 89 FL
MONOCYTES # BLD AUTO: 1.2 K/UL
MONOCYTES NFR BLD: 27.3 %
NEUTROPHILS # BLD AUTO: 1.8 K/UL
NEUTROPHILS NFR BLD: 42.5 %
NRBC BLD-RTO: 0 /100 WBC
PHOSPHATE SERPL-MCNC: 2.6 MG/DL
PLATELET # BLD AUTO: 210 K/UL
PMV BLD AUTO: 10.5 FL
POCT GLUCOSE: 146 MG/DL (ref 70–110)
POCT GLUCOSE: 82 MG/DL (ref 70–110)
POCT GLUCOSE: 94 MG/DL (ref 70–110)
POCT GLUCOSE: 99 MG/DL (ref 70–110)
POTASSIUM SERPL-SCNC: 3.3 MMOL/L
PROT SERPL-MCNC: 4.8 G/DL
RBC # BLD AUTO: 3.1 M/UL
SODIUM SERPL-SCNC: 142 MMOL/L
TACROLIMUS BLD-MCNC: 10.5 NG/ML
WBC # BLD AUTO: 4.33 K/UL

## 2018-03-13 PROCEDURE — 99233 SBSQ HOSP IP/OBS HIGH 50: CPT | Mod: 24,,, | Performed by: NURSE PRACTITIONER

## 2018-03-13 PROCEDURE — 63600175 PHARM REV CODE 636 W HCPCS: Performed by: PHYSICIAN ASSISTANT

## 2018-03-13 PROCEDURE — 63600175 PHARM REV CODE 636 W HCPCS: Performed by: NURSE PRACTITIONER

## 2018-03-13 PROCEDURE — 02HV33Z INSERTION OF INFUSION DEVICE INTO SUPERIOR VENA CAVA, PERCUTANEOUS APPROACH: ICD-10-PCS | Performed by: TRANSPLANT SURGERY

## 2018-03-13 PROCEDURE — 84100 ASSAY OF PHOSPHORUS: CPT

## 2018-03-13 PROCEDURE — 25000003 PHARM REV CODE 250: Performed by: PHYSICIAN ASSISTANT

## 2018-03-13 PROCEDURE — 85025 COMPLETE CBC W/AUTO DIFF WBC: CPT

## 2018-03-13 PROCEDURE — 83735 ASSAY OF MAGNESIUM: CPT

## 2018-03-13 PROCEDURE — 36556 INSERT NON-TUNNEL CV CATH: CPT

## 2018-03-13 PROCEDURE — 25000003 PHARM REV CODE 250: Performed by: NURSE PRACTITIONER

## 2018-03-13 PROCEDURE — 97802 MEDICAL NUTRITION INDIV IN: CPT | Performed by: DIETITIAN, REGISTERED

## 2018-03-13 PROCEDURE — 80197 ASSAY OF TACROLIMUS: CPT

## 2018-03-13 PROCEDURE — 80053 COMPREHEN METABOLIC PANEL: CPT

## 2018-03-13 PROCEDURE — 63600175 PHARM REV CODE 636 W HCPCS: Performed by: PEDIATRICS

## 2018-03-13 PROCEDURE — 20600001 HC STEP DOWN PRIVATE ROOM

## 2018-03-13 PROCEDURE — 36415 COLL VENOUS BLD VENIPUNCTURE: CPT

## 2018-03-13 RX ORDER — POTASSIUM CHLORIDE 750 MG/1
40 CAPSULE, EXTENDED RELEASE ORAL EVERY 4 HOURS
Status: COMPLETED | OUTPATIENT
Start: 2018-03-13 | End: 2018-03-13

## 2018-03-13 RX ORDER — TRAMADOL HYDROCHLORIDE 50 MG/1
50 TABLET ORAL EVERY 6 HOURS PRN
Status: DISCONTINUED | OUTPATIENT
Start: 2018-03-14 | End: 2018-03-17 | Stop reason: HOSPADM

## 2018-03-13 RX ORDER — DIPHENHYDRAMINE HCL 25 MG
25 CAPSULE ORAL ONCE
Status: COMPLETED | OUTPATIENT
Start: 2018-03-13 | End: 2018-03-13

## 2018-03-13 RX ORDER — ACETAMINOPHEN 325 MG/1
650 TABLET ORAL ONCE
Status: COMPLETED | OUTPATIENT
Start: 2018-03-13 | End: 2018-03-13

## 2018-03-13 RX ORDER — ACETAMINOPHEN 325 MG/1
650 TABLET ORAL ONCE AS NEEDED
Status: ACTIVE | OUTPATIENT
Start: 2018-03-13 | End: 2018-03-13

## 2018-03-13 RX ORDER — FUROSEMIDE 10 MG/ML
40 INJECTION INTRAMUSCULAR; INTRAVENOUS ONCE
Status: COMPLETED | OUTPATIENT
Start: 2018-03-13 | End: 2018-03-13

## 2018-03-13 RX ORDER — LANOLIN ALCOHOL/MO/W.PET/CERES
800 CREAM (GRAM) TOPICAL 2 TIMES DAILY
Status: DISCONTINUED | OUTPATIENT
Start: 2018-03-13 | End: 2018-03-17 | Stop reason: HOSPADM

## 2018-03-13 RX ORDER — DIPHENHYDRAMINE HCL 50 MG
50 CAPSULE ORAL ONCE AS NEEDED
Status: ACTIVE | OUTPATIENT
Start: 2018-03-13 | End: 2018-03-13

## 2018-03-13 RX ORDER — EPINEPHRINE 1 MG/ML
1 INJECTION, SOLUTION INTRACARDIAC; INTRAMUSCULAR; INTRAVENOUS; SUBCUTANEOUS ONCE AS NEEDED
Status: DISPENSED | OUTPATIENT
Start: 2018-03-13 | End: 2018-03-13

## 2018-03-13 RX ADMIN — DIPHENHYDRAMINE HYDROCHLORIDE 25 MG: 25 CAPSULE ORAL at 06:03

## 2018-03-13 RX ADMIN — TRAZODONE HYDROCHLORIDE 25 MG: 50 TABLET ORAL at 09:03

## 2018-03-13 RX ADMIN — ACETAMINOPHEN 650 MG: 325 TABLET ORAL at 06:03

## 2018-03-13 RX ADMIN — MAGNESIUM OXIDE TAB 400 MG (241.3 MG ELEMENTAL MG) 400 MG: 400 (241.3 MG) TAB at 08:03

## 2018-03-13 RX ADMIN — TACROLIMUS 6 MG: 1 CAPSULE ORAL at 07:03

## 2018-03-13 RX ADMIN — GABAPENTIN 300 MG: 300 CAPSULE ORAL at 09:03

## 2018-03-13 RX ADMIN — ANTI-THYMOCYTE GLOBULIN (RABBIT) 100 MG: 5 INJECTION, POWDER, LYOPHILIZED, FOR SOLUTION INTRAVENOUS at 07:03

## 2018-03-13 RX ADMIN — GABAPENTIN 300 MG: 300 CAPSULE ORAL at 08:03

## 2018-03-13 RX ADMIN — NYSTATIN 500000 UNITS: 500000 SUSPENSION ORAL at 06:03

## 2018-03-13 RX ADMIN — TACROLIMUS 6 MG: 1 CAPSULE ORAL at 06:03

## 2018-03-13 RX ADMIN — POTASSIUM CHLORIDE 40 MEQ: 750 CAPSULE, EXTENDED RELEASE ORAL at 01:03

## 2018-03-13 RX ADMIN — MAGNESIUM OXIDE TAB 400 MG (241.3 MG ELEMENTAL MG) 800 MG: 400 (241.3 MG) TAB at 09:03

## 2018-03-13 RX ADMIN — POTASSIUM CHLORIDE 40 MEQ: 750 CAPSULE, EXTENDED RELEASE ORAL at 10:03

## 2018-03-13 RX ADMIN — FUROSEMIDE 40 MG: 40 TABLET ORAL at 08:03

## 2018-03-13 RX ADMIN — POTASSIUM CHLORIDE 40 MEQ: 750 CAPSULE, EXTENDED RELEASE ORAL at 06:03

## 2018-03-13 RX ADMIN — NYSTATIN 500000 UNITS: 500000 SUSPENSION ORAL at 07:03

## 2018-03-13 RX ADMIN — NYSTATIN 500000 UNITS: 500000 SUSPENSION ORAL at 01:03

## 2018-03-13 RX ADMIN — FUROSEMIDE 40 MG: 10 INJECTION, SOLUTION INTRAMUSCULAR; INTRAVENOUS at 10:03

## 2018-03-13 RX ADMIN — DEXTROSE: 50 INJECTION, SOLUTION INTRAVENOUS at 06:03

## 2018-03-13 RX ADMIN — ATORVASTATIN CALCIUM 40 MG: 20 TABLET, FILM COATED ORAL at 07:03

## 2018-03-13 RX ADMIN — FAMOTIDINE 20 MG: 20 TABLET, FILM COATED ORAL at 09:03

## 2018-03-13 RX ADMIN — ASPIRIN 81 MG: 81 TABLET, COATED ORAL at 08:03

## 2018-03-13 RX ADMIN — MYCOPHENOLATE MOFETIL 1000 MG: 250 CAPSULE ORAL at 07:03

## 2018-03-13 RX ADMIN — TRAMADOL HYDROCHLORIDE 50 MG: 50 TABLET, COATED ORAL at 11:03

## 2018-03-13 RX ADMIN — NIFEDIPINE 60 MG: 60 TABLET, FILM COATED, EXTENDED RELEASE ORAL at 07:03

## 2018-03-13 RX ADMIN — CYCLOBENZAPRINE HYDROCHLORIDE 5 MG: 5 TABLET, FILM COATED ORAL at 10:03

## 2018-03-13 RX ADMIN — MYCOPHENOLATE MOFETIL 1000 MG: 250 CAPSULE ORAL at 09:03

## 2018-03-13 NOTE — PROCEDURES
"Marcie Bazan is a 52 y.o. female patient.    Temp: 97.8 °F (36.6 °C) (03/13/18 1535)  Pulse: 96 (03/13/18 1535)  Resp: 16 (03/13/18 1535)  BP: (!) 142/75 (03/13/18 1535)  SpO2: 100 % (03/13/18 1535)  Weight:  (~10-20# of fluid shifts) (03/13/18 1401)  Height: 5' 2.99" (160 cm) (03/13/18 1413)       Central Line  Date/Time: 3/13/2018 4:33 PM  Location procedure was performed: [facility] LIVER TRANSPLANT  Performed by: ANJALI BRANCH  Pre-operative Diagnosis: Graft rejection  Post-operative diagnosis: Graft rejection  Consent Done: Yes  Time out: Immediately prior to procedure a "time out" was called to verify the correct patient, procedure, equipment, support staff and site/side marked as required.  Indications: med administration  Anesthesia: local infiltration    Anesthesia:  Local Anesthetic: lidocaine 1% without epinephrine  Anesthetic total: 3 mL  Preparation: skin prepped with chlorhexidine (without alcohol)  Skin prep agent dried: skin prep agent completely dried prior to procedure  Sterile barriers: all five maximum sterile barriers used - cap, mask, sterile gown, sterile gloves, and large sterile sheet  Hand hygiene: hand hygiene performed prior to central venous catheter insertion  Location details: right internal jugular  Catheter type: triple lumen  Catheter size: 7 Fr  Catheter Length: 16cm    Ultrasound guidance: yes  Vessel Caliber: medium, patent, compressibility normal  Needle advanced into vessel with real time Ultrasound guidance.  Guidewire confirmed in vessel.  Sterile sheath used.  Number of attempts: 1  Complications: none  Estimated blood loss (mL): 3  Post-procedure: line sutured,  chlorhexidine patch,  sterile dressing applied and blood return through all ports  Complications: No  Comments: Chest x-ray pending to verify placement          Anjali Branch  3/13/2018  "

## 2018-03-13 NOTE — ASSESSMENT & PLAN NOTE
- Liver bx 3/12, showing mild ACR.  Plan to place central line and start Thymo.  CD3 after third dose.

## 2018-03-13 NOTE — PROGRESS NOTES
[facility]  Liver Transplant  Progress Note    Patient Name: Marcie Bazan  MRN: [id number]  Admission Date: 3/9/2018  Hospital Length of Stay: 4 days  Code Status: Full Code  Primary Care Provider: ODILIA Wall  Post-Operative Day: 77    ORGAN:   LIVER  Disease Etiology: Alcoholic Cirrhosis  Donor Type:    - Brain Death  CDC High Risk:   No  Donor CMV Status:   Donor CMV Status: Negative  Donor HBcAB:   Negative  Donor HCV Status:   Negative  Whole or Partial: Whole Liver  Biliary Anastomosis: End to End  Arterial Anatomy: Standard  Subjective:     History of Present Illness:  Ms. Marcie Bazan is a 52 y.o. 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 Post operative course complicated by hematoma evac on 18 and neutropenia requiring multiple doses of neupogen. Pt recently admitted with rejection. Liver biopsy was performed on showing mild ACR. She was treated with SM x 3 (, , and 3/1) with some improvement in LFTs seen. Of significance, liver pathology also showed moderate steatohepatitis. PETH from 2/15 negative.      Pt is being admitted as transfer from [facility] for acute volume overload. Pt reported gaining approx 30 lbs in 8 hours prior to presenting to the ED. She noticed swelling especially in her abdomen and legs with some SOB. She was given lasix at OSH prior to being transferred to [facility]. Upon arrival pt reports feeling much better, she still has some lower extremity edema as well as ascites, but reports it has significantly improved. She denies fever, nausea, vomiting, SOB, CP, abdominal pain, diarrhea. She has not taken any new medications nor eaten any new foods. , labs this morning significant for elevated LFTs and hypokalemia. CXR appears clear. Repeat labs, infectious lópez, echo, US liver, EKG and thyroid studies ordered.    Hospital Course:  Interval history: no acute events over night.  Cont to  have abdominal swelling.  UOP decreased.  Will order Lasix 40 mg today.  Again reviewed US of liver from admit 3/9 that showed no significant ascites.  LFTs up today.  Liver bx path reviewed- showing mild ACR.  Plan to place central line for Thymo.  Cont to encourage low fat diet.  Appetite limited due to abdominal fullness.  (+) flatus.  (+) BMs.  Cont to trend LFTs.  Monitor.      Subjective:     Chief Complaint/Reason for Admission:     History of Present Illness:  Ms. Marcie Bazan is a 52 y.o. 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 Post operative course complicated by hematoma evac on 1/1/18 and neutropenia requiring multiple doses of neupogen. Pt recently admitted with rejection. Liver biopsy was performed on showing mild ACR. She was treated with SM x 3 (2/27, 2/28, and 3/1) with some improvement in LFTs seen. Of significance, liver pathology also showed moderate steatohepatitis. PETH from 2/15 negative.      Pt is being admitted as transfer from [facility] for acute volume overload. Pt reported gaining approx 30 lbs in 8 hours prior to presenting to the ED. She noticed swelling especially in her abdomen and legs with some SOB. She was given lasix at OSH prior to being transferred to [facility]. Upon arrival pt reports feeling much better, she still has some lower extremity edema as well as ascites, but reports it has significantly improved. She denies fever, nausea, vomiting, SOB, CP, abdominal pain, diarrhea. She has not taken any new medications nor eaten any new foods. , labs this morning significant for elevated LFTs and hypokalemia. CXR appears clear. Repeat labs, infectious lópez, echo, US liver, EKG and thyroid studies ordered.      PTA Medications   Medication Sig    aspirin (ECOTRIN) 81 MG EC tablet Take 1 tablet (81 mg total) by mouth once daily.    atorvastatin (LIPITOR) 40 MG tablet Take 1 tablet (40 mg total) by mouth once daily.    bisacodyl  (DULCOLAX) 5 mg EC tablet Take 1-2 tablets (5-10 mg total) by mouth daily as needed for Constipation.    [] cyclobenzaprine (FLEXERIL) 5 MG tablet Take 1 tablet (5 mg total) by mouth 3 (three) times daily as needed for Muscle spasms.    famotidine (PEPCID) 20 MG tablet Take 1 tablet (20 mg total) by mouth every evening.    gabapentin (NEURONTIN) 300 MG capsule Take 1 capsule (300 mg total) by mouth 2 (two) times daily.    HYDROmorphone (DILAUDID) 4 MG tablet Take 0.5-1 tablets (2-4 mg total) by mouth every 6 (six) hours as needed.    magnesium oxide (MAG-OX) 400 mg tablet Take 1 tablet (400 mg total) by mouth 2 (two) times daily.    mycophenolate (CELLCEPT) 250 mg Cap Take 4 capsules (1,000 mg total) by mouth 2 (two) times daily.    NIFEdipine (PROCARDIA-XL) 60 MG (OSM) 24 hr tablet Take 1 tablet (60 mg total) by mouth once daily.    nystatin (MYCOSTATIN) 100,000 unit/mL suspension Take 5 mLs (500,000 Units total) by mouth 3 (three) times daily after meals. STOP 3/21/18    ondansetron (ZOFRAN-ODT) 4 MG TbDL DISSOLVE 1 TABLET BY MOUTH EVERY 8 HOURS AS NEEDED FOR NAUSEA AND VOMITTING    predniSONE (DELTASONE) 10 MG tablet Take PO BID: 3/3 80 mg, 3/4 60 mg, 3/5 40 mg, 3/6 20 mg; PO QD: 3/7-3/13 20 mg, 3/14-3/20 15 mg, 3/21-3/27 10 mg, 3/28-4/3 5 mg; STOP     tacrolimus (PROGRAF) 1 MG Cap Take 5 capsules (5 mg total) by mouth every 12 (twelve) hours.    traZODone (DESYREL) 50 MG tablet Take 0.5 tablets (25 mg total) by mouth every evening.       Review of patient's allergies indicates:   Allergen Reactions    Ferrous sulfate Other (See Comments)     Patient states the pill makes her sick. She stated she would rather have a shot       Past Medical History:   Diagnosis Date    Alcohol abuse     Alcoholic hepatitis     Anemia of chronic disease     Coagulopathy     Encounter for blood transfusion     End stage liver disease     Hypertension     Hyponatremia     Liver cirrhosis     Liver  "transplant candidate     Obesity (BMI 30-39.9) 1/5/2016     Past Surgical History:   Procedure Laterality Date    BREAST BIOPSY      CHOLECYSTECTOMY      COLONOSCOPY N/A 12/1/2017    Procedure: COLONOSCOPY;  Surgeon: Filemon Fuentes MD;  Location: [facility] ([address] [place] [address]);  Service: Endoscopy;  Laterality: N/A;    COLONOSCOPY N/A 12/5/2017    Procedure: COLONOSCOPY;  Surgeon: José Chavira MD;  Location: [facility] END[facility] ([address] [place] [address]);  Service: Endoscopy;  Laterality: N/A;    HYSTERECTOMY      LIVER TRANSPLANT  12/2017     Family History     Problem Relation (Age of Onset)    Breast cancer Paternal Aunt    Depression Maternal Grandfather    Diabetes Father, Sister    Hypertension Mother, Father        Social History Main Topics    Smoking status: Never Smoker    Smokeless tobacco: Never Used    Alcohol use Yes      Comment: Currently admitted to inpatient rehab 7/2017    Drug use: No    Sexual activity: Not Currently        Review of Systems   Constitutional: Negative for appetite change, chills, fatigue and fever.   Respiratory: Negative for cough, shortness of breath, wheezing and stridor.    Cardiovascular: Negative for chest pain, palpitations and leg swelling.   Gastrointestinal: Positive for abdominal distention. Negative for abdominal pain, blood in stool, constipation, diarrhea, nausea and vomiting.   Genitourinary: Negative for dysuria, frequency, hematuria and urgency.   Skin: Negative.    Allergic/Immunologic: Positive for immunocompromised state.   Neurological: Negative for dizziness, weakness, numbness and headaches.   Hematological: Negative.    Psychiatric/Behavioral: Negative.      Objective:     Vital Signs (Most Recent):  Temp: 98.2 °F (36.8 °C) (03/13/18 1143)  Pulse: 83 (03/13/18 1143)  Resp: 16 (03/13/18 1143)  BP: (!) 152/90 (03/13/18 1143)  SpO2: 100 % (03/13/18 1143)  Height: 5' 3" (160 cm)  Weight: 64.7 kg (142 lb 10.2 oz)  Body mass index is 25.27 kg/m².     Physical Exam   Constitutional: " She is oriented to person, place, and time. She appears well-developed. No distress.   HENT:   Head: Normocephalic and atraumatic.   Mouth/Throat: No oropharyngeal exudate.   Eyes: EOM are normal. Pupils are equal, round, and reactive to light. No scleral icterus.   Neck: Normal range of motion. Neck supple. No thyromegaly present.   Cardiovascular: Normal rate, regular rhythm, normal heart sounds and intact distal pulses.  Exam reveals no gallop and no friction rub.    No murmur heard.  Pulmonary/Chest: Effort normal and breath sounds normal. No respiratory distress. She has no wheezes. She exhibits no tenderness.   Abdominal: Soft. Bowel sounds are normal. She exhibits distension. There is no tenderness.   Musculoskeletal: Normal range of motion. She exhibits edema (trace BLE).   Neurological: She is alert and oriented to person, place, and time.   Skin: Skin is warm and dry. Capillary refill takes 2 to 3 seconds. She is not diaphoretic.   Psychiatric: She has a normal mood and affect. Her behavior is normal. Judgment and thought content normal.   Nursing note and vitals reviewed.      Laboratory  CBC:     Recent Labs  Lab 03/11/18  0505 03/12/18  0504 03/13/18  0406   WBC 8.73 6.55 4.33   RBC 3.15* 3.06* 3.10*   HGB 9.0* 8.7* 8.8*   HCT 28.0* 27.0* 27.7*    238 210   MCV 89 88 89   MCH 28.6 28.4 28.4   MCHC 32.1 32.2 31.8*     CMP:     Recent Labs  Lab 03/11/18  0505 03/12/18  0504 03/13/18  0406   * 158* 97   CALCIUM 8.3* 8.0* 7.9*   ALBUMIN 2.4* 2.3* 2.3*   PROT 5.2* 4.9* 4.8*    138 142   K 3.5 3.2* 3.3*   CO2 26 26 25    105 107   BUN 20 24* 25*   CREATININE 0.8 0.9 0.7   ALKPHOS 338* 294* 305*   * 167* 188*   AST 73* 47* 82*     Tacrolimus Lvl   Date Value Ref Range Status   03/13/2018 10.5 5.0 - 15.0 ng/mL Final     Comment:     Testing performed by Liquid Chromatography-Tandem  Mass Spectrometry (LC-MS/MS).  This test was developed and its performance  characteristics  determined by [facility], Department of Pathology  and Laboratory Medicine in a manner consistent with CLIA  requirements. It has not been cleared or approved by the [place]  Food and Drug Administration.  This test is used for clinical   purposes.  It should not be regarded as investigational or for  research.     03/12/2018 9.9 5.0 - 15.0 ng/mL Final     Comment:     Testing performed by Liquid Chromatography-Tandem  Mass Spectrometry (LC-MS/MS).  This test was developed and its performance characteristics  determined by [facility], Department of Pathology  and Laboratory Medicine in a manner consistent with CLIA  requirements. It has not been cleared or approved by the [place]  Food and Drug Administration.  This test is used for clinical   purposes.  It should not be regarded as investigational or for  research.     03/11/2018 9.4 5.0 - 15.0 ng/mL Final     Comment:     Testing performed by Liquid Chromatography-Tandem  Mass Spectrometry (LC-MS/MS).  This test was developed and its performance characteristics  determined by [facility], Department of Pathology  and Laboratory Medicine in a manner consistent with CLIA  requirements. It has not been cleared or approved by the [place]  Food and Drug Administration.  This test is used for clinical   purposes.  It should not be regarded as investigational or for  research.     03/08/2018 6.8 5.0 - 15.0 ng/mL Final     Comment:     Testing performed by Liquid Chromatography-Tandem  Mass Spectrometry (LC-MS/MS).  This test was developed and its performance characteristics  determined by [facility], Department of Pathology  and Laboratory Medicine in a manner consistent with CLIA  requirements. It has not been cleared or approved by the [place]  Food and Drug Administration.  This test is used for clinical   purposes.  It should not be regarded as investigational or for  research.         Diagnostic  Results:  None    Assessment/Plan:     * Elevated liver enzymes    - admitted w elevated LFT's, pt recently treated w SM for mod ACR 2/27, 2/28, 3/1.  Pt readmitted w elevated LFT's, suspect persistent rejection.  Treated with solumedrol pulse, 3/9, 3/10 (2 doses) and 3/11.  - Liver biopsy 3/12/18- showed mild ACR.  Plan to place central line and start Thymo tonight.            Accelerated liver transplant rejection    - Liver bx 3/12, showing mild ACR.  Plan to place central line and start Thymo.  CD3 after third dose.            Liver transplant 12/26/2017 for ETOH    - s/p DBD OLTxp 12/26/17 for ETOH/PERKINS ESLD admitted w elevated LFTs.        Long-term use of immunosuppressant medication    - see prophylactic immunosuppression          Prophylactic immunotherapy    - continue prograf, steroids, and cellcept  - monitor prograf levels and adjust for therapeutic level          At risk for opportunistic infections    - weekly cmv pcr,  No valcyte due to leukopenia  - pentamidine for pcp            Anemia of chronic disease    - h/h stable, monitor labs daily        Edema    - greatest in abdomen.  Noted decreased UOP over past 24 hours.  Administered Lasix 40 mg at 1000, pt diuresed well.  Reviewed liver US from admit on 3/9 that did not show significant ascites.  Cont to monitor and dose Lasix on daily basis.            VTE Risk Mitigation         Ordered     heparin (porcine) injection 5,000 Units  Every 8 hours     Route:  Subcutaneous        03/09/18 1350     Medium Risk of VTE  Once      03/09/18 1350     Place sequential compression device  Until discontinued      03/09/18 1350          The patients clinical status was discussed at multidisplinary rounds, involving transplant surgery, transplant medicine, pharmacy, nursing, nutrition, and social work    Discharge Planning:  No plan for discharge, will need Thymo for tx of rejection    Renetta Mcconnell, NP  Liver Transplant  [facility]

## 2018-03-13 NOTE — SUBJECTIVE & OBJECTIVE
Subjective:     Chief Complaint/Reason for Admission:     History of Present Illness:  Ms. Marcie Bazan is a 52 y.o. 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 Post operative course complicated by hematoma evac on 18 and neutropenia requiring multiple doses of neupogen. Pt recently admitted with rejection. Liver biopsy was performed on showing mild ACR. She was treated with SM x 3 (, , and 3/1) with some improvement in LFTs seen. Of significance, liver pathology also showed moderate steatohepatitis. PETH from 2/15 negative.      Pt is being admitted as transfer from [facility] for acute volume overload. Pt reported gaining approx 30 lbs in 8 hours prior to presenting to the ED. She noticed swelling especially in her abdomen and legs with some SOB. She was given lasix at OSH prior to being transferred to [facility]. Upon arrival pt reports feeling much better, she still has some lower extremity edema as well as ascites, but reports it has significantly improved. She denies fever, nausea, vomiting, SOB, CP, abdominal pain, diarrhea. She has not taken any new medications nor eaten any new foods. , labs this morning significant for elevated LFTs and hypokalemia. CXR appears clear. Repeat labs, infectious lópez, echo, US liver, EKG and thyroid studies ordered.      PTA Medications   Medication Sig    aspirin (ECOTRIN) 81 MG EC tablet Take 1 tablet (81 mg total) by mouth once daily.    atorvastatin (LIPITOR) 40 MG tablet Take 1 tablet (40 mg total) by mouth once daily.    bisacodyl (DULCOLAX) 5 mg EC tablet Take 1-2 tablets (5-10 mg total) by mouth daily as needed for Constipation.    [] cyclobenzaprine (FLEXERIL) 5 MG tablet Take 1 tablet (5 mg total) by mouth 3 (three) times daily as needed for Muscle spasms.    famotidine (PEPCID) 20 MG tablet Take 1 tablet (20 mg total) by mouth every evening.    gabapentin (NEURONTIN) 300 MG capsule Take 1 capsule  (300 mg total) by mouth 2 (two) times daily.    HYDROmorphone (DILAUDID) 4 MG tablet Take 0.5-1 tablets (2-4 mg total) by mouth every 6 (six) hours as needed.    magnesium oxide (MAG-OX) 400 mg tablet Take 1 tablet (400 mg total) by mouth 2 (two) times daily.    mycophenolate (CELLCEPT) 250 mg Cap Take 4 capsules (1,000 mg total) by mouth 2 (two) times daily.    NIFEdipine (PROCARDIA-XL) 60 MG (OSM) 24 hr tablet Take 1 tablet (60 mg total) by mouth once daily.    nystatin (MYCOSTATIN) 100,000 unit/mL suspension Take 5 mLs (500,000 Units total) by mouth 3 (three) times daily after meals. STOP 3/21/18    ondansetron (ZOFRAN-ODT) 4 MG TbDL DISSOLVE 1 TABLET BY MOUTH EVERY 8 HOURS AS NEEDED FOR NAUSEA AND VOMITTING    predniSONE (DELTASONE) 10 MG tablet Take PO BID: 3/3 80 mg, 3/4 60 mg, 3/5 40 mg, 3/6 20 mg; PO QD: 3/7-3/13 20 mg, 3/14-3/20 15 mg, 3/21-3/27 10 mg, 3/28-4/3 5 mg; STOP 4/4    tacrolimus (PROGRAF) 1 MG Cap Take 5 capsules (5 mg total) by mouth every 12 (twelve) hours.    traZODone (DESYREL) 50 MG tablet Take 0.5 tablets (25 mg total) by mouth every evening.       Review of patient's allergies indicates:   Allergen Reactions    Ferrous sulfate Other (See Comments)     Patient states the pill makes her sick. She stated she would rather have a shot       Past Medical History:   Diagnosis Date    Alcohol abuse     Alcoholic hepatitis     Anemia of chronic disease     Coagulopathy     Encounter for blood transfusion     End stage liver disease     Hypertension     Hyponatremia     Liver cirrhosis     Liver transplant candidate     Obesity (BMI 30-39.9) 1/5/2016     Past Surgical History:   Procedure Laterality Date    BREAST BIOPSY      CHOLECYSTECTOMY      COLONOSCOPY N/A 12/1/2017    Procedure: COLONOSCOPY;  Surgeon: Filemon Fuentes MD;  Location: [address];  Service: Endoscopy;  Laterality: N/A;    COLONOSCOPY N/A 12/5/2017    Procedure: COLONOSCOPY;  Surgeon: José NICHOLSON  "MD Fan;  Location: [address]);  Service: Endoscopy;  Laterality: N/A;    HYSTERECTOMY      LIVER TRANSPLANT  12/2017     Family History     Problem Relation (Age of Onset)    Breast cancer Paternal Aunt    Depression Maternal Grandfather    Diabetes Father, Sister    Hypertension Mother, Father        Social History Main Topics    Smoking status: Never Smoker    Smokeless tobacco: Never Used    Alcohol use Yes      Comment: Currently admitted to inpatient rehab 7/2017    Drug use: No    Sexual activity: Not Currently        Review of Systems   Constitutional: Negative for appetite change, chills, fatigue and fever.   Respiratory: Negative for cough, shortness of breath, wheezing and stridor.    Cardiovascular: Negative for chest pain, palpitations and leg swelling.   Gastrointestinal: Positive for abdominal distention. Negative for abdominal pain, blood in stool, constipation, diarrhea, nausea and vomiting.   Genitourinary: Negative for dysuria, frequency, hematuria and urgency.   Skin: Negative.    Allergic/Immunologic: Positive for immunocompromised state.   Neurological: Negative for dizziness, weakness, numbness and headaches.   Hematological: Negative.    Psychiatric/Behavioral: Negative.      Objective:     Vital Signs (Most Recent):  Temp: 98.2 °F (36.8 °C) (03/13/18 1143)  Pulse: 83 (03/13/18 1143)  Resp: 16 (03/13/18 1143)  BP: (!) 152/90 (03/13/18 1143)  SpO2: 100 % (03/13/18 1143)  Height: 5' 3" (160 cm)  Weight: 64.7 kg (142 lb 10.2 oz)  Body mass index is 25.27 kg/m².     Physical Exam   Constitutional: She is oriented to person, place, and time. She appears well-developed. No distress.   HENT:   Head: Normocephalic and atraumatic.   Mouth/Throat: No oropharyngeal exudate.   Eyes: EOM are normal. Pupils are equal, round, and reactive to light. No scleral icterus.   Neck: Normal range of motion. Neck supple. No thyromegaly present.   Cardiovascular: Normal rate, regular rhythm, normal " heart sounds and intact distal pulses.  Exam reveals no gallop and no friction rub.    No murmur heard.  Pulmonary/Chest: Effort normal and breath sounds normal. No respiratory distress. She has no wheezes. She exhibits no tenderness.   Abdominal: Soft. Bowel sounds are normal. She exhibits distension. There is no tenderness.   Musculoskeletal: Normal range of motion. She exhibits edema (trace BLE).   Neurological: She is alert and oriented to person, place, and time.   Skin: Skin is warm and dry. Capillary refill takes 2 to 3 seconds. She is not diaphoretic.   Psychiatric: She has a normal mood and affect. Her behavior is normal. Judgment and thought content normal.   Nursing note and vitals reviewed.      Laboratory  CBC:     Recent Labs  Lab 03/11/18  0505 03/12/18  0504 03/13/18  0406   WBC 8.73 6.55 4.33   RBC 3.15* 3.06* 3.10*   HGB 9.0* 8.7* 8.8*   HCT 28.0* 27.0* 27.7*    238 210   MCV 89 88 89   MCH 28.6 28.4 28.4   MCHC 32.1 32.2 31.8*     CMP:     Recent Labs  Lab 03/11/18  0505 03/12/18  0504 03/13/18  0406   * 158* 97   CALCIUM 8.3* 8.0* 7.9*   ALBUMIN 2.4* 2.3* 2.3*   PROT 5.2* 4.9* 4.8*    138 142   K 3.5 3.2* 3.3*   CO2 26 26 25    105 107   BUN 20 24* 25*   CREATININE 0.8 0.9 0.7   ALKPHOS 338* 294* 305*   * 167* 188*   AST 73* 47* 82*     Tacrolimus Lvl   Date Value Ref Range Status   03/13/2018 10.5 5.0 - 15.0 ng/mL Final     Comment:     Testing performed by Liquid Chromatography-Tandem  Mass Spectrometry (LC-MS/MS).  This test was developed and its performance characteristics  determined by [facility], Department of Pathology  and Laboratory Medicine in a manner consistent with CLIA  requirements. It has not been cleared or approved by the [place]  Food and Drug Administration.  This test is used for clinical   purposes.  It should not be regarded as investigational or for  research.     03/12/2018 9.9 5.0 - 15.0 ng/mL Final     Comment:     Testing  performed by Liquid Chromatography-Tandem  Mass Spectrometry (LC-MS/MS).  This test was developed and its performance characteristics  determined by [facility], Department of Pathology  and Laboratory Medicine in a manner consistent with CLIA  requirements. It has not been cleared or approved by the [place]  Food and Drug Administration.  This test is used for clinical   purposes.  It should not be regarded as investigational or for  research.     03/11/2018 9.4 5.0 - 15.0 ng/mL Final     Comment:     Testing performed by Liquid Chromatography-Tandem  Mass Spectrometry (LC-MS/MS).  This test was developed and its performance characteristics  determined by [facility], Department of Pathology  and Laboratory Medicine in a manner consistent with CLIA  requirements. It has not been cleared or approved by the [place]  Food and Drug Administration.  This test is used for clinical   purposes.  It should not be regarded as investigational or for  research.     03/08/2018 6.8 5.0 - 15.0 ng/mL Final     Comment:     Testing performed by Liquid Chromatography-Tandem  Mass Spectrometry (LC-MS/MS).  This test was developed and its performance characteristics  determined by [facility], Department of Pathology  and Laboratory Medicine in a manner consistent with CLIA  requirements. It has not been cleared or approved by the [place]  Food and Drug Administration.  This test is used for clinical   purposes.  It should not be regarded as investigational or for  research.         Diagnostic Results:  None

## 2018-03-13 NOTE — PLAN OF CARE
Problem: Patient Care Overview  Goal: Plan of Care Review  Outcome: Ongoing (interventions implemented as appropriate)  AAOX4.  VSS.  No complaints of pain.  PIV is intact and free of infection.  Pt ambulating in room.  Accuchecks AC, HS, last BS was 119.  Pt had liver biopsy yesterday.  Bed is in lowest position, call bell is in reach, and pt instructed to call nurse when needing assistance.  Will continue to monitor.

## 2018-03-13 NOTE — ASSESSMENT & PLAN NOTE
- admitted w elevated LFT's, pt recently treated w SM for mod ACR 2/27, 2/28, 3/1.  Pt readmitted w elevated LFT's, suspect persistent rejection.  Treated with solumedrol pulse, 3/9, 3/10 (2 doses) and 3/11.  - Liver biopsy 3/12/18- showed mild ACR.  Plan to place central line and start Thymo tonight.

## 2018-03-13 NOTE — ASSESSMENT & PLAN NOTE
- greatest in abdomen.  Noted decreased UOP over past 24 hours.  Administered Lasix 40 mg at 1000, pt diuresed well.  Reviewed liver US from admit on 3/9 that did not show significant ascites.  Cont to monitor and dose Lasix on daily basis.

## 2018-03-13 NOTE — PLAN OF CARE
Pt aaox3.  Bed in low and locked position.  Nonskid socks in use.  Call bell, phone, food, drink withinreach in bed or on bedside table.  Aware to call if needing assistance.  Denies pain.  Accuchecks ac/hs.  Biopsy yest - results pending.  Mag dose increased to BID.  Lasix IVP with Kcl replacement ordered during md rounds.  See flowsheet for full assessment and details.

## 2018-03-13 NOTE — CONSULTS
" [facility]  Adult Nutrition  Progress Note    SUMMARY       Recommendations    Recommendation/Intervention:   -Continue regular diet and Boost order.   -Provided patient with healthy eating education - high fruits/vegetables, low saturated fat, low sodium, adequate protien.   -Pt reports appetite improving in past couple weeks but has been eating minimally and lost 40# since transplant. Encourage intake of healthy diet.   -RD following.     Goals: Consume/tolerate > 75% EEN and EPN  Nutrition Goal Status: new  Communication of RD Recs: reviewed with JOSE RAMON Jackson    Reason for Assessment    Reason for Assessment: consult  Diagnosis: transplant/postoperative complications (readmit OLTx 12/2017)  Relevant Medical History: cirrhosis 2' EtOH + PERKINS, ESRD  Interdisciplinary Rounds: did not attend  General Information Comments: pt now with steatohepatitis of transplanted liver, reported poor appetite and severe weight loss since transplant that has been improving in recent weeks  Nutrition Discharge Planning: Adequate PO nutrition    Nutrition Risk Screen    Nutrition Risk Screen: no indicators present    Nutrition/Diet History    Patient Reported Diet/Restrictions/Preferences: low salt  Food Preferences: All cultural and Moravian food preferences addressed as appropriate  Do you have any cultural, spiritual, Moravian conflicts, given your current situation?: none  Factors Affecting Nutritional Intake: decreased appetite    Anthropometrics    Temp: 98.2 °F (36.8 °C)  Height Method: Stated  Height: 5' 2.99" (160 cm)  Height (inches): 62.99 in  Weight Method: Standard Scale  Weight:  (~10-20# of fluid shifts)  Weight (lb): 142.64 lb  Ideal Body Weight (IBW), Female: 114.95 lb  % Ideal Body Weight, Female (lb): 121.16 lb  BMI (Calculated): 24.7  BMI Grade: 18.5-24.9 - normal    Anthropometrics Special Consideration         Lab/Procedures/Meds    Pertinent Labs Reviewed: reviewed  Pertinent Labs Comments: TG " "235, chol 271, , AST 82, , Alk Phos 305, P 2.6, Mg 1.2  Pertinent Medications Reviewed: reviewed  Pertinent Medications Comments: statin, lasix, heparin, tacrolimus, KCl     Physical Findings/Assessment    Overall Physical Appearance: nourished, edematous  Oral/Mouth Cavity: WDL  Skin: intact    Estimated/Assessed Needs    Weight Used For Calorie Calculations: 64.7 kg (142 lb 10.2 oz)  Height: 5' 2.99" (160 cm)  Energy Calorie Requirements (kcal): 1520  Energy Need Method: [place]-St Jeor  20 kcal/kg (kcal): 1294  25 kcal/kg (kcal): 1617.5  30 kcal/kg (kcal): 1941  35 kcal/kg (kcal): 2264.5  40 kcal/kg (kcal): 2588  45 kcal/kg (kcal): 2911.5  50 kcal/kg (kcal): 3235  RMR ([place]-St. Jeor Equation): 1216  Protein Requirements: 78-91g (1.2-1.4 g/kg)  Weight Used For Protein Calculations: 64.7 kg (142 lb 10.2 oz)  0.6 gm Protein (gm): 38.9  0.7 gm Protein (gm): 45.38  0.8 gm Protein (gm): 51.87  0.9 gm Protein (gm): 58.35  1.0 gm Protein (gm): 64.84  1.1 gm Protein (gm): 71.32  1.2 gm Protein (gm): 77.8  1.3 gm Protein (gm): 84.29  1.4 gm Protein (gm): 90.77  1.5 gm Protein (gm): 97.25  1.6 gm Protein (gm): 103.74  1.7 gm Protein (gm): 110.22  1.8 gm Protein (gm): 116.7  1.9 gm Protein (gm): 123.19  2.0 gm Protein (gm): 129.67  2.1 gm Protein (gm): 136.15  2.2 gm Protein (gm): 142.64  2.3 gm Protein (gm): 149.12  2.4 gm Protein (gm): 155.6  2.5 gm Protein (gm): 162.09  Fluid Need Method: RDA Method  RDA Method (mL): 1520       Nutrition Prescription Ordered    Current Diet Order: Regular  Oral Nutrition Supplement: Boost Breeze, Boost Glucose Control    Evaluation of Received Nutrient/Fluid Intake       % Intake of Estimated Energy Needs: 50 - 75 %  % Meal Intake: 50 - 75 %    Nutrition Risk          Assessment and Plan    No new Assessment & Plan notes have been filed under this hospital service since the last note was generated.  Service: Nutrition       Monitor and Evaluation    Food and Nutrient " Intake: energy intake, food and beverage intake  Food and Nutrient Adminstration: diet order  Knowledge/Beliefs/Attitudes: food and nutrition knowledge/skill  Physical Activity and Function: nutrition-related ADLs and IADLs  Anthropometric Measurements: weight, weight change, body mass index  Biochemical Data, Medical Tests and Procedures: electrolyte and renal panel, gastrointestinal profile, glucose/endocrine profile, inflammatory profile, lipid profile  Nutrition-Focused Physical Findings: overall appearance     Nutrition Follow-Up

## 2018-03-14 LAB
ALBUMIN SERPL BCP-MCNC: 2.5 G/DL
ALP SERPL-CCNC: 309 U/L
ALT SERPL W/O P-5'-P-CCNC: 186 U/L
ANION GAP SERPL CALC-SCNC: 6 MMOL/L
AST SERPL-CCNC: 61 U/L
BACTERIA BLD CULT: NORMAL
BASOPHILS # BLD AUTO: 0 K/UL
BASOPHILS NFR BLD: 0 %
BILIRUB SERPL-MCNC: 0.7 MG/DL
BUN SERPL-MCNC: 21 MG/DL
CALCIUM SERPL-MCNC: 8.3 MG/DL
CHLORIDE SERPL-SCNC: 107 MMOL/L
CO2 SERPL-SCNC: 26 MMOL/L
CREAT SERPL-MCNC: 0.7 MG/DL
DIFFERENTIAL METHOD: ABNORMAL
EOSINOPHIL # BLD AUTO: 0 K/UL
EOSINOPHIL NFR BLD: 0 %
ERYTHROCYTE [DISTWIDTH] IN BLOOD BY AUTOMATED COUNT: 20.6 %
EST. GFR  (AFRICAN AMERICAN): >60 ML/MIN/1.73 M^2
EST. GFR  (NON AFRICAN AMERICAN): >60 ML/MIN/1.73 M^2
GLUCOSE SERPL-MCNC: 177 MG/DL
HCT VFR BLD AUTO: 27.5 %
HGB BLD-MCNC: 8.8 G/DL
IMM GRANULOCYTES # BLD AUTO: 0.03 K/UL
IMM GRANULOCYTES NFR BLD AUTO: 0.5 %
LYMPHOCYTES # BLD AUTO: 0 K/UL
LYMPHOCYTES NFR BLD: 0.7 %
MAGNESIUM SERPL-MCNC: 1.2 MG/DL
MCH RBC QN AUTO: 29 PG
MCHC RBC AUTO-ENTMCNC: 32 G/DL
MCV RBC AUTO: 91 FL
MONOCYTES # BLD AUTO: 0.2 K/UL
MONOCYTES NFR BLD: 2.7 %
NEUTROPHILS # BLD AUTO: 5.8 K/UL
NEUTROPHILS NFR BLD: 96.1 %
NRBC BLD-RTO: 0 /100 WBC
PHOSPHATE SERPL-MCNC: 2.8 MG/DL
PLATELET # BLD AUTO: 198 K/UL
PMV BLD AUTO: 11.2 FL
POCT GLUCOSE: 111 MG/DL (ref 70–110)
POCT GLUCOSE: 124 MG/DL (ref 70–110)
POTASSIUM SERPL-SCNC: 4.6 MMOL/L
PROT SERPL-MCNC: 5.1 G/DL
RBC # BLD AUTO: 3.03 M/UL
SODIUM SERPL-SCNC: 139 MMOL/L
TACROLIMUS BLD-MCNC: 15.3 NG/ML
WBC # BLD AUTO: 6 K/UL

## 2018-03-14 PROCEDURE — 63600175 PHARM REV CODE 636 W HCPCS: Performed by: PHYSICIAN ASSISTANT

## 2018-03-14 PROCEDURE — 25000003 PHARM REV CODE 250: Performed by: PHYSICIAN ASSISTANT

## 2018-03-14 PROCEDURE — 80053 COMPREHEN METABOLIC PANEL: CPT

## 2018-03-14 PROCEDURE — 25000003 PHARM REV CODE 250: Performed by: NURSE PRACTITIONER

## 2018-03-14 PROCEDURE — 20600001 HC STEP DOWN PRIVATE ROOM

## 2018-03-14 PROCEDURE — 99233 SBSQ HOSP IP/OBS HIGH 50: CPT | Mod: 24,,, | Performed by: NURSE PRACTITIONER

## 2018-03-14 PROCEDURE — 83735 ASSAY OF MAGNESIUM: CPT

## 2018-03-14 PROCEDURE — 84100 ASSAY OF PHOSPHORUS: CPT

## 2018-03-14 PROCEDURE — 63600175 PHARM REV CODE 636 W HCPCS: Performed by: NURSE PRACTITIONER

## 2018-03-14 PROCEDURE — 63600175 PHARM REV CODE 636 W HCPCS: Performed by: PEDIATRICS

## 2018-03-14 PROCEDURE — 94761 N-INVAS EAR/PLS OXIMETRY MLT: CPT

## 2018-03-14 PROCEDURE — 85025 COMPLETE CBC W/AUTO DIFF WBC: CPT

## 2018-03-14 PROCEDURE — 80197 ASSAY OF TACROLIMUS: CPT

## 2018-03-14 RX ORDER — TACROLIMUS 1 MG/1
5 CAPSULE ORAL 2 TIMES DAILY
Status: DISCONTINUED | OUTPATIENT
Start: 2018-03-15 | End: 2018-03-15

## 2018-03-14 RX ORDER — DIPHENHYDRAMINE HCL 25 MG
25 CAPSULE ORAL ONCE
Status: COMPLETED | OUTPATIENT
Start: 2018-03-14 | End: 2018-03-14

## 2018-03-14 RX ORDER — ACETAMINOPHEN 325 MG/1
650 TABLET ORAL ONCE
Status: COMPLETED | OUTPATIENT
Start: 2018-03-14 | End: 2018-03-14

## 2018-03-14 RX ADMIN — NYSTATIN 500000 UNITS: 500000 SUSPENSION ORAL at 06:03

## 2018-03-14 RX ADMIN — DIPHENHYDRAMINE HYDROCHLORIDE 25 MG: 25 CAPSULE ORAL at 04:03

## 2018-03-14 RX ADMIN — ACETAMINOPHEN 650 MG: 325 TABLET ORAL at 04:03

## 2018-03-14 RX ADMIN — MAGNESIUM OXIDE TAB 400 MG (241.3 MG ELEMENTAL MG) 800 MG: 400 (241.3 MG) TAB at 08:03

## 2018-03-14 RX ADMIN — GABAPENTIN 300 MG: 300 CAPSULE ORAL at 08:03

## 2018-03-14 RX ADMIN — TACROLIMUS 6 MG: 1 CAPSULE ORAL at 07:03

## 2018-03-14 RX ADMIN — FAMOTIDINE 20 MG: 20 TABLET, FILM COATED ORAL at 08:03

## 2018-03-14 RX ADMIN — ASPIRIN 81 MG: 81 TABLET, COATED ORAL at 07:03

## 2018-03-14 RX ADMIN — NIFEDIPINE 60 MG: 60 TABLET, FILM COATED, EXTENDED RELEASE ORAL at 07:03

## 2018-03-14 RX ADMIN — NYSTATIN 500000 UNITS: 500000 SUSPENSION ORAL at 07:03

## 2018-03-14 RX ADMIN — TRAZODONE HYDROCHLORIDE 25 MG: 50 TABLET ORAL at 08:03

## 2018-03-14 RX ADMIN — MAGNESIUM OXIDE TAB 400 MG (241.3 MG ELEMENTAL MG) 800 MG: 400 (241.3 MG) TAB at 07:03

## 2018-03-14 RX ADMIN — ANTI-THYMOCYTE GLOBULIN (RABBIT) 100 MG: 5 INJECTION, POWDER, LYOPHILIZED, FOR SOLUTION INTRAVENOUS at 04:03

## 2018-03-14 RX ADMIN — NYSTATIN 500000 UNITS: 500000 SUSPENSION ORAL at 01:03

## 2018-03-14 RX ADMIN — MYCOPHENOLATE MOFETIL 1000 MG: 250 CAPSULE ORAL at 08:03

## 2018-03-14 RX ADMIN — METHYLPREDNISOLONE SODIUM SUCCINATE: 1 INJECTION, POWDER, LYOPHILIZED, FOR SOLUTION INTRAMUSCULAR; INTRAVENOUS at 03:03

## 2018-03-14 RX ADMIN — ATORVASTATIN CALCIUM 40 MG: 20 TABLET, FILM COATED ORAL at 07:03

## 2018-03-14 RX ADMIN — GABAPENTIN 300 MG: 300 CAPSULE ORAL at 07:03

## 2018-03-14 RX ADMIN — MYCOPHENOLATE MOFETIL 1000 MG: 250 CAPSULE ORAL at 07:03

## 2018-03-14 RX ADMIN — TRAMADOL HYDROCHLORIDE 50 MG: 50 TABLET, COATED ORAL at 11:03

## 2018-03-14 NOTE — SUBJECTIVE & OBJECTIVE
Scheduled Meds:   acetaminophen  650 mg Oral Once    anti-thymo immune glob (THYMOGLOBULIN -rabbit) IVPB  1.5 mg/kg Intravenous Once    aspirin  81 mg Oral Daily    atorvastatin  40 mg Oral Daily    diphenhydrAMINE  25 mg Oral Once    famotidine  20 mg Oral QHS    gabapentin  300 mg Oral BID    heparin (porcine)  5,000 Units Subcutaneous Q8H    magnesium oxide  800 mg Oral BID    methylPREDNISolone (SOLU-Medrol) IVPB (doses > 250 mg)   Intravenous Once    mycophenolate  1,000 mg Oral BID    NIFEdipine  60 mg Oral Daily    nystatin  500,000 Units Oral TID PC    [START ON 3/15/2018] tacrolimus  5 mg Oral BID    traZODone  25 mg Oral QHS     Continuous Infusions:  PRN Meds:acetaminophen, bisacodyl, cyclobenzaprine, dextrose 50%, dextrose 50%, diphenhydrAMINE, glucagon (human recombinant), glucose, glucose, insulin aspart U-100, ondansetron, sodium chloride 0.9%, traMADol    Review of Systems   Constitutional: Negative for activity change, appetite change, chills, fatigue and fever.   HENT: Negative for congestion and facial swelling.    Eyes: Negative for pain, discharge and visual disturbance.   Respiratory: Negative for cough, chest tightness, shortness of breath and wheezing.    Cardiovascular: Negative for chest pain, palpitations and leg swelling.   Gastrointestinal: Positive for abdominal distention. Negative for abdominal pain, blood in stool, constipation, diarrhea, nausea and vomiting.   Endocrine: Negative.    Genitourinary: Negative for dysuria, frequency, hematuria and urgency.   Musculoskeletal: Negative for back pain.   Skin: Negative.  Negative for pallor and wound.   Allergic/Immunologic: Positive for immunocompromised state.   Neurological: Negative for dizziness, weakness, numbness and headaches.   Hematological: Negative.    Psychiatric/Behavioral: Negative.      Objective:     Vital Signs (Most Recent):  Temp: 97.9 °F (36.6 °C) (03/14/18 1200)  Pulse: 83 (03/14/18 1200)  Resp: 18  (03/14/18 1200)  BP: (!) 151/78 (03/14/18 1200)  SpO2: 100 % (03/14/18 1200) Vital Signs (24h Range):  Temp:  [97.8 °F (36.6 °C)-99 °F (37.2 °C)] 97.9 °F (36.6 °C)  Pulse:  [82-96] 83  Resp:  [15-20] 18  SpO2:  [97 %-100 %] 100 %  BP: (127-164)/(65-89) 151/78     Weight: 64.5 kg (142 lb 3.2 oz)  Body mass index is 25.2 kg/m².    Intake/Output - Last 3 Shifts       03/12 0700 - 03/13 0659 03/13 0700 - 03/14 0659 03/14 0700 - 03/15 0659    P.O. 960 1560     IV Piggyback  200     Total Intake(mL/kg) 960 (14.8) 1760 (27.3)     Urine (mL/kg/hr) 625 (0.4) 2700 (1.7)     Stool 0 (0) 0 (0)     Total Output 625 2700      Net +335 -940             Urine Occurrence 0 x 0 x     Stool Occurrence 2 x 10 x 0 x          Physical Exam   Constitutional: She is oriented to person, place, and time. She appears well-developed. No distress.   HENT:   Head: Normocephalic and atraumatic.   Mouth/Throat: No oropharyngeal exudate.   Eyes: EOM are normal. Pupils are equal, round, and reactive to light. No scleral icterus.   Neck: Normal range of motion. Neck supple. No thyromegaly present.   Cardiovascular: Normal rate, regular rhythm, normal heart sounds and intact distal pulses.  Exam reveals no gallop and no friction rub.    No murmur heard.  Pulmonary/Chest: Effort normal and breath sounds normal. No respiratory distress. She has no wheezes. She exhibits no tenderness.   Abdominal: Soft. Bowel sounds are normal. She exhibits distension. There is no tenderness.   Musculoskeletal: Normal range of motion. She exhibits edema (trace BLE).   Neurological: She is alert and oriented to person, place, and time.   Skin: Skin is warm and dry. Capillary refill takes 2 to 3 seconds. She is not diaphoretic.   Psychiatric: She has a normal mood and affect. Her behavior is normal. Judgment and thought content normal.   Nursing note and vitals reviewed.      Laboratory:  Immunosuppressants         Stop Route Frequency     tacrolimus capsule 5 mg      --  Oral 2 times daily     mycophenolate capsule 1,000 mg      -- Oral 2 times daily        CBC:   Recent Labs  Lab 03/14/18  0530   WBC 6.00   RBC 3.03*   HGB 8.8*   HCT 27.5*      MCV 91   MCH 29.0   MCHC 32.0     CMP:   Recent Labs  Lab 03/14/18  0530   *   CALCIUM 8.3*   ALBUMIN 2.5*   PROT 5.1*      K 4.6   CO2 26      BUN 21*   CREATININE 0.7   ALKPHOS 309*   *   AST 61*   BILITOT 0.7     Labs within the past 24 hours have been reviewed.    Diagnostic Results:  I have personally reviewed all pertinent imaging studies.

## 2018-03-14 NOTE — ASSESSMENT & PLAN NOTE
- greatest in abdomen.  Noted decreased UOP over past 24 hours.  Administered Lasix 40 mg at 1000 on 3/14, pt diuresed well, 2700 ml/24 hrs.  - Reviewed liver US from admit on 3/9 that did not show significant ascites.  Cont to monitor and dose Lasix on daily basis.

## 2018-03-14 NOTE — PLAN OF CARE
Problem: Patient Care Overview  Goal: Plan of Care Review  Outcome: Ongoing (interventions implemented as appropriate)  Pt received thymo #2 today. Plan for patient to have 5 doses. Pt ambulated off unit during shift to get food for dinner. Pt free from falls and injury throughout shift. BG monitored AC/HS with No SSI needed. Pt LFTs trending downward. No complaints of pain except soreness at central line site.

## 2018-03-14 NOTE — ASSESSMENT & PLAN NOTE
- admitted w elevated LFT's, pt recently treated w SM for mod ACR 2/27, 2/28, 3/1.  Pt readmitted w elevated LFT's, suspect persistent rejection.  Treated with solumedrol pulse, 3/9, 3/10 (2 doses) and 3/11.  - Liver biopsy 3/12/18- showed mild ACR.    - Central line placed for Thymo x 5 doses.  - Thymo dose #1 - 3/13/18    Thymo dose #2 - 3/14/18  - Check CD3 count on Friday 3/16/18.

## 2018-03-14 NOTE — ASSESSMENT & PLAN NOTE
- continue prograf, steroids, and cellcept. Monitor labs daily and adjust dose accordingly for a therapeutic level.

## 2018-03-14 NOTE — ASSESSMENT & PLAN NOTE
- Liver bx 3/12, showing mild ACR.    - Thymo started 3/14, dose #2 today 3/14.     - CD3 after third dose, 3/16/18 ordered.

## 2018-03-14 NOTE — PROGRESS NOTES
[facility]  Liver Transplant  Progress Note    Patient Name: Marcie Bazan  MRN: [id number]  Admission Date: 3/9/2018  Hospital Length of Stay: 5 days  Code Status: Full Code  Primary Care Provider: ODILIA Wall  Post-Operative Day: 78    ORGAN:   LIVER  Disease Etiology: Alcoholic Cirrhosis  Donor Type:    - Brain Death  CDC High Risk:   No  Donor CMV Status:   Donor CMV Status: Negative  Donor HBcAB:   Negative  Donor HCV Status:   Negative  Whole or Partial: Whole Liver  Biliary Anastomosis: End to End  Arterial Anatomy: Standard  Subjective:     History of Present Illness:  Ms. Marcie Bazan is a 52 y.o. 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 Post operative course complicated by hematoma evac on 18 and neutropenia requiring multiple doses of neupogen. Pt recently admitted with rejection. Liver biopsy was performed on showing mild ACR. She was treated with SM x 3 (, , and 3/1) with some improvement in LFTs seen. Of significance, liver pathology also showed moderate steatohepatitis. PETH from 2/15 negative.      Pt is being admitted as transfer from [facility] for acute volume overload. Pt reported gaining approx 30 lbs in 8 hours prior to presenting to the ED. She noticed swelling especially in her abdomen and legs with some SOB. She was given lasix at OSH prior to being transferred to [facility]. Upon arrival pt reports feeling much better, she still has some lower extremity edema as well as ascites, but reports it has significantly improved. She denies fever, nausea, vomiting, SOB, CP, abdominal pain, diarrhea. She has not taken any new medications nor eaten any new foods. , labs this morning significant for elevated LFTs and hypokalemia. CXR appears clear. Repeat labs, infectious lópez, echo, US liver, EKG and thyroid studies ordered.    Hospital Course:  Interval history: no acute events over night. Pt  diuresed well with lasix 40 mg yesterday. UOP 2700 ml in past 24 hours. LFTs improving today. She tolerated Thymo #1 yesterday. Plan for Thymo #2 today. Monitor.     Scheduled Meds:   acetaminophen  650 mg Oral Once    anti-thymo immune glob (THYMOGLOBULIN -rabbit) IVPB  1.5 mg/kg Intravenous Once    aspirin  81 mg Oral Daily    atorvastatin  40 mg Oral Daily    diphenhydrAMINE  25 mg Oral Once    famotidine  20 mg Oral QHS    gabapentin  300 mg Oral BID    heparin (porcine)  5,000 Units Subcutaneous Q8H    magnesium oxide  800 mg Oral BID    methylPREDNISolone (SOLU-Medrol) IVPB (doses > 250 mg)   Intravenous Once    mycophenolate  1,000 mg Oral BID    NIFEdipine  60 mg Oral Daily    nystatin  500,000 Units Oral TID PC    [START ON 3/15/2018] tacrolimus  5 mg Oral BID    traZODone  25 mg Oral QHS     Continuous Infusions:  PRN Meds:acetaminophen, bisacodyl, cyclobenzaprine, dextrose 50%, dextrose 50%, diphenhydrAMINE, glucagon (human recombinant), glucose, glucose, insulin aspart U-100, ondansetron, sodium chloride 0.9%, traMADol    Review of Systems   Constitutional: Negative for activity change, appetite change, chills, fatigue and fever.   HENT: Negative for congestion and facial swelling.    Eyes: Negative for pain, discharge and visual disturbance.   Respiratory: Negative for cough, chest tightness, shortness of breath and wheezing.    Cardiovascular: Negative for chest pain, palpitations and leg swelling.   Gastrointestinal: Positive for abdominal distention. Negative for abdominal pain, blood in stool, constipation, diarrhea, nausea and vomiting.   Endocrine: Negative.    Genitourinary: Negative for dysuria, frequency, hematuria and urgency.   Musculoskeletal: Negative for back pain.   Skin: Negative.  Negative for pallor and wound.   Allergic/Immunologic: Positive for immunocompromised state.   Neurological: Negative for dizziness, weakness, numbness and headaches.   Hematological: Negative.     Psychiatric/Behavioral: Negative.      Objective:     Vital Signs (Most Recent):  Temp: 97.9 °F (36.6 °C) (03/14/18 1200)  Pulse: 83 (03/14/18 1200)  Resp: 18 (03/14/18 1200)  BP: (!) 151/78 (03/14/18 1200)  SpO2: 100 % (03/14/18 1200) Vital Signs (24h Range):  Temp:  [97.8 °F (36.6 °C)-99 °F (37.2 °C)] 97.9 °F (36.6 °C)  Pulse:  [82-96] 83  Resp:  [15-20] 18  SpO2:  [97 %-100 %] 100 %  BP: (127-164)/(65-89) 151/78     Weight: 64.5 kg (142 lb 3.2 oz)  Body mass index is 25.2 kg/m².    Intake/Output - Last 3 Shifts       03/12 0700 - 03/13 0659 03/13 0700 - 03/14 0659 03/14 0700 - 03/15 0659    P.O. 960 1560     IV Piggyback  200     Total Intake(mL/kg) 960 (14.8) 1760 (27.3)     Urine (mL/kg/hr) 625 (0.4) 2700 (1.7)     Stool 0 (0) 0 (0)     Total Output 625 2700      Net +335 -940             Urine Occurrence 0 x 0 x     Stool Occurrence 2 x 10 x 0 x          Physical Exam   Constitutional: She is oriented to person, place, and time. She appears well-developed. No distress.   HENT:   Head: Normocephalic and atraumatic.   Mouth/Throat: No oropharyngeal exudate.   Eyes: EOM are normal. Pupils are equal, round, and reactive to light. No scleral icterus.   Neck: Normal range of motion. Neck supple. No thyromegaly present.   Cardiovascular: Normal rate, regular rhythm, normal heart sounds and intact distal pulses.  Exam reveals no gallop and no friction rub.    No murmur heard.  Pulmonary/Chest: Effort normal and breath sounds normal. No respiratory distress. She has no wheezes. She exhibits no tenderness.   Abdominal: Soft. Bowel sounds are normal. She exhibits distension. There is no tenderness.   Musculoskeletal: Normal range of motion. She exhibits edema (trace BLE).   Neurological: She is alert and oriented to person, place, and time.   Skin: Skin is warm and dry. Capillary refill takes 2 to 3 seconds. She is not diaphoretic.   Psychiatric: She has a normal mood and affect. Her behavior is normal. Judgment and  thought content normal.   Nursing note and vitals reviewed.      Laboratory:  Immunosuppressants         Stop Route Frequency     tacrolimus capsule 5 mg      -- Oral 2 times daily     mycophenolate capsule 1,000 mg      -- Oral 2 times daily        CBC:   Recent Labs  Lab 03/14/18  0530   WBC 6.00   RBC 3.03*   HGB 8.8*   HCT 27.5*      MCV 91   MCH 29.0   MCHC 32.0     CMP:   Recent Labs  Lab 03/14/18  0530   *   CALCIUM 8.3*   ALBUMIN 2.5*   PROT 5.1*      K 4.6   CO2 26      BUN 21*   CREATININE 0.7   ALKPHOS 309*   *   AST 61*   BILITOT 0.7     Labs within the past 24 hours have been reviewed.    Diagnostic Results:  I have personally reviewed all pertinent imaging studies.    Assessment/Plan:     * Elevated liver enzymes    - admitted w elevated LFT's, pt recently treated w SM for mod ACR 2/27, 2/28, 3/1.  Pt readmitted w elevated LFT's, suspect persistent rejection.  Treated with solumedrol pulse, 3/9, 3/10 (2 doses) and 3/11.  - Liver biopsy 3/12/18- showed mild ACR.    - Central line placed for Thymo x 5 doses.  - Thymo dose #1 - 3/13/18    Thymo dose #2 - 3/14/18  - Check CD3 count on Friday 3/16/18.            Accelerated liver transplant rejection    - Liver bx 3/12, showing mild ACR.    - Thymo started 3/14, dose #2 today 3/14.     - CD3 after third dose, 3/16/18 ordered.            Liver transplant 12/26/2017 for ETOH    - s/p DBD OLTxp 12/26/17 for ETOH/PERKINS ESLD admitted w elevated LFTs.        Long-term use of immunosuppressant medication    - see prophylactic immunosuppression.        Prophylactic immunotherapy    - continue prograf, steroids, and cellcept. Monitor labs daily and adjust dose accordingly for a therapeutic level.           At risk for opportunistic infections    - weekly cmv pcr,  No valcyte due to leukopenia.  - pentamidine for PCP prophylaxis.             Anemia of chronic disease    - H&H stable, monitor labs daily.        Edema    - greatest in  abdomen.  Noted decreased UOP over past 24 hours.  Administered Lasix 40 mg at 1000 on 3/14, pt diuresed well, 2700 ml/24 hrs.  - Reviewed liver US from admit on 3/9 that did not show significant ascites.  Cont to monitor and dose Lasix on daily basis.            VTE Risk Mitigation         Ordered     heparin (porcine) injection 5,000 Units  Every 8 hours     Route:  Subcutaneous        03/09/18 1350     Medium Risk of VTE  Once      03/09/18 1350     Place sequential compression device  Until discontinued      03/09/18 1350          The patients clinical status was discussed at multidisplinary rounds, involving transplant surgery, transplant medicine, pharmacy, nursing, nutrition, and social work    Discharge Planning: not a candidate for dc at this time.      Radha Arellano, JOSE RAMON  Liver Transplant  [facility]

## 2018-03-15 ENCOUNTER — CONFERENCE (OUTPATIENT)
Dept: TRANSPLANT | Facility: CLINIC | Age: 53
End: 2018-03-15

## 2018-03-15 LAB
ALBUMIN SERPL BCP-MCNC: 2.4 G/DL
ALP SERPL-CCNC: 288 U/L
ALT SERPL W/O P-5'-P-CCNC: 136 U/L
ANION GAP SERPL CALC-SCNC: 7 MMOL/L
AST SERPL-CCNC: 32 U/L
BASOPHILS # BLD AUTO: 0 K/UL
BASOPHILS NFR BLD: 0 %
BILIRUB SERPL-MCNC: 0.6 MG/DL
BUN SERPL-MCNC: 20 MG/DL
CALCIUM SERPL-MCNC: 8.2 MG/DL
CHLORIDE SERPL-SCNC: 106 MMOL/L
CO2 SERPL-SCNC: 25 MMOL/L
CREAT SERPL-MCNC: 0.6 MG/DL
DIFFERENTIAL METHOD: ABNORMAL
EOSINOPHIL # BLD AUTO: 0 K/UL
EOSINOPHIL NFR BLD: 0 %
ERYTHROCYTE [DISTWIDTH] IN BLOOD BY AUTOMATED COUNT: 20.5 %
EST. GFR  (AFRICAN AMERICAN): >60 ML/MIN/1.73 M^2
EST. GFR  (NON AFRICAN AMERICAN): >60 ML/MIN/1.73 M^2
GLUCOSE SERPL-MCNC: 135 MG/DL
HCT VFR BLD AUTO: 27.7 %
HGB BLD-MCNC: 8.9 G/DL
IMM GRANULOCYTES # BLD AUTO: 0.06 K/UL
IMM GRANULOCYTES NFR BLD AUTO: 0.7 %
LYMPHOCYTES # BLD AUTO: 0.1 K/UL
LYMPHOCYTES NFR BLD: 1.3 %
MAGNESIUM SERPL-MCNC: 1.3 MG/DL
MCH RBC QN AUTO: 29.2 PG
MCHC RBC AUTO-ENTMCNC: 32.1 G/DL
MCV RBC AUTO: 91 FL
MONOCYTES # BLD AUTO: 0.7 K/UL
MONOCYTES NFR BLD: 8.6 %
NEUTROPHILS # BLD AUTO: 7.7 K/UL
NEUTROPHILS NFR BLD: 89.4 %
NRBC BLD-RTO: 0 /100 WBC
PHOSPHATE SERPL-MCNC: 2.7 MG/DL
PLATELET # BLD AUTO: 197 K/UL
PMV BLD AUTO: 11.1 FL
POCT GLUCOSE: 103 MG/DL (ref 70–110)
POCT GLUCOSE: 110 MG/DL (ref 70–110)
POCT GLUCOSE: 123 MG/DL (ref 70–110)
POCT GLUCOSE: 156 MG/DL (ref 70–110)
POCT GLUCOSE: 220 MG/DL (ref 70–110)
POCT GLUCOSE: 223 MG/DL (ref 70–110)
POTASSIUM SERPL-SCNC: 3.7 MMOL/L
PROT SERPL-MCNC: 5.3 G/DL
RBC # BLD AUTO: 3.05 M/UL
SODIUM SERPL-SCNC: 138 MMOL/L
TACROLIMUS BLD-MCNC: 5.8 NG/ML
WBC # BLD AUTO: 8.64 K/UL

## 2018-03-15 PROCEDURE — 25000003 PHARM REV CODE 250: Performed by: PHYSICIAN ASSISTANT

## 2018-03-15 PROCEDURE — 25000003 PHARM REV CODE 250: Performed by: TRANSPLANT SURGERY

## 2018-03-15 PROCEDURE — 84100 ASSAY OF PHOSPHORUS: CPT

## 2018-03-15 PROCEDURE — 83735 ASSAY OF MAGNESIUM: CPT

## 2018-03-15 PROCEDURE — 99233 SBSQ HOSP IP/OBS HIGH 50: CPT | Mod: 24,,, | Performed by: PHYSICIAN ASSISTANT

## 2018-03-15 PROCEDURE — 25000003 PHARM REV CODE 250: Performed by: NURSE PRACTITIONER

## 2018-03-15 PROCEDURE — 80053 COMPREHEN METABOLIC PANEL: CPT

## 2018-03-15 PROCEDURE — 85025 COMPLETE CBC W/AUTO DIFF WBC: CPT

## 2018-03-15 PROCEDURE — 63600175 PHARM REV CODE 636 W HCPCS: Performed by: PHYSICIAN ASSISTANT

## 2018-03-15 PROCEDURE — 20600001 HC STEP DOWN PRIVATE ROOM

## 2018-03-15 PROCEDURE — 80197 ASSAY OF TACROLIMUS: CPT

## 2018-03-15 PROCEDURE — 63600175 PHARM REV CODE 636 W HCPCS: Performed by: NURSE PRACTITIONER

## 2018-03-15 RX ORDER — LORAZEPAM/0.9% SODIUM CHLORIDE 100MG/0.1L
2 PLASTIC BAG, INJECTION (ML) INTRAVENOUS ONCE
Status: DISCONTINUED | OUTPATIENT
Start: 2018-03-15 | End: 2018-03-15

## 2018-03-15 RX ORDER — ACETAMINOPHEN 325 MG/1
650 TABLET ORAL ONCE
Status: COMPLETED | OUTPATIENT
Start: 2018-03-15 | End: 2018-03-15

## 2018-03-15 RX ORDER — DIPHENHYDRAMINE HCL 25 MG
25 CAPSULE ORAL ONCE
Status: COMPLETED | OUTPATIENT
Start: 2018-03-15 | End: 2018-03-15

## 2018-03-15 RX ORDER — MAGNESIUM SULFATE/D5W 2 G/50 ML
2 INTRAVENOUS SOLUTION, PIGGYBACK (ML) INTRAVENOUS ONCE
Status: COMPLETED | OUTPATIENT
Start: 2018-03-15 | End: 2018-03-15

## 2018-03-15 RX ORDER — TACROLIMUS 1 MG/1
6 CAPSULE ORAL 2 TIMES DAILY
Status: DISCONTINUED | OUTPATIENT
Start: 2018-03-15 | End: 2018-03-17 | Stop reason: HOSPADM

## 2018-03-15 RX ADMIN — FAMOTIDINE 20 MG: 20 TABLET, FILM COATED ORAL at 08:03

## 2018-03-15 RX ADMIN — NYSTATIN 500000 UNITS: 500000 SUSPENSION ORAL at 08:03

## 2018-03-15 RX ADMIN — ACETAMINOPHEN 650 MG: 325 TABLET ORAL at 12:03

## 2018-03-15 RX ADMIN — TACROLIMUS 5 MG: 1 CAPSULE ORAL at 08:03

## 2018-03-15 RX ADMIN — NIFEDIPINE 60 MG: 60 TABLET, FILM COATED, EXTENDED RELEASE ORAL at 08:03

## 2018-03-15 RX ADMIN — INSULIN ASPART 1 UNITS: 100 INJECTION, SOLUTION INTRAVENOUS; SUBCUTANEOUS at 09:03

## 2018-03-15 RX ADMIN — MYCOPHENOLATE MOFETIL 1000 MG: 250 CAPSULE ORAL at 08:03

## 2018-03-15 RX ADMIN — DIPHENHYDRAMINE HYDROCHLORIDE 25 MG: 25 CAPSULE ORAL at 12:03

## 2018-03-15 RX ADMIN — TACROLIMUS 6 MG: 1 CAPSULE ORAL at 05:03

## 2018-03-15 RX ADMIN — GABAPENTIN 300 MG: 300 CAPSULE ORAL at 08:03

## 2018-03-15 RX ADMIN — Medication 2 G: at 12:03

## 2018-03-15 RX ADMIN — MAGNESIUM OXIDE TAB 400 MG (241.3 MG ELEMENTAL MG) 800 MG: 400 (241.3 MG) TAB at 08:03

## 2018-03-15 RX ADMIN — ANTI-THYMOCYTE GLOBULIN (RABBIT) 100 MG: 5 INJECTION, POWDER, LYOPHILIZED, FOR SOLUTION INTRAVENOUS at 01:03

## 2018-03-15 RX ADMIN — DEXTROSE: 50 INJECTION, SOLUTION INTRAVENOUS at 12:03

## 2018-03-15 RX ADMIN — ASPIRIN 81 MG: 81 TABLET, COATED ORAL at 08:03

## 2018-03-15 RX ADMIN — NYSTATIN 500000 UNITS: 500000 SUSPENSION ORAL at 12:03

## 2018-03-15 RX ADMIN — ATORVASTATIN CALCIUM 40 MG: 20 TABLET, FILM COATED ORAL at 08:03

## 2018-03-15 RX ADMIN — TRAZODONE HYDROCHLORIDE 25 MG: 50 TABLET ORAL at 08:03

## 2018-03-15 RX ADMIN — NYSTATIN 500000 UNITS: 500000 SUSPENSION ORAL at 05:03

## 2018-03-15 NOTE — PROGRESS NOTES
[facility]  Liver Transplant  Progress Note    Patient Name: Marcie Bazan  MRN: [id number]  Admission Date: 3/9/2018  Hospital Length of Stay: 6 days  Code Status: Full Code  Primary Care Provider: ODILIA Wall  Post-Operative Day: 79    ORGAN:   LIVER  Disease Etiology: Alcoholic Cirrhosis  Donor Type:    - Brain Death  CDC High Risk:   No  Donor CMV Status:   Donor CMV Status: Negative  Donor HBcAB:   Negative  Donor HCV Status:   Negative  Whole or Partial: Whole Liver  Biliary Anastomosis: End to End  Arterial Anatomy: Standard  Subjective:     History of Present Illness:  Ms. Marcie Bazan is a 52 y.o. 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 Post operative course complicated by hematoma evac on 18 and neutropenia requiring multiple doses of neupogen. Pt recently admitted with rejection. Liver biopsy was performed on showing mild ACR. She was treated with SM x 3 (, , and 3/1) with some improvement in LFTs seen. Of significance, liver pathology also showed moderate steatohepatitis. PETH from 2/15 negative.      Pt is being admitted as transfer from [facility] for acute volume overload. Pt reported gaining approx 30 lbs in 8 hours prior to presenting to the ED. She noticed swelling especially in her abdomen and legs with some SOB. She was given lasix at OSH prior to being transferred to [facility]. Upon arrival pt reports feeling much better, she still has some lower extremity edema as well as ascites, but reports it has significantly improved. She denies fever, nausea, vomiting, SOB, CP, abdominal pain, diarrhea. She has not taken any new medications nor eaten any new foods. , labs this morning significant for elevated LFTs and hypokalemia. CXR appears clear. Repeat labs, infectious lópez, echo, US liver, EKG and thyroid studies ordered.    Hospital Course:  Interval history: no acute events over night. LFTs  improving today. She tolerated Thymo #2 yesterday. Plan for Thymo #3 today.  Mag low, will replace.Monitor.     Scheduled Meds:   acetaminophen  650 mg Oral Once    anti-thymo immune glob (THYMOGLOBULIN -rabbit) IVPB  1.5 mg/kg Intravenous Once    aspirin  81 mg Oral Daily    atorvastatin  40 mg Oral Daily    diphenhydrAMINE  25 mg Oral Once    famotidine  20 mg Oral QHS    gabapentin  300 mg Oral BID    heparin (porcine)  5,000 Units Subcutaneous Q8H    magnesium oxide  800 mg Oral BID    magnesium sulfate IVPB  2 g Intravenous Once    mycophenolate  1,000 mg Oral BID    NIFEdipine  60 mg Oral Daily    nystatin  500,000 Units Oral TID PC    tacrolimus  6 mg Oral BID    traZODone  25 mg Oral QHS     Continuous Infusions:  PRN Meds:acetaminophen, bisacodyl, cyclobenzaprine, dextrose 50%, dextrose 50%, diphenhydrAMINE, glucagon (human recombinant), glucose, glucose, insulin aspart U-100, ondansetron, sodium chloride 0.9%, traMADol    Review of Systems   Constitutional: Negative for activity change, appetite change, chills, fatigue and fever.   HENT: Negative for congestion and facial swelling.    Eyes: Negative for pain, discharge and visual disturbance.   Respiratory: Negative for cough, chest tightness, shortness of breath and wheezing.    Cardiovascular: Negative for chest pain, palpitations and leg swelling.   Gastrointestinal: Positive for abdominal distention. Negative for abdominal pain, blood in stool, constipation, diarrhea, nausea and vomiting.   Endocrine: Negative.    Genitourinary: Negative for dysuria, frequency, hematuria and urgency.   Musculoskeletal: Negative for back pain.   Skin: Negative.  Negative for pallor and wound.   Allergic/Immunologic: Positive for immunocompromised state.   Neurological: Negative for dizziness, weakness, numbness and headaches.   Hematological: Negative.    Psychiatric/Behavioral: Negative.      Objective:     Vital Signs (Most Recent):  Temp: 97.6 °F (36.4  °C) (03/15/18 0751)  Pulse: 75 (03/15/18 0751)  Resp: 16 (03/15/18 0751)  BP: (!) 162/88 (03/15/18 0751)  SpO2: 99 % (03/15/18 0751) Vital Signs (24h Range):  Temp:  [97.6 °F (36.4 °C)-98.7 °F (37.1 °C)] 97.6 °F (36.4 °C)  Pulse:  [75-90] 75  Resp:  [16-18] 16  SpO2:  [98 %-99 %] 99 %  BP: (116-162)/(73-88) 162/88     Weight: 62.9 kg (138 lb 10.7 oz)  Body mass index is 24.57 kg/m².    Intake/Output - Last 3 Shifts       03/13 0700 - 03/14 0659 03/14 0700 - 03/15 0659 03/15 0700 - 03/16 0659    P.O. 1560 820     IV Piggyback 200 200     Total Intake(mL/kg) 1760 (27.3) 1020 (16.2)     Urine (mL/kg/hr) 2700 (1.7) 450 (0.3)     Emesis/NG output  0 (0)     Other  0 (0)     Stool 0 (0) 0 (0)     Blood  0 (0)     Total Output 2700 450      Net -940 +570             Urine Occurrence 0 x 2 x     Stool Occurrence 10 x 1 x     Emesis Occurrence  0 x           Physical Exam   Constitutional: She is oriented to person, place, and time. She appears well-developed. No distress.   HENT:   Head: Normocephalic and atraumatic.   Mouth/Throat: No oropharyngeal exudate.   Eyes: EOM are normal. Pupils are equal, round, and reactive to light. No scleral icterus.   Neck: Normal range of motion. Neck supple. No thyromegaly present.   Cardiovascular: Normal rate, regular rhythm, normal heart sounds and intact distal pulses.  Exam reveals no gallop and no friction rub.    No murmur heard.  Pulmonary/Chest: Effort normal and breath sounds normal. No respiratory distress. She has no wheezes. She exhibits no tenderness.   Abdominal: Soft. Bowel sounds are normal. She exhibits distension. There is no tenderness.   Musculoskeletal: Normal range of motion. She exhibits edema (trace BLE).   Neurological: She is alert and oriented to person, place, and time.   Skin: Skin is warm and dry. Capillary refill takes 2 to 3 seconds. She is not diaphoretic.   Psychiatric: She has a normal mood and affect. Her behavior is normal. Judgment and thought content  normal.   Nursing note and vitals reviewed.      Laboratory:  Immunosuppressants         Stop Route Frequency     tacrolimus capsule 6 mg      -- Oral 2 times daily     mycophenolate capsule 1,000 mg      -- Oral 2 times daily        CBC:   Recent Labs  Lab 03/15/18  0500   WBC 8.64   RBC 3.05*   HGB 8.9*   HCT 27.7*      MCV 91   MCH 29.2   MCHC 32.1     CMP:   Recent Labs  Lab 03/15/18  0500   *   CALCIUM 8.2*   ALBUMIN 2.4*   PROT 5.3*      K 3.7   CO2 25      BUN 20   CREATININE 0.6   ALKPHOS 288*   *   AST 32   BILITOT 0.6     Labs within the past 24 hours have been reviewed.    Diagnostic Results:  pertinent imaging reviewed    Assessment/Plan:     * Elevated liver enzymes    - admitted w elevated LFT's, pt recently treated w SM for mod ACR 2/27, 2/28, 3/1.  Pt readmitted w elevated LFT's, suspect persistent rejection.  Treated with solumedrol pulse, 3/9, 3/10 (2 doses) and 3/11.  - Liver biopsy 3/12/18- showed mild ACR.    - Central line placed for Thymo x 5 doses.  - Thymo dose #1 - 3/13/18    Thymo dose #2 - 3/14/18    Plan for Thymo #3 today 3/15/18  - Check CD3 count on Friday 3/16/18.            Liver transplant 12/26/2017 for ETOH    - s/p DBD OLTxp 12/26/17 for ETOH/PERKINS ESLD admitted w elevated LFTs.        Accelerated liver transplant rejection    - Liver bx 3/12, showing mild ACR.    - Thymo started 3/14, dose #2 on 3/14/18, dose #3 today 3/15.     - CD3 after third dose, 3/16/18 ordered.            Edema    - greatest in abdomen.   Administered Lasix 40 mg on 3/14, pt diuresed well, 2700 ml/24 hrs.  - Reviewed liver US from admit on 3/9 that did not show significant ascites.  Cont to monitor and dose Lasix on daily basis.        Long-term use of immunosuppressant medication    - see prophylactic immunosuppression.        Prophylactic immunotherapy    - continue prograf, steroids, and cellcept. Monitor labs daily and adjust dose accordingly for a therapeutic level.            At risk for opportunistic infections    - weekly cmv pcr,  No valcyte due to leukopenia.  - pentamidine for PCP prophylaxis.             Anemia of chronic disease    - H&H stable, monitor labs daily.        Hypomagnesemia    - mag low, replace IV  - cont PO supplementation             VTE Risk Mitigation         Ordered     heparin (porcine) injection 5,000 Units  Every 8 hours     Route:  Subcutaneous        03/09/18 1350     Medium Risk of VTE  Once      03/09/18 1350     Place sequential compression device  Until discontinued      03/09/18 1350          The patients clinical status was discussed at multidisplinary rounds, involving transplant surgery, transplant medicine, pharmacy, nursing, nutrition, and social work    Discharge Planning: Not a candidate for d/c at this time. Thymo #3 today, CD3 count tomorrow. Likely plan for Thymo #4 and #5 this weekend.     Rachell Terry PA-C  Liver Transplant  [facility]

## 2018-03-15 NOTE — SUBJECTIVE & OBJECTIVE
Scheduled Meds:   acetaminophen  650 mg Oral Once    anti-thymo immune glob (THYMOGLOBULIN -rabbit) IVPB  1.5 mg/kg Intravenous Once    aspirin  81 mg Oral Daily    atorvastatin  40 mg Oral Daily    diphenhydrAMINE  25 mg Oral Once    famotidine  20 mg Oral QHS    gabapentin  300 mg Oral BID    heparin (porcine)  5,000 Units Subcutaneous Q8H    magnesium oxide  800 mg Oral BID    magnesium sulfate IVPB  2 g Intravenous Once    mycophenolate  1,000 mg Oral BID    NIFEdipine  60 mg Oral Daily    nystatin  500,000 Units Oral TID PC    tacrolimus  6 mg Oral BID    traZODone  25 mg Oral QHS     Continuous Infusions:  PRN Meds:acetaminophen, bisacodyl, cyclobenzaprine, dextrose 50%, dextrose 50%, diphenhydrAMINE, glucagon (human recombinant), glucose, glucose, insulin aspart U-100, ondansetron, sodium chloride 0.9%, traMADol    Review of Systems   Constitutional: Negative for activity change, appetite change, chills, fatigue and fever.   HENT: Negative for congestion and facial swelling.    Eyes: Negative for pain, discharge and visual disturbance.   Respiratory: Negative for cough, chest tightness, shortness of breath and wheezing.    Cardiovascular: Negative for chest pain, palpitations and leg swelling.   Gastrointestinal: Positive for abdominal distention. Negative for abdominal pain, blood in stool, constipation, diarrhea, nausea and vomiting.   Endocrine: Negative.    Genitourinary: Negative for dysuria, frequency, hematuria and urgency.   Musculoskeletal: Negative for back pain.   Skin: Negative.  Negative for pallor and wound.   Allergic/Immunologic: Positive for immunocompromised state.   Neurological: Negative for dizziness, weakness, numbness and headaches.   Hematological: Negative.    Psychiatric/Behavioral: Negative.      Objective:     Vital Signs (Most Recent):  Temp: 97.6 °F (36.4 °C) (03/15/18 0751)  Pulse: 75 (03/15/18 0751)  Resp: 16 (03/15/18 0751)  BP: (!) 162/88 (03/15/18 0751)  SpO2:  99 % (03/15/18 0751) Vital Signs (24h Range):  Temp:  [97.6 °F (36.4 °C)-98.7 °F (37.1 °C)] 97.6 °F (36.4 °C)  Pulse:  [75-90] 75  Resp:  [16-18] 16  SpO2:  [98 %-99 %] 99 %  BP: (116-162)/(73-88) 162/88     Weight: 62.9 kg (138 lb 10.7 oz)  Body mass index is 24.57 kg/m².    Intake/Output - Last 3 Shifts       03/13 0700 - 03/14 0659 03/14 0700 - 03/15 0659 03/15 0700 - 03/16 0659    P.O. 1560 820     IV Piggyback 200 200     Total Intake(mL/kg) 1760 (27.3) 1020 (16.2)     Urine (mL/kg/hr) 2700 (1.7) 450 (0.3)     Emesis/NG output  0 (0)     Other  0 (0)     Stool 0 (0) 0 (0)     Blood  0 (0)     Total Output 2700 450      Net -940 +570             Urine Occurrence 0 x 2 x     Stool Occurrence 10 x 1 x     Emesis Occurrence  0 x           Physical Exam   Constitutional: She is oriented to person, place, and time. She appears well-developed. No distress.   HENT:   Head: Normocephalic and atraumatic.   Mouth/Throat: No oropharyngeal exudate.   Eyes: EOM are normal. Pupils are equal, round, and reactive to light. No scleral icterus.   Neck: Normal range of motion. Neck supple. No thyromegaly present.   Cardiovascular: Normal rate, regular rhythm, normal heart sounds and intact distal pulses.  Exam reveals no gallop and no friction rub.    No murmur heard.  Pulmonary/Chest: Effort normal and breath sounds normal. No respiratory distress. She has no wheezes. She exhibits no tenderness.   Abdominal: Soft. Bowel sounds are normal. She exhibits distension. There is no tenderness.   Musculoskeletal: Normal range of motion. She exhibits edema (trace BLE).   Neurological: She is alert and oriented to person, place, and time.   Skin: Skin is warm and dry. Capillary refill takes 2 to 3 seconds. She is not diaphoretic.   Psychiatric: She has a normal mood and affect. Her behavior is normal. Judgment and thought content normal.   Nursing note and vitals reviewed.      Laboratory:  Immunosuppressants         Stop Route Frequency      tacrolimus capsule 6 mg      -- Oral 2 times daily     mycophenolate capsule 1,000 mg      -- Oral 2 times daily        CBC:   Recent Labs  Lab 03/15/18  0500   WBC 8.64   RBC 3.05*   HGB 8.9*   HCT 27.7*      MCV 91   MCH 29.2   MCHC 32.1     CMP:   Recent Labs  Lab 03/15/18  0500   *   CALCIUM 8.2*   ALBUMIN 2.4*   PROT 5.3*      K 3.7   CO2 25      BUN 20   CREATININE 0.6   ALKPHOS 288*   *   AST 32   BILITOT 0.6     Labs within the past 24 hours have been reviewed.    Diagnostic Results:  pertinent imaging reviewed

## 2018-03-15 NOTE — ASSESSMENT & PLAN NOTE
- Liver bx 3/12, showing mild ACR.    - Thymo started 3/14, dose #2 on 3/14/18, dose #3 today 3/15.     - CD3 after third dose, 3/16/18 ordered.

## 2018-03-15 NOTE — PLAN OF CARE
Problem: Patient Care Overview  Goal: Plan of Care Review  Outcome: Ongoing (interventions implemented as appropriate)  AAOX4.  VSS.  Pt complaining of neck pain from where central line was placed, prn tramadol administered with pain relief.  Thymo #2 was administered, pt tolerated well.  5 doses total expected.  Bed is in lowest position, call bell is in reach, and pt instructed to call nurse when needing assistance.  Will continue to monitor.

## 2018-03-15 NOTE — ASSESSMENT & PLAN NOTE
- greatest in abdomen.   Administered Lasix 40 mg on 3/14, pt diuresed well, 2700 ml/24 hrs.  - Reviewed liver US from admit on 3/9 that did not show significant ascites.  Cont to monitor and dose Lasix on daily basis.

## 2018-03-15 NOTE — ASSESSMENT & PLAN NOTE
- admitted w elevated LFT's, pt recently treated w SM for mod ACR 2/27, 2/28, 3/1.  Pt readmitted w elevated LFT's, suspect persistent rejection.  Treated with solumedrol pulse, 3/9, 3/10 (2 doses) and 3/11.  - Liver biopsy 3/12/18- showed mild ACR.    - Central line placed for Thymo x 5 doses.  - Thymo dose #1 - 3/13/18    Thymo dose #2 - 3/14/18    Plan for Thymo #3 today 3/15/18  - Check CD3 count on Friday 3/16/18.

## 2018-03-15 NOTE — PLAN OF CARE
Problem: Patient Care Overview  Goal: Plan of Care Review  Outcome: Ongoing (interventions implemented as appropriate)  - Thymo #3 infusing at this time.  Premedication: Solumedrol 325 mg, Tylenol 650 mg & Benadryl 25 mg  - Plan for CD3 in AM  - Regular diet, tolerating well  - CBG ACHS, wnl so far today.  SSI if indicated  - Ascites persistent, monitor I&Os  - Bed low & locked, call light in reach, nonslip socks in use, calls for assistance as needed

## 2018-03-16 PROBLEM — T86.41: Status: ACTIVE | Noted: 2018-03-16

## 2018-03-16 LAB
ALBUMIN SERPL BCP-MCNC: 2.3 G/DL
ALP SERPL-CCNC: 242 U/L
ALT SERPL W/O P-5'-P-CCNC: 96 U/L
ANION GAP SERPL CALC-SCNC: 9 MMOL/L
AST SERPL-CCNC: 17 U/L
BASOPHILS # BLD AUTO: 0 K/UL
BASOPHILS NFR BLD: 0 %
BILIRUB SERPL-MCNC: 0.5 MG/DL
BUN SERPL-MCNC: 20 MG/DL
CALCIUM SERPL-MCNC: 8.3 MG/DL
CHLORIDE SERPL-SCNC: 108 MMOL/L
CO2 SERPL-SCNC: 24 MMOL/L
CREAT SERPL-MCNC: 0.6 MG/DL
DIFFERENTIAL METHOD: ABNORMAL
EOSINOPHIL # BLD AUTO: 0 K/UL
EOSINOPHIL NFR BLD: 0.1 %
ERYTHROCYTE [DISTWIDTH] IN BLOOD BY AUTOMATED COUNT: 20.3 %
EST. GFR  (AFRICAN AMERICAN): >60 ML/MIN/1.73 M^2
EST. GFR  (NON AFRICAN AMERICAN): >60 ML/MIN/1.73 M^2
GLUCOSE SERPL-MCNC: 123 MG/DL
HCT VFR BLD AUTO: 26.7 %
HGB BLD-MCNC: 8.4 G/DL
IMM GRANULOCYTES # BLD AUTO: 0.06 K/UL
IMM GRANULOCYTES NFR BLD AUTO: 0.7 %
LYMPHOCYTES # BLD AUTO: 0.2 K/UL
LYMPHOCYTES NFR BLD: 2 %
MAGNESIUM SERPL-MCNC: 1.6 MG/DL
MCH RBC QN AUTO: 29.1 PG
MCHC RBC AUTO-ENTMCNC: 31.5 G/DL
MCV RBC AUTO: 92 FL
MONOCYTES # BLD AUTO: 0.8 K/UL
MONOCYTES NFR BLD: 9.2 %
NEUTROPHILS # BLD AUTO: 7.6 K/UL
NEUTROPHILS NFR BLD: 88 %
NRBC BLD-RTO: 0 /100 WBC
PHOSPHATE SERPL-MCNC: 2.3 MG/DL
PLATELET # BLD AUTO: 190 K/UL
PMV BLD AUTO: 11 FL
POCT GLUCOSE: 138 MG/DL (ref 70–110)
POCT GLUCOSE: 183 MG/DL (ref 70–110)
POCT GLUCOSE: 79 MG/DL (ref 70–110)
POCT GLUCOSE: 88 MG/DL (ref 70–110)
POTASSIUM SERPL-SCNC: 3.3 MMOL/L
PROT SERPL-MCNC: 5 G/DL
RBC # BLD AUTO: 2.89 M/UL
SODIUM SERPL-SCNC: 141 MMOL/L
TACROLIMUS BLD-MCNC: 7.9 NG/ML
WBC # BLD AUTO: 8.58 K/UL

## 2018-03-16 PROCEDURE — 20600001 HC STEP DOWN PRIVATE ROOM

## 2018-03-16 PROCEDURE — 83735 ASSAY OF MAGNESIUM: CPT

## 2018-03-16 PROCEDURE — 25000003 PHARM REV CODE 250: Performed by: NURSE PRACTITIONER

## 2018-03-16 PROCEDURE — 25000003 PHARM REV CODE 250: Performed by: PHYSICIAN ASSISTANT

## 2018-03-16 PROCEDURE — 80197 ASSAY OF TACROLIMUS: CPT

## 2018-03-16 PROCEDURE — 84100 ASSAY OF PHOSPHORUS: CPT

## 2018-03-16 PROCEDURE — 85025 COMPLETE CBC W/AUTO DIFF WBC: CPT

## 2018-03-16 PROCEDURE — 80053 COMPREHEN METABOLIC PANEL: CPT

## 2018-03-16 PROCEDURE — 63600175 PHARM REV CODE 636 W HCPCS: Performed by: PHYSICIAN ASSISTANT

## 2018-03-16 PROCEDURE — 86359 T CELLS TOTAL COUNT: CPT

## 2018-03-16 RX ORDER — ACETAMINOPHEN 325 MG/1
650 TABLET ORAL ONCE
Status: COMPLETED | OUTPATIENT
Start: 2018-03-16 | End: 2018-03-16

## 2018-03-16 RX ORDER — PREDNISONE 10 MG/1
TABLET ORAL
Qty: 60 TABLET | Refills: 0 | Status: SHIPPED | OUTPATIENT
Start: 2018-03-16 | End: 2018-05-09 | Stop reason: ALTCHOICE

## 2018-03-16 RX ORDER — VALGANCICLOVIR 450 MG/1
450 TABLET, FILM COATED ORAL DAILY
Qty: 30 TABLET | Refills: 2 | Status: SHIPPED | OUTPATIENT
Start: 2018-03-17 | End: 2018-05-22 | Stop reason: SINTOL

## 2018-03-16 RX ORDER — DIPHENHYDRAMINE HCL 25 MG
25 CAPSULE ORAL ONCE
Status: COMPLETED | OUTPATIENT
Start: 2018-03-16 | End: 2018-03-16

## 2018-03-16 RX ORDER — POTASSIUM CHLORIDE 750 MG/1
20 CAPSULE, EXTENDED RELEASE ORAL ONCE
Status: COMPLETED | OUTPATIENT
Start: 2018-03-16 | End: 2018-03-16

## 2018-03-16 RX ORDER — FUROSEMIDE 40 MG/1
40 TABLET ORAL DAILY
Status: DISCONTINUED | OUTPATIENT
Start: 2018-03-16 | End: 2018-03-17 | Stop reason: HOSPADM

## 2018-03-16 RX ORDER — VALGANCICLOVIR 450 MG/1
450 TABLET, FILM COATED ORAL DAILY
Status: DISCONTINUED | OUTPATIENT
Start: 2018-03-16 | End: 2018-03-17 | Stop reason: HOSPADM

## 2018-03-16 RX ADMIN — DIBASIC SODIUM PHOSPHATE, MONOBASIC POTASSIUM PHOSPHATE AND MONOBASIC SODIUM PHOSPHATE 1 TABLET: 852; 155; 130 TABLET ORAL at 08:03

## 2018-03-16 RX ADMIN — GABAPENTIN 300 MG: 300 CAPSULE ORAL at 08:03

## 2018-03-16 RX ADMIN — ACETAMINOPHEN 650 MG: 325 TABLET ORAL at 02:03

## 2018-03-16 RX ADMIN — MYCOPHENOLATE MOFETIL 1000 MG: 250 CAPSULE ORAL at 08:03

## 2018-03-16 RX ADMIN — TACROLIMUS 6 MG: 1 CAPSULE ORAL at 05:03

## 2018-03-16 RX ADMIN — TACROLIMUS 6 MG: 1 CAPSULE ORAL at 08:03

## 2018-03-16 RX ADMIN — NYSTATIN 500000 UNITS: 500000 SUSPENSION ORAL at 05:03

## 2018-03-16 RX ADMIN — FAMOTIDINE 20 MG: 20 TABLET, FILM COATED ORAL at 08:03

## 2018-03-16 RX ADMIN — TRAZODONE HYDROCHLORIDE 25 MG: 50 TABLET ORAL at 08:03

## 2018-03-16 RX ADMIN — NYSTATIN 500000 UNITS: 500000 SUSPENSION ORAL at 12:03

## 2018-03-16 RX ADMIN — MAGNESIUM OXIDE TAB 400 MG (241.3 MG ELEMENTAL MG) 800 MG: 400 (241.3 MG) TAB at 08:03

## 2018-03-16 RX ADMIN — DEXTROSE: 50 INJECTION, SOLUTION INTRAVENOUS at 02:03

## 2018-03-16 RX ADMIN — ATORVASTATIN CALCIUM 40 MG: 20 TABLET, FILM COATED ORAL at 08:03

## 2018-03-16 RX ADMIN — DIPHENHYDRAMINE HYDROCHLORIDE 25 MG: 25 CAPSULE ORAL at 02:03

## 2018-03-16 RX ADMIN — ASPIRIN 81 MG: 81 TABLET, COATED ORAL at 08:03

## 2018-03-16 RX ADMIN — POTASSIUM CHLORIDE 20 MEQ: 750 CAPSULE, EXTENDED RELEASE ORAL at 12:03

## 2018-03-16 RX ADMIN — FUROSEMIDE 40 MG: 40 TABLET ORAL at 02:03

## 2018-03-16 RX ADMIN — TRAMADOL HYDROCHLORIDE 50 MG: 50 TABLET, COATED ORAL at 08:03

## 2018-03-16 RX ADMIN — NIFEDIPINE 60 MG: 60 TABLET, FILM COATED, EXTENDED RELEASE ORAL at 09:03

## 2018-03-16 RX ADMIN — ANTI-THYMOCYTE GLOBULIN (RABBIT) 100 MG: 5 INJECTION, POWDER, LYOPHILIZED, FOR SOLUTION INTRAVENOUS at 02:03

## 2018-03-16 RX ADMIN — VALGANCICLOVIR 450 MG: 450 TABLET, FILM COATED ORAL at 12:03

## 2018-03-16 RX ADMIN — NYSTATIN 500000 UNITS: 500000 SUSPENSION ORAL at 08:03

## 2018-03-16 NOTE — ASSESSMENT & PLAN NOTE
- admitted w elevated LFT's, pt recently treated w SM for mod ACR 2/27, 2/28, 3/1.  Pt readmitted w elevated LFT's, suspect persistent rejection.  Treated with solumedrol pulse, 3/9, 3/10 (2 doses) and 3/11.  - Liver biopsy 3/12/18- showed mild ACR.    - Central line placed for Thymo x 5 doses.  - Thymo dose #1 - 3/13/18    Thymo dose #2 - 3/14/18    Thymo dose #3 - 3/15/18    Plan for Thymo dose #4 today- 3/16/18  - CD3 count pending

## 2018-03-16 NOTE — DISCHARGE SUMMARY
[facility]  Liver Transplant  Discharge Summary      Patient Name: Marcie Bazan  MRN: [id number]  Admission Date: 3/9/2018  Hospital Length of Stay: 8 days  Discharge Date and Time:  2018 11:46 AM  Attending Physician: Zacarias Gleason MD   Discharging Provider: Devon Robles NP  Primary Care Provider: ODILIA Wall  Post-Operative Day: 80     ORGAN:   LIVER  Disease Etiology: Alcoholic Cirrhosis  Donor Type:    - Brain Death  CDC High Risk:   No  Donor CMV Status:   Donor CMV Status: Negative  Donor HBcAB:   Negative  Donor HCV Status:   Negative  Whole or Partial: Whole Liver  Biliary Anastomosis: End to End  Arterial Anatomy: Standard    HPI:   Ms. Marcie Bazan is a 52 y.o. 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 Post operative course complicated by hematoma evac on 18 and neutropenia requiring multiple doses of neupogen. Pt recently admitted with rejection. Liver biopsy was performed on showing mild ACR. She was treated with SM x 3 (, , and 3/1) with some improvement in LFTs seen. Of significance, liver pathology also showed moderate steatohepatitis. PETH from 2/15 negative. Pt admitted as transfer from [facility] for acute volume overload. Pt reported gaining approx 30 lbs in 8 hours prior to presenting to the ED. She noticed swelling especially in her abdomen and legs with some SOB. . Patient given lasix 40mg PO daily intermittently throughout admission with good response. Will d/c home with PO lasix 20mg daily volume overload.     Previous liver bx with mild ACR and moderate steatohepatitis, now s/p SMP -3/1. LFTs again elevated on admission. Treated empirically with SMPx3 on 3/9, 3/10, and 3/11. Repeat liver bx 3/12/18 with mild ACR. Decision made to proceed with Thymoglobulin treatment x5. Thymo #1 on 3/13, Thymo #2 on 3/14, thymo #3 on 3/15, Thymo #4 on 3/16, and Thymo #5 on  3/17. CD3 count sent 3/16/18, results pending. LFTs trending down nicely. Seeing as LFTs trending down will plan to d/c home today.      She will have outpatient labs Monday 3/19/18. She will follow up in clinic per protocol.    Pt is stable and ready for discharge. She is ambulating well and tolerating diet. Encouraged to continue 2L fluid restriction. She has no complaints. She has met with PharmD and received education. She has expressed understanding of discharge instructions and importance of follow-up.     Final Active Diagnoses:    Diagnosis Date Noted POA    PRINCIPAL PROBLEM:  Elevated liver enzymes [R74.8] 02/27/2018 Yes    Liver transplant 12/26/2017 for ETOH [Z94.4] 12/26/2017 Not Applicable    Prophylactic immunotherapy [Z29.8] 12/26/2017 Not Applicable    Anemia of chronic disease [D63.8] 08/27/2015 Yes    Hypomagnesemia [E83.42] 10/19/2017 Yes    Accelerated liver transplant rejection [T86.41] 03/16/2018 Yes    Edema [R60.9] 03/09/2018 Yes    Long-term use of immunosuppressant medication [Z79.899] 02/14/2018 Not Applicable    At risk for opportunistic infections [Z91.89] 12/30/2017 Yes    Hypokalemia [E87.6] 09/17/2015 Yes      Problems Resolved During this Admission:    Diagnosis Date Noted Date Resolved POA    Abdominal pain [R10.9] 03/09/2018 03/09/2018 Yes    History of alcohol abuse [Z87.898] 11/27/2017 03/12/2018 Yes       Consults         Status Ordering Provider     Inpatient consult to Registered Dietitian/Nutritionist  Once     Provider:  (Not yet assigned)    Completed NICOLÁS PINK          Pending Diagnostic Studies:     Procedure Component Value Units Date/Time    CD3 count [[id number]] Collected:  03/16/18 0515    Order Status:  Sent Lab Status:  In process Updated:  03/16/18 0753    Specimen:  Blood from Blood         Significant Diagnostic Studies: Labs:   CMP     Recent Labs  Lab 03/16/18 0515 03/17/18  0500    140   K 3.3* 3.4*    107   CO2 24 25   GLU  123* 75   BUN 20 26*   CREATININE 0.6 0.8   CALCIUM 8.3* 8.5*   PROT 5.0* 5.8*   ALBUMIN 2.3* 2.7*   BILITOT 0.5 0.6   ALKPHOS 242* 249*   AST 17 16   ALT 96* 86*   ANIONGAP 9 8   ESTGFRAFRICA >60.0 >60.0   EGFRNONAA >60.0 >60.0   , CBC     Recent Labs  Lab 03/16/18  0515 03/17/18  0500   WBC 8.58 6.50   HGB 8.4* 9.0*   HCT 26.7* 28.9*    213   , INR   Lab Results   Component Value Date    INR 1.0 03/09/2018    INR 1.0 03/09/2018    INR 1.1 02/27/2018   , Lipid Panel   Lab Results   Component Value Date    CHOL 271 (H) 03/02/2018    HDL 8 (L) 03/02/2018    LDLCALC 216.0 (H) 03/02/2018    TRIG 235 (H) 03/02/2018    CHOLHDL 3.0 (L) 03/02/2018    and All labs within the past 24 hours have been reviewed    The patients clinical status was discussed at multidisplinary rounds, involving transplant surgery, transplant medicine, pharmacy, nursing, nutrition, and social work    Discharged Condition: good    Disposition: Home or Self Care    Follow Up:  Follow-up Information     as scheduled per transplant coordinator. .    Why:  As needed, If symptoms worsen, and as scheduled per transplant coordinator.                Patient Instructions:     Call MD for:  temperature >100.4     Call MD for:  persistent nausea and vomiting or diarrhea     Call MD for:  severe uncontrolled pain     Call MD for:  redness, tenderness, or signs of infection (pain, swelling, redness, odor or green/yellow discharge around incision site)     Call MD for:  difficulty breathing or increased cough     Call MD for:  severe persistent headache     Call MD for:  worsening rash     Call MD for:  persistent dizziness, light-headedness, or visual disturbances     Call MD for:  increased confusion or weakness     Call MD for:   Order Comments: For any concerning signs or symptoms.       Medications:  Reconciled Home Medications:   Current Discharge Medication List      START taking these medications    Details   furosemide (LASIX) 20 MG tablet Take  1 tablet (20 mg total) by mouth once daily.  Qty: 30 tablet, Refills: 11      valGANciclovir (VALCYTE) 450 mg Tab Take 1 tablet (450 mg total) by mouth once daily. Stop 6/15/18  Qty: 30 tablet, Refills: 2         CONTINUE these medications which have CHANGED    Details   cyclobenzaprine (FLEXERIL) 5 MG tablet Take 1 tablet (5 mg total) by mouth 3 (three) times daily as needed for Muscle spasms.  Qty: 30 tablet, Refills: 0      magnesium oxide (MAG-OX) 400 mg tablet Take 2 tablets (800 mg total) by mouth 2 (two) times daily.  Qty: 120 tablet, Refills: 2      nystatin (MYCOSTATIN) 100,000 unit/mL suspension Take 5 mLs (500,000 Units total) by mouth 3 (three) times daily after meals. STOP 4/17/18  Qty: 473 mL, Refills: 0    Associated Diagnoses: Liver replaced by transplant      predniSONE (DELTASONE) 10 MG tablet Take PO Daily: 20mg 3/18-3/24, 15mg 3/25-3/31, 10mg 4/1-4/7, 5mg 4/8-4/14, Stop 4/15  Qty: 60 tablet, Refills: 0    Associated Diagnoses: Liver replaced by transplant      tacrolimus (PROGRAF) 1 MG Cap Take 6 capsules (6 mg total) by mouth every 12 (twelve) hours.  Qty: 360 capsule, Refills: 11         CONTINUE these medications which have NOT CHANGED    Details   aspirin (ECOTRIN) 81 MG EC tablet Take 1 tablet (81 mg total) by mouth once daily.  Qty: 30 tablet, Refills: 11      atorvastatin (LIPITOR) 40 MG tablet Take 1 tablet (40 mg total) by mouth once daily.  Qty: 90 tablet, Refills: 3      bisacodyl (DULCOLAX) 5 mg EC tablet Take 1-2 tablets (5-10 mg total) by mouth daily as needed for Constipation.  Qty: 30 tablet, Refills: 0      famotidine (PEPCID) 20 MG tablet Take 1 tablet (20 mg total) by mouth every evening.  Qty: 30 tablet, Refills: 11      gabapentin (NEURONTIN) 300 MG capsule Take 1 capsule (300 mg total) by mouth 2 (two) times daily.  Qty: 60 capsule, Refills: 11      mycophenolate (CELLCEPT) 250 mg Cap Take 4 capsules (1,000 mg total) by mouth 2 (two) times daily.  Qty: 240 capsule, Refills:  11    Associated Diagnoses: Liver replaced by transplant      NIFEdipine (PROCARDIA-XL) 60 MG (OSM) 24 hr tablet Take 1 tablet (60 mg total) by mouth once daily.  Qty: 30 tablet, Refills: 11      ondansetron (ZOFRAN-ODT) 4 MG TbDL DISSOLVE 1 TABLET BY MOUTH EVERY 8 HOURS AS NEEDED FOR NAUSEA AND VOMITTING  Qty: 12 tablet, Refills: 2      traZODone (DESYREL) 50 MG tablet Take 0.5 tablets (25 mg total) by mouth every evening.  Qty: 15 tablet, Refills: 11         STOP taking these medications       HYDROmorphone (DILAUDID) 4 MG tablet Comments:   Reason for Stopping:             Time spent caring for patient (Greater than 1/2 spent in direct face-to-face contact): > 30 minutes    Devon Robles NP  Liver Transplant  [facility]

## 2018-03-16 NOTE — PROGRESS NOTES
Discharge Note: Patient tentatively scheduled to be discharged Saturday to her home in [place] under the care of her son Zaire # [phone].  The pt was alone at assessment but reported adequate coping.  Pt was observed to be sitting bedside on her bed alert, oreinted x4 and communicative.  The pt reported she waiting for her nurse or PCT to present to help her wash her hair.  The patient denied any questions or concern when discussing discharge planning.  SW inquired about the medical records for her ongoing SS claim.  Pt reported that she purchased another CD with her medical records on it as the request was over 100 pgs.  The pt reported she was advised once over 100 pgs everything must go on a CD.  The pt denied any questions or concerns at this time and will discharge with no needs from SW. Patient verbalized understanding and agreement with the information reviewed, social work availability, and how to access available resources if needed. SW remains available.

## 2018-03-16 NOTE — PLAN OF CARE
Problem: Patient Care Overview  Goal: Plan of Care Review  Pt AAOx4, VSS, in bed with upper siderails raised x2, bed in lowest/locked position, call light/personal belongings within reach. Pt instructed to call for assistance. Pt verbalizes understanding. Pt afebrile at this time.  Proper hand hygiene performed before and after pt care.  Last bg 220, covered with 1u. Will continue to monitor pt.

## 2018-03-16 NOTE — ASSESSMENT & PLAN NOTE
- Liver bx 3/12, showing mild ACR.    - Thymo started 3/13, dose #2 on 3/14/18, dose #3 on 3/15, dose #4 today 3/16/18.     - CD3 ordered 3/16/18, pending.

## 2018-03-16 NOTE — SUBJECTIVE & OBJECTIVE
Scheduled Meds:   acetaminophen  650 mg Oral Once    anti-thymo immune glob (THYMOGLOBULIN -rabbit) IVPB  1.5 mg/kg Intravenous Once    aspirin  81 mg Oral Daily    atorvastatin  40 mg Oral Daily    diphenhydrAMINE  25 mg Oral Once    famotidine  20 mg Oral QHS    furosemide  40 mg Oral Daily    gabapentin  300 mg Oral BID    heparin (porcine)  5,000 Units Subcutaneous Q8H    k phos di & mono-sod phos mono  250 mg Oral BID    magnesium oxide  800 mg Oral BID    methylPREDNISolone (SOLU-Medrol) IVPB (doses > 250 mg)   Intravenous Once    mycophenolate  1,000 mg Oral BID    NIFEdipine  60 mg Oral Daily    nystatin  500,000 Units Oral TID PC    tacrolimus  6 mg Oral BID    traZODone  25 mg Oral QHS    valGANciclovir  450 mg Oral Daily     Continuous Infusions:  PRN Meds:acetaminophen, bisacodyl, cyclobenzaprine, dextrose 50%, dextrose 50%, diphenhydrAMINE, glucagon (human recombinant), glucose, glucose, insulin aspart U-100, ondansetron, sodium chloride 0.9%, traMADol    Review of Systems   Constitutional: Negative for activity change, appetite change, chills, fatigue and fever.   HENT: Negative for congestion and facial swelling.    Eyes: Negative for pain, discharge and visual disturbance.   Respiratory: Negative for cough, chest tightness, shortness of breath and wheezing.    Cardiovascular: Positive for leg swelling. Negative for chest pain and palpitations.   Gastrointestinal: Positive for abdominal distention. Negative for abdominal pain, blood in stool, constipation, diarrhea, nausea and vomiting.   Endocrine: Negative.    Genitourinary: Negative for dysuria, frequency, hematuria and urgency.   Musculoskeletal: Negative for back pain.   Skin: Negative.  Negative for pallor and wound.   Allergic/Immunologic: Positive for immunocompromised state.   Neurological: Negative for dizziness, weakness, numbness and headaches.   Hematological: Negative.    Psychiatric/Behavioral: Negative.       Objective:     Vital Signs (Most Recent):  Temp: 97.3 °F (36.3 °C) (03/16/18 1250)  Pulse: 95 (03/16/18 1250)  Resp: 16 (03/16/18 1250)  BP: 130/71 (03/16/18 1250)  SpO2: 100 % (03/16/18 1250) Vital Signs (24h Range):  Temp:  [97.3 °F (36.3 °C)-98.8 °F (37.1 °C)] 97.3 °F (36.3 °C)  Pulse:  [77-95] 95  Resp:  [15-18] 16  SpO2:  [96 %-100 %] 100 %  BP: (106-153)/(59-81) 130/71     Weight: 62.9 kg (138 lb 10.7 oz)  Body mass index is 24.57 kg/m².    Intake/Output - Last 3 Shifts       03/14 0700 - 03/15 0659 03/15 0700 - 03/16 0659 03/16 0700 - 03/17 0659    P.O. 820 900     I.V. (mL/kg)  50 (0.8)     IV Piggyback 200 200     Total Intake(mL/kg) 1020 (16.2) 1150 (18.3)     Urine (mL/kg/hr) 450 (0.3) 1800 (1.2)     Emesis/NG output 0 (0)      Other 0 (0)      Stool 0 (0) 0 (0)     Blood 0 (0)      Total Output 450 1800      Net +570 -650             Urine Occurrence 2 x      Stool Occurrence 1 x 1 x     Emesis Occurrence 0 x            Physical Exam   Constitutional: She is oriented to person, place, and time. She appears well-developed. No distress.   HENT:   Head: Normocephalic and atraumatic.   Mouth/Throat: No oropharyngeal exudate.   Eyes: EOM are normal. Pupils are equal, round, and reactive to light. No scleral icterus.   Neck: Normal range of motion. Neck supple. No thyromegaly present.   Cardiovascular: Normal rate, regular rhythm, normal heart sounds and intact distal pulses.  Exam reveals no gallop and no friction rub.    No murmur heard.  Pulmonary/Chest: Effort normal and breath sounds normal. No respiratory distress. She has no wheezes. She exhibits no tenderness.   Abdominal: Soft. Bowel sounds are normal. She exhibits distension. There is no tenderness.   Musculoskeletal: Normal range of motion. She exhibits edema (trace BLE).   Neurological: She is alert and oriented to person, place, and time.   Skin: Skin is warm and dry. Capillary refill takes 2 to 3 seconds. She is not diaphoretic.    Psychiatric: She has a normal mood and affect. Her behavior is normal. Judgment and thought content normal.   Nursing note and vitals reviewed.      Laboratory:  Immunosuppressants         Stop Route Frequency     tacrolimus capsule 6 mg      -- Oral 2 times daily     mycophenolate capsule 1,000 mg      -- Oral 2 times daily        CBC:   Recent Labs  Lab 03/16/18  0515   WBC 8.58   RBC 2.89*   HGB 8.4*   HCT 26.7*      MCV 92   MCH 29.1   MCHC 31.5*     CMP:   Recent Labs  Lab 03/16/18  0515   *   CALCIUM 8.3*   ALBUMIN 2.3*   PROT 5.0*      K 3.3*   CO2 24      BUN 20   CREATININE 0.6   ALKPHOS 242*   ALT 96*   AST 17   BILITOT 0.5     Labs within the past 24 hours have been reviewed.    Diagnostic Results:  pertinent imaging reviewed

## 2018-03-16 NOTE — PROGRESS NOTES
[facility]  Liver Transplant  Progress Note    Patient Name: Marcie Bazan  MRN: [id number]  Admission Date: 3/9/2018  Hospital Length of Stay: 7 days  Code Status: Full Code  Primary Care Provider: ODILIA Wall  Post-Operative Day: 80    ORGAN:   LIVER  Disease Etiology: Alcoholic Cirrhosis  Donor Type:    - Brain Death  CDC High Risk:   No  Donor CMV Status:   Donor CMV Status: Negative  Donor HBcAB:   Negative  Donor HCV Status:   Negative  Whole or Partial: Whole Liver  Biliary Anastomosis: End to End  Arterial Anatomy: Standard  Subjective:     History of Present Illness:  Ms. Marcie Bazan is a 52 y.o. 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 Post operative course complicated by hematoma evac on 18 and neutropenia requiring multiple doses of neupogen. Pt recently admitted with rejection. Liver biopsy was performed on showing mild ACR. She was treated with SM x 3 (, , and 3/1) with some improvement in LFTs seen. Of significance, liver pathology also showed moderate steatohepatitis. PETH from 2/15 negative.      Pt is being admitted as transfer from [facility] for acute volume overload. Pt reported gaining approx 30 lbs in 8 hours prior to presenting to the ED. She noticed swelling especially in her abdomen and legs with some SOB. She was given lasix at OSH prior to being transferred to [facility]. Upon arrival pt reports feeling much better, she still has some lower extremity edema as well as ascites, but reports it has significantly improved. She denies fever, nausea, vomiting, SOB, CP, abdominal pain, diarrhea. She has not taken any new medications nor eaten any new foods. , labs this morning significant for elevated LFTs and hypokalemia. CXR appears clear. Repeat labs, infectious lópez, echo, US liver, EKG and thyroid studies ordered.    Hospital Course:  Interval history: no acute events over night. LFTs  improving. Plan for Thymo #4 today. CD3 count sent this morning, pending. Potassium and phos low, will replace. Pt with some LE edema, will give 40mg PO lasix x1 today. Will assess daily need.  Monitor.     Scheduled Meds:   acetaminophen  650 mg Oral Once    anti-thymo immune glob (THYMOGLOBULIN -rabbit) IVPB  1.5 mg/kg Intravenous Once    aspirin  81 mg Oral Daily    atorvastatin  40 mg Oral Daily    diphenhydrAMINE  25 mg Oral Once    famotidine  20 mg Oral QHS    furosemide  40 mg Oral Daily    gabapentin  300 mg Oral BID    heparin (porcine)  5,000 Units Subcutaneous Q8H    k phos di & mono-sod phos mono  250 mg Oral BID    magnesium oxide  800 mg Oral BID    methylPREDNISolone (SOLU-Medrol) IVPB (doses > 250 mg)   Intravenous Once    mycophenolate  1,000 mg Oral BID    NIFEdipine  60 mg Oral Daily    nystatin  500,000 Units Oral TID PC    tacrolimus  6 mg Oral BID    traZODone  25 mg Oral QHS    valGANciclovir  450 mg Oral Daily     Continuous Infusions:  PRN Meds:acetaminophen, bisacodyl, cyclobenzaprine, dextrose 50%, dextrose 50%, diphenhydrAMINE, glucagon (human recombinant), glucose, glucose, insulin aspart U-100, ondansetron, sodium chloride 0.9%, traMADol    Review of Systems   Constitutional: Negative for activity change, appetite change, chills, fatigue and fever.   HENT: Negative for congestion and facial swelling.    Eyes: Negative for pain, discharge and visual disturbance.   Respiratory: Negative for cough, chest tightness, shortness of breath and wheezing.    Cardiovascular: Positive for leg swelling. Negative for chest pain and palpitations.   Gastrointestinal: Positive for abdominal distention. Negative for abdominal pain, blood in stool, constipation, diarrhea, nausea and vomiting.   Endocrine: Negative.    Genitourinary: Negative for dysuria, frequency, hematuria and urgency.   Musculoskeletal: Negative for back pain.   Skin: Negative.  Negative for pallor and wound.    Allergic/Immunologic: Positive for immunocompromised state.   Neurological: Negative for dizziness, weakness, numbness and headaches.   Hematological: Negative.    Psychiatric/Behavioral: Negative.      Objective:     Vital Signs (Most Recent):  Temp: 97.3 °F (36.3 °C) (03/16/18 1250)  Pulse: 95 (03/16/18 1250)  Resp: 16 (03/16/18 1250)  BP: 130/71 (03/16/18 1250)  SpO2: 100 % (03/16/18 1250) Vital Signs (24h Range):  Temp:  [97.3 °F (36.3 °C)-98.8 °F (37.1 °C)] 97.3 °F (36.3 °C)  Pulse:  [77-95] 95  Resp:  [15-18] 16  SpO2:  [96 %-100 %] 100 %  BP: (106-153)/(59-81) 130/71     Weight: 62.9 kg (138 lb 10.7 oz)  Body mass index is 24.57 kg/m².    Intake/Output - Last 3 Shifts       03/14 0700 - 03/15 0659 03/15 0700 - 03/16 0659 03/16 0700 - 03/17 0659    P.O. 820 900     I.V. (mL/kg)  50 (0.8)     IV Piggyback 200 200     Total Intake(mL/kg) 1020 (16.2) 1150 (18.3)     Urine (mL/kg/hr) 450 (0.3) 1800 (1.2)     Emesis/NG output 0 (0)      Other 0 (0)      Stool 0 (0) 0 (0)     Blood 0 (0)      Total Output 450 1800      Net +570 -650             Urine Occurrence 2 x      Stool Occurrence 1 x 1 x     Emesis Occurrence 0 x            Physical Exam   Constitutional: She is oriented to person, place, and time. She appears well-developed. No distress.   HENT:   Head: Normocephalic and atraumatic.   Mouth/Throat: No oropharyngeal exudate.   Eyes: EOM are normal. Pupils are equal, round, and reactive to light. No scleral icterus.   Neck: Normal range of motion. Neck supple. No thyromegaly present.   Cardiovascular: Normal rate, regular rhythm, normal heart sounds and intact distal pulses.  Exam reveals no gallop and no friction rub.    No murmur heard.  Pulmonary/Chest: Effort normal and breath sounds normal. No respiratory distress. She has no wheezes. She exhibits no tenderness.   Abdominal: Soft. Bowel sounds are normal. She exhibits distension. There is no tenderness.   Musculoskeletal: Normal range of motion. She  exhibits edema (trace BLE).   Neurological: She is alert and oriented to person, place, and time.   Skin: Skin is warm and dry. Capillary refill takes 2 to 3 seconds. She is not diaphoretic.   Psychiatric: She has a normal mood and affect. Her behavior is normal. Judgment and thought content normal.   Nursing note and vitals reviewed.      Laboratory:  Immunosuppressants         Stop Route Frequency     tacrolimus capsule 6 mg      -- Oral 2 times daily     mycophenolate capsule 1,000 mg      -- Oral 2 times daily        CBC:   Recent Labs  Lab 03/16/18  0515   WBC 8.58   RBC 2.89*   HGB 8.4*   HCT 26.7*      MCV 92   MCH 29.1   MCHC 31.5*     CMP:   Recent Labs  Lab 03/16/18  0515   *   CALCIUM 8.3*   ALBUMIN 2.3*   PROT 5.0*      K 3.3*   CO2 24      BUN 20   CREATININE 0.6   ALKPHOS 242*   ALT 96*   AST 17   BILITOT 0.5     Labs within the past 24 hours have been reviewed.    Diagnostic Results:  pertinent imaging reviewed    Assessment/Plan:     * Elevated liver enzymes    - admitted w elevated LFT's, pt recently treated w SM for mod ACR 2/27, 2/28, 3/1.  Pt readmitted w elevated LFT's, suspect persistent rejection.  Treated with solumedrol pulse, 3/9, 3/10 (2 doses) and 3/11.  - Liver biopsy 3/12/18- showed mild ACR.    - Central line placed for Thymo x 5 doses.  - Thymo dose #1 - 3/13/18    Thymo dose #2 - 3/14/18    Thymo dose #3 - 3/15/18    Plan for Thymo dose #4 today- 3/16/18  - CD3 count pending            Liver transplant 12/26/2017 for ETOH    - s/p DBD OLTxp 12/26/17 for ETOH/PERKINS ESLD admitted w elevated LFTs.        Accelerated liver transplant rejection    - Liver bx 3/12, showing mild ACR.    - Thymo started 3/13, dose #2 on 3/14/18, dose #3 on 3/15, dose #4 today 3/16/18.     - CD3 ordered 3/16/18, pending.            Edema    - greatest in abdomen.   Administered Lasix 40 mg on 3/14, pt diuresed well, 2700 ml/24 hrs.  - Reviewed liver US from admit on 3/9 that did not  show significant ascites.  Cont to monitor and dose Lasix on daily basis.  - still with LE edema 3/16/18, will give 40mg PO lasix x1.         Long-term use of immunosuppressant medication    - see prophylactic immunosuppression.        Prophylactic immunotherapy    - continue prograf, steroids, and cellcept. Monitor labs daily and adjust dose accordingly for a therapeutic level.           At risk for opportunistic infections    - weekly cmv pcr,  No valcyte due to leukopenia.  - pentamidine for PCP prophylaxis.             Anemia of chronic disease    - H&H stable, monitor labs daily.        Hypomagnesemia    - improved today  - cont PO supplementation         Hypokalemia    - potassium decreased, microk today. Monitor.             VTE Risk Mitigation         Ordered     heparin (porcine) injection 5,000 Units  Every 8 hours     Route:  Subcutaneous        03/09/18 1350     Medium Risk of VTE  Once      03/09/18 1350     Place sequential compression device  Until discontinued      03/09/18 1350          The patients clinical status was discussed at multidisplinary rounds, involving transplant surgery, transplant medicine, pharmacy, nursing, nutrition, and social work    Discharge Planning: Not a candidate for d/c at this time. Possible d/c tomorrow pending CD3 count and LFTs.     Rachell Terry PA-C  Liver Transplant  [facility]

## 2018-03-16 NOTE — ASSESSMENT & PLAN NOTE
- greatest in abdomen.   Administered Lasix 40 mg on 3/14, pt diuresed well, 2700 ml/24 hrs.  - Reviewed liver US from admit on 3/9 that did not show significant ascites.  Cont to monitor and dose Lasix on daily basis.  - still with LE edema 3/16/18, will give 40mg PO lasix x1.

## 2018-03-17 VITALS
DIASTOLIC BLOOD PRESSURE: 75 MMHG | BODY MASS INDEX: 26.88 KG/M2 | HEIGHT: 63 IN | RESPIRATION RATE: 16 BRPM | HEART RATE: 88 BPM | WEIGHT: 151.69 LBS | OXYGEN SATURATION: 96 % | SYSTOLIC BLOOD PRESSURE: 150 MMHG | TEMPERATURE: 99 F

## 2018-03-17 LAB
ALBUMIN SERPL BCP-MCNC: 2.7 G/DL
ALP SERPL-CCNC: 249 U/L
ALT SERPL W/O P-5'-P-CCNC: 86 U/L
ANION GAP SERPL CALC-SCNC: 8 MMOL/L
AST SERPL-CCNC: 16 U/L
BASOPHILS # BLD AUTO: 0 K/UL
BASOPHILS NFR BLD: 0 %
BILIRUB SERPL-MCNC: 0.6 MG/DL
BUN SERPL-MCNC: 26 MG/DL
CALCIUM SERPL-MCNC: 8.5 MG/DL
CHLORIDE SERPL-SCNC: 107 MMOL/L
CO2 SERPL-SCNC: 25 MMOL/L
CREAT SERPL-MCNC: 0.8 MG/DL
DIFFERENTIAL METHOD: ABNORMAL
EOSINOPHIL # BLD AUTO: 0 K/UL
EOSINOPHIL NFR BLD: 0 %
ERYTHROCYTE [DISTWIDTH] IN BLOOD BY AUTOMATED COUNT: 20.1 %
EST. GFR  (AFRICAN AMERICAN): >60 ML/MIN/1.73 M^2
EST. GFR  (NON AFRICAN AMERICAN): >60 ML/MIN/1.73 M^2
GLUCOSE SERPL-MCNC: 75 MG/DL
HCT VFR BLD AUTO: 28.9 %
HGB BLD-MCNC: 9 G/DL
IMM GRANULOCYTES # BLD AUTO: 0.04 K/UL
IMM GRANULOCYTES NFR BLD AUTO: 0.6 %
LYMPHOCYTES # BLD AUTO: 0.2 K/UL
LYMPHOCYTES NFR BLD: 2.6 %
MAGNESIUM SERPL-MCNC: 1.4 MG/DL
MCH RBC QN AUTO: 28.7 PG
MCHC RBC AUTO-ENTMCNC: 31.1 G/DL
MCV RBC AUTO: 92 FL
MONOCYTES # BLD AUTO: 0.6 K/UL
MONOCYTES NFR BLD: 8.5 %
NEUTROPHILS # BLD AUTO: 5.7 K/UL
NEUTROPHILS NFR BLD: 88.3 %
NRBC BLD-RTO: 0 /100 WBC
PHOSPHATE SERPL-MCNC: 3 MG/DL
PLATELET # BLD AUTO: 213 K/UL
PMV BLD AUTO: 10.9 FL
POCT GLUCOSE: 202 MG/DL (ref 70–110)
POCT GLUCOSE: 91 MG/DL (ref 70–110)
POTASSIUM SERPL-SCNC: 3.4 MMOL/L
PROT SERPL-MCNC: 5.8 G/DL
RBC # BLD AUTO: 3.14 M/UL
SODIUM SERPL-SCNC: 140 MMOL/L
TACROLIMUS BLD-MCNC: 8.3 NG/ML
WBC # BLD AUTO: 6.5 K/UL

## 2018-03-17 PROCEDURE — 25000003 PHARM REV CODE 250: Performed by: PHYSICIAN ASSISTANT

## 2018-03-17 PROCEDURE — 83735 ASSAY OF MAGNESIUM: CPT

## 2018-03-17 PROCEDURE — 25000003 PHARM REV CODE 250: Performed by: NURSE PRACTITIONER

## 2018-03-17 PROCEDURE — 84100 ASSAY OF PHOSPHORUS: CPT

## 2018-03-17 PROCEDURE — 85025 COMPLETE CBC W/AUTO DIFF WBC: CPT

## 2018-03-17 PROCEDURE — 99239 HOSP IP/OBS DSCHRG MGMT >30: CPT | Mod: 24,,, | Performed by: NURSE PRACTITIONER

## 2018-03-17 PROCEDURE — 63600175 PHARM REV CODE 636 W HCPCS: Performed by: PHYSICIAN ASSISTANT

## 2018-03-17 PROCEDURE — 94640 AIRWAY INHALATION TREATMENT: CPT

## 2018-03-17 PROCEDURE — 80053 COMPREHEN METABOLIC PANEL: CPT

## 2018-03-17 PROCEDURE — 63600175 PHARM REV CODE 636 W HCPCS: Mod: JG | Performed by: NURSE PRACTITIONER

## 2018-03-17 PROCEDURE — 80197 ASSAY OF TACROLIMUS: CPT

## 2018-03-17 RX ORDER — CYCLOBENZAPRINE HCL 5 MG
5 TABLET ORAL 3 TIMES DAILY PRN
Qty: 30 TABLET | Refills: 1 | Status: SHIPPED | OUTPATIENT
Start: 2018-03-17 | End: 2018-09-18 | Stop reason: SDUPTHER

## 2018-03-17 RX ORDER — ALBUTEROL SULFATE 0.83 MG/ML
2.5 SOLUTION RESPIRATORY (INHALATION) EVERY 4 HOURS PRN
Status: DISCONTINUED | OUTPATIENT
Start: 2018-03-17 | End: 2018-03-17 | Stop reason: HOSPADM

## 2018-03-17 RX ORDER — CYCLOBENZAPRINE HCL 5 MG
5 TABLET ORAL 3 TIMES DAILY PRN
Qty: 30 TABLET | Refills: 0 | Status: SHIPPED | OUTPATIENT
Start: 2018-03-17 | End: 2018-03-17

## 2018-03-17 RX ORDER — TACROLIMUS 1 MG/1
6 CAPSULE ORAL EVERY 12 HOURS
Qty: 360 CAPSULE | Refills: 11 | Status: SHIPPED | OUTPATIENT
Start: 2018-03-17 | End: 2018-03-17

## 2018-03-17 RX ORDER — PREDNISONE 10 MG/1
20 TABLET ORAL DAILY
Status: DISCONTINUED | OUTPATIENT
Start: 2018-03-17 | End: 2018-03-17 | Stop reason: HOSPADM

## 2018-03-17 RX ORDER — LANOLIN ALCOHOL/MO/W.PET/CERES
800 CREAM (GRAM) TOPICAL 2 TIMES DAILY
Qty: 120 TABLET | Refills: 2 | Status: SHIPPED | OUTPATIENT
Start: 2018-03-17 | End: 2018-07-03 | Stop reason: SDUPTHER

## 2018-03-17 RX ORDER — ACETAMINOPHEN 325 MG/1
650 TABLET ORAL ONCE AS NEEDED
Status: DISCONTINUED | OUTPATIENT
Start: 2018-03-17 | End: 2018-03-17 | Stop reason: HOSPADM

## 2018-03-17 RX ORDER — PENTAMIDINE ISETHIONATE 300 MG/300MG
300 INHALANT RESPIRATORY (INHALATION) ONCE
Status: COMPLETED | OUTPATIENT
Start: 2018-03-17 | End: 2018-03-17

## 2018-03-17 RX ORDER — DIPHENHYDRAMINE HCL 25 MG
25 CAPSULE ORAL ONCE
Status: COMPLETED | OUTPATIENT
Start: 2018-03-17 | End: 2018-03-17

## 2018-03-17 RX ORDER — TACROLIMUS 1 MG/1
6 CAPSULE ORAL EVERY 12 HOURS
Qty: 360 CAPSULE | Refills: 11 | Status: SHIPPED | OUTPATIENT
Start: 2018-03-17 | End: 2018-04-04 | Stop reason: SDUPTHER

## 2018-03-17 RX ORDER — EPINEPHRINE 1 MG/ML
1 INJECTION, SOLUTION INTRACARDIAC; INTRAMUSCULAR; INTRAVENOUS; SUBCUTANEOUS ONCE AS NEEDED
Status: DISCONTINUED | OUTPATIENT
Start: 2018-03-17 | End: 2018-03-17 | Stop reason: HOSPADM

## 2018-03-17 RX ORDER — DIPHENHYDRAMINE HCL 50 MG
50 CAPSULE ORAL ONCE AS NEEDED
Status: DISCONTINUED | OUTPATIENT
Start: 2018-03-17 | End: 2018-03-17 | Stop reason: HOSPADM

## 2018-03-17 RX ORDER — ACETAMINOPHEN 325 MG/1
650 TABLET ORAL ONCE
Status: COMPLETED | OUTPATIENT
Start: 2018-03-17 | End: 2018-03-17

## 2018-03-17 RX ORDER — NYSTATIN 100000 [USP'U]/ML
500000 SUSPENSION ORAL
Qty: 473 ML | Refills: 0 | Status: SHIPPED | OUTPATIENT
Start: 2018-03-18 | End: 2018-04-17

## 2018-03-17 RX ORDER — FUROSEMIDE 20 MG/1
20 TABLET ORAL DAILY
Qty: 30 TABLET | Refills: 11 | Status: SHIPPED | OUTPATIENT
Start: 2018-03-18 | End: 2018-04-04 | Stop reason: DRUGHIGH

## 2018-03-17 RX ADMIN — MYCOPHENOLATE MOFETIL 1000 MG: 250 CAPSULE ORAL at 08:03

## 2018-03-17 RX ADMIN — NYSTATIN 500000 UNITS: 500000 SUSPENSION ORAL at 08:03

## 2018-03-17 RX ADMIN — FUROSEMIDE 40 MG: 40 TABLET ORAL at 08:03

## 2018-03-17 RX ADMIN — TRAMADOL HYDROCHLORIDE 50 MG: 50 TABLET, COATED ORAL at 08:03

## 2018-03-17 RX ADMIN — MAGNESIUM OXIDE TAB 400 MG (241.3 MG ELEMENTAL MG) 800 MG: 400 (241.3 MG) TAB at 08:03

## 2018-03-17 RX ADMIN — GABAPENTIN 300 MG: 300 CAPSULE ORAL at 08:03

## 2018-03-17 RX ADMIN — PREDNISONE 20 MG: 10 TABLET ORAL at 10:03

## 2018-03-17 RX ADMIN — TACROLIMUS 6 MG: 1 CAPSULE ORAL at 08:03

## 2018-03-17 RX ADMIN — PENTAMIDINE ISETHIONATE 300 MG: 300 INHALANT RESPIRATORY (INHALATION) at 01:03

## 2018-03-17 RX ADMIN — ATORVASTATIN CALCIUM 40 MG: 20 TABLET, FILM COATED ORAL at 08:03

## 2018-03-17 RX ADMIN — VALGANCICLOVIR 450 MG: 450 TABLET, FILM COATED ORAL at 08:03

## 2018-03-17 RX ADMIN — NIFEDIPINE 60 MG: 60 TABLET, FILM COATED, EXTENDED RELEASE ORAL at 09:03

## 2018-03-17 RX ADMIN — TACROLIMUS 6 MG: 1 CAPSULE ORAL at 05:03

## 2018-03-17 RX ADMIN — ASPIRIN 81 MG: 81 TABLET, COATED ORAL at 08:03

## 2018-03-17 RX ADMIN — TRAMADOL HYDROCHLORIDE 50 MG: 50 TABLET, COATED ORAL at 02:03

## 2018-03-17 RX ADMIN — ANTI-THYMOCYTE GLOBULIN (RABBIT) 100 MG: 5 INJECTION, POWDER, LYOPHILIZED, FOR SOLUTION INTRAVENOUS at 11:03

## 2018-03-17 RX ADMIN — TRAMADOL HYDROCHLORIDE 50 MG: 50 TABLET, COATED ORAL at 05:03

## 2018-03-17 RX ADMIN — DIPHENHYDRAMINE HYDROCHLORIDE 25 MG: 25 CAPSULE ORAL at 10:03

## 2018-03-17 RX ADMIN — ACETAMINOPHEN 650 MG: 325 TABLET ORAL at 10:03

## 2018-03-17 NOTE — PLAN OF CARE
Problem: Patient Care Overview  Goal: Plan of Care Review  Outcome: Ongoing (interventions implemented as appropriate)  Patient's VSS for shift. Patient remains AAOx4, calm, cooperative, and independent. Patient getting last dose of thymo today. Tolerating medication well. Patient ambulating frequently in hallways-refusing heparin. Will discharge to home with sister after thymo done.

## 2018-03-18 LAB — POCT GLUCOSE: 89 MG/DL (ref 70–110)

## 2018-03-19 ENCOUNTER — LAB VISIT (OUTPATIENT)
Dept: LAB | Facility: HOSPITAL | Age: 53
End: 2018-03-19
Attending: INTERNAL MEDICINE
Payer: COMMERCIAL

## 2018-03-19 DIAGNOSIS — Z94.4 LIVER REPLACED BY TRANSPLANT: ICD-10-CM

## 2018-03-19 LAB
ALBUMIN SERPL BCP-MCNC: 2.9 G/DL
ALP SERPL-CCNC: 212 U/L
ALT SERPL W/O P-5'-P-CCNC: 57 U/L
ANION GAP SERPL CALC-SCNC: 10 MMOL/L
AST SERPL-CCNC: 17 U/L
BASOPHILS # BLD AUTO: 0 K/UL
BASOPHILS NFR BLD: 0 %
BILIRUB DIRECT SERPL-MCNC: 0.4 MG/DL
BILIRUB SERPL-MCNC: 0.7 MG/DL
BUN SERPL-MCNC: 24 MG/DL
CALCIUM SERPL-MCNC: 8.5 MG/DL
CHLORIDE SERPL-SCNC: 107 MMOL/L
CO2 SERPL-SCNC: 27 MMOL/L
CREAT SERPL-MCNC: 0.6 MG/DL
DIFFERENTIAL METHOD: ABNORMAL
EOSINOPHIL # BLD AUTO: 0 K/UL
EOSINOPHIL NFR BLD: 0.5 %
ERYTHROCYTE [DISTWIDTH] IN BLOOD BY AUTOMATED COUNT: 20.2 %
EST. GFR  (AFRICAN AMERICAN): >60 ML/MIN/1.73 M^2
EST. GFR  (NON AFRICAN AMERICAN): >60 ML/MIN/1.73 M^2
GLUCOSE SERPL-MCNC: 78 MG/DL
HCT VFR BLD AUTO: 30.8 %
HGB BLD-MCNC: 9.7 G/DL
LYMPHOCYTES # BLD AUTO: 0.7 K/UL
LYMPHOCYTES NFR BLD: 8.9 %
MCH RBC QN AUTO: 28.6 PG
MCHC RBC AUTO-ENTMCNC: 31.5 G/DL
MCV RBC AUTO: 91 FL
MONOCYTES # BLD AUTO: 0.1 K/UL
MONOCYTES NFR BLD: 1.6 %
NEUTROPHILS # BLD AUTO: 6.8 K/UL
NEUTROPHILS NFR BLD: 89 %
PLATELET # BLD AUTO: 163 K/UL
PMV BLD AUTO: 9.8 FL
POTASSIUM SERPL-SCNC: 3 MMOL/L
PROT SERPL-MCNC: 5.7 G/DL
RBC # BLD AUTO: 3.39 M/UL
SODIUM SERPL-SCNC: 144 MMOL/L
WBC # BLD AUTO: 7.68 K/UL

## 2018-03-19 PROCEDURE — 85025 COMPLETE CBC W/AUTO DIFF WBC: CPT | Mod: PO

## 2018-03-19 PROCEDURE — 36415 COLL VENOUS BLD VENIPUNCTURE: CPT | Mod: PO

## 2018-03-19 PROCEDURE — 82248 BILIRUBIN DIRECT: CPT | Mod: PO

## 2018-03-19 PROCEDURE — 80197 ASSAY OF TACROLIMUS: CPT

## 2018-03-19 PROCEDURE — 80053 COMPREHEN METABOLIC PANEL: CPT | Mod: PO

## 2018-03-20 ENCOUNTER — OFFICE VISIT (OUTPATIENT)
Dept: OPHTHALMOLOGY | Facility: CLINIC | Age: 53
End: 2018-03-20
Payer: COMMERCIAL

## 2018-03-20 DIAGNOSIS — H04.123 DRY EYES, BILATERAL: Primary | ICD-10-CM

## 2018-03-20 DIAGNOSIS — H52.4 HYPEROPIA OF BOTH EYES WITH ASTIGMATISM AND PRESBYOPIA: ICD-10-CM

## 2018-03-20 DIAGNOSIS — H52.203 HYPEROPIA OF BOTH EYES WITH ASTIGMATISM AND PRESBYOPIA: ICD-10-CM

## 2018-03-20 DIAGNOSIS — H52.03 HYPEROPIA OF BOTH EYES WITH ASTIGMATISM AND PRESBYOPIA: ICD-10-CM

## 2018-03-20 DIAGNOSIS — Z13.5 SCREENING FOR GLAUCOMA: ICD-10-CM

## 2018-03-20 DIAGNOSIS — H52.4 PRESBYOPIA: ICD-10-CM

## 2018-03-20 LAB
ABSOLUTE CD3: 20 CELLS/UL (ref 700–2100)
CD3%: 10.6 % (ref 55–83)
CMV DNA SERPL NAA+PROBE-ACNC: NORMAL IU/ML
TACROLIMUS BLD-MCNC: 8 NG/ML

## 2018-03-20 PROCEDURE — 92014 COMPRE OPH EXAM EST PT 1/>: CPT | Mod: S$GLB,,, | Performed by: OPTOMETRIST

## 2018-03-20 PROCEDURE — 99211 OFF/OP EST MAY X REQ PHY/QHP: CPT | Mod: PBBFAC,PO | Performed by: OPTOMETRIST

## 2018-03-20 PROCEDURE — 92015 DETERMINE REFRACTIVE STATE: CPT | Mod: S$GLB,,, | Performed by: OPTOMETRIST

## 2018-03-20 PROCEDURE — 99999 PR PBB SHADOW E&M-EST. PATIENT-LVL I: CPT | Mod: PBBFAC,,, | Performed by: OPTOMETRIST

## 2018-03-20 NOTE — TELEPHONE ENCOUNTER
----- Message from Joselin Souza MD sent at 3/20/2018  1:54 PM CDT -----  Liver tests improving; potassium trending further down likely from lasix. Need to see if lasix still needed; if so then patient needs to start 40 mEQ of potassium chloride; repeat Thurs/Fri

## 2018-03-20 NOTE — TELEPHONE ENCOUNTER
Dr. Souza reviewed your labs.  Called patient to see if she is still taking the lasix, she is taking. Potassium Chloride

## 2018-03-20 NOTE — PROGRESS NOTES
HPI     Last [facility] exam 10/03/2016  Liver transplant   PredniSONE use   Screening for glaucoma  RE  Decreased distance and near vision  History of dry eyes    Last edited by JESSICA Álvarez, OD on 3/20/2018 10:37 AM. (History)            Assessment /Plan     For exam results, see Encounter Report.    Dry eyes, bilateral    Screening for glaucoma    Hyperopia of both eyes with astigmatism and presbyopia    Presbyopia      Dry eyes = Systane Balance prn.  OH OK OU otherwise.  Spec Rx given.  RTC one year or prn.

## 2018-03-21 RX ORDER — ASPIRIN 81 MG/1
81 TABLET ORAL DAILY
Qty: 30 TABLET | Refills: 11 | Status: SHIPPED | OUTPATIENT
Start: 2018-03-21 | End: 2018-09-13

## 2018-03-21 RX ORDER — POTASSIUM CHLORIDE 20 MEQ/1
40 TABLET, EXTENDED RELEASE ORAL DAILY
Qty: 60 TABLET | Refills: 2 | Status: SHIPPED | OUTPATIENT
Start: 2018-03-21 | End: 2018-03-22 | Stop reason: CLARIF

## 2018-03-22 ENCOUNTER — LAB VISIT (OUTPATIENT)
Dept: LAB | Facility: HOSPITAL | Age: 53
End: 2018-03-22
Attending: INTERNAL MEDICINE
Payer: COMMERCIAL

## 2018-03-22 ENCOUNTER — TELEPHONE (OUTPATIENT)
Dept: TRANSPLANT | Facility: CLINIC | Age: 53
End: 2018-03-22

## 2018-03-22 DIAGNOSIS — Z94.4 LIVER REPLACED BY TRANSPLANT: ICD-10-CM

## 2018-03-22 LAB
ALBUMIN SERPL BCP-MCNC: 3.2 G/DL
ALP SERPL-CCNC: 178 U/L
ALT SERPL W/O P-5'-P-CCNC: 38 U/L
ANION GAP SERPL CALC-SCNC: 8 MMOL/L
AST SERPL-CCNC: 16 U/L
BASOPHILS # BLD AUTO: 0 K/UL
BASOPHILS NFR BLD: 0 %
BILIRUB DIRECT SERPL-MCNC: 0.4 MG/DL
BILIRUB SERPL-MCNC: 0.7 MG/DL
BUN SERPL-MCNC: 23 MG/DL
CALCIUM SERPL-MCNC: 9 MG/DL
CHLORIDE SERPL-SCNC: 111 MMOL/L
CO2 SERPL-SCNC: 25 MMOL/L
CREAT SERPL-MCNC: 0.7 MG/DL
DIFFERENTIAL METHOD: ABNORMAL
EOSINOPHIL # BLD AUTO: 0.1 K/UL
EOSINOPHIL NFR BLD: 2.4 %
ERYTHROCYTE [DISTWIDTH] IN BLOOD BY AUTOMATED COUNT: 19.9 %
EST. GFR  (AFRICAN AMERICAN): >60 ML/MIN/1.73 M^2
EST. GFR  (NON AFRICAN AMERICAN): >60 ML/MIN/1.73 M^2
GLUCOSE SERPL-MCNC: 85 MG/DL
HCT VFR BLD AUTO: 29.1 %
HGB BLD-MCNC: 9.1 G/DL
LYMPHOCYTES # BLD AUTO: 0.5 K/UL
LYMPHOCYTES NFR BLD: 8.6 %
MCH RBC QN AUTO: 28.7 PG
MCHC RBC AUTO-ENTMCNC: 31.3 G/DL
MCV RBC AUTO: 92 FL
MONOCYTES # BLD AUTO: 0.3 K/UL
MONOCYTES NFR BLD: 4.3 %
NEUTROPHILS # BLD AUTO: 5 K/UL
NEUTROPHILS NFR BLD: 84.7 %
PLATELET # BLD AUTO: 176 K/UL
PMV BLD AUTO: 9.9 FL
POTASSIUM SERPL-SCNC: 4.8 MMOL/L
PROT SERPL-MCNC: 6.1 G/DL
RBC # BLD AUTO: 3.17 M/UL
SODIUM SERPL-SCNC: 144 MMOL/L
WBC # BLD AUTO: 5.84 K/UL

## 2018-03-22 PROCEDURE — 80197 ASSAY OF TACROLIMUS: CPT

## 2018-03-22 PROCEDURE — 82248 BILIRUBIN DIRECT: CPT | Mod: PO

## 2018-03-22 PROCEDURE — 36415 COLL VENOUS BLD VENIPUNCTURE: CPT | Mod: PO

## 2018-03-22 PROCEDURE — 85025 COMPLETE CBC W/AUTO DIFF WBC: CPT | Mod: PO

## 2018-03-22 PROCEDURE — 80053 COMPREHEN METABOLIC PANEL: CPT | Mod: PO

## 2018-03-22 RX ORDER — POTASSIUM CHLORIDE 750 MG/1
40 CAPSULE, EXTENDED RELEASE ORAL DAILY
Qty: 80 CAPSULE | Refills: 0 | Status: SHIPPED | OUTPATIENT
Start: 2018-03-22 | End: 2018-05-02 | Stop reason: SDUPTHER

## 2018-03-23 LAB — TACROLIMUS BLD-MCNC: 26.9 NG/ML

## 2018-03-26 ENCOUNTER — LAB VISIT (OUTPATIENT)
Dept: LAB | Facility: HOSPITAL | Age: 53
End: 2018-03-26
Attending: INTERNAL MEDICINE
Payer: COMMERCIAL

## 2018-03-26 DIAGNOSIS — Z94.4 LIVER REPLACED BY TRANSPLANT: ICD-10-CM

## 2018-03-26 LAB
ALBUMIN SERPL BCP-MCNC: 3.3 G/DL
ALP SERPL-CCNC: 153 U/L
ALT SERPL W/O P-5'-P-CCNC: 24 U/L
ANION GAP SERPL CALC-SCNC: 5 MMOL/L
AST SERPL-CCNC: 12 U/L
BASOPHILS # BLD AUTO: 0.01 K/UL
BASOPHILS NFR BLD: 0.2 %
BILIRUB DIRECT SERPL-MCNC: 0.4 MG/DL
BILIRUB SERPL-MCNC: 0.7 MG/DL
BUN SERPL-MCNC: 28 MG/DL
CALCIUM SERPL-MCNC: 9.2 MG/DL
CHLORIDE SERPL-SCNC: 108 MMOL/L
CO2 SERPL-SCNC: 26 MMOL/L
CREAT SERPL-MCNC: 0.8 MG/DL
DIFFERENTIAL METHOD: ABNORMAL
EOSINOPHIL # BLD AUTO: 0.1 K/UL
EOSINOPHIL NFR BLD: 1.8 %
ERYTHROCYTE [DISTWIDTH] IN BLOOD BY AUTOMATED COUNT: 19.1 %
EST. GFR  (AFRICAN AMERICAN): >60 ML/MIN/1.73 M^2
EST. GFR  (NON AFRICAN AMERICAN): >60 ML/MIN/1.73 M^2
GLUCOSE SERPL-MCNC: 84 MG/DL
HCT VFR BLD AUTO: 29.9 %
HGB BLD-MCNC: 9.4 G/DL
LYMPHOCYTES # BLD AUTO: 0.8 K/UL
LYMPHOCYTES NFR BLD: 16.1 %
MCH RBC QN AUTO: 28.3 PG
MCHC RBC AUTO-ENTMCNC: 31.4 G/DL
MCV RBC AUTO: 90 FL
MONOCYTES # BLD AUTO: 0.1 K/UL
MONOCYTES NFR BLD: 2.4 %
NEUTROPHILS # BLD AUTO: 4 K/UL
NEUTROPHILS NFR BLD: 79.5 %
PLATELET # BLD AUTO: 172 K/UL
PMV BLD AUTO: 9 FL
POTASSIUM SERPL-SCNC: 4.5 MMOL/L
PROT SERPL-MCNC: 6.1 G/DL
RBC # BLD AUTO: 3.32 M/UL
SODIUM SERPL-SCNC: 139 MMOL/L
WBC # BLD AUTO: 5.03 K/UL

## 2018-03-26 PROCEDURE — 36415 COLL VENOUS BLD VENIPUNCTURE: CPT | Mod: PO

## 2018-03-26 PROCEDURE — 80197 ASSAY OF TACROLIMUS: CPT

## 2018-03-26 PROCEDURE — 82248 BILIRUBIN DIRECT: CPT

## 2018-03-26 PROCEDURE — 85025 COMPLETE CBC W/AUTO DIFF WBC: CPT | Mod: PO

## 2018-03-26 PROCEDURE — 80053 COMPREHEN METABOLIC PANEL: CPT

## 2018-03-27 ENCOUNTER — TELEPHONE (OUTPATIENT)
Dept: TRANSPLANT | Facility: CLINIC | Age: 53
End: 2018-03-27

## 2018-03-27 LAB — TACROLIMUS BLD-MCNC: 10.4 NG/ML

## 2018-03-27 NOTE — TELEPHONE ENCOUNTER
Returned call ok for grand daughter to have her shots, advised not to change her diaper for at least 10 days after.

## 2018-03-27 NOTE — TELEPHONE ENCOUNTER
----- Message from Kerry Torres sent at 3/27/2018  2:37 PM CDT -----  Contact: Pt  Has a questions regarding medication she can be around.    She would like a call today.     Call her @ [phone]

## 2018-03-28 LAB — CMV DNA SERPL NAA+PROBE-ACNC: NORMAL IU/ML

## 2018-03-29 ENCOUNTER — TELEPHONE (OUTPATIENT)
Dept: TRANSPLANT | Facility: CLINIC | Age: 53
End: 2018-03-29

## 2018-03-29 NOTE — TELEPHONE ENCOUNTER
----- Message from Joselin Souza MD sent at 3/28/2018  5:34 PM CDT -----  Liver tests continue to improve; continue with weekly labs

## 2018-03-29 NOTE — TELEPHONE ENCOUNTER
Dr. Souza reviewed your labs.  Sent message via portal, labs continue to improve no medication changes.  Have labs on 04/02/18, you will see Dr Souza in clinic on Wednesday

## 2018-04-02 ENCOUNTER — LAB VISIT (OUTPATIENT)
Dept: LAB | Facility: HOSPITAL | Age: 53
End: 2018-04-02
Attending: INTERNAL MEDICINE
Payer: COMMERCIAL

## 2018-04-02 DIAGNOSIS — Z94.4 LIVER REPLACED BY TRANSPLANT: ICD-10-CM

## 2018-04-02 LAB
ALBUMIN SERPL BCP-MCNC: 3.5 G/DL
ALP SERPL-CCNC: 122 U/L
ALT SERPL W/O P-5'-P-CCNC: 13 U/L
ANION GAP SERPL CALC-SCNC: 11 MMOL/L
AST SERPL-CCNC: 10 U/L
BASOPHILS # BLD AUTO: 0.01 K/UL
BASOPHILS NFR BLD: 0.2 %
BILIRUB DIRECT SERPL-MCNC: 0.3 MG/DL
BILIRUB SERPL-MCNC: 0.5 MG/DL
BUN SERPL-MCNC: 28 MG/DL
CALCIUM SERPL-MCNC: 9.4 MG/DL
CHLORIDE SERPL-SCNC: 108 MMOL/L
CO2 SERPL-SCNC: 24 MMOL/L
CREAT SERPL-MCNC: 0.8 MG/DL
DIFFERENTIAL METHOD: ABNORMAL
EOSINOPHIL # BLD AUTO: 0.1 K/UL
EOSINOPHIL NFR BLD: 1.3 %
ERYTHROCYTE [DISTWIDTH] IN BLOOD BY AUTOMATED COUNT: 18 %
EST. GFR  (AFRICAN AMERICAN): >60 ML/MIN/1.73 M^2
EST. GFR  (NON AFRICAN AMERICAN): >60 ML/MIN/1.73 M^2
GLUCOSE SERPL-MCNC: 89 MG/DL
HCT VFR BLD AUTO: 31.2 %
HGB BLD-MCNC: 9.8 G/DL
LYMPHOCYTES # BLD AUTO: 1 K/UL
LYMPHOCYTES NFR BLD: 22.4 %
MCH RBC QN AUTO: 27.6 PG
MCHC RBC AUTO-ENTMCNC: 31.4 G/DL
MCV RBC AUTO: 88 FL
MONOCYTES # BLD AUTO: 0.1 K/UL
MONOCYTES NFR BLD: 1.8 %
NEUTROPHILS # BLD AUTO: 3.4 K/UL
NEUTROPHILS NFR BLD: 74.3 %
PLATELET # BLD AUTO: 199 K/UL
PMV BLD AUTO: 10 FL
POTASSIUM SERPL-SCNC: 3.7 MMOL/L
PROT SERPL-MCNC: 6.3 G/DL
RBC # BLD AUTO: 3.55 M/UL
SODIUM SERPL-SCNC: 143 MMOL/L
WBC # BLD AUTO: 4.51 K/UL

## 2018-04-02 PROCEDURE — 36415 COLL VENOUS BLD VENIPUNCTURE: CPT | Mod: PO

## 2018-04-02 PROCEDURE — 82248 BILIRUBIN DIRECT: CPT | Mod: PO

## 2018-04-02 PROCEDURE — 85025 COMPLETE CBC W/AUTO DIFF WBC: CPT | Mod: PO

## 2018-04-02 PROCEDURE — 80053 COMPREHEN METABOLIC PANEL: CPT | Mod: PO

## 2018-04-02 PROCEDURE — 80197 ASSAY OF TACROLIMUS: CPT

## 2018-04-03 LAB
CMV DNA SERPL NAA+PROBE-ACNC: NORMAL IU/ML
TACROLIMUS BLD-MCNC: 12.5 NG/ML

## 2018-04-04 ENCOUNTER — OFFICE VISIT (OUTPATIENT)
Dept: GASTROENTEROLOGY | Facility: CLINIC | Age: 53
End: 2018-04-04
Payer: COMMERCIAL

## 2018-04-04 VITALS
WEIGHT: 149.94 LBS | DIASTOLIC BLOOD PRESSURE: 92 MMHG | BODY MASS INDEX: 27.59 KG/M2 | HEIGHT: 62 IN | SYSTOLIC BLOOD PRESSURE: 152 MMHG | HEART RATE: 88 BPM

## 2018-04-04 DIAGNOSIS — I10 ESSENTIAL HYPERTENSION: Chronic | ICD-10-CM

## 2018-04-04 DIAGNOSIS — Z29.89 PROPHYLACTIC IMMUNOTHERAPY: ICD-10-CM

## 2018-04-04 DIAGNOSIS — T86.41: ICD-10-CM

## 2018-04-04 DIAGNOSIS — Z94.4 LIVER REPLACED BY TRANSPLANT: ICD-10-CM

## 2018-04-04 DIAGNOSIS — G62.9 NEUROPATHY: ICD-10-CM

## 2018-04-04 DIAGNOSIS — R60.0 BILATERAL LEG EDEMA: Primary | ICD-10-CM

## 2018-04-04 PROBLEM — E44.0 MODERATE PROTEIN-CALORIE MALNUTRITION: Status: RESOLVED | Noted: 2017-10-19 | Resolved: 2018-04-04

## 2018-04-04 PROBLEM — T81.31XA: Status: RESOLVED | Noted: 2018-01-02 | Resolved: 2018-04-04

## 2018-04-04 PROCEDURE — 99999 PR PBB SHADOW E&M-EST. PATIENT-LVL III: CPT | Mod: PBBFAC,,, | Performed by: INTERNAL MEDICINE

## 2018-04-04 PROCEDURE — 99214 OFFICE O/P EST MOD 30 MIN: CPT | Mod: S$PBB,,, | Performed by: INTERNAL MEDICINE

## 2018-04-04 PROCEDURE — 99213 OFFICE O/P EST LOW 20 MIN: CPT | Mod: PBBFAC,PO | Performed by: INTERNAL MEDICINE

## 2018-04-04 RX ORDER — FUROSEMIDE 40 MG/1
40 TABLET ORAL DAILY
Qty: 30 TABLET | Refills: 5 | Status: SHIPPED | OUTPATIENT
Start: 2018-04-04 | End: 2018-09-04 | Stop reason: SDUPTHER

## 2018-04-04 RX ORDER — TACROLIMUS 1 MG/1
5 CAPSULE ORAL EVERY 12 HOURS
Qty: 360 CAPSULE | Refills: 11
Start: 2018-04-04 | End: 2018-04-11 | Stop reason: SDUPTHER

## 2018-04-04 NOTE — PROGRESS NOTES
Transplant Hepatology  Liver Transplant Recipient Follow-up    Transplant Date: 2017  [facility] Native Liver Dx: Alcoholic Cirrhosis    Marcie is here for follow up of her liver transplant.    ORGAN: LIVER  Whole or Partial: whole liver  Donor Type:  - brain death  CDC High Risk: no  Donor CMV Status: Negative  Donor HCV Status: Negative  Donor HBcAb: Negative  Biliary Anastomosis: end to end  Arterial Anatomy: standard  IVC reconstruction: cavaplasty piggyback  Portal vein status: patent  .    Subjective:     Interval History:  Currently, she is doing with difficulty. Current complaints include sensitivity to touch of her feet.  She also notices significant edema still in her feet and up to her knees.  Her discomfort is moderate in intensity.  There is no alleviating or precipitating factors.  She also reports increased anxiety and tremulousness.  Of note her tacrolimus level has been steadily increasing.  She also reports some numbness and tingling down the right side of her right leg from the hip to the knee.  This has been a chronic issue since transportation.  Of note she did have problems with back pain in the past.    Since last visit she was admitted to the hospital on 2 occasions.  She had rejection that was treated with 2 pulses of Solu-Medrol in addition to Thymoglobulin.  She also had issues with thyroid overload requiring diuresis.    In addition to rejection biopsies also showed steatohepatitis of unclear etiology.  Her Peth test was negative.    Review of Systems   Constitutional: Positive for activity change (improved) and appetite change (improved).   HENT: Negative.    Eyes: Negative.    Respiratory: Negative.    Cardiovascular: Positive for leg swelling.   Gastrointestinal: Positive for abdominal distention. Negative for constipation.   Genitourinary: Negative.    Musculoskeletal: Negative.    Skin: Negative.         Skin sensitivity of feet   Neurological: Positive for tremors and  numbness.   Psychiatric/Behavioral: Negative.        Objective:     Physical Exam   Constitutional: She is oriented to person, place, and time. She appears well-developed and well-nourished. No distress.   HENT:   Head: Normocephalic and atraumatic.   Mouth/Throat: Oropharynx is clear and moist. No oropharyngeal exudate.   Eyes: Conjunctivae are normal. Pupils are equal, round, and reactive to light. Right eye exhibits no discharge. Left eye exhibits no discharge. No scleral icterus.   Pulmonary/Chest: Effort normal and breath sounds normal. No respiratory distress. She has no wheezes.   Abdominal: Soft. She exhibits distension (no appreciable ascites). There is no tenderness.   Musculoskeletal: She exhibits edema (3+ BLE, warm but no erythema or lesions; slight pedal hyperpigmentation; tender to touch bilaterally).   Neurological: She is alert and oriented to person, place, and time.   Psychiatric: She has a normal mood and affect. Her behavior is normal.   Vitals reviewed.      WBC   Date Value Ref Range Status   04/02/2018 4.51 3.90 - 12.70 K/uL Final     Hemoglobin   Date Value Ref Range Status   04/02/2018 9.8 (L) 12.0 - 16.0 g/dL Final     POC Hematocrit   Date Value Ref Range Status   12/26/2017 28 (L) 36 - 54 %PCV Final     Hematocrit   Date Value Ref Range Status   04/02/2018 31.2 (L) 37.0 - 48.5 % Final     Platelets   Date Value Ref Range Status   04/02/2018 199 150 - 350 K/uL Final     BUN, Bld   Date Value Ref Range Status   04/02/2018 28 (H) 6 - 20 mg/dL Final     Creatinine   Date Value Ref Range Status   04/02/2018 0.8 0.5 - 1.4 mg/dL Final     Glucose   Date Value Ref Range Status   04/02/2018 89 70 - 110 mg/dL Final     Calcium   Date Value Ref Range Status   04/02/2018 9.4 8.7 - 10.5 mg/dL Final     Sodium   Date Value Ref Range Status   04/02/2018 143 136 - 145 mmol/L Final     Potassium   Date Value Ref Range Status   04/02/2018 3.7 3.5 - 5.1 mmol/L Final     Chloride   Date Value Ref Range  Status   04/02/2018 108 95 - 110 mmol/L Final     Magnesium   Date Value Ref Range Status   03/17/2018 1.4 (L) 1.6 - 2.6 mg/dL Final     AST   Date Value Ref Range Status   04/02/2018 10 10 - 40 U/L Final     ALT   Date Value Ref Range Status   04/02/2018 13 10 - 44 U/L Final     Alkaline Phosphatase   Date Value Ref Range Status   04/02/2018 122 55 - 135 U/L Final     Total Bilirubin   Date Value Ref Range Status   04/02/2018 0.5 0.1 - 1.0 mg/dL Final     Comment:     For infants and newborns, interpretation of results should be based  on gestational age, weight and in agreement with clinical  observations.  Premature Infant recommended reference ranges:  Up to 24 hours.............<8.0 mg/dL  Up to 48 hours............<12.0 mg/dL  3-5 days..................<15.0 mg/dL  6-29 days.................<15.0 mg/dL       Albumin   Date Value Ref Range Status   04/02/2018 3.5 3.5 - 5.2 g/dL Final     INR   Date Value Ref Range Status   03/09/2018 1.0 0.8 - 1.2 Final     Comment:     Coumadin Therapy:  2.0 - 3.0 for INR for all indicators except mechanical heart valves  and antiphospholipid syndromes which should use 2.5 - 3.5.       Lab Results   Component Value Date    TACROLIMUS 12.5 04/02/2018           Assessment/Plan:     1. Bilateral leg edema    2. Accelerated liver transplant rejection    3. Prophylactic immunotherapy    4. Liver replaced by transplant    5. Essential hypertension    6. Neuropathy      Bilateral leg edema- uncontrolled likely worsening he neuropathy  -Increase lasix to 40mg daily  -     furosemide (LASIX) 40 MG tablet; Take 1 tablet (40 mg total) by mouth once daily.  Dispense: 30 tablet; Refill: 5    Neuropathy-likely worse from increase tacro dose  -Dec tacro to 5mg bid    Accelerated liver transplant rejection-labs normalized  -Continue current meds    Prophylactic immunotherapy  -     furosemide (LASIX) 40 MG tablet; Take 1 tablet (40 mg total) by mouth once daily.  Dispense: 30 tablet; Refill:  5  -     tacrolimus (PROGRAF) 1 MG Cap; Take 5 capsules (5 mg total) by mouth every 12 (twelve) hours.  Dispense: 360 capsule; Refill: 11    Liver replaced by transplant-good allograft function    Essential hypertension- likely worsened by inc tacro  -Continue with current meds  -If not better with lower tacro dose then will change meds    RTC in 1 week with NP Bonnie to f/u LE edema and neuropathy; RTC with me in 4 weeks    Patient was seen in the liver transplant department at [facility].    Joselin Souza MD           [facility] Patient Status  Functional Status: 60% - Requires occasional assistance but is able to care for needs  Physical Capacity: Limited Mobility

## 2018-04-05 ENCOUNTER — TELEPHONE (OUTPATIENT)
Dept: TRANSPLANT | Facility: CLINIC | Age: 53
End: 2018-04-05

## 2018-04-05 NOTE — TELEPHONE ENCOUNTER
----- Message from Joselin Souza MD sent at 4/4/2018  9:39 AM CDT -----  Tacrolimus level is high decrease to 5 mg twice a day.  Repeat labs next week.

## 2018-04-05 NOTE — TELEPHONE ENCOUNTER
Called patient to make sure she was told about her medication change.  She repeated and verbalize understanding.

## 2018-04-06 ENCOUNTER — TELEPHONE (OUTPATIENT)
Dept: TRANSPLANT | Facility: CLINIC | Age: 53
End: 2018-04-06

## 2018-04-06 NOTE — TELEPHONE ENCOUNTER
----- Message from Pham Martinez sent at 4/6/2018  3:35 PM CDT -----  Contact: pt  Pt is in the beginning stages of getting a cold and would like some medication called in to [facility] [phone] before the weekend    Pt states tamiflu worked in the past for her    Pt contact [phone]

## 2018-04-06 NOTE — TELEPHONE ENCOUNTER
Returned call accessed patient's symptoms, she was complaining of starting with a cold no specific symptoms or fever.  I advised her there are OTC medications she can have in her transplant book, such as Claritin, Sudafed, Mucinex, and Robitussin.  I told her if she starts running a fever she can go to Urgent Care.

## 2018-04-09 ENCOUNTER — LAB VISIT (OUTPATIENT)
Dept: LAB | Facility: HOSPITAL | Age: 53
End: 2018-04-09
Attending: INTERNAL MEDICINE
Payer: COMMERCIAL

## 2018-04-09 DIAGNOSIS — Z94.4 LIVER REPLACED BY TRANSPLANT: ICD-10-CM

## 2018-04-09 LAB
ALBUMIN SERPL BCP-MCNC: 4.1 G/DL
ALP SERPL-CCNC: 111 U/L
ALT SERPL W/O P-5'-P-CCNC: 12 U/L
ANION GAP SERPL CALC-SCNC: 9 MMOL/L
AST SERPL-CCNC: 11 U/L
BASOPHILS # BLD AUTO: 0.01 K/UL
BASOPHILS NFR BLD: 0.2 %
BILIRUB DIRECT SERPL-MCNC: 0.3 MG/DL
BILIRUB SERPL-MCNC: 0.5 MG/DL
BUN SERPL-MCNC: 26 MG/DL
CALCIUM SERPL-MCNC: 9.9 MG/DL
CHLORIDE SERPL-SCNC: 106 MMOL/L
CO2 SERPL-SCNC: 29 MMOL/L
CREAT SERPL-MCNC: 0.9 MG/DL
DIFFERENTIAL METHOD: ABNORMAL
EOSINOPHIL # BLD AUTO: 0 K/UL
EOSINOPHIL NFR BLD: 0.9 %
ERYTHROCYTE [DISTWIDTH] IN BLOOD BY AUTOMATED COUNT: 17.2 %
EST. GFR  (AFRICAN AMERICAN): >60 ML/MIN/1.73 M^2
EST. GFR  (NON AFRICAN AMERICAN): >60 ML/MIN/1.73 M^2
GLUCOSE SERPL-MCNC: 92 MG/DL
HCT VFR BLD AUTO: 33.5 %
HGB BLD-MCNC: 10.5 G/DL
LYMPHOCYTES # BLD AUTO: 0.9 K/UL
LYMPHOCYTES NFR BLD: 20.6 %
MCH RBC QN AUTO: 27.1 PG
MCHC RBC AUTO-ENTMCNC: 31.3 G/DL
MCV RBC AUTO: 86 FL
MONOCYTES # BLD AUTO: 0.4 K/UL
MONOCYTES NFR BLD: 8.7 %
NEUTROPHILS # BLD AUTO: 3.1 K/UL
NEUTROPHILS NFR BLD: 69.6 %
PLATELET # BLD AUTO: 192 K/UL
PMV BLD AUTO: 9.8 FL
POTASSIUM SERPL-SCNC: 4.4 MMOL/L
PROT SERPL-MCNC: 7 G/DL
RBC # BLD AUTO: 3.88 M/UL
SODIUM SERPL-SCNC: 144 MMOL/L
WBC # BLD AUTO: 4.46 K/UL

## 2018-04-09 PROCEDURE — 85025 COMPLETE CBC W/AUTO DIFF WBC: CPT | Mod: PO

## 2018-04-09 PROCEDURE — 80053 COMPREHEN METABOLIC PANEL: CPT | Mod: PO

## 2018-04-09 PROCEDURE — 82248 BILIRUBIN DIRECT: CPT | Mod: PO

## 2018-04-09 PROCEDURE — 80197 ASSAY OF TACROLIMUS: CPT

## 2018-04-09 PROCEDURE — 36415 COLL VENOUS BLD VENIPUNCTURE: CPT | Mod: PO

## 2018-04-10 LAB — TACROLIMUS BLD-MCNC: 12 NG/ML

## 2018-04-11 RX ORDER — TACROLIMUS 1 MG/1
4 CAPSULE ORAL EVERY 12 HOURS
Qty: 240 CAPSULE | Refills: 11 | Status: SHIPPED | OUTPATIENT
Start: 2018-04-11 | End: 2018-04-18 | Stop reason: SDUPTHER

## 2018-04-12 ENCOUNTER — OFFICE VISIT (OUTPATIENT)
Dept: GASTROENTEROLOGY | Facility: CLINIC | Age: 53
End: 2018-04-12
Payer: COMMERCIAL

## 2018-04-12 VITALS
HEIGHT: 62 IN | BODY MASS INDEX: 26.45 KG/M2 | SYSTOLIC BLOOD PRESSURE: 132 MMHG | DIASTOLIC BLOOD PRESSURE: 84 MMHG | WEIGHT: 143.75 LBS | HEART RATE: 96 BPM

## 2018-04-12 DIAGNOSIS — R60.0 LOCALIZED EDEMA: ICD-10-CM

## 2018-04-12 DIAGNOSIS — M79.662 PAIN IN BOTH LOWER LEGS: ICD-10-CM

## 2018-04-12 DIAGNOSIS — T86.41: ICD-10-CM

## 2018-04-12 DIAGNOSIS — Z94.4 LIVER REPLACED BY TRANSPLANT: Primary | ICD-10-CM

## 2018-04-12 DIAGNOSIS — Z29.89 PROPHYLACTIC IMMUNOTHERAPY: ICD-10-CM

## 2018-04-12 DIAGNOSIS — M79.661 PAIN IN BOTH LOWER LEGS: ICD-10-CM

## 2018-04-12 PROCEDURE — 99214 OFFICE O/P EST MOD 30 MIN: CPT | Mod: S$GLB,,, | Performed by: NURSE PRACTITIONER

## 2018-04-12 PROCEDURE — 99999 PR PBB SHADOW E&M-EST. PATIENT-LVL III: CPT | Mod: PBBFAC,,, | Performed by: NURSE PRACTITIONER

## 2018-04-12 PROCEDURE — 99213 OFFICE O/P EST LOW 20 MIN: CPT | Mod: PBBFAC,PO | Performed by: NURSE PRACTITIONER

## 2018-04-12 NOTE — PROGRESS NOTES
Clinic Follow Up:  [facility] Follow Up Note    Reason for Follow Up:  The primary encounter diagnosis was Liver replaced by transplant. Diagnoses of Accelerated liver transplant rejection, Prophylactic immunotherapy, Localized edema, and Pain in both lower legs were also pertinent to this visit.    PCP: Milton Ferrer       HPI:  This is a 52 y.o. female here for follow up of the above  Pt was seen by Dr. Souza one week ago for worsening BLE with neuropathic pain.  She states that since increasing Lasix, the swelling has significantly improved.  With that improvement, the pain has also improved some  Chart review shows a 6 pound weight loss since her last visit.       Review of Systems   Constitutional: Negative for chills, fever, malaise/fatigue and weight loss.   Respiratory: Negative for cough.    Cardiovascular: Negative for chest pain.   Gastrointestinal:        Per HPI   Musculoskeletal: Negative for myalgias.   Skin: Negative for itching and rash.   Neurological: Negative for headaches.   Psychiatric/Behavioral: The patient is not nervous/anxious.        Medical History:  Past Medical History:   Diagnosis Date    Alcohol abuse     Alcoholic hepatitis     Anemia of chronic disease     Coagulopathy     Encounter for blood transfusion     End stage liver disease     Hypertension     Hyponatremia     Liver cirrhosis     Liver transplant candidate     Obesity (BMI 30-39.9) 1/5/2016       Surgical History:   Past Surgical History:   Procedure Laterality Date    BREAST BIOPSY      CHOLECYSTECTOMY      COLONOSCOPY N/A 12/1/2017    Procedure: COLONOSCOPY;  Surgeon: Filemon Fuentes MD;  Location: [address]);  Service: Endoscopy;  Laterality: N/A;    COLONOSCOPY N/A 12/5/2017    Procedure: COLONOSCOPY;  Surgeon: José Chavira MD;  Location: [facility] ([address] [place] [address]);  Service: Endoscopy;  Laterality: N/A;    HYSTERECTOMY      LIVER TRANSPLANT  12/2017       Family  History:   Family History   Problem Relation Age of Onset    Hypertension Mother     Hypertension Father     Diabetes Father     Diabetes Sister     Depression Maternal Grandfather     Breast cancer Paternal Aunt        Social History:   Social History   Substance Use Topics    Smoking status: Never Smoker    Smokeless tobacco: Never Used    Alcohol use Yes      Comment: Currently admitted to inpatient rehab 7/2017       Allergies: Reviewed    Home Medications:  Current Outpatient Prescriptions on File Prior to Visit   Medication Sig Dispense Refill    aspirin (ECOTRIN) 81 MG EC tablet Take 1 tablet (81 mg total) by mouth once daily. 30 tablet 11    atorvastatin (LIPITOR) 40 MG tablet Take 1 tablet (40 mg total) by mouth once daily. 90 tablet 3    bisacodyl (DULCOLAX) 5 mg EC tablet Take 1-2 tablets (5-10 mg total) by mouth daily as needed for Constipation. 30 tablet 0    cyclobenzaprine (FLEXERIL) 5 MG tablet Take 1 tablet (5 mg total) by mouth 3 (three) times daily as needed for Muscle spasms. 30 tablet 1    famotidine (PEPCID) 20 MG tablet Take 1 tablet (20 mg total) by mouth every evening. 30 tablet 11    furosemide (LASIX) 40 MG tablet Take 1 tablet (40 mg total) by mouth once daily. 30 tablet 5    gabapentin (NEURONTIN) 300 MG capsule Take 1 capsule (300 mg total) by mouth 2 (two) times daily. 60 capsule 11    magnesium oxide (MAG-OX) 400 mg tablet Take 2 tablets (800 mg total) by mouth 2 (two) times daily. 120 tablet 2    mycophenolate (CELLCEPT) 250 mg Cap Take 4 capsules (1,000 mg total) by mouth 2 (two) times daily. 240 capsule 11    NIFEdipine (PROCARDIA-XL) 60 MG (OSM) 24 hr tablet Take 1 tablet (60 mg total) by mouth once daily. 30 tablet 11    nystatin (MYCOSTATIN) 100,000 unit/mL suspension Take 5 mLs (500,000 Units total) by mouth 3 (three) times daily after meals. STOP 4/17/18 473 mL 0    potassium chloride (MICRO-K) 10 MEQ CpSR Take 4 capsules (40 mEq total) by mouth once  "daily. (Patient taking differently: Take 40 mEq by mouth 2 (two) times daily. ) 80 capsule 0    predniSONE (DELTASONE) 10 MG tablet Take PO Daily: 20mg 3/18-3/24, 15mg 3/25-3/31, 10mg 4/1-4/7, 5mg 4/8-4/14, Stop 4/15 60 tablet 0    tacrolimus (PROGRAF) 1 MG Cap Take 4 capsules (4 mg total) by mouth every 12 (twelve) hours. 240 capsule 11    traZODone (DESYREL) 50 MG tablet Take 0.5 tablets (25 mg total) by mouth every evening. 15 tablet 11    valGANciclovir (VALCYTE) 450 mg Tab Take 1 tablet (450 mg total) by mouth once daily. Stop 6/15/18 30 tablet 2    ondansetron (ZOFRAN-ODT) 4 MG TbDL DISSOLVE 1 TABLET BY MOUTH EVERY 8 HOURS AS NEEDED FOR NAUSEA AND VOMITTING 12 tablet 2     No current facility-administered medications on file prior to visit.        Physical Exam:  Vital Signs:  /84   Pulse 96   Ht 5' 2" (1.575 m)   Wt 65.2 kg (143 lb 11.8 oz)   LMP 01/01/1985 (Approximate) Comment: 1985  BMI 26.29 kg/m²   Body mass index is 26.29 kg/m².  Physical Exam   Constitutional: She is oriented to person, place, and time. She appears well-developed and well-nourished.   HENT:   Head: Normocephalic.   Eyes: No scleral icterus.   Neck: Normal range of motion.   Cardiovascular: Normal rate and regular rhythm.    Pulmonary/Chest: Effort normal and breath sounds normal.   Abdominal: Soft. Bowel sounds are normal. She exhibits no distension. There is no tenderness.   Musculoskeletal: Normal range of motion.   Neurological: She is alert and oriented to person, place, and time.   Skin: Skin is warm and dry.   Psychiatric: She has a normal mood and affect.   Vitals reviewed.      Labs: Pertinent labs reviewed.    Assessment:  1. Liver replaced by transplant    2. Accelerated liver transplant rejection    3. Prophylactic immunotherapy    4. Localized edema    5. Pain in both lower legs        Recommendations:  - Improved  - Continue current medications  - Labs as previously planned on Monday.   - F/U with Dr. Souza " as planned.     Liver replaced by transplant    Accelerated liver transplant rejection    Prophylactic immunotherapy    Localized edema    Pain in both lower legs        Patient was seen in the liver transplant department at [facility].

## 2018-04-16 ENCOUNTER — LAB VISIT (OUTPATIENT)
Dept: LAB | Facility: HOSPITAL | Age: 53
End: 2018-04-16
Attending: INTERNAL MEDICINE
Payer: COMMERCIAL

## 2018-04-16 DIAGNOSIS — Z94.4 LIVER REPLACED BY TRANSPLANT: ICD-10-CM

## 2018-04-16 LAB
ALBUMIN SERPL BCP-MCNC: 4.1 G/DL
ALP SERPL-CCNC: 117 U/L
ALT SERPL W/O P-5'-P-CCNC: 22 U/L
ANION GAP SERPL CALC-SCNC: 9 MMOL/L
AST SERPL-CCNC: 18 U/L
BASOPHILS # BLD AUTO: 0.02 K/UL
BASOPHILS NFR BLD: 0.5 %
BILIRUB DIRECT SERPL-MCNC: 0.3 MG/DL
BILIRUB SERPL-MCNC: 0.5 MG/DL
BUN SERPL-MCNC: 21 MG/DL
CALCIUM SERPL-MCNC: 9.9 MG/DL
CHLORIDE SERPL-SCNC: 106 MMOL/L
CO2 SERPL-SCNC: 28 MMOL/L
CREAT SERPL-MCNC: 0.9 MG/DL
DIFFERENTIAL METHOD: ABNORMAL
EOSINOPHIL # BLD AUTO: 0 K/UL
EOSINOPHIL NFR BLD: 0.7 %
ERYTHROCYTE [DISTWIDTH] IN BLOOD BY AUTOMATED COUNT: 16.7 %
EST. GFR  (AFRICAN AMERICAN): >60 ML/MIN/1.73 M^2
EST. GFR  (NON AFRICAN AMERICAN): >60 ML/MIN/1.73 M^2
GLUCOSE SERPL-MCNC: 92 MG/DL
HCT VFR BLD AUTO: 34.8 %
HGB BLD-MCNC: 11 G/DL
LYMPHOCYTES # BLD AUTO: 1 K/UL
LYMPHOCYTES NFR BLD: 24.2 %
MCH RBC QN AUTO: 26.8 PG
MCHC RBC AUTO-ENTMCNC: 31.6 G/DL
MCV RBC AUTO: 85 FL
MONOCYTES # BLD AUTO: 0.1 K/UL
MONOCYTES NFR BLD: 1.9 %
NEUTROPHILS # BLD AUTO: 3.1 K/UL
NEUTROPHILS NFR BLD: 72.7 %
PLATELET # BLD AUTO: 188 K/UL
PMV BLD AUTO: 9.5 FL
POTASSIUM SERPL-SCNC: 4 MMOL/L
PROT SERPL-MCNC: 7.1 G/DL
RBC # BLD AUTO: 4.11 M/UL
SODIUM SERPL-SCNC: 143 MMOL/L
WBC # BLD AUTO: 4.29 K/UL

## 2018-04-16 PROCEDURE — 80197 ASSAY OF TACROLIMUS: CPT

## 2018-04-16 PROCEDURE — 82248 BILIRUBIN DIRECT: CPT | Mod: PO

## 2018-04-16 PROCEDURE — 80053 COMPREHEN METABOLIC PANEL: CPT | Mod: PO

## 2018-04-16 PROCEDURE — 36415 COLL VENOUS BLD VENIPUNCTURE: CPT | Mod: PO

## 2018-04-16 PROCEDURE — 85025 COMPLETE CBC W/AUTO DIFF WBC: CPT | Mod: PO

## 2018-04-17 LAB — TACROLIMUS BLD-MCNC: 10.8 NG/ML

## 2018-04-18 RX ORDER — TACROLIMUS 1 MG/1
CAPSULE ORAL
Qty: 210 CAPSULE | Refills: 11 | Status: SHIPPED | OUTPATIENT
Start: 2018-04-18 | End: 2018-05-16 | Stop reason: SDUPTHER

## 2018-04-18 NOTE — TELEPHONE ENCOUNTER
----- Message from Joselin Souza MD sent at 4/18/2018 10:43 AM CDT -----  Labs okay repeat next week to trend them will try to space out.

## 2018-04-18 NOTE — TELEPHONE ENCOUNTER
Dr. Souza reviewed your labs.  Called patient to let her know Dr Souza sent me a message to decrease Prograf to 4 mg in the morning and 3 mg in the evening because it was causing her to shake.  She will repeat labs next week.

## 2018-04-23 ENCOUNTER — LAB VISIT (OUTPATIENT)
Dept: LAB | Facility: HOSPITAL | Age: 53
End: 2018-04-23
Attending: INTERNAL MEDICINE
Payer: MEDICAID

## 2018-04-23 DIAGNOSIS — Z94.4 LIVER REPLACED BY TRANSPLANT: ICD-10-CM

## 2018-04-23 LAB
ALBUMIN SERPL BCP-MCNC: 4 G/DL
ALP SERPL-CCNC: 135 U/L
ALT SERPL W/O P-5'-P-CCNC: 39 U/L
ANION GAP SERPL CALC-SCNC: 9 MMOL/L
AST SERPL-CCNC: 30 U/L
BASOPHILS # BLD AUTO: 0.01 K/UL
BASOPHILS NFR BLD: 0.3 %
BILIRUB DIRECT SERPL-MCNC: 0.3 MG/DL
BILIRUB SERPL-MCNC: 0.6 MG/DL
BUN SERPL-MCNC: 15 MG/DL
CALCIUM SERPL-MCNC: 10.2 MG/DL
CHLORIDE SERPL-SCNC: 106 MMOL/L
CO2 SERPL-SCNC: 28 MMOL/L
CREAT SERPL-MCNC: 0.8 MG/DL
DIFFERENTIAL METHOD: ABNORMAL
EOSINOPHIL # BLD AUTO: 0.1 K/UL
EOSINOPHIL NFR BLD: 1.5 %
ERYTHROCYTE [DISTWIDTH] IN BLOOD BY AUTOMATED COUNT: 16.4 %
EST. GFR  (AFRICAN AMERICAN): >60 ML/MIN/1.73 M^2
EST. GFR  (NON AFRICAN AMERICAN): >60 ML/MIN/1.73 M^2
GLUCOSE SERPL-MCNC: 90 MG/DL
HCT VFR BLD AUTO: 36 %
HGB BLD-MCNC: 11.4 G/DL
LYMPHOCYTES # BLD AUTO: 0.8 K/UL
LYMPHOCYTES NFR BLD: 19.7 %
MCH RBC QN AUTO: 26.6 PG
MCHC RBC AUTO-ENTMCNC: 31.7 G/DL
MCV RBC AUTO: 84 FL
MONOCYTES # BLD AUTO: 0.2 K/UL
MONOCYTES NFR BLD: 4.6 %
NEUTROPHILS # BLD AUTO: 2.9 K/UL
NEUTROPHILS NFR BLD: 73.9 %
PLATELET # BLD AUTO: 203 K/UL
PMV BLD AUTO: 10.7 FL
POTASSIUM SERPL-SCNC: 4.1 MMOL/L
PROT SERPL-MCNC: 7.1 G/DL
RBC # BLD AUTO: 4.29 M/UL
SODIUM SERPL-SCNC: 143 MMOL/L
WBC # BLD AUTO: 3.91 K/UL

## 2018-04-23 PROCEDURE — 82248 BILIRUBIN DIRECT: CPT | Mod: PO

## 2018-04-23 PROCEDURE — 36415 COLL VENOUS BLD VENIPUNCTURE: CPT | Mod: PO

## 2018-04-23 PROCEDURE — 80053 COMPREHEN METABOLIC PANEL: CPT | Mod: PO

## 2018-04-23 PROCEDURE — 80197 ASSAY OF TACROLIMUS: CPT

## 2018-04-23 PROCEDURE — 85025 COMPLETE CBC W/AUTO DIFF WBC: CPT | Mod: PO

## 2018-04-24 LAB — TACROLIMUS BLD-MCNC: 8.2 NG/ML

## 2018-04-25 ENCOUNTER — TELEPHONE (OUTPATIENT)
Dept: TRANSPLANT | Facility: CLINIC | Age: 53
End: 2018-04-25

## 2018-04-25 NOTE — TELEPHONE ENCOUNTER
Dr. Souza reviewed your labs.  Called patient to let her know live test normal but are trending up, no medication changes.  The doctor would like you to repeat labs on Monday.

## 2018-04-25 NOTE — TELEPHONE ENCOUNTER
----- Message from Joselin Souza MD sent at 4/24/2018 11:09 PM CDT -----  Liver tests within normal range but seem to be trending up for unclear reasons.  Patient is on adequate level of immunosuppression.  Repeat labs next week.

## 2018-04-26 ENCOUNTER — OFFICE VISIT (OUTPATIENT)
Dept: GASTROENTEROLOGY | Facility: CLINIC | Age: 53
End: 2018-04-26
Payer: COMMERCIAL

## 2018-04-26 VITALS
BODY MASS INDEX: 26.37 KG/M2 | DIASTOLIC BLOOD PRESSURE: 64 MMHG | SYSTOLIC BLOOD PRESSURE: 112 MMHG | HEART RATE: 72 BPM | HEIGHT: 62 IN | WEIGHT: 143.31 LBS

## 2018-04-26 DIAGNOSIS — Z94.4 LIVER TRANSPLANTED: ICD-10-CM

## 2018-04-26 DIAGNOSIS — L65.9 HAIR LOSS: ICD-10-CM

## 2018-04-26 DIAGNOSIS — D84.9 IMMUNOSUPPRESSION: ICD-10-CM

## 2018-04-26 DIAGNOSIS — G62.9 NEUROPATHY: Primary | ICD-10-CM

## 2018-04-26 PROBLEM — R60.9 EDEMA: Status: RESOLVED | Noted: 2018-03-09 | Resolved: 2018-04-26

## 2018-04-26 PROCEDURE — 99213 OFFICE O/P EST LOW 20 MIN: CPT | Mod: PBBFAC,PO | Performed by: INTERNAL MEDICINE

## 2018-04-26 PROCEDURE — 99214 OFFICE O/P EST MOD 30 MIN: CPT | Mod: S$GLB,,, | Performed by: INTERNAL MEDICINE

## 2018-04-26 PROCEDURE — 99999 PR PBB SHADOW E&M-EST. PATIENT-LVL III: CPT | Mod: PBBFAC,,, | Performed by: INTERNAL MEDICINE

## 2018-04-26 NOTE — PROGRESS NOTES
Transplant Hepatology  Liver Transplant Recipient Follow-up    Transplant Date: 2017  UNOS Native Liver Dx: Alcoholic Cirrhosis    Marcie is here for follow up of her liver transplant.    ORGAN: LIVER  Whole or Partial: whole liver  Donor Type:  - brain death  CDC High Risk: no  Donor CMV Status: Negative  Donor HCV Status: Negative  Donor HBcAb: Negative  Biliary Anastomosis: end to end  Arterial Anatomy: standard  IVC reconstruction: cavaplasty piggyback  Portal vein status: patent  .    Subjective:     Interval History:  Currently, she is doing adequately. Current complaints include persistent concerns related to hair loss.  She also has consistent issues with paresthesias on her right lateral thigh as well as over her left foot.  This is also been a chronic issue.  Overall though symptoms are slightly improving but they can be mild to moderate in intensity.  Usually improve with positional changes.  She would like to see if they continue to get better before increasing gabapentin.  Otherwise she reports a good appetite.  Her lower extension edema remains controlled.    Review of Systems   HENT: Negative.    Eyes: Negative.    Respiratory: Negative.    Cardiovascular: Negative.    Gastrointestinal: Negative.    Genitourinary: Negative.    Musculoskeletal: Negative.    Skin: Negative.         Hair loss   Neurological: Positive for weakness (Decreased exercise tolerance) and numbness (tingling).   Psychiatric/Behavioral: Negative.        Objective:     Physical Exam   Constitutional: She is oriented to person, place, and time. No distress.   HENT:   Head: Normocephalic and atraumatic.   Mouth/Throat: Oropharynx is clear and moist. No oropharyngeal exudate.   Eyes: Conjunctivae are normal. Pupils are equal, round, and reactive to light. Right eye exhibits no discharge. Left eye exhibits no discharge. No scleral icterus.   Pulmonary/Chest: Effort normal and breath sounds normal. No respiratory distress.  She has no wheezes.   Abdominal: Soft. She exhibits no distension. There is no tenderness.   Musculoskeletal: She exhibits no edema.   Neurological: She is alert and oriented to person, place, and time.   Psychiatric: She has a normal mood and affect. Her behavior is normal.   Vitals reviewed.      WBC   Date Value Ref Range Status   04/23/2018 3.91 3.90 - 12.70 K/uL Final     Hemoglobin   Date Value Ref Range Status   04/23/2018 11.4 (L) 12.0 - 16.0 g/dL Final     POC Hematocrit   Date Value Ref Range Status   12/26/2017 28 (L) 36 - 54 %PCV Final     Hematocrit   Date Value Ref Range Status   04/23/2018 36.0 (L) 37.0 - 48.5 % Final     Platelets   Date Value Ref Range Status   04/23/2018 203 150 - 350 K/uL Final     BUN, Bld   Date Value Ref Range Status   04/23/2018 15 6 - 20 mg/dL Final     Creatinine   Date Value Ref Range Status   04/23/2018 0.8 0.5 - 1.4 mg/dL Final     Glucose   Date Value Ref Range Status   04/23/2018 90 70 - 110 mg/dL Final     Calcium   Date Value Ref Range Status   04/23/2018 10.2 8.7 - 10.5 mg/dL Final     Sodium   Date Value Ref Range Status   04/23/2018 143 136 - 145 mmol/L Final     Potassium   Date Value Ref Range Status   04/23/2018 4.1 3.5 - 5.1 mmol/L Final     Chloride   Date Value Ref Range Status   04/23/2018 106 95 - 110 mmol/L Final     Magnesium   Date Value Ref Range Status   03/17/2018 1.4 (L) 1.6 - 2.6 mg/dL Final     AST   Date Value Ref Range Status   04/23/2018 30 10 - 40 U/L Final     ALT   Date Value Ref Range Status   04/23/2018 39 10 - 44 U/L Final     Alkaline Phosphatase   Date Value Ref Range Status   04/23/2018 135 55 - 135 U/L Final     Total Bilirubin   Date Value Ref Range Status   04/23/2018 0.6 0.1 - 1.0 mg/dL Final     Comment:     For infants and newborns, interpretation of results should be based  on gestational age, weight and in agreement with clinical  observations.  Premature Infant recommended reference ranges:  Up to 24 hours.............<8.0  mg/dL  Up to 48 hours............<12.0 mg/dL  3-5 days..................<15.0 mg/dL  6-29 days.................<15.0 mg/dL       Albumin   Date Value Ref Range Status   04/23/2018 4.0 3.5 - 5.2 g/dL Final     INR   Date Value Ref Range Status   03/09/2018 1.0 0.8 - 1.2 Final     Comment:     Coumadin Therapy:  2.0 - 3.0 for INR for all indicators except mechanical heart valves  and antiphospholipid syndromes which should use 2.5 - 3.5.       Lab Results   Component Value Date    TACROLIMUS 8.2 04/23/2018           Assessment/Plan:     1. Neuropathy    2. Hair loss    3. Immunosuppression    4. Liver transplanted      Neuropathy-Suspect thigh and foot issues related to neuropathy either from back, deconditioning or medication  -Encouraged her to work aggressively with physical therapy  -We'll hold on increasing gabapentin for now    Hair loss-likely related to deconditioning, previous malnutrition and/or medication  -Advised she can try biotin 5-10K daily    Immunosuppression  -Continue current immunosuppression if liver tests continue to trend up may have to add back some prednisone    Liver transplanted-good allograft function, resolved recent acute rejection although AST and ALT are trending up  -Monitor closely with repeat labs next week    Return to clinic in 6-8 weeks    Patient was seen in the liver transplant department at [facility].      Joselin Souza MD           [facility] Patient Status  Functional Status: 70% - Cares for self: unable to carry on normal activity or active work  Physical Capacity: Limited Mobility

## 2018-04-30 ENCOUNTER — LAB VISIT (OUTPATIENT)
Dept: LAB | Facility: HOSPITAL | Age: 53
End: 2018-04-30
Attending: INTERNAL MEDICINE
Payer: COMMERCIAL

## 2018-04-30 DIAGNOSIS — Z94.4 LIVER REPLACED BY TRANSPLANT: ICD-10-CM

## 2018-04-30 DIAGNOSIS — Z94.4 LIVER REPLACED BY TRANSPLANT: Primary | ICD-10-CM

## 2018-04-30 LAB
ALBUMIN SERPL BCP-MCNC: 4 G/DL
ALP SERPL-CCNC: 149 U/L
ALT SERPL W/O P-5'-P-CCNC: 27 U/L
ANION GAP SERPL CALC-SCNC: 11 MMOL/L
ANISOCYTOSIS BLD QL SMEAR: SLIGHT
AST SERPL-CCNC: 24 U/L
BASOPHILS # BLD AUTO: 0.06 K/UL
BASOPHILS NFR BLD: 1.6 %
BILIRUB DIRECT SERPL-MCNC: 0.4 MG/DL
BILIRUB SERPL-MCNC: 0.9 MG/DL
BUN SERPL-MCNC: 22 MG/DL
CALCIUM SERPL-MCNC: 10.5 MG/DL
CHLORIDE SERPL-SCNC: 105 MMOL/L
CO2 SERPL-SCNC: 28 MMOL/L
CREAT SERPL-MCNC: 0.9 MG/DL
DIFFERENTIAL METHOD: ABNORMAL
EOSINOPHIL # BLD AUTO: 0.1 K/UL
EOSINOPHIL NFR BLD: 1.9 %
ERYTHROCYTE [DISTWIDTH] IN BLOOD BY AUTOMATED COUNT: 15.9 %
EST. GFR  (AFRICAN AMERICAN): >60 ML/MIN/1.73 M^2
EST. GFR  (NON AFRICAN AMERICAN): >60 ML/MIN/1.73 M^2
GLUCOSE SERPL-MCNC: 84 MG/DL
HCT VFR BLD AUTO: 38.1 %
HGB BLD-MCNC: 11.8 G/DL
IMM GRANULOCYTES # BLD AUTO: 0.09 K/UL
IMM GRANULOCYTES NFR BLD AUTO: 2.4 %
LYMPHOCYTES # BLD AUTO: 0.8 K/UL
LYMPHOCYTES NFR BLD: 20.9 %
MCH RBC QN AUTO: 26.3 PG
MCHC RBC AUTO-ENTMCNC: 31 G/DL
MCV RBC AUTO: 85 FL
MONOCYTES # BLD AUTO: 0.3 K/UL
MONOCYTES NFR BLD: 8 %
NEUTROPHILS # BLD AUTO: 2.4 K/UL
NEUTROPHILS NFR BLD: 65.2 %
NRBC BLD-RTO: 0 /100 WBC
OVALOCYTES BLD QL SMEAR: ABNORMAL
PLATELET # BLD AUTO: 166 K/UL
PLATELET BLD QL SMEAR: ABNORMAL
PMV BLD AUTO: 11.9 FL
POIKILOCYTOSIS BLD QL SMEAR: SLIGHT
POLYCHROMASIA BLD QL SMEAR: ABNORMAL
POTASSIUM SERPL-SCNC: 3.8 MMOL/L
PROT SERPL-MCNC: 7.3 G/DL
RBC # BLD AUTO: 4.48 M/UL
SODIUM SERPL-SCNC: 144 MMOL/L
WBC # BLD AUTO: 3.74 K/UL

## 2018-04-30 PROCEDURE — 82248 BILIRUBIN DIRECT: CPT

## 2018-04-30 PROCEDURE — 80197 ASSAY OF TACROLIMUS: CPT

## 2018-04-30 PROCEDURE — 85025 COMPLETE CBC W/AUTO DIFF WBC: CPT

## 2018-04-30 PROCEDURE — 80053 COMPREHEN METABOLIC PANEL: CPT

## 2018-05-01 LAB
CMV DNA SERPL NAA+PROBE-ACNC: NORMAL IU/ML
TACROLIMUS BLD-MCNC: 8.6 NG/ML

## 2018-05-02 ENCOUNTER — CLINICAL SUPPORT (OUTPATIENT)
Dept: REHABILITATION | Facility: HOSPITAL | Age: 53
End: 2018-05-02
Payer: COMMERCIAL

## 2018-05-02 DIAGNOSIS — M54.32 SCIATICA, LEFT SIDE: ICD-10-CM

## 2018-05-02 DIAGNOSIS — R53.81 PHYSICAL DECONDITIONING: Primary | ICD-10-CM

## 2018-05-02 DIAGNOSIS — R53.1 WEAKNESS: ICD-10-CM

## 2018-05-02 PROCEDURE — 97164 PT RE-EVAL EST PLAN CARE: CPT

## 2018-05-02 PROCEDURE — 97110 THERAPEUTIC EXERCISES: CPT

## 2018-05-02 RX ORDER — POTASSIUM CHLORIDE 750 MG/1
40 CAPSULE, EXTENDED RELEASE ORAL DAILY
Qty: 120 CAPSULE | Refills: 2 | Status: SHIPPED | OUTPATIENT
Start: 2018-05-02 | End: 2018-08-07 | Stop reason: SDUPTHER

## 2018-05-02 NOTE — PROGRESS NOTES
PHYSICAL THERAPY RE-EVALUATION    Referring Provider:  Devon Robles    Diagnosis:         ICD-10-CM ICD-9-CM    1. Physical deconditioning R53.81 799.3    2. Sciatica, left side M54.32 724.3    3. Weakness R53.1 780.79    3.      Sciatica, left side   M54.32    Orders:  Evaluate and Treat    Date of Initial Evaluation: 1/16/18    Visit # 6 of 20    BACKGROUND:  Patient is a 53 y/o female 2 months s/p liver transplant due to alcoholic cirrhosis.  She has previously been seen post-operatively at the [facility] facility in [place] for 2 visits for this condition.  The patient reports that her stomach and legs are always hurting. The patient reports that the pain in her legs feel like burning pins and needles.  She also reports that her L LE feels heavy at times and starts dragging. The patient has a complicated medical history including liver failure, anemia, HTN, leukopenia, and history of alcohol abuse.    Subjective 5/2/18 - Patient reports that she's been feeling much better lately.  She states that she was hospitalized for over a week and received a treatment for 8 days that helped with her swelling and weakness.    OBJECTIVE:  Updated 5/2    Gait: Pt ambulates with mild gait deviations at this time.     ROM:  WNL bilateral LEs     Palpation:  Minimal TTP in left foot at this time but experiencing parasthesia in L UE and L LE     Strength:    Strength Right Left   Hip Flexion 3/5 3-/5   Knee Extension 3+/5 3-/5   Ankle DF 4/5 3/5   Knee Flexors 4/5 3/5      Function:  Patient reports 49% impairment on the Lower Extremity Functional Scale (LEFS).     Short Term GOALS: 3 weeks.   1. Pt will walk 150 feet with or without AD independently without need for rest break in order to demonstrate increased independence and endurance for functional mobility around the home  2. Pt will increase strength to 3+/5 or greater in bilateral LEs.  3. Pt will perform 45 min of therex without need for rest break in order to show  increased endurance to standing position required for household activities.  4. Pt will be I with HEP.     Long Term GOALS: 8-10  weeks. .  1. Pt will ambulate 300 feet independently without rest break in order to show increased independence and endurance to functional mobility in home and community  2. Pt will increase strength to >/= 4/5 MMT grade in BLE in order to perform ADLs without difficulty.  3. Pt will perform 20 min of standing therex without need for rest break in order to show increased endurance to standing position required for cooking at home  3. Pt's goal:  Independent with all ADLs and mobility, be able to cook for family       TREATMENT PROVIDED:  -Re-evaluation - 10 min  -Therapeutic Exercise:  30 min   - Sciatic n glides with assist - 20x   - Bridging - 30x   - DKTC/LTRs with ball - 30x   - Hip abd/add with resistance- 30x   - Nu step - 5'  -Education/HEP: Pt education on condition and how to decrease pain in her LEs    ASSESSMENT:   Patient with improved activity tolerance and overall endurance.  Patient still with lingering symptoms in her L LE and UE as well as strength deficits.  She would benefit from PT to improve her functional strength and mobility.    PLAN:  Patient will benefit from physical therapy (2-3) x/week for (4-6) weeks including therapeutic exercise, functional activities, neuromuscular re-education, gait training, modalities, and patient education.

## 2018-05-04 ENCOUNTER — TELEPHONE (OUTPATIENT)
Dept: TRANSPLANT | Facility: CLINIC | Age: 53
End: 2018-05-04

## 2018-05-04 DIAGNOSIS — Z94.4 LIVER REPLACED BY TRANSPLANT: Primary | ICD-10-CM

## 2018-05-04 NOTE — TELEPHONE ENCOUNTER
----- Message from Joselin Souza MD sent at 5/2/2018  4:25 PM CDT -----  Reviewed, nothing to do

## 2018-05-04 NOTE — TELEPHONE ENCOUNTER
Dr. Souza reviewed your labs.  Continue routine labs no changes needed.  Letter sent for next lab appointment 05/14/18

## 2018-05-07 ENCOUNTER — LAB VISIT (OUTPATIENT)
Dept: LAB | Facility: HOSPITAL | Age: 53
End: 2018-05-07
Attending: INTERNAL MEDICINE
Payer: COMMERCIAL

## 2018-05-07 DIAGNOSIS — Z94.4 LIVER REPLACED BY TRANSPLANT: ICD-10-CM

## 2018-05-07 LAB
ALBUMIN SERPL BCP-MCNC: 3.8 G/DL
ALP SERPL-CCNC: 149 U/L
ALT SERPL W/O P-5'-P-CCNC: 35 U/L
ANION GAP SERPL CALC-SCNC: 11 MMOL/L
AST SERPL-CCNC: 32 U/L
BASOPHILS # BLD AUTO: 0.03 K/UL
BASOPHILS NFR BLD: 1.1 %
BILIRUB SERPL-MCNC: 0.9 MG/DL
BUN SERPL-MCNC: 20 MG/DL
CALCIUM SERPL-MCNC: 10.3 MG/DL
CHLORIDE SERPL-SCNC: 105 MMOL/L
CO2 SERPL-SCNC: 27 MMOL/L
CREAT SERPL-MCNC: 0.9 MG/DL
DIFFERENTIAL METHOD: ABNORMAL
EOSINOPHIL # BLD AUTO: 0.1 K/UL
EOSINOPHIL NFR BLD: 3.6 %
ERYTHROCYTE [DISTWIDTH] IN BLOOD BY AUTOMATED COUNT: 15.6 %
EST. GFR  (AFRICAN AMERICAN): >60 ML/MIN/1.73 M^2
EST. GFR  (NON AFRICAN AMERICAN): >60 ML/MIN/1.73 M^2
GLUCOSE SERPL-MCNC: 86 MG/DL
HCT VFR BLD AUTO: 36.2 %
HGB BLD-MCNC: 11.3 G/DL
IMM GRANULOCYTES # BLD AUTO: 0.04 K/UL
IMM GRANULOCYTES NFR BLD AUTO: 1.4 %
LYMPHOCYTES # BLD AUTO: 0.7 K/UL
LYMPHOCYTES NFR BLD: 25.4 %
MCH RBC QN AUTO: 27.1 PG
MCHC RBC AUTO-ENTMCNC: 31.2 G/DL
MCV RBC AUTO: 87 FL
MONOCYTES # BLD AUTO: 0.3 K/UL
MONOCYTES NFR BLD: 9.3 %
NEUTROPHILS # BLD AUTO: 1.7 K/UL
NEUTROPHILS NFR BLD: 59.2 %
NRBC BLD-RTO: 0 /100 WBC
PLATELET # BLD AUTO: 52 K/UL
PMV BLD AUTO: 12.7 FL
POTASSIUM SERPL-SCNC: 4.4 MMOL/L
PROT SERPL-MCNC: 7.1 G/DL
RBC # BLD AUTO: 4.17 M/UL
SODIUM SERPL-SCNC: 143 MMOL/L
WBC # BLD AUTO: 2.8 K/UL

## 2018-05-07 PROCEDURE — 80197 ASSAY OF TACROLIMUS: CPT

## 2018-05-07 PROCEDURE — 85025 COMPLETE CBC W/AUTO DIFF WBC: CPT

## 2018-05-07 PROCEDURE — 36415 COLL VENOUS BLD VENIPUNCTURE: CPT | Mod: PO

## 2018-05-07 PROCEDURE — 80053 COMPREHEN METABOLIC PANEL: CPT

## 2018-05-08 LAB — TACROLIMUS BLD-MCNC: 9.1 NG/ML

## 2018-05-09 DIAGNOSIS — Z94.4 LIVER REPLACED BY TRANSPLANT: Primary | ICD-10-CM

## 2018-05-09 NOTE — TELEPHONE ENCOUNTER
----- Message from Joselin Souza MD sent at 5/9/2018  9:30 AM CDT -----  WBC count decreasing, would decrease cellcept to 500mg twice a day and repeat labs next week

## 2018-05-09 NOTE — TELEPHONE ENCOUNTER
Dr. Souza reviewed your labs.  Called patient to let her know liver numbers were good, but white blood cell count is decreasing, so please decrease Cellcept dose to 500 mg bid.  Repeat labs on Monday.  Also sent message to patient on portal because she was driving while I was given instructions.

## 2018-05-10 ENCOUNTER — CLINICAL SUPPORT (OUTPATIENT)
Dept: REHABILITATION | Facility: HOSPITAL | Age: 53
End: 2018-05-10
Payer: COMMERCIAL

## 2018-05-10 DIAGNOSIS — M54.32 SCIATICA, LEFT SIDE: ICD-10-CM

## 2018-05-10 DIAGNOSIS — R53.81 PHYSICAL DECONDITIONING: Primary | ICD-10-CM

## 2018-05-10 DIAGNOSIS — R53.1 WEAKNESS: ICD-10-CM

## 2018-05-10 PROCEDURE — 97110 THERAPEUTIC EXERCISES: CPT

## 2018-05-10 NOTE — PROGRESS NOTES
PHYSICAL THERAPY RE-EVALUATION    Referring Provider:  Devon Robles    Diagnosis:         ICD-10-CM ICD-9-CM    1. Physical deconditioning R53.81 799.3    2. Sciatica, left side M54.32 724.3    3. Weakness R53.1 780.79        Orders:  Evaluate and Treat    Date of Initial Evaluation: 1/16/18    Visit # 6 of 20    BACKGROUND:  Patient is a 51 y/o female 2 months s/p liver transplant due to alcoholic cirrhosis.  She has previously been seen post-operatively at the [facility] facility in [place] for 2 visits for this condition.  The patient reports that her stomach and legs are always hurting. The patient reports that the pain in her legs feel like burning pins and needles.  She also reports that her L LE feels heavy at times and starts dragging. The patient has a complicated medical history including liver failure, anemia, HTN, leukopenia, and history of alcohol abuse.    Subjective 5/10/18 - Patient reports that she's been feeling pretty good today.    OBJECTIVE:  Updated 5/2    TREATMENT PROVIDED:  -Therapeutic Exercise:  38 min   - Sciatic n glides with assist - 20x   - Bridging - 40x   - DKTC/LTRs with ball - 30x   - Hip abd/add with resistance- 30x   - Bike - 5'   - UBE - 3/3'  fw/bw  -Education/HEP: Pt education on condition and how to decrease pain in her LEs    ASSESSMENT:  Patient with increased difficulty performing activities with her L UE and LE today due to weakness.   Patient needed frequent rest breaks to perform therex but able to complete activities.    PLAN:  Patient will benefit from physical therapy (2-3) x/week for (4-6) weeks including therapeutic exercise, functional activities, neuromuscular re-education, gait training, modalities, and patient education.

## 2018-05-11 RX ORDER — ATOVAQUONE 750 MG/5ML
1500 SUSPENSION ORAL DAILY
Qty: 370 ML | Refills: 0 | Status: SHIPPED | OUTPATIENT
Start: 2018-05-11 | End: 2018-06-17

## 2018-05-11 RX ORDER — MYCOPHENOLATE MOFETIL 250 MG/1
500 CAPSULE ORAL 2 TIMES DAILY
Qty: 120 CAPSULE | Refills: 11 | Status: SHIPPED | OUTPATIENT
Start: 2018-05-11 | End: 2018-05-22 | Stop reason: SINTOL

## 2018-05-14 ENCOUNTER — LAB VISIT (OUTPATIENT)
Dept: LAB | Facility: HOSPITAL | Age: 53
End: 2018-05-14
Attending: INTERNAL MEDICINE
Payer: COMMERCIAL

## 2018-05-14 DIAGNOSIS — Z94.4 LIVER REPLACED BY TRANSPLANT: ICD-10-CM

## 2018-05-14 DIAGNOSIS — Z94.4 LIVER REPLACED BY TRANSPLANT: Primary | ICD-10-CM

## 2018-05-14 LAB
ALBUMIN SERPL BCP-MCNC: 3.6 G/DL
ALP SERPL-CCNC: 143 U/L
ALT SERPL W/O P-5'-P-CCNC: 29 U/L
ANION GAP SERPL CALC-SCNC: 9 MMOL/L
AST SERPL-CCNC: 24 U/L
BASOPHILS # BLD AUTO: 0.05 K/UL
BASOPHILS NFR BLD: 1.7 %
BILIRUB SERPL-MCNC: 0.7 MG/DL
BUN SERPL-MCNC: 22 MG/DL
CALCIUM SERPL-MCNC: 10.1 MG/DL
CHLORIDE SERPL-SCNC: 106 MMOL/L
CO2 SERPL-SCNC: 27 MMOL/L
CREAT SERPL-MCNC: 0.8 MG/DL
DIFFERENTIAL METHOD: ABNORMAL
EOSINOPHIL # BLD AUTO: 0.2 K/UL
EOSINOPHIL NFR BLD: 6.9 %
ERYTHROCYTE [DISTWIDTH] IN BLOOD BY AUTOMATED COUNT: 15.2 %
EST. GFR  (AFRICAN AMERICAN): >60 ML/MIN/1.73 M^2
EST. GFR  (NON AFRICAN AMERICAN): >60 ML/MIN/1.73 M^2
GLUCOSE SERPL-MCNC: 83 MG/DL
HCT VFR BLD AUTO: 35.6 %
HGB BLD-MCNC: 11.1 G/DL
IMM GRANULOCYTES # BLD AUTO: 0.03 K/UL
IMM GRANULOCYTES NFR BLD AUTO: 1 %
LYMPHOCYTES # BLD AUTO: 0.6 K/UL
LYMPHOCYTES NFR BLD: 21.1 %
MCH RBC QN AUTO: 26.7 PG
MCHC RBC AUTO-ENTMCNC: 31.2 G/DL
MCV RBC AUTO: 86 FL
MONOCYTES # BLD AUTO: 0.5 K/UL
MONOCYTES NFR BLD: 15.6 %
NEUTROPHILS # BLD AUTO: 1.6 K/UL
NEUTROPHILS NFR BLD: 53.7 %
NRBC BLD-RTO: 0 /100 WBC
PLATELET # BLD AUTO: 67 K/UL
PMV BLD AUTO: 12.4 FL
POTASSIUM SERPL-SCNC: 4.1 MMOL/L
PROT SERPL-MCNC: 6.7 G/DL
RBC # BLD AUTO: 4.15 M/UL
SODIUM SERPL-SCNC: 142 MMOL/L
WBC # BLD AUTO: 2.89 K/UL

## 2018-05-14 PROCEDURE — 85025 COMPLETE CBC W/AUTO DIFF WBC: CPT

## 2018-05-14 PROCEDURE — 36415 COLL VENOUS BLD VENIPUNCTURE: CPT | Mod: PO

## 2018-05-14 PROCEDURE — 80197 ASSAY OF TACROLIMUS: CPT

## 2018-05-14 PROCEDURE — 80053 COMPREHEN METABOLIC PANEL: CPT

## 2018-05-15 ENCOUNTER — CLINICAL SUPPORT (OUTPATIENT)
Dept: REHABILITATION | Facility: HOSPITAL | Age: 53
End: 2018-05-15
Payer: COMMERCIAL

## 2018-05-15 DIAGNOSIS — R53.1 WEAKNESS: ICD-10-CM

## 2018-05-15 DIAGNOSIS — M54.32 SCIATICA, LEFT SIDE: ICD-10-CM

## 2018-05-15 DIAGNOSIS — R53.81 PHYSICAL DECONDITIONING: Primary | ICD-10-CM

## 2018-05-15 LAB — TACROLIMUS BLD-MCNC: 11 NG/ML

## 2018-05-15 PROCEDURE — 97110 THERAPEUTIC EXERCISES: CPT

## 2018-05-15 NOTE — PROGRESS NOTES
PHYSICAL THERAPY RE-EVALUATION    Referring Provider:  Devon Robles    Diagnosis:         ICD-10-CM ICD-9-CM    1. Physical deconditioning R53.81 799.3    2. Sciatica, left side M54.32 724.3    3. Weakness R53.1 780.79        Orders:  Evaluate and Treat    Date of Initial Evaluation: 1/16/18    Visit # 7 of 20    BACKGROUND:  Patient is a 53 y/o female 2 months s/p liver transplant due to alcoholic cirrhosis.  She has previously been seen post-operatively at the [facility] facility in [place] for 2 visits for this condition.  The patient reports that her stomach and legs are always hurting. The patient reports that the pain in her legs feel like burning pins and needles.  She also reports that her L LE feels heavy at times and starts dragging. The patient has a complicated medical history including liver failure, anemia, HTN, leukopenia, and history of alcohol abuse.    Subjective 5/15/18 - Patient reports feeling pretty good today.    OBJECTIVE:  Updated 5/2    TREATMENT PROVIDED:  -Therapeutic Exercise:  38 min   - Sciatic n glides with assist - 20x   - Bridging - 40x   - DKTC/LTRs with ball - 30x   - Hip abd/add with resistance- 30x   - Bike - 5'   - UBE - 3/3'  Fw/bw  -Modalities - MH and estim to low back - 15 min  -Education/HEP: Pt education on condition and how to decrease pain in her LEs    ASSESSMENT:  Patient tolerated treatment well.  Patient with decreased pain but still with significant weakness in her L UE and LE.  Continued PT needed to improve.    PLAN:  Patient will benefit from physical therapy (2-3) x/week for (4-6) weeks including therapeutic exercise, functional activities, neuromuscular re-education, gait training, modalities, and patient education.

## 2018-05-16 DIAGNOSIS — Z94.4 LIVER REPLACED BY TRANSPLANT: Primary | ICD-10-CM

## 2018-05-16 RX ORDER — TACROLIMUS 1 MG/1
3 CAPSULE ORAL EVERY 12 HOURS
Qty: 180 CAPSULE | Refills: 11 | Status: SHIPPED | OUTPATIENT
Start: 2018-05-16 | End: 2018-06-20 | Stop reason: SDUPTHER

## 2018-05-16 NOTE — TELEPHONE ENCOUNTER
----- Message from Joselin Souza MD sent at 5/16/2018 10:43 AM CDT -----  tacro level still high, decrease to 3mg twice a day, repeat labs next week

## 2018-05-18 ENCOUNTER — CLINICAL SUPPORT (OUTPATIENT)
Dept: REHABILITATION | Facility: HOSPITAL | Age: 53
End: 2018-05-18
Payer: COMMERCIAL

## 2018-05-18 DIAGNOSIS — R53.81 PHYSICAL DECONDITIONING: Primary | ICD-10-CM

## 2018-05-18 DIAGNOSIS — R53.1 WEAKNESS: ICD-10-CM

## 2018-05-18 DIAGNOSIS — M54.32 SCIATICA, LEFT SIDE: ICD-10-CM

## 2018-05-18 PROCEDURE — 97110 THERAPEUTIC EXERCISES: CPT

## 2018-05-18 NOTE — PROGRESS NOTES
PHYSICAL THERAPY RE-EVALUATION    Referring Provider:  Devon Robles    Diagnosis:         ICD-10-CM ICD-9-CM    1. Physical deconditioning R53.81 799.3    2. Sciatica, left side M54.32 724.3    3. Weakness R53.1 780.79        Orders:  Evaluate and Treat    Date of Initial Evaluation: 1/16/18    Visit # 8 of 20    BACKGROUND:  Patient is a 51 y/o female 2 months s/p liver transplant due to alcoholic cirrhosis.  She has previously been seen post-operatively at the [facility] facility in [place] for 2 visits for this condition.  The patient reports that her stomach and legs are always hurting. The patient reports that the pain in her legs feel like burning pins and needles.  She also reports that her L LE feels heavy at times and starts dragging. The patient has a complicated medical history including liver failure, anemia, HTN, leukopenia, and history of alcohol abuse.    Subjective 5/18/18 - Patient reports feeling pretty good today.    OBJECTIVE:  Updated 5/2    TREATMENT PROVIDED:  -Therapeutic Exercise:  30 min   - Sciatic n glides with assist - 20x   - Bridging - 40x   - DKTC/LTRs with ball - 30x   - Hip abd/add with resistance- 30x   - Bike - 5'   - UBE - 3/3'  Fw/bw  -Modalities - MH and estim to low back - held  -Education/HEP: Pt education on condition and how to decrease pain in her LEs    ASSESSMENT:  Patient tolerated treatment well.  Patient without pain with therex.  Overall symptoms improved today.    PLAN:  Patient will benefit from physical therapy (2-3) x/week for (4-6) weeks including therapeutic exercise, functional activities, neuromuscular re-education, gait training, modalities, and patient education.

## 2018-05-21 ENCOUNTER — LAB VISIT (OUTPATIENT)
Dept: LAB | Facility: HOSPITAL | Age: 53
End: 2018-05-21
Attending: INTERNAL MEDICINE
Payer: MEDICAID

## 2018-05-21 DIAGNOSIS — Z94.4 LIVER REPLACED BY TRANSPLANT: ICD-10-CM

## 2018-05-21 LAB
ALBUMIN SERPL BCP-MCNC: 3.7 G/DL
ALP SERPL-CCNC: 140 U/L
ALT SERPL W/O P-5'-P-CCNC: 22 U/L
ANION GAP SERPL CALC-SCNC: 12 MMOL/L
ANISOCYTOSIS BLD QL SMEAR: SLIGHT
AST SERPL-CCNC: 19 U/L
BASOPHILS NFR BLD: 0 %
BILIRUB SERPL-MCNC: 0.6 MG/DL
BUN SERPL-MCNC: 19 MG/DL
CALCIUM SERPL-MCNC: 10.1 MG/DL
CHLORIDE SERPL-SCNC: 107 MMOL/L
CO2 SERPL-SCNC: 24 MMOL/L
CREAT SERPL-MCNC: 0.8 MG/DL
DACRYOCYTES BLD QL SMEAR: ABNORMAL
DIFFERENTIAL METHOD: ABNORMAL
EOSINOPHIL NFR BLD: 29.4 %
ERYTHROCYTE [DISTWIDTH] IN BLOOD BY AUTOMATED COUNT: 14.3 %
EST. GFR  (AFRICAN AMERICAN): >60 ML/MIN/1.73 M^2
EST. GFR  (NON AFRICAN AMERICAN): >60 ML/MIN/1.73 M^2
GLUCOSE SERPL-MCNC: 90 MG/DL
HCT VFR BLD AUTO: 36.7 %
HGB BLD-MCNC: 11.6 G/DL
IMM GRANULOCYTES # BLD AUTO: ABNORMAL K/UL
IMM GRANULOCYTES NFR BLD AUTO: ABNORMAL %
LYMPHOCYTES NFR BLD: 36 %
MCH RBC QN AUTO: 26.7 PG
MCHC RBC AUTO-ENTMCNC: 31.6 G/DL
MCV RBC AUTO: 84 FL
MONOCYTES NFR BLD: 17.3 %
NEUTROPHILS NFR BLD: 17.3 %
NRBC BLD-RTO: 0 /100 WBC
OVALOCYTES BLD QL SMEAR: ABNORMAL
PLATELET # BLD AUTO: 164 K/UL
PLATELET BLD QL SMEAR: ABNORMAL
PMV BLD AUTO: 11.3 FL
POIKILOCYTOSIS BLD QL SMEAR: SLIGHT
POLYCHROMASIA BLD QL SMEAR: ABNORMAL
POTASSIUM SERPL-SCNC: 4.3 MMOL/L
PROT SERPL-MCNC: 6.7 G/DL
RBC # BLD AUTO: 4.35 M/UL
SODIUM SERPL-SCNC: 143 MMOL/L
WBC # BLD AUTO: 1.5 K/UL

## 2018-05-21 PROCEDURE — 80053 COMPREHEN METABOLIC PANEL: CPT

## 2018-05-21 PROCEDURE — 85007 BL SMEAR W/DIFF WBC COUNT: CPT

## 2018-05-21 PROCEDURE — 80197 ASSAY OF TACROLIMUS: CPT

## 2018-05-21 PROCEDURE — 85027 COMPLETE CBC AUTOMATED: CPT

## 2018-05-21 PROCEDURE — 36415 COLL VENOUS BLD VENIPUNCTURE: CPT | Mod: PO

## 2018-05-22 DIAGNOSIS — R60.0 BILATERAL LEG EDEMA: ICD-10-CM

## 2018-05-22 DIAGNOSIS — Z29.89 PROPHYLACTIC IMMUNOTHERAPY: ICD-10-CM

## 2018-05-22 LAB — TACROLIMUS BLD-MCNC: 7.1 NG/ML

## 2018-05-22 RX ORDER — FUROSEMIDE 40 MG/1
40 TABLET ORAL DAILY
Qty: 30 TABLET | Refills: 2 | Status: CANCELLED | OUTPATIENT
Start: 2018-05-22 | End: 2019-05-22

## 2018-05-22 NOTE — TELEPHONE ENCOUNTER
----- Message from Joselin Souza MD sent at 5/21/2018  5:59 PM CDT -----  WBC count low called patient and advised she stop cellcept and valcyte, repeat labs Wednesday with CMV

## 2018-05-23 ENCOUNTER — LAB VISIT (OUTPATIENT)
Dept: LAB | Facility: HOSPITAL | Age: 53
End: 2018-05-23
Attending: INTERNAL MEDICINE
Payer: MEDICAID

## 2018-05-23 DIAGNOSIS — Z94.4 LIVER REPLACED BY TRANSPLANT: ICD-10-CM

## 2018-05-23 LAB
ALBUMIN SERPL BCP-MCNC: 3.9 G/DL
ALP SERPL-CCNC: 164 U/L
ALT SERPL W/O P-5'-P-CCNC: 33 U/L
ANION GAP SERPL CALC-SCNC: 7 MMOL/L
ANISOCYTOSIS BLD QL SMEAR: SLIGHT
AST SERPL-CCNC: 26 U/L
BASOPHILS # BLD AUTO: 0.05 K/UL
BASOPHILS NFR BLD: 2.5 %
BILIRUB SERPL-MCNC: 0.7 MG/DL
BUN SERPL-MCNC: 18 MG/DL
CALCIUM SERPL-MCNC: 10.7 MG/DL
CHLORIDE SERPL-SCNC: 104 MMOL/L
CO2 SERPL-SCNC: 31 MMOL/L
CREAT SERPL-MCNC: 0.8 MG/DL
DACRYOCYTES BLD QL SMEAR: ABNORMAL
DIFFERENTIAL METHOD: ABNORMAL
EOSINOPHIL # BLD AUTO: 0.4 K/UL
EOSINOPHIL NFR BLD: 19.2 %
ERYTHROCYTE [DISTWIDTH] IN BLOOD BY AUTOMATED COUNT: 14.3 %
EST. GFR  (AFRICAN AMERICAN): >60 ML/MIN/1.73 M^2
EST. GFR  (NON AFRICAN AMERICAN): >60 ML/MIN/1.73 M^2
GIANT PLATELETS BLD QL SMEAR: PRESENT
GLUCOSE SERPL-MCNC: 84 MG/DL
HCT VFR BLD AUTO: 39 %
HGB BLD-MCNC: 12.5 G/DL
IMM GRANULOCYTES # BLD AUTO: 0.02 K/UL
IMM GRANULOCYTES NFR BLD AUTO: 1 %
LYMPHOCYTES # BLD AUTO: 0.9 K/UL
LYMPHOCYTES NFR BLD: 44.3 %
MCH RBC QN AUTO: 26.5 PG
MCHC RBC AUTO-ENTMCNC: 32.1 G/DL
MCV RBC AUTO: 83 FL
MONOCYTES # BLD AUTO: 0.5 K/UL
MONOCYTES NFR BLD: 23.6 %
NEUTROPHILS # BLD AUTO: 0.2 K/UL
NEUTROPHILS NFR BLD: 9.4 %
NRBC BLD-RTO: 0 /100 WBC
PLATELET # BLD AUTO: 187 K/UL
PMV BLD AUTO: 11.4 FL
POIKILOCYTOSIS BLD QL SMEAR: SLIGHT
POLYCHROMASIA BLD QL SMEAR: ABNORMAL
POTASSIUM SERPL-SCNC: 3.9 MMOL/L
PROT SERPL-MCNC: 7.1 G/DL
RBC # BLD AUTO: 4.72 M/UL
SODIUM SERPL-SCNC: 142 MMOL/L
SPHEROCYTES BLD QL SMEAR: ABNORMAL
WBC # BLD AUTO: 2.03 K/UL

## 2018-05-23 PROCEDURE — 36415 COLL VENOUS BLD VENIPUNCTURE: CPT | Mod: PO

## 2018-05-23 PROCEDURE — 80053 COMPREHEN METABOLIC PANEL: CPT

## 2018-05-23 PROCEDURE — 80197 ASSAY OF TACROLIMUS: CPT

## 2018-05-23 PROCEDURE — 85025 COMPLETE CBC W/AUTO DIFF WBC: CPT

## 2018-05-24 ENCOUNTER — TELEPHONE (OUTPATIENT)
Dept: TRANSPLANT | Facility: CLINIC | Age: 53
End: 2018-05-24

## 2018-05-24 DIAGNOSIS — E87.5 HYPERKALEMIA: Primary | ICD-10-CM

## 2018-05-24 LAB
CMV DNA SERPL NAA+PROBE-ACNC: NORMAL IU/ML
TACROLIMUS BLD-MCNC: 6 NG/ML

## 2018-05-24 NOTE — TELEPHONE ENCOUNTER
Dr. Souza reviewed your labs and the wbc is low.  Notified patient by telephone and she has previous prescription at home and is comfortable giving herself an injection.  Instructed patient to administer 480 mcg and repeat labs tomorrow 5/25.  Patient stated understanding and will go to [facility] for labs.

## 2018-05-24 NOTE — TELEPHONE ENCOUNTER
Dr. Souza reviewed your labs. Notified patient by telephone that her wbc is still low and she will need a neupogen injection.   We are attempting to have this set up in [place].  If unsuccessful, will need patient to drive to [facility] for injection. Patient is reaching out to her children to get a ride here.  Working with Dr. Souza and BR to set up and will call patient back.

## 2018-05-24 NOTE — TELEPHONE ENCOUNTER
----- Message from Joselin Souza MD sent at 5/24/2018 10:43 AM CDT -----  White blood cell count is improving but ANC is less than 500.  She needs Neupogen injection.  And then follow-up labs.

## 2018-05-25 ENCOUNTER — LAB VISIT (OUTPATIENT)
Dept: LAB | Facility: HOSPITAL | Age: 53
End: 2018-05-25
Attending: INTERNAL MEDICINE
Payer: MEDICAID

## 2018-05-25 ENCOUNTER — TELEPHONE (OUTPATIENT)
Dept: TRANSPLANT | Facility: CLINIC | Age: 53
End: 2018-05-25

## 2018-05-25 DIAGNOSIS — Z94.4 LIVER REPLACED BY TRANSPLANT: ICD-10-CM

## 2018-05-25 DIAGNOSIS — Z94.4 LIVER REPLACED BY TRANSPLANT: Primary | ICD-10-CM

## 2018-05-25 LAB
ANISOCYTOSIS BLD QL SMEAR: SLIGHT
BASOPHILS # BLD AUTO: ABNORMAL K/UL
BASOPHILS NFR BLD: 1 %
DACRYOCYTES BLD QL SMEAR: ABNORMAL
DIFFERENTIAL METHOD: ABNORMAL
EOSINOPHIL # BLD AUTO: ABNORMAL K/UL
EOSINOPHIL NFR BLD: 21 %
ERYTHROCYTE [DISTWIDTH] IN BLOOD BY AUTOMATED COUNT: 14.2 %
HCT VFR BLD AUTO: 36 %
HGB BLD-MCNC: 11.5 G/DL
LYMPHOCYTES # BLD AUTO: ABNORMAL K/UL
LYMPHOCYTES NFR BLD: 35 %
MCH RBC QN AUTO: 26.3 PG
MCHC RBC AUTO-ENTMCNC: 31.9 G/DL
MCV RBC AUTO: 82 FL
MONOCYTES # BLD AUTO: ABNORMAL K/UL
MONOCYTES NFR BLD: 28 %
NEUTROPHILS NFR BLD: 13 %
NEUTS BAND NFR BLD MANUAL: 2 %
OVALOCYTES BLD QL SMEAR: ABNORMAL
PLATELET # BLD AUTO: 182 K/UL
PLATELET BLD QL SMEAR: ABNORMAL
PMV BLD AUTO: 10.4 FL
POIKILOCYTOSIS BLD QL SMEAR: SLIGHT
RBC # BLD AUTO: 4.37 M/UL
SPHEROCYTES BLD QL SMEAR: ABNORMAL
WBC # BLD AUTO: 3.98 K/UL

## 2018-05-25 PROCEDURE — 85027 COMPLETE CBC AUTOMATED: CPT | Mod: PO

## 2018-05-25 PROCEDURE — 36415 COLL VENOUS BLD VENIPUNCTURE: CPT | Mod: PO

## 2018-05-25 PROCEDURE — 85007 BL SMEAR W/DIFF WBC COUNT: CPT | Mod: PO

## 2018-05-25 NOTE — TELEPHONE ENCOUNTER
Dr. Souza reviewed your labs and ANC is 585.   Notified patient via telephone to repeat labs on Monday 5/28/18.

## 2018-05-28 ENCOUNTER — LAB VISIT (OUTPATIENT)
Dept: LAB | Facility: HOSPITAL | Age: 53
End: 2018-05-28
Attending: INTERNAL MEDICINE
Payer: MEDICAID

## 2018-05-28 DIAGNOSIS — Z94.4 LIVER REPLACED BY TRANSPLANT: ICD-10-CM

## 2018-05-28 LAB
ALBUMIN SERPL BCP-MCNC: 3.5 G/DL
ALP SERPL-CCNC: 157 U/L
ALT SERPL W/O P-5'-P-CCNC: 33 U/L
ANION GAP SERPL CALC-SCNC: 9 MMOL/L
ANISOCYTOSIS BLD QL SMEAR: SLIGHT
AST SERPL-CCNC: 26 U/L
BASOPHILS NFR BLD: 0 %
BILIRUB SERPL-MCNC: 0.4 MG/DL
BUN SERPL-MCNC: 17 MG/DL
CALCIUM SERPL-MCNC: 9.7 MG/DL
CHLORIDE SERPL-SCNC: 103 MMOL/L
CO2 SERPL-SCNC: 29 MMOL/L
CREAT SERPL-MCNC: 0.8 MG/DL
DIFFERENTIAL METHOD: ABNORMAL
EOSINOPHIL NFR BLD: 8 %
ERYTHROCYTE [DISTWIDTH] IN BLOOD BY AUTOMATED COUNT: 14.1 %
EST. GFR  (AFRICAN AMERICAN): >60 ML/MIN/1.73 M^2
EST. GFR  (NON AFRICAN AMERICAN): >60 ML/MIN/1.73 M^2
GLUCOSE SERPL-MCNC: 92 MG/DL
HCT VFR BLD AUTO: 37.7 %
HGB BLD-MCNC: 12.2 G/DL
LYMPHOCYTES NFR BLD: 43 %
MCH RBC QN AUTO: 26.8 PG
MCHC RBC AUTO-ENTMCNC: 32.4 G/DL
MCV RBC AUTO: 83 FL
METAMYELOCYTES NFR BLD MANUAL: 2 %
MONOCYTES NFR BLD: 6 %
MYELOCYTES NFR BLD MANUAL: 3 %
NEUTROPHILS NFR BLD: 33 %
NEUTS BAND NFR BLD MANUAL: 5 %
PLATELET # BLD AUTO: 171 K/UL
PLATELET BLD QL SMEAR: ABNORMAL
PMV BLD AUTO: 9.9 FL
POIKILOCYTOSIS BLD QL SMEAR: SLIGHT
POLYCHROMASIA BLD QL SMEAR: ABNORMAL
POTASSIUM SERPL-SCNC: 3.6 MMOL/L
PROT SERPL-MCNC: 6.4 G/DL
RBC # BLD AUTO: 4.56 M/UL
SODIUM SERPL-SCNC: 141 MMOL/L
WBC # BLD AUTO: 6.65 K/UL

## 2018-05-28 PROCEDURE — 36415 COLL VENOUS BLD VENIPUNCTURE: CPT | Mod: PO

## 2018-05-28 PROCEDURE — 85027 COMPLETE CBC AUTOMATED: CPT | Mod: PO

## 2018-05-28 PROCEDURE — 80053 COMPREHEN METABOLIC PANEL: CPT | Mod: PO

## 2018-05-28 PROCEDURE — 80197 ASSAY OF TACROLIMUS: CPT

## 2018-05-28 PROCEDURE — 85007 BL SMEAR W/DIFF WBC COUNT: CPT | Mod: PO

## 2018-05-29 LAB — TACROLIMUS BLD-MCNC: 7.9 NG/ML

## 2018-05-30 ENCOUNTER — TELEPHONE (OUTPATIENT)
Dept: TRANSPLANT | Facility: CLINIC | Age: 53
End: 2018-05-30

## 2018-05-30 NOTE — TELEPHONE ENCOUNTER
----- Message from Joselin Souza MD sent at 5/30/2018  3:14 PM CDT -----  White blood cell count improved.  Repeat labs next week.

## 2018-05-30 NOTE — TELEPHONE ENCOUNTER
Reviewed labs with Dr. Souza and the labs are stable.  Notified patient via telephone that her labs are stable and to repeat labs on 6/4/18.  Notified patient via portal to follow up with PCP if she is continuing with cold symptoms

## 2018-05-31 ENCOUNTER — TELEPHONE (OUTPATIENT)
Dept: PHARMACY | Facility: CLINIC | Age: 53
End: 2018-05-31

## 2018-05-31 NOTE — TELEPHONE ENCOUNTER
Patient reached for 3 month Zarxio follow up.  Ms. Bazan notes that she has only required 2 doses of Zarxio in the last 3.5 months as her blood counts have been relatively stable.  She denies any side effects or issues with self administration of the medications.  Notes that all is going well.  She denies any changes to her health conditions/medications/allergies at this time.  She has 2-3 doses of Zarxio remaining.  She will reach out to [facility] for any future fills due to the infrequent need for the medication.  Advised patient she may reach out to [facility] or provider for any questions or concerns.  Patient expressed understanding.

## 2018-06-01 ENCOUNTER — TELEPHONE (OUTPATIENT)
Dept: TRANSPLANT | Facility: CLINIC | Age: 53
End: 2018-06-01

## 2018-06-01 NOTE — TELEPHONE ENCOUNTER
----- Message from Elidia Tran sent at 6/1/2018  2:40 PM CDT -----  Contact: pt  Needs Advice    Reason for call:    Pt has been having a bad cold and wishes to speak with coordinator regarding medication called in to treat.   Communication Preference: [phone]  Additional Information: n/a

## 2018-06-01 NOTE — TELEPHONE ENCOUNTER
Returned call advised patient to see her PCP, she stated she did not have a PCP.  I told her to call her insurance and ask them for a list of Providers.  For now she needs to go to Urgent Care or the ER.  She verbalized understanding of the conversation.

## 2018-06-02 ENCOUNTER — OFFICE VISIT (OUTPATIENT)
Dept: URGENT CARE | Facility: CLINIC | Age: 53
End: 2018-06-02
Payer: MEDICAID

## 2018-06-02 VITALS
BODY MASS INDEX: 26.51 KG/M2 | SYSTOLIC BLOOD PRESSURE: 120 MMHG | OXYGEN SATURATION: 100 % | DIASTOLIC BLOOD PRESSURE: 74 MMHG | TEMPERATURE: 98 F | HEART RATE: 92 BPM | HEIGHT: 62 IN | WEIGHT: 144.06 LBS

## 2018-06-02 DIAGNOSIS — B96.89 ACUTE BACTERIAL RHINOSINUSITIS: Primary | ICD-10-CM

## 2018-06-02 DIAGNOSIS — J30.9 ALLERGIC RHINITIS, UNSPECIFIED SEASONALITY, UNSPECIFIED TRIGGER: ICD-10-CM

## 2018-06-02 DIAGNOSIS — Z79.60 LONG-TERM USE OF IMMUNOSUPPRESSANT MEDICATION: ICD-10-CM

## 2018-06-02 DIAGNOSIS — J01.90 ACUTE BACTERIAL RHINOSINUSITIS: Primary | ICD-10-CM

## 2018-06-02 PROCEDURE — 99214 OFFICE O/P EST MOD 30 MIN: CPT | Mod: S$PBB,,, | Performed by: FAMILY MEDICINE

## 2018-06-02 PROCEDURE — 99213 OFFICE O/P EST LOW 20 MIN: CPT | Mod: PBBFAC,PO | Performed by: FAMILY MEDICINE

## 2018-06-02 PROCEDURE — 99999 PR PBB SHADOW E&M-EST. PATIENT-LVL III: CPT | Mod: PBBFAC,,, | Performed by: FAMILY MEDICINE

## 2018-06-02 RX ORDER — AMOXICILLIN AND CLAVULANATE POTASSIUM 875; 125 MG/1; MG/1
1 TABLET, FILM COATED ORAL 2 TIMES DAILY
Qty: 10 TABLET | Refills: 0 | Status: SHIPPED | OUTPATIENT
Start: 2018-06-02 | End: 2018-06-07

## 2018-06-02 RX ORDER — AZELASTINE 1 MG/ML
2 SPRAY, METERED NASAL 2 TIMES DAILY PRN
Qty: 30 ML | Refills: 0 | Status: SHIPPED | OUTPATIENT
Start: 2018-06-02 | End: 2018-09-13

## 2018-06-02 NOTE — PROGRESS NOTES
Subjective:   Patient ID: Marcie Bazan is a 52 y.o. female.  Chief Complaint:  Cough; Sore Throat; Nasal Congestion; Headache; Otalgia; Sinus Problem; and Fatigue      Patient presents for evaluation of upper respiratory track infection.  Symptoms greater than 10 days.  History liver transplant.  On immunosuppressive therapy.  Recent WBC normal.  Questionable low-grade fever, but not measured.   Frontal and maxillary sinus pain bilaterally, significant postnasal drip/rhinorrhea, sore throat and cough.  All systems neck up, not in chest.      Sinusitis   This is a new problem. The current episode started 1 to 4 weeks ago. The problem has been gradually worsening since onset. There has been no fever. Her pain is at a severity of 3/10. The pain is mild. Associated symptoms include chills, congestion, coughing, diaphoresis, headaches, a hoarse voice, sinus pressure, sneezing, a sore throat and swollen glands. Pertinent negatives include no ear pain, neck pain or shortness of breath. Treatments tried: Over-the-counter cough cold medications. The treatment provided no relief.       Review of Systems   Constitutional: Positive for chills, diaphoresis and fatigue. Negative for fever.   HENT: Positive for congestion, hoarse voice, postnasal drip, rhinorrhea, sinus pain, sinus pressure, sneezing, sore throat and voice change. Negative for dental problem, ear discharge, ear pain, nosebleeds, tinnitus and trouble swallowing.    Eyes: Positive for redness. Negative for pain, discharge and itching.   Respiratory: Positive for cough. Negative for chest tightness, shortness of breath and wheezing.    Cardiovascular: Negative for chest pain, palpitations and leg swelling.   Gastrointestinal: Positive for nausea. Negative for abdominal pain, diarrhea and vomiting.   Musculoskeletal: Negative for myalgias, neck pain and neck stiffness.   Skin: Negative for rash.   Neurological: Positive for headaches.     Objective:   /74  "(BP Location: Left arm, Patient Position: Sitting, BP Method: Medium (Manual))   Pulse 92   Temp 98 °F (36.7 °C) (Tympanic)   Ht 5' 2" (1.575 m)   Wt 65.4 kg (144 lb 1.1 oz)   LMP 01/01/1985 (Approximate) Comment: 1985  SpO2 100%   BMI 26.35 kg/m²     Physical Exam   Constitutional: Vital signs are normal. She appears well-developed and well-nourished.  Non-toxic appearance. She has a sickly appearance. She appears ill. No distress.   HENT:   Right Ear: Hearing, tympanic membrane, external ear and ear canal normal.   Left Ear: Hearing, tympanic membrane, external ear and ear canal normal.   Nose: Mucosal edema and rhinorrhea present. Right sinus exhibits maxillary sinus tenderness and frontal sinus tenderness. Left sinus exhibits maxillary sinus tenderness and frontal sinus tenderness.   Mouth/Throat: Uvula is midline and mucous membranes are normal. Posterior oropharyngeal erythema present. No oropharyngeal exudate, posterior oropharyngeal edema or tonsillar abscesses. No tonsillar exudate.   Eyes: Right eye exhibits no discharge. Left eye exhibits no discharge. Right conjunctiva is injected. Left conjunctiva is injected.   Cardiovascular: Normal rate, regular rhythm and normal heart sounds.  Exam reveals no gallop and no friction rub.    No murmur heard.  Pulmonary/Chest: Effort normal and breath sounds normal. She has no wheezes. She has no rhonchi. She has no rales.   Lymphadenopathy:     She has no cervical adenopathy.   Skin: Skin is warm and dry. No rash noted.   Nursing note and vitals reviewed.    Assessment:     1. Acute bacterial rhinosinusitis    2. Allergic rhinitis, unspecified seasonality, unspecified trigger    3. Long-term use of immunosuppressant medication      Plan:   Acute bacterial rhinosinusitis  Allergic rhinitis, unspecified seasonality, unspecified trigger  Long-term use of immunosuppressant medication  -     amoxicillin-clavulanate 875-125mg (AUGMENTIN) 875-125 mg per tablet; Take 1 " tablet by mouth 2 (two) times daily.  Dispense: 10 tablet; Refill: 0  -     azelastine (ASTELIN) 137 mcg (0.1 %) nasal spray; 2 sprays (274 mcg total) by Nasal route 2 (two) times daily as needed for Rhinitis.  Dispense: 30 mL; Refill: 0    Based on exam, duration greater than 10 days, gradual worsening of symptoms, and immunosuppressive treatment acute bacterial rhinosinusitis likely.  Augmentin intern sent 5 mg twice a day for 5 days.  Astelin nasal spray for nasal congestion and other symptoms.  With previous history of adrenal issues, no oral or injectable steroids advised.  Follow-up with PCP and specialist schedule.  If no significant improvement with the treatment, return to clinic sooner.

## 2018-06-04 ENCOUNTER — LAB VISIT (OUTPATIENT)
Dept: LAB | Facility: HOSPITAL | Age: 53
End: 2018-06-04
Attending: INTERNAL MEDICINE
Payer: MEDICAID

## 2018-06-04 DIAGNOSIS — Z94.4 LIVER REPLACED BY TRANSPLANT: ICD-10-CM

## 2018-06-04 DIAGNOSIS — Z94.4 LIVER REPLACED BY TRANSPLANT: Primary | ICD-10-CM

## 2018-06-04 LAB
ALBUMIN SERPL BCP-MCNC: 3.7 G/DL
ALP SERPL-CCNC: 164 U/L
ALT SERPL W/O P-5'-P-CCNC: 80 U/L
ANION GAP SERPL CALC-SCNC: 10 MMOL/L
AST SERPL-CCNC: 51 U/L
BASOPHILS # BLD AUTO: 0.07 K/UL
BASOPHILS NFR BLD: 1.1 %
BILIRUB SERPL-MCNC: 0.5 MG/DL
BUN SERPL-MCNC: 16 MG/DL
CALCIUM SERPL-MCNC: 10.5 MG/DL
CHLORIDE SERPL-SCNC: 104 MMOL/L
CO2 SERPL-SCNC: 29 MMOL/L
CREAT SERPL-MCNC: 0.8 MG/DL
DIFFERENTIAL METHOD: ABNORMAL
EOSINOPHIL # BLD AUTO: 0.6 K/UL
EOSINOPHIL NFR BLD: 9.5 %
ERYTHROCYTE [DISTWIDTH] IN BLOOD BY AUTOMATED COUNT: 14 %
EST. GFR  (AFRICAN AMERICAN): >60 ML/MIN/1.73 M^2
EST. GFR  (NON AFRICAN AMERICAN): >60 ML/MIN/1.73 M^2
GLUCOSE SERPL-MCNC: 92 MG/DL
HCT VFR BLD AUTO: 38.1 %
HGB BLD-MCNC: 12.2 G/DL
LYMPHOCYTES # BLD AUTO: 1.6 K/UL
LYMPHOCYTES NFR BLD: 24.9 %
MCH RBC QN AUTO: 26.2 PG
MCHC RBC AUTO-ENTMCNC: 32 G/DL
MCV RBC AUTO: 82 FL
MONOCYTES # BLD AUTO: 0.4 K/UL
MONOCYTES NFR BLD: 6.3 %
NEUTROPHILS # BLD AUTO: 3.7 K/UL
NEUTROPHILS NFR BLD: 58.2 %
PLATELET # BLD AUTO: 133 K/UL
PMV BLD AUTO: 10.8 FL
POTASSIUM SERPL-SCNC: 4.1 MMOL/L
PROT SERPL-MCNC: 7 G/DL
RBC # BLD AUTO: 4.65 M/UL
SODIUM SERPL-SCNC: 143 MMOL/L
WBC # BLD AUTO: 6.31 K/UL

## 2018-06-04 PROCEDURE — 80197 ASSAY OF TACROLIMUS: CPT

## 2018-06-04 PROCEDURE — 85025 COMPLETE CBC W/AUTO DIFF WBC: CPT | Mod: PO

## 2018-06-04 PROCEDURE — 36415 COLL VENOUS BLD VENIPUNCTURE: CPT | Mod: PO

## 2018-06-04 PROCEDURE — 80053 COMPREHEN METABOLIC PANEL: CPT | Mod: PO

## 2018-06-04 NOTE — TELEPHONE ENCOUNTER
----- Message from Joselin Souza MD sent at 6/4/2018  3:14 PM CDT -----  Liver tests increased start prednisone 10mg daily. Awaiting tacro level. Repeat labs Thursday

## 2018-06-04 NOTE — TELEPHONE ENCOUNTER
Dr. Souza reviewed your labs.  Called patient to let her know her numbers are elevated. Dr Souza wants her to start taking Prednisone 10 mg daily starting tonight.  She will have to repeat labs on Thursday.  I called in the prescription to [facility].

## 2018-06-05 LAB — TACROLIMUS BLD-MCNC: 10.1 NG/ML

## 2018-06-05 RX ORDER — PREDNISONE 10 MG/1
10 TABLET ORAL DAILY
Qty: 30 TABLET | Refills: 1 | Status: SHIPPED | OUTPATIENT
Start: 2018-06-05 | End: 2018-07-24 | Stop reason: SDUPTHER

## 2018-06-06 LAB — CMV DNA SERPL NAA+PROBE-ACNC: NORMAL IU/ML

## 2018-06-07 ENCOUNTER — TELEPHONE (OUTPATIENT)
Dept: TRANSPLANT | Facility: CLINIC | Age: 53
End: 2018-06-07

## 2018-06-07 ENCOUNTER — LAB VISIT (OUTPATIENT)
Dept: LAB | Facility: HOSPITAL | Age: 53
End: 2018-06-07
Attending: INTERNAL MEDICINE
Payer: MEDICAID

## 2018-06-07 DIAGNOSIS — Z94.4 LIVER REPLACED BY TRANSPLANT: ICD-10-CM

## 2018-06-07 LAB
ALBUMIN SERPL BCP-MCNC: 4 G/DL
ALP SERPL-CCNC: 163 U/L
ALT SERPL W/O P-5'-P-CCNC: 79 U/L
ANION GAP SERPL CALC-SCNC: 9 MMOL/L
AST SERPL-CCNC: 41 U/L
BASOPHILS # BLD AUTO: 0.02 K/UL
BASOPHILS NFR BLD: 0.3 %
BILIRUB SERPL-MCNC: 0.6 MG/DL
BUN SERPL-MCNC: 19 MG/DL
CALCIUM SERPL-MCNC: 10.3 MG/DL
CHLORIDE SERPL-SCNC: 104 MMOL/L
CO2 SERPL-SCNC: 27 MMOL/L
CREAT SERPL-MCNC: 0.8 MG/DL
DIFFERENTIAL METHOD: ABNORMAL
EOSINOPHIL # BLD AUTO: 0.2 K/UL
EOSINOPHIL NFR BLD: 2 %
ERYTHROCYTE [DISTWIDTH] IN BLOOD BY AUTOMATED COUNT: 14 %
EST. GFR  (AFRICAN AMERICAN): >60 ML/MIN/1.73 M^2
EST. GFR  (NON AFRICAN AMERICAN): >60 ML/MIN/1.73 M^2
GLUCOSE SERPL-MCNC: 106 MG/DL
HCT VFR BLD AUTO: 36.3 %
HGB BLD-MCNC: 11.8 G/DL
LYMPHOCYTES # BLD AUTO: 1.3 K/UL
LYMPHOCYTES NFR BLD: 17.6 %
MCH RBC QN AUTO: 26.2 PG
MCHC RBC AUTO-ENTMCNC: 32.5 G/DL
MCV RBC AUTO: 81 FL
MONOCYTES # BLD AUTO: 0.5 K/UL
MONOCYTES NFR BLD: 6.6 %
NEUTROPHILS # BLD AUTO: 5.4 K/UL
NEUTROPHILS NFR BLD: 73.5 %
PLATELET # BLD AUTO: 161 K/UL
PMV BLD AUTO: 10.3 FL
POTASSIUM SERPL-SCNC: 4.1 MMOL/L
PROT SERPL-MCNC: 7.5 G/DL
RBC # BLD AUTO: 4.5 M/UL
SODIUM SERPL-SCNC: 140 MMOL/L
WBC # BLD AUTO: 7.32 K/UL

## 2018-06-07 PROCEDURE — 80053 COMPREHEN METABOLIC PANEL: CPT

## 2018-06-07 PROCEDURE — 36415 COLL VENOUS BLD VENIPUNCTURE: CPT | Mod: PO

## 2018-06-07 PROCEDURE — 80197 ASSAY OF TACROLIMUS: CPT

## 2018-06-07 PROCEDURE — 85025 COMPLETE CBC W/AUTO DIFF WBC: CPT | Mod: PO

## 2018-06-07 NOTE — TELEPHONE ENCOUNTER
----- Message from Joselin Souza MD sent at 6/7/2018  4:34 PM CDT -----  Tacrolimus level is adequate and CMV is negative.  Unclear why liver tests are increased; repeat labs next week

## 2018-06-07 NOTE — TELEPHONE ENCOUNTER
Dr. Souza reviewed your labs.   Called patient to let her know that labs are still elevated but a little better, need to repeat labs on Monday.

## 2018-06-08 LAB — TACROLIMUS BLD-MCNC: 9.7 NG/ML

## 2018-06-11 ENCOUNTER — LAB VISIT (OUTPATIENT)
Dept: LAB | Facility: HOSPITAL | Age: 53
End: 2018-06-11
Attending: INTERNAL MEDICINE
Payer: MEDICAID

## 2018-06-11 DIAGNOSIS — E87.5 HYPERKALEMIA: ICD-10-CM

## 2018-06-11 DIAGNOSIS — Z94.4 LIVER REPLACED BY TRANSPLANT: ICD-10-CM

## 2018-06-11 LAB
ALBUMIN SERPL BCP-MCNC: 3.6 G/DL
ALP SERPL-CCNC: 136 U/L
ALT SERPL W/O P-5'-P-CCNC: 57 U/L
ANION GAP SERPL CALC-SCNC: 8 MMOL/L
AST SERPL-CCNC: 30 U/L
BASOPHILS # BLD AUTO: 0.06 K/UL
BASOPHILS NFR BLD: 1 %
BILIRUB SERPL-MCNC: 0.4 MG/DL
BUN SERPL-MCNC: 18 MG/DL
CALCIUM SERPL-MCNC: 9.7 MG/DL
CHLORIDE SERPL-SCNC: 107 MMOL/L
CO2 SERPL-SCNC: 31 MMOL/L
CREAT SERPL-MCNC: 0.8 MG/DL
DIFFERENTIAL METHOD: ABNORMAL
EOSINOPHIL # BLD AUTO: 0.3 K/UL
EOSINOPHIL NFR BLD: 5.7 %
ERYTHROCYTE [DISTWIDTH] IN BLOOD BY AUTOMATED COUNT: 14.4 %
EST. GFR  (AFRICAN AMERICAN): >60 ML/MIN/1.73 M^2
EST. GFR  (NON AFRICAN AMERICAN): >60 ML/MIN/1.73 M^2
GLUCOSE SERPL-MCNC: 81 MG/DL
HCT VFR BLD AUTO: 35.1 %
HGB BLD-MCNC: 11.1 G/DL
LYMPHOCYTES # BLD AUTO: 1.6 K/UL
LYMPHOCYTES NFR BLD: 27.8 %
MCH RBC QN AUTO: 25.9 PG
MCHC RBC AUTO-ENTMCNC: 31.6 G/DL
MCV RBC AUTO: 82 FL
MONOCYTES # BLD AUTO: 0.4 K/UL
MONOCYTES NFR BLD: 7.1 %
NEUTROPHILS # BLD AUTO: 3.4 K/UL
NEUTROPHILS NFR BLD: 58.4 %
PLATELET # BLD AUTO: 190 K/UL
PMV BLD AUTO: 9.6 FL
POTASSIUM SERPL-SCNC: 3.8 MMOL/L
POTASSIUM SERPL-SCNC: 3.8 MMOL/L
PROT SERPL-MCNC: 6.8 G/DL
RBC # BLD AUTO: 4.28 M/UL
SODIUM SERPL-SCNC: 146 MMOL/L
WBC # BLD AUTO: 5.79 K/UL

## 2018-06-11 PROCEDURE — 80053 COMPREHEN METABOLIC PANEL: CPT

## 2018-06-11 PROCEDURE — 80197 ASSAY OF TACROLIMUS: CPT

## 2018-06-11 PROCEDURE — 36415 COLL VENOUS BLD VENIPUNCTURE: CPT | Mod: PO

## 2018-06-11 PROCEDURE — 85025 COMPLETE CBC W/AUTO DIFF WBC: CPT | Mod: PO

## 2018-06-11 NOTE — TELEPHONE ENCOUNTER
Re-authorization for Zarxio has been denied.  [facility] was working on re-authorization in the instance that the patient will ever need additional refills.  Per follow up last month, she has 2-3 doses remaining at home as she has needed it infrequently.

## 2018-06-12 LAB
CMV DNA SERPL NAA+PROBE-ACNC: NORMAL IU/ML
TACROLIMUS BLD-MCNC: 6.1 NG/ML

## 2018-06-13 ENCOUNTER — TELEPHONE (OUTPATIENT)
Dept: TRANSPLANT | Facility: CLINIC | Age: 53
End: 2018-06-13

## 2018-06-13 ENCOUNTER — OFFICE VISIT (OUTPATIENT)
Dept: GASTROENTEROLOGY | Facility: CLINIC | Age: 53
End: 2018-06-13
Payer: MEDICAID

## 2018-06-13 VITALS
SYSTOLIC BLOOD PRESSURE: 140 MMHG | HEART RATE: 80 BPM | BODY MASS INDEX: 35.3 KG/M2 | DIASTOLIC BLOOD PRESSURE: 80 MMHG | WEIGHT: 191.81 LBS | HEIGHT: 62 IN

## 2018-06-13 DIAGNOSIS — I10 ESSENTIAL HYPERTENSION: ICD-10-CM

## 2018-06-13 DIAGNOSIS — R74.8 ELEVATED LIVER ENZYMES: ICD-10-CM

## 2018-06-13 DIAGNOSIS — D84.9 IMMUNOSUPPRESSION: ICD-10-CM

## 2018-06-13 DIAGNOSIS — L65.9 HAIR LOSS: ICD-10-CM

## 2018-06-13 DIAGNOSIS — G62.9 NEUROPATHY: ICD-10-CM

## 2018-06-13 DIAGNOSIS — D72.819 LEUKOPENIA, UNSPECIFIED TYPE: Primary | ICD-10-CM

## 2018-06-13 DIAGNOSIS — Z94.4 LIVER TRANSPLANTED: ICD-10-CM

## 2018-06-13 DIAGNOSIS — M25.612 DECREASED SHOULDER MOBILITY, LEFT: ICD-10-CM

## 2018-06-13 PROCEDURE — 99999 PR PBB SHADOW E&M-EST. PATIENT-LVL III: CPT | Mod: PBBFAC,,, | Performed by: INTERNAL MEDICINE

## 2018-06-13 PROCEDURE — 99213 OFFICE O/P EST LOW 20 MIN: CPT | Mod: PBBFAC,PO | Performed by: INTERNAL MEDICINE

## 2018-06-13 PROCEDURE — 99215 OFFICE O/P EST HI 40 MIN: CPT | Mod: S$PBB,,, | Performed by: INTERNAL MEDICINE

## 2018-06-13 RX ORDER — GABAPENTIN 300 MG/1
600 CAPSULE ORAL 2 TIMES DAILY
Qty: 120 CAPSULE | Refills: 5 | Status: SHIPPED | OUTPATIENT
Start: 2018-06-13 | End: 2018-11-02

## 2018-06-13 NOTE — TELEPHONE ENCOUNTER
----- Message from Joselin Souza MD sent at 6/13/2018  7:51 AM CDT -----  Liver tests continue to improve.  WBC count ok, repeat labs next week

## 2018-06-13 NOTE — TELEPHONE ENCOUNTER
Reviewed labs with Dr. Souza and the labs are stable.  Notified patient via portal that their labs are stable and to repeat labs on 6/18/18.

## 2018-06-13 NOTE — PROGRESS NOTES
Transplant Hepatology  Liver Transplant Recipient Follow-up    Transplant Date: 2017  UNOS Native Liver Dx: Alcoholic Cirrhosis    Marcie is here for follow up of her liver transplant.    ORGAN: LIVER  Whole or Partial: whole liver  Donor Type:  - brain death  CDC High Risk: no  Donor CMV Status: Negative  Donor HCV Status: Negative  Donor HBcAb: Negative  Biliary Anastomosis: end to end  Arterial Anatomy: standard  IVC reconstruction: cavaplasty piggyback  Portal vein status: patent  .    Subjective:     Interval History:  Currently, she is doing with difficulty. Current complaints include persistent issues with hair loss.  She reports that is been progressive.  She has not been able to find the appropriate dose of biotin.  She did see a dermatologist who is concerned about using any treatment agents because of her liver transplant status.  She also continues with pain and discomfort as well as numbness on the left lateral leg.  She reports she has chronic issues with left upper extremity weakness and limited mobility in the shoulder.  She says this has been moderate in intensity.  It is significantly affect her activities of daily living.  She has not been able to find a primary care doctor at this time.  Her medications have not made any big impact in terms of her symptom improvement.     More recently she had elevated liver tests.  At that time she reports she was not feeling well  And was retreated with antibiotics.    Review of Systems   HENT: Negative.    Eyes: Negative.    Respiratory: Negative.    Cardiovascular: Negative.    Gastrointestinal: Negative.    Genitourinary: Negative.    Musculoskeletal: Positive for arthralgias and myalgias.   Skin:        Hair loss   Neurological: Positive for weakness and numbness.   Psychiatric/Behavioral: Negative.        Objective:     Physical Exam   Constitutional: She is oriented to person, place, and time. She appears well-developed and well-nourished. No  distress.   HENT:   Head: Normocephalic and atraumatic.   Mouth/Throat: Oropharynx is clear and moist. No oropharyngeal exudate.   Eyes: Conjunctivae are normal. Pupils are equal, round, and reactive to light. Right eye exhibits no discharge. Left eye exhibits no discharge. No scleral icterus.   Pulmonary/Chest: Effort normal and breath sounds normal. No respiratory distress. She has no wheezes.   Abdominal: Soft. She exhibits no distension. There is no tenderness.   Musculoskeletal: She exhibits no edema.   Decreased range of motion and left arm   Neurological: She is alert and oriented to person, place, and time.   Skin:   Hair thinning   Psychiatric: She has a normal mood and affect. Her behavior is normal.   Vitals reviewed.      WBC   Date Value Ref Range Status   06/11/2018 5.79 3.90 - 12.70 K/uL Final     Hemoglobin   Date Value Ref Range Status   06/11/2018 11.1 (L) 12.0 - 16.0 g/dL Final     POC Hematocrit   Date Value Ref Range Status   12/26/2017 28 (L) 36 - 54 %PCV Final     Hematocrit   Date Value Ref Range Status   06/11/2018 35.1 (L) 37.0 - 48.5 % Final     Platelets   Date Value Ref Range Status   06/11/2018 190 150 - 350 K/uL Final     BUN, Bld   Date Value Ref Range Status   06/11/2018 18 6 - 20 mg/dL Final     Creatinine   Date Value Ref Range Status   06/11/2018 0.8 0.5 - 1.4 mg/dL Final     Glucose   Date Value Ref Range Status   06/11/2018 81 70 - 110 mg/dL Final     Calcium   Date Value Ref Range Status   06/11/2018 9.7 8.7 - 10.5 mg/dL Final     Sodium   Date Value Ref Range Status   06/11/2018 146 (H) 136 - 145 mmol/L Final     Potassium   Date Value Ref Range Status   06/11/2018 3.8 3.5 - 5.1 mmol/L Final   06/11/2018 3.8 3.5 - 5.1 mmol/L Final     Chloride   Date Value Ref Range Status   06/11/2018 107 95 - 110 mmol/L Final     Magnesium   Date Value Ref Range Status   03/17/2018 1.4 (L) 1.6 - 2.6 mg/dL Final     AST   Date Value Ref Range Status   06/11/2018 30 10 - 40 U/L Final     ALT    Date Value Ref Range Status   06/11/2018 57 (H) 10 - 44 U/L Final     Alkaline Phosphatase   Date Value Ref Range Status   06/11/2018 136 (H) 55 - 135 U/L Final     Total Bilirubin   Date Value Ref Range Status   06/11/2018 0.4 0.1 - 1.0 mg/dL Final     Comment:     For infants and newborns, interpretation of results should be based  on gestational age, weight and in agreement with clinical  observations.  Premature Infant recommended reference ranges:  Up to 24 hours.............<8.0 mg/dL  Up to 48 hours............<12.0 mg/dL  3-5 days..................<15.0 mg/dL  6-29 days.................<15.0 mg/dL       Albumin   Date Value Ref Range Status   06/11/2018 3.6 3.5 - 5.2 g/dL Final     INR   Date Value Ref Range Status   03/09/2018 1.0 0.8 - 1.2 Final     Comment:     Coumadin Therapy:  2.0 - 3.0 for INR for all indicators except mechanical heart valves  and antiphospholipid syndromes which should use 2.5 - 3.5.       Lab Results   Component Value Date    TACROLIMUS 6.1 06/11/2018           Assessment/Plan:     1. Leukopenia, unspecified type    2. Elevated liver enzymes    3. Immunosuppression    4. Liver transplanted    5. Decreased shoulder mobility, left    6. Neuropathy    7. Essential hypertension    8. Hair loss      Leukopenia, unspecified type-improved, likely related to CellCept    Elevated liver enzymes-uncertain this was related to a small amount of rejection the development CellCept had to be started or if this is related to recent infection with antibiotic treatment.  Liver tests are improved.  -continue with weekly labs    Immunosuppression  -continue current immunosuppression including current prednisone dose until labs normalize then will taper    Liver transplanted-good allograft function    Decreased shoulder mobility, left-unclear etiology but needs further evaluation  -needs primary care to assist in evaluation, will start with x-ray imaging but may need MRI for further evaluation  -May  also need physical therapy of upper extremity  -     gabapentin (NEURONTIN) 300 MG capsule; Take 2 capsules (600 mg total) by mouth 2 (two) times daily.  Dispense: 120 capsule; Refill: 5  -     Ambulatory Referral to Internal Medicine  -     X-Ray Shoulder Trauma 3 view Left; Future; Expected date: 06/13/2018    Neuropathy-uncontrolled  -will increase Neurontin  -     gabapentin (NEURONTIN) 300 MG capsule; Take 2 capsules (600 mg total) by mouth 2 (two) times daily.  Dispense: 120 capsule; Refill: 5  -     Ambulatory Referral to Internal Medicine    Essential hypertension-slightly elevated  -continue to monitor  -     Ambulatory Referral to Internal Medicine    Hair loss-suspect multifactorial given medical issues and possibly related to medications  -evaluate talk with pharmacist about seeing where she can find a biotin at the appropriate dose    Return to clinic in about 8 weeks    Patient was seen in the liver transplant department at [facility].      MD JONO Marie Patient Status  Functional Status: 60% - Requires occasional assistance but is able to care for needs  Physical Capacity: Limited Mobility

## 2018-06-14 ENCOUNTER — TELEPHONE (OUTPATIENT)
Dept: TRANSPLANT | Facility: CLINIC | Age: 53
End: 2018-06-14

## 2018-06-14 ENCOUNTER — TELEPHONE (OUTPATIENT)
Dept: RADIOLOGY | Facility: HOSPITAL | Age: 53
End: 2018-06-14

## 2018-06-14 NOTE — TELEPHONE ENCOUNTER
Notified patient after routine clinic visit with Dr. Souza to continue with weekly labs and return to clinic in 8 weeks -8/6-8/10.

## 2018-06-15 ENCOUNTER — TELEPHONE (OUTPATIENT)
Dept: TRANSPLANT | Facility: CLINIC | Age: 53
End: 2018-06-15

## 2018-06-15 DIAGNOSIS — Z94.4 LIVER REPLACED BY TRANSPLANT: Primary | ICD-10-CM

## 2018-06-15 NOTE — TELEPHONE ENCOUNTER
Contacted patient about 6month follow up US that was cancelled 6/15/18.  Patient had clinic visit with Dr. Souza 6/13/18.  Patient had her 6 month US early on 5/4/18- will schedule her 1 year follow up.

## 2018-06-18 ENCOUNTER — LAB VISIT (OUTPATIENT)
Dept: LAB | Facility: HOSPITAL | Age: 53
End: 2018-06-18
Attending: INTERNAL MEDICINE
Payer: MEDICAID

## 2018-06-18 DIAGNOSIS — E87.5 HYPERKALEMIA: ICD-10-CM

## 2018-06-18 DIAGNOSIS — Z94.4 LIVER REPLACED BY TRANSPLANT: ICD-10-CM

## 2018-06-18 LAB
ALBUMIN SERPL BCP-MCNC: 4 G/DL
ALP SERPL-CCNC: 147 U/L
ALT SERPL W/O P-5'-P-CCNC: 96 U/L
ANION GAP SERPL CALC-SCNC: 8 MMOL/L
AST SERPL-CCNC: 51 U/L
BASOPHILS # BLD AUTO: 0.04 K/UL
BASOPHILS NFR BLD: 0.7 %
BILIRUB SERPL-MCNC: 0.5 MG/DL
BUN SERPL-MCNC: 15 MG/DL
CALCIUM SERPL-MCNC: 9.5 MG/DL
CHLORIDE SERPL-SCNC: 102 MMOL/L
CO2 SERPL-SCNC: 31 MMOL/L
CREAT SERPL-MCNC: 0.8 MG/DL
DIFFERENTIAL METHOD: ABNORMAL
EOSINOPHIL # BLD AUTO: 0.5 K/UL
EOSINOPHIL NFR BLD: 8 %
ERYTHROCYTE [DISTWIDTH] IN BLOOD BY AUTOMATED COUNT: 14.4 %
EST. GFR  (AFRICAN AMERICAN): >60 ML/MIN/1.73 M^2
EST. GFR  (NON AFRICAN AMERICAN): >60 ML/MIN/1.73 M^2
GLUCOSE SERPL-MCNC: 72 MG/DL
HCT VFR BLD AUTO: 37.4 %
HGB BLD-MCNC: 11.9 G/DL
LYMPHOCYTES # BLD AUTO: 1.9 K/UL
LYMPHOCYTES NFR BLD: 31 %
MCH RBC QN AUTO: 26.2 PG
MCHC RBC AUTO-ENTMCNC: 31.8 G/DL
MCV RBC AUTO: 82 FL
MONOCYTES # BLD AUTO: 0 K/UL
MONOCYTES NFR BLD: 0.5 %
NEUTROPHILS # BLD AUTO: 3.6 K/UL
NEUTROPHILS NFR BLD: 59.8 %
PLATELET # BLD AUTO: 183 K/UL
PMV BLD AUTO: 10.1 FL
POTASSIUM SERPL-SCNC: 3.7 MMOL/L
POTASSIUM SERPL-SCNC: 3.7 MMOL/L
PROT SERPL-MCNC: 7.4 G/DL
RBC # BLD AUTO: 4.54 M/UL
SODIUM SERPL-SCNC: 141 MMOL/L
WBC # BLD AUTO: 5.97 K/UL

## 2018-06-18 PROCEDURE — 36415 COLL VENOUS BLD VENIPUNCTURE: CPT | Mod: PO

## 2018-06-18 PROCEDURE — 80197 ASSAY OF TACROLIMUS: CPT

## 2018-06-18 PROCEDURE — 85025 COMPLETE CBC W/AUTO DIFF WBC: CPT | Mod: PO

## 2018-06-18 PROCEDURE — 80053 COMPREHEN METABOLIC PANEL: CPT

## 2018-06-19 ENCOUNTER — CLINICAL SUPPORT (OUTPATIENT)
Dept: REHABILITATION | Facility: HOSPITAL | Age: 53
End: 2018-06-19
Payer: MEDICAID

## 2018-06-19 DIAGNOSIS — M54.32 SCIATICA, LEFT SIDE: ICD-10-CM

## 2018-06-19 DIAGNOSIS — R53.81 PHYSICAL DECONDITIONING: Primary | ICD-10-CM

## 2018-06-19 DIAGNOSIS — R53.1 WEAKNESS: ICD-10-CM

## 2018-06-19 LAB
CMV DNA SERPL NAA+PROBE-ACNC: NORMAL IU/ML
TACROLIMUS BLD-MCNC: 5.5 NG/ML

## 2018-06-19 PROCEDURE — 97110 THERAPEUTIC EXERCISES: CPT

## 2018-06-19 PROCEDURE — 97164 PT RE-EVAL EST PLAN CARE: CPT

## 2018-06-19 NOTE — PROGRESS NOTES
PHYSICAL THERAPY RE-EVALUATION    Referring Provider:  Devon Robles    Diagnosis:         ICD-10-CM ICD-9-CM    1. Physical deconditioning R53.81 799.3    2. Sciatica, left side M54.32 724.3    3. Weakness R53.1 780.79        Orders:  Evaluate and Treat    Date of Initial Evaluation: 1/16/18    Visit # 9 of 20    BACKGROUND:  Patient returns to PT after 1 month off due to medical issues.  She c/o continued weakness in her L LE and decreased movement in her L shoulder.    OBJECTIVE:  Updated 6/19    Gait: Pt ambulates with mild gait deviations at this time.     ROM:  WNL bilateral LEs,  L shoulder ROM limited by 30% in flex and abduction        Strength:    Strength Right Left   Hip Flexion 4/5 3-/5   Knee Extension 4/5 3-/5   Ankle DF 4/5 3/5   Knee Flexors 4/5 3/5      Function:  Patient reports 53% impairment on the Lower Extremity Functional Scale (LEFS).     Short Term GOALS: 3 weeks.   1. Pt will walk 150 feet with or without AD independently without need for rest break in order to demonstrate increased independence and endurance for functional mobility around the home  PARTIALLY MET  2. Pt will increase strength to 3+/5 or greater in bilateral LEs.  PARTIALLY MET IN R LE. NOT MET IN L LE  3. Pt will perform 45 min of therex without need for rest break in order to show increased endurance to standing position required for household activities.  PARTIALLY MET  4. Pt will be I with HEP.  PARTIALLY MET     Long Term GOALS: 8-10  weeks. .  1. Pt will ambulate 300 feet independently without rest break in order to show increased independence and endurance to functional mobility in home and community  NOT MET  2. Pt will increase strength to >/= 4/5 MMT grade in BLE in order to perform ADLs without difficulty. MET IN R LET  3. Pt will perform 20 min of standing therex without need for rest break in order to show increased endurance to standing position required for cooking at home  PARTIALLY MET  3. Pt's goal:   Independent with all ADLs and mobility, be able to cook for family  NOT MET       TREATMENT PROVIDED:  -Re-evaluation - 10 min  -Therapeutic Exercise:  30 min   - Sciatic n glides with assist - 20x   - Bridging - 40x   - DKTC/LTRs with ball - 30x   - Hip abd/add with resistance- 30x   - Bike - 5'   - UBE - 3/3'  Fw/bw    ASSESSMENT:  Patient with some regression since last evaluation due to medical issues stemming from complications from her liver transplant.  She continues to display significant weakness in her L LE and has experienced decreased strength and ROM in her L UE as well, possibly due to neuropathy.  She would benefit from more consistent therapy to improve her strength and mobility.    PLAN:  Patient will benefit from physical therapy (2-3) x/week for (4-6) weeks including therapeutic exercise, functional activities, neuromuscular re-education, gait training, modalities, and patient education.

## 2018-06-20 ENCOUNTER — TELEPHONE (OUTPATIENT)
Dept: RADIOLOGY | Facility: HOSPITAL | Age: 53
End: 2018-06-20

## 2018-06-20 DIAGNOSIS — Z94.4 LIVER REPLACED BY TRANSPLANT: ICD-10-CM

## 2018-06-20 NOTE — TELEPHONE ENCOUNTER
Notified patient via Phone that their labs were Reviewed with Harley and we need to change your prograf to 4 mg by mouth every am and 4 mg by mouth every pm. Restart Cellcept by mouth 250 mg every am and every pm Please repeat labs Friday 6/22/18

## 2018-06-20 NOTE — TELEPHONE ENCOUNTER
----- Message from Joselin Souza MD sent at 6/20/2018  1:29 PM CDT -----  Liver tests trending up.  Tacrolimus level is low.  Increase tacrolimus to 4 mg twice a day restart low-dose CellCept that 250 mg twice a day and repeat labs Friday.  These labs will need to be reviewed by the hepatologist on call mainly to make sure that liver tests are not continuing to trend up significantly.

## 2018-06-21 ENCOUNTER — HOSPITAL ENCOUNTER (OUTPATIENT)
Dept: RADIOLOGY | Facility: HOSPITAL | Age: 53
Discharge: HOME | End: 2018-06-21
Attending: INTERNAL MEDICINE
Payer: MEDICAID

## 2018-06-21 DIAGNOSIS — Z94.4 LIVER REPLACED BY TRANSPLANT: ICD-10-CM

## 2018-06-21 PROCEDURE — 93975 VASCULAR STUDY: CPT | Mod: TC,PO

## 2018-06-21 PROCEDURE — 76705 ECHO EXAM OF ABDOMEN: CPT | Mod: TC,PO

## 2018-06-21 PROCEDURE — 93975 VASCULAR STUDY: CPT | Mod: 26,,, | Performed by: RADIOLOGY

## 2018-06-21 PROCEDURE — 76705 ECHO EXAM OF ABDOMEN: CPT | Mod: 26,XS,, | Performed by: RADIOLOGY

## 2018-06-22 ENCOUNTER — LAB VISIT (OUTPATIENT)
Dept: LAB | Facility: HOSPITAL | Age: 53
End: 2018-06-22
Attending: INTERNAL MEDICINE
Payer: MEDICAID

## 2018-06-22 ENCOUNTER — CLINICAL SUPPORT (OUTPATIENT)
Dept: REHABILITATION | Facility: HOSPITAL | Age: 53
End: 2018-06-22
Payer: MEDICAID

## 2018-06-22 DIAGNOSIS — M54.32 SCIATICA, LEFT SIDE: ICD-10-CM

## 2018-06-22 DIAGNOSIS — R53.81 PHYSICAL DECONDITIONING: Primary | ICD-10-CM

## 2018-06-22 DIAGNOSIS — Z94.4 LIVER REPLACED BY TRANSPLANT: ICD-10-CM

## 2018-06-22 DIAGNOSIS — R53.1 WEAKNESS: ICD-10-CM

## 2018-06-22 LAB
ALBUMIN SERPL BCP-MCNC: 4 G/DL
ALP SERPL-CCNC: 149 U/L
ALT SERPL W/O P-5'-P-CCNC: 110 U/L
ANION GAP SERPL CALC-SCNC: 10 MMOL/L
AST SERPL-CCNC: 41 U/L
BASOPHILS # BLD AUTO: 0.04 K/UL
BASOPHILS NFR BLD: 0.7 %
BILIRUB SERPL-MCNC: 0.5 MG/DL
BUN SERPL-MCNC: 23 MG/DL
CALCIUM SERPL-MCNC: 9.8 MG/DL
CHLORIDE SERPL-SCNC: 105 MMOL/L
CO2 SERPL-SCNC: 29 MMOL/L
CREAT SERPL-MCNC: 0.8 MG/DL
DIFFERENTIAL METHOD: ABNORMAL
EOSINOPHIL # BLD AUTO: 0.2 K/UL
EOSINOPHIL NFR BLD: 3.5 %
ERYTHROCYTE [DISTWIDTH] IN BLOOD BY AUTOMATED COUNT: 14.5 %
EST. GFR  (AFRICAN AMERICAN): >60 ML/MIN/1.73 M^2
EST. GFR  (NON AFRICAN AMERICAN): >60 ML/MIN/1.73 M^2
GLUCOSE SERPL-MCNC: 89 MG/DL
HCT VFR BLD AUTO: 37.8 %
HGB BLD-MCNC: 11.8 G/DL
LYMPHOCYTES # BLD AUTO: 1.6 K/UL
LYMPHOCYTES NFR BLD: 26.8 %
MCH RBC QN AUTO: 25.5 PG
MCHC RBC AUTO-ENTMCNC: 31.2 G/DL
MCV RBC AUTO: 82 FL
MONOCYTES # BLD AUTO: 0.5 K/UL
MONOCYTES NFR BLD: 8.4 %
NEUTROPHILS # BLD AUTO: 3.6 K/UL
NEUTROPHILS NFR BLD: 60.6 %
PLATELET # BLD AUTO: 179 K/UL
PMV BLD AUTO: 9.7 FL
POTASSIUM SERPL-SCNC: 4.1 MMOL/L
PROT SERPL-MCNC: 7.3 G/DL
RBC # BLD AUTO: 4.63 M/UL
SODIUM SERPL-SCNC: 144 MMOL/L
WBC # BLD AUTO: 5.98 K/UL

## 2018-06-22 PROCEDURE — 97110 THERAPEUTIC EXERCISES: CPT

## 2018-06-22 PROCEDURE — 80197 ASSAY OF TACROLIMUS: CPT

## 2018-06-22 PROCEDURE — 36415 COLL VENOUS BLD VENIPUNCTURE: CPT | Mod: PO

## 2018-06-22 PROCEDURE — 80053 COMPREHEN METABOLIC PANEL: CPT | Mod: PO

## 2018-06-22 PROCEDURE — 85025 COMPLETE CBC W/AUTO DIFF WBC: CPT | Mod: PO

## 2018-06-22 RX ORDER — TACROLIMUS 1 MG/1
4 CAPSULE ORAL EVERY 12 HOURS
Qty: 180 CAPSULE | Refills: 11 | Status: SHIPPED | OUTPATIENT
Start: 2018-06-22 | End: 2018-06-22 | Stop reason: SDUPTHER

## 2018-06-22 RX ORDER — MYCOPHENOLATE MOFETIL 250 MG/1
250 CAPSULE ORAL 2 TIMES DAILY
Qty: 60 CAPSULE | Refills: 11 | Status: SHIPPED | OUTPATIENT
Start: 2018-06-22 | End: 2018-06-27 | Stop reason: SDUPTHER

## 2018-06-22 NOTE — PROGRESS NOTES
PHYSICAL THERAPY DAILY NOTE    Referring Provider:  Devon Robles    Diagnosis:         ICD-10-CM ICD-9-CM    1. Physical deconditioning R53.81 799.3    2. Sciatica, left side M54.32 724.3    3. Weakness R53.1 780.79        Orders:  Evaluate and Treat    Date of Initial Evaluation: 1/16/18    Visit # 10 of 20    BACKGROUND:  Patient returns to PT after 1 month off due to medical issues.  She c/o continued weakness in her L LE and decreased movement in her L shoulder.    Subjective - 6/22/18 - Patient reports feeling pretty good today.    OBJECTIVE:  Updated 6/19    TREATMENT PROVIDED:  -Therapeutic Exercise:  40 min   - Sciatic n glides with assist - 20x   - Bridging - 40x   - DKTC/LTRs with ball - 30x   - Hip abd/add with resistance- 30x   - Bike - 5'   - UBE - 3/3'  Fw/bw   - SL TG squats - 5 x 10  2 bands (L and R LE)    ASSESSMENT:  Patient tolerated therex well but still with weakness on L side.  Continued PT needed to improve strength and mobility.    PLAN:  Patient will benefit from physical therapy (2-3) x/week for (4-6) weeks including therapeutic exercise, functional activities, neuromuscular re-education, gait training, modalities, and patient education.

## 2018-06-23 LAB — TACROLIMUS BLD-MCNC: 7 NG/ML

## 2018-06-25 ENCOUNTER — CLINICAL SUPPORT (OUTPATIENT)
Dept: REHABILITATION | Facility: HOSPITAL | Age: 53
End: 2018-06-25
Payer: MEDICAID

## 2018-06-25 ENCOUNTER — LAB VISIT (OUTPATIENT)
Dept: LAB | Facility: HOSPITAL | Age: 53
End: 2018-06-25
Attending: INTERNAL MEDICINE
Payer: MEDICAID

## 2018-06-25 DIAGNOSIS — M54.32 SCIATICA, LEFT SIDE: ICD-10-CM

## 2018-06-25 DIAGNOSIS — R53.1 WEAKNESS: ICD-10-CM

## 2018-06-25 DIAGNOSIS — R53.81 PHYSICAL DECONDITIONING: Primary | ICD-10-CM

## 2018-06-25 DIAGNOSIS — Z94.4 LIVER REPLACED BY TRANSPLANT: ICD-10-CM

## 2018-06-25 LAB
ALBUMIN SERPL BCP-MCNC: 3.7 G/DL
ALP SERPL-CCNC: 124 U/L
ALT SERPL W/O P-5'-P-CCNC: 65 U/L
ANION GAP SERPL CALC-SCNC: 9 MMOL/L
AST SERPL-CCNC: 21 U/L
BASOPHILS # BLD AUTO: 0.03 K/UL
BASOPHILS NFR BLD: 0.4 %
BILIRUB SERPL-MCNC: 0.3 MG/DL
BUN SERPL-MCNC: 19 MG/DL
CALCIUM SERPL-MCNC: 9.3 MG/DL
CHLORIDE SERPL-SCNC: 105 MMOL/L
CO2 SERPL-SCNC: 28 MMOL/L
CREAT SERPL-MCNC: 0.8 MG/DL
DIFFERENTIAL METHOD: ABNORMAL
EOSINOPHIL # BLD AUTO: 0.1 K/UL
EOSINOPHIL NFR BLD: 1.9 %
ERYTHROCYTE [DISTWIDTH] IN BLOOD BY AUTOMATED COUNT: 14.5 %
EST. GFR  (AFRICAN AMERICAN): >60 ML/MIN/1.73 M^2
EST. GFR  (NON AFRICAN AMERICAN): >60 ML/MIN/1.73 M^2
GLUCOSE SERPL-MCNC: 82 MG/DL
HCT VFR BLD AUTO: 36 %
HGB BLD-MCNC: 11.4 G/DL
LYMPHOCYTES # BLD AUTO: 1.7 K/UL
LYMPHOCYTES NFR BLD: 25.2 %
MCH RBC QN AUTO: 25.8 PG
MCHC RBC AUTO-ENTMCNC: 31.7 G/DL
MCV RBC AUTO: 81 FL
MONOCYTES # BLD AUTO: 0.6 K/UL
MONOCYTES NFR BLD: 9 %
NEUTROPHILS # BLD AUTO: 4.3 K/UL
NEUTROPHILS NFR BLD: 63.5 %
PLATELET # BLD AUTO: 177 K/UL
PMV BLD AUTO: 9.7 FL
POTASSIUM SERPL-SCNC: 4.1 MMOL/L
PROT SERPL-MCNC: 6.6 G/DL
RBC # BLD AUTO: 4.42 M/UL
SODIUM SERPL-SCNC: 142 MMOL/L
WBC # BLD AUTO: 6.79 K/UL

## 2018-06-25 PROCEDURE — 85025 COMPLETE CBC W/AUTO DIFF WBC: CPT | Mod: PO

## 2018-06-25 PROCEDURE — 36415 COLL VENOUS BLD VENIPUNCTURE: CPT | Mod: PO

## 2018-06-25 PROCEDURE — 80197 ASSAY OF TACROLIMUS: CPT

## 2018-06-25 PROCEDURE — 80053 COMPREHEN METABOLIC PANEL: CPT | Mod: PO

## 2018-06-25 PROCEDURE — 97110 THERAPEUTIC EXERCISES: CPT

## 2018-06-25 NOTE — PROGRESS NOTES
PHYSICAL THERAPY DAILY NOTE    Referring Provider:  Devon Robles    Diagnosis:         ICD-10-CM ICD-9-CM    1. Physical deconditioning R53.81 799.3    2. Sciatica, left side M54.32 724.3    3. Weakness R53.1 780.79        Orders:  Evaluate and Treat    Date of Initial Evaluation: 1/16/18    Visit # 11 of 20    BACKGROUND:  Patient returns to PT after 1 month off due to medical issues.  She c/o continued weakness in her L LE and decreased movement in her L shoulder.    Subjective - 6/25/18 - Patient reports that she's doing pretty good and not having much pain.  Still with weakness on her L side.    OBJECTIVE:  Updated 6/19    TREATMENT PROVIDED:  -Therapeutic Exercise:  40 min   - Sciatic n glides with assist - 20x   - Bridging - 40x   - DKTC/LTRs with ball - 30x   - Hip abd/add with resistance- 30x   - Bike - 5'   - UBE - 3/3'  Fw/bw   - SL TG squats - 4 x 10  3 bands    ASSESSMENT:  Patient tolerated therex well.  Patient with improved overall strength and endurance for activities.    PLAN:  Patient will benefit from physical therapy (2-3) x/week for (4-6) weeks including therapeutic exercise, functional activities, neuromuscular re-education, gait training, modalities, and patient education.

## 2018-06-26 LAB — TACROLIMUS BLD-MCNC: 7.5 NG/ML

## 2018-06-27 NOTE — TELEPHONE ENCOUNTER
----- Message from Joselin Souza MD sent at 6/27/2018 10:47 AM CDT -----  Liver tests continue to improve.  Increase CellCept to 500 mg twice a day so that we can see a prednisone can be weaned.  Repeat labs next week.

## 2018-06-27 NOTE — TELEPHONE ENCOUNTER
Notified patient via telephone that labs continue to improve.  We need to increase CellCept to 500 mg by mouth twice a day.  Repeat labs 7/2/18  Patient states understanding

## 2018-06-28 ENCOUNTER — TELEPHONE (OUTPATIENT)
Dept: TRANSPLANT | Facility: CLINIC | Age: 53
End: 2018-06-28

## 2018-06-28 RX ORDER — MYCOPHENOLATE MOFETIL 250 MG/1
500 CAPSULE ORAL 2 TIMES DAILY
Qty: 120 CAPSULE | Refills: 11 | Status: SHIPPED | OUTPATIENT
Start: 2018-06-28 | End: 2019-02-07 | Stop reason: SDUPTHER

## 2018-06-28 RX ORDER — TACROLIMUS 1 MG/1
4 CAPSULE ORAL EVERY 12 HOURS
Qty: 240 CAPSULE | Refills: 11 | Status: SHIPPED | OUTPATIENT
Start: 2018-06-28 | End: 2018-07-05 | Stop reason: SDUPTHER

## 2018-06-28 NOTE — LETTER
June 28, 2018    Marcie Bazan  [address] [facility] [address]          Dear Marcie Bazan:  MRN: [id number]    Your Ultrasound results were stable.  There are no medicine changes and no additional procedures are needed at this time.     If you cannot have your labs drawn on the scheduled date, it is your responsibility to call the transplant department to reschedule.  To reschedule or make an appointment, please as to speak to or leave a message for my assistant, Corrie Neri, at [phone].  When leaving a message for Halle Denton, or myself, we ask that you leave a brief message regarding your request.    Sincerely,  Judie Vallecillo, MSN, RNBC, CCTN      Your Liver Transplant Coordinator    [facility]  [address] [place] [phone]

## 2018-06-28 NOTE — TELEPHONE ENCOUNTER
----- Message from Joselin Souza MD sent at 6/28/2018  4:19 PM CDT -----  Reviewed, nothing to do

## 2018-06-28 NOTE — TELEPHONE ENCOUNTER
Notified patient via letter that Dr. Souza reviewed their US and it is stable.  There are no medication changes needed and no additional procedures needed at this time.  Please contact the transplant clinic if you have questions [phone]

## 2018-07-02 ENCOUNTER — LAB VISIT (OUTPATIENT)
Dept: LAB | Facility: HOSPITAL | Age: 53
End: 2018-07-02
Attending: INTERNAL MEDICINE
Payer: MEDICAID

## 2018-07-02 ENCOUNTER — CLINICAL SUPPORT (OUTPATIENT)
Dept: REHABILITATION | Facility: HOSPITAL | Age: 53
End: 2018-07-02
Payer: COMMERCIAL

## 2018-07-02 DIAGNOSIS — R53.1 WEAKNESS: ICD-10-CM

## 2018-07-02 DIAGNOSIS — Z94.4 LIVER REPLACED BY TRANSPLANT: ICD-10-CM

## 2018-07-02 DIAGNOSIS — M54.32 SCIATICA, LEFT SIDE: ICD-10-CM

## 2018-07-02 DIAGNOSIS — R53.81 PHYSICAL DECONDITIONING: Primary | ICD-10-CM

## 2018-07-02 DIAGNOSIS — E83.42 HYPOMAGNESEMIA: ICD-10-CM

## 2018-07-02 DIAGNOSIS — E83.42 HYPOMAGNESEMIA: Primary | ICD-10-CM

## 2018-07-02 LAB
ALBUMIN SERPL BCP-MCNC: 3.7 G/DL
ALP SERPL-CCNC: 121 U/L
ALT SERPL W/O P-5'-P-CCNC: 37 U/L
ANION GAP SERPL CALC-SCNC: 7 MMOL/L
AST SERPL-CCNC: 17 U/L
BASOPHILS # BLD AUTO: 0.04 K/UL
BASOPHILS NFR BLD: 0.5 %
BILIRUB SERPL-MCNC: 0.4 MG/DL
BUN SERPL-MCNC: 16 MG/DL
CALCIUM SERPL-MCNC: 9.3 MG/DL
CHLORIDE SERPL-SCNC: 104 MMOL/L
CO2 SERPL-SCNC: 31 MMOL/L
CREAT SERPL-MCNC: 0.8 MG/DL
DIFFERENTIAL METHOD: ABNORMAL
EOSINOPHIL # BLD AUTO: 0.1 K/UL
EOSINOPHIL NFR BLD: 1.6 %
ERYTHROCYTE [DISTWIDTH] IN BLOOD BY AUTOMATED COUNT: 14.7 %
EST. GFR  (AFRICAN AMERICAN): >60 ML/MIN/1.73 M^2
EST. GFR  (NON AFRICAN AMERICAN): >60 ML/MIN/1.73 M^2
GLUCOSE SERPL-MCNC: 79 MG/DL
HCT VFR BLD AUTO: 36 %
HGB BLD-MCNC: 11.2 G/DL
LYMPHOCYTES # BLD AUTO: 1.8 K/UL
LYMPHOCYTES NFR BLD: 24.3 %
MAGNESIUM SERPL-MCNC: 1.9 MG/DL
MCH RBC QN AUTO: 25.3 PG
MCHC RBC AUTO-ENTMCNC: 31.1 G/DL
MCV RBC AUTO: 81 FL
MONOCYTES # BLD AUTO: 0.6 K/UL
MONOCYTES NFR BLD: 8.5 %
NEUTROPHILS # BLD AUTO: 4.8 K/UL
NEUTROPHILS NFR BLD: 65.1 %
PLATELET # BLD AUTO: 172 K/UL
PMV BLD AUTO: 10.2 FL
POTASSIUM SERPL-SCNC: 3.8 MMOL/L
PROT SERPL-MCNC: 6.6 G/DL
RBC # BLD AUTO: 4.43 M/UL
SODIUM SERPL-SCNC: 142 MMOL/L
WBC # BLD AUTO: 7.33 K/UL

## 2018-07-02 PROCEDURE — 80197 ASSAY OF TACROLIMUS: CPT

## 2018-07-02 PROCEDURE — 83735 ASSAY OF MAGNESIUM: CPT

## 2018-07-02 PROCEDURE — 36415 COLL VENOUS BLD VENIPUNCTURE: CPT | Mod: PO

## 2018-07-02 PROCEDURE — 80053 COMPREHEN METABOLIC PANEL: CPT

## 2018-07-02 PROCEDURE — 85025 COMPLETE CBC W/AUTO DIFF WBC: CPT | Mod: PO

## 2018-07-02 PROCEDURE — 97110 THERAPEUTIC EXERCISES: CPT

## 2018-07-02 RX ORDER — LANOLIN ALCOHOL/MO/W.PET/CERES
CREAM (GRAM) TOPICAL
Qty: 120 TABLET | Refills: 0 | OUTPATIENT
Start: 2018-07-02

## 2018-07-02 NOTE — PROGRESS NOTES
PHYSICAL THERAPY DAILY NOTE    Referring Provider:  Devon Robles    Diagnosis:         ICD-10-CM ICD-9-CM    1. Physical deconditioning R53.81 799.3    2. Sciatica, left side M54.32 724.3    3. Weakness R53.1 780.79        Orders:  Evaluate and Treat    Date of Initial Evaluation: 1/16/18    Visit # 12 of 20    BACKGROUND:  Patient returns to PT after 1 month off due to medical issues.  She c/o continued weakness in her L LE and decreased movement in her L shoulder.    Subjective 7/2/18 - Patient reports feeling good today.    OBJECTIVE:  Updated 6/19    TREATMENT PROVIDED:  -Therapeutic Exercise:  40 min   - Sciatic n glides with assist - 20x   - Bridging - 40x   - DKTC/LTRs with ball - 30x   - Hip abd/add with resistance- 3 x 10  25#   - HS curls - 3 x 10  25#   - Bike - 5'   - UBE - 3/3'  Fw/bw   - DL TG squats - 4 x 10  4 bands   - Stairs (4 steps) - Up and down x 2 reps with SBA and bilateral rails using step to gait pattern with strong leg leading    ASSESSMENT:  Patient tolerated increased therex well with mild increase in fatigue.  Continued PT needed to improve functional mobility, especially stair climbing.    PLAN:  Patient will benefit from physical therapy (2-3) x/week for (4-6) weeks including therapeutic exercise, functional activities, neuromuscular re-education, gait training, modalities, and patient education.

## 2018-07-03 LAB — TACROLIMUS BLD-MCNC: 3.2 NG/ML

## 2018-07-04 RX ORDER — LANOLIN ALCOHOL/MO/W.PET/CERES
CREAM (GRAM) TOPICAL
Qty: 120 TABLET | Refills: 0 | Status: SHIPPED | OUTPATIENT
Start: 2018-07-04 | End: 2018-07-05 | Stop reason: SDUPTHER

## 2018-07-05 DIAGNOSIS — Z94.4 LIVER REPLACED BY TRANSPLANT: ICD-10-CM

## 2018-07-05 DIAGNOSIS — E83.42 HYPOMAGNESEMIA: Primary | ICD-10-CM

## 2018-07-05 RX ORDER — LANOLIN ALCOHOL/MO/W.PET/CERES
2 CREAM (GRAM) TOPICAL 2 TIMES DAILY
Qty: 120 TABLET | Refills: 4 | Status: SHIPPED | OUTPATIENT
Start: 2018-07-05 | End: 2018-09-14

## 2018-07-05 RX ORDER — TACROLIMUS 1 MG/1
CAPSULE ORAL
Qty: 270 CAPSULE | Refills: 11 | Status: SHIPPED | OUTPATIENT
Start: 2018-07-05 | End: 2018-08-29

## 2018-07-05 NOTE — TELEPHONE ENCOUNTER
----- Message from Joselin Souza MD sent at 7/4/2018  2:48 PM CDT -----  Liver tests are normal but tacrolimus level is still low for unclear reasons.  Increase dose to 5 mg in the morning and 4 mg in the evening.  Repeat labs next week.  Make sure patient is compliant with medication and timing.

## 2018-07-05 NOTE — TELEPHONE ENCOUNTER
Dr. Souza reviewed your labs.  Called patient to let her know increase to 5 mg in the morning and 4 mg in the evening.  She stated, she takes at the same time.  Patient repeated and verbalize understanding.

## 2018-07-09 ENCOUNTER — LAB VISIT (OUTPATIENT)
Dept: LAB | Facility: HOSPITAL | Age: 53
End: 2018-07-09
Attending: INTERNAL MEDICINE
Payer: MEDICAID

## 2018-07-09 DIAGNOSIS — Z94.4 LIVER REPLACED BY TRANSPLANT: ICD-10-CM

## 2018-07-09 DIAGNOSIS — E83.42 HYPOMAGNESEMIA: ICD-10-CM

## 2018-07-09 LAB
ALBUMIN SERPL BCP-MCNC: 3.5 G/DL
ALP SERPL-CCNC: 112 U/L
ALT SERPL W/O P-5'-P-CCNC: 27 U/L
ANION GAP SERPL CALC-SCNC: 11 MMOL/L
AST SERPL-CCNC: 16 U/L
BASOPHILS # BLD AUTO: 0.01 K/UL
BASOPHILS NFR BLD: 0.1 %
BILIRUB SERPL-MCNC: 0.4 MG/DL
BUN SERPL-MCNC: 26 MG/DL
CALCIUM SERPL-MCNC: 9.4 MG/DL
CHLORIDE SERPL-SCNC: 105 MMOL/L
CO2 SERPL-SCNC: 25 MMOL/L
CREAT SERPL-MCNC: 0.8 MG/DL
DIFFERENTIAL METHOD: ABNORMAL
EOSINOPHIL # BLD AUTO: 0.1 K/UL
EOSINOPHIL NFR BLD: 1.4 %
ERYTHROCYTE [DISTWIDTH] IN BLOOD BY AUTOMATED COUNT: 14.4 %
EST. GFR  (AFRICAN AMERICAN): >60 ML/MIN/1.73 M^2
EST. GFR  (NON AFRICAN AMERICAN): >60 ML/MIN/1.73 M^2
GLUCOSE SERPL-MCNC: 73 MG/DL
HCT VFR BLD AUTO: 35 %
HGB BLD-MCNC: 10.8 G/DL
LYMPHOCYTES # BLD AUTO: 1.9 K/UL
LYMPHOCYTES NFR BLD: 25.1 %
MAGNESIUM SERPL-MCNC: 1.9 MG/DL
MCH RBC QN AUTO: 24.5 PG
MCHC RBC AUTO-ENTMCNC: 30.9 G/DL
MCV RBC AUTO: 79 FL
MONOCYTES # BLD AUTO: 0.5 K/UL
MONOCYTES NFR BLD: 6.4 %
NEUTROPHILS # BLD AUTO: 5.1 K/UL
NEUTROPHILS NFR BLD: 67 %
PLATELET # BLD AUTO: 179 K/UL
PMV BLD AUTO: 10 FL
POTASSIUM SERPL-SCNC: 4.2 MMOL/L
PROT SERPL-MCNC: 6.6 G/DL
RBC # BLD AUTO: 4.41 M/UL
SODIUM SERPL-SCNC: 141 MMOL/L
WBC # BLD AUTO: 7.6 K/UL

## 2018-07-09 PROCEDURE — 36415 COLL VENOUS BLD VENIPUNCTURE: CPT | Mod: PO

## 2018-07-09 PROCEDURE — 80197 ASSAY OF TACROLIMUS: CPT

## 2018-07-09 PROCEDURE — 85025 COMPLETE CBC W/AUTO DIFF WBC: CPT | Mod: PO

## 2018-07-09 PROCEDURE — 83735 ASSAY OF MAGNESIUM: CPT | Mod: PO

## 2018-07-09 PROCEDURE — 80053 COMPREHEN METABOLIC PANEL: CPT

## 2018-07-10 LAB — TACROLIMUS BLD-MCNC: 8.2 NG/ML

## 2018-07-11 ENCOUNTER — TELEPHONE (OUTPATIENT)
Dept: TRANSPLANT | Facility: CLINIC | Age: 53
End: 2018-07-11

## 2018-07-11 LAB — CMV DNA SERPL NAA+PROBE-ACNC: NORMAL IU/ML

## 2018-07-11 NOTE — TELEPHONE ENCOUNTER
Dr. Souza reviewed your labs.  Continue routine labs no changes needed.  Sent patient message via portal to repeat labs in 2 weeks on 07/23/18

## 2018-07-11 NOTE — TELEPHONE ENCOUNTER
----- Message from Joselin Souza MD sent at 7/11/2018  2:08 PM CDT -----  Tacrolimus level improved and labs improved repeat labs in 2 weeks

## 2018-07-12 ENCOUNTER — CLINICAL SUPPORT (OUTPATIENT)
Dept: REHABILITATION | Facility: HOSPITAL | Age: 53
End: 2018-07-12
Payer: COMMERCIAL

## 2018-07-12 DIAGNOSIS — R53.1 WEAKNESS: ICD-10-CM

## 2018-07-12 DIAGNOSIS — M54.32 SCIATICA, LEFT SIDE: ICD-10-CM

## 2018-07-12 DIAGNOSIS — R53.81 PHYSICAL DECONDITIONING: Primary | ICD-10-CM

## 2018-07-12 PROCEDURE — 97110 THERAPEUTIC EXERCISES: CPT

## 2018-07-12 NOTE — PROGRESS NOTES
PHYSICAL THERAPY DAILY NOTE    Referring Provider:  Devon Robles    Diagnosis:         ICD-10-CM ICD-9-CM    1. Physical deconditioning R53.81 799.3    2. Sciatica, left side M54.32 724.3    3. Weakness R53.1 780.79        Orders:  Evaluate and Treat    Date of Initial Evaluation: 1/16/18    Visit # 13 of 20    BACKGROUND:  Patient returns to PT after 1 month off due to medical issues.  She c/o continued weakness in her L LE and decreased movement in her L shoulder.    Subjective 7/12/18 - Patient reports feeling pretty good today.    OBJECTIVE:  Updated 6/19    TREATMENT PROVIDED:  -Therapeutic Exercise:  40 min   - Sciatic n glides with assist - 20x   - Bridging - 40x   - DKTC/LTRs with ball - 30x   - Hip abd/add with resistance- 3 x 10  25#   - HS curls - 3 x 10  25#   - Bike - 5'   - UBE - 3/3'  Fw/bw   - DL TG squats - 4 x 10  4 bands   - Stairs (4 steps) - Up and down x 2 reps with SBA and bilateral rails using step to gait pattern with strong leg leading    ASSESSMENT:  Patient with increased fatigue following therex but she was able to complete them with rest breaks.    PLAN:  Patient will benefit from physical therapy (2-3) x/week for (4-6) weeks including therapeutic exercise, functional activities, neuromuscular re-education, gait training, modalities, and patient education.

## 2018-07-23 ENCOUNTER — LAB VISIT (OUTPATIENT)
Dept: LAB | Facility: HOSPITAL | Age: 53
End: 2018-07-23
Attending: INTERNAL MEDICINE
Payer: MEDICAID

## 2018-07-23 ENCOUNTER — CLINICAL SUPPORT (OUTPATIENT)
Dept: REHABILITATION | Facility: HOSPITAL | Age: 53
End: 2018-07-23
Payer: COMMERCIAL

## 2018-07-23 DIAGNOSIS — E83.42 HYPOMAGNESEMIA: ICD-10-CM

## 2018-07-23 DIAGNOSIS — M54.32 SCIATICA, LEFT SIDE: ICD-10-CM

## 2018-07-23 DIAGNOSIS — R53.1 WEAKNESS: ICD-10-CM

## 2018-07-23 DIAGNOSIS — Z94.4 LIVER REPLACED BY TRANSPLANT: ICD-10-CM

## 2018-07-23 DIAGNOSIS — R53.81 PHYSICAL DECONDITIONING: Primary | ICD-10-CM

## 2018-07-23 LAB
ALBUMIN SERPL BCP-MCNC: 3.8 G/DL
ALP SERPL-CCNC: 100 U/L
ALT SERPL W/O P-5'-P-CCNC: 22 U/L
ANION GAP SERPL CALC-SCNC: 6 MMOL/L
AST SERPL-CCNC: 17 U/L
BASOPHILS # BLD AUTO: 0.01 K/UL
BASOPHILS NFR BLD: 0.1 %
BILIRUB SERPL-MCNC: 0.4 MG/DL
BUN SERPL-MCNC: 18 MG/DL
CALCIUM SERPL-MCNC: 9.6 MG/DL
CHLORIDE SERPL-SCNC: 105 MMOL/L
CO2 SERPL-SCNC: 32 MMOL/L
CREAT SERPL-MCNC: 0.9 MG/DL
DIFFERENTIAL METHOD: ABNORMAL
EOSINOPHIL # BLD AUTO: 0.1 K/UL
EOSINOPHIL NFR BLD: 1.2 %
ERYTHROCYTE [DISTWIDTH] IN BLOOD BY AUTOMATED COUNT: 14.9 %
EST. GFR  (AFRICAN AMERICAN): >60 ML/MIN/1.73 M^2
EST. GFR  (NON AFRICAN AMERICAN): >60 ML/MIN/1.73 M^2
GLUCOSE SERPL-MCNC: 82 MG/DL
HCT VFR BLD AUTO: 36.6 %
HGB BLD-MCNC: 11.6 G/DL
LYMPHOCYTES # BLD AUTO: 1.5 K/UL
LYMPHOCYTES NFR BLD: 22.4 %
MAGNESIUM SERPL-MCNC: 1.9 MG/DL
MCH RBC QN AUTO: 24.7 PG
MCHC RBC AUTO-ENTMCNC: 31.7 G/DL
MCV RBC AUTO: 78 FL
MONOCYTES # BLD AUTO: 0.5 K/UL
MONOCYTES NFR BLD: 7.2 %
NEUTROPHILS # BLD AUTO: 4.6 K/UL
NEUTROPHILS NFR BLD: 69.1 %
PLATELET # BLD AUTO: 199 K/UL
PMV BLD AUTO: 10.5 FL
POTASSIUM SERPL-SCNC: 3.9 MMOL/L
PROT SERPL-MCNC: 6.7 G/DL
RBC # BLD AUTO: 4.69 M/UL
SODIUM SERPL-SCNC: 143 MMOL/L
WBC # BLD AUTO: 6.71 K/UL

## 2018-07-23 PROCEDURE — 97110 THERAPEUTIC EXERCISES: CPT

## 2018-07-23 PROCEDURE — 83735 ASSAY OF MAGNESIUM: CPT | Mod: PO

## 2018-07-23 PROCEDURE — 85025 COMPLETE CBC W/AUTO DIFF WBC: CPT | Mod: PO

## 2018-07-23 PROCEDURE — 80197 ASSAY OF TACROLIMUS: CPT

## 2018-07-23 PROCEDURE — 36415 COLL VENOUS BLD VENIPUNCTURE: CPT | Mod: PO

## 2018-07-23 PROCEDURE — 97164 PT RE-EVAL EST PLAN CARE: CPT | Mod: 59

## 2018-07-23 PROCEDURE — 80053 COMPREHEN METABOLIC PANEL: CPT | Mod: PO

## 2018-07-23 NOTE — PLAN OF CARE
PHYSICAL THERAPY RE-EVALUATION    Referring Provider:  Devon Robles    Diagnosis:         ICD-10-CM ICD-9-CM    1. Physical deconditioning R53.81 799.3    2. Sciatica, left side M54.32 724.3    3. Weakness R53.1 780.79        Orders:  Evaluate and Treat    Date of Initial Evaluation: 1/16/18    Visit # 14 of 20    BACKGROUND:  Patient returns to PT after 1 month off due to medical issues.  She c/o continued weakness in her L LE and decreased movement in her L shoulder.    Subjective 7/23/18 - Patient reports feeling pretty good today.    OBJECTIVE:  Updated 7/23    Gait: Pt ambulates with mild gait deviations at this time.     ROM:  WNL bilateral LEs,  L shoulder ROM limited by 20% in flex and abduction        Strength:    Strength Right Left   Hip Flexion 4/5 3/5   Knee Extension 4/5 3+/5   Ankle DF 4/5 3/5   Knee Flexors 4/5 3+/5      Function:  Patient reports 42% impairment on the Lower Extremity Functional Scale (LEFS).     Short Term GOALS: 3 weeks.   1. Pt will walk 150 feet with or without AD independently without need for rest break in order to demonstrate increased independence and endurance for functional mobility around the home  MET  2. Pt will increase strength to 3+/5 or greater in bilateral LEs.  PARTIALLY MET IN L LE  3. Pt will perform 45 min of therex without need for rest break in order to show increased endurance to standing position required for household activities.  PARTIALLY MET  4. Pt will be I with HEP.  PARTIALLY MET     Long Term GOALS: 8-10  weeks. .  1. Pt will ambulate 300 feet independently without rest break in order to show increased independence and endurance to functional mobility in home and community  MET  2. Pt will increase strength to >/= 4/5 MMT grade in BLE in order to perform ADLs without difficulty. MET IN R LE  3. Pt will perform 20 min of standing therex without need for rest break in order to show increased endurance to standing position required for cooking at  home  PARTIALLY MET  3. Pt's goal:  Independent with all ADLs and mobility, be able to cook for family  PARTIALLY MET      TREATMENT PROVIDED:  -Re-evaluation - 10 min  -Therapeutic Exercise:  40 min   - Sciatic n glides - 20x   - Knee extensions - 3 x 10  10#   - Hip abd/add with resistance- 3 x 10  30#   - HS curls - 3 x 10  30#   - Bike - 6'   - UBE - 3/3'  Fw/bw   - SL TG squats - 3 x 10  4 bands   - Stairs (4 steps) - Up and down x 2 reps with SBA and bilateral rails using step to gait pattern with strong leg leading    ASSESSMENT:  Patient demonstrating improved L LE strength and improved mobility.  She continues to have weakness in bilateral LEs with the left more affected than the right, but her L LE has shown improvement in the last month.  She continues to have difficulty with prolonged standing activities and stair mgmt.  She would benefit from continued PT to improve her strength and mobility.    PLAN:  Patient will benefit from physical therapy (2-3) x/week for (4-6) weeks including therapeutic exercise, functional activities, neuromuscular re-education, gait training, modalities, and patient education.

## 2018-07-24 DIAGNOSIS — Z94.4 LIVER REPLACED BY TRANSPLANT: ICD-10-CM

## 2018-07-24 LAB — TACROLIMUS BLD-MCNC: 7.5 NG/ML

## 2018-07-25 RX ORDER — PREDNISONE 10 MG/1
TABLET ORAL
Qty: 30 TABLET | Refills: 0 | Status: SHIPPED | OUTPATIENT
Start: 2018-07-25 | End: 2018-08-17

## 2018-07-26 ENCOUNTER — TELEPHONE (OUTPATIENT)
Dept: TRANSPLANT | Facility: CLINIC | Age: 53
End: 2018-07-26

## 2018-07-26 NOTE — TELEPHONE ENCOUNTER
----- Message from Shayy Tejada MD sent at 7/24/2018  1:49 PM CDT -----  Liver tests normal, prograf adequate.  No change in dosing and repeat labs per protocol

## 2018-07-26 NOTE — TELEPHONE ENCOUNTER
Dr. Tejada reviewed your labs  Continue routine labs no changes needed.  Sent patient message to let her know to repeat her labs on 08/06/18.

## 2018-08-06 ENCOUNTER — LAB VISIT (OUTPATIENT)
Dept: LAB | Facility: HOSPITAL | Age: 53
End: 2018-08-06
Attending: INTERNAL MEDICINE
Payer: COMMERCIAL

## 2018-08-06 ENCOUNTER — CLINICAL SUPPORT (OUTPATIENT)
Dept: REHABILITATION | Facility: HOSPITAL | Age: 53
End: 2018-08-06
Payer: COMMERCIAL

## 2018-08-06 DIAGNOSIS — E83.42 HYPOMAGNESEMIA: ICD-10-CM

## 2018-08-06 DIAGNOSIS — Z94.4 LIVER REPLACED BY TRANSPLANT: ICD-10-CM

## 2018-08-06 DIAGNOSIS — R53.81 PHYSICAL DECONDITIONING: Primary | ICD-10-CM

## 2018-08-06 DIAGNOSIS — Z94.4 LIVER REPLACED BY TRANSPLANT: Primary | ICD-10-CM

## 2018-08-06 DIAGNOSIS — M54.32 SCIATICA, LEFT SIDE: ICD-10-CM

## 2018-08-06 DIAGNOSIS — E87.5 HYPERKALEMIA: ICD-10-CM

## 2018-08-06 DIAGNOSIS — R53.1 WEAKNESS: ICD-10-CM

## 2018-08-06 LAB
ALBUMIN SERPL BCP-MCNC: 3.6 G/DL
ALP SERPL-CCNC: 104 U/L
ALT SERPL W/O P-5'-P-CCNC: 25 U/L
ANION GAP SERPL CALC-SCNC: 9 MMOL/L
AST SERPL-CCNC: 17 U/L
BASOPHILS # BLD AUTO: 0.03 K/UL
BASOPHILS NFR BLD: 0.4 %
BILIRUB SERPL-MCNC: 0.4 MG/DL
BUN SERPL-MCNC: 23 MG/DL
CALCIUM SERPL-MCNC: 9.3 MG/DL
CHLORIDE SERPL-SCNC: 106 MMOL/L
CO2 SERPL-SCNC: 26 MMOL/L
CREAT SERPL-MCNC: 0.9 MG/DL
DIFFERENTIAL METHOD: ABNORMAL
EOSINOPHIL # BLD AUTO: 0.1 K/UL
EOSINOPHIL NFR BLD: 0.8 %
ERYTHROCYTE [DISTWIDTH] IN BLOOD BY AUTOMATED COUNT: 15.3 %
EST. GFR  (AFRICAN AMERICAN): >60 ML/MIN/1.73 M^2
EST. GFR  (NON AFRICAN AMERICAN): >60 ML/MIN/1.73 M^2
GLUCOSE SERPL-MCNC: 78 MG/DL
HCT VFR BLD AUTO: 38.2 %
HGB BLD-MCNC: 11.5 G/DL
IMM GRANULOCYTES # BLD AUTO: 0.04 K/UL
IMM GRANULOCYTES NFR BLD AUTO: 0.5 %
LYMPHOCYTES # BLD AUTO: 1.9 K/UL
LYMPHOCYTES NFR BLD: 22.8 %
MAGNESIUM SERPL-MCNC: 1.6 MG/DL
MCH RBC QN AUTO: 24.5 PG
MCHC RBC AUTO-ENTMCNC: 30.1 G/DL
MCV RBC AUTO: 81 FL
MONOCYTES # BLD AUTO: 0.6 K/UL
MONOCYTES NFR BLD: 7.4 %
NEUTROPHILS # BLD AUTO: 5.7 K/UL
NEUTROPHILS NFR BLD: 68.1 %
NRBC BLD-RTO: 0 /100 WBC
PLATELET # BLD AUTO: 67 K/UL
PMV BLD AUTO: 11 FL
POTASSIUM SERPL-SCNC: 4.2 MMOL/L
POTASSIUM SERPL-SCNC: 4.2 MMOL/L
PROT SERPL-MCNC: 6.8 G/DL
RBC # BLD AUTO: 4.7 M/UL
SODIUM SERPL-SCNC: 141 MMOL/L
WBC # BLD AUTO: 8.36 K/UL

## 2018-08-06 PROCEDURE — 80053 COMPREHEN METABOLIC PANEL: CPT

## 2018-08-06 PROCEDURE — 85025 COMPLETE CBC W/AUTO DIFF WBC: CPT

## 2018-08-06 PROCEDURE — 83735 ASSAY OF MAGNESIUM: CPT

## 2018-08-06 PROCEDURE — 36415 COLL VENOUS BLD VENIPUNCTURE: CPT | Mod: PO

## 2018-08-06 PROCEDURE — 80197 ASSAY OF TACROLIMUS: CPT

## 2018-08-06 PROCEDURE — 97110 THERAPEUTIC EXERCISES: CPT

## 2018-08-06 NOTE — PROGRESS NOTES
PHYSICAL THERAPY DAILY NOTE    Referring Provider:  Devon Robles    Diagnosis:         ICD-10-CM ICD-9-CM    1. Physical deconditioning R53.81 799.3    2. Sciatica, left side M54.32 724.3    3. Weakness R53.1 780.79        Orders:  Evaluate and Treat    Date of Initial Evaluation: 1/16/18    Visit # 14 of 20    BACKGROUND:  Patient returns to PT after 1 month off due to medical issues.  She c/o continued weakness in her L LE and decreased movement in her L shoulder.    Subjective 8/6/18 - Patient reports that she just got back from vacation with her family in [place].    OBJECTIVE:  Updated 7/23    TREATMENT PROVIDED:  -Therapeutic Exercise:  40 min   - Sciatic n glides - 20x   - Knee extensions - 3 x 10  10#   - Hip abd/add with resistance- 3 x 10  30#   - HS curls - 3 x 10  30#   - Bike - 6'   - UBE - 3/3'  Fw/bw   - SL TG squats - 3 x 10  4 bands   - Stairs (4 steps) - held    ASSESSMENT:  Patient able to go on vacation with her family and go up and down multiple flights of steps with the help of her family members.  She displays improved overall strength and mobility but still with weakness in her L UE and LE/    PLAN:  Patient will benefit from physical therapy (2-3) x/week for (4-6) weeks including therapeutic exercise, functional activities, neuromuscular re-education, gait training, modalities, and patient education.

## 2018-08-07 LAB — TACROLIMUS BLD-MCNC: 8.3 NG/ML

## 2018-08-07 RX ORDER — POTASSIUM CHLORIDE 750 MG/1
20 CAPSULE, EXTENDED RELEASE ORAL DAILY
Qty: 60 CAPSULE | Refills: 6 | Status: SHIPPED | OUTPATIENT
Start: 2018-08-07 | End: 2018-08-17 | Stop reason: ALTCHOICE

## 2018-08-08 ENCOUNTER — OFFICE VISIT (OUTPATIENT)
Dept: OPHTHALMOLOGY | Facility: CLINIC | Age: 53
End: 2018-08-08
Payer: MEDICAID

## 2018-08-08 DIAGNOSIS — Z46.0 CONTACT LENS/GLASSES FITTING: Primary | ICD-10-CM

## 2018-08-08 PROCEDURE — 99499 UNLISTED E&M SERVICE: CPT | Mod: S$PBB,,, | Performed by: OPTOMETRIST

## 2018-08-08 PROCEDURE — 99212 OFFICE O/P EST SF 10 MIN: CPT | Mod: PBBFAC,PO | Performed by: OPTOMETRIST

## 2018-08-08 PROCEDURE — 99999 PR PBB SHADOW E&M-EST. PATIENT-LVL II: CPT | Mod: PBBFAC,,, | Performed by: OPTOMETRIST

## 2018-08-10 ENCOUNTER — TELEPHONE (OUTPATIENT)
Dept: TRANSPLANT | Facility: CLINIC | Age: 53
End: 2018-08-10

## 2018-08-10 NOTE — TELEPHONE ENCOUNTER
----- Message from Joselin Souza MD sent at 8/9/2018  3:18 PM CDT -----  Reviewed, nothing to do

## 2018-08-10 NOTE — TELEPHONE ENCOUNTER
Dr. Souza reviewed your labs.  Continue routine labs no changes needed.  Letter sent for next lab appointment 08/27/18

## 2018-08-14 DIAGNOSIS — Z94.4 LIVER REPLACED BY TRANSPLANT: Primary | ICD-10-CM

## 2018-08-17 ENCOUNTER — OFFICE VISIT (OUTPATIENT)
Dept: GASTROENTEROLOGY | Facility: CLINIC | Age: 53
End: 2018-08-17
Payer: MEDICAID

## 2018-08-17 VITALS
HEART RATE: 100 BPM | BODY MASS INDEX: 33.26 KG/M2 | DIASTOLIC BLOOD PRESSURE: 80 MMHG | WEIGHT: 180.75 LBS | SYSTOLIC BLOOD PRESSURE: 142 MMHG | HEIGHT: 62 IN

## 2018-08-17 DIAGNOSIS — M19.90 ARTHRITIS: ICD-10-CM

## 2018-08-17 DIAGNOSIS — Z94.4 LIVER REPLACED BY TRANSPLANT: ICD-10-CM

## 2018-08-17 DIAGNOSIS — E66.09 CLASS 1 OBESITY DUE TO EXCESS CALORIES WITH BODY MASS INDEX (BMI) OF 33.0 TO 33.9 IN ADULT, UNSPECIFIED WHETHER SERIOUS COMORBIDITY PRESENT: ICD-10-CM

## 2018-08-17 DIAGNOSIS — G62.9 NEUROPATHY: ICD-10-CM

## 2018-08-17 DIAGNOSIS — D84.9 IMMUNOSUPPRESSION: Primary | ICD-10-CM

## 2018-08-17 PROCEDURE — 99213 OFFICE O/P EST LOW 20 MIN: CPT | Mod: PBBFAC,PO | Performed by: INTERNAL MEDICINE

## 2018-08-17 PROCEDURE — 99999 PR PBB SHADOW E&M-EST. PATIENT-LVL III: CPT | Mod: PBBFAC,,, | Performed by: INTERNAL MEDICINE

## 2018-08-17 PROCEDURE — 99213 OFFICE O/P EST LOW 20 MIN: CPT | Mod: S$PBB,,, | Performed by: INTERNAL MEDICINE

## 2018-08-17 RX ORDER — PREDNISONE 5 MG/1
5 TABLET ORAL DAILY
Qty: 30 TABLET | Refills: 0 | Status: SHIPPED | OUTPATIENT
Start: 2018-08-17 | End: 2018-09-13

## 2018-08-17 NOTE — PROGRESS NOTES
Transplant Hepatology  Liver Transplant Recipient Follow-up    Transplant Date: 2017  [facility] Native Liver Dx: Alcoholic Cirrhosis    Marcie is here for follow up of her liver transplant.    ORGAN: LIVER  Whole or Partial: whole liver  Donor Type:  - brain death  CDC High Risk: no  Donor CMV Status: Negative  Donor HCV Status: Negative  Donor HBcAb: Negative  Biliary Anastomosis: end to end  Arterial Anatomy: standard  IVC reconstruction: cavaplasty piggyback  Portal vein status: patent  .    Subjective:     Interval History:  Currently, she is doing adequately. Current complaints include same musculoskeletal issues.  She continues to have physical therapy but will have issues with arthritis, neuropathy as well as difficulties with using her left leg and therefore putting strain on her right leg.  She was referred to Internal Medicine at last visit but has not been able to get an appointment yet.  Otherwise she has been doing okay and her liver tests have been doing well.    Review of Systems    Objective:     Physical Exam   Constitutional: She is oriented to person, place, and time. She appears well-developed and well-nourished. No distress.   HENT:   Head: Normocephalic and atraumatic.   Mouth/Throat: Oropharynx is clear and moist. No oropharyngeal exudate.   Eyes: Conjunctivae are normal. Pupils are equal, round, and reactive to light. Right eye exhibits no discharge. Left eye exhibits no discharge. No scleral icterus.   Pulmonary/Chest: Effort normal and breath sounds normal. No respiratory distress. She has no wheezes.   Abdominal: Soft. She exhibits no distension. There is no tenderness.   Musculoskeletal: She exhibits no edema.   Neurological: She is alert and oriented to person, place, and time.   Psychiatric: She has a normal mood and affect. Her behavior is normal.   Vitals reviewed.      WBC   Date Value Ref Range Status   2018 8.36 3.90 - 12.70 K/uL Final     Hemoglobin   Date Value Ref  Range Status   08/06/2018 11.5 (L) 12.0 - 16.0 g/dL Final     POC Hematocrit   Date Value Ref Range Status   12/26/2017 28 (L) 36 - 54 %PCV Final     Hematocrit   Date Value Ref Range Status   08/06/2018 38.2 37.0 - 48.5 % Final     Platelets   Date Value Ref Range Status   08/06/2018 67 (L) 150 - 350 K/uL Final     BUN, Bld   Date Value Ref Range Status   08/06/2018 23 (H) 6 - 20 mg/dL Final     Creatinine   Date Value Ref Range Status   08/06/2018 0.9 0.5 - 1.4 mg/dL Final     Glucose   Date Value Ref Range Status   08/06/2018 78 70 - 110 mg/dL Final     Calcium   Date Value Ref Range Status   08/06/2018 9.3 8.7 - 10.5 mg/dL Final     Sodium   Date Value Ref Range Status   08/06/2018 141 136 - 145 mmol/L Final     Potassium   Date Value Ref Range Status   08/06/2018 4.2 3.5 - 5.1 mmol/L Final   08/06/2018 4.2 3.5 - 5.1 mmol/L Final     Chloride   Date Value Ref Range Status   08/06/2018 106 95 - 110 mmol/L Final     Magnesium   Date Value Ref Range Status   08/06/2018 1.6 1.6 - 2.6 mg/dL Final     AST   Date Value Ref Range Status   08/06/2018 17 10 - 40 U/L Final     ALT   Date Value Ref Range Status   08/06/2018 25 10 - 44 U/L Final     Alkaline Phosphatase   Date Value Ref Range Status   08/06/2018 104 55 - 135 U/L Final     Total Bilirubin   Date Value Ref Range Status   08/06/2018 0.4 0.1 - 1.0 mg/dL Final     Comment:     For infants and newborns, interpretation of results should be based  on gestational age, weight and in agreement with clinical  observations.  Premature Infant recommended reference ranges:  Up to 24 hours.............<8.0 mg/dL  Up to 48 hours............<12.0 mg/dL  3-5 days..................<15.0 mg/dL  6-29 days.................<15.0 mg/dL       Albumin   Date Value Ref Range Status   08/06/2018 3.6 3.5 - 5.2 g/dL Final     INR   Date Value Ref Range Status   03/09/2018 1.0 0.8 - 1.2 Final     Comment:     Coumadin Therapy:  2.0 - 3.0 for INR for all indicators except mechanical heart  valves  and antiphospholipid syndromes which should use 2.5 - 3.5.       Lab Results   Component Value Date    TACROLIMUS 8.3 08/06/2018           Assessment/Plan:     1. Immunosuppression    2. Liver replaced by transplant    3. Class 1 obesity due to excess calories with body mass index (BMI) of 33.0 to 33.9 in adult, unspecified whether serious comorbidity present    4. Arthritis    5. Neuropathy      Immunosuppression  -taper off prednisone-Prednisone 5mg a day for 7 days then 2.5mg a day for 7 days then stop  -Steroids taper should be off in 2 weeks then will try to spread out labs  -     predniSONE (DELTASONE) 5 MG tablet; Take 1 tablet (5 mg total) by mouth once daily.  Dispense: 30 tablet; Refill: 0    Liver replaced by transplant-good allograft fuction  -     predniSONE (DELTASONE) 5 MG tablet; Take 1 tablet (5 mg total) by mouth once daily.  Dispense: 30 tablet; Refill: 0    Class 1 obesity due to excess calories with body mass index (BMI) of 33.0 to 33.9 in adult, unspecified whether serious comorbidity present  -Discussed weight management    Arthritis/Neuropathy  -Still needs PCP f/u will try to assist with appt, if unable to get here then patient may need to see her previous PCP    RTC in 3 months    Patient was seen in the liver transplant department at [facility].      MD JUAN Marie Patient Status  Functional Status: 60% - Requires occasional assistance but is able to care for needs  Physical Capacity: Limited Mobility

## 2018-08-20 ENCOUNTER — CLINICAL SUPPORT (OUTPATIENT)
Dept: REHABILITATION | Facility: HOSPITAL | Age: 53
End: 2018-08-20
Payer: MEDICAID

## 2018-08-20 DIAGNOSIS — R53.1 WEAKNESS: ICD-10-CM

## 2018-08-20 DIAGNOSIS — R53.81 PHYSICAL DECONDITIONING: Primary | ICD-10-CM

## 2018-08-20 DIAGNOSIS — M54.32 SCIATICA, LEFT SIDE: ICD-10-CM

## 2018-08-20 PROCEDURE — 97110 THERAPEUTIC EXERCISES: CPT

## 2018-08-20 NOTE — PROGRESS NOTES
PHYSICAL THERAPY DAILY NOTE    Referring Provider:  Devon Robles    Diagnosis:         ICD-10-CM ICD-9-CM    1. Physical deconditioning R53.81 799.3    2. Sciatica, left side M54.32 724.3    3. Weakness R53.1 780.79        Orders:  Evaluate and Treat    Date of Initial Evaluation: 1/16/18    Visit # 15 of 20    BACKGROUND:  Patient returns to PT after 1 month off due to medical issues.  She c/o continued weakness in her L LE and decreased movement in her L shoulder.    Subjective 8/20/18 - Patient reports that she's been feeling pretty good.    OBJECTIVE:  Updated 7/23    TREATMENT PROVIDED:  -Therapeutic Exercise:  40 min   - Sciatic n glides - 20x   - Knee extensions - 3 x 10  10#   - Hip abd/add with resistance- 3 x 10  30#   - HS curls - 3 x 10  30#   - Bike - 6'   - UBE - 3/3'  Fw/bw   - SL TG squats - 3 x 10  4 bands   - Stairs (4 steps) - held    ASSESSMENT:  Patient experienced moderate fatigue with activities but able to complete them with rest breaks.    PLAN:  Patient will benefit from physical therapy (2-3) x/week for (4-6) weeks including therapeutic exercise, functional activities, neuromuscular re-education, gait training, modalities, and patient education.

## 2018-08-28 ENCOUNTER — LAB VISIT (OUTPATIENT)
Dept: LAB | Facility: HOSPITAL | Age: 53
End: 2018-08-28
Attending: INTERNAL MEDICINE
Payer: MEDICAID

## 2018-08-28 DIAGNOSIS — Z94.4 LIVER REPLACED BY TRANSPLANT: ICD-10-CM

## 2018-08-28 LAB
ALBUMIN SERPL BCP-MCNC: 3.4 G/DL
ALP SERPL-CCNC: 122 U/L
ALT SERPL W/O P-5'-P-CCNC: 30 U/L
ANION GAP SERPL CALC-SCNC: 10 MMOL/L
AST SERPL-CCNC: 20 U/L
BASOPHILS # BLD AUTO: 0.02 K/UL
BASOPHILS NFR BLD: 0.3 %
BILIRUB SERPL-MCNC: 0.5 MG/DL
BUN SERPL-MCNC: 16 MG/DL
CALCIUM SERPL-MCNC: 9.4 MG/DL
CHLORIDE SERPL-SCNC: 107 MMOL/L
CO2 SERPL-SCNC: 26 MMOL/L
CREAT SERPL-MCNC: 0.9 MG/DL
DIFFERENTIAL METHOD: ABNORMAL
EOSINOPHIL # BLD AUTO: 0.1 K/UL
EOSINOPHIL NFR BLD: 1.1 %
ERYTHROCYTE [DISTWIDTH] IN BLOOD BY AUTOMATED COUNT: 15.8 %
EST. GFR  (AFRICAN AMERICAN): >60 ML/MIN/1.73 M^2
EST. GFR  (NON AFRICAN AMERICAN): >60 ML/MIN/1.73 M^2
GLUCOSE SERPL-MCNC: 87 MG/DL
HCT VFR BLD AUTO: 35 %
HGB BLD-MCNC: 11 G/DL
IMM GRANULOCYTES # BLD AUTO: 0.01 K/UL
IMM GRANULOCYTES NFR BLD AUTO: 0.1 %
LYMPHOCYTES # BLD AUTO: 1.3 K/UL
LYMPHOCYTES NFR BLD: 17.5 %
MAGNESIUM SERPL-MCNC: 1.4 MG/DL
MCH RBC QN AUTO: 24.7 PG
MCHC RBC AUTO-ENTMCNC: 31.4 G/DL
MCV RBC AUTO: 79 FL
MONOCYTES # BLD AUTO: 0.6 K/UL
MONOCYTES NFR BLD: 7.6 %
NEUTROPHILS # BLD AUTO: 5.6 K/UL
NEUTROPHILS NFR BLD: 73.4 %
NRBC BLD-RTO: 0 /100 WBC
PLATELET # BLD AUTO: 181 K/UL
PMV BLD AUTO: 11.6 FL
POTASSIUM SERPL-SCNC: 4 MMOL/L
PROT SERPL-MCNC: 6.6 G/DL
RBC # BLD AUTO: 4.45 M/UL
SODIUM SERPL-SCNC: 143 MMOL/L
WBC # BLD AUTO: 7.61 K/UL

## 2018-08-28 PROCEDURE — 80053 COMPREHEN METABOLIC PANEL: CPT

## 2018-08-28 PROCEDURE — 36415 COLL VENOUS BLD VENIPUNCTURE: CPT | Mod: PO

## 2018-08-28 PROCEDURE — 83735 ASSAY OF MAGNESIUM: CPT

## 2018-08-28 PROCEDURE — 85025 COMPLETE CBC W/AUTO DIFF WBC: CPT

## 2018-08-28 PROCEDURE — 80197 ASSAY OF TACROLIMUS: CPT

## 2018-08-29 DIAGNOSIS — Z94.4 LIVER REPLACED BY TRANSPLANT: ICD-10-CM

## 2018-08-29 LAB — TACROLIMUS BLD-MCNC: 12 NG/ML

## 2018-08-29 NOTE — TELEPHONE ENCOUNTER
Dr. Souza reviewed your labs.  Called patient to let her know to decrease Prograf dose to 4 mg twice a day and repeat labs on Tuesday.  She is taking her Magnesium.

## 2018-08-29 NOTE — TELEPHONE ENCOUNTER
----- Message from Joselin Souza MD sent at 8/29/2018  4:53 PM CDT -----  Tacrolimus level is elevated for unclear reasons if this is a true trough decrease dose to 4 mg twice a day, also make sure patient is taking magnesium supplementation.  Repeat labs next week.

## 2018-08-30 RX ORDER — TACROLIMUS 1 MG/1
4 CAPSULE ORAL EVERY 12 HOURS
Qty: 240 CAPSULE | Refills: 11 | Status: SHIPPED | OUTPATIENT
Start: 2018-08-30 | End: 2018-09-06

## 2018-08-30 NOTE — PROGRESS NOTES
HPI     Last [facility] exam     Last edited by Sandip Alex MA on 8/8/2018  9:53 AM. (History)            Assessment /Plan     For exam results, see Encounter Report.    Contact lens/glasses fitting      Patient decided against CL fit after consultation.  No charge.

## 2018-09-04 ENCOUNTER — LAB VISIT (OUTPATIENT)
Dept: LAB | Facility: HOSPITAL | Age: 53
End: 2018-09-04
Attending: INTERNAL MEDICINE
Payer: MEDICAID

## 2018-09-04 DIAGNOSIS — Z29.89 PROPHYLACTIC IMMUNOTHERAPY: ICD-10-CM

## 2018-09-04 DIAGNOSIS — Z94.4 LIVER REPLACED BY TRANSPLANT: ICD-10-CM

## 2018-09-04 DIAGNOSIS — R60.0 BILATERAL LEG EDEMA: ICD-10-CM

## 2018-09-04 LAB
ALBUMIN SERPL BCP-MCNC: 3.7 G/DL
ALP SERPL-CCNC: 138 U/L
ALT SERPL W/O P-5'-P-CCNC: 22 U/L
ANION GAP SERPL CALC-SCNC: 12 MMOL/L
AST SERPL-CCNC: 21 U/L
BASOPHILS # BLD AUTO: 0.02 K/UL
BASOPHILS NFR BLD: 0.3 %
BILIRUB SERPL-MCNC: 0.8 MG/DL
BUN SERPL-MCNC: 22 MG/DL
CALCIUM SERPL-MCNC: 9.9 MG/DL
CHLORIDE SERPL-SCNC: 108 MMOL/L
CO2 SERPL-SCNC: 25 MMOL/L
CREAT SERPL-MCNC: 1 MG/DL
DIFFERENTIAL METHOD: ABNORMAL
EOSINOPHIL # BLD AUTO: 0.1 K/UL
EOSINOPHIL NFR BLD: 0.7 %
ERYTHROCYTE [DISTWIDTH] IN BLOOD BY AUTOMATED COUNT: 16.6 %
EST. GFR  (AFRICAN AMERICAN): >60 ML/MIN/1.73 M^2
EST. GFR  (NON AFRICAN AMERICAN): >60 ML/MIN/1.73 M^2
GLUCOSE SERPL-MCNC: 81 MG/DL
HCT VFR BLD AUTO: 38 %
HGB BLD-MCNC: 11.7 G/DL
IMM GRANULOCYTES # BLD AUTO: 0.02 K/UL
IMM GRANULOCYTES NFR BLD AUTO: 0.3 %
LYMPHOCYTES # BLD AUTO: 1.3 K/UL
LYMPHOCYTES NFR BLD: 18 %
MAGNESIUM SERPL-MCNC: 1.3 MG/DL
MCH RBC QN AUTO: 24.7 PG
MCHC RBC AUTO-ENTMCNC: 30.8 G/DL
MCV RBC AUTO: 80 FL
MONOCYTES # BLD AUTO: 0.5 K/UL
MONOCYTES NFR BLD: 7.2 %
NEUTROPHILS # BLD AUTO: 5.4 K/UL
NEUTROPHILS NFR BLD: 73.5 %
NRBC BLD-RTO: 0 /100 WBC
PLATELET # BLD AUTO: 67 K/UL
PMV BLD AUTO: 11.8 FL
POTASSIUM SERPL-SCNC: 4.2 MMOL/L
PROT SERPL-MCNC: 7.1 G/DL
RBC # BLD AUTO: 4.73 M/UL
SODIUM SERPL-SCNC: 145 MMOL/L
WBC # BLD AUTO: 7.27 K/UL

## 2018-09-04 PROCEDURE — 85025 COMPLETE CBC W/AUTO DIFF WBC: CPT

## 2018-09-04 PROCEDURE — 36415 COLL VENOUS BLD VENIPUNCTURE: CPT | Mod: PO

## 2018-09-04 PROCEDURE — 80197 ASSAY OF TACROLIMUS: CPT

## 2018-09-04 PROCEDURE — 83735 ASSAY OF MAGNESIUM: CPT

## 2018-09-04 PROCEDURE — 80053 COMPREHEN METABOLIC PANEL: CPT

## 2018-09-05 LAB — TACROLIMUS BLD-MCNC: 14.5 NG/ML

## 2018-09-05 RX ORDER — FUROSEMIDE 40 MG/1
TABLET ORAL
Qty: 30 TABLET | Refills: 0 | Status: SHIPPED | OUTPATIENT
Start: 2018-09-05 | End: 2018-10-09 | Stop reason: SDUPTHER

## 2018-09-06 ENCOUNTER — TELEPHONE (OUTPATIENT)
Dept: TRANSPLANT | Facility: CLINIC | Age: 53
End: 2018-09-06

## 2018-09-06 DIAGNOSIS — Z94.4 LIVER REPLACED BY TRANSPLANT: ICD-10-CM

## 2018-09-06 RX ORDER — TACROLIMUS 1 MG/1
3 CAPSULE ORAL EVERY 12 HOURS
Qty: 180 CAPSULE | Refills: 11 | Status: SHIPPED | OUTPATIENT
Start: 2018-09-06 | End: 2018-09-14

## 2018-09-06 NOTE — TELEPHONE ENCOUNTER
Dr. Souza reviewed your labs.  Called patient to let her know to decrease Progaf to 3 mg bid and repeat labs on Tuesday.  She did not take her medication before her labs and is taking them at 8 am and 8 pm.

## 2018-09-06 NOTE — TELEPHONE ENCOUNTER
----- Message from Joselin Souza MD sent at 9/6/2018  8:52 AM CDT -----  Tacro level still high need to see if this is a true trough; if a true trough need to decrease tacro to 3mg twice a day and repeat labs next week

## 2018-09-06 NOTE — TELEPHONE ENCOUNTER
Left voicemail for patient to call back about Prograf, we decreased it last week but it is getting higher.

## 2018-09-06 NOTE — TELEPHONE ENCOUNTER
----- Message from Belgica Marquez sent at 9/6/2018 11:57 AM CDT -----  Contact: patient  Patient Returning Call from [facility]    Who Left Message for Patient: Cassidy  Communication Preference: [phone]  Additional Information: n/a

## 2018-09-10 ENCOUNTER — CLINICAL SUPPORT (OUTPATIENT)
Dept: REHABILITATION | Facility: HOSPITAL | Age: 53
End: 2018-09-10
Payer: COMMERCIAL

## 2018-09-10 DIAGNOSIS — M54.32 SCIATICA, LEFT SIDE: ICD-10-CM

## 2018-09-10 DIAGNOSIS — R53.1 WEAKNESS: ICD-10-CM

## 2018-09-10 DIAGNOSIS — R53.81 PHYSICAL DECONDITIONING: Primary | ICD-10-CM

## 2018-09-10 PROCEDURE — 97164 PT RE-EVAL EST PLAN CARE: CPT

## 2018-09-10 PROCEDURE — 97110 THERAPEUTIC EXERCISES: CPT

## 2018-09-10 NOTE — PROGRESS NOTES
PHYSICAL THERAPY RE-EVALUATION    Referring Provider:  Devon Robles    Diagnosis:         ICD-10-CM ICD-9-CM    1. Physical deconditioning R53.81 799.3    2. Sciatica, left side M54.32 724.3    3. Weakness R53.1 780.79        Orders:  Evaluate and Treat    Date of Initial Evaluation: 1/16/18    Visit # 16 of 20    BACKGROUND:  Patient returns to PT after 1 month off due to medical issues.  She c/o continued weakness in her L LE and decreased movement in her L shoulder.    Subjective 9/10/18 - Patient reports that she's been ill the last 3 week and that's why she hasn't been able to come to therapy. She states that she feels like her leg has been weaker since she's gotten sick.    OBJECTIVE:  Updated 9/10    Gait: Pt ambulates with mild gait deviations at this time.     ROM:  WNL bilateral LEs,  L shoulder ROM limited by 20% in flex and abduction        Strength:    Strength Right Left   Hip Flexion 4/5 3/5   Knee Extension 4/5 3+/5   Ankle DF 4/5 3/5   Knee Flexors 4/5 3+/5      Function:  Patient reports 42% impairment on the Lower Extremity Functional Scale (LEFS).     Short Term GOALS: 3 weeks.   1. Pt will walk 150 feet with or without AD independently without need for rest break in order to demonstrate increased independence and endurance for functional mobility around the home  MET  2. Pt will increase strength to 3+/5 or greater in bilateral LEs.  PARTIALLY MET IN L LE  3. Pt will perform 45 min of therex without need for rest break in order to show increased endurance to standing position required for household activities.  PARTIALLY MET  4. Pt will be I with HEP.  PARTIALLY MET     Long Term GOALS: 8-10  weeks. .  1. Pt will ambulate 300 feet independently without rest break in order to show increased independence and endurance to functional mobility in home and community  MET  2. Pt will increase strength to >/= 4/5 MMT grade in BLE in order to perform ADLs without difficulty. MET IN R LE  3. Pt will  perform 20 min of standing therex without need for rest break in order to show increased endurance to standing position required for cooking at home  PARTIALLY MET  3. Pt's goal:  Independent with all ADLs and mobility, be able to cook for family  PARTIALLY MET      TREATMENT PROVIDED:  -Re-evaluation - 15 min  -Therapeutic Exercise:  20 min   - Sciatic n glides - 20x   - Knee extensions - 3 x 10   - Hip abd/add with resistance- 3 x 10     - HS curls - held   - Bike - 3'   - UBE - held   - SL TG squats - held   - Stairs (4 steps) - held   - Seated marches - 30x    ASSESSMENT:  Patient limited in activities lately due to illness.  Patient's therex modified to decrease exercise intensity and duration to prevent fatigue.  Patient with decreased mobility as a result of general malaise.  Further PT needed to return to previous level of function.    PLAN:  Patient will benefit from physical therapy (2-3) x/week for (4-6) weeks including therapeutic exercise, functional activities, neuromuscular re-education, gait training, modalities, and patient education.

## 2018-09-11 ENCOUNTER — LAB VISIT (OUTPATIENT)
Dept: LAB | Facility: HOSPITAL | Age: 53
End: 2018-09-11
Attending: INTERNAL MEDICINE
Payer: MEDICAID

## 2018-09-11 DIAGNOSIS — Z94.4 LIVER REPLACED BY TRANSPLANT: ICD-10-CM

## 2018-09-11 LAB
ALBUMIN SERPL BCP-MCNC: 3.6 G/DL
ALP SERPL-CCNC: 140 U/L
ALT SERPL W/O P-5'-P-CCNC: 17 U/L
ANION GAP SERPL CALC-SCNC: 9 MMOL/L
AST SERPL-CCNC: 17 U/L
BASOPHILS # BLD AUTO: 0.02 K/UL
BASOPHILS NFR BLD: 0.2 %
BILIRUB SERPL-MCNC: 0.8 MG/DL
BUN SERPL-MCNC: 14 MG/DL
CALCIUM SERPL-MCNC: 9.7 MG/DL
CHLORIDE SERPL-SCNC: 105 MMOL/L
CO2 SERPL-SCNC: 28 MMOL/L
CREAT SERPL-MCNC: 0.8 MG/DL
DIFFERENTIAL METHOD: ABNORMAL
EOSINOPHIL # BLD AUTO: 0.1 K/UL
EOSINOPHIL NFR BLD: 0.7 %
ERYTHROCYTE [DISTWIDTH] IN BLOOD BY AUTOMATED COUNT: 16.7 %
EST. GFR  (AFRICAN AMERICAN): >60 ML/MIN/1.73 M^2
EST. GFR  (NON AFRICAN AMERICAN): >60 ML/MIN/1.73 M^2
GLUCOSE SERPL-MCNC: 92 MG/DL
HCT VFR BLD AUTO: 36.3 %
HGB BLD-MCNC: 11.2 G/DL
IMM GRANULOCYTES # BLD AUTO: 0.03 K/UL
IMM GRANULOCYTES NFR BLD AUTO: 0.3 %
LYMPHOCYTES # BLD AUTO: 1.2 K/UL
LYMPHOCYTES NFR BLD: 12.9 %
MAGNESIUM SERPL-MCNC: 1.2 MG/DL
MCH RBC QN AUTO: 24.7 PG
MCHC RBC AUTO-ENTMCNC: 30.9 G/DL
MCV RBC AUTO: 80 FL
MONOCYTES # BLD AUTO: 0.6 K/UL
MONOCYTES NFR BLD: 5.8 %
NEUTROPHILS # BLD AUTO: 7.7 K/UL
NEUTROPHILS NFR BLD: 80.1 %
NRBC BLD-RTO: 0 /100 WBC
PLATELET # BLD AUTO: 92 K/UL
PMV BLD AUTO: ABNORMAL FL
POTASSIUM SERPL-SCNC: 3.5 MMOL/L
PROT SERPL-MCNC: 6.9 G/DL
RBC # BLD AUTO: 4.53 M/UL
SODIUM SERPL-SCNC: 142 MMOL/L
WBC # BLD AUTO: 9.6 K/UL

## 2018-09-11 PROCEDURE — 83735 ASSAY OF MAGNESIUM: CPT

## 2018-09-11 PROCEDURE — 36415 COLL VENOUS BLD VENIPUNCTURE: CPT | Mod: PO

## 2018-09-11 PROCEDURE — 85025 COMPLETE CBC W/AUTO DIFF WBC: CPT

## 2018-09-11 PROCEDURE — 80053 COMPREHEN METABOLIC PANEL: CPT

## 2018-09-11 PROCEDURE — 80197 ASSAY OF TACROLIMUS: CPT

## 2018-09-12 LAB — TACROLIMUS BLD-MCNC: 12.9 NG/ML

## 2018-09-13 ENCOUNTER — OFFICE VISIT (OUTPATIENT)
Dept: URGENT CARE | Facility: CLINIC | Age: 53
End: 2018-09-13
Payer: MEDICAID

## 2018-09-13 ENCOUNTER — HOSPITAL ENCOUNTER (OUTPATIENT)
Dept: RADIOLOGY | Facility: HOSPITAL | Age: 53
Discharge: HOME | End: 2018-09-13
Attending: NURSE PRACTITIONER
Payer: MEDICAID

## 2018-09-13 VITALS
WEIGHT: 184.31 LBS | HEIGHT: 63 IN | HEART RATE: 86 BPM | BODY MASS INDEX: 32.66 KG/M2 | SYSTOLIC BLOOD PRESSURE: 110 MMHG | DIASTOLIC BLOOD PRESSURE: 68 MMHG | TEMPERATURE: 98 F | RESPIRATION RATE: 16 BRPM

## 2018-09-13 DIAGNOSIS — R05.9 COUGH: ICD-10-CM

## 2018-09-13 DIAGNOSIS — R52 BODY ACHES: Primary | ICD-10-CM

## 2018-09-13 DIAGNOSIS — J01.00 ACUTE NON-RECURRENT MAXILLARY SINUSITIS: ICD-10-CM

## 2018-09-13 DIAGNOSIS — J02.9 SORE THROAT: ICD-10-CM

## 2018-09-13 LAB
CTP QC/QA: YES
CTP QC/QA: YES
FLUAV AG NPH QL: NEGATIVE
FLUBV AG NPH QL: NEGATIVE
S PYO RRNA THROAT QL PROBE: NEGATIVE

## 2018-09-13 PROCEDURE — 87804 INFLUENZA ASSAY W/OPTIC: CPT | Mod: 59,PBBFAC,PO | Performed by: NURSE PRACTITIONER

## 2018-09-13 PROCEDURE — 99999 PR PBB SHADOW E&M-EST. PATIENT-LVL V: CPT | Mod: PBBFAC,,, | Performed by: NURSE PRACTITIONER

## 2018-09-13 PROCEDURE — 71046 X-RAY EXAM CHEST 2 VIEWS: CPT | Mod: 26,,, | Performed by: RADIOLOGY

## 2018-09-13 PROCEDURE — 87880 STREP A ASSAY W/OPTIC: CPT | Mod: PBBFAC,PO | Performed by: NURSE PRACTITIONER

## 2018-09-13 PROCEDURE — 71046 X-RAY EXAM CHEST 2 VIEWS: CPT | Mod: TC,FY,PO

## 2018-09-13 PROCEDURE — 87081 CULTURE SCREEN ONLY: CPT

## 2018-09-13 PROCEDURE — 99214 OFFICE O/P EST MOD 30 MIN: CPT | Mod: S$PBB,,, | Performed by: NURSE PRACTITIONER

## 2018-09-13 PROCEDURE — 99215 OFFICE O/P EST HI 40 MIN: CPT | Mod: PBBFAC,25,PO | Performed by: NURSE PRACTITIONER

## 2018-09-13 RX ORDER — AMOXICILLIN AND CLAVULANATE POTASSIUM 875; 125 MG/1; MG/1
1 TABLET, FILM COATED ORAL EVERY 12 HOURS
Qty: 20 TABLET | Refills: 0 | Status: SHIPPED | OUTPATIENT
Start: 2018-09-13 | End: 2018-10-02 | Stop reason: ALTCHOICE

## 2018-09-13 NOTE — PROGRESS NOTES
Subjective:       Patient ID: Marcie Bazan is a 52 y.o. female.    Chief Complaint: Headache; Nausea (x two weeks ); Cough; and Generalized Body Aches    52 year old female presents to Urgent Care with reports of body aches, fever,cough and sore throat that has been present on and off 3 weeks. Denies any other problems or concerns at this time.       Influenza   This is a new problem. The current episode started 1 to 4 weeks ago. Associated symptoms include congestion, coughing, fatigue, headaches, nausea and a sore throat. Pertinent negatives include no abdominal pain, chest pain, chills, fever, numbness, rash, vomiting or weakness. Nothing aggravates the symptoms. She has tried nothing for the symptoms.     Review of Systems   Constitutional: Positive for fatigue. Negative for appetite change, chills and fever.   HENT: Positive for congestion and sore throat. Negative for ear pain, sinus pressure and trouble swallowing.    Eyes: Negative for visual disturbance.   Respiratory: Positive for cough. Negative for shortness of breath.    Cardiovascular: Negative for chest pain.   Gastrointestinal: Positive for nausea. Negative for abdominal pain, diarrhea and vomiting.   Endocrine: Negative for cold intolerance, polyphagia and polyuria.   Genitourinary: Negative for decreased urine volume and dysuria.   Musculoskeletal: Negative for back pain.   Skin: Negative for rash.   Allergic/Immunologic: Negative for environmental allergies and food allergies.   Neurological: Positive for headaches. Negative for dizziness, tremors, weakness and numbness.   Hematological: Does not bruise/bleed easily.   Psychiatric/Behavioral: Negative for confusion and hallucinations. The patient is not nervous/anxious and is not hyperactive.    All other systems reviewed and are negative.      Objective:     Physical Exam   Constitutional: She is oriented to person, place, and time. She appears well-developed and well-nourished.   HENT:    Head: Normocephalic and atraumatic.   Right Ear: External ear normal.   Left Ear: External ear normal.   Nose: Nose normal.   Mouth/Throat: Oropharynx is clear and moist.   Eyes: Conjunctivae and EOM are normal. Pupils are equal, round, and reactive to light.   Neck: Normal range of motion. Neck supple.   Cardiovascular: Normal rate, regular rhythm, normal heart sounds and intact distal pulses.   No murmur heard.  Pulmonary/Chest: Effort normal and breath sounds normal. She has no wheezes.   Abdominal: Soft. Bowel sounds are normal. There is no tenderness.   Musculoskeletal: Normal range of motion.   Neurological: She is alert and oriented to person, place, and time. She has normal reflexes.   Skin: Skin is warm and dry. No rash noted.   Psychiatric: She has a normal mood and affect. Her behavior is normal. Judgment and thought content normal.   Nursing note and vitals reviewed.    Assessment:     1. Body aches    2. Sore throat    3. Cough    4. Acute non-recurrent maxillary sinusitis    Instructed patient to follow up with PCP if symptoms do not improve or worsen. Denies any other problems or concerns at this time.   Plan:   Marcie was seen today for headache, nausea, cough and generalized body aches.    Diagnoses and all orders for this visit:    Body aches  -     POCT Influenza A/B    Sore throat  -     POCT Rapid Strep A  -     Strep A culture, throat    Cough  -     X-Ray Chest PA And Lateral; Future    Acute non-recurrent maxillary sinusitis  -     Ambulatory referral to Internal Medicine    Other orders  -     amoxicillin-clavulanate 875-125mg (AUGMENTIN) 875-125 mg per tablet; Take 1 tablet by mouth every 12 (twelve) hours.

## 2018-09-14 DIAGNOSIS — Z94.4 LIVER REPLACED BY TRANSPLANT: ICD-10-CM

## 2018-09-14 DIAGNOSIS — E83.42 HYPOMAGNESEMIA: ICD-10-CM

## 2018-09-14 RX ORDER — TACROLIMUS 1 MG/1
2 CAPSULE ORAL EVERY 12 HOURS
Qty: 120 CAPSULE | Refills: 11 | Status: SHIPPED | OUTPATIENT
Start: 2018-09-14 | End: 2019-06-26

## 2018-09-14 RX ORDER — LANOLIN ALCOHOL/MO/W.PET/CERES
2 CREAM (GRAM) TOPICAL 3 TIMES DAILY
Qty: 180 TABLET | Refills: 6 | Status: SHIPPED | OUTPATIENT
Start: 2018-09-14 | End: 2018-10-26 | Stop reason: SDUPTHER

## 2018-09-14 NOTE — TELEPHONE ENCOUNTER
----- Message from Joselin Souza MD sent at 9/13/2018  5:24 PM CDT -----  Spoke with patient advised to decrease tacrolimus to 2 mg twice a day.  Also increased magnesium to 800 mg t.i.d..  Needs repeat labs including magnesium on Monday.  She reports she did go to urgent care and was diagnosed with an infection for which she will be starting antibiotics.  She was unsure of the exact name of the antibiotic.  She also reports she does have a primary care doctor about an appointment scheduled for Tuesday.

## 2018-09-16 LAB — BACTERIA THROAT CULT: NORMAL

## 2018-09-17 ENCOUNTER — LAB VISIT (OUTPATIENT)
Dept: LAB | Facility: HOSPITAL | Age: 53
End: 2018-09-17
Attending: INTERNAL MEDICINE
Payer: MEDICAID

## 2018-09-17 DIAGNOSIS — Z94.4 LIVER REPLACED BY TRANSPLANT: ICD-10-CM

## 2018-09-17 LAB
ALBUMIN SERPL BCP-MCNC: 3.7 G/DL
ALP SERPL-CCNC: 153 U/L
ALT SERPL W/O P-5'-P-CCNC: 19 U/L
ANION GAP SERPL CALC-SCNC: 9 MMOL/L
ANISOCYTOSIS BLD QL SMEAR: SLIGHT
AST SERPL-CCNC: 18 U/L
BASOPHILS # BLD AUTO: 0.03 K/UL
BASOPHILS NFR BLD: 0.4 %
BILIRUB SERPL-MCNC: 0.7 MG/DL
BUN SERPL-MCNC: 15 MG/DL
CALCIUM SERPL-MCNC: 10 MG/DL
CHLORIDE SERPL-SCNC: 105 MMOL/L
CO2 SERPL-SCNC: 27 MMOL/L
CREAT SERPL-MCNC: 0.9 MG/DL
DIFFERENTIAL METHOD: ABNORMAL
EOSINOPHIL # BLD AUTO: 0.1 K/UL
EOSINOPHIL NFR BLD: 0.9 %
ERYTHROCYTE [DISTWIDTH] IN BLOOD BY AUTOMATED COUNT: 16.8 %
EST. GFR  (AFRICAN AMERICAN): >60 ML/MIN/1.73 M^2
EST. GFR  (NON AFRICAN AMERICAN): >60 ML/MIN/1.73 M^2
GIANT PLATELETS BLD QL SMEAR: PRESENT
GLUCOSE SERPL-MCNC: 90 MG/DL
HCT VFR BLD AUTO: 37.8 %
HGB BLD-MCNC: 11.4 G/DL
IMM GRANULOCYTES # BLD AUTO: 0.02 K/UL
IMM GRANULOCYTES NFR BLD AUTO: 0.3 %
LYMPHOCYTES # BLD AUTO: 1.3 K/UL
LYMPHOCYTES NFR BLD: 17.4 %
MAGNESIUM SERPL-MCNC: 1.3 MG/DL
MCH RBC QN AUTO: 24.3 PG
MCHC RBC AUTO-ENTMCNC: 30.2 G/DL
MCV RBC AUTO: 80 FL
MONOCYTES # BLD AUTO: 0.6 K/UL
MONOCYTES NFR BLD: 7.9 %
NEUTROPHILS # BLD AUTO: 5.6 K/UL
NEUTROPHILS NFR BLD: 73.1 %
NRBC BLD-RTO: 0 /100 WBC
OVALOCYTES BLD QL SMEAR: ABNORMAL
PLATELET # BLD AUTO: 223 K/UL
PLATELET BLD QL SMEAR: ABNORMAL
PMV BLD AUTO: 11.1 FL
POIKILOCYTOSIS BLD QL SMEAR: SLIGHT
POTASSIUM SERPL-SCNC: 3.9 MMOL/L
PROT SERPL-MCNC: 7.2 G/DL
RBC # BLD AUTO: 4.7 M/UL
SODIUM SERPL-SCNC: 141 MMOL/L
WBC # BLD AUTO: 7.7 K/UL

## 2018-09-17 PROCEDURE — 83735 ASSAY OF MAGNESIUM: CPT

## 2018-09-17 PROCEDURE — 36415 COLL VENOUS BLD VENIPUNCTURE: CPT | Mod: PO

## 2018-09-17 PROCEDURE — 80053 COMPREHEN METABOLIC PANEL: CPT

## 2018-09-17 PROCEDURE — 85025 COMPLETE CBC W/AUTO DIFF WBC: CPT

## 2018-09-17 PROCEDURE — 80197 ASSAY OF TACROLIMUS: CPT

## 2018-09-18 ENCOUNTER — OFFICE VISIT (OUTPATIENT)
Dept: INTERNAL MEDICINE | Facility: CLINIC | Age: 53
End: 2018-09-18
Payer: MEDICAID

## 2018-09-18 VITALS
TEMPERATURE: 98 F | HEIGHT: 63 IN | DIASTOLIC BLOOD PRESSURE: 84 MMHG | SYSTOLIC BLOOD PRESSURE: 124 MMHG | BODY MASS INDEX: 32.58 KG/M2 | WEIGHT: 183.88 LBS | HEART RATE: 89 BPM | OXYGEN SATURATION: 98 %

## 2018-09-18 DIAGNOSIS — E55.9 VITAMIN D DEFICIENCY: ICD-10-CM

## 2018-09-18 DIAGNOSIS — Z94.4 LIVER REPLACED BY TRANSPLANT: ICD-10-CM

## 2018-09-18 DIAGNOSIS — E78.2 MIXED HYPERLIPIDEMIA: ICD-10-CM

## 2018-09-18 DIAGNOSIS — E66.9 OBESITY (BMI 30.0-34.9): ICD-10-CM

## 2018-09-18 DIAGNOSIS — D63.8 ANEMIA OF CHRONIC DISEASE: ICD-10-CM

## 2018-09-18 DIAGNOSIS — E83.42 HYPOMAGNESEMIA: ICD-10-CM

## 2018-09-18 DIAGNOSIS — M47.26 OSTEOARTHRITIS OF SPINE WITH RADICULOPATHY, LUMBAR REGION: Primary | ICD-10-CM

## 2018-09-18 DIAGNOSIS — I10 ESSENTIAL HYPERTENSION: Chronic | ICD-10-CM

## 2018-09-18 DIAGNOSIS — J01.00 ACUTE NON-RECURRENT MAXILLARY SINUSITIS: ICD-10-CM

## 2018-09-18 PROBLEM — R74.8 ELEVATED LIVER ENZYMES: Status: RESOLVED | Noted: 2018-02-27 | Resolved: 2018-09-18

## 2018-09-18 PROBLEM — D72.819 LEUKOPENIA: Status: RESOLVED | Noted: 2018-02-12 | Resolved: 2018-09-18

## 2018-09-18 LAB — TACROLIMUS BLD-MCNC: 7.1 NG/ML

## 2018-09-18 PROCEDURE — 99213 OFFICE O/P EST LOW 20 MIN: CPT | Mod: PBBFAC,PO | Performed by: FAMILY MEDICINE

## 2018-09-18 PROCEDURE — 99214 OFFICE O/P EST MOD 30 MIN: CPT | Mod: S$PBB,,, | Performed by: FAMILY MEDICINE

## 2018-09-18 PROCEDURE — 99999 PR PBB SHADOW E&M-EST. PATIENT-LVL III: CPT | Mod: PBBFAC,,, | Performed by: FAMILY MEDICINE

## 2018-09-18 RX ORDER — CYCLOBENZAPRINE HCL 5 MG
5 TABLET ORAL 3 TIMES DAILY PRN
Qty: 30 TABLET | Refills: 1 | Status: SHIPPED | OUTPATIENT
Start: 2018-09-18 | End: 2018-11-06

## 2018-09-18 NOTE — PROGRESS NOTES
Subjective:       Patient ID: Marcie Bazan is a 52 y.o. female.    Chief Complaint: Specialty Services Required    52-year-old  female patient new to me with Patient Active Problem List:     Allergic rhinitis     Anemia of chronic disease     Erosive esophagitis     Essential hypertension     History of abnormal cervical Pap smear     History of Helicobacter pylori infection     Lipoma     Multinodular thyroid     Hypomagnesemia     Liver replaced by transplant     Adrenal cortical steroids causing adverse effect in therapeutic use     At risk for opportunistic infections     Grade II hemorrhoids     Long-term use of immunosuppressant medication     Accelerated liver transplant rejection     Osteoarthritis of spine with radiculopathy, lumbar region     Vitamin D deficiency     Mixed hyperlipidemia  Here complains of right leg pain, up to 8 to 10/10.  Patient was recently seen at urgent care for sinus infection and currently on antibiotics, reports that she has been feeling better but the cough but continues to have body aches and right leg pain. Patient status post liver transplant 9 months ago.   Has been followed by transplant team  Has been taking her medications regularly but reports that she has not been checked on cholesterol levels lately  Patient was told that she has low magnesium and has been increased on magnesium supplements to 400 mg 3 tablets 3 times daily.   Patient has been having muscle spasms off and on lately and requesting refill on Flexeril  Reports that she was on prednisone for a long time but has been discontinued for the past 1 month, was gaining weight being on prednisone but feels better since discontinuation of prednisone  Denies any chest pain or shortness of breath  Reports that she has been getting physical therapy once a week and has been having difficulty with walking due to low back radiating to the right leg      Review of Systems   Constitutional: Negative for  "fatigue and fever.   HENT: Positive for sinus pressure. Negative for congestion.    Eyes: Negative for visual disturbance.   Respiratory: Positive for cough. Negative for shortness of breath.    Cardiovascular: Negative for chest pain and leg swelling.   Gastrointestinal: Negative for abdominal pain, nausea and vomiting.   Musculoskeletal: Positive for back pain, gait problem and myalgias.   Skin: Negative for rash.   Neurological: Positive for numbness. Negative for weakness, light-headedness and headaches.   Psychiatric/Behavioral: Negative for sleep disturbance.         /84 (BP Location: Right arm, Patient Position: Sitting)   Pulse 89   Temp 98.1 °F (36.7 °C) (Tympanic)   Ht 5' 3" (1.6 m)   Wt 83.4 kg (183 lb 13.8 oz)   LMP 01/01/1985 (Approximate) Comment: 1985  SpO2 98%   BMI 32.57 kg/m²   Objective:      Physical Exam   Constitutional: She is oriented to person, place, and time. She appears well-developed and well-nourished.   HENT:   Head: Normocephalic and atraumatic.   Mouth/Throat: Oropharynx is clear and moist. No oropharyngeal exudate.   Neck: Neck supple.   Cardiovascular: Normal rate, regular rhythm and normal heart sounds.   No murmur heard.  Pulmonary/Chest: Effort normal and breath sounds normal. She has no wheezes.   Abdominal: Soft. Bowel sounds are normal. There is no tenderness.   Musculoskeletal: She exhibits tenderness. She exhibits no edema.   Positive for tenderness to the paraspinal lumbar muscles in the midline and right thigh muscles laterally   Neurological: She is alert and oriented to person, place, and time.   Patient with gait disability not able to lift left leg and seems to be dragging   Slow gait noted secondary to right thigh pain   Skin: Skin is warm and dry. No rash noted.   Psychiatric: She has a normal mood and affect.         Assessment:       1. Osteoarthritis of spine with radiculopathy, lumbar region    2. Essential hypertension    3. Anemia of chronic " disease    4. Hypomagnesemia    5. Liver replaced by transplant    6. Mixed hyperlipidemia    7. Vitamin D deficiency    8. Acute non-recurrent maxillary sinusitis    9. Obesity (BMI 30.0-34.9)        Plan:   Osteoarthritis of spine with radiculopathy, lumbar region  -     cyclobenzaprine (FLEXERIL) 5 MG tablet; Take 1 tablet (5 mg total) by mouth 3 (three) times daily as needed for Muscle spasms.  Dispense: 30 tablet; Refill: 1  -     Ambulatory referral to Pain Clinic  -     CK; Future; Expected date: 09/18/2018  -     Aldolase; Future; Expected date: 09/18/2018  Reviewed previous CT scan lumbar spine showing degenerative disc disease.   Likely causing radiculopathy  Refill given on Flexeril  Patient may benefit by seeing spine specialist secondary to to gait disability  Currently getting physical therapy weekly      Essential hypertension  -     Lipid panel; Future; Expected date: 09/18/2018  -     TSH; Future; Expected date: 09/18/2018  Blood pressure is stable today currently on Procardia 60 mg    Anemia of chronic disease  Hypomagnesemia  Liver replaced by transplant  Followed by transplant team    Mixed hyperlipidemia-currently taking Lipitor 40 mg daily  Will check fasting lipid profile    Vitamin D deficiency  -     Vitamin D; Future; Expected date: 09/18/2018  Will recheck vitamin-D levels as it was low in the past    Acute non-recurrent maxillary sinusitis-finish antibiotics Augmentin as prescribed to Urgent Care  Patient clinically improving    Obesity (BMI 30.0-34.9)  Lifestyle modifications recommended    Patient was encouraged to establish with PCP as available secondary to her insurance

## 2018-09-18 NOTE — LETTER
September 18, 2018      Myra Kraft, [facility]  [address] [address]           [facility]  [address] [place] [address]  Phone: [phone]  Fax: [phone]          Patient: Marcie Bazan   MR Number: [id number]   YOB: 1965   Date of Visit: 9/18/2018       Dear Myra Kraft:    Thank you for referring Marcie Bazan to me for evaluation. Attached you will find relevant portions of my assessment and plan of care.    If you have questions, please do not hesitate to call me. I look forward to following Marcie Bazan along with you.    Sincerely,    Katy Childress MD    Enclosure  CC:  No Recipients    If you would like to receive this communication electronically, please contact [email or web address] or [phone] to request more information on [employer] Link access.    For providers and/or their staff who would like to refer a patient to [facility], please contact us through our one-stop-shop provider referral line, [facility] , at [phone].    If you feel you have received this communication in error or would no longer like to receive these types of communications, please e-mail [email or web address]

## 2018-09-19 ENCOUNTER — TELEPHONE (OUTPATIENT)
Dept: PAIN MEDICINE | Facility: CLINIC | Age: 53
End: 2018-09-19

## 2018-09-19 ENCOUNTER — LAB VISIT (OUTPATIENT)
Dept: LAB | Facility: HOSPITAL | Age: 53
End: 2018-09-19
Attending: FAMILY MEDICINE
Payer: MEDICAID

## 2018-09-19 DIAGNOSIS — E55.9 VITAMIN D DEFICIENCY: ICD-10-CM

## 2018-09-19 DIAGNOSIS — I10 ESSENTIAL HYPERTENSION: Chronic | ICD-10-CM

## 2018-09-19 DIAGNOSIS — M47.26 OSTEOARTHRITIS OF SPINE WITH RADICULOPATHY, LUMBAR REGION: ICD-10-CM

## 2018-09-19 LAB
25(OH)D3+25(OH)D2 SERPL-MCNC: 21 NG/ML
CHOLEST SERPL-MCNC: 121 MG/DL
CHOLEST/HDLC SERPL: 3.2 {RATIO}
CK SERPL-CCNC: 112 U/L
HDLC SERPL-MCNC: 38 MG/DL
HDLC SERPL: 31.4 %
LDLC SERPL CALC-MCNC: 59 MG/DL
NONHDLC SERPL-MCNC: 83 MG/DL
TRIGL SERPL-MCNC: 120 MG/DL
TSH SERPL DL<=0.005 MIU/L-ACNC: 1.05 UIU/ML

## 2018-09-19 PROCEDURE — 84443 ASSAY THYROID STIM HORMONE: CPT

## 2018-09-19 PROCEDURE — 36415 COLL VENOUS BLD VENIPUNCTURE: CPT | Mod: PO

## 2018-09-19 PROCEDURE — 82550 ASSAY OF CK (CPK): CPT

## 2018-09-19 PROCEDURE — 80061 LIPID PANEL: CPT

## 2018-09-19 PROCEDURE — 82306 VITAMIN D 25 HYDROXY: CPT

## 2018-09-19 PROCEDURE — 82085 ASSAY OF ALDOLASE: CPT

## 2018-09-19 NOTE — TELEPHONE ENCOUNTER
Contacted patient; appointment scheduled with Dr. Desai on October 2nd at 4:30PM. Pt verbalized understanding.

## 2018-09-21 DIAGNOSIS — E55.9 VITAMIN D DEFICIENCY: Primary | ICD-10-CM

## 2018-09-21 LAB — ALDOLASE SERPL-CCNC: 5.3 U/L

## 2018-09-21 RX ORDER — ERGOCALCIFEROL 1.25 MG/1
50000 CAPSULE ORAL
Qty: 10 CAPSULE | Refills: 0 | Status: SHIPPED | OUTPATIENT
Start: 2018-09-21 | End: 2018-11-02

## 2018-09-22 ENCOUNTER — TELEPHONE (OUTPATIENT)
Dept: TRANSPLANT | Facility: CLINIC | Age: 53
End: 2018-09-22

## 2018-09-22 NOTE — TELEPHONE ENCOUNTER
Dr. Souza reviewed your labs.  Sent message to patient to let her know that labs ok but magnesium still low so the doctor would like her to repeat labs on Monday.

## 2018-09-22 NOTE — TELEPHONE ENCOUNTER
----- Message from Joselin Souza MD sent at 9/22/2018  7:18 AM CDT -----  Magnesium still low continue with three time a day med, repeat labs with magnesium next week

## 2018-09-24 ENCOUNTER — LAB VISIT (OUTPATIENT)
Dept: LAB | Facility: HOSPITAL | Age: 53
End: 2018-09-24
Attending: INTERNAL MEDICINE
Payer: MEDICAID

## 2018-09-24 ENCOUNTER — CLINICAL SUPPORT (OUTPATIENT)
Dept: REHABILITATION | Facility: HOSPITAL | Age: 53
End: 2018-09-24
Payer: COMMERCIAL

## 2018-09-24 DIAGNOSIS — Z94.4 LIVER REPLACED BY TRANSPLANT: ICD-10-CM

## 2018-09-24 DIAGNOSIS — M54.32 SCIATICA, LEFT SIDE: ICD-10-CM

## 2018-09-24 DIAGNOSIS — R53.1 WEAKNESS: ICD-10-CM

## 2018-09-24 DIAGNOSIS — R53.81 PHYSICAL DECONDITIONING: Primary | ICD-10-CM

## 2018-09-24 LAB
ALBUMIN SERPL BCP-MCNC: 3.5 G/DL
ALP SERPL-CCNC: 149 U/L
ALT SERPL W/O P-5'-P-CCNC: 27 U/L
ANION GAP SERPL CALC-SCNC: 8 MMOL/L
AST SERPL-CCNC: 22 U/L
BASOPHILS # BLD AUTO: 0.02 K/UL
BASOPHILS NFR BLD: 0.3 %
BILIRUB SERPL-MCNC: 0.5 MG/DL
BUN SERPL-MCNC: 13 MG/DL
CALCIUM SERPL-MCNC: 9.9 MG/DL
CHLORIDE SERPL-SCNC: 108 MMOL/L
CO2 SERPL-SCNC: 28 MMOL/L
CREAT SERPL-MCNC: 0.8 MG/DL
DIFFERENTIAL METHOD: ABNORMAL
EOSINOPHIL # BLD AUTO: 0.1 K/UL
EOSINOPHIL NFR BLD: 1 %
ERYTHROCYTE [DISTWIDTH] IN BLOOD BY AUTOMATED COUNT: 16.7 %
EST. GFR  (AFRICAN AMERICAN): >60 ML/MIN/1.73 M^2
EST. GFR  (NON AFRICAN AMERICAN): >60 ML/MIN/1.73 M^2
GLUCOSE SERPL-MCNC: 82 MG/DL
HCT VFR BLD AUTO: 36.4 %
HGB BLD-MCNC: 11.2 G/DL
IMM GRANULOCYTES # BLD AUTO: 0.01 K/UL
IMM GRANULOCYTES NFR BLD AUTO: 0.1 %
LYMPHOCYTES # BLD AUTO: 1.3 K/UL
LYMPHOCYTES NFR BLD: 18.6 %
MAGNESIUM SERPL-MCNC: 1.6 MG/DL
MCH RBC QN AUTO: 24.7 PG
MCHC RBC AUTO-ENTMCNC: 30.8 G/DL
MCV RBC AUTO: 80 FL
MONOCYTES # BLD AUTO: 0.6 K/UL
MONOCYTES NFR BLD: 8.1 %
NEUTROPHILS # BLD AUTO: 4.9 K/UL
NEUTROPHILS NFR BLD: 71.9 %
NRBC BLD-RTO: 0 /100 WBC
PLATELET # BLD AUTO: 41 K/UL
PMV BLD AUTO: 12 FL
POTASSIUM SERPL-SCNC: 3.6 MMOL/L
PROT SERPL-MCNC: 6.7 G/DL
RBC # BLD AUTO: 4.54 M/UL
SODIUM SERPL-SCNC: 144 MMOL/L
WBC # BLD AUTO: 6.77 K/UL

## 2018-09-24 PROCEDURE — 97110 THERAPEUTIC EXERCISES: CPT

## 2018-09-24 PROCEDURE — 80197 ASSAY OF TACROLIMUS: CPT

## 2018-09-24 PROCEDURE — 36415 COLL VENOUS BLD VENIPUNCTURE: CPT | Mod: PO

## 2018-09-24 PROCEDURE — 83735 ASSAY OF MAGNESIUM: CPT

## 2018-09-24 PROCEDURE — 85025 COMPLETE CBC W/AUTO DIFF WBC: CPT

## 2018-09-24 PROCEDURE — 80053 COMPREHEN METABOLIC PANEL: CPT

## 2018-09-24 NOTE — PROGRESS NOTES
PHYSICAL THERAPY DAILY NOTE    Referring Provider:  Devon Robles    Diagnosis:         ICD-10-CM ICD-9-CM    1. Physical deconditioning R53.81 799.3    2. Sciatica, left side M54.32 724.3    3. Weakness R53.1 780.79        Orders:  Evaluate and Treat    Date of Initial Evaluation: 1/16/18    Visit # 17 of 20    BACKGROUND:  Patient returns to PT after 1 month off due to medical issues.  She c/o continued weakness in her L LE and decreased movement in her L shoulder.    Subjective 9/24/18 - Patient reports that she's feeling pretty good today.  Patient states that she is going to see a back specialist about her L LE weakness next week.    OBJECTIVE:  Updated 9/10    TREATMENT PROVIDED:  -Therapeutic Exercise:  40 min   - Sciatic n glides - 20x   - Knee extensions - 3 x 10   - Hip abd/add with resistance- 3 x 10  30#   - HS curls - 3 x 10  30#   - Bike - 6'   - TG squats - 4'  Level 4   - Step ups - 3 x 10 alternating LEs with CGA for balance   - Seated marches - 30x    ASSESSMENT:  Patient with significant weakness in both LEs with L > R.  Patient also with balance deficits on L side as well. Pt to receive further medical evaluation in the coming weeks to determine the source of the weakness.    PLAN:  Patient will benefit from physical therapy (2-3) x/week for (4-6) weeks including therapeutic exercise, functional activities, neuromuscular re-education, gait training, modalities, and patient education.

## 2018-09-25 LAB — TACROLIMUS BLD-MCNC: 7 NG/ML

## 2018-09-28 ENCOUNTER — TELEPHONE (OUTPATIENT)
Dept: TRANSPLANT | Facility: CLINIC | Age: 53
End: 2018-09-28

## 2018-09-28 NOTE — TELEPHONE ENCOUNTER
----- Message from Joselin Souza MD sent at 9/27/2018  6:21 PM CDT -----  Magnesium level has improved.  Repeat labs in 2 weeks

## 2018-09-28 NOTE — TELEPHONE ENCOUNTER
Dr. Souza reviewed your labs.  Continue routine labs no changes needed.  Sent message and letter for patient to repeat labs on 10/08/18

## 2018-10-01 ENCOUNTER — CLINICAL SUPPORT (OUTPATIENT)
Dept: REHABILITATION | Facility: HOSPITAL | Age: 53
End: 2018-10-01
Payer: MEDICAID

## 2018-10-01 DIAGNOSIS — R53.81 PHYSICAL DECONDITIONING: Primary | ICD-10-CM

## 2018-10-01 DIAGNOSIS — M54.32 SCIATICA, LEFT SIDE: ICD-10-CM

## 2018-10-01 DIAGNOSIS — R53.1 WEAKNESS: ICD-10-CM

## 2018-10-01 PROCEDURE — 97110 THERAPEUTIC EXERCISES: CPT | Mod: PO

## 2018-10-01 NOTE — PROGRESS NOTES
"PHYSICAL THERAPY DAILY NOTE    Referring Provider:  Devon Robles    Diagnosis:         ICD-10-CM ICD-9-CM    1. Physical deconditioning R53.81 799.3    2. Sciatica, left side M54.32 724.3    3. Weakness R53.1 780.79        Orders:  Evaluate and Treat    Date of Initial Evaluation: 1/16/18    Visit # 18 of 20    BACKGROUND:  Patient returns to PT after 1 month off due to medical issues.  She c/o continued weakness in her L LE and decreased movement in her L shoulder.    Subjective 10/1/18 - Patient reports that she's having some stiffness in her L LE after a busy weekend.    OBJECTIVE:  Updated 9/10    TREATMENT PROVIDED:  -Therapeutic Exercise:  40 min   - Sciatic n glides - 20x   - Knee extensions - 3 x 10 10#   - Hip abd/add with resistance- 3 x 10  Ball/green TB   - HS curls - 3 x 10  3# in supine   - Bike - 6'   - TG squats - 4'  Level 4   - L quad str - 30" x 3     ASSESSMENT:  Patient with increased difficulty with therex today but able to complete activities.   Patient still with noticeable weakness in L LE.    PLAN:  Patient will benefit from physical therapy (2-3) x/week for (4-6) weeks including therapeutic exercise, functional activities, neuromuscular re-education, gait training, modalities, and patient education.          "

## 2018-10-02 ENCOUNTER — OFFICE VISIT (OUTPATIENT)
Dept: PAIN MEDICINE | Facility: CLINIC | Age: 53
End: 2018-10-02
Payer: MEDICAID

## 2018-10-02 VITALS
HEIGHT: 63 IN | BODY MASS INDEX: 32.58 KG/M2 | RESPIRATION RATE: 16 BRPM | HEART RATE: 85 BPM | WEIGHT: 183.88 LBS | DIASTOLIC BLOOD PRESSURE: 78 MMHG | SYSTOLIC BLOOD PRESSURE: 119 MMHG

## 2018-10-02 DIAGNOSIS — M54.16 LUMBAR RADICULOPATHY: Primary | ICD-10-CM

## 2018-10-02 DIAGNOSIS — M47.812 SPONDYLOSIS OF CERVICAL REGION WITHOUT MYELOPATHY OR RADICULOPATHY: ICD-10-CM

## 2018-10-02 DIAGNOSIS — M47.816 LUMBAR SPONDYLOSIS: ICD-10-CM

## 2018-10-02 PROCEDURE — 99204 OFFICE O/P NEW MOD 45 MIN: CPT | Mod: S$PBB,,, | Performed by: PAIN MEDICINE

## 2018-10-02 PROCEDURE — 99999 PR PBB SHADOW E&M-EST. PATIENT-LVL III: CPT | Mod: PBBFAC,,, | Performed by: PAIN MEDICINE

## 2018-10-02 PROCEDURE — 99213 OFFICE O/P EST LOW 20 MIN: CPT | Mod: PBBFAC,PO | Performed by: PAIN MEDICINE

## 2018-10-02 RX ORDER — GABAPENTIN 600 MG/1
900 TABLET ORAL 3 TIMES DAILY
Qty: 90 TABLET | Refills: 3 | Status: SHIPPED | OUTPATIENT
Start: 2018-10-02 | End: 2019-02-13 | Stop reason: SDUPTHER

## 2018-10-02 NOTE — LETTER
October 2, 2018      Katy Childress MD  [address] [place] [address]           [facility]  [address] [place] [address]  Phone: [phone]  Fax: [phone]          Patient: Marcie Bazan   MR Number: [id number]   YOB: 1965   Date of Visit: 10/2/2018       Dear Dr. Katy Childress:    Thank you for referring Marcie Bazan to me for evaluation. Attached you will find relevant portions of my assessment and plan of care.    If you have questions, please do not hesitate to call me. I look forward to following Marcie Bazan along with you.    Sincerely,    David Desai MD    Enclosure  CC:  No Recipients    If you would like to receive this communication electronically, please contact [email or web address] or [phone] to request more information on [employer] Link access.    For providers and/or their staff who would like to refer a patient to [facility], please contact us through our one-stop-shop provider referral line, [facility], at [phone].    If you feel you have received this communication in error or would no longer like to receive these types of communications, please e-mail [email or web address]

## 2018-10-02 NOTE — PROGRESS NOTES
Chief Pain Complaint:  Back Pain (Pt c/o back pain, radiates to both legs; neck pain (right side only) )      History of Present Illness:   This patient is a 52 y.o. female who presents today complaining of the above noted pain/s. The patient describes the pain as follows.  Ms. Bazan has a history of hypertension, liver transplant, lumbar spondylosis who presents to clinic with complaints of right-sided cervical pain and lumbar pain which radiates into bilateral legs.  She has been having these symptoms since December 2017.  Currently she rates her pain as a 9/10 and describes the low back pain radiating leg pain as constant burning while the neck pain is described as a shocking type of pain and radiates from the low cervical spine superiorly.  The radiating pain down right leg does not go into the foot and travels in the lateral aspect of the leg and crosses medially across the knee in the L4 distribution.  She endorses bilateral lower extremity weakness.  Denies radiation of cervical pain into the arms.  She is currently working with physical therapy and has been since she underwent liver transplant in December 2017.  She denies bowel or bladder changes.    Previous Therapy:  Medications:  Gabapentin  Injections:  None  Surgeries:  None  Physical Therapy:  Has been participating since December 2017    Past Surgical History:   Procedure Laterality Date    BREAST BIOPSY Left     benign    CHOLECYSTECTOMY      COLONOSCOPY N/A 12/1/2017    Procedure: COLONOSCOPY;  Surgeon: Filemon Fuentes MD;  Location: [facility] ([address] [place] [address]);  Service: Endoscopy;  Laterality: N/A;    COLONOSCOPY N/A 12/5/2017    Procedure: COLONOSCOPY;  Surgeon: José Chavira MD;  Location: [facility] ([address] [place] [address]);  Service: Endoscopy;  Laterality: N/A;    COLONOSCOPY N/A 12/5/2017    [facility]    COLONOSCOPY N/A 12/1/2017    Performed by Filemon Fuentes MD at [facility] ([address] [place] [address])     "ESOPHAGOGASTRODUODENOSCOPY (EGD) N/A 12/1/2017    Performed by Filemon [facility]entes MD at [facility] ENDO (2ND FLR)    HYSTERECTOMY      complete     LIVER TRANSPLANT  12/2017    TRANSPLANT-LIVER N/A 12/26/2017    Performed by Romeo Moore MD at [facility] OR 2ND FLR         Imaging / Labs / Studies (reviewed on 10/2/2018):  Results for orders placed during the hospital encounter of 09/15/15   CT Lumbar Spine Without Contrast    Narrative CT of lumbar spine    History: Back pain    Technique: Standard lumbar spine CT protocol was performed without IV contrast.    Finding: Vertebral body heights and alignment are within normal limits.  Mild narrowing at L5-S1 intervertebral disc space with mild central disc bulge.  There is a moderate circumferential bulge at the L4/L5 level effacing the thecal sac.  Remaining   intervertebral discs are within normal limits.  Prevertebral soft tissues are normal.  Visualized abdominal organs are unremarkable.    Impression      Mild degenerative change with disc bulges at L4-L5 and L5-S1.     Review of Systems:  CONSTITUTIONAL: patient denies any fever, chills, or weight loss  SKIN: patient denies any rash or itching  RESPIRATORY: patient denies having any shortness of breath  GASTROINTESTINAL: patient denies having any diarrhea, constipation, or bowel incontinence  GENITOURINARY: patient denies having any abnormal bladder function    MUSCULOSKELETAL:  - patient complains of the above noted pain/s (see chief pain complaint)    NEUROLOGICAL:   - pain as above  - strength in Lower extremities is decreased, BILATERALLY  - sensation in Lower extremities is intact, BILATERALLY  - patient denies any loss of bowel or bladder control      PSYCHIATRIC: patient denies any change in mood    Other:  All other systems reviewed and are negative      Physical Exam:  /78 (BP Location: Right arm, Patient Position: Sitting)   Pulse 85   Resp 16   Ht 5' 3" (1.6 m)   Wt 83.4 kg (183 lb 13.8 oz)   LMP " 01/01/1985 (Approximate) Comment: 1985  BMI 32.57 kg/m²  (reviewed on 10/2/2018)\  General:  Alert and oriented, No acute distress.    HEENT:       EOMI.  Normocephalic, atraumatic.   Cardiovascular:  Regular rate.    Gastrointestinal:  Soft.    Respiratory:  Respirations are non-labored.    Cervical Spine:  No masses or atrophy,    Range of motion -limited Flexion, limited Extension,  limited Right Rotation, limited Left Rotation, Right Lat Bending,  Left Lat Bending         Increased pain with  flexion, extension, lateral rotation to the left and right        Palpation - tender to palpation midline and lower cervical spine and over right cervical facets        Spurling's - negative for radicular symptoms  Thoracic Spine:  Palpation normal.    Lumbar Spine:  No masses or atrophy,         Range of motion - normal Flexion, Extension,  Right Lat Bending,  Left Lat Bending        Palpation - tender palpation over lumbar facets specifically L4-5 and L5-S1        PSIS tenderness - negative bilaterally             SLR - negative for radicular symptoms  Motor Exam:    Strength:  Rate on 1-5 scale Right Left   C5-Elbow flexion, Deltoid 4 4   C6-Wrist extension 5 5   C7- Elbow / finger extension 5 5   C8- Finger flexion  5 5   T1- [employer] hand 5 5     Strength:  Rate on 1-5 scale Right Left   L2- hip flexion  4 4   L3- knee extension  4 4   L4- ankle dorsiflexion 5 5   L5- great toe extension 5 5   S1- ankle plantarflexion 5 5     Sensory Exam:  Full and equal sensation to light touch throughout.    Reflexes   Left DTR's   Biceps.   2  Triceps.   2   Brachioradialis 2.     Knee.   2  Ankle.   2  Right DTR's   Biceps.   2  Triceps.   2  Brachioradialis. 2     Knee.   2  Ankle.   2  Neurologic:  Cranial Nerves II-XII are grossly intact.    Pathologic reflexes:            Clonus - Neg   Psychiatric:  Cooperative.    Gait:  Slow, antalgic  Assessment  Lumbar Radiculopathy  Cervical Spondylosis   Lumbar spondylosis    1. 52  y.o. year old patient with PMH of   Past Medical History:   Diagnosis Date    Alcohol abuse     Alcoholic hepatitis     Anemia of chronic disease     Coagulopathy     Encounter for blood transfusion     End stage liver disease     Hypertension     Hyponatremia     Liver cirrhosis     Liver transplant candidate 12/26/2017    Obesity (BMI 30-39.9) 1/5/2016      presenting with pain located in cervical and lumbar spine, bilateral lower extremities  2. Pain Generators / Etiology :  Cervical spondylosis, lumbar spondylosis, lumbar radiculopathy  3. Failed Meds (E- Effective, NE- Not Effective):  Gabapentin-E  4. Physical Therapy - has been participating since December 2017  5. Psychological comorbidities -  none  6. Anticoagulants / Antiplatelets:  None     PLAN:  1. Medications continue gabapentin and increased to 900 mg 3 times daily  2. PT - continue participating in physical therapy and performing exercises at home  3. Psychological - none  4. Labs - obtain  none; review labs from 09/24/2018, creatinine 0.9  5. Imaging - obtain cervical x-ray, lumbar MRI; reviewed lumbar CT with patient in clinic today  6. Interventions - schedule none; consider lumbar transforaminal epidural steroid injections focusing on right L4 in the future  7. Referrals - none  8. Records - none  9. Follow up visit - follow up in clinic in 4 weeks  10. Patient Questions - answered all the patient's questions regarding diagnosis, therapy, treatment    KATJA Desai MD  Interventional Pain  [facility] [place]

## 2018-10-02 NOTE — PATIENT INSTRUCTIONS
- will get cervical xray  - will get lumbar MRI  - increase gabapentin to 900mg three times per day  - continue with physical therapy

## 2018-10-03 ENCOUNTER — HOSPITAL ENCOUNTER (OUTPATIENT)
Dept: RADIOLOGY | Facility: HOSPITAL | Age: 53
Discharge: HOME | End: 2018-10-03
Attending: PAIN MEDICINE
Payer: MEDICAID

## 2018-10-03 ENCOUNTER — TELEPHONE (OUTPATIENT)
Dept: PAIN MEDICINE | Facility: CLINIC | Age: 53
End: 2018-10-03

## 2018-10-03 DIAGNOSIS — M54.16 LUMBAR RADICULOPATHY: ICD-10-CM

## 2018-10-03 PROCEDURE — 72052 X-RAY EXAM NECK SPINE 6/>VWS: CPT | Mod: 26,,, | Performed by: RADIOLOGY

## 2018-10-03 PROCEDURE — 72052 X-RAY EXAM NECK SPINE 6/>VWS: CPT | Mod: TC,FY,PO

## 2018-10-03 NOTE — TELEPHONE ENCOUNTER
Contacted patient; informed patient of MRI that is scheduled on Oct. 15th at 7:15AM. Pt verbalized understanding.

## 2018-10-08 ENCOUNTER — CLINICAL SUPPORT (OUTPATIENT)
Dept: REHABILITATION | Facility: HOSPITAL | Age: 53
End: 2018-10-08
Payer: MEDICAID

## 2018-10-08 ENCOUNTER — LAB VISIT (OUTPATIENT)
Dept: LAB | Facility: HOSPITAL | Age: 53
End: 2018-10-08
Attending: INTERNAL MEDICINE
Payer: MEDICAID

## 2018-10-08 DIAGNOSIS — M54.32 SCIATICA, LEFT SIDE: ICD-10-CM

## 2018-10-08 DIAGNOSIS — R53.1 WEAKNESS: ICD-10-CM

## 2018-10-08 DIAGNOSIS — Z94.4 LIVER REPLACED BY TRANSPLANT: ICD-10-CM

## 2018-10-08 DIAGNOSIS — R53.81 PHYSICAL DECONDITIONING: Primary | ICD-10-CM

## 2018-10-08 LAB
ALBUMIN SERPL BCP-MCNC: 3.7 G/DL
ALP SERPL-CCNC: 176 U/L
ALT SERPL W/O P-5'-P-CCNC: 28 U/L
ANION GAP SERPL CALC-SCNC: 12 MMOL/L
AST SERPL-CCNC: 20 U/L
BASOPHILS # BLD AUTO: 0.03 K/UL
BASOPHILS NFR BLD: 0.5 %
BILIRUB SERPL-MCNC: 0.4 MG/DL
BUN SERPL-MCNC: 13 MG/DL
CALCIUM SERPL-MCNC: 10.3 MG/DL
CHLORIDE SERPL-SCNC: 105 MMOL/L
CO2 SERPL-SCNC: 25 MMOL/L
CREAT SERPL-MCNC: 0.8 MG/DL
DIFFERENTIAL METHOD: ABNORMAL
EOSINOPHIL # BLD AUTO: 0.1 K/UL
EOSINOPHIL NFR BLD: 2.1 %
ERYTHROCYTE [DISTWIDTH] IN BLOOD BY AUTOMATED COUNT: 16.2 %
EST. GFR  (AFRICAN AMERICAN): >60 ML/MIN/1.73 M^2
EST. GFR  (NON AFRICAN AMERICAN): >60 ML/MIN/1.73 M^2
GLUCOSE SERPL-MCNC: 87 MG/DL
HCT VFR BLD AUTO: 36.5 %
HGB BLD-MCNC: 11.2 G/DL
IMM GRANULOCYTES # BLD AUTO: 0.02 K/UL
IMM GRANULOCYTES NFR BLD AUTO: 0.3 %
LYMPHOCYTES # BLD AUTO: 1.4 K/UL
LYMPHOCYTES NFR BLD: 22.9 %
MAGNESIUM SERPL-MCNC: 1.6 MG/DL
MCH RBC QN AUTO: 24.5 PG
MCHC RBC AUTO-ENTMCNC: 30.7 G/DL
MCV RBC AUTO: 80 FL
MONOCYTES # BLD AUTO: 0.5 K/UL
MONOCYTES NFR BLD: 8.1 %
NEUTROPHILS # BLD AUTO: 4.1 K/UL
NEUTROPHILS NFR BLD: 66.1 %
NRBC BLD-RTO: 0 /100 WBC
PLATELET # BLD AUTO: 40 K/UL
PMV BLD AUTO: 12 FL
POTASSIUM SERPL-SCNC: 3.9 MMOL/L
PROT SERPL-MCNC: 7.1 G/DL
RBC # BLD AUTO: 4.58 M/UL
SODIUM SERPL-SCNC: 142 MMOL/L
WBC # BLD AUTO: 6.2 K/UL

## 2018-10-08 PROCEDURE — 80053 COMPREHEN METABOLIC PANEL: CPT

## 2018-10-08 PROCEDURE — 97110 THERAPEUTIC EXERCISES: CPT | Mod: PO

## 2018-10-08 PROCEDURE — 85025 COMPLETE CBC W/AUTO DIFF WBC: CPT

## 2018-10-08 PROCEDURE — 80197 ASSAY OF TACROLIMUS: CPT

## 2018-10-08 PROCEDURE — 83735 ASSAY OF MAGNESIUM: CPT

## 2018-10-08 PROCEDURE — 36415 COLL VENOUS BLD VENIPUNCTURE: CPT | Mod: PO

## 2018-10-08 NOTE — PROGRESS NOTES
"PHYSICAL THERAPY DAILY NOTE    Referring Provider:  Devon Robles    Diagnosis:         ICD-10-CM ICD-9-CM    1. Physical deconditioning R53.81 799.3    2. Sciatica, left side M54.32 724.3    3. Weakness R53.1 780.79        Orders:  Evaluate and Treat    Date of Initial Evaluation: 1/16/18    Visit # 19 of 20    BACKGROUND:  Patient returns to PT after 1 month off due to medical issues.  She c/o continued weakness in her L LE and decreased movement in her L shoulder.    Subjective 10/8/18 - Patient without new complaints.    OBJECTIVE:  Updated 9/10    TREATMENT PROVIDED:  -Therapeutic Exercise:  35 min   - Sciatic n glides - 20x   - Knee extensions - 3 x 10 10#   - Hip abd/add with resistance- 3 x 10  Ball/green TB   - HS curls - 3 x 10  3# in supine   - Bike - 6'   - TG squats - 4'  Level 4   - L quad str - 30" x 3   - UBE - 3/3     ASSESSMENT:  Patient still with lingering weakness in L LE and some in L UE.  Patient to be evaluated further by MD for spinal pathology in the coming weeks.    PLAN:  Patient will benefit from physical therapy (2-3) x/week for (4-6) weeks including therapeutic exercise, functional activities, neuromuscular re-education, gait training, modalities, and patient education.          "

## 2018-10-09 DIAGNOSIS — R60.0 BILATERAL LEG EDEMA: ICD-10-CM

## 2018-10-09 DIAGNOSIS — Z29.89 PROPHYLACTIC IMMUNOTHERAPY: ICD-10-CM

## 2018-10-09 LAB — TACROLIMUS BLD-MCNC: 6.2 NG/ML

## 2018-10-10 RX ORDER — FUROSEMIDE 40 MG/1
TABLET ORAL
Qty: 30 TABLET | Refills: 0 | Status: SHIPPED | OUTPATIENT
Start: 2018-10-10 | End: 2018-11-07 | Stop reason: SDUPTHER

## 2018-10-11 ENCOUNTER — TELEPHONE (OUTPATIENT)
Dept: TRANSPLANT | Facility: CLINIC | Age: 53
End: 2018-10-11

## 2018-10-11 NOTE — TELEPHONE ENCOUNTER
Reviewed labs with Dr. Souza and the labs are stable.  Notified patient via portal that their labs are stable and to repeat labs on 10/22/18

## 2018-10-11 NOTE — TELEPHONE ENCOUNTER
----- Message from Joselin Souza MD sent at 10/10/2018 10:31 AM CDT -----  Reviewed, nothing to do; repeat per routine

## 2018-10-12 ENCOUNTER — TELEPHONE (OUTPATIENT)
Dept: RADIOLOGY | Facility: HOSPITAL | Age: 53
End: 2018-10-12

## 2018-10-14 NOTE — ASSESSMENT & PLAN NOTE
-- Holding diuretics at present, s/p 2L removed on 11/22 with testing negative for SBP  -- No abdominal pain today    - IR paracentesis on 12/12/17 removed 5.2 liters          left normal/right normal

## 2018-10-15 ENCOUNTER — HOSPITAL ENCOUNTER (OUTPATIENT)
Dept: RADIOLOGY | Facility: HOSPITAL | Age: 53
Discharge: HOME | End: 2018-10-15
Attending: PAIN MEDICINE
Payer: MEDICAID

## 2018-10-15 DIAGNOSIS — M54.16 LUMBAR RADICULOPATHY: ICD-10-CM

## 2018-10-15 PROCEDURE — 72148 MRI LUMBAR SPINE W/O DYE: CPT | Mod: 26,,, | Performed by: RADIOLOGY

## 2018-10-15 PROCEDURE — 72148 MRI LUMBAR SPINE W/O DYE: CPT | Mod: TC,PO

## 2018-10-23 ENCOUNTER — LAB VISIT (OUTPATIENT)
Dept: LAB | Facility: HOSPITAL | Age: 53
End: 2018-10-23
Attending: INTERNAL MEDICINE
Payer: MEDICAID

## 2018-10-23 DIAGNOSIS — Z94.4 LIVER REPLACED BY TRANSPLANT: ICD-10-CM

## 2018-10-23 LAB
ALBUMIN SERPL BCP-MCNC: 3.7 G/DL
ALP SERPL-CCNC: 177 U/L
ALT SERPL W/O P-5'-P-CCNC: 20 U/L
ANION GAP SERPL CALC-SCNC: 10 MMOL/L
AST SERPL-CCNC: 18 U/L
BASOPHILS # BLD AUTO: 0.04 K/UL
BASOPHILS NFR BLD: 0.6 %
BILIRUB SERPL-MCNC: 0.3 MG/DL
BUN SERPL-MCNC: 13 MG/DL
CALCIUM SERPL-MCNC: 10.2 MG/DL
CHLORIDE SERPL-SCNC: 103 MMOL/L
CO2 SERPL-SCNC: 27 MMOL/L
CREAT SERPL-MCNC: 0.8 MG/DL
DIFFERENTIAL METHOD: ABNORMAL
EOSINOPHIL # BLD AUTO: 0.2 K/UL
EOSINOPHIL NFR BLD: 2.8 %
ERYTHROCYTE [DISTWIDTH] IN BLOOD BY AUTOMATED COUNT: 15.2 %
EST. GFR  (AFRICAN AMERICAN): >60 ML/MIN/1.73 M^2
EST. GFR  (NON AFRICAN AMERICAN): >60 ML/MIN/1.73 M^2
GLUCOSE SERPL-MCNC: 94 MG/DL
HCT VFR BLD AUTO: 38 %
HGB BLD-MCNC: 11.9 G/DL
IMM GRANULOCYTES # BLD AUTO: 0.02 K/UL
IMM GRANULOCYTES NFR BLD AUTO: 0.3 %
LYMPHOCYTES # BLD AUTO: 1.3 K/UL
LYMPHOCYTES NFR BLD: 19.3 %
MAGNESIUM SERPL-MCNC: 1.9 MG/DL
MCH RBC QN AUTO: 24.6 PG
MCHC RBC AUTO-ENTMCNC: 31.3 G/DL
MCV RBC AUTO: 79 FL
MONOCYTES # BLD AUTO: 0.5 K/UL
MONOCYTES NFR BLD: 7.4 %
NEUTROPHILS # BLD AUTO: 4.5 K/UL
NEUTROPHILS NFR BLD: 69.6 %
NRBC BLD-RTO: 0 /100 WBC
PLATELET # BLD AUTO: 230 K/UL
PMV BLD AUTO: 10.8 FL
POTASSIUM SERPL-SCNC: 3.9 MMOL/L
PROT SERPL-MCNC: 7.5 G/DL
RBC # BLD AUTO: 4.84 M/UL
SODIUM SERPL-SCNC: 140 MMOL/L
WBC # BLD AUTO: 6.52 K/UL

## 2018-10-23 PROCEDURE — 80053 COMPREHEN METABOLIC PANEL: CPT

## 2018-10-23 PROCEDURE — 83735 ASSAY OF MAGNESIUM: CPT

## 2018-10-23 PROCEDURE — 36415 COLL VENOUS BLD VENIPUNCTURE: CPT | Mod: PO

## 2018-10-23 PROCEDURE — 85025 COMPLETE CBC W/AUTO DIFF WBC: CPT

## 2018-10-23 PROCEDURE — 80197 ASSAY OF TACROLIMUS: CPT

## 2018-10-24 LAB — TACROLIMUS BLD-MCNC: 5.2 NG/ML

## 2018-10-26 ENCOUNTER — CLINICAL SUPPORT (OUTPATIENT)
Dept: REHABILITATION | Facility: HOSPITAL | Age: 53
End: 2018-10-26
Payer: MEDICAID

## 2018-10-26 ENCOUNTER — TELEPHONE (OUTPATIENT)
Dept: TRANSPLANT | Facility: CLINIC | Age: 53
End: 2018-10-26

## 2018-10-26 ENCOUNTER — TELEPHONE (OUTPATIENT)
Dept: GASTROENTEROLOGY | Facility: CLINIC | Age: 53
End: 2018-10-26

## 2018-10-26 DIAGNOSIS — R53.1 WEAKNESS: ICD-10-CM

## 2018-10-26 DIAGNOSIS — M54.32 SCIATICA, LEFT SIDE: ICD-10-CM

## 2018-10-26 DIAGNOSIS — E83.42 HYPOMAGNESEMIA: ICD-10-CM

## 2018-10-26 DIAGNOSIS — R53.81 PHYSICAL DECONDITIONING: Primary | ICD-10-CM

## 2018-10-26 PROCEDURE — 97110 THERAPEUTIC EXERCISES: CPT | Mod: PO

## 2018-10-26 PROCEDURE — 97164 PT RE-EVAL EST PLAN CARE: CPT | Mod: 59,PO

## 2018-10-26 NOTE — PROGRESS NOTES
PHYSICAL THERAPY RE-EVALUATION    Referring Provider:  Devon Robles    Diagnosis:         ICD-10-CM ICD-9-CM    1. Physical deconditioning R53.81 799.3    2. Sciatica, left side M54.32 724.3    3. Weakness R53.1 780.79        Orders:  Evaluate and Treat    Date of Initial Evaluation: 1/16/18    Visit # 20 of 20    BACKGROUND:  Patient returns to PT after 1 month off due to medical issues.  She c/o continued weakness in her L LE and decreased movement in her L shoulder.    Subjective 10/26/18 - Patient without new complaints.    OBJECTIVE:  Updated 10/26    Gait: Pt ambulates with slow pace, decreased step length bilaterally,      ROM:  WNL bilateral LEs,  L shoulder ROM limited by 20% in flex and abduction;  Patient with notable rigidity in bilateral LEs with R>L with possible clonus in R quad        Strength:    Strength Right Left   Hip Flexion 4/5 3/5   Knee Extension 3/5 3+/5   Ankle DF 3/5 3/5   Knee Flexors 43/5 3+/5      Function:  Patient reports 52% impairment on the Lower Extremity Functional Scale (LEFS).     Short Term GOALS: 3 weeks.   1. Pt will walk 150 feet with or without AD independently without need for rest break in order to demonstrate increased independence and endurance for functional mobility around the home  PARTIALLY MET  2. Pt will increase strength to 3+/5 or greater in bilateral LEs.  DECLINE IN FUNCTION  3. Pt will perform 45 min of therex without need for rest break in order to show increased endurance to standing position required for household activities.  NOT MET; DECLINE IN FUNCTION  4. Pt will be I with HEP.  PARTIALLY MET     Long Term GOALS: 8-10  weeks. .  1. Pt will ambulate 300 feet independently without rest break in order to show increased independence and endurance to functional mobility in home and community  NOT MET; DECLINE IN FUNCTION  2. Pt will increase strength to >/= 4/5 MMT grade in BLE in order to perform ADLs without difficulty. NOT MET;  DECLINE  3. Pt  "will perform 20 min of standing therex without need for rest break in order to show increased endurance to standing position required for cooking at home  NOT MET;  DECLINE  3. Pt's goal:  Independent with all ADLs and mobility, be able to cook for family  NOT MET;  DECLINE      TREATMENT PROVIDED:  -Re-evaluation - 15 min  -Therapeutic Exercise:  30 min   - Sciatic n glides - 20x   - Bike - 6'   - TG squats - 4'  Level 4   - L quad str - 30" x 3   - UBE - 3/3     ASSESSMENT:  Patient with significant decline in function as well as noticeable neurological deficits in bilateral LEs including muscle rigidity, clonus, delayed firing, and tremors.  She is also experiencing an overall decline in function.  She would benefit from further neurological testing to determine the origin of her symptoms as well as continued PT to improve her function.    PLAN:  Patient will benefit from physical therapy (2-3) x/week for (4-6) weeks including therapeutic exercise, functional activities, neuromuscular re-education, gait training, modalities, and patient education.          "

## 2018-10-26 NOTE — TELEPHONE ENCOUNTER
Dr. Souza reviewed your labs.  Sent patient message in portal to let her know labs are stable, no changes, please repeat labs on 11/05/18

## 2018-10-26 NOTE — TELEPHONE ENCOUNTER
----- Message from Joselin Souza MD sent at 10/25/2018  5:49 PM CDT -----  Reviewed, nothing to do; repeat per routine

## 2018-10-29 RX ORDER — LANOLIN ALCOHOL/MO/W.PET/CERES
3 CREAM (GRAM) TOPICAL 3 TIMES DAILY
Qty: 270 TABLET | Refills: 1 | Status: SHIPPED | OUTPATIENT
Start: 2018-10-29 | End: 2019-02-22 | Stop reason: SDUPTHER

## 2018-10-29 NOTE — TELEPHONE ENCOUNTER
Magnesium refilled but given the high dose will need to continue to monitor magnesium and try to titrate down.

## 2018-10-30 ENCOUNTER — TELEPHONE (OUTPATIENT)
Dept: GASTROENTEROLOGY | Facility: CLINIC | Age: 53
End: 2018-10-30

## 2018-10-30 NOTE — TELEPHONE ENCOUNTER
Spoke with patient. Informed her Magnesium refilled but  stated the high dose will need to continue to monitor magnesium and try to titrate down, she verbalized understanding, REINALDO.

## 2018-10-30 NOTE — TELEPHONE ENCOUNTER
----- Message from Mely Anguiano sent at 10/29/2018  4:56 PM CDT -----  Contact: pt  She's calling to discuss her Magnesium Rx, please advise [phone] (home)

## 2018-10-31 ENCOUNTER — OFFICE VISIT (OUTPATIENT)
Dept: PAIN MEDICINE | Facility: CLINIC | Age: 53
End: 2018-10-31
Payer: COMMERCIAL

## 2018-10-31 VITALS — HEART RATE: 81 BPM | SYSTOLIC BLOOD PRESSURE: 121 MMHG | DIASTOLIC BLOOD PRESSURE: 74 MMHG | RESPIRATION RATE: 18 BRPM

## 2018-10-31 DIAGNOSIS — R25.1 OCCASIONAL TREMORS: ICD-10-CM

## 2018-10-31 DIAGNOSIS — R29.898 WEAKNESS OF BOTH LOWER EXTREMITIES: Primary | ICD-10-CM

## 2018-10-31 PROCEDURE — 99213 OFFICE O/P EST LOW 20 MIN: CPT | Mod: S$PBB,,, | Performed by: PAIN MEDICINE

## 2018-10-31 PROCEDURE — 99999 PR PBB SHADOW E&M-EST. PATIENT-LVL IV: CPT | Mod: PBBFAC,,, | Performed by: PAIN MEDICINE

## 2018-10-31 PROCEDURE — 99214 OFFICE O/P EST MOD 30 MIN: CPT | Mod: PBBFAC,PO | Performed by: PAIN MEDICINE

## 2018-10-31 NOTE — PROGRESS NOTES
Chief Pain Complaint:  Follow-up (4 wk f/u / mri review/ )      History of Present Illness:   Ms. Bazan returns for followup.  She states that she feels like her symptoms are getting worse and that she has not improved.  She reports having which she believes to be increased weakness in bilateral lower extremities and now she reports having some stool incontinence with minimal leakage.  She has been working with physical therapy and their documentation correlates with with the patient stating that her symptoms seem to be worsening and she has tremors when trying to move lower extremities and upper extremities (please see Zaheer Rojas's note from 10/26/18).  She reports that she has great difficulty walking up steps and is not even really able to perform this task.  Currently she rates her pain as a constant 7/10 which is worse in her bilateral lower extremities. She obtained a lumbar MRI which has shown mild circumferential disc bulging at L3-4 and L4-5 in addition to facet arthropathy and mild bilateral neural foraminal narrowing at L3-4 and L4-5.  She describes a burning sensation in her right lateral thigh.  Activity causes her symptoms to worsen why she has not really found any activities that benefit her current symptoms.  She is taking gabapentin 900 mg 3 times daily.  We have been avoiding anti-inflammatory medication since this patient is a liver transplant patient.    Initial HPI:  This patient is a 53 y.o. female who presents today complaining of the above noted pain/s. The patient describes the pain as follows.  Ms. Bazan has a history of hypertension, liver transplant, lumbar spondylosis who presents to clinic with complaints of right-sided cervical pain and lumbar pain which radiates into bilateral legs.  She has been having these symptoms since December 2017.  Currently she rates her pain as a 9/10 and describes the low back pain radiating leg pain as constant burning while the neck pain is described as  a shocking type of pain and radiates from the low cervical spine superiorly.  The radiating pain down right leg does not go into the foot and travels in the lateral aspect of the leg and crosses medially across the knee in the L4 distribution.  She endorses bilateral lower extremity weakness.  Denies radiation of cervical pain into the arms.  She is currently working with physical therapy and has been since she underwent liver transplant in December 2017.  She denies bowel or bladder changes.    Previous Therapy:  Medications:  Gabapentin  Injections:  None  Surgeries:  None  Physical Therapy:  Has been participating since December 2017    Past Surgical History:   Procedure Laterality Date    BREAST BIOPSY Left     benign    CHOLECYSTECTOMY      COLONOSCOPY N/A 12/1/2017    Procedure: COLONOSCOPY;  Surgeon: Filemon Fuentes MD;  Location: [facility] ENDO (2ND FLR);  Service: Endoscopy;  Laterality: N/A;    COLONOSCOPY N/A 12/5/2017    Procedure: COLONOSCOPY;  Surgeon: José Chavira MD;  Location: [facility] ENDO (2ND FLR);  Service: Endoscopy;  Laterality: N/A;    COLONOSCOPY N/A 12/5/2017    Performed by José Chavira MD at [facility] ENDO (2ND FLR)    COLONOSCOPY N/A 12/1/2017    Performed by Filemon Fuentes MD at [facility] ENDO (2ND FLR)    ESOPHAGOGASTRODUODENOSCOPY (EGD) N/A 12/1/2017    Performed by Filemon Fuentes MD at [facility] ENDO (2ND FLR)    HYSTERECTOMY      complete     LIVER TRANSPLANT  12/2017    TRANSPLANT-LIVER N/A 12/26/2017    Performed by Romeo Moore MD at [facility] OR 2ND FLR     Imaging / Labs / Studies (reviewed on 10/31/2018):  Results for orders placed during the hospital encounter of 09/15/15   CT Lumbar Spine Without Contrast    Narrative CT of lumbar spine    History: Back pain    Technique: Standard lumbar spine CT protocol was performed without IV contrast.    Finding: Vertebral body heights and alignment are within normal limits.  Mild narrowing at L5-S1 intervertebral disc space with mild central  disc bulge.  There is a moderate circumferential bulge at the L4/L5 level effacing the thecal sac.  Remaining   intervertebral discs are within normal limits.  Prevertebral soft tissues are normal.  Visualized abdominal organs are unremarkable.    Impression      Mild degenerative change with disc bulges at L4-L5 and L5-S1.   Lumbar MRI 10/15/18:  Narrative     EXAMINATION:  MRI LUMBAR SPINE WITHOUT CONTRAST    CLINICAL HISTORY:  Low back pain, >6wks conservative tx, persistent-progressive sx, surgical candidate; Radiculopathy, lumbar region    TECHNIQUE:  Multiplanar, multisequence MR images were acquired from the thoracolumbar junction to the sacrum without the administration of contrast.    COMPARISON:  None.    FINDINGS:  Vertebral body heights and alignment are maintained.  No concerning marrow signal abnormality.  L4 vertebral body hemangioma present.  There is mild disc height loss at L5-S1.  Conus terminates normally.    Intra-abdominal/pelvic visualized structures are unremarkable.    L1-L2: No spinal canal stenosis or neural foraminal narrowing.    L2-L3: No spinal canal stenosis or neural foraminal narrowing.    L3-L4: Mild circumferential disc bulge present.  Facet/ligamentum flavum hypertrophy noted.  Mild bilateral inferior neural foraminal narrowing present.  Mild central spinal canal stenosis present.    L4-L5: Mild circumferential disc bulging present.  Facet ligamentum flavum hypertrophy noted.  Mild spinal canal stenosis with mild left greater than right inferior neural foraminal narrowing.    L5-S1: No significant posterior disc bulge or spinal canal stenosis.  Facet degenerative hypertrophy present with mild left-sided neural foraminal narrowing.      Impression       Mild degenerative changes as above without high-grade spinal canal stenosis or neural foraminal narrowing.     C spine Xray 10/3/18:  Narrative     EXAMINATION:  XR CERVICAL SPINE 5 VIEW WITH FLEX AND EXT    CLINICAL HISTORY:  neck  pain;  Radiculopathy, lumbar region    TECHNIQUE:  Five views of the cervical spine plus flexion and extension views were performed.    COMPARISON:  None    FINDINGS:  There is some straightening of the normal cervical lordosis.  Minimal retrolisthesis of C5 on C6 noted.  Vertebral body heights are within normal limits.  No change in spinal alignment with flexion or extension to suggest instability.  There is mild disc height loss at C5-6 and C6-7 with associated degenerative endplate spurring.  Posterior elements appear intact without acute fractures or subluxations demonstrated.  Odontoid process appears intact.  Atlantoaxial articulations appear normal.  Prevertebral soft tissues are within normal limits.      Impression       Degenerative changes as above.  No acute findings.     Review of Systems:  CONSTITUTIONAL: patient denies any fever, chills, or weight loss  SKIN: patient denies any rash or itching  RESPIRATORY: patient denies having any shortness of breath  GASTROINTESTINAL: patient reports having stool incontinence with some leakage  GENITOURINARY: patient denies having any abnormal bladder function    MUSCULOSKELETAL:  - patient complains of the above noted pain/s (see chief pain complaint)    NEUROLOGICAL:   - pain as above  - strength in Lower extremities is decreased, BILATERALLY  - sensation in Lower extremities is intact, BILATERALLY  - patient reports having stool incontinence     PSYCHIATRIC: patient denies any change in mood    Other:  All other systems reviewed and are negative      Physical Exam:  /74   Pulse 81   Resp 18   LMP 01/01/1985 (Approximate) Comment: 1985 (reviewed on 10/31/2018)\  General:  Alert and oriented, No acute distress.    HEENT:       EOMI.  Normocephalic, atraumatic.   Cardiovascular:  Regular rate.    Gastrointestinal:  Soft.    Respiratory:  Respirations are non-labored.    Cervical Spine:  No masses or atrophy,    Range of motion -limited Flexion, limited  Extension,  limited Right Rotation, limited Left Rotation, Right Lat Bending,  Left Lat Bending         Increased pain with  flexion, extension, lateral rotation to the left and right        Palpation - tender to palpation midline and lower cervical spine and over right cervical facets        Spurling's - negative for radicular symptoms  Thoracic Spine:  Palpation normal.    Lumbar Spine:  No masses or atrophy,         Range of motion - normal Flexion, Extension,  Right Lat Bending,  Left Lat Bending        Palpation - tender palpation over lumbar facets specifically L4-5 and L5-S1        PSIS tenderness - negative bilaterally             SLR - negative for radicular symptoms  Motor Exam:    Strength:  Rate on 1-5 scale Right Left   L2- hip flexion  3+ 3+   L3- knee extension  3+ 3+   L4- ankle dorsiflexion 5 5   L5- great toe extension 5 5   S1- ankle plantarflexion 5 5     Sensory Exam:  Full and equal sensation to light touch throughout.    Reflexes   Left DTR's      Knee.   3  Ankle.   1  Right DTR's    Knee.   3  Ankle.   1  Neurologic:  Cranial Nerves II-XII are grossly intact.    Pathologic reflexes:            Clonus - Neg   Psychiatric:  Cooperative.    Gait:  Slow, antalgic  Heel/Toe Walk:  Very slowly and with great difficulty using support bilaterally  Has great difficulty standing on tiptoes and on heels, poor balance  Romberg-negative  Assessment  Lumbar Radiculopathy  Cervical Spondylosis   Lumbar spondylosis    1. 53 y.o. year old patient with PMH of   Past Medical History:   Diagnosis Date    Alcohol abuse     Alcoholic hepatitis     Anemia of chronic disease     Coagulopathy     Encounter for blood transfusion     End stage liver disease     Hypertension     Hyponatremia     Liver cirrhosis     Liver transplant candidate 12/26/2017    Obesity (BMI 30-39.9) 1/5/2016      presenting with pain located in cervical and lumbar spine, bilateral lower extremities  2. Pain Generators / Etiology :   Cervical spondylosis, lumbar spondylosis, lumbar radiculopathy  3. Failed Meds (E- Effective, NE- Not Effective):  Gabapentin-E  4. Physical Therapy - has been participating since December 2017  5. Psychological comorbidities -  none  6. Anticoagulants / Antiplatelets:  None     PLAN:  1. Medications continue gabapentin and increased to 900 mg 3 times daily  2. PT - continue participating in physical therapy and performing exercises at home; I have concerned that there is something else going on neurological E with this patient due to the tremors and increasing weakness despite participating in physical therapy in addition to stool incontinence which is new  3. Psychological - none  4. Labs - obtain  none; review labs from 09/24/2018, creatinine 0.9  5. Imaging - reviewed lumbar MRI and cervical x-ray with patient today in clinic  6. Interventions - schedule none; consider epidural steroid injections in the future  7. Referrals - provide a referral to Neurology and Neurosurgery for evaluation of increasing weakness and tremors in addition to stool incontinence  8. Records - none  9. Follow up visit - follow up in clinic in 6 weeks  10. Patient Questions - answered all the patient's questions regarding diagnosis, therapy, treatment    KATJA Desai MD  Interventional Pain  [facility] [place]

## 2018-10-31 NOTE — PATIENT INSTRUCTIONS
- continue working with physical therapy  - continue gabapentin 900mg TID  - provided referral to neurology for tremors and weakness  - provided referral to neurosurgery for tremors and weakness in addition to bowel leakage  - follow up in 6 weeks

## 2018-11-02 ENCOUNTER — INITIAL CONSULT (OUTPATIENT)
Dept: NEUROSURGERY | Facility: CLINIC | Age: 53
End: 2018-11-02
Payer: MEDICAID

## 2018-11-02 VITALS
HEIGHT: 63 IN | HEART RATE: 92 BPM | WEIGHT: 183.88 LBS | DIASTOLIC BLOOD PRESSURE: 85 MMHG | SYSTOLIC BLOOD PRESSURE: 141 MMHG | BODY MASS INDEX: 32.58 KG/M2

## 2018-11-02 DIAGNOSIS — M51.36 DEGENERATIVE DISC DISEASE, LUMBAR: Primary | ICD-10-CM

## 2018-11-02 PROCEDURE — 99999 PR PBB SHADOW E&M-EST. PATIENT-LVL III: CPT | Mod: PBBFAC,,, | Performed by: NEUROLOGICAL SURGERY

## 2018-11-02 PROCEDURE — 99203 OFFICE O/P NEW LOW 30 MIN: CPT | Mod: S$PBB,,, | Performed by: NEUROLOGICAL SURGERY

## 2018-11-02 PROCEDURE — 99213 OFFICE O/P EST LOW 20 MIN: CPT | Mod: PBBFAC,PO | Performed by: NEUROLOGICAL SURGERY

## 2018-11-02 RX ORDER — GABAPENTIN 600 MG/1
600 TABLET ORAL 3 TIMES DAILY
COMMUNITY
End: 2019-02-27 | Stop reason: SDUPTHER

## 2018-11-02 NOTE — LETTER
November 7, 2018      David Desai MD  [address] [place] [address] Dr Veronica [address]ER [id number]           [facility]  [address] [place] [address] Dr Veronica GRANGER [id number]  Phone: [phone]  Fax: [phone]          Patient: Marcie Bazan   MR Number: [id number]   YOB: 1965   Date of Visit: 11/2/2018       Dear Dr. David Desai:    Thank you for referring Marcie Bazan to me for evaluation. Attached you will find relevant portions of my assessment and plan of care.    If you have questions, please do not hesitate to call me. I look forward to following Marcie Bazan along with you.    Sincerely,    Bree Sarmiento  CC:  No Recipients    If you would like to receive this communication electronically, please contact [email or web address] or [phone] to request more information on [facility] access.    For providers and/or their staff who would like to refer a patient to [facility], please contact us through our one-stop-shop provider referral line, Kofi Crump, at [phone].    If you feel you have received this communication in error or would no longer like to receive these types of communications, please e-mail [email or web address]

## 2018-11-05 ENCOUNTER — TELEPHONE (OUTPATIENT)
Dept: NEUROSURGERY | Facility: CLINIC | Age: 53
End: 2018-11-05

## 2018-11-05 ENCOUNTER — LAB VISIT (OUTPATIENT)
Dept: LAB | Facility: HOSPITAL | Age: 53
End: 2018-11-05
Attending: INTERNAL MEDICINE
Payer: MEDICAID

## 2018-11-05 DIAGNOSIS — Z94.4 LIVER REPLACED BY TRANSPLANT: ICD-10-CM

## 2018-11-05 LAB
ALBUMIN SERPL BCP-MCNC: 3.6 G/DL
ALP SERPL-CCNC: 184 U/L
ALT SERPL W/O P-5'-P-CCNC: 17 U/L
ANION GAP SERPL CALC-SCNC: 9 MMOL/L
AST SERPL-CCNC: 17 U/L
BASOPHILS # BLD AUTO: 0.03 K/UL
BASOPHILS NFR BLD: 0.4 %
BILIRUB SERPL-MCNC: 0.3 MG/DL
BUN SERPL-MCNC: 10 MG/DL
CALCIUM SERPL-MCNC: 10 MG/DL
CHLORIDE SERPL-SCNC: 105 MMOL/L
CO2 SERPL-SCNC: 28 MMOL/L
CREAT SERPL-MCNC: 0.7 MG/DL
DIFFERENTIAL METHOD: ABNORMAL
EOSINOPHIL # BLD AUTO: 0.2 K/UL
EOSINOPHIL NFR BLD: 3.1 %
ERYTHROCYTE [DISTWIDTH] IN BLOOD BY AUTOMATED COUNT: 14.6 %
EST. GFR  (AFRICAN AMERICAN): >60 ML/MIN/1.73 M^2
EST. GFR  (NON AFRICAN AMERICAN): >60 ML/MIN/1.73 M^2
GLUCOSE SERPL-MCNC: 91 MG/DL
HCT VFR BLD AUTO: 36.8 %
HGB BLD-MCNC: 11.6 G/DL
IMM GRANULOCYTES # BLD AUTO: 0.04 K/UL
IMM GRANULOCYTES NFR BLD AUTO: 0.5 %
LYMPHOCYTES # BLD AUTO: 1.5 K/UL
LYMPHOCYTES NFR BLD: 19.4 %
MAGNESIUM SERPL-MCNC: 1.8 MG/DL
MCH RBC QN AUTO: 24.5 PG
MCHC RBC AUTO-ENTMCNC: 31.5 G/DL
MCV RBC AUTO: 78 FL
MONOCYTES # BLD AUTO: 0.5 K/UL
MONOCYTES NFR BLD: 6.3 %
NEUTROPHILS # BLD AUTO: 5.3 K/UL
NEUTROPHILS NFR BLD: 70.3 %
NRBC BLD-RTO: 0 /100 WBC
PLATELET # BLD AUTO: 58 K/UL
PMV BLD AUTO: 11.8 FL
POTASSIUM SERPL-SCNC: 3.6 MMOL/L
PROT SERPL-MCNC: 6.9 G/DL
RBC # BLD AUTO: 4.73 M/UL
SODIUM SERPL-SCNC: 142 MMOL/L
WBC # BLD AUTO: 7.51 K/UL

## 2018-11-05 PROCEDURE — 36415 COLL VENOUS BLD VENIPUNCTURE: CPT | Mod: PO

## 2018-11-05 PROCEDURE — 80053 COMPREHEN METABOLIC PANEL: CPT

## 2018-11-05 PROCEDURE — 83735 ASSAY OF MAGNESIUM: CPT

## 2018-11-05 PROCEDURE — 85025 COMPLETE CBC W/AUTO DIFF WBC: CPT

## 2018-11-05 PROCEDURE — 80197 ASSAY OF TACROLIMUS: CPT

## 2018-11-05 NOTE — TELEPHONE ENCOUNTER
Pt returned call and states her pharmacy does not have any medication from Dr. Dalton. Pt states Dr. Dalton told her to d/c Flexeril and he would order something else. I informed pt that Dr. Dalton is in surgery today and I would talk to him first thing in the morning and give her a call back. Pt verbally understood. 4:23pm      LM for pt to return call. 1:43pm  ----- Message from Char Wakefield sent at 11/5/2018  1:34 PM CST -----  Contact: self [phone]  Would like to follow-up with nurse regarding change to Flexeril medication.  Please call back at [phone].  Thx

## 2018-11-06 ENCOUNTER — CLINICAL SUPPORT (OUTPATIENT)
Dept: REHABILITATION | Facility: HOSPITAL | Age: 53
End: 2018-11-06
Payer: MEDICAID

## 2018-11-06 DIAGNOSIS — R53.1 WEAKNESS: ICD-10-CM

## 2018-11-06 DIAGNOSIS — M54.32 SCIATICA, LEFT SIDE: ICD-10-CM

## 2018-11-06 DIAGNOSIS — R53.81 PHYSICAL DECONDITIONING: Primary | ICD-10-CM

## 2018-11-06 LAB — TACROLIMUS BLD-MCNC: 4.9 NG/ML

## 2018-11-06 PROCEDURE — 97110 THERAPEUTIC EXERCISES: CPT | Mod: PO

## 2018-11-06 RX ORDER — METHOCARBAMOL 750 MG/1
750 TABLET, FILM COATED ORAL 3 TIMES DAILY PRN
Qty: 60 TABLET | Refills: 0 | Status: SHIPPED | OUTPATIENT
Start: 2018-11-06 | End: 2019-01-30 | Stop reason: SDUPTHER

## 2018-11-06 NOTE — PROGRESS NOTES
"PHYSICAL THERAPY RE-EVALUATION    Referring Provider:  Devon Robles    Diagnosis:         ICD-10-CM ICD-9-CM    1. Physical deconditioning R53.81 799.3    2. Sciatica, left side M54.32 724.3    3. Weakness R53.1 780.79        Orders:  Evaluate and Treat    Date of Initial Evaluation: 1/16/18    Visit # 1    BACKGROUND:  Patient returns to PT after 1 month off due to medical issues.  She c/o continued weakness in her L LE and decreased movement in her L shoulder.    Subjective 11/6/18 - Patient reports having some soreness in her R knee and increased weakness in her L LE.  She states that she is scheduled to see a neurosurgeon next week.    OBJECTIVE:  Updated 10/26    TREATMENT PROVIDED:  -Therapeutic Exercise:  30 min   - Sciatic n glides - 20x   - Bike - 5'   - Bridging - 30x   - HIp abd/add - 30x ea   - L quad str - 30" x 3   - UBE - 5 min FW     ASSESSMENT:  Patient's overall functioning is regressing with decreased strength and mobility.    PLAN:  Patient will benefit from physical therapy (2-3) x/week for (4-6) weeks including therapeutic exercise, functional activities, neuromuscular re-education, gait training, modalities, and patient education.          "

## 2018-11-07 ENCOUNTER — TELEPHONE (OUTPATIENT)
Dept: TRANSPLANT | Facility: CLINIC | Age: 53
End: 2018-11-07

## 2018-11-07 DIAGNOSIS — Z29.89 PROPHYLACTIC IMMUNOTHERAPY: ICD-10-CM

## 2018-11-07 DIAGNOSIS — R60.0 BILATERAL LEG EDEMA: ICD-10-CM

## 2018-11-07 NOTE — TELEPHONE ENCOUNTER
----- Message from Joselin Souza MD sent at 11/7/2018  1:16 PM CST -----  Platelet count seems to be to be bouncing around other labs stable repeat per routine

## 2018-11-07 NOTE — TELEPHONE ENCOUNTER
Dr. Souza reviewed your labs.  Sent patient message to let her know labs stable, no changes and to repeat labs onn 11/26/18.

## 2018-11-08 RX ORDER — FUROSEMIDE 40 MG/1
TABLET ORAL
Qty: 30 TABLET | Refills: 2 | Status: SHIPPED | OUTPATIENT
Start: 2018-11-08 | End: 2019-02-13 | Stop reason: SDUPTHER

## 2018-11-09 NOTE — ED TRIAGE NOTES
"Pt presents to the ED c/o abnormal labs. Pt states she was in the clinic and the doctor told her to come to the ED r/t low potassium, increased WBC, and "fluid on her stomach." Hx cirrhosis. +weakness, fatigue, nausea, decreased appetite, SOB. Pt's daughter reports the pt has had weight loss recently, although the amount of weight is uncertain.  " 42 y/o F w/ PMH Anxiety, and Substance abuse, presents to ED for methadone. Pt was seen here yesterday for the same reason. Pt states she will be going to a methadone clinic on Monday. Denies any other acute complaints. NKDA.

## 2018-11-12 ENCOUNTER — CLINICAL SUPPORT (OUTPATIENT)
Dept: REHABILITATION | Facility: HOSPITAL | Age: 53
End: 2018-11-12
Payer: MEDICAID

## 2018-11-12 DIAGNOSIS — R53.1 WEAKNESS: ICD-10-CM

## 2018-11-12 DIAGNOSIS — M54.32 SCIATICA, LEFT SIDE: ICD-10-CM

## 2018-11-12 DIAGNOSIS — R53.81 PHYSICAL DECONDITIONING: Primary | ICD-10-CM

## 2018-11-12 PROCEDURE — 97110 THERAPEUTIC EXERCISES: CPT | Mod: PO

## 2018-11-12 NOTE — PROGRESS NOTES
"PHYSICAL THERAPY RE-EVALUATION    Referring Provider:  Devon Robles    Diagnosis:         ICD-10-CM ICD-9-CM    1. Physical deconditioning R53.81 799.3    2. Sciatica, left side M54.32 724.3    3. Weakness R53.1 780.79        Orders:  Evaluate and Treat    Date of Initial Evaluation: 1/16/18    Visit # 2    BACKGROUND:  Patient returns to PT after 1 month off due to medical issues.  She c/o continued weakness in her L LE and decreased movement in her L shoulder.    Subjective 11/12/18 - Patient reports feeling good today and having more energy.    OBJECTIVE:  Updated 10/26    TREATMENT PROVIDED:  -Therapeutic Exercise:  40 min   - Sciatic n glides - 40x   - Bike - 5'   - Bridging - 50x   - HIp abd/add - 50x ea   - L quad str - 30" x 3   - UBE - 5 min FW   - SL leg press - 40x  L4  -Modalities - MH to low back and L hip - 10 min     ASSESSMENT:  Patient tolerated treatment well.  Patient with increased overall endurance and improved strength demonstrated with activities today.    PLAN:  Patient will benefit from physical therapy (2-3) x/week for (4-6) weeks including therapeutic exercise, functional activities, neuromuscular re-education, gait training, modalities, and patient education.          "

## 2018-11-15 NOTE — PROGRESS NOTES
Subjective:      Patient ID: Marcie Bazan is a 53 y.o. female.    Chief Complaint: Extremity Weakness    Patient presents for evaluation of lower back pain going into the lower extremity.  This has been going on for several years now getting worse in severity.  Initially the pain was isolated to her middle of her back.  She then notices started going down into her feet with radiation into the toes the right side being worse than the left.  She was having difficulty with ambulation secondary to pain.  She was taking Neurontin to alleviate some of the symptoms.  Ambulates unassisted.  No bowel or bladder symptoms.  No other associated symptoms at this time.         Review of Systems   Constitutional: Negative for activity change, appetite change and chills.   HENT: Negative for hearing loss, sore throat and tinnitus.    Eyes: Negative for pain, discharge and itching.   Cardiovascular: Negative for chest pain.   Gastrointestinal: Negative for abdominal pain.   Endocrine: Negative for cold intolerance and heat intolerance.   Genitourinary: Negative for difficulty urinating and dysuria.   Musculoskeletal: Positive for back pain and gait problem.   Allergic/Immunologic: Negative for environmental allergies.   Neurological: Positive for weakness. Negative for dizziness, tremors, light-headedness and headaches.   Hematological: Negative for adenopathy.   Psychiatric/Behavioral: Negative for agitation, behavioral problems and confusion.         Objective:       Physical Exam:  Nursing note and vitals reviewed.    Constitutional: She appears well-developed and well-nourished. No distress.     Eyes: Pupils are equal, round, and reactive to light. EOM are normal.     Cardiovascular: Intact distal pulses.     Abdominal: Soft.     Psych/Behavior: She is alert. She is oriented to person, place, and time. She has a normal mood and affect.     Neurological:        DTRs: Tricep reflexes are 2+ on the right side and 2+ on the left  side. Bicep reflexes are 2+ on the right side and 2+ on the left side. Brachioradialis reflexes are 2+ on the right side and 2+ on the left side. Achilles reflexes are 2+ on the right side and 2+ on the left side.     General    Nursing note and vitals reviewed.  Constitutional: She is oriented to person, place, and time. She appears well-developed and well-nourished. No distress.   HENT:   Head: Normocephalic and atraumatic.   Nose: Nose normal.   Eyes: EOM are normal. Pupils are equal, round, and reactive to light.   Neck: Normal range of motion. Neck supple.   Cardiovascular: Intact distal pulses.    Pulmonary/Chest: Effort normal. No respiratory distress.   Abdominal: Soft. She exhibits no distension.   Neurological: She is alert and oriented to person, place, and time. No cranial nerve deficit.   Psychiatric: She has a normal mood and affect. Her behavior is normal.         Right Ankle/Foot Exam     Tests   Heel Walk: unable to perform  Tiptoe Walk: unable to perform  Single Heel Rise: able to perform  Double Heel Rise: unable to perform double heel rise    Left Ankle/Foot Exam     Tests   Heel Walk: unable to perform  Tiptoe Walk: unable to perform  Single Heel Rise: able to perform  Double Heel Rise: able to perform  Back (L-Spine & T-Spine) / Neck (C-Spine) Exam     Tenderness Posterior midline palpation reveals tenderness of the Upper L-Spine and Lower L-Spine. Right paramedian tenderness of the Lower L-Spine, Sacrum and Upper L-Spine. Left paramedian tenderness of the Lower L-Spine, Sacrum and Upper L-Spine.     Back (L-Spine & T-Spine) Range of Motion   Lateral bend right: abnormal   Lateral bend left: abnormal     Neck (C-Spine) Range of Motion   Flexion:     Normal  Extension: Normal  Right Lateral Bend: normal  Left Lateral Bend: normal  Right Rotation: normal  Left Rotation: normal    Spinal Sensation   Right Side Sensation  C-Spine Level: normal   L-Spine Level: normal  Left Side Sensation  C-Spine  Level: normal  L-Spine Level: normal    Back (L-Spine & T-Spine) Tests   Right Side Tests  Squat Test: unable to perform  Left Side Tests  Squat: unable to perform    Neck (C-Spine) Tests   Spurling's Test   Left:  Negative  Right: negative      Muscle Strength   Right Upper Extremity   Biceps: /5   Deltoid:  5/5  Triceps:  5/5  Wrist extension: /   Wrist flexion:    Finger Flexors:  55  Finger Extensors:  5/5  Left Upper Extremity  Biceps:    Deltoid:  5  Triceps:    Wrist extension:    Wrist flexion:    Finger Flexors:    Finger Extensors:    Right Lower Extremity   Hip Abduction:    Hip Flexion:    Hip Extensors:   Quadriceps:     Hamstrin/5   Anterior tibial:    Gastrocsoleus:    EHL:    Left Lower Extremity   Hip Abduction:    Hip Flexion:    Hip Extensors:   Quadriceps:     Hamstrin/5   Anterior tibial:     Gastrocsoleus:    EHL:  /    Reflexes     Left Side  Biceps:  2+  Triceps:  2+  Brachioradialis:  2+  Quadriceps:  2+  Post Tibial:  2+  Achilles:  2+  Left Mcgee's Sign:  Absent    Right Side   Biceps:  2+  Triceps:  2+  Brachioradialis:  2+  Quadriceps:  2+  Post Tibial:  2+  Achilles:  2+  Right Mcgee's Sign:  absent    MRI shows mild degenerative changes to the lumbar spine however no central stenosis facet disease and foraminal stenosis at identified to may be explained some of the symptom  Assessment:     1. Degenerative disc disease, lumbar      Plan:     Degenerative disc disease, lumbar  -     methocarbamol (ROBAXIN) 750 MG Tab; Take 1 tablet (750 mg total) by mouth 3 (three) times daily as needed (muscle spasms).  Dispense: 60 tablet; Refill: 0     She has severe muscle spasms bilaterally to the lower spine  She has lumbar degenerative changes however no surgical anatomy is identified on the MRI of the lumbar spine   Will make her return appointment on a p.r.n. Basis  Please call if her symptoms  change her worsen in any way    Thank you for the referral   Please call with any questions    Ankur Dalton MD  Neurosurgery

## 2018-11-26 ENCOUNTER — LAB VISIT (OUTPATIENT)
Dept: LAB | Facility: HOSPITAL | Age: 53
End: 2018-11-26
Attending: INTERNAL MEDICINE
Payer: MEDICAID

## 2018-11-26 DIAGNOSIS — Z94.4 LIVER REPLACED BY TRANSPLANT: ICD-10-CM

## 2018-11-26 LAB
ALBUMIN SERPL BCP-MCNC: 3.5 G/DL
ALP SERPL-CCNC: 180 U/L
ALT SERPL W/O P-5'-P-CCNC: 17 U/L
ANION GAP SERPL CALC-SCNC: 9 MMOL/L
AST SERPL-CCNC: 17 U/L
BASOPHILS # BLD AUTO: 0.04 K/UL
BASOPHILS NFR BLD: 0.6 %
BILIRUB SERPL-MCNC: 0.3 MG/DL
BUN SERPL-MCNC: 15 MG/DL
CALCIUM SERPL-MCNC: 9.6 MG/DL
CHLORIDE SERPL-SCNC: 105 MMOL/L
CO2 SERPL-SCNC: 27 MMOL/L
CREAT SERPL-MCNC: 0.8 MG/DL
DIFFERENTIAL METHOD: ABNORMAL
EOSINOPHIL # BLD AUTO: 0.2 K/UL
EOSINOPHIL NFR BLD: 3.4 %
ERYTHROCYTE [DISTWIDTH] IN BLOOD BY AUTOMATED COUNT: 14 %
EST. GFR  (AFRICAN AMERICAN): >60 ML/MIN/1.73 M^2
EST. GFR  (NON AFRICAN AMERICAN): >60 ML/MIN/1.73 M^2
GLUCOSE SERPL-MCNC: 86 MG/DL
HCT VFR BLD AUTO: 36.6 %
HGB BLD-MCNC: 11.1 G/DL
IMM GRANULOCYTES # BLD AUTO: 0.02 K/UL
IMM GRANULOCYTES NFR BLD AUTO: 0.3 %
LYMPHOCYTES # BLD AUTO: 1.3 K/UL
LYMPHOCYTES NFR BLD: 19.4 %
MAGNESIUM SERPL-MCNC: 1.6 MG/DL
MCH RBC QN AUTO: 23.6 PG
MCHC RBC AUTO-ENTMCNC: 30.3 G/DL
MCV RBC AUTO: 78 FL
MONOCYTES # BLD AUTO: 0.6 K/UL
MONOCYTES NFR BLD: 8.5 %
NEUTROPHILS # BLD AUTO: 4.6 K/UL
NEUTROPHILS NFR BLD: 67.8 %
NRBC BLD-RTO: 0 /100 WBC
PLATELET # BLD AUTO: 65 K/UL
PMV BLD AUTO: 12.8 FL
POTASSIUM SERPL-SCNC: 3.5 MMOL/L
PROT SERPL-MCNC: 7 G/DL
RBC # BLD AUTO: 4.71 M/UL
SODIUM SERPL-SCNC: 141 MMOL/L
WBC # BLD AUTO: 6.82 K/UL

## 2018-11-26 PROCEDURE — 80197 ASSAY OF TACROLIMUS: CPT

## 2018-11-26 PROCEDURE — 85025 COMPLETE CBC W/AUTO DIFF WBC: CPT

## 2018-11-26 PROCEDURE — 36415 COLL VENOUS BLD VENIPUNCTURE: CPT | Mod: PO

## 2018-11-26 PROCEDURE — 83735 ASSAY OF MAGNESIUM: CPT

## 2018-11-26 PROCEDURE — 80053 COMPREHEN METABOLIC PANEL: CPT

## 2018-11-27 LAB — TACROLIMUS BLD-MCNC: 4.8 NG/ML

## 2018-11-28 ENCOUNTER — OFFICE VISIT (OUTPATIENT)
Dept: PAIN MEDICINE | Facility: CLINIC | Age: 53
End: 2018-11-28
Payer: MEDICAID

## 2018-11-28 ENCOUNTER — HOSPITAL ENCOUNTER (OUTPATIENT)
Dept: RADIOLOGY | Facility: HOSPITAL | Age: 53
Discharge: HOME | End: 2018-11-28
Attending: PAIN MEDICINE
Payer: MEDICAID

## 2018-11-28 VITALS
DIASTOLIC BLOOD PRESSURE: 81 MMHG | SYSTOLIC BLOOD PRESSURE: 134 MMHG | WEIGHT: 183.88 LBS | HEART RATE: 72 BPM | BODY MASS INDEX: 32.57 KG/M2

## 2018-11-28 DIAGNOSIS — M79.645 PAIN OF LEFT THUMB: ICD-10-CM

## 2018-11-28 DIAGNOSIS — M79.642 LEFT HAND PAIN: ICD-10-CM

## 2018-11-28 DIAGNOSIS — M79.642 LEFT HAND PAIN: Primary | ICD-10-CM

## 2018-11-28 PROCEDURE — 99213 OFFICE O/P EST LOW 20 MIN: CPT | Mod: PBBFAC,25,PO | Performed by: PAIN MEDICINE

## 2018-11-28 PROCEDURE — 73130 X-RAY EXAM OF HAND: CPT | Mod: TC,FY,PO,LT

## 2018-11-28 PROCEDURE — 99213 OFFICE O/P EST LOW 20 MIN: CPT | Mod: S$PBB,,, | Performed by: PAIN MEDICINE

## 2018-11-28 PROCEDURE — 73130 X-RAY EXAM OF HAND: CPT | Mod: 26,LT,, | Performed by: RADIOLOGY

## 2018-11-28 PROCEDURE — 99999 PR PBB SHADOW E&M-EST. PATIENT-LVL III: CPT | Mod: PBBFAC,,, | Performed by: PAIN MEDICINE

## 2018-11-28 NOTE — PATIENT INSTRUCTIONS
Continue gabapentin 900 mg 3 times daily  -continue Robaxin as prescribed  -continue physical therapy  -follow-up with neurology in January  -provide a referral for orthopedics today  -ordered a left hand x-ray  -follow up in clinic in 6 weeks

## 2018-11-28 NOTE — PROGRESS NOTES
Chief Pain Complaint:  Hand Pain (left thumb )      History of Present Illness:   Ms. Bazan returns for followup.  She feels that her symptoms have greatly improved since her last visit.  Today she has complaints of left thumb pain.  Since her last visit she is taking 900 mg of gabapentin 3 times daily, she is taking Robaxin and has been participating in physical therapy.  She reports having left thumb pain for approximately last 1-2 months with no inciting event.  She describes her pain currently as an 8/10 which is constant and sharp in nature.  She does report having some weakness and numbness in the left thumb but no other fingers of the left hand.  She is able to move the thumb however this causes extreme pain for her.  She has never felt anything like this before.    Initial HPI:  This patient is a 53 y.o. female who presents today complaining of the above noted pain/s. The patient describes the pain as follows.  Ms. Bazan has a history of hypertension, liver transplant, lumbar spondylosis who presents to clinic with complaints of right-sided cervical pain and lumbar pain which radiates into bilateral legs.  She has been having these symptoms since December 2017.  Currently she rates her pain as a 9/10 and describes the low back pain radiating leg pain as constant burning while the neck pain is described as a shocking type of pain and radiates from the low cervical spine superiorly.  The radiating pain down right leg does not go into the foot and travels in the lateral aspect of the leg and crosses medially across the knee in the L4 distribution.  She endorses bilateral lower extremity weakness.  Denies radiation of cervical pain into the arms.  She is currently working with physical therapy and has been since she underwent liver transplant in December 2017.  She denies bowel or bladder changes.    Previous Therapy:  Medications:  Gabapentin, Robaxin  Injections:  None  Surgeries:  None  Physical Therapy:  Has  been participating since December 2017    Past Surgical History:   Procedure Laterality Date    BREAST BIOPSY Left     benign    CHOLECYSTECTOMY      COLONOSCOPY N/A 12/1/2017    Procedure: COLONOSCOPY;  Surgeon: Filemon Fuentes MD;  [facility]  Service: Endoscopy;  Laterality: N/A;    COLONOSCOPY N/A 12/5/2017    Procedure: COLONOSCOPY;  Surgeon: José Chavira MD;  [facility]  Service: Endoscopy;  Laterality: N/A;    COLONOSCOPY N/A 12/5/2017    Performed by José [facility]ira MD at [facility] ENDO (2ND FLR)    COLONOSCOPY N/A 12/1/2017    [facility] ([facility]    ESOPHAGOGASTRODUODENOSCOPY (EGD) N/A 12/1/2017    [facility] ([facility])    HYSTERECTOMY      complete     LIVER TRANSPLANT  12/2017    TRANSPLANT-LIVER N/A 12/26/2017    [facility]     Imaging / Labs / Studies (reviewed on 11/28/2018):  Results for orders placed during the hospital encounter of 09/15/15   CT Lumbar Spine Without Contrast    Narrative CT of lumbar spine    History: Back pain    Technique: Standard lumbar spine CT protocol was performed without IV contrast.    Finding: Vertebral body heights and alignment are within normal limits.  Mild narrowing at L5-S1 intervertebral disc space with mild central disc bulge.  There is a moderate circumferential bulge at the L4/L5 level effacing the thecal sac.  Remaining   intervertebral discs are within normal limits.  Prevertebral soft tissues are normal.  Visualized abdominal organs are unremarkable.    Impression      Mild degenerative change with disc bulges at L4-L5 and L5-S1.   Lumbar MRI 10/15/18:  Narrative     EXAMINATION:  MRI LUMBAR SPINE WITHOUT CONTRAST    CLINICAL HISTORY:  Low back pain, >6wks conservative tx, persistent-progressive sx, surgical candidate; Radiculopathy, lumbar region    TECHNIQUE:  Multiplanar, multisequence MR images were  acquired from the thoracolumbar junction to the sacrum without the administration of contrast.    COMPARISON:  None.    FINDINGS:  Vertebral body heights and alignment are maintained.  No concerning marrow signal abnormality.  L4 vertebral body hemangioma present.  There is mild disc height loss at L5-S1.  Conus terminates normally.    Intra-abdominal/pelvic visualized structures are unremarkable.    L1-L2: No spinal canal stenosis or neural foraminal narrowing.    L2-L3: No spinal canal stenosis or neural foraminal narrowing.    L3-L4: Mild circumferential disc bulge present.  Facet/ligamentum flavum hypertrophy noted.  Mild bilateral inferior neural foraminal narrowing present.  Mild central spinal canal stenosis present.    L4-L5: Mild circumferential disc bulging present.  Facet ligamentum flavum hypertrophy noted.  Mild spinal canal stenosis with mild left greater than right inferior neural foraminal narrowing.    L5-S1: No significant posterior disc bulge or spinal canal stenosis.  Facet degenerative hypertrophy present with mild left-sided neural foraminal narrowing.      Impression       Mild degenerative changes as above without high-grade spinal canal stenosis or neural foraminal narrowing.     C spine Xray 10/3/18:  Narrative     EXAMINATION:  XR CERVICAL SPINE 5 VIEW WITH FLEX AND EXT    CLINICAL HISTORY:  neck pain;  Radiculopathy, lumbar region    TECHNIQUE:  Five views of the cervical spine plus flexion and extension views were performed.    COMPARISON:  None    FINDINGS:  There is some straightening of the normal cervical lordosis.  Minimal retrolisthesis of C5 on C6 noted.  Vertebral body heights are within normal limits.  No change in spinal alignment with flexion or extension to suggest instability.  There is mild disc height loss at C5-6 and C6-7 with associated degenerative endplate spurring.  Posterior elements appear intact without acute fractures or subluxations demonstrated.  Odontoid  process appears intact.  Atlantoaxial articulations appear normal.  Prevertebral soft tissues are within normal limits.      Impression       Degenerative changes as above.  No acute findings.     Review of Systems:  CONSTITUTIONAL: patient denies any fever, chills, or weight loss  SKIN: patient denies any rash or itching  RESPIRATORY: patient denies having any shortness of breath  GASTROINTESTINAL: patient reports having stool incontinence with some leakage  GENITOURINARY: patient denies having any abnormal bladder function    MUSCULOSKELETAL:  - patient complains of the above noted pain/s (see chief pain complaint)    NEUROLOGICAL:   - pain as above  - strength in Lower extremities is intact, BILATERALLY  - sensation in Lower extremities is intact, BILATERALLY  - patient reports having stool incontinence     PSYCHIATRIC: patient denies any change in mood    Other:  All other systems reviewed and are negative    Physical Exam:  /81   Pulse 72   Wt 83.4 kg (183 lb 13.8 oz)   LMP 01/01/1985 (Approximate) Comment: 1985  BMI 32.57 kg/m²  (reviewed on 11/28/2018)\  General:  Alert and oriented, No acute distress.    HEENT:       EOMI.  Normocephalic, atraumatic.   Cardiovascular:  Regular rate.    Gastrointestinal:  Soft.    Respiratory:  Respirations are non-labored.    Cervical Spine:  No masses or atrophy,  Thoracic Spine:  Palpation normal.    Lumbar Spine:  No masses or atrophy,   Left hand:  She has normal movement of her 2nd through 5th digits with normal capillary refill; she has sensation intact in and the 2nd through 5th digits; the left thumb has normal capillary refill, limited range of motion secondary to pain, and there is a very minimally palpable mobile mass at the base of the left thumb; there was no erythema or swelling associated with the thumb  Motor Exam:      Sensory Exam:  Full and equal sensation to light touch throughout except for the left thumb  Reflexes   Left DTR's       Brachioradialis 2  Biceps 2  Triceps 2  Right DTR's    Brachioradialis 2  Biceps 2  Triceps 2  Neurologic:  Cranial Nerves II-XII are grossly intact.    Pathologic reflexes:            Clonus - Neg   Psychiatric:  Cooperative.    Gait:  Slow, antalgic  Assessment  Lumbar Radiculopathy  Cervical Spondylosis   Lumbar spondylosis    1. 53 y.o. year old patient with PMH of   Past Medical History:   Diagnosis Date    Alcohol abuse     Alcoholic hepatitis     Anemia of chronic disease     Coagulopathy     Encounter for blood transfusion     End stage liver disease     Hypertension     Hyponatremia     Liver cirrhosis     Liver transplant candidate 12/26/2017    Obesity (BMI 30-39.9) 1/5/2016      presenting with pain located in cervical and lumbar spine, bilateral lower extremities,  Left thumb  2. Pain Generators / Etiology :  Cervical spondylosis, lumbar spondylosis, lumbar radiculopathy  3. Failed Meds (E- Effective, NE- Not Effective):  Gabapentin-E  4. Physical Therapy - has been participating since December 2017  5. Psychological comorbidities -  none  6. Anticoagulants / Antiplatelets:  None     PLAN:  1. Medications continue gabapentin and increased to 900 mg 3 times daily  2. PT - continue participating in physical therapy and performing exercises at home  3. Psychological - none  4. Labs - obtain  none; review labs from 09/24/2018, creatinine 0.9  5. Imaging - will obtain left hand x-ray today  6. Interventions - schedule none; consider epidural steroid injections in the future  7. Referrals - patient has neurology appointment scheduled for January and provided referral to Orthopedics for left thumb  8. Records - none  9. Follow up visit - follow up in clinic in 6 weeks  10. Patient Questions - answered all the patient's questions regarding diagnosis, therapy, treatment    KATJA Desai MD  Interventional Pain  [facility] [place]

## 2018-11-30 ENCOUNTER — TELEPHONE (OUTPATIENT)
Dept: TRANSPLANT | Facility: CLINIC | Age: 53
End: 2018-11-30

## 2018-11-30 ENCOUNTER — OFFICE VISIT (OUTPATIENT)
Dept: OTOLARYNGOLOGY | Facility: CLINIC | Age: 53
End: 2018-11-30
Payer: MEDICAID

## 2018-11-30 ENCOUNTER — OFFICE VISIT (OUTPATIENT)
Dept: GASTROENTEROLOGY | Facility: CLINIC | Age: 53
End: 2018-11-30
Payer: MEDICAID

## 2018-11-30 VITALS
BODY MASS INDEX: 32.43 KG/M2 | HEIGHT: 63 IN | HEART RATE: 84 BPM | SYSTOLIC BLOOD PRESSURE: 113 MMHG | DIASTOLIC BLOOD PRESSURE: 77 MMHG | WEIGHT: 183 LBS | TEMPERATURE: 97 F

## 2018-11-30 VITALS
SYSTOLIC BLOOD PRESSURE: 134 MMHG | BODY MASS INDEX: 32.5 KG/M2 | HEART RATE: 75 BPM | WEIGHT: 183.44 LBS | DIASTOLIC BLOOD PRESSURE: 80 MMHG | HEIGHT: 63 IN

## 2018-11-30 DIAGNOSIS — J32.9 RHINOSINUSITIS: Primary | ICD-10-CM

## 2018-11-30 DIAGNOSIS — D84.9 IMMUNOSUPPRESSION: ICD-10-CM

## 2018-11-30 DIAGNOSIS — Z94.4 LIVER TRANSPLANTED: ICD-10-CM

## 2018-11-30 DIAGNOSIS — R15.9 INCONTINENCE OF FECES, UNSPECIFIED FECAL INCONTINENCE TYPE: ICD-10-CM

## 2018-11-30 DIAGNOSIS — J30.89 NON-SEASONAL ALLERGIC RHINITIS, UNSPECIFIED TRIGGER: ICD-10-CM

## 2018-11-30 DIAGNOSIS — R09.81 NASAL CONGESTION: Primary | ICD-10-CM

## 2018-11-30 PROCEDURE — 99204 OFFICE O/P NEW MOD 45 MIN: CPT | Mod: S$PBB,,, | Performed by: PHYSICIAN ASSISTANT

## 2018-11-30 PROCEDURE — 99999 PR PBB SHADOW E&M-EST. PATIENT-LVL IV: CPT | Mod: PBBFAC,,, | Performed by: PHYSICIAN ASSISTANT

## 2018-11-30 PROCEDURE — 99999 PR PBB SHADOW E&M-EST. PATIENT-LVL III: CPT | Mod: PBBFAC,,, | Performed by: INTERNAL MEDICINE

## 2018-11-30 PROCEDURE — 99214 OFFICE O/P EST MOD 30 MIN: CPT | Mod: S$PBB,,, | Performed by: INTERNAL MEDICINE

## 2018-11-30 PROCEDURE — 99214 OFFICE O/P EST MOD 30 MIN: CPT | Mod: PBBFAC,27,PO | Performed by: PHYSICIAN ASSISTANT

## 2018-11-30 PROCEDURE — 99213 OFFICE O/P EST LOW 20 MIN: CPT | Mod: PBBFAC,PO | Performed by: INTERNAL MEDICINE

## 2018-11-30 RX ORDER — METHYLPREDNISOLONE 4 MG/1
TABLET ORAL
Qty: 1 PACKAGE | Refills: 0 | Status: SHIPPED | OUTPATIENT
Start: 2018-11-30 | End: 2018-12-14 | Stop reason: ALTCHOICE

## 2018-11-30 RX ORDER — AMOXICILLIN AND CLAVULANATE POTASSIUM 875; 125 MG/1; MG/1
1 TABLET, FILM COATED ORAL EVERY 12 HOURS
Qty: 20 TABLET | Refills: 0 | Status: SHIPPED | OUTPATIENT
Start: 2018-11-30 | End: 2018-12-10

## 2018-11-30 RX ORDER — LEVOCETIRIZINE DIHYDROCHLORIDE 5 MG/1
5 TABLET, FILM COATED ORAL NIGHTLY
Qty: 30 TABLET | Refills: 11 | Status: SHIPPED | OUTPATIENT
Start: 2018-11-30 | End: 2019-12-01 | Stop reason: SDUPTHER

## 2018-11-30 RX ORDER — GUAIFENESIN 600 MG/1
1200 TABLET, EXTENDED RELEASE ORAL 2 TIMES DAILY
Qty: 40 TABLET | Refills: 0 | COMMUNITY
Start: 2018-11-30 | End: 2018-12-10

## 2018-11-30 NOTE — LETTER
November 30, 2018      Joselin Souza MD  [address]  [place] [address]           [facility]NT  [address] [facility] [place] [address]  Phone: [phone]  Fax: [phone]          Patient: Marcie Bazan   MR Number: [id number]   YOB: 1965   Date of Visit: 11/30/2018       Dear Dr. Joselin Souza:    Thank you for referring Marcie Bazan to me for evaluation. Attached you will find relevant portions of my assessment and plan of care.    If you have questions, please do not hesitate to call me. I look forward to following Marcie Bazan along with you.    Sincerely,    Ellie Fong PA-C    Enclosure  CC:  No Recipients    If you would like to receive this communication electronically, please contact [email or web address] or [phone] to request more information on [employer] Link access.    For providers and/or their staff who would like to refer a patient to [facility], please contact us through our one-stop-shop provider referral line, [facility] , at [phone].    If you feel you have received this communication in error or would no longer like to receive these types of communications, please e-mail [email or web address]         
no

## 2018-11-30 NOTE — PROGRESS NOTES
Transplant Hepatology  Liver Transplant Recipient Follow-up    Transplant Date: 2017  UNOS Native Liver Dx: Alcoholic Cirrhosis    Marcie is here for follow up of her liver transplant.    ORGAN: LIVER  Whole or Partial: whole liver  Donor Type:  - brain death  CDC High Risk: no  Donor CMV Status: Negative  Donor HCV Status: Negative  Donor HBcAb: Negative  Biliary Anastomosis: end to end  Arterial Anatomy: standard  IVC reconstruction: cavaplasty piggyback  Portal vein status: patent  .    Subjective:     Interval History:  Currently, she is doing with difficulty. Current complaints include issues with chronic congestion.  She reports that she has had persistent congestion in her nose and sinuses.  She is having significant rhinorrhea.  She denies any fevers or chills. Her symptoms with the congestion have been mild to moderate in significantly affecting her quality of life.  She has tried a number of things over the counter without much benefit.  She also continues to have a variety of musculoskeletal pains for which she is getting physical therapy and being evaluated by primary care doctor.    She also reports increasing issues with fecal incontinence.  Is not always related to having a bowel movement or urgency.  She can be incontinent any time.  She denies any problems that she knows of with constipation.    Review of Systems   Constitutional: Negative.    HENT: Positive for congestion, rhinorrhea and sinus pain.    Eyes: Negative.    Respiratory: Negative.    Cardiovascular: Negative.    Gastrointestinal: Negative.         Fecal incontinence   Genitourinary: Negative.    Musculoskeletal: Positive for arthralgias, gait problem and myalgias.   Skin: Negative.    Psychiatric/Behavioral: Negative.        Objective:     Physical Exam    WBC   Date Value Ref Range Status   2018 6.82 3.90 - 12.70 K/uL Final     Hemoglobin   Date Value Ref Range Status   2018 11.1 (L) 12.0 - 16.0 g/dL Final      POC Hematocrit   Date Value Ref Range Status   12/26/2017 28 (L) 36 - 54 %PCV Final     Hematocrit   Date Value Ref Range Status   11/26/2018 36.6 (L) 37.0 - 48.5 % Final     Platelets   Date Value Ref Range Status   11/26/2018 65 (L) 150 - 350 K/uL Final     BUN, Bld   Date Value Ref Range Status   11/26/2018 15 6 - 20 mg/dL Final     Creatinine   Date Value Ref Range Status   11/26/2018 0.8 0.5 - 1.4 mg/dL Final     Glucose   Date Value Ref Range Status   11/26/2018 86 70 - 110 mg/dL Final     Calcium   Date Value Ref Range Status   11/26/2018 9.6 8.7 - 10.5 mg/dL Final     Sodium   Date Value Ref Range Status   11/26/2018 141 136 - 145 mmol/L Final     Potassium   Date Value Ref Range Status   11/26/2018 3.5 3.5 - 5.1 mmol/L Final     Chloride   Date Value Ref Range Status   11/26/2018 105 95 - 110 mmol/L Final     Magnesium   Date Value Ref Range Status   11/26/2018 1.6 1.6 - 2.6 mg/dL Final     AST   Date Value Ref Range Status   11/26/2018 17 10 - 40 U/L Final     ALT   Date Value Ref Range Status   11/26/2018 17 10 - 44 U/L Final     Alkaline Phosphatase   Date Value Ref Range Status   11/26/2018 180 (H) 55 - 135 U/L Final     Total Bilirubin   Date Value Ref Range Status   11/26/2018 0.3 0.1 - 1.0 mg/dL Final     Comment:     For infants and newborns, interpretation of results should be based  on gestational age, weight and in agreement with clinical  observations.  Premature Infant recommended reference ranges:  Up to 24 hours.............<8.0 mg/dL  Up to 48 hours............<12.0 mg/dL  3-5 days..................<15.0 mg/dL  6-29 days.................<15.0 mg/dL       Albumin   Date Value Ref Range Status   11/26/2018 3.5 3.5 - 5.2 g/dL Final     INR   Date Value Ref Range Status   03/09/2018 1.0 0.8 - 1.2 Final     Comment:     Coumadin Therapy:  2.0 - 3.0 for INR for all indicators except mechanical heart valves  and antiphospholipid syndromes which should use 2.5 - 3.5.       Lab Results    Component Value Date    TACROLIMUS 4.8 (L) 11/26/2018           Assessment/Plan:     1. Nasal congestion    2. Incontinence of feces, unspecified fecal incontinence type    3. Immunosuppression    4. Liver transplanted      Nasal congestion-seems to be persistent not responsive to over-the-counter medication  -will refer patient to see ENT  -     Ambulatory Referral to ENT    Incontinence of feces, unspecified fecal incontinence type-unclear etiology but colonoscopy previously did no large hemorrhoids and anal fissure which could be contributing  -provided patient with a handout for bulk forming fiber    Immunosuppression  -continue current immunosuppression, after 1 year will try to get off cellcept    Liver transplanted-good allograft function  -continue with routine labs    Return to clinic in 3 months which should be just after 1 year visit and testing    Patient was seen in the liver transplant department at [facility].    MD JUAN Marie Patient Status  Functional Status: 70% - Cares for self: unable to carry on normal activity or active work  Physical Capacity: Limited Mobility

## 2018-11-30 NOTE — PROGRESS NOTES
Referring Provider:    Joselin Souza Md  [address] [place] [address]  Subjective:   Patient: Marcie Bazan [id number], :1965   Visit date:2018 3:46 PM    Chief Complaint:  Nasal Congestion    HPI:  Marcie is a 53 y.o. female who I was asked to see in consultation for evaluation of the following issue(s):    Patient presents to clinic for the first time c/o severe nasal congestion, facial pain and pressure, and otalgia x 1 week. She c/o pressure in her ears and around her eyes. She c/o a productive cough with thick, dark green mucus. She c/o a persistent post-nasal drip. She states she always has allergy symptoms and this never seems to completely go away. She has never had allergy testing. She denies any fever(s).  She has failed tx with Zyrtec and Allegra. She is unable to tolerate Flonase and reports nose bleeding with using this. No other relieving factors.     Review of Systems:  Negative unless checked off.  Gen:  []fever   [x]fatigue  HENT:  []nosebleeds  []dental problem   Eyes:  []photophobia  []visual disturbance  Resp:  []chest tightness []wheezing  Card:  []chest pain  []leg swelling  GI:  []abdominal pain []blood in stool  :  []dysuria  []hematuria  Musc:  []joint swelling  []gait problem  Skin:  []color change  []pallor  Neuro:  []seizures  []numbness  Hem:  []bruise/bleed easily  Psych:  []hallucinations  []behavioral problems  Allergy/Imm: is allergic to ferrous sulfate.    Her meds, allergies, medical, surgical, social & family histories were reviewed & updated:  -     She has a current medication list which includes the following prescription(s): atorvastatin, famotidine, furosemide, gabapentin, magnesium oxide, methocarbamol, mycophenolate, nifedipine, tacrolimus, trazodone, amoxicillin-clavulanate 875-125mg, guaifenesin, levocetirizine, and methylprednisolone.  -     She  has a past medical history of Alcohol abuse, Alcoholic hepatitis, Anemia of chronic disease,  "Coagulopathy, Encounter for blood transfusion, End stage liver disease, Hypertension, Hyponatremia, Liver cirrhosis, Liver transplant candidate (12/26/2017), and Obesity (BMI 30-39.9) (1/5/2016).   -     She does not have any pertinent problems on file.   -     She  has a past surgical history that includes Cholecystectomy; Hysterectomy; Breast biopsy (Left); Liver transplant (12/2017); TRANSPLANT-LIVER (N/A, 12/26/2017); COLONOSCOPY (N/A, 12/5/2017); ESOPHAGOGASTRODUODENOSCOPY (EGD) (N/A, 12/1/2017); and COLONOSCOPY (N/A, 12/1/2017).  -     She  reports that  has never smoked. she has never used smokeless tobacco. She reports that she does not drink alcohol or use drugs.  -     Her family history includes Alzheimer's disease in her mother; Breast cancer in her paternal aunt; Depression in her maternal grandfather; Diabetes in her father and sister; Hypertension in her father and mother.  -     She is allergic to ferrous sulfate.    Objective:     Physical Exam:  Vitals:  /77   Pulse 84   Temp 96.9 °F (36.1 °C)   Ht 5' 3" (1.6 m)   Wt 83 kg (182 lb 15.7 oz)   LMP 01/01/1985 (Approximate) Comment: 1985  BMI 32.41 kg/m²   General appearance:  Well developed, well nourished    Eyes:  Extraocular motions intact, PERRL    Communication:  no hoarseness, no dysphonia    Ears:  L TM is retracted, clear, intact. R TM WNL. Bilateral EAC's WNL.   Nose:  No masses/lesions of external nose, nasal mucosa, septum, and turbinates were within normal limits.  Mouth:  No mass/lesion of lips, teeth, gums, hard/soft palate, tongue, tonsils, or oropharynx. Moderate erythema of oropharynx, 2+ tonsils, no exudates.     Cardiovascular:  No pedal edema; Radial Pulses +2     Neck & Lymphatics:  No cervical lymphadenopathy, no neck mass/crepitus/ asymmetry, trachea is midline, no thyroid enlargement/tenderness/mass.    Psych: Oriented x3,  Alert with normal mood and affect.     Respiration/Chest:  Symmetric expansion during " respiration, normal respiratory effort.    Skin:  Warm and intact. No ulcerations of face, scalp, neck.    Assessment & Plan:   Marcie was seen today for nasal congestion.    Diagnoses and all orders for this visit:    Rhinosinusitis    Non-seasonal allergic rhinitis, unspecified trigger    Other orders  -     amoxicillin-clavulanate 875-125mg (AUGMENTIN) 875-125 mg per tablet; Take 1 tablet by mouth every 12 (twelve) hours. for 10 days  -     methylPREDNISolone (MEDROL DOSEPACK) 4 mg tablet; use as directed  -     levocetirizine (XYZAL) 5 MG tablet; Take 1 tablet (5 mg total) by mouth every evening.  -     guaiFENesin (MUCINEX) 600 mg 12 hr tablet; Take 2 tablets (1,200 mg total) by mouth 2 (two) times daily. for 10 days    Augmentin x 10 days  Medrol Dosepak  Mucinex  Nasal Saline Rinses/ Flonase Sensimist  Xyzal QHS   Recheck in 2 weeks.     We discussed her medical conditions, treatments and plan.  Marcie should return to clinic if any issues arise (symptoms worsen or persist), otherwise we will see her back in the clinic In 2 weeks.    Over 25 minutes were spent in face to face contact with the patient with greater than 50% spent discussing their diagnosis and coordination of care.     Thank you for allowing me to participate in the care of Marcie.    Ellie Fong PA-C

## 2018-11-30 NOTE — TELEPHONE ENCOUNTER
----- Message from Joselin Souza MD sent at 11/29/2018  4:53 PM CST -----  Reviewed, nothing to do; repeat per routine

## 2018-11-30 NOTE — PATIENT INSTRUCTIONS
PLEASE PERFORM SINUS RINSES 3-4 TIMES DAILY UNTIL YOUR NEXT VISIT.          DIRECTIONS FOR SINUS SALINE RINSE To see demonstration: Enter http://www.youtube.com/watch?v=[email or web address] into the browser address box, or go to You tube, and under the search box, enter sinus rinse. Click on [facility] Sinus Rinse Video    Step 1    Step 1 Please wash your hands. Fill the clean bottle with the designated volume of warm distilled water, filtered water or previously boiled water. You may warm the water in a microwave but we recommend that you warm it in increments of five seconds. This is to avoid excessive heating of the water and damage to the device or scalding your nasal passage.    Step 2    Step 2 Cut the SINUS RINSE mixture packet at the corner and pour its contents into the bottle. Tighten the cap & tube on the bottle securely, place one finger over the tip of the cap and shake the bottle gently to dissolve the mixture.      Step 3    Step 3 Standing in front of a sink, bend forward to your comfort level and tilt your head down. Keeping your mouth open without holding your breath, place cap snugly against your nasal passage and SQUEEZE BOTTLE GENTLY until the solution starts draining from the OPPOSITE nasal passage or from your mouth. Keep squeezing the bottle GENTLY until at least 1/4 to 1/2 (60 to 120 mL) of the bottle is used for a proper rinse. Do not swallow the solution.    Step 4    Blow your nose gently, without pinching your nose completely because this will apply pressure on the    eardrums. If tolerable, sniff in any residual solution remaining in the nasal passage once or twice prior to    blowing your nose as this may clean out the posterior nasopharyngeal area (the area at the back of your    nasal passage). Some solution will reach the back of the throat, so please spit it out. To help improve    drainage of any residual solution, blow your nose gently while tilting your head to the opposite side  of    the nasal passage that you just rinsed.    Step 5    Now repeat steps 3 & 4 for your other nasal passage.    Step 6 Air dry the Sinus Rinse bottle, cap, and tube on a clean paper towel, a glass plate to store the bottle cap and tube. If there is any solution leftover, please discard it. We recommend you make a fresh solution each time you rinse. Rinse 5 times each day, OR as directed by your physician. Warnings: DO NOT RINSE IF NASAL PASSAGE IS COMPLETELY BLOCKED OR IF YOU HAVE AN EAR INFECTION OR BLOCKED EARS. If you have had ear surgery, please contact your physician prior to irrigation. If you experience any pressure in your ears, stop the rinse and get further directions from your physician or contact our office during regular business hours. To avoid any ear discomfort: Heat the solution to lukewarm, do not use hot, boiling or cold water. Keep your mouth open. Do not hold your breath and if possible make the sound STONE....STONE... Make sure to take the position as shown. Gently squeeze 1/4 of the bottle at a time (60mL / 2 ounces). Stop the rinse if you feel any solution sensation near the ears. You may rinse with a partially blocked nasal passage. Please do not use for any other purposes. Please rinse at least ONE HOUR PRIOR to bedtime, in order to avoid any residual solution dripping in the throat.    >> Before using the SINUS RINSE kit, please inspect the cap, tube and bottle carefully for wear and    tear. If any of the components appear discolored or cracked, please contact [facility] to obtain a    replacement. You must follow the cleaning instructions provided in this brochure or cleaning instruction    card prior to each use.    >> The SINUS RINSE bottle and mixture are to be used only for nasalirrigation. Do not use for any other    purposes.    >> We recommend that you use the rinse ONE HOUR PRIOR to bedtime in order to avoid any residual    solution dripping in the throat.    Tips to avoid ear  discomfort while rinsing    If you have had ear surgery, please contact your physician prior to irrigation. Do not use if you have an    ear infection or blocked ears. Rinse with lukewarm water. Keep your mouth open. Do not hold your    breath while rinsing. While rinsing, make sure to tilt your. Gently squeeze the bottle while rinsing; do    not squeeze the bottle very forcefully. Stop the rinse if you feel a sensation of fluid near your ears.    Tips to avoid unexpected drainage after rinsing    In rare situations, especially if you have had sinus surgery, the saline solution can pool in the sinus    cavities and nasal passages and then drip from your nostrils hours after rinsing. To avoid this harmless    but annoying inconvenience, take one extra step after rinsing: lean forward, tilt your head sideways and    gently blow your nose. Then, tilt your head to the other side and blow again. You may need to repeat    this several times. This will help rid your nasal passages of any excess mucus and remaining saline    solution. If you find yourself experiencing delayed drainage often, do not rinse right before leaving your    house or going to bed.

## 2018-11-30 NOTE — TELEPHONE ENCOUNTER
Dr. Souza reviewed your labs.  Sent patient message to let her know labs are stable, no medication changes, please repeat your labs on 12/31/18

## 2018-12-03 ENCOUNTER — TELEPHONE (OUTPATIENT)
Dept: TRANSPLANT | Facility: CLINIC | Age: 53
End: 2018-12-03

## 2018-12-03 NOTE — TELEPHONE ENCOUNTER
----- Message from Belgica Marquez sent at 12/3/2018 12:30 PM CST -----  Contact: Patient   Needs Advice    Reason for call: patient would like speak with Cassidy concerning donor family information         Communication Preference: [phone]    Additional Information: n/a

## 2018-12-05 ENCOUNTER — OFFICE VISIT (OUTPATIENT)
Dept: ORTHOPEDICS | Facility: CLINIC | Age: 53
End: 2018-12-05
Payer: MEDICAID

## 2018-12-05 VITALS
DIASTOLIC BLOOD PRESSURE: 86 MMHG | HEIGHT: 63 IN | HEART RATE: 88 BPM | BODY MASS INDEX: 32.43 KG/M2 | WEIGHT: 183 LBS | SYSTOLIC BLOOD PRESSURE: 140 MMHG

## 2018-12-05 DIAGNOSIS — M65.312 TRIGGER FINGER OF LEFT THUMB: Primary | ICD-10-CM

## 2018-12-05 DIAGNOSIS — Z94.4 LIVER REPLACED BY TRANSPLANT: ICD-10-CM

## 2018-12-05 PROCEDURE — 20550 NJX 1 TENDON SHEATH/LIGAMENT: CPT | Mod: PBBFAC,PO | Performed by: PHYSICIAN ASSISTANT

## 2018-12-05 PROCEDURE — 99214 OFFICE O/P EST MOD 30 MIN: CPT | Mod: PBBFAC,PO | Performed by: PHYSICIAN ASSISTANT

## 2018-12-05 PROCEDURE — 99203 OFFICE O/P NEW LOW 30 MIN: CPT | Mod: 25,S$PBB,, | Performed by: PHYSICIAN ASSISTANT

## 2018-12-05 PROCEDURE — 99999 PR PBB SHADOW E&M-EST. PATIENT-LVL IV: CPT | Mod: PBBFAC,,, | Performed by: PHYSICIAN ASSISTANT

## 2018-12-05 PROCEDURE — 20550 NJX 1 TENDON SHEATH/LIGAMENT: CPT | Mod: S$PBB,FA,, | Performed by: PHYSICIAN ASSISTANT

## 2018-12-05 RX ORDER — METHYLPREDNISOLONE ACETATE 40 MG/ML
40 INJECTION, SUSPENSION INTRA-ARTICULAR; INTRALESIONAL; INTRAMUSCULAR; SOFT TISSUE ONCE
Status: COMPLETED | OUTPATIENT
Start: 2018-12-05 | End: 2018-12-05

## 2018-12-05 RX ADMIN — METHYLPREDNISOLONE ACETATE 40 MG: 40 INJECTION, SUSPENSION INTRALESIONAL; INTRAMUSCULAR; INTRASYNOVIAL; SOFT TISSUE at 12:12

## 2018-12-05 NOTE — LETTER
December 5, 2018      David Desai MD  [id number] [place] [address] [address] [id number]           [facility]  [address] [facility] [address] [place] [address]  Phone: [phone]  Fax: [phone]          Patient: Marcie Bazan   MR Number: [id number]   YOB: 1965   Date of Visit: 12/5/2018       Dear Dr. David Desai:    Thank you for referring Marcie Bazan to me for evaluation. Attached you will find relevant portions of my assessment and plan of care.    If you have questions, please do not hesitate to call me. I look forward to following Marcie Bazan along with you.    Sincerely,    Rayne Brothers PA-C    Enclosure  CC:  No Recipients    If you would like to receive this communication electronically, please contact [email or web address] or [phone] to request more information on [employer] access.    For providers and/or their staff who would like to refer a patient to [facility], please contact us through our one-stop-shop provider referral line, [facility] , at [phone].    If you feel you have received this communication in error or would no longer like to receive these types of communications, please e-mail [email or web address]

## 2018-12-05 NOTE — PROGRESS NOTES
Subjective:      Patient ID: Marcie Bazan is a 53 y.o. female.    Chief Complaint: Pain of the Left Hand      HPI: Marcie Bazan  is a 53 y.o. female who c/o Pain of the Left Hand   for duration of a couple of months.  She denies any inciting injury.  She denies associated numbness and tingling.  Pain level 10/10 in severity.  She points the palmar surface of the 1st MCP joint on the left hand is for pain is located.  Is worsening morning time as well as with flexion.  It is improved with avoidance of flexion of the left thumb.  Quality is aching and stabbing.  She denies associated swelling    Past Medical History:   Diagnosis Date    Alcohol abuse     Alcoholic hepatitis     Anemia of chronic disease     Coagulopathy     Encounter for blood transfusion     End stage liver disease     Hypertension     Hyponatremia     Liver cirrhosis     Liver transplant candidate 12/26/2017    Obesity (BMI 30-39.9) 1/5/2016     Past Surgical History:   Procedure Laterality Date    BREAST BIOPSY Left     benign    CHOLECYSTECTOMY      COLONOSCOPY N/A 12/1/2017    Procedure: COLONOSCOPY;  Surgeon: Filemon Fuentes MD;  [facility]  Service: Endoscopy;  Laterality: N/A;    COLONOSCOPY N/A 12/5/2017    Procedure: COLONOSCOPY;  Surgeon: José Chavira MD;  Location: [facility] ([address] [place] [address]);  Service: Endoscopy;  Laterality: N/A;    COLONOSCOPY N/A 12/5/2017    Performed by José Chavira MD at [facility] (2ND FLR)    COLONOSCOPY N/A 12/1/2017    Performed by Filemon Fuentes MD at [facility] (2ND FLR)    ESOPHAGOGASTRODUODENOSCOPY (EGD) N/A 12/1/2017    Performed by Filemon Fuentes MD at [facility] (2ND FLR)    HYSTERECTOMY      complete     LIVER TRANSPLANT  12/2017    TRANSPLANT-LIVER N/A 12/26/2017    Performed by [facility] at [facility] OR 2ND FLR     Family History   Problem Relation Age of Onset    Hypertension Mother     Alzheimer's disease Mother     Hypertension Father     Diabetes  Father     Diabetes Sister     Depression Maternal Grandfather     Breast cancer Paternal Aunt      Social History     Socioeconomic History    Marital status: Single     Spouse name: Not on file    Number of children: 2    Years of education: Not on file    Highest education level: Not on file   Social Needs    Financial resource strain: Not on file    Food insecurity - worry: Not on file    Food insecurity - inability: Not on file    Transportation needs - medical: Not on file    Transportation needs - non-medical: Not on file   Occupational History    Not on file   Tobacco Use    Smoking status: Never Smoker    Smokeless tobacco: Never Used   Substance and Sexual Activity    Alcohol use: No     Frequency: Never     Comment: Currently admitted to inpatient rehab 7/2017    Drug use: No    Sexual activity: Not Currently   Other Topics Concern    Not on file   Social History Narrative    Not on file        Medication List           Accurate as of 12/5/18 12:36 PM. If you have any questions, ask your nurse or doctor.               CONTINUE taking these medications    amoxicillin-clavulanate 875-125mg 875-125 mg per tablet  Commonly known as:  AUGMENTIN  Take 1 tablet by mouth every 12 (twelve) hours. for 10 days     atorvastatin 40 MG tablet  Commonly known as:  LIPITOR  Take 1 tablet (40 mg total) by mouth once daily.     famotidine 20 MG tablet  Commonly known as:  PEPCID  Take 1 tablet (20 mg total) by mouth every evening.     furosemide 40 MG tablet  Commonly known as:  LASIX  TAKE 1 TABLET(40 MG) BY MOUTH EVERY DAY     gabapentin 600 MG tablet  Commonly known as:  NEURONTIN     guaiFENesin 600 mg 12 hr tablet  Commonly known as:  MUCINEX  Take 2 tablets (1,200 mg total) by mouth 2 (two) times daily. for 10 days     levocetirizine 5 MG tablet  Commonly known as:  XYZAL  Take 1 tablet (5 mg total) by mouth every evening.     magnesium oxide 400 mg (241.3 mg magnesium) tablet  Commonly known as:   MAG-OX  Take 3 tablets (1,200 mg total) by mouth 3 (three) times daily.     methocarbamol 750 MG Tab  Commonly known as:  ROBAXIN  Take 1 tablet (750 mg total) by mouth 3 (three) times daily as needed (muscle spasms).     methylPREDNISolone 4 mg tablet  Commonly known as:  MEDROL DOSEPACK  use as directed     mycophenolate 250 mg Cap  Commonly known as:  CELLCEPT  Take 2 capsules (500 mg total) by mouth 2 (two) times daily.     NIFEdipine 60 MG (OSM) 24 hr tablet  Commonly known as:  PROCARDIA-XL  Take 1 tablet (60 mg total) by mouth once daily.     tacrolimus 1 MG Cap  Commonly known as:  PROGRAF  Take 2 capsules (2 mg total) by mouth every 12 (twelve) hours.     traZODone 50 MG tablet  Commonly known as:  DESYREL  Take 0.5 tablets (25 mg total) by mouth every evening.          Review of patient's allergies indicates:   Allergen Reactions    Ferrous sulfate Other (See Comments)     Patient states the pill makes her sick. She stated she would rather have a shot       Review of Systems   Constitution: Negative for fever.   Cardiovascular: Negative for chest pain.   Respiratory: Negative for cough and shortness of breath.    Skin: Negative for rash.   Musculoskeletal: Positive for joint pain, joint swelling and stiffness.   Gastrointestinal: Negative for heartburn.   Neurological: Negative for headaches and numbness.         Objective:        General    Nursing note and vitals reviewed.  Constitutional: She is oriented to person, place, and time. She appears well-developed and well-nourished.   HENT:   Head: Normocephalic and atraumatic.   Eyes: EOM are normal.   Cardiovascular: Normal rate and regular rhythm.    Pulmonary/Chest: Effort normal.   Abdominal: Soft.   Neurological: She is alert and oriented to person, place, and time.   Psychiatric: She has a normal mood and affect. Her behavior is normal.             Right Hand/Wrist Exam     Range of Motion     Wrist   Extension: normal   Flexion: normal   Pronation:  normal   Supination: normal       Left Hand/Wrist Exam     Inspection   Scars: Wrist - absent Hand -  absent  Effusion: Wrist - absent Hand -  absent  Bruising: Wrist - absent Hand -  absent  Deformity: Wrist - absent Hand -  absent    Tenderness   The patient is tender to palpation of the tripp area.     Range of Motion     Wrist   Extension: normal   Flexion: normal   Pronation: normal   Supination: normal     Tests     Atrophy  Thenar:  Negative  Hypothenar:  negative  Intrinsic: negative  1st Dorsal Interosseous:  negative    Other     Sensory Exam  Median Distribution: normal  Ulnar Distribution: normal  Radial Distribution: normal    Comments:  2+ radial pulse  TTP A1 pulley thumb  No active triggering today           Muscle Strength   Right Upper Extremity   Wrist extension: 5/5/5   Wrist flexion: 5/5/5   : 5/5/5   Pinch Mechanism: 5/5  Left Upper Extremity  Wrist extension: 5/5/5   Wrist flexion: 5/5/5   :  4/5/5   Pinch Mechanism: 4/5    Vascular Exam       Capillary Refill  Left Hand: normal capillary refill            Xray:   Left hand from 11/28/18 images and report were reviewed today.  I agree with the radiologist's interpretation.  Minimal degenerative change noted in the wrist and hand without fracture or dislocation.  No focal erosion or periosteal reaction.  Soft tissues within normal limits.  No foreign body or abnormal calcification identified.      Assessment:       Encounter Diagnoses   Name Primary?    Trigger finger of left thumb Yes    Liver replaced by transplant           Plan:       Marcie was seen today for pain.    Diagnoses and all orders for this visit:    Trigger finger of left thumb  -     methylPREDNISolone acetate injection 40 mg    Liver replaced by transplant    Ms. Bazan comes in today for new problem of the left thumb.  Suspect she is having problems with the trigger thumb.  We have discussed risks and benefits of an injection of the left trigger thumb.  She wishes to  proceed.  She is on medications status post liver transplant.  She is immunocompromised.  She understands she has a slightly higher risk of infection following injection than other patients do.  She still wishes to proceed. If she develops fevers, erythema, worsening swelling, or worsening pain, she should notify the office immediately.  Also discussed is a small risk of tendon rupture any time injection is administered in a tendon sheath.  I will see her back on an as-needed basis.  If she is not making it improvements over the next few weeks, she will call to schedule an appointment to be seen again. She verbalizes understanding and agrees.    Follow-up if symptoms worsen or fail to improve.          Left Trigger finger Injection Report:  After verbal consent was obtained for left trigger finger injection, patient ID, site, and side were verified.  The  left thumb finger was sterilly prepped at the A-1 pulley in standard fashion.  A 25-gauge needle was introduced at the A-1 pulley site without complication. It was then injected with 5 mg lidocaine plain and 40 mg depomedrol.  A sterile bandaid was applied.  The patient was informed to apply an ice pack approximately 10min once arriving home and not to do anything strenuous for 24hours. She  was instructed to call if there were any problems. The patient was discharged in stable condition.    The patient understands, chooses and consents to this plan and accepts all   the risks which include but are not limited to the risks mentioned above.     Disclaimer: This note was prepared using a voice recognition system and is likely to have sound alike errors within the text.

## 2018-12-07 ENCOUNTER — TELEPHONE (OUTPATIENT)
Dept: RADIOLOGY | Facility: HOSPITAL | Age: 53
End: 2018-12-07

## 2018-12-07 DIAGNOSIS — E55.9 VITAMIN D DEFICIENCY: ICD-10-CM

## 2018-12-08 RX ORDER — ERGOCALCIFEROL 1.25 MG/1
CAPSULE ORAL
Qty: 10 CAPSULE | Refills: 0 | Status: SHIPPED | OUTPATIENT
Start: 2018-12-08 | End: 2019-01-23 | Stop reason: SDUPTHER

## 2018-12-10 ENCOUNTER — HOSPITAL ENCOUNTER (OUTPATIENT)
Dept: RADIOLOGY | Facility: HOSPITAL | Age: 53
Discharge: HOME | End: 2018-12-10
Attending: INTERNAL MEDICINE
Payer: MEDICAID

## 2018-12-10 DIAGNOSIS — Z94.4 LIVER REPLACED BY TRANSPLANT: ICD-10-CM

## 2018-12-10 PROCEDURE — 93975 VASCULAR STUDY: CPT | Mod: 26,,, | Performed by: RADIOLOGY

## 2018-12-10 PROCEDURE — 93975 VASCULAR STUDY: CPT | Mod: TC,PO

## 2018-12-10 PROCEDURE — 76705 ECHO EXAM OF ABDOMEN: CPT | Mod: TC,PO,59

## 2018-12-10 PROCEDURE — 76705 ECHO EXAM OF ABDOMEN: CPT | Mod: 26,XS,, | Performed by: RADIOLOGY

## 2018-12-14 ENCOUNTER — TELEPHONE (OUTPATIENT)
Dept: TRANSPLANT | Facility: CLINIC | Age: 53
End: 2018-12-14

## 2018-12-14 ENCOUNTER — OFFICE VISIT (OUTPATIENT)
Dept: OTOLARYNGOLOGY | Facility: CLINIC | Age: 53
End: 2018-12-14
Payer: COMMERCIAL

## 2018-12-14 VITALS
WEIGHT: 183.88 LBS | BODY MASS INDEX: 32.57 KG/M2 | SYSTOLIC BLOOD PRESSURE: 113 MMHG | DIASTOLIC BLOOD PRESSURE: 72 MMHG | HEART RATE: 89 BPM

## 2018-12-14 DIAGNOSIS — J32.9 RHINOSINUSITIS: Primary | ICD-10-CM

## 2018-12-14 PROCEDURE — 99212 OFFICE O/P EST SF 10 MIN: CPT | Mod: PBBFAC,PO | Performed by: OTOLARYNGOLOGY

## 2018-12-14 PROCEDURE — 99213 OFFICE O/P EST LOW 20 MIN: CPT | Mod: S$PBB,,, | Performed by: OTOLARYNGOLOGY

## 2018-12-14 PROCEDURE — 99999 PR PBB SHADOW E&M-EST. PATIENT-LVL II: CPT | Mod: PBBFAC,,, | Performed by: OTOLARYNGOLOGY

## 2018-12-14 NOTE — PROGRESS NOTES
Subjective:   Patient: Marcie Bazan [id number], :1965   Visit date:2018 8:24 AM    Chief Complaint:  Follow-up (no complains )    HPI:  Marcie is a 53 y.o. female who is here for follow-up. She was last here on 18 for acute rhino sinusitis and AR tx with Augmentin x 10 days, a Medrol Dosepak, Mucinex, Xyzal, and Flonase. She returns to clinic on 18 and reports resolution of all of her symptoms.  She is no longer having significant drainage.  She is breathing better through the nose.  Overall, her energy level is improved.        Review of Systems:  -     Allergic/Immunologic: is allergic to ferrous sulfate..  -     Constitutional: Current temp:      Her meds, allergies, medical, surgical, social & family histories were reviewed & updated:  -     She has a current medication list which includes the following prescription(s): atorvastatin, ergocalciferol, famotidine, furosemide, gabapentin, levocetirizine, magnesium oxide, methocarbamol, mycophenolate, nifedipine, tacrolimus, and trazodone.  -     She  has a past medical history of Alcohol abuse, Alcoholic hepatitis, Anemia of chronic disease, Coagulopathy, Encounter for blood transfusion, End stage liver disease, Hypertension, Hyponatremia, Liver cirrhosis, Liver transplant candidate (2017), and Obesity (BMI 30-39.9) (2016).   -     She does not have any pertinent problems on file.   -     She  has a past surgical history that includes Cholecystectomy; Hysterectomy; Breast biopsy (Left); Liver transplant (2017); TRANSPLANT-LIVER (N/A, 2017); COLONOSCOPY (N/A, 2017); ESOPHAGOGASTRODUODENOSCOPY (EGD) (N/A, 2017); and COLONOSCOPY (N/A, 2017).  -     She  reports that  has never smoked. she has never used smokeless tobacco. She reports that she does not drink alcohol or use drugs.  -     Her family history includes Alzheimer's disease in her mother; Breast cancer in her paternal aunt; Depression in her maternal  grandfather; Diabetes in her father and sister; Hypertension in her father and mother.  -     She is allergic to ferrous sulfate.    Objective:     Physical Exam:  Vitals:  /72   Pulse 89   Wt 83.4 kg (183 lb 13.8 oz)   LMP 01/01/1985 (Approximate) Comment: 1985  BMI 32.57 kg/m²   Communication:  Able to communicate, no hoarseness.  Head & Face:  Normocephalic, atraumatic, no sinus tenderness.  Eyes:  Extraocular motions intact.  Ears:  Otoscopy of external auditory canals and tympanic membranes was normal, clinical speech reception thresholds grossly intact, no mass/lesion of auricle.  Nose:  No masses/lesions of external nose, nasal mucosa, septum, and turbinates were within normal limits.  Mouth:  No mass/lesion of lips, teeth, gums, hard/soft palate, tongue, tonsils, or oropharynx.  Neck & Lymphatics:  No cervical lymphadenopathy, no neck mass/crepitus/ asymmetry, trachea is midline, no thyroid enlargement/tenderness/mass.  Neuro/Psych: Alert with normal mood and affect.   Respiration/Chest:  Symmetric expansion during respiration, normal respiratory effort.  Skin:  Warm and intact.    Assessment & Plan:     We discussed her medical conditions, treatments and plan.  Marcie should return to clinic if any issues arise (symptoms worsen or persist), otherwise we will see her back in the clinic only as needed.

## 2018-12-14 NOTE — TELEPHONE ENCOUNTER
Sent patient a letter to let her know her Ultrasound was stable, no further testing needed at this time.

## 2018-12-14 NOTE — TELEPHONE ENCOUNTER
----- Message from Joselin Souza MD sent at 12/13/2018  6:05 PM CST -----  Reviewed, nothing to do; repeat per routine

## 2018-12-17 ENCOUNTER — TELEPHONE (OUTPATIENT)
Dept: NEUROLOGY | Facility: CLINIC | Age: 53
End: 2018-12-17

## 2018-12-24 DIAGNOSIS — G62.9 NEUROPATHY: ICD-10-CM

## 2018-12-24 DIAGNOSIS — M25.612 DECREASED SHOULDER MOBILITY, LEFT: ICD-10-CM

## 2019-01-02 ENCOUNTER — TELEPHONE (OUTPATIENT)
Dept: TRANSPLANT | Facility: CLINIC | Age: 54
End: 2019-01-02

## 2019-01-02 ENCOUNTER — LAB VISIT (OUTPATIENT)
Dept: LAB | Facility: HOSPITAL | Age: 54
End: 2019-01-02
Attending: INTERNAL MEDICINE
Payer: MEDICAID

## 2019-01-02 DIAGNOSIS — Z94.4 LIVER REPLACED BY TRANSPLANT: ICD-10-CM

## 2019-01-02 LAB
ALBUMIN SERPL BCP-MCNC: 3.6 G/DL
ALP SERPL-CCNC: 219 U/L
ALT SERPL W/O P-5'-P-CCNC: 22 U/L
ANION GAP SERPL CALC-SCNC: 7 MMOL/L
AST SERPL-CCNC: 16 U/L
BASOPHILS # BLD AUTO: 0.02 K/UL
BASOPHILS NFR BLD: 0.3 %
BILIRUB SERPL-MCNC: 0.3 MG/DL
BUN SERPL-MCNC: 22 MG/DL
CALCIUM SERPL-MCNC: 10 MG/DL
CHLORIDE SERPL-SCNC: 106 MMOL/L
CO2 SERPL-SCNC: 29 MMOL/L
CREAT SERPL-MCNC: 0.8 MG/DL
DIFFERENTIAL METHOD: ABNORMAL
EOSINOPHIL # BLD AUTO: 0.1 K/UL
EOSINOPHIL NFR BLD: 1.8 %
ERYTHROCYTE [DISTWIDTH] IN BLOOD BY AUTOMATED COUNT: 14.9 %
EST. GFR  (AFRICAN AMERICAN): >60 ML/MIN/1.73 M^2
EST. GFR  (NON AFRICAN AMERICAN): >60 ML/MIN/1.73 M^2
GLUCOSE SERPL-MCNC: 86 MG/DL
HCT VFR BLD AUTO: 36.9 %
HGB BLD-MCNC: 11.6 G/DL
IMM GRANULOCYTES # BLD AUTO: 0.01 K/UL
IMM GRANULOCYTES NFR BLD AUTO: 0.1 %
LYMPHOCYTES # BLD AUTO: 1.9 K/UL
LYMPHOCYTES NFR BLD: 26.1 %
MAGNESIUM SERPL-MCNC: 1.8 MG/DL
MCH RBC QN AUTO: 24.1 PG
MCHC RBC AUTO-ENTMCNC: 31.4 G/DL
MCV RBC AUTO: 77 FL
MONOCYTES # BLD AUTO: 0.6 K/UL
MONOCYTES NFR BLD: 7.8 %
NEUTROPHILS # BLD AUTO: 4.6 K/UL
NEUTROPHILS NFR BLD: 63.9 %
NRBC BLD-RTO: 0 /100 WBC
PLATELET # BLD AUTO: 243 K/UL
PMV BLD AUTO: 11.1 FL
POTASSIUM SERPL-SCNC: 4.8 MMOL/L
PROT SERPL-MCNC: 7.3 G/DL
RBC # BLD AUTO: 4.82 M/UL
SODIUM SERPL-SCNC: 142 MMOL/L
WBC # BLD AUTO: 7.14 K/UL

## 2019-01-02 PROCEDURE — 36415 COLL VENOUS BLD VENIPUNCTURE: CPT | Mod: PO

## 2019-01-02 PROCEDURE — 80053 COMPREHEN METABOLIC PANEL: CPT

## 2019-01-02 PROCEDURE — 80197 ASSAY OF TACROLIMUS: CPT

## 2019-01-02 PROCEDURE — 85025 COMPLETE CBC W/AUTO DIFF WBC: CPT

## 2019-01-02 PROCEDURE — 83735 ASSAY OF MAGNESIUM: CPT

## 2019-01-02 RX ORDER — GABAPENTIN 300 MG/1
CAPSULE ORAL
Qty: 120 CAPSULE | Refills: 0 | Status: SHIPPED | OUTPATIENT
Start: 2019-01-02 | End: 2019-02-27 | Stop reason: SDUPTHER

## 2019-01-02 NOTE — TELEPHONE ENCOUNTER
----- Message from Belgica Marquez sent at 1/2/2019  4:49 PM CST -----  Contact: [facility] [place]   Needs Advice    Reason for call:  Calling to give a accurate count done in lab      Communication Preference: [email or web address] [id number]    Additional Information: n/a

## 2019-01-03 LAB — TACROLIMUS BLD-MCNC: 5.6 NG/ML

## 2019-01-08 ENCOUNTER — TELEPHONE (OUTPATIENT)
Dept: TRANSPLANT | Facility: CLINIC | Age: 54
End: 2019-01-08

## 2019-01-08 DIAGNOSIS — D64.9 CHRONIC ANEMIA: Primary | ICD-10-CM

## 2019-01-08 NOTE — TELEPHONE ENCOUNTER
----- Message from Joselin Souza MD sent at 1/4/2019  7:55 PM CST -----  Labs relatively stable though patient has anemia.  Check ferritin and iron studies with next labs.  Repeat labs per routine.

## 2019-01-08 NOTE — TELEPHONE ENCOUNTER
Dr. Souza reviewed your labs.  Sent patient message to let her know labs are relatively stable, does have some anemia.  Will check iron studies with next labs.  Please get labs again on 01/28/19

## 2019-01-10 ENCOUNTER — HOSPITAL ENCOUNTER (OUTPATIENT)
Dept: RADIOLOGY | Facility: HOSPITAL | Age: 54
Discharge: HOME | End: 2019-01-10
Attending: PSYCHIATRY & NEUROLOGY
Payer: MEDICAID

## 2019-01-10 ENCOUNTER — OFFICE VISIT (OUTPATIENT)
Dept: NEUROLOGY | Facility: CLINIC | Age: 54
End: 2019-01-10
Payer: MEDICAID

## 2019-01-10 VITALS
BODY MASS INDEX: 33.71 KG/M2 | SYSTOLIC BLOOD PRESSURE: 110 MMHG | HEIGHT: 63 IN | DIASTOLIC BLOOD PRESSURE: 88 MMHG | WEIGHT: 190.25 LBS | HEART RATE: 78 BPM

## 2019-01-10 DIAGNOSIS — M75.02 ADHESIVE CAPSULITIS OF BOTH SHOULDERS: ICD-10-CM

## 2019-01-10 DIAGNOSIS — M25.552 PAIN OF LEFT HIP JOINT: Primary | ICD-10-CM

## 2019-01-10 DIAGNOSIS — M75.01 ADHESIVE CAPSULITIS OF BOTH SHOULDERS: ICD-10-CM

## 2019-01-10 DIAGNOSIS — M25.552 PAIN OF LEFT HIP JOINT: ICD-10-CM

## 2019-01-10 PROCEDURE — 99205 PR OFFICE/OUTPT VISIT, NEW, LEVL V, 60-74 MIN: ICD-10-PCS | Mod: S$PBB,,, | Performed by: PSYCHIATRY & NEUROLOGY

## 2019-01-10 PROCEDURE — 99999 PR PBB SHADOW E&M-EST. PATIENT-LVL IV: CPT | Mod: PBBFAC,,, | Performed by: PSYCHIATRY & NEUROLOGY

## 2019-01-10 PROCEDURE — 99214 OFFICE O/P EST MOD 30 MIN: CPT | Mod: PBBFAC,25 | Performed by: PSYCHIATRY & NEUROLOGY

## 2019-01-10 PROCEDURE — 73030 X-RAY EXAM OF SHOULDER: CPT | Mod: TC,50

## 2019-01-10 PROCEDURE — 73030 X-RAY EXAM OF SHOULDER: CPT | Mod: 26,50,, | Performed by: RADIOLOGY

## 2019-01-10 PROCEDURE — 73502 X-RAY EXAM HIP UNI 2-3 VIEWS: CPT | Mod: 26,LT,, | Performed by: RADIOLOGY

## 2019-01-10 PROCEDURE — 99999 PR PBB SHADOW E&M-EST. PATIENT-LVL IV: ICD-10-PCS | Mod: PBBFAC,,, | Performed by: PSYCHIATRY & NEUROLOGY

## 2019-01-10 PROCEDURE — 73030 XR SHOULDER COMPLETE 2 OR MORE VIEWS BILATERAL: ICD-10-PCS | Mod: 26,50,, | Performed by: RADIOLOGY

## 2019-01-10 PROCEDURE — 73502 X-RAY EXAM HIP UNI 2-3 VIEWS: CPT | Mod: TC,LT

## 2019-01-10 PROCEDURE — 73502 XR HIP 2 VIEW LEFT: ICD-10-PCS | Mod: 26,LT,, | Performed by: RADIOLOGY

## 2019-01-10 PROCEDURE — 99205 OFFICE O/P NEW HI 60 MIN: CPT | Mod: S$PBB,,, | Performed by: PSYCHIATRY & NEUROLOGY

## 2019-01-10 NOTE — PROGRESS NOTES
Consult Requested By: No att. providers found  Reason for Consult:   1. Left hip pain x 1 month.   2. Left shoulder pain and inability to raise up x 1 month.   3. Gait problem x 3 years.  4. Back pain x 4 years.    SUBJECTIVE:       HPI:   HISTORY OF PRESENT ILLNESS:  This is a 53-year-old right-handed pleasant lady,   presented in the clinic with the complaint of:  1.  Left hip pain for about a month.  2.  Left shoulder pain and inability to raise up of about a month.  3.  Gait problem three years.  4.  Back pain for about four years.    To elaborate her history, she said that she has had this back pain for four   years.  Evaluation has been done in the past by neurologist with MRI of the back   and EMG.  With this back pain, she had problem also walking because her right   leg is too  painful in time and that time, she got some treatment with gabapentin and   other medications.  She was doing okay until she got liver transplant on   12/26/2017.  After the transplant on her condition has gotten worse.  She could   not walk and it is unexplained.  She used to have problem walking, her legs used   to shake, her balance got worse, so she started having physical therapy.  After   physical therapy, she improved a lot.  Now, she is walking, but still she feels   shakiness in walking in both legs, but mainly the left one.  For a month, she   is having left hip pain.  It is located there and it is sometimes too bad and   makes her not to walk and she does not use because of the pain on her left leg.     She is now concerned about the left arm.  Her left shoulder hurts 24/7.  She   cannot raise the left shoulder above the shoulder level.  It brings on severe   pain at the short time.  Her neck also hurts from the pain and she cannot turn   her neck to the left.  Her right shoulder also showing some decreased range of   motion, but not bad in her opinion.  She said that currently her back pain is   not bad.  She is  recuperating from the impact of the surgery.  Her walking also   is getting improved.  Her PCP got the MRI of the lumbar spine done, which did   show some mild degenerative disk disease.        /milton [id number] roque(s)          AK/BEN  dd: 01/10/2019 08:42:00 (CST)  td: 01/11/2019 03:05:21 (CST)  Doc ID   #[id number]  Job ID #[id number]    CC:     This office note has been dictated.      Past Medical History:   Diagnosis Date    Alcohol abuse     Alcoholic hepatitis     Anemia of chronic disease     Coagulopathy     Encounter for blood transfusion     End stage liver disease     Hypertension     Hyponatremia     Liver cirrhosis     Liver transplant candidate 12/26/2017    Obesity (BMI 30-39.9) 1/5/2016     Past Surgical History:   Procedure Laterality Date    BREAST BIOPSY Left     benign    CHOLECYSTECTOMY      COLONOSCOPY N/A 12/5/2017    [facility]formed [facility] at [facility] (2ND FLR)    COLONOSCOPY N/A 12/1/2017    [facility] (2ND [facility])    ESOPHAGOGASTRODUODENOSCOPY (EGD) N/A 12/1/2017    [facility] (2ND [facility])    HYSTERECTOMY      complete     LIVER TRANSPLANT  12/2017    TRANSPLANT-LIVER N/A 12/26/2017    [facility]formed [facility]o [facility] at [facility] 2ND [facility]     Family History   Problem Relation Age of Onset    Hypertension Mother     Alzheimer's disease Mother     Hypertension Father     Diabetes Father     Diabetes Sister     Depression Maternal Grandfather     Breast cancer Paternal Aunt      Social History     Tobacco Use    Smoking status: Never Smoker    Smokeless tobacco: Never Used   Substance Use Topics    Alcohol use: No     Frequency: Never     Comment: Currently admitted to inpatient rehab 7/2017    Drug use: No     Review of patient's allergies indicates:   Allergen Reactions    Ferrous sulfate Other (See Comments)     Patient states the pill makes her sick. She stated she would rather have a shot       Review  of Systems   Constitutional: Negative for fever and weight loss.   HENT: Negative for hearing loss.    Eyes: Negative for blurred vision, double vision, photophobia and pain.   Respiratory: Negative for cough.    Cardiovascular: Negative for chest pain.   Gastrointestinal: Negative for abdominal pain, nausea and vomiting.   Genitourinary: Negative for dysuria, frequency and urgency.   Musculoskeletal: Positive for back pain, joint pain and neck pain. Negative for falls and myalgias.        Left hip   Skin: Negative for itching and rash.   Neurological: Negative for tingling and headaches.        Gait problem. Shakiness in walking .   Psychiatric/Behavioral: Negative for depression and memory loss.         OBJECTIVE:     Vital Signs (Most Recent)  Pulse: 78 (01/10/19 0756)  BP: 110/88 (01/10/19 0756)    Physical Exam   Constitutional: She is oriented to person, place, and time. She appears well-developed and well-nourished.   HENT:   Head: Normocephalic and atraumatic.   Eyes: Conjunctivae and EOM are normal. Pupils are equal, round, and reactive to light.   Neck: Normal range of motion. Neck supple. No JVD present. No tracheal deviation present. No thyromegaly present.   Cardiovascular: Normal rate, regular rhythm and normal heart sounds.   Pulmonary/Chest: Effort normal and breath sounds normal.   Abdominal: She exhibits no distension. There is no tenderness.   Musculoskeletal: She exhibits no edema.        Right shoulder: She exhibits decreased range of motion, tenderness and bony tenderness.        Left shoulder: She exhibits decreased range of motion, tenderness, bony tenderness, deformity, pain and spasm.   Neurological: She is alert and oriented to person, place, and time. She has normal strength and normal reflexes. She displays normal reflexes. No cranial nerve deficit or sensory deficit. She exhibits normal muscle tone. She displays a negative Romberg sign. Coordination and gait normal.   Reflex Scores:        Tricep reflexes are 2+ on the right side and 2+ on the left side.       Bicep reflexes are 2+ on the right side and 2+ on the left side.       Brachioradialis reflexes are 2+ on the right side and 2+ on the left side.       Patellar reflexes are 2+ on the right side and 2+ on the left side.       Achilles reflexes are 2+ on the right side and 2+ on the left side.  Skin: Skin is warm and dry. No rash noted.   Psychiatric: She has a normal mood and affect. Her behavior is normal. Judgment and thought content normal.       Strength  Deltoids Triceps Biceps Wrist Extension Wrist Flexion Hand    Upper: R 5/5 5/5 5/5 5/5 5/5 5/5    L 5/5 5/5 5/5 5/5 5/5 5/5     Iliopsoas Quadriceps Knee  Flexion Tibialis  anterior Gastro- cnemius EHL   Lower: R 5/5 5/5 5/5 5/5 5/5 5/5    L 5/5 5/5 5/5 5/5 5/5 5/5       Laboratory:  Lab Results   Component Value Date    WBC 7.14 01/02/2019    HGB 11.6 (L) 01/02/2019    HCT 36.9 (L) 01/02/2019     01/02/2019    CHOL 121 09/19/2018    TRIG 120 09/19/2018    HDL 38 (L) 09/19/2018    ALT 22 01/02/2019    AST 16 01/02/2019     01/02/2019    K 4.8 01/02/2019     01/02/2019    CREATININE 0.8 01/02/2019    BUN 22 (H) 01/02/2019    CO2 29 01/02/2019    TSH 1.051 09/19/2018    INR 1.0 03/09/2018    HGBA1C 4.2 03/01/2018             ASSESSMENT/PLAN:     Primary Diagnoses:  Problem List Items Addressed This Visit     Pain of left hip joint - Primary    Relevant Orders    X-Ray Shoulder 2 or More views Bilateral (Completed)    Ambulatory consult to Orthopedics    Adhesive capsulitis of both shoulders    Relevant Orders    X-Ray Shoulder 2 or More views Bilateral (Completed)    Ambulatory consult to Orthopedics            Patient Active Problem List   Diagnosis    Allergic rhinitis    Anemia of chronic disease    Erosive esophagitis    Essential hypertension    History of abnormal cervical Pap smear    History of Helicobacter pylori infection    Lipoma    Multinodular  thyroid    Hypomagnesemia    Liver replaced by transplant    Adrenal cortical steroids causing adverse effect in therapeutic use    At risk for opportunistic infections    Grade II hemorrhoids    Long-term use of immunosuppressant medication    Accelerated liver transplant rejection    Osteoarthritis of spine with radiculopathy, lumbar region    Vitamin D deficiency    Mixed hyperlipidemia        Plan: Refer to Orthopedic doctor.  Will see PRN.  Time spent: 60 minutes in face to face discussion concerning diagnosis, prognosis, review of lab and test results, benefits of treatment as well as management of disease, counseling of patient and coordination of care between various health care providers . Greater than half the time spent was used for coordination of care and counseling of patient. This note may have some spelling or wording mistakes which might have been overlooked during proof reading.

## 2019-01-23 ENCOUNTER — OFFICE VISIT (OUTPATIENT)
Dept: ORTHOPEDICS | Facility: CLINIC | Age: 54
End: 2019-01-23
Payer: MEDICAID

## 2019-01-23 ENCOUNTER — TELEPHONE (OUTPATIENT)
Dept: PHYSICAL MEDICINE AND REHAB | Facility: CLINIC | Age: 54
End: 2019-01-23

## 2019-01-23 VITALS
DIASTOLIC BLOOD PRESSURE: 79 MMHG | WEIGHT: 190 LBS | BODY MASS INDEX: 33.66 KG/M2 | HEIGHT: 63 IN | SYSTOLIC BLOOD PRESSURE: 126 MMHG | HEART RATE: 95 BPM

## 2019-01-23 DIAGNOSIS — M70.62 GREATER TROCHANTERIC BURSITIS, LEFT: ICD-10-CM

## 2019-01-23 DIAGNOSIS — M76.32 IT BAND SYNDROME, LEFT: ICD-10-CM

## 2019-01-23 DIAGNOSIS — E55.9 VITAMIN D DEFICIENCY: ICD-10-CM

## 2019-01-23 DIAGNOSIS — M70.61 GREATER TROCHANTERIC BURSITIS, RIGHT: ICD-10-CM

## 2019-01-23 DIAGNOSIS — M25.552 PAIN OF LEFT HIP JOINT: ICD-10-CM

## 2019-01-23 DIAGNOSIS — M25.552 LEFT HIP PAIN: Primary | ICD-10-CM

## 2019-01-23 DIAGNOSIS — G57.02 PIRIFORMIS SYNDROME, LEFT: ICD-10-CM

## 2019-01-23 DIAGNOSIS — G57.01 PIRIFORMIS SYNDROME, RIGHT: ICD-10-CM

## 2019-01-23 DIAGNOSIS — M53.3 SI (SACROILIAC) PAIN: Primary | ICD-10-CM

## 2019-01-23 DIAGNOSIS — M76.31 IT BAND SYNDROME, RIGHT: ICD-10-CM

## 2019-01-23 PROCEDURE — 99214 PR OFFICE/OUTPT VISIT, EST, LEVL IV, 30-39 MIN: ICD-10-PCS | Mod: S$PBB,,, | Performed by: ORTHOPAEDIC SURGERY

## 2019-01-23 PROCEDURE — 99214 OFFICE O/P EST MOD 30 MIN: CPT | Mod: S$PBB,,, | Performed by: ORTHOPAEDIC SURGERY

## 2019-01-23 PROCEDURE — 99999 PR PBB SHADOW E&M-EST. PATIENT-LVL IV: CPT | Mod: PBBFAC,,, | Performed by: ORTHOPAEDIC SURGERY

## 2019-01-23 PROCEDURE — 99999 PR PBB SHADOW E&M-EST. PATIENT-LVL IV: ICD-10-PCS | Mod: PBBFAC,,, | Performed by: ORTHOPAEDIC SURGERY

## 2019-01-23 PROCEDURE — 99214 OFFICE O/P EST MOD 30 MIN: CPT | Mod: PBBFAC,PN | Performed by: ORTHOPAEDIC SURGERY

## 2019-01-23 RX ORDER — METHYLPREDNISOLONE 4 MG/1
TABLET ORAL
Qty: 1 PACKAGE | Refills: 0 | Status: SHIPPED | OUTPATIENT
Start: 2019-01-23 | End: 2019-02-05

## 2019-01-23 NOTE — PROGRESS NOTES
Subjective:     Patient ID: Marcie Bazan is a 53 y.o. female.    Chief Complaint: Pain of the Left Hip    Consult from Dr. Lange, will receive report electronically    Patient is here for left hip pain. States it has been bothering her for 3 months. Reports no JEMAL. Reports no previous injury.      Hip Pain    The pain is present in the left hip. This is a new problem. The current episode started more than 1 month ago. The problem occurs constantly. The problem has been gradually worsening. The quality of the pain is described as aching and sharp. The pain is at a severity of 8/10. Associated symptoms include stiffness. Pertinent negatives include no fever or numbness. The symptoms are aggravated by walking, standing, sitting, bearing weight and activity. She has tried cold, heat and NSAIDs for the symptoms. The treatment provided mild relief. Physical therapy was not tried.      Past Medical History:   Diagnosis Date    Alcohol abuse     Alcoholic hepatitis     Anemia of chronic disease     Coagulopathy     Encounter for blood transfusion     End stage liver disease     Hypertension     Hyponatremia     Liver cirrhosis     Liver transplant candidate 12/26/2017    Obesity (BMI 30-39.9) 1/5/2016     Past Surgical History:   Procedure Laterality Date    BREAST BIOPSY Left     benign    CHOLECYSTECTOMY      COLONOSCOPY N/A 12/5/2017    [facility] (2ND FLR)    COLONOSCOPY N/A 12/1/2017    [facility] (2ND FLR)    ESOPHAGOGASTRODUODENOSCOPY (EGD) N/A 12/1/2017    [facility] (2ND FLR)    HYSTERECTOMY      complete     LIVER TRANSPLANT  12/2017    TRANSPLANT-LIVER N/A 12/26/2017    [facility] at [facility] OR 2ND FLR     Family History   Problem Relation Age of Onset    Hypertension Mother     Alzheimer's disease Mother     Hypertension Father     Diabetes Father     Diabetes Sister      Depression Maternal Grandfather     Breast cancer Paternal Aunt      Social History     Socioeconomic History    Marital status: Single     Spouse name: Not on file    Number of children: 2    Years of education: Not on file    Highest education level: Not on file   Social Needs    Financial resource strain: Not on file    Food insecurity - worry: Not on file    Food insecurity - inability: Not on file    Transportation needs - medical: Not on file    Transportation needs - non-medical: Not on file   Occupational History    Not on file   Tobacco Use    Smoking status: Never Smoker    Smokeless tobacco: Never Used   Substance and Sexual Activity    Alcohol use: No     Frequency: Never     Comment: Currently admitted to inpatient rehab 7/2017    Drug use: No    Sexual activity: Not Currently   Other Topics Concern    Not on file   Social History Narrative    Not on file        Medication List           Accurate as of 1/23/19  8:18 AM. If you have any questions, ask your nurse or doctor.               CONTINUE taking these medications    atorvastatin 40 MG tablet  Commonly known as:  LIPITOR  Take 1 tablet (40 mg total) by mouth once daily.     ergocalciferol 50,000 unit Cap  Commonly known as:  ERGOCALCIFEROL  TAKE 1 CAPSULE BY MOUTH EVERY 7 DAYS     furosemide 40 MG tablet  Commonly known as:  LASIX  TAKE 1 TABLET(40 MG) BY MOUTH EVERY DAY     * gabapentin 600 MG tablet  Commonly known as:  NEURONTIN     * gabapentin 300 MG capsule  Commonly known as:  NEURONTIN  TAKE 2 CAPSULES(600 MG) BY MOUTH TWICE DAILY     levocetirizine 5 MG tablet  Commonly known as:  XYZAL  Take 1 tablet (5 mg total) by mouth every evening.     magnesium oxide 400 mg (241.3 mg magnesium) tablet  Commonly known as:  MAG-OX  Take 3 tablets (1,200 mg total) by mouth 3 (three) times daily.     methocarbamol 750 MG Tab  Commonly known as:  ROBAXIN  Take 1 tablet (750 mg total) by mouth 3 (three) times daily as needed (muscle  spasms).     mycophenolate 250 mg Cap  Commonly known as:  CELLCEPT  Take 2 capsules (500 mg total) by mouth 2 (two) times daily.     NIFEdipine 60 MG (OSM) 24 hr tablet  Commonly known as:  PROCARDIA-XL  Take 1 tablet (60 mg total) by mouth once daily.     tacrolimus 1 MG Cap  Commonly known as:  PROGRAF  Take 2 capsules (2 mg total) by mouth every 12 (twelve) hours.     traZODone 50 MG tablet  Commonly known as:  DESYREL  Take 0.5 tablets (25 mg total) by mouth every evening.         * This list has 2 medication(s) that are the same as other medications prescribed for you. Read the directions carefully, and ask your doctor or other care provider to review them with you.              Review of patient's allergies indicates:   Allergen Reactions    Ferrous sulfate Other (See Comments)     Patient states the pill makes her sick. She stated she would rather have a shot     Review of Systems   Constitution: Negative for fever.   HENT: Negative for hearing loss.    Eyes: Negative for blurred vision.   Cardiovascular: Negative for chest pain and leg swelling.   Respiratory: Positive for shortness of breath.    Endocrine: Negative for polyuria.   Hematologic/Lymphatic: Negative for bleeding problem.   Skin: Negative for rash.   Musculoskeletal: Positive for back pain, joint pain, muscle cramps, muscle weakness and stiffness. Negative for joint swelling.   Gastrointestinal: Negative for melena.   Genitourinary: Negative for hematuria.   Neurological: Negative for loss of balance, numbness and paresthesias.   Psychiatric/Behavioral: Negative for altered mental status.       Objective:   Body mass index is 33.66 kg/m².  Vitals:    01/23/19 0755   BP: 126/79   Pulse: 95       General: Marcie is well-developed, well-nourished, appears stated age, in no acute distress, alert and oriented to time, place and person.       General    Vitals reviewed.  Constitutional: She is oriented to person, place, and time. She appears  well-developed and well-nourished. No distress.   HENT:   Mouth/Throat: No oropharyngeal exudate.   Eyes: Right eye exhibits no discharge. Left eye exhibits no discharge.   Neck: Normal range of motion.   Cardiovascular: Normal rate.    Pulmonary/Chest: Effort normal and breath sounds normal. No respiratory distress.   Neurological: She is alert and oriented to person, place, and time. She has normal reflexes. No cranial nerve deficit. Coordination normal.   Psychiatric: She has a normal mood and affect. Her behavior is normal. Judgment and thought content normal.     General Musculoskeletal Exam   Gait: antalgic   Pelvic Obliquity: none      Right Knee Exam     Inspection   Alignment:  normal  Effusion: absent    Left Knee Exam     Inspection   Alignment:  normal  Effusion: absent    Right Hip Exam     Inspection   Scars: absent  Swelling: absent  Bruising: absent  No deformity of hip.  Quadriceps Atrophy:  Negative  Erythema: absent    Tenderness   The patient tender to palpation of the piriformis, trochanteric bursa and SI joint.    Range of Motion   Abduction:  40 normal   Adduction:  20 normal   Extension:  0 normal   Flexion:  110 normal   External rotation:  50 normal   Internal rotation:  30 normal     Tests   Pain w/ forced internal rotation (CIERA): present  Pain w/ forced external rotation (FADIR): absent  Winnie: positive  Trendelenburg Test: negative  Log Roll: negative    Other   Sensation: normal    Comments:  Tenderness noted over the IT Band.  Left Hip Exam     Inspection   Scars: absent  Swelling: absent  No deformity of hip.  Quadriceps Atrophy:  negative  Erythema: absent  Bruising: absent    Tenderness   The patient tender to palpation of the piriformis, trochanteric bursa and SI joint.    Range of Motion   Abduction:  40 normal   Adduction:  20 normal   Extension:  0 normal   Flexion:  110 normal   External rotation:  50 normal   Internal rotation: 30 normal     Tests   Pain w/ forced internal  rotation (CIERA): present  Pain w/ forced external rotation (FADIR): absent  Winnie: positive  Trendelenburg Test: negative  Log Roll: negative    Other   Sensation: normal    Comments:  Tenderness noted over the IT Band.      Back (L-Spine & T-Spine) / Neck (C-Spine) Exam   Back exam is normal.      Muscle Strength   Right Lower Extremity   Hip Abduction: 5/5   Hip Adduction: 5/5   Hip Flexion: 5/5   Left Lower Extremity   Hip Abduction: 5/5   Hip Adduction: 5/5   Hip Flexion: 5/5     Reflexes     Left Side  Quadriceps:  2+  Achilles:  2+    Right Side   Quadriceps:  2+  Achilles:  2+    Vascular Exam     Right Pulses  Dorsalis Pedis:      2+  Posterior Tibial:      2+        Left Pulses  Dorsalis Pedis:      2+  Posterior Tibial:      2+        Capillary Refill  Right Hand: normal capillary refill  Left Hand: normal capillary refill    Edema  Right Upper Leg: absent  Left Upper Leg: absent     X-ray is taken and reviewed of the hips today; this is an AP pelvis and lateral    There is no evidence of fracture or dislocation.  There is noted to be some   mild arthritic findings in the left hip      Assessment:     Encounter Diagnoses   Name Primary?    SI (sacroiliac) pain Yes    Piriformis syndrome, right     Piriformis syndrome, left     Greater trochanteric bursitis, left     Greater trochanteric bursitis, right     It band syndrome, left     It band syndrome, right     Pain of left hip joint         Plan:     Reviewed images and findings with patient. She has generalized inflammation around the hip joint.  She has taken a Medrol Dosepak since her liver transplant and has tolerated that well before.  We will send 1 in for her.  I will get her set up with physical therapy, physiatry, interventional pain management for evaluation for injections    Follow-up in nonop Ortho in 3 months

## 2019-01-23 NOTE — LETTER
January 23, 2019      Nhi Lange MD  [address]  [place] [address]           [facility]  [address]  [place] [address]  Phone: [phone]  Fax: [phone]          Patient: Marcie Bazan   MR Number: [id number]   YOB: 1965   Date of Visit: 1/23/2019       Dear Dr. Nhi Lange:    Thank you for referring Marcie Bazan to me for evaluation. Attached you will find relevant portions of my assessment and plan of care.    If you have questions, please do not hesitate to call me. I look forward to following Marcie Bazan along with you.    Sincerely,    Antonio Horan MD    Enclosure  CC:  No Recipients    If you would like to receive this communication electronically, please contact [email or web address] or [phone] to request more information on [facility] Link access.    For providers and/or their staff who would like to refer a patient to [facility], please contact us through our one-stop-shop provider referral line, [facility], at [phone].    If you feel you have received this communication in error or would no longer like to receive these types of communications, please e-mail [email or web address]

## 2019-01-24 RX ORDER — ERGOCALCIFEROL 1.25 MG/1
CAPSULE ORAL
Qty: 10 CAPSULE | Refills: 0 | Status: SHIPPED | OUTPATIENT
Start: 2019-01-24 | End: 2019-04-18 | Stop reason: SDUPTHER

## 2019-01-28 ENCOUNTER — LAB VISIT (OUTPATIENT)
Dept: LAB | Facility: HOSPITAL | Age: 54
End: 2019-01-28
Attending: INTERNAL MEDICINE
Payer: MEDICAID

## 2019-01-28 DIAGNOSIS — Z94.4 LIVER REPLACED BY TRANSPLANT: ICD-10-CM

## 2019-01-28 LAB
ALBUMIN SERPL BCP-MCNC: 3.9 G/DL
ALP SERPL-CCNC: 217 U/L
ALT SERPL W/O P-5'-P-CCNC: 26 U/L
ANION GAP SERPL CALC-SCNC: 9 MMOL/L
AST SERPL-CCNC: 17 U/L
BASOPHILS # BLD AUTO: 0.04 K/UL
BASOPHILS NFR BLD: 0.4 %
BILIRUB SERPL-MCNC: 0.4 MG/DL
BUN SERPL-MCNC: 17 MG/DL
CALCIUM SERPL-MCNC: 10.1 MG/DL
CHLORIDE SERPL-SCNC: 106 MMOL/L
CO2 SERPL-SCNC: 26 MMOL/L
CREAT SERPL-MCNC: 0.8 MG/DL
DIFFERENTIAL METHOD: ABNORMAL
EOSINOPHIL # BLD AUTO: 0.2 K/UL
EOSINOPHIL NFR BLD: 2.4 %
ERYTHROCYTE [DISTWIDTH] IN BLOOD BY AUTOMATED COUNT: 16.1 %
EST. GFR  (AFRICAN AMERICAN): >60 ML/MIN/1.73 M^2
EST. GFR  (NON AFRICAN AMERICAN): >60 ML/MIN/1.73 M^2
GLUCOSE SERPL-MCNC: 94 MG/DL
HCT VFR BLD AUTO: 38.9 %
HGB BLD-MCNC: 12 G/DL
IMM GRANULOCYTES # BLD AUTO: 0.04 K/UL
IMM GRANULOCYTES NFR BLD AUTO: 0.4 %
LYMPHOCYTES # BLD AUTO: 1.7 K/UL
LYMPHOCYTES NFR BLD: 17 %
MAGNESIUM SERPL-MCNC: 1.6 MG/DL
MCH RBC QN AUTO: 24.1 PG
MCHC RBC AUTO-ENTMCNC: 30.8 G/DL
MCV RBC AUTO: 78 FL
MONOCYTES # BLD AUTO: 0.6 K/UL
MONOCYTES NFR BLD: 6.3 %
NEUTROPHILS # BLD AUTO: 7.4 K/UL
NEUTROPHILS NFR BLD: 73.5 %
NRBC BLD-RTO: 0 /100 WBC
PLATELET # BLD AUTO: 245 K/UL
PMV BLD AUTO: 10.9 FL
POTASSIUM SERPL-SCNC: 3.6 MMOL/L
PROT SERPL-MCNC: 7.7 G/DL
RBC # BLD AUTO: 4.98 M/UL
SODIUM SERPL-SCNC: 141 MMOL/L
WBC # BLD AUTO: 10.09 K/UL

## 2019-01-28 PROCEDURE — 36415 COLL VENOUS BLD VENIPUNCTURE: CPT

## 2019-01-28 PROCEDURE — 80197 ASSAY OF TACROLIMUS: CPT

## 2019-01-28 PROCEDURE — 83735 ASSAY OF MAGNESIUM: CPT

## 2019-01-28 PROCEDURE — 80053 COMPREHEN METABOLIC PANEL: CPT

## 2019-01-28 PROCEDURE — 85025 COMPLETE CBC W/AUTO DIFF WBC: CPT

## 2019-01-29 LAB — TACROLIMUS BLD-MCNC: 4.3 NG/ML

## 2019-01-30 ENCOUNTER — TELEPHONE (OUTPATIENT)
Dept: TRANSPLANT | Facility: CLINIC | Age: 54
End: 2019-01-30

## 2019-01-30 ENCOUNTER — OFFICE VISIT (OUTPATIENT)
Dept: PAIN MEDICINE | Facility: CLINIC | Age: 54
End: 2019-01-30
Payer: MEDICAID

## 2019-01-30 VITALS
HEART RATE: 91 BPM | BODY MASS INDEX: 33.68 KG/M2 | HEIGHT: 63 IN | WEIGHT: 190.06 LBS | SYSTOLIC BLOOD PRESSURE: 141 MMHG | DIASTOLIC BLOOD PRESSURE: 77 MMHG | RESPIRATION RATE: 16 BRPM

## 2019-01-30 DIAGNOSIS — M25.552 LEFT HIP PAIN: Primary | ICD-10-CM

## 2019-01-30 DIAGNOSIS — M51.36 DEGENERATIVE DISC DISEASE, LUMBAR: ICD-10-CM

## 2019-01-30 PROCEDURE — 99999 PR PBB SHADOW E&M-EST. PATIENT-LVL III: ICD-10-PCS | Mod: PBBFAC,,, | Performed by: PAIN MEDICINE

## 2019-01-30 PROCEDURE — 99213 OFFICE O/P EST LOW 20 MIN: CPT | Mod: PBBFAC,PN | Performed by: PAIN MEDICINE

## 2019-01-30 PROCEDURE — 99999 PR PBB SHADOW E&M-EST. PATIENT-LVL III: CPT | Mod: PBBFAC,,, | Performed by: PAIN MEDICINE

## 2019-01-30 PROCEDURE — 99213 PR OFFICE/OUTPT VISIT, EST, LEVL III, 20-29 MIN: ICD-10-PCS | Mod: S$PBB,,, | Performed by: PAIN MEDICINE

## 2019-01-30 PROCEDURE — 99213 OFFICE O/P EST LOW 20 MIN: CPT | Mod: S$PBB,,, | Performed by: PAIN MEDICINE

## 2019-01-30 RX ORDER — METHOCARBAMOL 750 MG/1
750 TABLET, FILM COATED ORAL 3 TIMES DAILY PRN
Qty: 60 TABLET | Refills: 3 | Status: SHIPPED | OUTPATIENT
Start: 2019-01-30 | End: 2019-03-18 | Stop reason: SDUPTHER

## 2019-01-30 NOTE — TELEPHONE ENCOUNTER
Dr. Souza reviewed your labs.  Sent patient a message to let her know that labs are stable, on changes needed.  Please repeat your labs on 02/25/19

## 2019-01-30 NOTE — TELEPHONE ENCOUNTER
----- Message from Joselin Souza MD sent at 1/30/2019  7:56 AM CST -----  Reviewed, nothing to do; repeat per routine

## 2019-01-30 NOTE — LETTER
January 30, 2019      Antonio Horan MD  [address]  [place] [address]           [facility]  [address] [address]  Phone: [phone]  Fax: [phone]          Patient: Marcie Bazan   MR Number: [id number]   YOB: 1965   Date of Visit: 1/30/2019       Dear Dr. Antonio Horan:    Thank you for referring Marcie Bazan to me for evaluation. Attached you will find relevant portions of my assessment and plan of care.    If you have questions, please do not hesitate to call me. I look forward to following Marcie Bazan along with you.    Sincerely,    David Desai MD    Enclosure  CC:  No Recipients    If you would like to receive this communication electronically, please contact [email or web address] or [phone] to request more information on [facility] Link access.    For providers and/or their staff who would like to refer a patient to [facility], please contact us through our one-stop-shop provider referral line, [facility], at [phone].    If you feel you have received this communication in error or would no longer like to receive these types of communications, please e-mail [email or web address]

## 2019-01-30 NOTE — PROGRESS NOTES
Chief Pain Complaint:  Hip Pain (left hip pain )    History of Present Illness:   Ms. Bazan returns for followup.  She reports that she has left hip pain which has been bothering her for approximately 1.5 months.  There is no inciting event she states that this started gradually and has progressively gotten worse.  She describes currently a grinding sensation located in the left hip which is worse with activity including things as simple as rolling over in bed.  She has been recently seen by Orthopedics and was prescribed a Medrol Dosepak which she is currently taking and reports that his provided no benefit.  She denies having numbness and weakness in her leg she denies having bowel bladder difficulties.  Currently her pain is rated 8/10.  She has obtained bilateral hip x-rays which do not show any degenerative changes.    Initial HPI:  This patient is a 53 y.o. female who presents today complaining of the above noted pain/s. The patient describes the pain as follows.  Ms. Bazan has a history of hypertension, liver transplant, lumbar spondylosis who presents to clinic with complaints of right-sided cervical pain and lumbar pain which radiates into bilateral legs.  She has been having these symptoms since December 2017.  Currently she rates her pain as a 9/10 and describes the low back pain radiating leg pain as constant burning while the neck pain is described as a shocking type of pain and radiates from the low cervical spine superiorly.  The radiating pain down right leg does not go into the foot and travels in the lateral aspect of the leg and crosses medially across the knee in the L4 distribution.  She endorses bilateral lower extremity weakness.  Denies radiation of cervical pain into the arms.  She is currently working with physical therapy and has been since she underwent liver transplant in December 2017.  She denies bowel or bladder changes.    Previous Therapy:  Medications:  Gabapentin,  Robaxin  Injections:  None  Surgeries:  None  Physical Therapy:  Has been participating since December 2017    Past Surgical History:   Procedure Laterality Date    BREAST BIOPSY Left     benign    CHOLECYSTECTOMY      COLONOSCOPY N/A 12/5/2017    Performed by [facility]ira MD at [facility] (2ND FLR)    COLONOSCOPY N/A 12/1/2017    Performed by Filemon Fuentes [facility] at [facility] END[facility] (2ND FLR)    ESOPHAGOGASTRODUODENOSCOPY (EGD) N/A 12/1/2017    Performed by [facility] at [facility] END[facility] (2ND FLR)    HYSTERECTOMY      complete     LIVER TRANSPLANT  12/2017    TRANSPLANT-LIVER N/A 12/26/2017    Performed by Romeo Moore MD at [facility] OR 2ND FLR     Imaging / Labs / Studies (reviewed on 1/30/2019):    Left Hip Xray:   EXAMINATION:  XR HIP 2 VIEW LEFT    CLINICAL HISTORY:  Adhesive capsulitis of right shoulder    TECHNIQUE:  AP view of the pelvis and frog leg lateral view of the left hip were performed.    COMPARISON:  None    FINDINGS:  There is relative preservation of the hip joint spaces.  No fracture or dislocation is seen.  No collapse of the femoral heads.  Sacroiliac joints remain intact.  Pubic symphysis demonstrates a normal appearance      Impression       As above     Results for orders placed during the hospital encounter of 09/15/15   CT Lumbar Spine Without Contrast    Narrative CT of lumbar spine    History: Back pain    Technique: Standard lumbar spine CT protocol was performed without IV contrast.    Finding: Vertebral body heights and alignment are within normal limits.  Mild narrowing at L5-S1 intervertebral disc space with mild central disc bulge.  There is a moderate circumferential bulge at the L4/L5 level effacing the thecal sac.  Remaining   intervertebral discs are within normal limits.  Prevertebral soft tissues are normal.  Visualized abdominal organs are unremarkable.    Impression      Mild degenerative change with disc bulges at L4-L5 and L5-S1.   Lumbar MRI 10/15/18:  Narrative      EXAMINATION:  MRI LUMBAR SPINE WITHOUT CONTRAST    CLINICAL HISTORY:  Low back pain, >6wks conservative tx, persistent-progressive sx, surgical candidate; Radiculopathy, lumbar region    TECHNIQUE:  Multiplanar, multisequence MR images were acquired from the thoracolumbar junction to the sacrum without the administration of contrast.    COMPARISON:  None.    FINDINGS:  Vertebral body heights and alignment are maintained.  No concerning marrow signal abnormality.  L4 vertebral body hemangioma present.  There is mild disc height loss at L5-S1.  Conus terminates normally.    Intra-abdominal/pelvic visualized structures are unremarkable.    L1-L2: No spinal canal stenosis or neural foraminal narrowing.    L2-L3: No spinal canal stenosis or neural foraminal narrowing.    L3-L4: Mild circumferential disc bulge present.  Facet/ligamentum flavum hypertrophy noted.  Mild bilateral inferior neural foraminal narrowing present.  Mild central spinal canal stenosis present.    L4-L5: Mild circumferential disc bulging present.  Facet ligamentum flavum hypertrophy noted.  Mild spinal canal stenosis with mild left greater than right inferior neural foraminal narrowing.    L5-S1: No significant posterior disc bulge or spinal canal stenosis.  Facet degenerative hypertrophy present with mild left-sided neural foraminal narrowing.      Impression       Mild degenerative changes as above without high-grade spinal canal stenosis or neural foraminal narrowing.     C spine Xray 10/3/18:  Narrative     EXAMINATION:  XR CERVICAL SPINE 5 VIEW WITH FLEX AND EXT    CLINICAL HISTORY:  neck pain;  Radiculopathy, lumbar region    TECHNIQUE:  Five views of the cervical spine plus flexion and extension views were performed.    COMPARISON:  None    FINDINGS:  There is some straightening of the normal cervical lordosis.  Minimal retrolisthesis of C5 on C6 noted.  Vertebral body heights are within normal limits.  No change in spinal alignment with  "flexion or extension to suggest instability.  There is mild disc height loss at C5-6 and C6-7 with associated degenerative endplate spurring.  Posterior elements appear intact without acute fractures or subluxations demonstrated.  Odontoid process appears intact.  Atlantoaxial articulations appear normal.  Prevertebral soft tissues are within normal limits.      Impression       Degenerative changes as above.  No acute findings.     Review of Systems:  CONSTITUTIONAL: patient denies any fever, chills, or weight loss  SKIN: patient denies any rash or itching  RESPIRATORY: patient denies having any shortness of breath  GASTROINTESTINAL: patient reports having stool incontinence with some leakage  GENITOURINARY: patient denies having any abnormal bladder function    MUSCULOSKELETAL:  - patient complains of the above noted pain/s (see chief pain complaint)    NEUROLOGICAL:   - pain as above  - strength in Lower extremities is intact, BILATERALLY  - sensation in Lower extremities is intact, BILATERALLY  - patient reports having stool incontinence     PSYCHIATRIC: patient denies any change in mood    Other:  All other systems reviewed and are negative    Physical Exam:  BP (!) 141/77 (BP Location: Left arm, Patient Position: Sitting)   Pulse 91   Resp 16   Ht 5' 3" (1.6 m)   Wt 86.2 kg (190 lb 0.6 oz)   LMP 01/01/1985 (Approximate) Comment: 1985  BMI 33.66 kg/m²  (reviewed on 1/30/2019  General:  Alert and oriented, No acute distress.    HEENT:       EOMI.  Normocephalic, atraumatic.   Cardiovascular:  Regular rate.    Gastrointestinal:  Soft.    Respiratory:  Respirations are non-labored.    Cervical Spine:  No masses or atrophy,  Thoracic Spine:  Palpation normal.    Lumbar Spine:  No masses or atrophy,   Sensory Exam:  Full and equal sensation to light touch throughout  Hip Exam:   Logroll - negative  ROM (supine)- flexion normal    IR- increased pain        ER- increased pain  Stinchfield (resisted supine hip " flexion) - positive on the left  Reflexes   Left DTR's      Brachioradialis 2  Biceps 2  Triceps 2  Right DTR's    Brachioradialis 2  Biceps 2  Triceps 2  Neurologic:  Cranial Nerves II-XII are grossly intact.    Pathologic reflexes:            Clonus - Neg   Psychiatric:  Cooperative.    Gait:  Slow, antalgic  Assessment  Lumbar Radiculopathy  Cervical Spondylosis   Lumbar spondylosis  Left hip pain    1. 53 y.o. year old patient with PMH of   Past Medical History:   Diagnosis Date    Alcohol abuse     Alcoholic hepatitis     Anemia of chronic disease     Coagulopathy     Encounter for blood transfusion     End stage liver disease     Hypertension     Hyponatremia     Liver cirrhosis     Liver transplant candidate 12/26/2017    Obesity (BMI 30-39.9) 1/5/2016      presenting with pain located in cervical and lumbar spine, bilateral lower extremities,  Left thumb  2. Pain Generators / Etiology :  Cervical spondylosis, lumbar spondylosis, lumbar radiculopathy, left hip pain  3. Failed Meds (E- Effective, NE- Not Effective):  Gabapentin-E, Robaxin-effective  4. Physical Therapy - has been participating since December 2017  5. Psychological comorbidities -  none  6. Anticoagulants / Antiplatelets:  None     PLAN:  1. Medications continue gabapentin 600 mg 3 times daily, provided refill on Robaxin 750 mg 3 times daily p.r.n.  2. PT - has referral for physical therapy which she will start in the next few weeks  3. Psychological - none  4. Labs - obtain  none; review labs from 09/24/2018, creatinine 0.9  5. Imaging - none; reviewed hip x-rays the patient today in clinic  6. Interventions - none; consider left hip injection in the future  7. Referrals - none  8. Records - none  9. Follow up visit - follow up in clinic in 8 weeks  10. Patient Questions - answered all the patient's questions regarding diagnosis, therapy, treatment    KATJA Desai MD  Interventional Pain  [facility] [place]

## 2019-01-30 NOTE — PATIENT INSTRUCTIONS
-will start physical therapy in a few weeks  -continue gabapentin 600 mg 3 times daily  -refilled Robaxin 750 mg 3 times daily as needed  -follow up in clinic in 8 weeks

## 2019-02-05 ENCOUNTER — OFFICE VISIT (OUTPATIENT)
Dept: PHYSICAL MEDICINE AND REHAB | Facility: CLINIC | Age: 54
End: 2019-02-05
Payer: MEDICAID

## 2019-02-05 VITALS
HEIGHT: 63 IN | BODY MASS INDEX: 33.66 KG/M2 | RESPIRATION RATE: 14 BRPM | SYSTOLIC BLOOD PRESSURE: 132 MMHG | HEART RATE: 83 BPM | WEIGHT: 190 LBS | DIASTOLIC BLOOD PRESSURE: 81 MMHG

## 2019-02-05 DIAGNOSIS — G57.03 PIRIFORMIS SYNDROME OF BOTH SIDES: ICD-10-CM

## 2019-02-05 DIAGNOSIS — R29.898 WEAKNESS OF BOTH HIPS: ICD-10-CM

## 2019-02-05 DIAGNOSIS — M53.3 SI (SACROILIAC) PAIN: Primary | ICD-10-CM

## 2019-02-05 DIAGNOSIS — M47.816 SPONDYLOSIS OF LUMBAR REGION WITHOUT MYELOPATHY OR RADICULOPATHY: ICD-10-CM

## 2019-02-05 DIAGNOSIS — E66.9 OBESITY (BMI 30.0-34.9): ICD-10-CM

## 2019-02-05 DIAGNOSIS — M70.61 GREATER TROCHANTERIC BURSITIS OF BOTH HIPS: ICD-10-CM

## 2019-02-05 DIAGNOSIS — M70.62 GREATER TROCHANTERIC BURSITIS OF BOTH HIPS: ICD-10-CM

## 2019-02-05 PROBLEM — E66.811 OBESITY (BMI 30.0-34.9): Status: ACTIVE | Noted: 2019-02-05

## 2019-02-05 PROCEDURE — 99213 OFFICE O/P EST LOW 20 MIN: CPT | Mod: PBBFAC,PN | Performed by: PHYSICAL MEDICINE & REHABILITATION

## 2019-02-05 PROCEDURE — 99999 PR PBB SHADOW E&M-EST. PATIENT-LVL III: ICD-10-PCS | Mod: PBBFAC,,, | Performed by: PHYSICAL MEDICINE & REHABILITATION

## 2019-02-05 PROCEDURE — 99999 PR PBB SHADOW E&M-EST. PATIENT-LVL III: CPT | Mod: PBBFAC,,, | Performed by: PHYSICAL MEDICINE & REHABILITATION

## 2019-02-05 PROCEDURE — 99204 OFFICE O/P NEW MOD 45 MIN: CPT | Mod: S$PBB,,, | Performed by: PHYSICAL MEDICINE & REHABILITATION

## 2019-02-05 PROCEDURE — 99204 PR OFFICE/OUTPT VISIT, NEW, LEVL IV, 45-59 MIN: ICD-10-PCS | Mod: S$PBB,,, | Performed by: PHYSICAL MEDICINE & REHABILITATION

## 2019-02-05 NOTE — PATIENT INSTRUCTIONS
Weight Management: Exercise and Activity    Studies show that people who exercise are the most likely to lose weight and keep it off. Exercise burns calories. It helps build muscle to make your body stronger. Make exercise an important part of your weight-management plan.  Make activity part of your day  You may not think you have the time to exercise. But you can work activity into your daily life--you just need to be committed. Take 10 minutes out of your lunch hour to take a walk. Walk to the [employer] to get your paper instead of having it delivered. Make it a habit to take the stairs instead of the elevator. Park in a far away parking spot instead of the closest. Youll be surprised at how fast these little changes can make a difference.  Some people really cannot walk very far, and tire out quickly with exercise. Instead of becoming discouraged, resolve to do what you can do, and work to make that a regular frequent habit.   The benefits of exercise  Exercise offers many benefits including:   · Exercise increases your metabolism (the speed at which your body burns calories).  · Regular exercise can increase the amount of muscle in your body. Muscle burns calories faster than fat. The more muscle you have, the more calories you burn.  · Exercise gives you energy and curbs your appetite.  · Exercise decreases stress and helps you sleep better.  Make exercise fun  Exercise can be fun. Choose an activity you enjoy. You may even get a friend to do it with you:  · Take a resistance-training or aerobics class  · Join a team sport  · Take a dance class  · Walk the dog  · Ride a bike  If you have health problems, be sure to ask your healthcare provider before you start an exercise program. Have a  help you develop a plan thats safe for you.   Date Last Reviewed: 2/4/2016  © 1563-8181 [employer]. [address] [place] [address], [place] [address]. All rights reserved. This information is not  intended as a substitute for professional medical care. Always follow your healthcare professional's instructions.        Weight Management: Fact and Fiction    Knowing the truth about losing weight can help you separate what works from what doesnt. Dont be taken in by expensive weight-loss fads like pills, herbs, and special foods that promise unbelievable results. Theres no magic way to lose weight. If you have questions about weight loss, ask your healthcare provider.  Fiction: The faster I lose weight, the better.  Fact: Rapid weight loss is usually due to loss of water or muscle mass. What youre trying to get rid of is extra fat. Aim to lose a 1/2 pound to 2 pounds a week. Then youre more likely to lose fat rather than water or muscle.  Fiction: Skipping meals will help me lose weight.  Fact: When you skip meals, you dont give your body the energy it needs to work. Hunger makes you more likely to overeat later on. Its best to spread your meals throughout the day. Eat at least three meals a day.  Fiction: I cant start exercising until I lose weight.  Fact: The sooner you start exercising the better. Exercise helps burn more calories, tone your muscles, and keep your appetite in check. People who continue to exercise after they lose weight are more likely to keep the weight off.  Fiction: The fewer calories I eat, the better.  Fact: This seems like it should be true, but its not. When you eat too few calories, your body acts as if its on a desert island. It thinks food is scarce, so it slows down your metabolism (how fast you burn calories) to save energy. By eating too few calories, you make it harder to lose weight.  Fiction: Once I lose weight, I can go back to living the way I did before.  Fact: Going back to your old eating habits and giving up exercise is a sure way to regain any weight youve lost. The lifestyle changes that help you lose extra weight can also help keep it off. This is  why you need to make realistic changes you can stick with.  Fiction: Low-fat and fat-free mean low-calorie.  Fact: All foods, even fat-free ones, have calories. Eat too many calories and youll gain weight. Its OK to treat yourself to a fat-free cookie or two. Just dont eat the whole box! A dietitian will help you figure this out, and will likely recommend that you eat three meals a day, with protein with each meal.   Date Last Reviewed: 2/4/2016  © 8065-9303 [employer]. [address] [place] [address]. All rights reserved. This information is not intended as a substitute for professional medical care. Always follow your healthcare professional's instructions.        Weight Management: Getting Started  Healthy bodies come in all shapes and sizes. Not all bodies are made to be thin. For some people, a healthy weight is higher than the average weight listed on weight charts. Your healthcare provider can help you decide on a healthy weight for you.    Reasons to lose weight  Losing weight can help with some health problems, such as high blood pressure, heart disease, diabetes, sleep apnea, and arthritis. You may also feel more energy.  Set your long-term goal  Your goal doesn't even have to be a specific weight. You may decide on a fitness goal (such as being able to walk 10 miles a week), or a health goal (such as lowering your blood pressure). Choose a goal that is measurable and reasonable, so you know when you've reached it. A goal of reaching a BMI of less than 25 is not always reasonable (or possible).   Make an action plan  Habits dont change overnight. Setting your goals too high can leave you feeling discouraged if you cant reach them. Be realistic. Choose one or two small changes you can make now. Set an action plan for how you are going to make these changes. When you can stick to this plan, keep making a few more small changes. Taking small steps will help you stay on the path  to success.  Track your progress  Write down your goals. Then, keep a daily record of your progress. Write down what you eat and how active you are. This record lets you look back on how much youve done. It may also help when youre feeling frustrated. Reward yourself for success. Even if you dont reach every goal, give yourself credit for what you do get done.  Get support  Encouragement from others can help make losing weight easier. Ask your family members and friends for support. They may even want to join you. Also look to your healthcare provider, registered dietitian, and  for help. Your local hospital can give you more information about nutrition, exercise, and weight loss.  Date Last Reviewed: 1/31/2016 © 2000-2017 [employer]. [address] [place] [address], [place] [address]. All rights reserved. This information is not intended as a substitute for professional medical care. Always follow your healthcare professional's instructions.        Weight Management: Healthy Eating  Food is your bodys fuel. You cant live without it. The key is to give your body enough nutrients and energy without eating too much. Reading food labels can help you make healthy choices. Also, learn new eating habits to manage your weight.     All the values on the label are based on one serving. The serving size is the average portion. Remember to multiply the values on the label by the number of servings you eat.   Eat less fat  A gram of fat has almost 2.5 times the calories of a gram of protein or carbohydrates. Try to balance your food choices so that only 20% to 35% of your calories comes from total fat. This means an average of 2½ to 3½ grams of fat for each 100 calories you eat.  Eat more fiber  High-fiber foods are digested more slowly than low-fiber foods, so you feel full longer. Try to get at least 25 grams of fiber each day for a 2000 calorie diet. Foods high in fiber include:  · Vegetables  and fruits  · Whole-grain or bran breads, pastas, and cereals  · Legumes (beans) and peas  As you begin to eat more fiber, be sure to drink plenty of water to keep your digestive system working smoothly.  Tips  Do's and don'ts include:   · Dont skip meals. This often leads to overeating later on. Its best to spread your eating throughout the day.  · Eat a variety of foods, not just a few favorites.  · If you find yourself eating when youre not hungry, ask yourself why. Many of us eat when were bored, stressed, or just to be polite. Listen to your body. If youre not hungry, get busy doing something else instead of eating.  · Eat slower, shooting for 20 to 30 minutes for each meal. It takes 20 minutes for your stomach to tell your brain that its full. Slow eaters tend to eat less and are still satisfied, while fast eaters may tend to be overeaters.   · Pay attention to what you eat. Dont read or watch TV during your meal.  Date Last Reviewed: 1/31/2016  © 3073-2297 [employer]. [address] [place] [address], [place] [address]. All rights reserved. This information is not intended as a substitute for professional medical care. Always follow your healthcare professional's instructions.        Weight Management: Overcoming Your Barriers    You may have many reasons why youre not ready to lose weight. You may not feel you have the time or the skills. You may be afraid of losing weight and gaining it back again. Well, you can lose weight. And you can keep the weight off, if you make changes slowly and stick with them. Remember that you may never find the perfect time to lose weight. Decide that the right time to be healthier is now.  Common barriers  Barrier 1: I dont want to deny myself.  Barrier Buster: You dont have to! Moderation is the key:  · Watch portion sizes and know when you're eating more than one serving.  · Plan to ask for a doggy bag when you eat out.  · Have just one.  · Choose lower-fat  and lower-calorie versions of your favorites.  · Use a small plate instead of a normal-sized plate.   Barrier 2: I lost weight before but I gained it right back.  Barrier Buster: Make this time different:  · List what worked and didnt work last time and what you can try this time.  · Choose changes that you are willing to stick with.  · Work exercise into your weight-loss plan.  · Be realistic about what is possible. Your plan has to fit into your life in a balanced way that works for you.   Barrier 3: I dont have the time to be active.  Barrier Buster: It takes just a few minutes a day!  · Be active with a pet or the kids.  · Block off activity time in your schedule.  · Borrow some time that you usually spend watching TV.  · You are too important not to take time to exercise--it is your life!   Feel good about yourself  Do you eat more because you feel bad about yourself, then feel even worse as you gain weight? This is a vicious cycle. Breaking this cycle is not easy. You may need group support or counseling. Always remember that you are a valuable person, no matter what size or shape you are.  Do you have a health problem? If so, dont use it as an excuse for not losing weight. Ask your healthcare provider or dietitian about methods to lose weight that are safe for you. For example, even if you have severe arthritis, it may be easier for you to exercise in a pool. Get advice from a .    Date Last Reviewed: 2/2/2016  © 7420-6309 [email or web address]. [address] [place] [address], [place] [address]. All rights reserved. This information is not intended as a substitute for professional medical care. Always follow your healthcare professional's instructions.        Weight Management: Take It Off and Keep It Off    Its easy to be motivated when you first start. The key is to stay motivated all along the way and to have realistic and achievable goals. There are things you can do to keep yourself  on the path to success.  Dont focus on daily weight gains and losses. Instead, weigh yourself no more than once a week at the same time of day. Weighing yourself each day will probably only frustrate you.    Stay motivated  Here are suggestions to keep you motivated:   · Remind yourself of your goals. Post them near the refrigerator or desk where you work.  · Make daily entries in your diary or journal about your activity and eating. A visual reminder of success, like a gold star, can help keep you going.  · Every week, take time to look back on how much youve accomplished, and the changes you may have made.  · Try taking a nutrition class. It can help you learn new shopping, cooking, and eating skills, and also meet new people. You might try a low-fat cooking or yoga class.  · Dont be hard on yourself or give up if you slip. Be patient. Learn from your mistakes and adjust your plan if you need to. Then get right back to it.  · Be realistic about your goals. Talk with a dietitian or your healthcare provider about what goals are reasonable for you.   Believe that you can do it  How you think about yourself is just as important as what you do. If you dont think you can succeed, chances are you wont. Believe that you can stick to your plan and meet your goals:  · If you dont meet a goal, dont use it as an excuse to give up on your whole plan. Adjust your goal and try again.  · Try to understand your own attitude about food.  Are you subject to emotional eating?  · Learn how to accept compliments. Even if you get embarrassed, just say thank you.  · Make a list of the things that others like about you and that you like about yourself. Add something new from time to time. Keep this list to look at when you need a lift.  Resources  · The [employer] [place] on Fitness, Sports & Nutritionwww.fitness.gov  · Academy of Nutrition and Dieteticswww.eatright.org  · Healthfinderwww.healthfinder.gov  Date Last Reviewed:  2/4/2016  © 5444-6452 [email or web address]. [address] [place] [address], [place] [address]. All rights reserved. This information is not intended as a substitute for professional medical care. Always follow your healthcare professional's instructions.      Sacroiliitis  The sacrum is the triangle-shaped bone at the base of the spine. It is linked to the other pelvis bones by the sacroiliac joints, also called SI joints. Sacroiliitis is when one or both SI joints are hurt or inflamed. It can make small movements of the lower back and pelvis very painful.        This condition has been linked to other diseases. They include ankylosing spondylitis, rheumatoid arthritis, psoriasis, and Crohns disease or colitis. Symptoms may include pain or stiffness in the hips, lower back, thighs, or buttocks. Pain occurs most often in the morning or after sitting for long periods of time. The pain may get worse when you walk. The swinging motion of the hips strains the SI joints.  Sacroiliitis is caused by many factors such as:  · Heavy lifting, especially if not done the right way  · Severe injury, such as a fall or car accident  · Osteoarthritis  · Pregnancy  · Infection of the joint  This condition is hard to diagnose. It may be confused with other causes of low back pain. To confirm the diagnosis, you may be given a shot of numbing medicine in the SI joint. Treatment includes rest, physical therapy, and anti-inflammatory medicines. If another health problem is the cause, then that must also be treated. More testing may be needed if your symptoms dont get better.  Home care  · If your healthcare provider has prescribed medicines, take all of them as directed.  · You may use over-the-counter pain medicine to control pain, unless another medicine was prescribed. If prednisone was prescribed, dont use NSAIDs, or nonsteroidal anti-inflammatory drugs, such as ibuprofen or naproxen. Talk with your provider before using this medicine  if you have chronic liver or kidney disease or ever had a stomach ulcer or GI bleeding.  · If you were referred to physical therapy, make an appointment. Be sure to do any prescribed exercises.  · Dont smoke. Smoking reduces blood flow to the inflamed area. This makes it harder to treat.  Follow-up care  Follow up with your healthcare provider, or as advised.  If you had an X-ray or an MRI, you will be notified of any new findings that may affect your care.  When to seek medical advice  Contact your healthcare provider right away if any of these occur:  · Increasing low back pain  · Inflammation of the eyes  · Skin rash or redness  · Weakness or numbness in one or both legs  · Loss of bowel or bladder control  · Numbness in the groin area  Date Last Reviewed: 11/24/2015 © 2000-2017 [employer]. [address] [place] [address]. All rights reserved. This information is not intended as a substitute for professional medical care. Always follow your healthcare professional's instructions.        Understanding Trochanteric Bursitis    A bursa is a thin, slippery, sac-like film. It contains a small amount of fluid. This structure is found between bones and soft tissues in and around joints. A bursa cushions and protects a joint. It keeps parts of a joint from rubbing against each other. If a bursa becomes inflamed and irritated, it is known as bursitis.  The trochanteric bursa is found on the hip joint. It lies on top of the bump at the top of the thighbone called the greater trochanter. Inflammation of this bursa is called trochanteric bursitis.     How to say it  [id number]   Causes of trochanteric bursitis  Causes may include:  · Overuse of the hip during running or other sports, dance, or work  · Falling on or irritation to the side of the hip  This condition may occur along with other problems, such as osteoarthritis of the hip or knee, or low back problems. In rare cases, it may occur  after hip surgery.  Symptoms of trochanteric bursitis  · Pain or aching on the side of the hip. The pain may travel down the leg.  · Swelling, tenderness, or warmth on the side of the hip at the bony bump at the top of the thigh  Treatment for trochanteric bursitis  These may include:  · Resting the hip. This allows the bursa to heal.  · Prescription or over-the-counter pain medicines. These help reduce inflammation, swelling, and pain.  · Cold packs and heat packs. These help reduce pain and swelling.  · Stretching and strengthening exercises. These improve flexibility and strength around the hip.  · Physical therapy. This includes exercises or other treatments.  · Injections of medicine into the bursa. This may help reduce inflammation and relieve symptoms.  Possible complications  If you dont give your hip time to heal, the problem may not go away, may return, or may get worse. Rest and treat your hip as directed.     When to call your healthcare provider  Call your healthcare provider right away if you have any of these:  · Fever of 100.4°F (38°C) or higher, or as directed  · Redness, swelling, or warmth that gets worse  · Symptoms that dont get better with prescribed medicines, or get worse  · New symptoms   Date Last Reviewed: 3/29/2016  © 1987-2030 [employer]. [address] [place] [address], [place] [address]. All rights reserved. This information is not intended as a substitute for professional medical care. Always follow your healthcare professional's instructions.        Back Exercises: Hip Lift    To start, lie on your back with your knees bent and feet flat on the floor. Dont press your neck or lower back to the floor. Breathe deeply. You should feel comfortable and relaxed in this position:  · Tighten your abdomen and buttocks.  · Slowly raise your hips upward. Be careful not to arch your back.  · Hold for 5 seconds. Lower your hips to the floor.  · Repeat 10 times.  For your safety, check  with your healthcare provider before starting an exercise program.   Date Last Reviewed: 8/16/2015  © 9156-9904 [employer]. [address] [place] [address]. All rights reserved. This information is not intended as a substitute for professional medical care. Always follow your healthcare professional's instructions.        Back Exercises: Hip Rotator Stretch    To start, lie on your back with your knees bent and feet flat on the floor. Dont press your neck or lower back to the floor. Breathe deeply. You should feel comfortable and relaxed in this position.  · Rest your right ankle on your left knee.  · Place a towel behind your left thigh, and use it to pull the knee toward your chest. Feel the stretch in your buttocks.  · Hold for 30 to 60 seconds. Release.  · Repeat 2 times.  · Switch legs.   · If there is any pain other than stretch in the knee or buttock, stop and contact your healthcare provider.  For your safety, check with your healthcare provider before starting an exercise program.   Date Last Reviewed: 8/16/2015 © 2000-2017 [employer]. [address] [place] [address]. All rights reserved. This information is not intended as a substitute for professional medical care. Always follow your healthcare professional's instructions.        Hip Abduction (Strength)    1. Lie on your right side on the floor with your legs straight.  2. Raise your left leg about 6 to 8 inches. Keep your legs and hips straight. Dont roll back onto your hip. Hold for 5 seconds, then lower your leg.  3. Repeat 10 times, or as instructed.  4. Switch legs and repeat.     Challenge yourself  Put an elastic band or tubing around both ankles. Hold the band to the floor with your bottom ankle. Raise and lower your top leg slowly and steadily.   Date Last Reviewed: 3/10/2016  © 2012-7437 [employer]. [address] [place] [address]. All rights reserved. This  information is not intended as a substitute for professional medical care. Always follow your healthcare professional's instructions.        Hip Abduction with External Rotation (Strength)    These instructions are for your right knee. Switch sides for your left  knee.  5. Get down on the floor on your hands and knees.  6. Lift your right leg up and out to the side. Keep the knee bent. Raise the leg as high as is comfortable. Hold for 3 seconds.  7. Slowly lower your leg back to the floor.  8. Repeat 5 times, or as instructed.  Date Last Reviewed: 3/10/2016  © 9258-8598 [employer]. [address] [place] [address]. All rights reserved. This information is not intended as a substitute for professional medical care. Always follow your healthcare professional's instructions.        Hip Adduction (Strength)    These instructions are for your right foot. Switch sides for your left foot.  9. Lie on your right side on the floor. Keep your right leg straight. Bend your left leg and put your left foot flat on the floor behind your right knee.  10. Raise your right leg as high as you comfortably can. Hold for 5 seconds, then lower it back down.  11. Repeat 10 times, or as instructed.  12. Switch legs and repeat.  Date Last Reviewed: 3/10/2016  © 7383-5135 [employer]. [address] [place] [address]. All rights reserved. This information is not intended as a substitute for professional medical care. Always follow your healthcare professional's instructions.        Hip Adductor Stretch (Flexibility)    13. Sit on the floor. Put the soles of your feet together so your knees are pointed outward.  14. Pull your heels in toward your groin, as close as is comfortable.  15. Put your hands on your knees, and gently push them closer to the floor.  16. Hold for 30 to 60 seconds.  17. Relax and repeat 2 times, or as instructed.  18. Repeat this exercise 3 times a day, or as  instructed.  Date Last Reviewed: 3/10/2016  © 0711-5018 [employer]. [address] [place] [address]. All rights reserved. This information is not intended as a substitute for professional medical care. Always follow your healthcare professional's instructions.        Hip Extension (Strength)    19. Lie on your back on the floor with your knees bent and feet flat on the floor. Put your arms at your side, palms flat on the floor.  20. Tighten your core muscles and keep them tight through the whole exercise.  21. Push down on your feet and raise your hips and lift your buttocks off the floor.  22. Dont arch your back.  23. Hold for 5 seconds.  24. Lower your buttocks back down to the floor.  25. Repeat 5 to 10 times, or as instructed.  Date Last Reviewed: 3/10/2016  © 2975-0760 [employer]. [address] [place] [address]. All rights reserved. This information is not intended as a substitute for professional medical care. Always follow your healthcare professional's instructions.        Side Lying Hip Abduction (Strength)    26. Lie down on the floor on your side. Rest your head on your arm. Bend your legs at the knees.  27. Keep your feet together and lift your top leg up so that your knees are . Keep your hips steady.     28. Slowly lower your leg back down.  29. Repeat 10 times, or as instructed.  30. Switch sides if instructed.     Challenge yourself  Put an elastic band or tubing around your thighs. Raise and lower your top leg slowly and steadily.      Date Last Reviewed: 3/29/2016  © 3944-2241 [employer]. [address] [place] [address]. All rights reserved. This information is not intended as a substitute for professional medical care. Always follow your healthcare professional's instructions.        Prone Hip Extension     31. Lie on your stomach on the floor with your legs straight. You can lie on a mat or towel. Rest your  head on your arms.  32. Raise your right leg a few inches off the floor. Keep the right knee straight. Hold for 5 seconds.  33. Slowly lower your leg back down.  34. Repeat 5 times, or as instructed.  Date Last Reviewed: 3/10/2016  © 4475-3063 [employer]. [address] [place] [address], [place] [address]. All rights reserved. This information is not intended as a substitute for professional medical care. Always follow your healthcare professional's instructions.        Back Care Tips    Caring for your back  These are things you can do to prevent a recurrence of acute back pain and to reduce symptoms from chronic back pain:  · Maintain a healthy weight. If you are overweight, losing weight will help most types of back pain.  · Exercise is an important part of recovery from most types of back pain. The muscles behind and in front of the spine support the back. This means strengthening both the back muscles and the abdominal muscles will provide better support for your spine.   · Swimming and brisk walking are good overall exercises to improve your fitness level.  · Practice safe lifting methods (below).  · Practice good posture when sitting, standing and walking. Avoid prolonged sitting. This puts more stress on the lower back than standing or walking.  · Wear quality shoes with sufficient arch support. Foot and ankle alignment can affect back symptoms. Women should avoid wearing high heels.  · Therapeutic massage can help relax the back muscles without stretching them.  · During the first 24 to 72 hours after an acute injury or flare-up of chronic back pain, apply an ice pack to the painful area for 20 minutes and then remove it for 20 minutes, over a period of 60 to 90 minutes, or several times a day. As a safety precaution, do not use a heating pad at bedtime. Sleeping on a heating pad can lead to skin burns or tissue damage.  · You can alternate ice and heat therapies.  Medications  Talk to your healthcare  provider before using medicines, especially if you have other medical problems or are taking other medicines.  · You may use acetaminophen or ibuprofen to control pain, unless your healthcare provider prescribed other pain medicine. If you have chronic conditions like diabetes, liver or kidney disease, stomach ulcers, or gastrointestinal bleeding, or are taking blood thinners, talk with your healthcare provider before taking any medicines.  · Be careful if you are given prescription pain medicines, narcotics, or medicine for muscle spasm. They can cause drowsiness, affect your coordination, reflexes, and judgment. Do not drive or operate heavy machinery while taking these types of medicines. Take prescription pain medicine only as prescribed by your healthcare provider.  Lumbar stretch  Here is a simple stretching exercise that will help relax muscle spasm and keep your back more limber. If exercise makes your back pain worse, dont do it.  · Lie on your back with your knees bent and both feet on the ground.  · Slowly raise your left knee to your chest as you flatten your lower back against the floor. Hold for 5 seconds.  · Relax and repeat the exercise with your right knee.  · Do 10 of these exercises for each leg.  Safe lifting method  · Dont bend over at the waist to lift an object off the floor.  Instead, bend your knees and hips in a squat.   · Keep your back and head upright  · Hold the object close to your body, directly in front of you.  · Straighten your legs to lift the object.   · Lower the object to the floor in the reverse fashion.  · If you must slide something across the floor, push it.  Posture tips  Sitting  Sit in chairs with straight backs or low-back support. Keep your knees lower than your hips, with your feet flat on the floor.  When driving, sit up straight. Adjust the seat forward so you are not leaning toward the steering wheel.  A small pillow or rolled towel behind your lower back may  help if you are driving long distances.   Standing  When standing for long periods, shift most of your weight to one leg at a time. Alternate legs every few minutes.   Sleeping  The best way to sleep is on your side with your knees bent. Put a low pillow under your head to support your neck in a neutral spine position. Avoid thick pillows that bend your neck to one side. Put a pillow between your legs to further relax your lower back. If you sleep on your back, put pillows under your knees to support your legs in a slightly flexed position. Use a firm mattress. If your mattress sags, replace it, or use a 1/2-inch plywood board under the mattress to add support.  Follow-up care  Follow up with your healthcare provider, or as advised.  If X-rays, a CT scan or an MRI scan were taken, they will be reviewed by a radiologist. You will be notified of any new findings that may affect your care.  Call 911  Seek emergency medical care if any of the following occur:  · Trouble breathing  · Confusion  · Very drowsy  · Fainting or loss of consciousness  · Rapid or very slow heart rate  · Loss of  bowel or bladder control  When to seek medical care  Call your healthcare provider if any of the following occur:  · Pain becomes worse or spreads to your arms or legs  · Weakness or numbness in one or both arms or legs  · Numbness in the groin area  Date Last Reviewed: 6/1/2016  © 8516-5523 [employer]. [address] [place] [address]. All rights reserved. This information is not intended as a substitute for professional medical care. Always follow your healthcare professional's instructions.        Exercises to Strengthen Your Lower Back  Strong lower back and abdominal muscles work together to support your spine. The exercises below will help strengthen the lower back. It is important that you begin exercising slowly and increase levels gradually.  Always begin any exercise program with stretching. If you feel  pain while doing any of these exercises, stop and talk to your doctor about a more specific exercise program that better suits your condition.   Low back stretch  The point of stretching is to make you more flexible and increase your range of motion. Stretch only as much as you are able. Stretch slowly. Do not push your stretch to the limit. If at any point you feel pain while stretching, this is your (temporary) limit.  · Lie on your back with your knees bent and both feet on the ground.  · Slowly raise your left knee to your chest as you flatten your lower back against the floor. Hold for 5 seconds.  · Relax and repeat the exercise with your right knee.  · Do 10 of these exercises for each leg.  · Repeat hugging both knees to your chest at the same time.  Building lower back strength  Start your exercise routine with 10 to 30 minutes a day, 1 to 3 times a day.  Initial exercises  Lying on your back:  1. Ankle pumps: Move your foot up and down, towards your head, and then away. Repeat 10 times with each foot.  2. Heel slides: Slowly bend your knee, drawing the heel of your foot towards you. Then slide your heel/foot from you, straightening your knee. Do not lift your foot off the floor (this is not a leg lift).  3. Abdominal contraction: Bend your knees and put your hands on your stomach. Tighten your stomach muscles. Hold for 5 seconds, then relax. Repeat 10 times.  4. Straight leg raise: Bend one leg at the knee and keep the other leg straight. Tighten your stomach muscles. Slowly lift your straight leg 6 to 12 inches off the floor and hold for up to 5 seconds. Repeat 10 times on each side.  Standin. Wall squats: Stand with your back against the wall. Move your feet about 12 inches away from the wall. Tighten your stomach muscles, and slowly bend your knees until they are at about a 45 degree angle. Do not go down too far. Hold about 5 seconds. Then slowly return to your starting position. Repeat 10  times.  2. Heel raises: Stand facing the wall. Slowly raise the heels of your feet up and down, while keeping your toes on the floor. If you have trouble balancing, you can touch the wall with your hands. Repeat 10 times.  More advanced exercises  When you feel comfortable enough, try these exercises.  1. Kneeling lumbar extension: Begin on your hands and knees. At the same time, raise and straighten your right arm and left leg until they are parallel to the ground. Hold for 2 seconds and come back slowly to a starting position. Repeat with left arm and right leg, alternating 10 times.  2. Prone lumbar extension: Lie face down, arms extended overhead, palms on the floor. At the same time, raise your right arm and left leg as high as comfortably possible. Hold for 10 seconds and slowly return to start. Repeat with left arm and right leg, alternating 10 times. Gradually build up to 20 times. (Advanced: Repeat this exercise raising both arms and both legs a few inches off the floor at the same time. Hold for 5 seconds and release.)  3. Pelvic tilt: Lie on the floor on your back with your knees bent at 90 degrees. Your feet should be flat on the floor. Inhale, exhale, then slowly contract your abdominal muscles bringing your navel toward your spine. Let your pelvis rock back until your lower back is flat on the floor. Hold for 10 seconds while breathing smoothly.  4. Abdominal crunch: Perform a pelvic tilt (above) flattening your lower back against the floor. Holding the tension in your abdominal muscles, take another breath and raise your shoulder blades off the ground (this is not a full sit-up). Keep your head in line with your body (dont bend your neck forward). Hold for 2 seconds, then slowly lower.  Date Last Reviewed: 6/1/2016  © 4723-1892 "[employer]. [address] [place] [address], [place] [address]. All rights reserved. This information is not intended as a substitute for professional medical care.  Always follow your healthcare professional's instructions.

## 2019-02-05 NOTE — PROGRESS NOTES
PM&R NEW PATIENT HISTORY & PHYSICAL :    Referring Physician: Aung    Chief Complaint   Patient presents with    Back Pain     low back pain to the left leg    Numbness     left leg numbness/tingling, weakness       HPI: This is a 53 y.o.  female being seen in clinic today for evaluation of chronic low back pain with numbness and tingling into her legs.  Her symptoms are worse with prolonged walking/standing.  Sitting or resting provides some relief.  She feels some weakness in her legs.  She will be starting PT soon.    History obtained from patient    Functional History:  Walking: Not limited  Transfers: Independent  Assistive devices: No  Power mobility: No  Falls: None       Needs help with:  Nothing - all ADLS normal    Cooking   Cleaning  Bathing   Dressing   Toileting     Past family, medical, social, and surgical history reviewed in chart    Review of Systems:     General- denies lethargy, weight change, fever, chills  Head/neck- denies swallowing difficulties  ENT- denies hearing changes  Cardiovascular-denies chest pain  Pulmonary- denies shortness of breath  GI- denies constipation or bowel incontinence  - denies bladder incontinence  Skin- denies wounds or rashes  Musculoskeletal- +weakness, +pain  Neurologic- +numbness and tingling  Psychiatric- denies depressive or psychotic features, denies anxiety  Lymphatic-denies swelling  Endocrine- denies hypoglycemic symptoms/DM history  All other pertinent systems negative     Physical Examination:  General: Well developed, well nourished female, NAD  HEENT:NCAT EOMI bilaterally   Pulmonary:Normal respirations    Spinal Examination: CERVICAL  Active ROM is within normal limits.  Inspection: No deformity of spinal alignment.      Spinal Examination: LUMBAR or THORACIC  Active ROM is limited in all planes  Inspection: No deformity of spinal alignment.  No palpable olisthesis.  Palpation: No vertebral tenderness to percussion.  VERY ttp at si joints-worse  on left, ttp at bilateral piriformis and gt bursas  Facet loading +bilaterally  SLR Test (seated and supine):negative  bilaterally  Barely able to stand on heels and toes  Trendelenburg test: negative    Bilateral Upper and Lower Extremities:  Pulses are 2+ at radial,  bilaterally.  Shoulder/Elbow/Wrist/Hand ROM   Hip/Knee/Ankle ROM wnl  Bilateral Extremities show normal capillary refill.  No signs of cyanosis, rubor, edema, skin changes, or dysvascular changes of appendages.  Nails appear intact.    Neurological Exam:  Cranial Nerves:  II-XII grossly intact    Manual Muscle Testing: (Motor 5=normal)  *poor effort   Grossly 4/5 strength bilateral lower exts except 3+/5 at hip add/abd/flex  No focal atrophy is noted of either upper or lower extremity.    Bilateral Reflexes:hypo at patellar  Mcgee's response is absent bilaterally.  No clonus at knee or ankle.    Sensation: tested to light touch  - intact in legs except dec at right lateral -posterior thigh, left post thigh  Gait: slow short stride with limited hip flex    IMPRESSION/PLAN: This is a 53 y.o.  female with lumbar DJD/DDD, sacroiliitis, hip weakness, GT bursitis, mild piriformis syndrome, obesity:Body mass index is 33.66 kg/m².      1. Update PT orders-core and hip strengthening, pelvic stabilization, increase endurance, gait, modalities, dec pain, HEP  2. Ice instead of heat, topical agents  3. Handouts on back care, exercise, diet, weight loss provided  4. Fu prn. Pt would benefit from si joint injections, but not approved by insurance    Melanie Henry M.D.  Physical Medicine and Rehab

## 2019-02-07 DIAGNOSIS — Z94.4 LIVER REPLACED BY TRANSPLANT: Primary | ICD-10-CM

## 2019-02-08 RX ORDER — MYCOPHENOLATE MOFETIL 250 MG/1
500 CAPSULE ORAL 2 TIMES DAILY
Qty: 120 CAPSULE | Refills: 11 | Status: SHIPPED | OUTPATIENT
Start: 2019-02-08 | End: 2019-03-28 | Stop reason: ALTCHOICE

## 2019-02-13 DIAGNOSIS — R60.0 BILATERAL LEG EDEMA: ICD-10-CM

## 2019-02-13 DIAGNOSIS — Z29.89 PROPHYLACTIC IMMUNOTHERAPY: ICD-10-CM

## 2019-02-14 RX ORDER — GABAPENTIN 600 MG/1
TABLET ORAL
Qty: 90 TABLET | Refills: 3 | Status: SHIPPED | OUTPATIENT
Start: 2019-02-14 | End: 2019-03-18 | Stop reason: SDUPTHER

## 2019-02-15 RX ORDER — FUROSEMIDE 40 MG/1
TABLET ORAL
Qty: 30 TABLET | Refills: 0 | Status: SHIPPED | OUTPATIENT
Start: 2019-02-15 | End: 2019-03-18 | Stop reason: SDUPTHER

## 2019-02-22 DIAGNOSIS — E83.42 HYPOMAGNESEMIA: ICD-10-CM

## 2019-02-22 DIAGNOSIS — Z94.4 LIVER REPLACED BY TRANSPLANT: ICD-10-CM

## 2019-02-22 DIAGNOSIS — B96.89 ACUTE BACTERIAL RHINOSINUSITIS: ICD-10-CM

## 2019-02-22 DIAGNOSIS — D84.9 IMMUNOSUPPRESSION: ICD-10-CM

## 2019-02-22 DIAGNOSIS — J01.90 ACUTE BACTERIAL RHINOSINUSITIS: ICD-10-CM

## 2019-02-22 DIAGNOSIS — J30.9 ALLERGIC RHINITIS, UNSPECIFIED SEASONALITY, UNSPECIFIED TRIGGER: ICD-10-CM

## 2019-02-22 RX ORDER — LANOLIN ALCOHOL/MO/W.PET/CERES
CREAM (GRAM) TOPICAL
Qty: 270 TABLET | Refills: 0 | Status: SHIPPED | OUTPATIENT
Start: 2019-02-22 | End: 2019-03-28

## 2019-02-22 RX ORDER — AZELASTINE 1 MG/ML
2 SPRAY, METERED NASAL 2 TIMES DAILY PRN
Qty: 30 ML | Refills: 5 | Status: SHIPPED | OUTPATIENT
Start: 2019-02-22 | End: 2020-05-28 | Stop reason: ALTCHOICE

## 2019-02-22 RX ORDER — PREDNISONE 5 MG/1
TABLET ORAL
Qty: 30 TABLET | Refills: 0 | OUTPATIENT
Start: 2019-02-22

## 2019-02-25 ENCOUNTER — CLINICAL SUPPORT (OUTPATIENT)
Dept: REHABILITATION | Facility: HOSPITAL | Age: 54
End: 2019-02-25
Attending: ORTHOPAEDIC SURGERY
Payer: MEDICAID

## 2019-02-25 ENCOUNTER — LAB VISIT (OUTPATIENT)
Dept: LAB | Facility: HOSPITAL | Age: 54
End: 2019-02-25
Attending: INTERNAL MEDICINE
Payer: MEDICAID

## 2019-02-25 DIAGNOSIS — R53.1 WEAKNESS: Primary | ICD-10-CM

## 2019-02-25 DIAGNOSIS — M47.26 OSTEOARTHRITIS OF SPINE WITH RADICULOPATHY, LUMBAR REGION: ICD-10-CM

## 2019-02-25 DIAGNOSIS — M53.3 SI (SACROILIAC) PAIN: ICD-10-CM

## 2019-02-25 DIAGNOSIS — M70.62 GREATER TROCHANTERIC BURSITIS OF BOTH HIPS: Primary | ICD-10-CM

## 2019-02-25 DIAGNOSIS — M70.61 GREATER TROCHANTERIC BURSITIS OF BOTH HIPS: Primary | ICD-10-CM

## 2019-02-25 DIAGNOSIS — Z94.4 LIVER REPLACED BY TRANSPLANT: ICD-10-CM

## 2019-02-25 LAB
ALBUMIN SERPL BCP-MCNC: 3.8 G/DL
ALP SERPL-CCNC: 208 U/L
ALT SERPL W/O P-5'-P-CCNC: 23 U/L
ANION GAP SERPL CALC-SCNC: 10 MMOL/L
AST SERPL-CCNC: 18 U/L
BASOPHILS # BLD AUTO: 0.03 K/UL
BASOPHILS NFR BLD: 0.5 %
BILIRUB SERPL-MCNC: 0.4 MG/DL
BUN SERPL-MCNC: 16 MG/DL
CALCIUM SERPL-MCNC: 9.8 MG/DL
CHLORIDE SERPL-SCNC: 105 MMOL/L
CO2 SERPL-SCNC: 27 MMOL/L
CREAT SERPL-MCNC: 0.9 MG/DL
DIFFERENTIAL METHOD: ABNORMAL
EOSINOPHIL # BLD AUTO: 0.2 K/UL
EOSINOPHIL NFR BLD: 3.6 %
ERYTHROCYTE [DISTWIDTH] IN BLOOD BY AUTOMATED COUNT: 16.8 %
EST. GFR  (AFRICAN AMERICAN): >60 ML/MIN/1.73 M^2
EST. GFR  (NON AFRICAN AMERICAN): >60 ML/MIN/1.73 M^2
GLUCOSE SERPL-MCNC: 82 MG/DL
HCT VFR BLD AUTO: 39.9 %
HGB BLD-MCNC: 12.3 G/DL
IMM GRANULOCYTES # BLD AUTO: 0.01 K/UL
IMM GRANULOCYTES NFR BLD AUTO: 0.2 %
LYMPHOCYTES # BLD AUTO: 1.6 K/UL
LYMPHOCYTES NFR BLD: 24.2 %
MAGNESIUM SERPL-MCNC: 1.7 MG/DL
MCH RBC QN AUTO: 24 PG
MCHC RBC AUTO-ENTMCNC: 30.8 G/DL
MCV RBC AUTO: 78 FL
MONOCYTES # BLD AUTO: 0.4 K/UL
MONOCYTES NFR BLD: 6.6 %
NEUTROPHILS # BLD AUTO: 4.3 K/UL
NEUTROPHILS NFR BLD: 64.9 %
NRBC BLD-RTO: 0 /100 WBC
PLATELET # BLD AUTO: 64 K/UL
PMV BLD AUTO: 12.3 FL
POTASSIUM SERPL-SCNC: 4.4 MMOL/L
PROT SERPL-MCNC: 7.3 G/DL
RBC # BLD AUTO: 5.12 M/UL
SODIUM SERPL-SCNC: 142 MMOL/L
WBC # BLD AUTO: 6.62 K/UL

## 2019-02-25 PROCEDURE — 85025 COMPLETE CBC W/AUTO DIFF WBC: CPT

## 2019-02-25 PROCEDURE — 97161 PT EVAL LOW COMPLEX 20 MIN: CPT

## 2019-02-25 PROCEDURE — 80197 ASSAY OF TACROLIMUS: CPT

## 2019-02-25 PROCEDURE — 80053 COMPREHEN METABOLIC PANEL: CPT

## 2019-02-25 PROCEDURE — 83735 ASSAY OF MAGNESIUM: CPT

## 2019-02-25 PROCEDURE — 36415 COLL VENOUS BLD VENIPUNCTURE: CPT

## 2019-02-25 NOTE — PROGRESS NOTES
PHYSICAL THERAPY INITIAL OUTPATIENT EVALUATION    Referring Provider:  Devon Robles    Diagnosis:       ICD-10-CM ICD-9-CM    1. Weakness R53.1 780.79        Orders:  Evaluate and Treat    Date of Initial Evaluation: 2/25/2019    Visit # 1     BACKGROUND: 52 y/o female with chronic back pain and bilateral LE radiculopathy. Reports left sided weakness for approximately 2 years ago following liver transplant.     Past Surgical History:   Procedure Laterality Date    BREAST BIOPSY Left       benign    CHOLECYSTECTOMY        COLONOSCOPY N/A 12/5/2017     Performed by [facility] at [facility] (2ND FLR)    COLONOSCOPY N/A 12/1/2017     Performed by Filemon Fuentes [facility] at [facility] (2ND FLR)    ESOPHAGOGASTRODUODENOSCOPY (EGD) N/A 12/1/2017     Performed by Filemon Fu[facility] at [facility] (2ND FLR)    HYSTERECTOMY         complete     LIVER TRANSPLANT   12/2017    TRANSPLANT-LIVER N/A 12/26/2017     Performed by Romeo Moore MD at [facility] OR 2ND FLR        Past Medical History:   Diagnosis Date    Alcohol abuse      Alcoholic hepatitis      Anemia of chronic disease      Coagulopathy      Encounter for blood transfusion      End stage liver disease      Hypertension      Hyponatremia      Liver cirrhosis      Liver transplant candidate 12/26/2017    Obesity (BMI 30-39.9) 1/5/2016       IMAGING:  FINDINGS:  Vertebral body heights and alignment are maintained.  No concerning marrow signal abnormality.  L4 vertebral body hemangioma present.  There is mild disc height loss at L5-S1.  Conus terminates normally.    Intra-abdominal/pelvic visualized structures are unremarkable.    L1-L2: No spinal canal stenosis or neural foraminal narrowing.    L2-L3: No spinal canal stenosis or neural foraminal narrowing.    L3-L4: Mild circumferential disc bulge present.  Facet/ligamentum flavum hypertrophy noted.  Mild bilateral inferior neural foraminal narrowing present.  Mild central spinal canal stenosis  present.    L4-L5: Mild circumferential disc bulging present.  Facet ligamentum flavum hypertrophy noted.  Mild spinal canal stenosis with mild left greater than right inferior neural foraminal narrowing.    L5-S1: No significant posterior disc bulge or spinal canal stenosis.  Facet degenerative hypertrophy present with mild left-sided neural foraminal narrowing.  PAIN: 6/10 current and average   10/10 at worst  OBJECTIVE: Pleasant female ambulating into clinic with antalgic gait.   Patient with uncoordinated activities upon request but was able to perform spontaneously.    Posture/Structure:  Flexed trunk and left lateral trunk flexion    L/sp AROM:   % Pain Present (Y/N)   FB 50 Y   RSB 25 Y   LSB 25 Y   BB 0 Y     Palpation: Patient has tenderness with all palpation of bilateral LE and trunk. Most tender to right left lateral thigh and lumbar spine    Strength:   Right Left   Hip Flexor 3 3   Quad 3- 3-   Hamstring 3- 3-   ANT TIB 3 3     Neuro/Sensation:   Reflexes: unable to assess correctly due to patient guarding    Special Test: (+) SLR, slump, CIERA, obdanial    Function: Patient reports 58% disability based on score of the Oswestry Low back Pain questionnaire Scale.    ASSESSMENT:  The patient is a 53 y.o. year old female who presents to physical therapy with complaints of back pain and bilateral LE radiculopathy. Patient was painful with all movements of LE and trunk. Ambulates with antalgic gait pattern and was unable to participate in AROM or PROM. Unable to perform coordinated single plane movements of LE upon command but was able to perform spontaneously.  Patient's impairments include back and LE pain which limits ambulation, household and community activities.  These impairments are limiting patient's ability to perform household and leisure activities.  Patient's prognosis is fair.  Recommended aquatic therapy to allow for increased activity tolerance due to buoyancy of the water. Clinical  presentation is stable requiring a low complexity evaluation.   Long Term Goals:  1.Independent with HEP    TREATMENT PROVIDED:  -Evaluation: 30 minutes individual time  -Education on proper posture, body mechanics and condition    PLAN:  Referred patient for aquatic PT    Thank you for this referral.    These services are reasonable and necessary for the conditions set forth above while under my care.

## 2019-02-26 LAB — TACROLIMUS BLD-MCNC: 6.8 NG/ML

## 2019-02-28 ENCOUNTER — TELEPHONE (OUTPATIENT)
Dept: TRANSPLANT | Facility: CLINIC | Age: 54
End: 2019-02-28

## 2019-02-28 RX ORDER — FAMOTIDINE 20 MG/1
20 TABLET, FILM COATED ORAL 2 TIMES DAILY
Qty: 60 TABLET | Refills: 11 | Status: SHIPPED | OUTPATIENT
Start: 2019-02-28 | End: 2019-09-04 | Stop reason: SDUPTHER

## 2019-02-28 NOTE — TELEPHONE ENCOUNTER
Dr. Souza reviewed your labs.  Continue routine labs no changes needed.  Letter sent for next lab appointment 03/25/19

## 2019-02-28 NOTE — TELEPHONE ENCOUNTER
----- Message from Joselin Souza MD sent at 2/27/2019  5:30 PM CST -----  Reviewed, nothing to do; repeat per routine

## 2019-03-18 ENCOUNTER — OFFICE VISIT (OUTPATIENT)
Dept: PAIN MEDICINE | Facility: CLINIC | Age: 54
End: 2019-03-18
Payer: MEDICAID

## 2019-03-18 VITALS
SYSTOLIC BLOOD PRESSURE: 120 MMHG | HEART RATE: 76 BPM | DIASTOLIC BLOOD PRESSURE: 78 MMHG | HEIGHT: 63 IN | WEIGHT: 190.06 LBS | RESPIRATION RATE: 17 BRPM | BODY MASS INDEX: 33.68 KG/M2

## 2019-03-18 DIAGNOSIS — M79.645 PAIN OF LEFT THUMB: ICD-10-CM

## 2019-03-18 DIAGNOSIS — G89.29 CHRONIC PAIN OF RIGHT KNEE: ICD-10-CM

## 2019-03-18 DIAGNOSIS — M79.642 LEFT HAND PAIN: ICD-10-CM

## 2019-03-18 DIAGNOSIS — M25.561 CHRONIC PAIN OF RIGHT KNEE: ICD-10-CM

## 2019-03-18 DIAGNOSIS — M25.552 LEFT HIP PAIN: ICD-10-CM

## 2019-03-18 DIAGNOSIS — Z29.89 PROPHYLACTIC IMMUNOTHERAPY: ICD-10-CM

## 2019-03-18 DIAGNOSIS — R60.0 BILATERAL LEG EDEMA: ICD-10-CM

## 2019-03-18 DIAGNOSIS — M51.36 DEGENERATIVE DISC DISEASE, LUMBAR: Primary | ICD-10-CM

## 2019-03-18 DIAGNOSIS — R29.898 WEAKNESS OF BOTH LOWER EXTREMITIES: ICD-10-CM

## 2019-03-18 PROCEDURE — 99999 PR PBB SHADOW E&M-EST. PATIENT-LVL IV: ICD-10-PCS | Mod: PBBFAC,,, | Performed by: PHYSICIAN ASSISTANT

## 2019-03-18 PROCEDURE — 99214 OFFICE O/P EST MOD 30 MIN: CPT | Mod: PBBFAC,PN | Performed by: PHYSICIAN ASSISTANT

## 2019-03-18 PROCEDURE — 99214 PR OFFICE/OUTPT VISIT, EST, LEVL IV, 30-39 MIN: ICD-10-PCS | Mod: S$PBB,,, | Performed by: PHYSICIAN ASSISTANT

## 2019-03-18 PROCEDURE — 99214 OFFICE O/P EST MOD 30 MIN: CPT | Mod: S$PBB,,, | Performed by: PHYSICIAN ASSISTANT

## 2019-03-18 PROCEDURE — 99999 PR PBB SHADOW E&M-EST. PATIENT-LVL IV: CPT | Mod: PBBFAC,,, | Performed by: PHYSICIAN ASSISTANT

## 2019-03-18 RX ORDER — METHOCARBAMOL 750 MG/1
750 TABLET, FILM COATED ORAL 3 TIMES DAILY PRN
Qty: 60 TABLET | Refills: 0 | Status: SHIPPED | OUTPATIENT
Start: 2019-03-18 | End: 2019-05-22 | Stop reason: SDUPTHER

## 2019-03-18 RX ORDER — GABAPENTIN 600 MG/1
TABLET ORAL
Qty: 90 TABLET | Refills: 0 | Status: SHIPPED | OUTPATIENT
Start: 2019-03-18 | End: 2019-04-29 | Stop reason: SDUPTHER

## 2019-03-18 NOTE — PROGRESS NOTES
Chief Pain Complaint:  Left Hip pain, Right knee pain    Interval History:  Patient was last seen on 1/30/2019. At that visit, the plan was to start PT.  She has not been able to start PT.  She wants to go to aquatic therapy.  She finds relief with gabapentin and Robaxin.  She is complaining of newer right knee pain.     Interval History:  Ms. Bazan returns for followup.  She reports that she has left hip pain which has been bothering her for approximately 1.5 months.  There is no inciting event she states that this started gradually and has progressively gotten worse.  She describes currently a grinding sensation located in the left hip which is worse with activity including things as simple as rolling over in bed.  She has been recently seen by Orthopedics and was prescribed a Medrol Dosepak which she is currently taking and reports that his provided no benefit.  She denies having numbness and weakness in her leg she denies having bowel bladder difficulties.  Currently her pain is rated 8/10.  She has obtained bilateral hip x-rays which do not show any degenerative changes.    Initial HPI:  This patient is a 53 y.o. female who presents today complaining of the above noted pain/s. The patient describes the pain as follows.  Ms. Bazan has a history of hypertension, liver transplant, lumbar spondylosis who presents to clinic with complaints of right-sided cervical pain and lumbar pain which radiates into bilateral legs.  She has been having these symptoms since December 2017.  Currently she rates her pain as a 9/10 and describes the low back pain radiating leg pain as constant burning while the neck pain is described as a shocking type of pain and radiates from the low cervical spine superiorly.  The radiating pain down right leg does not go into the foot and travels in the lateral aspect of the leg and crosses medially across the knee in the L4 distribution.  She endorses bilateral lower extremity weakness.  Denies  radiation of cervical pain into the arms.  She is currently working with physical therapy and has been since she underwent liver transplant in December 2017.  She denies bowel or bladder changes.    Previous Therapy:  Medications:  Gabapentin, Robaxin  Injections:  None  Surgeries:  No spinal surgeries.  Physical Therapy:  Has been participating since December 2017    Past Surgical History:   Procedure Laterality Date    BREAST BIOPSY Left     benign    CHOLECYSTECTOMY      COLONOSCOPY N/A 12/5/2017    Performed by [facility] at [facility] (2ND FLR)    COLONOSCOPY N/A 12/1/2017    Performed by Filemon Fuentes MD at [facility] (2ND FLR)    ESOPHAGOGASTRODUODENOSCOPY (EGD) N/A 12/1/2017    Performed by Filemon Fuentes MD at [facility] END[facility] (2ND FLR)    HYSTERECTOMY      complete     LIVER TRANSPLANT  12/2017    TRANSPLANT-LIVER N/A 12/26/2017    Performed by Romeo Moore MD at [facility] OR 2ND FLR         Imaging / Labs / Studies (reviewed on 3/18/2019):    Results for orders placed during the hospital encounter of 01/10/19   X-Ray Hip 2 or 3 views Left    Narrative FINDINGS:  There is relative preservation of the hip joint spaces.  No fracture or dislocation is seen.  No collapse of the femoral heads.  Sacroiliac joints remain intact.  Pubic symphysis demonstrates a normal appearance       Results for orders placed during the hospital encounter of 10/03/18   X-Ray Cervical Spine 5 View W Flex Extxt    Narrative COMPARISON:  None  FINDINGS:  There is some straightening of the normal cervical lordosis.  Minimal retrolisthesis of C5 on C6 noted.  Vertebral body heights are within normal limits.  No change in spinal alignment with flexion or extension to suggest instability.  There is mild disc height loss at C5-6 and C6-7 with associated degenerative endplate spurring.  Posterior elements appear intact without acute fractures or subluxations demonstrated.  Odontoid process appears intact.  Atlantoaxial  articulations appear normal.  Prevertebral soft tissues are within normal limits.       Results for orders placed during the hospital encounter of 10/15/18   MRI Lumbar Spine Without Contrast    Narrative FINDINGS:  Vertebral body heights and alignment are maintained.  No concerning marrow signal abnormality.  L4 vertebral body hemangioma present.  There is mild disc height loss at L5-S1.  Conus terminates normally.  Intra-abdominal/pelvic visualized structures are unremarkable.  L1-L2: No spinal canal stenosis or neural foraminal narrowing.  L2-L3: No spinal canal stenosis or neural foraminal narrowing.  L3-L4: Mild circumferential disc bulge present.  Facet/ligamentum flavum hypertrophy noted.  Mild bilateral inferior neural foraminal narrowing present.  Mild central spinal canal stenosis present.  L4-L5: Mild circumferential disc bulging present.  Facet ligamentum flavum hypertrophy noted.  Mild spinal canal stenosis with mild left greater than right inferior neural foraminal narrowing.  L5-S1: No significant posterior disc bulge or spinal canal stenosis.  Facet degenerative hypertrophy present with mild left-sided neural foraminal narrowing.    Impression Mild degenerative changes as above without high-grade spinal canal stenosis or neural foraminal narrowing.        Results for orders placed during the hospital encounter of 09/15/15   CT Lumbar Spine Without Contrast    Narrative CT of lumbar spine  History: Back pain  Technique: Standard lumbar spine CT protocol was performed without IV contrast.  Finding: Vertebral body heights and alignment are within normal limits.  Mild narrowing at L5-S1 intervertebral disc space with mild central disc bulge.  There is a moderate circumferential bulge at the L4/L5 level effacing the thecal sac.  Remaining   intervertebral discs are within normal limits.  Prevertebral soft tissues are normal.  Visualized abdominal organs are unremarkable.    Impression      Mild  "degenerative change with disc bulges at L4-L5 and L5-S1.       Review of Systems:  CONSTITUTIONAL: patient denies any fever, chills, or weight loss  SKIN: patient denies any rash or itching  RESPIRATORY: patient denies having any shortness of breath  GASTROINTESTINAL: patient reports having stool incontinence with some leakage  GENITOURINARY: patient denies having any abnormal bladder function    MUSCULOSKELETAL:  - patient complains of the above noted pain/s (see chief pain complaint)    NEUROLOGICAL:   - pain as above  - strength in Lower extremities is intact, BILATERALLY  - sensation in Lower extremities is intact, BILATERALLY  - patient reports having stool incontinence     PSYCHIATRIC: patient denies any change in mood    Other:  All other systems reviewed and are negative        Physical Exam:  Vitals:  /78 (BP Location: Right arm, Patient Position: Sitting)   Pulse 76   Resp 17   Ht 5' 3" (1.6 m)   Wt 86.2 kg (190 lb 0.6 oz)   LMP 01/01/1985 (Approximate) Comment: 1985  BMI 33.66 kg/m²   (reviewed on 3/18/2019)    General: alert and oriented, in no apparent distress.  Gait: normal gait.  Skin: no rashes, no discoloration, no obvious lesions  HEENT: normocephalic, atraumatic. Pupils equal and round.  Cardiovascular: no significant peripheral edema present.  Respiratory: without use of accessory muscles of respiration.    Musculoskeletal - Lumbar Spine:  - Pain on flexion of lumbar spine: Absent   - Pain on extension of lumbar spine: Absent      - Lumbar facet loading: Absent   - TTP over the lumbar facet joints: Absent  - TTP over the lumbar paraspinals: Absent  - TTP over the SI joints:  Absent   - TTP over GT bursa: Absent   - Straight Leg Raise: Negative    Hip:  - CIERA: Present on left  - Pain with internal/ external rotation of hip: Present on left  - [facility] supine [facility] flexion) - positive on the left  - Logroll - negative    Neuro - Lower Extremities:  - RLE Strength:     >> " 5/5 strength with right hip flexion/ extension    >> 5/5 strength with right knee flexion/ extension    >> 5/5 strength in right ankle with plantar and dorsiflexion  - LLE Strength:     >> 5/5 strength with left hip flexion/ extension    >> 5/5 strength with knee flexion extension on the left     >> 5/5 strength in left ankle with plantar and dorsiflexion  - BLE Strength: R/L: HF: 5/5, HE: 5/5, KF: 5/5; KE: 5/5; FE: 5/5; FF: 5/5  - Extremity Reflexes: Brisk and symmetric throughout  - Sensory: Sensation to light touch intact bilaterally      Psych:  Mood and affect is appropriate          Assessment  1. 53 y.o. year old patient with PMH of   Past Medical History:   Diagnosis Date    Alcohol abuse     Alcoholic hepatitis     Anemia of chronic disease     Coagulopathy     Encounter for blood transfusion     End stage liver disease     Hypertension     Hyponatremia     Liver cirrhosis     Liver transplant candidate 12/26/2017    Obesity (BMI 30-39.9) 1/5/2016      presenting with pain located in cervical and lumbar spine, bilateral lower extremities,  Left thumb. Diagnoses include:    ICD-10-CM ICD-9-CM   1. Degenerative disc disease, lumbar M51.36 722.52   2. Left hip pain M25.552 719.45   3. Pain of left thumb M79.645 729.5   4. Left hand pain M79.642 729.5   5. Weakness of both lower extremities R29.898 729.89      2. Pain Generators / Etiology :  Cervical spondylosis, lumbar spondylosis, lumbar radiculopathy, left hip pain  3. Failed Meds (E- Effective, NE- Not Effective):  Gabapentin-E, Robaxin-effective  4. Physical Therapy - has been participating since December 2017  5. Psychological comorbidities -  none  6. Anticoagulants / Antiplatelets:  None       Plan:  1. Interventional: None for now. Consider left hip injection in the future    2. Pharmacologic: Continue gabapentin 600 mg TID and Robaxin 750 mg TID.     3. Rehabilitative: Encouraged regular exercise.  Went to first session of PT  2-25-19.    4. Diagnostic: None for now.    5. Follow up: 8 weeks for med refill. 3-4 weeks post-injection    - I discussed the risks, benefits, and alternatives to potential treatment options. All questions and concerns were fully addressed today in clinic. Dr. Frazier was consulted regarding the patient plan and agrees.

## 2019-03-21 RX ORDER — FUROSEMIDE 40 MG/1
TABLET ORAL
Qty: 30 TABLET | Refills: 0 | Status: SHIPPED | OUTPATIENT
Start: 2019-03-21 | End: 2019-05-24 | Stop reason: SDUPTHER

## 2019-03-25 ENCOUNTER — LAB VISIT (OUTPATIENT)
Dept: LAB | Facility: HOSPITAL | Age: 54
End: 2019-03-25
Attending: INTERNAL MEDICINE
Payer: MEDICAID

## 2019-03-25 DIAGNOSIS — Z94.4 LIVER REPLACED BY TRANSPLANT: ICD-10-CM

## 2019-03-25 LAB
ALBUMIN SERPL BCP-MCNC: 3.6 G/DL (ref 3.5–5.2)
ALP SERPL-CCNC: 195 U/L (ref 55–135)
ALT SERPL W/O P-5'-P-CCNC: 26 U/L (ref 10–44)
ANION GAP SERPL CALC-SCNC: 10 MMOL/L (ref 8–16)
AST SERPL-CCNC: 23 U/L (ref 10–40)
BASOPHILS # BLD AUTO: 0.04 K/UL (ref 0–0.2)
BASOPHILS NFR BLD: 0.6 % (ref 0–1.9)
BILIRUB SERPL-MCNC: 0.3 MG/DL (ref 0.1–1)
BUN SERPL-MCNC: 14 MG/DL (ref 6–20)
CALCIUM SERPL-MCNC: 9.7 MG/DL (ref 8.7–10.5)
CHLORIDE SERPL-SCNC: 107 MMOL/L (ref 95–110)
CO2 SERPL-SCNC: 26 MMOL/L (ref 23–29)
CREAT SERPL-MCNC: 0.8 MG/DL (ref 0.5–1.4)
DIFFERENTIAL METHOD: ABNORMAL
EOSINOPHIL # BLD AUTO: 0.1 K/UL (ref 0–0.5)
EOSINOPHIL NFR BLD: 1.8 % (ref 0–8)
ERYTHROCYTE [DISTWIDTH] IN BLOOD BY AUTOMATED COUNT: 16.3 % (ref 11.5–14.5)
EST. GFR  (AFRICAN AMERICAN): >60 ML/MIN/1.73 M^2
EST. GFR  (NON AFRICAN AMERICAN): >60 ML/MIN/1.73 M^2
GLUCOSE SERPL-MCNC: 94 MG/DL (ref 70–110)
HCT VFR BLD AUTO: 39.8 % (ref 37–48.5)
HGB BLD-MCNC: 12 G/DL (ref 12–16)
IMM GRANULOCYTES # BLD AUTO: 0.02 K/UL (ref 0–0.04)
IMM GRANULOCYTES NFR BLD AUTO: 0.3 % (ref 0–0.5)
LYMPHOCYTES # BLD AUTO: 1.9 K/UL (ref 1–4.8)
LYMPHOCYTES NFR BLD: 25.9 % (ref 18–48)
MAGNESIUM SERPL-MCNC: 1.6 MG/DL (ref 1.6–2.6)
MCH RBC QN AUTO: 24.2 PG (ref 27–31)
MCHC RBC AUTO-ENTMCNC: 30.2 G/DL (ref 32–36)
MCV RBC AUTO: 80 FL (ref 82–98)
MONOCYTES # BLD AUTO: 0.5 K/UL (ref 0.3–1)
MONOCYTES NFR BLD: 7 % (ref 4–15)
NEUTROPHILS # BLD AUTO: 4.6 K/UL (ref 1.8–7.7)
NEUTROPHILS NFR BLD: 64.4 % (ref 38–73)
NRBC BLD-RTO: 0 /100 WBC
PLATELET # BLD AUTO: 53 K/UL (ref 150–350)
PMV BLD AUTO: 11 FL (ref 9.2–12.9)
POTASSIUM SERPL-SCNC: 3.9 MMOL/L (ref 3.5–5.1)
PROT SERPL-MCNC: 7 G/DL (ref 6–8.4)
RBC # BLD AUTO: 4.95 M/UL (ref 4–5.4)
SODIUM SERPL-SCNC: 143 MMOL/L (ref 136–145)
WBC # BLD AUTO: 7.18 K/UL (ref 3.9–12.7)

## 2019-03-25 PROCEDURE — 80053 COMPREHEN METABOLIC PANEL: CPT

## 2019-03-25 PROCEDURE — 36415 COLL VENOUS BLD VENIPUNCTURE: CPT

## 2019-03-25 PROCEDURE — 85025 COMPLETE CBC W/AUTO DIFF WBC: CPT

## 2019-03-25 PROCEDURE — 83735 ASSAY OF MAGNESIUM: CPT

## 2019-03-25 PROCEDURE — 80197 ASSAY OF TACROLIMUS: CPT

## 2019-03-26 ENCOUNTER — CLINICAL SUPPORT (OUTPATIENT)
Dept: REHABILITATION | Facility: HOSPITAL | Age: 54
End: 2019-03-26
Payer: MEDICAID

## 2019-03-26 DIAGNOSIS — M47.26 OSTEOARTHRITIS OF SPINE WITH RADICULOPATHY, LUMBAR REGION: ICD-10-CM

## 2019-03-26 DIAGNOSIS — M53.3 SACROILIAC PAIN: ICD-10-CM

## 2019-03-26 DIAGNOSIS — M70.61 TROCHANTERIC BURSITIS OF RIGHT HIP: Primary | ICD-10-CM

## 2019-03-26 DIAGNOSIS — M70.62 TROCHANTERIC BURSITIS OF LEFT HIP: ICD-10-CM

## 2019-03-26 LAB — TACROLIMUS BLD-MCNC: 7.9 NG/ML (ref 5–15)

## 2019-03-26 PROCEDURE — 97140 MANUAL THERAPY 1/> REGIONS: CPT

## 2019-03-26 PROCEDURE — 97162 PT EVAL MOD COMPLEX 30 MIN: CPT

## 2019-03-26 PROCEDURE — 97014 ELECTRIC STIMULATION THERAPY: CPT

## 2019-03-26 NOTE — PLAN OF CARE
"PHYSICAL THERAPY INITIAL OUTPATIENT EVALUATION    Referring Provider:  Dr. Antonio Horan    Diagnosis:       ICD-10-CM ICD-9-CM    1. Trochanteric bursitis of right hip M70.61 726.5    2. Trochanteric bursitis of left hip M70.62 726.5    3. Osteoarthritis of spine with radiculopathy, lumbar region M47.26 721.3    4. Sacroiliac pain M53.3 724.6        Orders:  Evaluate and Treat    Date of Initial Evaluation:  3/26/2019    Orders :  2019    Coding Cycle Visit # 1    SUBJECTIVE:  Patient reports she had a liver transplant in 2017.  Since this surgery, she has had to relearn how to walk.  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  She is forcing it to work.  She states her right leg is overworked because she "drags" her left leg.  She denies any back surgery or stroke.  She reports she is told she should use an assistive device but she would prefer not to.  She currently has back pain, bilateral hip pain and bilateral leg pain.  Her left hip is worse than her right and her right leg is worse than her left.  She takes a lot of pain medicine a day to function.  She has also been given a cream for her knees.     She does not like to sit still.  She moves all day long cooking, laundry, etc.  She states she "pushes through" the pain.  She lives with her daughter and grandchildren.  One of her grandchild is 1 year old (19 pounds), and she does lift her.   She wakes up throughout the night because she is flipping and turning to get comfortable.  She is a light sleeper normally but since surgery she is unable to get comfortable.     She has increased difficulty going up and down steps, walking, sit to stands, bathing, dressing, cooking and doing laundry.    She currently has 0/10 pain in her low back and 8-9/10 pain at its worst.  She describes it as sharp and throbbing.  She has no pain in her right hip.  She currently has 6/10 pain in her left hip and it is a 10/10 at its worst. She " "describes it as two "leonid" parts rubbing and scraping together.  She has no pain in her left leg.  She has 8-9/10 pain in her right leg and it is a 10/10 pain at its worst.  She describes it as burning and pulling.     Past Medical History:   Diagnosis Date    Alcohol abuse     Alcoholic hepatitis     Anemia of chronic disease     Coagulopathy     Encounter for blood transfusion     End stage liver disease     Hypertension     Hyponatremia     Liver cirrhosis     Liver transplant candidate 12/26/2017    Obesity (BMI 30-39.9) 1/5/2016       Patient Active Problem List   Diagnosis    Allergic rhinitis    Anemia of chronic disease    Erosive esophagitis    Essential hypertension    History of abnormal cervical Pap smear    History of Helicobacter pylori infection    Lipoma    Multinodular thyroid    Hypomagnesemia    Liver replaced by transplant    Adrenal cortical steroids causing adverse effect in therapeutic use    At risk for opportunistic infections    Grade II hemorrhoids    Long-term use of immunosuppressant medication    Accelerated liver transplant rejection    Osteoarthritis of spine with radiculopathy, lumbar region    Vitamin D deficiency    Mixed hyperlipidemia    Adhesive capsulitis of both shoulders    SI (sacroiliac) pain    It band syndrome, left    It band syndrome, right    Piriformis syndrome of both sides    Greater trochanteric bursitis of both hips    Spondylosis of lumbar region without myelopathy or radiculopathy    Weakness of both hips    Obesity (BMI 30.0-34.9)         Current Outpatient Medications:     atorvastatin (LIPITOR) 40 MG tablet, Take 1 tablet (40 mg total) by mouth once daily., Disp: 90 tablet, Rfl: 3    azelastine (ASTELIN) 137 mcg (0.1 %) nasal spray, 2 sprays (274 mcg total) by Nasal route 2 (two) times daily as needed for Rhinitis., Disp: 30 mL, Rfl: 5    BIOTIN ORAL, Take by mouth., Disp: , Rfl:     ergocalciferol (ERGOCALCIFEROL) " 50,000 unit Cap, TAKE 1 CAPSULE BY MOUTH EVERY 7 DAYS, Disp: 10 capsule, Rfl: 0    famotidine (PEPCID) 20 MG tablet, Take 1 tablet (20 mg total) by mouth 2 (two) times daily., Disp: 60 tablet, Rfl: 11    furosemide (LASIX) 40 MG tablet, TAKE 1 TABLET(40 MG) BY MOUTH EVERY DAY, Disp: 30 tablet, Rfl: 0    gabapentin (NEURONTIN) 600 MG tablet, TAKE 1 AND 1/2 TABLETS BY MOUTH THREE TIMES DAILY. MAY CAUSE DROWSINESS, Disp: 90 tablet, Rfl: 0    levocetirizine (XYZAL) 5 MG tablet, Take 1 tablet (5 mg total) by mouth every evening., Disp: 30 tablet, Rfl: 11    magnesium oxide (MAG-OX) 400 mg (241.3 mg magnesium) tablet, TAKE 3 TABLETS(1200 MG) BY MOUTH THREE TIMES DAILY, Disp: 270 tablet, Rfl: 0    methocarbamol (ROBAXIN) 750 MG Tab, Take 1 tablet (750 mg total) by mouth 3 (three) times daily as needed (muscle spasms)., Disp: 60 tablet, Rfl: 0    mycophenolate (CELLCEPT) 250 mg Cap, Take 2 capsules (500 mg total) by mouth 2 (two) times daily., Disp: 120 capsule, Rfl: 11    NIFEdipine (PROCARDIA-XL) 60 MG (OSM) 24 hr tablet, Take 1 tablet (60 mg total) by mouth once daily., Disp: 30 tablet, Rfl: 11    tacrolimus (PROGRAF) 1 MG Cap, Take 2 capsules (2 mg total) by mouth every 12 (twelve) hours., Disp: 120 capsule, Rfl: 11    traZODone (DESYREL) 50 MG tablet, Take 0.5 tablets (25 mg total) by mouth every evening., Disp: 15 tablet, Rfl: 11    PATIENT GOAL:  Patient would like to be able to walk right, have less pain and be able to go up and down stairs.      OBJECTIVE:  Pain: 0/10, located low back, described as sharp and throbbing.  0/10 right hip, 6/10 left hip, described as rubbing and scrubbing together, 8-9/10 right leg, described as burning and pulling.    Sensation:  intact to light touch     Lumbar ROM: Forward Bending   Hands to mid thighs      Backwardbending  0 degrees     Sidebend Right  Hand to mid thigh      Sidebend Left   Hand to mid thigh     Rotation Right   30 degrees     Rotation Left   30  degrees    Hip ROM:  Flexion    Limited secondary to weakness and pain     Extension   Limited secondary to weakness and pain     Internal Rotation  NT secondary to pain     External Rotation  NT secondary to pain     Abduction   Limited secondary to weakness and pain     Adduction   NT      Strength:  Hip Flexion   3-/5 right, 2/5 left      Knee Extension  3-/5 right, 2/5 left     Knee Flexion   3-5 right, 2/5 left      Dorsiflexion   3-/5 right, 2/5 left     Hip Abduction:   Functionally tested 2+/5 right, left NT     Hip Extension:   NT secondary to pain         Function: MODIFIED OSWESTRY LOW BACK PAIN DISABILITY QUESTIONNAIRE    The following scores are patient-reported and range from 0-5, with 0 being least impaired and 5 being most impaired.        Eval  Section 1- Pain intensity    4/5   Section 2- Person care  3/5   Section 3 Lifting- Optional  4/5     Section 4  Walking  3/5  Section 5 Sitting   3/5   Section 6 Standing  4/5  Section 7 Sleeping  4/5   Section 8 Social Life   4/5  Section 9 Traveling  3/5   Section 10 Employ/home  2/5    Patient reports 68% disability on the Modified Oswestry Low Back Pain Disability Questionnaire.    Other:  Patient presents with forward flexed trunk, forward flexed neck, wide base of support, slow speed, shortened stride, and unsteady.    ASSESSMENT:  The patient is a 53 y.o. year old female who presents to physical therapy with complaints of low back pain, bilateral hip pain, and bilateral leg pain.  Patient complaints of pain limit her lumbar AROM, hip AROM, and bilateral leg strength.  These impairments also limit her with cooking, laundry, sit to stands, gait, steps, bathing, and dressing.  Patient would benefit from physical therapy for management of low back pain, bilateral hip pain and bilateral leg pain.    Co-morbidities which may impact the plan of care and potentially impede the patient's progress in therapy include:  Osteoarthritis of spine, piriformis  syndrome, spondolyosis, HTN, vitamin D deficiency, obesity, adhesive capsulitis bilateral shoulders, bilateral greater trochanteric bursitis, bilateral IT band syndrome.    The patient's clinical presentation is evolving.  Based on patient's evolving clinical presentation, 9 co-morbidities, and examination of 2 body systems, patient presents with moderate complexity.    Short Term Goals:  (4 weeks)  1.  Patient will demonstrate 40% improved lumbar AROM.  2.  Patient will demonstrate 40% improved hip AROM.  3.  Patient will demonstrate 3/5 bilateral LE strength.  4.  Patient will have 5/10 pain overall.    Long Term Goals:  (8 weeks)  1.  Patient will demonstrate 80% improved lumbar AROM.  2.  Patient will demonstrate 80% improved hip AROM.  3.  Patient will demonstrate 4/5 bilateral LE strength.  4.  Patient will have 2/10 pain overall.    TREATMENT PROVIDED:    Initial evaluation completed.    Manual Therapy:  (15 minutes) Soft Tissue Mobilization (STM)/Myofacial Release (MFR) to right lumbar paraspinals, quadratus femoris, gluteus medius, gluteus minimus, piriformis, quadratus femoris, IT band, hamstring, gastroc and soleus.  Also performed right long leg distraction.    Therapeutic Exercise:  (0 minutes) None performed.    Patient Education: Patient educated on performing pain free activities.        PLAN:  Patient will benefit from physical therapy (2) x/week for (8) weeks including manual therapy, therapeutic exercise, functional activities, modalities, and patient education.    Thank you for this referral.    These services are reasonable and necessary for the conditions set forth above while under my care.

## 2019-03-26 NOTE — PROGRESS NOTES
"PHYSICAL THERAPY INITIAL OUTPATIENT EVALUATION    Referring Provider:  Dr. Antonio Horan    Diagnosis:       ICD-10-CM ICD-9-CM    1. Trochanteric bursitis of right hip M70.61 726.5    2. Trochanteric bursitis of left hip M70.62 726.5    3. Osteoarthritis of spine with radiculopathy, lumbar region M47.26 721.3    4. Sacroiliac pain M53.3 724.6        Orders:  Evaluate and Treat    Date of Initial Evaluation:  3/26/2019    Orders :  2019    Coding Cycle Visit # 1    SUBJECTIVE:  Patient reports she had a liver transplant in 2017.  Since this surgery, she has had to relearn how to walk.  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  She is forcing it to work.  She states her right leg is overworked because she "drags" her left leg.  She denies any back surgery or stroke.  She reports she is told she should use an assistive device but she would prefer not to.  She currently has back pain, bilateral hip pain and bilateral leg pain.  Her left hip is worse than her right and her right leg is worse than her left.  She takes a lot of pain medicine a day to function.  She has also been given a cream for her knees.     She does not like to sit still.  She moves all day long cooking, laundry, etc.  She states she "pushes through" the pain.  She lives with her daughter and grandchildren.  One of her grandchild is 1 year old (19 pounds), and she does lift her.   She wakes up throughout the night because she is flipping and turning to get comfortable.  She is a light sleeper normally but since surgery she is unable to get comfortable.     She has increased difficulty going up and down steps, walking, sit to stands, bathing, dressing, cooking and doing laundry.    She currently has 0/10 pain in her low back and 8-9/10 pain at its worst.  She describes it as sharp and throbbing.  She has no pain in her right hip.  She currently has 6/10 pain in her left hip and it is a 10/10 at its worst. She " "describes it as two "leonid" parts rubbing and scraping together.  She has no pain in her left leg.  She has 8-9/10 pain in her right leg and it is a 10/10 pain at its worst.  She describes it as burning and pulling.     Past Medical History:   Diagnosis Date    Alcohol abuse     Alcoholic hepatitis     Anemia of chronic disease     Coagulopathy     Encounter for blood transfusion     End stage liver disease     Hypertension     Hyponatremia     Liver cirrhosis     Liver transplant candidate 12/26/2017    Obesity (BMI 30-39.9) 1/5/2016       Patient Active Problem List   Diagnosis    Allergic rhinitis    Anemia of chronic disease    Erosive esophagitis    Essential hypertension    History of abnormal cervical Pap smear    History of Helicobacter pylori infection    Lipoma    Multinodular thyroid    Hypomagnesemia    Liver replaced by transplant    Adrenal cortical steroids causing adverse effect in therapeutic use    At risk for opportunistic infections    Grade II hemorrhoids    Long-term use of immunosuppressant medication    Accelerated liver transplant rejection    Osteoarthritis of spine with radiculopathy, lumbar region    Vitamin D deficiency    Mixed hyperlipidemia    Adhesive capsulitis of both shoulders    SI (sacroiliac) pain    It band syndrome, left    It band syndrome, right    Piriformis syndrome of both sides    Greater trochanteric bursitis of both hips    Spondylosis of lumbar region without myelopathy or radiculopathy    Weakness of both hips    Obesity (BMI 30.0-34.9)         Current Outpatient Medications:     atorvastatin (LIPITOR) 40 MG tablet, Take 1 tablet (40 mg total) by mouth once daily., Disp: 90 tablet, Rfl: 3    azelastine (ASTELIN) 137 mcg (0.1 %) nasal spray, 2 sprays (274 mcg total) by Nasal route 2 (two) times daily as needed for Rhinitis., Disp: 30 mL, Rfl: 5    BIOTIN ORAL, Take by mouth., Disp: , Rfl:     ergocalciferol (ERGOCALCIFEROL) " 50,000 unit Cap, TAKE 1 CAPSULE BY MOUTH EVERY 7 DAYS, Disp: 10 capsule, Rfl: 0    famotidine (PEPCID) 20 MG tablet, Take 1 tablet (20 mg total) by mouth 2 (two) times daily., Disp: 60 tablet, Rfl: 11    furosemide (LASIX) 40 MG tablet, TAKE 1 TABLET(40 MG) BY MOUTH EVERY DAY, Disp: 30 tablet, Rfl: 0    gabapentin (NEURONTIN) 600 MG tablet, TAKE 1 AND 1/2 TABLETS BY MOUTH THREE TIMES DAILY. MAY CAUSE DROWSINESS, Disp: 90 tablet, Rfl: 0    levocetirizine (XYZAL) 5 MG tablet, Take 1 tablet (5 mg total) by mouth every evening., Disp: 30 tablet, Rfl: 11    magnesium oxide (MAG-OX) 400 mg (241.3 mg magnesium) tablet, TAKE 3 TABLETS(1200 MG) BY MOUTH THREE TIMES DAILY, Disp: 270 tablet, Rfl: 0    methocarbamol (ROBAXIN) 750 MG Tab, Take 1 tablet (750 mg total) by mouth 3 (three) times daily as needed (muscle spasms)., Disp: 60 tablet, Rfl: 0    mycophenolate (CELLCEPT) 250 mg Cap, Take 2 capsules (500 mg total) by mouth 2 (two) times daily., Disp: 120 capsule, Rfl: 11    NIFEdipine (PROCARDIA-XL) 60 MG (OSM) 24 hr tablet, Take 1 tablet (60 mg total) by mouth once daily., Disp: 30 tablet, Rfl: 11    tacrolimus (PROGRAF) 1 MG Cap, Take 2 capsules (2 mg total) by mouth every 12 (twelve) hours., Disp: 120 capsule, Rfl: 11    traZODone (DESYREL) 50 MG tablet, Take 0.5 tablets (25 mg total) by mouth every evening., Disp: 15 tablet, Rfl: 11    PATIENT GOAL:  Patient would like to be able to walk right, have less pain and be able to go up and down stairs.      OBJECTIVE:  Pain: 0/10, located low back, described as sharp and throbbing.  0/10 right hip, 6/10 left hip, described as rubbing and scrubbing together, 8-9/10 right leg, described as burning and pulling.    Sensation:  intact to light touch     Lumbar ROM: Forward Bending   Hands to mid thighs      Backwardbending  0 degrees     Sidebend Right  Hand to mid thigh      Sidebend Left   Hand to mid thigh     Rotation Right   30 degrees     Rotation Left   30  degrees    Hip ROM:  Flexion    Limited secondary to weakness and pain     Extension   Limited secondary to weakness and pain     Internal Rotation  NT secondary to pain     External Rotation  NT secondary to pain     Abduction   Limited secondary to weakness and pain     Adduction   NT      Strength:  Hip Flexion   3-/5 right, 2/5 left      Knee Extension  3-/5 right, 2/5 left     Knee Flexion   3-5 right, 2/5 left      Dorsiflexion   3-/5 right, 2/5 left     Hip Abduction:   Functionally tested 2+/5 right, left NT     Hip Extension:   NT secondary to pain         Function: MODIFIED OSWESTRY LOW BACK PAIN DISABILITY QUESTIONNAIRE    The following scores are patient-reported and range from 0-5, with 0 being least impaired and 5 being most impaired.        Eval  Section 1- Pain intensity    4/5   Section 2- Person care  3/5   Section 3 Lifting- Optional  4/5     Section 4  Walking  3/5  Section 5 Sitting   3/5   Section 6 Standing  4/5  Section 7 Sleeping  4/5   Section 8 Social Life   4/5  Section 9 Traveling  3/5   Section 10 Employ/home  2/5    Patient reports 68% disability on the Modified Oswestry Low Back Pain Disability Questionnaire.    Other:  Patient presents with forward flexed trunk, forward flexed neck, wide base of support, slow speed, shortened stride, and unsteady.    ASSESSMENT:  The patient is a 53 y.o. year old female who presents to physical therapy with complaints of low back pain, bilateral hip pain, and bilateral leg pain.  Patient complaints of pain limit her lumbar AROM, hip AROM, and bilateral leg strength.  These impairments also limit her with cooking, laundry, sit to stands, gait, steps, bathing, and dressing.  Patient would benefit from physical therapy for management of low back pain, bilateral hip pain and bilateral leg pain.    Co-morbidities which may impact the plan of care and potentially impede the patient's progress in therapy include:  Osteoarthritis of spine, piriformis  syndrome, spondolyosis, HTN, vitamin D deficiency, obesity, adhesive capsulitis bilateral shoulders, bilateral greater trochanteric bursitis, bilateral IT band syndrome.    The patient's clinical presentation is evolving.  Based on patient's evolving clinical presentation, 9 co-morbidities, and examination of 2 body systems, patient presents with moderate complexity.    Short Term Goals:  (4 weeks)  1.  Patient will demonstrate 40% improved lumbar AROM.  2.  Patient will demonstrate 40% improved hip AROM.  3.  Patient will demonstrate 3/5 bilateral LE strength.  4.  Patient will have 5/10 pain overall.    Long Term Goals:  (8 weeks)  1.  Patient will demonstrate 80% improved lumbar AROM.  2.  Patient will demonstrate 80% improved hip AROM.  3.  Patient will demonstrate 4/5 bilateral LE strength.  4.  Patient will have 2/10 pain overall.    TREATMENT PROVIDED:    Initial evaluation completed.    Manual Therapy:  (15 minutes) Soft Tissue Mobilization (STM)/Myofacial Release (MFR) to right lumbar paraspinals, quadratus femoris, gluteus medius, gluteus minimus, piriformis, quadratus femoris, IT band, hamstring, gastroc and soleus.  Also performed right long leg distraction.    Therapeutic Exercise:  (0 minutes) None performed.    Patient Education: Patient educated on performing pain free activities.      Electrical Stimulation: Lumbar Moist Heat Pack x 15 minutes, Interferential to lumbar spine x 15 minutes.      PLAN:  Patient will benefit from physical therapy (2) x/week for (8) weeks including manual therapy, therapeutic exercise, functional activities, modalities, and patient education.    Thank you for this referral.    These services are reasonable and necessary for the conditions set forth above while under my care.

## 2019-03-27 ENCOUNTER — TELEPHONE (OUTPATIENT)
Dept: TRANSPLANT | Facility: CLINIC | Age: 54
End: 2019-03-27

## 2019-03-27 DIAGNOSIS — Z94.4 LIVER REPLACED BY TRANSPLANT: Primary | ICD-10-CM

## 2019-03-27 NOTE — TELEPHONE ENCOUNTER
Dr. Souza reviewed your labs.  Continue routine labs no changes needed.  Letter sent for next lab appointment 04/22/19

## 2019-03-27 NOTE — TELEPHONE ENCOUNTER
----- Message from Joselin Souza MD sent at 3/27/2019  2:26 PM CDT -----  Reviewed, nothing to do; repeat per routine

## 2019-03-28 ENCOUNTER — OFFICE VISIT (OUTPATIENT)
Dept: GASTROENTEROLOGY | Facility: CLINIC | Age: 54
End: 2019-03-28
Payer: MEDICAID

## 2019-03-28 VITALS
WEIGHT: 198 LBS | SYSTOLIC BLOOD PRESSURE: 120 MMHG | BODY MASS INDEX: 35.08 KG/M2 | HEART RATE: 64 BPM | DIASTOLIC BLOOD PRESSURE: 84 MMHG | HEIGHT: 63 IN

## 2019-03-28 DIAGNOSIS — D84.9 IMMUNOSUPPRESSION: Primary | ICD-10-CM

## 2019-03-28 DIAGNOSIS — E83.42 HYPOMAGNESEMIA: ICD-10-CM

## 2019-03-28 DIAGNOSIS — Z94.4 LIVER TRANSPLANTED: ICD-10-CM

## 2019-03-28 PROCEDURE — 99213 OFFICE O/P EST LOW 20 MIN: CPT | Mod: PBBFAC,PN | Performed by: INTERNAL MEDICINE

## 2019-03-28 PROCEDURE — 99999 PR PBB SHADOW E&M-EST. PATIENT-LVL III: ICD-10-PCS | Mod: PBBFAC,,, | Performed by: INTERNAL MEDICINE

## 2019-03-28 PROCEDURE — 99214 OFFICE O/P EST MOD 30 MIN: CPT | Mod: S$PBB,,, | Performed by: INTERNAL MEDICINE

## 2019-03-28 PROCEDURE — 99214 PR OFFICE/OUTPT VISIT, EST, LEVL IV, 30-39 MIN: ICD-10-PCS | Mod: S$PBB,,, | Performed by: INTERNAL MEDICINE

## 2019-03-28 PROCEDURE — 99999 PR PBB SHADOW E&M-EST. PATIENT-LVL III: CPT | Mod: PBBFAC,,, | Performed by: INTERNAL MEDICINE

## 2019-03-28 RX ORDER — DICLOFENAC SODIUM 100 %
1 POWDER (GRAM) MISCELLANEOUS DAILY PRN
COMMUNITY
Start: 2019-03-19 | End: 2019-09-23

## 2019-03-28 RX ORDER — LANOLIN ALCOHOL/MO/W.PET/CERES
800 CREAM (GRAM) TOPICAL 2 TIMES DAILY
Qty: 120 TABLET | Refills: 5
Start: 2019-03-28 | End: 2019-09-16

## 2019-03-28 NOTE — PROGRESS NOTES
Transplant Hepatology  Liver Transplant Recipient Follow-up    Transplant Date: 2017  [facility] Native Liver Dx: Alcoholic Cirrhosis    Marcie is here for follow up of her liver transplant.    ORGAN: LIVER  Whole or Partial: whole liver  Donor Type:  - brain death  CDC High Risk: no  Donor CMV Status: Negative  Donor HCV Status: Negative  Donor HBcAb: Negative  Biliary Anastomosis: end to end  Arterial Anatomy: standard  IVC reconstruction: cavaplasty piggyback  Portal vein status: patent  .    Subjective:     Interval History:  Currently, she is doing adequately. Current complaints include issues with gaining weight.  She would like to focus on trying to lose weight.  She continues have chronic musculoskeletal issues which she is plugged into physical therapy.  Otherwise her liver has been doing well without any major issues.    Review of Systems    Objective:     Physical Exam   Constitutional: She is oriented to person, place, and time. She appears well-developed and well-nourished. No distress.   HENT:   Head: Normocephalic and atraumatic.   Mouth/Throat: Oropharynx is clear and moist. No oropharyngeal exudate.   Eyes: Pupils are equal, round, and reactive to light. Conjunctivae are normal. Right eye exhibits no discharge. Left eye exhibits no discharge. No scleral icterus.   Pulmonary/Chest: Effort normal and breath sounds normal. No respiratory distress. She has no wheezes.   Abdominal: Soft. She exhibits no distension. There is no tenderness.   Musculoskeletal: She exhibits no edema.   Neurological: She is alert and oriented to person, place, and time.   Psychiatric: She has a normal mood and affect. Her behavior is normal.   Vitals reviewed.      WBC   Date Value Ref Range Status   2019 7.18 3.90 - 12.70 K/uL Final     Hemoglobin   Date Value Ref Range Status   2019 12.0 12.0 - 16.0 g/dL Final     POC Hematocrit   Date Value Ref Range Status   2017 28 (L) 36 - 54 %PCV Final      Hematocrit   Date Value Ref Range Status   03/25/2019 39.8 37.0 - 48.5 % Final     Platelets   Date Value Ref Range Status   03/25/2019 53 (L) 150 - 350 K/uL Final     BUN, Bld   Date Value Ref Range Status   03/25/2019 14 6 - 20 mg/dL Final     Creatinine   Date Value Ref Range Status   03/25/2019 0.8 0.5 - 1.4 mg/dL Final     Glucose   Date Value Ref Range Status   03/25/2019 94 70 - 110 mg/dL Final     Calcium   Date Value Ref Range Status   03/25/2019 9.7 8.7 - 10.5 mg/dL Final     Sodium   Date Value Ref Range Status   03/25/2019 143 136 - 145 mmol/L Final     Potassium   Date Value Ref Range Status   03/25/2019 3.9 3.5 - 5.1 mmol/L Final     Chloride   Date Value Ref Range Status   03/25/2019 107 95 - 110 mmol/L Final     Magnesium   Date Value Ref Range Status   03/25/2019 1.6 1.6 - 2.6 mg/dL Final     AST   Date Value Ref Range Status   03/25/2019 23 10 - 40 U/L Final     ALT   Date Value Ref Range Status   03/25/2019 26 10 - 44 U/L Final     Alkaline Phosphatase   Date Value Ref Range Status   03/25/2019 195 (H) 55 - 135 U/L Final     Total Bilirubin   Date Value Ref Range Status   03/25/2019 0.3 0.1 - 1.0 mg/dL Final     Comment:     For infants and newborns, interpretation of results should be based  on gestational age, weight and in agreement with clinical  observations.  Premature Infant recommended reference ranges:  Up to 24 hours.............<8.0 mg/dL  Up to 48 hours............<12.0 mg/dL  3-5 days..................<15.0 mg/dL  6-29 days.................<15.0 mg/dL       Albumin   Date Value Ref Range Status   03/25/2019 3.6 3.5 - 5.2 g/dL Final     INR   Date Value Ref Range Status   03/09/2018 1.0 0.8 - 1.2 Final     Comment:     Coumadin Therapy:  2.0 - 3.0 for INR for all indicators except mechanical heart valves  and antiphospholipid syndromes which should use 2.5 - 3.5.       Lab Results   Component Value Date    TACROLIMUS 7.9 03/25/2019           Assessment/Plan:     1.  Immunosuppression    2. Hypomagnesemia    3. Liver transplanted      Immunosuppression  -discontinue CellCept  -check labs in 2 weeks to monitor liver test with decreased immunosuppression    Hypomagnesemia  -will try to wean down magnesium  -check magnesium with next set of labs  -     magnesium oxide (MAG-OX) 400 mg (241.3 mg magnesium) tablet; Take 2 tablets (800 mg total) by mouth 2 (two) times daily.  Dispense: 120 tablet; Refill: 5    Liver transplanted-good allograft function  -advise continued PCP follow-up seems she is next due in September    Return to clinic in 6 months    Patient was seen in the liver transplant department at [facility].      Joselin Souza MD           [facility] Patient Status  Functional Status: 70% - Cares for self: unable to carry on normal activity or active work  Physical Capacity: No Limitations

## 2019-04-01 ENCOUNTER — OFFICE VISIT (OUTPATIENT)
Dept: OPHTHALMOLOGY | Facility: CLINIC | Age: 54
End: 2019-04-01
Payer: MEDICAID

## 2019-04-01 DIAGNOSIS — H52.4 HYPEROPIA OF BOTH EYES WITH ASTIGMATISM AND PRESBYOPIA: ICD-10-CM

## 2019-04-01 DIAGNOSIS — H52.203 HYPEROPIA OF BOTH EYES WITH ASTIGMATISM AND PRESBYOPIA: ICD-10-CM

## 2019-04-01 DIAGNOSIS — H04.123 DRY EYES, BILATERAL: Primary | ICD-10-CM

## 2019-04-01 DIAGNOSIS — Z13.5 SCREENING FOR GLAUCOMA: ICD-10-CM

## 2019-04-01 DIAGNOSIS — H52.4 PRESBYOPIA: ICD-10-CM

## 2019-04-01 DIAGNOSIS — H52.03 HYPEROPIA OF BOTH EYES WITH ASTIGMATISM AND PRESBYOPIA: ICD-10-CM

## 2019-04-01 PROCEDURE — 99999 PR PBB SHADOW E&M-EST. PATIENT-LVL I: CPT | Mod: PBBFAC,,, | Performed by: OPTOMETRIST

## 2019-04-01 PROCEDURE — 99999 PR PBB SHADOW E&M-EST. PATIENT-LVL I: ICD-10-PCS | Mod: PBBFAC,,, | Performed by: OPTOMETRIST

## 2019-04-01 PROCEDURE — 99211 OFF/OP EST MAY X REQ PHY/QHP: CPT | Mod: PBBFAC,PN | Performed by: OPTOMETRIST

## 2019-04-01 PROCEDURE — 92015 PR REFRACTION: ICD-10-PCS | Mod: ,,, | Performed by: OPTOMETRIST

## 2019-04-01 PROCEDURE — 92014 COMPRE OPH EXAM EST PT 1/>: CPT | Mod: S$PBB,,, | Performed by: OPTOMETRIST

## 2019-04-01 PROCEDURE — 92014 PR EYE EXAM, EST PATIENT,COMPREHESV: ICD-10-PCS | Mod: S$PBB,,, | Performed by: OPTOMETRIST

## 2019-04-01 PROCEDURE — 92015 DETERMINE REFRACTIVE STATE: CPT | Mod: ,,, | Performed by: OPTOMETRIST

## 2019-04-01 NOTE — PROGRESS NOTES
HPI     Last [facility] exam 03/20/2018  Screening for glaucoma  Liver transplant   RE  Decreased distance and near vision   Eyes are becoming increasingly watery  Patient had stye inner lower eyelid OS which she burst on Friday   Patient is not experiencing any discomfort at this time      Last edited by Sandip Alex MA on 4/1/2019 10:18 AM. (History)            Assessment /Plan     For exam results, see Encounter Report.    Dry eyes, bilateral    Screening for glaucoma    Hyperopia of both eyes with astigmatism and presbyopia    Presbyopia      Dry eyes = Systane Balance prn.  OH OK OU otherwise.  Spec Rx given.  RTC one year or prn.

## 2019-04-02 ENCOUNTER — CLINICAL SUPPORT (OUTPATIENT)
Dept: REHABILITATION | Facility: HOSPITAL | Age: 54
End: 2019-04-02
Payer: MEDICAID

## 2019-04-02 DIAGNOSIS — M70.62 TROCHANTERIC BURSITIS OF LEFT HIP: ICD-10-CM

## 2019-04-02 DIAGNOSIS — M70.61 TROCHANTERIC BURSITIS OF RIGHT HIP: Primary | ICD-10-CM

## 2019-04-02 DIAGNOSIS — M47.26 OSTEOARTHRITIS OF SPINE WITH RADICULOPATHY, LUMBAR REGION: ICD-10-CM

## 2019-04-02 DIAGNOSIS — M53.3 SACROILIAC PAIN: ICD-10-CM

## 2019-04-02 PROCEDURE — 97140 MANUAL THERAPY 1/> REGIONS: CPT

## 2019-04-02 PROCEDURE — 97110 THERAPEUTIC EXERCISES: CPT

## 2019-04-02 PROCEDURE — 97014 ELECTRIC STIMULATION THERAPY: CPT

## 2019-04-02 NOTE — PROGRESS NOTES
"PHYSICAL THERAPY OUTPATIENT TREATMENT    Referring Provider:  Dr. Antonio Horan    Diagnosis:       ICD-10-CM ICD-9-CM    1. Trochanteric bursitis of right hip M70.61 726.5    2. Trochanteric bursitis of left hip M70.62 726.5    3. Osteoarthritis of spine with radiculopathy, lumbar region M47.26 721.3    4. Sacroiliac pain M53.3 724.6        Orders:  Evaluate and Treat    Date of Initial Evaluation:  3/26/2019    Date of Current Treatment: 2019    Orders :  2019    Coding Cycle Visit # 2    SUBJECTIVE: Daily: Patient states she has 9/10 pain in low back and right leg.  She reports today was a good day and she wasn't having any pain.  After she started riding the stationary bike, she began having back pain and pain into right leg.  She is used to pain and wanting to push through some of it.    Initial: Patient reports she had a liver transplant in 2017.  Since this surgery, she has had to relearn how to walk.  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  She is forcing it to work.  She states her right leg is overworked because she "drags" her left leg.  She denies any back surgery or stroke.  She reports she is told she should use an assistive device but she would prefer not to.  She currently has back pain, bilateral hip pain and bilateral leg pain.  Her left hip is worse than her right and her right leg is worse than her left.  She takes a lot of pain medicine a day to function.  She has also been given a cream for her knees.     She does not like to sit still.  She moves all day long cooking, laundry, etc.  She states she "pushes through" the pain.  She lives with her daughter and grandchildren.  One of her grandchild is 1 year old (19 pounds), and she does lift her.   She wakes up throughout the night because she is flipping and turning to get comfortable.  She is a light sleeper normally but since surgery she is unable to get comfortable.     She has increased " "difficulty going up and down steps, walking, sit to stands, bathing, dressing, cooking and doing laundry.    She currently has 0/10 pain in her low back and 8-9/10 pain at its worst.  She describes it as sharp and throbbing.  She has no pain in her right hip.  She currently has 6/10 pain in her left hip and it is a 10/10 at its worst. She describes it as two "leonid" parts rubbing and scraping together.  She has no pain in her left leg.  She has 8-9/10 pain in her right leg and it is a 10/10 pain at its worst.  She describes it as burning and pulling.     Past Medical History:   Diagnosis Date    Alcohol abuse     Alcoholic hepatitis     Anemia of chronic disease     Coagulopathy     Encounter for blood transfusion     End stage liver disease     Hypertension     Hyponatremia     Liver cirrhosis     Liver transplant candidate 12/26/2017    Obesity (BMI 30-39.9) 1/5/2016       Patient Active Problem List   Diagnosis    Allergic rhinitis    Anemia of chronic disease    Erosive esophagitis    Essential hypertension    History of abnormal cervical Pap smear    History of Helicobacter pylori infection    Lipoma    Multinodular thyroid    Hypomagnesemia    Liver replaced by transplant    Adrenal cortical steroids causing adverse effect in therapeutic use    At risk for opportunistic infections    Grade II hemorrhoids    Long-term use of immunosuppressant medication    Accelerated liver transplant rejection    Osteoarthritis of spine with radiculopathy, lumbar region    Vitamin D deficiency    Mixed hyperlipidemia    Adhesive capsulitis of both shoulders    SI (sacroiliac) pain    It band syndrome, left    It band syndrome, right    Piriformis syndrome of both sides    Greater trochanteric bursitis of both hips    Spondylosis of lumbar region without myelopathy or radiculopathy    Weakness of both hips    Obesity (BMI 30.0-34.9)         Current Outpatient Medications:     atorvastatin " (LIPITOR) 40 MG tablet, Take 1 tablet (40 mg total) by mouth once daily., Disp: 90 tablet, Rfl: 3    azelastine (ASTELIN) 137 mcg (0.1 %) nasal spray, 2 sprays (274 mcg total) by Nasal route 2 (two) times daily as needed for Rhinitis., Disp: 30 mL, Rfl: 5    BIOTIN ORAL, Take by mouth., Disp: , Rfl:     diclofenac sodium, bulk, 100 % Powd, Apply 1 application topically daily as needed., Disp: , Rfl:     ergocalciferol (ERGOCALCIFEROL) 50,000 unit Cap, TAKE 1 CAPSULE BY MOUTH EVERY 7 DAYS, Disp: 10 capsule, Rfl: 0    famotidine (PEPCID) 20 MG tablet, Take 1 tablet (20 mg total) by mouth 2 (two) times daily., Disp: 60 tablet, Rfl: 11    furosemide (LASIX) 40 MG tablet, TAKE 1 TABLET(40 MG) BY MOUTH EVERY DAY, Disp: 30 tablet, Rfl: 0    gabapentin (NEURONTIN) 600 MG tablet, TAKE 1 AND 1/2 TABLETS BY MOUTH THREE TIMES DAILY. MAY CAUSE DROWSINESS, Disp: 90 tablet, Rfl: 0    levocetirizine (XYZAL) 5 MG tablet, Take 1 tablet (5 mg total) by mouth every evening., Disp: 30 tablet, Rfl: 11    magnesium oxide (MAG-OX) 400 mg (241.3 mg magnesium) tablet, Take 2 tablets (800 mg total) by mouth 2 (two) times daily., Disp: 120 tablet, Rfl: 5    methocarbamol (ROBAXIN) 750 MG Tab, Take 1 tablet (750 mg total) by mouth 3 (three) times daily as needed (muscle spasms)., Disp: 60 tablet, Rfl: 0    NIFEdipine (PROCARDIA-XL) 60 MG (OSM) 24 hr tablet, Take 1 tablet (60 mg total) by mouth once daily., Disp: 30 tablet, Rfl: 11    tacrolimus (PROGRAF) 1 MG Cap, Take 2 capsules (2 mg total) by mouth every 12 (twelve) hours., Disp: 120 capsule, Rfl: 11    traZODone (DESYREL) 50 MG tablet, Take 0.5 tablets (25 mg total) by mouth every evening., Disp: 15 tablet, Rfl: 11    PATIENT GOAL:  Patient would like to be able to walk right, have less pain and be able to go up and down stairs.      OBJECTIVE:  Pain: 9/10, located low back, described as sharp and throbbing.  9/10 right hip, 6/10 left hip, described as rubbing and scrubbing  together, 8-9/10 right leg, described as burning and pulling.    Sensation:  intact to light touch     Lumbar ROM: Forward Bending   Hands to mid thighs      Backwardbending  0 degrees     Sidebend Right  Hand to mid thigh      Sidebend Left   Hand to mid thigh     Rotation Right   30 degrees     Rotation Left   30 degrees    Hip ROM:  Flexion    Limited secondary to weakness and pain     Extension   Limited secondary to weakness and pain     Internal Rotation  NT secondary to pain     External Rotation  NT secondary to pain     Abduction   Limited secondary to weakness and pain     Adduction   NT      Strength:  Hip Flexion   3-/5 right, 2/5 left      Knee Extension  3-/5 right, 2/5 left     Knee Flexion   3-5 right, 2/5 left      Dorsiflexion   3-/5 right, 2/5 left     Hip Abduction:   Functionally tested 2+/5 right, left NT     Hip Extension:   NT secondary to pain         Function: MODIFIED OSWESTRY LOW BACK PAIN DISABILITY QUESTIONNAIRE    The following scores are patient-reported and range from 0-5, with 0 being least impaired and 5 being most impaired.        Eval  Section 1- Pain intensity    4/5   Section 2- Person care  3/5   Section 3 Lifting- Optional  4/5     Section 4  Walking  3/5  Section 5 Sitting   3/5   Section 6 Standing  4/5  Section 7 Sleeping  4/5   Section 8 Social Life   4/5  Section 9 Traveling  3/5   Section 10 Employ/home  2/5    Patient reports 68% disability on the Modified Oswestry Low Back Pain Disability Questionnaire.    Other:  Patient presents with forward flexed trunk, forward flexed neck, wide base of support, slow speed, shortened stride, and unsteady.    ASSESSMENT: Daily: Patient required multiple rest breaks during stationary bike exercise.  Patient demonstrated poor coordination and motor control with bilateral hamstring stretches.  Patient tolerated all exercises well.  Patient required verbal cues to keep exercises pain free.    Initial:  The patient is a 53 y.o. year old  female who presents to physical therapy with complaints of low back pain, bilateral hip pain, and bilateral leg pain.  Patient complaints of pain limit her lumbar AROM, hip AROM, and bilateral leg strength.  These impairments also limit her with cooking, laundry, sit to stands, gait, steps, bathing, and dressing.  Patient would benefit from physical therapy for management of low back pain, bilateral hip pain and bilateral leg pain.    Co-morbidities which may impact the plan of care and potentially impede the patient's progress in therapy include:  Osteoarthritis of spine, piriformis syndrome, spondolyosis, HTN, vitamin D deficiency, obesity, adhesive capsulitis bilateral shoulders, bilateral greater trochanteric bursitis, bilateral IT band syndrome.    The patient's clinical presentation is evolving.  Based on patient's evolving clinical presentation, 9 co-morbidities, and examination of 2 body systems, patient presents with moderate complexity.    Short Term Goals:  (4 weeks)  1.  Patient will demonstrate 40% improved lumbar AROM.  2.  Patient will demonstrate 40% improved hip AROM.  3.  Patient will demonstrate 3/5 bilateral LE strength.  4.  Patient will have 5/10 pain overall.    Long Term Goals:  (8 weeks)  1.  Patient will demonstrate 80% improved lumbar AROM.  2.  Patient will demonstrate 80% improved hip AROM.  3.  Patient will demonstrate 4/5 bilateral LE strength.  4.  Patient will have 2/10 pain overall.    TREATMENT PROVIDED:    Initial evaluation completed.    Manual Therapy:  (25 minutes) Soft Tissue Mobilization (STM)/Myofacial Release (MFR) to bilateral lumbar paraspinals, quadratus femoris, gluteus medius, gluteus minimus, piriformis, quadratus femoris, IT band, and hamstring.  Also performed MET to pelvis using bilateral hip abductors and adductors.    Therapeutic Exercise:  (25 minutes)   Stationary Bike: 5 minutes, level 1   Hamstring Stretch with Strap: 2 minutes bilateral (4 minutes  total)   Lower Trunk Rotation (LTR): 3 minutes   Piriformis Stretch: 2 minutes bilateral (4 minutes total)   Abdominal Bracing: 3 minutes total   Gluteal Sets: 3 minutes total   Supine Hip Adduction with Ball: 3 minutes    Deferred Today:     Supine Hip Abduction with Theraband: 3 minutes, yellow theraband  Patient Education: Patient educated on performing pain free activities.      Electrical Stimulation: Lumbar Moist Heat Pack x 15 minutes, Interferential to lumbar spine x 15 minutes.      PLAN:  Patient will benefit from physical therapy (2) x/week for (8) weeks including manual therapy, therapeutic exercise, functional activities, modalities, and patient education.    Thank you for this referral.    These services are reasonable and necessary for the conditions set forth above while under my care.

## 2019-04-08 ENCOUNTER — CLINICAL SUPPORT (OUTPATIENT)
Dept: REHABILITATION | Facility: HOSPITAL | Age: 54
End: 2019-04-08
Payer: MEDICAID

## 2019-04-08 DIAGNOSIS — R53.81 PHYSICAL DECONDITIONING: ICD-10-CM

## 2019-04-08 DIAGNOSIS — M53.3 SACROILIAC PAIN: ICD-10-CM

## 2019-04-08 DIAGNOSIS — M47.26 OSTEOARTHRITIS OF SPINE WITH RADICULOPATHY, LUMBAR REGION: ICD-10-CM

## 2019-04-08 DIAGNOSIS — M70.61 TROCHANTERIC BURSITIS OF RIGHT HIP: Primary | ICD-10-CM

## 2019-04-08 DIAGNOSIS — M54.32 SCIATICA, LEFT SIDE: ICD-10-CM

## 2019-04-08 DIAGNOSIS — M70.62 TROCHANTERIC BURSITIS OF LEFT HIP: ICD-10-CM

## 2019-04-08 DIAGNOSIS — R53.1 WEAKNESS: ICD-10-CM

## 2019-04-08 PROCEDURE — 97110 THERAPEUTIC EXERCISES: CPT

## 2019-04-08 PROCEDURE — 97140 MANUAL THERAPY 1/> REGIONS: CPT

## 2019-04-08 NOTE — PROGRESS NOTES
"PHYSICAL THERAPY OUTPATIENT TREATMENT    Referring Provider:  Dr. Antonio Horan    Diagnosis:       ICD-10-CM ICD-9-CM    1. Trochanteric bursitis of right hip M70.61 726.5    2. Trochanteric bursitis of left hip M70.62 726.5    3. Osteoarthritis of spine with radiculopathy, lumbar region M47.26 721.3    4. Sacroiliac pain M53.3 724.6    5. Weakness R53.1 780.79    6. Physical deconditioning R53.81 799.3    7. Sciatica, left side M54.32 724.3        Orders:  Evaluate and Treat    Date of Initial Evaluation:  3/26/2019    Date of Current Treatment: 2019    Orders :  2019    Coding Cycle Visit # 3    SUBJECTIVE: Daily: Patient states she had a "pretty good week" last week.  She denies increased pain.  She states she tolerated therapy well.    Initial: Patient reports she had a liver transplant in 2017.  Since this surgery, she has had to relearn how to walk.  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  She is forcing it to work.  She states her right leg is overworked because she "drags" her left leg.  She denies any back surgery or stroke.  She reports she is told she should use an assistive device but she would prefer not to.  She currently has back pain, bilateral hip pain and bilateral leg pain.  Her left hip is worse than her right and her right leg is worse than her left.  She takes a lot of pain medicine a day to function.  She has also been given a cream for her knees.     She does not like to sit still.  She moves all day long cooking, laundry, etc.  She states she "pushes through" the pain.  She lives with her daughter and grandchildren.  One of her grandchild is 1 year old (19 pounds), and she does lift her.   She wakes up throughout the night because she is flipping and turning to get comfortable.  She is a light sleeper normally but since surgery she is unable to get comfortable.     She has increased difficulty going up and down steps, walking, sit to stands, " "bathing, dressing, cooking and doing laundry.    She currently has 0/10 pain in her low back and 8-9/10 pain at its worst.  She describes it as sharp and throbbing.  She has no pain in her right hip.  She currently has 6/10 pain in her left hip and it is a 10/10 at its worst. She describes it as two "leonid" parts rubbing and scraping together.  She has no pain in her left leg.  She has 8-9/10 pain in her right leg and it is a 10/10 pain at its worst.  She describes it as burning and pulling.     Past Medical History:   Diagnosis Date    Alcohol abuse     Alcoholic hepatitis     Anemia of chronic disease     Coagulopathy     Encounter for blood transfusion     End stage liver disease     Hypertension     Hyponatremia     Liver cirrhosis     Liver transplant candidate 12/26/2017    Obesity (BMI 30-39.9) 1/5/2016       Patient Active Problem List   Diagnosis    Allergic rhinitis    Anemia of chronic disease    Erosive esophagitis    Essential hypertension    History of abnormal cervical Pap smear    History of Helicobacter pylori infection    Lipoma    Multinodular thyroid    Hypomagnesemia    Liver replaced by transplant    Adrenal cortical steroids causing adverse effect in therapeutic use    At risk for opportunistic infections    Grade II hemorrhoids    Long-term use of immunosuppressant medication    Accelerated liver transplant rejection    Osteoarthritis of spine with radiculopathy, lumbar region    Vitamin D deficiency    Mixed hyperlipidemia    Adhesive capsulitis of both shoulders    SI (sacroiliac) pain    It band syndrome, left    It band syndrome, right    Piriformis syndrome of both sides    Greater trochanteric bursitis of both hips    Spondylosis of lumbar region without myelopathy or radiculopathy    Weakness of both hips    Obesity (BMI 30.0-34.9)         Current Outpatient Medications:     atorvastatin (LIPITOR) 40 MG tablet, Take 1 tablet (40 mg total) by mouth " once daily., Disp: 90 tablet, Rfl: 3    azelastine (ASTELIN) 137 mcg (0.1 %) nasal spray, 2 sprays (274 mcg total) by Nasal route 2 (two) times daily as needed for Rhinitis., Disp: 30 mL, Rfl: 5    BIOTIN ORAL, Take by mouth., Disp: , Rfl:     diclofenac sodium, bulk, 100 % Powd, Apply 1 application topically daily as needed., Disp: , Rfl:     ergocalciferol (ERGOCALCIFEROL) 50,000 unit Cap, TAKE 1 CAPSULE BY MOUTH EVERY 7 DAYS, Disp: 10 capsule, Rfl: 0    famotidine (PEPCID) 20 MG tablet, Take 1 tablet (20 mg total) by mouth 2 (two) times daily., Disp: 60 tablet, Rfl: 11    furosemide (LASIX) 40 MG tablet, TAKE 1 TABLET(40 MG) BY MOUTH EVERY DAY, Disp: 30 tablet, Rfl: 0    gabapentin (NEURONTIN) 600 MG tablet, TAKE 1 AND 1/2 TABLETS BY MOUTH THREE TIMES DAILY. MAY CAUSE DROWSINESS, Disp: 90 tablet, Rfl: 0    levocetirizine (XYZAL) 5 MG tablet, Take 1 tablet (5 mg total) by mouth every evening., Disp: 30 tablet, Rfl: 11    magnesium oxide (MAG-OX) 400 mg (241.3 mg magnesium) tablet, Take 2 tablets (800 mg total) by mouth 2 (two) times daily., Disp: 120 tablet, Rfl: 5    methocarbamol (ROBAXIN) 750 MG Tab, Take 1 tablet (750 mg total) by mouth 3 (three) times daily as needed (muscle spasms)., Disp: 60 tablet, Rfl: 0    NIFEdipine (PROCARDIA-XL) 60 MG (OSM) 24 hr tablet, Take 1 tablet (60 mg total) by mouth once daily., Disp: 30 tablet, Rfl: 11    tacrolimus (PROGRAF) 1 MG Cap, Take 2 capsules (2 mg total) by mouth every 12 (twelve) hours., Disp: 120 capsule, Rfl: 11    traZODone (DESYREL) 50 MG tablet, Take 0.5 tablets (25 mg total) by mouth every evening., Disp: 15 tablet, Rfl: 11    PATIENT GOAL:  Patient would like to be able to walk right, have less pain and be able to go up and down stairs.      OBJECTIVE:  Pain: 0/10, located low back, described as sharp and throbbing.  9/10 right hip, 6/10 left hip, described as rubbing and scrubbing together, 9/10 right leg, described as burning and  pulling.    Sensation:  intact to light touch     Lumbar ROM: Forward Bending   Hands to mid thighs      Backwardbending  0 degrees     Sidebend Right  Hand to mid thigh      Sidebend Left   Hand to mid thigh     Rotation Right   30 degrees     Rotation Left   30 degrees    Hip ROM:  Flexion    Limited secondary to weakness and pain     Extension   Limited secondary to weakness and pain     Internal Rotation  NT secondary to pain     External Rotation  NT secondary to pain     Abduction   Limited secondary to weakness and pain     Adduction   NT      Strength:  Hip Flexion   3-/5 right, 2/5 left      Knee Extension  3-/5 right, 2/5 left     Knee Flexion   3-5 right, 2/5 left      Dorsiflexion   3-/5 right, 2/5 left     Hip Abduction:   Functionally tested 2+/5 right, left NT     Hip Extension:   NT secondary to pain         Function: MODIFIED OSWESTRY LOW BACK PAIN DISABILITY QUESTIONNAIRE    The following scores are patient-reported and range from 0-5, with 0 being least impaired and 5 being most impaired.        Eval  Section 1- Pain intensity    4/5   Section 2- Person care  3/5   Section 3 Lifting- Optional  4/5     Section 4  Walking  3/5  Section 5 Sitting   3/5   Section 6 Standing  4/5  Section 7 Sleeping  4/5   Section 8 Social Life   4/5  Section 9 Traveling  3/5   Section 10 Employ/home  2/5    Patient reports 68% disability on the Modified Oswestry Low Back Pain Disability Questionnaire.    Other:  Patient presents with forward flexed trunk, forward flexed neck, wide base of support, slow speed, shortened stride, and unsteady.    ASSESSMENT: Daily: Patient required multiple rest breaks during stationary bike exercise; recommended to patient to use abdominal bracing while biking to help with posture and pain.  Patient demonstrates increased tissue tension noted in right IT band, hamstring, quad and gluteus medius, gluteus minimus and piriformis. Added IT band stretching today.  Patient demonstrates better  coordination and control with hamstring and IT band stretching.    Initial:  The patient is a 53 y.o. year old female who presents to physical therapy with complaints of low back pain, bilateral hip pain, and bilateral leg pain.  Patient complaints of pain limit her lumbar AROM, hip AROM, and bilateral leg strength.  These impairments also limit her with cooking, laundry, sit to stands, gait, steps, bathing, and dressing.  Patient would benefit from physical therapy for management of low back pain, bilateral hip pain and bilateral leg pain.    Co-morbidities which may impact the plan of care and potentially impede the patient's progress in therapy include:  Osteoarthritis of spine, piriformis syndrome, spondolyosis, HTN, vitamin D deficiency, obesity, adhesive capsulitis bilateral shoulders, bilateral greater trochanteric bursitis, bilateral IT band syndrome.    The patient's clinical presentation is evolving.  Based on patient's evolving clinical presentation, 9 co-morbidities, and examination of 2 body systems, patient presents with moderate complexity.    Short Term Goals:  (4 weeks)  1.  Patient will demonstrate 40% improved lumbar AROM.  2.  Patient will demonstrate 40% improved hip AROM.  3.  Patient will demonstrate 3/5 bilateral LE strength.  4.  Patient will have 5/10 pain overall.    Long Term Goals:  (8 weeks)  1.  Patient will demonstrate 80% improved lumbar AROM.  2.  Patient will demonstrate 80% improved hip AROM.  3.  Patient will demonstrate 4/5 bilateral LE strength.  4.  Patient will have 2/10 pain overall.    TREATMENT PROVIDED:    Initial evaluation completed.    Manual Therapy:  (25 minutes) Soft Tissue Mobilization (STM)/Myofacial Release (MFR) to  Bilateral gluteus medius, gluteus minimus, piriformis, quadratus femoris, IT band, and hamstring.  Also performed bilateral long leg distraction    Therapeutic Exercise:  (32 minutes)   Stationary Bike: 5 minutes, level 1   Hamstring Stretch with  Strap: 2 minutes bilateral (4 minutes total)   Lower Trunk Rotation (LTR): 3 minutes   Piriformis Stretch: 2 minutes bilateral (4 minutes total)   Abdominal Bracing: 3 minutes total   Gluteal Sets: 3 minutes total   Supine Hip Adduction with Ball: 3 minutes   IT Band Stretch with Strap: 2 minutes bilateral (4 minutes total)   Supine Hip Abduction with Theraband: 3 minutes, yellow theraband    Patient Education: Patient educated on performing pain free activities.  Patient also educated on importance of hamstring and IT band stretching as well as core activation with all lactivities.     Electrical Stimulation: Deferred Today: Lumbar Moist Heat Pack x 15 minutes, Interferential to lumbar spine x 15 minutes.      PLAN:  Patient will benefit from physical therapy (2) x/week for (8) weeks including manual therapy, therapeutic exercise, functional activities, modalities, and patient education.    Thank you for this referral.    These services are reasonable and necessary for the conditions set forth above while under my care.

## 2019-04-15 ENCOUNTER — CLINICAL SUPPORT (OUTPATIENT)
Dept: REHABILITATION | Facility: HOSPITAL | Age: 54
End: 2019-04-15
Payer: MEDICAID

## 2019-04-15 DIAGNOSIS — R26.2 DIFFICULTY WALKING: ICD-10-CM

## 2019-04-15 DIAGNOSIS — M54.41 CHRONIC RIGHT-SIDED LOW BACK PAIN WITH RIGHT-SIDED SCIATICA: Primary | ICD-10-CM

## 2019-04-15 DIAGNOSIS — G89.29 CHRONIC RIGHT-SIDED LOW BACK PAIN WITH RIGHT-SIDED SCIATICA: Primary | ICD-10-CM

## 2019-04-15 DIAGNOSIS — M62.81 MUSCLE WEAKNESS (GENERALIZED): ICD-10-CM

## 2019-04-15 DIAGNOSIS — M79.604 RIGHT LEG PAIN: ICD-10-CM

## 2019-04-15 PROCEDURE — 97014 ELECTRIC STIMULATION THERAPY: CPT

## 2019-04-15 PROCEDURE — 97140 MANUAL THERAPY 1/> REGIONS: CPT

## 2019-04-15 PROCEDURE — 97110 THERAPEUTIC EXERCISES: CPT

## 2019-04-15 NOTE — PROGRESS NOTES
Physical Therapy Progress Note     Name: Marcie Bazan  Clinic Number: [id number]  Diagnosis:   Encounter Diagnoses   Name Primary?    Difficulty walking     Muscle weakness (generalized)     Chronic right-sided low back pain with right-sided sciatica Yes    Right leg pain      Physician: Antonio Horan, *  Treatment Orders: PT Eval and Treat  Past Medical History:   Diagnosis Date    Alcohol abuse     Alcoholic hepatitis     Anemia of chronic disease     Coagulopathy     Encounter for blood transfusion     End stage liver disease     Hypertension     Hyponatremia     Liver cirrhosis     Liver transplant candidate 12/26/2017    Obesity (BMI 30-39.9) 1/5/2016       Precautions: Standard, Immunocompromised  Visit #: 4  Date of Eval: 3/26/2019  Plan of Care Expiration: 5/26/2019      Subjective   Pt reports: she is having a good day until she got on the stationary bike.  She reports 8/10 pain while riding the bike.  She continues to have difficulty getting in and out of the tub.  Pain Scale:  8/10 right leg, burning    Objective     Patient received individual therapy with activities as follows:     Supine Hamstring Stretch with Strap: 2 minutes each (4 minutes total)  Lower Trunk Rotation: 3 minutes  Piriformis Stretch with Towel: 2 minutes each (4 minutes total)  Abdominal Bracing: 3 minutes  Gluteal Sets: 3 minutes  Supine Hip Abduction: Yellow Theraband 3 minutes  Supine Hip Adduction with Ball: 3 minutes  Stationary Bike: 5 minutes, Level 1 to improve bilateral LE endurance and coordination.  IT band Stretch with Strap: 2 minutes each (4 minutes total) 32 minutes total    Manual Therapy: Soft Tissue Mobilization (STM)/Myofacial Release (MFR) to bilateral lumbar paraspinals, quadratus lumborum, gluteus medius, gluteus minimus, piriformis, quadriceps, IT band, hamstring, and gastroc/soleus. (25 minutes total)    Lumbar Moist Heat Pack x 15  minutes.  Interferential to lumbar spine x 15 minutes.    Written Home Exercises: Patient educated on performing abdominal bracing and gluteal sets at home with activity.  Pt demo good understanding of the education provided. Marcie demonstrated good return demonstration of activities.     Education provided re: POC, HEP  No spiritual or educational barriers to learning provided    Pt has no cultural, educational or language barriers to learning provided.    Assessment   Patient with increased pain after riding stationary bike.  Patient with increased tissue tension noted in bilateral lumbar paraspinals, gluts, piriformis, quads, IT band and hamstrings.    Short Term Goals:  (4 weeks)  1.  Patient will demonstrate 40% improved lumbar AROM.  2.  Patient will demonstrate 40% improved hip AROM.  3.  Patient will demonstrate 3/5 bilateral LE strength.  4.  Patient will have 5/10 pain overall.     Long Term Goals:  (8 weeks)  1.  Patient will demonstrate 80% improved lumbar AROM.  2.  Patient will demonstrate 80% improved hip AROM.  3.  Patient will demonstrate 4/5 bilateral LE strength.  4.  Patient will have 2/10 pain overall.    Patient demonstrates good rehab potential.     Plan   Continue PT 2x weekly under established Plan of Care, with treatment to include: pt education, HEP, therapeutic exercises, neuromuscular re-education/balance exercises, therapeutic activities, joint mobilizations, and modalities PRN, to work towards established goals. Pt may be seen by PTA to carry out plan of care.     Laura Patrick, PT   04/15/2019

## 2019-04-17 ENCOUNTER — CLINICAL SUPPORT (OUTPATIENT)
Dept: REHABILITATION | Facility: HOSPITAL | Age: 54
End: 2019-04-17
Payer: MEDICAID

## 2019-04-17 DIAGNOSIS — M62.81 MUSCLE WEAKNESS (GENERALIZED): ICD-10-CM

## 2019-04-17 DIAGNOSIS — G89.29 CHRONIC BILATERAL LOW BACK PAIN WITH RIGHT-SIDED SCIATICA: ICD-10-CM

## 2019-04-17 DIAGNOSIS — R26.2 DIFFICULTY WALKING: ICD-10-CM

## 2019-04-17 DIAGNOSIS — M79.604 RIGHT LEG PAIN: Primary | ICD-10-CM

## 2019-04-17 DIAGNOSIS — M54.41 CHRONIC BILATERAL LOW BACK PAIN WITH RIGHT-SIDED SCIATICA: ICD-10-CM

## 2019-04-17 PROBLEM — M54.40 CHRONIC BILATERAL LOW BACK PAIN WITH SCIATICA: Status: ACTIVE | Noted: 2019-04-17

## 2019-04-17 PROBLEM — M54.42 CHRONIC BILATERAL LOW BACK PAIN WITH SCIATICA: Status: ACTIVE | Noted: 2019-04-17

## 2019-04-17 PROCEDURE — 97110 THERAPEUTIC EXERCISES: CPT

## 2019-04-17 PROCEDURE — 97140 MANUAL THERAPY 1/> REGIONS: CPT

## 2019-04-17 NOTE — PROGRESS NOTES
Physical Therapy Daily Treatment Note     Name: Marcie Bazan  Clinic Number: [id number]    Therapy Diagnosis:   Encounter Diagnoses   Name Primary?    Difficulty walking     Muscle weakness (generalized)     Chronic bilateral low back pain with right-sided sciatica     Right leg pain Yes     Physician: Antonio Horan, *    Visit Date: 4/17/2019    Physician Orders: PT EVAL AND TREAT  Medical Diagnosis: Low Back Pain, Bilateral Hip Pain, SI pain  Evaluation Date: 3/26/2019  Authorization Period Expiration: 2/25/2020  Plan of Care Certification Period: 5/26/2019  Visit #/Visits authorized: 5     Time In: 754  Time Out: 904  Total Billable Time: 61 minutes    Precautions: Fall and organ transplant    Subjective     Pt reports: she is feeling good today.  She is nervous about walking on the treadmill.  Her legs continue to get weak.  She is inconsistent with home exercise program.  Response to previous treatment: Patient with less pain for a few hours.  Functional change: Patient attempting to use core activation with ADLs at home.    Pain: 3/10  Location: right leg and bilateral low back      Objective     Marcie received therapeutic exercises to develop strength, endurance, flexibility, posture and core stabilization for 36 minutes including:  Hamstring Stretch with Strap: 2 minutes each (4 minutes total)  Lower Trunk Rotation (LTR): 3 minutes  Piriformis Stretch with Towel: 2 minutes each (4 minutes total)  Abdominal Bracing: 3 minutes  Gluteal Sets: 3 minutes   Supine Hip Abduction: 3 minutes Yellow theraband  Supine Hip Adduction with Ball: 3 minutes  IT Band Stretch with Strap: 2 minutes each (4 minutes total)  Treadmill Walking: 3 minutes, for LE endurance, overall conditioning  Seated Marches: 3 minutes  Bridges: 3 minutes    Marcie received the following manual therapy techniques: Myofacial release, Soft tissue Mobilization and Lumbar Distraction and Right Long Leg Distraction were applied to the:  low back, gluts, right lower leg for 25 minutes, including:  STM/MFR to bilateral lumbar paraspinals, quadratus lumborum, gluteus medius, gluteus minimus, piriformis, and quadratus femoris. Lumbar distraction with sheet for low back distraction.  Also performed right long leg distraction.      Home Exercises Provided and Patient Education Provided     Education provided:   - on core activation with all activities as well as erect posture.    Written Home Exercises Provided: Patient instructed to cont prior HEP.  Exercises were reviewed and Marcie was able to demonstrate them prior to the end of the session.  Marcie demonstrated good  understanding of the education provided.     See EMR under Media for exercises provided prior visit.    Assessment     Patient tolerated new exercises well.  Patient required very close supervision on treadmill secondary to decreased endurance and poor strength in bilateral LEs.  Patient with knee buckling after 3 minutes of walking on treadmill at 0.9 mph. Patient with decreased symptoms after manual therapy.    Marcie is progressing well towards her goals.   Pt prognosis is Good.     Pt will continue to benefit from skilled outpatient physical therapy to address the deficits listed in the problem list box on initial evaluation, provide pt/family education and to maximize pt's level of independence in the home and community environment.     Pt's spiritual, cultural and educational needs considered and pt agreeable to plan of care and goals.     Anticipated barriers to physical therapy: organ transplant, decreased conditioning, family situation    Goals: Short Term Goals:  (4 weeks)  1.  Patient will demonstrate 40% improved lumbar AROM.  2.  Patient will demonstrate 40% improved hip AROM.  3.  Patient will demonstrate 3/5 bilateral LE strength.  4.  Patient will have 5/10 pain overall.     Long Term Goals:  (8 weeks)  1.  Patient will demonstrate 80% improved lumbar AROM.  2.  Patient  will demonstrate 80% improved hip AROM.  3.  Patient will demonstrate 4/5 bilateral LE strength.  4.  Patient will have 2/10 pain overall.      Plan     Continue with current POC 2x/week.  Progress exercises as patient tolerates.    Laura Patrick, PT,DPT

## 2019-04-18 DIAGNOSIS — E83.42 HYPOMAGNESEMIA: ICD-10-CM

## 2019-04-18 DIAGNOSIS — E55.9 VITAMIN D DEFICIENCY: ICD-10-CM

## 2019-04-18 RX ORDER — LANOLIN ALCOHOL/MO/W.PET/CERES
800 CREAM (GRAM) TOPICAL 2 TIMES DAILY
Qty: 270 TABLET | Refills: 0 | Status: SHIPPED | OUTPATIENT
Start: 2019-04-18 | End: 2019-06-24 | Stop reason: SDUPTHER

## 2019-04-18 RX ORDER — ERGOCALCIFEROL 1.25 MG/1
CAPSULE ORAL
Qty: 10 CAPSULE | Refills: 0 | Status: SHIPPED | OUTPATIENT
Start: 2019-04-18 | End: 2019-06-28 | Stop reason: SDUPTHER

## 2019-04-22 ENCOUNTER — LAB VISIT (OUTPATIENT)
Dept: LAB | Facility: HOSPITAL | Age: 54
End: 2019-04-22
Attending: INTERNAL MEDICINE
Payer: MEDICAID

## 2019-04-22 DIAGNOSIS — Z94.4 LIVER REPLACED BY TRANSPLANT: ICD-10-CM

## 2019-04-22 LAB
ALBUMIN SERPL BCP-MCNC: 3.4 G/DL (ref 3.5–5.2)
ALP SERPL-CCNC: 188 U/L (ref 55–135)
ALT SERPL W/O P-5'-P-CCNC: 28 U/L (ref 10–44)
ANION GAP SERPL CALC-SCNC: 10 MMOL/L (ref 8–16)
AST SERPL-CCNC: 19 U/L (ref 10–40)
BASOPHILS # BLD AUTO: 0.02 K/UL (ref 0–0.2)
BASOPHILS NFR BLD: 0.3 % (ref 0–1.9)
BILIRUB SERPL-MCNC: 0.3 MG/DL (ref 0.1–1)
BUN SERPL-MCNC: 15 MG/DL (ref 6–20)
CALCIUM SERPL-MCNC: 9.7 MG/DL (ref 8.7–10.5)
CHLORIDE SERPL-SCNC: 108 MMOL/L (ref 95–110)
CO2 SERPL-SCNC: 23 MMOL/L (ref 23–29)
CREAT SERPL-MCNC: 0.9 MG/DL (ref 0.5–1.4)
DIFFERENTIAL METHOD: ABNORMAL
EOSINOPHIL # BLD AUTO: 0.1 K/UL (ref 0–0.5)
EOSINOPHIL NFR BLD: 1.9 % (ref 0–8)
ERYTHROCYTE [DISTWIDTH] IN BLOOD BY AUTOMATED COUNT: 15 % (ref 11.5–14.5)
EST. GFR  (AFRICAN AMERICAN): >60 ML/MIN/1.73 M^2
EST. GFR  (NON AFRICAN AMERICAN): >60 ML/MIN/1.73 M^2
GLUCOSE SERPL-MCNC: 88 MG/DL (ref 70–110)
HCT VFR BLD AUTO: 37.2 % (ref 37–48.5)
HGB BLD-MCNC: 11.4 G/DL (ref 12–16)
IMM GRANULOCYTES # BLD AUTO: 0.02 K/UL (ref 0–0.04)
IMM GRANULOCYTES NFR BLD AUTO: 0.3 % (ref 0–0.5)
LYMPHOCYTES # BLD AUTO: 1.6 K/UL (ref 1–4.8)
LYMPHOCYTES NFR BLD: 25.7 % (ref 18–48)
MAGNESIUM SERPL-MCNC: 1.6 MG/DL (ref 1.6–2.6)
MCH RBC QN AUTO: 23.9 PG (ref 27–31)
MCHC RBC AUTO-ENTMCNC: 30.6 G/DL (ref 32–36)
MCV RBC AUTO: 78 FL (ref 82–98)
MONOCYTES # BLD AUTO: 0.4 K/UL (ref 0.3–1)
MONOCYTES NFR BLD: 6.3 % (ref 4–15)
NEUTROPHILS # BLD AUTO: 4.1 K/UL (ref 1.8–7.7)
NEUTROPHILS NFR BLD: 65.5 % (ref 38–73)
NRBC BLD-RTO: 0 /100 WBC
PLATELET # BLD AUTO: 85 K/UL (ref 150–350)
PMV BLD AUTO: 11.8 FL (ref 9.2–12.9)
POTASSIUM SERPL-SCNC: 4.5 MMOL/L (ref 3.5–5.1)
PROT SERPL-MCNC: 6.6 G/DL (ref 6–8.4)
RBC # BLD AUTO: 4.76 M/UL (ref 4–5.4)
SODIUM SERPL-SCNC: 141 MMOL/L (ref 136–145)
WBC # BLD AUTO: 6.19 K/UL (ref 3.9–12.7)

## 2019-04-22 PROCEDURE — 36415 COLL VENOUS BLD VENIPUNCTURE: CPT

## 2019-04-22 PROCEDURE — 83735 ASSAY OF MAGNESIUM: CPT

## 2019-04-22 PROCEDURE — 85025 COMPLETE CBC W/AUTO DIFF WBC: CPT

## 2019-04-22 PROCEDURE — 80197 ASSAY OF TACROLIMUS: CPT

## 2019-04-22 PROCEDURE — 80053 COMPREHEN METABOLIC PANEL: CPT

## 2019-04-23 ENCOUNTER — OFFICE VISIT (OUTPATIENT)
Dept: ORTHOPEDICS | Facility: CLINIC | Age: 54
End: 2019-04-23
Payer: MEDICAID

## 2019-04-23 VITALS
HEIGHT: 63 IN | WEIGHT: 197 LBS | BODY MASS INDEX: 34.91 KG/M2 | HEART RATE: 75 BPM | DIASTOLIC BLOOD PRESSURE: 80 MMHG | SYSTOLIC BLOOD PRESSURE: 122 MMHG

## 2019-04-23 DIAGNOSIS — M70.62 GREATER TROCHANTERIC BURSITIS OF LEFT HIP: ICD-10-CM

## 2019-04-23 DIAGNOSIS — E78.2 MIXED HYPERLIPIDEMIA: ICD-10-CM

## 2019-04-23 DIAGNOSIS — Z94.4 LIVER REPLACED BY TRANSPLANT: ICD-10-CM

## 2019-04-23 DIAGNOSIS — M70.61 GREATER TROCHANTERIC BURSITIS OF BOTH HIPS: Primary | ICD-10-CM

## 2019-04-23 DIAGNOSIS — Z79.60 LONG-TERM USE OF IMMUNOSUPPRESSANT MEDICATION: ICD-10-CM

## 2019-04-23 DIAGNOSIS — M47.26 OSTEOARTHRITIS OF SPINE WITH RADICULOPATHY, LUMBAR REGION: ICD-10-CM

## 2019-04-23 DIAGNOSIS — I10 ESSENTIAL HYPERTENSION: Chronic | ICD-10-CM

## 2019-04-23 DIAGNOSIS — M70.62 GREATER TROCHANTERIC BURSITIS OF BOTH HIPS: Primary | ICD-10-CM

## 2019-04-23 DIAGNOSIS — M70.61 GREATER TROCHANTERIC BURSITIS OF RIGHT HIP: ICD-10-CM

## 2019-04-23 LAB — TACROLIMUS BLD-MCNC: 7.8 NG/ML (ref 5–15)

## 2019-04-23 PROCEDURE — 99999 PR PBB SHADOW E&M-EST. PATIENT-LVL III: CPT | Mod: PBBFAC,,, | Performed by: FAMILY MEDICINE

## 2019-04-23 PROCEDURE — 99999 PR PBB SHADOW E&M-EST. PATIENT-LVL III: ICD-10-PCS | Mod: PBBFAC,,, | Performed by: FAMILY MEDICINE

## 2019-04-23 PROCEDURE — 20610 LARGE JOINT ASPIRATION/INJECTION: R GREATER TROCHANTERIC BURSA: ICD-10-PCS | Mod: 50,S$PBB,, | Performed by: FAMILY MEDICINE

## 2019-04-23 PROCEDURE — 99214 PR OFFICE/OUTPT VISIT, EST, LEVL IV, 30-39 MIN: ICD-10-PCS | Mod: 25,S$PBB,, | Performed by: FAMILY MEDICINE

## 2019-04-23 PROCEDURE — 20610 DRAIN/INJ JOINT/BURSA W/O US: CPT | Mod: PBBFAC | Performed by: FAMILY MEDICINE

## 2019-04-23 PROCEDURE — 99213 OFFICE O/P EST LOW 20 MIN: CPT | Mod: PBBFAC | Performed by: FAMILY MEDICINE

## 2019-04-23 PROCEDURE — 99214 OFFICE O/P EST MOD 30 MIN: CPT | Mod: 25,S$PBB,, | Performed by: FAMILY MEDICINE

## 2019-04-23 RX ORDER — TRIAMCINOLONE ACETONIDE 40 MG/ML
40 INJECTION, SUSPENSION INTRA-ARTICULAR; INTRAMUSCULAR
Status: DISCONTINUED | OUTPATIENT
Start: 2019-04-23 | End: 2019-04-23 | Stop reason: HOSPADM

## 2019-04-23 RX ADMIN — TRIAMCINOLONE ACETONIDE 40 MG: 40 INJECTION, SUSPENSION INTRA-ARTICULAR; INTRAMUSCULAR at 08:04

## 2019-04-23 NOTE — PROGRESS NOTES
Subjective:     Patient ID: Marcie Bazan is a 53 y.o. female.    Chief Complaint: Follow-up of the Left Hip    Patient is a 53-year-old female presents clinic today complaining of bilateral hip pain (right worse than left).  Patient states she has had this pain for several months.  States that she is currently doing physical therapy.  States she has also had back injections.  States that her pain has improved, but is still having significant discomfort.  States that her left side is weaker than her right, but her right side hurts worse.  Denies any recent falls, injuries, redness, or swelling. Patient states that she has never had any cortisone injections in the trochanteric bursae yet.  Of note, patient is a liver transplant recipient and is currently on Prograf.      Past Medical History:   Diagnosis Date    Alcohol abuse     Alcoholic hepatitis     Anemia of chronic disease     Coagulopathy     Encounter for blood transfusion     End stage liver disease     Hypertension     Hyponatremia     Liver cirrhosis     Liver transplant candidate 12/26/2017    Obesity (BMI 30-39.9) 1/5/2016     Past Surgical History:   Procedure Laterality Date    BREAST BIOPSY Left     benign    CHOLECYSTECTOMY      COLONOSCOPY N/A 12/5/2017    Performed by [facility] at [facility] (2ND FLR)    COLONOSCOPY N/A 12/1/2017    Performed by Filemon Fuentes MD at [facility] END[facility] (2ND FLR)    ESOPHAGOGASTRODUODENOSCOPY (EGD) N/A 12/1/2017    Performed by Filemon Fuentes [facility] at [facility] END[facility] (2ND FLR)    HYSTERECTOMY      complete     LIVER TRANSPLANT  12/2017    TRANSPLANT-LIVER N/A 12/26/2017    Performed by Romeo Moore MD at [facility] OR 2ND FLR     Family History   Problem Relation Age of Onset    Hypertension Mother     Alzheimer's disease Mother     Hypertension Father     Diabetes Father     Diabetes Sister     Depression Maternal Grandfather     Breast cancer Paternal Aunt      Social History     Socioeconomic  History    Marital status: Single     Spouse name: Not on file    Number of children: 2    Years of education: Not on file    Highest education level: Not on file   Occupational History    Not on file   Social Needs    Financial resource strain: Not on file    Food insecurity:     Worry: Not on file     Inability: Not on file    Transportation needs:     Medical: Not on file     Non-medical: Not on file   Tobacco Use    Smoking status: Never Smoker    Smokeless tobacco: Never Used   Substance and Sexual Activity    Alcohol use: No     Frequency: Never     Comment: Currently admitted to inpatient rehab 7/2017    Drug use: No    Sexual activity: Not Currently   Lifestyle    Physical activity:     Days per week: Not on file     Minutes per session: Not on file    Stress: Not on file   Relationships    Social connections:     Talks on phone: Not on file     Gets together: Not on file     Attends Jew service: Not on file     Active member of club or organization: Not on file     Attends meetings of clubs or organizations: Not on file     Relationship status: Not on file   Other Topics Concern    Not on file   Social History Narrative    Not on file     Medication List with Changes/Refills   Current Medications    ATORVASTATIN (LIPITOR) 40 MG TABLET    Take 1 tablet (40 mg total) by mouth once daily.    AZELASTINE (ASTELIN) 137 MCG (0.1 %) NASAL SPRAY    2 sprays (274 mcg total) by Nasal route 2 (two) times daily as needed for Rhinitis.    BIOTIN ORAL    Take by mouth.    DICLOFENAC SODIUM, BULK, 100 % POWD    Apply 1 application topically daily as needed.    ERGOCALCIFEROL (ERGOCALCIFEROL) 50,000 UNIT CAP    TAKE 1 CAPSULE BY MOUTH EVERY 7 DAYS    FAMOTIDINE (PEPCID) 20 MG TABLET    Take 1 tablet (20 mg total) by mouth 2 (two) times daily.    FUROSEMIDE (LASIX) 40 MG TABLET    TAKE 1 TABLET(40 MG) BY MOUTH EVERY DAY    GABAPENTIN (NEURONTIN) 600 MG TABLET    TAKE 1 AND 1/2 TABLETS BY MOUTH THREE  "TIMES DAILY. MAY CAUSE DROWSINESS    LEVOCETIRIZINE (XYZAL) 5 MG TABLET    Take 1 tablet (5 mg total) by mouth every evening.    MAGNESIUM OXIDE (MAG-OX) 400 MG (241.3 MG MAGNESIUM) TABLET    Take 2 tablets (800 mg total) by mouth 2 (two) times daily.    MAGNESIUM OXIDE (MAG-OX) 400 MG (241.3 MG MAGNESIUM) TABLET    Take 2 tablets (800 mg total) by mouth 2 (two) times daily.    METHOCARBAMOL (ROBAXIN) 750 MG TAB    Take 1 tablet (750 mg total) by mouth 3 (three) times daily as needed (muscle spasms).    NIFEDIPINE (PROCARDIA-XL) 60 MG (OSM) 24 HR TABLET    Take 1 tablet (60 mg total) by mouth once daily.    TACROLIMUS (PROGRAF) 1 MG CAP    Take 2 capsules (2 mg total) by mouth every 12 (twelve) hours.    TRAZODONE (DESYREL) 50 MG TABLET    Take 0.5 tablets (25 mg total) by mouth every evening.     Review of patient's allergies indicates:   Allergen Reactions    Ferrous sulfate Other (See Comments)     Patient states the pill makes her sick. She stated she would rather have a shot     Review of Systems   Constitutional: Negative for chills and fever.   HENT: Negative for hearing loss.    Cardiovascular: Negative for leg swelling.   Gastrointestinal: Negative for nausea and vomiting.   Musculoskeletal: Positive for joint pain. Negative for back pain, falls and myalgias.   Skin: Negative for rash.   Neurological: Negative for tingling, sensory change, focal weakness and weakness.        Objective:   Body mass index is 34.9 kg/m².  Vitals:    04/23/19 0759   BP: 122/80   Pulse: 75   Weight: 89.4 kg (197 lb)   Height: 5' 3" (1.6 m)   PainSc:   2   PainLoc: Leg           General    Nursing note and vitals reviewed.  Constitutional: She is oriented to person, place, and time. She appears well-developed and well-nourished. No distress.   Eyes: Conjunctivae are normal. No scleral icterus.   Pulmonary/Chest: Effort normal.   Neurological: She is alert and oriented to person, place, and time.   Psychiatric: She has a normal " mood and affect. Her behavior is normal. Judgment and thought content normal.     General Musculoskeletal Exam   Gait: antalgic         Right Hip Exam     Inspection   Swelling: absent  No deformity of hip.  Erythema: absent    Tenderness   The patient tender to palpation of the trochanteric bursa.    Range of Motion   The patient has normal right hip ROM.    Tests   Pain w/ forced internal rotation (CIERA): present  Pain w/ forced external rotation (FADIR): absent  Winnie: positive  Log Roll: negative    Other   Sensation: normal  Left Hip Exam     Inspection   Swelling: absent  No deformity of hip.  Erythema: absent    Tenderness   The patient tender to palpation of the trochanteric bursa.    Range of Motion   The patient has normal left hip ROM.    Tests   Pain w/ forced internal rotation (CIERA): present  Pain w/ forced external rotation (FADIR): absent  Winnie: positive  Log Roll: negative    Other   Sensation: normal          EXAMINATION:  XR HIP 2 VIEW LEFT    CLINICAL HISTORY:  Adhesive capsulitis of right shoulder    TECHNIQUE:  AP view of the pelvis and frog leg lateral view of the left hip were performed.    COMPARISON:  None    FINDINGS:  There is relative preservation of the hip joint spaces.  No fracture or dislocation is seen.  No collapse of the femoral heads.  Sacroiliac joints remain intact.  Pubic symphysis demonstrates a normal appearance      Impression       As above      Electronically signed by: Blaine Das MD  Date: 01/10/2019  Time: 10:07           Marcie was seen today for follow-up.    Diagnoses and all orders for this visit:    Greater trochanteric bursitis of both hips  -     Large Joint Aspiration/Injection: R greater trochanteric bursa  -     Large Joint Aspiration/Injection: L greater trochanteric bursa    Greater trochanteric bursitis of right hip  -     Large Joint Aspiration/Injection: R greater trochanteric bursa    Greater trochanteric bursitis of left hip  -     Large Joint  Aspiration/Injection: L greater trochanteric bursa    Osteoarthritis of spine with radiculopathy, lumbar region    Essential hypertension    Mixed hyperlipidemia    Liver replaced by transplant    Long-term use of immunosuppressant medication    -discussed the clinical course and nature of trochanteric bursitis and IT band syndrome with patient.  At this time patient would like to conservative approach with cortisone injections, over-the-counter topical creams, and physical therapy.    -bilateral hips Xray images were independently viewed and read by me showing mild-to-moderate degenerative changes noted bilateral hip joints.  No acute fractures or dislocations.  -Formal read by radiologist is as described above  -Discussed findings with patient    -Chronic hypertension.  Managed by patient's PCP.  -Chronic hyperlipidemia.  Managed by patient's PCP.  -Chronic immunosuppression secondary to liver transplant.  Managed by patient's gastroenterologist.    -Treatment options and alternatives were discussed with the patient. Patient expressed understanding. Patient was given the opportunity to ask questions and be an active participant in their medical care. Patient had no further questions or concerns at this time.   -Patient is an overall moderate risk for health complications from their medical conditions.

## 2019-04-23 NOTE — PROCEDURES
Large Joint Aspiration/Injection: L greater trochanteric bursa  Date/Time: 4/23/2019 8:19 AM  Performed by: Alden Bee MD  Authorized by: Alden Bee MD     Consent Done?:  Yes (Verbal)  Indications:  Pain and diagnostic evaluation  Procedure site marked: Yes    Timeout: Prior to procedure the correct patient, procedure, and site was verified      Location:  Hip  Site:  L greater trochanteric bursa  Prep: Patient was prepped and draped in usual sterile fashion    Needle size:  25 G  Approach:  Lateral  Medications:  40 mg triamcinolone acetonide 40 mg/mL  Patient tolerance:  Patient tolerated the procedure well with no immediate complications    Additional Comments: Patient experienced minimal blood loss (< 3 cc) and clean bandage was applied. Post procedure care was provided to the patient verbally and with their AVS. Patient was instructed to take it easy for the next 24-48 hours and was informed that their pain may increase once the anaesthesia wears off and before the steroid begins to work. Patient was instructed to ice area for 15 minutes if pain worsens. Patient was informed to contact clinic or go to the ED if they develop any fever, chills, redness, swelling, or other complications.

## 2019-04-23 NOTE — PROCEDURES
Large Joint Aspiration/Injection: R greater trochanteric bursa  Date/Time: 4/23/2019 8:18 AM  Performed by: Alden Bee MD  Authorized by: Alden Bee MD     Consent Done?:  Yes (Verbal)  Indications:  Pain and diagnostic evaluation  Procedure site marked: Yes    Timeout: Prior to procedure the correct patient, procedure, and site was verified      Location:  Hip  Site:  R greater trochanteric bursa  Prep: Patient was prepped and draped in usual sterile fashion    Ultrasonic Guidance for needle placement: No  Needle size:  25 G  Approach:  Lateral  Medications:  40 mg triamcinolone acetonide 40 mg/mL  Patient tolerance:  Patient tolerated the procedure well with no immediate complications    Additional Comments: Patient experienced minimal blood loss (< 3 cc) and clean bandage was applied. Post procedure care was provided to the patient verbally and with their AVS. Patient was instructed to take it easy for the next 24-48 hours and was informed that their pain may increase once the anaesthesia wears off and before the steroid begins to work. Patient was instructed to ice area for 15 minutes. Patient was informed to contact clinic or go to the ED if they develop any fever, chills, redness, swelling, or other complications.

## 2019-04-25 ENCOUNTER — HOSPITAL ENCOUNTER (EMERGENCY)
Facility: HOSPITAL | Age: 54
Discharge: HOME | End: 2019-04-25
Attending: EMERGENCY MEDICINE
Payer: MEDICAID

## 2019-04-25 VITALS
BODY MASS INDEX: 33.59 KG/M2 | WEIGHT: 201.63 LBS | RESPIRATION RATE: 17 BRPM | TEMPERATURE: 99 F | DIASTOLIC BLOOD PRESSURE: 74 MMHG | SYSTOLIC BLOOD PRESSURE: 127 MMHG | HEIGHT: 65 IN | OXYGEN SATURATION: 96 % | HEART RATE: 73 BPM

## 2019-04-25 DIAGNOSIS — R51.9 NONINTRACTABLE HEADACHE, UNSPECIFIED CHRONICITY PATTERN, UNSPECIFIED HEADACHE TYPE: ICD-10-CM

## 2019-04-25 DIAGNOSIS — M79.18 MUSCULOSKELETAL PAIN: ICD-10-CM
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
## 2019-04-25 PROCEDURE — 25000003 PHARM REV CODE 250: Performed by: EMERGENCY MEDICINE

## 2019-04-25 PROCEDURE — 99284 EMERGENCY DEPT VISIT MOD MDM: CPT

## 2019-04-25 RX ORDER — METHOCARBAMOL 500 MG/1
500 TABLET, FILM COATED ORAL
Status: COMPLETED | OUTPATIENT
Start: 2019-04-25 | End: 2019-04-25

## 2019-04-25 RX ORDER — CYCLOBENZAPRINE HCL 10 MG
5 TABLET ORAL 3 TIMES DAILY PRN
Qty: 15 TABLET | Refills: 0 | Status: SHIPPED | OUTPATIENT
Start: 2019-04-25 | End: 2019-05-22 | Stop reason: SDUPTHER

## 2019-04-25 RX ORDER — METHYLPREDNISOLONE 4 MG/1
TABLET ORAL
Qty: 1 PACKAGE | Refills: 0 | Status: SHIPPED | OUTPATIENT
Start: 2019-04-25 | End: 2019-12-06 | Stop reason: ALTCHOICE

## 2019-04-25 RX ORDER — GABAPENTIN 600 MG/1
TABLET ORAL
Qty: 90 TABLET | Refills: 0 | OUTPATIENT
Start: 2019-04-25

## 2019-04-25 RX ADMIN — METHOCARBAMOL TABLETS 500 MG: 500 TABLET, COATED ORAL at 06:04

## 2019-04-25 NOTE — DISCHARGE INSTRUCTIONS
Continue all home medications.  Add Flexeril as a muscle relaxer.  Medrol Dosepak as anti-inflammatory.  Follow up with doctor 1-2 days for re-evaluation.  Return as needed for any worsening symptoms, problems, questions or concerns.
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
## 2019-04-26 ENCOUNTER — TELEPHONE (OUTPATIENT)
Dept: PAIN MEDICINE | Facility: CLINIC | Age: 54
End: 2019-04-26

## 2019-04-26 ENCOUNTER — TELEPHONE (OUTPATIENT)
Dept: TRANSPLANT | Facility: CLINIC | Age: 54
End: 2019-04-26

## 2019-04-26 NOTE — TELEPHONE ENCOUNTER
----- Message from Joselin Souza MD sent at 4/26/2019 10:42 AM CDT -----  Reviewed, nothing to do; repeat per routine

## 2019-04-26 NOTE — TELEPHONE ENCOUNTER
Dr. Souza reviewed your labs.  Continue routine labs no changes needed.  Letter sent for next lab appointment 06/03/19
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
## 2019-04-29 ENCOUNTER — TELEPHONE (OUTPATIENT)
Dept: GASTROENTEROLOGY | Facility: CLINIC | Age: 54
End: 2019-04-29

## 2019-04-29 ENCOUNTER — OFFICE VISIT (OUTPATIENT)
Dept: PAIN MEDICINE | Facility: CLINIC | Age: 54
End: 2019-04-29
Payer: MEDICAID

## 2019-04-29 ENCOUNTER — CLINICAL SUPPORT (OUTPATIENT)
Dept: REHABILITATION | Facility: HOSPITAL | Age: 54
End: 2019-04-29
Payer: MEDICAID

## 2019-04-29 VITALS
BODY MASS INDEX: 33.49 KG/M2 | WEIGHT: 201 LBS | HEIGHT: 65 IN | RESPIRATION RATE: 18 BRPM | SYSTOLIC BLOOD PRESSURE: 157 MMHG | DIASTOLIC BLOOD PRESSURE: 95 MMHG | HEART RATE: 91 BPM

## 2019-04-29 DIAGNOSIS — M62.81 MUSCLE WEAKNESS (GENERALIZED): ICD-10-CM

## 2019-04-29 DIAGNOSIS — M47.812 SPONDYLOSIS OF CERVICAL REGION WITHOUT MYELOPATHY OR RADICULOPATHY: ICD-10-CM

## 2019-04-29 DIAGNOSIS — G89.29 CHRONIC BILATERAL LOW BACK PAIN WITH RIGHT-SIDED SCIATICA: ICD-10-CM

## 2019-04-29 DIAGNOSIS — M54.41 CHRONIC BILATERAL LOW BACK PAIN WITH RIGHT-SIDED SCIATICA: ICD-10-CM

## 2019-04-29 DIAGNOSIS — M54.16 LUMBAR RADICULOPATHY: ICD-10-CM

## 2019-04-29 DIAGNOSIS — R29.898 WEAKNESS OF BOTH LOWER EXTREMITIES: ICD-10-CM

## 2019-04-29 DIAGNOSIS — M51.36 DEGENERATIVE DISC DISEASE, LUMBAR: Primary | ICD-10-CM

## 2019-04-29 DIAGNOSIS — M25.552 LEFT HIP PAIN: ICD-10-CM

## 2019-04-29 DIAGNOSIS — R26.2 DIFFICULTY WALKING: ICD-10-CM

## 2019-04-29 PROCEDURE — 97140 MANUAL THERAPY 1/> REGIONS: CPT

## 2019-04-29 PROCEDURE — 99999 PR PBB SHADOW E&M-EST. PATIENT-LVL IV: ICD-10-PCS | Mod: PBBFAC,,, | Performed by: PHYSICIAN ASSISTANT

## 2019-04-29 PROCEDURE — 99999 PR PBB SHADOW E&M-EST. PATIENT-LVL IV: CPT | Mod: PBBFAC,,, | Performed by: PHYSICIAN ASSISTANT

## 2019-04-29 PROCEDURE — 99214 OFFICE O/P EST MOD 30 MIN: CPT | Mod: PBBFAC,PN | Performed by: PHYSICIAN ASSISTANT

## 2019-04-29 PROCEDURE — 99214 OFFICE O/P EST MOD 30 MIN: CPT | Mod: S$PBB,,, | Performed by: PHYSICIAN ASSISTANT

## 2019-04-29 PROCEDURE — 99214 PR OFFICE/OUTPT VISIT, EST, LEVL IV, 30-39 MIN: ICD-10-PCS | Mod: S$PBB,,, | Performed by: PHYSICIAN ASSISTANT

## 2019-04-29 PROCEDURE — 97110 THERAPEUTIC EXERCISES: CPT

## 2019-04-29 RX ORDER — GABAPENTIN 600 MG/1
TABLET ORAL
Qty: 90 TABLET | Refills: 3 | Status: SHIPPED | OUTPATIENT
Start: 2019-04-29 | End: 2020-01-24 | Stop reason: SDUPTHER
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
## 2019-04-30 ENCOUNTER — TELEPHONE (OUTPATIENT)
Dept: GASTROENTEROLOGY | Facility: CLINIC | Age: 54
End: 2019-04-30

## 2019-04-30 NOTE — TELEPHONE ENCOUNTER
----- Message from Carlee Wade sent at 4/30/2019 12:53 PM CDT -----  Contact: Pt   Pt is calling .Type:  Needs Medical Advice    Who Called: Pt   Symptoms (please be specific): Pt is calling back to see if nurse receive message concerning checking to see if pt could take medication from pain management   How long has patient had these symptoms:    Pharmacy name and phone #:     Would the patient rather a call back or a response via [facility]? Call back   Best Call Back Number: [phone]          ..Thank You  Katerine Wade

## 2019-04-30 NOTE — TELEPHONE ENCOUNTER
Spoke with patient. Informed her that her liver coordinator spoke with pain management about NSAIDS are not to be taken with Prograf. If not able to handle the pain please call pain management so a different medication can be prescribed, she verbalized understanding, REINALDO.

## 2019-05-02 ENCOUNTER — PATIENT MESSAGE (OUTPATIENT)
Dept: GASTROENTEROLOGY | Facility: CLINIC | Age: 54
End: 2019-05-02

## 2019-05-02 ENCOUNTER — TELEPHONE (OUTPATIENT)
Dept: GASTROENTEROLOGY | Facility: CLINIC | Age: 54
End: 2019-05-02

## 2019-05-02 NOTE — TELEPHONE ENCOUNTER
Call placed to patient to get more infor regarding message left via portal, no answer, left message to call office

## 2019-05-03 ENCOUNTER — TELEPHONE (OUTPATIENT)
Dept: GASTROENTEROLOGY | Facility: CLINIC | Age: 54
End: 2019-05-03

## 2019-05-03 ENCOUNTER — PATIENT MESSAGE (OUTPATIENT)
Dept: PAIN MEDICINE | Facility: CLINIC | Age: 54
End: 2019-05-03

## 2019-05-03 NOTE — TELEPHONE ENCOUNTER
Spoke with patient.  She states having rectal bleeding everyday and having to wear a sanitary napkin to keep from soiling her clothing. Will inform  and coordinator, she verbalized understanding, REINALDO.

## 2019-05-03 NOTE — TELEPHONE ENCOUNTER
Needs to make sure patient is not constipated as she has a history of large hemorrhoids.  Recommend that she take MiraLax 1 cap daily.  Recommend also that she take Sitz baths daily if possible she needs to use a towel to open up her butt cheek to help perhaps decompress some of the hemorrhoids.  She should try this for 1-2 weeks.  If symptoms do not improve they will need to send her to see a colorectal surgeon about her hemorrhoids.  They may be internal and can be banded.

## 2019-05-06 ENCOUNTER — CLINICAL SUPPORT (OUTPATIENT)
Dept: REHABILITATION | Facility: HOSPITAL | Age: 54
End: 2019-05-06
Payer: MEDICAID

## 2019-05-06 DIAGNOSIS — R26.2 DIFFICULTY WALKING: ICD-10-CM

## 2019-05-06 DIAGNOSIS — M54.41 CHRONIC BILATERAL LOW BACK PAIN WITH RIGHT-SIDED SCIATICA: ICD-10-CM

## 2019-05-06 DIAGNOSIS — M62.81 MUSCLE WEAKNESS (GENERALIZED): ICD-10-CM

## 2019-05-06 DIAGNOSIS — G89.29 CHRONIC BILATERAL LOW BACK PAIN WITH RIGHT-SIDED SCIATICA: ICD-10-CM

## 2019-05-06 PROCEDURE — 97110 THERAPEUTIC EXERCISES: CPT

## 2019-05-06 PROCEDURE — 97140 MANUAL THERAPY 1/> REGIONS: CPT

## 2019-05-06 NOTE — PROGRESS NOTES
Physical Therapy Daily Treatment Note     Name: Marcie Bazan  Clinic Number: [id number]    Therapy Diagnosis:   Encounter Diagnoses   Name Primary?    Chronic bilateral low back pain with right-sided sciatica     Difficulty walking     Muscle weakness (generalized)      Physician: Antonio Horan, *    Visit Date: 2019    Physician Orders: PT EVAL AND TREAT  Medical Diagnosis: Low Back Pain, Bilateral Hip Pain, SI pain  Evaluation Date: 3/26/2019  Authorization Period Expiration: 2020  Plan of Care Certification Period: 2019  Visit #/Visits authorized: 6     Time In: 8:00  Time Out: 9:20  Total Billable Time: 59 minutes    Precautions: Fall and organ transplant    Subjective     Pt reports: she started a new medicine last Monday and it has her very sleepy.  She states she was in a lot of pain and now the medicine has her feeling better.  She isn't able to do her normal activity because she is so sleepy.    eShe is inconsistent with home exercise program.  Response to previous treatment: Patient with less pain secondary to new medicine.  Patient went straight to pain management after last appointment.  Functional change: Patient with increased fatigue since last treatment secondary to medicine.  Patient unable to make functional change.    Pain: 310  Location: right leg and bilateral low back     Objective     Marcie received therapeutic exercises to develop strength, endurance, flexibility, posture and core stabilization for  34 minutes including:  Treadmill Walkin minutes at 0.9 mph to build up endurance, LE strength, work on erect posture and core stabilization  Hamstring Stretch with Strap: 2 minutes each (4 minutes total)  Abdominal Bracing: 3 minutes  Supine Hip Abduction: 3 minutes Yellow theraband  Supine Hip Adduction with Ball: 3 minutes  IT Band Stretch with Strap: 2 minutes each (4 minutes total)  Seated Marches: 3 minutes  Bridges: 3 minutes  Lower Trunk Rotations: 3  minutes  Piriformis Stretch: 2 minutes each (4 minutes total)    Marcie received the following manual therapy techniques: Myofacial release, Soft tissue Mobilization and Lumbar Distraction and Right Long Leg Distraction were applied to the: low back, gluts, right lower leg for 25 minutes, including:  Lumbar distraction with sheet for low back distraction.  Also performed right long leg distraction. STM to right lumbar paraspinals, quadratus lumborum, gluteus medius, gluteus minimus, piriformis, right quads, hip flexors, IT band, and gastroc/soleus.  Also performed bilateral PROM to bilateral hips.      Home Exercises Provided and Patient Education Provided     Education provided:   - on core activation with all activities as well as erect posture.    Written Home Exercises Provided: Patient educated to continue HEP.  Exercises were reviewed and Marcie was able to demonstrate them prior to the end of the session.  Marcie demonstrated good  understanding of the education provided.     See EMR under Media for exercises provided on 4/29.    Assessment     Patient required verbal and tactile cues for exercise technique with treadmill walking.  She required encouragement to keep up pace and erect posture.  Patient demonstrated improved stretch with hamstring and IT band bilaterally.    Marcie is progressing well towards her goals.   Pt prognosis is Good.     Pt will continue to benefit from skilled outpatient physical therapy to address the deficits listed in the problem list box on initial evaluation, provide pt/family education and to maximize pt's level of independence in the home and community environment.     Pt's spiritual, cultural and educational needs considered and pt agreeable to plan of care and goals.     Anticipated barriers to physical therapy: organ transplant, decreased conditioning, family situation    Goals: Short Term Goals:  (4 weeks)  1.  Patient will demonstrate 40% improved lumbar AROM.  2.  Patient  will demonstrate 40% improved hip AROM.  3.  Patient will demonstrate 3/5 bilateral LE strength.  4.  Patient will have 5/10 pain overall.     Long Term Goals:  (8 weeks)  1.  Patient will demonstrate 80% improved lumbar AROM.  2.  Patient will demonstrate 80% improved hip AROM.  3.  Patient will demonstrate 4/5 bilateral LE strength.  4.  Patient will have 2/10 pain overall.      Plan     Continue with current POC 2x/week.  Progress exercises as patient tolerates.    Laura Patrick, PT,DPT

## 2019-05-14 ENCOUNTER — CLINICAL SUPPORT (OUTPATIENT)
Dept: REHABILITATION | Facility: HOSPITAL | Age: 54
End: 2019-05-14
Payer: MEDICAID

## 2019-05-14 DIAGNOSIS — M54.41 CHRONIC BILATERAL LOW BACK PAIN WITH RIGHT-SIDED SCIATICA: ICD-10-CM

## 2019-05-14 DIAGNOSIS — M62.81 MUSCLE WEAKNESS (GENERALIZED): ICD-10-CM

## 2019-05-14 DIAGNOSIS — G89.29 CHRONIC BILATERAL LOW BACK PAIN WITH RIGHT-SIDED SCIATICA: ICD-10-CM

## 2019-05-14 DIAGNOSIS — R26.2 DIFFICULTY WALKING: ICD-10-CM

## 2019-05-14 PROCEDURE — 97140 MANUAL THERAPY 1/> REGIONS: CPT

## 2019-05-14 PROCEDURE — 97110 THERAPEUTIC EXERCISES: CPT

## 2019-05-14 NOTE — PROGRESS NOTES
Physical Therapy Progress Treatment Note     Name: Marcie Bazan  Clinic Number: [id number]    Therapy Diagnosis:   Encounter Diagnoses   Name Primary?    Chronic bilateral low back pain with right-sided sciatica     Difficulty walking     Muscle weakness (generalized)      Physician: Antonio Horan, *    Visit Date: 5/14/2019    Physician Orders: PT EVAL AND TREAT  Medical Diagnosis: bilateral great trochanteric bursitis, OA of lumbar spine, SI pain  Evaluation Date: 3/26/2019  Authorization Period Expiration: 6/8/2019  Plan of Care Certification Period: 5/26/2019  Visit #/Visits authorized: 9/ 20     Time In: 7:58  Time Out: 9:30  Total Billable Time: 61 minutes    Precautions: Standard, Immunosuppression and organ transplant    Subjective     Pt reports: Patient states she feels really good today.  She has 0/10 pain.  She is able to do more at home.  She is doing her exercises as well. She reports having a few days of no pain.  She was compliant with home exercise program.  Response to previous treatment: less pain, and more active  Functional change: Patient able to do more activities around her home.    Pain: 5/10  Location: right lower legs after walking on the treadmill    Objective     Lumbar AROM:   Flexion: Hand to mid shins.  Extension: 5 degrees  Side Bending Right: Hand to mid thigh  Side Bending Left: Hand to lateral joint line.  Rotation Right: 50 degrees  Rotation Left: 60 degrees    Hip AROM:  Flexion: Right limited secondary to pain, Left limited secondary to weakness  Extension: Right limited secondary to pain and tightness, Left limited secondary to weakness and tightness  Abduction: Right unable to perform AROM (PROM WFL), left limited secondary to weakness    LE MMT:  Hip Flexion: Right 4/5, Left 4-/5  Knee Extension: Right 5/5, Left 4+/5  Knee Flexion: Right 4+/5, Left 4/5  Ankle Dorsiflexion: Right 4+/5, Left 4+/5  Hip Abduction: Right 2/5, Left 3-/5    MODIFIED OSWESTRY LOW BACK  PAIN DISABILITY QUESTIONNAIRE - The following scores are patient-reported and range from 0-5, with 0 being least impaired and 5 being most impaired.    Section 1- Pain intensity    Score 4/5   Section 2- Person care  Score 3/5   Section 3 Lifting- Optional  Score 4/5     Section 4  Walking  Score 3/5  Section 5 Sitting   Score 2/5   Section 6 Standing  Score 4/5  Section 7 Sleeping  Score 4/5   Section 8 Social Life   Score 3/5  Section 9 Traveling  Score 3/5   Section 10 Employ/home  Score 3/5    Patient reports 64% disability on the Modified Oswestry Low Back Pain Disability Questionnaire.    Marcie received therapeutic exercises to develop strength, endurance, ROM, flexibility and posture for 36 minutes including:  Hamstring Stretch with Strap: 2 minutes each (4 minutes total)  Lower Trunk Rotation: 3 minutes  Piriformis Stretch: 2 minutes each (4 minutes total)  Abdominal Bracing: 3 minutes  Supine Hip Abduction: Yellow Theraband, 3 minutes  Supine Hip Adduction: 3 minutes  IT band Stretch:  2 minutes each (4 minutes total)  Treadmill Walkin.9 mph, 6 minutes  Seated Marches: 3 minutes  Bridges: 3 minutes    Marcie received the following manual therapy techniques: Myofacial release and Soft tissue Mobilization were applied to the: bilateral hip and legs for 25 minutes, including:  STM/MFR to bilateral gluteus medius, gluteus minimus, piriformis, IT band, quad, hip flexor, gastroc/soleus.  Right Long leg distraction.          Home Exercises Provided and Patient Education Provided     Education provided:   - Patient educated on importance of continuing exercises at home to build endurance.    Written Home Exercises Provided: Patient instructed to cont prior HEP.  Exercises were reviewed and Marcie was able to demonstrate them prior to the end of the session.  Marcie demonstrated good  understanding of the education provided.     See EMR under Patient Instructions for exercises provided prior visit.    Assessment      Patient with increased tissue tension noted in right IT Band as well as bilateral glutes and piriformis.  Patient tolerated all exercises well.   Patient demonstrated one episode of bilateral LEs giving way while testing lumbar AROM.  Patient required rest breaks during AROM as well as manual muscle testing.  Patient with decreased symptoms after manual therapy.    Marcie is progressing well towards her goals.   Pt prognosis is Excellent.     Pt will continue to benefit from skilled outpatient physical therapy to address the deficits listed in the problem list box on initial evaluation, provide pt/family education and to maximize pt's level of independence in the home and community environment.     Pt's spiritual, cultural and educational needs considered and pt agreeable to plan of care and goals.     Anticipated barriers to physical therapy: motivation, family situation    Goals: Short Term Goals:  (4 weeks)  1.  Patient will demonstrate 40% improved lumbar AROM.  2.  Patient will demonstrate 40% improved hip AROM.  3.  Patient will demonstrate 3/5 bilateral LE strength.  4.  Patient will have 5/10 pain overall.     Long Term Goals:  (8 weeks)  1.  Patient will demonstrate 80% improved lumbar AROM.  2.  Patient will demonstrate 80% improved hip AROM.  3.  Patient will demonstrate 4/5 bilateral LE strength.  4.  Patient will have 2/10 pain overall.      Plan     Continue with current plan of care. Progress exercises as patient tolerates.      Laura Patrick, PT , DPT

## 2019-05-16 NOTE — PLAN OF CARE
Discharge Diagnosis:Chronic left shoulder pain [M25.512, G89.29]  Condition on Discharge: Stable.  Diet on Discharge: Same as before.  Activity: as per instruction sheet.  Discharge to: Home with a responsible adult.  Follow up: as per Discharge instructions       Problem: Patient Care Overview  Goal: Plan of Care Review  Outcome: Ongoing (interventions implemented as appropriate)  Plan of care reviewed with patient. Pt verbalized understanding. Pt AAOX4. Pt free from falls, injuries and trauma.  Pt free from skin breakdown.  Pt VSS and in NAD.  Pt afebrile.  Pt had no complaints of SOB, N/V or CP. Pt c/o of eyes swelling and itching. Prn iv benadryl given.  Adequate UOP this shift. No BM this shift. 2 kg weight gain over night. Pt states that her thighs feel like they are burning. Pt in low locked bed with personal items and call light within reach. Fall precautions maintained.

## 2019-05-20 ENCOUNTER — CLINICAL SUPPORT (OUTPATIENT)
Dept: REHABILITATION | Facility: HOSPITAL | Age: 54
End: 2019-05-20
Payer: MEDICAID

## 2019-05-20 ENCOUNTER — PATIENT MESSAGE (OUTPATIENT)
Dept: PAIN MEDICINE | Facility: CLINIC | Age: 54
End: 2019-05-20

## 2019-05-20 DIAGNOSIS — M62.81 MUSCLE WEAKNESS (GENERALIZED): ICD-10-CM

## 2019-05-20 DIAGNOSIS — R26.2 DIFFICULTY WALKING: ICD-10-CM

## 2019-05-20 DIAGNOSIS — G89.29 CHRONIC BILATERAL LOW BACK PAIN WITH RIGHT-SIDED SCIATICA: ICD-10-CM

## 2019-05-20 DIAGNOSIS — M54.41 CHRONIC BILATERAL LOW BACK PAIN WITH RIGHT-SIDED SCIATICA: ICD-10-CM

## 2019-05-20 PROCEDURE — 97110 THERAPEUTIC EXERCISES: CPT

## 2019-05-20 PROCEDURE — 97140 MANUAL THERAPY 1/> REGIONS: CPT

## 2019-05-20 NOTE — PROGRESS NOTES
Physical Therapy Progress Treatment Note     Name: Marcie Bazan  Clinic Number: [id number]    Therapy Diagnosis:   Encounter Diagnoses   Name Primary?    Chronic bilateral low back pain with right-sided sciatica     Difficulty walking     Muscle weakness (generalized)      Physician: Antonio Horan, *    Visit Date: 2019    Physician Orders: PT EVAL AND TREAT  Medical Diagnosis: bilateral great trochanteric bursitis, OA of lumbar spine, SI pain  Evaluation Date: 3/26/2019  Authorization Period Expiration: 2019  Plan of Care Certification Period: 2019  Visit #/Visits authorized: 10/ 20     Time In: 8:00  Time Out: 9:30  Total Billable Time: 61 minutes    Precautions: Standard, Immunosuppression and organ transplant    Subjective     Pt reports: Patient states she feels really good today.  She had a busy week and weekend with graduations and graduation parties.  She reports having some back pain today.  She would report it as 5/10.  When she is lying down and not moving she has 0/10 pain.  She has also run out of her pain medicines.  She was compliant with home exercise program.  Response to previous treatment: less pain after a few days  Functional change: Patient able to attend graduations and parties during the week.    Pain: 5/10  Location: back    Objective     Marcie received therapeutic exercises to develop strength, endurance, ROM, flexibility and posture for 36 minutes including:  Hamstring Stretch with Strap: 2 minutes each (4 minutes total)  Lower Trunk Rotation: 3 minutes  Piriformis Stretch: 2 minutes each (4 minutes total)  Abdominal Bracing: 3 minutes  Gluteal Sets: 3 minutes  Supine Hip Abduction: Yellow Theraband, 3 minutes  Supine Hip Adduction: 3 minutes  IT band Stretch:  2 minutes each (4 minutes total)  Treadmill Walkin.9 mph, 7 minutes  Seated Marches: 3 minutes      Marcie received the following manual therapy techniques: Myofacial release and Soft tissue  Mobilization were applied to the: bilateral hip and legs for 25 minutes, including:  STM/MFR to bilateral lumbar parapinals, quadratus lumborum, gluteus medius, gluteus minimus, piriformis, IT band, quad, hip flexor, gastroc/soleus.  Right Long leg distraction. Manual lumbar distraction with sheet.      Home Exercises Provided and Patient Education Provided     Education provided:   - Patient educated on importance of performing only painfree activities at home.  Also encouraged patient to use core stabilization with all activities.     Written Home Exercises Provided: Patient instructed to cont prior HEP.  Exercises were reviewed and Marcie was able to demonstrate them prior to the end of the session.  Marcie demonstrated good  understanding of the education provided.     See EMR under Patient Instructions for exercises provided prior visit.    Assessment     Patient with decreased symptoms after manual therapy.  Patient had progressively more pain with each exercise today despite max verbal cues to keep exercises pain free.  Patient demonstrated poor gait after therapy and had to be walked out to car.    Marcie is progressing well towards her goals.   Pt prognosis is Excellent.     Pt will continue to benefit from skilled outpatient physical therapy to address the deficits listed in the problem list box on initial evaluation, provide pt/family education and to maximize pt's level of independence in the home and community environment.     Pt's spiritual, cultural and educational needs considered and pt agreeable to plan of care and goals.     Anticipated barriers to physical therapy: motivation, family situation    Goals: Short Term Goals:  (4 weeks)  1.  Patient will demonstrate 40% improved lumbar AROM.  2.  Patient will demonstrate 40% improved hip AROM.  3.  Patient will demonstrate 3/5 bilateral LE strength.  4.  Patient will have 5/10 pain overall.     Long Term Goals:  (8 weeks)  1.  Patient will demonstrate  80% improved lumbar AROM.  2.  Patient will demonstrate 80% improved hip AROM.  3.  Patient will demonstrate 4/5 bilateral LE strength.  4.  Patient will have 2/10 pain overall.      Plan     Continue with current plan of care. Progress exercises as patient tolerates.      Laura Patrick, PT , DPT

## 2019-05-21 ENCOUNTER — CLINICAL SUPPORT (OUTPATIENT)
Dept: REHABILITATION | Facility: HOSPITAL | Age: 54
End: 2019-05-21
Payer: MEDICAID

## 2019-05-21 DIAGNOSIS — M54.41 CHRONIC BILATERAL LOW BACK PAIN WITH RIGHT-SIDED SCIATICA: ICD-10-CM

## 2019-05-21 DIAGNOSIS — R26.2 DIFFICULTY WALKING: ICD-10-CM

## 2019-05-21 DIAGNOSIS — G89.29 CHRONIC BILATERAL LOW BACK PAIN WITH RIGHT-SIDED SCIATICA: ICD-10-CM

## 2019-05-21 DIAGNOSIS — M62.81 MUSCLE WEAKNESS (GENERALIZED): ICD-10-CM

## 2019-05-21 PROCEDURE — 97140 MANUAL THERAPY 1/> REGIONS: CPT

## 2019-05-21 NOTE — PROGRESS NOTES
"  Physical Therapy Progress Treatment Note     Name: Marcie Bazan  Clinic Number: [id number]    Therapy Diagnosis:   Encounter Diagnoses   Name Primary?    Chronic bilateral low back pain with right-sided sciatica     Difficulty walking     Muscle weakness (generalized)      Physician: Antonio Horan, *    Visit Date: 5/21/2019    Physician Orders: PT EVAL AND TREAT  Medical Diagnosis: bilateral great trochanteric bursitis, OA of lumbar spine, SI pain  Evaluation Date: 3/26/2019  Authorization Period Expiration: 6/8/2019  Plan of Care Certification Period: 5/26/2019  Visit #/Visits authorized: 10/ 20     Time In: 8:00  Time Out: 8:35  Total Billable Time: 25 minutes    Precautions: Standard, Immunosuppression and organ transplant    Subjective     Pt reports: Patient states hurt the rest of yesterday after her treatment.  She had to call and get an "emergency appointment" with pain management for tomorrow.  She states she woke up with no pain today but as the day progresses her pain increases.  She feels the "twitching" has lessened as well.    She was compliant with home exercise program.  Response to previous treatment: Increased pain and decreased function.  Functional change: Patient unable to perform activities around the house and had to rest.    Pain: 8/10  Location: back    Objective     Marcie received therapeutic exercises to develop strength, endurance, ROM, flexibility and posture for 0 minutes including:  Patient did not perform any exercises today secondary to increased pain level, decreased endurance, and full body twitches in response to movement and touch.      Marcie received the following manual therapy techniques: Myofacial release and Soft tissue Mobilization were applied to the: bilateral hip and legs for 25 minutes, including:  STM/MFR to bilateral lumbar parapinals, quadratus lumborum, gluteus medius, gluteus minimus, piriformis, IT band, quad, hip flexor, gastroc/soleus.  Right Long " leg distraction. Manual lumbar distraction with sheet.      Home Exercises Provided and Patient Education Provided     Education provided:   - Patient educated on how to log roll with core stabilization.  Patient encouraged to use core stabilization with all activities.     Written Home Exercises Provided: Patient instructed to cont prior HEP.  Exercises were reviewed and Marcie was able to demonstrate them prior to the end of the session.  Marcie demonstrated good  understanding of the education provided.     See EMR under Patient Instructions for exercises provided prior visit.    Assessment     Patient with decreased tolerance for therapy today.  Patient only able to tolerate manual therapy.  Patient demonstrated full body twitching with light touch during manual therapy.  Patient continues to have increased tissue tension in bilateral lumbar paraspinals, quadratus femoris, IT band and hamstring.  Patient also had tenderness and increased tissue tension in bilateral piriformis. Patient also demonstrates poor core activation with log rolling despite max verbal cues.    Marcie is progressing well towards her goals.   Pt prognosis is Excellent.     Pt will continue to benefit from skilled outpatient physical therapy to address the deficits listed in the problem list box on initial evaluation, provide pt/family education and to maximize pt's level of independence in the home and community environment.     Pt's spiritual, cultural and educational needs considered and pt agreeable to plan of care and goals.     Anticipated barriers to physical therapy: motivation, family situation    Goals: Short Term Goals:  (4 weeks)  1.  Patient will demonstrate 40% improved lumbar AROM.  2.  Patient will demonstrate 40% improved hip AROM.  3.  Patient will demonstrate 3/5 bilateral LE strength.  4.  Patient will have 5/10 pain overall.     Long Term Goals:  (8 weeks)  1.  Patient will demonstrate 80% improved lumbar AROM.  2.  Patient  will demonstrate 80% improved hip AROM.  3.  Patient will demonstrate 4/5 bilateral LE strength.  4.  Patient will have 2/10 pain overall.      Plan     Continue with current plan of care. Progress exercises as patient tolerates.      Laura Patrick, PT , DPT

## 2019-05-22 ENCOUNTER — OFFICE VISIT (OUTPATIENT)
Dept: PAIN MEDICINE | Facility: CLINIC | Age: 54
End: 2019-05-22
Payer: MEDICAID

## 2019-05-22 VITALS
WEIGHT: 201 LBS | SYSTOLIC BLOOD PRESSURE: 145 MMHG | BODY MASS INDEX: 33.49 KG/M2 | DIASTOLIC BLOOD PRESSURE: 89 MMHG | HEIGHT: 65 IN | RESPIRATION RATE: 18 BRPM | HEART RATE: 78 BPM

## 2019-05-22 DIAGNOSIS — M51.36 DEGENERATIVE DISC DISEASE, LUMBAR: ICD-10-CM

## 2019-05-22 DIAGNOSIS — G57.01 PIRIFORMIS SYNDROME, RIGHT: ICD-10-CM

## 2019-05-22 DIAGNOSIS — M53.3 PAIN OF RIGHT SACROILIAC JOINT: ICD-10-CM

## 2019-05-22 DIAGNOSIS — M54.16 LUMBAR RADICULOPATHY: Primary | ICD-10-CM

## 2019-05-22 PROCEDURE — 99214 OFFICE O/P EST MOD 30 MIN: CPT | Mod: PBBFAC | Performed by: PHYSICIAN ASSISTANT

## 2019-05-22 PROCEDURE — 99214 OFFICE O/P EST MOD 30 MIN: CPT | Mod: S$PBB,,, | Performed by: PHYSICIAN ASSISTANT

## 2019-05-22 PROCEDURE — 99999 PR PBB SHADOW E&M-EST. PATIENT-LVL IV: ICD-10-PCS | Mod: PBBFAC,,, | Performed by: PHYSICIAN ASSISTANT

## 2019-05-22 PROCEDURE — 99999 PR PBB SHADOW E&M-EST. PATIENT-LVL IV: CPT | Mod: PBBFAC,,, | Performed by: PHYSICIAN ASSISTANT

## 2019-05-22 PROCEDURE — 99214 PR OFFICE/OUTPT VISIT, EST, LEVL IV, 30-39 MIN: ICD-10-PCS | Mod: S$PBB,,, | Performed by: PHYSICIAN ASSISTANT

## 2019-05-22 RX ORDER — CYCLOBENZAPRINE HCL 10 MG
TABLET ORAL
Qty: 30 TABLET | Refills: 0 | Status: SHIPPED | OUTPATIENT
Start: 2019-05-22 | End: 2019-07-21 | Stop reason: SDUPTHER

## 2019-05-22 RX ORDER — METHOCARBAMOL 750 MG/1
750 TABLET, FILM COATED ORAL 2 TIMES DAILY PRN
Qty: 60 TABLET | Refills: 0 | Status: SHIPPED | OUTPATIENT
Start: 2019-05-22 | End: 2019-11-12 | Stop reason: SDUPTHER
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
## 2019-05-24 ENCOUNTER — PATIENT MESSAGE (OUTPATIENT)
Dept: GASTROENTEROLOGY | Facility: CLINIC | Age: 54
End: 2019-05-24

## 2019-05-24 DIAGNOSIS — R60.0 BILATERAL LEG EDEMA: ICD-10-CM

## 2019-05-24 DIAGNOSIS — Z29.89 PROPHYLACTIC IMMUNOTHERAPY: ICD-10-CM

## 2019-05-24 RX ORDER — FUROSEMIDE 40 MG/1
TABLET ORAL
Qty: 30 TABLET | Refills: 0 | Status: SHIPPED | OUTPATIENT
Start: 2019-05-24 | End: 2019-06-21 | Stop reason: SDUPTHER

## 2019-05-28 ENCOUNTER — PATIENT MESSAGE (OUTPATIENT)
Dept: OTOLARYNGOLOGY | Facility: CLINIC | Age: 54
End: 2019-05-28

## 2019-05-30 ENCOUNTER — PATIENT MESSAGE (OUTPATIENT)
Dept: PAIN MEDICINE | Facility: CLINIC | Age: 54
End: 2019-05-30

## 2019-05-31 ENCOUNTER — PATIENT MESSAGE (OUTPATIENT)
Dept: PAIN MEDICINE | Facility: CLINIC | Age: 54
End: 2019-05-31

## 2019-06-04 ENCOUNTER — LAB VISIT (OUTPATIENT)
Dept: LAB | Facility: HOSPITAL | Age: 54
End: 2019-06-04
Attending: INTERNAL MEDICINE
Payer: MEDICAID

## 2019-06-04 ENCOUNTER — CLINICAL SUPPORT (OUTPATIENT)
Dept: REHABILITATION | Facility: HOSPITAL | Age: 54
End: 2019-06-04
Payer: MEDICAID

## 2019-06-04 DIAGNOSIS — G89.29 CHRONIC BILATERAL LOW BACK PAIN WITH RIGHT-SIDED SCIATICA: ICD-10-CM

## 2019-06-04 DIAGNOSIS — R26.2 DIFFICULTY WALKING: ICD-10-CM

## 2019-06-04 DIAGNOSIS — M54.41 CHRONIC BILATERAL LOW BACK PAIN WITH RIGHT-SIDED SCIATICA: ICD-10-CM

## 2019-06-04 DIAGNOSIS — M62.81 MUSCLE WEAKNESS (GENERALIZED): ICD-10-CM

## 2019-06-04 DIAGNOSIS — Z94.4 LIVER REPLACED BY TRANSPLANT: ICD-10-CM

## 2019-06-04 LAB
ALBUMIN SERPL BCP-MCNC: 3.2 G/DL (ref 3.5–5.2)
ALP SERPL-CCNC: 180 U/L (ref 55–135)
ALT SERPL W/O P-5'-P-CCNC: 17 U/L (ref 10–44)
ANION GAP SERPL CALC-SCNC: 7 MMOL/L (ref 8–16)
AST SERPL-CCNC: 13 U/L (ref 10–40)
BASOPHILS # BLD AUTO: 0.05 K/UL (ref 0–0.2)
BASOPHILS NFR BLD: 0.5 % (ref 0–1.9)
BILIRUB SERPL-MCNC: 0.3 MG/DL (ref 0.1–1)
BUN SERPL-MCNC: 15 MG/DL (ref 6–20)
CALCIUM SERPL-MCNC: 9.4 MG/DL (ref 8.7–10.5)
CHLORIDE SERPL-SCNC: 105 MMOL/L (ref 95–110)
CO2 SERPL-SCNC: 28 MMOL/L (ref 23–29)
CREAT SERPL-MCNC: 0.8 MG/DL (ref 0.5–1.4)
DIFFERENTIAL METHOD: ABNORMAL
EOSINOPHIL # BLD AUTO: 0.1 K/UL (ref 0–0.5)
EOSINOPHIL NFR BLD: 1.3 % (ref 0–8)
ERYTHROCYTE [DISTWIDTH] IN BLOOD BY AUTOMATED COUNT: 15 % (ref 11.5–14.5)
EST. GFR  (AFRICAN AMERICAN): >60 ML/MIN/1.73 M^2
EST. GFR  (NON AFRICAN AMERICAN): >60 ML/MIN/1.73 M^2
GLUCOSE SERPL-MCNC: 85 MG/DL (ref 70–110)
HCT VFR BLD AUTO: 39.9 % (ref 37–48.5)
HGB BLD-MCNC: 12 G/DL (ref 12–16)
IMM GRANULOCYTES # BLD AUTO: 0.03 K/UL (ref 0–0.04)
IMM GRANULOCYTES NFR BLD AUTO: 0.3 % (ref 0–0.5)
LYMPHOCYTES # BLD AUTO: 2.2 K/UL (ref 1–4.8)
LYMPHOCYTES NFR BLD: 22.8 % (ref 18–48)
MAGNESIUM SERPL-MCNC: 1.8 MG/DL (ref 1.6–2.6)
MCH RBC QN AUTO: 24.1 PG (ref 27–31)
MCHC RBC AUTO-ENTMCNC: 30.1 G/DL (ref 32–36)
MCV RBC AUTO: 80 FL (ref 82–98)
MONOCYTES # BLD AUTO: 0.5 K/UL (ref 0.3–1)
MONOCYTES NFR BLD: 5.7 % (ref 4–15)
NEUTROPHILS # BLD AUTO: 6.6 K/UL (ref 1.8–7.7)
NEUTROPHILS NFR BLD: 69.4 % (ref 38–73)
NRBC BLD-RTO: 0 /100 WBC
PLATELET # BLD AUTO: 72 K/UL (ref 150–350)
PMV BLD AUTO: 11.3 FL (ref 9.2–12.9)
POTASSIUM SERPL-SCNC: 4.7 MMOL/L (ref 3.5–5.1)
PROT SERPL-MCNC: 7.1 G/DL (ref 6–8.4)
RBC # BLD AUTO: 4.97 M/UL (ref 4–5.4)
SODIUM SERPL-SCNC: 140 MMOL/L (ref 136–145)
WBC # BLD AUTO: 9.44 K/UL (ref 3.9–12.7)

## 2019-06-04 PROCEDURE — 83735 ASSAY OF MAGNESIUM: CPT

## 2019-06-04 PROCEDURE — 97140 MANUAL THERAPY 1/> REGIONS: CPT

## 2019-06-04 PROCEDURE — 85025 COMPLETE CBC W/AUTO DIFF WBC: CPT

## 2019-06-04 PROCEDURE — 97110 THERAPEUTIC EXERCISES: CPT

## 2019-06-04 PROCEDURE — 80053 COMPREHEN METABOLIC PANEL: CPT

## 2019-06-04 PROCEDURE — 36415 COLL VENOUS BLD VENIPUNCTURE: CPT

## 2019-06-04 PROCEDURE — 80197 ASSAY OF TACROLIMUS: CPT

## 2019-06-04 NOTE — PROGRESS NOTES
Physical Therapy Progress Treatment Note     Name: Marcie Bazan  Clinic Number: [id number]    Therapy Diagnosis:   Encounter Diagnoses   Name Primary?    Chronic bilateral low back pain with right-sided sciatica     Difficulty walking     Muscle weakness (generalized)      Physician: Antonio Horan, *    Visit Date: 2019    Physician Orders: PT EVAL AND TREAT  Medical Diagnosis: bilateral great trochanteric bursitis, OA of lumbar spine, SI pain  Evaluation Date: 3/26/2019  Authorization Period Expiration: 2019  Plan of Care Certification Period: 2019  Visit #/Visits authorized:      Time In: 8:50  Time Out: 10:05  Total Billable Time: 59 minutes    Precautions: Standard, Immunosuppression and organ transplant    Subjective     Pt reports: Patient states she feels good today.  She has 0/10 pain coming to therapy today.  She states they have been changing her meds for pain control.  The medicine has her very tired and she isn't able to do too much.  She states last week wasn't a good week.    She was compliant with home exercise program.    Response to previous treatment: Patient had less pain since last treatment    Functional change: Patient was able to turn around to look into backseat while sitting in passenger seat without pain.    Pain: 0/10  Location: back    Objective     Marcie received therapeutic exercises to develop strength, endurance, ROM, flexibility and posture for 34 minutes including:  Treadmill Walkin.9 mph, 4 minutes (for endurance, LE strength and erect posture)  Hamstring Stretch with Strap: 2 minutes each (4 minutes total)  Lower Trunk Rotation: on swiss ball, 3 minutes  Piriformis Stretch: 2 minutes each (4 minutes total)  Double Knee to Chest with Abdominal Bracing on Swiss Ball: 2 minutes  Seated Hip Abduction: Yellow Theraband, 3 minutes  Seated Hip Adduction: 3 minutes  IT Band Stretch: 2 minutes each (4 minutes total)  Bridges: 3 minutes  Clamshells: 2  minutes each (4 minutes total)    Marcie received the following manual therapy techniques: Myofacial release and Soft tissue Mobilization were applied to the: bilateral hip and legs for 25 minutes, including:  STM/MFR to bilateral lumbar parapinals, quadratus lumborum, gluteus medius, gluteus minimus, piriformis, IT band, quad, hip flexor, hamstring, and gastroc/soleus.  Bilateral Long leg distraction.       Home Exercises Provided and Patient Education Provided     Education provided:   - Patient educated on resting when needed as well as using core activation with all activities and movements to protect back.    Written Home Exercises Provided: Patient instructed to cont prior HEP.  Exercises were reviewed and Marcie was able to demonstrate them prior to the end of the session.  Marcie demonstrated good  understanding of the education provided.     See EMR under Patient Instructions for exercises provided prior visit.    Assessment     Patient with improved gait pattern at the beginning of therapy session today.  After walking on treadmill for 4 minutes, patient demonstrated more of a forward flexed trunk and wide base of support.  Patient unable to tolerate seated exercises with core activation without increased back pain symptoms.  Patient had to be walked to car by therapist secondary to weakness and pain.  Therapist has increased difficulty progressing patient secondary to increased with core activation and changing of positions.    Marcie is progressing well towards her goals.   Pt prognosis is Excellent.     Pt will continue to benefit from skilled outpatient physical therapy to address the deficits listed in the problem list box on initial evaluation, provide pt/family education and to maximize pt's level of independence in the home and community environment.     Pt's spiritual, cultural and educational needs considered and pt agreeable to plan of care and goals.     Anticipated barriers to physical therapy:  motivation, family situation    Goals: Short Term Goals:  (4 weeks)  1.  Patient will demonstrate 40% improved lumbar AROM.  2.  Patient will demonstrate 40% improved hip AROM.  3.  Patient will demonstrate 3/5 bilateral LE strength.  4.  Patient will have 5/10 pain overall.     Long Term Goals:  (8 weeks)  1.  Patient will demonstrate 80% improved lumbar AROM.  2.  Patient will demonstrate 80% improved hip AROM.  3.  Patient will demonstrate 4/5 bilateral LE strength.  4.  Patient will have 2/10 pain overall.      Plan     Continue with current plan of care. Progress exercises to including standing gastroc and soleus stretches as well as resuming seated marches with abdominal bracing.    Laura Patrick, PT , DPT

## 2019-06-05 ENCOUNTER — TELEPHONE (OUTPATIENT)
Dept: PAIN MEDICINE | Facility: CLINIC | Age: 54
End: 2019-06-05

## 2019-06-05 LAB — TACROLIMUS BLD-MCNC: 5.5 NG/ML (ref 5–15)

## 2019-06-05 NOTE — TELEPHONE ENCOUNTER
----- Message from Ellie Arellano sent at 6/5/2019  2:43 PM CDT -----  Contact: Self  Patient requesting a call back regarding injection appointment. She states that she has been waiting for someone to send her some information. Please call patient back at [phone]      Thanks,  Ellie Arellano

## 2019-06-05 NOTE — TELEPHONE ENCOUNTER
----- Message from Pat Martinez MA sent at 6/5/2019  2:54 PM CDT -----  Contact: Self      ----- Message -----  From: Ellie Arellano  Sent: 6/5/2019   2:43 PM  To: Minna BARTHOLOMEW Staff    Patient requesting a call back regarding injection appointment. She states that she has been waiting for someone to send her some information. Please call patient back at [phone]      Thanks,  Ellie Arellano

## 2019-06-06 ENCOUNTER — TELEPHONE (OUTPATIENT)
Dept: TRANSPLANT | Facility: CLINIC | Age: 54
End: 2019-06-06

## 2019-06-06 ENCOUNTER — PATIENT MESSAGE (OUTPATIENT)
Dept: TRANSPLANT | Facility: CLINIC | Age: 54
End: 2019-06-06

## 2019-06-06 DIAGNOSIS — Z94.4 LIVER REPLACED BY TRANSPLANT: Primary | ICD-10-CM

## 2019-06-06 DIAGNOSIS — E83.42 HYPOMAGNESEMIA: ICD-10-CM

## 2019-06-06 NOTE — TELEPHONE ENCOUNTER
Patient notified and instructed via Portal; labs reviewed, stable. No medicine changes made. Repeat labs due 6/24/19.

## 2019-06-06 NOTE — TELEPHONE ENCOUNTER
----- Message from Joselin Souza MD sent at 6/6/2019  2:06 PM CDT -----  Reviewed, nothing to do; repeat per routine

## 2019-06-14 ENCOUNTER — HOSPITAL ENCOUNTER (OUTPATIENT)
Dept: RADIOLOGY | Facility: HOSPITAL | Age: 54
Discharge: HOME | End: 2019-06-14
Attending: PHYSICIAN ASSISTANT | Admitting: PAIN MEDICINE
Payer: MEDICAID

## 2019-06-14 ENCOUNTER — HOSPITAL ENCOUNTER (OUTPATIENT)
Facility: HOSPITAL | Age: 54
Discharge: HOME | End: 2019-06-14
Attending: PAIN MEDICINE | Admitting: PAIN MEDICINE
Payer: MEDICAID

## 2019-06-14 VITALS
TEMPERATURE: 97 F | OXYGEN SATURATION: 100 % | BODY MASS INDEX: 35.75 KG/M2 | WEIGHT: 201.75 LBS | HEART RATE: 70 BPM | SYSTOLIC BLOOD PRESSURE: 125 MMHG | DIASTOLIC BLOOD PRESSURE: 68 MMHG | HEIGHT: 63 IN | RESPIRATION RATE: 16 BRPM

## 2019-06-14 DIAGNOSIS — M53.3 PAIN OF RIGHT SACROILIAC JOINT: ICD-10-CM

## 2019-06-14 DIAGNOSIS — G57.01 PIRIFORMIS SYNDROME, RIGHT: ICD-10-CM

## 2019-06-14 DIAGNOSIS — M53.3 SACROILIAC JOINT DYSFUNCTION: ICD-10-CM

## 2019-06-14 PROCEDURE — 99152 MOD SED SAME PHYS/QHP 5/>YRS: CPT | Mod: ,,, | Performed by: PAIN MEDICINE

## 2019-06-14 PROCEDURE — 20552 NJX 1/MLT TRIGGER POINT 1/2: CPT

## 2019-06-14 PROCEDURE — 25500020 PHARM REV CODE 255: Performed by: PAIN MEDICINE

## 2019-06-14 PROCEDURE — 27096 PR INJECTION,SACROILIAC JOINT: ICD-10-PCS | Mod: RT,,, | Performed by: PAIN MEDICINE

## 2019-06-14 PROCEDURE — 20552 NJX 1/MLT TRIGGER POINT 1/2: CPT | Mod: 59,RT,, | Performed by: PAIN MEDICINE

## 2019-06-14 PROCEDURE — 63600175 PHARM REV CODE 636 W HCPCS: Performed by: PAIN MEDICINE

## 2019-06-14 PROCEDURE — 25000003 PHARM REV CODE 250: Performed by: PAIN MEDICINE

## 2019-06-14 PROCEDURE — 27096 INJECT SACROILIAC JOINT: CPT | Mod: RT,,, | Performed by: PAIN MEDICINE

## 2019-06-14 PROCEDURE — 99152 PR MOD CONSCIOUS SEDATION, SAME PHYS, 5+ YRS, FIRST 15 MIN: ICD-10-PCS | Mod: ,,, | Performed by: PAIN MEDICINE

## 2019-06-14 PROCEDURE — 27096 INJECT SACROILIAC JOINT: CPT | Performed by: PAIN MEDICINE

## 2019-06-14 PROCEDURE — 20552 PR INJECT TRIGGER POINT, 1 OR 2: ICD-10-PCS | Mod: 59,RT,, | Performed by: PAIN MEDICINE

## 2019-06-14 RX ORDER — LIDOCAINE HYDROCHLORIDE 20 MG/ML
INJECTION, SOLUTION EPIDURAL; INFILTRATION; INTRACAUDAL; PERINEURAL
Status: DISCONTINUED | OUTPATIENT
Start: 2019-06-14 | End: 2019-06-14 | Stop reason: HOSPADM

## 2019-06-14 RX ORDER — METHYLPREDNISOLONE ACETATE 40 MG/ML
INJECTION, SUSPENSION INTRA-ARTICULAR; INTRALESIONAL; INTRAMUSCULAR; SOFT TISSUE
Status: DISCONTINUED | OUTPATIENT
Start: 2019-06-14 | End: 2019-06-14 | Stop reason: HOSPADM

## 2019-06-14 RX ORDER — FENTANYL CITRATE 50 UG/ML
INJECTION, SOLUTION INTRAMUSCULAR; INTRAVENOUS
Status: DISCONTINUED | OUTPATIENT
Start: 2019-06-14 | End: 2019-06-14 | Stop reason: HOSPADM

## 2019-06-14 RX ORDER — MIDAZOLAM HYDROCHLORIDE 1 MG/ML
INJECTION, SOLUTION INTRAMUSCULAR; INTRAVENOUS
Status: DISCONTINUED | OUTPATIENT
Start: 2019-06-14 | End: 2019-06-14 | Stop reason: HOSPADM

## 2019-06-14 NOTE — DISCHARGE INSTRUCTIONS

## 2019-06-14 NOTE — OP NOTE
PROCEDURE: Sacroiliac joint injection under fluoroscopic guidance; right piriformis injection     SIDE: right      PROCEDURE DATE: 6/14/2019    PREOPERATIVE DIAGNOSIS: Sacroiliitis, piriformis syndrome  POSTOPERATIVE DIAGNOSIS: Sacroiliitis, piriformis syndrome    PROVIDER: KATJA Desai MD  Assistant(s): None    ANESTHESIA: Local, IV Sedation    >> 1 mg of VERSED    >> 50 mcg of FENTANYL     INDICATION: The patient has a history of pain due to sacroiliitis unresponsive to conservative treatments. Fluoroscopy was used to optimize visualization of needle placement and to maximize safety.     PROCEDURE DESCRIPTION: The patient was seen and identified in the preoperative area. Risks, benefits, complications, and alternatives were discussed with the patient. The patient agreed to proceed with the procedure and signed the consent. The site and side of the procedure was identified and marked. An IV was started.     The patient was taken to the procedural suite. The patient was positioned in prone orientation on procedure table. A time out was performed prior to any intervention. The procedure, site, side, and allergies were stated and agreed to by all present. The lumbosacral area was widely prepped with ChloraPrep. The procedural site was draped in usual sterile fashion. Vital signs were closely monitored throughout this procedure. Conscious sedation was used for this procedure to decrease patient anxiety.    The fluoroscopic camera was placed in contralateral oblique view and was adjusted until the anterior and posterior joint margins of the targeted sacroiliac joint aligned in linear array. The lower pole of the joint was identified, marked, and localized with 1% Lidocaine. A 25 gauge 3.5 inch spinal needle was introduced and advanced to the joint under fluoroscopic guidance. The joint space was entered and after negative aspiration, 2 mL of injectate was posited into the joint space. The needle was then withdrawn  outside of the joint space and after negative aspiration 1 mL of solution was injected outside of the joint space. The injectant solution used was comprised of 5 mL of 2% lidocaine and 2 mL of Methylprednisolone (40 mg/mL) with 4ml injected at this site. This techniques was performed for the above noted joint/s.    Right Piriformis Muscle Injection:   The fluoroscope was used to crisp up the Right sacroiliac joint and the inferior border of the ilium. A 25 G 3.5 inch needle was then advanced under fluoroscopic guidance to contact the ilium after which the needle was withdrawn and redirected slightly anteriorly to lie within the substance of the piriformis muscle. This was confirmed with instillation of 0.5 ml of contrast dye Omnipaque (Iohexamide 240 mg/ml) which delineated the course of the piriformis muscle diagonally from the origin on the anteroinferior sacrum to the insertion on the greater trochanter of the femur after negative aspiration for blood or air. After confirming good spread of the contrast, 3ml of the  Methylprednisolone (40 mg/mL) and 2 mL of 1% PF Lidocaine  was injected. The needle was then removed intact.        Description of Findings: Not applicable    Prosthetic devices, grafts, tissues, or devices implanted: None    Specimen Removed: No    Estimated Blood Loss: minimal    COMPLICATIONS: None    DISPOSITION / PLANS: The patient was transferred to the recovery area in a stable condition for observation. The patient was reexamined prior to discharge. There was no evidence of acute neurologic injury following the procedure.  Patient was discharged from the recovery room after meeting discharge criteria. Home discharge instructions were given to the patient by the staff.

## 2019-06-14 NOTE — PLAN OF CARE
Received patient to unit via stretcher. Received patient status report and AVS from procedure nurse. Patient monitoring system attached, vital signs obtained and assessed. Arousable to request, oriented x 4. Denies pain.

## 2019-06-18 NOTE — DISCHARGE SUMMARY
[facility]  Short Stay  Discharge Summary    Admit Date: 6/14/2019    Discharge Date and Time: 6/14/2019 12:50 PM      Discharge Attending Physician: KATJA Desai MD     Hospital Course (synopsis of major diagnoses, care, treatment, and services provided during the course of the hospital stay): Patient was admitted to Pre-op where informed consent was signed.  The patient was then taken to the procedure suite where the procedure was performed.  The patient was then return to the Pre-Op area and discharge was performed.     Final Diagnoses:    Principal Problem: <principal problem not specified>   Secondary Diagnoses:   Active Hospital Problems    Diagnosis  POA    Sacroiliac joint dysfunction [M53.3]  Yes      Resolved Hospital Problems   No resolved problems to display.       Discharged Condition: good    Disposition: Home or Self Care    Follow up/Patient Instructions:    Medications:  Reconciled Home Medications:      Medication List      ASK your doctor about these medications    atorvastatin 40 MG tablet  Commonly known as:  LIPITOR  Take 1 tablet (40 mg total) by mouth once daily.     azelastine 137 mcg (0.1 %) nasal spray  Commonly known as:  ASTELIN  2 sprays (274 mcg total) by Nasal route 2 (two) times daily as needed for Rhinitis.     BIOTIN ORAL  Take by mouth.     cyclobenzaprine 10 MG tablet  Commonly known as:  FLEXERIL  Take 1/2 to 1 tab QHS PRN muscle spasms. May cause drowsiness.     diclofenac sodium (bulk) 100 % Powd  Apply 1 application topically daily as needed.     ergocalciferol 50,000 unit Cap  Commonly known as:  ERGOCALCIFEROL  TAKE 1 CAPSULE BY MOUTH EVERY 7 DAYS     famotidine 20 MG tablet  Commonly known as:  PEPCID  Take 1 tablet (20 mg total) by mouth 2 (two) times daily.     furosemide 40 MG tablet  Commonly known as:  LASIX  TAKE 1 TABLET(40 MG) BY MOUTH EVERY DAY     gabapentin 600 MG tablet  Commonly known as:  NEURONTIN  TAKE 1 AND 1/2 TABLETS BY MOUTH THREE  TIMES DAILY. MAY CAUSE DROWSINESS     levocetirizine 5 MG tablet  Commonly known as:  XYZAL  Take 1 tablet (5 mg total) by mouth every evening.     * magnesium oxide 400 mg (241.3 mg magnesium) tablet  Commonly known as:  MAG-OX  Take 2 tablets (800 mg total) by mouth 2 (two) times daily.     * magnesium oxide 400 mg (241.3 mg magnesium) tablet  Commonly known as:  MAG-OX  Take 2 tablets (800 mg total) by mouth 2 (two) times daily.     methocarbamol 750 MG Tab  Commonly known as:  ROBAXIN  Take 1 tablet (750 mg total) by mouth 2 (two) times daily as needed (muscle spasms).     methylPREDNISolone 4 mg tablet  Commonly known as:  MEDROL DOSEPACK  Take as directed on the package.     NIFEdipine 60 MG (OSM) 24 hr tablet  Commonly known as:  PROCARDIA-XL  Take 1 tablet (60 mg total) by mouth once daily.     tacrolimus 1 MG Cap  Commonly known as:  PROGRAF  Take 2 capsules (2 mg total) by mouth every 12 (twelve) hours.     traZODone 50 MG tablet  Commonly known as:  DESYREL  Take 0.5 tablets (25 mg total) by mouth every evening.         * This list has 2 medication(s) that are the same as other medications prescribed for you. Read the directions carefully, and ask your doctor or other care provider to review them with you.              No discharge procedures on file.

## 2019-06-19 ENCOUNTER — CLINICAL SUPPORT (OUTPATIENT)
Dept: REHABILITATION | Facility: HOSPITAL | Age: 54
End: 2019-06-19
Payer: MEDICAID

## 2019-06-19 DIAGNOSIS — M54.41 CHRONIC BILATERAL LOW BACK PAIN WITH RIGHT-SIDED SCIATICA: ICD-10-CM

## 2019-06-19 DIAGNOSIS — M62.81 MUSCLE WEAKNESS (GENERALIZED): ICD-10-CM

## 2019-06-19 DIAGNOSIS — R26.2 DIFFICULTY WALKING: ICD-10-CM

## 2019-06-19 DIAGNOSIS — G89.29 CHRONIC BILATERAL LOW BACK PAIN WITH RIGHT-SIDED SCIATICA: ICD-10-CM

## 2019-06-19 PROCEDURE — 97110 THERAPEUTIC EXERCISES: CPT

## 2019-06-19 PROCEDURE — 97140 MANUAL THERAPY 1/> REGIONS: CPT

## 2019-06-19 NOTE — PLAN OF CARE
Outpatient Therapy Updated Plan of Care     Visit Date: 6/19/2019  Name: Marcie Bazan  Clinic Number: [id number]    Therapy Diagnosis:   Encounter Diagnoses   Name Primary?    Chronic bilateral low back pain with right-sided sciatica     Difficulty walking     Muscle weakness (generalized)      Physician: Antonio Horan, *    Physician Orders: PT EVAL and TREAT  Medical Diagnosis: Greater Trochanteric Bursitis, OA lumbar spine, SI pain  Evaluation Date: 3/26/2019    Total Visits Received: 11  Cancelled Visits: 7  No Show Visits: 1    Current Certification Period:  3/26/2019 to 5/26/2019  Precautions:  Standard, immunosuppression, organ transplant  Visits from Evaluation Date:  11  Functional Level Prior to Evaluation:  Patient with poor endurance and unable to activity at home without fatigue or pain.    Subjective     Update: Patient had a steroid shot on 6/14/2019 and has had significantly less pain.  Patient with some increased right leg pain with therapy and activity.    Objective     Update: Improved lumbar AROM and left LE strength.  Patient able to perform some core strengthening exercises.    Assessment     Update: Patient continues to have increased tissue tension in bilateral LEs as well as pain with exercises and activity.  Patient hasn't been able to be progressed to standing/erect exercises secondary to increased pain and weakness in bilateral LEs with activity.     Previous Short Term Goals Status:   Short Term Goals:  (4 weeks)  1.  Patient will demonstrate 40% improved lumbar AROM. (GOAL MET 6/19/2019)  2.  Patient will demonstrate 40% improved hip AROM. (GOAL MET 6/19/2019)  3.  Patient will demonstrate 3/5 bilateral LE strength. (GOAL MET 6/19/2019)  4.  Patient will have 5/10 pain overall. (GOAL MET 6/19/2019)     Long Term Goals:  (8 weeks)  1.  Patient will demonstrate 80% improved lumbar AROM. (GOAL PROGRESSING)  2.  Patient will demonstrate 80% improved hip AROM. (GOAL  PROGRESSING)  3.  Patient will demonstrate 4/5 bilateral LE strength. (GOAL PROGRESSING)  4.  Patient will have 2/10 pain overall. (GOAL PROGRESSING)    New Short Term Goals Status:   All short term goals are met.  Continue with long term goals.  Long Term Goal Status:   continue per initial plan of care.  Reasons for Recertification of Therapy:   Patient hasn't reached max therapy potential.  Patient's progress has been slow secondary to controllable pain that required interventional pain management.    Plan     Updated Certification Period: 6/19/2019 to 8/2/2019  Recommended Treatment Plan: 2 times per week for 6 weeks: Aquatic Therapy, Electrical Stimulation ,, Gait Training, Manual Therapy, Moist Heat/ Ice, Neuromuscular Re-ed, Patient Education, Self Care, Therapeutic Activites and Therapeutic Exercise  Other Recommendations: If patient is able to get up and down 8 inch step in pool, attempt aquatic therapy.    Laura Patrick, PT  6/19/2019      I CERTIFY THE NEED FOR THESE SERVICES FURNISHED UNDER THIS PLAN OF TREATMENT AND WHILE UNDER MY CARE    Physician's comments:        Physician's Signature: ___________________________________________________

## 2019-06-20 ENCOUNTER — PATIENT MESSAGE (OUTPATIENT)
Dept: GASTROENTEROLOGY | Facility: CLINIC | Age: 54
End: 2019-06-20

## 2019-06-20 DIAGNOSIS — E55.9 VITAMIN D DEFICIENCY: ICD-10-CM

## 2019-06-20 DIAGNOSIS — Z29.89 PROPHYLACTIC IMMUNOTHERAPY: ICD-10-CM

## 2019-06-20 DIAGNOSIS — R60.0 BILATERAL LEG EDEMA: ICD-10-CM

## 2019-06-20 RX ORDER — ERGOCALCIFEROL 1.25 MG/1
CAPSULE ORAL
Qty: 10 CAPSULE | Refills: 0 | OUTPATIENT
Start: 2019-06-20

## 2019-06-20 NOTE — TELEPHONE ENCOUNTER
Patient notified of next appt date, requests more than one refill on lasix      ----- Message from Tracie Whatley sent at 6/20/2019  2:16 PM CDT -----  Contact: self/[phone]  Would like to consult with nurse regarding her next appt. Please call back at [phone]. Thanks/ar

## 2019-06-21 DIAGNOSIS — Z29.89 PROPHYLACTIC IMMUNOTHERAPY: ICD-10-CM

## 2019-06-21 DIAGNOSIS — R60.0 BILATERAL LEG EDEMA: ICD-10-CM

## 2019-06-21 RX ORDER — FUROSEMIDE 40 MG/1
TABLET ORAL
Qty: 30 TABLET | Refills: 2 | OUTPATIENT
Start: 2019-06-21

## 2019-06-21 RX ORDER — FUROSEMIDE 40 MG/1
TABLET ORAL
Qty: 30 TABLET | Refills: 0 | Status: SHIPPED | OUTPATIENT
Start: 2019-06-21 | End: 2019-07-23 | Stop reason: SDUPTHER

## 2019-06-24 DIAGNOSIS — E83.42 HYPOMAGNESEMIA: ICD-10-CM

## 2019-06-25 ENCOUNTER — LAB VISIT (OUTPATIENT)
Dept: LAB | Facility: HOSPITAL | Age: 54
End: 2019-06-25
Attending: INTERNAL MEDICINE
Payer: MEDICAID

## 2019-06-25 DIAGNOSIS — Z94.4 LIVER REPLACED BY TRANSPLANT: ICD-10-CM

## 2019-06-25 LAB
ALBUMIN SERPL BCP-MCNC: 3.3 G/DL (ref 3.5–5.2)
ALP SERPL-CCNC: 180 U/L (ref 55–135)
ALT SERPL W/O P-5'-P-CCNC: 22 U/L (ref 10–44)
ANION GAP SERPL CALC-SCNC: 8 MMOL/L (ref 8–16)
AST SERPL-CCNC: 26 U/L (ref 10–40)
BASOPHILS # BLD AUTO: 0.03 K/UL (ref 0–0.2)
BASOPHILS NFR BLD: 0.3 % (ref 0–1.9)
BILIRUB SERPL-MCNC: 0.3 MG/DL (ref 0.1–1)
BUN SERPL-MCNC: 14 MG/DL (ref 6–20)
CALCIUM SERPL-MCNC: 9.8 MG/DL (ref 8.7–10.5)
CHLORIDE SERPL-SCNC: 102 MMOL/L (ref 95–110)
CO2 SERPL-SCNC: 28 MMOL/L (ref 23–29)
CREAT SERPL-MCNC: 0.9 MG/DL (ref 0.5–1.4)
DIFFERENTIAL METHOD: ABNORMAL
EOSINOPHIL # BLD AUTO: 0.1 K/UL (ref 0–0.5)
EOSINOPHIL NFR BLD: 0.9 % (ref 0–8)
ERYTHROCYTE [DISTWIDTH] IN BLOOD BY AUTOMATED COUNT: 15.1 % (ref 11.5–14.5)
EST. GFR  (AFRICAN AMERICAN): >60 ML/MIN/1.73 M^2
EST. GFR  (NON AFRICAN AMERICAN): >60 ML/MIN/1.73 M^2
GLUCOSE SERPL-MCNC: 78 MG/DL (ref 70–110)
HCT VFR BLD AUTO: 41.1 % (ref 37–48.5)
HGB BLD-MCNC: 12.6 G/DL (ref 12–16)
IMM GRANULOCYTES # BLD AUTO: 0.05 K/UL (ref 0–0.04)
IMM GRANULOCYTES NFR BLD AUTO: 0.4 % (ref 0–0.5)
LYMPHOCYTES # BLD AUTO: 2.3 K/UL (ref 1–4.8)
LYMPHOCYTES NFR BLD: 19.2 % (ref 18–48)
MAGNESIUM SERPL-MCNC: 2 MG/DL (ref 1.6–2.6)
MCH RBC QN AUTO: 24 PG (ref 27–31)
MCHC RBC AUTO-ENTMCNC: 30.7 G/DL (ref 32–36)
MCV RBC AUTO: 78 FL (ref 82–98)
MONOCYTES # BLD AUTO: 0.8 K/UL (ref 0.3–1)
MONOCYTES NFR BLD: 6.4 % (ref 4–15)
NEUTROPHILS # BLD AUTO: 8.7 K/UL (ref 1.8–7.7)
NEUTROPHILS NFR BLD: 72.8 % (ref 38–73)
NRBC BLD-RTO: 0 /100 WBC
PLATELET # BLD AUTO: 67 K/UL (ref 150–350)
PMV BLD AUTO: 11.4 FL (ref 9.2–12.9)
POTASSIUM SERPL-SCNC: 4.6 MMOL/L (ref 3.5–5.1)
PROT SERPL-MCNC: 7.2 G/DL (ref 6–8.4)
RBC # BLD AUTO: 5.24 M/UL (ref 4–5.4)
SODIUM SERPL-SCNC: 138 MMOL/L (ref 136–145)
WBC # BLD AUTO: 11.97 K/UL (ref 3.9–12.7)

## 2019-06-25 PROCEDURE — 80053 COMPREHEN METABOLIC PANEL: CPT

## 2019-06-25 PROCEDURE — 36415 COLL VENOUS BLD VENIPUNCTURE: CPT

## 2019-06-25 PROCEDURE — 85025 COMPLETE CBC W/AUTO DIFF WBC: CPT

## 2019-06-25 PROCEDURE — 80197 ASSAY OF TACROLIMUS: CPT

## 2019-06-25 PROCEDURE — 83735 ASSAY OF MAGNESIUM: CPT

## 2019-06-26 DIAGNOSIS — Z94.4 LIVER REPLACED BY TRANSPLANT: ICD-10-CM

## 2019-06-26 LAB — TACROLIMUS BLD-MCNC: 4.4 NG/ML (ref 5–15)

## 2019-06-26 RX ORDER — LANOLIN ALCOHOL/MO/W.PET/CERES
CREAM (GRAM) TOPICAL
Qty: 270 TABLET | Refills: 0 | Status: SHIPPED | OUTPATIENT
Start: 2019-06-26 | End: 2019-09-16 | Stop reason: DRUGHIGH

## 2019-06-26 NOTE — TELEPHONE ENCOUNTER
----- Message from Benji Lund MD sent at 6/26/2019  4:23 PM CDT -----  FK: 4.4, increase tacro to 3/2, otherwise labs are stable. Repeat labs in 2 weeks.

## 2019-06-26 NOTE — TELEPHONE ENCOUNTER
Dr Lund reviewed labs  Sent patient message to let her know labs were stable, Prograf level a little low.  Please increase Prograf to 3 mg in the morning and 2 mg in the evening.  Repeat labs on 07/08/19.

## 2019-06-27 RX ORDER — TACROLIMUS 1 MG/1
CAPSULE ORAL
Qty: 150 CAPSULE | Refills: 11 | Status: SHIPPED | OUTPATIENT
Start: 2019-06-27 | End: 2019-10-03

## 2019-06-28 ENCOUNTER — PATIENT MESSAGE (OUTPATIENT)
Dept: INTERNAL MEDICINE | Facility: CLINIC | Age: 54
End: 2019-06-28

## 2019-06-28 DIAGNOSIS — E55.9 VITAMIN D DEFICIENCY: ICD-10-CM

## 2019-06-28 RX ORDER — ERGOCALCIFEROL 1.25 MG/1
50000 CAPSULE ORAL
Qty: 10 CAPSULE | Refills: 0 | Status: SHIPPED | OUTPATIENT
Start: 2019-06-28 | End: 2019-09-02 | Stop reason: SDUPTHER

## 2019-07-01 ENCOUNTER — CLINICAL SUPPORT (OUTPATIENT)
Dept: REHABILITATION | Facility: HOSPITAL | Age: 54
End: 2019-07-01
Payer: MEDICAID

## 2019-07-01 DIAGNOSIS — R26.2 DIFFICULTY WALKING: ICD-10-CM

## 2019-07-01 DIAGNOSIS — M62.81 MUSCLE WEAKNESS (GENERALIZED): ICD-10-CM

## 2019-07-01 DIAGNOSIS — M54.41 CHRONIC BILATERAL LOW BACK PAIN WITH RIGHT-SIDED SCIATICA: ICD-10-CM

## 2019-07-01 DIAGNOSIS — G89.29 CHRONIC BILATERAL LOW BACK PAIN WITH RIGHT-SIDED SCIATICA: ICD-10-CM

## 2019-07-01 PROCEDURE — 97140 MANUAL THERAPY 1/> REGIONS: CPT

## 2019-07-01 PROCEDURE — 97110 THERAPEUTIC EXERCISES: CPT

## 2019-07-01 NOTE — PROGRESS NOTES
Physical Therapy Daily Treatment Note     Name: Marcie Bazan  Clinic Number: [id number]    Therapy Diagnosis:   Encounter Diagnoses   Name Primary?    Chronic bilateral low back pain with right-sided sciatica     Difficulty walking     Muscle weakness (generalized)      Physician: Antonio Horan, *    Visit Date: 7/1/2019    Physician Orders: PT EVAL and TREAT  Medical Diagnosis: bilateral greater trochanteric bursitis, OA lumbar spine, SI pain  Evaluation Date: 3/26/2019  Authorization Period Expiration:9/19/2019  Plan of Care Certification Period: 5/26/2019  Visit #/Visits authorized: 12/ 20     Time In: 9:00  Time Out: 9:51  Total Billable Time: 49 minutes    Precautions: Standard, Immunosuppression and organ transplant    Subjective     Pt reports: she started feeling bad on Thursday.  She had a rough Friday and Saturday.  She was in bed all day Friday and started moving around on Saturday.  Sunday, she was feeling much better and able to do more.  She has 0/10 pain today.    .  She was compliant with home exercise program.  Response to previous treatment: Patient has less pain for periods of time.  Functional change: Patient is able to do more when in pain free period.    Pain: 0/10  Location: right lower legs and upper legs     Objective     Marcie received therapeutic exercises to develop strength, endurance, ROM, flexibility, posture and core stabilization for 24 minutes of direct 1:1 including:   Walking on Treadmill: 0 incline, 0.9 mph, 6 minutes (for endurance, LE strength and erect posture)  Piriformis Stretch: 2 minutes each (4 minutes total)  Abdominal Bracing: 3 minutes  Gluteal Sets: 3 minutes  Hamstring Stretch: 2 minutes each (4 minutes total)  IT Band Stretch: 2 minutes each (4 minutes total)    Marcie received the following manual therapy techniques: Manual traction, Myofacial release and Soft tissue Mobilization were applied to the: lumbar spine, bilateral LEs for 25 minutes,  including:  Lumbar Distraction with Sheet, bilateral long leg distraction, STM/MFR to bilateral quad, hip flexor,  IT band and hamstring.        Home Exercises Provided and Patient Education Provided      Education provided:   - Patient educated on using core activation with all activities to help with back pain, leg pain and instability.     Written Home Exercises Provided: Patient instructed to cont prior HEP.  Exercises were reviewed and Marcie was able to demonstrate them prior to the end of the session.  Marcie demonstrated good  understanding of the education provided.     See EMR under Patient Instructions for exercises provided prior visit.    Assessment     Patient with less pain after exercises and manual therapy today.  Patient demonstrated increased tissue tension noted in right IT band, hamstring, and hip flexor.  Patient demonstrates poor ability to activate core without constant verbal cues.    Marcie is progressing well towards her goals.   Pt prognosis is Good.     Pt will continue to benefit from skilled outpatient physical therapy to address the deficits listed in the problem list box on initial evaluation, provide pt/family education and to maximize pt's level of independence in the home and community environment.     Pt's spiritual, cultural and educational needs considered and pt agreeable to plan of care and goals.     Anticipated barriers to physical therapy: poor body awareness, coping style, poor carry over.      Plan     Continue therapy for 2 times per week.  Progress core strengthening to standing activities as patient tolerates.    Laura Patrick, PT, DPT

## 2019-07-03 ENCOUNTER — CLINICAL SUPPORT (OUTPATIENT)
Dept: REHABILITATION | Facility: HOSPITAL | Age: 54
End: 2019-07-03
Payer: MEDICAID

## 2019-07-03 DIAGNOSIS — G89.29 CHRONIC BILATERAL LOW BACK PAIN WITH RIGHT-SIDED SCIATICA: ICD-10-CM

## 2019-07-03 DIAGNOSIS — R26.2 DIFFICULTY WALKING: ICD-10-CM

## 2019-07-03 DIAGNOSIS — M54.41 CHRONIC BILATERAL LOW BACK PAIN WITH RIGHT-SIDED SCIATICA: ICD-10-CM

## 2019-07-03 DIAGNOSIS — M62.81 MUSCLE WEAKNESS (GENERALIZED): ICD-10-CM

## 2019-07-03 PROCEDURE — 97140 MANUAL THERAPY 1/> REGIONS: CPT

## 2019-07-03 PROCEDURE — 97110 THERAPEUTIC EXERCISES: CPT

## 2019-07-03 NOTE — PROGRESS NOTES
Physical Therapy Daily Treatment Note     Name: Marcie Bazan  Clinic Number: [id number]    Therapy Diagnosis:   Encounter Diagnoses   Name Primary?    Chronic bilateral low back pain with right-sided sciatica     Difficulty walking     Muscle weakness (generalized)      Physician: Antonio Horan, *    Visit Date: 7/3/2019    Physician Orders: PT EVAL and TREAT  Medical Diagnosis: bilateral greater trochanteric bursitis, OA lumbar spine, SI pain  Evaluation Date: 3/26/2019  Authorization Period Expiration:9/19/2019  Plan of Care Certification Period: 5/26/2019  Visit #/Visits authorized: 13/ 20     Time In: 8:47  Time Out: 10:00  Total Billable Time: 56 minutes    Precautions: Standard, Immunosuppression and organ transplant    Subjective     Pt reports: she is feeling good today.  She doesn't have any discomfort.  She does report tenderness to touch in bilateral thighs.  Patient states she would like to work on stairs because she continues to have trouble with them.  .  She was compliant with home exercise program.  Response to previous treatment: Patient has less pain and able to do more.  Functional change: Patient was able to go with her granddaughter to a school event without extra pain.    Pain: 0/10  Location: right lower legs and upper legs     Objective     Marcie received therapeutic exercises to develop strength, endurance, ROM, flexibility, posture and core stabilization for 26 minutes of direct 1:, 6 minutes of supervision including:   Walking on Treadmill: 0 incline, 0.9 mph, 8 minutes (for endurance, LE strength and erect posture)  Piriformis Stretch: 2 minutes each (4 minutes total)  Abdominal Bracing: 3 minutes  Double Knee to Chest with Swiss Ball and Abdominal Bracing: 3 minutes  Standing Gastroc Stretch: 3 minutes  Standing Soleus Stretch: 3 minutes  Hamstring Stretch: 2 minutes each (4 minutes total)  IT Band Stretch: 2 minutes each (4 minutes total) (32 minutes total of therapeutic  exercises)    Marcie received the following manual therapy techniques: Manual traction, Myofacial release and Soft tissue Mobilization were applied to the: lumbar spine, bilateral LEs for 30 minutes, including:  Lumbar Distraction with Sheet, bilateral long leg distraction, STM/MFR to bilateral quad, hip flexor,  IT band and hamstring.        Home Exercises Provided and Patient Education Provided      Education provided:   - Ptatient educated on stretching legs and calves to help with pain control and tightness    Written Home Exercises Provided: Patient instructed to cont prior HEP.  Exercises were reviewed and Marcie was able to demonstrate them prior to the end of the session.  Marcie demonstrated good  understanding of the education provided.     See EMR under Patient Instructions for exercises provided prior visit.    Assessment     Patient requires max encouragement to perform exercises.  Patient with increased tissue tension noted in bilateral quads and IT bands as well as gastroc and soleus.  Patient tolerated all exercises well.    Marcie is progressing well towards her goals.   Pt prognosis is Good.     Pt will continue to benefit from skilled outpatient physical therapy to address the deficits listed in the problem list box on initial evaluation, provide pt/family education and to maximize pt's level of independence in the home and community environment.     Pt's spiritual, cultural and educational needs considered and pt agreeable to plan of care and goals.     Anticipated barriers to physical therapy: poor body awareness, coping style, poor carry over.      Plan     Continue therapy for 2 times per week.  Progress core strengthening to standing activities as patient tolerates.  Add forward and lateral step ups next visit.    Laura Patrick, PT, DPT

## 2019-07-08 ENCOUNTER — LAB VISIT (OUTPATIENT)
Dept: LAB | Facility: HOSPITAL | Age: 54
End: 2019-07-08
Attending: INTERNAL MEDICINE
Payer: MEDICAID

## 2019-07-08 DIAGNOSIS — Z94.4 LIVER REPLACED BY TRANSPLANT: ICD-10-CM

## 2019-07-08 LAB
ALBUMIN SERPL BCP-MCNC: 3.2 G/DL (ref 3.5–5.2)
ALP SERPL-CCNC: 173 U/L (ref 55–135)
ALT SERPL W/O P-5'-P-CCNC: 17 U/L (ref 10–44)
ANION GAP SERPL CALC-SCNC: 7 MMOL/L (ref 8–16)
AST SERPL-CCNC: 12 U/L (ref 10–40)
BASOPHILS # BLD AUTO: 0.04 K/UL (ref 0–0.2)
BASOPHILS NFR BLD: 0.4 % (ref 0–1.9)
BILIRUB SERPL-MCNC: 0.4 MG/DL (ref 0.1–1)
BUN SERPL-MCNC: 15 MG/DL (ref 6–20)
CALCIUM SERPL-MCNC: 9.7 MG/DL (ref 8.7–10.5)
CHLORIDE SERPL-SCNC: 104 MMOL/L (ref 95–110)
CO2 SERPL-SCNC: 28 MMOL/L (ref 23–29)
CREAT SERPL-MCNC: 0.9 MG/DL (ref 0.5–1.4)
DIFFERENTIAL METHOD: ABNORMAL
EOSINOPHIL # BLD AUTO: 0.1 K/UL (ref 0–0.5)
EOSINOPHIL NFR BLD: 1.2 % (ref 0–8)
ERYTHROCYTE [DISTWIDTH] IN BLOOD BY AUTOMATED COUNT: 15.5 % (ref 11.5–14.5)
EST. GFR  (AFRICAN AMERICAN): >60 ML/MIN/1.73 M^2
EST. GFR  (NON AFRICAN AMERICAN): >60 ML/MIN/1.73 M^2
GLUCOSE SERPL-MCNC: 85 MG/DL (ref 70–110)
HCT VFR BLD AUTO: 40.9 % (ref 37–48.5)
HGB BLD-MCNC: 12 G/DL (ref 12–16)
IMM GRANULOCYTES # BLD AUTO: 0.04 K/UL (ref 0–0.04)
IMM GRANULOCYTES NFR BLD AUTO: 0.4 % (ref 0–0.5)
LYMPHOCYTES # BLD AUTO: 2.1 K/UL (ref 1–4.8)
LYMPHOCYTES NFR BLD: 20.7 % (ref 18–48)
MAGNESIUM SERPL-MCNC: 1.9 MG/DL (ref 1.6–2.6)
MCH RBC QN AUTO: 23.6 PG (ref 27–31)
MCHC RBC AUTO-ENTMCNC: 29.3 G/DL (ref 32–36)
MCV RBC AUTO: 81 FL (ref 82–98)
MONOCYTES # BLD AUTO: 0.7 K/UL (ref 0.3–1)
MONOCYTES NFR BLD: 6.5 % (ref 4–15)
NEUTROPHILS # BLD AUTO: 7.2 K/UL (ref 1.8–7.7)
NEUTROPHILS NFR BLD: 70.8 % (ref 38–73)
NRBC BLD-RTO: 0 /100 WBC
PLATELET # BLD AUTO: 61 K/UL (ref 150–350)
PMV BLD AUTO: 12.3 FL (ref 9.2–12.9)
POTASSIUM SERPL-SCNC: 4.5 MMOL/L (ref 3.5–5.1)
PROT SERPL-MCNC: 7.2 G/DL (ref 6–8.4)
RBC # BLD AUTO: 5.08 M/UL (ref 4–5.4)
SODIUM SERPL-SCNC: 139 MMOL/L (ref 136–145)
WBC # BLD AUTO: 10.18 K/UL (ref 3.9–12.7)

## 2019-07-08 PROCEDURE — 85025 COMPLETE CBC W/AUTO DIFF WBC: CPT

## 2019-07-08 PROCEDURE — 36415 COLL VENOUS BLD VENIPUNCTURE: CPT

## 2019-07-08 PROCEDURE — 80053 COMPREHEN METABOLIC PANEL: CPT

## 2019-07-08 PROCEDURE — 83735 ASSAY OF MAGNESIUM: CPT

## 2019-07-08 PROCEDURE — 80197 ASSAY OF TACROLIMUS: CPT

## 2019-07-09 LAB — TACROLIMUS BLD-MCNC: 6.6 NG/ML (ref 5–15)

## 2019-07-10 ENCOUNTER — OFFICE VISIT (OUTPATIENT)
Dept: PAIN MEDICINE | Facility: CLINIC | Age: 54
End: 2019-07-10
Payer: MEDICAID

## 2019-07-10 VITALS
DIASTOLIC BLOOD PRESSURE: 74 MMHG | SYSTOLIC BLOOD PRESSURE: 139 MMHG | HEIGHT: 63 IN | WEIGHT: 201 LBS | BODY MASS INDEX: 35.61 KG/M2 | HEART RATE: 77 BPM | RESPIRATION RATE: 18 BRPM

## 2019-07-10 DIAGNOSIS — R29.898 WEAKNESS OF BOTH LOWER EXTREMITIES: ICD-10-CM

## 2019-07-10 DIAGNOSIS — M54.16 LUMBAR RADICULOPATHY: ICD-10-CM

## 2019-07-10 DIAGNOSIS — M51.36 DEGENERATIVE DISC DISEASE, LUMBAR: Primary | ICD-10-CM

## 2019-07-10 DIAGNOSIS — M53.3 PAIN OF RIGHT SACROILIAC JOINT: ICD-10-CM

## 2019-07-10 DIAGNOSIS — G57.01 PIRIFORMIS SYNDROME, RIGHT: ICD-10-CM

## 2019-07-10 PROCEDURE — 99999 PR PBB SHADOW E&M-EST. PATIENT-LVL IV: ICD-10-PCS | Mod: PBBFAC,,, | Performed by: PHYSICIAN ASSISTANT

## 2019-07-10 PROCEDURE — 99214 OFFICE O/P EST MOD 30 MIN: CPT | Mod: S$PBB,,, | Performed by: PHYSICIAN ASSISTANT

## 2019-07-10 PROCEDURE — 99214 PR OFFICE/OUTPT VISIT, EST, LEVL IV, 30-39 MIN: ICD-10-PCS | Mod: S$PBB,,, | Performed by: PHYSICIAN ASSISTANT

## 2019-07-10 PROCEDURE — 99999 PR PBB SHADOW E&M-EST. PATIENT-LVL IV: CPT | Mod: PBBFAC,,, | Performed by: PHYSICIAN ASSISTANT

## 2019-07-10 PROCEDURE — 99214 OFFICE O/P EST MOD 30 MIN: CPT | Mod: PBBFAC | Performed by: PHYSICIAN ASSISTANT
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
## 2019-07-12 ENCOUNTER — TELEPHONE (OUTPATIENT)
Dept: TRANSPLANT | Facility: CLINIC | Age: 54
End: 2019-07-12

## 2019-07-12 NOTE — TELEPHONE ENCOUNTER
Dr. Souza reviewed your labs.  Sent patient message to let her know labs are stable no changes.  Next labs are due on 08/19/19

## 2019-07-12 NOTE — TELEPHONE ENCOUNTER
----- Message from Joselin Souza MD sent at 7/11/2019  5:34 PM CDT -----  Reviewed, nothing to do; repeat per routine

## 2019-07-16 ENCOUNTER — CLINICAL SUPPORT (OUTPATIENT)
Dept: REHABILITATION | Facility: HOSPITAL | Age: 54
End: 2019-07-16
Payer: MEDICAID

## 2019-07-16 DIAGNOSIS — M62.81 MUSCLE WEAKNESS (GENERALIZED): ICD-10-CM

## 2019-07-16 DIAGNOSIS — M54.41 CHRONIC BILATERAL LOW BACK PAIN WITH RIGHT-SIDED SCIATICA: ICD-10-CM

## 2019-07-16 DIAGNOSIS — R26.2 DIFFICULTY WALKING: ICD-10-CM

## 2019-07-16 DIAGNOSIS — G89.29 CHRONIC BILATERAL LOW BACK PAIN WITH RIGHT-SIDED SCIATICA: ICD-10-CM

## 2019-07-16 PROCEDURE — 97140 MANUAL THERAPY 1/> REGIONS: CPT

## 2019-07-16 PROCEDURE — 97110 THERAPEUTIC EXERCISES: CPT

## 2019-07-16 NOTE — PROGRESS NOTES
"  Physical Therapy Daily Treatment Note     Name: Marcie Bazan  Clinic Number: [id number]    Therapy Diagnosis:   Encounter Diagnoses   Name Primary?    Chronic bilateral low back pain with right-sided sciatica     Difficulty walking     Muscle weakness (generalized)      Physician: Antonio Horan, *    Visit Date: 7/16/2019    Physician Orders: PT EVAL and TREAT  Medical Diagnosis: bilateral greater trochanteric bursitis, OA lumbar spine, SI pain  Evaluation Date: 3/26/2019  Authorization Period Expiration:9/19/2019  Plan of Care Certification Period: 5/26/2019  Visit #/Visits authorized: 14/ 20     Time In: 8:20  Time Out: 9:30  Total Billable Time: 61 minutes    Precautions: Standard, Immunosuppression and organ transplant    Subjective     Pt reports: she has been feeling really good.  She reports 3/10 back pain and right leg pain.  She continues to have difficulty with stairs and would like to work on it.  She reports being able to lift her left leg into the car at times and other times she has to use her arms to get her leg into the car.      She was compliant with home exercise program.  Response to previous treatment: Patient has less pain.  Functional change: Patient is able to get her left leg into car at times.      Pain: 3/10  Location: right leg and low back    Objective     Marcie received therapeutic exercises to develop strength, endurance, ROM, flexibility, posture and core stabilization for 31 minutes  including:   Walking on Treadmill: 0 incline, 0.9 mph, 9 minutes (for endurance, LE strength and erect posture)  Forward Step Ups: 2" step, 2 minutes each (4 minutes total)  Lateral Step Ups: 2" step, 2 minutes each (4 minutes total)  Standing Gastroc Stretch: 3 minutes  Standing Soleus Stretch: 3 minutes  Hamstring Stretch: 2 minutes each (4 minutes total)  IT Band Stretch: 2 minutes each (4 minutes total)     Marcie received the following manual therapy techniques: Manual traction, " Myofacial release and Soft tissue Mobilization were applied to the: lumbar spine, bilateral LEs for 30 minutes, including:  Lumbar Distraction with Sheet, bilateral long leg distraction, STM/MFR to bilateral quad, hip flexor,  IT band and hamstring.        Home Exercises Provided and Patient Education Provided      Education provided:   - Patient educated on importance of using core activation with all activities to help reduce back pain.    Written Home Exercises Provided: Patient instructed to cont prior HEP.  Exercises were reviewed and Marcie was able to demonstrate them prior to the end of the session.  Marcie demonstrated good  understanding of the education provided.     See EMR under Patient Instructions for exercises provided prior visit.    Assessment     Patient tolerated forward and lateral step ups without difficulty.  Patient tolerated all other exercises well.  Patient with increased tenderness on bilateral IT band.  Patient with decreased symptoms on right LE with right long leg distraction.    Marcie is progressing well towards her goals.   Pt prognosis is Good.     Pt will continue to benefit from skilled outpatient physical therapy to address the deficits listed in the problem list box on initial evaluation, provide pt/family education and to maximize pt's level of independence in the home and community environment.     Pt's spiritual, cultural and educational needs considered and pt agreeable to plan of care and goals.     Anticipated barriers to physical therapy: poor body awareness, coping style, poor carry over.      Plan     Continue therapy for 2 times per week.  Progress core strengthening to standing activities as patient tolerates.  Continue to progress step height as patient tolerates.    Laura Patrick, PT, DPT

## 2019-07-21 DIAGNOSIS — M25.612 DECREASED SHOULDER MOBILITY, LEFT: ICD-10-CM

## 2019-07-21 DIAGNOSIS — G62.9 NEUROPATHY: ICD-10-CM

## 2019-07-23 ENCOUNTER — OFFICE VISIT (OUTPATIENT)
Dept: ORTHOPEDICS | Facility: CLINIC | Age: 54
End: 2019-07-23
Payer: MEDICAID

## 2019-07-23 VITALS
WEIGHT: 201 LBS | SYSTOLIC BLOOD PRESSURE: 144 MMHG | HEIGHT: 63 IN | DIASTOLIC BLOOD PRESSURE: 92 MMHG | BODY MASS INDEX: 35.61 KG/M2 | HEART RATE: 75 BPM

## 2019-07-23 DIAGNOSIS — M70.61 GREATER TROCHANTERIC BURSITIS OF BOTH HIPS: Primary | ICD-10-CM

## 2019-07-23 DIAGNOSIS — E78.2 MIXED HYPERLIPIDEMIA: ICD-10-CM

## 2019-07-23 DIAGNOSIS — M53.3 SI (SACROILIAC) PAIN: ICD-10-CM

## 2019-07-23 DIAGNOSIS — Z29.89 PROPHYLACTIC IMMUNOTHERAPY: ICD-10-CM

## 2019-07-23 DIAGNOSIS — M70.61 GREATER TROCHANTERIC BURSITIS OF RIGHT HIP: ICD-10-CM

## 2019-07-23 DIAGNOSIS — R60.0 BILATERAL LEG EDEMA: ICD-10-CM

## 2019-07-23 DIAGNOSIS — M47.26 OSTEOARTHRITIS OF SPINE WITH RADICULOPATHY, LUMBAR REGION: ICD-10-CM

## 2019-07-23 DIAGNOSIS — M70.62 GREATER TROCHANTERIC BURSITIS OF LEFT HIP: ICD-10-CM

## 2019-07-23 DIAGNOSIS — Z79.60 LONG-TERM USE OF IMMUNOSUPPRESSANT MEDICATION: ICD-10-CM

## 2019-07-23 DIAGNOSIS — M70.62 GREATER TROCHANTERIC BURSITIS OF BOTH HIPS: Primary | ICD-10-CM

## 2019-07-23 DIAGNOSIS — I10 ESSENTIAL HYPERTENSION: ICD-10-CM

## 2019-07-23 DIAGNOSIS — Z94.4 LIVER REPLACED BY TRANSPLANT: ICD-10-CM

## 2019-07-23 PROCEDURE — 99999 PR PBB SHADOW E&M-EST. PATIENT-LVL III: ICD-10-PCS | Mod: PBBFAC,,, | Performed by: FAMILY MEDICINE

## 2019-07-23 PROCEDURE — 20610 LARGE JOINT ASPIRATION/INJECTION: R GREATER TROCHANTERIC BURSA: ICD-10-PCS | Mod: 50,S$PBB,, | Performed by: FAMILY MEDICINE

## 2019-07-23 PROCEDURE — 99214 OFFICE O/P EST MOD 30 MIN: CPT | Mod: 25,S$PBB,, | Performed by: FAMILY MEDICINE

## 2019-07-23 PROCEDURE — 99214 PR OFFICE/OUTPT VISIT, EST, LEVL IV, 30-39 MIN: ICD-10-PCS | Mod: 25,S$PBB,, | Performed by: FAMILY MEDICINE

## 2019-07-23 PROCEDURE — 20610 DRAIN/INJ JOINT/BURSA W/O US: CPT | Mod: PBBFAC | Performed by: FAMILY MEDICINE

## 2019-07-23 PROCEDURE — 99213 OFFICE O/P EST LOW 20 MIN: CPT | Mod: PBBFAC,25 | Performed by: FAMILY MEDICINE

## 2019-07-23 PROCEDURE — 99999 PR PBB SHADOW E&M-EST. PATIENT-LVL III: CPT | Mod: PBBFAC,,, | Performed by: FAMILY MEDICINE

## 2019-07-23 PROCEDURE — 20610 DRAIN/INJ JOINT/BURSA W/O US: CPT | Mod: PBBFAC,50 | Performed by: FAMILY MEDICINE

## 2019-07-23 RX ORDER — TRIAMCINOLONE ACETONIDE 40 MG/ML
40 INJECTION, SUSPENSION INTRA-ARTICULAR; INTRAMUSCULAR
Status: DISCONTINUED | OUTPATIENT
Start: 2019-07-23 | End: 2019-07-23 | Stop reason: HOSPADM

## 2019-07-23 RX ORDER — CYCLOBENZAPRINE HCL 10 MG
TABLET ORAL
Qty: 30 TABLET | Refills: 0 | Status: SHIPPED | OUTPATIENT
Start: 2019-07-23 | End: 2019-08-19 | Stop reason: SDUPTHER

## 2019-07-23 RX ADMIN — TRIAMCINOLONE ACETONIDE 40 MG: 40 INJECTION, SUSPENSION INTRA-ARTICULAR; INTRAMUSCULAR at 08:07

## 2019-07-23 NOTE — PROCEDURES
Large Joint Aspiration/Injection: R greater trochanteric bursa  Date/Time: 7/23/2019 8:31 AM  Performed by: Alden Bee MD  Authorized by: Alden Bee MD     Consent Done?:  Yes (Verbal)  Indications:  Pain and diagnostic evaluation  Procedure site marked: Yes    Timeout: Prior to procedure the correct patient, procedure, and site was verified      Location:  Hip  Site:  R greater trochanteric bursa  Prep: Patient was prepped and draped in usual sterile fashion    Ultrasonic Guidance for needle placement: No  Needle size:  27 G  Approach:  Lateral  Medications:  40 mg triamcinolone acetonide 40 mg/mL  Patient tolerance:  Patient tolerated the procedure well with no immediate complications    Additional Comments: Verbal consent was obtained prior to the procedure.  Explained to the patient that there is a small risk of persistent pain, bleeding, and infection.  Discussed with the patient the steps of the procedure, including but not limited to, needle size and length, location of the injection, sterile technique using ChloraPrep and alcohol swabs, local anesthesia, risks/benefits, side effects, and alternatives including not performing the procedure.  Patient expressed understanding and verbally consented to the procedure after all pertinent questions related to the procedure were answered and explained before performing the procedure.    Patient experienced minimal blood loss (< 3 cc) and clean bandage was applied. Post procedure care was provided to the patient verbally and with their AVS. Patient was instructed to take it easy for the next 24-48 hours and was informed that their pain may increase once the anaesthesia wears off and before the steroid begins to work. Patient was instructed to ice area for 15 minutes and may take Tylenol PRN if pain worsens. Patient was informed to contact clinic or go to the ED if they develop any fever, chills, redness, swelling, or other complications.
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
## 2019-07-23 NOTE — PROCEDURES
Large Joint Aspiration/Injection: L greater trochanteric bursa  Date/Time: 7/23/2019 8:31 AM  Performed by: Alden Bee MD  Authorized by: Alden Bee MD     Consent Done?:  Yes (Verbal)  Indications:  Pain and diagnostic evaluation  Procedure site marked: Yes    Timeout: Prior to procedure the correct patient, procedure, and site was verified      Location:  Hip  Site:  L greater trochanteric bursa  Prep: Patient was prepped and draped in usual sterile fashion    Ultrasonic Guidance for needle placement: No  Needle size:  27 G  Approach:  Lateral  Medications:  40 mg triamcinolone acetonide 40 mg/mL  Patient tolerance:  Patient tolerated the procedure well with no immediate complications    Additional Comments: Verbal consent was obtained prior to the procedure.  Explained to the patient that there is a small risk of persistent pain, bleeding, and infection.  Discussed with the patient the steps of the procedure, including but not limited to, needle size and length, location of the injection, sterile technique using ChloraPrep and alcohol swabs, local anesthesia, risks/benefits, side effects, and alternatives including not performing the procedure.  Patient expressed understanding and verbally consented to the procedure after all pertinent questions related to the procedure were answered and explained before performing the procedure.    Patient experienced minimal blood loss (< 3 cc) and clean bandage was applied. Post procedure care was provided to the patient verbally and with their AVS. Patient was instructed to take it easy for the next 24-48 hours and was informed that their pain may increase once the anaesthesia wears off and before the steroid begins to work. Patient was instructed to ice area for 15 minutes and may take Tylenol PRN if pain worsens. Patient was informed to contact clinic or go to the ED if they develop any fever, chills, redness, swelling, or other complications.

## 2019-07-24 RX ORDER — FUROSEMIDE 40 MG/1
TABLET ORAL
Qty: 30 TABLET | Refills: 0 | Status: SHIPPED | OUTPATIENT
Start: 2019-07-24 | End: 2019-08-19 | Stop reason: SDUPTHER

## 2019-07-24 RX ORDER — GABAPENTIN 300 MG/1
CAPSULE ORAL
Qty: 120 CAPSULE | Refills: 2 | Status: SHIPPED | OUTPATIENT
Start: 2019-07-24 | End: 2019-11-05 | Stop reason: SDUPTHER

## 2019-08-05 ENCOUNTER — CLINICAL SUPPORT (OUTPATIENT)
Dept: REHABILITATION | Facility: HOSPITAL | Age: 54
End: 2019-08-05
Payer: MEDICAID

## 2019-08-05 DIAGNOSIS — R26.2 DIFFICULTY WALKING: ICD-10-CM

## 2019-08-05 DIAGNOSIS — M54.41 CHRONIC BILATERAL LOW BACK PAIN WITH RIGHT-SIDED SCIATICA: ICD-10-CM

## 2019-08-05 DIAGNOSIS — G89.29 CHRONIC BILATERAL LOW BACK PAIN WITH RIGHT-SIDED SCIATICA: ICD-10-CM

## 2019-08-05 DIAGNOSIS — M62.81 MUSCLE WEAKNESS (GENERALIZED): ICD-10-CM

## 2019-08-05 PROCEDURE — 97110 THERAPEUTIC EXERCISES: CPT

## 2019-08-05 PROCEDURE — 97140 MANUAL THERAPY 1/> REGIONS: CPT

## 2019-08-05 NOTE — PLAN OF CARE
Outpatient Therapy Updated Plan of Care     Visit Date: 8/5/2019  Name: Marcie Bazan  Clinic Number: [id number]    Therapy Diagnosis:   Encounter Diagnoses   Name Primary?    Chronic bilateral low back pain with right-sided sciatica     Difficulty walking     Muscle weakness (generalized)      Physician: Antonio Horan, *    Physician Orders: PT EVAL and TREAT  Medical Diagnosis: Low back pain, bilateral greater trochanteric bursitis, SI pain  Evaluation Date: 3/26/2019    Total Visits Received: 15  Cancelled Visits: 17  No Show Visits: 2    Current Certification Period:  6/19/2019 to 8/2/2019  Precautions:  Standard  Visits from Evaluation Date:  15  Functional Level Prior to Evaluation:  Patient was mostly in bed and unable to perform any functions. At home.    Subjective     Update: Patient reports no pain today.  She states she had to rest a lot last week to recover from her steroid injection.  By the end of Tuesday, she was feeling better.    Objective     Update:     LUMBAR AROM:  Flexion: Fingers to ankles  Extension: 10 degrees  Right Side Bending: Hand to lateral thight  Left Side Bending: Hand to fibular head  Right Rotation: 75 degrees  Left Rotation: 70 degrees    HIP AROM:  Flexion: WFL bilateral    KNEE AROM:  Flexion: WFL bilateral  Extension:  WFL bilateral    ANKLE AROM:  Dorsiflexion: WFL bilateral    LE MMT:  Hip Flexion: right 3+/5, left 4/5  Knee Extension: right 4-/5, left 4+/5  Knee Flexion: 5/5 bilateral  Ankle Dorsiflexion: 5/5 bilateral      Assessment     Update: Patient demonstrates improved lumbar AROM and improved bilateral LE strength. Patient continues to have difficulty with right LE and shooting pain with activity.  Patient with decreased symptoms after manual therapy.    Previous Short Term Goals Status: Short Term Goals:  (4 weeks)  1.  Patient will demonstrate 40% improved lumbar AROM. (GOAL MET 8/5/2019)  2.  Patient will demonstrate 40% improved hip AROM. (GOAL MET  8/5/2019)  3.  Patient will demonstrate 3/5 bilateral LE strength. (GOAL MET 8/5/2019)  4.  Patient will have 5/10 pain overall. (GOAL MET 8/2/2019)     Long Term Goals:  (8 weeks)  1.  Patient will demonstrate 80% improved lumbar AROM. (GOAL PROGRESSING)  2.  Patient will demonstrate 80% improved hip AROM. (GOAL PROGRESSING)  3.  Patient will demonstrate 4/5 bilateral LE strength. (GOAL PROGRESSING)  4.  Patient will have 2/10 pain overall. (GOAL PROGRESSING)      New Short Term Goals Status:   Continue working towards accomplishing all goals.  Long Term Goal Status:   continue per initial plan of care.  Reasons for Recertification of Therapy:   Patient is gaining strength and more AROM.  Patient had difficulty with consistent therapy secondary to increased pain and needing steroid injections.    Plan     Updated Certification Period: 8/5/2019 to 9/20/2019  Recommended Treatment Plan: 2 times per week for 6 weeks: Aquatic Therapy, Electrical Stimulation ,, Manual Therapy, Moist Heat/ Ice, Neuromuscular Re-ed, Patient Education, Self Care, Therapeutic Activites and Therapeutic Exercise  Other Recommendations: none    Laura Patrick, PT, DPT  8/5/2019      I CERTIFY THE NEED FOR THESE SERVICES FURNISHED UNDER THIS PLAN OF TREATMENT AND WHILE UNDER MY CARE    Physician's comments:        Physician's Signature: ___________________________________________________

## 2019-08-05 NOTE — PROGRESS NOTES
Physical Therapy Daily Treatment Note     Name: Marcie Bazan  Clinic Number: [id number]    Therapy Diagnosis:   Encounter Diagnoses   Name Primary?    Chronic bilateral low back pain with right-sided sciatica     Difficulty walking     Muscle weakness (generalized)      Physician: Antonio Horan, *    Visit Date: 8/5/2019    Physician Orders: PT EVAL and TREAT  Medical Diagnosis: bilateral greater trochanteric bursitis, OA lumbar spine, SI pain  Evaluation Date: 3/26/2019  Authorization Period Expiration:9/19/2019  Plan of Care Certification Period: 5/26/2019  Visit #/Visits authorized: 15/ 20     Time In: 8:55  Time Out: 10:02  Total Billable Time: 53 minutes    Precautions: Standard, Immunosuppression and organ transplant    Subjective     Pt reports: she has no pain today.  She felt better after Tuesday afternoon because she rested all day.  She states some of the items on the survey are harder today after recovering from the steroid shot for the last two weeks.    She was compliant with home exercise program.  Response to previous treatment: Patient felt stronger.  Functional change: Patient has less difficulty getting in and out of her car.    Pain: 0/10  Location: right leg and low back    Objective     LUMBAR AROM:  Flexion: Fingers to ankles  Extension: 10 degrees  Right Side Bending: Hand to lateral thight  Left Side Bending: Hand to fibular head  Right Rotation: 75 degrees  Left Rotation: 70 degrees    HIP AROM:  Flexion: WFL bilateral    KNEE AROM:  Flexion: WFL bilateral  Extension:  WFL bilateral    ANKLE AROM:  Dorsiflexion: WFL bilateral    LE MMT:  Hip Flexion: right 3+/5, left 4/5  Knee Extension: right 4-/5, left 4+/5  Knee Flexion: 5/5 bilateral  Ankle Dorsiflexion: 5/5 bilateral      MODIFIED OSWESTRY LOW BACK PAIN DISABILITY QUESTIONNAIRE - The following scores are patient-reported and range from 0-5, with 0 being least impaired and 5 being most impaired.    Section 1- Pain intensity     Score 2/5   Section 2- Person care  Score 3/5   Section 3 Lifting- Optional  Score 3/5     Section 4  Walking  Score 1/5  Section 5 Sitting   Score 2/5   Section 6 Standing  Score 1/5  Section 7 Sleeping  Score 1/5   Section 8 Social Life   Score 1/5  Section 9 Traveling  Score 2/5   Section 10 Employ/home  Score 2/5    Patient reports 36% disability on the Modified Oswestry Low Back Pain Disability Questionnaire.      LOWER EXTREMITY FUNCTIONAL SCALE    Patient-reported functional assessment scores are rated as follows:  A score of 0/4 represents extreme difficulty or unable to perform the activity. A score of 1/4 represents quite a bit of difficulty. A score of 2/4 represents moderate difficulty. A score of 3/4 represents a little bit of difficulty. A score of 4/4 represents no difficulty.                EVAL   1. Any of your usual work, housework or school activities   2/4  2. Your usual hobbies, sporting     1/4  3. Getting in and out of tub      3/4  4. Walking between rooms      4/4  5. Putting on shoes or socks      4/4  6. Squatting        0/4  7. Lifting an object from the ground      2/4  8. Performing light activities around the home   3/4  9. Performing heavy activities around the home   1/4  10. Getting in and out of car      3/4  11. Walking 2 blocks       1/4  12.Walking a mile       0/4  13. Getting up and down 1 flight of stairs    0/4  14. Standing for 1 hour      1/4  15. Sitting for an hour       2/4  16. Running on even ground      0/4  17. Running on uneven ground     0/4  18. Making sharp turns when running fast    0/4  19. Hopping        0/4  20. Rolling over in bed       4/4    Patient reports 38.75% ability based on score of the Lower Extremity Functional Scale.       Marcie received therapeutic exercises to develop strength, endurance, ROM, flexibility, posture and core stabilization for 28 minutes  including:   Walking on Treadmill: 0 incline, 0.9 mph, 7 minutes (for endurance, LE  strength and erect posture)  Seated Supine Hip Adduction: With Ball, 3 minutes  Seated Supine Hip Abduction: Red Theraband, 3 minutes  Abdominal Bracing: seated, 3 minutes  Seated Gluteal Sets: 3 minutes  Seated Abdominal Bracing with Marches: 3 minutes  Standing Incline Gastroc Stretch: 3 minutes  Standing Incline Soleus Stretch: 3 minutes      Marcie received the following manual therapy techniques: Manual traction, Myofacial release and Soft tissue Mobilization were applied to the: lumbar spine, bilateral LEs for 25 minutes, including:  Lumbar Distraction with Sheet, bilateral long leg distraction, STM/MFR to bilateral gluteus medius, gluteus minimus, IT band, piriformis and hamstring.        Home Exercises Provided and Patient Education Provided      Education provided:   - Patient educated on taking it slow and easy at home and building back up her endurance with core activation.  Patient also educated on keeping all activities pain free.  Written Home Exercises Provided: Patient instructed to cont prior HEP.  Exercises were reviewed and Marcie was able to demonstrate them prior to the end of the session.  Marcie demonstrated good  understanding of the education provided.     See EMR under Patient Instructions for exercises provided prior visit.    Assessment     Patient demonstrated some fatigue and shortness of breath with walking on the treadmill.  Patient was unable to complete 9 minutes today and required verbal cues to stop and rest secondary to poor posture and out of breath.  Patient required verbal cues to keep exercises pain free and to use core activation with all activities.  Patient with decreased symptoms after manual therapy.    Marcie is progressing well towards her goals.   Pt prognosis is Good.     Pt will continue to benefit from skilled outpatient physical therapy to address the deficits listed in the problem list box on initial evaluation, provide pt/family education and to maximize pt's level  of independence in the home and community environment.     Pt's spiritual, cultural and educational needs considered and pt agreeable to plan of care and goals.     Anticipated barriers to physical therapy: poor body awareness, coping style, poor carry over.      Plan     Continue therapy for 2 times per week.  Progress exercises next week as patient's endurance improves.      Laura Patrick, PT, DPT

## 2019-08-12 ENCOUNTER — CLINICAL SUPPORT (OUTPATIENT)
Dept: REHABILITATION | Facility: HOSPITAL | Age: 54
End: 2019-08-12
Payer: MEDICAID

## 2019-08-12 DIAGNOSIS — M54.41 CHRONIC BILATERAL LOW BACK PAIN WITH RIGHT-SIDED SCIATICA: ICD-10-CM

## 2019-08-12 DIAGNOSIS — G89.29 CHRONIC BILATERAL LOW BACK PAIN WITH RIGHT-SIDED SCIATICA: ICD-10-CM

## 2019-08-12 DIAGNOSIS — R26.2 DIFFICULTY WALKING: ICD-10-CM

## 2019-08-12 DIAGNOSIS — M62.81 MUSCLE WEAKNESS (GENERALIZED): ICD-10-CM

## 2019-08-12 PROCEDURE — 97140 MANUAL THERAPY 1/> REGIONS: CPT

## 2019-08-12 PROCEDURE — 97110 THERAPEUTIC EXERCISES: CPT

## 2019-08-12 NOTE — PROGRESS NOTES
"  Physical Therapy Daily Treatment Note     Name: Marcie Bazan  Clinic Number: [id number]    Therapy Diagnosis:   Encounter Diagnoses   Name Primary?    Chronic bilateral low back pain with right-sided sciatica     Difficulty walking     Muscle weakness (generalized)      Physician: Antonio Horan, *    Visit Date: 8/12/2019    Physician Orders: PT EVAL and TREAT  Medical Diagnosis: bilateral greater trochanteric bursitis, OA lumbar spine, SI pain  Evaluation Date: 3/26/2019  Authorization Period Expiration:9/19/2019  Plan of Care Certification Period: 5/26/2019  Visit #/Visits authorized: 16/ 20     Time In: 9:00  Time Out: 10:05  Total Billable Time: 60 minutes    Precautions: Standard, Immunosuppression and organ transplant    Subjective     Pt reports: she is feeling good.  She reports she just has a pressure in her right leg.  Her back is "doing okay."     She was compliant with home exercise program.  Response to previous treatment: Patient with less pain in right leg.  Functional change: Patient is able to move with less pain.    Pain: 0/10  Location: right leg and low back    Objective     Marcie received therapeutic exercises to develop strength, endurance, ROM, flexibility, posture and core stabilization for 35 minutes  including:   Walking on Treadmill: 0 incline, 0.9 mph, 7 minutes (for endurance, LE strength and erect posture)  Seated Supine Hip Adduction: With Ball, 3 minutes  Seated Supine Hip Abduction: Red Theraband, 3 minutes  Abdominal Bracing: seated, 3 minutes  Seated Gluteal Sets: 3 minutes  Seated Abdominal Bracing with Marches: 3 minutes  Standing Incline Gastroc Stretch: 3 minutes  Standing Incline Soleus Stretch: 3 minutes  IT band Stretch with Strap: 2 minutes each (4 minutes total)      Marcie received the following manual therapy techniques: Manual traction, Myofacial release and Soft tissue Mobilization were applied to the: lumbar spine, bilateral LEs for 25 minutes, " including:  Lumbar Distraction with Sheet, bilateral long leg distraction, bilateral LE PROM In flexion, abduction, ER/IR, STM/MFR to bilateral gluteus medius, gluteus minimus, IT band, piriformis and hamstring.        Home Exercises Provided and Patient Education Provided      Education provided:   - Patient educated on making all activities pain free and not overdoing her activity.    Written Home Exercises Provided: Patient instructed to cont prior HEP.  Exercises were reviewed and Marcie was able to demonstrate them prior to the end of the session.  Marcie demonstrated good  understanding of the education provided.     See EMR under Patient Instructions for exercises provided prior visit.    Assessment     Patient with some fatigue and shortness of breath after walking on treadmill today.  Patient with decreased symptoms after manual therapy. Patient tolerated exercises well but required to keep all exercises pain free.  She also required verbal cues to have erect posture and activate her core.     Marcie is progressing well towards her goals.   Pt prognosis is Good.     Pt will continue to benefit from skilled outpatient physical therapy to address the deficits listed in the problem list box on initial evaluation, provide pt/family education and to maximize pt's level of independence in the home and community environment.     Pt's spiritual, cultural and educational needs considered and pt agreeable to plan of care and goals.     Anticipated barriers to physical therapy: poor body awareness, coping style, poor carry over.      Plan     Continue therapy for 2 times per week.  Progress exercises next week as patient's endurance improves.      Laura Patrick, PT, DPT

## 2019-08-19 ENCOUNTER — CLINICAL SUPPORT (OUTPATIENT)
Dept: REHABILITATION | Facility: HOSPITAL | Age: 54
End: 2019-08-19
Attending: NURSE PRACTITIONER
Payer: MEDICAID

## 2019-08-19 DIAGNOSIS — R26.2 DIFFICULTY WALKING: ICD-10-CM

## 2019-08-19 DIAGNOSIS — M54.41 CHRONIC BILATERAL LOW BACK PAIN WITH RIGHT-SIDED SCIATICA: ICD-10-CM

## 2019-08-19 DIAGNOSIS — Z29.89 PROPHYLACTIC IMMUNOTHERAPY: ICD-10-CM

## 2019-08-19 DIAGNOSIS — G89.29 CHRONIC BILATERAL LOW BACK PAIN WITH RIGHT-SIDED SCIATICA: ICD-10-CM

## 2019-08-19 DIAGNOSIS — R60.0 BILATERAL LEG EDEMA: ICD-10-CM

## 2019-08-19 DIAGNOSIS — M62.81 MUSCLE WEAKNESS (GENERALIZED): ICD-10-CM

## 2019-08-19 PROCEDURE — 97140 MANUAL THERAPY 1/> REGIONS: CPT

## 2019-08-19 NOTE — PROGRESS NOTES
Physical Therapy Daily Treatment Note     Name: Marcie Bazan  Clinic Number: [id number]    Therapy Diagnosis:   Encounter Diagnoses   Name Primary?    Chronic bilateral low back pain with right-sided sciatica     Difficulty walking     Muscle weakness (generalized)      Physician: Antonio Horan, *    Visit Date: 8/19/2019    Physician Orders: PT EVAL and TREAT  Medical Diagnosis: bilateral greater trochanteric bursitis, OA lumbar spine, SI pain  Evaluation Date: 3/26/2019  Authorization Period Expiration:9/19/2019  Plan of Care Certification Period: 5/26/2019  Visit #/Visits authorized: 17/ 20     Time In: 9:05  Time Out: 9:45  Total Billable Time: 40 minutes    Precautions: Standard, Immunosuppression and organ transplant    Subjective     Pt reports: her right leg is really bothering her since last week.  She reports she has been dragging the right leg since Friday.  It has been hurting so bad.  She currently has 7-8/10 pain in right leg and it is progressing to 9/10 with exercise.  She has a gynecological surgery coming up.    She was compliant with home exercise program.  Response to previous treatment: Patient with increased right leg pain.  Functional change: Patient has been unable to perform activities around her home.      Pain: 9/10  Location: right leg and low back    Objective     Marcie received therapeutic exercises to develop strength, endurance, ROM, flexibility, posture and core stabilization for 3 minutes  including:   Walking on Treadmill: 0 incline, 0.9 mph, 3 minutes (for endurance, LE strength and erect posture)    Marcie received the following manual therapy techniques: Manual traction, Myofacial release and Soft tissue Mobilization were applied to the: lumbar spine, bilateral LEs for 37 minutes, including:  Lumbar Distraction with Sheet, right long leg distraction, STM/MFR to bilateral gluteus medius, gluteus minimus, IT band, piriformis and hamstring.      Home Exercises Provided  and Patient Education Provided      Education provided:   - Patient educated on keeping all activities pain free as well as using core activation with all activites.    Written Home Exercises Provided: Patient instructed to cont prior HEP.  Exercises were reviewed and Marcie was able to demonstrate them prior to the end of the session.  Marcie demonstrated good  understanding of the education provided.     See EMR under Patient Instructions for exercises provided prior visit.    Assessment     Patient unable to tolerate therapeutic exercise today secondary to right LE pain.  Patient with severe pain and fatigue while on treadmill walking today.  Patient with increased tenderness noted in bilateral IT band and hamstring.  Patient with some decrease symptoms after manual therapy.  Patient disclosed at the end of therapy session that her gynecological surgery is this Friday and not next Friday.      See Discharge Summary Below.    Plan     See Discharge Summary Yue.    Laura Patrick, PT, DPT      Outpatient Therapy Discharge Summary     Name: Marcie Bazan  Clinic Number: [id number]    Therapy Diagnosis:   Encounter Diagnoses   Name Primary?    Chronic bilateral low back pain with right-sided sciatica     Difficulty walking     Muscle weakness (generalized)      Physician: Antonio Horan, *    Physician Orders: PT EVAL and TREAT  Medical Diagnosis: bilateral greater trochanteric bursitis, OA of lumbar spine, SI pain  Evaluation Date: 3/26/2019      Date of Last visit: 8/19/2019  Total Visits Received: 17  Cancelled Visits: 17  No Show Visits: 3    Assessment    Goals: Short Term Goals:  (4 weeks)  1.  Patient will demonstrate 40% improved lumbar AROM. (GOAL PROGRESSING)  2.  Patient will demonstrate 40% improved hip AROM. (GOAL PROGRESSING)  3.  Patient will demonstrate 3/5 bilateral LE strength. (GOAL PROGRESSING)  4.  Patient will have 5/10 pain overall. (GOAL PROGRESSING)     Long Term Goals:  (8  weeks)  1.  Patient will demonstrate 80% improved lumbar AROM. (GOAL PROGRESSING)  2.  Patient will demonstrate 80% improved hip AROM. (GOAL PROGRESSING)  3.  Patient will demonstrate 4/5 bilateral LE strength. (GOAL PROGRESSING)  4.  Patient will have 2/10 pain overall. (GOAL PROGRESSING)      Discharge reason: Other:  Patient is having surgery this week.    Plan   This patient is discharged from Physical Therapy.    Laura Patrick PT, DPT

## 2019-08-20 RX ORDER — CYCLOBENZAPRINE HCL 10 MG
TABLET ORAL
Qty: 30 TABLET | Refills: 0 | Status: SHIPPED | OUTPATIENT
Start: 2019-08-20 | End: 2020-02-04 | Stop reason: SDUPTHER

## 2019-08-21 RX ORDER — FUROSEMIDE 40 MG/1
TABLET ORAL
Qty: 30 TABLET | Refills: 2 | Status: SHIPPED | OUTPATIENT
Start: 2019-08-21 | End: 2019-09-04 | Stop reason: SDUPTHER

## 2019-08-22 ENCOUNTER — LAB VISIT (OUTPATIENT)
Dept: LAB | Facility: HOSPITAL | Age: 54
End: 2019-08-22
Attending: INTERNAL MEDICINE
Payer: MEDICAID

## 2019-08-22 DIAGNOSIS — Z94.4 LIVER REPLACED BY TRANSPLANT: ICD-10-CM

## 2019-08-22 LAB
ALBUMIN SERPL BCP-MCNC: 3.2 G/DL (ref 3.5–5.2)
ALP SERPL-CCNC: 130 U/L (ref 55–135)
ALT SERPL W/O P-5'-P-CCNC: 9 U/L (ref 10–44)
ANION GAP SERPL CALC-SCNC: 9 MMOL/L (ref 8–16)
AST SERPL-CCNC: 10 U/L (ref 10–40)
BASOPHILS # BLD AUTO: 0.06 K/UL (ref 0–0.2)
BASOPHILS NFR BLD: 0.6 % (ref 0–1.9)
BILIRUB SERPL-MCNC: 0.2 MG/DL (ref 0.1–1)
BUN SERPL-MCNC: 15 MG/DL (ref 6–20)
CALCIUM SERPL-MCNC: 9.6 MG/DL (ref 8.7–10.5)
CHLORIDE SERPL-SCNC: 106 MMOL/L (ref 95–110)
CO2 SERPL-SCNC: 27 MMOL/L (ref 23–29)
CREAT SERPL-MCNC: 0.9 MG/DL (ref 0.5–1.4)
DIFFERENTIAL METHOD: ABNORMAL
EOSINOPHIL # BLD AUTO: 0.1 K/UL (ref 0–0.5)
EOSINOPHIL NFR BLD: 0.8 % (ref 0–8)
ERYTHROCYTE [DISTWIDTH] IN BLOOD BY AUTOMATED COUNT: 15.9 % (ref 11.5–14.5)
EST. GFR  (AFRICAN AMERICAN): >60 ML/MIN/1.73 M^2
EST. GFR  (NON AFRICAN AMERICAN): >60 ML/MIN/1.73 M^2
GLUCOSE SERPL-MCNC: 75 MG/DL (ref 70–110)
HCT VFR BLD AUTO: 38.5 % (ref 37–48.5)
HGB BLD-MCNC: 11.7 G/DL (ref 12–16)
IMM GRANULOCYTES # BLD AUTO: 0.04 K/UL (ref 0–0.04)
IMM GRANULOCYTES NFR BLD AUTO: 0.4 % (ref 0–0.5)
LYMPHOCYTES # BLD AUTO: 2.1 K/UL (ref 1–4.8)
LYMPHOCYTES NFR BLD: 20 % (ref 18–48)
MAGNESIUM SERPL-MCNC: 1.8 MG/DL (ref 1.6–2.6)
MCH RBC QN AUTO: 24.1 PG (ref 27–31)
MCHC RBC AUTO-ENTMCNC: 30.4 G/DL (ref 32–36)
MCV RBC AUTO: 79 FL (ref 82–98)
MONOCYTES # BLD AUTO: 0.6 K/UL (ref 0.3–1)
MONOCYTES NFR BLD: 5.2 % (ref 4–15)
NEUTROPHILS # BLD AUTO: 7.8 K/UL (ref 1.8–7.7)
NEUTROPHILS NFR BLD: 73 % (ref 38–73)
NRBC BLD-RTO: 0 /100 WBC
PLATELET # BLD AUTO: 65 K/UL (ref 150–350)
PMV BLD AUTO: 10.7 FL (ref 9.2–12.9)
POTASSIUM SERPL-SCNC: 3.9 MMOL/L (ref 3.5–5.1)
PROT SERPL-MCNC: 6.9 G/DL (ref 6–8.4)
RBC # BLD AUTO: 4.86 M/UL (ref 4–5.4)
SODIUM SERPL-SCNC: 142 MMOL/L (ref 136–145)
WBC # BLD AUTO: 10.63 K/UL (ref 3.9–12.7)

## 2019-08-22 PROCEDURE — 85025 COMPLETE CBC W/AUTO DIFF WBC: CPT

## 2019-08-22 PROCEDURE — 83735 ASSAY OF MAGNESIUM: CPT

## 2019-08-22 PROCEDURE — 80197 ASSAY OF TACROLIMUS: CPT

## 2019-08-22 PROCEDURE — 36415 COLL VENOUS BLD VENIPUNCTURE: CPT

## 2019-08-22 PROCEDURE — 80053 COMPREHEN METABOLIC PANEL: CPT

## 2019-08-23 LAB — TACROLIMUS BLD-MCNC: 7.7 NG/ML (ref 5–15)

## 2019-08-28 ENCOUNTER — TELEPHONE (OUTPATIENT)
Dept: TRANSPLANT | Facility: CLINIC | Age: 54
End: 2019-08-28

## 2019-08-28 NOTE — TELEPHONE ENCOUNTER
----- Message from Joselin Souza MD sent at 8/26/2019 12:42 PM CDT -----  Reviewed, nothing to do; repeat per routine

## 2019-08-28 NOTE — TELEPHONE ENCOUNTER
----- Message from Joselin Souza MD sent at 8/23/2019  8:02 AM CDT -----  Labs ok awaiting immunosuppression level

## 2019-08-28 NOTE — LETTER
August 28, 2019    Marcie Bazan  [address] [facility] [address]          Dear Marcie Bazan:  MRN: [id number]    This is a follow up to your recent labs, your lab results were stable.  There are no medicine changes.  Please have your labs drawn again on 9/30/19.      If you cannot have your labs drawn on the scheduled date, it is your responsibility to call the transplant department to reschedule.  To reschedule or make an appointment, please as to speak to or leave a message for my assistant, Yaquelin Denton or Mica, at [phone].  When leaving a message for Yaquelin Denton Angela or myself, we ask that you leave a brief message regarding your request.    Sincerely,      Mica Doyle, RN, BSN, [facility]  Liver Transplant Coordinator    [facility]  [address] [place] [id number]  [phone]

## 2019-09-02 DIAGNOSIS — E55.9 VITAMIN D DEFICIENCY: ICD-10-CM

## 2019-09-02 RX ORDER — ERGOCALCIFEROL 1.25 MG/1
CAPSULE ORAL
Qty: 10 CAPSULE | Refills: 0 | Status: SHIPPED | OUTPATIENT
Start: 2019-09-02 | End: 2019-12-02 | Stop reason: SDUPTHER

## 2019-09-04 DIAGNOSIS — Z29.89 PROPHYLACTIC IMMUNOTHERAPY: ICD-10-CM

## 2019-09-04 DIAGNOSIS — R60.0 BILATERAL LEG EDEMA: ICD-10-CM

## 2019-09-04 RX ORDER — FAMOTIDINE 20 MG/1
20 TABLET, FILM COATED ORAL 2 TIMES DAILY
Qty: 60 TABLET | Refills: 5 | Status: SHIPPED | OUTPATIENT
Start: 2019-09-04 | End: 2020-04-08

## 2019-09-04 RX ORDER — FUROSEMIDE 40 MG/1
TABLET ORAL
Qty: 30 TABLET | Refills: 0 | Status: SHIPPED | OUTPATIENT
Start: 2019-09-04 | End: 2019-12-03 | Stop reason: SDUPTHER

## 2019-09-16 ENCOUNTER — OFFICE VISIT (OUTPATIENT)
Dept: GASTROENTEROLOGY | Facility: CLINIC | Age: 54
End: 2019-09-16
Payer: MEDICAID

## 2019-09-16 VITALS
DIASTOLIC BLOOD PRESSURE: 82 MMHG | WEIGHT: 205.5 LBS | BODY MASS INDEX: 36.41 KG/M2 | HEART RATE: 80 BPM | HEIGHT: 63 IN | SYSTOLIC BLOOD PRESSURE: 138 MMHG

## 2019-09-16 DIAGNOSIS — E66.9 OBESITY (BMI 35.0-39.9 WITHOUT COMORBIDITY): ICD-10-CM

## 2019-09-16 DIAGNOSIS — E83.42 HYPOMAGNESEMIA: ICD-10-CM

## 2019-09-16 DIAGNOSIS — D84.9 IMMUNOSUPPRESSION: Primary | ICD-10-CM

## 2019-09-16 DIAGNOSIS — Z94.4 LIVER TRANSPLANTED: ICD-10-CM

## 2019-09-16 PROCEDURE — 99213 OFFICE O/P EST LOW 20 MIN: CPT | Mod: S$PBB,,, | Performed by: INTERNAL MEDICINE

## 2019-09-16 PROCEDURE — 99999 PR PBB SHADOW E&M-EST. PATIENT-LVL III: ICD-10-PCS | Mod: PBBFAC,,, | Performed by: INTERNAL MEDICINE

## 2019-09-16 PROCEDURE — 99999 PR PBB SHADOW E&M-EST. PATIENT-LVL III: CPT | Mod: PBBFAC,,, | Performed by: INTERNAL MEDICINE

## 2019-09-16 PROCEDURE — 99213 OFFICE O/P EST LOW 20 MIN: CPT | Mod: PBBFAC | Performed by: INTERNAL MEDICINE

## 2019-09-16 PROCEDURE — 99213 PR OFFICE/OUTPT VISIT, EST, LEVL III, 20-29 MIN: ICD-10-PCS | Mod: S$PBB,,, | Performed by: INTERNAL MEDICINE

## 2019-09-16 RX ORDER — LANOLIN ALCOHOL/MO/W.PET/CERES
400 CREAM (GRAM) TOPICAL 2 TIMES DAILY
Qty: 120 TABLET | Refills: 5
Start: 2019-09-16 | End: 2019-10-17

## 2019-09-16 NOTE — PROGRESS NOTES
Transplant Hepatology  Liver Transplant Recipient Follow-up    Transplant Date: 2017  UNOS Native Liver Dx: Alcoholic Cirrhosis    Marcie is here for follow up of her liver transplant.    ORGAN: LIVER  Whole or Partial: whole liver  Donor Type:  - brain death  CDC High Risk: no  Donor CMV Status: Negative  Donor HCV Status: Negative  Donor HBcAb: Negative  Biliary Anastomosis: end to end  Arterial Anatomy: standard  IVC reconstruction: cavaplasty piggyback  Portal vein status: patent  .    Subjective:     Interval History:  Currently, she is doing adequately. Current complaints include continued issues with weight.  She has continued to gain weight.  She reports she tried a number of different diet issues.  Those did cause her to have some nausea and gain weight.  She has stopped that now.  She knowledge is that she eats a lot of sweets a usually trays 1 suite for the neck is.  Currently she is now focused on Icees.  Otherwise she still has some chronic issues with musculoskeletal problems and fatigue. No new problems.    Since last visit the patient reports she did have an abnormal Pap smear for which she required colposcopy.    Review of Systems    Objective:     Physical Exam   Constitutional: She is oriented to person, place, and time. She appears well-developed and well-nourished. No distress.   HENT:   Head: Normocephalic and atraumatic.   Mouth/Throat: Oropharynx is clear and moist. No oropharyngeal exudate.   Eyes: Pupils are equal, round, and reactive to light. Conjunctivae are normal. Right eye exhibits no discharge. Left eye exhibits no discharge. No scleral icterus.   Pulmonary/Chest: Effort normal and breath sounds normal. No respiratory distress. She has no wheezes.   Abdominal: Soft. She exhibits no distension. There is no tenderness.   Musculoskeletal: She exhibits no edema.   Neurological: She is alert and oriented to person, place, and time.   Psychiatric: She has a normal mood and  affect. Her behavior is normal.   Vitals reviewed.      WBC   Date Value Ref Range Status   08/22/2019 10.63 3.90 - 12.70 K/uL Final     Hemoglobin   Date Value Ref Range Status   08/22/2019 11.7 (L) 12.0 - 16.0 g/dL Final     POC Hematocrit   Date Value Ref Range Status   12/26/2017 28 (L) 36 - 54 %PCV Final     Hematocrit   Date Value Ref Range Status   08/22/2019 38.5 37.0 - 48.5 % Final     Platelets   Date Value Ref Range Status   08/22/2019 65 (L) 150 - 350 K/uL Final     BUN, Bld   Date Value Ref Range Status   08/22/2019 15 6 - 20 mg/dL Final     Creatinine   Date Value Ref Range Status   08/22/2019 0.9 0.5 - 1.4 mg/dL Final     Glucose   Date Value Ref Range Status   08/22/2019 75 70 - 110 mg/dL Final     Calcium   Date Value Ref Range Status   08/22/2019 9.6 8.7 - 10.5 mg/dL Final     Sodium   Date Value Ref Range Status   08/22/2019 142 136 - 145 mmol/L Final     Potassium   Date Value Ref Range Status   08/22/2019 3.9 3.5 - 5.1 mmol/L Final     Chloride   Date Value Ref Range Status   08/22/2019 106 95 - 110 mmol/L Final     Magnesium   Date Value Ref Range Status   08/22/2019 1.8 1.6 - 2.6 mg/dL Final     AST   Date Value Ref Range Status   08/22/2019 10 10 - 40 U/L Final     ALT   Date Value Ref Range Status   08/22/2019 9 (L) 10 - 44 U/L Final     Alkaline Phosphatase   Date Value Ref Range Status   08/22/2019 130 55 - 135 U/L Final     Total Bilirubin   Date Value Ref Range Status   08/22/2019 0.2 0.1 - 1.0 mg/dL Final     Comment:     For infants and newborns, interpretation of results should be based  on gestational age, weight and in agreement with clinical  observations.  Premature Infant recommended reference ranges:  Up to 24 hours.............<8.0 mg/dL  Up to 48 hours............<12.0 mg/dL  3-5 days..................<15.0 mg/dL  6-29 days.................<15.0 mg/dL       Albumin   Date Value Ref Range Status   08/22/2019 3.2 (L) 3.5 - 5.2 g/dL Final     INR   Date Value Ref Range Status    03/09/2018 1.0 0.8 - 1.2 Final     Comment:     Coumadin Therapy:  2.0 - 3.0 for INR for all indicators except mechanical heart valves  and antiphospholipid syndromes which should use 2.5 - 3.5.       Lab Results   Component Value Date    TACROLIMUS 7.7 08/22/2019           Assessment/Plan:     1. Immunosuppression    2. Liver transplanted    3. Hypomagnesemia    4. Obesity (BMI 35.0-39.9 without comorbidity)      Immunosuppression  -continue current immunosuppression    Liver transplanted-good allograft function  -continue with labs per routine, patient reports she has some do coming up soon.    Hypomagnesemia  -trying to decrease patient's pill burden.  Will decrease magnesium.  -need to check magnesium with upcoming labs which hopefully be within the next few weeks.  -     magnesium oxide (MAG-OX) 400 mg (241.3 mg magnesium) tablet; Take 1 tablet (400 mg total) by mouth 2 (two) times daily.  Dispense: 120 tablet; Refill: 5      Obesity-we discussed the importance of weight loss  -advised that she avoid sugary drinks and snacks  -limit eating after 6:00 p.m.    Return to clinic in 6 months    Patient was seen in the liver transplant department at [facility].        Joselin Souza MD           [facility] Patient Status  Functional Status: 80% - Normal activity with effort: some symptoms of disease  Physical Capacity: No Limitations

## 2019-09-17 ENCOUNTER — TELEPHONE (OUTPATIENT)
Dept: PAIN MEDICINE | Facility: CLINIC | Age: 54
End: 2019-09-17

## 2019-09-17 ENCOUNTER — PATIENT MESSAGE (OUTPATIENT)
Dept: PAIN MEDICINE | Facility: CLINIC | Age: 54
End: 2019-09-17

## 2019-09-17 NOTE — TELEPHONE ENCOUNTER
Called patient and scheduled her to see DEMETRIUS Blanc Thursday, September 19 for right hand pain. All questions answered.

## 2019-09-19 DIAGNOSIS — M79.642 PAIN OF LEFT HAND: Primary | ICD-10-CM

## 2019-09-23 ENCOUNTER — TELEPHONE (OUTPATIENT)
Dept: ORTHOPEDICS | Facility: CLINIC | Age: 54
End: 2019-09-23

## 2019-09-23 ENCOUNTER — HOSPITAL ENCOUNTER (OUTPATIENT)
Dept: RADIOLOGY | Facility: HOSPITAL | Age: 54
Discharge: HOME | End: 2019-09-23
Attending: PHYSICIAN ASSISTANT
Payer: MEDICAID

## 2019-09-23 ENCOUNTER — OFFICE VISIT (OUTPATIENT)
Dept: ORTHOPEDICS | Facility: CLINIC | Age: 54
End: 2019-09-23
Payer: MEDICAID

## 2019-09-23 VITALS
BODY MASS INDEX: 36.32 KG/M2 | DIASTOLIC BLOOD PRESSURE: 86 MMHG | HEIGHT: 63 IN | HEART RATE: 81 BPM | WEIGHT: 205 LBS | SYSTOLIC BLOOD PRESSURE: 142 MMHG

## 2019-09-23 DIAGNOSIS — Z94.4 LIVER REPLACED BY TRANSPLANT: ICD-10-CM

## 2019-09-23 DIAGNOSIS — M79.642 PAIN OF LEFT HAND: ICD-10-CM

## 2019-09-23 DIAGNOSIS — M79.642 LEFT HAND PAIN: Primary | ICD-10-CM

## 2019-09-23 DIAGNOSIS — Z86.19 HISTORY OF HELICOBACTER PYLORI INFECTION: ICD-10-CM

## 2019-09-23 PROCEDURE — 73130 X-RAY EXAM OF HAND: CPT | Mod: TC,LT

## 2019-09-23 PROCEDURE — 73130 X-RAY EXAM OF HAND: CPT | Mod: 26,LT,, | Performed by: RADIOLOGY

## 2019-09-23 PROCEDURE — 73130 XR HAND COMPLETE 3 VIEW LEFT: ICD-10-PCS | Mod: 26,LT,, | Performed by: RADIOLOGY

## 2019-09-23 PROCEDURE — 99999 PR PBB SHADOW E&M-EST. PATIENT-LVL IV: ICD-10-PCS | Mod: PBBFAC,,, | Performed by: PHYSICIAN ASSISTANT

## 2019-09-23 PROCEDURE — 99214 OFFICE O/P EST MOD 30 MIN: CPT | Mod: S$PBB,,, | Performed by: PHYSICIAN ASSISTANT

## 2019-09-23 PROCEDURE — 99999 PR PBB SHADOW E&M-EST. PATIENT-LVL IV: CPT | Mod: PBBFAC,,, | Performed by: PHYSICIAN ASSISTANT

## 2019-09-23 PROCEDURE — 99214 PR OFFICE/OUTPT VISIT, EST, LEVL IV, 30-39 MIN: ICD-10-PCS | Mod: S$PBB,,, | Performed by: PHYSICIAN ASSISTANT

## 2019-09-23 PROCEDURE — 99214 OFFICE O/P EST MOD 30 MIN: CPT | Mod: PBBFAC,25 | Performed by: PHYSICIAN ASSISTANT

## 2019-09-23 RX ORDER — METHOCARBAMOL 750 MG/1
TABLET, FILM COATED ORAL
Refills: 0 | COMMUNITY
Start: 2019-07-22 | End: 2020-01-24 | Stop reason: SDUPTHER

## 2019-09-23 RX ORDER — DICLOFENAC SODIUM 10 MG/G
2 GEL TOPICAL 3 TIMES DAILY PRN
Qty: 2 TUBE | Refills: 6 | Status: SHIPPED | OUTPATIENT
Start: 2019-09-23 | End: 2020-07-29

## 2019-09-23 NOTE — TELEPHONE ENCOUNTER
Patient was notified that Dr. Souza approved the use of a topical NSAID.  I have submitted the Voltaren gel to the [facility] mail order [facility], who will work on the authorization.  She verbalized understanding.

## 2019-09-23 NOTE — PROGRESS NOTES
Patient ID: Marcie Bazan is a 53 y.o. female.    Chief Complaint: Pain of the Left Hand      HPI: Marcie Bazan  is a 53 y.o. female who c/o Pain of the Left Hand   for duration of about 1 week.  She denies any inciting injury.  Pain level stated it 8/10.  Quality is aching, sharp, shooting.  It is intermittent.  Worsened with movement and contact.  She points to the 1st CMC joint is where pain is located.  Alleviating factors include rest.  She denies associated numbness and tingling.    Past Medical History:   Diagnosis Date    Alcohol abuse     Alcoholic hepatitis     Anemia of chronic disease     Coagulopathy     Encounter for blood transfusion     End stage liver disease     Hypertension     Hyponatremia     Liver cirrhosis     Liver transplant candidate 12/26/2017    Obesity (BMI 30-39.9) 1/5/2016     Past Surgical History:   Procedure Laterality Date    BREAST BIOPSY Left     benign    CHOLECYSTECTOMY      COLONOSCOPY N/A 12/5/2017    Performed by José [facility]avira MD at [facility] ENDO (2ND FLR)    COLONOSCOPY N/A 12/1/2017    Performed by Filemon Fuentes MD at [facility] ENDO (2ND FLR)    ESOPHAGOGASTRODUODENOSCOPY (EGD) N/A 12/1/2017    Performed by Filemon Fuentes MD at [facility] ENDO (2ND FLR)    HYSTERECTOMY      complete     LIVER TRANSPLANT  12/2017    Right piriformis injection Right 6/14/2019    [facility]formed [facility] at [facility]    right Sacroiliac Joint Injection Right 6/14/2019    [facility] at [facility] PAIN T    TRANSPLANT-LIVER N/A 12/26/2017    Performed by Romeo Moore MD at [facility] OR 2ND FLR     Family History   Problem Relation Age of Onset    Hypertension Mother     Alzheimer's disease Mother     Hypertension Father     Diabetes Father     Diabetes Sister     Depression Maternal Grandfather     Breast cancer Paternal Aunt      Social History     Socioeconomic History    Marital status: Single     Spouse name: Not on file    Number of  children: 2    Years of education: Not on file    Highest education level: Not on file   Occupational History    Not on file   Social Needs    Financial resource strain: Not on file    Food insecurity:     Worry: Not on file     Inability: Not on file    Transportation needs:     Medical: Not on file     Non-medical: Not on file   Tobacco Use    Smoking status: Never Smoker    Smokeless tobacco: Never Used   Substance and Sexual Activity    Alcohol use: No     Frequency: Never     Comment: Currently admitted to inpatient rehab 7/2017    Drug use: No    Sexual activity: Not Currently   Lifestyle    Physical activity:     Days per week: Not on file     Minutes per session: Not on file    Stress: Not on file   Relationships    Social connections:     Talks on phone: Not on file     Gets together: Not on file     Attends [employer] service: Not on file     Active member of club or organization: Not on file     Attends meetings of clubs or organizations: Not on file     Relationship status: Not on file   Other Topics Concern    Not on file   Social History Narrative    Not on file     Medication List with Changes/Refills   New Medications    DICLOFENAC SODIUM (VOLTAREN) 1 % GEL    Apply 2 g topically 3 (three) times daily as needed.   Current Medications    ATORVASTATIN (LIPITOR) 40 MG TABLET    Take 1 tablet (40 mg total) by mouth once daily.    AZELASTINE (ASTELIN) 137 MCG (0.1 %) NASAL SPRAY    2 sprays (274 mcg total) by Nasal route 2 (two) times daily as needed for Rhinitis.    BIOTIN ORAL    Take by mouth.    CYCLOBENZAPRINE (FLEXERIL) 10 MG TABLET    TAKE 1/2 TO 1 TABLET BY MOUTH EVERY NIGHT AT BEDTIME AS NEEDED FOR MUSCLE SPASMS. MAY CAUSE DROWSINESS    ERGOCALCIFEROL (ERGOCALCIFEROL) 50,000 UNIT CAP    TAKE 1 CAPSULE BY MOUTH EVERY 7 DAYS    FAMOTIDINE (PEPCID) 20 MG TABLET    Take 1 tablet (20 mg total) by mouth 2 (two) times daily.    FUROSEMIDE (LASIX) 40 MG TABLET    TAKE 1 TABLET(40 MG) BY  MOUTH EVERY DAY    GABAPENTIN (NEURONTIN) 300 MG CAPSULE    TAKE 2 CAPSULES(600 MG) BY MOUTH TWICE DAILY    GABAPENTIN (NEURONTIN) 600 MG TABLET    TAKE 1 AND 1/2 TABLETS BY MOUTH THREE TIMES DAILY. MAY CAUSE DROWSINESS    LEVOCETIRIZINE (XYZAL) 5 MG TABLET    Take 1 tablet (5 mg total) by mouth every evening.    MAGNESIUM OXIDE (MAG-OX) 400 MG (241.3 MG MAGNESIUM) TABLET    Take 1 tablet (400 mg total) by mouth 2 (two) times daily.    METHOCARBAMOL (ROBAXIN) 750 MG TAB    TK 1 T PO BID PRF MUSCLE SPASMS.    METHYLPREDNISOLONE (MEDROL DOSEPACK) 4 MG TABLET    Take as directed on the package.    NIFEDIPINE (PROCARDIA-XL) 60 MG (OSM) 24 HR TABLET    Take 1 tablet (60 mg total) by mouth once daily.    TACROLIMUS (PROGRAF) 1 MG CAP    Take 3 capsules (3 mg total) by mouth every morning AND 2 capsules (2 mg total) every evening.    TRAZODONE (DESYREL) 50 MG TABLET    Take 0.5 tablets (25 mg total) by mouth every evening.   Discontinued Medications    DICLOFENAC SODIUM, BULK, 100 % POWD    Apply 1 application topically daily as needed.     Review of patient's allergies indicates:   Allergen Reactions    Ferrous sulfate Other (See Comments)     Patient states the pill makes her sick. She stated she would rather have a shot           Objective:        General    Nursing note and vitals reviewed.  Constitutional: She is oriented to person, place, and time. She appears well-developed and well-nourished.   HENT:   Head: Normocephalic and atraumatic.   Eyes: EOM are normal.   Cardiovascular: Normal rate and regular rhythm.    Pulmonary/Chest: Effort normal.   Abdominal: Soft.   Neurological: She is alert and oriented to person, place, and time.   Psychiatric: She has a normal mood and affect. Her behavior is normal.         Left Hand/Wrist Exam     Inspection   Scars: Wrist - absent Hand -  absent  Effusion: Wrist - absent Hand -  absent  Bruising: Wrist - absent Hand -  absent  Deformity: Wrist - absent Hand -   absent    Range of Motion     Wrist   Extension: normal   Flexion: normal   Pronation: normal   Supination: normal     Tests   Phalens Sign: negative  Tinel's sign (median nerve): negative  Finkelstein's test: negative    Atrophy  Thenar:  Negative  Hypothenar:  negative  Intrinsic: negative  1st Dorsal Interosseous:  negative    Other     Sensory Exam  Median Distribution: normal  Ulnar Distribution: normal  Radial Distribution: normal    Comments:  2+ radial pulse  Full flex/ext all MCP, PIP and DIP joints  TTP 1st CMC jt  No pain with rotatory compression 1st CMC  No crepitus 1st CMC jt  No TTP 1st dorsal compartment      Left Elbow Exam     Tests   Tinel's sign (cubital tunnel): negative        Muscle Strength   Right Upper Extremity   Wrist extension: 5/5/5   Wrist flexion: 5/5/5   : 5/5/5   Thumb - APB: 5/5  Pinch Mechanism: 5/5  Left Upper Extremity  Wrist extension: 5/5/5   Wrist flexion: 5/5/5   :  5/5/5   Thumb - APB: 4/5  Pinch Mechanism: 4/5    Vascular Exam       Capillary Refill  Left Hand: normal capillary refill            Xray Images and report were reviewed today.  I agree with the radiologist's interpretation.    X-Ray Hand Complete Left  Narrative: EXAMINATION:  XR HAND COMPLETE 3 VIEW LEFT    CLINICAL HISTORY:  . Pain in left hand    TECHNIQUE:  PA, lateral, and oblique views of the left hand were performed.    COMPARISON:  November 28, 2018    FINDINGS:  No significant interval change from prior exam.  Mild multilevel degenerative changes throughout the wrist and hand.  No acute or healing fracture or dislocation.  No new or developing focal erosion or periosteal reaction.  No significant soft tissue calcification or foreign body.  Impression: As above.    Electronically signed by: Wayne Guzman MD  Date:    09/23/2019  Time:    11:25        Assessment:       Encounter Diagnoses   Name Primary?    Left hand pain Yes    Liver replaced by transplant     History of Helicobacter pylori  infection           Plan:       Marcie was seen today for pain.    Diagnoses and all orders for this visit:    Left hand pain  -     diclofenac sodium (VOLTAREN) 1 % Gel; Apply 2 g topically 3 (three) times daily as needed.    Liver replaced by transplant    History of Helicobacter pylori infection        Marcie Bazan is an established pt who comes in today for new problem of the left hand as above.  I saw her previously for a trigger finger.  However, she is not actively triggering nor is she tender over the A1 pulley.  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  I recommend a thumb spica wrist splint.  She may use that for comfort.  Also recommend warm water soaks.  I have sent a message to her gastroenterologist, Dr. Souza, to see if she is cleared to use Voltaren gel.  She has a history of a liver transplant done in December of 2017.  I would defer the use of oral NSAIDs to her gastroenterologist, as well.  She probably should stay away from oral anti-inflammatories.  I will see her back in 1 month without x-rays to re-evaluate.  She verbalizes understanding and agrees    Follow up in about 1 month (around 10/23/2019).    The patient understands, chooses and consents to this plan and accepts all   the risks which include but are not limited to the risks mentioned above.     Disclaimer: This note was prepared using a voice recognition system and is likely to have sound alike errors within the text.

## 2019-09-30 ENCOUNTER — PATIENT MESSAGE (OUTPATIENT)
Dept: TRANSPLANT | Facility: CLINIC | Age: 54
End: 2019-09-30

## 2019-10-01 ENCOUNTER — LAB VISIT (OUTPATIENT)
Dept: LAB | Facility: HOSPITAL | Age: 54
End: 2019-10-01
Attending: INTERNAL MEDICINE
Payer: MEDICAID

## 2019-10-01 DIAGNOSIS — Z94.4 LIVER REPLACED BY TRANSPLANT: ICD-10-CM

## 2019-10-01 LAB
ALBUMIN SERPL BCP-MCNC: 3.6 G/DL (ref 3.5–5.2)
ALP SERPL-CCNC: 158 U/L (ref 55–135)
ALT SERPL W/O P-5'-P-CCNC: 11 U/L (ref 10–44)
ANION GAP SERPL CALC-SCNC: 16 MMOL/L (ref 8–16)
AST SERPL-CCNC: 12 U/L (ref 10–40)
BASOPHILS # BLD AUTO: 0.03 K/UL (ref 0–0.2)
BASOPHILS NFR BLD: 0.3 % (ref 0–1.9)
BILIRUB SERPL-MCNC: 0.3 MG/DL (ref 0.1–1)
BUN SERPL-MCNC: 7 MG/DL (ref 6–20)
CALCIUM SERPL-MCNC: 9.6 MG/DL (ref 8.7–10.5)
CHLORIDE SERPL-SCNC: 101 MMOL/L (ref 95–110)
CO2 SERPL-SCNC: 23 MMOL/L (ref 23–29)
CREAT SERPL-MCNC: 1 MG/DL (ref 0.5–1.4)
DIFFERENTIAL METHOD: ABNORMAL
EOSINOPHIL # BLD AUTO: 0.1 K/UL (ref 0–0.5)
EOSINOPHIL NFR BLD: 1.1 % (ref 0–8)
ERYTHROCYTE [DISTWIDTH] IN BLOOD BY AUTOMATED COUNT: 15.9 % (ref 11.5–14.5)
EST. GFR  (AFRICAN AMERICAN): >60 ML/MIN/1.73 M^2
EST. GFR  (NON AFRICAN AMERICAN): >60 ML/MIN/1.73 M^2
GLUCOSE SERPL-MCNC: 85 MG/DL (ref 70–110)
HCT VFR BLD AUTO: 41.9 % (ref 37–48.5)
HGB BLD-MCNC: 12.4 G/DL (ref 12–16)
IMM GRANULOCYTES # BLD AUTO: 0.04 K/UL (ref 0–0.04)
IMM GRANULOCYTES NFR BLD AUTO: 0.3 % (ref 0–0.5)
LYMPHOCYTES # BLD AUTO: 2.2 K/UL (ref 1–4.8)
LYMPHOCYTES NFR BLD: 18.7 % (ref 18–48)
MAGNESIUM SERPL-MCNC: 1.7 MG/DL (ref 1.6–2.6)
MCH RBC QN AUTO: 23.9 PG (ref 27–31)
MCHC RBC AUTO-ENTMCNC: 29.6 G/DL (ref 32–36)
MCV RBC AUTO: 81 FL (ref 82–98)
MONOCYTES # BLD AUTO: 0.7 K/UL (ref 0.3–1)
MONOCYTES NFR BLD: 6.2 % (ref 4–15)
NEUTROPHILS # BLD AUTO: 8.7 K/UL (ref 1.8–7.7)
NEUTROPHILS NFR BLD: 73.4 % (ref 38–73)
NRBC BLD-RTO: 0 /100 WBC
PLATELET # BLD AUTO: 87 K/UL (ref 150–350)
PMV BLD AUTO: 10.6 FL (ref 9.2–12.9)
POTASSIUM SERPL-SCNC: 3.7 MMOL/L (ref 3.5–5.1)
PROT SERPL-MCNC: 7.8 G/DL (ref 6–8.4)
RBC # BLD AUTO: 5.19 M/UL (ref 4–5.4)
SODIUM SERPL-SCNC: 140 MMOL/L (ref 136–145)
WBC # BLD AUTO: 11.79 K/UL (ref 3.9–12.7)

## 2019-10-01 PROCEDURE — 85025 COMPLETE CBC W/AUTO DIFF WBC: CPT

## 2019-10-01 PROCEDURE — 80053 COMPREHEN METABOLIC PANEL: CPT

## 2019-10-01 PROCEDURE — 36415 COLL VENOUS BLD VENIPUNCTURE: CPT

## 2019-10-01 PROCEDURE — 80197 ASSAY OF TACROLIMUS: CPT

## 2019-10-01 PROCEDURE — 83735 ASSAY OF MAGNESIUM: CPT

## 2019-10-01 RX ORDER — CYCLOBENZAPRINE HCL 10 MG
TABLET ORAL
Qty: 30 TABLET | Refills: 0 | OUTPATIENT
Start: 2019-10-01

## 2019-10-02 LAB — TACROLIMUS BLD-MCNC: 10.3 NG/ML (ref 5–15)

## 2019-10-03 ENCOUNTER — PATIENT MESSAGE (OUTPATIENT)
Dept: TRANSPLANT | Facility: CLINIC | Age: 54
End: 2019-10-03

## 2019-10-03 DIAGNOSIS — Z94.4 LIVER REPLACED BY TRANSPLANT: ICD-10-CM

## 2019-10-03 NOTE — TELEPHONE ENCOUNTER
Dr. Souza reviewed your labs.  Sent patient message to let her know labs are stable, but Prograf was high.  Please decrease your Prograf dose to 2 mg twice a day and repeat labs on Monday.

## 2019-10-03 NOTE — TELEPHONE ENCOUNTER
----- Message from Joselin Souza MD sent at 10/3/2019  3:55 PM CDT -----  Tacro is high, decrease to 2mg twice a day and repeat labs next week

## 2019-10-04 RX ORDER — TACROLIMUS 1 MG/1
3 CAPSULE ORAL EVERY 12 HOURS
Qty: 180 CAPSULE | Refills: 11 | Status: SHIPPED | OUTPATIENT
Start: 2019-10-04 | End: 2019-10-10

## 2019-10-07 ENCOUNTER — LAB VISIT (OUTPATIENT)
Dept: LAB | Facility: HOSPITAL | Age: 54
End: 2019-10-07
Attending: INTERNAL MEDICINE
Payer: MEDICAID

## 2019-10-07 DIAGNOSIS — Z94.4 LIVER REPLACED BY TRANSPLANT: ICD-10-CM

## 2019-10-07 LAB
ALBUMIN SERPL BCP-MCNC: 3.6 G/DL (ref 3.5–5.2)
ALP SERPL-CCNC: 141 U/L (ref 55–135)
ALT SERPL W/O P-5'-P-CCNC: 10 U/L (ref 10–44)
ANION GAP SERPL CALC-SCNC: 11 MMOL/L (ref 8–16)
AST SERPL-CCNC: 10 U/L (ref 10–40)
BASOPHILS # BLD AUTO: 0.05 K/UL (ref 0–0.2)
BASOPHILS NFR BLD: 0.4 % (ref 0–1.9)
BILIRUB SERPL-MCNC: 0.4 MG/DL (ref 0.1–1)
BUN SERPL-MCNC: 12 MG/DL (ref 6–20)
CALCIUM SERPL-MCNC: 9.2 MG/DL (ref 8.7–10.5)
CHLORIDE SERPL-SCNC: 100 MMOL/L (ref 95–110)
CO2 SERPL-SCNC: 29 MMOL/L (ref 23–29)
CREAT SERPL-MCNC: 0.9 MG/DL (ref 0.5–1.4)
DIFFERENTIAL METHOD: ABNORMAL
EOSINOPHIL # BLD AUTO: 0.1 K/UL (ref 0–0.5)
EOSINOPHIL NFR BLD: 1 % (ref 0–8)
ERYTHROCYTE [DISTWIDTH] IN BLOOD BY AUTOMATED COUNT: 16.1 % (ref 11.5–14.5)
EST. GFR  (AFRICAN AMERICAN): >60 ML/MIN/1.73 M^2
EST. GFR  (NON AFRICAN AMERICAN): >60 ML/MIN/1.73 M^2
GLUCOSE SERPL-MCNC: 91 MG/DL (ref 70–110)
HCT VFR BLD AUTO: 41.3 % (ref 37–48.5)
HGB BLD-MCNC: 12.2 G/DL (ref 12–16)
IMM GRANULOCYTES # BLD AUTO: 0.06 K/UL (ref 0–0.04)
IMM GRANULOCYTES NFR BLD AUTO: 0.5 % (ref 0–0.5)
LYMPHOCYTES # BLD AUTO: 2.7 K/UL (ref 1–4.8)
LYMPHOCYTES NFR BLD: 20.8 % (ref 18–48)
MAGNESIUM SERPL-MCNC: 1.5 MG/DL (ref 1.6–2.6)
MCH RBC QN AUTO: 23.8 PG (ref 27–31)
MCHC RBC AUTO-ENTMCNC: 29.5 G/DL (ref 32–36)
MCV RBC AUTO: 81 FL (ref 82–98)
MONOCYTES # BLD AUTO: 0.8 K/UL (ref 0.3–1)
MONOCYTES NFR BLD: 5.9 % (ref 4–15)
NEUTROPHILS # BLD AUTO: 9.3 K/UL (ref 1.8–7.7)
NEUTROPHILS NFR BLD: 71.4 % (ref 38–73)
NRBC BLD-RTO: 0 /100 WBC
PLATELET # BLD AUTO: 94 K/UL (ref 150–350)
PMV BLD AUTO: 12.3 FL (ref 9.2–12.9)
POTASSIUM SERPL-SCNC: 3.6 MMOL/L (ref 3.5–5.1)
PROT SERPL-MCNC: 7.5 G/DL (ref 6–8.4)
RBC # BLD AUTO: 5.13 M/UL (ref 4–5.4)
SODIUM SERPL-SCNC: 140 MMOL/L (ref 136–145)
WBC # BLD AUTO: 12.96 K/UL (ref 3.9–12.7)

## 2019-10-07 PROCEDURE — 80053 COMPREHEN METABOLIC PANEL: CPT

## 2019-10-07 PROCEDURE — 85025 COMPLETE CBC W/AUTO DIFF WBC: CPT

## 2019-10-07 PROCEDURE — 36415 COLL VENOUS BLD VENIPUNCTURE: CPT

## 2019-10-07 PROCEDURE — 83735 ASSAY OF MAGNESIUM: CPT

## 2019-10-07 PROCEDURE — 80197 ASSAY OF TACROLIMUS: CPT

## 2019-10-08 LAB — TACROLIMUS BLD-MCNC: 9.4 NG/ML (ref 5–15)

## 2019-10-10 ENCOUNTER — PATIENT MESSAGE (OUTPATIENT)
Dept: GASTROENTEROLOGY | Facility: CLINIC | Age: 54
End: 2019-10-10

## 2019-10-10 DIAGNOSIS — Z94.4 LIVER REPLACED BY TRANSPLANT: ICD-10-CM

## 2019-10-10 NOTE — TELEPHONE ENCOUNTER
Dr. Souza reviewed your labs.  Sent patient message to let her know labs are stable, Prograf level too high.  Will decrease Prograf to 3 mg in the morning and 2 mg in the evening.  Repeat labs on 10/15/19.

## 2019-10-10 NOTE — TELEPHONE ENCOUNTER
----- Message from Joselin Souza MD sent at 10/10/2019  1:48 PM CDT -----  Decrease taco to 3mg in the morning and 2mg in the evening with repeat labs next week, including magnesium

## 2019-10-11 ENCOUNTER — PATIENT MESSAGE (OUTPATIENT)
Dept: GASTROENTEROLOGY | Facility: CLINIC | Age: 54
End: 2019-10-11

## 2019-10-13 DIAGNOSIS — E83.42 HYPOMAGNESEMIA: ICD-10-CM

## 2019-10-15 ENCOUNTER — LAB VISIT (OUTPATIENT)
Dept: LAB | Facility: HOSPITAL | Age: 54
End: 2019-10-15
Attending: INTERNAL MEDICINE
Payer: MEDICAID

## 2019-10-15 DIAGNOSIS — Z94.4 LIVER REPLACED BY TRANSPLANT: ICD-10-CM

## 2019-10-15 LAB
ALBUMIN SERPL BCP-MCNC: 3.5 G/DL (ref 3.5–5.2)
ALP SERPL-CCNC: 165 U/L (ref 55–135)
ALT SERPL W/O P-5'-P-CCNC: 14 U/L (ref 10–44)
ANION GAP SERPL CALC-SCNC: 10 MMOL/L (ref 8–16)
AST SERPL-CCNC: 14 U/L (ref 10–40)
BASOPHILS # BLD AUTO: 0.04 K/UL (ref 0–0.2)
BASOPHILS NFR BLD: 0.3 % (ref 0–1.9)
BILIRUB SERPL-MCNC: 0.5 MG/DL (ref 0.1–1)
BUN SERPL-MCNC: 10 MG/DL (ref 6–20)
CALCIUM SERPL-MCNC: 10 MG/DL (ref 8.7–10.5)
CHLORIDE SERPL-SCNC: 102 MMOL/L (ref 95–110)
CO2 SERPL-SCNC: 29 MMOL/L (ref 23–29)
CREAT SERPL-MCNC: 0.9 MG/DL (ref 0.5–1.4)
DIFFERENTIAL METHOD: ABNORMAL
EOSINOPHIL # BLD AUTO: 0.1 K/UL (ref 0–0.5)
EOSINOPHIL NFR BLD: 1.1 % (ref 0–8)
ERYTHROCYTE [DISTWIDTH] IN BLOOD BY AUTOMATED COUNT: 16.1 % (ref 11.5–14.5)
EST. GFR  (AFRICAN AMERICAN): >60 ML/MIN/1.73 M^2
EST. GFR  (NON AFRICAN AMERICAN): >60 ML/MIN/1.73 M^2
GLUCOSE SERPL-MCNC: 102 MG/DL (ref 70–110)
HCT VFR BLD AUTO: 41.3 % (ref 37–48.5)
HGB BLD-MCNC: 12.4 G/DL (ref 12–16)
IMM GRANULOCYTES # BLD AUTO: 0.04 K/UL (ref 0–0.04)
IMM GRANULOCYTES NFR BLD AUTO: 0.3 % (ref 0–0.5)
LYMPHOCYTES # BLD AUTO: 2.3 K/UL (ref 1–4.8)
LYMPHOCYTES NFR BLD: 19.1 % (ref 18–48)
MAGNESIUM SERPL-MCNC: 1.8 MG/DL (ref 1.6–2.6)
MCH RBC QN AUTO: 24 PG (ref 27–31)
MCHC RBC AUTO-ENTMCNC: 30 G/DL (ref 32–36)
MCV RBC AUTO: 80 FL (ref 82–98)
MONOCYTES # BLD AUTO: 0.7 K/UL (ref 0.3–1)
MONOCYTES NFR BLD: 6.2 % (ref 4–15)
NEUTROPHILS # BLD AUTO: 8.6 K/UL (ref 1.8–7.7)
NEUTROPHILS NFR BLD: 73 % (ref 38–73)
NRBC BLD-RTO: 0 /100 WBC
PLATELET # BLD AUTO: 58 K/UL (ref 150–350)
PMV BLD AUTO: 10.2 FL (ref 9.2–12.9)
POTASSIUM SERPL-SCNC: 3.8 MMOL/L (ref 3.5–5.1)
PROT SERPL-MCNC: 7.6 G/DL (ref 6–8.4)
RBC # BLD AUTO: 5.16 M/UL (ref 4–5.4)
SODIUM SERPL-SCNC: 141 MMOL/L (ref 136–145)
WBC # BLD AUTO: 11.83 K/UL (ref 3.9–12.7)

## 2019-10-15 PROCEDURE — 85025 COMPLETE CBC W/AUTO DIFF WBC: CPT

## 2019-10-15 PROCEDURE — 80053 COMPREHEN METABOLIC PANEL: CPT

## 2019-10-15 PROCEDURE — 83735 ASSAY OF MAGNESIUM: CPT

## 2019-10-15 PROCEDURE — 36415 COLL VENOUS BLD VENIPUNCTURE: CPT

## 2019-10-15 PROCEDURE — 80197 ASSAY OF TACROLIMUS: CPT

## 2019-10-16 LAB — TACROLIMUS BLD-MCNC: 4.8 NG/ML (ref 5–15)

## 2019-10-16 RX ORDER — LANOLIN ALCOHOL/MO/W.PET/CERES
CREAM (GRAM) TOPICAL
Qty: 270 TABLET | Refills: 0 | Status: SHIPPED | OUTPATIENT
Start: 2019-10-16 | End: 2019-10-17 | Stop reason: SDUPTHER

## 2019-10-16 RX ORDER — TACROLIMUS 1 MG/1
CAPSULE ORAL
Qty: 90 CAPSULE | Refills: 11 | Status: SHIPPED | OUTPATIENT
Start: 2019-10-16 | End: 2019-11-06 | Stop reason: SDUPTHER

## 2019-10-17 ENCOUNTER — TELEPHONE (OUTPATIENT)
Dept: TRANSPLANT | Facility: CLINIC | Age: 54
End: 2019-10-17

## 2019-10-17 DIAGNOSIS — E83.42 HYPOMAGNESEMIA: ICD-10-CM

## 2019-10-17 NOTE — TELEPHONE ENCOUNTER
Dr. Souza reviewed your labs.  Sent patient message to let her know labs are stable, but Prograf level is running low.  The doctor would like to repeat labs next week before changing dose.

## 2019-10-17 NOTE — TELEPHONE ENCOUNTER
----- Message from Joselin Souza MD sent at 10/16/2019 10:20 AM CDT -----  Technical level is running low.  Repeat labs next week to trim

## 2019-10-21 RX ORDER — LANOLIN ALCOHOL/MO/W.PET/CERES
800 CREAM (GRAM) TOPICAL 2 TIMES DAILY
Qty: 120 TABLET | Refills: 6
Start: 2019-10-21 | End: 2020-04-30

## 2019-10-22 ENCOUNTER — LAB VISIT (OUTPATIENT)
Dept: LAB | Facility: HOSPITAL | Age: 54
End: 2019-10-22
Attending: INTERNAL MEDICINE
Payer: MEDICAID

## 2019-10-22 ENCOUNTER — OFFICE VISIT (OUTPATIENT)
Dept: ORTHOPEDICS | Facility: CLINIC | Age: 54
End: 2019-10-22
Payer: MEDICAID

## 2019-10-22 VITALS
BODY MASS INDEX: 36.32 KG/M2 | SYSTOLIC BLOOD PRESSURE: 147 MMHG | HEART RATE: 77 BPM | HEIGHT: 63 IN | DIASTOLIC BLOOD PRESSURE: 92 MMHG | WEIGHT: 205 LBS

## 2019-10-22 DIAGNOSIS — M70.62 GREATER TROCHANTERIC BURSITIS OF LEFT HIP: ICD-10-CM

## 2019-10-22 DIAGNOSIS — I10 ESSENTIAL HYPERTENSION: ICD-10-CM

## 2019-10-22 DIAGNOSIS — Z79.60 LONG-TERM USE OF IMMUNOSUPPRESSANT MEDICATION: ICD-10-CM

## 2019-10-22 DIAGNOSIS — M70.61 GREATER TROCHANTERIC BURSITIS OF BOTH HIPS: Primary | ICD-10-CM

## 2019-10-22 DIAGNOSIS — E78.2 MIXED HYPERLIPIDEMIA: ICD-10-CM

## 2019-10-22 DIAGNOSIS — M70.62 GREATER TROCHANTERIC BURSITIS OF BOTH HIPS: Primary | ICD-10-CM

## 2019-10-22 DIAGNOSIS — Z94.4 LIVER REPLACED BY TRANSPLANT: ICD-10-CM

## 2019-10-22 DIAGNOSIS — M70.61 GREATER TROCHANTERIC BURSITIS OF RIGHT HIP: ICD-10-CM

## 2019-10-22 LAB
ALBUMIN SERPL BCP-MCNC: 3.4 G/DL (ref 3.5–5.2)
ALP SERPL-CCNC: 144 U/L (ref 55–135)
ALT SERPL W/O P-5'-P-CCNC: 11 U/L (ref 10–44)
ANION GAP SERPL CALC-SCNC: 9 MMOL/L (ref 8–16)
AST SERPL-CCNC: 14 U/L (ref 10–40)
BASOPHILS # BLD AUTO: 0.04 K/UL (ref 0–0.2)
BASOPHILS NFR BLD: 0.4 % (ref 0–1.9)
BILIRUB SERPL-MCNC: 0.3 MG/DL (ref 0.1–1)
BUN SERPL-MCNC: 10 MG/DL (ref 6–20)
CALCIUM SERPL-MCNC: 9.4 MG/DL (ref 8.7–10.5)
CHLORIDE SERPL-SCNC: 105 MMOL/L (ref 95–110)
CO2 SERPL-SCNC: 26 MMOL/L (ref 23–29)
CREAT SERPL-MCNC: 0.8 MG/DL (ref 0.5–1.4)
DIFFERENTIAL METHOD: ABNORMAL
EOSINOPHIL # BLD AUTO: 0.1 K/UL (ref 0–0.5)
EOSINOPHIL NFR BLD: 0.9 % (ref 0–8)
ERYTHROCYTE [DISTWIDTH] IN BLOOD BY AUTOMATED COUNT: 16.1 % (ref 11.5–14.5)
EST. GFR  (AFRICAN AMERICAN): >60 ML/MIN/1.73 M^2
EST. GFR  (NON AFRICAN AMERICAN): >60 ML/MIN/1.73 M^2
GLUCOSE SERPL-MCNC: 84 MG/DL (ref 70–110)
HCT VFR BLD AUTO: 37.7 % (ref 37–48.5)
HGB BLD-MCNC: 11.7 G/DL (ref 12–16)
IMM GRANULOCYTES # BLD AUTO: 0.04 K/UL (ref 0–0.04)
IMM GRANULOCYTES NFR BLD AUTO: 0.4 % (ref 0–0.5)
LYMPHOCYTES # BLD AUTO: 2.1 K/UL (ref 1–4.8)
LYMPHOCYTES NFR BLD: 19.9 % (ref 18–48)
MAGNESIUM SERPL-MCNC: 1.6 MG/DL (ref 1.6–2.6)
MCH RBC QN AUTO: 24.4 PG (ref 27–31)
MCHC RBC AUTO-ENTMCNC: 31 G/DL (ref 32–36)
MCV RBC AUTO: 79 FL (ref 82–98)
MONOCYTES # BLD AUTO: 0.5 K/UL (ref 0.3–1)
MONOCYTES NFR BLD: 5 % (ref 4–15)
NEUTROPHILS # BLD AUTO: 7.7 K/UL (ref 1.8–7.7)
NEUTROPHILS NFR BLD: 73.4 % (ref 38–73)
NRBC BLD-RTO: 0 /100 WBC
PLATELET # BLD AUTO: 104 K/UL (ref 150–350)
PMV BLD AUTO: 10.3 FL (ref 9.2–12.9)
POTASSIUM SERPL-SCNC: 3.9 MMOL/L (ref 3.5–5.1)
PROT SERPL-MCNC: 7.2 G/DL (ref 6–8.4)
RBC # BLD AUTO: 4.79 M/UL (ref 4–5.4)
SODIUM SERPL-SCNC: 140 MMOL/L (ref 136–145)
WBC # BLD AUTO: 10.49 K/UL (ref 3.9–12.7)

## 2019-10-22 PROCEDURE — 80053 COMPREHEN METABOLIC PANEL: CPT

## 2019-10-22 PROCEDURE — 83735 ASSAY OF MAGNESIUM: CPT

## 2019-10-22 PROCEDURE — 36415 COLL VENOUS BLD VENIPUNCTURE: CPT

## 2019-10-22 PROCEDURE — 85025 COMPLETE CBC W/AUTO DIFF WBC: CPT

## 2019-10-22 PROCEDURE — 80197 ASSAY OF TACROLIMUS: CPT

## 2019-10-22 PROCEDURE — 99213 OFFICE O/P EST LOW 20 MIN: CPT | Mod: PBBFAC,25 | Performed by: FAMILY MEDICINE

## 2019-10-22 PROCEDURE — 99999 PR PBB SHADOW E&M-EST. PATIENT-LVL III: ICD-10-PCS | Mod: PBBFAC,,, | Performed by: FAMILY MEDICINE

## 2019-10-22 PROCEDURE — 99999 PR PBB SHADOW E&M-EST. PATIENT-LVL III: CPT | Mod: PBBFAC,,, | Performed by: FAMILY MEDICINE

## 2019-10-22 PROCEDURE — 99214 OFFICE O/P EST MOD 30 MIN: CPT | Mod: 25,S$PBB,, | Performed by: FAMILY MEDICINE

## 2019-10-22 PROCEDURE — 20610 DRAIN/INJ JOINT/BURSA W/O US: CPT | Mod: PBBFAC,50 | Performed by: FAMILY MEDICINE

## 2019-10-22 PROCEDURE — 99214 PR OFFICE/OUTPT VISIT, EST, LEVL IV, 30-39 MIN: ICD-10-PCS | Mod: 25,S$PBB,, | Performed by: FAMILY MEDICINE

## 2019-10-22 PROCEDURE — 20610 DRAIN/INJ JOINT/BURSA W/O US: CPT | Mod: PBBFAC | Performed by: FAMILY MEDICINE

## 2019-10-22 PROCEDURE — 20610 LARGE JOINT ASPIRATION/INJECTION: R GREATER TROCHANTERIC BURSA: ICD-10-PCS | Mod: S$PBB,50,, | Performed by: FAMILY MEDICINE

## 2019-10-22 RX ORDER — TRIAMCINOLONE ACETONIDE 40 MG/ML
40 INJECTION, SUSPENSION INTRA-ARTICULAR; INTRAMUSCULAR
Status: DISCONTINUED | OUTPATIENT
Start: 2019-10-22 | End: 2019-10-22 | Stop reason: HOSPADM

## 2019-10-22 RX ORDER — METHYLPREDNISOLONE ACETATE 80 MG/ML
80 INJECTION, SUSPENSION INTRA-ARTICULAR; INTRALESIONAL; INTRAMUSCULAR; SOFT TISSUE
Status: DISCONTINUED | OUTPATIENT
Start: 2019-10-22 | End: 2019-10-22 | Stop reason: HOSPADM

## 2019-10-22 RX ADMIN — TRIAMCINOLONE ACETONIDE 40 MG: 40 INJECTION, SUSPENSION INTRA-ARTICULAR; INTRAMUSCULAR at 08:10

## 2019-10-22 RX ADMIN — METHYLPREDNISOLONE ACETATE 80 MG: 80 INJECTION, SUSPENSION INTRALESIONAL; INTRAMUSCULAR; INTRASYNOVIAL; SOFT TISSUE at 08:10

## 2019-10-22 NOTE — LETTER
October 22, 2019      Milton Ferrer, FNP  [address] [facility] [address]           [facility]  [id number] [place] [id number]  Phone: [phone]  Fax: [phone]          Patient: Marcie Bazan   MR Number: [id number]   YOB: 1965   Date of Visit: 10/22/2019       Dear Milton Ferrer:    Thank you for referring Marcie Bazan to me for evaluation. Attached you will find relevant portions of my assessment and plan of care.    If you have questions, please do not hesitate to call me. I look forward to following Marcie Bazan along with you.    Sincerely,    Alden Bee MD    Enclosure  CC:  No Recipients    If you would like to receive this communication electronically, please contact [email or web address] or [phone] to request more information on [employer] Link access.    For providers and/or their staff who would like to refer a patient to [facility], please contact us through our one-stop-shop provider referral line, [facility] , at [phone].    If you feel you have received this communication in error or would no longer like to receive these types of communications, please e-mail [email or web address]

## 2019-10-22 NOTE — PROCEDURES
Large Joint Aspiration/Injection: L greater trochanteric bursa  Date/Time: 10/22/2019 8:20 AM  Performed by: Alden Bee MD  Authorized by: Alden Bee MD     Consent Done?:  Yes (Verbal)  Indications:  Diagnostic evaluation and pain  Procedure site marked: Yes    Timeout: Prior to procedure the correct patient, procedure, and site was verified    Anesthesia  Local anesthesia used  Anesthesia: local infiltration  Anesthetic: topical anesthetic and lidocaine 2% without epinephrine  Anesthetic total: 1mL    Location:  Hip  Site:  L greater trochanteric bursa  Prep: Patient was prepped and draped in usual sterile fashion    Needle size:  25 G  Ultrasonic Guidance for needle placement: No  Approach:  Lateral  Medications:  80 mg methylPREDNISolone acetate 80 mg/mL  Patient tolerance:  Patient tolerated the procedure well with no immediate complications    Additional Comments: Verbal consent was obtained prior to the procedure.  Explained to the patient that there is a small risk of persistent pain, bleeding, and infection.  Discussed with the patient the steps of the procedure, including but not limited to, needle size and length, location of the injection, sterile technique using ChloraPrep and alcohol swabs, local anesthesia, risks/benefits, side effects, and alternatives including not performing the procedure.  Patient expressed understanding and verbally consented to the procedure after all pertinent questions related to the procedure were answered and explained before performing the procedure.    Patient experienced minimal blood loss (< 3 cc) and clean bandage was applied. Post procedure care was provided to the patient verbally and with their AVS. Patient was instructed to take it easy for the next 24-48 hours and was informed that their pain may increase once the anaesthesia wears off and before the steroid begins to work. Patient was instructed to ice area for 15 minutes PRN if pain  worsens. Patient was informed to contact clinic or go to the ED if they develop any fever, chills, redness, swelling, or other complications.

## 2019-10-22 NOTE — PROCEDURES
Large Joint Aspiration/Injection: R greater trochanteric bursa  Date/Time: 10/22/2019 8:20 AM  Performed by: Alden Bee MD  Authorized by: Alden Bee MD     Consent Done?:  Yes (Verbal)  Indications:  Diagnostic evaluation and pain  Procedure site marked: Yes    Timeout: Prior to procedure the correct patient, procedure, and site was verified    Anesthesia  Local anesthesia used  Anesthesia: local infiltration  Anesthetic: topical anesthetic and lidocaine 2% without epinephrine  Anesthetic total: 1mL    Location:  Hip  Site:  R greater trochanteric bursa  Prep: Patient was prepped and draped in usual sterile fashion    Needle size:  25 G  Ultrasonic Guidance for needle placement: No  Approach:  Lateral  Medications:  40 mg triamcinolone acetonide 40 mg/mL  Patient tolerance:  Patient tolerated the procedure well with no immediate complications    Additional Comments: Verbal consent was obtained prior to the procedure.  Explained to the patient that there is a small risk of persistent pain, bleeding, and infection.  Discussed with the patient the steps of the procedure, including but not limited to, needle size and length, location of the injection, sterile technique using ChloraPrep and alcohol swabs, local anesthesia, risks/benefits, side effects, and alternatives including not performing the procedure.  Patient expressed understanding and verbally consented to the procedure after all pertinent questions related to the procedure were answered and explained before performing the procedure.    Patient experienced minimal blood loss (< 3 cc) and clean bandage was applied. Post procedure care was provided to the patient verbally and with their AVS. Patient was instructed to take it easy for the next 24-48 hours and was informed that their pain may increase once the anaesthesia wears off and before the steroid begins to work. Patient was instructed to ice area for 15 minutes if pain worsens.  Patient was informed to contact clinic or go to the ED if they develop any fever, chills, redness, swelling, or other complications.
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
## 2019-10-23 LAB — TACROLIMUS BLD-MCNC: 4.7 NG/ML (ref 5–15)

## 2019-10-24 ENCOUNTER — PATIENT MESSAGE (OUTPATIENT)
Dept: SPORTS MEDICINE | Facility: CLINIC | Age: 54
End: 2019-10-24

## 2019-10-25 ENCOUNTER — TELEPHONE (OUTPATIENT)
Dept: TRANSPLANT | Facility: CLINIC | Age: 54
End: 2019-10-25

## 2019-10-25 NOTE — TELEPHONE ENCOUNTER
Dr. Souza reviewed your labs.  Sent patient message to let her know labs are stable, no changes.  Prograf level running a little low so please repeat labs on 11/04/19

## 2019-10-25 NOTE — TELEPHONE ENCOUNTER
----- Message from Joselin Souza MD sent at 10/25/2019  3:13 PM CDT -----  Labs ok although tacro slightly low repeat labs in 2 weeks

## 2019-10-31 ENCOUNTER — OFFICE VISIT (OUTPATIENT)
Dept: INTERNAL MEDICINE | Facility: CLINIC | Age: 54
End: 2019-10-31
Payer: MEDICAID

## 2019-10-31 VITALS
TEMPERATURE: 99 F | HEART RATE: 96 BPM | SYSTOLIC BLOOD PRESSURE: 130 MMHG | HEIGHT: 63 IN | WEIGHT: 204.38 LBS | OXYGEN SATURATION: 99 % | DIASTOLIC BLOOD PRESSURE: 80 MMHG | BODY MASS INDEX: 36.21 KG/M2

## 2019-10-31 DIAGNOSIS — K62.5 RECTAL BLEEDING: Primary | ICD-10-CM

## 2019-10-31 DIAGNOSIS — H91.93 BILATERAL HEARING LOSS, UNSPECIFIED HEARING LOSS TYPE: ICD-10-CM

## 2019-10-31 DIAGNOSIS — K21.9 GASTROESOPHAGEAL REFLUX DISEASE, ESOPHAGITIS PRESENCE NOT SPECIFIED: ICD-10-CM

## 2019-10-31 DIAGNOSIS — E78.5 HYPERLIPIDEMIA, UNSPECIFIED HYPERLIPIDEMIA TYPE: ICD-10-CM

## 2019-10-31 DIAGNOSIS — I10 ESSENTIAL HYPERTENSION: Chronic | ICD-10-CM

## 2019-10-31 DIAGNOSIS — K64.1 GRADE II HEMORRHOIDS: ICD-10-CM

## 2019-10-31 PROCEDURE — 99214 PR OFFICE/OUTPT VISIT, EST, LEVL IV, 30-39 MIN: ICD-10-PCS | Mod: S$PBB,,, | Performed by: PHYSICIAN ASSISTANT

## 2019-10-31 PROCEDURE — 99215 OFFICE O/P EST HI 40 MIN: CPT | Mod: PBBFAC | Performed by: PHYSICIAN ASSISTANT

## 2019-10-31 PROCEDURE — 99214 OFFICE O/P EST MOD 30 MIN: CPT | Mod: S$PBB,,, | Performed by: PHYSICIAN ASSISTANT

## 2019-10-31 PROCEDURE — 99999 PR PBB SHADOW E&M-EST. PATIENT-LVL V: ICD-10-PCS | Mod: PBBFAC,,, | Performed by: PHYSICIAN ASSISTANT

## 2019-10-31 PROCEDURE — 99999 PR PBB SHADOW E&M-EST. PATIENT-LVL V: CPT | Mod: PBBFAC,,, | Performed by: PHYSICIAN ASSISTANT

## 2019-10-31 RX ORDER — HYDROCORTISONE ACETATE 25 MG/1
25 SUPPOSITORY RECTAL 2 TIMES DAILY
Qty: 20 SUPPOSITORY | Refills: 0 | Status: SHIPPED | OUTPATIENT
Start: 2019-10-31 | End: 2019-11-10

## 2019-10-31 RX ORDER — NIFEDIPINE 30 MG/1
30 TABLET, EXTENDED RELEASE ORAL DAILY
Qty: 90 TABLET | Refills: 3 | Status: SHIPPED | OUTPATIENT
Start: 2019-10-31 | End: 2020-10-12

## 2019-10-31 NOTE — PROGRESS NOTES
Subjective:      Patient ID: Marcie Bazan is a 54 y.o. female.    Chief Complaint: Nausea and Headache    Ms. Bazan is here today for a follow up with multiple complaints.   Has had to wear a pad due to rectal bleeding on a daily basis. Not just with a BM. Denies having any noticeable external hemorrhoids. This has been going on since her surgery 2 years ago.   Rectal Bleeding   This is a chronic problem. Episode onset: 2 years. The problem occurs daily. The problem has been waxing and waning. Associated symptoms include abdominal pain (epigastric) and chest pain. Pertinent negatives include no anorexia, arthralgias, change in bowel habit, chills, congestion, coughing, diaphoresis, fatigue, fever, headaches, joint swelling, myalgias, nausea, neck pain, numbness, rash, sore throat, swollen glands, urinary symptoms, vertigo, visual change, vomiting or weakness. Nothing aggravates the symptoms. She has tried nothing for the symptoms.   Gastroesophageal Reflux   She complains of abdominal pain (epigastric), belching, chest pain, early satiety, globus sensation and heartburn. She reports no choking, no coughing, no dysphagia, no hoarse voice, no nausea, no sore throat, no stridor, no tooth decay, no water brash or no wheezing. This is a chronic problem. The current episode started more than 1 month ago. The problem occurs frequently. The problem has been gradually worsening. The heartburn is located in the substernum. The heartburn is of moderate intensity. Pertinent negatives include no anemia, fatigue, melena, muscle weakness, orthopnea or weight loss. Treatments tried: pepcid. The treatment provided mild relief. Past procedures include an EGD.     Additionally, pt reports some gradual decreased hearing over the past year. Denies that one ear is worse. Has to put tv on loud. Denies any prior exposure to loud noises. Denies any ear pain or drainage.   Requesting refill on her blood pressure medication. She has been  out for at least 6 months. Denies any chest pain, blurred vision, or chronic headaches.     Patient Active Problem List   Diagnosis    Allergic rhinitis    Anemia of chronic disease    Erosive esophagitis    Essential hypertension    History of abnormal cervical Pap smear    History of Helicobacter pylori infection    Lipoma    Chronic right-sided low back pain with sciatica    Multinodular thyroid    Hypomagnesemia    Liver replaced by transplant    Adrenal cortical steroids causing adverse effect in therapeutic use    At risk for opportunistic infections    Grade II hemorrhoids    Long-term use of immunosuppressant medication    Accelerated liver transplant rejection    Osteoarthritis of spine with radiculopathy, lumbar region    Vitamin D deficiency    Mixed hyperlipidemia    Adhesive capsulitis of both shoulders    SI (sacroiliac) pain    It band syndrome, left    Right leg pain    Piriformis syndrome of both sides    Greater trochanteric bursitis of both hips    Spondylosis of lumbar region without myelopathy or radiculopathy    Weakness of both hips    Obesity (BMI 30.0-34.9)    Difficulty walking    Muscle weakness (generalized)    Chronic bilateral low back pain with sciatica    Sacroiliac joint dysfunction       Review of Systems   Constitutional: Negative for activity change, appetite change, chills, diaphoresis, fatigue, fever, unexpected weight change and weight loss.   HENT: Positive for hearing loss. Negative for congestion, hoarse voice, postnasal drip, rhinorrhea, sore throat, trouble swallowing and voice change.    Eyes: Negative.  Negative for visual disturbance.   Respiratory: Negative for cough, choking, chest tightness, shortness of breath and wheezing.    Cardiovascular: Positive for chest pain. Negative for palpitations and leg swelling.   Gastrointestinal: Positive for abdominal pain (epigastric), anal bleeding, blood in stool, heartburn and hematochezia.  "Negative for abdominal distention, anorexia, change in bowel habit, constipation, diarrhea, dysphagia, melena, nausea, rectal pain and vomiting.   Endocrine: Negative for cold intolerance, heat intolerance, polydipsia and polyuria.   Genitourinary: Negative.  Negative for difficulty urinating and frequency.   Musculoskeletal: Negative for arthralgias, back pain, gait problem, joint swelling, myalgias, muscle weakness and neck pain.   Skin: Negative for color change, pallor, rash and wound.   Neurological: Negative for dizziness, vertigo, tremors, weakness, light-headedness, numbness and headaches.   Hematological: Negative for adenopathy.   Psychiatric/Behavioral: Negative for behavioral problems, confusion, self-injury, sleep disturbance and suicidal ideas. The patient is not nervous/anxious.      Objective:   /80 (BP Location: Left arm, Patient Position: Sitting, BP Method: Large (Manual))   Pulse 96   Temp 99.1 °F (37.3 °C) (Tympanic)   Ht 5' 3" (1.6 m)   Wt 92.7 kg (204 lb 5.9 oz)   LMP 01/01/1985 (Approximate) Comment: 1985  SpO2 99%   BMI 36.20 kg/m²     Physical Exam   Constitutional: She is oriented to person, place, and time. She appears well-developed and well-nourished.   HENT:   Head: Normocephalic and atraumatic.   Right Ear: Tympanic membrane, external ear and ear canal normal.   Left Ear: Tympanic membrane, external ear and ear canal normal.   Nose: Mucosal edema and rhinorrhea present.   Mouth/Throat: Oropharynx is clear and moist. No oropharyngeal exudate.   Eyes: Pupils are equal, round, and reactive to light. Conjunctivae and EOM are normal.   Neck: Normal range of motion. Neck supple.   Cardiovascular: Normal rate, regular rhythm and normal heart sounds. Exam reveals no gallop and no friction rub.   No murmur heard.  Pulmonary/Chest: Effort normal and breath sounds normal. No respiratory distress. She has no wheezes. She has no rales. She exhibits no tenderness.   Abdominal: Soft. " She exhibits no distension. There is no tenderness.   Musculoskeletal: Normal range of motion.   Lymphadenopathy:     She has no cervical adenopathy.   Neurological: She is alert and oriented to person, place, and time.   Skin: Skin is warm and dry.   Psychiatric: She has a normal mood and affect. Her behavior is normal. Judgment and thought content normal.   Vitals reviewed.      Assessment:     1. Rectal bleeding    2. Gastroesophageal reflux disease, esophagitis presence not specified    3. Bilateral hearing loss, unspecified hearing loss type    4. Hyperlipidemia, unspecified hyperlipidemia type    5. Essential hypertension    6. Grade II hemorrhoids      Plan:   Rectal bleeding  -     hydrocortisone (ANUSOL-HC) 25 mg suppository; Place 1 suppository (25 mg total) rectally 2 (two) times daily. for 10 days  Dispense: 20 suppository; Refill: 0  -     Ambulatory Referral to Gastroenterology  Educational handout on over-the-counter medications and at-home conservative care, pertinent to the patients diagnosis today, was handed to the patient and discussed in detail.    Gastroesophageal reflux disease, esophagitis presence not specified  -do not feel comfortable starting her on PPI due to history of liver disease and hypomagnesium. Advised her to discuss this with DR. Souza. We will also get her an apt with GI as well.     Bilateral hearing loss, unspecified hearing loss type  -     Ambulatory referral to ENT    Hyperlipidemia, unspecified hyperlipidemia type  -     Lipid panel; Future; Expected date: 11/04/2019    Essential hypertension  -     NIFEdipine (PROCARDIA-XL) 30 MG (OSM) 24 hr tablet; Take 1 tablet (30 mg total) by mouth once daily.  Dispense: 90 tablet; Refill: 3  -monitor BP at home once daily. Follow up in 2 weeks with log    Grade II hemorrhoids  -likely causing her rectal bleeding.   -noted on colonoscopy 12/5/2017      Follow up if symptoms worsen or fail to improve.

## 2019-10-31 NOTE — PATIENT INSTRUCTIONS
Understanding Hemorrhoids    Hemorrhoid tissues are cushions of blood vessels that swell slightly during bowel movements. Too much pressure on the anal canal can make these tissues remain enlarged, become inflamed, and cause symptoms. This can happen both inside and outside the anal canal.  Parts of the anal canal  The parts of the anal canal are:  · Internal hemorrhoid tissue is in the upper area of the anal canal.  · The rectum is the last several inches of the colon. This is where stool is stored prior to bowel movements.  · Anal sphincters are ring-shaped muscles that expand and contract to control the anal opening.  · External hemorrhoid tissue lies under the anal skin.  · The anus is the passage between the rectum and the outside of the body.  Normal hemorrhoid tissue  Hemorrhoid tissues play an important role in helping your body eliminate waste. Food passes from the stomach through the intestines. The waste (stool) then travels through the colon to the rectum. It is stored in the rectum until its ready to be passed from the anus. During bowel movements, hemorrhoids swell with blood and become slightly larger. This swelling helps protect and cushion the anal canal as stool passes from the body. Once the stool has passed, the tissues stop swelling and return to normal.  Problem hemorrhoids  Pressure due to straining or other factors can cause hemorrhoid tissues to remain swollen. When this happens to the hemorrhoid tissues in the anal canal theyre called internal hemorrhoids. Swollen tissues around the anal opening are called external hemorrhoids. Depending on the location, your symptoms can differ.  · Internal hemorrhoids often happen in clusters around the wall of the anal canal. They are usually painless. But they may prolapse (protrude out of the anus) due to straining or pressure from hard stool. After the bowel movement is over, they may then reduce (return inside the body). Internal hemorrhoids  often bleed. They can also discharge mucus.  ·   External hemorrhoids are located at the anal opening, just beneath the skin. These tissues rarely cause problems unless they thrombose (form a blood clot). When this happens, a hard, bluish lump may appear. A thrombosed hemorrhoid also causes sudden, severe pain. In time, the clot may go away on its own. This sometimes leaves a skin tag of tissue stretched by the clot.  Hemorrhoid symptoms  Hemorrhoid symptoms may include:  · Pain or a burning sensation  · Bleeding during bowel movements  · Protrusion of tissue from the anus  · Itching around the anus  Causes of hemorrhoids  Theres no single cause of hemorrhoids. Most often, though, they are caused by too much pressure on the anal canal. This can be due to:  · Chronic (ongoing) constipation  · Straining during bowel movements  · Sitting too long on the toilet  · Strenuous exercise or heavy lifting  · Pregnancy and childbirth  · Aging  · Diarrhea  Date Last Reviewed: 7/1/2016  © 8829-2497 [employer]. [address] [place] [address]. All rights reserved. This information is not intended as a substitute for professional medical care. Always follow your healthcare professional's instructions.        Treating Hemorrhoids: Removal  If your symptoms persist, your healthcare provider may recommend removing the hemorrhoid. This can be done in your healthcare provider's office or at a surgical center. In most cases, no special preparation is needed. Keep in mind that your treatment may differ depending on your symptoms and the location of the hemorrhoid.           Internal hemorrhoids  Youll be asked to lie or kneel on a table. Your healthcare provider then inserts an anoscope to view the anal canal. To treat the hemorrhoid, your healthcare provider will use one of the methods listed below. Because internal hemorrhoids do not have nerves that sense pain, you wont have too much discomfort. You can  often return to your normal routine the same day. If you have many hemorrhoids, you may need repeated treatments.  Banding  The banding method is done by placing tight elastic bands around the base of the hemorrhoid. This cuts off blood supply to the hemorrhoid, causing it to fall off. This usually takes about a week. The area then heals within a few days.  Infrared coagulation  This procedure is done using a small probe that exposes the hemorrhoid to short bursts of infrared light. This seals off the blood vessel, causing it to shrink. Slight bleeding may happen for a few days. The area usually heals within a week or two.  Sclerotherapy  Sclerotherapy is done by injecting a chemical into the tissue around the hemorrhoid. The chemical causes the hemorrhoid to shrink within a few days. Bleeding usually stops in about 24 hours.  Thrombosed external hemorrhoids  Thrombosed external hemorrhoids are often very painful. Thats because the swollen hemorrhoid stretches the sensitive skin around it. To relieve the pain, your healthcare provider may remove the blood clot. This takes just a few minutes. You may need to rest for a few days before returning to work.  · Numbing the hemorrhoid. Youll be asked to lie or kneel on a table. The hemorrhoid is then injected with a local anesthetic. This may cause some discomfort for a moment. But within a short time your healthcare provider will be able to remove the hemorrhoid without causing pain.  · Removing the hemorrhoid. A small incision is made to remove the blood clot. The hemorrhoid may also be removed. The skin is then either closed with sutures or left open to heal on its own. The area around the incision will likely be sore for a few days. But your pain should improve soon after the procedure.     Risks and complications  The possible risks and complications include:  · Infection  · Bleeding  · Trouble urinating (Doesn't happen with thrombosed external  hemorrhoids)  · Narrowing of the anal canal (very rare, doesn't happen with thrombosed external hemorrhoids)  When to call your healthcare provider  After your procedure, call your healthcare provider if you have:  · Increasing pain  · Fever or chills  · Persistent bleeding  · Trouble urinating   Date Last Reviewed: 7/1/2016  © 2225-8389 [employer]. [address] [place] [address]. All rights reserved. This information is not intended as a substitute for professional medical care. Always follow your healthcare professional's instructions.

## 2019-11-01 ENCOUNTER — CLINICAL SUPPORT (OUTPATIENT)
Dept: AUDIOLOGY | Facility: CLINIC | Age: 54
End: 2019-11-01
Payer: MEDICAID

## 2019-11-01 DIAGNOSIS — H90.3 SENSORINEURAL HEARING LOSS, BILATERAL: Primary | ICD-10-CM

## 2019-11-01 PROCEDURE — 92557 COMPREHENSIVE HEARING TEST: CPT | Mod: PBBFAC | Performed by: AUDIOLOGIST-HEARING AID FITTER

## 2019-11-01 PROCEDURE — 92567 TYMPANOMETRY: CPT | Mod: PBBFAC | Performed by: AUDIOLOGIST-HEARING AID FITTER

## 2019-11-01 NOTE — PROGRESS NOTES
Referring Provider:JOHANNY Suggs Blanca Bazan was seen 11/01/2019 for an audiological evaluation.  Patient complains of possible hearing loss bilaterally (Left ear worse than right ear). Sh denies tinnitus, otalgia, aural fullness, and vertigo. She does have a family hx of hearing loss (father wore hearing aids).    Results reveal a mild sensorineural hearing loss 250-8000 Hz for the right ear, and a mild sensorineural hearing loss 250-8000 Hz for the left ear.   Speech Reception Thresholds were  20 dBHL for the right ear and 20 dBHL for the left ear.   Word recognition scores were excellent for the right ear and excellent for the left ear.   Tympanograms were Type A, normal for the right ear and Type A, normal for the left ear.    Patient was counseled on the above findings.    Recommendations:  1. Hearing protection in noise.  2. Annual Audiograms

## 2019-11-04 ENCOUNTER — LAB VISIT (OUTPATIENT)
Dept: LAB | Facility: HOSPITAL | Age: 54
End: 2019-11-04
Attending: INTERNAL MEDICINE
Payer: MEDICAID

## 2019-11-04 DIAGNOSIS — Z94.4 LIVER REPLACED BY TRANSPLANT: ICD-10-CM

## 2019-11-04 DIAGNOSIS — E78.2 MIXED HYPERLIPIDEMIA: Primary | ICD-10-CM

## 2019-11-04 LAB
ALBUMIN SERPL BCP-MCNC: 3.4 G/DL (ref 3.5–5.2)
ALP SERPL-CCNC: 165 U/L (ref 55–135)
ALT SERPL W/O P-5'-P-CCNC: 13 U/L (ref 10–44)
ANION GAP SERPL CALC-SCNC: 10 MMOL/L (ref 8–16)
AST SERPL-CCNC: 10 U/L (ref 10–40)
BASOPHILS # BLD AUTO: 0.05 K/UL (ref 0–0.2)
BASOPHILS NFR BLD: 0.4 % (ref 0–1.9)
BILIRUB SERPL-MCNC: 0.3 MG/DL (ref 0.1–1)
BUN SERPL-MCNC: 22 MG/DL (ref 6–20)
CALCIUM SERPL-MCNC: 9.1 MG/DL (ref 8.7–10.5)
CHLORIDE SERPL-SCNC: 105 MMOL/L (ref 95–110)
CO2 SERPL-SCNC: 26 MMOL/L (ref 23–29)
CREAT SERPL-MCNC: 0.9 MG/DL (ref 0.5–1.4)
DIFFERENTIAL METHOD: ABNORMAL
EOSINOPHIL # BLD AUTO: 0 K/UL (ref 0–0.5)
EOSINOPHIL NFR BLD: 0.3 % (ref 0–8)
ERYTHROCYTE [DISTWIDTH] IN BLOOD BY AUTOMATED COUNT: 16.3 % (ref 11.5–14.5)
EST. GFR  (AFRICAN AMERICAN): >60 ML/MIN/1.73 M^2
EST. GFR  (NON AFRICAN AMERICAN): >60 ML/MIN/1.73 M^2
GLUCOSE SERPL-MCNC: 94 MG/DL (ref 70–110)
HCT VFR BLD AUTO: 42.1 % (ref 37–48.5)
HGB BLD-MCNC: 12.9 G/DL (ref 12–16)
IMM GRANULOCYTES # BLD AUTO: 0.08 K/UL (ref 0–0.04)
IMM GRANULOCYTES NFR BLD AUTO: 0.6 % (ref 0–0.5)
LYMPHOCYTES # BLD AUTO: 2 K/UL (ref 1–4.8)
LYMPHOCYTES NFR BLD: 14.2 % (ref 18–48)
MAGNESIUM SERPL-MCNC: 2 MG/DL (ref 1.6–2.6)
MCH RBC QN AUTO: 24.3 PG (ref 27–31)
MCHC RBC AUTO-ENTMCNC: 30.6 G/DL (ref 32–36)
MCV RBC AUTO: 79 FL (ref 82–98)
MONOCYTES # BLD AUTO: 0.8 K/UL (ref 0.3–1)
MONOCYTES NFR BLD: 5.7 % (ref 4–15)
NEUTROPHILS # BLD AUTO: 11 K/UL (ref 1.8–7.7)
NEUTROPHILS NFR BLD: 79.4 % (ref 38–73)
NRBC BLD-RTO: 0 /100 WBC
PLATELET # BLD AUTO: 301 K/UL (ref 150–350)
PMV BLD AUTO: 9.3 FL (ref 9.2–12.9)
POTASSIUM SERPL-SCNC: 3.9 MMOL/L (ref 3.5–5.1)
PROT SERPL-MCNC: 7.4 G/DL (ref 6–8.4)
RBC # BLD AUTO: 5.31 M/UL (ref 4–5.4)
SODIUM SERPL-SCNC: 141 MMOL/L (ref 136–145)
WBC # BLD AUTO: 13.84 K/UL (ref 3.9–12.7)

## 2019-11-04 PROCEDURE — 80197 ASSAY OF TACROLIMUS: CPT

## 2019-11-04 PROCEDURE — 83735 ASSAY OF MAGNESIUM: CPT

## 2019-11-04 PROCEDURE — 80053 COMPREHEN METABOLIC PANEL: CPT

## 2019-11-04 PROCEDURE — 85025 COMPLETE CBC W/AUTO DIFF WBC: CPT

## 2019-11-04 RX ORDER — ATORVASTATIN CALCIUM 40 MG/1
40 TABLET, FILM COATED ORAL NIGHTLY
Qty: 90 TABLET | Refills: 3 | Status: SHIPPED | OUTPATIENT
Start: 2019-11-04 | End: 2020-10-12

## 2019-11-05 ENCOUNTER — PATIENT MESSAGE (OUTPATIENT)
Dept: INTERNAL MEDICINE | Facility: CLINIC | Age: 54
End: 2019-11-05

## 2019-11-05 DIAGNOSIS — M25.612 DECREASED SHOULDER MOBILITY, LEFT: ICD-10-CM

## 2019-11-05 DIAGNOSIS — Z94.4 LIVER REPLACED BY TRANSPLANT: ICD-10-CM

## 2019-11-05 DIAGNOSIS — G62.9 NEUROPATHY: ICD-10-CM

## 2019-11-05 LAB — TACROLIMUS BLD-MCNC: 3.7 NG/ML (ref 5–15)

## 2019-11-06 DIAGNOSIS — K64.9 HEMORRHOIDS, UNSPECIFIED HEMORRHOID TYPE: Primary | ICD-10-CM

## 2019-11-06 DIAGNOSIS — Z94.4 LIVER REPLACED BY TRANSPLANT: ICD-10-CM

## 2019-11-06 RX ORDER — TACROLIMUS 1 MG/1
CAPSULE ORAL
Qty: 240 CAPSULE | Refills: 2 | Status: SHIPPED | OUTPATIENT
Start: 2019-11-06 | End: 2019-11-15

## 2019-11-06 RX ORDER — POTASSIUM CHLORIDE 750 MG/1
TABLET, EXTENDED RELEASE ORAL
Qty: 60 TABLET | Refills: 2 | Status: SHIPPED | OUTPATIENT
Start: 2019-11-06 | End: 2020-02-06

## 2019-11-06 RX ORDER — TACROLIMUS 1 MG/1
CAPSULE ORAL
Qty: 240 CAPSULE | Refills: 2 | Status: SHIPPED | OUTPATIENT
Start: 2019-11-06 | End: 2019-11-06

## 2019-11-06 RX ORDER — GABAPENTIN 300 MG/1
CAPSULE ORAL
Qty: 120 CAPSULE | Refills: 2 | Status: SHIPPED | OUTPATIENT
Start: 2019-11-06 | End: 2020-02-06

## 2019-11-06 RX ORDER — HYDROCORTISONE 25 MG/G
CREAM TOPICAL 2 TIMES DAILY
Qty: 1 TUBE | Refills: 1 | Status: SHIPPED | OUTPATIENT
Start: 2019-11-06 | End: 2019-11-13

## 2019-11-06 NOTE — TELEPHONE ENCOUNTER
Dr. Souza reviewed your labs.  Called patient to see if she missed any doses of her medication, left voicemail if she did not increase Tacrolimus to 5 mg in the morning and 4 mg in the evening.  Next labs are due on 11/12/19.

## 2019-11-06 NOTE — TELEPHONE ENCOUNTER
----- Message from Joselin Souza MD sent at 11/6/2019  1:30 PM CST -----  Tacrolimus level is low need to make sure patient is not missing doses.  Increase to 5 mg in the morning and 4 mg in the evening.  Repeat labs next week.

## 2019-11-08 ENCOUNTER — PATIENT MESSAGE (OUTPATIENT)
Dept: GASTROENTEROLOGY | Facility: CLINIC | Age: 54
End: 2019-11-08

## 2019-11-10 DIAGNOSIS — E55.9 VITAMIN D DEFICIENCY: ICD-10-CM

## 2019-11-11 ENCOUNTER — PATIENT MESSAGE (OUTPATIENT)
Dept: INTERNAL MEDICINE | Facility: CLINIC | Age: 54
End: 2019-11-11

## 2019-11-11 ENCOUNTER — TELEPHONE (OUTPATIENT)
Dept: TRANSPLANT | Facility: CLINIC | Age: 54
End: 2019-11-11

## 2019-11-11 RX ORDER — ERGOCALCIFEROL 1.25 MG/1
CAPSULE ORAL
Qty: 10 CAPSULE | Refills: 0 | OUTPATIENT
Start: 2019-11-11

## 2019-11-11 NOTE — TELEPHONE ENCOUNTER
[facility] Progress Note:    Patient: Thomas Gilmore Date: 2017   : 1987 Attending: Julian Rod MD         Admission date: 2017    ICU admit date:  2017   Intubation date:  NA    Chief Complaint: Abdominal mass.    Thomas Gilmore is a 30 year old male with a history significant for allergic rhinitis, asthma, benign prostatic hyperplasia, drug abuse, Tourette disorder who presents with LUQ abdominal pain with large mesenteric abdominal mass with small bowel perforation s/p exploratory laparotomy, extensive small bowel resection with removal of 25 cm mesenteric mass with primary ileocolonic anastomosis (, [facility]).    24h: No overnight events. Pain controlled currently.     Impression:  -- Large mesenteric abdominal mass with small bowel perforation s/p exploratory laparotomy, extensive small bowel resection with removal of 25 cm mesenteric mass with primary ileocolonic anastomosis (, [facility]).  -- Acute postoperative pain  -- Acute post-operative blood loss anemia  -- Hx chronic back pain  -- Hx tourette syndrome    ASSESSMENT/PLAN:   Neuro: Lethargic, wakes easily. MONAE, nonfocal exam. Reports abdominal pain with coughing. Hx of chronic pain, has followed with pain management as outpatient.   -- Dilaudid PCA   -- DC duragesic patch   -- Consult pain management; Dr. Angel  -- PT/OT - increase activity     Pulmonary: Saturating well on RA.  -- Pulmonary hygiene: IS, cough and deep breathe    CV: Normotensive - mild hypertension at times. NSR.   -- Labetalol and Hydralazine PRN to keep SBP less than 160.     Renal: Renal function preserved, SCr remains at baseline. Good UO. Hx of BPH maintained on Flomax.   -- LR at 100 mL/hr  -- [place] for accurate I/O - consider discontinuing in am    GI: Abdomen soft, tender at incision. No flatus. Denies n/v. No NG output.   -- NPO  -- NG to LIS  -- Surgery team following; Dr. Stephens    Heme: Postoperative blood  Called patient about her pain- now better-was a pain on left side from ear to back.  Pain is better.  Clarified the Prograf is now 5 mg every am and 4 mg every pm.     loss anemia, H/H essentially unchanged from yesterday. Thrombocyte count normal. No evidence of active bleeding. Abdominal mass excision, no known hx of malignancy.   -- SQH  -- Follow pathology  -- Oncology following    Endocrine: Mild hyperglycemia intrinsically controlled within ICU target range. No hx of DM.    ID: Afebrile. No leukocytosis. Perforated bowel requiring postoperative antibiotics.  -- Continue zosyn (12/20) and fluconazole (12/20).    Goals/Disposition: Maintain in ICU. Consider transfer out of ICU later today or tomorrow based on clinical status.     PERTINENT DIAGNOSTICS/PROCEDURES:  12/20 CT Chest/abdomen/pelvis reveals large, mixed density intraperitoneal mass measuring approximately 23 x 12 x 27 cm with prominent vasculature noted coursing through the mass, including apparent feeding vessel from the SMA, loop of small bowel which courses along the posterolateral left aspect of the mass, and appears at least partially encased within it with findings again concerning for fistulous tract extending through the mass and into a collection of multiple locules of air along the anterolateral lateral left aspect of the mass, large amount of free intraperitoneal air.    BEST PRACTICES:  - VTE prophylaxis: SCDs, UFH SC TID.  - SUP: H2RA  - LDA: CVC, david for accurate I&O   - Nutrition: NPO  - Therapy/mobilization: PT/OT, activities as tolerated  - Goals of care note documented: 12/20/2017     ================================================================    Subjective: States \"it hurts when I cough\". No other complaints at this time.      I/O last 3 completed shifts:  In: 4345 [P.O.:60; I.V.:4285]  Out: 1840 [Urine:1840]  I/O this shift:  In: -   Out: 185 [Urine:185]    Vital Last Value 24 Hour Range   Temperature 98.5 °F (36.9 °C) (12/21/17 0800) Temp  Min: 98.3 °F (36.8 °C)  Max: 99 °F (37.2 °C)   Pulse 98 (12/21/17 1000) Pulse  Min: 92  Max: 122   Respiratory 22 (12/21/17 1000) Resp  Min: 8  Max: 32    Non-Invasive  Blood Pressure (!) 144/95 (12/21/17 1000) BP  Min: 140/91  Max: 160/98   Pulse Oximetry 99 % (12/21/17 1000) SpO2  Min: 98 %  Max: 100 %   Arterial   Blood Pressure 157/89 (12/21/17 0700) Arterial Line BP  Min: 122/88  Max: 168/92        Physical Exam:  General appearance: Young male. Sitting in chair. NAD.   Neuro: Lethargic, wakes to voice. MONAE, strength 5/5 and symmetric throughout. Pupils miotic.   Neck: Supple. Trachea midline.   Chest: Respirations non-labored. Breath sounds clear with diminished bases.   Heart: RRR. Normal S1/S2, no M/G/R  Abdomen: Soft. Tender to incision. Hypoactive bowel tones. Incision with dressing dry and intact.   Extremities: Warm. No edema. Bilateral radial and dorsalis pedis pulses 2+.   Skin: Warm and dry. Incision as noted above.      Pertinent Reviewed: Allergies, Medications, Labs, Imaging and Physician and Nursing Notes    ACCS Attestation  This patient is critically ill as documented above. I evaluated the patient with Dr. Rod and reviewed imaging and laboratory data. Together, we formulated the diagnostic and therapeutic strategy documented above.  Critical care services I provided 00645 ([facility] hospital care, level III).    Julienne Tom PA-C  [facility]  [phone]

## 2019-11-12 ENCOUNTER — PATIENT MESSAGE (OUTPATIENT)
Dept: ORTHOPEDICS | Facility: CLINIC | Age: 54
End: 2019-11-12

## 2019-11-12 ENCOUNTER — LAB VISIT (OUTPATIENT)
Dept: LAB | Facility: HOSPITAL | Age: 54
End: 2019-11-12
Attending: INTERNAL MEDICINE
Payer: MEDICAID

## 2019-11-12 DIAGNOSIS — E83.42 HYPOMAGNESEMIA: ICD-10-CM

## 2019-11-12 DIAGNOSIS — Z94.4 LIVER REPLACED BY TRANSPLANT: ICD-10-CM

## 2019-11-12 DIAGNOSIS — M51.36 DEGENERATIVE DISC DISEASE, LUMBAR: ICD-10-CM

## 2019-11-12 LAB
ALBUMIN SERPL BCP-MCNC: 3.5 G/DL (ref 3.5–5.2)
ALP SERPL-CCNC: 195 U/L (ref 55–135)
ALT SERPL W/O P-5'-P-CCNC: 24 U/L (ref 10–44)
ANION GAP SERPL CALC-SCNC: 7 MMOL/L (ref 8–16)
AST SERPL-CCNC: 11 U/L (ref 10–40)
BASOPHILS # BLD AUTO: 0.04 K/UL (ref 0–0.2)
BASOPHILS NFR BLD: 0.3 % (ref 0–1.9)
BILIRUB SERPL-MCNC: 0.4 MG/DL (ref 0.1–1)
BUN SERPL-MCNC: 17 MG/DL (ref 6–20)
CALCIUM SERPL-MCNC: 9.5 MG/DL (ref 8.7–10.5)
CHLORIDE SERPL-SCNC: 108 MMOL/L (ref 95–110)
CO2 SERPL-SCNC: 26 MMOL/L (ref 23–29)
CREAT SERPL-MCNC: 0.9 MG/DL (ref 0.5–1.4)
DIFFERENTIAL METHOD: ABNORMAL
EOSINOPHIL # BLD AUTO: 0.1 K/UL (ref 0–0.5)
EOSINOPHIL NFR BLD: 0.6 % (ref 0–8)
ERYTHROCYTE [DISTWIDTH] IN BLOOD BY AUTOMATED COUNT: 17.2 % (ref 11.5–14.5)
EST. GFR  (AFRICAN AMERICAN): >60 ML/MIN/1.73 M^2
EST. GFR  (NON AFRICAN AMERICAN): >60 ML/MIN/1.73 M^2
GLUCOSE SERPL-MCNC: 92 MG/DL (ref 70–110)
HCT VFR BLD AUTO: 44 % (ref 37–48.5)
HGB BLD-MCNC: 13.5 G/DL (ref 12–16)
IMM GRANULOCYTES # BLD AUTO: 0.06 K/UL (ref 0–0.04)
IMM GRANULOCYTES NFR BLD AUTO: 0.4 % (ref 0–0.5)
LYMPHOCYTES # BLD AUTO: 2.5 K/UL (ref 1–4.8)
LYMPHOCYTES NFR BLD: 15.8 % (ref 18–48)
MAGNESIUM SERPL-MCNC: 1.8 MG/DL (ref 1.6–2.6)
MCH RBC QN AUTO: 24.6 PG (ref 27–31)
MCHC RBC AUTO-ENTMCNC: 30.7 G/DL (ref 32–36)
MCV RBC AUTO: 80 FL (ref 82–98)
MONOCYTES # BLD AUTO: 0.9 K/UL (ref 0.3–1)
MONOCYTES NFR BLD: 5.6 % (ref 4–15)
NEUTROPHILS # BLD AUTO: 12.2 K/UL (ref 1.8–7.7)
NEUTROPHILS NFR BLD: 77.3 % (ref 38–73)
NRBC BLD-RTO: 0 /100 WBC
PLATELET # BLD AUTO: 230 K/UL (ref 150–350)
PLATELET BLD QL SMEAR: ABNORMAL
PMV BLD AUTO: 10.8 FL (ref 9.2–12.9)
POTASSIUM SERPL-SCNC: 4.6 MMOL/L (ref 3.5–5.1)
PROT SERPL-MCNC: 7.5 G/DL (ref 6–8.4)
RBC # BLD AUTO: 5.48 M/UL (ref 4–5.4)
SODIUM SERPL-SCNC: 141 MMOL/L (ref 136–145)
WBC # BLD AUTO: 15.72 K/UL (ref 3.9–12.7)
WBC TOXIC VACUOLES BLD QL SMEAR: PRESENT

## 2019-11-12 PROCEDURE — 80053 COMPREHEN METABOLIC PANEL: CPT

## 2019-11-12 PROCEDURE — 83735 ASSAY OF MAGNESIUM: CPT

## 2019-11-12 PROCEDURE — 85025 COMPLETE CBC W/AUTO DIFF WBC: CPT

## 2019-11-12 PROCEDURE — 36415 COLL VENOUS BLD VENIPUNCTURE: CPT

## 2019-11-12 PROCEDURE — 80197 ASSAY OF TACROLIMUS: CPT

## 2019-11-12 RX ORDER — METHOCARBAMOL 750 MG/1
TABLET, FILM COATED ORAL
Qty: 60 TABLET | Refills: 0 | Status: CANCELLED | OUTPATIENT
Start: 2019-11-12

## 2019-11-13 LAB — TACROLIMUS BLD-MCNC: 12.8 NG/ML (ref 5–15)

## 2019-11-13 RX ORDER — GABAPENTIN 600 MG/1
TABLET ORAL
Qty: 90 TABLET | Refills: 3 | Status: SHIPPED | OUTPATIENT
Start: 2019-11-13 | End: 2020-02-06 | Stop reason: SDUPTHER

## 2019-11-14 RX ORDER — METHOCARBAMOL 750 MG/1
TABLET, FILM COATED ORAL
Qty: 60 TABLET | Refills: 0 | Status: SHIPPED | OUTPATIENT
Start: 2019-11-14 | End: 2020-04-16

## 2019-11-15 ENCOUNTER — PATIENT MESSAGE (OUTPATIENT)
Dept: GASTROENTEROLOGY | Facility: CLINIC | Age: 54
End: 2019-11-15

## 2019-11-15 ENCOUNTER — TELEPHONE (OUTPATIENT)
Dept: TRANSPLANT | Facility: CLINIC | Age: 54
End: 2019-11-15

## 2019-11-15 DIAGNOSIS — Z94.4 LIVER REPLACED BY TRANSPLANT: Primary | ICD-10-CM

## 2019-11-15 DIAGNOSIS — E83.42 HYPOMAGNESEMIA: ICD-10-CM

## 2019-11-15 DIAGNOSIS — Z94.4 LIVER REPLACED BY TRANSPLANT: ICD-10-CM

## 2019-11-15 NOTE — TELEPHONE ENCOUNTER
----- Message from Joselin Souza MD sent at 11/15/2019  2:55 PM CST -----  Tacrolimus level is elevated.  Discussed plan with coordinator already.  Patient will have repeat labs next week.  Uncertain white blood cell count is elevated.  Need to see if patient is on steroids or has received any steroid injections.

## 2019-11-15 NOTE — TELEPHONE ENCOUNTER
----- Message from Radha Schulz LPN sent at 11/15/2019 10:22 AM CST -----  Contact: self [phone]      ----- Message -----  From: Tiesha Garcia  Sent: 11/15/2019   9:52 AM CST  To: [facility] Staff    States that she is calling to speak to nurse. States that she is calling regarding her abnormal labs. States that she wants to know if she needs to keep taking the medication prograf. Please call back at [phone]//thank you acc

## 2019-11-15 NOTE — TELEPHONE ENCOUNTER
----- Message from Ragini Chavira sent at 11/15/2019  1:27 PM CST -----  Contact:   Pt  [phone]  Pt called stating that she is returning Cassidy phone call

## 2019-11-15 NOTE — TELEPHONE ENCOUNTER
Returned call spoke to Dr Souza she wants patient to reduce dose to 4 mg in the morning and 3 in the evening.

## 2019-11-15 NOTE — TELEPHONE ENCOUNTER
Sent patient message to see she had steroid injections or is taking steroids, since white blood cell count was high.

## 2019-11-18 RX ORDER — TACROLIMUS 1 MG/1
CAPSULE ORAL
Qty: 210 CAPSULE | Refills: 11 | Status: SHIPPED | OUTPATIENT
Start: 2019-11-18 | End: 2019-11-21

## 2019-11-19 ENCOUNTER — LAB VISIT (OUTPATIENT)
Dept: LAB | Facility: HOSPITAL | Age: 54
End: 2019-11-19
Attending: INTERNAL MEDICINE
Payer: MEDICAID

## 2019-11-19 DIAGNOSIS — Z94.4 LIVER REPLACED BY TRANSPLANT: ICD-10-CM

## 2019-11-19 DIAGNOSIS — E83.42 HYPOMAGNESEMIA: ICD-10-CM

## 2019-11-19 LAB
ALBUMIN SERPL BCP-MCNC: 3.5 G/DL (ref 3.5–5.2)
ALP SERPL-CCNC: 184 U/L (ref 55–135)
ALT SERPL W/O P-5'-P-CCNC: 11 U/L (ref 10–44)
ANION GAP SERPL CALC-SCNC: 8 MMOL/L (ref 8–16)
AST SERPL-CCNC: 9 U/L (ref 10–40)
BASOPHILS # BLD AUTO: 0.04 K/UL (ref 0–0.2)
BASOPHILS NFR BLD: 0.3 % (ref 0–1.9)
BILIRUB SERPL-MCNC: 0.4 MG/DL (ref 0.1–1)
BUN SERPL-MCNC: 14 MG/DL (ref 6–20)
CALCIUM SERPL-MCNC: 9.5 MG/DL (ref 8.7–10.5)
CHLORIDE SERPL-SCNC: 106 MMOL/L (ref 95–110)
CO2 SERPL-SCNC: 28 MMOL/L (ref 23–29)
CREAT SERPL-MCNC: 1.1 MG/DL (ref 0.5–1.4)
DIFFERENTIAL METHOD: ABNORMAL
EOSINOPHIL # BLD AUTO: 0.1 K/UL (ref 0–0.5)
EOSINOPHIL NFR BLD: 0.7 % (ref 0–8)
ERYTHROCYTE [DISTWIDTH] IN BLOOD BY AUTOMATED COUNT: 16.4 % (ref 11.5–14.5)
EST. GFR  (AFRICAN AMERICAN): >60 ML/MIN/1.73 M^2
EST. GFR  (NON AFRICAN AMERICAN): 56.3 ML/MIN/1.73 M^2
GLUCOSE SERPL-MCNC: 91 MG/DL (ref 70–110)
HCT VFR BLD AUTO: 42.9 % (ref 37–48.5)
HGB BLD-MCNC: 13 G/DL (ref 12–16)
IMM GRANULOCYTES # BLD AUTO: 0.05 K/UL (ref 0–0.04)
IMM GRANULOCYTES NFR BLD AUTO: 0.4 % (ref 0–0.5)
LYMPHOCYTES # BLD AUTO: 2.5 K/UL (ref 1–4.8)
LYMPHOCYTES NFR BLD: 19.7 % (ref 18–48)
MAGNESIUM SERPL-MCNC: 1.8 MG/DL (ref 1.6–2.6)
MCH RBC QN AUTO: 24.7 PG (ref 27–31)
MCHC RBC AUTO-ENTMCNC: 30.3 G/DL (ref 32–36)
MCV RBC AUTO: 82 FL (ref 82–98)
MONOCYTES # BLD AUTO: 0.6 K/UL (ref 0.3–1)
MONOCYTES NFR BLD: 5 % (ref 4–15)
NEUTROPHILS # BLD AUTO: 9.4 K/UL (ref 1.8–7.7)
NEUTROPHILS NFR BLD: 73.9 % (ref 38–73)
NRBC BLD-RTO: 0 /100 WBC
PLATELET # BLD AUTO: 79 K/UL (ref 150–350)
PMV BLD AUTO: 11.9 FL (ref 9.2–12.9)
POTASSIUM SERPL-SCNC: 4.4 MMOL/L (ref 3.5–5.1)
PROT SERPL-MCNC: 7.6 G/DL (ref 6–8.4)
RBC # BLD AUTO: 5.26 M/UL (ref 4–5.4)
SODIUM SERPL-SCNC: 142 MMOL/L (ref 136–145)
WBC # BLD AUTO: 12.7 K/UL (ref 3.9–12.7)

## 2019-11-19 PROCEDURE — 80197 ASSAY OF TACROLIMUS: CPT

## 2019-11-19 PROCEDURE — 80053 COMPREHEN METABOLIC PANEL: CPT

## 2019-11-19 PROCEDURE — 83735 ASSAY OF MAGNESIUM: CPT

## 2019-11-19 PROCEDURE — 85025 COMPLETE CBC W/AUTO DIFF WBC: CPT

## 2019-11-19 PROCEDURE — 36415 COLL VENOUS BLD VENIPUNCTURE: CPT

## 2019-11-20 LAB — TACROLIMUS BLD-MCNC: 13.1 NG/ML (ref 5–15)

## 2019-11-21 DIAGNOSIS — Z94.4 LIVER REPLACED BY TRANSPLANT: ICD-10-CM

## 2019-11-21 NOTE — TELEPHONE ENCOUNTER
----- Message from Joselin Souza MD sent at 11/20/2019  6:20 PM CST -----  Tacrolimus level is still high.  If this is a true trough decreased to 3 mg twice a day and repeat labs next week.

## 2019-11-21 NOTE — TELEPHONE ENCOUNTER
Dr. Souza reviewed your labs.  Called patient to let her know labs ok Prograf still high.  She will decrease Prograf to 3 mg bid and repeat labs on Monday.

## 2019-11-25 ENCOUNTER — PATIENT MESSAGE (OUTPATIENT)
Dept: GASTROENTEROLOGY | Facility: CLINIC | Age: 54
End: 2019-11-25

## 2019-11-25 RX ORDER — TACROLIMUS 1 MG/1
3 CAPSULE ORAL EVERY 12 HOURS
Qty: 180 CAPSULE | Refills: 11 | Status: SHIPPED | OUTPATIENT
Start: 2019-11-25 | End: 2019-11-29

## 2019-11-26 ENCOUNTER — PATIENT MESSAGE (OUTPATIENT)
Dept: GASTROENTEROLOGY | Facility: CLINIC | Age: 54
End: 2019-11-26

## 2019-11-27 ENCOUNTER — LAB VISIT (OUTPATIENT)
Dept: LAB | Facility: HOSPITAL | Age: 54
End: 2019-11-27
Attending: INTERNAL MEDICINE
Payer: MEDICAID

## 2019-11-27 DIAGNOSIS — E83.42 HYPOMAGNESEMIA: ICD-10-CM

## 2019-11-27 DIAGNOSIS — Z94.4 LIVER REPLACED BY TRANSPLANT: ICD-10-CM

## 2019-11-27 LAB
ALBUMIN SERPL BCP-MCNC: 3.6 G/DL (ref 3.5–5.2)
ALP SERPL-CCNC: 195 U/L (ref 55–135)
ALT SERPL W/O P-5'-P-CCNC: 12 U/L (ref 10–44)
ANION GAP SERPL CALC-SCNC: 11 MMOL/L (ref 8–16)
AST SERPL-CCNC: 10 U/L (ref 10–40)
BASOPHILS # BLD AUTO: 0.04 K/UL (ref 0–0.2)
BASOPHILS NFR BLD: 0.3 % (ref 0–1.9)
BILIRUB SERPL-MCNC: 0.6 MG/DL (ref 0.1–1)
BUN SERPL-MCNC: 12 MG/DL (ref 6–20)
CALCIUM SERPL-MCNC: 9.5 MG/DL (ref 8.7–10.5)
CHLORIDE SERPL-SCNC: 102 MMOL/L (ref 95–110)
CO2 SERPL-SCNC: 27 MMOL/L (ref 23–29)
CREAT SERPL-MCNC: 1.1 MG/DL (ref 0.5–1.4)
DIFFERENTIAL METHOD: ABNORMAL
EOSINOPHIL # BLD AUTO: 0.1 K/UL (ref 0–0.5)
EOSINOPHIL NFR BLD: 0.8 % (ref 0–8)
ERYTHROCYTE [DISTWIDTH] IN BLOOD BY AUTOMATED COUNT: 16 % (ref 11.5–14.5)
EST. GFR  (AFRICAN AMERICAN): >60 ML/MIN/1.73 M^2
EST. GFR  (NON AFRICAN AMERICAN): 56.3 ML/MIN/1.73 M^2
GLUCOSE SERPL-MCNC: 100 MG/DL (ref 70–110)
HCT VFR BLD AUTO: 43 % (ref 37–48.5)
HGB BLD-MCNC: 13.2 G/DL (ref 12–16)
IMM GRANULOCYTES # BLD AUTO: 0.08 K/UL (ref 0–0.04)
IMM GRANULOCYTES NFR BLD AUTO: 0.6 % (ref 0–0.5)
LYMPHOCYTES # BLD AUTO: 2.9 K/UL (ref 1–4.8)
LYMPHOCYTES NFR BLD: 19.8 % (ref 18–48)
MAGNESIUM SERPL-MCNC: 1.5 MG/DL (ref 1.6–2.6)
MCH RBC QN AUTO: 24.6 PG (ref 27–31)
MCHC RBC AUTO-ENTMCNC: 30.7 G/DL (ref 32–36)
MCV RBC AUTO: 80 FL (ref 82–98)
MONOCYTES # BLD AUTO: 0.8 K/UL (ref 0.3–1)
MONOCYTES NFR BLD: 5.8 % (ref 4–15)
NEUTROPHILS # BLD AUTO: 10.5 K/UL (ref 1.8–7.7)
NEUTROPHILS NFR BLD: 72.7 % (ref 38–73)
NRBC BLD-RTO: 0 /100 WBC
PLATELET # BLD AUTO: 316 K/UL (ref 150–350)
PMV BLD AUTO: 10.1 FL (ref 9.2–12.9)
POTASSIUM SERPL-SCNC: 3.7 MMOL/L (ref 3.5–5.1)
PROT SERPL-MCNC: 7.9 G/DL (ref 6–8.4)
RBC # BLD AUTO: 5.36 M/UL (ref 4–5.4)
SODIUM SERPL-SCNC: 140 MMOL/L (ref 136–145)
WBC # BLD AUTO: 14.41 K/UL (ref 3.9–12.7)

## 2019-11-27 PROCEDURE — 36415 COLL VENOUS BLD VENIPUNCTURE: CPT

## 2019-11-27 PROCEDURE — 85025 COMPLETE CBC W/AUTO DIFF WBC: CPT

## 2019-11-27 PROCEDURE — 83735 ASSAY OF MAGNESIUM: CPT

## 2019-11-27 PROCEDURE — 80053 COMPREHEN METABOLIC PANEL: CPT

## 2019-11-27 PROCEDURE — 80197 ASSAY OF TACROLIMUS: CPT

## 2019-11-28 LAB — TACROLIMUS BLD-MCNC: 13.7 NG/ML (ref 5–15)

## 2019-11-29 ENCOUNTER — PATIENT MESSAGE (OUTPATIENT)
Dept: INTERNAL MEDICINE | Facility: CLINIC | Age: 54
End: 2019-11-29

## 2019-11-29 DIAGNOSIS — Z94.4 LIVER REPLACED BY TRANSPLANT: ICD-10-CM

## 2019-11-29 DIAGNOSIS — E55.9 VITAMIN D DEFICIENCY: ICD-10-CM

## 2019-11-29 RX ORDER — TACROLIMUS 1 MG/1
2 CAPSULE ORAL EVERY 12 HOURS
Qty: 120 CAPSULE | Refills: 11 | Status: SHIPPED | OUTPATIENT
Start: 2019-11-29 | End: 2020-01-31

## 2019-11-29 NOTE — TELEPHONE ENCOUNTER
Notified patient via phone  And portal that this is a follow up to your recent lab test.  Dr. Souza reviewed your lab and it is stable.  There are  medication changes at this time. Decrease Prograf to 2 mg by mouth every 12 hours  Please repeat labs 12/2/19  Thank you  Judie Vallecillo, MSN, RNBC  Liver Transplant Coordinator

## 2019-11-29 NOTE — TELEPHONE ENCOUNTER
----- Message from Joselin Souza MD sent at 11/29/2019  1:29 PM CST -----  Not sure why tacrolimus level is remaining high despite decreases and immunosuppression.  Decreased tacrolimus to 2 mg twice a day repeat labs next week.

## 2019-12-02 ENCOUNTER — LAB VISIT (OUTPATIENT)
Dept: LAB | Facility: HOSPITAL | Age: 54
End: 2019-12-02
Attending: INTERNAL MEDICINE
Payer: MEDICAID

## 2019-12-02 DIAGNOSIS — E83.42 HYPOMAGNESEMIA: ICD-10-CM

## 2019-12-02 DIAGNOSIS — Z94.4 LIVER REPLACED BY TRANSPLANT: ICD-10-CM

## 2019-12-02 LAB
ALBUMIN SERPL BCP-MCNC: 3.5 G/DL (ref 3.5–5.2)
ALP SERPL-CCNC: 170 U/L (ref 55–135)
ALT SERPL W/O P-5'-P-CCNC: 10 U/L (ref 10–44)
ANION GAP SERPL CALC-SCNC: 10 MMOL/L (ref 8–16)
AST SERPL-CCNC: 9 U/L (ref 10–40)
BASOPHILS # BLD AUTO: 0.03 K/UL (ref 0–0.2)
BASOPHILS NFR BLD: 0.2 % (ref 0–1.9)
BILIRUB SERPL-MCNC: 0.5 MG/DL (ref 0.1–1)
BUN SERPL-MCNC: 12 MG/DL (ref 6–20)
CALCIUM SERPL-MCNC: 9.5 MG/DL (ref 8.7–10.5)
CHLORIDE SERPL-SCNC: 104 MMOL/L (ref 95–110)
CO2 SERPL-SCNC: 28 MMOL/L (ref 23–29)
CREAT SERPL-MCNC: 0.9 MG/DL (ref 0.5–1.4)
DIFFERENTIAL METHOD: ABNORMAL
EOSINOPHIL # BLD AUTO: 0.1 K/UL (ref 0–0.5)
EOSINOPHIL NFR BLD: 0.6 % (ref 0–8)
ERYTHROCYTE [DISTWIDTH] IN BLOOD BY AUTOMATED COUNT: 16.6 % (ref 11.5–14.5)
EST. GFR  (AFRICAN AMERICAN): >60 ML/MIN/1.73 M^2
EST. GFR  (NON AFRICAN AMERICAN): >60 ML/MIN/1.73 M^2
GLUCOSE SERPL-MCNC: 92 MG/DL (ref 70–110)
HCT VFR BLD AUTO: 42.6 % (ref 37–48.5)
HGB BLD-MCNC: 12.8 G/DL (ref 12–16)
IMM GRANULOCYTES # BLD AUTO: 0.08 K/UL (ref 0–0.04)
IMM GRANULOCYTES NFR BLD AUTO: 0.6 % (ref 0–0.5)
LYMPHOCYTES # BLD AUTO: 2.6 K/UL (ref 1–4.8)
LYMPHOCYTES NFR BLD: 18.9 % (ref 18–48)
MAGNESIUM SERPL-MCNC: 1.6 MG/DL (ref 1.6–2.6)
MCH RBC QN AUTO: 24.4 PG (ref 27–31)
MCHC RBC AUTO-ENTMCNC: 30 G/DL (ref 32–36)
MCV RBC AUTO: 81 FL (ref 82–98)
MONOCYTES # BLD AUTO: 0.8 K/UL (ref 0.3–1)
MONOCYTES NFR BLD: 5.6 % (ref 4–15)
NEUTROPHILS # BLD AUTO: 10 K/UL (ref 1.8–7.7)
NEUTROPHILS NFR BLD: 74.1 % (ref 38–73)
NRBC BLD-RTO: 0 /100 WBC
PLATELET # BLD AUTO: 314 K/UL (ref 150–350)
PMV BLD AUTO: 10.4 FL (ref 9.2–12.9)
POTASSIUM SERPL-SCNC: 4.1 MMOL/L (ref 3.5–5.1)
PROT SERPL-MCNC: 7.6 G/DL (ref 6–8.4)
RBC # BLD AUTO: 5.24 M/UL (ref 4–5.4)
SODIUM SERPL-SCNC: 142 MMOL/L (ref 136–145)
WBC # BLD AUTO: 13.51 K/UL (ref 3.9–12.7)

## 2019-12-02 PROCEDURE — 83735 ASSAY OF MAGNESIUM: CPT

## 2019-12-02 PROCEDURE — 80197 ASSAY OF TACROLIMUS: CPT

## 2019-12-02 PROCEDURE — 36415 COLL VENOUS BLD VENIPUNCTURE: CPT

## 2019-12-02 PROCEDURE — 85025 COMPLETE CBC W/AUTO DIFF WBC: CPT

## 2019-12-02 PROCEDURE — 80053 COMPREHEN METABOLIC PANEL: CPT

## 2019-12-02 RX ORDER — ERGOCALCIFEROL 1.25 MG/1
50000 CAPSULE ORAL
Qty: 12 CAPSULE | Refills: 0 | Status: SHIPPED | OUTPATIENT
Start: 2019-12-02 | End: 2020-02-04

## 2019-12-02 RX ORDER — LEVOCETIRIZINE DIHYDROCHLORIDE 5 MG/1
TABLET, FILM COATED ORAL
Qty: 30 TABLET | Refills: 0 | Status: SHIPPED | OUTPATIENT
Start: 2019-12-02 | End: 2019-12-31

## 2019-12-03 DIAGNOSIS — Z29.89 PROPHYLACTIC IMMUNOTHERAPY: ICD-10-CM

## 2019-12-03 DIAGNOSIS — R60.0 BILATERAL LEG EDEMA: ICD-10-CM

## 2019-12-03 LAB — TACROLIMUS BLD-MCNC: 8 NG/ML (ref 5–15)

## 2019-12-04 RX ORDER — FUROSEMIDE 40 MG/1
TABLET ORAL
Qty: 30 TABLET | Refills: 0 | Status: SHIPPED | OUTPATIENT
Start: 2019-12-04 | End: 2020-01-03

## 2019-12-04 NOTE — TELEPHONE ENCOUNTER
Returned called advised patient that I did not call her yesterday, it was Internal Medicine that called her.  She did report being fatigued, and not being to complete task.  Like she is short of breath.  I advised her to reach out to her PCP, or if she is having acute symptoms go the the ER.  She agreed with the plan.

## 2019-12-04 NOTE — TELEPHONE ENCOUNTER
----- Message from Christina Ro sent at 12/4/2019 12:25 PM CST -----  Contact: Marcie  Pt stated she was returning your call from yesterday. Pt contact number is [phone].

## 2019-12-06 ENCOUNTER — TELEPHONE (OUTPATIENT)
Dept: TRANSPLANT | Facility: CLINIC | Age: 54
End: 2019-12-06

## 2019-12-06 ENCOUNTER — PATIENT MESSAGE (OUTPATIENT)
Dept: GASTROENTEROLOGY | Facility: CLINIC | Age: 54
End: 2019-12-06

## 2019-12-06 NOTE — TELEPHONE ENCOUNTER
Dr. Souza reviewed your labs.  Called patient to see if she was still on steroids, patient states she has been off for a long time.  Will put in referral for Hematology in [place], closer to patient's home.  Letter sent for next lab appointment 01/27/20

## 2019-12-06 NOTE — TELEPHONE ENCOUNTER
----- Message from Joselin Souza MD sent at 12/5/2019  8:37 PM CST -----  Liver tests are okay.  Patient keeps slightly elevated white blood cell count without clear etiology.  Need to see if patient is still taking any steroids or Medrol Dosepak.  If she has been off of steroids then recommend hematology follow-up.  Repeat labs per routine.

## 2019-12-09 ENCOUNTER — OFFICE VISIT (OUTPATIENT)
Dept: SURGERY | Facility: CLINIC | Age: 54
End: 2019-12-09
Payer: MEDICAID

## 2019-12-09 VITALS
TEMPERATURE: 98 F | HEART RATE: 97 BPM | DIASTOLIC BLOOD PRESSURE: 90 MMHG | BODY MASS INDEX: 34.91 KG/M2 | SYSTOLIC BLOOD PRESSURE: 136 MMHG | WEIGHT: 197.06 LBS

## 2019-12-09 DIAGNOSIS — K64.3 GRADE IV HEMORRHOIDS: Primary | ICD-10-CM

## 2019-12-09 DIAGNOSIS — K62.5 RECTAL BLEED: ICD-10-CM

## 2019-12-09 PROCEDURE — 99214 OFFICE O/P EST MOD 30 MIN: CPT | Mod: S$PBB,,, | Performed by: COLON & RECTAL SURGERY

## 2019-12-09 PROCEDURE — 99214 PR OFFICE/OUTPT VISIT, EST, LEVL IV, 30-39 MIN: ICD-10-PCS | Mod: S$PBB,,, | Performed by: COLON & RECTAL SURGERY

## 2019-12-09 PROCEDURE — 99999 PR PBB SHADOW E&M-EST. PATIENT-LVL III: ICD-10-PCS | Mod: PBBFAC,,, | Performed by: COLON & RECTAL SURGERY

## 2019-12-09 PROCEDURE — 99213 OFFICE O/P EST LOW 20 MIN: CPT | Mod: PBBFAC | Performed by: COLON & RECTAL SURGERY

## 2019-12-09 PROCEDURE — 99999 PR PBB SHADOW E&M-EST. PATIENT-LVL III: CPT | Mod: PBBFAC,,, | Performed by: COLON & RECTAL SURGERY

## 2019-12-09 RX ORDER — TRAMADOL HYDROCHLORIDE 50 MG/1
TABLET ORAL
Refills: 0 | COMMUNITY
Start: 2019-12-02 | End: 2020-01-24 | Stop reason: SDUPTHER

## 2019-12-09 RX ORDER — LIDOCAINE HYDROCHLORIDE 10 MG/ML
1 INJECTION, SOLUTION EPIDURAL; INFILTRATION; INTRACAUDAL; PERINEURAL ONCE
Status: DISCONTINUED | OUTPATIENT
Start: 2019-12-09 | End: 2020-02-12

## 2019-12-09 RX ORDER — ONDANSETRON 2 MG/ML
4 INJECTION INTRAMUSCULAR; INTRAVENOUS EVERY 12 HOURS PRN
Status: CANCELLED | OUTPATIENT
Start: 2019-12-09

## 2019-12-09 RX ORDER — BACLOFEN 10 MG/1
TABLET ORAL
Refills: 0 | COMMUNITY
Start: 2019-12-02 | End: 2020-01-24 | Stop reason: SDUPTHER

## 2019-12-09 RX ORDER — METRONIDAZOLE 500 MG/100ML
500 INJECTION, SOLUTION INTRAVENOUS
Status: CANCELLED | OUTPATIENT
Start: 2019-12-09

## 2019-12-09 RX ORDER — SODIUM CHLORIDE 9 MG/ML
INJECTION, SOLUTION INTRAVENOUS CONTINUOUS
Status: CANCELLED | OUTPATIENT
Start: 2019-12-09

## 2019-12-09 RX ORDER — MYCOPHENOLATE MOFETIL 250 MG/1
CAPSULE ORAL
Refills: 11 | COMMUNITY
Start: 2019-12-05 | End: 2020-03-06

## 2019-12-09 NOTE — Clinical Note
This is a transplant patient that has had rectal bleeding x2yrs since txp. Looks like a prolapsed hemorrhoid but has polypoid features that are odd so likely will excise soon to confirm benign tissue. Just want to run it by you first to make sure you are okay with us proceeding. She's on prograf only it looks like for immunosuppresion.

## 2019-12-09 NOTE — PROGRESS NOTES
History & Physical    SUBJECTIVE:     Chief Complaint   Patient presents with    Hemorrhoids   Ref: DEMETRIUS Hodges    History of Present Illness:  Patient is a 54 y.o. female presents for evaluation of rectal bleeding and possible hemorrhoids.  Patient has had a liver transplant in December 2017 and has done well since that time.  However, after surgery she has noticed that she has bleeding with each bowel movement that occurs when wiping after bowel movements.  This bleeding is never painful.  She states the bleeding occurs each time she has a bowel movement around 4 times per day.  It has been consistent for 2 years now.  She states that she has a bowel movement 4 times per day, it is never hard, it is usually soft an loose, she takes no stool softeners and fiber supplementation.  She does drink at least 64 oz of water per day, does not have to strain to have bowel movement spends less than 5 min on the toilet per bowel movement.  She did undergo colonoscopy in December 2017 were no lesions were found but hemorrhoids were seen on exam.  She is on Prograf for immunosuppression for her transplant.      Review of patient's allergies indicates:   Allergen Reactions    Ferrous sulfate Other (See Comments)     Patient states the pill makes her sick. She stated she would rather have a shot       Current Outpatient Medications   Medication Sig Dispense Refill    atorvastatin (LIPITOR) 40 MG tablet Take 1 tablet (40 mg total) by mouth every evening. 90 tablet 3    azelastine (ASTELIN) 137 mcg (0.1 %) nasal spray 2 sprays (274 mcg total) by Nasal route 2 (two) times daily as needed for Rhinitis. 30 mL 5    BIOTIN ORAL Take by mouth once daily.       cyclobenzaprine (FLEXERIL) 10 MG tablet TAKE 1/2 TO 1 TABLET BY MOUTH EVERY NIGHT AT BEDTIME AS NEEDED FOR MUSCLE SPASMS. MAY CAUSE DROWSINESS 30 tablet 0    diclofenac sodium (VOLTAREN) 1 % Gel Apply 2 g topically 3 (three) times daily as needed. 2 Tube 6     ergocalciferol (ERGOCALCIFEROL) 50,000 unit Cap Take 1 capsule (50,000 Units total) by mouth every 7 days. 12 capsule 0    famotidine (PEPCID) 20 MG tablet Take 1 tablet (20 mg total) by mouth 2 (two) times daily. 60 tablet 5    furosemide (LASIX) 40 MG tablet TAKE 1 TABLET(40 MG) BY MOUTH EVERY DAY 30 tablet 0    gabapentin (NEURONTIN) 300 MG capsule TAKE 2 CAPSULES(600 MG) BY MOUTH TWICE DAILY 120 capsule 2    gabapentin (NEURONTIN) 600 MG tablet TAKE 1 AND 1/2 TABLETS BY MOUTH THREE TIMES DAILY. MAY CAUSE DROWSINESS 90 tablet 3    gabapentin (NEURONTIN) 600 MG tablet TAKE 1 AND 1/2 TABLETS BY MOUTH THREE TIMES DAILY. MAY CAUSE DROWSINESS 90 tablet 3    levocetirizine (XYZAL) 5 MG tablet TAKE 1 TABLET(5 MG) BY MOUTH EVERY EVENING 30 tablet 0    magnesium oxide (MAG-OX) 400 mg (241.3 mg magnesium) tablet Take 2 tablets (800 mg total) by mouth 2 (two) times daily. 120 tablet 6    methocarbamol (ROBAXIN) 750 MG Tab TK 1 T PO BID PRF MUSCLE SPASMS.  0    methocarbamol (ROBAXIN) 750 MG Tab TAKE 1 TABLET BY MOUTH TWICE DAILY AS NEEDED FOR MUSCLE SPASMS. 60 tablet 0    NIFEdipine (PROCARDIA-XL) 30 MG (OSM) 24 hr tablet Take 1 tablet (30 mg total) by mouth once daily. 90 tablet 3    potassium chloride (KLOR-CON) 10 MEQ TbSR TAKE 2 TABLETS(20 MEQ) BY MOUTH EVERY DAY 60 tablet 2    tacrolimus (PROGRAF) 1 MG Cap Take 2 capsules (2 mg total) by mouth every 12 (twelve) hours. 120 capsule 11    sodium phosphates (FLEET ENEMA) 19-7 gram/118 mL Enem Place 2 enemas rectally once. Use on the morning of surgery prior to coming to hospital, do enemas 30min apart for 1 dose 2 enema 0    traZODone (DESYREL) 50 MG tablet Take 0.5 tablets (25 mg total) by mouth every evening. 15 tablet 11     Current Facility-Administered Medications   Medication Dose Route Frequency Provider Last Rate Last Dose    lidocaine (PF) 10 mg/ml (1%) injection 10 mg  1 mL Intradermal Once Alden Horowitz MD           Past Medical History:    Diagnosis Date    Alcohol abuse     Alcoholic hepatitis     Anemia of chronic disease     Arthritis     Cancer     Coagulopathy     Encounter for blood transfusion     End stage liver disease     History of alcohol abuse     Hyperlipidemia     Hypertension     Hyponatremia     Liver cirrhosis     Liver transplant candidate 12/26/2017    Obesity (BMI 30-39.9) 1/5/2016     Past Surgical History:   Procedure Laterality Date    BREAST BIOPSY Left     benign    CHOLECYSTECTOMY      COLONOSCOPY N/A 12/1/2017    Procedure: COLONOSCOPY;  Surgeon: Filemon Fuentes MD;  Location: [facility] ENDO (2ND FLR);  Service: Endoscopy;  Laterality: N/A;    COLONOSCOPY N/A 12/5/2017    Procedure: COLONOSCOPY;  Surgeon: José Chavira MD;  Location: [facility] ENDO (2ND FLR);  Service: Endoscopy;  Laterality: N/A;    HYSTERECTOMY      complete     INJECTION OF ANESTHETIC AGENT INTO SACROILIAC JOINT Right 6/14/2019    Procedure: right Sacroiliac Joint Injection;  Surgeon: David Desai MD;  Location: [facility] PAIN MGT;  Service: Pain Management;  Laterality: Right;    INJECTION OF PIRIFORMIS MUSCLE Right 6/14/2019    Procedure: Right piriformis injection;  Surgeon: David Desai MD;  Location: [facility] PAIN MGT;  Service: Pain Management;  Laterality: Right;    LIVER TRANSPLANT  12/2017     Family History   Problem Relation Age of Onset    Hypertension Mother     Alzheimer's disease Mother     Hypertension Father     Diabetes Father     Diabetes Sister     Depression Maternal Grandfather     Breast cancer Paternal Aunt      Social History     Tobacco Use    Smoking status: Never Smoker    Smokeless tobacco: Never Used   Substance Use Topics    Alcohol use: No     Frequency: Never     Comment: Currently admitted to inpatient rehab 7/2017    Drug use: No        Review of Systems:  Review of Systems   Constitutional: Negative for activity change, appetite change, chills, fatigue, fever and unexpected weight change.    HENT: Negative for congestion, ear pain, sore throat and trouble swallowing.    Eyes: Negative for pain, redness and itching.   Respiratory: Negative for cough, shortness of breath and wheezing.    Cardiovascular: Negative for chest pain, palpitations and leg swelling.   Gastrointestinal: Positive for anal bleeding. Negative for abdominal distention, abdominal pain, constipation, diarrhea, nausea, rectal pain and vomiting.   Endocrine: Negative for cold intolerance, heat intolerance and polyuria.   Genitourinary: Negative for dysuria, flank pain, frequency and hematuria.   Musculoskeletal: Negative for gait problem, joint swelling and neck pain.   Skin: Negative for color change, rash and wound.   Allergic/Immunologic: Positive for immunocompromised state. Negative for environmental allergies.   Neurological: Negative for dizziness, speech difficulty, weakness and numbness.   Psychiatric/Behavioral: Negative for agitation, confusion and hallucinations.       OBJECTIVE:     Vital Signs (Most Recent)  Temp: 98.1 °F (36.7 °C) (12/09/19 0949)  Pulse: 97 (12/09/19 0949)  BP: (!) 136/90 (12/09/19 0949)     89.4 kg (197 lb 1.5 oz)     Physical Exam:  Physical Exam   Constitutional: She is oriented to person, place, and time. She appears well-developed.   HENT:   Head: Normocephalic and atraumatic.   Eyes: Conjunctivae and EOM are normal.   Neck: Normal range of motion. No thyromegaly present.   Cardiovascular: Normal rate and regular rhythm.   Pulmonary/Chest: Effort normal. No respiratory distress.   Abdominal: Soft. She exhibits no distension and no mass. There is no tenderness.   Genitourinary:   Genitourinary Comments: Anorectal: +prolapsed tissues consistent with Grade IV right lateral hemorrhoids with polypoid features which are nontender and have evidence of recent bleeding; MIGUE with good tone, no blood, no palpable masses or defects   Musculoskeletal: Normal range of motion. She exhibits no edema or tenderness.    Neurological: She is alert and oriented to person, place, and time.   Skin: Skin is warm and dry. Capillary refill takes less than 2 seconds. No rash noted.   Psychiatric: She has a normal mood and affect.     Anoscopy Procedure Note    Pre-procedure diagnosis: Rectal bleeding    Post-procedure diagnosis: Internal hemorrhoids    Procedure: Anoscopy    Surgeon: Alden Horowitz MD    Assistant: Huma Black LPN    Specimen: none    Findings:  Anoscope inserted all 4 quadrants examined. Tissue along the right lateral border consistent with a prolapsed internal hemorrhoid with evidence of polypoid prolapse changes and evidence of recent bleeding. Mild enlargement of the left lateral, right anterior and right posterior internal hemorrhoidal columns.  No evidence of recent bleeding from these columns.  No other abnormalities or defects noted.  Stool present within the ball limiting visualization.    Patient tolerated procedure well.      Laboratory  Lab Results   Component Value Date    WBC 13.51 (H) 12/02/2019    HGB 12.8 12/02/2019    HCT 42.6 12/02/2019     12/02/2019    CHOL 214 (H) 11/04/2019    TRIG 78 11/04/2019    HDL 55 11/04/2019    ALT 10 12/02/2019    AST 9 (L) 12/02/2019     12/02/2019    K 4.1 12/02/2019     12/02/2019    CREATININE 0.9 12/02/2019    BUN 12 12/02/2019    CO2 28 12/02/2019    TSH 1.051 09/19/2018    INR 1.0 03/09/2018    HGBA1C 4.2 03/01/2018         Diagnostic Results:  Reviewed previous colonoscopy report showing no polyps found and hemorrhoids seen.      ASSESSMENT/PLAN:     54-year-old female with a 2 year history of bleeding after bowel movements when wiping with exam consistent today with possible prolapsed internal hemorrhoid versus abnormal growth    - given the fact that she is on immunosuppression this is occurred since her surgery, along with the abnormal appearance of the lesion that is prolapsing, discussed with the patient that we should excise this lesion  to ensure that this is not a cancerous or precancerous growth, but did discuss that this is most likely a prolapsed hemorrhoid.  Either way, excising this will relieve her problem of bleeding after bowel movements.  - All risks, benefits and alternatives fully explained to patient.  Risks include, but are not limited to, bleeding, infection, fecal incontinence, damage to the sphincter muscles, postoperative abscess, postoperative pain, urinary incontinence, urinary retention, perioperative MI, CVA and death.  All questions appropriately answered to patient's satisfaction.  Consent signed and placed on chart.  - 2 enemas the morning of surgery  - Will discuss preop with transplant hepatology to confirm okay to proceed (CC: Dr Souza)    Alden Horowitz MD  Colon and Rectal Surgery  [facility]

## 2019-12-09 NOTE — LETTER
December 9, 2019      Jenny Suggs PA-C  [address]  [place] [address]            [facility]  [address] , [address]  [place] [address]  Phone: [phone]  Fax: [phone]          Patient: Marcie Bazan   MR Number: [id number]   YOB: 1965   Date of Visit: 12/9/2019       Dear Jenny Suggs:    Thank you for referring Marcie Bazan to me for evaluation. Attached you will find relevant portions of my assessment and plan of care.    If you have questions, please do not hesitate to call me. I look forward to following Marcie Bazan along with you.    Sincerely,    Alden Horowitz MD    Enclosure  CC:  No Recipients    If you would like to receive this communication electronically, please contact [email or web address] or [phone] to request more information on [employer] access.    For providers and/or their staff who would like to refer a patient to [facility], please contact us through our one-stop-shop provider referral line, [facility], at [phone].    If you feel you have received this communication in error or would no longer like to receive these types of communications, please e-mail [email or web address]

## 2019-12-16 ENCOUNTER — TELEPHONE (OUTPATIENT)
Dept: TRANSPLANT | Facility: CLINIC | Age: 54
End: 2019-12-16

## 2019-12-16 ENCOUNTER — PATIENT MESSAGE (OUTPATIENT)
Dept: INTERNAL MEDICINE | Facility: CLINIC | Age: 54
End: 2019-12-16

## 2019-12-16 DIAGNOSIS — Z01.818 PREOP TESTING: Primary | ICD-10-CM

## 2019-12-16 NOTE — TELEPHONE ENCOUNTER
Returned call patient complaining of feeling nauseated and throwing up.  I advised her to see GI or her PCP.  I would not want to just say it was the Potassium when she has taken it in the past with no issues.  She agreed with the plan.

## 2019-12-16 NOTE — TELEPHONE ENCOUNTER
----- Message from Denis Carrasquillo sent at 12/16/2019  1:16 PM CST -----  Contact: Marcie  Ms. Bazan would like for Cassidy to contact her in regards to the potassium pills making her nausea. She can be reached at [phone].

## 2019-12-17 ENCOUNTER — ANESTHESIA EVENT (OUTPATIENT)
Dept: SURGERY | Facility: HOSPITAL | Age: 54
End: 2019-12-17
Payer: MEDICAID

## 2019-12-17 ENCOUNTER — TELEPHONE (OUTPATIENT)
Dept: TRANSPLANT | Facility: CLINIC | Age: 54
End: 2019-12-17

## 2019-12-17 ENCOUNTER — ANESTHESIA (OUTPATIENT)
Dept: SURGERY | Facility: HOSPITAL | Age: 54
End: 2019-12-17
Payer: MEDICAID

## 2019-12-23 ENCOUNTER — TELEPHONE (OUTPATIENT)
Dept: INTERNAL MEDICINE | Facility: CLINIC | Age: 54
End: 2019-12-23

## 2019-12-23 ENCOUNTER — PATIENT MESSAGE (OUTPATIENT)
Dept: INTERNAL MEDICINE | Facility: CLINIC | Age: 54
End: 2019-12-23

## 2019-12-23 ENCOUNTER — TELEPHONE (OUTPATIENT)
Dept: SURGERY | Facility: CLINIC | Age: 54
End: 2019-12-23

## 2019-12-23 ENCOUNTER — TELEPHONE (OUTPATIENT)
Dept: TRANSPLANT | Facility: CLINIC | Age: 54
End: 2019-12-23

## 2019-12-23 DIAGNOSIS — Z01.818 PRE-OP TESTING: Primary | ICD-10-CM

## 2019-12-23 NOTE — TELEPHONE ENCOUNTER
----- Message from Alden Horowitz MD sent at 12/23/2019  7:23 AM CST -----  See if she'd like to do 12/31?    Thanks,  VALENTIN    ----- Message -----  From: Jaylene Schneider MA  Sent: 12/18/2019  11:39 AM CST  To: Alden Horowitz MD    Patient states that she was sick and was not able to do her surgery on Tuesday 12/17/19. She would like another surgery date after 12/28/19. Please advise

## 2019-12-23 NOTE — TELEPHONE ENCOUNTER
----- Message from Alyssa Paige sent at 12/23/2019 12:09 PM CST -----  Contact: pt   She's calling in regards to appt ,      pls call pt back at [phone] (home)

## 2019-12-23 NOTE — TELEPHONE ENCOUNTER
Called pt request an appointment for chelle hip for after beginning of the year. Informed pt dr. miller will not be with [facility] she can follow up with PCP to get a referral for pain management. Pt verbalized understanding

## 2019-12-23 NOTE — TELEPHONE ENCOUNTER
----- Message from Tanya Boogie sent at 12/23/2019 12:58 PM CST -----  Contact: Patient  Patient needs to know if Cyclobenzaprine, 10mg- Arpit on [address] and [address] could be filled. Please call her back at [phone]. Thank you

## 2019-12-23 NOTE — TELEPHONE ENCOUNTER
Called pt medication request was prescribe by another MD. Pt should contact the MD. Verbalized understanding

## 2019-12-23 NOTE — TELEPHONE ENCOUNTER
Called patient to schedule pre op labs and EKG. She is waiting on a call from her PCP to schedule an appointment with them. She would like labs and EKG done on the same day as that appointment. Advised her to call us back once she has schedule with them and we would schedule her pre op testing on the same day as requested. Understanding verbalized.

## 2019-12-27 ENCOUNTER — TELEPHONE (OUTPATIENT)
Dept: INTERNAL MEDICINE | Facility: CLINIC | Age: 54
End: 2019-12-27

## 2019-12-27 ENCOUNTER — PATIENT MESSAGE (OUTPATIENT)
Dept: GASTROENTEROLOGY | Facility: CLINIC | Age: 54
End: 2019-12-27

## 2019-12-27 ENCOUNTER — PATIENT MESSAGE (OUTPATIENT)
Dept: INTERNAL MEDICINE | Facility: CLINIC | Age: 54
End: 2019-12-27

## 2019-12-27 NOTE — TELEPHONE ENCOUNTER
----- Message from Donna Burris sent at 12/27/2019 12:26 PM CST -----  Contact: pt   Type:  Sooner Apoointment Request    Caller is requesting a sooner appointment.  Caller declined first available appointment listed below.  Caller will not accept being placed on the waitlist and is requesting a message be sent to doctor.  Name of Caller: pt   When is the first available appointment? 01/30  Symptoms: headaches, not keeping anything down,   Would the patient rather a call back or a response via [facility]? Call back  Best Call Back Number: [phone]  Additional Information:

## 2019-12-30 ENCOUNTER — HOSPITAL ENCOUNTER (OUTPATIENT)
Dept: RADIOLOGY | Facility: HOSPITAL | Age: 54
Discharge: HOME | End: 2019-12-30
Attending: PHYSICIAN ASSISTANT
Payer: MEDICAID

## 2019-12-30 ENCOUNTER — OFFICE VISIT (OUTPATIENT)
Dept: INTERNAL MEDICINE | Facility: CLINIC | Age: 54
End: 2019-12-30
Payer: MEDICAID

## 2019-12-30 ENCOUNTER — CLINICAL SUPPORT (OUTPATIENT)
Dept: CARDIOLOGY | Facility: CLINIC | Age: 54
End: 2019-12-30
Payer: MEDICAID

## 2019-12-30 VITALS
HEIGHT: 65 IN | OXYGEN SATURATION: 98 % | BODY MASS INDEX: 32.83 KG/M2 | HEART RATE: 103 BPM | DIASTOLIC BLOOD PRESSURE: 80 MMHG | TEMPERATURE: 99 F | SYSTOLIC BLOOD PRESSURE: 110 MMHG | WEIGHT: 197.06 LBS

## 2019-12-30 DIAGNOSIS — K52.9 GASTROENTERITIS: ICD-10-CM

## 2019-12-30 DIAGNOSIS — Z01.818 PREOP TESTING: ICD-10-CM

## 2019-12-30 DIAGNOSIS — R10.13 EPIGASTRIC PAIN: ICD-10-CM

## 2019-12-30 DIAGNOSIS — R11.2 NAUSEA AND VOMITING, INTRACTABILITY OF VOMITING NOT SPECIFIED, UNSPECIFIED VOMITING TYPE: ICD-10-CM

## 2019-12-30 DIAGNOSIS — R10.13 EPIGASTRIC PAIN: Primary | ICD-10-CM

## 2019-12-30 PROCEDURE — 93005 ELECTROCARDIOGRAM TRACING: CPT | Mod: PBBFAC | Performed by: NUCLEAR MEDICINE

## 2019-12-30 PROCEDURE — 74019 XR ABDOMEN FLAT AND ERECT: ICD-10-PCS | Mod: 26,,, | Performed by: RADIOLOGY

## 2019-12-30 PROCEDURE — 99215 OFFICE O/P EST HI 40 MIN: CPT | Mod: PBBFAC,25 | Performed by: PHYSICIAN ASSISTANT

## 2019-12-30 PROCEDURE — 93010 ELECTROCARDIOGRAM REPORT: CPT | Mod: S$PBB,,, | Performed by: NUCLEAR MEDICINE

## 2019-12-30 PROCEDURE — 99214 PR OFFICE/OUTPT VISIT, EST, LEVL IV, 30-39 MIN: ICD-10-PCS | Mod: S$PBB,,, | Performed by: PHYSICIAN ASSISTANT

## 2019-12-30 PROCEDURE — 99999 PR PBB SHADOW E&M-EST. PATIENT-LVL V: CPT | Mod: PBBFAC,,, | Performed by: PHYSICIAN ASSISTANT

## 2019-12-30 PROCEDURE — 74019 RADEX ABDOMEN 2 VIEWS: CPT | Mod: TC

## 2019-12-30 PROCEDURE — 93010 EKG 12-LEAD: ICD-10-PCS | Mod: S$PBB,,, | Performed by: NUCLEAR MEDICINE

## 2019-12-30 PROCEDURE — 74019 RADEX ABDOMEN 2 VIEWS: CPT | Mod: 26,,, | Performed by: RADIOLOGY

## 2019-12-30 PROCEDURE — 99214 OFFICE O/P EST MOD 30 MIN: CPT | Mod: S$PBB,,, | Performed by: PHYSICIAN ASSISTANT

## 2019-12-30 PROCEDURE — 99999 PR PBB SHADOW E&M-EST. PATIENT-LVL V: ICD-10-PCS | Mod: PBBFAC,,, | Performed by: PHYSICIAN ASSISTANT

## 2019-12-30 RX ORDER — ONDANSETRON 4 MG/1
TABLET, ORALLY DISINTEGRATING ORAL
Qty: 20 TABLET | Refills: 0 | Status: SHIPPED | OUTPATIENT
Start: 2019-12-30 | End: 2020-03-23

## 2019-12-30 RX ORDER — ONDANSETRON 4 MG/1
TABLET, ORALLY DISINTEGRATING ORAL
Qty: 20 TABLET | Refills: 0 | Status: SHIPPED | OUTPATIENT
Start: 2019-12-30 | End: 2019-12-30 | Stop reason: CLARIF

## 2019-12-30 NOTE — PROGRESS NOTES
Subjective:      Patient ID: Marcie Bazan is a 54 y.o. female.    Chief Complaint: Leg Pain; Abdominal Pain; Chest Pain; Emesis; Nausea; Headache; Fatigue; and Neck Pain    Emesis    This is a new problem. The current episode started in the past 7 days. The problem occurs less than 2 times per day. The problem has been waxing and waning. The emesis has an appearance of stomach contents. Associated symptoms include abdominal pain (epigastric), arthralgias, dizziness, headaches and myalgias. Pertinent negatives include no chest pain, chills, coughing, decreased urine volume, diarrhea, fever, sweats, URI or weight loss.       Review of Systems   Constitutional: Positive for activity change, appetite change and fatigue. Negative for chills, diaphoresis, fever, unexpected weight change and weight loss.   HENT: Negative.  Negative for congestion, hearing loss, postnasal drip, rhinorrhea, sore throat, trouble swallowing and voice change.    Eyes: Negative.  Negative for visual disturbance.   Respiratory: Positive for chest tightness. Negative for apnea, cough, choking, shortness of breath, wheezing and stridor.    Cardiovascular: Negative for chest pain, palpitations and leg swelling.   Gastrointestinal: Positive for abdominal distention, abdominal pain (epigastric), nausea and vomiting. Negative for anal bleeding, blood in stool, constipation, diarrhea and rectal pain.   Endocrine: Negative for cold intolerance, heat intolerance, polydipsia and polyuria.   Genitourinary: Negative.  Negative for decreased urine volume, difficulty urinating and frequency.   Musculoskeletal: Positive for arthralgias, myalgias, neck pain and neck stiffness. Negative for back pain, gait problem and joint swelling.   Skin: Negative for color change, pallor, rash and wound.   Neurological: Positive for dizziness and headaches. Negative for tremors, weakness, light-headedness and numbness.   Hematological: Negative for adenopathy.  "  Psychiatric/Behavioral: Negative for behavioral problems, confusion, self-injury, sleep disturbance and suicidal ideas. The patient is not nervous/anxious.      Objective:   /80 (BP Location: Right arm, Patient Position: Sitting, BP Method: Medium (Manual))   Pulse 103   Temp 98.5 °F (36.9 °C) (Tympanic)   Ht 5' 5" (1.651 m)   Wt 89.4 kg (197 lb 1.5 oz)   LMP 01/01/1985 (Approximate) Comment: 1985  SpO2 98%   BMI 32.80 kg/m²     Physical Exam   Constitutional: She is oriented to person, place, and time. She appears well-developed and well-nourished. No distress.   HENT:   Head: Normocephalic and atraumatic.   Right Ear: External ear normal.   Left Ear: External ear normal.   Nose: Nose normal.   Mouth/Throat: Oropharynx is clear and moist. No oropharyngeal exudate.   Eyes: Pupils are equal, round, and reactive to light. Conjunctivae and EOM are normal.   Neck: Normal range of motion. Neck supple.   Cardiovascular: Normal rate, regular rhythm and normal heart sounds. Exam reveals no gallop and no friction rub.   No murmur heard.  Pulmonary/Chest: Effort normal and breath sounds normal. No respiratory distress. She has no wheezes. She has no rales. She exhibits no tenderness.   Abdominal: Soft. She exhibits no distension. There is no tenderness.   Musculoskeletal: Normal range of motion.   Lymphadenopathy:     She has no cervical adenopathy.   Neurological: She is alert and oriented to person, place, and time.   Skin: Skin is warm and dry. She is not diaphoretic.   Psychiatric: She has a normal mood and affect. Her behavior is normal. Judgment and thought content normal.   Vitals reviewed.    Lab Visit on 12/30/2019   Component Date Value Ref Range Status    Lipase 12/30/2019 12  4 - 60 U/L Final   Lab Visit on 12/30/2019   Component Date Value Ref Range Status    WBC 12/30/2019 11.11  3.90 - 12.70 K/uL Final    RBC 12/30/2019 5.22  4.00 - 5.40 M/uL Final    Hemoglobin 12/30/2019 12.8  12.0 - 16.0 " g/dL Final    Hematocrit 12/30/2019 43.1  37.0 - 48.5 % Final    Mean Corpuscular Volume 12/30/2019 83  82 - 98 fL Final    Mean Corpuscular Hemoglobin 12/30/2019 24.5* 27.0 - 31.0 pg Final    Mean Corpuscular Hemoglobin Conc 12/30/2019 29.7* 32.0 - 36.0 g/dL Final    RDW 12/30/2019 16.5* 11.5 - 14.5 % Final    Platelets 12/30/2019 121* 150 - 350 K/uL Final    MPV 12/30/2019 11.3  9.2 - 12.9 fL Final    Immature Granulocytes 12/30/2019 0.4  0.0 - 0.5 % Final    Gran # (ANC) 12/30/2019 9.0* 1.8 - 7.7 K/uL Final    Immature Grans (Abs) 12/30/2019 0.04  0.00 - 0.04 K/uL Final    Comment: Mild elevation in immature granulocytes is non specific and   can be seen in a variety of conditions including stress response,   acute inflammation, trauma and pregnancy. Correlation with other   laboratory and clinical findings is essential.      Lymph # 12/30/2019 1.4  1.0 - 4.8 K/uL Final    Mono # 12/30/2019 0.6  0.3 - 1.0 K/uL Final    Eos # 12/30/2019 0.0  0.0 - 0.5 K/uL Final    Baso # 12/30/2019 0.03  0.00 - 0.20 K/uL Final    nRBC 12/30/2019 0  0 /100 WBC Final    Gran% 12/30/2019 80.9* 38.0 - 73.0 % Final    Lymph% 12/30/2019 13.0* 18.0 - 48.0 % Final    Mono% 12/30/2019 5.0  4.0 - 15.0 % Final    Eosinophil% 12/30/2019 0.4  0.0 - 8.0 % Final    Basophil% 12/30/2019 0.3  0.0 - 1.9 % Final    Differential Method 12/30/2019 Automated   Final    Sodium 12/30/2019 140  136 - 145 mmol/L Final    Potassium 12/30/2019 4.3  3.5 - 5.1 mmol/L Final    Chloride 12/30/2019 105  95 - 110 mmol/L Final    CO2 12/30/2019 26  23 - 29 mmol/L Final    Glucose 12/30/2019 96  70 - 110 mg/dL Final    BUN, Bld 12/30/2019 14  6 - 20 mg/dL Final    Creatinine 12/30/2019 0.9  0.5 - 1.4 mg/dL Final    Calcium 12/30/2019 10.0  8.7 - 10.5 mg/dL Final    Total Protein 12/30/2019 7.5  6.0 - 8.4 g/dL Final    Albumin 12/30/2019 3.7  3.5 - 5.2 g/dL Final    Total Bilirubin 12/30/2019 0.4  0.1 - 1.0 mg/dL Final    Comment: For  infants and newborns, interpretation of results should be based  on gestational age, weight and in agreement with clinical  observations.  Premature Infant recommended reference ranges:  Up to 24 hours.............<8.0 mg/dL  Up to 48 hours............<12.0 mg/dL  3-5 days..................<15.0 mg/dL  6-29 days.................<15.0 mg/dL      Alkaline Phosphatase 12/30/2019 160* 55 - 135 U/L Final    AST 12/30/2019 12  10 - 40 U/L Final    ALT 12/30/2019 12  10 - 44 U/L Final    Anion Gap 12/30/2019 9  8 - 16 mmol/L Final    eGFR if African American 12/30/2019 >60.0  >60 mL/min/1.73 m^2 Final    eGFR if non African American 12/30/2019 >60.0  >60 mL/min/1.73 m^2 Final    Comment: Calculation used to obtain the estimated glomerular filtration  rate (eGFR) is the CKD-EPI equation.        X-Ray Abdomen Flat And Erect  Narrative: EXAMINATION:  XR ABDOMEN FLAT AND ERECT    CLINICAL HISTORY:  Epigastric pain    TECHNIQUE:  Flat and erect AP views of the abdomen were performed.    COMPARISON:  December 3, 2017    FINDINGS:  Scattered air-fluid levels noted within nondistended colon and rectosigmoid region.  Small air-fluid level within nondistended stomach.  Ingested capsule identified in the gastric antrum/duodenal bulb region.  No abnormally distended air-filled small bowel loops or free air.  No abnormal calcification within the abdomen or pelvis noted.  Minimal degenerative change noted in the spine and hips.  Surgical clips previously noted in the medial aspect right upper quadrant are no longer visualized.  Impression: Scattered air-fluid levels within the nondistended colon.  Etiology is uncertain.  Possible etiologies would include but not limited to gastroenteritis as well as ileus.  Correlate with clinical and laboratory findings.  Follow-up and/or further evaluation as warranted.    Additional findings as above.    Electronically signed by: Wayne Guzman  MD  Date:    12/30/2019  Time:    16:37    Assessment:     1. Epigastric pain    2. Nausea and vomiting, intractability of vomiting not specified, unspecified vomiting type    3. Gastroenteritis      Plan:   Epigastric pain  -     X-Ray Abdomen Flat And Erect; Future; Expected date: 12/30/2019  -     Lipase; Future; Expected date: 12/30/2019    Nausea and vomiting, intractability of vomiting not specified, unspecified vomiting type  -     ondansetron (ZOFRAN-ODT) 4 MG TbDL; 1-2 tablets 3 times a day as needed for nausea  Dispense: 20 tablet; Refill: 0  -     X-Ray Abdomen Flat And Erect; Future; Expected date: 12/30/2019  -     Lipase; Future; Expected date: 12/30/2019  -increase fluids  -broth/soup, bland foods  electrolyte rich beverages    Follow up if symptoms worsen or fail to improve.

## 2019-12-31 ENCOUNTER — PATIENT MESSAGE (OUTPATIENT)
Dept: PAIN MEDICINE | Facility: CLINIC | Age: 54
End: 2019-12-31

## 2019-12-31 RX ORDER — LEVOCETIRIZINE DIHYDROCHLORIDE 5 MG/1
TABLET, FILM COATED ORAL
Qty: 30 TABLET | Refills: 0 | Status: SHIPPED | OUTPATIENT
Start: 2019-12-31 | End: 2020-02-03

## 2020-01-02 ENCOUNTER — PATIENT MESSAGE (OUTPATIENT)
Dept: PAIN MEDICINE | Facility: CLINIC | Age: 55
End: 2020-01-02

## 2020-01-03 DIAGNOSIS — R60.0 BILATERAL LEG EDEMA: ICD-10-CM

## 2020-01-03 DIAGNOSIS — Z29.89 PROPHYLACTIC IMMUNOTHERAPY: ICD-10-CM

## 2020-01-03 RX ORDER — FUROSEMIDE 40 MG/1
TABLET ORAL
Qty: 30 TABLET | Refills: 0 | Status: SHIPPED | OUTPATIENT
Start: 2020-01-03 | End: 2020-02-06

## 2020-01-08 ENCOUNTER — PATIENT MESSAGE (OUTPATIENT)
Dept: PAIN MEDICINE | Facility: CLINIC | Age: 55
End: 2020-01-08

## 2020-01-08 ENCOUNTER — PATIENT MESSAGE (OUTPATIENT)
Dept: INTERNAL MEDICINE | Facility: CLINIC | Age: 55
End: 2020-01-08

## 2020-01-09 ENCOUNTER — PATIENT MESSAGE (OUTPATIENT)
Dept: ORTHOPEDICS | Facility: CLINIC | Age: 55
End: 2020-01-09

## 2020-01-16 ENCOUNTER — TELEPHONE (OUTPATIENT)
Dept: ORTHOPEDICS | Facility: CLINIC | Age: 55
End: 2020-01-16

## 2020-01-16 ENCOUNTER — TELEPHONE (OUTPATIENT)
Dept: PAIN MEDICINE | Facility: CLINIC | Age: 55
End: 2020-01-16

## 2020-01-16 NOTE — TELEPHONE ENCOUNTER
----- Message from Park Caceres sent at 1/16/2020  2:19 PM CST -----  Contact: pt  Caller request a call back regarding status of referral for Dr. Lopez ... Call back ..[phone] (home)

## 2020-01-16 NOTE — TELEPHONE ENCOUNTER
Called pt to make her an apt to get injection in her hips. Pt is scheduled on 1/23. Pt understood.        ----- Message from Joana Osborne sent at 1/16/2020 11:52 AM CST -----  Patient needs call back to schedule appointment, I was not able to pull up any appointments...[phone] (home)

## 2020-01-21 DIAGNOSIS — M25.551 PAIN OF BOTH HIP JOINTS: Primary | ICD-10-CM

## 2020-01-21 DIAGNOSIS — M25.552 PAIN OF BOTH HIP JOINTS: Primary | ICD-10-CM

## 2020-01-23 ENCOUNTER — HOSPITAL ENCOUNTER (OUTPATIENT)
Dept: RADIOLOGY | Facility: HOSPITAL | Age: 55
Discharge: HOME | End: 2020-01-23
Attending: ORTHOPAEDIC SURGERY
Payer: MEDICAID

## 2020-01-23 ENCOUNTER — OFFICE VISIT (OUTPATIENT)
Dept: ORTHOPEDICS | Facility: CLINIC | Age: 55
End: 2020-01-23
Payer: MEDICAID

## 2020-01-23 ENCOUNTER — IMMUNIZATION (OUTPATIENT)
Dept: INTERNAL MEDICINE | Facility: CLINIC | Age: 55
End: 2020-01-23
Payer: MEDICAID

## 2020-01-23 VITALS
BODY MASS INDEX: 32.82 KG/M2 | WEIGHT: 197 LBS | SYSTOLIC BLOOD PRESSURE: 113 MMHG | HEIGHT: 65 IN | HEART RATE: 102 BPM | DIASTOLIC BLOOD PRESSURE: 78 MMHG

## 2020-01-23 DIAGNOSIS — M25.551 PAIN OF BOTH HIP JOINTS: ICD-10-CM

## 2020-01-23 DIAGNOSIS — M25.552 PAIN OF BOTH HIP JOINTS: ICD-10-CM

## 2020-01-23 DIAGNOSIS — M70.61 GREATER TROCHANTERIC BURSITIS OF BOTH HIPS: Primary | ICD-10-CM

## 2020-01-23 DIAGNOSIS — M70.62 GREATER TROCHANTERIC BURSITIS OF BOTH HIPS: Primary | ICD-10-CM

## 2020-01-23 DIAGNOSIS — M53.3 SI (SACROILIAC) PAIN: ICD-10-CM

## 2020-01-23 PROCEDURE — 99999 PR PBB SHADOW E&M-EST. PATIENT-LVL III: CPT | Mod: PBBFAC,,,

## 2020-01-23 PROCEDURE — 99999 PR PBB SHADOW E&M-EST. PATIENT-LVL V: ICD-10-PCS | Mod: PBBFAC,,, | Performed by: ORTHOPAEDIC SURGERY

## 2020-01-23 PROCEDURE — 73521 X-RAY EXAM HIPS BI 2 VIEWS: CPT | Mod: TC

## 2020-01-23 PROCEDURE — 99999 PR PBB SHADOW E&M-EST. PATIENT-LVL V: CPT | Mod: PBBFAC,,, | Performed by: ORTHOPAEDIC SURGERY

## 2020-01-23 PROCEDURE — 97110 PR THERAPEUTIC EXERCISES: ICD-10-PCS | Mod: ,,, | Performed by: ORTHOPAEDIC SURGERY

## 2020-01-23 PROCEDURE — 99214 PR OFFICE/OUTPT VISIT, EST, LEVL IV, 30-39 MIN: ICD-10-PCS | Mod: 57,S$PBB,, | Performed by: ORTHOPAEDIC SURGERY

## 2020-01-23 PROCEDURE — 99214 OFFICE O/P EST MOD 30 MIN: CPT | Mod: 57,S$PBB,, | Performed by: ORTHOPAEDIC SURGERY

## 2020-01-23 PROCEDURE — 99215 OFFICE O/P EST HI 40 MIN: CPT | Mod: PBBFAC,25,27 | Performed by: ORTHOPAEDIC SURGERY

## 2020-01-23 PROCEDURE — 73521 XR HIPS BILATERAL 2 VIEW INCL AP PELVIS: ICD-10-PCS | Mod: 26,,, | Performed by: RADIOLOGY

## 2020-01-23 PROCEDURE — 99999 PR PBB SHADOW E&M-EST. PATIENT-LVL III: ICD-10-PCS | Mod: PBBFAC,,,

## 2020-01-23 PROCEDURE — 90686 IIV4 VACC NO PRSV 0.5 ML IM: CPT | Mod: PBBFAC

## 2020-01-23 PROCEDURE — 73521 X-RAY EXAM HIPS BI 2 VIEWS: CPT | Mod: 26,,, | Performed by: RADIOLOGY

## 2020-01-23 PROCEDURE — 99213 OFFICE O/P EST LOW 20 MIN: CPT | Mod: PBBFAC,25

## 2020-01-23 PROCEDURE — 97110 THERAPEUTIC EXERCISES: CPT | Mod: ,,, | Performed by: ORTHOPAEDIC SURGERY

## 2020-01-23 NOTE — PATIENT INSTRUCTIONS
If you are experiencing pain/discomfort or have questions after 5pm and would like to be connected to the [facility] on call team, please call this number [phone] and specify which Orthopedics/Sports Medicine provider is treating you.   Lumbar Rotation    1. Lie on your back on the floor, with your knees bent and your feet flat on the floor. Dont press your neck or lower back to the floor.  2. Lean both of your knees to one side. Turn your head in the opposite direction. Keep your shoulders flat on the floor. Be gentle and dont push through pain.  3. Hold for 20 seconds. Then slowly move your knees and head in the other direction.  4. Repeat 2 to 5 times, or as instructed.   Date Last Reviewed: 3/10/2016  © 4497-5582 [employer]. [address] [place] [id number]. All rights reserved. This information is not intended as a substitute for professional medical care. Always follow your healthcare professional's instructions.

## 2020-01-23 NOTE — PROGRESS NOTES
Subjective:     Patient ID: Marcie Bazan is a 54 y.o. female.    Chief Complaint: Pain of the Right Hip and Pain of the Left Hip    Marcie Bazan is here for left hip pain. She has previously seen Dr. Horan for this problem. She has previously seen Dr. Bee as well.  She has had relief from prior trochanteric CSI in the past. She would like to try another one.    She is not interested in left hip surgery at this time    She has had prior xray guided SI joint injections with Dr. Desai, sounds like she had good relief with these as well    Hip Pain    The pain is present in the right hip and left hip. This is a recurrent problem. The current episode started more than 1 month ago. The problem occurs constantly. The problem has been gradually worsening. The quality of the pain is described as aching and burning. The pain is at a severity of 9/10. Pertinent negatives include no fever or itching. The symptoms are aggravated by activity, bending, bearing weight, walking, standing and touching. She has tried injection treatment for the symptoms. The treatment provided moderate relief. Physical therapy was not tried.      Past Medical History:   Diagnosis Date    Alcohol abuse     Alcoholic hepatitis     Anemia of chronic disease     Arthritis     generialized    Cancer 12/26/2017    liver    Coagulopathy     Encounter for blood transfusion     End stage liver disease     History of alcohol abuse     Hyperlipidemia     Hypertension     Hyponatremia     Liver cirrhosis     Liver transplant candidate 12/26/2017    Obesity (BMI 30-39.9) 1/5/2016     Past Surgical History:   Procedure Laterality Date    BREAST BIOPSY Left     benign    CHOLECYSTECTOMY      COLONOSCOPY N/A 12/1/2017    Procedure: COLONOSCOPY;  Surgeon: Filemon Fuentes MD;  Location: [address];  Service: Endoscopy;  Laterality: N/A;    COLONOSCOPY N/A 12/5/2017    Procedure: COLONOSCOPY;  Surgeon: José Chavira MD;   Location: [facility] ENDO (2ND FLR);  Service: Endoscopy;  Laterality: N/A;    HYSTERECTOMY      complete     INJECTION OF ANESTHETIC AGENT INTO SACROILIAC JOINT Right 6/14/2019    Procedure: right Sacroiliac Joint Injection;  Surgeon: David Desai MD;  Location: [facility] PAIN MGT;  Service: Pain Management;  Laterality: Right;    INJECTION OF PIRIFORMIS MUSCLE Right 6/14/2019    Procedure: Right piriformis injection;  Surgeon: David Desai MD;  Location: [facility] PAIN MGT;  Service: Pain Management;  Laterality: Right;    LIVER TRANSPLANT  12/2017     Family History   Problem Relation Age of Onset    Hypertension Mother     Alzheimer's disease Mother     Hypertension Father     Diabetes Father     Diabetes Sister     Depression Maternal Grandfather     Breast cancer Paternal Aunt      Social History     Socioeconomic History    Marital status: Single     Spouse name: Not on file    Number of children: 2    Years of education: Not on file    Highest education level: Not on file   Occupational History    Not on file   Social Needs    Financial resource strain: Not on file    Food insecurity:     Worry: Not on file     Inability: Not on file    Transportation needs:     Medical: Not on file     Non-medical: Not on file   Tobacco Use    Smoking status: Never Smoker    Smokeless tobacco: Never Used   Substance and Sexual Activity    Alcohol use: No     Frequency: Never     Comment: Currently admitted to inpatient rehab 7/2017    Drug use: No    Sexual activity: Not Currently   Lifestyle    Physical activity:     Days per week: Not on file     Minutes per session: Not on file    Stress: Not on file   Relationships    Social connections:     Talks on phone: Not on file     Gets together: Not on file     Attends Zoroastrian service: Not on file     Active member of club or organization: Not on file     Attends meetings of clubs or organizations: Not on file     Relationship status: Not on file   Other  Topics Concern    Not on file   Social History Narrative    Not on file     Medication List with Changes/Refills   Current Medications    ATORVASTATIN (LIPITOR) 40 MG TABLET    Take 1 tablet (40 mg total) by mouth every evening.    AZELASTINE (ASTELIN) 137 MCG (0.1 %) NASAL SPRAY    2 sprays (274 mcg total) by Nasal route 2 (two) times daily as needed for Rhinitis.    BIOTIN ORAL    Take by mouth every evening.     CYCLOBENZAPRINE (FLEXERIL) 10 MG TABLET    TAKE 1/2 TO 1 TABLET BY MOUTH EVERY NIGHT AT BEDTIME AS NEEDED FOR MUSCLE SPASMS. MAY CAUSE DROWSINESS    DICLOFENAC SODIUM (VOLTAREN) 1 % GEL    Apply 2 g topically 3 (three) times daily as needed.    ERGOCALCIFEROL (ERGOCALCIFEROL) 50,000 UNIT CAP    Take 1 capsule (50,000 Units total) by mouth every 7 days.    FAMOTIDINE (PEPCID) 20 MG TABLET    Take 1 tablet (20 mg total) by mouth 2 (two) times daily.    FUROSEMIDE (LASIX) 40 MG TABLET    TAKE 1 TABLET(40 MG) BY MOUTH EVERY DAY    GABAPENTIN (NEURONTIN) 300 MG CAPSULE    TAKE 2 CAPSULES(600 MG) BY MOUTH TWICE DAILY    GABAPENTIN (NEURONTIN) 600 MG TABLET    TAKE 1 AND 1/2 TABLETS BY MOUTH THREE TIMES DAILY. MAY CAUSE DROWSINESS    LEVOCETIRIZINE (XYZAL) 5 MG TABLET    TAKE 1 TABLET(5 MG) BY MOUTH EVERY EVENING    MAGNESIUM OXIDE (MAG-OX) 400 MG (241.3 MG MAGNESIUM) TABLET    Take 2 tablets (800 mg total) by mouth 2 (two) times daily.    METHOCARBAMOL (ROBAXIN) 750 MG TAB    TAKE 1 TABLET BY MOUTH TWICE DAILY AS NEEDED FOR MUSCLE SPASMS.    MYCOPHENOLATE (CELLCEPT) 250 MG CAP        NIFEDIPINE (PROCARDIA-XL) 30 MG (OSM) 24 HR TABLET    Take 1 tablet (30 mg total) by mouth once daily.    ONDANSETRON (ZOFRAN-ODT) 4 MG TBDL    1-2 tablets 3 times a day as needed for nausea    POTASSIUM CHLORIDE (KLOR-CON) 10 MEQ TBSR    TAKE 2 TABLETS(20 MEQ) BY MOUTH EVERY DAY    TACROLIMUS (PROGRAF) 1 MG CAP    Take 2 capsules (2 mg total) by mouth every 12 (twelve) hours.   Discontinued Medications    BACLOFEN (LIORESAL) 10  MG TABLET    TK 1 T PO TID PRN MUSCLE SPASMS    GABAPENTIN (NEURONTIN) 600 MG TABLET    TAKE 1 AND 1/2 TABLETS BY MOUTH THREE TIMES DAILY. MAY CAUSE DROWSINESS    METHOCARBAMOL (ROBAXIN) 750 MG TAB    TK 1 T PO BID PRF MUSCLE SPASMS.    TRAMADOL (ULTRAM) 50 MG TABLET    TK 1 T PO Q 8 H PRN P    TRAZODONE (DESYREL) 50 MG TABLET    Take 0.5 tablets (25 mg total) by mouth every evening.     Review of patient's allergies indicates:   Allergen Reactions    Ferrous sulfate Other (See Comments)     Patient states the pill makes her sick. She stated she would rather have a shot     Review of Systems   Constitution: Negative for fever.   HENT: Negative for sore throat.    Eyes: Negative for blurred vision.   Cardiovascular: Negative for dyspnea on exertion.   Respiratory: Negative for shortness of breath.    Hematologic/Lymphatic: Does not bruise/bleed easily.   Skin: Negative for itching.   Gastrointestinal: Negative for vomiting.   Genitourinary: Negative for dysuria.   Neurological: Negative for dizziness.   Psychiatric/Behavioral: The patient does not have insomnia.        Objective:   Body mass index is 32.78 kg/m².  Vitals:    01/23/20 1142   BP: 113/78   Pulse: 102           General    Vitals reviewed.  Constitutional: She is oriented to person, place, and time. She appears well-developed and well-nourished. No distress.   HENT:   Mouth/Throat: No oropharyngeal exudate.   Eyes: Right eye exhibits no discharge. Left eye exhibits no discharge.   Neck: Normal range of motion.   Cardiovascular: Normal rate.    Pulmonary/Chest: Effort normal and breath sounds normal. No respiratory distress.   Neurological: She is alert and oriented to person, place, and time. She has normal reflexes. No cranial nerve deficit. Coordination normal.   Psychiatric: She has a normal mood and affect. Her behavior is normal. Judgment and thought content normal.     General Musculoskeletal Exam   Gait: antalgic   Pelvic Obliquity:  none      Right Knee Exam     Inspection   Alignment:  normal  Effusion: absent    Left Knee Exam     Inspection   Alignment:  normal  Effusion: absent    Right Hip Exam     Inspection   Scars: absent  Swelling: absent  Bruising: absent  No deformity of hip.  Quadriceps Atrophy:  Negative  Erythema: absent    Tenderness   The patient tender to palpation of the piriformis, trochanteric bursa and SI joint.    Range of Motion   Abduction:  40 normal   Adduction:  20 normal   Extension:  0 normal   Flexion:  110 normal   External rotation:  50 normal   Internal rotation:  30 normal     Tests   Pain w/ forced internal rotation (CIERA): present  Pain w/ forced external rotation (FADIR): absent  Winnie: positive  Trendelenburg Test: negative  Log Roll: negative    Other   Sensation: normal    Comments:  Tenderness noted over the IT Band.  Left Hip Exam     Inspection   Scars: absent  Swelling: absent  No deformity of hip.  Quadriceps Atrophy:  negative  Erythema: absent  Bruising: absent    Tenderness   The patient tender to palpation of the piriformis, trochanteric bursa and SI joint.    Range of Motion   Abduction:  40 normal   Adduction:  20 normal   Extension:  0 normal   Flexion:  110 normal   External rotation:  50 normal   Internal rotation: 30 normal     Tests   Pain w/ forced internal rotation (CIERA): present  Pain w/ forced external rotation (FADIR): absent  Winnie: positive  Trendelenburg Test: negative  Log Roll: negative    Other   Sensation: normal    Comments:  Tenderness noted over the IT Band.          Muscle Strength   Right Lower Extremity   Hip Abduction: 5/5   Hip Adduction: 5/5   Hip Flexion: 5/5   Left Lower Extremity   Hip Abduction: 5/5   Hip Adduction: 5/5   Hip Flexion: 5/5     Vascular Exam       Capillary Refill  Right Hand: normal capillary refill  Left Hand: normal capillary refill    Edema  Right Upper Leg: absent  Left Upper Leg: absent      IMAGING Radiographs / Imaging : Results reviewed by  me and interpreted by me, discussed with the patient and / or family   X-Ray Hips Bilateral 2 View Incl AP Pelvis  Narrative: EXAMINATION:  XR HIPS BILATERAL 2 VIEW INCL AP PELVIS    CLINICAL HISTORY:  Pain in right hip    TECHNIQUE:  AP view of the pelvis and frogleg lateral views of both hips were performed.    COMPARISON:  Radiographs from January 2019.    FINDINGS:  There is relative preservation of the hip joint spaces.  No fracture or dislocation is seen.  No collapse of the femoral heads.  Sacroiliac joints remain intact.  Pubic symphysis demonstrates a normal appearance  Impression: As above    Electronically signed by: Blaine Das MD  Date:    01/23/2020  Time:    13:21        Assessment:     Encounter Diagnoses   Name Primary?    Greater trochanteric bursitis of both hips Yes    Pain of both hip joints     SI (sacroiliac) pain         Plan:     I had an in depth discussion today with Marcie today regarding her left hip problem, going over her radiographs and the model to help further her understanding. I explained the anatomy and pathophysiology of the problem. I told Marcie  that I believe the problem relates to multifactorial reasons - I think she most likely has spectrum of trochanteric bursitis, lumbar and or SI joint pain and chronic pain . We had an in depth discussion regarding appropriate treatment and management of her condition.     From a treatment standpoint, the decision was made to go forward with :     Good news here is I see no indication for surgical intervention or need for referral to Total Joint surgeon at this time, as there is no evidence of hip fracture, dislocation, or significant joint space narrowing on radiographs    She would like to try another trochanteric hip injection, and I do think his might be helpful. With particular body habitus there is enough soft tissue overlying the trochanter here that I do not think the accuracy of such an injection today in office will be good  "enough to justify non guided injection here. I want to make sure she has accurate placement of the injection so that she does not "waste" a chance to get better here.    HEP 75620 - I, instructed and demonstrated a Left and right hip stretching HEP including CIERA stretching. The patient then demonstrated understanding of exercises and proper technique. This program was performed for 15 minutes.     Recommend evaluation by pain management service if agreeable to consider possible hip / SI joint / Lumbar area injections if / as indicated    Marcie Bazan is very agreeable with above noted plan, and all questions answered to full satisfaction today.      As she is not currently indicated nor desiring hip surgery, she would mostly likely best be served with follow up with nonoperative Ortho as needed, always happy to re evaluate her    "

## 2020-01-24 ENCOUNTER — TELEPHONE (OUTPATIENT)
Dept: PAIN MEDICINE | Facility: CLINIC | Age: 55
End: 2020-01-24

## 2020-01-24 ENCOUNTER — OFFICE VISIT (OUTPATIENT)
Dept: PAIN MEDICINE | Facility: CLINIC | Age: 55
End: 2020-01-24
Payer: MEDICAID

## 2020-01-24 VITALS
BODY MASS INDEX: 32.82 KG/M2 | HEART RATE: 94 BPM | SYSTOLIC BLOOD PRESSURE: 133 MMHG | RESPIRATION RATE: 18 BRPM | DIASTOLIC BLOOD PRESSURE: 91 MMHG | HEIGHT: 65 IN | WEIGHT: 197 LBS

## 2020-01-24 DIAGNOSIS — M70.61 GREATER TROCHANTERIC BURSITIS OF BOTH HIPS: ICD-10-CM

## 2020-01-24 DIAGNOSIS — M70.62 GREATER TROCHANTERIC BURSITIS OF BOTH HIPS: ICD-10-CM

## 2020-01-24 DIAGNOSIS — M46.1 SACROILIITIS: Primary | ICD-10-CM

## 2020-01-24 PROCEDURE — 99215 OFFICE O/P EST HI 40 MIN: CPT | Mod: PBBFAC | Performed by: PHYSICIAN ASSISTANT

## 2020-01-24 PROCEDURE — 99999 PR PBB SHADOW E&M-EST. PATIENT-LVL V: CPT | Mod: PBBFAC,,, | Performed by: PHYSICIAN ASSISTANT

## 2020-01-24 PROCEDURE — 99214 OFFICE O/P EST MOD 30 MIN: CPT | Mod: S$PBB,,, | Performed by: PHYSICIAN ASSISTANT

## 2020-01-24 PROCEDURE — 99214 PR OFFICE/OUTPT VISIT, EST, LEVL IV, 30-39 MIN: ICD-10-PCS | Mod: S$PBB,,, | Performed by: PHYSICIAN ASSISTANT

## 2020-01-24 PROCEDURE — 99999 PR PBB SHADOW E&M-EST. PATIENT-LVL V: ICD-10-PCS | Mod: PBBFAC,,, | Performed by: PHYSICIAN ASSISTANT

## 2020-01-24 RX ORDER — METHYLPREDNISOLONE ACETATE 40 MG/ML
40 INJECTION, SUSPENSION INTRA-ARTICULAR; INTRALESIONAL; INTRAMUSCULAR; SOFT TISSUE
Status: COMPLETED | OUTPATIENT
Start: 2020-01-24 | End: 2020-01-24

## 2020-01-24 RX ADMIN — METHYLPREDNISOLONE ACETATE 40 MG: 40 INJECTION, SUSPENSION INTRALESIONAL; INTRAMUSCULAR; INTRASYNOVIAL; SOFT TISSUE at 09:01

## 2020-01-24 NOTE — TELEPHONE ENCOUNTER
----- Message from Oksana Grimm sent at 1/24/2020  8:04 AM CST -----  Contact: pt  States she went to the [place] for her appt this morning. States she is coming to [facility]. Please call pt [phone]. Thank you
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
## 2020-01-27 ENCOUNTER — LAB VISIT (OUTPATIENT)
Dept: LAB | Facility: HOSPITAL | Age: 55
End: 2020-01-27
Attending: INTERNAL MEDICINE
Payer: MEDICAID

## 2020-01-27 DIAGNOSIS — Z94.4 LIVER REPLACED BY TRANSPLANT: ICD-10-CM

## 2020-01-27 DIAGNOSIS — E83.42 HYPOMAGNESEMIA: ICD-10-CM

## 2020-01-27 LAB
ALBUMIN SERPL BCP-MCNC: 3.8 G/DL (ref 3.5–5.2)
ALP SERPL-CCNC: 174 U/L (ref 55–135)
ALT SERPL W/O P-5'-P-CCNC: 11 U/L (ref 10–44)
ANION GAP SERPL CALC-SCNC: 8 MMOL/L (ref 8–16)
ANISOCYTOSIS BLD QL SMEAR: SLIGHT
AST SERPL-CCNC: 9 U/L (ref 10–40)
BASOPHILS # BLD AUTO: 0.04 K/UL (ref 0–0.2)
BASOPHILS NFR BLD: 0.3 % (ref 0–1.9)
BILIRUB SERPL-MCNC: 0.5 MG/DL (ref 0.1–1)
BUN SERPL-MCNC: 11 MG/DL (ref 6–20)
CALCIUM SERPL-MCNC: 9.3 MG/DL (ref 8.7–10.5)
CHLORIDE SERPL-SCNC: 103 MMOL/L (ref 95–110)
CO2 SERPL-SCNC: 27 MMOL/L (ref 23–29)
CREAT SERPL-MCNC: 0.8 MG/DL (ref 0.5–1.4)
DIFFERENTIAL METHOD: ABNORMAL
DOHLE BOD BLD QL SMEAR: PRESENT
EOSINOPHIL # BLD AUTO: 0.1 K/UL (ref 0–0.5)
EOSINOPHIL NFR BLD: 0.6 % (ref 0–8)
ERYTHROCYTE [DISTWIDTH] IN BLOOD BY AUTOMATED COUNT: 15.9 % (ref 11.5–14.5)
EST. GFR  (AFRICAN AMERICAN): >60 ML/MIN/1.73 M^2
EST. GFR  (NON AFRICAN AMERICAN): >60 ML/MIN/1.73 M^2
GLUCOSE SERPL-MCNC: 103 MG/DL (ref 70–110)
HCT VFR BLD AUTO: 41.1 % (ref 37–48.5)
HGB BLD-MCNC: 12.4 G/DL (ref 12–16)
HYPOCHROMIA BLD QL SMEAR: ABNORMAL
IMM GRANULOCYTES # BLD AUTO: 0.04 K/UL (ref 0–0.04)
IMM GRANULOCYTES NFR BLD AUTO: 0.3 % (ref 0–0.5)
LYMPHOCYTES # BLD AUTO: 2.3 K/UL (ref 1–4.8)
LYMPHOCYTES NFR BLD: 17.5 % (ref 18–48)
MAGNESIUM SERPL-MCNC: 1.5 MG/DL (ref 1.6–2.6)
MCH RBC QN AUTO: 24.7 PG (ref 27–31)
MCHC RBC AUTO-ENTMCNC: 30.2 G/DL (ref 32–36)
MCV RBC AUTO: 82 FL (ref 82–98)
MONOCYTES # BLD AUTO: 0.7 K/UL (ref 0.3–1)
MONOCYTES NFR BLD: 5.1 % (ref 4–15)
NEUTROPHILS # BLD AUTO: 10.1 K/UL (ref 1.8–7.7)
NEUTROPHILS NFR BLD: 76.2 % (ref 38–73)
NRBC BLD-RTO: 0 /100 WBC
PLATELET # BLD AUTO: 165 K/UL (ref 150–350)
PLATELET BLD QL SMEAR: ABNORMAL
PMV BLD AUTO: 11.6 FL (ref 9.2–12.9)
POTASSIUM SERPL-SCNC: 4.3 MMOL/L (ref 3.5–5.1)
PROT SERPL-MCNC: 7.5 G/DL (ref 6–8.4)
RBC # BLD AUTO: 5.03 M/UL (ref 4–5.4)
SODIUM SERPL-SCNC: 138 MMOL/L (ref 136–145)
TOXIC GRANULES BLD QL SMEAR: PRESENT
WBC # BLD AUTO: 13.27 K/UL (ref 3.9–12.7)
WBC TOXIC VACUOLES BLD QL SMEAR: PRESENT

## 2020-01-27 PROCEDURE — 85025 COMPLETE CBC W/AUTO DIFF WBC: CPT

## 2020-01-27 PROCEDURE — 80197 ASSAY OF TACROLIMUS: CPT

## 2020-01-27 PROCEDURE — 36415 COLL VENOUS BLD VENIPUNCTURE: CPT

## 2020-01-27 PROCEDURE — 83735 ASSAY OF MAGNESIUM: CPT

## 2020-01-27 PROCEDURE — 80053 COMPREHEN METABOLIC PANEL: CPT

## 2020-01-28 LAB — TACROLIMUS BLD-MCNC: 9 NG/ML (ref 5–15)

## 2020-01-31 DIAGNOSIS — Z94.4 LIVER REPLACED BY TRANSPLANT: ICD-10-CM

## 2020-01-31 NOTE — PRE-PROCEDURE INSTRUCTIONS
Spoke with patient     regarding procedure scheduled on 2/7/2020  Arrival time not yet approved, will update patient with approval  Has patient been sick with fever or on antibiotics within the last 7 days?no  Has patient received a vaccination within the last 7 days? no  Has the patient stopped all medications as directed? Not required  Does patient have a pacemaker and or defibrillator? no  Does the patient have a ride to and from procedure and someone reliable to remain with patient? Yes jesús  Is the patient diabetic?no  Does the patient have sleep apnea? Or use O2 at home? No no  Is the patient receiving sedation? yes  Is the patient instructed to remain NPO beginning at midnight the night before their procedure? yes  Procedure location confirmed with patient? yes  Travel screening: negative

## 2020-01-31 NOTE — TELEPHONE ENCOUNTER
Dr. Souza reviewed your labs.  Called patient to let her know to decrease Prograf to 2 mg in the morning and 1 mg in the evening.  Repeat labs on 02/04/20

## 2020-01-31 NOTE — TELEPHONE ENCOUNTER
----- Message from Joselin Souza MD sent at 1/30/2020  5:58 PM CST -----  Tacrolimus level is higher than needed, decreased dose to 2 mg in the morning 1 mg in the evening.  Repeat labs next week.

## 2020-02-01 DIAGNOSIS — M25.612 DECREASED SHOULDER MOBILITY, LEFT: ICD-10-CM

## 2020-02-01 DIAGNOSIS — G62.9 NEUROPATHY: ICD-10-CM

## 2020-02-01 DIAGNOSIS — Z29.89 PROPHYLACTIC IMMUNOTHERAPY: ICD-10-CM

## 2020-02-01 DIAGNOSIS — R60.0 BILATERAL LEG EDEMA: ICD-10-CM

## 2020-02-03 DIAGNOSIS — E55.9 VITAMIN D DEFICIENCY: ICD-10-CM

## 2020-02-03 RX ORDER — LEVOCETIRIZINE DIHYDROCHLORIDE 5 MG/1
TABLET, FILM COATED ORAL
Qty: 30 TABLET | Refills: 0 | Status: SHIPPED | OUTPATIENT
Start: 2020-02-03 | End: 2020-03-03

## 2020-02-04 ENCOUNTER — LAB VISIT (OUTPATIENT)
Dept: LAB | Facility: HOSPITAL | Age: 55
End: 2020-02-04
Attending: INTERNAL MEDICINE
Payer: MEDICAID

## 2020-02-04 ENCOUNTER — PATIENT MESSAGE (OUTPATIENT)
Dept: PAIN MEDICINE | Facility: CLINIC | Age: 55
End: 2020-02-04

## 2020-02-04 DIAGNOSIS — Z94.4 LIVER REPLACED BY TRANSPLANT: ICD-10-CM

## 2020-02-04 DIAGNOSIS — E83.42 HYPOMAGNESEMIA: ICD-10-CM

## 2020-02-04 LAB
ALBUMIN SERPL BCP-MCNC: 3.8 G/DL (ref 3.5–5.2)
ALP SERPL-CCNC: 184 U/L (ref 55–135)
ALT SERPL W/O P-5'-P-CCNC: 7 U/L (ref 10–44)
ANION GAP SERPL CALC-SCNC: 13 MMOL/L (ref 8–16)
AST SERPL-CCNC: 9 U/L (ref 10–40)
BASOPHILS # BLD AUTO: 0.03 K/UL (ref 0–0.2)
BASOPHILS NFR BLD: 0.3 % (ref 0–1.9)
BILIRUB SERPL-MCNC: 0.5 MG/DL (ref 0.1–1)
BUN SERPL-MCNC: 8 MG/DL (ref 6–20)
CALCIUM SERPL-MCNC: 9.4 MG/DL (ref 8.7–10.5)
CHLORIDE SERPL-SCNC: 103 MMOL/L (ref 95–110)
CO2 SERPL-SCNC: 25 MMOL/L (ref 23–29)
CREAT SERPL-MCNC: 0.8 MG/DL (ref 0.5–1.4)
DIFFERENTIAL METHOD: ABNORMAL
EOSINOPHIL # BLD AUTO: 0.1 K/UL (ref 0–0.5)
EOSINOPHIL NFR BLD: 0.6 % (ref 0–8)
ERYTHROCYTE [DISTWIDTH] IN BLOOD BY AUTOMATED COUNT: 15.8 % (ref 11.5–14.5)
EST. GFR  (AFRICAN AMERICAN): >60 ML/MIN/1.73 M^2
EST. GFR  (NON AFRICAN AMERICAN): >60 ML/MIN/1.73 M^2
GLUCOSE SERPL-MCNC: 98 MG/DL (ref 70–110)
HCT VFR BLD AUTO: 40.2 % (ref 37–48.5)
HGB BLD-MCNC: 12.4 G/DL (ref 12–16)
IMM GRANULOCYTES # BLD AUTO: 0.04 K/UL (ref 0–0.04)
IMM GRANULOCYTES NFR BLD AUTO: 0.3 % (ref 0–0.5)
LYMPHOCYTES # BLD AUTO: 1.9 K/UL (ref 1–4.8)
LYMPHOCYTES NFR BLD: 16.1 % (ref 18–48)
MAGNESIUM SERPL-MCNC: 1.4 MG/DL (ref 1.6–2.6)
MCH RBC QN AUTO: 25.2 PG (ref 27–31)
MCHC RBC AUTO-ENTMCNC: 30.8 G/DL (ref 32–36)
MCV RBC AUTO: 82 FL (ref 82–98)
MONOCYTES # BLD AUTO: 0.5 K/UL (ref 0.3–1)
MONOCYTES NFR BLD: 4.5 % (ref 4–15)
NEUTROPHILS # BLD AUTO: 9.3 K/UL (ref 1.8–7.7)
NEUTROPHILS NFR BLD: 78.2 % (ref 38–73)
NRBC BLD-RTO: 0 /100 WBC
PLATELET # BLD AUTO: 78 K/UL (ref 150–350)
PMV BLD AUTO: 11.2 FL (ref 9.2–12.9)
POTASSIUM SERPL-SCNC: 3.7 MMOL/L (ref 3.5–5.1)
PROT SERPL-MCNC: 7.6 G/DL (ref 6–8.4)
RBC # BLD AUTO: 4.93 M/UL (ref 4–5.4)
SODIUM SERPL-SCNC: 141 MMOL/L (ref 136–145)
WBC # BLD AUTO: 11.82 K/UL (ref 3.9–12.7)

## 2020-02-04 PROCEDURE — 85025 COMPLETE CBC W/AUTO DIFF WBC: CPT

## 2020-02-04 PROCEDURE — 83735 ASSAY OF MAGNESIUM: CPT

## 2020-02-04 PROCEDURE — 36415 COLL VENOUS BLD VENIPUNCTURE: CPT

## 2020-02-04 PROCEDURE — 80197 ASSAY OF TACROLIMUS: CPT

## 2020-02-04 PROCEDURE — 80053 COMPREHEN METABOLIC PANEL: CPT

## 2020-02-04 RX ORDER — ERGOCALCIFEROL 1.25 MG/1
CAPSULE ORAL
Qty: 12 CAPSULE | Refills: 0 | Status: SHIPPED | OUTPATIENT
Start: 2020-02-04 | End: 2020-04-30

## 2020-02-04 RX ORDER — CYCLOBENZAPRINE HCL 10 MG
TABLET ORAL
Qty: 30 TABLET | Refills: 0 | Status: SHIPPED | OUTPATIENT
Start: 2020-02-04 | End: 2020-03-23

## 2020-02-05 LAB — TACROLIMUS BLD-MCNC: 7.9 NG/ML (ref 5–15)

## 2020-02-05 RX ORDER — TACROLIMUS 1 MG/1
CAPSULE ORAL
Qty: 120 CAPSULE | Refills: 11 | Status: SHIPPED | OUTPATIENT
Start: 2020-02-05 | End: 2020-02-14

## 2020-02-06 ENCOUNTER — TELEPHONE (OUTPATIENT)
Dept: PAIN MEDICINE | Facility: CLINIC | Age: 55
End: 2020-02-06

## 2020-02-06 RX ORDER — FUROSEMIDE 40 MG/1
TABLET ORAL
Qty: 30 TABLET | Refills: 0 | Status: SHIPPED | OUTPATIENT
Start: 2020-02-06 | End: 2020-03-12

## 2020-02-06 RX ORDER — POTASSIUM CHLORIDE 750 MG/1
TABLET, EXTENDED RELEASE ORAL
Qty: 60 TABLET | Refills: 2 | Status: SHIPPED | OUTPATIENT
Start: 2020-02-06 | End: 2020-05-12

## 2020-02-06 RX ORDER — GABAPENTIN 300 MG/1
CAPSULE ORAL
Qty: 120 CAPSULE | Refills: 2 | Status: SHIPPED | OUTPATIENT
Start: 2020-02-06 | End: 2020-05-15 | Stop reason: SDUPTHER

## 2020-02-06 NOTE — TELEPHONE ENCOUNTER
----- Message from Brandie Schneider sent at 2/6/2020  8:25 AM CST -----  Contact: [phone]  Would  Like to consult with the nurse, Patient would like to know what time she is supposed to come in for her surgery Patient would like yo speak with the nurse , Please call back at  [phone], Thank sj

## 2020-02-06 NOTE — TELEPHONE ENCOUNTER
Provided pt with [phone] to try and obtain procedure time all questions answered at this time//dp

## 2020-02-07 ENCOUNTER — HOSPITAL ENCOUNTER (OUTPATIENT)
Facility: HOSPITAL | Age: 55
Discharge: HOME | End: 2020-02-07
Attending: PAIN MEDICINE | Admitting: PAIN MEDICINE
Payer: MEDICAID

## 2020-02-07 ENCOUNTER — TELEPHONE (OUTPATIENT)
Dept: TRANSPLANT | Facility: CLINIC | Age: 55
End: 2020-02-07

## 2020-02-07 VITALS
HEART RATE: 76 BPM | OXYGEN SATURATION: 100 % | BODY MASS INDEX: 31.11 KG/M2 | DIASTOLIC BLOOD PRESSURE: 77 MMHG | RESPIRATION RATE: 17 BRPM | TEMPERATURE: 98 F | WEIGHT: 186.75 LBS | SYSTOLIC BLOOD PRESSURE: 121 MMHG | HEIGHT: 65 IN

## 2020-02-07 DIAGNOSIS — M70.62 GREATER TROCHANTERIC BURSITIS OF BOTH HIPS: ICD-10-CM

## 2020-02-07 DIAGNOSIS — M46.1 SACROILIITIS: ICD-10-CM

## 2020-02-07 DIAGNOSIS — M53.3 SACROILIAC JOINT DYSFUNCTION: Primary | ICD-10-CM

## 2020-02-07 DIAGNOSIS — M70.61 GREATER TROCHANTERIC BURSITIS OF BOTH HIPS: ICD-10-CM

## 2020-02-07 PROCEDURE — 20610 PR DRAIN/INJECT LARGE JOINT/BURSA: ICD-10-PCS | Mod: 50,,, | Performed by: PAIN MEDICINE

## 2020-02-07 PROCEDURE — 99152 MOD SED SAME PHYS/QHP 5/>YRS: CPT | Mod: ,,, | Performed by: PAIN MEDICINE

## 2020-02-07 PROCEDURE — 63600175 PHARM REV CODE 636 W HCPCS: Performed by: PAIN MEDICINE

## 2020-02-07 PROCEDURE — 25500020 PHARM REV CODE 255: Performed by: PAIN MEDICINE

## 2020-02-07 PROCEDURE — 99152 PR MOD CONSCIOUS SEDATION, SAME PHYS, 5+ YRS, FIRST 15 MIN: ICD-10-PCS | Mod: ,,, | Performed by: PAIN MEDICINE

## 2020-02-07 PROCEDURE — 27096 INJECT SACROILIAC JOINT: CPT | Mod: 50 | Performed by: PAIN MEDICINE

## 2020-02-07 PROCEDURE — 27096 INJECT SACROILIAC JOINT: CPT | Mod: 50,,, | Performed by: PAIN MEDICINE

## 2020-02-07 PROCEDURE — 25000003 PHARM REV CODE 250: Performed by: PAIN MEDICINE

## 2020-02-07 PROCEDURE — 20610 DRAIN/INJ JOINT/BURSA W/O US: CPT | Mod: 50 | Performed by: PAIN MEDICINE

## 2020-02-07 PROCEDURE — 99152 MOD SED SAME PHYS/QHP 5/>YRS: CPT | Performed by: PAIN MEDICINE

## 2020-02-07 PROCEDURE — 27096 PR INJECTION,SACROILIAC JOINT: ICD-10-PCS | Mod: 50,,, | Performed by: PAIN MEDICINE

## 2020-02-07 PROCEDURE — 20610 DRAIN/INJ JOINT/BURSA W/O US: CPT | Mod: 50,,, | Performed by: PAIN MEDICINE

## 2020-02-07 PROCEDURE — 27000221 HC OXYGEN, UP TO 24 HOURS

## 2020-02-07 RX ORDER — LIDOCAINE HYDROCHLORIDE 20 MG/ML
INJECTION, SOLUTION EPIDURAL; INFILTRATION; INTRACAUDAL; PERINEURAL
Status: DISCONTINUED | OUTPATIENT
Start: 2020-02-07 | End: 2020-02-07 | Stop reason: HOSPADM

## 2020-02-07 RX ORDER — METHYLPREDNISOLONE ACETATE 40 MG/ML
INJECTION, SUSPENSION INTRA-ARTICULAR; INTRALESIONAL; INTRAMUSCULAR; SOFT TISSUE
Status: DISCONTINUED | OUTPATIENT
Start: 2020-02-07 | End: 2020-02-07 | Stop reason: HOSPADM

## 2020-02-07 RX ORDER — FENTANYL CITRATE 50 UG/ML
INJECTION, SOLUTION INTRAMUSCULAR; INTRAVENOUS
Status: DISCONTINUED | OUTPATIENT
Start: 2020-02-07 | End: 2020-02-07 | Stop reason: HOSPADM

## 2020-02-07 RX ORDER — MIDAZOLAM HYDROCHLORIDE 1 MG/ML
INJECTION, SOLUTION INTRAMUSCULAR; INTRAVENOUS
Status: DISCONTINUED | OUTPATIENT
Start: 2020-02-07 | End: 2020-02-07 | Stop reason: HOSPADM

## 2020-02-07 NOTE — TELEPHONE ENCOUNTER
----- Message from Joselin Souza MD sent at 2/7/2020  9:15 AM CST -----  Reviewed, nothing to do; repeat per routine

## 2020-02-07 NOTE — ASSESSMENT & PLAN NOTE
Problem: Pain Management  Goal: Pain level will decrease to patient's comfort goal  Outcome: PROGRESSING AS EXPECTED  Note:   Pt had new pain pump inserted on 01/28 by Dr. 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 She has completed the inpatient evaluation and approved for listing based on breast core biopsy schedule for Monday. Currently on Vitamin K IV and will need FFP on Monday morning.    Sodium trending down and diuretics stopped. Otherwise Cr, LFT's, and H/H remain stable.    Recommendations  -Approved for listing pending core needle breast biopsy  -IV VitK over weekend ; FFP Monday morning for goal INR < 2  -Core biopsy breast on Monday  -Repeat CXR and UA  -1.5 L fluid restriction  -Discontinue diuretics (done)  -Continue empiric fluconazole  -Continue lactulose and rifaximin and zinc  -Continue PPI and PRN GI cocktail
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
## 2020-02-07 NOTE — DISCHARGE INSTRUCTIONS

## 2020-02-07 NOTE — OP NOTE
PROCEDURE: Sacroiliac joint and Greater trochanteric bursa injection under fluoroscopic guidance       SIDE: bilateral Sacroiliac injection and bilateral greater trochanteric bursa injection      PROCEDURE DATE: 2/7/2020    PREOPERATIVE DIAGNOSIS: Sacroiliitis, greater trochanteric bursitis  POSTOPERATIVE DIAGNOSIS: Sacroiliitis, greater trochanteric bursitis    PROVIDER: KATJA Desai MD  Assistant(s): none    Anesthesia: Local, IV Sedation     >> 1 mg of VERSED    >> 50 mcg of FENTANYL     INDICATION: The patient has a history of pain due to sacroiliitis unresponsive to conservative treatments. Fluoroscopy was used to optimize visualization of needle placement and to maximize safety.       PROCEDURE DESCRIPTION: The patient was seen and identified in the preoperative area. Risks, benefits, complications, and alternatives were discussed with the patient. The patient agreed to proceed with the procedure and signed the consent. The site and side of the procedure was identified and marked.     The patient was taken to the procedural suite. The patient was positioned in prone orientation on procedure table. A time out was performed prior to any intervention. The procedure, site, side, and allergies were stated and agreed to by all present. The lumbosacral area extending to the skin overlying the femoral greater trochanter was widely prepped with ChloraPrep. The procedural site was draped in usual sterile fashion. Vital signs were closely monitored throughout this procedure.     The fluoroscopic camera was placed in contralateral oblique view and was adjusted until the anterior and posterior joint margins of the targeted sacroiliac joint aligned in linear array. The lower pole of the joint was identified, marked, and localized with 1% Lidocaine. A 22 gauge 5 inch spinal needle was introduced and advanced to the joint under fluoroscopic guidance. The joint space was entered and after negative aspiration, 2 mL of  injectate was posited into the joint space. The needle was then withdrawn outside of the joint space and after negative aspiration 1 mL of solution was injected outside of the joint space. The injectant solution used was comprised of 2 mL of 2% lidocaine and 1 mL of Methylprednisolone (40 mg/mL). This techniques was performed for the above noted joint/s.    Following Sacroiliac Joint injection, the stylet was replaced and the needle was withdrawn intact. The RIGHT greater trochanter was then identified with fluoroscopy. The targeted site of entry was localized with 1% PF Lidocaine. The above noted spinal needle was advanced to the lateral border of the greater trochanter until the osseus interface was met.  After negative aspiration, 1.5 mL of the above noted solution was injected. No pain or paresthesia was noted on injection. Following injection, the stylet was replaced and the needle was withdrawn intact. This technique was used for the above noted greater trochanteric bursa / bursae. The skin was cleaned and bandages were applied.    Description of Findings: Not applicable    Prosthetic devices, grafts, tissues, or devices implanted: None    Specimen Removed: No    Estimated Blood Loss: minimal    COMPLICATIONS: None    DISPOSITION / PLANS: The patient was transferred to the recovery area in a stable condition for observation. The patient was reexamined prior to discharge. There was no evidence of acute neurologic injury following the procedure.  Patient was discharged from the recovery room after meeting discharge criteria. Home discharge instructions were given to the patient by the staff.

## 2020-02-10 ENCOUNTER — OFFICE VISIT (OUTPATIENT)
Dept: SURGERY | Facility: CLINIC | Age: 55
End: 2020-02-10
Payer: MEDICAID

## 2020-02-10 ENCOUNTER — HOSPITAL ENCOUNTER (OUTPATIENT)
Dept: RADIOLOGY | Facility: HOSPITAL | Age: 55
Discharge: HOME | End: 2020-02-10
Attending: COLON & RECTAL SURGERY
Payer: MEDICAID

## 2020-02-10 VITALS
SYSTOLIC BLOOD PRESSURE: 158 MMHG | WEIGHT: 190.25 LBS | BODY MASS INDEX: 31.66 KG/M2 | TEMPERATURE: 98 F | HEART RATE: 80 BPM | DIASTOLIC BLOOD PRESSURE: 96 MMHG

## 2020-02-10 DIAGNOSIS — Z01.818 PRE-OP TESTING: ICD-10-CM

## 2020-02-10 DIAGNOSIS — K64.3 GRADE IV HEMORRHOIDS: Primary | ICD-10-CM

## 2020-02-10 DIAGNOSIS — D84.9 IMMUNOSUPPRESSED STATUS: ICD-10-CM

## 2020-02-10 DIAGNOSIS — K64.9 HEMORRHOID: ICD-10-CM

## 2020-02-10 DIAGNOSIS — Z01.818 PRE-OP TESTING: Primary | ICD-10-CM

## 2020-02-10 DIAGNOSIS — K62.5 RECTAL BLEED: ICD-10-CM

## 2020-02-10 PROCEDURE — 71046 XR CHEST PA AND LATERAL: ICD-10-PCS | Mod: 26,,, | Performed by: RADIOLOGY

## 2020-02-10 PROCEDURE — 99214 OFFICE O/P EST MOD 30 MIN: CPT | Mod: PBBFAC,25 | Performed by: COLON & RECTAL SURGERY

## 2020-02-10 PROCEDURE — 99214 PR OFFICE/OUTPT VISIT, EST, LEVL IV, 30-39 MIN: ICD-10-PCS | Mod: S$PBB,,, | Performed by: COLON & RECTAL SURGERY

## 2020-02-10 PROCEDURE — 99999 PR PBB SHADOW E&M-EST. PATIENT-LVL IV: CPT | Mod: PBBFAC,,, | Performed by: COLON & RECTAL SURGERY

## 2020-02-10 PROCEDURE — 99999 PR PBB SHADOW E&M-EST. PATIENT-LVL IV: ICD-10-PCS | Mod: PBBFAC,,, | Performed by: COLON & RECTAL SURGERY

## 2020-02-10 PROCEDURE — 99214 OFFICE O/P EST MOD 30 MIN: CPT | Mod: S$PBB,,, | Performed by: COLON & RECTAL SURGERY

## 2020-02-10 PROCEDURE — 71046 X-RAY EXAM CHEST 2 VIEWS: CPT | Mod: 26,,, | Performed by: RADIOLOGY

## 2020-02-10 PROCEDURE — 71046 X-RAY EXAM CHEST 2 VIEWS: CPT | Mod: TC

## 2020-02-10 RX ORDER — LIDOCAINE HYDROCHLORIDE 10 MG/ML
1 INJECTION, SOLUTION EPIDURAL; INFILTRATION; INTRACAUDAL; PERINEURAL ONCE
Status: DISCONTINUED | OUTPATIENT
Start: 2020-02-10 | End: 2020-02-18 | Stop reason: HOSPADM

## 2020-02-10 RX ORDER — SODIUM CHLORIDE 9 MG/ML
INJECTION, SOLUTION INTRAVENOUS CONTINUOUS
Status: CANCELLED | OUTPATIENT
Start: 2020-02-10

## 2020-02-10 RX ORDER — METRONIDAZOLE 500 MG/100ML
500 INJECTION, SOLUTION INTRAVENOUS
Status: CANCELLED | OUTPATIENT
Start: 2020-02-10

## 2020-02-10 RX ORDER — ONDANSETRON 2 MG/ML
4 INJECTION INTRAMUSCULAR; INTRAVENOUS EVERY 12 HOURS PRN
Status: CANCELLED | OUTPATIENT
Start: 2020-02-10

## 2020-02-10 NOTE — PROGRESS NOTES
History & Physical    SUBJECTIVE:     Chief Complaint   Patient presents with    Follow-up   Ref: DEMETRIUS Hodges    History of Present Illness:  Patient is a 54 y.o. female presents for evaluation of rectal bleeding and possible hemorrhoids.  Patient has had a liver transplant in December 2017 and has done well since that time.  However, after surgery she has noticed that she has bleeding with each bowel movement that occurs when wiping after bowel movements.  This bleeding is never painful.  She states the bleeding occurs each time she has a bowel movement around 4 times per day.  It has been consistent for 2 years now.  She states that she has a bowel movement 4 times per day, it is never hard, it is usually soft an loose, she takes no stool softeners and fiber supplementation.  She does drink at least 64 oz of water per day, does not have to strain to have bowel movement spends less than 5 min on the toilet per bowel movement.  She did undergo colonoscopy in December 2017 where no lesions were found but hemorrhoids were seen on exam.  She is on Prograf and CellCept for immunosuppression for her transplant.    Interval history:  Since last clinic visit, the patient scheduled for an operation but this had to be postponed due to an illness.  She has continued to have intermittent rectal bleeding with bowel movements and prolapsing tissue as before.  She is ready to undergo surgical excision.    Review of patient's allergies indicates:   Allergen Reactions    Ferrous sulfate Other (See Comments)     Patient states the pill makes her sick. She stated she would rather have a shot       Current Outpatient Medications   Medication Sig Dispense Refill    atorvastatin (LIPITOR) 40 MG tablet Take 1 tablet (40 mg total) by mouth every evening. 90 tablet 3    azelastine (ASTELIN) 137 mcg (0.1 %) nasal spray 2 sprays (274 mcg total) by Nasal route 2 (two) times daily as needed for Rhinitis. 30 mL 5    BIOTIN ORAL  Take by mouth every evening.       cyclobenzaprine (FLEXERIL) 10 MG tablet TAKE 1/2 TO 1 TABLET BY MOUTH EVERY NIGHT AT BEDTIME AS NEEDED FOR MUSCLE SPASMS. MAY CAUSE DROWSINESS 30 tablet 0    diclofenac sodium (VOLTAREN) 1 % Gel Apply 2 g topically 3 (three) times daily as needed. 2 Tube 6    ergocalciferol (ERGOCALCIFEROL) 50,000 unit Cap TAKE 1 CAPSULE BY MOUTH EVERY 7 DAYS 12 capsule 0    famotidine (PEPCID) 20 MG tablet Take 1 tablet (20 mg total) by mouth 2 (two) times daily. 60 tablet 5    furosemide (LASIX) 40 MG tablet TAKE 1 TABLET(40 MG) BY MOUTH EVERY DAY 30 tablet 0    gabapentin (NEURONTIN) 300 MG capsule TAKE 2 CAPSULES(600 MG) BY MOUTH TWICE DAILY 120 capsule 2    levocetirizine (XYZAL) 5 MG tablet TAKE 1 TABLET(5 MG) BY MOUTH EVERY EVENING 30 tablet 0    magnesium oxide (MAG-OX) 400 mg (241.3 mg magnesium) tablet Take 2 tablets (800 mg total) by mouth 2 (two) times daily. 120 tablet 6    methocarbamol (ROBAXIN) 750 MG Tab TAKE 1 TABLET BY MOUTH TWICE DAILY AS NEEDED FOR MUSCLE SPASMS. 60 tablet 0    mycophenolate (CELLCEPT) 250 mg Cap   11    NIFEdipine (PROCARDIA-XL) 30 MG (OSM) 24 hr tablet Take 1 tablet (30 mg total) by mouth once daily. 90 tablet 3    ondansetron (ZOFRAN-ODT) 4 MG TbDL 1-2 tablets 3 times a day as needed for nausea 20 tablet 0    potassium chloride (KLOR-CON) 10 MEQ TbSR TAKE 2 TABLETS(20 MEQ) BY MOUTH EVERY DAY 60 tablet 2    tacrolimus (PROGRAF) 1 MG Cap Take 2 capsules (2 mg total) by mouth every morning AND 1 capsule (1 mg total) every evening. 120 capsule 11     Current Facility-Administered Medications   Medication Dose Route Frequency Provider Last Rate Last Dose    lidocaine (PF) 10 mg/ml (1%) injection 10 mg  1 mL Intradermal Once Alden Horowitz MD        lidocaine (PF) 10 mg/ml (1%) injection 10 mg  1 mL Intradermal Once Alden Horowitz MD           Past Medical History:   Diagnosis Date    Alcohol abuse     Alcoholic hepatitis     Anemia of  chronic disease     Arthritis     generialized    Cancer 12/26/2017    liver    Coagulopathy     Encounter for blood transfusion     End stage liver disease     History of alcohol abuse     Hyperlipidemia     Hypertension     Hyponatremia     Liver cirrhosis     Liver transplant candidate 12/26/2017    Obesity (BMI 30-39.9) 1/5/2016     Past Surgical History:   Procedure Laterality Date    BREAST BIOPSY Left     benign    CHOLECYSTECTOMY      COLONOSCOPY N/A 12/1/2017    Procedure: COLONOSCOPY;  Surgeon: Filemon Fuentes MD;  Location: [facility] ENDO (2ND FLR);  Service: Endoscopy;  Laterality: N/A;    COLONOSCOPY N/A 12/5/2017    Procedure: COLONOSCOPY;  Surgeon: José Chavira MD;  Location: [facility] ENDO (2ND FLR);  Service: Endoscopy;  Laterality: N/A;    HYSTERECTOMY      complete     INJECTION OF ANESTHETIC AGENT INTO SACROILIAC JOINT Right 6/14/2019    Procedure: right Sacroiliac Joint Injection;  Surgeon: David Desai MD;  Location: [facility] PAIN MGT;  Service: Pain Management;  Laterality: Right;    INJECTION OF PIRIFORMIS MUSCLE Right 6/14/2019    Procedure: Right piriformis injection;  Surgeon: David Desai MD;  Location: [facility] PAIN MGT;  Service: Pain Management;  Laterality: Right;    LIVER TRANSPLANT  12/2017     Family History   Problem Relation Age of Onset    Hypertension Mother     Alzheimer's disease Mother     Hypertension Father     Diabetes Father     Diabetes Sister     Depression Maternal Grandfather     Breast cancer Paternal Aunt      Social History     Tobacco Use    Smoking status: Never Smoker    Smokeless tobacco: Never Used   Substance Use Topics    Alcohol use: No     Frequency: Never     Comment: Currently admitted to inpatient rehab 7/2017    Drug use: No        Review of Systems:  Review of Systems   Constitutional: Negative for activity change, appetite change, chills, fatigue, fever and unexpected weight change.   HENT: Negative for congestion, ear pain,  sore throat and trouble swallowing.    Eyes: Negative for pain, redness and itching.   Respiratory: Negative for cough, shortness of breath and wheezing.    Cardiovascular: Negative for chest pain, palpitations and leg swelling.   Gastrointestinal: Positive for anal bleeding. Negative for abdominal distention, abdominal pain, constipation, diarrhea, nausea, rectal pain and vomiting.   Endocrine: Negative for cold intolerance, heat intolerance and polyuria.   Genitourinary: Negative for dysuria, flank pain, frequency and hematuria.   Musculoskeletal: Negative for gait problem, joint swelling and neck pain.   Skin: Negative for color change, rash and wound.   Allergic/Immunologic: Positive for immunocompromised state. Negative for environmental allergies.   Neurological: Negative for dizziness, speech difficulty, weakness and numbness.   Psychiatric/Behavioral: Negative for agitation, confusion and hallucinations.       OBJECTIVE:     Vital Signs (Most Recent)  Temp: 97.5 °F (36.4 °C) (02/10/20 0845)  Pulse: 80 (02/10/20 0845)  BP: (!) 158/96 (02/10/20 0845)     86.3 kg (190 lb 4.1 oz)     Physical Exam:  Physical Exam   Constitutional: She is oriented to person, place, and time. She appears well-developed.   HENT:   Head: Normocephalic and atraumatic.   Eyes: Conjunctivae and EOM are normal.   Neck: Normal range of motion. No thyromegaly present.   Cardiovascular: Normal rate and regular rhythm.   Pulmonary/Chest: Effort normal. No respiratory distress.   Abdominal: Soft. She exhibits no distension and no mass. There is no tenderness.   Genitourinary:   Genitourinary Comments: Anorectal: +prolapsed tissues consistent with Grade IV right posterolateral hemorrhoids with polypoid features which are nontender and have evidence of recent bleeding; MIGUE with good tone, no blood, no palpable masses or defects   Musculoskeletal: Normal range of motion. She exhibits no edema or tenderness.   Neurological: She is alert and  oriented to person, place, and time.   Skin: Skin is warm and dry. Capillary refill takes less than 2 seconds. No rash noted.   Psychiatric: She has a normal mood and affect.     Anoscopy Procedure Note    Pre-procedure diagnosis: Rectal bleeding    Post-procedure diagnosis: Internal hemorrhoids    Procedure: Anoscopy    Surgeon: Alden Horowitz MD    Assistant: Jaylene Schneider MA    Specimen: none    Findings:  Anoscope inserted all 4 quadrants examined. Tissue along the right posterolateral border consistent with a prolapsed internal hemorrhoid with evidence of polypoid prolapse changes and evidence of recent bleeding. Mild enlargement of the right anterior >left lateral.  No evidence of recent bleeding from these columns.  No other abnormalities or defects noted.     Patient tolerated procedure well.      Laboratory  Lab Results   Component Value Date    WBC 11.82 02/04/2020    HGB 12.4 02/04/2020    HCT 40.2 02/04/2020    PLT 78 (L) 02/04/2020    CHOL 214 (H) 11/04/2019    TRIG 78 11/04/2019    HDL 55 11/04/2019    ALT 7 (L) 02/04/2020    AST 9 (L) 02/04/2020     02/04/2020    K 3.7 02/04/2020     02/04/2020    CREATININE 0.8 02/04/2020    BUN 8 02/04/2020    CO2 25 02/04/2020    TSH 1.051 09/19/2018    INR 1.0 03/09/2018    HGBA1C 4.2 03/01/2018         Diagnostic Results:  Reviewed previous colonoscopy report showing no polyps found and hemorrhoids seen.      ASSESSMENT/PLAN:     54-year-old female with a 2 year history of bleeding after bowel movements when wiping with exam consistent today with possible prolapsed internal hemorrhoid versus abnormal growth    - Rediscussed importance of surgical resection given immunosuppression and possibility of pre cancerous or cancerous growth.  We again discussed this is most likely a prolapsed hemorrhoid but need to prove it, as well as this is likely source of her bleeding after bowel movements.  Patient is agreeable to proceed  - All risks, benefits and  alternatives fully explained to patient.  Risks include, but are not limited to, bleeding, infection, fecal incontinence, damage to the sphincter muscles, postoperative abscess, postoperative pain, urinary incontinence, urinary retention, perioperative MI, CVA and death.  All questions appropriately answered to patient's satisfaction.  Consent signed and placed on chart.  - 2 enemas the morning of surgery  - RTC postop    Alden Horowitz MD  Colon and Rectal Surgery  [facility]

## 2020-02-10 NOTE — H&P (VIEW-ONLY)
History & Physical    SUBJECTIVE:     Chief Complaint   Patient presents with    Follow-up   Ref: DEMETRIUS Hodges    History of Present Illness:  Patient is a 54 y.o. female presents for evaluation of rectal bleeding and possible hemorrhoids.  Patient has had a liver transplant in December 2017 and has done well since that time.  However, after surgery she has noticed that she has bleeding with each bowel movement that occurs when wiping after bowel movements.  This bleeding is never painful.  She states the bleeding occurs each time she has a bowel movement around 4 times per day.  It has been consistent for 2 years now.  She states that she has a bowel movement 4 times per day, it is never hard, it is usually soft an loose, she takes no stool softeners and fiber supplementation.  She does drink at least 64 oz of water per day, does not have to strain to have bowel movement spends less than 5 min on the toilet per bowel movement.  She did undergo colonoscopy in December 2017 where no lesions were found but hemorrhoids were seen on exam.  She is on Prograf and CellCept for immunosuppression for her transplant.    Interval history:  Since last clinic visit, the patient scheduled for an operation but this had to be postponed due to an illness.  She has continued to have intermittent rectal bleeding with bowel movements and prolapsing tissue as before.  She is ready to undergo surgical excision.    Review of patient's allergies indicates:   Allergen Reactions    Ferrous sulfate Other (See Comments)     Patient states the pill makes her sick. She stated she would rather have a shot       Current Outpatient Medications   Medication Sig Dispense Refill    atorvastatin (LIPITOR) 40 MG tablet Take 1 tablet (40 mg total) by mouth every evening. 90 tablet 3    azelastine (ASTELIN) 137 mcg (0.1 %) nasal spray 2 sprays (274 mcg total) by Nasal route 2 (two) times daily as needed for Rhinitis. 30 mL 5    BIOTIN ORAL  Take by mouth every evening.       cyclobenzaprine (FLEXERIL) 10 MG tablet TAKE 1/2 TO 1 TABLET BY MOUTH EVERY NIGHT AT BEDTIME AS NEEDED FOR MUSCLE SPASMS. MAY CAUSE DROWSINESS 30 tablet 0    diclofenac sodium (VOLTAREN) 1 % Gel Apply 2 g topically 3 (three) times daily as needed. 2 Tube 6    ergocalciferol (ERGOCALCIFEROL) 50,000 unit Cap TAKE 1 CAPSULE BY MOUTH EVERY 7 DAYS 12 capsule 0    famotidine (PEPCID) 20 MG tablet Take 1 tablet (20 mg total) by mouth 2 (two) times daily. 60 tablet 5    furosemide (LASIX) 40 MG tablet TAKE 1 TABLET(40 MG) BY MOUTH EVERY DAY 30 tablet 0    gabapentin (NEURONTIN) 300 MG capsule TAKE 2 CAPSULES(600 MG) BY MOUTH TWICE DAILY 120 capsule 2    levocetirizine (XYZAL) 5 MG tablet TAKE 1 TABLET(5 MG) BY MOUTH EVERY EVENING 30 tablet 0    magnesium oxide (MAG-OX) 400 mg (241.3 mg magnesium) tablet Take 2 tablets (800 mg total) by mouth 2 (two) times daily. 120 tablet 6    methocarbamol (ROBAXIN) 750 MG Tab TAKE 1 TABLET BY MOUTH TWICE DAILY AS NEEDED FOR MUSCLE SPASMS. 60 tablet 0    mycophenolate (CELLCEPT) 250 mg Cap   11    NIFEdipine (PROCARDIA-XL) 30 MG (OSM) 24 hr tablet Take 1 tablet (30 mg total) by mouth once daily. 90 tablet 3    ondansetron (ZOFRAN-ODT) 4 MG TbDL 1-2 tablets 3 times a day as needed for nausea 20 tablet 0    potassium chloride (KLOR-CON) 10 MEQ TbSR TAKE 2 TABLETS(20 MEQ) BY MOUTH EVERY DAY 60 tablet 2    tacrolimus (PROGRAF) 1 MG Cap Take 2 capsules (2 mg total) by mouth every morning AND 1 capsule (1 mg total) every evening. 120 capsule 11     Current Facility-Administered Medications   Medication Dose Route Frequency Provider Last Rate Last Dose    lidocaine (PF) 10 mg/ml (1%) injection 10 mg  1 mL Intradermal Once Alden Horowitz MD        lidocaine (PF) 10 mg/ml (1%) injection 10 mg  1 mL Intradermal Once Alden Horowitz MD           Past Medical History:   Diagnosis Date    Alcohol abuse     Alcoholic hepatitis     Anemia of  chronic disease     Arthritis     generialized    Cancer 12/26/2017    liver    Coagulopathy     Encounter for blood transfusion     End stage liver disease     History of alcohol abuse     Hyperlipidemia     Hypertension     Hyponatremia     Liver cirrhosis     Liver transplant candidate 12/26/2017    Obesity (BMI 30-39.9) 1/5/2016     Past Surgical History:   Procedure Laterality Date    BREAST BIOPSY Left     benign    CHOLECYSTECTOMY      COLONOSCOPY N/A 12/1/2017    Procedure: COLONOSCOPY;  Surgeon: Filemon Fuentes MD;  Location: [facility] ENDO (2ND FLR);  Service: Endoscopy;  Laterality: N/A;    COLONOSCOPY N/A 12/5/2017    Procedure: COLONOSCOPY;  Surgeon: José Chavira MD;  Location: [facility] ENDO (2ND FLR);  Service: Endoscopy;  Laterality: N/A;    HYSTERECTOMY      complete     INJECTION OF ANESTHETIC AGENT INTO SACROILIAC JOINT Right 6/14/2019    Procedure: right Sacroiliac Joint Injection;  Surgeon: David Desai MD;  Location: [facility] PAIN MGT;  Service: Pain Management;  Laterality: Right;    INJECTION OF PIRIFORMIS MUSCLE Right 6/14/2019    Procedure: Right piriformis injection;  Surgeon: David Desai MD;  Location: [facility] PAIN MGT;  Service: Pain Management;  Laterality: Right;    LIVER TRANSPLANT  12/2017     Family History   Problem Relation Age of Onset    Hypertension Mother     Alzheimer's disease Mother     Hypertension Father     Diabetes Father     Diabetes Sister     Depression Maternal Grandfather     Breast cancer Paternal Aunt      Social History     Tobacco Use    Smoking status: Never Smoker    Smokeless tobacco: Never Used   Substance Use Topics    Alcohol use: No     Frequency: Never     Comment: Currently admitted to inpatient rehab 7/2017    Drug use: No        Review of Systems:  Review of Systems   Constitutional: Negative for activity change, appetite change, chills, fatigue, fever and unexpected weight change.   HENT: Negative for congestion, ear pain,  sore throat and trouble swallowing.    Eyes: Negative for pain, redness and itching.   Respiratory: Negative for cough, shortness of breath and wheezing.    Cardiovascular: Negative for chest pain, palpitations and leg swelling.   Gastrointestinal: Positive for anal bleeding. Negative for abdominal distention, abdominal pain, constipation, diarrhea, nausea, rectal pain and vomiting.   Endocrine: Negative for cold intolerance, heat intolerance and polyuria.   Genitourinary: Negative for dysuria, flank pain, frequency and hematuria.   Musculoskeletal: Negative for gait problem, joint swelling and neck pain.   Skin: Negative for color change, rash and wound.   Allergic/Immunologic: Positive for immunocompromised state. Negative for environmental allergies.   Neurological: Negative for dizziness, speech difficulty, weakness and numbness.   Psychiatric/Behavioral: Negative for agitation, confusion and hallucinations.       OBJECTIVE:     Vital Signs (Most Recent)  Temp: 97.5 °F (36.4 °C) (02/10/20 0845)  Pulse: 80 (02/10/20 0845)  BP: (!) 158/96 (02/10/20 0845)     86.3 kg (190 lb 4.1 oz)     Physical Exam:  Physical Exam   Constitutional: She is oriented to person, place, and time. She appears well-developed.   HENT:   Head: Normocephalic and atraumatic.   Eyes: Conjunctivae and EOM are normal.   Neck: Normal range of motion. No thyromegaly present.   Cardiovascular: Normal rate and regular rhythm.   Pulmonary/Chest: Effort normal. No respiratory distress.   Abdominal: Soft. She exhibits no distension and no mass. There is no tenderness.   Genitourinary:   Genitourinary Comments: Anorectal: +prolapsed tissues consistent with Grade IV right posterolateral hemorrhoids with polypoid features which are nontender and have evidence of recent bleeding; MIGUE with good tone, no blood, no palpable masses or defects   Musculoskeletal: Normal range of motion. She exhibits no edema or tenderness.   Neurological: She is alert and  oriented to person, place, and time.   Skin: Skin is warm and dry. Capillary refill takes less than 2 seconds. No rash noted.   Psychiatric: She has a normal mood and affect.     Anoscopy Procedure Note    Pre-procedure diagnosis: Rectal bleeding    Post-procedure diagnosis: Internal hemorrhoids    Procedure: Anoscopy    Surgeon: Alden Horowitz MD    Assistant: Jaylene Schneider MA    Specimen: none    Findings:  Anoscope inserted all 4 quadrants examined. Tissue along the right posterolateral border consistent with a prolapsed internal hemorrhoid with evidence of polypoid prolapse changes and evidence of recent bleeding. Mild enlargement of the right anterior >left lateral.  No evidence of recent bleeding from these columns.  No other abnormalities or defects noted.     Patient tolerated procedure well.      Laboratory  Lab Results   Component Value Date    WBC 11.82 02/04/2020    HGB 12.4 02/04/2020    HCT 40.2 02/04/2020    PLT 78 (L) 02/04/2020    CHOL 214 (H) 11/04/2019    TRIG 78 11/04/2019    HDL 55 11/04/2019    ALT 7 (L) 02/04/2020    AST 9 (L) 02/04/2020     02/04/2020    K 3.7 02/04/2020     02/04/2020    CREATININE 0.8 02/04/2020    BUN 8 02/04/2020    CO2 25 02/04/2020    TSH 1.051 09/19/2018    INR 1.0 03/09/2018    HGBA1C 4.2 03/01/2018         Diagnostic Results:  Reviewed previous colonoscopy report showing no polyps found and hemorrhoids seen.      ASSESSMENT/PLAN:     54-year-old female with a 2 year history of bleeding after bowel movements when wiping with exam consistent today with possible prolapsed internal hemorrhoid versus abnormal growth    - Rediscussed importance of surgical resection given immunosuppression and possibility of pre cancerous or cancerous growth.  We again discussed this is most likely a prolapsed hemorrhoid but need to prove it, as well as this is likely source of her bleeding after bowel movements.  Patient is agreeable to proceed  - All risks, benefits and  alternatives fully explained to patient.  Risks include, but are not limited to, bleeding, infection, fecal incontinence, damage to the sphincter muscles, postoperative abscess, postoperative pain, urinary incontinence, urinary retention, perioperative MI, CVA and death.  All questions appropriately answered to patient's satisfaction.  Consent signed and placed on chart.  - 2 enemas the morning of surgery  - RTC postop    Alden Horowitz MD  Colon and Rectal Surgery  [facility]

## 2020-02-11 ENCOUNTER — TELEPHONE (OUTPATIENT)
Dept: TRANSPLANT | Facility: CLINIC | Age: 55
End: 2020-02-11

## 2020-02-11 NOTE — DISCHARGE SUMMARY
[facility]  Short Stay  Discharge Summary    Admit Date: 2/7/2020    Discharge Date and Time: 2/7/2020  8:34 AM      Discharge Attending Physician: KATJA Desai MD     Hospital Course (synopsis of major diagnoses, care, treatment, and services provided during the course of the hospital stay): Patient was admitted to Pre-op where informed consent was signed.  The patient was then taken to the procedure suite where the procedure was performed.  The patient was then return to the Pre-Op area and discharge was performed.     Final Diagnoses:    Principal Problem: <principal problem not specified>   Secondary Diagnoses: There are no hospital problems to display for this patient.      Discharged Condition: good    Disposition: Home or Self Care    Follow up/Patient Instructions:    Medications:  Reconciled Home Medications:      Medication List      CONTINUE taking these medications    atorvastatin 40 MG tablet  Commonly known as:  LIPITOR  Take 1 tablet (40 mg total) by mouth every evening.     azelastine 137 mcg (0.1 %) nasal spray  Commonly known as:  ASTELIN  2 sprays (274 mcg total) by Nasal route 2 (two) times daily as needed for Rhinitis.     BIOTIN ORAL  Take by mouth every evening.     cyclobenzaprine 10 MG tablet  Commonly known as:  FLEXERIL  TAKE 1/2 TO 1 TABLET BY MOUTH EVERY NIGHT AT BEDTIME AS NEEDED FOR MUSCLE SPASMS. MAY CAUSE DROWSINESS     ergocalciferol 50,000 unit Cap  Commonly known as:  ERGOCALCIFEROL  TAKE 1 CAPSULE BY MOUTH EVERY 7 DAYS     famotidine 20 MG tablet  Commonly known as:  PEPCID  Take 1 tablet (20 mg total) by mouth 2 (two) times daily.     furosemide 40 MG tablet  Commonly known as:  LASIX  TAKE 1 TABLET(40 MG) BY MOUTH EVERY DAY     gabapentin 300 MG capsule  Commonly known as:  NEURONTIN  TAKE 2 CAPSULES(600 MG) BY MOUTH TWICE DAILY     levocetirizine 5 MG tablet  Commonly known as:  XYZAL  TAKE 1 TABLET(5 MG) BY MOUTH EVERY EVENING     magnesium oxide 400 mg  (241.3 mg magnesium) tablet  Commonly known as:  MAG-OX  Take 2 tablets (800 mg total) by mouth 2 (two) times daily.     methocarbamol 750 MG Tab  Commonly known as:  ROBAXIN  TAKE 1 TABLET BY MOUTH TWICE DAILY AS NEEDED FOR MUSCLE SPASMS.     mycophenolate 250 mg Cap  Commonly known as:  CELLCEPT     NIFEdipine 30 MG (OSM) 24 hr tablet  Commonly known as:  PROCARDIA-XL  Take 1 tablet (30 mg total) by mouth once daily.     ondansetron 4 MG Tbdl  Commonly known as:  ZOFRAN-ODT  1-2 tablets 3 times a day as needed for nausea     potassium chloride 10 MEQ Tbsr  Commonly known as:  KLOR-CON  TAKE 2 TABLETS(20 MEQ) BY MOUTH EVERY DAY     tacrolimus 1 MG Cap  Commonly known as:  PROGRAF  Take 2 capsules (2 mg total) by mouth every morning AND 1 capsule (1 mg total) every evening.     Voltaren 1 % Gel  Generic drug:  diclofenac sodium  Apply 2 g topically 3 (three) times daily as needed.          Discharge Procedure Orders   Diet general     Call MD for:  severe uncontrolled pain     Call MD for:  difficulty breathing, headache or visual disturbances     Call MD for:  redness, tenderness, or signs of infection (pain, swelling, redness, odor or green/yellow discharge around incision site)     Activity as tolerated

## 2020-02-11 NOTE — TELEPHONE ENCOUNTER
----- Message from Carolyn Ellison sent at 2/11/2020  2:12 PM CST -----  Pt is calling stating she had some dental work done and was given medication to take she wants to know if its ok to take with her transplant    Pt contact [phone]

## 2020-02-12 ENCOUNTER — LAB VISIT (OUTPATIENT)
Dept: LAB | Facility: HOSPITAL | Age: 55
End: 2020-02-12
Attending: INTERNAL MEDICINE
Payer: MEDICAID

## 2020-02-12 DIAGNOSIS — E83.42 HYPOMAGNESEMIA: ICD-10-CM

## 2020-02-12 DIAGNOSIS — Z94.4 LIVER REPLACED BY TRANSPLANT: ICD-10-CM

## 2020-02-12 LAB
BASOPHILS # BLD AUTO: 0.02 K/UL (ref 0–0.2)
BASOPHILS NFR BLD: 0.1 % (ref 0–1.9)
DIFFERENTIAL METHOD: ABNORMAL
EOSINOPHIL # BLD AUTO: 0 K/UL (ref 0–0.5)
EOSINOPHIL NFR BLD: 0.2 % (ref 0–8)
ERYTHROCYTE [DISTWIDTH] IN BLOOD BY AUTOMATED COUNT: 15.7 % (ref 11.5–14.5)
HCT VFR BLD AUTO: 41.9 % (ref 37–48.5)
HGB BLD-MCNC: 12.5 G/DL (ref 12–16)
IMM GRANULOCYTES # BLD AUTO: 0.12 K/UL (ref 0–0.04)
IMM GRANULOCYTES NFR BLD AUTO: 0.7 % (ref 0–0.5)
LYMPHOCYTES # BLD AUTO: 1.8 K/UL (ref 1–4.8)
LYMPHOCYTES NFR BLD: 10.1 % (ref 18–48)
MCH RBC QN AUTO: 24.9 PG (ref 27–31)
MCHC RBC AUTO-ENTMCNC: 29.8 G/DL (ref 32–36)
MCV RBC AUTO: 83 FL (ref 82–98)
MONOCYTES # BLD AUTO: 1 K/UL (ref 0.3–1)
MONOCYTES NFR BLD: 5.6 % (ref 4–15)
NEUTROPHILS # BLD AUTO: 15 K/UL (ref 1.8–7.7)
NEUTROPHILS NFR BLD: 83.3 % (ref 38–73)
NRBC BLD-RTO: 0 /100 WBC
PLATELET # BLD AUTO: 86 K/UL (ref 150–350)
PMV BLD AUTO: 12.3 FL (ref 9.2–12.9)
RBC # BLD AUTO: 5.03 M/UL (ref 4–5.4)
WBC # BLD AUTO: 17.97 K/UL (ref 3.9–12.7)

## 2020-02-12 PROCEDURE — 80053 COMPREHEN METABOLIC PANEL: CPT

## 2020-02-12 PROCEDURE — 85025 COMPLETE CBC W/AUTO DIFF WBC: CPT

## 2020-02-12 PROCEDURE — 83735 ASSAY OF MAGNESIUM: CPT

## 2020-02-12 PROCEDURE — 80197 ASSAY OF TACROLIMUS: CPT

## 2020-02-12 PROCEDURE — 36415 COLL VENOUS BLD VENIPUNCTURE: CPT

## 2020-02-13 LAB
ALBUMIN SERPL BCP-MCNC: 3.8 G/DL (ref 3.5–5.2)
ALP SERPL-CCNC: 206 U/L (ref 55–135)
ALT SERPL W/O P-5'-P-CCNC: 44 U/L (ref 10–44)
ANION GAP SERPL CALC-SCNC: 10 MMOL/L (ref 8–16)
AST SERPL-CCNC: 50 U/L (ref 10–40)
BILIRUB SERPL-MCNC: 0.5 MG/DL (ref 0.1–1)
BUN SERPL-MCNC: 13 MG/DL (ref 6–20)
CALCIUM SERPL-MCNC: 9.3 MG/DL (ref 8.7–10.5)
CHLORIDE SERPL-SCNC: 101 MMOL/L (ref 95–110)
CO2 SERPL-SCNC: 28 MMOL/L (ref 23–29)
CREAT SERPL-MCNC: 0.8 MG/DL (ref 0.5–1.4)
EST. GFR  (AFRICAN AMERICAN): >60 ML/MIN/1.73 M^2
EST. GFR  (NON AFRICAN AMERICAN): >60 ML/MIN/1.73 M^2
GLUCOSE SERPL-MCNC: 80 MG/DL (ref 70–110)
MAGNESIUM SERPL-MCNC: 1.8 MG/DL (ref 1.6–2.6)
POTASSIUM SERPL-SCNC: 4.4 MMOL/L (ref 3.5–5.1)
PROT SERPL-MCNC: 7.5 G/DL (ref 6–8.4)
SODIUM SERPL-SCNC: 139 MMOL/L (ref 136–145)
TACROLIMUS BLD-MCNC: 3.5 NG/ML (ref 5–15)

## 2020-02-13 NOTE — PRE ADMISSION SCREENING
To confirm, Your doctor has instructed you that surgery is scheduled for 2/18/2020 at  0700.       Please report to [facility], [facility], 1st floor, main lobby by 0530.  Pre admit office will call afternoon prior to surgery with final arrival time    INSTRUCTIONS IMPORTANT!!!   Do not eat, drink, or smoke after 12 midnight, prior to surgery, including water. OK to brush teeth, no gum, candy or mints!    ¨ Take only these medicines with a small swallow of water-morning of surgery.  Pepcid, neurontin, cellcept, prograf, procardia    Pre operative instructions:  Please review the Pre-Operative Instruction booklet that you were given.        Bathing Instructions--See page 6 in the Pre-operative booklet.      Prevention of surgical site infections:     -Keep incisions clean and dry.   -Do not soak/submerge incisions in water until completely healed.   -Do not apply lotions, powders, creams, or deodorants to site.   -Always make sure hands are cleaned with antibacterial soap/ alcohol-based                 prior to touching the surgical site.  (This includes doctors,                 nurses, staff, and yourself.)    Signs and symptoms:   -Redness and pain around the area where you had surgery   -Drainage of cloudy fluid from your surgical wound   -Fever over 100.4       I have read or had read and explained to me, and understand the above information.  Additional comments or instructions:  Received a copy of Pre-operative instructions booklet, FAQ surgical site infection sheet, and packets of hibiclens (if indicated).

## 2020-02-14 DIAGNOSIS — Z94.4 LIVER REPLACED BY TRANSPLANT: ICD-10-CM

## 2020-02-14 NOTE — TELEPHONE ENCOUNTER
----- Message from Joselin Souaz MD sent at 2/14/2020  4:23 PM CST -----  Tacro low and liver tests elevated increase tacro to 2mg twice a day, wbc count up need to see if patient has been sick. Repeat labs net week.

## 2020-02-14 NOTE — TELEPHONE ENCOUNTER
Labs reviewed by Dr. Souza_ increase Tac to 2 mg Bid and repeat labs 2/17/20. Pt states she have issues with N/V and weakness off and on . Pt has followed up with pcp but no findings. Pt is doing fine today.

## 2020-02-17 ENCOUNTER — LAB VISIT (OUTPATIENT)
Dept: LAB | Facility: HOSPITAL | Age: 55
End: 2020-02-17
Attending: INTERNAL MEDICINE
Payer: MEDICAID

## 2020-02-17 DIAGNOSIS — E83.42 HYPOMAGNESEMIA: ICD-10-CM

## 2020-02-17 DIAGNOSIS — Z94.4 LIVER REPLACED BY TRANSPLANT: ICD-10-CM

## 2020-02-17 LAB
ALBUMIN SERPL BCP-MCNC: 3.8 G/DL (ref 3.5–5.2)
ALP SERPL-CCNC: 192 U/L (ref 55–135)
ALT SERPL W/O P-5'-P-CCNC: 11 U/L (ref 10–44)
ANION GAP SERPL CALC-SCNC: 10 MMOL/L (ref 8–16)
AST SERPL-CCNC: 8 U/L (ref 10–40)
BASOPHILS # BLD AUTO: 0.05 K/UL (ref 0–0.2)
BASOPHILS NFR BLD: 0.3 % (ref 0–1.9)
BILIRUB SERPL-MCNC: 0.4 MG/DL (ref 0.1–1)
BUN SERPL-MCNC: 19 MG/DL (ref 6–20)
CALCIUM SERPL-MCNC: 9.4 MG/DL (ref 8.7–10.5)
CHLORIDE SERPL-SCNC: 104 MMOL/L (ref 95–110)
CO2 SERPL-SCNC: 27 MMOL/L (ref 23–29)
CREAT SERPL-MCNC: 0.9 MG/DL (ref 0.5–1.4)
DIFFERENTIAL METHOD: ABNORMAL
EOSINOPHIL # BLD AUTO: 0.1 K/UL (ref 0–0.5)
EOSINOPHIL NFR BLD: 0.3 % (ref 0–8)
ERYTHROCYTE [DISTWIDTH] IN BLOOD BY AUTOMATED COUNT: 15.9 % (ref 11.5–14.5)
EST. GFR  (AFRICAN AMERICAN): >60 ML/MIN/1.73 M^2
EST. GFR  (NON AFRICAN AMERICAN): >60 ML/MIN/1.73 M^2
GLUCOSE SERPL-MCNC: 94 MG/DL (ref 70–110)
HCT VFR BLD AUTO: 44 % (ref 37–48.5)
HGB BLD-MCNC: 13.1 G/DL (ref 12–16)
IMM GRANULOCYTES # BLD AUTO: 0.09 K/UL (ref 0–0.04)
IMM GRANULOCYTES NFR BLD AUTO: 0.6 % (ref 0–0.5)
LYMPHOCYTES # BLD AUTO: 2.1 K/UL (ref 1–4.8)
LYMPHOCYTES NFR BLD: 13.3 % (ref 18–48)
MAGNESIUM SERPL-MCNC: 1.8 MG/DL (ref 1.6–2.6)
MCH RBC QN AUTO: 24.6 PG (ref 27–31)
MCHC RBC AUTO-ENTMCNC: 29.8 G/DL (ref 32–36)
MCV RBC AUTO: 83 FL (ref 82–98)
MONOCYTES # BLD AUTO: 0.9 K/UL (ref 0.3–1)
MONOCYTES NFR BLD: 5.7 % (ref 4–15)
NEUTROPHILS # BLD AUTO: 12.8 K/UL (ref 1.8–7.7)
NEUTROPHILS NFR BLD: 79.8 % (ref 38–73)
NRBC BLD-RTO: 0 /100 WBC
PLATELET # BLD AUTO: 111 K/UL (ref 150–350)
PMV BLD AUTO: 11.5 FL (ref 9.2–12.9)
POTASSIUM SERPL-SCNC: 4.2 MMOL/L (ref 3.5–5.1)
PROT SERPL-MCNC: 7.7 G/DL (ref 6–8.4)
RBC # BLD AUTO: 5.32 M/UL (ref 4–5.4)
SODIUM SERPL-SCNC: 141 MMOL/L (ref 136–145)
WBC # BLD AUTO: 15.99 K/UL (ref 3.9–12.7)

## 2020-02-17 PROCEDURE — 36415 COLL VENOUS BLD VENIPUNCTURE: CPT

## 2020-02-17 PROCEDURE — 83735 ASSAY OF MAGNESIUM: CPT

## 2020-02-17 PROCEDURE — 80053 COMPREHEN METABOLIC PANEL: CPT

## 2020-02-17 PROCEDURE — 80197 ASSAY OF TACROLIMUS: CPT

## 2020-02-17 PROCEDURE — 85025 COMPLETE CBC W/AUTO DIFF WBC: CPT

## 2020-02-17 RX ORDER — TACROLIMUS 1 MG/1
2 CAPSULE ORAL EVERY 12 HOURS
Qty: 120 CAPSULE | Refills: 11 | Status: SHIPPED | OUTPATIENT
Start: 2020-02-17 | End: 2020-06-10

## 2020-02-18 ENCOUNTER — TELEPHONE (OUTPATIENT)
Dept: SURGERY | Facility: CLINIC | Age: 55
End: 2020-02-18

## 2020-02-18 ENCOUNTER — HOSPITAL ENCOUNTER (OUTPATIENT)
Facility: HOSPITAL | Age: 55
Discharge: HOME | End: 2020-02-18
Attending: COLON & RECTAL SURGERY | Admitting: COLON & RECTAL SURGERY
Payer: MEDICAID

## 2020-02-18 DIAGNOSIS — K64.9 HEMORRHOID: ICD-10-CM

## 2020-02-18 DIAGNOSIS — K64.3 GRADE IV HEMORRHOIDS: ICD-10-CM

## 2020-02-18 DIAGNOSIS — K62.5 RECTAL BLEED: ICD-10-CM

## 2020-02-18 LAB — TACROLIMUS BLD-MCNC: 5.7 NG/ML (ref 5–15)

## 2020-02-18 PROCEDURE — C9290 INJ, BUPIVACAINE LIPOSOME: HCPCS | Performed by: COLON & RECTAL SURGERY

## 2020-02-18 PROCEDURE — 25000003 PHARM REV CODE 250: Performed by: COLON & RECTAL SURGERY

## 2020-02-18 PROCEDURE — 88304 TISSUE EXAM BY PATHOLOGIST: CPT | Performed by: PATHOLOGY

## 2020-02-18 PROCEDURE — 63600175 PHARM REV CODE 636 W HCPCS: Performed by: NURSE ANESTHETIST, CERTIFIED REGISTERED

## 2020-02-18 PROCEDURE — S0030 INJECTION, METRONIDAZOLE: HCPCS | Performed by: COLON & RECTAL SURGERY

## 2020-02-18 PROCEDURE — 63600175 PHARM REV CODE 636 W HCPCS: Performed by: COLON & RECTAL SURGERY

## 2020-02-18 PROCEDURE — 37000008 HC ANESTHESIA 1ST 15 MINUTES: Performed by: COLON & RECTAL SURGERY

## 2020-02-18 PROCEDURE — 46255 PR HEMORRHOIDECTOMY,INT/EXT,1 COLUMN/GROUP: ICD-10-PCS | Mod: ,,, | Performed by: COLON & RECTAL SURGERY

## 2020-02-18 PROCEDURE — 00902 ANES ANORECTAL PX: CPT | Performed by: COLON & RECTAL SURGERY

## 2020-02-18 PROCEDURE — 37000009 HC ANESTHESIA EA ADD 15 MINS: Performed by: COLON & RECTAL SURGERY

## 2020-02-18 PROCEDURE — 36000707: Performed by: COLON & RECTAL SURGERY

## 2020-02-18 PROCEDURE — 36000706: Performed by: COLON & RECTAL SURGERY

## 2020-02-18 PROCEDURE — 88304 PR  SURG PATH,LEVEL III: ICD-10-PCS | Mod: 26,,, | Performed by: PATHOLOGY

## 2020-02-18 PROCEDURE — 88304 TISSUE EXAM BY PATHOLOGIST: CPT | Mod: 26,,, | Performed by: PATHOLOGY

## 2020-02-18 PROCEDURE — 46255 REMOVE INT/EXT HEM 1 GROUP: CPT | Mod: ,,, | Performed by: COLON & RECTAL SURGERY

## 2020-02-18 PROCEDURE — 71000033 HC RECOVERY, INTIAL HOUR: Performed by: COLON & RECTAL SURGERY

## 2020-02-18 PROCEDURE — 71000015 HC POSTOP RECOV 1ST HR: Performed by: COLON & RECTAL SURGERY

## 2020-02-18 PROCEDURE — 25000003 PHARM REV CODE 250: Performed by: NURSE ANESTHETIST, CERTIFIED REGISTERED

## 2020-02-18 RX ORDER — METRONIDAZOLE 500 MG/100ML
500 INJECTION, SOLUTION INTRAVENOUS
Status: COMPLETED | OUTPATIENT
Start: 2020-02-18 | End: 2020-02-18

## 2020-02-18 RX ORDER — SODIUM CHLORIDE 9 MG/ML
INJECTION, SOLUTION INTRAVENOUS CONTINUOUS
Status: DISCONTINUED | OUTPATIENT
Start: 2020-02-18 | End: 2020-02-18 | Stop reason: HOSPADM

## 2020-02-18 RX ORDER — ONDANSETRON 2 MG/ML
4 INJECTION INTRAMUSCULAR; INTRAVENOUS DAILY PRN
Status: DISCONTINUED | OUTPATIENT
Start: 2020-02-18 | End: 2020-02-18 | Stop reason: HOSPADM

## 2020-02-18 RX ORDER — LIDOCAINE HYDROCHLORIDE 10 MG/ML
INJECTION, SOLUTION EPIDURAL; INFILTRATION; INTRACAUDAL; PERINEURAL
Status: DISCONTINUED | OUTPATIENT
Start: 2020-02-18 | End: 2020-02-18

## 2020-02-18 RX ORDER — ROCURONIUM BROMIDE 10 MG/ML
INJECTION, SOLUTION INTRAVENOUS
Status: DISCONTINUED | OUTPATIENT
Start: 2020-02-18 | End: 2020-02-18

## 2020-02-18 RX ORDER — NEOSTIGMINE METHYLSULFATE 1 MG/ML
INJECTION, SOLUTION INTRAVENOUS
Status: DISCONTINUED | OUTPATIENT
Start: 2020-02-18 | End: 2020-02-18

## 2020-02-18 RX ORDER — GLYCOPYRROLATE 0.2 MG/ML
INJECTION INTRAMUSCULAR; INTRAVENOUS
Status: DISCONTINUED | OUTPATIENT
Start: 2020-02-18 | End: 2020-02-18

## 2020-02-18 RX ORDER — BUPIVACAINE HYDROCHLORIDE 2.5 MG/ML
INJECTION, SOLUTION EPIDURAL; INFILTRATION; INTRACAUDAL
Status: DISCONTINUED | OUTPATIENT
Start: 2020-02-18 | End: 2020-02-18 | Stop reason: HOSPADM

## 2020-02-18 RX ORDER — HYDROCODONE BITARTRATE AND ACETAMINOPHEN 10; 325 MG/1; MG/1
1 TABLET ORAL EVERY 4 HOURS PRN
Status: DISCONTINUED | OUTPATIENT
Start: 2020-02-18 | End: 2020-02-18 | Stop reason: HOSPADM

## 2020-02-18 RX ORDER — OXYCODONE AND ACETAMINOPHEN 5; 325 MG/1; MG/1
1 TABLET ORAL EVERY 4 HOURS PRN
Qty: 25 TABLET | Refills: 0 | Status: SHIPPED | OUTPATIENT
Start: 2020-02-18 | End: 2020-05-28 | Stop reason: ALTCHOICE

## 2020-02-18 RX ORDER — SODIUM CHLORIDE, SODIUM LACTATE, POTASSIUM CHLORIDE, CALCIUM CHLORIDE 600; 310; 30; 20 MG/100ML; MG/100ML; MG/100ML; MG/100ML
INJECTION, SOLUTION INTRAVENOUS CONTINUOUS PRN
Status: DISCONTINUED | OUTPATIENT
Start: 2020-02-18 | End: 2020-02-18

## 2020-02-18 RX ORDER — FENTANYL CITRATE 50 UG/ML
25 INJECTION, SOLUTION INTRAMUSCULAR; INTRAVENOUS EVERY 5 MIN PRN
Status: DISCONTINUED | OUTPATIENT
Start: 2020-02-18 | End: 2020-02-18 | Stop reason: HOSPADM

## 2020-02-18 RX ORDER — PROPOFOL 10 MG/ML
VIAL (ML) INTRAVENOUS
Status: DISCONTINUED | OUTPATIENT
Start: 2020-02-18 | End: 2020-02-18

## 2020-02-18 RX ORDER — MIDAZOLAM HYDROCHLORIDE 1 MG/ML
INJECTION, SOLUTION INTRAMUSCULAR; INTRAVENOUS
Status: DISCONTINUED | OUTPATIENT
Start: 2020-02-18 | End: 2020-02-18

## 2020-02-18 RX ORDER — ONDANSETRON 2 MG/ML
4 INJECTION INTRAMUSCULAR; INTRAVENOUS EVERY 12 HOURS PRN
Status: DISCONTINUED | OUTPATIENT
Start: 2020-02-18 | End: 2020-02-18 | Stop reason: HOSPADM

## 2020-02-18 RX ORDER — HYDROCODONE BITARTRATE AND ACETAMINOPHEN 5; 325 MG/1; MG/1
1 TABLET ORAL EVERY 4 HOURS PRN
Status: DISCONTINUED | OUTPATIENT
Start: 2020-02-18 | End: 2020-02-18 | Stop reason: HOSPADM

## 2020-02-18 RX ORDER — AMOXICILLIN 250 MG
1 CAPSULE ORAL 2 TIMES DAILY
Qty: 28 TABLET | Refills: 0 | Status: SHIPPED | OUTPATIENT
Start: 2020-02-18 | End: 2021-01-27

## 2020-02-18 RX ORDER — ONDANSETRON 2 MG/ML
INJECTION INTRAMUSCULAR; INTRAVENOUS
Status: DISCONTINUED | OUTPATIENT
Start: 2020-02-18 | End: 2020-02-18

## 2020-02-18 RX ORDER — FENTANYL CITRATE 50 UG/ML
INJECTION, SOLUTION INTRAMUSCULAR; INTRAVENOUS
Status: DISCONTINUED | OUTPATIENT
Start: 2020-02-18 | End: 2020-02-18

## 2020-02-18 RX ADMIN — LIDOCAINE HYDROCHLORIDE 50 MG: 10 INJECTION, SOLUTION EPIDURAL; INFILTRATION; INTRACAUDAL; PERINEURAL at 07:02

## 2020-02-18 RX ADMIN — PROPOFOL 100 MG: 10 INJECTION, EMULSION INTRAVENOUS at 07:02

## 2020-02-18 RX ADMIN — FENTANYL CITRATE 100 MCG: 50 INJECTION, SOLUTION INTRAMUSCULAR; INTRAVENOUS at 07:02

## 2020-02-18 RX ADMIN — METRONIDAZOLE 500 MG: 500 SOLUTION INTRAVENOUS at 08:02

## 2020-02-18 RX ADMIN — NEOSTIGMINE METHYLSULFATE 5 MG: 1 INJECTION INTRAVENOUS at 08:02

## 2020-02-18 RX ADMIN — BUPIVACAINE 266 MG: 13.3 INJECTION, SUSPENSION, LIPOSOMAL INFILTRATION at 07:02

## 2020-02-18 RX ADMIN — PROPOFOL 50 MG: 10 INJECTION, EMULSION INTRAVENOUS at 08:02

## 2020-02-18 RX ADMIN — SODIUM CHLORIDE, SODIUM LACTATE, POTASSIUM CHLORIDE, AND CALCIUM CHLORIDE: 600; 310; 30; 20 INJECTION, SOLUTION INTRAVENOUS at 07:02

## 2020-02-18 RX ADMIN — ROCURONIUM BROMIDE 30 MG: 10 INJECTION, SOLUTION INTRAVENOUS at 07:02

## 2020-02-18 RX ADMIN — CEFTRIAXONE SODIUM 2 G: 2 INJECTION, SOLUTION INTRAVENOUS at 08:02

## 2020-02-18 RX ADMIN — ONDANSETRON 4 MG: 2 INJECTION, SOLUTION INTRAMUSCULAR; INTRAVENOUS at 08:02

## 2020-02-18 RX ADMIN — MIDAZOLAM 2 MG: 1 INJECTION INTRAMUSCULAR; INTRAVENOUS at 07:02

## 2020-02-18 RX ADMIN — ROBINUL 0.8 MG: 0.2 INJECTION INTRAMUSCULAR; INTRAVENOUS at 08:02

## 2020-02-18 RX ADMIN — HYDROCODONE BITARTRATE AND ACETAMINOPHEN 1 TABLET: 10; 325 TABLET ORAL at 09:02

## 2020-02-18 NOTE — ANESTHESIA PREPROCEDURE EVALUATION
02/18/2020  Marcie Bazan is a 54 y.o., female.    Anesthesia Evaluation    I have reviewed the Patient Summary Reports.    I have reviewed the Nursing Notes.   I have reviewed the Medications.     Review of Systems  Anesthesia Hx:  No problems with previous Anesthesia Denies Hx of Anesthetic complications  Denies Family Hx of Anesthesia complications.   Denies Personal Hx of Anesthesia complications.   Social:  Alcohol Use  Alcohol Use:   Alcohol Abuse:   Cardiovascular:   Hypertension ECG has been reviewed.    Pulmonary:  Pulmonary Normal    Renal/:  Renal/ Normal     Hepatic/GI:   PUD, Liver Disease,    Musculoskeletal:   Arthritis     Neurological:   Neuromuscular Disease,    Endocrine:  Endocrine Normal    Psych:   Psychiatric History        Patient Active Problem List   Diagnosis    Allergic rhinitis    Anemia of chronic disease    Erosive esophagitis    Essential hypertension    History of abnormal cervical Pap smear    History of Helicobacter pylori infection    Lipoma    Chronic right-sided low back pain with sciatica    Multinodular thyroid    Hypomagnesemia    Liver replaced by transplant    Adrenal cortical steroids causing adverse effect in therapeutic use    At risk for opportunistic infections    Grade II hemorrhoids    Long-term use of immunosuppressant medication    Accelerated liver transplant rejection    Osteoarthritis of spine with radiculopathy, lumbar region    Vitamin D deficiency    Mixed hyperlipidemia    Adhesive capsulitis of both shoulders    SI (sacroiliac) pain    It band syndrome, left    Right leg pain    Piriformis syndrome of both sides    Greater trochanteric bursitis of both hips    Spondylosis of lumbar region without myelopathy or radiculopathy    Weakness of both hips    Obesity (BMI 30.0-34.9)    Difficulty walking    Muscle weakness  (generalized)    Chronic bilateral low back pain with sciatica    Sacroiliac joint dysfunction    Hemorrhoid     No current facility-administered medications on file prior to encounter.      Current Outpatient Medications on File Prior to Encounter   Medication Sig Dispense Refill    atorvastatin (LIPITOR) 40 MG tablet Take 1 tablet (40 mg total) by mouth every evening. 90 tablet 3    BIOTIN ORAL Take by mouth every evening.       cyclobenzaprine (FLEXERIL) 10 MG tablet TAKE 1/2 TO 1 TABLET BY MOUTH EVERY NIGHT AT BEDTIME AS NEEDED FOR MUSCLE SPASMS. MAY CAUSE DROWSINESS 30 tablet 0    famotidine (PEPCID) 20 MG tablet Take 1 tablet (20 mg total) by mouth 2 (two) times daily. 60 tablet 5    furosemide (LASIX) 40 MG tablet TAKE 1 TABLET(40 MG) BY MOUTH EVERY DAY 30 tablet 0    gabapentin (NEURONTIN) 300 MG capsule TAKE 2 CAPSULES(600 MG) BY MOUTH TWICE DAILY 120 capsule 2    levocetirizine (XYZAL) 5 MG tablet TAKE 1 TABLET(5 MG) BY MOUTH EVERY EVENING 30 tablet 0    magnesium oxide (MAG-OX) 400 mg (241.3 mg magnesium) tablet Take 2 tablets (800 mg total) by mouth 2 (two) times daily. 120 tablet 6    methocarbamol (ROBAXIN) 750 MG Tab TAKE 1 TABLET BY MOUTH TWICE DAILY AS NEEDED FOR MUSCLE SPASMS. 60 tablet 0    mycophenolate (CELLCEPT) 250 mg Cap   11    NIFEdipine (PROCARDIA-XL) 30 MG (OSM) 24 hr tablet Take 1 tablet (30 mg total) by mouth once daily. 90 tablet 3    potassium chloride (KLOR-CON) 10 MEQ TbSR TAKE 2 TABLETS(20 MEQ) BY MOUTH EVERY DAY 60 tablet 2    azelastine (ASTELIN) 137 mcg (0.1 %) nasal spray 2 sprays (274 mcg total) by Nasal route 2 (two) times daily as needed for Rhinitis. 30 mL 5    diclofenac sodium (VOLTAREN) 1 % Gel Apply 2 g topically 3 (three) times daily as needed. 2 Tube 6    ergocalciferol (ERGOCALCIFEROL) 50,000 unit Cap TAKE 1 CAPSULE BY MOUTH EVERY 7 DAYS 12 capsule 0    ondansetron (ZOFRAN-ODT) 4 MG TbDL 1-2 tablets 3 times a day as needed for nausea 20 tablet 0        Past Surgical History:   Procedure Laterality Date    BREAST BIOPSY Left     benign    CHOLECYSTECTOMY      COLONOSCOPY N/A 12/1/2017    Procedure: COLONOSCOPY;  Surgeon: Filemon Fuentes MD;  Location: [facility] ENDO ([address] [place] [address]);  Service: Endoscopy;  Laterality: N/A;    COLONOSCOPY N/A 12/5/2017    Procedure: COLONOSCOPY;  Surgeon: José Chavira MD;  [facility]  Service: Endoscopy;  Laterality: N/A;    HYSTERECTOMY      complete     INJECTION OF ANESTHETIC AGENT INTO SACROILIAC JOINT Right 6/14/2019    Procedure: right Sacroiliac Joint Injection;  Surgeon: David Desai MD;  Location: [facility] PAIN MGT;  Service: Pain Management;  Laterality: Right;    INJECTION OF ANESTHETIC AGENT INTO SACROILIAC JOINT Bilateral 2/7/2020    Procedure: Bilateral GT bursa + Bilateral Sacroiliac Joint Injection;  Surgeon: David Desai MD;  Location: [facility] PAIN MGT;  Service: Pain Management;  Laterality: Bilateral;    INJECTION OF JOINT Bilateral 2/7/2020    Procedure: Bilateral GT bursa + Bilateral Sacroiliac Joint Injection;  Surgeon: David Desai MD;  Location: [facility] PAIN MGT;  Service: Pain Management;  Laterality: Bilateral;    INJECTION OF PIRIFORMIS MUSCLE Right 6/14/2019    Procedure: Right piriformis injection;  Surgeon: David Desai MD;  Location: [facility] PAIN MGT;  Service: Pain Management;  Laterality: Right;    LIVER TRANSPLANT  12/2017         Physical Exam  General:  Well nourished    Airway/Jaw/Neck:  Airway Findings: Mouth Opening: Normal Tongue: Normal  General Airway Assessment: Adult  Mallampati: II  TM Distance: 4 - 6 cm  Jaw/Neck Findings:  Neck ROM: Normal ROM      Dental:  Dental Findings: In tact   Chest/Lungs:  Chest/Lungs Findings: Clear to auscultation, Normal Respiratory Rate     Heart/Vascular:  Heart Findings: Rate: Normal  Rhythm: Regular Rhythm  Sounds: Normal        Mental Status:  Mental Status Findings:  Cooperative, Alert and Oriented       Lab Results    Component Value Date    WBC 15.99 (H) 02/17/2020    HGB 13.1 02/17/2020    HCT 44.0 02/17/2020    MCV 83 02/17/2020     (L) 02/17/2020         Chemistry        Component Value Date/Time     02/17/2020 0911    K 4.2 02/17/2020 0911     02/17/2020 0911    CO2 27 02/17/2020 0911    BUN 19 02/17/2020 0911    CREATININE 0.9 02/17/2020 0911    GLU 94 02/17/2020 0911        Component Value Date/Time    CALCIUM 9.4 02/17/2020 0911    ALKPHOS 192 (H) 02/17/2020 0911    AST 8 (L) 02/17/2020 0911    ALT 11 02/17/2020 0911    BILITOT 0.4 02/17/2020 0911    ESTGFRAFRICA >60.0 02/17/2020 0911    EGFRNONAA >60.0 02/17/2020 0911        Normal sinus rhythm  Normal ECG  When compared with ECG of 09-MAR-2018 13:55,  Criteria for Septal infarct are no longer Present  Nonspecific T wave abnormality no longer evident in Anterior leads  Confirmed by MEL CRUZ, [facility] ([id number]) on 12/30/2019 12:22:15 PM      Anesthesia Plan  Type of Anesthesia, risks & benefits discussed:  Anesthesia Type:  general  Patient's Preference:   Intra-op Monitoring Plan: standard ASA monitors  Intra-op Monitoring Plan Comments:   Post Op Pain Control Plan: per primary service following discharge from PACU  Post Op Pain Control Plan Comments:   Induction:   IV  Beta Blocker:  Patient is not currently on a Beta-Blocker (No further documentation required).       Informed Consent: Patient understands risks and agrees with Anesthesia plan.  Questions answered. Anesthesia consent signed with patient.  ASA Score: 3     Day of Surgery Review of History & Physical: I have interviewed and examined the patient. I have reviewed the patient's H&P dated:  There are no significant changes.  H&P update referred to the surgeon.         Ready For Surgery From Anesthesia Perspective.

## 2020-02-18 NOTE — PLAN OF CARE
Gave home care instructions, verbalized understanding.  All questions answered to the satisfaction of both.  To POV via WC, into POV without difficulty, distress or assist.

## 2020-02-18 NOTE — OP NOTE
[facility]  Surgery Department  Operative Note    SUMMARY     Date of Procedure: 2/18/2020     Procedure: Procedure(s) (LRB):  HEMORRHOIDECTOMY (N/A)  BLOCK, NERVE, PUDENDAL (N/A)     Surgeon(s) and Role:     * Alden Horowitz MD - Primary    Assisting Surgeon: None    Pre-Operative Diagnosis: Grade IV hemorrhoids [K64.3]  Rectal bleed [K62.5]    Post-Operative Diagnosis: Post-Op Diagnosis Codes:     * Grade IV hemorrhoids [K64.3]     * Rectal bleed [K62.5]    Anesthesia: General    Technical Procedures Used:   1.  Excisional hemorrhoidectomy  2.  Pudendal nerve block  3.  Exam under anesthesia    Indications for Procedure:  54-year-old female with previous liver transplant with a new growth then possible prolapsed hemorrhoid versus mass from her anus who presents for definitive surgical management    Findings of the Procedure:  Enlarged right posterior lateral hemorrhoidal column with evidence of prolapse tissue on the anterior surface versus a secondary growth    Description of the Procedure: Patient was brought to the operating placed supine on table. General endotracheal anesthesia was then induced. Patient was then moved into lithotomy position. The perineum and anus were then prepped and draped usual sterile fashion. Preoperative surgical time-out was then performed confirming the correct patient, procedure and preop medications given.  A digital rectal exam was then performed confirming the prolapsed tissue in the right posterior lateral position as previously described on the clinic note.  The tissue appeared to be hemorrhoidal tissue with either prolapse changes anteriorly versus a secondary growth on the prolapsed tissue. A pudendal nerve block was then performed prior to any instrumentation.  A mixture of 20 cc of Exparel and 30 cc of 0.25% bupivacaine plain were used.  10 cc injected subdermally around the anal verge, 20 cc injected bilaterally in intersphincteric space and 20 cc  injected bilaterally into the ischial fossa where the pudendal nerve enters for a total of 50 cc given for the block. A lighted Hill-Dorantes retractor was then inserted at the anal canal in all 4 quadrants were examined.  There was model enlargement of the other hemorrhoidal columns but no growths or abnormalities that need to be addressed at this time.  Attention was then turned to the right posterior lateral position where the previously mentioned gross was found. The hemorrhoid ingrowth were grasped with an Allis clamp and cynthia it. A incision was scored around this hemorrhoidal column in growth in usual fashion. A 2-0 Vicryl suture was placed at the apex inside of the anus to ligate the blood supply. The incision was then started at the anal verge and carried underneath the hemorrhoidal column and growth in usual fashion using electrocautery taking care not to injure any underlying muscles.  Once this was completed up to the apex the lesion was excised and passed off the field for final pathology. The defect was then made hemostatic with electrocautery. The defect was then closed with a running 2-0 Vicryl suture that was previously placed at the apex to the anal verge.  The skin at the anal verge was then closed with interrupted 3-0 chromic suture with a small opening left for drainage. The anus and perineum were then checked for hemostasis was ensured.  Surgical dressings were applied.  The patient was then moved back into the supine position.  She was woken from general endotracheal anesthesia and taken to the postanesthesia care in stable condition.  She tolerated procedure well.  All sponge, needle and instrument counts were correct at the end of the case.    Significant Surgical Tasks Conducted by the Assistant(s), if Applicable: N/A    Complications: No    Estimated Blood Loss (EBL): 10mL           Implants: * No implants in log *    Specimens:   Specimen (12h ago, onward)    None                   Condition: Good    Disposition: PACU - hemodynamically stable.    Attestation: I performed the procedure.

## 2020-02-18 NOTE — TELEPHONE ENCOUNTER
"Called pt to schedule her Post Op appt with Dr Horowitz. - The person whom answered the call stated, "She just had surgery, but I will let her know you called."     ----- Message from Jenni Aaron sent at 2/18/2020  8:57 AM CST -----  Contact: nurse [facility]  Nurse called to schedule a 6 week post op appointment for pt however there was no availability. Pt can be reached at [phone]      Thanks,  Jenni Aaron        "

## 2020-02-18 NOTE — INTERVAL H&P NOTE
The patient has been examined and the H&P has been reviewed:    I concur with the findings and no changes have occurred since H&P was written.    Anesthesia/Surgery risks, benefits and alternative options discussed and understood by patient/family.          Active Hospital Problems    Diagnosis  POA    Hemorrhoid [K64.9]  Yes      Resolved Hospital Problems   No resolved problems to display.

## 2020-02-18 NOTE — DISCHARGE SUMMARY
[facility]  Discharge Note  Short Stay    Admit Date: 2/18/2020    Discharge Date and Time: 2/18/2020  9:47 AM     Attending Physician: Alden Horowitz     Discharge Provider: Alden Horowitz    Diagnoses:  Active Hospital Problems    Diagnosis  POA    Hemorrhoid [K64.9]  Yes      Resolved Hospital Problems   No resolved problems to display.       Discharged Condition: good    Hospital Course: Patient was admitted for an outpatient procedure and tolerated the procedure well with no complications.    Final Diagnoses: Same as principal problem.    Disposition: Home or Self Care    Follow up/Patient Instructions:    Medications:  Reconciled Home Medications:      Medication List      START taking these medications    oxyCODONE-acetaminophen 5-325 mg per tablet  Commonly known as:  PERCOCET  Take 1 tablet by mouth every 4 (four) hours as needed for Pain.     Stool Softener-Laxative 8.6-50 mg per tablet  Generic drug:  senna-docusate 8.6-50 mg  Take 1 tablet by mouth 2 (two) times daily. Take while taking narcotic pain medications        CONTINUE taking these medications    atorvastatin 40 MG tablet  Commonly known as:  LIPITOR  Take 1 tablet (40 mg total) by mouth every evening.     azelastine 137 mcg (0.1 %) nasal spray  Commonly known as:  ASTELIN  2 sprays (274 mcg total) by Nasal route 2 (two) times daily as needed for Rhinitis.     BIOTIN ORAL  Take by mouth every evening.     cyclobenzaprine 10 MG tablet  Commonly known as:  FLEXERIL  TAKE 1/2 TO 1 TABLET BY MOUTH EVERY NIGHT AT BEDTIME AS NEEDED FOR MUSCLE SPASMS. MAY CAUSE DROWSINESS     ergocalciferol 50,000 unit Cap  Commonly known as:  ERGOCALCIFEROL  TAKE 1 CAPSULE BY MOUTH EVERY 7 DAYS     famotidine 20 MG tablet  Commonly known as:  PEPCID  Take 1 tablet (20 mg total) by mouth 2 (two) times daily.     furosemide 40 MG tablet  Commonly known as:  LASIX  TAKE 1 TABLET(40 MG) BY MOUTH EVERY DAY     gabapentin 300 MG capsule  Commonly known as:   NEURONTIN  TAKE 2 CAPSULES(600 MG) BY MOUTH TWICE DAILY     levocetirizine 5 MG tablet  Commonly known as:  XYZAL  TAKE 1 TABLET(5 MG) BY MOUTH EVERY EVENING     magnesium oxide 400 mg (241.3 mg magnesium) tablet  Commonly known as:  MAG-OX  Take 2 tablets (800 mg total) by mouth 2 (two) times daily.     methocarbamol 750 MG Tab  Commonly known as:  ROBAXIN  TAKE 1 TABLET BY MOUTH TWICE DAILY AS NEEDED FOR MUSCLE SPASMS.     mycophenolate 250 mg Cap  Commonly known as:  CELLCEPT     NIFEdipine 30 MG (OSM) 24 hr tablet  Commonly known as:  PROCARDIA-XL  Take 1 tablet (30 mg total) by mouth once daily.     ondansetron 4 MG Tbdl  Commonly known as:  ZOFRAN-ODT  1-2 tablets 3 times a day as needed for nausea     potassium chloride 10 MEQ Tbsr  Commonly known as:  KLOR-CON  TAKE 2 TABLETS(20 MEQ) BY MOUTH EVERY DAY     tacrolimus 1 MG Cap  Commonly known as:  PROGRAF  Take 2 capsules (2 mg total) by mouth every 12 (twelve) hours.     Voltaren 1 % Gel  Generic drug:  diclofenac sodium  Apply 2 g topically 3 (three) times daily as needed.          Discharge Procedure Orders   Diet general     Call MD for:  extreme fatigue     Call MD for:  persistent dizziness or light-headedness     Call MD for:  hives     Call MD for:  redness, tenderness, or signs of infection (pain, swelling, redness, odor or green/yellow discharge around incision site)     Call MD for:  difficulty breathing, headache or visual disturbances     Call MD for:  severe uncontrolled pain     Call MD for:  persistent nausea and vomiting     Call MD for:  temperature >100.4     No dressing needed     Activity as tolerated     Follow-up Information     Alden Horowitz MD In 6 weeks.    Specialty:  Colon and Rectal Surgery  Why:  postop check  Contact information:  [address] [place] [address] DR Veronica GRANGER [id number]  [phone]                   Discharge Procedure Orders (must include Diet, Follow-up, Activity):   Discharge Procedure Orders (must include Diet,  Follow-up, Activity)   Diet general     Call MD for:  extreme fatigue     Call MD for:  persistent dizziness or light-headedness     Call MD for:  hives     Call MD for:  redness, tenderness, or signs of infection (pain, swelling, redness, odor or green/yellow discharge around incision site)     Call MD for:  difficulty breathing, headache or visual disturbances     Call MD for:  severe uncontrolled pain     Call MD for:  persistent nausea and vomiting     Call MD for:  temperature >100.4     No dressing needed     Activity as tolerated

## 2020-02-18 NOTE — TRANSFER OF CARE
"Anesthesia Transfer of Care Note    Patient: Marcie Bazan    Procedure(s) Performed: Procedure(s) (LRB):  HEMORRHOIDECTOMY (N/A)  BLOCK, NERVE, PUDENDAL (N/A)  EXAM UNDER ANESTHESIA (N/A)    Patient location: PACU    Anesthesia Type: general    Transport from OR: Transported from OR on room air with adequate spontaneous ventilation    Post pain: adequate analgesia    Post assessment: no apparent anesthetic complications    Post vital signs: stable    Level of consciousness: awake, alert and oriented    Nausea/Vomiting: no nausea/vomiting    Complications: none    Transfer of care protocol was followed      Last vitals:   Visit Vitals  BP (!) 146/73   Pulse 82   Temp 36.6 °C (97.9 °F) (Temporal)   Resp 16   Ht 5' 5" (1.651 m)   Wt 83.3 kg (183 lb 10.3 oz)   LMP 01/01/1985 (Approximate)   SpO2 99%   Breastfeeding? No   BMI 30.56 kg/m²     "

## 2020-02-19 NOTE — ANESTHESIA POSTPROCEDURE EVALUATION
Anesthesia Post Evaluation    Patient: Marcie Bazan    Procedure(s) Performed: Procedure(s) (LRB):  HEMORRHOIDECTOMY (N/A)  BLOCK, NERVE, PUDENDAL (N/A)  EXAM UNDER ANESTHESIA (N/A)    Final Anesthesia Type: general    Patient location during evaluation: PACU  Patient participation: Yes- Able to Participate  Level of consciousness: awake and alert  Post-procedure vital signs: reviewed and stable  Pain management: adequate  Airway patency: patent  BRANNON mitigation strategies: Extubation while patient is awake  PONV status at discharge: No PONV  Anesthetic complications: no      Cardiovascular status: hemodynamically stable  Respiratory status: spontaneous ventilation  Hydration status: euvolemic  Follow-up not needed.          Vitals Value Taken Time   /64 2/18/2020  9:35 AM   Temp 36.7 °C (98.1 °F) 2/18/2020  9:35 AM   Pulse 78 2/18/2020  9:35 AM   Resp 21 2/18/2020  9:35 AM   SpO2 99 % 2/18/2020  9:35 AM         Event Time     Out of Recovery 09:15:00          Pain/Larry Score: Pain Rating Prior to Med Admin: 5 (2/18/2020  9:02 AM)  Larry Score: 10 (2/18/2020  9:15 AM)

## 2020-02-20 VITALS
TEMPERATURE: 98 F | RESPIRATION RATE: 21 BRPM | BODY MASS INDEX: 30.59 KG/M2 | WEIGHT: 183.63 LBS | OXYGEN SATURATION: 99 % | HEART RATE: 78 BPM | SYSTOLIC BLOOD PRESSURE: 134 MMHG | HEIGHT: 65 IN | DIASTOLIC BLOOD PRESSURE: 64 MMHG

## 2020-02-20 LAB
FINAL PATHOLOGIC DIAGNOSIS: NORMAL
GROSS: NORMAL

## 2020-02-21 ENCOUNTER — TELEPHONE (OUTPATIENT)
Dept: TRANSPLANT | Facility: CLINIC | Age: 55
End: 2020-02-21

## 2020-02-21 NOTE — LETTER
February 21, 2020    Marcie Bazan  [address] [facility] [address]          Dear Marcie Bazan:  MRN: [id number]    This is a follow up to your recent labs, your lab results were stable.  There are no medicine changes.  Please have your labs drawn again on 4/13/20.      If you cannot have your labs drawn on the scheduled date, it is your responsibility to call the transplant department to reschedule.  To reschedule or make an appointment, please as to speak to or leave a message for my assistant, Yaquelin Denton or Mica, at [phone].  When leaving a message for Yaquelin Denton Angela or myself, we ask that you leave a brief message regarding your request.    Sincerely,        Your Liver Transplant Coordinator    [facility]  [address] [place] [id number]  [phone]

## 2020-02-21 NOTE — TELEPHONE ENCOUNTER
----- Message from Joselin Souza MD sent at 2/21/2020  3:58 PM CST -----  Patient with recent hemorrhoidal surgery.  Not sure this is driving of her white blood cell count but will continue to monitor.  Repeat labs per routine.

## 2020-02-27 ENCOUNTER — HOSPITAL ENCOUNTER (EMERGENCY)
Facility: HOSPITAL | Age: 55
Discharge: HOME | End: 2020-02-28
Attending: EMERGENCY MEDICINE
Payer: MEDICAID

## 2020-02-27 DIAGNOSIS — J10.1 INFLUENZA A: Primary | ICD-10-CM

## 2020-02-27 DIAGNOSIS — R50.9 FEVER: ICD-10-CM

## 2020-02-27 LAB
ALBUMIN SERPL BCP-MCNC: 3.5 G/DL (ref 3.5–5.2)
ALP SERPL-CCNC: 185 U/L (ref 55–135)
ALT SERPL W/O P-5'-P-CCNC: 11 U/L (ref 10–44)
ANION GAP SERPL CALC-SCNC: 10 MMOL/L (ref 8–16)
AST SERPL-CCNC: 11 U/L (ref 10–40)
BASOPHILS # BLD AUTO: 0.02 K/UL (ref 0–0.2)
BASOPHILS NFR BLD: 0.2 % (ref 0–1.9)
BILIRUB SERPL-MCNC: 0.4 MG/DL (ref 0.1–1)
BNP SERPL-MCNC: <10 PG/ML (ref 0–99)
BUN SERPL-MCNC: 13 MG/DL (ref 6–20)
CALCIUM SERPL-MCNC: 9.1 MG/DL (ref 8.7–10.5)
CHLORIDE SERPL-SCNC: 104 MMOL/L (ref 95–110)
CO2 SERPL-SCNC: 22 MMOL/L (ref 23–29)
CREAT SERPL-MCNC: 0.9 MG/DL (ref 0.5–1.4)
DIFFERENTIAL METHOD: ABNORMAL
EOSINOPHIL # BLD AUTO: 0.1 K/UL (ref 0–0.5)
EOSINOPHIL NFR BLD: 0.9 % (ref 0–8)
ERYTHROCYTE [DISTWIDTH] IN BLOOD BY AUTOMATED COUNT: 15.4 % (ref 11.5–14.5)
EST. GFR  (AFRICAN AMERICAN): >60 ML/MIN/1.73 M^2
EST. GFR  (NON AFRICAN AMERICAN): >60 ML/MIN/1.73 M^2
GLUCOSE SERPL-MCNC: 106 MG/DL (ref 70–110)
HCT VFR BLD AUTO: 38.4 % (ref 37–48.5)
HGB BLD-MCNC: 12.4 G/DL (ref 12–16)
IMM GRANULOCYTES # BLD AUTO: 0.06 K/UL (ref 0–0.04)
IMM GRANULOCYTES NFR BLD AUTO: 0.6 % (ref 0–0.5)
INR PPP: 1 (ref 0.8–1.2)
LACTATE SERPL-SCNC: 0.9 MMOL/L (ref 0.5–2.2)
LIPASE SERPL-CCNC: 8 U/L (ref 4–60)
LYMPHOCYTES # BLD AUTO: 1.2 K/UL (ref 1–4.8)
LYMPHOCYTES NFR BLD: 11.2 % (ref 18–48)
MAGNESIUM SERPL-MCNC: 1.5 MG/DL (ref 1.6–2.6)
MCH RBC QN AUTO: 25.2 PG (ref 27–31)
MCHC RBC AUTO-ENTMCNC: 32.3 G/DL (ref 32–36)
MCV RBC AUTO: 78 FL (ref 82–98)
MONOCYTES # BLD AUTO: 1 K/UL (ref 0.3–1)
MONOCYTES NFR BLD: 9.4 % (ref 4–15)
NEUTROPHILS # BLD AUTO: 8.1 K/UL (ref 1.8–7.7)
NEUTROPHILS NFR BLD: 77.7 % (ref 38–73)
NRBC BLD-RTO: 0 /100 WBC
PHOSPHATE SERPL-MCNC: 2.7 MG/DL (ref 2.7–4.5)
PLATELET # BLD AUTO: 244 K/UL (ref 150–350)
PMV BLD AUTO: 10 FL (ref 9.2–12.9)
POTASSIUM SERPL-SCNC: 3.8 MMOL/L (ref 3.5–5.1)
PROT SERPL-MCNC: 7.1 G/DL (ref 6–8.4)
PROTHROMBIN TIME: 10.6 SEC (ref 9–12.5)
RBC # BLD AUTO: 4.92 M/UL (ref 4–5.4)
SODIUM SERPL-SCNC: 136 MMOL/L (ref 136–145)
WBC # BLD AUTO: 10.35 K/UL (ref 3.9–12.7)

## 2020-02-27 PROCEDURE — 84145 PROCALCITONIN (PCT): CPT

## 2020-02-27 PROCEDURE — 83605 ASSAY OF LACTIC ACID: CPT

## 2020-02-27 PROCEDURE — 84484 ASSAY OF TROPONIN QUANT: CPT

## 2020-02-27 PROCEDURE — 86803 HEPATITIS C AB TEST: CPT

## 2020-02-27 PROCEDURE — 86703 HIV-1/HIV-2 1 RESULT ANTBDY: CPT

## 2020-02-27 PROCEDURE — 25000003 PHARM REV CODE 250: Performed by: EMERGENCY MEDICINE

## 2020-02-27 PROCEDURE — 87880 STREP A ASSAY W/OPTIC: CPT

## 2020-02-27 PROCEDURE — 87502 INFLUENZA DNA AMP PROBE: CPT

## 2020-02-27 PROCEDURE — 87040 BLOOD CULTURE FOR BACTERIA: CPT | Mod: 59

## 2020-02-27 PROCEDURE — 84100 ASSAY OF PHOSPHORUS: CPT

## 2020-02-27 PROCEDURE — 83880 ASSAY OF NATRIURETIC PEPTIDE: CPT

## 2020-02-27 PROCEDURE — 93010 EKG 12-LEAD: ICD-10-PCS | Mod: ,,, | Performed by: INTERNAL MEDICINE

## 2020-02-27 PROCEDURE — 83735 ASSAY OF MAGNESIUM: CPT

## 2020-02-27 PROCEDURE — 93005 ELECTROCARDIOGRAM TRACING: CPT

## 2020-02-27 PROCEDURE — 93010 ELECTROCARDIOGRAM REPORT: CPT | Mod: ,,, | Performed by: INTERNAL MEDICINE

## 2020-02-27 PROCEDURE — 96360 HYDRATION IV INFUSION INIT: CPT

## 2020-02-27 PROCEDURE — 85025 COMPLETE CBC W/AUTO DIFF WBC: CPT

## 2020-02-27 PROCEDURE — 87081 CULTURE SCREEN ONLY: CPT

## 2020-02-27 PROCEDURE — 81000 URINALYSIS NONAUTO W/SCOPE: CPT

## 2020-02-27 PROCEDURE — 99284 EMERGENCY DEPT VISIT MOD MDM: CPT | Mod: 25

## 2020-02-27 PROCEDURE — 80053 COMPREHEN METABOLIC PANEL: CPT

## 2020-02-27 PROCEDURE — 83690 ASSAY OF LIPASE: CPT

## 2020-02-27 PROCEDURE — 63600175 PHARM REV CODE 636 W HCPCS: Performed by: EMERGENCY MEDICINE

## 2020-02-27 PROCEDURE — 96361 HYDRATE IV INFUSION ADD-ON: CPT

## 2020-02-27 PROCEDURE — 85610 PROTHROMBIN TIME: CPT

## 2020-02-27 RX ORDER — IBUPROFEN 400 MG/1
400 TABLET ORAL
Status: COMPLETED | OUTPATIENT
Start: 2020-02-27 | End: 2020-02-27

## 2020-02-27 RX ADMIN — SODIUM CHLORIDE 2496 ML: 0.9 INJECTION, SOLUTION INTRAVENOUS at 11:02

## 2020-02-27 RX ADMIN — IBUPROFEN 400 MG: 400 TABLET, FILM COATED ORAL at 11:02

## 2020-02-28 VITALS
WEIGHT: 183.38 LBS | SYSTOLIC BLOOD PRESSURE: 99 MMHG | HEIGHT: 65 IN | BODY MASS INDEX: 30.55 KG/M2 | OXYGEN SATURATION: 97 % | TEMPERATURE: 99 F | DIASTOLIC BLOOD PRESSURE: 53 MMHG | RESPIRATION RATE: 19 BRPM | HEART RATE: 82 BPM

## 2020-02-28 PROBLEM — J10.1 INFLUENZA A: Status: ACTIVE | Noted: 2020-02-28

## 2020-02-28 LAB
BACTERIA #/AREA URNS HPF: NORMAL /HPF
BILIRUB UR QL STRIP: NEGATIVE
CLARITY UR: CLEAR
COLOR UR: YELLOW
DEPRECATED S PYO AG THROAT QL EIA: NEGATIVE
GLUCOSE UR QL STRIP: NEGATIVE
HCV AB SERPL QL IA: NEGATIVE
HGB UR QL STRIP: ABNORMAL
HIV 1+2 AB+HIV1 P24 AG SERPL QL IA: NEGATIVE
INFLUENZA A, MOLECULAR: POSITIVE
INFLUENZA B, MOLECULAR: NEGATIVE
KETONES UR QL STRIP: NEGATIVE
LEUKOCYTE ESTERASE UR QL STRIP: ABNORMAL
MICROSCOPIC COMMENT: NORMAL
NITRITE UR QL STRIP: NEGATIVE
PH UR STRIP: 7 [PH] (ref 5–8)
PROCALCITONIN SERPL IA-MCNC: 0.07 NG/ML
PROT UR QL STRIP: NEGATIVE
RBC #/AREA URNS HPF: 1 /HPF (ref 0–4)
SP GR UR STRIP: 1.01 (ref 1–1.03)
SPECIMEN SOURCE: ABNORMAL
SQUAMOUS #/AREA URNS HPF: 5 /HPF
TROPONIN I SERPL DL<=0.01 NG/ML-MCNC: <0.006 NG/ML (ref 0–0.03)
URN SPEC COLLECT METH UR: ABNORMAL
UROBILINOGEN UR STRIP-ACNC: NEGATIVE EU/DL
WBC #/AREA URNS HPF: 1 /HPF (ref 0–5)

## 2020-02-28 PROCEDURE — 25500020 PHARM REV CODE 255: Performed by: EMERGENCY MEDICINE

## 2020-02-28 PROCEDURE — 36415 COLL VENOUS BLD VENIPUNCTURE: CPT

## 2020-02-28 RX ORDER — OSELTAMIVIR PHOSPHATE 75 MG/1
75 CAPSULE ORAL 2 TIMES DAILY
Qty: 10 CAPSULE | Refills: 0 | Status: SHIPPED | OUTPATIENT
Start: 2020-02-28 | End: 2020-03-04

## 2020-02-28 RX ADMIN — IOHEXOL 75 ML: 350 INJECTION, SOLUTION INTRAVENOUS at 12:02

## 2020-02-28 NOTE — ED PROVIDER NOTES
SCRIBE #1 NOTE: I, Tiffanie Kebede, am scribing for, and in the presence of, Garret Suggs Jr., MD. I have scribed the entire note.      History      Chief Complaint   Patient presents with    General Illness     fever, cough, congestion, bodyaches, headache, fatigue, L otalgia, sore throat; since yesterday       Review of patient's allergies indicates:   Allergen Reactions    Ferrous sulfate Other (See Comments)     Patient states the pill makes her sick. She stated she would rather have a shot        HPI   HPI    2/27/2020, 11:17 PM    History obtained from the patient      History of Present Illness: Marcie Bazan is a 54 y.o. female patient who presents to the Emergency Department for evaluation of General Illness. Patient is c/o fever, a cough, sore throat, and myalgias. Symptoms onset x2 days ago following the pt being in contact with her grandchild who was experiencing similar sxs. Patient has an extensive history of liver problems and is a liver transplant patient. Patient denies any vomiting, diarrhea, dysuria, hematuria, CP, SOB, and all other sxs at this time. No prior Tx reported. Patient mentions a recent hemorrhoidectomy on 2/18 with no complaints or issues. No further complaints or concerns at this time.         Arrival mode: Personal vehicle      PCP: Jenny Suggs PA-C       Past Medical History:  Past Medical History:   Diagnosis Date    Alcohol abuse     Alcoholic hepatitis     Anemia of chronic disease     Arthritis     generialized    Cancer 12/26/2017    liver    Coagulopathy     Encounter for blood transfusion     End stage liver disease     History of alcohol abuse     Hyperlipidemia     Hypertension     Hyponatremia     Liver cirrhosis     Liver transplant candidate 12/26/2017    Obesity (BMI 30-39.9) 1/5/2016       Past Surgical History:   Past Surgical History:   Procedure Laterality Date    BREAST BIOPSY Left     benign    CHOLECYSTECTOMY      COLONOSCOPY N/A  12/1/2017    Procedure: COLONOSCOPY;  Surgeon: Filemon Fuentes MD;  [facility]  Service: Endoscopy;  Laterality: N/A;    COLONOSCOPY N/A 12/5/2017    Procedure: COLONOSCOPY;  Surgeon: José Chavira MD;  Location: [facility] ENDO (2ND FLR);  Service: Endoscopy;  Laterality: N/A;    EXAMINATION UNDER ANESTHESIA N/A 2/18/2020    Procedure: EXAM UNDER ANESTHESIA;  Surgeon: Alden Horowitz MD;  Location: [facility] OR;  Service: General;  Laterality: N/A;    EXCISIONAL HEMORRHOIDECTOMY N/A 2/18/2020    Procedure: HEMORRHOIDECTOMY;  Surgeon: Alden Horowitz MD;  Location: [facility] OR;  Service: General;  Laterality: N/A;    HYSTERECTOMY      complete     INJECTION OF ANESTHETIC AGENT AROUND PUDENDAL NERVE N/A 2/18/2020    Procedure: BLOCK, NERVE, PUDENDAL;  Surgeon: Alden Horowitz MD;  Location: [facility] OR;  Service: General;  Laterality: N/A;    INJECTION OF ANESTHETIC AGENT INTO SACROILIAC JOINT Right 6/14/2019    Procedure: right Sacroiliac Joint Injection;  Surgeon: David Desai MD;  Location: [facility] PAIN MGT;  Service: Pain Management;  Laterality: Right;    INJECTION OF ANESTHETIC AGENT INTO SACROILIAC JOINT Bilateral 2/7/2020    Procedure: Bilateral GT bursa + Bilateral Sacroiliac Joint Injection;  Surgeon: David Desai MD;  Location: [facility] PAIN MGT;  Service: Pain Management;  Laterality: Bilateral;    INJECTION OF JOINT Bilateral 2/7/2020    Procedure: Bilateral GT bursa + Bilateral Sacroiliac Joint Injection;  Surgeon: David Desai MD;  Location: [facility] PAIN [facility]GT;  Service: Pain Management;  Laterality: Bilateral;    INJECTION OF PIRIFORMIS MUSCLE Right 6/14/2019    Procedure: Right piriformis injection;  Surgeon: David Desai MD;  Location: [facility] PAIN MGT;  Service: Pain Management;  Laterality: Right;    LIVER TRANSPLANT  12/2017         Family History:  Family History   Problem Relation Age of Onset    Hypertension Mother     Alzheimer's disease Mother     Hypertension  Father     Diabetes Father     Diabetes Sister     Depression Maternal Grandfather     Breast cancer Paternal Aunt        Social History:  Social History     Tobacco Use    Smoking status: Never Smoker    Smokeless tobacco: Never Used   Substance and Sexual Activity    Alcohol use: No     Frequency: Never     Comment: Currently admitted to inpatient rehab 7/2017    Drug use: No    Sexual activity: Not Currently       ROS   Review of Systems   Constitutional: Positive for fever.   HENT: Positive for sore throat.    Respiratory: Positive for cough. Negative for shortness of breath.    Cardiovascular: Negative for chest pain.   Gastrointestinal: Negative for diarrhea and vomiting.   Genitourinary: Negative for dysuria and hematuria.   Musculoskeletal: Positive for myalgias. Negative for back pain.   Skin: Negative for rash.   Neurological: Negative for weakness.   Hematological: Does not bruise/bleed easily.   All other systems reviewed and are negative.    Physical Exam      Initial Vitals [02/27/20 2210]   BP Pulse Resp Temp SpO2   127/75 108 (!) 22 (!) 101.9 °F (38.8 °C) 97 %      MAP       --          Physical Exam  Nursing Notes and Vital Signs Reviewed.  Constitutional: Patient is in no acute distress. Patient appears ill.   Head: Atraumatic. Normocephalic.  Eyes: PERRL. EOM intact. Conjunctivae are not pale. No scleral icterus.  ENT: Mucous membranes are moist. Oropharynx is erythematous.  The Left TM is bulging, no erythema noted.  Neck: Supple. Full ROM. No lymphadenopathy.  Cardiovascular: Regular rate. Regular rhythm. No murmurs, rubs, or gallops. Distal pulses are 2+ and symmetric.  Pulmonary/Chest: No respiratory distress. Clear to auscultation bilaterally. No wheezing or rales.  Abdominal: Soft and non-distended.  There is generalized tenderness. No rebound or guarding.   Genitourinary: No CVA tenderness  Musculoskeletal: Moves all extremities. No obvious deformities. No edema.   Skin: Warm and  "dry.  Neurological:  Alert, awake, and appropriate.  Normal speech.  No acute focal neurological deficits are appreciated.  Psychiatric: Normal affect. Good eye contact. Appropriate in content.    ED Course    Procedures  ED Vital Signs:  Vitals:    02/27/20 2210 02/27/20 2221 02/27/20 2328 02/27/20 2331   BP: 127/75  121/76 124/76   Pulse: 108  101 99   Resp: (!) 22   (!) 23   Temp: (!) 101.9 °F (38.8 °C)      TempSrc: Oral      SpO2: 97%  100% 100%   Weight:  83.2 kg (183 lb 6.4 oz)     Height: 5' 5" (1.651 m)       02/28/20 0127 02/28/20 0300   BP:  (!) 99/53   Pulse:  82   Resp:  19   Temp: 98.8 °F (37.1 °C) 98.8 °F (37.1 °C)   TempSrc: Oral Oral   SpO2:  97%   Weight:     Height:         Abnormal Lab Results:  Labs Reviewed   INFLUENZA A & B BY MOLECULAR - Abnormal; Notable for the following components:       Result Value    Influenza A, Molecular Positive (*)     All other components within normal limits   CBC W/ AUTO DIFFERENTIAL - Abnormal; Notable for the following components:    Mean Corpuscular Volume 78 (*)     Mean Corpuscular Hemoglobin 25.2 (*)     RDW 15.4 (*)     Immature Granulocytes 0.6 (*)     Gran # (ANC) 8.1 (*)     Immature Grans (Abs) 0.06 (*)     Gran% 77.7 (*)     Lymph% 11.2 (*)     All other components within normal limits   COMPREHENSIVE METABOLIC PANEL - Abnormal; Notable for the following components:    CO2 22 (*)     Alkaline Phosphatase 185 (*)     All other components within normal limits   URINALYSIS, REFLEX TO URINE CULTURE - Abnormal; Notable for the following components:    Occult Blood UA 1+ (*)     Leukocytes, UA Trace (*)     All other components within normal limits    Narrative:     Preferred Collection Type->Urine, Clean Catch   MAGNESIUM - Abnormal; Notable for the following components:    Magnesium 1.5 (*)     All other components within normal limits   CULTURE, BLOOD    Narrative:     Aerobic and anaerobic   CULTURE, BLOOD    Narrative:     Aerobic and anaerobic "   THROAT SCREEN, RAPID   CULTURE, STREP A,  THROAT   HIV 1 / 2 ANTIBODY   HEPATITIS C ANTIBODY   LACTIC ACID, PLASMA   PHOSPHORUS   PROTIME-INR   B-TYPE NATRIURETIC PEPTIDE   LIPASE   TROPONIN I   PROCALCITONIN   URINALYSIS MICROSCOPIC    Narrative:     Preferred Collection Type->Urine, Clean Catch        All Lab Results:  Results for orders placed or performed during the hospital encounter of 02/27/20   Blood culture x two cultures. Draw prior to antibiotics.   Result Value Ref Range    Blood Culture, Routine No Growth to date     Blood Culture, Routine No Growth to date     Blood Culture, Routine No Growth to date    Blood culture x two cultures. Draw prior to antibiotics.   Result Value Ref Range    Blood Culture, Routine No Growth to date     Blood Culture, Routine No Growth to date     Blood Culture, Routine No Growth to date    Influenza A & B by Molecular   Result Value Ref Range    Influenza A, Molecular Positive (A) Negative    Influenza B, Molecular Negative Negative    Flu A & B Source Nasal swab    Rapid strep screen   Result Value Ref Range    Rapid Strep A Screen Negative Negative   Strep A culture, throat   Result Value Ref Range    Strep A Culture No  Group A  Streptococcus isolated    HIV 1/2 Ag/Ab (4th Gen)   Result Value Ref Range    HIV 1/2 Ag/Ab Negative Negative   Hepatitis C antibody   Result Value Ref Range    Hepatitis C Ab Negative Negative   CBC auto differential   Result Value Ref Range    WBC 10.35 3.90 - 12.70 K/uL    RBC 4.92 4.00 - 5.40 M/uL    Hemoglobin 12.4 12.0 - 16.0 g/dL    Hematocrit 38.4 37.0 - 48.5 %    Mean Corpuscular Volume 78 (L) 82 - 98 fL    Mean Corpuscular Hemoglobin 25.2 (L) 27.0 - 31.0 pg    Mean Corpuscular Hemoglobin Conc 32.3 32.0 - 36.0 g/dL    RDW 15.4 (H) 11.5 - 14.5 %    Platelets 244 150 - 350 K/uL    MPV 10.0 9.2 - 12.9 fL    Immature Granulocytes 0.6 (H) 0.0 - 0.5 %    Gran # (ANC) 8.1 (H) 1.8 - 7.7 K/uL    Immature Grans (Abs) 0.06 (H) 0.00 - 0.04 K/uL     Lymph # 1.2 1.0 - 4.8 K/uL    Mono # 1.0 0.3 - 1.0 K/uL    Eos # 0.1 0.0 - 0.5 K/uL    Baso # 0.02 0.00 - 0.20 K/uL    nRBC 0 0 /100 WBC    Gran% 77.7 (H) 38.0 - 73.0 %    Lymph% 11.2 (L) 18.0 - 48.0 %    Mono% 9.4 4.0 - 15.0 %    Eosinophil% 0.9 0.0 - 8.0 %    Basophil% 0.2 0.0 - 1.9 %    Differential Method Automated    Comprehensive metabolic panel   Result Value Ref Range    Sodium 136 136 - 145 mmol/L    Potassium 3.8 3.5 - 5.1 mmol/L    Chloride 104 95 - 110 mmol/L    CO2 22 (L) 23 - 29 mmol/L    Glucose 106 70 - 110 mg/dL    BUN, Bld 13 6 - 20 mg/dL    Creatinine 0.9 0.5 - 1.4 mg/dL    Calcium 9.1 8.7 - 10.5 mg/dL    Total Protein 7.1 6.0 - 8.4 g/dL    Albumin 3.5 3.5 - 5.2 g/dL    Total Bilirubin 0.4 0.1 - 1.0 mg/dL    Alkaline Phosphatase 185 (H) 55 - 135 U/L    AST 11 10 - 40 U/L    ALT 11 10 - 44 U/L    Anion Gap 10 8 - 16 mmol/L    eGFR if African American >60 >60 mL/min/1.73 m^2    eGFR if non African American >60 >60 mL/min/1.73 m^2   Lactic acid, plasma #1   Result Value Ref Range    Lactate (Lactic Acid) 0.9 0.5 - 2.2 mmol/L   Urinalysis, Reflex to Urine Culture Urine, Clean Catch   Result Value Ref Range    Specimen UA Urine, Clean Catch     Color, UA Yellow Yellow, Straw, Peace    Appearance, UA Clear Clear    pH, UA 7.0 5.0 - 8.0    Specific Gravity, UA 1.010 1.005 - 1.030    Protein, UA Negative Negative    Glucose, UA Negative Negative    Ketones, UA Negative Negative    Bilirubin (UA) Negative Negative    Occult Blood UA 1+ (A) Negative    Nitrite, UA Negative Negative    Urobilinogen, UA Negative <2.0 EU/dL    Leukocytes, UA Trace (A) Negative   Magnesium   Result Value Ref Range    Magnesium 1.5 (L) 1.6 - 2.6 mg/dL   Phosphorus   Result Value Ref Range    Phosphorus 2.7 2.7 - 4.5 mg/dL   Protime-INR   Result Value Ref Range    Prothrombin Time 10.6 9.0 - 12.5 sec    INR 1.0 0.8 - 1.2   Brain natriuretic peptide   Result Value Ref Range    BNP <10 0 - 99 pg/mL   Lipase   Result Value Ref Range     Lipase 8 4 - 60 U/L   Troponin I   Result Value Ref Range    Troponin I <0.006 0.000 - 0.026 ng/mL   Procalcitonin   Result Value Ref Range    Procalcitonin 0.07 <0.25 ng/mL   Urinalysis Microscopic   Result Value Ref Range    RBC, UA 1 0 - 4 /hpf    WBC, UA 1 0 - 5 /hpf    Bacteria Occasional None-Occ /hpf    Squam Epithel, UA 5 /hpf    Microscopic Comment SEE COMMENT          Imaging Results:  Imaging Results          CT Abdomen Pelvis With Contrast (Final result)  Result time 02/28/20 08:01:09    Final result by oRmeo Wayne MD (02/28/20 08:01:09)                 Impression:      Remote cholecystectomy.  Atherosclerotic calcifications of the abdominal aorta without aneurysmal dilatation.  No acute abdominal abnormality.    All CT scans at this facility use dose modulation, iterative reconstruction and/or weight based dosing when appropriate to reduce radiation dose to as low as reasonably achievable.      Electronically signed by: Romeo Wayne MD  Date:    02/28/2020  Time:    08:01             Narrative:    EXAMINATION:  CT ABDOMEN PELVIS WITH CONTRAST    CLINICAL HISTORY:  Infection, abdomen-pelvis;    TECHNIQUE:  Low dose axial images, sagittal and coronal reformations were obtained from the lung bases to the pubic symphysis following the IV administration of 75 mL of Omnipaque 350 .  Oral contrast was not given.    COMPARISON:  None.    FINDINGS:  ABDOMEN:    - Lung bases: No infiltrates and no nodules.    - Liver: No focal mass.    - Gallbladder: Remote cholecystectomy.    - Bile Ducts: No evidence of intra or extra hepatic biliary ductal dilation.    - Spleen: Negative.    - Kidneys: No mass or hydronephrosis.    - Adrenals: Unremarkable.    - Pancreas: No mass or peripancreatic fat stranding.    - Retroperitoneum:  No significant adenopathy.    - Vascular: Atherosclerotic calcifications of the abdominal aorta.    - Abdominal wall:  Unremarkable.    PELVIS:    Remote  hysterectomy.    BOWEL/MESENTERY:    No evidence of bowel obstruction or inflammation.  The appendix is normal.    BONES:  No acute osseous abnormality and no suspicious lytic or blastic lesion.                               X-Ray Chest AP Portable (Final result)  Result time 02/27/20 23:33:07    Final result by Filemon Zarate III, MD (02/27/20 23:33:07)                 Impression:      No acute abnormality identified in the chest.      Electronically signed by: Filemon Zarate MD  Date:    02/27/2020  Time:    23:33             Narrative:    EXAMINATION:  XR CHEST AP PORTABLE    CLINICAL HISTORY:  Sepsis;    COMPARISON:  February 10, 2020    FINDINGS:  The cardiomediastinal silhouette is within normal limits for AP technique. The lungs appear clear of active disease. No acute infiltrate, pleural effusion or pneumothorax identified.                               12:40 AM: Per STAT radiology, pt's CT Abdomen and Pelvis results: No acute findings in the abdomen or pelvis         The EKG was ordered, reviewed, and independently interpreted by the ED provider.  Interpretation time: 23:22  Rate: 99 BPM  Rhythm: normal sinus rhythm  Interpretation: No acute ST changes. No STEMI.      The Emergency Provider reviewed the vital signs and test results, which are outlined above.    ED Discussion     2:16 AM: Discussed pt's case with Dr. Shepherd (Liver Transplant Team, [facility]) who states if patient is stable and not having any acute liver issues and sepsis ruled out, he recommends normal influenza treatment,   d/c with Tamiflu and close follow up with the Liver Transplant Team.    2:40 AM: Reassessed pt at this time.  Offered patient admission for observation and she declined, stating she would like Rx for Tamiflu to treat at home. Discussed with pt all pertinent ED information and results. Discussed pt dx and plan of tx. Gave pt all f/u and return to the ED instructions. All questions and concerns were addressed at  this time. Pt expresses understanding of information and instructions, and is comfortable with plan to discharge. Pt is stable for discharge.    I discussed with patient and/or family/caretaker that evaluation in the ED does not suggest any emergent or life threatening medical conditions requiring immediate intervention beyond what was provided in the ED, and I believe patient is safe for discharge.  Regardless, an unremarkable evaluation in the ED does not preclude the development or presence of a serious of life threatening condition. As such, patient was instructed to return immediately for any worsening or change in current symptoms.     Regarding INFLUENZA, for prevention, I advised patient to: clean hands with soap and water or an alcohol-based hand rub frequently;  cover mouth and nose with bend of elbow when coughing or sneezing; limit face-to-face contact with others;  maintain good ventilation in the home; follow proper cleaning and disposal procedures for tissues, linens, and eating utensils; and keep surfaces clean (especially bedside tables, surfaces in the bathroom, doorknobs, phones, and childrens toys) by wiping them down with disinfectants. For treatment, recommended that patient: get annual vaccination; get plenty of rest; drink clear fluids like water, broth, sports drinks, or electrolyte beverages; place cool, damp washcloth on forehead, arms, and legs to reduce discomfort associated with a fever; use a humidifier; gargle with warm salt water; and take over-the-counter acetaminophen or ibuprofen for pain relief and fever control. I also discussed the viral origin of influenza and treatment limitations.       ED Medication(s):  Medications   sodium chloride 0.9% bolus 2,496 mL (0 mL/kg × 83.2 kg Intravenous Stopped 2/28/20 0307)   ibuprofen tablet 400 mg (400 mg Oral Given 2/27/20 2327)   iohexol (OMNIPAQUE 350) injection 100 mL (75 mLs Intravenous Given 2/28/20 0037)     Current Discharge  Medication List      START taking these medications    Details   oseltamivir (TAMIFLU) 75 MG capsule Take 1 capsule (75 mg total) by mouth 2 (two) times daily. for 5 days  Qty: 10 capsule, Refills: 0             Follow-up Information     ODILIA Wall. Schedule an appointment as soon as possible for a visit in 1 week.    Specialty:  Family Medicine  Contact information:  [address] [facility] [address] [phone]             [facility]    Specialty:  Emergency Medicine  Why:  As needed, If symptoms worsen  Contact information:  [id number] [place] [id number] [phone]                   Medical Decision Making    Medical Decision Making:   Clinical Tests:   Lab Tests: Ordered and Reviewed  Radiological Study: Reviewed and Ordered  Medical Tests: Reviewed and Ordered           Scribe Attestation:   Scribe #1: I performed the above scribed service and the documentation accurately describes the services I performed. I attest to the accuracy of the note.    Attending:   Physician Attestation Statement for Scribe #1: I, Garret Suggs Jr., MD, personally performed the services described in this documentation, as scribed by Tiffanie Kebede, in my presence, and it is both accurate and complete.          Clinical Impression       ICD-10-CM ICD-9-CM   1. Influenza A J10.1 487.1   2. Fever R50.9 780.60       Disposition:   Disposition: Discharged  Condition: Stable         Garret Suggs Jr., MD  03/01/20 2013

## 2020-02-28 NOTE — DISCHARGE INSTRUCTIONS
Call transplant team in am for follow up.    Rest  Drink plenty of clear fluids   Nasal saline spray to clear nasal drainage and help with nasal congestion  Zyrtec or Claritin to help dry mucus and post nasal drip  Mucinex or Mucinex DM for cough and chest congestion  Tylenol or Ibuprofen for fever, headache and body aches  Warm salt water gargles for throat comfort  Chloraseptic spray or lozenges for throat comfort  RTC with no improvement or worsening    Regarding INFLUENZA, for prevention, I advised patient to: clean hands with soap and water or an alcohol-based hand rub frequently;  cover mouth and nose with bend of elbow when coughing or sneezing; limit face-to-face contact with others;  maintain good ventilation in the home; follow proper cleaning and disposal procedures for tissues, linens, and eating utensils; and keep surfaces clean (especially bedside tables, surfaces in the bathroom, doorknobs, phones, and childrens toys) by wiping them down with disinfectants. For treatment, recommended that patient: get annual vaccination; get plenty of rest; drink clear fluids like water, broth, sports drinks, or electrolyte beverages; place cool, damp washcloth on forehead, arms, and legs to reduce discomfort associated with a fever; use a humidifier; gargle with warm salt water; and take over-the-counter acetaminophen or ibuprofen for pain relief and fever control. I also discussed the viral origin of influenza and treatment limitations.

## 2020-03-01 LAB — BACTERIA THROAT CULT: NORMAL

## 2020-03-02 ENCOUNTER — PATIENT MESSAGE (OUTPATIENT)
Dept: INTERNAL MEDICINE | Facility: CLINIC | Age: 55
End: 2020-03-02

## 2020-03-03 ENCOUNTER — PATIENT OUTREACH (OUTPATIENT)
Dept: ADMINISTRATIVE | Facility: OTHER | Age: 55
End: 2020-03-03

## 2020-03-03 RX ORDER — LEVOCETIRIZINE DIHYDROCHLORIDE 5 MG/1
TABLET, FILM COATED ORAL
Qty: 30 TABLET | Refills: 0 | Status: SHIPPED | OUTPATIENT
Start: 2020-03-03 | End: 2020-04-07

## 2020-03-04 LAB
BACTERIA BLD CULT: NORMAL
BACTERIA BLD CULT: NORMAL

## 2020-03-05 DIAGNOSIS — Z12.31 ENCOUNTER FOR SCREENING MAMMOGRAM FOR MALIGNANT NEOPLASM OF BREAST: Primary | ICD-10-CM

## 2020-03-06 ENCOUNTER — OFFICE VISIT (OUTPATIENT)
Dept: PAIN MEDICINE | Facility: CLINIC | Age: 55
End: 2020-03-06
Payer: MEDICAID

## 2020-03-06 VITALS
SYSTOLIC BLOOD PRESSURE: 133 MMHG | HEART RATE: 78 BPM | WEIGHT: 183 LBS | HEIGHT: 65 IN | DIASTOLIC BLOOD PRESSURE: 80 MMHG | BODY MASS INDEX: 30.49 KG/M2 | RESPIRATION RATE: 18 BRPM

## 2020-03-06 DIAGNOSIS — M51.36 DEGENERATIVE DISC DISEASE, LUMBAR: ICD-10-CM

## 2020-03-06 DIAGNOSIS — M70.61 GREATER TROCHANTERIC BURSITIS OF BOTH HIPS: ICD-10-CM

## 2020-03-06 DIAGNOSIS — M54.16 LUMBAR RADICULOPATHY: ICD-10-CM

## 2020-03-06 DIAGNOSIS — M70.62 GREATER TROCHANTERIC BURSITIS OF BOTH HIPS: ICD-10-CM

## 2020-03-06 DIAGNOSIS — M46.1 SACROILIITIS: Primary | ICD-10-CM

## 2020-03-06 DIAGNOSIS — M53.3 PAIN OF RIGHT SACROILIAC JOINT: ICD-10-CM

## 2020-03-06 PROCEDURE — 99999 PR PBB SHADOW E&M-EST. PATIENT-LVL IV: ICD-10-PCS | Mod: PBBFAC,,, | Performed by: PHYSICIAN ASSISTANT

## 2020-03-06 PROCEDURE — 99999 PR PBB SHADOW E&M-EST. PATIENT-LVL IV: CPT | Mod: PBBFAC,,, | Performed by: PHYSICIAN ASSISTANT

## 2020-03-06 PROCEDURE — 99214 OFFICE O/P EST MOD 30 MIN: CPT | Mod: S$PBB,,, | Performed by: PHYSICIAN ASSISTANT

## 2020-03-06 PROCEDURE — 99214 OFFICE O/P EST MOD 30 MIN: CPT | Mod: PBBFAC | Performed by: PHYSICIAN ASSISTANT

## 2020-03-06 PROCEDURE — 99214 PR OFFICE/OUTPT VISIT, EST, LEVL IV, 30-39 MIN: ICD-10-PCS | Mod: S$PBB,,, | Performed by: PHYSICIAN ASSISTANT

## 2020-03-06 RX ORDER — MYCOPHENOLATE MOFETIL 250 MG/1
CAPSULE ORAL
Qty: 120 CAPSULE | Refills: 2 | Status: SHIPPED | OUTPATIENT
Start: 2020-03-06 | End: 2020-04-06 | Stop reason: ALTCHOICE
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
## 2020-03-07 DIAGNOSIS — R60.0 BILATERAL LEG EDEMA: ICD-10-CM

## 2020-03-07 DIAGNOSIS — Z29.89 PROPHYLACTIC IMMUNOTHERAPY: ICD-10-CM

## 2020-03-12 RX ORDER — FUROSEMIDE 40 MG/1
TABLET ORAL
Qty: 30 TABLET | Refills: 0 | Status: SHIPPED | OUTPATIENT
Start: 2020-03-12 | End: 2020-04-16

## 2020-03-19 ENCOUNTER — PATIENT MESSAGE (OUTPATIENT)
Dept: INTERNAL MEDICINE | Facility: CLINIC | Age: 55
End: 2020-03-19

## 2020-03-20 ENCOUNTER — PATIENT MESSAGE (OUTPATIENT)
Dept: INTERNAL MEDICINE | Facility: CLINIC | Age: 55
End: 2020-03-20

## 2020-03-22 DIAGNOSIS — R11.2 NAUSEA AND VOMITING, INTRACTABILITY OF VOMITING NOT SPECIFIED, UNSPECIFIED VOMITING TYPE: ICD-10-CM

## 2020-03-23 ENCOUNTER — TELEPHONE (OUTPATIENT)
Dept: ADMINISTRATIVE | Facility: HOSPITAL | Age: 55
End: 2020-03-23

## 2020-03-23 ENCOUNTER — PATIENT MESSAGE (OUTPATIENT)
Dept: INTERNAL MEDICINE | Facility: CLINIC | Age: 55
End: 2020-03-23

## 2020-03-23 ENCOUNTER — PATIENT MESSAGE (OUTPATIENT)
Dept: GASTROENTEROLOGY | Facility: CLINIC | Age: 55
End: 2020-03-23

## 2020-03-23 RX ORDER — CYCLOBENZAPRINE HCL 10 MG
TABLET ORAL
Qty: 30 TABLET | Refills: 0 | Status: SHIPPED | OUTPATIENT
Start: 2020-03-23 | End: 2020-04-16

## 2020-03-23 RX ORDER — ONDANSETRON 4 MG/1
TABLET, ORALLY DISINTEGRATING ORAL
Qty: 20 TABLET | Refills: 0 | Status: SHIPPED | OUTPATIENT
Start: 2020-03-23 | End: 2020-11-03

## 2020-03-23 NOTE — PATIENT INSTRUCTIONS
Marcie Scott.    This is a very scary time, I know. A lot of my patients have expressed their fear about what is going to happen to them or their loved ones.    Rest assured, [facility] well[facility] to meet your needs. We are following the recommendations of the CDC and the [place] Department of Health, and we're recommending that our patients do the same.    It is important to keep in mind:    (1) The great majority of people infected with COVID-19 are only mildly to moderately ill and can recover at home and do not need any medical treatment.    (2) Currently, there are NO DRUGS that treat COVID-19. The main treatment for COVID-10 used in the hospital is oxygen. Steroids don't seem to help. Antibiotics don't do anything for COVID-19. Antiviral medicines don't help. (There are some investigational antiviral drugs that are being studied, but they are not approved by the FDA, they are not readily available, and they are generally reserved for critically ill hospitalized patients.)    (3) If you are sick and you go to the doctor's office or the emergency department, then a couple of things can happen.       (A) If you have COVID-19, you can spread the disease by giving it to other people you are exposed to; or       (B) If you don't have COVID-19, you can catch it from the other people you are exposed to.    Because of this, we recommend that people don't go to the emergency department for treatment of COVID-19 unless they are experiencing symptoms that suggest a real emergency (for example, bad chest pain, difficulty breathing, altered mental status, decreased urination, etc.).    We are also recommending that people don't go to doctor's offices unless they need medical treatment that cannot wait or cannot be done over a telemedicine virtual visit. If you are interested in a telemedicine virtual visit, send a [employer] message to my staff and request this option.    If I'm unavailable, you can use [facility] ([email or web address]).    You can get more information about COVID-19 prevention at treatment at [email or web address].    I've summarized recommendations from the CDC (listed below). Please read and follow these recommendations - for your own health and the health of others.    Thanks for letting me care for you, thanks for trusting us with your healthcare needs, and thanks for using [facility].    Sincerely,          Jenny Suggs PA-C     --------------------------------------------------------------------------------      If you are sick with COVID-19 or think you might have it, follow the steps below to help protect other people in your home and community.     - STAY HOME: People who are mildly ill with COVID-19 are able to recover at home. Do not leave, except to get needed medical care. Do not visit public areas.     - CALL AHEAD: If you have a medical appointment, call your doctor's office or emergency department, and tell them you have or may have COVID-19. This will help the office protect themselves and other patients.     - IF YOU ARE SICK: You should wear a facemask when you are around other people and before you enter a healthcare provider's office.     - IF YOU ARE CARING FOR OTHERS: If the person who is sick is not able to wear a facemask (for example, because it causes trouble breathing), then people who live in the home should stay in a different room. When caregivers enter the room of the sick person, they should wear a facemask. Visitors, other than caregivers, are not recommended.     - COVER: Cover your mouth and nose with a tissue when you cough or sneeze.     - DISPOSE: Throw used tissues in a lined trash can.     - WASH HANDS: Immediately wash your hands with soap and water for at least 20 seconds. If soap and water are not available, use an alcohol-based hand  with at least 60% alcohol, covering all surfaces of your hands and rubbing them together  "until they feel dry. This is especially important after blowing your nose, coughing, or sneezing; going to the bathroom; and before eating or preparing food.     - AVOID TOUCHING: Avoid touching your eyes, nose, and mouth with unwashed hands.      - DO NOT SHARE: Do not share dishes, drinking glasses, cups, eating utensils, towels, or bedding with other people in your home. After using these items, wash them thoroughly with soap and water or put in the .     - CLEAN AND DISINFECT: Frequently clean high-touch surfaces in your "sick room" (typically your bedroom) and bathroom. High-touch surfaces include phones, remote controls, counters, tabletops, doorknobs, bathroom fixtures, toilets, keyboards, tablets, and bedside tables. Let someone else clean and disinfect surfaces in common areas, but not your sick room and bathroom.   - - If a caregiver or other person needs to clean and disinfect a sick person's sick room or bathroom, they should do so on an as-needed basis, and they should wear a mask and wait as long as possible after the sick person has used the bathroom.   - - Clean and disinfect areas that may have sputum, blood, stool, or body fluids on them. Clean the area or item with soap and water or another detergent if it is dirty. Then, use a household disinfectant. Be sure to follow the instructions on the label to ensure safe and effective use of the product. Many products recommend keeping the surface wet for several minutes to ensure germs are killed. Many also recommend precautions such as wearing gloves and making sure you have good ventilation during use of the product.     - MONITOR YOUR SYMPTOMS: Seek medical care right away if your illness is becoming more severe (for example, if you have difficulty breathing). Otherwise  Call your doctor before going in: Before going to the doctor's office or emergency room, call ahead and tell them your symptoms. They will tell you what to do.     - WEAR A " FACEMASK: If possible, put on a facemask before you enter the building. If you can't put on a facemask, try to keep a safe distance from other people (at least 6 feet away). This will help protect the people in the office or waiting room.   - If you have a medical emergency and need to call 911, notify the  that you have or think you might have, COVID-19. If possible, put on a facemask before medical help arrives.    PEOPLE WITH COVID-19 WHO HAVE STAYED HOME (HOME ISOLATED) CAN STOP HOME ISOLATION UNDER THE FOLLOWING CONDITIONS:  If you will not have a test to determine if you are still contagious, you can leave home after these three things have happened:   - (1) You have had no fever for at least 72 hours (that is three full days of no fever without the use medicine that reduces fevers)   - - AND   - (2) other symptoms have improved (for example, when your cough or shortness of breath have improved)   - - AND   - (3) at least 7 days have passed since your symptoms first appeared    If you will be tested to determine if you are still contagious, you can leave home after these three things have happened:   - (1) You no longer have a fever (without the use medicine that reduces fevers)    - - AND   - (2) other symptoms have improved (for example, when your cough or shortness of breath have improved)   - - AND   - (3) you received two negative tests in a row, 24 hours apart. Your doctor will follow CDC guidelines.     LEARN MORE:  https://www.cdc.gov/coronavirus/2019-ncov/hcp/guidance-prevent-spread.html#precautions        Guidelines for General Prevention of COVID-19    o Take steps to protect yourself from COVID-19. Perform hand hygiene frequently. Wash your hands often with soap and water for at least 20 seconds of use and alcohol-based hand , covering all surfaces of your hands and rubbing them together until they feel dry.  o Avoid touching your eyes, nose, and mouth with unwashed hands.  o Avoid  close contact with people and stay home if youre sick, except to get medical care.   o Cover coughs and sneezes with a tissue, or use the inside of your elbow. Immediately wash your hands or use hand .     For more information, see CDC link below:    https://www.cdc.gov/coronavirus/2019-ncov/hcp/guidance-prevent-spread.html#precautions

## 2020-03-23 NOTE — TELEPHONE ENCOUNTER
Contacted patient to reschedule appointment on 03/26/2020 with Fatimah Suggs. Left voicemail for patient to call back to reschedule appointment. PDaugherty

## 2020-03-24 ENCOUNTER — TELEPHONE (OUTPATIENT)
Dept: GASTROENTEROLOGY | Facility: CLINIC | Age: 55
End: 2020-03-24

## 2020-03-24 ENCOUNTER — PATIENT MESSAGE (OUTPATIENT)
Dept: INTERNAL MEDICINE | Facility: CLINIC | Age: 55
End: 2020-03-24

## 2020-03-24 NOTE — TELEPHONE ENCOUNTER
----- Message from Cassidy Dailey RN sent at 3/23/2020  4:51 PM CDT -----  Contact: pt  Patient has an appt scheduled for 4/6.  Not sure what she needs?  ----- Message -----  From: Radha Schulz LPN  Sent: 3/23/2020   4:48 PM CDT  To: Casisdy Dailey RN, #        ----- Message -----  From: Eusebio Fuentes  Sent: 3/23/2020  12:53 PM CDT  To: [facility] Staff    Calling in regards to appt that was suppose to be be schedule with Dr.Tyson    Call back: [phone]

## 2020-03-24 NOTE — TELEPHONE ENCOUNTER
Spoke with patient and made her aware that appointment has been converted to a virtual visit with Dr. Joselin Souza on 04/06/2020 at 11:30 am. Patient verbalized understanding.

## 2020-03-24 NOTE — TELEPHONE ENCOUNTER
Spoke with the pt on 3/24/20 in regards to getting her set up with a video visit. The pt verbalized understanding.\\AR

## 2020-03-25 ENCOUNTER — TELEPHONE (OUTPATIENT)
Dept: HEMATOLOGY/ONCOLOGY | Facility: CLINIC | Age: 55
End: 2020-03-25

## 2020-03-25 ENCOUNTER — OFFICE VISIT (OUTPATIENT)
Dept: INTERNAL MEDICINE | Facility: CLINIC | Age: 55
End: 2020-03-25
Payer: MEDICAID

## 2020-03-25 DIAGNOSIS — Z91.89 AT RISK FOR OPPORTUNISTIC INFECTIONS: ICD-10-CM

## 2020-03-25 DIAGNOSIS — R10.13 EPIGASTRIC PAIN: ICD-10-CM

## 2020-03-25 DIAGNOSIS — Z86.19 HISTORY OF HELICOBACTER PYLORI INFECTION: ICD-10-CM

## 2020-03-25 DIAGNOSIS — Z79.60 LONG-TERM USE OF IMMUNOSUPPRESSANT MEDICATION: ICD-10-CM

## 2020-03-25 DIAGNOSIS — R63.4 WEIGHT LOSS: Primary | ICD-10-CM

## 2020-03-25 DIAGNOSIS — R00.2 PALPITATIONS: ICD-10-CM

## 2020-03-25 DIAGNOSIS — R19.7 DIARRHEA, UNSPECIFIED TYPE: ICD-10-CM

## 2020-03-25 DIAGNOSIS — R06.02 SHORTNESS OF BREATH: ICD-10-CM

## 2020-03-25 DIAGNOSIS — R61 NIGHT SWEATS: ICD-10-CM

## 2020-03-25 DIAGNOSIS — Z87.42 HISTORY OF ABNORMAL CERVICAL PAP SMEAR: ICD-10-CM

## 2020-03-25 DIAGNOSIS — D72.9 NEUTROPHILIC LEUKOCYTOSIS: ICD-10-CM

## 2020-03-25 DIAGNOSIS — Z94.4 LIVER REPLACED BY TRANSPLANT: ICD-10-CM

## 2020-03-25 DIAGNOSIS — R11.0 NAUSEA: ICD-10-CM

## 2020-03-25 PROBLEM — K64.1 GRADE II HEMORRHOIDS: Status: RESOLVED | Noted: 2018-01-16 | Resolved: 2020-03-25

## 2020-03-25 PROBLEM — J10.1 INFLUENZA A: Status: RESOLVED | Noted: 2020-02-28 | Resolved: 2020-03-25

## 2020-03-25 PROBLEM — K64.9 HEMORRHOID: Status: RESOLVED | Noted: 2020-02-18 | Resolved: 2020-03-25

## 2020-03-25 PROCEDURE — 99214 PR OFFICE/OUTPT VISIT, EST, LEVL IV, 30-39 MIN: ICD-10-PCS | Mod: 95,,, | Performed by: PHYSICIAN ASSISTANT

## 2020-03-25 PROCEDURE — 99214 OFFICE O/P EST MOD 30 MIN: CPT | Mod: 95,,, | Performed by: PHYSICIAN ASSISTANT

## 2020-03-25 NOTE — TELEPHONE ENCOUNTER
----- Message from Baldo Kim MD sent at 3/25/2020  2:52 PM CDT -----  Tomorrow.  ----- Message -----  From: Irina Moon RN  Sent: 3/25/2020  12:25 PM CDT  To: Baldo Kim MD    Urgent consult placed for night sweats and 30lb weight loss. CT Abd on 2/28 negative.  Pt is able to do virtual visits.  When would you like this appt?  Please advise.

## 2020-03-25 NOTE — PROGRESS NOTES
Subjective:      Patient ID: Marcie Bazan is a 54 y.o. female.    Chief Complaint: Diarrhea; Fatigue; and Shortness of Breath    The patient location is: home  The chief complaint leading to consultation is: fatigue, diarrhea, shortness of breath  Visit type: Virtual visit with synchronous audio and video  Total time spent with patient: 25 minutes  Each patient to whom he or she provides medical services by telemedicine is:  (1) informed of the relationship between the physician and patient and the respective role of any other health care provider with respect to management of the patient; and (2) notified that he or she may decline to receive medical services by telemedicine and may withdraw from such care at any time.      Shortness of Breath   This is a new problem. Episode onset: 4 months. The problem occurs constantly. The problem has been gradually worsening. Associated symptoms include abdominal pain (epigastric) and chest pain. Pertinent negatives include no claudication, coryza, ear pain, fever, headaches, hemoptysis, leg pain, leg swelling, neck pain, orthopnea, PND, rash, rhinorrhea, sore throat, sputum production, swollen glands, syncope, vomiting or wheezing. The symptoms are aggravated by any activity (doing dishes, cooking, walking from living room to kitchen). Associated symptoms comments: Palpitations, weight loss, night sweats. She has tried nothing for the symptoms. There is no history of allergies, aspirin allergies, asthma, bronchiolitis, CAD, chronic lung disease, COPD, DVT, a heart failure, PE, pneumonia or a recent surgery.   Diarrhea    This is a new problem. Episode onset: 4 months. The problem occurs 2 to 4 times per day. The problem has been unchanged. The stool consistency is described as watery. The patient states that diarrhea does not awaken her from sleep. Associated symptoms include abdominal pain (epigastric), coughing, sweats and weight loss. Pertinent negatives include no  arthralgias, bloating, chills, fever, headaches, increased  flatus, myalgias, URI or vomiting. Associated symptoms comments: nausea. Nothing aggravates the symptoms. She has tried nothing for the symptoms. Her past medical history is significant for a recent abdominal surgery (hemorrhoidal surgery 2019). There is no history of bowel resection, inflammatory bowel disease, irritable bowel syndrome, malabsorption or short gut syndrome.   Fatigue   This is a new problem. Episode onset: 4 months. The problem occurs constantly. The problem has been unchanged. Associated symptoms include abdominal pain (epigastric), anorexia, a change in bowel habit, chest pain, coughing, diaphoresis, fatigue, nausea and weakness. Pertinent negatives include no arthralgias, chills, congestion, fever, headaches, joint swelling, myalgias, neck pain, numbness, rash, sore throat, swollen glands, urinary symptoms, vertigo, visual change or vomiting. The symptoms are aggravated by standing, walking and exertion. She has tried nothing for the symptoms.   Abdominal Pain   This is a new problem. Episode onset: 4 months. The problem occurs intermittently. The problem has been unchanged. The pain is located in the epigastric region and RUQ. The pain is moderate. The quality of the pain is aching. The abdominal pain does not radiate. Associated symptoms include anorexia, diarrhea, nausea and weight loss. Pertinent negatives include no arthralgias, belching, constipation, dysuria, fever, flatus, frequency, headaches, hematochezia, hematuria, melena, myalgias or vomiting. The pain is aggravated by eating. The pain is relieved by nothing. Her past medical history is significant for abdominal surgery, gallstones, GERD and PUD. There is no history of colon cancer, Crohn's disease, irritable bowel syndrome, pancreatitis or ulcerative colitis. Patient's medical history does not include kidney stones and UTI.   Patient reports a 30 lb weight loss in 4 months.    Diagnosed with the flu A 2/27. Has had a persistent cough since her diagnosis.   Scheduled to see GI for a video visit 4/6/2020.       Patient Active Problem List   Diagnosis    Allergic rhinitis    Anemia of chronic disease    Erosive esophagitis    Essential hypertension    History of abnormal cervical Pap smear    History of Helicobacter pylori infection    Lipoma    Chronic right-sided low back pain with sciatica    Multinodular thyroid    Hypomagnesemia    Liver replaced by transplant    Adrenal cortical steroids causing adverse effect in therapeutic use    At risk for opportunistic infections    Long-term use of immunosuppressant medication    Accelerated liver transplant rejection    Osteoarthritis of spine with radiculopathy, lumbar region    Vitamin D deficiency    Mixed hyperlipidemia    Adhesive capsulitis of both shoulders    SI (sacroiliac) pain    It band syndrome, left    Right leg pain    Piriformis syndrome of both sides    Greater trochanteric bursitis of both hips    Spondylosis of lumbar region without myelopathy or radiculopathy    Weakness of both hips    Obesity (BMI 30.0-34.9)    Difficulty walking    Muscle weakness (generalized)    Chronic bilateral low back pain with sciatica    Sacroiliac joint dysfunction    Dyspnea on exertion    Unintentional weight loss of 10% body weight within 6 months         Review of Systems   Constitutional: Positive for activity change, appetite change, diaphoresis, fatigue, unexpected weight change (loss) and weight loss. Negative for chills and fever.   HENT: Positive for ear discharge. Negative for congestion, ear pain, rhinorrhea and sore throat.    Respiratory: Positive for cough, chest tightness and shortness of breath. Negative for apnea, hemoptysis, sputum production, choking, wheezing and stridor.    Cardiovascular: Positive for chest pain and palpitations. Negative for orthopnea, claudication, leg swelling, syncope  and PND.   Gastrointestinal: Positive for abdominal pain (epigastric), anorexia, change in bowel habit, diarrhea and nausea. Negative for abdominal distention, anal bleeding, bloating, blood in stool, constipation, flatus, hematochezia, melena, rectal pain and vomiting.   Genitourinary: Negative for decreased urine volume, difficulty urinating, dyspareunia, dysuria, enuresis, flank pain, frequency, genital sores, hematuria, menstrual problem, pelvic pain, urgency, vaginal bleeding and vaginal pain.   Musculoskeletal: Negative for arthralgias, joint swelling, myalgias and neck pain.   Skin: Negative for rash.   Allergic/Immunologic: Positive for immunocompromised state.   Neurological: Positive for weakness. Negative for vertigo, numbness and headaches.     Objective:   LMP 01/01/1985 (Approximate) Comment: 1985    Physical Exam  Not able to complete due to video visit  Assessment:     1. Weight loss    2. Night sweats    3. Shortness of breath    4. Palpitations    5. Epigastric pain    6. Diarrhea, unspecified type    7. Nausea    8. Neutrophilic leukocytosis    9. At risk for opportunistic infections    10. Long-term use of immunosuppressant medication    11. Liver replaced by transplant    12. History of abnormal cervical Pap smear    13. History of Helicobacter pylori infection      Plan:   Weight loss  -     Ambulatory referral/consult to Hematology / Oncology; Future; Expected date: 04/01/2020  -     CBC auto differential; Future; Expected date: 03/25/2020  -     Comprehensive metabolic panel; Future    Night sweats  -     Ambulatory referral/consult to Hematology / Oncology; Future; Expected date: 04/01/2020  -     CBC auto differential; Future; Expected date: 03/25/2020  -     Comprehensive metabolic panel; Future    Shortness of breath  -     Ambulatory referral/consult to Cardiology  -     CBC auto differential; Future; Expected date: 03/25/2020  -     EKG 12-lead; Future  -     Brain Natriuretic Peptide;  Future; Expected date: 03/25/2020  -     X-Ray Chest PA And Lateral; Future; Expected date: 03/25/2020    Palpitations  -     Ambulatory referral/consult to Cardiology  -     CBC auto differential; Future; Expected date: 03/25/2020    Epigastric pain  -     CBC auto differential; Future; Expected date: 03/25/2020  -     Comprehensive metabolic panel; Future    Diarrhea, unspecified type  -     CBC auto differential; Future; Expected date: 03/25/2020  -     Comprehensive metabolic panel; Future  -     TSH; Future  -     Procalcitonin; Future; Expected date: 03/25/2020  -     H. pylori antigen, stool; Future; Expected date: 03/25/2020  -     Stool culture; Future; Expected date: 03/25/2020  -     Stool Exam-Ova,Cysts,Parasites; Future; Expected date: 03/25/2020  -     Giardia / Cryptosporidum, EIA; Future; Expected date: 03/25/2020  -     Occult blood x 1, stool; Future; Expected date: 03/25/2020  -     Occult blood x 1, stool; Future; Expected date: 03/25/2020  -     Occult blood x 1, stool; Future; Expected date: 03/25/2020    Nausea  -     Comprehensive metabolic panel; Future    Neutrophilic leukocytosis  -     Ambulatory referral/consult to Hematology / Oncology; Future; Expected date: 04/01/2020  -     CBC auto differential; Future; Expected date: 03/25/2020  -     Procalcitonin; Future; Expected date: 03/25/2020    At risk for opportunistic infections  -     Ambulatory referral/consult to Hematology / Oncology; Future; Expected date: 04/01/2020    Long-term use of immunosuppressant medication  -     Ambulatory referral/consult to Hematology / Oncology; Future; Expected date: 04/01/2020    Liver replaced by transplant    History of abnormal cervical Pap smear    History of Helicobacter pylori infection    -needs further workup and eval by hem/onc, cardiology, and GI. Referrals placed.     Follow up if symptoms worsen or fail to improve.

## 2020-03-25 NOTE — Clinical Note
Patient is scheduled to see you 4/6/2020 for a video visit. I saw her today for a video visit. Complains of 30lb weight loss since November, nausea, recent changes in BM (watery diarrhea for every BM), night sweats, shortness of breath, fatigue, chronic nausea and epigastric pain. Let me know if there is anything else you would like me to order.

## 2020-03-25 NOTE — NURSING
Contacted pt regarding referral received for urgent appt.  Notified pt that I can put her to see provider tomorrow morning at 0820 with Dr. Kim.  Encouraged pt to schedule video visit but she wanted to see provider in person for physical assessment.  Pt notified of of visitor policy and verbalized understanding.  Pt also aware of appt location.  Notified pt that I can reschedule her labs to be done at our facility after appt.  Also notified pt that I could schedule her xray at [facility] one after appt.  Notified pt that I have reached out to cardiology staff to have ekg moved as well but I am unsure if this will be done since I have not heard back.  Explained to pt to monitor her portal for any changes and that I would call her if I hear something.  Pt verbalized understanding and voiced her appreciation.

## 2020-03-26 ENCOUNTER — PATIENT MESSAGE (OUTPATIENT)
Dept: INTERNAL MEDICINE | Facility: CLINIC | Age: 55
End: 2020-03-26

## 2020-03-26 ENCOUNTER — CLINICAL SUPPORT (OUTPATIENT)
Dept: PULMONOLOGY | Facility: CLINIC | Age: 55
End: 2020-03-26
Payer: MEDICAID

## 2020-03-26 ENCOUNTER — OFFICE VISIT (OUTPATIENT)
Dept: CARDIOLOGY | Facility: CLINIC | Age: 55
End: 2020-03-26
Payer: MEDICAID

## 2020-03-26 ENCOUNTER — HOSPITAL ENCOUNTER (OUTPATIENT)
Dept: CARDIOLOGY | Facility: HOSPITAL | Age: 55
Discharge: HOME | End: 2020-03-26
Attending: PHYSICIAN ASSISTANT
Payer: MEDICAID

## 2020-03-26 ENCOUNTER — OFFICE VISIT (OUTPATIENT)
Dept: HEMATOLOGY/ONCOLOGY | Facility: CLINIC | Age: 55
End: 2020-03-26
Payer: MEDICAID

## 2020-03-26 ENCOUNTER — HOSPITAL ENCOUNTER (OUTPATIENT)
Dept: RADIOLOGY | Facility: HOSPITAL | Age: 55
Discharge: HOME | End: 2020-03-26
Attending: PHYSICIAN ASSISTANT
Payer: MEDICAID

## 2020-03-26 ENCOUNTER — HOSPITAL ENCOUNTER (EMERGENCY)
Facility: HOSPITAL | Age: 55
Discharge: HOME | End: 2020-03-26
Attending: EMERGENCY MEDICINE
Payer: MEDICAID

## 2020-03-26 VITALS
HEART RATE: 78 BPM | RESPIRATION RATE: 19 BRPM | OXYGEN SATURATION: 100 % | WEIGHT: 188.38 LBS | DIASTOLIC BLOOD PRESSURE: 81 MMHG | BODY MASS INDEX: 31.35 KG/M2 | SYSTOLIC BLOOD PRESSURE: 134 MMHG | TEMPERATURE: 98 F

## 2020-03-26 VITALS
BODY MASS INDEX: 29.97 KG/M2 | TEMPERATURE: 97 F | WEIGHT: 179.88 LBS | HEIGHT: 65 IN | SYSTOLIC BLOOD PRESSURE: 124 MMHG | HEART RATE: 89 BPM | OXYGEN SATURATION: 95 % | DIASTOLIC BLOOD PRESSURE: 83 MMHG

## 2020-03-26 VITALS
HEART RATE: 85 BPM | SYSTOLIC BLOOD PRESSURE: 116 MMHG | WEIGHT: 179.44 LBS | HEIGHT: 65 IN | OXYGEN SATURATION: 95 % | BODY MASS INDEX: 29.9 KG/M2 | DIASTOLIC BLOOD PRESSURE: 80 MMHG

## 2020-03-26 VITALS — BODY MASS INDEX: 29.82 KG/M2 | HEIGHT: 65 IN | WEIGHT: 179 LBS

## 2020-03-26 DIAGNOSIS — Z91.89 AT RISK FOR OPPORTUNISTIC INFECTIONS: ICD-10-CM

## 2020-03-26 DIAGNOSIS — Z79.60 LONG-TERM USE OF IMMUNOSUPPRESSANT MEDICATION: ICD-10-CM

## 2020-03-26 DIAGNOSIS — E55.9 VITAMIN D DEFICIENCY: ICD-10-CM

## 2020-03-26 DIAGNOSIS — R53.1 EPISODIC WEAKNESS: Primary | ICD-10-CM

## 2020-03-26 DIAGNOSIS — R63.4 WEIGHT LOSS: ICD-10-CM

## 2020-03-26 DIAGNOSIS — I10 ESSENTIAL HYPERTENSION: Primary | Chronic | ICD-10-CM

## 2020-03-26 DIAGNOSIS — G89.29 CHRONIC RIGHT-SIDED LOW BACK PAIN WITH RIGHT-SIDED SCIATICA: ICD-10-CM

## 2020-03-26 DIAGNOSIS — Z86.19 HISTORY OF HELICOBACTER PYLORI INFECTION: ICD-10-CM

## 2020-03-26 DIAGNOSIS — M54.41 CHRONIC RIGHT-SIDED LOW BACK PAIN WITH RIGHT-SIDED SCIATICA: ICD-10-CM

## 2020-03-26 DIAGNOSIS — E04.2 MULTINODULAR THYROID: ICD-10-CM

## 2020-03-26 DIAGNOSIS — R06.09 DYSPNEA ON EXERTION: ICD-10-CM

## 2020-03-26 DIAGNOSIS — E83.42 HYPOMAGNESEMIA: ICD-10-CM

## 2020-03-26 DIAGNOSIS — E66.9 OBESITY, UNSPECIFIED CLASSIFICATION, UNSPECIFIED OBESITY TYPE, UNSPECIFIED WHETHER SERIOUS COMORBIDITY PRESENT: ICD-10-CM

## 2020-03-26 DIAGNOSIS — R06.09 DOE (DYSPNEA ON EXERTION): ICD-10-CM

## 2020-03-26 DIAGNOSIS — R06.02 SHORTNESS OF BREATH: ICD-10-CM

## 2020-03-26 DIAGNOSIS — E78.2 MIXED HYPERLIPIDEMIA: ICD-10-CM

## 2020-03-26 DIAGNOSIS — R63.4 UNINTENTIONAL WEIGHT LOSS OF 10% BODY WEIGHT WITHIN 6 MONTHS: ICD-10-CM

## 2020-03-26 DIAGNOSIS — D63.8 CHRONIC DISEASE ANEMIA: ICD-10-CM

## 2020-03-26 DIAGNOSIS — I10 ESSENTIAL HYPERTENSION: ICD-10-CM

## 2020-03-26 DIAGNOSIS — Z94.4 LIVER REPLACED BY TRANSPLANT: ICD-10-CM

## 2020-03-26 DIAGNOSIS — R06.02 SOB (SHORTNESS OF BREATH): ICD-10-CM

## 2020-03-26 DIAGNOSIS — R06.09 DOE (DYSPNEA ON EXERTION): Primary | ICD-10-CM

## 2020-03-26 DIAGNOSIS — T86.41: ICD-10-CM

## 2020-03-26 DIAGNOSIS — E66.9 OBESITY (BMI 30.0-34.9): ICD-10-CM

## 2020-03-26 DIAGNOSIS — I10 ESSENTIAL HYPERTENSION: Chronic | ICD-10-CM

## 2020-03-26 DIAGNOSIS — D63.8 ANEMIA OF CHRONIC DISEASE: ICD-10-CM

## 2020-03-26 DIAGNOSIS — Z87.42 HISTORY OF ABNORMAL CERVICAL PAP SMEAR: ICD-10-CM

## 2020-03-26 DIAGNOSIS — R07.9 CHEST PAIN: ICD-10-CM

## 2020-03-26 DIAGNOSIS — R61 NIGHT SWEATS: ICD-10-CM

## 2020-03-26 DIAGNOSIS — D72.9 NEUTROPHILIC LEUKOCYTOSIS: ICD-10-CM

## 2020-03-26 LAB
ALBUMIN SERPL BCP-MCNC: 3.7 G/DL (ref 3.5–5.2)
ALP SERPL-CCNC: 171 U/L (ref 55–135)
ALT SERPL W/O P-5'-P-CCNC: 8 U/L (ref 10–44)
ANION GAP SERPL CALC-SCNC: 11 MMOL/L (ref 8–16)
AST SERPL-CCNC: 12 U/L (ref 10–40)
BASOPHILS # BLD AUTO: 0.02 K/UL (ref 0–0.2)
BASOPHILS NFR BLD: 0.2 % (ref 0–1.9)
BILIRUB SERPL-MCNC: 0.4 MG/DL (ref 0.1–1)
BUN SERPL-MCNC: 4 MG/DL (ref 6–20)
CALCIUM SERPL-MCNC: 9.6 MG/DL (ref 8.7–10.5)
CHLORIDE SERPL-SCNC: 105 MMOL/L (ref 95–110)
CO2 SERPL-SCNC: 23 MMOL/L (ref 23–29)
CREAT SERPL-MCNC: 0.8 MG/DL (ref 0.5–1.4)
DIFFERENTIAL METHOD: ABNORMAL
EOSINOPHIL # BLD AUTO: 0.1 K/UL (ref 0–0.5)
EOSINOPHIL NFR BLD: 0.4 % (ref 0–8)
ERYTHROCYTE [DISTWIDTH] IN BLOOD BY AUTOMATED COUNT: 14.7 % (ref 11.5–14.5)
EST. GFR  (AFRICAN AMERICAN): >60 ML/MIN/1.73 M^2
EST. GFR  (NON AFRICAN AMERICAN): >60 ML/MIN/1.73 M^2
GLUCOSE SERPL-MCNC: 95 MG/DL (ref 70–110)
HCT VFR BLD AUTO: 38.8 % (ref 37–48.5)
HGB BLD-MCNC: 11.8 G/DL (ref 12–16)
IMM GRANULOCYTES # BLD AUTO: 0.04 K/UL (ref 0–0.04)
IMM GRANULOCYTES NFR BLD AUTO: 0.4 % (ref 0–0.5)
LYMPHOCYTES # BLD AUTO: 2.3 K/UL (ref 1–4.8)
LYMPHOCYTES NFR BLD: 20 % (ref 18–48)
MCH RBC QN AUTO: 24.6 PG (ref 27–31)
MCHC RBC AUTO-ENTMCNC: 30.4 G/DL (ref 32–36)
MCV RBC AUTO: 81 FL (ref 82–98)
MONOCYTES # BLD AUTO: 0.7 K/UL (ref 0.3–1)
MONOCYTES NFR BLD: 5.8 % (ref 4–15)
NEUTROPHILS # BLD AUTO: 8.3 K/UL (ref 1.8–7.7)
NEUTROPHILS NFR BLD: 73.2 % (ref 38–73)
NRBC BLD-RTO: 0 /100 WBC
PLATELET # BLD AUTO: 225 K/UL (ref 150–350)
PMV BLD AUTO: 10.6 FL (ref 9.2–12.9)
POTASSIUM SERPL-SCNC: 4 MMOL/L (ref 3.5–5.1)
PROT SERPL-MCNC: 7.3 G/DL (ref 6–8.4)
RBC # BLD AUTO: 4.79 M/UL (ref 4–5.4)
SODIUM SERPL-SCNC: 139 MMOL/L (ref 136–145)
T4 FREE SERPL-MCNC: 0.97 NG/DL (ref 0.71–1.51)
TROPONIN I SERPL DL<=0.01 NG/ML-MCNC: <0.006 NG/ML (ref 0–0.03)
TSH SERPL DL<=0.005 MIU/L-ACNC: 0.13 UIU/ML (ref 0.4–4)
WBC # BLD AUTO: 11.37 K/UL (ref 3.9–12.7)

## 2020-03-26 PROCEDURE — 99215 PR OFFICE/OUTPT VISIT, EST, LEVL V, 40-54 MIN: ICD-10-PCS | Mod: S$PBB,,, | Performed by: INTERNAL MEDICINE

## 2020-03-26 PROCEDURE — 80053 COMPREHEN METABOLIC PANEL: CPT | Mod: 91

## 2020-03-26 PROCEDURE — 99999 PR PBB SHADOW E&M-EST. PATIENT-LVL V: CPT | Mod: PBBFAC,,, | Performed by: INTERNAL MEDICINE

## 2020-03-26 PROCEDURE — 85025 COMPLETE CBC W/AUTO DIFF WBC: CPT

## 2020-03-26 PROCEDURE — 99999 PR PBB SHADOW E&M-EST. PATIENT-LVL V: ICD-10-PCS | Mod: PBBFAC,,, | Performed by: INTERNAL MEDICINE

## 2020-03-26 PROCEDURE — 93010 EKG 12-LEAD: ICD-10-PCS | Mod: ,,, | Performed by: INTERNAL MEDICINE

## 2020-03-26 PROCEDURE — 93010 ELECTROCARDIOGRAM REPORT: CPT | Mod: 59,77,, | Performed by: INTERNAL MEDICINE

## 2020-03-26 PROCEDURE — 99213 OFFICE O/P EST LOW 20 MIN: CPT | Mod: PBBFAC,25 | Performed by: INTERNAL MEDICINE

## 2020-03-26 PROCEDURE — 99999 PR PBB SHADOW E&M-EST. PATIENT-LVL III: CPT | Mod: PBBFAC,,, | Performed by: INTERNAL MEDICINE

## 2020-03-26 PROCEDURE — 84439 ASSAY OF FREE THYROXINE: CPT

## 2020-03-26 PROCEDURE — 99999 PR PBB SHADOW E&M-EST. PATIENT-LVL III: ICD-10-PCS | Mod: PBBFAC,,, | Performed by: INTERNAL MEDICINE

## 2020-03-26 PROCEDURE — 99215 OFFICE O/P EST HI 40 MIN: CPT | Mod: S$PBB,,, | Performed by: INTERNAL MEDICINE

## 2020-03-26 PROCEDURE — 93005 ELECTROCARDIOGRAM TRACING: CPT

## 2020-03-26 PROCEDURE — 71046 X-RAY EXAM CHEST 2 VIEWS: CPT | Mod: 26,,, | Performed by: RADIOLOGY

## 2020-03-26 PROCEDURE — 94618 PULMONARY STRESS TESTING: ICD-10-PCS | Mod: 26,S$PBB,, | Performed by: INTERNAL MEDICINE

## 2020-03-26 PROCEDURE — 99205 OFFICE O/P NEW HI 60 MIN: CPT | Mod: S$PBB,,, | Performed by: INTERNAL MEDICINE

## 2020-03-26 PROCEDURE — 94618 PULMONARY STRESS TESTING: CPT | Mod: PBBFAC

## 2020-03-26 PROCEDURE — 71046 XR CHEST PA AND LATERAL: ICD-10-PCS | Mod: 26,,, | Performed by: RADIOLOGY

## 2020-03-26 PROCEDURE — 99999 PR PBB SHADOW E&M-EST. PATIENT-LVL I: CPT | Mod: PBBFAC,,,

## 2020-03-26 PROCEDURE — 99211 OFF/OP EST MAY X REQ PHY/QHP: CPT | Mod: PBBFAC,25,27

## 2020-03-26 PROCEDURE — 93010 EKG 12-LEAD: ICD-10-PCS | Mod: 59,77,, | Performed by: INTERNAL MEDICINE

## 2020-03-26 PROCEDURE — 94618 PULMONARY STRESS TESTING: CPT | Mod: 26,S$PBB,, | Performed by: INTERNAL MEDICINE

## 2020-03-26 PROCEDURE — 99205 PR OFFICE/OUTPT VISIT, NEW, LEVL V, 60-74 MIN: ICD-10-PCS | Mod: S$PBB,,, | Performed by: INTERNAL MEDICINE

## 2020-03-26 PROCEDURE — 84484 ASSAY OF TROPONIN QUANT: CPT

## 2020-03-26 PROCEDURE — 99999 PR PBB SHADOW E&M-EST. PATIENT-LVL I: ICD-10-PCS | Mod: PBBFAC,,,

## 2020-03-26 PROCEDURE — 93010 ELECTROCARDIOGRAM REPORT: CPT | Mod: ,,, | Performed by: INTERNAL MEDICINE

## 2020-03-26 PROCEDURE — 71046 X-RAY EXAM CHEST 2 VIEWS: CPT | Mod: TC

## 2020-03-26 PROCEDURE — 99215 OFFICE O/P EST HI 40 MIN: CPT | Mod: PBBFAC,25,27 | Performed by: INTERNAL MEDICINE

## 2020-03-26 PROCEDURE — 84443 ASSAY THYROID STIM HORMONE: CPT | Mod: 91

## 2020-03-26 PROCEDURE — 99285 EMERGENCY DEPT VISIT HI MDM: CPT | Mod: 25,27

## 2020-03-26 PROCEDURE — 93005 ELECTROCARDIOGRAM TRACING: CPT | Mod: 59

## 2020-03-26 NOTE — LETTER
March 26, 2020      Jenny Suggs PA-C  [address]  [place] [address]            [facility]  [address] [facility] [address]  Phone: [phone]  Fax: [phone]          Patient: Marcei Bazan   MR Number: [id number]   YOB: 1965   Date of Visit: 3/26/2020       Dear Jenny Suggs:    Thank you for referring Marcie Bazan to me for evaluation. Attached you will find relevant portions of my assessment and plan of care.    If you have questions, please do not hesitate to call me. I look forward to following Marcie Bazan along with you.    Sincerely,    Baldo Kim MD    Enclosure  CC:  No Recipients    If you would like to receive this communication electronically, please contact [email or web address] or [phone] to request more information on [employer] access.    For providers and/or their staff who would like to refer a patient to [facility], please contact us through our one-stop-shop provider referral line, [facility] , at [phone].    If you feel you have received this communication in error or would no longer like to receive these types of communications, please e-mail [email or web address]

## 2020-03-26 NOTE — PROGRESS NOTES
6MWT completed. Patient had near syncope episode following recovery period and when attempting to leave appointment. Patient was being escorted from appointment by RT and requested to go to the restroom. Patient lost balance in restroom and RT provided support while simultaneously calling for assistance. Chair immediately brought for patient to seated in restroom. Patient alert, but c/o dizziness and nausea. Decision made to contact referring provider, Dr. Munson. Patient was seen prior to 6MWT by Dr. Munson. Dr. Munson advised to have patient report to ED. Patient alert and vital signs stable. Patient hesitant to report to ED and reports some improvement in symptoms. Alerted supervising pulmonary physician Dr. Nelson. Dr Nelson assessed patient and vital signs were evaluated. /87, SpO2 100%, HR91. Dr. Nelson recommended patient be transported for ED evaluation.  Dr. Nelson emphasized the adverse risks associated with patient driving herself to emergency room. EMS contacted per Dr. Nelson's recommendation.  Patient stable and transported to ED via EMS,  per MD instruction.

## 2020-03-26 NOTE — PROCEDURES
"[facility]  Six Minute Walk     SUMMARY     Ordering Provider: MD Arpit       Performing nurse/tech/RT: Nomi Raoms RRT   Diagnosis: Shortness of Breath  Height: 5' 5" (165.1 cm)  Weight: 81.2 kg (179 lb)  BMI (Calculated): 29.8   Patient Race:             Phase Oxygen Assessment Supplemental O2 Heart   Rate Blood Pressure Imelda Dyspnea Scale Rating   Resting 100 % Room Air 82 bpm 120/70 4   Exercise        Minute        1 95 % Room Air 91 bpm     2 91 % Room Air 92 bpm     3 93 % Room Air 89 bpm     4 100 % Room Air 93 bpm     5 100 % Room Air 94 bpm     6  99 % Room Air 95 bpm 117/80 7-8   Recovery        Minute        1 100 % Room Air 89 bpm     2 100 % Room Air 89 bpm     3 100 % Room Air 87 bpm     4 100 % Room Air 88 bpm 117/89 4     Six Minute Walk Summary  6MWT Status: completed without stopping  Patient Reported: Chest pain, Dizziness, Lightheadedness, Dyspnea, Leg pain     Interpretation:  Did the patient stop or pause?: No        Total Time Walked (Calculated): 360 seconds  Final Partial Lap Distance (feet): 50 feet  Total Distance Meters (Calculated): 228.6 meters  Predicted Distance Meters (Calculated): 505.75 meters  Percentage of Predicted (Calculated): 45.2  Peak VO2 (Calculated): 10.84  Mets: 3.1  Has The Patient Had a Previous Six Minute Walk Test?: No     Previous 6MWT Results  Has The Patient Had a Previous Six Minute Walk Test?: No         PHYSICIAN INTERPRETATION:      Six minute walk distance is 228.6 / 505.75 meters  (45.2 % predicted) with very heavy dyspnea.   Peak VO2 during walking was 18.84  Ml/min/kg [ 3.1 METS].   During exercise, there was no significant desaturation while breathing room air.  Lowest oxygen saturation during walking was 91 %.   Both blood pressure and heart rate remained stable with walking.  Normotension was present prior to exercise.  This may represent a normal cardiovascular response to exercise.  The patient reported " non-pulmonary symptoms during exercise - chest pain, lightheadedness, dizziness, leg pain.  There was Significant exercise impairment during walking.  Significant exercise impairment is likely due to subjective symptoms.  The patient did complete the study, walking 360 seconds of the 360 second test.  The patient did not require oxygen supplementation during walking.  The patient may benefit from a comprehensive medical evaluation.  No previous study performed.  Based upon age and body mass index, exercise capacity is less than predicted.

## 2020-03-26 NOTE — ASSESSMENT & PLAN NOTE
Unknown etiology.  Reviewed CT scan of abdomen and pelvis that showed no abnormal lymphadenopathy.  I did explain to patient that given the presence of B symptoms including night sweats and unintentional weight loss that there are few fence in my differentials:  1.  Lymphoma:  This is possible given her history of liver transplantation and currently on immunosuppressants.  I did explain to her that more than 10% of patients who are immunosuppressed following organ transplantation have risk higher than the general population of developing secondary malignancy including lymphoma.  Although CT scan of abdomen and pelvis was negative for lymphadenopathy, will proceed with PET-CT scan for further evaluation.  I will also order LDH and ESR.  2.  Neuroendocrine tumor:  Given her chronic diarrhea and palpitations, it is possible that she has neuroendocrine tumor secreting hormones that exacerbate her sympathetic nervous system.  To that effect will be checking chromogranin A and gastrin levels.  3.  Thyroid dysfunction which could also cause diarrhea and palpitation.  Will check thyroid function.  Patient is to return back in about 2-3 weeks to discuss results and management approach.

## 2020-03-26 NOTE — ED NOTES
Pt. Resting in bed. No acute distress, RR equal and non labored, VSS. Bed in low, locked, and call light in reach. Side rails up X 2. Will continue to monitor.  Awaiting provider eval.

## 2020-03-26 NOTE — PROGRESS NOTES
Subjective:   Date of Visit: 3/26/20   ?   ?    REFERRING PROVIDER: Jenny Suggs PA-C  [address] [facility]  [place] [address]   ?   CHIEF COMPLAINT:  Unintentional weight loss and night sweats. ???????       HPI:  54-year-old female with history of arthritis, alcohol abuse with end-stage liver disease, hypertension, hyperlipidemia, questionable liver cancer status post liver transplant in 2017, presents to us as a referral due to recent unintentional weight loss with associated abdominal pain and diarrhea as well as shortness of breath.    CT scan of the abdomen and pelvis showed atherosclerotic calcification of the abdominal aorta without aneurysmal dilatation, no acute abdominal abnormality.    Today she presents to the clinic with complaints of unintentional weight loss of 30 lb since November of 2019, abdominal pain which she described as ache, not associated with food, diffuse and nonradiating.  She rates pain at 6/10 in severity.  She also complains of diarrhea ranging anywhere between 5-7 times per day of what she described as nonbloody watery diarrhea.  She states that diarrhea has been chronic.  Patient also complains of occasional palpitation but denies chest pain.  She denies fever, nausea or vomiting, chills, dysuria.  She also denies hemoptysis, hematemesis, hematochezia, melena or hematuria.    Patient has a history of alcohol abuse.  But denies any recent alcohol use.  She also denies use of illicit drugs.  No family history for cancer.    Review of Systems   Constitutional: Positive for fatigue and unexpected weight change. Negative for activity change, appetite change, chills and fever.   HENT: Negative for hearing loss, mouth sores, nosebleeds, sore throat, tinnitus, trouble swallowing and voice change.    Eyes: Negative for visual disturbance.   Respiratory: Positive for shortness of breath. Negative for cough and chest tightness.    Cardiovascular: Positive for palpitations.  Negative for chest pain and leg swelling.   Gastrointestinal: Positive for diarrhea. Negative for abdominal pain, anal bleeding, blood in stool, constipation, nausea and vomiting.   Genitourinary: Negative for dysuria, frequency, hematuria, pelvic pain, vaginal bleeding and vaginal pain.   Musculoskeletal: Negative for arthralgias, back pain, joint swelling and neck pain.   Skin: Negative for color change, pallor, rash and wound.   Allergic/Immunologic: Negative for immunocompromised state.   Neurological: Positive for weakness. Negative for dizziness, tremors, syncope, speech difficulty, light-headedness and headaches.        Night sweats   Hematological: Negative for adenopathy. Does not bruise/bleed easily.   Psychiatric/Behavioral: Negative for agitation, confusion, decreased concentration, hallucinations and sleep disturbance. The patient is not nervous/anxious.        ?   PAST MEDICAL HISTORY:   Past Medical History:   Diagnosis Date    Alcohol abuse     Alcoholic hepatitis     Anemia of chronic disease     Arthritis     generialized    Cancer 12/26/2017    liver    Coagulopathy     Dyspnea on exertion 3/26/2020    Encounter for blood transfusion     End stage liver disease     History of alcohol abuse     Hyperlipidemia     Hypertension     Hyponatremia     Liver cirrhosis     Liver transplant candidate 12/26/2017    Obesity (BMI 30-39.9) 1/5/2016    ?     PAST SURGICAL HISTORY:   Past Surgical History:   Procedure Laterality Date    BREAST BIOPSY Left     benign    CHOLECYSTECTOMY      COLONOSCOPY N/A 12/1/2017    Procedure: COLONOSCOPY;  Surgeon: Filemon Fuentes MD;  Location: [address];  Service: Endoscopy;  Laterality: N/A;    COLONOSCOPY N/A 12/5/2017    Procedure: COLONOSCOPY;  Surgeon: José Chavira MD;  Location: [address]);  Service: Endoscopy;  Laterality: N/A;    EXAMINATION UNDER ANESTHESIA N/A 2/18/2020    Procedure: EXAM UNDER ANESTHESIA;  Surgeon:  Alden Horowitz MD;  Location: [facility] OR;  Service: General;  Laterality: N/A;    EXCISIONAL HEMORRHOIDECTOMY N/A 2/18/2020    Procedure: HEMORRHOIDECTOMY;  Surgeon: Alden Horwoitz MD;  Location: [facility] OR;  Service: General;  Laterality: N/A;    HYSTERECTOMY      complete     INJECTION OF ANESTHETIC AGENT AROUND PUDENDAL NERVE N/A 2/18/2020    Procedure: BLOCK, NERVE, PUDENDAL;  Surgeon: Alden Horowitz MD;  Location: [facility] OR;  Service: General;  Laterality: N/A;    INJECTION OF ANESTHETIC AGENT INTO SACROILIAC JOINT Right 6/14/2019    Procedure: right Sacroiliac Joint Injection;  Surgeon: David Desai MD;  Location: [facility];  Service: Pain Management;  Laterality: Right;    INJECTION OF ANESTHETIC AGENT INTO SACROILIAC JOINT Bilateral 2/7/2020    Procedure: Bilateral GT bursa + Bilateral Sacroiliac Joint Injection;  Surgeon: David Desai MD;  Location: [facility];  Service: Pain Management;  Laterality: Bilateral;    INJECTION OF JOINT Bilateral 2/7/2020    Procedure: Bilateral GT bursa + Bilateral Sacroiliac Joint Injection;  Surgeon: David Desai MD;  Location: [facility];  Service: Pain Management;  Laterality: Bilateral;    INJECTION OF PIRIFORMIS MUSCLE Right 6/14/2019    Procedure: Right piriformis injection;  Surgeon: David Desai MD;  Location: [facility];  Service: Pain Management;  Laterality: Right;    LIVER TRANSPLANT  12/2017      ?   ALLERGIES:   Allergies as of 03/26/2020 - Reviewed 03/26/2020   Allergen Reaction Noted    Ferrous sulfate Other (See Comments) 01/10/2017      ?   MEDICATIONS:?   No outpatient medications have been marked as taking for the 3/26/20 encounter (Office Visit) with Baldo Kim MD.      ?   SOCIAL HISTORY:?   Social History     Tobacco Use    Smoking status: Never Smoker    Smokeless tobacco: Never Used   Substance Use Topics    Alcohol use: No     Frequency: Never     Comment: Currently admitted to inpatient rehab  7/2017        ?   FAMILY HISTORY:   family history includes Alzheimer's disease in her mother; Breast cancer in her paternal aunt; Depression in her maternal grandfather; Diabetes in her father and sister; Hypertension in her father and mother.   ?     Objective:      Physical Exam   Constitutional: She is oriented to person, place, and time. She appears well-developed and well-nourished. She is cooperative.  Non-toxic appearance. She appears ill. She appears distressed.   HENT:   Head: Normocephalic and atraumatic.   Mouth/Throat: No oropharyngeal exudate.   Eyes: Pupils are equal, round, and reactive to light. Conjunctivae are normal. Right eye exhibits no discharge. Left eye exhibits no discharge. No scleral icterus.   Neck: Normal range of motion. Neck supple. No thyromegaly present.   Cardiovascular: Normal rate and regular rhythm.   No murmur heard.  Pulmonary/Chest: Effort normal and breath sounds normal. No respiratory distress. She exhibits no tenderness.   Abdominal: Soft. Bowel sounds are normal. She exhibits no distension and no mass. There is no tenderness. There is no rebound and no guarding.   Musculoskeletal: Normal range of motion. She exhibits no edema or tenderness.   Lymphadenopathy:     She has no cervical adenopathy.        Right cervical: No superficial cervical adenopathy present.       Left cervical: No superficial cervical adenopathy present.        Right axillary: No pectoral adenopathy present.        Left axillary: No pectoral adenopathy present.       Right: No inguinal and no supraclavicular adenopathy present.        Left: No inguinal and no supraclavicular adenopathy present.   Neurological: She is alert and oriented to person, place, and time. No cranial nerve deficit or sensory deficit.   Skin: Skin is warm and dry. Capillary refill takes 2 to 3 seconds. No rash noted. No erythema. No pallor.   Psychiatric: She has a normal mood and affect. Her behavior is normal. Judgment normal.        ?   Vitals:    03/26/20 0823   BP: 124/83   Pulse: 89   Temp: 97.4 °F (36.3 °C)      ?     ECOG SCORE    1 - Restricted in strenuous activity-ambulatory and able to carry out work of a light nature       ?   Laboratory:  ?   Lab Visit on 03/26/2020   Component Date Value Ref Range Status    WBC 03/26/2020 9.89  3.90 - 12.70 K/uL Final    RBC 03/26/2020 4.83  4.00 - 5.40 M/uL Final    Hemoglobin 03/26/2020 12.1  12.0 - 16.0 g/dL Final    Hematocrit 03/26/2020 39.5  37.0 - 48.5 % Final    Mean Corpuscular Volume 03/26/2020 82  82 - 98 fL Final    Mean Corpuscular Hemoglobin 03/26/2020 25.1* 27.0 - 31.0 pg Final    Mean Corpuscular Hemoglobin Conc 03/26/2020 30.6* 32.0 - 36.0 g/dL Final    RDW 03/26/2020 14.6* 11.5 - 14.5 % Final    Platelets 03/26/2020 271  150 - 350 K/uL Final    MPV 03/26/2020 9.6  9.2 - 12.9 fL Final    Immature Granulocytes 03/26/2020 0.4  0.0 - 0.5 % Final    Gran # (ANC) 03/26/2020 7.4  1.8 - 7.7 K/uL Final    Immature Grans (Abs) 03/26/2020 0.04  0.00 - 0.04 K/uL Final    Lymph # 03/26/2020 1.8  1.0 - 4.8 K/uL Final    Mono # 03/26/2020 0.5  0.3 - 1.0 K/uL Final    Eos # 03/26/2020 0.1  0.0 - 0.5 K/uL Final    Baso # 03/26/2020 0.03  0.00 - 0.20 K/uL Final    nRBC 03/26/2020 0  0 /100 WBC Final    Gran% 03/26/2020 74.7* 38.0 - 73.0 % Final    Lymph% 03/26/2020 18.5  18.0 - 48.0 % Final    Mono% 03/26/2020 5.5  4.0 - 15.0 % Final    Eosinophil% 03/26/2020 0.6  0.0 - 8.0 % Final    Basophil% 03/26/2020 0.3  0.0 - 1.9 % Final    Differential Method 03/26/2020 Automated   Final    Sodium 03/26/2020 139  136 - 145 mmol/L Final    Potassium 03/26/2020 3.5  3.5 - 5.1 mmol/L Final    Chloride 03/26/2020 104  95 - 110 mmol/L Final    CO2 03/26/2020 27  23 - 29 mmol/L Final    Glucose 03/26/2020 96  70 - 110 mg/dL Final    BUN, Bld 03/26/2020 5* 6 - 20 mg/dL Final    Creatinine 03/26/2020 0.8  0.5 - 1.4 mg/dL Final    Calcium 03/26/2020 9.5  8.7 - 10.5 mg/dL Final     Total Protein 03/26/2020 7.4  6.0 - 8.4 g/dL Final    Albumin 03/26/2020 3.7  3.5 - 5.2 g/dL Final    Total Bilirubin 03/26/2020 0.4  0.1 - 1.0 mg/dL Final    Alkaline Phosphatase 03/26/2020 175* 55 - 135 U/L Final    AST 03/26/2020 11  10 - 40 U/L Final    ALT 03/26/2020 8* 10 - 44 U/L Final    Anion Gap 03/26/2020 8  8 - 16 mmol/L Final    eGFR if African American 03/26/2020 >60  >60 mL/min/1.73 m^2 Final    eGFR if non African American 03/26/2020 >60  >60 mL/min/1.73 m^2 Final    LD 03/26/2020 178  110 - 260 U/L Final    Sed Rate 03/26/2020 44* 0 - 20 mm/Hr Final    TSH 03/26/2020 0.828  0.400 - 4.000 uIU/mL Final    Free T4 03/26/2020 0.98  0.71 - 1.51 ng/dL Final   Lab Visit on 03/26/2020   Component Date Value Ref Range Status    WBC 03/26/2020 10.37  3.90 - 12.70 K/uL Final    RBC 03/26/2020 4.89  4.00 - 5.40 M/uL Final    Hemoglobin 03/26/2020 12.3  12.0 - 16.0 g/dL Final    Hematocrit 03/26/2020 40.1  37.0 - 48.5 % Final    Mean Corpuscular Volume 03/26/2020 82  82 - 98 fL Final    Mean Corpuscular Hemoglobin 03/26/2020 25.2* 27.0 - 31.0 pg Final    Mean Corpuscular Hemoglobin Conc 03/26/2020 30.7* 32.0 - 36.0 g/dL Final    RDW 03/26/2020 14.7* 11.5 - 14.5 % Final    Platelets 03/26/2020 269  150 - 350 K/uL Final    MPV 03/26/2020 9.8  9.2 - 12.9 fL Final    Immature Granulocytes 03/26/2020 0.3  0.0 - 0.5 % Final    Gran # (ANC) 03/26/2020 7.7  1.8 - 7.7 K/uL Final    Immature Grans (Abs) 03/26/2020 0.03  0.00 - 0.04 K/uL Final    Lymph # 03/26/2020 2.1  1.0 - 4.8 K/uL Final    Mono # 03/26/2020 0.5  0.3 - 1.0 K/uL Final    Eos # 03/26/2020 0.1  0.0 - 0.5 K/uL Final    Baso # 03/26/2020 0.03  0.00 - 0.20 K/uL Final    nRBC 03/26/2020 0  0 /100 WBC Final    Gran% 03/26/2020 73.8* 38.0 - 73.0 % Final    Lymph% 03/26/2020 20.3  18.0 - 48.0 % Final    Mono% 03/26/2020 4.7  4.0 - 15.0 % Final    Eosinophil% 03/26/2020 0.6  0.0 - 8.0 % Final    Basophil% 03/26/2020 0.3  0.0  - 1.9 % Final    Differential Method 03/26/2020 Automated   Final    Sodium 03/26/2020 141  136 - 145 mmol/L Final    Potassium 03/26/2020 3.8  3.5 - 5.1 mmol/L Final    Chloride 03/26/2020 104  95 - 110 mmol/L Final    CO2 03/26/2020 25  23 - 29 mmol/L Final    Glucose 03/26/2020 122* 70 - 110 mg/dL Final    BUN, Bld 03/26/2020 5* 6 - 20 mg/dL Final    Creatinine 03/26/2020 0.9  0.5 - 1.4 mg/dL Final    Calcium 03/26/2020 9.6  8.7 - 10.5 mg/dL Final    Total Protein 03/26/2020 7.5  6.0 - 8.4 g/dL Final    Albumin 03/26/2020 3.6  3.5 - 5.2 g/dL Final    Total Bilirubin 03/26/2020 0.4  0.1 - 1.0 mg/dL Final    Alkaline Phosphatase 03/26/2020 178* 55 - 135 U/L Final    AST 03/26/2020 9* 10 - 40 U/L Final    ALT 03/26/2020 8* 10 - 44 U/L Final    Anion Gap 03/26/2020 12  8 - 16 mmol/L Final    eGFR if African American 03/26/2020 >60  >60 mL/min/1.73 m^2 Final    eGFR if non African American 03/26/2020 >60  >60 mL/min/1.73 m^2 Final    TSH 03/26/2020 1.275  0.400 - 4.000 uIU/mL Final      ?   Tumor markers   ?   ?   Imaging: X-Ray Chest PA And Lateral  Narrative: EXAMINATION:  XR CHEST PA AND LATERAL    CLINICAL HISTORY:  Shortness of breath    TECHNIQUE:  PA and lateral views of the chest were performed.    COMPARISON:  February 27, 2020    FINDINGS:  No significant oval change.  Lungs symmetrically aerated and clear.  Smooth stable elevation right hemidiaphragm, midline trachea and sharp CP angles again noted.  Osseous structures intact.  Impression: Stable exam without acute infiltrate.    Electronically signed by: Wayne Guzman MD  Date:    03/26/2020  Time:    08:42     ?      Pathology:  Pathology Results  (Last 10 years)               02/18/20 0828  Specimen to Pathology, Surgery General Surgery Final result    Narrative:  Pre-op Diagnosis: Grade IV hemorrhoids [K64.3]   Rectal bleed [K62.5]   Procedure(s):   HEMORRHOIDECTOMY   BLOCK, NERVE, PUDENDAL   Number of specimens: 1   Name of  specimens:   1. Right posterior hemorrhoid lesion - PERM   Specimen total (fresh, frozen, permanent):->1              ?   Assessment/Plan:       1. Weight loss    2. Night sweats    3. Neutrophilic leukocytosis    4. At risk for opportunistic infections    5. Long-term use of immunosuppressant medication    6. Unintentional weight loss of 10% body weight within 6 months    7. Essential hypertension    8. Liver replaced by transplant          ?Unintentional weight loss of 10% body weight within 6 months  Unknown etiology.  Reviewed CT scan of abdomen and pelvis that showed no abnormal lymphadenopathy.  I did explain to patient that given the presence of B symptoms including night sweats and unintentional weight loss that there are few fence in my differentials:  1.  Lymphoma:  This is possible given her history of liver transplantation and currently on immunosuppressants.  I did explain to her that more than 10% of patients who are immunosuppressed following organ transplantation have risk higher than the general population of developing secondary malignancy including lymphoma.  Although CT scan of abdomen and pelvis was negative for lymphadenopathy, will proceed with PET-CT scan for further evaluation.  I will also order LDH and ESR.  2.  Neuroendocrine tumor:  Given her chronic diarrhea and palpitations, it is possible that she has neuroendocrine tumor secreting hormones that exacerbate her sympathetic nervous system.  To that effect will be checking chromogranin A and gastrin levels.  3.  Thyroid dysfunction which could also cause diarrhea and palpitation.  Will check thyroid function.  Patient is to return back in about 2-3 weeks to discuss results and management approach.    Essential hypertension  Management per PCP.    Liver replaced by transplant  Follows with Dr. Souza at hepatology department.  On immunosuppressants.     ?   ?   Follow-Up: Follow up in about 3 weeks (around 4/16/2020).    ALEX GUADALUPE  Md., Ph.D  Hematology & Oncology Department  Phone #: [phone]

## 2020-03-26 NOTE — LETTER
March 26, 2020      Jenny Suggs PA-C  [address]  [place] [address]           [facility]  [address] [place] [id number]  Phone: [phone]  Fax: [phone]          Patient: Marcie Bazan   MR Number: [id number]   YOB: 1965   Date of Visit: 3/26/2020       Dear Jenny Suggs:    Thank you for referring Marcie Bazan to me for evaluation. Attached you will find relevant portions of my assessment and plan of care.    If you have questions, please do not hesitate to call me. I look forward to following Marcie Bazan along with you.    Sincerely,    Felicita Munson MD    Enclosure  CC:  No Recipients    If you would like to receive this communication electronically, please contact [email or web address] or [phone] to request more information on [facility] [employer] access.    For providers and/or their staff who would like to refer a patient to [facility], please contact us through our one-stop-shop provider referral line, [facility] [place], at [phone].    If you feel you have received this communication in error or would no longer like to receive these types of communications, please e-mail [email or web address]

## 2020-03-26 NOTE — PROGRESS NOTES
Subjective:   Patient ID:  Marcie Bazan is a 54 y.o. female who presents for evaluation of Shortness of Breath (x5-6 months) and Palpitations      HPI  A 53 yo female with anemia liver transplant on immunosuppressive therapy hlp is referred from hematology oncology for evaluation of exertional shortness of breath that has been progressive since November that is mostly exertional she is very limited with walking capacity she is not able to compete any of her home tasks w/o stopping multiple times and restarting her activities. She has decreased appetite has night sweats has lost weight 30 lbs unintentionally. Her heart races with shortness of breath at times gets chest pain if she pushes herself to finish her task.she will be watching tv at times and sitting down she suddenly has heart racing. Her bnp is normal her last echo 2 years ago was normal. Has no leg swelling she snores at nite never been told she stops breathing but she thinks she wakes up shocking at nite. Has daytime sleepiness severe fatigue ekg  Unremarkable. Her blood count is stable her troponin is negative. She is on high dose vit d supplement. cxr within normal.  Past Medical History:   Diagnosis Date    Alcohol abuse     Alcoholic hepatitis     Anemia of chronic disease     Arthritis     generialized    Cancer 12/26/2017    liver    Coagulopathy     Dyspnea on exertion 3/26/2020    Encounter for blood transfusion     End stage liver disease     History of alcohol abuse     Hyperlipidemia     Hypertension     Hyponatremia     Liver cirrhosis     Liver transplant candidate 12/26/2017    Obesity (BMI 30-39.9) 1/5/2016       Past Surgical History:   Procedure Laterality Date    BREAST BIOPSY Left     benign    CHOLECYSTECTOMY      COLONOSCOPY N/A 12/1/2017    Procedure: COLONOSCOPY;  Surgeon: Filemon Fuentes MD;  Location: [facility] ([address] [place] [address]);  Service: Endoscopy;  Laterality: N/A;    COLONOSCOPY N/A 12/5/2017    Procedure:  COLONOSCOPY;  Surgeon: José Chavira MD;  Location: [facility] ENDO ([address] [place] [address]);  Service: Endoscopy;  Laterality: N/A;    EXAMINATION UNDER ANESTHESIA N/A 2/18/2020    Procedure: EXAM UNDER ANESTHESIA;  Surgeon: Alden Horowitz MD;  Location: [facility] OR;  Service: General;  Laterality: N/A;    EXCISIONAL HEMORRHOIDECTOMY N/A 2/18/2020    Procedure: HEMORRHOIDECTOMY;  Surgeon: Alden Horowitz MD;  Location: [facility] OR;  Service: General;  Laterality: N/A;    HYSTERECTOMY      complete     INJECTION OF ANESTHETIC AGENT AROUND PUDENDAL NERVE N/A 2/18/2020    Procedure: BLOCK, NERVE, PUDENDAL;  Surgeon: Alden Horowitz MD;  Location: [facility] OR;  Service: General;  Laterality: N/A;    INJECTION OF ANESTHETIC AGENT INTO SACROILIAC JOINT Right 6/14/2019    Procedure: right Sacroiliac Joint Injection;  Surgeon: David Desai MD;  Location: [facility] PAIN [facility];  Service: Pain Management;  Laterality: Right;    INJECTION OF ANESTHETIC AGENT INTO SACROILIAC JOINT Bilateral 2/7/2020    Procedure: Bilateral GT bursa + Bilateral Sacroiliac Joint Injection;  Surgeon: David Desai MD;  Location: [facility] PAIN [facility]GT;  Service: Pain Management;  Laterality: Bilateral;    INJECTION OF JOINT Bilateral 2/7/2020    Procedure: Bilateral GT bursa + Bilateral Sacroiliac Joint Injection;  Surgeon: David Desai MD;  Location: [facility] PAIN [facility]GT;  Service: Pain Management;  Laterality: Bilateral;    INJECTION OF PIRIFORMIS MUSCLE Right 6/14/2019    Procedure: Right piriformis injection;  Surgeon: David Desai MD;  Location: [facility] PAIN [facility]GT;  Service: Pain Management;  Laterality: Right;    LIVER TRANSPLANT  12/2017       Social History     Tobacco Use    Smoking status: Never Smoker    Smokeless tobacco: Never Used   Substance Use Topics    Alcohol use: No     Frequency: Never     Comment: Currently admitted to inpatient rehab 7/2017    Drug use: No       Family History   Problem Relation Age of Onset    Hypertension Mother      Alzheimer's disease Mother     Hypertension Father     Diabetes Father     Diabetes Sister     Depression Maternal Grandfather     Breast cancer Paternal Aunt        Current Outpatient Medications   Medication Sig    atorvastatin (LIPITOR) 40 MG tablet Take 1 tablet (40 mg total) by mouth every evening.    BIOTIN ORAL Take by mouth every evening.     cyclobenzaprine (FLEXERIL) 10 MG tablet TAKE 1/2 TO 1 TABLET BY MOUTH EVERY NIGHT AT BEDTIME AS NEEDED FOR MUSCLE SPASMS. MAY CAUSE DROWSINESS    ergocalciferol (ERGOCALCIFEROL) 50,000 unit Cap TAKE 1 CAPSULE BY MOUTH EVERY 7 DAYS    famotidine (PEPCID) 20 MG tablet Take 1 tablet (20 mg total) by mouth 2 (two) times daily.    furosemide (LASIX) 40 MG tablet TAKE 1 TABLET BY MOUTH DAILY.    gabapentin (NEURONTIN) 300 MG capsule TAKE 2 CAPSULES(600 MG) BY MOUTH TWICE DAILY (Patient taking differently: 900 mg. )    levocetirizine (XYZAL) 5 MG tablet TAKE 1 TABLET(5 MG) BY MOUTH EVERY EVENING    magnesium oxide (MAG-OX) 400 mg (241.3 mg magnesium) tablet Take 2 tablets (800 mg total) by mouth 2 (two) times daily.    methocarbamol (ROBAXIN) 750 MG Tab TAKE 1 TABLET BY MOUTH TWICE DAILY AS NEEDED FOR MUSCLE SPASMS.    mycophenolate (CELLCEPT) 250 mg Cap TAKE 2 CAPSULES(500 MG) BY MOUTH TWICE DAILY    NIFEdipine (PROCARDIA-XL) 30 MG (OSM) 24 hr tablet Take 1 tablet (30 mg total) by mouth once daily.    ondansetron (ZOFRAN-ODT) 4 MG TbDL DISSOLVE 1 TO 2 TABLETS ON THE TONGUE THREE TIMES DAILY AS NEEDED FOR NAUSEA    potassium chloride (KLOR-CON) 10 MEQ TbSR TAKE 2 TABLETS(20 MEQ) BY MOUTH EVERY DAY    senna-docusate 8.6-50 mg (PERICOLACE) 8.6-50 mg per tablet Take 1 tablet by mouth 2 (two) times daily. Take while taking narcotic pain medications    tacrolimus (PROGRAF) 1 MG Cap Take 2 capsules (2 mg total) by mouth every 12 (twelve) hours.    azelastine (ASTELIN) 137 mcg (0.1 %) nasal spray 2 sprays (274 mcg total) by Nasal route 2 (two) times daily as  needed for Rhinitis. (Patient not taking: Reported on 3/26/2020)    diclofenac sodium (VOLTAREN) 1 % Gel Apply 2 g topically 3 (three) times daily as needed. (Patient not taking: Reported on 3/26/2020)    oxyCODONE-acetaminophen (PERCOCET) 5-325 mg per tablet Take 1 tablet by mouth every 4 (four) hours as needed for Pain. (Patient not taking: Reported on 3/26/2020)     No current facility-administered medications for this visit.      Current Outpatient Medications on File Prior to Visit   Medication Sig    atorvastatin (LIPITOR) 40 MG tablet Take 1 tablet (40 mg total) by mouth every evening.    BIOTIN ORAL Take by mouth every evening.     cyclobenzaprine (FLEXERIL) 10 MG tablet TAKE 1/2 TO 1 TABLET BY MOUTH EVERY NIGHT AT BEDTIME AS NEEDED FOR MUSCLE SPASMS. MAY CAUSE DROWSINESS    ergocalciferol (ERGOCALCIFEROL) 50,000 unit Cap TAKE 1 CAPSULE BY MOUTH EVERY 7 DAYS    famotidine (PEPCID) 20 MG tablet Take 1 tablet (20 mg total) by mouth 2 (two) times daily.    furosemide (LASIX) 40 MG tablet TAKE 1 TABLET BY MOUTH DAILY.    gabapentin (NEURONTIN) 300 MG capsule TAKE 2 CAPSULES(600 MG) BY MOUTH TWICE DAILY (Patient taking differently: 900 mg. )    levocetirizine (XYZAL) 5 MG tablet TAKE 1 TABLET(5 MG) BY MOUTH EVERY EVENING    magnesium oxide (MAG-OX) 400 mg (241.3 mg magnesium) tablet Take 2 tablets (800 mg total) by mouth 2 (two) times daily.    methocarbamol (ROBAXIN) 750 MG Tab TAKE 1 TABLET BY MOUTH TWICE DAILY AS NEEDED FOR MUSCLE SPASMS.    mycophenolate (CELLCEPT) 250 mg Cap TAKE 2 CAPSULES(500 MG) BY MOUTH TWICE DAILY    NIFEdipine (PROCARDIA-XL) 30 MG (OSM) 24 hr tablet Take 1 tablet (30 mg total) by mouth once daily.    ondansetron (ZOFRAN-ODT) 4 MG TbDL DISSOLVE 1 TO 2 TABLETS ON THE TONGUE THREE TIMES DAILY AS NEEDED FOR NAUSEA    potassium chloride (KLOR-CON) 10 MEQ TbSR TAKE 2 TABLETS(20 MEQ) BY MOUTH EVERY DAY    senna-docusate 8.6-50 mg (PERICOLACE) 8.6-50 mg per tablet Take 1  tablet by mouth 2 (two) times daily. Take while taking narcotic pain medications    tacrolimus (PROGRAF) 1 MG Cap Take 2 capsules (2 mg total) by mouth every 12 (twelve) hours.    azelastine (ASTELIN) 137 mcg (0.1 %) nasal spray 2 sprays (274 mcg total) by Nasal route 2 (two) times daily as needed for Rhinitis. (Patient not taking: Reported on 3/26/2020)    diclofenac sodium (VOLTAREN) 1 % Gel Apply 2 g topically 3 (three) times daily as needed. (Patient not taking: Reported on 3/26/2020)    oxyCODONE-acetaminophen (PERCOCET) 5-325 mg per tablet Take 1 tablet by mouth every 4 (four) hours as needed for Pain. (Patient not taking: Reported on 3/26/2020)     No current facility-administered medications on file prior to visit.        Review of patient's allergies indicates:   Allergen Reactions    Ferrous sulfate Other (See Comments)     Patient states the pill makes her sick. She stated she would rather have a shot       Review of Systems   Constitution: Positive for decreased appetite, malaise/fatigue, night sweats and weight loss. Negative for diaphoresis and weight gain.   HENT: Negative for hoarse voice.    Eyes: Negative for double vision and visual disturbance.   Cardiovascular: Positive for chest pain, dyspnea on exertion and palpitations. Negative for claudication, cyanosis, irregular heartbeat, leg swelling, near-syncope, orthopnea, paroxysmal nocturnal dyspnea and syncope.   Respiratory: Positive for shortness of breath and snoring. Negative for cough and hemoptysis.    Hematologic/Lymphatic: Negative for bleeding problem. Does not bruise/bleed easily.   Skin: Negative for color change and poor wound healing.   Musculoskeletal: Negative for muscle cramps, muscle weakness and myalgias.   Gastrointestinal: Negative for bloating, abdominal pain, change in bowel habit, diarrhea, heartburn, hematemesis, hematochezia, melena and nausea.   Neurological: Negative for excessive daytime sleepiness, dizziness,  headaches, light-headedness, loss of balance, numbness and weakness.   Psychiatric/Behavioral: Negative for memory loss. The patient does not have insomnia.    Allergic/Immunologic: Negative for hives.       Objective:   Physical Exam   Constitutional: She is oriented to person, place, and time. She appears well-developed and well-nourished. She does not appear ill. No distress.   HENT:   Head: Normocephalic and atraumatic.   Eyes: Pupils are equal, round, and reactive to light. EOM are normal. No scleral icterus.   Neck: Normal range of motion. Neck supple. Normal carotid pulses, no hepatojugular reflux and no JVD present. Carotid bruit is not present. No tracheal deviation present. No thyromegaly present.   Cardiovascular: Normal rate, regular rhythm, normal heart sounds, intact distal pulses and normal pulses. Exam reveals no gallop and no friction rub.   No murmur heard.  Pulmonary/Chest: Effort normal and breath sounds normal. No respiratory distress. She has no wheezes. She has no rhonchi. She has no rales. She exhibits no tenderness.   Abdominal: Soft. Normal appearance, normal aorta and bowel sounds are normal. She exhibits no distension, no abdominal bruit, no ascites and no pulsatile midline mass. There is no hepatomegaly. There is no tenderness.   Obese abdomen.   Musculoskeletal: She exhibits no edema.        Right shoulder: She exhibits no deformity.   Neurological: She is alert and oriented to person, place, and time. She has normal strength. No cranial nerve deficit. Coordination normal.   Skin: Skin is warm and dry. No rash noted. She is not diaphoretic. No cyanosis or erythema. Nails show no clubbing.   Psychiatric: She has a normal mood and affect. Her speech is normal and behavior is normal.   Nursing note and vitals reviewed.    Vitals:    03/26/20 0955 03/26/20 1003   BP: 114/88 116/80   BP Location: Right arm Left arm   Patient Position: Sitting Sitting   BP Method: Large (Manual) Large  "(Manual)   Pulse: 85    SpO2: 95%    Weight: 81.4 kg (179 lb 7.3 oz)    Height: 5' 5" (1.651 m)      Lab Results   Component Value Date    CHOL 214 (H) 11/04/2019    CHOL 121 09/19/2018    CHOL 271 (H) 03/02/2018     Lab Results   Component Value Date    HDL 55 11/04/2019    HDL 38 (L) 09/19/2018    HDL 8 (L) 03/02/2018     Lab Results   Component Value Date    LDLCALC 143.4 11/04/2019    LDLCALC 59.0 (L) 09/19/2018    LDLCALC 216.0 (H) 03/02/2018     Lab Results   Component Value Date    TRIG 78 11/04/2019    TRIG 120 09/19/2018    TRIG 235 (H) 03/02/2018     Lab Results   Component Value Date    CHOLHDL 25.7 11/04/2019    CHOLHDL 31.4 09/19/2018    CHOLHDL 3.0 (L) 03/02/2018       Chemistry        Component Value Date/Time     03/26/2020 0924    K 3.5 03/26/2020 0924     03/26/2020 0924    CO2 27 03/26/2020 0924    BUN 5 (L) 03/26/2020 0924    CREATININE 0.8 03/26/2020 0924    GLU 96 03/26/2020 0924        Component Value Date/Time    CALCIUM 9.5 03/26/2020 0924    ALKPHOS 175 (H) 03/26/2020 0924    AST 11 03/26/2020 0924    ALT 8 (L) 03/26/2020 0924    BILITOT 0.4 03/26/2020 0924    ESTGFRAFRICA >60 03/26/2020 0924    EGFRNONAA >60 03/26/2020 0924          Lab Results   Component Value Date    TSH 1.275 03/26/2020     Lab Results   Component Value Date    INR 1.0 02/27/2020    INR 1.0 03/09/2018    INR 1.0 03/09/2018     Lab Results   Component Value Date    WBC 9.89 03/26/2020    HGB 12.1 03/26/2020    HCT 39.5 03/26/2020    MCV 82 03/26/2020     03/26/2020     BNP  @LABRCNTIP(BNP,BNPTRIAGEBLO)@  Estimated Creatinine Clearance: 82.8 mL/min (based on SCr of 0.8 mg/dL).  Assessment:     1. Essential hypertension    2. Anemia of chronic disease    3. History of abnormal cervical Pap smear    4. History of Helicobacter pylori infection    5. Chronic right-sided low back pain with right-sided sciatica    6. Multinodular thyroid    7. Hypomagnesemia    8. Liver replaced by transplant    9. Long-term " use of immunosuppressant medication    10. Accelerated liver transplant rejection    11. Mixed hyperlipidemia    12. Vitamin D deficiency    13. Obesity (BMI 30.0-34.9)    14. Dyspnea on exertion    patient ha ssignificant constuitutional symptoms of weight loss fatigue nightswetas dyspnea on exertion severe and limiting w/o evidence of infarct chf . Will need to w/u specially anemia is stabel. And vit d is being replaced. Malignancy is a consideration and will r/o any significant cardiovascular disorder of cardiomyopathy or ischemia.   In addition will r/o exercise desaturation.    Plan:   Echo holter  lexiscan '    6 minute walk test.  Phone review

## 2020-03-26 NOTE — ED PROVIDER NOTES
"SCRIBE #1 NOTE: I, Kirk Abdi, am scribing for, and in the presence of, Vicente Fischer MD. I have scribed the entire note.      History      Chief Complaint   Patient presents with    Chest Pain     chest pain, shortness of breath.  EMS stated "shaking" on scene       Review of patient's allergies indicates:   Allergen Reactions    Ferrous sulfate Other (See Comments)     Patient states the pill makes her sick. She stated she would rather have a shot        HPI   HPI    3/26/2020, 1:11 PM   History obtained from the patient and EMS      History of Present Illness: Marcie Bazan is a 54 y.o. female patient with a PMHx of alcoholic hepatitis s/p liver transplant who presents to the Emergency Department for chest pain, onset just PTA. Pt was sent to the ED from Cardiology clinic for further evaluation. Symptoms are intermittent and moderate in severity. No mitigating or exacerbating factors reported. Associated sxs include intermittent SOB and intermittent generalized weakness. EMS also reports that the pt had an episode of "shaking" at the scene. Patient denies any fever, chills, n/v/d, numbness, dizziness, headache, and all other sxs at this time. No prior Tx reported. Pt states that she has had similar episodes of chest pain, SOB, and weakness for the past 4 months, and is scheduled for a Holter monitor test. No further complaints or concerns at this time.     Arrival mode: EMS    PCP: Jenny Suggs PA-C       Past Medical History:  Past Medical History:   Diagnosis Date    Alcohol abuse     Alcoholic hepatitis     Anemia of chronic disease     Arthritis     generialized    Cancer 12/26/2017    liver    Coagulopathy     Dyspnea on exertion 3/26/2020    Encounter for blood transfusion     End stage liver disease     History of alcohol abuse     Hyperlipidemia     Hypertension     Hyponatremia     Liver cirrhosis     Liver transplant candidate 12/26/2017    Obesity (BMI 30-39.9) " 1/5/2016       Past Surgical History:  Past Surgical History:   Procedure Laterality Date    BREAST BIOPSY Left     benign    CHOLECYSTECTOMY      COLONOSCOPY N/A 12/1/2017    Procedure: COLONOSCOPY;  Surgeon: Filemon Fuentes MD;  Location: [address]);  Service: Endoscopy;  Laterality: N/A;    COLONOSCOPY N/A 12/5/2017    Procedure: COLONOSCOPY;  Surgeon: José Chavira MD;  [facility]  Service: Endoscopy;  Laterality: N/A;    EXAMINATION UNDER ANESTHESIA N/A 2/18/2020    Procedure: EXAM UNDER ANESTHESIA;  Surgeon: Alden Horowitz MD;  Location: [facility] OR;  Service: General;  Laterality: N/A;    EXCISIONAL HEMORRHOIDECTOMY N/A 2/18/2020    Procedure: HEMORRHOIDECTOMY;  Surgeon: Alden Horowitz MD;  Location: [facility] OR;  Service: General;  Laterality: N/A;    HYSTERECTOMY      complete     INJECTION OF ANESTHETIC AGENT AROUND PUDENDAL NERVE N/A 2/18/2020    Procedure: BLOCK, NERVE, PUDENDAL;  Surgeon: Alden Horowitz MD;  Location: [facility] OR;  Service: General;  Laterality: N/A;    INJECTION OF ANESTHETIC AGENT INTO SACROILIAC JOINT Right 6/14/2019    Procedure: right Sacroiliac Joint Injection;  Surgeon: David Desai MD;  Location: [facility] PAIN MGT;  Service: Pain Management;  Laterality: Right;    INJECTION OF ANESTHETIC AGENT INTO SACROILIAC JOINT Bilateral 2/7/2020    Procedure: Bilateral GT bursa + Bilateral Sacroiliac Joint Injection;  Surgeon: David Desai MD;  Location: [facility] PAIN MGT;  Service: Pain Management;  Laterality: Bilateral;    INJECTION OF JOINT Bilateral 2/7/2020    Procedure: Bilateral GT bursa + Bilateral Sacroiliac Joint Injection;  Surgeon: David Desai MD;  Location: [facility] PAIN MGT;  Service: Pain Management;  Laterality: Bilateral;    INJECTION OF PIRIFORMIS MUSCLE Right 6/14/2019    Procedure: Right piriformis injection;  Surgeon: David Desai MD;  Location: [facility] PAIN [facility]GT;  Service: Pain Management;  Laterality: Right;    LIVER  "TRANSPLANT  12/2017         Family History:  Family History   Problem Relation Age of Onset    Hypertension Mother     Alzheimer's disease Mother     Hypertension Father     Diabetes Father     Diabetes Sister     Depression Maternal Grandfather     Breast cancer Paternal Aunt        Social History:  Social History     Tobacco Use    Smoking status: Never Smoker    Smokeless tobacco: Never Used   Substance and Sexual Activity    Alcohol use: No     Frequency: Never     Comment: Currently admitted to inpatient rehab 7/2017    Drug use: No    Sexual activity: Not Currently       ROS   Review of Systems   Constitutional: Negative for chills, diaphoresis, fatigue and fever.   HENT: Negative for sore throat.    Respiratory: Positive for shortness of breath (intermittent).    Cardiovascular: Positive for chest pain (intermittent).   Gastrointestinal: Negative for diarrhea, nausea and vomiting.   Genitourinary: Negative for dysuria.   Musculoskeletal: Negative for back pain.   Skin: Negative for rash.   Neurological: Positive for tremors ("shaking" per EMS) and weakness (intermittent, generalized). Negative for dizziness, light-headedness, numbness and headaches.   Hematological: Does not bruise/bleed easily.   All other systems reviewed and are negative.    Physical Exam      Initial Vitals [03/26/20 1216]   BP Pulse Resp Temp SpO2   128/85 76 18 97.4 °F (36.3 °C) 100 %      MAP       --          Physical Exam  Nursing Notes and Vital Signs Reviewed.  Constitutional: Patient is in no acute distress. Well-developed and well-nourished.  Head: Atraumatic. Normocephalic.  Eyes: PERRL. EOM intact. Conjunctivae are not pale. No scleral icterus.  ENT: Mucous membranes are moist. Oropharynx is clear and symmetric.    Neck: Supple. Full ROM. No lymphadenopathy.  Cardiovascular: Regular rate. Regular rhythm. No murmurs, rubs, or gallops. Distal pulses are 2+ and symmetric.  Pulmonary/Chest: No respiratory distress. " Clear to auscultation bilaterally. No wheezing or rales.  Abdominal: Soft and non-distended.  There is no tenderness.  No rebound, guarding, or rigidity.   Musculoskeletal: Moves all extremities. No obvious deformities. No edema.  Skin: Warm and dry.  Neurological:  Alert, awake, and appropriate.  Normal speech.  No acute focal neurological deficits are appreciated.  Psychiatric: Normal affect. Good eye contact. Appropriate in content.    ED Course    Procedures  ED Vital Signs:  Vitals:    03/26/20 1216 03/26/20 1227 03/26/20 1231 03/26/20 1301   BP: 128/85  (!) 130/103 132/72   Pulse: 76 82 79 75   Resp: 18  16 19   Temp: 97.4 °F (36.3 °C)      TempSrc: Oral      SpO2: 100%  97% 98%   Weight:        03/26/20 1331 03/26/20 1334 03/26/20 1343 03/26/20 1431   BP: 129/64   126/75   Pulse:  78  79   Resp:  (!) 21  (!) 22   Temp:       TempSrc:       SpO2: 98% 98%  99%   Weight:   85.5 kg (188 lb 6.4 oz)     03/26/20 1531 03/26/20 1546 03/26/20 1548   BP: 130/72 137/74 134/81   Pulse: 77 78    Resp: (!) 21 19    Temp:   98.2 °F (36.8 °C)   TempSrc:   Oral   SpO2: 99% 100%    Weight:          Abnormal Lab Results:  Labs Reviewed   CBC W/ AUTO DIFFERENTIAL - Abnormal; Notable for the following components:       Result Value    Hemoglobin 11.8 (*)     Mean Corpuscular Volume 81 (*)     Mean Corpuscular Hemoglobin 24.6 (*)     Mean Corpuscular Hemoglobin Conc 30.4 (*)     RDW 14.7 (*)     Gran # (ANC) 8.3 (*)     Gran% 73.2 (*)     All other components within normal limits   COMPREHENSIVE METABOLIC PANEL - Abnormal; Notable for the following components:    BUN, Bld 4 (*)     Alkaline Phosphatase 171 (*)     ALT 8 (*)     All other components within normal limits   TSH - Abnormal; Notable for the following components:    TSH 0.132 (*)     All other components within normal limits   TROPONIN I   T4, FREE        All Lab Results:  Results for orders placed or performed during the hospital encounter of 03/26/20   CBC auto  differential   Result Value Ref Range    WBC 11.37 3.90 - 12.70 K/uL    RBC 4.79 4.00 - 5.40 M/uL    Hemoglobin 11.8 (L) 12.0 - 16.0 g/dL    Hematocrit 38.8 37.0 - 48.5 %    Mean Corpuscular Volume 81 (L) 82 - 98 fL    Mean Corpuscular Hemoglobin 24.6 (L) 27.0 - 31.0 pg    Mean Corpuscular Hemoglobin Conc 30.4 (L) 32.0 - 36.0 g/dL    RDW 14.7 (H) 11.5 - 14.5 %    Platelets 225 150 - 350 K/uL    MPV 10.6 9.2 - 12.9 fL    Immature Granulocytes 0.4 0.0 - 0.5 %    Gran # (ANC) 8.3 (H) 1.8 - 7.7 K/uL    Immature Grans (Abs) 0.04 0.00 - 0.04 K/uL    Lymph # 2.3 1.0 - 4.8 K/uL    Mono # 0.7 0.3 - 1.0 K/uL    Eos # 0.1 0.0 - 0.5 K/uL    Baso # 0.02 0.00 - 0.20 K/uL    nRBC 0 0 /100 WBC    Gran% 73.2 (H) 38.0 - 73.0 %    Lymph% 20.0 18.0 - 48.0 %    Mono% 5.8 4.0 - 15.0 %    Eosinophil% 0.4 0.0 - 8.0 %    Basophil% 0.2 0.0 - 1.9 %    Differential Method Automated    Comprehensive metabolic panel   Result Value Ref Range    Sodium 139 136 - 145 mmol/L    Potassium 4.0 3.5 - 5.1 mmol/L    Chloride 105 95 - 110 mmol/L    CO2 23 23 - 29 mmol/L    Glucose 95 70 - 110 mg/dL    BUN, Bld 4 (L) 6 - 20 mg/dL    Creatinine 0.8 0.5 - 1.4 mg/dL    Calcium 9.6 8.7 - 10.5 mg/dL    Total Protein 7.3 6.0 - 8.4 g/dL    Albumin 3.7 3.5 - 5.2 g/dL    Total Bilirubin 0.4 0.1 - 1.0 mg/dL    Alkaline Phosphatase 171 (H) 55 - 135 U/L    AST 12 10 - 40 U/L    ALT 8 (L) 10 - 44 U/L    Anion Gap 11 8 - 16 mmol/L    eGFR if African American >60 >60 mL/min/1.73 m^2    eGFR if non African American >60 >60 mL/min/1.73 m^2   Troponin I   Result Value Ref Range    Troponin I <0.006 0.000 - 0.026 ng/mL   TSH   Result Value Ref Range    TSH 0.132 (L) 0.400 - 4.000 uIU/mL   T4, free   Result Value Ref Range    Free T4 0.97 0.71 - 1.51 ng/dL     Imaging Results:  Imaging Results          X-Ray Chest AP Portable (Final result)  Result time 03/26/20 13:49:17    Final result by Dontae Jones MD (03/26/20 13:49:17)                 Impression:      No acute  abnormality.      Electronically signed by: Dontae Jones  Date:    03/26/2020  Time:    13:49             Narrative:    EXAMINATION:  XR CHEST AP PORTABLE    CLINICAL HISTORY:  . Shortness of breath    TECHNIQUE:  Single frontal portable view of the chest was performed.    COMPARISON:  03/26/2020    FINDINGS:  Support devices: None    The lungs are clear, with normal appearance of pulmonary vasculature and no pleural effusion or pneumothorax.    The cardiac silhouette is normal in size. The hilar and mediastinal contours are unremarkable.    Bones are intact.                               The EKG was ordered, reviewed, and independently interpreted by the ED provider.  Interpretation time: 12:22  Rate: 76 BPM  Rhythm: Sinus rhythm with short PE  Interpretation: No acute ST changes. No STEMI.           The Emergency Provider reviewed the vital signs and test results, which are outlined above.    ED Discussion     3:00 PM: Presentiation is most consistent with paroxysmal afib, but with no episodes occuring in the ED. Pt to follow up with Cardiology for Holter monitor.    3:26 PM: Reassessed pt at this time. Pt states that her sxs have resolved at this time, and has had no episodes of chest pain/SOB/weakness in the ED. Discussed with pt all pertinent ED information and results. Discussed pt dx and plan of tx. Gave pt all f/u and return to the ED instructions. All questions and concerns were addressed at this time. Pt expresses understanding of information and instructions, and is comfortable with plan to discharge. Pt is stable for discharge.    I have discussed with patient and/or family/caretaker chest pain precautions, specifically to return for worsening chest pain, shortness of breath, fever, or any concern.  I have low suspicion for cardiopulmonary, vascular, infectious, respiratory, or other emergent medical condition based on my evaluation in the ED.    I discussed with patient and/or family/caretaker that  evaluation in the ED does not suggest any emergent or life threatening medical conditions requiring immediate intervention beyond what was provided in the ED, and I believe patient is safe for discharge.  Regardless, an unremarkable evaluation in the ED does not preclude the development or presence of a serious of life threatening condition. As such, patient was instructed to return immediately for any worsening or change in current symptoms.         ED Medication(s):  Medications - No data to display    Follow-up Information     Schedule an appointment as soon as possible for a visit  with Jenny Suggs PA-C.    Specialty:  Internal Medicine  Contact information:  [id number]  [place] [address]  [phone]             [facility] - .    Specialty:  Emergency Medicine  Why:  As needed, If symptoms worsen  Contact information:  [address] [facility] [id number] [phone]                Discharge Medication List as of 3/26/2020  3:34 PM            Medical Decision Making    Medical Decision Making:   Clinical Tests:   Lab Tests: Ordered and Reviewed  Radiological Study: Ordered and Reviewed  Medical Tests: Ordered and Reviewed           Scribe Attestation:   Scribe #1: I performed the above scribed service and the documentation accurately describes the services I performed. I attest to the accuracy of the note.    Attending:   Physician Attestation Statement for Scribe #1: I, Vicente Fischer MD, personally performed the services described in this documentation, as scribed by Kirk Patton, in my presence, and it is both accurate and complete.          Clinical Impression       ICD-10-CM ICD-9-CM   1. Episodic weakness R53.1 728.87   2. Chest pain R07.9 786.50   3. SOB (shortness of breath) R06.02 786.05       Disposition:   Disposition: Discharged  Condition: Stable         Vicente Fischer MD  03/27/20 2149

## 2020-03-27 ENCOUNTER — TELEPHONE (OUTPATIENT)
Dept: CARDIOLOGY | Facility: CLINIC | Age: 55
End: 2020-03-27

## 2020-03-27 ENCOUNTER — HOSPITAL ENCOUNTER (OUTPATIENT)
Dept: RADIOLOGY | Facility: HOSPITAL | Age: 55
Discharge: HOME | End: 2020-03-27
Attending: INTERNAL MEDICINE
Payer: MEDICAID

## 2020-03-27 ENCOUNTER — HOSPITAL ENCOUNTER (OUTPATIENT)
Dept: CARDIOLOGY | Facility: HOSPITAL | Age: 55
Discharge: HOME | End: 2020-03-27
Attending: INTERNAL MEDICINE
Payer: MEDICAID

## 2020-03-27 ENCOUNTER — PATIENT OUTREACH (OUTPATIENT)
Dept: ADMINISTRATIVE | Facility: OTHER | Age: 55
End: 2020-03-27

## 2020-03-27 VITALS
SYSTOLIC BLOOD PRESSURE: 120 MMHG | BODY MASS INDEX: 31.32 KG/M2 | WEIGHT: 188 LBS | HEIGHT: 65 IN | DIASTOLIC BLOOD PRESSURE: 80 MMHG

## 2020-03-27 DIAGNOSIS — D63.8 ANEMIA OF CHRONIC DISEASE: ICD-10-CM

## 2020-03-27 DIAGNOSIS — I10 ESSENTIAL HYPERTENSION: Chronic | ICD-10-CM

## 2020-03-27 DIAGNOSIS — E66.9 OBESITY (BMI 30.0-34.9): ICD-10-CM

## 2020-03-27 DIAGNOSIS — R06.09 DYSPNEA ON EXERTION: ICD-10-CM

## 2020-03-27 DIAGNOSIS — I10 ESSENTIAL HYPERTENSION: ICD-10-CM

## 2020-03-27 LAB
AORTIC ROOT ANNULUS: 2.7 CM
ASCENDING AORTA: 2.67 CM
AV INDEX (PROSTH): 0.78
AV MEAN GRADIENT: 4 MMHG
AV PEAK GRADIENT: 7 MMHG
AV VALVE AREA: 2.23 CM2
AV VELOCITY RATIO: 0.89
BSA FOR ECHO PROCEDURE: 1.98 M2
CV ECHO LV RWT: 0.54 CM
CV PHARM DOSE: 0.4 MG
CV STRESS BASE HR: 90 BPM
DIASTOLIC BLOOD PRESSURE: 81 MMHG
DOP CALC AO PEAK VEL: 1.31 M/S
DOP CALC AO VTI: 26.25 CM
DOP CALC LVOT AREA: 2.9 CM2
DOP CALC LVOT DIAMETER: 1.91 CM
DOP CALC LVOT PEAK VEL: 1.16 M/S
DOP CALC LVOT STROKE VOLUME: 58.51 CM3
DOP CALCLVOT PEAK VEL VTI: 20.43 CM
E WAVE DECELERATION TIME: 151.24 MSEC
E/A RATIO: 0.84
E/E' RATIO: 9.25 M/S
ECHO LV POSTERIOR WALL: 0.99 CM (ref 0.6–1.1)
FRACTIONAL SHORTENING: 37 % (ref 28–44)
INTERVENTRICULAR SEPTUM: 0.91 CM (ref 0.6–1.1)
IVRT: 97.05 MSEC
LA MAJOR: 3.5 CM
LA MINOR: 4.2 CM
LA WIDTH: 2.82 CM
LEFT ATRIUM SIZE: 3.43 CM
LEFT ATRIUM VOLUME INDEX: 16.3 ML/M2
LEFT ATRIUM VOLUME: 31.39 CM3
LEFT INTERNAL DIMENSION IN SYSTOLE: 2.33 CM (ref 2.1–4)
LEFT VENTRICLE DIASTOLIC VOLUME INDEX: 30.09 ML/M2
LEFT VENTRICLE DIASTOLIC VOLUME: 57.98 ML
LEFT VENTRICLE MASS INDEX: 54 G/M2
LEFT VENTRICLE SYSTOLIC VOLUME INDEX: 9.7 ML/M2
LEFT VENTRICLE SYSTOLIC VOLUME: 18.73 ML
LEFT VENTRICULAR INTERNAL DIMENSION IN DIASTOLE: 3.7 CM (ref 3.5–6)
LEFT VENTRICULAR MASS: 104.57 G
LV LATERAL E/E' RATIO: 9.25 M/S
LV SEPTAL E/E' RATIO: 9.25 M/S
MV PEAK A VEL: 0.88 M/S
MV PEAK E VEL: 0.74 M/S
NUC REST EJECTION FRACTION: 81
NUC STRESS EJECTION FRACTION: 74 %
OHS CV CPX 85 PERCENT MAX PREDICTED HEART RATE MALE: 133
OHS CV CPX MAX PREDICTED HEART RATE: 157
OHS CV CPX PATIENT IS FEMALE: 1
OHS CV CPX PATIENT IS MALE: 0
OHS CV CPX PEAK DIASTOLIC BLOOD PRESSURE: 79 MMHG
OHS CV CPX PEAK HEAR RATE: 103 BPM
OHS CV CPX PEAK RATE PRESSURE PRODUCT: NORMAL
OHS CV CPX PEAK SYSTOLIC BLOOD PRESSURE: 124 MMHG
OHS CV CPX PERCENT MAX PREDICTED HEART RATE ACHIEVED: 66
OHS CV CPX RATE PRESSURE PRODUCT PRESENTING: NORMAL
PISA TR MAX VEL: 2.33 M/S
PULM VEIN S/D RATIO: 1.56
PV PEAK D VEL: 0.34 M/S
PV PEAK S VEL: 0.53 M/S
PV PEAK VELOCITY: 1.02 CM/S
RA MAJOR: 3.7 CM
RA PRESSURE: 3 MMHG
RA WIDTH: 2.21 CM
RIGHT VENTRICULAR END-DIASTOLIC DIMENSION: 2.33 CM
SINUS: 2.75 CM
STJ: 2.44 CM
STRESS ECHO POST EXERCISE DUR MIN: 1 MINUTES
STRESS ECHO POST EXERCISE DUR SEC: 7 SECONDS
STRESS ECHO TARGET HR: 139 BPM
SYSTOLIC BLOOD PRESSURE: 126 MMHG
TDI LATERAL: 0.08 M/S
TDI SEPTAL: 0.08 M/S
TDI: 0.08 M/S
TR MAX PG: 22 MMHG
TRICUSPID ANNULAR PLANE SYSTOLIC EXCURSION: 1.94 CM
TV REST PULMONARY ARTERY PRESSURE: 25 MMHG

## 2020-03-27 PROCEDURE — 93018 CV STRESS TEST I&R ONLY: CPT | Mod: ,,, | Performed by: INTERNAL MEDICINE

## 2020-03-27 PROCEDURE — 93306 ECHO (CUPID ONLY): ICD-10-PCS | Mod: 26,,, | Performed by: INTERNAL MEDICINE

## 2020-03-27 PROCEDURE — 93306 TTE W/DOPPLER COMPLETE: CPT | Mod: 26,,, | Performed by: INTERNAL MEDICINE

## 2020-03-27 PROCEDURE — 93016 CV STRESS TEST SUPVJ ONLY: CPT | Mod: ,,, | Performed by: INTERNAL MEDICINE

## 2020-03-27 PROCEDURE — 93227 XTRNL ECG REC<48 HR R&I: CPT | Mod: ,,, | Performed by: INTERNAL MEDICINE

## 2020-03-27 PROCEDURE — 93016 STRESS TEST WITH MYOCARDIAL PERFUSION (CUPID ONLY): ICD-10-PCS | Mod: ,,, | Performed by: INTERNAL MEDICINE

## 2020-03-27 PROCEDURE — 78451 HT MUSCLE IMAGE SPECT SING: CPT | Mod: 26,,, | Performed by: INTERNAL MEDICINE

## 2020-03-27 PROCEDURE — 93017 CV STRESS TEST TRACING ONLY: CPT

## 2020-03-27 PROCEDURE — 93018 STRESS TEST WITH MYOCARDIAL PERFUSION (CUPID ONLY): ICD-10-PCS | Mod: ,,, | Performed by: INTERNAL MEDICINE

## 2020-03-27 PROCEDURE — 93225 XTRNL ECG REC<48 HRS REC: CPT

## 2020-03-27 PROCEDURE — A9502 TC99M TETROFOSMIN: HCPCS

## 2020-03-27 PROCEDURE — 78451 STRESS TEST WITH MYOCARDIAL PERFUSION (CUPID ONLY): ICD-10-PCS | Mod: 26,,, | Performed by: INTERNAL MEDICINE

## 2020-03-27 PROCEDURE — 93227 HOLTER MONITOR - 48 HOUR (CUPID ONLY): ICD-10-PCS | Mod: ,,, | Performed by: INTERNAL MEDICINE

## 2020-03-27 PROCEDURE — 93306 TTE W/DOPPLER COMPLETE: CPT

## 2020-03-27 RX ORDER — AMINOPHYLLINE 25 MG/ML
75 INJECTION, SOLUTION INTRAVENOUS ONCE
Status: COMPLETED | OUTPATIENT
Start: 2020-03-27 | End: 2020-03-27

## 2020-03-27 RX ORDER — REGADENOSON 0.08 MG/ML
0.4 INJECTION, SOLUTION INTRAVENOUS ONCE
Status: COMPLETED | OUTPATIENT
Start: 2020-03-27 | End: 2020-03-27

## 2020-03-27 RX ADMIN — REGADENOSON 0.4 MG: 0.08 INJECTION, SOLUTION INTRAVENOUS at 11:03

## 2020-03-27 RX ADMIN — AMINOPHYLLINE 75 MG: 25 INJECTION, SOLUTION INTRAVENOUS at 12:03

## 2020-03-30 ENCOUNTER — PATIENT OUTREACH (OUTPATIENT)
Dept: ADMINISTRATIVE | Facility: HOSPITAL | Age: 55
End: 2020-03-30

## 2020-03-30 ENCOUNTER — OFFICE VISIT (OUTPATIENT)
Dept: SURGERY | Facility: CLINIC | Age: 55
End: 2020-03-30
Payer: MEDICAID

## 2020-03-30 DIAGNOSIS — K64.9 HEMORRHOIDS, UNSPECIFIED HEMORRHOID TYPE: Primary | ICD-10-CM

## 2020-03-30 LAB
OHS CV EVENT MONITOR DAY: 2
OHS CV HOLTER LENGTH DECIMAL HOURS: 96
OHS CV HOLTER LENGTH HOURS: 48
OHS CV HOLTER LENGTH MINUTES: 0

## 2020-03-30 PROCEDURE — 99024 PR POST-OP FOLLOW-UP VISIT: ICD-10-PCS | Mod: 95,,, | Performed by: COLON & RECTAL SURGERY

## 2020-03-30 PROCEDURE — 99024 POSTOP FOLLOW-UP VISIT: CPT | Mod: 95,,, | Performed by: COLON & RECTAL SURGERY

## 2020-03-30 NOTE — TELEPHONE ENCOUNTER
"Recalled patient and advised of stress and echo results advised Dr. Munson really wants her to see her Hematologist/Oncology MD.  Patient acknowledged she'd seen them and was waiting on final reports from them also.  Also advised would call later with holter monitor final results but so far her "heart results are good that is not showing her symptoms related to it"      Felicita Munson MD   Echo Results          Heart function is normal        "

## 2020-03-30 NOTE — PROGRESS NOTES
The patient location is: [facility]  The chief complaint leading to consultation is: hemorrhoid surgery  Visit type: Virtual visit with synchronous audio and video  Total time spent with patient: 10 min  Each patient to whom he or she provides medical services by telemedicine is:  (1) informed of the relationship between the physician and patient and the respective role of any other health care provider with respect to management of the patient; and (2) notified that he or she may decline to receive medical services by telemedicine and may withdraw from such care at any time.    Notes:   History & Physical    SUBJECTIVE:     No chief complaint on file.  Ref: DEMETRIUS Hodges    History of Present Illness:  Patient is a 54 y.o. female presents for evaluation of rectal bleeding and possible hemorrhoids.  Patient has had a liver transplant in December 2017 and has done well since that time.  However, after surgery she has noticed that she has bleeding with each bowel movement that occurs when wiping after bowel movements.  This bleeding is never painful.  She states the bleeding occurs each time she has a bowel movement around 4 times per day.  It has been consistent for 2 years now.  She states that she has a bowel movement 4 times per day, it is never hard, it is usually soft an loose, she takes no stool softeners and fiber supplementation.  She does drink at least 64 oz of water per day, does not have to strain to have bowel movement spends less than 5 min on the toilet per bowel movement.  She did undergo colonoscopy in December 2017 where no lesions were found but hemorrhoids were seen on exam.  She is on Prograf and CellCept for immunosuppression for her transplant.    2/18/2020: Hemorrhoidectomy    Interval history:  Since last clinic visit, the patient underwent excisional hemorrhoidectomy on 02/18/2020.  Final pathology showed a benign lesion.  Since surgery, the patient has had no pain and has felt  well.  She states the area is completely healed.  She no longer sees any rectal bleeding or prolapsing tissue as she still before surgery.  She denies any fever, chills, nausea, vomiting.      Review of patient's allergies indicates:   Allergen Reactions    Ferrous sulfate Other (See Comments)     Patient states the pill makes her sick. She stated she would rather have a shot       Current Outpatient Medications   Medication Sig Dispense Refill    atorvastatin (LIPITOR) 40 MG tablet Take 1 tablet (40 mg total) by mouth every evening. 90 tablet 3    azelastine (ASTELIN) 137 mcg (0.1 %) nasal spray 2 sprays (274 mcg total) by Nasal route 2 (two) times daily as needed for Rhinitis. (Patient not taking: Reported on 3/26/2020) 30 mL 5    BIOTIN ORAL Take by mouth every evening.       cyclobenzaprine (FLEXERIL) 10 MG tablet TAKE 1/2 TO 1 TABLET BY MOUTH EVERY NIGHT AT BEDTIME AS NEEDED FOR MUSCLE SPASMS. MAY CAUSE DROWSINESS 30 tablet 0    diclofenac sodium (VOLTAREN) 1 % Gel Apply 2 g topically 3 (three) times daily as needed. (Patient not taking: Reported on 3/26/2020) 2 Tube 6    ergocalciferol (ERGOCALCIFEROL) 50,000 unit Cap TAKE 1 CAPSULE BY MOUTH EVERY 7 DAYS 12 capsule 0    famotidine (PEPCID) 20 MG tablet Take 1 tablet (20 mg total) by mouth 2 (two) times daily. 60 tablet 5    furosemide (LASIX) 40 MG tablet TAKE 1 TABLET BY MOUTH DAILY. 30 tablet 0    gabapentin (NEURONTIN) 300 MG capsule TAKE 2 CAPSULES(600 MG) BY MOUTH TWICE DAILY (Patient taking differently: 900 mg. ) 120 capsule 2    levocetirizine (XYZAL) 5 MG tablet TAKE 1 TABLET(5 MG) BY MOUTH EVERY EVENING 30 tablet 0    magnesium oxide (MAG-OX) 400 mg (241.3 mg magnesium) tablet Take 2 tablets (800 mg total) by mouth 2 (two) times daily. 120 tablet 6    methocarbamol (ROBAXIN) 750 MG Tab TAKE 1 TABLET BY MOUTH TWICE DAILY AS NEEDED FOR MUSCLE SPASMS. 60 tablet 0    mycophenolate (CELLCEPT) 250 mg Cap TAKE 2 CAPSULES(500 MG) BY MOUTH TWICE  DAILY 120 capsule 2    NIFEdipine (PROCARDIA-XL) 30 MG (OSM) 24 hr tablet Take 1 tablet (30 mg total) by mouth once daily. 90 tablet 3    ondansetron (ZOFRAN-ODT) 4 MG TbDL DISSOLVE 1 TO 2 TABLETS ON THE TONGUE THREE TIMES DAILY AS NEEDED FOR NAUSEA 20 tablet 0    oxyCODONE-acetaminophen (PERCOCET) 5-325 mg per tablet Take 1 tablet by mouth every 4 (four) hours as needed for Pain. (Patient not taking: Reported on 3/26/2020) 25 tablet 0    potassium chloride (KLOR-CON) 10 MEQ TbSR TAKE 2 TABLETS(20 MEQ) BY MOUTH EVERY DAY 60 tablet 2    senna-docusate 8.6-50 mg (PERICOLACE) 8.6-50 mg per tablet Take 1 tablet by mouth 2 (two) times daily. Take while taking narcotic pain medications 28 tablet 0    tacrolimus (PROGRAF) 1 MG Cap Take 2 capsules (2 mg total) by mouth every 12 (twelve) hours. 120 capsule 11     No current facility-administered medications for this visit.        Past Medical History:   Diagnosis Date    Alcohol abuse     Alcoholic hepatitis     Anemia of chronic disease     Arthritis     generialized    Cancer 12/26/2017    liver    Coagulopathy     Dyspnea on exertion 3/26/2020    Encounter for blood transfusion     End stage liver disease     History of alcohol abuse     Hyperlipidemia     Hypertension     Hyponatremia     Liver cirrhosis     Liver transplant candidate 12/26/2017    Obesity (BMI 30-39.9) 1/5/2016     Past Surgical History:   Procedure Laterality Date    BREAST BIOPSY Left     benign    CHOLECYSTECTOMY      COLONOSCOPY N/A 12/1/2017    Procedure: COLONOSCOPY;  Surgeon: Filemon Fuentes MD;  [facility] [place] [address]);  Service: Endoscopy;  Laterality: N/A;    COLONOSCOPY N/A 12/5/2017    Procedure: COLONOSCOPY;  Surgeon: José Chavira MD;  [facility]  Service: Endoscopy;  Laterality: N/A;    EXAMINATION UNDER ANESTHESIA N/A 2/18/2020    Procedure: EXAM UNDER ANESTHESIA;  Surgeon: Alden Horowitz MD;  Location[facility];  Service:  General;  Laterality: N/A;    EXCISIONAL HEMORRHOIDECTOMY N/A 2/18/2020    Procedure: HEMORRHOIDECTOMY;  Surgeon: Alden Horowitz MD;  Location: [facility];  Service: General;  Laterality: N/A;    HYSTERECTOMY      complete     INJECTION OF ANESTHETIC AGENT AROUND PUDENDAL NERVE N/A 2/18/2020    Procedure: BLOCK, NERVE, PUDENDAL;  Surgeon: Alden Horowitz MD;  Location: [facility] OR;  Service: General;  Laterality: N/A;    INJECTION OF ANESTHETIC AGENT INTO SACROILIAC JOINT Right 6/14/2019    Procedure: right Sacroiliac Joint Injection;  Surgeon: David Desai MD;  Location: [facility];  Service: Pain Management;  Laterality: Right;    INJECTION OF ANESTHETIC AGENT INTO SACROILIAC JOINT Bilateral 2/7/2020    Procedure: Bilateral GT bursa + Bilateral Sacroiliac Joint Injection;  Surgeon: David Desai MD;  Location: [facility];  Service: Pain Management;  Laterality: Bilateral;    INJECTION OF JOINT Bilateral 2/7/2020    Procedure: Bilateral GT bursa + Bilateral Sacroiliac Joint Injection;  Surgeon: David Desai MD;  Location: [facility];  Service: Pain Management;  Laterality: Bilateral;    INJECTION OF PIRIFORMIS MUSCLE Right 6/14/2019    Procedure: Right piriformis injection;  Surgeon: aDvid Desai MD;  Location: [facility];  Service: Pain Management;  Laterality: Right;    LIVER TRANSPLANT  12/2017     Family History   Problem Relation Age of Onset    Hypertension Mother     Alzheimer's disease Mother     Hypertension Father     Diabetes Father     Diabetes Sister     Depression Maternal Grandfather     Breast cancer Paternal Aunt      Social History     Tobacco Use    Smoking status: Never Smoker    Smokeless tobacco: Never Used   Substance Use Topics    Alcohol use: No     Frequency: Never     Comment: Currently admitted to inpatient rehab 7/2017    Drug use: No        Review of Systems:  Review of Systems   Constitutional: Negative for activity change, appetite  change, chills, fatigue, fever and unexpected weight change.   HENT: Negative for congestion, ear pain, sore throat and trouble swallowing.    Eyes: Negative for pain, redness and itching.   Respiratory: Negative for cough, shortness of breath and wheezing.    Cardiovascular: Negative for chest pain, palpitations and leg swelling.   Gastrointestinal: Negative for abdominal distention, abdominal pain, anal bleeding, constipation, diarrhea, nausea, rectal pain and vomiting.   Endocrine: Negative for cold intolerance, heat intolerance and polyuria.   Genitourinary: Negative for dysuria, flank pain, frequency and hematuria.   Musculoskeletal: Negative for gait problem, joint swelling and neck pain.   Skin: Negative for color change, rash and wound.   Allergic/Immunologic: Positive for immunocompromised state. Negative for environmental allergies.   Neurological: Negative for dizziness, speech difficulty, weakness and numbness.   Psychiatric/Behavioral: Negative for agitation, confusion and hallucinations.       OBJECTIVE:       Laboratory  Lab Results   Component Value Date    WBC 11.37 03/26/2020    HGB 11.8 (L) 03/26/2020    HCT 38.8 03/26/2020     03/26/2020    CHOL 214 (H) 11/04/2019    TRIG 78 11/04/2019    HDL 55 11/04/2019    ALT 8 (L) 03/26/2020    AST 12 03/26/2020     03/26/2020    K 4.0 03/26/2020     03/26/2020    CREATININE 0.8 03/26/2020    BUN 4 (L) 03/26/2020    CO2 23 03/26/2020    TSH 0.132 (L) 03/26/2020    INR 1.0 02/27/2020    HGBA1C 4.2 03/01/2018         Diagnostic Results:  Reviewed previous colonoscopy report showing no polyps found and hemorrhoids seen.    PATHOLOGY:  Right posterior hemorrhoid lesion:  - Squamous mucosa with reactive changes.  - Colorectal mucosa showing cloacogenic polyp with surface erosion and reactive changes.  - Underlying blood vessle congestion.    ASSESSMENT/PLAN:     54-year-old female with a 2 year history of bleeding after bowel movements when  wiping with exam consistent today with possible prolapsed internal hemorrhoid versus abnormal growth who is s/p excisional hemorrhoidectomy in Feb 2020 who has recovered well postop    - given the benign nature of the lesion, no further intervention is required at this time  - patient has completely recovered from surgery and has no postoperative issues  - RTC p.r.n.    Alden Horowitz MD  Colon and Rectal Surgery  [facility]

## 2020-04-03 ENCOUNTER — HOSPITAL ENCOUNTER (OUTPATIENT)
Dept: RADIOLOGY | Facility: HOSPITAL | Age: 55
Discharge: HOME | End: 2020-04-03
Attending: INTERNAL MEDICINE
Payer: MEDICAID

## 2020-04-03 DIAGNOSIS — R61 NIGHT SWEATS: ICD-10-CM

## 2020-04-03 DIAGNOSIS — D72.9 NEUTROPHILIC LEUKOCYTOSIS: ICD-10-CM

## 2020-04-03 DIAGNOSIS — R63.4 WEIGHT LOSS: ICD-10-CM

## 2020-04-03 PROCEDURE — 78815 PET IMAGE W/CT SKULL-THIGH: CPT | Mod: 26,PI,, | Performed by: RADIOLOGY

## 2020-04-03 PROCEDURE — 78815 NM PET CT ROUTINE: ICD-10-PCS | Mod: 26,PI,, | Performed by: RADIOLOGY

## 2020-04-03 PROCEDURE — 78815 PET IMAGE W/CT SKULL-THIGH: CPT | Mod: TC

## 2020-04-03 PROCEDURE — A9552 F18 FDG: HCPCS

## 2020-04-06 ENCOUNTER — TELEPHONE (OUTPATIENT)
Dept: GASTROENTEROLOGY | Facility: CLINIC | Age: 55
End: 2020-04-06

## 2020-04-06 ENCOUNTER — PATIENT OUTREACH (OUTPATIENT)
Dept: ADMINISTRATIVE | Facility: OTHER | Age: 55
End: 2020-04-06

## 2020-04-06 ENCOUNTER — OFFICE VISIT (OUTPATIENT)
Dept: GASTROENTEROLOGY | Facility: CLINIC | Age: 55
End: 2020-04-06
Payer: MEDICAID

## 2020-04-06 DIAGNOSIS — R19.7 DIARRHEA, UNSPECIFIED TYPE: ICD-10-CM

## 2020-04-06 DIAGNOSIS — R06.02 SOB (SHORTNESS OF BREATH): ICD-10-CM

## 2020-04-06 DIAGNOSIS — D84.9 IMMUNOSUPPRESSION: Primary | ICD-10-CM

## 2020-04-06 DIAGNOSIS — Z94.4 LIVER TRANSPLANTED: ICD-10-CM

## 2020-04-06 PROCEDURE — 99213 OFFICE O/P EST LOW 20 MIN: CPT | Mod: 95,,, | Performed by: INTERNAL MEDICINE

## 2020-04-06 PROCEDURE — 99213 PR OFFICE/OUTPT VISIT, EST, LEVL III, 20-29 MIN: ICD-10-PCS | Mod: 95,,, | Performed by: INTERNAL MEDICINE

## 2020-04-06 NOTE — TELEPHONE ENCOUNTER
Spoke with patient and made her aware that she will have to wait until 15 minutes prior to her scheduled appointment time to check-in. Patient verbalized understanding.

## 2020-04-06 NOTE — TELEPHONE ENCOUNTER
----- Message from Ila Castro sent at 4/6/2020 10:53 AM CDT -----  Contact: PATIENT  CALLING CONCERNING HER APPOINTMENT. STATES SHE CHECKED IN AND IT IS NOT SHOWING CHECKED. PLEASE CALL PATIENT ASAP [phone]. THANKS.

## 2020-04-06 NOTE — PROGRESS NOTES
Transplant Hepatology  Liver Transplant Recipient Follow-up    Transplant Date: 12/26/2017  UNOS Native Liver Dx: Alcoholic Cirrhosis    Marcie is here for follow up of her liver transplant.    ORGAN: LIVER  Whole or Partial: whole liver  Donor Type: donation after brain death  CDC High Risk: no  Donor CMV Status: Negative  Donor HCV Status: Negative  Donor HBcAb: Negative  Biliary Anastomosis: end to end  Arterial Anatomy: standard  IVC reconstruction: cavaplasty piggyback  Portal vein status: patent  .    Subjective:     Interval History:  Currently, she is doing with difficulty. Current complaints include chronic issues of shortness of breath, lightheadedness, overall not feeling well.  This been going on for a while.  See other clinic note which she has already had evaluation with Cardiology that has been fairly unremarkable.  She is also in the process of getting a workup with Hematology-Oncology.  She is scheduled to follow-up with them later this month.  She has been seen the emergency room multiple times without any clear diagnosis.  Lives like her chest imaging has been unremarkable.  As relates to her liver her liver tests have been normal.  There been no issues as relates to her transplant.  She reports issues with having with diarrhea is improving.  She reports her stools are becoming more formed.    Review of Systems    Objective:     Physical Exam    WBC   Date Value Ref Range Status   03/26/2020 11.37 3.90 - 12.70 K/uL Final     Hemoglobin   Date Value Ref Range Status   03/26/2020 11.8 (L) 12.0 - 16.0 g/dL Final     POC Hematocrit   Date Value Ref Range Status   12/26/2017 28 (L) 36 - 54 %PCV Final     Hematocrit   Date Value Ref Range Status   03/26/2020 38.8 37.0 - 48.5 % Final     Platelets   Date Value Ref Range Status   03/26/2020 225 150 - 350 K/uL Final     BUN, Bld   Date Value Ref Range Status   03/26/2020 4 (L) 6 - 20 mg/dL Final     Creatinine   Date Value Ref Range Status   03/26/2020 0.8  0.5 - 1.4 mg/dL Final     Glucose   Date Value Ref Range Status   03/26/2020 95 70 - 110 mg/dL Final     Calcium   Date Value Ref Range Status   03/26/2020 9.6 8.7 - 10.5 mg/dL Final     Sodium   Date Value Ref Range Status   03/26/2020 139 136 - 145 mmol/L Final     Potassium   Date Value Ref Range Status   03/26/2020 4.0 3.5 - 5.1 mmol/L Final     Chloride   Date Value Ref Range Status   03/26/2020 105 95 - 110 mmol/L Final     Magnesium   Date Value Ref Range Status   02/27/2020 1.5 (L) 1.6 - 2.6 mg/dL Final     AST   Date Value Ref Range Status   03/26/2020 12 10 - 40 U/L Final     ALT   Date Value Ref Range Status   03/26/2020 8 (L) 10 - 44 U/L Final     Alkaline Phosphatase   Date Value Ref Range Status   03/26/2020 171 (H) 55 - 135 U/L Final     Total Bilirubin   Date Value Ref Range Status   03/26/2020 0.4 0.1 - 1.0 mg/dL Final     Comment:     For infants and newborns, interpretation of results should be based  on gestational age, weight and in agreement with clinical  observations.  Premature Infant recommended reference ranges:  Up to 24 hours.............<8.0 mg/dL  Up to 48 hours............<12.0 mg/dL  3-5 days..................<15.0 mg/dL  6-29 days.................<15.0 mg/dL       Albumin   Date Value Ref Range Status   03/26/2020 3.7 3.5 - 5.2 g/dL Final     INR   Date Value Ref Range Status   02/27/2020 1.0 0.8 - 1.2 Final     Comment:     Coumadin Therapy:  2.0 - 3.0 for INR for all indicators except mechanical heart valves  and antiphospholipid syndromes which should use 2.5 - 3.5.       Lab Results   Component Value Date    TACROLIMUS 5.7 02/17/2020           Assessment/Plan:     1. Immunosuppression    2. Liver transplanted    3. SOB (shortness of breath)    4. Diarrhea, unspecified type      Immunosuppression-given liver tests have been stable and patient is so far out from transplant will discontinue CellCept.  Uncertain if this could be related to some of her other symptoms.  -discontinue  CellCept  -repeat labs already scheduled for next week    Liver transplanted-good allograft function, low concern that which she is experiencing is related to the transplant    SOB (shortness of breath)-unclear etiology, seems she is getting a very comprehensive evaluation.  Uncertain if there could be a component of anxiety?  -will send my note to the PCP to see if she has any recommendations for will could be anxiety related symptoms  -also explained to patient that if workup with Oncology is unremarkable she could consider pulmonary evaluation    Diarrhea, unspecified type-seems to be improving  -continue to monitor for now      Return to clinic in 6 months    Patient was seen in the liver transplant department at [facility].      The patient location is: [place]  The chief complaint leading to consultation is: Liver transplant follow up  Visit type: Virtual visit with synchronous audio and video  Total time spent with patient: 25 min  Each patient to whom he or she provides medical services by telemedicine is:  (1) informed of the relationship between the physician and patient and the respective role of any other health care provider with respect to management of the patient; and (2) notified that he or she may decline to receive medical services by telemedicine and may withdraw from such care at any time.        Joselin Souza MD           [facility] Patient Status  Functional Status: 50% - Requires considerable assistance and frequent medical care  Physical Capacity: Limited Mobility

## 2020-04-07 RX ORDER — LEVOCETIRIZINE DIHYDROCHLORIDE 5 MG/1
TABLET, FILM COATED ORAL
Qty: 30 TABLET | Refills: 0 | Status: SHIPPED | OUTPATIENT
Start: 2020-04-07 | End: 2021-01-27

## 2020-04-08 RX ORDER — FAMOTIDINE 20 MG/1
TABLET, FILM COATED ORAL
Qty: 60 TABLET | Refills: 5 | Status: SHIPPED | OUTPATIENT
Start: 2020-04-08 | End: 2020-11-03 | Stop reason: SDUPTHER

## 2020-04-09 ENCOUNTER — TELEPHONE (OUTPATIENT)
Dept: PAIN MEDICINE | Facility: CLINIC | Age: 55
End: 2020-04-09

## 2020-04-09 RX ORDER — GABAPENTIN 600 MG/1
TABLET ORAL
Qty: 90 TABLET | Refills: 3 | Status: SHIPPED | OUTPATIENT
Start: 2020-04-09 | End: 2020-04-09

## 2020-04-09 NOTE — TELEPHONE ENCOUNTER
Spoke with rubina from pharmacy and she stated that pt was getting gabapentin from 2 providers for a while and wants to know who will takeover medication primarily ? Spoke with  and he stated he would get with  and let  take over mediation and discontinue mediation from him. Informed pharmacy of decision. All questions answered.

## 2020-04-09 NOTE — TELEPHONE ENCOUNTER
----- Message from Char Wakefield sent at 4/9/2020 12:50 PM CDT -----  Contact: [facility] [phone]  .Type:  Pharmacy Calling to Clarify an RX    Name of Caller Belinda  Pharmacy Name:[facility]  Prescription Name:Gabapentin  What do they need to clarify?:dosage  Best Call Back Number:[phone]  Additional Information:

## 2020-04-13 ENCOUNTER — LAB VISIT (OUTPATIENT)
Dept: LAB | Facility: HOSPITAL | Age: 55
End: 2020-04-13
Attending: INTERNAL MEDICINE
Payer: MEDICAID

## 2020-04-13 DIAGNOSIS — E83.42 HYPOMAGNESEMIA: ICD-10-CM

## 2020-04-13 DIAGNOSIS — Z94.4 LIVER REPLACED BY TRANSPLANT: ICD-10-CM

## 2020-04-13 LAB
ALBUMIN SERPL BCP-MCNC: 3.4 G/DL (ref 3.5–5.2)
ALP SERPL-CCNC: 164 U/L (ref 55–135)
ALT SERPL W/O P-5'-P-CCNC: 9 U/L (ref 10–44)
ANION GAP SERPL CALC-SCNC: 8 MMOL/L (ref 8–16)
AST SERPL-CCNC: 9 U/L (ref 10–40)
BASOPHILS # BLD AUTO: 0.05 K/UL (ref 0–0.2)
BASOPHILS NFR BLD: 0.5 % (ref 0–1.9)
BILIRUB SERPL-MCNC: 0.5 MG/DL (ref 0.1–1)
BUN SERPL-MCNC: 8 MG/DL (ref 6–20)
CALCIUM SERPL-MCNC: 9.3 MG/DL (ref 8.7–10.5)
CHLORIDE SERPL-SCNC: 107 MMOL/L (ref 95–110)
CO2 SERPL-SCNC: 27 MMOL/L (ref 23–29)
CREAT SERPL-MCNC: 0.8 MG/DL (ref 0.5–1.4)
DIFFERENTIAL METHOD: ABNORMAL
EOSINOPHIL # BLD AUTO: 0.1 K/UL (ref 0–0.5)
EOSINOPHIL NFR BLD: 1.1 % (ref 0–8)
ERYTHROCYTE [DISTWIDTH] IN BLOOD BY AUTOMATED COUNT: 14.8 % (ref 11.5–14.5)
EST. GFR  (AFRICAN AMERICAN): >60 ML/MIN/1.73 M^2
EST. GFR  (NON AFRICAN AMERICAN): >60 ML/MIN/1.73 M^2
GLUCOSE SERPL-MCNC: 97 MG/DL (ref 70–110)
HCT VFR BLD AUTO: 38.6 % (ref 37–48.5)
HGB BLD-MCNC: 11.4 G/DL (ref 12–16)
IMM GRANULOCYTES # BLD AUTO: 0.03 K/UL (ref 0–0.04)
IMM GRANULOCYTES NFR BLD AUTO: 0.3 % (ref 0–0.5)
LYMPHOCYTES # BLD AUTO: 2.2 K/UL (ref 1–4.8)
LYMPHOCYTES NFR BLD: 21.8 % (ref 18–48)
MAGNESIUM SERPL-MCNC: 1.5 MG/DL (ref 1.6–2.6)
MCH RBC QN AUTO: 24.8 PG (ref 27–31)
MCHC RBC AUTO-ENTMCNC: 29.5 G/DL (ref 32–36)
MCV RBC AUTO: 84 FL (ref 82–98)
MONOCYTES # BLD AUTO: 0.7 K/UL (ref 0.3–1)
MONOCYTES NFR BLD: 6.9 % (ref 4–15)
NEUTROPHILS # BLD AUTO: 7.1 K/UL (ref 1.8–7.7)
NEUTROPHILS NFR BLD: 69.4 % (ref 38–73)
NRBC BLD-RTO: 0 /100 WBC
PLATELET # BLD AUTO: 165 K/UL (ref 150–350)
PMV BLD AUTO: 10.8 FL (ref 9.2–12.9)
POTASSIUM SERPL-SCNC: 4.3 MMOL/L (ref 3.5–5.1)
PROT SERPL-MCNC: 7 G/DL (ref 6–8.4)
RBC # BLD AUTO: 4.6 M/UL (ref 4–5.4)
SODIUM SERPL-SCNC: 142 MMOL/L (ref 136–145)
WBC # BLD AUTO: 10.18 K/UL (ref 3.9–12.7)

## 2020-04-13 PROCEDURE — 83735 ASSAY OF MAGNESIUM: CPT

## 2020-04-13 PROCEDURE — 80197 ASSAY OF TACROLIMUS: CPT

## 2020-04-13 PROCEDURE — 36415 COLL VENOUS BLD VENIPUNCTURE: CPT

## 2020-04-13 PROCEDURE — 85025 COMPLETE CBC W/AUTO DIFF WBC: CPT

## 2020-04-13 PROCEDURE — 80053 COMPREHEN METABOLIC PANEL: CPT

## 2020-04-14 LAB — TACROLIMUS BLD-MCNC: 8.3 NG/ML (ref 5–15)

## 2020-04-14 NOTE — ASSESSMENT & PLAN NOTE
Reviewed PET-CT scan that showed no FDG avid lymph nodes.  LDH and ESR were within normal ranges.  At this time no suspicion for lymphoma.  Likely patient has some endocrinopathies given abnormal TSH and thyroid nodules.  Referral to endocrinologist placed.  Patient to see us back in 6 months or sooner as needed.

## 2020-04-14 NOTE — ASSESSMENT & PLAN NOTE
Recent PET scan showed multi nodular thyroid.  Recent TSH was also noted to be low at 0.13.  Given her symptomatology, will recommend follow-up with endocrinology for possible ultrasound of thyroid.  Multiple ultrasound of the thyroid in the past in 2014 and 2015 where essentially the same with biopsy in 2014 showing no malignancy.

## 2020-04-14 NOTE — PROGRESS NOTES
Subjective:   Date of Visit: 4/16/20   ?   ?    REFERRING PROVIDER: No referring provider defined for this encounter.   ?   CHIEF COMPLAINT:  Unintentional weight loss and night sweats. ???????       HPI:  54-year-old female with history of arthritis, alcohol abuse with end-stage liver disease, hypertension, hyperlipidemia, questionable liver cancer status post liver transplant in 2017, presents to us as a referral due to recent unintentional weight loss with associated abdominal pain and diarrhea as well as shortness of breath.    CT scan of the abdomen and pelvis showed atherosclerotic calcification of the abdominal aorta without aneurysmal dilatation, no acute abdominal abnormality.    Interval history:  Patient was seen by myself on 03/26/2020 for complains of B symptoms including unintentional weight loss, night sweats and low-grade fever.  PET scan was ordered at that time.  She presents today to discuss results of the PET-CT scan.    Still complains of a B symptoms although significantly improved compared to the last time she was seen in the office.  Denies chest pain,nausea or vomiting, chills, dysuria.  She also denies hemoptysis, hematemesis, hematochezia, melena or hematuria.    Patient has a history of alcohol abuse.  But denies any recent alcohol use.  She also denies use of illicit drugs.  No family history for cancer.    Review of Systems   Constitutional: Positive for fatigue. Negative for activity change, appetite change, chills and fever.   HENT: Negative for hearing loss, mouth sores, nosebleeds, sore throat, tinnitus, trouble swallowing and voice change.    Eyes: Negative for visual disturbance.   Respiratory: Negative for cough and chest tightness.    Cardiovascular: Positive for palpitations. Negative for chest pain and leg swelling.   Gastrointestinal: Negative for abdominal pain, anal bleeding, blood in stool, constipation, nausea and vomiting.   Genitourinary: Negative for dysuria,  frequency, hematuria, pelvic pain, vaginal bleeding and vaginal pain.   Musculoskeletal: Negative for arthralgias, back pain, joint swelling and neck pain.   Skin: Negative for color change, pallor, rash and wound.   Allergic/Immunologic: Negative for immunocompromised state.   Neurological: Negative for dizziness, tremors, syncope, speech difficulty, light-headedness and headaches.        Night sweats   Hematological: Negative for adenopathy. Does not bruise/bleed easily.   Psychiatric/Behavioral: Negative for agitation, confusion, decreased concentration, hallucinations and sleep disturbance. The patient is not nervous/anxious.        ?   PAST MEDICAL HISTORY:   Past Medical History:   Diagnosis Date    Alcohol abuse     Alcoholic hepatitis     Anemia of chronic disease     Arthritis     generialized    Cancer 12/26/2017    liver    Coagulopathy     Dyspnea on exertion 3/26/2020    Encounter for blood transfusion     End stage liver disease     History of alcohol abuse     Hyperlipidemia     Hypertension     Hyponatremia     Liver cirrhosis     Liver transplant candidate 12/26/2017    Obesity (BMI 30-39.9) 1/5/2016    ?     PAST SURGICAL HISTORY:   Past Surgical History:   Procedure Laterality Date    BREAST BIOPSY Left     benign    CHOLECYSTECTOMY      COLONOSCOPY N/A 12/1/2017    Procedure: COLONOSCOPY;  Surgeon: Filemon Fuentes MD;  Location: [address]);  Service: Endoscopy;  Laterality: N/A;    COLONOSCOPY N/A 12/5/2017    Procedure: COLONOSCOPY;  Surgeon: José Chavira MD;  Location: [address]);  Service: Endoscopy;  Laterality: N/A;    EXAMINATION UNDER ANESTHESIA N/A 2/18/2020    Procedure: EXAM UNDER ANESTHESIA;  Surgeon: Alden Horowitz MD;  Location: [facility] OR;  Service: General;  Laterality: N/A;    EXCISIONAL HEMORRHOIDECTOMY N/A 2/18/2020    Procedure: HEMORRHOIDECTOMY;  Surgeon: Alden Horowitz MD;  Location: [facility] OR;  Service: General;  Laterality:  N/A;    HYSTERECTOMY      complete     INJECTION OF ANESTHETIC AGENT AROUND PUDENDAL NERVE N/A 2/18/2020    Procedure: BLOCK, NERVE, PUDENDAL;  Surgeon: Alden Horowitz MD;  Location: [facility] OR;  Service: General;  Laterality: N/A;    INJECTION OF ANESTHETIC AGENT INTO SACROILIAC JOINT Right 6/14/2019    Procedure: right Sacroiliac Joint Injection;  Surgeon: David Desai MD;  Location: [facility];  Service: Pain Management;  Laterality: Right;    INJECTION OF ANESTHETIC AGENT INTO SACROILIAC JOINT Bilateral 2/7/2020    Procedure: Bilateral GT bursa + Bilateral Sacroiliac Joint Injection;  Surgeon: David Desai MD;  Location: [facility];  Service: Pain Management;  Laterality: Bilateral;    INJECTION OF JOINT Bilateral 2/7/2020    Procedure: Bilateral GT bursa + Bilateral Sacroiliac Joint Injection;  Surgeon: David Desai MD;  Location: [facility];  Service: Pain Management;  Laterality: Bilateral;    INJECTION OF PIRIFORMIS MUSCLE Right 6/14/2019    Procedure: Right piriformis injection;  Surgeon: David Desai MD;  Location: [facility];  Service: Pain Management;  Laterality: Right;    LIVER TRANSPLANT  12/2017      ?   ALLERGIES:   Allergies as of 04/16/2020 - Reviewed 04/16/2020   Allergen Reaction Noted    Ferrous sulfate Other (See Comments) 01/10/2017      ?   MEDICATIONS:?   Outpatient Medications Marked as Taking for the 4/16/20 encounter (Office Visit) with Baldo Kim MD   Medication Sig Dispense Refill    atorvastatin (LIPITOR) 40 MG tablet Take 1 tablet (40 mg total) by mouth every evening. 90 tablet 3    azelastine (ASTELIN) 137 mcg (0.1 %) nasal spray 2 sprays (274 mcg total) by Nasal route 2 (two) times daily as needed for Rhinitis. 30 mL 5    BIOTIN ORAL Take by mouth every evening.       cyclobenzaprine (FLEXERIL) 10 MG tablet TAKE 1/2 TO 1 TABLET BY MOUTH EVERY NIGHT AT BEDTIME AS NEEDED FOR MUSCLE SPASMS. MAY CAUSE DROWSINESS 30 tablet 0     diclofenac sodium (VOLTAREN) 1 % Gel Apply 2 g topically 3 (three) times daily as needed. 2 Tube 6    ergocalciferol (ERGOCALCIFEROL) 50,000 unit Cap TAKE 1 CAPSULE BY MOUTH EVERY 7 DAYS 12 capsule 0    famotidine (PEPCID) 20 MG tablet TAKE 1 TABLET(20 MG) BY MOUTH TWICE DAILY 60 tablet 5    furosemide (LASIX) 40 MG tablet TAKE 1 TABLET BY MOUTH DAILY. 30 tablet 0    gabapentin (NEURONTIN) 300 MG capsule TAKE 2 CAPSULES(600 MG) BY MOUTH TWICE DAILY (Patient taking differently: 900 mg. ) 120 capsule 2    levocetirizine (XYZAL) 5 MG tablet TAKE 1 TABLET(5 MG) BY MOUTH EVERY EVENING 30 tablet 0    magnesium oxide (MAG-OX) 400 mg (241.3 mg magnesium) tablet Take 2 tablets (800 mg total) by mouth 2 (two) times daily. 120 tablet 6    methocarbamol (ROBAXIN) 750 MG Tab TAKE 1 TABLET BY MOUTH TWICE DAILY AS NEEDED FOR MUSCLE SPASMS. 60 tablet 0    mycophenolate (CELLCEPT) 250 mg Cap       NIFEdipine (PROCARDIA-XL) 30 MG (OSM) 24 hr tablet Take 1 tablet (30 mg total) by mouth once daily. 90 tablet 3    ondansetron (ZOFRAN-ODT) 4 MG TbDL DISSOLVE 1 TO 2 TABLETS ON THE TONGUE THREE TIMES DAILY AS NEEDED FOR NAUSEA 20 tablet 0    oxyCODONE-acetaminophen (PERCOCET) 5-325 mg per tablet Take 1 tablet by mouth every 4 (four) hours as needed for Pain. 25 tablet 0    potassium chloride (KLOR-CON) 10 MEQ TbSR TAKE 2 TABLETS(20 MEQ) BY MOUTH EVERY DAY 60 tablet 2    senna-docusate 8.6-50 mg (PERICOLACE) 8.6-50 mg per tablet Take 1 tablet by mouth 2 (two) times daily. Take while taking narcotic pain medications 28 tablet 0    tacrolimus (PROGRAF) 1 MG Cap Take 2 capsules (2 mg total) by mouth every 12 (twelve) hours. 120 capsule 11      ?   SOCIAL HISTORY:?   Social History     Tobacco Use    Smoking status: Never Smoker    Smokeless tobacco: Never Used   Substance Use Topics    Alcohol use: No     Frequency: Never     Comment: Currently admitted to inpatient rehab 7/2017        ?   FAMILY HISTORY:   family history  includes Alzheimer's disease in her mother; Breast cancer in her paternal aunt; Depression in her maternal grandfather; Diabetes in her father and sister; Hypertension in her father and mother.   ?     Objective:      Physical Exam   Constitutional: She is oriented to person, place, and time. She appears well-developed and well-nourished. She is cooperative.  Non-toxic appearance. She appears distressed.   HENT:   Head: Normocephalic and atraumatic.   Mouth/Throat: No oropharyngeal exudate.   Eyes: Pupils are equal, round, and reactive to light. Conjunctivae are normal. Right eye exhibits no discharge. Left eye exhibits no discharge. No scleral icterus.   Neck: Normal range of motion. Neck supple. No thyromegaly present.   Cardiovascular: Normal rate and regular rhythm.   No murmur heard.  Pulmonary/Chest: Effort normal and breath sounds normal. No respiratory distress. She exhibits no tenderness.   Abdominal: Soft. Bowel sounds are normal. She exhibits no distension and no mass. There is no tenderness. There is no rebound and no guarding.   Musculoskeletal: Normal range of motion. She exhibits no edema or tenderness.   Lymphadenopathy:     She has no cervical adenopathy.        Right cervical: No superficial cervical adenopathy present.       Left cervical: No superficial cervical adenopathy present.        Right axillary: No pectoral adenopathy present.        Left axillary: No pectoral adenopathy present.       Right: No inguinal and no supraclavicular adenopathy present.        Left: No inguinal and no supraclavicular adenopathy present.   Neurological: She is alert and oriented to person, place, and time. No cranial nerve deficit or sensory deficit.   Skin: Skin is warm and dry. Capillary refill takes 2 to 3 seconds. No rash noted. No erythema. No pallor.   Psychiatric: She has a normal mood and affect. Her behavior is normal. Judgment normal.       ?   Vitals:    04/16/20 0813   BP: 132/84   Pulse: 89   Resp:  14   Temp: 98.1 °F (36.7 °C)      ?     ECOG SCORE    1 - Restricted in strenuous activity-ambulatory and able to carry out work of a light nature       ?   Laboratory:  ?   No visits with results within 1 Day(s) from this visit.   Latest known visit with results is:   Lab Visit on 04/13/2020   Component Date Value Ref Range Status    WBC 04/13/2020 10.18  3.90 - 12.70 K/uL Final    RBC 04/13/2020 4.60  4.00 - 5.40 M/uL Final    Hemoglobin 04/13/2020 11.4* 12.0 - 16.0 g/dL Final    Hematocrit 04/13/2020 38.6  37.0 - 48.5 % Final    Mean Corpuscular Volume 04/13/2020 84  82 - 98 fL Final    Mean Corpuscular Hemoglobin 04/13/2020 24.8* 27.0 - 31.0 pg Final    Mean Corpuscular Hemoglobin Conc 04/13/2020 29.5* 32.0 - 36.0 g/dL Final    RDW 04/13/2020 14.8* 11.5 - 14.5 % Final    Platelets 04/13/2020 165  150 - 350 K/uL Final    MPV 04/13/2020 10.8  9.2 - 12.9 fL Final    Immature Granulocytes 04/13/2020 0.3  0.0 - 0.5 % Final    Gran # (ANC) 04/13/2020 7.1  1.8 - 7.7 K/uL Final    Immature Grans (Abs) 04/13/2020 0.03  0.00 - 0.04 K/uL Final    Lymph # 04/13/2020 2.2  1.0 - 4.8 K/uL Final    Mono # 04/13/2020 0.7  0.3 - 1.0 K/uL Final    Eos # 04/13/2020 0.1  0.0 - 0.5 K/uL Final    Baso # 04/13/2020 0.05  0.00 - 0.20 K/uL Final    nRBC 04/13/2020 0  0 /100 WBC Final    Gran% 04/13/2020 69.4  38.0 - 73.0 % Final    Lymph% 04/13/2020 21.8  18.0 - 48.0 % Final    Mono% 04/13/2020 6.9  4.0 - 15.0 % Final    Eosinophil% 04/13/2020 1.1  0.0 - 8.0 % Final    Basophil% 04/13/2020 0.5  0.0 - 1.9 % Final    Differential Method 04/13/2020 Automated   Final    Sodium 04/13/2020 142  136 - 145 mmol/L Final    Potassium 04/13/2020 4.3  3.5 - 5.1 mmol/L Final    Chloride 04/13/2020 107  95 - 110 mmol/L Final    CO2 04/13/2020 27  23 - 29 mmol/L Final    Glucose 04/13/2020 97  70 - 110 mg/dL Final    BUN, Bld 04/13/2020 8  6 - 20 mg/dL Final    Creatinine 04/13/2020 0.8  0.5 - 1.4 mg/dL Final    Calcium  04/13/2020 9.3  8.7 - 10.5 mg/dL Final    Total Protein 04/13/2020 7.0  6.0 - 8.4 g/dL Final    Albumin 04/13/2020 3.4* 3.5 - 5.2 g/dL Final    Total Bilirubin 04/13/2020 0.5  0.1 - 1.0 mg/dL Final    Alkaline Phosphatase 04/13/2020 164* 55 - 135 U/L Final    AST 04/13/2020 9* 10 - 40 U/L Final    ALT 04/13/2020 9* 10 - 44 U/L Final    Anion Gap 04/13/2020 8  8 - 16 mmol/L Final    eGFR if African American 04/13/2020 >60.0  >60 mL/min/1.73 m^2 Final    eGFR if non African American 04/13/2020 >60.0  >60 mL/min/1.73 m^2 Final    Tacrolimus Lvl 04/13/2020 8.3  5.0 - 15.0 ng/mL Final    Magnesium 04/13/2020 1.5* 1.6 - 2.6 mg/dL Final      ?   Tumor markers   ?   ?   Imaging: NM PET CT Routine FDG  Narrative: EXAMINATION:  NM PET CT ROUTINE    CLINICAL HISTORY:  unintentional weight loss; Abnormal weight loss    TECHNIQUE:  12.46 mCi of F18-FDG was administered intravenously in the left antecubital fossa.  After an approximately 60 min distribution time, PET/CT images were acquired from the skull base to the mid thigh. Transmission images were acquired to correct for attenuation using a whole body low-dose CT scan without contrast with the arms positioned above the head.    COMPARISON:  CT dated 02/28/2020    FINDINGS:  Quality of the study: Adequate.    Head neck: No abnormal foci of increased tracer uptake are present.    Chest: No abnormal foci of increased tracer uptake are present.    Abdomen and pelvis: No abnormal foci of increased tracer uptake are present.    Skeletal: No abnormal foci of increased tracer uptake are present.    Physiologic uptake of the tracer is present within the brain, salivary glands, myocardium, GI and  tracts.    Incidental CT findings: Multinodular appearance of the thyroid noted.  No associated abnormal FDG uptake.  Impression: No suspicious foci of increased FDG uptake.    Multinodular appearance of the thyroid noted.  Further evaluation could be obtained with thyroid  ultrasound.    Electronically signed by: Alden Aldana MD  Date:    04/03/2020  Time:    10:23     ?      Pathology:  Pathology Results  (Last 10 years)               02/18/20 0828  Specimen to Pathology, Surgery General Surgery Final result    Narrative:  Pre-op Diagnosis: Grade IV hemorrhoids [K64.3]   Rectal bleed [K62.5]   Procedure(s):   HEMORRHOIDECTOMY   BLOCK, NERVE, PUDENDAL   Number of specimens: 1   Name of specimens:   1. Right posterior hemorrhoid lesion - PERM   Specimen total (fresh, frozen, permanent):->1              ?   Assessment/Plan:       1. Multinodular thyroid    2. Essential hypertension    3. Unintentional weight loss of 10% body weight within 6 months    4. Long-term use of immunosuppressant medication          ?Multinodular thyroid  Recent PET scan showed multi nodular thyroid.  Recent TSH was also noted to be low at 0.13.  Given her symptomatology, will recommend follow-up with endocrinology for possible ultrasound of thyroid.  Multiple ultrasound of the thyroid in the past in 2014 and 2015 where essentially the same with biopsy in 2014 showing no malignancy.    Essential hypertension  Management per PCP.    Unintentional weight loss of 10% body weight within 6 months  Reviewed PET-CT scan that showed no FDG avid lymph nodes.  LDH and ESR were within normal ranges.  At this time no suspicion for lymphoma.  Likely patient has some endocrinopathies given abnormal TSH and thyroid nodules.  Referral to endocrinologist placed.  Patient to see us back in 6 months or sooner as needed.    Long-term use of immunosuppressant medication  History of liver cirrhosis status post liver transplant.  On immunosuppressants and follows with transplant team.  Noted elevated in alkaline phosphatase likely secondary to above.  Will continue to monitor.     ?   ?   Follow-Up: Follow up in about 6 months (around 10/16/2020).    ALEX GUADALUPE Md., Ph.D  Hematology & Oncology Department  Phone #:  [phone]

## 2020-04-16 ENCOUNTER — OFFICE VISIT (OUTPATIENT)
Dept: HEMATOLOGY/ONCOLOGY | Facility: CLINIC | Age: 55
End: 2020-04-16
Payer: MEDICAID

## 2020-04-16 VITALS
BODY MASS INDEX: 30.52 KG/M2 | DIASTOLIC BLOOD PRESSURE: 84 MMHG | RESPIRATION RATE: 14 BRPM | HEIGHT: 65 IN | SYSTOLIC BLOOD PRESSURE: 132 MMHG | TEMPERATURE: 98 F | OXYGEN SATURATION: 100 % | WEIGHT: 183.19 LBS | HEART RATE: 89 BPM

## 2020-04-16 DIAGNOSIS — E04.2 MULTINODULAR THYROID: ICD-10-CM

## 2020-04-16 DIAGNOSIS — R60.0 BILATERAL LEG EDEMA: ICD-10-CM

## 2020-04-16 DIAGNOSIS — R63.4 UNINTENTIONAL WEIGHT LOSS OF 10% BODY WEIGHT WITHIN 6 MONTHS: ICD-10-CM

## 2020-04-16 DIAGNOSIS — I10 ESSENTIAL HYPERTENSION: Chronic | ICD-10-CM

## 2020-04-16 DIAGNOSIS — Z29.89 PROPHYLACTIC IMMUNOTHERAPY: ICD-10-CM

## 2020-04-16 DIAGNOSIS — Z79.60 LONG-TERM USE OF IMMUNOSUPPRESSANT MEDICATION: ICD-10-CM

## 2020-04-16 PROCEDURE — 99214 OFFICE O/P EST MOD 30 MIN: CPT | Mod: PBBFAC | Performed by: INTERNAL MEDICINE

## 2020-04-16 PROCEDURE — 99214 OFFICE O/P EST MOD 30 MIN: CPT | Mod: S$PBB,,, | Performed by: INTERNAL MEDICINE

## 2020-04-16 PROCEDURE — 99999 PR PBB SHADOW E&M-EST. PATIENT-LVL IV: CPT | Mod: PBBFAC,,, | Performed by: INTERNAL MEDICINE

## 2020-04-16 PROCEDURE — 99999 PR PBB SHADOW E&M-EST. PATIENT-LVL IV: ICD-10-PCS | Mod: PBBFAC,,, | Performed by: INTERNAL MEDICINE

## 2020-04-16 PROCEDURE — 99214 PR OFFICE/OUTPT VISIT, EST, LEVL IV, 30-39 MIN: ICD-10-PCS | Mod: S$PBB,,, | Performed by: INTERNAL MEDICINE

## 2020-04-16 RX ORDER — MYCOPHENOLATE MOFETIL 250 MG/1
CAPSULE ORAL
Status: ON HOLD | COMMUNITY
Start: 2020-04-07 | End: 2020-10-21 | Stop reason: CLARIF

## 2020-04-16 RX ORDER — FUROSEMIDE 40 MG/1
TABLET ORAL
Qty: 30 TABLET | Refills: 0 | Status: SHIPPED | OUTPATIENT
Start: 2020-04-16 | End: 2020-05-18

## 2020-04-16 RX ORDER — CYCLOBENZAPRINE HCL 10 MG
TABLET ORAL
Qty: 30 TABLET | Refills: 0 | Status: SHIPPED | OUTPATIENT
Start: 2020-04-16 | End: 2020-05-12

## 2020-04-16 NOTE — ASSESSMENT & PLAN NOTE
History of liver cirrhosis status post liver transplant.  On immunosuppressants and follows with transplant team.  Noted elevated in alkaline phosphatase likely secondary to above.  Will continue to monitor.

## 2020-04-17 ENCOUNTER — TELEPHONE (OUTPATIENT)
Dept: TRANSPLANT | Facility: CLINIC | Age: 55
End: 2020-04-17

## 2020-04-17 NOTE — TELEPHONE ENCOUNTER
----- Message from Joselin Souza MD sent at 4/16/2020  4:42 PM CDT -----  Reviewed, nothing to do; repeat per routine

## 2020-04-17 NOTE — TELEPHONE ENCOUNTER
Dr. Souza reviewed your labs.  Sent patient message to let her know labs are stable, no changes.  Next labs due on 06/08/20

## 2020-04-27 ENCOUNTER — LAB VISIT (OUTPATIENT)
Dept: LAB | Facility: HOSPITAL | Age: 55
End: 2020-04-27
Attending: PHYSICIAN ASSISTANT
Payer: MEDICAID

## 2020-04-27 DIAGNOSIS — E78.2 MIXED HYPERLIPIDEMIA: ICD-10-CM

## 2020-04-27 LAB
CHOLEST SERPL-MCNC: 129 MG/DL (ref 120–199)
CHOLEST/HDLC SERPL: 3.8 {RATIO} (ref 2–5)
HDLC SERPL-MCNC: 34 MG/DL (ref 40–75)
HDLC SERPL: 26.4 % (ref 20–50)
LDLC SERPL CALC-MCNC: 69.4 MG/DL (ref 63–159)
NONHDLC SERPL-MCNC: 95 MG/DL
TRIGL SERPL-MCNC: 128 MG/DL (ref 30–150)

## 2020-04-27 PROCEDURE — 80061 LIPID PANEL: CPT

## 2020-04-27 PROCEDURE — 36415 COLL VENOUS BLD VENIPUNCTURE: CPT

## 2020-04-28 ENCOUNTER — PATIENT MESSAGE (OUTPATIENT)
Dept: INTERNAL MEDICINE | Facility: CLINIC | Age: 55
End: 2020-04-28

## 2020-04-28 NOTE — TELEPHONE ENCOUNTER
[facility] w/ reported s/s, pt requesting COVID-19 testing after exp to COVID+ person. Please advise?

## 2020-04-29 DIAGNOSIS — E55.9 VITAMIN D DEFICIENCY: ICD-10-CM

## 2020-04-29 DIAGNOSIS — E83.42 HYPOMAGNESEMIA: ICD-10-CM

## 2020-04-30 RX ORDER — LANOLIN ALCOHOL/MO/W.PET/CERES
CREAM (GRAM) TOPICAL
Qty: 270 TABLET | Refills: 2 | Status: SHIPPED | OUTPATIENT
Start: 2020-04-30 | End: 2021-04-06 | Stop reason: SDUPTHER

## 2020-04-30 RX ORDER — ERGOCALCIFEROL 1.25 MG/1
CAPSULE ORAL
Qty: 12 CAPSULE | Refills: 0 | Status: SHIPPED | OUTPATIENT
Start: 2020-04-30 | End: 2020-07-27 | Stop reason: SDUPTHER

## 2020-05-05 ENCOUNTER — OFFICE VISIT (OUTPATIENT)
Dept: INTERNAL MEDICINE | Facility: CLINIC | Age: 55
End: 2020-05-05
Payer: MEDICAID

## 2020-05-05 DIAGNOSIS — R06.02 SHORTNESS OF BREATH: ICD-10-CM

## 2020-05-05 DIAGNOSIS — E04.2 MULTINODULAR GOITER: Primary | ICD-10-CM

## 2020-05-05 PROCEDURE — 99213 OFFICE O/P EST LOW 20 MIN: CPT | Mod: 95,,, | Performed by: PHYSICIAN ASSISTANT

## 2020-05-05 PROCEDURE — 99213 PR OFFICE/OUTPT VISIT, EST, LEVL III, 20-29 MIN: ICD-10-PCS | Mod: 95,,, | Performed by: PHYSICIAN ASSISTANT

## 2020-05-05 NOTE — PATIENT INSTRUCTIONS
Instructions for Patients with Confirmed or Suspected COVID-19    If you are awaiting your test result, you will either be called or it will be released to the patient portal.  If you have any questions about your test, please visit [email or web address] or call our COVID-19 information line at [phone].       Stay home and stay away from family members and friends. The CDC says, you can leave home after these three things have happened: 1) You have had no fever for at least 72 hours (that is three full days of no fever without the use of medicine that reduces fevers) 2) AND other symptoms have improved (for example, when your cough or shortness of breath have improved) 3) AND at least 7 days have passed since your symptoms first appeared.   Separate yourself from other people and animals in your home.   Call ahead before visiting your doctor.   Wear a facemask.   Cover your coughs and sneezes.   Wash your hands often with soap and water; hand  can be used, too.   Avoid sharing personal household items.   Wipe down surfaces used daily.   Monitor your symptoms. Seek prompt medical attention if your illness is worsening (e.g., difficulty breathing).    Before seeking care, call your healthcare provider.   If you have a medical emergency and need to call 911, notify the dispatch personnel that you have, or are being evaluated for COVID-19. If possible, put on a facemask before emergency medical services arrive.        Recommended precautions for household members, intimate partners, and caregivers in a home setting of a patient with symptomatic laboratory-confirmed COVID-19 or a patient under investigation.  Household members, intimate partners, and caregivers in the home setting awaiting tests results have close contact with a person with symptomatic, laboratory-confirmed COVID-19 or a person under investigation. Close contacts should monitor their health; they should call their  provider right away if they develop symptoms suggestive of COVID-19 (e.g., fever, cough, shortness of breath).    Close contacts should also follow these recommendations:   Make sure that you understand and can help the patient follow their provider's instructions for medication(s) and care. You should help the patient with basic needs in the home and provide support for getting groceries, prescriptions, and other personal needs.   Monitor the patient's symptoms. If the patient is getting sicker, call his or her healthcare provider and tell them that the patient has laboratory-confirmed COVID-19. If the patient has a medical emergency and you need to call 911, notify the dispatch personnel that the patient has, or is being evaluated for COVID-19.   Household members should stay in another room or be  from the patient. Household members should use a separate bedroom and bathroom, if available.   Prohibit visitors.   Household members should care for any pets in the home.   Make sure that shared spaces in the home have good air flow, such as by an air conditioner or an opened window, weather permitting.   Perform hand hygiene frequently. Wash your hands often with soap and water for at least 20 seconds or use an alcohol-based hand  (that contains > 60% alcohol) covering all surfaces of your hands and rubbing them together until they feel dry. Soap and water should be used preferentially.   Avoid touching your eyes, nose, and mouth.   The patient should wear a facemask. If the patient is not able to wear a facemask (for example, because it causes trouble breathing), caregivers should wear a mask when they are in the same room as the patient.   Wear a disposable facemask and gloves when you touch or have contact with the patient's blood, stool, or body fluids, such as saliva, sputum, nasal mucus, vomit, urine.  o Throw out disposable facemasks and gloves after using them. Do not  reuse.  o When removing personal protective equipment, first remove and dispose of gloves. Then, immediately clean your hands with soap and water or alcohol-based hand . Next, remove and dispose of facemask, and immediately clean your hands again with soap and water or alcohol-based hand .   You should not share dishes, drinking glasses, cups, eating utensils, towels, bedding, or other items with the patient. After the patient uses these items, you should wash them thoroughly (see below Wash laundry thoroughly).   Clean all high-touch surfaces, such as counters, tabletops, doorknobs, bathroom fixtures, toilets, phones, keyboards, tablets, and bedside tables, every day. Also, clean any surfaces that may have blood, stool, or body fluids on them.   Use a household cleaning spray or wipe, according to the label instructions. Labels contain instructions for safe and effective use of the cleaning product including precautions you should take when applying the product, such as wearing gloves and making sure you have good ventilation during use of the product.   Wash laundry thoroughly.  o Immediately remove and wash clothes or bedding that have blood, stool, or body fluids on them.  o Wear disposable gloves while handling soiled items and keep soiled items away from your body. Clean your hands (with soap and water or an alcohol-based hand ) immediately after removing your gloves.  o Read and follow directions on labels of laundry or clothing items and detergent. In general, using a normal laundry detergent according to washing machine instructions and dry thoroughly using the warmest temperatures recommended on the clothing label.   Place all used disposable gloves, facemasks, and other contaminated items in a lined container before disposing of them with other household waste. Clean your hands (with soap and water or an alcohol-based hand ) immediately after handling these  items. Soap and water should be used preferentially if hands are visibly dirty.   Discuss any additional questions with your state or local health department or healthcare provider. Check available hours when contacting your local health department.    For more information see CDC link below.      https://www.cdc.gov/coronavirus/2019-ncov/hcp/guidance-prevent-spread.html#precautions        Sources:  [facility], [facility] and [facility]

## 2020-05-11 DIAGNOSIS — J01.90 ACUTE SINUSITIS, RECURRENCE NOT SPECIFIED, UNSPECIFIED LOCATION: Primary | ICD-10-CM

## 2020-05-11 RX ORDER — LEVOFLOXACIN 500 MG/1
500 TABLET, FILM COATED ORAL DAILY
Qty: 10 TABLET | Refills: 0 | Status: SHIPPED | OUTPATIENT
Start: 2020-05-11 | End: 2020-05-12 | Stop reason: ALTCHOICE

## 2020-05-12 ENCOUNTER — TELEPHONE (OUTPATIENT)
Dept: INTERNAL MEDICINE | Facility: CLINIC | Age: 55
End: 2020-05-12

## 2020-05-12 ENCOUNTER — PATIENT MESSAGE (OUTPATIENT)
Dept: INTERNAL MEDICINE | Facility: CLINIC | Age: 55
End: 2020-05-12

## 2020-05-12 DIAGNOSIS — J01.90 ACUTE SINUSITIS, RECURRENCE NOT SPECIFIED, UNSPECIFIED LOCATION: Primary | ICD-10-CM

## 2020-05-12 RX ORDER — CYCLOBENZAPRINE HCL 10 MG
TABLET ORAL
Qty: 30 TABLET | Refills: 0 | Status: SHIPPED | OUTPATIENT
Start: 2020-05-12 | End: 2020-06-15

## 2020-05-12 RX ORDER — AMOXICILLIN AND CLAVULANATE POTASSIUM 875; 125 MG/1; MG/1
1 TABLET, FILM COATED ORAL 2 TIMES DAILY
Qty: 20 TABLET | Refills: 0 | Status: SHIPPED | OUTPATIENT
Start: 2020-05-12 | End: 2020-05-22

## 2020-05-12 RX ORDER — POTASSIUM CHLORIDE 750 MG/1
TABLET, EXTENDED RELEASE ORAL
Qty: 60 TABLET | Refills: 2 | Status: SHIPPED | OUTPATIENT
Start: 2020-05-12 | End: 2020-08-12

## 2020-05-12 NOTE — TELEPHONE ENCOUNTER
----- Message from Tanya Boogie sent at 5/12/2020  8:13 AM CDT -----  Contact: Jessy Holman states that Levoquin possible drug interaction with Tacrolimus, please call her back at [phone]. Thank you

## 2020-05-13 ENCOUNTER — TELEPHONE (OUTPATIENT)
Dept: PAIN MEDICINE | Facility: CLINIC | Age: 55
End: 2020-05-13

## 2020-05-13 NOTE — TELEPHONE ENCOUNTER
Pt called about medication refill for BETSEY she states she is to get 900mg and the pharmacy will not fill at this time all concerns have been froward to Dr. Desai//dwayne

## 2020-05-14 ENCOUNTER — PATIENT MESSAGE (OUTPATIENT)
Dept: INTERNAL MEDICINE | Facility: CLINIC | Age: 55
End: 2020-05-14

## 2020-05-14 ENCOUNTER — TELEPHONE (OUTPATIENT)
Dept: PAIN MEDICINE | Facility: CLINIC | Age: 55
End: 2020-05-14

## 2020-05-14 NOTE — TELEPHONE ENCOUNTER
----- Message from David Desai MD sent at 5/13/2020  5:17 PM CDT -----  Ok, I had discontinued this medication as Dr. Souza was prescribing it for her at this dose.  I'll shoot Dr. Souza a message and see if she is ok with the 900mg dosing.  ----- Message -----  From: Wendy De La Paz MA  Sent: 5/13/2020   3:59 PM CDT  To: David Desai MD    Pt is out of medication you discontinued this on  04/09/2020 and went to a lower amount  TAKE 1 AND 1/2 TABLETS BY MOUTH THREE TIMES DAILY. MAY CAUSE DROWSINESS, Normal  Dispense: 90 tablet  Refills: 3 ordered  [id number] [address] [facility]  [place] (Ph: [phone])  Order Details  Ordered on: 04/09/20  Discontinued on: 04/09/20  Authorizing provider: David Desai MD  Pt states she need the 900mg and if she cant get her medication refilled like Minna was giving to her she need to get scheduled for in-clinic injections

## 2020-05-15 ENCOUNTER — PATIENT MESSAGE (OUTPATIENT)
Dept: PAIN MEDICINE | Facility: CLINIC | Age: 55
End: 2020-05-15

## 2020-05-15 ENCOUNTER — PATIENT MESSAGE (OUTPATIENT)
Dept: INTERNAL MEDICINE | Facility: CLINIC | Age: 55
End: 2020-05-15

## 2020-05-15 DIAGNOSIS — M25.612 DECREASED SHOULDER MOBILITY, LEFT: ICD-10-CM

## 2020-05-15 DIAGNOSIS — G62.9 NEUROPATHY: ICD-10-CM

## 2020-05-15 RX ORDER — GABAPENTIN 300 MG/1
900 CAPSULE ORAL 2 TIMES DAILY
Qty: 120 CAPSULE | Refills: 3 | Status: SHIPPED | OUTPATIENT
Start: 2020-05-15 | End: 2020-12-06 | Stop reason: SDUPTHER

## 2020-05-16 DIAGNOSIS — R60.0 BILATERAL LEG EDEMA: ICD-10-CM

## 2020-05-16 DIAGNOSIS — Z29.89 PROPHYLACTIC IMMUNOTHERAPY: ICD-10-CM

## 2020-05-18 RX ORDER — FUROSEMIDE 40 MG/1
TABLET ORAL
Qty: 30 TABLET | Refills: 0 | Status: SHIPPED | OUTPATIENT
Start: 2020-05-18 | End: 2020-06-16

## 2020-05-27 ENCOUNTER — PATIENT OUTREACH (OUTPATIENT)
Dept: ADMINISTRATIVE | Facility: OTHER | Age: 55
End: 2020-05-27

## 2020-05-27 NOTE — PROGRESS NOTES
Patient's chart was reviewed.   Requested updates within Care Everywhere.  Immunizations reconciled.    Health Maintenance was updated.  Mammogram scheduled for 6/12/2020

## 2020-05-28 ENCOUNTER — OFFICE VISIT (OUTPATIENT)
Dept: ENDOCRINOLOGY | Facility: CLINIC | Age: 55
End: 2020-05-28
Payer: MEDICAID

## 2020-05-28 VITALS
WEIGHT: 189.13 LBS | RESPIRATION RATE: 18 BRPM | HEIGHT: 65 IN | TEMPERATURE: 98 F | HEART RATE: 89 BPM | DIASTOLIC BLOOD PRESSURE: 87 MMHG | BODY MASS INDEX: 31.51 KG/M2 | SYSTOLIC BLOOD PRESSURE: 144 MMHG

## 2020-05-28 DIAGNOSIS — E04.2 MULTINODULAR GOITER: ICD-10-CM

## 2020-05-28 PROCEDURE — 99214 PR OFFICE/OUTPT VISIT, EST, LEVL IV, 30-39 MIN: ICD-10-PCS | Mod: S$PBB,,, | Performed by: INTERNAL MEDICINE

## 2020-05-28 PROCEDURE — 99999 PR PBB SHADOW E&M-EST. PATIENT-LVL III: CPT | Mod: PBBFAC,,, | Performed by: INTERNAL MEDICINE

## 2020-05-28 PROCEDURE — 99999 PR PBB SHADOW E&M-EST. PATIENT-LVL III: ICD-10-PCS | Mod: PBBFAC,,, | Performed by: INTERNAL MEDICINE

## 2020-05-28 PROCEDURE — 99213 OFFICE O/P EST LOW 20 MIN: CPT | Mod: PBBFAC | Performed by: INTERNAL MEDICINE

## 2020-05-28 PROCEDURE — 99214 OFFICE O/P EST MOD 30 MIN: CPT | Mod: S$PBB,,, | Performed by: INTERNAL MEDICINE

## 2020-05-28 NOTE — PROGRESS NOTES
Referring Provider:  Jenny Suggs*    PCP:  Katy Childress MD    Reason for referral:   Multinodular goiter  CC:  Tired  Chronic pain    HPI:  Marcie Bazan 54 y.o. female  Patient was found to have multinodular thyroid by PET scan.  She has no history of thyroid disorder.  She is not on any medication for any thyroid disorder.  No family history of thyroid problem.  Patient has been feeling tired, losing weight and having nausea and chronic pain.  She had liver transplanted in 2017 after diagnosis of liver cancer.  No complaints of chest pain, vomiting, edema, or rash    Past Medical History:   Diagnosis Date    Alcohol abuse     Alcoholic hepatitis     Anemia of chronic disease     Arthritis     generialized    Cancer 12/26/2017    liver    Coagulopathy     Dyspnea on exertion 3/26/2020    Encounter for blood transfusion     End stage liver disease     History of alcohol abuse     Hyperlipidemia     Hypertension     Hyponatremia     Liver cirrhosis     Liver transplant candidate 12/26/2017    Obesity (BMI 30-39.9) 1/5/2016       Past Surgical History:   Procedure Laterality Date    BREAST BIOPSY Left     benign    CHOLECYSTECTOMY      COLONOSCOPY N/A 12/1/2017    Procedure: COLONOSCOPY;  Surgeon: Filemon Fuentes MD;  Location: [address]);  Service: Endoscopy;  Laterality: N/A;    COLONOSCOPY N/A 12/5/2017    Procedure: COLONOSCOPY;  Surgeon: José Chavira MD;  Location: [facility] ([address] [place] [address]);  Service: Endoscopy;  Laterality: N/A;    EXAMINATION UNDER ANESTHESIA N/A 2/18/2020    Procedure: EXAM UNDER ANESTHESIA;  Surgeon: Alden Horowitz MD;  Location: [facility] OR;  Service: General;  Laterality: N/A;    EXCISIONAL HEMORRHOIDECTOMY N/A 2/18/2020    Procedure: HEMORRHOIDECTOMY;  Surgeon: Alden Horowitz MD;  Location: [facility] OR;  Service: General;  Laterality: N/A;    HYSTERECTOMY      complete     INJECTION OF ANESTHETIC AGENT AROUND PUDENDAL NERVE N/A 2/18/2020     Procedure: BLOCK, NERVE, PUDENDAL;  Surgeon: Alden Horowitz MD;  Location: [facility] OR;  Service: General;  Laterality: N/A;    INJECTION OF ANESTHETIC AGENT INTO SACROILIAC JOINT Right 6/14/2019    Procedure: right Sacroiliac Joint Injection;  Surgeon: David Desai MD;  Location: [facility];  Service: Pain Management;  Laterality: Right;    INJECTION OF ANESTHETIC AGENT INTO SACROILIAC JOINT Bilateral 2/7/2020    Procedure: Bilateral GT bursa + Bilateral Sacroiliac Joint Injection;  Surgeon: David Desai MD;  Location: [facility];  Service: Pain Management;  Laterality: Bilateral;    INJECTION OF JOINT Bilateral 2/7/2020    Procedure: Bilateral GT bursa + Bilateral Sacroiliac Joint Injection;  Surgeon: David Desai MD;  Location: [facility];  Service: Pain Management;  Laterality: Bilateral;    INJECTION OF PIRIFORMIS MUSCLE Right 6/14/2019    Procedure: Right piriformis injection;  Surgeon: David Desai MD;  Location: [facility];  Service: Pain Management;  Laterality: Right;    LIVER TRANSPLANT  12/2017       Social History     Socioeconomic History    Marital status: Single     Spouse name: Not on file    Number of children: 2    Years of education: Not on file    Highest education level: Not on file   Occupational History    Not on file   Social Needs    Financial resource strain: Not on file    Food insecurity:     Worry: Not on file     Inability: Not on file    Transportation needs:     Medical: Not on file     Non-medical: Not on file   Tobacco Use    Smoking status: Never Smoker    Smokeless tobacco: Never Used   Substance and Sexual Activity    Alcohol use: No     Frequency: Never     Comment: Currently admitted to inpatient rehab 7/2017    Drug use: No    Sexual activity: Not Currently   Lifestyle    Physical activity:     Days per week: Not on file     Minutes per session: Not on file    Stress: Not on file   Relationships    Social connections:     Talks  on phone: Not on file     Gets together: Not on file     Attends [employer] service: Not on file     Active member of club or organization: Not on file     Attends meetings of clubs or organizations: Not on file     Relationship status: Not on file   Other Topics Concern    Not on file   Social History Narrative    Not on file         ROS:   History of liver cancer  Status post liver transplant  Chronic pain  Fatigue  Weight loss  Nausea  ROS otherwise neg except for what is mentioned in the PMH, PSH and HPI    PE:  Vitals:    05/28/20 1307   BP: (!) 144/87   Pulse: 89   Resp: 18   Temp: 97.8 °F (36.6 °C)     Alert and oriented  No acute distress  No acne  No Proptosis or conjunctivitis  No rash on tongue, + teeth  No goitre by inspection  Thyroid gland is not palpable  No cervical lymphadenopathy  Heart reg, no gallop  Lungs cta, no wheezing  Abd soft, no tnd  No edema in lower legs  No rash  No bruises  Speech normal  Behavior normal  No tremor  + obesity  Body mass index is 31.48 kg/m².      Lab:    Lab Results   Component Value Date    TSH 0.132 (L) 03/26/2020    FREET4 0.97 03/26/2020       No components found for: AGBA1C  Lab Results   Component Value Date    CHOL 129 04/27/2020    TRIG 128 04/27/2020    HDL 34 (L) 04/27/2020    CHOLHDL 26.4 04/27/2020    TOTALCHOLEST 3.8 04/27/2020    NONHDLCHOL 95 04/27/2020     BMP  Lab Results   Component Value Date     04/13/2020    K 4.3 04/13/2020     04/13/2020    CO2 27 04/13/2020    BUN 8 04/13/2020    CREATININE 0.8 04/13/2020    CALCIUM 9.3 04/13/2020    ANIONGAP 8 04/13/2020    ESTGFRAFRICA >60.0 04/13/2020    EGFRNONAA >60.0 04/13/2020     Lab Results   Component Value Date    CREATRANDUR 160.0 10/20/2017       A/P:  Multinodular goiter  TSH was low  Workup will include thyroid ultrasound and blood test for thyroid function    -     US Soft Tissue Head Neck Thyroid; Future; Expected date: 05/28/2020  -     TSH; Future; Expected date: 05/28/2020  -      T4, free; Future; Expected date: 05/28/2020    Appt in 5 months.      Pt understands the plan and instructions.

## 2020-05-28 NOTE — LETTER
May 28, 2020      Jenny Suggs PA-C  [address] [facility]  [place] [address]           [facility]  [address] [address] [facility] [place] [address]  Phone: [phone]  Fax: [phone]          Patient: Marcie Bazan   MR Number: [id number]   YOB: 1965   Date of Visit: 5/28/2020       Dear Jenny Suggs:    Thank you for referring Marcie Bazan to me for evaluation. Attached you will find relevant portions of my assessment and plan of care.    If you have questions, please do not hesitate to call me. I look forward to following Marcie Bazan along with you.    Sincerely,    Christian Navarro MD    Enclosure  CC:  No Recipients    If you would like to receive this communication electronically, please contact [email or web address] or [phone] to request more information on [employer] access.    For providers and/or their staff who would like to refer a patient to [facility], please contact us through our one-stop-shop provider referral line, [facility], at [phone].    If you feel you have received this communication in error or would no longer like to receive these types of communications, please e-mail [email or web address]

## 2020-06-05 ENCOUNTER — TELEPHONE (OUTPATIENT)
Dept: RADIOLOGY | Facility: HOSPITAL | Age: 55
End: 2020-06-05

## 2020-06-08 ENCOUNTER — HOSPITAL ENCOUNTER (OUTPATIENT)
Dept: RADIOLOGY | Facility: HOSPITAL | Age: 55
Discharge: HOME | End: 2020-06-08
Attending: INTERNAL MEDICINE
Payer: MEDICAID

## 2020-06-08 ENCOUNTER — TELEPHONE (OUTPATIENT)
Dept: PAIN MEDICINE | Facility: CLINIC | Age: 55
End: 2020-06-08

## 2020-06-08 DIAGNOSIS — E04.2 MULTINODULAR GOITER: ICD-10-CM

## 2020-06-08 PROCEDURE — 76536 US SOFT TISSUE HEAD NECK THYROID: ICD-10-PCS | Mod: 26,,, | Performed by: RADIOLOGY

## 2020-06-08 PROCEDURE — 76536 US EXAM OF HEAD AND NECK: CPT | Mod: 26,,, | Performed by: RADIOLOGY

## 2020-06-08 PROCEDURE — 76536 US EXAM OF HEAD AND NECK: CPT | Mod: TC

## 2020-06-08 NOTE — TELEPHONE ENCOUNTER
Made appt with manny ,on [address],[facility], 2 pm . Pt understood. All questions answered.   Ranulfo Cloud MA  [facility]

## 2020-06-09 DIAGNOSIS — E04.2 MULTIPLE THYROID NODULES: Primary | ICD-10-CM

## 2020-06-10 DIAGNOSIS — Z94.4 LIVER REPLACED BY TRANSPLANT: ICD-10-CM

## 2020-06-10 NOTE — TELEPHONE ENCOUNTER
Dr. Souza reviewed your labs.  Sent message to let patient know labs are good, but Prograf level too high.  Please decrease prograf dose to 2 mg in the morning and 1 mg in the evening.  Repeat labs on 06/15/20

## 2020-06-10 NOTE — TELEPHONE ENCOUNTER
----- Message from Joselin Souza MD sent at 6/10/2020  3:10 PM CDT -----  Decrease tacro to 2mg in the morning and 1mg in the evening and repeat labs next week

## 2020-06-11 RX ORDER — TACROLIMUS 1 MG/1
CAPSULE ORAL
Qty: 90 CAPSULE | Refills: 11 | Status: SHIPPED | OUTPATIENT
Start: 2020-06-11 | End: 2021-07-08 | Stop reason: SDUPTHER

## 2020-06-12 ENCOUNTER — HOSPITAL ENCOUNTER (OUTPATIENT)
Dept: RADIOLOGY | Facility: HOSPITAL | Age: 55
Discharge: HOME | End: 2020-06-12
Attending: PHYSICIAN ASSISTANT
Payer: MEDICAID

## 2020-06-12 ENCOUNTER — PATIENT OUTREACH (OUTPATIENT)
Dept: ADMINISTRATIVE | Facility: OTHER | Age: 55
End: 2020-06-12

## 2020-06-12 DIAGNOSIS — Z12.31 ENCOUNTER FOR SCREENING MAMMOGRAM FOR MALIGNANT NEOPLASM OF BREAST: ICD-10-CM

## 2020-06-12 PROCEDURE — 77067 SCR MAMMO BI INCL CAD: CPT | Mod: TC

## 2020-06-12 PROCEDURE — 77063 MAMMO DIGITAL SCREENING BILAT WITH TOMOSYNTHESIS_CAD: ICD-10-PCS | Mod: 26,,, | Performed by: RADIOLOGY

## 2020-06-12 PROCEDURE — 77063 BREAST TOMOSYNTHESIS BI: CPT | Mod: 26,,, | Performed by: RADIOLOGY

## 2020-06-12 PROCEDURE — 77067 MAMMO DIGITAL SCREENING BILAT WITH TOMOSYNTHESIS_CAD: ICD-10-PCS | Mod: 26,,, | Performed by: RADIOLOGY

## 2020-06-12 PROCEDURE — 77067 SCR MAMMO BI INCL CAD: CPT | Mod: 26,,, | Performed by: RADIOLOGY

## 2020-06-15 ENCOUNTER — TELEPHONE (OUTPATIENT)
Dept: ORTHOPEDICS | Facility: CLINIC | Age: 55
End: 2020-06-15

## 2020-06-15 ENCOUNTER — LAB VISIT (OUTPATIENT)
Dept: LAB | Facility: HOSPITAL | Age: 55
End: 2020-06-15
Attending: INTERNAL MEDICINE
Payer: MEDICAID

## 2020-06-15 ENCOUNTER — OFFICE VISIT (OUTPATIENT)
Dept: PAIN MEDICINE | Facility: CLINIC | Age: 55
End: 2020-06-15
Payer: MEDICAID

## 2020-06-15 VITALS
HEART RATE: 109 BPM | RESPIRATION RATE: 20 BRPM | HEIGHT: 65 IN | BODY MASS INDEX: 29.82 KG/M2 | WEIGHT: 179 LBS | DIASTOLIC BLOOD PRESSURE: 86 MMHG | SYSTOLIC BLOOD PRESSURE: 142 MMHG

## 2020-06-15 DIAGNOSIS — Z94.4 LIVER REPLACED BY TRANSPLANT: ICD-10-CM

## 2020-06-15 DIAGNOSIS — E83.42 HYPOMAGNESEMIA: ICD-10-CM

## 2020-06-15 DIAGNOSIS — M70.61 GREATER TROCHANTERIC BURSITIS OF BOTH HIPS: ICD-10-CM

## 2020-06-15 DIAGNOSIS — M46.1 SACROILIITIS: ICD-10-CM

## 2020-06-15 DIAGNOSIS — Z03.818 ENCOUNTER FOR OBSERVATION FOR SUSPECTED EXPOSURE TO OTHER BIOLOGICAL AGENTS RULED OUT: Primary | ICD-10-CM

## 2020-06-15 DIAGNOSIS — M25.612 DECREASED SHOULDER MOBILITY, LEFT: Primary | ICD-10-CM

## 2020-06-15 DIAGNOSIS — G62.9 NEUROPATHY: ICD-10-CM

## 2020-06-15 DIAGNOSIS — M70.62 GREATER TROCHANTERIC BURSITIS OF BOTH HIPS: ICD-10-CM

## 2020-06-15 LAB
ALBUMIN SERPL BCP-MCNC: 3.5 G/DL (ref 3.5–5.2)
ALP SERPL-CCNC: 176 U/L (ref 55–135)
ALT SERPL W/O P-5'-P-CCNC: 19 U/L (ref 10–44)
ANION GAP SERPL CALC-SCNC: 10 MMOL/L (ref 8–16)
AST SERPL-CCNC: 16 U/L (ref 10–40)
BASOPHILS # BLD AUTO: 0.03 K/UL (ref 0–0.2)
BASOPHILS NFR BLD: 0.3 % (ref 0–1.9)
BILIRUB SERPL-MCNC: 0.4 MG/DL (ref 0.1–1)
BUN SERPL-MCNC: 8 MG/DL (ref 6–20)
CALCIUM SERPL-MCNC: 9.5 MG/DL (ref 8.7–10.5)
CHLORIDE SERPL-SCNC: 105 MMOL/L (ref 95–110)
CO2 SERPL-SCNC: 25 MMOL/L (ref 23–29)
CREAT SERPL-MCNC: 0.8 MG/DL (ref 0.5–1.4)
DIFFERENTIAL METHOD: ABNORMAL
EOSINOPHIL # BLD AUTO: 0.1 K/UL (ref 0–0.5)
EOSINOPHIL NFR BLD: 1 % (ref 0–8)
ERYTHROCYTE [DISTWIDTH] IN BLOOD BY AUTOMATED COUNT: 14.7 % (ref 11.5–14.5)
EST. GFR  (AFRICAN AMERICAN): >60 ML/MIN/1.73 M^2
EST. GFR  (NON AFRICAN AMERICAN): >60 ML/MIN/1.73 M^2
GLUCOSE SERPL-MCNC: 92 MG/DL (ref 70–110)
HCT VFR BLD AUTO: 39.6 % (ref 37–48.5)
HGB BLD-MCNC: 11.4 G/DL (ref 12–16)
IMM GRANULOCYTES # BLD AUTO: 0.02 K/UL (ref 0–0.04)
IMM GRANULOCYTES NFR BLD AUTO: 0.2 % (ref 0–0.5)
LYMPHOCYTES # BLD AUTO: 2.6 K/UL (ref 1–4.8)
LYMPHOCYTES NFR BLD: 23.7 % (ref 18–48)
MAGNESIUM SERPL-MCNC: 1.5 MG/DL (ref 1.6–2.6)
MCH RBC QN AUTO: 23.9 PG (ref 27–31)
MCHC RBC AUTO-ENTMCNC: 28.8 G/DL (ref 32–36)
MCV RBC AUTO: 83 FL (ref 82–98)
MONOCYTES # BLD AUTO: 0.7 K/UL (ref 0.3–1)
MONOCYTES NFR BLD: 6.1 % (ref 4–15)
NEUTROPHILS # BLD AUTO: 7.5 K/UL (ref 1.8–7.7)
NEUTROPHILS NFR BLD: 68.7 % (ref 38–73)
NRBC BLD-RTO: 0 /100 WBC
PLATELET # BLD AUTO: 85 K/UL (ref 150–350)
PMV BLD AUTO: 10.8 FL (ref 9.2–12.9)
POTASSIUM SERPL-SCNC: 4.1 MMOL/L (ref 3.5–5.1)
PROT SERPL-MCNC: 7.3 G/DL (ref 6–8.4)
RBC # BLD AUTO: 4.76 M/UL (ref 4–5.4)
SODIUM SERPL-SCNC: 140 MMOL/L (ref 136–145)
WBC # BLD AUTO: 10.97 K/UL (ref 3.9–12.7)

## 2020-06-15 PROCEDURE — 80197 ASSAY OF TACROLIMUS: CPT

## 2020-06-15 PROCEDURE — 85025 COMPLETE CBC W/AUTO DIFF WBC: CPT

## 2020-06-15 PROCEDURE — 99999 PR PBB SHADOW E&M-EST. PATIENT-LVL V: ICD-10-PCS | Mod: PBBFAC,,, | Performed by: PHYSICIAN ASSISTANT

## 2020-06-15 PROCEDURE — 99999 PR PBB SHADOW E&M-EST. PATIENT-LVL V: CPT | Mod: PBBFAC,,, | Performed by: PHYSICIAN ASSISTANT

## 2020-06-15 PROCEDURE — 36415 COLL VENOUS BLD VENIPUNCTURE: CPT

## 2020-06-15 PROCEDURE — 99214 OFFICE O/P EST MOD 30 MIN: CPT | Mod: S$PBB,,, | Performed by: PHYSICIAN ASSISTANT

## 2020-06-15 PROCEDURE — 80053 COMPREHEN METABOLIC PANEL: CPT

## 2020-06-15 PROCEDURE — 99214 PR OFFICE/OUTPT VISIT, EST, LEVL IV, 30-39 MIN: ICD-10-PCS | Mod: S$PBB,,, | Performed by: PHYSICIAN ASSISTANT

## 2020-06-15 PROCEDURE — 83735 ASSAY OF MAGNESIUM: CPT

## 2020-06-15 PROCEDURE — 99215 OFFICE O/P EST HI 40 MIN: CPT | Mod: PBBFAC | Performed by: PHYSICIAN ASSISTANT

## 2020-06-15 RX ORDER — GABAPENTIN 300 MG/1
300 CAPSULE ORAL 3 TIMES DAILY
Qty: 90 CAPSULE | Refills: 1 | Status: SHIPPED | OUTPATIENT
Start: 2020-06-15 | End: 2020-11-03 | Stop reason: SDUPTHER

## 2020-06-15 RX ORDER — GABAPENTIN 600 MG/1
600 TABLET ORAL 3 TIMES DAILY
Qty: 90 TABLET | Refills: 1 | Status: SHIPPED | OUTPATIENT
Start: 2020-06-15 | End: 2020-08-14
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
## 2020-06-15 NOTE — PATIENT INSTRUCTIONS
Pain Management Pre-Procedure Instructions  (also available in your [employer] account)    Patient Name:___Marcie Bazan____MRN: [id number] you are scheduled to have the following procedure:__ Joint Injection  _with______KATJA Desai MD on: ___07/07/2020____ at: [facility]    You will be contacted the day before your procedure to be given an arrival and procedure time                                                                                                            Day of Procedure   Ensure you have obtained arrival time from the Pain Management department  o We will call 48 hours in advance with your arrival time. Please check any voicemails you may have  o If you arrive past your scheduled procedure time, you may be asked to reschedule your procedure.   For your safety, ensure you have a  with you to remain present throughout your procedure   o If you arrive without a responsible adult to stay with you and drive you home, you may be asked to reschedule your procedure   Take all of your prescribed medications (exceptions noted below) with a small amount of water  o Do not have to stop meds  o [x] Nothing by mouth after midnight the night before your procedure.  It is ok to take your regular medications with a small sip of water.     Wear loose, comfortable clothing    You may wear glasses, dentures, contact lenses and/or hearing aids. Please leave all valuable items at home.   Contact the Pain Management department at [phone] or via [employer] if you are:  o Running a fever above 100 degrees  o Feel ill, have any type of infection, or are taking antibiotics now or have in the past 2 weeks  o Have had any outpatient procedures in the past 2 weeks (colonoscopy, major dental work, etc.)  o If you are allergic to iodine, IVP dye or shellfish.      Contact Information: [phone], ask to speak to the pain management department with any questions or concerns or send a message  via [employer]

## 2020-06-16 LAB — TACROLIMUS BLD-MCNC: 7.6 NG/ML (ref 5–15)

## 2020-06-17 ENCOUNTER — TELEPHONE (OUTPATIENT)
Dept: TRANSPLANT | Facility: CLINIC | Age: 55
End: 2020-06-17

## 2020-06-17 NOTE — TELEPHONE ENCOUNTER
----- Message from Joselin Souza MD sent at 6/16/2020  3:39 PM CDT -----  Reviewed, nothing to do; repeat per routine

## 2020-06-17 NOTE — TELEPHONE ENCOUNTER
Dr. Souza reviewed your labs.  Sent patient message to let her know labs are stable, no changes.  Next labs due on 09/28/20

## 2020-06-22 NOTE — PROGRESS NOTES
Referring Provider:    Christian Navarro Md  [address] [facility] [place] [address]  Subjective:   Patient: Marcie Bazan [id number], :1965   Visit date:2020 5:09 PM    Chief Complaint:  Thyroid Nodule    HPI:  Marcie is a 54 y.o. female who I was asked to see in consultation for evaluation of the following issue(s):    COMPRESSIVE SYMPTOMS OR MINOR RISK FACTORS FOR THYROID CANCER (Negative unless checked):  []  Increasing in size over the past 6 months  []  Dysphagia   []  Dyspnea on exertion  []  Orthopnea  []  Hemoptysis  []  Voice changes  []  Pain  [x]  AGE >45  Actual age 55 yo  [x]  FEMALE     HIGH RISK HISTORY FOR THYROID CANCER:  []  Thyroid cancer in 1 or more first degree relatives  []  History of radiation exposure to the neck  []  Prior thyroidectomy with dx of thyroid cancer  []  PET positive nodule  []  Multiple Endocrine Neoplasia  []  Familial Medullary Thyroid Cancer    (2009 REVISED AMERICAN THYROID ASSOCIATION MANAGEMENT GUIDELINES FOR PATIENTS WITH THYROID NODULES AND DIFFERENTIATED THYROID CANCER)      Lab Results   Component Value Date    TSH 2.237 2020    TSH 0.132 (L) 2020    TSH 0.828 2020    TSH 1.275 2020    TSH 1.051 2018    CALCIUM 9.5 06/15/2020    CALCIUM 9.5 2020    CALCIUM 9.3 2020    CALCIUM 9.6 2020    CALCIUM 9.5 2020       BP Readings from Last 3 Encounters:   20 (!) 161/97   06/15/20 (!) 142/86   20 (!) 144/87     Snoring / Sleep:     [place] Questionnaire (validated BRANNON screening questionnaire)    1 -- Snoring/apnea    1 -- Fatigue    Body mass index is 32.25 kg/m².  (>25 is overweight, >30 is obese)    Blood Pressure = Hypertension  (PreHTN 120-139/80-89, Stg1 140-159/90-99, Stg2 >160/>100)  [place] = 3 of three BRANNON categories are positive (high risk is 2-3 positive categories)       STOP-Bang Questionnaire (validated BRANNON screening questionnaire)  Negative unless checked off.  [x] Snoring    [x]   Tired/Fatigued/Sleepy  [x] Obstruction (apneas/choking)  [x] Pressure (HTN)  [] BMI >35  [x] Age >50  [] Neck >40 cm  [] Gender male   STOP-Bang = 5 (low risk 0-2,high risk 3-8)    Neck circumference 14 cm [?BRANNON risk if >43cm (17in) male or >41cm (15.5 in) female]  Sleep position Supine = occasional    Non-supine = occasional  Mouth breathing during sleep - possibly                   Review of Systems:  Negative unless checked off.  Gen:  []fever   []fatigue  HENT: []nosebleeds  []dental problem   Eyes:  []photophobia  []visual disturbance  Resp:  []chest tightness []wheezing  Card:  []chest pain  []leg swelling  GI:  []abdominal pain []blood in stool  :  []dysuria  []hematuria  Musc:  []joint swelling []gait problem  Skin:  []color change []pallor  Neuro: []seizures  []numbness  Hem:  []bruise/bleed easily  Psych: []hallucinations [] elicit substance abuse  Allergy/Imm: is allergic to ferrous sulfate.    Her meds, allergies, medical, surgical, social & family histories were reviewed & updated:  -     She has a current medication list which includes the following prescription(s): atorvastatin, biotin, cyclobenzaprine, famotidine, furosemide, gabapentin, gabapentin, gabapentin, levocetirizine, magnesium oxide, mycophenolate, nifedipine, ondansetron, potassium chloride, senna-docusate 8.6-50 mg, tacrolimus, diclofenac sodium, and ergocalciferol.  -     She  has a past medical history of Alcohol abuse, Alcoholic hepatitis, Anemia of chronic disease, Arthritis, Cancer (12/26/2017), Coagulopathy, Dyspnea on exertion (3/26/2020), Encounter for blood transfusion, End stage liver disease, History of alcohol abuse, Hyperlipidemia, Hypertension, Hyponatremia, Liver cirrhosis, Liver transplant candidate (12/26/2017), and Obesity (BMI 30-39.9) (1/5/2016).   -     She does not have any pertinent problems on file.   -     She  has a past surgical history that includes Cholecystectomy; Hysterectomy; Breast biopsy (Left);  Colonoscopy (N/A, 12/1/2017); Colonoscopy (N/A, 12/5/2017); Liver transplant (12/2017); Injection of piriformis muscle (Right, 6/14/2019); Injection of anesthetic agent into sacroiliac joint (Right, 6/14/2019); Injection of joint (Bilateral, 2/7/2020); Injection of anesthetic agent into sacroiliac joint (Bilateral, 2/7/2020); Excisional hemorrhoidectomy (N/A, 2/18/2020); Injection of anesthetic agent around pudendal nerve (N/A, 2/18/2020); and Examination under anesthesia (N/A, 2/18/2020).  -     She  reports that she has never smoked. She has never used smokeless tobacco. She reports that she does not drink alcohol or use drugs.  -     Her family history includes Alzheimer's disease in her mother; Breast cancer in her paternal aunt; Depression in her maternal grandfather; Diabetes in her father and sister; Hypertension in her father and mother.  -     She is allergic to ferrous sulfate.    Objective:   Physical Exam:  Vitals:  BP (!) 161/97   Pulse 106   Temp 98.2 °F (36.8 °C) (Tympanic)   Wt 87.9 kg (193 lb 12.6 oz)   LMP 01/01/1985 (Approximate) Comment: 1985  BMI 32.25 kg/m²   General appearance:  Well developed, well nourished    Eyes:  Extraocular motions intact, PERRL    Communication:  no hoarseness, no dysphonia    Ears:  Otoscopy of external auditory canals and tympanic membranes was normal, clinical speech reception thresholds grossly intact, no mass/lesion of auricle.  Nose:  No masses/lesions of external nose, nasal mucosa, septum, and turbinates were within normal limits.  Mouth:  No mass/lesion of lips, teeth, gums, hard/soft palate, tongue, tonsils, or oropharynx.    Cardiovascular:  No pedal edema; Radial Pulses +2     Neck & Lymphatics:  No cervical lymphadenopathy, no neck mass/crepitus/ asymmetry, trachea is midline.  THYROID: palpable nodule near isthmus on right; does correlate with location she feels pressure/discomfort  Psych: Oriented x3,  Alert with normal mood and  affect.     Respiration/Chest:  Symmetric expansion during respiration, normal respiratory effort.    Skin:  Warm and intact. No ulcerations of face, scalp, neck.      ULTRASOUND:  Results for orders placed during the hospital encounter of 06/08/20   US Soft Tissue Head Neck Thyroid    Narrative EXAMINATION:  US SOFT TISSUE HEAD NECK THYROID    CLINICAL HISTORY:  Nontoxic multinodular goiter    TECHNIQUE:  Ultrasound of the thyroid and cervical lymph nodes was performed.    COMPARISON:  As above    FINDINGS:  Overall gland volume measures 6.5 cc.  The right lobe measures 3.8 x 1.4 x 1.2 cm.  Left lobe measures 3.6 x 1.3 x 1.3 cm.  There is a colloid cystic nodule measuring 5 mm in the upper right lobe.  There is a solid heterogeneous nodule measuring up to 8 mm in the midportion of the right lobe.  Within the inferior portion of the right lobe there is a 1.6 cm solid nodule which is isoechoic to the gland. Within the isthmus there is also a 1.6 cm nodule with similar characteristics.  Per ACR TI rads criteria, follow-up in 1, 3, and 5 years is suggested for the aforementioned nodules.    Nodules are seen in the left lobe as well.  For example, there is a solid isoechoic nodule with hypoechoic halo in the midportion of the gland that measures up to 1.4 cm.  A mixed cystic and solid nodule seen in the lower portion of the left lobe measuring just under 1 cm.      Impression Right lower lobe in isthmus nodules requiring follow-up as above.  No nodule meeting criteria for fine-needle aspiration at this time.      Electronically signed by: Blaine Das MD  Date:    06/08/2020  Time:    08:12           PATHOLOGY:  None      Assessment & Plan:   Marcie was seen today for thyroid nodule.    Diagnoses and all orders for this visit:    BRANNON (obstructive sleep apnea)    Multiple thyroid nodules  -     Ambulatory referral/consult to ENT      Marcie has presents with a complex thyroid problem.  The imaging and laboratory values  were reviewed at length.  Marcie does not have compressive symptoms or cosmetic deformity from the size of the mass.  Marcie does not have a HIGH RISK HISTORY for thyroid cancer.      Multiinstitutional Analysis of Thyroid Nodule Risk Stratification Using the American College of Radiology Thyroid Imaging Reporting and Data System    []  TR1 (0 POINTS): BENIGN  o no FNA required; risk of malignancy 0.3%  [] TR2  (2 POINTS): NOT SUSPICIOUS  o No FNA required; risk of malignancy 1.5%  [x] TR3  (3 POINTS): MILDLY SUSPICIOUS; risk of malignancy 4.8%  o ?2.5 cm FNA  o ?1.5 cm follow up, follow up: 1, 3 and 5 years  [] TR4  (4-6 POINTS): MODERATELY SUSPICIOUS; risk of malignancy 9.1%  o ?1.5 cm FNA  o ?1.0 cm follow up, follow up: 1, 2, 3 and 5 years  [] TR5  (7 OR MORE POINTS):  HIGHLY SUSPICIOUS; risk of malignancy 35%  o ?1.0 cm FNA  o ?0.5 cm follow up, annual follow up for up to 5 years       Marcie has not had a prior biopsy     Bilateral nodules: Yes  Nodules 3cm or greater: No    Based on a lengthy discussion of treatment options and the above information, my recommendation is repeat US at years 1,3,5.        BRANNON- High risk by multiple validated BRANNON screening questionnaire's.  HST ordered.  If positive will arrange for follow up in Sleep Clinic.

## 2020-06-26 ENCOUNTER — TELEPHONE (OUTPATIENT)
Dept: PULMONOLOGY | Facility: CLINIC | Age: 55
End: 2020-06-26

## 2020-06-26 ENCOUNTER — OFFICE VISIT (OUTPATIENT)
Dept: OTOLARYNGOLOGY | Facility: CLINIC | Age: 55
End: 2020-06-26
Payer: MEDICAID

## 2020-06-26 VITALS
BODY MASS INDEX: 32.25 KG/M2 | HEART RATE: 106 BPM | DIASTOLIC BLOOD PRESSURE: 97 MMHG | TEMPERATURE: 98 F | SYSTOLIC BLOOD PRESSURE: 161 MMHG | WEIGHT: 193.81 LBS

## 2020-06-26 DIAGNOSIS — E04.2 MULTIPLE THYROID NODULES: ICD-10-CM

## 2020-06-26 DIAGNOSIS — G47.33 OSA (OBSTRUCTIVE SLEEP APNEA): Primary | ICD-10-CM

## 2020-06-26 PROCEDURE — 99214 OFFICE O/P EST MOD 30 MIN: CPT | Mod: S$PBB,,, | Performed by: OTOLARYNGOLOGY

## 2020-06-26 PROCEDURE — 99999 PR PBB SHADOW E&M-EST. PATIENT-LVL IV: CPT | Mod: PBBFAC,,, | Performed by: OTOLARYNGOLOGY

## 2020-06-26 PROCEDURE — 99214 PR OFFICE/OUTPT VISIT, EST, LEVL IV, 30-39 MIN: ICD-10-PCS | Mod: S$PBB,,, | Performed by: OTOLARYNGOLOGY

## 2020-06-26 PROCEDURE — 99214 OFFICE O/P EST MOD 30 MIN: CPT | Mod: PBBFAC | Performed by: OTOLARYNGOLOGY

## 2020-06-26 PROCEDURE — 99999 PR PBB SHADOW E&M-EST. PATIENT-LVL IV: ICD-10-PCS | Mod: PBBFAC,,, | Performed by: OTOLARYNGOLOGY

## 2020-06-26 NOTE — TELEPHONE ENCOUNTER
----- Message from Luz Maria Bhandari sent at 6/26/2020  9:42 AM CDT -----  [facility]

## 2020-06-26 NOTE — PATIENT INSTRUCTIONS
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  He receives no payments or other incentives to participate in the program. Their website offers patients excellent informational videos and assists in coordination of care.  To visit this site, please visit:    [email or web address]        WHAT IS THE THYROID GLAND?  The thyroid gland is a butterfly-shaped endocrine gland that is normally located in the lower front of the neck. The thyroids job is to make thyroid hormones, which are secreted into the blood and then carried to every tissue in the body. Thyroid hormone helps the body use energy, stay warm and keep the brain, heart, muscles, and other organs working as they should.    WHAT IS A THYROID NODULE?  The term thyroid nodule refers to an abnormal growth of thyroid cells that forms a lump within the thyroid gland. Although the vast majority of thyroid nodules are benign (noncancerous), a small proportion of thyroid nodules do contain thyroid cancer. In order to diagnose and treat thyroid cancer at the earliest stage, most thyroid nodules need some type of evaluation.        WHAT ARE THE SYMPTOMS OF A THYROID NODULE?  Most thyroid nodules do not cause symptoms. Often, thyroid nodules are discovered incidentally during a routine physical examination or on imaging tests like CT scans or neck ultrasound done for completely unrelated reasons. Occasionally, patients themselves find thyroid nodules by noticing a lump in their neck while looking in a mirror, buttoning their collar, or fastening a necklace. Abnormal thyroid function tests may occasionally be the reason a thyroid nodule is found. Thyroid nodules may produce excess amounts of thyroid hormone causing hyperthyroidism (see the Hyperthyroidism brochure). However, most thyroid nodules, including those that cancerous, are actually non-functioning, meaning tests like TSH are normal. Rarely, patients with thyroid nodules may  complain of pain in the neck, jaw, or ear. If a nodule is large enough to compress the windpipe or esophagus, it may cause difficulty with breathing, swallowing, or cause a tickle in the throat. Even less commonly, hoarseness can be caused if the nodule invades the nerve that controls the vocal cords but this is usually related to thyroid cancer.    The important points to remember are the following:    Thyroid nodules generally do not cause symptoms.  Thyroid tests are most typically normal--even when cancer is present in a nodule.  The best way to find a thyroid nodule is to make sure your doctor checks your neck!    WHAT CAUSES THYROID NODULES AND HOW COMMON ARE THEY?  We do not know what causes most thyroid nodules but they are extremely common. By age 60, about one-half of all people have a thyroid nodule that can be found either through examination or with imaging. Fortunately, over 90% of such nodules are benign. Hashimotos thyroiditis, which is the most common cause of hypothyroidism (see Hypothyroidism brochure), is associated with an increased risk of thyroid nodules. Iodine deficiency, which is very uncommon in the [place], is also known to cause thyroid nodules.    HOW IS A THYROID NODULE EVALUATED AND DIAGNOSED?  Once the nodule is discovered, your doctor will try to determine whether the rest of your thyroid is healthy or whether the entire thyroid gland has been affected by a more general condition such as hyperthyroidism or hypothyroidism. Your physician will feel the thyroid to see whether the entire gland is enlarged and whether a single or multiple nodules are present. The initial laboratory tests may include measurement of thyroid hormone (thyroxine, or T4) and thyroid-stimulating hormone (TSH) in your blood to determine whether your thyroid is functioning normally.    Since its usually not possible to determine whether a thyroid nodule is cancerous by physical examination and blood  tests alone, the evaluation of the thyroid nodules often includes specialized tests such as thyroid ultrasonography and fine needle biopsy.    THYROID ULTRASOUND:  Thyroid ultrasound is a key tool for thyroid nodule evaluation. It uses high-frequency sound waves to obtain a picture of the thyroid. This very accurate test can easily determine if a nodule is solid or fluid filled (cystic), and it can determine the precise size of the nodule. Ultrasound can help identify suspicious nodules since some ultrasound characteristics of thyroid nodules are more frequent in thyroid cancer than in noncancerous nodules. Thyroid ultrasound can identify nodules that are too small to feel during a physical examination. Ultrasound can also be used to accurately guide a needle directly into a nodule when your doctor thinks a fine needle biopsy is needed. Once the initial evaluation is completed, thyroid ultrasound can be used to keep an eye on thyroid nodules that do not require surgery to determine if they are growing or shrinking over time. The ultrasound is a painless test.    THYROID FINE NEEDLE ASPIRATION BIOPSY (FNA OR FNAB):  A fine needle biopsy of a thyroid nodule may sound frightening, but the needle used is very small and a local anesthetic may not even be necessary. This simple procedure is often done in the doctors office or by a radiologist using ultrasound. Sometimes, medications like blood thinners may need to be stopped for a few days before to the procedure. Otherwise, the biopsy does not usually require any other special preparation (no fasting). Patients typically return home or to work after the biopsy without even needing a band-aid! For a fine needle biopsy, your doctor will use a very thin needle to withdraw cells from the thyroid nodule. Ordinarily, several samples will be taken from different parts of the nodule to give your doctor the best chance of finding cancerous cells if they are present. The cells  are then examined under a microscope by a pathologist.    The report of a thyroid fine needle biopsy will usually indicate one of the following findings:    1. The nodule is benign (noncancerous).  This result is obtained in up to 80% of biopsies. The risk of overlooking a cancer when the biopsy is benign is generally less than 3 in 100 tests or 3% unless the nodule is very large. We know that patients with benign needle biopsies in nodules 3cm or larger actually have thyroid cancer about 13% of the time. Smaller benign thyroid nodules do not need to be removed unless they are causing symptoms like choking or difficulty swallowing. Follow up ultrasound exams are important. Occasionally, another biopsy may be required in the future, especially if the nodule grows over time.    2. The nodule is malignant (cancerous) or suspicious for malignancy .  malignant result is obtained in about 5% of biopsies and is most often due to papillary cancer, which is the most common type of thyroid cancer. A suspicious biopsy has a 50-75% risk of cancer in the nodule. These diagnoses require surgical removal of the thyroid    3. The nodule is indeterminate. This is actually a group of several diagnoses that may occur in up to 20% of cases. An Indeterminate finding means that even though an adequate number of cells was removed during the fine needle biopsy, examination with a microscope cannot reliably classify the result as benign or cancer.  The biopsy may be indeterminate because the nodule is described as a Follicular Lesion. These nodules are cancerous 20- 30% of the time. However, the diagnosis can only be made by surgery. Since the odds that the nodule is not a cancer are much better here (70-80%), only the side of the thyroid with the nodule is usually removed. If a cancer is found, the remaining thyroid gland usually must be removed as well. If the surgery confirms that no cancer is present, no additional surgery to complete  the thyroidectomy is necessary.    4. The biopsy may also be indeterminate because the cells from the nodule have features that cannot be placed in one of the other diagnostic categories. This diagnosis is called atypia, or a follicular lesion of undetermined significance. Diagnoses in this category will contain cancer rarely, so repeat evaluation with FNA or surgical biopsy to remove half of the thyroid containing the nodule is usually recommended.    5.  The biopsy may also be nondiagnostic or inadequate.   This result is obtained in less than 5% of cases when an ultrasound is used to guide the FNA. This result indicates that not enough cells were obtained to make a diagnosis but is a common result if the nodule is a cyst. These nodules may require reevaluation with second fine needle biopsy, or may need to be removed surgically depending on the clinical judgment of your doctor.        HOW ARE THYROID NODULES TREATED?  All thyroid nodules that are found to contain a thyroid cancer, or that are highly suspicious of containing a cancer, should be removed surgically by an experienced thyroid surgeon. Most thyroid cancers are curable and rarely cause life-threatening problems . Thyroid nodules that are benign by FNA or too small to biopsy should still be watched closely with ultrasound examination on a schedule your doctor will discuss with you. Surgery may still be recommended even for a nodule that is benign by FNA if it continues to grow, or develops worrisome features on ultrasound over the course of follow up.

## 2020-06-26 NOTE — LETTER
June 26, 2020      Christian Navarro MD  [address] [facility] [address]           [facility]  [address]  [place] [address]  Phone: [phone]  Fax: [phone]          Patient: Marcie Bazan   MR Number: [id number]   YOB: 1965   Date of Visit: 6/26/2020       Dear Dr. Christian Navarro:    Thank you for referring Marcie Bazan to me for evaluation. Attached you will find relevant portions of my assessment and plan of care.    If you have questions, please do not hesitate to call me. I look forward to following Marcie Bazan along with you.    Sincerely,    Jarrod Roberto MD    Enclosure  CC:  No Recipients    If you would like to receive this communication electronically, please contact [email or web address] or [phone] to request more information on [employer] access.    For providers and/or their staff who would like to refer a patient to [facility], please contact us through our one-stop-shop provider referral line, [facility], at [phone].    If you feel you have received this communication in error or would no longer like to receive these types of communications, please e-mail [email or web address]

## 2020-06-30 NOTE — PRE-PROCEDURE INSTRUCTIONS
Spoke with  patient regarding procedure scheduled on 7/7/2020  Arrival time NOT APPROVED  Has patient been sick with fever or on antibiotics within the last 7 days? No  Has patient received a vaccination within the last 7 days? No  Has the patient stopped all medications as directed? N/A  Does patient have a pacemaker and or defibrillator? no  Does the patient have a ride to and from procedure and someone reliable to remain with patient? yes  Is the patient diabetic? No  Does the patient have sleep apnea? No Or use O2 at home? No  Is the patient receiving sedation? yes  Is the patient instructed to remain NPO beginning at midnight the night before their procedure? yes  Procedure location confirmed with patient? [facility]  Covid testing: NOT APPROVED

## 2020-07-01 NOTE — PRE-PROCEDURE INSTRUCTIONS
Spoke with patient regarding procedure scheduled on 7/7/2020  Arrival time of 0850. Covid test scheduled for 7/4/2020 0930 at the [facility]

## 2020-07-02 ENCOUNTER — TELEPHONE (OUTPATIENT)
Dept: PAIN MEDICINE | Facility: CLINIC | Age: 55
End: 2020-07-02

## 2020-07-02 DIAGNOSIS — M79.641 RIGHT HAND PAIN: Primary | ICD-10-CM

## 2020-07-02 NOTE — TELEPHONE ENCOUNTER
----- Message from Park Caceres sent at 7/2/2020  1:16 PM CDT -----  Regarding: sooner appt  .Type:  Sooner Apoointment Request    Caller is requesting a sooner appointment.  Caller declined first available appointment listed below.  Caller will not accept being placed on the waitlist and is requesting a message be sent to doctor.  Name of Caller: pt  When is the first available appointment? 8/20  Symptoms: post op  Would the patient rather a call back or a response via [employer]?  Call back   Best Call Back Number: [phone] (home)   Additional Information:  pt request 7/21 (2wk post op)

## 2020-07-04 ENCOUNTER — LAB VISIT (OUTPATIENT)
Dept: URGENT CARE | Facility: CLINIC | Age: 55
End: 2020-07-04
Payer: MEDICAID

## 2020-07-04 DIAGNOSIS — Z03.818 ENCOUNTER FOR OBSERVATION FOR SUSPECTED EXPOSURE TO OTHER BIOLOGICAL AGENTS RULED OUT: ICD-10-CM

## 2020-07-04 PROCEDURE — U0003 INFECTIOUS AGENT DETECTION BY NUCLEIC ACID (DNA OR RNA); SEVERE ACUTE RESPIRATORY SYNDROME CORONAVIRUS 2 (SARS-COV-2) (CORONAVIRUS DISEASE [COVID-19]), AMPLIFIED PROBE TECHNIQUE, MAKING USE OF HIGH THROUGHPUT TECHNOLOGIES AS DESCRIBED BY CMS-2020-01-R: HCPCS

## 2020-07-05 LAB — SARS-COV-2 RNA RESP QL NAA+PROBE: NOT DETECTED

## 2020-07-06 ENCOUNTER — TELEPHONE (OUTPATIENT)
Dept: PAIN MEDICINE | Facility: CLINIC | Age: 55
End: 2020-07-06

## 2020-07-06 NOTE — TELEPHONE ENCOUNTER
----- Message from Nadira Davalos sent at 7/6/2020  1:14 PM CDT -----  Contact: Pt  Pt is calling to schedule her procedure follow up for 2 weeks and would like a callback today can be reached at [phone]//[email or web address]

## 2020-07-07 ENCOUNTER — HOSPITAL ENCOUNTER (OUTPATIENT)
Facility: HOSPITAL | Age: 55
Discharge: HOME | End: 2020-07-07
Attending: PAIN MEDICINE | Admitting: PAIN MEDICINE
Payer: MEDICAID

## 2020-07-07 VITALS
DIASTOLIC BLOOD PRESSURE: 56 MMHG | HEART RATE: 76 BPM | SYSTOLIC BLOOD PRESSURE: 115 MMHG | OXYGEN SATURATION: 100 % | HEIGHT: 65 IN | BODY MASS INDEX: 29.82 KG/M2 | RESPIRATION RATE: 17 BRPM | WEIGHT: 179 LBS | TEMPERATURE: 98 F

## 2020-07-07 DIAGNOSIS — M70.62 GREATER TROCHANTERIC BURSITIS OF BOTH HIPS: ICD-10-CM

## 2020-07-07 DIAGNOSIS — M46.1 SACROILIITIS: ICD-10-CM

## 2020-07-07 DIAGNOSIS — M70.61 GREATER TROCHANTERIC BURSITIS OF BOTH HIPS: ICD-10-CM

## 2020-07-07 DIAGNOSIS — M53.3 SACROILIAC JOINT DYSFUNCTION: Primary | ICD-10-CM

## 2020-07-07 PROCEDURE — 20610 PR DRAIN/INJECT LARGE JOINT/BURSA: ICD-10-PCS | Mod: 50,59,, | Performed by: PAIN MEDICINE

## 2020-07-07 PROCEDURE — 27096 PR INJECTION,SACROILIAC JOINT: ICD-10-PCS | Mod: 50,,, | Performed by: PAIN MEDICINE

## 2020-07-07 PROCEDURE — 20610 DRAIN/INJ JOINT/BURSA W/O US: CPT | Mod: 50 | Performed by: PAIN MEDICINE

## 2020-07-07 PROCEDURE — 25000003 PHARM REV CODE 250: Performed by: PAIN MEDICINE

## 2020-07-07 PROCEDURE — 99152 MOD SED SAME PHYS/QHP 5/>YRS: CPT | Performed by: PAIN MEDICINE

## 2020-07-07 PROCEDURE — 27096 INJECT SACROILIAC JOINT: CPT | Mod: 50,,, | Performed by: PAIN MEDICINE

## 2020-07-07 PROCEDURE — 20610 DRAIN/INJ JOINT/BURSA W/O US: CPT | Mod: 50,59,, | Performed by: PAIN MEDICINE

## 2020-07-07 PROCEDURE — 63600175 PHARM REV CODE 636 W HCPCS: Performed by: PAIN MEDICINE

## 2020-07-07 PROCEDURE — 25500020 PHARM REV CODE 255: Performed by: PAIN MEDICINE

## 2020-07-07 PROCEDURE — 27096 INJECT SACROILIAC JOINT: CPT | Mod: 50 | Performed by: PAIN MEDICINE

## 2020-07-07 RX ORDER — FENTANYL CITRATE 50 UG/ML
INJECTION, SOLUTION INTRAMUSCULAR; INTRAVENOUS
Status: DISCONTINUED | OUTPATIENT
Start: 2020-07-07 | End: 2020-07-07 | Stop reason: HOSPADM

## 2020-07-07 RX ORDER — LIDOCAINE HYDROCHLORIDE 20 MG/ML
INJECTION, SOLUTION EPIDURAL; INFILTRATION; INTRACAUDAL; PERINEURAL
Status: DISCONTINUED | OUTPATIENT
Start: 2020-07-07 | End: 2020-07-07 | Stop reason: HOSPADM

## 2020-07-07 RX ORDER — LIDOCAINE HYDROCHLORIDE 10 MG/ML
INJECTION, SOLUTION EPIDURAL; INFILTRATION; INTRACAUDAL; PERINEURAL
Status: DISCONTINUED | OUTPATIENT
Start: 2020-07-07 | End: 2020-07-07 | Stop reason: HOSPADM

## 2020-07-07 RX ORDER — METHYLPREDNISOLONE ACETATE 40 MG/ML
INJECTION, SUSPENSION INTRA-ARTICULAR; INTRALESIONAL; INTRAMUSCULAR; SOFT TISSUE
Status: DISCONTINUED | OUTPATIENT
Start: 2020-07-07 | End: 2020-07-07 | Stop reason: HOSPADM

## 2020-07-07 RX ORDER — MIDAZOLAM HYDROCHLORIDE 1 MG/ML
INJECTION, SOLUTION INTRAMUSCULAR; INTRAVENOUS
Status: DISCONTINUED | OUTPATIENT
Start: 2020-07-07 | End: 2020-07-07 | Stop reason: HOSPADM

## 2020-07-07 NOTE — DISCHARGE INSTRUCTIONS

## 2020-07-07 NOTE — OP NOTE
PROCEDURE: Sacroiliac joint and Greater trochanteric bursa injection under fluoroscopic guidance       SIDE: bilateral Sacroiliac injection and bilateral greater trochanteric bursa injection      PROCEDURE DATE: 7/7/2020    PREOPERATIVE DIAGNOSIS: Sacroiliitis, greater trochanteric bursitis  POSTOPERATIVE DIAGNOSIS: Sacroiliitis, greater trochanteric bursitis    PROVIDER: KATJA Desai MD  Assistant(s): none    Anesthesia: Local, IV Sedation     >> 1 mg of VERSED    >> 50 mcg of FENTANYL     INDICATION: The patient has a history of pain due to sacroiliitis unresponsive to conservative treatments. Fluoroscopy was used to optimize visualization of needle placement and to maximize safety.       PROCEDURE DESCRIPTION: The patient was seen and identified in the preoperative area. Risks, benefits, complications, and alternatives were discussed with the patient. The patient agreed to proceed with the procedure and signed the consent. The site and side of the procedure was identified and marked.     The patient was taken to the procedural suite. The patient was positioned in prone orientation on procedure table. A time out was performed prior to any intervention. The procedure, site, side, and allergies were stated and agreed to by all present. The lumbosacral area extending to the skin overlying the femoral greater trochanter was widely prepped with ChloraPrep. The procedural site was draped in usual sterile fashion. Vital signs were closely monitored throughout this procedure.     The fluoroscopic camera was placed in contralateral oblique view and was adjusted until the anterior and posterior joint margins of the targeted sacroiliac joint aligned in linear array. The lower pole of the joint was identified, marked, and localized with 1% Lidocaine. A 25 gauge 3.5 inch spinal needle was introduced and advanced to the joint under fluoroscopic guidance. The joint space was entered and after negative aspiration, 2 mL of  injectate was posited into the joint space. The injectant solution used was comprised of 6 mL of 2% lidocaine and 2 mL of Methylprednisolone (40 mg/mL). This techniques was performed for the above noted joint/s.    Following Sacroiliac Joint injection, the stylet was replaced and the needle was withdrawn intact. The RIGHT greater trochanter was then identified with fluoroscopy. The targeted site of entry was localized with 1% PF Lidocaine. The above noted spinal needle was advanced to the lateral border of the greater trochanter until the osseus interface was met.  After negative aspiration, 3 mL of the above noted solution was injected. No pain or paresthesia was noted on injection. Following injection, the stylet was replaced and the needle was withdrawn intact. This technique was used for the above noted greater trochanteric bursa / bursae. The skin was cleaned and bandages were applied.    Description of Findings: Not applicable    Prosthetic devices, grafts, tissues, or devices implanted: None    Specimen Removed: No    Estimated Blood Loss: minimal    COMPLICATIONS: None    DISPOSITION / PLANS: The patient was transferred to the recovery area in a stable condition for observation. The patient was reexamined prior to discharge. There was no evidence of acute neurologic injury following the procedure.  Patient was discharged from the recovery room after meeting discharge criteria. Home discharge instructions were given to the patient by the staff.
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
## 2020-07-08 NOTE — DISCHARGE SUMMARY
[facility]  Short Stay  Discharge Summary    Admit Date: 7/7/2020    Discharge Date and Time: 7/7/2020 10:42 AM      Discharge Attending Physician: KATJA Desai MD     Hospital Course (synopsis of major diagnoses, care, treatment, and services provided during the course of the hospital stay): Patient was admitted to Pre-op where informed consent was signed.  The patient was then taken to the procedure suite where the procedure was performed.  The patient was then return to the Pre-Op area and discharge was performed.     Final Diagnoses:    Principal Problem: <principal problem not specified>   Secondary Diagnoses:   Active Hospital Problems    Diagnosis  POA    Sacroiliitis [M46.1]  Yes      Resolved Hospital Problems   No resolved problems to display.       Discharged Condition: good    Disposition: Home or Self Care    Follow up/Patient Instructions:    Medications:  Reconciled Home Medications:      Medication List      CONTINUE taking these medications    atorvastatin 40 MG tablet  Commonly known as: LIPITOR  Take 1 tablet (40 mg total) by mouth every evening.     BIOTIN ORAL  Take by mouth every evening.     cyclobenzaprine 10 MG tablet  Commonly known as: FLEXERIL  TAKE 1/2 TO 1 TABLET BY MOUTH EVERY NIGHT AT BEDTIME AS NEEDED FOR MUSCLE SPASMS. MAY CAUSE DROWSINESS     ergocalciferol 50,000 unit Cap  Commonly known as: ERGOCALCIFEROL  TAKE 1 CAPSULE BY MOUTH EVERY 7 DAYS     famotidine 20 MG tablet  Commonly known as: PEPCID  TAKE 1 TABLET(20 MG) BY MOUTH TWICE DAILY     furosemide 40 MG tablet  Commonly known as: LASIX  TAKE 1 TABLET BY MOUTH DAILY     * gabapentin 300 MG capsule  Commonly known as: NEURONTIN  Take 3 capsules (900 mg total) by mouth 2 (two) times daily.     * gabapentin 600 MG tablet  Commonly known as: NEURONTIN  Take 1 tablet (600 mg total) by mouth 3 (three) times daily.     * gabapentin 300 MG capsule  Commonly known as: NEURONTIN  Take 1 capsule (300 mg total) by  mouth 3 (three) times daily. To take with 600mg to equal 900mg dose.     levocetirizine 5 MG tablet  Commonly known as: XYZAL  TAKE 1 TABLET(5 MG) BY MOUTH EVERY EVENING     magnesium oxide 400 mg (241.3 mg magnesium) tablet  Commonly known as: MAG-OX  TAKE 2 TABLETS BY MOUTH TWICE DAILY.     mycophenolate 250 mg Cap  Commonly known as: CELLCEPT     NIFEdipine 30 MG (OSM) 24 hr tablet  Commonly known as: PROCARDIA-XL  Take 1 tablet (30 mg total) by mouth once daily.     ondansetron 4 MG Tbdl  Commonly known as: ZOFRAN-ODT  DISSOLVE 1 TO 2 TABLETS ON THE TONGUE THREE TIMES DAILY AS NEEDED FOR NAUSEA     potassium chloride 10 MEQ Tbsr  Commonly known as: KLOR-CON  TAKE 2 TABLETS BY MOUTH DAILY.     STOOL SOFTENER-LAXATIVE 8.6-50 mg per tablet  Generic drug: senna-docusate 8.6-50 mg  Take 1 tablet by mouth 2 (two) times daily. Take while taking narcotic pain medications     tacrolimus 1 MG Cap  Commonly known as: PROGRAF  Take 2 capsules (2 mg total) by mouth every morning AND 1 capsule (1 mg total) every evening.     VOLTAREN 1 % Gel  Generic drug: diclofenac sodium  Apply 2 g topically 3 (three) times daily as needed.         * This list has 3 medication(s) that are the same as other medications prescribed for you. Read the directions carefully, and ask your doctor or other care provider to review them with you.              Discharge Procedure Orders   Diet general     Call MD for:  severe uncontrolled pain     Call MD for:  difficulty breathing, headache or visual disturbances     Call MD for:  redness, tenderness, or signs of infection (pain, swelling, redness, odor or green/yellow discharge around incision site)     Activity as tolerated

## 2020-07-15 ENCOUNTER — PROCEDURE VISIT (OUTPATIENT)
Dept: SLEEP MEDICINE | Facility: CLINIC | Age: 55
End: 2020-07-15
Payer: MEDICAID

## 2020-07-15 DIAGNOSIS — R06.83 PRIMARY SNORING: Primary | ICD-10-CM

## 2020-07-15 DIAGNOSIS — G47.33 OSA (OBSTRUCTIVE SLEEP APNEA): ICD-10-CM

## 2020-07-15 PROCEDURE — 95800 PR SLEEP STUDY, UNATTENDED, RECORD HEART RATE/O2 SAT/RESP ANAL/SLEEP TIME: ICD-10-PCS | Mod: 26,S$PBB,, | Performed by: INTERNAL MEDICINE

## 2020-07-15 PROCEDURE — 95800 SLP STDY UNATTENDED: CPT | Mod: PBBFAC | Performed by: INTERNAL MEDICINE

## 2020-07-15 PROCEDURE — 95800 SLP STDY UNATTENDED: CPT | Mod: 26,S$PBB,, | Performed by: INTERNAL MEDICINE

## 2020-07-15 NOTE — PROCEDURES
Home Sleep Studies    Date/Time: 7/15/2020 8:00 AM  Performed by: Chandra Toney MD  Authorized by: Jarrod Roberto MD       PHYSICIAN INTERPRETATION AND COMMENTS: Findings are consistent with primary snoring. very mild sleep apnea  only by RDI criteria. RDI was 13.0 events per hour. AHI was 4.0 events per hour. Oxygen saturation shayla was 86.1%. Loud  snoring. Surgical procedures to ameliorate snoring may be considered. If suspicion for sleep disorder breathing is sufficient then  repeat testing is indicated. Weight loss also indicated.  CLINICAL HISTORY: 54 year old female presented with: 13 inch neck, BMI of 30, an [place] sleepiness score of 12, history of  hypertension and symptoms of nocturnal snoring, waking up choking and witnessed apneas. Based on the clinical history, the patient  has a high pre-test probability of having mild BRANNON. Stop Bang score 5.

## 2020-07-15 NOTE — Clinical Note
PHYSICIAN INTERPRETATION AND COMMENTS: Findings are consistent with primary snoring. very mild sleep apnea  only by RDI criteria. RDI was 13.0 events per hour. AHI was 4.0 events per hour. Oxygen saturation shayla was 86.1%. Loud  snoring. Surgical procedures to ameliorate snoring may be considered. If suspicion for sleep disorder breathing is sufficient then  repeat testing is indicated. Weight loss also indicated.  CLINICAL HISTORY: 54 year old female presented with: 13 inch neck, BMI of 30, an [place] sleepiness score of 12, history of  hypertension and symptoms of nocturnal snoring, waking up choking and witnessed apneas. Based on the clinical history, the patient  has a high pre-test probability of having mild BRANNON. Stop Bang score 5.

## 2020-07-16 ENCOUNTER — PATIENT MESSAGE (OUTPATIENT)
Dept: OTOLARYNGOLOGY | Facility: CLINIC | Age: 55
End: 2020-07-16

## 2020-07-20 ENCOUNTER — TELEPHONE (OUTPATIENT)
Dept: PULMONOLOGY | Facility: CLINIC | Age: 55
End: 2020-07-20

## 2020-07-20 NOTE — TELEPHONE ENCOUNTER
----- Message from Nomi Ash sent at 7/16/2020  7:53 AM CDT -----  Need new patient f/u from Home Sleep Study.    Thanks

## 2020-07-20 NOTE — TELEPHONE ENCOUNTER
Patient opted for the following appointment:    Future Appointments   Date Time Provider Department Center   7/22/2020 10:40 AM Ronald Aldana OD [facility] [place]   8/4/2020  8:40 AM Emilee Buitrago PA-C [facility]   9/3/2020  8:00 AM Chandra Toney MD [facility] SLEEP [facility] [place]   10/28/2020  8:30 AM Christian Navarro MD [facility] [place]       Patient verbalized understanding of appointment date, time, and clinic location.

## 2020-07-21 ENCOUNTER — PATIENT OUTREACH (OUTPATIENT)
Dept: ADMINISTRATIVE | Facility: OTHER | Age: 55
End: 2020-07-21

## 2020-07-21 NOTE — PROGRESS NOTES
Requested updates within Care Everywhere.  Patient's chart was reviewed for overdue ALANNA topics.  Immunizations reconciled.

## 2020-07-22 ENCOUNTER — OFFICE VISIT (OUTPATIENT)
Dept: OPHTHALMOLOGY | Facility: CLINIC | Age: 55
End: 2020-07-22
Payer: MEDICAID

## 2020-07-22 DIAGNOSIS — H52.4 PRESBYOPIA: ICD-10-CM

## 2020-07-22 DIAGNOSIS — H52.213 IRREGULAR ASTIGMATISM OF BOTH EYES: Primary | ICD-10-CM

## 2020-07-22 PROCEDURE — 99213 OFFICE O/P EST LOW 20 MIN: CPT | Mod: PBBFAC | Performed by: OPTOMETRIST

## 2020-07-22 PROCEDURE — 92014 COMPRE OPH EXAM EST PT 1/>: CPT | Mod: S$PBB,,, | Performed by: OPTOMETRIST

## 2020-07-22 PROCEDURE — 99999 PR PBB SHADOW E&M-EST. PATIENT-LVL III: CPT | Mod: PBBFAC,,, | Performed by: OPTOMETRIST

## 2020-07-22 PROCEDURE — 92014 PR EYE EXAM, EST PATIENT,COMPREHESV: ICD-10-PCS | Mod: S$PBB,,, | Performed by: OPTOMETRIST

## 2020-07-22 PROCEDURE — 92025 CPTRIZED CORNEAL TOPOGRAPHY: CPT | Mod: PBBFAC | Performed by: OPTOMETRIST

## 2020-07-22 PROCEDURE — 92015 PR REFRACTION: ICD-10-PCS | Mod: ,,, | Performed by: OPTOMETRIST

## 2020-07-22 PROCEDURE — 99999 PR PBB SHADOW E&M-EST. PATIENT-LVL III: ICD-10-PCS | Mod: PBBFAC,,, | Performed by: OPTOMETRIST

## 2020-07-22 PROCEDURE — 92025 COMPUTERIZED CORNEAL TOPOGRAPHY: ICD-10-PCS | Mod: 26,S$PBB,, | Performed by: OPTOMETRIST

## 2020-07-22 PROCEDURE — 92015 DETERMINE REFRACTIVE STATE: CPT | Mod: ,,, | Performed by: OPTOMETRIST

## 2020-07-22 NOTE — PROGRESS NOTES
HPI     Blurred Vision     Comments: Yearly              Comments     Patient last visit with [facility] on 04/01/2019.  Liver transplant   HPI    Any vision changes since last exam: Yes, decrease with overall vision  Eye pain: No  Other ocular symptoms: No    Do you wear currently wear glasses or contacts? Glasses    Interested in contacts today? No    Do you plan on getting new glasses today? Yes                  Last edited by Sophia Montano on 7/22/2020 10:54 AM. (History)            Assessment /Plan     For exam results, see Encounter Report.    Irregular astigmatism of both eyes    Presbyopia      Asymmetric K's OD, OS with mild irregularity   Very slow refraction responses  Will recheck dry refraction undilated prior to rx specs    RTC for EPRx undilated refraction with mOCT, next available  Discussed above and all questions were answered.

## 2020-07-27 ENCOUNTER — TELEPHONE (OUTPATIENT)
Dept: INTERNAL MEDICINE | Facility: CLINIC | Age: 55
End: 2020-07-27

## 2020-07-27 DIAGNOSIS — E55.9 VITAMIN D DEFICIENCY: ICD-10-CM

## 2020-07-27 RX ORDER — ERGOCALCIFEROL 1.25 MG/1
CAPSULE ORAL
Qty: 12 CAPSULE | Refills: 0 | Status: SHIPPED | OUTPATIENT
Start: 2020-07-27 | End: 2020-11-03 | Stop reason: SDUPTHER

## 2020-07-28 NOTE — TELEPHONE ENCOUNTER
----- Message from Meseret Doshi sent at 7/27/2020  8:11 AM CDT -----  Regarding: Patient  The patient has stated that she left a message on Thursday and hasn't heard back from anybody. She has a sore throat which has gotten worse and a bad cough. Please call back at [phone] (home) [phone] (work)

## 2020-07-29 ENCOUNTER — OFFICE VISIT (OUTPATIENT)
Dept: INTERNAL MEDICINE | Facility: CLINIC | Age: 55
End: 2020-07-29
Payer: MEDICAID

## 2020-07-29 ENCOUNTER — TELEPHONE (OUTPATIENT)
Dept: INTERNAL MEDICINE | Facility: CLINIC | Age: 55
End: 2020-07-29

## 2020-07-29 DIAGNOSIS — I10 ESSENTIAL HYPERTENSION: Chronic | ICD-10-CM

## 2020-07-29 DIAGNOSIS — Z94.4 LIVER REPLACED BY TRANSPLANT: ICD-10-CM

## 2020-07-29 DIAGNOSIS — M47.26 OSTEOARTHRITIS OF SPINE WITH RADICULOPATHY, LUMBAR REGION: ICD-10-CM

## 2020-07-29 DIAGNOSIS — Z20.822 SUSPECTED COVID-19 VIRUS INFECTION: Primary | ICD-10-CM

## 2020-07-29 DIAGNOSIS — R06.2 WHEEZING: ICD-10-CM

## 2020-07-29 DIAGNOSIS — R05.9 COUGH: ICD-10-CM

## 2020-07-29 DIAGNOSIS — J30.1 SEASONAL ALLERGIC RHINITIS DUE TO POLLEN: ICD-10-CM

## 2020-07-29 PROBLEM — M76.32 IT BAND SYNDROME, LEFT: Status: RESOLVED | Noted: 2019-01-23 | Resolved: 2020-07-29

## 2020-07-29 PROBLEM — M54.41 CHRONIC BILATERAL LOW BACK PAIN WITH SCIATICA: Status: RESOLVED | Noted: 2019-04-17 | Resolved: 2020-07-29

## 2020-07-29 PROBLEM — G89.29 CHRONIC BILATERAL LOW BACK PAIN WITH SCIATICA: Status: RESOLVED | Noted: 2019-04-17 | Resolved: 2020-07-29

## 2020-07-29 PROBLEM — M62.81 MUSCLE WEAKNESS (GENERALIZED): Status: RESOLVED | Noted: 2019-04-15 | Resolved: 2020-07-29

## 2020-07-29 PROBLEM — R29.898 WEAKNESS OF BOTH HIPS: Status: RESOLVED | Noted: 2019-02-05 | Resolved: 2020-07-29

## 2020-07-29 PROBLEM — M79.604 RIGHT LEG PAIN: Status: RESOLVED | Noted: 2019-01-23 | Resolved: 2020-07-29

## 2020-07-29 PROBLEM — M70.62 GREATER TROCHANTERIC BURSITIS OF BOTH HIPS: Status: RESOLVED | Noted: 2019-02-05 | Resolved: 2020-07-29

## 2020-07-29 PROBLEM — M46.1 SACROILIITIS: Status: RESOLVED | Noted: 2020-07-07 | Resolved: 2020-07-29

## 2020-07-29 PROBLEM — M53.3 SI (SACROILIAC) PAIN: Status: RESOLVED | Noted: 2019-01-23 | Resolved: 2020-07-29

## 2020-07-29 PROBLEM — R26.2 DIFFICULTY WALKING: Status: RESOLVED | Noted: 2019-04-15 | Resolved: 2020-07-29

## 2020-07-29 PROBLEM — R63.4 UNINTENTIONAL WEIGHT LOSS OF 10% BODY WEIGHT WITHIN 6 MONTHS: Status: RESOLVED | Noted: 2020-03-26 | Resolved: 2020-07-29

## 2020-07-29 PROBLEM — M54.40 CHRONIC BILATERAL LOW BACK PAIN WITH SCIATICA: Status: RESOLVED | Noted: 2019-04-17 | Resolved: 2020-07-29

## 2020-07-29 PROBLEM — M70.61 GREATER TROCHANTERIC BURSITIS OF BOTH HIPS: Status: RESOLVED | Noted: 2019-02-05 | Resolved: 2020-07-29

## 2020-07-29 PROBLEM — M54.42 CHRONIC BILATERAL LOW BACK PAIN WITH SCIATICA: Status: RESOLVED | Noted: 2019-04-17 | Resolved: 2020-07-29

## 2020-07-29 PROBLEM — R06.09 DYSPNEA ON EXERTION: Status: RESOLVED | Noted: 2020-03-26 | Resolved: 2020-07-29

## 2020-07-29 PROCEDURE — 99214 PR OFFICE/OUTPT VISIT, EST, LEVL IV, 30-39 MIN: ICD-10-PCS | Mod: 95,,, | Performed by: FAMILY MEDICINE

## 2020-07-29 PROCEDURE — 99214 OFFICE O/P EST MOD 30 MIN: CPT | Mod: 95,,, | Performed by: FAMILY MEDICINE

## 2020-07-29 RX ORDER — ALBUTEROL SULFATE 90 UG/1
2 AEROSOL, METERED RESPIRATORY (INHALATION) EVERY 6 HOURS PRN
Qty: 18 G | Refills: 0 | Status: SHIPPED | OUTPATIENT
Start: 2020-07-29 | End: 2020-08-20

## 2020-07-29 RX ORDER — PROMETHAZINE HYDROCHLORIDE AND DEXTROMETHORPHAN HYDROBROMIDE 6.25; 15 MG/5ML; MG/5ML
5 SYRUP ORAL NIGHTLY PRN
Qty: 118 ML | Refills: 0 | Status: SHIPPED | OUTPATIENT
Start: 2020-07-29 | End: 2020-08-08

## 2020-07-29 NOTE — PROGRESS NOTES
The patient location is: Home  The chief complaint leading to consultation is:  Cough    Visit type: audiovisual    Face to Face time with patient: 15 minutes of total time spent on the encounter, which includes face to face time and non-face to face time preparing to see the patient (eg, review of tests), Obtaining and/or reviewing separately obtained history, Documenting clinical information in the electronic or other health record, Independently interpreting results (not separately reported) and communicating results to the patient/family/caregiver, or Care coordination (not separately reported).         Each patient to whom he or she provides medical services by telemedicine is:  (1) informed of the relationship between the physician and patient and the respective role of any other health care provider with respect to management of the patient; and (2) notified that he or she may decline to receive medical services by telemedicine and may withdraw from such care at any time.    Notes:     Subjective:       Patient ID: Marcie Bazan is a 54 y.o. female.    Chief Complaint: No chief complaint on file.    54-year-old  female patient with Patient Active Problem List:     Allergic rhinitis     Anemia of chronic disease     Erosive esophagitis     Essential hypertension     History of abnormal cervical Pap smear     History of Helicobacter pylori infection     Lipoma     Multinodular thyroid     Hypomagnesemia     Liver replaced by transplant     Adrenal cortical steroids causing adverse effect in therapeutic use     At risk for opportunistic infections     Long-term use of immunosuppressant medication     Accelerated liver transplant rejection     Vitamin D deficiency     Mixed hyperlipidemia     Adhesive capsulitis of both shoulders     Piriformis syndrome of both sides     Spondylosis of lumbar region without myelopathy or radiculopathy     Obesity (BMI 30.0-34.9)     Sacroiliac joint  dysfunction  Reports that she has been having productive cough and congestion off and on since Thursday, denies any fever but has been having headaches .  Patient reported that she got COVID testing done at home, including all her family members as recommended by her daughter, someone came in home and did COVID testing on Saturday and waiting for the test results.   Patient has been having discomfort with breathing and occasionally has been having wheezing, denies any respiratory problems  Patient being transplant patient had COVID testing done with [facility] few weeks ago which was negative.   Denies any chest pain or palpitations    Sore Throat   This is a new problem. The current episode started 1 to 4 weeks ago. The problem has been waxing and waning. The pain is worse on the left side. There has been no fever. The pain is severe. Associated symptoms include congestion, coughing, headaches, a hoarse voice and shortness of breath. Pertinent negatives include no drooling, ear pain or stridor. She has had no exposure to strep or mono. She has tried gargles for the symptoms. The treatment provided mild relief.     Review of Systems   Constitutional: Positive for fatigue. Negative for chills and fever.   HENT: Positive for congestion, hoarse voice and sore throat. Negative for drooling and ear pain.    Respiratory: Positive for cough, shortness of breath and wheezing. Negative for stridor.    Musculoskeletal: Positive for myalgias.   Neurological: Positive for headaches.         LMP 01/01/1985 (Approximate) Comment: 1985  Objective:      Physical Exam  Pulmonary:      Effort: No respiratory distress.   Neurological:      Mental Status: She is alert and oriented to person, place, and time.   Psychiatric:         Mood and Affect: Mood normal.           Assessment/Plan:   1. Suspected Covid-19 Virus Infection  - promethazine-dextromethorphan (PROMETHAZINE-DM) 6.25-15 mg/5 mL Syrp; Take 5 mLs by mouth nightly as needed.   Dispense: 118 mL; Refill: 0  - albuterol (PROVENTIL/VENTOLIN HFA) 90 mcg/actuation inhaler; Inhale 2 puffs into the lungs every 6 (six) hours as needed for Wheezing. Rescue  Dispense: 18 g; Refill: 0  2. Wheezing  - albuterol (PROVENTIL/VENTOLIN HFA) 90 mcg/actuation inhaler; Inhale 2 puffs into the lungs every 6 (six) hours as needed for Wheezing. Rescue  Dispense: 18 g; Refill: 0  3. Cough  - promethazine-dextromethorphan (PROMETHAZINE-DM) 6.25-15 mg/5 mL Syrp; Take 5 mLs by mouth nightly as needed.  Dispense: 118 mL; Refill: 0    Patient was advised to isolate herself until she gets test results from her recent COVID testing done outside the Saturday  Patient was advised to try taking albuterol inhaler as needed for wheezing and promethazine DM for cough  If symptoms continue to persist or worsen patient may go to ER    4. Essential hypertension  Reports stable blood pressure    5. Seasonal allergic rhinitis due to pollen  Currently taking Xyzal    6. Osteoarthritis of spine with radiculopathy, lumbar region    7. Liver replaced by transplant

## 2020-07-29 NOTE — TELEPHONE ENCOUNTER
----- Message from Brandie Schneider sent at 7/29/2020 11:24 AM CDT -----  Regarding: Appt  Contact: [phone]  Would like to consult with the nurse, patient is not able to check in for her Appt , please call back at

## 2020-08-03 ENCOUNTER — TELEPHONE (OUTPATIENT)
Dept: PAIN MEDICINE | Facility: CLINIC | Age: 55
End: 2020-08-03

## 2020-08-03 ENCOUNTER — PATIENT OUTREACH (OUTPATIENT)
Dept: ADMINISTRATIVE | Facility: OTHER | Age: 55
End: 2020-08-03

## 2020-08-04 ENCOUNTER — OFFICE VISIT (OUTPATIENT)
Dept: PAIN MEDICINE | Facility: CLINIC | Age: 55
End: 2020-08-04
Payer: MEDICAID

## 2020-08-04 ENCOUNTER — TELEPHONE (OUTPATIENT)
Dept: PAIN MEDICINE | Facility: CLINIC | Age: 55
End: 2020-08-04

## 2020-08-04 ENCOUNTER — TELEPHONE (OUTPATIENT)
Dept: INTERNAL MEDICINE | Facility: CLINIC | Age: 55
End: 2020-08-04

## 2020-08-04 VITALS
HEIGHT: 65 IN | WEIGHT: 198.88 LBS | BODY MASS INDEX: 33.13 KG/M2 | SYSTOLIC BLOOD PRESSURE: 140 MMHG | HEART RATE: 76 BPM | DIASTOLIC BLOOD PRESSURE: 91 MMHG

## 2020-08-04 DIAGNOSIS — M70.61 GREATER TROCHANTERIC BURSITIS OF BOTH HIPS: ICD-10-CM

## 2020-08-04 DIAGNOSIS — M51.36 DEGENERATIVE DISC DISEASE, LUMBAR: ICD-10-CM

## 2020-08-04 DIAGNOSIS — M70.62 GREATER TROCHANTERIC BURSITIS OF BOTH HIPS: ICD-10-CM

## 2020-08-04 DIAGNOSIS — M54.16 LUMBAR RADICULOPATHY: ICD-10-CM

## 2020-08-04 DIAGNOSIS — M46.1 SACROILIITIS: Primary | ICD-10-CM

## 2020-08-04 PROCEDURE — 99999 PR PBB SHADOW E&M-EST. PATIENT-LVL IV: ICD-10-PCS | Mod: PBBFAC,,, | Performed by: PHYSICIAN ASSISTANT

## 2020-08-04 PROCEDURE — 99214 OFFICE O/P EST MOD 30 MIN: CPT | Mod: PBBFAC | Performed by: PHYSICIAN ASSISTANT

## 2020-08-04 PROCEDURE — 99214 PR OFFICE/OUTPT VISIT, EST, LEVL IV, 30-39 MIN: ICD-10-PCS | Mod: S$PBB,,, | Performed by: PHYSICIAN ASSISTANT

## 2020-08-04 PROCEDURE — 99214 OFFICE O/P EST MOD 30 MIN: CPT | Mod: S$PBB,,, | Performed by: PHYSICIAN ASSISTANT

## 2020-08-04 PROCEDURE — 99999 PR PBB SHADOW E&M-EST. PATIENT-LVL IV: CPT | Mod: PBBFAC,,, | Performed by: PHYSICIAN ASSISTANT

## 2020-08-04 RX ORDER — METHOCARBAMOL 750 MG/1
750 TABLET, FILM COATED ORAL 2 TIMES DAILY PRN
Qty: 60 TABLET | Refills: 0 | Status: SHIPPED | OUTPATIENT
Start: 2020-08-04 | End: 2020-08-12

## 2020-08-04 NOTE — TELEPHONE ENCOUNTER
Informed pt she can come inside for her appt . Pt understood. All questions answered.   Alexis Boyd,MA [facility]
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
## 2020-08-04 NOTE — TELEPHONE ENCOUNTER
----- Message from Ellie Arellano sent at 8/4/2020  8:26 AM CDT -----  Contact: pt  Type:  Patient Returning Call    Who Called:Marcie  Who Left Message for Patient:  Does the patient know what this is regarding?:  Would the patient rather a call back or a response via [facility]? call  Best Call Back Number:[phone]  Additional Information:

## 2020-08-04 NOTE — TELEPHONE ENCOUNTER
----- Message from Cathy Winkler sent at 8/4/2020  2:05 PM CDT -----  Regarding: sooner appt  Contact: pt [phone]  Type:  Sooner Apoointment Request    Caller is requesting a sooner appointment.  Caller declined first available appointment listed below.  Caller will not accept being placed on the waitlist and is requesting a message be sent to doctor.  Name of Caller:Marcie  When is the first available appointment?10/2020  Symptoms:body is blown up pt states it feels like she is about to explode  Would the patient rather a call back or a response via [facility]? Call back  Best Call Back Number: [phone]  Additional Information:

## 2020-08-12 ENCOUNTER — TELEPHONE (OUTPATIENT)
Dept: INTERNAL MEDICINE | Facility: CLINIC | Age: 55
End: 2020-08-12

## 2020-08-12 DIAGNOSIS — Z94.4 LIVER REPLACED BY TRANSPLANT: Primary | ICD-10-CM

## 2020-08-12 NOTE — TELEPHONE ENCOUNTER
Spoke with pt. Scheduled pt for 09/11/20 at 11am with Dr. Munoz. Pt acknowledged understanding and was thankful for the call back. Call ended well.

## 2020-08-12 NOTE — TELEPHONE ENCOUNTER
----- Message from Adwoa Crockett sent at 8/12/2020  3:13 PM CDT -----  Would like to consult with nurse regarding messed appt, needs help rs. Please give a call back at [phone].

## 2020-08-20 ENCOUNTER — TELEPHONE (OUTPATIENT)
Dept: PULMONOLOGY | Facility: CLINIC | Age: 55
End: 2020-08-20

## 2020-08-20 NOTE — TELEPHONE ENCOUNTER
----- Message from Brandie Schneider sent at 8/20/2020  1:51 PM CDT -----  Regarding: Appt  Contact: [phone]  Would like to consult with the nurse, patient would like to speak with the nurse concerning rescheduling her Appt , please call back thanks sj

## 2020-08-28 ENCOUNTER — PATIENT MESSAGE (OUTPATIENT)
Dept: PAIN MEDICINE | Facility: CLINIC | Age: 55
End: 2020-08-28

## 2020-08-31 ENCOUNTER — TELEPHONE (OUTPATIENT)
Dept: PAIN MEDICINE | Facility: CLINIC | Age: 55
End: 2020-08-31

## 2020-08-31 RX ORDER — CYCLOBENZAPRINE HCL 10 MG
TABLET ORAL
Qty: 60 TABLET | Refills: 0 | Status: SHIPPED | OUTPATIENT
Start: 2020-08-31 | End: 2020-10-08 | Stop reason: SDUPTHER

## 2020-09-01 ENCOUNTER — OFFICE VISIT (OUTPATIENT)
Dept: OPHTHALMOLOGY | Facility: CLINIC | Age: 55
End: 2020-09-01
Payer: MEDICAID

## 2020-09-01 DIAGNOSIS — H04.123 DRY EYES, BILATERAL: Primary | ICD-10-CM

## 2020-09-01 DIAGNOSIS — H52.213 IRREGULAR ASTIGMATISM OF BOTH EYES: ICD-10-CM

## 2020-09-01 DIAGNOSIS — H52.4 PRESBYOPIA: ICD-10-CM

## 2020-09-01 PROCEDURE — 99499 NO LOS: ICD-10-PCS | Mod: S$PBB,,, | Performed by: OPTOMETRIST

## 2020-09-01 PROCEDURE — 99999 PR PBB SHADOW E&M-EST. PATIENT-LVL III: CPT | Mod: PBBFAC,,, | Performed by: OPTOMETRIST

## 2020-09-01 PROCEDURE — 99499 UNLISTED E&M SERVICE: CPT | Mod: S$PBB,,, | Performed by: OPTOMETRIST

## 2020-09-01 PROCEDURE — 99213 OFFICE O/P EST LOW 20 MIN: CPT | Mod: PBBFAC | Performed by: OPTOMETRIST

## 2020-09-01 PROCEDURE — 99999 PR PBB SHADOW E&M-EST. PATIENT-LVL III: ICD-10-PCS | Mod: PBBFAC,,, | Performed by: OPTOMETRIST

## 2020-09-01 NOTE — PROGRESS NOTES
HPI     PT was last seen on 7/22/20 with DNL. PT was told to rtc for refraction   and mOCT    Last edited by Ailyn Peña MA on 9/1/2020  1:31 PM. (History)            Assessment /Plan     For exam results, see Encounter Report.    Dry eyes, bilateral  Begin Theratears bid OU    Irregular astigmatism of both eyes  Presbyopia  Eyeglass Final Rx     Eyeglass Final Rx       Sphere Add    Right -0.25 +2.50    Left -0.25 +2.50    Expiration Date: 9/2/2021              mOCT was normal today OU  RTC 1 yr for dilated eye exam or PRN if any problems.   Discussed above and answered questions.

## 2020-09-11 ENCOUNTER — OFFICE VISIT (OUTPATIENT)
Dept: INTERNAL MEDICINE | Facility: CLINIC | Age: 55
End: 2020-09-11
Payer: MEDICAID

## 2020-09-11 VITALS
SYSTOLIC BLOOD PRESSURE: 124 MMHG | HEART RATE: 95 BPM | BODY MASS INDEX: 33.42 KG/M2 | WEIGHT: 200.81 LBS | OXYGEN SATURATION: 99 % | DIASTOLIC BLOOD PRESSURE: 84 MMHG | TEMPERATURE: 98 F

## 2020-09-11 DIAGNOSIS — Z23 NEED FOR INFLUENZA VACCINATION: ICD-10-CM

## 2020-09-11 DIAGNOSIS — G47.10 HYPERSOMNIA: Chronic | ICD-10-CM

## 2020-09-11 DIAGNOSIS — Z23 NEED FOR 23-POLYVALENT PNEUMOCOCCAL POLYSACCHARIDE VACCINE: ICD-10-CM

## 2020-09-11 DIAGNOSIS — F43.0 ACUTE STRESS REACTION: ICD-10-CM

## 2020-09-11 DIAGNOSIS — E66.9 CLASS 1 OBESITY WITH SERIOUS COMORBIDITY AND BODY MASS INDEX (BMI) OF 33.0 TO 33.9 IN ADULT, UNSPECIFIED OBESITY TYPE: Chronic | ICD-10-CM

## 2020-09-11 DIAGNOSIS — Z00.01 ENCOUNTER FOR WELL ADULT EXAM WITH ABNORMAL FINDINGS: Primary | ICD-10-CM

## 2020-09-11 DIAGNOSIS — I10 ESSENTIAL HYPERTENSION: Chronic | ICD-10-CM

## 2020-09-11 PROBLEM — E66.811 CLASS 1 OBESITY WITH SERIOUS COMORBIDITY AND BODY MASS INDEX (BMI) OF 33.0 TO 33.9 IN ADULT: Chronic | Status: ACTIVE | Noted: 2019-02-05

## 2020-09-11 PROCEDURE — 99396 PR PREVENTIVE VISIT,EST,40-64: ICD-10-PCS | Mod: S$PBB,,, | Performed by: FAMILY MEDICINE

## 2020-09-11 PROCEDURE — 90694 VACC AIIV4 NO PRSRV 0.5ML IM: CPT | Mod: PBBFAC

## 2020-09-11 PROCEDURE — 99999 PR PBB SHADOW E&M-EST. PATIENT-LVL IV: ICD-10-PCS | Mod: PBBFAC,,, | Performed by: FAMILY MEDICINE

## 2020-09-11 PROCEDURE — 99999 PR PBB SHADOW E&M-EST. PATIENT-LVL IV: CPT | Mod: PBBFAC,,, | Performed by: FAMILY MEDICINE

## 2020-09-11 PROCEDURE — 99214 OFFICE O/P EST MOD 30 MIN: CPT | Mod: PBBFAC | Performed by: FAMILY MEDICINE

## 2020-09-11 PROCEDURE — 90471 IMMUNIZATION ADMIN: CPT | Mod: PBBFAC

## 2020-09-11 PROCEDURE — 99396 PREV VISIT EST AGE 40-64: CPT | Mod: S$PBB,,, | Performed by: FAMILY MEDICINE

## 2020-09-11 NOTE — ASSESSMENT & PLAN NOTE
Suspect HST false negative. PLAN: Sleep clinic evaluation.  Future Appointments   Date Time Provider Department Center   10/12/2020  9:00 AM [facility] [place]  [place]

## 2020-09-11 NOTE — PROGRESS NOTES
WELLNESS VISIT (PREVENTIVE SERVICES)    CHIEF COMPLAINT  Annual Wellness Exam    This is my first time treating her here. All problems addressed today are NEW TO ME.  Marcie is requesting that I take over as her primary care provider.     HEALTH MAINTENANCE INTERVENTIONS - UP TO DATE  Health Maintenance Topics with due status: Not Due       Topic Last Completion Date    TETANUS VACCINE 01/10/2013    Colorectal Cancer Screening 12/05/2017    Lipid Panel 04/27/2020    Mammogram 06/12/2020       HEALTH MAINTENANCE INTERVENTIONS - DUE OR DUE SOON  Health Maintenance Due   Topic Date Due    Shingles Vaccine (1 of 2) 10/04/2013       Except as noted herein, any chronic conditions are compensated/controlled and stable, and no other significant new complaints or concerns reported.     DIAGNOSES, HISTORY, ASSESSMENT, AND PLAN  Assessment   1. Encounter for well adult exam with abnormal findings    2. Acute stress reaction    3. Hypersomnia    4. Need for influenza vaccination    5. Need for 23-polyvalent pneumococcal polysaccharide vaccine    6. Essential hypertension    7. Class 1 obesity with serious comorbidity and body mass index (BMI) of 33.0 to 33.9 in adult, unspecified obesity type         Orders Placed This Encounter    Pneumococcal Polysaccharide Vaccine (23 Valent) (SQ/IM)    Influenza - Quadrivalent (Adjuvanted)       Plan   Problem List Items Addressed This Visit     Essential hypertension (Chronic)    Current Assessment & Plan     BP Readings from Last 6 Encounters:   09/11/20 124/84   08/04/20 (!) 140/91   07/07/20 (!) 115/56   06/26/20 (!) 161/97   06/15/20 (!) 142/86   05/28/20 (!) 144/87   Labile. Controlled based on today's reading.         Class 1 obesity with comorbidity and body mass index (BMI) of 33.0 to 33.9 in adult (Chronic)    Current Assessment & Plan     Wt Readings from Last 6 Encounters:   09/11/20 91.1 kg (200 lb 13.4 oz)   08/04/20 90.2 kg (198 lb 13.7 oz)   07/06/20 81.2 kg (179 lb 0.2  oz)   06/26/20 87.9 kg (193 lb 12.6 oz)   06/15/20 81.2 kg (179 lb)   05/28/20 85.8 kg (189 lb 2.5 oz)     BMI Readings from Last 2 Encounters:   09/11/20 33.42 kg/m²   08/04/20 33.09 kg/m²   Significantly worse. No orthopnea or PND. No overt ascites or worsening edema. We discussed differential diagnosis. PLAN: F/U with GI, Endocrinology, and hematology/oncology  Future Appointments   Date Time Provider Department Center   10/12/2020 10:00 AM Baldo Kim MD [facility] HEM [facility] [place]   10/28/2020  8:30 AM Christian Navarro MD [facility] ENDO [place]             Hypersomnia (Chronic)    Overview     Home Sleep Studies   Date/Time: 7/15/2020 8:00 AM  Performed by: Chandra Toney MD  Authorized by: Jarrod Roberto MD   PHYSICIAN INTERPRETATION AND COMMENTS: Findings are consistent with primary snoring. very mild sleep apnea only by RDI criteria. RDI was 13.0 events per hour. AHI was 4.0 events per hour. Oxygen saturation shayla was 86.1%. Loud snoring. Surgical procedures to ameliorate snoring may be considered. If suspicion for sleep disorder breathing is sufficient then repeat testing is indicated. Weight loss also indicated.  CLINICAL HISTORY: 54 year old female presented with: 13 inch neck, BMI of 30, an Malcolm sleepiness score of 12, history of hypertension and symptoms of nocturnal snoring, waking up choking and witnessed apneas. Based on the clinical history, the patient has a high pre-test probability of having mild BRANNON. Stop Bang score 5.         Current Assessment & Plan     Suspect HST false negative. PLAN: Sleep clinic evaluation.  Future Appointments   Date Time Provider Department Center   10/12/2020  9:00 AM Elizabeth Lejeune, NP [facility]            Acute stress reaction    Current Assessment & Plan     Son admitted to [facility] with kidney failure. She appears to be coping well.           Other Visit Diagnoses     Encounter for well adult exam with abnormal findings    -   Primary    Need for influenza vaccination        Need for 23-polyvalent pneumococcal polysaccharide vaccine        Relevant Orders    Pneumococcal Polysaccharide Vaccine (23 Valent) (SQ/IM) (Completed)          Outpatient Medications Prior to Visit   Medication Sig Dispense Refill    albuterol (PROVENTIL/VENTOLIN HFA) 90 mcg/actuation inhaler INHALE 2 PUFFS INTO THE LUNGS EVERY 6 HOURS AS NEEDED FOR WHEEZING. RESCUE 18 g 3    atorvastatin (LIPITOR) 40 MG tablet Take 1 tablet (40 mg total) by mouth every evening. 90 tablet 3    BIOTIN ORAL Take by mouth every evening.       cyclobenzaprine (FLEXERIL) 10 MG tablet Take 1/2 to 1 tab BID PRN muscle spasms. May cause drowsiness. 60 tablet 0    ergocalciferol (ERGOCALCIFEROL) 50,000 unit Cap TAKE 1 CAPSULE BY MOUTH EVERY 7 DAYS 12 capsule 0    famotidine (PEPCID) 20 MG tablet TAKE 1 TABLET(20 MG) BY MOUTH TWICE DAILY 60 tablet 5    furosemide (LASIX) 40 MG tablet TAKE 1 TABLET BY MOUTH DAILY 30 tablet 2    gabapentin (NEURONTIN) 300 MG capsule Take 1 capsule (300 mg total) by mouth 3 (three) times daily. To take with 600mg to equal 900mg dose. 90 capsule 1    gabapentin (NEURONTIN) 600 MG tablet TAKE 1 TABLET(600 MG) BY MOUTH THREE TIMES DAILY WITH 300 MG 90 tablet 1    levocetirizine (XYZAL) 5 MG tablet TAKE 1 TABLET(5 MG) BY MOUTH EVERY EVENING 30 tablet 0    magnesium oxide (MAG-OX) 400 mg (241.3 mg magnesium) tablet TAKE 2 TABLETS BY MOUTH TWICE DAILY. 270 tablet 2    NIFEdipine (PROCARDIA-XL) 30 MG (OSM) 24 hr tablet Take 1 tablet (30 mg total) by mouth once daily. 90 tablet 3    ondansetron (ZOFRAN-ODT) 4 MG TbDL DISSOLVE 1 TO 2 TABLETS ON THE TONGUE THREE TIMES DAILY AS NEEDED FOR NAUSEA 20 tablet 0    potassium chloride (KLOR-CON) 10 MEQ TbSR TAKE 2 TABLETS BY MOUTH DAILY 60 tablet 2    tacrolimus (PROGRAF) 1 MG Cap Take 2 capsules (2 mg total) by mouth every morning AND 1 capsule (1 mg total) every evening. 90 capsule 11    gabapentin (NEURONTIN)  300 MG capsule Take 3 capsules (900 mg total) by mouth 2 (two) times daily. (Patient not taking: Reported on 9/11/2020) 120 capsule 3    mycophenolate (CELLCEPT) 250 mg Cap       senna-docusate 8.6-50 mg (PERICOLACE) 8.6-50 mg per tablet Take 1 tablet by mouth 2 (two) times daily. Take while taking narcotic pain medications 28 tablet 0     No facility-administered medications prior to visit.         There are no discontinued medications.          Follow up in about 31 days (around 10/12/2020) for re-evaluate problem(s) discussed today.     Subjective   Review of Systems   Constitutional: Positive for fatigue. Negative for chills and fever.   Respiratory: Negative for chest tightness and shortness of breath.    Gastrointestinal: Negative for blood in stool and vomiting.   Endocrine: Negative for polydipsia and polyuria.   Genitourinary: Negative for dysuria and hematuria.        Objective   Vitals:    09/11/20 1045   BP: 124/84   BP Location: Left arm   Patient Position: Sitting   BP Method: Large (Manual)   Pulse: 95   Temp: 98.3 °F (36.8 °C)   TempSrc: Tympanic   SpO2: 99%   Weight: 91.1 kg (200 lb 13.4 oz)     Physical Exam  Vitals signs reviewed.   Constitutional:       General: She is not in acute distress.     Appearance: Normal appearance.   Eyes:      General: No scleral icterus.     Conjunctiva/sclera: Conjunctivae normal.   Neck:      Musculoskeletal: No muscular tenderness.      Vascular: No carotid bruit.   Cardiovascular:      Rate and Rhythm: Normal rate and regular rhythm.      Heart sounds: Normal heart sounds.   Pulmonary:      Effort: Pulmonary effort is normal. No respiratory distress.      Breath sounds: No wheezing or rhonchi.   Abdominal:      General: Bowel sounds are normal. There is no distension.      Palpations: Abdomen is soft.      Tenderness: There is no abdominal tenderness.   Lymphadenopathy:      Cervical: No cervical adenopathy.   Skin:     General: Skin is warm and dry.       Coloration: Skin is not jaundiced.   Neurological:      General: No focal deficit present.      Mental Status: She is alert and oriented to person, place, and time.   Psychiatric:         Mood and Affect: Mood normal.         Behavior: Behavior normal.         Judgment: Judgment normal.           PAST MEDICAL HISTORY  She has a past medical history of Alcohol abuse, Alcoholic hepatitis, Anemia of chronic disease, Arthritis, Cancer, Coagulopathy, Dyspnea on exertion, Encounter for blood transfusion, End stage liver disease, History of alcohol abuse, Hyperlipidemia, Hypersomnia, Hypertension, Hyponatremia, Liver cirrhosis, Liver transplant candidate, and Obesity (BMI 30-39.9).    SURGICAL HISTORY  She has a past surgical history that includes Cholecystectomy; Hysterectomy; Breast biopsy (Left); Colonoscopy (N/A, 12/1/2017); Colonoscopy (N/A, 12/5/2017); Liver transplant (12/2017); Injection of piriformis muscle (Right, 6/14/2019); Injection of anesthetic agent into sacroiliac joint (Right, 6/14/2019); Injection of joint (Bilateral, 2/7/2020); Injection of anesthetic agent into sacroiliac joint (Bilateral, 2/7/2020); Excisional hemorrhoidectomy (N/A, 2/18/2020); Injection of anesthetic agent around pudendal nerve (N/A, 2/18/2020); Examination under anesthesia (N/A, 2/18/2020); Injection of joint (Bilateral, 7/7/2020); and Injection of anesthetic agent into sacroiliac joint (Bilateral, 7/7/2020).    FAMILY HISTORY  Her family history includes Alzheimer's disease in her mother; Breast cancer in her paternal aunt; Depression in her maternal grandfather; Diabetes in her father and sister; Hypertension in her father and mother.     ALLERGIES  Review of patient's allergies indicates:   Allergen Reactions    Ferrous sulfate Other (See Comments)     Patient states the pill makes her sick. She stated she would rather have a shot       SOCIAL HISTORY   TOBACCO USE HISTORY: She reports that she has never smoked. She has never  "used smokeless tobacco.   ALCOHOL USE HISTORY:  She reports no history of alcohol use.   RECREATIONAL DRUG USE HISTORY:  She reports no history of drug use.    Documentation entered by me for this encounter may have been done in part using speech-recognition technology. Although I have made an effort to ensure accuracy, "sound like" errors may exist and should be interpreted in context. -KATJA Munoz MD.     "

## 2020-09-11 NOTE — Clinical Note
Dr. Harley Scott.    I saw Marcie for her annual check-up. She asked me to relay to you that she has been having more bloating and has been feeling more fatigued.    I was that her labs from June were stable. Her exam was unremarkable.    This was my first time meeting her, so I don't really know her baseline, but she perceived that her present symptoms represented a significant change.    This is just FYI, unless you want to have one of your staff reach out to her.    Thanks, take care, and be well.    -LM

## 2020-09-11 NOTE — ASSESSMENT & PLAN NOTE
>>ASSESSMENT AND PLAN FOR HYPERSOMNIA WRITTEN ON 9/11/2020 11:19 AM BY KATJA ROSE MD    Suspect HST false negative. PLAN: Sleep clinic evaluation.  Future Appointments   Date Time Provider Department Center   10/12/2020  9:00 AM [facility]

## 2020-09-11 NOTE — ASSESSMENT & PLAN NOTE
BP Readings from Last 6 Encounters:   09/11/20 124/84   08/04/20 (!) 140/91   07/07/20 (!) 115/56   06/26/20 (!) 161/97   06/15/20 (!) 142/86   05/28/20 (!) 144/87   Labile. Controlled based on today's reading.

## 2020-09-11 NOTE — ASSESSMENT & PLAN NOTE
Wt Readings from Last 6 Encounters:   09/11/20 91.1 kg (200 lb 13.4 oz)   08/04/20 90.2 kg (198 lb 13.7 oz)   07/06/20 81.2 kg (179 lb 0.2 oz)   06/26/20 87.9 kg (193 lb 12.6 oz)   06/15/20 81.2 kg (179 lb)   05/28/20 85.8 kg (189 lb 2.5 oz)     BMI Readings from Last 2 Encounters:   09/11/20 33.42 kg/m²   08/04/20 33.09 kg/m²   Significantly worse. No orthopnea or PND. No overt ascites or worsening edema. We discussed differential diagnosis. PLAN: F/U with GI, Endocrinology, and hematology/oncology  Future Appointments   Date Time Provider Department Center   10/12/2020 10:00 AM MD [facility] HEM ONC  [place]   10/28/2020  8:30 AM Christian Navarro MD [facility] [place]

## 2020-09-28 ENCOUNTER — LAB VISIT (OUTPATIENT)
Dept: LAB | Facility: HOSPITAL | Age: 55
End: 2020-09-28
Attending: INTERNAL MEDICINE
Payer: MEDICAID

## 2020-09-28 DIAGNOSIS — Z94.4 LIVER REPLACED BY TRANSPLANT: ICD-10-CM

## 2020-09-28 LAB
ALBUMIN SERPL BCP-MCNC: 3.6 G/DL (ref 3.5–5.2)
ALP SERPL-CCNC: 182 U/L (ref 55–135)
ALT SERPL W/O P-5'-P-CCNC: 31 U/L (ref 10–44)
ANION GAP SERPL CALC-SCNC: 9 MMOL/L (ref 8–16)
AST SERPL-CCNC: 22 U/L (ref 10–40)
BASOPHILS # BLD AUTO: 0.04 K/UL (ref 0–0.2)
BASOPHILS NFR BLD: 0.4 % (ref 0–1.9)
BILIRUB SERPL-MCNC: 0.5 MG/DL (ref 0.1–1)
BUN SERPL-MCNC: 11 MG/DL (ref 6–20)
CALCIUM SERPL-MCNC: 9.3 MG/DL (ref 8.7–10.5)
CHLORIDE SERPL-SCNC: 106 MMOL/L (ref 95–110)
CO2 SERPL-SCNC: 27 MMOL/L (ref 23–29)
CREAT SERPL-MCNC: 0.9 MG/DL (ref 0.5–1.4)
DIFFERENTIAL METHOD: ABNORMAL
EOSINOPHIL # BLD AUTO: 0.2 K/UL (ref 0–0.5)
EOSINOPHIL NFR BLD: 1.7 % (ref 0–8)
ERYTHROCYTE [DISTWIDTH] IN BLOOD BY AUTOMATED COUNT: 16.1 % (ref 11.5–14.5)
EST. GFR  (AFRICAN AMERICAN): >60 ML/MIN/1.73 M^2
EST. GFR  (NON AFRICAN AMERICAN): >60 ML/MIN/1.73 M^2
GLUCOSE SERPL-MCNC: 92 MG/DL (ref 70–110)
HCT VFR BLD AUTO: 37.1 % (ref 37–48.5)
HGB BLD-MCNC: 11.1 G/DL (ref 12–16)
IMM GRANULOCYTES # BLD AUTO: 0.03 K/UL (ref 0–0.04)
IMM GRANULOCYTES NFR BLD AUTO: 0.3 % (ref 0–0.5)
LYMPHOCYTES # BLD AUTO: 2.4 K/UL (ref 1–4.8)
LYMPHOCYTES NFR BLD: 24 % (ref 18–48)
MAGNESIUM SERPL-MCNC: 1.6 MG/DL (ref 1.6–2.6)
MCH RBC QN AUTO: 23.6 PG (ref 27–31)
MCHC RBC AUTO-ENTMCNC: 29.9 G/DL (ref 32–36)
MCV RBC AUTO: 79 FL (ref 82–98)
MONOCYTES # BLD AUTO: 0.6 K/UL (ref 0.3–1)
MONOCYTES NFR BLD: 5.9 % (ref 4–15)
NEUTROPHILS # BLD AUTO: 6.8 K/UL (ref 1.8–7.7)
NEUTROPHILS NFR BLD: 67.7 % (ref 38–73)
NRBC BLD-RTO: 0 /100 WBC
PLATELET # BLD AUTO: 105 K/UL (ref 150–350)
PMV BLD AUTO: 10.9 FL (ref 9.2–12.9)
POTASSIUM SERPL-SCNC: 4 MMOL/L (ref 3.5–5.1)
PROT SERPL-MCNC: 7.3 G/DL (ref 6–8.4)
RBC # BLD AUTO: 4.71 M/UL (ref 4–5.4)
SODIUM SERPL-SCNC: 142 MMOL/L (ref 136–145)
WBC # BLD AUTO: 10.02 K/UL (ref 3.9–12.7)

## 2020-09-28 PROCEDURE — 80053 COMPREHEN METABOLIC PANEL: CPT

## 2020-09-28 PROCEDURE — 36415 COLL VENOUS BLD VENIPUNCTURE: CPT

## 2020-09-28 PROCEDURE — 85025 COMPLETE CBC W/AUTO DIFF WBC: CPT

## 2020-09-28 PROCEDURE — 83735 ASSAY OF MAGNESIUM: CPT

## 2020-09-28 PROCEDURE — 80197 ASSAY OF TACROLIMUS: CPT

## 2020-09-29 LAB — TACROLIMUS BLD-MCNC: 5.7 NG/ML (ref 5–15)

## 2020-10-02 ENCOUNTER — TELEPHONE (OUTPATIENT)
Dept: TRANSPLANT | Facility: CLINIC | Age: 55
End: 2020-10-02

## 2020-10-02 NOTE — TELEPHONE ENCOUNTER
Sent patient message via portal to let her know labs are stable.  No medication changes, next labs due on 12/14/20      ----- Message from Joselin Souza MD sent at 10/2/2020 12:20 PM CDT -----  Reviewed, nothing to do; repeat per routine

## 2020-10-07 ENCOUNTER — PATIENT MESSAGE (OUTPATIENT)
Dept: PAIN MEDICINE | Facility: CLINIC | Age: 55
End: 2020-10-07

## 2020-10-07 ENCOUNTER — TELEPHONE (OUTPATIENT)
Dept: PAIN MEDICINE | Facility: CLINIC | Age: 55
End: 2020-10-07

## 2020-10-07 NOTE — TELEPHONE ENCOUNTER
Pt stated that she is not able to  medication because she dont have any refills left made pt aware she janet need to be seen in clinic to get refill//dp

## 2020-10-08 ENCOUNTER — OFFICE VISIT (OUTPATIENT)
Dept: PAIN MEDICINE | Facility: CLINIC | Age: 55
End: 2020-10-08
Payer: MEDICAID

## 2020-10-08 VITALS
SYSTOLIC BLOOD PRESSURE: 137 MMHG | DIASTOLIC BLOOD PRESSURE: 92 MMHG | RESPIRATION RATE: 18 BRPM | HEART RATE: 90 BPM | BODY MASS INDEX: 33.32 KG/M2 | HEIGHT: 65 IN | WEIGHT: 200 LBS

## 2020-10-08 DIAGNOSIS — M54.16 LUMBAR RADICULOPATHY: ICD-10-CM

## 2020-10-08 DIAGNOSIS — M70.62 GREATER TROCHANTERIC BURSITIS OF BOTH HIPS: ICD-10-CM

## 2020-10-08 DIAGNOSIS — G57.01 PIRIFORMIS SYNDROME, RIGHT: ICD-10-CM

## 2020-10-08 DIAGNOSIS — M46.1 SACROILIITIS: Primary | ICD-10-CM

## 2020-10-08 DIAGNOSIS — M70.61 GREATER TROCHANTERIC BURSITIS OF BOTH HIPS: ICD-10-CM

## 2020-10-08 PROCEDURE — 99999 PR PBB SHADOW E&M-EST. PATIENT-LVL V: CPT | Mod: PBBFAC,,, | Performed by: PHYSICIAN ASSISTANT

## 2020-10-08 PROCEDURE — 99214 PR OFFICE/OUTPT VISIT, EST, LEVL IV, 30-39 MIN: ICD-10-PCS | Mod: S$PBB,,, | Performed by: PHYSICIAN ASSISTANT

## 2020-10-08 PROCEDURE — 99214 OFFICE O/P EST MOD 30 MIN: CPT | Mod: S$PBB,,, | Performed by: PHYSICIAN ASSISTANT

## 2020-10-08 PROCEDURE — 99999 PR PBB SHADOW E&M-EST. PATIENT-LVL V: ICD-10-PCS | Mod: PBBFAC,,, | Performed by: PHYSICIAN ASSISTANT

## 2020-10-08 PROCEDURE — 99215 OFFICE O/P EST HI 40 MIN: CPT | Mod: PBBFAC | Performed by: PHYSICIAN ASSISTANT

## 2020-10-08 RX ORDER — CYCLOBENZAPRINE HCL 10 MG
TABLET ORAL
Qty: 90 TABLET | Refills: 0 | Status: SHIPPED | OUTPATIENT
Start: 2020-10-08 | End: 2020-12-06 | Stop reason: SDUPTHER

## 2020-10-09 ENCOUNTER — TELEPHONE (OUTPATIENT)
Dept: PAIN MEDICINE | Facility: CLINIC | Age: 55
End: 2020-10-09

## 2020-10-09 ENCOUNTER — PATIENT MESSAGE (OUTPATIENT)
Dept: PAIN MEDICINE | Facility: CLINIC | Age: 55
End: 2020-10-09
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
## 2020-10-09 NOTE — TELEPHONE ENCOUNTER
----- Message from Lois Recio sent at 10/9/2020 12:56 PM CDT -----  Regarding: patient advice  Contact: patient  Patient stated she was waiting on a call back regarding some dates she was supposed to be given for a pre surgery appointment. Patient stated she has not received a call back yet. Please call back at [phone].

## 2020-10-12 ENCOUNTER — OFFICE VISIT (OUTPATIENT)
Dept: PULMONOLOGY | Facility: CLINIC | Age: 55
End: 2020-10-12
Payer: MEDICAID

## 2020-10-12 ENCOUNTER — TELEPHONE (OUTPATIENT)
Dept: PAIN MEDICINE | Facility: CLINIC | Age: 55
End: 2020-10-12

## 2020-10-12 VITALS
OXYGEN SATURATION: 97 % | SYSTOLIC BLOOD PRESSURE: 120 MMHG | BODY MASS INDEX: 33.53 KG/M2 | WEIGHT: 201.25 LBS | RESPIRATION RATE: 14 BRPM | HEIGHT: 65 IN | HEART RATE: 79 BPM | DIASTOLIC BLOOD PRESSURE: 80 MMHG

## 2020-10-12 DIAGNOSIS — Z72.821 POOR SLEEP HYGIENE: ICD-10-CM

## 2020-10-12 DIAGNOSIS — E66.9 OBESITY, CLASS I, BMI 30-34.9: ICD-10-CM

## 2020-10-12 DIAGNOSIS — G47.33 OSA (OBSTRUCTIVE SLEEP APNEA): Primary | ICD-10-CM

## 2020-10-12 PROCEDURE — 99999 PR PBB SHADOW E&M-EST. PATIENT-LVL V: CPT | Mod: PBBFAC,,, | Performed by: NURSE PRACTITIONER

## 2020-10-12 PROCEDURE — 99215 OFFICE O/P EST HI 40 MIN: CPT | Mod: PBBFAC | Performed by: NURSE PRACTITIONER

## 2020-10-12 PROCEDURE — 99214 OFFICE O/P EST MOD 30 MIN: CPT | Mod: S$PBB,,, | Performed by: NURSE PRACTITIONER

## 2020-10-12 PROCEDURE — 99999 PR PBB SHADOW E&M-EST. PATIENT-LVL V: ICD-10-PCS | Mod: PBBFAC,,, | Performed by: NURSE PRACTITIONER

## 2020-10-12 PROCEDURE — 99214 PR OFFICE/OUTPT VISIT, EST, LEVL IV, 30-39 MIN: ICD-10-PCS | Mod: S$PBB,,, | Performed by: NURSE PRACTITIONER

## 2020-10-12 NOTE — PROGRESS NOTES
"Subjective:      Patient ID: Marcie Bazan is a 55 y.o. female.    Chief Complaint: Sleep Apnea (rev hsat)    HPI    Patient presents to the office today for evaluation of sleep apnea.  Patient with snoring and witnessed apneas. Patient not having problems falling asleep, but wakes up frequently throughout the night. She keeps TV on all night, changing channels in the middle of the night to watch a program. She also pays on phone for a bit to fall back asleep during the night.  Patient does not wake up feeling refreshed in the morning.  Patient with daytime hypersomnolence.  [place] Sleepiness Scale score 16.  Patient has had symptoms for years. Comorbidities include HTN  Bedtime: 8-10PM  Wake time: 5-6AM  More recently going back to bed from 8-noon.  STOP bang 5. HST ordered per Dr. Roberto. She states she did not sleep well      Patient Active Problem List   Diagnosis    Allergic rhinitis    Anemia of chronic disease    Erosive esophagitis    Essential hypertension    History of abnormal cervical Pap smear    History of Helicobacter pylori infection    Lipoma    Multinodular thyroid    Hypomagnesemia    Liver replaced by transplant    Adrenal cortical steroids causing adverse effect in therapeutic use    At risk for opportunistic infections    Long-term use of immunosuppressant medication    Accelerated liver transplant rejection    Vitamin D deficiency    Mixed hyperlipidemia    Adhesive capsulitis of both shoulders    Piriformis syndrome of both sides    Spondylosis of lumbar region without myelopathy or radiculopathy    Class 1 obesity with comorbidity and body mass index (BMI) of 33.0 to 33.9 in adult    Sacroiliac joint dysfunction    Acute stress reaction    Hypersomnia         /80   Pulse 79   Resp 14   Ht 5' 5" (1.651 m)   Wt 91.3 kg (201 lb 4.5 oz)   LMP 01/01/1985 (Approximate) Comment: 1985  SpO2 97%   BMI 33.49 kg/m²   Body mass index is 33.49 kg/m².    Review of " Systems   Constitutional: Positive for fatigue.   Respiratory: Positive for snoring and dyspnea on extertion.    Musculoskeletal: Positive for arthralgias.   Psychiatric/Behavioral: Positive for sleep disturbance.   All other systems reviewed and are negative.        Objective:      Physical Exam  Constitutional:       Appearance: She is well-developed.   HENT:      Head: Normocephalic and atraumatic.      Mouth/Throat:      Comments: Wearing mask due to covid concerns  Neck:      Musculoskeletal: Normal range of motion and neck supple.   Cardiovascular:      Rate and Rhythm: Normal rate and regular rhythm.      Heart sounds: No murmur. No gallop.    Pulmonary:      Effort: Pulmonary effort is normal.      Breath sounds: Normal breath sounds.   Abdominal:      Palpations: Abdomen is soft. There is no mass.      Tenderness: There is no abdominal tenderness.   Musculoskeletal: Normal range of motion.   Skin:     General: Skin is warm and dry.   Neurological:      Mental Status: She is alert and oriented to person, place, and time.   Psychiatric:         Mood and Affect: Mood normal.         Behavior: Behavior normal.       Personal Diagnostic Review    Home Sleep Studies   Date/Time: 7/15/2020 8:00 AM  Performed by: Chandra Toney MD  Authorized by: Jarrod Roberto MD        PHYSICIAN INTERPRETATION AND COMMENTS: Findings are consistent with primary snoring. very mild sleep apnea  only by RDI criteria. RDI was 13.0 events per hour. AHI was 4.0 events per hour. Oxygen saturation shayla was 86.1%. Loud  snoring. Surgical procedures to ameliorate snoring may be considered. If suspicion for sleep disorder breathing is sufficient then  repeat testing is indicated. Weight loss also indicated.  CLINICAL HISTORY: 54 year old female presented with: 13 inch neck, BMI of 30, an [place] sleepiness score of 12, history of  hypertension and symptoms of nocturnal snoring, waking up choking and witnessed apneas. Based on the clinical  history, the patient  has a high pre-test probability of having mild BRANNON. Stop Bang score     Results for orders placed during the hospital encounter of 03/26/20   X-Ray Chest PA And Lateral    Narrative EXAMINATION:  XR CHEST PA AND LATERAL    CLINICAL HISTORY:  Shortness of breath    TECHNIQUE:  PA and lateral views of the chest were performed.    COMPARISON:  February 27, 2020    FINDINGS:  No significant oval change.  Lungs symmetrically aerated and clear.  Smooth stable elevation right hemidiaphragm, midline trachea and sharp CP angles again noted.  Osseous structures intact.      Impression Stable exam without acute infiltrate.      Electronically signed by: Wayne Guzman MD  Date:    03/26/2020  Time:    08:42         Assessment:     1. BRANNON (obstructive sleep apnea)    2. Poor sleep hygiene    3. Obesity, Class I, BMI 30-34.9    4. BMI 33.0-33.9,adult       Outpatient Encounter Medications as of 10/12/2020   Medication Sig Dispense Refill    albuterol (PROVENTIL/VENTOLIN HFA) 90 mcg/actuation inhaler INHALE 2 PUFFS INTO THE LUNGS EVERY 6 HOURS AS NEEDED FOR WHEEZING. RESCUE 18 g 3    BIOTIN ORAL Take by mouth every evening.       cyclobenzaprine (FLEXERIL) 10 MG tablet Take 1/2 to 1 tab TID PRN muscle spasms. May cause drowsiness. 90 tablet 0    ergocalciferol (ERGOCALCIFEROL) 50,000 unit Cap TAKE 1 CAPSULE BY MOUTH EVERY 7 DAYS 12 capsule 0    famotidine (PEPCID) 20 MG tablet TAKE 1 TABLET(20 MG) BY MOUTH TWICE DAILY 60 tablet 5    furosemide (LASIX) 40 MG tablet TAKE 1 TABLET BY MOUTH DAILY 30 tablet 2    gabapentin (NEURONTIN) 300 MG capsule Take 3 capsules (900 mg total) by mouth 2 (two) times daily. 120 capsule 3    gabapentin (NEURONTIN) 300 MG capsule Take 1 capsule (300 mg total) by mouth 3 (three) times daily. To take with 600mg to equal 900mg dose. 90 capsule 1    gabapentin (NEURONTIN) 600 MG tablet TAKE 1 TABLET(600 MG) BY MOUTH THREE TIMES DAILY WITH 300 MG 90 tablet 1    levocetirizine  (XYZAL) 5 MG tablet TAKE 1 TABLET(5 MG) BY MOUTH EVERY EVENING 30 tablet 0    magnesium oxide (MAG-OX) 400 mg (241.3 mg magnesium) tablet TAKE 2 TABLETS BY MOUTH TWICE DAILY. 270 tablet 2    ondansetron (ZOFRAN-ODT) 4 MG TbDL DISSOLVE 1 TO 2 TABLETS ON THE TONGUE THREE TIMES DAILY AS NEEDED FOR NAUSEA 20 tablet 0    potassium chloride (KLOR-CON) 10 MEQ TbSR TAKE 2 TABLETS BY MOUTH DAILY 60 tablet 2    senna-docusate 8.6-50 mg (PERICOLACE) 8.6-50 mg per tablet Take 1 tablet by mouth 2 (two) times daily. Take while taking narcotic pain medications 28 tablet 0    tacrolimus (PROGRAF) 1 MG Cap Take 2 capsules (2 mg total) by mouth every morning AND 1 capsule (1 mg total) every evening. 90 capsule 11    [DISCONTINUED] atorvastatin (LIPITOR) 40 MG tablet Take 1 tablet (40 mg total) by mouth every evening. 90 tablet 3    [DISCONTINUED] NIFEdipine (PROCARDIA-XL) 30 MG (OSM) 24 hr tablet Take 1 tablet (30 mg total) by mouth once daily. 90 tablet 3    methylPREDNISolone (MEDROL) 2 MG tablet Take 1 tablet (2 mg total) by mouth once daily. for 5 days (Patient not taking: Reported on 10/12/2020) 5 tablet 0    mycophenolate (CELLCEPT) 250 mg Cap       [DISCONTINUED] ergocalciferol (ERGOCALCIFEROL) 50,000 unit Cap TAKE 1 CAPSULE BY MOUTH EVERY 7 DAYS 12 capsule 0     No facility-administered encounter medications on file as of 10/12/2020.      Orders Placed This Encounter   Procedures    Polysomnogram (CPAP will be added if patient meets diagnostic criteria.)     Standing Status:   Future     Standing Expiration Date:   10/12/2021     Plan:     Problem List Items Addressed This Visit     None      Visit Diagnoses     BRANNON (obstructive sleep apnea)    -  Primary    Relevant Orders    Polysomnogram (CPAP will be added if patient meets diagnostic criteria.)    Poor sleep hygiene        Obesity, Class I, BMI 30-34.9        BMI 33.0-33.9,adult          Written information given for improving sleep habits.   Strong suspicion  for sleep apnea.  Continue work up.   Weight loss and exercise to improve overall health.

## 2020-10-12 NOTE — PATIENT INSTRUCTIONS
"  Best way to obtain adequate sleep:  If you have sleep apnea, use prescribed CPAP any time you are sleeping.  Avoid caffeine in the afternoon/evening time.  Avoid alcohol. Alcohol is a sedative that blocks your REM sleep    Stimulus control -- Stimulus control therapy is based on the idea that some people with insomnia have learned to associate the bedroom with staying awake rather than sleeping.  ?You should spend no more than 20 minutes lying in bed trying to fall asleep.  ?If you cannot fall asleep within 20 minutes, get up, go to another room and read or find another relaxing activity until you feel sleepy again. Activities such as eating, balancing your checkbook, doing housework, watching TV, or studying for a test, which "reward" you for staying awake, should be avoided.  ?When you start to feel sleepy, you can return to bed. If you cannot fall asleep in another 20 minutes, repeat the process.  ?Set an alarm clock and get up at the same time every day, including weekends.  ?Do not take a nap during the day.  You may not sleep much on the first night. However, sleep is more likely on succeeding nights because sleepiness is increased and naps are not allowed.    Restrict time in bedroom to 8 hours. Some people with insomnia have long awakenings during the night and some try to deal with their poor sleep by staying in bed longer in the morning to "make up" some of their lost sleep. This additional sleep later in the morning may make it more difficult to fall asleep that night, resulting in the need to stay in bed even longer the following morning. This restriction should consolidate sleep and breaks this cycle.  Do not attempt to go to bed until you are awake for 16 hours.   Do not go to bed if you are not sleepy.  Do not go to bedroom until it is time to go to sleep.    One main reason for insomnia today is electronics. No TV or electronics in the bedroom. Associate the bedroom to only sleep and sex.   All " "electronic use outside the bedroom. Stop using electronics 1-2 hours before bedtime. The electronics have blue lights which have a profound suppressive effect on your natural melatonin which assist with sleep. If your phone or pad has a "night shift" option, change to that 2 hours before bedtime. This makes the light on the phone a yellow softer light. Check your settings-display and brightness-night shift. When we use electronics, we also tend to delay sleep time by getting involved in a game or social media. Set a timer when it is ready to go to bed and stick with it.   Dim lights an hour before bedtime.  Meditation and warm bath helps with preparing for sleep.  Make bedroom cool and dark. White noise helps some people sleep more soundly.  Sleep should be nonnegotiable . Give yourself at least 8 hours of uninterrupted sleep nightly for your health and well-being.        "

## 2020-10-13 ENCOUNTER — TELEPHONE (OUTPATIENT)
Dept: INTERNAL MEDICINE | Facility: CLINIC | Age: 55
End: 2020-10-13

## 2020-10-13 DIAGNOSIS — G47.33 OSA (OBSTRUCTIVE SLEEP APNEA): Primary | ICD-10-CM

## 2020-10-13 NOTE — TELEPHONE ENCOUNTER
----- Message from Cecy Ogden sent at 10/13/2020  1:36 PM CDT -----  Regarding: appt  Contact: pt  Pt is calling the staff regarding the pt will need to reschedule pt appt until the the pt see the Endo Doctor on Nov. 3, 2020    Pt would like for the staff to call pt back so the pt can  explain further.    Pt call back [phone]    thanks

## 2020-10-13 NOTE — TELEPHONE ENCOUNTER
Spoke with patient and rescheduled her appointment with Dr. Munoz as she requested. She verbalized understanding.

## 2020-10-14 NOTE — PRE-PROCEDURE INSTRUCTIONS
Spoke with patient regarding procedure scheduled on 10/21    Arrival time 0700    Has patient been sick with fever or on antibiotics within the last 7 days? No    Has patient received a vaccination within the last 7 days? no    Has the patient stopped all medications as directed? Na    Does patient have a pacemaker and or defibrillator? no    Does the patient have a ride to and from procedure and someone reliable to remain with patient? Ross     Is the patient diabetic? no    Does the patient have sleep apnea? Or use O2 at home? BRANNON    Is the patient receiving sedation? yes    Is the patient instructed to remain NPO beginning at midnight the night before their procedure? yes    Procedure location confirmed with patient? Yes    Covid- Denies signs/symptoms. Instructed to notify PAT/MD if any changes.

## 2020-10-18 ENCOUNTER — PATIENT MESSAGE (OUTPATIENT)
Dept: GASTROENTEROLOGY | Facility: CLINIC | Age: 55
End: 2020-10-18

## 2020-10-19 RX ORDER — POTASSIUM CHLORIDE 750 MG/1
20 TABLET, EXTENDED RELEASE ORAL DAILY
Qty: 60 TABLET | Refills: 2 | Status: SHIPPED | OUTPATIENT
Start: 2020-10-19 | End: 2021-02-16 | Stop reason: SDUPTHER

## 2020-10-20 DIAGNOSIS — E55.9 VITAMIN D DEFICIENCY: ICD-10-CM

## 2020-10-21 ENCOUNTER — HOSPITAL ENCOUNTER (OUTPATIENT)
Facility: HOSPITAL | Age: 55
Discharge: HOME | End: 2020-10-21
Attending: PAIN MEDICINE | Admitting: PAIN MEDICINE
Payer: MEDICAID

## 2020-10-21 VITALS
SYSTOLIC BLOOD PRESSURE: 138 MMHG | RESPIRATION RATE: 20 BRPM | HEART RATE: 77 BPM | BODY MASS INDEX: 33.46 KG/M2 | TEMPERATURE: 98 F | DIASTOLIC BLOOD PRESSURE: 82 MMHG | WEIGHT: 200.81 LBS | OXYGEN SATURATION: 100 % | HEIGHT: 65 IN

## 2020-10-21 DIAGNOSIS — M53.3 SACROILIAC JOINT DYSFUNCTION: Primary | ICD-10-CM

## 2020-10-21 DIAGNOSIS — G57.01 PIRIFORMIS SYNDROME, RIGHT: ICD-10-CM

## 2020-10-21 DIAGNOSIS — M46.1 SACROILIITIS: ICD-10-CM

## 2020-10-21 DIAGNOSIS — M70.62 GREATER TROCHANTERIC BURSITIS OF BOTH HIPS: ICD-10-CM

## 2020-10-21 DIAGNOSIS — M70.61 GREATER TROCHANTERIC BURSITIS OF BOTH HIPS: ICD-10-CM

## 2020-10-21 DIAGNOSIS — G57.03 PIRIFORMIS SYNDROME OF BOTH SIDES: ICD-10-CM

## 2020-10-21 PROCEDURE — 25000003 PHARM REV CODE 250: Performed by: PAIN MEDICINE

## 2020-10-21 PROCEDURE — 20610 DRAIN/INJ JOINT/BURSA W/O US: CPT | Mod: 59,RT,, | Performed by: PAIN MEDICINE

## 2020-10-21 PROCEDURE — 27096 INJECT SACROILIAC JOINT: CPT | Performed by: PAIN MEDICINE

## 2020-10-21 PROCEDURE — 20552 NJX 1/MLT TRIGGER POINT 1/2: CPT | Performed by: PAIN MEDICINE

## 2020-10-21 PROCEDURE — 25500020 PHARM REV CODE 255: Performed by: PAIN MEDICINE

## 2020-10-21 PROCEDURE — 27096 INJECT SACROILIAC JOINT: CPT | Mod: RT,,, | Performed by: PAIN MEDICINE

## 2020-10-21 PROCEDURE — 20610 PR DRAIN/INJECT LARGE JOINT/BURSA: ICD-10-PCS | Mod: 59,RT,, | Performed by: PAIN MEDICINE

## 2020-10-21 PROCEDURE — 63600175 PHARM REV CODE 636 W HCPCS: Performed by: PAIN MEDICINE

## 2020-10-21 PROCEDURE — 27096 PR INJECTION,SACROILIAC JOINT: ICD-10-PCS | Mod: RT,,, | Performed by: PAIN MEDICINE

## 2020-10-21 PROCEDURE — 20610 DRAIN/INJ JOINT/BURSA W/O US: CPT | Performed by: PAIN MEDICINE

## 2020-10-21 RX ORDER — METHYLPREDNISOLONE ACETATE 40 MG/ML
INJECTION, SUSPENSION INTRA-ARTICULAR; INTRALESIONAL; INTRAMUSCULAR; SOFT TISSUE
Status: DISCONTINUED | OUTPATIENT
Start: 2020-10-21 | End: 2020-10-21 | Stop reason: HOSPADM

## 2020-10-21 RX ORDER — MIDAZOLAM HYDROCHLORIDE 1 MG/ML
INJECTION, SOLUTION INTRAMUSCULAR; INTRAVENOUS
Status: DISCONTINUED | OUTPATIENT
Start: 2020-10-21 | End: 2020-10-21 | Stop reason: HOSPADM

## 2020-10-21 RX ORDER — FENTANYL CITRATE 50 UG/ML
INJECTION, SOLUTION INTRAMUSCULAR; INTRAVENOUS
Status: DISCONTINUED | OUTPATIENT
Start: 2020-10-21 | End: 2020-10-21 | Stop reason: HOSPADM

## 2020-10-21 RX ORDER — LIDOCAINE HYDROCHLORIDE 20 MG/ML
INJECTION, SOLUTION EPIDURAL; INFILTRATION; INTRACAUDAL; PERINEURAL
Status: DISCONTINUED | OUTPATIENT
Start: 2020-10-21 | End: 2020-10-21 | Stop reason: HOSPADM

## 2020-10-21 NOTE — DISCHARGE INSTRUCTIONS

## 2020-10-21 NOTE — PLAN OF CARE
Patient ready for discharge. Discharge instructions reviewed. No questions at this time. Dressing remain clean, dry, intact.

## 2020-10-21 NOTE — OP NOTE
PROCEDURE: Sacroiliac joint and Greater trochanteric bursa injection under fluoroscopic guidance and right piriformis muscle injection     SIDE: RIGHT Sacroiliac injection and RIGHT greater trochanteric bursa injection, right piriformis     PROCEDURE DATE: 10/21/2020    PREOPERATIVE DIAGNOSIS: Sacroiliitis, greater trochanteric bursitis, piriformis muscle syndrome  POSTOPERATIVE DIAGNOSIS: Sacroiliitis, greater trochanteric bursitis, piriformis muscle syndrome    PROVIDER: KATJA Desai MD  Assistant(s): none    Anesthesia: Local, IV Sedation     >> 1 mg of VERSED    >> 50 mcg of FENTANYL     INDICATION: The patient has a history of pain due to sacroiliitis unresponsive to conservative treatments. Fluoroscopy was used to optimize visualization of needle placement and to maximize safety.       PROCEDURE DESCRIPTION: The patient was seen and identified in the preoperative area. Risks, benefits, complications, and alternatives were discussed with the patient. The patient agreed to proceed with the procedure and signed the consent. The site and side of the procedure was identified and marked.     The patient was taken to the procedural suite. The patient was positioned in prone orientation on procedure table. A time out was performed prior to any intervention. The procedure, site, side, and allergies were stated and agreed to by all present. The lumbosacral area extending to the skin overlying the femoral greater trochanter was widely prepped with ChloraPrep. The procedural site was draped in usual sterile fashion. Vital signs were closely monitored throughout this procedure.     The fluoroscopic camera was placed in contralateral oblique view and was adjusted until the anterior and posterior joint margins of the targeted sacroiliac joint aligned in linear array. The lower pole of the joint was identified, marked, and localized with 1% Lidocaine. A 25 gauge 3.5 inch spinal needle was introduced and advanced to the  joint under fluoroscopic guidance. The joint space was entered and after negative aspiration, 2 mL of injectate was posited into the joint space. The needle was then withdrawn outside of the joint space and after negative aspiration 1 mL of solution was injected outside of the joint space. The injectant solution used was comprised of 7 mL of 2% lidocaine and 2 mL of Methylprednisolone (40 mg/mL). This techniques was performed for the above noted joint/s.    Following Sacroiliac Joint injection, the stylet was replaced and the needle was withdrawn intact. The RIGHT greater trochanter was then identified with fluoroscopy. The targeted site of entry was localized with 1% PF Lidocaine. The above noted spinal needle was advanced to the lateral border of the greater trochanter until the osseus interface was met.  After negative aspiration, 3 mL of the above noted solution was injected. No pain or paresthesia was noted on injection. Following injection, the stylet was replaced and the needle was withdrawn intact. This technique was used for the above noted greater trochanteric bursa / bursae. The skin was cleaned and bandages were applied.    Right Piriformis Muscle Injection:   The fluoroscope was used to crisp up the right sacroiliac joint and the inferior border of the ilium. A 25 G 3.5 inch needle was then advanced under fluoroscopic guidance to contact the ilium after which the needle was withdrawn and redirected slightly anteriorly to lie within the substance of the piriformis muscle. This was confirmed with instillation of 0.5 ml of contrast dye Omnipaque (Iohexamide 240 mg/ml) which delineated the course of the piriformis muscle diagonally from the origin on the anteroinferior sacrum to the insertion on the greater trochanter of the femur after negative aspiration for blood or air. After confirming good spread of the contrast, 0.5 ml Methylprednisolone (40 mg/mL) and 2 mL of 2% PF Lidocaine  was injected. The  needle was then removed intact.        Description of Findings: Not applicable    Prosthetic devices, grafts, tissues, or devices implanted: None    Specimen Removed: No    Estimated Blood Loss: minimal    COMPLICATIONS: None    DISPOSITION / PLANS: The patient was transferred to the recovery area in a stable condition for observation. The patient was reexamined prior to discharge. There was no evidence of acute neurologic injury following the procedure.  Patient was discharged from the recovery room after meeting discharge criteria. Home discharge instructions were given to the patient by the staff.

## 2020-10-22 NOTE — DISCHARGE SUMMARY
[facility]  Short Stay  Discharge Summary    Admit Date: 10/21/2020    Discharge Date and Time: 10/21/2020  9:50 AM      Discharge Attending Physician: KATJA Desai MD     Hospital Course (synopsis of major diagnoses, care, treatment, and services provided during the course of the hospital stay): Patient was admitted to Pre-op where informed consent was signed.  The patient was then taken to the procedure suite where the procedure was performed.  The patient was then return to the Pre-Op area and discharge was performed.     Final Diagnoses:    Principal Problem: <principal problem not specified>   Secondary Diagnoses:   Active Hospital Problems    Diagnosis  POA    Sacroiliac joint dysfunction [M53.3]  Yes      Resolved Hospital Problems   No resolved problems to display.       Discharged Condition: good    Disposition: Home or Self Care    Follow up/Patient Instructions:    Medications:  Reconciled Home Medications:      Medication List      CONTINUE taking these medications    albuterol 90 mcg/actuation inhaler  Commonly known as: PROVENTIL/VENTOLIN HFA  INHALE 2 PUFFS INTO THE LUNGS EVERY 6 HOURS AS NEEDED FOR WHEEZING. RESCUE     atorvastatin 40 MG tablet  Commonly known as: LIPITOR  TAKE 1 TABLET(40 MG) BY MOUTH EVERY EVENING     BIOTIN ORAL  Take by mouth every evening.     cyclobenzaprine 10 MG tablet  Commonly known as: FLEXERIL  Take 1/2 to 1 tab TID PRN muscle spasms. May cause drowsiness.     ergocalciferol 50,000 unit Cap  Commonly known as: ERGOCALCIFEROL  TAKE 1 CAPSULE BY MOUTH EVERY 7 DAYS     famotidine 20 MG tablet  Commonly known as: PEPCID  TAKE 1 TABLET(20 MG) BY MOUTH TWICE DAILY     furosemide 40 MG tablet  Commonly known as: LASIX  TAKE 1 TABLET BY MOUTH DAILY     * gabapentin 300 MG capsule  Commonly known as: NEURONTIN  Take 3 capsules (900 mg total) by mouth 2 (two) times daily.     * gabapentin 300 MG capsule  Commonly known as: NEURONTIN  Take 1 capsule (300 mg  total) by mouth 3 (three) times daily. To take with 600mg to equal 900mg dose.     * gabapentin 600 MG tablet  Commonly known as: NEURONTIN  TAKE 1 TABLET(600 MG) BY MOUTH THREE TIMES DAILY WITH 300 MG     levocetirizine 5 MG tablet  Commonly known as: XYZAL  TAKE 1 TABLET(5 MG) BY MOUTH EVERY EVENING     magnesium oxide 400 mg (241.3 mg magnesium) tablet  Commonly known as: MAG-OX  TAKE 2 TABLETS BY MOUTH TWICE DAILY.     NIFEdipine 30 MG (OSM) 24 hr tablet  Commonly known as: PROCARDIA-XL  TAKE 1 TABLET(30 MG) BY MOUTH EVERY DAY     ondansetron 4 MG Tbdl  Commonly known as: ZOFRAN-ODT  DISSOLVE 1 TO 2 TABLETS ON THE TONGUE THREE TIMES DAILY AS NEEDED FOR NAUSEA     potassium chloride 10 MEQ Tbsr  Commonly known as: KLOR-CON  Take 2 tablets (20 mEq total) by mouth once daily.     STOOL SOFTENER-LAXATIVE 8.6-50 mg per tablet  Generic drug: senna-docusate 8.6-50 mg  Take 1 tablet by mouth 2 (two) times daily. Take while taking narcotic pain medications     tacrolimus 1 MG Cap  Commonly known as: PROGRAF  Take 2 capsules (2 mg total) by mouth every morning AND 1 capsule (1 mg total) every evening.         * This list has 3 medication(s) that are the same as other medications prescribed for you. Read the directions carefully, and ask your doctor or other care provider to review them with you.              Discharge Procedure Orders   Diet general     Call MD for:  severe uncontrolled pain     Call MD for:  difficulty breathing, headache or visual disturbances     Call MD for:  redness, tenderness, or signs of infection (pain, swelling, redness, odor or green/yellow discharge around incision site)     Activity as tolerated

## 2020-10-23 RX ORDER — ERGOCALCIFEROL 1.25 MG/1
50000 CAPSULE ORAL
Qty: 12 CAPSULE | Refills: 0 | OUTPATIENT
Start: 2020-10-23

## 2020-10-23 NOTE — TELEPHONE ENCOUNTER
ACTION NEEDED: Please notify patient that this refill request was not approved for the reason(s) listed below. Schedule her for lab (listed below) on 10/30/2020 (same day as COVID testing). Tell her we will address refills at her next appointment with me on Tuesday, November 3, 2020 at 10:00 AM. Thanks.     REFILL REFUSED  REASON: Vitamin D level (lab) required for further refills.    Orders Placed This Encounter   Procedures    Vitamin D       Future Appointments   Date Time Provider Department Center   10/30/2020  8:20 AM COVID TESTING, [facility] [place]   11/3/2020 10:00 AM KATJA Munoz MD [facility] [place]       Requested Prescriptions     Refused Prescriptions Disp Refills    ergocalciferol (ERGOCALCIFEROL) 50,000 unit Cap 12 capsule 0     Sig: Take 1 capsule (50,000 Units total) by mouth every 7 days.     Refused By: ETHAN MUNOZ     Reason for Refusal: Patient needs an appointment    #LMRX

## 2020-10-26 NOTE — TELEPHONE ENCOUNTER
Called and left a voicemail explaining that Dr. Munoz needs a new lab done before he can refill her vitamin D and that I have scheduled it here at [facility] for the same day as her other appointment on 10/30/2020.

## 2020-10-28 ENCOUNTER — PATIENT MESSAGE (OUTPATIENT)
Dept: INTERNAL MEDICINE | Facility: CLINIC | Age: 55
End: 2020-10-28

## 2020-10-28 DIAGNOSIS — E55.9 VITAMIN D DEFICIENCY: ICD-10-CM

## 2020-10-28 RX ORDER — ERGOCALCIFEROL 1.25 MG/1
50000 CAPSULE ORAL
Qty: 12 CAPSULE | Refills: 0 | OUTPATIENT
Start: 2020-10-28

## 2020-10-29 NOTE — TELEPHONE ENCOUNTER
REFILL REFUSED  REASON: She is overdue for a follow-up appointment. Appointment for re-evaluation is needed prior to additional refills.    Requested Prescriptions     Refused Prescriptions Disp Refills    ergocalciferol (ERGOCALCIFEROL) 50,000 unit Cap 12 capsule 0     Sig: Take 1 capsule (50,000 Units total) by mouth every 7 days.     Refused By: ETHAN ROSE     Reason for Refusal: Patient needs an appointment    #LMRX

## 2020-10-30 ENCOUNTER — LAB VISIT (OUTPATIENT)
Dept: LAB | Facility: HOSPITAL | Age: 55
End: 2020-10-30
Attending: FAMILY MEDICINE
Payer: MEDICAID

## 2020-10-30 DIAGNOSIS — E55.9 VITAMIN D DEFICIENCY: ICD-10-CM

## 2020-10-30 LAB — 25(OH)D3+25(OH)D2 SERPL-MCNC: 33 NG/ML (ref 30–96)

## 2020-10-30 PROCEDURE — 36415 COLL VENOUS BLD VENIPUNCTURE: CPT

## 2020-10-30 PROCEDURE — 82306 VITAMIN D 25 HYDROXY: CPT

## 2020-11-02 ENCOUNTER — HOSPITAL ENCOUNTER (OUTPATIENT)
Dept: SLEEP MEDICINE | Facility: HOSPITAL | Age: 55
Discharge: HOME | End: 2020-11-02
Attending: STUDENT IN AN ORGANIZED HEALTH CARE EDUCATION/TRAINING PROGRAM
Payer: MEDICAID

## 2020-11-02 DIAGNOSIS — G47.33 OSA (OBSTRUCTIVE SLEEP APNEA): ICD-10-CM

## 2020-11-02 PROCEDURE — 95810 PR POLYSOMNOGRAPHY, 4 OR MORE: ICD-10-PCS | Mod: 26,,, | Performed by: INTERNAL MEDICINE

## 2020-11-02 PROCEDURE — 95810 POLYSOM 6/> YRS 4/> PARAM: CPT

## 2020-11-02 PROCEDURE — 95810 POLYSOM 6/> YRS 4/> PARAM: CPT | Mod: 26,,, | Performed by: INTERNAL MEDICINE

## 2020-11-02 NOTE — Clinical Note
Mild Obstructive Sleep apnea(BRANNON): overall AHI was 8.8/hr . 53 events.  EKG showed no cardiac abnormalities.  EEG did not show alpha intrusion.  The patient snored with soft snoring volume.  Significant periodic leg movements(PLMs) during sleep. However, mild significant associated arousals.  No Significant Central Sleep Apnea (CSA)  Reduced sleep efficiency, long primary sleep latency, long REM sleep latency and short slow wave latency.  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

## 2020-11-02 NOTE — PROGRESS NOTES
"Marcie Scott.    I'm happy to report that these results are fine and do not require a change in treatment.    Thanks for letting me care for you, thanks for trusting us with your healthcare needs, and thanks for using [facility].    Sincerely,    KATJA Munoz MD  --------------------------------------------------------------------------------   Want to learn more about your test results and what they mean?  (1) Log in to your [facility] account at [email or web address].  (2) From the "View test results" tab, click on the test you want to know more about.  (3) Click on the "About This Test" link."

## 2020-11-03 ENCOUNTER — OFFICE VISIT (OUTPATIENT)
Dept: HEMATOLOGY/ONCOLOGY | Facility: CLINIC | Age: 55
End: 2020-11-03
Payer: MEDICAID

## 2020-11-03 ENCOUNTER — LAB VISIT (OUTPATIENT)
Dept: LAB | Facility: HOSPITAL | Age: 55
End: 2020-11-03
Attending: INTERNAL MEDICINE
Payer: MEDICAID

## 2020-11-03 ENCOUNTER — OFFICE VISIT (OUTPATIENT)
Dept: INTERNAL MEDICINE | Facility: CLINIC | Age: 55
End: 2020-11-03
Payer: MEDICAID

## 2020-11-03 VITALS
OXYGEN SATURATION: 98 % | WEIGHT: 201.75 LBS | SYSTOLIC BLOOD PRESSURE: 138 MMHG | BODY MASS INDEX: 33.61 KG/M2 | TEMPERATURE: 98 F | HEIGHT: 65 IN | DIASTOLIC BLOOD PRESSURE: 87 MMHG | HEART RATE: 92 BPM

## 2020-11-03 VITALS
DIASTOLIC BLOOD PRESSURE: 80 MMHG | OXYGEN SATURATION: 99 % | HEIGHT: 65 IN | BODY MASS INDEX: 33.57 KG/M2 | HEART RATE: 91 BPM | TEMPERATURE: 98 F | WEIGHT: 201.5 LBS | SYSTOLIC BLOOD PRESSURE: 126 MMHG

## 2020-11-03 DIAGNOSIS — I10 ESSENTIAL HYPERTENSION: Primary | Chronic | ICD-10-CM

## 2020-11-03 DIAGNOSIS — E04.2 MULTINODULAR THYROID: ICD-10-CM

## 2020-11-03 DIAGNOSIS — K22.10 EROSIVE ESOPHAGITIS: ICD-10-CM

## 2020-11-03 DIAGNOSIS — E78.2 MIXED HYPERLIPIDEMIA: ICD-10-CM

## 2020-11-03 DIAGNOSIS — R63.4 UNINTENTIONAL WEIGHT LOSS OF 10% BODY WEIGHT WITHIN 6 MONTHS: ICD-10-CM

## 2020-11-03 DIAGNOSIS — K21.00 GASTROESOPHAGEAL REFLUX DISEASE WITH ESOPHAGITIS WITHOUT HEMORRHAGE: ICD-10-CM

## 2020-11-03 DIAGNOSIS — Z23 NEED FOR SHINGLES VACCINE: ICD-10-CM

## 2020-11-03 DIAGNOSIS — E55.9 VITAMIN D DEFICIENCY: ICD-10-CM

## 2020-11-03 DIAGNOSIS — I10 ESSENTIAL HYPERTENSION: ICD-10-CM

## 2020-11-03 DIAGNOSIS — D64.9 ANEMIA, UNSPECIFIED TYPE: Primary | ICD-10-CM

## 2020-11-03 DIAGNOSIS — G47.33 OSA (OBSTRUCTIVE SLEEP APNEA): ICD-10-CM

## 2020-11-03 DIAGNOSIS — D64.9 ANEMIA, UNSPECIFIED TYPE: ICD-10-CM

## 2020-11-03 PROBLEM — D84.9 IMMUNOSUPPRESSION: Status: ACTIVE | Noted: 2018-01-02

## 2020-11-03 LAB
ALBUMIN SERPL BCP-MCNC: 3.6 G/DL (ref 3.5–5.2)
ALP SERPL-CCNC: 214 U/L (ref 55–135)
ALT SERPL W/O P-5'-P-CCNC: 41 U/L (ref 10–44)
ANION GAP SERPL CALC-SCNC: 8 MMOL/L (ref 8–16)
AST SERPL-CCNC: 49 U/L (ref 10–40)
BASOPHILS # BLD AUTO: 0.07 K/UL (ref 0–0.2)
BASOPHILS NFR BLD: 0.4 % (ref 0–1.9)
BILIRUB SERPL-MCNC: 0.4 MG/DL (ref 0.1–1)
BUN SERPL-MCNC: 10 MG/DL (ref 6–20)
CALCIUM SERPL-MCNC: 9.1 MG/DL (ref 8.7–10.5)
CHLORIDE SERPL-SCNC: 103 MMOL/L (ref 95–110)
CO2 SERPL-SCNC: 28 MMOL/L (ref 23–29)
CREAT SERPL-MCNC: 1 MG/DL (ref 0.5–1.4)
DIFFERENTIAL METHOD: ABNORMAL
EOSINOPHIL # BLD AUTO: 0.1 K/UL (ref 0–0.5)
EOSINOPHIL NFR BLD: 0.6 % (ref 0–8)
ERYTHROCYTE [DISTWIDTH] IN BLOOD BY AUTOMATED COUNT: 16.1 % (ref 11.5–14.5)
EST. GFR  (AFRICAN AMERICAN): >60 ML/MIN/1.73 M^2
EST. GFR  (NON AFRICAN AMERICAN): >60 ML/MIN/1.73 M^2
GLUCOSE SERPL-MCNC: 90 MG/DL (ref 70–110)
HCT VFR BLD AUTO: 40.1 % (ref 37–48.5)
HGB BLD-MCNC: 12.3 G/DL (ref 12–16)
IMM GRANULOCYTES # BLD AUTO: 0.1 K/UL (ref 0–0.04)
IMM GRANULOCYTES NFR BLD AUTO: 0.6 % (ref 0–0.5)
LYMPHOCYTES # BLD AUTO: 2.7 K/UL (ref 1–4.8)
LYMPHOCYTES NFR BLD: 17.3 % (ref 18–48)
MCH RBC QN AUTO: 23.5 PG (ref 27–31)
MCHC RBC AUTO-ENTMCNC: 30.7 G/DL (ref 32–36)
MCV RBC AUTO: 77 FL (ref 82–98)
MONOCYTES # BLD AUTO: 0.8 K/UL (ref 0.3–1)
MONOCYTES NFR BLD: 5.3 % (ref 4–15)
NEUTROPHILS # BLD AUTO: 12 K/UL (ref 1.8–7.7)
NEUTROPHILS NFR BLD: 75.8 % (ref 38–73)
NRBC BLD-RTO: 0 /100 WBC
PLATELET # BLD AUTO: 320 K/UL (ref 150–350)
PMV BLD AUTO: 9.2 FL (ref 9.2–12.9)
POTASSIUM SERPL-SCNC: 3.5 MMOL/L (ref 3.5–5.1)
PROT SERPL-MCNC: 7.7 G/DL (ref 6–8.4)
RBC # BLD AUTO: 5.23 M/UL (ref 4–5.4)
SODIUM SERPL-SCNC: 139 MMOL/L (ref 136–145)
WBC # BLD AUTO: 15.8 K/UL (ref 3.9–12.7)

## 2020-11-03 PROCEDURE — 99214 OFFICE O/P EST MOD 30 MIN: CPT | Mod: S$PBB,,, | Performed by: INTERNAL MEDICINE

## 2020-11-03 PROCEDURE — 99214 OFFICE O/P EST MOD 30 MIN: CPT | Mod: PBBFAC,27 | Performed by: FAMILY MEDICINE

## 2020-11-03 PROCEDURE — 99999 PR PBB SHADOW E&M-EST. PATIENT-LVL IV: CPT | Mod: PBBFAC,,, | Performed by: FAMILY MEDICINE

## 2020-11-03 PROCEDURE — 99999 PR PBB SHADOW E&M-EST. PATIENT-LVL III: CPT | Mod: PBBFAC,,, | Performed by: INTERNAL MEDICINE

## 2020-11-03 PROCEDURE — 99999 PR PBB SHADOW E&M-EST. PATIENT-LVL III: ICD-10-PCS | Mod: PBBFAC,,, | Performed by: INTERNAL MEDICINE

## 2020-11-03 PROCEDURE — 36415 COLL VENOUS BLD VENIPUNCTURE: CPT

## 2020-11-03 PROCEDURE — 99213 OFFICE O/P EST LOW 20 MIN: CPT | Mod: PBBFAC | Performed by: INTERNAL MEDICINE

## 2020-11-03 PROCEDURE — 99999 PR PBB SHADOW E&M-EST. PATIENT-LVL IV: ICD-10-PCS | Mod: PBBFAC,,, | Performed by: FAMILY MEDICINE

## 2020-11-03 PROCEDURE — 99214 OFFICE O/P EST MOD 30 MIN: CPT | Mod: S$PBB,,, | Performed by: FAMILY MEDICINE

## 2020-11-03 PROCEDURE — 99214 PR OFFICE/OUTPT VISIT, EST, LEVL IV, 30-39 MIN: ICD-10-PCS | Mod: S$PBB,,, | Performed by: INTERNAL MEDICINE

## 2020-11-03 PROCEDURE — 99214 PR OFFICE/OUTPT VISIT, EST, LEVL IV, 30-39 MIN: ICD-10-PCS | Mod: S$PBB,,, | Performed by: FAMILY MEDICINE

## 2020-11-03 PROCEDURE — 80053 COMPREHEN METABOLIC PANEL: CPT

## 2020-11-03 PROCEDURE — 85025 COMPLETE CBC W/AUTO DIFF WBC: CPT

## 2020-11-03 RX ORDER — ERGOCALCIFEROL 1.25 MG/1
50000 CAPSULE ORAL
Qty: 12 CAPSULE | Refills: 4 | Status: SHIPPED | OUTPATIENT
Start: 2020-11-03 | End: 2021-12-19

## 2020-11-03 RX ORDER — ATORVASTATIN CALCIUM 40 MG/1
40 TABLET, FILM COATED ORAL NIGHTLY
Qty: 90 TABLET | Refills: 4 | Status: SHIPPED | OUTPATIENT
Start: 2020-11-03 | End: 2021-11-18

## 2020-11-03 RX ORDER — NIFEDIPINE 30 MG/1
30 TABLET, EXTENDED RELEASE ORAL NIGHTLY
Qty: 90 TABLET | Refills: 4 | Status: SHIPPED | OUTPATIENT
Start: 2020-11-03 | End: 2021-08-03

## 2020-11-03 RX ORDER — FAMOTIDINE 20 MG/1
20 TABLET, FILM COATED ORAL 2 TIMES DAILY
Qty: 180 TABLET | Refills: 4 | Status: SHIPPED | OUTPATIENT
Start: 2020-11-03 | End: 2021-09-01 | Stop reason: SDUPTHER

## 2020-11-03 NOTE — ASSESSMENT & PLAN NOTE
Lab Results   Component Value Date    CHOL 129 04/27/2020    CHOL 214 (H) 11/04/2019    TRIG 128 04/27/2020    TRIG 78 11/04/2019    HDL 34 (L) 04/27/2020    HDL 55 11/04/2019    LDLCALC 69.4 04/27/2020    LDLCALC 143.4 11/04/2019    NONHDLCHOL 95 04/27/2020    NONHDLCHOL 159 11/04/2019    AST 49 (H) 11/03/2020    ALT 41 11/03/2020     Well controlled, improved. She appears to be tolerating statin for dyslipidemia without apparent symptoms of myositis.

## 2020-11-03 NOTE — PROGRESS NOTES
CHIEF COMPLAINT  Establish Care and Medication Refill    Marcie is requesting that I take over as her primary care provider.     DIAGNOSES, HISTORY, ASSESSMENT, AND PLAN  Assessment   1. Essential hypertension    2. Vitamin D deficiency    3. Mixed hyperlipidemia    4. Erosive esophagitis    5. Multinodular thyroid    6. BRANNON (obstructive sleep apnea)    7. Gastroesophageal reflux disease with esophagitis without hemorrhage    8. Need for shingles vaccine         Orders Placed This Encounter    ergocalciferol (ERGOCALCIFEROL) 50,000 unit Cap    atorvastatin (LIPITOR) 40 MG tablet    NIFEdipine (PROCARDIA-XL) 30 MG (OSM) 24 hr tablet    famotidine (PEPCID) 20 MG tablet    varicella-zoster gE-AS01B, PF, (SHINGRIX) 50 mcg/0.5 mL injection    pantoprazole (PROTONIX) 40 MG tablet       Except as noted herein, any chronic conditions are compensated/controlled and stable, and no other significant new complaints or concerns reported.     Plan   Problem List Items Addressed This Visit     Erosive esophagitis    Overview     Overview:   H/O gastroparesis/prepyloric ulcer/erosive esophagitis         Current Assessment & Plan     Asymptomatic on famotidine 20 mg BID. Why not on PPI? I will query Dr. Souza.         Relevant Medications    pantoprazole (PROTONIX) 40 MG tablet    Essential hypertension - Primary (Chronic)    Current Assessment & Plan     BP Readings from Last 6 Encounters:   11/03/20 126/80   11/03/20 138/87   10/21/20 138/82   10/12/20 120/80   10/08/20 (!) 137/92   09/11/20 124/84     Last 5 Patient Entered Readings                                      Current 30 Day Average:      There is no flowsheet data to display.         Well controlled, stable.          Relevant Medications    NIFEdipine (PROCARDIA-XL) 30 MG (OSM) 24 hr tablet    Gastroesophageal reflux disease with esophagitis without hemorrhage    Current Assessment & Plan     Symptoms controlled on famotidine 20 mg BID.         Relevant Medications     famotidine (PEPCID) 20 MG tablet    pantoprazole (PROTONIX) 40 MG tablet    Mixed hyperlipidemia    Current Assessment & Plan     Lab Results   Component Value Date    CHOL 129 04/27/2020    CHOL 214 (H) 11/04/2019    TRIG 128 04/27/2020    TRIG 78 11/04/2019    HDL 34 (L) 04/27/2020    HDL 55 11/04/2019    LDLCALC 69.4 04/27/2020    LDLCALC 143.4 11/04/2019    NONHDLCHOL 95 04/27/2020    NONHDLCHOL 159 11/04/2019    AST 49 (H) 11/03/2020    ALT 41 11/03/2020     Well controlled, improved. She appears to be tolerating statin for dyslipidemia without apparent symptoms of myositis.           Relevant Medications    atorvastatin (LIPITOR) 40 MG tablet    Multinodular thyroid    Overview     US SOFT TISSUE HEAD NECK THYROID 06/08/2020  FINDINGS: Overall gland volume measures 6.5 cc. The right lobe measures 3.8 x 1.4 x 1.2 cm. Left lobe measures 3.6 x 1.3 x 1.3 cm. There is a colloid cystic nodule measuring 5 mm in the upper right lobe. There is a solid heterogeneous nodule measuring up to 8 mm in the midportion of the right lobe. Within the inferior portion of the right lobe there is a 1.6 cm solid nodule which is isoechoic to the gland. Within the isthmus there is also a 1.6 cm nodule with similar characteristics. Per ACR TI rads criteria, follow-up in 1, 3, and 5 years is suggested for the aforementioned nodules.  Nodules are seen in the left lobe as well. For example, there is a solid isoechoic nodule with hypoechoic halo in the midportion of the gland that measures up to 1.4 cm. A mixed cystic and solid nodule seen in the lower portion of the left lobe measuring just under 1 cm.  IMPRESSION: Right lower lobe in isthmus nodules requiring follow-up as above. No nodule meeting criteria for fine-needle aspiration at this time.         Current Assessment & Plan     Asymptomatic. Clinically stable.          BRANNON (obstructive sleep apnea)    Current Assessment & Plan     Polysomnography completed this morning. Awaiting  results.         Vitamin D deficiency    Current Assessment & Plan     Lab Results   Component Value Date    [id number] 33 10/30/2020    [id number] 21 (L) 09/19/2018    [id number] 6 (L) 12/27/2017   Therapeutic on chronic Vitamin D2 50K IU Q7D.          Relevant Medications    ergocalciferol (ERGOCALCIFEROL) 50,000 unit Cap      Other Visit Diagnoses     Need for shingles vaccine        Relevant Medications    varicella-zoster gE-AS01B, PF, (SHINGRIX) 50 mcg/0.5 mL injection          Outpatient Medications Prior to Visit   Medication Sig Dispense Refill    BIOTIN ORAL Take by mouth every evening.       cyclobenzaprine (FLEXERIL) 10 MG tablet Take 1/2 to 1 tab TID PRN muscle spasms. May cause drowsiness. 90 tablet 0    furosemide (LASIX) 40 MG tablet TAKE 1 TABLET BY MOUTH DAILY 30 tablet 2    gabapentin (NEURONTIN) 300 MG capsule Take 3 capsules (900 mg total) by mouth 2 (two) times daily. 120 capsule 3    gabapentin (NEURONTIN) 600 MG tablet TAKE 1 TABLET(600 MG) BY MOUTH THREE TIMES DAILY WITH 300 MG 90 tablet 1    magnesium oxide (MAG-OX) 400 mg (241.3 mg magnesium) tablet TAKE 2 TABLETS BY MOUTH TWICE DAILY. 270 tablet 2    potassium chloride (KLOR-CON) 10 MEQ TbSR Take 2 tablets (20 mEq total) by mouth once daily. 60 tablet 2    senna-docusate 8.6-50 mg (PERICOLACE) 8.6-50 mg per tablet Take 1 tablet by mouth 2 (two) times daily. Take while taking narcotic pain medications (Patient not taking: Reported on 11/18/2020) 28 tablet 0    tacrolimus (PROGRAF) 1 MG Cap Take 2 capsules (2 mg total) by mouth every morning AND 1 capsule (1 mg total) every evening. 90 capsule 11    albuterol (PROVENTIL/VENTOLIN HFA) 90 mcg/actuation inhaler INHALE 2 PUFFS INTO THE LUNGS EVERY 6 HOURS AS NEEDED FOR WHEEZING. RESCUE 18 g 3    atorvastatin (LIPITOR) 40 MG tablet TAKE 1 TABLET(40 MG) BY MOUTH EVERY EVENING 90 tablet 3    ergocalciferol (ERGOCALCIFEROL) 50,000 unit Cap TAKE 1 CAPSULE BY MOUTH EVERY 7 DAYS 12  capsule 0    famotidine (PEPCID) 20 MG tablet TAKE 1 TABLET(20 MG) BY MOUTH TWICE DAILY 60 tablet 5    NIFEdipine (PROCARDIA-XL) 30 MG (OSM) 24 hr tablet TAKE 1 TABLET(30 MG) BY MOUTH EVERY DAY 90 tablet 3    levocetirizine (XYZAL) 5 MG tablet TAKE 1 TABLET(5 MG) BY MOUTH EVERY EVENING 30 tablet 0    gabapentin (NEURONTIN) 300 MG capsule Take 1 capsule (300 mg total) by mouth 3 (three) times daily. To take with 600mg to equal 900mg dose. (Patient not taking: Reported on 11/3/2020) 90 capsule 1    ondansetron (ZOFRAN-ODT) 4 MG TbDL DISSOLVE 1 TO 2 TABLETS ON THE TONGUE THREE TIMES DAILY AS NEEDED FOR NAUSEA 20 tablet 0     No facility-administered medications prior to visit.         Medications Discontinued During This Encounter   Medication Reason    gabapentin (NEURONTIN) 300 MG capsule Duplicate Order    albuterol (PROVENTIL/VENTOLIN HFA) 90 mcg/actuation inhaler Patient no longer taking    ondansetron (ZOFRAN-ODT) 4 MG TbDL Patient no longer taking    famotidine (PEPCID) 20 MG tablet Reorder    ergocalciferol (ERGOCALCIFEROL) 50,000 unit Cap Reorder    NIFEdipine (PROCARDIA-XL) 30 MG (OSM) 24 hr tablet Reorder    atorvastatin (LIPITOR) 40 MG tablet Reorder        Medications Ordered This Encounter   Medications    atorvastatin (LIPITOR) 40 MG tablet     Sig: Take 1 tablet (40 mg total) by mouth every evening.     Dispense:  90 tablet     Refill:  4     This REPLACES any prior prescription for this drug. DISCONTINUE any prior prescription for this drug.    ergocalciferol (ERGOCALCIFEROL) 50,000 unit Cap     Sig: Take 1 capsule (50,000 Units total) by mouth every 7 days.     Dispense:  12 capsule     Refill:  4     This REPLACES any prior prescription for this drug. DISCONTINUE any prior prescription for this drug.    famotidine (PEPCID) 20 MG tablet     Sig: Take 1 tablet (20 mg total) by mouth 2 (two) times a day.     Dispense:  180 tablet     Refill:  4     This REPLACES any prior prescription for  "this drug. DISCONTINUE any prior prescription for this drug.    NIFEdipine (PROCARDIA-XL) 30 MG (OSM) 24 hr tablet     Sig: Take 1 tablet (30 mg total) by mouth every evening.     Dispense:  90 tablet     Refill:  4     This REPLACES any prior prescription for this drug. DISCONTINUE any prior prescription for this drug.    pantoprazole (PROTONIX) 40 MG tablet     Sig: Take 1 tablet (40 mg total) by mouth every morning.     Dispense:  90 tablet     Refill:  4    varicella-zoster gE-AS01B, PF, (SHINGRIX) 50 mcg/0.5 mL injection     Sig: Inject 0.5 mL (one dose) into muscle now; give second dose at least 2 months later     Dispense:  1 each     Refill:  1       Subjective   Review of Systems   Constitutional: Negative for chills and fever.   Respiratory: Negative for chest tightness and shortness of breath.    Endocrine: Negative for polydipsia and polyuria.        Objective   Vitals:    11/03/20 0959   BP: 126/80   BP Location: Left arm   Patient Position: Sitting   BP Method: Large (Manual)   Pulse: 91   Temp: 97.9 °F (36.6 °C)   TempSrc: Temporal   SpO2: 99%   Weight: 91.4 kg (201 lb 8 oz)   Height: 5' 5" (1.651 m)     Physical Exam  Vitals signs reviewed.   Constitutional:       General: She is not in acute distress.     Appearance: Normal appearance. She is not ill-appearing, toxic-appearing or diaphoretic.   HENT:      Head: Normocephalic and atraumatic.   Eyes:      General: No scleral icterus.  Cardiovascular:      Rate and Rhythm: Normal rate.   Pulmonary:      Effort: Pulmonary effort is normal.   Neurological:      General: No focal deficit present.      Mental Status: She is alert and oriented to person, place, and time.   Psychiatric:         Mood and Affect: Mood normal.         Behavior: Behavior normal.         Judgment: Judgment normal.         No images are attached to the encounter.  Documentation entered by me for this encounter may have been done in part using speech-recognition technology. " "Although I have made an effort to ensure accuracy, "sound like" errors may exist and should be interpreted in context. -KATJA Munoz MD.    "

## 2020-11-03 NOTE — ASSESSMENT & PLAN NOTE
Lab Results   Component Value Date    [id number] 33 10/30/2020    [id number] 21 (L) 09/19/2018    [id number] 6 (L) 12/27/2017   Therapeutic on chronic Vitamin D2 50K IU Q7D.

## 2020-11-03 NOTE — ASSESSMENT & PLAN NOTE
BP Readings from Last 6 Encounters:   11/03/20 126/80   11/03/20 138/87   10/21/20 138/82   10/12/20 120/80   10/08/20 (!) 137/92   09/11/20 124/84     Last 5 Patient Entered Readings                                      Current 30 Day Average:      There is no flowsheet data to display.         Well controlled, stable.

## 2020-11-03 NOTE — PROGRESS NOTES
Subjective:   Date of Visit: 11/3/20   ?   ?    REFERRING PROVIDER: No referring provider defined for this encounter.   ?   CHIEF COMPLAINT:  Unintentional weight loss and night sweats. ???????       HPI:  55-year-old female with history of arthritis, alcohol abuse with end-stage liver disease, hypertension, hyperlipidemia, questionable liver cancer status post liver transplant in 2017, presents to us as a referral due to recent unintentional weight loss with associated abdominal pain and diarrhea as well as shortness of breath.    CT scan of the abdomen and pelvis showed atherosclerotic calcification of the abdominal aorta without aneurysmal dilatation, no acute abdominal abnormality.    PET-CT scan showed no suspicious foci of increased FDG avidity.  She was last seen on 04/16/2020.  Presents today for routine follow-up.    Overall feels well.  No complaints.      Review of Systems   Constitutional: Positive for fatigue. Negative for activity change, appetite change, chills and fever.   HENT: Negative for hearing loss, mouth sores, nosebleeds, sore throat, tinnitus, trouble swallowing and voice change.    Eyes: Negative for visual disturbance.   Respiratory: Negative for cough and chest tightness.    Cardiovascular: Negative for chest pain and leg swelling.   Gastrointestinal: Negative for abdominal pain, anal bleeding, blood in stool, constipation, nausea and vomiting.   Genitourinary: Negative for dysuria, frequency, hematuria, pelvic pain, vaginal bleeding and vaginal pain.   Musculoskeletal: Negative for arthralgias, back pain, joint swelling and neck pain.   Skin: Negative for color change, pallor, rash and wound.   Allergic/Immunologic: Negative for immunocompromised state.   Neurological: Negative for dizziness, tremors, syncope, speech difficulty, light-headedness and headaches.   Hematological: Negative for adenopathy. Does not bruise/bleed easily.   Psychiatric/Behavioral: Negative for agitation,  confusion, decreased concentration, hallucinations and sleep disturbance. The patient is not nervous/anxious.        ?   PAST MEDICAL HISTORY:   Past Medical History:   Diagnosis Date    Alcohol abuse     Alcoholic hepatitis     Anemia of chronic disease     Arthritis     generialized    Cancer 12/26/2017    liver    Coagulopathy     Dyspnea on exertion 3/26/2020    Encounter for blood transfusion     End stage liver disease     History of alcohol abuse     Hyperlipidemia     Hypersomnia 9/11/2020    Hypertension     Hyponatremia     Liver cirrhosis     Liver transplant candidate 12/26/2017    Obesity (BMI 30-39.9) 1/5/2016    ?     PAST SURGICAL HISTORY:   Past Surgical History:   Procedure Laterality Date    BREAST BIOPSY Left     benign    CHOLECYSTECTOMY      COLONOSCOPY N/A 12/1/2017    Procedure: COLONOSCOPY;  Surgeon: Filemon Fuentes MD;  Location: [address]);  Service: Endoscopy;  Laterality: N/A;    COLONOSCOPY N/A 12/5/2017    Procedure: COLONOSCOPY;  Surgeon: José Chavira MD;  Location: [facility] ([address] [place] [address]);  Service: Endoscopy;  Laterality: N/A;    EXAMINATION UNDER ANESTHESIA N/A 2/18/2020    Procedure: EXAM UNDER ANESTHESIA;  Surgeon: Alden Horowitz MD;  Location: [facility] OR;  Service: General;  Laterality: N/A;    EXCISIONAL HEMORRHOIDECTOMY N/A 2/18/2020    Procedure: HEMORRHOIDECTOMY;  Surgeon: Alden Horowitz MD;  Location: [facility] OR;  Service: General;  Laterality: N/A;    HYSTERECTOMY      complete     INJECTION OF ANESTHETIC AGENT AROUND PUDENDAL NERVE N/A 2/18/2020    Procedure: BLOCK, NERVE, PUDENDAL;  Surgeon: Alden Horowitz MD;  Location: [facility] OR;  Service: General;  Laterality: N/A;    INJECTION OF ANESTHETIC AGENT INTO SACROILIAC JOINT Right 6/14/2019    Procedure: right Sacroiliac Joint Injection;  Surgeon: David Desai MD;  Location: [facility];  Service: Pain Management;  Laterality: Right;    INJECTION OF ANESTHETIC AGENT INTO  SACROILIAC JOINT Bilateral 2/7/2020    Procedure: Bilateral GT bursa + Bilateral Sacroiliac Joint Injection;  Surgeon: David Desai MD;  [facility]IN [facility]GT;  Service: Pain Management;  Laterality: Bilateral;    INJECTION OF ANESTHETIC AGENT INTO SACROILIAC JOINT Bilateral 7/7/2020    Procedure: Bilateral GT bursa +SIJ injection;  Surgeon: David Desai MD;  [facility];  Service: Pain Management;  Laterality: Bilateral;    INJECTION OF ANESTHETIC AGENT INTO SACROILIAC JOINT Right 10/21/2020    Procedure: Right piriformis + right SIJ + right GT bursa injection;  Surgeon: David Desai MD;  [facility];  Service: Pain Management;  Laterality: Right;    INJECTION OF JOINT Bilateral 2/7/2020    Procedure: Bilateral GT bursa + Bilateral Sacroiliac Joint Injection;  Surgeon: David Desai MD;  [facility];  Service: Pain Management;  Laterality: Bilateral;    INJECTION OF JOINT Bilateral 7/7/2020    Procedure: Bilateral GT bursa +SIJ injection;  Surgeon: David Desai MD;  [facility];  Service: Pain Management;  Laterality: Bilateral;    INJECTION OF JOINT Right 10/21/2020    Procedure: Right piriformis + right SIJ + right GT bursa injection;  Surgeon: David Desai MD;  [facility];  Service: Pain Management;  Laterality: Right;    INJECTION OF PIRIFORMIS MUSCLE Right 6/14/2019    Procedure: Right piriformis injection;  Surgeon: David Desai MD;  [facility];  Service: Pain Management;  Laterality: Right;    INJECTION OF PIRIFORMIS MUSCLE Right 10/21/2020    Procedure: Right piriformis + right SIJ + right GT bursa injection;  Surgeon: David Desai MD;  [facility];  Service: Pain Management;  Laterality: Right;    LIVER TRANSPLANT  12/2017      ?   ALLERGIES:   Allergies as of 11/03/2020 - Reviewed 11/03/2020   Allergen Reaction Noted    Ferrous sulfate Other (See Comments) 01/10/2017      ?    MEDICATIONS:?   Outpatient Medications Marked as Taking for the 11/3/20 encounter (Office Visit) with Baldo Kim MD   Medication Sig Dispense Refill    albuterol (PROVENTIL/VENTOLIN HFA) 90 mcg/actuation inhaler INHALE 2 PUFFS INTO THE LUNGS EVERY 6 HOURS AS NEEDED FOR WHEEZING. RESCUE 18 g 3    atorvastatin (LIPITOR) 40 MG tablet TAKE 1 TABLET(40 MG) BY MOUTH EVERY EVENING 90 tablet 3    BIOTIN ORAL Take by mouth every evening.       cyclobenzaprine (FLEXERIL) 10 MG tablet Take 1/2 to 1 tab TID PRN muscle spasms. May cause drowsiness. 90 tablet 0    ergocalciferol (ERGOCALCIFEROL) 50,000 unit Cap TAKE 1 CAPSULE BY MOUTH EVERY 7 DAYS 12 capsule 0    famotidine (PEPCID) 20 MG tablet TAKE 1 TABLET(20 MG) BY MOUTH TWICE DAILY 60 tablet 5    furosemide (LASIX) 40 MG tablet TAKE 1 TABLET BY MOUTH DAILY 30 tablet 2    gabapentin (NEURONTIN) 300 MG capsule Take 3 capsules (900 mg total) by mouth 2 (two) times daily. 120 capsule 3    gabapentin (NEURONTIN) 300 MG capsule Take 1 capsule (300 mg total) by mouth 3 (three) times daily. To take with 600mg to equal 900mg dose. 90 capsule 1    gabapentin (NEURONTIN) 600 MG tablet TAKE 1 TABLET(600 MG) BY MOUTH THREE TIMES DAILY WITH 300 MG 90 tablet 1    levocetirizine (XYZAL) 5 MG tablet TAKE 1 TABLET(5 MG) BY MOUTH EVERY EVENING 30 tablet 0    magnesium oxide (MAG-OX) 400 mg (241.3 mg magnesium) tablet TAKE 2 TABLETS BY MOUTH TWICE DAILY. 270 tablet 2    NIFEdipine (PROCARDIA-XL) 30 MG (OSM) 24 hr tablet TAKE 1 TABLET(30 MG) BY MOUTH EVERY DAY 90 tablet 3    ondansetron (ZOFRAN-ODT) 4 MG TbDL DISSOLVE 1 TO 2 TABLETS ON THE TONGUE THREE TIMES DAILY AS NEEDED FOR NAUSEA 20 tablet 0    potassium chloride (KLOR-CON) 10 MEQ TbSR Take 2 tablets (20 mEq total) by mouth once daily. 60 tablet 2    senna-docusate 8.6-50 mg (PERICOLACE) 8.6-50 mg per tablet Take 1 tablet by mouth 2 (two) times daily. Take while taking narcotic pain medications 28 tablet 0     tacrolimus (PROGRAF) 1 MG Cap Take 2 capsules (2 mg total) by mouth every morning AND 1 capsule (1 mg total) every evening. 90 capsule 11      ?   SOCIAL HISTORY:?   Social History     Tobacco Use    Smoking status: Never Smoker    Smokeless tobacco: Never Used   Substance Use Topics    Alcohol use: No     Frequency: Never     Comment: Currently admitted to inpatient rehab 7/2017        ?   FAMILY HISTORY:   family history includes Alzheimer's disease in her mother; Breast cancer in her paternal aunt; Depression in her maternal grandfather; Diabetes in her father and sister; Hypertension in her father and mother.   ?     Objective:      Physical Exam  Constitutional:       Appearance: She is well-developed. She is not toxic-appearing.   HENT:      Head: Normocephalic and atraumatic.      Mouth/Throat:      Pharynx: No oropharyngeal exudate.   Eyes:      General: No scleral icterus.        Right eye: No discharge.         Left eye: No discharge.      Conjunctiva/sclera: Conjunctivae normal.      Pupils: Pupils are equal, round, and reactive to light.   Neck:      Musculoskeletal: Normal range of motion and neck supple.      Thyroid: No thyromegaly.   Cardiovascular:      Rate and Rhythm: Normal rate and regular rhythm.      Heart sounds: No murmur.   Pulmonary:      Effort: Pulmonary effort is normal. No respiratory distress.      Breath sounds: Normal breath sounds.   Chest:      Chest wall: No tenderness.   Abdominal:      General: Bowel sounds are normal. There is no distension.      Palpations: Abdomen is soft. There is no mass.      Tenderness: There is no abdominal tenderness. There is no guarding or rebound.   Musculoskeletal: Normal range of motion.         General: No tenderness.   Lymphadenopathy:      Cervical: No cervical adenopathy.      Right cervical: No superficial cervical adenopathy.     Left cervical: No superficial cervical adenopathy.      Upper Body:      Right upper body: No supraclavicular  or pectoral adenopathy.      Left upper body: No supraclavicular or pectoral adenopathy.      Lower Body: No right inguinal adenopathy. No left inguinal adenopathy.   Skin:     General: Skin is warm and dry.      Capillary Refill: Capillary refill takes 2 to 3 seconds.      Coloration: Skin is not pale.      Findings: No erythema or rash.   Neurological:      Mental Status: She is alert and oriented to person, place, and time.      Cranial Nerves: No cranial nerve deficit.      Sensory: No sensory deficit.   Psychiatric:         Behavior: Behavior normal. Behavior is cooperative.         Judgment: Judgment normal.         ?   Vitals:    11/03/20 0844   BP: 138/87   Pulse: 92   Temp: 98.1 °F (36.7 °C)      ?     ECOG SCORE    1 - Restricted in strenuous activity-ambulatory and able to carry out work of a light nature       ?   Laboratory:  ?   Lab Visit on 11/03/2020   Component Date Value Ref Range Status    WBC 11/03/2020 15.80* 3.90 - 12.70 K/uL Final    RBC 11/03/2020 5.23  4.00 - 5.40 M/uL Final    Hemoglobin 11/03/2020 12.3  12.0 - 16.0 g/dL Final    Hematocrit 11/03/2020 40.1  37.0 - 48.5 % Final    MCV 11/03/2020 77* 82 - 98 fL Final    MCH 11/03/2020 23.5* 27.0 - 31.0 pg Final    MCHC 11/03/2020 30.7* 32.0 - 36.0 g/dL Final    RDW 11/03/2020 16.1* 11.5 - 14.5 % Final    Platelets 11/03/2020 320  150 - 350 K/uL Final    MPV 11/03/2020 9.2  9.2 - 12.9 fL Final    Immature Granulocytes 11/03/2020 0.6* 0.0 - 0.5 % Final    Gran # (ANC) 11/03/2020 12.0* 1.8 - 7.7 K/uL Final    Immature Grans (Abs) 11/03/2020 0.10* 0.00 - 0.04 K/uL Final    Lymph # 11/03/2020 2.7  1.0 - 4.8 K/uL Final    Mono # 11/03/2020 0.8  0.3 - 1.0 K/uL Final    Eos # 11/03/2020 0.1  0.0 - 0.5 K/uL Final    Baso # 11/03/2020 0.07  0.00 - 0.20 K/uL Final    nRBC 11/03/2020 0  0 /100 WBC Final    Gran % 11/03/2020 75.8* 38.0 - 73.0 % Final    Lymph % 11/03/2020 17.3* 18.0 - 48.0 % Final    Mono % 11/03/2020 5.3  4.0 - 15.0  % Final    Eosinophil % 11/03/2020 0.6  0.0 - 8.0 % Final    Basophil % 11/03/2020 0.4  0.0 - 1.9 % Final    Differential Method 11/03/2020 Automated   Final      ?   Tumor markers   ?   ?   Imaging: FL Fluoro for Pain Management  See OP Notes for results.     IMPRESSION: See OP Notes for results.     This procedure was auto-finalized by: Virtual Radiologist     ?      Pathology:  Pathology Results  (Last 10 years)               02/18/20 0828  Specimen to Pathology, Surgery General Surgery Final result    Narrative:  Pre-op Diagnosis: Grade IV hemorrhoids [K64.3]   Rectal bleed [K62.5]   Procedure(s):   HEMORRHOIDECTOMY   BLOCK, NERVE, PUDENDAL   Number of specimens: 1   Name of specimens:   1. Right posterior hemorrhoid lesion - PERM   Specimen total (fresh, frozen, permanent):->1              ?   Assessment/Plan:       1. Anemia, unspecified type    2. Essential hypertension    3. Unintentional weight loss of 10% body weight within 6 months    4. BRANNON (obstructive sleep apnea)          ?BRANNON (obstructive sleep apnea)  Undergoing sleep study.  Follows with pulmonology service.    Unintentional weight loss:  Resolved.  Currently gaining weight.  Prior studies including PET-CT scan reviewed no evidence of lymphoma.  No further workup necessary at this time.    Leukocytosis with left shift:  Likely due to recent upper respiratory infection.  Denies fever or chills.    Colon screening:  Up-to-date.  Next due in 2022.    ?Anemia, unspecified type  -     CBC Auto Differential; Future; Expected date: 11/03/2020  -     Comprehensive Metabolic Panel; Future; Expected date: 11/03/2020  -     CBC Auto Differential; Future; Expected date: 11/03/2020  -     Comprehensive Metabolic Panel; Future; Expected date: 11/03/2020    Essential hypertension  -     CBC Auto Differential; Future; Expected date: 11/03/2020  -     Comprehensive Metabolic Panel; Future; Expected date: 11/03/2020  -     CBC Auto Differential; Future; Expected  date: 11/03/2020  -     Comprehensive Metabolic Panel; Future; Expected date: 11/03/2020    Unintentional weight loss of 10% body weight within 6 months  -     CBC Auto Differential; Future; Expected date: 11/03/2020  -     Comprehensive Metabolic Panel; Future; Expected date: 11/03/2020  -     CBC Auto Differential; Future; Expected date: 11/03/2020  -     Comprehensive Metabolic Panel; Future; Expected date: 11/03/2020    BRANNON (obstructive sleep apnea)      ?   Follow-Up: Follow up in about 6 months (around 5/3/2021).    ALEX GUADALUPE Md., Ph.D  Hematology & Oncology Department  Phone #: [phone]

## 2020-11-05 RX ORDER — PANTOPRAZOLE SODIUM 40 MG/1
40 TABLET, DELAYED RELEASE ORAL EVERY MORNING
Qty: 90 TABLET | Refills: 4 | Status: SHIPPED | OUTPATIENT
Start: 2020-11-05 | End: 2021-01-27

## 2020-11-06 NOTE — PROCEDURES
"Mild Obstructive Sleep apnea(BRANNON): overall AHI was 8.8/hr . 53 events.  EKG showed no cardiac abnormalities.  EEG did not show alpha intrusion.  The patient snored with soft snoring volume.  Significant periodic leg movements(PLMs) during sleep. However, mild significant associated arousals.  No Significant Central Sleep Apnea (CSA)  Reduced sleep efficiency, long primary sleep latency, long REM sleep latency and short slow wave latency.  Reduced testing in Supine position may underestimate BRANNON severity  Obstructive Sleep Apnea (G47.33)  Circardian rhythm disorder  Insomnia related to Sleep initiation/sleep maintaince/terminal  Therapeutic CPAP titration to determine optimal pressure required to alleviate sleep disordered breathing    See imported Sleep Study result in "Chart Review" under the   "Media tab".      (This Sleep Study was interpreted by a Board Certified Sleep   Specialist who conducted an epoch-by-epoch review of the entire   raw data recording.)     (The indication for this sleep study was reviewed and deemed   appropriate by [facility] Practice Parameters or other reasons by a   Board Certified Sleep Specialist.)    Chandra Toney MD      "

## 2020-11-11 ENCOUNTER — PATIENT MESSAGE (OUTPATIENT)
Dept: INTERNAL MEDICINE | Facility: CLINIC | Age: 55
End: 2020-11-11

## 2020-11-11 ENCOUNTER — PATIENT MESSAGE (OUTPATIENT)
Dept: SLEEP MEDICINE | Facility: CLINIC | Age: 55
End: 2020-11-11

## 2020-11-12 ENCOUNTER — TELEPHONE (OUTPATIENT)
Dept: INTERNAL MEDICINE | Facility: CLINIC | Age: 55
End: 2020-11-12

## 2020-11-12 ENCOUNTER — TELEPHONE (OUTPATIENT)
Dept: PAIN MEDICINE | Facility: CLINIC | Age: 55
End: 2020-11-12

## 2020-11-12 ENCOUNTER — PATIENT MESSAGE (OUTPATIENT)
Dept: PAIN MEDICINE | Facility: CLINIC | Age: 55
End: 2020-11-12

## 2020-11-12 NOTE — TELEPHONE ENCOUNTER
----- Message from Laly Jain sent at 11/12/2020 12:35 PM CST -----  Contact: SELF [phone]  Patient would like to speak with you about rescheduling her appointment Please advise

## 2020-11-12 NOTE — TELEPHONE ENCOUNTER
Spoke with patient and she stated that she had her shingles vaccine on Monday or Tuesday of this week and has been nauseated and weak ever since. She said she has just been in the bed and feels terrible and wanted to know if that is normal or could it be something else. I informed her per Dr. Munoz that it is normal to sometimes feel bad after the vaccine, however she shouldn't still be feeling bad and that she may want to either go to urgent care today to rule out other things such as Covid-19 or flu, or she could see if she feels better by tomorrow. She verbalized understanding and said she will see if she still feels bad tomorrow and if so will go to urgent care.

## 2020-11-18 ENCOUNTER — OFFICE VISIT (OUTPATIENT)
Dept: SLEEP MEDICINE | Facility: CLINIC | Age: 55
End: 2020-11-18
Payer: MEDICAID

## 2020-11-18 VITALS
WEIGHT: 203.5 LBS | HEART RATE: 90 BPM | RESPIRATION RATE: 18 BRPM | BODY MASS INDEX: 33.91 KG/M2 | DIASTOLIC BLOOD PRESSURE: 76 MMHG | OXYGEN SATURATION: 97 % | HEIGHT: 65 IN | SYSTOLIC BLOOD PRESSURE: 130 MMHG

## 2020-11-18 DIAGNOSIS — Z72.821 POOR SLEEP HYGIENE: ICD-10-CM

## 2020-11-18 DIAGNOSIS — G47.33 OSA (OBSTRUCTIVE SLEEP APNEA): Primary | ICD-10-CM

## 2020-11-18 DIAGNOSIS — E66.9 OBESITY, CLASS I, BMI 30-34.9: ICD-10-CM

## 2020-11-18 PROCEDURE — 99214 PR OFFICE/OUTPT VISIT, EST, LEVL IV, 30-39 MIN: ICD-10-PCS | Mod: S$PBB,,, | Performed by: NURSE PRACTITIONER

## 2020-11-18 PROCEDURE — 99214 OFFICE O/P EST MOD 30 MIN: CPT | Mod: PBBFAC | Performed by: NURSE PRACTITIONER

## 2020-11-18 PROCEDURE — 99999 PR PBB SHADOW E&M-EST. PATIENT-LVL IV: ICD-10-PCS | Mod: PBBFAC,,, | Performed by: NURSE PRACTITIONER

## 2020-11-18 PROCEDURE — 99999 PR PBB SHADOW E&M-EST. PATIENT-LVL IV: CPT | Mod: PBBFAC,,, | Performed by: NURSE PRACTITIONER

## 2020-11-18 PROCEDURE — 99214 OFFICE O/P EST MOD 30 MIN: CPT | Mod: S$PBB,,, | Performed by: NURSE PRACTITIONER

## 2020-11-18 NOTE — PATIENT INSTRUCTIONS
Continuous Positive Air Pressure (CPAP)     A mask over the nose gently directs air into the throat to keep the airway open.   Continuous positive air pressure (CPAP) uses gentle air pressure to hold the airway open. CPAP is often the most effective treatment for sleep apnea and severe snoring. It works very well for many people. But keep in mind that it can take several adjustments before the setup is right for you.  How CPAP works  The CPAP machine  is a small portable pump beside the bed. The pump sends air through a hose, which is held over your nose and mouth by a mask. Mild air pressure is gently pushed through your airway. The air pressure nudges sagging tissues aside. This widens the airway so you can breathe better. CPAP may be combined with other kinds of therapy for sleep apnea.  Types of air pressure treatments  There are different types of CPAP. Your doctor or CPAP technician will help you decide which type is best for you:  · Basic CPAP keeps the pressure constant all night long.  · A bilevel device (BiPAP) provides more pressure when you breathe in and less when you breathe out. A BiPAP machine also may be set to provide automatic breaths to maintain breathing if you stop breathing while sleeping.  · An autoCPAP device automatically adjusts pressure throughout the night and in response to changes such as body position, sleep stage, and snoring.  Date Last Reviewed: 8/10/2015  © 8612-4190 [employer]. [address] [place] [address], [place] [address]. All rights reserved. This information is not intended as a substitute for professional medical care. Always follow your healthcare professional's instructions.

## 2020-11-18 NOTE — PROGRESS NOTES
Subjective:      Patient ID: Marcie Bazan is a 55 y.o. female.    Chief Complaint: Sleep Apnea    HPI  Patient presents to the office today for evaluation of sleep apnea.  Patient with snoring and witnessed apneas. Patient not having problems falling asleep, but wakes up frequently throughout the night. She keeps TV on all night, changing channels in the middle of the night to watch a program. She also pays on phone for a bit to fall back asleep during the night. She has been working on sleep hygiene. No improvement in awakenings.  Patient does not wake up feeling refreshed in the morning.  Patient with daytime hypersomnolence.  [place] Sleepiness Scale score 16.  Patient has had symptoms for years. Comorbidities include HTN  Bedtime: 8-10PM  Wake time: 5-6AM  More recently going back to bed from 8-noon.  STOP bang 5. HST ordered per Dr. Roberto. She states she did not sleep well. HST was nondiagnostic. Polysomnogram was perfromed.     Patient Active Problem List   Diagnosis    Allergic rhinitis    Anemia of chronic disease    Erosive esophagitis    Essential hypertension    History of abnormal cervical Pap smear    History of Helicobacter pylori infection    Lipoma    Multinodular thyroid    Hypomagnesemia    Thrombocytopenia    Liver replaced by transplant    Immunosuppression    Adrenal cortical steroids causing adverse effect in therapeutic use    At risk for opportunistic infections    Long-term use of immunosuppressant medication    Accelerated liver transplant rejection    Vitamin D deficiency    Mixed hyperlipidemia    Adhesive capsulitis of both shoulders    Piriformis syndrome of both sides    Spondylosis of lumbar region without myelopathy or radiculopathy    Class 1 obesity with comorbidity and body mass index (BMI) of 33.0 to 33.9 in adult    Sacroiliac joint dysfunction    Acute stress reaction    Hypersomnia    BRANNON (obstructive sleep apnea)    Gastroesophageal reflux disease  "with esophagitis without hemorrhage       /76   Pulse 90   Resp 18   Ht 5' 5" (1.651 m)   Wt 92.3 kg (203 lb 7.8 oz)   LMP 01/01/1985 (Approximate) Comment: 1985  SpO2 97%   BMI 33.86 kg/m²   Body mass index is 33.86 kg/m².    Review of Systems   Constitutional: Positive for fatigue.   Respiratory: Positive for snoring and dyspnea on extertion.    Musculoskeletal: Positive for arthralgias.   Psychiatric/Behavioral: Positive for sleep disturbance.   All other systems reviewed and are negative.    Objective:      Physical Exam  Constitutional:       Appearance: She is well-developed.   HENT:      Head: Normocephalic and atraumatic.      Mouth/Throat:      Comments: Wearing mask due to covid concerns  Neck:      Musculoskeletal: Normal range of motion and neck supple.   Cardiovascular:      Rate and Rhythm: Normal rate and regular rhythm.      Heart sounds: No murmur. No gallop.    Pulmonary:      Effort: Pulmonary effort is normal.      Breath sounds: Normal breath sounds.   Abdominal:      Palpations: Abdomen is soft. There is no mass.      Tenderness: There is no abdominal tenderness.   Musculoskeletal: Normal range of motion.   Skin:     General: Skin is warm and dry.   Neurological:      Mental Status: She is alert and oriented to person, place, and time.   Psychiatric:         Mood and Affect: Mood normal.         Behavior: Behavior normal.       Personal Diagnostic Review  Mild Obstructive Sleep apnea(BRANNON): overall AHI was 8.8/hr . 53 events.    Assessment:       1. BRANNON (obstructive sleep apnea)    2. Poor sleep hygiene    3. Obesity, Class I, BMI 30-34.9        Outpatient Encounter Medications as of 11/18/2020   Medication Sig Dispense Refill    atorvastatin (LIPITOR) 40 MG tablet Take 1 tablet (40 mg total) by mouth every evening. 90 tablet 4    BIOTIN ORAL Take by mouth every evening.       cyclobenzaprine (FLEXERIL) 10 MG tablet Take 1/2 to 1 tab TID PRN muscle spasms. May cause drowsiness. " 90 tablet 0    ergocalciferol (ERGOCALCIFEROL) 50,000 unit Cap Take 1 capsule (50,000 Units total) by mouth every 7 days. 12 capsule 4    famotidine (PEPCID) 20 MG tablet Take 1 tablet (20 mg total) by mouth 2 (two) times a day. 180 tablet 4    furosemide (LASIX) 40 MG tablet TAKE 1 TABLET BY MOUTH DAILY 30 tablet 2    gabapentin (NEURONTIN) 300 MG capsule Take 3 capsules (900 mg total) by mouth 2 (two) times daily. 120 capsule 3    gabapentin (NEURONTIN) 600 MG tablet TAKE 1 TABLET(600 MG) BY MOUTH THREE TIMES DAILY WITH 300 MG 90 tablet 1    levocetirizine (XYZAL) 5 MG tablet TAKE 1 TABLET(5 MG) BY MOUTH EVERY EVENING 30 tablet 0    magnesium oxide (MAG-OX) 400 mg (241.3 mg magnesium) tablet TAKE 2 TABLETS BY MOUTH TWICE DAILY. 270 tablet 2    NIFEdipine (PROCARDIA-XL) 30 MG (OSM) 24 hr tablet Take 1 tablet (30 mg total) by mouth every evening. 90 tablet 4    pantoprazole (PROTONIX) 40 MG tablet Take 1 tablet (40 mg total) by mouth every morning. 90 tablet 4    potassium chloride (KLOR-CON) 10 MEQ TbSR Take 2 tablets (20 mEq total) by mouth once daily. 60 tablet 2    tacrolimus (PROGRAF) 1 MG Cap Take 2 capsules (2 mg total) by mouth every morning AND 1 capsule (1 mg total) every evening. 90 capsule 11    varicella-zoster gE-AS01B, PF, (SHINGRIX) 50 mcg/0.5 mL injection Inject 0.5 mL (one dose) into muscle now; give second dose at least 2 months later 1 each 1    senna-docusate 8.6-50 mg (PERICOLACE) 8.6-50 mg per tablet Take 1 tablet by mouth 2 (two) times daily. Take while taking narcotic pain medications (Patient not taking: Reported on 11/18/2020) 28 tablet 0    [DISCONTINUED] ergocalciferol (ERGOCALCIFEROL) 50,000 unit Cap TAKE 1 CAPSULE BY MOUTH EVERY 7 DAYS 12 capsule 0     No facility-administered encounter medications on file as of 11/18/2020.      Orders Placed This Encounter   Procedures    CPAP FOR HOME USE     Order Specific Question:   Length of need (1-99 months):     Answer:   99      Order Specific Question:   Type ():     Answer:   Auto CPAP     Order Specific Question:   Auto CPAP pressure setting range (cmH20):     Answer:   6-20     Order Specific Question:   Humidification (/):     Answer:   Heated     Order Specific Question:   Choose ONE mask type and its corresponding cushions and/or pillows:     Answer:    Nasal Cushion Mask, 1 per 90 days:  Nasal Cushions, (6 per 90 days)     Order Specific Question:   Choose EITHER Heated or Non-Heated Tubjing     Answer:    Non-Heated Tubing, 1 per 90 days     Order Specific Question:   All other supplies as needed as listed below:     Answer:    Headgear, 1 per 180 days     Order Specific Question:   All other supplies as needed as listed below:     Answer:    Chin Strap, 1 per 180 days     Order Specific Question:   All other supplies as needed as listed below:     Answer:    Disposable Filter, 6 per 90 days     Order Specific Question:   All other supplies as needed as listed below:     Answer:    Non-Disposable Filter, 1 per 180 days     Order Specific Question:   All other supplies as needed as listed below:     Answer:    Humidifier Chamber, 1 per 180 days     Plan:        Problem List Items Addressed This Visit        Other    BRANNON (obstructive sleep apnea) - Primary    Relevant Orders    CPAP FOR HOME USE      Other Visit Diagnoses     Poor sleep hygiene        Obesity, Class I, BMI 30-34.9          continue to work on sleep hygiene.     Follow up 10 weeks

## 2020-11-19 ENCOUNTER — OFFICE VISIT (OUTPATIENT)
Dept: PAIN MEDICINE | Facility: CLINIC | Age: 55
End: 2020-11-19
Payer: MEDICAID

## 2020-11-19 VITALS
SYSTOLIC BLOOD PRESSURE: 136 MMHG | RESPIRATION RATE: 18 BRPM | HEART RATE: 87 BPM | BODY MASS INDEX: 33.82 KG/M2 | WEIGHT: 203 LBS | HEIGHT: 65 IN | DIASTOLIC BLOOD PRESSURE: 89 MMHG

## 2020-11-19 DIAGNOSIS — M70.61 GREATER TROCHANTERIC BURSITIS OF BOTH HIPS: Primary | ICD-10-CM

## 2020-11-19 DIAGNOSIS — M25.571 CHRONIC PAIN OF RIGHT ANKLE: ICD-10-CM

## 2020-11-19 DIAGNOSIS — G57.01 PIRIFORMIS SYNDROME, RIGHT: ICD-10-CM

## 2020-11-19 DIAGNOSIS — M46.1 SACROILIITIS: ICD-10-CM

## 2020-11-19 DIAGNOSIS — M70.62 GREATER TROCHANTERIC BURSITIS OF BOTH HIPS: Primary | ICD-10-CM

## 2020-11-19 DIAGNOSIS — G89.29 CHRONIC PAIN OF RIGHT ANKLE: ICD-10-CM

## 2020-11-19 PROCEDURE — 99214 PR OFFICE/OUTPT VISIT, EST, LEVL IV, 30-39 MIN: ICD-10-PCS | Mod: S$PBB,,, | Performed by: PHYSICIAN ASSISTANT

## 2020-11-19 PROCEDURE — 99999 PR PBB SHADOW E&M-EST. PATIENT-LVL IV: ICD-10-PCS | Mod: PBBFAC,,, | Performed by: PHYSICIAN ASSISTANT

## 2020-11-19 PROCEDURE — 99999 PR PBB SHADOW E&M-EST. PATIENT-LVL IV: CPT | Mod: PBBFAC,,, | Performed by: PHYSICIAN ASSISTANT

## 2020-11-19 PROCEDURE — 99214 OFFICE O/P EST MOD 30 MIN: CPT | Mod: PBBFAC | Performed by: PHYSICIAN ASSISTANT

## 2020-11-19 PROCEDURE — 99214 OFFICE O/P EST MOD 30 MIN: CPT | Mod: S$PBB,,, | Performed by: PHYSICIAN ASSISTANT
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
## 2020-11-30 ENCOUNTER — PATIENT MESSAGE (OUTPATIENT)
Dept: INTERNAL MEDICINE | Facility: CLINIC | Age: 55
End: 2020-11-30

## 2020-12-03 ENCOUNTER — PATIENT MESSAGE (OUTPATIENT)
Dept: PAIN MEDICINE | Facility: CLINIC | Age: 55
End: 2020-12-03

## 2020-12-03 DIAGNOSIS — M25.612 DECREASED SHOULDER MOBILITY, LEFT: ICD-10-CM

## 2020-12-03 DIAGNOSIS — G62.9 NEUROPATHY: ICD-10-CM

## 2020-12-03 NOTE — TELEPHONE ENCOUNTER
Last refill:05/15/2020    Last visit:11/19/2020  Medication:gabapentin 300 BID  Pharmacy :[facility] #[id number] - [address] AT [facility] [address]

## 2020-12-03 NOTE — TELEPHONE ENCOUNTER
Last refill: 10/08/2020  Last visit:11/19/2020  Medication: flexeril 10mg   Pharmacy :[facility] #[id number] - [address] AT [facility]

## 2020-12-06 RX ORDER — GABAPENTIN 600 MG/1
TABLET ORAL
Qty: 90 TABLET | Refills: 1 | Status: SHIPPED | OUTPATIENT
Start: 2020-12-06 | End: 2021-02-01 | Stop reason: SDUPTHER

## 2020-12-06 RX ORDER — CYCLOBENZAPRINE HCL 10 MG
TABLET ORAL
Qty: 90 TABLET | Refills: 0 | Status: SHIPPED | OUTPATIENT
Start: 2020-12-06 | End: 2021-02-01 | Stop reason: SDUPTHER

## 2020-12-06 RX ORDER — GABAPENTIN 300 MG/1
300 CAPSULE ORAL 3 TIMES DAILY
Qty: 120 CAPSULE | Refills: 1 | Status: SHIPPED | OUTPATIENT
Start: 2020-12-06 | End: 2021-02-01 | Stop reason: SDUPTHER

## 2020-12-11 ENCOUNTER — TELEPHONE (OUTPATIENT)
Dept: PAIN MEDICINE | Facility: CLINIC | Age: 55
End: 2020-12-11

## 2020-12-11 ENCOUNTER — PATIENT MESSAGE (OUTPATIENT)
Dept: OTHER | Facility: OTHER | Age: 55
End: 2020-12-11

## 2020-12-11 NOTE — TELEPHONE ENCOUNTER
Spoke with patient to let her know that Gabapentin 300mg, 600mg, and Flexeril 10mg was sent in to [facility] [address] for her on 12/06/2020. Patient thanked me and verbalized understanding.

## 2020-12-11 NOTE — TELEPHONE ENCOUNTER
----- Message from Shreya Aneesh sent at 12/11/2020  9:51 AM CST -----  Regarding: refill  Contact: pt  1. What is the name of the medication you are requesting? Pain   2. What is the dose? N/a  3. How do you take the medication? Orally, topically, etc? N/a  4. How often do you take this medication? N/a  5. Do you need a 30 day or 90 day supply? N/a  6. How many refills are you requesting? N/a  7. What is your preferred pharmacy and location of the pharmacy? [facility] [address]  8. Who can we contact with further questions? The pt at [phone]

## 2020-12-14 ENCOUNTER — LAB VISIT (OUTPATIENT)
Dept: LAB | Facility: HOSPITAL | Age: 55
End: 2020-12-14
Attending: INTERNAL MEDICINE
Payer: MEDICAID

## 2020-12-14 DIAGNOSIS — Z94.4 LIVER REPLACED BY TRANSPLANT: ICD-10-CM

## 2020-12-14 LAB
ALBUMIN SERPL BCP-MCNC: 3.6 G/DL (ref 3.5–5.2)
ALP SERPL-CCNC: 173 U/L (ref 55–135)
ALT SERPL W/O P-5'-P-CCNC: 20 U/L (ref 10–44)
ANION GAP SERPL CALC-SCNC: 12 MMOL/L (ref 8–16)
AST SERPL-CCNC: 19 U/L (ref 10–40)
BASOPHILS # BLD AUTO: 0.04 K/UL (ref 0–0.2)
BASOPHILS NFR BLD: 0.4 % (ref 0–1.9)
BILIRUB SERPL-MCNC: 0.4 MG/DL (ref 0.1–1)
BUN SERPL-MCNC: 11 MG/DL (ref 6–20)
CALCIUM SERPL-MCNC: 9.2 MG/DL (ref 8.7–10.5)
CHLORIDE SERPL-SCNC: 106 MMOL/L (ref 95–110)
CO2 SERPL-SCNC: 25 MMOL/L (ref 23–29)
CREAT SERPL-MCNC: 0.9 MG/DL (ref 0.5–1.4)
DIFFERENTIAL METHOD: ABNORMAL
EOSINOPHIL # BLD AUTO: 0.1 K/UL (ref 0–0.5)
EOSINOPHIL NFR BLD: 1.1 % (ref 0–8)
ERYTHROCYTE [DISTWIDTH] IN BLOOD BY AUTOMATED COUNT: 16.9 % (ref 11.5–14.5)
EST. GFR  (AFRICAN AMERICAN): >60 ML/MIN/1.73 M^2
EST. GFR  (NON AFRICAN AMERICAN): >60 ML/MIN/1.73 M^2
GLUCOSE SERPL-MCNC: 94 MG/DL (ref 70–110)
HCT VFR BLD AUTO: 38.6 % (ref 37–48.5)
HGB BLD-MCNC: 11.4 G/DL (ref 12–16)
IMM GRANULOCYTES # BLD AUTO: 0.04 K/UL (ref 0–0.04)
IMM GRANULOCYTES NFR BLD AUTO: 0.4 % (ref 0–0.5)
LYMPHOCYTES # BLD AUTO: 2.3 K/UL (ref 1–4.8)
LYMPHOCYTES NFR BLD: 22.5 % (ref 18–48)
MAGNESIUM SERPL-MCNC: 1.6 MG/DL (ref 1.6–2.6)
MCH RBC QN AUTO: 23.3 PG (ref 27–31)
MCHC RBC AUTO-ENTMCNC: 29.5 G/DL (ref 32–36)
MCV RBC AUTO: 79 FL (ref 82–98)
MONOCYTES # BLD AUTO: 0.6 K/UL (ref 0.3–1)
MONOCYTES NFR BLD: 5.5 % (ref 4–15)
NEUTROPHILS # BLD AUTO: 7.2 K/UL (ref 1.8–7.7)
NEUTROPHILS NFR BLD: 70.1 % (ref 38–73)
NRBC BLD-RTO: 0 /100 WBC
PLATELET # BLD AUTO: 87 K/UL (ref 150–350)
PMV BLD AUTO: 11.7 FL (ref 9.2–12.9)
POTASSIUM SERPL-SCNC: 4.1 MMOL/L (ref 3.5–5.1)
PROT SERPL-MCNC: 7.5 G/DL (ref 6–8.4)
RBC # BLD AUTO: 4.9 M/UL (ref 4–5.4)
SODIUM SERPL-SCNC: 143 MMOL/L (ref 136–145)
WBC # BLD AUTO: 10.23 K/UL (ref 3.9–12.7)

## 2020-12-14 PROCEDURE — 80053 COMPREHEN METABOLIC PANEL: CPT

## 2020-12-14 PROCEDURE — 85025 COMPLETE CBC W/AUTO DIFF WBC: CPT

## 2020-12-14 PROCEDURE — 36415 COLL VENOUS BLD VENIPUNCTURE: CPT

## 2020-12-14 PROCEDURE — 83735 ASSAY OF MAGNESIUM: CPT

## 2020-12-14 PROCEDURE — 80197 ASSAY OF TACROLIMUS: CPT

## 2020-12-15 LAB — TACROLIMUS BLD-MCNC: 5.9 NG/ML (ref 5–15)

## 2020-12-17 ENCOUNTER — TELEPHONE (OUTPATIENT)
Dept: TRANSPLANT | Facility: CLINIC | Age: 55
End: 2020-12-17

## 2020-12-17 NOTE — TELEPHONE ENCOUNTER
Sent patient message via portal to let her know labs are stable.  No medication changes, next labs due on 01/18/21      ----- Message from Joselin Souza MD sent at 12/17/2020  3:06 PM CST -----  Labs improved, repeat in 4 weeks

## 2020-12-21 NOTE — ASSESSMENT & PLAN NOTE
- weekly cmv pcr,  No valcyte due to leukopenia  - pentamidine for pcp       CALLED PATIENT TO ADVISE OF STUDY RESULTS. PATIENT VERBAILZED UNDERSTANDING AND WAS AGREEABLE TO PAP THERAPY. FAXED ORDER TO DME

## 2020-12-22 ENCOUNTER — OFFICE VISIT (OUTPATIENT)
Dept: ENDOCRINOLOGY | Facility: CLINIC | Age: 55
End: 2020-12-22
Payer: MEDICAID

## 2020-12-22 ENCOUNTER — HOSPITAL ENCOUNTER (OUTPATIENT)
Dept: RADIOLOGY | Facility: HOSPITAL | Age: 55
Discharge: HOME | End: 2020-12-22
Attending: INTERNAL MEDICINE
Payer: MEDICAID

## 2020-12-22 VITALS
DIASTOLIC BLOOD PRESSURE: 82 MMHG | SYSTOLIC BLOOD PRESSURE: 136 MMHG | BODY MASS INDEX: 34.08 KG/M2 | WEIGHT: 204.81 LBS | HEART RATE: 92 BPM

## 2020-12-22 DIAGNOSIS — E04.2 MULTIPLE THYROID NODULES: Primary | ICD-10-CM

## 2020-12-22 DIAGNOSIS — E04.2 MULTIPLE THYROID NODULES: ICD-10-CM

## 2020-12-22 PROCEDURE — 99214 PR OFFICE/OUTPT VISIT, EST, LEVL IV, 30-39 MIN: ICD-10-PCS | Mod: S$PBB,,, | Performed by: INTERNAL MEDICINE

## 2020-12-22 PROCEDURE — 76536 US SOFT TISSUE HEAD NECK THYROID: ICD-10-PCS | Mod: 26,,, | Performed by: RADIOLOGY

## 2020-12-22 PROCEDURE — 99214 OFFICE O/P EST MOD 30 MIN: CPT | Mod: PBBFAC,25 | Performed by: INTERNAL MEDICINE

## 2020-12-22 PROCEDURE — 99999 PR PBB SHADOW E&M-EST. PATIENT-LVL IV: ICD-10-PCS | Mod: PBBFAC,,, | Performed by: INTERNAL MEDICINE

## 2020-12-22 PROCEDURE — 99214 OFFICE O/P EST MOD 30 MIN: CPT | Mod: S$PBB,,, | Performed by: INTERNAL MEDICINE

## 2020-12-22 PROCEDURE — 76536 US EXAM OF HEAD AND NECK: CPT | Mod: TC

## 2020-12-22 PROCEDURE — 76536 US EXAM OF HEAD AND NECK: CPT | Mod: 26,,, | Performed by: RADIOLOGY

## 2020-12-22 PROCEDURE — 99999 PR PBB SHADOW E&M-EST. PATIENT-LVL IV: CPT | Mod: PBBFAC,,, | Performed by: INTERNAL MEDICINE

## 2020-12-22 NOTE — PROGRESS NOTES
Referring Provider:  Jenny Suggs*    PCP:  ETHAN Munoz MD    Reason for referral:   Multinodular goiter    CC:  Follow-up on the thyroid    HPI:  Marcie Bazan 55 y.o. female  Patient was found to have multinodular thyroid by PET scan.  She has no history of thyroid disorder.  She is not on any medication for any thyroid disorder.  No family history of thyroid problem.    She had liver transplanted in 2017 after diagnosis of liver cancer.  No complaints of chest pain, vomiting, edema, or rash    Past Medical History:   Diagnosis Date    Alcohol abuse     Alcoholic hepatitis     Anemia of chronic disease     Arthritis     generialized    Cancer 12/26/2017    liver    Coagulopathy     Dyspnea on exertion 3/26/2020    Encounter for blood transfusion     End stage liver disease     History of alcohol abuse     Hyperlipidemia     Hypersomnia 9/11/2020    Hypertension     Hyponatremia     Liver cirrhosis     Liver transplant candidate 12/26/2017    Obesity (BMI 30-39.9) 1/5/2016       Past Surgical History:   Procedure Laterality Date    BREAST BIOPSY Left     benign    CHOLECYSTECTOMY      COLONOSCOPY N/A 12/1/2017    Procedure: COLONOSCOPY;  Surgeon: Filemon Fuentes MD;  Location: [facility] ([address] [place] [address]);  Service: Endoscopy;  Laterality: N/A;    COLONOSCOPY N/A 12/5/2017    Procedure: COLONOSCOPY;  Surgeon: José Chavira MD;  Location: [facility] ([address] [place] [address]);  Service: Endoscopy;  Laterality: N/A;    EXAMINATION UNDER ANESTHESIA N/A 2/18/2020    Procedure: EXAM UNDER ANESTHESIA;  Surgeon: Alden Horowitz MD;  Location: [facility] OR;  Service: General;  Laterality: N/A;    EXCISIONAL HEMORRHOIDECTOMY N/A 2/18/2020    Procedure: HEMORRHOIDECTOMY;  Surgeon: Alden Horowitz MD;  Location: [facility] OR;  Service: General;  Laterality: N/A;    HYSTERECTOMY      complete     INJECTION OF ANESTHETIC AGENT AROUND PUDENDAL NERVE N/A 2/18/2020    Procedure: BLOCK, NERVE, PUDENDAL;  Surgeon:  Alden Horowitz MD;  Location: [facility] OR;  Service: General;  Laterality: N/A;    INJECTION OF ANESTHETIC AGENT INTO SACROILIAC JOINT Right 6/14/2019    Procedure: right Sacroiliac Joint Injection;  Surgeon: David Desai MD;  Location: [facility];  Service: Pain Management;  Laterality: Right;    INJECTION OF ANESTHETIC AGENT INTO SACROILIAC JOINT Bilateral 2/7/2020    Procedure: Bilateral GT bursa + Bilateral Sacroiliac Joint Injection;  Surgeon: David Desai MD;  Location: [facility];  Service: Pain Management;  Laterality: Bilateral;    INJECTION OF ANESTHETIC AGENT INTO SACROILIAC JOINT Bilateral 7/7/2020    Procedure: Bilateral GT bursa +SIJ injection;  Surgeon: David Desai MD;  Location: [facility];  Service: Pain Management;  Laterality: Bilateral;    INJECTION OF ANESTHETIC AGENT INTO SACROILIAC JOINT Right 10/21/2020    Procedure: Right piriformis + right SIJ + right GT bursa injection;  Surgeon: David Desai MD;  Location: [facility];  Service: Pain Management;  Laterality: Right;    INJECTION OF JOINT Bilateral 2/7/2020    Procedure: Bilateral GT bursa + Bilateral Sacroiliac Joint Injection;  Surgeon: David Desai MD;  Location: [facility];  Service: Pain Management;  Laterality: Bilateral;    INJECTION OF JOINT Bilateral 7/7/2020    Procedure: Bilateral GT bursa +SIJ injection;  Surgeon: David Desai MD;  Location: [facility];  Service: Pain Management;  Laterality: Bilateral;    INJECTION OF JOINT Right 10/21/2020    Procedure: Right piriformis + right SIJ + right GT bursa injection;  Surgeon: David Desai MD;  Location: [facility];  Service: Pain Management;  Laterality: Right;    INJECTION OF PIRIFORMIS MUSCLE Right 6/14/2019    Procedure: Right piriformis injection;  Surgeon: David Desai MD;  Location: [facility];  Service: Pain Management;  Laterality: Right;    INJECTION OF PIRIFORMIS MUSCLE Right 10/21/2020    Procedure: Right  piriformis + right SIJ + right GT bursa injection;  Surgeon: David Desai MD;  Location: [facility];  Service: Pain Management;  Laterality: Right;    LIVER TRANSPLANT  12/2017       Social History     Socioeconomic History    Marital status: Single     Spouse name: Not on file    Number of children: 2    Years of education: Not on file    Highest education level: Not on file   Occupational History    Not on file   Social Needs    Financial resource strain: Not on file    Food insecurity     Worry: Not on file     Inability: Not on file    Transportation needs     Medical: Not on file     Non-medical: Not on file   Tobacco Use    Smoking status: Never Smoker    Smokeless tobacco: Never Used   Substance and Sexual Activity    Alcohol use: No     Frequency: Never     Comment: Currently admitted to inpatient rehab 7/2017    Drug use: No    Sexual activity: Not Currently   Lifestyle    Physical activity     Days per week: Not on file     Minutes per session: Not on file    Stress: Not on file   Relationships    Social connections     Talks on phone: Not on file     Gets together: Not on file     Attends Mandaeism service: Not on file     Active member of club or organization: Not on file     Attends meetings of clubs or organizations: Not on file     Relationship status: Not on file   Other Topics Concern    Not on file   Social History Narrative    Not on file         ROS:   History of liver cancer  Status post liver transplant  ROS otherwise neg except for what is mentioned in the PMH, PSH and HPI    PE:  Vitals:    12/22/20 0912   BP: 136/82   Pulse: 92     Alert and oriented  No acute distress  No acne  No Proptosis or conjunctivitis  No goitre by inspection  Thyroid gland is not palpable  No cervical lymphadenopathy  Heart reg, no gallop  Lungs cta, no wheezing  No edema in lower legs  Speech normal  Behavior normal  No tremor  + obesity  Body mass index is 34.08 kg/m².      Lab:    Lab  Results   Component Value Date    TSH 2.237 06/08/2020    FREET4 0.74 06/08/2020         Lab Results   Component Value Date    CHOL 129 04/27/2020    TRIG 128 04/27/2020    HDL 34 (L) 04/27/2020    CHOLHDL 26.4 04/27/2020    TOTALCHOLEST 3.8 04/27/2020    NONHDLCHOL 95 04/27/2020     BMP  Lab Results   Component Value Date     12/14/2020    K 4.1 12/14/2020     12/14/2020    CO2 25 12/14/2020    BUN 11 12/14/2020    CREATININE 0.9 12/14/2020    CALCIUM 9.2 12/14/2020    ANIONGAP 12 12/14/2020    ESTGFRAFRICA >60.0 12/14/2020    EGFRNONAA >60.0 12/14/2020     Lab Results   Component Value Date    CREATRANDUR 160.0 10/20/2017     EXAMINATION:  US SOFT TISSUE HEAD NECK THYROID     CLINICAL HISTORY:  Nontoxic multinodular goiter     TECHNIQUE:  Ultrasound of the thyroid and cervical lymph nodes was performed.     COMPARISON:  As above     FINDINGS:  Overall gland volume measures 6.5 cc.  The right lobe measures 3.8 x 1.4 x 1.2 cm.  Left lobe measures 3.6 x 1.3 x 1.3 cm.  There is a colloid cystic nodule measuring 5 mm in the upper right lobe.  There is a solid heterogeneous nodule measuring up to 8 mm in the midportion of the right lobe.  Within the inferior portion of the right lobe there is a 1.6 cm solid nodule which is isoechoic to the gland. Within the isthmus there is also a 1.6 cm nodule with similar characteristics.  Per ACR TI rads criteria, follow-up in 1, 3, and 5 years is suggested for the aforementioned nodules.     Nodules are seen in the left lobe as well.  For example, there is a solid isoechoic nodule with hypoechoic halo in the midportion of the gland that measures up to 1.4 cm.  A mixed cystic and solid nodule seen in the lower portion of the left lobe measuring just under 1 cm.     Impression:     Right lower lobe in isthmus nodules requiring follow-up as above.  No nodule meeting criteria for fine-needle aspiration at this time.         A/P:  Multinodular goiter  Clinically  euthyroid    Orders Placed This Encounter   Procedures    US Soft Tissue Head Neck Thyroid     Standing Status:   Future     Number of Occurrences:   1     Standing Expiration Date:   3/22/2021     Order Specific Question:   May the Radiologist modify the order per protocol to meet the clinical needs of the patient?     Answer:   Yes    TSH     Standing Status:   Future     Number of Occurrences:   1     Standing Expiration Date:   6/22/2021    T4, Free     Standing Status:   Future     Number of Occurrences:   1     Standing Expiration Date:   6/22/2021       Appt in 5 months.      Pt understands the plan and instructions.

## 2021-01-01 NOTE — H&P (VIEW-ONLY)
Chief Complaint: Prematurity, 37w 4d,  spontaneous ileal perforation, status post ostomy and mucous fistula formation and adhesion lysis 8/20, inadequate oral intake, anemia    HPI: Baby Girl Lucy Thomason is an ex Gestational Age: 30w6d week infant now 68-day old CGA: 37w 4d all orally feeding and taking good volumes. Weight gain is fair at 13 g/day. Anemic with hematocrit of 23 and nonresponsive reticulocyte at 5%    Medications: Scheduled Meds:   sodium chloride 4 mEq/mL  4 mEq Oral Q6H    pediatric multivitamin-iron  1 mL Oral Daily     Continuous Infusions:   dextrose 100 mL/kg/day (10/18/21 1556)       Physical Examination:  BP (!) 84/63   Pulse 183   Temp 97.9 °F (36.6 °C)   Resp 64   Ht 41.5 cm   Wt 1950 g   HC 12.32\" (31.3 cm)   SpO2 92%   BMI 11.32 kg/m²   Weight: 1950 g Weight change: -30 g Birth Weight: 25.4 oz (720 g) Birth Head Circumference: 8.66\" (22 cm)       General Appearance:  responsive, active. Skin: good color, jaundice absent, pink, acyanotic. Head:  anterior fontanelle open soft and flat. Splayed sagittal,  sutures  Eyes:  Clear, no drainage. No scleral  Ears:  Well-positioned, no tag/pit  Nose: external nose without deformity, nasal mucosa pink and moist, nasal passages are patent  Mouth: no cleft lip/palate  Neck:  Supple, no deformity, clavicles intact  Chest: mild intercostal retractions.  Good breath sounds bilaterally; tachypnea  heart:  Regular rate & rhythm, no murmur,   Abdomen:  Soft, nontender, full, bowel sounds absent, pink ostomy and mucous fistula noted, both prolapsed about 2 inches  ostomy bag in place  Hips:  Negative Beach and Ortolani  :  Normal female genitalia  Extremities: normal and symmetric movement, normal range of motion, no joint swelling  Neuro:  Appropriate for gestational age  Spine: Normal, no tuft or dimple      Pertinent labs:   CBC with Differential:    Lab Results   Component Value Date    WBC 22.3 2021    RBC 4.32 2021 [facility] -   Gastroenterology  Consult Note    Patient Name: Marcie Bazan  MRN: [id number]  Admission Date: 9/11/2017  Hospital Length of Stay: 0 days  Code Status: Full Code   Attending Provider: Crystal Bee MD   Consulting Provider: Aleyda Acosta PA-C  Primary Care Physician: ODILIA Wall  Principal Problem:Chest pain    Inpatient consult to Gastroenterology  Consult performed by: ALEYDA ACOSTA  Consult ordered by: CRYSTAL BEE  Reason for consult: Elevated bilirubin in patient with cirrhosis        Subjective:     HPI:  The patient presented to the ER for chest pain which relieved with Nitro. ACS was ruled out. We have been consulted for elevated bilirubin. The patient has a history of liver cirrhosis secondary to PERKINS. She reports swollen abdomen. She says this comes and goes. It usually lasts a few days, but this time it hasn't resolved. It is usually associated with nausea and vomiting. She denies a change in bowel habits, hematochezia or melena. A CT scan showed cirrhotic liver with hypodense nodules most likely representing regenerating nodules. She also had moderate ascites and mild nonspecific circumferential small bowel wall thickening. A review of Care Everywhere showed the liver nodules were known and not felt to be HCC. The ascites is new. She says she has never been drained before. Labs show INR up from 1.4 to 2.1. Total bilirubin increased to 11 without significant change in AST or ALT. She denies any change in medications. She denies taking OTC medications or supplements. No recent antibiotic use. UA doesn't look infected. CXR without acute disease. Afebrile with normal WBC count. The patient has chronic microcytic anemia. Currently stable.     Past Medical History:   Diagnosis Date    Hypertension     Liver cirrhosis        Past Surgical History:   Procedure Laterality Date    BREAST SURGERY      CHOLECYSTECTOMY      HYSTERECTOMY         Review of  HGB 2021    HCT 23.1 2021    PLT See Reflexed IPF Result 2021    MCV 2021    MCH 2021    MCHC 2021    RDW 2021    NRBC 1 2021    METASPCT 5 2021    LYMPHOPCT 14 2021    MONOPCT 12 2021    MYELOPCT 1 2021    BASOPCT 0 2021    MONOSABS 2021    LYMPHSABS 2021    EOSABS 2021    BASOSABS 2021    DIFFTYPE NOT REPORTED 2021       Lab Results   Component Value Date    HGB 2021    HCT 23.1 2021     Reticulocyte Count:    Lab Results   Component Value Date    IRF 32.200 2021    RETICPCT 5.4 2021     BMP:    Lab Results   Component Value Date     2021    K 4.7 2021     2021    CO2 22 2021    BUN 2 2021    LABALBU 3.2 2021    CREATININE <0.20 2021    CALCIUM 9.3 2021    GFRAA CANNOT BE CALCULATED 2021    LABGLOM CANNOT BE CALCULATED 2021    GLUCOSE 79 2021       Lab Results   Component Value Date    MG 2021     Lab Results   Component Value Date    PHOS 2021     Lab Results   Component Value Date    TRIG 103 2021         Bilirubin:   Lab Results   Component Value Date    ALKPHOS 426 2021    ALT 23 2021    AST 43 2021    PROT 4.2 2021    BILITOT 0.49 2021    BILIDIR 0.29 2021    IBILI 0.20 2021    LABALBU 3.2 2021       Assessment/Plan:   Patient Active Problem List    Diagnosis Date Noted    Spontaneous intestinal perforation in extreme  infant 2021     Priority: High     Imp: Meconium plug.  spontaneous intestinal perforation noted. placement of penrose drain on . s/p ampicillin and gentamicin ( - ), flagyl (  - ), fluconazole x 1 ().  Drain was being retracted but increased abd distention starting  leading to laparotomy  which showed multiple patient's allergies indicates:   Allergen Reactions    Ferrous sulfate      Patient states the pill makes her sick. She stated she would rather have a shot     Family History     Problem Relation (Age of Onset)    Depression Maternal Grandfather    Diabetes Father, Sister    Hypertension Mother, Father        Social History Main Topics    Smoking status: Never Smoker    Smokeless tobacco: Never Used    Alcohol use Yes      Comment: occasionally    Drug use: No    Sexual activity: Not Currently     Review of Systems   Constitutional: Negative for fever.   HENT: Negative for hearing loss.    Eyes: Negative for visual disturbance.   Respiratory: Positive for shortness of breath. Negative for cough.    Cardiovascular: Positive for chest pain (resolved).   Gastrointestinal:        As per HPI.   Genitourinary: Negative for dysuria, frequency and hematuria.   Musculoskeletal: Negative for arthralgias and back pain.   Skin: Negative for rash.   Neurological: Negative for seizures, syncope, numbness and headaches.   Hematological: Does not bruise/bleed easily.   Psychiatric/Behavioral: The patient is not nervous/anxious.      Objective:     Vital Signs (Most Recent):  Temp: 98.4 °F (36.9 °C) (09/12/17 1200)  Pulse: 102 (09/12/17 1200)  Resp: 20 (09/12/17 1200)  BP: 128/62 (09/12/17 1200)  SpO2: 99 % (09/12/17 1200) Vital Signs (24h Range):  Temp:  [97.6 °F (36.4 °C)-100 °F (37.8 °C)] 98.4 °F (36.9 °C)  Pulse:  [] 102  Resp:  [16-20] 20  SpO2:  [98 %-100 %] 99 %  BP: (102-159)/(51-78) 128/62     Weight: 77.5 kg (170 lb 12.8 oz) (09/12/17 0407)  Body mass index is 30.26 kg/m².      Intake/Output Summary (Last 24 hours) at 09/12/17 1544  Last data filed at 09/12/17 0600   Gross per 24 hour   Intake              700 ml   Output                0 ml   Net              700 ml       Lines/Drains/Airways     Peripheral Intravenous Line                 Peripheral IV - Single Lumen 09/12/17 0129 Left Forearm less than 1 day                 Physical Exam   Constitutional: She is oriented to person, place, and time. Vital signs are normal. She appears well-developed and well-nourished.   HENT:   Head: Normocephalic and atraumatic.   Eyes: EOM are normal. Scleral icterus is present.   Neck: Normal range of motion. Neck supple. Carotid bruit is not present.   Cardiovascular: Normal rate and regular rhythm.    No murmur heard.  Pulmonary/Chest: Effort normal and breath sounds normal. No respiratory distress. She has no wheezes.   Abdominal: Normal appearance and bowel sounds are normal. She exhibits distension. She exhibits no mass. There is tenderness (mild generalized). There is no rebound and no guarding.   Musculoskeletal: She exhibits no edema.   Neurological: She is alert and oriented to person, place, and time. No cranial nerve deficit.   Skin: Skin is warm and dry. No rash noted.   Psychiatric: Her behavior is normal.       Significant Labs:  CBC:   Recent Labs  Lab 09/11/17 2047 09/12/17  0543   WBC 11.55 9.72   HGB 8.7* 8.2*   HCT 27.4* 25.5*   * 126*     CMP:   Recent Labs  Lab 09/12/17  0543   *   CALCIUM 7.7*   ALBUMIN 2.1*   PROT 6.7      K 2.8*   CO2 29   CL 98   BUN 4*   CREATININE 0.7   ALKPHOS 137*   ALT 20   AST 67*   BILITOT 11.4*     Coagulation:   Recent Labs  Lab 09/11/17  2045   INR 2.1*   APTT 32.4*       Significant Imaging:  Imaging results within the past 24 hours have been reviewed.    Assessment/Plan:     * Chest pain    Resolved.         Liver cirrhosis secondary to PERKINS    50 yo female with decompensated liver cirrhosis.   Recommend paracentesis for new ascites. Send fluid for analysis.   Monitor MELD labs daily. In April, it was documented as 15.     MELD-Na score: 25 at 9/12/2017  5:43 AM  MELD score: 24 at 9/12/2017  5:43 AM  Calculated from:  Serum Creatinine: 0.7 mg/dL (Rounded to 1) at 9/12/2017  5:43 AM  Serum Sodium: 136 mmol/L at 9/12/2017  5:43 AM  Total Bilirubin: 11.4 mg/dL at  meconium plugs, ileal perforation followed by 7 cm ileal resection; ostomy and mucous fistula formation. Zosyn -9/3 due to abd wall erythema which resolved. Donor milk stopped 10/10. Tolerating feeds very well with good growth for 20 g/day. Ostomy output is low  Plan: Monitor stoma output and weight gain. Peds surgery remains on consult   Will follow up as outpatient with plan to go home with ostomy and connect at later date.  BPD (bronchopulmonary dysplasia) 2021     Priority: High     Assessment:  26 6/7 weeks, resuscitated and intubated in DR, Xray- RDS. Curosurf given. Admitted on SIMV- weaned to bCPAP on .  weaned to VT- intubated for OR - remained on vent from  - , bCPAP  - , weaned to VT; then to [facility]  but had increased Fio2 needs and retractions thus placed back on VT that night. weaned off VT to room air on 10/5, so far tolerating. 1 diony/desat documented in the last 24 hours - while in car seat and associated with emesis - required stim and suctioning. Last event 10/15, which required suctioning and was also associated with emesis. Plan: Continue to monitor tolerance to room air. Monitor for ABD's             infant, 1,750-1,999 grams 2021     Priority: High     See GA Dx                          Inadequate oral intake 2021     Priority: Medium     Assessment: Status post ileal perforation.  PICC line placed. Hypoalbuminemia improving, s/p alb infusions. Na 9/15- 136,on sodium supplement. PICC line was removed  due to swelling of the leg with phlebitis. Tolerating feeds DM+HMF 22 trent/oz 11.5 ml/hr at 160 ml/kg/d, stoma output in last 24 hr- 33 ml. Po feeds started on 10/4. 10/9 feeds at 10 ml q 6 hrs, tolerating, feeds condensed to 31 ml q 3 hrs to run over 2 hours. 10/13 SSC 22 trent 38 ml Q 3. Changed to Neosure 22 trent/oz on 10/15. % of offered feeds per IDF protocol, took 154.9 ml/kg.   NG removed by 9/12/2017  5:43 AM  INR(ratio): 2.1 at 9/11/2017  8:45 PM  Age: 51 years          Abnormal CT scan, small bowel    CT scan suggested enteritis, but lacked oral contrast which limits the study. Also ascites can give a similar appearance. No reason to think she has enteritis at this time. If needed, could consider SBFT.         Hypophosphatemia    Replaced and improved.   Monitor        Hypomagnesemia    Replaced and improved.   Monitor        Ascites    Paracentesis will be ordered with fluid analysis.         Thrombocytopenia, unspecified    Secondary to cirrhosis.   No overt bleeding.         Jaundice    Secondary to elevated bilirubin.         Hypokalemia    Needs additional replacement per HM.   Monitor        Anemia    Chronic, stable during admission. Follows with Hematology as an outpatient.   No overt bleeding.               Thank you for your consult. I will follow-up with patient. Please contact us if you have any additional questions.    Nabil Acosta PA-C  Gastroenterology  [facility]   Hct on 8/9 is 36.7, not symptomatic. 8/12 hct 31.1. S/P pRBC transfusion 8/13.  8/20 Hgb 10.1 / Hct 30.2 and transfused, HCT raised to 35 on 8/20 but hypotensive on increased vent settings so second RBC transfusion given 8/20. HCT increased to 44 on 8/21. Hct 32.8 on 9/13, HCT 27.9 on 09/27, 10/4 Hct 25.1, retic 5.3.  10/18 Hct 23.1 retic 5.4. Noted desaturation failed car seat test 10/17  Plan: Transfuse packed red blood cells. continue multivitamin with iron. Projected hospital stay of  less than 1 more week. The medical necessity for inpatient hospital care is based on the above stated problem list and treatment modalities.      Electronically signed by Mando Collazo MD on 2021 at 4:10 PM

## 2021-01-08 ENCOUNTER — PATIENT MESSAGE (OUTPATIENT)
Dept: TRANSPLANT | Facility: CLINIC | Age: 56
End: 2021-01-08

## 2021-01-19 ENCOUNTER — LAB VISIT (OUTPATIENT)
Dept: LAB | Facility: HOSPITAL | Age: 56
End: 2021-01-19
Attending: INTERNAL MEDICINE
Payer: MEDICAID

## 2021-01-19 DIAGNOSIS — Z94.4 LIVER REPLACED BY TRANSPLANT: ICD-10-CM

## 2021-01-19 LAB
ALBUMIN SERPL BCP-MCNC: 3.4 G/DL (ref 3.5–5.2)
ALP SERPL-CCNC: 161 U/L (ref 55–135)
ALT SERPL W/O P-5'-P-CCNC: 16 U/L (ref 10–44)
ANION GAP SERPL CALC-SCNC: 9 MMOL/L (ref 8–16)
AST SERPL-CCNC: 14 U/L (ref 10–40)
BASOPHILS # BLD AUTO: 0.05 K/UL (ref 0–0.2)
BASOPHILS NFR BLD: 0.4 % (ref 0–1.9)
BILIRUB SERPL-MCNC: 0.3 MG/DL (ref 0.1–1)
BUN SERPL-MCNC: 16 MG/DL (ref 6–20)
CALCIUM SERPL-MCNC: 9.2 MG/DL (ref 8.7–10.5)
CHLORIDE SERPL-SCNC: 107 MMOL/L (ref 95–110)
CO2 SERPL-SCNC: 26 MMOL/L (ref 23–29)
CREAT SERPL-MCNC: 1 MG/DL (ref 0.5–1.4)
DIFFERENTIAL METHOD: ABNORMAL
EOSINOPHIL # BLD AUTO: 0.2 K/UL (ref 0–0.5)
EOSINOPHIL NFR BLD: 1.2 % (ref 0–8)
ERYTHROCYTE [DISTWIDTH] IN BLOOD BY AUTOMATED COUNT: 16.8 % (ref 11.5–14.5)
EST. GFR  (AFRICAN AMERICAN): >60 ML/MIN/1.73 M^2
EST. GFR  (NON AFRICAN AMERICAN): >60 ML/MIN/1.73 M^2
GLUCOSE SERPL-MCNC: 94 MG/DL (ref 70–110)
HCT VFR BLD AUTO: 38.2 % (ref 37–48.5)
HGB BLD-MCNC: 11.4 G/DL (ref 12–16)
IMM GRANULOCYTES # BLD AUTO: 0.03 K/UL (ref 0–0.04)
IMM GRANULOCYTES NFR BLD AUTO: 0.2 % (ref 0–0.5)
LYMPHOCYTES # BLD AUTO: 2.5 K/UL (ref 1–4.8)
LYMPHOCYTES NFR BLD: 20.3 % (ref 18–48)
MAGNESIUM SERPL-MCNC: 1.5 MG/DL (ref 1.6–2.6)
MCH RBC QN AUTO: 23.5 PG (ref 27–31)
MCHC RBC AUTO-ENTMCNC: 29.8 G/DL (ref 32–36)
MCV RBC AUTO: 79 FL (ref 82–98)
MONOCYTES # BLD AUTO: 0.7 K/UL (ref 0.3–1)
MONOCYTES NFR BLD: 5.5 % (ref 4–15)
NEUTROPHILS # BLD AUTO: 8.8 K/UL (ref 1.8–7.7)
NEUTROPHILS NFR BLD: 72.4 % (ref 38–73)
NRBC BLD-RTO: 0 /100 WBC
PLATELET # BLD AUTO: 79 K/UL (ref 150–350)
PMV BLD AUTO: 11.1 FL (ref 9.2–12.9)
POTASSIUM SERPL-SCNC: 3.8 MMOL/L (ref 3.5–5.1)
PROT SERPL-MCNC: 7.1 G/DL (ref 6–8.4)
RBC # BLD AUTO: 4.85 M/UL (ref 4–5.4)
SODIUM SERPL-SCNC: 142 MMOL/L (ref 136–145)
WBC # BLD AUTO: 12.14 K/UL (ref 3.9–12.7)

## 2021-01-19 PROCEDURE — 36415 COLL VENOUS BLD VENIPUNCTURE: CPT

## 2021-01-19 PROCEDURE — 80053 COMPREHEN METABOLIC PANEL: CPT

## 2021-01-19 PROCEDURE — 85025 COMPLETE CBC W/AUTO DIFF WBC: CPT

## 2021-01-19 PROCEDURE — 80197 ASSAY OF TACROLIMUS: CPT

## 2021-01-19 PROCEDURE — 83735 ASSAY OF MAGNESIUM: CPT

## 2021-01-20 ENCOUNTER — TELEPHONE (OUTPATIENT)
Dept: TRANSPLANT | Facility: CLINIC | Age: 56
End: 2021-01-20

## 2021-01-20 LAB — TACROLIMUS BLD-MCNC: 5.8 NG/ML (ref 5–15)

## 2021-01-21 ENCOUNTER — TELEPHONE (OUTPATIENT)
Dept: HEPATOLOGY | Facility: CLINIC | Age: 56
End: 2021-01-21

## 2021-01-22 ENCOUNTER — TELEPHONE (OUTPATIENT)
Dept: PAIN MEDICINE | Facility: CLINIC | Age: 56
End: 2021-01-22

## 2021-01-27 ENCOUNTER — OFFICE VISIT (OUTPATIENT)
Dept: HEPATOLOGY | Facility: CLINIC | Age: 56
End: 2021-01-27
Payer: MEDICAID

## 2021-01-27 ENCOUNTER — IMMUNIZATION (OUTPATIENT)
Dept: PHARMACY | Facility: CLINIC | Age: 56
End: 2021-01-27
Payer: MEDICAID

## 2021-01-27 ENCOUNTER — TELEPHONE (OUTPATIENT)
Dept: HEPATOLOGY | Facility: CLINIC | Age: 56
End: 2021-01-27

## 2021-01-27 VITALS
HEIGHT: 65 IN | DIASTOLIC BLOOD PRESSURE: 86 MMHG | SYSTOLIC BLOOD PRESSURE: 136 MMHG | BODY MASS INDEX: 34.49 KG/M2 | WEIGHT: 207 LBS | HEART RATE: 92 BPM

## 2021-01-27 DIAGNOSIS — D84.9 IMMUNOSUPPRESSION: Primary | ICD-10-CM

## 2021-01-27 DIAGNOSIS — R06.02 SOB (SHORTNESS OF BREATH): ICD-10-CM

## 2021-01-27 DIAGNOSIS — Z94.4 LIVER TRANSPLANTED: ICD-10-CM

## 2021-01-27 DIAGNOSIS — R53.83 FATIGUE, UNSPECIFIED TYPE: ICD-10-CM

## 2021-01-27 PROCEDURE — 99213 OFFICE O/P EST LOW 20 MIN: CPT | Mod: S$PBB,,, | Performed by: INTERNAL MEDICINE

## 2021-01-27 PROCEDURE — 99213 PR OFFICE/OUTPT VISIT, EST, LEVL III, 20-29 MIN: ICD-10-PCS | Mod: S$PBB,,, | Performed by: INTERNAL MEDICINE

## 2021-01-27 PROCEDURE — 99999 PR PBB SHADOW E&M-EST. PATIENT-LVL IV: CPT | Mod: PBBFAC,,, | Performed by: INTERNAL MEDICINE

## 2021-01-27 PROCEDURE — 99999 PR PBB SHADOW E&M-EST. PATIENT-LVL IV: ICD-10-PCS | Mod: PBBFAC,,, | Performed by: INTERNAL MEDICINE

## 2021-01-27 PROCEDURE — 99214 OFFICE O/P EST MOD 30 MIN: CPT | Mod: PBBFAC | Performed by: INTERNAL MEDICINE

## 2021-01-28 ENCOUNTER — PATIENT MESSAGE (OUTPATIENT)
Dept: INTERNAL MEDICINE | Facility: CLINIC | Age: 56
End: 2021-01-28

## 2021-01-29 ENCOUNTER — OFFICE VISIT (OUTPATIENT)
Dept: PAIN MEDICINE | Facility: CLINIC | Age: 56
End: 2021-01-29
Payer: MEDICAID

## 2021-01-29 ENCOUNTER — PATIENT MESSAGE (OUTPATIENT)
Dept: INTERNAL MEDICINE | Facility: CLINIC | Age: 56
End: 2021-01-29

## 2021-01-29 VITALS
HEIGHT: 65 IN | WEIGHT: 207.44 LBS | BODY MASS INDEX: 34.56 KG/M2 | SYSTOLIC BLOOD PRESSURE: 146 MMHG | HEART RATE: 93 BPM | DIASTOLIC BLOOD PRESSURE: 82 MMHG

## 2021-01-29 DIAGNOSIS — M70.62 GREATER TROCHANTERIC BURSITIS OF BOTH HIPS: ICD-10-CM

## 2021-01-29 DIAGNOSIS — M79.18 PIRIFORMIS MUSCLE PAIN: Primary | ICD-10-CM

## 2021-01-29 DIAGNOSIS — M46.1 SACROILIITIS: ICD-10-CM

## 2021-01-29 DIAGNOSIS — M70.61 GREATER TROCHANTERIC BURSITIS OF BOTH HIPS: ICD-10-CM

## 2021-01-29 PROCEDURE — 99214 OFFICE O/P EST MOD 30 MIN: CPT | Mod: S$PBB,,, | Performed by: PHYSICIAN ASSISTANT

## 2021-01-29 PROCEDURE — 99999 PR PBB SHADOW E&M-EST. PATIENT-LVL V: ICD-10-PCS | Mod: PBBFAC,,, | Performed by: PHYSICIAN ASSISTANT

## 2021-01-29 PROCEDURE — 99214 PR OFFICE/OUTPT VISIT, EST, LEVL IV, 30-39 MIN: ICD-10-PCS | Mod: S$PBB,,, | Performed by: PHYSICIAN ASSISTANT

## 2021-01-29 PROCEDURE — 99215 OFFICE O/P EST HI 40 MIN: CPT | Mod: PBBFAC | Performed by: PHYSICIAN ASSISTANT

## 2021-01-29 PROCEDURE — 99999 PR PBB SHADOW E&M-EST. PATIENT-LVL V: CPT | Mod: PBBFAC,,, | Performed by: PHYSICIAN ASSISTANT

## 2021-01-30 RX ORDER — PANTOPRAZOLE SODIUM 40 MG/1
40 TABLET, DELAYED RELEASE ORAL DAILY
Qty: 30 TABLET | Refills: 1 | Status: SHIPPED | OUTPATIENT
Start: 2021-01-30 | End: 2021-03-31 | Stop reason: SDUPTHER

## 2021-02-01 ENCOUNTER — PATIENT MESSAGE (OUTPATIENT)
Dept: PAIN MEDICINE | Facility: CLINIC | Age: 56
End: 2021-02-01

## 2021-02-01 DIAGNOSIS — M25.612 DECREASED SHOULDER MOBILITY, LEFT: ICD-10-CM

## 2021-02-01 DIAGNOSIS — G62.9 NEUROPATHY: ICD-10-CM

## 2021-02-01 RX ORDER — CYCLOBENZAPRINE HCL 10 MG
TABLET ORAL
Qty: 90 TABLET | Refills: 0 | Status: SHIPPED | OUTPATIENT
Start: 2021-02-01 | End: 2021-03-24 | Stop reason: SDUPTHER

## 2021-02-01 RX ORDER — GABAPENTIN 600 MG/1
TABLET ORAL
Qty: 90 TABLET | Refills: 1 | Status: SHIPPED | OUTPATIENT
Start: 2021-02-01 | End: 2021-03-24 | Stop reason: SDUPTHER

## 2021-02-01 RX ORDER — GABAPENTIN 300 MG/1
300 CAPSULE ORAL 3 TIMES DAILY
Qty: 120 CAPSULE | Refills: 1 | Status: SHIPPED | OUTPATIENT
Start: 2021-02-01 | End: 2021-03-24 | Stop reason: SDUPTHER

## 2021-02-08 ENCOUNTER — TELEPHONE (OUTPATIENT)
Dept: PAIN MEDICINE | Facility: CLINIC | Age: 56
End: 2021-02-08

## 2021-02-08 ENCOUNTER — HOSPITAL ENCOUNTER (OUTPATIENT)
Facility: HOSPITAL | Age: 56
Discharge: HOME | End: 2021-02-08
Attending: ANESTHESIOLOGY | Admitting: ANESTHESIOLOGY
Payer: MEDICAID

## 2021-02-08 VITALS
DIASTOLIC BLOOD PRESSURE: 76 MMHG | RESPIRATION RATE: 16 BRPM | HEIGHT: 65 IN | WEIGHT: 200.63 LBS | HEART RATE: 84 BPM | BODY MASS INDEX: 33.43 KG/M2 | OXYGEN SATURATION: 99 % | TEMPERATURE: 98 F | SYSTOLIC BLOOD PRESSURE: 128 MMHG

## 2021-02-08 DIAGNOSIS — M70.62 GREATER TROCHANTERIC BURSITIS OF BOTH HIPS: ICD-10-CM

## 2021-02-08 DIAGNOSIS — M79.18 PIRIFORMIS MUSCLE PAIN: ICD-10-CM

## 2021-02-08 DIAGNOSIS — M70.61 GREATER TROCHANTERIC BURSITIS OF BOTH HIPS: ICD-10-CM

## 2021-02-08 DIAGNOSIS — M46.1 SACROILIITIS: ICD-10-CM

## 2021-02-08 PROCEDURE — 25500020 PHARM REV CODE 255: Performed by: ANESTHESIOLOGY

## 2021-02-08 PROCEDURE — 77002 NEEDLE LOCALIZATION BY XRAY: CPT | Mod: 26,,, | Performed by: ANESTHESIOLOGY

## 2021-02-08 PROCEDURE — 63600175 PHARM REV CODE 636 W HCPCS: Performed by: ANESTHESIOLOGY

## 2021-02-08 PROCEDURE — 77002 PR FLUOROSCOPIC GUIDANCE NEEDLE PLACEMENT: ICD-10-PCS | Mod: 26,,, | Performed by: ANESTHESIOLOGY

## 2021-02-08 PROCEDURE — 20610 PR DRAIN/INJECT LARGE JOINT/BURSA: ICD-10-PCS | Mod: 50,,, | Performed by: ANESTHESIOLOGY

## 2021-02-08 PROCEDURE — 20610 DRAIN/INJ JOINT/BURSA W/O US: CPT | Mod: 50,,, | Performed by: ANESTHESIOLOGY

## 2021-02-08 PROCEDURE — 99152 MOD SED SAME PHYS/QHP 5/>YRS: CPT | Performed by: ANESTHESIOLOGY

## 2021-02-08 PROCEDURE — 25000003 PHARM REV CODE 250: Performed by: ANESTHESIOLOGY

## 2021-02-08 PROCEDURE — 20610 DRAIN/INJ JOINT/BURSA W/O US: CPT | Mod: 50 | Performed by: ANESTHESIOLOGY

## 2021-02-08 RX ORDER — BUPIVACAINE HYDROCHLORIDE 2.5 MG/ML
INJECTION, SOLUTION EPIDURAL; INFILTRATION; INTRACAUDAL
Status: DISCONTINUED | OUTPATIENT
Start: 2021-02-08 | End: 2021-02-08 | Stop reason: HOSPADM

## 2021-02-08 RX ORDER — METHYLPREDNISOLONE ACETATE 80 MG/ML
INJECTION, SUSPENSION INTRA-ARTICULAR; INTRALESIONAL; INTRAMUSCULAR; SOFT TISSUE
Status: DISCONTINUED | OUTPATIENT
Start: 2021-02-08 | End: 2021-02-08 | Stop reason: HOSPADM

## 2021-02-08 RX ORDER — MIDAZOLAM HYDROCHLORIDE 1 MG/ML
INJECTION, SOLUTION INTRAMUSCULAR; INTRAVENOUS
Status: DISCONTINUED | OUTPATIENT
Start: 2021-02-08 | End: 2021-02-08 | Stop reason: HOSPADM

## 2021-02-08 RX ORDER — FENTANYL CITRATE 50 UG/ML
INJECTION, SOLUTION INTRAMUSCULAR; INTRAVENOUS
Status: DISCONTINUED | OUTPATIENT
Start: 2021-02-08 | End: 2021-02-08 | Stop reason: HOSPADM

## 2021-02-09 ENCOUNTER — PATIENT MESSAGE (OUTPATIENT)
Dept: PAIN MEDICINE | Facility: CLINIC | Age: 56
End: 2021-02-09

## 2021-02-09 ENCOUNTER — PATIENT MESSAGE (OUTPATIENT)
Dept: SLEEP MEDICINE | Facility: CLINIC | Age: 56
End: 2021-02-09

## 2021-02-15 ENCOUNTER — PATIENT MESSAGE (OUTPATIENT)
Dept: INTERNAL MEDICINE | Facility: CLINIC | Age: 56
End: 2021-02-15

## 2021-02-15 ENCOUNTER — PATIENT MESSAGE (OUTPATIENT)
Dept: TRANSPLANT | Facility: CLINIC | Age: 56
End: 2021-02-15

## 2021-02-15 DIAGNOSIS — Z79.899 LONG TERM CURRENT USE OF DIURETIC: Primary | Chronic | ICD-10-CM

## 2021-02-16 PROBLEM — Z79.899 LONG TERM CURRENT USE OF DIURETIC: Chronic | Status: ACTIVE | Noted: 2021-02-16

## 2021-02-16 RX ORDER — POTASSIUM CHLORIDE 750 MG/1
20 TABLET, EXTENDED RELEASE ORAL DAILY
Qty: 180 TABLET | Refills: 4 | Status: SHIPPED | OUTPATIENT
Start: 2021-02-16 | End: 2021-09-09

## 2021-03-01 ENCOUNTER — PATIENT MESSAGE (OUTPATIENT)
Dept: INTERNAL MEDICINE | Facility: CLINIC | Age: 56
End: 2021-03-01

## 2021-03-01 ENCOUNTER — PATIENT MESSAGE (OUTPATIENT)
Dept: PAIN MEDICINE | Facility: CLINIC | Age: 56
End: 2021-03-01

## 2021-03-03 ENCOUNTER — PATIENT MESSAGE (OUTPATIENT)
Dept: PAIN MEDICINE | Facility: CLINIC | Age: 56
End: 2021-03-03

## 2021-03-03 DIAGNOSIS — R60.0 BILATERAL LEG EDEMA: ICD-10-CM

## 2021-03-03 DIAGNOSIS — Z29.89 PROPHYLACTIC IMMUNOTHERAPY: ICD-10-CM

## 2021-03-03 RX ORDER — FUROSEMIDE 40 MG/1
40 TABLET ORAL DAILY
Qty: 30 TABLET | Refills: 2 | Status: SHIPPED | OUTPATIENT
Start: 2021-03-03 | End: 2021-05-20 | Stop reason: SDUPTHER

## 2021-03-10 ENCOUNTER — PATIENT MESSAGE (OUTPATIENT)
Dept: PAIN MEDICINE | Facility: CLINIC | Age: 56
End: 2021-03-10

## 2021-03-15 ENCOUNTER — PATIENT MESSAGE (OUTPATIENT)
Dept: PAIN MEDICINE | Facility: CLINIC | Age: 56
End: 2021-03-15

## 2021-03-17 ENCOUNTER — PATIENT MESSAGE (OUTPATIENT)
Dept: PAIN MEDICINE | Facility: CLINIC | Age: 56
End: 2021-03-17

## 2021-03-24 ENCOUNTER — OFFICE VISIT (OUTPATIENT)
Dept: PAIN MEDICINE | Facility: CLINIC | Age: 56
End: 2021-03-24
Payer: MEDICAID

## 2021-03-24 ENCOUNTER — PATIENT MESSAGE (OUTPATIENT)
Dept: PAIN MEDICINE | Facility: CLINIC | Age: 56
End: 2021-03-24

## 2021-03-24 VITALS
DIASTOLIC BLOOD PRESSURE: 82 MMHG | HEART RATE: 88 BPM | WEIGHT: 207 LBS | RESPIRATION RATE: 18 BRPM | BODY MASS INDEX: 34.49 KG/M2 | SYSTOLIC BLOOD PRESSURE: 116 MMHG | HEIGHT: 65 IN

## 2021-03-24 DIAGNOSIS — M70.62 GREATER TROCHANTERIC BURSITIS OF BOTH HIPS: Primary | ICD-10-CM

## 2021-03-24 DIAGNOSIS — M70.61 GREATER TROCHANTERIC BURSITIS OF BOTH HIPS: Primary | ICD-10-CM

## 2021-03-24 DIAGNOSIS — M79.18 PIRIFORMIS MUSCLE PAIN: ICD-10-CM

## 2021-03-24 DIAGNOSIS — G62.9 NEUROPATHY: ICD-10-CM

## 2021-03-24 DIAGNOSIS — M46.1 SACROILIITIS: ICD-10-CM

## 2021-03-24 PROCEDURE — 99214 OFFICE O/P EST MOD 30 MIN: CPT | Mod: S$PBB,,, | Performed by: PHYSICIAN ASSISTANT

## 2021-03-24 PROCEDURE — 99999 PR PBB SHADOW E&M-EST. PATIENT-LVL V: ICD-10-PCS | Mod: PBBFAC,,, | Performed by: PHYSICIAN ASSISTANT

## 2021-03-24 PROCEDURE — 99215 OFFICE O/P EST HI 40 MIN: CPT | Mod: PBBFAC | Performed by: PHYSICIAN ASSISTANT

## 2021-03-24 PROCEDURE — 99214 PR OFFICE/OUTPT VISIT, EST, LEVL IV, 30-39 MIN: ICD-10-PCS | Mod: S$PBB,,, | Performed by: PHYSICIAN ASSISTANT

## 2021-03-24 PROCEDURE — 99999 PR PBB SHADOW E&M-EST. PATIENT-LVL V: CPT | Mod: PBBFAC,,, | Performed by: PHYSICIAN ASSISTANT

## 2021-03-24 RX ORDER — CYCLOBENZAPRINE HCL 10 MG
5-10 TABLET ORAL 3 TIMES DAILY PRN
Qty: 90 TABLET | Refills: 1 | Status: SHIPPED | OUTPATIENT
Start: 2021-03-24 | End: 2021-04-23

## 2021-03-24 RX ORDER — GABAPENTIN 300 MG/1
300 CAPSULE ORAL 3 TIMES DAILY
Qty: 120 CAPSULE | Refills: 1 | Status: SHIPPED | OUTPATIENT
Start: 2021-03-24 | End: 2021-08-06 | Stop reason: SDUPTHER

## 2021-03-24 RX ORDER — GABAPENTIN 600 MG/1
600 TABLET ORAL 3 TIMES DAILY
Qty: 90 TABLET | Refills: 1 | Status: SHIPPED | OUTPATIENT
Start: 2021-03-24 | End: 2021-04-23

## 2021-03-31 ENCOUNTER — PATIENT MESSAGE (OUTPATIENT)
Dept: PAIN MEDICINE | Facility: CLINIC | Age: 56
End: 2021-03-31

## 2021-03-31 ENCOUNTER — PATIENT MESSAGE (OUTPATIENT)
Dept: INTERNAL MEDICINE | Facility: CLINIC | Age: 56
End: 2021-03-31

## 2021-03-31 DIAGNOSIS — K21.00 GASTROESOPHAGEAL REFLUX DISEASE WITH ESOPHAGITIS WITHOUT HEMORRHAGE: Primary | ICD-10-CM

## 2021-03-31 RX ORDER — PANTOPRAZOLE SODIUM 40 MG/1
40 TABLET, DELAYED RELEASE ORAL DAILY
Qty: 90 TABLET | Refills: 3 | Status: SHIPPED | OUTPATIENT
Start: 2021-03-31 | End: 2021-12-13 | Stop reason: SDUPTHER

## 2021-04-03 RX ORDER — PANTOPRAZOLE SODIUM 40 MG/1
TABLET, DELAYED RELEASE ORAL
Qty: 30 TABLET | Refills: 1 | OUTPATIENT
Start: 2021-04-03

## 2021-04-06 DIAGNOSIS — E83.42 HYPOMAGNESEMIA: ICD-10-CM

## 2021-04-06 RX ORDER — LANOLIN ALCOHOL/MO/W.PET/CERES
2 CREAM (GRAM) TOPICAL 2 TIMES DAILY
Qty: 270 TABLET | Refills: 2 | Status: SHIPPED | OUTPATIENT
Start: 2021-04-06 | End: 2022-01-03 | Stop reason: DRUGHIGH

## 2021-04-13 ENCOUNTER — OFFICE VISIT (OUTPATIENT)
Dept: SLEEP MEDICINE | Facility: CLINIC | Age: 56
End: 2021-04-13
Payer: MEDICAID

## 2021-04-13 ENCOUNTER — LAB VISIT (OUTPATIENT)
Dept: LAB | Facility: HOSPITAL | Age: 56
End: 2021-04-13
Attending: INTERNAL MEDICINE
Payer: MEDICAID

## 2021-04-13 VITALS
HEART RATE: 88 BPM | SYSTOLIC BLOOD PRESSURE: 112 MMHG | OXYGEN SATURATION: 100 % | BODY MASS INDEX: 34.38 KG/M2 | HEIGHT: 65 IN | DIASTOLIC BLOOD PRESSURE: 78 MMHG | RESPIRATION RATE: 17 BRPM | WEIGHT: 206.38 LBS

## 2021-04-13 DIAGNOSIS — G47.33 OSA (OBSTRUCTIVE SLEEP APNEA): Primary | ICD-10-CM

## 2021-04-13 DIAGNOSIS — Z94.4 LIVER REPLACED BY TRANSPLANT: ICD-10-CM

## 2021-04-13 DIAGNOSIS — E66.9 OBESITY, CLASS I, BMI 30-34.9: ICD-10-CM

## 2021-04-13 LAB
ALBUMIN SERPL BCP-MCNC: 3.6 G/DL (ref 3.5–5.2)
ALP SERPL-CCNC: 204 U/L (ref 55–135)
ALT SERPL W/O P-5'-P-CCNC: 14 U/L (ref 10–44)
ANION GAP SERPL CALC-SCNC: 10 MMOL/L (ref 8–16)
AST SERPL-CCNC: 13 U/L (ref 10–40)
BASOPHILS # BLD AUTO: 0.03 K/UL (ref 0–0.2)
BASOPHILS NFR BLD: 0.3 % (ref 0–1.9)
BILIRUB SERPL-MCNC: 0.5 MG/DL (ref 0.1–1)
BUN SERPL-MCNC: 13 MG/DL (ref 6–20)
CALCIUM SERPL-MCNC: 9 MG/DL (ref 8.7–10.5)
CHLORIDE SERPL-SCNC: 103 MMOL/L (ref 95–110)
CO2 SERPL-SCNC: 26 MMOL/L (ref 23–29)
CREAT SERPL-MCNC: 0.9 MG/DL (ref 0.5–1.4)
DIFFERENTIAL METHOD: ABNORMAL
EOSINOPHIL # BLD AUTO: 0.1 K/UL (ref 0–0.5)
EOSINOPHIL NFR BLD: 1.2 % (ref 0–8)
ERYTHROCYTE [DISTWIDTH] IN BLOOD BY AUTOMATED COUNT: 16.3 % (ref 11.5–14.5)
EST. GFR  (AFRICAN AMERICAN): >60 ML/MIN/1.73 M^2
EST. GFR  (NON AFRICAN AMERICAN): >60 ML/MIN/1.73 M^2
GLUCOSE SERPL-MCNC: 99 MG/DL (ref 70–110)
HCT VFR BLD AUTO: 38.4 % (ref 37–48.5)
HGB BLD-MCNC: 11.6 G/DL (ref 12–16)
IMM GRANULOCYTES # BLD AUTO: 0.04 K/UL (ref 0–0.04)
IMM GRANULOCYTES NFR BLD AUTO: 0.4 % (ref 0–0.5)
LYMPHOCYTES # BLD AUTO: 2.3 K/UL (ref 1–4.8)
LYMPHOCYTES NFR BLD: 21.8 % (ref 18–48)
MAGNESIUM SERPL-MCNC: 1.7 MG/DL (ref 1.6–2.6)
MCH RBC QN AUTO: 23.2 PG (ref 27–31)
MCHC RBC AUTO-ENTMCNC: 30.2 G/DL (ref 32–36)
MCV RBC AUTO: 77 FL (ref 82–98)
MONOCYTES # BLD AUTO: 0.6 K/UL (ref 0.3–1)
MONOCYTES NFR BLD: 6 % (ref 4–15)
NEUTROPHILS # BLD AUTO: 7.5 K/UL (ref 1.8–7.7)
NEUTROPHILS NFR BLD: 70.3 % (ref 38–73)
NRBC BLD-RTO: 0 /100 WBC
PLATELET # BLD AUTO: 91 K/UL (ref 150–450)
PMV BLD AUTO: 10.4 FL (ref 9.2–12.9)
POTASSIUM SERPL-SCNC: 3.7 MMOL/L (ref 3.5–5.1)
PROT SERPL-MCNC: 7.8 G/DL (ref 6–8.4)
RBC # BLD AUTO: 5 M/UL (ref 4–5.4)
SODIUM SERPL-SCNC: 139 MMOL/L (ref 136–145)
WBC # BLD AUTO: 10.62 K/UL (ref 3.9–12.7)

## 2021-04-13 PROCEDURE — 83735 ASSAY OF MAGNESIUM: CPT | Performed by: INTERNAL MEDICINE

## 2021-04-13 PROCEDURE — 99999 PR PBB SHADOW E&M-EST. PATIENT-LVL IV: ICD-10-PCS | Mod: PBBFAC,,, | Performed by: NURSE PRACTITIONER

## 2021-04-13 PROCEDURE — 80197 ASSAY OF TACROLIMUS: CPT | Performed by: INTERNAL MEDICINE

## 2021-04-13 PROCEDURE — 80053 COMPREHEN METABOLIC PANEL: CPT | Performed by: INTERNAL MEDICINE

## 2021-04-13 PROCEDURE — 99213 PR OFFICE/OUTPT VISIT, EST, LEVL III, 20-29 MIN: ICD-10-PCS | Mod: S$PBB,,, | Performed by: NURSE PRACTITIONER

## 2021-04-13 PROCEDURE — 99213 OFFICE O/P EST LOW 20 MIN: CPT | Mod: S$PBB,,, | Performed by: NURSE PRACTITIONER

## 2021-04-13 PROCEDURE — 99214 OFFICE O/P EST MOD 30 MIN: CPT | Mod: PBBFAC | Performed by: NURSE PRACTITIONER

## 2021-04-13 PROCEDURE — 85025 COMPLETE CBC W/AUTO DIFF WBC: CPT | Performed by: INTERNAL MEDICINE

## 2021-04-13 PROCEDURE — 99999 PR PBB SHADOW E&M-EST. PATIENT-LVL IV: CPT | Mod: PBBFAC,,, | Performed by: NURSE PRACTITIONER

## 2021-04-13 PROCEDURE — 36415 COLL VENOUS BLD VENIPUNCTURE: CPT | Performed by: INTERNAL MEDICINE

## 2021-04-13 RX ORDER — FLUTICASONE PROPIONATE 50 MCG
2 SPRAY, SUSPENSION (ML) NASAL DAILY
Qty: 16 G | Refills: 11 | Status: ON HOLD | OUTPATIENT
Start: 2021-04-13 | End: 2021-10-28 | Stop reason: HOSPADM

## 2021-04-14 LAB — TACROLIMUS BLD-MCNC: 6.4 NG/ML (ref 5–15)

## 2021-04-15 ENCOUNTER — HOSPITAL ENCOUNTER (OUTPATIENT)
Facility: HOSPITAL | Age: 56
Discharge: HOME | End: 2021-04-15
Attending: ANESTHESIOLOGY | Admitting: ANESTHESIOLOGY
Payer: MEDICAID

## 2021-04-15 ENCOUNTER — TELEPHONE (OUTPATIENT)
Dept: TRANSPLANT | Facility: CLINIC | Age: 56
End: 2021-04-15

## 2021-04-15 ENCOUNTER — PATIENT MESSAGE (OUTPATIENT)
Dept: TRANSPLANT | Facility: CLINIC | Age: 56
End: 2021-04-15

## 2021-04-15 VITALS
RESPIRATION RATE: 18 BRPM | TEMPERATURE: 98 F | DIASTOLIC BLOOD PRESSURE: 81 MMHG | WEIGHT: 213.88 LBS | HEART RATE: 81 BPM | SYSTOLIC BLOOD PRESSURE: 124 MMHG | OXYGEN SATURATION: 98 % | HEIGHT: 65 IN | BODY MASS INDEX: 35.63 KG/M2

## 2021-04-15 DIAGNOSIS — M79.18 PIRIFORMIS MUSCLE PAIN: ICD-10-CM

## 2021-04-15 DIAGNOSIS — M70.62 GREATER TROCHANTERIC BURSITIS OF BOTH HIPS: ICD-10-CM

## 2021-04-15 DIAGNOSIS — M46.1 SACROILIITIS: Primary | ICD-10-CM

## 2021-04-15 DIAGNOSIS — M70.61 GREATER TROCHANTERIC BURSITIS OF BOTH HIPS: ICD-10-CM

## 2021-04-15 DIAGNOSIS — M53.3 SACROILIAC JOINT DYSFUNCTION: ICD-10-CM

## 2021-04-15 PROCEDURE — 20552 PR INJECT TRIGGER POINT, 1 OR 2: ICD-10-PCS | Mod: 59,,, | Performed by: ANESTHESIOLOGY

## 2021-04-15 PROCEDURE — 20553 NJX 1/MLT TRIGGER POINTS 3/>: CPT | Mod: 50 | Performed by: ANESTHESIOLOGY

## 2021-04-15 PROCEDURE — 20610 DRAIN/INJ JOINT/BURSA W/O US: CPT | Mod: 50,59,, | Performed by: ANESTHESIOLOGY

## 2021-04-15 PROCEDURE — 99152 MOD SED SAME PHYS/QHP 5/>YRS: CPT | Performed by: ANESTHESIOLOGY

## 2021-04-15 PROCEDURE — 27096 INJECT SACROILIAC JOINT: CPT | Mod: 50 | Performed by: ANESTHESIOLOGY

## 2021-04-15 PROCEDURE — 63600175 PHARM REV CODE 636 W HCPCS: Performed by: ANESTHESIOLOGY

## 2021-04-15 PROCEDURE — 20552 NJX 1/MLT TRIGGER POINT 1/2: CPT | Mod: 59,,, | Performed by: ANESTHESIOLOGY

## 2021-04-15 PROCEDURE — 27096 INJECT SACROILIAC JOINT: CPT | Mod: 50,,, | Performed by: ANESTHESIOLOGY

## 2021-04-15 PROCEDURE — 27096 PR INJECTION,SACROILIAC JOINT: ICD-10-PCS | Mod: 50,,, | Performed by: ANESTHESIOLOGY

## 2021-04-15 PROCEDURE — 20552 NJX 1/MLT TRIGGER POINT 1/2: CPT | Mod: 50 | Performed by: ANESTHESIOLOGY

## 2021-04-15 PROCEDURE — 25500020 PHARM REV CODE 255: Performed by: ANESTHESIOLOGY

## 2021-04-15 PROCEDURE — 25000003 PHARM REV CODE 250: Performed by: ANESTHESIOLOGY

## 2021-04-15 PROCEDURE — 20610 PR DRAIN/INJECT LARGE JOINT/BURSA: ICD-10-PCS | Mod: 50,59,, | Performed by: ANESTHESIOLOGY

## 2021-04-15 PROCEDURE — 20610 DRAIN/INJ JOINT/BURSA W/O US: CPT | Mod: 50 | Performed by: ANESTHESIOLOGY

## 2021-04-15 RX ORDER — LIDOCAINE HYDROCHLORIDE 10 MG/ML
INJECTION, SOLUTION EPIDURAL; INFILTRATION; INTRACAUDAL; PERINEURAL
Status: DISCONTINUED | OUTPATIENT
Start: 2021-04-15 | End: 2021-04-15 | Stop reason: HOSPADM

## 2021-04-15 RX ORDER — METHYLPREDNISOLONE ACETATE 40 MG/ML
INJECTION, SUSPENSION INTRA-ARTICULAR; INTRALESIONAL; INTRAMUSCULAR; SOFT TISSUE
Status: DISCONTINUED | OUTPATIENT
Start: 2021-04-15 | End: 2021-04-15 | Stop reason: HOSPADM

## 2021-04-15 RX ORDER — MIDAZOLAM HYDROCHLORIDE 1 MG/ML
INJECTION, SOLUTION INTRAMUSCULAR; INTRAVENOUS
Status: DISCONTINUED | OUTPATIENT
Start: 2021-04-15 | End: 2021-04-15 | Stop reason: HOSPADM

## 2021-04-15 RX ORDER — FENTANYL CITRATE 50 UG/ML
INJECTION, SOLUTION INTRAMUSCULAR; INTRAVENOUS
Status: DISCONTINUED | OUTPATIENT
Start: 2021-04-15 | End: 2021-04-15 | Stop reason: HOSPADM

## 2021-04-15 RX ORDER — LIDOCAINE HYDROCHLORIDE 20 MG/ML
INJECTION, SOLUTION EPIDURAL; INFILTRATION; INTRACAUDAL; PERINEURAL
Status: DISCONTINUED | OUTPATIENT
Start: 2021-04-15 | End: 2021-04-15 | Stop reason: HOSPADM

## 2021-04-27 ENCOUNTER — PATIENT MESSAGE (OUTPATIENT)
Dept: SLEEP MEDICINE | Facility: CLINIC | Age: 56
End: 2021-04-27

## 2021-04-27 ENCOUNTER — PATIENT MESSAGE (OUTPATIENT)
Dept: PAIN MEDICINE | Facility: CLINIC | Age: 56
End: 2021-04-27

## 2021-05-03 ENCOUNTER — TELEPHONE (OUTPATIENT)
Dept: PAIN MEDICINE | Facility: CLINIC | Age: 56
End: 2021-05-03

## 2021-05-04 ENCOUNTER — LAB VISIT (OUTPATIENT)
Dept: LAB | Facility: HOSPITAL | Age: 56
End: 2021-05-04
Attending: INTERNAL MEDICINE
Payer: MEDICAID

## 2021-05-04 ENCOUNTER — OFFICE VISIT (OUTPATIENT)
Dept: HEMATOLOGY/ONCOLOGY | Facility: CLINIC | Age: 56
End: 2021-05-04
Payer: MEDICAID

## 2021-05-04 VITALS
TEMPERATURE: 98 F | OXYGEN SATURATION: 98 % | HEART RATE: 90 BPM | RESPIRATION RATE: 16 BRPM | WEIGHT: 206.56 LBS | DIASTOLIC BLOOD PRESSURE: 82 MMHG | BODY MASS INDEX: 34.41 KG/M2 | SYSTOLIC BLOOD PRESSURE: 122 MMHG | HEIGHT: 65 IN

## 2021-05-04 DIAGNOSIS — D53.9 NUTRITIONAL ANEMIA: Primary | ICD-10-CM

## 2021-05-04 DIAGNOSIS — R63.4 UNINTENTIONAL WEIGHT LOSS OF 10% BODY WEIGHT WITHIN 6 MONTHS: ICD-10-CM

## 2021-05-04 DIAGNOSIS — I10 ESSENTIAL HYPERTENSION: ICD-10-CM

## 2021-05-04 DIAGNOSIS — D63.8 ANEMIA OF CHRONIC DISEASE: ICD-10-CM

## 2021-05-04 DIAGNOSIS — D64.9 ANEMIA, UNSPECIFIED TYPE: ICD-10-CM

## 2021-05-04 LAB
ALBUMIN SERPL BCP-MCNC: 3.4 G/DL (ref 3.5–5.2)
ALP SERPL-CCNC: 186 U/L (ref 55–135)
ALT SERPL W/O P-5'-P-CCNC: 14 U/L (ref 10–44)
ANION GAP SERPL CALC-SCNC: 11 MMOL/L (ref 8–16)
AST SERPL-CCNC: 14 U/L (ref 10–40)
BASOPHILS # BLD AUTO: 0.04 K/UL (ref 0–0.2)
BASOPHILS NFR BLD: 0.3 % (ref 0–1.9)
BILIRUB SERPL-MCNC: 0.5 MG/DL (ref 0.1–1)
BUN SERPL-MCNC: 17 MG/DL (ref 6–20)
CALCIUM SERPL-MCNC: 9.1 MG/DL (ref 8.7–10.5)
CHLORIDE SERPL-SCNC: 104 MMOL/L (ref 95–110)
CO2 SERPL-SCNC: 27 MMOL/L (ref 23–29)
CREAT SERPL-MCNC: 1.1 MG/DL (ref 0.5–1.4)
DIFFERENTIAL METHOD: ABNORMAL
EOSINOPHIL # BLD AUTO: 0.1 K/UL (ref 0–0.5)
EOSINOPHIL NFR BLD: 0.9 % (ref 0–8)
ERYTHROCYTE [DISTWIDTH] IN BLOOD BY AUTOMATED COUNT: 16.7 % (ref 11.5–14.5)
EST. GFR  (AFRICAN AMERICAN): >60 ML/MIN/1.73 M^2
EST. GFR  (NON AFRICAN AMERICAN): 56 ML/MIN/1.73 M^2
GLUCOSE SERPL-MCNC: 111 MG/DL (ref 70–110)
HCT VFR BLD AUTO: 39 % (ref 37–48.5)
HGB BLD-MCNC: 12 G/DL (ref 12–16)
IMM GRANULOCYTES # BLD AUTO: 0.07 K/UL (ref 0–0.04)
IMM GRANULOCYTES NFR BLD AUTO: 0.5 % (ref 0–0.5)
LYMPHOCYTES # BLD AUTO: 2.7 K/UL (ref 1–4.8)
LYMPHOCYTES NFR BLD: 19.7 % (ref 18–48)
MCH RBC QN AUTO: 23.7 PG (ref 27–31)
MCHC RBC AUTO-ENTMCNC: 30.8 G/DL (ref 32–36)
MCV RBC AUTO: 77 FL (ref 82–98)
MONOCYTES # BLD AUTO: 0.6 K/UL (ref 0.3–1)
MONOCYTES NFR BLD: 4.1 % (ref 4–15)
NEUTROPHILS # BLD AUTO: 10 K/UL (ref 1.8–7.7)
NEUTROPHILS NFR BLD: 74.5 % (ref 38–73)
NRBC BLD-RTO: 0 /100 WBC
PLATELET # BLD AUTO: 312 K/UL (ref 150–450)
PMV BLD AUTO: 9.4 FL (ref 9.2–12.9)
POTASSIUM SERPL-SCNC: 3.6 MMOL/L (ref 3.5–5.1)
PROT SERPL-MCNC: 7.4 G/DL (ref 6–8.4)
RBC # BLD AUTO: 5.06 M/UL (ref 4–5.4)
SODIUM SERPL-SCNC: 142 MMOL/L (ref 136–145)
WBC # BLD AUTO: 13.45 K/UL (ref 3.9–12.7)

## 2021-05-04 PROCEDURE — 36415 COLL VENOUS BLD VENIPUNCTURE: CPT | Performed by: INTERNAL MEDICINE

## 2021-05-04 PROCEDURE — 85025 COMPLETE CBC W/AUTO DIFF WBC: CPT | Performed by: INTERNAL MEDICINE

## 2021-05-04 PROCEDURE — 99999 PR PBB SHADOW E&M-EST. PATIENT-LVL III: ICD-10-PCS | Mod: PBBFAC,,, | Performed by: INTERNAL MEDICINE

## 2021-05-04 PROCEDURE — 99213 PR OFFICE/OUTPT VISIT, EST, LEVL III, 20-29 MIN: ICD-10-PCS | Mod: S$PBB,,, | Performed by: INTERNAL MEDICINE

## 2021-05-04 PROCEDURE — 99213 OFFICE O/P EST LOW 20 MIN: CPT | Mod: S$PBB,,, | Performed by: INTERNAL MEDICINE

## 2021-05-04 PROCEDURE — 80053 COMPREHEN METABOLIC PANEL: CPT | Performed by: INTERNAL MEDICINE

## 2021-05-04 PROCEDURE — 99213 OFFICE O/P EST LOW 20 MIN: CPT | Mod: PBBFAC | Performed by: INTERNAL MEDICINE

## 2021-05-04 PROCEDURE — 99999 PR PBB SHADOW E&M-EST. PATIENT-LVL III: CPT | Mod: PBBFAC,,, | Performed by: INTERNAL MEDICINE

## 2021-05-05 DIAGNOSIS — D50.0 IRON DEFICIENCY ANEMIA DUE TO CHRONIC BLOOD LOSS: ICD-10-CM

## 2021-05-05 DIAGNOSIS — D50.9 IRON DEFICIENCY ANEMIA, UNSPECIFIED IRON DEFICIENCY ANEMIA TYPE: ICD-10-CM

## 2021-05-05 RX ORDER — EPINEPHRINE 0.3 MG/.3ML
0.3 INJECTION SUBCUTANEOUS ONCE AS NEEDED
Status: CANCELLED | OUTPATIENT
Start: 2021-05-12

## 2021-05-05 RX ORDER — HEPARIN 100 UNIT/ML
500 SYRINGE INTRAVENOUS
Status: CANCELLED | OUTPATIENT
Start: 2021-05-12

## 2021-05-05 RX ORDER — SODIUM CHLORIDE 0.9 % (FLUSH) 0.9 %
10 SYRINGE (ML) INJECTION
Status: CANCELLED | OUTPATIENT
Start: 2021-05-12

## 2021-05-05 RX ORDER — SODIUM CHLORIDE 0.9 % (FLUSH) 0.9 %
10 SYRINGE (ML) INJECTION
Status: CANCELLED | OUTPATIENT
Start: 2021-05-19

## 2021-05-05 RX ORDER — METHYLPREDNISOLONE SOD SUCC 125 MG
125 VIAL (EA) INJECTION ONCE AS NEEDED
Status: CANCELLED | OUTPATIENT
Start: 2021-05-12

## 2021-05-05 RX ORDER — HEPARIN 100 UNIT/ML
500 SYRINGE INTRAVENOUS
Status: CANCELLED | OUTPATIENT
Start: 2021-05-19

## 2021-05-05 RX ORDER — DIPHENHYDRAMINE HYDROCHLORIDE 50 MG/ML
50 INJECTION INTRAMUSCULAR; INTRAVENOUS ONCE AS NEEDED
Status: CANCELLED | OUTPATIENT
Start: 2021-05-12

## 2021-05-14 ENCOUNTER — OFFICE VISIT (OUTPATIENT)
Dept: PAIN MEDICINE | Facility: CLINIC | Age: 56
End: 2021-05-14
Payer: MEDICAID

## 2021-05-14 ENCOUNTER — HOSPITAL ENCOUNTER (OUTPATIENT)
Dept: RADIOLOGY | Facility: HOSPITAL | Age: 56
Discharge: HOME | End: 2021-05-14
Attending: PHYSICIAN ASSISTANT
Payer: MEDICAID

## 2021-05-14 VITALS
BODY MASS INDEX: 34.82 KG/M2 | RESPIRATION RATE: 18 BRPM | HEIGHT: 65 IN | SYSTOLIC BLOOD PRESSURE: 138 MMHG | WEIGHT: 209 LBS | HEART RATE: 82 BPM | DIASTOLIC BLOOD PRESSURE: 84 MMHG

## 2021-05-14 DIAGNOSIS — M53.3 SACROILIAC JOINT DYSFUNCTION: Primary | ICD-10-CM

## 2021-05-14 DIAGNOSIS — G89.29 CHRONIC PAIN OF RIGHT KNEE: ICD-10-CM

## 2021-05-14 DIAGNOSIS — M25.561 CHRONIC PAIN OF RIGHT KNEE: ICD-10-CM

## 2021-05-14 DIAGNOSIS — M54.6 ACUTE MIDLINE THORACIC BACK PAIN: ICD-10-CM

## 2021-05-14 PROCEDURE — 73564 XR KNEE ORTHO BILAT WITH FLEXION: ICD-10-PCS | Mod: 26,50,, | Performed by: RADIOLOGY

## 2021-05-14 PROCEDURE — 73564 X-RAY EXAM KNEE 4 OR MORE: CPT | Mod: 26,50,, | Performed by: RADIOLOGY

## 2021-05-14 PROCEDURE — 99999 PR PBB SHADOW E&M-EST. PATIENT-LVL IV: ICD-10-PCS | Mod: PBBFAC,,, | Performed by: PHYSICIAN ASSISTANT

## 2021-05-14 PROCEDURE — 99999 PR PBB SHADOW E&M-EST. PATIENT-LVL IV: CPT | Mod: PBBFAC,,, | Performed by: PHYSICIAN ASSISTANT

## 2021-05-14 PROCEDURE — 99214 OFFICE O/P EST MOD 30 MIN: CPT | Mod: S$PBB,,, | Performed by: PHYSICIAN ASSISTANT

## 2021-05-14 PROCEDURE — 99214 PR OFFICE/OUTPT VISIT, EST, LEVL IV, 30-39 MIN: ICD-10-PCS | Mod: S$PBB,,, | Performed by: PHYSICIAN ASSISTANT

## 2021-05-14 PROCEDURE — 73564 X-RAY EXAM KNEE 4 OR MORE: CPT | Mod: TC,50

## 2021-05-14 PROCEDURE — 99214 OFFICE O/P EST MOD 30 MIN: CPT | Mod: PBBFAC,25 | Performed by: PHYSICIAN ASSISTANT

## 2021-05-19 ENCOUNTER — TELEPHONE (OUTPATIENT)
Dept: PULMONOLOGY | Facility: CLINIC | Age: 56
End: 2021-05-19

## 2021-05-19 ENCOUNTER — PATIENT MESSAGE (OUTPATIENT)
Dept: SLEEP MEDICINE | Facility: CLINIC | Age: 56
End: 2021-05-19

## 2021-05-20 ENCOUNTER — PATIENT MESSAGE (OUTPATIENT)
Dept: HEPATOLOGY | Facility: CLINIC | Age: 56
End: 2021-05-20

## 2021-05-20 ENCOUNTER — PATIENT MESSAGE (OUTPATIENT)
Dept: INTERNAL MEDICINE | Facility: CLINIC | Age: 56
End: 2021-05-20

## 2021-05-20 DIAGNOSIS — R60.0 BILATERAL LEG EDEMA: ICD-10-CM

## 2021-05-20 DIAGNOSIS — Z29.89 PROPHYLACTIC IMMUNOTHERAPY: ICD-10-CM

## 2021-05-20 RX ORDER — FUROSEMIDE 40 MG/1
40 TABLET ORAL DAILY
Qty: 30 TABLET | Refills: 2 | Status: CANCELLED | OUTPATIENT
Start: 2021-05-20

## 2021-05-21 RX ORDER — FUROSEMIDE 40 MG/1
40 TABLET ORAL DAILY
Qty: 90 TABLET | Refills: 1 | Status: SHIPPED | OUTPATIENT
Start: 2021-05-21 | End: 2021-11-17

## 2021-05-25 ENCOUNTER — PATIENT MESSAGE (OUTPATIENT)
Dept: PAIN MEDICINE | Facility: CLINIC | Age: 56
End: 2021-05-25

## 2021-05-25 ENCOUNTER — TELEPHONE (OUTPATIENT)
Dept: PAIN MEDICINE | Facility: CLINIC | Age: 56
End: 2021-05-25

## 2021-05-25 ENCOUNTER — TELEPHONE (OUTPATIENT)
Dept: HEMATOLOGY/ONCOLOGY | Facility: CLINIC | Age: 56
End: 2021-05-25

## 2021-05-25 ENCOUNTER — PATIENT MESSAGE (OUTPATIENT)
Dept: HEMATOLOGY/ONCOLOGY | Facility: CLINIC | Age: 56
End: 2021-05-25

## 2021-05-27 ENCOUNTER — CLINICAL SUPPORT (OUTPATIENT)
Dept: REHABILITATION | Facility: HOSPITAL | Age: 56
End: 2021-05-27
Payer: MEDICAID

## 2021-05-27 DIAGNOSIS — M25.562 PAIN IN BOTH KNEES, UNSPECIFIED CHRONICITY: ICD-10-CM

## 2021-05-27 DIAGNOSIS — R68.89 DECREASED STRENGTH, ENDURANCE, AND MOBILITY: ICD-10-CM

## 2021-05-27 DIAGNOSIS — M25.561 PAIN IN BOTH KNEES, UNSPECIFIED CHRONICITY: ICD-10-CM

## 2021-05-27 DIAGNOSIS — R53.1 DECREASED STRENGTH, ENDURANCE, AND MOBILITY: ICD-10-CM

## 2021-05-27 DIAGNOSIS — Z74.09 DECREASED STRENGTH, ENDURANCE, AND MOBILITY: ICD-10-CM

## 2021-05-27 DIAGNOSIS — M53.3 SACROILIAC JOINT DYSFUNCTION: ICD-10-CM

## 2021-05-27 DIAGNOSIS — M54.6 ACUTE MIDLINE THORACIC BACK PAIN: ICD-10-CM

## 2021-05-27 DIAGNOSIS — M25.561 CHRONIC PAIN OF RIGHT KNEE: ICD-10-CM

## 2021-05-27 DIAGNOSIS — G89.29 CHRONIC PAIN OF RIGHT KNEE: ICD-10-CM

## 2021-05-27 PROCEDURE — 97161 PT EVAL LOW COMPLEX 20 MIN: CPT | Performed by: PHYSICAL THERAPIST

## 2021-05-27 PROCEDURE — 97110 THERAPEUTIC EXERCISES: CPT | Performed by: PHYSICAL THERAPIST

## 2021-05-31 ENCOUNTER — INFUSION (OUTPATIENT)
Dept: INFUSION THERAPY | Facility: HOSPITAL | Age: 56
End: 2021-05-31
Attending: INTERNAL MEDICINE
Payer: MEDICAID

## 2021-05-31 VITALS
SYSTOLIC BLOOD PRESSURE: 118 MMHG | HEART RATE: 84 BPM | RESPIRATION RATE: 18 BRPM | BODY MASS INDEX: 34.78 KG/M2 | OXYGEN SATURATION: 98 % | TEMPERATURE: 98 F | DIASTOLIC BLOOD PRESSURE: 76 MMHG | WEIGHT: 209 LBS

## 2021-05-31 DIAGNOSIS — D50.0 IRON DEFICIENCY ANEMIA DUE TO CHRONIC BLOOD LOSS: Primary | ICD-10-CM

## 2021-05-31 PROBLEM — M25.561 KNEE PAIN, BILATERAL: Status: ACTIVE | Noted: 2021-05-31

## 2021-05-31 PROBLEM — M25.562 KNEE PAIN, BILATERAL: Status: ACTIVE | Noted: 2021-05-31

## 2021-05-31 PROBLEM — R53.1 DECREASED STRENGTH, ENDURANCE, AND MOBILITY: Status: ACTIVE | Noted: 2021-05-31

## 2021-05-31 PROBLEM — R68.89 DECREASED STRENGTH, ENDURANCE, AND MOBILITY: Status: ACTIVE | Noted: 2021-05-31

## 2021-05-31 PROBLEM — Z74.09 DECREASED STRENGTH, ENDURANCE, AND MOBILITY: Status: ACTIVE | Noted: 2021-05-31

## 2021-05-31 PROCEDURE — A4216 STERILE WATER/SALINE, 10 ML: HCPCS | Performed by: INTERNAL MEDICINE

## 2021-05-31 PROCEDURE — 63600175 PHARM REV CODE 636 W HCPCS: Performed by: INTERNAL MEDICINE

## 2021-05-31 PROCEDURE — 25000003 PHARM REV CODE 250: Performed by: INTERNAL MEDICINE

## 2021-05-31 PROCEDURE — 96365 THER/PROPH/DIAG IV INF INIT: CPT

## 2021-05-31 RX ORDER — EPINEPHRINE 0.3 MG/.3ML
0.3 INJECTION SUBCUTANEOUS ONCE AS NEEDED
Status: CANCELLED | OUTPATIENT
Start: 2021-06-07

## 2021-05-31 RX ORDER — HEPARIN 100 UNIT/ML
500 SYRINGE INTRAVENOUS
Status: CANCELLED | OUTPATIENT
Start: 2021-06-07

## 2021-05-31 RX ORDER — METHYLPREDNISOLONE SOD SUCC 125 MG
125 VIAL (EA) INJECTION ONCE AS NEEDED
Status: CANCELLED | OUTPATIENT
Start: 2021-06-07

## 2021-05-31 RX ORDER — SODIUM CHLORIDE 0.9 % (FLUSH) 0.9 %
10 SYRINGE (ML) INJECTION
Status: CANCELLED | OUTPATIENT
Start: 2021-06-07

## 2021-05-31 RX ORDER — SODIUM CHLORIDE 0.9 % (FLUSH) 0.9 %
10 SYRINGE (ML) INJECTION
Status: DISCONTINUED | OUTPATIENT
Start: 2021-05-31 | End: 2021-05-31 | Stop reason: HOSPADM

## 2021-05-31 RX ORDER — DIPHENHYDRAMINE HYDROCHLORIDE 50 MG/ML
50 INJECTION INTRAMUSCULAR; INTRAVENOUS ONCE AS NEEDED
Status: CANCELLED | OUTPATIENT
Start: 2021-06-07

## 2021-05-31 RX ADMIN — SODIUM CHLORIDE 125 MG: 9 INJECTION, SOLUTION INTRAVENOUS at 01:05

## 2021-05-31 RX ADMIN — SODIUM CHLORIDE, PRESERVATIVE FREE 10 ML: 5 INJECTION INTRAVENOUS at 01:05

## 2021-06-04 ENCOUNTER — CLINICAL SUPPORT (OUTPATIENT)
Dept: REHABILITATION | Facility: HOSPITAL | Age: 56
End: 2021-06-04
Payer: MEDICAID

## 2021-06-04 DIAGNOSIS — M25.562 ACUTE PAIN OF BOTH KNEES: ICD-10-CM

## 2021-06-04 DIAGNOSIS — R68.89 DECREASED STRENGTH, ENDURANCE, AND MOBILITY: ICD-10-CM

## 2021-06-04 DIAGNOSIS — M25.561 ACUTE PAIN OF BOTH KNEES: ICD-10-CM

## 2021-06-04 DIAGNOSIS — R53.1 DECREASED STRENGTH, ENDURANCE, AND MOBILITY: ICD-10-CM

## 2021-06-04 DIAGNOSIS — Z74.09 DECREASED STRENGTH, ENDURANCE, AND MOBILITY: ICD-10-CM

## 2021-06-04 PROCEDURE — 97110 THERAPEUTIC EXERCISES: CPT | Mod: CQ

## 2021-06-07 ENCOUNTER — INFUSION (OUTPATIENT)
Dept: INFUSION THERAPY | Facility: HOSPITAL | Age: 56
End: 2021-06-07
Attending: INTERNAL MEDICINE
Payer: MEDICAID

## 2021-06-07 VITALS
WEIGHT: 210.56 LBS | TEMPERATURE: 98 F | HEIGHT: 65 IN | RESPIRATION RATE: 18 BRPM | HEART RATE: 90 BPM | SYSTOLIC BLOOD PRESSURE: 121 MMHG | BODY MASS INDEX: 35.08 KG/M2 | OXYGEN SATURATION: 100 % | DIASTOLIC BLOOD PRESSURE: 78 MMHG

## 2021-06-07 DIAGNOSIS — D50.0 IRON DEFICIENCY ANEMIA DUE TO CHRONIC BLOOD LOSS: Primary | ICD-10-CM

## 2021-06-07 PROCEDURE — A4216 STERILE WATER/SALINE, 10 ML: HCPCS | Performed by: INTERNAL MEDICINE

## 2021-06-07 PROCEDURE — 63600175 PHARM REV CODE 636 W HCPCS: Performed by: INTERNAL MEDICINE

## 2021-06-07 PROCEDURE — 96365 THER/PROPH/DIAG IV INF INIT: CPT

## 2021-06-07 PROCEDURE — 25000003 PHARM REV CODE 250: Performed by: INTERNAL MEDICINE

## 2021-06-07 RX ORDER — DIPHENHYDRAMINE HYDROCHLORIDE 50 MG/ML
50 INJECTION INTRAMUSCULAR; INTRAVENOUS ONCE AS NEEDED
Status: CANCELLED | OUTPATIENT
Start: 2021-06-14

## 2021-06-07 RX ORDER — HEPARIN 100 UNIT/ML
500 SYRINGE INTRAVENOUS
Status: CANCELLED | OUTPATIENT
Start: 2021-06-14

## 2021-06-07 RX ORDER — SODIUM CHLORIDE 0.9 % (FLUSH) 0.9 %
10 SYRINGE (ML) INJECTION
Status: CANCELLED | OUTPATIENT
Start: 2021-06-14

## 2021-06-07 RX ORDER — SODIUM CHLORIDE 0.9 % (FLUSH) 0.9 %
10 SYRINGE (ML) INJECTION
Status: DISCONTINUED | OUTPATIENT
Start: 2021-06-07 | End: 2021-06-07 | Stop reason: HOSPADM

## 2021-06-07 RX ORDER — METHYLPREDNISOLONE SOD SUCC 125 MG
125 VIAL (EA) INJECTION ONCE AS NEEDED
Status: CANCELLED | OUTPATIENT
Start: 2021-06-14

## 2021-06-07 RX ORDER — EPINEPHRINE 0.3 MG/.3ML
0.3 INJECTION SUBCUTANEOUS ONCE AS NEEDED
Status: CANCELLED | OUTPATIENT
Start: 2021-06-14

## 2021-06-07 RX ADMIN — SODIUM CHLORIDE 125 MG: 9 INJECTION, SOLUTION INTRAVENOUS at 01:06

## 2021-06-07 RX ADMIN — Medication 10 ML: at 01:06

## 2021-06-14 ENCOUNTER — INFUSION (OUTPATIENT)
Dept: INFUSION THERAPY | Facility: HOSPITAL | Age: 56
End: 2021-06-14
Attending: INTERNAL MEDICINE
Payer: MEDICAID

## 2021-06-14 VITALS
DIASTOLIC BLOOD PRESSURE: 86 MMHG | HEART RATE: 80 BPM | OXYGEN SATURATION: 99 % | SYSTOLIC BLOOD PRESSURE: 125 MMHG | RESPIRATION RATE: 18 BRPM | TEMPERATURE: 98 F

## 2021-06-14 DIAGNOSIS — D50.0 IRON DEFICIENCY ANEMIA DUE TO CHRONIC BLOOD LOSS: Primary | ICD-10-CM

## 2021-06-14 PROCEDURE — 63600175 PHARM REV CODE 636 W HCPCS: Performed by: INTERNAL MEDICINE

## 2021-06-14 PROCEDURE — 25000003 PHARM REV CODE 250: Performed by: INTERNAL MEDICINE

## 2021-06-14 PROCEDURE — 96365 THER/PROPH/DIAG IV INF INIT: CPT

## 2021-06-14 RX ORDER — DIPHENHYDRAMINE HYDROCHLORIDE 50 MG/ML
50 INJECTION INTRAMUSCULAR; INTRAVENOUS ONCE AS NEEDED
Status: CANCELLED | OUTPATIENT
Start: 2021-06-21

## 2021-06-14 RX ORDER — SODIUM CHLORIDE 0.9 % (FLUSH) 0.9 %
10 SYRINGE (ML) INJECTION
Status: CANCELLED | OUTPATIENT
Start: 2021-06-21

## 2021-06-14 RX ORDER — EPINEPHRINE 0.3 MG/.3ML
0.3 INJECTION SUBCUTANEOUS ONCE AS NEEDED
Status: CANCELLED | OUTPATIENT
Start: 2021-06-21

## 2021-06-14 RX ORDER — HEPARIN 100 UNIT/ML
500 SYRINGE INTRAVENOUS
Status: CANCELLED | OUTPATIENT
Start: 2021-06-21

## 2021-06-14 RX ORDER — METHYLPREDNISOLONE SOD SUCC 125 MG
125 VIAL (EA) INJECTION ONCE AS NEEDED
Status: CANCELLED | OUTPATIENT
Start: 2021-06-21

## 2021-06-14 RX ADMIN — SODIUM CHLORIDE 125 MG: 9 INJECTION, SOLUTION INTRAVENOUS at 01:06

## 2021-06-17 ENCOUNTER — CLINICAL SUPPORT (OUTPATIENT)
Dept: REHABILITATION | Facility: HOSPITAL | Age: 56
End: 2021-06-17
Payer: MEDICAID

## 2021-06-17 DIAGNOSIS — Z74.09 DECREASED STRENGTH, ENDURANCE, AND MOBILITY: ICD-10-CM

## 2021-06-17 DIAGNOSIS — M25.562 ACUTE PAIN OF BOTH KNEES: ICD-10-CM

## 2021-06-17 DIAGNOSIS — M25.561 ACUTE PAIN OF BOTH KNEES: ICD-10-CM

## 2021-06-17 DIAGNOSIS — R53.1 DECREASED STRENGTH, ENDURANCE, AND MOBILITY: ICD-10-CM

## 2021-06-17 DIAGNOSIS — R68.89 DECREASED STRENGTH, ENDURANCE, AND MOBILITY: ICD-10-CM

## 2021-06-17 PROCEDURE — 97110 THERAPEUTIC EXERCISES: CPT | Performed by: PHYSICAL THERAPIST

## 2021-06-21 ENCOUNTER — INFUSION (OUTPATIENT)
Dept: INFUSION THERAPY | Facility: HOSPITAL | Age: 56
End: 2021-06-21
Attending: INTERNAL MEDICINE
Payer: MEDICAID

## 2021-06-21 VITALS
HEART RATE: 85 BPM | DIASTOLIC BLOOD PRESSURE: 76 MMHG | SYSTOLIC BLOOD PRESSURE: 125 MMHG | OXYGEN SATURATION: 96 % | RESPIRATION RATE: 16 BRPM | TEMPERATURE: 98 F

## 2021-06-21 DIAGNOSIS — D50.0 IRON DEFICIENCY ANEMIA DUE TO CHRONIC BLOOD LOSS: Primary | ICD-10-CM

## 2021-06-21 PROCEDURE — 96365 THER/PROPH/DIAG IV INF INIT: CPT

## 2021-06-21 PROCEDURE — 63600175 PHARM REV CODE 636 W HCPCS: Performed by: INTERNAL MEDICINE

## 2021-06-21 PROCEDURE — 25000003 PHARM REV CODE 250: Performed by: INTERNAL MEDICINE

## 2021-06-21 RX ORDER — EPINEPHRINE 0.3 MG/.3ML
0.3 INJECTION SUBCUTANEOUS ONCE AS NEEDED
Status: CANCELLED | OUTPATIENT
Start: 2021-06-28

## 2021-06-21 RX ORDER — DIPHENHYDRAMINE HYDROCHLORIDE 50 MG/ML
50 INJECTION INTRAMUSCULAR; INTRAVENOUS ONCE AS NEEDED
Status: CANCELLED | OUTPATIENT
Start: 2021-06-28

## 2021-06-21 RX ORDER — SODIUM CHLORIDE 0.9 % (FLUSH) 0.9 %
10 SYRINGE (ML) INJECTION
Status: CANCELLED | OUTPATIENT
Start: 2021-06-28

## 2021-06-21 RX ORDER — METHYLPREDNISOLONE SOD SUCC 125 MG
125 VIAL (EA) INJECTION ONCE AS NEEDED
Status: CANCELLED | OUTPATIENT
Start: 2021-06-28

## 2021-06-21 RX ORDER — HEPARIN 100 UNIT/ML
500 SYRINGE INTRAVENOUS
Status: CANCELLED | OUTPATIENT
Start: 2021-06-28

## 2021-06-21 RX ADMIN — SODIUM CHLORIDE 125 MG: 9 INJECTION, SOLUTION INTRAVENOUS at 01:06

## 2021-06-24 ENCOUNTER — TELEPHONE (OUTPATIENT)
Dept: REHABILITATION | Facility: HOSPITAL | Age: 56
End: 2021-06-24

## 2021-07-01 ENCOUNTER — INFUSION (OUTPATIENT)
Dept: INFUSION THERAPY | Facility: HOSPITAL | Age: 56
End: 2021-07-01
Attending: INTERNAL MEDICINE
Payer: MEDICAID

## 2021-07-01 ENCOUNTER — CLINICAL SUPPORT (OUTPATIENT)
Dept: REHABILITATION | Facility: HOSPITAL | Age: 56
End: 2021-07-01
Payer: MEDICAID

## 2021-07-01 VITALS
HEART RATE: 96 BPM | OXYGEN SATURATION: 98 % | DIASTOLIC BLOOD PRESSURE: 81 MMHG | RESPIRATION RATE: 18 BRPM | TEMPERATURE: 98 F | SYSTOLIC BLOOD PRESSURE: 119 MMHG

## 2021-07-01 DIAGNOSIS — M25.562 ACUTE PAIN OF BOTH KNEES: ICD-10-CM

## 2021-07-01 DIAGNOSIS — Z74.09 DECREASED STRENGTH, ENDURANCE, AND MOBILITY: ICD-10-CM

## 2021-07-01 DIAGNOSIS — D50.0 IRON DEFICIENCY ANEMIA DUE TO CHRONIC BLOOD LOSS: Primary | ICD-10-CM

## 2021-07-01 DIAGNOSIS — R53.1 DECREASED STRENGTH, ENDURANCE, AND MOBILITY: ICD-10-CM

## 2021-07-01 DIAGNOSIS — M25.561 ACUTE PAIN OF BOTH KNEES: ICD-10-CM

## 2021-07-01 DIAGNOSIS — R68.89 DECREASED STRENGTH, ENDURANCE, AND MOBILITY: ICD-10-CM

## 2021-07-01 PROCEDURE — 63600175 PHARM REV CODE 636 W HCPCS: Performed by: INTERNAL MEDICINE

## 2021-07-01 PROCEDURE — 97110 THERAPEUTIC EXERCISES: CPT | Performed by: PHYSICAL THERAPIST

## 2021-07-01 PROCEDURE — 96365 THER/PROPH/DIAG IV INF INIT: CPT

## 2021-07-01 PROCEDURE — 25000003 PHARM REV CODE 250: Performed by: INTERNAL MEDICINE

## 2021-07-01 RX ORDER — EPINEPHRINE 0.3 MG/.3ML
0.3 INJECTION SUBCUTANEOUS ONCE AS NEEDED
Status: CANCELLED | OUTPATIENT
Start: 2021-07-05

## 2021-07-01 RX ORDER — METHYLPREDNISOLONE SOD SUCC 125 MG
125 VIAL (EA) INJECTION ONCE AS NEEDED
Status: CANCELLED | OUTPATIENT
Start: 2021-07-05

## 2021-07-01 RX ORDER — SODIUM CHLORIDE 0.9 % (FLUSH) 0.9 %
10 SYRINGE (ML) INJECTION
Status: CANCELLED | OUTPATIENT
Start: 2021-07-05

## 2021-07-01 RX ORDER — HEPARIN 100 UNIT/ML
500 SYRINGE INTRAVENOUS
Status: CANCELLED | OUTPATIENT
Start: 2021-07-05

## 2021-07-01 RX ORDER — DIPHENHYDRAMINE HYDROCHLORIDE 50 MG/ML
50 INJECTION INTRAMUSCULAR; INTRAVENOUS ONCE AS NEEDED
Status: CANCELLED | OUTPATIENT
Start: 2021-07-05

## 2021-07-01 RX ADMIN — SODIUM CHLORIDE 125 MG: 9 INJECTION, SOLUTION INTRAVENOUS at 01:07

## 2021-07-06 ENCOUNTER — PATIENT MESSAGE (OUTPATIENT)
Dept: PAIN MEDICINE | Facility: CLINIC | Age: 56
End: 2021-07-06

## 2021-07-06 ENCOUNTER — TELEPHONE (OUTPATIENT)
Dept: INTERNAL MEDICINE | Facility: CLINIC | Age: 56
End: 2021-07-06

## 2021-07-07 RX ORDER — CYCLOBENZAPRINE HCL 10 MG
10 TABLET ORAL 2 TIMES DAILY PRN
Qty: 60 TABLET | Refills: 0 | Status: SHIPPED | OUTPATIENT
Start: 2021-07-07 | End: 2021-08-06 | Stop reason: SDUPTHER

## 2021-07-08 ENCOUNTER — INFUSION (OUTPATIENT)
Dept: INFUSION THERAPY | Facility: HOSPITAL | Age: 56
End: 2021-07-08
Attending: INTERNAL MEDICINE
Payer: MEDICAID

## 2021-07-08 VITALS
OXYGEN SATURATION: 98 % | HEIGHT: 65 IN | SYSTOLIC BLOOD PRESSURE: 136 MMHG | BODY MASS INDEX: 34.71 KG/M2 | WEIGHT: 208.31 LBS | HEART RATE: 92 BPM | RESPIRATION RATE: 18 BRPM | TEMPERATURE: 98 F | DIASTOLIC BLOOD PRESSURE: 82 MMHG

## 2021-07-08 DIAGNOSIS — Z94.4 LIVER REPLACED BY TRANSPLANT: ICD-10-CM

## 2021-07-08 DIAGNOSIS — D50.0 IRON DEFICIENCY ANEMIA DUE TO CHRONIC BLOOD LOSS: Primary | ICD-10-CM

## 2021-07-08 PROCEDURE — 96365 THER/PROPH/DIAG IV INF INIT: CPT

## 2021-07-08 PROCEDURE — 25000003 PHARM REV CODE 250: Performed by: INTERNAL MEDICINE

## 2021-07-08 PROCEDURE — A4216 STERILE WATER/SALINE, 10 ML: HCPCS | Performed by: INTERNAL MEDICINE

## 2021-07-08 PROCEDURE — 63600175 PHARM REV CODE 636 W HCPCS: Performed by: INTERNAL MEDICINE

## 2021-07-08 RX ORDER — SODIUM CHLORIDE 0.9 % (FLUSH) 0.9 %
10 SYRINGE (ML) INJECTION
Status: DISCONTINUED | OUTPATIENT
Start: 2021-07-08 | End: 2021-07-08 | Stop reason: HOSPADM

## 2021-07-08 RX ORDER — SODIUM CHLORIDE 0.9 % (FLUSH) 0.9 %
10 SYRINGE (ML) INJECTION
Status: CANCELLED | OUTPATIENT
Start: 2021-07-12

## 2021-07-08 RX ORDER — METHYLPREDNISOLONE SOD SUCC 125 MG
125 VIAL (EA) INJECTION ONCE AS NEEDED
Status: CANCELLED | OUTPATIENT
Start: 2021-07-12

## 2021-07-08 RX ORDER — HEPARIN 100 UNIT/ML
500 SYRINGE INTRAVENOUS
Status: CANCELLED | OUTPATIENT
Start: 2021-07-12

## 2021-07-08 RX ORDER — DIPHENHYDRAMINE HYDROCHLORIDE 50 MG/ML
50 INJECTION INTRAMUSCULAR; INTRAVENOUS ONCE AS NEEDED
Status: CANCELLED | OUTPATIENT
Start: 2021-07-12

## 2021-07-08 RX ORDER — EPINEPHRINE 0.3 MG/.3ML
0.3 INJECTION SUBCUTANEOUS ONCE AS NEEDED
Status: CANCELLED | OUTPATIENT
Start: 2021-07-12

## 2021-07-08 RX ADMIN — Medication 10 ML: at 02:07

## 2021-07-08 RX ADMIN — SODIUM CHLORIDE 125 MG: 9 INJECTION, SOLUTION INTRAVENOUS at 02:07

## 2021-07-09 ENCOUNTER — CLINICAL SUPPORT (OUTPATIENT)
Dept: REHABILITATION | Facility: HOSPITAL | Age: 56
End: 2021-07-09
Payer: MEDICAID

## 2021-07-09 DIAGNOSIS — M25.562 ACUTE PAIN OF BOTH KNEES: ICD-10-CM

## 2021-07-09 DIAGNOSIS — R68.89 DECREASED STRENGTH, ENDURANCE, AND MOBILITY: ICD-10-CM

## 2021-07-09 DIAGNOSIS — Z74.09 DECREASED STRENGTH, ENDURANCE, AND MOBILITY: ICD-10-CM

## 2021-07-09 DIAGNOSIS — M25.561 ACUTE PAIN OF BOTH KNEES: ICD-10-CM

## 2021-07-09 DIAGNOSIS — R53.1 DECREASED STRENGTH, ENDURANCE, AND MOBILITY: ICD-10-CM

## 2021-07-09 PROCEDURE — 97110 THERAPEUTIC EXERCISES: CPT | Performed by: PHYSICAL THERAPIST

## 2021-07-09 RX ORDER — TACROLIMUS 1 MG/1
CAPSULE ORAL
Qty: 90 CAPSULE | Refills: 11 | Status: SHIPPED | OUTPATIENT
Start: 2021-07-09 | End: 2022-07-20

## 2021-07-12 ENCOUNTER — CLINICAL SUPPORT (OUTPATIENT)
Dept: REHABILITATION | Facility: HOSPITAL | Age: 56
End: 2021-07-12
Payer: MEDICAID

## 2021-07-12 ENCOUNTER — LAB VISIT (OUTPATIENT)
Dept: LAB | Facility: HOSPITAL | Age: 56
End: 2021-07-12
Attending: INTERNAL MEDICINE
Payer: MEDICAID

## 2021-07-12 DIAGNOSIS — Z74.09 DECREASED STRENGTH, ENDURANCE, AND MOBILITY: ICD-10-CM

## 2021-07-12 DIAGNOSIS — R68.89 DECREASED STRENGTH, ENDURANCE, AND MOBILITY: ICD-10-CM

## 2021-07-12 DIAGNOSIS — M25.562 ACUTE PAIN OF BOTH KNEES: ICD-10-CM

## 2021-07-12 DIAGNOSIS — Z94.4 LIVER REPLACED BY TRANSPLANT: ICD-10-CM

## 2021-07-12 DIAGNOSIS — M25.561 ACUTE PAIN OF BOTH KNEES: ICD-10-CM

## 2021-07-12 DIAGNOSIS — R53.1 DECREASED STRENGTH, ENDURANCE, AND MOBILITY: ICD-10-CM

## 2021-07-12 LAB
ALBUMIN SERPL BCP-MCNC: 3.5 G/DL (ref 3.5–5.2)
ALP SERPL-CCNC: 181 U/L (ref 55–135)
ALT SERPL W/O P-5'-P-CCNC: 15 U/L (ref 10–44)
ANION GAP SERPL CALC-SCNC: 8 MMOL/L (ref 8–16)
AST SERPL-CCNC: 14 U/L (ref 10–40)
BASOPHILS # BLD AUTO: 0.03 K/UL (ref 0–0.2)
BASOPHILS NFR BLD: 0.3 % (ref 0–1.9)
BILIRUB SERPL-MCNC: 0.4 MG/DL (ref 0.1–1)
BUN SERPL-MCNC: 10 MG/DL (ref 6–20)
CALCIUM SERPL-MCNC: 9.6 MG/DL (ref 8.7–10.5)
CHLORIDE SERPL-SCNC: 104 MMOL/L (ref 95–110)
CO2 SERPL-SCNC: 27 MMOL/L (ref 23–29)
CREAT SERPL-MCNC: 0.9 MG/DL (ref 0.5–1.4)
DIFFERENTIAL METHOD: ABNORMAL
EOSINOPHIL # BLD AUTO: 0.1 K/UL (ref 0–0.5)
EOSINOPHIL NFR BLD: 0.9 % (ref 0–8)
ERYTHROCYTE [DISTWIDTH] IN BLOOD BY AUTOMATED COUNT: 18.5 % (ref 11.5–14.5)
EST. GFR  (AFRICAN AMERICAN): >60 ML/MIN/1.73 M^2
EST. GFR  (NON AFRICAN AMERICAN): >60 ML/MIN/1.73 M^2
GLUCOSE SERPL-MCNC: 102 MG/DL (ref 70–110)
HCT VFR BLD AUTO: 40.3 % (ref 37–48.5)
HGB BLD-MCNC: 12.5 G/DL (ref 12–16)
IMM GRANULOCYTES # BLD AUTO: 0.03 K/UL (ref 0–0.04)
IMM GRANULOCYTES NFR BLD AUTO: 0.3 % (ref 0–0.5)
LYMPHOCYTES # BLD AUTO: 2.4 K/UL (ref 1–4.8)
LYMPHOCYTES NFR BLD: 22.9 % (ref 18–48)
MAGNESIUM SERPL-MCNC: 1.5 MG/DL (ref 1.6–2.6)
MCH RBC QN AUTO: 24.7 PG (ref 27–31)
MCHC RBC AUTO-ENTMCNC: 31 G/DL (ref 32–36)
MCV RBC AUTO: 80 FL (ref 82–98)
MONOCYTES # BLD AUTO: 0.5 K/UL (ref 0.3–1)
MONOCYTES NFR BLD: 5.1 % (ref 4–15)
NEUTROPHILS # BLD AUTO: 7.4 K/UL (ref 1.8–7.7)
NEUTROPHILS NFR BLD: 70.5 % (ref 38–73)
NRBC BLD-RTO: 0 /100 WBC
PLATELET # BLD AUTO: 75 K/UL (ref 150–450)
PMV BLD AUTO: 10.9 FL (ref 9.2–12.9)
POTASSIUM SERPL-SCNC: 4.3 MMOL/L (ref 3.5–5.1)
PROT SERPL-MCNC: 7.3 G/DL (ref 6–8.4)
RBC # BLD AUTO: 5.07 M/UL (ref 4–5.4)
SODIUM SERPL-SCNC: 139 MMOL/L (ref 136–145)
WBC # BLD AUTO: 10.47 K/UL (ref 3.9–12.7)

## 2021-07-12 PROCEDURE — 97110 THERAPEUTIC EXERCISES: CPT | Performed by: PHYSICAL THERAPIST

## 2021-07-12 PROCEDURE — 83735 ASSAY OF MAGNESIUM: CPT | Performed by: INTERNAL MEDICINE

## 2021-07-12 PROCEDURE — 36415 COLL VENOUS BLD VENIPUNCTURE: CPT | Performed by: INTERNAL MEDICINE

## 2021-07-12 PROCEDURE — 80053 COMPREHEN METABOLIC PANEL: CPT | Performed by: INTERNAL MEDICINE

## 2021-07-12 PROCEDURE — 85025 COMPLETE CBC W/AUTO DIFF WBC: CPT | Performed by: INTERNAL MEDICINE

## 2021-07-12 PROCEDURE — 80197 ASSAY OF TACROLIMUS: CPT | Performed by: INTERNAL MEDICINE

## 2021-07-13 LAB
TACROLIMUS BLD-MCNC: 6.7 NG/ML (ref 5–15)
TACROLIMUS, NORMALIZED: 6 NG/ML (ref 5–15)

## 2021-07-14 ENCOUNTER — OFFICE VISIT (OUTPATIENT)
Dept: SLEEP MEDICINE | Facility: CLINIC | Age: 56
End: 2021-07-14
Payer: MEDICAID

## 2021-07-14 VITALS
WEIGHT: 210.75 LBS | DIASTOLIC BLOOD PRESSURE: 78 MMHG | SYSTOLIC BLOOD PRESSURE: 116 MMHG | BODY MASS INDEX: 35.11 KG/M2 | RESPIRATION RATE: 18 BRPM | HEART RATE: 100 BPM | OXYGEN SATURATION: 97 % | HEIGHT: 65 IN

## 2021-07-14 DIAGNOSIS — J30.1 SEASONAL ALLERGIC RHINITIS DUE TO POLLEN: ICD-10-CM

## 2021-07-14 DIAGNOSIS — E66.9 OBESITY, CLASS I, BMI 30-34.9: ICD-10-CM

## 2021-07-14 DIAGNOSIS — G47.33 OSA (OBSTRUCTIVE SLEEP APNEA): Primary | ICD-10-CM

## 2021-07-14 PROCEDURE — 99214 OFFICE O/P EST MOD 30 MIN: CPT | Mod: PBBFAC | Performed by: NURSE PRACTITIONER

## 2021-07-14 PROCEDURE — 99214 OFFICE O/P EST MOD 30 MIN: CPT | Mod: S$PBB,,, | Performed by: NURSE PRACTITIONER

## 2021-07-14 PROCEDURE — 99214 PR OFFICE/OUTPT VISIT, EST, LEVL IV, 30-39 MIN: ICD-10-PCS | Mod: S$PBB,,, | Performed by: NURSE PRACTITIONER

## 2021-07-14 PROCEDURE — 99999 PR PBB SHADOW E&M-EST. PATIENT-LVL IV: CPT | Mod: PBBFAC,,, | Performed by: NURSE PRACTITIONER

## 2021-07-14 PROCEDURE — 99999 PR PBB SHADOW E&M-EST. PATIENT-LVL IV: ICD-10-PCS | Mod: PBBFAC,,, | Performed by: NURSE PRACTITIONER

## 2021-07-15 ENCOUNTER — TELEPHONE (OUTPATIENT)
Dept: TRANSPLANT | Facility: CLINIC | Age: 56
End: 2021-07-15

## 2021-07-15 ENCOUNTER — INFUSION (OUTPATIENT)
Dept: INFUSION THERAPY | Facility: HOSPITAL | Age: 56
End: 2021-07-15
Attending: INTERNAL MEDICINE
Payer: MEDICAID

## 2021-07-15 VITALS
SYSTOLIC BLOOD PRESSURE: 119 MMHG | OXYGEN SATURATION: 97 % | DIASTOLIC BLOOD PRESSURE: 78 MMHG | RESPIRATION RATE: 16 BRPM | TEMPERATURE: 98 F | HEART RATE: 90 BPM

## 2021-07-15 DIAGNOSIS — D50.0 IRON DEFICIENCY ANEMIA DUE TO CHRONIC BLOOD LOSS: Primary | ICD-10-CM

## 2021-07-15 PROCEDURE — 25000003 PHARM REV CODE 250: Performed by: INTERNAL MEDICINE

## 2021-07-15 PROCEDURE — 96365 THER/PROPH/DIAG IV INF INIT: CPT

## 2021-07-15 PROCEDURE — 63600175 PHARM REV CODE 636 W HCPCS: Performed by: INTERNAL MEDICINE

## 2021-07-15 RX ORDER — METHYLPREDNISOLONE SOD SUCC 125 MG
125 VIAL (EA) INJECTION ONCE AS NEEDED
Status: CANCELLED | OUTPATIENT
Start: 2021-07-22

## 2021-07-15 RX ORDER — EPINEPHRINE 0.3 MG/.3ML
0.3 INJECTION SUBCUTANEOUS ONCE AS NEEDED
Status: CANCELLED | OUTPATIENT
Start: 2021-07-22

## 2021-07-15 RX ORDER — SODIUM CHLORIDE 0.9 % (FLUSH) 0.9 %
10 SYRINGE (ML) INJECTION
Status: CANCELLED | OUTPATIENT
Start: 2021-07-22

## 2021-07-15 RX ORDER — DIPHENHYDRAMINE HYDROCHLORIDE 50 MG/ML
50 INJECTION INTRAMUSCULAR; INTRAVENOUS ONCE AS NEEDED
Status: CANCELLED | OUTPATIENT
Start: 2021-07-22

## 2021-07-15 RX ORDER — HEPARIN 100 UNIT/ML
500 SYRINGE INTRAVENOUS
Status: CANCELLED | OUTPATIENT
Start: 2021-07-22

## 2021-07-15 RX ADMIN — SODIUM CHLORIDE 125 MG: 9 INJECTION, SOLUTION INTRAVENOUS at 01:07

## 2021-07-19 ENCOUNTER — TELEPHONE (OUTPATIENT)
Dept: INTERNAL MEDICINE | Facility: CLINIC | Age: 56
End: 2021-07-19

## 2021-07-20 ENCOUNTER — TELEPHONE (OUTPATIENT)
Dept: INTERNAL MEDICINE | Facility: CLINIC | Age: 56
End: 2021-07-20

## 2021-07-22 ENCOUNTER — PATIENT MESSAGE (OUTPATIENT)
Dept: INTERNAL MEDICINE | Facility: CLINIC | Age: 56
End: 2021-07-22

## 2021-07-22 ENCOUNTER — CLINICAL SUPPORT (OUTPATIENT)
Dept: INTERNAL MEDICINE | Facility: CLINIC | Age: 56
End: 2021-07-22
Payer: MEDICAID

## 2021-07-22 ENCOUNTER — TELEPHONE (OUTPATIENT)
Dept: INTERNAL MEDICINE | Facility: CLINIC | Age: 56
End: 2021-07-22

## 2021-07-22 ENCOUNTER — CLINICAL SUPPORT (OUTPATIENT)
Dept: REHABILITATION | Facility: HOSPITAL | Age: 56
End: 2021-07-22
Payer: MEDICAID

## 2021-07-22 VITALS — DIASTOLIC BLOOD PRESSURE: 86 MMHG | SYSTOLIC BLOOD PRESSURE: 118 MMHG

## 2021-07-22 DIAGNOSIS — Z01.419 VISIT FOR GYNECOLOGIC EXAMINATION: Primary | ICD-10-CM

## 2021-07-22 DIAGNOSIS — M25.562 ACUTE PAIN OF BOTH KNEES: ICD-10-CM

## 2021-07-22 DIAGNOSIS — Z74.09 DECREASED STRENGTH, ENDURANCE, AND MOBILITY: ICD-10-CM

## 2021-07-22 DIAGNOSIS — R53.1 DECREASED STRENGTH, ENDURANCE, AND MOBILITY: ICD-10-CM

## 2021-07-22 DIAGNOSIS — R68.89 DECREASED STRENGTH, ENDURANCE, AND MOBILITY: ICD-10-CM

## 2021-07-22 DIAGNOSIS — M25.561 ACUTE PAIN OF BOTH KNEES: ICD-10-CM

## 2021-07-22 DIAGNOSIS — Z12.31 ENCOUNTER FOR SCREENING MAMMOGRAM FOR BREAST CANCER: Primary | ICD-10-CM

## 2021-07-22 PROCEDURE — 99999 PR PBB SHADOW E&M-EST. PATIENT-LVL III: ICD-10-PCS | Mod: PBBFAC,,,

## 2021-07-22 PROCEDURE — 99213 OFFICE O/P EST LOW 20 MIN: CPT | Mod: PBBFAC

## 2021-07-22 PROCEDURE — 99999 PR PBB SHADOW E&M-EST. PATIENT-LVL III: CPT | Mod: PBBFAC,,,

## 2021-07-22 PROCEDURE — 97110 THERAPEUTIC EXERCISES: CPT | Performed by: PHYSICAL THERAPIST

## 2021-07-26 ENCOUNTER — INFUSION (OUTPATIENT)
Dept: INFUSION THERAPY | Facility: HOSPITAL | Age: 56
End: 2021-07-26
Attending: INTERNAL MEDICINE
Payer: MEDICAID

## 2021-07-26 VITALS
DIASTOLIC BLOOD PRESSURE: 72 MMHG | SYSTOLIC BLOOD PRESSURE: 124 MMHG | RESPIRATION RATE: 16 BRPM | HEART RATE: 77 BPM | TEMPERATURE: 97 F | OXYGEN SATURATION: 100 %

## 2021-07-26 DIAGNOSIS — D50.0 IRON DEFICIENCY ANEMIA DUE TO CHRONIC BLOOD LOSS: Primary | ICD-10-CM

## 2021-07-26 PROCEDURE — 25000003 PHARM REV CODE 250: Performed by: INTERNAL MEDICINE

## 2021-07-26 PROCEDURE — 96365 THER/PROPH/DIAG IV INF INIT: CPT

## 2021-07-26 PROCEDURE — 63600175 PHARM REV CODE 636 W HCPCS: Performed by: INTERNAL MEDICINE

## 2021-07-26 RX ORDER — HEPARIN 100 UNIT/ML
500 SYRINGE INTRAVENOUS
Status: CANCELLED | OUTPATIENT
Start: 2021-07-29

## 2021-07-26 RX ORDER — DIPHENHYDRAMINE HYDROCHLORIDE 50 MG/ML
50 INJECTION INTRAMUSCULAR; INTRAVENOUS ONCE AS NEEDED
Status: CANCELLED | OUTPATIENT
Start: 2021-07-29

## 2021-07-26 RX ORDER — EPINEPHRINE 0.3 MG/.3ML
0.3 INJECTION SUBCUTANEOUS ONCE AS NEEDED
Status: CANCELLED | OUTPATIENT
Start: 2021-07-29

## 2021-07-26 RX ORDER — METHYLPREDNISOLONE SOD SUCC 125 MG
125 VIAL (EA) INJECTION ONCE AS NEEDED
Status: CANCELLED | OUTPATIENT
Start: 2021-07-29

## 2021-07-26 RX ORDER — SODIUM CHLORIDE 0.9 % (FLUSH) 0.9 %
10 SYRINGE (ML) INJECTION
Status: CANCELLED | OUTPATIENT
Start: 2021-07-29

## 2021-07-26 RX ADMIN — SODIUM CHLORIDE 125 MG: 9 INJECTION, SOLUTION INTRAVENOUS at 02:07

## 2021-07-29 ENCOUNTER — NURSE TRIAGE (OUTPATIENT)
Dept: ADMINISTRATIVE | Facility: CLINIC | Age: 56
End: 2021-07-29

## 2021-07-29 ENCOUNTER — TELEPHONE (OUTPATIENT)
Dept: INTERNAL MEDICINE | Facility: CLINIC | Age: 56
End: 2021-07-29

## 2021-07-29 ENCOUNTER — HOSPITAL ENCOUNTER (EMERGENCY)
Facility: HOSPITAL | Age: 56
Discharge: HOME | End: 2021-07-29
Attending: EMERGENCY MEDICINE
Payer: MEDICAID

## 2021-07-29 VITALS
RESPIRATION RATE: 24 BRPM | BODY MASS INDEX: 29.95 KG/M2 | TEMPERATURE: 97 F | OXYGEN SATURATION: 99 % | HEART RATE: 90 BPM | DIASTOLIC BLOOD PRESSURE: 79 MMHG | WEIGHT: 180 LBS | SYSTOLIC BLOOD PRESSURE: 142 MMHG

## 2021-07-29 DIAGNOSIS — I10 ESSENTIAL HYPERTENSION: ICD-10-CM

## 2021-07-29 DIAGNOSIS — R42 DIZZINESS: ICD-10-CM

## 2021-07-29 DIAGNOSIS — R06.02 SHORTNESS OF BREATH: Primary | ICD-10-CM

## 2021-07-29 LAB
ALBUMIN SERPL BCP-MCNC: 3.8 G/DL (ref 3.5–5.2)
ALP SERPL-CCNC: 188 U/L (ref 55–135)
ALT SERPL W/O P-5'-P-CCNC: 13 U/L (ref 10–44)
ANION GAP SERPL CALC-SCNC: 12 MMOL/L (ref 8–16)
AST SERPL-CCNC: 14 U/L (ref 10–40)
BASOPHILS # BLD AUTO: 0.05 K/UL (ref 0–0.2)
BASOPHILS NFR BLD: 0.4 % (ref 0–1.9)
BILIRUB SERPL-MCNC: 0.4 MG/DL (ref 0.1–1)
BNP SERPL-MCNC: <10 PG/ML (ref 0–99)
BUN SERPL-MCNC: 9 MG/DL (ref 6–20)
CALCIUM SERPL-MCNC: 9.8 MG/DL (ref 8.7–10.5)
CHLORIDE SERPL-SCNC: 104 MMOL/L (ref 95–110)
CO2 SERPL-SCNC: 25 MMOL/L (ref 23–29)
CREAT SERPL-MCNC: 0.9 MG/DL (ref 0.5–1.4)
CTP QC/QA: YES
DIFFERENTIAL METHOD: ABNORMAL
EOSINOPHIL # BLD AUTO: 0.1 K/UL (ref 0–0.5)
EOSINOPHIL NFR BLD: 0.7 % (ref 0–8)
ERYTHROCYTE [DISTWIDTH] IN BLOOD BY AUTOMATED COUNT: 17.5 % (ref 11.5–14.5)
EST. GFR  (AFRICAN AMERICAN): >60 ML/MIN/1.73 M^2
EST. GFR  (NON AFRICAN AMERICAN): >60 ML/MIN/1.73 M^2
GLUCOSE SERPL-MCNC: 103 MG/DL (ref 70–110)
HCT VFR BLD AUTO: 43.2 % (ref 37–48.5)
HCV AB SERPL QL IA: NEGATIVE
HEP C VIRUS HOLD SPECIMEN: NORMAL
HGB BLD-MCNC: 13.5 G/DL (ref 12–16)
HIV 1+2 AB+HIV1 P24 AG SERPL QL IA: NEGATIVE
IMM GRANULOCYTES # BLD AUTO: 0.07 K/UL (ref 0–0.04)
IMM GRANULOCYTES NFR BLD AUTO: 0.6 % (ref 0–0.5)
LYMPHOCYTES # BLD AUTO: 2.4 K/UL (ref 1–4.8)
LYMPHOCYTES NFR BLD: 19.4 % (ref 18–48)
MCH RBC QN AUTO: 25.2 PG (ref 27–31)
MCHC RBC AUTO-ENTMCNC: 31.3 G/DL (ref 32–36)
MCV RBC AUTO: 81 FL (ref 82–98)
MONOCYTES # BLD AUTO: 0.7 K/UL (ref 0.3–1)
MONOCYTES NFR BLD: 5.3 % (ref 4–15)
NEUTROPHILS # BLD AUTO: 9 K/UL (ref 1.8–7.7)
NEUTROPHILS NFR BLD: 73.6 % (ref 38–73)
NRBC BLD-RTO: 0 /100 WBC
PLATELET # BLD AUTO: 298 K/UL (ref 150–450)
PMV BLD AUTO: 9.4 FL (ref 9.2–12.9)
POTASSIUM SERPL-SCNC: 3.3 MMOL/L (ref 3.5–5.1)
PROT SERPL-MCNC: 7.9 G/DL (ref 6–8.4)
RBC # BLD AUTO: 5.36 M/UL (ref 4–5.4)
SARS-COV-2 RDRP RESP QL NAA+PROBE: NEGATIVE
SODIUM SERPL-SCNC: 141 MMOL/L (ref 136–145)
TROPONIN I SERPL DL<=0.01 NG/ML-MCNC: <0.006 NG/ML (ref 0–0.03)
WBC # BLD AUTO: 12.28 K/UL (ref 3.9–12.7)

## 2021-07-29 PROCEDURE — U0002 COVID-19 LAB TEST NON-CDC: HCPCS | Performed by: REGISTERED NURSE

## 2021-07-29 PROCEDURE — 80053 COMPREHEN METABOLIC PANEL: CPT | Performed by: REGISTERED NURSE

## 2021-07-29 PROCEDURE — 93010 EKG 12-LEAD: ICD-10-PCS | Mod: ,,, | Performed by: INTERNAL MEDICINE

## 2021-07-29 PROCEDURE — 87389 HIV-1 AG W/HIV-1&-2 AB AG IA: CPT | Performed by: EMERGENCY MEDICINE

## 2021-07-29 PROCEDURE — 85025 COMPLETE CBC W/AUTO DIFF WBC: CPT | Performed by: REGISTERED NURSE

## 2021-07-29 PROCEDURE — 83880 ASSAY OF NATRIURETIC PEPTIDE: CPT | Performed by: REGISTERED NURSE

## 2021-07-29 PROCEDURE — 86803 HEPATITIS C AB TEST: CPT | Performed by: EMERGENCY MEDICINE

## 2021-07-29 PROCEDURE — 25000003 PHARM REV CODE 250: Performed by: REGISTERED NURSE

## 2021-07-29 PROCEDURE — 84484 ASSAY OF TROPONIN QUANT: CPT | Performed by: REGISTERED NURSE

## 2021-07-29 PROCEDURE — 99285 EMERGENCY DEPT VISIT HI MDM: CPT | Mod: 25

## 2021-07-29 PROCEDURE — 93005 ELECTROCARDIOGRAM TRACING: CPT

## 2021-07-29 PROCEDURE — 93010 ELECTROCARDIOGRAM REPORT: CPT | Mod: ,,, | Performed by: INTERNAL MEDICINE

## 2021-07-29 RX ORDER — MECLIZINE HYDROCHLORIDE 25 MG/1
25 TABLET ORAL 3 TIMES DAILY PRN
Qty: 20 TABLET | Refills: 0 | Status: ON HOLD | OUTPATIENT
Start: 2021-07-29 | End: 2021-10-28 | Stop reason: HOSPADM

## 2021-07-29 RX ORDER — MECLIZINE HYDROCHLORIDE 25 MG/1
25 TABLET ORAL
Status: COMPLETED | OUTPATIENT
Start: 2021-07-29 | End: 2021-07-29

## 2021-07-29 RX ADMIN — MECLIZINE HYDROCHLORIDE 25 MG: 25 TABLET ORAL at 10:07

## 2021-08-03 ENCOUNTER — OFFICE VISIT (OUTPATIENT)
Dept: INTERNAL MEDICINE | Facility: CLINIC | Age: 56
End: 2021-08-03
Payer: MEDICAID

## 2021-08-03 ENCOUNTER — PATIENT MESSAGE (OUTPATIENT)
Dept: INTERNAL MEDICINE | Facility: CLINIC | Age: 56
End: 2021-08-03

## 2021-08-03 ENCOUNTER — TELEPHONE (OUTPATIENT)
Dept: PAIN MEDICINE | Facility: CLINIC | Age: 56
End: 2021-08-03

## 2021-08-03 ENCOUNTER — PATIENT MESSAGE (OUTPATIENT)
Dept: PAIN MEDICINE | Facility: CLINIC | Age: 56
End: 2021-08-03

## 2021-08-03 VITALS
HEIGHT: 65 IN | DIASTOLIC BLOOD PRESSURE: 92 MMHG | TEMPERATURE: 98 F | WEIGHT: 209.69 LBS | HEART RATE: 84 BPM | BODY MASS INDEX: 34.93 KG/M2 | OXYGEN SATURATION: 98 % | SYSTOLIC BLOOD PRESSURE: 138 MMHG

## 2021-08-03 DIAGNOSIS — R53.83 FATIGUE, UNSPECIFIED TYPE: ICD-10-CM

## 2021-08-03 DIAGNOSIS — I10 ESSENTIAL HYPERTENSION: Primary | Chronic | ICD-10-CM

## 2021-08-03 PROCEDURE — 99999 PR PBB SHADOW E&M-EST. PATIENT-LVL V: ICD-10-PCS | Mod: PBBFAC,,, | Performed by: PHYSICIAN ASSISTANT

## 2021-08-03 PROCEDURE — 99215 OFFICE O/P EST HI 40 MIN: CPT | Mod: PBBFAC | Performed by: PHYSICIAN ASSISTANT

## 2021-08-03 PROCEDURE — 99214 OFFICE O/P EST MOD 30 MIN: CPT | Mod: S$PBB,,, | Performed by: PHYSICIAN ASSISTANT

## 2021-08-03 PROCEDURE — 99999 PR PBB SHADOW E&M-EST. PATIENT-LVL V: CPT | Mod: PBBFAC,,, | Performed by: PHYSICIAN ASSISTANT

## 2021-08-03 PROCEDURE — 99214 PR OFFICE/OUTPT VISIT, EST, LEVL IV, 30-39 MIN: ICD-10-PCS | Mod: S$PBB,,, | Performed by: PHYSICIAN ASSISTANT

## 2021-08-03 RX ORDER — NIFEDIPINE 60 MG/1
60 TABLET, EXTENDED RELEASE ORAL NIGHTLY
Qty: 90 TABLET | Refills: 0 | Status: SHIPPED | OUTPATIENT
Start: 2021-08-03 | End: 2021-11-03

## 2021-08-05 ENCOUNTER — PATIENT OUTREACH (OUTPATIENT)
Dept: ADMINISTRATIVE | Facility: OTHER | Age: 56
End: 2021-08-05

## 2021-08-05 ENCOUNTER — HOSPITAL ENCOUNTER (OUTPATIENT)
Dept: RADIOLOGY | Facility: HOSPITAL | Age: 56
Discharge: HOME | End: 2021-08-05
Attending: FAMILY MEDICINE
Payer: MEDICAID

## 2021-08-05 VITALS — HEIGHT: 65 IN | BODY MASS INDEX: 34.89 KG/M2 | WEIGHT: 209.44 LBS

## 2021-08-05 DIAGNOSIS — Z12.31 ENCOUNTER FOR SCREENING MAMMOGRAM FOR BREAST CANCER: ICD-10-CM

## 2021-08-05 PROCEDURE — 77067 MAMMO DIGITAL SCREENING BILAT WITH TOMO: ICD-10-PCS | Mod: 26,,, | Performed by: RADIOLOGY

## 2021-08-05 PROCEDURE — 77067 SCR MAMMO BI INCL CAD: CPT | Mod: 26,,, | Performed by: RADIOLOGY

## 2021-08-05 PROCEDURE — 77063 BREAST TOMOSYNTHESIS BI: CPT | Mod: 26,,, | Performed by: RADIOLOGY

## 2021-08-05 PROCEDURE — 77067 SCR MAMMO BI INCL CAD: CPT | Mod: TC

## 2021-08-05 PROCEDURE — 77063 MAMMO DIGITAL SCREENING BILAT WITH TOMO: ICD-10-PCS | Mod: 26,,, | Performed by: RADIOLOGY

## 2021-08-06 ENCOUNTER — OFFICE VISIT (OUTPATIENT)
Dept: PAIN MEDICINE | Facility: CLINIC | Age: 56
End: 2021-08-06
Payer: MEDICAID

## 2021-08-06 VITALS — BODY MASS INDEX: 34.89 KG/M2 | HEIGHT: 65 IN | WEIGHT: 209.44 LBS | RESPIRATION RATE: 17 BRPM

## 2021-08-06 DIAGNOSIS — G47.01 INSOMNIA SECONDARY TO CHRONIC PAIN: ICD-10-CM

## 2021-08-06 DIAGNOSIS — M51.36 DEGENERATIVE DISC DISEASE, LUMBAR: ICD-10-CM

## 2021-08-06 DIAGNOSIS — M25.562 CHRONIC PAIN OF BOTH KNEES: Primary | ICD-10-CM

## 2021-08-06 DIAGNOSIS — G89.29 CHRONIC PAIN OF BOTH KNEES: Primary | ICD-10-CM

## 2021-08-06 DIAGNOSIS — G62.9 NEUROPATHY: ICD-10-CM

## 2021-08-06 DIAGNOSIS — M54.16 BILATERAL LUMBAR RADICULOPATHY: ICD-10-CM

## 2021-08-06 DIAGNOSIS — M79.18 LUMBAR MUSCLE PAIN: ICD-10-CM

## 2021-08-06 DIAGNOSIS — M53.3 SACROILIAC JOINT DYSFUNCTION: ICD-10-CM

## 2021-08-06 DIAGNOSIS — M25.561 CHRONIC PAIN OF BOTH KNEES: Primary | ICD-10-CM

## 2021-08-06 DIAGNOSIS — G89.29 INSOMNIA SECONDARY TO CHRONIC PAIN: ICD-10-CM

## 2021-08-06 PROCEDURE — 99214 OFFICE O/P EST MOD 30 MIN: CPT | Mod: 95,,, | Performed by: PHYSICIAN ASSISTANT

## 2021-08-06 PROCEDURE — 99214 PR OFFICE/OUTPT VISIT, EST, LEVL IV, 30-39 MIN: ICD-10-PCS | Mod: 95,,, | Performed by: PHYSICIAN ASSISTANT

## 2021-08-06 RX ORDER — AMITRIPTYLINE HYDROCHLORIDE 10 MG/1
10 TABLET, FILM COATED ORAL NIGHTLY PRN
Qty: 30 TABLET | Refills: 1 | Status: SHIPPED | OUTPATIENT
Start: 2021-08-06 | End: 2021-09-01 | Stop reason: SDUPTHER

## 2021-08-06 RX ORDER — GABAPENTIN 300 MG/1
300 CAPSULE ORAL 3 TIMES DAILY
Qty: 120 CAPSULE | Refills: 1 | Status: SHIPPED | OUTPATIENT
Start: 2021-08-06 | End: 2022-01-04 | Stop reason: SDUPTHER

## 2021-08-06 RX ORDER — CYCLOBENZAPRINE HCL 10 MG
5-10 TABLET ORAL 3 TIMES DAILY PRN
Qty: 90 TABLET | Refills: 1 | Status: SHIPPED | OUTPATIENT
Start: 2021-08-06 | End: 2021-11-11

## 2021-08-06 RX ORDER — GABAPENTIN 600 MG/1
600 TABLET ORAL 3 TIMES DAILY
Qty: 90 TABLET | Refills: 1 | Status: SHIPPED | OUTPATIENT
Start: 2021-08-06 | End: 2022-01-04 | Stop reason: SDUPTHER

## 2021-08-10 ENCOUNTER — PATIENT MESSAGE (OUTPATIENT)
Dept: INTERNAL MEDICINE | Facility: CLINIC | Age: 56
End: 2021-08-10

## 2021-08-12 ENCOUNTER — TELEPHONE (OUTPATIENT)
Dept: INTERNAL MEDICINE | Facility: CLINIC | Age: 56
End: 2021-08-12

## 2021-08-12 ENCOUNTER — LAB VISIT (OUTPATIENT)
Dept: LAB | Facility: HOSPITAL | Age: 56
End: 2021-08-12
Attending: INTERNAL MEDICINE
Payer: MEDICAID

## 2021-08-12 ENCOUNTER — OFFICE VISIT (OUTPATIENT)
Dept: HEMATOLOGY/ONCOLOGY | Facility: CLINIC | Age: 56
End: 2021-08-12
Payer: MEDICAID

## 2021-08-12 VITALS
OXYGEN SATURATION: 100 % | WEIGHT: 209.69 LBS | HEART RATE: 98 BPM | HEIGHT: 65 IN | DIASTOLIC BLOOD PRESSURE: 86 MMHG | TEMPERATURE: 98 F | BODY MASS INDEX: 34.93 KG/M2 | SYSTOLIC BLOOD PRESSURE: 144 MMHG

## 2021-08-12 DIAGNOSIS — D63.8 ANEMIA OF CHRONIC DISEASE: ICD-10-CM

## 2021-08-12 DIAGNOSIS — D69.6 THROMBOCYTOPENIA: ICD-10-CM

## 2021-08-12 LAB
ALBUMIN SERPL BCP-MCNC: 3.7 G/DL (ref 3.5–5.2)
ALP SERPL-CCNC: 188 U/L (ref 55–135)
ALT SERPL W/O P-5'-P-CCNC: 13 U/L (ref 10–44)
ANION GAP SERPL CALC-SCNC: 9 MMOL/L (ref 8–16)
AST SERPL-CCNC: 11 U/L (ref 10–40)
BASOPHILS # BLD AUTO: 0.03 K/UL (ref 0–0.2)
BASOPHILS NFR BLD: 0.3 % (ref 0–1.9)
BILIRUB SERPL-MCNC: 0.3 MG/DL (ref 0.1–1)
BUN SERPL-MCNC: 11 MG/DL (ref 6–20)
CALCIUM SERPL-MCNC: 9.3 MG/DL (ref 8.7–10.5)
CHLORIDE SERPL-SCNC: 104 MMOL/L (ref 95–110)
CO2 SERPL-SCNC: 27 MMOL/L (ref 23–29)
CREAT SERPL-MCNC: 0.9 MG/DL (ref 0.5–1.4)
DIFFERENTIAL METHOD: ABNORMAL
EOSINOPHIL # BLD AUTO: 0.1 K/UL (ref 0–0.5)
EOSINOPHIL NFR BLD: 0.8 % (ref 0–8)
ERYTHROCYTE [DISTWIDTH] IN BLOOD BY AUTOMATED COUNT: 17.6 % (ref 11.5–14.5)
EST. GFR  (AFRICAN AMERICAN): >60 ML/MIN/1.73 M^2
EST. GFR  (NON AFRICAN AMERICAN): >60 ML/MIN/1.73 M^2
FERRITIN SERPL-MCNC: 266 NG/ML (ref 20–300)
GLUCOSE SERPL-MCNC: 99 MG/DL (ref 70–110)
HCT VFR BLD AUTO: 42 % (ref 37–48.5)
HGB BLD-MCNC: 13.3 G/DL (ref 12–16)
IMM GRANULOCYTES # BLD AUTO: 0.04 K/UL (ref 0–0.04)
IMM GRANULOCYTES NFR BLD AUTO: 0.3 % (ref 0–0.5)
IRON SERPL-MCNC: 51 UG/DL (ref 30–160)
LYMPHOCYTES # BLD AUTO: 2.5 K/UL (ref 1–4.8)
LYMPHOCYTES NFR BLD: 20.5 % (ref 18–48)
MCH RBC QN AUTO: 25.4 PG (ref 27–31)
MCHC RBC AUTO-ENTMCNC: 31.7 G/DL (ref 32–36)
MCV RBC AUTO: 80 FL (ref 82–98)
MONOCYTES # BLD AUTO: 0.6 K/UL (ref 0.3–1)
MONOCYTES NFR BLD: 5.1 % (ref 4–15)
NEUTROPHILS # BLD AUTO: 8.8 K/UL (ref 1.8–7.7)
NEUTROPHILS NFR BLD: 73 % (ref 38–73)
NRBC BLD-RTO: 0 /100 WBC
PLATELET # BLD AUTO: 308 K/UL (ref 150–450)
PMV BLD AUTO: 9.9 FL (ref 9.2–12.9)
POTASSIUM SERPL-SCNC: 3.2 MMOL/L (ref 3.5–5.1)
PROT SERPL-MCNC: 7.8 G/DL (ref 6–8.4)
RBC # BLD AUTO: 5.24 M/UL (ref 4–5.4)
SATURATED IRON: 17 % (ref 20–50)
SODIUM SERPL-SCNC: 140 MMOL/L (ref 136–145)
TOTAL IRON BINDING CAPACITY: 302 UG/DL (ref 250–450)
TRANSFERRIN SERPL-MCNC: 204 MG/DL (ref 200–375)
WBC # BLD AUTO: 11.99 K/UL (ref 3.9–12.7)

## 2021-08-12 PROCEDURE — 80053 COMPREHEN METABOLIC PANEL: CPT | Performed by: INTERNAL MEDICINE

## 2021-08-12 PROCEDURE — 84466 ASSAY OF TRANSFERRIN: CPT | Performed by: INTERNAL MEDICINE

## 2021-08-12 PROCEDURE — 99214 OFFICE O/P EST MOD 30 MIN: CPT | Mod: S$PBB,,, | Performed by: INTERNAL MEDICINE

## 2021-08-12 PROCEDURE — 82728 ASSAY OF FERRITIN: CPT | Performed by: INTERNAL MEDICINE

## 2021-08-12 PROCEDURE — 83520 IMMUNOASSAY QUANT NOS NONAB: CPT | Mod: 59 | Performed by: INTERNAL MEDICINE

## 2021-08-12 PROCEDURE — 99214 OFFICE O/P EST MOD 30 MIN: CPT | Mod: PBBFAC | Performed by: INTERNAL MEDICINE

## 2021-08-12 PROCEDURE — 84165 PATHOLOGIST INTERPRETATION SPE: ICD-10-PCS | Mod: 26,,, | Performed by: PATHOLOGY

## 2021-08-12 PROCEDURE — 86334 PATHOLOGIST INTERPRETATION IFE: ICD-10-PCS | Mod: 26,,, | Performed by: PATHOLOGY

## 2021-08-12 PROCEDURE — 84165 PROTEIN E-PHORESIS SERUM: CPT | Performed by: INTERNAL MEDICINE

## 2021-08-12 PROCEDURE — 85025 COMPLETE CBC W/AUTO DIFF WBC: CPT | Performed by: INTERNAL MEDICINE

## 2021-08-12 PROCEDURE — 84165 PROTEIN E-PHORESIS SERUM: CPT | Mod: 26,,, | Performed by: PATHOLOGY

## 2021-08-12 PROCEDURE — 86334 IMMUNOFIX E-PHORESIS SERUM: CPT | Performed by: INTERNAL MEDICINE

## 2021-08-12 PROCEDURE — 99999 PR PBB SHADOW E&M-EST. PATIENT-LVL IV: ICD-10-PCS | Mod: PBBFAC,,, | Performed by: INTERNAL MEDICINE

## 2021-08-12 PROCEDURE — 86334 IMMUNOFIX E-PHORESIS SERUM: CPT | Mod: 26,,, | Performed by: PATHOLOGY

## 2021-08-12 PROCEDURE — 99214 PR OFFICE/OUTPT VISIT, EST, LEVL IV, 30-39 MIN: ICD-10-PCS | Mod: S$PBB,,, | Performed by: INTERNAL MEDICINE

## 2021-08-12 PROCEDURE — 99999 PR PBB SHADOW E&M-EST. PATIENT-LVL IV: CPT | Mod: PBBFAC,,, | Performed by: INTERNAL MEDICINE

## 2021-08-12 PROCEDURE — 36415 COLL VENOUS BLD VENIPUNCTURE: CPT | Performed by: INTERNAL MEDICINE

## 2021-08-13 LAB
ALBUMIN SERPL ELPH-MCNC: 3.74 G/DL (ref 3.35–5.55)
ALPHA1 GLOB SERPL ELPH-MCNC: 0.43 G/DL (ref 0.17–0.41)
ALPHA2 GLOB SERPL ELPH-MCNC: 1.03 G/DL (ref 0.43–0.99)
B-GLOBULIN SERPL ELPH-MCNC: 0.81 G/DL (ref 0.5–1.1)
GAMMA GLOB SERPL ELPH-MCNC: 1.19 G/DL (ref 0.67–1.58)
INTERPRETATION SERPL IFE-IMP: NORMAL
KAPPA LC SER QL IA: 2.37 MG/DL (ref 0.33–1.94)
KAPPA LC/LAMBDA SER IA: 1.24 (ref 0.26–1.65)
LAMBDA LC SER QL IA: 1.91 MG/DL (ref 0.57–2.63)
PROT SERPL-MCNC: 7.2 G/DL (ref 6–8.4)

## 2021-08-16 LAB
PATHOLOGIST INTERPRETATION IFE: NORMAL
PATHOLOGIST INTERPRETATION SPE: NORMAL

## 2021-08-17 ENCOUNTER — PATIENT MESSAGE (OUTPATIENT)
Dept: INTERNAL MEDICINE | Facility: CLINIC | Age: 56
End: 2021-08-17

## 2021-08-23 ENCOUNTER — OFFICE VISIT (OUTPATIENT)
Dept: INTERNAL MEDICINE | Facility: CLINIC | Age: 56
End: 2021-08-23
Payer: MEDICAID

## 2021-08-23 VITALS
HEIGHT: 65 IN | DIASTOLIC BLOOD PRESSURE: 84 MMHG | SYSTOLIC BLOOD PRESSURE: 140 MMHG | BODY MASS INDEX: 35.01 KG/M2 | TEMPERATURE: 97 F | HEART RATE: 74 BPM | WEIGHT: 210.13 LBS

## 2021-08-23 DIAGNOSIS — I10 ESSENTIAL HYPERTENSION: Primary | Chronic | ICD-10-CM

## 2021-08-23 PROCEDURE — 99999 PR PBB SHADOW E&M-EST. PATIENT-LVL IV: ICD-10-PCS | Mod: PBBFAC,,, | Performed by: NURSE PRACTITIONER

## 2021-08-23 PROCEDURE — 99999 PR PBB SHADOW E&M-EST. PATIENT-LVL IV: CPT | Mod: PBBFAC,,, | Performed by: NURSE PRACTITIONER

## 2021-08-23 PROCEDURE — 99214 OFFICE O/P EST MOD 30 MIN: CPT | Mod: PBBFAC | Performed by: NURSE PRACTITIONER

## 2021-08-23 PROCEDURE — 99213 OFFICE O/P EST LOW 20 MIN: CPT | Mod: S$PBB,,, | Performed by: NURSE PRACTITIONER

## 2021-08-23 PROCEDURE — 99213 PR OFFICE/OUTPT VISIT, EST, LEVL III, 20-29 MIN: ICD-10-PCS | Mod: S$PBB,,, | Performed by: NURSE PRACTITIONER

## 2021-08-23 RX ORDER — CHLORTHALIDONE 25 MG/1
25 TABLET ORAL DAILY
Qty: 30 TABLET | Refills: 11 | Status: SHIPPED | OUTPATIENT
Start: 2021-08-23 | End: 2021-09-09

## 2021-08-26 ENCOUNTER — PATIENT MESSAGE (OUTPATIENT)
Dept: INTERNAL MEDICINE | Facility: CLINIC | Age: 56
End: 2021-08-26

## 2021-09-01 DIAGNOSIS — G47.01 INSOMNIA SECONDARY TO CHRONIC PAIN: ICD-10-CM

## 2021-09-01 DIAGNOSIS — G89.29 INSOMNIA SECONDARY TO CHRONIC PAIN: ICD-10-CM

## 2021-09-01 DIAGNOSIS — K21.00 GASTROESOPHAGEAL REFLUX DISEASE WITH ESOPHAGITIS WITHOUT HEMORRHAGE: ICD-10-CM

## 2021-09-01 RX ORDER — AMITRIPTYLINE HYDROCHLORIDE 10 MG/1
10 TABLET, FILM COATED ORAL NIGHTLY PRN
Qty: 30 TABLET | Refills: 1 | Status: SHIPPED | OUTPATIENT
Start: 2021-09-01 | End: 2021-11-11 | Stop reason: SDUPTHER

## 2021-09-01 RX ORDER — FAMOTIDINE 20 MG/1
20 TABLET, FILM COATED ORAL 2 TIMES DAILY
Qty: 180 TABLET | Refills: 4 | Status: SHIPPED | OUTPATIENT
Start: 2021-09-01 | End: 2021-12-01 | Stop reason: SDUPTHER

## 2021-09-09 ENCOUNTER — OFFICE VISIT (OUTPATIENT)
Dept: INTERNAL MEDICINE | Facility: CLINIC | Age: 56
End: 2021-09-09
Payer: MEDICAID

## 2021-09-09 VITALS
BODY MASS INDEX: 33.83 KG/M2 | HEIGHT: 65 IN | TEMPERATURE: 99 F | WEIGHT: 203.06 LBS | SYSTOLIC BLOOD PRESSURE: 130 MMHG | HEART RATE: 70 BPM | DIASTOLIC BLOOD PRESSURE: 90 MMHG

## 2021-09-09 DIAGNOSIS — M62.838 NECK MUSCLE SPASM: ICD-10-CM

## 2021-09-09 DIAGNOSIS — I10 ESSENTIAL HYPERTENSION: Chronic | ICD-10-CM

## 2021-09-09 DIAGNOSIS — M54.2 NECK PAIN: Primary | ICD-10-CM

## 2021-09-09 PROCEDURE — 99214 OFFICE O/P EST MOD 30 MIN: CPT | Mod: 25,PBBFAC | Performed by: NURSE PRACTITIONER

## 2021-09-09 PROCEDURE — 96372 THER/PROPH/DIAG INJ SC/IM: CPT | Mod: PBBFAC

## 2021-09-09 PROCEDURE — 99999 PR PBB SHADOW E&M-EST. PATIENT-LVL IV: ICD-10-PCS | Mod: PBBFAC,,, | Performed by: NURSE PRACTITIONER

## 2021-09-09 PROCEDURE — 99999 PR PBB SHADOW E&M-EST. PATIENT-LVL IV: CPT | Mod: PBBFAC,,, | Performed by: NURSE PRACTITIONER

## 2021-09-09 PROCEDURE — 99214 PR OFFICE/OUTPT VISIT, EST, LEVL IV, 30-39 MIN: ICD-10-PCS | Mod: S$PBB,,, | Performed by: NURSE PRACTITIONER

## 2021-09-09 PROCEDURE — 99214 OFFICE O/P EST MOD 30 MIN: CPT | Mod: S$PBB,,, | Performed by: NURSE PRACTITIONER

## 2021-09-09 RX ORDER — CHLORTHALIDONE 25 MG/1
12.5 TABLET ORAL DAILY
Qty: 15 TABLET | Refills: 11
Start: 2021-09-09 | End: 2021-09-09

## 2021-09-09 RX ORDER — POTASSIUM CHLORIDE 20 MEQ/1
20 TABLET, EXTENDED RELEASE ORAL 2 TIMES DAILY
Qty: 180 TABLET | Refills: 3 | Status: SHIPPED | OUTPATIENT
Start: 2021-09-09 | End: 2021-11-05

## 2021-09-09 RX ORDER — CHLORTHALIDONE 25 MG/1
12.5 TABLET ORAL DAILY
Qty: 15 TABLET | Refills: 11
Start: 2021-09-09 | End: 2021-09-20

## 2021-09-09 RX ORDER — DICLOFENAC SODIUM 10 MG/G
2 GEL TOPICAL 2 TIMES DAILY
Qty: 100 G | Refills: 0 | Status: SHIPPED | OUTPATIENT
Start: 2021-09-09 | End: 2022-06-16

## 2021-09-09 RX ORDER — METHYLPREDNISOLONE ACETATE 40 MG/ML
40 INJECTION, SUSPENSION INTRA-ARTICULAR; INTRALESIONAL; INTRAMUSCULAR; SOFT TISSUE
Status: COMPLETED | OUTPATIENT
Start: 2021-09-09 | End: 2021-09-09

## 2021-09-09 RX ADMIN — METHYLPREDNISOLONE ACETATE 40 MG: 40 INJECTION, SUSPENSION INTRALESIONAL; INTRAMUSCULAR; INTRASYNOVIAL; SOFT TISSUE at 09:09

## 2021-09-10 ENCOUNTER — PATIENT MESSAGE (OUTPATIENT)
Dept: INTERNAL MEDICINE | Facility: CLINIC | Age: 56
End: 2021-09-10

## 2021-09-10 ENCOUNTER — TELEPHONE (OUTPATIENT)
Dept: INTERNAL MEDICINE | Facility: CLINIC | Age: 56
End: 2021-09-10

## 2021-09-16 ENCOUNTER — LAB VISIT (OUTPATIENT)
Dept: LAB | Facility: HOSPITAL | Age: 56
End: 2021-09-16
Attending: FAMILY MEDICINE
Payer: MEDICAID

## 2021-09-16 ENCOUNTER — PATIENT MESSAGE (OUTPATIENT)
Dept: INTERNAL MEDICINE | Facility: CLINIC | Age: 56
End: 2021-09-16

## 2021-09-16 DIAGNOSIS — M62.838 NECK MUSCLE SPASM: ICD-10-CM

## 2021-09-16 DIAGNOSIS — I10 ESSENTIAL HYPERTENSION: Chronic | ICD-10-CM

## 2021-09-16 LAB — POTASSIUM SERPL-SCNC: 3.2 MMOL/L (ref 3.5–5.1)

## 2021-09-16 PROCEDURE — 84132 ASSAY OF SERUM POTASSIUM: CPT | Performed by: NURSE PRACTITIONER

## 2021-09-16 PROCEDURE — 36415 COLL VENOUS BLD VENIPUNCTURE: CPT | Performed by: NURSE PRACTITIONER

## 2021-09-20 RX ORDER — LOSARTAN POTASSIUM 25 MG/1
25 TABLET ORAL DAILY
Qty: 30 TABLET | Refills: 3 | Status: SHIPPED | OUTPATIENT
Start: 2021-09-20 | End: 2021-12-13 | Stop reason: SDUPTHER

## 2021-09-24 ENCOUNTER — PATIENT OUTREACH (OUTPATIENT)
Dept: ADMINISTRATIVE | Facility: HOSPITAL | Age: 56
End: 2021-09-24

## 2021-09-27 ENCOUNTER — TELEPHONE (OUTPATIENT)
Dept: PAIN MEDICINE | Facility: CLINIC | Age: 56
End: 2021-09-27

## 2021-09-29 ENCOUNTER — PATIENT MESSAGE (OUTPATIENT)
Dept: PAIN MEDICINE | Facility: CLINIC | Age: 56
End: 2021-09-29

## 2021-09-29 ENCOUNTER — TELEPHONE (OUTPATIENT)
Dept: PAIN MEDICINE | Facility: CLINIC | Age: 56
End: 2021-09-29

## 2021-09-29 DIAGNOSIS — M79.18 PIRIFORMIS MUSCLE PAIN: ICD-10-CM

## 2021-09-29 DIAGNOSIS — M70.62 GREATER TROCHANTERIC BURSITIS OF BOTH HIPS: ICD-10-CM

## 2021-09-29 DIAGNOSIS — M53.3 SACROILIAC JOINT DYSFUNCTION: Primary | ICD-10-CM

## 2021-09-29 DIAGNOSIS — M70.61 GREATER TROCHANTERIC BURSITIS OF BOTH HIPS: ICD-10-CM

## 2021-09-29 DIAGNOSIS — M46.1 SACROILIITIS: ICD-10-CM

## 2021-10-06 ENCOUNTER — IMMUNIZATION (OUTPATIENT)
Dept: PHARMACY | Facility: CLINIC | Age: 56
End: 2021-10-06
Payer: MEDICAID

## 2021-10-06 DIAGNOSIS — Z23 NEED FOR VACCINATION: Primary | ICD-10-CM

## 2021-10-08 ENCOUNTER — TELEPHONE (OUTPATIENT)
Dept: PAIN MEDICINE | Facility: CLINIC | Age: 56
End: 2021-10-08

## 2021-10-11 ENCOUNTER — TELEPHONE (OUTPATIENT)
Dept: PAIN MEDICINE | Facility: CLINIC | Age: 56
End: 2021-10-11

## 2021-10-12 ENCOUNTER — LAB VISIT (OUTPATIENT)
Dept: LAB | Facility: HOSPITAL | Age: 56
End: 2021-10-12
Attending: INTERNAL MEDICINE
Payer: MEDICAID

## 2021-10-12 DIAGNOSIS — Z94.4 LIVER REPLACED BY TRANSPLANT: ICD-10-CM

## 2021-10-12 LAB
ALBUMIN SERPL BCP-MCNC: 3.5 G/DL (ref 3.5–5.2)
ALP SERPL-CCNC: 191 U/L (ref 55–135)
ALT SERPL W/O P-5'-P-CCNC: 16 U/L (ref 10–44)
ANION GAP SERPL CALC-SCNC: 13 MMOL/L (ref 8–16)
AST SERPL-CCNC: 14 U/L (ref 10–40)
BASOPHILS # BLD AUTO: 0.03 K/UL (ref 0–0.2)
BASOPHILS NFR BLD: 0.3 % (ref 0–1.9)
BILIRUB SERPL-MCNC: 0.5 MG/DL (ref 0.1–1)
BUN SERPL-MCNC: 10 MG/DL (ref 6–20)
CALCIUM SERPL-MCNC: 9.7 MG/DL (ref 8.7–10.5)
CHLORIDE SERPL-SCNC: 105 MMOL/L (ref 95–110)
CO2 SERPL-SCNC: 24 MMOL/L (ref 23–29)
CREAT SERPL-MCNC: 0.9 MG/DL (ref 0.5–1.4)
DIFFERENTIAL METHOD: ABNORMAL
EOSINOPHIL # BLD AUTO: 0.2 K/UL (ref 0–0.5)
EOSINOPHIL NFR BLD: 1.7 % (ref 0–8)
ERYTHROCYTE [DISTWIDTH] IN BLOOD BY AUTOMATED COUNT: 16.4 % (ref 11.5–14.5)
EST. GFR  (AFRICAN AMERICAN): >60 ML/MIN/1.73 M^2
EST. GFR  (NON AFRICAN AMERICAN): >60 ML/MIN/1.73 M^2
GLUCOSE SERPL-MCNC: 94 MG/DL (ref 70–110)
HCT VFR BLD AUTO: 37 % (ref 37–48.5)
HGB BLD-MCNC: 12 G/DL (ref 12–16)
IMM GRANULOCYTES # BLD AUTO: 0.04 K/UL (ref 0–0.04)
IMM GRANULOCYTES NFR BLD AUTO: 0.4 % (ref 0–0.5)
LYMPHOCYTES # BLD AUTO: 2.3 K/UL (ref 1–4.8)
LYMPHOCYTES NFR BLD: 22 % (ref 18–48)
MAGNESIUM SERPL-MCNC: 1.7 MG/DL (ref 1.6–2.6)
MCH RBC QN AUTO: 27 PG (ref 27–31)
MCHC RBC AUTO-ENTMCNC: 32.4 G/DL (ref 32–36)
MCV RBC AUTO: 83 FL (ref 82–98)
MONOCYTES # BLD AUTO: 0.5 K/UL (ref 0.3–1)
MONOCYTES NFR BLD: 5.1 % (ref 4–15)
NEUTROPHILS # BLD AUTO: 7.5 K/UL (ref 1.8–7.7)
NEUTROPHILS NFR BLD: 70.5 % (ref 38–73)
NRBC BLD-RTO: 0 /100 WBC
PLATELET # BLD AUTO: 95 K/UL (ref 150–450)
PMV BLD AUTO: 11.3 FL (ref 9.2–12.9)
POTASSIUM SERPL-SCNC: 3.6 MMOL/L (ref 3.5–5.1)
PROT SERPL-MCNC: 7.3 G/DL (ref 6–8.4)
RBC # BLD AUTO: 4.45 M/UL (ref 4–5.4)
SODIUM SERPL-SCNC: 142 MMOL/L (ref 136–145)
WBC # BLD AUTO: 10.6 K/UL (ref 3.9–12.7)

## 2021-10-12 PROCEDURE — 36415 COLL VENOUS BLD VENIPUNCTURE: CPT | Performed by: INTERNAL MEDICINE

## 2021-10-12 PROCEDURE — 85025 COMPLETE CBC W/AUTO DIFF WBC: CPT | Performed by: INTERNAL MEDICINE

## 2021-10-12 PROCEDURE — 83735 ASSAY OF MAGNESIUM: CPT | Performed by: INTERNAL MEDICINE

## 2021-10-12 PROCEDURE — 80053 COMPREHEN METABOLIC PANEL: CPT | Performed by: INTERNAL MEDICINE

## 2021-10-12 PROCEDURE — 80197 ASSAY OF TACROLIMUS: CPT | Performed by: INTERNAL MEDICINE

## 2021-10-13 ENCOUNTER — PATIENT MESSAGE (OUTPATIENT)
Dept: INTERNAL MEDICINE | Facility: CLINIC | Age: 56
End: 2021-10-13
Payer: MEDICAID

## 2021-10-13 ENCOUNTER — TELEPHONE (OUTPATIENT)
Dept: INTERNAL MEDICINE | Facility: CLINIC | Age: 56
End: 2021-10-13

## 2021-10-13 DIAGNOSIS — I10 ESSENTIAL HYPERTENSION: Primary | Chronic | ICD-10-CM

## 2021-10-13 LAB
TACROLIMUS BLD-MCNC: 6.2 NG/ML (ref 5–15)
TACROLIMUS, NORMALIZED: 5.7 NG/ML (ref 5–15)

## 2021-10-14 ENCOUNTER — TELEPHONE (OUTPATIENT)
Dept: TRANSPLANT | Facility: CLINIC | Age: 56
End: 2021-10-14

## 2021-10-14 DIAGNOSIS — Z94.4 LIVER REPLACED BY TRANSPLANT: Primary | ICD-10-CM

## 2021-10-19 ENCOUNTER — TELEPHONE (OUTPATIENT)
Dept: INTERNAL MEDICINE | Facility: CLINIC | Age: 56
End: 2021-10-19

## 2021-10-20 DIAGNOSIS — M25.561 CHRONIC PAIN OF RIGHT KNEE: ICD-10-CM

## 2021-10-20 DIAGNOSIS — G89.29 CHRONIC PAIN OF RIGHT KNEE: ICD-10-CM

## 2021-10-20 DIAGNOSIS — M54.16 LUMBAR RADICULOPATHY: Primary | ICD-10-CM

## 2021-10-25 ENCOUNTER — TELEPHONE (OUTPATIENT)
Dept: PAIN MEDICINE | Facility: CLINIC | Age: 56
End: 2021-10-25
Payer: MEDICAID

## 2021-10-26 ENCOUNTER — TELEPHONE (OUTPATIENT)
Dept: PAIN MEDICINE | Facility: CLINIC | Age: 56
End: 2021-10-26
Payer: MEDICAID

## 2021-10-28 ENCOUNTER — HOSPITAL ENCOUNTER (OUTPATIENT)
Facility: HOSPITAL | Age: 56
Discharge: HOME | End: 2021-10-28
Attending: ANESTHESIOLOGY | Admitting: ANESTHESIOLOGY
Payer: MEDICAID

## 2021-10-28 VITALS
OXYGEN SATURATION: 100 % | HEIGHT: 65 IN | HEART RATE: 80 BPM | SYSTOLIC BLOOD PRESSURE: 139 MMHG | RESPIRATION RATE: 16 BRPM | BODY MASS INDEX: 33.31 KG/M2 | TEMPERATURE: 98 F | WEIGHT: 199.94 LBS | DIASTOLIC BLOOD PRESSURE: 75 MMHG

## 2021-10-28 DIAGNOSIS — M54.16 LUMBAR RADICULOPATHY: Primary | ICD-10-CM

## 2021-10-28 PROCEDURE — 64483 NJX AA&/STRD TFRM EPI L/S 1: CPT | Mod: 50 | Performed by: ANESTHESIOLOGY

## 2021-10-28 PROCEDURE — 63600175 PHARM REV CODE 636 W HCPCS: Performed by: ANESTHESIOLOGY

## 2021-10-28 PROCEDURE — 25000003 PHARM REV CODE 250: Performed by: ANESTHESIOLOGY

## 2021-10-28 PROCEDURE — 64483 PR EPIDURAL INJ, ANES/STEROID, TRANSFORAMINAL, LUMB/SACR, SNGL LEVL: ICD-10-PCS | Mod: 50,,, | Performed by: ANESTHESIOLOGY

## 2021-10-28 PROCEDURE — 20610 DRAIN/INJ JOINT/BURSA W/O US: CPT | Mod: RT,,, | Performed by: ANESTHESIOLOGY

## 2021-10-28 PROCEDURE — 99152 MOD SED SAME PHYS/QHP 5/>YRS: CPT | Performed by: ANESTHESIOLOGY

## 2021-10-28 PROCEDURE — 64483 NJX AA&/STRD TFRM EPI L/S 1: CPT | Mod: 50,,, | Performed by: ANESTHESIOLOGY

## 2021-10-28 PROCEDURE — 20610 DRAIN/INJ JOINT/BURSA W/O US: CPT | Performed by: ANESTHESIOLOGY

## 2021-10-28 PROCEDURE — 25500020 PHARM REV CODE 255: Performed by: ANESTHESIOLOGY

## 2021-10-28 PROCEDURE — 20610 PR DRAIN/INJECT LARGE JOINT/BURSA: ICD-10-PCS | Mod: RT,,, | Performed by: ANESTHESIOLOGY

## 2021-10-28 RX ORDER — LIDOCAINE HYDROCHLORIDE 10 MG/ML
INJECTION, SOLUTION EPIDURAL; INFILTRATION; INTRACAUDAL; PERINEURAL
Status: DISCONTINUED | OUTPATIENT
Start: 2021-10-28 | End: 2021-10-28 | Stop reason: HOSPADM

## 2021-10-28 RX ORDER — FENTANYL CITRATE 50 UG/ML
INJECTION, SOLUTION INTRAMUSCULAR; INTRAVENOUS
Status: DISCONTINUED | OUTPATIENT
Start: 2021-10-28 | End: 2021-10-28 | Stop reason: HOSPADM

## 2021-10-28 RX ORDER — METHYLPREDNISOLONE ACETATE 40 MG/ML
INJECTION, SUSPENSION INTRA-ARTICULAR; INTRALESIONAL; INTRAMUSCULAR; SOFT TISSUE
Status: DISCONTINUED | OUTPATIENT
Start: 2021-10-28 | End: 2021-10-28 | Stop reason: HOSPADM

## 2021-10-28 RX ORDER — INDOMETHACIN 25 MG/1
CAPSULE ORAL
Status: DISCONTINUED | OUTPATIENT
Start: 2021-10-28 | End: 2021-10-28 | Stop reason: HOSPADM

## 2021-10-28 RX ORDER — LIDOCAINE HYDROCHLORIDE 20 MG/ML
INJECTION, SOLUTION EPIDURAL; INFILTRATION; INTRACAUDAL; PERINEURAL
Status: DISCONTINUED | OUTPATIENT
Start: 2021-10-28 | End: 2021-10-28 | Stop reason: HOSPADM

## 2021-10-28 RX ORDER — MIDAZOLAM HYDROCHLORIDE 1 MG/ML
INJECTION, SOLUTION INTRAMUSCULAR; INTRAVENOUS
Status: DISCONTINUED | OUTPATIENT
Start: 2021-10-28 | End: 2021-10-28 | Stop reason: HOSPADM

## 2021-10-28 RX ORDER — DEXAMETHASONE SODIUM PHOSPHATE 10 MG/ML
INJECTION INTRAMUSCULAR; INTRAVENOUS
Status: DISCONTINUED | OUTPATIENT
Start: 2021-10-28 | End: 2021-10-28 | Stop reason: HOSPADM

## 2021-10-29 ENCOUNTER — PATIENT MESSAGE (OUTPATIENT)
Dept: INTERNAL MEDICINE | Facility: CLINIC | Age: 56
End: 2021-10-29
Payer: MEDICAID

## 2021-11-04 NOTE — PLAN OF CARE
----- Message from Marina Vázquezbarry sent at 11/4/2021  2:39 PM EDT -----  Subject: Message to Provider    QUESTIONS  Information for Provider? Patient scheduled an appointment for 11/11/2021,   he is currently having L hip pain and would like to come in sooner if   possible. Please call patient and advise.   ---------------------------------------------------------------------------  --------------  CALL BACK INFO  What is the best way for the office to contact you? OK to leave message on   voicemail  Preferred Call Back Phone Number? [phone]  ---------------------------------------------------------------------------  --------------  SCRIPT ANSWERS  Relationship to Patient?  Self Problem: Patient Care Overview  Goal: Plan of Care Review  Outcome: Ongoing (interventions implemented as appropriate)  Pt is AOx4. AVSS throughout shift. IV mag administered. Refused night time lactulose. Incontinence care provided. No acute events/changes occurred overnight. WCTM.

## 2021-11-05 ENCOUNTER — OFFICE VISIT (OUTPATIENT)
Dept: INTERNAL MEDICINE | Facility: CLINIC | Age: 56
End: 2021-11-05
Payer: MEDICAID

## 2021-11-05 VITALS
TEMPERATURE: 98 F | DIASTOLIC BLOOD PRESSURE: 82 MMHG | SYSTOLIC BLOOD PRESSURE: 106 MMHG | HEART RATE: 84 BPM | BODY MASS INDEX: 34.23 KG/M2 | WEIGHT: 205.69 LBS

## 2021-11-05 DIAGNOSIS — Z09 FOLLOW UP: Primary | ICD-10-CM

## 2021-11-05 PROCEDURE — 99999 PR PBB SHADOW E&M-EST. PATIENT-LVL III: CPT | Mod: PBBFAC,,, | Performed by: NURSE PRACTITIONER

## 2021-11-05 PROCEDURE — 99213 PR OFFICE/OUTPT VISIT, EST, LEVL III, 20-29 MIN: ICD-10-PCS | Mod: S$PBB,,, | Performed by: NURSE PRACTITIONER

## 2021-11-05 PROCEDURE — 99213 OFFICE O/P EST LOW 20 MIN: CPT | Mod: S$PBB,,, | Performed by: NURSE PRACTITIONER

## 2021-11-05 PROCEDURE — 99213 OFFICE O/P EST LOW 20 MIN: CPT | Mod: PBBFAC | Performed by: NURSE PRACTITIONER

## 2021-11-05 PROCEDURE — 99999 PR PBB SHADOW E&M-EST. PATIENT-LVL III: ICD-10-PCS | Mod: PBBFAC,,, | Performed by: NURSE PRACTITIONER

## 2021-11-05 RX ORDER — POTASSIUM CHLORIDE 750 MG/1
20 TABLET, EXTENDED RELEASE ORAL 2 TIMES DAILY
Qty: 360 TABLET | Refills: 2 | Status: SHIPPED | OUTPATIENT
Start: 2021-11-05 | End: 2021-12-13 | Stop reason: SDUPTHER

## 2021-11-10 ENCOUNTER — LAB VISIT (OUTPATIENT)
Dept: LAB | Facility: HOSPITAL | Age: 56
End: 2021-11-10
Attending: INTERNAL MEDICINE
Payer: MEDICAID

## 2021-11-10 ENCOUNTER — OFFICE VISIT (OUTPATIENT)
Dept: HEMATOLOGY/ONCOLOGY | Facility: CLINIC | Age: 56
End: 2021-11-10
Payer: MEDICAID

## 2021-11-10 VITALS
TEMPERATURE: 98 F | OXYGEN SATURATION: 99 % | BODY MASS INDEX: 33.91 KG/M2 | HEART RATE: 89 BPM | HEIGHT: 65 IN | WEIGHT: 203.5 LBS | DIASTOLIC BLOOD PRESSURE: 81 MMHG | SYSTOLIC BLOOD PRESSURE: 137 MMHG

## 2021-11-10 DIAGNOSIS — D63.8 ANEMIA OF CHRONIC DISEASE: ICD-10-CM

## 2021-11-10 DIAGNOSIS — D69.6 THROMBOCYTOPENIA: ICD-10-CM

## 2021-11-10 DIAGNOSIS — Z94.4 LIVER REPLACED BY TRANSPLANT: ICD-10-CM

## 2021-11-10 DIAGNOSIS — R63.4 UNINTENTIONAL WEIGHT LOSS: ICD-10-CM

## 2021-11-10 LAB
ALBUMIN SERPL BCP-MCNC: 3.6 G/DL (ref 3.5–5.2)
ALP SERPL-CCNC: 175 U/L (ref 55–135)
ALT SERPL W/O P-5'-P-CCNC: 23 U/L (ref 10–44)
ANION GAP SERPL CALC-SCNC: 10 MMOL/L (ref 8–16)
AST SERPL-CCNC: 17 U/L (ref 10–40)
BASOPHILS # BLD AUTO: 0.05 K/UL (ref 0–0.2)
BASOPHILS NFR BLD: 0.4 % (ref 0–1.9)
BILIRUB SERPL-MCNC: 0.8 MG/DL (ref 0.1–1)
BUN SERPL-MCNC: 9 MG/DL (ref 6–20)
CALCIUM SERPL-MCNC: 9.8 MG/DL (ref 8.7–10.5)
CHLORIDE SERPL-SCNC: 103 MMOL/L (ref 95–110)
CO2 SERPL-SCNC: 26 MMOL/L (ref 23–29)
CREAT SERPL-MCNC: 1 MG/DL (ref 0.5–1.4)
DIFFERENTIAL METHOD: ABNORMAL
EOSINOPHIL # BLD AUTO: 0.3 K/UL (ref 0–0.5)
EOSINOPHIL NFR BLD: 2.5 % (ref 0–8)
ERYTHROCYTE [DISTWIDTH] IN BLOOD BY AUTOMATED COUNT: 15.5 % (ref 11.5–14.5)
EST. GFR  (AFRICAN AMERICAN): >60 ML/MIN/1.73 M^2
EST. GFR  (NON AFRICAN AMERICAN): >60 ML/MIN/1.73 M^2
GLUCOSE SERPL-MCNC: 96 MG/DL (ref 70–110)
HCT VFR BLD AUTO: 40.5 % (ref 37–48.5)
HGB BLD-MCNC: 12.7 G/DL (ref 12–16)
IMM GRANULOCYTES # BLD AUTO: 0.04 K/UL (ref 0–0.04)
IMM GRANULOCYTES NFR BLD AUTO: 0.3 % (ref 0–0.5)
LYMPHOCYTES # BLD AUTO: 2.4 K/UL (ref 1–4.8)
LYMPHOCYTES NFR BLD: 19.1 % (ref 18–48)
MCH RBC QN AUTO: 26.5 PG (ref 27–31)
MCHC RBC AUTO-ENTMCNC: 31.4 G/DL (ref 32–36)
MCV RBC AUTO: 85 FL (ref 82–98)
MONOCYTES # BLD AUTO: 0.7 K/UL (ref 0.3–1)
MONOCYTES NFR BLD: 5.4 % (ref 4–15)
NEUTROPHILS # BLD AUTO: 8.9 K/UL (ref 1.8–7.7)
NEUTROPHILS NFR BLD: 72.3 % (ref 38–73)
NRBC BLD-RTO: 0 /100 WBC
PLATELET # BLD AUTO: 288 K/UL (ref 150–450)
PMV BLD AUTO: 9.5 FL (ref 9.2–12.9)
POTASSIUM SERPL-SCNC: 3.3 MMOL/L (ref 3.5–5.1)
PROT SERPL-MCNC: 7.8 G/DL (ref 6–8.4)
RBC # BLD AUTO: 4.79 M/UL (ref 4–5.4)
SODIUM SERPL-SCNC: 139 MMOL/L (ref 136–145)
WBC # BLD AUTO: 12.32 K/UL (ref 3.9–12.7)

## 2021-11-10 PROCEDURE — 80053 COMPREHEN METABOLIC PANEL: CPT | Performed by: INTERNAL MEDICINE

## 2021-11-10 PROCEDURE — 84466 ASSAY OF TRANSFERRIN: CPT | Performed by: INTERNAL MEDICINE

## 2021-11-10 PROCEDURE — 99214 PR OFFICE/OUTPT VISIT, EST, LEVL IV, 30-39 MIN: ICD-10-PCS | Mod: S$PBB,,, | Performed by: INTERNAL MEDICINE

## 2021-11-10 PROCEDURE — 99999 PR PBB SHADOW E&M-EST. PATIENT-LVL IV: ICD-10-PCS | Mod: PBBFAC,,, | Performed by: INTERNAL MEDICINE

## 2021-11-10 PROCEDURE — 36415 COLL VENOUS BLD VENIPUNCTURE: CPT | Performed by: INTERNAL MEDICINE

## 2021-11-10 PROCEDURE — 99214 OFFICE O/P EST MOD 30 MIN: CPT | Mod: PBBFAC | Performed by: INTERNAL MEDICINE

## 2021-11-10 PROCEDURE — 99214 OFFICE O/P EST MOD 30 MIN: CPT | Mod: S$PBB,,, | Performed by: INTERNAL MEDICINE

## 2021-11-10 PROCEDURE — 85025 COMPLETE CBC W/AUTO DIFF WBC: CPT | Performed by: INTERNAL MEDICINE

## 2021-11-10 PROCEDURE — 99999 PR PBB SHADOW E&M-EST. PATIENT-LVL IV: CPT | Mod: PBBFAC,,, | Performed by: INTERNAL MEDICINE

## 2021-11-10 PROCEDURE — 82728 ASSAY OF FERRITIN: CPT | Performed by: INTERNAL MEDICINE

## 2021-11-11 ENCOUNTER — PATIENT MESSAGE (OUTPATIENT)
Dept: PAIN MEDICINE | Facility: CLINIC | Age: 56
End: 2021-11-11
Payer: MEDICAID

## 2021-11-11 DIAGNOSIS — G47.01 INSOMNIA SECONDARY TO CHRONIC PAIN: ICD-10-CM

## 2021-11-11 DIAGNOSIS — M79.18 LUMBAR MUSCLE PAIN: ICD-10-CM

## 2021-11-11 DIAGNOSIS — G89.29 INSOMNIA SECONDARY TO CHRONIC PAIN: ICD-10-CM

## 2021-11-11 LAB
FERRITIN SERPL-MCNC: 219 NG/ML (ref 20–300)
IRON SERPL-MCNC: 67 UG/DL (ref 30–160)
SATURATED IRON: 21 % (ref 20–50)
TOTAL IRON BINDING CAPACITY: 317 UG/DL (ref 250–450)
TRANSFERRIN SERPL-MCNC: 214 MG/DL (ref 200–375)

## 2021-11-11 RX ORDER — CYCLOBENZAPRINE HCL 10 MG
TABLET ORAL
Qty: 90 TABLET | Refills: 1 | OUTPATIENT
Start: 2021-11-11 | End: 2021-11-20

## 2021-11-11 RX ORDER — AMITRIPTYLINE HYDROCHLORIDE 10 MG/1
10 TABLET, FILM COATED ORAL NIGHTLY PRN
Qty: 30 TABLET | Refills: 1 | Status: SHIPPED | OUTPATIENT
Start: 2021-11-11 | End: 2022-01-05

## 2021-11-18 ENCOUNTER — PATIENT OUTREACH (OUTPATIENT)
Dept: ADMINISTRATIVE | Facility: OTHER | Age: 56
End: 2021-11-18
Payer: MEDICAID

## 2021-11-19 ENCOUNTER — TELEPHONE (OUTPATIENT)
Dept: PAIN MEDICINE | Facility: CLINIC | Age: 56
End: 2021-11-19
Payer: MEDICAID

## 2021-11-19 ENCOUNTER — PATIENT MESSAGE (OUTPATIENT)
Dept: INTERNAL MEDICINE | Facility: CLINIC | Age: 56
End: 2021-11-19
Payer: MEDICAID

## 2021-11-19 ENCOUNTER — HOSPITAL ENCOUNTER (EMERGENCY)
Facility: HOSPITAL | Age: 56
Discharge: HOME | End: 2021-11-20
Attending: FAMILY MEDICINE
Payer: MEDICAID

## 2021-11-19 DIAGNOSIS — M54.2 NECK PAIN: ICD-10-CM

## 2021-11-19 DIAGNOSIS — M50.122 CERVICAL DISC DISORDER AT C5-C6 LEVEL WITH RADICULOPATHY: Primary | ICD-10-CM

## 2021-11-19 PROCEDURE — 99284 EMERGENCY DEPT VISIT MOD MDM: CPT | Mod: 25

## 2021-11-19 RX ORDER — MORPHINE SULFATE 4 MG/ML
4 INJECTION, SOLUTION INTRAMUSCULAR; INTRAVENOUS
Status: COMPLETED | OUTPATIENT
Start: 2021-11-19 | End: 2021-11-20

## 2021-11-20 VITALS
DIASTOLIC BLOOD PRESSURE: 69 MMHG | WEIGHT: 205 LBS | SYSTOLIC BLOOD PRESSURE: 135 MMHG | HEIGHT: 65 IN | HEART RATE: 79 BPM | OXYGEN SATURATION: 100 % | TEMPERATURE: 98 F | BODY MASS INDEX: 34.16 KG/M2 | RESPIRATION RATE: 17 BRPM

## 2021-11-20 PROCEDURE — 63600175 PHARM REV CODE 636 W HCPCS: Performed by: EMERGENCY MEDICINE

## 2021-11-20 PROCEDURE — 63600175 PHARM REV CODE 636 W HCPCS: Performed by: FAMILY MEDICINE

## 2021-11-20 RX ORDER — DEXAMETHASONE SODIUM PHOSPHATE 4 MG/ML
8 INJECTION, SOLUTION INTRA-ARTICULAR; INTRALESIONAL; INTRAMUSCULAR; INTRAVENOUS; SOFT TISSUE
Status: DISCONTINUED | OUTPATIENT
Start: 2021-11-20 | End: 2021-11-20

## 2021-11-20 RX ORDER — HYDROCODONE BITARTRATE AND ACETAMINOPHEN 5; 325 MG/1; MG/1
2 TABLET ORAL EVERY 6 HOURS PRN
Qty: 18 TABLET | Refills: 0 | Status: SHIPPED | OUTPATIENT
Start: 2021-11-20 | End: 2021-11-27

## 2021-11-20 RX ORDER — MORPHINE SULFATE 4 MG/ML
4 INJECTION, SOLUTION INTRAMUSCULAR; INTRAVENOUS
Status: COMPLETED | OUTPATIENT
Start: 2021-11-20 | End: 2021-11-20

## 2021-11-20 RX ORDER — ACETAMINOPHEN 500 MG
1000 TABLET ORAL
Status: DISCONTINUED | OUTPATIENT
Start: 2021-11-20 | End: 2021-11-20 | Stop reason: HOSPADM

## 2021-11-20 RX ADMIN — MORPHINE SULFATE 4 MG: 4 INJECTION INTRAVENOUS at 03:11

## 2021-11-20 RX ADMIN — MORPHINE SULFATE 4 MG: 4 INJECTION INTRAVENOUS at 12:11

## 2021-11-22 ENCOUNTER — OFFICE VISIT (OUTPATIENT)
Dept: PAIN MEDICINE | Facility: CLINIC | Age: 56
End: 2021-11-22
Payer: MEDICAID

## 2021-11-22 ENCOUNTER — PATIENT MESSAGE (OUTPATIENT)
Dept: INTERNAL MEDICINE | Facility: CLINIC | Age: 56
End: 2021-11-22
Payer: MEDICAID

## 2021-11-22 ENCOUNTER — TELEPHONE (OUTPATIENT)
Dept: INTERNAL MEDICINE | Facility: CLINIC | Age: 56
End: 2021-11-22
Payer: MEDICAID

## 2021-11-22 VITALS — RESPIRATION RATE: 17 BRPM | BODY MASS INDEX: 34.16 KG/M2 | WEIGHT: 205 LBS | HEIGHT: 65 IN

## 2021-11-22 DIAGNOSIS — M50.30 DDD (DEGENERATIVE DISC DISEASE), CERVICAL: Primary | ICD-10-CM

## 2021-11-22 DIAGNOSIS — M54.2 DISCOGENIC CERVICAL PAIN: ICD-10-CM

## 2021-11-22 DIAGNOSIS — M54.2 NECK PAIN: ICD-10-CM

## 2021-11-22 PROCEDURE — 99214 OFFICE O/P EST MOD 30 MIN: CPT | Mod: 95,,, | Performed by: PHYSICIAN ASSISTANT

## 2021-11-22 PROCEDURE — 99214 PR OFFICE/OUTPT VISIT, EST, LEVL IV, 30-39 MIN: ICD-10-PCS | Mod: 95,,, | Performed by: PHYSICIAN ASSISTANT

## 2021-11-23 ENCOUNTER — TELEPHONE (OUTPATIENT)
Dept: PAIN MEDICINE | Facility: CLINIC | Age: 56
End: 2021-11-23
Payer: MEDICAID

## 2021-11-23 ENCOUNTER — PATIENT MESSAGE (OUTPATIENT)
Dept: PAIN MEDICINE | Facility: CLINIC | Age: 56
End: 2021-11-23
Payer: MEDICAID

## 2021-11-24 ENCOUNTER — TELEPHONE (OUTPATIENT)
Dept: PAIN MEDICINE | Facility: CLINIC | Age: 56
End: 2021-11-24
Payer: MEDICAID

## 2021-11-24 ENCOUNTER — PATIENT MESSAGE (OUTPATIENT)
Dept: PAIN MEDICINE | Facility: CLINIC | Age: 56
End: 2021-11-24
Payer: MEDICAID

## 2021-11-29 ENCOUNTER — PATIENT MESSAGE (OUTPATIENT)
Dept: TRANSPLANT | Facility: CLINIC | Age: 56
End: 2021-11-29
Payer: MEDICAID

## 2021-11-29 ENCOUNTER — PATIENT MESSAGE (OUTPATIENT)
Dept: INTERNAL MEDICINE | Facility: CLINIC | Age: 56
End: 2021-11-29
Payer: MEDICAID

## 2021-12-01 DIAGNOSIS — K21.00 GASTROESOPHAGEAL REFLUX DISEASE WITH ESOPHAGITIS WITHOUT HEMORRHAGE: ICD-10-CM

## 2021-12-05 RX ORDER — FAMOTIDINE 20 MG/1
20 TABLET, FILM COATED ORAL 2 TIMES DAILY
Qty: 60 TABLET | Refills: 0 | Status: SHIPPED | OUTPATIENT
Start: 2021-12-05 | End: 2021-12-13 | Stop reason: SDUPTHER

## 2021-12-07 NOTE — PRE-PROCEDURE INSTRUCTIONS
Spoke with patient regarding procedure scheduled on 12.15     Arrival time 0600     Has patient been sick with fever or on antibiotics within the last 7 days? No     Does the patient have any open wounds, sores or rashes? No     Does the patient have any recent fractures? no     Has patient received a vaccination within the last 7 days? No     Received the COVID vaccination? yes     Has the patient stopped all medications as directed? na     Does patient have a pacemaker and or defibrillator? no     Does the patient have a ride to and from procedure and someone reliable to remain with patient? ingris     Is the patient diabetic? no     Does the patient have sleep apnea? Or use O2 at home? tien cpap     Is the patient receiving sedation? yes     Is the patient instructed to remain NPO beginning at midnight the night before their procedure? yes     Procedure location confirmed with patient? Yes     Covid- Denies signs/symptoms. Instructed to notify PAT/MD if any changes.

## 2021-12-13 ENCOUNTER — OFFICE VISIT (OUTPATIENT)
Dept: INTERNAL MEDICINE | Facility: CLINIC | Age: 56
End: 2021-12-13
Payer: MEDICAID

## 2021-12-13 VITALS
BODY MASS INDEX: 34.38 KG/M2 | WEIGHT: 206.38 LBS | SYSTOLIC BLOOD PRESSURE: 120 MMHG | HEART RATE: 100 BPM | HEIGHT: 65 IN | DIASTOLIC BLOOD PRESSURE: 70 MMHG | OXYGEN SATURATION: 96 % | TEMPERATURE: 98 F

## 2021-12-13 DIAGNOSIS — H91.93 DECREASED HEARING OF BOTH EARS: ICD-10-CM

## 2021-12-13 DIAGNOSIS — T50.2X5A DIURETIC-INDUCED HYPOKALEMIA: ICD-10-CM

## 2021-12-13 DIAGNOSIS — Z23 NEED FOR INFLUENZA VACCINATION: ICD-10-CM

## 2021-12-13 DIAGNOSIS — K21.00 GASTROESOPHAGEAL REFLUX DISEASE WITH ESOPHAGITIS WITHOUT HEMORRHAGE: ICD-10-CM

## 2021-12-13 DIAGNOSIS — Z94.4 LIVER REPLACED BY TRANSPLANT: ICD-10-CM

## 2021-12-13 DIAGNOSIS — G47.33 OSA (OBSTRUCTIVE SLEEP APNEA): ICD-10-CM

## 2021-12-13 DIAGNOSIS — Z79.899 LONG TERM CURRENT USE OF DIURETIC: Chronic | ICD-10-CM

## 2021-12-13 DIAGNOSIS — D50.0 IRON DEFICIENCY ANEMIA DUE TO CHRONIC BLOOD LOSS: ICD-10-CM

## 2021-12-13 DIAGNOSIS — D69.6 THROMBOCYTOPENIA: ICD-10-CM

## 2021-12-13 DIAGNOSIS — I10 ESSENTIAL HYPERTENSION: Primary | Chronic | ICD-10-CM

## 2021-12-13 DIAGNOSIS — E87.6 DIURETIC-INDUCED HYPOKALEMIA: ICD-10-CM

## 2021-12-13 DIAGNOSIS — E78.2 MIXED HYPERLIPIDEMIA: ICD-10-CM

## 2021-12-13 DIAGNOSIS — Z78.0 ASYMPTOMATIC MENOPAUSAL STATE: ICD-10-CM

## 2021-12-13 DIAGNOSIS — T17.308A CHOKING, INITIAL ENCOUNTER: ICD-10-CM

## 2021-12-13 DIAGNOSIS — E55.9 VITAMIN D DEFICIENCY: ICD-10-CM

## 2021-12-13 DIAGNOSIS — E04.2 MULTINODULAR THYROID: ICD-10-CM

## 2021-12-13 DIAGNOSIS — E66.09 CLASS 1 OBESITY DUE TO EXCESS CALORIES WITH SERIOUS COMORBIDITY AND BODY MASS INDEX (BMI) OF 34.0 TO 34.9 IN ADULT: ICD-10-CM

## 2021-12-13 LAB
25(OH)D3+25(OH)D2 SERPL-MCNC: 37 NG/ML (ref 30–96)
CHOLEST SERPL-MCNC: 128 MG/DL (ref 120–199)
CHOLEST/HDLC SERPL: 3.5 {RATIO} (ref 2–5)
HDLC SERPL-MCNC: 37 MG/DL (ref 40–75)
HDLC SERPL: 28.9 % (ref 20–50)
LDLC SERPL CALC-MCNC: 55.2 MG/DL (ref 63–159)
NONHDLC SERPL-MCNC: 91 MG/DL
POTASSIUM SERPL-SCNC: 3.4 MMOL/L (ref 3.5–5.1)
TRIGL SERPL-MCNC: 179 MG/DL (ref 30–150)

## 2021-12-13 PROCEDURE — 4010F PR ACE/ARB THEARPY RXD/TAKEN: ICD-10-PCS | Mod: CPTII,,, | Performed by: FAMILY MEDICINE

## 2021-12-13 PROCEDURE — 84132 ASSAY OF SERUM POTASSIUM: CPT | Performed by: FAMILY MEDICINE

## 2021-12-13 PROCEDURE — 99215 OFFICE O/P EST HI 40 MIN: CPT | Mod: PBBFAC | Performed by: FAMILY MEDICINE

## 2021-12-13 PROCEDURE — 82306 VITAMIN D 25 HYDROXY: CPT | Performed by: FAMILY MEDICINE

## 2021-12-13 PROCEDURE — 4010F ACE/ARB THERAPY RXD/TAKEN: CPT | Mod: CPTII,,, | Performed by: FAMILY MEDICINE

## 2021-12-13 PROCEDURE — 99214 PR OFFICE/OUTPT VISIT, EST, LEVL IV, 30-39 MIN: ICD-10-PCS | Mod: S$PBB,,, | Performed by: FAMILY MEDICINE

## 2021-12-13 PROCEDURE — 99214 OFFICE O/P EST MOD 30 MIN: CPT | Mod: S$PBB,,, | Performed by: FAMILY MEDICINE

## 2021-12-13 PROCEDURE — 99999 PR PBB SHADOW E&M-EST. PATIENT-LVL V: CPT | Mod: PBBFAC,,, | Performed by: FAMILY MEDICINE

## 2021-12-13 PROCEDURE — 80061 LIPID PANEL: CPT | Performed by: FAMILY MEDICINE

## 2021-12-13 PROCEDURE — 99999 PR PBB SHADOW E&M-EST. PATIENT-LVL V: ICD-10-PCS | Mod: PBBFAC,,, | Performed by: FAMILY MEDICINE

## 2021-12-13 RX ORDER — PANTOPRAZOLE SODIUM 40 MG/1
40 TABLET, DELAYED RELEASE ORAL DAILY
Qty: 90 TABLET | Refills: 3 | Status: SHIPPED | OUTPATIENT
Start: 2021-12-13 | End: 2022-06-16 | Stop reason: SDUPTHER

## 2021-12-13 RX ORDER — FAMOTIDINE 20 MG/1
20 TABLET, FILM COATED ORAL 2 TIMES DAILY
Qty: 90 TABLET | Refills: 3 | Status: SHIPPED | OUTPATIENT
Start: 2021-12-13 | End: 2022-06-16 | Stop reason: SDUPTHER

## 2021-12-13 RX ORDER — LOSARTAN POTASSIUM 25 MG/1
25 TABLET ORAL DAILY
Qty: 90 TABLET | Refills: 3 | Status: SHIPPED | OUTPATIENT
Start: 2021-12-13 | End: 2022-01-18

## 2021-12-13 RX ORDER — POTASSIUM CHLORIDE 750 MG/1
20 TABLET, EXTENDED RELEASE ORAL 2 TIMES DAILY
Qty: 360 TABLET | Refills: 2 | Status: SHIPPED | OUTPATIENT
Start: 2021-12-13 | End: 2022-03-13

## 2021-12-14 ENCOUNTER — CLINICAL SUPPORT (OUTPATIENT)
Dept: AUDIOLOGY | Facility: CLINIC | Age: 56
End: 2021-12-14
Payer: MEDICAID

## 2021-12-14 DIAGNOSIS — H90.3 SENSORY HEARING LOSS, BILATERAL: ICD-10-CM

## 2021-12-14 PROCEDURE — 99999 PR PBB SHADOW E&M-EST. PATIENT-LVL I: CPT | Mod: PBBFAC,,, | Performed by: AUDIOLOGIST-HEARING AID FITTER

## 2021-12-14 PROCEDURE — 92567 TYMPANOMETRY: CPT | Mod: PBBFAC | Performed by: AUDIOLOGIST-HEARING AID FITTER

## 2021-12-14 PROCEDURE — 92557 COMPREHENSIVE HEARING TEST: CPT | Mod: PBBFAC | Performed by: AUDIOLOGIST-HEARING AID FITTER

## 2021-12-14 PROCEDURE — 99211 OFF/OP EST MAY X REQ PHY/QHP: CPT | Mod: PBBFAC | Performed by: AUDIOLOGIST-HEARING AID FITTER

## 2021-12-14 PROCEDURE — 99999 PR PBB SHADOW E&M-EST. PATIENT-LVL I: ICD-10-PCS | Mod: PBBFAC,,, | Performed by: AUDIOLOGIST-HEARING AID FITTER

## 2021-12-19 RX ORDER — ERGOCALCIFEROL 1.25 MG/1
CAPSULE ORAL
Qty: 12 CAPSULE | Refills: 2 | Status: SHIPPED | OUTPATIENT
Start: 2021-12-19 | End: 2022-06-16 | Stop reason: SDUPTHER

## 2021-12-20 ENCOUNTER — PATIENT MESSAGE (OUTPATIENT)
Dept: AUDIOLOGY | Facility: CLINIC | Age: 56
End: 2021-12-20
Payer: MEDICAID

## 2021-12-28 ENCOUNTER — TELEPHONE (OUTPATIENT)
Dept: PAIN MEDICINE | Facility: CLINIC | Age: 56
End: 2021-12-28
Payer: MEDICAID

## 2021-12-29 ENCOUNTER — HOSPITAL ENCOUNTER (OUTPATIENT)
Dept: RADIOLOGY | Facility: HOSPITAL | Age: 56
Discharge: HOME | End: 2021-12-29
Attending: FAMILY MEDICINE
Payer: MEDICAID

## 2021-12-29 ENCOUNTER — TELEPHONE (OUTPATIENT)
Dept: OTOLARYNGOLOGY | Facility: CLINIC | Age: 56
End: 2021-12-29
Payer: MEDICAID

## 2021-12-29 DIAGNOSIS — E04.2 MULTIPLE THYROID NODULES: Primary | ICD-10-CM

## 2021-12-29 DIAGNOSIS — E04.2 MULTINODULAR THYROID: ICD-10-CM

## 2021-12-29 PROCEDURE — 76536 US EXAM OF HEAD AND NECK: CPT | Mod: 26,,, | Performed by: RADIOLOGY

## 2021-12-29 PROCEDURE — 76536 US EXAM OF HEAD AND NECK: CPT | Mod: TC

## 2021-12-29 PROCEDURE — 76536 US SOFT TISSUE HEAD NECK THYROID: ICD-10-PCS | Mod: 26,,, | Performed by: RADIOLOGY

## 2022-01-03 ENCOUNTER — PATIENT MESSAGE (OUTPATIENT)
Dept: TRANSPLANT | Facility: CLINIC | Age: 57
End: 2022-01-03
Payer: MEDICAID

## 2022-01-03 ENCOUNTER — PATIENT MESSAGE (OUTPATIENT)
Dept: PAIN MEDICINE | Facility: CLINIC | Age: 57
End: 2022-01-03
Payer: MEDICAID

## 2022-01-03 DIAGNOSIS — M54.16 BILATERAL LUMBAR RADICULOPATHY: ICD-10-CM

## 2022-01-03 DIAGNOSIS — Z94.4 LIVER REPLACED BY TRANSPLANT: ICD-10-CM

## 2022-01-03 DIAGNOSIS — G62.9 NEUROPATHY: ICD-10-CM

## 2022-01-03 DIAGNOSIS — E83.42 HYPOMAGNESEMIA: Primary | ICD-10-CM

## 2022-01-03 RX ORDER — GABAPENTIN 600 MG/1
600 TABLET ORAL 3 TIMES DAILY
Qty: 90 TABLET | Refills: 1 | Status: CANCELLED | OUTPATIENT
Start: 2022-01-03

## 2022-01-03 RX ORDER — CYCLOBENZAPRINE HCL 10 MG
TABLET ORAL
Qty: 90 TABLET | Refills: 0 | Status: CANCELLED | OUTPATIENT
Start: 2022-01-03

## 2022-01-03 RX ORDER — CYCLOBENZAPRINE HCL 10 MG
TABLET ORAL
COMMUNITY
Start: 2021-12-27 | End: 2022-01-04 | Stop reason: SDUPTHER

## 2022-01-03 RX ORDER — GABAPENTIN 300 MG/1
300 CAPSULE ORAL 3 TIMES DAILY
Qty: 120 CAPSULE | Refills: 1 | Status: CANCELLED | OUTPATIENT
Start: 2022-01-03

## 2022-01-03 NOTE — PRE-PROCEDURE INSTRUCTIONS
Spoke with patient regarding procedure scheduled on 1.4     Arrival time 0620     Has patient been sick with fever or on antibiotics within the last 7 days? No     Does the patient have any open wounds, sores or rashes? No     Does the patient have any recent fractures? no     Has patient received a vaccination within the last 7 days? No     Received the COVID vaccination? yes     Has the patient stopped all medications as directed? na     Does patient have a pacemaker and or defibrillator? no     Does the patient have a ride to and from procedure and someone reliable to remain with patient? daughter ryan     Is the patient diabetic? no     Does the patient have sleep apnea? Or use O2 at home? tien on cpap     Is the patient receiving sedation? yes     Is the patient instructed to remain NPO beginning at midnight the night before their procedure? yes     Procedure location confirmed with patient? Yes     Covid- Denies signs/symptoms. Instructed to notify PAT/MD if any changes.

## 2022-01-03 NOTE — PROGRESS NOTES
LOV 11/22/2021 with Emilee Buitrago PA-C   Procedure with Dr. Caballero 01/04/2022    Pt requesting refills be sent to the pharm of Gabapentin, and Flexeril    Please advise

## 2022-01-04 ENCOUNTER — HOSPITAL ENCOUNTER (OUTPATIENT)
Facility: HOSPITAL | Age: 57
Discharge: HOME | End: 2022-01-04
Attending: PHYSICAL MEDICINE & REHABILITATION | Admitting: PHYSICAL MEDICINE & REHABILITATION
Payer: MEDICAID

## 2022-01-04 VITALS
WEIGHT: 203.69 LBS | BODY MASS INDEX: 33.94 KG/M2 | HEART RATE: 98 BPM | DIASTOLIC BLOOD PRESSURE: 70 MMHG | HEIGHT: 65 IN | OXYGEN SATURATION: 98 % | SYSTOLIC BLOOD PRESSURE: 139 MMHG | RESPIRATION RATE: 18 BRPM | TEMPERATURE: 98 F

## 2022-01-04 DIAGNOSIS — G62.9 NEUROPATHY: ICD-10-CM

## 2022-01-04 DIAGNOSIS — M54.16 BILATERAL LUMBAR RADICULOPATHY: ICD-10-CM

## 2022-01-04 DIAGNOSIS — M54.12 CERVICAL RADICULOPATHY: Primary | ICD-10-CM

## 2022-01-04 PROCEDURE — 63600175 PHARM REV CODE 636 W HCPCS: Performed by: PHYSICAL MEDICINE & REHABILITATION

## 2022-01-04 PROCEDURE — 25000003 PHARM REV CODE 250: Performed by: PHYSICAL MEDICINE & REHABILITATION

## 2022-01-04 PROCEDURE — 25500020 PHARM REV CODE 255: Performed by: PHYSICAL MEDICINE & REHABILITATION

## 2022-01-04 PROCEDURE — 62321 NJX INTERLAMINAR CRV/THRC: CPT | Performed by: PHYSICAL MEDICINE & REHABILITATION

## 2022-01-04 PROCEDURE — 62321 PR INJ CERV/THORAC, W/GUIDANCE: ICD-10-PCS | Mod: ,,, | Performed by: PHYSICAL MEDICINE & REHABILITATION

## 2022-01-04 PROCEDURE — 62321 NJX INTERLAMINAR CRV/THRC: CPT | Mod: ,,, | Performed by: PHYSICAL MEDICINE & REHABILITATION

## 2022-01-04 RX ORDER — CYCLOBENZAPRINE HCL 10 MG
TABLET ORAL
Qty: 90 TABLET | Refills: 1 | Status: SHIPPED | OUTPATIENT
Start: 2022-01-04 | End: 2022-02-01 | Stop reason: SDUPTHER

## 2022-01-04 RX ORDER — GABAPENTIN 300 MG/1
300 CAPSULE ORAL 3 TIMES DAILY
Qty: 120 CAPSULE | Refills: 1 | Status: SHIPPED | OUTPATIENT
Start: 2022-01-04 | End: 2022-02-01 | Stop reason: SDUPTHER

## 2022-01-04 RX ORDER — DEXAMETHASONE SODIUM PHOSPHATE 10 MG/ML
INJECTION INTRAMUSCULAR; INTRAVENOUS
Status: DISCONTINUED | OUTPATIENT
Start: 2022-01-04 | End: 2022-01-04 | Stop reason: HOSPADM

## 2022-01-04 RX ORDER — BUPIVACAINE HYDROCHLORIDE 2.5 MG/ML
INJECTION, SOLUTION EPIDURAL; INFILTRATION; INTRACAUDAL
Status: DISCONTINUED | OUTPATIENT
Start: 2022-01-04 | End: 2022-01-04 | Stop reason: HOSPADM

## 2022-01-04 RX ORDER — FENTANYL CITRATE 50 UG/ML
INJECTION, SOLUTION INTRAMUSCULAR; INTRAVENOUS
Status: DISCONTINUED | OUTPATIENT
Start: 2022-01-04 | End: 2022-01-04 | Stop reason: HOSPADM

## 2022-01-04 RX ORDER — GABAPENTIN 600 MG/1
600 TABLET ORAL 3 TIMES DAILY
Qty: 90 TABLET | Refills: 1 | Status: SHIPPED | OUTPATIENT
Start: 2022-01-04 | End: 2022-02-01 | Stop reason: SDUPTHER

## 2022-01-04 RX ORDER — ONDANSETRON 2 MG/ML
4 INJECTION INTRAMUSCULAR; INTRAVENOUS ONCE AS NEEDED
Status: DISCONTINUED | OUTPATIENT
Start: 2022-01-04 | End: 2024-03-12

## 2022-01-04 RX ORDER — MIDAZOLAM HYDROCHLORIDE 1 MG/ML
INJECTION, SOLUTION INTRAMUSCULAR; INTRAVENOUS
Status: DISCONTINUED | OUTPATIENT
Start: 2022-01-04 | End: 2022-01-04 | Stop reason: HOSPADM

## 2022-01-04 NOTE — DISCHARGE SUMMARY
Discharge Note  Short Stay      SUMMARY     Admit Date: 1/4/2022    Attending Physician: Ras Caballero MD        Discharge Physician: Ras Caballero MD        Discharge Date: 1/4/2022 7:44 AM    Procedure(s) (LRB):  C5/6 IL WILBERTO (N/A)    Final Diagnosis: DDD (degenerative disc disease), cervical [M50.30]  Discogenic cervical pain [M54.2]    Disposition: Home or self care    Patient Instructions:   Current Discharge Medication List      CONTINUE these medications which have NOT CHANGED    Details   amitriptyline (ELAVIL) 10 MG tablet Take 1 tablet (10 mg total) by mouth nightly as needed for Insomnia or Pain.  Qty: 30 tablet, Refills: 1    Associated Diagnoses: Insomnia secondary to chronic pain      atorvastatin (LIPITOR) 40 MG tablet TAKE 1 TABLET(40 MG) BY MOUTH EVERY EVENING  Qty: 90 tablet, Refills: 0    Associated Diagnoses: Mixed hyperlipidemia      cyclobenzaprine (FLEXERIL) 10 MG tablet TAKE 1/2 TO 1 TABLET BY MOUTH THREE TIMES DAILY AS NEEDED FOR MUSCLE SPASMS      ergocalciferol (ERGOCALCIFEROL) 50,000 unit Cap TAKE 1 CAPSULE BY MOUTH EVERY 7 DAYS  Qty: 12 capsule, Refills: 2    Associated Diagnoses: Vitamin D deficiency      famotidine (PEPCID) 20 MG tablet Take 1 tablet (20 mg total) by mouth 2 (two) times a day.  Qty: 90 tablet, Refills: 3    Comments: DISCONTINUE any prescription for this drug issued prior to 12/13/2021.  Associated Diagnoses: Gastroesophageal reflux disease with esophagitis without hemorrhage      furosemide (LASIX) 40 MG tablet TAKE 1 TABLET(40 MG) BY MOUTH EVERY DAY  Qty: 30 tablet, Refills: 1    Associated Diagnoses: Prophylactic immunotherapy; Bilateral leg edema      gabapentin (NEURONTIN) 300 MG capsule Take 1 capsule (300 mg total) by mouth 3 (three) times daily. With 600mg cap to equal 900mg TID  Qty: 120 capsule, Refills: 1    Comments: Dr. Flaco Frazier - [facility]AX[facility]# [id number]  Associated Diagnoses: Bilateral lumbar radiculopathy; Neuropathy      gabapentin (NEURONTIN) 600 MG  tablet Take 1 tablet (600 mg total) by mouth 3 (three) times daily. With 300mg cap to equal 900mg TID  Qty: 90 tablet, Refills: 1    Comments: Dr. Flaco Frazier - [facility]# [id number]  Associated Diagnoses: Bilateral lumbar radiculopathy      losartan (COZAAR) 25 MG tablet Take 1 tablet (25 mg total) by mouth once daily.  Qty: 90 tablet, Refills: 3    Comments: DISCONTINUE any prescription for this drug issued prior to 12/13/2021.  Associated Diagnoses: Essential hypertension      magnesium oxide (MAG-OX) 400 mg (241.3 mg magnesium) tablet Take 1 tablet (400 mg total) by mouth 2 (two) times daily.  Qty: 60 tablet, Refills: 2    Associated Diagnoses: Hypomagnesemia      NIFEdipine (PROCARDIA-XL) 60 MG (OSM) 24 hr tablet TAKE 1 TABLET(60 MG) BY MOUTH EVERY EVENING  Qty: 90 tablet, Refills: 0    Associated Diagnoses: Essential hypertension      pantoprazole (PROTONIX) 40 MG tablet Take 1 tablet (40 mg total) by mouth once daily.  Qty: 90 tablet, Refills: 3    Comments: DISCONTINUE any prescription for this drug issued prior to 12/13/2021.  Associated Diagnoses: Gastroesophageal reflux disease with esophagitis without hemorrhage      potassium chloride SA (K-DUR,KLOR-CON) 10 MEQ tablet Take 2 tablets (20 mEq total) by mouth 2 (two) times daily.  Qty: 360 tablet, Refills: 2    Comments: DISCONTINUE any prescription for this drug issued prior to 12/13/2021.  Associated Diagnoses: Diuretic-induced hypokalemia      tacrolimus (PROGRAF) 1 MG Cap Take 2 capsules (2 mg total) by mouth every morning AND 1 capsule (1 mg total) every evening.  Qty: 90 capsule, Refills: 11    Comments: Txp Date:12/26/2017 (Liver) Disch Date:1/5/2018 ICD10:Z94.4 Txp Location:[facility]  Associated Diagnoses: Liver replaced by transplant      diclofenac sodium (VOLTAREN) 1 % Gel Apply 2 g topically 2 (two) times daily.  Qty: 100 g, Refills: 0      !! varicella-zoster gE-AS01B, PF, (SHINGRIX) 50 mcg/0.5 mL injection Inject 0.5 mL (one dose) into muscle now; give  second dose at least 2 months later  Qty: 1 each, Refills: 1    Associated Diagnoses: Need for shingles vaccine      !! varicella-zoster gE-AS01B, PF, (SHINGRIX, PF,) 50 mcg/0.5 mL injection Inject into the muscle as directed  Qty: 1 each, Refills: 0       !! - Potential duplicate medications found. Please discuss with provider.              Discharge Diagnosis: DDD (degenerative disc disease), cervical [M50.30]  Discogenic cervical pain [M54.2]  Condition on Discharge: Stable with no complications to procedure   Diet on Discharge: Same as before.  Activity: as per instruction sheet.  Discharge to: Home with a responsible adult.  Follow up: 2-4 weeks       Please call the office if you experience any weakness or loss of sensation, fever > 101.5, pain uncontrolled with oral medications, persistent nausea/vomiting/or diarrhea, redness or drainage from the incisions, or any other worrisome concerns. If physician on call was not reached or could not communicate with our office for any reason please go to the nearest emergency department

## 2022-01-04 NOTE — DISCHARGE INSTRUCTIONS

## 2022-01-04 NOTE — OP NOTE
Cervical Interlaminar Epidural Steroid Injection under Fluoroscopic Guidance.   Time-out taken to identify patient and procedure prior to starting the procedure.     Date of Procedure: 01/04/2022    PROCEDURE: Cervical Interlaminar epidural steroid injection C5-6 under fluoroscopic guidance.     Pre-Op diagnosis: DDD (degenerative disc disease), cervical [M50.30]  Discogenic cervical pain [M54.2]    Post-Op diagnosis: DDD (degenerative disc disease), cervical [M50.30]  Discogenic cervical pain [M54.2]    PHYSICIAN: Ras Caballero MD    ASSISTANTS: None     SEDATION: Conscious sedation provided by M.D    The patient was monitored with continuous pulse oximetry, EKG, and intermittent blood pressure monitors, immediately prior to administration of sedation.  The patient was hemodynamically stable throughout the entire process was responsive to voice, and breathing spontaneously.  Supplemental O2 was provided at 2L/min via nasal cannula.  Patient was comfortable for the duration of the procedure.     There was a total of 2mg IV Midazolam and 100mcg Fentanyl titrated for the procedure    Total sedation time was <10 minutes      MEDICATIONS INJECTED: Preservative-free Decadron 10 mg with 1mL of sterile 0.25%Bupivicaine and 3ml of preservative free normal saline.     LOCAL ANESTHETIC INJECTED: Xylocaine 1% 3mL    ESTIMATED BLOOD LOSS: none.     COMPLICATIONS: none.     TECHNIQUE: With the patient laying in a prone position, the area was prepped and draped in the usual sterile fashion using ChloraPrep and a fenestrated drape. Local anesthetic was given using a 27-gauge needle by raising a wheal and going down to the hub of the needle over the C5-6 interlaminar space.  The interlaminar space was then approached with a 3.5 inch 18-gauge Touhy needle was introduced under fluoroscopic guidance in the AP and Lateral view. Once the Ligamentum flavum was encountered loss of resistance to saline was used to enter the epidural  space. With positive loss of resistance and negative CSF or Blood, 2mL contrast dye Omnipaque (300mg/ml) was injected to confirm placement and there was no vascular runoff. The medication was then injected slowly. The patient tolerated the procedure well.       The patient was monitored after the procedure.   They were given post-procedure and discharge instructions to follow at home.  The patient was discharged in a stable condition.

## 2022-01-04 NOTE — H&P
HPI  Patient presenting for Procedure(s) (LRB):  C5/6 IL WILBERTO (N/A)     Patient on Anti-coagulation No    No health changes since previous encounter    Past Medical History:   Diagnosis Date    Alcohol abuse     Alcoholic hepatitis     Anemia of chronic disease     Arthritis     generialized    Cancer 12/26/2017    liver    Coagulopathy     Dyspnea on exertion 3/26/2020    Encounter for blood transfusion     End stage liver disease     History of alcohol abuse     Hyperlipidemia     Hypersomnia 9/11/2020    Hypertension     Hyponatremia     Liver cirrhosis     Liver transplant candidate 12/26/2017    Obesity (BMI 30-39.9) 1/5/2016    Sleep apnea      Past Surgical History:   Procedure Laterality Date    BREAST BIOPSY Left     benign    BREAST LUMPECTOMY      patient is unsure of date or laterality    CHOLECYSTECTOMY      COLONOSCOPY N/A 12/1/2017    Procedure: COLONOSCOPY;  Surgeon: Filemon Fuentes MD;  Location: [facility] ([address] [place] [address]);  Service: Endoscopy;  Laterality: N/A;    COLONOSCOPY N/A 12/5/2017    Procedure: COLONOSCOPY;  Surgeon: José Chavira MD;  Location: [facility] ([address] [place] [address]);  Service: Endoscopy;  Laterality: N/A;    EXAMINATION UNDER ANESTHESIA N/A 2/18/2020    Procedure: EXAM UNDER ANESTHESIA;  Surgeon: Alden Horowitz MD;  Location: [facility] OR;  Service: General;  Laterality: N/A;    EXCISIONAL HEMORRHOIDECTOMY N/A 2/18/2020    Procedure: HEMORRHOIDECTOMY;  Surgeon: Alden Horowitz MD;  Location: [facility] OR;  Service: General;  Laterality: N/A;    HYSTERECTOMY      complete     INJECTION OF ANESTHETIC AGENT AROUND PUDENDAL NERVE N/A 2/18/2020    Procedure: BLOCK, NERVE, PUDENDAL;  Surgeon: Alden Horowitz MD;  Location: [facility] OR;  Service: General;  Laterality: N/A;    INJECTION OF ANESTHETIC AGENT INTO SACROILIAC JOINT Right 6/14/2019    Procedure: right Sacroiliac Joint Injection;  Surgeon: David Desai MD;  Location: [facility];  Service: Pain Management;   Laterality: Right;    INJECTION OF ANESTHETIC AGENT INTO SACROILIAC JOINT Bilateral 2/7/2020    Procedure: Bilateral GT bursa + Bilateral Sacroiliac Joint Injection;  Surgeon: David Desai MD;  Location: [facility]IN [facility];  Service: Pain Management;  Laterality: Bilateral;    INJECTION OF ANESTHETIC AGENT INTO SACROILIAC JOINT Bilateral 7/7/2020    Procedure: Bilateral GT bursa +SIJ injection;  Surgeon: David Desai MD;  [facility];  Service: Pain Management;  Laterality: Bilateral;    INJECTION OF ANESTHETIC AGENT INTO SACROILIAC JOINT Right 10/21/2020    Procedure: Right piriformis + right SIJ + right GT bursa injection;  Surgeon: David Desai MD;  Location: [facility];  Service: Pain Management;  Laterality: Right;    INJECTION OF ANESTHETIC AGENT INTO SACROILIAC JOINT Bilateral 4/15/2021    Procedure: Bilateral SIJ + Bilateral Piriformis + Bilateral GT Bursa Injection;  Surgeon: Flaco Frazier MD;  Location: [facility];  Service: Pain Management;  Laterality: Bilateral;    INJECTION OF JOINT Bilateral 2/7/2020    Procedure: Bilateral GT bursa + Bilateral Sacroiliac Joint Injection;  Surgeon: David Desai MD;  [facility];  Service: Pain Management;  Laterality: Bilateral;    INJECTION OF JOINT Bilateral 7/7/2020    Procedure: Bilateral GT bursa +SIJ injection;  Surgeon: David Desai MD;  [facility];  Service: Pain Management;  Laterality: Bilateral;    INJECTION OF JOINT Right 10/21/2020    Procedure: Right piriformis + right SIJ + right GT bursa injection;  Surgeon: David Desai MD;  [facility];  Service: Pain Management;  Laterality: Right;    INJECTION OF JOINT Bilateral 2/8/2021    Procedure: Bilateral GT Bursa Injection;  Surgeon: Anna Kaminski MD;  Location: [facility];  Service: Pain Management;  Laterality: Bilateral;    INJECTION OF JOINT Bilateral 4/15/2021    Procedure: Bilateral SIJ + Bilateral Piriformis +  Bilateral GT Bursa Injection;  Surgeon: Flcao Frazier MD;  Location: [facility]GT;  Service: Pain Management;  Laterality: Bilateral;    INJECTION OF PIRIFORMIS MUSCLE Right 6/14/2019    Procedure: Right piriformis injection;  Surgeon: David Desai MD;  Location: [facility];  Service: Pain Management;  Laterality: Right;    INJECTION OF PIRIFORMIS MUSCLE Right 10/21/2020    Procedure: Right piriformis + right SIJ + right GT bursa injection;  Surgeon: David Desai MD;  Location: [facility];  Service: Pain Management;  Laterality: Right;    INJECTION OF PIRIFORMIS MUSCLE Bilateral 4/15/2021    Procedure: Bilateral SIJ + Bilateral Piriformis + Bilateral GT Bursa Injection;  Surgeon: Flaco Frazier MD;  Location: [facility];  Service: Pain Management;  Laterality: Bilateral;    LIVER TRANSPLANT  12/2017    SELECTIVE INJECTION OF ANESTHETIC AGENT AROUND LUMBAR SPINAL NERVE ROOT BY TRANSFORAMINAL APPROACH Bilateral 10/28/2021    Procedure: BLOCK, SPINAL NERVE ROOT, LUMBAR, SELECTIVE, TRANSFORAMINAL APPROACH bilateral L4-5 and Right Knee injection with RN IV sedation;  Surgeon: Flaco Frazier MD;  Location: [facility];  Service: Pain Management;  Laterality: Bilateral;     Review of patient's allergies indicates:   Allergen Reactions    Ferrous sulfate Other (See Comments)     Patient states the pill makes her sick. She stated she would rather have a shot        No current facility-administered medications on file prior to encounter.     Current Outpatient Medications on File Prior to Encounter   Medication Sig Dispense Refill    amitriptyline (ELAVIL) 10 MG tablet Take 1 tablet (10 mg total) by mouth nightly as needed for Insomnia or Pain. 30 tablet 1    atorvastatin (LIPITOR) 40 MG tablet TAKE 1 TABLET(40 MG) BY MOUTH EVERY EVENING 90 tablet 0    furosemide (LASIX) 40 MG tablet TAKE 1 TABLET(40 MG) BY MOUTH EVERY DAY 30 tablet 1    gabapentin (NEURONTIN) 300 MG capsule Take 1 capsule (300 mg  "total) by mouth 3 (three) times daily. With 600mg cap to equal 900mg  capsule 1    gabapentin (NEURONTIN) 600 MG tablet Take 1 tablet (600 mg total) by mouth 3 (three) times daily. With 300mg cap to equal 900mg TID 90 tablet 1    NIFEdipine (PROCARDIA-XL) 60 MG (OSM) 24 hr tablet TAKE 1 TABLET(60 MG) BY MOUTH EVERY EVENING 90 tablet 0    tacrolimus (PROGRAF) 1 MG Cap Take 2 capsules (2 mg total) by mouth every morning AND 1 capsule (1 mg total) every evening. 90 capsule 11    diclofenac sodium (VOLTAREN) 1 % Gel Apply 2 g topically 2 (two) times daily. 100 g 0    varicella-zoster gE-AS01B, PF, (SHINGRIX) 50 mcg/0.5 mL injection Inject 0.5 mL (one dose) into muscle now; give second dose at least 2 months later 1 each 1    varicella-zoster gE-AS01B, PF, (SHINGRIX, PF,) 50 mcg/0.5 mL injection Inject into the muscle as directed 1 each 0        PMHx, PSHx, Allergies, Medications reviewed in epic    ROS negative except pain complaints in HPI    OBJECTIVE:    BP (!) 142/69 (BP Location: Right arm, Patient Position: Sitting)   Pulse 97   Temp 97.5 °F (36.4 °C) (Temporal)   Resp 18   Ht 5' 5" (1.651 m)   Wt 92.4 kg (203 lb 11.3 oz)   LMP 01/01/1985 (Approximate) Comment: 1985  SpO2 98%   Breastfeeding No   BMI 33.90 kg/m²     PHYSICAL EXAMINATION:    GENERAL: Well appearing, in no acute distress, alert and oriented x3.  PSYCH:  Mood and affect appropriate.  SKIN: Skin color, texture, turgor normal, no rashes or lesions which will impact the procedure.  CV: RRR with palpation of the radial artery.  PULM: No evidence of respiratory difficulty, symmetric chest rise. Clear to auscultation.  NEURO: Cranial nerves grossly intact.    Plan:    Proceed with procedure as planned Procedure(s) (LRB):  C5/6 IL WILBERTO (N/A)    Ras Caballero MD  01/04/2022            "

## 2022-01-08 ENCOUNTER — NURSE TRIAGE (OUTPATIENT)
Dept: ADMINISTRATIVE | Facility: CLINIC | Age: 57
End: 2022-01-08
Payer: MEDICAID

## 2022-01-08 NOTE — TELEPHONE ENCOUNTER
Spoke with liver transplant patinet she states was exposed to covid.  Current symptoms are dizziness, headache, moderate difficulty breathing 8/10 headache, loss of taste, and sore throat.  Current temp 98.9.  Symptoms started 2 days ago.  Advised patient to go to ER for further evaluation and have another adult drive.  Patient verbalized understanding.     Reason for Disposition   MODERATE difficulty breathing (e.g., speaks in phrases, SOB even at rest, pulse 100-120)    Additional Information   Negative: SEVERE difficulty breathing (e.g., struggling for each breath, speaks in single words)   Negative: Difficult to awaken or acting confused (e.g., disoriented, slurred speech)   Negative: Bluish (or gray) lips or face now   Negative: Shock suspected (e.g., cold/pale/clammy skin, too weak to stand, low BP, rapid pulse)   Negative: Sounds like a life-threatening emergency to the triager   Negative: SEVERE or constant chest pain or pressure (Exception: mild central chest pain, present only when coughing)    Protocols used: CORONAVIRUS (COVID-19) DIAGNOSED OR [id number]

## 2022-01-10 ENCOUNTER — TELEPHONE (OUTPATIENT)
Dept: TRANSPLANT | Facility: CLINIC | Age: 57
End: 2022-01-10
Payer: MEDICAID

## 2022-01-10 NOTE — SUBJECTIVE & OBJECTIVE
Interval History:     States has poor appetite. Otherwise feels about the same.    Current Facility-Administered Medications   Medication    0.9%  NaCl infusion (for blood administration)    GI cocktail (mylanta 30 mL, lidocaine 2 % viscous 10 mL, dicyclomine 10 mL) 50 mL    lactulose 20 gram/30 mL solution Soln 30 g    midodrine tablet 5 mg    ondansetron disintegrating tablet 4 mg    ondansetron injection 4 mg    pantoprazole EC tablet 40 mg    potassium bicarbonate disintegrating tablet 50 mEq    predniSONE tablet 5 mg    rifAXIMin tablet 550 mg    simethicone chewable tablet 80 mg    sodium chloride 0.9% flush 3 mL    sodium chloride 0.9% flush 3 mL    sodium chloride 0.9% flush 5 mL    zinc sulfate capsule 220 mg       Objective:     Vital Signs (Most Recent):  Temp: 98.5 °F (36.9 °C) (12/11/17 1605)  Pulse: 104 (12/11/17 1605)  Resp: 18 (12/11/17 1605)  BP: 118/74 (12/11/17 1605)  SpO2: 99 % (12/11/17 1605) Vital Signs (24h Range):  Temp:  [97.7 °F (36.5 °C)-98.5 °F (36.9 °C)] 98.5 °F (36.9 °C)  Pulse:  [] 104  Resp:  [16-18] 18  SpO2:  [95 %-100 %] 99 %  BP: ()/(49-74) 118/74     Weight: 69.6 kg (153 lb 7 oz) (12/05/17 1300)  Body mass index is 25.53 kg/m².    Physical Exam   Constitutional: She is oriented to person, place, and time. She appears ill. No distress.   Eyes: Scleral icterus is present.   Cardiovascular: Normal rate.  Exam reveals no friction rub.    Pulmonary/Chest: Effort normal. No respiratory distress.   Abdominal: Soft. Bowel sounds are normal. She exhibits distension. There is no tenderness. There is no rebound and no guarding.   Musculoskeletal: She exhibits edema.   Neurological: She is alert and oriented to person, place, and time.   Vitals reviewed.      MELD-Na score: 33 at 12/11/2017  4:43 AM  MELD score: 31 at 12/11/2017  4:43 AM  Calculated from:  Serum Creatinine: 0.8 mg/dL (Rounded to 1) at 12/11/2017  4:43 AM  Serum Sodium: 131 mmol/L at 12/11/2017   Referral received for consideration of patient for inpatient acute rehab with a possible discharge tomorrow  After reviewing pts case with [facility] physician at this time the determination is patient is not appropriate for [facility] and may benefit from slower paced therapies  Update provided to [facility]  4:43 AM  Total Bilirubin: 26.2 mg/dL at 12/11/2017  4:43 AM  INR(ratio): 3.1 at 12/11/2017  4:43 AM  Age: 52 years    Significant Labs:  CBC:   Recent Labs  Lab 12/11/17 0443   WBC 9.32   RBC 2.86*   HGB 8.7*   HCT 24.7*   PLT 72*     CMP:   Recent Labs  Lab 12/11/17 0443   GLU 71   CALCIUM 9.0   ALBUMIN 3.2*   PROT 5.6*   *   K 3.4*   CO2 29   CL 93*   BUN 11   CREATININE 0.8   ALKPHOS 148*   ALT 51*   AST 56*   BILITOT 26.2*     Coagulation:   Recent Labs  Lab 12/11/17 0443   INR 3.1*       Significant Imaging:  Labs: Reviewed

## 2022-01-10 NOTE — TELEPHONE ENCOUNTER
Writer returned patient call. Patient requested advice about her COVID symptoms. Patient given some advise on how to manage her symptoms while awaiting her results. Patient aware to head to ER if she has difficulty breathing but otherwise to hydrate well and take a nasal decongestant claritin and a nasal spray as well. Patient to contact us if symptoms worsen/          ----- Message from Yan Banks sent at 1/10/2022  4:51 PM CST -----  Regarding: coordinator  Pt states @ Home Covid test results are not available for 72 hrs and would like assistance ans she is not feeling well.    [phone] (M

## 2022-01-10 NOTE — TELEPHONE ENCOUNTER
Writer received the below communication, patient contacted today and states she has an apt this afternoon to be tested for COVID      e with liver transplant patinet she states was exposed to covid.  Current symptoms are dizziness, headache, moderate difficulty breathing 8/10 headache, loss of taste, and sore throat.  Current temp 98.9.  Symptoms started 2 days ago.  Advised patient to go to ER for further evaluation and have another adult drive.  Patient verbalized understanding.      Reason for Disposition   MODERATE difficulty breathing (e.g., speaks in phrases, SOB even at rest, pulse 100-120)    Additional Information   Negative: SEVERE difficulty breathing (e.g., struggling for each breath, speaks in single words)   Negative: Difficult to awaken or acting confused (e.g., disoriented, slurred speech)   Negative: Bluish (or gray) lips or face now   Negative: Shock suspected (e.g., cold/pale/clammy skin, too weak to stand, low BP, rapid pulse)   Negative: Sounds like a life-threatening emergency to the triager   Negative: SEVERE or constant chest pain or pressure (Exception: mild central chest pain, present only when coughing)    Protocols used: CORONAVIRUS (COVID-19) DIAGNOSED OR [id number]

## 2022-01-11 DIAGNOSIS — E78.2 MIXED HYPERLIPIDEMIA: ICD-10-CM

## 2022-01-12 NOTE — TELEPHONE ENCOUNTER
No new care gaps identified.  Powered by [email or web address]. Reference number: [id number].   1/11/2022 6:48:39 PM CST

## 2022-01-13 ENCOUNTER — PATIENT OUTREACH (OUTPATIENT)
Dept: ADMINISTRATIVE | Facility: OTHER | Age: 57
End: 2022-01-13
Payer: MEDICAID

## 2022-01-13 ENCOUNTER — TELEPHONE (OUTPATIENT)
Dept: OTOLARYNGOLOGY | Facility: CLINIC | Age: 57
End: 2022-01-13
Payer: MEDICAID

## 2022-01-13 NOTE — TELEPHONE ENCOUNTER
----- Message from Char Wakefield sent at 1/13/2022 12:23 PM CST -----  Pt would like to reschedule hearing aid appt.   Please call back at [phone].  Thx Md

## 2022-01-13 NOTE — PROGRESS NOTES
Health Maintenance Due   Topic Date Due    Influenza Vaccine (1) 09/01/2021     Updates were requested from care everywhere.  Chart was reviewed for overdue Proactive [facility] Encounters (ALANNA) topics (CRS, Breast Cancer Screening, Eye exam)  Health Maintenance has been updated.  LINKS immunization registry triggered.  Immunizations were reconciled.

## 2022-01-14 ENCOUNTER — OFFICE VISIT (OUTPATIENT)
Dept: PULMONOLOGY | Facility: CLINIC | Age: 57
End: 2022-01-14
Payer: MEDICAID

## 2022-01-14 ENCOUNTER — LAB VISIT (OUTPATIENT)
Dept: LAB | Facility: HOSPITAL | Age: 57
End: 2022-01-14
Attending: INTERNAL MEDICINE
Payer: MEDICAID

## 2022-01-14 VITALS
HEART RATE: 70 BPM | DIASTOLIC BLOOD PRESSURE: 82 MMHG | SYSTOLIC BLOOD PRESSURE: 126 MMHG | BODY MASS INDEX: 34.78 KG/M2 | RESPIRATION RATE: 18 BRPM | OXYGEN SATURATION: 98 % | HEIGHT: 65 IN | WEIGHT: 208.75 LBS

## 2022-01-14 DIAGNOSIS — Z94.4 LIVER REPLACED BY TRANSPLANT: ICD-10-CM

## 2022-01-14 DIAGNOSIS — E66.09 CLASS 1 OBESITY DUE TO EXCESS CALORIES WITH SERIOUS COMORBIDITY AND BODY MASS INDEX (BMI) OF 34.0 TO 34.9 IN ADULT: Primary | ICD-10-CM

## 2022-01-14 DIAGNOSIS — G47.33 OSA (OBSTRUCTIVE SLEEP APNEA): ICD-10-CM

## 2022-01-14 LAB
ALBUMIN SERPL BCP-MCNC: 3.7 G/DL (ref 3.5–5.2)
ALP SERPL-CCNC: 189 U/L (ref 55–135)
ALT SERPL W/O P-5'-P-CCNC: 16 U/L (ref 10–44)
ANION GAP SERPL CALC-SCNC: 9 MMOL/L (ref 8–16)
AST SERPL-CCNC: 12 U/L (ref 10–40)
BASOPHILS # BLD AUTO: 0.04 K/UL (ref 0–0.2)
BASOPHILS NFR BLD: 0.3 % (ref 0–1.9)
BILIRUB SERPL-MCNC: 0.4 MG/DL (ref 0.1–1)
BUN SERPL-MCNC: 12 MG/DL (ref 6–20)
CALCIUM SERPL-MCNC: 9.4 MG/DL (ref 8.7–10.5)
CHLORIDE SERPL-SCNC: 102 MMOL/L (ref 95–110)
CO2 SERPL-SCNC: 26 MMOL/L (ref 23–29)
CREAT SERPL-MCNC: 0.8 MG/DL (ref 0.5–1.4)
DIFFERENTIAL METHOD: ABNORMAL
EOSINOPHIL # BLD AUTO: 0.2 K/UL (ref 0–0.5)
EOSINOPHIL NFR BLD: 1.4 % (ref 0–8)
ERYTHROCYTE [DISTWIDTH] IN BLOOD BY AUTOMATED COUNT: 13.7 % (ref 11.5–14.5)
EST. GFR  (AFRICAN AMERICAN): >60 ML/MIN/1.73 M^2
EST. GFR  (NON AFRICAN AMERICAN): >60 ML/MIN/1.73 M^2
GLUCOSE SERPL-MCNC: 102 MG/DL (ref 70–110)
HCT VFR BLD AUTO: 40.6 % (ref 37–48.5)
HGB BLD-MCNC: 12.9 G/DL (ref 12–16)
IMM GRANULOCYTES # BLD AUTO: 0.12 K/UL (ref 0–0.04)
IMM GRANULOCYTES NFR BLD AUTO: 1 % (ref 0–0.5)
LYMPHOCYTES # BLD AUTO: 2.7 K/UL (ref 1–4.8)
LYMPHOCYTES NFR BLD: 21 % (ref 18–48)
MCH RBC QN AUTO: 27.7 PG (ref 27–31)
MCHC RBC AUTO-ENTMCNC: 31.8 G/DL (ref 32–36)
MCV RBC AUTO: 87 FL (ref 82–98)
MONOCYTES # BLD AUTO: 0.7 K/UL (ref 0.3–1)
MONOCYTES NFR BLD: 5.6 % (ref 4–15)
NEUTROPHILS # BLD AUTO: 8.9 K/UL (ref 1.8–7.7)
NEUTROPHILS NFR BLD: 70.7 % (ref 38–73)
NRBC BLD-RTO: 0 /100 WBC
PLATELET # BLD AUTO: 87 K/UL (ref 150–450)
PMV BLD AUTO: 11.3 FL (ref 9.2–12.9)
POTASSIUM SERPL-SCNC: 3.5 MMOL/L (ref 3.5–5.1)
PROT SERPL-MCNC: 7.5 G/DL (ref 6–8.4)
RBC # BLD AUTO: 4.66 M/UL (ref 4–5.4)
SODIUM SERPL-SCNC: 137 MMOL/L (ref 136–145)
WBC # BLD AUTO: 12.59 K/UL (ref 3.9–12.7)

## 2022-01-14 PROCEDURE — 3079F DIAST BP 80-89 MM HG: CPT | Mod: CPTII,,, | Performed by: NURSE PRACTITIONER

## 2022-01-14 PROCEDURE — 80197 ASSAY OF TACROLIMUS: CPT | Performed by: INTERNAL MEDICINE

## 2022-01-14 PROCEDURE — 1160F RVW MEDS BY RX/DR IN RCRD: CPT | Mod: CPTII,,, | Performed by: NURSE PRACTITIONER

## 2022-01-14 PROCEDURE — 99213 OFFICE O/P EST LOW 20 MIN: CPT | Mod: S$PBB,,, | Performed by: NURSE PRACTITIONER

## 2022-01-14 PROCEDURE — 99215 OFFICE O/P EST HI 40 MIN: CPT | Mod: PBBFAC | Performed by: NURSE PRACTITIONER

## 2022-01-14 PROCEDURE — 36415 COLL VENOUS BLD VENIPUNCTURE: CPT | Performed by: INTERNAL MEDICINE

## 2022-01-14 PROCEDURE — 3008F BODY MASS INDEX DOCD: CPT | Mod: CPTII,,, | Performed by: NURSE PRACTITIONER

## 2022-01-14 PROCEDURE — 80053 COMPREHEN METABOLIC PANEL: CPT | Performed by: INTERNAL MEDICINE

## 2022-01-14 PROCEDURE — 3074F PR MOST RECENT SYSTOLIC BLOOD PRESSURE < 130 MM HG: ICD-10-PCS | Mod: CPTII,,, | Performed by: NURSE PRACTITIONER

## 2022-01-14 PROCEDURE — 1159F MED LIST DOCD IN RCRD: CPT | Mod: CPTII,,, | Performed by: NURSE PRACTITIONER

## 2022-01-14 PROCEDURE — 1159F PR MEDICATION LIST DOCUMENTED IN MEDICAL RECORD: ICD-10-PCS | Mod: CPTII,,, | Performed by: NURSE PRACTITIONER

## 2022-01-14 PROCEDURE — 85025 COMPLETE CBC W/AUTO DIFF WBC: CPT | Performed by: INTERNAL MEDICINE

## 2022-01-14 PROCEDURE — 1160F PR REVIEW ALL MEDS BY PRESCRIBER/CLIN PHARMACIST DOCUMENTED: ICD-10-PCS | Mod: CPTII,,, | Performed by: NURSE PRACTITIONER

## 2022-01-14 PROCEDURE — 3074F SYST BP LT 130 MM HG: CPT | Mod: CPTII,,, | Performed by: NURSE PRACTITIONER

## 2022-01-14 PROCEDURE — 3008F PR BODY MASS INDEX (BMI) DOCUMENTED: ICD-10-PCS | Mod: CPTII,,, | Performed by: NURSE PRACTITIONER

## 2022-01-14 PROCEDURE — 99999 PR PBB SHADOW E&M-EST. PATIENT-LVL V: ICD-10-PCS | Mod: PBBFAC,,, | Performed by: NURSE PRACTITIONER

## 2022-01-14 PROCEDURE — 99999 PR PBB SHADOW E&M-EST. PATIENT-LVL V: CPT | Mod: PBBFAC,,, | Performed by: NURSE PRACTITIONER

## 2022-01-14 PROCEDURE — 99213 PR OFFICE/OUTPT VISIT, EST, LEVL III, 20-29 MIN: ICD-10-PCS | Mod: S$PBB,,, | Performed by: NURSE PRACTITIONER

## 2022-01-14 PROCEDURE — 3079F PR MOST RECENT DIASTOLIC BLOOD PRESSURE 80-89 MM HG: ICD-10-PCS | Mod: CPTII,,, | Performed by: NURSE PRACTITIONER

## 2022-01-14 RX ORDER — CHLORTHALIDONE 25 MG/1
TABLET ORAL
COMMUNITY
Start: 2022-01-12 | End: 2022-06-16

## 2022-01-14 NOTE — PROGRESS NOTES
"Subjective:      Patient ID: Marcie Bazan is a 56 y.o. female.    Chief Complaint: Sleep Apnea    HPI  Presents to office for review of AutoPAP therapy. Patient states improved symptoms with use of AutoPAP. Sleeping more soundly. Waking up feeling more refreshed. Improved daytime sleepiness. Patient states she is benefiting from use of the AutoPAP.     Patient Active Problem List   Diagnosis    Allergic rhinitis    Anemia of chronic disease    Erosive esophagitis    Essential hypertension    History of abnormal cervical Pap smear    History of Helicobacter pylori infection    Lipoma    Multinodular thyroid    Diuretic-induced hypokalemia    Hypomagnesemia    Thrombocytopenia    Liver replaced by transplant    Immunosuppression    Adrenal cortical steroids causing adverse effect in therapeutic use    At risk for opportunistic infections    Long-term use of immunosuppressant medication    Accelerated liver transplant rejection    Vitamin D deficiency    Mixed hyperlipidemia    Adhesive capsulitis of both shoulders    Piriformis syndrome of both sides    Spondylosis of lumbar region without myelopathy or radiculopathy    Class 1 obesity due to excess calories with serious comorbidity and body mass index (BMI) of 34.0 to 34.9 in adult    Sacroiliac joint dysfunction    Unintentional weight loss    Acute stress reaction    Hypersomnia    BRANNON (obstructive sleep apnea)    Gastroesophageal reflux disease with esophagitis without hemorrhage    Piriformis muscle pain    Long term current use of loop diuretic    Iron deficiency anemia    Iron deficiency anemia due to chronic blood loss    Decreased strength, endurance, and mobility    Knee pain, bilateral    Choking    Decreased hearing of both ears         /82   Pulse 70   Resp 18   Ht 5' 5" (1.651 m)   Wt 94.7 kg (208 lb 12.4 oz)   LMP 01/01/1985 (Approximate) Comment: 1985  SpO2 98%   BMI 34.74 kg/m²   Body mass index is " 34.74 kg/m².    Review of Systems   Constitutional: Negative.    HENT: Negative.    Respiratory: Negative.    Cardiovascular: Negative.    Musculoskeletal: Negative.    Gastrointestinal: Negative.    Neurological: Negative.    Psychiatric/Behavioral: Negative.      Objective:      Physical Exam  Constitutional:       Appearance: She is well-developed. She is obese.   HENT:      Head: Normocephalic and atraumatic.      Mouth/Throat:      Comments: Wearing mask due to covid concerns  Cardiovascular:      Rate and Rhythm: Normal rate and regular rhythm.      Heart sounds: No murmur heard.  No gallop.    Pulmonary:      Effort: Pulmonary effort is normal.      Breath sounds: Normal breath sounds.   Abdominal:      Palpations: Abdomen is soft. There is no mass.      Tenderness: There is no abdominal tenderness.   Musculoskeletal:         General: Normal range of motion.      Cervical back: Normal range of motion and neck supple.   Skin:     General: Skin is warm and dry.   Neurological:      Mental Status: She is alert and oriented to person, place, and time.   Psychiatric:         Mood and Affect: Mood normal.         Behavior: Behavior normal.       Personal Diagnostic Review  Download pap. Normal AHI, 40 % compliance over 30 days    Assessment:       1. Class 1 obesity due to excess calories with serious comorbidity and body mass index (BMI) of 34.0 to 34.9 in adult    2. BRANNON (obstructive sleep apnea)        Outpatient Encounter Medications as of 1/14/2022   Medication Sig Dispense Refill    amitriptyline (ELAVIL) 10 MG tablet TAKE 1 TABLET(10 MG) BY MOUTH EVERY NIGHT AS NEEDED FOR INSOMNIA OR PAIN 30 tablet 0    atorvastatin (LIPITOR) 40 MG tablet TAKE 1 TABLET(40 MG) BY MOUTH EVERY EVENING 90 tablet 0    chlorthalidone (HYGROTEN) 25 MG Tab       cyclobenzaprine (FLEXERIL) 10 MG tablet TAKE 1/2 TO 1 TABLET BY MOUTH THREE TIMES DAILY AS NEEDED FOR MUSCLE SPASMS 90 tablet 1    diclofenac sodium (VOLTAREN) 1 % Gel  Apply 2 g topically 2 (two) times daily. 100 g 0    ergocalciferol (ERGOCALCIFEROL) 50,000 unit Cap TAKE 1 CAPSULE BY MOUTH EVERY 7 DAYS 12 capsule 2    famotidine (PEPCID) 20 MG tablet Take 1 tablet (20 mg total) by mouth 2 (two) times a day. 90 tablet 3    furosemide (LASIX) 40 MG tablet TAKE 1 TABLET(40 MG) BY MOUTH EVERY DAY 30 tablet 1    gabapentin (NEURONTIN) 300 MG capsule Take 1 capsule (300 mg total) by mouth 3 (three) times daily. With 600mg cap to equal 900mg  capsule 1    gabapentin (NEURONTIN) 600 MG tablet Take 1 tablet (600 mg total) by mouth 3 (three) times daily. With 300mg cap to equal 900mg TID 90 tablet 1    losartan (COZAAR) 25 MG tablet Take 1 tablet (25 mg total) by mouth once daily. 90 tablet 3    magnesium oxide (MAG-OX) 400 mg (241.3 mg magnesium) tablet Take 1 tablet (400 mg total) by mouth 2 (two) times daily. 60 tablet 2    NIFEdipine (PROCARDIA-XL) 60 MG (OSM) 24 hr tablet TAKE 1 TABLET(60 MG) BY MOUTH EVERY EVENING 90 tablet 0    pantoprazole (PROTONIX) 40 MG tablet Take 1 tablet (40 mg total) by mouth once daily. 90 tablet 3    potassium chloride SA (K-DUR,KLOR-CON) 10 MEQ tablet Take 2 tablets (20 mEq total) by mouth 2 (two) times daily. 360 tablet 2    tacrolimus (PROGRAF) 1 MG Cap Take 2 capsules (2 mg total) by mouth every morning AND 1 capsule (1 mg total) every evening. 90 capsule 11    varicella-zoster gE-AS01B, PF, (SHINGRIX) 50 mcg/0.5 mL injection Inject 0.5 mL (one dose) into muscle now; give second dose at least 2 months later (Patient not taking: Reported on 1/14/2022) 1 each 1    varicella-zoster gE-AS01B, PF, (SHINGRIX, PF,) 50 mcg/0.5 mL injection Inject into the muscle as directed (Patient not taking: Reported on 1/14/2022) 1 each 0     Facility-Administered Encounter Medications as of 1/14/2022   Medication Dose Route Frequency Provider Last Rate Last Admin    ondansetron injection 4 mg  4 mg Intravenous Once PRN Ras Caballero MD          Orders Placed This Encounter   Procedures    CPAP/BIPAP SUPPLIES     Order Specific Question:   Length of need (1-99 months):     Answer:   99     Order Specific Question:   Choose ONE mask type and its corresponding cushions and/or pillows:     Answer:    Nasal Cushion Mask, 1 per 90 days:  Nasal Cushions, (6 per 90 days)     Order Specific Question:   Choose EITHER Heated or Non-Heated Tubjing     Answer:    Non-Heated Tubing, 1 per 90 days     Order Specific Question:   All other supplies as needed as listed below:     Answer:    Headgear, 1 per 180 days     Order Specific Question:   All other supplies as needed as listed below:     Answer:    Disposable Filter, 6 per 90 days     Order Specific Question:   All other supplies as needed as listed below:     Answer:    Non-Disposable Filter, 1 per 180 days     Order Specific Question:   All other supplies as needed as listed below:     Answer:    Humidifier Chamber, 1 per 180 days     Order Specific Question:   All other supplies as needed as listed below:     Answer:    Chin Strap, 1 per 180 days     Plan:        Problem List Items Addressed This Visit        Endocrine    Class 1 obesity due to excess calories with serious comorbidity and body mass index (BMI) of 34.0 to 34.9 in adult - Primary       Other    BRANNON (obstructive sleep apnea)    Relevant Orders    CPAP/BIPAP SUPPLIES

## 2022-01-15 LAB — TACROLIMUS BLD-MCNC: 4.7 NG/ML (ref 5–15)

## 2022-01-18 ENCOUNTER — OFFICE VISIT (OUTPATIENT)
Dept: OPHTHALMOLOGY | Facility: CLINIC | Age: 57
End: 2022-01-18
Payer: MEDICAID

## 2022-01-18 DIAGNOSIS — H25.013 CORTICAL AGE-RELATED CATARACT OF BOTH EYES: Primary | ICD-10-CM

## 2022-01-18 DIAGNOSIS — H04.123 DRY EYES, BILATERAL: ICD-10-CM

## 2022-01-18 DIAGNOSIS — H52.213 IRREGULAR ASTIGMATISM OF BOTH EYES: ICD-10-CM

## 2022-01-18 DIAGNOSIS — H52.4 PRESBYOPIA: ICD-10-CM

## 2022-01-18 PROCEDURE — 1160F PR REVIEW ALL MEDS BY PRESCRIBER/CLIN PHARMACIST DOCUMENTED: ICD-10-PCS | Mod: CPTII,,, | Performed by: OPTOMETRIST

## 2022-01-18 PROCEDURE — 1159F PR MEDICATION LIST DOCUMENTED IN MEDICAL RECORD: ICD-10-PCS | Mod: CPTII,,, | Performed by: OPTOMETRIST

## 2022-01-18 PROCEDURE — 99999 PR PBB SHADOW E&M-EST. PATIENT-LVL III: CPT | Mod: PBBFAC,,, | Performed by: OPTOMETRIST

## 2022-01-18 PROCEDURE — 99213 OFFICE O/P EST LOW 20 MIN: CPT | Mod: PBBFAC | Performed by: OPTOMETRIST

## 2022-01-18 PROCEDURE — 92014 COMPRE OPH EXAM EST PT 1/>: CPT | Mod: S$PBB,,, | Performed by: OPTOMETRIST

## 2022-01-18 PROCEDURE — 1160F RVW MEDS BY RX/DR IN RCRD: CPT | Mod: CPTII,,, | Performed by: OPTOMETRIST

## 2022-01-18 PROCEDURE — 4010F PR ACE/ARB THEARPY RXD/TAKEN: ICD-10-PCS | Mod: CPTII,,, | Performed by: OPTOMETRIST

## 2022-01-18 PROCEDURE — 4010F ACE/ARB THERAPY RXD/TAKEN: CPT | Mod: CPTII,,, | Performed by: OPTOMETRIST

## 2022-01-18 PROCEDURE — 1159F MED LIST DOCD IN RCRD: CPT | Mod: CPTII,,, | Performed by: OPTOMETRIST

## 2022-01-18 PROCEDURE — 99999 PR PBB SHADOW E&M-EST. PATIENT-LVL III: ICD-10-PCS | Mod: PBBFAC,,, | Performed by: OPTOMETRIST

## 2022-01-18 PROCEDURE — 92015 PR REFRACTION: ICD-10-PCS | Mod: ,,, | Performed by: OPTOMETRIST

## 2022-01-18 PROCEDURE — 92015 DETERMINE REFRACTIVE STATE: CPT | Mod: ,,, | Performed by: OPTOMETRIST

## 2022-01-18 PROCEDURE — 92014 PR EYE EXAM, EST PATIENT,COMPREHESV: ICD-10-PCS | Mod: S$PBB,,, | Performed by: OPTOMETRIST

## 2022-01-18 RX ORDER — ATORVASTATIN CALCIUM 40 MG/1
TABLET, FILM COATED ORAL
Qty: 90 TABLET | Refills: 3 | Status: SHIPPED | OUTPATIENT
Start: 2022-01-18 | End: 2022-05-10

## 2022-01-18 NOTE — TELEPHONE ENCOUNTER
Refill Authorization Note   Marcie Bazan  is requesting a refill authorization.  Brief Assessment and Rationale for Refill:  Approve     Medication Therapy Plan:       Medication Reconciliation Completed: No   Comments:   --->Care Gap information included below if applicable.   Orders Placed This Encounter    atorvastatin (LIPITOR) 40 MG tablet      Requested Prescriptions   Signed Prescriptions Disp Refills    atorvastatin (LIPITOR) 40 MG tablet 90 tablet 3     Sig: TAKE 1 TABLET(40 MG) BY MOUTH EVERY EVENING       Cardiovascular:  Antilipid - Statins Passed - 1/11/2022  6:47 PM        Passed - Patient is at least 18 years old        Passed - Valid encounter within last 15 months     Recent Visits  Date Type Provider Dept   12/13/21 Office Visit KATJA Munoz MD [facility]   11/03/20 Office Visit KATJA Munoz MD [facility]   09/11/20 Office Visit KATJA Munoz MD [facility]   07/29/20 Office Visit Katy Childress MD [facility]   03/25/20 Office Visit Jenny Suggs PA-C [facility]   Showing recent visits within past 720 days and meeting all other requirements  Future Appointments  No visits were found meeting these conditions.  Showing future appointments within next 150 days and meeting all other requirements      Future Appointments              In 1 week FLU, [facility], [facility]    In 1 week Joselin Souza MD [facility], [facility]    In 2 weeks JOHANNY Carranza - Interventional Pain,  Medical C    In 4 months KATJA Munoz MD [facility] Internal [facility] 2nd [facility]    In 1 year Elizabeth Lejeune, NP [facility] 3rd Fl, [facility]                Passed - ALT is 131 or below and within 360 days     ALT   Date Value Ref Range Status   01/14/2022 16 10 - 44 U/L Final   11/10/2021 23 10 - 44 U/L Final   10/12/2021 16 10 - 44 U/L Final              Passed -  AST is 119 or below and within 360 days     AST   Date Value Ref Range Status   01/14/2022 12 10 - 40 U/L Final   11/10/2021 17 10 - 40 U/L Final   10/12/2021 14 10 - 40 U/L Final              Passed - Total Cholesterol within 360 days     Lab Results   Component Value Date    CHOL 128 12/13/2021    CHOL 129 04/27/2020    CHOL 214 (H) 11/04/2019              Passed - LDL within 360 days     LDL Cholesterol   Date Value Ref Range Status   12/13/2021 55.2 (L) 63.0 - 159.0 mg/dL Final     Comment:     The National Cholesterol Education Program (NCEP) has set the  following guidelines (reference values) for LDL Cholesterol:  Optimal.......................<130 mg/dL  Borderline High...............130-159 mg/dL  High..........................160-189 mg/dL  Very High.....................>190 mg/dL              Passed - HDL within 360 days     HDL   Date Value Ref Range Status   12/13/2021 37 (L) 40 - 75 mg/dL Final     Comment:     The National Cholesterol Education Program (NCEP) has set the  following guidelines (reference values) for HDL Cholesterol:  Low...............<40 mg/dL  Optimal...........>60 mg/dL              Passed - Triglycerides within 360 days     Lab Results   Component Value Date    TRIG 179 (H) 12/13/2021    TRIG 128 04/27/2020    TRIG 78 11/04/2019                  Appointments  past 12m or future 3m with PCP    Date Provider   Last Visit   12/13/2021 KATJA Munoz MD   Next Visit   6/16/2022 KATJA Munoz MD   ED visits in past 90 days: 1     Note composed:5:42 PM 01/18/2022

## 2022-01-18 NOTE — PROGRESS NOTES
HPI     Annual Eye Exam   Decrease in Distance Vision    Last edited by Joellen Masterson MA on 1/18/2022  2:22 PM. (History)            Assessment /Plan     For exam results, see Encounter Report.    Cortical age-related cataract of both eyes  Cataracts not significantly affecting activities of daily living and therefore surgery is not indicated at this time.   Will continue to monitor over the next 12 months. Pt to call or RTC with any significant change in vision prior to next visit.     Dry eyes, bilateral  Start refresh bid OU  Pt to let us know if symptoms do not improve after trial of refresh    Irregular astigmatism of both eyes  Presbyopia  Eyeglass Final Rx     Eyeglass Final Rx       Sphere Cylinder Axis Add    Right -0.25 +0.50 180 +2.50    Left [place]   +2.50    Expiration Date: 1/19/2023                RTC 1 yr for dilated eye exam or PRN if any problems.   Discussed above and answered questions.

## 2022-01-19 ENCOUNTER — TELEPHONE (OUTPATIENT)
Dept: TRANSPLANT | Facility: CLINIC | Age: 57
End: 2022-01-19
Payer: MEDICAID

## 2022-01-19 DIAGNOSIS — Z94.4 LIVER REPLACED BY TRANSPLANT: Primary | ICD-10-CM

## 2022-01-19 NOTE — LETTER
January 19, 2022    Marcie Bazan  [address] [address]          Dear Marcie Bazan:  MRN: [id number]    This is a follow up to your recent labs, your lab results were stable.  There are no medicine changes.  Please have your labs drawn again on 4/18/22.      If you cannot have your labs drawn on the scheduled date, it is your responsibility to call the transplant department to reschedule.  Please call [phone] and ask to speak to Mica PURVIS -  for all scheduling requests.     Sincerely,    Viola ASNDHUN, RN    Your Liver Transplant Coordinator    [facility]  [address] [place] [id number] [phone]

## 2022-01-19 NOTE — TELEPHONE ENCOUNTER
Letter sent to patient stating: Your labs have been reviewed by your Transplant physician, no action required. Next labs due 4/18/22        ----- Message from Joselin Souza MD sent at 1/18/2022  4:41 PM CST -----  Labs ok but tacro slightly low, repeat labs in 4 weeks with a GGT

## 2022-01-24 ENCOUNTER — PATIENT MESSAGE (OUTPATIENT)
Dept: AUDIOLOGY | Facility: CLINIC | Age: 57
End: 2022-01-24
Payer: MEDICAID

## 2022-01-28 ENCOUNTER — CLINICAL SUPPORT (OUTPATIENT)
Dept: AUDIOLOGY | Facility: CLINIC | Age: 57
End: 2022-01-28
Payer: MEDICAID

## 2022-01-28 DIAGNOSIS — Z71.89 HEARING AID CONSULTATION: Primary | ICD-10-CM

## 2022-01-28 NOTE — PROGRESS NOTES
Marcie Blanca Bazan was seen 01/28/2022 for a hearing aid consult to discuss hearing aids.     The following aids will be ordered:    A pair of O[employer]on More Minrite R's (Level 3)   Color: Renay Black  Speaker Units:  3 R/L   Rut:  TBD    Patient will call when she is ready to move forward. She may also contact [facility].

## 2022-01-31 ENCOUNTER — TELEPHONE (OUTPATIENT)
Dept: PAIN MEDICINE | Facility: CLINIC | Age: 57
End: 2022-01-31
Payer: MEDICAID

## 2022-01-31 ENCOUNTER — IMMUNIZATION (OUTPATIENT)
Dept: INTERNAL MEDICINE | Facility: CLINIC | Age: 57
End: 2022-01-31
Payer: MEDICAID

## 2022-01-31 ENCOUNTER — TELEPHONE (OUTPATIENT)
Dept: HEPATOLOGY | Facility: CLINIC | Age: 57
End: 2022-01-31
Payer: MEDICAID

## 2022-01-31 ENCOUNTER — OFFICE VISIT (OUTPATIENT)
Dept: HEPATOLOGY | Facility: CLINIC | Age: 57
End: 2022-01-31
Payer: MEDICAID

## 2022-01-31 VITALS
SYSTOLIC BLOOD PRESSURE: 120 MMHG | DIASTOLIC BLOOD PRESSURE: 78 MMHG | HEART RATE: 72 BPM | BODY MASS INDEX: 34.68 KG/M2 | WEIGHT: 208.13 LBS | HEIGHT: 65 IN

## 2022-01-31 DIAGNOSIS — Z94.4 LIVER TRANSPLANTED: ICD-10-CM

## 2022-01-31 DIAGNOSIS — D84.9 IMMUNOSUPPRESSION: Primary | ICD-10-CM

## 2022-01-31 PROBLEM — T38.0X5A ADRENAL CORTICAL STEROIDS CAUSING ADVERSE EFFECT IN THERAPEUTIC USE: Status: RESOLVED | Noted: 2017-12-26 | Resolved: 2022-01-31

## 2022-01-31 PROBLEM — T86.41: Status: RESOLVED | Noted: 2018-03-16 | Resolved: 2022-01-31

## 2022-01-31 PROBLEM — R63.4 UNINTENTIONAL WEIGHT LOSS: Status: RESOLVED | Noted: 2020-03-26 | Resolved: 2022-01-31

## 2022-01-31 PROCEDURE — 99213 OFFICE O/P EST LOW 20 MIN: CPT | Mod: S$PBB,,, | Performed by: INTERNAL MEDICINE

## 2022-01-31 PROCEDURE — 3074F PR MOST RECENT SYSTOLIC BLOOD PRESSURE < 130 MM HG: ICD-10-PCS | Mod: CPTII,,, | Performed by: INTERNAL MEDICINE

## 2022-01-31 PROCEDURE — 3078F PR MOST RECENT DIASTOLIC BLOOD PRESSURE < 80 MM HG: ICD-10-PCS | Mod: CPTII,,, | Performed by: INTERNAL MEDICINE

## 2022-01-31 PROCEDURE — 4010F PR ACE/ARB THEARPY RXD/TAKEN: ICD-10-PCS | Mod: CPTII,,, | Performed by: INTERNAL MEDICINE

## 2022-01-31 PROCEDURE — 3078F DIAST BP <80 MM HG: CPT | Mod: CPTII,,, | Performed by: INTERNAL MEDICINE

## 2022-01-31 PROCEDURE — 3074F SYST BP LT 130 MM HG: CPT | Mod: CPTII,,, | Performed by: INTERNAL MEDICINE

## 2022-01-31 PROCEDURE — 4010F ACE/ARB THERAPY RXD/TAKEN: CPT | Mod: CPTII,,, | Performed by: INTERNAL MEDICINE

## 2022-01-31 PROCEDURE — 1160F PR REVIEW ALL MEDS BY PRESCRIBER/CLIN PHARMACIST DOCUMENTED: ICD-10-PCS | Mod: CPTII,,, | Performed by: INTERNAL MEDICINE

## 2022-01-31 PROCEDURE — 99213 OFFICE O/P EST LOW 20 MIN: CPT | Mod: PBBFAC | Performed by: INTERNAL MEDICINE

## 2022-01-31 PROCEDURE — 90686 IIV4 VACC NO PRSV 0.5 ML IM: CPT | Mod: PBBFAC

## 2022-01-31 PROCEDURE — 3008F BODY MASS INDEX DOCD: CPT | Mod: CPTII,,, | Performed by: INTERNAL MEDICINE

## 2022-01-31 PROCEDURE — 3008F PR BODY MASS INDEX (BMI) DOCUMENTED: ICD-10-PCS | Mod: CPTII,,, | Performed by: INTERNAL MEDICINE

## 2022-01-31 PROCEDURE — 1159F MED LIST DOCD IN RCRD: CPT | Mod: CPTII,,, | Performed by: INTERNAL MEDICINE

## 2022-01-31 PROCEDURE — 99999 PR PBB SHADOW E&M-EST. PATIENT-LVL III: CPT | Mod: PBBFAC,,, | Performed by: INTERNAL MEDICINE

## 2022-01-31 PROCEDURE — 1159F PR MEDICATION LIST DOCUMENTED IN MEDICAL RECORD: ICD-10-PCS | Mod: CPTII,,, | Performed by: INTERNAL MEDICINE

## 2022-01-31 PROCEDURE — 1160F RVW MEDS BY RX/DR IN RCRD: CPT | Mod: CPTII,,, | Performed by: INTERNAL MEDICINE

## 2022-01-31 PROCEDURE — 99213 PR OFFICE/OUTPT VISIT, EST, LEVL III, 20-29 MIN: ICD-10-PCS | Mod: S$PBB,,, | Performed by: INTERNAL MEDICINE

## 2022-01-31 PROCEDURE — 99999 PR PBB SHADOW E&M-EST. PATIENT-LVL III: ICD-10-PCS | Mod: PBBFAC,,, | Performed by: INTERNAL MEDICINE

## 2022-01-31 NOTE — TELEPHONE ENCOUNTER
patient confirmed appt with  Emilee Buitrago PA-C Pt understood. All questions answered.   Ranulfo Cloud MA  [facility]
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
## 2022-01-31 NOTE — TELEPHONE ENCOUNTER
Returned patient's call, patient stated that she was currently here in the lobby. I informed the patient that I would work her in to the schedule since I had a patient that cancelled. Patient voiced understanding.

## 2022-01-31 NOTE — TELEPHONE ENCOUNTER
----- Message from Char Wakefield sent at 1/31/2022  7:38 AM CST -----  Pt would like return call,  pt states her son in the hospital, may need to reschedule appt today, would like to know next available time.  Please call back at [phone].   Md Kimberly

## 2022-01-31 NOTE — PROGRESS NOTES
Transplant Hepatology  Liver Transplant Recipient Follow-up    Transplant Date: 12/26/2017  UNOS Native Liver Dx: Alcoholic Cirrhosis    Marcie is here for follow up of her liver transplant.    ORGAN: LIVER  Whole or Partial: whole liver  Donor Type: donation after brain death  CDC High Risk: no  Donor CMV Status: Negative  Donor HCV Status: Negative  Donor HBcAb: Negative  Biliary Anastomosis: end to end  Arterial Anatomy: standard  IVC reconstruction: cavaplasty piggyback  Portal vein status: patent  .    Subjective:     Interval History:  Currently, she is doing well. Current complaints include no major issues since last year.  She reports at the recent lab draw she was dealing with an illness but that has resolved.  Her main issue has been related to her son who has been sick as he is on dialysis and trying to get a kidney transplant.    Review of Systems    Objective:     Physical Exam  Vitals reviewed.   Constitutional:       General: She is not in acute distress.     Appearance: She is well-developed and well-nourished.   HENT:      Head: Normocephalic and atraumatic.      Mouth/Throat:      Mouth: Oropharynx is clear and moist.      Pharynx: No oropharyngeal exudate.   Eyes:      General: No scleral icterus.        Right eye: No discharge.         Left eye: No discharge.      Conjunctiva/sclera: Conjunctivae normal.      Pupils: Pupils are equal, round, and reactive to light.   Pulmonary:      Effort: Pulmonary effort is normal. No respiratory distress.      Breath sounds: Normal breath sounds. No wheezing.   Abdominal:      General: There is no distension.      Palpations: Abdomen is soft.      Tenderness: There is no abdominal tenderness.   Musculoskeletal:         General: No edema.   Neurological:      Mental Status: She is alert and oriented to person, place, and time.   Psychiatric:         Mood and Affect: Mood and affect normal.         Behavior: Behavior normal.         WBC   Date Value Ref Range  Status   01/14/2022 12.59 3.90 - 12.70 K/uL Final     Hemoglobin   Date Value Ref Range Status   01/14/2022 12.9 12.0 - 16.0 g/dL Final     POC Hematocrit   Date Value Ref Range Status   12/26/2017 28 (L) 36 - 54 %PCV Final     Hematocrit   Date Value Ref Range Status   01/14/2022 40.6 37.0 - 48.5 % Final     Platelets   Date Value Ref Range Status   01/14/2022 87 (L) 150 - 450 K/uL Final     BUN   Date Value Ref Range Status   01/14/2022 12 6 - 20 mg/dL Final     Creatinine   Date Value Ref Range Status   01/14/2022 0.8 0.5 - 1.4 mg/dL Final     Glucose   Date Value Ref Range Status   01/14/2022 102 70 - 110 mg/dL Final     Calcium   Date Value Ref Range Status   01/14/2022 9.4 8.7 - 10.5 mg/dL Final     Sodium   Date Value Ref Range Status   01/14/2022 137 136 - 145 mmol/L Final     Potassium   Date Value Ref Range Status   01/14/2022 3.5 3.5 - 5.1 mmol/L Final     Chloride   Date Value Ref Range Status   01/14/2022 102 95 - 110 mmol/L Final     Magnesium   Date Value Ref Range Status   10/12/2021 1.7 1.6 - 2.6 mg/dL Final     AST   Date Value Ref Range Status   01/14/2022 12 10 - 40 U/L Final     ALT   Date Value Ref Range Status   01/14/2022 16 10 - 44 U/L Final     Alkaline Phosphatase   Date Value Ref Range Status   01/14/2022 189 (H) 55 - 135 U/L Final     Total Bilirubin   Date Value Ref Range Status   01/14/2022 0.4 0.1 - 1.0 mg/dL Final     Comment:     For infants and newborns, interpretation of results should be based  on gestational age, weight and in agreement with clinical  observations.    Premature Infant recommended reference ranges:  Up to 24 hours.............<8.0 mg/dL  Up to 48 hours............<12.0 mg/dL  3-5 days..................<15.0 mg/dL  6-29 days.................<15.0 mg/dL       Albumin   Date Value Ref Range Status   01/14/2022 3.7 3.5 - 5.2 g/dL Final     INR   Date Value Ref Range Status   02/27/2020 1.0 0.8 - 1.2 Final     Comment:     Coumadin Therapy:  2.0 - 3.0 for INR for all  indicators except mechanical heart valves  and antiphospholipid syndromes which should use 2.5 - 3.5.       Lab Results   Component Value Date    TACROLIMUS 4.7 (L) 01/14/2022           Assessment/Plan:     1. Immunosuppression    2. Liver transplanted        Immunosuppression  -Continue current IS    Liver transplant-good allograft function  -Continue with routine lab monitoring  -Continue with PCP f/u  -Cancer screening- patient advised about increased risks of cancer regular age appropriate cancer screening    Return to clinic in 1 year    Patient was seen in the liver transplant department at [facility].    Joselin Souza MD           [facility] Patient Status  Functional Status: 100% - Normal, no complaints, no evidence of disease  Physical Capacity: No Limitations

## 2022-02-01 ENCOUNTER — OFFICE VISIT (OUTPATIENT)
Dept: PAIN MEDICINE | Facility: CLINIC | Age: 57
End: 2022-02-01
Payer: MEDICAID

## 2022-02-01 VITALS — BODY MASS INDEX: 34.63 KG/M2 | WEIGHT: 207.88 LBS | HEIGHT: 65 IN

## 2022-02-01 DIAGNOSIS — G89.29 INSOMNIA SECONDARY TO CHRONIC PAIN: ICD-10-CM

## 2022-02-01 DIAGNOSIS — M54.16 BILATERAL LUMBAR RADICULOPATHY: ICD-10-CM

## 2022-02-01 DIAGNOSIS — M79.18 PIRIFORMIS MUSCLE PAIN: Primary | ICD-10-CM

## 2022-02-01 DIAGNOSIS — G62.9 NEUROPATHY: ICD-10-CM

## 2022-02-01 DIAGNOSIS — G47.01 INSOMNIA SECONDARY TO CHRONIC PAIN: ICD-10-CM

## 2022-02-01 PROCEDURE — 99214 PR OFFICE/OUTPT VISIT, EST, LEVL IV, 30-39 MIN: ICD-10-PCS | Mod: 95,,, | Performed by: PHYSICIAN ASSISTANT

## 2022-02-01 PROCEDURE — 4010F PR ACE/ARB THEARPY RXD/TAKEN: ICD-10-PCS | Mod: CPTII,95,, | Performed by: PHYSICIAN ASSISTANT

## 2022-02-01 PROCEDURE — 3008F BODY MASS INDEX DOCD: CPT | Mod: CPTII,95,, | Performed by: PHYSICIAN ASSISTANT

## 2022-02-01 PROCEDURE — 1160F PR REVIEW ALL MEDS BY PRESCRIBER/CLIN PHARMACIST DOCUMENTED: ICD-10-PCS | Mod: CPTII,95,, | Performed by: PHYSICIAN ASSISTANT

## 2022-02-01 PROCEDURE — 99214 OFFICE O/P EST MOD 30 MIN: CPT | Mod: 95,,, | Performed by: PHYSICIAN ASSISTANT

## 2022-02-01 PROCEDURE — 1160F RVW MEDS BY RX/DR IN RCRD: CPT | Mod: CPTII,95,, | Performed by: PHYSICIAN ASSISTANT

## 2022-02-01 PROCEDURE — 4010F ACE/ARB THERAPY RXD/TAKEN: CPT | Mod: CPTII,95,, | Performed by: PHYSICIAN ASSISTANT

## 2022-02-01 PROCEDURE — 1159F PR MEDICATION LIST DOCUMENTED IN MEDICAL RECORD: ICD-10-PCS | Mod: CPTII,95,, | Performed by: PHYSICIAN ASSISTANT

## 2022-02-01 PROCEDURE — 3008F PR BODY MASS INDEX (BMI) DOCUMENTED: ICD-10-PCS | Mod: CPTII,95,, | Performed by: PHYSICIAN ASSISTANT

## 2022-02-01 PROCEDURE — 1159F MED LIST DOCD IN RCRD: CPT | Mod: CPTII,95,, | Performed by: PHYSICIAN ASSISTANT

## 2022-02-01 RX ORDER — CYCLOBENZAPRINE HCL 10 MG
TABLET ORAL
Qty: 90 TABLET | Refills: 5 | Status: SHIPPED | OUTPATIENT
Start: 2022-02-01 | End: 2022-09-21 | Stop reason: SDUPTHER

## 2022-02-01 RX ORDER — GABAPENTIN 300 MG/1
300 CAPSULE ORAL 3 TIMES DAILY
Qty: 120 CAPSULE | Refills: 5 | Status: SHIPPED | OUTPATIENT
Start: 2022-02-01 | End: 2022-09-21 | Stop reason: ALTCHOICE

## 2022-02-01 RX ORDER — GABAPENTIN 600 MG/1
600 TABLET ORAL 3 TIMES DAILY
Qty: 90 TABLET | Refills: 5 | Status: SHIPPED | OUTPATIENT
Start: 2022-02-01 | End: 2022-09-21 | Stop reason: ALTCHOICE

## 2022-02-01 RX ORDER — AMITRIPTYLINE HYDROCHLORIDE 10 MG/1
TABLET, FILM COATED ORAL
Qty: 30 TABLET | Refills: 5 | Status: SHIPPED | OUTPATIENT
Start: 2022-02-01 | End: 2022-06-24 | Stop reason: SDUPTHER

## 2022-02-04 DIAGNOSIS — I10 ESSENTIAL HYPERTENSION: Chronic | ICD-10-CM

## 2022-02-04 RX ORDER — NIFEDIPINE 60 MG/1
TABLET, EXTENDED RELEASE ORAL
Qty: 90 TABLET | Refills: 0 | Status: SHIPPED | OUTPATIENT
Start: 2022-02-04 | End: 2022-05-10 | Stop reason: SDUPTHER

## 2022-02-04 NOTE — TELEPHONE ENCOUNTER
REFILL REQUEST APPROVED.  Requested Prescriptions   Pending Prescriptions Disp Refills    NIFEdipine (PROCARDIA-XL) 60 MG (OSM) 24 hr tablet [Pharmacy Med Name: NIFEDIPINE 60MG ER (XL/OS) TABLETS] 90 tablet 0     Sig: TAKE 1 TABLET(60 MG) BY MOUTH EVERY EVENING

## 2022-02-15 DIAGNOSIS — Z29.89 PROPHYLACTIC IMMUNOTHERAPY: ICD-10-CM

## 2022-02-15 DIAGNOSIS — R60.0 BILATERAL LEG EDEMA: ICD-10-CM

## 2022-02-16 DIAGNOSIS — Z29.89 PROPHYLACTIC IMMUNOTHERAPY: ICD-10-CM

## 2022-02-16 DIAGNOSIS — R60.0 BILATERAL LEG EDEMA: ICD-10-CM

## 2022-02-16 NOTE — TELEPHONE ENCOUNTER
Refill Routing Note   Medication(s) are not appropriate for processing by [facility] for the following reason(s):      - Drug-Drug Interaction (chlorthalidone 25 mg and furosemide 40 mg duplicate therapy)    [facility] action(s):  Defer          --->Care Gap information included in message below if applicable.   Medication reconciliation completed: No   Automatic Epic Generated Protocol Data:        Requested Prescriptions   Pending Prescriptions Disp Refills    furosemide (LASIX) 40 MG tablet [Pharmacy Med Name: FUROSEMIDE 40MG TABLETS] 90 tablet 3     Sig: TAKE 1 TABLET(40 MG) BY MOUTH EVERY DAY       Cardiovascular:  Diuretics - Loop Passed - 2/16/2022 10:41 AM        Passed - Patient is at least 18 years old        Passed - Last BP in normal range within 360 days     BP Readings from Last 1 Encounters:   01/31/22 120/78               Passed - Valid encounter within last 15 months     Recent Visits  Date Type Provider Dept   12/13/21 Office Visit KATJA Munoz MD [facility]   11/03/20 Office Visit KATJA Munoz MD [facility]   09/11/20 Office Visit KATJA Munoz MD [facility]   07/29/20 Office Visit Katy Childress MD [facility]   03/25/20 Office Visit Jenny Suggs PA-C [facility]   Showing recent visits within past 720 days and meeting all other requirements  Future Appointments  No visits were found meeting these conditions.  Showing future appointments within next 150 days and meeting all other requirements      Future Appointments              In 4 months KATJA Munoz MD [facility] 2nd Fl, [facility]    In 5 months Emilee Buitrago PA-C [facility]    In 11 months Elizabeth Lejeune, NP [facility] 3rd Fl, [facility]                Passed - K in normal range and within 360 days     POC Potassium   Date Value Ref Range Status   12/26/2017 3.3 (L) 3.5 - 5.1  mmol/L Final   12/26/2017 3.7 3.5 - 5.1 mmol/L Final   12/26/2017 3.9 3.5 - 5.1 mmol/L Final     Potassium   Date Value Ref Range Status   01/14/2022 3.5 3.5 - 5.1 mmol/L Final   12/13/2021 3.4 (L) 3.5 - 5.1 mmol/L Final   11/10/2021 3.3 (L) 3.5 - 5.1 mmol/L Final              Passed - Na is between 130 and 148 and within 360 days     POC Sodium   Date Value Ref Range Status   12/26/2017 135 (L) 136 - 145 mmol/L Final   12/26/2017 133 (L) 136 - 145 mmol/L Final   12/26/2017 132 (L) 136 - 145 mmol/L Final     Sodium   Date Value Ref Range Status   01/14/2022 137 136 - 145 mmol/L Final   11/10/2021 139 136 - 145 mmol/L Final   10/12/2021 142 136 - 145 mmol/L Final              Passed - Cr is 1.39 or below and within 360 days     Lab Results   Component Value Date    CREATININE 0.8 01/14/2022    CREATININE 1.0 11/10/2021    CREATININE 0.9 10/12/2021              Passed - eGFR within 360 days     Lab Results   Component Value Date    EGFRNONAA >60.0 01/14/2022    EGFRNONAA >60 11/10/2021    EGFRNONAA >60.0 10/12/2021                      Appointments  past 12m or future 3m with PCP    Date Provider   Last Visit   12/13/2021 KATJA Munoz MD   Next Visit   6/16/2022 KATJA Munoz MD   ED visits in past 90 days: 1        Note composed:10:46 AM 02/16/2022

## 2022-02-16 NOTE — TELEPHONE ENCOUNTER
No new care gaps identified.  Powered by [employer] by [employer]. Reference number: [id number].   2/16/2022 4:16:45 AM CST

## 2022-02-17 RX ORDER — FUROSEMIDE 40 MG/1
40 TABLET ORAL DAILY
Qty: 30 TABLET | Refills: 1 | Status: SHIPPED | OUTPATIENT
Start: 2022-02-17 | End: 2022-05-18

## 2022-02-19 RX ORDER — FUROSEMIDE 40 MG/1
TABLET ORAL
Qty: 90 TABLET | Refills: 3 | OUTPATIENT
Start: 2022-02-19

## 2022-02-19 NOTE — TELEPHONE ENCOUNTER
This is a duplicate request. I responded to an earlier request to refill this medicine. See previous documentation for outcome, which will include reason for denial, if the request was previously denied.  Requested Prescriptions   Pending Prescriptions Disp Refills    furosemide (LASIX) 40 MG tablet [Pharmacy Med Name: FUROSEMIDE 40MG TABLETS] 90 tablet 3     Sig: TAKE 1 TABLET(40 MG) BY MOUTH EVERY DAY       #LMRX - DUPLICATE REQUEST  02/19/2022 8:40 AM

## 2022-02-21 ENCOUNTER — PATIENT MESSAGE (OUTPATIENT)
Dept: OTHER | Facility: OTHER | Age: 57
End: 2022-02-21
Payer: MEDICAID

## 2022-02-24 NOTE — ASSESSMENT & PLAN NOTE
-- Holding diuretics at present, s/p 2L removed on 11/22 with testing negative for SBP  -- No abdominal pain today         Lidocaine-epinephrine 1-1:646462 % injection   1.5mL once for one use, starting 1/16/2020 ending 1/16/2020,  2mL disp, R-0, injection  Injected by Dr. Malissa Smith     Finasteride Male Counseling: Finasteride Counseling:  I discussed with the patient the risks of use of finasteride including but not limited to decreased libido, decreased ejaculate volume, gynecomastia, and depression. Women should not handle medication.  All of the patient's questions and concerns were addressed. Finasteride Counseling:  I discussed with the patient the risks of use of finasteride including but not limited to decreased libido, decreased ejaculate volume, gynecomastia, and depression. Women should not handle medication.  All of the patient's questions and concerns were addressed.

## 2022-03-10 NOTE — ASSESSMENT & PLAN NOTE
- continue prograf, steroids, and cellcept  - monitor prograf levels and adjust for therapeutic level     3 = A little assistance

## 2022-03-15 ENCOUNTER — PATIENT MESSAGE (OUTPATIENT)
Dept: INTERNAL MEDICINE | Facility: CLINIC | Age: 57
End: 2022-03-15
Payer: MEDICAID

## 2022-03-16 RX ORDER — POTASSIUM CHLORIDE 750 MG/1
10 TABLET, EXTENDED RELEASE ORAL 2 TIMES DAILY
COMMUNITY
Start: 2022-01-27 | End: 2022-03-16 | Stop reason: SDUPTHER

## 2022-03-16 RX ORDER — POTASSIUM CHLORIDE 750 MG/1
10 TABLET, EXTENDED RELEASE ORAL 2 TIMES DAILY
Qty: 180 TABLET | Refills: 2 | Status: SHIPPED | OUTPATIENT
Start: 2022-03-16 | End: 2022-10-24

## 2022-03-16 NOTE — TELEPHONE ENCOUNTER
REFILL REQUEST APPROVED.  Requested Prescriptions   Pending Prescriptions Disp Refills    potassium chloride (KLOR-CON) 10 MEQ TbSR 180 tablet 2     Sig: Take 1 tablet (10 mEq total) by mouth 2 (two) times daily.

## 2022-03-16 NOTE — TELEPHONE ENCOUNTER
Refill Routing Note   Medication(s) are not appropriate for processing by [facility] for the following reason(s):      - Drug-Disease Interaction (potassium chloride and Erosive esophagitis)    [facility] action(s):  Defer Medication-related problems identified: Drug-disease interaction        --->Care Gap information included in message below if applicable.   Medication reconciliation completed: No   Automatic Epic Generated Protocol Data:        Requested Prescriptions   Pending Prescriptions Disp Refills    potassium chloride (KLOR-CON) 10 MEQ TbSR 180 tablet 2     Sig: Take 1 tablet (10 mEq total) by mouth 2 (two) times daily.       Endocrinology:  Minerals - Potassium Supplementation Passed - 3/16/2022 10:07 AM        Passed - Patient is at least 18 years old        Passed - Valid encounter within last 15 months     Recent Visits  Date Type Provider Dept   12/13/21 Office Visit KATJA Munoz MD [facility]   11/03/20 Office Visit KATJA Munoz MD [facility]   09/11/20 Office Visit KATJA Munoz MD [facility]   07/29/20 Office Visit Katy Childress MD [facility]   Showing recent visits within past 720 days and meeting all other requirements  Future Appointments  No visits were found meeting these conditions.  Showing future appointments within next 150 days and meeting all other requirements      Future Appointments              In 1 month LABORATORY, [facility] - Lab 1st Fl, [place]    In 3 months KATJA Munoz MD [facility] Internal Med 2nd Fl, [facility]    In 4 months Emilee Buitrago PA-C [facility]    In 10 months Elizabeth Lejeune, NP [facility] 3rd Fl, [facility]                Passed - K is 5.2 or below and within 360 days     POC Potassium   Date Value Ref Range Status   12/26/2017 3.3 (L) 3.5 - 5.1 mmol/L Final   12/26/2017 3.7 3.5 - 5.1 mmol/L Final   12/26/2017 3.9 3.5 - 5.1  mmol/L Final     Potassium   Date Value Ref Range Status   01/14/2022 3.5 3.5 - 5.1 mmol/L Final   12/13/2021 3.4 (L) 3.5 - 5.1 mmol/L Final   11/10/2021 3.3 (L) 3.5 - 5.1 mmol/L Final              Passed - Cr is 1.39 or below and within 360 days     Lab Results   Component Value Date    CREATININE 0.8 01/14/2022    CREATININE 1.0 11/10/2021    CREATININE 0.9 10/12/2021              Passed - eGFR within 360 days     Lab Results   Component Value Date    EGFRNONAA >60.0 01/14/2022    EGFRNONAA >60 11/10/2021    EGFRNONAA >60.0 10/12/2021                 Signed Prescriptions Disp Refills    potassium chloride (KLOR-CON) 10 MEQ TbSR       Sig: Take 10 mEq by mouth 2 (two) times daily.       There is no refill protocol information for this order           Appointments  past 12m or future 3m with PCP    Date Provider   Last Visit   12/13/2021 KATJA Munoz MD   Next Visit   6/16/2022 KATJA Munoz MD   ED visits in past 90 days: 0        Note composed:10:59 AM 03/16/2022

## 2022-03-16 NOTE — TELEPHONE ENCOUNTER
No new care gaps identified.  Powered by [employer] by [employer]. Reference number: [id number].   3/16/2022 10:08:06 AM LORNAT

## 2022-04-07 ENCOUNTER — PATIENT MESSAGE (OUTPATIENT)
Dept: PAIN MEDICINE | Facility: CLINIC | Age: 57
End: 2022-04-07
Payer: MEDICAID

## 2022-04-07 ENCOUNTER — TELEPHONE (OUTPATIENT)
Dept: PAIN MEDICINE | Facility: CLINIC | Age: 57
End: 2022-04-07
Payer: MEDICAID

## 2022-04-07 DIAGNOSIS — M70.61 GREATER TROCHANTERIC BURSITIS OF BOTH HIPS: ICD-10-CM

## 2022-04-07 DIAGNOSIS — M70.62 GREATER TROCHANTERIC BURSITIS OF BOTH HIPS: ICD-10-CM

## 2022-04-07 DIAGNOSIS — M46.1 SACROILIITIS: Primary | ICD-10-CM

## 2022-04-07 DIAGNOSIS — M79.18 PIRIFORMIS MUSCLE PAIN: ICD-10-CM

## 2022-04-07 NOTE — TELEPHONE ENCOUNTER
Contacted Pt and schedule procedure with Dr. Caballero. All questions answered, verbally reviewed all procedure instructions and sent via [employer]

## 2022-04-08 ENCOUNTER — TELEPHONE (OUTPATIENT)
Dept: PAIN MEDICINE | Facility: CLINIC | Age: 57
End: 2022-04-08
Payer: MEDICAID

## 2022-04-08 RX ORDER — OXYCODONE HYDROCHLORIDE 5 MG/1
5 TABLET ORAL EVERY 8 HOURS PRN
Qty: 21 TABLET | Refills: 0 | Status: SHIPPED | OUTPATIENT
Start: 2022-04-08 | End: 2022-04-15

## 2022-04-08 NOTE — TELEPHONE ENCOUNTER
Contacted Pt, Pt states she is in increased pain and waited too long to schedule her procedure. Pain causing difficulty getting in and out of bed requesting some medication to help as she cannot take OTC due to s/p liver transplant. Please advise next step Pt concerned she will have to go to ER for a shot if she cannot get any relief.

## 2022-04-08 NOTE — TELEPHONE ENCOUNTER
----- Message from Lidia Patiño MA sent at 4/8/2022  9:14 AM CDT -----  Contact: pt    ----- Message -----  From: Shreya Melendrez  Sent: 4/8/2022   9:08 AM CDT  To: Minna BARTHOLOMEW Staff    The pt request a return call, no additional info given and can be reached at [phone]///[id number]

## 2022-04-08 NOTE — TELEPHONE ENCOUNTER
Called patient . Informed patient Dr. Caballero sent in oxycodone to her pharmacy . Pt understood. All questions answered.   Ranulfo Cloud MA  [facility]

## 2022-04-17 ENCOUNTER — PATIENT MESSAGE (OUTPATIENT)
Dept: ADMINISTRATIVE | Facility: OTHER | Age: 57
End: 2022-04-17
Payer: MEDICAID

## 2022-04-19 ENCOUNTER — PATIENT MESSAGE (OUTPATIENT)
Dept: INTERNAL MEDICINE | Facility: CLINIC | Age: 57
End: 2022-04-19
Payer: MEDICAID

## 2022-04-19 DIAGNOSIS — E78.2 MIXED HYPERLIPIDEMIA: Primary | ICD-10-CM

## 2022-04-19 NOTE — PRE-PROCEDURE INSTRUCTIONS
Spoke with patient regarding procedure scheduled on 4.21     Arrival time 0700     Has patient been sick with fever or on antibiotics within the last 7 days? No     Does the patient have any open wounds, sores or rashes? No     Does the patient have any recent fractures? no     Has patient received a vaccination within the last 7 days? No     Received the COVID vaccination? yes     Has the patient stopped all medications as directed? Na     Does patient have a pacemaker and or defibrillator? no     Does the patient have a ride to and from procedure and someone reliable to remain with patient? son     Is the patient diabetic? no     Does the patient have sleep apnea? Or use O2 at home? tien cpap     Is the patient receiving sedation? yes     Is the patient instructed to remain NPO beginning at midnight the night before their procedure? yes     Procedure location confirmed with patient? Yes     Covid- Denies signs/symptoms. Instructed to notify PAT/MD if any changes.

## 2022-04-20 ENCOUNTER — LAB VISIT (OUTPATIENT)
Dept: LAB | Facility: HOSPITAL | Age: 57
End: 2022-04-20
Attending: INTERNAL MEDICINE
Payer: MEDICAID

## 2022-04-20 DIAGNOSIS — Z94.4 LIVER REPLACED BY TRANSPLANT: ICD-10-CM

## 2022-04-20 LAB
ALBUMIN SERPL BCP-MCNC: 3.5 G/DL (ref 3.5–5.2)
ALP SERPL-CCNC: 214 U/L (ref 55–135)
ALT SERPL W/O P-5'-P-CCNC: 14 U/L (ref 10–44)
ANION GAP SERPL CALC-SCNC: 11 MMOL/L (ref 8–16)
AST SERPL-CCNC: 11 U/L (ref 10–40)
BASOPHILS # BLD AUTO: 0.04 K/UL (ref 0–0.2)
BASOPHILS NFR BLD: 0.4 % (ref 0–1.9)
BILIRUB SERPL-MCNC: 0.3 MG/DL (ref 0.1–1)
BUN SERPL-MCNC: 10 MG/DL (ref 6–20)
CALCIUM SERPL-MCNC: 10 MG/DL (ref 8.7–10.5)
CHLORIDE SERPL-SCNC: 107 MMOL/L (ref 95–110)
CO2 SERPL-SCNC: 24 MMOL/L (ref 23–29)
CREAT SERPL-MCNC: 0.8 MG/DL (ref 0.5–1.4)
DIFFERENTIAL METHOD: ABNORMAL
EOSINOPHIL # BLD AUTO: 0.3 K/UL (ref 0–0.5)
EOSINOPHIL NFR BLD: 2.8 % (ref 0–8)
ERYTHROCYTE [DISTWIDTH] IN BLOOD BY AUTOMATED COUNT: 14.6 % (ref 11.5–14.5)
EST. GFR  (AFRICAN AMERICAN): >60 ML/MIN/1.73 M^2
EST. GFR  (NON AFRICAN AMERICAN): >60 ML/MIN/1.73 M^2
GLUCOSE SERPL-MCNC: 95 MG/DL (ref 70–110)
HCT VFR BLD AUTO: 43.5 % (ref 37–48.5)
HGB BLD-MCNC: 13.3 G/DL (ref 12–16)
IMM GRANULOCYTES # BLD AUTO: 0.02 K/UL (ref 0–0.04)
IMM GRANULOCYTES NFR BLD AUTO: 0.2 % (ref 0–0.5)
LYMPHOCYTES # BLD AUTO: 2 K/UL (ref 1–4.8)
LYMPHOCYTES NFR BLD: 21.4 % (ref 18–48)
MCH RBC QN AUTO: 26.2 PG (ref 27–31)
MCHC RBC AUTO-ENTMCNC: 30.6 G/DL (ref 32–36)
MCV RBC AUTO: 86 FL (ref 82–98)
MONOCYTES # BLD AUTO: 0.6 K/UL (ref 0.3–1)
MONOCYTES NFR BLD: 6 % (ref 4–15)
NEUTROPHILS # BLD AUTO: 6.6 K/UL (ref 1.8–7.7)
NEUTROPHILS NFR BLD: 69.2 % (ref 38–73)
NRBC BLD-RTO: 0 /100 WBC
PLATELET # BLD AUTO: 98 K/UL (ref 150–450)
PMV BLD AUTO: 10.2 FL (ref 9.2–12.9)
POTASSIUM SERPL-SCNC: 4.8 MMOL/L (ref 3.5–5.1)
PROT SERPL-MCNC: 7.3 G/DL (ref 6–8.4)
RBC # BLD AUTO: 5.08 M/UL (ref 4–5.4)
SODIUM SERPL-SCNC: 142 MMOL/L (ref 136–145)
WBC # BLD AUTO: 9.5 K/UL (ref 3.9–12.7)

## 2022-04-20 PROCEDURE — 80197 ASSAY OF TACROLIMUS: CPT | Performed by: INTERNAL MEDICINE

## 2022-04-20 PROCEDURE — 80053 COMPREHEN METABOLIC PANEL: CPT | Performed by: INTERNAL MEDICINE

## 2022-04-20 PROCEDURE — 85025 COMPLETE CBC W/AUTO DIFF WBC: CPT | Performed by: INTERNAL MEDICINE

## 2022-04-20 PROCEDURE — 36415 COLL VENOUS BLD VENIPUNCTURE: CPT | Performed by: INTERNAL MEDICINE

## 2022-04-21 ENCOUNTER — HOSPITAL ENCOUNTER (OUTPATIENT)
Facility: HOSPITAL | Age: 57
Discharge: HOME | End: 2022-04-21
Attending: PHYSICAL MEDICINE & REHABILITATION | Admitting: PHYSICAL MEDICINE & REHABILITATION
Payer: MEDICAID

## 2022-04-21 VITALS
OXYGEN SATURATION: 100 % | TEMPERATURE: 98 F | WEIGHT: 207.31 LBS | DIASTOLIC BLOOD PRESSURE: 77 MMHG | RESPIRATION RATE: 16 BRPM | HEIGHT: 65 IN | SYSTOLIC BLOOD PRESSURE: 127 MMHG | HEART RATE: 75 BPM | BODY MASS INDEX: 34.54 KG/M2

## 2022-04-21 DIAGNOSIS — M46.1 SACROILIITIS: Primary | ICD-10-CM

## 2022-04-21 DIAGNOSIS — M70.61 GREATER TROCHANTERIC BURSITIS OF BOTH HIPS: ICD-10-CM

## 2022-04-21 DIAGNOSIS — M79.18 PIRIFORMIS MUSCLE PAIN: ICD-10-CM

## 2022-04-21 DIAGNOSIS — M70.62 GREATER TROCHANTERIC BURSITIS OF BOTH HIPS: ICD-10-CM

## 2022-04-21 LAB — TACROLIMUS BLD-MCNC: 4.8 NG/ML (ref 5–15)

## 2022-04-21 PROCEDURE — 20552 NJX 1/MLT TRIGGER POINT 1/2: CPT | Mod: 50 | Performed by: PHYSICAL MEDICINE & REHABILITATION

## 2022-04-21 PROCEDURE — 20610 DRAIN/INJ JOINT/BURSA W/O US: CPT | Mod: 50,59,, | Performed by: PHYSICAL MEDICINE & REHABILITATION

## 2022-04-21 PROCEDURE — 25000003 PHARM REV CODE 250: Performed by: PHYSICAL MEDICINE & REHABILITATION

## 2022-04-21 PROCEDURE — 20610 PR DRAIN/INJECT LARGE JOINT/BURSA: ICD-10-PCS | Mod: 50,59,, | Performed by: PHYSICAL MEDICINE & REHABILITATION

## 2022-04-21 PROCEDURE — 27096 PR INJECTION,SACROILIAC JOINT: ICD-10-PCS | Mod: 50,,, | Performed by: PHYSICAL MEDICINE & REHABILITATION

## 2022-04-21 PROCEDURE — 20610 DRAIN/INJ JOINT/BURSA W/O US: CPT | Mod: 50,59 | Performed by: PHYSICAL MEDICINE & REHABILITATION

## 2022-04-21 PROCEDURE — 20552 PR INJECT TRIGGER POINT, 1 OR 2: ICD-10-PCS | Mod: 59,,, | Performed by: PHYSICAL MEDICINE & REHABILITATION

## 2022-04-21 PROCEDURE — 63600175 PHARM REV CODE 636 W HCPCS: Performed by: PHYSICAL MEDICINE & REHABILITATION

## 2022-04-21 PROCEDURE — 27096 INJECT SACROILIAC JOINT: CPT | Mod: RT

## 2022-04-21 PROCEDURE — 27096 INJECT SACROILIAC JOINT: CPT | Mod: 50,,, | Performed by: PHYSICAL MEDICINE & REHABILITATION

## 2022-04-21 PROCEDURE — 20552 NJX 1/MLT TRIGGER POINT 1/2: CPT | Mod: 59,,, | Performed by: PHYSICAL MEDICINE & REHABILITATION

## 2022-04-21 PROCEDURE — 25500020 PHARM REV CODE 255: Performed by: PHYSICAL MEDICINE & REHABILITATION

## 2022-04-21 PROCEDURE — 27096 INJECT SACROILIAC JOINT: CPT | Mod: LT | Performed by: PHYSICAL MEDICINE & REHABILITATION

## 2022-04-21 RX ORDER — MIDAZOLAM HYDROCHLORIDE 1 MG/ML
INJECTION, SOLUTION INTRAMUSCULAR; INTRAVENOUS
Status: DISCONTINUED | OUTPATIENT
Start: 2022-04-21 | End: 2022-04-21 | Stop reason: HOSPADM

## 2022-04-21 RX ORDER — METHYLPREDNISOLONE ACETATE 40 MG/ML
INJECTION, SUSPENSION INTRA-ARTICULAR; INTRALESIONAL; INTRAMUSCULAR; SOFT TISSUE
Status: DISCONTINUED | OUTPATIENT
Start: 2022-04-21 | End: 2022-04-21 | Stop reason: HOSPADM

## 2022-04-21 RX ORDER — BUPIVACAINE HYDROCHLORIDE 2.5 MG/ML
INJECTION, SOLUTION EPIDURAL; INFILTRATION; INTRACAUDAL
Status: DISCONTINUED | OUTPATIENT
Start: 2022-04-21 | End: 2022-04-21 | Stop reason: HOSPADM

## 2022-04-21 RX ORDER — FENTANYL CITRATE 50 UG/ML
INJECTION, SOLUTION INTRAMUSCULAR; INTRAVENOUS
Status: DISCONTINUED | OUTPATIENT
Start: 2022-04-21 | End: 2022-04-21 | Stop reason: HOSPADM

## 2022-04-21 NOTE — DISCHARGE INSTRUCTIONS

## 2022-04-21 NOTE — H&P
HPI  Patient presenting for Procedure(s) (LRB):  Bilateral SIJ + Bilateral Piriformis + Bilateral GT Bursa Injection (Bilateral)      No health changes since previous encounter    Past Medical History:   Diagnosis Date    Alcohol abuse     Alcoholic hepatitis     Anemia of chronic disease     Arthritis     generialized    Cancer 12/26/2017    liver    Coagulopathy     Dyspnea on exertion 3/26/2020    Encounter for blood transfusion     End stage liver disease     History of alcohol abuse     Hyperlipidemia     Hypersomnia 9/11/2020    Hypertension     Hyponatremia     Liver cirrhosis     Liver transplant candidate 12/26/2017    Obesity (BMI 30-39.9) 1/5/2016    Sleep apnea      Past Surgical History:   Procedure Laterality Date    BREAST BIOPSY Left     benign    BREAST LUMPECTOMY      patient is unsure of date or laterality    CHOLECYSTECTOMY      COLONOSCOPY N/A 12/1/2017    Procedure: COLONOSCOPY;  Surgeon: Filemon Fuentes MD;  Location: [facility] ([address] [place] [address]);  Service: Endoscopy;  Laterality: N/A;    COLONOSCOPY N/A 12/5/2017    Procedure: COLONOSCOPY;  Surgeon: José Chavira MD;  Location: [facility] ENDO ([address] [place] [address]);  Service: Endoscopy;  Laterality: N/A;    EPIDURAL STEROID INJECTION INTO CERVICAL SPINE N/A 1/4/2022    Procedure: C5/6 IL WILBERTO;  Surgeon: Ras Caballero MD;  Location: [facility] PAIN MGT;  Service: Pain Management;  Laterality: N/A;    EXAMINATION UNDER ANESTHESIA N/A 2/18/2020    Procedure: EXAM UNDER ANESTHESIA;  Surgeon: Alden Horowitz MD;  Location: [facility] OR;  Service: General;  Laterality: N/A;    EXCISIONAL HEMORRHOIDECTOMY N/A 2/18/2020    Procedure: HEMORRHOIDECTOMY;  Surgeon: Alden Horowitz MD;  Location: [facility] OR;  Service: General;  Laterality: N/A;    HYSTERECTOMY      complete     INJECTION OF ANESTHETIC AGENT AROUND PUDENDAL NERVE N/A 2/18/2020    Procedure: BLOCK, NERVE, PUDENDAL;  Surgeon: Alden Horowitz MD;  Location: [facility] OR;  Service: General;   Laterality: N/A;    INJECTION OF ANESTHETIC AGENT INTO SACROILIAC JOINT Right 6/14/2019    Procedure: right Sacroiliac Joint Injection;  Surgeon: David Desai MD;  Location: [facility] PAIN MGT;  Service: Pain Management;  Laterality: Right;    INJECTION OF ANESTHETIC AGENT INTO SACROILIAC JOINT Bilateral 2/7/2020    Procedure: Bilateral GT bursa + Bilateral Sacroiliac Joint Injection;  Surgeon: David Desai MD;  [facility];  Service: Pain Management;  Laterality: Bilateral;    INJECTION OF ANESTHETIC AGENT INTO SACROILIAC JOINT Bilateral 7/7/2020    Procedure: Bilateral GT bursa +SIJ injection;  Surgeon: David Desai MD;  Location: [facility] PAIN MGT;  Service: Pain Management;  Laterality: Bilateral;    INJECTION OF ANESTHETIC AGENT INTO SACROILIAC JOINT Right 10/21/2020    Procedure: Right piriformis + right SIJ + right GT bursa injection;  Surgeon: David Desai MD;  Location: [facility] PAIN MGT;  Service: Pain Management;  Laterality: Right;    INJECTION OF ANESTHETIC AGENT INTO SACROILIAC JOINT Bilateral 4/15/2021    Procedure: Bilateral SIJ + Bilateral Piriformis + Bilateral GT Bursa Injection;  Surgeon: Flaco Frazier MD;  Location: [facility] PAIN MGT;  Service: Pain Management;  Laterality: Bilateral;    INJECTION OF JOINT Bilateral 2/7/2020    Procedure: Bilateral GT bursa + Bilateral Sacroiliac Joint Injection;  Surgeon: David Desai MD;  Location: [facility] PAIN MGT;  Service: Pain Management;  Laterality: Bilateral;    INJECTION OF JOINT Bilateral 7/7/2020    Procedure: Bilateral GT bursa +SIJ injection;  Surgeon: David Desai MD;  Location: [facility] PAIN MGT;  Service: Pain Management;  Laterality: Bilateral;    INJECTION OF JOINT Right 10/21/2020    Procedure: Right piriformis + right SIJ + right GT bursa injection;  Surgeon: David Desai MD;  Location: [facility] PAIN MGT;  Service: Pain Management;  Laterality: Right;    INJECTION OF JOINT Bilateral 2/8/2021    Procedure: Bilateral GT  Bursa Injection;  Surgeon: Anna Kaminski MD;  Location: [facility] PAIN MGT;  Service: Pain Management;  Laterality: Bilateral;    INJECTION OF JOINT Bilateral 4/15/2021    Procedure: Bilateral SIJ + Bilateral Piriformis + Bilateral GT Bursa Injection;  Surgeon: Flaco Frazier MD;  Location: [facility] PAIN MGT;  Service: Pain Management;  Laterality: Bilateral;    INJECTION OF PIRIFORMIS MUSCLE Right 6/14/2019    Procedure: Right piriformis injection;  Surgeon: David Desai MD;  Location: [facility] PAIN MGT;  Service: Pain Management;  Laterality: Right;    INJECTION OF PIRIFORMIS MUSCLE Right 10/21/2020    Procedure: Right piriformis + right SIJ + right GT bursa injection;  Surgeon: David Desai MD;  Location: [facility] PAIN MGT;  Service: Pain Management;  Laterality: Right;    INJECTION OF PIRIFORMIS MUSCLE Bilateral 4/15/2021    Procedure: Bilateral SIJ + Bilateral Piriformis + Bilateral GT Bursa Injection;  Surgeon: Flaco Frazier MD;  Location: [facility] PAIN MGT;  Service: Pain Management;  Laterality: Bilateral;    LIVER TRANSPLANT  12/2017    SELECTIVE INJECTION OF ANESTHETIC AGENT AROUND LUMBAR SPINAL NERVE ROOT BY TRANSFORAMINAL APPROACH Bilateral 10/28/2021    Procedure: BLOCK, SPINAL NERVE ROOT, LUMBAR, SELECTIVE, TRANSFORAMINAL APPROACH bilateral L4-5 and Right Knee injection with RN IV sedation;  Surgeon: Flaco Frazier MD;  Location: [facility] PAIN MGT;  Service: Pain Management;  Laterality: Bilateral;     Review of patient's allergies indicates:   Allergen Reactions    Ferrous sulfate Other (See Comments)     Patient states the pill makes her sick. She stated she would rather have a shot        Current Facility-Administered Medications on File Prior to Encounter   Medication Dose Route Frequency Provider Last Rate Last Admin    ondansetron injection 4 mg  4 mg Intravenous Once PRN Ras Caballero MD         Current Outpatient Medications on File Prior to Encounter   Medication Sig Dispense Refill     amitriptyline (ELAVIL) 10 MG tablet TAKE 1 TABLET(10 MG) BY MOUTH EVERY NIGHT AS NEEDED FOR INSOMNIA OR PAIN 30 tablet 5    atorvastatin (LIPITOR) 40 MG tablet TAKE 1 TABLET(40 MG) BY MOUTH EVERY EVENING 90 tablet 3    chlorthalidone (HYGROTEN) 25 MG Tab       cyclobenzaprine (FLEXERIL) 10 MG tablet TAKE 1/2 TO 1 TABLET BY MOUTH THREE TIMES DAILY AS NEEDED FOR MUSCLE SPASMS 90 tablet 5    ergocalciferol (ERGOCALCIFEROL) 50,000 unit Cap TAKE 1 CAPSULE BY MOUTH EVERY 7 DAYS 12 capsule 2    famotidine (PEPCID) 20 MG tablet Take 1 tablet (20 mg total) by mouth 2 (two) times a day. 90 tablet 3    furosemide (LASIX) 40 MG tablet Take 1 tablet (40 mg total) by mouth once daily. 30 tablet 1    gabapentin (NEURONTIN) 300 MG capsule Take 1 capsule (300 mg total) by mouth 3 (three) times daily. With 600mg cap to equal 900mg  capsule 5    gabapentin (NEURONTIN) 600 MG tablet Take 1 tablet (600 mg total) by mouth 3 (three) times daily. With 300mg cap to equal 900mg TID 90 tablet 5    losartan (COZAAR) 25 MG tablet TAKE 1 TABLET(25 MG) BY MOUTH EVERY DAY 30 tablet 11    magnesium oxide (MAG-OX) 400 mg (241.3 mg magnesium) tablet Take 1 tablet (400 mg total) by mouth 2 (two) times daily. 60 tablet 2    NIFEdipine (PROCARDIA-XL) 60 MG (OSM) 24 hr tablet TAKE 1 TABLET(60 MG) BY MOUTH EVERY EVENING 90 tablet 0    pantoprazole (PROTONIX) 40 MG tablet Take 1 tablet (40 mg total) by mouth once daily. 90 tablet 3    potassium chloride (KLOR-CON) 10 MEQ TbSR Take 1 tablet (10 mEq total) by mouth 2 (two) times daily. 180 tablet 2    tacrolimus (PROGRAF) 1 MG Cap Take 2 capsules (2 mg total) by mouth every morning AND 1 capsule (1 mg total) every evening. 90 capsule 11    diclofenac sodium (VOLTAREN) 1 % Gel Apply 2 g topically 2 (two) times daily. 100 g 0    varicella-zoster gE-AS01B, PF, (SHINGRIX) 50 mcg/0.5 mL injection Inject 0.5 mL (one dose) into muscle now; give second dose at least 2 months later (Patient not  "taking: No sig reported) 1 each 1    varicella-zoster gE-AS01B, PF, (SHINGRIX, PF,) 50 mcg/0.5 mL injection Inject into the muscle as directed (Patient not taking: No sig reported) 1 each 0        PMHx, PSHx, Allergies, Medications reviewed in epic    ROS negative except pain complaints in HPI    OBJECTIVE:    /89   Pulse 91   Temp 98.3 °F (36.8 °C) (Temporal)   Resp 18   Ht 5' 5" (1.651 m)   Wt 94 kg (207 lb 5.5 oz)   LMP 01/01/1985 (Approximate) Comment: 1985  SpO2 96%   BMI 34.50 kg/m²     PHYSICAL EXAMINATION:    GENERAL: Well appearing, in no acute distress, alert and oriented x3.  PSYCH:  Mood and affect appropriate.  SKIN: Skin color, texture, turgor normal, no rashes or lesions which will impact the procedure.  CV: RRR with palpation of the radial artery.  PULM: No evidence of respiratory difficulty, symmetric chest rise. Clear to auscultation.  NEURO: Cranial nerves grossly intact.    Plan:    Proceed with procedure as planned Procedure(s) (LRB):  Bilateral SIJ + Bilateral Piriformis + Bilateral GT Bursa Injection (Bilateral)      Ras Caballero MD  04/21/2022            "

## 2022-04-21 NOTE — OP NOTE
Time-out taken to identify patient and procedure side prior to starting the procedure.     04/21/2022      PROCEDURE:  1) Bilateral greater trochanteric bursa injection  2) Bilateral sacroiliac joint injection         3) bilateral piriformis trigger point injection                     REASON FOR PROCEDURE:   Sacroiliitis [M46.1]  Greater trochanteric bursitis[M70.61]  Myalgia other site    PHYSICIAN: Ras Caballero MD  ASSISTANTS: None    SEDATION: Conscious sedation provided by M.D    The patient was monitored with continuous pulse oximetry, EKG, and intermittent blood pressure monitors.  The patient was hemodynamically stable throughout the entire process was responsive to voice, and breathing spontaneously.  Supplemental O2 was provided at 2L/min via nasal cannula.  Patient was comfortable for the duration of the procedure. (See nurse documentation and case log for sedation time)    There was a total of 2mg IV Midazolam and 50mcg Fentanyl titrated for the procedure        MEDICATIONS INJECTED: 0.5mL 40mg/ml Depo-Medrol and 2mL Bupivacaine 0.25% into each sacroiliac joint, 0.25 mL Depo-Medrol 40mg and 2.25 mL bupivacaine 0.25% into all other site.    LOCAL ANESTHETIC USED: Xylocaine 1% 6ml     ESTIMATED BLOOD LOSS: None.   COMPLICATIONS: None.     TECHNIQUE:   Greater trochanteric bursa injection:  The area overlying the greater trochanteric bursa was identified using fluoroscopy, and the area overlying the skin was prepped and draped in usual sterile fashion. Local Xylocaine was injected by raising a wheel and going down to the periosteum using a 27-gauge hypodermic needle. A 5 inch 22-gauge spinal needle was introduce into the Bilateral greater trochanteric bursa Negative pressure applied to confirm no intravascular placement. Omnipaque was injected to confirm placement and to confirm that there was no vascular runoff. The medication was then injected slowly.  Displacement of the contrast after injection of  "the medication confirmed that the medication went into the area of the greater trochanteric bursa    Piriformis trigger point injection:  The area overlying the bilateral piriformis muscle was identified under fluoroscopy in the AP view, then 5 mL 1% lidocaine was injected into the subcutaneous tissue and deeper tissues over this area through a 25 gauge needle 1.5" needle. The right hip joint was visualized in an AP view. The tip of a 22-gauge 3 1/2 inch Quincke-type spinal needle was advanced 3 cm inferior and 3 cm lateral to the inferior aspect of the SI joint.  Once the piriformis muscle was thought to be encountered, 3 ml of Omnipaque 300 contrast agent was slowly injected. Confirmation of spread of contrast agent within the piriformis muscle was made in the AP fluoroscopic view. Subsequently, the medication mixture was slowly administered. Displacement of the radio opaque contrast after injection of the medication confirmed that the medication went into the area of the piriformis muscle. The needle was removed and bleeding was nil.  A sterile dressing was applied. Marcie was taken back to the recovery room for further observation.     Sacroiliac joint injection:   Laying in the prone position, the patient was prepped and draped in the usual sterile fashion using ChloraPrep and fenestrated drape.  The area was determined under fluoroscopy.  Local Xylocaine was injected by raising a wheel and going down to the periosteum using a 27-gauge hypodermic needle.  The 3.5 inch 22-gauge spinal needle was introduce into the Bilateral sacroiliac joint.  Negative pressure applied to confirm no intravascular placement.  Omnipaque was injected to confirm placement and to confirm that there was no vascular runoff.  The medication was then injected slowly.  The patient tolerated the procedure well.                       The patient was monitored for approximately 30 minutes after the procedure. Patient was given post procedure " and discharge instructions to follow at home. We will see the patient back in two weeks or the patient may call to inform of status. The patient was discharged in a stable condition

## 2022-04-26 ENCOUNTER — PATIENT MESSAGE (OUTPATIENT)
Dept: TRANSPLANT | Facility: CLINIC | Age: 57
End: 2022-04-26
Payer: MEDICAID

## 2022-04-26 ENCOUNTER — TELEPHONE (OUTPATIENT)
Dept: TRANSPLANT | Facility: CLINIC | Age: 57
End: 2022-04-26
Payer: MEDICAID

## 2022-04-26 NOTE — TELEPHONE ENCOUNTER
Letter sent to patient stating: Your labs have been reviewed by your Transplant physician, no action required. Next labs due 5/30/22        ----- Message from Joselin Souza MD sent at 4/25/2022 11:01 PM CDT -----  Alk phos elevated but other labs ok, repeat labs in a month and check GGT

## 2022-04-26 NOTE — LETTER
April 26, 2022    Marcie Bazan  [address] [facility] [address] [address]          Dear Marcie Bazan:  MRN: [id number]    This is a follow up to your recent labs, your lab results were stable.  There are no medicine changes.  Please have your labs drawn again on 5/30/22.      If you cannot have your labs drawn on the scheduled date, it is your responsibility to call the transplant department to reschedule.  Please call [phone] and ask to speak to Mica PURVIS -  for all scheduling requests.     Sincerely,    Viola SANDHUN, RN        Your Liver Transplant Coordinator    [facility]  [address] [place] [id number] [phone]

## 2022-05-04 ENCOUNTER — PATIENT MESSAGE (OUTPATIENT)
Dept: INTERNAL MEDICINE | Facility: CLINIC | Age: 57
End: 2022-05-04
Payer: MEDICAID

## 2022-05-04 DIAGNOSIS — I10 ESSENTIAL HYPERTENSION: Chronic | ICD-10-CM

## 2022-05-05 NOTE — TELEPHONE ENCOUNTER
No new care gaps identified.  [facility] Embedded Care Gaps. Reference number: [id number]. 5/05/2022   8:01:00 AM CDT

## 2022-05-09 ENCOUNTER — PATIENT MESSAGE (OUTPATIENT)
Dept: INTERNAL MEDICINE | Facility: CLINIC | Age: 57
End: 2022-05-09
Payer: MEDICAID

## 2022-05-09 DIAGNOSIS — I10 ESSENTIAL HYPERTENSION: Chronic | ICD-10-CM

## 2022-05-10 ENCOUNTER — PATIENT MESSAGE (OUTPATIENT)
Dept: INTERNAL MEDICINE | Facility: CLINIC | Age: 57
End: 2022-05-10
Payer: MEDICAID

## 2022-05-10 RX ORDER — NIFEDIPINE 60 MG/1
TABLET, EXTENDED RELEASE ORAL
Qty: 90 TABLET | Refills: 0 | Status: SHIPPED | OUTPATIENT
Start: 2022-05-10 | End: 2022-06-16 | Stop reason: SDUPTHER

## 2022-05-10 RX ORDER — NIFEDIPINE 60 MG/1
TABLET, EXTENDED RELEASE ORAL
Qty: 90 TABLET | Refills: 0 | OUTPATIENT
Start: 2022-05-10

## 2022-05-10 NOTE — TELEPHONE ENCOUNTER
No new care gaps identified.  [facility] Embedded Care Gaps. Reference number: [id number]. 5/10/2022   7:46:17 AM CDT

## 2022-05-11 ENCOUNTER — PATIENT MESSAGE (OUTPATIENT)
Dept: RESEARCH | Facility: CLINIC | Age: 57
End: 2022-05-11
Payer: MEDICAID

## 2022-05-12 NOTE — TELEPHONE ENCOUNTER
She requested gynecology referral for a Pap test.    Her surgical history includes hysterectomy.  Assuming her hysterectomy was for benign (noncancerous) reason, then continued Pap tests (cervical cancer screenings) are not needed, because she no longer has a cervix.  If she is having any gynecological complaints and wants to see a gynecologist to address these, I am happy to assist her.    I think you can help her schedule the appointment without a referral order.  If you need a referral or, I am happy to provide one.    TO MY TEAM:    Please relay message above.   Also, inform her that her Hypertension Digital Medicine program BP readings are not transferring. (We don't have any readings since February.) Instruct her to call the [facility] Help Desk [phone] for support.   Finally, let her know I've asked the Digital Medicine team to work with her to keep her cholesterol levels in the healthy range. The Digital Medicine Help Desk can help her get that set up as well.    Thanks!     Orders Placed This Encounter   Procedures    Lipids Digital Medicine (LDMP) Enrollment Order       Last 5 Patient Entered Readings                Current 30 Day Average:   Recent Readings 2/11/2022 2/9/2022 2/7/2022 2/6/2022 2/5/2022   SBP (mmHg) 120 110 87 123 123   DBP (mmHg) 86 82 59 78 77   Pulse 86 80 81 80 85

## 2022-05-12 NOTE — TELEPHONE ENCOUNTER
Spoke with patient and informed her of message from Dr. Munoz regarding the pap smear and the need to call the digital medicine team. She verbalized understanding.

## 2022-06-07 ENCOUNTER — LAB VISIT (OUTPATIENT)
Dept: LAB | Facility: HOSPITAL | Age: 57
End: 2022-06-07
Attending: FAMILY MEDICINE
Payer: MEDICAID

## 2022-06-07 DIAGNOSIS — Z94.4 LIVER REPLACED BY TRANSPLANT: ICD-10-CM

## 2022-06-07 LAB
ALBUMIN SERPL BCP-MCNC: 3.7 G/DL (ref 3.5–5.2)
ALP SERPL-CCNC: 181 U/L (ref 55–135)
ALT SERPL W/O P-5'-P-CCNC: 8 U/L (ref 10–44)
ANION GAP SERPL CALC-SCNC: 10 MMOL/L (ref 8–16)
AST SERPL-CCNC: 10 U/L (ref 10–40)
BASOPHILS # BLD AUTO: 0.04 K/UL (ref 0–0.2)
BASOPHILS NFR BLD: 0.4 % (ref 0–1.9)
BILIRUB SERPL-MCNC: 0.6 MG/DL (ref 0.1–1)
BUN SERPL-MCNC: 10 MG/DL (ref 6–20)
CALCIUM SERPL-MCNC: 9 MG/DL (ref 8.7–10.5)
CHLORIDE SERPL-SCNC: 104 MMOL/L (ref 95–110)
CO2 SERPL-SCNC: 27 MMOL/L (ref 23–29)
CREAT SERPL-MCNC: 0.9 MG/DL (ref 0.5–1.4)
DIFFERENTIAL METHOD: ABNORMAL
EOSINOPHIL # BLD AUTO: 0.2 K/UL (ref 0–0.5)
EOSINOPHIL NFR BLD: 2.4 % (ref 0–8)
ERYTHROCYTE [DISTWIDTH] IN BLOOD BY AUTOMATED COUNT: 14.5 % (ref 11.5–14.5)
EST. GFR  (AFRICAN AMERICAN): >60 ML/MIN/1.73 M^2
EST. GFR  (NON AFRICAN AMERICAN): >60 ML/MIN/1.73 M^2
GLUCOSE SERPL-MCNC: 94 MG/DL (ref 70–110)
HCT VFR BLD AUTO: 41.1 % (ref 37–48.5)
HGB BLD-MCNC: 12.6 G/DL (ref 12–16)
IMM GRANULOCYTES # BLD AUTO: 0.02 K/UL (ref 0–0.04)
IMM GRANULOCYTES NFR BLD AUTO: 0.2 % (ref 0–0.5)
LYMPHOCYTES # BLD AUTO: 2.1 K/UL (ref 1–4.8)
LYMPHOCYTES NFR BLD: 22.6 % (ref 18–48)
MCH RBC QN AUTO: 26.4 PG (ref 27–31)
MCHC RBC AUTO-ENTMCNC: 30.7 G/DL (ref 32–36)
MCV RBC AUTO: 86 FL (ref 82–98)
MONOCYTES # BLD AUTO: 0.6 K/UL (ref 0.3–1)
MONOCYTES NFR BLD: 6.4 % (ref 4–15)
NEUTROPHILS # BLD AUTO: 6.2 K/UL (ref 1.8–7.7)
NEUTROPHILS NFR BLD: 68 % (ref 38–73)
NRBC BLD-RTO: 0 /100 WBC
PLATELET # BLD AUTO: 73 K/UL (ref 150–450)
PMV BLD AUTO: 11.4 FL (ref 9.2–12.9)
POTASSIUM SERPL-SCNC: 3.8 MMOL/L (ref 3.5–5.1)
PROT SERPL-MCNC: 6.9 G/DL (ref 6–8.4)
RBC # BLD AUTO: 4.78 M/UL (ref 4–5.4)
SODIUM SERPL-SCNC: 141 MMOL/L (ref 136–145)
WBC # BLD AUTO: 9.16 K/UL (ref 3.9–12.7)

## 2022-06-07 PROCEDURE — 80053 COMPREHEN METABOLIC PANEL: CPT | Performed by: INTERNAL MEDICINE

## 2022-06-07 PROCEDURE — 36415 COLL VENOUS BLD VENIPUNCTURE: CPT | Performed by: INTERNAL MEDICINE

## 2022-06-07 PROCEDURE — 80197 ASSAY OF TACROLIMUS: CPT | Performed by: INTERNAL MEDICINE

## 2022-06-07 PROCEDURE — 85025 COMPLETE CBC W/AUTO DIFF WBC: CPT | Performed by: INTERNAL MEDICINE

## 2022-06-08 ENCOUNTER — TELEPHONE (OUTPATIENT)
Dept: PAIN MEDICINE | Facility: CLINIC | Age: 57
End: 2022-06-08
Payer: MEDICAID

## 2022-06-08 ENCOUNTER — PATIENT MESSAGE (OUTPATIENT)
Dept: PAIN MEDICINE | Facility: CLINIC | Age: 57
End: 2022-06-08
Payer: MEDICAID

## 2022-06-08 LAB — TACROLIMUS BLD-MCNC: 6.2 NG/ML (ref 5–15)

## 2022-06-08 NOTE — TELEPHONE ENCOUNTER
----- Message from Lillian Zhu sent at 6/8/2022  4:06 PM CDT -----  Contact: [phone]  Type:  Sooner Apoointment Request    Caller is requesting a sooner appointment.  Caller declined first available appointment listed below.  Caller will not accept being placed on the waitlist and is requesting a message be sent to doctor.  Name of Caller:Marcie   When is the first available appointment?8/2022   Symptoms: f/u   Would the patient rather a call back or [facility]? Call back   Best Call Back Number:[phone]  Additional Information:

## 2022-06-09 ENCOUNTER — TELEPHONE (OUTPATIENT)
Dept: TRANSPLANT | Facility: CLINIC | Age: 57
End: 2022-06-09
Payer: MEDICAID

## 2022-06-09 NOTE — LETTER
June 9, 2022    Marcie Bazan  [address] [facility] [address]          Dear Marcie Bazan:  MRN: [id number]    This is a follow up to your recent labs, your lab results were stable.  There are no medicine changes.  Please have your labs drawn again on 9/19/22.  May ignore the July lab date.    If you cannot have your labs drawn on the scheduled date, it is your responsibility to call the transplant department to reschedule.  Please call [phone] and ask to speak to Mica PURVIS -  for all scheduling requests.     Sincerely,    Viola SANDHUN, RN        Your Liver Transplant Coordinator    [facility]  [address] [place] [phone]

## 2022-06-09 NOTE — TELEPHONE ENCOUNTER
Letter sent to patient stating: Your labs have been reviewed by your Transplant physician, no action required. Next labs due 9/19/22          ----- Message from Joselin Souza MD sent at 6/9/2022 12:34 PM CDT -----  Reviewed, nothing to do; repeat per routine

## 2022-06-16 ENCOUNTER — OFFICE VISIT (OUTPATIENT)
Dept: INTERNAL MEDICINE | Facility: CLINIC | Age: 57
End: 2022-06-16
Payer: MEDICAID

## 2022-06-16 VITALS
SYSTOLIC BLOOD PRESSURE: 124 MMHG | HEART RATE: 93 BPM | BODY MASS INDEX: 34.6 KG/M2 | DIASTOLIC BLOOD PRESSURE: 86 MMHG | WEIGHT: 207.88 LBS | OXYGEN SATURATION: 100 % | TEMPERATURE: 98 F

## 2022-06-16 DIAGNOSIS — E87.6 DIURETIC-INDUCED HYPOKALEMIA: ICD-10-CM

## 2022-06-16 DIAGNOSIS — Z00.00 PREVENTATIVE HEALTH CARE: Primary | ICD-10-CM

## 2022-06-16 DIAGNOSIS — Z01.419 PAP SMEAR, AS PART OF ROUTINE GYNECOLOGICAL EXAMINATION: ICD-10-CM

## 2022-06-16 DIAGNOSIS — T50.2X5A DIURETIC-INDUCED HYPOKALEMIA: ICD-10-CM

## 2022-06-16 DIAGNOSIS — E78.2 MIXED HYPERLIPIDEMIA: ICD-10-CM

## 2022-06-16 DIAGNOSIS — Z12.31 BREAST CANCER SCREENING BY MAMMOGRAM: ICD-10-CM

## 2022-06-16 DIAGNOSIS — E55.9 VITAMIN D DEFICIENCY: ICD-10-CM

## 2022-06-16 DIAGNOSIS — Z23 NEED FOR COVID-19 VACCINE: ICD-10-CM

## 2022-06-16 DIAGNOSIS — M54.81 OCCIPITAL NEURALGIA OF LEFT SIDE: ICD-10-CM

## 2022-06-16 DIAGNOSIS — R87.629 ABNORMAL VAGINAL PAP SMEAR: ICD-10-CM

## 2022-06-16 DIAGNOSIS — I10 ESSENTIAL HYPERTENSION: Chronic | ICD-10-CM

## 2022-06-16 DIAGNOSIS — K21.00 GASTROESOPHAGEAL REFLUX DISEASE WITH ESOPHAGITIS WITHOUT HEMORRHAGE: ICD-10-CM

## 2022-06-16 PROCEDURE — 1160F RVW MEDS BY RX/DR IN RCRD: CPT | Mod: CPTII,,, | Performed by: FAMILY MEDICINE

## 2022-06-16 PROCEDURE — 4010F ACE/ARB THERAPY RXD/TAKEN: CPT | Mod: CPTII,,, | Performed by: FAMILY MEDICINE

## 2022-06-16 PROCEDURE — 99999 PR PBB SHADOW E&M-EST. PATIENT-LVL IV: ICD-10-PCS | Mod: PBBFAC,,, | Performed by: FAMILY MEDICINE

## 2022-06-16 PROCEDURE — 3074F PR MOST RECENT SYSTOLIC BLOOD PRESSURE < 130 MM HG: ICD-10-PCS | Mod: CPTII,,, | Performed by: FAMILY MEDICINE

## 2022-06-16 PROCEDURE — 99396 PR PREVENTIVE VISIT,EST,40-64: ICD-10-PCS | Mod: S$PBB,,, | Performed by: FAMILY MEDICINE

## 2022-06-16 PROCEDURE — 4010F PR ACE/ARB THEARPY RXD/TAKEN: ICD-10-PCS | Mod: CPTII,,, | Performed by: FAMILY MEDICINE

## 2022-06-16 PROCEDURE — 99214 OFFICE O/P EST MOD 30 MIN: CPT | Mod: PBBFAC | Performed by: FAMILY MEDICINE

## 2022-06-16 PROCEDURE — 99999 PR PBB SHADOW E&M-EST. PATIENT-LVL IV: CPT | Mod: PBBFAC,,, | Performed by: FAMILY MEDICINE

## 2022-06-16 PROCEDURE — 3079F PR MOST RECENT DIASTOLIC BLOOD PRESSURE 80-89 MM HG: ICD-10-PCS | Mod: CPTII,,, | Performed by: FAMILY MEDICINE

## 2022-06-16 PROCEDURE — 3008F PR BODY MASS INDEX (BMI) DOCUMENTED: ICD-10-PCS | Mod: CPTII,,, | Performed by: FAMILY MEDICINE

## 2022-06-16 PROCEDURE — 1159F MED LIST DOCD IN RCRD: CPT | Mod: CPTII,,, | Performed by: FAMILY MEDICINE

## 2022-06-16 PROCEDURE — 3074F SYST BP LT 130 MM HG: CPT | Mod: CPTII,,, | Performed by: FAMILY MEDICINE

## 2022-06-16 PROCEDURE — 3079F DIAST BP 80-89 MM HG: CPT | Mod: CPTII,,, | Performed by: FAMILY MEDICINE

## 2022-06-16 PROCEDURE — 1160F PR REVIEW ALL MEDS BY PRESCRIBER/CLIN PHARMACIST DOCUMENTED: ICD-10-PCS | Mod: CPTII,,, | Performed by: FAMILY MEDICINE

## 2022-06-16 PROCEDURE — 1159F PR MEDICATION LIST DOCUMENTED IN MEDICAL RECORD: ICD-10-PCS | Mod: CPTII,,, | Performed by: FAMILY MEDICINE

## 2022-06-16 PROCEDURE — 99396 PREV VISIT EST AGE 40-64: CPT | Mod: S$PBB,,, | Performed by: FAMILY MEDICINE

## 2022-06-16 PROCEDURE — 3008F BODY MASS INDEX DOCD: CPT | Mod: CPTII,,, | Performed by: FAMILY MEDICINE

## 2022-06-16 RX ORDER — ATORVASTATIN CALCIUM 40 MG/1
40 TABLET, FILM COATED ORAL NIGHTLY
Qty: 90 TABLET | Refills: 2 | Status: SHIPPED | OUTPATIENT
Start: 2022-06-16 | End: 2023-03-28 | Stop reason: SDUPTHER

## 2022-06-16 RX ORDER — LOSARTAN POTASSIUM 25 MG/1
25 TABLET ORAL DAILY
Qty: 90 TABLET | Refills: 3 | Status: SHIPPED | OUTPATIENT
Start: 2022-06-16 | End: 2023-03-28 | Stop reason: SDUPTHER

## 2022-06-16 RX ORDER — ATORVASTATIN CALCIUM 40 MG/1
40 TABLET, FILM COATED ORAL NIGHTLY
Qty: 90 TABLET | Refills: 1 | Status: SHIPPED | OUTPATIENT
Start: 2022-06-16 | End: 2022-06-16 | Stop reason: SDUPTHER

## 2022-06-16 RX ORDER — PANTOPRAZOLE SODIUM 40 MG/1
40 TABLET, DELAYED RELEASE ORAL DAILY
Qty: 90 TABLET | Refills: 3 | Status: SHIPPED | OUTPATIENT
Start: 2022-06-16 | End: 2022-12-09

## 2022-06-16 RX ORDER — FAMOTIDINE 20 MG/1
20 TABLET, FILM COATED ORAL 2 TIMES DAILY
Qty: 90 TABLET | Refills: 3 | Status: SHIPPED | OUTPATIENT
Start: 2022-06-16 | End: 2022-09-07 | Stop reason: SDUPTHER

## 2022-06-16 RX ORDER — ERGOCALCIFEROL 1.25 MG/1
50000 CAPSULE ORAL
Qty: 12 CAPSULE | Refills: 3 | Status: SHIPPED | OUTPATIENT
Start: 2022-06-16 | End: 2022-09-07 | Stop reason: SDUPTHER

## 2022-06-16 RX ORDER — NIFEDIPINE 60 MG/1
TABLET, EXTENDED RELEASE ORAL
Qty: 90 TABLET | Refills: 3 | Status: SHIPPED | OUTPATIENT
Start: 2022-06-16 | End: 2023-03-28 | Stop reason: SDUPTHER

## 2022-06-16 NOTE — PROGRESS NOTES
WELLNESS VISIT (PREVENTIVE SERVICES)  6/16/22  2:00 PM CDT    HEALTH MAINTENANCE INTERVENTIONS - UP TO DATE  Health Maintenance Topics with due status: Not Due       Topic Last Completion Date    TETANUS VACCINE 01/10/2013    Colorectal Cancer Screening 12/05/2017    Pneumococcal Vaccines (Age 0-64) 09/11/2020    Lipid Panel 12/13/2021       HEALTH MAINTENANCE INTERVENTIONS - DUE OR DUE SOON  Health Maintenance Due   Topic Date Due    COVID-19 Vaccine (4 - Booster for Pfizer series) 01/06/2022    Mammogram  08/05/2022       CHIEF COMPLAINT: Annual Wellness Visit (Preventive Services)    DIAGNOSES SPECIFICALLY EVALUATED AND TREATED THIS ENCOUNTER  1. Preventative health care    2. Breast cancer screening by mammogram  - Mammo Digital Screening Bilat w/ Kelvin; Future    3. Mixed hyperlipidemia  - atorvastatin (LIPITOR) 40 MG tablet; Take 1 tablet (40 mg total) by mouth every evening.  Dispense: 90 tablet; Refill: 2    4. Essential hypertension  - NIFEdipine (PROCARDIA-XL) 60 MG (OSM) 24 hr tablet; TAKE 1 TABLET(60 MG) BY MOUTH EVERY EVENING  Dispense: 90 tablet; Refill: 3  - losartan (COZAAR) 25 MG tablet; Take 1 tablet (25 mg total) by mouth once daily.  Dispense: 90 tablet; Refill: 3    5. Vitamin D deficiency  - ergocalciferol (ERGOCALCIFEROL) 50,000 unit Cap; Take 1 capsule (50,000 Units total) by mouth every 7 days.  Dispense: 12 capsule; Refill: 3    6. Gastroesophageal reflux disease with esophagitis without hemorrhage  - famotidine (PEPCID) 20 MG tablet; Take 1 tablet (20 mg total) by mouth 2 (two) times a day.  Dispense: 90 tablet; Refill: 3  - pantoprazole (PROTONIX) 40 MG tablet; Take 1 tablet (40 mg total) by mouth once daily.  Dispense: 90 tablet; Refill: 3    7. Occipital neuralgia of left side    8. Pap smear, as part of routine gynecological examination  - Ambulatory referral/consult to Gynecology; Future    9. Abnormal vaginal Pap smear  - Ambulatory referral/consult to Gynecology; Future    10. Need  for COVID-19 vaccine    11. Diuretic-induced hypokalemia    Follow up in about 6 months (around 12/16/2022) for re-evaluate problem(s) discussed today.   Future Appointments   Date Time Provider Department Center   6/24/2022  8:40 AM Emilee Buitrago PA-C [facility]   7/20/2022 10:40 AM LABORATORY, [facility]   7/20/2022 11:00 AM Baldo Kim MD [facility] HEM [facility]   1/18/2023 12:40 PM Elizabeth Lejeune, NP [facility] SLEEP [facility]     Problem List Items Addressed This Visit     Essential hypertension (Chronic)    Relevant Medications    NIFEdipine (PROCARDIA-XL) 60 MG (OSM) 24 hr tablet    losartan (COZAAR) 25 MG tablet    Occipital neuralgia of left side (Chronic)    Current Assessment & Plan     Onset over last few months. Classic symptoms. Gradually becoming more frequent. PLAN: D/W Pain management.  Future Appointments   Date Time Provider Department Center   6/24/2022  8:40 AM Emilee Buitrago PA-C [facility]               Gastroesophageal reflux disease with esophagitis without hemorrhage    Relevant Medications    famotidine (PEPCID) 20 MG tablet    pantoprazole (PROTONIX) 40 MG tablet    Mixed hyperlipidemia    Relevant Medications    atorvastatin (LIPITOR) 40 MG tablet    Diuretic-induced hypokalemia    Current Assessment & Plan     Lab Results   Component Value Date    K 3.8 06/07/2022    K 4.8 04/20/2022    K 3.5 01/14/2022    ESTGFRAFRICA >60.0 06/07/2022    EGFRNONAA >60.0 06/07/2022    CREATININE 0.9 06/07/2022    BUN 10 06/07/2022               Vitamin D deficiency    Current Assessment & Plan     Lab Results   Component Value Date    [id number] 37 12/13/2021    [id number] 33 10/30/2020    [id number] 21 (L) 09/19/2018               Relevant Medications    ergocalciferol (ERGOCALCIFEROL) 50,000 unit Cap      Other Visit Diagnoses     Preventative health care    -  Primary    Breast cancer screening by mammogram        Relevant Orders    Mammo  Digital Screening Bilat w/ Kelvin    Pap smear, as part of routine gynecological examination        Relevant Orders    Ambulatory referral/consult to Gynecology    Abnormal vaginal Pap smear        Relevant Orders    Ambulatory referral/consult to Gynecology    Need for COVID-19 vaccine          Unless noted herein, any chronic conditions are represented as and appear compensated/controlled and stable, and no other significant complaints or concerns were reported.    PRESCRIPTION DRUG MANAGEMENT  Outpatient Medications Prior to Visit   Medication Sig Dispense Refill    amitriptyline (ELAVIL) 10 MG tablet TAKE 1 TABLET(10 MG) BY MOUTH EVERY NIGHT AS NEEDED FOR INSOMNIA OR PAIN 30 tablet 5    cyclobenzaprine (FLEXERIL) 10 MG tablet TAKE 1/2 TO 1 TABLET BY MOUTH THREE TIMES DAILY AS NEEDED FOR MUSCLE SPASMS 90 tablet 5    furosemide (LASIX) 40 MG tablet TAKE 1 TABLET(40 MG) BY MOUTH EVERY DAY 30 tablet 5    gabapentin (NEURONTIN) 300 MG capsule Take 1 capsule (300 mg total) by mouth 3 (three) times daily. With 600mg cap to equal 900mg  capsule 5    gabapentin (NEURONTIN) 600 MG tablet Take 1 tablet (600 mg total) by mouth 3 (three) times daily. With 300mg cap to equal 900mg TID 90 tablet 5    magnesium oxide (MAG-OX) 400 mg (241.3 mg magnesium) tablet Take 1 tablet (400 mg total) by mouth 2 (two) times daily. 60 tablet 2    potassium chloride (KLOR-CON) 10 MEQ TbSR Take 1 tablet (10 mEq total) by mouth 2 (two) times daily. 180 tablet 2    tacrolimus (PROGRAF) 1 MG Cap Take 2 capsules (2 mg total) by mouth every morning AND 1 capsule (1 mg total) every evening. 90 capsule 11    atorvastatin (LIPITOR) 40 MG tablet TAKE 1 TABLET BY MOUTH EVERY EVENING 90 tablet 3    ergocalciferol (ERGOCALCIFEROL) 50,000 unit Cap TAKE 1 CAPSULE BY MOUTH EVERY 7 DAYS 12 capsule 2    famotidine (PEPCID) 20 MG tablet Take 1 tablet (20 mg total) by mouth 2 (two) times a day. 90 tablet 3    losartan (COZAAR) 25 MG tablet TAKE  1 TABLET(25 MG) BY MOUTH EVERY DAY 30 tablet 11    NIFEdipine (PROCARDIA-XL) 60 MG (OSM) 24 hr tablet TAKE 1 TABLET(60 MG) BY MOUTH EVERY EVENING 90 tablet 0    pantoprazole (PROTONIX) 40 MG tablet Take 1 tablet (40 mg total) by mouth once daily. 90 tablet 3    chlorthalidone (HYGROTEN) 25 MG Tab       diclofenac sodium (VOLTAREN) 1 % Gel Apply 2 g topically 2 (two) times daily. 100 g 0    varicella-zoster gE-AS01B, PF, (SHINGRIX) 50 mcg/0.5 mL injection Inject 0.5 mL (one dose) into muscle now; give second dose at least 2 months later (Patient not taking: No sig reported) 1 each 1    varicella-zoster gE-AS01B, PF, (SHINGRIX, PF,) 50 mcg/0.5 mL injection Inject into the muscle as directed (Patient not taking: No sig reported) 1 each 0     Facility-Administered Medications Prior to Visit   Medication Dose Route Frequency Provider Last Rate Last Admin    ondansetron injection 4 mg  4 mg Intravenous Once PRN Ras Caballero MD         Medications Discontinued During This Encounter   Medication Reason    diclofenac sodium (VOLTAREN) 1 % Gel Patient no longer taking    chlorthalidone (HYGROTEN) 25 MG Tab Patient no longer taking    varicella-zoster gE-AS01B, PF, (SHINGRIX) 50 mcg/0.5 mL injection Patient no longer taking    varicella-zoster gE-AS01B, PF, (SHINGRIX, PF,) 50 mcg/0.5 mL injection Patient no longer taking    ergocalciferol (ERGOCALCIFEROL) 50,000 unit Cap Reorder    famotidine (PEPCID) 20 MG tablet Reorder    pantoprazole (PROTONIX) 40 MG tablet Reorder    losartan (COZAAR) 25 MG tablet Reorder    atorvastatin (LIPITOR) 40 MG tablet Reorder    NIFEdipine (PROCARDIA-XL) 60 MG (OSM) 24 hr tablet Reorder    atorvastatin (LIPITOR) 40 MG tablet Reorder     Medications Ordered This Encounter   Medications    atorvastatin (LIPITOR) 40 MG tablet     Sig: Take 1 tablet (40 mg total) by mouth every evening.     Dispense:  90 tablet     Refill:  2    ergocalciferol (ERGOCALCIFEROL) 50,000 unit Cap      Sig: Take 1 capsule (50,000 Units total) by mouth every 7 days.     Dispense:  12 capsule     Refill:  3     -    famotidine (PEPCID) 20 MG tablet     Sig: Take 1 tablet (20 mg total) by mouth 2 (two) times a day.     Dispense:  90 tablet     Refill:  3     -    losartan (COZAAR) 25 MG tablet     Sig: Take 1 tablet (25 mg total) by mouth once daily.     Dispense:  90 tablet     Refill:  3     -    NIFEdipine (PROCARDIA-XL) 60 MG (OSM) 24 hr tablet     Sig: TAKE 1 TABLET(60 MG) BY MOUTH EVERY EVENING     Dispense:  90 tablet     Refill:  3     -    pantoprazole (PROTONIX) 40 MG tablet     Sig: Take 1 tablet (40 mg total) by mouth once daily.     Dispense:  90 tablet     Refill:  3     -     PHYSICAL EXAM  Vitals:    06/16/22 1327   BP: 124/86   BP Location: Left arm   Patient Position: Sitting   BP Method: Large (Manual)   Pulse: 93   Temp: 98.4 °F (36.9 °C)   TempSrc: Oral   SpO2: 100%   Weight: 94.3 kg (207 lb 14.3 oz)   CONSTITUTIONAL: Vital signs noted. No apparent distress. Does not appear acutely ill or septic. Appears adequately hydrated.  PULM: Lungs clear. Breathing unlabored.  HEART: Regular.  DERM: Skin warm and moist with normal turgor.  NEURO: There are no gross focal motor deficits.  PSYCHIATRIC: Alert and conversant. Mood grossly neutral. Judgment and insight grossly intact.    PAST MEDICAL HISTORY  Marcie has a past medical history of Alcohol abuse, Alcoholic hepatitis, Anemia of chronic disease, Arthritis, Cancer, Coagulopathy, Dyspnea on exertion, Encounter for blood transfusion, End stage liver disease, History of alcohol abuse, Hyperlipidemia, Hypersomnia, Hypertension, Hyponatremia, Liver cirrhosis, Liver transplant candidate, Obesity (BMI 30-39.9), and Sleep apnea.    SURGICAL HISTORY  Marcie has a past surgical history that includes Cholecystectomy; Hysterectomy; Breast biopsy (Left); Colonoscopy (N/A, 12/1/2017); Colonoscopy (N/A, 12/5/2017); Liver transplant (12/2017); Injection of  piriformis muscle (Right, 6/14/2019); Injection of anesthetic agent into sacroiliac joint (Right, 6/14/2019); Injection of joint (Bilateral, 2/7/2020); Injection of anesthetic agent into sacroiliac joint (Bilateral, 2/7/2020); Excisional hemorrhoidectomy (N/A, 2/18/2020); Injection of anesthetic agent around pudendal nerve (N/A, 2/18/2020); Examination under anesthesia (N/A, 2/18/2020); Injection of joint (Bilateral, 7/7/2020); Injection of anesthetic agent into sacroiliac joint (Bilateral, 7/7/2020); Injection of piriformis muscle (Right, 10/21/2020); Injection of anesthetic agent into sacroiliac joint (Right, 10/21/2020); Injection of joint (Right, 10/21/2020); Injection of joint (Bilateral, 2/8/2021); Injection of piriformis muscle (Bilateral, 4/15/2021); Injection of anesthetic agent into sacroiliac joint (Bilateral, 4/15/2021); Injection of joint (Bilateral, 4/15/2021); Breast lumpectomy; Selective injection of anesthetic agent around lumbar spinal nerve root by transforaminal approach (Bilateral, 10/28/2021); Epidural steroid injection into cervical spine (N/A, 1/4/2022); Injection of piriformis muscle (Bilateral, 4/21/2022); Injection of anesthetic agent into sacroiliac joint (Bilateral, 4/21/2022); and Injection of joint (Bilateral, 4/21/2022).    FAMILY HISTORY  Marcie family history includes Alzheimer's disease in her mother; Breast cancer in her paternal aunt; Depression in her maternal grandfather; Diabetes in her father and sister; Hypertension in her father and mother.     ALLERGIES  Review of patient's allergies indicates:   Allergen Reactions    Ferrous sulfate Other (See Comments)     Patient states the pill makes her sick. She stated she would rather have a shot       SOCIAL HISTORY  Marcie  reports that she has never smoked. She has never used smokeless tobacco. She reports that she does not drink alcohol and does not use drugs.     Documentation entered by me for this encounter may have been done  "in part using speech-recognition technology. Although I have made an effort to ensure accuracy, "sound like" errors may exist and should be interpreted in context.   "

## 2022-06-16 NOTE — PATIENT INSTRUCTIONS
I recommend that you get your fourth COVID-19 vaccine dose.    You can learn more about the COVID-19 vaccine at [email or web address].    The quickest and easiest way to schedule an appointment for your COVID-19 booster vaccination is from the [employer] cristal or your [facility] account online at [email or web address]. You can also schedule an appointment for your COVID-19 vaccination by calling [phone].    Please take care of yourself. You are important to me!

## 2022-06-16 NOTE — ASSESSMENT & PLAN NOTE
Onset over last few months. Classic symptoms. Gradually becoming more frequent. PLAN: D/W Pain management.  Future Appointments   Date Time Provider Department Center   6/24/2022  8:40 AM Emilee Buitrago PA-C [facility]

## 2022-06-16 NOTE — ASSESSMENT & PLAN NOTE
Lab Results   Component Value Date    [id number] 37 12/13/2021    [id number] 33 10/30/2020    [id number] 21 (L) 09/19/2018

## 2022-06-16 NOTE — ASSESSMENT & PLAN NOTE
Lab Results   Component Value Date    K 3.8 06/07/2022    K 4.8 04/20/2022    K 3.5 01/14/2022    ESTGFRAFRICA >60.0 06/07/2022    EGFRNONAA >60.0 06/07/2022    CREATININE 0.9 06/07/2022    BUN 10 06/07/2022

## 2022-06-17 ENCOUNTER — PATIENT MESSAGE (OUTPATIENT)
Dept: INTERNAL MEDICINE | Facility: CLINIC | Age: 57
End: 2022-06-17
Payer: MEDICAID

## 2022-06-24 ENCOUNTER — HOSPITAL ENCOUNTER (OUTPATIENT)
Dept: RADIOLOGY | Facility: HOSPITAL | Age: 57
Discharge: HOME | End: 2022-06-24
Attending: PHYSICIAN ASSISTANT
Payer: MEDICAID

## 2022-06-24 ENCOUNTER — OFFICE VISIT (OUTPATIENT)
Dept: PAIN MEDICINE | Facility: CLINIC | Age: 57
End: 2022-06-24
Payer: MEDICAID

## 2022-06-24 VITALS
HEIGHT: 65 IN | BODY MASS INDEX: 34.97 KG/M2 | HEART RATE: 90 BPM | DIASTOLIC BLOOD PRESSURE: 85 MMHG | WEIGHT: 209.88 LBS | SYSTOLIC BLOOD PRESSURE: 129 MMHG

## 2022-06-24 DIAGNOSIS — M54.16 BILATERAL LUMBAR RADICULOPATHY: ICD-10-CM

## 2022-06-24 DIAGNOSIS — G47.01 INSOMNIA SECONDARY TO CHRONIC PAIN: ICD-10-CM

## 2022-06-24 DIAGNOSIS — G44.86 HEADACHE, CERVICOGENIC: Primary | ICD-10-CM

## 2022-06-24 DIAGNOSIS — G44.89 HEADACHE SYNDROME: ICD-10-CM

## 2022-06-24 DIAGNOSIS — G44.86 HEADACHE, CERVICOGENIC: ICD-10-CM

## 2022-06-24 DIAGNOSIS — G89.29 INSOMNIA SECONDARY TO CHRONIC PAIN: ICD-10-CM

## 2022-06-24 PROCEDURE — 3008F PR BODY MASS INDEX (BMI) DOCUMENTED: ICD-10-PCS | Mod: CPTII,,, | Performed by: PHYSICIAN ASSISTANT

## 2022-06-24 PROCEDURE — 1160F RVW MEDS BY RX/DR IN RCRD: CPT | Mod: CPTII,,, | Performed by: PHYSICIAN ASSISTANT

## 2022-06-24 PROCEDURE — 4010F PR ACE/ARB THEARPY RXD/TAKEN: ICD-10-PCS | Mod: CPTII,,, | Performed by: PHYSICIAN ASSISTANT

## 2022-06-24 PROCEDURE — 1160F PR REVIEW ALL MEDS BY PRESCRIBER/CLIN PHARMACIST DOCUMENTED: ICD-10-PCS | Mod: CPTII,,, | Performed by: PHYSICIAN ASSISTANT

## 2022-06-24 PROCEDURE — 99214 OFFICE O/P EST MOD 30 MIN: CPT | Mod: S$PBB,,, | Performed by: PHYSICIAN ASSISTANT

## 2022-06-24 PROCEDURE — 72052 XR CERVICAL SPINE 5 VIEW WITH FLEX AND EXT: ICD-10-PCS | Mod: 26,,, | Performed by: RADIOLOGY

## 2022-06-24 PROCEDURE — 72052 X-RAY EXAM NECK SPINE 6/>VWS: CPT | Mod: 26,,, | Performed by: RADIOLOGY

## 2022-06-24 PROCEDURE — 3074F SYST BP LT 130 MM HG: CPT | Mod: CPTII,,, | Performed by: PHYSICIAN ASSISTANT

## 2022-06-24 PROCEDURE — 3008F BODY MASS INDEX DOCD: CPT | Mod: CPTII,,, | Performed by: PHYSICIAN ASSISTANT

## 2022-06-24 PROCEDURE — 4010F ACE/ARB THERAPY RXD/TAKEN: CPT | Mod: CPTII,,, | Performed by: PHYSICIAN ASSISTANT

## 2022-06-24 PROCEDURE — 99214 PR OFFICE/OUTPT VISIT, EST, LEVL IV, 30-39 MIN: ICD-10-PCS | Mod: S$PBB,,, | Performed by: PHYSICIAN ASSISTANT

## 2022-06-24 PROCEDURE — 3079F PR MOST RECENT DIASTOLIC BLOOD PRESSURE 80-89 MM HG: ICD-10-PCS | Mod: CPTII,,, | Performed by: PHYSICIAN ASSISTANT

## 2022-06-24 PROCEDURE — 3079F DIAST BP 80-89 MM HG: CPT | Mod: CPTII,,, | Performed by: PHYSICIAN ASSISTANT

## 2022-06-24 PROCEDURE — 1159F MED LIST DOCD IN RCRD: CPT | Mod: CPTII,,, | Performed by: PHYSICIAN ASSISTANT

## 2022-06-24 PROCEDURE — 1159F PR MEDICATION LIST DOCUMENTED IN MEDICAL RECORD: ICD-10-PCS | Mod: CPTII,,, | Performed by: PHYSICIAN ASSISTANT

## 2022-06-24 PROCEDURE — 72052 X-RAY EXAM NECK SPINE 6/>VWS: CPT | Mod: TC

## 2022-06-24 PROCEDURE — 99999 PR PBB SHADOW E&M-EST. PATIENT-LVL V: ICD-10-PCS | Mod: PBBFAC,,, | Performed by: PHYSICIAN ASSISTANT

## 2022-06-24 PROCEDURE — 99999 PR PBB SHADOW E&M-EST. PATIENT-LVL V: CPT | Mod: PBBFAC,,, | Performed by: PHYSICIAN ASSISTANT

## 2022-06-24 PROCEDURE — 99215 OFFICE O/P EST HI 40 MIN: CPT | Mod: PBBFAC | Performed by: PHYSICIAN ASSISTANT

## 2022-06-24 PROCEDURE — 3074F PR MOST RECENT SYSTOLIC BLOOD PRESSURE < 130 MM HG: ICD-10-PCS | Mod: CPTII,,, | Performed by: PHYSICIAN ASSISTANT

## 2022-06-24 RX ORDER — AMITRIPTYLINE HYDROCHLORIDE 25 MG/1
25 TABLET, FILM COATED ORAL NIGHTLY PRN
Qty: 30 TABLET | Refills: 2 | Status: SHIPPED | OUTPATIENT
Start: 2022-06-24 | End: 2022-08-03 | Stop reason: DRUGHIGH
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
## 2022-06-27 ENCOUNTER — TELEPHONE (OUTPATIENT)
Dept: PAIN MEDICINE | Facility: CLINIC | Age: 57
End: 2022-06-27
Payer: MEDICAID

## 2022-06-27 NOTE — TELEPHONE ENCOUNTER
----- Message from Justyn Garcia sent at 6/27/2022  3:14 PM CDT -----  Contact: self  Marcie Bazan would like a call back at [phone], in regards to her pain medication. She states that she was told the there will be a change in the medication.

## 2022-06-29 RX ORDER — PREGABALIN 225 MG/1
225 CAPSULE ORAL 2 TIMES DAILY
Qty: 60 CAPSULE | Refills: 1 | Status: SHIPPED | OUTPATIENT
Start: 2022-06-29 | End: 2022-09-07 | Stop reason: SDUPTHER

## 2022-06-30 ENCOUNTER — TELEPHONE (OUTPATIENT)
Dept: TRANSPLANT | Facility: CLINIC | Age: 57
End: 2022-06-30
Payer: MEDICAID

## 2022-06-30 ENCOUNTER — PATIENT MESSAGE (OUTPATIENT)
Dept: TRANSPLANT | Facility: CLINIC | Age: 57
End: 2022-06-30
Payer: MEDICAID

## 2022-06-30 NOTE — TELEPHONE ENCOUNTER
Writer returned patient call, patient states she was diagnosed at an urgent care with COVID. She was given no scripts of any kinds she reports and calling to see what she can take to help her feel better.  Writer gave patient advice on OTC meds ok to take for now but advised ER if she gets SOB.    Writer notified Txp team and requested EUA order be placed for patient to receive infusion in the [facility].

## 2022-07-01 ENCOUNTER — INFUSION (OUTPATIENT)
Dept: INFECTIOUS DISEASES | Facility: HOSPITAL | Age: 57
End: 2022-07-01
Attending: INTERNAL MEDICINE
Payer: MEDICAID

## 2022-07-01 ENCOUNTER — NURSE TRIAGE (OUTPATIENT)
Dept: ADMINISTRATIVE | Facility: CLINIC | Age: 57
End: 2022-07-01
Payer: MEDICAID

## 2022-07-01 VITALS
DIASTOLIC BLOOD PRESSURE: 75 MMHG | SYSTOLIC BLOOD PRESSURE: 134 MMHG | TEMPERATURE: 98 F | RESPIRATION RATE: 17 BRPM | OXYGEN SATURATION: 98 % | HEART RATE: 81 BPM

## 2022-07-01 DIAGNOSIS — U07.1 COVID: Primary | ICD-10-CM

## 2022-07-01 DIAGNOSIS — U07.1 COVID: ICD-10-CM

## 2022-07-01 PROCEDURE — 63600175 PHARM REV CODE 636 W HCPCS: Performed by: INTERNAL MEDICINE

## 2022-07-01 PROCEDURE — M0222 HC IV INJECTION, BEBTELOVIMAB, INCL POST ADMIN MONIT: HCPCS | Performed by: INTERNAL MEDICINE

## 2022-07-01 RX ORDER — DIPHENHYDRAMINE HYDROCHLORIDE 50 MG/ML
25 INJECTION INTRAMUSCULAR; INTRAVENOUS
Status: ACTIVE | OUTPATIENT
Start: 2022-07-01 | End: 2022-07-02

## 2022-07-01 RX ORDER — BEBTELOVIMAB 87.5 MG/ML
175 INJECTION, SOLUTION INTRAVENOUS
Status: COMPLETED | OUTPATIENT
Start: 2022-07-01 | End: 2022-07-01

## 2022-07-01 RX ORDER — EPINEPHRINE 0.3 MG/.3ML
0.3 INJECTION SUBCUTANEOUS
Status: ACTIVE | OUTPATIENT
Start: 2022-07-01 | End: 2022-07-04

## 2022-07-01 RX ORDER — ALBUTEROL SULFATE 90 UG/1
2 AEROSOL, METERED RESPIRATORY (INHALATION)
Status: ACTIVE | OUTPATIENT
Start: 2022-07-01 | End: 2022-07-04

## 2022-07-01 RX ORDER — ONDANSETRON 4 MG/1
4 TABLET, ORALLY DISINTEGRATING ORAL
Status: ACTIVE | OUTPATIENT
Start: 2022-07-01 | End: 2022-07-02

## 2022-07-01 RX ORDER — ACETAMINOPHEN 325 MG/1
650 TABLET ORAL
Status: ACTIVE | OUTPATIENT
Start: 2022-07-01 | End: 2022-07-02

## 2022-07-01 RX ADMIN — BEBTELOVIMAB 175 MG: 87.5 INJECTION, SOLUTION INTRAVENOUS at 01:07

## 2022-07-01 NOTE — TELEPHONE ENCOUNTER
Liver txpl 12/26/17.     Diagnosed with covid recently. Antibody infusion today. Reports while there, RN noticed pt breathing different, some sob noted per pt. Pt states nasal congestion, breathing through mouth, but still having some sob, often but not limited to movement &/or lying down.     Dispo-[facility]/ER now for eval. Pt vu.     Reason for Disposition   Patient sounds very sick or weak to the triager    Additional Information   Negative: Severe difficulty breathing (e.g., struggling for each breath, speaks in single words)   Negative: Difficult to awaken or acting confused (e.g., disoriented, slurred speech)   Negative: Bluish (or gray) lips or face now   Negative: Shock suspected (e.g., cold/pale/clammy skin, too weak to stand, low BP, rapid pulse)   Negative: Sounds like a life-threatening emergency to the triager   Negative: SEVERE or constant chest pain (Exception: mild central chest pain, present only when coughing)   Negative: MODERATE difficulty breathing (e.g., speaks in phrases, SOB even at rest, pulse 100-120)    Protocols used: CORONAVIRUS (COVID-19) - DIAGNOSED OR [id number]

## 2022-07-05 ENCOUNTER — PATIENT MESSAGE (OUTPATIENT)
Dept: INTERNAL MEDICINE | Facility: CLINIC | Age: 57
End: 2022-07-05
Payer: MEDICAID

## 2022-07-05 ENCOUNTER — PATIENT MESSAGE (OUTPATIENT)
Dept: TRANSPLANT | Facility: CLINIC | Age: 57
End: 2022-07-05
Payer: MEDICAID

## 2022-07-08 ENCOUNTER — OFFICE VISIT (OUTPATIENT)
Dept: URGENT CARE | Facility: CLINIC | Age: 57
End: 2022-07-08
Payer: MEDICAID

## 2022-07-08 ENCOUNTER — HOSPITAL ENCOUNTER (OUTPATIENT)
Dept: RADIOLOGY | Facility: CLINIC | Age: 57
Discharge: HOME | End: 2022-07-08
Attending: PHYSICIAN ASSISTANT
Payer: MEDICAID

## 2022-07-08 ENCOUNTER — PATIENT MESSAGE (OUTPATIENT)
Dept: TRANSPLANT | Facility: CLINIC | Age: 57
End: 2022-07-08
Payer: MEDICAID

## 2022-07-08 ENCOUNTER — TELEPHONE (OUTPATIENT)
Dept: TRANSPLANT | Facility: CLINIC | Age: 57
End: 2022-07-08
Payer: MEDICAID

## 2022-07-08 ENCOUNTER — PATIENT MESSAGE (OUTPATIENT)
Dept: PAIN MEDICINE | Facility: CLINIC | Age: 57
End: 2022-07-08
Payer: MEDICAID

## 2022-07-08 VITALS
TEMPERATURE: 98 F | WEIGHT: 207 LBS | HEART RATE: 91 BPM | HEIGHT: 65 IN | OXYGEN SATURATION: 98 % | BODY MASS INDEX: 34.49 KG/M2 | SYSTOLIC BLOOD PRESSURE: 124 MMHG | DIASTOLIC BLOOD PRESSURE: 76 MMHG | RESPIRATION RATE: 18 BRPM

## 2022-07-08 DIAGNOSIS — U07.1 ACUTE BRONCHITIS DUE TO COVID-19 VIRUS: Primary | ICD-10-CM

## 2022-07-08 DIAGNOSIS — J20.8 ACUTE BRONCHITIS DUE TO COVID-19 VIRUS: ICD-10-CM

## 2022-07-08 DIAGNOSIS — U07.1 ACUTE BRONCHITIS DUE TO COVID-19 VIRUS: ICD-10-CM

## 2022-07-08 DIAGNOSIS — J20.8 ACUTE BRONCHITIS DUE TO COVID-19 VIRUS: Primary | ICD-10-CM

## 2022-07-08 PROCEDURE — 3074F PR MOST RECENT SYSTOLIC BLOOD PRESSURE < 130 MM HG: ICD-10-PCS | Mod: CPTII,S$GLB,, | Performed by: PHYSICIAN ASSISTANT

## 2022-07-08 PROCEDURE — 4010F ACE/ARB THERAPY RXD/TAKEN: CPT | Mod: CPTII,S$GLB,, | Performed by: PHYSICIAN ASSISTANT

## 2022-07-08 PROCEDURE — 3008F PR BODY MASS INDEX (BMI) DOCUMENTED: ICD-10-PCS | Mod: CPTII,S$GLB,, | Performed by: PHYSICIAN ASSISTANT

## 2022-07-08 PROCEDURE — 71046 X-RAY EXAM CHEST 2 VIEWS: CPT | Mod: S$GLB,,, | Performed by: RADIOLOGY

## 2022-07-08 PROCEDURE — 3008F BODY MASS INDEX DOCD: CPT | Mod: CPTII,S$GLB,, | Performed by: PHYSICIAN ASSISTANT

## 2022-07-08 PROCEDURE — 71046 XR CHEST PA AND LATERAL: ICD-10-PCS | Mod: S$GLB,,, | Performed by: RADIOLOGY

## 2022-07-08 PROCEDURE — 3078F DIAST BP <80 MM HG: CPT | Mod: CPTII,S$GLB,, | Performed by: PHYSICIAN ASSISTANT

## 2022-07-08 PROCEDURE — 1159F MED LIST DOCD IN RCRD: CPT | Mod: CPTII,S$GLB,, | Performed by: PHYSICIAN ASSISTANT

## 2022-07-08 PROCEDURE — 99214 PR OFFICE/OUTPT VISIT, EST, LEVL IV, 30-39 MIN: ICD-10-PCS | Mod: S$GLB,,, | Performed by: PHYSICIAN ASSISTANT

## 2022-07-08 PROCEDURE — 3074F SYST BP LT 130 MM HG: CPT | Mod: CPTII,S$GLB,, | Performed by: PHYSICIAN ASSISTANT

## 2022-07-08 PROCEDURE — 3078F PR MOST RECENT DIASTOLIC BLOOD PRESSURE < 80 MM HG: ICD-10-PCS | Mod: CPTII,S$GLB,, | Performed by: PHYSICIAN ASSISTANT

## 2022-07-08 PROCEDURE — 4010F PR ACE/ARB THEARPY RXD/TAKEN: ICD-10-PCS | Mod: CPTII,S$GLB,, | Performed by: PHYSICIAN ASSISTANT

## 2022-07-08 PROCEDURE — 1160F PR REVIEW ALL MEDS BY PRESCRIBER/CLIN PHARMACIST DOCUMENTED: ICD-10-PCS | Mod: CPTII,S$GLB,, | Performed by: PHYSICIAN ASSISTANT

## 2022-07-08 PROCEDURE — 1160F RVW MEDS BY RX/DR IN RCRD: CPT | Mod: CPTII,S$GLB,, | Performed by: PHYSICIAN ASSISTANT

## 2022-07-08 PROCEDURE — 99214 OFFICE O/P EST MOD 30 MIN: CPT | Mod: S$GLB,,, | Performed by: PHYSICIAN ASSISTANT

## 2022-07-08 PROCEDURE — 1159F PR MEDICATION LIST DOCUMENTED IN MEDICAL RECORD: ICD-10-PCS | Mod: CPTII,S$GLB,, | Performed by: PHYSICIAN ASSISTANT

## 2022-07-08 RX ORDER — CODEINE PHOSPHATE AND GUAIFENESIN 10; 100 MG/5ML; MG/5ML
10 SOLUTION ORAL EVERY 6 HOURS PRN
Qty: 230 ML | Refills: 0 | Status: SHIPPED | OUTPATIENT
Start: 2022-07-08 | End: 2022-07-18

## 2022-07-08 RX ORDER — ALBUTEROL SULFATE 90 UG/1
2 AEROSOL, METERED RESPIRATORY (INHALATION) EVERY 6 HOURS PRN
Qty: 18 G | Refills: 0 | Status: SHIPPED | OUTPATIENT
Start: 2022-07-08 | End: 2023-02-09

## 2022-07-08 NOTE — PATIENT INSTRUCTIONS
Take cheratussin cough syrup every 4-6 hours as needed for cough and congestion - contains mucinex. Albuterol inhaler 2-4 puffs every 4-6 hours as needed for wheeze, chest congestion or chest tightness. Rest and drink plenty of fluids.Tylenol or motrin for fever, sore throat or body aches. Flonase 1 spray/nostril daily will help with nasal congestion.     You need urgently re-evaluated for any new persistent fever, progressive chest pain or shortness of breath not relieved by inhaler, or cough not improving > 4 weeks.

## 2022-07-08 NOTE — PROGRESS NOTES
"Subjective:       Patient ID: Marcie Bazan is a 56 y.o. female.    Vitals:  height is 5' 5" (1.651 m) and weight is 93.9 kg (207 lb). Her temperature is 98.1 °F (36.7 °C). Her blood pressure is 124/76 and her pulse is 91. Her respiration is 18 and oxygen saturation is 98%.     Chief Complaint: Cough    Pt c/o cough. Pt states she has been experiencing a very bad cough since her dx of covid. Symptoms started on 6/28/22, received ab infusion on 7/1. No relief with OTC cough syrup. C/o productive cough. Chest tightness occasionally associated with cough. No fever or SOB. Hx of liver transplant on immunosuppressants    Cough  This is a new problem. The current episode started 1 to 4 weeks ago. The problem has been gradually worsening. The problem occurs constantly. The cough is non-productive. Associated symptoms include chest pain, nasal congestion and postnasal drip. Pertinent negatives include no chills, ear congestion, ear pain, fever, headaches, heartburn, hemoptysis, myalgias, rash, rhinorrhea, sore throat, shortness of breath, sweats, weight loss or wheezing. Nothing aggravates the symptoms. She has tried nothing for the symptoms. The treatment provided no relief.       Constitution: Negative for chills and fever.   HENT: Positive for congestion and postnasal drip. Negative for ear pain and sore throat.    Cardiovascular: Positive for chest pain. Negative for leg swelling, palpitations and sob on exertion.   Eyes: Negative for eye discharge, eye itching and eye pain.   Respiratory: Positive for chest tightness, cough and sputum production. Negative for bloody sputum, shortness of breath and wheezing.    Gastrointestinal: Negative for abdominal pain, nausea, vomiting, diarrhea and heartburn.   Genitourinary: Negative for dysuria.   Musculoskeletal: Negative for muscle ache.   Skin: Negative for rash.   Neurological: Negative for headaches.       Objective:      Physical Exam   Constitutional: She is oriented to " person, place, and time. She appears well-developed. She is cooperative.  Non-toxic appearance. She does not appear ill. No distress.   HENT:   Head: Normocephalic and atraumatic.   Ears:   Right Ear: Hearing, tympanic membrane, external ear and ear canal normal.   Left Ear: Hearing, tympanic membrane, external ear and ear canal normal.   Nose: Congestion present. No mucosal edema, rhinorrhea or nasal deformity. No epistaxis. Right sinus exhibits no maxillary sinus tenderness and no frontal sinus tenderness. Left sinus exhibits no maxillary sinus tenderness and no frontal sinus tenderness.   Mouth/Throat: Uvula is midline, oropharynx is clear and moist and mucous membranes are normal. No trismus in the jaw. Normal dentition. No uvula swelling. No oropharyngeal exudate, posterior oropharyngeal edema or posterior oropharyngeal erythema.   Eyes: Conjunctivae and lids are normal. No scleral icterus.   Neck: Trachea normal and phonation normal. Neck supple. No edema present. No erythema present. No neck rigidity present.   Cardiovascular: Normal rate, regular rhythm, normal heart sounds and normal pulses.   Pulmonary/Chest: Effort normal and breath sounds normal. No respiratory distress. She has no decreased breath sounds. She has no wheezes. She has no rhonchi. She has no rales.   Abdominal: Normal appearance. There is no abdominal tenderness.   Musculoskeletal: Normal range of motion.         General: No deformity. Normal range of motion.   Neurological: She is alert and oriented to person, place, and time. She exhibits normal muscle tone. Coordination normal.   Skin: Skin is warm, dry, intact, not diaphoretic and not pale.   Psychiatric: Her speech is normal and behavior is normal. Judgment and thought content normal.   Nursing note and vitals reviewed.      X-Ray Chest PA And Lateral    Result Date: 7/8/2022  EXAMINATION: XR CHEST PA AND LATERAL CLINICAL HISTORY: COVID-19 TECHNIQUE: PA and lateral views of the chest  were performed. COMPARISON: 07/29/2021. FINDINGS: The trachea is unremarkable.  There are calcifications of the aortic knob.  The cardiomediastinal silhouette is within normal limits.  The hemidiaphragms are unremarkable.  There are no pleural effusions.  There is no evidence of a pneumothorax.  There is no evidence of pneumomediastinum.  No airspace opacity is present.  The osseous structures are unremarkable.     No acute process. Electronically signed by: Jorge L Menard MD Date:    07/08/2022 Time:    17:50      Assessment:       1. Acute bronchitis due to COVID-19 virus          Plan:       VSS. CXR clear. Given cheratussin and albuterol to use prn for bronchitis.     Acute bronchitis due to COVID-19 virus  -     X-Ray Chest PA And Lateral; Future; Expected date: 07/08/2022  -     guaiFENesin-codeine 100-10 mg/5 ml (CHERATUSSIN AC)  mg/5 mL syrup; Take 10 mLs by mouth every 6 (six) hours as needed for Cough or Congestion.  Dispense: 230 mL; Refill: 0  -     albuterol (VENTOLIN HFA) 90 mcg/actuation inhaler; Inhale 2 puffs into the lungs every 6 (six) hours as needed for Wheezing. Rescue  Dispense: 18 g; Refill: 0     Anisha Bradshaw PA-C  [facility]         Patient Instructions   Take cheratussin cough syrup every 4-6 hours as needed for cough and congestion - contains mucinex. Albuterol inhaler 2-4 puffs every 4-6 hours as needed for wheeze, chest congestion or chest tightness. Rest and drink plenty of fluids.Tylenol or motrin for fever, sore throat or body aches. Flonase 1 spray/nostril daily will help with nasal congestion.     You need urgently re-evaluated for any new persistent fever, progressive chest pain or shortness of breath not relieved by inhaler, or cough not improving > 4 weeks.

## 2022-07-08 NOTE — TELEPHONE ENCOUNTER
Writer returned call to patient. Patient asking what OTC meds are ok to take? Writer will send patient the list.    Patient advised to see PCP right away to have lung sounds assessed, throat assessed etc. Patient verbalized understanding.        ----- Message from Jasen Castañeda sent at 7/8/2022  9:34 AM CDT -----  Regarding: speak to nurse  Patient is calling to speak to nurse in regards to her care, patient has concerns about her still experiencing her COVID symptoms. Wants to know if there is something she can take to help decrease or get rid of cough.    Contact: [phone]

## 2022-07-11 ENCOUNTER — PATIENT MESSAGE (OUTPATIENT)
Dept: PAIN MEDICINE | Facility: CLINIC | Age: 57
End: 2022-07-11
Payer: MEDICAID

## 2022-07-11 ENCOUNTER — TELEPHONE (OUTPATIENT)
Dept: PAIN MEDICINE | Facility: CLINIC | Age: 57
End: 2022-07-11
Payer: MEDICAID

## 2022-07-11 ENCOUNTER — TELEPHONE (OUTPATIENT)
Dept: URGENT CARE | Facility: CLINIC | Age: 57
End: 2022-07-11
Payer: MEDICAID

## 2022-07-11 NOTE — TELEPHONE ENCOUNTER
Patient calling because she had Covid and needs to reschedule her MRI and follow up with Emilee scheduled for 7/19.  I gave the patient the phone number for Radiology and asked her to call after she changes MRI appointment and I would change her follow up appointment with Emilee to review.

## 2022-07-11 NOTE — TELEPHONE ENCOUNTER
----- Message from Stefanie Boswell sent at 7/11/2022  2:59 PM CDT -----  Regarding: Advice  Contact: Patient  Patient would like a call back concerning her 07/19/22  appointment at  [phone] (home)

## 2022-07-20 ENCOUNTER — PATIENT MESSAGE (OUTPATIENT)
Dept: HEPATOLOGY | Facility: CLINIC | Age: 57
End: 2022-07-20
Payer: MEDICAID

## 2022-07-20 DIAGNOSIS — Z94.4 LIVER REPLACED BY TRANSPLANT: ICD-10-CM

## 2022-07-20 RX ORDER — TACROLIMUS 1 MG/1
CAPSULE ORAL
Qty: 90 CAPSULE | Refills: 11 | OUTPATIENT
Start: 2022-07-20

## 2022-07-25 NOTE — ASSESSMENT & PLAN NOTE
Anemia resolved following IV iron therapy.  Most recent CBC from 6/7/22 showed hemoglobin of 12.6 grams/deciliters with hematocrit of 41.1% and normal MCV.      Iron studies showed adequate iron saturation and ferritin level.  Noted normal B12, folate and thyroid levels.  No evidence of hemoglobinopathies.  Workup for plasma cell dyscrasia was unremarkable with no evidence of paraprotein.     Up-to-date with screening colonoscopy, next due in 2022.

## 2022-07-25 NOTE — ASSESSMENT & PLAN NOTE
Resolved. May consider BM biopsy in future if worsens.    CT scan from 02/28/2020 of the abdomen and pelvis did not reveal organomegaly.

## 2022-07-26 ENCOUNTER — LAB VISIT (OUTPATIENT)
Dept: LAB | Facility: HOSPITAL | Age: 57
End: 2022-07-26
Attending: INTERNAL MEDICINE
Payer: MEDICAID

## 2022-07-26 ENCOUNTER — OFFICE VISIT (OUTPATIENT)
Dept: HEMATOLOGY/ONCOLOGY | Facility: CLINIC | Age: 57
End: 2022-07-26
Payer: MEDICAID

## 2022-07-26 VITALS
HEART RATE: 91 BPM | OXYGEN SATURATION: 99 % | HEIGHT: 65 IN | BODY MASS INDEX: 35.33 KG/M2 | WEIGHT: 212.06 LBS | SYSTOLIC BLOOD PRESSURE: 134 MMHG | TEMPERATURE: 98 F | DIASTOLIC BLOOD PRESSURE: 88 MMHG

## 2022-07-26 DIAGNOSIS — Z94.4 LIVER REPLACED BY TRANSPLANT: ICD-10-CM

## 2022-07-26 DIAGNOSIS — D63.8 ANEMIA OF CHRONIC DISEASE: ICD-10-CM

## 2022-07-26 DIAGNOSIS — D69.6 THROMBOCYTOPENIA: ICD-10-CM

## 2022-07-26 DIAGNOSIS — K59.09 OTHER CONSTIPATION: ICD-10-CM

## 2022-07-26 DIAGNOSIS — R63.4 UNINTENTIONAL WEIGHT LOSS: ICD-10-CM

## 2022-07-26 LAB
ALBUMIN SERPL BCP-MCNC: 3.7 G/DL (ref 3.5–5.2)
ALP SERPL-CCNC: 198 U/L (ref 55–135)
ALT SERPL W/O P-5'-P-CCNC: 13 U/L (ref 10–44)
ANION GAP SERPL CALC-SCNC: 9 MMOL/L (ref 8–16)
AST SERPL-CCNC: 11 U/L (ref 10–40)
BASOPHILS # BLD AUTO: 0.03 K/UL (ref 0–0.2)
BASOPHILS NFR BLD: 0.3 % (ref 0–1.9)
BILIRUB SERPL-MCNC: 0.5 MG/DL (ref 0.1–1)
BUN SERPL-MCNC: 16 MG/DL (ref 6–20)
CALCIUM SERPL-MCNC: 9.3 MG/DL (ref 8.7–10.5)
CHLORIDE SERPL-SCNC: 105 MMOL/L (ref 95–110)
CO2 SERPL-SCNC: 26 MMOL/L (ref 23–29)
CREAT SERPL-MCNC: 0.9 MG/DL (ref 0.5–1.4)
DIFFERENTIAL METHOD: ABNORMAL
EOSINOPHIL # BLD AUTO: 0.3 K/UL (ref 0–0.5)
EOSINOPHIL NFR BLD: 2.6 % (ref 0–8)
ERYTHROCYTE [DISTWIDTH] IN BLOOD BY AUTOMATED COUNT: 13.9 % (ref 11.5–14.5)
EST. GFR  (AFRICAN AMERICAN): >60 ML/MIN/1.73 M^2
EST. GFR  (NON AFRICAN AMERICAN): >60 ML/MIN/1.73 M^2
GLUCOSE SERPL-MCNC: 97 MG/DL (ref 70–110)
HCT VFR BLD AUTO: 39.1 % (ref 37–48.5)
HGB BLD-MCNC: 12.3 G/DL (ref 12–16)
IMM GRANULOCYTES # BLD AUTO: 0.03 K/UL (ref 0–0.04)
IMM GRANULOCYTES NFR BLD AUTO: 0.3 % (ref 0–0.5)
LYMPHOCYTES # BLD AUTO: 2.2 K/UL (ref 1–4.8)
LYMPHOCYTES NFR BLD: 23 % (ref 18–48)
MCH RBC QN AUTO: 26.4 PG (ref 27–31)
MCHC RBC AUTO-ENTMCNC: 31.5 G/DL (ref 32–36)
MCV RBC AUTO: 84 FL (ref 82–98)
MONOCYTES # BLD AUTO: 0.5 K/UL (ref 0.3–1)
MONOCYTES NFR BLD: 5.5 % (ref 4–15)
NEUTROPHILS # BLD AUTO: 6.6 K/UL (ref 1.8–7.7)
NEUTROPHILS NFR BLD: 68.3 % (ref 38–73)
NRBC BLD-RTO: 0 /100 WBC
PLATELET # BLD AUTO: 249 K/UL (ref 150–450)
PMV BLD AUTO: 9.7 FL (ref 9.2–12.9)
POTASSIUM SERPL-SCNC: 3.8 MMOL/L (ref 3.5–5.1)
PROT SERPL-MCNC: 7.5 G/DL (ref 6–8.4)
RBC # BLD AUTO: 4.66 M/UL (ref 4–5.4)
SODIUM SERPL-SCNC: 140 MMOL/L (ref 136–145)
WBC # BLD AUTO: 9.69 K/UL (ref 3.9–12.7)

## 2022-07-26 PROCEDURE — 99999 PR PBB SHADOW E&M-EST. PATIENT-LVL III: CPT | Mod: PBBFAC,,, | Performed by: INTERNAL MEDICINE

## 2022-07-26 PROCEDURE — 80053 COMPREHEN METABOLIC PANEL: CPT | Performed by: INTERNAL MEDICINE

## 2022-07-26 PROCEDURE — 3075F SYST BP GE 130 - 139MM HG: CPT | Mod: CPTII,,, | Performed by: INTERNAL MEDICINE

## 2022-07-26 PROCEDURE — 3079F PR MOST RECENT DIASTOLIC BLOOD PRESSURE 80-89 MM HG: ICD-10-PCS | Mod: CPTII,,, | Performed by: INTERNAL MEDICINE

## 2022-07-26 PROCEDURE — 36415 COLL VENOUS BLD VENIPUNCTURE: CPT | Performed by: INTERNAL MEDICINE

## 2022-07-26 PROCEDURE — 99999 PR PBB SHADOW E&M-EST. PATIENT-LVL III: ICD-10-PCS | Mod: PBBFAC,,, | Performed by: INTERNAL MEDICINE

## 2022-07-26 PROCEDURE — 4010F ACE/ARB THERAPY RXD/TAKEN: CPT | Mod: CPTII,,, | Performed by: INTERNAL MEDICINE

## 2022-07-26 PROCEDURE — 1159F MED LIST DOCD IN RCRD: CPT | Mod: CPTII,,, | Performed by: INTERNAL MEDICINE

## 2022-07-26 PROCEDURE — 85025 COMPLETE CBC W/AUTO DIFF WBC: CPT | Performed by: INTERNAL MEDICINE

## 2022-07-26 PROCEDURE — 1159F PR MEDICATION LIST DOCUMENTED IN MEDICAL RECORD: ICD-10-PCS | Mod: CPTII,,, | Performed by: INTERNAL MEDICINE

## 2022-07-26 PROCEDURE — 99213 OFFICE O/P EST LOW 20 MIN: CPT | Mod: PBBFAC | Performed by: INTERNAL MEDICINE

## 2022-07-26 PROCEDURE — 3075F PR MOST RECENT SYSTOLIC BLOOD PRESS GE 130-139MM HG: ICD-10-PCS | Mod: CPTII,,, | Performed by: INTERNAL MEDICINE

## 2022-07-26 PROCEDURE — 3008F PR BODY MASS INDEX (BMI) DOCUMENTED: ICD-10-PCS | Mod: CPTII,,, | Performed by: INTERNAL MEDICINE

## 2022-07-26 PROCEDURE — 99214 OFFICE O/P EST MOD 30 MIN: CPT | Mod: S$PBB,,, | Performed by: INTERNAL MEDICINE

## 2022-07-26 PROCEDURE — 99214 PR OFFICE/OUTPT VISIT, EST, LEVL IV, 30-39 MIN: ICD-10-PCS | Mod: S$PBB,,, | Performed by: INTERNAL MEDICINE

## 2022-07-26 PROCEDURE — 4010F PR ACE/ARB THEARPY RXD/TAKEN: ICD-10-PCS | Mod: CPTII,,, | Performed by: INTERNAL MEDICINE

## 2022-07-26 PROCEDURE — 3079F DIAST BP 80-89 MM HG: CPT | Mod: CPTII,,, | Performed by: INTERNAL MEDICINE

## 2022-07-26 PROCEDURE — 3008F BODY MASS INDEX DOCD: CPT | Mod: CPTII,,, | Performed by: INTERNAL MEDICINE

## 2022-07-26 NOTE — PROGRESS NOTES
Subjective:   Date of Visit: 7/26/22   ?   ?    REFERRING PROVIDER: No referring provider defined for this encounter.   ?   CHIEF COMPLAINT: Anemia and thrombocytopenia???????       HPI:  56-year-old female with history of arthritis, alcohol abuse with end-stage liver disease, hypertension, hyperlipidemia, questionable liver cancer status post liver transplant in 2017, presents to us as a referral due to recent unintentional weight loss with associated abdominal pain and diarrhea as well as shortness of breath.    CT scan of the abdomen and pelvis showed atherosclerotic calcification of the abdominal aorta without aneurysmal dilatation, no acute abdominal abnormality.    PET-CT scan showed no suspicious foci of increased FDG avidity.  She was last seen in November, 2020.  Presents today for routine follow-up. C/o generalized malaise, nothing in particular.     Review of Systems   Constitutional: Positive for fatigue. Negative for activity change, appetite change, chills and fever.   HENT: Negative for hearing loss, mouth sores, nosebleeds, sore throat, tinnitus, trouble swallowing and voice change.    Eyes: Negative for visual disturbance.   Respiratory: Negative for cough and chest tightness.    Cardiovascular: Negative for chest pain and leg swelling.   Gastrointestinal: Negative for abdominal pain, anal bleeding, blood in stool, constipation, nausea and vomiting.   Genitourinary: Negative for dysuria, frequency, hematuria, pelvic pain, vaginal bleeding and vaginal pain.   Musculoskeletal: Negative for arthralgias, back pain, joint swelling and neck pain.   Skin: Negative for color change, pallor, rash and wound.   Allergic/Immunologic: Negative for immunocompromised state.   Neurological: Negative for dizziness, tremors, syncope, speech difficulty, light-headedness and headaches.   Hematological: Negative for adenopathy. Does not bruise/bleed easily.   Psychiatric/Behavioral: Negative for agitation, confusion,  decreased concentration, hallucinations and sleep disturbance. The patient is not nervous/anxious.        ?   PAST MEDICAL HISTORY:   Past Medical History:   Diagnosis Date    Alcohol abuse     Alcoholic hepatitis     Anemia of chronic disease     Arthritis     generialized    Cancer 12/26/2017    liver    Coagulopathy     Dyspnea on exertion 3/26/2020    Encounter for blood transfusion     End stage liver disease     History of alcohol abuse     Hyperlipidemia     Hypersomnia 9/11/2020    Hypertension     Hyponatremia     Liver cirrhosis     Liver transplant candidate 12/26/2017    Obesity (BMI 30-39.9) 1/5/2016    Sleep apnea     ?     PAST SURGICAL HISTORY:   Past Surgical History:   Procedure Laterality Date    BREAST BIOPSY Left     benign    BREAST LUMPECTOMY      patient is unsure of date or laterality    CHOLECYSTECTOMY      COLONOSCOPY N/A 12/1/2017    Procedure: COLONOSCOPY;  Surgeon: Filemon Fuentes MD;  Location: [facility] ([address] [place] [address]);  Service: Endoscopy;  Laterality: N/A;    COLONOSCOPY N/A 12/5/2017    Procedure: COLONOSCOPY;  Surgeon: José Chavira MD;  Location: [facility] ([address] [place] [address]);  Service: Endoscopy;  Laterality: N/A;    EPIDURAL STEROID INJECTION INTO CERVICAL SPINE N/A 1/4/2022    Procedure: C5/6 IL WILBERTO;  Surgeon: Ras Caballero MD;  Location: [facility];  Service: Pain Management;  Laterality: N/A;    EXAMINATION UNDER ANESTHESIA N/A 2/18/2020    Procedure: EXAM UNDER ANESTHESIA;  Surgeon: Alden Horowitz MD;  Location: [facility] OR;  Service: General;  Laterality: N/A;    EXCISIONAL HEMORRHOIDECTOMY N/A 2/18/2020    Procedure: HEMORRHOIDECTOMY;  Surgeon: Alden Horowitz MD;  Location: [facility] OR;  Service: General;  Laterality: N/A;    HYSTERECTOMY      complete     INJECTION OF ANESTHETIC AGENT AROUND PUDENDAL NERVE N/A 2/18/2020    Procedure: BLOCK, NERVE, PUDENDAL;  Surgeon: Alden Horowitz MD;  Location: [facility] OR;  Service: General;  Laterality: N/A;     INJECTION OF ANESTHETIC AGENT INTO SACROILIAC JOINT Right 6/14/2019    Procedure: right Sacroiliac Joint Injection;  Surgeon: David Desai MD;  Location: [facility];  Service: Pain Management;  Laterality: Right;    INJECTION OF ANESTHETIC AGENT INTO SACROILIAC JOINT Bilateral 2/7/2020    Procedure: Bilateral GT bursa + Bilateral Sacroiliac Joint Injection;  Surgeon: David Desai MD;  [facility];  Service: Pain Management;  Laterality: Bilateral;    INJECTION OF ANESTHETIC AGENT INTO SACROILIAC JOINT Bilateral 7/7/2020    Procedure: Bilateral GT bursa +SIJ injection;  Surgeon: David Desai MD;  Location: [facility];  Service: Pain Management;  Laterality: Bilateral;    INJECTION OF ANESTHETIC AGENT INTO SACROILIAC JOINT Right 10/21/2020    Procedure: Right piriformis + right SIJ + right GT bursa injection;  Surgeon: David Desai MD;  Location: [facility];  Service: Pain Management;  Laterality: Right;    INJECTION OF ANESTHETIC AGENT INTO SACROILIAC JOINT Bilateral 4/15/2021    Procedure: Bilateral SIJ + Bilateral Piriformis + Bilateral GT Bursa Injection;  Surgeon: Flaco Frazier MD;  Location: [facility];  Service: Pain Management;  Laterality: Bilateral;    INJECTION OF ANESTHETIC AGENT INTO SACROILIAC JOINT Bilateral 4/21/2022    Procedure: Bilateral SIJ + Bilateral Piriformis + Bilateral GT Bursa Injection;  Surgeon: Ras Caballero MD;  Location: [facility];  Service: Pain Management;  Laterality: Bilateral;    INJECTION OF JOINT Bilateral 2/7/2020    Procedure: Bilateral GT bursa + Bilateral Sacroiliac Joint Injection;  Surgeon: David Desai MD;  Location: [facility];  Service: Pain Management;  Laterality: Bilateral;    INJECTION OF JOINT Bilateral 7/7/2020    Procedure: Bilateral GT bursa +SIJ injection;  Surgeon: David Desai MD;  Location: [facility];  Service: Pain Management;  Laterality: Bilateral;    INJECTION OF JOINT Right 10/21/2020     Procedure: Right piriformis + right SIJ + right GT bursa injection;  Surgeon: David Desai MD;  Location: [facility];  Service: Pain Management;  Laterality: Right;    INJECTION OF JOINT Bilateral 2/8/2021    Procedure: Bilateral GT Bursa Injection;  Surgeon: Anna Kaminski MD;  Location: [facility];  Service: Pain Management;  Laterality: Bilateral;    INJECTION OF JOINT Bilateral 4/15/2021    Procedure: Bilateral SIJ + Bilateral Piriformis + Bilateral GT Bursa Injection;  Surgeon: Flaco Frazier MD;  Location: [facility];  Service: Pain Management;  Laterality: Bilateral;    INJECTION OF JOINT Bilateral 4/21/2022    Procedure: Bilateral SIJ + Bilateral Piriformis + Bilateral GT Bursa Injection;  Surgeon: Ras Caballero MD;  Location: [facility];  Service: Pain Management;  Laterality: Bilateral;    INJECTION OF PIRIFORMIS MUSCLE Right 6/14/2019    Procedure: Right piriformis injection;  Surgeon: David Desai MD;  Location: [facility];  Service: Pain Management;  Laterality: Right;    INJECTION OF PIRIFORMIS MUSCLE Right 10/21/2020    Procedure: Right piriformis + right SIJ + right GT bursa injection;  Surgeon: David Desai MD;  [facility];  Service: Pain Management;  Laterality: Right;    INJECTION OF PIRIFORMIS MUSCLE Bilateral 4/15/2021    Procedure: Bilateral SIJ + Bilateral Piriformis + Bilateral GT Bursa Injection;  Surgeon: Flaco Frazier MD;  Location: [facility];  Service: Pain Management;  Laterality: Bilateral;    INJECTION OF PIRIFORMIS MUSCLE Bilateral 4/21/2022    Procedure: Bilateral SIJ + Bilateral Piriformis + Bilateral GT Bursa Injection;  Surgeon: Ras Caballero MD;  Location: [facility];  Service: Pain Management;  Laterality: Bilateral;    LIVER TRANSPLANT  12/2017    SELECTIVE INJECTION OF ANESTHETIC AGENT AROUND LUMBAR SPINAL NERVE ROOT BY TRANSFORAMINAL APPROACH Bilateral 10/28/2021    Procedure: BLOCK, SPINAL NERVE ROOT,  LUMBAR, SELECTIVE, TRANSFORAMINAL APPROACH bilateral L4-5 and Right Knee injection with RN IV sedation;  Surgeon: Flaco Frazier MD;  Location: [facility];  Service: Pain Management;  Laterality: Bilateral;      ?   ALLERGIES:   Allergies as of 07/26/2022 - Reviewed 07/26/2022   Allergen Reaction Noted    Ferrous sulfate Other (See Comments) 01/10/2017      ?   MEDICATIONS:?   Outpatient Medications Marked as Taking for the 7/26/22 encounter (Office Visit) with Baldo Kim MD   Medication Sig Dispense Refill    albuterol (VENTOLIN HFA) 90 mcg/actuation inhaler Inhale 2 puffs into the lungs every 6 (six) hours as needed for Wheezing. Rescue 18 g 0    amitriptyline (ELAVIL) 25 MG tablet Take 1 tablet (25 mg total) by mouth nightly as needed for Insomnia or Pain. TAKE 1 TABLET(10 MG) BY MOUTH EVERY NIGHT AS NEEDED FOR INSOMNIA OR PAIN 30 tablet 2    atorvastatin (LIPITOR) 40 MG tablet Take 1 tablet (40 mg total) by mouth every evening. 90 tablet 2    cyclobenzaprine (FLEXERIL) 10 MG tablet TAKE 1/2 TO 1 TABLET BY MOUTH THREE TIMES DAILY AS NEEDED FOR MUSCLE SPASMS 90 tablet 5    ergocalciferol (ERGOCALCIFEROL) 50,000 unit Cap Take 1 capsule (50,000 Units total) by mouth every 7 days. 12 capsule 3    famotidine (PEPCID) 20 MG tablet Take 1 tablet (20 mg total) by mouth 2 (two) times a day. 90 tablet 3    furosemide (LASIX) 40 MG tablet TAKE 1 TABLET(40 MG) BY MOUTH EVERY DAY 30 tablet 5    gabapentin (NEURONTIN) 300 MG capsule Take 1 capsule (300 mg total) by mouth 3 (three) times daily. With 600mg cap to equal 900mg  capsule 5    gabapentin (NEURONTIN) 600 MG tablet Take 1 tablet (600 mg total) by mouth 3 (three) times daily. With 300mg cap to equal 900mg TID 90 tablet 5    losartan (COZAAR) 25 MG tablet Take 1 tablet (25 mg total) by mouth once daily. 90 tablet 3    magnesium oxide (MAG-OX) 400 mg (241.3 mg magnesium) tablet Take 1 tablet (400 mg total) by mouth 2 (two) times daily. 60  tablet 2    NIFEdipine (PROCARDIA-XL) 60 MG (OSM) 24 hr tablet TAKE 1 TABLET(60 MG) BY MOUTH EVERY EVENING 90 tablet 3    pantoprazole (PROTONIX) 40 MG tablet Take 1 tablet (40 mg total) by mouth once daily. 90 tablet 3    potassium chloride (KLOR-CON) 10 MEQ TbSR Take 1 tablet (10 mEq total) by mouth 2 (two) times daily. 180 tablet 2    pregabalin (LYRICA) 225 MG Cap Take 1 capsule (225 mg total) by mouth 2 (two) times daily. 60 capsule 1    tacrolimus (PROGRAF) 1 MG Cap TAKE 2 CAPSULES BY MOUTH EVERY MORNING AND 1 CAPSULE EVERY EVENING 90 capsule 11      ?   SOCIAL HISTORY:?   Social History     Tobacco Use    Smoking status: Never Smoker    Smokeless tobacco: Never Used   Substance Use Topics    Alcohol use: No     Comment: Currently admitted to inpatient rehab 7/2017        ?   FAMILY HISTORY:   family history includes Alzheimer's disease in her mother; Breast cancer in her paternal aunt; Depression in her maternal grandfather; Diabetes in her father and sister; Hypertension in her father and mother.   ?     Objective:      Physical Exam  Constitutional:       Appearance: She is well-developed. She is not toxic-appearing.   HENT:      Head: Normocephalic and atraumatic.      Mouth/Throat:      Pharynx: No oropharyngeal exudate.   Eyes:      General: No scleral icterus.        Right eye: No discharge.         Left eye: No discharge.      Conjunctiva/sclera: Conjunctivae normal.      Pupils: Pupils are equal, round, and reactive to light.   Neck:      Thyroid: No thyromegaly.   Cardiovascular:      Rate and Rhythm: Normal rate and regular rhythm.      Heart sounds: No murmur heard.  Pulmonary:      Effort: Pulmonary effort is normal. No respiratory distress.      Breath sounds: Normal breath sounds.   Chest:      Chest wall: No tenderness.   Breasts:      Right: No supraclavicular adenopathy.      Left: No supraclavicular adenopathy.       Abdominal:      General: Bowel sounds are normal. There is no  distension.      Palpations: Abdomen is soft. There is no mass.      Tenderness: There is no abdominal tenderness. There is no guarding or rebound.   Musculoskeletal:         General: No tenderness. Normal range of motion.      Cervical back: Normal range of motion and neck supple.   Lymphadenopathy:      Cervical: No cervical adenopathy.      Right cervical: No superficial cervical adenopathy.     Left cervical: No superficial cervical adenopathy.      Upper Body:      Right upper body: No supraclavicular or pectoral adenopathy.      Left upper body: No supraclavicular or pectoral adenopathy.      Lower Body: No right inguinal adenopathy. No left inguinal adenopathy.   Skin:     General: Skin is warm and dry.      Capillary Refill: Capillary refill takes 2 to 3 seconds.      Coloration: Skin is not pale.      Findings: No erythema or rash.   Neurological:      Mental Status: She is alert and oriented to person, place, and time.      Cranial Nerves: No cranial nerve deficit.      Sensory: No sensory deficit.   Psychiatric:         Behavior: Behavior normal. Behavior is cooperative.         Judgment: Judgment normal.         ?   Vitals:    07/26/22 0911   BP: 134/88   Pulse: 91   Temp: 97.9 °F (36.6 °C)      ?     ECOG SCORE    2 - Capable of all selfcare but unable to carry out any work activities, active > 50% of hours       ?   Laboratory:  ?   Lab Visit on 07/26/2022   Component Date Value Ref Range Status    WBC 07/26/2022 9.69  3.90 - 12.70 K/uL Final    RBC 07/26/2022 4.66  4.00 - 5.40 M/uL Final    Hemoglobin 07/26/2022 12.3  12.0 - 16.0 g/dL Final    Hematocrit 07/26/2022 39.1  37.0 - 48.5 % Final    MCV 07/26/2022 84  82 - 98 fL Final    MCH 07/26/2022 26.4 (A) 27.0 - 31.0 pg Final    MCHC 07/26/2022 31.5 (A) 32.0 - 36.0 g/dL Final    RDW 07/26/2022 13.9  11.5 - 14.5 % Final    Platelets 07/26/2022 249  150 - 450 K/uL Final    MPV 07/26/2022 9.7  9.2 - 12.9 fL Final    Immature Granulocytes  07/26/2022 0.3  0.0 - 0.5 % Final    Gran # (ANC) 07/26/2022 6.6  1.8 - 7.7 K/uL Final    Immature Grans (Abs) 07/26/2022 0.03  0.00 - 0.04 K/uL Final    Lymph # 07/26/2022 2.2  1.0 - 4.8 K/uL Final    Mono # 07/26/2022 0.5  0.3 - 1.0 K/uL Final    Eos # 07/26/2022 0.3  0.0 - 0.5 K/uL Final    Baso # 07/26/2022 0.03  0.00 - 0.20 K/uL Final    nRBC 07/26/2022 0  0 /100 WBC Final    Gran % 07/26/2022 68.3  38.0 - 73.0 % Final    Lymph % 07/26/2022 23.0  18.0 - 48.0 % Final    Mono % 07/26/2022 5.5  4.0 - 15.0 % Final    Eosinophil % 07/26/2022 2.6  0.0 - 8.0 % Final    Basophil % 07/26/2022 0.3  0.0 - 1.9 % Final    Differential Method 07/26/2022 Automated   Final    Sodium 07/26/2022 140  136 - 145 mmol/L Final    Potassium 07/26/2022 3.8  3.5 - 5.1 mmol/L Final    Chloride 07/26/2022 105  95 - 110 mmol/L Final    CO2 07/26/2022 26  23 - 29 mmol/L Final    Glucose 07/26/2022 97  70 - 110 mg/dL Final    BUN 07/26/2022 16  6 - 20 mg/dL Final    Creatinine 07/26/2022 0.9  0.5 - 1.4 mg/dL Final    Calcium 07/26/2022 9.3  8.7 - 10.5 mg/dL Final    Total Protein 07/26/2022 7.5  6.0 - 8.4 g/dL Final    Albumin 07/26/2022 3.7  3.5 - 5.2 g/dL Final    Total Bilirubin 07/26/2022 0.5  0.1 - 1.0 mg/dL Final    Alkaline Phosphatase 07/26/2022 198 (A) 55 - 135 U/L Final    AST 07/26/2022 11  10 - 40 U/L Final    ALT 07/26/2022 13  10 - 44 U/L Final    Anion Gap 07/26/2022 9  8 - 16 mmol/L Final    eGFR if African American 07/26/2022 >60  >60 mL/min/1.73 m^2 Final    eGFR if non African American 07/26/2022 >60  >60 mL/min/1.73 m^2 Final      ?   Tumor markers   ?   ?   Imaging: X-Ray Chest PA And Lateral  Narrative: EXAMINATION:  XR CHEST PA AND LATERAL    CLINICAL HISTORY:  COVID-19    TECHNIQUE:  PA and lateral views of the chest were performed.    COMPARISON:  07/29/2021.    FINDINGS:  The trachea is unremarkable.  There are calcifications of the aortic knob.  The cardiomediastinal silhouette is  within normal limits.  The hemidiaphragms are unremarkable.  There are no pleural effusions.  There is no evidence of a pneumothorax.  There is no evidence of pneumomediastinum.  No airspace opacity is present.  The osseous structures are unremarkable.  Impression: No acute process.    Electronically signed by: Jorge L Menard MD  Date:    07/08/2022  Time:    17:50     ?      Pathology:  Pathology Results  (Last 10 years)               02/18/20 0828  Specimen to Pathology, Surgery General Surgery Final result    Narrative:  Pre-op Diagnosis: Grade IV hemorrhoids [K64.3]   Rectal bleed [K62.5]   Procedure(s):   HEMORRHOIDECTOMY   BLOCK, NERVE, PUDENDAL   Number of specimens: 1   Name of specimens:   1. Right posterior hemorrhoid lesion - PERM   Specimen total (fresh, frozen, permanent):->1              ?   Assessment/Plan:       1. Liver replaced by transplant    2. Thrombocytopenia    3. Anemia of chronic disease    4. Other constipation          ?Liver replaced by transplant  On immunosuppressants.  Followed by transplant team.    Thrombocytopenia  Resolved. May consider BM biopsy in future if worsens.    CT scan from 02/28/2020 of the abdomen and pelvis did not reveal organomegaly.    Anemia of chronic disease  Anemia resolved following IV iron therapy.  Most recent CBC from 6/7/22 showed hemoglobin of 12.6 grams/deciliters with hematocrit of 41.1% and normal MCV.      Iron studies showed adequate iron saturation and ferritin level.  Noted normal B12, folate and thyroid levels.  No evidence of hemoglobinopathies.  Workup for plasma cell dyscrasia was unremarkable with no evidence of paraprotein.     Up-to-date with screening colonoscopy, next due in 2022.     Other constipation  Improving with laxatives per patient. Due for screening colonoscopy, order placed          ?Liver replaced by transplant  -     CBC Auto Differential; Future; Expected date: 07/26/2022  -     Comprehensive Metabolic Panel; Future; Expected  date: 07/26/2022  -     Leukemia/Lymphoma Screen - Bone Marrow Right Posterior Iliac Crest; Future; Expected date: 07/26/2022  -     CT Biopsy Bone Marrow (xpd); Future; Expected date: 07/26/2022  -     Specimen to Pathology, Bone Marrow Aspiration/Biopsy  -     Heme Disorders DNA/RNA Hold, Bone Marrow    Thrombocytopenia  -     CBC Auto Differential; Future; Expected date: 07/26/2022  -     Comprehensive Metabolic Panel; Future; Expected date: 07/26/2022  -     Leukemia/Lymphoma Screen - Bone Marrow Right Posterior Iliac Crest; Future; Expected date: 07/26/2022  -     CT Biopsy Bone Marrow (xpd); Future; Expected date: 07/26/2022  -     Specimen to Pathology, Bone Marrow Aspiration/Biopsy  -     Heme Disorders DNA/RNA Hold, Bone Marrow    Anemia of chronic disease  -     CBC Auto Differential; Future; Expected date: 07/26/2022  -     Comprehensive Metabolic Panel; Future; Expected date: 07/26/2022  -     Leukemia/Lymphoma Screen - Bone Marrow Right Posterior Iliac Crest; Future; Expected date: 07/26/2022  -     CT Biopsy Bone Marrow (xpd); Future; Expected date: 07/26/2022  -     Specimen to Pathology, Bone Marrow Aspiration/Biopsy  -     Heme Disorders DNA/RNA Hold, Bone Marrow  -     Case Request Endoscopy: COLONOSCOPY    Other constipation  -     Case Request Endoscopy: COLONOSCOPY      ?   Follow-Up: Follow up in about 2 months (around 9/26/2022).    ALEX GUADALUPE Md., Ph.D  Hematology & Oncology Department  Phone #: [phone]

## 2022-07-28 ENCOUNTER — PATIENT MESSAGE (OUTPATIENT)
Dept: HEMATOLOGY/ONCOLOGY | Facility: CLINIC | Age: 57
End: 2022-07-28
Payer: MEDICAID

## 2022-07-30 ENCOUNTER — HOSPITAL ENCOUNTER (OUTPATIENT)
Dept: RADIOLOGY | Facility: HOSPITAL | Age: 57
Discharge: HOME | End: 2022-07-30
Attending: PHYSICIAN ASSISTANT
Payer: MEDICAID

## 2022-07-30 DIAGNOSIS — M54.16 BILATERAL LUMBAR RADICULOPATHY: ICD-10-CM

## 2022-07-30 PROCEDURE — 72148 MRI LUMBAR SPINE W/O DYE: CPT | Mod: 26,,, | Performed by: RADIOLOGY

## 2022-07-30 PROCEDURE — 72148 MRI LUMBAR SPINE W/O DYE: CPT | Mod: TC

## 2022-07-30 PROCEDURE — 72148 MRI LUMBAR SPINE WITHOUT CONTRAST: ICD-10-PCS | Mod: 26,,, | Performed by: RADIOLOGY

## 2022-08-03 ENCOUNTER — OFFICE VISIT (OUTPATIENT)
Dept: PAIN MEDICINE | Facility: CLINIC | Age: 57
End: 2022-08-03
Payer: MEDICAID

## 2022-08-03 VITALS
WEIGHT: 212 LBS | HEIGHT: 65 IN | DIASTOLIC BLOOD PRESSURE: 76 MMHG | BODY MASS INDEX: 35.32 KG/M2 | HEART RATE: 64 BPM | SYSTOLIC BLOOD PRESSURE: 132 MMHG | RESPIRATION RATE: 17 BRPM

## 2022-08-03 DIAGNOSIS — M54.16 BILATERAL LUMBAR RADICULOPATHY: Primary | ICD-10-CM

## 2022-08-03 DIAGNOSIS — G89.29 INSOMNIA SECONDARY TO CHRONIC PAIN: ICD-10-CM

## 2022-08-03 DIAGNOSIS — G47.01 INSOMNIA SECONDARY TO CHRONIC PAIN: ICD-10-CM

## 2022-08-03 DIAGNOSIS — M50.30 DDD (DEGENERATIVE DISC DISEASE), CERVICAL: ICD-10-CM

## 2022-08-03 DIAGNOSIS — M54.2 DISCOGENIC CERVICAL PAIN: ICD-10-CM

## 2022-08-03 PROCEDURE — 3078F PR MOST RECENT DIASTOLIC BLOOD PRESSURE < 80 MM HG: ICD-10-PCS | Mod: CPTII,,, | Performed by: PHYSICIAN ASSISTANT

## 2022-08-03 PROCEDURE — 99999 PR PBB SHADOW E&M-EST. PATIENT-LVL IV: ICD-10-PCS | Mod: PBBFAC,,, | Performed by: PHYSICIAN ASSISTANT

## 2022-08-03 PROCEDURE — 3008F PR BODY MASS INDEX (BMI) DOCUMENTED: ICD-10-PCS | Mod: CPTII,,, | Performed by: PHYSICIAN ASSISTANT

## 2022-08-03 PROCEDURE — 4010F ACE/ARB THERAPY RXD/TAKEN: CPT | Mod: CPTII,,, | Performed by: PHYSICIAN ASSISTANT

## 2022-08-03 PROCEDURE — 1159F PR MEDICATION LIST DOCUMENTED IN MEDICAL RECORD: ICD-10-PCS | Mod: CPTII,,, | Performed by: PHYSICIAN ASSISTANT

## 2022-08-03 PROCEDURE — 1159F MED LIST DOCD IN RCRD: CPT | Mod: CPTII,,, | Performed by: PHYSICIAN ASSISTANT

## 2022-08-03 PROCEDURE — 99214 OFFICE O/P EST MOD 30 MIN: CPT | Mod: PBBFAC | Performed by: PHYSICIAN ASSISTANT

## 2022-08-03 PROCEDURE — 99214 PR OFFICE/OUTPT VISIT, EST, LEVL IV, 30-39 MIN: ICD-10-PCS | Mod: S$PBB,,, | Performed by: PHYSICIAN ASSISTANT

## 2022-08-03 PROCEDURE — 3008F BODY MASS INDEX DOCD: CPT | Mod: CPTII,,, | Performed by: PHYSICIAN ASSISTANT

## 2022-08-03 PROCEDURE — 3075F PR MOST RECENT SYSTOLIC BLOOD PRESS GE 130-139MM HG: ICD-10-PCS | Mod: CPTII,,, | Performed by: PHYSICIAN ASSISTANT

## 2022-08-03 PROCEDURE — 4010F PR ACE/ARB THEARPY RXD/TAKEN: ICD-10-PCS | Mod: CPTII,,, | Performed by: PHYSICIAN ASSISTANT

## 2022-08-03 PROCEDURE — 99214 OFFICE O/P EST MOD 30 MIN: CPT | Mod: S$PBB,,, | Performed by: PHYSICIAN ASSISTANT

## 2022-08-03 PROCEDURE — 99999 PR PBB SHADOW E&M-EST. PATIENT-LVL IV: CPT | Mod: PBBFAC,,, | Performed by: PHYSICIAN ASSISTANT

## 2022-08-03 PROCEDURE — 3075F SYST BP GE 130 - 139MM HG: CPT | Mod: CPTII,,, | Performed by: PHYSICIAN ASSISTANT

## 2022-08-03 PROCEDURE — 1160F PR REVIEW ALL MEDS BY PRESCRIBER/CLIN PHARMACIST DOCUMENTED: ICD-10-PCS | Mod: CPTII,,, | Performed by: PHYSICIAN ASSISTANT

## 2022-08-03 PROCEDURE — 3078F DIAST BP <80 MM HG: CPT | Mod: CPTII,,, | Performed by: PHYSICIAN ASSISTANT

## 2022-08-03 PROCEDURE — 1160F RVW MEDS BY RX/DR IN RCRD: CPT | Mod: CPTII,,, | Performed by: PHYSICIAN ASSISTANT

## 2022-08-03 RX ORDER — AMITRIPTYLINE HYDROCHLORIDE 25 MG/1
25-50 TABLET, FILM COATED ORAL NIGHTLY PRN
Qty: 60 TABLET | Refills: 1 | Status: SHIPPED | OUTPATIENT
Start: 2022-08-03 | End: 2022-09-21 | Stop reason: SDUPTHER
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
## 2022-08-10 NOTE — PRE-PROCEDURE INSTRUCTIONS
Spoke with patient regarding procedure scheduled on 8.18    Arrival time 0720    Has patient been sick with fever or on antibiotics within the last 7 days? No    Does the patient have any open wounds, sores or rashes? No    Does the patient have any recent fractures? no    Has patient received a vaccination within the last 7 days? No    Received the COVID vaccination? yes    Has the patient stopped all medications as directed? NA    Does patient have a pacemaker and or defibrillator? no    Does the patient have a ride to and from procedure and someone reliable to remain with patient? Son     Is the patient diabetic? no    Does the patient have sleep apnea? Or use O2 at home? tien on cpap    Is the patient receiving sedation? yes    Is the patient instructed to remain NPO beginning at midnight the night before their procedure? yes    Procedure location confirmed with patient? Yes    Covid- Denies signs/symptoms. Instructed to notify PAT/MD if any changes.

## 2022-08-17 ENCOUNTER — PATIENT MESSAGE (OUTPATIENT)
Dept: HEPATOLOGY | Facility: CLINIC | Age: 57
End: 2022-08-17
Payer: MEDICAID

## 2022-08-17 ENCOUNTER — PATIENT MESSAGE (OUTPATIENT)
Dept: INTERNAL MEDICINE | Facility: CLINIC | Age: 57
End: 2022-08-17
Payer: MEDICAID

## 2022-08-17 DIAGNOSIS — E83.42 HYPOMAGNESEMIA: ICD-10-CM

## 2022-08-17 RX ORDER — LANOLIN ALCOHOL/MO/W.PET/CERES
400 CREAM (GRAM) TOPICAL 2 TIMES DAILY
Qty: 60 TABLET | Refills: 2 | Status: SHIPPED | OUTPATIENT
Start: 2022-08-17 | End: 2022-11-23 | Stop reason: SDUPTHER

## 2022-08-18 ENCOUNTER — HOSPITAL ENCOUNTER (OUTPATIENT)
Facility: HOSPITAL | Age: 57
Discharge: HOME | End: 2022-08-18
Attending: PHYSICAL MEDICINE & REHABILITATION | Admitting: PHYSICAL MEDICINE & REHABILITATION
Payer: MEDICAID

## 2022-08-18 VITALS
HEIGHT: 65 IN | OXYGEN SATURATION: 97 % | BODY MASS INDEX: 35.63 KG/M2 | HEART RATE: 75 BPM | RESPIRATION RATE: 16 BRPM | WEIGHT: 213.88 LBS | SYSTOLIC BLOOD PRESSURE: 124 MMHG | DIASTOLIC BLOOD PRESSURE: 60 MMHG | TEMPERATURE: 98 F

## 2022-08-18 DIAGNOSIS — M54.16 LUMBAR RADICULOPATHY: Primary | ICD-10-CM

## 2022-08-18 PROCEDURE — 64483 PR EPIDURAL INJ, ANES/STEROID, TRANSFORAMINAL, LUMB/SACR, SNGL LEVL: ICD-10-PCS | Mod: 50,,, | Performed by: PHYSICAL MEDICINE & REHABILITATION

## 2022-08-18 PROCEDURE — 64483 NJX AA&/STRD TFRM EPI L/S 1: CPT | Mod: 50 | Performed by: PHYSICAL MEDICINE & REHABILITATION

## 2022-08-18 PROCEDURE — 63600175 PHARM REV CODE 636 W HCPCS: Performed by: PHYSICAL MEDICINE & REHABILITATION

## 2022-08-18 PROCEDURE — 25500020 PHARM REV CODE 255: Performed by: PHYSICAL MEDICINE & REHABILITATION

## 2022-08-18 PROCEDURE — 64483 NJX AA&/STRD TFRM EPI L/S 1: CPT | Mod: 50,,, | Performed by: PHYSICAL MEDICINE & REHABILITATION

## 2022-08-18 PROCEDURE — 25000003 PHARM REV CODE 250: Performed by: PHYSICAL MEDICINE & REHABILITATION

## 2022-08-18 RX ORDER — MIDAZOLAM HYDROCHLORIDE 1 MG/ML
INJECTION, SOLUTION INTRAMUSCULAR; INTRAVENOUS
Status: DISCONTINUED | OUTPATIENT
Start: 2022-08-18 | End: 2022-08-18 | Stop reason: HOSPADM

## 2022-08-18 RX ORDER — METHYLPREDNISOLONE ACETATE 40 MG/ML
INJECTION, SUSPENSION INTRA-ARTICULAR; INTRALESIONAL; INTRAMUSCULAR; SOFT TISSUE
Status: DISCONTINUED | OUTPATIENT
Start: 2022-08-18 | End: 2022-08-18 | Stop reason: HOSPADM

## 2022-08-18 RX ORDER — FENTANYL CITRATE 50 UG/ML
INJECTION, SOLUTION INTRAMUSCULAR; INTRAVENOUS
Status: DISCONTINUED | OUTPATIENT
Start: 2022-08-18 | End: 2022-08-18 | Stop reason: HOSPADM

## 2022-08-18 RX ORDER — BUPIVACAINE HYDROCHLORIDE 2.5 MG/ML
INJECTION, SOLUTION EPIDURAL; INFILTRATION; INTRACAUDAL
Status: DISCONTINUED | OUTPATIENT
Start: 2022-08-18 | End: 2022-08-18 | Stop reason: HOSPADM

## 2022-08-18 NOTE — DISCHARGE SUMMARY
Discharge Note  Short Stay      SUMMARY     Admit Date: 8/18/2022    Attending Physician: Ras Caballero MD        Discharge Physician: Ras Caballero MD        Discharge Date: 8/18/2022 8:47 AM    Procedure(s) (LRB):  Bilateral L4/5 TF WILBERTO (Bilateral)    Final Diagnosis: Bilateral lumbar radiculopathy [M54.16]    Disposition: Home or self care    Patient Instructions:   Current Discharge Medication List      CONTINUE these medications which have NOT CHANGED    Details   amitriptyline (ELAVIL) 25 MG tablet Take 1-2 tablets (25-50 mg total) by mouth nightly as needed for Insomnia or Pain.  Qty: 60 tablet, Refills: 1    Associated Diagnoses: Insomnia secondary to chronic pain      atorvastatin (LIPITOR) 40 MG tablet Take 1 tablet (40 mg total) by mouth every evening.  Qty: 90 tablet, Refills: 2    Associated Diagnoses: Mixed hyperlipidemia      cyclobenzaprine (FLEXERIL) 10 MG tablet TAKE 1/2 TO 1 TABLET BY MOUTH THREE TIMES DAILY AS NEEDED FOR MUSCLE SPASMS  Qty: 90 tablet, Refills: 5    Associated Diagnoses: Piriformis muscle pain      famotidine (PEPCID) 20 MG tablet Take 1 tablet (20 mg total) by mouth 2 (two) times a day.  Qty: 90 tablet, Refills: 3    Comments: -  Associated Diagnoses: Gastroesophageal reflux disease with esophagitis without hemorrhage      furosemide (LASIX) 40 MG tablet TAKE 1 TABLET(40 MG) BY MOUTH EVERY DAY  Qty: 30 tablet, Refills: 5    Associated Diagnoses: Prophylactic immunotherapy; Bilateral leg edema      losartan (COZAAR) 25 MG tablet Take 1 tablet (25 mg total) by mouth once daily.  Qty: 90 tablet, Refills: 3    Comments: -  Associated Diagnoses: Essential hypertension      magnesium oxide (MAG-OX) 400 mg (241.3 mg magnesium) tablet Take 1 tablet (400 mg total) by mouth 2 (two) times daily.  Qty: 60 tablet, Refills: 2    Associated Diagnoses: Hypomagnesemia      NIFEdipine (PROCARDIA-XL) 60 MG (OSM) 24 hr tablet TAKE 1 TABLET(60 MG) BY MOUTH EVERY EVENING  Qty: 90 tablet,  Refills: 3    Comments: -  Associated Diagnoses: Essential hypertension      pantoprazole (PROTONIX) 40 MG tablet Take 1 tablet (40 mg total) by mouth once daily.  Qty: 90 tablet, Refills: 3    Comments: -  Associated Diagnoses: Gastroesophageal reflux disease with esophagitis without hemorrhage      potassium chloride (KLOR-CON) 10 MEQ TbSR Take 1 tablet (10 mEq total) by mouth 2 (two) times daily.  Qty: 180 tablet, Refills: 2      pregabalin (LYRICA) 225 MG Cap Take 1 capsule (225 mg total) by mouth 2 (two) times daily.  Qty: 60 capsule, Refills: 1      tacrolimus (PROGRAF) 1 MG Cap TAKE 2 CAPSULES BY MOUTH EVERY MORNING AND 1 CAPSULE EVERY EVENING  Qty: 90 capsule, Refills: 11    Associated Diagnoses: Liver replaced by transplant      albuterol (VENTOLIN HFA) 90 mcg/actuation inhaler Inhale 2 puffs into the lungs every 6 (six) hours as needed for Wheezing. Rescue  Qty: 18 g, Refills: 0    Associated Diagnoses: Acute bronchitis due to COVID-19 virus      ergocalciferol (ERGOCALCIFEROL) 50,000 unit Cap Take 1 capsule (50,000 Units total) by mouth every 7 days.  Qty: 12 capsule, Refills: 3    Comments: -  Associated Diagnoses: Vitamin D deficiency      gabapentin (NEURONTIN) 300 MG capsule Take 1 capsule (300 mg total) by mouth 3 (three) times daily. With 600mg cap to equal 900mg TID  Qty: 120 capsule, Refills: 5    Associated Diagnoses: Bilateral lumbar radiculopathy; Neuropathy      gabapentin (NEURONTIN) 600 MG tablet Take 1 tablet (600 mg total) by mouth 3 (three) times daily. With 300mg cap to equal 900mg TID  Qty: 90 tablet, Refills: 5    Associated Diagnoses: Bilateral lumbar radiculopathy                 Discharge Diagnosis: Bilateral lumbar radiculopathy [M54.16]  Condition on Discharge: Stable with no complications to procedure   Diet on Discharge: Same as before.  Activity: as per instruction sheet.  Discharge to: Home with a responsible adult.  Follow up: 2-4 weeks       Please call the office at [phone] if you experience any weakness or loss of sensation, fever > 101.5, pain uncontrolled with oral medications, persistent nausea/vomiting/or diarrhea, redness or drainage from the incisions, or any other worrisome concerns. If physician on call was not reached or could not communicate with our office for any reason please go to the nearest emergency department

## 2022-08-18 NOTE — H&P
HPI  Patient presenting for Procedure(s) (LRB):  Bilateral L4/5 TF WILBERTO (Bilateral)     Patient on Anti-coagulation No    No health changes since previous encounter    Past Medical History:   Diagnosis Date    Alcohol abuse     Alcoholic hepatitis     Anemia of chronic disease     Arthritis     generialized    Cancer 12/26/2017    liver    Coagulopathy     Dyspnea on exertion 3/26/2020    Encounter for blood transfusion     End stage liver disease     History of alcohol abuse     Hyperlipidemia     Hypersomnia 9/11/2020    Hypertension     Hyponatremia     Liver cirrhosis     Liver transplant candidate 12/26/2017    Obesity (BMI 30-39.9) 1/5/2016    Sleep apnea      Past Surgical History:   Procedure Laterality Date    BREAST BIOPSY Left     benign    BREAST LUMPECTOMY      patient is unsure of date or laterality    CHOLECYSTECTOMY      COLONOSCOPY N/A 12/1/2017    Procedure: COLONOSCOPY;  Surgeon: Filemon Fuentes MD;  Location: [facility] ([address] [place] [address]);  Service: Endoscopy;  Laterality: N/A;    COLONOSCOPY N/A 12/5/2017    Procedure: COLONOSCOPY;  Surgeon: José Chavira MD;  Location: [facility] ([address] [place] [address]);  Service: Endoscopy;  Laterality: N/A;    EPIDURAL STEROID INJECTION INTO CERVICAL SPINE N/A 1/4/2022    Procedure: C5/6 IL WILBERTO;  Surgeon: Ras Caballero MD;  Location: [facility]IN [facility]GT;  Service: Pain Management;  Laterality: N/A;    EXAMINATION UNDER ANESTHESIA N/A 2/18/2020    Procedure: EXAM UNDER ANESTHESIA;  Surgeon: Alden Horowitz MD;  Location: [facility] OR;  Service: General;  Laterality: N/A;    EXCISIONAL HEMORRHOIDECTOMY N/A 2/18/2020    Procedure: HEMORRHOIDECTOMY;  Surgeon: Alden Horowitz MD;  Location: [facility] OR;  Service: General;  Laterality: N/A;    HYSTERECTOMY      complete     INJECTION OF ANESTHETIC AGENT AROUND PUDENDAL NERVE N/A 2/18/2020    Procedure: BLOCK, NERVE, PUDENDAL;  Surgeon: Alden Horowitz MD;  Location: [facility] OR;  Service: General;  Laterality: N/A;     INJECTION OF ANESTHETIC AGENT INTO SACROILIAC JOINT Right 6/14/2019    Procedure: right Sacroiliac Joint Injection;  Surgeon: David Desai MD;  Location: [facility] PAIN [facility]GT;  Service: Pain Management;  Laterality: Right;    INJECTION OF ANESTHETIC AGENT INTO SACROILIAC JOINT Bilateral 2/7/2020    Procedure: Bilateral GT bursa + Bilateral Sacroiliac Joint Injection;  Surgeon: David Desai MD;  Location: [facility]IN [facility]GT;  Service: Pain Management;  Laterality: Bilateral;    INJECTION OF ANESTHETIC AGENT INTO SACROILIAC JOINT Bilateral 7/7/2020    Procedure: Bilateral GT bursa +SIJ injection;  Surgeon: David Desai MD;  Location: [facility]GT;  Service: Pain Management;  Laterality: Bilateral;    INJECTION OF ANESTHETIC AGENT INTO SACROILIAC JOINT Right 10/21/2020    Procedure: Right piriformis + right SIJ + right GT bursa injection;  Surgeon: David Desai MD;  Location: [facility]GT;  Service: Pain Management;  Laterality: Right;    INJECTION OF ANESTHETIC AGENT INTO SACROILIAC JOINT Bilateral 4/15/2021    Procedure: Bilateral SIJ + Bilateral Piriformis + Bilateral GT Bursa Injection;  Surgeon: Flaco Frazier MD;  Location: [facility];  Service: Pain Management;  Laterality: Bilateral;    INJECTION OF ANESTHETIC AGENT INTO SACROILIAC JOINT Bilateral 4/21/2022    Procedure: Bilateral SIJ + Bilateral Piriformis + Bilateral GT Bursa Injection;  Surgeon: Ras Caballero MD;  Location: [facility];  Service: Pain Management;  Laterality: Bilateral;    INJECTION OF JOINT Bilateral 2/7/2020    Procedure: Bilateral GT bursa + Bilateral Sacroiliac Joint Injection;  Surgeon: David Desai MD;  Location: [facility];  Service: Pain Management;  Laterality: Bilateral;    INJECTION OF JOINT Bilateral 7/7/2020    Procedure: Bilateral GT bursa +SIJ injection;  Surgeon: David Desai MD;  Location: [facility];  Service: Pain Management;  Laterality: Bilateral;    INJECTION OF JOINT Right  10/21/2020    Procedure: Right piriformis + right SIJ + right GT bursa injection;  Surgeon: David Desai MD;  Location: [facility];  Service: Pain Management;  Laterality: Right;    INJECTION OF JOINT Bilateral 2/8/2021    Procedure: Bilateral GT Bursa Injection;  Surgeon: Anna Kaminski MD;  Location: [facility];  Service: Pain Management;  Laterality: Bilateral;    INJECTION OF JOINT Bilateral 4/15/2021    Procedure: Bilateral SIJ + Bilateral Piriformis + Bilateral GT Bursa Injection;  Surgeon: Flaco Frazier MD;  Location: [facility];  Service: Pain Management;  Laterality: Bilateral;    INJECTION OF JOINT Bilateral 4/21/2022    Procedure: Bilateral SIJ + Bilateral Piriformis + Bilateral GT Bursa Injection;  Surgeon: Ras Caballero MD;  Location: [facility];  Service: Pain Management;  Laterality: Bilateral;    INJECTION OF PIRIFORMIS MUSCLE Right 6/14/2019    Procedure: Right piriformis injection;  Surgeon: David Desai MD;  Location: [facility];  Service: Pain Management;  Laterality: Right;    INJECTION OF PIRIFORMIS MUSCLE Right 10/21/2020    Procedure: Right piriformis + right SIJ + right GT bursa injection;  Surgeon: David Desai MD;  [facility];  Service: Pain Management;  Laterality: Right;    INJECTION OF PIRIFORMIS MUSCLE Bilateral 4/15/2021    Procedure: Bilateral SIJ + Bilateral Piriformis + Bilateral GT Bursa Injection;  Surgeon: Flaco Frazier MD;  Location: [facility];  Service: Pain Management;  Laterality: Bilateral;    INJECTION OF PIRIFORMIS MUSCLE Bilateral 4/21/2022    Procedure: Bilateral SIJ + Bilateral Piriformis + Bilateral GT Bursa Injection;  Surgeon: Ras Caballero MD;  Location: [facility];  Service: Pain Management;  Laterality: Bilateral;    LIVER TRANSPLANT  12/2017    SELECTIVE INJECTION OF ANESTHETIC AGENT AROUND LUMBAR SPINAL NERVE ROOT BY TRANSFORAMINAL APPROACH Bilateral 10/28/2021    Procedure: BLOCK, SPINAL NERVE  ROOT, LUMBAR, SELECTIVE, TRANSFORAMINAL APPROACH bilateral L4-5 and Right Knee injection with RN IV sedation;  Surgeon: Flaco Frazier MD;  Location: [facility];  Service: Pain Management;  Laterality: Bilateral;     Review of patient's allergies indicates:   Allergen Reactions    Ferrous sulfate Other (See Comments)     Patient states the pill makes her sick. She stated she would rather have a shot        Current Facility-Administered Medications on File Prior to Encounter   Medication Dose Route Frequency Provider Last Rate Last Admin    ondansetron injection 4 mg  4 mg Intravenous Once PRN Ras Caballero MD         Current Outpatient Medications on File Prior to Encounter   Medication Sig Dispense Refill    amitriptyline (ELAVIL) 25 MG tablet Take 1-2 tablets (25-50 mg total) by mouth nightly as needed for Insomnia or Pain. 60 tablet 1    atorvastatin (LIPITOR) 40 MG tablet Take 1 tablet (40 mg total) by mouth every evening. 90 tablet 2    cyclobenzaprine (FLEXERIL) 10 MG tablet TAKE 1/2 TO 1 TABLET BY MOUTH THREE TIMES DAILY AS NEEDED FOR MUSCLE SPASMS 90 tablet 5    famotidine (PEPCID) 20 MG tablet Take 1 tablet (20 mg total) by mouth 2 (two) times a day. 90 tablet 3    furosemide (LASIX) 40 MG tablet TAKE 1 TABLET(40 MG) BY MOUTH EVERY DAY 30 tablet 5    losartan (COZAAR) 25 MG tablet Take 1 tablet (25 mg total) by mouth once daily. 90 tablet 3    NIFEdipine (PROCARDIA-XL) 60 MG (OSM) 24 hr tablet TAKE 1 TABLET(60 MG) BY MOUTH EVERY EVENING 90 tablet 3    pantoprazole (PROTONIX) 40 MG tablet Take 1 tablet (40 mg total) by mouth once daily. 90 tablet 3    potassium chloride (KLOR-CON) 10 MEQ TbSR Take 1 tablet (10 mEq total) by mouth 2 (two) times daily. 180 tablet 2    pregabalin (LYRICA) 225 MG Cap Take 1 capsule (225 mg total) by mouth 2 (two) times daily. 60 capsule 1    tacrolimus (PROGRAF) 1 MG Cap TAKE 2 CAPSULES BY MOUTH EVERY MORNING AND 1 CAPSULE EVERY EVENING 90 capsule 11     "albuterol (VENTOLIN HFA) 90 mcg/actuation inhaler Inhale 2 puffs into the lungs every 6 (six) hours as needed for Wheezing. Rescue 18 g 0    ergocalciferol (ERGOCALCIFEROL) 50,000 unit Cap Take 1 capsule (50,000 Units total) by mouth every 7 days. 12 capsule 3    gabapentin (NEURONTIN) 300 MG capsule Take 1 capsule (300 mg total) by mouth 3 (three) times daily. With 600mg cap to equal 900mg  capsule 5    gabapentin (NEURONTIN) 600 MG tablet Take 1 tablet (600 mg total) by mouth 3 (three) times daily. With 300mg cap to equal 900mg TID 90 tablet 5        PMHx, PSHx, Allergies, Medications reviewed in epic    ROS negative except pain complaints in HPI    OBJECTIVE:    /69 (BP Location: Right arm, Patient Position: Sitting)   Pulse 84   Temp 97.7 °F (36.5 °C) (Temporal)   Resp 18   Ht 5' 5" (1.651 m)   Wt 97 kg (213 lb 13.5 oz)   LMP 01/01/1985 (Approximate) Comment: 1985  SpO2 98%   Breastfeeding No   BMI 35.59 kg/m²     PHYSICAL EXAMINATION:    GENERAL: Well appearing, in no acute distress, alert and oriented x3.  PSYCH:  Mood and affect appropriate.  SKIN: Skin color, texture, turgor normal, no rashes or lesions which will impact the procedure.  CV: RRR with palpation of the radial artery.  PULM: No evidence of respiratory difficulty, symmetric chest rise. Clear to auscultation.  NEURO: Cranial nerves grossly intact.    Plan:    Proceed with procedure as planned Procedure(s) (LRB):  Bilateral L4/5 TF WILBERTO (Bilateral)    Ras Caballero MD  08/18/2022            "

## 2022-08-18 NOTE — DISCHARGE INSTRUCTIONS

## 2022-08-18 NOTE — OP NOTE
INFORMED CONSENT: The procedure, risks, benefits and options were discussed with patient. There are no contraindications to the procedure. The patient expressed understanding and agreed to proceed. The personnel performing the procedure was discussed.    08/18/2022    Surgeon: Ras Caballeor MD    Assistants: None    Sedation: Conscious sedation provided by M.D    The patient was monitored with continuous pulse oximetry, EKG, and intermittent blood pressure monitors, immediately prior to administration of sedation.  The patient was hemodynamically stable throughout the entire process was responsive to voice, and breathing spontaneously.  Supplemental O2 was provided at 2L/min via nasal cannula.  Patient was comfortable for the duration of the procedure.     There was a total of 2mg IV Midazolam and 50mcg Fentanyl titrated for the procedure    Total sedation time was >10minutes and <20minutes      PROCEDURE:  Bilateral  L4/5  1) Left  L4/5 TRANSFORAMINAL EPIDURAL STEROID INJECTION  2) Right  L4/5 TRANSFORAMINAL EPIDURAL STEROID INJECTION      Pre Procedure diagnosis:  Bilateral L4/5 Bilateral lumbar radiculopathy [M54.16]    Post-Procedure diagnosis:   same    Complications: None    Specimens: None      DESCRIPTION OF PROCEDURE: The patient was brought to the procedure room. IV access was obtained prior to the procedure. The patient was positioned prone on the fluoroscopy table. Continuous hemodynamic monitoring was initiated including blood pressure, EKG, and pulse oximetry. . The skin was prepped with chlorhexidine and draped in a sterile fashion. Skin anesthesia was achieved using a total of 10mL of lidocaine, 5mL over each respective injection site.     The  L4/5 transforaminal spaces were identified with fluoroscopy in the  AP, oblique, and lateral views.  A 22 gauge spinal quinke needle was then advanced into the area of the trans foraminal spaces bilaterally with confirmation of proper needle position using  AP, oblique, and lateral fluoroscopic views. Once the needle tip was in the area of the transforaminal space, and there was no blood, CSF or paraesthesias,  1.5 mL of Omnipaque 300mg/ml was injected on each side for a total of 3mL.  Fluoroscopic imaging in the AP and lateral views revealed a clear outline of the spinal nerve with proximal spread of agent through the neural foramen into the epidural space. A total combination of 1 mL of Bupivicaine 0.25% and 40 mg depo medrol was injected on each side for a total of 4mL of injected medications with displacement of the contrast dye confirming that the medication went into the area of the transforaminal spaces bilaterally. A sterile dressing was applied.   Patient tolerated the procedure well.    Patient was taken back to the recovery room for further observation.     The patient was discharged to home in stable condition

## 2022-09-06 ENCOUNTER — PATIENT MESSAGE (OUTPATIENT)
Dept: INTERNAL MEDICINE | Facility: CLINIC | Age: 57
End: 2022-09-06
Payer: MEDICAID

## 2022-09-06 ENCOUNTER — PATIENT MESSAGE (OUTPATIENT)
Dept: PAIN MEDICINE | Facility: CLINIC | Age: 57
End: 2022-09-06
Payer: MEDICAID

## 2022-09-06 DIAGNOSIS — E55.9 VITAMIN D DEFICIENCY: ICD-10-CM

## 2022-09-06 DIAGNOSIS — R60.0 BILATERAL LEG EDEMA: ICD-10-CM

## 2022-09-06 DIAGNOSIS — Z29.89 PROPHYLACTIC IMMUNOTHERAPY: ICD-10-CM

## 2022-09-06 DIAGNOSIS — K21.00 GASTROESOPHAGEAL REFLUX DISEASE WITH ESOPHAGITIS WITHOUT HEMORRHAGE: ICD-10-CM

## 2022-09-07 ENCOUNTER — HOSPITAL ENCOUNTER (OUTPATIENT)
Dept: RADIOLOGY | Facility: HOSPITAL | Age: 57
Discharge: HOME | End: 2022-09-07
Attending: FAMILY MEDICINE
Payer: MEDICAID

## 2022-09-07 ENCOUNTER — OFFICE VISIT (OUTPATIENT)
Dept: OBSTETRICS AND GYNECOLOGY | Facility: CLINIC | Age: 57
End: 2022-09-07
Payer: MEDICAID

## 2022-09-07 VITALS — WEIGHT: 216.06 LBS | HEIGHT: 65 IN | BODY MASS INDEX: 36 KG/M2

## 2022-09-07 VITALS
BODY MASS INDEX: 36.07 KG/M2 | HEIGHT: 65 IN | WEIGHT: 216.5 LBS | DIASTOLIC BLOOD PRESSURE: 77 MMHG | SYSTOLIC BLOOD PRESSURE: 118 MMHG

## 2022-09-07 DIAGNOSIS — Z01.419 ENCOUNTER FOR GYNECOLOGICAL EXAMINATION WITHOUT ABNORMAL FINDING: Primary | ICD-10-CM

## 2022-09-07 DIAGNOSIS — Z12.31 ENCOUNTER FOR SCREENING MAMMOGRAM FOR BREAST CANCER: ICD-10-CM

## 2022-09-07 DIAGNOSIS — R87.629 ABNORMAL VAGINAL PAP SMEAR: ICD-10-CM

## 2022-09-07 DIAGNOSIS — Z78.0 POSTMENOPAUSAL: ICD-10-CM

## 2022-09-07 DIAGNOSIS — Z12.31 BREAST CANCER SCREENING BY MAMMOGRAM: ICD-10-CM

## 2022-09-07 DIAGNOSIS — Z01.419 PAP SMEAR, AS PART OF ROUTINE GYNECOLOGICAL EXAMINATION: ICD-10-CM

## 2022-09-07 PROCEDURE — 77063 MAMMO DIGITAL SCREENING BILAT WITH TOMO: ICD-10-PCS | Mod: 26,,, | Performed by: RADIOLOGY

## 2022-09-07 PROCEDURE — 87624 HPV HI-RISK TYP POOLED RSLT: CPT | Performed by: NURSE PRACTITIONER

## 2022-09-07 PROCEDURE — 99386 PR PREVENTIVE VISIT,NEW,40-64: ICD-10-PCS | Mod: S$PBB,,, | Performed by: NURSE PRACTITIONER

## 2022-09-07 PROCEDURE — 3074F SYST BP LT 130 MM HG: CPT | Mod: CPTII,,, | Performed by: NURSE PRACTITIONER

## 2022-09-07 PROCEDURE — 99214 OFFICE O/P EST MOD 30 MIN: CPT | Mod: PBBFAC | Performed by: NURSE PRACTITIONER

## 2022-09-07 PROCEDURE — 1160F PR REVIEW ALL MEDS BY PRESCRIBER/CLIN PHARMACIST DOCUMENTED: ICD-10-PCS | Mod: CPTII,,, | Performed by: NURSE PRACTITIONER

## 2022-09-07 PROCEDURE — 3008F BODY MASS INDEX DOCD: CPT | Mod: CPTII,,, | Performed by: NURSE PRACTITIONER

## 2022-09-07 PROCEDURE — 99386 PREV VISIT NEW AGE 40-64: CPT | Mod: S$PBB,,, | Performed by: NURSE PRACTITIONER

## 2022-09-07 PROCEDURE — 4010F ACE/ARB THERAPY RXD/TAKEN: CPT | Mod: CPTII,,, | Performed by: NURSE PRACTITIONER

## 2022-09-07 PROCEDURE — 77063 BREAST TOMOSYNTHESIS BI: CPT | Mod: 26,,, | Performed by: RADIOLOGY

## 2022-09-07 PROCEDURE — 3008F PR BODY MASS INDEX (BMI) DOCUMENTED: ICD-10-PCS | Mod: CPTII,,, | Performed by: NURSE PRACTITIONER

## 2022-09-07 PROCEDURE — 77067 MAMMO DIGITAL SCREENING BILAT WITH TOMO: ICD-10-PCS | Mod: 26,,, | Performed by: RADIOLOGY

## 2022-09-07 PROCEDURE — 4010F PR ACE/ARB THEARPY RXD/TAKEN: ICD-10-PCS | Mod: CPTII,,, | Performed by: NURSE PRACTITIONER

## 2022-09-07 PROCEDURE — 88141 CYTOPATH C/V INTERPRET: CPT | Mod: ,,, | Performed by: PATHOLOGY

## 2022-09-07 PROCEDURE — 77067 SCR MAMMO BI INCL CAD: CPT | Mod: TC

## 2022-09-07 PROCEDURE — 1160F RVW MEDS BY RX/DR IN RCRD: CPT | Mod: CPTII,,, | Performed by: NURSE PRACTITIONER

## 2022-09-07 PROCEDURE — 3078F DIAST BP <80 MM HG: CPT | Mod: CPTII,,, | Performed by: NURSE PRACTITIONER

## 2022-09-07 PROCEDURE — 1159F PR MEDICATION LIST DOCUMENTED IN MEDICAL RECORD: ICD-10-PCS | Mod: CPTII,,, | Performed by: NURSE PRACTITIONER

## 2022-09-07 PROCEDURE — 3074F PR MOST RECENT SYSTOLIC BLOOD PRESSURE < 130 MM HG: ICD-10-PCS | Mod: CPTII,,, | Performed by: NURSE PRACTITIONER

## 2022-09-07 PROCEDURE — 3078F PR MOST RECENT DIASTOLIC BLOOD PRESSURE < 80 MM HG: ICD-10-PCS | Mod: CPTII,,, | Performed by: NURSE PRACTITIONER

## 2022-09-07 PROCEDURE — 88175 CYTOPATH C/V AUTO FLUID REDO: CPT | Performed by: PATHOLOGY

## 2022-09-07 PROCEDURE — 77067 SCR MAMMO BI INCL CAD: CPT | Mod: 26,,, | Performed by: RADIOLOGY

## 2022-09-07 PROCEDURE — 99999 PR PBB SHADOW E&M-EST. PATIENT-LVL IV: ICD-10-PCS | Mod: PBBFAC,,, | Performed by: NURSE PRACTITIONER

## 2022-09-07 PROCEDURE — 77063 BREAST TOMOSYNTHESIS BI: CPT | Mod: TC

## 2022-09-07 PROCEDURE — 88141 PR  CYTOPATH CERV/VAG INTERPRET: ICD-10-PCS | Mod: ,,, | Performed by: PATHOLOGY

## 2022-09-07 PROCEDURE — 99999 PR PBB SHADOW E&M-EST. PATIENT-LVL IV: CPT | Mod: PBBFAC,,, | Performed by: NURSE PRACTITIONER

## 2022-09-07 PROCEDURE — 1159F MED LIST DOCD IN RCRD: CPT | Mod: CPTII,,, | Performed by: NURSE PRACTITIONER

## 2022-09-07 RX ORDER — FAMOTIDINE 20 MG/1
20 TABLET, FILM COATED ORAL 2 TIMES DAILY
Qty: 90 TABLET | Refills: 3 | Status: SHIPPED | OUTPATIENT
Start: 2022-09-07 | End: 2023-03-24 | Stop reason: SDUPTHER

## 2022-09-07 RX ORDER — ERGOCALCIFEROL 1.25 MG/1
50000 CAPSULE ORAL
Qty: 12 CAPSULE | Refills: 3 | Status: SHIPPED | OUTPATIENT
Start: 2022-09-07 | End: 2023-03-28 | Stop reason: SDUPTHER

## 2022-09-07 NOTE — PROGRESS NOTES
CC: Well woman exam    Marcie Bazan is a 56 y.o. female  presents for a well woman exam.  No issues, problems, or complaints.   Last gyn about 4 years ago  Hysterectomy with BSO in  due to abnormal pap  Has had liver transplant in    Needs mmg scheduled     Past Medical History:   Diagnosis Date    Alcohol abuse     Alcoholic hepatitis     Anemia of chronic disease     Arthritis     generialized    Cancer 2017    liver    Coagulopathy     Dyspnea on exertion 3/26/2020    Encounter for blood transfusion     End stage liver disease     History of alcohol abuse     Hyperlipidemia     Hypersomnia 2020    Hypertension     Hyponatremia     Liver cirrhosis     Liver transplant candidate 2017    Obesity (BMI 30-39.9) 2016    Sleep apnea      Past Surgical History:   Procedure Laterality Date    BILATERAL SALPINGO-OOPHORECTOMY (BSO) Bilateral     with hysterectomy done for abnormal cells    BREAST BIOPSY Left     benign    BREAST LUMPECTOMY      patient is unsure of date or laterality    CHOLECYSTECTOMY      COLONOSCOPY N/A 2017    Procedure: COLONOSCOPY;  Surgeon: Filemon Fuentes MD;  Location: [facility] ([address] [place] [address]);  Service: Endoscopy;  Laterality: N/A;    COLONOSCOPY N/A 2017    Procedure: COLONOSCOPY;  Surgeon: José Chavira MD;  Location: [facility] ([address] [place] [address]);  Service: Endoscopy;  Laterality: N/A;    EPIDURAL STEROID INJECTION INTO CERVICAL SPINE N/A 2022    Procedure: C5/6 IL WILBERTO;  Surgeon: Ras Caballero MD;  Location: [facility];  Service: Pain Management;  Laterality: N/A;    EXAMINATION UNDER ANESTHESIA N/A 2020    Procedure: EXAM UNDER ANESTHESIA;  Surgeon: Alden Horowitz MD;  Location: [facility] OR;  Service: General;  Laterality: N/A;    EXCISIONAL HEMORRHOIDECTOMY N/A 2020    Procedure: HEMORRHOIDECTOMY;  Surgeon: Alden Horowitz MD;  Location: [facility] OR;  Service: General;  Laterality: N/A;    HYSTERECTOMY      due to  abnormal paps - ovaries were removed    INJECTION OF ANESTHETIC AGENT AROUND PUDENDAL NERVE N/A 02/18/2020    Procedure: BLOCK, NERVE, PUDENDAL;  Surgeon: Alden Horowitz MD;  Location: [facility];  Service: General;  Laterality: N/A;    INJECTION OF ANESTHETIC AGENT INTO SACROILIAC JOINT Right 06/14/2019    Procedure: right Sacroiliac Joint Injection;  Surgeon: David Desai MD;  Location: [facility];  Service: Pain Management;  Laterality: Right;    INJECTION OF ANESTHETIC AGENT INTO SACROILIAC JOINT Bilateral 02/07/2020    Procedure: Bilateral GT bursa + Bilateral Sacroiliac Joint Injection;  Surgeon: David Desai MD;  Location: [facility];  Service: Pain Management;  Laterality: Bilateral;    INJECTION OF ANESTHETIC AGENT INTO SACROILIAC JOINT Bilateral 07/07/2020    Procedure: Bilateral GT bursa +SIJ injection;  Surgeon: David Desai MD;  Location: [facility];  Service: Pain Management;  Laterality: Bilateral;    INJECTION OF ANESTHETIC AGENT INTO SACROILIAC JOINT Right 10/21/2020    Procedure: Right piriformis + right SIJ + right GT bursa injection;  Surgeon: David Desai MD;  Location: [facility];  Service: Pain Management;  Laterality: Right;    INJECTION OF ANESTHETIC AGENT INTO SACROILIAC JOINT Bilateral 04/15/2021    Procedure: Bilateral SIJ + Bilateral Piriformis + Bilateral GT Bursa Injection;  Surgeon: Flcao Frazier MD;  Location: [facility];  Service: Pain Management;  Laterality: Bilateral;    INJECTION OF ANESTHETIC AGENT INTO SACROILIAC JOINT Bilateral 04/21/2022    Procedure: Bilateral SIJ + Bilateral Piriformis + Bilateral GT Bursa Injection;  Surgeon: Ras Caballero MD;  Location: [facility];  Service: Pain Management;  Laterality: Bilateral;    INJECTION OF JOINT Bilateral 02/07/2020    Procedure: Bilateral GT bursa + Bilateral Sacroiliac Joint Injection;  Surgeon: David Desai MD;  Location: [facility];  Service: Pain Management;  Laterality:  Bilateral;    INJECTION OF JOINT Bilateral 07/07/2020    Procedure: Bilateral GT bursa +SIJ injection;  Surgeon: David Desai MD;  Location: [facility];  Service: Pain Management;  Laterality: Bilateral;    INJECTION OF JOINT Right 10/21/2020    Procedure: Right piriformis + right SIJ + right GT bursa injection;  Surgeon: David Desai MD;  Location: [facility];  Service: Pain Management;  Laterality: Right;    INJECTION OF JOINT Bilateral 02/08/2021    Procedure: Bilateral GT Bursa Injection;  Surgeon: Anna Kaminski MD;  Location: [facility];  Service: Pain Management;  Laterality: Bilateral;    INJECTION OF JOINT Bilateral 04/15/2021    Procedure: Bilateral SIJ + Bilateral Piriformis + Bilateral GT Bursa Injection;  Surgeon: Flaco Frazier MD;  Location: [facility];  Service: Pain Management;  Laterality: Bilateral;    INJECTION OF JOINT Bilateral 04/21/2022    Procedure: Bilateral SIJ + Bilateral Piriformis + Bilateral GT Bursa Injection;  Surgeon: Ras Caballero MD;  Location: [facility];  Service: Pain Management;  Laterality: Bilateral;    INJECTION OF PIRIFORMIS MUSCLE Right 06/14/2019    Procedure: Right piriformis injection;  Surgeon: David Desai MD;  Location: [facility];  Service: Pain Management;  Laterality: Right;    INJECTION OF PIRIFORMIS MUSCLE Right 10/21/2020    Procedure: Right piriformis + right SIJ + right GT bursa injection;  Surgeon: David Desai MD;  Location: [facility];  Service: Pain Management;  Laterality: Right;    INJECTION OF PIRIFORMIS MUSCLE Bilateral 04/15/2021    Procedure: Bilateral SIJ + Bilateral Piriformis + Bilateral GT Bursa Injection;  Surgeon: Flaco Frazier MD;  Location: [facility];  Service: Pain Management;  Laterality: Bilateral;    INJECTION OF PIRIFORMIS MUSCLE Bilateral 04/21/2022    Procedure: Bilateral SIJ + Bilateral Piriformis + Bilateral GT Bursa Injection;  Surgeon: Ras Caballero MD;  Location: [facility];   "Service: Pain Management;  Laterality: Bilateral;    LIVER TRANSPLANT  2017    SELECTIVE INJECTION OF ANESTHETIC AGENT AROUND LUMBAR SPINAL NERVE ROOT BY TRANSFORAMINAL APPROACH Bilateral 10/28/2021    Procedure: BLOCK, SPINAL NERVE ROOT, LUMBAR, SELECTIVE, TRANSFORAMINAL APPROACH bilateral L4-5 and Right Knee injection with RN IV sedation;  Surgeon: Flaco Frazier MD;  Location: [facility];  Service: Pain Management;  Laterality: Bilateral;    SELECTIVE INJECTION OF ANESTHETIC AGENT AROUND LUMBAR SPINAL NERVE ROOT BY TRANSFORAMINAL APPROACH Bilateral 2022    Procedure: Bilateral L4/5 TF WILBERTO;  Surgeon: Ras Caballero MD;  Location: [facility];  Service: Pain Management;  Laterality: Bilateral;     Family History   Problem Relation Age of Onset    Hypertension Mother     Alzheimer's disease Mother     Hypertension Father     Diabetes Father     Diabetes Sister     Depression Maternal Grandfather     Breast cancer Paternal Aunt      Social History     Tobacco Use    Smoking status: Never    Smokeless tobacco: Never   Substance Use Topics    Alcohol use: No     Comment: Currently admitted to inpatient rehab 2017    Drug use: No     OB History          2    Para   2    Term   2            AB        Living             SAB        IAB        Ectopic        Multiple        Live Births                     /77   Ht 5' 5" (1.651 m)   Wt 98.2 kg (216 lb 7.9 oz)   LMP 1985 (Approximate) Comment:   BMI 36.03 kg/m²     ROS:  GENERAL: Denies weight gain or weight loss. Feeling well overall.   SKIN: Denies rash or lesions.   HEAD: Denies head injury or headache.   NODES: Denies enlarged lymph nodes.   CHEST: Denies chest pain or shortness of breath.   CARDIOVASCULAR: Denies palpitations or left sided chest pain.   ABDOMEN: No abdominal pain, constipation, diarrhea, nausea, vomiting or rectal bleeding.   URINARY: No frequency, dysuria, hematuria, or burning on " urination.  REPRODUCTIVE: See HPI.   BREASTS: The patient performs breast self-examination and denies pain, lumps, or nipple discharge.   HEMATOLOGIC: No easy bruisability or excessive bleeding.   MUSCULOSKELETAL: Denies joint pain or swelling.   NEUROLOGIC: Denies syncope or weakness.   PSYCHIATRIC: Denies depression, anxiety or mood swings.    PE:   APPEARANCE: Well nourished, well developed, in no acute distress.  AFFECT: WNL, alert and oriented x 3.  SKIN: No acne or hirsutism.  NECK: Neck symmetric without masses or thyromegaly.  NODES: No inguinal, cervical, axillary or femoral lymph node enlargement.  CHEST: Good respiratory effort.   ABDOMEN: Soft. No tenderness or masses. No hepatosplenomegaly. No hernias.  BREASTS: Symmetrical, no skin changes or visible lesions. No palpable masses, nipple discharge bilaterally.  PELVIC: Normal external female genitalia without lesions. Normal hair distribution. Adequate perineal body, normal urethral meatus. Vagina atrophic without lesions or discharge. No significant cystocele or rectocele. Bimanual exam shows uterus and cervix to be surgically absent. Adnexa absent    1. Encounter for gynecological examination without abnormal finding  Liquid-Based Pap Smear, Screening    HPV High Risk Genotypes, PCR      2. Pap smear, as part of routine gynecological examination  Ambulatory referral/consult to Gynecology      3. Abnormal vaginal Pap smear  Ambulatory referral/consult to Gynecology      4. Postmenopausal  Liquid-Based Pap Smear, Screening    HPV High Risk Genotypes, PCR      5. Encounter for screening mammogram for breast cancer         and PLAN:    Patient was counseled today on A.C.S. Pap guidelines and recommendations for yearly pelvic exams, mammograms and monthly self breast exams; to see her PCP for other health maintenance.

## 2022-09-07 NOTE — TELEPHONE ENCOUNTER
REFILL REQUEST APPROVED.  Requested Prescriptions   Pending Prescriptions Disp Refills    famotidine (PEPCID) 20 MG tablet 90 tablet 3     Sig: Take 1 tablet (20 mg total) by mouth 2 (two) times a day.    ergocalciferol (ERGOCALCIFEROL) 50,000 unit Cap 12 capsule 3     Sig: Take 1 capsule (50,000 Units total) by mouth every 7 days.

## 2022-09-07 NOTE — TELEPHONE ENCOUNTER
No new care gaps identified.  [facility] Embedded Care Gaps. Reference number: [id number]. 9/07/2022   7:34:31 AM LORNAT

## 2022-09-10 NOTE — PROGRESS NOTES
Marcie Scott.    I am happy to report that your recent breast imaging did NOT show evidence of cancer.    An annual mammogram is the best test to screen for breast cancer, but it is not perfect, and it can miss some cancers. So, even though your mammogram was normal, if you notice any lump or change in one of your breasts, please schedule an appointment with me for a proper evaluation.    Thanks for letting me care for you, and thanks for trusting [facility] with your healthcare needs.    Sincerely,    KATJA Munoz MD

## 2022-09-11 DIAGNOSIS — K59.09 OTHER CONSTIPATION: ICD-10-CM

## 2022-09-11 DIAGNOSIS — D63.8 ANEMIA OF CHRONIC DISEASE: Primary | ICD-10-CM

## 2022-09-13 LAB
HPV HR 12 DNA SPEC QL NAA+PROBE: NEGATIVE
HPV16 AG SPEC QL: NEGATIVE
HPV18 DNA SPEC QL NAA+PROBE: NEGATIVE

## 2022-09-14 LAB
FINAL PATHOLOGIC DIAGNOSIS: ABNORMAL
Lab: ABNORMAL

## 2022-09-16 ENCOUNTER — PATIENT MESSAGE (OUTPATIENT)
Dept: OBSTETRICS AND GYNECOLOGY | Facility: CLINIC | Age: 57
End: 2022-09-16
Payer: MEDICAID

## 2022-09-21 ENCOUNTER — OFFICE VISIT (OUTPATIENT)
Dept: PAIN MEDICINE | Facility: CLINIC | Age: 57
End: 2022-09-21
Payer: MEDICAID

## 2022-09-21 ENCOUNTER — LAB VISIT (OUTPATIENT)
Dept: LAB | Facility: HOSPITAL | Age: 57
End: 2022-09-21
Attending: INTERNAL MEDICINE
Payer: MEDICAID

## 2022-09-21 VITALS — BODY MASS INDEX: 35.95 KG/M2 | HEIGHT: 65 IN

## 2022-09-21 DIAGNOSIS — G89.29 INSOMNIA SECONDARY TO CHRONIC PAIN: ICD-10-CM

## 2022-09-21 DIAGNOSIS — M53.3 SACROILIAC JOINT DYSFUNCTION: ICD-10-CM

## 2022-09-21 DIAGNOSIS — M70.61 GREATER TROCHANTERIC BURSITIS, RIGHT: ICD-10-CM

## 2022-09-21 DIAGNOSIS — E83.42 HYPOMAGNESEMIA: ICD-10-CM

## 2022-09-21 DIAGNOSIS — G57.01 PIRIFORMIS SYNDROME, RIGHT: Primary | ICD-10-CM

## 2022-09-21 DIAGNOSIS — Z94.4 LIVER REPLACED BY TRANSPLANT: ICD-10-CM

## 2022-09-21 DIAGNOSIS — M47.818 ARTHROPATHY OF RIGHT SACROILIAC JOINT: ICD-10-CM

## 2022-09-21 DIAGNOSIS — G47.01 INSOMNIA SECONDARY TO CHRONIC PAIN: ICD-10-CM

## 2022-09-21 DIAGNOSIS — M79.18 PIRIFORMIS MUSCLE PAIN: ICD-10-CM

## 2022-09-21 LAB
ALBUMIN SERPL BCP-MCNC: 3.6 G/DL (ref 3.5–5.2)
ALP SERPL-CCNC: 181 U/L (ref 55–135)
ALT SERPL W/O P-5'-P-CCNC: 11 U/L (ref 10–44)
ANION GAP SERPL CALC-SCNC: 7 MMOL/L (ref 8–16)
AST SERPL-CCNC: 11 U/L (ref 10–40)
BASOPHILS # BLD AUTO: 0.04 K/UL (ref 0–0.2)
BASOPHILS NFR BLD: 0.5 % (ref 0–1.9)
BILIRUB SERPL-MCNC: 0.4 MG/DL (ref 0.1–1)
BUN SERPL-MCNC: 14 MG/DL (ref 6–20)
CALCIUM SERPL-MCNC: 9.1 MG/DL (ref 8.7–10.5)
CHLORIDE SERPL-SCNC: 107 MMOL/L (ref 95–110)
CO2 SERPL-SCNC: 27 MMOL/L (ref 23–29)
CREAT SERPL-MCNC: 0.8 MG/DL (ref 0.5–1.4)
DIFFERENTIAL METHOD: ABNORMAL
EOSINOPHIL # BLD AUTO: 0.3 K/UL (ref 0–0.5)
EOSINOPHIL NFR BLD: 3.2 % (ref 0–8)
ERYTHROCYTE [DISTWIDTH] IN BLOOD BY AUTOMATED COUNT: 14.8 % (ref 11.5–14.5)
EST. GFR  (NO RACE VARIABLE): >60 ML/MIN/1.73 M^2
GGT SERPL-CCNC: 28 U/L (ref 8–55)
GLUCOSE SERPL-MCNC: 89 MG/DL (ref 70–110)
HCT VFR BLD AUTO: 41.4 % (ref 37–48.5)
HGB BLD-MCNC: 12.1 G/DL (ref 12–16)
IMM GRANULOCYTES # BLD AUTO: 0.02 K/UL (ref 0–0.04)
IMM GRANULOCYTES NFR BLD AUTO: 0.2 % (ref 0–0.5)
LYMPHOCYTES # BLD AUTO: 2.2 K/UL (ref 1–4.8)
LYMPHOCYTES NFR BLD: 24.9 % (ref 18–48)
MAGNESIUM SERPL-MCNC: 1.6 MG/DL (ref 1.6–2.6)
MCH RBC QN AUTO: 25.8 PG (ref 27–31)
MCHC RBC AUTO-ENTMCNC: 29.2 G/DL (ref 32–36)
MCV RBC AUTO: 88 FL (ref 82–98)
MONOCYTES # BLD AUTO: 0.6 K/UL (ref 0.3–1)
MONOCYTES NFR BLD: 6.8 % (ref 4–15)
NEUTROPHILS # BLD AUTO: 5.6 K/UL (ref 1.8–7.7)
NEUTROPHILS NFR BLD: 64.4 % (ref 38–73)
NRBC BLD-RTO: 0 /100 WBC
PLATELET # BLD AUTO: 92 K/UL (ref 150–450)
PMV BLD AUTO: 10.5 FL (ref 9.2–12.9)
POTASSIUM SERPL-SCNC: 4.3 MMOL/L (ref 3.5–5.1)
PROT SERPL-MCNC: 6.6 G/DL (ref 6–8.4)
RBC # BLD AUTO: 4.69 M/UL (ref 4–5.4)
SODIUM SERPL-SCNC: 141 MMOL/L (ref 136–145)
WBC # BLD AUTO: 8.7 K/UL (ref 3.9–12.7)

## 2022-09-21 PROCEDURE — 4010F PR ACE/ARB THEARPY RXD/TAKEN: ICD-10-PCS | Mod: CPTII,95,, | Performed by: PHYSICIAN ASSISTANT

## 2022-09-21 PROCEDURE — 80197 ASSAY OF TACROLIMUS: CPT | Performed by: INTERNAL MEDICINE

## 2022-09-21 PROCEDURE — 4010F ACE/ARB THERAPY RXD/TAKEN: CPT | Mod: CPTII,95,, | Performed by: PHYSICIAN ASSISTANT

## 2022-09-21 PROCEDURE — 36415 COLL VENOUS BLD VENIPUNCTURE: CPT | Performed by: INTERNAL MEDICINE

## 2022-09-21 PROCEDURE — 83735 ASSAY OF MAGNESIUM: CPT | Performed by: INTERNAL MEDICINE

## 2022-09-21 PROCEDURE — 1160F PR REVIEW ALL MEDS BY PRESCRIBER/CLIN PHARMACIST DOCUMENTED: ICD-10-PCS | Mod: CPTII,95,, | Performed by: PHYSICIAN ASSISTANT

## 2022-09-21 PROCEDURE — 99214 PR OFFICE/OUTPT VISIT, EST, LEVL IV, 30-39 MIN: ICD-10-PCS | Mod: 95,,, | Performed by: PHYSICIAN ASSISTANT

## 2022-09-21 PROCEDURE — 80053 COMPREHEN METABOLIC PANEL: CPT | Performed by: INTERNAL MEDICINE

## 2022-09-21 PROCEDURE — 1159F PR MEDICATION LIST DOCUMENTED IN MEDICAL RECORD: ICD-10-PCS | Mod: CPTII,95,, | Performed by: PHYSICIAN ASSISTANT

## 2022-09-21 PROCEDURE — 3008F BODY MASS INDEX DOCD: CPT | Mod: CPTII,95,, | Performed by: PHYSICIAN ASSISTANT

## 2022-09-21 PROCEDURE — 85025 COMPLETE CBC W/AUTO DIFF WBC: CPT | Performed by: INTERNAL MEDICINE

## 2022-09-21 PROCEDURE — 82977 ASSAY OF GGT: CPT | Performed by: INTERNAL MEDICINE

## 2022-09-21 PROCEDURE — 99214 OFFICE O/P EST MOD 30 MIN: CPT | Mod: 95,,, | Performed by: PHYSICIAN ASSISTANT

## 2022-09-21 PROCEDURE — 1160F RVW MEDS BY RX/DR IN RCRD: CPT | Mod: CPTII,95,, | Performed by: PHYSICIAN ASSISTANT

## 2022-09-21 PROCEDURE — 1159F MED LIST DOCD IN RCRD: CPT | Mod: CPTII,95,, | Performed by: PHYSICIAN ASSISTANT

## 2022-09-21 PROCEDURE — 3008F PR BODY MASS INDEX (BMI) DOCUMENTED: ICD-10-PCS | Mod: CPTII,95,, | Performed by: PHYSICIAN ASSISTANT

## 2022-09-21 RX ORDER — CYCLOBENZAPRINE HCL 10 MG
TABLET ORAL
Qty: 90 TABLET | Refills: 1 | Status: SHIPPED | OUTPATIENT
Start: 2022-09-21 | End: 2023-02-09 | Stop reason: SDUPTHER

## 2022-09-21 RX ORDER — AMITRIPTYLINE HYDROCHLORIDE 25 MG/1
25-50 TABLET, FILM COATED ORAL NIGHTLY PRN
Qty: 60 TABLET | Refills: 1 | Status: SHIPPED | OUTPATIENT
Start: 2022-09-21 | End: 2023-02-09 | Stop reason: SDUPTHER
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
## 2022-09-22 LAB — TACROLIMUS BLD-MCNC: 5.6 NG/ML (ref 5–15)

## 2022-09-23 ENCOUNTER — TELEPHONE (OUTPATIENT)
Dept: TRANSPLANT | Facility: CLINIC | Age: 57
End: 2022-09-23
Payer: MEDICAID

## 2022-09-23 ENCOUNTER — PATIENT MESSAGE (OUTPATIENT)
Dept: TRANSPLANT | Facility: CLINIC | Age: 57
End: 2022-09-23
Payer: MEDICAID

## 2022-09-23 NOTE — TELEPHONE ENCOUNTER
Letter sent to patient stating: Your labs have been reviewed by your Transplant physician, no action required. Next labs due 1/9/23        ----- Message from Joselin Souza MD sent at 9/23/2022  2:53 PM CDT -----  Reviewed, nothing to do; repeat per routine

## 2022-09-23 NOTE — LETTER
September 23, 2022    Marcie Bazan  [address] [facility] [address]          Dear Marcie Bazan:  MRN: [id number]    This is a follow up to your recent labs, your lab results were stable.  There are no medicine changes.  Please have your labs drawn again on 1/9/23.      If you cannot have your labs drawn on the scheduled date, it is your responsibility to call the transplant department to reschedule.  Please call [phone] and ask to speak to Mica PURVIS -  for all scheduling requests.     Sincerely,    Viola SANDHUN, RN        Your Liver Transplant Coordinator    [facility]  [address] [place] [id number] [phone]

## 2022-09-28 ENCOUNTER — PATIENT MESSAGE (OUTPATIENT)
Dept: PAIN MEDICINE | Facility: CLINIC | Age: 57
End: 2022-09-28
Payer: MEDICAID

## 2022-09-29 ENCOUNTER — TELEPHONE (OUTPATIENT)
Dept: OBSTETRICS AND GYNECOLOGY | Facility: CLINIC | Age: 57
End: 2022-09-29
Payer: MEDICAID

## 2022-09-29 NOTE — TELEPHONE ENCOUNTER
----- Message from Ariana Sukh sent at 9/29/2022  1:32 PM CDT -----  Pt would like to reschedule the appt 10/7/2022 because she will be out of town. Call back number is [phone]. Thx. EL

## 2022-10-05 NOTE — PRE-PROCEDURE INSTRUCTIONS
Spoke with patient regarding procedure scheduled on 10.11    Arrival time 0840    Has patient been sick with fever or on antibiotics within the last 7 days? No    Does the patient have any open wounds, sores or rashes? No    Does the patient have any recent fractures? no    Has patient received a vaccination within the last 7 days? No    Received the COVID vaccination? yes    Has the patient stopped all medications as directed? NA    Does patient have a pacemaker and or defibrillator? no    Does the patient have a ride to and from procedure and someone reliable to remain with patient? Yaniv rodriguez    Is the patient diabetic? no    Does the patient have sleep apnea? Or use O2 at home? Jem on cpap  Is the patient receiving sedation? yes    Is the patient instructed to remain NPO beginning at midnight the night before their procedure? yes    Procedure location confirmed with patient? Yes    Covid- Denies signs/symptoms. Instructed to notify PAT/MD if any changes.

## 2022-10-10 ENCOUNTER — TELEPHONE (OUTPATIENT)
Dept: OBSTETRICS AND GYNECOLOGY | Facility: CLINIC | Age: 57
End: 2022-10-10
Payer: MEDICAID

## 2022-10-10 NOTE — TELEPHONE ENCOUNTER
----- Message from Lucas Regan sent at 10/10/2022  7:09 AM CDT -----  Contact: Marcie Patterson is requesting a call to reschedule her procedures. Please call her back at [phone].          Thanks  DD

## 2022-10-11 ENCOUNTER — HOSPITAL ENCOUNTER (OUTPATIENT)
Facility: HOSPITAL | Age: 57
Discharge: HOME | End: 2022-10-11
Attending: PHYSICAL MEDICINE & REHABILITATION | Admitting: PHYSICAL MEDICINE & REHABILITATION
Payer: MEDICAID

## 2022-10-11 ENCOUNTER — HOSPITAL ENCOUNTER (OUTPATIENT)
Dept: PREADMISSION TESTING | Facility: HOSPITAL | Age: 57
Discharge: HOME | End: 2022-10-11
Attending: INTERNAL MEDICINE
Payer: MEDICAID

## 2022-10-11 VITALS
WEIGHT: 219.13 LBS | HEART RATE: 84 BPM | RESPIRATION RATE: 17 BRPM | TEMPERATURE: 97 F | HEIGHT: 65 IN | DIASTOLIC BLOOD PRESSURE: 73 MMHG | BODY MASS INDEX: 36.51 KG/M2 | SYSTOLIC BLOOD PRESSURE: 118 MMHG | OXYGEN SATURATION: 98 %

## 2022-10-11 DIAGNOSIS — K59.09 OTHER CONSTIPATION: ICD-10-CM

## 2022-10-11 DIAGNOSIS — M46.1 SACROILIITIS: ICD-10-CM

## 2022-10-11 DIAGNOSIS — D63.8 ANEMIA OF CHRONIC DISEASE: ICD-10-CM

## 2022-10-11 DIAGNOSIS — M53.3 SACROILIAC JOINT DYSFUNCTION: Primary | ICD-10-CM

## 2022-10-11 PROCEDURE — 20610 DRAIN/INJ JOINT/BURSA W/O US: CPT | Mod: 59,RT,, | Performed by: PHYSICAL MEDICINE & REHABILITATION

## 2022-10-11 PROCEDURE — 27096 INJECT SACROILIAC JOINT: CPT | Mod: RT,,, | Performed by: PHYSICAL MEDICINE & REHABILITATION

## 2022-10-11 PROCEDURE — 25500020 PHARM REV CODE 255: Performed by: PHYSICAL MEDICINE & REHABILITATION

## 2022-10-11 PROCEDURE — 63600175 PHARM REV CODE 636 W HCPCS: Performed by: PHYSICAL MEDICINE & REHABILITATION

## 2022-10-11 PROCEDURE — 27096 PR INJECTION,SACROILIAC JOINT: ICD-10-PCS | Mod: RT,,, | Performed by: PHYSICAL MEDICINE & REHABILITATION

## 2022-10-11 PROCEDURE — 20552 NJX 1/MLT TRIGGER POINT 1/2: CPT | Performed by: PHYSICAL MEDICINE & REHABILITATION

## 2022-10-11 PROCEDURE — A9585 GADOBUTROL INJECTION: HCPCS | Performed by: PHYSICAL MEDICINE & REHABILITATION

## 2022-10-11 PROCEDURE — 20552 NJX 1/MLT TRIGGER POINT 1/2: CPT | Mod: 59,,, | Performed by: PHYSICAL MEDICINE & REHABILITATION

## 2022-10-11 PROCEDURE — 20610 DRAIN/INJ JOINT/BURSA W/O US: CPT | Performed by: PHYSICAL MEDICINE & REHABILITATION

## 2022-10-11 PROCEDURE — 20610 PR DRAIN/INJECT LARGE JOINT/BURSA: ICD-10-PCS | Mod: 59,RT,, | Performed by: PHYSICAL MEDICINE & REHABILITATION

## 2022-10-11 PROCEDURE — 25000003 PHARM REV CODE 250: Performed by: PHYSICAL MEDICINE & REHABILITATION

## 2022-10-11 PROCEDURE — 20552 PR INJECT TRIGGER POINT, 1 OR 2: ICD-10-PCS | Mod: 59,,, | Performed by: PHYSICAL MEDICINE & REHABILITATION

## 2022-10-11 PROCEDURE — 27096 INJECT SACROILIAC JOINT: CPT | Performed by: PHYSICAL MEDICINE & REHABILITATION

## 2022-10-11 RX ORDER — BUPIVACAINE HYDROCHLORIDE 2.5 MG/ML
INJECTION, SOLUTION EPIDURAL; INFILTRATION; INTRACAUDAL
Status: DISCONTINUED | OUTPATIENT
Start: 2022-10-11 | End: 2022-10-11 | Stop reason: HOSPADM

## 2022-10-11 RX ORDER — ONDANSETRON 2 MG/ML
4 INJECTION INTRAMUSCULAR; INTRAVENOUS ONCE AS NEEDED
Status: DISCONTINUED | OUTPATIENT
Start: 2022-10-11 | End: 2022-10-11 | Stop reason: HOSPADM

## 2022-10-11 RX ORDER — FENTANYL CITRATE 50 UG/ML
INJECTION, SOLUTION INTRAMUSCULAR; INTRAVENOUS
Status: DISCONTINUED | OUTPATIENT
Start: 2022-10-11 | End: 2022-10-11 | Stop reason: HOSPADM

## 2022-10-11 RX ORDER — METHYLPREDNISOLONE ACETATE 40 MG/ML
INJECTION, SUSPENSION INTRA-ARTICULAR; INTRALESIONAL; INTRAMUSCULAR; SOFT TISSUE
Status: DISCONTINUED | OUTPATIENT
Start: 2022-10-11 | End: 2022-10-11 | Stop reason: HOSPADM

## 2022-10-11 RX ORDER — MIDAZOLAM HYDROCHLORIDE 1 MG/ML
INJECTION, SOLUTION INTRAMUSCULAR; INTRAVENOUS
Status: DISCONTINUED | OUTPATIENT
Start: 2022-10-11 | End: 2022-10-11 | Stop reason: HOSPADM

## 2022-10-11 RX ORDER — GADOBUTROL 604.72 MG/ML
INJECTION INTRAVENOUS
Status: DISCONTINUED | OUTPATIENT
Start: 2022-10-11 | End: 2022-10-11 | Stop reason: HOSPADM

## 2022-10-11 NOTE — DISCHARGE INSTRUCTIONS

## 2022-10-11 NOTE — DISCHARGE SUMMARY
Discharge Note  Short Stay      SUMMARY     Admit Date: 10/11/2022    Attending Physician: Ras Caballero MD    Discharge Physician: Ras Caballero MD      Discharge Date: 10/11/2022 10:30 AM    Procedure(s) (LRB):  right SIJ + right piriformis + right GT bursa injection (Right)    Final Diagnosis: Piriformis syndrome, right [G57.01]  Arthropathy of right sacroiliac joint [M47.818]  Greater trochanteric bursitis, right [M70.61]    Disposition: Home or self care    Patient Instructions:   Current Discharge Medication List        CONTINUE these medications which have NOT CHANGED    Details   losartan (COZAAR) 25 MG tablet Take 1 tablet (25 mg total) by mouth once daily.  Qty: 90 tablet, Refills: 3    Comments: -  Associated Diagnoses: Essential hypertension      NIFEdipine (PROCARDIA-XL) 60 MG (OSM) 24 hr tablet TAKE 1 TABLET(60 MG) BY MOUTH EVERY EVENING  Qty: 90 tablet, Refills: 3    Comments: -  Associated Diagnoses: Essential hypertension      albuterol (VENTOLIN HFA) 90 mcg/actuation inhaler Inhale 2 puffs into the lungs every 6 (six) hours as needed for Wheezing. Rescue  Qty: 18 g, Refills: 0    Associated Diagnoses: Acute bronchitis due to COVID-19 virus      amitriptyline (ELAVIL) 25 MG tablet Take 1-2 tablets (25-50 mg total) by mouth nightly as needed for Insomnia or Pain.  Qty: 60 tablet, Refills: 1    Associated Diagnoses: Insomnia secondary to chronic pain      atorvastatin (LIPITOR) 40 MG tablet Take 1 tablet (40 mg total) by mouth every evening.  Qty: 90 tablet, Refills: 2    Associated Diagnoses: Mixed hyperlipidemia      cyclobenzaprine (FLEXERIL) 10 MG tablet TAKE 1/2 TO 1 TABLET BY MOUTH THREE TIMES DAILY AS NEEDED FOR MUSCLE SPASMS  Qty: 90 tablet, Refills: 1    Associated Diagnoses: Piriformis muscle pain      ergocalciferol (ERGOCALCIFEROL) 50,000 unit Cap Take 1 capsule (50,000 Units total) by mouth every 7 days.  Qty: 12 capsule, Refills: 3    Comments: -  Associated Diagnoses: Vitamin D  deficiency      famotidine (PEPCID) 20 MG tablet Take 1 tablet (20 mg total) by mouth 2 (two) times a day.  Qty: 90 tablet, Refills: 3    Comments: -  Associated Diagnoses: Gastroesophageal reflux disease with esophagitis without hemorrhage      furosemide (LASIX) 40 MG tablet TAKE 1 TABLET BY MOUTH EVERY DAY  Qty: 30 tablet, Refills: 5    Associated Diagnoses: Prophylactic immunotherapy; Bilateral leg edema      magnesium oxide (MAG-OX) 400 mg (241.3 mg magnesium) tablet Take 1 tablet (400 mg total) by mouth 2 (two) times daily.  Qty: 60 tablet, Refills: 2    Associated Diagnoses: Hypomagnesemia      pantoprazole (PROTONIX) 40 MG tablet Take 1 tablet (40 mg total) by mouth once daily.  Qty: 90 tablet, Refills: 3    Comments: -  Associated Diagnoses: Gastroesophageal reflux disease with esophagitis without hemorrhage      potassium chloride (KLOR-CON) 10 MEQ TbSR Take 1 tablet (10 mEq total) by mouth 2 (two) times daily.  Qty: 180 tablet, Refills: 2      pregabalin (LYRICA) 225 MG Cap Take 1 capsule (225 mg total) by mouth 2 (two) times daily.  Qty: 60 capsule, Refills: 1      tacrolimus (PROGRAF) 1 MG Cap TAKE 2 CAPSULES BY MOUTH EVERY MORNING AND 1 CAPSULE EVERY EVENING  Qty: 90 capsule, Refills: 11    Associated Diagnoses: Liver replaced by transplant                 Discharge Diagnosis: Piriformis syndrome, right [G57.01]  Arthropathy of right sacroiliac joint [M47.818]  Greater trochanteric bursitis, right [M70.61]  Condition on Discharge: Stable with no complications to procedure   Diet on Discharge: Same as before.  Activity: as per instruction sheet.  Discharge to: Home with a responsible adult.  Follow up: 2-4 weeks       Please call the office at [phone] if you experience any weakness or loss of sensation, fever > 101.5, pain uncontrolled with oral medications, persistent nausea/vomiting/or diarrhea, redness or drainage from the incisions, or any other worrisome concerns. If physician on call was  not reached or could not communicate with our office for any reason please go to the nearest emergency department

## 2022-10-11 NOTE — H&P
HPI  Patient presenting for Procedure(s) (LRB):  right SIJ + right piriformis + right GT bursa injection (Right)     No health changes since previous encounter    Past Medical History:   Diagnosis Date    Alcohol abuse     Alcoholic hepatitis     Anemia of chronic disease     Arthritis     generialized    Cancer 12/26/2017    liver    Coagulopathy     Dyspnea on exertion 3/26/2020    Encounter for blood transfusion     End stage liver disease     History of alcohol abuse     Hyperlipidemia     Hypersomnia 9/11/2020    Hypertension     Hyponatremia     Liver cirrhosis     Liver transplant candidate 12/26/2017    Obesity (BMI 30-39.9) 1/5/2016    Sleep apnea      Past Surgical History:   Procedure Laterality Date    BILATERAL SALPINGO-OOPHORECTOMY (BSO) Bilateral 1986    with hysterectomy done for abnormal cells    BREAST BIOPSY Left     benign    BREAST LUMPECTOMY      patient is unsure of date or laterality    CHOLECYSTECTOMY      COLONOSCOPY N/A 12/01/2017    Procedure: COLONOSCOPY;  Surgeon: Filemon Fuentes MD;  Location: [facility] ENDO ([address] [place] [address]);  Service: Endoscopy;  Laterality: N/A;    COLONOSCOPY N/A 12/05/2017    Procedure: COLONOSCOPY;  Surgeon: José Chavira MD;  Location: [facility] ENDO ([address] [place] [address]);  Service: Endoscopy;  Laterality: N/A;    EPIDURAL STEROID INJECTION INTO CERVICAL SPINE N/A 01/04/2022    Procedure: C5/6 IL WILBERTO;  Surgeon: Ras Caballero MD;  Location: [facility] PAIN MGT;  Service: Pain Management;  Laterality: N/A;    EXAMINATION UNDER ANESTHESIA N/A 02/18/2020    Procedure: EXAM UNDER ANESTHESIA;  Surgeon: Alden Horowitz MD;  Location: [facility] OR;  Service: General;  Laterality: N/A;    EXCISIONAL HEMORRHOIDECTOMY N/A 02/18/2020    Procedure: HEMORRHOIDECTOMY;  Surgeon: Alden Horowitz MD;  Location: [facility] OR;  Service: General;  Laterality: N/A;    HYSTERECTOMY  1986    due to abnormal paps - ovaries were removed    INJECTION OF ANESTHETIC AGENT AROUND PUDENDAL NERVE N/A 02/18/2020    Procedure:  BLOCK, NERVE, PUDENDAL;  Surgeon: Alden Horowitz MD;  Location: [facility] OR;  Service: General;  Laterality: N/A;    INJECTION OF ANESTHETIC AGENT INTO SACROILIAC JOINT Right 06/14/2019    Procedure: right Sacroiliac Joint Injection;  Surgeon: David Desai MD;  Location: [facility] PAIN MGT;  Service: Pain Management;  Laterality: Right;    INJECTION OF ANESTHETIC AGENT INTO SACROILIAC JOINT Bilateral 02/07/2020    Procedure: Bilateral GT bursa + Bilateral Sacroiliac Joint Injection;  Surgeon: David Desai MD;  Location: [facility] PAIN MGT;  Service: Pain Management;  Laterality: Bilateral;    INJECTION OF ANESTHETIC AGENT INTO SACROILIAC JOINT Bilateral 07/07/2020    Procedure: Bilateral GT bursa +SIJ injection;  Surgeon: David Desai MD;  Location: [facility] PAIN MGT;  Service: Pain Management;  Laterality: Bilateral;    INJECTION OF ANESTHETIC AGENT INTO SACROILIAC JOINT Right 10/21/2020    Procedure: Right piriformis + right SIJ + right GT bursa injection;  Surgeon: David Desai MD;  Location: [facility] PAIN MGT;  Service: Pain Management;  Laterality: Right;    INJECTION OF ANESTHETIC AGENT INTO SACROILIAC JOINT Bilateral 04/15/2021    Procedure: Bilateral SIJ + Bilateral Piriformis + Bilateral GT Bursa Injection;  Surgeon: Flaco Frazier MD;  Location: [facility] PAIN MGT;  Service: Pain Management;  Laterality: Bilateral;    INJECTION OF ANESTHETIC AGENT INTO SACROILIAC JOINT Bilateral 04/21/2022    Procedure: Bilateral SIJ + Bilateral Piriformis + Bilateral GT Bursa Injection;  Surgeon: Ras Caballero MD;  Location: [facility] PAIN MGT;  Service: Pain Management;  Laterality: Bilateral;    INJECTION OF JOINT Bilateral 02/07/2020    Procedure: Bilateral GT bursa + Bilateral Sacroiliac Joint Injection;  Surgeon: David Desai MD;  Location: [facility] PAIN MGT;  Service: Pain Management;  Laterality: Bilateral;    INJECTION OF JOINT Bilateral 07/07/2020    Procedure: Bilateral GT bursa +SIJ injection;  Surgeon: David Desai  MD;  Location: [facility] PAIN MGT;  Service: Pain Management;  Laterality: Bilateral;    INJECTION OF JOINT Right 10/21/2020    Procedure: Right piriformis + right SIJ + right GT bursa injection;  Surgeon: David Desai MD;  [facility];  Service: Pain Management;  Laterality: Right;    INJECTION OF JOINT Bilateral 02/08/2021    Procedure: Bilateral GT Bursa Injection;  Surgeon: Anna Kaminski MD;  Location: [facility] PAIN MGT;  Service: Pain Management;  Laterality: Bilateral;    INJECTION OF JOINT Bilateral 04/15/2021    Procedure: Bilateral SIJ + Bilateral Piriformis + Bilateral GT Bursa Injection;  Surgeon: Flaco Frazier MD;  Location: [facility] PAIN MGT;  Service: Pain Management;  Laterality: Bilateral;    INJECTION OF JOINT Bilateral 04/21/2022    Procedure: Bilateral SIJ + Bilateral Piriformis + Bilateral GT Bursa Injection;  Surgeon: Ras Caballero MD;  Location: [facility] PAIN MGT;  Service: Pain Management;  Laterality: Bilateral;    INJECTION OF PIRIFORMIS MUSCLE Right 06/14/2019    Procedure: Right piriformis injection;  Surgeon: David Desai MD;  Location: [facility] PAIN MGT;  Service: Pain Management;  Laterality: Right;    INJECTION OF PIRIFORMIS MUSCLE Right 10/21/2020    Procedure: Right piriformis + right SIJ + right GT bursa injection;  Surgeon: David Desai MD;  Location: [facility] PAIN MGT;  Service: Pain Management;  Laterality: Right;    INJECTION OF PIRIFORMIS MUSCLE Bilateral 04/15/2021    Procedure: Bilateral SIJ + Bilateral Piriformis + Bilateral GT Bursa Injection;  Surgeon: Flaco Frazier MD;  Location: [facility] PAIN MGT;  Service: Pain Management;  Laterality: Bilateral;    INJECTION OF PIRIFORMIS MUSCLE Bilateral 04/21/2022    Procedure: Bilateral SIJ + Bilateral Piriformis + Bilateral GT Bursa Injection;  Surgeon: Ras Caballero MD;  Location: [facility] PAIN MGT;  Service: Pain Management;  Laterality: Bilateral;    LIVER TRANSPLANT  12/2017    SELECTIVE INJECTION OF ANESTHETIC AGENT AROUND  LUMBAR SPINAL NERVE ROOT BY TRANSFORAMINAL APPROACH Bilateral 10/28/2021    Procedure: BLOCK, SPINAL NERVE ROOT, LUMBAR, SELECTIVE, TRANSFORAMINAL APPROACH bilateral L4-5 and Right Knee injection with RN IV sedation;  Surgeon: Flaco Frazier MD;  Location: [facility] PAIN MGT;  Service: Pain Management;  Laterality: Bilateral;    SELECTIVE INJECTION OF ANESTHETIC AGENT AROUND LUMBAR SPINAL NERVE ROOT BY TRANSFORAMINAL APPROACH Bilateral 08/18/2022    Procedure: Bilateral L4/5 TF WILBERTO;  Surgeon: Ras Caballero MD;  Location: [facility] PAIN MGT;  Service: Pain Management;  Laterality: Bilateral;     Review of patient's allergies indicates:   Allergen Reactions    Ferrous sulfate Other (See Comments)     Patient states the pill makes her sick. She stated she would rather have a shot        Current Facility-Administered Medications on File Prior to Encounter   Medication Dose Route Frequency Provider Last Rate Last Admin    ondansetron injection 4 mg  4 mg Intravenous Once PRN Ras Caballero MD         Current Outpatient Medications on File Prior to Encounter   Medication Sig Dispense Refill    losartan (COZAAR) 25 MG tablet Take 1 tablet (25 mg total) by mouth once daily. 90 tablet 3    NIFEdipine (PROCARDIA-XL) 60 MG (OSM) 24 hr tablet TAKE 1 TABLET(60 MG) BY MOUTH EVERY EVENING 90 tablet 3    albuterol (VENTOLIN HFA) 90 mcg/actuation inhaler Inhale 2 puffs into the lungs every 6 (six) hours as needed for Wheezing. Rescue 18 g 0    amitriptyline (ELAVIL) 25 MG tablet Take 1-2 tablets (25-50 mg total) by mouth nightly as needed for Insomnia or Pain. 60 tablet 1    atorvastatin (LIPITOR) 40 MG tablet Take 1 tablet (40 mg total) by mouth every evening. 90 tablet 2    cyclobenzaprine (FLEXERIL) 10 MG tablet TAKE 1/2 TO 1 TABLET BY MOUTH THREE TIMES DAILY AS NEEDED FOR MUSCLE SPASMS 90 tablet 1    ergocalciferol (ERGOCALCIFEROL) 50,000 unit Cap Take 1 capsule (50,000 Units total) by mouth every 7 days. 12 capsule 3    famotidine  "(PEPCID) 20 MG tablet Take 1 tablet (20 mg total) by mouth 2 (two) times a day. 90 tablet 3    furosemide (LASIX) 40 MG tablet TAKE 1 TABLET BY MOUTH EVERY DAY 30 tablet 5    magnesium oxide (MAG-OX) 400 mg (241.3 mg magnesium) tablet Take 1 tablet (400 mg total) by mouth 2 (two) times daily. 60 tablet 2    pantoprazole (PROTONIX) 40 MG tablet Take 1 tablet (40 mg total) by mouth once daily. 90 tablet 3    potassium chloride (KLOR-CON) 10 MEQ TbSR Take 1 tablet (10 mEq total) by mouth 2 (two) times daily. 180 tablet 2    pregabalin (LYRICA) 225 MG Cap Take 1 capsule (225 mg total) by mouth 2 (two) times daily. 60 capsule 1    tacrolimus (PROGRAF) 1 MG Cap TAKE 2 CAPSULES BY MOUTH EVERY MORNING AND 1 CAPSULE EVERY EVENING 90 capsule 11        PMHx, PSHx, Allergies, Medications reviewed in epic    ROS negative except pain complaints in HPI    OBJECTIVE:    /87 (BP Location: Right arm, Patient Position: Sitting)   Pulse 87   Temp 97.7 °F (36.5 °C) (Tympanic)   Resp 16   Ht 5' 5" (1.651 m)   Wt 99.4 kg (219 lb 2.2 oz)   LMP 01/01/1985 (Approximate) Comment: 1985  SpO2 96%   Breastfeeding No   BMI 36.47 kg/m²     PHYSICAL EXAMINATION:    GENERAL: Well appearing, in no acute distress, alert and oriented x3.  PSYCH:  Mood and affect appropriate.  SKIN: Skin color, texture, turgor normal, no rashes or lesions which will impact the procedure.  CV: RRR with palpation of the radial artery.  PULM: No evidence of respiratory difficulty, symmetric chest rise. Clear to auscultation.  NEURO: Cranial nerves grossly intact.    Plan:    Proceed with procedure as planned Procedure(s) (LRB):  right SIJ + right piriformis + right GT bursa injection (Right)    Ras Caballero MD  10/11/2022            "

## 2022-10-11 NOTE — OP NOTE
Time-out taken to identify patient and procedure side prior to starting the procedure.     10/11/2022      PROCEDURE:  1) Right greater trochanteric bursa injection  2) Right sacroiliac joint injection                          3) Right piriformis trigger point injection    REASON FOR PROCEDURE:   Sacroiliitis [M46.1]  Greater trochanteric bursitis[M70.61]  Myalgia other site    PHYSICIAN: Ras Caballero MD  ASSISTANTS: None    SEDATION: Conscious sedation provided by M.D    The patient was monitored with continuous pulse oximetry, EKG, and intermittent blood pressure monitors.  The patient was hemodynamically stable throughout the entire process was responsive to voice, and breathing spontaneously.  Supplemental O2 was provided at 2L/min via nasal cannula.  Patient was comfortable for the duration of the procedure. (See nurse documentation and case log for sedation time)    There was a total of 2mg IV Midazolam and 50mcg Fentanyl titrated for the procedure        MEDICATIONS INJECTED: 1mL 40mg/ml Depo-Medrol and 4mL Bupivacaine 0.25% into each site    LOCAL ANESTHETIC USED: Xylocaine 1% 6ml     ESTIMATED BLOOD LOSS: None.   COMPLICATIONS: None.     TECHNIQUE:   Greater trochanteric bursa injection:  The area overlying the greater trochanteric bursa was identified using fluoroscopy, and the area overlying the skin was prepped and draped in usual sterile fashion. Local Xylocaine was injected by raising a wheel and going down to the periosteum using a 27-gauge hypodermic needle. A 5 inch 22-gauge spinal needle was introduce into the Right greater trochanteric bursa Negative pressure applied to confirm no intravascular placement. Omnipaque was injected to confirm placement and to confirm that there was no vascular runoff. The medication was then injected slowly.  Displacement of the contrast after injection of the medication confirmed that the medication went into the area of the greater trochanteric  "bursa    Sacroiliac joint injection:   Laying in the prone position, the patient was prepped and draped in the usual sterile fashion using ChloraPrep and fenestrated drape.  The area was determined under fluoroscopy.  Local Xylocaine was injected by raising a wheel and going down to the periosteum using a 27-gauge hypodermic needle.  The 3.5 inch 22-gauge spinal needle was introduce into the Right sacroiliac joint.  Negative pressure applied to confirm no intravascular placement.  Omnipaque was injected to confirm placement and to confirm that there was no vascular runoff.  The medication was then injected slowly.  The patient tolerated the procedure well.                       Piriformis trigger point injection:   The area overlying the right piriformis muscle was identified under fluoroscopy in the AP view, then 5 mL 1% lidocaine was injected into the subcutaneous tissue and deeper tissues over this area through a 25 gauge needle 1.5" needle. The right hip joint was visualized in an AP view. The tip of a 22-gauge 3 1/2 inch Quincke-type spinal needle was advanced 3 cm inferior and 3 cm lateral to the inferior aspect of the SI joint.  Once the piriformis muscle was thought to be encountered, 3 ml of Omnipaque 300 contrast agent was slowly injected. Confirmation of spread of contrast agent within the piriformis muscle was made in the AP fluoroscopic view. Subsequently,  4 ml of Bupivacaine 0.25% and 1mL of  40 mg/mL depomedrol was slowly administered. Displacement of the radio opaque contrast after injection of the medication confirmed that the medication went into the area of the piriformis muscle. The needle was removed and bleeding was nil.  A sterile dressing was applied. Marcie was taken back to the recovery room for further observation.       The patient was monitored for approximately 30 minutes after the procedure. Patient was given post procedure and discharge instructions to follow at home. We will see the " patient back in two weeks or the patient may call to inform of status. The patient was discharged in a stable condition

## 2022-10-17 DIAGNOSIS — D69.6 THROMBOCYTOPENIA: Primary | ICD-10-CM

## 2022-10-19 ENCOUNTER — LAB VISIT (OUTPATIENT)
Dept: LAB | Facility: HOSPITAL | Age: 57
End: 2022-10-19
Attending: INTERNAL MEDICINE
Payer: MEDICAID

## 2022-10-19 ENCOUNTER — OFFICE VISIT (OUTPATIENT)
Dept: HEMATOLOGY/ONCOLOGY | Facility: CLINIC | Age: 57
End: 2022-10-19
Payer: MEDICAID

## 2022-10-19 VITALS
HEIGHT: 65 IN | BODY MASS INDEX: 36.87 KG/M2 | SYSTOLIC BLOOD PRESSURE: 127 MMHG | TEMPERATURE: 98 F | HEART RATE: 81 BPM | WEIGHT: 221.31 LBS | OXYGEN SATURATION: 96 % | RESPIRATION RATE: 20 BRPM | DIASTOLIC BLOOD PRESSURE: 77 MMHG

## 2022-10-19 DIAGNOSIS — D69.6 THROMBOCYTOPENIA: ICD-10-CM

## 2022-10-19 DIAGNOSIS — Z94.4 LIVER REPLACED BY TRANSPLANT: Primary | ICD-10-CM

## 2022-10-19 LAB
ALBUMIN SERPL BCP-MCNC: 3.5 G/DL (ref 3.5–5.2)
ALP SERPL-CCNC: 170 U/L (ref 55–135)
ALT SERPL W/O P-5'-P-CCNC: 13 U/L (ref 10–44)
ANION GAP SERPL CALC-SCNC: 10 MMOL/L (ref 8–16)
AST SERPL-CCNC: 9 U/L (ref 10–40)
BASOPHILS # BLD AUTO: 0.04 K/UL (ref 0–0.2)
BASOPHILS NFR BLD: 0.3 % (ref 0–1.9)
BILIRUB SERPL-MCNC: 0.4 MG/DL (ref 0.1–1)
BUN SERPL-MCNC: 14 MG/DL (ref 6–20)
CALCIUM SERPL-MCNC: 9.2 MG/DL (ref 8.7–10.5)
CHLORIDE SERPL-SCNC: 106 MMOL/L (ref 95–110)
CO2 SERPL-SCNC: 25 MMOL/L (ref 23–29)
CREAT SERPL-MCNC: 0.9 MG/DL (ref 0.5–1.4)
DIFFERENTIAL METHOD: ABNORMAL
EOSINOPHIL # BLD AUTO: 0.4 K/UL (ref 0–0.5)
EOSINOPHIL NFR BLD: 2.8 % (ref 0–8)
ERYTHROCYTE [DISTWIDTH] IN BLOOD BY AUTOMATED COUNT: 14.6 % (ref 11.5–14.5)
EST. GFR  (NO RACE VARIABLE): >60 ML/MIN/1.73 M^2
GLUCOSE SERPL-MCNC: 86 MG/DL (ref 70–110)
HCT VFR BLD AUTO: 40.2 % (ref 37–48.5)
HGB BLD-MCNC: 12.8 G/DL (ref 12–16)
IMM GRANULOCYTES # BLD AUTO: 0.04 K/UL (ref 0–0.04)
IMM GRANULOCYTES NFR BLD AUTO: 0.3 % (ref 0–0.5)
LYMPHOCYTES # BLD AUTO: 3.1 K/UL (ref 1–4.8)
LYMPHOCYTES NFR BLD: 24 % (ref 18–48)
MCH RBC QN AUTO: 26.4 PG (ref 27–31)
MCHC RBC AUTO-ENTMCNC: 31.8 G/DL (ref 32–36)
MCV RBC AUTO: 83 FL (ref 82–98)
MONOCYTES # BLD AUTO: 0.8 K/UL (ref 0.3–1)
MONOCYTES NFR BLD: 6 % (ref 4–15)
NEUTROPHILS # BLD AUTO: 8.5 K/UL (ref 1.8–7.7)
NEUTROPHILS NFR BLD: 66.6 % (ref 38–73)
NRBC BLD-RTO: 0 /100 WBC
PLATELET # BLD AUTO: 268 K/UL (ref 150–450)
PMV BLD AUTO: 10 FL (ref 9.2–12.9)
POTASSIUM SERPL-SCNC: 4.4 MMOL/L (ref 3.5–5.1)
PROT SERPL-MCNC: 7.4 G/DL (ref 6–8.4)
RBC # BLD AUTO: 4.84 M/UL (ref 4–5.4)
SODIUM SERPL-SCNC: 141 MMOL/L (ref 136–145)
WBC # BLD AUTO: 12.7 K/UL (ref 3.9–12.7)

## 2022-10-19 PROCEDURE — 99213 PR OFFICE/OUTPT VISIT, EST, LEVL III, 20-29 MIN: ICD-10-PCS | Mod: S$PBB,,, | Performed by: INTERNAL MEDICINE

## 2022-10-19 PROCEDURE — 99999 PR PBB SHADOW E&M-EST. PATIENT-LVL IV: CPT | Mod: PBBFAC,,, | Performed by: INTERNAL MEDICINE

## 2022-10-19 PROCEDURE — 1160F RVW MEDS BY RX/DR IN RCRD: CPT | Mod: CPTII,,, | Performed by: INTERNAL MEDICINE

## 2022-10-19 PROCEDURE — 1159F PR MEDICATION LIST DOCUMENTED IN MEDICAL RECORD: ICD-10-PCS | Mod: CPTII,,, | Performed by: INTERNAL MEDICINE

## 2022-10-19 PROCEDURE — 3078F PR MOST RECENT DIASTOLIC BLOOD PRESSURE < 80 MM HG: ICD-10-PCS | Mod: CPTII,,, | Performed by: INTERNAL MEDICINE

## 2022-10-19 PROCEDURE — 3074F PR MOST RECENT SYSTOLIC BLOOD PRESSURE < 130 MM HG: ICD-10-PCS | Mod: CPTII,,, | Performed by: INTERNAL MEDICINE

## 2022-10-19 PROCEDURE — 3078F DIAST BP <80 MM HG: CPT | Mod: CPTII,,, | Performed by: INTERNAL MEDICINE

## 2022-10-19 PROCEDURE — 4010F PR ACE/ARB THEARPY RXD/TAKEN: ICD-10-PCS | Mod: CPTII,,, | Performed by: INTERNAL MEDICINE

## 2022-10-19 PROCEDURE — 99214 OFFICE O/P EST MOD 30 MIN: CPT | Mod: PBBFAC | Performed by: INTERNAL MEDICINE

## 2022-10-19 PROCEDURE — 80053 COMPREHEN METABOLIC PANEL: CPT | Performed by: INTERNAL MEDICINE

## 2022-10-19 PROCEDURE — 99999 PR PBB SHADOW E&M-EST. PATIENT-LVL IV: ICD-10-PCS | Mod: PBBFAC,,, | Performed by: INTERNAL MEDICINE

## 2022-10-19 PROCEDURE — 3074F SYST BP LT 130 MM HG: CPT | Mod: CPTII,,, | Performed by: INTERNAL MEDICINE

## 2022-10-19 PROCEDURE — 1159F MED LIST DOCD IN RCRD: CPT | Mod: CPTII,,, | Performed by: INTERNAL MEDICINE

## 2022-10-19 PROCEDURE — 4010F ACE/ARB THERAPY RXD/TAKEN: CPT | Mod: CPTII,,, | Performed by: INTERNAL MEDICINE

## 2022-10-19 PROCEDURE — 36415 COLL VENOUS BLD VENIPUNCTURE: CPT | Performed by: INTERNAL MEDICINE

## 2022-10-19 PROCEDURE — 85025 COMPLETE CBC W/AUTO DIFF WBC: CPT | Performed by: INTERNAL MEDICINE

## 2022-10-19 PROCEDURE — 1160F PR REVIEW ALL MEDS BY PRESCRIBER/CLIN PHARMACIST DOCUMENTED: ICD-10-PCS | Mod: CPTII,,, | Performed by: INTERNAL MEDICINE

## 2022-10-19 PROCEDURE — 99213 OFFICE O/P EST LOW 20 MIN: CPT | Mod: S$PBB,,, | Performed by: INTERNAL MEDICINE

## 2022-10-19 NOTE — ASSESSMENT & PLAN NOTE
Liver replaced by transplant [Z94.4]   Thrombocytopenia [D69.6]   Anemia of chronic disease [D63.8]

## 2022-10-19 NOTE — PROGRESS NOTES
Subjective:      DATE OF VISIT: 10/19/22     ?  Patient ID:?Marcie Bazan is a 57 y.o. female.?? MR#: [id number]   ?   REFERRING PROVIDER: No referring provider defined for this encounter.     ? Primary Care Providers:  KATJA Munoz MD, MD (General)     CHIEF COMPLAINT: Anemia and thrombocytopenia??????  ?   HPI     57 year-old female with history of arthritis, alcohol abuse with end-stage liver disease, hypertension, hyperlipidemia, questionable liver cancer status post liver transplant in 2017,    She comes in today for follow-up appointment because of intermittent thrombocytopenia and anemia.  She is trying to lose weight by trying different medication over-the-counter.  The transplant went very well and she is been doing well.    She denies history of any bruising bleeding no shortness of breath no chest pain nausea vomiting abdominal pain and diarrhea.  CT scan of the abdomen and pelvis showed atherosclerotic calcification of the abdominal aorta without aneurysmal dilatation, no acute abdominal abnormality.     PET-CT scan showed no suspicious foci of increased FDG avidity.  She was last seen in November, 2020.  Presents today for routine follow-up. C/o generalized malaise, nothing in particular.   Review of Systems    ?   A comprehensive 14-point review of systems was reviewed with patient and was negative other than as specified above.         PAST MEDICAL HISTORY:   Past Medical History:   Diagnosis Date    Alcohol abuse     Alcoholic hepatitis     Anemia of chronic disease     Arthritis     generialized    Cancer 12/26/2017    liver    Coagulopathy     Dyspnea on exertion 3/26/2020    Encounter for blood transfusion     End stage liver disease     History of alcohol abuse     Hyperlipidemia     Hypersomnia 9/11/2020    Hypertension     Hyponatremia     Liver cirrhosis     Liver transplant candidate 12/26/2017    Obesity (BMI 30-39.9) 1/5/2016    Sleep apnea     ?     PAST SURGICAL HISTORY:   Past  Surgical History:   Procedure Laterality Date    BILATERAL SALPINGO-OOPHORECTOMY (BSO) Bilateral 1986    with hysterectomy done for abnormal cells    BREAST BIOPSY Left     benign    BREAST LUMPECTOMY      patient is unsure of date or laterality    CHOLECYSTECTOMY      COLONOSCOPY N/A 12/01/2017    Procedure: COLONOSCOPY;  Surgeon: Filemon Fuentes MD;  Location: [facility] ENDO (2ND FLR);  Service: Endoscopy;  Laterality: N/A;    COLONOSCOPY N/A 12/05/2017    Procedure: COLONOSCOPY;  Surgeon: José Chavira MD;  Location: [facility] ENDO (2ND FLR);  Service: Endoscopy;  Laterality: N/A;    EPIDURAL STEROID INJECTION INTO CERVICAL SPINE N/A 01/04/2022    Procedure: C5/6 IL WILBERTO;  Surgeon: Ras Caballero MD;  Location: [facility] PAIN MGT;  Service: Pain Management;  Laterality: N/A;    EXAMINATION UNDER ANESTHESIA N/A 02/18/2020    Procedure: EXAM UNDER ANESTHESIA;  Surgeon: Alden Horowitz MD;  Location: [facility] OR;  Service: General;  Laterality: N/A;    EXCISIONAL HEMORRHOIDECTOMY N/A 02/18/2020    Procedure: HEMORRHOIDECTOMY;  Surgeon: Alden Horowitz MD;  Location: [facility] OR;  Service: General;  Laterality: N/A;    HYSTERECTOMY  1986    due to abnormal paps - ovaries were removed    INJECTION OF ANESTHETIC AGENT AROUND PUDENDAL NERVE N/A 02/18/2020    Procedure: BLOCK, NERVE, PUDENDAL;  Surgeon: Alden Horowitz MD;  Location: [facility] OR;  Service: General;  Laterality: N/A;    INJECTION OF ANESTHETIC AGENT INTO SACROILIAC JOINT Right 06/14/2019    Procedure: right Sacroiliac Joint Injection;  Surgeon: David Desai MD;  Location: [facility] PAIN MGT;  Service: Pain Management;  Laterality: Right;    INJECTION OF ANESTHETIC AGENT INTO SACROILIAC JOINT Bilateral 02/07/2020    Procedure: Bilateral GT bursa + Bilateral Sacroiliac Joint Injection;  Surgeon: David Desai MD;  Location: [facility] PAIN MGT;  Service: Pain Management;  Laterality: Bilateral;    INJECTION OF ANESTHETIC AGENT INTO SACROILIAC JOINT Bilateral 07/07/2020     Procedure: Bilateral GT bursa +SIJ injection;  Surgeon: David Desai MD;  Location: [facility] PAIN MGT;  Service: Pain Management;  Laterality: Bilateral;    INJECTION OF ANESTHETIC AGENT INTO SACROILIAC JOINT Right 10/21/2020    Procedure: Right piriformis + right SIJ + right GT bursa injection;  Surgeon: David Desai MD;  Location: [facility] PAIN MGT;  Service: Pain Management;  Laterality: Right;    INJECTION OF ANESTHETIC AGENT INTO SACROILIAC JOINT Bilateral 04/15/2021    Procedure: Bilateral SIJ + Bilateral Piriformis + Bilateral GT Bursa Injection;  Surgeon: Flaco Frazier MD;  Location: [facility] PAIN MGT;  Service: Pain Management;  Laterality: Bilateral;    INJECTION OF ANESTHETIC AGENT INTO SACROILIAC JOINT Bilateral 04/21/2022    Procedure: Bilateral SIJ + Bilateral Piriformis + Bilateral GT Bursa Injection;  Surgeon: Ras Caballero MD;  Location: [facility] PAIN MGT;  Service: Pain Management;  Laterality: Bilateral;    INJECTION OF JOINT Bilateral 02/07/2020    Procedure: Bilateral GT bursa + Bilateral Sacroiliac Joint Injection;  Surgeon: David Desai MD;  Location: [facility] PAIN MGT;  Service: Pain Management;  Laterality: Bilateral;    INJECTION OF JOINT Bilateral 07/07/2020    Procedure: Bilateral GT bursa +SIJ injection;  Surgeon: David Desai MD;  [facility];  Service: Pain Management;  Laterality: Bilateral;    INJECTION OF JOINT Right 10/21/2020    Procedure: Right piriformis + right SIJ + right GT bursa injection;  Surgeon: David Desai MD;  Location: [facility] PAIN [facility]GT;  Service: Pain Management;  Laterality: Right;    INJECTION OF JOINT Bilateral 02/08/2021    Procedure: Bilateral GT Bursa Injection;  Surgeon: Anna Kaminski MD;  [facility];  Service: Pain Management;  Laterality: Bilateral;    INJECTION OF JOINT Bilateral 04/15/2021    Procedure: Bilateral SIJ + Bilateral Piriformis + Bilateral GT Bursa Injection;  Surgeon: Flaco Frazier MD;  Location: [facility] PAIN MGT;   Service: Pain Management;  Laterality: Bilateral;    INJECTION OF JOINT Bilateral 04/21/2022    Procedure: Bilateral SIJ + Bilateral Piriformis + Bilateral GT Bursa Injection;  Surgeon: Ras Caballero MD;  [facility];  Service: Pain Management;  Laterality: Bilateral;    INJECTION OF JOINT Right 10/11/2022    Procedure: right SIJ + right piriformis + right GT bursa injection;  Surgeon: Ras Caballero MD;  Location: [facility]IN [facility]GT;  Service: Pain Management;  Laterality: Right;    INJECTION OF PIRIFORMIS MUSCLE Right 06/14/2019    Procedure: Right piriformis injection;  Surgeon: David Desai MD;  Location: [facility]GT;  Service: Pain Management;  Laterality: Right;    INJECTION OF PIRIFORMIS MUSCLE Right 10/21/2020    Procedure: Right piriformis + right SIJ + right GT bursa injection;  Surgeon: David Desai MD;  Location: [facility]GT;  Service: Pain Management;  Laterality: Right;    INJECTION OF PIRIFORMIS MUSCLE Bilateral 04/15/2021    Procedure: Bilateral SIJ + Bilateral Piriformis + Bilateral GT Bursa Injection;  Surgeon: Flaco Frazier MD;  Location: [facility]GT;  Service: Pain Management;  Laterality: Bilateral;    INJECTION OF PIRIFORMIS MUSCLE Bilateral 04/21/2022    Procedure: Bilateral SIJ + Bilateral Piriformis + Bilateral GT Bursa Injection;  Surgeon: Ras Caballero MD;  [facility];  Service: Pain Management;  Laterality: Bilateral;    LIVER TRANSPLANT  12/2017    SELECTIVE INJECTION OF ANESTHETIC AGENT AROUND LUMBAR SPINAL NERVE ROOT BY TRANSFORAMINAL APPROACH Bilateral 10/28/2021    Procedure: BLOCK, SPINAL NERVE ROOT, LUMBAR, SELECTIVE, TRANSFORAMINAL APPROACH bilateral L4-5 and Right Knee injection with RN IV sedation;  Surgeon: Flaco Frazier MD;  [facility];  Service: Pain Management;  Laterality: Bilateral;    SELECTIVE INJECTION OF ANESTHETIC AGENT AROUND LUMBAR SPINAL NERVE ROOT BY TRANSFORAMINAL APPROACH Bilateral 08/18/2022    Procedure:  Bilateral L4/5 TF WILBERTO;  Surgeon: Ras Caballero MD;  Location: [facility] PAIN MGT;  Service: Pain Management;  Laterality: Bilateral;      ?   ALLERGIES:   Allergies as of 10/19/2022 - Reviewed 10/19/2022   Allergen Reaction Noted    Ferrous sulfate Other (See Comments) 01/10/2017      ?   MEDICATIONS:?   Outpatient Medications Marked as Taking for the 10/19/22 encounter (Office Visit) with Tessa Mata MD   Medication Sig Dispense Refill    albuterol (VENTOLIN HFA) 90 mcg/actuation inhaler Inhale 2 puffs into the lungs every 6 (six) hours as needed for Wheezing. Rescue 18 g 0    amitriptyline (ELAVIL) 25 MG tablet Take 1-2 tablets (25-50 mg total) by mouth nightly as needed for Insomnia or Pain. 60 tablet 1    atorvastatin (LIPITOR) 40 MG tablet Take 1 tablet (40 mg total) by mouth every evening. 90 tablet 2    cyclobenzaprine (FLEXERIL) 10 MG tablet TAKE 1/2 TO 1 TABLET BY MOUTH THREE TIMES DAILY AS NEEDED FOR MUSCLE SPASMS 90 tablet 1    ergocalciferol (ERGOCALCIFEROL) 50,000 unit Cap Take 1 capsule (50,000 Units total) by mouth every 7 days. 12 capsule 3    famotidine (PEPCID) 20 MG tablet Take 1 tablet (20 mg total) by mouth 2 (two) times a day. 90 tablet 3    furosemide (LASIX) 40 MG tablet TAKE 1 TABLET BY MOUTH EVERY DAY 30 tablet 5    losartan (COZAAR) 25 MG tablet Take 1 tablet (25 mg total) by mouth once daily. 90 tablet 3    magnesium oxide (MAG-OX) 400 mg (241.3 mg magnesium) tablet Take 1 tablet (400 mg total) by mouth 2 (two) times daily. 60 tablet 2    NIFEdipine (PROCARDIA-XL) 60 MG (OSM) 24 hr tablet TAKE 1 TABLET(60 MG) BY MOUTH EVERY EVENING 90 tablet 3    pantoprazole (PROTONIX) 40 MG tablet Take 1 tablet (40 mg total) by mouth once daily. 90 tablet 3    potassium chloride (KLOR-CON) 10 MEQ TbSR Take 1 tablet (10 mEq total) by mouth 2 (two) times daily. 180 tablet 2    pregabalin (LYRICA) 225 MG Cap Take 1 capsule (225 mg total) by mouth 2 (two) times daily. 60 capsule 1    tacrolimus (PROGRAF)  1 MG Cap TAKE 2 CAPSULES BY MOUTH EVERY MORNING AND 1 CAPSULE EVERY EVENING 90 capsule 11      ?   SOCIAL HISTORY:?   Social History     Tobacco Use    Smoking status: Never    Smokeless tobacco: Never   Substance Use Topics    Alcohol use: No     Comment: Currently admitted to inpatient rehab 7/2017      ?   ?   FAMILY HISTORY:   family history includes Alzheimer's disease in her mother; Depression in her maternal grandfather; Diabetes in her father and sister; Hypertension in her father and mother; Ovarian cancer in her paternal aunt.   ?       Objective:      Physical Exam      ?   Vitals:    10/19/22 0839   BP: 127/77   Pulse: 81   Resp: 20   Temp: 97.7 °F (36.5 °C)      ECOG:?0  General appearance: Generally well appearing, in no acute distress.   Head, eyes, ears, nose, and throat: moist mucous membranes.   Respiratory:  Normal work of breathing  Abdomen: nontender, nondistended.   Extremities: Warm, without edema.   Neurologic: Alert and oriented.   Skin: No rashes, ecchymoses or petechial lesion.   Psychiatric:  Normal mood and affect.    ?   Laboratory:    Lab Results   Component Value Date    WBC 12.70 10/19/2022    RBC 4.84 10/19/2022    HGB 12.8 10/19/2022    HCT 40.2 10/19/2022    MCV 83 10/19/2022    MCH 26.4 (L) 10/19/2022    MCHC 31.8 (L) 10/19/2022    RDW 14.6 (H) 10/19/2022     10/19/2022    MPV 10.0 10/19/2022    GRAN 8.5 (H) 10/19/2022    GRAN 66.6 10/19/2022    LYMPH 3.1 10/19/2022    LYMPH 24.0 10/19/2022    MONO 0.8 10/19/2022    MONO 6.0 10/19/2022    EOS 0.4 10/19/2022    BASO 0.04 10/19/2022    EOSINOPHIL 2.8 10/19/2022    BASOPHIL 0.3 10/19/2022     Component Ref Range & Units 08:27 4 wk ago 2 mo ago 4 mo ago 6 mo ago 9 mo ago 11 mo ago   WBC 3.90 - 12.70 K/uL 12.70  8.70  9.69  9.16  9.50  12.59  12.32    RBC 4.00 - 5.40 M/uL 4.84  4.69  4.66  4.78  5.08  4.66  4.79    Hemoglobin 12.0 - 16.0 g/dL 12.8  12.1  12.3  12.6  13.3  12.9  12.7    Hematocrit 37.0 - 48.5 % 40.2  41.4  39.1   41.1  43.5  40.6  40.5    MCV 82 - 98 fL 83  88  84  86  86  87  85    MCH 27.0 - 31.0 pg 26.4 Low   25.8 Low   26.4 Low   26.4 Low   26.2 Low   27.7  26.5 Low     MCHC 32.0 - 36.0 g/dL 31.8 Low   29.2 Low   31.5 Low   30.7 Low   30.6 Low   31.8 Low   31.4 Low     RDW 11.5 - 14.5 % 14.6 High   14.8 High   13.9  14.5  14.6 High   13.7  15.5 High     Platelets 150 - 450 K/uL 268  92 Low   249  73 Low   98 Low   87 Low   288    MPV 9.2 - 12.9 fL 10.0  10.5  9.7  11.4  10.2  11.3  9.5    Immature Granulocytes 0.0 - 0.5 % 0.3  0.2  0.3  0.2  0.2  1.0 High   0.3    Gran # (ANC) 1.8 - 7.7 K/uL 8.5 High   5.6  6.6  6.2  6.6  8.9 High   8.9 High     Immature Grans (Abs) 0.00 - 0.04 K/uL 0.04  0.02 CM  0.03 CM  0.02 CM  0.02 CM  0.12 High  CM  0.04 CM    Comment: Mild elevation in immature granulocytes is non specific and   can be seen in a variety of conditions including stress response,   acute inflammation, trauma and pregnancy. Correlation with other        Sodium   Date Value Ref Range Status   10/19/2022 141 136 - 145 mmol/L Final     Potassium   Date Value Ref Range Status   10/19/2022 4.4 3.5 - 5.1 mmol/L Final     Chloride   Date Value Ref Range Status   10/19/2022 106 95 - 110 mmol/L Final     CO2   Date Value Ref Range Status   10/19/2022 25 23 - 29 mmol/L Final     Glucose   Date Value Ref Range Status   10/19/2022 86 70 - 110 mg/dL Final     BUN   Date Value Ref Range Status   10/19/2022 14 6 - 20 mg/dL Final     Creatinine   Date Value Ref Range Status   10/19/2022 0.9 0.5 - 1.4 mg/dL Final     Calcium   Date Value Ref Range Status   10/19/2022 9.2 8.7 - 10.5 mg/dL Final     Total Protein   Date Value Ref Range Status   10/19/2022 7.4 6.0 - 8.4 g/dL Final     Albumin   Date Value Ref Range Status   10/19/2022 3.5 3.5 - 5.2 g/dL Final     Total Bilirubin   Date Value Ref Range Status   10/19/2022 0.4 0.1 - 1.0 mg/dL Final     Comment:     For infants and newborns, interpretation of results should be based  on  gestational age, weight and in agreement with clinical  observations.    Premature Infant recommended reference ranges:  Up to 24 hours.............<8.0 mg/dL  Up to 48 hours............<12.0 mg/dL  3-5 days..................<15.0 mg/dL  6-29 days.................<15.0 mg/dL       Alkaline Phosphatase   Date Value Ref Range Status   10/19/2022 170 (H) 55 - 135 U/L Final     AST   Date Value Ref Range Status   10/19/2022 9 (L) 10 - 40 U/L Final     ALT   Date Value Ref Range Status   10/19/2022 13 10 - 44 U/L Final     Anion Gap   Date Value Ref Range Status   10/19/2022 10 8 - 16 mmol/L Final     eGFR if    Date Value Ref Range Status   07/26/2022 >60 >60 mL/min/1.73 m^2 Final     eGFR if non    Date Value Ref Range Status   07/26/2022 >60 >60 mL/min/1.73 m^2 Final     Comment:     Calculation used to obtain the estimated glomerular filtration  rate (eGFR) is the CKD-EPI equation.          Iron   Date Value Ref Range Status   11/10/2021 67 30 - 160 ug/dL Final     TIBC   Date Value Ref Range Status   11/10/2021 317 250 - 450 ug/dL Final     Saturated Iron   Date Value Ref Range Status   11/10/2021 21 20 - 50 % Final     Ferritin   Date Value Ref Range Status   11/10/2021 219 20.0 - 300.0 ng/mL Final     Folate   Date Value Ref Range Status   05/04/2021 11.6 4.0 - 24.0 ng/mL Final     TSH   Date Value Ref Range Status   05/04/2021 1.767 0.400 - 4.000 uIU/mL Final     Specimen (4380h ago, onward)      None           No images are attached to the encounter.   Oncology History    No history exists.    Thrombocytopenia  Patient clone blood count is back to normal with several discharge the patient to capable hands of primary care physician number glad to see her if need arises in future.  I discussed with the patient in detail all her labs and told her that the fluctuation in her blood count is probably related to either tacrolimus or some other medication she has been using for weight  loss.  At this point I do not feel that any diagnostic or therapeutic intervention is necessary.           Follow-Up:   Follow up if symptoms worsen or fail to improve.     Tessa Mata MD

## 2022-10-20 ENCOUNTER — OFFICE VISIT (OUTPATIENT)
Dept: OBSTETRICS AND GYNECOLOGY | Facility: CLINIC | Age: 57
End: 2022-10-20
Payer: MEDICAID

## 2022-10-20 VITALS — BODY MASS INDEX: 37.18 KG/M2 | HEIGHT: 65 IN | WEIGHT: 223.13 LBS

## 2022-10-20 DIAGNOSIS — R87.622 VAGINAL PAP SMEAR WITH LGSIL: Primary | ICD-10-CM

## 2022-10-20 PROCEDURE — 4010F PR ACE/ARB THEARPY RXD/TAKEN: ICD-10-PCS | Mod: CPTII,,, | Performed by: NURSE PRACTITIONER

## 2022-10-20 PROCEDURE — 1159F MED LIST DOCD IN RCRD: CPT | Mod: CPTII,,, | Performed by: NURSE PRACTITIONER

## 2022-10-20 PROCEDURE — 1160F RVW MEDS BY RX/DR IN RCRD: CPT | Mod: CPTII,,, | Performed by: NURSE PRACTITIONER

## 2022-10-20 PROCEDURE — 99212 OFFICE O/P EST SF 10 MIN: CPT | Mod: S$PBB,,, | Performed by: NURSE PRACTITIONER

## 2022-10-20 PROCEDURE — 99212 PR OFFICE/OUTPT VISIT, EST, LEVL II, 10-19 MIN: ICD-10-PCS | Mod: S$PBB,,, | Performed by: NURSE PRACTITIONER

## 2022-10-20 PROCEDURE — 1159F PR MEDICATION LIST DOCUMENTED IN MEDICAL RECORD: ICD-10-PCS | Mod: CPTII,,, | Performed by: NURSE PRACTITIONER

## 2022-10-20 PROCEDURE — 99213 OFFICE O/P EST LOW 20 MIN: CPT | Mod: PBBFAC | Performed by: NURSE PRACTITIONER

## 2022-10-20 PROCEDURE — 99999 PR PBB SHADOW E&M-EST. PATIENT-LVL III: CPT | Mod: PBBFAC,,, | Performed by: NURSE PRACTITIONER

## 2022-10-20 PROCEDURE — 1160F PR REVIEW ALL MEDS BY PRESCRIBER/CLIN PHARMACIST DOCUMENTED: ICD-10-PCS | Mod: CPTII,,, | Performed by: NURSE PRACTITIONER

## 2022-10-20 PROCEDURE — 99999 PR PBB SHADOW E&M-EST. PATIENT-LVL III: ICD-10-PCS | Mod: PBBFAC,,, | Performed by: NURSE PRACTITIONER

## 2022-10-20 PROCEDURE — 4010F ACE/ARB THERAPY RXD/TAKEN: CPT | Mod: CPTII,,, | Performed by: NURSE PRACTITIONER

## 2022-10-20 NOTE — PROGRESS NOTES
Subjective:       Patient ID: Marcie Bazan is a 57 y.o. female.    Chief Complaint:  Abnormal Pap Smear    Patient's last menstrual period was 1985 (approximate).  History of Present Illness  Presents today for vaginoscopy counseling   Client had a hysterectomy 36 years ago for cancerous cells seen on her cervix  22 pap smear results were LSIL   This patient is 57 years old and was referred to me for a vaginoscopy due to LSIL vaginal pap.   She has been going to [facility] and states that her vaginal pap smear are abnormal every year and every year she gets a vaginoscopy.   OB History    Para Term  AB Living   2 2 2         SAB IAB Ectopic Multiple Live Births                  # Outcome Date GA Lbr [place]/2nd Weight Sex Delivery Anes PTL Lv   2 Term            1 Term                Review of Systems  Review of Systems        Objective:    Physical Exam      Assessment:     1. Vaginal Pap smear with LGSIL              Plan:   Marcie was seen today for abnormal pap smear.    Diagnoses and all orders for this visit:    Vaginal Pap smear with LGSIL      Vaginoscopy has been scheduled

## 2022-10-21 ENCOUNTER — TELEPHONE (OUTPATIENT)
Dept: OBSTETRICS AND GYNECOLOGY | Facility: CLINIC | Age: 57
End: 2022-10-21
Payer: MEDICAID

## 2022-10-21 NOTE — TELEPHONE ENCOUNTER
----- Message from Lynn Chauhan NP sent at 10/20/2022  5:31 PM CDT -----  Please let Ms. Jack know that we will still need to proceed with her vaginoscopy as scheduled.

## 2022-10-24 ENCOUNTER — PROCEDURE VISIT (OUTPATIENT)
Dept: OBSTETRICS AND GYNECOLOGY | Facility: CLINIC | Age: 57
End: 2022-10-24
Payer: MEDICAID

## 2022-10-24 VITALS
HEIGHT: 65 IN | SYSTOLIC BLOOD PRESSURE: 130 MMHG | DIASTOLIC BLOOD PRESSURE: 86 MMHG | BODY MASS INDEX: 36.91 KG/M2 | WEIGHT: 221.56 LBS

## 2022-10-24 DIAGNOSIS — R87.612 LGSIL ON PAP SMEAR OF CERVIX: Primary | ICD-10-CM

## 2022-10-24 PROCEDURE — 57452 EXAM OF CERVIX W/SCOPE: CPT | Mod: S$PBB,,, | Performed by: NURSE PRACTITIONER

## 2022-10-24 PROCEDURE — 57452 COLPOSCOPY: ICD-10-PCS | Mod: S$PBB,,, | Performed by: NURSE PRACTITIONER

## 2022-10-24 PROCEDURE — 57452 EXAM OF CERVIX W/SCOPE: CPT | Mod: PBBFAC | Performed by: NURSE PRACTITIONER

## 2022-10-24 NOTE — PROCEDURES
Colposcopy    Date/Time: 10/24/2022 8:30 AM  Performed by: Lynn Chauhan NP  Authorized by: Lynn Chauhan NP     Consent Done?:  Yes (Verbal) and Yes (Written)  Timeout:Immediately prior to procedure a time out was called to verify the correct patient, procedure, equipment, support staff and site/side marked as required  Prep:Patient was prepped and draped in the usual sterile fashion  Assistants?: Yes    List of assistants:  Bisi Ness I was present for the entire procedure.    Colposcopy Site:  Cervix  Position:  Supine   Patient was prepped and draped in the normal sterile fashion.  Acrowhite Lesion: No    Atypical Vessels: No    Transformation Zone Adequate?: No    Biopsy?: No    ECC Performed?: No    LEEP Performed?: No    Estimated blood loss (cc):  0   Patient tolerated the procedure well with no immediate complications.   Post-operative instructions were provided for the patient.   Patient was discharged and will follow up if any complications occur     Vaginoscopy negative

## 2022-10-31 ENCOUNTER — TELEPHONE (OUTPATIENT)
Dept: OBSTETRICS AND GYNECOLOGY | Facility: CLINIC | Age: 57
End: 2022-10-31
Payer: MEDICAID

## 2022-10-31 DIAGNOSIS — N95.1 VAGINAL DRYNESS, MENOPAUSAL: Primary | ICD-10-CM

## 2022-10-31 RX ORDER — ESTRADIOL 0.1 MG/G
CREAM VAGINAL
Qty: 42.5 G | Refills: 6 | Status: SHIPPED | OUTPATIENT
Start: 2022-10-31 | End: 2023-07-13

## 2022-10-31 NOTE — TELEPHONE ENCOUNTER
Spoke with client   2 patient identifiers verified   Dr. Johnson recommended client use estrace cream for vaginal dryness, sometimes pap smear results are abnormal due to vaginal atrophy. Client verbalizes understanding to use 14 night and then twice a week and then return to clinic for repeat pap in 6 months. Repeat pap scheduled 5/17/2022 at 0730 am

## 2022-11-01 ENCOUNTER — HOSPITAL ENCOUNTER (OUTPATIENT)
Dept: PREADMISSION TESTING | Facility: HOSPITAL | Age: 57
Discharge: HOME | End: 2022-11-01
Payer: MEDICAID

## 2022-11-01 ENCOUNTER — OFFICE VISIT (OUTPATIENT)
Dept: OPHTHALMOLOGY | Facility: CLINIC | Age: 57
End: 2022-11-01
Payer: MEDICAID

## 2022-11-01 DIAGNOSIS — K59.09 OTHER CONSTIPATION: ICD-10-CM

## 2022-11-01 DIAGNOSIS — H25.013 CORTICAL AGE-RELATED CATARACT OF BOTH EYES: ICD-10-CM

## 2022-11-01 DIAGNOSIS — D64.9 ANEMIA, UNSPECIFIED TYPE: Primary | ICD-10-CM

## 2022-11-01 DIAGNOSIS — H52.213 IRREGULAR ASTIGMATISM OF BOTH EYES: Primary | ICD-10-CM

## 2022-11-01 PROCEDURE — 99213 OFFICE O/P EST LOW 20 MIN: CPT | Mod: PBBFAC | Performed by: OPTOMETRIST

## 2022-11-01 PROCEDURE — 99999 PR PBB SHADOW E&M-EST. PATIENT-LVL III: CPT | Mod: PBBFAC,,, | Performed by: OPTOMETRIST

## 2022-11-01 PROCEDURE — 1159F PR MEDICATION LIST DOCUMENTED IN MEDICAL RECORD: ICD-10-PCS | Mod: CPTII,,, | Performed by: OPTOMETRIST

## 2022-11-01 PROCEDURE — 99999 PR PBB SHADOW E&M-EST. PATIENT-LVL III: ICD-10-PCS | Mod: PBBFAC,,, | Performed by: OPTOMETRIST

## 2022-11-01 PROCEDURE — 92014 COMPRE OPH EXAM EST PT 1/>: CPT | Mod: S$PBB,,, | Performed by: OPTOMETRIST

## 2022-11-01 PROCEDURE — 4010F PR ACE/ARB THEARPY RXD/TAKEN: ICD-10-PCS | Mod: CPTII,,, | Performed by: OPTOMETRIST

## 2022-11-01 PROCEDURE — 1159F MED LIST DOCD IN RCRD: CPT | Mod: CPTII,,, | Performed by: OPTOMETRIST

## 2022-11-01 PROCEDURE — 92025 CPTRIZED CORNEAL TOPOGRAPHY: CPT | Mod: PBBFAC | Performed by: OPTOMETRIST

## 2022-11-01 PROCEDURE — 4010F ACE/ARB THERAPY RXD/TAKEN: CPT | Mod: CPTII,,, | Performed by: OPTOMETRIST

## 2022-11-01 PROCEDURE — 92025 CORNEAL TOPOGRAPHY - OU - BOTH EYES: ICD-10-PCS | Mod: 26,S$PBB,, | Performed by: OPTOMETRIST

## 2022-11-01 PROCEDURE — 92014 PR EYE EXAM, EST PATIENT,COMPREHESV: ICD-10-PCS | Mod: S$PBB,,, | Performed by: OPTOMETRIST

## 2022-11-01 PROCEDURE — 1160F PR REVIEW ALL MEDS BY PRESCRIBER/CLIN PHARMACIST DOCUMENTED: ICD-10-PCS | Mod: CPTII,,, | Performed by: OPTOMETRIST

## 2022-11-01 PROCEDURE — 1160F RVW MEDS BY RX/DR IN RCRD: CPT | Mod: CPTII,,, | Performed by: OPTOMETRIST

## 2022-11-01 RX ORDER — POLYETHYLENE GLYCOL 3350, SODIUM SULFATE ANHYDROUS, SODIUM BICARBONATE, SODIUM CHLORIDE, POTASSIUM CHLORIDE 236; 22.74; 6.74; 5.86; 2.97 G/4L; G/4L; G/4L; G/4L; G/4L
4 POWDER, FOR SOLUTION ORAL ONCE
Qty: 4000 ML | Refills: 0 | Status: SHIPPED | OUTPATIENT
Start: 2022-11-01 | End: 2022-11-01

## 2022-11-01 NOTE — PROGRESS NOTES
HPI     Blurred Vision            Comments: Pt states having big vision changes and straining. Pt reports   having to be very close to TV to see. Pt states having pain in OS and   watering OU mostly OS.          Last edited by Caroline Correia MA on 11/1/2022  2:05 PM.            Assessment /Plan     For exam results, see Encounter Report.    Irregular astigmatism of both eyes    Decreased BCVA with refraction  Significant changes in emily today compared to 2020 scan, see below            2020 emily:          2022 emily:      Irreg astigmatism vs early Keratoconus  Consult Dr Hwang   Discussed possible RGP fit after corneal consult    Stable mOCT today OU    RTC next available with Dr. Hwang for corneal consult or PRN  Discussed above and all questions were answered.

## 2022-11-01 NOTE — Clinical Note
Good morning, Please contact pt and schedule consult with Dr Hwang for irregular astigmatism vs keratoconus, possible cross linking. Thanks!

## 2022-11-03 ENCOUNTER — TELEPHONE (OUTPATIENT)
Dept: OPHTHALMOLOGY | Facility: CLINIC | Age: 57
End: 2022-11-03
Payer: MEDICAID

## 2022-11-03 NOTE — TELEPHONE ENCOUNTER
----- Message from Ronald Aldana OD sent at 11/2/2022  8:24 AM CDT -----  Good morning, Please contact pt and schedule consult with Dr Hwang for irregular astigmatism vs keratoconus, possible cross linking. Thanks!

## 2022-11-09 ENCOUNTER — OFFICE VISIT (OUTPATIENT)
Dept: PAIN MEDICINE | Facility: CLINIC | Age: 57
End: 2022-11-09
Payer: MEDICAID

## 2022-11-09 VITALS
SYSTOLIC BLOOD PRESSURE: 126 MMHG | DIASTOLIC BLOOD PRESSURE: 73 MMHG | BODY MASS INDEX: 36.87 KG/M2 | WEIGHT: 221.31 LBS | HEIGHT: 65 IN | HEART RATE: 84 BPM

## 2022-11-09 DIAGNOSIS — M54.16 BILATERAL LUMBAR RADICULOPATHY: ICD-10-CM

## 2022-11-09 DIAGNOSIS — R52 GENERALIZED PAIN: Primary | ICD-10-CM

## 2022-11-09 DIAGNOSIS — M46.1 SACROILIITIS: ICD-10-CM

## 2022-11-09 PROCEDURE — 1160F PR REVIEW ALL MEDS BY PRESCRIBER/CLIN PHARMACIST DOCUMENTED: ICD-10-PCS | Mod: CPTII,,, | Performed by: PHYSICIAN ASSISTANT

## 2022-11-09 PROCEDURE — 99213 OFFICE O/P EST LOW 20 MIN: CPT | Mod: PBBFAC | Performed by: PHYSICIAN ASSISTANT

## 2022-11-09 PROCEDURE — 1159F PR MEDICATION LIST DOCUMENTED IN MEDICAL RECORD: ICD-10-PCS | Mod: CPTII,,, | Performed by: PHYSICIAN ASSISTANT

## 2022-11-09 PROCEDURE — 99214 OFFICE O/P EST MOD 30 MIN: CPT | Mod: S$PBB,,, | Performed by: PHYSICIAN ASSISTANT

## 2022-11-09 PROCEDURE — 4010F ACE/ARB THERAPY RXD/TAKEN: CPT | Mod: CPTII,,, | Performed by: PHYSICIAN ASSISTANT

## 2022-11-09 PROCEDURE — 3074F PR MOST RECENT SYSTOLIC BLOOD PRESSURE < 130 MM HG: ICD-10-PCS | Mod: CPTII,,, | Performed by: PHYSICIAN ASSISTANT

## 2022-11-09 PROCEDURE — 1159F MED LIST DOCD IN RCRD: CPT | Mod: CPTII,,, | Performed by: PHYSICIAN ASSISTANT

## 2022-11-09 PROCEDURE — 99999 PR PBB SHADOW E&M-EST. PATIENT-LVL III: ICD-10-PCS | Mod: PBBFAC,,, | Performed by: PHYSICIAN ASSISTANT

## 2022-11-09 PROCEDURE — 3074F SYST BP LT 130 MM HG: CPT | Mod: CPTII,,, | Performed by: PHYSICIAN ASSISTANT

## 2022-11-09 PROCEDURE — 99214 PR OFFICE/OUTPT VISIT, EST, LEVL IV, 30-39 MIN: ICD-10-PCS | Mod: S$PBB,,, | Performed by: PHYSICIAN ASSISTANT

## 2022-11-09 PROCEDURE — 3008F BODY MASS INDEX DOCD: CPT | Mod: CPTII,,, | Performed by: PHYSICIAN ASSISTANT

## 2022-11-09 PROCEDURE — 1160F RVW MEDS BY RX/DR IN RCRD: CPT | Mod: CPTII,,, | Performed by: PHYSICIAN ASSISTANT

## 2022-11-09 PROCEDURE — 4010F PR ACE/ARB THEARPY RXD/TAKEN: ICD-10-PCS | Mod: CPTII,,, | Performed by: PHYSICIAN ASSISTANT

## 2022-11-09 PROCEDURE — 3008F PR BODY MASS INDEX (BMI) DOCUMENTED: ICD-10-PCS | Mod: CPTII,,, | Performed by: PHYSICIAN ASSISTANT

## 2022-11-09 PROCEDURE — 3078F PR MOST RECENT DIASTOLIC BLOOD PRESSURE < 80 MM HG: ICD-10-PCS | Mod: CPTII,,, | Performed by: PHYSICIAN ASSISTANT

## 2022-11-09 PROCEDURE — 99999 PR PBB SHADOW E&M-EST. PATIENT-LVL III: CPT | Mod: PBBFAC,,, | Performed by: PHYSICIAN ASSISTANT

## 2022-11-09 PROCEDURE — 3078F DIAST BP <80 MM HG: CPT | Mod: CPTII,,, | Performed by: PHYSICIAN ASSISTANT

## 2022-11-09 RX ORDER — PREGABALIN 225 MG/1
225 CAPSULE ORAL 2 TIMES DAILY
Qty: 60 CAPSULE | Refills: 0 | Status: SHIPPED | OUTPATIENT
Start: 2022-11-09 | End: 2022-12-12
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
## 2022-11-16 ENCOUNTER — PATIENT MESSAGE (OUTPATIENT)
Dept: PAIN MEDICINE | Facility: CLINIC | Age: 57
End: 2022-11-16
Payer: MEDICAID

## 2022-11-16 NOTE — ED NOTES
Pt. Resting in bed. No acute distress, RR equal and non labored, VSS. Bed in low, locked, and call light in reach. Side rails up X 2. Will continue to monitor.     Clear

## 2022-11-17 ENCOUNTER — PATIENT MESSAGE (OUTPATIENT)
Dept: INTERNAL MEDICINE | Facility: CLINIC | Age: 57
End: 2022-11-17
Payer: MEDICAID

## 2022-11-23 DIAGNOSIS — E83.42 HYPOMAGNESEMIA: ICD-10-CM

## 2022-11-23 RX ORDER — LANOLIN ALCOHOL/MO/W.PET/CERES
400 CREAM (GRAM) TOPICAL 2 TIMES DAILY
Qty: 60 TABLET | Refills: 2 | Status: SHIPPED | OUTPATIENT
Start: 2022-11-23 | End: 2023-03-09

## 2022-12-01 ENCOUNTER — PATIENT MESSAGE (OUTPATIENT)
Dept: TRANSPLANT | Facility: CLINIC | Age: 57
End: 2022-12-01
Payer: MEDICAID

## 2022-12-01 ENCOUNTER — PATIENT MESSAGE (OUTPATIENT)
Dept: PAIN MEDICINE | Facility: CLINIC | Age: 57
End: 2022-12-01
Payer: MEDICAID

## 2022-12-01 DIAGNOSIS — E83.42 HYPOMAGNESEMIA: ICD-10-CM

## 2022-12-01 NOTE — TELEPHONE ENCOUNTER
Patient made aware of Mag Ox dose decrease down to just a daily dose.    Encouraged to eat foods rich in magnesium, examples given.      ----- Message from Joselin Souza MD sent at 11/30/2022  4:28 PM CST -----  Regarding: RE: mag script  Daily dosing  ----- Message -----  From: Viola Maynard RN  Sent: 11/21/2022   2:52 PM CST  To: Joselin Souza MD  Subject: mag script                                       Her pharmacy is asking for mag refill. The last you wrote, you were wanting to try and her daily dosage down. The last two labs were a normal level. Would you like the script resent the way it is BID or would you like me to drop her to daily?

## 2022-12-02 RX ORDER — LANOLIN ALCOHOL/MO/W.PET/CERES
400 CREAM (GRAM) TOPICAL DAILY
Qty: 90 TABLET | Refills: 3 | OUTPATIENT
Start: 2022-12-02 | End: 2023-12-02

## 2022-12-02 NOTE — TELEPHONE ENCOUNTER
Called to schedule Ms. Marcie Bazan a virtual appointment with Emilee Buitrago PA-C. All questions answered.

## 2022-12-06 ENCOUNTER — ANESTHESIA EVENT (OUTPATIENT)
Dept: ENDOSCOPY | Facility: HOSPITAL | Age: 57
End: 2022-12-06
Payer: MEDICAID
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
## 2022-12-07 ENCOUNTER — OFFICE VISIT (OUTPATIENT)
Dept: PAIN MEDICINE | Facility: CLINIC | Age: 57
End: 2022-12-07
Payer: MEDICAID

## 2022-12-07 DIAGNOSIS — G89.4 CHRONIC PAIN SYNDROME: ICD-10-CM

## 2022-12-07 DIAGNOSIS — R52 GENERALIZED PAIN: Primary | ICD-10-CM

## 2022-12-07 DIAGNOSIS — M54.16 BILATERAL LUMBAR RADICULOPATHY: ICD-10-CM

## 2022-12-07 DIAGNOSIS — M47.818 ARTHROPATHY OF RIGHT SACROILIAC JOINT: ICD-10-CM

## 2022-12-07 DIAGNOSIS — M46.1 SACROILIITIS: ICD-10-CM

## 2022-12-07 PROCEDURE — 1160F PR REVIEW ALL MEDS BY PRESCRIBER/CLIN PHARMACIST DOCUMENTED: ICD-10-PCS | Mod: CPTII,95,, | Performed by: PHYSICIAN ASSISTANT

## 2022-12-07 PROCEDURE — 99214 OFFICE O/P EST MOD 30 MIN: CPT | Mod: 95,,, | Performed by: PHYSICIAN ASSISTANT

## 2022-12-07 PROCEDURE — 4010F ACE/ARB THERAPY RXD/TAKEN: CPT | Mod: CPTII,95,, | Performed by: PHYSICIAN ASSISTANT

## 2022-12-07 PROCEDURE — 1159F PR MEDICATION LIST DOCUMENTED IN MEDICAL RECORD: ICD-10-PCS | Mod: CPTII,95,, | Performed by: PHYSICIAN ASSISTANT

## 2022-12-07 PROCEDURE — 99214 PR OFFICE/OUTPT VISIT, EST, LEVL IV, 30-39 MIN: ICD-10-PCS | Mod: 95,,, | Performed by: PHYSICIAN ASSISTANT

## 2022-12-07 PROCEDURE — 4010F PR ACE/ARB THEARPY RXD/TAKEN: ICD-10-PCS | Mod: CPTII,95,, | Performed by: PHYSICIAN ASSISTANT

## 2022-12-07 PROCEDURE — 1160F RVW MEDS BY RX/DR IN RCRD: CPT | Mod: CPTII,95,, | Performed by: PHYSICIAN ASSISTANT

## 2022-12-07 PROCEDURE — 1159F MED LIST DOCD IN RCRD: CPT | Mod: CPTII,95,, | Performed by: PHYSICIAN ASSISTANT

## 2022-12-07 RX ORDER — DULOXETIN HYDROCHLORIDE 20 MG/1
20 CAPSULE, DELAYED RELEASE ORAL DAILY
Qty: 30 CAPSULE | Refills: 1 | Status: SHIPPED | OUTPATIENT
Start: 2022-12-07 | End: 2023-01-31 | Stop reason: SDUPTHER

## 2022-12-07 NOTE — ANESTHESIA PREPROCEDURE EVALUATION
12/07/2022  Marcie Bazan is a 57 y.o., female.  Past Medical History:   Diagnosis Date    Alcohol abuse     Alcoholic hepatitis     Anemia of chronic disease     Arthritis     generialized    Cancer 12/26/2017    liver    Coagulopathy     Dyspnea on exertion 3/26/2020    Encounter for blood transfusion     End stage liver disease     History of alcohol abuse     Hyperlipidemia     Hypersomnia 9/11/2020    Hypertension     Hyponatremia     Liver cirrhosis     Liver transplant candidate 12/26/2017    Obesity (BMI 30-39.9) 1/5/2016    Sleep apnea      Past Surgical History:   Procedure Laterality Date    BILATERAL SALPINGO-OOPHORECTOMY (BSO) Bilateral 1986    with hysterectomy done for abnormal cells    BREAST BIOPSY Left     benign    BREAST LUMPECTOMY      patient is unsure of date or laterality    CHOLECYSTECTOMY      COLONOSCOPY N/A 12/01/2017    Procedure: COLONOSCOPY;  Surgeon: Filemon Fuentes MD;  Location: [facility] ENDO ([address] [place] [address]);  Service: Endoscopy;  Laterality: N/A;    COLONOSCOPY N/A 12/05/2017    Procedure: COLONOSCOPY;  Surgeon: José Chavira MD;  Location: [facility] ENDO ([address] [place] [address]);  Service: Endoscopy;  Laterality: N/A;    EPIDURAL STEROID INJECTION INTO CERVICAL SPINE N/A 01/04/2022    Procedure: C5/6 IL WILBERTO;  Surgeon: Ras Caballero MD;  Location: [facility] PAIN MGT;  Service: Pain Management;  Laterality: N/A;    EXAMINATION UNDER ANESTHESIA N/A 02/18/2020    Procedure: EXAM UNDER ANESTHESIA;  Surgeon: Alden Horowitz MD;  Location: [facility] OR;  Service: General;  Laterality: N/A;    EXCISIONAL HEMORRHOIDECTOMY N/A 02/18/2020    Procedure: HEMORRHOIDECTOMY;  Surgeon: Alden Horowitz MD;  Location: [facility] OR;  Service: General;  Laterality: N/A;    HYSTERECTOMY  1986    due to abnormal paps - ovaries were removed    INJECTION OF ANESTHETIC AGENT AROUND PUDENDAL NERVE N/A  02/18/2020    Procedure: BLOCK, NERVE, PUDENDAL;  Surgeon: Alden Horowitz MD;  Location: [facility] OR;  Service: General;  Laterality: N/A;    INJECTION OF ANESTHETIC AGENT INTO SACROILIAC JOINT Right 06/14/2019    Procedure: right Sacroiliac Joint Injection;  Surgeon: David Desai MD;  Location: [facility] PAIN MGT;  Service: Pain Management;  Laterality: Right;    INJECTION OF ANESTHETIC AGENT INTO SACROILIAC JOINT Bilateral 02/07/2020    Procedure: Bilateral GT bursa + Bilateral Sacroiliac Joint Injection;  Surgeon: David Desai MD;  Location: [facility] PAIN MGT;  Service: Pain Management;  Laterality: Bilateral;    INJECTION OF ANESTHETIC AGENT INTO SACROILIAC JOINT Bilateral 07/07/2020    Procedure: Bilateral GT bursa +SIJ injection;  Surgeon: David Desai MD;  Location: [facility] PAIN MGT;  Service: Pain Management;  Laterality: Bilateral;    INJECTION OF ANESTHETIC AGENT INTO SACROILIAC JOINT Right 10/21/2020    Procedure: Right piriformis + right SIJ + right GT bursa injection;  Surgeon: David Desai MD;  Location: [facility] PAIN MGT;  Service: Pain Management;  Laterality: Right;    INJECTION OF ANESTHETIC AGENT INTO SACROILIAC JOINT Bilateral 04/15/2021    Procedure: Bilateral SIJ + Bilateral Piriformis + Bilateral GT Bursa Injection;  Surgeon: Flaco Frazier MD;  Location: [facility] PAIN MGT;  Service: Pain Management;  Laterality: Bilateral;    INJECTION OF ANESTHETIC AGENT INTO SACROILIAC JOINT Bilateral 04/21/2022    Procedure: Bilateral SIJ + Bilateral Piriformis + Bilateral GT Bursa Injection;  Surgeon: Ras Caballero MD;  Location: [facility] PAIN MGT;  Service: Pain Management;  Laterality: Bilateral;    INJECTION OF JOINT Bilateral 02/07/2020    Procedure: Bilateral GT bursa + Bilateral Sacroiliac Joint Injection;  Surgeon: David Desai MD;  Location: [facility] PAIN MGT;  Service: Pain Management;  Laterality: Bilateral;    INJECTION OF JOINT Bilateral 07/07/2020    Procedure: Bilateral GT bursa +SIJ  injection;  Surgeon: David Desai MD;  Location: [facility] PAIN MGT;  Service: Pain Management;  Laterality: Bilateral;    INJECTION OF JOINT Right 10/21/2020    Procedure: Right piriformis + right SIJ + right GT bursa injection;  Surgeon: David Desai MD;  [facility];  Service: Pain Management;  Laterality: Right;    INJECTION OF JOINT Bilateral 02/08/2021    Procedure: Bilateral GT Bursa Injection;  Surgeon: Anna Kaminski MD;  Location: [facility] PAIN MGT;  Service: Pain Management;  Laterality: Bilateral;    INJECTION OF JOINT Bilateral 04/15/2021    Procedure: Bilateral SIJ + Bilateral Piriformis + Bilateral GT Bursa Injection;  Surgeon: Flaco Frazier MD;  Location: [facility] PAIN MGT;  Service: Pain Management;  Laterality: Bilateral;    INJECTION OF JOINT Bilateral 04/21/2022    Procedure: Bilateral SIJ + Bilateral Piriformis + Bilateral GT Bursa Injection;  Surgeon: Ras Caballero MD;  Location: [facility] PAIN MGT;  Service: Pain Management;  Laterality: Bilateral;    INJECTION OF JOINT Right 10/11/2022    Procedure: right SIJ + right piriformis + right GT bursa injection;  Surgeon: Ras Caballero MD;  Location: [facility] PAIN MGT;  Service: Pain Management;  Laterality: Right;    INJECTION OF PIRIFORMIS MUSCLE Right 06/14/2019    Procedure: Right piriformis injection;  Surgeon: David Desai MD;  Location: [facility] PAIN MGT;  Service: Pain Management;  Laterality: Right;    INJECTION OF PIRIFORMIS MUSCLE Right 10/21/2020    Procedure: Right piriformis + right SIJ + right GT bursa injection;  Surgeon: David Desai MD;  Location: [facility] PAIN MGT;  Service: Pain Management;  Laterality: Right;    INJECTION OF PIRIFORMIS MUSCLE Bilateral 04/15/2021    Procedure: Bilateral SIJ + Bilateral Piriformis + Bilateral GT Bursa Injection;  Surgeon: Flaco Frazier MD;  Location: [facility] PAIN MGT;  Service: Pain Management;  Laterality: Bilateral;    INJECTION OF PIRIFORMIS MUSCLE Bilateral 04/21/2022     Procedure: Bilateral SIJ + Bilateral Piriformis + Bilateral GT Bursa Injection;  Surgeon: Ras Caballero MD;  Location: [facility] PAIN MGT;  Service: Pain Management;  Laterality: Bilateral;    LIVER TRANSPLANT  12/2017    SELECTIVE INJECTION OF ANESTHETIC AGENT AROUND LUMBAR SPINAL NERVE ROOT BY TRANSFORAMINAL APPROACH Bilateral 10/28/2021    Procedure: BLOCK, SPINAL NERVE ROOT, LUMBAR, SELECTIVE, TRANSFORAMINAL APPROACH bilateral L4-5 and Right Knee injection with RN IV sedation;  Surgeon: Flaco Frazier MD;  Location: [facility] PAIN MGT;  Service: Pain Management;  Laterality: Bilateral;    SELECTIVE INJECTION OF ANESTHETIC AGENT AROUND LUMBAR SPINAL NERVE ROOT BY TRANSFORAMINAL APPROACH Bilateral 08/18/2022    Procedure: Bilateral L4/5 TF WILBERTO;  Surgeon: Ras Caballero MD;  Location: [facility] PAIN MGT;  Service: Pain Management;  Laterality: Bilateral;      Latest Reference Range & Units 10/19/22 08:27   WBC 3.90 - 12.70 K/uL 12.70   RBC 4.00 - 5.40 M/uL 4.84   HEMOGLOBIN 12.0 - 16.0 g/dL 12.8   HEMATOCRIT 37.0 - 48.5 % 40.2   MCV 82 - 98 fL 83   MCH 27.0 - 31.0 pg 26.4 (L)   MCHC 32.0 - 36.0 g/dL 31.8 (L)   RDW 11.5 - 14.5 % 14.6 (H)   Platelets 150 - 450 K/uL 268   (L): Data is abnormally low  (H): Data is abnormally high   Latest Reference Range & Units 10/19/22 08:27   Sodium 136 - 145 mmol/L 141   Potassium 3.5 - 5.1 mmol/L 4.4   Chloride 95 - 110 mmol/L 106   CO2 23 - 29 mmol/L 25   ANION GAP 8 - 16 mmol/L 10   BUN 6 - 20 mg/dL 14   Creatinine 0.5 - 1.4 mg/dL 0.9     Pre-op Assessment    I have reviewed the Patient Summary Reports.     I have reviewed the Nursing Notes. I have reviewed the NPO Status.   I have reviewed the Medications.     Review of Systems  Anesthesia Hx:  No problems with previous Anesthesia Pt. History reviewed per PAYTON Pate; ok to proceed at [facility] History of prior surgery of interest to airway management or planning: liver transplant. Previous anesthesia: General 12/2017 liver  transplant with general anesthesia.  Procedure performed at an [facility]. Airway issues documented on chart review include mask, easy, GETA, easy direct laryngoscopy , view on direct laryngoscopy Grade I  Denies Family Hx of Anesthesia complications.   Denies Personal Hx of Anesthesia complications.   Social:  Non-Smoker, No Alcohol Use ETOH abuse history -resolved   Hematology/Oncology:     Oncology Normal    -- Anemia: Hematology Comments: Iron deficiency anemia, pt. Immunosuppressed s/p liver transplant    EENT/Dental:EENT/Dental Normal   Cardiovascular:   Exercise tolerance: good Hypertension, well controlled hyperlipidemia    Pulmonary:   Sleep Apnea, CPAP    Education provided regarding risk of obstructive sleep apnea     Hepatic/GI:   Bowel Prep. PUD, Liver Disease, Hepatitis Erosive esophagitis, alcoholic hepatitis, s/p liver transplant Dec. 2017   Musculoskeletal:   Arthritis  Spondylosis of lumbar region   Neurological:   Neuromuscular Disease, Piriformis syndrome   Endocrine:  Obesity / BMI > 30  Psych:   anxiety (acute stress reaction)          Physical Exam  General: Well nourished, Cooperative, Alert and Oriented    Airway:  Mallampati: II   Mouth Opening: Small, but > 3cm  TM Distance: Normal  Tongue: Normal  Neck ROM: Normal ROM    Dental:  Intact    Chest/Lungs:  Normal Respiratory Rate        Anesthesia Plan  Type of Anesthesia, risks & benefits discussed:    Anesthesia Type: Gen Natural Airway  Intra-op Monitoring Plan: Standard ASA Monitors  Post Op Pain Control Plan: multimodal analgesia  Induction:  IV  Informed Consent: Informed consent signed with the Patient and all parties understand the risks and agree with anesthesia plan.  All questions answered.   ASA Score: 3  Day of Surgery Review of History & Physical: H&P Update referred to the surgeon/provider.    Ready For Surgery From Anesthesia Perspective.     .

## 2022-12-12 ENCOUNTER — HOSPITAL ENCOUNTER (OUTPATIENT)
Facility: HOSPITAL | Age: 57
Discharge: HOME | End: 2022-12-12
Attending: INTERNAL MEDICINE | Admitting: INTERNAL MEDICINE
Payer: MEDICAID

## 2022-12-12 ENCOUNTER — ANESTHESIA (OUTPATIENT)
Dept: ENDOSCOPY | Facility: HOSPITAL | Age: 57
End: 2022-12-12
Payer: MEDICAID

## 2022-12-12 VITALS
HEART RATE: 68 BPM | WEIGHT: 221.13 LBS | RESPIRATION RATE: 20 BRPM | OXYGEN SATURATION: 100 % | SYSTOLIC BLOOD PRESSURE: 123 MMHG | TEMPERATURE: 97 F | HEIGHT: 65 IN | BODY MASS INDEX: 36.84 KG/M2 | DIASTOLIC BLOOD PRESSURE: 70 MMHG

## 2022-12-12 DIAGNOSIS — D50.0 IRON DEFICIENCY ANEMIA DUE TO CHRONIC BLOOD LOSS: ICD-10-CM

## 2022-12-12 DIAGNOSIS — K59.09 CHRONIC CONSTIPATION: Primary | ICD-10-CM

## 2022-12-12 PROCEDURE — 00811 ANES LWR INTST NDSC NOS: CPT | Performed by: INTERNAL MEDICINE

## 2022-12-12 PROCEDURE — 88305 TISSUE EXAM BY PATHOLOGIST: CPT | Performed by: PATHOLOGY

## 2022-12-12 PROCEDURE — D9220A PRA ANESTHESIA: ICD-10-PCS | Mod: CRNA,,, | Performed by: NURSE ANESTHETIST, CERTIFIED REGISTERED

## 2022-12-12 PROCEDURE — D9220A PRA ANESTHESIA: Mod: ANES,,, | Performed by: ANESTHESIOLOGY

## 2022-12-12 PROCEDURE — 63600175 PHARM REV CODE 636 W HCPCS: Performed by: INTERNAL MEDICINE

## 2022-12-12 PROCEDURE — 45385 PR COLONOSCOPY,REMV LESN,SNARE: ICD-10-PCS | Mod: ,,, | Performed by: INTERNAL MEDICINE

## 2022-12-12 PROCEDURE — 37000008 HC ANESTHESIA 1ST 15 MINUTES: Performed by: INTERNAL MEDICINE

## 2022-12-12 PROCEDURE — 25000003 PHARM REV CODE 250: Performed by: NURSE ANESTHETIST, CERTIFIED REGISTERED

## 2022-12-12 PROCEDURE — D9220A PRA ANESTHESIA: ICD-10-PCS | Mod: ANES,,, | Performed by: ANESTHESIOLOGY

## 2022-12-12 PROCEDURE — 45385 COLONOSCOPY W/LESION REMOVAL: CPT | Performed by: INTERNAL MEDICINE

## 2022-12-12 PROCEDURE — 37000009 HC ANESTHESIA EA ADD 15 MINS: Performed by: INTERNAL MEDICINE

## 2022-12-12 PROCEDURE — 88305 TISSUE EXAM BY PATHOLOGIST: ICD-10-PCS | Mod: 26,,, | Performed by: PATHOLOGY

## 2022-12-12 PROCEDURE — D9220A PRA ANESTHESIA: Mod: CRNA,,, | Performed by: NURSE ANESTHETIST, CERTIFIED REGISTERED

## 2022-12-12 PROCEDURE — 25000003 PHARM REV CODE 250: Performed by: INTERNAL MEDICINE

## 2022-12-12 PROCEDURE — 63600175 PHARM REV CODE 636 W HCPCS: Performed by: ANESTHESIOLOGY

## 2022-12-12 PROCEDURE — 88305 TISSUE EXAM BY PATHOLOGIST: CPT | Mod: 26,,, | Performed by: PATHOLOGY

## 2022-12-12 PROCEDURE — 27201089 HC SNARE, DISP (ANY): Performed by: INTERNAL MEDICINE

## 2022-12-12 PROCEDURE — 45385 COLONOSCOPY W/LESION REMOVAL: CPT | Mod: ,,, | Performed by: INTERNAL MEDICINE

## 2022-12-12 RX ORDER — LIDOCAINE HYDROCHLORIDE 20 MG/ML
INJECTION INTRAVENOUS
Status: DISCONTINUED | OUTPATIENT
Start: 2022-12-12 | End: 2022-12-12

## 2022-12-12 RX ORDER — SODIUM CHLORIDE, SODIUM LACTATE, POTASSIUM CHLORIDE, CALCIUM CHLORIDE 600; 310; 30; 20 MG/100ML; MG/100ML; MG/100ML; MG/100ML
INJECTION, SOLUTION INTRAVENOUS CONTINUOUS
Status: DISCONTINUED | OUTPATIENT
Start: 2022-12-12 | End: 2022-12-12 | Stop reason: HOSPADM

## 2022-12-12 RX ORDER — PROPOFOL 10 MG/ML
VIAL (ML) INTRAVENOUS
Status: DISCONTINUED | OUTPATIENT
Start: 2022-12-12 | End: 2022-12-12

## 2022-12-12 RX ORDER — DEXTROMETHORPHAN/PSEUDOEPHED 2.5-7.5/.8
DROPS ORAL
Status: DISCONTINUED | OUTPATIENT
Start: 2022-12-12 | End: 2022-12-12 | Stop reason: HOSPADM

## 2022-12-12 RX ADMIN — Medication 30 MG: at 08:12

## 2022-12-12 RX ADMIN — PROPOFOL 300 MG: 10 INJECTION, EMULSION INTRAVENOUS at 08:12

## 2022-12-12 RX ADMIN — SODIUM CHLORIDE, SODIUM LACTATE, POTASSIUM CHLORIDE, AND CALCIUM CHLORIDE: .6; .31; .03; .02 INJECTION, SOLUTION INTRAVENOUS at 07:12

## 2022-12-12 NOTE — DISCHARGE SUMMARY
[facility] - Endoscopy 1st Fl  Discharge Note  Short Stay    Procedure(s) (LRB):  COLONOSCOPY (N/A)      OUTCOME: Patient tolerated treatment/procedure well without complication and is now ready for discharge.    DISPOSITION: Home or Self Care    FINAL DIAGNOSIS:  Chronic constipation    FOLLOWUP: With primary care provider    DISCHARGE INSTRUCTIONS:  No discharge procedures on file.

## 2022-12-12 NOTE — H&P
Short Stay Endoscopy History and Physical    PCP - Paco Munoz MD    Procedure - Colonoscopy  ASA - 2  Mallampati - per anesthesia  History of Anesthesia problems - no  Family history Anesthesia problems -  no     HPI:  This is a 57 y.o. female here for evaluation of :   Active Hospital Problems    Diagnosis  POA    *Chronic constipation [K59.09]  No    Iron deficiency anemia due to chronic blood loss [D50.0]  Yes      Resolved Hospital Problems   No resolved problems to display.         Health Maintenance         Date Due Completion Date    COVID-19 Vaccine (4 - Booster for Pfizer series) 12/01/2021 10/6/2021    Influenza Vaccine (1) 09/01/2022 1/31/2022    Colorectal Cancer Screening 12/05/2022 12/5/2017    TETANUS VACCINE 01/10/2023 1/10/2013    Mammogram 09/07/2023 9/7/2022    Lipid Panel 12/13/2026 12/13/2021    Pneumococcal Vaccines (Age 0-64) (4 - PPSV23 if available, else PCV20) 10/04/2028 9/11/2020            ROS:  CONSTITUTIONAL: Denies weight change,  fatigue, fevers, chills, night sweats.  CARDIOVASCULAR: Denies chest pain, shortness of breath, orthopnea and edema.  RESPIRATORY: Denies cough, hemoptysis, dyspnea, and wheezing.  GI: See HPI.    Medical History:   Past Medical History:   Diagnosis Date    Alcohol abuse     Alcoholic hepatitis     Anemia of chronic disease     Arthritis     generialized    Cancer 12/26/2017    liver    Coagulopathy     Dyspnea on exertion 3/26/2020    Encounter for blood transfusion     End stage liver disease     History of alcohol abuse     Hyperlipidemia     Hypersomnia 9/11/2020    Hypertension     Hyponatremia     Liver cirrhosis     Liver transplant candidate 12/26/2017    Obesity (BMI 30-39.9) 1/5/2016    Sleep apnea        Surgical History:   Past Surgical History:   Procedure Laterality Date    BILATERAL SALPINGO-OOPHORECTOMY (BSO) Bilateral 1986    with hysterectomy done for abnormal cells    BREAST BIOPSY Left     benign    BREAST LUMPECTOMY       patient is unsure of date or laterality    CHOLECYSTECTOMY      COLONOSCOPY N/A 12/01/2017    Procedure: COLONOSCOPY;  Surgeon: Filemon Fuentes MD;  Location: [facility] ENDO (2ND FLR);  Service: Endoscopy;  Laterality: N/A;    COLONOSCOPY N/A 12/05/2017    Procedure: COLONOSCOPY;  Surgeon: José Chavira MD;  Location: [facility] ENDO (2ND FLR);  Service: Endoscopy;  Laterality: N/A;    EPIDURAL STEROID INJECTION INTO CERVICAL SPINE N/A 01/04/2022    Procedure: C5/6 IL WILBERTO;  Surgeon: Ras Caballero MD;  Location: [facility] PAIN MGT;  Service: Pain Management;  Laterality: N/A;    EXAMINATION UNDER ANESTHESIA N/A 02/18/2020    Procedure: EXAM UNDER ANESTHESIA;  Surgeon: Alden Horowitz MD;  Location: [facility] OR;  Service: General;  Laterality: N/A;    EXCISIONAL HEMORRHOIDECTOMY N/A 02/18/2020    Procedure: HEMORRHOIDECTOMY;  Surgeon: Alden Horowitz MD;  Location: [facility] OR;  Service: General;  Laterality: N/A;    HYSTERECTOMY  1986    due to abnormal paps - ovaries were removed    INJECTION OF ANESTHETIC AGENT AROUND PUDENDAL NERVE N/A 02/18/2020    Procedure: BLOCK, NERVE, PUDENDAL;  Surgeon: Alden Horowitz MD;  Location: [facility] OR;  Service: General;  Laterality: N/A;    INJECTION OF ANESTHETIC AGENT INTO SACROILIAC JOINT Right 06/14/2019    Procedure: right Sacroiliac Joint Injection;  Surgeon: David Desai MD;  Location: [facility] PAIN MGT;  Service: Pain Management;  Laterality: Right;    INJECTION OF ANESTHETIC AGENT INTO SACROILIAC JOINT Bilateral 02/07/2020    Procedure: Bilateral GT bursa + Bilateral Sacroiliac Joint Injection;  Surgeon: David Desai MD;  Location: [facility] PAIN MGT;  Service: Pain Management;  Laterality: Bilateral;    INJECTION OF ANESTHETIC AGENT INTO SACROILIAC JOINT Bilateral 07/07/2020    Procedure: Bilateral GT bursa +SIJ injection;  Surgeon: David Desai MD;  Location: [facility] PAIN MGT;  Service: Pain Management;  Laterality: Bilateral;    INJECTION OF ANESTHETIC AGENT INTO SACROILIAC  JOINT Right 10/21/2020    Procedure: Right piriformis + right SIJ + right GT bursa injection;  Surgeon: David Desai MD;  Location: [facility] PAIN MGT;  Service: Pain Management;  Laterality: Right;    INJECTION OF ANESTHETIC AGENT INTO SACROILIAC JOINT Bilateral 04/15/2021    Procedure: Bilateral SIJ + Bilateral Piriformis + Bilateral GT Bursa Injection;  Surgeon: Flaco Frazier MD;  Location: [facility] PAIN MGT;  Service: Pain Management;  Laterality: Bilateral;    INJECTION OF ANESTHETIC AGENT INTO SACROILIAC JOINT Bilateral 04/21/2022    Procedure: Bilateral SIJ + Bilateral Piriformis + Bilateral GT Bursa Injection;  Surgeon: Ras Caballero MD;  Location: [facility] PAIN MGT;  Service: Pain Management;  Laterality: Bilateral;    INJECTION OF JOINT Bilateral 02/07/2020    Procedure: Bilateral GT bursa + Bilateral Sacroiliac Joint Injection;  Surgeon: David Desai MD;  Location: [facility] PAIN MGT;  Service: Pain Management;  Laterality: Bilateral;    INJECTION OF JOINT Bilateral 07/07/2020    Procedure: Bilateral GT bursa +SIJ injection;  Surgeon: David Desai MD;  [facility];  Service: Pain Management;  Laterality: Bilateral;    INJECTION OF JOINT Right 10/21/2020    Procedure: Right piriformis + right SIJ + right GT bursa injection;  Surgeon: David Desai MD;  Location: [facility] PAIN MGT;  Service: Pain Management;  Laterality: Right;    INJECTION OF JOINT Bilateral 02/08/2021    Procedure: Bilateral GT Bursa Injection;  Surgeon: Anna Kaminski MD;  [facility];  Service: Pain Management;  Laterality: Bilateral;    INJECTION OF JOINT Bilateral 04/15/2021    Procedure: Bilateral SIJ + Bilateral Piriformis + Bilateral GT Bursa Injection;  Surgeon: Flaco Frazier MD;  [facility];  Service: Pain Management;  Laterality: Bilateral;    INJECTION OF JOINT Bilateral 04/21/2022    Procedure: Bilateral SIJ + Bilateral Piriformis + Bilateral GT Bursa Injection;  Surgeon: Ras Caballero,  MD;  Location: [facility] PAIN MGT;  Service: Pain Management;  Laterality: Bilateral;    INJECTION OF JOINT Right 10/11/2022    Procedure: right SIJ + right piriformis + right GT bursa injection;  Surgeon: Ras Caballero MD;  Location: [facility] PAIN MGT;  Service: Pain Management;  Laterality: Right;    INJECTION OF PIRIFORMIS MUSCLE Right 06/14/2019    Procedure: Right piriformis injection;  Surgeon: David Desai MD;  Location: [facility] PAIN MGT;  Service: Pain Management;  Laterality: Right;    INJECTION OF PIRIFORMIS MUSCLE Right 10/21/2020    Procedure: Right piriformis + right SIJ + right GT bursa injection;  Surgeon: David Desai MD;  Location: [facility] PAIN MGT;  Service: Pain Management;  Laterality: Right;    INJECTION OF PIRIFORMIS MUSCLE Bilateral 04/15/2021    Procedure: Bilateral SIJ + Bilateral Piriformis + Bilateral GT Bursa Injection;  Surgeon: Flaco Frazier MD;  Location: [facility] PAIN MGT;  Service: Pain Management;  Laterality: Bilateral;    INJECTION OF PIRIFORMIS MUSCLE Bilateral 04/21/2022    Procedure: Bilateral SIJ + Bilateral Piriformis + Bilateral GT Bursa Injection;  Surgeon: Ras Caballero MD;  Location: [facility] PAIN MGT;  Service: Pain Management;  Laterality: Bilateral;    LIVER TRANSPLANT  12/2017    SELECTIVE INJECTION OF ANESTHETIC AGENT AROUND LUMBAR SPINAL NERVE ROOT BY TRANSFORAMINAL APPROACH Bilateral 10/28/2021    Procedure: BLOCK, SPINAL NERVE ROOT, LUMBAR, SELECTIVE, TRANSFORAMINAL APPROACH bilateral L4-5 and Right Knee injection with RN IV sedation;  Surgeon: Flaco Frazier MD;  Location: [facility] PAIN MGT;  Service: Pain Management;  Laterality: Bilateral;    SELECTIVE INJECTION OF ANESTHETIC AGENT AROUND LUMBAR SPINAL NERVE ROOT BY TRANSFORAMINAL APPROACH Bilateral 08/18/2022    Procedure: Bilateral L4/5 TF WILBERTO;  Surgeon: Ras Caballero MD;  Location: [facility] PAIN MGT;  Service: Pain Management;  Laterality: Bilateral;       Family History:   Family History   Problem Relation Age of Onset     Hypertension Mother     Alzheimer's disease Mother     Hypertension Father     Diabetes Father     Diabetes Sister     Ovarian cancer Paternal Aunt     Depression Maternal Grandfather        Social History:   Social History     Tobacco Use    Smoking status: Never    Smokeless tobacco: Never   Substance Use Topics    Alcohol use: No     Comment: Currently admitted to inpatient rehab 7/2017    Drug use: No       Allergies:   Review of patient's allergies indicates:  No Known Allergies    Medications:   Current Facility-Administered Medications on File Prior to Encounter   Medication Dose Route Frequency Provider Last Rate Last Admin    ondansetron injection 4 mg  4 mg Intravenous Once PRN Ras Caballero MD         Current Outpatient Medications on File Prior to Encounter   Medication Sig Dispense Refill    albuterol (VENTOLIN HFA) 90 mcg/actuation inhaler Inhale 2 puffs into the lungs every 6 (six) hours as needed for Wheezing. Rescue (Patient not taking: Reported on 11/9/2022) 18 g 0    amitriptyline (ELAVIL) 25 MG tablet Take 1-2 tablets (25-50 mg total) by mouth nightly as needed for Insomnia or Pain. 60 tablet 1    atorvastatin (LIPITOR) 40 MG tablet Take 1 tablet (40 mg total) by mouth every evening. 90 tablet 2    cyclobenzaprine (FLEXERIL) 10 MG tablet TAKE 1/2 TO 1 TABLET BY MOUTH THREE TIMES DAILY AS NEEDED FOR MUSCLE SPASMS 90 tablet 1    ergocalciferol (ERGOCALCIFEROL) 50,000 unit Cap Take 1 capsule (50,000 Units total) by mouth every 7 days. 12 capsule 3    estradioL (ESTRACE) 0.01 % (0.1 mg/gram) vaginal cream Insert 1 applicator full vaginally times 2 weeks and then twice a week 42.5 g 6    famotidine (PEPCID) 20 MG tablet Take 1 tablet (20 mg total) by mouth 2 (two) times a day. 90 tablet 3    furosemide (LASIX) 40 MG tablet TAKE 1 TABLET BY MOUTH EVERY DAY 30 tablet 5    losartan (COZAAR) 25 MG tablet Take 1 tablet (25 mg total) by mouth once daily. 90 tablet 3    NIFEdipine (PROCARDIA-XL) 60 MG  (OSM) 24 hr tablet TAKE 1 TABLET(60 MG) BY MOUTH EVERY EVENING 90 tablet 3    potassium chloride (KLOR-CON) 10 MEQ TbSR TAKE 1 TABLET(10 MEQ) BY MOUTH TWICE DAILY 180 tablet 0    tacrolimus (PROGRAF) 1 MG Cap TAKE 2 CAPSULES BY MOUTH EVERY MORNING AND 1 CAPSULE EVERY EVENING 90 capsule 11       Physical Exam:  Vital Signs:   Vitals:    12/12/22 0744   BP: (!) 156/84   Pulse: 78   Resp: 16   Temp: 97 °F (36.1 °C)     General Appearance: Well appearing in no acute distress  ENT: OP clear  Chest: CTA B  CV: RRR, no m/r/g  Abd: s/nt/nd/nabs  Ext: no edema    Labs:Reviewed    IMP:  Active Hospital Problems    Diagnosis  POA    *Chronic constipation [K59.09]  No    Iron deficiency anemia due to chronic blood loss [D50.0]  Yes      Resolved Hospital Problems   No resolved problems to display.         Plan:   I have explained the risks and benefits of colonoscopy to the patient including but not limited to bleeding, perforation, infection, and death. The patient wishes to proceed.

## 2022-12-12 NOTE — ANESTHESIA POSTPROCEDURE EVALUATION
Anesthesia Post Evaluation    Patient: aMrcie Bazan    Procedure(s) Performed: Procedure(s) (LRB):  COLONOSCOPY (N/A)    Final Anesthesia Type: general      Patient location during evaluation: GI PACU  Patient participation: Yes- Able to Participate  Level of consciousness: awake and alert and oriented  Post-procedure vital signs: reviewed and stable  Pain management: adequate  Airway patency: patent    PONV status at discharge: No PONV  Anesthetic complications: no      Cardiovascular status: blood pressure returned to baseline  Respiratory status: unassisted, spontaneous ventilation and room air  Hydration status: euvolemic  Follow-up not needed.          Vitals Value Taken Time   BP  12/12/22 0913   Temp  12/12/22 0913   Pulse  12/12/22 0913   Resp  12/12/22 0913   SpO2  12/12/22 0913         No case tracking events are documented in the log.      Pain/Larry Score: No data recorded

## 2022-12-12 NOTE — PROVATION PATIENT INSTRUCTIONS
Discharge Summary/Instructions after an Endoscopic Procedure  Patient Name: Marcie Bazan  Patient MRN: [id number]  Patient YOB: 1965  Monday, December 12, 2022  Gogo Brown MD  Dear patient,  As a result of recent federal legislation (The Federal Cures Act), you may   receive lab or pathology results from your procedure in your [facility]   account before your physician is able to contact you. Your physician or   their representative will relay the results to you with their   recommendations at their soonest availability.  Thank you,  RESTRICTIONS:  During your procedure today, you received medications for sedation.  These   medications may affect your judgment, balance and coordination.  Therefore,   for 24 hours, you have the following restrictions:   - DO NOT drive a car, operate machinery, make legal/financial decisions,   sign important papers or drink alcohol.    ACTIVITY:  Today: no heavy lifting, straining or running due to procedural   sedation/anesthesia.  The following day: return to full activity including work.  DIET:  Eat and drink normally unless instructed otherwise.     TREATMENT FOR COMMON SIDE EFFECTS:  - Mild abdominal pain, nausea, belching, bloating or excessive gas:  rest,   eat lightly and use a heating pad.  - Sore Throat: treat with throat lozenges and/or gargle with warm salt   water.  - Because air was used during the procedure, expelling large amounts of air   from your rectum or belching is normal.  - If a bowel prep was taken, you may not have a bowel movement for 1-3 days.    This is normal.  SYMPTOMS TO WATCH FOR AND REPORT TO YOUR PHYSICIAN:  1. Abdominal pain or bloating, other than gas cramps.  2. Chest pain.  3. Back pain.  4. Signs of infection such as: chills or fever occurring within 24 hours   after the procedure.  5. Rectal bleeding, which would show as bright red, maroon, or black stools.   (A tablespoon of blood from the rectum is not serious, especially  if   hemorrhoids are present.)  6. Vomiting.  7. Weakness or dizziness.  GO DIRECTLY TO THE NEAREST EMERGENCY ROOM IF YOU HAVE ANY OF THE FOLLOWING:      Difficulty breathing              Chills and/or fever over 101 F   Persistent vomiting and/or vomiting blood   Severe abdominal pain   Severe chest pain   Black, tarry stools   Bleeding- more than one tablespoon   Any other symptom or condition that you feel may need urgent attention  Your doctor recommends these additional instructions:  If any biopsies were taken, your doctors clinic will contact you in 1 to 2   weeks with any results.  - Discharge patient to home (via wheelchair).   - Resume previous diet.   - Continue present medications.   - Await pathology results.   - Repeat colonoscopy in 7 years for surveillance.   - Telephone GI clinic for pathology results in 2 weeks.   - Patient has a contact number available for emergencies.  The signs and   symptoms of potential delayed complications were discussed with the   patient.  Return to normal activities tomorrow.  Written discharge   instructions were provided to the patient.  For questions, problems or results please call your physician Gogo Brown MD at Work:  [phone]  If you have any questions about the above instructions, call the GI   department at [phone] or call the endoscopy unit at [phone]   from 7am until 3 pm.  [facility] [place], EMERGENCY ROOM PHONE NUMBER:   [phone]  IF A COMPLICATION OR EMERGENCY SITUATION ARISES AND YOU ARE UNABLE TO REACH   YOUR PHYSICIAN - GO DIRECTLY TO THE EMERGENCY ROOM.  I have read or have had read to me these discharge instructions for my   procedure and have received a written copy.  I understand these   instructions and will follow-up with my physician if I have any questions.     __________________________________       _____________________________________  Nurse Signature                                           Patient/Designated   Responsible Party Signature  MD Gogo Faria MD  12/12/2022 9:10:39 AM  [employer]

## 2022-12-12 NOTE — TRANSFER OF CARE
"Anesthesia Transfer of Care Note    Patient: Marcie Bazan    Procedure(s) Performed: Procedure(s) (LRB):  COLONOSCOPY (N/A)    Patient location: GI    Anesthesia Type: general    Transport from OR: Transported from OR on room air with adequate spontaneous ventilation    Post pain: adequate analgesia    Post assessment: no apparent anesthetic complications    Post vital signs: stable    Level of consciousness: awake, alert and oriented    Nausea/Vomiting: no nausea/vomiting    Complications: none    Transfer of care protocol was followed      Last vitals:   Visit Vitals  BP (!) 156/84   Pulse 78   Temp 36.1 °C (97 °F)   Resp 16   Ht 5' 5" (1.651 m)   Wt 100.3 kg (221 lb 1.9 oz)   LMP 01/01/1985 (Approximate)   SpO2 97%   Breastfeeding No   BMI 36.80 kg/m²     "

## 2022-12-12 NOTE — PLAN OF CARE
Resting in bed, no distress noted   mALE ANNUAL EXAM note        CHIEF COMPLAINT:    Chief Complaint   Patient presents with   • Physical   • Asthma       History      Alberto Iqbal is a 23 year old male who presents for an annual exam.  Patient has bronchial asthma and currently uses a steroid inhaler daily and uses albuterol only as needed which is quite rare.  He has seasonal allergies and uses over-the-counter antihistamines which helped.  No chest pain, shortness of breath palpitation.  Normal bowels and no urinary symptoms.    medical history      Past Medical History:   Diagnosis Date   • Bronchial asthma        SURGICAL history      Past Surgical History:   Procedure Laterality Date   • Appendectomy     • Cholecystectomy         social history      Social History     Tobacco Use   • Smoking status: Never Smoker   • Smokeless tobacco: Never Used   Substance Use Topics   • Alcohol use: Yes     Alcohol/week: 3.0 standard drinks     Types: 3 Standard drinks or equivalent per week       family history      Family History   Problem Relation Age of Onset   • Asthma Father        mEDICATIONS      Current Outpatient Medications   Medication Sig   • Multiple Vitamins-Minerals (VITAMIN D3 COMPLETE PO)    • WIXELA INHUB 100-50 MCG/DOSE inhaler INHALE 1 PUFFS INTO THE LUNGS TWICE DAILY. PATIENT DUE FOR LABS   • albuterol (VENTOLIN HFA) 108 (90 BASE) MCG/ACT inhaler Inhale 2 puffs into the lungs every 4 hours as needed for Shortness of Breath or Wheezing.     No current facility-administered medications for this visit.        aLLERGIES      ALLERGIES:   Allergen Reactions   • Seasonal Other (See Comments)       HEALTHCARE PROVIDERS    Patient Care Team:  Gabo Tan MD as PCP - General (Internal Medicine)    Review of systems      Constitutional:  Denies fevers, chills, weakness, fatigue, loss of appetite, abnormal weight gain or abnormal weight loss.    Eyes:  Denies blindness, blurred vision, double vision, pain, itching or burning.    HENT:  Denies  facial pain, ear pain, hearing loss, tinnitus, nasal congestion, rhinorrhea, epistaxis, sinus pain, mouth lesions or sore throat.    Respiratory:  Denies shortness of breath, cough, sputum production, hemoptysis or wheezing.    Cardiovascular:  Denies chest pains, palpitations, tachycardia, edema, cyanosis or vertigo.    Gastrointestinal:  Denies abdominal pain, heartburn, nausea, vomiting, diarrhea, constipation or blood in stool.    Musculoskeletal:  Denies back pain, joint pain, joint swelling or tenderness, muscle pains or spasms.  Denies neck pain, stiffness or swelling.    Neurologic:  Denies numbness, tingling, other sensory changes, sudden weakness in arms or legs.  Denies confusion, headache, dizziness, memory loss or tremors.    Genitourinary:  Denies urinary frequency, nocturia, urgency, incontinence, dysuria or hematuria.    Hematologic/Lymphatic:  Denies easy bruising or bleeding, swollen lymph glands.    Endocrine:  Denies heat or cold intolerance, polydipsia or polyuria.  Denies changes in hair or skin texture.    Integument:  Denies new rashes or lesions, pruritus or dryness of skin.    Psychiatric:  Denies anxiety, depression, hallucinations, irritability or sleeping problems.   Allergic/Immunologic:  Denies recurrent infections, hypersensitivity.      All other Review of Systems negative.      Physical ExamINATION      Vital Signs:    Vitals:    01/29/20 1259   BP: 110/70   Pulse: 68   Resp: 14   Temp: 97.9 °F (36.6 °C)   Weight: 110.2 kg   Height: 6' 3.5\" (1.918 m)     General:  Well developed, well nourished. In no apparent distress.    Eyes:  PERRL(Pupils equal, round, reactive to light), EOMI(Extraocular movements intact). Conjunctivae pink. Sclerae anicteric.    Head, Ears, Nose, Throat:  Normocephalic, atraumatic. Bilateral external ears are normal. Mucosal membranes moist. External nose is normal. Oropharynx is clear.    Neck:  Supple. Nontender. Normal range of motion. No masses. No  thyromegaly.  Trachea midline.  Respiratory:  Normal respiratory effort. No chest wall tenderness. Lungs clear to auscultation bilaterally. Symmetrical chest expansion.    Cardiovascular:  Regular rate and rhythm. No murmurs, rubs or gallops. Normal S1 and S2. No S3 or S4. No JVD(jugular venous distention). No carotid bruits. Good dorsalis pedis pulses bilaterally. No peripheral edema.   Gastrointestinal:  Soft. Nontender. Nondistended. Normal bowel sounds. No pulsatile or other abdominal masses. No hepatosplenomegaly or splenomegaly.    Genitourinary:  No hernias a normal testicular palpation  Rectal:  Not performed  Musculoskeletal:  No clubbing or cyanosis. Full range of motion in all 4 extremities proximal and distal. No joint swelling or tenderness in right and left shoulders, elbows, wrists and fingers. No joint swelling or tenderness in left and right knees, ankles and toes.  Neurologic:  Alert and oriented times 3. Gait is normal. Normal sensory function. No motor deficits in all 4 extremities. Cranial nerves II-XII intact. Symmetrical bilateral knee deep tendon reflexes.  Negative Babinski.    Integumentary:  Warm. Dry. Pink. No rashes or lesions. No wounds.    Lymphatic:  No lymphadenopathy in submental, submandibular or cervical chain. No supraclavicular or infraclavicular lymphadenopathy. No axillary or inguinal/groin lymphadenopathy.    Psychiatric: Cooperative. Appropriate mood and affect. Normal judgment.     Recent PHQ 2/9 Score    PHQ 2:  Date Adult PHQ 2 Score   1/29/2020 0       PHQ 9:  Date Adult PHQ 9 Score   1/29/2020 0       DEPRESSION ASSESSMENT/PLAN:  Depression screening is negative no further plan needed.      Results      Pertinent labs and imaging studies reviewed.      Assessment AND Plan        1. Annual physical exam  Examination today was normal. He was advised about exercise and physical activity, diet and weight control, prevention of skin cancer and use of sunscreens, intake of  vitamin D3 regularly, drinking alcohol in moderation and avoiding smoking and other healthy lifestyle changes. Printed handouts were also provided.      2. Mild intermittent asthma without complication  Very well controlled and patient will continue to use his current steroid inhaler as well as albuterol as needed.    3. Allergic rhinitis, unspecified seasonality, unspecified trigger  Symptoms well controlled and patient takes over-the-counter antihistamines only as needed.            Orders Placed This Encounter   • Multiple Vitamins-Minerals (VITAMIN D3 COMPLETE PO)         Gabo Tan MD

## 2022-12-13 ENCOUNTER — PATIENT MESSAGE (OUTPATIENT)
Dept: PAIN MEDICINE | Facility: CLINIC | Age: 57
End: 2022-12-13
Payer: MEDICAID

## 2022-12-15 LAB
FINAL PATHOLOGIC DIAGNOSIS: NORMAL
Lab: NORMAL

## 2022-12-16 ENCOUNTER — TELEPHONE (OUTPATIENT)
Dept: INTERNAL MEDICINE | Facility: CLINIC | Age: 57
End: 2022-12-16
Payer: MEDICAID

## 2022-12-16 NOTE — TELEPHONE ENCOUNTER
----- Message from Oksana Grimm sent at 12/16/2022  8:07 AM CST -----  States she would like to reschedule her appt w/ Dr Munoz. Nothing coming up on the schedule. Please call pt [phone]. Thank you

## 2022-12-16 NOTE — TELEPHONE ENCOUNTER
Spoke with patient and assisted her in rescheduling her appointment with Dr. Munoz on 12/23/22 at 3:30 pm. She verbalized understanding.

## 2022-12-23 ENCOUNTER — TELEPHONE (OUTPATIENT)
Dept: INTERNAL MEDICINE | Facility: CLINIC | Age: 57
End: 2022-12-23
Payer: MEDICAID

## 2022-12-23 NOTE — TELEPHONE ENCOUNTER
Spoke with patient and scheduled her follow up appointment with Richard Bedoya NP on 1/23/22. She verbalized understanding.

## 2022-12-23 NOTE — TELEPHONE ENCOUNTER
----- Message from Ely Alex sent at 12/23/2022  1:38 PM CST -----  Pt is requesting a call back in regards to getting appt that she had to cancel reschedule. Pt states that she is not feeling good. Pt can be reached at [phone]
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
## 2023-01-02 NOTE — ASSESSMENT & PLAN NOTE
-- Holding diuretics at present, s/p 2L removed on 11/22 with testing negative for SBP  -- No abdominal pain today         No

## 2023-01-11 ENCOUNTER — TELEPHONE (OUTPATIENT)
Dept: TRANSPLANT | Facility: CLINIC | Age: 58
End: 2023-01-11
Payer: MEDICAID

## 2023-01-18 ENCOUNTER — LAB VISIT (OUTPATIENT)
Dept: LAB | Facility: HOSPITAL | Age: 58
End: 2023-01-18
Attending: INTERNAL MEDICINE
Payer: MEDICAID

## 2023-01-18 DIAGNOSIS — E83.42 HYPOMAGNESEMIA: ICD-10-CM

## 2023-01-18 DIAGNOSIS — Z94.4 LIVER REPLACED BY TRANSPLANT: ICD-10-CM

## 2023-01-18 LAB
ALBUMIN SERPL BCP-MCNC: 3.5 G/DL (ref 3.5–5.2)
ALP SERPL-CCNC: 191 U/L (ref 55–135)
ALT SERPL W/O P-5'-P-CCNC: 10 U/L (ref 10–44)
ANION GAP SERPL CALC-SCNC: 7 MMOL/L (ref 8–16)
AST SERPL-CCNC: 9 U/L (ref 10–40)
BASOPHILS # BLD AUTO: 0.04 K/UL (ref 0–0.2)
BASOPHILS NFR BLD: 0.5 % (ref 0–1.9)
BILIRUB SERPL-MCNC: 0.8 MG/DL (ref 0.1–1)
BUN SERPL-MCNC: 8 MG/DL (ref 6–20)
CALCIUM SERPL-MCNC: 9.2 MG/DL (ref 8.7–10.5)
CHLORIDE SERPL-SCNC: 106 MMOL/L (ref 95–110)
CO2 SERPL-SCNC: 26 MMOL/L (ref 23–29)
CREAT SERPL-MCNC: 0.8 MG/DL (ref 0.5–1.4)
DIFFERENTIAL METHOD: ABNORMAL
EOSINOPHIL # BLD AUTO: 0.2 K/UL (ref 0–0.5)
EOSINOPHIL NFR BLD: 2.6 % (ref 0–8)
ERYTHROCYTE [DISTWIDTH] IN BLOOD BY AUTOMATED COUNT: 14.6 % (ref 11.5–14.5)
EST. GFR  (NO RACE VARIABLE): >60 ML/MIN/1.73 M^2
GGT SERPL-CCNC: 23 U/L (ref 8–55)
GLUCOSE SERPL-MCNC: 99 MG/DL (ref 70–110)
HCT VFR BLD AUTO: 40.7 % (ref 37–48.5)
HGB BLD-MCNC: 12.5 G/DL (ref 12–16)
IMM GRANULOCYTES # BLD AUTO: 0.02 K/UL (ref 0–0.04)
IMM GRANULOCYTES NFR BLD AUTO: 0.2 % (ref 0–0.5)
LYMPHOCYTES # BLD AUTO: 1.9 K/UL (ref 1–4.8)
LYMPHOCYTES NFR BLD: 22 % (ref 18–48)
MAGNESIUM SERPL-MCNC: 1.5 MG/DL (ref 1.6–2.6)
MCH RBC QN AUTO: 25.6 PG (ref 27–31)
MCHC RBC AUTO-ENTMCNC: 30.7 G/DL (ref 32–36)
MCV RBC AUTO: 83 FL (ref 82–98)
MONOCYTES # BLD AUTO: 0.5 K/UL (ref 0.3–1)
MONOCYTES NFR BLD: 6.1 % (ref 4–15)
NEUTROPHILS # BLD AUTO: 6 K/UL (ref 1.8–7.7)
NEUTROPHILS NFR BLD: 68.6 % (ref 38–73)
NRBC BLD-RTO: 0 /100 WBC
PLATELET # BLD AUTO: 105 K/UL (ref 150–450)
PMV BLD AUTO: 10.4 FL (ref 9.2–12.9)
POTASSIUM SERPL-SCNC: 3.4 MMOL/L (ref 3.5–5.1)
PROT SERPL-MCNC: 7.4 G/DL (ref 6–8.4)
RBC # BLD AUTO: 4.89 M/UL (ref 4–5.4)
SODIUM SERPL-SCNC: 139 MMOL/L (ref 136–145)
WBC # BLD AUTO: 8.81 K/UL (ref 3.9–12.7)

## 2023-01-18 PROCEDURE — 82977 ASSAY OF GGT: CPT | Performed by: INTERNAL MEDICINE

## 2023-01-18 PROCEDURE — 85025 COMPLETE CBC W/AUTO DIFF WBC: CPT | Performed by: INTERNAL MEDICINE

## 2023-01-18 PROCEDURE — 80197 ASSAY OF TACROLIMUS: CPT | Performed by: INTERNAL MEDICINE

## 2023-01-18 PROCEDURE — 36415 COLL VENOUS BLD VENIPUNCTURE: CPT | Performed by: INTERNAL MEDICINE

## 2023-01-18 PROCEDURE — 80053 COMPREHEN METABOLIC PANEL: CPT | Performed by: INTERNAL MEDICINE

## 2023-01-18 PROCEDURE — 83735 ASSAY OF MAGNESIUM: CPT | Performed by: INTERNAL MEDICINE

## 2023-01-19 ENCOUNTER — PATIENT MESSAGE (OUTPATIENT)
Dept: TRANSPLANT | Facility: CLINIC | Age: 58
End: 2023-01-19
Payer: MEDICAID

## 2023-01-19 ENCOUNTER — TELEPHONE (OUTPATIENT)
Dept: TRANSPLANT | Facility: CLINIC | Age: 58
End: 2023-01-19
Payer: MEDICAID

## 2023-01-19 LAB — TACROLIMUS BLD-MCNC: 3 NG/ML (ref 5–15)

## 2023-01-19 NOTE — TELEPHONE ENCOUNTER
Letter sent to patient stating: Your labs have been reviewed by your Transplant physician, no action required. Next labs due 2/20/2023          ----- Message from Joselin Souza MD sent at 1/19/2023  4:13 PM CST -----  Liver tests ok but tacro low repeat lab in a month

## 2023-01-19 NOTE — LETTER
January 19, 2023    Marcie Bazan  [address] [address]          Dear Marcie Bazan:  MRN: [id number]    This is a follow up to your recent labs, your lab results were stable.  There are no medicine changes.  Please have your labs drawn again on 2/20/2023.      If you cannot have your labs drawn on the scheduled date, it is your responsibility to call the transplant department to reschedule.  Please call [phone] and ask to speak to Mica PURVIS -  for all scheduling requests.     Sincerely,    Viola SANDHUN, RN      Your Liver Transplant Coordinator    [facility]  [address] [place] [id number] [phone]

## 2023-01-19 NOTE — LETTER
January 19, 2023    Marcie Bazan  [address] [facility] [address]          Dear Marcie Bazan:  MRN: [id number]    This is a follow up to your recent labs, your lab results were stable.  There are no medicine changes.  Please have your labs drawn again on 2/20/2023.      If you cannot have your labs drawn on the scheduled date, it is your responsibility to call the transplant department to reschedule.  Please call [phone] and ask to speak to Mica PURVIS -  for all scheduling requests.     Sincerely,    Viola SANDHUN, RN        Your Liver Transplant Coordinator    [facility]  [address] [place] [address] [phone]

## 2023-01-25 DIAGNOSIS — Z94.4 LIVER REPLACED BY TRANSPLANT: Primary | ICD-10-CM

## 2023-02-09 ENCOUNTER — PATIENT MESSAGE (OUTPATIENT)
Dept: PAIN MEDICINE | Facility: CLINIC | Age: 58
End: 2023-02-09

## 2023-02-09 ENCOUNTER — OFFICE VISIT (OUTPATIENT)
Dept: PAIN MEDICINE | Facility: CLINIC | Age: 58
End: 2023-02-09
Payer: MEDICAID

## 2023-02-09 VITALS — HEIGHT: 65 IN | BODY MASS INDEX: 36.8 KG/M2

## 2023-02-09 DIAGNOSIS — G89.4 CHRONIC PAIN SYNDROME: ICD-10-CM

## 2023-02-09 DIAGNOSIS — G57.01 PIRIFORMIS SYNDROME, RIGHT: ICD-10-CM

## 2023-02-09 DIAGNOSIS — M79.18 PIRIFORMIS MUSCLE PAIN: ICD-10-CM

## 2023-02-09 DIAGNOSIS — M46.1 SACROILIITIS: ICD-10-CM

## 2023-02-09 DIAGNOSIS — G89.29 INSOMNIA SECONDARY TO CHRONIC PAIN: ICD-10-CM

## 2023-02-09 DIAGNOSIS — M54.16 RIGHT LUMBAR RADICULOPATHY: Primary | ICD-10-CM

## 2023-02-09 DIAGNOSIS — G47.01 INSOMNIA SECONDARY TO CHRONIC PAIN: ICD-10-CM

## 2023-02-09 PROCEDURE — 99214 PR OFFICE/OUTPT VISIT, EST, LEVL IV, 30-39 MIN: ICD-10-PCS | Mod: 95,,, | Performed by: PHYSICIAN ASSISTANT

## 2023-02-09 PROCEDURE — 99214 OFFICE O/P EST MOD 30 MIN: CPT | Mod: 95,,, | Performed by: PHYSICIAN ASSISTANT

## 2023-02-09 PROCEDURE — 1160F RVW MEDS BY RX/DR IN RCRD: CPT | Mod: CPTII,95,, | Performed by: PHYSICIAN ASSISTANT

## 2023-02-09 PROCEDURE — 3008F BODY MASS INDEX DOCD: CPT | Mod: CPTII,95,, | Performed by: PHYSICIAN ASSISTANT

## 2023-02-09 PROCEDURE — 1160F PR REVIEW ALL MEDS BY PRESCRIBER/CLIN PHARMACIST DOCUMENTED: ICD-10-PCS | Mod: CPTII,95,, | Performed by: PHYSICIAN ASSISTANT

## 2023-02-09 PROCEDURE — 1159F MED LIST DOCD IN RCRD: CPT | Mod: CPTII,95,, | Performed by: PHYSICIAN ASSISTANT

## 2023-02-09 PROCEDURE — 1159F PR MEDICATION LIST DOCUMENTED IN MEDICAL RECORD: ICD-10-PCS | Mod: CPTII,95,, | Performed by: PHYSICIAN ASSISTANT

## 2023-02-09 PROCEDURE — 3008F PR BODY MASS INDEX (BMI) DOCUMENTED: ICD-10-PCS | Mod: CPTII,95,, | Performed by: PHYSICIAN ASSISTANT

## 2023-02-09 RX ORDER — CYCLOBENZAPRINE HCL 10 MG
TABLET ORAL
Qty: 90 TABLET | Refills: 1 | Status: SHIPPED | OUTPATIENT
Start: 2023-02-09 | End: 2023-08-03 | Stop reason: SDUPTHER

## 2023-02-09 RX ORDER — DULOXETIN HYDROCHLORIDE 20 MG/1
20 CAPSULE, DELAYED RELEASE ORAL DAILY
Qty: 30 CAPSULE | Refills: 1 | Status: SHIPPED | OUTPATIENT
Start: 2023-02-09 | End: 2023-07-06

## 2023-02-09 RX ORDER — AMITRIPTYLINE HYDROCHLORIDE 25 MG/1
25-50 TABLET, FILM COATED ORAL NIGHTLY PRN
Qty: 60 TABLET | Refills: 1 | Status: SHIPPED | OUTPATIENT
Start: 2023-02-09 | End: 2023-04-17

## 2023-02-09 NOTE — PROGRESS NOTES
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 Encouraged regular exercise. Continue exercises and activities as tolerated. She went to PT, but she felt pain worse when she got home.    4. Diagnostic: None.     5. Consult/ Referral: Previously placed referral to Neurology for new onset headaches for a few months with associated dizziness & nausea.  She saw primary care who told her to ask our dept about the headaches. Per PCP note-believed to be occipital neuralgia. Headaches have resolved, so will hold off for now.    6. Follow up: 4 weeks post-procedure - virtual visit      - This condition does not require this patient to take time off of work, and the primary goal of our Pain Management services is to improve the patient's functional capacity.   - I discussed the risks, benefits, and alternatives to potential treatment options. All questions and concerns were fully addressed today in clinic.         Emilee Buitrago PA-C  Interventional Pain Management - [facility]    Disclaimer:  This note was prepared using voice recognition system and is likely to have sound alike errors that may have been overlooked even after proof reading.  Please call me with any questions.

## 2023-02-14 ENCOUNTER — PATIENT MESSAGE (OUTPATIENT)
Dept: ADMINISTRATIVE | Facility: OTHER | Age: 58
End: 2023-02-14
Payer: MEDICAID

## 2023-02-15 ENCOUNTER — PATIENT MESSAGE (OUTPATIENT)
Dept: PAIN MEDICINE | Facility: CLINIC | Age: 58
End: 2023-02-15
Payer: MEDICAID

## 2023-02-23 ENCOUNTER — LAB VISIT (OUTPATIENT)
Dept: LAB | Facility: HOSPITAL | Age: 58
End: 2023-02-23
Attending: INTERNAL MEDICINE
Payer: MEDICAID

## 2023-02-23 DIAGNOSIS — Z94.4 LIVER REPLACED BY TRANSPLANT: ICD-10-CM

## 2023-02-23 LAB
ALBUMIN SERPL BCP-MCNC: 3.8 G/DL (ref 3.5–5.2)
ALP SERPL-CCNC: 174 U/L (ref 55–135)
ALT SERPL W/O P-5'-P-CCNC: 10 U/L (ref 10–44)
ANION GAP SERPL CALC-SCNC: 10 MMOL/L (ref 8–16)
AST SERPL-CCNC: 10 U/L (ref 10–40)
BASOPHILS # BLD AUTO: 0.05 K/UL (ref 0–0.2)
BASOPHILS NFR BLD: 0.6 % (ref 0–1.9)
BILIRUB SERPL-MCNC: 0.4 MG/DL (ref 0.1–1)
BUN SERPL-MCNC: 13 MG/DL (ref 6–20)
CALCIUM SERPL-MCNC: 9.8 MG/DL (ref 8.7–10.5)
CHLORIDE SERPL-SCNC: 105 MMOL/L (ref 95–110)
CO2 SERPL-SCNC: 25 MMOL/L (ref 23–29)
CREAT SERPL-MCNC: 1 MG/DL (ref 0.5–1.4)
DIFFERENTIAL METHOD: ABNORMAL
EOSINOPHIL # BLD AUTO: 0.3 K/UL (ref 0–0.5)
EOSINOPHIL NFR BLD: 3.1 % (ref 0–8)
ERYTHROCYTE [DISTWIDTH] IN BLOOD BY AUTOMATED COUNT: 15.1 % (ref 11.5–14.5)
EST. GFR  (NO RACE VARIABLE): >60 ML/MIN/1.73 M^2
GGT SERPL-CCNC: 22 U/L (ref 8–55)
GLUCOSE SERPL-MCNC: 100 MG/DL (ref 70–110)
HCT VFR BLD AUTO: 41.6 % (ref 37–48.5)
HGB BLD-MCNC: 12.9 G/DL (ref 12–16)
IMM GRANULOCYTES # BLD AUTO: 0.03 K/UL (ref 0–0.04)
IMM GRANULOCYTES NFR BLD AUTO: 0.3 % (ref 0–0.5)
LYMPHOCYTES # BLD AUTO: 2 K/UL (ref 1–4.8)
LYMPHOCYTES NFR BLD: 22.4 % (ref 18–48)
MCH RBC QN AUTO: 25.2 PG (ref 27–31)
MCHC RBC AUTO-ENTMCNC: 31 G/DL (ref 32–36)
MCV RBC AUTO: 81 FL (ref 82–98)
MONOCYTES # BLD AUTO: 0.6 K/UL (ref 0.3–1)
MONOCYTES NFR BLD: 6.6 % (ref 4–15)
NEUTROPHILS # BLD AUTO: 6.1 K/UL (ref 1.8–7.7)
NEUTROPHILS NFR BLD: 67 % (ref 38–73)
NRBC BLD-RTO: 0 /100 WBC
PLATELET # BLD AUTO: 112 K/UL (ref 150–450)
PMV BLD AUTO: 12.3 FL (ref 9.2–12.9)
POTASSIUM SERPL-SCNC: 3.8 MMOL/L (ref 3.5–5.1)
PROT SERPL-MCNC: 7.7 G/DL (ref 6–8.4)
RBC # BLD AUTO: 5.11 M/UL (ref 4–5.4)
SODIUM SERPL-SCNC: 140 MMOL/L (ref 136–145)
WBC # BLD AUTO: 9.09 K/UL (ref 3.9–12.7)

## 2023-02-23 PROCEDURE — 36415 COLL VENOUS BLD VENIPUNCTURE: CPT | Performed by: INTERNAL MEDICINE

## 2023-02-23 PROCEDURE — 82977 ASSAY OF GGT: CPT | Performed by: INTERNAL MEDICINE

## 2023-02-23 PROCEDURE — 80053 COMPREHEN METABOLIC PANEL: CPT | Performed by: INTERNAL MEDICINE

## 2023-02-23 PROCEDURE — 80197 ASSAY OF TACROLIMUS: CPT | Performed by: INTERNAL MEDICINE

## 2023-02-23 PROCEDURE — 85025 COMPLETE CBC W/AUTO DIFF WBC: CPT | Performed by: INTERNAL MEDICINE

## 2023-02-23 NOTE — PRE-PROCEDURE INSTRUCTIONS
Spoke with patient regarding procedure scheduled on 2.28    Arrival time 1045    Has patient been sick with fever or on antibiotics within the last 7 days? No    Does the patient have any open wounds, sores or rashes? No    Does the patient have any recent fractures? no    Has patient received a vaccination within the last 7 days? No    Received the COVID vaccination? yes    Has the patient stopped all medications as directed? NA    Does patient have a pacemaker and or defibrillator? no    Does the patient have a ride to and from procedure and someone reliable to remain with patient? Yaniv rodriguez     Is the patient diabetic? no    Does the patient have sleep apnea? Or use O2 at home? Jem on cpap    Is the patient receiving sedation? yes    Is the patient instructed to remain NPO beginning at midnight the night before their procedure? yes    Procedure location confirmed with patient? Yes    Covid- Denies signs/symptoms. Instructed to notify PAT/MD if any changes.

## 2023-02-24 ENCOUNTER — TELEPHONE (OUTPATIENT)
Dept: TRANSPLANT | Facility: CLINIC | Age: 58
End: 2023-02-24
Payer: MEDICAID

## 2023-02-24 LAB — TACROLIMUS BLD-MCNC: 5.2 NG/ML (ref 5–15)

## 2023-02-24 NOTE — TELEPHONE ENCOUNTER
Letter sent to patient stating: Your labs have been reviewed by your Transplant physician, no action required. Next labs due 5/22/23          ----- Message from Joselin Souza MD sent at 2/24/2023 12:25 PM CST -----  Reviewed, nothing to do; repeat per routine

## 2023-02-24 NOTE — LETTER
February 24, 2023    Marcie Beni  [address] [facility] [address]          Dear Marcie Bazan:  MRN: [id number]    This is a follow up to your recent labs, your lab results were stable.  There are no medicine changes.  Please have your labs drawn again on 5/22/23.      If you cannot have your labs drawn on the scheduled date, it is your responsibility to call the transplant department to reschedule.  Please call [phone] and ask to speak to Mica PURVIS -  for all scheduling requests.     Sincerely,  Viola SANDHUN, RN          Your Liver Transplant Coordinator    [facility]  [address] [place] [id number] [phone]

## 2023-02-28 ENCOUNTER — HOSPITAL ENCOUNTER (OUTPATIENT)
Facility: HOSPITAL | Age: 58
Discharge: HOME | End: 2023-02-28
Attending: PHYSICAL MEDICINE & REHABILITATION | Admitting: PHYSICAL MEDICINE & REHABILITATION
Payer: MEDICAID

## 2023-02-28 ENCOUNTER — TELEPHONE (OUTPATIENT)
Dept: PHYSICAL MEDICINE AND REHAB | Facility: CLINIC | Age: 58
End: 2023-02-28
Payer: MEDICAID

## 2023-02-28 VITALS
HEART RATE: 80 BPM | HEIGHT: 65 IN | DIASTOLIC BLOOD PRESSURE: 76 MMHG | BODY MASS INDEX: 36.96 KG/M2 | RESPIRATION RATE: 16 BRPM | WEIGHT: 221.81 LBS | OXYGEN SATURATION: 100 % | TEMPERATURE: 98 F | SYSTOLIC BLOOD PRESSURE: 144 MMHG

## 2023-02-28 DIAGNOSIS — M54.16 LUMBAR RADICULOPATHY: Primary | ICD-10-CM

## 2023-02-28 DIAGNOSIS — G56.00 CARPAL TUNNEL SYNDROME, UNSPECIFIED LATERALITY: Primary | ICD-10-CM

## 2023-02-28 PROCEDURE — 62323 NJX INTERLAMINAR LMBR/SAC: CPT | Performed by: PHYSICAL MEDICINE & REHABILITATION

## 2023-02-28 PROCEDURE — 62323 PR INJ LUMBAR/SACRAL, W/IMAGING GUIDANCE: ICD-10-PCS | Mod: ,,, | Performed by: PHYSICAL MEDICINE & REHABILITATION

## 2023-02-28 PROCEDURE — 62323 NJX INTERLAMINAR LMBR/SAC: CPT | Mod: ,,, | Performed by: PHYSICAL MEDICINE & REHABILITATION

## 2023-02-28 PROCEDURE — 25000003 PHARM REV CODE 250: Performed by: PHYSICAL MEDICINE & REHABILITATION

## 2023-02-28 PROCEDURE — 63600175 PHARM REV CODE 636 W HCPCS: Performed by: PHYSICAL MEDICINE & REHABILITATION

## 2023-02-28 PROCEDURE — 25500020 PHARM REV CODE 255: Performed by: PHYSICAL MEDICINE & REHABILITATION

## 2023-02-28 RX ORDER — BUPIVACAINE HYDROCHLORIDE 2.5 MG/ML
INJECTION, SOLUTION EPIDURAL; INFILTRATION; INTRACAUDAL
Status: DISCONTINUED | OUTPATIENT
Start: 2023-02-28 | End: 2023-02-28 | Stop reason: HOSPADM

## 2023-02-28 RX ORDER — MIDAZOLAM HYDROCHLORIDE 1 MG/ML
INJECTION, SOLUTION INTRAMUSCULAR; INTRAVENOUS
Status: DISCONTINUED | OUTPATIENT
Start: 2023-02-28 | End: 2023-02-28 | Stop reason: HOSPADM

## 2023-02-28 RX ORDER — FENTANYL CITRATE 50 UG/ML
INJECTION, SOLUTION INTRAMUSCULAR; INTRAVENOUS
Status: DISCONTINUED | OUTPATIENT
Start: 2023-02-28 | End: 2023-02-28 | Stop reason: HOSPADM

## 2023-02-28 RX ORDER — METHYLPREDNISOLONE ACETATE 80 MG/ML
INJECTION, SUSPENSION INTRA-ARTICULAR; INTRALESIONAL; INTRAMUSCULAR; SOFT TISSUE
Status: DISCONTINUED | OUTPATIENT
Start: 2023-02-28 | End: 2023-02-28 | Stop reason: HOSPADM

## 2023-02-28 NOTE — H&P
HPI  Patient presenting for Procedure(s) (LRB):  L4/5 IL WILBERTO (with right paramedian approach) (N/A)     No health changes since previous encounter    Past Medical History:   Diagnosis Date    Alcohol abuse     Alcoholic hepatitis     Anemia of chronic disease     Arthritis     generialized    Cancer 12/26/2017    liver    Coagulopathy     Dyspnea on exertion 3/26/2020    Encounter for blood transfusion     End stage liver disease     History of alcohol abuse     Hyperlipidemia     Hypersomnia 9/11/2020    Hypertension     Hyponatremia     Liver cirrhosis     Liver transplant candidate 12/26/2017    Obesity (BMI 30-39.9) 1/5/2016    Sleep apnea      Past Surgical History:   Procedure Laterality Date    BILATERAL SALPINGO-OOPHORECTOMY (BSO) Bilateral 1986    with hysterectomy done for abnormal cells    BREAST BIOPSY Left     benign    BREAST LUMPECTOMY      patient is unsure of date or laterality    CHOLECYSTECTOMY      COLONOSCOPY N/A 12/01/2017    Procedure: COLONOSCOPY;  Surgeon: Filemon Fuentes MD;  [facility]  Service: Endoscopy;  Laterality: N/A;    COLONOSCOPY N/A 12/05/2017    Procedure: COLONOSCOPY;  Surgeon: José Chavira MD;  [facility]  Service: Endoscopy;  Laterality: N/A;    COLONOSCOPY N/A 12/12/2022    Procedure: COLONOSCOPY;  Surgeon: Gogo Brown MD;  Location: [facility] ENDO;  Service: Endoscopy;  Laterality: N/A;    EPIDURAL STEROID INJECTION INTO CERVICAL SPINE N/A 01/04/2022    Procedure: C5/6 IL WILBERTO;  Surgeon: Ras Caballero MD;  Location: [facility] PAIN MGT;  Service: Pain Management;  Laterality: N/A;    EXAMINATION UNDER ANESTHESIA N/A 02/18/2020    Procedure: EXAM UNDER ANESTHESIA;  Surgeon: Alden Horowitz MD;  Location: [facility] OR;  Service: General;  Laterality: N/A;    EXCISIONAL HEMORRHOIDECTOMY N/A 02/18/2020    Procedure: HEMORRHOIDECTOMY;  Surgeon: Alden Horowitz MD;  Location: [facility] OR;  Service: General;  Laterality: N/A;     HYSTERECTOMY  1986    due to abnormal paps - ovaries were removed    INJECTION OF ANESTHETIC AGENT AROUND PUDENDAL NERVE N/A 02/18/2020    Procedure: BLOCK, NERVE, PUDENDAL;  Surgeon: Alden Horowitz MD;  Location: [facility];  Service: General;  Laterality: N/A;    INJECTION OF ANESTHETIC AGENT INTO SACROILIAC JOINT Right 06/14/2019    Procedure: right Sacroiliac Joint Injection;  Surgeon: David Desai MD;  Location: [facility] PAIN MGT;  Service: Pain Management;  Laterality: Right;    INJECTION OF ANESTHETIC AGENT INTO SACROILIAC JOINT Bilateral 02/07/2020    Procedure: Bilateral GT bursa + Bilateral Sacroiliac Joint Injection;  Surgeon: Dvaid Desai MD;  Location: [facility] PAIN MGT;  Service: Pain Management;  Laterality: Bilateral;    INJECTION OF ANESTHETIC AGENT INTO SACROILIAC JOINT Bilateral 07/07/2020    Procedure: Bilateral GT bursa +SIJ injection;  Surgeon: David Desai MD;  Location: [facility] PAIN MGT;  Service: Pain Management;  Laterality: Bilateral;    INJECTION OF ANESTHETIC AGENT INTO SACROILIAC JOINT Right 10/21/2020    Procedure: Right piriformis + right SIJ + right GT bursa injection;  Surgeon: David Desai MD;  Location: [facility] PAIN MGT;  Service: Pain Management;  Laterality: Right;    INJECTION OF ANESTHETIC AGENT INTO SACROILIAC JOINT Bilateral 04/15/2021    Procedure: Bilateral SIJ + Bilateral Piriformis + Bilateral GT Bursa Injection;  Surgeon: Flaco Frazier MD;  Location: [facility] PAIN MGT;  Service: Pain Management;  Laterality: Bilateral;    INJECTION OF ANESTHETIC AGENT INTO SACROILIAC JOINT Bilateral 04/21/2022    Procedure: Bilateral SIJ + Bilateral Piriformis + Bilateral GT Bursa Injection;  Surgeon: Ras Caballero MD;  Location: [facility] PAIN MGT;  Service: Pain Management;  Laterality: Bilateral;    INJECTION OF JOINT Bilateral 02/07/2020    Procedure: Bilateral GT bursa + Bilateral Sacroiliac Joint Injection;  Surgeon: David Desai MD;  Location: [facility] PAIN MGT;  Service: Pain  Management;  Laterality: Bilateral;    INJECTION OF JOINT Bilateral 07/07/2020    Procedure: Bilateral GT bursa +SIJ injection;  Surgeon: David Desai MD;  Location: [facility] PAIN MGT;  Service: Pain Management;  Laterality: Bilateral;    INJECTION OF JOINT Right 10/21/2020    Procedure: Right piriformis + right SIJ + right GT bursa injection;  Surgeon: David Desai MD;  Location: [facility] PAIN MGT;  Service: Pain Management;  Laterality: Right;    INJECTION OF JOINT Bilateral 02/08/2021    Procedure: Bilateral GT Bursa Injection;  Surgeon: Anna Kaminski MD;  Location: [facility] PAIN MGT;  Service: Pain Management;  Laterality: Bilateral;    INJECTION OF JOINT Bilateral 04/15/2021    Procedure: Bilateral SIJ + Bilateral Piriformis + Bilateral GT Bursa Injection;  Surgeon: Flaco Frazier MD;  Location: [facility] PAIN MGT;  Service: Pain Management;  Laterality: Bilateral;    INJECTION OF JOINT Bilateral 04/21/2022    Procedure: Bilateral SIJ + Bilateral Piriformis + Bilateral GT Bursa Injection;  Surgeon: Ras Caballero MD;  Location: [facility] PAIN MGT;  Service: Pain Management;  Laterality: Bilateral;    INJECTION OF JOINT Right 10/11/2022    Procedure: right SIJ + right piriformis + right GT bursa injection;  Surgeon: Ras Caballero MD;  Location: [facility] PAIN MGT;  Service: Pain Management;  Laterality: Right;    INJECTION OF PIRIFORMIS MUSCLE Right 06/14/2019    Procedure: Right piriformis injection;  Surgeon: David Desai MD;  Location: [facility] PAIN MGT;  Service: Pain Management;  Laterality: Right;    INJECTION OF PIRIFORMIS MUSCLE Right 10/21/2020    Procedure: Right piriformis + right SIJ + right GT bursa injection;  Surgeon: David Desai MD;  Location: [facility] PAIN MGT;  Service: Pain Management;  Laterality: Right;    INJECTION OF PIRIFORMIS MUSCLE Bilateral 04/15/2021    Procedure: Bilateral SIJ + Bilateral Piriformis + Bilateral GT Bursa Injection;  Surgeon: Flaco Frazier MD;  Location: [facility] PAIN MGT;   Service: Pain Management;  Laterality: Bilateral;    INJECTION OF PIRIFORMIS MUSCLE Bilateral 04/21/2022    Procedure: Bilateral SIJ + Bilateral Piriformis + Bilateral GT Bursa Injection;  Surgeon: Ras Caballero MD;  Location: [facility] PAIN MGT;  Service: Pain Management;  Laterality: Bilateral;    LIVER TRANSPLANT  12/2017    SELECTIVE INJECTION OF ANESTHETIC AGENT AROUND LUMBAR SPINAL NERVE ROOT BY TRANSFORAMINAL APPROACH Bilateral 10/28/2021    Procedure: BLOCK, SPINAL NERVE ROOT, LUMBAR, SELECTIVE, TRANSFORAMINAL APPROACH bilateral L4-5 and Right Knee injection with RN IV sedation;  Surgeon: Flaco Frazier MD;  Location: [facility] PAIN MGT;  Service: Pain Management;  Laterality: Bilateral;    SELECTIVE INJECTION OF ANESTHETIC AGENT AROUND LUMBAR SPINAL NERVE ROOT BY TRANSFORAMINAL APPROACH Bilateral 08/18/2022    Procedure: Bilateral L4/5 TF WILBERTO;  Surgeon: Ras Caballero MD;  Location: [facility] PAIN MGT;  Service: Pain Management;  Laterality: Bilateral;     Review of patient's allergies indicates:  No Known Allergies     Current Facility-Administered Medications on File Prior to Encounter   Medication Dose Route Frequency Provider Last Rate Last Admin    ondansetron injection 4 mg  4 mg Intravenous Once PRN Ras Caballero MD         Current Outpatient Medications on File Prior to Encounter   Medication Sig Dispense Refill    amitriptyline (ELAVIL) 25 MG tablet Take 1-2 tablets (25-50 mg total) by mouth nightly as needed for Insomnia or Pain. 60 tablet 1    atorvastatin (LIPITOR) 40 MG tablet Take 1 tablet (40 mg total) by mouth every evening. 90 tablet 2    famotidine (PEPCID) 20 MG tablet Take 1 tablet (20 mg total) by mouth 2 (two) times a day. 90 tablet 3    furosemide (LASIX) 40 MG tablet TAKE 1 TABLET BY MOUTH EVERY DAY 90 tablet 5    losartan (COZAAR) 25 MG tablet Take 1 tablet (25 mg total) by mouth once daily. 90 tablet 3    NIFEdipine (PROCARDIA-XL) 60 MG (OSM) 24 hr tablet TAKE 1 TABLET(60 MG) BY MOUTH  "EVERY EVENING 90 tablet 3    pregabalin (LYRICA) 225 MG Cap Take 1 capsule (225 mg total) by mouth 2 (two) times daily. 60 capsule 2    tacrolimus (PROGRAF) 1 MG Cap TAKE 2 CAPSULES BY MOUTH EVERY MORNING AND 1 CAPSULE EVERY EVENING 90 capsule 11    cyclobenzaprine (FLEXERIL) 10 MG tablet TAKE 1/2 TO 1 TABLET BY MOUTH THREE TIMES DAILY AS NEEDED FOR MUSCLE SPASMS 90 tablet 1    DULoxetine (CYMBALTA) 20 MG capsule Take 1 capsule (20 mg total) by mouth once daily. 30 capsule 1    ergocalciferol (ERGOCALCIFEROL) 50,000 unit Cap Take 1 capsule (50,000 Units total) by mouth every 7 days. 12 capsule 3    estradioL (ESTRACE) 0.01 % (0.1 mg/gram) vaginal cream Insert 1 applicator full vaginally times 2 weeks and then twice a week 42.5 g 6    magnesium oxide (MAG-OX) 400 mg (241.3 mg magnesium) tablet Take 1 tablet (400 mg total) by mouth 2 (two) times daily. 60 tablet 2    natrexone tablet 4.5 mg Take 1 tablet (4.5 mg total) by mouth every evening. 30 tablet 2    pantoprazole (PROTONIX) 40 MG tablet TAKE 1 TABLET(40 MG) BY MOUTH EVERY DAY 90 tablet 1    potassium chloride (KLOR-CON) 10 MEQ TbSR Take 1 tablet (10 mEq total) by mouth 2 (two) times daily. 180 tablet 1        PMHx, PSHx, Allergies, Medications reviewed in epic    ROS negative except pain complaints in HPI    OBJECTIVE:    /77 (BP Location: Left arm, Patient Position: Sitting)   Pulse 91   Temp 97.3 °F (36.3 °C) (Temporal)   Resp 18   Ht 5' 5" (1.651 m)   Wt 100.6 kg (221 lb 12.5 oz)   LMP 01/01/1985 (Approximate) Comment: 1985  SpO2 96%   Breastfeeding No   BMI 36.91 kg/m²     PHYSICAL EXAMINATION:    GENERAL: Well appearing, in no acute distress, alert and oriented x3.  PSYCH:  Mood and affect appropriate.  SKIN: Skin color, texture, turgor normal, no rashes or lesions which will impact the procedure.  CV: RRR with palpation of the radial artery.  PULM: No evidence of respiratory difficulty, symmetric chest rise. Clear to auscultation.  NEURO: " Cranial nerves grossly intact.    Plan:    Proceed with procedure as planned Procedure(s) (LRB):  L4/5 IL WILBERTO (with right paramedian approach) (N/A)    Ras Caballero MD  02/28/2023

## 2023-02-28 NOTE — DISCHARGE INSTRUCTIONS

## 2023-02-28 NOTE — DISCHARGE SUMMARY
Discharge Note  Short Stay      SUMMARY     Admit Date: 2/28/2023    Attending Physician: Ras Caballero MD        Discharge Physician: Ras Caballero MD        Discharge Date: 2/28/2023 12:35 PM    Procedure(s) (LRB):  L4/5 IL WILBERTO (with right paramedian approach) (N/A)    Final Diagnosis: Right lumbar radiculopathy [M54.16]    Disposition: Home or self care    Patient Instructions:   Current Discharge Medication List        CONTINUE these medications which have NOT CHANGED    Details   amitriptyline (ELAVIL) 25 MG tablet Take 1-2 tablets (25-50 mg total) by mouth nightly as needed for Insomnia or Pain.  Qty: 60 tablet, Refills: 1    Associated Diagnoses: Insomnia secondary to chronic pain      atorvastatin (LIPITOR) 40 MG tablet Take 1 tablet (40 mg total) by mouth every evening.  Qty: 90 tablet, Refills: 2    Associated Diagnoses: Mixed hyperlipidemia      famotidine (PEPCID) 20 MG tablet Take 1 tablet (20 mg total) by mouth 2 (two) times a day.  Qty: 90 tablet, Refills: 3    Comments: -  Associated Diagnoses: Gastroesophageal reflux disease with esophagitis without hemorrhage      furosemide (LASIX) 40 MG tablet TAKE 1 TABLET BY MOUTH EVERY DAY  Qty: 90 tablet, Refills: 5    Comments: **Patient requests 90 days supply**  Associated Diagnoses: Prophylactic immunotherapy; Bilateral leg edema      losartan (COZAAR) 25 MG tablet Take 1 tablet (25 mg total) by mouth once daily.  Qty: 90 tablet, Refills: 3    Comments: -  Associated Diagnoses: Essential hypertension      NIFEdipine (PROCARDIA-XL) 60 MG (OSM) 24 hr tablet TAKE 1 TABLET(60 MG) BY MOUTH EVERY EVENING  Qty: 90 tablet, Refills: 3    Comments: -  Associated Diagnoses: Essential hypertension      pregabalin (LYRICA) 225 MG Cap Take 1 capsule (225 mg total) by mouth 2 (two) times daily.  Qty: 60 capsule, Refills: 2    Associated Diagnoses: Bilateral lumbar radiculopathy      tacrolimus (PROGRAF) 1 MG Cap TAKE 2 CAPSULES BY MOUTH EVERY MORNING AND 1  CAPSULE EVERY EVENING  Qty: 90 capsule, Refills: 11    Associated Diagnoses: Liver replaced by transplant      cyclobenzaprine (FLEXERIL) 10 MG tablet TAKE 1/2 TO 1 TABLET BY MOUTH THREE TIMES DAILY AS NEEDED FOR MUSCLE SPASMS  Qty: 90 tablet, Refills: 1    Associated Diagnoses: Piriformis muscle pain      DULoxetine (CYMBALTA) 20 MG capsule Take 1 capsule (20 mg total) by mouth once daily.  Qty: 30 capsule, Refills: 1    Associated Diagnoses: Chronic pain syndrome      ergocalciferol (ERGOCALCIFEROL) 50,000 unit Cap Take 1 capsule (50,000 Units total) by mouth every 7 days.  Qty: 12 capsule, Refills: 3    Comments: -  Associated Diagnoses: Vitamin D deficiency      estradioL (ESTRACE) 0.01 % (0.1 mg/gram) vaginal cream Insert 1 applicator full vaginally times 2 weeks and then twice a week  Qty: 42.5 g, Refills: 6    Associated Diagnoses: Vaginal dryness, menopausal      magnesium oxide (MAG-OX) 400 mg (241.3 mg magnesium) tablet Take 1 tablet (400 mg total) by mouth 2 (two) times daily.  Qty: 60 tablet, Refills: 2    Associated Diagnoses: Hypomagnesemia      natrexone tablet 4.5 mg Take 1 tablet (4.5 mg total) by mouth every evening.  Qty: 30 tablet, Refills: 2    Associated Diagnoses: Generalized pain      pantoprazole (PROTONIX) 40 MG tablet TAKE 1 TABLET(40 MG) BY MOUTH EVERY DAY  Qty: 90 tablet, Refills: 1    Associated Diagnoses: Gastroesophageal reflux disease with esophagitis without hemorrhage      potassium chloride (KLOR-CON) 10 MEQ TbSR Take 1 tablet (10 mEq total) by mouth 2 (two) times daily.  Qty: 180 tablet, Refills: 1                 Discharge Diagnosis: Right lumbar radiculopathy [M54.16]  Condition on Discharge: Stable with no complications to procedure   Diet on Discharge: Same as before.  Activity: as per instruction sheet.  Discharge to: Home with a responsible adult.  Follow up: 2-4 weeks       Please call the office at [phone] if you experience any weakness or loss of sensation, fever  > 101.5, pain uncontrolled with oral medications, persistent nausea/vomiting/or diarrhea, redness or drainage from the incisions, or any other worrisome concerns. If physician on call was not reached or could not communicate with our office for any reason please go to the nearest emergency department

## 2023-02-28 NOTE — OP NOTE
Lumbar Interlaminar Epidural Steroid Injection under Fluoroscopic Guidance.   Time-out taken to identify patient and procedure prior to starting the procedure.     02/28/2023    PROCEDURE: Interlaminar epidural steroid injection under fluoroscopic guidance.     Pre-Op diagnosis: Right lumbar radiculopathy [M54.16]    Post-Op diagnosis: Right lumbar radiculopathy [M54.16]    PHYSICIAN: Ras Caballero MD    ASSISTANTS: None     SEDATION: Conscious sedation provided by M.D    The patient was monitored with continuous pulse oximetry, EKG, and intermittent blood pressure monitors, immediately prior to administration of sedation.  The patient was hemodynamically stable throughout the entire process was responsive to voice, and breathing spontaneously.  Supplemental O2 was provided at 2L/min via nasal cannula.  Patient was comfortable for the duration of the procedure.     There was a total of 2mg IV Midazolam and 50mcg Fentanyl titrated for the procedure    Total sedation time was <10 minutes      ESTIMATED BLOOD LOSS: none.     COMPLICATIONS: none.     SPECIMENS: none    TECHNIQUE: With the patient laying in a prone position, the area was prepped and draped in the usual sterile fashion using ChloraPrep and a fenestrated drape. 1% lidocaine was given using a 27-gauge needle by raising a wheal and going down to the hub of the needle over the L4/5 interlaminar space.  The interlaminar space was then approached with a 3.5 inch 18-gauge Touhy needle was introduced under fluoroscopic guidance in the AP and Lateral view. Once the Ligamentum flavum was encountered loss of resistance to saline was used to enter the epidural space. With positive loss of resistance and negative CSF or Blood, 3mL contrast dye Omnipaque (300mg/ml) was injected to confirm placement and there was no vascular runoff. Then 1ml 80mg/ml Depomedrol + 1mL 0.25% Bupivicaine + 8mL preservative free normal saline was injected slowly. Displacement of the  radio opaque contrast after injection of the medication confirmed that the medication went into the area of the epidural space.  The patient tolerated the procedure well.       The patient was monitored after the procedure.   They were given post-procedure and discharge instructions to follow at home.  The patient was discharged in a stable condition.

## 2023-03-01 ENCOUNTER — TELEPHONE (OUTPATIENT)
Dept: INTERNAL MEDICINE | Facility: CLINIC | Age: 58
End: 2023-03-01
Payer: MEDICAID

## 2023-03-06 DIAGNOSIS — Z94.4 LIVER REPLACED BY TRANSPLANT: Primary | ICD-10-CM

## 2023-03-07 ENCOUNTER — PATIENT MESSAGE (OUTPATIENT)
Dept: PAIN MEDICINE | Facility: CLINIC | Age: 58
End: 2023-03-07
Payer: MEDICAID

## 2023-03-13 ENCOUNTER — PATIENT MESSAGE (OUTPATIENT)
Dept: PAIN MEDICINE | Facility: CLINIC | Age: 58
End: 2023-03-13
Payer: MEDICAID

## 2023-03-14 ENCOUNTER — OFFICE VISIT (OUTPATIENT)
Dept: PAIN MEDICINE | Facility: CLINIC | Age: 58
End: 2023-03-14
Payer: MEDICAID

## 2023-03-14 VITALS — RESPIRATION RATE: 17 BRPM

## 2023-03-14 DIAGNOSIS — M54.16 RIGHT LUMBAR RADICULOPATHY: Primary | ICD-10-CM

## 2023-03-14 DIAGNOSIS — G89.4 CHRONIC PAIN SYNDROME: Primary | ICD-10-CM

## 2023-03-14 DIAGNOSIS — M46.1 SACROILIITIS: ICD-10-CM

## 2023-03-14 PROCEDURE — 1159F PR MEDICATION LIST DOCUMENTED IN MEDICAL RECORD: ICD-10-PCS | Mod: CPTII,95,, | Performed by: PHYSICIAN ASSISTANT

## 2023-03-14 PROCEDURE — 1159F MED LIST DOCD IN RCRD: CPT | Mod: CPTII,95,, | Performed by: PHYSICIAN ASSISTANT

## 2023-03-14 PROCEDURE — 99214 PR OFFICE/OUTPT VISIT, EST, LEVL IV, 30-39 MIN: ICD-10-PCS | Mod: 95,,, | Performed by: PHYSICIAN ASSISTANT

## 2023-03-14 PROCEDURE — 1160F RVW MEDS BY RX/DR IN RCRD: CPT | Mod: CPTII,95,, | Performed by: PHYSICIAN ASSISTANT

## 2023-03-14 PROCEDURE — 1160F PR REVIEW ALL MEDS BY PRESCRIBER/CLIN PHARMACIST DOCUMENTED: ICD-10-PCS | Mod: CPTII,95,, | Performed by: PHYSICIAN ASSISTANT

## 2023-03-14 PROCEDURE — 99214 OFFICE O/P EST MOD 30 MIN: CPT | Mod: 95,,, | Performed by: PHYSICIAN ASSISTANT

## 2023-03-14 NOTE — PROGRESS NOTES
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 be helping some. Consider Increase.  - No NSAIDs due to h/o transplant.   - Anticoagulation use: None.     3. Rehabilitative: Encouraged regular exercise. Continue exercises and activities as tolerated. She went to PT, but she felt pain worse when she got home.    4. Diagnostic: None.     5. Consult/ Referral:   - Consider referral to Physiatry from [facility].  - Previously placed referral to Neurology for new onset headaches for a few months with associated dizziness & nausea.  She saw primary care who told her to ask our dept about the headaches. Per PCP note-believed to be occipital neuralgia. Headaches have resolved, so will hold off for now.    6. Follow up: in clinic in 2 weeks     - This condition does not require this patient to take time off of work, and the primary goal of our Pain Management services is to improve the patient's functional capacity.   - I discussed the risks, benefits, and alternatives to potential treatment options. All questions and concerns were fully addressed today in clinic.         Emilee Buitrago PA-C  Interventional Pain Management - [facility]    Disclaimer:  This note was prepared using voice recognition system and is likely to have sound alike errors that may have been overlooked even after proof reading.  Please call me with any questions.

## 2023-03-15 RX ORDER — TRAMADOL HYDROCHLORIDE 50 MG/1
25-50 TABLET ORAL EVERY 8 HOURS PRN
Qty: 21 TABLET | Refills: 0 | Status: SHIPPED | OUTPATIENT
Start: 2023-03-15 | End: 2023-04-06

## 2023-03-20 ENCOUNTER — TELEPHONE (OUTPATIENT)
Dept: INTERNAL MEDICINE | Facility: CLINIC | Age: 58
End: 2023-03-20
Payer: MEDICAID

## 2023-03-23 ENCOUNTER — PATIENT MESSAGE (OUTPATIENT)
Dept: INTERNAL MEDICINE | Facility: CLINIC | Age: 58
End: 2023-03-23
Payer: MEDICAID

## 2023-03-23 DIAGNOSIS — K21.00 GASTROESOPHAGEAL REFLUX DISEASE WITH ESOPHAGITIS WITHOUT HEMORRHAGE: ICD-10-CM

## 2023-03-24 ENCOUNTER — TELEPHONE (OUTPATIENT)
Dept: PHYSICAL MEDICINE AND REHAB | Facility: CLINIC | Age: 58
End: 2023-03-24
Payer: MEDICAID

## 2023-03-24 RX ORDER — FAMOTIDINE 20 MG/1
20 TABLET, FILM COATED ORAL 2 TIMES DAILY
Qty: 180 TABLET | Refills: 0 | Status: SHIPPED | OUTPATIENT
Start: 2023-03-24 | End: 2023-03-28 | Stop reason: SDUPTHER

## 2023-03-24 NOTE — TELEPHONE ENCOUNTER
No new care gaps identified.  [facility] Embedded Care Gaps. Reference number: [id number]. 3/24/2023   10:32:10 AM CDT

## 2023-03-24 NOTE — TELEPHONE ENCOUNTER
----- Message from Giuliana Colbert sent at 3/24/2023  8:41 AM CDT -----  Contact: Marcie Jack is needing a call back in regards to rescheduling her appt. She stated that she will not be able to make it today.Please give her a call back at [phone]

## 2023-03-24 NOTE — TELEPHONE ENCOUNTER
Refill Decision Note   Marcie Bazan  is requesting a refill authorization.  Brief Assessment and Rationale for Refill:  Approve     Medication Therapy Plan:       Medication Reconciliation Completed: No   Comments:     No Care Gaps recommended.     Note composed:10:32 AM 03/24/2023

## 2023-03-24 NOTE — TELEPHONE ENCOUNTER
This appears to be a request for a refill of famotidine. If so, please enter Refill Request and route it to [facility].    We an address any other issues at her next appointment with me on 3/28/2023.

## 2023-03-28 ENCOUNTER — OFFICE VISIT (OUTPATIENT)
Dept: INTERNAL MEDICINE | Facility: CLINIC | Age: 58
End: 2023-03-28
Payer: MEDICAID

## 2023-03-28 ENCOUNTER — E-CONSULT (OUTPATIENT)
Dept: GASTROENTEROLOGY | Facility: CLINIC | Age: 58
End: 2023-03-28
Payer: MEDICAID

## 2023-03-28 VITALS
HEART RATE: 84 BPM | WEIGHT: 221.56 LBS | BODY MASS INDEX: 36.91 KG/M2 | TEMPERATURE: 97 F | SYSTOLIC BLOOD PRESSURE: 136 MMHG | OXYGEN SATURATION: 96 % | DIASTOLIC BLOOD PRESSURE: 84 MMHG | HEIGHT: 65 IN

## 2023-03-28 DIAGNOSIS — G47.33 OSA (OBSTRUCTIVE SLEEP APNEA): ICD-10-CM

## 2023-03-28 DIAGNOSIS — E55.9 VITAMIN D DEFICIENCY: ICD-10-CM

## 2023-03-28 DIAGNOSIS — D69.6 THROMBOCYTOPENIA: ICD-10-CM

## 2023-03-28 DIAGNOSIS — I10 ESSENTIAL HYPERTENSION: Chronic | ICD-10-CM

## 2023-03-28 DIAGNOSIS — E87.6 DIURETIC-INDUCED HYPOKALEMIA: ICD-10-CM

## 2023-03-28 DIAGNOSIS — K21.00 GASTROESOPHAGEAL REFLUX DISEASE WITH ESOPHAGITIS WITHOUT HEMORRHAGE: ICD-10-CM

## 2023-03-28 DIAGNOSIS — E04.2 MULTINODULAR THYROID: ICD-10-CM

## 2023-03-28 DIAGNOSIS — E78.2 MIXED HYPERLIPIDEMIA: ICD-10-CM

## 2023-03-28 DIAGNOSIS — E66.01 CLASS 2 SEVERE OBESITY DUE TO EXCESS CALORIES WITH SERIOUS COMORBIDITY AND BODY MASS INDEX (BMI) OF 36.0 TO 36.9 IN ADULT: Chronic | ICD-10-CM

## 2023-03-28 DIAGNOSIS — R11.0 NAUSEA: Primary | ICD-10-CM

## 2023-03-28 DIAGNOSIS — R11.0 CHRONIC NAUSEA: Primary | Chronic | ICD-10-CM

## 2023-03-28 DIAGNOSIS — T50.2X5A DIURETIC-INDUCED HYPOKALEMIA: ICD-10-CM

## 2023-03-28 DIAGNOSIS — D50.0 IRON DEFICIENCY ANEMIA DUE TO CHRONIC BLOOD LOSS: ICD-10-CM

## 2023-03-28 DIAGNOSIS — T17.308A CHOKING, INITIAL ENCOUNTER: ICD-10-CM

## 2023-03-28 PROBLEM — E66.812 CLASS 2 SEVERE OBESITY DUE TO EXCESS CALORIES WITH SERIOUS COMORBIDITY AND BODY MASS INDEX (BMI) OF 36.0 TO 36.9 IN ADULT: Chronic | Status: ACTIVE | Noted: 2019-02-05

## 2023-03-28 PROCEDURE — 1159F MED LIST DOCD IN RCRD: CPT | Mod: CPTII,,, | Performed by: FAMILY MEDICINE

## 2023-03-28 PROCEDURE — 4010F ACE/ARB THERAPY RXD/TAKEN: CPT | Mod: CPTII,,, | Performed by: FAMILY MEDICINE

## 2023-03-28 PROCEDURE — 99214 OFFICE O/P EST MOD 30 MIN: CPT | Mod: S$PBB,,, | Performed by: FAMILY MEDICINE

## 2023-03-28 PROCEDURE — 99451 PR INTERPROF, PHONE/INTERNET/EHR, CONSULT, >= 5MINS: ICD-10-PCS | Mod: S$PBB,,, | Performed by: INTERNAL MEDICINE

## 2023-03-28 PROCEDURE — 3079F PR MOST RECENT DIASTOLIC BLOOD PRESSURE 80-89 MM HG: ICD-10-PCS | Mod: CPTII,,, | Performed by: FAMILY MEDICINE

## 2023-03-28 PROCEDURE — 3079F DIAST BP 80-89 MM HG: CPT | Mod: CPTII,,, | Performed by: FAMILY MEDICINE

## 2023-03-28 PROCEDURE — 99214 OFFICE O/P EST MOD 30 MIN: CPT | Mod: PBBFAC | Performed by: FAMILY MEDICINE

## 2023-03-28 PROCEDURE — 3075F PR MOST RECENT SYSTOLIC BLOOD PRESS GE 130-139MM HG: ICD-10-PCS | Mod: CPTII,,, | Performed by: FAMILY MEDICINE

## 2023-03-28 PROCEDURE — 99999 PR PBB SHADOW E&M-EST. PATIENT-LVL IV: ICD-10-PCS | Mod: PBBFAC,,, | Performed by: FAMILY MEDICINE

## 2023-03-28 PROCEDURE — 3008F PR BODY MASS INDEX (BMI) DOCUMENTED: ICD-10-PCS | Mod: CPTII,,, | Performed by: FAMILY MEDICINE

## 2023-03-28 PROCEDURE — 1160F PR REVIEW ALL MEDS BY PRESCRIBER/CLIN PHARMACIST DOCUMENTED: ICD-10-PCS | Mod: CPTII,,, | Performed by: FAMILY MEDICINE

## 2023-03-28 PROCEDURE — 3075F SYST BP GE 130 - 139MM HG: CPT | Mod: CPTII,,, | Performed by: FAMILY MEDICINE

## 2023-03-28 PROCEDURE — 4010F PR ACE/ARB THEARPY RXD/TAKEN: ICD-10-PCS | Mod: CPTII,,, | Performed by: FAMILY MEDICINE

## 2023-03-28 PROCEDURE — 1159F PR MEDICATION LIST DOCUMENTED IN MEDICAL RECORD: ICD-10-PCS | Mod: CPTII,,, | Performed by: FAMILY MEDICINE

## 2023-03-28 PROCEDURE — 1160F RVW MEDS BY RX/DR IN RCRD: CPT | Mod: CPTII,,, | Performed by: FAMILY MEDICINE

## 2023-03-28 PROCEDURE — 99451 NTRPROF PH1/NTRNET/EHR 5/>: CPT | Mod: S$PBB,,, | Performed by: INTERNAL MEDICINE

## 2023-03-28 PROCEDURE — 3008F BODY MASS INDEX DOCD: CPT | Mod: CPTII,,, | Performed by: FAMILY MEDICINE

## 2023-03-28 PROCEDURE — 99999 PR PBB SHADOW E&M-EST. PATIENT-LVL IV: CPT | Mod: PBBFAC,,, | Performed by: FAMILY MEDICINE

## 2023-03-28 PROCEDURE — 99214 PR OFFICE/OUTPT VISIT, EST, LEVL IV, 30-39 MIN: ICD-10-PCS | Mod: S$PBB,,, | Performed by: FAMILY MEDICINE

## 2023-03-28 RX ORDER — POTASSIUM CHLORIDE 750 MG/1
10 TABLET, EXTENDED RELEASE ORAL 2 TIMES DAILY
Qty: 180 TABLET | Refills: 1 | Status: SHIPPED | OUTPATIENT
Start: 2023-03-28 | End: 2023-11-03

## 2023-03-28 RX ORDER — LOSARTAN POTASSIUM 25 MG/1
25 TABLET ORAL DAILY
Qty: 90 TABLET | Refills: 3 | Status: SHIPPED | OUTPATIENT
Start: 2023-03-28 | End: 2024-03-12 | Stop reason: SDUPTHER

## 2023-03-28 RX ORDER — ONDANSETRON HYDROCHLORIDE 8 MG/1
8 TABLET, FILM COATED ORAL EVERY 12 HOURS PRN
Qty: 60 TABLET | Refills: 5 | Status: SHIPPED | OUTPATIENT
Start: 2023-03-28 | End: 2024-01-31 | Stop reason: ALTCHOICE

## 2023-03-28 RX ORDER — ERGOCALCIFEROL 1.25 MG/1
50000 CAPSULE ORAL
Qty: 12 CAPSULE | Refills: 3 | Status: SHIPPED | OUTPATIENT
Start: 2023-03-28 | End: 2023-07-13

## 2023-03-28 RX ORDER — ATORVASTATIN CALCIUM 40 MG/1
40 TABLET, FILM COATED ORAL NIGHTLY
Qty: 90 TABLET | Refills: 2 | Status: SHIPPED | OUTPATIENT
Start: 2023-03-28 | End: 2024-03-12 | Stop reason: SDUPTHER

## 2023-03-28 RX ORDER — PANTOPRAZOLE SODIUM 40 MG/1
40 TABLET, DELAYED RELEASE ORAL EVERY MORNING
Qty: 90 TABLET | Refills: 3 | Status: SHIPPED | OUTPATIENT
Start: 2023-03-28 | End: 2024-01-31 | Stop reason: DRUGHIGH

## 2023-03-28 RX ORDER — FAMOTIDINE 20 MG/1
20 TABLET, FILM COATED ORAL 2 TIMES DAILY
Qty: 180 TABLET | Refills: 3 | Status: SHIPPED | OUTPATIENT
Start: 2023-03-28 | End: 2024-03-12 | Stop reason: SDUPTHER

## 2023-03-28 RX ORDER — NIFEDIPINE 60 MG/1
TABLET, EXTENDED RELEASE ORAL
Qty: 90 TABLET | Refills: 3 | Status: SHIPPED | OUTPATIENT
Start: 2023-03-28 | End: 2023-06-23

## 2023-03-28 NOTE — ASSESSMENT & PLAN NOTE
Lab Results   Component Value Date    HGB 12.9 02/23/2023    HGB 12.5 01/18/2023    HGB 12.8 10/19/2022    HGB 12.1 09/21/2022    HCT 41.6 02/23/2023    HCT 40.7 01/18/2023    HCT 40.2 10/19/2022    HCT 41.4 09/21/2022     Lab Results   Component Value Date    FERRITIN 219 11/10/2021    FERRITIN 266 08/12/2021    FERRITIN 22 05/04/2021    IRON 67 11/10/2021    IRON 51 08/12/2021    IRON 24 (L) 05/04/2021    TRANSFERRIN 214 11/10/2021    TRANSFERRIN 204 08/12/2021    TRANSFERRIN 286 05/04/2021    TIBC 317 11/10/2021    TIBC 302 08/12/2021    TIBC 423 05/04/2021    FESATURATED 21 11/10/2021    FESATURATED 17 (L) 08/12/2021    FESATURATED 6 (L) 05/04/2021    [id number] 1722 (H) 05/04/2021    FOLATE 11.6 05/04/2021

## 2023-03-28 NOTE — CONSULTS
[facility]  Response for E-Consult     Patient Name: Marcie Bazan  MRN: [id number]  Primary Care Provider: ETHAN Munoz MD   Requesting Provider: ETHAN Munoz MD  Consults    Findings: N/a     I did not speak to the requesting provider verbally about this.     Total time of Consultation: 5 minute    Nausea could be secondary to nausea. She is on Elavil, Cymbalta and Prograf. All which can cause nausea. She could be maybe switch the first two medications but she will need to remain on Prograf.   It the above management does not work, can consider referring to GI clinic.       Percentage of time spent on verbal/written discussion: 25%     *This eConsult is based on the clinical data available to me and is furnished without benefit of a physical examination. The eConsult will need to be interpreted in light of any clinical issues or changes in patient status not available to me at the time of filing this eConsults. Significant changes in patient condition or level of acuity should result in immediate formal consultation and reevaluation. Please alert me if you have further questions.    Thank you for your consult.     Marco Antonio Louis MD  [facility]

## 2023-03-28 NOTE — ASSESSMENT & PLAN NOTE
BP Readings from Last 6 Encounters:   03/28/23 136/84   02/28/23 (!) 144/76   12/12/22 123/70   11/09/22 126/73   10/24/22 130/86   10/19/22 127/77      Last 5 Patient Entered Readings                Current 30 Day Average: 132/77  Recent Readings 3/27/2023 3/25/2023 3/23/2023 3/22/2023 3/21/2023   SBP (mmHg) 142 119 178 118 136   DBP (mmHg) 91 83 77 72 74   Pulse 96 82 71 75 79               Lab Results   Component Value Date    EGFRNORACEVR >60.0 02/23/2023    CREATININE 1.0 02/23/2023    BUN 13 02/23/2023    K 3.8 02/23/2023     02/23/2023     02/23/2023     Results for orders placed or performed during the hospital encounter of 07/29/21   EKG 12-lead    Collection Time: 07/29/21 10:33 AM    Narrative    Test Reason : R06.02,    Vent. Rate : 088 BPM     Atrial Rate : 088 BPM     P-R Int : 112 ms          QRS Dur : 078 ms      QT Int : 360 ms       P-R-T Axes : 026 -05 055 degrees     QTc Int : 435 ms    Normal sinus rhythm  Possible Anterior infarct (cited on or before 29-JUL-2021)  Abnormal ECG  When compared with ECG of 27-MAR-2020 11:37,  Previous ECG has undetermined rhythm, needs review  Questionable change in The axis  Non-specific change in ST segment in Inferior leads  T wave inversion no longer evident in Inferior leads  Nonspecific T wave abnormality no longer evident in Anterior leads  Confirmed by RHODA HIDALGO MD ([id number]) on 7/29/2021 2:36:45 PM    Referred By: [email or web address]   SELF           Confirmed By:RHODA HIDALGO MD

## 2023-03-28 NOTE — ASSESSMENT & PLAN NOTE
Lab Results   Component Value Date    TSH 1.767 05/04/2021    TSH 1.014 12/22/2020    TSH 2.237 06/08/2020    FREET4 0.99 12/22/2020    FREET4 0.74 06/08/2020    FREET4 0.97 03/26/2020

## 2023-03-28 NOTE — PROGRESS NOTES
"OFFICE VISIT 3/28/23  7:30 AM CDT    Subjective   CHIEF COMPLAINT: Follow-up    Unless noted differently below, chronic conditions are represented as and appear to be compensated/controlled and stable. She complains of chronic nausea, as described below, but no other new complaints or concerns reported.     Review of Systems   Constitutional:  Negative for chills, diaphoresis and fever.   Respiratory:  Negative for cough, chest tightness and shortness of breath.    Cardiovascular:  Negative for chest pain.   Gastrointestinal:  Positive for change in bowel habit, nausea and change in bowel habit. Negative for abdominal distention, abdominal pain and vomiting.       Assessment and Plan   1. Chronic nausea  Assessment & Plan:  She reports chronic nausea on majority of days for well over a year. No emesis. No abdominal pain. No indigestion. We discussed differential diagnosis. Medication side-effect? PLAN: E-consult GI.    Orders:  -     ondansetron (ZOFRAN) 8 MG tablet; Take 1 tablet (8 mg total) by mouth every 12 (twelve) hours as needed for Nausea.  Dispense: 60 tablet; Refill: 5  -     E-Consult to Gastroenterology    2. Class 2 severe obesity due to excess calories with serious comorbidity and body mass index (BMI) of 36.0 to 36.9 in adult  Assessment & Plan:  Wt Readings from Last 6 Encounters:   03/28/23 100.5 kg (221 lb 9 oz)   02/28/23 100.6 kg (221 lb 12.5 oz)   12/12/22 100.3 kg (221 lb 1.9 oz)   11/09/22 100.4 kg (221 lb 5.5 oz)   10/24/22 100.5 kg (221 lb 9 oz)   10/20/22 101.2 kg (223 lb 1.7 oz)     Estimated body mass index is 36.87 kg/m² as calculated from the following:    Height as of this encounter: 5' 5" (1.651 m).    Weight as of this encounter: 100.5 kg (221 lb 9 oz).     Orders:  -     Hemoglobin A1C; Future; Expected date: 03/28/2023    3. Iron deficiency anemia due to chronic blood loss  Assessment & Plan:  Lab Results   Component Value Date    HGB 12.9 02/23/2023    HGB 12.5 01/18/2023    HGB " 12.8 10/19/2022    HGB 12.1 09/21/2022    HCT 41.6 02/23/2023    HCT 40.7 01/18/2023    HCT 40.2 10/19/2022    HCT 41.4 09/21/2022     Lab Results   Component Value Date    FERRITIN 219 11/10/2021    FERRITIN 266 08/12/2021    FERRITIN 22 05/04/2021    IRON 67 11/10/2021    IRON 51 08/12/2021    IRON 24 (L) 05/04/2021    TRANSFERRIN 214 11/10/2021    TRANSFERRIN 204 08/12/2021    TRANSFERRIN 286 05/04/2021    TIBC 317 11/10/2021    TIBC 302 08/12/2021    TIBC 423 05/04/2021    FESATURATED 21 11/10/2021    FESATURATED 17 (L) 08/12/2021    FESATURATED 6 (L) 05/04/2021    [id number] 1722 (H) 05/04/2021    FOLATE 11.6 05/04/2021          4. Thrombocytopenia  Assessment & Plan:  Lab Results   Component Value Date     (L) 02/23/2023     (L) 01/18/2023     10/19/2022    PLT 92 (L) 09/21/2022     Lab Results   Component Value Date    WBC 9.09 02/23/2023    HGB 12.9 02/23/2023    HCT 41.6 02/23/2023    MCV 81 (L) 02/23/2023     (L) 02/23/2023            5. Gastroesophageal reflux disease with esophagitis without hemorrhage  -     pantoprazole (PROTONIX) 40 MG tablet; Take 1 tablet (40 mg total) by mouth every morning.  Dispense: 90 tablet; Refill: 3  -     famotidine (PEPCID) 20 MG tablet; Take 1 tablet (20 mg total) by mouth 2 (two) times a day.  Dispense: 180 tablet; Refill: 3    6. Essential hypertension  Assessment & Plan:  BP Readings from Last 6 Encounters:   03/28/23 136/84   02/28/23 (!) 144/76   12/12/22 123/70   11/09/22 126/73   10/24/22 130/86   10/19/22 127/77      Last 5 Patient Entered Readings                Current 30 Day Average: 132/77  Recent Readings 3/27/2023 3/25/2023 3/23/2023 3/22/2023 3/21/2023   SBP (mmHg) 142 119 178 118 136   DBP (mmHg) 91 83 77 72 74   Pulse 96 82 71 75 79                 Lab Results   Component Value Date    EGFRNORACEVR >60.0 02/23/2023    CREATININE 1.0 02/23/2023    BUN 13 02/23/2023    K 3.8 02/23/2023     02/23/2023     02/23/2023      Results for orders placed or performed during the hospital encounter of 07/29/21   EKG 12-lead    Collection Time: 07/29/21 10:33 AM    Narrative    Test Reason : R06.02,    Vent. Rate : 088 BPM     Atrial Rate : 088 BPM     P-R Int : 112 ms          QRS Dur : 078 ms      QT Int : 360 ms       P-R-T Axes : 026 -05 055 degrees     QTc Int : 435 ms    Normal sinus rhythm  Possible Anterior infarct (cited on or before 29-JUL-2021)  Abnormal ECG  When compared with ECG of 27-MAR-2020 11:37,  Previous ECG has undetermined rhythm, needs review  Questionable change in The axis  Non-specific change in ST segment in Inferior leads  T wave inversion no longer evident in Inferior leads  Nonspecific T wave abnormality no longer evident in Anterior leads  Confirmed by RHODA HIDALGO MD ([id number]) on 7/29/2021 2:36:45 PM    Referred By: [email or web address]   SELF           Confirmed By:RHODA HIDALGO MD         Orders:  -     NIFEdipine (PROCARDIA-XL) 60 MG (OSM) 24 hr tablet; TAKE 1 TABLET(60 MG) BY MOUTH EVERY EVENING  Dispense: 90 tablet; Refill: 3  -     losartan (COZAAR) 25 MG tablet; Take 1 tablet (25 mg total) by mouth once daily.  Dispense: 90 tablet; Refill: 3    7. Vitamin D deficiency  -     ergocalciferol (ERGOCALCIFEROL) 50,000 unit Cap; Take 1 capsule (50,000 Units total) by mouth every 7 days.  Dispense: 12 capsule; Refill: 3    8. Mixed hyperlipidemia  Assessment & Plan:  Lab Results   Component Value Date    CHOL 128 12/13/2021    CHOL 129 04/27/2020    TRIG 179 (H) 12/13/2021    TRIG 128 04/27/2020    HDL 37 (L) 12/13/2021    HDL 34 (L) 04/27/2020    LDLCALC 55.2 (L) 12/13/2021    LDLCALC 69.4 04/27/2020    NONHDLCHOL 91 12/13/2021    NONHDLCHOL 95 04/27/2020    AST 10 02/23/2023    ALT 10 02/23/2023     The ASCVD Risk score (Jaison DK, et al., 2019) failed to calculate for the following reasons:    The valid total cholesterol range is 130 to 320 mg/dL     Orders:  -     atorvastatin (LIPITOR) 40 MG tablet; Take 1  tablet (40 mg total) by mouth every evening.  Dispense: 90 tablet; Refill: 2  -     Lipid Panel; Future; Expected date: 03/28/2023    9. Choking, initial encounter  Assessment & Plan:  Essentially unchanged, except now sometimes will occur during sleep. She uses CPAP for BRANNON.      10. BRANNON (obstructive sleep apnea)    11. Multinodular thyroid  Overview:  US SOFT TISSUE HEAD NECK THYROID 06/08/2020  FINDINGS: Overall gland volume measures 6.5 cc. The right lobe measures 3.8 x 1.4 x 1.2 cm. Left lobe measures 3.6 x 1.3 x 1.3 cm. There is a colloid cystic nodule measuring 5 mm in the upper right lobe. There is a solid heterogeneous nodule measuring up to 8 mm in the midportion of the right lobe. Within the inferior portion of the right lobe there is a 1.6 cm solid nodule which is isoechoic to the gland. Within the isthmus there is also a 1.6 cm nodule with similar characteristics. Per ACR TI rads criteria, follow-up in 1, 3, and 5 years is suggested for the aforementioned nodules.  Nodules are seen in the left lobe as well. For example, there is a solid isoechoic nodule with hypoechoic halo in the midportion of the gland that measures up to 1.4 cm. A mixed cystic and solid nodule seen in the lower portion of the left lobe measuring just under 1 cm.  IMPRESSION: Right lower lobe in isthmus nodules requiring follow-up as above. No nodule meeting criteria for fine-needle aspiration at this time.    Assessment & Plan:  Lab Results   Component Value Date    TSH 1.767 05/04/2021    TSH 1.014 12/22/2020    TSH 2.237 06/08/2020    FREET4 0.99 12/22/2020    FREET4 0.74 06/08/2020    FREET4 0.97 03/26/2020       Orders:  -     US Soft Tissue Head Neck Thyroid; Future; Expected date: 03/28/2023  -     TSH; Future; Expected date: 03/28/2023    12. Diuretic-induced hypokalemia  -     potassium chloride (KLOR-CON) 10 MEQ TbSR; Take 1 tablet (10 mEq total) by mouth 2 (two) times daily.  Dispense: 180 tablet; Refill: 1    Unless noted  "herein, any chronic conditions are represented as and appear stable, and no other significant complaints or concerns were reported.    Follow up for virtual visit, review test results, discuss treatment plan.   Future Appointments   Date Time Provider Department Center   4/4/2023  9:00 AM Emilee Buitrago PA-C [facility] IN [facility] Medical C   4/13/2023  9:00 AM Melanie Henry MD [facility] PHYS [place]   5/16/2023  2:30 PM Jaskaran Hwang MD [facility] [place]   5/17/2023  7:30 AM Lynn Chauhan NP [facility] OBGYN  Medical C   5/22/2023  8:05 AM LABORATORY, [facility] HG LAB [facility]   9/13/2023 12:40 PM Elizabeth Lejeune, NP [facility] SLEEP [facility]     Assessment and Plan     Objective   Vitals:    03/28/23 0740 03/28/23 0754   BP: (!) 140/78 136/84   BP Location: Left arm Left arm   Patient Position: Sitting Sitting   BP Method: Large (Manual) Large (Manual)   Pulse: 84    Temp: 97.2 °F (36.2 °C)    TempSrc: Tympanic    SpO2: 96%    Weight: 100.5 kg (221 lb 9 oz)    Height: 5' 5" (1.651 m)    Physical Exam  Vitals reviewed.   Constitutional:       General: She is not in acute distress.     Appearance: Normal appearance. She is not ill-appearing, toxic-appearing or diaphoretic.   HENT:      Head: Normocephalic and atraumatic.   Eyes:      General: No scleral icterus.     Conjunctiva/sclera: Conjunctivae normal.   Cardiovascular:      Rate and Rhythm: Normal rate and regular rhythm.   Pulmonary:      Effort: Pulmonary effort is normal.      Breath sounds: Normal breath sounds.   Abdominal:      Palpations: Abdomen is soft.      Tenderness: There is no abdominal tenderness.   Skin:     General: Skin is warm and dry.   Neurological:      Mental Status: She is alert and oriented to person, place, and time. Mental status is at baseline.   Psychiatric:         Mood and Affect: Mood normal.         Behavior: Behavior normal.         Judgment: Judgment normal.        Documentation entered by me for this encounter may have been " "done in part using speech-recognition technology. Although I have made an effort to ensure accuracy, "sound like" errors may exist and should be interpreted in context.  "

## 2023-03-28 NOTE — ASSESSMENT & PLAN NOTE
Lab Results   Component Value Date     (L) 02/23/2023     (L) 01/18/2023     10/19/2022    PLT 92 (L) 09/21/2022     Lab Results   Component Value Date    WBC 9.09 02/23/2023    HGB 12.9 02/23/2023    HCT 41.6 02/23/2023    MCV 81 (L) 02/23/2023     (L) 02/23/2023

## 2023-03-28 NOTE — ASSESSMENT & PLAN NOTE
"Wt Readings from Last 6 Encounters:   03/28/23 100.5 kg (221 lb 9 oz)   02/28/23 100.6 kg (221 lb 12.5 oz)   12/12/22 100.3 kg (221 lb 1.9 oz)   11/09/22 100.4 kg (221 lb 5.5 oz)   10/24/22 100.5 kg (221 lb 9 oz)   10/20/22 101.2 kg (223 lb 1.7 oz)     Estimated body mass index is 36.87 kg/m² as calculated from the following:    Height as of this encounter: 5' 5" (1.651 m).    Weight as of this encounter: 100.5 kg (221 lb 9 oz).   "

## 2023-03-28 NOTE — ASSESSMENT & PLAN NOTE
She reports chronic nausea on majority of days for well over a year. No emesis. No abdominal pain. No indigestion. We discussed differential diagnosis. Medication side-effect? PLAN: E-consult GI.

## 2023-04-04 ENCOUNTER — PATIENT MESSAGE (OUTPATIENT)
Dept: PAIN MEDICINE | Facility: CLINIC | Age: 58
End: 2023-04-04

## 2023-04-04 ENCOUNTER — OFFICE VISIT (OUTPATIENT)
Dept: PAIN MEDICINE | Facility: CLINIC | Age: 58
End: 2023-04-04
Payer: MEDICAID

## 2023-04-04 VITALS
HEIGHT: 65 IN | SYSTOLIC BLOOD PRESSURE: 143 MMHG | BODY MASS INDEX: 36.8 KG/M2 | HEART RATE: 90 BPM | DIASTOLIC BLOOD PRESSURE: 83 MMHG | WEIGHT: 220.88 LBS

## 2023-04-04 DIAGNOSIS — M54.16 RIGHT LUMBAR RADICULOPATHY: ICD-10-CM

## 2023-04-04 DIAGNOSIS — G89.4 CHRONIC PAIN SYNDROME: ICD-10-CM

## 2023-04-04 DIAGNOSIS — M70.61 GREATER TROCHANTERIC BURSITIS, RIGHT: ICD-10-CM

## 2023-04-04 DIAGNOSIS — R29.898 COMPLAINTS OF LEG WEAKNESS: ICD-10-CM

## 2023-04-04 DIAGNOSIS — G57.01 PIRIFORMIS SYNDROME, RIGHT: ICD-10-CM

## 2023-04-04 DIAGNOSIS — G89.29 ACUTE EXACERBATION OF CHRONIC LOW BACK PAIN: Primary | ICD-10-CM

## 2023-04-04 DIAGNOSIS — M53.3 SACROILIAC JOINT DYSFUNCTION: ICD-10-CM

## 2023-04-04 DIAGNOSIS — M54.50 ACUTE EXACERBATION OF CHRONIC LOW BACK PAIN: Primary | ICD-10-CM

## 2023-04-04 PROCEDURE — 3077F PR MOST RECENT SYSTOLIC BLOOD PRESSURE >= 140 MM HG: ICD-10-PCS | Mod: CPTII,,, | Performed by: PHYSICIAN ASSISTANT

## 2023-04-04 PROCEDURE — 3008F BODY MASS INDEX DOCD: CPT | Mod: CPTII,,, | Performed by: PHYSICIAN ASSISTANT

## 2023-04-04 PROCEDURE — 3079F DIAST BP 80-89 MM HG: CPT | Mod: CPTII,,, | Performed by: PHYSICIAN ASSISTANT

## 2023-04-04 PROCEDURE — 1160F RVW MEDS BY RX/DR IN RCRD: CPT | Mod: CPTII,,, | Performed by: PHYSICIAN ASSISTANT

## 2023-04-04 PROCEDURE — 99999 PR PBB SHADOW E&M-EST. PATIENT-LVL IV: CPT | Mod: PBBFAC,,, | Performed by: PHYSICIAN ASSISTANT

## 2023-04-04 PROCEDURE — 1160F PR REVIEW ALL MEDS BY PRESCRIBER/CLIN PHARMACIST DOCUMENTED: ICD-10-PCS | Mod: CPTII,,, | Performed by: PHYSICIAN ASSISTANT

## 2023-04-04 PROCEDURE — 4010F ACE/ARB THERAPY RXD/TAKEN: CPT | Mod: CPTII,,, | Performed by: PHYSICIAN ASSISTANT

## 2023-04-04 PROCEDURE — 1159F MED LIST DOCD IN RCRD: CPT | Mod: CPTII,,, | Performed by: PHYSICIAN ASSISTANT

## 2023-04-04 PROCEDURE — 96372 THER/PROPH/DIAG INJ SC/IM: CPT | Mod: PBBFAC

## 2023-04-04 PROCEDURE — 99214 OFFICE O/P EST MOD 30 MIN: CPT | Mod: PBBFAC | Performed by: PHYSICIAN ASSISTANT

## 2023-04-04 PROCEDURE — 1159F PR MEDICATION LIST DOCUMENTED IN MEDICAL RECORD: ICD-10-PCS | Mod: CPTII,,, | Performed by: PHYSICIAN ASSISTANT

## 2023-04-04 PROCEDURE — 99999 PR PBB SHADOW E&M-EST. PATIENT-LVL IV: ICD-10-PCS | Mod: PBBFAC,,, | Performed by: PHYSICIAN ASSISTANT

## 2023-04-04 PROCEDURE — 99214 PR OFFICE/OUTPT VISIT, EST, LEVL IV, 30-39 MIN: ICD-10-PCS | Mod: S$PBB,,, | Performed by: PHYSICIAN ASSISTANT

## 2023-04-04 PROCEDURE — 4010F PR ACE/ARB THEARPY RXD/TAKEN: ICD-10-PCS | Mod: CPTII,,, | Performed by: PHYSICIAN ASSISTANT

## 2023-04-04 PROCEDURE — 3079F PR MOST RECENT DIASTOLIC BLOOD PRESSURE 80-89 MM HG: ICD-10-PCS | Mod: CPTII,,, | Performed by: PHYSICIAN ASSISTANT

## 2023-04-04 PROCEDURE — 3077F SYST BP >= 140 MM HG: CPT | Mod: CPTII,,, | Performed by: PHYSICIAN ASSISTANT

## 2023-04-04 PROCEDURE — 99214 OFFICE O/P EST MOD 30 MIN: CPT | Mod: S$PBB,,, | Performed by: PHYSICIAN ASSISTANT

## 2023-04-04 PROCEDURE — 3008F PR BODY MASS INDEX (BMI) DOCUMENTED: ICD-10-PCS | Mod: CPTII,,, | Performed by: PHYSICIAN ASSISTANT

## 2023-04-04 RX ORDER — METHYLPREDNISOLONE ACETATE 40 MG/ML
40 INJECTION, SUSPENSION INTRA-ARTICULAR; INTRALESIONAL; INTRAMUSCULAR; SOFT TISSUE
Status: COMPLETED | OUTPATIENT
Start: 2023-04-04 | End: 2023-04-04

## 2023-04-04 RX ADMIN — METHYLPREDNISOLONE ACETATE 40 MG: 40 INJECTION, SUSPENSION INTRA-ARTICULAR; INTRALESIONAL; INTRAMUSCULAR; SOFT TISSUE at 09:04

## 2023-04-04 NOTE — PROGRESS NOTES
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 on 02/08/2021 with 10% pain relief with Dr. Kaminski.  She feels pain is worse than prior to the procedure.  - S/p right SIJ + piriformis + GT bursa injection on 10/21/20 with 90% pain relief.     2. Pharmacologic:   - Continue bridge of tramadol 50mg (21  tabs - 7 day supply) to take 1/2 to 1 tab for severe pain.    - Refill Lyrica 225mg BID, amitriptyline 25-50mg QHS (Increased at previous visit), Flexeril 10mg to take 1/2 to 1 tab TID PRN.  - Refill Cymbalta 20 mg QD - started at previous visit, which was previously helping some. Consider Increase.  - Consider retrying naltrexone -- although too expensive in the past.  - No NSAIDs due to h/o transplant.   - Anticoagulation use: None.     3. Rehabilitative: Encouraged regular exercise. Continue exercises and activities as tolerated. She went to PT, but she felt pain worse when she got home.    4. Diagnostic: Consider BLE EMG/NCS if pain not improved after procedure.    5. Consult/ Referral:   - Consider referral to Physiatry from EMG/NCS.  - Previously placed referral to Neurology for new onset headaches for a few months with associated dizziness & nausea.  She saw primary care who told her to ask our dept about the headaches. Per PCP note-believed to be occipital neuralgia. Headaches have resolved, so will hold off for now.    6. Follow up: 4 weeks post-procedure - virtual visit     - This condition does not require  this patient to take time off of work, and the primary goal of our Pain Management services is to improve the patient's functional capacity.   - I discussed the risks, benefits, and alternatives to potential treatment options. All questions and concerns were fully addressed today in clinic.         Emilee Buitrago PA-C  Interventional Pain Management - [facility]    Disclaimer:  This note was prepared using voice recognition system and is likely to have sound alike errors that may have been overlooked even after proof reading.  Please call me with any questions.

## 2023-04-06 NOTE — PRE-PROCEDURE INSTRUCTIONS
Spoke with patient regarding procedure scheduled on 4.18     Arrival time 0630     Has patient been sick with fever or on antibiotics within the last 7 days? No     Does the patient have any open wounds, sores or rashes? No     Does the patient have any recent fractures? no     Has patient received a vaccination within the last 7 days? No     Received the COVID vaccination?      Has the patient stopped all medications as directed? na     Does patient have a pacemaker and or defibrillator? no     Does the patient have a ride to and from procedure and someone reliable to remain with patient? son     Is the patient diabetic? no     Does the patient have sleep apnea? Or use O2 at home? tien cpap     Is the patient receiving sedation?      Is the patient instructed to remain NPO beginning at midnight the night before their procedure? yes     Procedure location confirmed with patient? Yes     Covid- Denies signs/symptoms. Instructed to notify PAT/MD if any changes.

## 2023-04-12 ENCOUNTER — PATIENT MESSAGE (OUTPATIENT)
Dept: INTERNAL MEDICINE | Facility: CLINIC | Age: 58
End: 2023-04-12
Payer: MEDICAID

## 2023-04-12 NOTE — TELEPHONE ENCOUNTER
Gastroenterology E-Consult Follow-up  Marcie Scott.    I consulted one of our gastroenterologists about your nausea.    Here is a summary of their findings and recommendations.    FINDINGS: Chronic nausea could be secondary to medications. She is on Elavil, Cymbalta and Prograf, all which can cause nausea.  RECOMMENDATIONS: She need to remain on Prograf. She can discuss changing the Elavil or Cymbalta with the provider that prescribed those medicines. If nauseas persist, she can discuss this with her transplant team. They can schedule her for a formal GI consult if needed.    Thanks for letting me care for you, and thanks for trusting [facility] with your healthcare needs.    Sincerely,    KATJA Munoz MD   #[id number]

## 2023-04-13 ENCOUNTER — TELEPHONE (OUTPATIENT)
Dept: PHYSICAL MEDICINE AND REHAB | Facility: CLINIC | Age: 58
End: 2023-04-13
Payer: MEDICAID

## 2023-04-13 NOTE — TELEPHONE ENCOUNTER
----- Message from Lucas Regan sent at 4/13/2023  7:26 AM CDT -----  Contact: cheryl  Patient is requesting a call to reschedule her appointment. She stated that she is sick. Please call her back at [phone].      Thanks  DD

## 2023-04-17 ENCOUNTER — HOSPITAL ENCOUNTER (OUTPATIENT)
Dept: RADIOLOGY | Facility: HOSPITAL | Age: 58
Discharge: HOME | End: 2023-04-17
Attending: FAMILY MEDICINE
Payer: MEDICAID

## 2023-04-17 DIAGNOSIS — E04.2 MULTINODULAR THYROID: ICD-10-CM

## 2023-04-17 PROCEDURE — 76536 US EXAM OF HEAD AND NECK: CPT | Mod: TC

## 2023-04-17 PROCEDURE — 76536 US EXAM OF HEAD AND NECK: CPT | Mod: 26,,, | Performed by: RADIOLOGY

## 2023-04-17 PROCEDURE — 76536 US SOFT TISSUE HEAD NECK THYROID: ICD-10-PCS | Mod: 26,,, | Performed by: RADIOLOGY

## 2023-04-18 ENCOUNTER — PATIENT MESSAGE (OUTPATIENT)
Dept: INTERNAL MEDICINE | Facility: CLINIC | Age: 58
End: 2023-04-18
Payer: MEDICAID

## 2023-04-18 ENCOUNTER — HOSPITAL ENCOUNTER (OUTPATIENT)
Facility: HOSPITAL | Age: 58
Discharge: HOME | End: 2023-04-18
Attending: PHYSICAL MEDICINE & REHABILITATION | Admitting: PHYSICAL MEDICINE & REHABILITATION
Payer: MEDICAID

## 2023-04-18 VITALS
DIASTOLIC BLOOD PRESSURE: 72 MMHG | WEIGHT: 221.31 LBS | BODY MASS INDEX: 36.87 KG/M2 | RESPIRATION RATE: 18 BRPM | TEMPERATURE: 98 F | HEIGHT: 65 IN | OXYGEN SATURATION: 95 % | HEART RATE: 69 BPM | SYSTOLIC BLOOD PRESSURE: 118 MMHG

## 2023-04-18 DIAGNOSIS — M46.1 SACROILIITIS: ICD-10-CM

## 2023-04-18 DIAGNOSIS — M53.3 SACROILIAC JOINT DYSFUNCTION: Primary | ICD-10-CM

## 2023-04-18 PROCEDURE — 20610 DRAIN/INJ JOINT/BURSA W/O US: CPT | Mod: RT,,, | Performed by: PHYSICAL MEDICINE & REHABILITATION

## 2023-04-18 PROCEDURE — 63600175 PHARM REV CODE 636 W HCPCS: Performed by: PHYSICAL MEDICINE & REHABILITATION

## 2023-04-18 PROCEDURE — 27096 INJECT SACROILIAC JOINT: CPT | Mod: 59,RT,, | Performed by: PHYSICAL MEDICINE & REHABILITATION

## 2023-04-18 PROCEDURE — 20552 PR INJECT TRIGGER POINT, 1 OR 2: ICD-10-PCS | Mod: 59,,, | Performed by: PHYSICAL MEDICINE & REHABILITATION

## 2023-04-18 PROCEDURE — 25500020 PHARM REV CODE 255: Performed by: PHYSICAL MEDICINE & REHABILITATION

## 2023-04-18 PROCEDURE — 20552 NJX 1/MLT TRIGGER POINT 1/2: CPT | Performed by: PHYSICAL MEDICINE & REHABILITATION

## 2023-04-18 PROCEDURE — 27096 INJECT SACROILIAC JOINT: CPT | Mod: RT | Performed by: PHYSICAL MEDICINE & REHABILITATION

## 2023-04-18 PROCEDURE — 25000003 PHARM REV CODE 250: Performed by: PHYSICAL MEDICINE & REHABILITATION

## 2023-04-18 PROCEDURE — 27096 PR INJECTION,SACROILIAC JOINT: ICD-10-PCS | Mod: 59,RT,, | Performed by: PHYSICAL MEDICINE & REHABILITATION

## 2023-04-18 PROCEDURE — 20552 NJX 1/MLT TRIGGER POINT 1/2: CPT | Mod: 59,,, | Performed by: PHYSICAL MEDICINE & REHABILITATION

## 2023-04-18 PROCEDURE — 20610 PR DRAIN/INJECT LARGE JOINT/BURSA: ICD-10-PCS | Mod: RT,,, | Performed by: PHYSICAL MEDICINE & REHABILITATION

## 2023-04-18 PROCEDURE — 20610 DRAIN/INJ JOINT/BURSA W/O US: CPT | Mod: 59,RT | Performed by: PHYSICAL MEDICINE & REHABILITATION

## 2023-04-18 RX ORDER — BUPIVACAINE HYDROCHLORIDE 2.5 MG/ML
INJECTION, SOLUTION EPIDURAL; INFILTRATION; INTRACAUDAL
Status: DISCONTINUED | OUTPATIENT
Start: 2023-04-18 | End: 2023-04-18 | Stop reason: HOSPADM

## 2023-04-18 RX ORDER — ONDANSETRON 2 MG/ML
4 INJECTION INTRAMUSCULAR; INTRAVENOUS ONCE AS NEEDED
Status: DISCONTINUED | OUTPATIENT
Start: 2023-04-18 | End: 2024-03-12

## 2023-04-18 RX ORDER — FENTANYL CITRATE 50 UG/ML
INJECTION, SOLUTION INTRAMUSCULAR; INTRAVENOUS
Status: DISCONTINUED | OUTPATIENT
Start: 2023-04-18 | End: 2023-04-18 | Stop reason: HOSPADM

## 2023-04-18 RX ORDER — MIDAZOLAM HYDROCHLORIDE 1 MG/ML
INJECTION, SOLUTION INTRAMUSCULAR; INTRAVENOUS
Status: DISCONTINUED | OUTPATIENT
Start: 2023-04-18 | End: 2023-04-18 | Stop reason: HOSPADM

## 2023-04-18 RX ORDER — METHYLPREDNISOLONE ACETATE 40 MG/ML
INJECTION, SUSPENSION INTRA-ARTICULAR; INTRALESIONAL; INTRAMUSCULAR; SOFT TISSUE
Status: DISCONTINUED | OUTPATIENT
Start: 2023-04-18 | End: 2023-04-18 | Stop reason: HOSPADM

## 2023-04-18 NOTE — OP NOTE
Time-out taken to identify patient and procedure side prior to starting the procedure.     04/18/2023      PROCEDURE:  1) Right greater trochanteric bursa injection  2) Right sacroiliac joint injection            3) Right piriformis trigger point injection                  REASON FOR PROCEDURE:   Sacroiliitis [M46.1]  Greater trochanteric bursitis[M70.61]  Myaliga other site    PHYSICIAN: Ras Caballero MD  ASSISTANTS: None    SEDATION: Conscious sedation provided by M.D    The patient was monitored with continuous pulse oximetry, EKG, and intermittent blood pressure monitors, immediately prior to administration of sedation.  The patient was hemodynamically stable throughout the entire process was responsive to voice, and breathing spontaneously.  Supplemental O2 was provided at 2L/min via nasal cannula.  Patient was comfortable for the duration of the procedure.     There was a total of 2mg IV Midazolam and 50mcg Fentanyl titrated for the procedure    Total sedation time was >10minutes and <20minutes        MEDICATIONS INJECTED: 1mL 40mg/ml Depo-Medrol and 4mL Bupivacaine 0.25% into each site    LOCAL ANESTHETIC USED: Xylocaine 1% 6ml     ESTIMATED BLOOD LOSS: None.   COMPLICATIONS: None.     TECHNIQUE:   Greater trochanteric bursa injection:  The area overlying the greater trochanteric bursa was identified using fluoroscopy, and the area overlying the skin was prepped and draped in usual sterile fashion. Local Xylocaine was injected by raising a wheel and going down to the periosteum using a 27-gauge hypodermic needle. A 5 inch 22-gauge spinal needle was introduce into the Right greater trochanteric bursa Negative pressure applied to confirm no intravascular placement. Omnipaque was injected to confirm placement and to confirm that there was no vascular runoff. The medication was then injected slowly.  Displacement of the contrast after injection of the medication confirmed that the medication went into the  "area of the greater trochanteric bursa    Sacroiliac joint injection:   Laying in the prone position, the patient was prepped and draped in the usual sterile fashion using ChloraPrep and fenestrated drape.  The area was determined under fluoroscopy.  Local Xylocaine was injected by raising a wheel and going down to the periosteum using a 27-gauge hypodermic needle.  The 3.5 inch 22-gauge spinal needle was introduce into the Right sacroiliac joint.  Negative pressure applied to confirm no intravascular placement.  Omnipaque was injected to confirm placement and to confirm that there was no vascular runoff.  The medication was then injected slowly.  The patient tolerated the procedure well.                       Piriformis trigger point injection:    The area overlying the right piriformis muscle was identified under fluoroscopy in the AP view, then 5 mL 1% lidocaine was injected into the subcutaneous tissue and deeper tissues over this area through a 25 gauge needle 1.5" needle. The right hip joint was visualized in an AP view. The tip of a 22-gauge 3 1/2 inch Quincke-type spinal needle was advanced 3 cm inferior and 3 cm lateral to the inferior aspect of the SI joint.  Once the piriformis muscle was thought to be encountered, 3 ml of Omnipaque 300 contrast agent was slowly injected. Confirmation of spread of contrast agent within the piriformis muscle was made in the AP fluoroscopic view. Subsequently,  4 ml of Bupivacaine 0.25% and 1mL of  40 mg/mL depomedrol was slowly administered. Displacement of the radio opaque contrast after injection of the medication confirmed that the medication went into the area of the piriformis muscle. The needle was removed and bleeding was nil.  A sterile dressing was applied. Marcie was taken back to the recovery room for further observation.       The patient was monitored for approximately 30 minutes after the procedure. Patient was given post procedure and discharge instructions " to follow at home. We will see the patient back in two weeks or the patient may call to inform of status. The patient was discharged in a stable condition

## 2023-04-18 NOTE — DISCHARGE SUMMARY
Discharge Note  Short Stay      SUMMARY     Admit Date: 4/18/2023    Attending Physician: Ras Caballero MD        Discharge Physician: Ras Caballero MD        Discharge Date: 4/18/2023 7:44 AM    Procedure(s) (LRB):  Right SIJ + Right piriformis +/- Right GT bursa injection (Right)    Final Diagnosis: Piriformis syndrome, right [G57.01]  Greater trochanteric bursitis, right [M70.61]  Sacroiliac joint dysfunction [M53.3]    Disposition: Home or self care    Patient Instructions:   Current Discharge Medication List        CONTINUE these medications which have NOT CHANGED    Details   losartan (COZAAR) 25 MG tablet Take 1 tablet (25 mg total) by mouth once daily.  Qty: 90 tablet, Refills: 3    Comments: -  Associated Diagnoses: Essential hypertension      NIFEdipine (PROCARDIA-XL) 60 MG (OSM) 24 hr tablet TAKE 1 TABLET(60 MG) BY MOUTH EVERY EVENING  Qty: 90 tablet, Refills: 3    Comments: -  Associated Diagnoses: Essential hypertension      amitriptyline (ELAVIL) 25 MG tablet TAKE 1 TO 2 TABLETS(25 TO 50 MG) BY MOUTH EVERY NIGHT AS NEEDED FOR INSOMNIA OR PAIN  Qty: 60 tablet, Refills: 0    Associated Diagnoses: Insomnia secondary to chronic pain      atorvastatin (LIPITOR) 40 MG tablet Take 1 tablet (40 mg total) by mouth every evening.  Qty: 90 tablet, Refills: 2    Associated Diagnoses: Mixed hyperlipidemia      cyclobenzaprine (FLEXERIL) 10 MG tablet TAKE 1/2 TO 1 TABLET BY MOUTH THREE TIMES DAILY AS NEEDED FOR MUSCLE SPASMS  Qty: 90 tablet, Refills: 1    Associated Diagnoses: Piriformis muscle pain      DULoxetine (CYMBALTA) 20 MG capsule Take 1 capsule (20 mg total) by mouth once daily.  Qty: 30 capsule, Refills: 1    Associated Diagnoses: Chronic pain syndrome      ergocalciferol (ERGOCALCIFEROL) 50,000 unit Cap Take 1 capsule (50,000 Units total) by mouth every 7 days.  Qty: 12 capsule, Refills: 3    Comments: -  Associated Diagnoses: Vitamin D deficiency      estradioL (ESTRACE) 0.01 % (0.1 mg/gram)  vaginal cream Insert 1 applicator full vaginally times 2 weeks and then twice a week  Qty: 42.5 g, Refills: 6    Associated Diagnoses: Vaginal dryness, menopausal      famotidine (PEPCID) 20 MG tablet Take 1 tablet (20 mg total) by mouth 2 (two) times a day.  Qty: 180 tablet, Refills: 3    Comments: -  Associated Diagnoses: Gastroesophageal reflux disease with esophagitis without hemorrhage      furosemide (LASIX) 40 MG tablet TAKE 1 TABLET BY MOUTH EVERY DAY  Qty: 90 tablet, Refills: 5    Comments: **Patient requests 90 days supply**  Associated Diagnoses: Prophylactic immunotherapy; Bilateral leg edema      magnesium oxide (MAG-OX) 400 mg (241.3 mg magnesium) tablet TAKE 1 TABLET(400 MG) BY MOUTH TWICE DAILY  Qty: 60 tablet, Refills: 2    Associated Diagnoses: Hypomagnesemia      natrexone tablet 4.5 mg Take 1 tablet (4.5 mg total) by mouth every evening.  Qty: 30 tablet, Refills: 2    Associated Diagnoses: Generalized pain      ondansetron (ZOFRAN) 8 MG tablet Take 1 tablet (8 mg total) by mouth every 12 (twelve) hours as needed for Nausea.  Qty: 60 tablet, Refills: 5    Associated Diagnoses: Chronic nausea      pantoprazole (PROTONIX) 40 MG tablet Take 1 tablet (40 mg total) by mouth every morning.  Qty: 90 tablet, Refills: 3    Associated Diagnoses: Gastroesophageal reflux disease with esophagitis without hemorrhage      potassium chloride (KLOR-CON) 10 MEQ TbSR Take 1 tablet (10 mEq total) by mouth 2 (two) times daily.  Qty: 180 tablet, Refills: 1    Associated Diagnoses: Diuretic-induced hypokalemia      pregabalin (LYRICA) 225 MG Cap Take 1 capsule (225 mg total) by mouth 2 (two) times daily.  Qty: 60 capsule, Refills: 2    Associated Diagnoses: Bilateral lumbar radiculopathy      tacrolimus (PROGRAF) 1 MG Cap TAKE 2 CAPSULES BY MOUTH EVERY MORNING AND 1 CAPSULE EVERY EVENING  Qty: 90 capsule, Refills: 11    Associated Diagnoses: Liver replaced by transplant      traMADoL (ULTRAM) 50 mg tablet TAKE 1/2 TO 1  TABLET(25 TO 50 MG) BY MOUTH EVERY 8 HOURS AS NEEDED FOR PAIN  Qty: 21 tablet, Refills: 0    Comments: Quantity greater than 7 day medically necessary?  Yes  Associated Diagnoses: Right lumbar radiculopathy                 Discharge Diagnosis: Piriformis syndrome, right [G57.01]  Greater trochanteric bursitis, right [M70.61]  Sacroiliac joint dysfunction [M53.3]  Condition on Discharge: Stable with no complications to procedure   Diet on Discharge: Same as before.  Activity: as per instruction sheet.  Discharge to: Home with a responsible adult.  Follow up: 2-4 weeks       Please call the office at [phone] if you experience any weakness or loss of sensation, fever > 101.5, pain uncontrolled with oral medications, persistent nausea/vomiting/or diarrhea, redness or drainage from the incisions, or any other worrisome concerns. If physician on call was not reached or could not communicate with our office for any reason please go to the nearest emergency department

## 2023-04-18 NOTE — PROGRESS NOTES
"TO MY TEAM:   Please COPY and PASTE the text below into a [employer] message to Marcie.  Set "Notify me if not read by" to T+5.  Please call them to follow-up if you receive the unread message reminder.  NOTE: If the patient doesn't have [facility], call the patient to deliver the message and disregard the instructions above.    Thanks.   --------------------------------------------------------------------------------  Marcie Scott.    Your thyroid ultrasound shows a small increase in size of the nodules.     With your permission, I would like to consult one of our excellent ENTs to review your history and test results and give their assessment and recommendations.    This type of consultation is called an "E-consult," and we typically get a response within five business days. E-consults are generally paid for by health insurance, and most of my patients have told me that they never received any charge for the E-consult. If your insurance requires you to have a co-pay to see a specialist, you may be charged that co-pay. I've been told that if you don't have insurance or if, for some reason, your insurance doesn't pay for the e-consult, your maximum out-of-pocket cost would be less than $80.    I need your permission before I can order the E-consult.    QUESTION: Do I have your permission to proceed and order the e-consult?    Please reply by Patient Portal message.    Thanks for letting me care for you, and thanks for trusting [facility] with your healthcare needs.    All the best,    L Rashid Munoz MD    P.S. If my team has already discussed this with you over the phone, and you gave them your permission to proceed, then you can disregard this message."

## 2023-04-18 NOTE — H&P
HPI  Patient presenting for Procedure(s) (LRB):  Right SIJ + Right piriformis + Right GT bursa injection (Right)       No health changes since previous encounter    Past Medical History:   Diagnosis Date    Alcohol abuse     Alcoholic hepatitis     Anemia of chronic disease     Arthritis     generialized    Cancer 12/26/2017    liver    Coagulopathy     Dyspnea on exertion 3/26/2020    Encounter for blood transfusion     End stage liver disease     History of alcohol abuse     Hyperlipidemia     Hypersomnia 9/11/2020    Hypertension     Hyponatremia     Liver cirrhosis     Liver transplant candidate 12/26/2017    Obesity (BMI 30-39.9) 1/5/2016    Sleep apnea      Past Surgical History:   Procedure Laterality Date    BILATERAL SALPINGO-OOPHORECTOMY (BSO) Bilateral 1986    with hysterectomy done for abnormal cells    BREAST BIOPSY Left     benign    BREAST LUMPECTOMY      patient is unsure of date or laterality    CHOLECYSTECTOMY      COLONOSCOPY N/A 12/01/2017    Procedure: COLONOSCOPY;  Surgeon: Filemon Fuentes MD;  [facility]  Service: Endoscopy;  Laterality: N/A;    COLONOSCOPY N/A 12/05/2017    Procedure: COLONOSCOPY;  Surgeon: José Chavira MD;  [facility] ([facility]  Service: Endoscopy;  Laterality: N/A;    COLONOSCOPY N/A 12/12/2022    Procedure: COLONOSCOPY;  Surgeon: Gogo Brown MD;  Location: [facility] ENDO;  Service: Endoscopy;  Laterality: N/A;    EPIDURAL STEROID INJECTION N/A 2/28/2023    Procedure: L4/5 IL WILBERTO (with right paramedian approach);  Surgeon: aRs Caballero MD;  Location: [facility] PAIN MGT;  Service: Pain Management;  Laterality: N/A;    EPIDURAL STEROID INJECTION INTO CERVICAL SPINE N/A 01/04/2022    Procedure: C5/6 IL WILBERTO;  Surgeon: Ras Caballero MD;  Location: [facility] PAIN MGT;  Service: Pain Management;  Laterality: N/A;    EXAMINATION UNDER ANESTHESIA N/A 02/18/2020    Procedure: EXAM UNDER ANESTHESIA;  Surgeon: Alden Horowitz MD;  Location: [facility]  OR;  Service: General;  Laterality: N/A;    EXCISIONAL HEMORRHOIDECTOMY N/A 02/18/2020    Procedure: HEMORRHOIDECTOMY;  Surgeon: Alden Horowitz MD;  Location: [facility] OR;  Service: General;  Laterality: N/A;    HYSTERECTOMY  1986    due to abnormal paps - ovaries were removed    INJECTION OF ANESTHETIC AGENT AROUND PUDENDAL NERVE N/A 02/18/2020    Procedure: BLOCK, NERVE, PUDENDAL;  Surgeon: Alden Horowitz MD;  Location: [facility] OR;  Service: General;  Laterality: N/A;    INJECTION OF ANESTHETIC AGENT INTO SACROILIAC JOINT Right 06/14/2019    Procedure: right Sacroiliac Joint Injection;  Surgeon: David Desai MD;  Location: [facility] PAIN MGT;  Service: Pain Management;  Laterality: Right;    INJECTION OF ANESTHETIC AGENT INTO SACROILIAC JOINT Bilateral 02/07/2020    Procedure: Bilateral GT bursa + Bilateral Sacroiliac Joint Injection;  Surgeon: David Desai MD;  Location: [facility] PAIN MGT;  Service: Pain Management;  Laterality: Bilateral;    INJECTION OF ANESTHETIC AGENT INTO SACROILIAC JOINT Bilateral 07/07/2020    Procedure: Bilateral GT bursa +SIJ injection;  Surgeon: David Desai MD;  Location: [facility] PAIN MGT;  Service: Pain Management;  Laterality: Bilateral;    INJECTION OF ANESTHETIC AGENT INTO SACROILIAC JOINT Right 10/21/2020    Procedure: Right piriformis + right SIJ + right GT bursa injection;  Surgeon: David Desai MD;  Location: [facility] PAIN MGT;  Service: Pain Management;  Laterality: Right;    INJECTION OF ANESTHETIC AGENT INTO SACROILIAC JOINT Bilateral 04/15/2021    Procedure: Bilateral SIJ + Bilateral Piriformis + Bilateral GT Bursa Injection;  Surgeon: Flaco Frazier MD;  Location: [facility] PAIN MGT;  Service: Pain Management;  Laterality: Bilateral;    INJECTION OF ANESTHETIC AGENT INTO SACROILIAC JOINT Bilateral 04/21/2022    Procedure: Bilateral SIJ + Bilateral Piriformis + Bilateral GT Bursa Injection;  Surgeon: Ras Caballero MD;  Location: [facility] PAIN MGT;  Service: Pain Management;   Laterality: Bilateral;    INJECTION OF JOINT Bilateral 02/07/2020    Procedure: Bilateral GT bursa + Bilateral Sacroiliac Joint Injection;  Surgeon: David Desai MD;  Location: [facility] PAIN MGT;  Service: Pain Management;  Laterality: Bilateral;    INJECTION OF JOINT Bilateral 07/07/2020    Procedure: Bilateral GT bursa +SIJ injection;  Surgeon: David Desai MD;  Location: [facility] PAIN MGT;  Service: Pain Management;  Laterality: Bilateral;    INJECTION OF JOINT Right 10/21/2020    Procedure: Right piriformis + right SIJ + right GT bursa injection;  Surgeon: David Desai MD;  Location: [facility] PAIN MGT;  Service: Pain Management;  Laterality: Right;    INJECTION OF JOINT Bilateral 02/08/2021    Procedure: Bilateral GT Bursa Injection;  Surgeon: Anna Kaminski MD;  Location: [facility] PAIN MGT;  Service: Pain Management;  Laterality: Bilateral;    INJECTION OF JOINT Bilateral 04/15/2021    Procedure: Bilateral SIJ + Bilateral Piriformis + Bilateral GT Bursa Injection;  Surgeon: Flaco Frazier MD;  Location: [facility] PAIN MGT;  Service: Pain Management;  Laterality: Bilateral;    INJECTION OF JOINT Bilateral 04/21/2022    Procedure: Bilateral SIJ + Bilateral Piriformis + Bilateral GT Bursa Injection;  Surgeon: Ras Caballero MD;  Location: [facility] PAIN MGT;  Service: Pain Management;  Laterality: Bilateral;    INJECTION OF JOINT Right 10/11/2022    Procedure: right SIJ + right piriformis + right GT bursa injection;  Surgeon: Ras Caballero MD;  Location: [facility] PAIN MGT;  Service: Pain Management;  Laterality: Right;    INJECTION OF PIRIFORMIS MUSCLE Right 06/14/2019    Procedure: Right piriformis injection;  Surgeon: David Desai MD;  Location: [facility] PAIN MGT;  Service: Pain Management;  Laterality: Right;    INJECTION OF PIRIFORMIS MUSCLE Right 10/21/2020    Procedure: Right piriformis + right SIJ + right GT bursa injection;  Surgeon: David Desai MD;  Location: [facility] PAIN MGT;  Service: Pain Management;   Laterality: Right;    INJECTION OF PIRIFORMIS MUSCLE Bilateral 04/15/2021    Procedure: Bilateral SIJ + Bilateral Piriformis + Bilateral GT Bursa Injection;  Surgeon: Flaco Frazier MD;  Location: [facility] PAIN MGT;  Service: Pain Management;  Laterality: Bilateral;    INJECTION OF PIRIFORMIS MUSCLE Bilateral 04/21/2022    Procedure: Bilateral SIJ + Bilateral Piriformis + Bilateral GT Bursa Injection;  Surgeon: Ras Caballero MD;  Location: [facility] PAIN MGT;  Service: Pain Management;  Laterality: Bilateral;    LIVER TRANSPLANT  12/2017    SELECTIVE INJECTION OF ANESTHETIC AGENT AROUND LUMBAR SPINAL NERVE ROOT BY TRANSFORAMINAL APPROACH Bilateral 10/28/2021    Procedure: BLOCK, SPINAL NERVE ROOT, LUMBAR, SELECTIVE, TRANSFORAMINAL APPROACH bilateral L4-5 and Right Knee injection with RN IV sedation;  Surgeon: Flaco Frazier MD;  Location: [facility] PAIN MGT;  Service: Pain Management;  Laterality: Bilateral;    SELECTIVE INJECTION OF ANESTHETIC AGENT AROUND LUMBAR SPINAL NERVE ROOT BY TRANSFORAMINAL APPROACH Bilateral 08/18/2022    Procedure: Bilateral L4/5 TF WILBERTO;  Surgeon: Ras Caballero MD;  Location: [facility] PAIN MGT;  Service: Pain Management;  Laterality: Bilateral;     Review of patient's allergies indicates:  No Known Allergies     Current Facility-Administered Medications on File Prior to Encounter   Medication Dose Route Frequency Provider Last Rate Last Admin    ondansetron injection 4 mg  4 mg Intravenous Once PRN Ras Caballero MD         Current Outpatient Medications on File Prior to Encounter   Medication Sig Dispense Refill    losartan (COZAAR) 25 MG tablet Take 1 tablet (25 mg total) by mouth once daily. 90 tablet 3    NIFEdipine (PROCARDIA-XL) 60 MG (OSM) 24 hr tablet TAKE 1 TABLET(60 MG) BY MOUTH EVERY EVENING 90 tablet 3    atorvastatin (LIPITOR) 40 MG tablet Take 1 tablet (40 mg total) by mouth every evening. 90 tablet 2    cyclobenzaprine (FLEXERIL) 10 MG tablet TAKE 1/2 TO 1 TABLET BY MOUTH THREE TIMES  "DAILY AS NEEDED FOR MUSCLE SPASMS 90 tablet 1    DULoxetine (CYMBALTA) 20 MG capsule Take 1 capsule (20 mg total) by mouth once daily. 30 capsule 1    ergocalciferol (ERGOCALCIFEROL) 50,000 unit Cap Take 1 capsule (50,000 Units total) by mouth every 7 days. 12 capsule 3    estradioL (ESTRACE) 0.01 % (0.1 mg/gram) vaginal cream Insert 1 applicator full vaginally times 2 weeks and then twice a week 42.5 g 6    famotidine (PEPCID) 20 MG tablet Take 1 tablet (20 mg total) by mouth 2 (two) times a day. 180 tablet 3    furosemide (LASIX) 40 MG tablet TAKE 1 TABLET BY MOUTH EVERY DAY 90 tablet 5    magnesium oxide (MAG-OX) 400 mg (241.3 mg magnesium) tablet TAKE 1 TABLET(400 MG) BY MOUTH TWICE DAILY 60 tablet 2    natrexone tablet 4.5 mg Take 1 tablet (4.5 mg total) by mouth every evening. 30 tablet 2    ondansetron (ZOFRAN) 8 MG tablet Take 1 tablet (8 mg total) by mouth every 12 (twelve) hours as needed for Nausea. 60 tablet 5    pantoprazole (PROTONIX) 40 MG tablet Take 1 tablet (40 mg total) by mouth every morning. 90 tablet 3    potassium chloride (KLOR-CON) 10 MEQ TbSR Take 1 tablet (10 mEq total) by mouth 2 (two) times daily. 180 tablet 1    pregabalin (LYRICA) 225 MG Cap Take 1 capsule (225 mg total) by mouth 2 (two) times daily. 60 capsule 2    tacrolimus (PROGRAF) 1 MG Cap TAKE 2 CAPSULES BY MOUTH EVERY MORNING AND 1 CAPSULE EVERY EVENING 90 capsule 11        PMHx, PSHx, Allergies, Medications reviewed in epic    ROS negative except pain complaints in HPI    OBJECTIVE:    /69 (BP Location: Right arm, Patient Position: Sitting)   Pulse 81   Temp 97 °F (36.1 °C) (Temporal)   Resp 16   Ht 5' 5" (1.651 m)   Wt 100.4 kg (221 lb 5.5 oz)   LMP 01/01/1985 (Approximate) Comment: 1985  SpO2 96%   Breastfeeding No   BMI 36.83 kg/m²     PHYSICAL EXAMINATION:    GENERAL: Well appearing, in no acute distress, alert and oriented x3.  PSYCH:  Mood and affect appropriate.  SKIN: Skin color, texture, turgor normal, " no rashes or lesions which will impact the procedure.  CV: RRR with palpation of the radial artery.  PULM: No evidence of respiratory difficulty, symmetric chest rise. Clear to auscultation.  NEURO: Cranial nerves grossly intact.    Plan:    Proceed with procedure as planned Procedure(s) (LRB):  Right SIJ + Right piriformis + Right GT bursa injection (Right)    Ras Caballero MD  04/18/2023

## 2023-04-18 NOTE — DISCHARGE INSTRUCTIONS

## 2023-04-22 PROBLEM — R73.03 PREDIABETES: Chronic | Status: ACTIVE | Noted: 2023-04-22

## 2023-04-27 ENCOUNTER — NURSE TRIAGE (OUTPATIENT)
Dept: ADMINISTRATIVE | Facility: CLINIC | Age: 58
End: 2023-04-27
Payer: MEDICAID

## 2023-04-27 NOTE — TELEPHONE ENCOUNTER
Called the patient in regarding to seeing if she was going to the ER. Pt states she is about to leave to go now. Information given to Dr. Caballero.

## 2023-04-27 NOTE — TELEPHONE ENCOUNTER
OOC incoming call - Pt c/o severe pain, inability to put pressure on her legs d/t severe pain, sudden onset, no sob, no cp. Leg pain protocol followed and pt advised to go to the ER now for further assessment and care and if she cannot make it there for any reason or gets worse to call 911 now for ems for this is considered a medical emergency at this time. Alert and oriented, Pt agrees with dispo. Encounter routed to PCP and pain mgmt/staff as high priority. Reason for Disposition   Unable to walk    Additional Information   Negative: Looks like a broken bone or dislocated joint (e.g., crooked or deformed)   Negative: Sounds like a life-threatening emergency to the triager   Negative: Chest pain   Negative: Difficulty breathing   Negative: Entire foot is cool or blue in comparison to other side    Protocols used: Leg Pain-A-[place]     Resident

## 2023-05-09 DIAGNOSIS — M54.16 BILATERAL LUMBAR RADICULOPATHY: ICD-10-CM

## 2023-05-10 ENCOUNTER — OFFICE VISIT (OUTPATIENT)
Dept: SLEEP MEDICINE | Facility: CLINIC | Age: 58
End: 2023-05-10
Payer: MEDICAID

## 2023-05-10 VITALS
HEART RATE: 85 BPM | SYSTOLIC BLOOD PRESSURE: 126 MMHG | RESPIRATION RATE: 17 BRPM | HEIGHT: 65 IN | BODY MASS INDEX: 37.06 KG/M2 | WEIGHT: 222.44 LBS | DIASTOLIC BLOOD PRESSURE: 70 MMHG | OXYGEN SATURATION: 97 %

## 2023-05-10 DIAGNOSIS — E66.01 SEVERE OBESITY (BMI 35.0-39.9) WITH COMORBIDITY: ICD-10-CM

## 2023-05-10 DIAGNOSIS — G47.33 OSA (OBSTRUCTIVE SLEEP APNEA): Primary | ICD-10-CM

## 2023-05-10 PROCEDURE — 1159F MED LIST DOCD IN RCRD: CPT | Mod: CPTII,,, | Performed by: NURSE PRACTITIONER

## 2023-05-10 PROCEDURE — 1160F RVW MEDS BY RX/DR IN RCRD: CPT | Mod: CPTII,,, | Performed by: NURSE PRACTITIONER

## 2023-05-10 PROCEDURE — 4010F ACE/ARB THERAPY RXD/TAKEN: CPT | Mod: CPTII,,, | Performed by: NURSE PRACTITIONER

## 2023-05-10 PROCEDURE — 3044F HG A1C LEVEL LT 7.0%: CPT | Mod: CPTII,,, | Performed by: NURSE PRACTITIONER

## 2023-05-10 PROCEDURE — 99999 PR PBB SHADOW E&M-EST. PATIENT-LVL IV: ICD-10-PCS | Mod: PBBFAC,,, | Performed by: NURSE PRACTITIONER

## 2023-05-10 PROCEDURE — 99214 OFFICE O/P EST MOD 30 MIN: CPT | Mod: PBBFAC | Performed by: NURSE PRACTITIONER

## 2023-05-10 PROCEDURE — 3074F PR MOST RECENT SYSTOLIC BLOOD PRESSURE < 130 MM HG: ICD-10-PCS | Mod: CPTII,,, | Performed by: NURSE PRACTITIONER

## 2023-05-10 PROCEDURE — 99213 PR OFFICE/OUTPT VISIT, EST, LEVL III, 20-29 MIN: ICD-10-PCS | Mod: S$PBB,,, | Performed by: NURSE PRACTITIONER

## 2023-05-10 PROCEDURE — 3078F PR MOST RECENT DIASTOLIC BLOOD PRESSURE < 80 MM HG: ICD-10-PCS | Mod: CPTII,,, | Performed by: NURSE PRACTITIONER

## 2023-05-10 PROCEDURE — 99999 PR PBB SHADOW E&M-EST. PATIENT-LVL IV: CPT | Mod: PBBFAC,,, | Performed by: NURSE PRACTITIONER

## 2023-05-10 PROCEDURE — 3074F SYST BP LT 130 MM HG: CPT | Mod: CPTII,,, | Performed by: NURSE PRACTITIONER

## 2023-05-10 PROCEDURE — 99213 OFFICE O/P EST LOW 20 MIN: CPT | Mod: S$PBB,,, | Performed by: NURSE PRACTITIONER

## 2023-05-10 PROCEDURE — 4010F PR ACE/ARB THEARPY RXD/TAKEN: ICD-10-PCS | Mod: CPTII,,, | Performed by: NURSE PRACTITIONER

## 2023-05-10 PROCEDURE — 3008F BODY MASS INDEX DOCD: CPT | Mod: CPTII,,, | Performed by: NURSE PRACTITIONER

## 2023-05-10 PROCEDURE — 1160F PR REVIEW ALL MEDS BY PRESCRIBER/CLIN PHARMACIST DOCUMENTED: ICD-10-PCS | Mod: CPTII,,, | Performed by: NURSE PRACTITIONER

## 2023-05-10 PROCEDURE — 1159F PR MEDICATION LIST DOCUMENTED IN MEDICAL RECORD: ICD-10-PCS | Mod: CPTII,,, | Performed by: NURSE PRACTITIONER

## 2023-05-10 PROCEDURE — 3078F DIAST BP <80 MM HG: CPT | Mod: CPTII,,, | Performed by: NURSE PRACTITIONER

## 2023-05-10 PROCEDURE — 3008F PR BODY MASS INDEX (BMI) DOCUMENTED: ICD-10-PCS | Mod: CPTII,,, | Performed by: NURSE PRACTITIONER

## 2023-05-10 PROCEDURE — 3044F PR MOST RECENT HEMOGLOBIN A1C LEVEL <7.0%: ICD-10-PCS | Mod: CPTII,,, | Performed by: NURSE PRACTITIONER

## 2023-05-10 RX ORDER — ONDANSETRON 4 MG/1
TABLET, ORALLY DISINTEGRATING ORAL
COMMUNITY
Start: 2022-06-30 | End: 2024-01-31

## 2023-05-10 RX ORDER — PREGABALIN 225 MG/1
CAPSULE ORAL
Qty: 60 CAPSULE | Refills: 1 | Status: SHIPPED | OUTPATIENT
Start: 2023-05-10 | End: 2023-07-18 | Stop reason: SDUPTHER

## 2023-05-10 NOTE — PROGRESS NOTES
"Subjective:      Patient ID: Marcie Bazan is a 57 y.o. female.    Chief Complaint: Sleep Apnea    HPI    Presents to office for review of AutoPAP therapy. Patient states improved symptoms with use of AutoPAP. Sleeping more soundly. Waking up feeling more refreshed. Improved daytime sleepiness. Patient states he is benefiting from use of the AutoPAP.     Patient Active Problem List   Diagnosis    Allergic rhinitis    Anemia of chronic disease    Erosive esophagitis    Essential hypertension    History of abnormal cervical Pap smear    History of Helicobacter pylori infection    Lipoma    Multinodular thyroid    Diuretic-induced hypokalemia    Hypomagnesemia    Thrombocytopenia    Liver replaced by transplant    Immunosuppression    At risk for opportunistic infections    Long-term use of immunosuppressant medication    Vitamin D deficiency    Mixed hyperlipidemia    Adhesive capsulitis of both shoulders    Piriformis syndrome of both sides    Spondylosis of lumbar region without myelopathy or radiculopathy    Class 2 severe obesity due to excess calories with serious comorbidity and body mass index (BMI) of 36.0 to 36.9 in adult    Sacroiliac joint dysfunction    Acute stress reaction    Hypersomnia    BRANNON (obstructive sleep apnea)    Gastroesophageal reflux disease with esophagitis without hemorrhage    Piriformis muscle pain    Long term current use of loop diuretic    Iron deficiency anemia    Iron deficiency anemia due to chronic blood loss    Decreased strength, endurance, and mobility    Knee pain, bilateral    Choking    Decreased hearing of both ears    Occipital neuralgia of left side    COVID    Chronic constipation    Chronic nausea    Prediabetes     /70   Pulse 85   Resp 17   Ht 5' 5" (1.651 m)   Wt 100.9 kg (222 lb 7.1 oz)   LMP 01/01/1985 (Approximate) Comment: 1985  SpO2 97%   BMI 37.02 kg/m²   Body mass index is 37.02 kg/m².    Review of Systems   Constitutional: Negative.    HENT: " Negative.     Respiratory: Negative.     Cardiovascular: Negative.    Musculoskeletal: Negative.    Gastrointestinal: Negative.    Neurological: Negative.    Psychiatric/Behavioral: Negative.     Objective:      Physical Exam  Constitutional:       Appearance: She is well-developed. She is obese.   HENT:      Head: Normocephalic and atraumatic.      Nose: Nose normal.      Mouth/Throat:      Pharynx: Oropharynx is clear.   Cardiovascular:      Rate and Rhythm: Normal rate and regular rhythm.      Heart sounds: No murmur heard.    No gallop.   Pulmonary:      Effort: Pulmonary effort is normal.      Breath sounds: Normal breath sounds.   Abdominal:      Palpations: Abdomen is soft. There is no mass.      Tenderness: There is no abdominal tenderness.   Musculoskeletal:         General: Normal range of motion.      Cervical back: Normal range of motion and neck supple.   Skin:     General: Skin is warm and dry.   Neurological:      Mental Status: She is alert and oriented to person, place, and time.   Psychiatric:         Mood and Affect: Mood normal.         Behavior: Behavior normal.     Personal Diagnostic Review  Compliance Report  Compliance  Payor Standard  Usage 04/04/2023 - 05/03/2023  Usage days 13/30 days (43%)  >= 4 hours 6 days (20%)  < 4 hours 7 days (23%)  Usage hours 55 hours 10 minutes  Average usage (total days) 1 hours 50 minutes  Average usage (days used) 4 hours 15 minutes  Median usage (days used) 3 hours 54 minutes  Total used hours (value since last reset - 05/03/2023) 2,655 hours  AirSense 10 AutoSet  Serial number [id number]  Mode AutoSet  Min Pressure 6 cmH2O  Max Pressure 20 cmH2O  EPR Fulltime  EPR level 3  Response Standard  Therapy  Pressure - cmH2O Median: 7.8 95th percentile: 9.6 Maximum: 10.2  Leaks - L/min Median: 1.5 95th percentile: 8.2 Maximum: 14.0  Events per hour AI: 2.4 HI: 0.0 AHI: 2.4  Apnea Index Central: 1.3 Obstructive: 0.8 Unknown: 0.1  RERA Index 0.1  Cheyne-Dodd  respiration (average duration per night) 0 minutes (0%)  Usage - hours  Printed    Assessment:       1. BRANNON (obstructive sleep apnea)    2. Severe obesity (BMI 35.0-39.9) with comorbidity        Outpatient Encounter Medications as of 5/10/2023   Medication Sig Dispense Refill    ondansetron (ZOFRAN-ODT) 4 MG TbDL ondansetron Take 1 Tablet (oral) 3 times per day PRN - Nausea 20220630 tablet,disintegrating 3 times per day oral No set duration recorded No set duration amount recorded active 4 mg      amitriptyline (ELAVIL) 25 MG tablet TAKE 1 TO 2 TABLETS(25 TO 50 MG) BY MOUTH EVERY NIGHT AS NEEDED FOR INSOMNIA OR PAIN 60 tablet 0    atorvastatin (LIPITOR) 40 MG tablet Take 1 tablet (40 mg total) by mouth every evening. 90 tablet 2    cyclobenzaprine (FLEXERIL) 10 MG tablet TAKE 1/2 TO 1 TABLET BY MOUTH THREE TIMES DAILY AS NEEDED FOR MUSCLE SPASMS 90 tablet 1    DULoxetine (CYMBALTA) 20 MG capsule Take 1 capsule (20 mg total) by mouth once daily. 30 capsule 1    ergocalciferol (ERGOCALCIFEROL) 50,000 unit Cap Take 1 capsule (50,000 Units total) by mouth every 7 days. 12 capsule 3    estradioL (ESTRACE) 0.01 % (0.1 mg/gram) vaginal cream Insert 1 applicator full vaginally times 2 weeks and then twice a week 42.5 g 6    famotidine (PEPCID) 20 MG tablet Take 1 tablet (20 mg total) by mouth 2 (two) times a day. 180 tablet 3    furosemide (LASIX) 40 MG tablet TAKE 1 TABLET BY MOUTH EVERY DAY 90 tablet 5    losartan (COZAAR) 25 MG tablet Take 1 tablet (25 mg total) by mouth once daily. 90 tablet 3    magnesium oxide (MAG-OX) 400 mg (241.3 mg magnesium) tablet TAKE 1 TABLET(400 MG) BY MOUTH TWICE DAILY 60 tablet 2    natrexone tablet 4.5 mg Take 1 tablet (4.5 mg total) by mouth every evening. 30 tablet 2    NIFEdipine (PROCARDIA-XL) 60 MG (OSM) 24 hr tablet TAKE 1 TABLET(60 MG) BY MOUTH EVERY EVENING 90 tablet 3    ondansetron (ZOFRAN) 8 MG tablet Take 1 tablet (8 mg total) by mouth every 12 (twelve) hours as needed for  Nausea. 60 tablet 5    pantoprazole (PROTONIX) 40 MG tablet Take 1 tablet (40 mg total) by mouth every morning. 90 tablet 3    potassium chloride (KLOR-CON) 10 MEQ TbSR Take 1 tablet (10 mEq total) by mouth 2 (two) times daily. 180 tablet 1    pregabalin (LYRICA) 225 MG Cap TAKE 1 CAPSULE(225 MG) BY MOUTH TWICE DAILY 60 capsule 1    tacrolimus (PROGRAF) 1 MG Cap TAKE 2 CAPSULES BY MOUTH EVERY MORNING AND 1 CAPSULE EVERY EVENING 90 capsule 11    traMADoL (ULTRAM) 50 mg tablet TAKE 1/2 TO 1 TABLET(25 TO 50 MG) BY MOUTH EVERY 8 HOURS AS NEEDED FOR PAIN 21 tablet 0    [DISCONTINUED] pregabalin (LYRICA) 225 MG Cap Take 1 capsule (225 mg total) by mouth 2 (two) times daily. 60 capsule 2     Facility-Administered Encounter Medications as of 5/10/2023   Medication Dose Route Frequency Provider Last Rate Last Admin    ondansetron injection 4 mg  4 mg Intravenous Once PRN Ras Caballero MD        ondansetron injection 4 mg  4 mg Intravenous Once PRN Ras Caballero MD         Orders Placed This Encounter   Procedures    CPAP/BIPAP SUPPLIES     Order Specific Question:   Length of need (1-99 months):     Answer:   99     Order Specific Question:   Choose ONE mask type and its corresponding cushions and/or pillows:     Answer:    Nasal Mask, 1 per 90 days:  Nasal Cushions, (6 per 90 days):  Nasal Pillows, (6 per 90 days)     Order Specific Question:   Choose EITHER Heated or Non-Heated Tubjing     Answer:    Non-Heated Tubing, 1 per 90 days     Order Specific Question:   All other supplies as needed as listed below:     Answer:    Headgear, 1 per 180 days     Order Specific Question:   All other supplies as needed as listed below:     Answer:    Disposable Filter, 6 per 90 days     Order Specific Question:   All other supplies as needed as listed below:     Answer:    Non-Disposable Filter, 1 per 180 days     Order Specific Question:   All other supplies as needed as listed below:      Answer:    Humidifier Chamber, 1 per 180 days     Order Specific Question:   All other supplies as needed as listed below:     Answer:    Chin Strap, 1 per 180 days     Plan:     Compliant with PAP and benefits from use. Follow up annually in the sleep clinic.  Weight loss and exercise to improve overall health.     Problem List Items Addressed This Visit          Other    BRANNON (obstructive sleep apnea) - Primary    Relevant Orders    CPAP/BIPAP SUPPLIES     Other Visit Diagnoses       Severe obesity (BMI 35.0-39.9) with comorbidity                             Elizabeth LeJeune, ACNP, ANP

## 2023-05-19 NOTE — TELEPHONE ENCOUNTER
NIA updated patient on LTAC referrals sent as well .         Meena Powell LMSW  [facility]   [phone]     Returned call will send information on my [facility].

## 2023-05-25 ENCOUNTER — OFFICE VISIT (OUTPATIENT)
Dept: PAIN MEDICINE | Facility: CLINIC | Age: 58
End: 2023-05-25
Payer: MEDICAID

## 2023-05-25 ENCOUNTER — TELEPHONE (OUTPATIENT)
Dept: TRANSPLANT | Facility: CLINIC | Age: 58
End: 2023-05-25
Payer: MEDICAID

## 2023-05-25 VITALS — RESPIRATION RATE: 17 BRPM

## 2023-05-25 DIAGNOSIS — R29.898 COMPLAINTS OF LEG WEAKNESS: ICD-10-CM

## 2023-05-25 DIAGNOSIS — M53.3 SACROILIAC JOINT DYSFUNCTION: Primary | ICD-10-CM

## 2023-05-25 DIAGNOSIS — M54.16 RIGHT LUMBAR RADICULOPATHY: ICD-10-CM

## 2023-05-25 PROCEDURE — 3044F HG A1C LEVEL LT 7.0%: CPT | Mod: CPTII,95,, | Performed by: PHYSICIAN ASSISTANT

## 2023-05-25 PROCEDURE — 1160F RVW MEDS BY RX/DR IN RCRD: CPT | Mod: CPTII,95,, | Performed by: PHYSICIAN ASSISTANT

## 2023-05-25 PROCEDURE — 4010F ACE/ARB THERAPY RXD/TAKEN: CPT | Mod: CPTII,95,, | Performed by: PHYSICIAN ASSISTANT

## 2023-05-25 PROCEDURE — 99212 OFFICE O/P EST SF 10 MIN: CPT | Mod: 95,,, | Performed by: PHYSICIAN ASSISTANT

## 2023-05-25 PROCEDURE — 1160F PR REVIEW ALL MEDS BY PRESCRIBER/CLIN PHARMACIST DOCUMENTED: ICD-10-PCS | Mod: CPTII,95,, | Performed by: PHYSICIAN ASSISTANT

## 2023-05-25 PROCEDURE — 4010F PR ACE/ARB THEARPY RXD/TAKEN: ICD-10-PCS | Mod: CPTII,95,, | Performed by: PHYSICIAN ASSISTANT

## 2023-05-25 PROCEDURE — 1159F MED LIST DOCD IN RCRD: CPT | Mod: CPTII,95,, | Performed by: PHYSICIAN ASSISTANT

## 2023-05-25 PROCEDURE — 99212 PR OFFICE/OUTPT VISIT, EST, LEVL II, 10-19 MIN: ICD-10-PCS | Mod: 95,,, | Performed by: PHYSICIAN ASSISTANT

## 2023-05-25 PROCEDURE — 1159F PR MEDICATION LIST DOCUMENTED IN MEDICAL RECORD: ICD-10-PCS | Mod: CPTII,95,, | Performed by: PHYSICIAN ASSISTANT

## 2023-05-25 PROCEDURE — 3044F PR MOST RECENT HEMOGLOBIN A1C LEVEL <7.0%: ICD-10-PCS | Mod: CPTII,95,, | Performed by: PHYSICIAN ASSISTANT

## 2023-05-25 NOTE — PROGRESS NOTES
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 injection on 10/11/22 with 75% pain relief, still reporting 9/10 pain.   - S/p Bilateral L4/5 TF WILBERTO on 8/18/22 with 85% pain relief - now localized right SIJ pain.   - S/p bilateral SIJ + bilateral GT bursa + bilateral piriformis injection on 4/21/22 with limited pain relief.  - S/p C5/6 IL WILBERTO on 1/4/22 with 100% pain relief   - S/p bilateral L4/5 TF WILBERTO on 10/28/21 with 90% pain relief   - S/p Bilateral SIJ + Bilateral Piriformis + Bilateral GT Bursa Injection on 4/15/21 with 75% pain relief - but continuing to have leg pain.  - S/p bilateral GT bursa injection on 02/08/2021 with 10% pain relief with Dr. Kaminski.  She feels pain is worse than prior to the procedure.  - S/p right SIJ + piriformis + GT bursa injection on 10/21/20 with 90% pain relief.     2. Pharmacologic:   - Continue bridge of tramadol 50mg (21  tabs - 7 day supply) to take 1/2 to 1 tab for severe pain.    - Refill Lyrica 225mg BID, amitriptyline 25-50mg QHS (Increased at previous visit), Flexeril 10mg to take 1/2 to 1 tab TID PRN.  - Refill Cymbalta 20 mg QD - started at previous visit, which was previously helping some. Consider Increase.  - Consider retrying naltrexone -- although too expensive in the past.  - No NSAIDs due to h/o transplant.   - Anticoagulation use: None.     3. Rehabilitative: Continue exercises and activities as tolerated. She went to PT, but she felt pain worse  when she got home.    4. Diagnostic: Order BLE EMG/NCS since pain not improved after procedure.    5. Consult/ Referral:   - Consider referral to Physiatry from EMG/NCS.  - Previously placed referral to Neurology for new onset headaches for a few months with associated dizziness & nausea.  She saw primary care who told her to ask our dept about the headaches. Per PCP note-believed to be occipital neuralgia. Headaches have resolved, so will hold off for now.    6. Follow up:     - This condition does not require this patient to take time off of work, and the primary goal of our Pain Management services is to improve the patient's functional capacity.   - I discussed the risks, benefits, and alternatives to potential treatment options. All questions and concerns were fully addressed today in clinic.         Emilee Buitrago PA-C  Interventional Pain Management - [facility]    Disclaimer:  This note was prepared using voice recognition system and is likely to have sound alike errors that may have been overlooked even after proof reading.  Please call me with any questions.

## 2023-05-30 ENCOUNTER — LAB VISIT (OUTPATIENT)
Dept: LAB | Facility: HOSPITAL | Age: 58
End: 2023-05-30
Attending: FAMILY MEDICINE
Payer: MEDICAID

## 2023-05-30 DIAGNOSIS — D63.8 ANEMIA OF CHRONIC DISEASE: ICD-10-CM

## 2023-05-30 DIAGNOSIS — Z94.4 LIVER REPLACED BY TRANSPLANT: ICD-10-CM

## 2023-05-30 DIAGNOSIS — D69.6 THROMBOCYTOPENIA: ICD-10-CM

## 2023-05-30 LAB
ALBUMIN SERPL BCP-MCNC: 3.6 G/DL (ref 3.5–5.2)
ALP SERPL-CCNC: 175 U/L (ref 55–135)
ALT SERPL W/O P-5'-P-CCNC: 11 U/L (ref 10–44)
ANION GAP SERPL CALC-SCNC: 11 MMOL/L (ref 8–16)
AST SERPL-CCNC: 11 U/L (ref 10–40)
BASOPHILS # BLD AUTO: 0.04 K/UL (ref 0–0.2)
BASOPHILS NFR BLD: 0.4 % (ref 0–1.9)
BILIRUB SERPL-MCNC: 0.7 MG/DL (ref 0.1–1)
BUN SERPL-MCNC: 15 MG/DL (ref 6–20)
CALCIUM SERPL-MCNC: 8.9 MG/DL (ref 8.7–10.5)
CHLORIDE SERPL-SCNC: 103 MMOL/L (ref 95–110)
CO2 SERPL-SCNC: 26 MMOL/L (ref 23–29)
CREAT SERPL-MCNC: 0.9 MG/DL (ref 0.5–1.4)
DIFFERENTIAL METHOD: ABNORMAL
EOSINOPHIL # BLD AUTO: 0.3 K/UL (ref 0–0.5)
EOSINOPHIL NFR BLD: 3.3 % (ref 0–8)
ERYTHROCYTE [DISTWIDTH] IN BLOOD BY AUTOMATED COUNT: 15.9 % (ref 11.5–14.5)
EST. GFR  (NO RACE VARIABLE): >60 ML/MIN/1.73 M^2
GLUCOSE SERPL-MCNC: 103 MG/DL (ref 70–110)
HCT VFR BLD AUTO: 40.5 % (ref 37–48.5)
HGB BLD-MCNC: 13.2 G/DL (ref 12–16)
IMM GRANULOCYTES # BLD AUTO: 0.02 K/UL (ref 0–0.04)
IMM GRANULOCYTES NFR BLD AUTO: 0.2 % (ref 0–0.5)
LYMPHOCYTES # BLD AUTO: 2.7 K/UL (ref 1–4.8)
LYMPHOCYTES NFR BLD: 29.3 % (ref 18–48)
MCH RBC QN AUTO: 25.9 PG (ref 27–31)
MCHC RBC AUTO-ENTMCNC: 32.6 G/DL (ref 32–36)
MCV RBC AUTO: 79 FL (ref 82–98)
MONOCYTES # BLD AUTO: 0.6 K/UL (ref 0.3–1)
MONOCYTES NFR BLD: 6 % (ref 4–15)
NEUTROPHILS # BLD AUTO: 5.6 K/UL (ref 1.8–7.7)
NEUTROPHILS NFR BLD: 60.8 % (ref 38–73)
NRBC BLD-RTO: 0 /100 WBC
PLATELET # BLD AUTO: 262 K/UL (ref 150–450)
PMV BLD AUTO: 9.9 FL (ref 9.2–12.9)
POTASSIUM SERPL-SCNC: 3.7 MMOL/L (ref 3.5–5.1)
PROT SERPL-MCNC: 6.9 G/DL (ref 6–8.4)
RBC # BLD AUTO: 5.1 M/UL (ref 4–5.4)
SODIUM SERPL-SCNC: 140 MMOL/L (ref 136–145)
WBC # BLD AUTO: 9.19 K/UL (ref 3.9–12.7)

## 2023-05-30 PROCEDURE — 36415 COLL VENOUS BLD VENIPUNCTURE: CPT | Performed by: INTERNAL MEDICINE

## 2023-05-30 PROCEDURE — 80053 COMPREHEN METABOLIC PANEL: CPT | Performed by: INTERNAL MEDICINE

## 2023-05-30 PROCEDURE — 80197 ASSAY OF TACROLIMUS: CPT | Performed by: INTERNAL MEDICINE

## 2023-05-30 PROCEDURE — 82977 ASSAY OF GGT: CPT | Performed by: INTERNAL MEDICINE

## 2023-05-30 PROCEDURE — 85025 COMPLETE CBC W/AUTO DIFF WBC: CPT | Performed by: INTERNAL MEDICINE

## 2023-05-31 ENCOUNTER — TELEPHONE (OUTPATIENT)
Dept: OBSTETRICS AND GYNECOLOGY | Facility: CLINIC | Age: 58
End: 2023-05-31
Payer: MEDICAID

## 2023-05-31 LAB
GGT SERPL-CCNC: 17 U/L (ref 8–55)
TACROLIMUS BLD-MCNC: 5 NG/ML (ref 5–15)

## 2023-05-31 NOTE — TELEPHONE ENCOUNTER
----- Message from Lillian Zhu sent at 5/31/2023  8:15 AM CDT -----  Contact: [phone]  Patient would like to consult with a nurse in regards to her scheduled appt. Please call to advise at [phone]. Thanks

## 2023-05-31 NOTE — TELEPHONE ENCOUNTER
"Called patient and she stated "I'm in too much pain to come".  Offered to reschedule appointment.  Patient stated she would call back to schedule.  "

## 2023-06-01 ENCOUNTER — TELEPHONE (OUTPATIENT)
Dept: PAIN MEDICINE | Facility: CLINIC | Age: 58
End: 2023-06-01
Payer: MEDICAID

## 2023-06-01 NOTE — TELEPHONE ENCOUNTER
Reach out to pt to inform her that someone will  give her a call on tomorrow to get her scheduled for her EMG.      Laron Diana   Medical Assistant

## 2023-06-01 NOTE — TELEPHONE ENCOUNTER
----- Message from Pham Dennis sent at 6/1/2023  4:13 PM CDT -----  Contact: Marcie Patterson called in to check the status of the referral request. Please call her back at [phone].    Thanks  TS

## 2023-06-05 ENCOUNTER — TELEPHONE (OUTPATIENT)
Dept: PHYSICAL MEDICINE AND REHAB | Facility: CLINIC | Age: 58
End: 2023-06-05
Payer: MEDICAID

## 2023-06-05 DIAGNOSIS — Z94.4 LIVER REPLACED BY TRANSPLANT: ICD-10-CM

## 2023-06-05 NOTE — TELEPHONE ENCOUNTER
----- Message from Laron Diana MA sent at 6/1/2023  4:46 PM CDT -----  Regarding: emg  Good Morning,    Can you schedule this patient EMG please.     Thank you.  Laron Diana   Medical Assistant

## 2023-06-06 ENCOUNTER — TELEPHONE (OUTPATIENT)
Dept: TRANSPLANT | Facility: CLINIC | Age: 58
End: 2023-06-06
Payer: MEDICAID

## 2023-06-06 DIAGNOSIS — M54.16 RIGHT LUMBAR RADICULOPATHY: ICD-10-CM

## 2023-06-06 RX ORDER — TACROLIMUS 1 MG/1
CAPSULE ORAL
Qty: 90 CAPSULE | Refills: 2 | Status: SHIPPED | OUTPATIENT
Start: 2023-06-06 | End: 2023-09-05

## 2023-06-06 NOTE — LETTER
June 6, 2023    Marcie Bazan  [address] [address]          Dear Marcie Bazan:  MRN: [id number]    This is a follow up to your recent labs, your lab results were stable.  There are no medicine changes.  Please have your labs drawn again on 9/18/2023.      If you cannot have your labs drawn on the scheduled date, it is your responsibility to call the transplant department to reschedule.  Please call [phone] and ask to speak to Mica PURVIS -  for all scheduling requests.     Sincerely,    Viola SANDHUN, RN        Your Liver Transplant Coordinator    [facility]  [address] [place] [id number] [phone]

## 2023-06-07 ENCOUNTER — E-CONSULT (OUTPATIENT)
Dept: OTOLARYNGOLOGY | Facility: CLINIC | Age: 58
End: 2023-06-07
Payer: MEDICAID

## 2023-06-07 DIAGNOSIS — E04.2 MULTINODULAR THYROID: Primary | ICD-10-CM

## 2023-06-07 DIAGNOSIS — E04.2 MULTIPLE THYROID NODULES: Primary | ICD-10-CM

## 2023-06-07 PROCEDURE — 99452 NTRPROF PH1/NTRNET/EHR RFRL: CPT | Mod: S$PBB,,, | Performed by: FAMILY MEDICINE

## 2023-06-07 PROCEDURE — 99451 PR INTERPROF, PHONE/INTERNET/EHR, CONSULT, >= 5MINS: ICD-10-PCS | Mod: S$PBB,,, | Performed by: OTOLARYNGOLOGY

## 2023-06-07 PROCEDURE — 99451 NTRPROF PH1/NTRNET/EHR 5/>: CPT | Mod: S$PBB,,, | Performed by: OTOLARYNGOLOGY

## 2023-06-07 PROCEDURE — 99452: ICD-10-PCS | Mod: S$PBB,,, | Performed by: FAMILY MEDICINE

## 2023-06-07 RX ORDER — TRAMADOL HYDROCHLORIDE 50 MG/1
TABLET ORAL
Qty: 21 TABLET | Refills: 0 | Status: SHIPPED | OUTPATIENT
Start: 2023-06-07 | End: 2023-09-06 | Stop reason: SDUPTHER

## 2023-06-07 NOTE — CONSULTS
[facility] - ENT  Response for E-Consult     Patient Name: Marcie Bazan  MRN: [id number]  Primary Care Provider: ETHAN Munoz MD   Requesting Provider: ETHAN Munoz MD  E-Consult to ENT  Consult performed by: Fercho Chapin MD  Consult ordered by: ETHAN Munoz MD  Reason for consult: Multiple thyroid nodules  Assessment/Recommendations:   - Continue surveillance with yearly ultrasound.   - Characteristics of nodules on ultrasound are reassuring in appearance.   - If nodule(s) grow to > 2 cm or architectural change as deemed by radiologist, recommend FNA of that nodule(s.) If FNA is benign, can continue surveillance and spread out imaging interval. If another result, refer to ENT / General Surgery.  - Yearly TSH  - inquire about family history of thyroid cancer, would signify need for continued monitoring      Findings:   Personally reviewed the ultrasound from 2023 and 2021.    The thyroid is homogeneous in echo texture.  There are multiple nodules which are isoechoic, well defined, well circumscribed and no internal calcification or degeneration on available images. In comparison with image from 2021, there has not been a significant growth in these nodules. As < 2 cm and benign appearing, monitor unless nodule > 2 cm in size or other change.     TSH normal    I did not speak to the requesting provider verbally about this.     Total time of Consultation: 10 minute    Percentage of time spent on written/verbal discussion: 75%     *This eConsult is based on the clinical data available to me and is furnished without benefit of a physical examination. The eConsult will need to be interpreted in light of any clinical issues or changes in patient status not available to me at the time of filing this eConsults. Significant changes in patient condition or level of acuity should result in immediate formal consultation and reevaluation. Please alert me if you have further questions.    Thank you for your  consult.     Fercho Chapin MD  [facility] - ENT

## 2023-06-07 NOTE — PROGRESS NOTES
Request for ENT E-Consult     Patient Name: Marcie Bazan  (57 y.o.)  MRN: [id number]  Requesting Provider: ETHAN Munoz MD   Primary Care Provider: ETHAN Munoz MD     Consent for e-consult has been obtained from patient, and patient is aware of applicable cost: Yes    Reason for Consult: 4/17/23 thyroid US showed multiple TR 3 nodules bilaterally measuring up to 2 cm on the right and 1.9 cm on the left. Multiple nodules demonstrate a few mm of increased size compared to prior exam on 12/29/21. No prior biopsy.    SPECIFIC QUESTIONS: What do you recommend for interval monitoring or next step evaluation at this point?    KATJA Munoz MD  [facility] [address]  [place] [address]  PH: [phone]  FX: [phone]    Total time spent preparing for this E-consult and/or communicating with the consulting physician was greater than or equal to: 19 minutes. This time was exclusive of any separately billable procedures or separate E&M services for this patient and exclusive of time spent treating any other patients.  #LMECR

## 2023-06-12 ENCOUNTER — TELEPHONE (OUTPATIENT)
Dept: OPHTHALMOLOGY | Facility: CLINIC | Age: 58
End: 2023-06-12
Payer: MEDICAID

## 2023-06-23 DIAGNOSIS — I10 ESSENTIAL HYPERTENSION: Chronic | ICD-10-CM

## 2023-06-23 RX ORDER — NIFEDIPINE 60 MG/1
TABLET, EXTENDED RELEASE ORAL
Qty: 90 TABLET | Refills: 3 | Status: SHIPPED | OUTPATIENT
Start: 2023-06-23 | End: 2024-03-12 | Stop reason: SDUPTHER

## 2023-06-23 NOTE — TELEPHONE ENCOUNTER
Refill Decision Note      Refill Decision Note   Marcie Bazan  is requesting a refill authorization.  Brief Assessment and Rationale for Refill:  Approve     Medication Therapy Plan:         Comments:     Note composed:9:27 AM 06/23/2023             Appointments     Last Visit   3/28/2023 ETHAN Munoz MD   Next Visit   Visit date not found ETHAN Munoz MD

## 2023-06-23 NOTE — TELEPHONE ENCOUNTER
No care due was identified.  [facility] Embedded Care Due Messages. Reference number: [id number].   6/23/2023 4:12:55 AM CDT

## 2023-06-26 ENCOUNTER — TELEPHONE (OUTPATIENT)
Dept: OPHTHALMOLOGY | Facility: CLINIC | Age: 58
End: 2023-06-26
Payer: MEDICAID

## 2023-06-27 ENCOUNTER — OFFICE VISIT (OUTPATIENT)
Dept: PHYSICAL MEDICINE AND REHAB | Facility: CLINIC | Age: 58
End: 2023-06-27
Payer: MEDICAID

## 2023-06-27 VITALS
HEIGHT: 65 IN | RESPIRATION RATE: 14 BRPM | HEART RATE: 103 BPM | DIASTOLIC BLOOD PRESSURE: 93 MMHG | BODY MASS INDEX: 36.99 KG/M2 | SYSTOLIC BLOOD PRESSURE: 139 MMHG | WEIGHT: 222 LBS

## 2023-06-27 DIAGNOSIS — R29.898 COMPLAINTS OF LEG WEAKNESS: ICD-10-CM

## 2023-06-27 PROCEDURE — 95885 MUSC TST DONE W/NERV TST LIM: CPT | Mod: PBBFAC | Performed by: PHYSICAL MEDICINE & REHABILITATION

## 2023-06-27 PROCEDURE — 95908 NRV CNDJ TST 3-4 STUDIES: CPT | Mod: 26,S$PBB,, | Performed by: PHYSICAL MEDICINE & REHABILITATION

## 2023-06-27 PROCEDURE — 20553 NJX 1/MLT TRIGGER POINTS 3/>: CPT | Mod: S$PBB,,, | Performed by: PHYSICAL MEDICINE & REHABILITATION

## 2023-06-27 PROCEDURE — 20553 NJX 1/MLT TRIGGER POINTS 3/>: CPT | Mod: PBBFAC | Performed by: PHYSICAL MEDICINE & REHABILITATION

## 2023-06-27 PROCEDURE — 95885 PR MUSC TST DONE W/NERV TST LIM: ICD-10-PCS | Mod: 26,S$PBB,, | Performed by: PHYSICAL MEDICINE & REHABILITATION

## 2023-06-27 PROCEDURE — 95908 NRV CNDJ TST 3-4 STUDIES: CPT | Mod: PBBFAC

## 2023-06-27 PROCEDURE — 95908 PR NERVE CONDUCTION STUDY; 3-4 STUDIES: ICD-10-PCS | Mod: 26,S$PBB,, | Performed by: PHYSICAL MEDICINE & REHABILITATION

## 2023-06-27 PROCEDURE — 99213 OFFICE O/P EST LOW 20 MIN: CPT | Mod: PBBFAC,25 | Performed by: PHYSICAL MEDICINE & REHABILITATION

## 2023-06-27 PROCEDURE — 99999 PR PBB SHADOW E&M-EST. PATIENT-LVL III: ICD-10-PCS | Mod: PBBFAC,,, | Performed by: PHYSICAL MEDICINE & REHABILITATION

## 2023-06-27 PROCEDURE — 95885 MUSC TST DONE W/NERV TST LIM: CPT | Mod: 26,S$PBB,, | Performed by: PHYSICAL MEDICINE & REHABILITATION

## 2023-06-27 PROCEDURE — 99499 UNLISTED E&M SERVICE: CPT | Mod: S$PBB,,, | Performed by: PHYSICAL MEDICINE & REHABILITATION

## 2023-06-27 PROCEDURE — 20553 PR INJECT TRIGGER POINTS, > 3: ICD-10-PCS | Mod: S$PBB,,, | Performed by: PHYSICAL MEDICINE & REHABILITATION

## 2023-06-27 PROCEDURE — 99499 NO LOS: ICD-10-PCS | Mod: S$PBB,,, | Performed by: PHYSICAL MEDICINE & REHABILITATION

## 2023-06-27 PROCEDURE — 99999 PR PBB SHADOW E&M-EST. PATIENT-LVL III: CPT | Mod: PBBFAC,,, | Performed by: PHYSICAL MEDICINE & REHABILITATION

## 2023-06-27 NOTE — PROGRESS NOTES
[facility]   [address]  [place] [address]  Phone: [phone]        Full Name: Marcie Bazan YOB: 1965  Patient ID: [id number]      Visit Date: 6/27/2023 14:39  Age: 57 Years  Examining Physician:   Referring Physician: DEMETRIUS Saini  Conclusion: leg pain    Chief Complaint   Patient presents with    Back Pain     Into right leg       HPI: This is a 57 y.o.  female being seen in clinic today for evaluation of chronic low back achy pain with radiation into her legs-worse on the right.  Her symptoms are present at rest and with activity.  She feels numbness and pain into her right lateral leg to the top of her foot.     History obtained from patient    Past family, medical, social, and surgical history reviewed in chart    Review of Systems:     General- denies lethargy, weight change, fever, chills  Head/neck- denies swallowing difficulties  ENT- denies hearing changes  Cardiovascular-denies chest pain  Pulmonary- denies shortness of breath  GI- chronic constipation on bowel incontinence  - denies bladder incontinence  Skin- denies wounds or rashes  Musculoskeletal- denies weakness, + pain  Neurologic- + numbness and tingling  Psychiatric- denies depressive or psychotic features, denies anxiety  Lymphatic-denies swelling  Endocrine- denies hypoglycemic symptoms/DM history  All other pertinent systems negative     Physical Examination:  General: Well developed, well nourished female, NAD  HEENT:NCAT EOMI bilaterally   Pulmonary:Normal respirations    Spinal Examination: CERVICAL  Active ROM is within normal limits.  Inspection: No deformity of spinal alignment.    Spinal Examination: LUMBAR or THORACIC  Active ROM is limited at endranges  Inspection: No deformity of spinal alignment.      Bilateral Upper and Lower Extremities:  Pulses are 2+ at radial bilaterally.  Shoulder/Elbow/Wrist/Hand ROM   Hip/Knee/Ankle ROM wnl  Bilateral Extremities show normal capillary  refill.  No signs of cyanosis, rubor, edema, skin changes, or dysvascular changes of appendages.  Nails appear intact.    Neurological Exam:  Cranial Nerves:  II-XII grossly intact    Manual Muscle Testing: (Motor 5=normal)  5/5 strength bilateral lower extremities    No focal atrophy is noted of either lower extremity.    Bilateral Reflexes:  No clonus at knee or ankle.    Sensation: tested to light touch  - intact in legs    Gait: Narrow base and good arm swing.      Entire procedure explained to patient prior to proceeding.  Verbal consent obtained        Sensory NCS      Nerve / Sites Rec. Site Onset Lat Peak Lat NP Amp PP Amp Segments Distance Velocity     ms ms µV µV  mm m/s   R Sural - Ankle (Calf)      Calf Ankle 2.75 3.31 10.2  Calf - Ankle 140 51   R Superficial peroneal - Ankle      Lat leg Ankle 2.13 2.75 7.5 7.9 Lat leg - Ankle 140 66           Motor NCS      Nerve / Sites Muscle Latency Amplitude Amp % Duration Segments Distance Lat Diff Velocity     ms mV % ms  mm ms m/s   R Peroneal - EDB      Ankle EDB 4.25 4.4 100 5.31 Ankle - EDB 80        Fib head EDB 9.02 4.1 94.2 6.48 Fib head - Ankle 290 4.77 61      Pop fossa EDB 10.38 4.3 97.9 6.23 Pop fossa - Fib head 80 1.35 59   R Tibial - AH      Ankle AH 3.27 8.6 100 4.79 Ankle - AH 80        Pop fossa AH 11.46 5.6 65.8 5.65 Pop fossa - Ankle 380 8.19 46           EMG Summary Table     Spontaneous MUAP Recruitment   Muscle Nerve Roots IA Fib PSW Fasc H.F. Amp Dur. PPP Pattern   R. Rectus femoris Femoral L2-L4 N None None None None N N N N   R. Extensor digitorum brevis Tibial L5-S1 N None None None None 1+ N 1+ Reduced   R. Abductor hallucis Tibial S1-S2 N None None None None N 1+ 1+ 1+         INTERPRETATION  -Right superficial peroneal sensory nerve conduction study showed normal peak latency and amplitude  -Right sural sensory nerve conduction study showed normal peak latency and amplitude  -Right peroneal motor nerve conduction study showed normal  latency, amplitude, and conduction velocity  -Right tibial motor nerve conduction study showed normal latency, amplitude, and conduction velocity  -Needle EMG examination performed to above mentioned muscles       IMPRESSION  ABNORMAL study  2. There is electrodiagnostic evidence of a chronic radiculopathy of the L5 and S1 nerve roots    PLAN  Discussed in detail for greater than 30 minutes about diagnosis and treatment plan    1. Follow up with referring provider: Emilee Buitrago  2. Handouts on lumbar radic and back exercises provided  3. This study is good for one year. If symptoms worsen or do not improve, please re-consult.    Melanie Henry M.D.  Physical Medicine and Rehab

## 2023-07-05 DIAGNOSIS — G89.4 CHRONIC PAIN SYNDROME: ICD-10-CM

## 2023-07-06 RX ORDER — DULOXETIN HYDROCHLORIDE 20 MG/1
CAPSULE, DELAYED RELEASE ORAL
Qty: 30 CAPSULE | Refills: 0 | Status: SHIPPED | OUTPATIENT
Start: 2023-07-06 | End: 2023-08-04

## 2023-07-06 NOTE — TELEPHONE ENCOUNTER
Called patient regarding Rx refill for Cymbalta received from pt pharmacy. Pt states she does need a refill, takes medication 1 capsule at night. Pharmacy verified. Appointment for EMG review and further refills scheduled.

## 2023-07-13 ENCOUNTER — TELEPHONE (OUTPATIENT)
Dept: PAIN MEDICINE | Facility: CLINIC | Age: 58
End: 2023-07-13
Payer: MEDICAID

## 2023-07-13 ENCOUNTER — OFFICE VISIT (OUTPATIENT)
Dept: HEPATOLOGY | Facility: CLINIC | Age: 58
End: 2023-07-13
Payer: MEDICAID

## 2023-07-13 VITALS — WEIGHT: 210 LBS | HEIGHT: 65 IN | BODY MASS INDEX: 34.99 KG/M2

## 2023-07-13 DIAGNOSIS — Z94.4 LIVER TRANSPLANTED: ICD-10-CM

## 2023-07-13 DIAGNOSIS — D84.9 IMMUNOSUPPRESSION: Primary | ICD-10-CM

## 2023-07-13 PROCEDURE — 1160F PR REVIEW ALL MEDS BY PRESCRIBER/CLIN PHARMACIST DOCUMENTED: ICD-10-PCS | Mod: CPTII,95,, | Performed by: INTERNAL MEDICINE

## 2023-07-13 PROCEDURE — 3008F BODY MASS INDEX DOCD: CPT | Mod: CPTII,95,, | Performed by: INTERNAL MEDICINE

## 2023-07-13 PROCEDURE — 4010F ACE/ARB THERAPY RXD/TAKEN: CPT | Mod: CPTII,95,, | Performed by: INTERNAL MEDICINE

## 2023-07-13 PROCEDURE — 99213 OFFICE O/P EST LOW 20 MIN: CPT | Mod: 95,,, | Performed by: INTERNAL MEDICINE

## 2023-07-13 PROCEDURE — 3044F HG A1C LEVEL LT 7.0%: CPT | Mod: CPTII,95,, | Performed by: INTERNAL MEDICINE

## 2023-07-13 PROCEDURE — 4010F PR ACE/ARB THEARPY RXD/TAKEN: ICD-10-PCS | Mod: CPTII,95,, | Performed by: INTERNAL MEDICINE

## 2023-07-13 PROCEDURE — 99213 PR OFFICE/OUTPT VISIT, EST, LEVL III, 20-29 MIN: ICD-10-PCS | Mod: 95,,, | Performed by: INTERNAL MEDICINE

## 2023-07-13 PROCEDURE — 3008F PR BODY MASS INDEX (BMI) DOCUMENTED: ICD-10-PCS | Mod: CPTII,95,, | Performed by: INTERNAL MEDICINE

## 2023-07-13 PROCEDURE — 1160F RVW MEDS BY RX/DR IN RCRD: CPT | Mod: CPTII,95,, | Performed by: INTERNAL MEDICINE

## 2023-07-13 PROCEDURE — 1159F PR MEDICATION LIST DOCUMENTED IN MEDICAL RECORD: ICD-10-PCS | Mod: CPTII,95,, | Performed by: INTERNAL MEDICINE

## 2023-07-13 PROCEDURE — 3044F PR MOST RECENT HEMOGLOBIN A1C LEVEL <7.0%: ICD-10-PCS | Mod: CPTII,95,, | Performed by: INTERNAL MEDICINE

## 2023-07-13 PROCEDURE — 1159F MED LIST DOCD IN RCRD: CPT | Mod: CPTII,95,, | Performed by: INTERNAL MEDICINE

## 2023-07-13 NOTE — TELEPHONE ENCOUNTER
----- Message from Nahomy Alex sent at 7/13/2023  1:52 PM CDT -----  Contact: Marcie Jack needs a call back at [phone], Regards to her missed virtual visit.    Thanks  Td

## 2023-07-13 NOTE — PROGRESS NOTES
Transplant Hepatology  Liver Transplant Recipient Follow-up    Transplant Date: 12/26/2017  UNOS Native Liver Dx: Alcoholic Cirrhosis    Marcie is here for follow up of her liver transplant.    ORGAN: LIVER  Whole or Partial: whole liver  Donor Type: donation after brain death  CDC High Risk: no  Donor CMV Status: Negative  Donor HCV Status: Negative  Donor HBcAb: Negative  Biliary Anastomosis: end to end  Arterial Anatomy: standard  IVC reconstruction: cavaplasty piggyback  Portal vein status: patent  .    Subjective:     Interval History:  Currently, she is doing well. Current complaints include concerns with weight gain. She remains active as her son is still dealing with renal failure and trying to get on the transplant list.    She reports compliance with meds and no major issues over the past year. She is having trouble to get in to see her PCP.    Review of Systems    Objective:     Physical Exam    WBC   Date Value Ref Range Status   05/30/2023 9.19 3.90 - 12.70 K/uL Final     Hemoglobin   Date Value Ref Range Status   05/30/2023 13.2 12.0 - 16.0 g/dL Final     POC Hematocrit   Date Value Ref Range Status   12/26/2017 28 (L) 36 - 54 %PCV Final     Hematocrit   Date Value Ref Range Status   05/30/2023 40.5 37.0 - 48.5 % Final     Platelets   Date Value Ref Range Status   05/30/2023 262 150 - 450 K/uL Final     BUN   Date Value Ref Range Status   05/30/2023 15 6 - 20 mg/dL Final     Creatinine   Date Value Ref Range Status   05/30/2023 0.9 0.5 - 1.4 mg/dL Final     Glucose   Date Value Ref Range Status   05/30/2023 103 70 - 110 mg/dL Final     Calcium   Date Value Ref Range Status   05/30/2023 8.9 8.7 - 10.5 mg/dL Final     Sodium   Date Value Ref Range Status   05/30/2023 140 136 - 145 mmol/L Final     Potassium   Date Value Ref Range Status   05/30/2023 3.7 3.5 - 5.1 mmol/L Final     Chloride   Date Value Ref Range Status   05/30/2023 103 95 - 110 mmol/L Final     Magnesium   Date Value Ref Range Status    01/18/2023 1.5 (L) 1.6 - 2.6 mg/dL Final     AST   Date Value Ref Range Status   05/30/2023 11 10 - 40 U/L Final     ALT   Date Value Ref Range Status   05/30/2023 11 10 - 44 U/L Final     Alkaline Phosphatase   Date Value Ref Range Status   05/30/2023 175 (H) 55 - 135 U/L Final     Total Bilirubin   Date Value Ref Range Status   05/30/2023 0.7 0.1 - 1.0 mg/dL Final     Comment:     For infants and newborns, interpretation of results should be based  on gestational age, weight and in agreement with clinical  observations.    Premature Infant recommended reference ranges:  Up to 24 hours.............<8.0 mg/dL  Up to 48 hours............<12.0 mg/dL  3-5 days..................<15.0 mg/dL  6-29 days.................<15.0 mg/dL       Albumin   Date Value Ref Range Status   05/30/2023 3.6 3.5 - 5.2 g/dL Final     INR   Date Value Ref Range Status   02/27/2020 1.0 0.8 - 1.2 Final     Comment:     Coumadin Therapy:  2.0 - 3.0 for INR for all indicators except mechanical heart valves  and antiphospholipid syndromes which should use 2.5 - 3.5.       Lab Results   Component Value Date    TACROLIMUS 5.0 05/30/2023           Assessment/Plan:     1. Immunosuppression    2. Liver transplanted        Immunosuppression  -Continue current IS    Liver transplant-good allograft function  -Continue with routine lab monitoring  -Continue with PCP f/u  -Cancer screening- patient advised about increased risks of cancer and regular age appropriate cancer screening    RTC in 1 year    Patient was seen in the liver transplant department at [facility].    The patient location is: [place]  The chief complaint leading to consultation is: s/p liver transplant    Visit type: audiovisual    Face to Face time with patient: 10 minutes  20 minutes of total time spent on the encounter, which includes face to face time and non-face to face time preparing to see the patient (eg, review of tests), Obtaining and/or reviewing  separately obtained history, Documenting clinical information in the electronic or other health record, Independently interpreting results (not separately reported) and communicating results to the patient/family/caregiver, or Care coordination (not separately reported).         Each patient to whom he or she provides medical services by telemedicine is:  (1) informed of the relationship between the physician and patient and the respective role of any other health care provider with respect to management of the patient; and (2) notified that he or she may decline to receive medical services by telemedicine and may withdraw from such care at any time.    Notes:       Joselin Wallace MD           OS Patient Status  Functional Status: 100% - Normal, no complaints, no evidence of disease  Physical Capacity: No Limitations

## 2023-07-18 DIAGNOSIS — M54.16 BILATERAL LUMBAR RADICULOPATHY: ICD-10-CM

## 2023-07-19 ENCOUNTER — PATIENT MESSAGE (OUTPATIENT)
Dept: PAIN MEDICINE | Facility: CLINIC | Age: 58
End: 2023-07-19
Payer: MEDICAID

## 2023-07-19 DIAGNOSIS — M54.16 BILATERAL LUMBAR RADICULOPATHY: ICD-10-CM

## 2023-07-19 RX ORDER — PREGABALIN 225 MG/1
CAPSULE ORAL
Qty: 60 CAPSULE | Refills: 1 | OUTPATIENT
Start: 2023-07-19

## 2023-07-19 RX ORDER — PREGABALIN 225 MG/1
CAPSULE ORAL
Qty: 60 CAPSULE | Refills: 1 | Status: SHIPPED | OUTPATIENT
Start: 2023-07-19 | End: 2023-09-25 | Stop reason: SDUPTHER

## 2023-07-20 ENCOUNTER — PATIENT MESSAGE (OUTPATIENT)
Dept: PAIN MEDICINE | Facility: CLINIC | Age: 58
End: 2023-07-20
Payer: MEDICAID

## 2023-07-20 NOTE — TELEPHONE ENCOUNTER
Call ans spoke to pt the inform that provider was out of the office. Pt stated that she could wait until her appointment to discuss her pain.  .Sunshine CHARLES

## 2023-07-21 ENCOUNTER — TELEPHONE (OUTPATIENT)
Dept: OPHTHALMOLOGY | Facility: CLINIC | Age: 58
End: 2023-07-21
Payer: MEDICAID

## 2023-07-21 NOTE — TELEPHONE ENCOUNTER
Spoke to pt. And reschedule to come in Friday July 28th ----- Message from Lyly Patricio sent at 7/21/2023  :23 AM CDT -----  Regarding: appt access  Contact: self @ [phone]  Pt is calling to reschedule her 10:40 appt.  She says her blood pressure shot up and she does not think she can make the trip.  She may go tot he ED.  There is nothing available.  Pls call with an appt asap. .

## 2023-07-26 NOTE — PROGRESS NOTES
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     Plan:  1. Interventional:   - Schedule right L5/S1 + S1 TF WILBERTO. Patient is not taking prescription blood thinners or ASA.    - S/p Right SIJ + Right piriformis + Right GT bursa injection on 4/18/23 with limited pain relief   - S/p L4/5 IL WILBERTO (with right paramedian approach) on 2/28/23 (4 weeks ago) with limited pain relief.  - S/p right SIJ + right piriformis + right GT bursa injection on 10/11/22 with 75% pain relief, still reporting 9/10 pain.   - S/p Bilateral L4/5 TF WILBERTO on 8/18/22 with 85% pain relief - now localized right SIJ pain.   - S/p bilateral SIJ + bilateral GT bursa + bilateral piriformis injection on 4/21/22 with limited pain relief.  - S/p C5/6 IL WILBERTO on 1/4/22 with 100% pain relief   - S/p bilateral L4/5 TF WILBERTO on 10/28/21 with 90% pain relief   - S/p Bilateral SIJ + Bilateral Piriformis + Bilateral GT Bursa Injection on 4/15/21 with 75% pain relief - but continuing to have leg pain.  - S/p bilateral GT bursa injection on 02/08/2021 with 10% pain relief with Dr. Kaminski.  She feels pain is worse than prior to the procedure.  - S/p right SIJ + piriformis + GT bursa injection on 10/21/20 with 90% pain relief.     2. Pharmacologic:   - Refill Lyrica 225mg BID, amitriptyline 25-50mg QHS (Increased at previous visit), Flexeril 10mg to take 1/2 to 1 tab TID PRN.  - Refill Cymbalta 20 mg QD - started at previous visit, which was initially helping some.  Consider Increase.  - Continue bridge of tramadol 50mg (21  tabs - 7 day supply) to take 1/2 to 1 tab for severe pain.    - Consider retrying naltrexone -- although too expensive in the past.  - No NSAIDs due to h/o transplant.   - Anticoagulation use: None.     3. Rehabilitative: Continue exercises and activities as tolerated. She went to PT, but she felt pain worse when she got home.    4. Diagnostic: BLE EMG/NCS reviewed with patient.    5. Consult/ Referral:   - Previously placed referral to Neurology for new onset headaches for a few months with associated dizziness & nausea.  She saw primary care who told her to ask our dept about the headaches. Per PCP note-believed to be occipital neuralgia. Headaches have resolved, so will hold off for now.    6. Follow up: 4 weeks post-procedure -- virtual visit     - This condition does not require this patient to take time off of work, and the primary goal of our Pain Management services is to improve the patient's functional capacity.   - I discussed the risks, benefits, and alternatives to potential treatment options. All questions and concerns were fully addressed today in clinic.         Emilee Buitrago PA-C  Interventional Pain Management - [facility]    Disclaimer:  This note was prepared using voice recognition system and is likely to have sound alike errors that may have been overlooked even after proof reading.  Please call me with any questions.

## 2023-07-27 ENCOUNTER — OFFICE VISIT (OUTPATIENT)
Dept: PAIN MEDICINE | Facility: CLINIC | Age: 58
End: 2023-07-27
Payer: MEDICAID

## 2023-07-27 VITALS — RESPIRATION RATE: 17 BRPM

## 2023-07-27 DIAGNOSIS — G89.4 CHRONIC PAIN SYNDROME: ICD-10-CM

## 2023-07-27 DIAGNOSIS — M54.16 RIGHT LUMBAR RADICULOPATHY: Primary | ICD-10-CM

## 2023-07-27 DIAGNOSIS — M53.3 SACROILIAC JOINT DYSFUNCTION: ICD-10-CM

## 2023-07-27 DIAGNOSIS — R29.898 COMPLAINTS OF LEG WEAKNESS: ICD-10-CM

## 2023-07-27 PROCEDURE — 99214 OFFICE O/P EST MOD 30 MIN: CPT | Mod: 95,,, | Performed by: PHYSICIAN ASSISTANT

## 2023-07-27 PROCEDURE — 3044F HG A1C LEVEL LT 7.0%: CPT | Mod: CPTII,95,, | Performed by: PHYSICIAN ASSISTANT

## 2023-07-27 PROCEDURE — 1160F RVW MEDS BY RX/DR IN RCRD: CPT | Mod: CPTII,95,, | Performed by: PHYSICIAN ASSISTANT

## 2023-07-27 PROCEDURE — 1159F MED LIST DOCD IN RCRD: CPT | Mod: CPTII,95,, | Performed by: PHYSICIAN ASSISTANT

## 2023-07-27 PROCEDURE — 1159F PR MEDICATION LIST DOCUMENTED IN MEDICAL RECORD: ICD-10-PCS | Mod: CPTII,95,, | Performed by: PHYSICIAN ASSISTANT

## 2023-07-27 PROCEDURE — 99214 PR OFFICE/OUTPT VISIT, EST, LEVL IV, 30-39 MIN: ICD-10-PCS | Mod: 95,,, | Performed by: PHYSICIAN ASSISTANT

## 2023-07-27 PROCEDURE — 1160F PR REVIEW ALL MEDS BY PRESCRIBER/CLIN PHARMACIST DOCUMENTED: ICD-10-PCS | Mod: CPTII,95,, | Performed by: PHYSICIAN ASSISTANT

## 2023-07-27 PROCEDURE — 4010F ACE/ARB THERAPY RXD/TAKEN: CPT | Mod: CPTII,95,, | Performed by: PHYSICIAN ASSISTANT

## 2023-07-27 PROCEDURE — 3044F PR MOST RECENT HEMOGLOBIN A1C LEVEL <7.0%: ICD-10-PCS | Mod: CPTII,95,, | Performed by: PHYSICIAN ASSISTANT

## 2023-07-27 PROCEDURE — 4010F PR ACE/ARB THEARPY RXD/TAKEN: ICD-10-PCS | Mod: CPTII,95,, | Performed by: PHYSICIAN ASSISTANT

## 2023-07-28 ENCOUNTER — TELEPHONE (OUTPATIENT)
Dept: OPHTHALMOLOGY | Facility: CLINIC | Age: 58
End: 2023-07-28
Payer: MEDICAID

## 2023-07-28 ENCOUNTER — OFFICE VISIT (OUTPATIENT)
Dept: OPHTHALMOLOGY | Facility: CLINIC | Age: 58
End: 2023-07-28
Payer: MEDICAID

## 2023-07-28 DIAGNOSIS — H25.11 NUCLEAR SCLEROTIC CATARACT OF RIGHT EYE: Primary | ICD-10-CM

## 2023-07-28 DIAGNOSIS — H25.12 NUCLEAR SCLEROTIC CATARACT OF LEFT EYE: ICD-10-CM

## 2023-07-28 PROCEDURE — 99205 OFFICE O/P NEW HI 60 MIN: CPT | Mod: S$PBB,,, | Performed by: OPHTHALMOLOGY

## 2023-07-28 PROCEDURE — 1159F PR MEDICATION LIST DOCUMENTED IN MEDICAL RECORD: ICD-10-PCS | Mod: CPTII,,, | Performed by: OPHTHALMOLOGY

## 2023-07-28 PROCEDURE — 4010F ACE/ARB THERAPY RXD/TAKEN: CPT | Mod: CPTII,,, | Performed by: OPHTHALMOLOGY

## 2023-07-28 PROCEDURE — 92136 OPHTHALMIC BIOMETRY: CPT | Mod: PBBFAC,RT | Performed by: OPHTHALMOLOGY

## 2023-07-28 PROCEDURE — 4010F PR ACE/ARB THEARPY RXD/TAKEN: ICD-10-PCS | Mod: CPTII,,, | Performed by: OPHTHALMOLOGY

## 2023-07-28 PROCEDURE — 99999 PR PBB SHADOW E&M-EST. PATIENT-LVL III: ICD-10-PCS | Mod: PBBFAC,,, | Performed by: OPHTHALMOLOGY

## 2023-07-28 PROCEDURE — 3044F HG A1C LEVEL LT 7.0%: CPT | Mod: CPTII,,, | Performed by: OPHTHALMOLOGY

## 2023-07-28 PROCEDURE — 3044F PR MOST RECENT HEMOGLOBIN A1C LEVEL <7.0%: ICD-10-PCS | Mod: CPTII,,, | Performed by: OPHTHALMOLOGY

## 2023-07-28 PROCEDURE — 99213 OFFICE O/P EST LOW 20 MIN: CPT | Mod: PBBFAC | Performed by: OPHTHALMOLOGY

## 2023-07-28 PROCEDURE — 99205 PR OFFICE/OUTPT VISIT, NEW, LEVL V, 60-74 MIN: ICD-10-PCS | Mod: S$PBB,,, | Performed by: OPHTHALMOLOGY

## 2023-07-28 PROCEDURE — 92136 IOL MASTER - OD - RIGHT EYE: ICD-10-PCS | Mod: 26,S$PBB,RT, | Performed by: OPHTHALMOLOGY

## 2023-07-28 PROCEDURE — 1159F MED LIST DOCD IN RCRD: CPT | Mod: CPTII,,, | Performed by: OPHTHALMOLOGY

## 2023-07-28 PROCEDURE — 99999 PR PBB SHADOW E&M-EST. PATIENT-LVL III: CPT | Mod: PBBFAC,,, | Performed by: OPHTHALMOLOGY

## 2023-07-28 RX ORDER — PREDNISOLONE ACETATE 10 MG/ML
1 SUSPENSION/ DROPS OPHTHALMIC 3 TIMES DAILY
Qty: 5 ML | Refills: 3 | Status: SHIPPED | OUTPATIENT
Start: 2023-07-28 | End: 2024-03-12

## 2023-07-28 RX ORDER — KETOROLAC TROMETHAMINE 5 MG/ML
1 SOLUTION OPHTHALMIC 3 TIMES DAILY
Qty: 5 ML | Refills: 3 | Status: SHIPPED | OUTPATIENT
Start: 2023-07-28 | End: 2024-01-31

## 2023-07-28 RX ORDER — OFLOXACIN 3 MG/ML
1 SOLUTION/ DROPS OPHTHALMIC 3 TIMES DAILY
Qty: 5 ML | Refills: 3 | Status: SHIPPED | OUTPATIENT
Start: 2023-07-28 | End: 2024-03-12

## 2023-07-28 NOTE — PROGRESS NOTES
HPI    NP Ref Dr. Aldana OD (Cost)    Irregular Astigmatism vs Early Keratoconus     C/o: Pt states  her OD is better than her OS has been that way for a   while.       Zurdo prn ou   Last edited by Elidia Viveros MA on 7/28/2023  8:49 AM.            Assessment /Plan     For exam results, see Encounter Report.    Nuclear sclerotic cataract of right eye  -     IOL Master - OD - Right Eye    Nuclear sclerotic cataract of left eye      Visually significant nuclear sclerotic cataract   - Interfering with activities of daily living.  Pt desires cataract surgery for Va rehabilitation.   - R/B/A discussed and pt agrees to proceed with surgery.   - IOL options discussed according to patient's goals and concomitant ocular pathology; and pt content with monofocal lens.    - Target: plano.    Diboo 20.0 OD    (Diboo 20.5 OS)    Sent for irreg astig - only has some WTR astig, no evidence of FFKCN.

## 2023-07-31 ENCOUNTER — TELEPHONE (OUTPATIENT)
Dept: INTERNAL MEDICINE | Facility: CLINIC | Age: 58
End: 2023-07-31
Payer: MEDICAID

## 2023-07-31 DIAGNOSIS — H25.11 NUCLEAR SCLEROTIC CATARACT OF RIGHT EYE: Primary | ICD-10-CM

## 2023-07-31 NOTE — TELEPHONE ENCOUNTER
----- Message from Alonso Muñoz sent at 7/31/2023 12:17 PM CDT -----  Contact: [phone]  Patient is calling for LAB results. Patient is also needing to schedule an follow up appt.  Please call patient at [phone]. Thanks KB

## 2023-08-01 ENCOUNTER — TELEPHONE (OUTPATIENT)
Dept: PAIN MEDICINE | Facility: CLINIC | Age: 58
End: 2023-08-01
Payer: MEDICAID

## 2023-08-01 ENCOUNTER — TELEPHONE (OUTPATIENT)
Dept: INTERNAL MEDICINE | Facility: CLINIC | Age: 58
End: 2023-08-01
Payer: MEDICAID

## 2023-08-01 RX ORDER — PREDNISOLONE ACETATE-GATIFLOXACIN-BROMFENAC .75; 5; 1 MG/ML; MG/ML; MG/ML
1 SUSPENSION/ DROPS OPHTHALMIC 3 TIMES DAILY
Qty: 5 ML | Refills: 3 | Status: SHIPPED | OUTPATIENT
Start: 2023-08-01 | End: 2024-03-12

## 2023-08-01 NOTE — TELEPHONE ENCOUNTER
Call and spoke with pt regarding scheduling a procedure. Pt procedure was scheduled on August 15 with Dr Caballero.  .Sunshine Clancy [facility]

## 2023-08-01 NOTE — TELEPHONE ENCOUNTER
----- Message from Jillian Melendrez sent at 8/1/2023 10:43 AM CDT -----  Contact: self [phone]  Patient called in this morning requesting an update on her plan of care. Please call back [phone]. Thanks tpw

## 2023-08-01 NOTE — TELEPHONE ENCOUNTER
----- Message from Jillian Melendrez sent at 8/1/2023 10:40 AM CDT -----  Contact: self  Patient called in this morning needing to reschedule her ap-pointment for Friday, but none p-opulated. Please call back [phone] thanks tpw

## 2023-08-03 DIAGNOSIS — G89.4 CHRONIC PAIN SYNDROME: ICD-10-CM

## 2023-08-03 DIAGNOSIS — M79.18 PIRIFORMIS MUSCLE PAIN: ICD-10-CM

## 2023-08-03 NOTE — TELEPHONE ENCOUNTER
Good Morning/Afternoon,     Pt is requesting for a refill of: Duloxetine 20 mg  Last filed: 7/6/23  Last encounter: 7/27/23  Up coming apt: 9/29/23  Pharmacy: [id number]  Is this something you can do?      Hilary Shi  Medical Assistant

## 2023-08-04 RX ORDER — DULOXETIN HYDROCHLORIDE 20 MG/1
CAPSULE, DELAYED RELEASE ORAL
Qty: 30 CAPSULE | Refills: 0 | Status: SHIPPED | OUTPATIENT
Start: 2023-08-04 | End: 2023-09-05

## 2023-08-07 ENCOUNTER — HOSPITAL ENCOUNTER (OUTPATIENT)
Dept: RADIOLOGY | Facility: CLINIC | Age: 58
Discharge: HOME | End: 2023-08-07
Attending: PHYSICIAN ASSISTANT
Payer: MEDICAID

## 2023-08-07 ENCOUNTER — OFFICE VISIT (OUTPATIENT)
Dept: URGENT CARE | Facility: CLINIC | Age: 58
End: 2023-08-07
Payer: MEDICAID

## 2023-08-07 VITALS
RESPIRATION RATE: 12 BRPM | TEMPERATURE: 98 F | BODY MASS INDEX: 34.99 KG/M2 | SYSTOLIC BLOOD PRESSURE: 137 MMHG | DIASTOLIC BLOOD PRESSURE: 86 MMHG | OXYGEN SATURATION: 95 % | WEIGHT: 210 LBS | HEART RATE: 93 BPM | HEIGHT: 65 IN

## 2023-08-07 DIAGNOSIS — M25.562 ACUTE PAIN OF LEFT KNEE: ICD-10-CM
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
## 2023-08-07 DIAGNOSIS — S86.912A KNEE STRAIN, LEFT, INITIAL ENCOUNTER: Primary | ICD-10-CM

## 2023-08-07 PROCEDURE — 73562 XR KNEE 3 VIEW LEFT: ICD-10-PCS | Mod: LT,S$GLB,, | Performed by: STUDENT IN AN ORGANIZED HEALTH CARE EDUCATION/TRAINING PROGRAM

## 2023-08-07 PROCEDURE — 99213 PR OFFICE/OUTPT VISIT, EST, LEVL III, 20-29 MIN: ICD-10-PCS | Mod: S$GLB,,, | Performed by: PHYSICIAN ASSISTANT

## 2023-08-07 PROCEDURE — 73562 X-RAY EXAM OF KNEE 3: CPT | Mod: LT,S$GLB,, | Performed by: STUDENT IN AN ORGANIZED HEALTH CARE EDUCATION/TRAINING PROGRAM

## 2023-08-07 PROCEDURE — 99213 OFFICE O/P EST LOW 20 MIN: CPT | Mod: S$GLB,,, | Performed by: PHYSICIAN ASSISTANT

## 2023-08-07 RX ORDER — CYCLOBENZAPRINE HCL 10 MG
10 TABLET ORAL 3 TIMES DAILY PRN
Qty: 15 TABLET | Refills: 0 | Status: SHIPPED | OUTPATIENT
Start: 2023-08-07 | End: 2023-08-17

## 2023-08-07 NOTE — PROGRESS NOTES
"Subjective:      Patient ID: Marcie Bazan is a 57 y.o. female.    Vitals:  height is 5' 5" (1.651 m) and weight is 95.3 kg (210 lb). Her tympanic temperature is 98 °F (36.7 °C). Her blood pressure is 137/86 and her pulse is 93. Her respiration is 12 and oxygen saturation is 95%.     Chief Complaint: Knee Pain (Left knee)    Patient presents today with injury to her left knee from 2 nights ago. Patient states she was running to the bathroom in the night and fell. Patient fell on her right elbow and left knee. Patient has not taken anything for this yet. Just her regular medications which includes some meds for her chronic pain.  Difficulty ambulating.  No numbness or tingling.    Knee Pain   The incident occurred 12 to 24 hours ago. The incident occurred at home. The injury mechanism was a fall. The pain is present in the left knee. The pain is at a severity of 9/10. The pain is severe. The pain has been Constant since onset. Associated symptoms include an inability to bear weight and a loss of motion. Pertinent negatives include no loss of sensation, muscle weakness, numbness or tingling. She reports no foreign bodies present. The symptoms are aggravated by movement and weight bearing. She has tried nothing for the symptoms. The treatment provided no relief.       Musculoskeletal:  Positive for pain and joint pain.   Neurological:  Negative for numbness.      Objective:     Physical Exam   Constitutional: She is oriented to person, place, and time. She appears well-developed. She is cooperative. No distress.   HENT:   Head: Normocephalic and atraumatic.   Nose: Nose normal.   Mouth/Throat: Oropharynx is clear and moist and mucous membranes are normal.   Eyes: Conjunctivae and lids are normal.   Neck: Trachea normal and phonation normal. Neck supple.   Cardiovascular: Normal rate, regular rhythm, normal heart sounds and normal pulses.   Pulmonary/Chest: Effort normal and breath sounds normal.   Abdominal: Normal " appearance and bowel sounds are normal. She exhibits no mass. Soft.   Musculoskeletal:         General: No deformity.      Right knee: She exhibits decreased range of motion. She exhibits no swelling and no bony tenderness. Tenderness found.      Left knee: Normal.   Neurological: She is alert and oriented to person, place, and time. She has normal strength and normal reflexes. No sensory deficit.   Skin: Skin is warm, dry, intact and not diaphoretic.   Psychiatric: Her speech is normal and behavior is normal. Judgment and thought content normal.   Nursing note and vitals reviewed.      Assessment:     1. Knee strain, left, initial encounter    2. Acute pain of left knee    3. Fall, initial encounter        Plan:       Knee strain, left, initial encounter  -     cyclobenzaprine (FLEXERIL) 10 MG tablet; Take 1 tablet (10 mg total) by mouth 3 (three) times daily as needed for Muscle spasms.  Dispense: 15 tablet; Refill: 0    Acute pain of left knee  -     XR KNEE 3 VIEW LEFT; Future; Expected date: 08/07/2023  -     cyclobenzaprine (FLEXERIL) 10 MG tablet; Take 1 tablet (10 mg total) by mouth 3 (three) times daily as needed for Muscle spasms.  Dispense: 15 tablet; Refill: 0    Fall, initial encounter  -     XR KNEE 3 VIEW LEFT; Future; Expected date: 08/07/2023  -     cyclobenzaprine (FLEXERIL) 10 MG tablet; Take 1 tablet (10 mg total) by mouth 3 (three) times daily as needed for Muscle spasms.  Dispense: 15 tablet; Refill: 0    Knee xray shows no acute fracture or dislocation on wet read.    Refill Flexeril for relief.  Advised rest and no increased activity.  Ultram which was previously prescribed as needed.  Ice and ACE wrap compression.  RTC as needed.  Go to the ER with new or worsening symptoms.

## 2023-08-07 NOTE — PRE-PROCEDURE INSTRUCTIONS
Spoke with patient regarding procedure scheduled on 8.15     Arrival time 1100     Has patient been sick with fever or on antibiotics within the last 7 days? No     Does the patient have any open wounds, sores or rashes? No     Does the patient have any recent fractures? no     Has patient received a vaccination within the last 7 days? No     Received the COVID vaccination? yes     Has the patient stopped all medications as directed? na     Does patient have a pacemaker and or defibrillator? no     Does the patient have a ride to and from procedure and someone reliable to remain with patient? son in law     Is the patient diabetic? no     Does the patient have sleep apnea? Or use O2 at home? tien on cpap     Is the patient receiving sedation? yes     Is the patient instructed to remain NPO beginning at midnight the night before their procedure? yes     Procedure location confirmed with patient? Yes     Covid- Denies signs/symptoms. Instructed to notify PAT/MD if any changes.

## 2023-08-08 ENCOUNTER — TELEPHONE (OUTPATIENT)
Dept: URGENT CARE | Facility: CLINIC | Age: 58
End: 2023-08-08
Payer: MEDICAID

## 2023-08-09 RX ORDER — CYCLOBENZAPRINE HCL 10 MG
TABLET ORAL
Qty: 90 TABLET | Refills: 1 | Status: SHIPPED | OUTPATIENT
Start: 2023-08-09 | End: 2023-09-29 | Stop reason: SDUPTHER

## 2023-08-10 ENCOUNTER — TELEPHONE (OUTPATIENT)
Dept: URGENT CARE | Facility: CLINIC | Age: 58
End: 2023-08-10
Payer: MEDICAID

## 2023-08-10 ENCOUNTER — PATIENT MESSAGE (OUTPATIENT)
Dept: PAIN MEDICINE | Facility: CLINIC | Age: 58
End: 2023-08-10
Payer: MEDICAID

## 2023-08-10 NOTE — TELEPHONE ENCOUNTER
Re Courtesy Call    Spoke w pt, Pt states that pain is not better. She will follow up with her pain management  on next visit.

## 2023-08-11 DIAGNOSIS — M25.569 CHRONIC KNEE PAIN, UNSPECIFIED LATERALITY: Primary | ICD-10-CM

## 2023-08-11 DIAGNOSIS — G89.29 CHRONIC KNEE PAIN, UNSPECIFIED LATERALITY: Primary | ICD-10-CM

## 2023-08-11 RX ORDER — METHYLPREDNISOLONE 4 MG/1
TABLET ORAL
Qty: 21 EACH | Refills: 0 | Status: SHIPPED | OUTPATIENT
Start: 2023-08-11 | End: 2023-08-29

## 2023-08-14 ENCOUNTER — PATIENT MESSAGE (OUTPATIENT)
Dept: OTHER | Facility: OTHER | Age: 58
End: 2023-08-14
Payer: MEDICAID

## 2023-08-15 ENCOUNTER — HOSPITAL ENCOUNTER (OUTPATIENT)
Facility: HOSPITAL | Age: 58
Discharge: HOME | End: 2023-08-15
Attending: PHYSICAL MEDICINE & REHABILITATION | Admitting: PHYSICAL MEDICINE & REHABILITATION
Payer: MEDICAID

## 2023-08-15 VITALS
RESPIRATION RATE: 19 BRPM | BODY MASS INDEX: 36.55 KG/M2 | SYSTOLIC BLOOD PRESSURE: 151 MMHG | HEART RATE: 71 BPM | TEMPERATURE: 97 F | WEIGHT: 219.38 LBS | DIASTOLIC BLOOD PRESSURE: 84 MMHG | OXYGEN SATURATION: 94 % | HEIGHT: 65 IN

## 2023-08-15 DIAGNOSIS — M54.16 LUMBAR RADICULOPATHY: Primary | ICD-10-CM

## 2023-08-15 PROCEDURE — 64483 PR EPIDURAL INJ, ANES/STEROID, TRANSFORAMINAL, LUMB/SACR, SNGL LEVL: ICD-10-PCS | Mod: RT,,, | Performed by: PHYSICAL MEDICINE & REHABILITATION

## 2023-08-15 PROCEDURE — 64484 NJX AA&/STRD TFRM EPI L/S EA: CPT | Mod: RT | Performed by: PHYSICAL MEDICINE & REHABILITATION

## 2023-08-15 PROCEDURE — 64484 PRA INJECT ANES/STEROID FORAMEN LUMBAR/SACRAL W IMG GUIDE ,EA ADD LEVEL: ICD-10-PCS | Mod: RT,,, | Performed by: PHYSICAL MEDICINE & REHABILITATION

## 2023-08-15 PROCEDURE — 25500020 PHARM REV CODE 255: Performed by: PHYSICAL MEDICINE & REHABILITATION

## 2023-08-15 PROCEDURE — 64484 NJX AA&/STRD TFRM EPI L/S EA: CPT | Mod: RT,,, | Performed by: PHYSICAL MEDICINE & REHABILITATION

## 2023-08-15 PROCEDURE — 63600175 PHARM REV CODE 636 W HCPCS: Performed by: PHYSICAL MEDICINE & REHABILITATION

## 2023-08-15 PROCEDURE — 64483 NJX AA&/STRD TFRM EPI L/S 1: CPT | Mod: RT,,, | Performed by: PHYSICAL MEDICINE & REHABILITATION

## 2023-08-15 PROCEDURE — 64483 NJX AA&/STRD TFRM EPI L/S 1: CPT | Mod: RT | Performed by: PHYSICAL MEDICINE & REHABILITATION

## 2023-08-15 RX ORDER — FENTANYL CITRATE 50 UG/ML
INJECTION, SOLUTION INTRAMUSCULAR; INTRAVENOUS
Status: DISCONTINUED | OUTPATIENT
Start: 2023-08-15 | End: 2023-08-15 | Stop reason: HOSPADM

## 2023-08-15 RX ORDER — BUPIVACAINE HYDROCHLORIDE 2.5 MG/ML
INJECTION, SOLUTION EPIDURAL; INFILTRATION; INTRACAUDAL
Status: DISCONTINUED | OUTPATIENT
Start: 2023-08-15 | End: 2023-08-15 | Stop reason: HOSPADM

## 2023-08-15 RX ORDER — METHYLPREDNISOLONE ACETATE 40 MG/ML
INJECTION, SUSPENSION INTRA-ARTICULAR; INTRALESIONAL; INTRAMUSCULAR; SOFT TISSUE
Status: DISCONTINUED | OUTPATIENT
Start: 2023-08-15 | End: 2023-08-15 | Stop reason: HOSPADM

## 2023-08-15 RX ORDER — MIDAZOLAM HYDROCHLORIDE 1 MG/ML
INJECTION, SOLUTION INTRAMUSCULAR; INTRAVENOUS
Status: DISCONTINUED | OUTPATIENT
Start: 2023-08-15 | End: 2023-08-15 | Stop reason: HOSPADM

## 2023-08-15 RX ORDER — ONDANSETRON 2 MG/ML
4 INJECTION INTRAMUSCULAR; INTRAVENOUS ONCE AS NEEDED
Status: DISCONTINUED | OUTPATIENT
Start: 2023-08-15 | End: 2023-08-15 | Stop reason: HOSPADM

## 2023-08-15 NOTE — DISCHARGE INSTRUCTIONS

## 2023-08-15 NOTE — OP NOTE
INFORMED CONSENT: The procedure, risks, benefits and options were discussed with patient. There are no contraindications to the procedure. The patient expressed understanding and agreed to proceed. The personnel performing the procedure was discussed.    08/15/2023    Surgeon: Ras Caballero MD    Assistants: None    Sedation: Conscious sedation provided by M.D    The patient was monitored with continuous pulse oximetry, EKG, and intermittent blood pressure monitors, immediately prior to administration of sedation.  The patient was hemodynamically stable throughout the entire process was responsive to voice, and breathing spontaneously.  Supplemental O2 was provided at 2L/min via nasal cannula.  Patient was comfortable for the duration of the procedure.     There was a total of 2mg IV Midazolam and 75mcg Fentanyl titrated for the procedure    Total sedation time was >10minutes and <20minutes      PROCEDURE:    1)  Right  L5-S1 TRANSFORAMINAL EPIDURAL STEROID INJECTION    2)  Right  S1 TRANSFORAMINAL EPIDURAL STEROID INJECTION    Pre Procedure diagnosis:    Right  L5 and S1  Right lumbar radiculopathy [M54.16]    Post-Procedure diagnosis:   same    Complications: None    Specimens: None      DESCRIPTION OF PROCEDURE: The patient was brought to the procedure room. IV access was obtained prior to the procedure. The patient was positioned prone on the fluoroscopy table. Continuous hemodynamic monitoring was initiated including blood pressure, EKG, and pulse oximetry. . The skin was prepped with chlorhexidine and draped in a sterile fashion. Skin anesthesia was achieved using a total of 10mL of lidocaine, 5mL over each respective injection site.     The  L5-S1 and S1  transforaminal spaces were identified with fluoroscopy in the  AP, oblique, and lateral views.  A 22 gauge spinal quinke needle was then advanced into the area of the trans foraminal spaces at the above levels with confirmation of proper needle position  using AP, oblique, and lateral fluoroscopic views. Once the needle tip was in the area of the transforaminal space, and there was no blood, CSF or paraesthesias,  1 mL of Omnipaque 300mg/ml was injected on each side for a total of 2 mL.  Fluoroscopic imaging in the AP and lateral views revealed a clear outline of the spinal nerve with proximal spread of agent through the neural foramen into the epidural space. A total combination of 1 mL of Bupivicaine 0.25% and 40 mg depo medrol was injected into each level for a total of 4mL of injected medications with displacement of the contrast dye confirming that the medication went into the area of the transforaminal spaces at each level. A sterile dressing was applied.   Patient tolerated the procedure well.    Patient was taken back to the recovery room for further observation.     The patient was discharged to home in stable condition

## 2023-08-15 NOTE — H&P
HPI  Patient presenting for Procedure(s) (LRB):  right L5/S1 + S1 TF WILBERTO (Right)     Patient on Anti-coagulation No    No health changes since previous encounter    Past Medical History:   Diagnosis Date    Alcohol abuse     Alcoholic hepatitis     Anemia of chronic disease     Arthritis     generialized    Cancer 12/26/2017    liver    Coagulopathy     Dyspnea on exertion 3/26/2020    Encounter for blood transfusion     End stage liver disease     History of alcohol abuse     Hyperlipidemia     Hypersomnia 9/11/2020    Hypertension     Hyponatremia     Liver cirrhosis     Liver transplant candidate 12/26/2017    Obesity (BMI 30-39.9) 1/5/2016    Prediabetes 4/22/2023    Sleep apnea      Past Surgical History:   Procedure Laterality Date    BILATERAL SALPINGO-OOPHORECTOMY (BSO) Bilateral 1986    with hysterectomy done for abnormal cells    BREAST BIOPSY Left     benign    BREAST LUMPECTOMY      patient is unsure of date or laterality    CHOLECYSTECTOMY      COLONOSCOPY N/A 12/01/2017    Procedure: COLONOSCOPY;  Surgeon: Filemon Fuentes MD;  [facility]  Service: Endoscopy;  Laterality: N/A;    COLONOSCOPY N/A 12/05/2017    Procedure: COLONOSCOPY;  Surgeon: José Chavira MD;  [facility]  Service: Endoscopy;  Laterality: N/A;    COLONOSCOPY N/A 12/12/2022    Procedure: COLONOSCOPY;  Surgeon: Gogo Brown MD;  Location: [facility] ENDO;  Service: Endoscopy;  Laterality: N/A;    EPIDURAL STEROID INJECTION N/A 2/28/2023    Procedure: L4/5 IL WILBERTO (with right paramedian approach);  Surgeon: Ras Caballero MD;  Location: [facility] PAIN MGT;  Service: Pain Management;  Laterality: N/A;    EPIDURAL STEROID INJECTION INTO CERVICAL SPINE N/A 01/04/2022    Procedure: C5/6 IL WILBERTO;  Surgeon: Ras Caballero MD;  Location: [facility] PAIN MGT;  Service: Pain Management;  Laterality: N/A;    EXAMINATION UNDER ANESTHESIA N/A 02/18/2020    Procedure: EXAM UNDER ANESTHESIA;  Surgeon: Alden OH  MD Carlos Manuel;  Location: [facility] OR;  Service: General;  Laterality: N/A;    EXCISIONAL HEMORRHOIDECTOMY N/A 02/18/2020    Procedure: HEMORRHOIDECTOMY;  Surgeon: Alden Horowitz MD;  Location: [facility] OR;  Service: General;  Laterality: N/A;    HYSTERECTOMY  1986    due to abnormal paps - ovaries were removed    INJECTION OF ANESTHETIC AGENT AROUND PUDENDAL NERVE N/A 02/18/2020    Procedure: BLOCK, NERVE, PUDENDAL;  Surgeon: Alden Horowitz MD;  Location: [facility] OR;  Service: General;  Laterality: N/A;    INJECTION OF ANESTHETIC AGENT INTO SACROILIAC JOINT Right 06/14/2019    Procedure: right Sacroiliac Joint Injection;  Surgeon: David Desai MD;  Location: [facility] PAIN MGT;  Service: Pain Management;  Laterality: Right;    INJECTION OF ANESTHETIC AGENT INTO SACROILIAC JOINT Bilateral 02/07/2020    Procedure: Bilateral GT bursa + Bilateral Sacroiliac Joint Injection;  Surgeon: David Desai MD;  Location: [facility] PAIN MGT;  Service: Pain Management;  Laterality: Bilateral;    INJECTION OF ANESTHETIC AGENT INTO SACROILIAC JOINT Bilateral 07/07/2020    Procedure: Bilateral GT bursa +SIJ injection;  Surgeon: David Desai MD;  Location: [facility] PAIN MGT;  Service: Pain Management;  Laterality: Bilateral;    INJECTION OF ANESTHETIC AGENT INTO SACROILIAC JOINT Right 10/21/2020    Procedure: Right piriformis + right SIJ + right GT bursa injection;  Surgeon: David Desai MD;  Location: [facility] PAIN MGT;  Service: Pain Management;  Laterality: Right;    INJECTION OF ANESTHETIC AGENT INTO SACROILIAC JOINT Bilateral 04/15/2021    Procedure: Bilateral SIJ + Bilateral Piriformis + Bilateral GT Bursa Injection;  Surgeon: Flaco Frazier MD;  Location: [facility] PAIN MGT;  Service: Pain Management;  Laterality: Bilateral;    INJECTION OF ANESTHETIC AGENT INTO SACROILIAC JOINT Bilateral 04/21/2022    Procedure: Bilateral SIJ + Bilateral Piriformis + Bilateral GT Bursa Injection;  Surgeon: Ras Caballero MD;  Location: [facility] PAIN MGT;   Service: Pain Management;  Laterality: Bilateral;    INJECTION OF ANESTHETIC AGENT INTO SACROILIAC JOINT Right 4/18/2023    Procedure: Right SIJ + Right piriformis +/- Right GT bursa injection;  Surgeon: Ras Caballero MD;  Location: [facility] PAIN MGT;  Service: Pain Management;  Laterality: Right;    INJECTION OF JOINT Bilateral 02/07/2020    Procedure: Bilateral GT bursa + Bilateral Sacroiliac Joint Injection;  Surgeon: David Desai MD;  Location: [facility] PAIN MGT;  Service: Pain Management;  Laterality: Bilateral;    INJECTION OF JOINT Bilateral 07/07/2020    Procedure: Bilateral GT bursa +SIJ injection;  Surgeon: David Desai MD;  Location: [facility] PAIN MGT;  Service: Pain Management;  Laterality: Bilateral;    INJECTION OF JOINT Right 10/21/2020    Procedure: Right piriformis + right SIJ + right GT bursa injection;  Surgeon: David Desai MD;  Location: [facility] PAIN MGT;  Service: Pain Management;  Laterality: Right;    INJECTION OF JOINT Bilateral 02/08/2021    Procedure: Bilateral GT Bursa Injection;  Surgeon: Anna Kaminski MD;  Location: [facility] PAIN MGT;  Service: Pain Management;  Laterality: Bilateral;    INJECTION OF JOINT Bilateral 04/15/2021    Procedure: Bilateral SIJ + Bilateral Piriformis + Bilateral GT Bursa Injection;  Surgeon: Flaco Frazier MD;  Location: [facility] PAIN MGT;  Service: Pain Management;  Laterality: Bilateral;    INJECTION OF JOINT Bilateral 04/21/2022    Procedure: Bilateral SIJ + Bilateral Piriformis + Bilateral GT Bursa Injection;  Surgeon: Ras Caballero MD;  Location: [facility] PAIN MGT;  Service: Pain Management;  Laterality: Bilateral;    INJECTION OF JOINT Right 10/11/2022    Procedure: right SIJ + right piriformis + right GT bursa injection;  Surgeon: Ras Caballero MD;  Location: [facility] PAIN MGT;  Service: Pain Management;  Laterality: Right;    INJECTION OF PIRIFORMIS MUSCLE Right 06/14/2019    Procedure: Right piriformis injection;  Surgeon: David Desai MD;  Location:  [facility] PAIN MGT;  Service: Pain Management;  Laterality: Right;    INJECTION OF PIRIFORMIS MUSCLE Right 10/21/2020    Procedure: Right piriformis + right SIJ + right GT bursa injection;  Surgeon: David Desai MD;  Location: [facility] PAIN MGT;  Service: Pain Management;  Laterality: Right;    INJECTION OF PIRIFORMIS MUSCLE Bilateral 04/15/2021    Procedure: Bilateral SIJ + Bilateral Piriformis + Bilateral GT Bursa Injection;  Surgeon: Flaco Frazier MD;  Location: [facility] PAIN MGT;  Service: Pain Management;  Laterality: Bilateral;    INJECTION OF PIRIFORMIS MUSCLE Bilateral 04/21/2022    Procedure: Bilateral SIJ + Bilateral Piriformis + Bilateral GT Bursa Injection;  Surgeon: Ras Caballero MD;  Location: [facility] PAIN MGT;  Service: Pain Management;  Laterality: Bilateral;    LIVER TRANSPLANT  12/2017    SELECTIVE INJECTION OF ANESTHETIC AGENT AROUND LUMBAR SPINAL NERVE ROOT BY TRANSFORAMINAL APPROACH Bilateral 10/28/2021    Procedure: BLOCK, SPINAL NERVE ROOT, LUMBAR, SELECTIVE, TRANSFORAMINAL APPROACH bilateral L4-5 and Right Knee injection with RN IV sedation;  Surgeon: Flaco Frazier MD;  Location: [facility] PAIN MGT;  Service: Pain Management;  Laterality: Bilateral;    SELECTIVE INJECTION OF ANESTHETIC AGENT AROUND LUMBAR SPINAL NERVE ROOT BY TRANSFORAMINAL APPROACH Bilateral 08/18/2022    Procedure: Bilateral L4/5 TF WILBERTO;  Surgeon: Ras Caballero MD;  Location: [facility] PAIN MGT;  Service: Pain Management;  Laterality: Bilateral;     Review of patient's allergies indicates:  No Known Allergies     Current Facility-Administered Medications on File Prior to Encounter   Medication Dose Route Frequency Provider Last Rate Last Admin    ondansetron injection 4 mg  4 mg Intravenous Once PRN Ras Caballero MD        ondansetron injection 4 mg  4 mg Intravenous Once PRN Ras Caballero MD         Current Outpatient Medications on File Prior to Encounter   Medication Sig Dispense Refill    amitriptyline (ELAVIL) 25 MG tablet  TAKE 1 TO 2 TABLETS(25 TO 50 MG) BY MOUTH EVERY NIGHT AS NEEDED FOR INSOMNIA OR PAIN 60 tablet 0    atorvastatin (LIPITOR) 40 MG tablet Take 1 tablet (40 mg total) by mouth every evening. 90 tablet 2    famotidine (PEPCID) 20 MG tablet Take 1 tablet (20 mg total) by mouth 2 (two) times a day. 180 tablet 3    furosemide (LASIX) 40 MG tablet TAKE 1 TABLET BY MOUTH EVERY DAY 90 tablet 5    losartan (COZAAR) 25 MG tablet Take 1 tablet (25 mg total) by mouth once daily. 90 tablet 3    magnesium oxide (MAG-OX) 400 mg (241.3 mg magnesium) tablet TAKE 1 TABLET(400 MG) BY MOUTH TWICE DAILY 60 tablet 2    natrexone tablet 4.5 mg Take 1 tablet (4.5 mg total) by mouth every evening. 30 tablet 2    NIFEdipine (PROCARDIA-XL) 60 MG (OSM) 24 hr tablet TAKE 1 TABLET(60 MG) BY MOUTH EVERY EVENING 90 tablet 3    ondansetron (ZOFRAN) 8 MG tablet Take 1 tablet (8 mg total) by mouth every 12 (twelve) hours as needed for Nausea. 60 tablet 5    pantoprazole (PROTONIX) 40 MG tablet Take 1 tablet (40 mg total) by mouth every morning. 90 tablet 3    potassium chloride (KLOR-CON) 10 MEQ TbSR Take 1 tablet (10 mEq total) by mouth 2 (two) times daily. 180 tablet 1    pregabalin (LYRICA) 225 MG Cap TAKE 1 CAPSULE(225 MG) BY MOUTH TWICE DAILY Strength: 225 mg 60 capsule 1    tacrolimus (PROGRAF) 1 MG Cap TAKE 2 CAPSULES BY MOUTH EVERY MORNING AND 1 CAPSULE EVERY EVENING 90 capsule 2    traMADoL (ULTRAM) 50 mg tablet TAKE 1/2 TO 1 TABLET(25 TO 50 MG) BY MOUTH EVERY 8 HOURS AS NEEDED FOR PAIN 21 tablet 0    ondansetron (ZOFRAN-ODT) 4 MG TbDL ondansetron Take 1 Tablet (oral) 3 times per day PRN - Nausea [id number] tablet,disintegrating 3 times per day oral No set duration recorded No set duration amount recorded active 4 mg          PMHx, PSHx, Allergies, Medications reviewed in epic    ROS negative except pain complaints in HPI    OBJECTIVE:    BP (!) 148/86 (BP Location: Right arm, Patient Position: Sitting)   Pulse 87   Temp 97 °F (36.1 °C)  "(Temporal)   Resp 20   Ht 5' 5" (1.651 m)   Wt 99.5 kg (219 lb 5.7 oz)   LMP 01/01/1985 (Approximate) Comment: 1985  SpO2 96%   BMI 36.50 kg/m²     PHYSICAL EXAMINATION:    GENERAL: Well appearing, in no acute distress, alert and oriented x3.  PSYCH:  Mood and affect appropriate.  SKIN: Skin color, texture, turgor normal, no rashes or lesions which will impact the procedure.  CV: RRR with palpation of the radial artery.  PULM: No evidence of respiratory difficulty, symmetric chest rise. Clear to auscultation.  NEURO: Cranial nerves grossly intact.    Plan:    Proceed with procedure as planned Procedure(s) (LRB):  right L5/S1 + S1 TF WILBERTO (Right)    Ras Caballero MD  08/15/2023            "

## 2023-08-15 NOTE — DISCHARGE SUMMARY
Discharge Note  Short Stay      SUMMARY     Admit Date: 8/15/2023    Attending Physician: Ras Caballero MD        Discharge Physician: Ras Caballero MD        Discharge Date: 8/15/2023 12:22 PM    Procedure(s) (LRB):  right L5/S1 + S1 TF WILBERTO (Right)    Final Diagnosis: Right lumbar radiculopathy [M54.16]    Disposition: Home or self care    Patient Instructions:   Current Discharge Medication List        CONTINUE these medications which have NOT CHANGED    Details   amitriptyline (ELAVIL) 25 MG tablet TAKE 1 TO 2 TABLETS(25 TO 50 MG) BY MOUTH EVERY NIGHT AS NEEDED FOR INSOMNIA OR PAIN  Qty: 60 tablet, Refills: 0    Associated Diagnoses: Insomnia secondary to chronic pain      atorvastatin (LIPITOR) 40 MG tablet Take 1 tablet (40 mg total) by mouth every evening.  Qty: 90 tablet, Refills: 2    Associated Diagnoses: Mixed hyperlipidemia      !! cyclobenzaprine (FLEXERIL) 10 MG tablet TAKE 1/2 TO 1 TABLET BY MOUTH THREE TIMES DAILY AS NEEDED FOR MUSCLE SPASMS  Qty: 90 tablet, Refills: 1    Associated Diagnoses: Piriformis muscle pain      DULoxetine (CYMBALTA) 20 MG capsule TAKE 1 CAPSULE(20 MG) BY MOUTH EVERY DAY  Qty: 30 capsule, Refills: 0    Associated Diagnoses: Chronic pain syndrome      famotidine (PEPCID) 20 MG tablet Take 1 tablet (20 mg total) by mouth 2 (two) times a day.  Qty: 180 tablet, Refills: 3    Comments: -  Associated Diagnoses: Gastroesophageal reflux disease with esophagitis without hemorrhage      furosemide (LASIX) 40 MG tablet TAKE 1 TABLET BY MOUTH EVERY DAY  Qty: 90 tablet, Refills: 5    Comments: **Patient requests 90 days supply**  Associated Diagnoses: Prophylactic immunotherapy; Bilateral leg edema      losartan (COZAAR) 25 MG tablet Take 1 tablet (25 mg total) by mouth once daily.  Qty: 90 tablet, Refills: 3    Comments: -  Associated Diagnoses: Essential hypertension      magnesium oxide (MAG-OX) 400 mg (241.3 mg magnesium) tablet TAKE 1 TABLET(400 MG) BY MOUTH TWICE DAILY  Qty:  60 tablet, Refills: 2    Associated Diagnoses: Hypomagnesemia      methylPREDNISolone (MEDROL DOSEPACK) 4 mg tablet use as directed  Qty: 21 each, Refills: 0    Associated Diagnoses: Chronic knee pain, unspecified laterality      natrexone tablet 4.5 mg Take 1 tablet (4.5 mg total) by mouth every evening.  Qty: 30 tablet, Refills: 2    Associated Diagnoses: Generalized pain      NIFEdipine (PROCARDIA-XL) 60 MG (OSM) 24 hr tablet TAKE 1 TABLET(60 MG) BY MOUTH EVERY EVENING  Qty: 90 tablet, Refills: 3    Associated Diagnoses: Essential hypertension      ondansetron (ZOFRAN) 8 MG tablet Take 1 tablet (8 mg total) by mouth every 12 (twelve) hours as needed for Nausea.  Qty: 60 tablet, Refills: 5    Associated Diagnoses: Chronic nausea      pantoprazole (PROTONIX) 40 MG tablet Take 1 tablet (40 mg total) by mouth every morning.  Qty: 90 tablet, Refills: 3    Associated Diagnoses: Gastroesophageal reflux disease with esophagitis without hemorrhage      potassium chloride (KLOR-CON) 10 MEQ TbSR Take 1 tablet (10 mEq total) by mouth 2 (two) times daily.  Qty: 180 tablet, Refills: 1    Associated Diagnoses: Diuretic-induced hypokalemia      pregabalin (LYRICA) 225 MG Cap TAKE 1 CAPSULE(225 MG) BY MOUTH TWICE DAILY Strength: 225 mg  Qty: 60 capsule, Refills: 1    Associated Diagnoses: Bilateral lumbar radiculopathy      tacrolimus (PROGRAF) 1 MG Cap TAKE 2 CAPSULES BY MOUTH EVERY MORNING AND 1 CAPSULE EVERY EVENING  Qty: 90 capsule, Refills: 2    Associated Diagnoses: Liver replaced by transplant      traMADoL (ULTRAM) 50 mg tablet TAKE 1/2 TO 1 TABLET(25 TO 50 MG) BY MOUTH EVERY 8 HOURS AS NEEDED FOR PAIN  Qty: 21 tablet, Refills: 0    Associated Diagnoses: Right lumbar radiculopathy      !! cyclobenzaprine (FLEXERIL) 10 MG tablet Take 1 tablet (10 mg total) by mouth 3 (three) times daily as needed for Muscle spasms.  Qty: 15 tablet, Refills: 0    Associated Diagnoses: Acute pain of left knee; Fall, initial encounter; Knee  strain, left, initial encounter      ketorolac 0.5% (ACULAR) 0.5 % Drop Place 1 drop into the right eye 3 (three) times daily.  Qty: 5 mL, Refills: 3    Associated Diagnoses: Nuclear sclerotic cataract of right eye      ofloxacin (OCUFLOX) 0.3 % ophthalmic solution Place 1 drop into the right eye 3 (three) times daily.  Qty: 5 mL, Refills: 3    Associated Diagnoses: Nuclear sclerotic cataract of right eye      ondansetron (ZOFRAN-ODT) 4 MG TbDL ondansetron Take 1 Tablet (oral) 3 times per day PRN - Nausea 20220630 tablet,disintegrating 3 times per day oral No set duration recorded No set duration amount recorded active 4 mg      prednisolon/gatiflox/bromfenac (PREDNISOL ACE-GATIFLOX-BROMFEN) 1-0.5-0.075 % DrpS Apply 1 drop to eye 3 (three) times daily.  Qty: 5 mL, Refills: 3    Comments: Place 1 drop into surgical eye 3 times a day  Associated Diagnoses: Nuclear sclerotic cataract of right eye      prednisoLONE acetate (PRED FORTE) 1 % DrpS Place 1 drop into the right eye 3 (three) times daily.  Qty: 5 mL, Refills: 3    Associated Diagnoses: Nuclear sclerotic cataract of right eye       !! - Potential duplicate medications found. Please discuss with provider.              Discharge Diagnosis: Right lumbar radiculopathy [M54.16]  Condition on Discharge: Stable with no complications to procedure   Diet on Discharge: Same as before.  Activity: as per instruction sheet.  Discharge to: Home with a responsible adult.  Follow up: 2-4 weeks       Please call the office at [phone] if you experience any weakness or loss of sensation, fever > 101.5, pain uncontrolled with oral medications, persistent nausea/vomiting/or diarrhea, redness or drainage from the incisions, or any other worrisome concerns. If physician on call was not reached or could not communicate with our office for any reason please go to the nearest emergency department

## 2023-08-17 NOTE — TELEPHONE ENCOUNTER
Dr. Souza reviewed your labs.  Called patient to let her know her labs were ok but Prograf level was too high.  Please decrease Prograf dose to 3 mg bid and repeat labs on Monday, left voicemail   Validate Note Data When Using Inventory: Yes

## 2023-08-23 ENCOUNTER — TELEPHONE (OUTPATIENT)
Dept: OPHTHALMOLOGY | Facility: CLINIC | Age: 58
End: 2023-08-23
Payer: MEDICAID

## 2023-08-23 DIAGNOSIS — H25.12 NUCLEAR SCLEROTIC CATARACT OF LEFT EYE: Primary | ICD-10-CM

## 2023-08-23 RX ORDER — KETOROLAC TROMETHAMINE 5 MG/ML
1 SOLUTION OPHTHALMIC 3 TIMES DAILY
Qty: 5 ML | Refills: 3 | Status: SHIPPED | OUTPATIENT
Start: 2023-08-23 | End: 2024-01-31

## 2023-08-23 RX ORDER — OFLOXACIN 3 MG/ML
1 SOLUTION/ DROPS OPHTHALMIC 3 TIMES DAILY
Qty: 5 ML | Refills: 3 | Status: SHIPPED | OUTPATIENT
Start: 2023-08-23 | End: 2024-01-31

## 2023-08-23 RX ORDER — PREDNISOLONE ACETATE 10 MG/ML
1 SUSPENSION/ DROPS OPHTHALMIC 3 TIMES DAILY
Qty: 5 ML | Refills: 3 | Status: SHIPPED | OUTPATIENT
Start: 2023-08-23 | End: 2024-03-12

## 2023-08-24 DIAGNOSIS — G47.01 INSOMNIA SECONDARY TO CHRONIC PAIN: ICD-10-CM

## 2023-08-24 DIAGNOSIS — G89.29 INSOMNIA SECONDARY TO CHRONIC PAIN: ICD-10-CM

## 2023-08-24 DIAGNOSIS — M54.16 RIGHT LUMBAR RADICULOPATHY: ICD-10-CM

## 2023-08-24 RX ORDER — TRAMADOL HYDROCHLORIDE 50 MG/1
TABLET ORAL
Qty: 21 TABLET | Refills: 0 | OUTPATIENT
Start: 2023-08-24

## 2023-08-24 RX ORDER — AMITRIPTYLINE HYDROCHLORIDE 25 MG/1
TABLET, FILM COATED ORAL
Qty: 60 TABLET | Refills: 0 | Status: SHIPPED | OUTPATIENT
Start: 2023-08-24 | End: 2023-09-21

## 2023-08-24 NOTE — TELEPHONE ENCOUNTER
Can you refill?    Last Visit: 07/27/2023  Next Visit:09/29/2023  Last refill: amitriptyline ( 04/17/2023) , tramadol ( 06/07/2023)  How pt patient is currently taking medication requested:   amitriptyline (ELAVIL) 25 MG tablet 60 tablet 0 4/17/2023  No   Sig: TAKE 1 TO 2 TABLETS(25 TO 50 MG) BY MOUTH EVERY NIGHT AS NEEDED FOR INSOMNIA OR PAIN     traMADoL (ULTRAM) 50 mg tablet 21 tablet 0 6/7/2023  No   Sig: TAKE 1/2 TO 1 TABLET(25 TO 50 MG) BY MOUTH EVERY 8 HOURS AS NEEDED FOR PAIN   Sent to pharmacy as: traMADoL (ULTRAM) 50 mg tablet     Pharmacy: [id number] - [address]  [address] [facility] AT [facility]

## 2023-08-28 ENCOUNTER — TELEPHONE (OUTPATIENT)
Dept: OPHTHALMOLOGY | Facility: CLINIC | Age: 58
End: 2023-08-28
Payer: MEDICAID

## 2023-08-28 NOTE — TELEPHONE ENCOUNTER
Spoke with pt provided arrival time of 7:00 for sx on 8/30/23 with Dr. Mullen @ [place]. Pt has eyedrops and packet.

## 2023-08-29 RX ORDER — TETRACAINE HYDROCHLORIDE 5 MG/ML
1 SOLUTION OPHTHALMIC
Status: CANCELLED | OUTPATIENT
Start: 2023-08-29

## 2023-08-29 RX ORDER — PROPARACAINE HYDROCHLORIDE 5 MG/ML
1 SOLUTION/ DROPS OPHTHALMIC DAILY PRN
Status: CANCELLED | OUTPATIENT
Start: 2023-08-29

## 2023-08-29 RX ORDER — PHENYLEPHRINE HYDROCHLORIDE 100 MG/ML
1 SOLUTION/ DROPS OPHTHALMIC
Status: CANCELLED | OUTPATIENT
Start: 2023-08-29

## 2023-08-29 RX ORDER — MOXIFLOXACIN 5 MG/ML
1 SOLUTION/ DROPS OPHTHALMIC
Status: CANCELLED | OUTPATIENT
Start: 2023-08-29

## 2023-08-29 RX ORDER — SODIUM CHLORIDE 0.9 % (FLUSH) 0.9 %
2 SYRINGE (ML) INJECTION
Status: CANCELLED | OUTPATIENT
Start: 2023-08-29

## 2023-08-29 RX ORDER — CYCLOP/TROP/PROPA/PHEN/KET/WAT 1-1-0.1%
1 DROPS (EA) OPHTHALMIC (EYE)
Status: CANCELLED | OUTPATIENT
Start: 2023-08-29

## 2023-08-29 NOTE — PRE-PROCEDURE INSTRUCTIONS
- Nothing to eat or drink after midinight, except AM meds with small sips of water, Gatorade/Powerade  - Hold all Diabetic meds AM of surgery  - Hold all Insulin AM of surgery  - Hold all Fluid pills AM of surgery  - Hold all non-insulin shots until after surgery (Ozempic, Mounjaro, Trulicity, Victoza, Byetta, Wegovy and Adlyxin) (up to 7 days prior)  - Take all B/P meds, except those that contain a fluid pill  - Hold all vits and herbal meds AM of surgery  - Use inhalers as needed and bring AM of surgery  - Take blood thinner meds AM of surgery  - Use eye drops as directed  - Shower and wash face with dial soap for 3 mins PM prior and AM of surgery  - No powder, lotions, creams, (makeup),  or jewelry    - Wear comfortable clothing (button up shirt)    (Patients ride may not leave while patient is in surgery)    -- 2nd floor surgery ctr at [facility] @ [address] [facility] [place] [address]       Pt voiced understanding

## 2023-08-29 NOTE — H&P
History    Chief complaint:  Painless progressive vision loss    Present Ilness/Diagnosis: Nuclear sclerotic Cataract    Past Medical History:  has a past medical history of Alcohol abuse, Alcoholic hepatitis, Anemia of chronic disease, Arthritis, Cancer (12/26/2017), Coagulopathy, Dyspnea on exertion (3/26/2020), Encounter for blood transfusion, End stage liver disease, History of alcohol abuse, Hyperlipidemia, Hypersomnia (9/11/2020), Hypertension, Hyponatremia, Liver cirrhosis, Liver transplant candidate (12/26/2017), Obesity (BMI 30-39.9) (1/5/2016), Prediabetes (4/22/2023), and Sleep apnea.    Family History/Social History: refer to chart    Allergies: Review of patient's allergies indicates:  No Known Allergies    Current Medications: No current facility-administered medications for this encounter.    Current Outpatient Medications:     amitriptyline (ELAVIL) 25 MG tablet, TAKE 1 TO 2 TABLETS(25 TO 50 MG) BY MOUTH EVERY NIGHT AS NEEDED FOR INSOMNIA OR PAIN, Disp: 60 tablet, Rfl: 0    atorvastatin (LIPITOR) 40 MG tablet, Take 1 tablet (40 mg total) by mouth every evening., Disp: 90 tablet, Rfl: 2    cyclobenzaprine (FLEXERIL) 10 MG tablet, TAKE 1/2 TO 1 TABLET BY MOUTH THREE TIMES DAILY AS NEEDED FOR MUSCLE SPASMS, Disp: 90 tablet, Rfl: 1    DULoxetine (CYMBALTA) 20 MG capsule, TAKE 1 CAPSULE(20 MG) BY MOUTH EVERY DAY, Disp: 30 capsule, Rfl: 0    famotidine (PEPCID) 20 MG tablet, Take 1 tablet (20 mg total) by mouth 2 (two) times a day., Disp: 180 tablet, Rfl: 3    furosemide (LASIX) 40 MG tablet, TAKE 1 TABLET BY MOUTH EVERY DAY, Disp: 90 tablet, Rfl: 5    losartan (COZAAR) 25 MG tablet, Take 1 tablet (25 mg total) by mouth once daily., Disp: 90 tablet, Rfl: 3    magnesium oxide (MAG-OX) 400 mg (241.3 mg magnesium) tablet, TAKE 1 TABLET(400 MG) BY MOUTH TWICE DAILY, Disp: 60 tablet, Rfl: 2    natrexone tablet 4.5 mg, Take 1 tablet (4.5 mg total) by mouth every evening., Disp: 30 tablet, Rfl: 2    NIFEdipine  (PROCARDIA-XL) 60 MG (OSM) 24 hr tablet, TAKE 1 TABLET(60 MG) BY MOUTH EVERY EVENING, Disp: 90 tablet, Rfl: 3    pantoprazole (PROTONIX) 40 MG tablet, Take 1 tablet (40 mg total) by mouth every morning., Disp: 90 tablet, Rfl: 3    potassium chloride (KLOR-CON) 10 MEQ TbSR, Take 1 tablet (10 mEq total) by mouth 2 (two) times daily., Disp: 180 tablet, Rfl: 1    pregabalin (LYRICA) 225 MG Cap, TAKE 1 CAPSULE(225 MG) BY MOUTH TWICE DAILY Strength: 225 mg, Disp: 60 capsule, Rfl: 1    tacrolimus (PROGRAF) 1 MG Cap, TAKE 2 CAPSULES BY MOUTH EVERY MORNING AND 1 CAPSULE EVERY EVENING, Disp: 90 capsule, Rfl: 2    traMADoL (ULTRAM) 50 mg tablet, TAKE 1/2 TO 1 TABLET(25 TO 50 MG) BY MOUTH EVERY 8 HOURS AS NEEDED FOR PAIN, Disp: 21 tablet, Rfl: 0    ketorolac 0.5% (ACULAR) 0.5 % Drop, Place 1 drop into the right eye 3 (three) times daily., Disp: 5 mL, Rfl: 3    ketorolac 0.5% (ACULAR) 0.5 % Drop, Place 1 drop into the left eye 3 (three) times daily., Disp: 5 mL, Rfl: 3    ofloxacin (OCUFLOX) 0.3 % ophthalmic solution, Place 1 drop into the right eye 3 (three) times daily., Disp: 5 mL, Rfl: 3    ofloxacin (OCUFLOX) 0.3 % ophthalmic solution, Place 1 drop into the left eye 3 (three) times daily., Disp: 5 mL, Rfl: 3    ondansetron (ZOFRAN) 8 MG tablet, Take 1 tablet (8 mg total) by mouth every 12 (twelve) hours as needed for Nausea., Disp: 60 tablet, Rfl: 5    ondansetron (ZOFRAN-ODT) 4 MG TbDL, ondansetron Take 1 Tablet (oral) 3 times per day PRN - Nausea [id number] tablet,disintegrating 3 times per day oral No set duration recorded No set duration amount recorded active 4 mg, Disp: , Rfl:     prednisolon/gatiflox/bromfenac (PREDNISOL ACE-GATIFLOX-BROMFEN) 1-0.5-0.075 % DrpS, Apply 1 drop to eye 3 (three) times daily., Disp: 5 mL, Rfl: 3    prednisoLONE acetate (PRED FORTE) 1 % DrpS, Place 1 drop into the right eye 3 (three) times daily., Disp: 5 mL, Rfl: 3    prednisoLONE acetate (PRED FORTE) 1 % DrpS, Place 1 drop into the left  eye 3 (three) times daily., Disp: 5 mL, Rfl: 3    Facility-Administered Medications Ordered in Other Encounters:     ondansetron injection 4 mg, 4 mg, Intravenous, Once PRN, Ras Caballero MD    ondansetron injection 4 mg, 4 mg, Intravenous, Once PRN, Ras Caballero MD    Physical Exam    BP: Vital signs stable  General: No apparent distress  HEENT: nuclear sclerotic cataract  Lungs: adequate respirations  Heart: + pulses  Abdomen: soft  Rectal/pelvic: deferred    Impression: Visually significant Cataract.    See previous clinic notes for surgical indications.    Plan: Phacoemulsification with implantation of Intraocular lens

## 2023-08-30 ENCOUNTER — TELEPHONE (OUTPATIENT)
Dept: OPHTHALMOLOGY | Facility: CLINIC | Age: 58
End: 2023-08-30
Payer: MEDICAID

## 2023-09-03 DIAGNOSIS — Z94.4 LIVER REPLACED BY TRANSPLANT: ICD-10-CM

## 2023-09-05 ENCOUNTER — TELEPHONE (OUTPATIENT)
Dept: PAIN MEDICINE | Facility: CLINIC | Age: 58
End: 2023-09-05
Payer: MEDICAID

## 2023-09-05 RX ORDER — TACROLIMUS 1 MG/1
CAPSULE ORAL
Qty: 90 CAPSULE | Refills: 2 | Status: SHIPPED | OUTPATIENT
Start: 2023-09-05 | End: 2023-10-26 | Stop reason: DRUGHIGH

## 2023-09-05 NOTE — TELEPHONE ENCOUNTER
----- Message from Sri Butler sent at 9/5/2023  2:45 PM CDT -----  Contact: Marcie  Patient is calling in regards to updates. Reports pain treatment have not been effective and would like ot be seen sooner than current appt due to the pain increasing. Also states at last visit discussing visiting an orthopedics provider, and would like a referral placed. Please return call to .[phone].

## 2023-09-06 ENCOUNTER — TELEPHONE (OUTPATIENT)
Dept: OPHTHALMOLOGY | Facility: CLINIC | Age: 58
End: 2023-09-06
Payer: MEDICAID

## 2023-09-06 DIAGNOSIS — M54.16 RIGHT LUMBAR RADICULOPATHY: ICD-10-CM

## 2023-09-06 RX ORDER — TRAMADOL HYDROCHLORIDE 50 MG/1
TABLET ORAL
Qty: 21 TABLET | Refills: 0 | Status: SHIPPED | OUTPATIENT
Start: 2023-09-06 | End: 2023-11-03

## 2023-09-06 NOTE — TELEPHONE ENCOUNTER
----- Message from Carla Spring sent at 9/6/2023 11:56 AM CDT -----  Consult/Advisory    Name Of Caller:Marcie       Contact Preference:[phone]    Nature of call: ptn called regarding he sx or post opt appt she want to know about her appt if she should reschedule

## 2023-09-06 NOTE — TELEPHONE ENCOUNTER
Can you refill? TRAMADOL     Last Visit: 07/27/2023    Next Visit:09/23/2023    Last refill:06/07/2023    How pt patient is currently taking medication requested: TAKE 1/2 TO 1 TABLET(25 TO 50 MG) BY MOUTH EVERY 8 HOURS AS NEEDED FOR PAIN    Pharmacy:  [facility] #[id number] - [place], [place] - [address] AT [facility] [address]

## 2023-09-11 ENCOUNTER — HOSPITAL ENCOUNTER (EMERGENCY)
Facility: HOSPITAL | Age: 58
Discharge: HOME | End: 2023-09-12
Attending: EMERGENCY MEDICINE
Payer: MEDICAID

## 2023-09-11 ENCOUNTER — OFFICE VISIT (OUTPATIENT)
Dept: INTERNAL MEDICINE | Facility: CLINIC | Age: 58
End: 2023-09-11
Payer: MEDICAID

## 2023-09-11 VITALS
SYSTOLIC BLOOD PRESSURE: 142 MMHG | WEIGHT: 223.75 LBS | TEMPERATURE: 97 F | RESPIRATION RATE: 16 BRPM | HEART RATE: 94 BPM | OXYGEN SATURATION: 97 % | DIASTOLIC BLOOD PRESSURE: 80 MMHG | BODY MASS INDEX: 37.28 KG/M2 | HEIGHT: 65 IN

## 2023-09-11 DIAGNOSIS — I10 ESSENTIAL HYPERTENSION: Chronic | ICD-10-CM

## 2023-09-11 DIAGNOSIS — I49.9 CARDIAC ARRHYTHMIA, UNSPECIFIED CARDIAC ARRHYTHMIA TYPE: ICD-10-CM

## 2023-09-11 DIAGNOSIS — D50.0 IRON DEFICIENCY ANEMIA DUE TO CHRONIC BLOOD LOSS: Chronic | ICD-10-CM

## 2023-09-11 DIAGNOSIS — G47.33 OSA (OBSTRUCTIVE SLEEP APNEA): ICD-10-CM

## 2023-09-11 DIAGNOSIS — E04.2 MULTINODULAR THYROID: ICD-10-CM

## 2023-09-11 DIAGNOSIS — D69.6 THROMBOCYTOPENIA: Chronic | ICD-10-CM

## 2023-09-11 DIAGNOSIS — R13.10 DYSPHAGIA, UNSPECIFIED TYPE: Primary | ICD-10-CM

## 2023-09-11 DIAGNOSIS — M54.42 LOW BACK PAIN WITH BILATERAL SCIATICA, UNSPECIFIED BACK PAIN LATERALITY, UNSPECIFIED CHRONICITY: Primary | ICD-10-CM

## 2023-09-11 DIAGNOSIS — R06.02 SHORTNESS OF BREATH: ICD-10-CM

## 2023-09-11 DIAGNOSIS — E78.2 MIXED HYPERLIPIDEMIA: ICD-10-CM

## 2023-09-11 DIAGNOSIS — E55.9 VITAMIN D DEFICIENCY: ICD-10-CM

## 2023-09-11 DIAGNOSIS — Z12.31 ENCOUNTER FOR SCREENING MAMMOGRAM FOR MALIGNANT NEOPLASM OF BREAST: ICD-10-CM

## 2023-09-11 DIAGNOSIS — R53.83 FATIGUE, UNSPECIFIED TYPE: ICD-10-CM

## 2023-09-11 DIAGNOSIS — K59.00 CONSTIPATION, UNSPECIFIED CONSTIPATION TYPE: ICD-10-CM

## 2023-09-11 DIAGNOSIS — R73.03 PREDIABETES: Chronic | ICD-10-CM

## 2023-09-11 DIAGNOSIS — R09.82 POSTNASAL DRIP: ICD-10-CM

## 2023-09-11 DIAGNOSIS — R63.5 ABNORMAL WEIGHT GAIN: ICD-10-CM

## 2023-09-11 DIAGNOSIS — M54.41 LOW BACK PAIN WITH BILATERAL SCIATICA, UNSPECIFIED BACK PAIN LATERALITY, UNSPECIFIED CHRONICITY: Primary | ICD-10-CM

## 2023-09-11 PROCEDURE — 99999 PR PBB SHADOW E&M-EST. PATIENT-LVL V: ICD-10-PCS | Mod: PBBFAC,,, | Performed by: PHYSICIAN ASSISTANT

## 2023-09-11 PROCEDURE — 99215 OFFICE O/P EST HI 40 MIN: CPT | Mod: PBBFAC | Performed by: PHYSICIAN ASSISTANT

## 2023-09-11 PROCEDURE — 1159F PR MEDICATION LIST DOCUMENTED IN MEDICAL RECORD: ICD-10-PCS | Mod: CPTII,,, | Performed by: PHYSICIAN ASSISTANT

## 2023-09-11 PROCEDURE — 99999 PR PBB SHADOW E&M-EST. PATIENT-LVL V: CPT | Mod: PBBFAC,,, | Performed by: PHYSICIAN ASSISTANT

## 2023-09-11 PROCEDURE — 4010F PR ACE/ARB THEARPY RXD/TAKEN: ICD-10-PCS | Mod: CPTII,,, | Performed by: PHYSICIAN ASSISTANT

## 2023-09-11 PROCEDURE — 4010F ACE/ARB THERAPY RXD/TAKEN: CPT | Mod: CPTII,,, | Performed by: PHYSICIAN ASSISTANT

## 2023-09-11 PROCEDURE — 1159F MED LIST DOCD IN RCRD: CPT | Mod: CPTII,,, | Performed by: PHYSICIAN ASSISTANT

## 2023-09-11 PROCEDURE — 1160F RVW MEDS BY RX/DR IN RCRD: CPT | Mod: CPTII,,, | Performed by: PHYSICIAN ASSISTANT

## 2023-09-11 PROCEDURE — 3044F PR MOST RECENT HEMOGLOBIN A1C LEVEL <7.0%: ICD-10-PCS | Mod: CPTII,,, | Performed by: PHYSICIAN ASSISTANT

## 2023-09-11 PROCEDURE — 3077F PR MOST RECENT SYSTOLIC BLOOD PRESSURE >= 140 MM HG: ICD-10-PCS | Mod: CPTII,,, | Performed by: PHYSICIAN ASSISTANT

## 2023-09-11 PROCEDURE — 25000003 PHARM REV CODE 250: Performed by: NURSE PRACTITIONER

## 2023-09-11 PROCEDURE — 99214 OFFICE O/P EST MOD 30 MIN: CPT | Mod: S$PBB,,, | Performed by: PHYSICIAN ASSISTANT

## 2023-09-11 PROCEDURE — 1160F PR REVIEW ALL MEDS BY PRESCRIBER/CLIN PHARMACIST DOCUMENTED: ICD-10-PCS | Mod: CPTII,,, | Performed by: PHYSICIAN ASSISTANT

## 2023-09-11 PROCEDURE — 3008F PR BODY MASS INDEX (BMI) DOCUMENTED: ICD-10-PCS | Mod: CPTII,,, | Performed by: PHYSICIAN ASSISTANT

## 2023-09-11 PROCEDURE — 3079F PR MOST RECENT DIASTOLIC BLOOD PRESSURE 80-89 MM HG: ICD-10-PCS | Mod: CPTII,,, | Performed by: PHYSICIAN ASSISTANT

## 2023-09-11 PROCEDURE — 3008F BODY MASS INDEX DOCD: CPT | Mod: CPTII,,, | Performed by: PHYSICIAN ASSISTANT

## 2023-09-11 PROCEDURE — 3079F DIAST BP 80-89 MM HG: CPT | Mod: CPTII,,, | Performed by: PHYSICIAN ASSISTANT

## 2023-09-11 PROCEDURE — 3044F HG A1C LEVEL LT 7.0%: CPT | Mod: CPTII,,, | Performed by: PHYSICIAN ASSISTANT

## 2023-09-11 PROCEDURE — 3077F SYST BP >= 140 MM HG: CPT | Mod: CPTII,,, | Performed by: PHYSICIAN ASSISTANT

## 2023-09-11 PROCEDURE — 99214 PR OFFICE/OUTPT VISIT, EST, LEVL IV, 30-39 MIN: ICD-10-PCS | Mod: S$PBB,,, | Performed by: PHYSICIAN ASSISTANT

## 2023-09-11 PROCEDURE — 99284 EMERGENCY DEPT VISIT MOD MDM: CPT | Mod: 27

## 2023-09-11 RX ORDER — LEVOCETIRIZINE DIHYDROCHLORIDE 5 MG/1
5 TABLET, FILM COATED ORAL NIGHTLY
Qty: 30 TABLET | Refills: 11 | Status: SHIPPED | OUTPATIENT
Start: 2023-09-11 | End: 2024-03-12 | Stop reason: SDUPTHER

## 2023-09-11 RX ORDER — HYDROCODONE BITARTRATE AND ACETAMINOPHEN 5; 325 MG/1; MG/1
1 TABLET ORAL
Status: COMPLETED | OUTPATIENT
Start: 2023-09-11 | End: 2023-09-11

## 2023-09-11 RX ORDER — FLUTICASONE PROPIONATE 50 MCG
1 SPRAY, SUSPENSION (ML) NASAL DAILY
Qty: 9.9 ML | Refills: 3 | Status: SHIPPED | OUTPATIENT
Start: 2023-09-11 | End: 2024-01-10

## 2023-09-11 RX ADMIN — HYDROCODONE BITARTRATE AND ACETAMINOPHEN 1 TABLET: 5; 325 TABLET ORAL at 11:09

## 2023-09-11 NOTE — PROGRESS NOTES
Subjective:      Patient ID: Marcie Bazan is a 57 y.o. female.    Chief Complaint: Follow-up    HPI  Here today for a routine follow up. Multiple complaints to discuss today.  Fatigue and weight gain. Ongoing chronic shortness of breath. Choking.  Choking in her sleep. Choking during the day as well. Doesn't have to be eating or drinking but still chokes.   On protonix for GERD. Pt reports that her GERD is controlled on the medication.   BRANNON on cpap nightly.   Constant postnasal drip. Not on anything for that.   Pt enrolled in digital HTN program and BP mostly in good range.   Chronic shortness of breath with just sitting and exertion. Oxygen in good range. Occasionally hears wheezing in her lungs. No chest pain.  Chronic pain, seeing specialist.     Wt Readings from Last 3 Encounters:   09/11/23 0825 101.5 kg (223 lb 12.3 oz)   08/15/23 1057 99.5 kg (219 lb 5.7 oz)   08/07/23 1854 95.3 kg (210 lb)      Patient Active Problem List   Diagnosis    Allergic rhinitis    Anemia of chronic disease    Erosive esophagitis    Essential hypertension    History of abnormal cervical Pap smear    History of Helicobacter pylori infection    Lipoma    Multinodular thyroid    Diuretic-induced hypokalemia    Hypomagnesemia    Thrombocytopenia    Liver replaced by transplant    Immunosuppression    At risk for opportunistic infections    Long-term use of immunosuppressant medication    Vitamin D deficiency    Mixed hyperlipidemia    Adhesive capsulitis of both shoulders    Piriformis syndrome of both sides    Spondylosis of lumbar region without myelopathy or radiculopathy    Class 2 severe obesity due to excess calories with serious comorbidity and body mass index (BMI) of 36.0 to 36.9 in adult    Sacroiliac joint dysfunction    Acute stress reaction    Hypersomnia    BRANNON (obstructive sleep apnea)    Gastroesophageal reflux disease with esophagitis without hemorrhage    Piriformis muscle pain    Long term current use of loop  diuretic    Iron deficiency anemia    Iron deficiency anemia due to chronic blood loss    Decreased strength, endurance, and mobility    Knee pain, bilateral    Choking    Decreased hearing of both ears    Occipital neuralgia of left side    COVID    Chronic constipation    Chronic nausea    Prediabetes         Current Outpatient Medications:     amitriptyline (ELAVIL) 25 MG tablet, TAKE 1 TO 2 TABLETS(25 TO 50 MG) BY MOUTH EVERY NIGHT AS NEEDED FOR INSOMNIA OR PAIN, Disp: 60 tablet, Rfl: 0    atorvastatin (LIPITOR) 40 MG tablet, Take 1 tablet (40 mg total) by mouth every evening., Disp: 90 tablet, Rfl: 2    cyclobenzaprine (FLEXERIL) 10 MG tablet, TAKE 1/2 TO 1 TABLET BY MOUTH THREE TIMES DAILY AS NEEDED FOR MUSCLE SPASMS, Disp: 90 tablet, Rfl: 1    DULoxetine (CYMBALTA) 20 MG capsule, TAKE 1 CAPSULE(20 MG) BY MOUTH EVERY DAY, Disp: 30 capsule, Rfl: 0    famotidine (PEPCID) 20 MG tablet, Take 1 tablet (20 mg total) by mouth 2 (two) times a day., Disp: 180 tablet, Rfl: 3    furosemide (LASIX) 40 MG tablet, TAKE 1 TABLET BY MOUTH EVERY DAY, Disp: 90 tablet, Rfl: 5    ketorolac 0.5% (ACULAR) 0.5 % Drop, Place 1 drop into the right eye 3 (three) times daily., Disp: 5 mL, Rfl: 3    ketorolac 0.5% (ACULAR) 0.5 % Drop, Place 1 drop into the left eye 3 (three) times daily., Disp: 5 mL, Rfl: 3    losartan (COZAAR) 25 MG tablet, Take 1 tablet (25 mg total) by mouth once daily., Disp: 90 tablet, Rfl: 3    magnesium oxide (MAG-OX) 400 mg (241.3 mg magnesium) tablet, TAKE 1 TABLET(400 MG) BY MOUTH TWICE DAILY, Disp: 60 tablet, Rfl: 2    natrexone tablet 4.5 mg, Take 1 tablet (4.5 mg total) by mouth every evening., Disp: 30 tablet, Rfl: 2    NIFEdipine (PROCARDIA-XL) 60 MG (OSM) 24 hr tablet, TAKE 1 TABLET(60 MG) BY MOUTH EVERY EVENING, Disp: 90 tablet, Rfl: 3    ofloxacin (OCUFLOX) 0.3 % ophthalmic solution, Place 1 drop into the right eye 3 (three) times daily., Disp: 5 mL, Rfl: 3    ofloxacin (OCUFLOX) 0.3 % ophthalmic  solution, Place 1 drop into the left eye 3 (three) times daily., Disp: 5 mL, Rfl: 3    ondansetron (ZOFRAN) 8 MG tablet, Take 1 tablet (8 mg total) by mouth every 12 (twelve) hours as needed for Nausea., Disp: 60 tablet, Rfl: 5    ondansetron (ZOFRAN-ODT) 4 MG TbDL, ondansetron Take 1 Tablet (oral) 3 times per day PRN - Nausea [id number] tablet,disintegrating 3 times per day oral No set duration recorded No set duration amount recorded active 4 mg, Disp: , Rfl:     pantoprazole (PROTONIX) 40 MG tablet, Take 1 tablet (40 mg total) by mouth every morning., Disp: 90 tablet, Rfl: 3    potassium chloride (KLOR-CON) 10 MEQ TbSR, Take 1 tablet (10 mEq total) by mouth 2 (two) times daily., Disp: 180 tablet, Rfl: 1    prednisolon/gatiflox/bromfenac (PREDNISOL ACE-GATIFLOX-BROMFEN) 1-0.5-0.075 % DrpS, Apply 1 drop to eye 3 (three) times daily., Disp: 5 mL, Rfl: 3    prednisoLONE acetate (PRED FORTE) 1 % DrpS, Place 1 drop into the right eye 3 (three) times daily., Disp: 5 mL, Rfl: 3    prednisoLONE acetate (PRED FORTE) 1 % DrpS, Place 1 drop into the left eye 3 (three) times daily., Disp: 5 mL, Rfl: 3    pregabalin (LYRICA) 225 MG Cap, TAKE 1 CAPSULE(225 MG) BY MOUTH TWICE DAILY Strength: 225 mg, Disp: 60 capsule, Rfl: 1    tacrolimus (PROGRAF) 1 MG Cap, TAKE 2 CAPSULES BY MOUTH EVERY MORNING AND 1 CAPSULE EVERY EVENING, Disp: 90 capsule, Rfl: 2    traMADoL (ULTRAM) 50 mg tablet, TAKE 1/2 TO 1 TABLET(25 TO 50 MG) BY MOUTH EVERY 8 HOURS AS NEEDED FOR PAIN Strength: 50 mg, Disp: 21 tablet, Rfl: 0    fluticasone propionate (FLONASE) 50 mcg/actuation nasal spray, 1 spray (50 mcg total) by Each Nostril route once daily., Disp: 9.9 mL, Rfl: 3    levocetirizine (XYZAL) 5 MG tablet, Take 1 tablet (5 mg total) by mouth every evening., Disp: 30 tablet, Rfl: 11  No current facility-administered medications for this visit.    Facility-Administered Medications Ordered in Other Visits:     ondansetron injection 4 mg, 4 mg, Intravenous, Once  SAVITAN, Ras Caballero MD    ondansetron injection 4 mg, 4 mg, Intravenous, Once PRN, Ras Caballero MD    Review of Systems   Constitutional:  Positive for fatigue and unexpected weight change. Negative for activity change, appetite change, chills, diaphoresis and fever.   HENT:  Positive for trouble swallowing. Negative for congestion, hearing loss, postnasal drip, rhinorrhea, sore throat and voice change.    Eyes: Negative.  Negative for visual disturbance.   Respiratory:  Positive for shortness of breath and wheezing. Negative for apnea, cough, choking, chest tightness and stridor.    Cardiovascular:  Negative for chest pain, palpitations and leg swelling.   Gastrointestinal:  Positive for abdominal distention and constipation. Negative for abdominal pain, blood in stool, diarrhea, nausea and vomiting.   Endocrine: Negative for cold intolerance, heat intolerance, polydipsia and polyuria.   Genitourinary: Negative.  Negative for difficulty urinating and frequency.   Musculoskeletal:  Positive for arthralgias, back pain and myalgias. Negative for gait problem, joint swelling, neck pain and neck stiffness.   Skin:  Negative for color change, pallor, rash and wound.   Neurological:  Negative for dizziness, tremors, weakness, light-headedness, numbness and headaches.   Hematological:  Negative for adenopathy.   Psychiatric/Behavioral:  Positive for sleep disturbance. Negative for behavioral problems, confusion, decreased concentration, dysphoric mood, hallucinations, self-injury and suicidal ideas. The patient is not nervous/anxious and is not hyperactive.      Physical Exam  Constitutional:       General: She is not in acute distress.     Appearance: Normal appearance. She is obese. She is not ill-appearing, toxic-appearing or diaphoretic.   HENT:      Head: Normocephalic and atraumatic.      Right Ear: Tympanic membrane and ear canal normal. There is no impacted cerumen.      Left Ear: Tympanic membrane and  ear canal normal. There is no impacted cerumen.      Nose: Nose normal. No congestion or rhinorrhea.      Mouth/Throat:      Mouth: Mucous membranes are moist.      Pharynx: No posterior oropharyngeal erythema.   Eyes:      Extraocular Movements: Extraocular movements intact.      Conjunctiva/sclera: Conjunctivae normal.      Pupils: Pupils are equal, round, and reactive to light.   Cardiovascular:      Rate and Rhythm: Normal rate. Rhythm irregular.      Heart sounds: No murmur heard.  Pulmonary:      Effort: Pulmonary effort is normal.      Breath sounds: Normal breath sounds.   Abdominal:      General: There is distension.      Palpations: There is no mass.      Tenderness: There is no abdominal tenderness. There is no guarding.      Hernia: No hernia is present.   Musculoskeletal:      Cervical back: Normal range of motion.   Neurological:      Mental Status: She is alert.   Psychiatric:         Mood and Affect: Mood normal.         Behavior: Behavior normal.         Thought Content: Thought content normal.         Judgment: Judgment normal.        Narrative & Impression  EXAMINATION:  US SOFT TISSUE HEAD NECK THYROID     CLINICAL HISTORY:  Nontoxic multinodular goiter     TECHNIQUE:  Ultrasound of the thyroid was performed.     COMPARISON:  12/29/2021     FINDINGS:  Right thyroid lobe 4.6 x 1.2 x 1.7 cm.  Left thyroid lobe 3.5 x 1.2 x 1.3 cm.  Isthmus 0.3 cm.  Right thyroid cyst 7 mm.  Isoechoic right thyroid nodule 1.9 cm in length.  Isoechoic right thyroid nodule 8 mm.  Left thyroid lobe isoechoic nodule 1.9 cm.  Left thyroid lobe isoechoic nodule 1.5 cm.     Impression:     Multiple TR 3 nodules bilaterally measuring up to 2 cm on the right and 1.9 cm on the left.  Multiple nodules demonstrate a few mm of increased size compared to prior exams.  This could represent benign normally expected benign nodular growth over time.  No other significant interval change.  Continued surveillance recommended.       "  Electronically signed by: Maria Elena Shi MD  Date:                                            04/17/2023  Time:                                           11:26  Objective:   BP (!) 142/80 (BP Location: Right arm, Patient Position: Sitting, BP Method: Large (Manual))   Pulse 94   Temp 96.8 °F (36 °C) (Tympanic)   Resp 16   Ht 5' 5" (1.651 m)   Wt 101.5 kg (223 lb 12.3 oz)   LMP 01/01/1985 (Approximate) Comment: 1985  SpO2 97%   BMI 37.24 kg/m²        Assessment:     1. Dysphagia, unspecified type    2. Postnasal drip    3. Fatigue, unspecified type    4. Abnormal weight gain    5. Prediabetes    6. Iron deficiency anemia due to chronic blood loss    7. Thrombocytopenia    8. Essential hypertension    9. Multinodular thyroid    10. Vitamin D deficiency    11. Mixed hyperlipidemia    12. BRANNON (obstructive sleep apnea)    13. Encounter for screening mammogram for malignant neoplasm of breast    14. Cardiac arrhythmia, unspecified cardiac arrhythmia type    15. Shortness of breath    16. Constipation, unspecified constipation type      Plan:   Dysphagia, unspecified type  -     Fl Modified Barium Swallow Speech; Future; Expected date: 09/11/2023  -     SLP video swallow; Future; Expected date: 09/11/2023  -     Ambulatory referral/consult to Endo Procedure ; Future; Expected date: 09/12/2023  -     X-Ray Chest PA And Lateral; Future; Expected date: 09/11/2023    Postnasal drip  -     fluticasone propionate (FLONASE) 50 mcg/actuation nasal spray; 1 spray (50 mcg total) by Each Nostril route once daily.  Dispense: 9.9 mL; Refill: 3  -     levocetirizine (XYZAL) 5 MG tablet; Take 1 tablet (5 mg total) by mouth every evening.  Dispense: 30 tablet; Refill: 11    Fatigue, unspecified type  Advise to maintain lifestyle changes with low carbohydrate, low sugar diet and exercise 30 minutes daily    Abnormal weight gain  -     TSH; Future    Prediabetes  -     Hemoglobin A1C; Future; Expected date: " 09/11/2023    Iron deficiency anemia due to chronic blood loss  -not on iron supplement, will recheck    Thrombocytopenia  -     Iron and TIBC; Future; Expected date: 09/11/2023  -     Folate; Future; Expected date: 09/11/2023  -     Ferritin; Future; Expected date: 09/11/2023  -     Vitamin B12; Future; Expected date: 09/11/2023  -     CBC Auto Differential; Future; Expected date: 09/11/2023    Essential hypertension  -enrolled in digital htn program. White coat htn. Normal at home.    Multinodular thyroid  -recent ultrasound reviewed. Stable.     Vitamin D deficiency  -     Vitamin D; Future; Expected date: 09/11/2023    Mixed hyperlipidemia    BRANNON (obstructive sleep apnea)    Encounter for screening mammogram for malignant neoplasm of breast  -     Mammo Digital Screening Bilat w/ Kelvin; Future; Expected date: 09/11/2023    Cardiac arrhythmia, unspecified cardiac arrhythmia type  -     EKG 12-lead; Future    Shortness of breath  -     X-Ray Chest PA And Lateral; Future; Expected date: 09/11/2023    Constipation, unspecified constipation type  -miralax daily for 5 days and then stool softener if needed.   -miralax as needed  100 ounces of water daily.     -if arrhythmia noted will need to stop flexeril.         Follow up in about 6 months (around 3/11/2024), or if symptoms worsen or fail to improve.

## 2023-09-12 VITALS
WEIGHT: 223.13 LBS | HEIGHT: 65 IN | OXYGEN SATURATION: 98 % | RESPIRATION RATE: 20 BRPM | SYSTOLIC BLOOD PRESSURE: 128 MMHG | TEMPERATURE: 98 F | BODY MASS INDEX: 37.18 KG/M2 | HEART RATE: 84 BPM | DIASTOLIC BLOOD PRESSURE: 80 MMHG

## 2023-09-12 PROCEDURE — 25000003 PHARM REV CODE 250: Performed by: NURSE PRACTITIONER

## 2023-09-12 PROCEDURE — 96372 THER/PROPH/DIAG INJ SC/IM: CPT | Performed by: NURSE PRACTITIONER

## 2023-09-12 PROCEDURE — 63600175 PHARM REV CODE 636 W HCPCS: Performed by: NURSE PRACTITIONER

## 2023-09-12 RX ORDER — ONDANSETRON 4 MG/1
4 TABLET, ORALLY DISINTEGRATING ORAL
Status: COMPLETED | OUTPATIENT
Start: 2023-09-12 | End: 2023-09-12

## 2023-09-12 RX ORDER — MORPHINE SULFATE 4 MG/ML
4 INJECTION, SOLUTION INTRAMUSCULAR; INTRAVENOUS
Status: COMPLETED | OUTPATIENT
Start: 2023-09-12 | End: 2023-09-12

## 2023-09-12 RX ORDER — HYDROCODONE BITARTRATE AND ACETAMINOPHEN 10; 325 MG/1; MG/1
1 TABLET ORAL EVERY 6 HOURS PRN
Qty: 12 TABLET | Refills: 0 | Status: SHIPPED | OUTPATIENT
Start: 2023-09-12 | End: 2023-10-09

## 2023-09-12 RX ADMIN — MORPHINE SULFATE 4 MG: 4 INJECTION INTRAVENOUS at 01:09

## 2023-09-12 RX ADMIN — ONDANSETRON 4 MG: 4 TABLET, ORALLY DISINTEGRATING ORAL at 01:09

## 2023-09-12 NOTE — FIRST PROVIDER EVALUATION
"Medical screening examination initiated.  I have conducted a focused provider triage encounter, findings are as follows:    Brief history of present illness:  lower back pain radiating down chelle legs    Vitals:    09/11/23 2117   BP: 136/83   BP Location: Right arm   Patient Position: Sitting   Pulse: 87   Resp: 16   Temp: 98.1 °F (36.7 °C)   TempSrc: Oral   SpO2: 99%   Weight: 101.2 kg (223 lb 1.7 oz)   Height: 5' 5" (1.651 m)       Pertinent physical exam:  nad    Brief workup plan:  imaging, further eval    Preliminary workup initiated; this workup will be continued and followed by the physician or advanced practice provider that is assigned to the patient when roomed.  "

## 2023-09-12 NOTE — ED PROVIDER NOTES
Encounter Date: 9/11/2023       History     Chief Complaint   Patient presents with    Leg Pain     Bilat leg pain after fall 1 month ago     The history is provided by the patient. No  was used.   Back Pain   This is a recurrent problem. The current episode started several weeks ago. The problem occurs constantly. The problem has been unchanged. The pain is associated with falling. The pain is present in the lumbar spine. The quality of the pain is described as shooting and aching. The pain radiates to the right leg and left leg. The pain is at a severity of 4/10. The symptoms are aggravated by bending, twisting and certain positions. Pertinent negatives include no chest pain, no fever, no numbness, no weight loss, no headaches, no abdominal pain, no abdominal swelling, no bowel incontinence, no perianal numbness, no bladder incontinence, no dysuria, no pelvic pain, no paresthesias, no paresis, no tingling and no weakness. She has tried muscle relaxants, NSAIDs and analgesics for the symptoms. The treatment provided mild relief.     Review of patient's allergies indicates:  No Known Allergies  Past Medical History:   Diagnosis Date    Alcohol abuse     Alcoholic hepatitis     Anemia of chronic disease     Arthritis     generialized    Cancer 12/26/2017    liver    Coagulopathy     Dyspnea on exertion 3/26/2020    Encounter for blood transfusion     End stage liver disease     History of alcohol abuse     Hyperlipidemia     Hypersomnia 9/11/2020    Hypertension     Hyponatremia     Liver cirrhosis     Liver transplant candidate 12/26/2017    Obesity (BMI 30-39.9) 1/5/2016    Prediabetes 4/22/2023    Sleep apnea      Past Surgical History:   Procedure Laterality Date    BILATERAL SALPINGO-OOPHORECTOMY (BSO) Bilateral 1986    with hysterectomy done for abnormal cells    BREAST BIOPSY Left     benign    BREAST LUMPECTOMY      patient is unsure of date or laterality    CHOLECYSTECTOMY       COLONOSCOPY N/A 12/01/2017    Procedure: COLONOSCOPY;  Surgeon: Filemon Fuentes MD;  Location: [facility] ENDO (2ND FLR);  Service: Endoscopy;  Laterality: N/A;    COLONOSCOPY N/A 12/05/2017    Procedure: COLONOSCOPY;  Surgeon: José Chavira MD;  Location: [facility] ENDO (2ND FLR);  Service: Endoscopy;  Laterality: N/A;    COLONOSCOPY N/A 12/12/2022    Procedure: COLONOSCOPY;  Surgeon: Gogo Brown MD;  Location: [facility] ENDO;  Service: Endoscopy;  Laterality: N/A;    EPIDURAL STEROID INJECTION N/A 2/28/2023    Procedure: L4/5 IL WILBERTO (with right paramedian approach);  Surgeon: Ras Caballero MD;  Location: [facility] PAIN MGT;  Service: Pain Management;  Laterality: N/A;    EPIDURAL STEROID INJECTION INTO CERVICAL SPINE N/A 01/04/2022    Procedure: C5/6 IL WILBERTO;  Surgeon: Ras Caballero MD;  Location: [facility] PAIN MGT;  Service: Pain Management;  Laterality: N/A;    EXAMINATION UNDER ANESTHESIA N/A 02/18/2020    Procedure: EXAM UNDER ANESTHESIA;  Surgeon: Alden Horowitz MD;  Location: [facility] OR;  Service: General;  Laterality: N/A;    EXCISIONAL HEMORRHOIDECTOMY N/A 02/18/2020    Procedure: HEMORRHOIDECTOMY;  Surgeon: Alden Horowitz MD;  Location: [facility] OR;  Service: General;  Laterality: N/A;    HYSTERECTOMY  1986    due to abnormal paps - ovaries were removed    INJECTION OF ANESTHETIC AGENT AROUND PUDENDAL NERVE N/A 02/18/2020    Procedure: BLOCK, NERVE, PUDENDAL;  Surgeon: Alden Horowitz MD;  Location: [facility] OR;  Service: General;  Laterality: N/A;    INJECTION OF ANESTHETIC AGENT INTO SACROILIAC JOINT Right 06/14/2019    Procedure: right Sacroiliac Joint Injection;  Surgeon: David Desai MD;  Location: [facility] PAIN MGT;  Service: Pain Management;  Laterality: Right;    INJECTION OF ANESTHETIC AGENT INTO SACROILIAC JOINT Bilateral 02/07/2020    Procedure: Bilateral GT bursa + Bilateral Sacroiliac Joint Injection;  Surgeon: David Desai MD;  Location: [facility] PAIN MGT;  Service: Pain Management;  Laterality:  Bilateral;    INJECTION OF ANESTHETIC AGENT INTO SACROILIAC JOINT Bilateral 07/07/2020    Procedure: Bilateral GT bursa +SIJ injection;  Surgeon: David Desai MD;  Location: [facility] PAIN MGT;  Service: Pain Management;  Laterality: Bilateral;    INJECTION OF ANESTHETIC AGENT INTO SACROILIAC JOINT Right 10/21/2020    Procedure: Right piriformis + right SIJ + right GT bursa injection;  Surgeon: David Desai MD;  Location: [facility] PAIN MGT;  Service: Pain Management;  Laterality: Right;    INJECTION OF ANESTHETIC AGENT INTO SACROILIAC JOINT Bilateral 04/15/2021    Procedure: Bilateral SIJ + Bilateral Piriformis + Bilateral GT Bursa Injection;  Surgeon: Flaco Frazier MD;  Location: [facility] PAIN MGT;  Service: Pain Management;  Laterality: Bilateral;    INJECTION OF ANESTHETIC AGENT INTO SACROILIAC JOINT Bilateral 04/21/2022    Procedure: Bilateral SIJ + Bilateral Piriformis + Bilateral GT Bursa Injection;  Surgeon: Ras Caballero MD;  Location: [facility] PAIN MGT;  Service: Pain Management;  Laterality: Bilateral;    INJECTION OF ANESTHETIC AGENT INTO SACROILIAC JOINT Right 4/18/2023    Procedure: Right SIJ + Right piriformis +/- Right GT bursa injection;  Surgeon: Ras Caballero MD;  Location: [facility] PAIN MGT;  Service: Pain Management;  Laterality: Right;    INJECTION OF JOINT Bilateral 02/07/2020    Procedure: Bilateral GT bursa + Bilateral Sacroiliac Joint Injection;  Surgeon: David Desai MD;  Location: [facility] PAIN MGT;  Service: Pain Management;  Laterality: Bilateral;    INJECTION OF JOINT Bilateral 07/07/2020    Procedure: Bilateral GT bursa +SIJ injection;  Surgeon: David Desai MD;  Location: [facility] PAIN MGT;  Service: Pain Management;  Laterality: Bilateral;    INJECTION OF JOINT Right 10/21/2020    Procedure: Right piriformis + right SIJ + right GT bursa injection;  Surgeon: David Desai MD;  Location: [facility] PAIN MGT;  Service: Pain Management;  Laterality: Right;    INJECTION OF JOINT Bilateral  02/08/2021    Procedure: Bilateral GT Bursa Injection;  Surgeon: Anna Kaminski MD;  [facility];  Service: Pain Management;  Laterality: Bilateral;    INJECTION OF JOINT Bilateral 04/15/2021    Procedure: Bilateral SIJ + Bilateral Piriformis + Bilateral GT Bursa Injection;  Surgeon: Flaco Frazier MD;  Location: [facility] PAIN MGT;  Service: Pain Management;  Laterality: Bilateral;    INJECTION OF JOINT Bilateral 04/21/2022    Procedure: Bilateral SIJ + Bilateral Piriformis + Bilateral GT Bursa Injection;  Surgeon: Ras Caballero MD;  [facility];  Service: Pain Management;  Laterality: Bilateral;    INJECTION OF JOINT Right 10/11/2022    Procedure: right SIJ + right piriformis + right GT bursa injection;  Surgeon: Ras Caballero MD;  Location: [facility] PAIN MGT;  Service: Pain Management;  Laterality: Right;    INJECTION OF PIRIFORMIS MUSCLE Right 06/14/2019    Procedure: Right piriformis injection;  Surgeon: David Desai MD;  [facility];  Service: Pain Management;  Laterality: Right;    INJECTION OF PIRIFORMIS MUSCLE Right 10/21/2020    Procedure: Right piriformis + right SIJ + right GT bursa injection;  Surgeon: David Desai MD;  [facility];  Service: Pain Management;  Laterality: Right;    INJECTION OF PIRIFORMIS MUSCLE Bilateral 04/15/2021    Procedure: Bilateral SIJ + Bilateral Piriformis + Bilateral GT Bursa Injection;  Surgeon: Flaco Frazier MD;  [facility];  Service: Pain Management;  Laterality: Bilateral;    INJECTION OF PIRIFORMIS MUSCLE Bilateral 04/21/2022    Procedure: Bilateral SIJ + Bilateral Piriformis + Bilateral GT Bursa Injection;  Surgeon: Ras Caballero MD;  [facility];  Service: Pain Management;  Laterality: Bilateral;    LIVER TRANSPLANT  12/2017    SELECTIVE INJECTION OF ANESTHETIC AGENT AROUND LUMBAR SPINAL NERVE ROOT BY TRANSFORAMINAL APPROACH Bilateral 10/28/2021    Procedure: BLOCK, SPINAL NERVE ROOT,  LUMBAR, SELECTIVE, TRANSFORAMINAL APPROACH bilateral L4-5 and Right Knee injection with RN IV sedation;  Surgeon: Flaco Frazier MD;  Location: [facility] PAIN MGT;  Service: Pain Management;  Laterality: Bilateral;    SELECTIVE INJECTION OF ANESTHETIC AGENT AROUND LUMBAR SPINAL NERVE ROOT BY TRANSFORAMINAL APPROACH Bilateral 08/18/2022    Procedure: Bilateral L4/5 TF WILBERTO;  Surgeon: Ras Caballero MD;  Location: [facility] PAIN MGT;  Service: Pain Management;  Laterality: Bilateral;    TRANSFORAMINAL EPIDURAL INJECTION OF STEROID Right 8/15/2023    Procedure: right L5/S1 + S1 TF WILBERTO;  Surgeon: Ras Caballero MD;  Location: [facility] PAIN MGT;  Service: Pain Management;  Laterality: Right;     Family History   Problem Relation Age of Onset    Hypertension Mother     Alzheimer's disease Mother     Hypertension Father     Diabetes Father     Diabetes Sister     Ovarian cancer Paternal Aunt     Depression Maternal Grandfather      Social History     Tobacco Use    Smoking status: Never    Smokeless tobacco: Never   Substance Use Topics    Alcohol use: No     Comment: Currently admitted to inpatient rehab 7/2017    Drug use: No     Review of Systems   Constitutional:  Negative for fever and weight loss.   HENT:  Negative for sore throat.    Respiratory:  Negative for shortness of breath.    Cardiovascular:  Negative for chest pain.   Gastrointestinal:  Negative for abdominal pain, bowel incontinence, nausea and vomiting.   Genitourinary:  Negative for bladder incontinence, dysuria and pelvic pain.   Musculoskeletal:  Positive for back pain.   Skin:  Negative for rash.   Neurological:  Negative for tingling, weakness, numbness, headaches and paresthesias.        No saddle anesthesia   No incontinence   Hematological:  Does not bruise/bleed easily.       Physical Exam     Initial Vitals [09/11/23 2117]   BP Pulse Resp Temp SpO2   136/83 87 16 98.1 °F (36.7 °C) 99 %      MAP       --         Physical Exam    Nursing note and vitals  reviewed.  Constitutional: She appears well-developed and well-nourished. She is not diaphoretic. No distress.   HENT:   Head: Normocephalic and atraumatic.   Eyes: Right eye exhibits no discharge. Left eye exhibits no discharge.   Neck: Neck supple.   Normal range of motion.  Cardiovascular:  Normal rate.           Pulmonary/Chest: No respiratory distress.   Abdominal: She exhibits no distension.   Musculoskeletal:         General: Normal range of motion.      Cervical back: Normal range of motion and neck supple.      Comments: DP and PT pulses 2+ bilaterally.     Neurological: She is alert and oriented to person, place, and time. She has normal strength.   Skin: Skin is warm and dry.   Psychiatric: She has a normal mood and affect. Her behavior is normal. Thought content normal.         ED Course   Procedures  Labs Reviewed - No data to display       Imaging Results              X-Ray Lumbar Spine Ap And Lateral (Final result)  Result time 09/11/23 22:14:32      Final result by Dougie Tabor MD (09/11/23 22:14:32)                   Impression:      No acute abnormality.      Electronically signed by: Dougie Tabor  Date:    09/11/2023  Time:    22:14               Narrative:    EXAMINATION:  XR LUMBAR SPINE AP AND LATERAL    CLINICAL HISTORY:  XR LUMBAR SPINE AP AND LATERAL    COMPARISON:  None    FINDINGS:  Multiple radiographic views  were obtained.    No evidence of acute fracture or dislocation.  Moderate amount of stool in colon.  Mild atherosclerotic changes.  Mild degenerative joint disease.  Hyperdense nodules in the left upper and right lower quadrant likely related to ingested material.                                       Medications   HYDROcodone-acetaminophen 5-325 mg per tablet 1 tablet (1 tablet Oral Given 9/11/23 8402)   morphine injection 4 mg (4 mg Intramuscular Given 9/12/23 0118)   ondansetron disintegrating tablet 4 mg (4 mg Oral Given 9/12/23 0118)     Medical Decision Making  Fracture,  dislocation, sciatica    Amount and/or Complexity of Data Reviewed  Radiology: ordered.  Discussion of management or test interpretation with external provider(s): Patient noted to be sleeping on reassessment of pain.  Patient is to follow-up with her PCP of choice as needed and return to the ER for any worsening or concerns.    Risk  Prescription drug management.         Medications   HYDROcodone-acetaminophen 5-325 mg per tablet 1 tablet (1 tablet Oral Given 9/11/23 0785)   morphine injection 4 mg (4 mg Intramuscular Given 9/12/23 0118)   ondansetron disintegrating tablet 4 mg (4 mg Oral Given 9/12/23 0118)                             Clinical Impression:   Final diagnoses:  [M54.42, M54.41] Low back pain with bilateral sciatica, unspecified back pain laterality, unspecified chronicity (Primary)        ED Disposition Condition    Discharge Stable          ED Prescriptions       Medication Sig Dispense Start Date End Date Auth. Provider    HYDROcodone-acetaminophen (NORCO)  mg per tablet Take 1 tablet by mouth every 6 (six) hours as needed for Pain. 12 tablet 9/12/2023 -- Ras Mckeon NP          Follow-up Information       Follow up With Specialties Details Why Contact Info    pcp of choice   As needed     [facility] - Emergency Dept. Emergency Medicine  If symptoms worsen [id number] [address] [facility] [id number] [phone]             Ras Mckeon NP  09/12/23 2172

## 2023-09-13 ENCOUNTER — HOSPITAL ENCOUNTER (OUTPATIENT)
Dept: PREADMISSION TESTING | Facility: HOSPITAL | Age: 58
Discharge: HOME | End: 2023-09-13
Attending: INTERNAL MEDICINE
Payer: MEDICAID

## 2023-09-13 DIAGNOSIS — R13.10 DYSPHAGIA, UNSPECIFIED TYPE: Primary | ICD-10-CM

## 2023-09-14 ENCOUNTER — ANESTHESIA (OUTPATIENT)
Dept: ENDOSCOPY | Facility: HOSPITAL | Age: 58
End: 2023-09-14
Payer: MEDICAID

## 2023-09-14 ENCOUNTER — ANESTHESIA EVENT (OUTPATIENT)
Dept: ENDOSCOPY | Facility: HOSPITAL | Age: 58
End: 2023-09-14
Payer: MEDICAID

## 2023-09-14 ENCOUNTER — HOSPITAL ENCOUNTER (OUTPATIENT)
Facility: HOSPITAL | Age: 58
Discharge: HOME | End: 2023-09-14
Attending: INTERNAL MEDICINE | Admitting: INTERNAL MEDICINE
Payer: MEDICAID

## 2023-09-14 DIAGNOSIS — R13.10 DYSPHAGIA, UNSPECIFIED TYPE: Primary | ICD-10-CM

## 2023-09-14 DIAGNOSIS — R13.10 DYSPHAGIA: ICD-10-CM

## 2023-09-14 PROCEDURE — 63600175 PHARM REV CODE 636 W HCPCS: Performed by: NURSE ANESTHETIST, CERTIFIED REGISTERED

## 2023-09-14 PROCEDURE — 43235 EGD DIAGNOSTIC BRUSH WASH: CPT | Performed by: INTERNAL MEDICINE

## 2023-09-14 PROCEDURE — 37000008 HC ANESTHESIA 1ST 15 MINUTES: Performed by: INTERNAL MEDICINE

## 2023-09-14 PROCEDURE — 43235 PR EGD, FLEX, DIAGNOSTIC: ICD-10-PCS | Mod: ,,, | Performed by: INTERNAL MEDICINE

## 2023-09-14 PROCEDURE — 25000003 PHARM REV CODE 250: Performed by: NURSE ANESTHETIST, CERTIFIED REGISTERED

## 2023-09-14 PROCEDURE — 43235 EGD DIAGNOSTIC BRUSH WASH: CPT | Mod: ,,, | Performed by: INTERNAL MEDICINE

## 2023-09-14 RX ORDER — PROPOFOL 10 MG/ML
VIAL (ML) INTRAVENOUS
Status: DISCONTINUED | OUTPATIENT
Start: 2023-09-14 | End: 2023-09-14

## 2023-09-14 RX ORDER — LIDOCAINE HYDROCHLORIDE 10 MG/ML
INJECTION, SOLUTION EPIDURAL; INFILTRATION; INTRACAUDAL; PERINEURAL
Status: DISCONTINUED | OUTPATIENT
Start: 2023-09-14 | End: 2023-09-14

## 2023-09-14 RX ADMIN — SODIUM CHLORIDE, SODIUM LACTATE, POTASSIUM CHLORIDE, AND CALCIUM CHLORIDE: .6; .31; .03; .02 INJECTION, SOLUTION INTRAVENOUS at 11:09

## 2023-09-14 RX ADMIN — LIDOCAINE HYDROCHLORIDE 50 MG: 10 SOLUTION INTRAVENOUS at 11:09

## 2023-09-14 RX ADMIN — PROPOFOL 30 MG: 10 INJECTION, EMULSION INTRAVENOUS at 11:09

## 2023-09-14 RX ADMIN — PROPOFOL 80 MG: 10 INJECTION, EMULSION INTRAVENOUS at 11:09

## 2023-09-14 NOTE — TRANSFER OF CARE
"Anesthesia Transfer of Care Note    Patient: Marcie Bazan    Procedure(s) Performed: Procedure(s) (LRB):  EGD (ESOPHAGOGASTRODUODENOSCOPY) (N/A)    Patient location: PACU    Anesthesia Type: MAC    Transport from OR: Transported from OR on room air with adequate spontaneous ventilation    Post pain: adequate analgesia    Post assessment: no apparent anesthetic complications    Post vital signs: stable    Level of consciousness: responds to stimulation    Nausea/Vomiting: no nausea/vomiting    Complications: none    Transfer of care protocol was followed      Last vitals:   Visit Vitals  BP (!) 156/91   Pulse 80   Temp 36.5 °C (97.7 °F)   Resp 18   Ht 5' 5" (1.651 m)   Wt 100.7 kg (222 lb)   LMP 01/01/1985 (Approximate)   SpO2 98%   Breastfeeding No   BMI 36.94 kg/m²     "

## 2023-09-14 NOTE — PROVATION PATIENT INSTRUCTIONS
Discharge Summary/Instructions after an Endoscopic Procedure  Patient Name: Marcie Bazan  Patient MRN: [id number]  Patient YOB: 1965  Thursday, September 14, 2023 Angie Zimmerman MD  Dear patient,  As a result of recent federal legislation (The Federal Cures Act), you may   receive lab or pathology results from your procedure in your [facility]   account before your physician is able to contact you. Your physician or   their representative will relay the results to you with their   recommendations at their soonest availability.  Thank you,  RESTRICTIONS:  During your procedure today, you received medications for sedation.  These   medications may affect your judgment, balance and coordination.  Therefore,   for 24 hours, you have the following restrictions:   - DO NOT drive a car, operate machinery, make legal/financial decisions,   sign important papers or drink alcohol.    ACTIVITY:  Today: no heavy lifting, straining or running due to procedural   sedation/anesthesia.  The following day: return to full activity including work.  DIET:  Eat and drink normally unless instructed otherwise.     TREATMENT FOR COMMON SIDE EFFECTS:  - Mild abdominal pain, nausea, belching, bloating or excessive gas:  rest,   eat lightly and use a heating pad.  - Sore Throat: treat with throat lozenges and/or gargle with warm salt   water.  - Because air was used during the procedure, expelling large amounts of air   from your rectum or belching is normal.  - If a bowel prep was taken, you may not have a bowel movement for 1-3 days.    This is normal.  SYMPTOMS TO WATCH FOR AND REPORT TO YOUR PHYSICIAN:  1. Abdominal pain or bloating, other than gas cramps.  2. Chest pain.  3. Back pain.  4. Signs of infection such as: chills or fever occurring within 24 hours   after the procedure.  5. Rectal bleeding, which would show as bright red, maroon, or black stools.   (A tablespoon of blood from the rectum is not serious, especially if    hemorrhoids are present.)  6. Vomiting.  7. Weakness or dizziness.  GO DIRECTLY TO THE NEAREST EMERGENCY ROOM IF YOU HAVE ANY OF THE FOLLOWING:      Difficulty breathing              Chills and/or fever over 101 F   Persistent vomiting and/or vomiting blood   Severe abdominal pain   Severe chest pain   Black, tarry stools   Bleeding- more than one tablespoon   Any other symptom or condition that you feel may need urgent attention  Your doctor recommends these additional instructions:  If any biopsies were taken, your doctors clinic will contact you in 1 to 2   weeks with any results.  - Patient has a contact number available for emergencies.  The signs and   symptoms of potential delayed complications were discussed with the   patient.  Return to normal activities tomorrow.  Written discharge   instructions were provided to the patient.   - Discharge patient to home (via wheelchair).   - Resume previous diet today.   - Continue present medications.   - Return to referring physician assistant.  For questions, problems or results please call your physician Angie Zimmerman MD at Work:  [phone]  If you have any questions about the above instructions, call the GI   department at [phone] or call the endoscopy unit at [phone]   from 7am until 3 pm.  [facility] [place], EMERGENCY ROOM PHONE NUMBER:   [phone]  IF A COMPLICATION OR EMERGENCY SITUATION ARISES AND YOU ARE UNABLE TO REACH   YOUR PHYSICIAN - GO DIRECTLY TO THE EMERGENCY ROOM.  I have read or have had read to me these discharge instructions for my   procedure and have received a written copy.  I understand these   instructions and will follow-up with my physician if I have any questions.     __________________________________       _____________________________________  Nurse Signature                                          Patient/Designated   Responsible Party Signature  MD Angie Gamez,  MD  9/14/2023 11:15:26 AM  This report has been verified and signed electronically.  Dear patient,  As a result of recent federal legislation (The Federal Cures Act), you may   receive lab or pathology results from your procedure in your [facility]   account before your physician is able to contact you. Your physician or   their representative will relay the results to you with their   recommendations at their soonest availability.  Thank you,  PROVATION

## 2023-09-14 NOTE — H&P
PRE PROCEDURE H&P    Patient Name: Marcie Bazan  MRN: [id number]  : 1965  Date of Procedure:  2023  Referring Physician: Jenny Suggs*  Primary Physician: ETHAN Munoz MD  Procedure Physician: Angie Zimmerman MD       Planned Procedure: EGD  Diagnosis: dysphagia   Chief Complaint: Same as above    HPI: Patient is an 57 y.o. female is here for the above.         Past Medical History:   Past Medical History:   Diagnosis Date    Alcohol abuse     Alcoholic hepatitis     Anemia of chronic disease     Arthritis     generialized    Cancer 2017    liver    Coagulopathy     Dyspnea on exertion 3/26/2020    Encounter for blood transfusion     End stage liver disease     History of alcohol abuse     Hyperlipidemia     Hypersomnia 2020    Hypertension     Hyponatremia     Liver cirrhosis     Liver transplant candidate 2017    Obesity (BMI 30-39.9) 2016    Prediabetes 2023    Sleep apnea         Past Surgical History:  Past Surgical History:   Procedure Laterality Date    BILATERAL SALPINGO-OOPHORECTOMY (BSO) Bilateral     with hysterectomy done for abnormal cells    BREAST BIOPSY Left     benign    BREAST LUMPECTOMY      patient is unsure of date or laterality    CHOLECYSTECTOMY      COLONOSCOPY N/A 2017    Procedure: COLONOSCOPY;  Surgeon: Filemon Fuentes MD;  Location: [facility] ([address] [place] [address]);  Service: Endoscopy;  Laterality: N/A;    COLONOSCOPY N/A 2017    Procedure: COLONOSCOPY;  Surgeon: José Chavira MD;  Location[facility] ENDO [facility]  Service: Endoscopy;  Laterality: N/A;    COLONOSCOPY N/A 2022    Procedure: COLONOSCOPY;  Surgeon: Gogo Brown MD;  Location: [facility] ENDO;  Service: Endoscopy;  Laterality: N/A;    EPIDURAL STEROID INJECTION N/A 2023    Procedure: L4/5 IL WILBERTO (with right paramedian approach);  Surgeon: Ras Caballero MD;  Location: [facility] PAIN MGT;  Service: Pain Management;  Laterality: N/A;    EPIDURAL STEROID  INJECTION INTO CERVICAL SPINE N/A 01/04/2022    Procedure: C5/6 IL WILBERTO;  Surgeon: Ras Caballero MD;  Location: [facility] PAIN MGT;  Service: Pain Management;  Laterality: N/A;    EXAMINATION UNDER ANESTHESIA N/A 02/18/2020    Procedure: EXAM UNDER ANESTHESIA;  Surgeon: Alden Horowitz MD;  Location: [facility] OR;  Service: General;  Laterality: N/A;    EXCISIONAL HEMORRHOIDECTOMY N/A 02/18/2020    Procedure: HEMORRHOIDECTOMY;  Surgeon: Alden Horowitz MD;  Location: [facility] OR;  Service: General;  Laterality: N/A;    HYSTERECTOMY  1986    due to abnormal paps - ovaries were removed    INJECTION OF ANESTHETIC AGENT AROUND PUDENDAL NERVE N/A 02/18/2020    Procedure: BLOCK, NERVE, PUDENDAL;  Surgeon: Alden Horowitz MD;  Location: [facility] OR;  Service: General;  Laterality: N/A;    INJECTION OF ANESTHETIC AGENT INTO SACROILIAC JOINT Right 06/14/2019    Procedure: right Sacroiliac Joint Injection;  Surgeon: David Desai MD;  Location: [facility] PAIN MGT;  Service: Pain Management;  Laterality: Right;    INJECTION OF ANESTHETIC AGENT INTO SACROILIAC JOINT Bilateral 02/07/2020    Procedure: Bilateral GT bursa + Bilateral Sacroiliac Joint Injection;  Surgeon: David Desai MD;  Location: [facility] PAIN MGT;  Service: Pain Management;  Laterality: Bilateral;    INJECTION OF ANESTHETIC AGENT INTO SACROILIAC JOINT Bilateral 07/07/2020    Procedure: Bilateral GT bursa +SIJ injection;  Surgeon: David Desai MD;  Location: [facility] PAIN MGT;  Service: Pain Management;  Laterality: Bilateral;    INJECTION OF ANESTHETIC AGENT INTO SACROILIAC JOINT Right 10/21/2020    Procedure: Right piriformis + right SIJ + right GT bursa injection;  Surgeon: David Desai MD;  Location: [facility] PAIN MGT;  Service: Pain Management;  Laterality: Right;    INJECTION OF ANESTHETIC AGENT INTO SACROILIAC JOINT Bilateral 04/15/2021    Procedure: Bilateral SIJ + Bilateral Piriformis + Bilateral GT Bursa Injection;  Surgeon: Flaco Frazier MD;  Location: [facility]  PAIN MGT;  Service: Pain Management;  Laterality: Bilateral;    INJECTION OF ANESTHETIC AGENT INTO SACROILIAC JOINT Bilateral 04/21/2022    Procedure: Bilateral SIJ + Bilateral Piriformis + Bilateral GT Bursa Injection;  Surgeon: Ras Caballero MD;  Location: [facility] PAIN MGT;  Service: Pain Management;  Laterality: Bilateral;    INJECTION OF ANESTHETIC AGENT INTO SACROILIAC JOINT Right 4/18/2023    Procedure: Right SIJ + Right piriformis +/- Right GT bursa injection;  Surgeon: Ras Caballero MD;  Location: [facility] PAIN MGT;  Service: Pain Management;  Laterality: Right;    INJECTION OF JOINT Bilateral 02/07/2020    Procedure: Bilateral GT bursa + Bilateral Sacroiliac Joint Injection;  Surgeon: David Desai MD;  Location: [facility] PAIN MGT;  Service: Pain Management;  Laterality: Bilateral;    INJECTION OF JOINT Bilateral 07/07/2020    Procedure: Bilateral GT bursa +SIJ injection;  Surgeon: David Desai MD;  Location: [facility] PAIN MGT;  Service: Pain Management;  Laterality: Bilateral;    INJECTION OF JOINT Right 10/21/2020    Procedure: Right piriformis + right SIJ + right GT bursa injection;  Surgeon: David Desai MD;  Location: [facility] PAIN MGT;  Service: Pain Management;  Laterality: Right;    INJECTION OF JOINT Bilateral 02/08/2021    Procedure: Bilateral GT Bursa Injection;  Surgeon: Anna Kaminski MD;  Location: [facility] PAIN MGT;  Service: Pain Management;  Laterality: Bilateral;    INJECTION OF JOINT Bilateral 04/15/2021    Procedure: Bilateral SIJ + Bilateral Piriformis + Bilateral GT Bursa Injection;  Surgeon: Flaco Frazier MD;  Location: [facility] PAIN MGT;  Service: Pain Management;  Laterality: Bilateral;    INJECTION OF JOINT Bilateral 04/21/2022    Procedure: Bilateral SIJ + Bilateral Piriformis + Bilateral GT Bursa Injection;  Surgeon: Ras Caballero MD;  Location: [facility] PAIN MGT;  Service: Pain Management;  Laterality: Bilateral;    INJECTION OF JOINT Right 10/11/2022    Procedure: right SIJ + right  piriformis + right GT bursa injection;  Surgeon: Ras Caballero MD;  Location: [facility] PAIN MGT;  Service: Pain Management;  Laterality: Right;    INJECTION OF PIRIFORMIS MUSCLE Right 06/14/2019    Procedure: Right piriformis injection;  Surgeon: David Desai MD;  Location: [facility] PAIN MGT;  Service: Pain Management;  Laterality: Right;    INJECTION OF PIRIFORMIS MUSCLE Right 10/21/2020    Procedure: Right piriformis + right SIJ + right GT bursa injection;  Surgeon: David Desai MD;  Location: [facility] PAIN MGT;  Service: Pain Management;  Laterality: Right;    INJECTION OF PIRIFORMIS MUSCLE Bilateral 04/15/2021    Procedure: Bilateral SIJ + Bilateral Piriformis + Bilateral GT Bursa Injection;  Surgeon: Flaco Frazier MD;  Location: [facility] PAIN MGT;  Service: Pain Management;  Laterality: Bilateral;    INJECTION OF PIRIFORMIS MUSCLE Bilateral 04/21/2022    Procedure: Bilateral SIJ + Bilateral Piriformis + Bilateral GT Bursa Injection;  Surgeon: Ras Caballero MD;  Location: [facility] PAIN MGT;  Service: Pain Management;  Laterality: Bilateral;    LIVER TRANSPLANT  12/2017    SELECTIVE INJECTION OF ANESTHETIC AGENT AROUND LUMBAR SPINAL NERVE ROOT BY TRANSFORAMINAL APPROACH Bilateral 10/28/2021    Procedure: BLOCK, SPINAL NERVE ROOT, LUMBAR, SELECTIVE, TRANSFORAMINAL APPROACH bilateral L4-5 and Right Knee injection with RN IV sedation;  Surgeon: Flaco Frazier MD;  Location: [facility] PAIN MGT;  Service: Pain Management;  Laterality: Bilateral;    SELECTIVE INJECTION OF ANESTHETIC AGENT AROUND LUMBAR SPINAL NERVE ROOT BY TRANSFORAMINAL APPROACH Bilateral 08/18/2022    Procedure: Bilateral L4/5 TF WILBERTO;  Surgeon: Ras Caballero MD;  Location: [facility] PAIN MGT;  Service: Pain Management;  Laterality: Bilateral;    TRANSFORAMINAL EPIDURAL INJECTION OF STEROID Right 8/15/2023    Procedure: right L5/S1 + S1 TF WILBERTO;  Surgeon: Ras Caballero MD;  Location: [facility] PAIN MGT;  Service: Pain Management;  Laterality: Right;        Home  Medications:  Prior to Admission medications    Medication Sig Start Date End Date Taking? Authorizing Provider   amitriptyline (ELAVIL) 25 MG tablet TAKE 1 TO 2 TABLETS(25 TO 50 MG) BY MOUTH EVERY NIGHT AS NEEDED FOR INSOMNIA OR PAIN 8/24/23  Yes Emilee Buitrago PA-C   atorvastatin (LIPITOR) 40 MG tablet Take 1 tablet (40 mg total) by mouth every evening. 3/28/23 3/27/24 Yes ETHAN Munoz MD   cyclobenzaprine (FLEXERIL) 10 MG tablet TAKE 1/2 TO 1 TABLET BY MOUTH THREE TIMES DAILY AS NEEDED FOR MUSCLE SPASMS 8/9/23  Yes Emilee Buitrago PA-C   DULoxetine (CYMBALTA) 20 MG capsule TAKE 1 CAPSULE(20 MG) BY MOUTH EVERY DAY 9/5/23  Yes Ras Caballero MD   famotidine (PEPCID) 20 MG tablet Take 1 tablet (20 mg total) by mouth 2 (two) times a day. 3/28/23 12/21/25 Yes ETHAN Munoz MD   furosemide (LASIX) 40 MG tablet TAKE 1 TABLET BY MOUTH EVERY DAY 12/22/22  Yes Mary Fernandez MD   HYDROcodone-acetaminophen (NORCO)  mg per tablet Take 1 tablet by mouth every 6 (six) hours as needed for Pain. 9/12/23  Yes Ras Mckeon NP   levocetirizine (XYZAL) 5 MG tablet Take 1 tablet (5 mg total) by mouth every evening. 9/11/23 9/10/24 Yes Jenny Suggs PA-C   losartan (COZAAR) 25 MG tablet Take 1 tablet (25 mg total) by mouth once daily. 3/28/23 3/27/24 Yes ETHAN Munoz MD   magnesium oxide (MAG-OX) 400 mg (241.3 mg magnesium) tablet TAKE 1 TABLET(400 MG) BY MOUTH TWICE DAILY 3/9/23  Yes Joselin Wallace MD   natrexone tablet 4.5 mg Take 1 tablet (4.5 mg total) by mouth every evening. 11/9/22  Yes Emilee Buitrago PA-C   NIFEdipine (PROCARDIA-XL) 60 MG (OSM) 24 hr tablet TAKE 1 TABLET(60 MG) BY MOUTH EVERY EVENING 6/23/23  Yes ETHAN Munoz MD   ondansetron (ZOFRAN) 8 MG tablet Take 1 tablet (8 mg total) by mouth every 12 (twelve) hours as needed for Nausea. 3/28/23  Yes ETHAN Munoz MD   ondansetron (ZOFRAN-ODT) 4 MG TbDL ondansetron Take 1 Tablet (oral) 3 times  per day PRN - Nausea [id number] tablet,disintegrating 3 times per day oral No set duration recorded No set duration amount recorded active 4 mg 6/30/22  Yes Provider, Historical   pantoprazole (PROTONIX) 40 MG tablet Take 1 tablet (40 mg total) by mouth every morning. 3/28/23 3/27/24 Yes ETHAN Munoz MD   potassium chloride (KLOR-CON) 10 MEQ TbSR Take 1 tablet (10 mEq total) by mouth 2 (two) times daily. 3/28/23  Yes ETHAN Munoz MD   pregabalin (LYRICA) 225 MG Cap TAKE 1 CAPSULE(225 MG) BY MOUTH TWICE DAILY Strength: 225 mg 7/19/23  Yes Ras Caballero MD   tacrolimus (PROGRAF) 1 MG Cap TAKE 2 CAPSULES BY MOUTH EVERY MORNING AND 1 CAPSULE EVERY EVENING 9/5/23  Yes Joselin Wallace MD   traMADoL (ULTRAM) 50 mg tablet TAKE 1/2 TO 1 TABLET(25 TO 50 MG) BY MOUTH EVERY 8 HOURS AS NEEDED FOR PAIN Strength: 50 mg 9/6/23  Yes Ras Caballero MD   fluticasone propionate (FLONASE) 50 mcg/actuation nasal spray 1 spray (50 mcg total) by Each Nostril route once daily. 9/11/23 10/11/23  Jenny Suggs PA-C   ketorolac 0.5% (ACULAR) 0.5 % Drop Place 1 drop into the right eye 3 (three) times daily. 7/28/23   Kim Mullen MD   ketorolac 0.5% (ACULAR) 0.5 % Drop Place 1 drop into the left eye 3 (three) times daily. 8/23/23   Kim Mullen MD   ofloxacin (OCUFLOX) 0.3 % ophthalmic solution Place 1 drop into the right eye 3 (three) times daily. 7/28/23   Kim Mullen MD   ofloxacin (OCUFLOX) 0.3 % ophthalmic solution Place 1 drop into the left eye 3 (three) times daily. 8/23/23   Kim Mullen MD   prednisolon/gatiflox/bromfenac (PREDNISOL ACE-GATIFLOX-BROMFEN) 1-0.5-0.075 % DrpS Apply 1 drop to eye 3 (three) times daily. 8/1/23   Kim Mullen MD   prednisoLONE acetate (PRED FORTE) 1 % DrpS Place 1 drop into the right eye 3 (three) times daily. 7/28/23   Kim Mullen MD   prednisoLONE acetate (PRED FORTE) 1 % DrpS Place 1 drop into the left eye 3 (three) times daily. 8/23/23    "Kim Mullen MD        Allergies:  Review of patient's allergies indicates:  No Known Allergies     Social History:   Social History     Socioeconomic History    Marital status: Single    Number of children: 2   Tobacco Use    Smoking status: Never    Smokeless tobacco: Never   Substance and Sexual Activity    Alcohol use: No     Comment: Currently admitted to inpatient rehab 7/2017    Drug use: No    Sexual activity: Not Currently       Family History:  Family History   Problem Relation Age of Onset    Hypertension Mother     Alzheimer's disease Mother     Hypertension Father     Diabetes Father     Diabetes Sister     Ovarian cancer Paternal Aunt     Depression Maternal Grandfather        ROS: No acute cardiac events, no acute respiratory complaints.     Physical Exam (all patients):    BP (!) 156/91   Pulse 80   Temp 97.7 °F (36.5 °C)   Resp 18   Ht 5' 5" (1.651 m)   Wt 100.7 kg (222 lb)   LMP 01/01/1985 (Approximate) Comment: 1985  SpO2 98%   Breastfeeding No   BMI 36.94 kg/m²   Lungs: Clear to auscultation bilaterally, respirations unlabored  Heart: Regular rate and rhythm, S1 and S2 normal, no obvious murmurs  Abdomen:         Soft, non-tender, bowel sounds normal, no masses, no organomegaly    Lab Results   Component Value Date    WBC 9.19 05/30/2023    MCV 79 (L) 05/30/2023    RDW 15.9 (H) 05/30/2023     05/30/2023    INR 1.0 02/27/2020     05/30/2023    HGBA1C 5.9 (H) 04/17/2023    BUN 15 05/30/2023     05/30/2023    K 3.7 05/30/2023     05/30/2023        SEDATION PLAN: per anesthesia      History reviewed, vital signs satisfactory, cardiopulmonary status satisfactory, sedation options, risks and plans have been discussed with the patient  All their questions were answered and the patient agrees to the sedation procedures as planned and the patient is deemed an appropriate candidate for the sedation as planned.    Procedure explained to patient, informed consent " obtained and placed in chart.    Angie Zimmerman  9/14/2023  10:56 AM

## 2023-09-14 NOTE — ANESTHESIA PREPROCEDURE EVALUATION
09/14/2023  Marcie Bazan is a 57 y.o., female.      Pre-op Assessment    I have reviewed the Patient Summary Reports.     I have reviewed the Nursing Notes. I have reviewed the NPO Status.   I have reviewed the Medications.     Review of Systems  Anesthesia Hx:  No problems with previous Anesthesia  Denies Family Hx of Anesthesia complications.   Denies Personal Hx of Anesthesia complications.   Social:  Non-Smoker    Hematology/Oncology:         -- Anemia: Current/Recent Cancer.   EENT/Dental:EENT/Dental Normal   Cardiovascular:   Hypertension hyperlipidemia ECG has been reviewed.    Pulmonary:   Shortness of breath Sleep Apnea    Renal/:  Renal/ Normal     Hepatic/GI:   PUD, Liver Disease, Hepatitis Liver transplant 6 yrs ago   Musculoskeletal:   Arthritis     Neurological:   Neuromuscular Disease,    Endocrine:  Obesity / BMI > 30  Dermatological:  Skin Normal    Psych:   Psychiatric History          Physical Exam  General: Cooperative, Alert and Oriented    Airway:  Mallampati: II   Mouth Opening: Normal  TM Distance: Normal  Tongue: Normal  Neck ROM: Normal ROM    Dental:  Intact    Chest/Lungs:  Clear to auscultation, Normal Respiratory Rate    Heart:  Rate: Normal  Rhythm: Regular Rhythm        Anesthesia Plan  Type of Anesthesia, risks & benefits discussed:    Anesthesia Type: MAC  Intra-op Monitoring Plan: Standard ASA Monitors  Induction:  IV  Informed Consent: Informed consent signed with the Patient and all parties understand the risks and agree with anesthesia plan.  All questions answered.   ASA Score: 4  Day of Surgery Review of History & Physical: H&P Update referred to the surgeon/provider.I have interviewed and examined the patient. I have reviewed the patient's H&P dated: There are no significant changes.     Ready For Surgery From Anesthesia Perspective.     .

## 2023-09-14 NOTE — PLAN OF CARE
Dr. Zimmerman at  to discuss findings. VSS. No  Pain, no GI bleeding. Pt to be discharged from unit.

## 2023-09-14 NOTE — ANESTHESIA POSTPROCEDURE EVALUATION
Anesthesia Post Evaluation    Patient: Marcie Bazan    Procedure(s) Performed: Procedure(s) (LRB):  EGD (ESOPHAGOGASTRODUODENOSCOPY) (N/A)    Final Anesthesia Type: MAC      Patient location during evaluation: PACU  Patient participation: Yes- Able to Participate  Level of consciousness: awake and alert  Post-procedure vital signs: reviewed and stable  Pain management: adequate  Airway patency: patent    PONV status at discharge: No PONV  Anesthetic complications: no      Cardiovascular status: blood pressure returned to baseline  Respiratory status: unassisted and room air  Hydration status: euvolemic  Follow-up not needed.          Vitals Value Taken Time   /84 09/14/23 1137   Temp 36.6 °C (97.9 °F) 09/14/23 1117   Pulse 74 09/14/23 1137   Resp 18 09/14/23 1137   SpO2 98 % 09/14/23 1137         No case tracking events are documented in the log.      Pain/Larry Score: Larry Score: 9 (9/14/2023 11:17 AM)

## 2023-09-15 VITALS
DIASTOLIC BLOOD PRESSURE: 84 MMHG | WEIGHT: 222 LBS | SYSTOLIC BLOOD PRESSURE: 145 MMHG | OXYGEN SATURATION: 98 % | HEIGHT: 65 IN | TEMPERATURE: 98 F | HEART RATE: 74 BPM | BODY MASS INDEX: 36.99 KG/M2 | RESPIRATION RATE: 18 BRPM

## 2023-09-18 ENCOUNTER — TELEPHONE (OUTPATIENT)
Dept: OPHTHALMOLOGY | Facility: CLINIC | Age: 58
End: 2023-09-18
Payer: MEDICAID

## 2023-09-19 ENCOUNTER — ANESTHESIA EVENT (OUTPATIENT)
Dept: SURGERY | Facility: HOSPITAL | Age: 58
End: 2023-09-19
Payer: MEDICAID

## 2023-09-19 NOTE — ANESTHESIA PREPROCEDURE EVALUATION
09/19/2023  Marcie Bazan is a 57 y.o., female.      Pre-op Assessment    I have reviewed the Patient Summary Reports.    I have reviewed the NPO Status.   I have reviewed the Medications.     Review of Systems  Anesthesia Hx:  Denies Family Hx of Anesthesia complications.   Denies Personal Hx of Anesthesia complications.   Social:  Alcohol Use    EENT/Dental:   Eyes:   Cardiovascular:   Hypertension    Pulmonary:   Shortness of breath Sleep Apnea    Hepatic/GI:   PUD, Liver Disease, Hepatitis Liver transplant   Musculoskeletal:   Arthritis   Spine Disorders: lumbar Disc disease, Degenerative disease and Chronic Pain    Neurological:   Neuromuscular Disease,    Psych:   Psychiatric History        Hypertension Liver cirrhosis   Hyponatremia End stage liver disease   Anemia of chronic disease Coagulopathy   Liver transplant candidate Alcoholic hepatitis   Alcohol abuse Obesity (BMI 30-39.9)   Encounter for blood transfusion History of alcohol abuse   Hyperlipidemia Arthritis   Cancer Dyspnea on exertion   Hypersomnia Sleep apnea   Prediabetes      Surgical History    CHOLECYSTECTOMY HYSTERECTOMY   BREAST BIOPSY COLONOSCOPY   COLONOSCOPY LIVER TRANSPLANT   INJECTION OF PIRIFORMIS MUSCLE INJECTION OF ANESTHETIC AGENT INTO SACROILIAC JOINT   INJECTION OF JOINT INJECTION OF ANESTHETIC AGENT INTO SACROILIAC JOINT   EXCISIONAL HEMORRHOIDECTOMY INJECTION OF ANESTHETIC AGENT AROUND PUDENDAL NERVE   EXAMINATION UNDER ANESTHESIA INJECTION OF JOINT   INJECTION OF ANESTHETIC AGENT INTO SACROILIAC JOINT INJECTION OF PIRIFORMIS MUSCLE   INJECTION OF ANESTHETIC AGENT INTO SACROILIAC JOINT INJECTION OF JOINT   INJECTION OF JOINT INJECTION OF PIRIFORMIS MUSCLE   INJECTION OF ANESTHETIC AGENT INTO SACROILIAC JOINT INJECTION OF JOINT   BREAST LUMPECTOMY SELECTIVE INJECTION OF ANESTHETIC AGENT AROUND LUMBAR SPINAL NERVE ROOT BY  TRANSFORAMINAL APPROACH   EPIDURAL STEROID INJECTION INTO CERVICAL SPINE INJECTION OF PIRIFORMIS MUSCLE   INJECTION OF ANESTHETIC AGENT INTO SACROILIAC JOINT INJECTION OF JOINT   SELECTIVE INJECTION OF ANESTHETIC AGENT AROUND LUMBAR SPINAL NERVE ROOT BY TRANSFORAMINAL APPROACH BILATERAL SALPINGO-OOPHORECTOMY (BSO)   INJECTION OF JOINT COLONOSCOPY   EPIDURAL STEROID INJECTION INJECTION OF ANESTHETIC AGENT INTO SACROILIAC JOINT   TRANSFORAMINAL EPIDURAL INJECTION OF STEROID ESOPHAGOGASTRODUO         Physical Exam  General: Well nourished, Cooperative, Alert and Oriented    Airway:  Mouth Opening: Normal  TM Distance: Normal  Tongue: Normal        Anesthesia Plan  Type of Anesthesia, risks & benefits discussed:    Anesthesia Type: MAC  Intra-op Monitoring Plan: Standard ASA Monitors  Post Op Pain Control Plan: multimodal analgesia and IV/PO Opioids PRN  Induction:  IV  Informed Consent: Informed consent signed with the Patient and all parties understand the risks and agree with anesthesia plan.  All questions answered.   ASA Score: 3  Day of Surgery Review of History & Physical: H&P Update referred to the surgeon/provider.I have interviewed and examined the patient. I have reviewed the patient's H&P dated: There are no significant changes. H&P completed by Anesthesiologist.    Ready For Surgery From Anesthesia Perspective.     .

## 2023-09-19 NOTE — H&P
History    Chief complaint:  Painless progressive vision loss    Present Ilness/Diagnosis: Nuclear sclerotic Cataract    Past Medical History:  has a past medical history of Alcohol abuse, Alcoholic hepatitis, Anemia of chronic disease, Arthritis, Cancer (12/26/2017), Coagulopathy, Dyspnea on exertion (3/26/2020), Encounter for blood transfusion, End stage liver disease, History of alcohol abuse, Hyperlipidemia, Hypersomnia (9/11/2020), Hypertension, Hyponatremia, Liver cirrhosis, Liver transplant candidate (12/26/2017), Obesity (BMI 30-39.9) (1/5/2016), Prediabetes (4/22/2023), and Sleep apnea.    Family History/Social History: refer to chart    Allergies: Review of patient's allergies indicates:  No Known Allergies    Current Medications: No current facility-administered medications for this encounter.    Current Outpatient Medications:     amitriptyline (ELAVIL) 25 MG tablet, TAKE 1 TO 2 TABLETS(25 TO 50 MG) BY MOUTH EVERY NIGHT AS NEEDED FOR INSOMNIA OR PAIN, Disp: 60 tablet, Rfl: 0    atorvastatin (LIPITOR) 40 MG tablet, Take 1 tablet (40 mg total) by mouth every evening., Disp: 90 tablet, Rfl: 2    cyclobenzaprine (FLEXERIL) 10 MG tablet, TAKE 1/2 TO 1 TABLET BY MOUTH THREE TIMES DAILY AS NEEDED FOR MUSCLE SPASMS, Disp: 90 tablet, Rfl: 1    DULoxetine (CYMBALTA) 20 MG capsule, TAKE 1 CAPSULE(20 MG) BY MOUTH EVERY DAY, Disp: 30 capsule, Rfl: 0    famotidine (PEPCID) 20 MG tablet, Take 1 tablet (20 mg total) by mouth 2 (two) times a day., Disp: 180 tablet, Rfl: 3    fluticasone propionate (FLONASE) 50 mcg/actuation nasal spray, 1 spray (50 mcg total) by Each Nostril route once daily., Disp: 9.9 mL, Rfl: 3    furosemide (LASIX) 40 MG tablet, TAKE 1 TABLET BY MOUTH EVERY DAY, Disp: 90 tablet, Rfl: 5    HYDROcodone-acetaminophen (NORCO)  mg per tablet, Take 1 tablet by mouth every 6 (six) hours as needed for Pain., Disp: 12 tablet, Rfl: 0    ketorolac 0.5% (ACULAR) 0.5 % Drop, Place 1 drop into the right eye  3 (three) times daily., Disp: 5 mL, Rfl: 3    ketorolac 0.5% (ACULAR) 0.5 % Drop, Place 1 drop into the left eye 3 (three) times daily., Disp: 5 mL, Rfl: 3    levocetirizine (XYZAL) 5 MG tablet, Take 1 tablet (5 mg total) by mouth every evening., Disp: 30 tablet, Rfl: 11    losartan (COZAAR) 25 MG tablet, Take 1 tablet (25 mg total) by mouth once daily., Disp: 90 tablet, Rfl: 3    magnesium oxide (MAG-OX) 400 mg (241.3 mg magnesium) tablet, TAKE 1 TABLET(400 MG) BY MOUTH TWICE DAILY, Disp: 60 tablet, Rfl: 2    natrexone tablet 4.5 mg, Take 1 tablet (4.5 mg total) by mouth every evening., Disp: 30 tablet, Rfl: 2    NIFEdipine (PROCARDIA-XL) 60 MG (OSM) 24 hr tablet, TAKE 1 TABLET(60 MG) BY MOUTH EVERY EVENING, Disp: 90 tablet, Rfl: 3    ofloxacin (OCUFLOX) 0.3 % ophthalmic solution, Place 1 drop into the right eye 3 (three) times daily., Disp: 5 mL, Rfl: 3    ofloxacin (OCUFLOX) 0.3 % ophthalmic solution, Place 1 drop into the left eye 3 (three) times daily., Disp: 5 mL, Rfl: 3    ondansetron (ZOFRAN) 8 MG tablet, Take 1 tablet (8 mg total) by mouth every 12 (twelve) hours as needed for Nausea., Disp: 60 tablet, Rfl: 5    ondansetron (ZOFRAN-ODT) 4 MG TbDL, ondansetron Take 1 Tablet (oral) 3 times per day PRN - Nausea [id number] tablet,disintegrating 3 times per day oral No set duration recorded No set duration amount recorded active 4 mg, Disp: , Rfl:     pantoprazole (PROTONIX) 40 MG tablet, Take 1 tablet (40 mg total) by mouth every morning., Disp: 90 tablet, Rfl: 3    potassium chloride (KLOR-CON) 10 MEQ TbSR, Take 1 tablet (10 mEq total) by mouth 2 (two) times daily., Disp: 180 tablet, Rfl: 1    prednisolon/gatiflox/bromfenac (PREDNISOL ACE-GATIFLOX-BROMFEN) 1-0.5-0.075 % DrpS, Apply 1 drop to eye 3 (three) times daily., Disp: 5 mL, Rfl: 3    prednisoLONE acetate (PRED FORTE) 1 % DrpS, Place 1 drop into the right eye 3 (three) times daily., Disp: 5 mL, Rfl: 3    prednisoLONE acetate (PRED FORTE) 1 % DrpS, Place 1  drop into the left eye 3 (three) times daily., Disp: 5 mL, Rfl: 3    pregabalin (LYRICA) 225 MG Cap, TAKE 1 CAPSULE(225 MG) BY MOUTH TWICE DAILY Strength: 225 mg, Disp: 60 capsule, Rfl: 1    tacrolimus (PROGRAF) 1 MG Cap, TAKE 2 CAPSULES BY MOUTH EVERY MORNING AND 1 CAPSULE EVERY EVENING, Disp: 90 capsule, Rfl: 2    traMADoL (ULTRAM) 50 mg tablet, TAKE 1/2 TO 1 TABLET(25 TO 50 MG) BY MOUTH EVERY 8 HOURS AS NEEDED FOR PAIN Strength: 50 mg, Disp: 21 tablet, Rfl: 0    Facility-Administered Medications Ordered in Other Encounters:     ondansetron injection 4 mg, 4 mg, Intravenous, Once PRN, Ras Caballero MD    ondansetron injection 4 mg, 4 mg, Intravenous, Once PRN, Ras Caballero MD    Physical Exam    BP: Vital signs stable  General: No apparent distress  HEENT: nuclear sclerotic cataract  Lungs: adequate respirations  Heart: + pulses  Abdomen: soft  Rectal/pelvic: deferred    Impression: Visually significant Cataract.    See previous clinic notes for surgical indications.    Plan: Phacoemulsification with implantation of Intraocular lens

## 2023-09-19 NOTE — PRE-PROCEDURE INSTRUCTIONS
The following was discussed with pt via phone and sent to pt portal. Pt verbalized understanding.    - Nothing to eat or drink after midinight, except AM meds with small sips of water  - Hold all Diabetic meds AM of surgery  - Hold all Insulin AM of surgery  - Hold all Fluid pills AM of surgery  - Hold all non-insulin shots until after surgery (Ozempic, Mounjaro, Trulicity, Victoza, Byetta, Wegovy and Adlyxin) (up to 7 days prior)  - Take all B/P meds, except those that contain a fluid pill  - Hold all vits and herbal meds AM of surgery  - Use inhalers as needed and bring AM of surgery  - Take blood thinner meds AM of surgery  - Use eye drops as directed  - Shower and wash face with dial soap for 3 mins PM prior and AM of surgery  - No powder, lotions, creams, (makeup),  or jewelry    - Wear comfortable clothing (button up shirt)     (Patients ride may not leave while patient is in surgery)     -- 2nd floor surgery ctr at [facility] @ [address] [facility], [place] [address]

## 2023-09-20 ENCOUNTER — HOSPITAL ENCOUNTER (OUTPATIENT)
Facility: HOSPITAL | Age: 58
Discharge: HOME | End: 2023-09-20
Attending: OPHTHALMOLOGY | Admitting: OPHTHALMOLOGY
Payer: MEDICAID

## 2023-09-20 ENCOUNTER — ANESTHESIA (OUTPATIENT)
Dept: SURGERY | Facility: HOSPITAL | Age: 58
End: 2023-09-20
Payer: MEDICAID

## 2023-09-20 VITALS
WEIGHT: 200 LBS | OXYGEN SATURATION: 97 % | RESPIRATION RATE: 18 BRPM | TEMPERATURE: 98 F | DIASTOLIC BLOOD PRESSURE: 73 MMHG | BODY MASS INDEX: 35.44 KG/M2 | HEART RATE: 78 BPM | HEIGHT: 63 IN | SYSTOLIC BLOOD PRESSURE: 129 MMHG

## 2023-09-20 DIAGNOSIS — H25.11 NUCLEAR SCLEROTIC CATARACT OF RIGHT EYE: ICD-10-CM

## 2023-09-20 DIAGNOSIS — H25.10 AGE-RELATED NUCLEAR CATARACT: ICD-10-CM

## 2023-09-20 PROCEDURE — 94761 N-INVAS EAR/PLS OXIMETRY MLT: CPT

## 2023-09-20 PROCEDURE — 27201423 OPTIME MED/SURG SUP & DEVICES STERILE SUPPLY: Performed by: OPHTHALMOLOGY

## 2023-09-20 PROCEDURE — 71000015 HC POSTOP RECOV 1ST HR: Performed by: OPHTHALMOLOGY

## 2023-09-20 PROCEDURE — 99900035 HC TECH TIME PER 15 MIN (STAT)

## 2023-09-20 PROCEDURE — 66984 PR REMOVAL, CATARACT, W/INSRT INTRAOC LENS, W/O ENDO CYCLO: ICD-10-PCS | Mod: RT,,, | Performed by: OPHTHALMOLOGY

## 2023-09-20 PROCEDURE — V2632 POST CHMBR INTRAOCULAR LENS: HCPCS | Performed by: OPHTHALMOLOGY

## 2023-09-20 PROCEDURE — D9220A PRA ANESTHESIA: Mod: ,,, | Performed by: REGISTERED NURSE

## 2023-09-20 PROCEDURE — 66984 XCAPSL CTRC RMVL W/O ECP: CPT | Mod: RT,,, | Performed by: OPHTHALMOLOGY

## 2023-09-20 PROCEDURE — 63600175 PHARM REV CODE 636 W HCPCS: Performed by: REGISTERED NURSE

## 2023-09-20 PROCEDURE — 37000008 HC ANESTHESIA 1ST 15 MINUTES: Performed by: OPHTHALMOLOGY

## 2023-09-20 PROCEDURE — 25000003 PHARM REV CODE 250: Performed by: OPHTHALMOLOGY

## 2023-09-20 PROCEDURE — 36000707: Performed by: OPHTHALMOLOGY

## 2023-09-20 PROCEDURE — D9220A PRA ANESTHESIA: ICD-10-PCS | Mod: ,,, | Performed by: REGISTERED NURSE

## 2023-09-20 PROCEDURE — 36000706: Performed by: OPHTHALMOLOGY

## 2023-09-20 PROCEDURE — 37000009 HC ANESTHESIA EA ADD 15 MINS: Performed by: OPHTHALMOLOGY

## 2023-09-20 DEVICE — LENS EYHANCE +20.0D: Type: IMPLANTABLE DEVICE | Site: EYE | Status: FUNCTIONAL

## 2023-09-20 RX ORDER — MOXIFLOXACIN 5 MG/ML
1 SOLUTION/ DROPS OPHTHALMIC
Status: COMPLETED | OUTPATIENT
Start: 2023-09-20 | End: 2023-09-20

## 2023-09-20 RX ORDER — LIDOCAINE HYDROCHLORIDE 10 MG/ML
INJECTION, SOLUTION EPIDURAL; INFILTRATION; INTRACAUDAL; PERINEURAL
Status: DISCONTINUED | OUTPATIENT
Start: 2023-09-20 | End: 2023-09-20 | Stop reason: HOSPADM

## 2023-09-20 RX ORDER — LIDOCAINE HYDROCHLORIDE 40 MG/ML
INJECTION, SOLUTION RETROBULBAR
Status: DISCONTINUED | OUTPATIENT
Start: 2023-09-20 | End: 2023-09-20 | Stop reason: HOSPADM

## 2023-09-20 RX ORDER — TETRACAINE HYDROCHLORIDE 5 MG/ML
1 SOLUTION OPHTHALMIC
Status: DISCONTINUED | OUTPATIENT
Start: 2023-09-20 | End: 2023-09-20 | Stop reason: HOSPADM

## 2023-09-20 RX ORDER — ERGOCALCIFEROL 1.25 MG/1
50000 CAPSULE ORAL
COMMUNITY
End: 2023-12-01

## 2023-09-20 RX ORDER — MOXIFLOXACIN 5 MG/ML
SOLUTION/ DROPS OPHTHALMIC
Status: DISCONTINUED | OUTPATIENT
Start: 2023-09-20 | End: 2023-09-20 | Stop reason: HOSPADM

## 2023-09-20 RX ORDER — TETRACAINE HYDROCHLORIDE 5 MG/ML
SOLUTION OPHTHALMIC
Status: DISCONTINUED | OUTPATIENT
Start: 2023-09-20 | End: 2023-09-20 | Stop reason: HOSPADM

## 2023-09-20 RX ORDER — PROPARACAINE HYDROCHLORIDE 5 MG/ML
1 SOLUTION/ DROPS OPHTHALMIC DAILY PRN
Status: DISCONTINUED | OUTPATIENT
Start: 2023-09-20 | End: 2023-09-20 | Stop reason: HOSPADM

## 2023-09-20 RX ORDER — PREDNISOLONE ACETATE 10 MG/ML
SUSPENSION/ DROPS OPHTHALMIC
Status: DISCONTINUED | OUTPATIENT
Start: 2023-09-20 | End: 2023-09-20 | Stop reason: HOSPADM

## 2023-09-20 RX ORDER — CYCLOP/TROP/PROPA/PHEN/KET/WAT 1-1-0.1%
1 DROPS (EA) OPHTHALMIC (EYE)
Status: COMPLETED | OUTPATIENT
Start: 2023-09-20 | End: 2023-09-20

## 2023-09-20 RX ORDER — MIDAZOLAM HYDROCHLORIDE 1 MG/ML
INJECTION, SOLUTION INTRAMUSCULAR; INTRAVENOUS
Status: DISCONTINUED | OUTPATIENT
Start: 2023-09-20 | End: 2023-09-20

## 2023-09-20 RX ORDER — PHENYLEPHRINE HYDROCHLORIDE 100 MG/ML
1 SOLUTION/ DROPS OPHTHALMIC
Status: DISCONTINUED | OUTPATIENT
Start: 2023-09-20 | End: 2023-09-20 | Stop reason: HOSPADM

## 2023-09-20 RX ORDER — PROPARACAINE HYDROCHLORIDE 5 MG/ML
1 SOLUTION/ DROPS OPHTHALMIC
Status: DISCONTINUED | OUTPATIENT
Start: 2023-09-20 | End: 2023-09-20 | Stop reason: HOSPADM

## 2023-09-20 RX ORDER — ACETAMINOPHEN 325 MG/1
650 TABLET ORAL EVERY 4 HOURS PRN
Status: DISCONTINUED | OUTPATIENT
Start: 2023-09-20 | End: 2023-09-20 | Stop reason: HOSPADM

## 2023-09-20 RX ORDER — SODIUM CHLORIDE 0.9 % (FLUSH) 0.9 %
2 SYRINGE (ML) INJECTION
Status: DISCONTINUED | OUTPATIENT
Start: 2023-09-20 | End: 2023-09-20 | Stop reason: HOSPADM

## 2023-09-20 RX ADMIN — MOXIFLOXACIN OPHTHALMIC 1 DROP: 5 SOLUTION/ DROPS OPHTHALMIC at 09:09

## 2023-09-20 RX ADMIN — MOXIFLOXACIN 1 DROP: 5 SOLUTION/ DROPS OPHTHALMIC at 10:09

## 2023-09-20 RX ADMIN — Medication 1 DROP: at 08:09

## 2023-09-20 RX ADMIN — Medication 1 DROP: at 09:09

## 2023-09-20 RX ADMIN — MIDAZOLAM HYDROCHLORIDE 2 MG: 1 INJECTION, SOLUTION INTRAMUSCULAR; INTRAVENOUS at 09:09

## 2023-09-20 RX ADMIN — MOXIFLOXACIN OPHTHALMIC 1 DROP: 5 SOLUTION/ DROPS OPHTHALMIC at 08:09

## 2023-09-20 NOTE — PLAN OF CARE
Discharge instructions given and explained to patient with verbalization of understanding all instructions. Next time and doses of each medication told to patient. Patients v/s stable, denies n/v and tolerating po, rates pain level tolerable, IV removed, and family at bedside for patient discharge home.

## 2023-09-20 NOTE — ANESTHESIA POSTPROCEDURE EVALUATION
Anesthesia Post Evaluation    Patient: Marcie Bazan    Procedure(s) Performed: Procedure(s) (LRB):  EXTRACTION, CATARACT, WITH IOL INSERTION (Right)    Final Anesthesia Type: MAC      Patient location during evaluation: PACU  Patient participation: Yes- Able to Participate  Level of consciousness: awake and alert  Post-procedure vital signs: reviewed and stable  Pain management: adequate  Airway patency: patent    PONV status at discharge: No PONV  Anesthetic complications: no      Cardiovascular status: blood pressure returned to baseline  Respiratory status: unassisted, spontaneous ventilation and room air  Hydration status: euvolemic  Follow-up not needed.          Vitals Value Taken Time   /90 09/20/23 1002     09/20/23 1003   Pulse 78 09/20/23 1002   Resp 12 09/20/23 1003   SpO2 99 % 09/20/23 1002   Vitals shown include unvalidated device data.      No case tracking events are documented in the log.      Pain/Larry Score: Larry Score: 9 (9/20/2023 10:00 AM)

## 2023-09-20 NOTE — DISCHARGE SUMMARY
[facility]  Discharge Note  Short Stay    Procedure(s) (LRB):  EXTRACTION, CATARACT, WITH IOL INSERTION (Right)    BRIEF DISCHARGE NOTE:    Date of discharge: 09/20/2023    Reason for hospitalization -  Cataract surgery     Final Diagnosis - Visually significant Cataract    Procedures and treatment provided - Status post phacoemulsification with placement of intraocular lens     Diet - Advance to regular as tolerated    Activity - as tolerated    Disposition at the end of the case - Good.    Discharge: to home    The patient tolerated the procedure well and knows to follow up with me tomorrow morning in the eye clinic, sooner if needed.    Patient and family instructions (as appropriate) - Given to patient on discharge    Kim Mullen MD     No, not prescribed...

## 2023-09-20 NOTE — OP NOTE
DATE OF PROCEDURE: 09/20/2023    SURGEON: LILLIAN SHIRLEY MD    PREOPERATIVE DIAGNOSIS:  Senile nuclear sclerotic cataract right eye.     POSTOPERATIVE DIAGNOSIS: Senile nuclear sclerotic cataract right eye.     PROCEDURE PERFORMED:  Phacoemulsification with placement of intraocular lens, right eye.    IMPLANT:  DIBOO  20.0    ANESTHESIA:  Topical and MAC    COMPLICATIONS: none    ESTIMATED BLOOD LOSS: <1cc    SPECIMENS: none    INDICATIONS FOR PROCEDURE:   The patient has a history of painless progressive vision loss.  The patient has described difficulties with activities of daily living, which specifically include driving, which is secondary to cataract formation and progression. After we had a thorough discussion about risks, benefits, and alternatives to cataract surgery, the patient agreed to proceed with phacoemulsification and implantation of a lens in the right eye.  These risks include, but are not limited to, hemorrhage, pain, infection, need for additional surgery, need for glasses or contacts, loss of vision, or even loss of the eye.    PROCEDURE IN DETAIL:  The patient was met in the preop holding area.  Consent was confirmed to be signed.  The operative site was marked.  The patient was brought into the operating room by the anesthesia team and placed under monitored anesthesia care.  The right eye was prepped and draped in a sterile ophthalmic fashion.  A Radha speculum was placed into the right eye.   A paracentesis site was made and 1% preservative-free lidocaine was injected into the anterior chamber.  Viscoelastic  material was injected into the anterior chamber.  A keratome blade was used to make a clear corneal incision.  A cystotome was used to initiate the continuous curvilinear capsulorrhexis which was completed with Utrata forceps.  BSS on a sage cannula was used to perform hydrodissection.  The phacoemulsification tip was introduced into the eye and the nucleus was removed in a  standard divide-and-conquer fashion.  Remaining cortical material was removed from the eye using irrigation-aspiration.  The capsular bag was filled with viscoelastic material and the intraocular lens was injected and positioned into place. Remaining viscoelastic material was removed from the eye using irrigation and aspiration.  The corneal wounds were hydrated.  The eye was filled to physiologic pressure. The wounds were found to be watertight. Drops of Vigamox and prednisilone were placed into the eye.  The eye was washed, dried, and shielded.  The patient tolerated the procedure well and knows to follow up with me tomorrow morning, sooner if needed.

## 2023-09-20 NOTE — TRANSFER OF CARE
"Anesthesia Transfer of Care Note    Patient: Marcie Bazan    Procedure(s) Performed: Procedure(s) (LRB):  EXTRACTION, CATARACT, WITH IOL INSERTION (Right)    Patient location: PACU    Anesthesia Type: MAC    Transport from OR: Transported from OR on room air with adequate spontaneous ventilation    Post pain: adequate analgesia    Post assessment: no apparent anesthetic complications and tolerated procedure well    Post vital signs: stable    Level of consciousness: awake    Nausea/Vomiting: no nausea/vomiting    Complications: none    Transfer of care protocol was followed      Last vitals:   Visit Vitals  /81 (BP Location: Right arm, Patient Position: Sitting)   Pulse 86   Temp 36.6 °C (97.9 °F) (Oral)   Resp 18   Ht 5' 3" (1.6 m)   Wt 90.7 kg (200 lb)   LMP 01/01/1985 (Approximate)   SpO2 96%   Breastfeeding No   BMI 35.43 kg/m²     "

## 2023-09-20 NOTE — DISCHARGE INSTRUCTIONS
Dr. Mullen   Cataract Post-Operative Instructions     Day of surgery:     -Resume drops THREE times daily into the operative eye.     -Do not rub your eye     -Wear protective sunglasses during the day.     -Resume moderate activity.     -Bathe/shower/wash face normally     -Do not apply makeup around the operative eye for 1 week.     -You should expect:     Blurry vision and halos for 24-48 hours     Dilated pupil for 24-48 hours     Scratchy feeling in the eye for 1-2 days     Curved shadow in your peripheral vision for 2-3 weeks     Occasional flickering of lights for up to 1 week     -If you experience severe pain or nausea, call Dr. Mullen or the on-call doctor at [phone].       Plan to see Dr. Mullen tomorrow at:     [facility] [address]     [address]LO[address]     [facility] [id number]     **Most patients can drive the next morning.  If you do not feel comfortable driving, please arrange for transportation. **

## 2023-09-21 ENCOUNTER — OFFICE VISIT (OUTPATIENT)
Dept: OPHTHALMOLOGY | Facility: CLINIC | Age: 58
End: 2023-09-21
Payer: MEDICAID

## 2023-09-21 DIAGNOSIS — Z96.1 STATUS POST CATARACT EXTRACTION AND INSERTION OF INTRAOCULAR LENS, RIGHT: Primary | ICD-10-CM

## 2023-09-21 DIAGNOSIS — Z98.41 STATUS POST CATARACT EXTRACTION AND INSERTION OF INTRAOCULAR LENS, RIGHT: Primary | ICD-10-CM

## 2023-09-21 PROCEDURE — 4010F ACE/ARB THERAPY RXD/TAKEN: CPT | Mod: CPTII,,, | Performed by: OPHTHALMOLOGY

## 2023-09-21 PROCEDURE — 3044F PR MOST RECENT HEMOGLOBIN A1C LEVEL <7.0%: ICD-10-PCS | Mod: CPTII,,, | Performed by: OPHTHALMOLOGY

## 2023-09-21 PROCEDURE — 99211 OFF/OP EST MAY X REQ PHY/QHP: CPT | Mod: PBBFAC | Performed by: OPHTHALMOLOGY

## 2023-09-21 PROCEDURE — 99999 PR PBB SHADOW E&M-EST. PATIENT-LVL I: ICD-10-PCS | Mod: PBBFAC,,, | Performed by: OPHTHALMOLOGY

## 2023-09-21 PROCEDURE — 99024 PR POST-OP FOLLOW-UP VISIT: ICD-10-PCS | Mod: ,,, | Performed by: OPHTHALMOLOGY

## 2023-09-21 PROCEDURE — 99999 PR PBB SHADOW E&M-EST. PATIENT-LVL I: CPT | Mod: PBBFAC,,, | Performed by: OPHTHALMOLOGY

## 2023-09-21 PROCEDURE — 99024 POSTOP FOLLOW-UP VISIT: CPT | Mod: ,,, | Performed by: OPHTHALMOLOGY

## 2023-09-21 PROCEDURE — 3044F HG A1C LEVEL LT 7.0%: CPT | Mod: CPTII,,, | Performed by: OPHTHALMOLOGY

## 2023-09-21 PROCEDURE — 1159F PR MEDICATION LIST DOCUMENTED IN MEDICAL RECORD: ICD-10-PCS | Mod: CPTII,,, | Performed by: OPHTHALMOLOGY

## 2023-09-21 PROCEDURE — 4010F PR ACE/ARB THEARPY RXD/TAKEN: ICD-10-PCS | Mod: CPTII,,, | Performed by: OPHTHALMOLOGY

## 2023-09-21 PROCEDURE — 1159F MED LIST DOCD IN RCRD: CPT | Mod: CPTII,,, | Performed by: OPHTHALMOLOGY

## 2023-09-21 NOTE — PROGRESS NOTES
HPI    NP Ref Dr. Aldana OD ([place])     Irregular Astigmatism vs Early Keratoconus  S/p Phaco IOL OD - 9/21/23    Meds: PF TID OD            Ocuflux TID OD             Ketorolac TID OD   Last edited by Kim Mullen MD on 9/21/2023  2:26 PM.            Assessment /Plan     For exam results, see Encounter Report.    Status post cataract extraction and insertion of intraocular lens, right      Slit Lamp Exam  L/L - normal  C/s - quiet  Cornea - clear  A/C - 1+ cell  Lens - PCIOL    POD #1 s/p phaco/IOL  - doing well  - continue the following drops:    vigamox or ocuflox TID x 1 wk then stop  Pred forte TID x  4 wks  Ketorolac TID until runs out    Versus:    Combination drop - 1 drop TID x total of 1 month    Appropriate precautions and post op medications reviewed.  Patient instructed to call or come in if symptoms of redness, decreased vision, or pain are experienced.    -f/up 1-2 wks, sooner PRN. Or 4 wks with [facility] for mrx if needed.

## 2023-09-22 ENCOUNTER — TELEPHONE (OUTPATIENT)
Dept: TRANSPLANT | Facility: CLINIC | Age: 58
End: 2023-09-22
Payer: MEDICAID

## 2023-09-24 DIAGNOSIS — M54.16 BILATERAL LUMBAR RADICULOPATHY: ICD-10-CM

## 2023-09-25 DIAGNOSIS — M54.16 BILATERAL LUMBAR RADICULOPATHY: ICD-10-CM

## 2023-09-25 RX ORDER — PREGABALIN 225 MG/1
CAPSULE ORAL
Qty: 60 CAPSULE | Refills: 1 | Status: SHIPPED | OUTPATIENT
Start: 2023-09-25 | End: 2024-02-20

## 2023-09-25 RX ORDER — PREGABALIN 225 MG/1
CAPSULE ORAL
Qty: 60 CAPSULE | OUTPATIENT
Start: 2023-09-25

## 2023-09-25 RX ORDER — PREGABALIN 225 MG/1
CAPSULE ORAL
Qty: 60 CAPSULE | Refills: 1 | Status: SHIPPED | OUTPATIENT
Start: 2023-09-25 | End: 2023-09-25 | Stop reason: SDUPTHER

## 2023-09-25 NOTE — TELEPHONE ENCOUNTER
Can you refill?    Last Visit:7/27  Procedure: 8/15  Next Visit:9/29  Last refill:7/19 w/ 1 refill

## 2023-09-26 ENCOUNTER — TELEPHONE (OUTPATIENT)
Dept: INTERNAL MEDICINE | Facility: CLINIC | Age: 58
End: 2023-09-26
Payer: MEDICAID

## 2023-09-26 NOTE — TELEPHONE ENCOUNTER
----- Message from Ernst Lang MA sent at 9/26/2023  4:07 PM CDT -----  The patient calling to speak with staff due to being unsuccessful with getting the swallow test scheduled. Please give her a call back at [phone]

## 2023-09-28 ENCOUNTER — TELEPHONE (OUTPATIENT)
Dept: CARDIOLOGY | Facility: HOSPITAL | Age: 58
End: 2023-09-28
Payer: MEDICAID

## 2023-09-28 NOTE — PROGRESS NOTES
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 Neurosurgery      3. Chronic pain of left knee  M25.562 719.46 Ambulatory referral/consult to Orthopedics    G89.29 338.29       4. Chronic pain syndrome  G89.4 338.4 DULoxetine (CYMBALTA) 30 MG capsule      5. Piriformis muscle pain  M79.18 729.1 cyclobenzaprine (FLEXERIL) 10 MG tablet      6. Insomnia secondary to chronic pain  G89.29 338.29 amitriptyline (ELAVIL) 25 MG tablet    G47.01 327.01           2. Pain Generators / Etiology :  Cervical spondylosis, lumbar spondylosis, lumbar radiculopathy, left hip pain.  3. Failed Meds (E- Effective, NE- Not Effective):  Gabapentin-E, Robaxin-effective  4. Physical Therapy - has been participating since December 2017  5. Psychological comorbidities -  none  6. Anticoagulants / Antiplatelets:  None     Plan:  1. Interventional:   - S/p right L5/S1 + S1 TF WILBERTO on 8/15/23 with limited pain relief.   - S/p Right SIJ + Right piriformis + Right GT bursa injection on 4/18/23 with limited pain relief   - S/p L4/5 IL WILBERTO (with right paramedian approach) on 2/28/23 (4 weeks ago) with limited pain relief.  - S/p right SIJ + right piriformis + right GT bursa injection on 10/11/22 with 75% pain relief, still reporting 9/10 pain.   - S/p Bilateral L4/5 TF WILBERTO on 8/18/22 with 85% pain relief - now localized right SIJ pain.   - S/p bilateral SIJ + bilateral GT bursa + bilateral piriformis injection on 4/21/22 with limited pain relief.  - S/p C5/6 IL WILBERTO on  1/4/22 with 100% pain relief   - S/p bilateral L4/5 TF WILBERTO on 10/28/21 with 90% pain relief   - S/p Bilateral SIJ + Bilateral Piriformis + Bilateral GT Bursa Injection on 4/15/21 with 75% pain relief - but continuing to have leg pain.  - S/p bilateral GT bursa injection on 02/08/2021 with 10% pain relief with Dr. Kaminski.  She feels pain is worse than prior to the procedure.  - S/p right SIJ + piriformis + GT bursa injection on 10/21/20 with 90% pain relief.     2. Pharmacologic:   - Refill Lyrica 225mg BID, amitriptyline 25-50mg QHS (Increased at previous visit).  - Refill Flexeril 10mg to take 1/2 to 1 tab TID PRN.  - Increase Cymbalta 20 mg to 30mg QD - started at previous visit, which was initially helping some. Consider Increase.  - Consider retrying naltrexone -- although too expensive in the past.  - No NSAIDs due to h/o transplant.   - Anticoagulation use: None.     3. Rehabilitative: Continue exercises and activities as tolerated. She went to PT, but she felt pain worse when she got home.    4. Diagnostic: BLE EMG/NCS reviewed with patient.    5. Consult/ Referral:   - Refer to [facility] since RLE radicular pain is not improving with injections.  - Refer to [facility] for left knee pain.   - Previously placed referral to Neurology for new onset headaches for a few months with associated dizziness & nausea.  She saw primary care who told her to ask our dept about the headaches. Per PCP note-believed to be occipital neuralgia. Headaches have resolved, so will hold off for now.    6. Follow up: PRN     - This condition does not require this patient to take time off of work, and the primary goal of our Pain Management services is to improve the patient's functional capacity.   - I discussed the risks, benefits, and alternatives to potential treatment options. All questions and concerns were fully addressed today in clinic.         Emilee Buitrago PA-C  Interventional Pain Management - [facility]  Ver[facility] Roth    Disclaimer:  This note was prepared using voice recognition system and is likely to have sound alike errors that may have been overlooked even after proof reading.  Please call me with any questions.

## 2023-09-28 NOTE — TELEPHONE ENCOUNTER
----- Message from Pham Dennis sent at 9/28/2023  2:33 PM CDT -----  Contact: Marcie Patterson called to michelle louie her 9/22 EKG for 10/6 at the [facility]. Please call her back at [phone].    Thanks  TS

## 2023-09-29 ENCOUNTER — OFFICE VISIT (OUTPATIENT)
Dept: PAIN MEDICINE | Facility: CLINIC | Age: 58
End: 2023-09-29
Payer: MEDICAID

## 2023-09-29 ENCOUNTER — PATIENT MESSAGE (OUTPATIENT)
Dept: PAIN MEDICINE | Facility: CLINIC | Age: 58
End: 2023-09-29

## 2023-09-29 ENCOUNTER — PATIENT MESSAGE (OUTPATIENT)
Dept: NEUROSURGERY | Facility: CLINIC | Age: 58
End: 2023-09-29
Payer: MEDICAID

## 2023-09-29 VITALS — RESPIRATION RATE: 17 BRPM

## 2023-09-29 DIAGNOSIS — G89.29 CHRONIC PAIN OF LEFT KNEE: ICD-10-CM

## 2023-09-29 DIAGNOSIS — M54.16 RIGHT LUMBAR RADICULOPATHY: ICD-10-CM

## 2023-09-29 DIAGNOSIS — G89.4 CHRONIC PAIN SYNDROME: ICD-10-CM

## 2023-09-29 DIAGNOSIS — R29.898 COMPLAINTS OF LEG WEAKNESS: Primary | ICD-10-CM

## 2023-09-29 DIAGNOSIS — M79.18 PIRIFORMIS MUSCLE PAIN: ICD-10-CM

## 2023-09-29 DIAGNOSIS — G89.29 INSOMNIA SECONDARY TO CHRONIC PAIN: ICD-10-CM

## 2023-09-29 DIAGNOSIS — M25.562 CHRONIC PAIN OF LEFT KNEE: ICD-10-CM

## 2023-09-29 DIAGNOSIS — G47.01 INSOMNIA SECONDARY TO CHRONIC PAIN: ICD-10-CM

## 2023-09-29 PROCEDURE — 1160F PR REVIEW ALL MEDS BY PRESCRIBER/CLIN PHARMACIST DOCUMENTED: ICD-10-PCS | Mod: CPTII,95,, | Performed by: PHYSICIAN ASSISTANT

## 2023-09-29 PROCEDURE — 1159F PR MEDICATION LIST DOCUMENTED IN MEDICAL RECORD: ICD-10-PCS | Mod: CPTII,95,, | Performed by: PHYSICIAN ASSISTANT

## 2023-09-29 PROCEDURE — 4010F ACE/ARB THERAPY RXD/TAKEN: CPT | Mod: CPTII,95,, | Performed by: PHYSICIAN ASSISTANT

## 2023-09-29 PROCEDURE — 99214 PR OFFICE/OUTPT VISIT, EST, LEVL IV, 30-39 MIN: ICD-10-PCS | Mod: 95,,, | Performed by: PHYSICIAN ASSISTANT

## 2023-09-29 PROCEDURE — 1160F RVW MEDS BY RX/DR IN RCRD: CPT | Mod: CPTII,95,, | Performed by: PHYSICIAN ASSISTANT

## 2023-09-29 PROCEDURE — 3044F PR MOST RECENT HEMOGLOBIN A1C LEVEL <7.0%: ICD-10-PCS | Mod: CPTII,95,, | Performed by: PHYSICIAN ASSISTANT

## 2023-09-29 PROCEDURE — 3044F HG A1C LEVEL LT 7.0%: CPT | Mod: CPTII,95,, | Performed by: PHYSICIAN ASSISTANT

## 2023-09-29 PROCEDURE — 4010F PR ACE/ARB THEARPY RXD/TAKEN: ICD-10-PCS | Mod: CPTII,95,, | Performed by: PHYSICIAN ASSISTANT

## 2023-09-29 PROCEDURE — 99214 OFFICE O/P EST MOD 30 MIN: CPT | Mod: 95,,, | Performed by: PHYSICIAN ASSISTANT

## 2023-09-29 PROCEDURE — 1159F MED LIST DOCD IN RCRD: CPT | Mod: CPTII,95,, | Performed by: PHYSICIAN ASSISTANT

## 2023-09-29 RX ORDER — DULOXETIN HYDROCHLORIDE 30 MG/1
30 CAPSULE, DELAYED RELEASE ORAL DAILY
Qty: 30 CAPSULE | Refills: 2 | Status: SHIPPED | OUTPATIENT
Start: 2023-09-29 | End: 2023-12-04

## 2023-09-29 RX ORDER — AMITRIPTYLINE HYDROCHLORIDE 25 MG/1
TABLET, FILM COATED ORAL
Qty: 60 TABLET | Refills: 0 | Status: SHIPPED | OUTPATIENT
Start: 2023-09-29 | End: 2023-12-27 | Stop reason: SDUPTHER

## 2023-09-29 RX ORDER — CYCLOBENZAPRINE HCL 10 MG
TABLET ORAL
Qty: 90 TABLET | Refills: 1 | Status: SHIPPED | OUTPATIENT
Start: 2023-09-29 | End: 2023-10-26

## 2023-10-02 NOTE — ASSESSMENT & PLAN NOTE
-OP follow up, consult GI if need.   -see abd distension     Pt is a 65 y.o. F with complaint of bowel urgency over the last couple years. Stopped drinking milk and eating less dairy but did not seem to help. In the past, pt was provided with slow and fast PFM contraction exercises that may have somewhat helped her bowel urgency, but then thought that more kegels may have stimulated more bladder problems.     Bladder: Comment:            Urination frequency About 7+ x/day   Urgency:  Bladder urgency may be related to need for a BM    Can have a strong bladder urge daily   Incontinence UUI not daily, a couple drops  Denies ZULLY   Pads 1 liner, maybe 2 with a leak   Nocturia 2x/night   Pain denies   Emptying WNL   Prolapse symptoms  Denies symptoms   Liquid consumption 1 mug coffee  Water WNL   UTI history 2x in last 2 years   Irritants          Bowel: Comment:        Frequency 3x/day   Urge Very strong moments of urgency daily   Incontinence Denies but worried about not making it in time   Emptying denies   Chester stool 4-6   Fiber    Management No red meat  No milk    Likes bread and bagels  2 servings fruits/veggies/day   Pain Denies  Hx of hemorrhoids    1 BM when awakes, 1 after coffee, 1-2 later in the day     OBGYN Comment:        Pregnancies 5   Births 4   Birth type vaginal   Tears/ episiotomies Episiotomy with 1st  Small tearing with others   Surgery denies   Menstruation menopause                   Sexual Activity Comment:    With partner   Type Minimally sexually active currently   Pain Denies pain or complaints with intimacy   Orgasm    Masturbation    Libido              Pain Comment:        denies                    Other Comment:        Physical activity  Hike 1x/wk  Walking more regularly   PLOF    Living environment    Other    Sleep              Systems Review:   Cord questions:  Pins and needles or tingling in both arms and both legs at the same time? denies  Problems with stumbling or falling? Denies     Cauda equina questions:  Problems with bowel and  bladder control? Specifically retention? See above  Pins and needles or numbness in the saddle area? Denies     Review of systems:  General - (chills, night sweats, recent infection, fever, weight loss/gain, unexplained night pain, excessive fatigue): Denies  Do you have a history of cancer? Denies  Gastrointestinal system - (abdominal pain, bowel changes, nausea, vomiting, bloating): Denies  Cardiovascular system - (chest pain, palpitations, orthopnea, other): Denies  Respiratory system - (cough, SOB, sputum production, other): Denies  Musculoskeletal system: osteoporosis, vertebral fracture? Denies  Endocrine - (polyuria, polydipsia, heat or cold intolerance, other): Denies  Neurological - (numbness/tingling, falling/stumbling, HA, dizziness, diplopia, dysphagia/dysarthria, double vision, tinnitus, memory, drop attacks, other): Denies  Any long-term steroid use? Denies      Patient goals: reduce fecal urgency, reduce urinary urgency    OBJECTIVE FINDINGS:      ASSESSMENT   PELVIS/POSTURE            LUMBAR AROM    Flexion    Extension    rotation    Sidebending        HIP ROM    ER    IR    Flexion    Extension    Ankle ROM    Lower Extremity Strength    hip flexion    hip extension    Hip IR    Hip ER    Hip abduction    Knee    Ankle            SPECIAL TESTS    Hamstring length    Hip FADIR    Hip SCOUR    Trendelenburg    Slump test    SLR test    CADE            LUMBAR PALPATION    HIP PALPATION    OTHER PALPATION        SI JOINT TESTING     S/L Compression test    Thigh thrust test    Prone sacral thrust test    Gaenslen test        BALANCE    GAIT MECHANICS    RUNNING MECHANICS    SQUATTING MECHANICS    LUNGING MECHANICS    BED MOBILITY    SIT TO STAND    FLOOR TO STAND      Verbal consent give for internal evaluation and treatment. yes    Pelvic Floor MMT and Function INITIAL EVALUATION 8/2/23 F/U ASSESSMENT 10/2/23   Consent to eval and treat yes yes   Assessment position Hooklying, draped with sheet   "hooklying   EXTERNAL PFM EXAM No TTP B  Min-mod tension B    PFM OBSERVATION     Skin integrity Atrophy  Minor presence of labia minora  Skin redness  Large skin tag anal opening \"   Contract Present, able to minimize gluteal contraction present   Relax present present   Bulge/pelvic drop present present   Cough absent absent   Perineal descent none none   Vaginal wall laxity Anterior, not to level of hymen Anterior, to level of hymen   Light touch sensation intact intact   INTERNAL Assessment performed Internal vaginal  internal vaginal   Levator Ani PERF       Power 3/5  3/5  Well coordinated with breathing  Mild slow to relax with inhale   Endurance 3 seconds  5   Repetitions 5  6   Quick Flicks * x in 10 sec     Vaginal introitus WNL hypotonicity   Nora-urethra No TTP endopelvic fascia  L>R presence of contract at layer 2 muscles No TTP  L>R presence of contract   Levator palpation Mod tension B  Able to relax with inhale  No TTP B Mod tension  B tenderness present  6-7 o'clock evidence of scarring   Puborectalis      Obturator Internus Neg TTP     Piriformis      TAILBONE          ABDOMEN     MMT strength  Improved TAC and breath coordination  WNL    Diastasis Recti None present     Pain None present  No LLQ fullness present  No bladder urgency     Breathing pattern WNL     Hip flexors No TTP    Skin integumentary/incision lines WNL          PFDI OUTCOME MEASURE 50 total; 8.3 POPDI, 12.5 CRAD, 29.2 SMOOTH 53.1 total; 0 POPDI, 28.1 CRAD, 25 SMOOTH   Adductors      Biofeedback                  TREATMENT LOG:  verbal consent for internal intervention: yes  Diagnosis  Bowel and bladder urgency    Precautions       PT INITIAL EVALUATION:   8/2/23     PT PROGRESS NOTE:        Y/N Treatment Details: Time:   MODALITIES  36332   0 mins   Heat       Ice       THER ACT          80328   15 mins   OUTCOME MEASURES  PFDI    POC discussion y     Review of symptoms  Tests/Measures y     Falls Screen, Medication Review, VS, pain " assessment Y      Education:  h    h    h  h Pelvic floor anatomy/ purpose function    POP precautions: avoid pushing/straining; bend from knees/hips; no holding breath  Hemorrhoid edu  Vaginal atrophy edu: use of vaginal estrogen or coconut oil prn    *Pt verbalized understanding of education and returned demonstration appropriately*    Bladder Education       y  h  y    y  y Healthy bladder norms  Bladder irritants  WNL fluid intake for bladder (pt likely WNL)  GOAL: 2-4 hours b/n bathroom trips  8 sec rule  Bladder urgency suppression strategies review    Try stopping flow of urine x1  Tally urinary freq current    Bowel Education         h  h          y      h Healthy bowel norms  Bowel irritants  Consistent meal times, consistent meal ingredients, night of rest and digest  GOAL: 1 serving fruit or veg with each meal for now  Fiber: may consider small dose of fiber supplement and assess response over the course of the week    No greater than 15 min on the toilet; add deep breathing to relax  FECAL urge suppression: RAIR reflex, 40 sec hold; deep breathing, mental distraction; cont to practice prn when away from home (grocery store)  - practice RAIR perhaps after breakfast, delay urge for 10-15 min then use BR    Toilet Posture edu/demo/practice y Toilet seat positioning, edu and rationale    Postural edu      HEP / HEP Review Y eval: deep breathing  8/11/23: toilet seat positioning; happy baby str, adductor str; 20x5 sec coordination PFMC with exhale  8/31/23: hip abd, hip add, bridge, wall squat, plank at counter, row  9/13/23: bladder urgency suppression strategies  9/20/23: coordination exercises (50%>100, 100>50, 50>3 quick flicks)    THER EX         18778   8 mins   SUPINE THEREX:   h  y  h  y TAC/PFMC in isolation 15x at pace of breath  TAC with ADD, 5 sec hold  TAC with hip ABD gtb, 5 sec hold  TAC with bridge, hip add with ball    *all with breath coordination    SEATED THEREX:         STANDING THEREX:    n  n  n  h  n  h  h  h  n - shoulder row, gtb, in tandem stance  - wall squat 2 sets to fatigue , with hip add  - counter plank 1 set to fatigue B, with march  - wall plank at elbows 1 set - just breathing    RAIR practice: 40 sec holds   - shoulder mid row   - shoulder low row   - shoulder hADD at bolster   - hip add with ball      STRETCHING:   Y  Cat and cow w/inhale relax and exhale contract           NEURO RE-ED    72219   30 mins   Diaphragmatic breathing y  y Practice in adductor butterfly  Practice in happy baby - grabbing feet    Postural Re-Edu      Pelvic Coordination y  y  y  y Pelvic eval: edu on findings   - pelvic coordination practice:      - inhale and relax practice      - exhale and contract x8          Coordination with EMG Biofeedback n Pelvic floor muscle strengthening with [employer] biofeedback unit, external sensors placed at 9 and 3 o'clock of anal opening, grounding electrode placed on ischial tuberosity:   - SUPINE: HELD     - quick 10x     - slow 10x5 sec *slow to relax initially   - SEATED: YES     - quick 10x     - slow 5x5 sec      - slow 5x10 sec     - 50%>100% x10     - 100%>50% 10x     - 50%>3 quick flicks 10x     - 1x40 sec  - STANDING: YES     - quick 10x     - slow 5x10 sec     - 50%>100% x5     - 100%>50%     - 50%>3 quick flicks 5x     - 1x40 sec      Biofeedback Objective Findings h    n SUPINE REST: 2-3 uV range  SUPINE WORK: 15 uV for 5-10 seconds  SEATED REST: 0 uV   SEATED WORK: 15-20 uV for 10 sec  STANDING REST: 5>3 uV range, reduced quickly  STANDING WORK: 15-20 uV for 10 sec plateau    *40 sec attempt 15>10 uV reduction in plateau, in seated  *slow to relax 50-75% of the time    MANUAL                 33044   0 mins   Joint Mobilization       Deep Tissue mobilizations External      Deep Tissue Mobilization Internal      IASTM/MFR/TrP Release              PLAN:  - talk about laxity   - check bowel fullness again - practice bowel massage   - review all edu to  date

## 2023-10-03 ENCOUNTER — TELEPHONE (OUTPATIENT)
Dept: RHEUMATOLOGY | Facility: CLINIC | Age: 58
End: 2023-10-03
Payer: MEDICAID

## 2023-10-03 ENCOUNTER — TELEPHONE (OUTPATIENT)
Dept: OPHTHALMOLOGY | Facility: CLINIC | Age: 58
End: 2023-10-03
Payer: MEDICAID

## 2023-10-03 NOTE — TELEPHONE ENCOUNTER
----- Message from Noé Oakes sent at 10/3/2023  9:19 AM CDT -----  Contact: [phone]  Pt daughter is calling to reschedule the post op for another date. Please call back to further assist.

## 2023-10-03 NOTE — TELEPHONE ENCOUNTER
----- Message from Susanna Tejada sent at 10/3/2023 11:25 AM CDT -----  Regarding: Referral      What is the request in detail: Please call pt to schedule Orthopedic referral.Please advise.    Can the clinic reply by [facility]? No    Best Call Back Number: [phone]    Additional Information:

## 2023-10-03 NOTE — TELEPHONE ENCOUNTER
Returning patient's call.  Explained that Rayne Brothers is no longer with orthopedic.  Please call her  [phone].

## 2023-10-05 ENCOUNTER — TELEPHONE (OUTPATIENT)
Dept: OPHTHALMOLOGY | Facility: CLINIC | Age: 58
End: 2023-10-05
Payer: MEDICAID

## 2023-10-05 NOTE — TELEPHONE ENCOUNTER
----- Message from Lyly Curry sent at 10/5/2023  1:28 PM CDT -----  Regarding: advice  Contact: self @ [phone]  Pt is calling to get clarification on her eye drops.  She would like to know which ones to use now and which ones to use before surgery.  Pt says she missed her PO appt on 10-3-23.  She would like to know if she can have the PO appt when she comes in for her surgery on 10-11-23.  Pls call.

## 2023-10-06 ENCOUNTER — HOSPITAL ENCOUNTER (OUTPATIENT)
Dept: RADIOLOGY | Facility: HOSPITAL | Age: 58
Discharge: HOME | End: 2023-10-06
Attending: PHYSICIAN ASSISTANT
Payer: MEDICAID

## 2023-10-06 ENCOUNTER — HOSPITAL ENCOUNTER (OUTPATIENT)
Dept: CARDIOLOGY | Facility: HOSPITAL | Age: 58
Discharge: HOME | End: 2023-10-06
Attending: PHYSICIAN ASSISTANT
Payer: MEDICAID

## 2023-10-06 ENCOUNTER — LAB VISIT (OUTPATIENT)
Dept: LAB | Facility: HOSPITAL | Age: 58
End: 2023-10-06
Attending: PHYSICIAN ASSISTANT
Payer: MEDICAID

## 2023-10-06 ENCOUNTER — CLINICAL SUPPORT (OUTPATIENT)
Dept: SPEECH THERAPY | Facility: HOSPITAL | Age: 58
End: 2023-10-06
Attending: PHYSICIAN ASSISTANT
Payer: MEDICAID

## 2023-10-06 DIAGNOSIS — D69.6 THROMBOCYTOPENIA: Chronic | ICD-10-CM

## 2023-10-06 DIAGNOSIS — R13.10 DYSPHAGIA, UNSPECIFIED TYPE: ICD-10-CM

## 2023-10-06 DIAGNOSIS — R63.5 ABNORMAL WEIGHT GAIN: ICD-10-CM

## 2023-10-06 DIAGNOSIS — E05.90 HYPERTHYROIDISM: Primary | ICD-10-CM

## 2023-10-06 DIAGNOSIS — E55.9 VITAMIN D DEFICIENCY: ICD-10-CM

## 2023-10-06 DIAGNOSIS — R73.03 PREDIABETES: Chronic | ICD-10-CM

## 2023-10-06 DIAGNOSIS — R06.02 SHORTNESS OF BREATH: ICD-10-CM

## 2023-10-06 DIAGNOSIS — I49.9 CARDIAC ARRHYTHMIA, UNSPECIFIED CARDIAC ARRHYTHMIA TYPE: ICD-10-CM

## 2023-10-06 LAB
25(OH)D3+25(OH)D2 SERPL-MCNC: 34 NG/ML (ref 30–96)
BASOPHILS # BLD AUTO: 0.05 K/UL (ref 0–0.2)
BASOPHILS NFR BLD: 0.5 % (ref 0–1.9)
DIFFERENTIAL METHOD: ABNORMAL
EOSINOPHIL # BLD AUTO: 0.4 K/UL (ref 0–0.5)
EOSINOPHIL NFR BLD: 4.1 % (ref 0–8)
ERYTHROCYTE [DISTWIDTH] IN BLOOD BY AUTOMATED COUNT: 15.3 % (ref 11.5–14.5)
ESTIMATED AVG GLUCOSE: 117 MG/DL (ref 68–131)
FERRITIN SERPL-MCNC: 89 NG/ML (ref 20–300)
FOLATE SERPL-MCNC: 3.1 NG/ML (ref 4–24)
HBA1C MFR BLD: 5.7 % (ref 4–5.6)
HCT VFR BLD AUTO: 41.5 % (ref 37–48.5)
HGB BLD-MCNC: 13.1 G/DL (ref 12–16)
IMM GRANULOCYTES # BLD AUTO: 0.02 K/UL (ref 0–0.04)
IMM GRANULOCYTES NFR BLD AUTO: 0.2 % (ref 0–0.5)
IRON SERPL-MCNC: 37 UG/DL (ref 30–160)
LYMPHOCYTES # BLD AUTO: 2.1 K/UL (ref 1–4.8)
LYMPHOCYTES NFR BLD: 19.1 % (ref 18–48)
MCH RBC QN AUTO: 25.6 PG (ref 27–31)
MCHC RBC AUTO-ENTMCNC: 31.6 G/DL (ref 32–36)
MCV RBC AUTO: 81 FL (ref 82–98)
MONOCYTES # BLD AUTO: 0.6 K/UL (ref 0.3–1)
MONOCYTES NFR BLD: 5.8 % (ref 4–15)
NEUTROPHILS # BLD AUTO: 7.6 K/UL (ref 1.8–7.7)
NEUTROPHILS NFR BLD: 70.3 % (ref 38–73)
NRBC BLD-RTO: 0 /100 WBC
PLATELET # BLD AUTO: 244 K/UL (ref 150–450)
PMV BLD AUTO: 9.6 FL (ref 9.2–12.9)
RBC # BLD AUTO: 5.11 M/UL (ref 4–5.4)
SATURATED IRON: 12 % (ref 20–50)
T4 FREE SERPL-MCNC: 1.05 NG/DL (ref 0.71–1.51)
TOTAL IRON BINDING CAPACITY: 318 UG/DL (ref 250–450)
TRANSFERRIN SERPL-MCNC: 215 MG/DL (ref 200–375)
TSH SERPL DL<=0.005 MIU/L-ACNC: <0.01 UIU/ML (ref 0.4–4)
VIT B12 SERPL-MCNC: 755 PG/ML (ref 210–950)
WBC # BLD AUTO: 10.79 K/UL (ref 3.9–12.7)

## 2023-10-06 PROCEDURE — 93010 EKG 12-LEAD: ICD-10-PCS | Mod: ,,, | Performed by: INTERNAL MEDICINE

## 2023-10-06 PROCEDURE — 92610 EVALUATE SWALLOWING FUNCTION: CPT | Mod: 59

## 2023-10-06 PROCEDURE — 82728 ASSAY OF FERRITIN: CPT | Performed by: PHYSICIAN ASSISTANT

## 2023-10-06 PROCEDURE — 71046 X-RAY EXAM CHEST 2 VIEWS: CPT | Mod: 26,,, | Performed by: RADIOLOGY

## 2023-10-06 PROCEDURE — 83540 ASSAY OF IRON: CPT | Performed by: PHYSICIAN ASSISTANT

## 2023-10-06 PROCEDURE — 74230 X-RAY XM SWLNG FUNCJ C+: CPT | Mod: TC

## 2023-10-06 PROCEDURE — 93010 ELECTROCARDIOGRAM REPORT: CPT | Mod: ,,, | Performed by: INTERNAL MEDICINE

## 2023-10-06 PROCEDURE — 92611 MOTION FLUOROSCOPY/SWALLOW: CPT

## 2023-10-06 PROCEDURE — 84466 ASSAY OF TRANSFERRIN: CPT | Performed by: PHYSICIAN ASSISTANT

## 2023-10-06 PROCEDURE — 36415 COLL VENOUS BLD VENIPUNCTURE: CPT | Performed by: PHYSICIAN ASSISTANT

## 2023-10-06 PROCEDURE — 85025 COMPLETE CBC W/AUTO DIFF WBC: CPT | Mod: 91 | Performed by: PHYSICIAN ASSISTANT

## 2023-10-06 PROCEDURE — 84443 ASSAY THYROID STIM HORMONE: CPT | Performed by: PHYSICIAN ASSISTANT

## 2023-10-06 PROCEDURE — 71046 XR CHEST PA AND LATERAL: ICD-10-PCS | Mod: 26,,, | Performed by: RADIOLOGY

## 2023-10-06 PROCEDURE — A9698 NON-RAD CONTRAST MATERIALNOC: HCPCS | Performed by: PHYSICIAN ASSISTANT

## 2023-10-06 PROCEDURE — 71046 X-RAY EXAM CHEST 2 VIEWS: CPT | Mod: TC

## 2023-10-06 PROCEDURE — 25500020 PHARM REV CODE 255: Performed by: PHYSICIAN ASSISTANT

## 2023-10-06 PROCEDURE — 74230 FL MODIFIED BARIUM SWALLOW SPEECH STUDY: ICD-10-PCS | Mod: 26,,, | Performed by: RADIOLOGY

## 2023-10-06 PROCEDURE — 74230 X-RAY XM SWLNG FUNCJ C+: CPT | Mod: 26,,, | Performed by: RADIOLOGY

## 2023-10-06 PROCEDURE — 82306 VITAMIN D 25 HYDROXY: CPT | Performed by: PHYSICIAN ASSISTANT

## 2023-10-06 PROCEDURE — 93005 ELECTROCARDIOGRAM TRACING: CPT

## 2023-10-06 PROCEDURE — 82746 ASSAY OF FOLIC ACID SERUM: CPT | Performed by: PHYSICIAN ASSISTANT

## 2023-10-06 PROCEDURE — 82607 VITAMIN B-12: CPT | Performed by: PHYSICIAN ASSISTANT

## 2023-10-06 PROCEDURE — 84439 ASSAY OF FREE THYROXINE: CPT | Performed by: PHYSICIAN ASSISTANT

## 2023-10-06 PROCEDURE — 83036 HEMOGLOBIN GLYCOSYLATED A1C: CPT | Performed by: PHYSICIAN ASSISTANT

## 2023-10-06 RX ADMIN — BARIUM SULFATE 50 ML: 0.81 POWDER, FOR SUSPENSION ORAL at 11:10

## 2023-10-06 NOTE — PROGRESS NOTES
See MBSS.     Ashley Guzman, CCC-SLP, CBIS   Speech Language Pathologist   Certified Brain Injury Specialist   10/6/2023

## 2023-10-06 NOTE — PLAN OF CARE
[facility]   Outpatient MODIFIED BARIUM SWALLOW STUDY    Date: 10/6/2023     Name: Marcie Bazan   MRN: [id number]    Therapy Diagnosis: WFL oral and pharyngeal phases of the swallow    Physician: Jenny Suggs*  Physician Orders: Fl Modified Barium Swallow Speech/SLP Video Swallow  Medical Diagnosis from Referral: R13.10 (ICD-10-CM) - Dysphagia, unspecified type    Date of Evaluation:  10/6/2023    Procedure Min.   Swallow and Oral Function Evaluation   10   Fl Modified Barium Swallow Speech  15     Time in: 10:35 AM   Time out: 11:00 AM   Total Billable Time: 25 minutes    Precautions: Standard  Subjective   History of Current Condition: Marcie Bazan is a 58 y.o. female here today for Modified Barium Swallow Study (MBSS). Patient reported choking with and without PO intake. She reports she will sometimes choke on saliva while sleeping. She has a history of GERD and takes daily Protonix.     -Recommended diet from previous study: N/A  -Therapy received: N/A    The following observations were made:   -Mental status: Alert and Cooperative  -Factors affecting performance: no difficulties participating in the study  -Feeding Method: independent in self-feeding    Respiratory Status:   -Respiratory Status: room air    Past Medical History: Marcie Bazan  has a past medical history of Alcohol abuse, Alcoholic hepatitis, Anemia of chronic disease, Arthritis, Cancer (12/26/2017), Coagulopathy, Dyspnea on exertion (3/26/2020), Encounter for blood transfusion, End stage liver disease, History of alcohol abuse, Hyperlipidemia, Hypersomnia (9/11/2020), Hypertension, Hyponatremia, Liver cirrhosis, Liver transplant candidate (12/26/2017), Obesity (BMI 30-39.9) (1/5/2016), Prediabetes (4/22/2023), and Sleep apnea.  Marcie Bazan  has a past surgical history that includes Cholecystectomy; Hysterectomy (1986); Breast biopsy (Left); Colonoscopy (N/A, 12/01/2017); Colonoscopy (N/A, 12/05/2017); Liver  transplant (12/2017); Injection of piriformis muscle (Right, 06/14/2019); Injection of anesthetic agent into sacroiliac joint (Right, 06/14/2019); Injection of joint (Bilateral, 02/07/2020); Injection of anesthetic agent into sacroiliac joint (Bilateral, 02/07/2020); Excisional hemorrhoidectomy (N/A, 02/18/2020); Injection of anesthetic agent around pudendal nerve (N/A, 02/18/2020); Examination under anesthesia (N/A, 02/18/2020); Injection of joint (Bilateral, 07/07/2020); Injection of anesthetic agent into sacroiliac joint (Bilateral, 07/07/2020); Injection of piriformis muscle (Right, 10/21/2020); Injection of anesthetic agent into sacroiliac joint (Right, 10/21/2020); Injection of joint (Right, 10/21/2020); Injection of joint (Bilateral, 02/08/2021); Injection of piriformis muscle (Bilateral, 04/15/2021); Injection of anesthetic agent into sacroiliac joint (Bilateral, 04/15/2021); Injection of joint (Bilateral, 04/15/2021); Breast lumpectomy; Selective injection of anesthetic agent around lumbar spinal nerve root by transforaminal approach (Bilateral, 10/28/2021); Epidural steroid injection into cervical spine (N/A, 01/04/2022); Injection of piriformis muscle (Bilateral, 04/21/2022); Injection of anesthetic agent into sacroiliac joint (Bilateral, 04/21/2022); Injection of joint (Bilateral, 04/21/2022); Selective injection of anesthetic agent around lumbar spinal nerve root by transforaminal approach (Bilateral, 08/18/2022); Bilateral salpingo-oophorectomy (BSO) (Bilateral, 1986); Injection of joint (Right, 10/11/2022); Colonoscopy (N/A, 12/12/2022); Epidural steroid injection (N/A, 2/28/2023); Injection of anesthetic agent into sacroiliac joint (Right, 4/18/2023); Transforaminal epidural injection of steroid (Right, 8/15/2023); Esophagogastroduodenoscopy (N/A, 9/14/2023); and Cataract extraction w/  intraocular lens implant (Right, 9/20/2023).    Medical Hx and Allergies: Review of patient's allergies  "indicates:  No Known Allergies      Objective     Modified Barium Swallow Study  Purpose: to evaluate anatomy and physiology of the oropharyngeal swallow, to determine effectiveness of rehabilitation strategies, and to determine diet consistency and intervention recommendations. The study was performed using the "Gold Standard" of 30 fps with as low as reasonably achievable (ALARA) exposure.     The patient was seen in radiology seated in [place]'s position in a video imaging chair for lateral views of the larynx and an A/P view. The study was conducted using Varibar thin liquid (IDDSI 0), Varibar nectar liquid (IDDSI 2), Varibar pudding (IDDSI 4), Peaches mixed with Varibar thin liquid (IDDSI 6/0), and solid coated in Varibar pudding (IDDSI 7). She tolerated the procedure well.    A cranial nerve evaluation revealed:   Cranial Nerve Examination  Cranial Nerve 5: Trigeminal Nerve  Motor Jaw Posture at rest: Closed  Mandible Elevation/Depression: WFL  Mandible lateralization: WFL  Abnormal movement: absent Interpretation: Intact bilaterally    Sensory Forehead: WFL  Cheek: WFL  Jaw: WFL  Facial Pain: None noted Interpretation: Intact bilaterally      Cranial Nerve 7: Facial Nerve  Motor Facial Symmetry: WNL  Wrinkle Forehead: WFL  Close eyes tightly: WFL  Labial Protrusion: WFL  Labial Retraction: WFL  Buccal Strength with Labial Seal: WFL  Abnormal movement: absent Interpretation: Intact bilaterally    Sensory Formal testing not completed.       Cranial Nerves IX and X: Glossopharyngeal and Vagus Nerves  Motor Palatal Symmetry (Rest): WNL  Palatal Symmetry (Movement): WNL  Cough: Perceptually strong  Voice Prior to PO intake: Clear  Resonance: Normal  Abnormal movement: absent Interpretation: Intact bilaterally      Cranial Nerve XII: Hypoglossal Nerve  Motor Tongue at rest: WNL  Lingual Protrusion: WNL  Lingual Protrusion against Resistance: WNL  Lingual Lateralization: WNL  Abnormal movement: absent " Interpretation: Intact bilaterally      Other information:  Volitional Swallow: Able to palpate laryngeal rise  Mucosal Quality: No abnormal findings  Secretion Management: no overt deficits noted/observed  Dentition: Good condition for speech and mastication        Consistency  Presentation  Findings Strategy Attempted Rosenbeck's Penetration/Aspiration Scale (PAS)   Thin (IDDSI 0) Method: Self-fed    Volume: cup sip x5,  sequential sips    Projection: lateral view; AP view  Oral phase: WFL    Pharyngeal phase: Penetration during the swallow    Esophageal screen: WFL  Best: (2) Material enters the airway, remains above the vocal folds, and is ejected from the airway    Worst: (3) Material enters the airway, remains above the vocal folds, and is not ejected from the airway     Nectar thick (mildly thick/IDDSI 2) Method:Self-fed    Volume: cup sip x2     Projection: lateral view Oral phase: WFL    Pharyngeal phase: trace penetration during swallow.      Best: (1) Material does not enter the airway    Worst: (2) Material enters the airway, remains above the vocal folds, and is ejected from the airway     Puree (extremely thick/ IDDSI 4) Method: Self-fed    Volume: tbsp bite x4     Projection: lateral view Oral phase: WFL    Pharyngeal phase: WFL        Best: (1) Material does not enter the airway    Worst: (1) Material does not enter the airway     Solid (regular/ IDDSI 7) Method: Self-fed    Volume: bite x3     Projection: lateral view, AP view Oral phase: Piecemeal swallow observed     Pharyngeal phase: WFL    Esophageal screen: Retention at medial esophagus   Best: (1) Material does not enter the airway    Worst: (1) Material does not enter the airway     Mixed consistency (thin/ IDDSI 0 + soft and bite sized/ IDDSI 6) Method: Self-fed    Volume: bite x2     Projection: lateral view Oral phase: WFL    Pharyngeal phase: WFL  Best: (1) Material does not enter the airway    Worst: (1) Material does not enter the  airway     Barium tablet  Method: Self-fed    Volume: single tablet with water bolus(es)    Projection: AP view Timely and efficient oropharyngeal clearance         Treatment   Treatment Time In: n/a  Treatment Time Out: n/a  Total Treatment Time: n/a  Patient educated regarding results and recommendations of the evaluation. See the recommendations section below.    Education: Plan of Care and anatomy and physiology of swallow mechanism as it relates to MBSS findings and recommendations were discussed with the patient. Patient expressed understanding. All questions were answered.     Assessment     Marcie Bazan is a 58 y.o. female referred for Modified Barium Swallow Study with a medical diagnosis of Dysphagia, unspecified type. The patient presents with Oral and pharyngeal phases of the swallow WFL as determined by the Dysphagia Outcome and Severity Scale (CELESTINA). Level 7: Normal in all situations.    Modified Barium Swallow Study (MBSS) revealed oral phase characterized by adequate lingual and labial strength and range of motion for tongue control, bolus preparation and transport. Lip closure was adequate with no labial escape. Bolus prep and mastication was timely and efficient. Lingual motion was brisk for adequate bolus transport. There was no significant oral residue. The swallow was initiated when the head of the bolus passed the ramus of the mandible.    Pharyngeal phase characterized by timely initiation of swallow across consistencies. The soft palate elevated for complete closure of the velopharyngeal port. During pharyngeal swallow, minimally reduced base of tongue retraction, anterior hyoid excursion, laryngeal elevation, and pharyngeal stripping wave resulted in incomplete epiglottic inversion and UES opening with  penetration of thin liquids and nectar.. No aspiration or significant pharyngeal residue was observed in today's study.     Esophageal screening significant for dysmotility with retention  at medial esophagus noted. Further esophageal imaging including barium esophagram as well as follow-up with GI is recommended.      Impressions: Patient presents with WFL oral and pharyngeal phases of the swallow. In consideration of the Dynamic Imaging Grade of Swallowing Toxicity (DIGEST) (Juan et al, 2017), patient presents with preserved safety of swallow and preserved efficiency of swallow. Patient appears to be at low risk for aspiration related PNA in consideration of three pillars of aspiration pneumonia (Rivka, 2005) including preserved oral health status, overall health/immune status, and laryngeal vestibule closure/severity of dysphagia. However, unable to assess risk related to aspiration pneumonia cause by the aspiration of gastric content. Patient would not benefit from swallowing rehabilitation at this time.     Functional Oral Intake Scale (FOIS)  The Functional Oral Intake Scale (FOIS) is an ordinal scale that is used to assess the current status and meaningful change in the oral intake. FOIS levels include:    TUBE DEPENDENT (levels 1-3) 1. No oral intake  2. Tube dependent with minimal/inconsistent oral intake  3. Tube supplements with consistent oral intake      TOTAL ORAL INTAKE (levels 4-7) 4. Total oral intake of a single consistency  5. Total oral intake of multiple consistencies requiring special preparation  6. Total oral intake with no special preparation, but must avoid specific foods or liquid items  7. Total oral intake with no restrictions     Patient is currently judged to be at FOIS level 7.      References:  ELKIN Tineo. (2005, March). Pneumonia: Factors Beyond Aspiration. Perspectives in Swallowing and Swallowing Disorders (Dysphagia), 14, 10-16.  Lakisha KA, Felice MP, Jose DA, Winston LINDSEYK, Lisbeth HY, Kym RS, Ilia CD, Perez SY, Rayshawn CP, Amirah J, Lazarus CL, May A, Arabella J, David JW, Bianka HM, Jacinto JS. Dynamic Imaging Grade of Swallowing Toxicity (DIGEST): Scale  development and validation. Cancer. 2017 Jan 1;123(1):62-70. doi: 10.1002/cncr.42612. Epub 2016 Aug 26. PMID: 12020880; PMCID: [id number].  GUIDO Bishop, ELKIN Leggett, MAIA Gannon, & ELKIN Pimentel (2021). Diagnostic Accuracy of an Esophageal Screening Protocol Interpreted by the Speech-Language Pathologist. Dysphagia, 36(6), 1622-9741. [email or web address]    Recommendations:     Consistency Recommendations:  Thin liquids (IDDSI 0) and Regular consistencies (IDDSI 7).   Risk Management/Swallow Guidelines: use good oral hygiene , sit upright for all PO intake, increase physical mobility as tolerated, and behavioral reflux precautions  Specialist Referrals: GI and ENT  Ancillary Tests: Consider laryngoscopy/ Barium esophagram    Therapy: Dysphagia therapy is not recommended at this time.  Follow-up exam: Follow up swallow study is not indicated at this time.    Please contact [facility] at [phone] if there are questions re: the above or if we can be of additional service to this patient.      Ashley Guzman, CCC-SLP, CBIS   Speech Language Pathologist   Certified Brain Injury Specialist   10/6/2023

## 2023-10-08 ENCOUNTER — ANESTHESIA EVENT (OUTPATIENT)
Dept: SURGERY | Facility: HOSPITAL | Age: 58
End: 2023-10-08
Payer: MEDICAID

## 2023-10-09 ENCOUNTER — TELEPHONE (OUTPATIENT)
Dept: OPHTHALMOLOGY | Facility: CLINIC | Age: 58
End: 2023-10-09
Payer: MEDICAID

## 2023-10-09 ENCOUNTER — TELEPHONE (OUTPATIENT)
Dept: INTERNAL MEDICINE | Facility: CLINIC | Age: 58
End: 2023-10-09
Payer: MEDICAID

## 2023-10-09 DIAGNOSIS — R05.3 CHRONIC COUGH: ICD-10-CM

## 2023-10-09 DIAGNOSIS — E53.8 FOLIC ACID DEFICIENCY: Primary | ICD-10-CM

## 2023-10-09 DIAGNOSIS — R13.10 DYSPHAGIA, UNSPECIFIED TYPE: Primary | ICD-10-CM

## 2023-10-09 RX ORDER — FOLIC ACID 1 MG/1
1 TABLET ORAL DAILY
Qty: 90 TABLET | Refills: 3 | Status: SHIPPED | OUTPATIENT
Start: 2023-10-09 | End: 2024-03-12 | Stop reason: SDUPTHER

## 2023-10-09 NOTE — H&P
History    Chief complaint:  Painless progressive vision loss    Present Ilness/Diagnosis: Nuclear sclerotic Cataract    Past Medical History:  has a past medical history of Alcohol abuse, Alcoholic hepatitis, Anemia of chronic disease, Arthritis, Cancer (12/26/2017), Coagulopathy, Dyspnea on exertion (3/26/2020), Encounter for blood transfusion, End stage liver disease, History of alcohol abuse, Hyperlipidemia, Hypersomnia (9/11/2020), Hypertension, Hyponatremia, Liver cirrhosis, Liver transplant candidate (12/26/2017), Obesity (BMI 30-39.9) (1/5/2016), Prediabetes (4/22/2023), and Sleep apnea.    Family History/Social History: refer to chart    Allergies: Review of patient's allergies indicates:  No Known Allergies    Current Medications: No current facility-administered medications for this encounter.    Current Outpatient Medications:     amitriptyline (ELAVIL) 25 MG tablet, TAKE 1 TO 2 TABLETS(25 TO 50 MG) BY MOUTH EVERY NIGHT AS NEEDED FOR INSOMNIA OR PAIN, Disp: 60 tablet, Rfl: 0    atorvastatin (LIPITOR) 40 MG tablet, Take 1 tablet (40 mg total) by mouth every evening., Disp: 90 tablet, Rfl: 2    cyclobenzaprine (FLEXERIL) 10 MG tablet, TAKE 1/2 TO 1 TABLET BY MOUTH THREE TIMES DAILY AS NEEDED FOR MUSCLE SPASMS, Disp: 90 tablet, Rfl: 1    DULoxetine (CYMBALTA) 30 MG capsule, Take 1 capsule (30 mg total) by mouth once daily., Disp: 30 capsule, Rfl: 2    famotidine (PEPCID) 20 MG tablet, Take 1 tablet (20 mg total) by mouth 2 (two) times a day., Disp: 180 tablet, Rfl: 3    fluticasone propionate (FLONASE) 50 mcg/actuation nasal spray, 1 spray (50 mcg total) by Each Nostril route once daily., Disp: 9.9 mL, Rfl: 3    furosemide (LASIX) 40 MG tablet, TAKE 1 TABLET BY MOUTH EVERY DAY, Disp: 90 tablet, Rfl: 5    levocetirizine (XYZAL) 5 MG tablet, Take 1 tablet (5 mg total) by mouth every evening., Disp: 30 tablet, Rfl: 11    losartan (COZAAR) 25 MG tablet, Take 1 tablet (25 mg total) by mouth once daily., Disp: 90  tablet, Rfl: 3    magnesium oxide (MAG-OX) 400 mg (241.3 mg magnesium) tablet, TAKE 1 TABLET(400 MG) BY MOUTH TWICE DAILY, Disp: 60 tablet, Rfl: 2    natrexone tablet 4.5 mg, Take 1 tablet (4.5 mg total) by mouth every evening., Disp: 30 tablet, Rfl: 2    NIFEdipine (PROCARDIA-XL) 60 MG (OSM) 24 hr tablet, TAKE 1 TABLET(60 MG) BY MOUTH EVERY EVENING, Disp: 90 tablet, Rfl: 3    pantoprazole (PROTONIX) 40 MG tablet, Take 1 tablet (40 mg total) by mouth every morning., Disp: 90 tablet, Rfl: 3    potassium chloride (KLOR-CON) 10 MEQ TbSR, Take 1 tablet (10 mEq total) by mouth 2 (two) times daily., Disp: 180 tablet, Rfl: 1    pregabalin (LYRICA) 225 MG Cap, TAKE 1 CAPSULE(225 MG) BY MOUTH TWICE DAILY Strength: 225 mg, Disp: 60 capsule, Rfl: 1    tacrolimus (PROGRAF) 1 MG Cap, TAKE 2 CAPSULES BY MOUTH EVERY MORNING AND 1 CAPSULE EVERY EVENING, Disp: 90 capsule, Rfl: 2    traMADoL (ULTRAM) 50 mg tablet, TAKE 1/2 TO 1 TABLET(25 TO 50 MG) BY MOUTH EVERY 8 HOURS AS NEEDED FOR PAIN Strength: 50 mg, Disp: 21 tablet, Rfl: 0    ergocalciferol (VITAMIN D2) 50,000 unit Cap, Take 50,000 Units by mouth every 7 days. Sunday, Disp: , Rfl:     ketorolac 0.5% (ACULAR) 0.5 % Drop, Place 1 drop into the right eye 3 (three) times daily., Disp: 5 mL, Rfl: 3    ketorolac 0.5% (ACULAR) 0.5 % Drop, Place 1 drop into the left eye 3 (three) times daily., Disp: 5 mL, Rfl: 3    ofloxacin (OCUFLOX) 0.3 % ophthalmic solution, Place 1 drop into the right eye 3 (three) times daily., Disp: 5 mL, Rfl: 3    ofloxacin (OCUFLOX) 0.3 % ophthalmic solution, Place 1 drop into the left eye 3 (three) times daily., Disp: 5 mL, Rfl: 3    ondansetron (ZOFRAN) 8 MG tablet, Take 1 tablet (8 mg total) by mouth every 12 (twelve) hours as needed for Nausea., Disp: 60 tablet, Rfl: 5    ondansetron (ZOFRAN-ODT) 4 MG TbDL, ondansetron Take 1 Tablet (oral) 3 times per day PRN - Nausea [id number] tablet,disintegrating 3 times per day oral No set duration recorded No set  duration amount recorded active 4 mg, Disp: , Rfl:     prednisolon/gatiflox/bromfenac (PREDNISOL ACE-GATIFLOX-BROMFEN) 1-0.5-0.075 % DrpS, Apply 1 drop to eye 3 (three) times daily. (Patient not taking: Reported on 9/21/2023), Disp: 5 mL, Rfl: 3    prednisoLONE acetate (PRED FORTE) 1 % DrpS, Place 1 drop into the right eye 3 (three) times daily., Disp: 5 mL, Rfl: 3    prednisoLONE acetate (PRED FORTE) 1 % DrpS, Place 1 drop into the left eye 3 (three) times daily., Disp: 5 mL, Rfl: 3    Facility-Administered Medications Ordered in Other Encounters:     ondansetron injection 4 mg, 4 mg, Intravenous, Once PRN, Ras Caballero MD    ondansetron injection 4 mg, 4 mg, Intravenous, Once PRN, Ras Caballero MD    Physical Exam    BP: Vital signs stable  General: No apparent distress  HEENT: nuclear sclerotic cataract  Lungs: adequate respirations  Heart: + pulses  Abdomen: soft  Rectal/pelvic: deferred    Impression: Visually significant Cataract.    See previous clinic notes for surgical indications.    Plan: Phacoemulsification with implantation of Intraocular lens

## 2023-10-09 NOTE — PRE-PROCEDURE INSTRUCTIONS
- Nothing to eat or drink after midinight, except AM meds with small sips of water, Gatorade/Powerade  - Hold all Diabetic meds AM of surgery  - Hold all Insulin AM of surgery  - Hold all Fluid pills AM of surgery  - Hold all non-insulin shots until after surgery (Ozempic, Mounjaro, Trulicity, Victoza, Byetta, Wegovy and Adlyxin) (up to 7 days prior)  - Take all B/P meds, except those that contain a fluid pill  - Hold all vits and herbal meds AM of surgery  - Use inhalers as needed and bring AM of surgery  - Take blood thinner meds AM of surgery  - Use eye drops as directed  - Shower and wash face with dial soap for 3 mins PM prior and AM of surgery  - No powder, lotions, creams, (makeup),  or jewelry    - Wear comfortable clothing (button up shirt)    (Patients ride may not leave while patient is in surgery)    -- 2nd floor surgery ctr at [facility] @ [address] [facility] [place] [address]       Pt voiced understanding

## 2023-10-09 NOTE — TELEPHONE ENCOUNTER
----- Message from Ashley Guzman CCC-SLP sent at 10/6/2023  4:29 PM CDT -----  Regarding: MBSS Follow up  Jenny Sugsg,     Car[place] MBSS was completed on this patient today. Results indicated no aspiration and no significant residue. Patient swallow was functional for all consistencies. Patient may benefit from an ENT evaluation.   Please see patient's chart for full evaluation. Thank you for the referral, and please contact if you have any questions or concerns.     Ashley Guzman CCC-SLP, CBIS   Speech Language Pathologist   Certified Brain Injury Specialist   10/6/2023

## 2023-10-09 NOTE — TELEPHONE ENCOUNTER
Spoke with pt provided arrival time of 9:00 for sx on 10/11/23 with Dr. Mullen @ [facility]. Pt has eyedrops and packet.

## 2023-10-10 ENCOUNTER — TELEPHONE (OUTPATIENT)
Dept: INTERNAL MEDICINE | Facility: CLINIC | Age: 58
End: 2023-10-10
Payer: MEDICAID

## 2023-10-10 ENCOUNTER — PATIENT MESSAGE (OUTPATIENT)
Dept: ADMINISTRATIVE | Facility: OTHER | Age: 58
End: 2023-10-10
Payer: MEDICAID

## 2023-10-10 DIAGNOSIS — R13.10 DYSPHAGIA, UNSPECIFIED TYPE: Primary | ICD-10-CM

## 2023-10-10 NOTE — TELEPHONE ENCOUNTER
----- Message from Jewell Jay MA sent at 10/10/2023  4:21 PM CDT -----  Regarding: FW: MBSS Follow up    ----- Message -----  From: Jenny Suggs PA-C  Sent: 10/9/2023   4:53 PM CDT  To: Ifeanyi YEUNG Staff (Int Med)  Subject: FW: MBSS Follow up                               Refer to ent for swallowing difficulties. Please let pt know that this was recommended by the speech therapist.     ----- Message -----  From: Ashley Guzman CCC-SLP  Sent: 10/6/2023   4:34 PM CDT  To: Jenny Suggs PA-C  Subject: MBSS Follow up                                   Jenny Suggs,     Caridad MBSS was completed on this patient today. Results indicated no aspiration and no significant residue. Patient swallow was functional for all consistencies. Patient may benefit from an ENT evaluation.   Please see patient's chart for full evaluation. Thank you for the referral, and please contact if you have any questions or concerns.     CEM Amador, CBIS   Speech Language Pathologist   Certified Brain Injury Specialist   10/6/2023

## 2023-10-11 ENCOUNTER — TELEPHONE (OUTPATIENT)
Dept: TRANSPLANT | Facility: CLINIC | Age: 58
End: 2023-10-11
Payer: MEDICAID

## 2023-10-11 ENCOUNTER — ANESTHESIA (OUTPATIENT)
Dept: SURGERY | Facility: HOSPITAL | Age: 58
End: 2023-10-11
Payer: MEDICAID

## 2023-10-11 ENCOUNTER — HOSPITAL ENCOUNTER (OUTPATIENT)
Facility: HOSPITAL | Age: 58
Discharge: HOME | End: 2023-10-11
Attending: OPHTHALMOLOGY | Admitting: OPHTHALMOLOGY
Payer: MEDICAID

## 2023-10-11 ENCOUNTER — PATIENT MESSAGE (OUTPATIENT)
Dept: TRANSPLANT | Facility: CLINIC | Age: 58
End: 2023-10-11
Payer: MEDICAID

## 2023-10-11 VITALS
OXYGEN SATURATION: 96 % | TEMPERATURE: 97 F | HEART RATE: 82 BPM | RESPIRATION RATE: 17 BRPM | SYSTOLIC BLOOD PRESSURE: 130 MMHG | DIASTOLIC BLOOD PRESSURE: 72 MMHG

## 2023-10-11 DIAGNOSIS — R79.89 ELEVATED LIVER FUNCTION TESTS: ICD-10-CM

## 2023-10-11 DIAGNOSIS — H25.11 NUCLEAR SCLEROTIC CATARACT OF RIGHT EYE: Primary | ICD-10-CM

## 2023-10-11 DIAGNOSIS — Z94.4 LIVER REPLACED BY TRANSPLANT: Primary | ICD-10-CM

## 2023-10-11 DIAGNOSIS — H25.10 AGE-RELATED NUCLEAR CATARACT: ICD-10-CM

## 2023-10-11 PROCEDURE — 71000015 HC POSTOP RECOV 1ST HR: Performed by: OPHTHALMOLOGY

## 2023-10-11 PROCEDURE — 63600175 PHARM REV CODE 636 W HCPCS: Performed by: NURSE ANESTHETIST, CERTIFIED REGISTERED

## 2023-10-11 PROCEDURE — 37000008 HC ANESTHESIA 1ST 15 MINUTES: Performed by: OPHTHALMOLOGY

## 2023-10-11 PROCEDURE — D9220A PRA ANESTHESIA: Mod: ,,, | Performed by: NURSE ANESTHETIST, CERTIFIED REGISTERED

## 2023-10-11 PROCEDURE — 25000003 PHARM REV CODE 250: Performed by: OPHTHALMOLOGY

## 2023-10-11 PROCEDURE — 94761 N-INVAS EAR/PLS OXIMETRY MLT: CPT

## 2023-10-11 PROCEDURE — 27201423 OPTIME MED/SURG SUP & DEVICES STERILE SUPPLY: Performed by: OPHTHALMOLOGY

## 2023-10-11 PROCEDURE — 36000706: Performed by: OPHTHALMOLOGY

## 2023-10-11 PROCEDURE — 99900035 HC TECH TIME PER 15 MIN (STAT)

## 2023-10-11 PROCEDURE — V2632 POST CHMBR INTRAOCULAR LENS: HCPCS | Performed by: OPHTHALMOLOGY

## 2023-10-11 PROCEDURE — 66984 PR REMOVAL, CATARACT, W/INSRT INTRAOC LENS, W/O ENDO CYCLO: ICD-10-PCS | Mod: 79,LT,, | Performed by: OPHTHALMOLOGY

## 2023-10-11 PROCEDURE — 66984 XCAPSL CTRC RMVL W/O ECP: CPT | Mod: 79,LT,, | Performed by: OPHTHALMOLOGY

## 2023-10-11 PROCEDURE — D9220A PRA ANESTHESIA: ICD-10-PCS | Mod: ,,, | Performed by: NURSE ANESTHETIST, CERTIFIED REGISTERED

## 2023-10-11 DEVICE — LENS EYHANCE +20.0D: Type: IMPLANTABLE DEVICE | Site: EYE | Status: FUNCTIONAL

## 2023-10-11 RX ORDER — MOXIFLOXACIN 5 MG/ML
SOLUTION/ DROPS OPHTHALMIC
Status: DISCONTINUED | OUTPATIENT
Start: 2023-10-11 | End: 2023-10-11 | Stop reason: HOSPADM

## 2023-10-11 RX ORDER — MIDAZOLAM HYDROCHLORIDE 1 MG/ML
INJECTION, SOLUTION INTRAMUSCULAR; INTRAVENOUS
Status: DISCONTINUED | OUTPATIENT
Start: 2023-10-11 | End: 2023-10-11

## 2023-10-11 RX ORDER — PHENYLEPHRINE HYDROCHLORIDE 100 MG/ML
1 SOLUTION/ DROPS OPHTHALMIC
Status: DISCONTINUED | OUTPATIENT
Start: 2023-10-11 | End: 2024-03-12

## 2023-10-11 RX ORDER — PROPARACAINE HYDROCHLORIDE 5 MG/ML
1 SOLUTION/ DROPS OPHTHALMIC
Status: DISCONTINUED | OUTPATIENT
Start: 2023-10-11 | End: 2024-03-12

## 2023-10-11 RX ORDER — TETRACAINE HYDROCHLORIDE 5 MG/ML
1 SOLUTION OPHTHALMIC
Status: DISCONTINUED | OUTPATIENT
Start: 2023-10-11 | End: 2024-03-12

## 2023-10-11 RX ORDER — PROPARACAINE HYDROCHLORIDE 5 MG/ML
1 SOLUTION/ DROPS OPHTHALMIC
Status: DISCONTINUED | OUTPATIENT
Start: 2023-10-11 | End: 2023-10-11 | Stop reason: HOSPADM

## 2023-10-11 RX ORDER — MOXIFLOXACIN 5 MG/ML
1 SOLUTION/ DROPS OPHTHALMIC
Status: COMPLETED | OUTPATIENT
Start: 2023-10-11 | End: 2023-10-11

## 2023-10-11 RX ORDER — LIDOCAINE HYDROCHLORIDE 10 MG/ML
INJECTION, SOLUTION EPIDURAL; INFILTRATION; INTRACAUDAL; PERINEURAL
Status: DISCONTINUED | OUTPATIENT
Start: 2023-10-11 | End: 2023-10-11 | Stop reason: HOSPADM

## 2023-10-11 RX ORDER — CYCLOP/TROP/PROPA/PHEN/KET/WAT 1-1-0.1%
1 DROPS (EA) OPHTHALMIC (EYE)
Status: COMPLETED | OUTPATIENT
Start: 2023-10-11 | End: 2023-10-11

## 2023-10-11 RX ORDER — SODIUM CHLORIDE 0.9 % (FLUSH) 0.9 %
2 SYRINGE (ML) INJECTION
Status: DISCONTINUED | OUTPATIENT
Start: 2023-10-11 | End: 2024-03-12

## 2023-10-11 RX ORDER — ACETAMINOPHEN 325 MG/1
650 TABLET ORAL EVERY 4 HOURS PRN
Status: DISCONTINUED | OUTPATIENT
Start: 2023-10-11 | End: 2023-10-11 | Stop reason: HOSPADM

## 2023-10-11 RX ORDER — LIDOCAINE HYDROCHLORIDE 40 MG/ML
INJECTION, SOLUTION RETROBULBAR
Status: DISCONTINUED | OUTPATIENT
Start: 2023-10-11 | End: 2023-10-11 | Stop reason: HOSPADM

## 2023-10-11 RX ORDER — PREDNISOLONE ACETATE 10 MG/ML
SUSPENSION/ DROPS OPHTHALMIC
Status: DISCONTINUED | OUTPATIENT
Start: 2023-10-11 | End: 2023-10-11 | Stop reason: HOSPADM

## 2023-10-11 RX ORDER — TETRACAINE HYDROCHLORIDE 5 MG/ML
SOLUTION OPHTHALMIC
Status: DISCONTINUED | OUTPATIENT
Start: 2023-10-11 | End: 2023-10-11 | Stop reason: HOSPADM

## 2023-10-11 RX ADMIN — Medication 1 DROP: at 09:10

## 2023-10-11 RX ADMIN — MOXIFLOXACIN OPHTHALMIC 1 DROP: 5 SOLUTION/ DROPS OPHTHALMIC at 09:10

## 2023-10-11 RX ADMIN — MOXIFLOXACIN 1 DROP: 5 SOLUTION/ DROPS OPHTHALMIC at 11:10

## 2023-10-11 RX ADMIN — MIDAZOLAM HYDROCHLORIDE 1 MG: 1 INJECTION, SOLUTION INTRAMUSCULAR; INTRAVENOUS at 10:10

## 2023-10-11 NOTE — OP NOTE
DATE OF PROCEDURE: 10/11/2023       SURGEON: LILLIAN SHIRLEY MD    PREOPERATIVE DIAGNOSIS:  Senile nuclear sclerotic cataract left eye.     POSTOPERATIVE DIAGNOSIS: Senile nuclear sclerotic cataract left eye.     PROCEDURE PERFORMED:  Phacoemulsification with placement of intraocular lens, left eye.    IMPLANT:  DIBOO  20.0    ANESTHESIA:  Topical and MAC    COMPLICATIONS: none    ESTIMATED BLOOD LOSS: <1cc    SPECIMENS: none    INDICATIONS FOR PROCEDURE:   The patient has a history of painless progressive vision loss.  The patient has described difficulties with activities of daily living, which specifically include driving, which is secondary to cataract formation and progression. After we had a thorough discussion about risks, benefits, and alternatives to cataract surgery, the patient agreed to proceed with phacoemulsification and implantation of a lens in the left eye.  These risks include, but are not limited to, hemorrhage, pain, infection, need for additional surgery, need for glasses or contacts, loss of vision, or even loss of the eye.    PROCEDURE IN DETAIL:  The patient was met in the preop holding area.  Consent was confirmed to be signed.  The operative site was marked.  The patient was brought into the operating room by the anesthesia team and placed under monitored anesthesia care.  The left eye was prepped and draped in a sterile ophthalmic fashion.  A Radha speculum was placed into the left eye.   A paracentesis site was made and 1% preservative-free lidocaine was injected into the anterior chamber.  Viscoelastic  material was injected into the anterior chamber.  A keratome blade was used to make a clear corneal incision.  A cystotome was used to initiate the continuous curvilinear capsulorrhexis which was completed with Utrata forceps.  BSS on a sage cannula was used to perform hydrodissection.  The phacoemulsification tip was introduced into the eye and the nucleus was removed in a standard  divide-and-conquer fashion.  Remaining cortical material was removed from the eye using irrigation-aspiration.  The capsular bag was filled with viscoelastic material and the intraocular lens was injected and positioned into place. Remaining viscoelastic material was removed from the eye using irrigation and aspiration.  The corneal wounds were hydrated.  The eye was filled to physiologic pressure. The wounds were found to be watertight. Drops of Vigamox and prednisilone were placed into the eye.  The eye was washed, dried, and shielded.  The patient tolerated the procedure well and knows to follow up with me tomorrow morning, sooner if needed.

## 2023-10-11 NOTE — TRANSFER OF CARE
Anesthesia Transfer of Care Note    Patient: Marcie Bazan    Procedure(s) Performed: Procedure(s) (LRB):  EXTRACTION, CATARACT, WITH IOL INSERTION (Left)    Patient location: PACU    Anesthesia Type: MAC    Transport from OR: Transported from OR on room air with adequate spontaneous ventilation    Post pain: adequate analgesia    Post assessment: no apparent anesthetic complications    Post vital signs: stable    Level of consciousness: awake    Nausea/Vomiting: no nausea/vomiting    Complications: none    Transfer of care protocol was followed      Last vitals:   Visit Vitals  BP (!) 143/68 (BP Location: Left arm, Patient Position: Lying)   Pulse 83   Temp 36.2 °C (97.2 °F) (Temporal)   Resp 16   LMP 01/01/1985 (Approximate)   SpO2 95%   Breastfeeding No

## 2023-10-11 NOTE — DISCHARGE INSTRUCTIONS
Dr. Mullen   Cataract Post-Operative Instructions     Day of surgery:   -Resume drops THREE times daily into the operative eye.   -Do not rub your eye   -Wear protective sunglasses during the day.   -Resume moderate activity.   -Bathe/shower/wash face normally   -Do not apply makeup around the operative eye for 1 week.     -You should expect:     Blurry vision and halos for 24-48 hours   Dilated pupil for 24-48 hours   Scratchy feeling in the eye for 1-2 days   Curved shadow in your peripheral vision for 2-3 weeks   Occasional flickering of lights for up to 1 week     -If you experience severe pain or nausea, call Dr. Mullen or the on-call doctor at [phone].     Plan to see Dr. Mullen tomorrow at:   [facility] [address]   10TH FLOOR   [facility] [address]     **Most patients can drive the next morning.  If you do not feel comfortable driving, please arrange for transportation. **  Dr. Mullen   Cataract Post-Operative Instructions

## 2023-10-11 NOTE — TELEPHONE ENCOUNTER
received below communication from Liver transplant physician copied below of labs forwarded to her by another providers labs showing an elevation in LFT.   has sent patient My chart notification to patient, of physician requests,  and that the labs and ultrasound have been scheduled on 10/23, apt letters being mailed out.        Joselin Wallace MD   10/6/2023  5:36 PM CDT     Liver tests are up, proceed with transplant US and repeat labs with GGT in 2 weeks

## 2023-10-11 NOTE — ANESTHESIA PREPROCEDURE EVALUATION
10/11/2023  Marcie Bazan is a 58 y.o., female.      Pre-op Assessment     I have reviewed the Nursing Notes.    I have reviewed the Medications.     Review of Systems  Anesthesia Hx:  No problems with previous Anesthesia  Denies Family Hx of Anesthesia complications.    Social:  Non-Smoker, No Alcohol Use    Hematology/Oncology:  Hematology Normal   Oncology Normal     EENT/Dental:EENT/Dental Normal   Cardiovascular:   Exercise tolerance: good Hypertension    Pulmonary:   Shortness of breath Sleep Apnea    Renal/:  Renal/ Normal     Hepatic/GI:   PUD, Liver Disease, Hepatitis    Musculoskeletal:   Arthritis     Neurological:   Neuromuscular Disease, Headaches    Endocrine:  Endocrine Normal    Psych:   Psychiatric History          Physical Exam  General: Well nourished    Airway:  Mallampati: II / II  Mouth Opening: Normal  TM Distance: Normal  Tongue: Normal  Neck ROM: Normal ROM    Dental:  Intact        Anesthesia Plan  Type of Anesthesia, risks & benefits discussed:    Anesthesia Type: MAC  Intra-op Monitoring Plan: Standard ASA Monitors  Post Op Pain Control Plan: multimodal analgesia  Informed Consent: Informed consent signed with the Patient and all parties understand the risks and agree with anesthesia plan.  All questions answered.   ASA Score: 2    Ready For Surgery From Anesthesia Perspective.     .

## 2023-10-11 NOTE — PLAN OF CARE
Discharge instructions given to patient/ daughter and both  verbalized understanding of all.  VSS, denies n/v and tolerating PO, rates pain level tolerable. IV removed, and family notified for patient discharge home.

## 2023-10-11 NOTE — DISCHARGE SUMMARY
[facility]  Discharge Note  Short Stay    Procedure(s) (LRB):  EXTRACTION, CATARACT, WITH IOL INSERTION (Left)    BRIEF DISCHARGE NOTE:    Date of discharge: 10/11/2023    Reason for hospitalization -  Cataract surgery     Final Diagnosis - Visually significant Cataract    Procedures and treatment provided - Status post phacoemulsification with placement of intraocular lens     Diet - Advance to regular as tolerated    Activity - as tolerated    Disposition at the end of the case - Good.    Discharge: to home    The patient tolerated the procedure well and knows to follow up with me tomorrow morning in the eye clinic, sooner if needed.    Patient and family instructions (as appropriate) - Given to patient on discharge    Kim Mullen MD

## 2023-10-11 NOTE — ANESTHESIA POSTPROCEDURE EVALUATION
Anesthesia Post Evaluation    Patient: Marcie Bazan    Procedure(s) Performed: Procedure(s) (LRB):  EXTRACTION, CATARACT, WITH IOL INSERTION (Left)    Final Anesthesia Type: MAC      Patient location during evaluation: [facility]  Patient participation: Yes- Able to Participate  Level of consciousness: awake and alert  Post-procedure vital signs: reviewed and stable  Pain management: adequate  Airway patency: patent    PONV status at discharge: No PONV  Anesthetic complications: no      Cardiovascular status: blood pressure returned to baseline and hemodynamically stable  Respiratory status: unassisted  Hydration status: euvolemic  Follow-up not needed.          Vitals Value Taken Time   /72 10/11/23 1123   Temp 36.2 °C (97.2 °F) 10/11/23 1053   Pulse 80 10/11/23 1125   Resp 17 10/11/23 1123   SpO2 95 % 10/11/23 1125   Vitals shown include unvalidated device data.      No case tracking events are documented in the log.      Pain/Larry Score: Larry Score: 10 (10/11/2023 11:23 AM)

## 2023-10-12 ENCOUNTER — TELEPHONE (OUTPATIENT)
Dept: OPHTHALMOLOGY | Facility: CLINIC | Age: 58
End: 2023-10-12
Payer: MEDICAID

## 2023-10-12 NOTE — TELEPHONE ENCOUNTER
Called to f/u with patient.  She won't be able to come in due to she lives in [place]. Pt. States vision is clear.  Pt. Denies pain or discomfort. I advise her to keep her following appointment.

## 2023-10-13 ENCOUNTER — TELEPHONE (OUTPATIENT)
Dept: OPHTHALMOLOGY | Facility: CLINIC | Age: 58
End: 2023-10-13
Payer: MEDICAID

## 2023-10-18 ENCOUNTER — PATIENT MESSAGE (OUTPATIENT)
Dept: INTERNAL MEDICINE | Facility: CLINIC | Age: 58
End: 2023-10-18
Payer: MEDICAID

## 2023-10-19 ENCOUNTER — PATIENT MESSAGE (OUTPATIENT)
Dept: PAIN MEDICINE | Facility: CLINIC | Age: 58
End: 2023-10-19
Payer: MEDICAID

## 2023-10-19 ENCOUNTER — TELEPHONE (OUTPATIENT)
Dept: INTERNAL MEDICINE | Facility: CLINIC | Age: 58
End: 2023-10-19
Payer: MEDICAID

## 2023-10-19 ENCOUNTER — OFFICE VISIT (OUTPATIENT)
Dept: OPHTHALMOLOGY | Facility: CLINIC | Age: 58
End: 2023-10-19
Payer: MEDICAID

## 2023-10-19 DIAGNOSIS — Z96.1 STATUS POST CATARACT EXTRACTION AND INSERTION OF INTRAOCULAR LENS, LEFT: Primary | ICD-10-CM

## 2023-10-19 DIAGNOSIS — Z96.1 STATUS POST CATARACT EXTRACTION AND INSERTION OF INTRAOCULAR LENS, RIGHT: ICD-10-CM

## 2023-10-19 DIAGNOSIS — Z98.42 STATUS POST CATARACT EXTRACTION AND INSERTION OF INTRAOCULAR LENS, LEFT: Primary | ICD-10-CM

## 2023-10-19 DIAGNOSIS — Z98.41 STATUS POST CATARACT EXTRACTION AND INSERTION OF INTRAOCULAR LENS, RIGHT: ICD-10-CM

## 2023-10-19 PROCEDURE — 99211 OFF/OP EST MAY X REQ PHY/QHP: CPT | Mod: PBBFAC | Performed by: OPHTHALMOLOGY

## 2023-10-19 PROCEDURE — 4010F ACE/ARB THERAPY RXD/TAKEN: CPT | Mod: CPTII,,, | Performed by: OPHTHALMOLOGY

## 2023-10-19 PROCEDURE — 99024 PR POST-OP FOLLOW-UP VISIT: ICD-10-PCS | Mod: ,,, | Performed by: OPHTHALMOLOGY

## 2023-10-19 PROCEDURE — 99024 POSTOP FOLLOW-UP VISIT: CPT | Mod: ,,, | Performed by: OPHTHALMOLOGY

## 2023-10-19 PROCEDURE — 99999 PR PBB SHADOW E&M-EST. PATIENT-LVL I: CPT | Mod: PBBFAC,,, | Performed by: OPHTHALMOLOGY

## 2023-10-19 PROCEDURE — 3044F PR MOST RECENT HEMOGLOBIN A1C LEVEL <7.0%: ICD-10-PCS | Mod: CPTII,,, | Performed by: OPHTHALMOLOGY

## 2023-10-19 PROCEDURE — 99999 PR PBB SHADOW E&M-EST. PATIENT-LVL I: ICD-10-PCS | Mod: PBBFAC,,, | Performed by: OPHTHALMOLOGY

## 2023-10-19 PROCEDURE — 3044F HG A1C LEVEL LT 7.0%: CPT | Mod: CPTII,,, | Performed by: OPHTHALMOLOGY

## 2023-10-19 PROCEDURE — 4010F PR ACE/ARB THEARPY RXD/TAKEN: ICD-10-PCS | Mod: CPTII,,, | Performed by: OPHTHALMOLOGY

## 2023-10-19 NOTE — TELEPHONE ENCOUNTER
----- Message from ETHAN Munoz MD sent at 10/19/2023  8:40 AM CDT -----  Contact: self  Her symptoms require an office visit to evaluate. She can schedule first available with [facility] or go to urgent care.  The FMLA is a separate issue. Offer her with a virtual visit with me to address the FMLA.  Thanks.  ----- Message -----  From: Meron Metcalf MA  Sent: 10/18/2023   2:32 PM CDT  To: ETHAN Munoz MD    Please advise  ----- Message -----  From: Calvin Shi  Sent: 10/18/2023   9:54 AM CDT  To: Ifeanyi YEUNG Staff (Int Med)    Pt is asking for an return call in reference to seeing next steps for pain she is having , coughing and choking and is having difficulties doing for herself. Also states she is needing FMLA paper work filled out for her daughter please call back at [phone] Thx CJ

## 2023-10-20 ENCOUNTER — TELEPHONE (OUTPATIENT)
Dept: PAIN MEDICINE | Facility: CLINIC | Age: 58
End: 2023-10-20
Payer: MEDICAID

## 2023-10-20 ENCOUNTER — TELEPHONE (OUTPATIENT)
Dept: NEUROSURGERY | Facility: CLINIC | Age: 58
End: 2023-10-20

## 2023-10-20 ENCOUNTER — PATIENT MESSAGE (OUTPATIENT)
Dept: PAIN MEDICINE | Facility: CLINIC | Age: 58
End: 2023-10-20
Payer: MEDICAID

## 2023-10-20 NOTE — TELEPHONE ENCOUNTER
----- Message from Alonso Muñoz sent at 10/20/2023  8:17 AM CDT -----  Contact: [phone]  Patient is requesting a call in regards to a letter that needs to be sent over to her . Please call her back at [phone]. Thanks KB

## 2023-10-23 ENCOUNTER — HOSPITAL ENCOUNTER (OUTPATIENT)
Dept: RADIOLOGY | Facility: HOSPITAL | Age: 58
Discharge: HOME | End: 2023-10-23
Attending: INTERNAL MEDICINE
Payer: MEDICAID

## 2023-10-23 ENCOUNTER — TELEPHONE (OUTPATIENT)
Dept: INTERNAL MEDICINE | Facility: CLINIC | Age: 58
End: 2023-10-23
Payer: MEDICAID

## 2023-10-23 DIAGNOSIS — Z94.4 LIVER REPLACED BY TRANSPLANT: ICD-10-CM

## 2023-10-23 DIAGNOSIS — R79.89 ELEVATED LIVER FUNCTION TESTS: ICD-10-CM

## 2023-10-23 PROCEDURE — 76705 ECHO EXAM OF ABDOMEN: CPT | Mod: TC

## 2023-10-23 PROCEDURE — 93976 VASCULAR STUDY: CPT | Mod: TC

## 2023-10-23 PROCEDURE — 76705 ECHO EXAM OF ABDOMEN: CPT | Mod: 26,59,, | Performed by: RADIOLOGY

## 2023-10-23 PROCEDURE — 93976 VASCULAR STUDY: CPT | Mod: 26,,, | Performed by: RADIOLOGY

## 2023-10-23 PROCEDURE — 93976 US DOPPLER LIVER TRANSPLANT POST (XPD): ICD-10-PCS | Mod: 26,,, | Performed by: RADIOLOGY

## 2023-10-23 PROCEDURE — 76705 US DOPPLER LIVER TRANSPLANT POST (XPD): ICD-10-PCS | Mod: 26,59,, | Performed by: RADIOLOGY

## 2023-10-23 NOTE — TELEPHONE ENCOUNTER
----- Message from Elizabeth Coronado MA sent at 10/23/2023 11:32 AM CDT -----  Contact: [phone]  Patient called              In regards to speak back with staff about today's appt with provider. Pt stated that she wont make that time and needs to rescheduled.              Call back [phone]

## 2023-10-23 NOTE — TELEPHONE ENCOUNTER
----- Message from Karlie Amador sent at 10/23/2023 10:22 AM CDT -----  Contact: Marcie Jack is calling to speak to the nurse regarding her appointment, she said she would call right back, please give her a call at [phone]    Thanks  LJ

## 2023-10-25 NOTE — PROGRESS NOTES
HPI    NP Ref Dr. Aldana OD ([place])     Irregular Astigmatism vs Early Keratoconus  S/p Phaco IOL OD - 9/21/23  S/p Phaco IOL OS-10/11/23    Meds: PF TID OD            Ocuflux TID OD             Ketorolac TID OD   Pt here for one week post op OS   Last edited by Radha Recio on 10/19/2023  4:08 PM.            Assessment /Plan     For exam results, see Encounter Report.    Status post cataract extraction and insertion of intraocular lens, left    Status post cataract extraction and insertion of intraocular lens, right      Slit Lamp Exam  L/L - normal  C/s - quiet  Cornea - clear  A/C - 1+ cell  Lens - PCIOL    POD #1 s/p phaco/IOL  - doing well  - continue the following drops:    vigamox or ocuflox TID x 1 wk then stop  Pred forte TID x  4 wks  Ketorolac TID until runs out    Versus:    Combination drop - 1 drop TID x total of 1 month    Appropriate precautions and post op medications reviewed.  Patient instructed to call or come in if symptoms of redness, decreased vision, or pain are experienced.    -f/up 1-2 wks, sooner PRN. Or 4 wks with optom for mrx if needed.

## 2023-10-26 ENCOUNTER — OFFICE VISIT (OUTPATIENT)
Dept: INTERNAL MEDICINE | Facility: CLINIC | Age: 58
End: 2023-10-26
Payer: MEDICAID

## 2023-10-26 VITALS
SYSTOLIC BLOOD PRESSURE: 125 MMHG | OXYGEN SATURATION: 97 % | HEIGHT: 63 IN | HEART RATE: 93 BPM | BODY MASS INDEX: 38.59 KG/M2 | WEIGHT: 217.81 LBS | DIASTOLIC BLOOD PRESSURE: 78 MMHG | TEMPERATURE: 98 F

## 2023-10-26 DIAGNOSIS — R13.10 DYSPHAGIA, UNSPECIFIED TYPE: ICD-10-CM

## 2023-10-26 DIAGNOSIS — T17.308A CHOKING, INITIAL ENCOUNTER: ICD-10-CM

## 2023-10-26 DIAGNOSIS — I10 ESSENTIAL HYPERTENSION: Chronic | ICD-10-CM

## 2023-10-26 DIAGNOSIS — M47.816 SPONDYLOSIS OF LUMBAR REGION WITHOUT MYELOPATHY OR RADICULOPATHY: ICD-10-CM

## 2023-10-26 DIAGNOSIS — Z94.4 LIVER REPLACED BY TRANSPLANT: ICD-10-CM

## 2023-10-26 DIAGNOSIS — M53.3 SACROILIAC JOINT DYSFUNCTION: ICD-10-CM

## 2023-10-26 DIAGNOSIS — G47.33 OSA ON CPAP: ICD-10-CM

## 2023-10-26 DIAGNOSIS — E05.90 HYPERTHYROIDISM: Primary | ICD-10-CM

## 2023-10-26 PROCEDURE — 3008F BODY MASS INDEX DOCD: CPT | Mod: CPTII,,, | Performed by: PHYSICIAN ASSISTANT

## 2023-10-26 PROCEDURE — 99999 PR PBB SHADOW E&M-EST. PATIENT-LVL V: ICD-10-PCS | Mod: PBBFAC,,, | Performed by: PHYSICIAN ASSISTANT

## 2023-10-26 PROCEDURE — 99213 OFFICE O/P EST LOW 20 MIN: CPT | Mod: S$PBB,,, | Performed by: PHYSICIAN ASSISTANT

## 2023-10-26 PROCEDURE — 3078F DIAST BP <80 MM HG: CPT | Mod: CPTII,,, | Performed by: PHYSICIAN ASSISTANT

## 2023-10-26 PROCEDURE — 4010F PR ACE/ARB THEARPY RXD/TAKEN: ICD-10-PCS | Mod: CPTII,,, | Performed by: PHYSICIAN ASSISTANT

## 2023-10-26 PROCEDURE — 99213 PR OFFICE/OUTPT VISIT, EST, LEVL III, 20-29 MIN: ICD-10-PCS | Mod: S$PBB,,, | Performed by: PHYSICIAN ASSISTANT

## 2023-10-26 PROCEDURE — 3074F PR MOST RECENT SYSTOLIC BLOOD PRESSURE < 130 MM HG: ICD-10-PCS | Mod: CPTII,,, | Performed by: PHYSICIAN ASSISTANT

## 2023-10-26 PROCEDURE — 3008F PR BODY MASS INDEX (BMI) DOCUMENTED: ICD-10-PCS | Mod: CPTII,,, | Performed by: PHYSICIAN ASSISTANT

## 2023-10-26 PROCEDURE — 3074F SYST BP LT 130 MM HG: CPT | Mod: CPTII,,, | Performed by: PHYSICIAN ASSISTANT

## 2023-10-26 PROCEDURE — 3044F HG A1C LEVEL LT 7.0%: CPT | Mod: CPTII,,, | Performed by: PHYSICIAN ASSISTANT

## 2023-10-26 PROCEDURE — 3044F PR MOST RECENT HEMOGLOBIN A1C LEVEL <7.0%: ICD-10-PCS | Mod: CPTII,,, | Performed by: PHYSICIAN ASSISTANT

## 2023-10-26 PROCEDURE — 3078F PR MOST RECENT DIASTOLIC BLOOD PRESSURE < 80 MM HG: ICD-10-PCS | Mod: CPTII,,, | Performed by: PHYSICIAN ASSISTANT

## 2023-10-26 PROCEDURE — 1159F MED LIST DOCD IN RCRD: CPT | Mod: CPTII,,, | Performed by: PHYSICIAN ASSISTANT

## 2023-10-26 PROCEDURE — 4010F ACE/ARB THERAPY RXD/TAKEN: CPT | Mod: CPTII,,, | Performed by: PHYSICIAN ASSISTANT

## 2023-10-26 PROCEDURE — 99215 OFFICE O/P EST HI 40 MIN: CPT | Mod: PBBFAC | Performed by: PHYSICIAN ASSISTANT

## 2023-10-26 PROCEDURE — 99999 PR PBB SHADOW E&M-EST. PATIENT-LVL V: CPT | Mod: PBBFAC,,, | Performed by: PHYSICIAN ASSISTANT

## 2023-10-26 PROCEDURE — 1159F PR MEDICATION LIST DOCUMENTED IN MEDICAL RECORD: ICD-10-PCS | Mod: CPTII,,, | Performed by: PHYSICIAN ASSISTANT

## 2023-10-26 NOTE — TELEPHONE ENCOUNTER
Patient made aware of Liver US review and lab review. Patient made aware of tacro dose increase from 2/1 to 2/2 with repeat labs 11/2.        Received: Yesterday  Joselin Wallace MD  [facility] Post-Liver Transplant Clinical  Liver tests increasing unclear why.  Increase tacrolimus to 2 mg twice a day and repeat labs next week             ----- Message from Joselin Wallace MD sent at 10/25/2023  8:02 PM CDT -----  No evidence of biliary issue to explain alkaline phosphatase elevation.  Past seems to be okay.

## 2023-10-26 NOTE — PROGRESS NOTES
Subjective:      Patient ID: Marcie Bazan is a 58 y.o. female.    Chief Complaint: Follow-up    HPI  Pt here today to have FMLA paperwork filled out for her daughter. Needs [employer] paperwork filled out for intermittent leave for her daughter to take Ms. Bazan to her dr brown.  Thyroid labs off on recent blood work. She is on biotin daily. Will have her stop the biotin today and we will recheck the thyroid labs on Monday.   Still difficulty swallowing and choking. Doesn't even have to be eating and starts to choke. Had MBS study and it was recommended that she follow up with an ENT. Pt has not been able to get an appointment with ENT (medicaid).     Patient Active Problem List   Diagnosis    Allergic rhinitis    Anemia of chronic disease    Erosive esophagitis    Essential hypertension    History of abnormal cervical Pap smear    History of Helicobacter pylori infection    Lipoma    Multinodular thyroid    Diuretic-induced hypokalemia    Hypomagnesemia    Thrombocytopenia    Liver replaced by transplant    Immunosuppression    At risk for opportunistic infections    Long-term use of immunosuppressant medication    Vitamin D deficiency    Mixed hyperlipidemia    Adhesive capsulitis of both shoulders    Piriformis syndrome of both sides    Spondylosis of lumbar region without myelopathy or radiculopathy    Class 2 severe obesity due to excess calories with serious comorbidity and body mass index (BMI) of 36.0 to 36.9 in adult    Sacroiliac joint dysfunction    Acute stress reaction    Hypersomnia    BRANNON on CPAP    Gastroesophageal reflux disease with esophagitis without hemorrhage    Piriformis muscle pain    Long term current use of loop diuretic    Iron deficiency anemia    Iron deficiency anemia due to chronic blood loss    Decreased strength, endurance, and mobility    Knee pain, bilateral    Choking    Decreased hearing of both ears    Occipital neuralgia of left side    COVID    Chronic constipation    Chronic  nausea    Prediabetes         Current Outpatient Medications:     amitriptyline (ELAVIL) 25 MG tablet, TAKE 1 TO 2 TABLETS(25 TO 50 MG) BY MOUTH EVERY NIGHT AS NEEDED FOR INSOMNIA OR PAIN, Disp: 60 tablet, Rfl: 0    atorvastatin (LIPITOR) 40 MG tablet, Take 1 tablet (40 mg total) by mouth every evening., Disp: 90 tablet, Rfl: 2    DULoxetine (CYMBALTA) 30 MG capsule, Take 1 capsule (30 mg total) by mouth once daily., Disp: 30 capsule, Rfl: 2    ergocalciferol (VITAMIN D2) 50,000 unit Cap, Take 50,000 Units by mouth every 7 days. Sunday, Disp: , Rfl:     famotidine (PEPCID) 20 MG tablet, Take 1 tablet (20 mg total) by mouth 2 (two) times a day., Disp: 180 tablet, Rfl: 3    folic acid (FOLVITE) 1 MG tablet, Take 1 tablet (1 mg total) by mouth once daily., Disp: 90 tablet, Rfl: 3    furosemide (LASIX) 40 MG tablet, TAKE 1 TABLET BY MOUTH EVERY DAY, Disp: 90 tablet, Rfl: 5    ketorolac 0.5% (ACULAR) 0.5 % Drop, Place 1 drop into the right eye 3 (three) times daily., Disp: 5 mL, Rfl: 3    ketorolac 0.5% (ACULAR) 0.5 % Drop, Place 1 drop into the left eye 3 (three) times daily., Disp: 5 mL, Rfl: 3    levocetirizine (XYZAL) 5 MG tablet, Take 1 tablet (5 mg total) by mouth every evening., Disp: 30 tablet, Rfl: 11    losartan (COZAAR) 25 MG tablet, Take 1 tablet (25 mg total) by mouth once daily., Disp: 90 tablet, Rfl: 3    magnesium oxide (MAG-OX) 400 mg (241.3 mg magnesium) tablet, TAKE 1 TABLET(400 MG) BY MOUTH TWICE DAILY, Disp: 60 tablet, Rfl: 2    natrexone tablet 4.5 mg, Take 1 tablet (4.5 mg total) by mouth every evening., Disp: 30 tablet, Rfl: 2    NIFEdipine (PROCARDIA-XL) 60 MG (OSM) 24 hr tablet, TAKE 1 TABLET(60 MG) BY MOUTH EVERY EVENING, Disp: 90 tablet, Rfl: 3    ofloxacin (OCUFLOX) 0.3 % ophthalmic solution, Place 1 drop into the right eye 3 (three) times daily., Disp: 5 mL, Rfl: 3    ofloxacin (OCUFLOX) 0.3 % ophthalmic solution, Place 1 drop into the left eye 3 (three) times daily., Disp: 5 mL, Rfl: 3     ondansetron (ZOFRAN) 8 MG tablet, Take 1 tablet (8 mg total) by mouth every 12 (twelve) hours as needed for Nausea., Disp: 60 tablet, Rfl: 5    ondansetron (ZOFRAN-ODT) 4 MG TbDL, ondansetron Take 1 Tablet (oral) 3 times per day PRN - Nausea [id number] tablet,disintegrating 3 times per day oral No set duration recorded No set duration amount recorded active 4 mg, Disp: , Rfl:     pantoprazole (PROTONIX) 40 MG tablet, Take 1 tablet (40 mg total) by mouth every morning., Disp: 90 tablet, Rfl: 3    potassium chloride (KLOR-CON) 10 MEQ TbSR, Take 1 tablet (10 mEq total) by mouth 2 (two) times daily., Disp: 180 tablet, Rfl: 1    prednisolon/gatiflox/bromfenac (PREDNISOL ACE-GATIFLOX-BROMFEN) 1-0.5-0.075 % DrpS, Apply 1 drop to eye 3 (three) times daily., Disp: 5 mL, Rfl: 3    prednisoLONE acetate (PRED FORTE) 1 % DrpS, Place 1 drop into the right eye 3 (three) times daily., Disp: 5 mL, Rfl: 3    prednisoLONE acetate (PRED FORTE) 1 % DrpS, Place 1 drop into the left eye 3 (three) times daily., Disp: 5 mL, Rfl: 3    pregabalin (LYRICA) 225 MG Cap, TAKE 1 CAPSULE(225 MG) BY MOUTH TWICE DAILY Strength: 225 mg, Disp: 60 capsule, Rfl: 1    tacrolimus (PROGRAF) 1 MG Cap, TAKE 2 CAPSULES BY MOUTH EVERY MORNING AND 1 CAPSULE EVERY EVENING, Disp: 90 capsule, Rfl: 2    traMADoL (ULTRAM) 50 mg tablet, TAKE 1/2 TO 1 TABLET(25 TO 50 MG) BY MOUTH EVERY 8 HOURS AS NEEDED FOR PAIN Strength: 50 mg, Disp: 21 tablet, Rfl: 0  No current facility-administered medications for this visit.    Facility-Administered Medications Ordered in Other Visits:     balanced salt irrigation intra-ocular solution 1 drop, 1 drop, Left Eye, On Call Procedure, Kim Mullen MD    ondansetron injection 4 mg, 4 mg, Intravenous, Once PRN, Rsa Caballero MD    ondansetron injection 4 mg, 4 mg, Intravenous, Once PRN, Ras Caballero MD    phenylephrine HCL 10% ophthalmic solution 1 drop, 1 drop, Left Eye, PRN, Kim Mullen MD    proparacaine 0.5 %  "ophthalmic solution 1 drop, 1 drop, Left Eye, PRN, Kim Mullen MD    sodium chloride 0.9% flush 2 mL, 2 mL, Intravenous, Sebas ELAINE Ginny L., MD    TETRAcaine HCl (PF) 0.5 % Drop 1 drop, 1 drop, Left Eye, Sebas ELAINE Ginny L., MD    Review of Systems   Constitutional:  Negative for activity change, appetite change, chills, diaphoresis, fatigue, fever and unexpected weight change.   HENT:  Positive for trouble swallowing. Negative for congestion, hearing loss, postnasal drip, rhinorrhea, sore throat and voice change.    Eyes: Negative.  Negative for visual disturbance.   Respiratory: Negative.  Negative for cough, choking, chest tightness and shortness of breath.    Cardiovascular:  Negative for chest pain, palpitations and leg swelling.   Gastrointestinal:  Negative for abdominal distention, abdominal pain, blood in stool, constipation, diarrhea, nausea and vomiting.   Endocrine: Negative for cold intolerance, heat intolerance, polydipsia and polyuria.   Genitourinary: Negative.  Negative for difficulty urinating and frequency.   Musculoskeletal:  Positive for arthralgias, back pain and gait problem. Negative for joint swelling, myalgias and neck stiffness.   Skin:  Negative for color change, pallor, rash and wound.   Neurological:  Negative for dizziness, tremors, weakness, light-headedness, numbness and headaches.   Hematological:  Negative for adenopathy.   Psychiatric/Behavioral:  Negative for behavioral problems, confusion, self-injury, sleep disturbance and suicidal ideas. The patient is not nervous/anxious.      Objective:   /78 (BP Location: Left arm, Patient Position: Sitting, BP Method: Large (Manual))   Pulse 93   Temp 97.9 °F (36.6 °C) (Tympanic)   Ht 5' 2.99" (1.6 m)   Wt 98.8 kg (217 lb 13 oz)   LMP 01/01/1985 (Approximate) Comment: 1985  SpO2 97%   BMI 38.59 kg/m²     Physical Exam  Constitutional:       General: She is not in acute distress.     Appearance: Normal appearance. " She is not ill-appearing, toxic-appearing or diaphoretic.   HENT:      Head: Normocephalic and atraumatic.   Pulmonary:      Effort: Pulmonary effort is normal. No respiratory distress.      Breath sounds: Normal breath sounds.   Skin:     Coloration: Skin is not pale.      Findings: No bruising, erythema or rash.   Neurological:      Mental Status: She is alert and oriented to person, place, and time.   Psychiatric:         Mood and Affect: Mood normal.         Behavior: Behavior normal.         Thought Content: Thought content normal.         Judgment: Judgment normal.       Narrative & Impression  EXAMINATION:  US SOFT TISSUE HEAD NECK THYROID     CLINICAL HISTORY:  Nontoxic multinodular goiter     TECHNIQUE:  Ultrasound of the thyroid was performed.     COMPARISON:  12/29/2021     FINDINGS:  Right thyroid lobe 4.6 x 1.2 x 1.7 cm.  Left thyroid lobe 3.5 x 1.2 x 1.3 cm.  Isthmus 0.3 cm.  Right thyroid cyst 7 mm.  Isoechoic right thyroid nodule 1.9 cm in length.  Isoechoic right thyroid nodule 8 mm.  Left thyroid lobe isoechoic nodule 1.9 cm.  Left thyroid lobe isoechoic nodule 1.5 cm.     Impression:     Multiple TR 3 nodules bilaterally measuring up to 2 cm on the right and 1.9 cm on the left.  Multiple nodules demonstrate a few mm of increased size compared to prior exams.  This could represent benign normally expected benign nodular growth over time.  No other significant interval change.  Continued surveillance recommended.        Electronically signed by: Maria Elena Shi MD  Date:                                            04/17/2023  Time:                                           11:26           Exam Ended: 04/17/23 11:15 Last Resulted: 04/17/23 11:26                Lab Results   Component Value Date    TSH <0.010 (L) 10/06/2023     Lab Results   Component Value Date    TSH <0.010 (L) 10/06/2023    FREET4 1.05 10/06/2023       Assessment:     1. Hyperthyroidism    2. Dysphagia, unspecified type    3.  Choking, initial encounter    4. BRANNON on CPAP    5. Sacroiliac joint dysfunction    6. Spondylosis of lumbar region without myelopathy or radiculopathy    7. Essential hypertension      Plan:   Hyperthyroidism  -     Ambulatory referral/consult to ENT    Dysphagia, unspecified type  -     Ambulatory referral/consult to ENT    Choking, initial encounter    BRANNON on CPAP    Sacroiliac joint dysfunction    Spondylosis of lumbar region without myelopathy or radiculopathy    Essential hypertension    -will refer to [facility] per speech pathology recommendation for further eval of her choking/dysphagia.   -FMLA papers filled out today for the patient.     Follow up if symptoms worsen or fail to improve.

## 2023-10-27 RX ORDER — TACROLIMUS 1 MG/1
2 CAPSULE ORAL EVERY 12 HOURS
Qty: 120 CAPSULE | Refills: 11 | Status: SHIPPED | OUTPATIENT
Start: 2023-10-27 | End: 2024-10-27

## 2023-11-02 ENCOUNTER — LAB VISIT (OUTPATIENT)
Dept: LAB | Facility: HOSPITAL | Age: 58
End: 2023-11-02
Attending: INTERNAL MEDICINE
Payer: MEDICAID

## 2023-11-02 ENCOUNTER — TELEPHONE (OUTPATIENT)
Dept: TRANSPLANT | Facility: CLINIC | Age: 58
End: 2023-11-02
Payer: MEDICAID

## 2023-11-02 DIAGNOSIS — Z94.4 LIVER REPLACED BY TRANSPLANT: ICD-10-CM

## 2023-11-02 DIAGNOSIS — M54.16 RIGHT LUMBAR RADICULOPATHY: ICD-10-CM

## 2023-11-02 DIAGNOSIS — T50.2X5A DIURETIC-INDUCED HYPOKALEMIA: ICD-10-CM

## 2023-11-02 DIAGNOSIS — E87.6 DIURETIC-INDUCED HYPOKALEMIA: ICD-10-CM

## 2023-11-02 LAB
ALBUMIN SERPL BCP-MCNC: 3.5 G/DL (ref 3.5–5.2)
ALP SERPL-CCNC: 201 U/L (ref 55–135)
ALT SERPL W/O P-5'-P-CCNC: 10 U/L (ref 10–44)
ANION GAP SERPL CALC-SCNC: 11 MMOL/L (ref 8–16)
AST SERPL-CCNC: 10 U/L (ref 10–40)
BASOPHILS # BLD AUTO: 0.05 K/UL (ref 0–0.2)
BASOPHILS NFR BLD: 0.5 % (ref 0–1.9)
BILIRUB SERPL-MCNC: 0.5 MG/DL (ref 0.1–1)
BUN SERPL-MCNC: 8 MG/DL (ref 6–20)
CALCIUM SERPL-MCNC: 9.5 MG/DL (ref 8.7–10.5)
CHLORIDE SERPL-SCNC: 101 MMOL/L (ref 95–110)
CO2 SERPL-SCNC: 26 MMOL/L (ref 23–29)
CREAT SERPL-MCNC: 0.9 MG/DL (ref 0.5–1.4)
DIFFERENTIAL METHOD: ABNORMAL
EOSINOPHIL # BLD AUTO: 0.2 K/UL (ref 0–0.5)
EOSINOPHIL NFR BLD: 2 % (ref 0–8)
ERYTHROCYTE [DISTWIDTH] IN BLOOD BY AUTOMATED COUNT: 15.3 % (ref 11.5–14.5)
EST. GFR  (NO RACE VARIABLE): >60 ML/MIN/1.73 M^2
GGT SERPL-CCNC: 76 U/L (ref 8–55)
GLUCOSE SERPL-MCNC: 106 MG/DL (ref 70–110)
HCT VFR BLD AUTO: 42.6 % (ref 37–48.5)
HGB BLD-MCNC: 13.3 G/DL (ref 12–16)
IMM GRANULOCYTES # BLD AUTO: 0.03 K/UL (ref 0–0.04)
IMM GRANULOCYTES NFR BLD AUTO: 0.3 % (ref 0–0.5)
LYMPHOCYTES # BLD AUTO: 2 K/UL (ref 1–4.8)
LYMPHOCYTES NFR BLD: 21.7 % (ref 18–48)
MCH RBC QN AUTO: 25.3 PG (ref 27–31)
MCHC RBC AUTO-ENTMCNC: 31.2 G/DL (ref 32–36)
MCV RBC AUTO: 81 FL (ref 82–98)
MONOCYTES # BLD AUTO: 0.6 K/UL (ref 0.3–1)
MONOCYTES NFR BLD: 6.4 % (ref 4–15)
NEUTROPHILS # BLD AUTO: 6.3 K/UL (ref 1.8–7.7)
NEUTROPHILS NFR BLD: 69.1 % (ref 38–73)
NRBC BLD-RTO: 0 /100 WBC
PLATELET # BLD AUTO: 144 K/UL (ref 150–450)
PMV BLD AUTO: 11.6 FL (ref 9.2–12.9)
POTASSIUM SERPL-SCNC: 3.4 MMOL/L (ref 3.5–5.1)
PROT SERPL-MCNC: 7.5 G/DL (ref 6–8.4)
RBC # BLD AUTO: 5.26 M/UL (ref 4–5.4)
SODIUM SERPL-SCNC: 138 MMOL/L (ref 136–145)
WBC # BLD AUTO: 9.13 K/UL (ref 3.9–12.7)

## 2023-11-02 PROCEDURE — 36415 COLL VENOUS BLD VENIPUNCTURE: CPT | Performed by: INTERNAL MEDICINE

## 2023-11-02 PROCEDURE — 85025 COMPLETE CBC W/AUTO DIFF WBC: CPT | Performed by: INTERNAL MEDICINE

## 2023-11-02 PROCEDURE — 82977 ASSAY OF GGT: CPT | Performed by: INTERNAL MEDICINE

## 2023-11-02 PROCEDURE — 80197 ASSAY OF TACROLIMUS: CPT | Performed by: INTERNAL MEDICINE

## 2023-11-02 PROCEDURE — 80053 COMPREHEN METABOLIC PANEL: CPT | Performed by: INTERNAL MEDICINE

## 2023-11-02 NOTE — TELEPHONE ENCOUNTER
Refill request routed to [facility] Review Pool for Pharmacist Review.

## 2023-11-03 LAB — TACROLIMUS BLD-MCNC: 8 NG/ML (ref 5–15)

## 2023-11-03 RX ORDER — TRAMADOL HYDROCHLORIDE 50 MG/1
TABLET ORAL
Qty: 21 TABLET | Refills: 0 | Status: SHIPPED | OUTPATIENT
Start: 2023-11-03 | End: 2023-12-12 | Stop reason: SDUPTHER

## 2023-11-03 RX ORDER — POTASSIUM CHLORIDE 750 MG/1
10 TABLET, EXTENDED RELEASE ORAL 2 TIMES DAILY
Qty: 180 TABLET | Refills: 0 | Status: SHIPPED | OUTPATIENT
Start: 2023-11-03 | End: 2024-01-20

## 2023-11-03 NOTE — TELEPHONE ENCOUNTER
Pt is requesting for a refill of: traMADoL (ULTRAM) 50 mg tablet  Last filed:9/06/23  Last encounter: 9/29/23  Up coming apt: 11//15/23  Pharmacy:[id number] - [address] -  Is this something you can do?

## 2023-11-03 NOTE — TELEPHONE ENCOUNTER
Provider Staff:  Action required. This patient has received emergency care.     Please schedule patient for a follow up appointment.     Thanks!  [facility]     Appointments      Date Provider   Last Visit   3/28/2023 ETAHN Munoz MD   Next Visit   3/12/2024 ETHAN Munoz MD

## 2023-11-03 NOTE — TELEPHONE ENCOUNTER
Refill Decision Note   Marcie Bazan  is requesting a refill authorization.  Brief Assessment and Rationale for Refill:  Approve     Medication Therapy Plan:  ED documentation reviewed. No change in therapy. Ok to approve. Patient will need OV with PCP.      Extended chart review required: Yes   Comments:     Note composed:3:08 AM 11/03/2023

## 2023-11-07 ENCOUNTER — HOSPITAL ENCOUNTER (OUTPATIENT)
Dept: RADIOLOGY | Facility: HOSPITAL | Age: 58
Discharge: HOME | End: 2023-11-07
Attending: PHYSICIAN ASSISTANT
Payer: MEDICAID

## 2023-11-07 ENCOUNTER — PATIENT MESSAGE (OUTPATIENT)
Dept: ADMINISTRATIVE | Facility: OTHER | Age: 58
End: 2023-11-07
Payer: MEDICAID

## 2023-11-07 DIAGNOSIS — Z12.31 ENCOUNTER FOR SCREENING MAMMOGRAM FOR MALIGNANT NEOPLASM OF BREAST: ICD-10-CM

## 2023-11-07 PROCEDURE — 77063 BREAST TOMOSYNTHESIS BI: CPT | Mod: 26,,, | Performed by: RADIOLOGY

## 2023-11-07 PROCEDURE — 77067 MAMMO DIGITAL SCREENING BILAT WITH TOMO: ICD-10-PCS | Mod: 26,,, | Performed by: RADIOLOGY

## 2023-11-07 PROCEDURE — 77067 SCR MAMMO BI INCL CAD: CPT | Mod: 26,,, | Performed by: RADIOLOGY

## 2023-11-07 PROCEDURE — 77067 SCR MAMMO BI INCL CAD: CPT | Mod: TC

## 2023-11-07 PROCEDURE — 77063 MAMMO DIGITAL SCREENING BILAT WITH TOMO: ICD-10-PCS | Mod: 26,,, | Performed by: RADIOLOGY

## 2023-11-08 ENCOUNTER — TELEPHONE (OUTPATIENT)
Dept: TRANSPLANT | Facility: CLINIC | Age: 58
End: 2023-11-08
Payer: MEDICAID

## 2023-11-08 NOTE — LETTER
November 8, 2023    Marcie Beni  [address] [address]          Dear Marcie Bazan:  MRN: [id number]    This is a follow up to your recent labs, your lab results were stable.  There are no medicine changes.  Please have your labs drawn again on 12/11/23.      ----- Message from Joselin Wallace MD sent at 11/7/2023  7:21 PM CST -----  Labs improving, repeat labs in a month      If you cannot have your labs drawn on the scheduled date, it is your responsibility to call the transplant department to reschedule.  Please call [phone] and ask to speak to Mica PURVIS -  for all scheduling requests.     Sincerely,    Viola SANDHUN, RN      Your Liver Transplant Coordinator    [facility]  [address] [place] [id number]  [phone]

## 2023-11-08 NOTE — TELEPHONE ENCOUNTER
Letter sent to patient stating: Your labs have been reviewed by your Transplant physician, no action required. Next labs due 12/11/23            ----- Message from Joselin Wallace MD sent at 11/7/2023  7:21 PM CST -----  Labs improving, repeat labs in a month

## 2023-11-13 ENCOUNTER — TELEPHONE (OUTPATIENT)
Dept: PAIN MEDICINE | Facility: CLINIC | Age: 58
End: 2023-11-13
Payer: MEDICAID

## 2023-11-13 ENCOUNTER — PATIENT MESSAGE (OUTPATIENT)
Dept: PAIN MEDICINE | Facility: CLINIC | Age: 58
End: 2023-11-13
Payer: MEDICAID

## 2023-11-13 NOTE — TELEPHONE ENCOUNTER
Called pt regarding appointment on 11/15. Informed pt that Emilee ORLANDO will be out of clinic. Informed pt that she does not need this appointment due to no other tx options being available. Informed pt that Emilee ORLANDO has placed a referral for neurosurgery and orthopedics to help assist with your pain. Provided pt with medicaid contact information for scheduling. Pt verblazuied understanding. All questions answered.

## 2023-11-14 ENCOUNTER — TELEPHONE (OUTPATIENT)
Dept: PAIN MEDICINE | Facility: CLINIC | Age: 58
End: 2023-11-14
Payer: MEDICAID

## 2023-11-14 NOTE — TELEPHONE ENCOUNTER
----- Message from Alonso Muñoz sent at 11/14/2023 12:31 PM CST -----  Contact: [phone]  Patient is requesting a call in regards to her neurosurgery an orthopedic referrals. Pt stated that clinic called and informed that thy do not treat for the condition patient has. Patient would like a new referral sent over to a different clinic. Please call pt back at  [phone]. Thanks KB

## 2023-11-14 NOTE — TELEPHONE ENCOUNTER
Please see below and advise. Patient is needing new referrals sent to outside [facility] for  neurosurgery an orthopedic referrals. Pt stated that [facility] called and informed that they do not treat for the condition patient has.

## 2023-11-16 ENCOUNTER — PATIENT MESSAGE (OUTPATIENT)
Dept: INTERNAL MEDICINE | Facility: CLINIC | Age: 58
End: 2023-11-16
Payer: MEDICAID

## 2023-11-16 DIAGNOSIS — R13.10 DYSPHAGIA, UNSPECIFIED TYPE: Primary | ICD-10-CM

## 2023-11-21 ENCOUNTER — TELEPHONE (OUTPATIENT)
Dept: OPHTHALMOLOGY | Facility: CLINIC | Age: 58
End: 2023-11-21
Payer: MEDICAID

## 2023-11-21 NOTE — TELEPHONE ENCOUNTER
----- Message from Fernanda Lucero sent at 11/21/2023  9:01 AM CST -----  Regarding: Reschedule Existing Appointment      Current appt date:   11/22    Type of appt :    Post-Op    Physician:   Dr. Jones    Reason for rescheduling:   Pt's son is currently in the ICU in [place]. She would like to have the appt with another optometrist at either [facility] or [facility].    Caller:   Marcie    Contact Preference:   [phone]

## 2023-11-22 ENCOUNTER — TELEPHONE (OUTPATIENT)
Dept: PAIN MEDICINE | Facility: CLINIC | Age: 58
End: 2023-11-22
Payer: MEDICAID

## 2023-11-22 NOTE — TELEPHONE ENCOUNTER
Returned pt call. Pt states that [facility] referred her to Dr. Filemon Whatley [facility] orthopedics phone number is [phone]. Pt states the Dr. Whatley's office needs a new referral. Informed pt that I will fax the referral over. Pt verbalized understanding.

## 2023-11-27 ENCOUNTER — OFFICE VISIT (OUTPATIENT)
Dept: OPHTHALMOLOGY | Facility: CLINIC | Age: 58
End: 2023-11-27
Payer: MEDICAID

## 2023-11-27 DIAGNOSIS — Z96.1 PSEUDOPHAKIA OF BOTH EYES: ICD-10-CM

## 2023-11-27 DIAGNOSIS — H26.493 PCO (POSTERIOR CAPSULAR OPACIFICATION), BILATERAL: ICD-10-CM

## 2023-11-27 DIAGNOSIS — Z98.890 POST-OPERATIVE STATE: Primary | ICD-10-CM

## 2023-11-27 PROCEDURE — 3044F HG A1C LEVEL LT 7.0%: CPT | Mod: CPTII,,, | Performed by: OPTOMETRIST

## 2023-11-27 PROCEDURE — 4010F PR ACE/ARB THEARPY RXD/TAKEN: ICD-10-PCS | Mod: CPTII,,, | Performed by: OPTOMETRIST

## 2023-11-27 PROCEDURE — 4010F ACE/ARB THERAPY RXD/TAKEN: CPT | Mod: CPTII,,, | Performed by: OPTOMETRIST

## 2023-11-27 PROCEDURE — 1160F PR REVIEW ALL MEDS BY PRESCRIBER/CLIN PHARMACIST DOCUMENTED: ICD-10-PCS | Mod: CPTII,,, | Performed by: OPTOMETRIST

## 2023-11-27 PROCEDURE — 99999 PR PBB SHADOW E&M-EST. PATIENT-LVL III: ICD-10-PCS | Mod: PBBFAC,,, | Performed by: OPTOMETRIST

## 2023-11-27 PROCEDURE — 99213 OFFICE O/P EST LOW 20 MIN: CPT | Mod: PBBFAC | Performed by: OPTOMETRIST

## 2023-11-27 PROCEDURE — 99024 POSTOP FOLLOW-UP VISIT: CPT | Mod: ,,, | Performed by: OPTOMETRIST

## 2023-11-27 PROCEDURE — 3044F PR MOST RECENT HEMOGLOBIN A1C LEVEL <7.0%: ICD-10-PCS | Mod: CPTII,,, | Performed by: OPTOMETRIST

## 2023-11-27 PROCEDURE — 1159F PR MEDICATION LIST DOCUMENTED IN MEDICAL RECORD: ICD-10-PCS | Mod: CPTII,,, | Performed by: OPTOMETRIST

## 2023-11-27 PROCEDURE — 99999 PR PBB SHADOW E&M-EST. PATIENT-LVL III: CPT | Mod: PBBFAC,,, | Performed by: OPTOMETRIST

## 2023-11-27 PROCEDURE — 99024 PR POST-OP FOLLOW-UP VISIT: ICD-10-PCS | Mod: ,,, | Performed by: OPTOMETRIST

## 2023-11-27 PROCEDURE — 1160F RVW MEDS BY RX/DR IN RCRD: CPT | Mod: CPTII,,, | Performed by: OPTOMETRIST

## 2023-11-27 PROCEDURE — 1159F MED LIST DOCD IN RCRD: CPT | Mod: CPTII,,, | Performed by: OPTOMETRIST

## 2023-11-27 NOTE — PROGRESS NOTES
HPI     Post-op Evaluation            Comments: Pt reports for 1 month for post op , no pain or irritations           Last edited by Caroline Correia MA on 11/27/2023  8:12 AM.            Assessment /Plan     For exam results, see Encounter Report.    Post-operative state    Pseudophakia of both eyes    PCO (posterior capsular opacification), bilateral    Doing well OU  Mild PCO OU, YAG not yet indicated  Continue post op gtts as directed on post op sheet  New spec rx given, ok to c/w OTC readers  Eyeglass Final Rx       Eyeglass Final Rx         Sphere Cylinder Axis Add    Right [place]   +2.50    Left -0.75 +0.50 115 +2.50      Expiration Date: 11/27/2024                  RTC 6 months for dilated eye exam, PRN sooner if any problems or concerns

## 2023-11-30 ENCOUNTER — TELEPHONE (OUTPATIENT)
Dept: NEUROSURGERY | Facility: CLINIC | Age: 58
End: 2023-11-30
Payer: MEDICAID

## 2023-11-30 ENCOUNTER — PATIENT MESSAGE (OUTPATIENT)
Dept: NEUROSURGERY | Facility: CLINIC | Age: 58
End: 2023-11-30
Payer: MEDICAID

## 2023-11-30 NOTE — TELEPHONE ENCOUNTER
----- Message from Coty Posada RN sent at 11/28/2023  8:42 AM CST -----  Regarding: RE: NP Referral  Michelle Bella, I was out yesterday. All the patients  you sent me are Medicaid. I do not even call them. They are to be forwarded to the Medicaid [facility].  ----- Message -----  From: Bella Rendon MA  Sent: 11/22/2023   2:55 PM CST  To: Coty Posada RN  Subject: NP Referral                                        ----- Message -----  From: Abebe Soliz  Sent: 11/22/2023   9:34 AM CST  To: [facility] [place] [facility] Neurosurgery Clinical Support    Name of Who is Calling:GUADALUPE TOMLIN [[id number]]        What is the request in detail:Pt would like a call back to schedule appt, pt has referral in epic. Please advise thank you       Can the clinic reply by [facility]:NO         What Number to Call Back if not in [facility]:.Telephone Information:  Mobile          [phone]

## 2023-12-01 DIAGNOSIS — G89.4 CHRONIC PAIN SYNDROME: ICD-10-CM

## 2023-12-01 DIAGNOSIS — E83.42 HYPOMAGNESEMIA: ICD-10-CM

## 2023-12-03 RX ORDER — LANOLIN ALCOHOL/MO/W.PET/CERES
1 CREAM (GRAM) TOPICAL 2 TIMES DAILY
Qty: 60 TABLET | Refills: 2 | Status: SHIPPED | OUTPATIENT
Start: 2023-12-03

## 2023-12-04 RX ORDER — DULOXETIN HYDROCHLORIDE 30 MG/1
30 CAPSULE, DELAYED RELEASE ORAL
Qty: 30 CAPSULE | Refills: 2 | Status: SHIPPED | OUTPATIENT
Start: 2023-12-04 | End: 2024-03-05

## 2023-12-05 ENCOUNTER — TELEPHONE (OUTPATIENT)
Dept: HEPATOLOGY | Facility: CLINIC | Age: 58
End: 2023-12-05
Payer: MEDICAID

## 2023-12-05 NOTE — TELEPHONE ENCOUNTER
Returned patient's call, I attempted to get her scheduled with Gastro but the slot needs to be overridden.

## 2023-12-05 NOTE — TELEPHONE ENCOUNTER
----- Message from Susanna Teajda sent at 12/5/2023  4:28 PM CST -----  Regarding: Referral      What is the request in detail: Please call pt to schedule Gastro referral.Please advise.    Can the clinic reply by [facility]? No    Best Call Back Number: [phone]     Additional Information:

## 2023-12-11 ENCOUNTER — PATIENT MESSAGE (OUTPATIENT)
Dept: PAIN MEDICINE | Facility: CLINIC | Age: 58
End: 2023-12-11
Payer: MEDICAID

## 2023-12-12 ENCOUNTER — LAB VISIT (OUTPATIENT)
Dept: LAB | Facility: HOSPITAL | Age: 58
End: 2023-12-12
Attending: INTERNAL MEDICINE
Payer: MEDICAID

## 2023-12-12 DIAGNOSIS — Z94.4 LIVER REPLACED BY TRANSPLANT: ICD-10-CM

## 2023-12-12 DIAGNOSIS — M54.16 RIGHT LUMBAR RADICULOPATHY: ICD-10-CM

## 2023-12-12 LAB
ALBUMIN SERPL BCP-MCNC: 3.6 G/DL (ref 3.5–5.2)
ALP SERPL-CCNC: 164 U/L (ref 55–135)
ALT SERPL W/O P-5'-P-CCNC: 8 U/L (ref 10–44)
ANION GAP SERPL CALC-SCNC: 11 MMOL/L (ref 8–16)
AST SERPL-CCNC: 12 U/L (ref 10–40)
BASOPHILS # BLD AUTO: 0.05 K/UL (ref 0–0.2)
BASOPHILS NFR BLD: 0.5 % (ref 0–1.9)
BILIRUB SERPL-MCNC: 0.6 MG/DL (ref 0.1–1)
BUN SERPL-MCNC: 9 MG/DL (ref 6–20)
CALCIUM SERPL-MCNC: 9.3 MG/DL (ref 8.7–10.5)
CHLORIDE SERPL-SCNC: 104 MMOL/L (ref 95–110)
CO2 SERPL-SCNC: 27 MMOL/L (ref 23–29)
CREAT SERPL-MCNC: 0.8 MG/DL (ref 0.5–1.4)
DIFFERENTIAL METHOD: ABNORMAL
EOSINOPHIL # BLD AUTO: 0.3 K/UL (ref 0–0.5)
EOSINOPHIL NFR BLD: 3.2 % (ref 0–8)
ERYTHROCYTE [DISTWIDTH] IN BLOOD BY AUTOMATED COUNT: 14.9 % (ref 11.5–14.5)
EST. GFR  (NO RACE VARIABLE): >60 ML/MIN/1.73 M^2
GGT SERPL-CCNC: 19 U/L (ref 8–55)
GLUCOSE SERPL-MCNC: 109 MG/DL (ref 70–110)
HCT VFR BLD AUTO: 42.3 % (ref 37–48.5)
HGB BLD-MCNC: 13.4 G/DL (ref 12–16)
IMM GRANULOCYTES # BLD AUTO: 0.02 K/UL (ref 0–0.04)
IMM GRANULOCYTES NFR BLD AUTO: 0.2 % (ref 0–0.5)
LYMPHOCYTES # BLD AUTO: 2.5 K/UL (ref 1–4.8)
LYMPHOCYTES NFR BLD: 24.7 % (ref 18–48)
MCH RBC QN AUTO: 25.8 PG (ref 27–31)
MCHC RBC AUTO-ENTMCNC: 31.7 G/DL (ref 32–36)
MCV RBC AUTO: 82 FL (ref 82–98)
MONOCYTES # BLD AUTO: 0.6 K/UL (ref 0.3–1)
MONOCYTES NFR BLD: 5.8 % (ref 4–15)
NEUTROPHILS # BLD AUTO: 6.6 K/UL (ref 1.8–7.7)
NEUTROPHILS NFR BLD: 65.6 % (ref 38–73)
NRBC BLD-RTO: 0 /100 WBC
PLATELET # BLD AUTO: 105 K/UL (ref 150–450)
PMV BLD AUTO: 12 FL (ref 9.2–12.9)
POTASSIUM SERPL-SCNC: 4.5 MMOL/L (ref 3.5–5.1)
PROT SERPL-MCNC: 7.6 G/DL (ref 6–8.4)
RBC # BLD AUTO: 5.19 M/UL (ref 4–5.4)
SODIUM SERPL-SCNC: 142 MMOL/L (ref 136–145)
WBC # BLD AUTO: 10.1 K/UL (ref 3.9–12.7)

## 2023-12-12 PROCEDURE — 82977 ASSAY OF GGT: CPT | Performed by: INTERNAL MEDICINE

## 2023-12-12 PROCEDURE — 80197 ASSAY OF TACROLIMUS: CPT | Performed by: INTERNAL MEDICINE

## 2023-12-12 PROCEDURE — 36415 COLL VENOUS BLD VENIPUNCTURE: CPT | Performed by: INTERNAL MEDICINE

## 2023-12-12 PROCEDURE — 80053 COMPREHEN METABOLIC PANEL: CPT | Performed by: INTERNAL MEDICINE

## 2023-12-12 PROCEDURE — 85025 COMPLETE CBC W/AUTO DIFF WBC: CPT | Performed by: INTERNAL MEDICINE

## 2023-12-12 RX ORDER — TRAMADOL HYDROCHLORIDE 50 MG/1
TABLET ORAL
Qty: 14 TABLET | Refills: 0 | Status: SHIPPED | OUTPATIENT
Start: 2023-12-12 | End: 2024-03-12

## 2023-12-12 NOTE — TELEPHONE ENCOUNTER
Can you refill?    Last Visit: 09/29/2023  Next Visit:Follow up: PRN    Last refill:11/03/2023  How pt patient is currently taking medication requested: TAKE 1/2 TO 1 TABLET BY MOUTH EVERY 8 HOURS AS NEEDED FOR PAIN   Pharmacy:   [id number] - [place] - [address] AT [facility]

## 2023-12-13 LAB — TACROLIMUS BLD-MCNC: 7.8 NG/ML (ref 5–15)

## 2023-12-15 ENCOUNTER — TELEPHONE (OUTPATIENT)
Dept: TRANSPLANT | Facility: CLINIC | Age: 58
End: 2023-12-15
Payer: MEDICAID

## 2023-12-15 NOTE — TELEPHONE ENCOUNTER
Called patient with results. Patient verbalized understanding. Patient currently taking 75mcg already. Will need to verify what dose patient should be taking.   Pt last seen 9/11/20

## 2023-12-15 NOTE — TELEPHONE ENCOUNTER
Letter sent to patient stating: Your labs have been reviewed by your Transplant physician, no action required. Next labs due 3/11/2024        ---- Message from Joselin Wallace MD sent at 12/14/2023  9:09 PM CST -----  Reviewed, nothing to do; repeat per routine

## 2023-12-15 NOTE — LETTER
December 15, 2023    Marcie Bazan  [address] [address]          Dear Marcie Bazan:  MRN: [id number]    This is a follow up to your recent labs, your lab results were stable.  There are no medicine changes.  Please have your labs drawn again on 3/11/2024.      If you cannot have your labs drawn on the scheduled date, it is your responsibility to call the transplant department to reschedule.  Please call [phone] and ask to speak to Mica PURVIS -  for all scheduling requests.     Sincerely,    Viola SANDHUN, RN      Your Liver Transplant Coordinator    [facility]  [address] [place] [id number] [phone]

## 2023-12-21 DIAGNOSIS — Z94.4 LIVER REPLACED BY TRANSPLANT: Primary | ICD-10-CM

## 2023-12-27 DIAGNOSIS — G47.01 INSOMNIA SECONDARY TO CHRONIC PAIN: ICD-10-CM

## 2023-12-27 DIAGNOSIS — G89.29 INSOMNIA SECONDARY TO CHRONIC PAIN: ICD-10-CM

## 2023-12-27 RX ORDER — AMITRIPTYLINE HYDROCHLORIDE 25 MG/1
TABLET, FILM COATED ORAL
Qty: 60 TABLET | Refills: 0 | Status: SHIPPED | OUTPATIENT
Start: 2023-12-27 | End: 2024-02-23 | Stop reason: SDUPTHER

## 2023-12-28 NOTE — TELEPHONE ENCOUNTER
SW received call from pt's daughter Viola who reports pt only has one dose of Zofran left and has no refills. SW contacted Tiesha Ross who is covering for pt's RN Coord (Cassidy) today. Tiesha reports she will put a refill in and has advised daughter to contact Pharmacy tonight before they close at 7 to see if it is ready as a doc needs to sign it. If not, Tiesha advised daughter to contact pharmacy in the morning to check if prescription is ready as pharmacy closes at 2. SW relayed message and daughter verbalized understanding. No other issues or concerns noted. SW remains available.   no

## 2024-01-06 NOTE — PROGRESS NOTES
Subjective:      Patient ID: Marcie Bazan is a 54 y.o. female.    Chief Complaint: No chief complaint on file.    The patient location is: at home in [place]  The chief complaint leading to consultation is: shortness of breath, URI  Visit type: audiovisual  Total time spent with patient: 15 minutes  Each patient to whom he or she provides medical services by telemedicine is:  (1) informed of the relationship between the physician and patient and the respective role of any other health care provider with respect to management of the patient; and (2) notified that he or she may decline to receive medical services by telemedicine and may withdraw from such care at any time.    Cough   This is a new problem. The current episode started 1 to 4 weeks ago. The problem has been unchanged. The problem occurs constantly. The cough is non-productive. Associated symptoms include chest pain, chills, headaches, nasal congestion, postnasal drip, rhinorrhea, a sore throat, shortness of breath and wheezing. Pertinent negatives include no ear congestion, ear pain, fever, heartburn, hemoptysis, myalgias, rash, sweats or weight loss. She has tried nothing for the symptoms. The treatment provided no relief. There is no history of asthma, bronchiectasis, bronchitis, COPD, emphysema, environmental allergies or pneumonia.   In addition pt was seen by hem/onc for her chronic fatigue and on a PET scan they noticed some nodules on her thyroid. She was told to follow up with an endocrinologist. She has not yet had an appointment with them. She needs a referral.         Review of Systems   Constitutional: Positive for chills, diaphoresis and fatigue. Negative for activity change, appetite change, fever, unexpected weight change and weight loss.   HENT: Positive for congestion, postnasal drip, rhinorrhea, sinus pain, sore throat and trouble swallowing. Negative for ear pain, hearing loss and voice change.    Eyes: Negative.  Negative  for visual disturbance.   Respiratory: Positive for cough, chest tightness, shortness of breath and wheezing. Negative for hemoptysis and choking.    Cardiovascular: Positive for chest pain. Negative for palpitations and leg swelling.   Gastrointestinal: Negative for abdominal distention, abdominal pain, blood in stool, constipation, diarrhea, heartburn, nausea and vomiting.   Endocrine: Negative for cold intolerance, heat intolerance, polydipsia and polyuria.   Genitourinary: Negative.  Negative for difficulty urinating and frequency.   Musculoskeletal: Negative for arthralgias, back pain, gait problem, joint swelling and myalgias.   Skin: Negative for color change, pallor, rash and wound.   Allergic/Immunologic: Negative for environmental allergies.   Neurological: Positive for headaches. Negative for dizziness, tremors, weakness, light-headedness and numbness.   Hematological: Negative for adenopathy.   Psychiatric/Behavioral: Negative for behavioral problems, confusion, self-injury, sleep disturbance and suicidal ideas. The patient is not nervous/anxious.      Objective:   LMP 01/01/1985 (Approximate) Comment: 1985    Physical Exam   Constitutional: She appears well-developed and well-nourished. No distress.   Pulmonary/Chest: No accessory muscle usage. No respiratory distress.   Skin: She is not diaphoretic.       Assessment:     1. Multinodular goiter    2. Shortness of breath      Plan:   Multinodular goiter  -     Ambulatory referral/consult to Endocrinology; Future; Expected date: 05/12/2020    Shortness of breath  -     COVID-19 Routine Screening; Future      -if covid is negative, will start antibiotic.   -covid instructions sent to her patient portal    Follow up if symptoms worsen or fail to improve.       0

## 2024-01-19 DIAGNOSIS — E87.6 DIURETIC-INDUCED HYPOKALEMIA: ICD-10-CM

## 2024-01-19 DIAGNOSIS — T50.2X5A DIURETIC-INDUCED HYPOKALEMIA: ICD-10-CM

## 2024-01-19 NOTE — TELEPHONE ENCOUNTER
Care Due:                  Date            Visit Type   Department     Provider  --------------------------------------------------------------------------------                                EP -                              PRIMARY      [facility] INTER[facility]  Last Visit: 03-      CARE ([facility])   SMOOTH Munoz                              EP -                              PRIMARY      [facility] INTERNAL  Next Visit: 03-      CARE ([facility])   SMOOTH Munoz                                                            Last  Test          Frequency    Reason                     Performed    Due Date  --------------------------------------------------------------------------------    Lipid Panel.  12 months..  atorvastatin.............  04- 04-    Health [facility] Embedded Care Due Messages. Reference number: [id number].   1/19/2024 11:36:39 AM CST

## 2024-01-20 RX ORDER — POTASSIUM CHLORIDE 750 MG/1
10 TABLET, EXTENDED RELEASE ORAL 2 TIMES DAILY
Qty: 180 TABLET | Refills: 0 | Status: SHIPPED | OUTPATIENT
Start: 2024-01-20 | End: 2024-03-12 | Stop reason: SDUPTHER

## 2024-01-20 NOTE — TELEPHONE ENCOUNTER
Provider Staff:  Action required for this patient    Requires labs  Lipids     Please see care gap opportunities below in Care Due Message.    Thanks!  [facility]     Appointments      Date Provider   Last Visit   3/28/2023 ETHAN Munoz MD   Next Visit   3/12/2024 ETHAN Munoz MD     Refill Decision Note   Marcie Bazan  is requesting a refill authorization.  Brief Assessment and Rationale for Refill:  Approve     Medication Therapy Plan:  ED noted reviewed. Therapy unchanged. Will approve 90 days. FOV 3/12/24      Comments:     Note composed:8:38 AM 01/20/2024

## 2024-01-22 ENCOUNTER — OFFICE VISIT (OUTPATIENT)
Dept: URGENT CARE | Facility: CLINIC | Age: 59
End: 2024-01-22
Payer: MEDICAID

## 2024-01-22 ENCOUNTER — HOSPITAL ENCOUNTER (OUTPATIENT)
Dept: RADIOLOGY | Facility: CLINIC | Age: 59
Discharge: HOME | End: 2024-01-22
Attending: NURSE PRACTITIONER
Payer: MEDICAID

## 2024-01-22 VITALS
BODY MASS INDEX: 33.15 KG/M2 | WEIGHT: 199 LBS | SYSTOLIC BLOOD PRESSURE: 128 MMHG | OXYGEN SATURATION: 96 % | HEART RATE: 91 BPM | TEMPERATURE: 98 F | DIASTOLIC BLOOD PRESSURE: 66 MMHG | RESPIRATION RATE: 16 BRPM | HEIGHT: 65 IN

## 2024-01-22 DIAGNOSIS — M25.472 LEFT ANKLE SWELLING: ICD-10-CM

## 2024-01-22 DIAGNOSIS — R60.0 BILATERAL LEG EDEMA: ICD-10-CM

## 2024-01-22 DIAGNOSIS — Z29.89 PROPHYLACTIC IMMUNOTHERAPY: ICD-10-CM

## 2024-01-22 DIAGNOSIS — S93.402A MODERATE ANKLE SPRAIN, LEFT, INITIAL ENCOUNTER: Primary | ICD-10-CM

## 2024-01-22 DIAGNOSIS — M25.572 ACUTE LEFT ANKLE PAIN: ICD-10-CM

## 2024-01-22 PROCEDURE — 99214 OFFICE O/P EST MOD 30 MIN: CPT | Mod: S$GLB,,, | Performed by: NURSE PRACTITIONER

## 2024-01-22 PROCEDURE — 73610 X-RAY EXAM OF ANKLE: CPT | Mod: LT,S$GLB,, | Performed by: RADIOLOGY

## 2024-01-22 RX ORDER — LISINOPRIL 5 MG/1
1 TABLET ORAL EVERY MORNING
COMMUNITY
End: 2024-03-12

## 2024-01-22 RX ORDER — TRAMADOL HYDROCHLORIDE 50 MG/1
25 TABLET ORAL EVERY 6 HOURS
Qty: 10 TABLET | Refills: 0 | Status: SHIPPED | OUTPATIENT
Start: 2024-01-22 | End: 2024-03-12

## 2024-01-22 RX ORDER — FUROSEMIDE 40 MG/1
40 TABLET ORAL DAILY
Qty: 90 TABLET | Refills: 1 | Status: SHIPPED | OUTPATIENT
Start: 2024-01-22 | End: 2024-03-12 | Stop reason: SDUPTHER

## 2024-01-22 NOTE — TELEPHONE ENCOUNTER
No care due was identified.  Health [facility] Embedded Care Due Messages. Reference number: [id number].   1/22/2024 10:51:00 AM CST

## 2024-01-22 NOTE — TELEPHONE ENCOUNTER
----- Message from Elizabeth Barger sent at 1/19/2024 12:13 PM CST -----  Regarding: concerns / appt  Name of who is calling:   Marcie      What is the request in detail: Pt is requesting a call back in ref to esophagus has gotten worse / throwing up a lot and needs to be seen today or asap      Can the clinic reply by [facility]:no      What number to call back if not [facility]:[phone]

## 2024-01-22 NOTE — PROGRESS NOTES
"Subjective:      Patient ID: Marcie Bazan is a 58 y.o. female.    Vitals:  height is 5' 5" (1.651 m) and weight is 90.3 kg (199 lb). Her oral temperature is 98.4 °F (36.9 °C). Her blood pressure is 128/66 and her pulse is 91. Her respiration is 16 and oxygen saturation is 96%.     Chief Complaint: Ankle Pain    Marcie Bazan is a 58 year old female whom presents to urgent  are for evaluation of left ankle pain and swelling since yesterday. She denies any known injury. She rates her pain 7/10 at rest. She has not done any kind of treatment for the ankle as of yet.    Ankle Pain   The incident occurred 12 to 24 hours ago. The incident occurred at home. There was no injury mechanism. The pain is present in the left ankle. The pain is at a severity of 7/10. The pain is moderate. The pain has been Constant since onset. Associated symptoms include an inability to bear weight, a loss of motion, numbness and tingling. Pertinent negatives include no loss of sensation or muscle weakness. She reports no foreign bodies present. The symptoms are aggravated by movement, weight bearing and palpation. She has tried nothing for the symptoms.       Neurological:  Positive for numbness.      Objective:     Physical Exam   Constitutional: She is oriented to person, place, and time. She appears well-developed. She is cooperative.   HENT:   Head: Normocephalic and atraumatic.   Ears:   Right Ear: Hearing, tympanic membrane, external ear and ear canal normal.   Left Ear: Hearing, tympanic membrane, external ear and ear canal normal.   Nose: Nose normal. No mucosal edema or nasal deformity. No epistaxis. Right sinus exhibits no maxillary sinus tenderness and no frontal sinus tenderness. Left sinus exhibits no maxillary sinus tenderness and no frontal sinus tenderness.   Mouth/Throat: Uvula is midline, oropharynx is clear and moist and mucous membranes are normal. No trismus in the jaw. Normal dentition. No uvula swelling.   Eyes: " Conjunctivae and lids are normal.   Neck: Trachea normal and phonation normal. Neck supple.   Cardiovascular: Normal rate, regular rhythm, normal heart sounds and normal pulses.   Pulmonary/Chest: Effort normal and breath sounds normal.   Abdominal: Normal appearance and bowel sounds are normal. Soft.   Musculoskeletal:         General: Swelling present.      Right ankle: Normal.      Left ankle: She exhibits decreased range of motion and swelling. Tenderness. Lateral malleolus and CF ligament tenderness found.   Neurological: no focal deficit. She is alert, oriented to person, place, and time and at baseline. She exhibits normal muscle tone.   Skin: Skin is warm, dry and intact. Capillary refill takes less than 2 seconds.   Psychiatric: Her speech is normal and behavior is normal. Judgment and thought content normal.   Nursing note and vitals reviewed.      Assessment:     1. Moderate ankle sprain, left, initial encounter    2. Acute left ankle pain    3. Left ankle swelling        Plan:       Moderate ankle sprain, left, initial encounter  -     traMADoL (ULTRAM) 50 mg tablet; Take 0.5 tablets (25 mg total) by mouth every 6 (six) hours.  Dispense: 10 tablet; Refill: 0    Acute left ankle pain  -     X-Ray Ankle Complete Left; Future; Expected date: 01/22/2024  -     BANDAGE ELASTIC 4IN ACE NS  -     traMADoL (ULTRAM) 50 mg tablet; Take 0.5 tablets (25 mg total) by mouth every 6 (six) hours.  Dispense: 10 tablet; Refill: 0    Left ankle swelling  -     X-Ray Ankle Complete Left; Future; Expected date: 01/22/2024  -     BANDAGE ELASTIC 4IN ACE NS          Medical Decision Making:   Differential Diagnosis:   Ankle sprain,tendon rupture, tendinopathy, fracture, stress fracture, tendon subluxation, ankle sprain    Clinical Tests:   Radiological Study: Ordered and Reviewed  Urgent Care Management:  Previous encounters were independently reviewed. P.R.I.C.E therapy was discussed. Patient was placed in a figure 8 ace wrap  for stability and compression patient is unstable and at increased risk for falls so crutches were not given. Healing expectations were discussed. Patient was instructed to begin applying pressure as tolerated. Pain control with alteration of tylenol and ibuprofen. Tramadol as needed for excruciating pain. Patient was instructed to follow up with her PCP for re-evaluation if there is no improvement in paint and swelling over the next week. Patient was instructed to report to the nearest ER with any new/concerning symptoms. Treatment plan as well as options and alternatives reviewed and discussed with patient. All of the patients questions and concerns were addressed.The patient verbalized understanding and agrees with the discussed plan of care. Patient remained stable and was discharged in no acute distress.         Patient Instructions   What is an ankle sprain?  The injury that occurs when your foot rolls, or turns in on itself, is called an ankle sprain. In this injury, the ligaments that hold the ankle and foot bones in place are stretched and weakened.    How can you care for yourself at home?  PRICE therapy:  Protect the joint with stabilizing brace like a neoprene sleeve or taping.   Rest the extremity--limit activity with this area  Ice 2-3 times a day for 20 minute intervals for first 48 hours or until inflammation has stabilized   Compression to provide support and help prevent swelling  Elevation above heart level to help decrease swelling  Prop up your foot on pillows as much as possible for the next 3 days. Try to keep your ankle above the level of your heart. This will help reduce the swelling.  Put ice or cold packs on your injured ankle for 10 to 20 minutes at a time. Try to do this every 1 to 2 hours for the next 3 days (when you are awake) or until the swelling goes down. Put a thin cloth between the ice and your skin.  Take pain medicines exactly as directed. Alternate Tylenol and ibuprofen  every 4 hours as needed for pain.  Take prescription pain medication as directed. These medications may cause drowsiness so please do not drive or operate heavy machinery while taking.    Not all fractures can be seen immediatly due to swelling so if pain is not improving over the next week it is recommended you seek repeat evaluation with your PCP. Report to the nearest ER with new/Worsening symptoms.

## 2024-01-22 NOTE — PATIENT INSTRUCTIONS
What is an ankle sprain?  The injury that occurs when your foot rolls, or turns in on itself, is called an ankle sprain. In this injury, the ligaments that hold the ankle and foot bones in place are stretched and weakened.    How can you care for yourself at home?  PRICE therapy:  Protect the joint with stabilizing brace like a neoprene sleeve or taping.   Rest the extremity--limit activity with this area  Ice 2-3 times a day for 20 minute intervals for first 48 hours or until inflammation has stabilized   Compression to provide support and help prevent swelling  Elevation above heart level to help decrease swelling  Prop up your foot on pillows as much as possible for the next 3 days. Try to keep your ankle above the level of your heart. This will help reduce the swelling.  Put ice or cold packs on your injured ankle for 10 to 20 minutes at a time. Try to do this every 1 to 2 hours for the next 3 days (when you are awake) or until the swelling goes down. Put a thin cloth between the ice and your skin.  Take pain medicines exactly as directed. Alternate Tylenol and ibuprofen every 4 hours as needed for pain.  Take prescription pain medication as directed. These medications may cause drowsiness so please do not drive or operate heavy machinery while taking.    Not all fractures can be seen immediatly due to swelling so if pain is not improving over the next week it is recommended you seek repeat evaluation with your PCP. Report to the nearest ER with new/Worsening symptoms.

## 2024-01-31 ENCOUNTER — OFFICE VISIT (OUTPATIENT)
Dept: INTERNAL MEDICINE | Facility: CLINIC | Age: 59
End: 2024-01-31
Payer: MEDICAID

## 2024-01-31 ENCOUNTER — LAB VISIT (OUTPATIENT)
Dept: LAB | Facility: HOSPITAL | Age: 59
End: 2024-01-31
Attending: PHYSICIAN ASSISTANT
Payer: MEDICAID

## 2024-01-31 VITALS
HEART RATE: 92 BPM | SYSTOLIC BLOOD PRESSURE: 120 MMHG | HEIGHT: 65 IN | OXYGEN SATURATION: 97 % | TEMPERATURE: 98 F | DIASTOLIC BLOOD PRESSURE: 70 MMHG | BODY MASS INDEX: 35.48 KG/M2 | WEIGHT: 212.94 LBS

## 2024-01-31 DIAGNOSIS — G47.33 OSA (OBSTRUCTIVE SLEEP APNEA): Primary | ICD-10-CM

## 2024-01-31 DIAGNOSIS — R11.2 NAUSEA AND VOMITING, UNSPECIFIED VOMITING TYPE: ICD-10-CM

## 2024-01-31 DIAGNOSIS — K21.9 GASTROESOPHAGEAL REFLUX DISEASE, UNSPECIFIED WHETHER ESOPHAGITIS PRESENT: ICD-10-CM

## 2024-01-31 DIAGNOSIS — E04.1 THYROID NODULE: ICD-10-CM

## 2024-01-31 DIAGNOSIS — T17.308D CHOKING, SUBSEQUENT ENCOUNTER: ICD-10-CM

## 2024-01-31 LAB
ALBUMIN SERPL BCP-MCNC: 3.8 G/DL (ref 3.5–5.2)
ALP SERPL-CCNC: 280 U/L (ref 55–135)
ALT SERPL W/O P-5'-P-CCNC: 27 U/L (ref 10–44)
ANION GAP SERPL CALC-SCNC: 9 MMOL/L (ref 8–16)
AST SERPL-CCNC: 13 U/L (ref 10–40)
BASOPHILS # BLD AUTO: 0.03 K/UL (ref 0–0.2)
BASOPHILS NFR BLD: 0.3 % (ref 0–1.9)
BILIRUB SERPL-MCNC: 0.5 MG/DL (ref 0.1–1)
BUN SERPL-MCNC: 7 MG/DL (ref 6–20)
CALCIUM SERPL-MCNC: 9.4 MG/DL (ref 8.7–10.5)
CHLORIDE SERPL-SCNC: 104 MMOL/L (ref 95–110)
CO2 SERPL-SCNC: 29 MMOL/L (ref 23–29)
CREAT SERPL-MCNC: 0.9 MG/DL (ref 0.5–1.4)
DIFFERENTIAL METHOD BLD: ABNORMAL
EOSINOPHIL # BLD AUTO: 0.3 K/UL (ref 0–0.5)
EOSINOPHIL NFR BLD: 3.2 % (ref 0–8)
ERYTHROCYTE [DISTWIDTH] IN BLOOD BY AUTOMATED COUNT: 15.7 % (ref 11.5–14.5)
EST. GFR  (NO RACE VARIABLE): >60 ML/MIN/1.73 M^2
GLUCOSE SERPL-MCNC: 95 MG/DL (ref 70–110)
HCT VFR BLD AUTO: 42 % (ref 37–48.5)
HGB BLD-MCNC: 13.3 G/DL (ref 12–16)
IMM GRANULOCYTES # BLD AUTO: 0.02 K/UL (ref 0–0.04)
IMM GRANULOCYTES NFR BLD AUTO: 0.2 % (ref 0–0.5)
LIPASE SERPL-CCNC: 5 U/L (ref 4–60)
LYMPHOCYTES # BLD AUTO: 2.4 K/UL (ref 1–4.8)
LYMPHOCYTES NFR BLD: 24 % (ref 18–48)
MCH RBC QN AUTO: 24.8 PG (ref 27–31)
MCHC RBC AUTO-ENTMCNC: 31.7 G/DL (ref 32–36)
MCV RBC AUTO: 78 FL (ref 82–98)
MONOCYTES # BLD AUTO: 0.6 K/UL (ref 0.3–1)
MONOCYTES NFR BLD: 5.5 % (ref 4–15)
NEUTROPHILS # BLD AUTO: 6.6 K/UL (ref 1.8–7.7)
NEUTROPHILS NFR BLD: 66.8 % (ref 38–73)
NRBC BLD-RTO: 0 /100 WBC
PLATELET # BLD AUTO: 313 K/UL (ref 150–450)
PMV BLD AUTO: 10.1 FL (ref 9.2–12.9)
POTASSIUM SERPL-SCNC: 3.7 MMOL/L (ref 3.5–5.1)
PROT SERPL-MCNC: 8 G/DL (ref 6–8.4)
RBC # BLD AUTO: 5.36 M/UL (ref 4–5.4)
SODIUM SERPL-SCNC: 142 MMOL/L (ref 136–145)
WBC # BLD AUTO: 9.95 K/UL (ref 3.9–12.7)

## 2024-01-31 PROCEDURE — 1159F MED LIST DOCD IN RCRD: CPT | Mod: CPTII,,, | Performed by: PHYSICIAN ASSISTANT

## 2024-01-31 PROCEDURE — 99214 OFFICE O/P EST MOD 30 MIN: CPT | Mod: PBBFAC | Performed by: PHYSICIAN ASSISTANT

## 2024-01-31 PROCEDURE — 4010F ACE/ARB THERAPY RXD/TAKEN: CPT | Mod: CPTII,,, | Performed by: PHYSICIAN ASSISTANT

## 2024-01-31 PROCEDURE — 85025 COMPLETE CBC W/AUTO DIFF WBC: CPT | Performed by: PHYSICIAN ASSISTANT

## 2024-01-31 PROCEDURE — 3008F BODY MASS INDEX DOCD: CPT | Mod: CPTII,,, | Performed by: PHYSICIAN ASSISTANT

## 2024-01-31 PROCEDURE — 1160F RVW MEDS BY RX/DR IN RCRD: CPT | Mod: CPTII,,, | Performed by: PHYSICIAN ASSISTANT

## 2024-01-31 PROCEDURE — 99999 PR PBB SHADOW E&M-EST. PATIENT-LVL IV: CPT | Mod: PBBFAC,,, | Performed by: PHYSICIAN ASSISTANT

## 2024-01-31 PROCEDURE — 3074F SYST BP LT 130 MM HG: CPT | Mod: CPTII,,, | Performed by: PHYSICIAN ASSISTANT

## 2024-01-31 PROCEDURE — 3078F DIAST BP <80 MM HG: CPT | Mod: CPTII,,, | Performed by: PHYSICIAN ASSISTANT

## 2024-01-31 PROCEDURE — 80053 COMPREHEN METABOLIC PANEL: CPT | Performed by: PHYSICIAN ASSISTANT

## 2024-01-31 PROCEDURE — 84443 ASSAY THYROID STIM HORMONE: CPT | Performed by: PHYSICIAN ASSISTANT

## 2024-01-31 PROCEDURE — 83690 ASSAY OF LIPASE: CPT | Performed by: PHYSICIAN ASSISTANT

## 2024-01-31 PROCEDURE — 99214 OFFICE O/P EST MOD 30 MIN: CPT | Mod: S$PBB,,, | Performed by: PHYSICIAN ASSISTANT

## 2024-01-31 RX ORDER — FUROSEMIDE 40 MG/1
1 TABLET ORAL DAILY
COMMUNITY
Start: 2023-10-23 | End: 2024-03-12 | Stop reason: SDUPTHER

## 2024-01-31 RX ORDER — PANTOPRAZOLE SODIUM 40 MG/1
40 TABLET, DELAYED RELEASE ORAL 2 TIMES DAILY
Qty: 60 TABLET | Refills: 11 | Status: SHIPPED | OUTPATIENT
Start: 2024-01-31 | End: 2024-02-23 | Stop reason: SDUPTHER

## 2024-01-31 RX ORDER — LOSARTAN POTASSIUM 25 MG/1
1 TABLET ORAL EVERY MORNING
COMMUNITY
Start: 2023-03-28 | End: 2024-03-12 | Stop reason: SDUPTHER

## 2024-01-31 RX ORDER — FOLIC ACID 1 MG/1
1 TABLET ORAL EVERY MORNING
COMMUNITY
Start: 2023-10-09 | End: 2024-03-12 | Stop reason: SDUPTHER

## 2024-01-31 RX ORDER — PANTOPRAZOLE SODIUM 40 MG/1
40 TABLET, DELAYED RELEASE ORAL EVERY MORNING
COMMUNITY
Start: 2023-03-28 | End: 2024-01-31 | Stop reason: DRUGHIGH

## 2024-01-31 RX ORDER — ONDANSETRON 4 MG/1
4 TABLET, FILM COATED ORAL EVERY 8 HOURS PRN
COMMUNITY
End: 2024-01-31 | Stop reason: ALTCHOICE

## 2024-01-31 RX ORDER — LANOLIN ALCOHOL/MO/W.PET/CERES
400 CREAM (GRAM) TOPICAL
COMMUNITY
Start: 2023-09-25 | End: 2024-03-12 | Stop reason: SDUPTHER

## 2024-01-31 RX ORDER — ONDANSETRON 8 MG/1
8 TABLET, ORALLY DISINTEGRATING ORAL EVERY 8 HOURS PRN
Qty: 30 TABLET | Refills: 1 | Status: SHIPPED | OUTPATIENT
Start: 2024-01-31 | End: 2024-03-01

## 2024-01-31 RX ORDER — PREGABALIN 225 MG/1
225 CAPSULE ORAL
COMMUNITY
Start: 2023-09-25 | End: 2024-05-06

## 2024-01-31 RX ORDER — FLUTICASONE PROPIONATE 50 MCG
2 SPRAY, SUSPENSION (ML) NASAL
COMMUNITY
End: 2024-03-12 | Stop reason: SDUPTHER

## 2024-01-31 RX ORDER — ATORVASTATIN CALCIUM 40 MG/1
40 TABLET, FILM COATED ORAL NIGHTLY
COMMUNITY
Start: 2023-03-28 | End: 2024-03-12 | Stop reason: SDUPTHER

## 2024-01-31 NOTE — PROGRESS NOTES
Subjective:      Patient ID: Marcie Bazan is a 58 y.o. female.    Chief Complaint: Emesis    Nausea  This is a recurrent problem. The current episode started 1 to 4 weeks ago. The problem occurs intermittently. The problem has been gradually worsening. Associated symptoms include nausea and vomiting. Pertinent negatives include no abdominal pain, anorexia, arthralgias, change in bowel habit, chest pain, chills, congestion, coughing, diaphoresis, fatigue, fever, headaches, joint swelling, myalgias, neck pain, numbness, rash, sore throat, swollen glands, urinary symptoms, vertigo, visual change or weakness. Treatments tried: PPI. The treatment provided mild relief.   Wakes up in the middle of the night choking and vomiting. Also happens during the day. Just starts choking randomly during the day.   Sleeps with cpap nightly.     EGD done 9/14/2023 normal. Swallow study was normal.  Seen by ENT at [facility] and pt states that her exam was normal. No recommendations.     NO difficulties breathing.   NO difficulties eating or drinking (no difficulties swallowing).     Last week symptoms were daily.   Patient Active Problem List   Diagnosis    Allergic rhinitis    Anemia of chronic disease    Erosive esophagitis    Essential hypertension    History of abnormal cervical Pap smear    History of Helicobacter pylori infection    Lipoma    Multinodular thyroid    Diuretic-induced hypokalemia    Hypomagnesemia    Thrombocytopenia    Liver replaced by transplant    Immunosuppression    At risk for opportunistic infections    Long-term use of immunosuppressant medication    Vitamin D deficiency    Mixed hyperlipidemia    Adhesive capsulitis of both shoulders    Piriformis syndrome of both sides    Spondylosis of lumbar region without myelopathy or radiculopathy    Class 2 severe obesity due to excess calories with serious comorbidity and body mass index (BMI) of 36.0 to 36.9 in adult    Sacroiliac joint dysfunction    Acute stress  reaction    Hypersomnia    BRANNON on CPAP    Gastroesophageal reflux disease with esophagitis without hemorrhage    Piriformis muscle pain    Long term current use of loop diuretic    Iron deficiency anemia    Iron deficiency anemia due to chronic blood loss    Decreased strength, endurance, and mobility    Knee pain, bilateral    Choking    Decreased hearing of both ears    Occipital neuralgia of left side    COVID    Chronic constipation    Chronic nausea    Prediabetes         Current Outpatient Medications:     amitriptyline (ELAVIL) 25 MG tablet, TAKE 1 TO 2 TABLETS(25 TO 50 MG) BY MOUTH EVERY NIGHT AS NEEDED FOR INSOMNIA OR PAIN, Disp: 60 tablet, Rfl: 0    atorvastatin (LIPITOR) 40 MG tablet, Take 1 tablet (40 mg total) by mouth every evening., Disp: 90 tablet, Rfl: 2    atorvastatin (LIPITOR) 40 MG tablet, 40 mg every evening., Disp: , Rfl:     cetirizine 10 mg Cap, cetirizine Take 1 time per day No date recorded capsule 1 time per day No route recorded No set duration recorded No set duration amount recorded active 10 mg, Disp: , Rfl:     DULoxetine (CYMBALTA) 30 MG capsule, TAKE 1 CAPSULE(30 MG) BY MOUTH EVERY DAY, Disp: 30 capsule, Rfl: 2    ergocalciferol (ERGOCALCIFEROL) 50,000 unit Cap, TAKE 1 CAPSULE BY MOUTH EVERY 7 DAYS, Disp: 12 capsule, Rfl: 2    famotidine (PEPCID) 20 MG tablet, Take 1 tablet (20 mg total) by mouth 2 (two) times a day., Disp: 180 tablet, Rfl: 3    fluticasone propionate (FLONASE) 50 mcg/actuation nasal spray, SHAKE LIQUID AND USE 1 SPRAY(50 MCG) IN EACH NOSTRIL EVERY DAY, Disp: 16 g, Rfl: 3    fluticasone propionate (FLONASE) 50 mcg/actuation nasal spray, 2 sprays by Each Nostril route., Disp: , Rfl:     folic acid (FOLVITE) 1 MG tablet, Take 1 tablet (1 mg total) by mouth once daily., Disp: 90 tablet, Rfl: 3    folic acid (FOLVITE) 1 MG tablet, Take 1 tablet by mouth every morning., Disp: , Rfl:     furosemide (LASIX) 40 MG tablet, Take 1 tablet (40 mg total) by mouth once daily.,  Disp: 90 tablet, Rfl: 1    furosemide (LASIX) 40 MG tablet, Take 1 tablet by mouth once daily., Disp: , Rfl:     levocetirizine (XYZAL) 5 MG tablet, Take 1 tablet (5 mg total) by mouth every evening., Disp: 30 tablet, Rfl: 11    lisinopriL (PRINIVIL,ZESTRIL) 5 MG tablet, Take 1 tablet by mouth every morning., Disp: , Rfl:     losartan (COZAAR) 25 MG tablet, Take 1 tablet (25 mg total) by mouth once daily., Disp: 90 tablet, Rfl: 3    losartan (COZAAR) 25 MG tablet, Take 1 tablet by mouth every morning., Disp: , Rfl:     magnesium oxide (MAG-OX) 400 mg (241.3 mg magnesium) tablet, TAKE 1 TABLET BY MOUTH TWICE DAILY, Disp: 60 tablet, Rfl: 2    magnesium oxide (MAG-OX) 400 mg (241.3 mg magnesium) tablet, Take 400 mg by mouth., Disp: , Rfl:     natrexone tablet 4.5 mg, Take 1 tablet (4.5 mg total) by mouth every evening., Disp: 30 tablet, Rfl: 2    NIFEdipine (PROCARDIA-XL) 60 MG (OSM) 24 hr tablet, TAKE 1 TABLET(60 MG) BY MOUTH EVERY EVENING, Disp: 90 tablet, Rfl: 3    ofloxacin (OCUFLOX) 0.3 % ophthalmic solution, Place 1 drop into the right eye 3 (three) times daily., Disp: 5 mL, Rfl: 3    potassium chloride (KLOR-CON) 10 MEQ TbSR, TAKE 1 TABLET(10 MEQ) BY MOUTH TWICE DAILY, Disp: 180 tablet, Rfl: 0    prednisolon/gatiflox/bromfenac (PREDNISOL ACE-GATIFLOX-BROMFEN) 1-0.5-0.075 % DrpS, Apply 1 drop to eye 3 (three) times daily., Disp: 5 mL, Rfl: 3    prednisoLONE acetate (PRED FORTE) 1 % DrpS, Place 1 drop into the right eye 3 (three) times daily., Disp: 5 mL, Rfl: 3    prednisoLONE acetate (PRED FORTE) 1 % DrpS, Place 1 drop into the left eye 3 (three) times daily., Disp: 5 mL, Rfl: 3    pregabalin (LYRICA) 225 MG Cap, TAKE 1 CAPSULE(225 MG) BY MOUTH TWICE DAILY Strength: 225 mg, Disp: 60 capsule, Rfl: 1    pregabalin (LYRICA) 225 MG Cap, Take 225 mg by mouth., Disp: , Rfl:     tacrolimus (PROGRAF) 1 MG Cap, Take 2 capsules (2 mg total) by mouth every 12 (twelve) hours., Disp: 120 capsule, Rfl: 11    traMADoL (ULTRAM)  50 mg tablet, TAKE 1/2 TO 1 TABLET BY MOUTH EVERY 8 HOURS AS NEEDED FOR PAIN, Disp: 14 tablet, Rfl: 0    traMADoL (ULTRAM) 50 mg tablet, Take 0.5 tablets (25 mg total) by mouth every 6 (six) hours., Disp: 10 tablet, Rfl: 0    ondansetron (ZOFRAN-ODT) 8 MG TbDL, Take 1 tablet (8 mg total) by mouth every 8 (eight) hours as needed (nausea)., Disp: 30 tablet, Rfl: 1    pantoprazole (PROTONIX) 40 MG tablet, Take 1 tablet (40 mg total) by mouth 2 (two) times daily., Disp: 60 tablet, Rfl: 11  No current facility-administered medications for this visit.    Facility-Administered Medications Ordered in Other Visits:     balanced salt irrigation intra-ocular solution 1 drop, 1 drop, Left Eye, On Call Procedure, Kim Mullen MD    ondansetron injection 4 mg, 4 mg, Intravenous, Once PRN, Ras Caballero MD    ondansetron injection 4 mg, 4 mg, Intravenous, Once PRN, Ras Caballero MD    phenylephrine HCL 10% ophthalmic solution 1 drop, 1 drop, Left Eye, PRN, Kim Mullen MD    proparacaine 0.5 % ophthalmic solution 1 drop, 1 drop, Left Eye, PRN, Kim Mullen MD    sodium chloride 0.9% flush 2 mL, 2 mL, Intravenous, PRN, Kim Mullen MD    TETRAcaine HCl (PF) 0.5 % Drop 1 drop, 1 drop, Left Eye, PRN, Kim Mullen MD    Review of Systems   Constitutional:  Negative for chills, diaphoresis, fatigue and fever.   HENT:  Negative for congestion and sore throat.    Respiratory:  Negative for cough.    Cardiovascular:  Negative for chest pain.   Gastrointestinal:  Positive for nausea and vomiting. Negative for abdominal pain, anorexia and change in bowel habit.   Musculoskeletal:  Negative for arthralgias, joint swelling, myalgias and neck pain.   Skin:  Negative for rash.   Neurological:  Negative for vertigo, weakness, numbness and headaches.     Objective:   /70 (BP Location: Left arm, Patient Position: Sitting, BP Method: Large (Manual))   Pulse 92   Temp 98.4 °F (36.9 °C) (Tympanic)   Ht  "5' 5" (1.651 m)   Wt 96.6 kg (212 lb 15.4 oz)   LMP 01/01/1985 (Approximate) Comment: 1985  SpO2 97%   BMI 35.44 kg/m²     Physical Exam  Vitals reviewed.   Constitutional:       General: She is not in acute distress.     Appearance: Normal appearance. She is well-developed. She is not ill-appearing, toxic-appearing or diaphoretic.   HENT:      Head: Normocephalic and atraumatic.      Right Ear: External ear normal.      Left Ear: External ear normal.      Nose: Nose normal.   Eyes:      Conjunctiva/sclera: Conjunctivae normal.      Pupils: Pupils are equal, round, and reactive to light.   Neck:      Thyroid: Thyromegaly present.      Comments: Nodule palpated on thyroid  Cardiovascular:      Rate and Rhythm: Normal rate and regular rhythm.      Heart sounds: Normal heart sounds. No murmur heard.     No friction rub. No gallop.   Pulmonary:      Effort: Pulmonary effort is normal. No respiratory distress.      Breath sounds: Normal breath sounds. No wheezing or rales.   Chest:      Chest wall: No tenderness.   Abdominal:      General: There is no distension.      Palpations: Abdomen is soft. There is no mass.      Tenderness: There is no abdominal tenderness. There is no guarding or rebound.      Hernia: No hernia is present.   Musculoskeletal:         General: Normal range of motion.      Cervical back: Normal range of motion and neck supple.   Lymphadenopathy:      Cervical: No cervical adenopathy.   Skin:     General: Skin is warm and dry.      Capillary Refill: Capillary refill takes less than 2 seconds.      Findings: No rash.   Neurological:      Mental Status: She is alert and oriented to person, place, and time.      Motor: No weakness.      Coordination: Coordination normal.      Gait: Gait normal.   Psychiatric:         Mood and Affect: Mood normal.         Behavior: Behavior normal.         Thought Content: Thought content normal.         Judgment: Judgment normal.         Assessment:     1. BRANNON " (obstructive sleep apnea)    2. Nausea and vomiting, unspecified vomiting type    3. Choking, subsequent encounter    4. Gastroesophageal reflux disease, unspecified whether esophagitis present    5. Thyroid nodule      Plan:   BRANNON (obstructive sleep apnea)  -     Ambulatory referral/consult to Pulmonology; Future; Expected date: 02/07/2024    Nausea and vomiting, unspecified vomiting type  -     CBC Auto Differential; Future; Expected date: 01/31/2024  -     Comprehensive Metabolic Panel; Future; Expected date: 01/31/2024  -     TSH; Future; Expected date: 01/31/2024  -     Lipase; Future; Expected date: 01/31/2024  -     Ambulatory referral/consult to Gastroenterology  -     ondansetron (ZOFRAN-ODT) 8 MG TbDL; Take 1 tablet (8 mg total) by mouth every 8 (eight) hours as needed (nausea).  Dispense: 30 tablet; Refill: 1  -     pantoprazole (PROTONIX) 40 MG tablet; Take 1 tablet (40 mg total) by mouth 2 (two) times daily.  Dispense: 60 tablet; Refill: 11    Choking, subsequent encounter  -     Ambulatory referral/consult to Gastroenterology  -     Ambulatory referral/consult to Pulmonology; Future; Expected date: 02/07/2024    Gastroesophageal reflux disease, unspecified whether esophagitis present  -     pantoprazole (PROTONIX) 40 MG tablet; Take 1 tablet (40 mg total) by mouth 2 (two) times daily.  Dispense: 60 tablet; Refill: 11    Thyroid nodule  -     US Soft Tissue Head Neck Thyroid; Future; Expected date: 01/31/2024    -increase protonix to BID  Needs to get in to see GI. Will work on external referral since unable to get her in to see a GI at [facility].     -will also get her a follow up with the sleep dept.       Follow up if symptoms worsen or fail to improve.

## 2024-02-01 LAB — TSH SERPL DL<=0.005 MIU/L-ACNC: 1.16 UIU/ML (ref 0.4–4)

## 2024-02-02 ENCOUNTER — HOSPITAL ENCOUNTER (OUTPATIENT)
Dept: RADIOLOGY | Facility: HOSPITAL | Age: 59
Discharge: HOME | End: 2024-02-02
Payer: MEDICAID

## 2024-02-02 DIAGNOSIS — M54.16 LUMBAR RADICULOPATHY: ICD-10-CM

## 2024-02-02 DIAGNOSIS — M54.12 CERVICAL RADICULOPATHY: ICD-10-CM

## 2024-02-02 PROCEDURE — 72141 MRI NECK SPINE W/O DYE: CPT | Mod: TC

## 2024-02-02 PROCEDURE — 72141 MRI NECK SPINE W/O DYE: CPT | Mod: 26,,, | Performed by: RADIOLOGY

## 2024-02-02 PROCEDURE — 72148 MRI LUMBAR SPINE W/O DYE: CPT | Mod: 26,,, | Performed by: RADIOLOGY

## 2024-02-02 PROCEDURE — 72148 MRI LUMBAR SPINE W/O DYE: CPT | Mod: TC

## 2024-02-12 ENCOUNTER — PATIENT MESSAGE (OUTPATIENT)
Dept: INTERNAL MEDICINE | Facility: CLINIC | Age: 59
End: 2024-02-12
Payer: MEDICAID

## 2024-02-15 ENCOUNTER — HOSPITAL ENCOUNTER (OUTPATIENT)
Dept: RADIOLOGY | Facility: HOSPITAL | Age: 59
Discharge: HOME | End: 2024-02-15
Attending: PHYSICIAN ASSISTANT
Payer: MEDICAID

## 2024-02-15 DIAGNOSIS — E04.1 THYROID NODULE: ICD-10-CM

## 2024-02-15 PROCEDURE — 76536 US EXAM OF HEAD AND NECK: CPT | Mod: TC

## 2024-02-15 PROCEDURE — 76536 US EXAM OF HEAD AND NECK: CPT | Mod: 26,,, | Performed by: RADIOLOGY

## 2024-02-20 DIAGNOSIS — M54.16 BILATERAL LUMBAR RADICULOPATHY: ICD-10-CM

## 2024-02-20 RX ORDER — PREGABALIN 225 MG/1
CAPSULE ORAL
Qty: 60 CAPSULE | Refills: 1 | Status: SHIPPED | OUTPATIENT
Start: 2024-02-20 | End: 2024-05-01 | Stop reason: SDUPTHER

## 2024-02-20 NOTE — TELEPHONE ENCOUNTER
Pt is requesting for a refill of: pregabalin (LYRICA) 225 MG Cap   Last filed:10/25/23   Last encounter: 9/29/23   Up coming apt: Follow up: 5/06/24     Pharmacy:[facility] #[id number]   Is this something you can do?

## 2024-02-23 DIAGNOSIS — R11.2 NAUSEA AND VOMITING, UNSPECIFIED VOMITING TYPE: ICD-10-CM

## 2024-02-23 DIAGNOSIS — G89.29 INSOMNIA SECONDARY TO CHRONIC PAIN: ICD-10-CM

## 2024-02-23 DIAGNOSIS — G47.01 INSOMNIA SECONDARY TO CHRONIC PAIN: ICD-10-CM

## 2024-02-23 DIAGNOSIS — K21.9 GASTROESOPHAGEAL REFLUX DISEASE, UNSPECIFIED WHETHER ESOPHAGITIS PRESENT: ICD-10-CM

## 2024-02-23 RX ORDER — AMITRIPTYLINE HYDROCHLORIDE 25 MG/1
TABLET, FILM COATED ORAL
Qty: 60 TABLET | Refills: 0 | Status: SHIPPED | OUTPATIENT
Start: 2024-02-23 | End: 2024-04-26 | Stop reason: SDUPTHER

## 2024-02-23 NOTE — TELEPHONE ENCOUNTER
Good Morning/Afternoon,     Pt is requesting for a refill of: Amitriptyline 25 mg  Last filed: 12/27/23  Last encounter: 9/29/23  Up coming apt: 5/6/24  Pharmacy: [facility] #[id number]  Is this something you can do?      Hilary Shi  Medical Assistant

## 2024-02-26 ENCOUNTER — PATIENT MESSAGE (OUTPATIENT)
Dept: INTERNAL MEDICINE | Facility: CLINIC | Age: 59
End: 2024-02-26
Payer: MEDICAID

## 2024-02-26 RX ORDER — PANTOPRAZOLE SODIUM 40 MG/1
40 TABLET, DELAYED RELEASE ORAL 2 TIMES DAILY
Qty: 60 TABLET | Refills: 11 | Status: SHIPPED | OUTPATIENT
Start: 2024-02-26 | End: 2024-02-29 | Stop reason: SDUPTHER

## 2024-02-29 ENCOUNTER — TELEPHONE (OUTPATIENT)
Dept: INTERNAL MEDICINE | Facility: CLINIC | Age: 59
End: 2024-02-29

## 2024-02-29 ENCOUNTER — OFFICE VISIT (OUTPATIENT)
Dept: INTERNAL MEDICINE | Facility: CLINIC | Age: 59
End: 2024-02-29
Payer: MEDICAID

## 2024-02-29 VITALS
BODY MASS INDEX: 36.03 KG/M2 | RESPIRATION RATE: 18 BRPM | OXYGEN SATURATION: 97 % | WEIGHT: 216.5 LBS | HEART RATE: 88 BPM | TEMPERATURE: 98 F | DIASTOLIC BLOOD PRESSURE: 84 MMHG | SYSTOLIC BLOOD PRESSURE: 136 MMHG

## 2024-02-29 DIAGNOSIS — R11.2 NAUSEA AND VOMITING, UNSPECIFIED VOMITING TYPE: ICD-10-CM

## 2024-02-29 DIAGNOSIS — K21.9 GASTROESOPHAGEAL REFLUX DISEASE, UNSPECIFIED WHETHER ESOPHAGITIS PRESENT: ICD-10-CM

## 2024-02-29 PROCEDURE — 3079F DIAST BP 80-89 MM HG: CPT | Mod: CPTII,,, | Performed by: PHYSICIAN ASSISTANT

## 2024-02-29 PROCEDURE — 99999 PR PBB SHADOW E&M-EST. PATIENT-LVL V: CPT | Mod: PBBFAC,,, | Performed by: PHYSICIAN ASSISTANT

## 2024-02-29 PROCEDURE — 4010F ACE/ARB THERAPY RXD/TAKEN: CPT | Mod: CPTII,,, | Performed by: PHYSICIAN ASSISTANT

## 2024-02-29 PROCEDURE — 1159F MED LIST DOCD IN RCRD: CPT | Mod: CPTII,,, | Performed by: PHYSICIAN ASSISTANT

## 2024-02-29 PROCEDURE — 99213 OFFICE O/P EST LOW 20 MIN: CPT | Mod: S$PBB,,, | Performed by: PHYSICIAN ASSISTANT

## 2024-02-29 PROCEDURE — 99215 OFFICE O/P EST HI 40 MIN: CPT | Mod: PBBFAC | Performed by: PHYSICIAN ASSISTANT

## 2024-02-29 PROCEDURE — 3075F SYST BP GE 130 - 139MM HG: CPT | Mod: CPTII,,, | Performed by: PHYSICIAN ASSISTANT

## 2024-02-29 PROCEDURE — 3008F BODY MASS INDEX DOCD: CPT | Mod: CPTII,,, | Performed by: PHYSICIAN ASSISTANT

## 2024-02-29 PROCEDURE — 1160F RVW MEDS BY RX/DR IN RCRD: CPT | Mod: CPTII,,, | Performed by: PHYSICIAN ASSISTANT

## 2024-02-29 RX ORDER — PANTOPRAZOLE SODIUM 40 MG/1
40 TABLET, DELAYED RELEASE ORAL 2 TIMES DAILY
Qty: 180 TABLET | Refills: 3 | Status: SHIPPED | OUTPATIENT
Start: 2024-02-29 | End: 2024-03-12 | Stop reason: SDUPTHER

## 2024-02-29 NOTE — TELEPHONE ENCOUNTER
----- Message from Calvin Shi sent at 2/29/2024  3:07 PM CST -----  Contact: self  Pt is asking for an return call in reference to checking status of paper work ,please call back at .[phone] Thx CJ

## 2024-02-29 NOTE — PATIENT INSTRUCTIONS
Diagnosis was discussed with patient. Advised patient to eat more fiber (whole grains, vegetables, fresh fruit, nuts and legumes (beans). Fluid intake is key. Drink at least 8 glasses of water daily. Minimize milk, dairy, caffeine, tea, and alcohol.   Exercise helps improve the working of your colon. This helps ease constipation. Try to get some activity every day. If you havent been active for a while, talk to your doctor before starting again.  Avoid chronic laxative use. Some laxatives stimulate the colon to work more quickly. Using these laxatives too much may keep the colon from working right without them. Dont use stimulant laxative without talking your healthcare provider first.  Try sugar free Metamucil (Psyllium).     If experience acute severe abdominal pain, chills, fever, or worsening of symptoms. Go to the ER.

## 2024-02-29 NOTE — TELEPHONE ENCOUNTER
Pt informed that paperwork has been found and in process of being filled out. Pt also told that once completed we will call her.

## 2024-02-29 NOTE — PROGRESS NOTES
Subjective:      Patient ID: Marcie Bazan is a 58 y.o. female.    Chief Complaint: Emesis and Medication Refill    Emesis   Pertinent negatives include no abdominal pain, arthralgias, chest pain, chills, coughing, diarrhea, dizziness, fever, headaches or myalgias.   Medication Refill  Associated symptoms include vomiting. Pertinent negatives include no abdominal pain, arthralgias, chest pain, chills, congestion, coughing, diaphoresis, fatigue, fever, headaches, joint swelling, myalgias, nausea, numbness, rash, sore throat or weakness.     Here today to get her protonix. Pt states that her insurance would only cover once daily instead of twice daily. Pt states that she intermittently chokes. Choking occasionally leads to her vomiting.   Swallow study/egd normal. Saw ENT at [facility] and exam was normal.   Pt states that the protonix twice daily did help with her symptoms but has not been on it lately because she ran out of the rx.   She has been unable to get in with GI. She has been calling around to find one in network with her insurance.   Also taking pepcid twice daily.     Patient Active Problem List   Diagnosis    Allergic rhinitis    Anemia of chronic disease    Erosive esophagitis    Essential hypertension    History of abnormal cervical Pap smear    History of Helicobacter pylori infection    Lipoma    Multinodular thyroid    Diuretic-induced hypokalemia    Hypomagnesemia    Thrombocytopenia    Liver replaced by transplant    Immunosuppression    At risk for opportunistic infections    Long-term use of immunosuppressant medication    Vitamin D deficiency    Mixed hyperlipidemia    Adhesive capsulitis of both shoulders    Piriformis syndrome of both sides    Spondylosis of lumbar region without myelopathy or radiculopathy    Class 2 severe obesity due to excess calories with serious comorbidity and body mass index (BMI) of 36.0 to 36.9 in adult    Sacroiliac joint dysfunction    Acute stress reaction     Hypersomnia    BRANNON on CPAP    Gastroesophageal reflux disease with esophagitis without hemorrhage    Piriformis muscle pain    Long term current use of loop diuretic    Iron deficiency anemia    Iron deficiency anemia due to chronic blood loss    Decreased strength, endurance, and mobility    Knee pain, bilateral    Choking    Decreased hearing of both ears    Occipital neuralgia of left side    COVID    Chronic constipation    Chronic nausea    Prediabetes         Current Outpatient Medications:     amitriptyline (ELAVIL) 25 MG tablet, TAKE 1 TO 2 TABLETS(25 TO 50 MG) BY MOUTH EVERY NIGHT AS NEEDED FOR INSOMNIA OR PAIN, Disp: 60 tablet, Rfl: 0    atorvastatin (LIPITOR) 40 MG tablet, Take 1 tablet (40 mg total) by mouth every evening., Disp: 90 tablet, Rfl: 2    cetirizine 10 mg Cap, cetirizine Take 1 time per day No date recorded capsule 1 time per day No route recorded No set duration recorded No set duration amount recorded active 10 mg, Disp: , Rfl:     DULoxetine (CYMBALTA) 30 MG capsule, TAKE 1 CAPSULE(30 MG) BY MOUTH EVERY DAY, Disp: 30 capsule, Rfl: 2    ergocalciferol (ERGOCALCIFEROL) 50,000 unit Cap, TAKE 1 CAPSULE BY MOUTH EVERY 7 DAYS, Disp: 12 capsule, Rfl: 2    famotidine (PEPCID) 20 MG tablet, Take 1 tablet (20 mg total) by mouth 2 (two) times a day., Disp: 180 tablet, Rfl: 3    fluticasone propionate (FLONASE) 50 mcg/actuation nasal spray, SHAKE LIQUID AND USE 1 SPRAY(50 MCG) IN EACH NOSTRIL EVERY DAY, Disp: 16 g, Rfl: 3    folic acid (FOLVITE) 1 MG tablet, Take 1 tablet (1 mg total) by mouth once daily., Disp: 90 tablet, Rfl: 3    folic acid (FOLVITE) 1 MG tablet, Take 1 tablet by mouth every morning., Disp: , Rfl:     furosemide (LASIX) 40 MG tablet, Take 1 tablet (40 mg total) by mouth once daily., Disp: 90 tablet, Rfl: 1    levocetirizine (XYZAL) 5 MG tablet, Take 1 tablet (5 mg total) by mouth every evening., Disp: 30 tablet, Rfl: 11    lisinopriL (PRINIVIL,ZESTRIL) 5 MG tablet, Take 1 tablet by  mouth every morning., Disp: , Rfl:     losartan (COZAAR) 25 MG tablet, Take 1 tablet (25 mg total) by mouth once daily., Disp: 90 tablet, Rfl: 3    magnesium oxide (MAG-OX) 400 mg (241.3 mg magnesium) tablet, TAKE 1 TABLET BY MOUTH TWICE DAILY, Disp: 60 tablet, Rfl: 2    NIFEdipine (PROCARDIA-XL) 60 MG (OSM) 24 hr tablet, TAKE 1 TABLET(60 MG) BY MOUTH EVERY EVENING, Disp: 90 tablet, Rfl: 3    ondansetron (ZOFRAN-ODT) 8 MG TbDL, Take 1 tablet (8 mg total) by mouth every 8 (eight) hours as needed (nausea)., Disp: 30 tablet, Rfl: 1    potassium chloride (KLOR-CON) 10 MEQ TbSR, TAKE 1 TABLET(10 MEQ) BY MOUTH TWICE DAILY, Disp: 180 tablet, Rfl: 0    prednisoLONE acetate (PRED FORTE) 1 % DrpS, Place 1 drop into the left eye 3 (three) times daily., Disp: 5 mL, Rfl: 3    pregabalin (LYRICA) 225 MG Cap, TAKE 1 CAPSULE BY MOUTH TWICE DAILY, Disp: 60 capsule, Rfl: 1    tacrolimus (PROGRAF) 1 MG Cap, Take 2 capsules (2 mg total) by mouth every 12 (twelve) hours., Disp: 120 capsule, Rfl: 11    traMADoL (ULTRAM) 50 mg tablet, Take 0.5 tablets (25 mg total) by mouth every 6 (six) hours., Disp: 10 tablet, Rfl: 0    atorvastatin (LIPITOR) 40 MG tablet, 40 mg every evening., Disp: , Rfl:     fluticasone propionate (FLONASE) 50 mcg/actuation nasal spray, 2 sprays by Each Nostril route., Disp: , Rfl:     furosemide (LASIX) 40 MG tablet, Take 1 tablet by mouth once daily., Disp: , Rfl:     losartan (COZAAR) 25 MG tablet, Take 1 tablet by mouth every morning., Disp: , Rfl:     magnesium oxide (MAG-OX) 400 mg (241.3 mg magnesium) tablet, Take 400 mg by mouth., Disp: , Rfl:     natrexone tablet 4.5 mg, Take 1 tablet (4.5 mg total) by mouth every evening. (Patient not taking: Reported on 2/29/2024), Disp: 30 tablet, Rfl: 2    ofloxacin (OCUFLOX) 0.3 % ophthalmic solution, Place 1 drop into the right eye 3 (three) times daily. (Patient not taking: Reported on 2/29/2024), Disp: 5 mL, Rfl: 3    pantoprazole (PROTONIX) 40 MG tablet, Take 1  tablet (40 mg total) by mouth 2 (two) times daily., Disp: 180 tablet, Rfl: 3    prednisolon/gatiflox/bromfenac (PREDNISOL ACE-GATIFLOX-BROMFEN) 1-0.5-0.075 % DrpS, Apply 1 drop to eye 3 (three) times daily. (Patient not taking: Reported on 2/29/2024), Disp: 5 mL, Rfl: 3    prednisoLONE acetate (PRED FORTE) 1 % DrpS, Place 1 drop into the right eye 3 (three) times daily. (Patient not taking: Reported on 2/29/2024), Disp: 5 mL, Rfl: 3    pregabalin (LYRICA) 225 MG Cap, Take 225 mg by mouth., Disp: , Rfl:     traMADoL (ULTRAM) 50 mg tablet, TAKE 1/2 TO 1 TABLET BY MOUTH EVERY 8 HOURS AS NEEDED FOR PAIN (Patient not taking: Reported on 2/29/2024), Disp: 14 tablet, Rfl: 0  No current facility-administered medications for this visit.    Facility-Administered Medications Ordered in Other Visits:     balanced salt irrigation intra-ocular solution 1 drop, 1 drop, Left Eye, On Call Procedure, Kim Mullen MD    ondansetron injection 4 mg, 4 mg, Intravenous, Once PRN, Ras Caballero MD    ondansetron injection 4 mg, 4 mg, Intravenous, Once PRN, Ras Caballero MD    phenylephrine HCL 10% ophthalmic solution 1 drop, 1 drop, Left Eye, PRN, Kim Mullen MD    proparacaine 0.5 % ophthalmic solution 1 drop, 1 drop, Left Eye, PRN, Kim Mullen MD    sodium chloride 0.9% flush 2 mL, 2 mL, Intravenous, PRN, Kim Mullen MD    TETRAcaine HCl (PF) 0.5 % Drop 1 drop, 1 drop, Left Eye, PRN, Kim Mullen MD    Review of Systems   Constitutional:  Negative for activity change, appetite change, chills, diaphoresis, fatigue, fever and unexpected weight change.   HENT: Negative.  Negative for congestion, hearing loss, postnasal drip, rhinorrhea, sore throat, trouble swallowing and voice change.    Eyes: Negative.  Negative for visual disturbance.   Respiratory: Negative.  Negative for cough, choking, chest tightness and shortness of breath.    Cardiovascular:  Negative for chest pain, palpitations and leg  swelling.   Gastrointestinal:  Positive for vomiting. Negative for abdominal distention, abdominal pain, blood in stool, constipation, diarrhea, nausea and rectal pain.   Endocrine: Negative for cold intolerance, heat intolerance, polydipsia and polyuria.   Genitourinary: Negative.  Negative for difficulty urinating and frequency.   Musculoskeletal:  Negative for arthralgias, back pain, gait problem, joint swelling and myalgias.   Skin:  Negative for color change, pallor, rash and wound.   Neurological:  Negative for dizziness, tremors, weakness, light-headedness, numbness and headaches.   Hematological:  Negative for adenopathy.   Psychiatric/Behavioral:  Negative for behavioral problems, confusion, self-injury, sleep disturbance and suicidal ideas. The patient is not nervous/anxious.      Objective:   /84 (BP Location: Right arm, Patient Position: Sitting, BP Method: Large (Manual))   Pulse 88   Temp 98.4 °F (36.9 °C) (Tympanic)   Resp 18   Wt 98.2 kg (216 lb 7.9 oz)   LMP 01/01/1985 (Approximate) Comment: 1985  SpO2 97%   BMI 36.03 kg/m²     Physical Exam  Vitals and nursing note reviewed.   Constitutional:       General: She is not in acute distress.     Appearance: Normal appearance. She is well-developed. She is not ill-appearing, toxic-appearing or diaphoretic.   HENT:      Head: Normocephalic and atraumatic.   Cardiovascular:      Rate and Rhythm: Normal rate and regular rhythm.      Heart sounds: Normal heart sounds. No murmur heard.     No friction rub. No gallop.   Pulmonary:      Effort: Pulmonary effort is normal. No respiratory distress.      Breath sounds: Normal breath sounds. No wheezing or rales.   Abdominal:      General: There is no distension.      Palpations: There is no mass.      Tenderness: There is no abdominal tenderness. There is no guarding.   Musculoskeletal:         General: Normal range of motion.   Skin:     General: Skin is warm.      Capillary Refill: Capillary refill  takes less than 2 seconds.      Findings: No rash.   Neurological:      Mental Status: She is alert and oriented to person, place, and time.      Motor: No weakness.      Gait: Gait normal.   Psychiatric:         Mood and Affect: Mood normal.         Behavior: Behavior normal.         Thought Content: Thought content normal.         Judgment: Judgment normal.         Assessment:     1. Nausea and vomiting, unspecified vomiting type    2. Gastroesophageal reflux disease, unspecified whether esophagitis present      Plan:   Nausea and vomiting, unspecified vomiting type  -     pantoprazole (PROTONIX) 40 MG tablet; Take 1 tablet (40 mg total) by mouth 2 (two) times daily.  Dispense: 180 tablet; Refill: 3    Gastroesophageal reflux disease, unspecified whether esophagitis present  -     pantoprazole (PROTONIX) 40 MG tablet; Take 1 tablet (40 mg total) by mouth 2 (two) times daily.  Dispense: 180 tablet; Refill: 3      Follow up if symptoms worsen or fail to improve.

## 2024-03-04 ENCOUNTER — PATIENT MESSAGE (OUTPATIENT)
Dept: INTERNAL MEDICINE | Facility: CLINIC | Age: 59
End: 2024-03-04
Payer: MEDICAID

## 2024-03-04 DIAGNOSIS — G89.4 CHRONIC PAIN SYNDROME: ICD-10-CM

## 2024-03-05 RX ORDER — DULOXETIN HYDROCHLORIDE 30 MG/1
30 CAPSULE, DELAYED RELEASE ORAL
Qty: 30 CAPSULE | Refills: 2 | Status: SHIPPED | OUTPATIENT
Start: 2024-03-05 | End: 2024-05-06 | Stop reason: SDUPTHER

## 2024-03-12 ENCOUNTER — LAB VISIT (OUTPATIENT)
Dept: LAB | Facility: HOSPITAL | Age: 59
End: 2024-03-12
Attending: INTERNAL MEDICINE
Payer: MEDICAID

## 2024-03-12 ENCOUNTER — OFFICE VISIT (OUTPATIENT)
Dept: INTERNAL MEDICINE | Facility: CLINIC | Age: 59
End: 2024-03-12
Payer: MEDICAID

## 2024-03-12 VITALS
OXYGEN SATURATION: 98 % | HEIGHT: 63 IN | SYSTOLIC BLOOD PRESSURE: 130 MMHG | DIASTOLIC BLOOD PRESSURE: 70 MMHG | TEMPERATURE: 98 F | BODY MASS INDEX: 37.77 KG/M2 | HEART RATE: 83 BPM | WEIGHT: 213.19 LBS

## 2024-03-12 DIAGNOSIS — E87.6 DIURETIC-INDUCED HYPOKALEMIA: Chronic | ICD-10-CM

## 2024-03-12 DIAGNOSIS — K21.00 GASTROESOPHAGEAL REFLUX DISEASE WITH ESOPHAGITIS WITHOUT HEMORRHAGE: Chronic | ICD-10-CM

## 2024-03-12 DIAGNOSIS — E78.2 MIXED HYPERLIPIDEMIA: Chronic | ICD-10-CM

## 2024-03-12 DIAGNOSIS — G47.33 OSA ON CPAP: Primary | Chronic | ICD-10-CM

## 2024-03-12 DIAGNOSIS — Z94.4 LIVER REPLACED BY TRANSPLANT: ICD-10-CM

## 2024-03-12 DIAGNOSIS — E55.9 VITAMIN D DEFICIENCY: Chronic | ICD-10-CM

## 2024-03-12 DIAGNOSIS — I10 ESSENTIAL HYPERTENSION: Chronic | ICD-10-CM

## 2024-03-12 DIAGNOSIS — E53.8 FOLIC ACID DEFICIENCY: Chronic | ICD-10-CM

## 2024-03-12 DIAGNOSIS — M25.572 ACUTE LEFT ANKLE PAIN: ICD-10-CM

## 2024-03-12 DIAGNOSIS — R09.82 POSTNASAL DRIP: Chronic | ICD-10-CM

## 2024-03-12 DIAGNOSIS — T50.2X5A DIURETIC-INDUCED HYPOKALEMIA: Chronic | ICD-10-CM

## 2024-03-12 DIAGNOSIS — R60.0 BILATERAL LEG EDEMA: Chronic | ICD-10-CM

## 2024-03-12 LAB
ALBUMIN SERPL BCP-MCNC: 3.9 G/DL (ref 3.5–5.2)
ALP SERPL-CCNC: 189 U/L (ref 55–135)
ALT SERPL W/O P-5'-P-CCNC: 9 U/L (ref 10–44)
ANION GAP SERPL CALC-SCNC: 9 MMOL/L (ref 8–16)
AST SERPL-CCNC: 10 U/L (ref 10–40)
BASOPHILS # BLD AUTO: 0.04 K/UL (ref 0–0.2)
BASOPHILS NFR BLD: 0.4 % (ref 0–1.9)
BILIRUB SERPL-MCNC: 0.6 MG/DL (ref 0.1–1)
BUN SERPL-MCNC: 11 MG/DL (ref 6–20)
CALCIUM SERPL-MCNC: 9.1 MG/DL (ref 8.7–10.5)
CHLORIDE SERPL-SCNC: 103 MMOL/L (ref 95–110)
CO2 SERPL-SCNC: 29 MMOL/L (ref 23–29)
CREAT SERPL-MCNC: 0.8 MG/DL (ref 0.5–1.4)
DIFFERENTIAL METHOD BLD: ABNORMAL
EOSINOPHIL # BLD AUTO: 0.3 K/UL (ref 0–0.5)
EOSINOPHIL NFR BLD: 2.9 % (ref 0–8)
ERYTHROCYTE [DISTWIDTH] IN BLOOD BY AUTOMATED COUNT: 16 % (ref 11.5–14.5)
EST. GFR  (NO RACE VARIABLE): >60 ML/MIN/1.73 M^2
GLUCOSE SERPL-MCNC: 94 MG/DL (ref 70–110)
HCT VFR BLD AUTO: 43.8 % (ref 37–48.5)
HGB BLD-MCNC: 13.5 G/DL (ref 12–16)
IMM GRANULOCYTES # BLD AUTO: 0.03 K/UL (ref 0–0.04)
IMM GRANULOCYTES NFR BLD AUTO: 0.3 % (ref 0–0.5)
LYMPHOCYTES # BLD AUTO: 2.5 K/UL (ref 1–4.8)
LYMPHOCYTES NFR BLD: 25.5 % (ref 18–48)
MCH RBC QN AUTO: 25 PG (ref 27–31)
MCHC RBC AUTO-ENTMCNC: 30.8 G/DL (ref 32–36)
MCV RBC AUTO: 81 FL (ref 82–98)
MONOCYTES # BLD AUTO: 0.5 K/UL (ref 0.3–1)
MONOCYTES NFR BLD: 5.4 % (ref 4–15)
NEUTROPHILS # BLD AUTO: 6.5 K/UL (ref 1.8–7.7)
NEUTROPHILS NFR BLD: 65.5 % (ref 38–73)
NRBC BLD-RTO: 0 /100 WBC
PLATELET # BLD AUTO: 63 K/UL (ref 150–450)
PMV BLD AUTO: 12.3 FL (ref 9.2–12.9)
POTASSIUM SERPL-SCNC: 4.1 MMOL/L (ref 3.5–5.1)
PROT SERPL-MCNC: 7.3 G/DL (ref 6–8.4)
RBC # BLD AUTO: 5.39 M/UL (ref 4–5.4)
SODIUM SERPL-SCNC: 141 MMOL/L (ref 136–145)
URATE SERPL-MCNC: 5.7 MG/DL (ref 2.4–5.7)
WBC # BLD AUTO: 9.87 K/UL (ref 3.9–12.7)

## 2024-03-12 PROCEDURE — 1159F MED LIST DOCD IN RCRD: CPT | Mod: CPTII,,, | Performed by: FAMILY MEDICINE

## 2024-03-12 PROCEDURE — 99999 PR PBB SHADOW E&M-EST. PATIENT-LVL III: CPT | Mod: PBBFAC,,, | Performed by: FAMILY MEDICINE

## 2024-03-12 PROCEDURE — 99213 OFFICE O/P EST LOW 20 MIN: CPT | Mod: PBBFAC | Performed by: FAMILY MEDICINE

## 2024-03-12 PROCEDURE — 84550 ASSAY OF BLOOD/URIC ACID: CPT | Performed by: FAMILY MEDICINE

## 2024-03-12 PROCEDURE — 36415 COLL VENOUS BLD VENIPUNCTURE: CPT | Performed by: INTERNAL MEDICINE

## 2024-03-12 PROCEDURE — 3008F BODY MASS INDEX DOCD: CPT | Mod: CPTII,,, | Performed by: FAMILY MEDICINE

## 2024-03-12 PROCEDURE — 82977 ASSAY OF GGT: CPT | Performed by: INTERNAL MEDICINE

## 2024-03-12 PROCEDURE — 99214 OFFICE O/P EST MOD 30 MIN: CPT | Mod: S$PBB,,, | Performed by: FAMILY MEDICINE

## 2024-03-12 PROCEDURE — 3078F DIAST BP <80 MM HG: CPT | Mod: CPTII,,, | Performed by: FAMILY MEDICINE

## 2024-03-12 PROCEDURE — 80197 ASSAY OF TACROLIMUS: CPT | Performed by: INTERNAL MEDICINE

## 2024-03-12 PROCEDURE — 4010F ACE/ARB THERAPY RXD/TAKEN: CPT | Mod: CPTII,,, | Performed by: FAMILY MEDICINE

## 2024-03-12 PROCEDURE — 85025 COMPLETE CBC W/AUTO DIFF WBC: CPT | Performed by: INTERNAL MEDICINE

## 2024-03-12 PROCEDURE — 1160F RVW MEDS BY RX/DR IN RCRD: CPT | Mod: CPTII,,, | Performed by: FAMILY MEDICINE

## 2024-03-12 PROCEDURE — 3075F SYST BP GE 130 - 139MM HG: CPT | Mod: CPTII,,, | Performed by: FAMILY MEDICINE

## 2024-03-12 PROCEDURE — 80053 COMPREHEN METABOLIC PANEL: CPT | Performed by: INTERNAL MEDICINE

## 2024-03-12 RX ORDER — LEVOCETIRIZINE DIHYDROCHLORIDE 5 MG/1
5 TABLET, FILM COATED ORAL NIGHTLY
Qty: 30 TABLET | Refills: 3 | Status: SHIPPED | OUTPATIENT
Start: 2024-03-12 | End: 2025-03-12

## 2024-03-12 RX ORDER — LOSARTAN POTASSIUM 25 MG/1
25 TABLET ORAL DAILY
Qty: 90 TABLET | Refills: 3 | Status: SHIPPED | OUTPATIENT
Start: 2024-03-12 | End: 2024-04-22

## 2024-03-12 RX ORDER — FAMOTIDINE 20 MG/1
20 TABLET, FILM COATED ORAL 2 TIMES DAILY
Qty: 180 TABLET | Refills: 3 | Status: SHIPPED | OUTPATIENT
Start: 2024-03-12 | End: 2024-04-29

## 2024-03-12 RX ORDER — FLUTICASONE PROPIONATE 50 MCG
2 SPRAY, SUSPENSION (ML) NASAL DAILY
Qty: 16 G | Refills: 11 | Status: SHIPPED | OUTPATIENT
Start: 2024-03-12

## 2024-03-12 RX ORDER — ERGOCALCIFEROL 1.25 MG/1
50000 CAPSULE ORAL
Qty: 12 CAPSULE | Refills: 3 | Status: SHIPPED | OUTPATIENT
Start: 2024-03-12

## 2024-03-12 RX ORDER — POTASSIUM CHLORIDE 750 MG/1
10 TABLET, EXTENDED RELEASE ORAL 2 TIMES DAILY
Qty: 180 TABLET | Refills: 3 | Status: SHIPPED | OUTPATIENT
Start: 2024-03-12 | End: 2024-05-28 | Stop reason: SDUPTHER

## 2024-03-12 RX ORDER — FUROSEMIDE 40 MG/1
40 TABLET ORAL DAILY
Qty: 90 TABLET | Refills: 3 | Status: SHIPPED | OUTPATIENT
Start: 2024-03-12

## 2024-03-12 RX ORDER — NIFEDIPINE 60 MG/1
60 TABLET, EXTENDED RELEASE ORAL DAILY
Qty: 90 TABLET | Refills: 3 | Status: SHIPPED | OUTPATIENT
Start: 2024-03-12

## 2024-03-12 RX ORDER — FOLIC ACID 1 MG/1
1 TABLET ORAL DAILY
Qty: 90 TABLET | Refills: 3 | Status: SHIPPED | OUTPATIENT
Start: 2024-03-12 | End: 2025-03-12

## 2024-03-12 RX ORDER — PANTOPRAZOLE SODIUM 40 MG/1
40 TABLET, DELAYED RELEASE ORAL 2 TIMES DAILY
Qty: 180 TABLET | Refills: 3 | Status: SHIPPED | OUTPATIENT
Start: 2024-03-12 | End: 2024-05-03 | Stop reason: SDUPTHER

## 2024-03-12 RX ORDER — ATORVASTATIN CALCIUM 40 MG/1
40 TABLET, FILM COATED ORAL NIGHTLY
Qty: 90 TABLET | Refills: 3 | Status: SHIPPED | OUTPATIENT
Start: 2024-03-12 | End: 2025-03-12

## 2024-03-12 NOTE — PROGRESS NOTES
OFFICE VISIT 3/12/24  9:40 AM CDT    History of Present Illness    Marcie presents today for follow-up of chronic conditions and medication refills.    Marcie reports experiencing random choking during sleep causing her to wake up. She has had a chronic cough at night which she suspects is linked to her use of the CPAP machine for sleep apnea. Additionally, nocturnal choking occurs even when not using the CPAP. Trey CPAP machine is around five years old.    She describes an infrequent pain that occurs with increased movement in her left ankle which had an onset without a preceding traumatic event. She also reports daily pain on her right side which has started to move up into her right arm which is particularly severe during her sleep.    She complains of daily, all-day fatigue and is actively seeking assistance for an energy boost. She is taking Folic Acid which she hopes might improve her energy levels.    She manages her nasal allergies and postnasal drip with Flonase and Levocetirizine, reporting these medications as effective in controlling her symptoms.    She takes Pantoprazole and Famotidine twice daily for acid reflux, and reports the combination of these drugs to be very effective. Past medical evaluations for her swallowing issues such as a swallowing study, esophagram, and upper endoscopy have yielded normal results other than the identification of a small hiatal hernia.    Marcie is presently taking Atorvastatin for high cholesterol, Tacrolimus post liver transplant, and Losartan for blood pressure, all without reporting any adverse reactions or complications. A recent increase in Losartan dosage has shown some improvement apart from a challenging period two days ago.    She continues to experience aggravations in her leg edema. Incident of difficulty in standing from a swollen ankle has been reported although other pain related to edema was not raised. She presumes her edema might be associated with her  medication Nifedipine.    She reports taking Vitamin D and Folic Acid supplements.    She uses Ondansetron occasionally to handle bouts of nausea.    Post-surgical intervention, she received infusions in both eyes but confirms they were not recurring and treatments have concluded. She carries a known diagnosis of multinodular goiter and recent thyroid ultrasound results confirm it is stable with no evidence of cancer.         Assessment & Plan    SLEEP APNEA:   Recommend the patient to bring their CPAP machine to the upcoming Sleep Clinic appointment for evaluation of sleep apnea symptoms, such as waking up feeling like choking.  ALLERGIES:   Instructed on the correct technique for using Flonase nasal spray, advising an increase to two sprays per nostril daily if one spray is not controlling allergies.  MEDICATION MANAGEMENT:   Prescribed refills for the patient's current medications, including atorvastatin, pantoprazole, famotidine, Flonase, levothyroxine, Lasix, blood pressure medicines, vitamin D supplement, and folic acid supplement.   Explained the role of folic acid in blood formation and nervous system function, and clarified that nifedipine, a blood pressure medication, can cause ankle swelling as a side effect.  POTENTIAL GOUT:   Ordered a blood test to check uric acid levels for potential gout-related ankle pain.   Scheduled a video visit to discuss treatment options if uric acid levels indicate a problem.  ENT FOLLOW-UP:   Suggested a follow-up with ENT if symptoms persist.  NEXT APPOINTMENT:   Scheduled the next appointment for February of the following year.       1. BRANNON on CPAP  Overview:  >>OVERVIEW FOR HYPERSOMNIA WRITTEN ON 9/11/2020 11:17 AM BY KATJA ROSE MD    Home Sleep Studies   Date/Time: 7/15/2020 8:00 AM  Performed by: Chandra Toney MD  Authorized by: Jarrod Roberto MD   PHYSICIAN INTERPRETATION AND COMMENTS: Findings are consistent with primary snoring. very mild sleep apnea only  by RDI criteria. RDI was 13.0 events per hour. AHI was 4.0 events per hour. Oxygen saturation shayla was 86.1%. Loud snoring. Surgical procedures to ameliorate snoring may be considered. If suspicion for sleep disorder breathing is sufficient then repeat testing is indicated. Weight loss also indicated.  CLINICAL HISTORY: 54 year old female presented with: 13 inch neck, BMI of 30, an Traer sleepiness score of 12, history of hypertension and symptoms of nocturnal snoring, waking up choking and witnessed apneas. Based on the clinical history, the patient has a high pre-test probability of having mild BRANNON. Stop Bang score 5.      2. Mixed hyperlipidemia  -     atorvastatin (LIPITOR) 40 MG tablet; Take 1 tablet (40 mg total) by mouth every evening.  Dispense: 90 tablet; Refill: 3    3. Gastroesophageal reflux disease with esophagitis without hemorrhage  -     famotidine (PEPCID) 20 MG tablet; Take 1 tablet (20 mg total) by mouth 2 (two) times a day.  Dispense: 180 tablet; Refill: 3  -     pantoprazole (PROTONIX) 40 MG tablet; Take 1 tablet (40 mg total) by mouth 2 (two) times daily. insurance only cover 1 tablet per day  Dispense: 180 tablet; Refill: 3    4. Postnasal drip  -     fluticasone propionate (FLONASE) 50 mcg/actuation nasal spray; 2 sprays (100 mcg total) by Each Nostril route once daily.  Dispense: 16 g; Refill: 11  -     levocetirizine (XYZAL) 5 MG tablet; Take 1 tablet (5 mg total) by mouth every evening.  Dispense: 30 tablet; Refill: 3    5. Folic acid deficiency  -     folic acid (FOLVITE) 1 MG tablet; Take 1 tablet (1 mg total) by mouth once daily.  Dispense: 90 tablet; Refill: 3    6. Bilateral leg edema  -     furosemide (LASIX) 40 MG tablet; Take 1 tablet (40 mg total) by mouth once daily.  Dispense: 90 tablet; Refill: 3    7. Essential hypertension  -     losartan (COZAAR) 25 MG tablet; Take 1 tablet (25 mg total) by mouth once daily.  Dispense: 90 tablet; Refill: 3  -     NIFEdipine (PROCARDIA-XL)  "60 MG (OSM) 24 hr tablet; Take 1 tablet (60 mg total) by mouth once daily.  Dispense: 90 tablet; Refill: 3    8. Diuretic-induced hypokalemia  -     potassium chloride (KLOR-CON) 10 MEQ TbSR; Take 1 tablet (10 mEq total) by mouth 2 (two) times daily.  Dispense: 180 tablet; Refill: 3    9. Acute left ankle pain  -     Uric Acid; Future; Expected date: 03/12/2024    10. Vitamin D deficiency  -     ergocalciferol (ERGOCALCIFEROL) 50,000 unit Cap; Take 1 capsule (50,000 Units total) by mouth every 7 days.  Dispense: 12 capsule; Refill: 3       Unless noted herein, any chronic conditions are represented as and appear stable, and no other significant complaints or concerns were reported.    Vitals:    03/12/24 0901   BP: 130/70   BP Location: Right arm   Patient Position: Sitting   BP Method: Large (Manual)   Pulse: 83   Temp: 97.8 °F (36.6 °C)   TempSrc: Tympanic   SpO2: 98%   Weight: 96.7 kg (213 lb 3 oz)   Height: 5' 3" (1.6 m)   Physical Exam  Physical Exam    Constitutional: No acute distress. Normal appearance. Not ill-appearing.  Pulmonary: Pulmonary effort is normal. No respiratory distress.  Skin: Skin is not jaundiced.  Neurological: Alert. Mental status is at baseline.  Psychiatric: Mood normal. Behavior normal. Thought content normal.  Mouth: Tongue appears normal.         This note was generated with the assistance of ambient listening technology. Verbal consent was obtained by the patient and accompanying visitor(s) for the recording of patient appointment to facilitate this note. I attest to having reviewed and edited the generated note for accuracy, though some syntax or spelling errors may persist. Please contact the author of this note for any clarification.    Documentation entered by me for this encounter may have been done in part using speech-recognition technology. Although I have made an effort to ensure accuracy, "sound like" errors may exist and should be interpreted in context.   "

## 2024-03-13 ENCOUNTER — PATIENT MESSAGE (OUTPATIENT)
Dept: SLEEP MEDICINE | Facility: CLINIC | Age: 59
End: 2024-03-13
Payer: MEDICAID

## 2024-03-13 LAB
GGT SERPL-CCNC: 19 U/L (ref 8–55)
TACROLIMUS BLD-MCNC: 8.6 NG/ML (ref 5–15)

## 2024-03-14 ENCOUNTER — TELEPHONE (OUTPATIENT)
Dept: TRANSPLANT | Facility: CLINIC | Age: 59
End: 2024-03-14
Payer: MEDICAID

## 2024-03-14 DIAGNOSIS — R79.89 ELEVATED LIVER FUNCTION TESTS: ICD-10-CM

## 2024-03-14 DIAGNOSIS — Z94.4 LIVER REPLACED BY TRANSPLANT: Primary | ICD-10-CM

## 2024-03-14 NOTE — TELEPHONE ENCOUNTER
Letter sent to patient stating: Your labs have been reviewed by your Transplant physician, no action required. Next labs due 6/10/2024          ----- Message from Joselin Wallace MD sent at 3/13/2024  9:02 PM CDT -----  Reviewed, nothing to do; repeat per routine

## 2024-03-14 NOTE — LETTER
March 14, 2024    Marcie Bazan  [address] [facility] [address]          Dear Marcie Bazan:  MRN: [id number]    This is a follow up to your recent labs, your lab results were stable.  There are no medicine changes.  Please have your labs drawn again on 6/10/2024.      If you cannot have your labs drawn on the scheduled date, it is your responsibility to call the transplant department to reschedule.  Please call [phone] and ask to speak to Mica PURVIS -  for all scheduling requests.     Sincerely,    Viola SANDHUN, RN      Your Liver Transplant Coordinator    [facility]  [address] [place] [id number] [phone]

## 2024-04-02 ENCOUNTER — OFFICE VISIT (OUTPATIENT)
Dept: ENDOCRINOLOGY | Facility: CLINIC | Age: 59
End: 2024-04-02
Payer: MEDICAID

## 2024-04-02 DIAGNOSIS — E04.2 MULTINODULAR THYROID: Primary | ICD-10-CM

## 2024-04-02 DIAGNOSIS — Z94.4 LIVER REPLACED BY TRANSPLANT: ICD-10-CM

## 2024-04-02 DIAGNOSIS — E05.90 HYPERTHYROIDISM: ICD-10-CM

## 2024-04-02 DIAGNOSIS — R79.89 ABNORMAL THYROID BLOOD TEST: ICD-10-CM

## 2024-04-02 PROCEDURE — 1160F RVW MEDS BY RX/DR IN RCRD: CPT | Mod: CPTII,95,, | Performed by: HOSPITALIST

## 2024-04-02 PROCEDURE — 4010F ACE/ARB THERAPY RXD/TAKEN: CPT | Mod: CPTII,95,, | Performed by: HOSPITALIST

## 2024-04-02 PROCEDURE — 1159F MED LIST DOCD IN RCRD: CPT | Mod: CPTII,95,, | Performed by: HOSPITALIST

## 2024-04-02 PROCEDURE — 99204 OFFICE O/P NEW MOD 45 MIN: CPT | Mod: 95,,, | Performed by: HOSPITALIST

## 2024-04-02 NOTE — ASSESSMENT & PLAN NOTE
- Independently reviewed ultrasound images with the patient 2024 and 2022 and 2020  - Noted multiple thyroid nodules with the 2 left and right nodules fitting criteria for FNA given size  - TSH reviewed: stable, reassurance given to patient about thyroid function  - I have reviewed management options including observation, FNA or surgery.  - Discussed indications for a FNA and FNA techniques were explained, AVS/handout given  - Discussed possible FNA results (benign, FLUS/AUS, follicular or hurthle lesion, suspicious for cancer, cancer and non diagnostic).  - Reviewed that a non diagnostic or FLUS/AUS would require a repeat FNA/genetic testing   - With all this info and all questions were answered: at this time, patient is comfortable in pursing FNA of Left and Right nodules  - Will set up FNA, per patient schedule, with [facility]  - Pending FNA will determine further work up vs monitoring  - Follow up with yearly thyroid ultrasound recommended with PCP

## 2024-04-02 NOTE — PROGRESS NOTES
Subjective:      Patient ID: Marcie Bazan is a 58 y.o. female presented to [facility] on 4/2/2024.  Chief complaint:  No chief complaint on file.    History of Present illness: Marcie Bazan is a 58 y.o. female here for  multiple thyroid nodules, abnormal thyroid bloodwork  Other significant past medical history:  Liver transplant 2017    Interval history:  Sent by primary care for evaluation of abnormal thyroid function.  Suppressed TSH normal free T4 10/2023.  With subsequent repeat lab work results.  Most recent TSH done 1/31/2023>>1.15.  Normal free T4 TPO negative   No symptoms of hyperthyroidism.  Continue having issue choking  Hx multiple thyroid nodules never had FNA.         1) Abnormal thyroid bloodwork  - Noted 1 time on lab work done 10/6/2023, suppressed TSH with normal free T4>> checked by PCP new  - Not on supplemement:  Iodine supplements, biotin, Seamoss  - No recent contrast exposure  - With subsequent repeat lab work results.  Most recent TSH done 1/31/2023>>1.15.  Normal free T4 TPO negative   - Family history of thyroid problem: yes, cousin?  - Family history of thyroid cancer: no    Thyroid lab work  Lab Results   Component Value Date    TSH 1.157 01/31/2024    TSH 1.702 11/07/2023    TSH <0.010 (L) 10/06/2023    FREET4 1.00 11/07/2023    FREET4 1.05 10/06/2023    FREET4 0.99 12/22/2020    F2MQNBC 90 11/07/2023      Antibodies  Lab Results   Component Value Date    THYROPEROXID <6.0 11/07/2023           2) Multiple thyroid nodules   - Chronic in nature, 1st noted in 2020, seen by [place] [facility] by Dr Navarro  - No recent contrast exposure  - With subsequent repeat lab work results.  Most recent TSH done 1/31/2023>>1.15.  Normal free T4 TPO negative   - Family history of thyroid problem: yes, cousin?  - Family history of thyroid cancer: no  - most recent ultrasound 2024: patient with 1 right thyroid nodule and 1 left thyroid nodule, consistent in size  when compared to previous ultrasound over the years    US thyroid  02/15/2024  Right lobe: 4.0 x 1.2 x 1.4 cm  Left lobe: 3.9 x 1.4 x 1.4 cm  Isthmus: 0.2 cm  Total thyroid volume: 7 c  Echotexture: Similar to prior  Nodules: Right lobe nodule similar to prior, largest 1.8 cm at the lower pole.  Left lobe nodules demonstrate no detrimental change, largest 1.3 cm.    Impression:  Multinodular thyroid without detrimental change.          04/17/2023  Right thyroid lobe 4.6 x 1.2 x 1.7 cm.  Left thyroid lobe 3.5 x 1.2 x 1.3 cm.  Isthmus 0.3 cm.  Right thyroid cyst 7 mm.  Isoechoic right thyroid nodule 1.9 cm in length.  Isoechoic right thyroid nodule 8 mm.  Left thyroid lobe isoechoic nodule 1.9 cm.  Left thyroid lobe isoechoic nodule 1.5 cm.     Impression:  Multiple TR 3 nodules bilaterally measuring up to 2 cm on the right and 1.9 cm on the left.  Multiple nodules demonstrate a few mm of increased size compared to prior exams.  This could represent benign normally expected benign nodular growth over time.  No other significant interval change.  Continued surveillance recommended.    The patient's medications, allergies, past medical, surgical, social and family histories were reviewed and updated as appropriate.  Review of Systems: pertinent positives as per the above HPI, and otherwise negative.    Objective:   LMP 01/01/1985 (Approximate) Comment: 1985  There is no height or weight on file to calculate BMI.  Vital signs reviewed    Physical Exam  Constitutional:       Comments: Physical exam and Vital signs were not done, as this is a virtual visit.       Labs reviewed:  See results in subjective  Lab Results   Component Value Date    HGBA1C 5.7 (H) 10/06/2023     Lab Results   Component Value Date    CHOL 127 04/17/2023    HDL 40 04/17/2023    LDLCALC 66.2 04/17/2023    TRIG 104 04/17/2023    CHOLHDL 31.5 04/17/2023     Lab Results   Component Value Date     03/12/2024    K 4.1 03/12/2024      03/12/2024    CO2 29 03/12/2024    GLU 94 03/12/2024    BUN 11 03/12/2024    CREATININE 0.8 03/12/2024    CALCIUM 9.1 03/12/2024    PHOS 2.7 02/27/2020    PROT 7.3 03/12/2024    ALBUMIN 3.9 03/12/2024    BILITOT 0.6 03/12/2024    ALKPHOS 189 (H) 03/12/2024    AST 10 03/12/2024    ALT 9 (L) 03/12/2024    ANIONGAP 9 03/12/2024    EGFRNORACEVR >60.0 03/12/2024    TSH 1.157 01/31/2024    BDMLEDKN62UG 34 10/06/2023     Assessment     1. Multinodular thyroid  US FNA Thyroid, 1st Lesion    US FNA Thyroid Ea Addl Lesion      2. Hyperthyroidism  Ambulatory referral/consult to Endocrinology      3. Liver replaced by transplant        4. Abnormal thyroid blood test           Plan     Multinodular thyroid  - Independently reviewed ultrasound images with the patient 2024 and 2022 and 2020  - Noted multiple thyroid nodules with the 2 left and right nodules fitting criteria for FNA given size  - TSH reviewed: stable, reassurance given to patient about thyroid function  - I have reviewed management options including observation, FNA or surgery.  - Discussed indications for a FNA and FNA techniques were explained, AVS/handout given  - Discussed possible FNA results (benign, FLUS/AUS, follicular or hurthle lesion, suspicious for cancer, cancer and non diagnostic).  - Reviewed that a non diagnostic or FLUS/AUS would require a repeat FNA/genetic testing   - With all this info and all questions were answered: at this time, patient is comfortable in pursing FNA of Left and Right nodules  - Will set up FNA, per patient schedule, with [facility]  - Pending FNA will determine further work up vs monitoring  - Follow up with yearly thyroid ultrasound recommended with PCP    Liver replaced by transplant  - per liver transplant team    Abnormal thyroid blood test   - Noted 1 time on lab work done 10/6/2023, suppressed TSH with normal free T4>> checked by PCP  - Not on supplemement:  Iodine supplements, biotin, Seamoss. No  recent contrast exposure  - With subsequent repeat lab work results.  Most recent TSH done 1/31/2023>>1.15.  Normal free T4 TPO negative   - No treatment noted  - recommend yearly TSH/FT4 with PCP    Advised patient to follow up with PCP for routine health maintenance care.   RTC as needed    Sarkis Romero M.D.  Endocrinology  [facility]  4/2/2024      Disclaimer: This note has been generated in part with the use of voice-recognition software. There may be typographical errors that have been missed during proof-reading.

## 2024-04-04 ENCOUNTER — PATIENT MESSAGE (OUTPATIENT)
Dept: ENDOCRINOLOGY | Facility: CLINIC | Age: 59
End: 2024-04-04
Payer: MEDICAID

## 2024-04-04 ENCOUNTER — TELEPHONE (OUTPATIENT)
Dept: ENDOCRINOLOGY | Facility: CLINIC | Age: 59
End: 2024-04-04
Payer: MEDICAID

## 2024-04-04 NOTE — TELEPHONE ENCOUNTER
----- Message from Kylah Burgos MA sent at 4/4/2024  1:52 PM CDT -----  Done thank you   ----- Message -----  From: Sarkis Romero MD  Sent: 4/2/2024   1:15 PM CDT  To: Kylah Burgos MA    Can you schedule R and L thyroid nodules for this patient, in the afternoon for her, thanks!

## 2024-04-05 NOTE — PROGRESS NOTES
Physical Therapy Daily Treatment Note     Name: Marcie Bazan  Clinic Number: [id number]    Therapy Diagnosis:   Encounter Diagnoses   Name Primary?    Chronic bilateral low back pain with right-sided sciatica     Difficulty walking     Muscle weakness (generalized)      Physician: Antonio Horan, *    Visit Date: 6/19/2019    Physician Orders: PT EVAL and TREAT  Medical Diagnosis: bilateral greater trochanteric bursitis, OA lumbar spine, SI pain  Evaluation Date: 3/26/2019  Authorization Period Expiration:9/19/2019  Plan of Care Certification Period: 5/26/2019  Visit #/Visits authorized: 11/ 20     Time In: 9:00  Time Out: 10:10  Total Billable Time: 29 minutes    Precautions: Standard, Immunosuppression and organ transplant    Subjective     Pt reports: she got a cortisone shot in her lumbar spine on 6/14/2019.  She feels like it went well and she has had some pain relief.  Patient reports feeling good again today.  She has been able to do more and move more.  She had 0/10 when she walked in today.  After she walked on the treadmill for 7 minutes, patient reports 5/10 pain in right LE.  .  She was compliant with home exercise program.  Response to previous treatment: Patient was having increased pain and needed a steroid shot.  Functional change: Patient is now able to move more and do more since steroid shot on 6/14/2019.    Pain: 5/10  Location: right lower legs and upper legs     Objective     Lumbar AROM:  Flexion: Finger tips to toes (painful returning to neutral)  Extension: 10 degrees (painful)  Right Sidebending: Hand to Fibular Head  Left Sidebending: Hand to Fibular Head  Right Rotation: 60 degrees  Left Rotation: 60 degrees    LE MMT:  Hip Flexion: 3/5 right, 4+/5 left  Knee Extension: 3/5 right, 4+/5 left  Knee Flexion: 3/5 bilateral  Ankle Dorsiflexion: 4/5 right, 5/5 left    MODIFIED OSWESTRY LOW BACK PAIN DISABILITY QUESTIONNAIRE - The following scores are patient-reported and range from  Discharge instructions reviewed with patient and visitor. Handouts given & verbalized understanding with no further questions at this time.  spoke to pt at bedside, reviewed procedure and findings, answered questions. Made aware they are awaiting biopsy results with MD telephone number provided per AVS sheet. VSS on RA, no pain or nausea noted, tolerating po fluids, no complaints noted. Fall precautions reviewed, consents in chart, PIV removed at this time.   0-5, with 0 being least impaired and 5 being most impaired.    Section 1- Pain intensity    Score 4/5   Section 2- Person care  Score 3/5   Section 3 Lifting- Optional  Score 4/5     Section 4  Walking  Score 1/5  Section 5 Sitting   Score 2/5   Section 6 Standing  Score 2/5  Section 7 Sleeping  Score 1/5   Section 8 Social Life   Score 2/5  Section 9 Traveling  Score 2/5   Section 10 Employ/home  Score 2/5    Patient reports 46% disability on the Modified Oswestry Low Back Pain Disability Questionnaire.    LOWER EXTREMITY FUNCTIONAL SCALE    Patient-reported functional assessment scores are rated as follows:  A score of 0/4 represents extreme difficulty or unable to perform the activity. A score of 1/4 represents quite a bit of difficulty. A score of 2/4 represents moderate difficulty. A score of 3/4 represents a little bit of difficulty. A score of 4/4 represents no difficulty.                EVAL   1. Any of your usual work, housework or school activities   2/4  2. Your usual hobbies, sporting     1/4  3. Getting in and out of tub      2/4  4. Walking between rooms      4/4  5. Putting on shoes or socks      3/4  6. Squatting        1/4  7. Lifting an object from the ground      2/4  8. Performing light activities around the home   3/4  9. Performing heavy activities around the home   1/4  10. Getting in and out of car      3/4  11. Walking 2 blocks       1/4  12.Walking a mile       0/4  13. Getting up and down 1 flight of stairs    0/4  14. Standing for 1 hour      2/4  15. Sitting for an hour       2/4  16. Running on even ground      0/4  17. Running on uneven ground     0/4  18. Making sharp turns when running fast    0/4  19. Hopping        0/4  20. Rolling over in bed       4/4    Patient reports 38.75% ability based on score of the Lower Extremity Functional Scale.         Marcie received therapeutic exercises to develop strength, endurance, ROM, flexibility, posture and core stabilization for 15 minutes  of supervised and 9 minutes of direct 1:1 including:   Walking on Treadmill: 0 incline, 0.9 mph, 7 minutes (for endurance, LE strength and erect posture)  Single Knee to Chest: 2 minutes each (4 minutes total)  Piriformis Stretch: 2 minutes each (4 minutes total)  Abdominal Bracing: 3 minutes  Gluteal Sets: 3 minutes  Double Knee to Chest with Swiss Ball with core activation: 3 minutes    Marcie received the following manual therapy techniques: Manual traction, Myofacial release and Soft tissue Mobilization were applied to the: lumbar spine, bilateral LEs for 20 minutes, including:  Lumbar Distraction with Sheet, bilateral long leg distraction, STM/MFR to bilateral lumbar paraspinals, quadratus lumborum, gluteus medius, gluteus minimus, piriformis, IT band and hamstring.        Home Exercises Provided and Patient Education Provided      Education provided:   - Patient educated on only performing pain free activity and not moving into extreme range of motion.  Patient also educated on taking rest breaks.    Written Home Exercises Provided: Patient instructed to cont prior HEP.  Exercises were reviewed and Marcie was able to demonstrate them prior to the end of the session.  Marcie demonstrated good  understanding of the education provided.     See EMR under Patient Instructions for exercises provided prior visit.    Assessment     See updated POC in POC.    Marcie is progressing well towards her goals.   Pt prognosis is Good.     Pt will continue to benefit from skilled outpatient physical therapy to address the deficits listed in the problem list box on initial evaluation, provide pt/family education and to maximize pt's level of independence in the home and community environment.     Pt's spiritual, cultural and educational needs considered and pt agreeable to plan of care and goals.     Anticipated barriers to physical therapy: poor body awareness, coping style, poor carry over.      Plan     See updated POC in  POC.    Laura Patrick, PT, DPT

## 2024-04-09 ENCOUNTER — PATIENT MESSAGE (OUTPATIENT)
Dept: INTERNAL MEDICINE | Facility: CLINIC | Age: 59
End: 2024-04-09
Payer: MEDICAID

## 2024-04-17 ENCOUNTER — PATIENT MESSAGE (OUTPATIENT)
Dept: INTERNAL MEDICINE | Facility: CLINIC | Age: 59
End: 2024-04-17
Payer: MEDICAID

## 2024-04-18 ENCOUNTER — HOSPITAL ENCOUNTER (OUTPATIENT)
Dept: RADIOLOGY | Facility: HOSPITAL | Age: 59
Discharge: HOME | End: 2024-04-18
Payer: MEDICAID

## 2024-04-18 DIAGNOSIS — R29.2 HYPERREFLEXIA: ICD-10-CM

## 2024-04-18 DIAGNOSIS — M54.6 THORACIC BACK PAIN: ICD-10-CM

## 2024-04-18 DIAGNOSIS — G95.9 MYELOPATHY: ICD-10-CM

## 2024-04-18 PROCEDURE — 25500020 PHARM REV CODE 255

## 2024-04-18 PROCEDURE — 70553 MRI BRAIN STEM W/O & W/DYE: CPT | Mod: TC

## 2024-04-18 PROCEDURE — 70553 MRI BRAIN STEM W/O & W/DYE: CPT | Mod: 26,,, | Performed by: RADIOLOGY

## 2024-04-18 PROCEDURE — A9585 GADOBUTROL INJECTION: HCPCS

## 2024-04-18 PROCEDURE — 72146 MRI CHEST SPINE W/O DYE: CPT | Mod: TC

## 2024-04-18 PROCEDURE — 72146 MRI CHEST SPINE W/O DYE: CPT | Mod: 26,,, | Performed by: RADIOLOGY

## 2024-04-18 RX ORDER — GADOBUTROL 604.72 MG/ML
10 INJECTION INTRAVENOUS
Status: COMPLETED | OUTPATIENT
Start: 2024-04-18 | End: 2024-04-18

## 2024-04-18 RX ADMIN — GADOBUTROL 10 ML: 604.72 INJECTION INTRAVENOUS at 01:04

## 2024-04-23 ENCOUNTER — PATIENT MESSAGE (OUTPATIENT)
Dept: ADMINISTRATIVE | Facility: OTHER | Age: 59
End: 2024-04-23
Payer: MEDICAID

## 2024-04-26 ENCOUNTER — PATIENT MESSAGE (OUTPATIENT)
Dept: INTERNAL MEDICINE | Facility: CLINIC | Age: 59
End: 2024-04-26
Payer: MEDICAID

## 2024-04-26 ENCOUNTER — TELEPHONE (OUTPATIENT)
Dept: ENDOCRINOLOGY | Facility: CLINIC | Age: 59
End: 2024-04-26
Payer: MEDICAID

## 2024-04-26 DIAGNOSIS — K21.00 GASTROESOPHAGEAL REFLUX DISEASE WITH ESOPHAGITIS WITHOUT HEMORRHAGE: Chronic | ICD-10-CM

## 2024-04-26 DIAGNOSIS — G89.29 INSOMNIA SECONDARY TO CHRONIC PAIN: ICD-10-CM

## 2024-04-26 DIAGNOSIS — G47.01 INSOMNIA SECONDARY TO CHRONIC PAIN: ICD-10-CM

## 2024-04-26 NOTE — TELEPHONE ENCOUNTER
Refill Routing Note   Medication(s) are not appropriate for processing by [facility] for the following reason(s):        New or recently adjusted medication    [facility] action(s):  Defer        Medication Therapy Plan: INS ONLY COVERS FOR 1 A DAY. WILL PEND FOR ONCE DAILY FOR YOUR REVIEW      Appointments  past 12m or future 3m with PCP    Date Provider   Last Visit   3/12/2024 ETHAN Munoz MD   Next Visit   Visit date not found ETHAN Munoz MD   ED visits in past 90 days: 0        Note composed:3:19 PM 04/26/2024

## 2024-04-26 NOTE — TELEPHONE ENCOUNTER
----- Message from Glenn Mckeon sent at 4/26/2024  8:42 AM CDT -----  Regarding: reschedule request  PATIENT CALL    Pt called regarding upcoming US + biopsy. Due to transportation issues, she needs more time to arrange a ride.  Please call back at [phone] to assist further   Gilson Jean-Baptiste is a 25 year old year old male patient here today for upper body check. Patient reports that he has moles on scalp and chin that have been growing slowly over the years and will get irritated if shaving or getting hair cut. Patient reports that he has had moles removed in the past.  Remainder of the HPI, Meds, PMH, Allergies, FH, and SH was reviewed in chart.    Pertinent Hx:   No personal history of skin cancer.   History reviewed. No pertinent past medical history.    History reviewed. No pertinent surgical history.     Family History   Problem Relation Age of Onset     Unknown/Adopted Maternal Grandmother      Unknown/Adopted Maternal Grandfather        Social History     Social History     Marital status: Single     Spouse name: N/A     Number of children: N/A     Years of education: N/A     Occupational History     Not on file.     Social History Main Topics     Smoking status: Former Smoker     Quit date: 6/13/2012     Smokeless tobacco: Not on file     Alcohol use Yes     Drug use: No     Sexual activity: Yes     Partners: Female     Other Topics Concern     Parent/Sibling W/ Cabg, Mi Or Angioplasty Before 65f 55m? No     Social History Narrative       No outpatient encounter prescriptions on file as of 4/18/2017.     No facility-administered encounter medications on file as of 4/18/2017.              Review Of Systems  Skin: As above  Eyes: negative  Ears/Nose/Throat: negative  Respiratory: No shortness of breath, dyspnea on exertion, cough, or hemoptysis  Cardiovascular: negative  Gastrointestinal: negative  Genitourinary: negative  Musculoskeletal: negative  Neurologic: negative  Psychiatric: negative  Hematologic/Lymphatic/Immunologic: negative  Endocrine: negative      O:   NAD, WDWN, Alert & Oriented, Mood & Affect wnl, Vitals stable   Here today alone   /89  Pulse 60  SpO2 100%   General appearance normal   Vitals stable   Alert, oriented and in no acute distress      0.9 cm  flesh colored papillomatous papule on right occipital scalp   1.1 cm flesh colored papillomatous papule on left frontal scalp    Brown papules and macules on face, torso, and extremities with regular pigment network and borders   Brown macules on upper torso    Flesh colored papules on back   Verrucous papule on left plantar foot       The remainder of expanded problem focused exam was unremarkable; the following areas were examined:  scalp/hair, conjunctiva/lids, face, neck, lips/teeth, chest, digits/nails, RUE, LUE.      Eyes: Conjunctivae/lids:Normal     ENT: Lips    MSK:Normal    Cardiovascular: peripheral edema none    Pulm: Breathing Normal    Neuro/Psych: Orientation:Normal; Mood/Affect:Normal    A/P:  1. R/O inflamed nevus on right occipital scalp and left frontal scalp  TANGENTIAL BIOPSY SENT OUT:  After consent, anesthesia with LEC and prep, tangential excision performed and specimen sent out for permanent section histology.  No complications and routine wound care. Patient told to call our office in 1-2 weeks for result.      2. Inflamed nevus on right chin  Discussed excision with patient, will schedule with Dr. Rodriguez.   3. Left plantar wart x 1- painful   Pared down.   LN2:  Treated with LN2 for 5s for 1-2 cycles. Warned risks of blistering, pain, pigment change, scarring, and incomplete resolution.  Advised patient to return if lesions do not completely resolve.  Wound care sheet given.  4. Benign nevi, lentigo  BENIGN LESIONS DISCUSSED WITH PATIENT:  I discussed the specifics of tumor, prognosis, and genetics of benign lesions.  I explained that treatment of these lesions would be purely cosmetic and not medically neccessary.  I discussed with patient different removal options including excision, cautery and /or laser.      Nature and genetics of benign skin lesions dicussed with patient.  Signs and Symptoms of skin cancer discussed with patient.  ABCDEs of melanoma reviewed with patient.  Patient  encouraged to perform monthly skin exams.  UV precautions reviewed with patient.  Skin care regimen reviewed with patient: Eliminate harsh soaps, i.e. Dial, zest, irsih spring; Mild soaps such as Cetaphil or Dove sensitive skin, avoid hot or cold showers, aggressive use of emollients including vanicream, cetaphil or cerave discussed with patient.    Risks of non-melanoma skin cancer discussed with patient   Return to clinic for excision with Dr. Rodriguez.

## 2024-04-28 DIAGNOSIS — K21.00 GASTROESOPHAGEAL REFLUX DISEASE WITH ESOPHAGITIS WITHOUT HEMORRHAGE: Chronic | ICD-10-CM

## 2024-04-28 NOTE — TELEPHONE ENCOUNTER
No care due was identified.  [facility] Embedded Care Due Messages. Reference number: [id number].   4/28/2024 4:10:43 AM CDT

## 2024-04-29 RX ORDER — AMITRIPTYLINE HYDROCHLORIDE 25 MG/1
TABLET, FILM COATED ORAL
Qty: 60 TABLET | Refills: 0 | Status: SHIPPED | OUTPATIENT
Start: 2024-04-29 | End: 2024-05-06 | Stop reason: SDUPTHER

## 2024-04-29 RX ORDER — FAMOTIDINE 20 MG/1
20 TABLET, FILM COATED ORAL 2 TIMES DAILY
Qty: 180 TABLET | Refills: 3 | Status: SHIPPED | OUTPATIENT
Start: 2024-04-29

## 2024-04-29 NOTE — TELEPHONE ENCOUNTER
Refill Decision Note   Marcie Bazan  is requesting a refill authorization.  Brief Assessment and Rationale for Refill:  Approve     Medication Therapy Plan:         Alert overridden per protocol: Yes   Comments:     Note composed:10:52 AM 04/29/2024

## 2024-05-01 ENCOUNTER — TELEPHONE (OUTPATIENT)
Dept: ENDOCRINOLOGY | Facility: CLINIC | Age: 59
End: 2024-05-01
Payer: MEDICAID

## 2024-05-01 ENCOUNTER — PATIENT MESSAGE (OUTPATIENT)
Dept: INTERNAL MEDICINE | Facility: CLINIC | Age: 59
End: 2024-05-01
Payer: MEDICAID

## 2024-05-01 ENCOUNTER — PATIENT MESSAGE (OUTPATIENT)
Dept: PAIN MEDICINE | Facility: CLINIC | Age: 59
End: 2024-05-01
Payer: MEDICAID

## 2024-05-01 DIAGNOSIS — K21.9 GASTROESOPHAGEAL REFLUX DISEASE, UNSPECIFIED WHETHER ESOPHAGITIS PRESENT: ICD-10-CM

## 2024-05-01 DIAGNOSIS — R11.2 NAUSEA AND VOMITING, UNSPECIFIED VOMITING TYPE: ICD-10-CM

## 2024-05-01 DIAGNOSIS — M54.16 BILATERAL LUMBAR RADICULOPATHY: ICD-10-CM

## 2024-05-01 RX ORDER — PREGABALIN 225 MG/1
CAPSULE ORAL
Qty: 60 CAPSULE | Refills: 1 | Status: SHIPPED | OUTPATIENT
Start: 2024-05-01 | End: 2024-05-06

## 2024-05-01 RX ORDER — PANTOPRAZOLE SODIUM 40 MG/1
40 TABLET, DELAYED RELEASE ORAL 2 TIMES DAILY
Qty: 180 TABLET | Refills: 3 | Status: CANCELLED | OUTPATIENT
Start: 2024-05-01 | End: 2025-05-01

## 2024-05-01 RX ORDER — PREGABALIN 225 MG/1
CAPSULE ORAL
Qty: 60 CAPSULE | OUTPATIENT
Start: 2024-05-01

## 2024-05-01 NOTE — TELEPHONE ENCOUNTER
----- Message from Claire Fischer sent at 5/1/2024  2:19 PM CDT -----  Regarding: reschedule appt 05/02  Contact: pt @ [phone]  Patient is calling to reschedule appointment on 05/02 as pt is not able to come on that day. Pt is advising that she call two weeks ago to reschedule and has not heard back as of yet.       Please call to advise further thank you for all you are doing.

## 2024-05-03 ENCOUNTER — PATIENT MESSAGE (OUTPATIENT)
Dept: INTERNAL MEDICINE | Facility: CLINIC | Age: 59
End: 2024-05-03
Payer: MEDICAID

## 2024-05-03 DIAGNOSIS — K21.00 GASTROESOPHAGEAL REFLUX DISEASE WITH ESOPHAGITIS WITHOUT HEMORRHAGE: Chronic | ICD-10-CM

## 2024-05-06 ENCOUNTER — OFFICE VISIT (OUTPATIENT)
Dept: PAIN MEDICINE | Facility: CLINIC | Age: 59
End: 2024-05-06
Payer: MEDICAID

## 2024-05-06 ENCOUNTER — TELEPHONE (OUTPATIENT)
Dept: TRANSPLANT | Facility: CLINIC | Age: 59
End: 2024-05-06
Payer: MEDICAID

## 2024-05-06 ENCOUNTER — PATIENT MESSAGE (OUTPATIENT)
Dept: INTERNAL MEDICINE | Facility: CLINIC | Age: 59
End: 2024-05-06

## 2024-05-06 DIAGNOSIS — M79.18 PIRIFORMIS MUSCLE PAIN: ICD-10-CM

## 2024-05-06 DIAGNOSIS — M50.30 DDD (DEGENERATIVE DISC DISEASE), CERVICAL: ICD-10-CM

## 2024-05-06 DIAGNOSIS — R11.2 NAUSEA AND VOMITING, UNSPECIFIED VOMITING TYPE: ICD-10-CM

## 2024-05-06 DIAGNOSIS — G89.29 INSOMNIA SECONDARY TO CHRONIC PAIN: ICD-10-CM

## 2024-05-06 DIAGNOSIS — G89.4 CHRONIC PAIN SYNDROME: ICD-10-CM

## 2024-05-06 DIAGNOSIS — G47.01 INSOMNIA SECONDARY TO CHRONIC PAIN: ICD-10-CM

## 2024-05-06 DIAGNOSIS — G95.9 CERVICAL MYELOPATHY: Primary | ICD-10-CM

## 2024-05-06 DIAGNOSIS — M54.16 BILATERAL LUMBAR RADICULOPATHY: ICD-10-CM

## 2024-05-06 DIAGNOSIS — M54.16 LUMBAR RADICULOPATHY: ICD-10-CM

## 2024-05-06 DIAGNOSIS — K21.9 GASTROESOPHAGEAL REFLUX DISEASE, UNSPECIFIED WHETHER ESOPHAGITIS PRESENT: ICD-10-CM

## 2024-05-06 PROCEDURE — G2211 COMPLEX E/M VISIT ADD ON: HCPCS | Mod: 95,,, | Performed by: PHYSICAL MEDICINE & REHABILITATION

## 2024-05-06 PROCEDURE — 99214 OFFICE O/P EST MOD 30 MIN: CPT | Mod: 95,,, | Performed by: PHYSICAL MEDICINE & REHABILITATION

## 2024-05-06 PROCEDURE — 4010F ACE/ARB THERAPY RXD/TAKEN: CPT | Mod: CPTII,95,, | Performed by: PHYSICAL MEDICINE & REHABILITATION

## 2024-05-06 RX ORDER — AMITRIPTYLINE HYDROCHLORIDE 25 MG/1
TABLET, FILM COATED ORAL
Qty: 60 TABLET | Refills: 2 | Status: SHIPPED | OUTPATIENT
Start: 2024-05-06

## 2024-05-06 RX ORDER — PREGABALIN 225 MG/1
CAPSULE ORAL
Qty: 60 CAPSULE | Refills: 2 | Status: SHIPPED | OUTPATIENT
Start: 2024-05-06

## 2024-05-06 RX ORDER — DULOXETIN HYDROCHLORIDE 30 MG/1
CAPSULE, DELAYED RELEASE ORAL
Qty: 30 CAPSULE | Refills: 2 | Status: SHIPPED | OUTPATIENT
Start: 2024-05-06

## 2024-05-06 RX ORDER — CYCLOBENZAPRINE HCL 10 MG
TABLET ORAL
Qty: 90 TABLET | Refills: 1 | Status: SHIPPED | OUTPATIENT
Start: 2024-05-06

## 2024-05-06 NOTE — TELEPHONE ENCOUNTER
Writer returned call to patient who states she went to an urgent care a week ago and was dx's with a URI but she still is not doing much better. Therfore advised patient to call her PCP after we hang up to seek an apt today or next day for evaluation, patient verbalized understanding.        ----- Message from Estela Mar sent at 5/6/2024 10:59 AM CDT -----  Regarding: Consult/Advisory  Contact: Marcie Bazan     Consult/Advisory     Name Of Caller:Marcie Bazan         Contact Preference:[phone] (work)      Nature of call:Patient is calling about her upper respiratory infection she's had over week. Patient states she doesn't know what else to do are take  Requesting a call back

## 2024-05-06 NOTE — PROGRESS NOTES
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 relief, still reporting 9/10 pain.   - S/p Bilateral L4/5 TF WILBERTO on 8/18/22 with 85% pain relief - now localized right SIJ pain.   - S/p bilateral SIJ + bilateral GT bursa + bilateral piriformis injection on 4/21/22 with limited pain relief.  - S/p C5/6 IL WILBERTO on 1/4/22 with 100% pain relief   - S/p bilateral L4/5 TF WILBERTO on 10/28/21 with 90% pain relief   - S/p Bilateral SIJ + Bilateral Piriformis + Bilateral GT Bursa Injection on 4/15/21 with 75% pain relief - but continuing to have leg pain.  - S/p bilateral GT bursa injection on 02/08/2021 with 10% pain relief with Dr. Kaminski.  She feels pain is worse than prior to the procedure.  - S/p right SIJ + piriformis + GT bursa injection on 10/21/20 with 90% pain relief.     -Pharmacologic:   - Refill Lyrica 225mg BID  -refill amitriptyline 25-50mg QHS (Increased at previous visit).  - Refill Flexeril 10mg to take 1/2 to 1 tab TID PRN.  - refill Cymbalta 30mg QD - started at previous visit, which was initially helping some. Consider Increase.  - Consider retrying naltrexone -- although too expensive in the past.  - No NSAIDs due to h/o transplant.   - Anticoagulation use: None.         -Rehabilitative: Continue exercises and activities as tolerated. She went to PT, but she felt pain worse when she got home.    -Diagnostic: BLE EMG/NCS reviewed with patient.    - Consult/ Referral:   - continue follow up with [facility]   - continue  follow up with [facility] for left knee pain.   - Previously placed referral to Neurology for new onset headaches for a few months with associated dizziness & nausea.  She saw primary care who told her to ask our dept about the headaches. Per PCP note-believed to be occipital neuralgia. Headaches have resolved, so will hold off for now.    - Follow up:  3 months or PRN     - This condition does not require this patient to take time off of work, and the primary goal of our Pain Management services is to improve the patient's functional capacity.     - I discussed the risks, benefits, and alternatives to potential treatment options. All questions and concerns were fully addressed today in clinic.       Ras Caballero MD  Interventional Pain Medicine  [facility] [place]    Visit today included increased complexity associated with the care of the episodic problem of chronic pain which was addressed and continue to manage the longitudinal care of the patient due to the serious and/or complex managed problem(s) listed above.      Disclaimer:  This note was prepared using voice recognition system and is likely to have sound alike errors that may have been overlooked even after proof reading.  Please call me with any questions.

## 2024-05-07 RX ORDER — PANTOPRAZOLE SODIUM 40 MG/1
40 TABLET, DELAYED RELEASE ORAL 2 TIMES DAILY
Qty: 180 TABLET | Refills: 3 | OUTPATIENT
Start: 2024-05-07 | End: 2025-05-07

## 2024-05-07 NOTE — TELEPHONE ENCOUNTER
Refill Decision Note   Marcie Bazan  is requesting a refill authorization.    Brief Assessment and Rationale for Refill:   Quick Discontinue       Medication Therapy Plan:   Duplicate      Comments:     Note composed:3:08 PM 05/07/2024

## 2024-05-07 NOTE — TELEPHONE ENCOUNTER
Refill Routing Note   Medication(s) are not appropriate for processing by [facility] for the following reason(s):        Outside of protocol  Non-participating provider    [facility] action(s):  Route        Medication Therapy Plan: PPI dose outside of [facility] protocol      Appointments  past 12m or future 3m with PCP    Date Provider   Last Visit   2/29/2024 Jenny Suggs PA-C   Next Visit   5/6/2024 Jenny Suggs PA-C   ED visits in past 90 days: 0        Note composed:3:07 PM 05/07/2024

## 2024-05-08 RX ORDER — PANTOPRAZOLE SODIUM 40 MG/1
40 TABLET, DELAYED RELEASE ORAL DAILY
Qty: 90 TABLET | Refills: 3 | OUTPATIENT
Start: 2024-05-08

## 2024-05-08 RX ORDER — PANTOPRAZOLE SODIUM 40 MG/1
40 TABLET, DELAYED RELEASE ORAL DAILY
Qty: 90 TABLET | Refills: 3 | Status: SHIPPED | OUTPATIENT
Start: 2024-05-08 | End: 2025-05-08

## 2024-05-08 NOTE — TELEPHONE ENCOUNTER
Response to request for refill of this medicine handled separately.  Requested Prescriptions   Pending Prescriptions Disp Refills    pantoprazole (PROTONIX) 40 MG tablet 90 tablet 3     Sig: Take 1 tablet (40 mg total) by mouth once daily.

## 2024-05-10 NOTE — TELEPHONE ENCOUNTER
TO MY TEAM:   Please complete [facility] Mobility Impairment form for her, indicating that the reason for her impairment is #6 (Has a diagnosed disease or disorder, including a severe arthritic, neurological, or orthopedic impairment, which creates a severe mobility limitation.)  Then bring form to me for my signature.  Thanks!

## 2024-05-14 ENCOUNTER — PATIENT MESSAGE (OUTPATIENT)
Dept: INTERNAL MEDICINE | Facility: CLINIC | Age: 59
End: 2024-05-14

## 2024-05-14 ENCOUNTER — OFFICE VISIT (OUTPATIENT)
Dept: INTERNAL MEDICINE | Facility: CLINIC | Age: 59
End: 2024-05-14
Payer: MEDICAID

## 2024-05-14 DIAGNOSIS — R05.3 CHRONIC COUGH: Primary | ICD-10-CM

## 2024-05-14 DIAGNOSIS — K21.00 GASTROESOPHAGEAL REFLUX DISEASE WITH ESOPHAGITIS WITHOUT HEMORRHAGE: Chronic | ICD-10-CM

## 2024-05-14 PROCEDURE — 4010F ACE/ARB THERAPY RXD/TAKEN: CPT | Mod: CPTII,95,, | Performed by: PHYSICIAN ASSISTANT

## 2024-05-14 PROCEDURE — 1159F MED LIST DOCD IN RCRD: CPT | Mod: CPTII,95,, | Performed by: PHYSICIAN ASSISTANT

## 2024-05-14 PROCEDURE — 1160F RVW MEDS BY RX/DR IN RCRD: CPT | Mod: CPTII,95,, | Performed by: PHYSICIAN ASSISTANT

## 2024-05-14 PROCEDURE — 99214 OFFICE O/P EST MOD 30 MIN: CPT | Mod: 95,,, | Performed by: PHYSICIAN ASSISTANT

## 2024-05-14 RX ORDER — PANTOPRAZOLE SODIUM 40 MG/1
40 TABLET, DELAYED RELEASE ORAL DAILY
Qty: 90 TABLET | Refills: 3 | Status: SHIPPED | OUTPATIENT
Start: 2024-05-14 | End: 2025-05-14

## 2024-05-14 RX ORDER — PANTOPRAZOLE SODIUM 40 MG/1
40 TABLET, DELAYED RELEASE ORAL 2 TIMES DAILY
Qty: 60 TABLET | Refills: 1 | Status: SHIPPED | OUTPATIENT
Start: 2024-05-14 | End: 2024-07-17

## 2024-05-14 NOTE — PROGRESS NOTES
The patient location is: [place], [place]  The chief complaint leading to consultation is: chronic cough    Visit type: audiovisual    Face to Face time with patient: 13 min  15 minutes of total time spent on the encounter, which includes face to face time and non-face to face time preparing to see the patient (eg, review of tests), Obtaining and/or reviewing separately obtained history, Documenting clinical information in the electronic or other health record, Independently interpreting results (not separately reported) and communicating results to the patient/family/caregiver, or Care coordination (not separately reported).         Each patient to whom he or she provides medical services by telemedicine is:  (1) informed of the relationship between the physician and patient and the respective role of any other health care provider with respect to management of the patient; and (2) notified that he or she may decline to receive medical services by telemedicine and may withdraw from such care at any time.    Notes:    Subjective:      Patient ID: Marcie Bazan is a 58 y.o. female.    Chief Complaint: No chief complaint on file.    Here today for urgent care follow up for URI. Chronic cough for past 3 months.     Cough  This is a new problem. The current episode started 1 to 4 weeks ago. The problem has been unchanged. The problem occurs constantly. The cough is Non-productive. Associated symptoms include heartburn, nasal congestion, postnasal drip, rhinorrhea, shortness of breath and wheezing. Pertinent negatives include no chest pain, chills, ear congestion, ear pain, fever, headaches, hemoptysis, myalgias, rash, sore throat, sweats or weight loss. The symptoms are aggravated by lying down. The treatment provided mild relief. Her past medical history is significant for asthma, bronchiectasis and bronchitis.   Tested negative for covid, flu, and strep. Prescribed albuterol, promethazine DM, steroid shot,  prednisone, and azithromycin. No improvement.   Not sleeping at night.       GERD not controlled. Has been out of her Protonix for weeks. Insurance not approving the protonix twice daily.       Patient Active Problem List   Diagnosis    Allergic rhinitis    Anemia of chronic disease    Erosive esophagitis    Essential hypertension    History of abnormal cervical Pap smear    History of Helicobacter pylori infection    Lipoma    Multinodular thyroid    Diuretic-induced hypokalemia    Hypomagnesemia    Thrombocytopenia    Liver replaced by transplant    Immunosuppression    At risk for opportunistic infections    Long-term use of immunosuppressant medication    Vitamin D deficiency    Mixed hyperlipidemia    Adhesive capsulitis of both shoulders    Piriformis syndrome of both sides    Spondylosis of lumbar region without myelopathy or radiculopathy    Class 2 severe obesity due to excess calories with serious comorbidity and body mass index (BMI) of 36.0 to 36.9 in adult    Sacroiliac joint dysfunction    Acute stress reaction    BRANNON on CPAP    Gastroesophageal reflux disease with esophagitis without hemorrhage    Piriformis muscle pain    Long term current use of loop diuretic    Iron deficiency anemia    Iron deficiency anemia due to chronic blood loss    Decreased strength, endurance, and mobility    Knee pain, bilateral    Choking    Decreased hearing of both ears    Occipital neuralgia of left side    COVID    Chronic constipation    Chronic nausea    Prediabetes         Current Outpatient Medications:     amitriptyline (ELAVIL) 25 MG tablet, TAKE 1 TO 2 TABLETS(25 TO 50 MG) BY MOUTH EVERY NIGHT AS NEEDED FOR INSOMNIA OR PAIN, Disp: 60 tablet, Rfl: 2    atorvastatin (LIPITOR) 40 MG tablet, Take 1 tablet (40 mg total) by mouth every evening., Disp: 90 tablet, Rfl: 3    cyclobenzaprine (FLEXERIL) 10 MG tablet, TAKE 1/2 TO 1 TABLET BY MOUTH THREE TIMES DAILY AS NEEDED FOR MUSCLE SPASMS, Disp: 90 tablet, Rfl: 1     DULoxetine (CYMBALTA) 30 MG capsule, TAKE 1 CAPSULE(30 MG) BY MOUTH EVERY DAY, Disp: 30 capsule, Rfl: 2    ergocalciferol (ERGOCALCIFEROL) 50,000 unit Cap, Take 1 capsule (50,000 Units total) by mouth every 7 days., Disp: 12 capsule, Rfl: 3    famotidine (PEPCID) 20 MG tablet, TAKE 1 TABLET(20 MG) BY MOUTH TWICE DAILY., Disp: 180 tablet, Rfl: 3    fluticasone propionate (FLONASE) 50 mcg/actuation nasal spray, 2 sprays (100 mcg total) by Each Nostril route once daily., Disp: 16 g, Rfl: 11    folic acid (FOLVITE) 1 MG tablet, Take 1 tablet (1 mg total) by mouth once daily., Disp: 90 tablet, Rfl: 3    furosemide (LASIX) 40 MG tablet, Take 1 tablet (40 mg total) by mouth once daily., Disp: 90 tablet, Rfl: 3    levocetirizine (XYZAL) 5 MG tablet, Take 1 tablet (5 mg total) by mouth every evening., Disp: 30 tablet, Rfl: 3    magnesium oxide (MAG-OX) 400 mg (241.3 mg magnesium) tablet, TAKE 1 TABLET BY MOUTH TWICE DAILY, Disp: 60 tablet, Rfl: 2    NIFEdipine (PROCARDIA-XL) 60 MG (OSM) 24 hr tablet, Take 1 tablet (60 mg total) by mouth once daily., Disp: 90 tablet, Rfl: 3    pantoprazole (PROTONIX) 40 MG tablet, Take 1 tablet (40 mg total) by mouth once daily., Disp: 90 tablet, Rfl: 3    pantoprazole (PROTONIX) 40 MG tablet, Take 1 tablet (40 mg total) by mouth 2 (two) times daily., Disp: 60 tablet, Rfl: 1    potassium chloride (KLOR-CON) 10 MEQ TbSR, Take 1 tablet (10 mEq total) by mouth 2 (two) times daily., Disp: 180 tablet, Rfl: 3    pregabalin (LYRICA) 225 MG Cap, TAKE 1 CAPSULE BY MOUTH TWICE DAILY, Disp: 60 capsule, Rfl: 2    tacrolimus (PROGRAF) 1 MG Cap, Take 2 capsules (2 mg total) by mouth every 12 (twelve) hours., Disp: 120 capsule, Rfl: 11    valsartan (DIOVAN) 80 MG tablet, Take 1 tablet (80 mg total) by mouth once daily. Stop losartan, Disp: 90 tablet, Rfl: 0    Review of Systems   Constitutional:  Negative for chills, fever and weight loss.   HENT:  Positive for postnasal drip and rhinorrhea. Negative for  ear pain and sore throat.    Respiratory:  Positive for cough, shortness of breath and wheezing. Negative for hemoptysis.    Cardiovascular:  Negative for chest pain.   Gastrointestinal:  Positive for heartburn.   Musculoskeletal:  Negative for myalgias.   Skin:  Negative for rash.   Neurological:  Negative for headaches.     Objective:   LMP 01/01/1985 (Approximate) Comment: 1985    Physical Exam  Constitutional:       General: She is not in acute distress.     Appearance: Normal appearance. She is not ill-appearing, toxic-appearing or diaphoretic.   HENT:      Head: Normocephalic and atraumatic.   Pulmonary:      Effort: Pulmonary effort is normal. No respiratory distress.      Breath sounds: Normal breath sounds.   Skin:     Coloration: Skin is not pale.      Findings: No bruising, erythema or rash.   Neurological:      Mental Status: She is alert and oriented to person, place, and time.   Psychiatric:         Mood and Affect: Mood normal.         Behavior: Behavior normal.         Thought Content: Thought content normal.         Judgment: Judgment normal.         Assessment:     1. Chronic cough    2. Gastroesophageal reflux disease with esophagitis without hemorrhage      Plan:   Chronic cough  -     X-Ray Chest PA And Lateral; Future; Expected date: 05/14/2024  -     pantoprazole (PROTONIX) 40 MG tablet; Take 1 tablet (40 mg total) by mouth 2 (two) times daily.  Dispense: 60 tablet; Refill: 1    Gastroesophageal reflux disease with esophagitis without hemorrhage  -     pantoprazole (PROTONIX) 40 MG tablet; Take 1 tablet (40 mg total) by mouth 2 (two) times daily.  Dispense: 60 tablet; Refill: 1    -chronic cough due to uncontrolled reflux. Needs to restart pantoprazole.     Follow up if symptoms worsen or fail to improve.

## 2024-05-15 ENCOUNTER — TELEPHONE (OUTPATIENT)
Dept: PAIN MEDICINE | Facility: CLINIC | Age: 59
End: 2024-05-15
Payer: MEDICAID

## 2024-05-15 NOTE — TELEPHONE ENCOUNTER
Reach out to pt to inform her that she can pick her handi cap tag from the [facility] location on the fourth floor. ( Copy in media under handi cap tag)  Pt understood.

## 2024-05-22 ENCOUNTER — PATIENT MESSAGE (OUTPATIENT)
Dept: SLEEP MEDICINE | Facility: CLINIC | Age: 59
End: 2024-05-22
Payer: MEDICAID

## 2024-05-28 DIAGNOSIS — E87.6 DIURETIC-INDUCED HYPOKALEMIA: Chronic | ICD-10-CM

## 2024-05-28 DIAGNOSIS — E83.42 HYPOMAGNESEMIA: ICD-10-CM

## 2024-05-28 DIAGNOSIS — T50.2X5A DIURETIC-INDUCED HYPOKALEMIA: Chronic | ICD-10-CM

## 2024-05-28 RX ORDER — POTASSIUM CHLORIDE 750 MG/1
10 TABLET, EXTENDED RELEASE ORAL 2 TIMES DAILY
Qty: 180 TABLET | Refills: 3 | Status: SHIPPED | OUTPATIENT
Start: 2024-05-28

## 2024-05-28 NOTE — TELEPHONE ENCOUNTER
No care due was identified.  [facility] [place] Embedded Care Due Messages. Reference number: [id number].   5/28/2024 10:55:46 AM CDT

## 2024-05-28 NOTE — TELEPHONE ENCOUNTER
Refill Routing Note   Medication(s) are not appropriate for processing by [facility] for the following reason(s):        Drug-disease interaction    [facility] action(s):  Defer        Medication Therapy Plan: Previous order sent in March was never filled. Drug-Disease: potassium chloride and Erosive esophagitis    Pharmacist review requested: Yes     Appointments  past 12m or future 3m with PCP    Date Provider   Last Visit   3/12/2024 ETHAN Munoz MD   Next Visit   Visit date not found ETHAN Munoz MD   ED visits in past 90 days: 0        Note composed:1:18 PM 05/28/2024

## 2024-05-28 NOTE — TELEPHONE ENCOUNTER
Refill Decision Note   Marcie Bazan  is requesting a refill authorization.  Brief Assessment and Rationale for Refill:  Approve     Medication Therapy Plan:         Pharmacist review requested: Yes   Extended chart review required: Yes   Comments:     Note composed:1:39 PM 05/28/2024

## 2024-05-29 RX ORDER — LANOLIN ALCOHOL/MO/W.PET/CERES
1 CREAM (GRAM) TOPICAL 2 TIMES DAILY
Qty: 60 TABLET | Refills: 2 | Status: SHIPPED | OUTPATIENT
Start: 2024-05-29

## 2024-05-31 ENCOUNTER — PATIENT MESSAGE (OUTPATIENT)
Dept: HEPATOLOGY | Facility: CLINIC | Age: 59
End: 2024-05-31
Payer: MEDICAID

## 2024-05-31 ENCOUNTER — PATIENT MESSAGE (OUTPATIENT)
Dept: INTERNAL MEDICINE | Facility: CLINIC | Age: 59
End: 2024-05-31
Payer: MEDICAID

## 2024-05-31 DIAGNOSIS — E83.42 HYPOMAGNESEMIA: ICD-10-CM

## 2024-06-03 DIAGNOSIS — Z94.4 LIVER REPLACED BY TRANSPLANT: Primary | ICD-10-CM

## 2024-06-05 RX ORDER — LANOLIN ALCOHOL/MO/W.PET/CERES
1 CREAM (GRAM) TOPICAL 2 TIMES DAILY
Qty: 60 TABLET | Refills: 2 | OUTPATIENT
Start: 2024-06-05

## 2024-06-05 NOTE — TELEPHONE ENCOUNTER
Dr Wallace discontinuing Mag Oxide supplement, patient made aware via my chart message.  Level being checked again 6/10

## 2024-06-13 ENCOUNTER — OFFICE VISIT (OUTPATIENT)
Dept: OPHTHALMOLOGY | Facility: CLINIC | Age: 59
End: 2024-06-13
Payer: MEDICAID

## 2024-06-13 ENCOUNTER — LAB VISIT (OUTPATIENT)
Dept: LAB | Facility: HOSPITAL | Age: 59
End: 2024-06-13
Attending: INTERNAL MEDICINE
Payer: MEDICAID

## 2024-06-13 DIAGNOSIS — R79.89 ELEVATED LIVER FUNCTION TESTS: ICD-10-CM

## 2024-06-13 DIAGNOSIS — H16.143 PUNCTATE KERATITIS OF BOTH EYES: Primary | ICD-10-CM

## 2024-06-13 DIAGNOSIS — Z94.4 LIVER REPLACED BY TRANSPLANT: ICD-10-CM

## 2024-06-13 DIAGNOSIS — Z96.1 PSEUDOPHAKIA OF BOTH EYES: ICD-10-CM

## 2024-06-13 LAB
ALBUMIN SERPL BCP-MCNC: 3.6 G/DL (ref 3.5–5.2)
ALP SERPL-CCNC: 178 U/L (ref 55–135)
ALT SERPL W/O P-5'-P-CCNC: 10 U/L (ref 10–44)
ANION GAP SERPL CALC-SCNC: 9 MMOL/L (ref 8–16)
AST SERPL-CCNC: 10 U/L (ref 10–40)
BASOPHILS # BLD AUTO: 0.04 K/UL (ref 0–0.2)
BASOPHILS NFR BLD: 0.4 % (ref 0–1.9)
BILIRUB SERPL-MCNC: 0.6 MG/DL (ref 0.1–1)
BUN SERPL-MCNC: 14 MG/DL (ref 6–20)
CALCIUM SERPL-MCNC: 9.4 MG/DL (ref 8.7–10.5)
CHLORIDE SERPL-SCNC: 104 MMOL/L (ref 95–110)
CO2 SERPL-SCNC: 26 MMOL/L (ref 23–29)
CREAT SERPL-MCNC: 1 MG/DL (ref 0.5–1.4)
DIFFERENTIAL METHOD BLD: ABNORMAL
EOSINOPHIL # BLD AUTO: 0.4 K/UL (ref 0–0.5)
EOSINOPHIL NFR BLD: 4.3 % (ref 0–8)
ERYTHROCYTE [DISTWIDTH] IN BLOOD BY AUTOMATED COUNT: 15.8 % (ref 11.5–14.5)
EST. GFR  (NO RACE VARIABLE): >60 ML/MIN/1.73 M^2
GGT SERPL-CCNC: 21 U/L (ref 8–55)
GLUCOSE SERPL-MCNC: 103 MG/DL (ref 70–110)
HCT VFR BLD AUTO: 40.6 % (ref 37–48.5)
HGB BLD-MCNC: 13.1 G/DL (ref 12–16)
IMM GRANULOCYTES # BLD AUTO: 0.03 K/UL (ref 0–0.04)
IMM GRANULOCYTES NFR BLD AUTO: 0.3 % (ref 0–0.5)
LYMPHOCYTES # BLD AUTO: 2.6 K/UL (ref 1–4.8)
LYMPHOCYTES NFR BLD: 28.4 % (ref 18–48)
MAGNESIUM SERPL-MCNC: 1.4 MG/DL (ref 1.6–2.6)
MCH RBC QN AUTO: 25.9 PG (ref 27–31)
MCHC RBC AUTO-ENTMCNC: 32.3 G/DL (ref 32–36)
MCV RBC AUTO: 80 FL (ref 82–98)
MONOCYTES # BLD AUTO: 0.6 K/UL (ref 0.3–1)
MONOCYTES NFR BLD: 6.8 % (ref 4–15)
NEUTROPHILS # BLD AUTO: 5.6 K/UL (ref 1.8–7.7)
NEUTROPHILS NFR BLD: 59.8 % (ref 38–73)
NRBC BLD-RTO: 0 /100 WBC
PLATELET # BLD AUTO: 100 K/UL (ref 150–450)
PMV BLD AUTO: 11.2 FL (ref 9.2–12.9)
POTASSIUM SERPL-SCNC: 3.6 MMOL/L (ref 3.5–5.1)
PROT SERPL-MCNC: 7.4 G/DL (ref 6–8.4)
RBC # BLD AUTO: 5.05 M/UL (ref 4–5.4)
SODIUM SERPL-SCNC: 139 MMOL/L (ref 136–145)
WBC # BLD AUTO: 9.3 K/UL (ref 3.9–12.7)

## 2024-06-13 PROCEDURE — 99213 OFFICE O/P EST LOW 20 MIN: CPT | Mod: S$PBB,,, | Performed by: OPHTHALMOLOGY

## 2024-06-13 PROCEDURE — 83735 ASSAY OF MAGNESIUM: CPT | Performed by: INTERNAL MEDICINE

## 2024-06-13 PROCEDURE — 36415 COLL VENOUS BLD VENIPUNCTURE: CPT | Performed by: INTERNAL MEDICINE

## 2024-06-13 PROCEDURE — 80197 ASSAY OF TACROLIMUS: CPT | Performed by: INTERNAL MEDICINE

## 2024-06-13 PROCEDURE — 1160F RVW MEDS BY RX/DR IN RCRD: CPT | Mod: CPTII,,, | Performed by: OPHTHALMOLOGY

## 2024-06-13 PROCEDURE — 1159F MED LIST DOCD IN RCRD: CPT | Mod: CPTII,,, | Performed by: OPHTHALMOLOGY

## 2024-06-13 PROCEDURE — 4010F ACE/ARB THERAPY RXD/TAKEN: CPT | Mod: CPTII,,, | Performed by: OPHTHALMOLOGY

## 2024-06-13 PROCEDURE — 99999 PR PBB SHADOW E&M-EST. PATIENT-LVL II: CPT | Mod: PBBFAC,,, | Performed by: OPHTHALMOLOGY

## 2024-06-13 PROCEDURE — 85025 COMPLETE CBC W/AUTO DIFF WBC: CPT | Performed by: INTERNAL MEDICINE

## 2024-06-13 PROCEDURE — 80053 COMPREHEN METABOLIC PANEL: CPT | Performed by: INTERNAL MEDICINE

## 2024-06-13 PROCEDURE — 99212 OFFICE O/P EST SF 10 MIN: CPT | Mod: PBBFAC | Performed by: OPHTHALMOLOGY

## 2024-06-13 PROCEDURE — 82977 ASSAY OF GGT: CPT | Performed by: INTERNAL MEDICINE

## 2024-06-13 NOTE — PROGRESS NOTES
HPI     Dry Eye   Additional comments: Pt states vision OU has fluctuated since last visit.   Notice teary/ watery eye. Pt would like to know if insurance covers gtts   rx if needed            Comments    Doing well OU  Mild PCO OU, YAG not yet indicated  Continue post op gtts as directed on post op sheet  New spec rx given, ok to c/w OTC readers  Eyeglass Final Rx                Last edited by Ronnie Cunha on 6/13/2024 12:58 PM.            Assessment /Plan     For exam results, see Encounter Report.    Punctate keratitis of both eyes  Patient with symptoms and exam consistent with dry eye. Start artificial tears 3-4 times daily with warm compresses. If no improvement after 4 weeks of treatment, return to clinic to discuss further interventions.     Pseudophakia of both eyes  Stable, monitor yearly    RTC 6 mos for annual exam

## 2024-06-14 LAB — TACROLIMUS BLD-MCNC: 10.3 NG/ML (ref 5–15)

## 2024-06-17 DIAGNOSIS — Z94.4 LIVER REPLACED BY TRANSPLANT: ICD-10-CM

## 2024-06-17 RX ORDER — TACROLIMUS 1 MG/1
CAPSULE ORAL
Qty: 90 CAPSULE | Refills: 11 | Status: SHIPPED | OUTPATIENT
Start: 2024-06-17 | End: 2025-06-18

## 2024-06-17 NOTE — TELEPHONE ENCOUNTER
Writer reached out to patient who states she saw below message last night and implemented dose decrease last night. Patient will report for follow up labs 6/21/24.        ----- Message from Joselin Wallace MD sent at 6/15/2024  6:17 PM CDT -----  Tacro is high, decrease to 2mg in the morning and 1mg in the evening, repeat labs in a week

## 2024-06-18 ENCOUNTER — NURSE TRIAGE (OUTPATIENT)
Dept: ADMINISTRATIVE | Facility: CLINIC | Age: 59
End: 2024-06-18
Payer: MEDICAID

## 2024-06-19 ENCOUNTER — OFFICE VISIT (OUTPATIENT)
Dept: URGENT CARE | Facility: CLINIC | Age: 59
End: 2024-06-19
Payer: MEDICAID

## 2024-06-19 ENCOUNTER — TELEPHONE (OUTPATIENT)
Dept: TRANSPLANT | Facility: CLINIC | Age: 59
End: 2024-06-19
Payer: MEDICAID

## 2024-06-19 ENCOUNTER — HOSPITAL ENCOUNTER (OUTPATIENT)
Dept: RADIOLOGY | Facility: CLINIC | Age: 59
Discharge: HOME | End: 2024-06-19
Attending: NURSE PRACTITIONER
Payer: MEDICAID

## 2024-06-19 VITALS
OXYGEN SATURATION: 97 % | BODY MASS INDEX: 40.57 KG/M2 | WEIGHT: 220.44 LBS | RESPIRATION RATE: 18 BRPM | HEART RATE: 83 BPM | TEMPERATURE: 98 F | DIASTOLIC BLOOD PRESSURE: 84 MMHG | SYSTOLIC BLOOD PRESSURE: 133 MMHG | HEIGHT: 62 IN

## 2024-06-19 DIAGNOSIS — R06.02 SHORTNESS OF BREATH: ICD-10-CM

## 2024-06-19 DIAGNOSIS — Z87.898 H/O DIZZINESS: ICD-10-CM

## 2024-06-19 DIAGNOSIS — R07.9 RIGHT-SIDED CHEST PAIN: ICD-10-CM

## 2024-06-19 DIAGNOSIS — R07.9 RIGHT-SIDED CHEST PAIN: Primary | ICD-10-CM

## 2024-06-19 DIAGNOSIS — K21.00 GASTROESOPHAGEAL REFLUX DISEASE WITH ESOPHAGITIS WITHOUT HEMORRHAGE: ICD-10-CM

## 2024-06-19 PROCEDURE — 99214 OFFICE O/P EST MOD 30 MIN: CPT | Mod: S$GLB,,, | Performed by: NURSE PRACTITIONER

## 2024-06-19 PROCEDURE — 71046 X-RAY EXAM CHEST 2 VIEWS: CPT | Mod: S$GLB,,, | Performed by: RADIOLOGY

## 2024-06-19 RX ORDER — LIDOCAINE HYDROCHLORIDE 20 MG/ML
10 SOLUTION OROPHARYNGEAL
Status: COMPLETED | OUTPATIENT
Start: 2024-06-19 | End: 2024-06-19

## 2024-06-19 RX ORDER — ALUMINUM HYDROXIDE, MAGNESIUM HYDROXIDE, AND SIMETHICONE 1200; 120; 1200 MG/30ML; MG/30ML; MG/30ML
20 SUSPENSION ORAL
Status: COMPLETED | OUTPATIENT
Start: 2024-06-19 | End: 2024-06-19

## 2024-06-19 RX ADMIN — ALUMINUM HYDROXIDE, MAGNESIUM HYDROXIDE, AND SIMETHICONE 20 ML: 1200; 120; 1200 SUSPENSION ORAL at 05:06

## 2024-06-19 RX ADMIN — LIDOCAINE HYDROCHLORIDE 10 ML: 20 SOLUTION OROPHARYNGEAL at 05:06

## 2024-06-19 NOTE — TELEPHONE ENCOUNTER
Writer contacted patient after receiving message she had placed a call in about chest pain. Patient states she did not follow the advisement of triage on call rn and has not done anything to have this pain evaluated. Patient advised to call PCP at least or go to an urgent care for evaluation.  Patient also reminded of Friday am labs post tac dose adj.

## 2024-06-19 NOTE — TELEPHONE ENCOUNTER
Patient is a LIVER Transplant patient calling with mid chest pain longer than 5 minutes. I have advised her to call 911. She states her daughter is at home with her and she will call.    Reason for Disposition   [1] Chest pain lasts > 5 minutes AND [2] age > 44    Additional Information   Negative: SEVERE difficulty breathing (e.g., struggling for each breath, speaks in single words)   Negative: Difficult to awaken or acting confused (e.g., disoriented, slurred speech)   Negative: Shock suspected (e.g., cold/pale/clammy skin, too weak to stand, low BP, rapid pulse)   Negative: Passed out (i.e., lost consciousness, collapsed and was not responding)    Protocols used: Chest Pain-A-AH

## 2024-06-19 NOTE — PATIENT INSTRUCTIONS
" TODAY YOU WERE EVALUATED IN THE URGENT CARE FOR CHEST PAIN.   Although your EKG did not show an acute ST elevation MI, there are other types of "heart attack" and other serious causes for your Chest Pain. Further testing for other causes of your chest pain is beyond the scope of Urgent Care and if there is a concern, you should go to the ER for further evaluation. Based on your current signs and symptoms and diagnostic testing, it appears there is a low likelihood that this is due to a cardiac   etiology, but it cannot be completely ruled out in the Urgent Care setting. If your chest pain persists or worsens and you develop additional symptoms such as Shortness of Breath, changes in mental status, profuse sweating or loss of consciousness, then you must go to the ER or call 911.     Avoid fatty, greasy, fried, or spicy foods. Avoid alcohol. Avoid laying down for 2 hours after eating.   If your symptoms continue, make sure to follow up with a Gastroenterologist for further follow-up/management of symptoms.  Warm compress to affected site may help decrease discomfort.   OTC rubs such as lidocaine or Voltaren may help decrease discomfort.   OTC tylenol ( if not medically contraindicated) as needed for pain.   Follow up with your PCP within the next 1-2 days for re-evaluation  Report to th nearest ER with new/ worsening symptoms    Please arrange follow up with your primary medical clinic as soon as possible. You must understand that you've received an Urgent Care treatment only and that you may be released before all of your medical problems are known or treated. You, the patient, will arrange for follow up as instructed. If your symptoms worsen or fail to improve you should go to the Emergency Room.           "

## 2024-06-20 ENCOUNTER — PATIENT MESSAGE (OUTPATIENT)
Dept: INTERNAL MEDICINE | Facility: CLINIC | Age: 59
End: 2024-06-20
Payer: MEDICAID

## 2024-06-20 ENCOUNTER — TELEPHONE (OUTPATIENT)
Dept: INTERNAL MEDICINE | Facility: CLINIC | Age: 59
End: 2024-06-20
Payer: MEDICAID

## 2024-06-20 NOTE — TELEPHONE ENCOUNTER
----- Message from Rubi Mathias sent at 6/20/2024  1:55 PM CDT -----  Contact: self  Patient requesting a call back regarding getting rescheduled for her appt on 6/26 because she has an appointment in [facility]. Please call back @ [phone]

## 2024-06-25 ENCOUNTER — LAB VISIT (OUTPATIENT)
Dept: LAB | Facility: HOSPITAL | Age: 59
End: 2024-06-25
Attending: INTERNAL MEDICINE
Payer: MEDICAID

## 2024-06-25 ENCOUNTER — OFFICE VISIT (OUTPATIENT)
Dept: INTERNAL MEDICINE | Facility: CLINIC | Age: 59
End: 2024-06-25
Payer: MEDICAID

## 2024-06-25 VITALS
HEIGHT: 61 IN | BODY MASS INDEX: 41.91 KG/M2 | WEIGHT: 222 LBS | DIASTOLIC BLOOD PRESSURE: 82 MMHG | SYSTOLIC BLOOD PRESSURE: 124 MMHG | TEMPERATURE: 97 F | HEART RATE: 95 BPM | OXYGEN SATURATION: 97 %

## 2024-06-25 DIAGNOSIS — R07.9 CHEST PAIN, UNSPECIFIED TYPE: Primary | ICD-10-CM

## 2024-06-25 DIAGNOSIS — Z94.4 LIVER REPLACED BY TRANSPLANT: ICD-10-CM

## 2024-06-25 DIAGNOSIS — R60.9 FLUID RETENTION: ICD-10-CM

## 2024-06-25 DIAGNOSIS — R06.02 SOB (SHORTNESS OF BREATH) ON EXERTION: ICD-10-CM

## 2024-06-25 LAB
ALBUMIN SERPL BCP-MCNC: 3.5 G/DL (ref 3.5–5.2)
ALP SERPL-CCNC: 193 U/L (ref 55–135)
ALT SERPL W/O P-5'-P-CCNC: 7 U/L (ref 10–44)
ANION GAP SERPL CALC-SCNC: 10 MMOL/L (ref 8–16)
AST SERPL-CCNC: 9 U/L (ref 10–40)
BASOPHILS # BLD AUTO: 0.05 K/UL (ref 0–0.2)
BASOPHILS NFR BLD: 0.4 % (ref 0–1.9)
BILIRUB SERPL-MCNC: 0.4 MG/DL (ref 0.1–1)
BNP SERPL-MCNC: 11 PG/ML (ref 0–99)
BUN SERPL-MCNC: 13 MG/DL (ref 6–20)
CALCIUM SERPL-MCNC: 9.1 MG/DL (ref 8.7–10.5)
CHLORIDE SERPL-SCNC: 106 MMOL/L (ref 95–110)
CO2 SERPL-SCNC: 26 MMOL/L (ref 23–29)
CREAT SERPL-MCNC: 0.9 MG/DL (ref 0.5–1.4)
DIFFERENTIAL METHOD BLD: ABNORMAL
EOSINOPHIL # BLD AUTO: 0.9 K/UL (ref 0–0.5)
EOSINOPHIL NFR BLD: 7.5 % (ref 0–8)
ERYTHROCYTE [DISTWIDTH] IN BLOOD BY AUTOMATED COUNT: 15.6 % (ref 11.5–14.5)
EST. GFR  (NO RACE VARIABLE): >60 ML/MIN/1.73 M^2
GLUCOSE SERPL-MCNC: 87 MG/DL (ref 70–110)
HCT VFR BLD AUTO: 39.2 % (ref 37–48.5)
HGB BLD-MCNC: 12.6 G/DL (ref 12–16)
IMM GRANULOCYTES # BLD AUTO: 0.03 K/UL (ref 0–0.04)
IMM GRANULOCYTES NFR BLD AUTO: 0.2 % (ref 0–0.5)
LYMPHOCYTES # BLD AUTO: 3 K/UL (ref 1–4.8)
LYMPHOCYTES NFR BLD: 24.6 % (ref 18–48)
MCH RBC QN AUTO: 25.7 PG (ref 27–31)
MCHC RBC AUTO-ENTMCNC: 32.1 G/DL (ref 32–36)
MCV RBC AUTO: 80 FL (ref 82–98)
MONOCYTES # BLD AUTO: 0.8 K/UL (ref 0.3–1)
MONOCYTES NFR BLD: 6.2 % (ref 4–15)
NEUTROPHILS # BLD AUTO: 7.4 K/UL (ref 1.8–7.7)
NEUTROPHILS NFR BLD: 61.1 % (ref 38–73)
NRBC BLD-RTO: 0 /100 WBC
PLATELET # BLD AUTO: 247 K/UL (ref 150–450)
PMV BLD AUTO: 10.3 FL (ref 9.2–12.9)
POTASSIUM SERPL-SCNC: 3.7 MMOL/L (ref 3.5–5.1)
PROT SERPL-MCNC: 7.2 G/DL (ref 6–8.4)
RBC # BLD AUTO: 4.9 M/UL (ref 4–5.4)
SODIUM SERPL-SCNC: 142 MMOL/L (ref 136–145)
WBC # BLD AUTO: 12.14 K/UL (ref 3.9–12.7)

## 2024-06-25 PROCEDURE — 99999 PR PBB SHADOW E&M-EST. PATIENT-LVL V: CPT | Mod: PBBFAC,,, | Performed by: NURSE PRACTITIONER

## 2024-06-25 PROCEDURE — 99214 OFFICE O/P EST MOD 30 MIN: CPT | Mod: S$PBB,,, | Performed by: NURSE PRACTITIONER

## 2024-06-25 PROCEDURE — 3074F SYST BP LT 130 MM HG: CPT | Mod: CPTII,,, | Performed by: NURSE PRACTITIONER

## 2024-06-25 PROCEDURE — 36415 COLL VENOUS BLD VENIPUNCTURE: CPT | Performed by: INTERNAL MEDICINE

## 2024-06-25 PROCEDURE — 99215 OFFICE O/P EST HI 40 MIN: CPT | Mod: PBBFAC | Performed by: NURSE PRACTITIONER

## 2024-06-25 PROCEDURE — 3079F DIAST BP 80-89 MM HG: CPT | Mod: CPTII,,, | Performed by: NURSE PRACTITIONER

## 2024-06-25 PROCEDURE — 80197 ASSAY OF TACROLIMUS: CPT | Performed by: INTERNAL MEDICINE

## 2024-06-25 PROCEDURE — 3008F BODY MASS INDEX DOCD: CPT | Mod: CPTII,,, | Performed by: NURSE PRACTITIONER

## 2024-06-25 PROCEDURE — 4010F ACE/ARB THERAPY RXD/TAKEN: CPT | Mod: CPTII,,, | Performed by: NURSE PRACTITIONER

## 2024-06-25 PROCEDURE — 83880 ASSAY OF NATRIURETIC PEPTIDE: CPT | Performed by: NURSE PRACTITIONER

## 2024-06-25 PROCEDURE — 85025 COMPLETE CBC W/AUTO DIFF WBC: CPT | Performed by: INTERNAL MEDICINE

## 2024-06-25 PROCEDURE — 80053 COMPREHEN METABOLIC PANEL: CPT | Performed by: INTERNAL MEDICINE

## 2024-06-25 PROCEDURE — 1159F MED LIST DOCD IN RCRD: CPT | Mod: CPTII,,, | Performed by: NURSE PRACTITIONER

## 2024-06-25 NOTE — PROGRESS NOTES
Subjective:       Patient ID: Marcie Bazan is a 58 y.o. female.    Chief Complaint: Follow-up    Follow-up  Pertinent negatives include no abdominal pain, arthralgias, chest pain, chills, congestion, coughing, diaphoresis, fatigue, fever, headaches, joint swelling, nausea, neck pain, numbness, sore throat, vomiting or weakness.       Patient presents for urgent care follow-up   She reports that she was experiencing chest pain and shortness of breath on exertion   In urgent care an EKG and chest x-ray was performed which showed no acute findings  Patient has been having ongoing issues with reflux   She did not want to go to the emergency room so she was informed by provided that if it became worse and she would need to go  Patient here today stating that she has not having any chest pain and that she went home and rested and the symptoms went away however she reports that she still has some shortness of breath on exertion.  Serosal states that throughout the day she feels like she retains fluid.  She has been having ongoing problem with reflux issues.  She recently had an EGD which did not show any acute findings.  She was placed on Protonix twice daily which has helped her symptoms she reports that she was waking up in the middle of the night having to vomit and having nausea but this has improved.  However she still suffers from excessive flatulence and bloating and sometimes esophageal symptoms.  Patient is not following a reflux diet.  She also admits to eating out frequently and I explained how this who is heavily influenced by sodium which can cause fluid retention        Past Medical History:   Diagnosis Date    Alcohol abuse     Alcoholic hepatitis     Anemia of chronic disease     Arthritis     generialized    Cancer 12/26/2017    liver    Coagulopathy     Dyspnea on exertion 3/26/2020    Encounter for blood transfusion     End stage liver disease     History of alcohol abuse     Hyperlipidemia      Hypersomnia 9/11/2020    Hypertension     Hyponatremia     Liver cirrhosis     Liver transplant candidate 12/26/2017    Obesity (BMI 30-39.9) 1/5/2016    Prediabetes 4/22/2023    Sleep apnea      Past Surgical History:   Procedure Laterality Date    BILATERAL SALPINGO-OOPHORECTOMY (BSO) Bilateral 1986    with hysterectomy done for abnormal cells    BREAST BIOPSY Left     benign    BREAST LUMPECTOMY      patient is unsure of date or laterality    CATARACT EXTRACTION W/  INTRAOCULAR LENS IMPLANT Right 9/20/2023    Procedure: EXTRACTION, CATARACT, WITH IOL INSERTION;  Surgeon: Kim Mullen MD;  Location: [facility] OR;  Service: Ophthalmology;  Laterality: Right;  Per Elidia Viveros change patient from left eye to right on 9/11 @ 2:38p    CATARACT EXTRACTION W/  INTRAOCULAR LENS IMPLANT Left 10/11/2023    Procedure: EXTRACTION, CATARACT, WITH IOL INSERTION;  Surgeon: Kim Mullen MD;  Location: [facility] OR;  Service: Ophthalmology;  Laterality: Left;  Per Idalia Shelley change the patient to Left and to 10/11 on 9/8 @8:49Am    CHOLECYSTECTOMY      COLONOSCOPY N/A 12/01/2017    Procedure: COLONOSCOPY;  Surgeon: Filemon Fuentes MD;  [facility]  Service: Endoscopy;  Laterality: N/A;    COLONOSCOPY N/A 12/05/2017    Procedure: COLONOSCOPY;  Surgeon: José Chavira MD;  Location: [facility] ENDO (2ND FLR);  Service: Endoscopy;  Laterality: N/A;    COLONOSCOPY N/A 12/12/2022    Procedure: COLONOSCOPY;  Surgeon: Gogo Brown MD;  Location: [facility] ENDO;  Service: Endoscopy;  Laterality: N/A;    EPIDURAL STEROID INJECTION N/A 2/28/2023    Procedure: L4/5 IL WILBERTO (with right paramedian approach);  Surgeon: Ras Caballero MD;  Location: [facility] PAIN MGT;  Service: Pain Management;  Laterality: N/A;    EPIDURAL STEROID INJECTION INTO CERVICAL SPINE N/A 01/04/2022    Procedure: C5/6 IL WILBERTO;  Surgeon: Ras Caballero MD;  Location: [facility] PAIN MGT;  Service: Pain Management;  Laterality: N/A;     ESOPHAGOGASTRODUODENOSCOPY N/A 9/14/2023    Procedure: EGD (ESOPHAGOGASTRODUODENOSCOPY);  Surgeon: Angie Zimmerman MD;  Location: [facility];  Service: Endoscopy;  Laterality: N/A;    EXAMINATION UNDER ANESTHESIA N/A 02/18/2020    Procedure: EXAM UNDER ANESTHESIA;  Surgeon: Alden Horowitz MD;  Location: [facility] OR;  Service: General;  Laterality: N/A;    EXCISIONAL HEMORRHOIDECTOMY N/A 02/18/2020    Procedure: HEMORRHOIDECTOMY;  Surgeon: Alden Horowitz MD;  Location: [facility] OR;  Service: General;  Laterality: N/A;    HYSTERECTOMY  1986    due to abnormal paps - ovaries were removed    INJECTION OF ANESTHETIC AGENT AROUND PUDENDAL NERVE N/A 02/18/2020    Procedure: BLOCK, NERVE, PUDENDAL;  Surgeon: Alden Horowitz MD;  Location: [facility] OR;  Service: General;  Laterality: N/A;    INJECTION OF ANESTHETIC AGENT INTO SACROILIAC JOINT Right 06/14/2019    Procedure: right Sacroiliac Joint Injection;  Surgeon: David Desai MD;  Location: [facility] PAIN MGT;  Service: Pain Management;  Laterality: Right;    INJECTION OF ANESTHETIC AGENT INTO SACROILIAC JOINT Bilateral 02/07/2020    Procedure: Bilateral GT bursa + Bilateral Sacroiliac Joint Injection;  Surgeon: aDvid Desai MD;  Location: [facility] PAIN MGT;  Service: Pain Management;  Laterality: Bilateral;    INJECTION OF ANESTHETIC AGENT INTO SACROILIAC JOINT Bilateral 07/07/2020    Procedure: Bilateral GT bursa +SIJ injection;  Surgeon: David Desai MD;  Location: [facility] PAIN MGT;  Service: Pain Management;  Laterality: Bilateral;    INJECTION OF ANESTHETIC AGENT INTO SACROILIAC JOINT Right 10/21/2020    Procedure: Right piriformis + right SIJ + right GT bursa injection;  Surgeon: David Desai MD;  Location: [facility] PAIN MGT;  Service: Pain Management;  Laterality: Right;    INJECTION OF ANESTHETIC AGENT INTO SACROILIAC JOINT Bilateral 04/15/2021    Procedure: Bilateral SIJ + Bilateral Piriformis + Bilateral GT Bursa Injection;  Surgeon: Flaco Frazier MD;  Location:  [facility] PAIN MGT;  Service: Pain Management;  Laterality: Bilateral;    INJECTION OF ANESTHETIC AGENT INTO SACROILIAC JOINT Bilateral 04/21/2022    Procedure: Bilateral SIJ + Bilateral Piriformis + Bilateral GT Bursa Injection;  Surgeon: Ras Caballero MD;  Location: [facility] PAIN MGT;  Service: Pain Management;  Laterality: Bilateral;    INJECTION OF ANESTHETIC AGENT INTO SACROILIAC JOINT Right 4/18/2023    Procedure: Right SIJ + Right piriformis +/- Right GT bursa injection;  Surgeon: Ras Caballero MD;  Location: [facility] PAIN MGT;  Service: Pain Management;  Laterality: Right;    INJECTION OF JOINT Bilateral 02/07/2020    Procedure: Bilateral GT bursa + Bilateral Sacroiliac Joint Injection;  Surgeon: David Desai MD;  Location: [facility] PAIN MGT;  Service: Pain Management;  Laterality: Bilateral;    INJECTION OF JOINT Bilateral 07/07/2020    Procedure: Bilateral GT bursa +SIJ injection;  Surgeon: David Desai MD;  Location: [facility] PAIN MGT;  Service: Pain Management;  Laterality: Bilateral;    INJECTION OF JOINT Right 10/21/2020    Procedure: Right piriformis + right SIJ + right GT bursa injection;  Surgeon: David Desai MD;  Location: [facility] PAIN MGT;  Service: Pain Management;  Laterality: Right;    INJECTION OF JOINT Bilateral 02/08/2021    Procedure: Bilateral GT Bursa Injection;  Surgeon: Anna Kaminski MD;  Location: [facility] PAIN MGT;  Service: Pain Management;  Laterality: Bilateral;    INJECTION OF JOINT Bilateral 04/15/2021    Procedure: Bilateral SIJ + Bilateral Piriformis + Bilateral GT Bursa Injection;  Surgeon: Flaco Frazier MD;  Location: [facility] PAIN MGT;  Service: Pain Management;  Laterality: Bilateral;    INJECTION OF JOINT Bilateral 04/21/2022    Procedure: Bilateral SIJ + Bilateral Piriformis + Bilateral GT Bursa Injection;  Surgeon: Ras Caballero MD;  Location: [facility] PAIN MGT;  Service: Pain Management;  Laterality: Bilateral;    INJECTION OF JOINT Right 10/11/2022    Procedure: right SIJ +  right piriformis + right GT bursa injection;  Surgeon: Ras Caballero MD;  Location: [facility] PAIN MGT;  Service: Pain Management;  Laterality: Right;    INJECTION OF PIRIFORMIS MUSCLE Right 06/14/2019    Procedure: Right piriformis injection;  Surgeon: David Desai MD;  Location: [facility] PAIN MGT;  Service: Pain Management;  Laterality: Right;    INJECTION OF PIRIFORMIS MUSCLE Right 10/21/2020    Procedure: Right piriformis + right SIJ + right GT bursa injection;  Surgeon: David Desai MD;  Location: [facility] PAIN MGT;  Service: Pain Management;  Laterality: Right;    INJECTION OF PIRIFORMIS MUSCLE Bilateral 04/15/2021    Procedure: Bilateral SIJ + Bilateral Piriformis + Bilateral GT Bursa Injection;  Surgeon: Flaco Frazier MD;  Location: [facility] PAIN MGT;  Service: Pain Management;  Laterality: Bilateral;    INJECTION OF PIRIFORMIS MUSCLE Bilateral 04/21/2022    Procedure: Bilateral SIJ + Bilateral Piriformis + Bilateral GT Bursa Injection;  Surgeon: Ras Caballero MD;  Location: [facility] PAIN MGT;  Service: Pain Management;  Laterality: Bilateral;    LIVER TRANSPLANT  12/2017    SELECTIVE INJECTION OF ANESTHETIC AGENT AROUND LUMBAR SPINAL NERVE ROOT BY TRANSFORAMINAL APPROACH Bilateral 10/28/2021    Procedure: BLOCK, SPINAL NERVE ROOT, LUMBAR, SELECTIVE, TRANSFORAMINAL APPROACH bilateral L4-5 and Right Knee injection with RN IV sedation;  Surgeon: Flaco Frazier MD;  Location: [facility] PAIN MGT;  Service: Pain Management;  Laterality: Bilateral;    SELECTIVE INJECTION OF ANESTHETIC AGENT AROUND LUMBAR SPINAL NERVE ROOT BY TRANSFORAMINAL APPROACH Bilateral 08/18/2022    Procedure: Bilateral L4/5 TF WILBERTO;  Surgeon: Ras Caballero MD;  Location: [facility] PAIN MGT;  Service: Pain Management;  Laterality: Bilateral;    TRANSFORAMINAL EPIDURAL INJECTION OF STEROID Right 8/15/2023    Procedure: right L5/S1 + S1 TF WILBERTO;  Surgeon: Ras Caballero MD;  Location: [facility] PAIN MGT;  Service: Pain Management;  Laterality: Right;      Social History     Socioeconomic History    Marital status: Single    Number of children: 2   Tobacco Use    Smoking status: Never    Smokeless tobacco: Never   Substance and Sexual Activity    Alcohol use: No     Comment: Currently admitted to inpatient rehab 7/2017    Drug use: No    Sexual activity: Not Currently     Social Determinants of Health     Physical Activity: Unknown (5/14/2024)    Exercise Vital Sign     Days of Exercise per Week: 5 days   Stress: No Stress Concern Present (5/14/2024)    [employer] - Occupational Stress Questionnaire     Feeling of Stress : Not at all   Housing Stability: High Risk (5/14/2024)    Housing Stability Vital Sign     Unable to Pay for Housing in the Last Year: Yes     Review of patient's allergies indicates:  No Known Allergies  Current Outpatient Medications   Medication Sig    amitriptyline (ELAVIL) 25 MG tablet TAKE 1 TO 2 TABLETS(25 TO 50 MG) BY MOUTH EVERY NIGHT AS NEEDED FOR INSOMNIA OR PAIN    atorvastatin (LIPITOR) 40 MG tablet Take 1 tablet (40 mg total) by mouth every evening.    cyclobenzaprine (FLEXERIL) 10 MG tablet TAKE 1/2 TO 1 TABLET BY MOUTH THREE TIMES DAILY AS NEEDED FOR MUSCLE SPASMS    DULoxetine (CYMBALTA) 30 MG capsule TAKE 1 CAPSULE(30 MG) BY MOUTH EVERY DAY    ergocalciferol (ERGOCALCIFEROL) 50,000 unit Cap Take 1 capsule (50,000 Units total) by mouth every 7 days.    famotidine (PEPCID) 20 MG tablet TAKE 1 TABLET(20 MG) BY MOUTH TWICE DAILY.    fluticasone propionate (FLONASE) 50 mcg/actuation nasal spray 2 sprays (100 mcg total) by Each Nostril route once daily.    folic acid (FOLVITE) 1 MG tablet Take 1 tablet (1 mg total) by mouth once daily.    furosemide (LASIX) 40 MG tablet Take 1 tablet (40 mg total) by mouth once daily.    levocetirizine (XYZAL) 5 MG tablet Take 1 tablet (5 mg total) by mouth every evening.    NIFEdipine (PROCARDIA-XL) 60 MG (OSM) 24 hr tablet Take 1 tablet (60 mg total) by mouth once  daily.    pantoprazole (PROTONIX) 40 MG tablet Take 1 tablet (40 mg total) by mouth 2 (two) times daily.    potassium chloride (KLOR-CON) 10 MEQ TbSR Take 1 tablet (10 mEq total) by mouth 2 (two) times daily.    pregabalin (LYRICA) 225 MG Cap TAKE 1 CAPSULE BY MOUTH TWICE DAILY    tacrolimus (PROGRAF) 1 MG Cap Take 2 capsules (2 mg total) by mouth every morning AND 1 capsule (1 mg total) every evening.    valsartan (DIOVAN) 80 MG tablet Take 1 tablet (80 mg total) by mouth once daily. Stop losartan     No current facility-administered medications for this visit.           Review of Systems   Constitutional:  Negative for activity change, appetite change, chills, diaphoresis, fatigue, fever and unexpected weight change.   HENT:  Negative for congestion, ear pain, postnasal drip, rhinorrhea, sinus pressure, sinus pain, sneezing, sore throat, tinnitus, trouble swallowing and voice change.    Eyes:  Negative for photophobia, pain and visual disturbance.   Respiratory:  Positive for shortness of breath. Negative for cough, chest tightness and wheezing.    Cardiovascular:  Negative for chest pain, palpitations and leg swelling.   Gastrointestinal:  Negative for abdominal distention, abdominal pain, constipation, diarrhea, nausea and vomiting.   Genitourinary:  Negative for decreased urine volume, difficulty urinating, dysuria, flank pain, frequency, hematuria and urgency.   Musculoskeletal:  Negative for arthralgias, back pain, joint swelling, neck pain and neck stiffness.   Allergic/Immunologic: Negative for immunocompromised state.   Neurological:  Negative for dizziness, tremors, seizures, syncope, facial asymmetry, speech difficulty, weakness, light-headedness, numbness and headaches.   Hematological:  Negative for adenopathy. Does not bruise/bleed easily.   Psychiatric/Behavioral:  Negative for confusion and sleep disturbance.        Objective:      Physical Exam  Vitals reviewed.   Cardiovascular:      Rate and  Rhythm: Normal rate and regular rhythm.      Pulses: Normal pulses.      Heart sounds: Normal heart sounds.   Pulmonary:      Effort: Pulmonary effort is normal.      Breath sounds: Normal breath sounds.   Neurological:      Mental Status: She is alert and oriented to person, place, and time.         Assessment:     Vitals:    06/25/24 0925   BP: 124/82   Pulse: 95   Temp: 97 °F (36.1 °C)         1. Chest pain, unspecified type    2. SOB (shortness of breath) on exertion    3. Fluid retention        Plan:   Chest pain, unspecified type  -     Ambulatory referral/consult to Cardiology; Future; Expected date: 07/02/2024    SOB (shortness of breath) on exertion  -     B-TYPE NATRIURETIC PEPTIDE; Future; Expected date: 06/25/2024  -     Ambulatory referral/consult to Cardiology; Future; Expected date: 07/02/2024    Fluid retention  -     B-TYPE NATRIURETIC PEPTIDE; Future; Expected date: 06/25/2024      Recommend low-salt reflux diet  Check BNP today as a precaution patient to see Cardiology per preference in for further workup

## 2024-06-26 ENCOUNTER — PATIENT MESSAGE (OUTPATIENT)
Dept: ENDOCRINOLOGY | Facility: CLINIC | Age: 59
End: 2024-06-26

## 2024-06-26 ENCOUNTER — HOSPITAL ENCOUNTER (OUTPATIENT)
Dept: ENDOCRINOLOGY | Facility: CLINIC | Age: 59
Discharge: HOME | End: 2024-06-26
Attending: HOSPITALIST
Payer: MEDICAID

## 2024-06-26 LAB — TACROLIMUS BLD-MCNC: 6.7 NG/ML (ref 5–15)

## 2024-06-27 ENCOUNTER — TELEPHONE (OUTPATIENT)
Dept: TRANSPLANT | Facility: CLINIC | Age: 59
End: 2024-06-27
Payer: MEDICAID

## 2024-06-27 NOTE — LETTER
June 27, 2024    Marcie Bazan  [address] [place]  [place] [address]          Dear Marcie Bazan:  MRN: [id number]    This is a follow up to your recent labs, your lab results were stable.  There are no medicine changes.  Please have your labs drawn again on 9/2/24.      If you cannot have your labs drawn on the scheduled date, it is your responsibility to call the transplant department to reschedule.  Please call [phone] and ask to speak to Mica PURVIS   for all scheduling requests.     Sincerely,    Viola SANDHUN, RN      Your Liver Transplant Coordinator    [facility]  [address] [place] [address] [phone]

## 2024-06-27 NOTE — TELEPHONE ENCOUNTER
Letter sent to patient stating: Your labs have been reviewed by your Transplant physician, no action required. Next labs due 9/2/2024      ----- Message -----  From: Joselin Wallace MD  Sent: 6/26/2024   3:03 PM CDT  To: [facility]    Reviewed, nothing to do; repeat per routine

## 2024-06-28 ENCOUNTER — TELEPHONE (OUTPATIENT)
Dept: PULMONOLOGY | Facility: CLINIC | Age: 59
End: 2024-06-28
Payer: MEDICAID

## 2024-06-28 NOTE — TELEPHONE ENCOUNTER
Returned patients call back. Scheduled patients appt as requested. Patient accepted appt. Sent appt to access to be scheduled,----- Message from Kerri Castaneda sent at 6/28/2024  2:56 PM CDT -----  Regarding: Appointment Requset  Name of Who is Calling:Marcie           What is the request in detail:Pt is requesting a call back to schedule an appointment.            Can the clinic reply by [facility]:No           What Number to Call Back if not in [facility]:[phone]

## 2024-07-11 ENCOUNTER — TELEPHONE (OUTPATIENT)
Dept: INTERNAL MEDICINE | Facility: CLINIC | Age: 59
End: 2024-07-11
Payer: MEDICAID

## 2024-07-11 NOTE — TELEPHONE ENCOUNTER
FW: Appointment Request   Karyna Hancock   Sent:   6:46 AM   To: [facility] Staff      Marcie Bazan   MRN: [id number] : 1965   Pt Work: [phone]     Entered: [phone]        Message       ----- Message -----   From: Marcie Bazan   Sent: 7/10/2024   6:06 PM CDT   To: [facility]   Subject: Appointment Request                                Appointment Request From: Marcie Bazan      With Provider: ETHAN Munoz [[address]]      Preferred Date Range: Any      Preferred Times: Any Time      Reason for visit: Body blotting up more everyday till its uncomfortable and miserable if I try to do any activity I become so tired and nauseous      Comments:   Try to find out whats going on am I blotting till it hurts

## 2024-07-12 ENCOUNTER — OFFICE VISIT (OUTPATIENT)
Dept: INTERNAL MEDICINE | Facility: CLINIC | Age: 59
End: 2024-07-12
Payer: MEDICAID

## 2024-07-12 DIAGNOSIS — R10.9 ABDOMINAL PAIN, UNSPECIFIED ABDOMINAL LOCATION: ICD-10-CM

## 2024-07-12 DIAGNOSIS — R14.0 ABDOMINAL DISTENSION: Primary | ICD-10-CM

## 2024-07-12 NOTE — PROGRESS NOTES
TELEMEDICINE VIRTUAL VISIT    Visit Details: This visit was a telemedicine virtual visit with synchronous audio and video. Marcie reported that her location at the time of this visit was in the state [facility]. Marcie had the choice to come into office to receive these medical services. Marcie chose and consented to receive these medical services by telemedicine.   Subjective:       Patient ID: Marcie Bazan is a 58 y.o. female.    Chief Complaint:   Abdominal Pain  This is a new problem. The current episode started in the past 7 days. The onset quality is gradual. The problem occurs 2 to 4 times per day. The most recent episode lasted 2 hours. The problem has been waxing and waning. The pain is located in the generalized abdominal region. The pain is at a severity of 10/10. The pain is severe. The quality of the pain is tearing. Associated symptoms include arthralgias, belching, constipation, diarrhea, flatus, frequency, headaches, myalgias, nausea and vomiting. Pertinent negatives include no anorexia, dysuria, fever, hematochezia, hematuria, melena or weight loss. She has tried antacids for the symptoms. Her past medical history is significant for GERD.       Pt reports new onset severely swollen abdomen. States she can barely walk.       Past Medical History:   Diagnosis Date    Alcohol abuse     Alcoholic hepatitis     Anemia of chronic disease     Arthritis     generialized    Cancer 12/26/2017    liver    Coagulopathy     Dyspnea on exertion 3/26/2020    Encounter for blood transfusion     End stage liver disease     History of alcohol abuse     Hyperlipidemia     Hypersomnia 9/11/2020    Hypertension     Hyponatremia     Liver cirrhosis     Liver transplant candidate 12/26/2017    Obesity (BMI 30-39.9) 1/5/2016    Prediabetes 4/22/2023    Sleep apnea      Past Surgical History:   Procedure Laterality Date    BILATERAL SALPINGO-OOPHORECTOMY (BSO) Bilateral 1986    with hysterectomy done for abnormal cells     BREAST BIOPSY Left     benign    BREAST LUMPECTOMY      patient is unsure of date or laterality    CATARACT EXTRACTION W/  INTRAOCULAR LENS IMPLANT Right 9/20/2023    Procedure: EXTRACTION, CATARACT, WITH IOL INSERTION;  Surgeon: Kim Mullen MD;  Location: [facility] OR;  Service: Ophthalmology;  Laterality: Right;  Per Elidia Viveros change patient from left eye to right on 9/11 @ 2:38p    CATARACT EXTRACTION W/  INTRAOCULAR LENS IMPLANT Left 10/11/2023    Procedure: EXTRACTION, CATARACT, WITH IOL INSERTION;  Surgeon: Kim Mullen MD;  Location: [facility] OR;  Service: Ophthalmology;  Laterality: Left;  Per Idalia Rosa change the patient to Left and to 10/11 on 9/8 @8:49Am    CHOLECYSTECTOMY      COLONOSCOPY N/A 12/01/2017    Procedure: COLONOSCOPY;  Surgeon: Filemon Fuentes MD;  Location: [facility] ([address] [place] [address]);  Service: Endoscopy;  Laterality: N/A;    COLONOSCOPY N/A 12/05/2017    Procedure: COLONOSCOPY;  Surgeon: José Chavira MD;  Location: [facility] ([address] [place] [address]);  Service: Endoscopy;  Laterality: N/A;    COLONOSCOPY N/A 12/12/2022    Procedure: COLONOSCOPY;  Surgeon: Gogo Brown MD;  Location: [facility] ENDO;  Service: Endoscopy;  Laterality: N/A;    EPIDURAL STEROID INJECTION N/A 2/28/2023    Procedure: L4/5 IL WILBERTO (with right paramedian approach);  Surgeon: Ras Caballero MD;  Location: [facility] PAIN MGT;  Service: Pain Management;  Laterality: N/A;    EPIDURAL STEROID INJECTION INTO CERVICAL SPINE N/A 01/04/2022    Procedure: C5/6 IL WILBERTO;  Surgeon: Ras Caballero MD;  Location: [facility] PAIN MGT;  Service: Pain Management;  Laterality: N/A;    ESOPHAGOGASTRODUODENOSCOPY N/A 9/14/2023    Procedure: EGD (ESOPHAGOGASTRODUODENOSCOPY);  Surgeon: Angie Zimmerman MD;  Location: [facility] ENDO;  Service: Endoscopy;  Laterality: N/A;    EXAMINATION UNDER ANESTHESIA N/A 02/18/2020    Procedure: EXAM UNDER ANESTHESIA;  Surgeon: Alden Horowitz MD;  Location: [facility] OR;  Service: General;  Laterality: N/A;    EXCISIONAL  HEMORRHOIDECTOMY N/A 02/18/2020    Procedure: HEMORRHOIDECTOMY;  Surgeon: Alden Horowitz MD;  Location: [facility] OR;  Service: General;  Laterality: N/A;    HYSTERECTOMY  1986    due to abnormal paps - ovaries were removed    INJECTION OF ANESTHETIC AGENT AROUND PUDENDAL NERVE N/A 02/18/2020    Procedure: BLOCK, NERVE, PUDENDAL;  Surgeon: Alden Horowitz MD;  Location: [facility] OR;  Service: General;  Laterality: N/A;    INJECTION OF ANESTHETIC AGENT INTO SACROILIAC JOINT Right 06/14/2019    Procedure: right Sacroiliac Joint Injection;  Surgeon: David Desai MD;  Location: [facility] PAIN MGT;  Service: Pain Management;  Laterality: Right;    INJECTION OF ANESTHETIC AGENT INTO SACROILIAC JOINT Bilateral 02/07/2020    Procedure: Bilateral GT bursa + Bilateral Sacroiliac Joint Injection;  Surgeon: David Desai MD;  Location: [facility] PAIN MGT;  Service: Pain Management;  Laterality: Bilateral;    INJECTION OF ANESTHETIC AGENT INTO SACROILIAC JOINT Bilateral 07/07/2020    Procedure: Bilateral GT bursa +SIJ injection;  Surgeon: David Desai MD;  Location: [facility] PAIN MGT;  Service: Pain Management;  Laterality: Bilateral;    INJECTION OF ANESTHETIC AGENT INTO SACROILIAC JOINT Right 10/21/2020    Procedure: Right piriformis + right SIJ + right GT bursa injection;  Surgeon: David Desai MD;  Location: [facility] PAIN MGT;  Service: Pain Management;  Laterality: Right;    INJECTION OF ANESTHETIC AGENT INTO SACROILIAC JOINT Bilateral 04/15/2021    Procedure: Bilateral SIJ + Bilateral Piriformis + Bilateral GT Bursa Injection;  Surgeon: Flaco Frazier MD;  Location: [facility] PAIN MGT;  Service: Pain Management;  Laterality: Bilateral;    INJECTION OF ANESTHETIC AGENT INTO SACROILIAC JOINT Bilateral 04/21/2022    Procedure: Bilateral SIJ + Bilateral Piriformis + Bilateral GT Bursa Injection;  Surgeon: Ras Caballero MD;  Location: [facility] PAIN MGT;  Service: Pain Management;  Laterality: Bilateral;    INJECTION OF ANESTHETIC AGENT  INTO SACROILIAC JOINT Right 4/18/2023    Procedure: Right SIJ + Right piriformis +/- Right GT bursa injection;  Surgeon: Ras Caballero MD;  Location: [facility] PAIN MGT;  Service: Pain Management;  Laterality: Right;    INJECTION OF JOINT Bilateral 02/07/2020    Procedure: Bilateral GT bursa + Bilateral Sacroiliac Joint Injection;  Surgeon: David Desai MD;  [facility];  Service: Pain Management;  Laterality: Bilateral;    INJECTION OF JOINT Bilateral 07/07/2020    Procedure: Bilateral GT bursa +SIJ injection;  Surgeon: David Desai MD;  Location: [facility] PAIN MGT;  Service: Pain Management;  Laterality: Bilateral;    INJECTION OF JOINT Right 10/21/2020    Procedure: Right piriformis + right SIJ + right GT bursa injection;  Surgeon: David Desai MD;  Location: [facility] PAIN MGT;  Service: Pain Management;  Laterality: Right;    INJECTION OF JOINT Bilateral 02/08/2021    Procedure: Bilateral GT Bursa Injection;  Surgeon: Anna Kaminski MD;  Location: [facility] PAIN MGT;  Service: Pain Management;  Laterality: Bilateral;    INJECTION OF JOINT Bilateral 04/15/2021    Procedure: Bilateral SIJ + Bilateral Piriformis + Bilateral GT Bursa Injection;  Surgeon: Flaco Frazier MD;  Location: [facility] PAIN MGT;  Service: Pain Management;  Laterality: Bilateral;    INJECTION OF JOINT Bilateral 04/21/2022    Procedure: Bilateral SIJ + Bilateral Piriformis + Bilateral GT Bursa Injection;  Surgeon: Ras Caballero MD;  Location: [facility] PAIN MGT;  Service: Pain Management;  Laterality: Bilateral;    INJECTION OF JOINT Right 10/11/2022    Procedure: right SIJ + right piriformis + right GT bursa injection;  Surgeon: Ras Caballero MD;  Location: [facility] PAIN MGT;  Service: Pain Management;  Laterality: Right;    INJECTION OF PIRIFORMIS MUSCLE Right 06/14/2019    Procedure: Right piriformis injection;  Surgeon: David Desai MD;  Location: [facility] PAIN MGT;  Service: Pain Management;  Laterality: Right;    INJECTION OF  PIRIFORMIS MUSCLE Right 10/21/2020    Procedure: Right piriformis + right SIJ + right GT bursa injection;  Surgeon: David Desai MD;  Location: [facility] PAIN MGT;  Service: Pain Management;  Laterality: Right;    INJECTION OF PIRIFORMIS MUSCLE Bilateral 04/15/2021    Procedure: Bilateral SIJ + Bilateral Piriformis + Bilateral GT Bursa Injection;  Surgeon: Flaco Frazier MD;  Location: [facility] PAIN MGT;  Service: Pain Management;  Laterality: Bilateral;    INJECTION OF PIRIFORMIS MUSCLE Bilateral 04/21/2022    Procedure: Bilateral SIJ + Bilateral Piriformis + Bilateral GT Bursa Injection;  Surgeon: Ras Caballero MD;  Location: [facility] PAIN MGT;  Service: Pain Management;  Laterality: Bilateral;    LIVER TRANSPLANT  12/2017    SELECTIVE INJECTION OF ANESTHETIC AGENT AROUND LUMBAR SPINAL NERVE ROOT BY TRANSFORAMINAL APPROACH Bilateral 10/28/2021    Procedure: BLOCK, SPINAL NERVE ROOT, LUMBAR, SELECTIVE, TRANSFORAMINAL APPROACH bilateral L4-5 and Right Knee injection with RN IV sedation;  Surgeon: Flaco Frazier MD;  Location: [facility] PAIN MGT;  Service: Pain Management;  Laterality: Bilateral;    SELECTIVE INJECTION OF ANESTHETIC AGENT AROUND LUMBAR SPINAL NERVE ROOT BY TRANSFORAMINAL APPROACH Bilateral 08/18/2022    Procedure: Bilateral L4/5 TF WILBERTO;  Surgeon: Ras Caballero MD;  Location: [facility] PAIN MGT;  Service: Pain Management;  Laterality: Bilateral;    TRANSFORAMINAL EPIDURAL INJECTION OF STEROID Right 8/15/2023    Procedure: right L5/S1 + S1 TF WILBERTO;  Surgeon: Ras Caballero MD;  Location: [facility] PAIN MGT;  Service: Pain Management;  Laterality: Right;     Social History     Socioeconomic History    Marital status: Single    Number of children: 2   Tobacco Use    Smoking status: Never    Smokeless tobacco: Never   Substance and Sexual Activity    Alcohol use: No     Comment: Currently admitted to inpatient rehab 7/2017    Drug use: No    Sexual activity: Not Currently     Social Determinants of Health     Financial  Resource Strain: High Risk (6/26/2024)    Overall Financial Resource Strain (CARDIA)     Difficulty of Paying Living Expenses: Hard   Food Insecurity: Food Insecurity Present (6/26/2024)    Hunger Vital Sign     Worried About Running Out of Food in the Last Year: Often true   Physical Activity: Unknown (6/26/2024)    Exercise Vital Sign     Days of Exercise per Week: 0 days   Stress: No Stress Concern Present (5/14/2024)    [employer] - Occupational Stress Questionnaire     Feeling of Stress : Not at all   Housing Stability: High Risk (6/26/2024)    Housing Stability Vital Sign     Unable to Pay for Housing in the Last Year: Yes     Review of patient's allergies indicates:  No Known Allergies  Current Outpatient Medications   Medication Sig    amitriptyline (ELAVIL) 25 MG tablet TAKE 1 TO 2 TABLETS(25 TO 50 MG) BY MOUTH EVERY NIGHT AS NEEDED FOR INSOMNIA OR PAIN    atorvastatin (LIPITOR) 40 MG tablet Take 1 tablet (40 mg total) by mouth every evening.    cyclobenzaprine (FLEXERIL) 10 MG tablet TAKE 1/2 TO 1 TABLET BY MOUTH THREE TIMES DAILY AS NEEDED FOR MUSCLE SPASMS    DULoxetine (CYMBALTA) 30 MG capsule TAKE 1 CAPSULE(30 MG) BY MOUTH EVERY DAY    ergocalciferol (ERGOCALCIFEROL) 50,000 unit Cap Take 1 capsule (50,000 Units total) by mouth every 7 days.    famotidine (PEPCID) 20 MG tablet TAKE 1 TABLET(20 MG) BY MOUTH TWICE DAILY.    fluticasone propionate (FLONASE) 50 mcg/actuation nasal spray 2 sprays (100 mcg total) by Each Nostril route once daily.    folic acid (FOLVITE) 1 MG tablet Take 1 tablet (1 mg total) by mouth once daily.    furosemide (LASIX) 40 MG tablet Take 1 tablet (40 mg total) by mouth once daily.    levocetirizine (XYZAL) 5 MG tablet Take 1 tablet (5 mg total) by mouth every evening.    NIFEdipine (PROCARDIA-XL) 60 MG (OSM) 24 hr tablet Take 1 tablet (60 mg total) by mouth once daily.    pantoprazole (PROTONIX) 40 MG tablet Take 1 tablet (40 mg total) by mouth 2  (two) times daily.    potassium chloride (KLOR-CON) 10 MEQ TbSR Take 1 tablet (10 mEq total) by mouth 2 (two) times daily.    pregabalin (LYRICA) 225 MG Cap TAKE 1 CAPSULE BY MOUTH TWICE DAILY    tacrolimus (PROGRAF) 1 MG Cap Take 2 capsules (2 mg total) by mouth every morning AND 1 capsule (1 mg total) every evening.    valsartan (DIOVAN) 80 MG tablet Take 1 tablet (80 mg total) by mouth once daily. Stop losartan     No current facility-administered medications for this visit.           Review of Systems   Constitutional:  Negative for fever and weight loss.   Gastrointestinal:  Positive for abdominal distention, abdominal pain, constipation, diarrhea, flatus, nausea and vomiting. Negative for anorexia, hematochezia and melena.   Genitourinary:  Positive for frequency. Negative for dysuria and hematuria.   Musculoskeletal:  Positive for arthralgias and myalgias.   Neurological:  Positive for headaches.       Objective:      Physical Exam      Pt appears very uncomfortable, grimacing  Her abdomen appears very distended was not like this when she was seen in office a couple of weeks ago.  Assessment:   There were no vitals filed for this visit.      1. Abdominal distension    2. Abdominal pain, unspecified abdominal location        Plan:   Abdominal distension    Abdominal pain, unspecified abdominal location          Pt advised to go to urgent care/ER for immediate eval/treatment. She verbalizes understanding and states she will get her daughter to bring her

## 2024-07-15 ENCOUNTER — OFFICE VISIT (OUTPATIENT)
Dept: HEPATOLOGY | Facility: CLINIC | Age: 59
End: 2024-07-15
Payer: MEDICAID

## 2024-07-15 VITALS
HEIGHT: 61 IN | HEART RATE: 90 BPM | BODY MASS INDEX: 41.66 KG/M2 | SYSTOLIC BLOOD PRESSURE: 136 MMHG | WEIGHT: 220.69 LBS | DIASTOLIC BLOOD PRESSURE: 83 MMHG

## 2024-07-15 DIAGNOSIS — D84.9 IMMUNOSUPPRESSION: Primary | ICD-10-CM

## 2024-07-15 DIAGNOSIS — Z94.4 LIVER REPLACED BY TRANSPLANT: ICD-10-CM

## 2024-07-15 DIAGNOSIS — E83.42 HYPOMAGNESEMIA: ICD-10-CM

## 2024-07-15 DIAGNOSIS — E66.01 MORBID OBESITY: ICD-10-CM

## 2024-07-15 PROCEDURE — 3079F DIAST BP 80-89 MM HG: CPT | Mod: CPTII,,, | Performed by: INTERNAL MEDICINE

## 2024-07-15 PROCEDURE — 4010F ACE/ARB THERAPY RXD/TAKEN: CPT | Mod: CPTII,,, | Performed by: INTERNAL MEDICINE

## 2024-07-15 PROCEDURE — 3075F SYST BP GE 130 - 139MM HG: CPT | Mod: CPTII,,, | Performed by: INTERNAL MEDICINE

## 2024-07-15 PROCEDURE — 99999 PR PBB SHADOW E&M-EST. PATIENT-LVL IV: CPT | Mod: PBBFAC,,, | Performed by: INTERNAL MEDICINE

## 2024-07-15 PROCEDURE — 3008F BODY MASS INDEX DOCD: CPT | Mod: CPTII,,, | Performed by: INTERNAL MEDICINE

## 2024-07-15 PROCEDURE — 99214 OFFICE O/P EST MOD 30 MIN: CPT | Mod: PBBFAC | Performed by: INTERNAL MEDICINE

## 2024-07-15 PROCEDURE — 1160F RVW MEDS BY RX/DR IN RCRD: CPT | Mod: CPTII,,, | Performed by: INTERNAL MEDICINE

## 2024-07-15 PROCEDURE — 1159F MED LIST DOCD IN RCRD: CPT | Mod: CPTII,,, | Performed by: INTERNAL MEDICINE

## 2024-07-15 PROCEDURE — 99213 OFFICE O/P EST LOW 20 MIN: CPT | Mod: S$PBB,,, | Performed by: INTERNAL MEDICINE

## 2024-07-15 RX ORDER — LANOLIN ALCOHOL/MO/W.PET/CERES
1 CREAM (GRAM) TOPICAL DAILY
Qty: 90 TABLET | Refills: 3 | Status: SHIPPED | OUTPATIENT
Start: 2024-07-15

## 2024-07-15 NOTE — PROGRESS NOTES
Transplant Hepatology  Liver Transplant Recipient Follow-up    Transplant Date: 12/26/2017  UNOS Native Liver Dx: Alcoholic Cirrhosis    Marcie is here for follow up of her liver transplant.    ORGAN: LIVER  Whole or Partial: whole liver  Donor Type: donation after brain death  CDC High Risk: no  Donor CMV Status: Negative  Donor HCV Status: Negative  Donor HBcAb: Negative  Biliary Anastomosis: end to end  Arterial Anatomy: standard  IVC reconstruction: cavaplasty piggyback  Portal vein status: patent  .    Subjective:     Interval History:  Currently, she is doing with difficulty. Current complaints include chronic issues related to back pain for which she is being evaluated by Neurology.  Otherwise she acknowledges continued weight gain.  She has had difficulty losing weight.  There been no major liver related issues over the past year.  She has been compliant with medications.  She did stop her magnesium as she thought treatment had ended.    Review of Systems    Objective:     Physical Exam  Vitals reviewed.   Constitutional:       General: She is not in acute distress.     Appearance: She is well-developed.   HENT:      Head: Normocephalic and atraumatic.      Mouth/Throat:      Pharynx: No oropharyngeal exudate.   Eyes:      General: No scleral icterus.        Right eye: No discharge.         Left eye: No discharge.      Conjunctiva/sclera: Conjunctivae normal.      Pupils: Pupils are equal, round, and reactive to light.   Pulmonary:      Effort: Pulmonary effort is normal. No respiratory distress.      Breath sounds: No wheezing.   Abdominal:      General: There is no distension.      Palpations: Abdomen is soft.      Tenderness: There is no abdominal tenderness.   Musculoskeletal:      Right lower leg: No edema.      Left lower leg: No edema.   Neurological:      Mental Status: She is alert and oriented to person, place, and time.   Psychiatric:         Behavior: Behavior normal.         WBC   Date Value Ref  Range Status   06/25/2024 12.14 3.90 - 12.70 K/uL Final     Hemoglobin   Date Value Ref Range Status   06/25/2024 12.6 12.0 - 16.0 g/dL Final     POC Hematocrit   Date Value Ref Range Status   12/26/2017 28 (L) 36 - 54 %PCV Final     Hematocrit   Date Value Ref Range Status   06/25/2024 39.2 37.0 - 48.5 % Final     Platelets   Date Value Ref Range Status   06/25/2024 247 150 - 450 K/uL Final     BUN   Date Value Ref Range Status   06/25/2024 13 6 - 20 mg/dL Final     Creatinine   Date Value Ref Range Status   06/25/2024 0.9 0.5 - 1.4 mg/dL Final     Glucose   Date Value Ref Range Status   06/25/2024 87 70 - 110 mg/dL Final     Calcium   Date Value Ref Range Status   06/25/2024 9.1 8.7 - 10.5 mg/dL Final     Sodium   Date Value Ref Range Status   06/25/2024 142 136 - 145 mmol/L Final     Potassium   Date Value Ref Range Status   06/25/2024 3.7 3.5 - 5.1 mmol/L Final     Chloride   Date Value Ref Range Status   06/25/2024 106 95 - 110 mmol/L Final     Magnesium   Date Value Ref Range Status   06/13/2024 1.4 (L) 1.6 - 2.6 mg/dL Final     AST   Date Value Ref Range Status   06/25/2024 9 (L) 10 - 40 U/L Final     ALT   Date Value Ref Range Status   06/25/2024 7 (L) 10 - 44 U/L Final     Alkaline Phosphatase   Date Value Ref Range Status   06/25/2024 193 (H) 55 - 135 U/L Final     Total Bilirubin   Date Value Ref Range Status   06/25/2024 0.4 0.1 - 1.0 mg/dL Final     Comment:     For infants and newborns, interpretation of results should be based  on gestational age, weight and in agreement with clinical  observations.    Premature Infant recommended reference ranges:  Up to 24 hours.............<8.0 mg/dL  Up to 48 hours............<12.0 mg/dL  3-5 days..................<15.0 mg/dL  6-29 days.................<15.0 mg/dL       Albumin   Date Value Ref Range Status   06/25/2024 3.5 3.5 - 5.2 g/dL Final     INR   Date Value Ref Range Status   02/27/2020 1.0 0.8 - 1.2 Final     Comment:     Coumadin Therapy:  2.0 - 3.0 for  INR for all indicators except mechanical heart valves  and antiphospholipid syndromes which should use 2.5 - 3.5.       Lab Results   Component Value Date    TACROLIMUS 6.7 06/25/2024           Assessment/Plan:     1. Immunosuppression    2. Liver replaced by transplant    3. Morbid obesity    4. Hypomagnesemia        Immunosuppression  -Continue current IS    Liver transplant-good allograft function  -Continue with routine lab monitoring  -Continue with PCP f/u  -Cancer screening- patient advised about increased risks of cancer and need for skin protection, skin exam and regular age appropriate cancer screening    Morbid obesity-patient has had trouble losing weight; advised she continue to work closely with the primary care doctor regarding weight loss.  Discussed diet changes for weight loss.    Hypomagnesemia  -advised that she restart magnesium at least daily given her recent level was low  -will ask coordinator to add magnesium monitor next set of labs    Return to clinic in 1    Joselin Wallace MD           [facility] Patient Status  Functional Status: 70% - Cares for self: unable to carry on normal activity or active work  Physical Capacity: Limited Mobility

## 2024-07-23 ENCOUNTER — PATIENT MESSAGE (OUTPATIENT)
Dept: HEPATOLOGY | Facility: CLINIC | Age: 59
End: 2024-07-23
Payer: MEDICAID

## 2024-07-23 DIAGNOSIS — I10 ESSENTIAL HYPERTENSION: ICD-10-CM

## 2024-07-23 DIAGNOSIS — K21.00 GASTROESOPHAGEAL REFLUX DISEASE WITH ESOPHAGITIS WITHOUT HEMORRHAGE: Chronic | ICD-10-CM

## 2024-07-23 DIAGNOSIS — R05.3 CHRONIC COUGH: ICD-10-CM

## 2024-07-23 RX ORDER — VALSARTAN 80 MG/1
80 TABLET ORAL DAILY
Qty: 90 TABLET | Refills: 1 | Status: SHIPPED | OUTPATIENT
Start: 2024-07-23 | End: 2025-01-19

## 2024-07-23 RX ORDER — PANTOPRAZOLE SODIUM 40 MG/1
40 TABLET, DELAYED RELEASE ORAL 2 TIMES DAILY
Qty: 180 TABLET | Refills: 1 | Status: SHIPPED | OUTPATIENT
Start: 2024-07-23 | End: 2025-01-19

## 2024-07-24 ENCOUNTER — PATIENT MESSAGE (OUTPATIENT)
Dept: HEPATOLOGY | Facility: CLINIC | Age: 59
End: 2024-07-24
Payer: MEDICAID

## 2024-07-25 ENCOUNTER — HOSPITAL ENCOUNTER (OUTPATIENT)
Dept: ENDOCRINOLOGY | Facility: CLINIC | Age: 59
Discharge: HOME | End: 2024-07-25
Attending: HOSPITALIST
Payer: MEDICAID

## 2024-07-25 DIAGNOSIS — E04.2 MULTINODULAR THYROID: Primary | ICD-10-CM

## 2024-07-25 PROCEDURE — 88173 CYTOPATH EVAL FNA REPORT: CPT | Mod: 59 | Performed by: PATHOLOGY

## 2024-07-25 PROCEDURE — 10005 FNA BX W/US GDN 1ST LES: CPT | Mod: ,,, | Performed by: INTERNAL MEDICINE

## 2024-07-25 PROCEDURE — 10006 FNA BX W/US GDN EA ADDL: CPT | Mod: ,,, | Performed by: INTERNAL MEDICINE

## 2024-07-26 LAB
FINAL PATHOLOGIC DIAGNOSIS: ABNORMAL
FINAL PATHOLOGIC DIAGNOSIS: NORMAL
Lab: ABNORMAL
Lab: NORMAL

## 2024-07-29 ENCOUNTER — PATIENT MESSAGE (OUTPATIENT)
Dept: ENDOCRINOLOGY | Facility: CLINIC | Age: 59
End: 2024-07-29
Payer: MEDICAID

## 2024-08-01 ENCOUNTER — PATIENT MESSAGE (OUTPATIENT)
Dept: ENDOCRINOLOGY | Facility: CLINIC | Age: 59
End: 2024-08-01
Payer: MEDICAID

## 2024-08-01 DIAGNOSIS — E55.9 VITAMIN D DEFICIENCY: Chronic | ICD-10-CM

## 2024-08-08 ENCOUNTER — PATIENT MESSAGE (OUTPATIENT)
Dept: INTERNAL MEDICINE | Facility: CLINIC | Age: 59
End: 2024-08-08
Payer: MEDICAID

## 2024-08-08 RX ORDER — ERGOCALCIFEROL 1.25 MG/1
50000 CAPSULE ORAL
Qty: 12 CAPSULE | Refills: 3 | Status: SHIPPED | OUTPATIENT
Start: 2024-08-08

## 2024-08-12 ENCOUNTER — OFFICE VISIT (OUTPATIENT)
Dept: PAIN MEDICINE | Facility: CLINIC | Age: 59
End: 2024-08-12
Payer: MEDICAID

## 2024-08-12 VITALS — HEIGHT: 61 IN | BODY MASS INDEX: 41.7 KG/M2

## 2024-08-12 DIAGNOSIS — G89.4 CHRONIC PAIN SYNDROME: ICD-10-CM

## 2024-08-12 DIAGNOSIS — G95.9 CERVICAL MYELOPATHY: Primary | ICD-10-CM

## 2024-08-12 DIAGNOSIS — G47.01 INSOMNIA SECONDARY TO CHRONIC PAIN: ICD-10-CM

## 2024-08-12 DIAGNOSIS — G95.9 CERVICAL MYELOPATHY: ICD-10-CM

## 2024-08-12 DIAGNOSIS — M79.18 PIRIFORMIS MUSCLE PAIN: ICD-10-CM

## 2024-08-12 DIAGNOSIS — M54.16 BILATERAL LUMBAR RADICULOPATHY: ICD-10-CM

## 2024-08-12 DIAGNOSIS — M54.16 LUMBAR RADICULOPATHY: ICD-10-CM

## 2024-08-12 DIAGNOSIS — M50.30 DDD (DEGENERATIVE DISC DISEASE), CERVICAL: ICD-10-CM

## 2024-08-12 DIAGNOSIS — G89.29 INSOMNIA SECONDARY TO CHRONIC PAIN: ICD-10-CM

## 2024-08-12 PROCEDURE — 99214 OFFICE O/P EST MOD 30 MIN: CPT | Mod: 95,,, | Performed by: PHYSICIAN ASSISTANT

## 2024-08-12 RX ORDER — DULOXETIN HYDROCHLORIDE 30 MG/1
CAPSULE, DELAYED RELEASE ORAL
Qty: 30 CAPSULE | Refills: 5 | Status: SHIPPED | OUTPATIENT
Start: 2024-08-12

## 2024-08-12 RX ORDER — CYCLOBENZAPRINE HCL 10 MG
TABLET ORAL
Qty: 90 TABLET | Refills: 5 | Status: SHIPPED | OUTPATIENT
Start: 2024-08-12

## 2024-08-12 RX ORDER — PREGABALIN 225 MG/1
CAPSULE ORAL
Qty: 60 CAPSULE | Refills: 5 | Status: SHIPPED | OUTPATIENT
Start: 2024-08-12

## 2024-08-12 RX ORDER — AMITRIPTYLINE HYDROCHLORIDE 25 MG/1
TABLET, FILM COATED ORAL
Qty: 60 TABLET | Refills: 5 | Status: SHIPPED | OUTPATIENT
Start: 2024-08-12

## 2024-08-12 RX ORDER — PREGABALIN 225 MG/1
CAPSULE ORAL
Qty: 60 CAPSULE | Refills: 2 | Status: CANCELLED | OUTPATIENT
Start: 2024-08-12

## 2024-08-12 NOTE — PROGRESS NOTES
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 capsule      amitriptyline (ELAVIL) 25 MG tablet      3. Lumbar radiculopathy  M54.16 724.4 cyclobenzaprine (FLEXERIL) 10 MG tablet      DULoxetine (CYMBALTA) 30 MG capsule      amitriptyline (ELAVIL) 25 MG tablet      4. DDD (degenerative disc disease), cervical  M50.30 722.4 cyclobenzaprine (FLEXERIL) 10 MG tablet      DULoxetine (CYMBALTA) 30 MG capsule      amitriptyline (ELAVIL) 25 MG tablet      5. Insomnia secondary to chronic pain  G89.29 338.29 cyclobenzaprine (FLEXERIL) 10 MG tablet    G47.01 327.01 DULoxetine (CYMBALTA) 30 MG capsule      amitriptyline (ELAVIL) 25 MG tablet      6. Bilateral lumbar radiculopathy  M54.16 724.4 cyclobenzaprine (FLEXERIL) 10 MG tablet      DULoxetine (CYMBALTA) 30 MG capsule      amitriptyline (ELAVIL) 25 MG tablet      7. Piriformis muscle pain  M79.18 729.1 cyclobenzaprine (FLEXERIL) 10 MG tablet      DULoxetine (CYMBALTA) 30 MG capsule      amitriptyline (ELAVIL) 25 MG tablet        2. Pain Generators / Etiology :  Cervical spondylosis, lumbar spondylosis, lumbar radiculopathy, left hip pain.  3. Failed Meds (E- Effective, NE- Not Effective):  Gabapentin-E, Robaxin-effective  4. Physical Therapy - has been participating since December 2017  5. Psychological comorbidities -  none  6. Anticoagulants / Antiplatelets:  None       Plan:  - Interventional: None at this time.   - S/p right L5/S1 + S1 TF WILBERTO on 8/15/23 with  limited pain relief.   - S/p Right SIJ + Right piriformis + Right GT bursa injection on 4/18/23 with limited pain relief   - S/p L4/5 IL WILBERTO (with right paramedian approach) on 2/28/23 (4 weeks ago) with limited pain relief.  - S/p right SIJ + right piriformis + right GT bursa injection on 10/11/22 with 75% pain relief, still reporting 9/10 pain.   - S/p Bilateral L4/5 TF WILBERTO on 8/18/22 with 85% pain relief - now localized right SIJ pain.   - S/p bilateral SIJ + bilateral GT bursa + bilateral piriformis injection on 4/21/22 with limited pain relief.  - S/p C5/6 IL WILBERTO on 1/4/22 with 100% pain relief   - S/p bilateral L4/5 TF WILBERTO on 10/28/21 with 90% pain relief   - S/p Bilateral SIJ + Bilateral Piriformis + Bilateral GT Bursa Injection on 4/15/21 with 75% pain relief - but continuing to have leg pain.  - S/p bilateral GT bursa injection on 02/08/2021 with 10% pain relief with Dr. Kaminski.  She feels pain is worse than prior to the procedure.  - S/p right SIJ + piriformis + GT bursa injection on 10/21/20 with 90% pain relief.     - Anticoagulation use: None.     -Pharmacologic:   - Refill Lyrica 225mg BID x 6 months.   - Refill amitriptyline 25-50mg QHS (Increased at previous visit).  - Refill Flexeril 10mg to take 1/2 to 1 tab TID PRN.  - refill Cymbalta 30mg QD - started at previous visit, which was initially helping some. Consider Increase.  - Consider retrying naltrexone -- although too expensive in the past.  - No NSAIDs due to h/o transplant.     - LA  reviewed and appropriate.         -Rehabilitative: Continue exercises and activities as tolerated. She went to PT, but she felt pain worse when she got home.    -Diagnostic: BLE EMG/NCS reviewed with patient previously.    - Consult/ Referral:   - Continue follow up with [facility]; has appointment today 8/12/2024.  - Continue follow up with [facility] for left knee pain.   - Previously placed referral to Neurology for new onset headaches for a few  months with associated dizziness & nausea.  She saw primary care who told her to ask our dept about the headaches. Per PCP note-believed to be occipital neuralgia. Headaches have resolved, so will hold off for now.    - Follow up:  6 months follow-up - virtual visit     - Patient Questions: Answered all of the patient's questions regarding diagnosis, therapy, and treatment.    - This condition does not require this patient to take time off of work, and the primary goal of our Pain Management services is to improve the patient's functional capacity.   - I discussed the risks, benefits, and alternatives to potential treatment options. All questions and concerns were fully addressed today in clinic.         Emilee Buitrago PA-C  Interventional Pain Management - [facility]    Disclaimer:  This note was prepared using voice recognition system and is likely to have sound alike errors that may have been overlooked even after proof reading.  Please call me with any questions.

## 2024-08-13 ENCOUNTER — OFFICE VISIT (OUTPATIENT)
Dept: PULMONOLOGY | Facility: CLINIC | Age: 59
End: 2024-08-13
Payer: MEDICAID

## 2024-08-13 VITALS
HEIGHT: 64 IN | DIASTOLIC BLOOD PRESSURE: 86 MMHG | WEIGHT: 226.19 LBS | HEART RATE: 84 BPM | OXYGEN SATURATION: 99 % | RESPIRATION RATE: 18 BRPM | BODY MASS INDEX: 38.62 KG/M2 | SYSTOLIC BLOOD PRESSURE: 150 MMHG

## 2024-08-13 DIAGNOSIS — G47.33 OSA ON CPAP: Primary | Chronic | ICD-10-CM

## 2024-08-13 PROCEDURE — 3079F DIAST BP 80-89 MM HG: CPT | Mod: CPTII,,, | Performed by: HOSPITALIST

## 2024-08-13 PROCEDURE — 3008F BODY MASS INDEX DOCD: CPT | Mod: CPTII,,, | Performed by: HOSPITALIST

## 2024-08-13 PROCEDURE — 1160F RVW MEDS BY RX/DR IN RCRD: CPT | Mod: CPTII,,, | Performed by: HOSPITALIST

## 2024-08-13 PROCEDURE — 1159F MED LIST DOCD IN RCRD: CPT | Mod: CPTII,,, | Performed by: HOSPITALIST

## 2024-08-13 PROCEDURE — 4010F ACE/ARB THERAPY RXD/TAKEN: CPT | Mod: CPTII,,, | Performed by: HOSPITALIST

## 2024-08-13 PROCEDURE — 99999 PR PBB SHADOW E&M-EST. PATIENT-LVL IV: CPT | Mod: PBBFAC,,, | Performed by: HOSPITALIST

## 2024-08-13 PROCEDURE — 99212 OFFICE O/P EST SF 10 MIN: CPT | Mod: S$PBB,,, | Performed by: HOSPITALIST

## 2024-08-13 PROCEDURE — 99214 OFFICE O/P EST MOD 30 MIN: CPT | Mod: PBBFAC | Performed by: HOSPITALIST

## 2024-08-13 PROCEDURE — 3077F SYST BP >= 140 MM HG: CPT | Mod: CPTII,,, | Performed by: HOSPITALIST

## 2024-08-13 NOTE — PROGRESS NOTES
"Subjective:      Patient ID: Marcie Bazan is a 58 y.o. female.    Chief Complaint: Sleep Apnea    HPI 8/13/24:    58 year old female with history of HTN, HLD, BECKA, obesity, hx H. Pylori, RBANNON who presents to Pulmonary clinic for follow up sleep apnea. She was last seen 5/2023 by Elizabeth Lejeune, NP- pt reported benefit with use.     Benefits from use, feels more awake during the day, sleeps deeper. Water in chamber running out in the night, darya her from using cpap at times.  No big medical changes since 5/2023. Has continued to have severe chronic pain, seeing Neurology.     Pertinent Work Up:  - [place] 14  - PSG 11/2020- AHI 8.8/hr    Review of Systems   Respiratory:  Positive for snoring and somnolence.      Objective:     Physical Exam   Constitutional: She is oriented to person, place, and time. She appears well-developed and well-nourished. She is obese.   Cardiovascular: Normal rate and regular rhythm.   Pulmonary/Chest: Normal expansion, effort normal and breath sounds normal.   Neurological: She is alert and oriented to person, place, and time.   Skin: Skin is warm and dry.     Personal Diagnostic Review  As Above      8/12/2024     8:43 AM 7/15/2024    10:11 AM 6/25/2024     9:25 AM 6/19/2024     5:09 PM 6/19/2024     3:39 PM 3/12/2024     9:01 AM 2/29/2024    10:46 AM   Pulmonary Function Tests   SpO2   97 % 97 % 98 % 98 % 97 %   Height 5' 1" (1.549 m) 5' 1" (1.549 m) 5' 1" (1.549 m)  5' 1.85" (1.571 m) 5' 3" (1.6 m)    Weight  100.1 kg (220 lb 10.9 oz) 100.7 kg (222 lb 0.1 oz)  100 kg (220 lb 7.4 oz) 96.7 kg (213 lb 3 oz) 98.2 kg (216 lb 7.9 oz)   BMI (Calculated)  41.7 42  40.5 37.8         Assessment:     No diagnosis found.     Outpatient Encounter Medications as of 8/13/2024   Medication Sig Dispense Refill    amitriptyline (ELAVIL) 25 MG tablet TAKE 1 TO 2 TABLETS(25 TO 50 MG) BY MOUTH EVERY NIGHT AS NEEDED FOR INSOMNIA OR PAIN 60 tablet 5    atorvastatin (LIPITOR) 40 MG tablet Take 1 tablet " (40 mg total) by mouth every evening. 90 tablet 3    cyclobenzaprine (FLEXERIL) 10 MG tablet TAKE 1/2 TO 1 TABLET BY MOUTH THREE TIMES DAILY AS NEEDED FOR MUSCLE SPASMS 90 tablet 5    DULoxetine (CYMBALTA) 30 MG capsule TAKE 1 CAPSULE(30 MG) BY MOUTH EVERY DAY 30 capsule 5    ergocalciferol (ERGOCALCIFEROL) 50,000 unit Cap Take 1 capsule (50,000 Units total) by mouth every 7 days. 12 capsule 3    famotidine (PEPCID) 20 MG tablet TAKE 1 TABLET(20 MG) BY MOUTH TWICE DAILY. 180 tablet 3    fluticasone propionate (FLONASE) 50 mcg/actuation nasal spray 2 sprays (100 mcg total) by Each Nostril route once daily. 16 g 11    folic acid (FOLVITE) 1 MG tablet Take 1 tablet (1 mg total) by mouth once daily. 90 tablet 3    furosemide (LASIX) 40 MG tablet Take 1 tablet (40 mg total) by mouth once daily. 90 tablet 3    levocetirizine (XYZAL) 5 MG tablet Take 1 tablet (5 mg total) by mouth every evening. 30 tablet 3    magnesium oxide (MAG-OX) 400 mg (241.3 mg magnesium) tablet Take 1 tablet (400 mg total) by mouth once daily. 90 tablet 3    NIFEdipine (PROCARDIA-XL) 60 MG (OSM) 24 hr tablet Take 1 tablet (60 mg total) by mouth once daily. 90 tablet 3    pantoprazole (PROTONIX) 40 MG tablet Take 1 tablet (40 mg total) by mouth 2 (two) times daily. 180 tablet 1    potassium chloride (KLOR-CON) 10 MEQ TbSR Take 1 tablet (10 mEq total) by mouth 2 (two) times daily. 180 tablet 3    pregabalin (LYRICA) 225 MG Cap TAKE 1 CAPSULE BY MOUTH TWICE DAILY 60 capsule 5    tacrolimus (PROGRAF) 1 MG Cap Take 2 capsules (2 mg total) by mouth every morning AND 1 capsule (1 mg total) every evening. 90 capsule 11    valsartan (DIOVAN) 80 MG tablet Take 1 tablet (80 mg total) by mouth once daily. Stop losartan 90 tablet 1    [DISCONTINUED] amitriptyline (ELAVIL) 25 MG tablet TAKE 1 TO 2 TABLETS(25 TO 50 MG) BY MOUTH EVERY NIGHT AS NEEDED FOR INSOMNIA OR PAIN 60 tablet 2    [DISCONTINUED] cyclobenzaprine (FLEXERIL) 10 MG tablet TAKE 1/2 TO 1 TABLET BY  MOUTH THREE TIMES DAILY AS NEEDED FOR MUSCLE SPASMS 90 tablet 1    [DISCONTINUED] DULoxetine (CYMBALTA) 30 MG capsule TAKE 1 CAPSULE(30 MG) BY MOUTH EVERY DAY 30 capsule 2    [DISCONTINUED] ergocalciferol (ERGOCALCIFEROL) 50,000 unit Cap Take 1 capsule (50,000 Units total) by mouth every 7 days. 12 capsule 3    [DISCONTINUED] pantoprazole (PROTONIX) 40 MG tablet Take 1 tablet (40 mg total) by mouth 2 (two) times daily. 60 tablet 1    [DISCONTINUED] pregabalin (LYRICA) 225 MG Cap TAKE 1 CAPSULE BY MOUTH TWICE DAILY 60 capsule 2    [DISCONTINUED] valsartan (DIOVAN) 80 MG tablet Take 1 tablet (80 mg total) by mouth once daily. Stop losartan 90 tablet 0     No facility-administered encounter medications on file as of 8/13/2024.     No orders of the defined types were placed in this encounter.    Plan:     Problem List Items Addressed This Visit          Other    BRANNON on CPAP - Primary (Chronic)     - supplies ordered  - decreased humidity from 6 to 3 and instructed pt on how to change if needed  - benefits from use, expect compliance to improve if not having to put as much water in chamber         Relevant Orders    CPAP/BIPAP SUPPLIES     Follow up in 1 year or sooner as needed for annual therapy review.

## 2024-08-13 NOTE — ASSESSMENT & PLAN NOTE
- supplies ordered  - decreased humidity from 6 to 3 and instructed pt on how to change if needed  - benefits from use, expect compliance to improve if not having to put as much water in chamber

## 2024-08-25 ENCOUNTER — HOSPITAL ENCOUNTER (EMERGENCY)
Facility: HOSPITAL | Age: 59
Discharge: HOME | End: 2024-08-25
Attending: EMERGENCY MEDICINE
Payer: MEDICAID

## 2024-08-25 VITALS
SYSTOLIC BLOOD PRESSURE: 173 MMHG | WEIGHT: 220 LBS | RESPIRATION RATE: 18 BRPM | DIASTOLIC BLOOD PRESSURE: 81 MMHG | BODY MASS INDEX: 38.98 KG/M2 | OXYGEN SATURATION: 98 % | HEIGHT: 63 IN | HEART RATE: 90 BPM | TEMPERATURE: 99 F

## 2024-08-25 DIAGNOSIS — M54.31 SCIATICA OF RIGHT SIDE: Primary | ICD-10-CM

## 2024-08-25 DIAGNOSIS — I10 PRIMARY HYPERTENSION: ICD-10-CM

## 2024-08-25 PROCEDURE — 63600175 PHARM REV CODE 636 W HCPCS: Performed by: EMERGENCY MEDICINE

## 2024-08-25 PROCEDURE — 99284 EMERGENCY DEPT VISIT MOD MDM: CPT | Mod: 25

## 2024-08-25 PROCEDURE — 96372 THER/PROPH/DIAG INJ SC/IM: CPT | Performed by: EMERGENCY MEDICINE

## 2024-08-25 RX ORDER — METHYLPREDNISOLONE ACETATE 40 MG/ML
40 INJECTION, SUSPENSION INTRA-ARTICULAR; INTRALESIONAL; INTRAMUSCULAR; SOFT TISSUE
Status: COMPLETED | OUTPATIENT
Start: 2024-08-25 | End: 2024-08-25

## 2024-08-25 RX ADMIN — METHYLPREDNISOLONE ACETATE 40 MG: 40 INJECTION, SUSPENSION INTRA-ARTICULAR; INTRALESIONAL; INTRAMUSCULAR; SOFT TISSUE at 10:08

## 2024-08-26 ENCOUNTER — TELEPHONE (OUTPATIENT)
Dept: INTERNAL MEDICINE | Facility: CLINIC | Age: 59
End: 2024-08-26
Payer: MEDICAID

## 2024-08-26 ENCOUNTER — OFFICE VISIT (OUTPATIENT)
Dept: INTERNAL MEDICINE | Facility: CLINIC | Age: 59
End: 2024-08-26
Payer: MEDICAID

## 2024-08-26 VITALS
DIASTOLIC BLOOD PRESSURE: 79 MMHG | SYSTOLIC BLOOD PRESSURE: 138 MMHG | HEIGHT: 63 IN | WEIGHT: 225.5 LBS | BODY MASS INDEX: 39.95 KG/M2 | OXYGEN SATURATION: 97 % | HEART RATE: 90 BPM | TEMPERATURE: 98 F

## 2024-08-26 DIAGNOSIS — Z23 NEED FOR TETANUS BOOSTER: ICD-10-CM

## 2024-08-26 DIAGNOSIS — M79.18 PIRIFORMIS MUSCLE PAIN: ICD-10-CM

## 2024-08-26 DIAGNOSIS — M54.31 SCIATICA OF RIGHT SIDE: Primary | ICD-10-CM

## 2024-08-26 PROCEDURE — 3078F DIAST BP <80 MM HG: CPT | Mod: CPTII,,, | Performed by: NURSE PRACTITIONER

## 2024-08-26 PROCEDURE — 1159F MED LIST DOCD IN RCRD: CPT | Mod: CPTII,,, | Performed by: NURSE PRACTITIONER

## 2024-08-26 PROCEDURE — 3075F SYST BP GE 130 - 139MM HG: CPT | Mod: CPTII,,, | Performed by: NURSE PRACTITIONER

## 2024-08-26 PROCEDURE — 99214 OFFICE O/P EST MOD 30 MIN: CPT | Mod: S$PBB,,, | Performed by: NURSE PRACTITIONER

## 2024-08-26 PROCEDURE — 1160F RVW MEDS BY RX/DR IN RCRD: CPT | Mod: CPTII,,, | Performed by: NURSE PRACTITIONER

## 2024-08-26 PROCEDURE — 99215 OFFICE O/P EST HI 40 MIN: CPT | Mod: PBBFAC | Performed by: NURSE PRACTITIONER

## 2024-08-26 PROCEDURE — 4010F ACE/ARB THERAPY RXD/TAKEN: CPT | Mod: CPTII,,, | Performed by: NURSE PRACTITIONER

## 2024-08-26 PROCEDURE — 3008F BODY MASS INDEX DOCD: CPT | Mod: CPTII,,, | Performed by: NURSE PRACTITIONER

## 2024-08-26 PROCEDURE — 99999 PR PBB SHADOW E&M-EST. PATIENT-LVL V: CPT | Mod: PBBFAC,,, | Performed by: NURSE PRACTITIONER

## 2024-08-26 RX ORDER — METHYLPREDNISOLONE 4 MG/1
TABLET ORAL
Qty: 21 EACH | Refills: 0 | Status: SHIPPED | OUTPATIENT
Start: 2024-08-26 | End: 2024-09-16

## 2024-08-26 RX ORDER — DICLOFENAC SODIUM 10 MG/G
2 GEL TOPICAL 4 TIMES DAILY
Qty: 150 G | Refills: 1 | Status: SHIPPED | OUTPATIENT
Start: 2024-08-26

## 2024-08-26 RX ORDER — TETANUS TOXOID, REDUCED DIPHTHERIA TOXOID AND ACELLULAR PERTUSSIS VACCINE, ADSORBED 5; 2.5; 8; 8; 2.5 [IU]/.5ML; [IU]/.5ML; UG/.5ML; UG/.5ML; UG/.5ML
0.5 SUSPENSION INTRAMUSCULAR ONCE
Qty: 0.5 ML | Refills: 0 | Status: SHIPPED | OUTPATIENT
Start: 2024-08-26 | End: 2024-08-26

## 2024-08-26 NOTE — PROGRESS NOTES
Subjective:      Patient ID: Marcie Bazan is a 58 y.o. female.    Chief Complaint: Follow-up (Pt present today for an [facility] on 8/26/24  f/u with c/o pain on rt side of body with tingling in the toes,dx w/ possible sciatica )    History of Present Illness    CHIEF COMPLAINT:  Marcie presents today for chronic pain in the right side of her body.    CHRONIC PAIN:  She reports chronic pain for 5-7 years, worsening over the past year. The pain affects her entire right side, with left side pain occurring only when touched. She describes it as constant aching, sometimes throbbing, with a burning sensation in her back. Pain severity is rated 10/10. Aggravating factors include sitting, standing, walking, and kitchen activities. Sometimes sleeping or resting helps, but often nothing provides relief. The pain significantly impacts her daily life, preventing household chores, cooking, caring for her grandchild, and participating in family activities. She reports frequent ER visits (approximately every two weeks) due to unbearable pain. A recent ER visit provided a shot for pain relief, which was supposed to last three days but wore off within one day.    PREVIOUS TREATMENTS:  She has been in pain management for 5-7 years but reports maxing out on all prescribed medications. Injections, which used to provide relief, are no longer effective. Physical therapy was attempted for over two years but reportedly increased her pain. She denies any recent injuries or MVAs.    MEDICAL HISTORY:  She had a kidney transplant 7 years ago, which she describes as 'a blessing'. She has a possible diagnosis of diabetes, reporting an elevated last glucose A1C. She is aware of medication restrictions for transplant patients, specifically avoiding NSAIDs.    MEDICATIONS:  She takes a diuretic, which causes urgent urination when taken. Without the medication, she rarely uses the bathroom.    DIAGNOSTIC TESTS:  She reports having undergone  neurologist consultations and an orthopedic evaluation by Dr. Filemon Whatley at [facility].    SOCIAL HISTORY:  She has family support involved in her care, including her daughter and six-year-old granddaughter.    UPCOMING EVENTS:  She has a vacation to [place] planned for November 25-26, but expresses concern about pain affecting her ability to participate.    ROS:  General: -fever, -chills, -fatigue, -weight gain, -weight loss  Eyes: -vision changes, -redness, -discharge  ENT: -ear pain, -nasal congestion, -sore throat  Cardiovascular: -chest pain, -palpitations, -lower extremity edema  Respiratory: -cough, -shortness of breath  Gastrointestinal: -abdominal pain, -nausea, -vomiting, -diarrhea, -constipation, -blood in stool  Genitourinary: -dysuria, -hematuria, +frequency  Musculoskeletal: +joint pain, -muscle pain  Skin: -rash, -lesion  Neurological: -headache, -dizziness, -numbness, -tingling  Psychiatric: -anxiety, -depression, -sleep difficulty          Patient Active Problem List   Diagnosis    Allergic rhinitis    Anemia of chronic disease    Erosive esophagitis    Essential hypertension    History of abnormal cervical Pap smear    History of Helicobacter pylori infection    Lipoma    Multinodular thyroid    Diuretic-induced hypokalemia    Hypomagnesemia    Thrombocytopenia    Liver replaced by transplant    Immunosuppression    At risk for opportunistic infections    Long-term use of immunosuppressant medication    Vitamin D deficiency    Mixed hyperlipidemia    Adhesive capsulitis of both shoulders    Piriformis syndrome of both sides    Spondylosis of lumbar region without myelopathy or radiculopathy    Class 2 severe obesity due to excess calories with serious comorbidity and body mass index (BMI) of 36.0 to 36.9 in adult    Sacroiliac joint dysfunction    Acute stress reaction    BRANNON on CPAP    Gastroesophageal reflux disease with esophagitis without hemorrhage    Piriformis muscle  pain    Long term current use of loop diuretic    Iron deficiency anemia    Iron deficiency anemia due to chronic blood loss    Decreased strength, endurance, and mobility    Knee pain, bilateral    Choking    Decreased hearing of both ears    Occipital neuralgia of left side    COVID    Chronic constipation    Chronic nausea    Prediabetes         Current Outpatient Medications:     amitriptyline (ELAVIL) 25 MG tablet, TAKE 1 TO 2 TABLETS(25 TO 50 MG) BY MOUTH EVERY NIGHT AS NEEDED FOR INSOMNIA OR PAIN, Disp: 60 tablet, Rfl: 5    atorvastatin (LIPITOR) 40 MG tablet, Take 1 tablet (40 mg total) by mouth every evening., Disp: 90 tablet, Rfl: 3    cyclobenzaprine (FLEXERIL) 10 MG tablet, TAKE 1/2 TO 1 TABLET BY MOUTH THREE TIMES DAILY AS NEEDED FOR MUSCLE SPASMS, Disp: 90 tablet, Rfl: 5    DULoxetine (CYMBALTA) 30 MG capsule, TAKE 1 CAPSULE(30 MG) BY MOUTH EVERY DAY, Disp: 30 capsule, Rfl: 5    ergocalciferol (ERGOCALCIFEROL) 50,000 unit Cap, Take 1 capsule (50,000 Units total) by mouth every 7 days., Disp: 12 capsule, Rfl: 3    famotidine (PEPCID) 20 MG tablet, TAKE 1 TABLET(20 MG) BY MOUTH TWICE DAILY., Disp: 180 tablet, Rfl: 3    fluticasone propionate (FLONASE) 50 mcg/actuation nasal spray, 2 sprays (100 mcg total) by Each Nostril route once daily., Disp: 16 g, Rfl: 11    folic acid (FOLVITE) 1 MG tablet, Take 1 tablet (1 mg total) by mouth once daily., Disp: 90 tablet, Rfl: 3    furosemide (LASIX) 40 MG tablet, Take 1 tablet (40 mg total) by mouth once daily., Disp: 90 tablet, Rfl: 3    levocetirizine (XYZAL) 5 MG tablet, Take 1 tablet (5 mg total) by mouth every evening., Disp: 30 tablet, Rfl: 3    magnesium oxide (MAG-OX) 400 mg (241.3 mg magnesium) tablet, Take 1 tablet (400 mg total) by mouth once daily., Disp: 90 tablet, Rfl: 3    NIFEdipine (PROCARDIA-XL) 60 MG (OSM) 24 hr tablet, Take 1 tablet (60 mg total) by mouth once daily., Disp: 90 tablet, Rfl: 3    pantoprazole (PROTONIX) 40 MG tablet, Take 1 tablet  "(40 mg total) by mouth 2 (two) times daily., Disp: 180 tablet, Rfl: 1    potassium chloride (KLOR-CON) 10 MEQ TbSR, Take 1 tablet (10 mEq total) by mouth 2 (two) times daily., Disp: 180 tablet, Rfl: 3    pregabalin (LYRICA) 225 MG Cap, TAKE 1 CAPSULE BY MOUTH TWICE DAILY, Disp: 60 capsule, Rfl: 5    tacrolimus (PROGRAF) 1 MG Cap, Take 2 capsules (2 mg total) by mouth every morning AND 1 capsule (1 mg total) every evening., Disp: 90 capsule, Rfl: 11    valsartan (DIOVAN) 80 MG tablet, Take 1 tablet (80 mg total) by mouth once daily. Stop losartan, Disp: 90 tablet, Rfl: 1    diclofenac sodium (VOLTAREN) 1 % Gel, Apply 2 g topically 4 (four) times daily., Disp: 150 g, Rfl: 1    methylPREDNISolone (MEDROL DOSEPACK) 4 mg tablet, use as directed, Disp: 21 each, Rfl: 0      Objective:   /79 (BP Location: Left arm, Patient Position: Sitting, BP Method: Medium (Manual))   Pulse 90   Temp 98.1 °F (36.7 °C) (Tympanic)   Ht 5' 3" (1.6 m)   Wt 102.3 kg (225 lb 8.5 oz)   LMP 01/01/1985 (Approximate) Comment: 1985  SpO2 97%   BMI 39.95 kg/m²     Physical Exam    General: In no acute distress.  Head: Normocephalic. Non traumatic.  Eyes: PERRLA. EOMs full. Conjunctivae clear. Fundi grossly normal.  Ears: EACs clear. TMs normal.  Nose: Mucosa pink. Mucosa moist. No obstruction.  Throat: Clear. No exudates. No lesions.  Neck: Supple. No masses. No thyromegaly. No bruits.  Chest: Lungs clear. No rales. No rhonchi. No wheezes.  Heart: RRR. No murmurs. No rubs. No gallops.  Abdomen: Soft. No tenderness. No masses. BS normal.  : Normal external genitalia. No lesions. No discharge. No hernias  noted.  Back: Normal curvature. No scoliosis. No tenderness.  Extremities: Warm. Well perfused. No upper extremity edema. No lower extremity edema. FROM. No deformities. No joint erythema.  Neuro: No focal deficits appreciated. Good muscle tone. Normal response to visual stimuli. Normal response to auditory stimuli.  Skin: Normal. No " rashes. No lesions noted.  Musculoskeletal: TENDERNESS IN PIRIFORMIS MUSCLE. PAIN WITH STRAIGHT LEG RAISE ON RIGHT SIDE. Normal straight leg raise on left side.          Assessment:     1. Sciatica of right side    2. Piriformis muscle pain    3. Need for tetanus booster      Plan:   Assessment & Plan    Assessed chronic pain, likely involving sciatica and IT band  Evaluated physical exam findings, including limited range of motion and pain with straight leg raise  Determined need for anti-inflammatory treatment due to severity of pain (10/10)  Considered patient's transplant history when making medication decisions  KNEE PAIN:  - Explained the connection between knee pain and compensating for other pain.  - Discussed the importance of stretching the IT band.  - Marcie to use heating pad for pain relief.  - Started diclofenac cream for topical application.  - Referred back to physical therapy.  - Referred to Dr. Filemon Whatley at [facility].  STEROID TREATMENT:  - Started steroid Dosepak.  - Marcie to drink plenty of water to flush out steroids as it can increase your blood glucose.  TETANUS VACCINATION:  - Tetanus vaccine sent to pharmacy  BREAST CANCER SCREENING:  - Follow up to schedule mammogram due in November.          No follow-ups on file.

## 2024-08-26 NOTE — ED PROVIDER NOTES
SCRIBE #1 NOTE: I, Francesca Fink, am scribing for, and in the presence of, Gurdeep Koehler MD. I have scribed the entire note.       History     Chief Complaint   Patient presents with    Back Pain     Right-Sided Body Pain. HX of Sciatic Nerve Damage      Review of patient's allergies indicates:  No Known Allergies      History of Present Illness     HPI    8/25/2024, 10:03 PM  History obtained from the patient      History of Present Illness: Marcie Bazan is a 58 y.o. female patient with a PMHx of HTN, HLD, and alcoholic Hepatitis s/p liver transplant (2017) who presents to the Emergency Department for evaluation of constant, moderate lower right back pain that radiates down the RLE which onset a few days PTA, worse with walking and ROM of the RLE. The patient reports a Hx of sciatic nerve damage and is followed by Neurology and Pain Management. She reports no relief with Flexeril, Cymbalta, or Lyrica. She is also taking Prograf. Patient denies any fever, CP, SOB, and all other sxs at this time. No further complaints or concerns at this time.       Arrival mode: Personal vehicle    PCP: ETHAN Munoz MD        Past Medical History:  Past Medical History:   Diagnosis Date    Alcohol abuse     Alcoholic hepatitis     Anemia of chronic disease     Arthritis     generialized    Cancer 12/26/2017    liver    Coagulopathy     Dyspnea on exertion 3/26/2020    Encounter for blood transfusion     End stage liver disease     History of alcohol abuse     Hyperlipidemia     Hypersomnia 9/11/2020    Hypertension     Hyponatremia     Liver cirrhosis     Liver transplant candidate 12/26/2017    Obesity (BMI 30-39.9) 1/5/2016    Prediabetes 4/22/2023    Sleep apnea        Past Surgical History:  Past Surgical History:   Procedure Laterality Date    BILATERAL SALPINGO-OOPHORECTOMY (BSO) Bilateral 1986    with hysterectomy done for abnormal cells    BREAST BIOPSY Left     benign    BREAST LUMPECTOMY      patient is unsure of  date or laterality    CATARACT EXTRACTION W/  INTRAOCULAR LENS IMPLANT Right 9/20/2023    Procedure: EXTRACTION, CATARACT, WITH IOL INSERTION;  Surgeon: Kim Mullen MD;  Location: [facility] OR;  Service: Ophthalmology;  Laterality: Right;  Per Elidia Viveros change patient from left eye to right on 9/11 @ 2:38p    CATARACT EXTRACTION W/  INTRAOCULAR LENS IMPLANT Left 10/11/2023    Procedure: EXTRACTION, CATARACT, WITH IOL INSERTION;  Surgeon: Kim Mullen MD;  Location: [facility] OR;  Service: Ophthalmology;  Laterality: Left;  Per Idalia Rosa change the patient to Left and to 10/11 on 9/8 @8:49Am    CHOLECYSTECTOMY      COLONOSCOPY N/A 12/01/2017    Procedure: COLONOSCOPY;  Surgeon: Filemon Fuentes MD;  Location: [facility] (2ND FLR);  Service: Endoscopy;  Laterality: N/A;    COLONOSCOPY N/A 12/05/2017    Procedure: COLONOSCOPY;  Surgeon: José Chavira MD;  Location: [facility] ([address] [place] [address]);  Service: Endoscopy;  Laterality: N/A;    COLONOSCOPY N/A 12/12/2022    Procedure: COLONOSCOPY;  Surgeon: Gogo Brown MD;  Location: [facility] ENDO;  Service: Endoscopy;  Laterality: N/A;    EPIDURAL STEROID INJECTION N/A 2/28/2023    Procedure: L4/5 IL WILBERTO (with right paramedian approach);  Surgeon: Ras Caballero MD;  Location: [facility] PAIN MGT;  Service: Pain Management;  Laterality: N/A;    EPIDURAL STEROID INJECTION INTO CERVICAL SPINE N/A 01/04/2022    Procedure: C5/6 IL WILBERTO;  Surgeon: Ras Caballero MD;  Location: [facility] PAIN MGT;  Service: Pain Management;  Laterality: N/A;    ESOPHAGOGASTRODUODENOSCOPY N/A 9/14/2023    Procedure: EGD (ESOPHAGOGASTRODUODENOSCOPY);  Surgeon: Angie Zimmerman MD;  Location: [facility] ENDO;  Service: Endoscopy;  Laterality: N/A;    EXAMINATION UNDER ANESTHESIA N/A 02/18/2020    Procedure: EXAM UNDER ANESTHESIA;  Surgeon: Adlen Horowitz MD;  Location: [facility] OR;  Service: General;  Laterality: N/A;    EXCISIONAL HEMORRHOIDECTOMY N/A 02/18/2020    Procedure: HEMORRHOIDECTOMY;  Surgeon:  Alden Horowitz MD;  Location: [facility] OR;  Service: General;  Laterality: N/A;    HYSTERECTOMY  1986    due to abnormal paps - ovaries were removed    INJECTION OF ANESTHETIC AGENT AROUND PUDENDAL NERVE N/A 02/18/2020    Procedure: BLOCK, NERVE, PUDENDAL;  Surgeon: Alden Horowitz MD;  Location: [facility] OR;  Service: General;  Laterality: N/A;    INJECTION OF ANESTHETIC AGENT INTO SACROILIAC JOINT Right 06/14/2019    Procedure: right Sacroiliac Joint Injection;  Surgeon: David Desai MD;  Location: [facility] PAIN MGT;  Service: Pain Management;  Laterality: Right;    INJECTION OF ANESTHETIC AGENT INTO SACROILIAC JOINT Bilateral 02/07/2020    Procedure: Bilateral GT bursa + Bilateral Sacroiliac Joint Injection;  Surgeon: David Desai MD;  Location: [facility] PAIN MGT;  Service: Pain Management;  Laterality: Bilateral;    INJECTION OF ANESTHETIC AGENT INTO SACROILIAC JOINT Bilateral 07/07/2020    Procedure: Bilateral GT bursa +SIJ injection;  Surgeon: David Desai MD;  Location: [facility] PAIN MGT;  Service: Pain Management;  Laterality: Bilateral;    INJECTION OF ANESTHETIC AGENT INTO SACROILIAC JOINT Right 10/21/2020    Procedure: Right piriformis + right SIJ + right GT bursa injection;  Surgeon: David Desai MD;  Location: [facility] PAIN MGT;  Service: Pain Management;  Laterality: Right;    INJECTION OF ANESTHETIC AGENT INTO SACROILIAC JOINT Bilateral 04/15/2021    Procedure: Bilateral SIJ + Bilateral Piriformis + Bilateral GT Bursa Injection;  Surgeon: Flaco Frazier MD;  Location: [facility] PAIN MGT;  Service: Pain Management;  Laterality: Bilateral;    INJECTION OF ANESTHETIC AGENT INTO SACROILIAC JOINT Bilateral 04/21/2022    Procedure: Bilateral SIJ + Bilateral Piriformis + Bilateral GT Bursa Injection;  Surgeon: Ras Caballero MD;  Location: [facility] PAIN MGT;  Service: Pain Management;  Laterality: Bilateral;    INJECTION OF ANESTHETIC AGENT INTO SACROILIAC JOINT Right 4/18/2023    Procedure: Right SIJ + Right  piriformis +/- Right GT bursa injection;  Surgeon: Ras Caballero MD;  Location: [facility] PAIN MGT;  Service: Pain Management;  Laterality: Right;    INJECTION OF JOINT Bilateral 02/07/2020    Procedure: Bilateral GT bursa + Bilateral Sacroiliac Joint Injection;  Surgeon: David Desai MD;  Location: [facility] PAIN MGT;  Service: Pain Management;  Laterality: Bilateral;    INJECTION OF JOINT Bilateral 07/07/2020    Procedure: Bilateral GT bursa +SIJ injection;  Surgeon: David Desai MD;  Location: [facility] PAIN MGT;  Service: Pain Management;  Laterality: Bilateral;    INJECTION OF JOINT Right 10/21/2020    Procedure: Right piriformis + right SIJ + right GT bursa injection;  Surgeon: David Desai MD;  Location: [facility] PAIN MGT;  Service: Pain Management;  Laterality: Right;    INJECTION OF JOINT Bilateral 02/08/2021    Procedure: Bilateral GT Bursa Injection;  Surgeon: Anna Kaminski MD;  Location: [facility] PAIN MGT;  Service: Pain Management;  Laterality: Bilateral;    INJECTION OF JOINT Bilateral 04/15/2021    Procedure: Bilateral SIJ + Bilateral Piriformis + Bilateral GT Bursa Injection;  Surgeon: Flaco Frazier MD;  Location: [facility] PAIN MGT;  Service: Pain Management;  Laterality: Bilateral;    INJECTION OF JOINT Bilateral 04/21/2022    Procedure: Bilateral SIJ + Bilateral Piriformis + Bilateral GT Bursa Injection;  Surgeon: Ras Caballero MD;  Location: [facility] PAIN MGT;  Service: Pain Management;  Laterality: Bilateral;    INJECTION OF JOINT Right 10/11/2022    Procedure: right SIJ + right piriformis + right GT bursa injection;  Surgeon: Ras Caballero MD;  Location: [facility] PAIN MGT;  Service: Pain Management;  Laterality: Right;    INJECTION OF PIRIFORMIS MUSCLE Right 06/14/2019    Procedure: Right piriformis injection;  Surgeon: David Desai MD;  Location: [facility] PAIN MGT;  Service: Pain Management;  Laterality: Right;    INJECTION OF PIRIFORMIS MUSCLE Right 10/21/2020    Procedure: Right piriformis + right  SIJ + right GT bursa injection;  Surgeon: David Desai MD;  Location: [facility] PAIN MGT;  Service: Pain Management;  Laterality: Right;    INJECTION OF PIRIFORMIS MUSCLE Bilateral 04/15/2021    Procedure: Bilateral SIJ + Bilateral Piriformis + Bilateral GT Bursa Injection;  Surgeon: Flaco Frazier MD;  Location: [facility] PAIN MGT;  Service: Pain Management;  Laterality: Bilateral;    INJECTION OF PIRIFORMIS MUSCLE Bilateral 04/21/2022    Procedure: Bilateral SIJ + Bilateral Piriformis + Bilateral GT Bursa Injection;  Surgeon: Ras Caballero MD;  Location: [facility] PAIN MGT;  Service: Pain Management;  Laterality: Bilateral;    LIVER TRANSPLANT  12/2017    SELECTIVE INJECTION OF ANESTHETIC AGENT AROUND LUMBAR SPINAL NERVE ROOT BY TRANSFORAMINAL APPROACH Bilateral 10/28/2021    Procedure: BLOCK, SPINAL NERVE ROOT, LUMBAR, SELECTIVE, TRANSFORAMINAL APPROACH bilateral L4-5 and Right Knee injection with RN IV sedation;  Surgeon: Flaco Frazier MD;  Location: [facility] PAIN MGT;  Service: Pain Management;  Laterality: Bilateral;    SELECTIVE INJECTION OF ANESTHETIC AGENT AROUND LUMBAR SPINAL NERVE ROOT BY TRANSFORAMINAL APPROACH Bilateral 08/18/2022    Procedure: Bilateral L4/5 TF WILBERTO;  Surgeon: Ras Caballero MD;  Location: [facility] PAIN MGT;  Service: Pain Management;  Laterality: Bilateral;    TRANSFORAMINAL EPIDURAL INJECTION OF STEROID Right 8/15/2023    Procedure: right L5/S1 + S1 TF WILBERTO;  Surgeon: Ras Caballero MD;  Location: [facility] PAIN MGT;  Service: Pain Management;  Laterality: Right;         Family History:  Family History   Problem Relation Name Age of Onset    Hypertension Mother      Alzheimer's disease Mother      Hypertension Father      Diabetes Father      Diabetes Sister      Ovarian cancer Paternal Aunt      Depression Maternal Grandfather         Social History:  Social History     Tobacco Use    Smoking status: Never    Smokeless tobacco: Never   Substance and Sexual Activity    Alcohol use: No     Comment:  Currently admitted to inpatient rehab 7/2017    Drug use: No    Sexual activity: Not Currently        Review of Systems     Review of Systems   Constitutional:  Negative for fever.   HENT:  Negative for sore throat.    Respiratory:  Negative for shortness of breath.    Cardiovascular:  Negative for chest pain.   Gastrointestinal:  Negative for nausea.   Genitourinary:  Negative for dysuria.   Musculoskeletal:  Positive for back pain (lower right) and myalgias (RLE).   Skin:  Negative for rash.   Neurological:  Negative for weakness.   Hematological:  Does not bruise/bleed easily.   All other systems reviewed and are negative.     Physical Exam     Initial Vitals [08/25/24 2140]   BP Pulse Resp Temp SpO2   (!) 173/81 90 18 98.5 °F (36.9 °C) 98 %      MAP       --          Physical Exam  Nursing Notes and Vital Signs Reviewed.  Constitutional: Patient is in moderate distress. Well-developed and well-nourished.  Head: Atraumatic. Normocephalic.  Eyes: PERRL. EOM intact. Conjunctivae are not pale. No scleral icterus.  ENT: Mucous membranes are moist. Oropharynx is clear and symmetric.    Neck: Supple. Full ROM. No lymphadenopathy.  Cardiovascular: Regular rate. Regular rhythm. No murmurs, rubs, or gallops. Distal pulses are 2+ and symmetric.  Pulmonary/Chest: No respiratory distress. Clear to auscultation bilaterally. No wheezing or rales.  Abdominal: Soft and non-distended.  There is no tenderness.  No rebound, guarding, or rigidity. Good bowel sounds.  Genitourinary: No CVA tenderness  Musculoskeletal: Moves all extremities. No obvious deformities. No edema. No calf tenderness.  Back: No midline bony tenderness, deformities, or step-offs of the T-spine or L-spine. Skin appears normal without abrasions or bruising. No erythema, induration, or fluctuance.   Skin: Warm and dry.  Neurological: Awake and alert. Appropriate for age. Negative straight leg raise bilaterally. No strength deficit; equal and 5/5 in bilateral  "upper and lower extremities. No light touch sensory deficit. DTRs 2+ and equal. Normal gait. No acute focal neurological deficits noted.  Psychiatric: Normal affect. Good eye contact. Appropriate in content.     ED Course   Procedures  ED Vital Signs:  Vitals:    08/25/24 2140   BP: (!) 173/81   Pulse: 90   Resp: 18   Temp: 98.5 °F (36.9 °C)   TempSrc: Oral   SpO2: 98%   Weight: 99.8 kg (220 lb)   Height: 5' 3" (1.6 m)       Abnormal Lab Results:  Labs Reviewed - No data to display     All Lab Results:  None    Imaging Results:  Imaging Results    None                   The Emergency Provider reviewed the vital signs and test results, which are outlined above.     ED Discussion     10:44 PM: Reassessed pt at this time. Discussed with pt all pertinent ED information and results. Discussed pt dx and plan of tx. Gave pt all f/u and return to the ED instructions. All questions and concerns were addressed at this time. Pt expresses understanding of information and instructions, and is comfortable with plan to discharge. Pt is stable for discharge.    I discussed with patient and/or family/caretaker that evaluation in the ED does not suggest any emergent or life threatening medical conditions requiring immediate intervention beyond what was provided in the ED, and I believe patient is safe for discharge.  Regardless, an unremarkable evaluation in the ED does not preclude the development or presence of a serious of life threatening condition. As such, patient was instructed to return immediately for any worsening or change in current symptoms.         Medical Decision Making  Risk  Prescription drug management.                ED Medication(s):  Medications   methylPREDNISolone acetate injection 40 mg (40 mg Intramuscular Given 8/25/24 2214)       Discharge Medication List as of 8/25/2024 10:43 PM           Follow-up Information       ETHAN Munoz MD In 2 days.    Specialty: Family Medicine  Contact " information:  [id number]  [place] [address]  [phone]               [facility] - Emergency Dept..    Specialty: Emergency Medicine  Why: As needed, If symptoms worsen  Contact information:  [address] [facility] [address] [phone]                               Scribe Attestation:   Scribe #1: I performed the above scribed service and the documentation accurately describes the services I performed. I attest to the accuracy of the note.     Attending:   Physician Attestation Statement for Scribe #1: I, Gurdeep Koehler MD, personally performed the services described in this documentation, as scribed by Francesca Fink, in my presence, and it is both accurate and complete.           Clinical Impression       ICD-10-CM ICD-9-CM   1. Sciatica of right side  M54.31 724.3   2. Primary hypertension  I10 401.9       Disposition:   Disposition: Discharged  Condition: Stable         Gurdeep Koehler MD  08/26/24 0502

## 2024-08-26 NOTE — TELEPHONE ENCOUNTER
----- Message from Amanda Orlj sent at 8/26/2024 12:17 PM CDT -----  Contact: Pt  [phone]  Would like to receive medical advice.    Symptoms (please be specific): pain in right side of body and back     How long has the patient had these symptoms:      Would they like a call back or a response via mylearnadfriendner:  call back     Additional information:  Pt went to the ER yesterday and they recommended that she schedule an appt with  her PCP for stronger pain medicine.  She sees a pain management doctor but the pain meds that she prescribed is not helping.  There were no available appts for me to schedule.

## 2024-09-04 ENCOUNTER — TELEPHONE (OUTPATIENT)
Dept: TRANSPLANT | Facility: CLINIC | Age: 59
End: 2024-09-04
Payer: MEDICAID

## 2024-09-04 ENCOUNTER — OFFICE VISIT (OUTPATIENT)
Dept: INTERNAL MEDICINE | Facility: CLINIC | Age: 59
End: 2024-09-04
Payer: MEDICAID

## 2024-09-04 ENCOUNTER — PATIENT MESSAGE (OUTPATIENT)
Dept: INTERNAL MEDICINE | Facility: CLINIC | Age: 59
End: 2024-09-04

## 2024-09-04 ENCOUNTER — TELEPHONE (OUTPATIENT)
Dept: INTERNAL MEDICINE | Facility: CLINIC | Age: 59
End: 2024-09-04
Payer: MEDICAID

## 2024-09-04 DIAGNOSIS — I10 ESSENTIAL HYPERTENSION: Chronic | ICD-10-CM

## 2024-09-04 DIAGNOSIS — G89.4 CHRONIC PAIN SYNDROME: Chronic | ICD-10-CM

## 2024-09-04 DIAGNOSIS — M47.27 LUMBOSACRAL RADICULOPATHY DUE TO DEGENERATIVE JOINT DISEASE OF SPINE: Primary | Chronic | ICD-10-CM

## 2024-09-04 DIAGNOSIS — M54.41 CHRONIC BILATERAL LOW BACK PAIN WITH RIGHT-SIDED SCIATICA: Chronic | ICD-10-CM

## 2024-09-04 DIAGNOSIS — G89.29 CHRONIC BILATERAL LOW BACK PAIN WITH RIGHT-SIDED SCIATICA: Chronic | ICD-10-CM

## 2024-09-04 PROCEDURE — 1159F MED LIST DOCD IN RCRD: CPT | Mod: CPTII,95,, | Performed by: FAMILY MEDICINE

## 2024-09-04 PROCEDURE — 1160F RVW MEDS BY RX/DR IN RCRD: CPT | Mod: CPTII,95,, | Performed by: FAMILY MEDICINE

## 2024-09-04 PROCEDURE — G2211 COMPLEX E/M VISIT ADD ON: HCPCS | Mod: 95,,, | Performed by: FAMILY MEDICINE

## 2024-09-04 PROCEDURE — 4010F ACE/ARB THERAPY RXD/TAKEN: CPT | Mod: CPTII,95,, | Performed by: FAMILY MEDICINE

## 2024-09-04 PROCEDURE — 99214 OFFICE O/P EST MOD 30 MIN: CPT | Mod: 95,,, | Performed by: FAMILY MEDICINE

## 2024-09-04 RX ORDER — DICLOFENAC SODIUM 10 MG/G
GEL TOPICAL
Qty: 200 G | Refills: 5 | Status: SHIPPED | OUTPATIENT
Start: 2024-09-04

## 2024-09-04 RX ORDER — DULOXETIN HYDROCHLORIDE 60 MG/1
60 CAPSULE, DELAYED RELEASE ORAL DAILY
Qty: 30 CAPSULE | Refills: 5 | Status: SHIPPED | OUTPATIENT
Start: 2024-09-04

## 2024-09-04 NOTE — TELEPHONE ENCOUNTER
----- Message from Adrianna Mota sent at 9/4/2024  7:14 AM CDT -----  Contact: Mobile  [phone]  Patient would like for you to give her a call in regards to her saying that someone told her that her appointment on today was going to be an Audio visit, patient said that she cannot make it in to her appointment on today and she would like for you to give her a call.

## 2024-09-04 NOTE — TELEPHONE ENCOUNTER
----- Message from Christi Shah sent at 9/4/2024  6:08 AM CDT -----  Regarding: r/s labs  Contact: [phone]  PT unable to make lab appt today. Please r/s for same location tomorrow.    Patient can be contacted @# [phone]

## 2024-09-04 NOTE — ASSESSMENT & PLAN NOTE
BP Readings from Last 6 Encounters:   08/26/24 138/79   08/25/24 (!) 173/81   08/13/24 (!) 150/86   07/15/24 136/83   06/25/24 124/82   06/19/24 133/84      Last 5 Patient Entered Readings                Current 30 Day Average: 130/74  Recent Readings 9/3/2024 9/2/2024 8/31/2024 8/30/2024 8/28/2024   SBP (mmHg) 105 134 118 129 157   DBP (mmHg) 58 78 62 74 76   Pulse 81 79 76 79 79                 Lab Results   Component Value Date    EGFRNORACEVR >60.0 06/25/2024    CREATININE 0.9 06/25/2024    BUN 13 06/25/2024    K 3.7 06/25/2024     06/25/2024     06/25/2024     Results for orders placed or performed in visit on 06/19/24   IN OFFICE EKG 12-LEAD (to [place])    Collection Time: 06/19/24  4:11 PM   Result Value Ref Range    QRS Duration 72 ms    [facility] QTC Calculation 447 ms    Narrative    Test Reason : R07.9,    Vent. Rate : 088 BPM     Atrial Rate : 088 BPM     P-R Int : 132 ms          QRS Dur : 072 ms      QT Int : 370 ms       P-R-T Axes : 063 000 -20 degrees     QTc Int : 447 ms    Sinus rhythm with Premature atrial complexes  Nonspecific T wave abnormality  Abnormal ECG  When compared with ECG of 06-OCT-2023 08:24,  Premature atrial complexes are now Present  Confirmed by JULIANA KNOWLES MD ([id number]) on 6/23/2024 4:28:47 PM    Referred By:             Confirmed By:JULIANA KNOWLES MD

## 2024-09-04 NOTE — PROGRESS NOTES
TELEMEDICINE VIRTUAL VIDEO VISIT  9/4/24 12:20 PM CDT    Visit Type: Audiovisual    Patient's Location: Marcie represents that they are located within the state of [place].    History of Present Illness    Marcie presents today for follow-up on low back pain with sciatica.    She reports worsening bilateral low back pain, worse on the right side, radiating from her shoulder down to her foot. The pain is described as deep inside her hip, making it difficult to sleep on that side. Right-sided sciatica is present with pain along the side and front of the leg, extending down to the knee and top of the foot. Some involvement of the back of the leg is noted, but pain is not experienced there unless touched. She denies fever or loss of bowel or bladder control. Her condition has deteriorated to the point where she struggles to move from one side of the house to the other, put on clothes or shoes on the right side, or bend down due to pain.    MRI of the lumbar spine revealed degenerative changes in the lower lumbar region, specifically at L3-L4 and L4-L5 levels, with mild neural foramen narrowing and mild stenosis. The thoracic spine MRI showed degenerative changes. The cervical spine MRI demonstrated degenerative changes with mild narrowing and a pinched nerve.    Previous treatments included injections, which were helpful for 2-3 years but are no longer effective. Physical therapy was attempted over a year ago but caused more pain than relief. She recently completed a Medrol Dosepak without significant improvement. A topical cream/gel (white, odorless) prescribed by JOSE RAMON Rubio has been effective.    She is currently taking pregabalin 225 mg twice daily, duloxetine 30 mg nightly, amitriptyline 1-2 tablets at bedtime as needed for pain (currently taking one tablet), cyclobenzaprine (recently restarted), and a topical cream/gel. She denies any side effects from duloxetine.    She experiences leg discomfort at night,  "describing it as her leg "going crazy" when still, which interferes with her sleep. She has to wait for it to stop before falling asleep.    She has an appointment with a neurosurgeon in about 2 weeks at [facility]. Additional tests have been ordered to be completed prior to this appointment.       Review of Systems   Constitutional:  Negative for diaphoresis and fever.   Cardiovascular:  Negative for chest pain.   Gastrointestinal:  Negative for abdominal pain, change in bowel habit and fecal incontinence.   Genitourinary:  Negative for bladder incontinence, difficulty urinating, dysuria, hematuria and pelvic pain.   Musculoskeletal:  Positive for back pain and leg pain.   Neurological:  Negative for numbness and headaches.        No saddle anesthesia or loss of lower extremity strength or sensation.       Assessment & Plan     Assessed chronic low back pain with right-sided sciatica, likely due to degenerative spine disease based on previous imaging   Noted pain is worsening despite previous interventions, including injections and current medication regimen   Considered increasing duloxetine dosage from 30mg to 60mg daily as conservative measure   Recommend increasing amitriptyline dosage from 1-2 tablets at bedtime as needed for pain   Acknowledged patient recently restarted cyclobenzaprine and advised monitoring for sedation   Deferred additional interventions pending upcoming neurosurgery evaluation  M54.16 RADICULOPATHY, LUMBAR REGION:   Increased duloxetine from 30mg to 60mg daily.   Increased amitriptyline from 1-2 tablets at bedtime as needed for pain.   Continued cyclobenzaprine as a muscle relaxant; advised the patient to monitor for sedation.   Scheduled follow-up with neurosurgery in approximately 2 weeks.   Scheduled follow-up visit with Interventional Pain Management Specialist at [facility].   Instructed the patient to have the neurosurgeon contact the office " directly if there is anything they can do to assist in treatment.   Noted worsening pain despite previous treatments, with pain radiating from the shoulder down the right side to the foot, affecting the back, buttock, hip, and leg.   Observed difficulty with movement and daily activities due to pain, particularly on the right side.   Reviewed MRI of lumbar spine, which showed degenerative changes in the lower lumbar region (L3, L4, and L4-L5) with mild narrowing of neural foramen and mild stenosis, potentially causing sciatica-type symptoms.   Noted previous exam by Elva showed pain on straight leg raise test on the right and tenderness in the right-sided piriformis muscle.   Assessed the condition as degenerative spine disease with no red flags for serious conditions like tumor or spine infection.   Reviewed previous interventional pain management specialist notes and neurosurgery appointment.   Continued current pain medication regimen: pregabalin 225mg twice daily, duloxetine (increased to 60mg daily), amitriptyline (increased to 2 tablets at bedtime as needed), and cyclobenzaprine as needed.  M54.41 LUMBAGO WITH SCIATICA, RIGHT SIDE:   Increased duloxetine from 30mg to 60mg daily.   Increased amitriptyline from 1-2 tablets at bedtime as needed for pain.   Continued cyclobenzaprine as a muscle relaxant; advised the patient to monitor for sedation.   Scheduled follow-up with neurosurgery in approximately 2 weeks.   Scheduled follow-up visit with Interventional Pain Management Specialist at [facility].   Instructed the patient to have the neurosurgeon contact the office directly if there is anything they can do to assist in treatment.   Noted worsening pain on the right side, with sciatica symptoms affecting daily activities.   Observed pain radiating down the right leg, affecting the back, buttock, hip, and leg.   Reviewed MRI showing degenerative changes in the lower lumbar region with mild narrowing of neural  foramen and mild stenosis, potentially causing sciatica-type symptoms.   Noted previous exam showed pain on straight leg raise test on the right and tenderness in the right-sided piriformis muscle.   Assessed the condition as degenerative spine disease causing sciatica symptoms.   Continued multiple pain medications and muscle relaxants to manage symptoms.  I10 ESSENTIAL (PRIMARY) HYPERTENSION:   Continued nifedipine 60 mg daily for hypertension management.   Noted that the patient's hypertension is well controlled.       1. Lumbar radiculopathy due to degenerative joint disease of spine  -     DULoxetine (CYMBALTA) 60 MG capsule; Take 1 capsule (60 mg total) by mouth once daily.  Dispense: 30 capsule; Refill: 5    2. Chronic bilateral low back pain with right-sided sciatica  -     diclofenac sodium (VOLTAREN) 1 % Gel; Apply to painful joint area four times a day as needed  Dispense: 200 g; Refill: 5  -     DULoxetine (CYMBALTA) 60 MG capsule; Take 1 capsule (60 mg total) by mouth once daily.  Dispense: 30 capsule; Refill: 5    3. Chronic pain syndrome  -     DULoxetine (CYMBALTA) 60 MG capsule; Take 1 capsule (60 mg total) by mouth once daily.  Dispense: 30 capsule; Refill: 5    4. Essential hypertension  Assessment & Plan:  BP Readings from Last 6 Encounters:   08/26/24 138/79   08/25/24 (!) 173/81   08/13/24 (!) 150/86   07/15/24 136/83   06/25/24 124/82   06/19/24 133/84      Last 5 Patient Entered Readings                Current 30 Day Average: 130/74  Recent Readings 9/3/2024 9/2/2024 8/31/2024 8/30/2024 8/28/2024   SBP (mmHg) 105 134 118 129 157   DBP (mmHg) 58 78 62 74 76   Pulse 81 79 76 79 79                 Lab Results   Component Value Date    EGFRNORACEVR >60.0 06/25/2024    CREATININE 0.9 06/25/2024    BUN 13 06/25/2024    K 3.7 06/25/2024     06/25/2024     06/25/2024     Results for orders placed or performed in visit on 06/19/24   IN OFFICE EKG 12-LEAD (to [place])    Collection Time:  06/19/24  4:11 PM   Result Value Ref Range    QRS Duration 72 ms    [facility] QTC Calculation 447 ms    Narrative    Test Reason : R07.9,    Vent. Rate : 088 BPM     Atrial Rate : 088 BPM     P-R Int : 132 ms          QRS Dur : 072 ms      QT Int : 370 ms       P-R-T Axes : 063 000 -20 degrees     QTc Int : 447 ms    Sinus rhythm with Premature atrial complexes  Nonspecific T wave abnormality  Abnormal ECG  When compared with ECG of 06-OCT-2023 08:24,  Premature atrial complexes are now Present  Confirmed by JULIANA KNOWLES MD ([id number]) on 6/23/2024 4:28:47 PM    Referred By:             Confirmed By:JULIANA KNOWLES MD           No other significant complaints or concerns were reported.  Today's visit involved the intricate management of episodic problem(s) and the ongoing care for the patient's serious or complex condition(s) listed above, reflecting the inherent complexity of providing longitudinal, comprehensive evaluation and management as the central hub for the patient's primary care services.  [place] Board  Pharmacy Controlled Prescription Drug Monitoring database was queried and showed no activity to suggest abuse, diversion, or other improper use of prescription medications.   There were no vitals filed for this visit.  PHYSICAL EXAM:  GENERAL APPEARANCE:  - Alert and grossly oriented.  - No apparent distress, breathing comfortably.     EYES:  - Sclera without icterus.     EARS, NOSE, AND THROAT:  - No visible abnormalities.     RESPIRATORY:  - No respiratory distress.  - No audible wheezing or cough.     PSYCHIATRIC:  - Mood and affect appropriate; behavior cooperative.    This note was generated with the assistance of ambient listening technology. Verbal consent was obtained by the patient and accompanying visitor(s) for the recording of patient appointment to facilitate this note. I attest to having reviewed and edited the generated note for accuracy, though some syntax or spelling errors may persist. Please contact  "the author of this note for any clarification.      I spent a total of 30 minutes today evaluating and managing this patient for this encounter.  This includes face to face time and non-face to face time preparing to see the patient (eg, review of tests), obtaining and/or reviewing separately obtained history, documenting clinical information in the electronic or other health record, independently interpreting results and communicating results to the patient/family/caregiver, or care coordinator. This time was exclusive of any separately billable procedures for this patient and exclusive of time spent treating any other patient.    Documentation entered by me for this encounter may have been done in part using speech-recognition technology. Although I have made an effort to ensure accuracy, "sound like" errors may exist and should be interpreted in context.    Each patient to whom medical services are provided by telemedicine is: (1) informed of the relationship between the physician and patient and the respective role of any other health care provider with respect to management of the patient; and (2) notified that he or she may decline to receive medical services by telemedicine and may withdraw from such care at any time.  "

## 2024-09-09 ENCOUNTER — LAB VISIT (OUTPATIENT)
Dept: LAB | Facility: HOSPITAL | Age: 59
End: 2024-09-09
Attending: INTERNAL MEDICINE
Payer: MEDICAID

## 2024-09-09 DIAGNOSIS — Z94.4 LIVER REPLACED BY TRANSPLANT: ICD-10-CM

## 2024-09-09 DIAGNOSIS — R79.89 ELEVATED LIVER FUNCTION TESTS: ICD-10-CM

## 2024-09-09 LAB
ALBUMIN SERPL BCP-MCNC: 3.2 G/DL (ref 3.5–5.2)
ALP SERPL-CCNC: 171 U/L (ref 55–135)
ALT SERPL W/O P-5'-P-CCNC: 10 U/L (ref 10–44)
ANION GAP SERPL CALC-SCNC: 9 MMOL/L (ref 8–16)
AST SERPL-CCNC: 10 U/L (ref 10–40)
BASOPHILS # BLD AUTO: 0.07 K/UL (ref 0–0.2)
BASOPHILS NFR BLD: 0.6 % (ref 0–1.9)
BILIRUB SERPL-MCNC: 0.4 MG/DL (ref 0.1–1)
BUN SERPL-MCNC: 13 MG/DL (ref 6–20)
CALCIUM SERPL-MCNC: 8.8 MG/DL (ref 8.7–10.5)
CHLORIDE SERPL-SCNC: 105 MMOL/L (ref 95–110)
CO2 SERPL-SCNC: 25 MMOL/L (ref 23–29)
CREAT SERPL-MCNC: 1 MG/DL (ref 0.5–1.4)
DIFFERENTIAL METHOD BLD: ABNORMAL
EOSINOPHIL # BLD AUTO: 0.3 K/UL (ref 0–0.5)
EOSINOPHIL NFR BLD: 2.3 % (ref 0–8)
ERYTHROCYTE [DISTWIDTH] IN BLOOD BY AUTOMATED COUNT: 15.8 % (ref 11.5–14.5)
EST. GFR  (NO RACE VARIABLE): >60 ML/MIN/1.73 M^2
GGT SERPL-CCNC: 23 U/L (ref 8–55)
GLUCOSE SERPL-MCNC: 90 MG/DL (ref 70–110)
HCT VFR BLD AUTO: 40.5 % (ref 37–48.5)
HGB BLD-MCNC: 12.5 G/DL (ref 12–16)
IMM GRANULOCYTES # BLD AUTO: 0.05 K/UL (ref 0–0.04)
IMM GRANULOCYTES NFR BLD AUTO: 0.4 % (ref 0–0.5)
LYMPHOCYTES # BLD AUTO: 2.6 K/UL (ref 1–4.8)
LYMPHOCYTES NFR BLD: 20.9 % (ref 18–48)
MCH RBC QN AUTO: 25.3 PG (ref 27–31)
MCHC RBC AUTO-ENTMCNC: 30.9 G/DL (ref 32–36)
MCV RBC AUTO: 82 FL (ref 82–98)
MONOCYTES # BLD AUTO: 0.9 K/UL (ref 0.3–1)
MONOCYTES NFR BLD: 7.1 % (ref 4–15)
NEUTROPHILS # BLD AUTO: 8.7 K/UL (ref 1.8–7.7)
NEUTROPHILS NFR BLD: 68.7 % (ref 38–73)
NRBC BLD-RTO: 0 /100 WBC
PLATELET # BLD AUTO: 96 K/UL (ref 150–450)
PMV BLD AUTO: 11.5 FL (ref 9.2–12.9)
POTASSIUM SERPL-SCNC: 3.7 MMOL/L (ref 3.5–5.1)
PROT SERPL-MCNC: 6.9 G/DL (ref 6–8.4)
RBC # BLD AUTO: 4.95 M/UL (ref 4–5.4)
SODIUM SERPL-SCNC: 139 MMOL/L (ref 136–145)
WBC # BLD AUTO: 12.59 K/UL (ref 3.9–12.7)

## 2024-09-09 PROCEDURE — 80197 ASSAY OF TACROLIMUS: CPT | Performed by: INTERNAL MEDICINE

## 2024-09-09 PROCEDURE — 36415 COLL VENOUS BLD VENIPUNCTURE: CPT | Performed by: INTERNAL MEDICINE

## 2024-09-09 PROCEDURE — 85025 COMPLETE CBC W/AUTO DIFF WBC: CPT | Performed by: INTERNAL MEDICINE

## 2024-09-09 PROCEDURE — 82977 ASSAY OF GGT: CPT | Performed by: INTERNAL MEDICINE

## 2024-09-09 PROCEDURE — 80053 COMPREHEN METABOLIC PANEL: CPT | Performed by: INTERNAL MEDICINE

## 2024-09-10 ENCOUNTER — TELEPHONE (OUTPATIENT)
Dept: TRANSPLANT | Facility: CLINIC | Age: 59
End: 2024-09-10
Payer: MEDICAID

## 2024-09-10 DIAGNOSIS — Z94.4 LIVER REPLACED BY TRANSPLANT: Primary | ICD-10-CM

## 2024-09-10 LAB — TACROLIMUS BLD-MCNC: 5.5 NG/ML (ref 5–15)

## 2024-09-10 NOTE — TELEPHONE ENCOUNTER
Letter sent to patient stating: Your labs have been reviewed by your Transplant physician, no action required. Next labs due 12/16/24          ----- Message from Sterling Evnas MD sent at 9/10/2024 11:33 AM CDT -----  Results reviewed. No change in immunosuppression

## 2024-09-11 NOTE — PLAN OF CARE
No distress noted  
Pt discharged home, awake, alert, oriented x's 4,  denies any pain, no apparent distress noted. All questions and concerns addressed and answered, pt verbalizes understanding of discharge process, pt meets discharge criteria and is being discharged to car via wheelchair.   
Pt prepped for procedure  
General Sunscreen Counseling: I recommended a broad spectrum sunscreen with a SPF of 30 or higher.  I explained that SPF 30 sunscreens block approximately 97 percent of the sun's harmful rays.  Sunscreens should be applied at least 15 minutes prior to expected sun exposure and then every 2 hours after that as long as sun exposure continues. If swimming or exercising sunscreen should be reapplied every 45 minutes to an hour after getting wet or sweating.  One ounce, or the equivalent of a shot glass full of sunscreen, is adequate to protect the skin not covered by a bathing suit. I also recommended a lip balm with a sunscreen as well. Sun protective clothing can be used in lieu of sunscreen but must be worn the entire time you are exposed to the sun's rays.
Detail Level: Detailed

## 2024-09-18 NOTE — PROGRESS NOTES
Para completed, pt tolerated well. No apparent distress noted. 4.6 Liters removed from left abd, mepore applied CDI. Labs collected and sent. Report called to primary nurse, awaiting transport.    68-year-old female with past medical history of CAD status post multiple stents,  hypothyroid, rheumatoid arthritis, presenting with left-sided chest pain associated with generalized weakness, nausea and vomiting.  Patient ill-appearing, unable to provide much history.  History mostly supplied by patient's family member at bedside.  Symptoms started 2 days ago, however today she felt much worse with prompted her to come to the ED.

## 2024-09-30 ENCOUNTER — PATIENT MESSAGE (OUTPATIENT)
Dept: OTHER | Facility: OTHER | Age: 59
End: 2024-09-30
Payer: MEDICAID

## 2024-10-03 ENCOUNTER — TELEPHONE (OUTPATIENT)
Dept: CARDIOLOGY | Facility: CLINIC | Age: 59
End: 2024-10-03
Payer: MEDICAID

## 2024-10-03 DIAGNOSIS — Z00.00 ROUTINE ADULT HEALTH MAINTENANCE: ICD-10-CM

## 2024-10-03 DIAGNOSIS — Z76.89 ENCOUNTER TO ESTABLISH CARE: Primary | ICD-10-CM

## 2024-10-03 NOTE — TELEPHONE ENCOUNTER
Called and spoke to pt pt the patient rescheduled her appointment to Tuesday 10/15/24.     ----- Message from [employer] sent at 10/3/2024  8:09 AM CDT -----  Contact: self   .Type: Patient Call Back        Who called:   Patient      What is the request in detail:    Called concerning getting appt reschedule . Please call back   Can the clinic reply by [facility]?           Would the patient rather a call back or a response via [facility]?      call   Best call back number:  [phone]

## 2024-10-15 ENCOUNTER — OFFICE VISIT (OUTPATIENT)
Dept: CARDIOLOGY | Facility: CLINIC | Age: 59
End: 2024-10-15
Payer: MEDICAID

## 2024-10-15 ENCOUNTER — HOSPITAL ENCOUNTER (OUTPATIENT)
Dept: CARDIOLOGY | Facility: HOSPITAL | Age: 59
Discharge: HOME | End: 2024-10-15
Attending: INTERNAL MEDICINE
Payer: MEDICAID

## 2024-10-15 VITALS
HEART RATE: 84 BPM | BODY MASS INDEX: 41.78 KG/M2 | WEIGHT: 235.81 LBS | DIASTOLIC BLOOD PRESSURE: 86 MMHG | SYSTOLIC BLOOD PRESSURE: 122 MMHG | OXYGEN SATURATION: 97 % | HEIGHT: 63 IN

## 2024-10-15 DIAGNOSIS — Z76.89 ENCOUNTER TO ESTABLISH CARE: ICD-10-CM

## 2024-10-15 DIAGNOSIS — Z94.4 LIVER REPLACED BY TRANSPLANT: Primary | ICD-10-CM

## 2024-10-15 DIAGNOSIS — Z00.00 ROUTINE ADULT HEALTH MAINTENANCE: ICD-10-CM

## 2024-10-15 DIAGNOSIS — R06.02 SOB (SHORTNESS OF BREATH) ON EXERTION: ICD-10-CM

## 2024-10-15 DIAGNOSIS — R94.31 NONSPECIFIC ABNORMAL ELECTROCARDIOGRAM (ECG) (EKG): ICD-10-CM

## 2024-10-15 DIAGNOSIS — G89.4 CHRONIC PAIN SYNDROME: Chronic | ICD-10-CM

## 2024-10-15 DIAGNOSIS — G47.33 OSA ON CPAP: Chronic | ICD-10-CM

## 2024-10-15 DIAGNOSIS — D50.9 IRON DEFICIENCY ANEMIA, UNSPECIFIED IRON DEFICIENCY ANEMIA TYPE: ICD-10-CM

## 2024-10-15 DIAGNOSIS — E78.2 MIXED HYPERLIPIDEMIA: ICD-10-CM

## 2024-10-15 DIAGNOSIS — M47.27 LUMBOSACRAL RADICULOPATHY DUE TO DEGENERATIVE JOINT DISEASE OF SPINE: Chronic | ICD-10-CM

## 2024-10-15 DIAGNOSIS — R07.9 CHEST PAIN, UNSPECIFIED TYPE: ICD-10-CM

## 2024-10-15 DIAGNOSIS — E55.9 VITAMIN D DEFICIENCY: ICD-10-CM

## 2024-10-15 DIAGNOSIS — D63.8 ANEMIA OF CHRONIC DISEASE: ICD-10-CM

## 2024-10-15 DIAGNOSIS — E66.01 CLASS 2 SEVERE OBESITY DUE TO EXCESS CALORIES WITH SERIOUS COMORBIDITY AND BODY MASS INDEX (BMI) OF 36.0 TO 36.9 IN ADULT: Chronic | ICD-10-CM

## 2024-10-15 DIAGNOSIS — Z86.16 HISTORY OF COVID-19: ICD-10-CM

## 2024-10-15 DIAGNOSIS — E66.812 CLASS 2 SEVERE OBESITY DUE TO EXCESS CALORIES WITH SERIOUS COMORBIDITY AND BODY MASS INDEX (BMI) OF 36.0 TO 36.9 IN ADULT: Chronic | ICD-10-CM

## 2024-10-15 LAB
OHS QRS DURATION: 78 MS
OHS QTC CALCULATION: 418 MS

## 2024-10-15 PROCEDURE — G2211 COMPLEX E/M VISIT ADD ON: HCPCS | Mod: S$PBB,,, | Performed by: INTERNAL MEDICINE

## 2024-10-15 PROCEDURE — 99205 OFFICE O/P NEW HI 60 MIN: CPT | Mod: S$PBB,,, | Performed by: INTERNAL MEDICINE

## 2024-10-15 PROCEDURE — 3008F BODY MASS INDEX DOCD: CPT | Mod: CPTII,,, | Performed by: INTERNAL MEDICINE

## 2024-10-15 PROCEDURE — 3074F SYST BP LT 130 MM HG: CPT | Mod: CPTII,,, | Performed by: INTERNAL MEDICINE

## 2024-10-15 PROCEDURE — 99215 OFFICE O/P EST HI 40 MIN: CPT | Mod: PBBFAC | Performed by: INTERNAL MEDICINE

## 2024-10-15 PROCEDURE — 3079F DIAST BP 80-89 MM HG: CPT | Mod: CPTII,,, | Performed by: INTERNAL MEDICINE

## 2024-10-15 PROCEDURE — 93010 ELECTROCARDIOGRAM REPORT: CPT | Mod: ,,, | Performed by: INTERNAL MEDICINE

## 2024-10-15 PROCEDURE — 93005 ELECTROCARDIOGRAM TRACING: CPT

## 2024-10-15 PROCEDURE — 4010F ACE/ARB THERAPY RXD/TAKEN: CPT | Mod: CPTII,,, | Performed by: INTERNAL MEDICINE

## 2024-10-15 PROCEDURE — 1159F MED LIST DOCD IN RCRD: CPT | Mod: CPTII,,, | Performed by: INTERNAL MEDICINE

## 2024-10-15 PROCEDURE — 99999 PR PBB SHADOW E&M-EST. PATIENT-LVL V: CPT | Mod: PBBFAC,,, | Performed by: INTERNAL MEDICINE

## 2024-10-15 PROCEDURE — 1160F RVW MEDS BY RX/DR IN RCRD: CPT | Mod: CPTII,,, | Performed by: INTERNAL MEDICINE

## 2024-10-15 RX ORDER — NITROGLYCERIN 0.4 MG/1
0.4 TABLET SUBLINGUAL EVERY 5 MIN PRN
Qty: 30 TABLET | Refills: 0 | Status: SHIPPED | OUTPATIENT
Start: 2024-10-15 | End: 2025-10-15

## 2024-10-15 NOTE — PROGRESS NOTES
Subjective:   Patient ID:  Marcie Bazan is a 59 y.o. female who presents for cardiac consult of Fatigue and Shortness of Breath      Referral by: Cely Owens, Np  [id number] [facility]  [place] [address]     Reason for consult: CP      HPI  The patient came in today for cardiac consult of Fatigue and Shortness of Breath    10/15/24  Marcie Bazan is a 59 y.o. female pt with HTN, HLD, h/o liver transplant - '2017, h/o COVID 19, chronic back/knee pain, BRANNON, obesity, FE def anemia presents for CV eval of chest pain.    BP and HR stable. BMI 41 - 235 lbs   Pt had prior stress and ECHO in 2020.   She has more CP/SOB - feels like pressure and more fatigue lately.   She has been to urgent care about 4 months ago.     ECG - NSR, low volt QRS, poor RWP      No cardiac monitor results found for the past 12 months     Past Medical History:   Diagnosis Date    Alcohol abuse     Alcoholic hepatitis     Anemia of chronic disease     Arthritis     generialized    Cancer 12/26/2017    liver    Coagulopathy     Dyspnea on exertion 3/26/2020    Encounter for blood transfusion     End stage liver disease     History of alcohol abuse     Hyperlipidemia     Hypersomnia 9/11/2020    Hypertension     Hyponatremia     Liver cirrhosis     Liver transplant candidate 12/26/2017    Obesity (BMI 30-39.9) 1/5/2016    Prediabetes 4/22/2023    Sleep apnea        Past Surgical History:   Procedure Laterality Date    BILATERAL SALPINGO-OOPHORECTOMY (BSO) Bilateral 1986    with hysterectomy done for abnormal cells    BREAST BIOPSY Left     benign    BREAST LUMPECTOMY      patient is unsure of date or laterality    CATARACT EXTRACTION W/  INTRAOCULAR LENS IMPLANT Right 9/20/2023    Procedure: EXTRACTION, CATARACT, WITH IOL INSERTION;  Surgeon: Kim Mullen MD;  Location: [facility] OR;  Service: Ophthalmology;  Laterality: Right;  Per Elidia Viveros change patient from left eye to right on 9/11 @ 2:38p    CATARACT EXTRACTION W/   INTRAOCULAR LENS IMPLANT Left 10/11/2023    Procedure: EXTRACTION, CATARACT, WITH IOL INSERTION;  Surgeon: Kim Mullen MD;  Location: [facility] OR;  Service: Ophthalmology;  Laterality: Left;  Shadi Rosa change the patient to Left and to 10/11 on 9/8 @8:49Am    CHOLECYSTECTOMY      COLONOSCOPY N/A 12/01/2017    Procedure: COLONOSCOPY;  Surgeon: Filemon Fuentes MD;  Location: [facility] (2ND FLR);  Service: Endoscopy;  Laterality: N/A;    COLONOSCOPY N/A 12/05/2017    Procedure: COLONOSCOPY;  Surgeon: José Chavira MD;  Location: [facility] ENDO (2ND FLR);  Service: Endoscopy;  Laterality: N/A;    COLONOSCOPY N/A 12/12/2022    Procedure: COLONOSCOPY;  Surgeon: Gogo Brown MD;  Location: [facility] ENDO;  Service: Endoscopy;  Laterality: N/A;    EPIDURAL STEROID INJECTION N/A 2/28/2023    Procedure: L4/5 IL WILBERTO (with right paramedian approach);  Surgeon: Ras Caballero MD;  Location: [facility] PAIN MGT;  Service: Pain Management;  Laterality: N/A;    EPIDURAL STEROID INJECTION INTO CERVICAL SPINE N/A 01/04/2022    Procedure: C5/6 IL WILBERTO;  Surgeon: Ras Caballero MD;  Location: [facility] PAIN MGT;  Service: Pain Management;  Laterality: N/A;    ESOPHAGOGASTRODUODENOSCOPY N/A 9/14/2023    Procedure: EGD (ESOPHAGOGASTRODUODENOSCOPY);  Surgeon: Angie Zimmerman MD;  Location: [facility] ENDO;  Service: Endoscopy;  Laterality: N/A;    EXAMINATION UNDER ANESTHESIA N/A 02/18/2020    Procedure: EXAM UNDER ANESTHESIA;  Surgeon: Alden Horowitz MD;  Location: [facility] OR;  Service: General;  Laterality: N/A;    EXCISIONAL HEMORRHOIDECTOMY N/A 02/18/2020    Procedure: HEMORRHOIDECTOMY;  Surgeon: Alden Horowitz MD;  Location: [facility] OR;  Service: General;  Laterality: N/A;    HYSTERECTOMY  1986    due to abnormal paps - ovaries were removed    INJECTION OF ANESTHETIC AGENT AROUND PUDENDAL NERVE N/A 02/18/2020    Procedure: BLOCK, NERVE, PUDENDAL;  Surgeon: Alden Horowitz MD;  Location: [facility];  Service: General;  Laterality:  N/A;    INJECTION OF ANESTHETIC AGENT INTO SACROILIAC JOINT Right 06/14/2019    Procedure: right Sacroiliac Joint Injection;  Surgeon: David Desai MD;  Location: [facility] PAIN MGT;  Service: Pain Management;  Laterality: Right;    INJECTION OF ANESTHETIC AGENT INTO SACROILIAC JOINT Bilateral 02/07/2020    Procedure: Bilateral GT bursa + Bilateral Sacroiliac Joint Injection;  Surgeon: David Desai MD;  Location: [facility] PAIN MGT;  Service: Pain Management;  Laterality: Bilateral;    INJECTION OF ANESTHETIC AGENT INTO SACROILIAC JOINT Bilateral 07/07/2020    Procedure: Bilateral GT bursa +SIJ injection;  Surgeon: David Desai MD;  Location: [facility] PAIN MGT;  Service: Pain Management;  Laterality: Bilateral;    INJECTION OF ANESTHETIC AGENT INTO SACROILIAC JOINT Right 10/21/2020    Procedure: Right piriformis + right SIJ + right GT bursa injection;  Surgeon: David Desai MD;  Location: [facility] PAIN MGT;  Service: Pain Management;  Laterality: Right;    INJECTION OF ANESTHETIC AGENT INTO SACROILIAC JOINT Bilateral 04/15/2021    Procedure: Bilateral SIJ + Bilateral Piriformis + Bilateral GT Bursa Injection;  Surgeon: Flaco Frazier MD;  Location: [facility] PAIN MGT;  Service: Pain Management;  Laterality: Bilateral;    INJECTION OF ANESTHETIC AGENT INTO SACROILIAC JOINT Bilateral 04/21/2022    Procedure: Bilateral SIJ + Bilateral Piriformis + Bilateral GT Bursa Injection;  Surgeon: Ras Caballero MD;  Location: [facility] PAIN MGT;  Service: Pain Management;  Laterality: Bilateral;    INJECTION OF ANESTHETIC AGENT INTO SACROILIAC JOINT Right 4/18/2023    Procedure: Right SIJ + Right piriformis +/- Right GT bursa injection;  Surgeon: Ras Caballero MD;  Location: [facility] PAIN MGT;  Service: Pain Management;  Laterality: Right;    INJECTION OF JOINT Bilateral 02/07/2020    Procedure: Bilateral GT bursa + Bilateral Sacroiliac Joint Injection;  Surgeon: David Desai MD;  Location: [facility] PAIN MGT;  Service: Pain Management;   Laterality: Bilateral;    INJECTION OF JOINT Bilateral 07/07/2020    Procedure: Bilateral GT bursa +SIJ injection;  Surgeon: David Desai MD;  Location: [facility] PAIN MGT;  Service: Pain Management;  Laterality: Bilateral;    INJECTION OF JOINT Right 10/21/2020    Procedure: Right piriformis + right SIJ + right GT bursa injection;  Surgeon: David Desai MD;  Location: [facility] PAIN MGT;  Service: Pain Management;  Laterality: Right;    INJECTION OF JOINT Bilateral 02/08/2021    Procedure: Bilateral GT Bursa Injection;  Surgeon: Anna Kaminski MD;  Location: [facility] PAIN MGT;  Service: Pain Management;  Laterality: Bilateral;    INJECTION OF JOINT Bilateral 04/15/2021    Procedure: Bilateral SIJ + Bilateral Piriformis + Bilateral GT Bursa Injection;  Surgeon: Flaco Frazier MD;  Location: [facility] PAIN MGT;  Service: Pain Management;  Laterality: Bilateral;    INJECTION OF JOINT Bilateral 04/21/2022    Procedure: Bilateral SIJ + Bilateral Piriformis + Bilateral GT Bursa Injection;  Surgeon: Ras Caballero MD;  Location: [facility] PAIN MGT;  Service: Pain Management;  Laterality: Bilateral;    INJECTION OF JOINT Right 10/11/2022    Procedure: right SIJ + right piriformis + right GT bursa injection;  Surgeon: Ras Caballero MD;  Location: [facility] PAIN MGT;  Service: Pain Management;  Laterality: Right;    INJECTION OF PIRIFORMIS MUSCLE Right 06/14/2019    Procedure: Right piriformis injection;  Surgeon: David Desai MD;  Location: [facility] PAIN MGT;  Service: Pain Management;  Laterality: Right;    INJECTION OF PIRIFORMIS MUSCLE Right 10/21/2020    Procedure: Right piriformis + right SIJ + right GT bursa injection;  Surgeon: David Desai MD;  Location: [facility] PAIN MGT;  Service: Pain Management;  Laterality: Right;    INJECTION OF PIRIFORMIS MUSCLE Bilateral 04/15/2021    Procedure: Bilateral SIJ + Bilateral Piriformis + Bilateral GT Bursa Injection;  Surgeon: Flaco Frazier MD;  Location: [facility] PAIN MGT;  Service: Pain  Management;  Laterality: Bilateral;    INJECTION OF PIRIFORMIS MUSCLE Bilateral 04/21/2022    Procedure: Bilateral SIJ + Bilateral Piriformis + Bilateral GT Bursa Injection;  Surgeon: Ras Caballero MD;  Location: [facility] PAIN MGT;  Service: Pain Management;  Laterality: Bilateral;    LIVER TRANSPLANT  12/2017    SELECTIVE INJECTION OF ANESTHETIC AGENT AROUND LUMBAR SPINAL NERVE ROOT BY TRANSFORAMINAL APPROACH Bilateral 10/28/2021    Procedure: BLOCK, SPINAL NERVE ROOT, LUMBAR, SELECTIVE, TRANSFORAMINAL APPROACH bilateral L4-5 and Right Knee injection with RN IV sedation;  Surgeon: Flaco Frazier MD;  Location: [facility] PAIN MGT;  Service: Pain Management;  Laterality: Bilateral;    SELECTIVE INJECTION OF ANESTHETIC AGENT AROUND LUMBAR SPINAL NERVE ROOT BY TRANSFORAMINAL APPROACH Bilateral 08/18/2022    Procedure: Bilateral L4/5 TF WILBERTO;  Surgeon: Ras Caballero MD;  Location: [facility] PAIN MGT;  Service: Pain Management;  Laterality: Bilateral;    TRANSFORAMINAL EPIDURAL INJECTION OF STEROID Right 8/15/2023    Procedure: right L5/S1 + S1 TF WILBERTO;  Surgeon: Ras Caballero MD;  Location: [facility] PAIN MGT;  Service: Pain Management;  Laterality: Right;       Social History     Tobacco Use    Smoking status: Never    Smokeless tobacco: Never   Substance Use Topics    Alcohol use: No     Comment: Currently admitted to inpatient rehab 7/2017    Drug use: No       Family History   Problem Relation Name Age of Onset    Hypertension Mother      Alzheimer's disease Mother      Hypertension Father      Diabetes Father      Diabetes Sister      Ovarian cancer Paternal Aunt      Depression Maternal Grandfather         Patient's Medications   New Prescriptions    NITROGLYCERIN (NITROSTAT) 0.4 MG SL TABLET    Place 1 tablet (0.4 mg total) under the tongue every 5 (five) minutes as needed for Chest pain.   Previous Medications    AMITRIPTYLINE (ELAVIL) 25 MG TABLET    TAKE 1 TO 2 TABLETS(25 TO 50 MG) BY MOUTH EVERY NIGHT AS NEEDED FOR  INSOMNIA OR PAIN    ATORVASTATIN (LIPITOR) 40 MG TABLET    Take 1 tablet (40 mg total) by mouth every evening.    COLLAGEN-BIOTIN-ASCORBIC ACID ORAL    Take by mouth.    CYCLOBENZAPRINE (FLEXERIL) 10 MG TABLET    TAKE 1/2 TO 1 TABLET BY MOUTH THREE TIMES DAILY AS NEEDED FOR MUSCLE SPASMS    DICLOFENAC SODIUM (VOLTAREN) 1 % GEL    Apply to painful joint area four times a day as needed    DULOXETINE (CYMBALTA) 60 MG CAPSULE    Take 1 capsule (60 mg total) by mouth once daily.    ERGOCALCIFEROL (ERGOCALCIFEROL) 50,000 UNIT CAP    Take 1 capsule (50,000 Units total) by mouth every 7 days.    FAMOTIDINE (PEPCID) 20 MG TABLET    TAKE 1 TABLET(20 MG) BY MOUTH TWICE DAILY.    FLUTICASONE PROPIONATE (FLONASE) 50 MCG/ACTUATION NASAL SPRAY    2 sprays (100 mcg total) by Each Nostril route once daily.    FOLIC ACID (FOLVITE) 1 MG TABLET    Take 1 tablet (1 mg total) by mouth once daily.    FUROSEMIDE (LASIX) 40 MG TABLET    Take 1 tablet (40 mg total) by mouth once daily.    LEVOCETIRIZINE (XYZAL) 5 MG TABLET    Take 1 tablet (5 mg total) by mouth every evening.    MAGNESIUM OXIDE (MAG-OX) 400 MG (241.3 MG MAGNESIUM) TABLET    Take 1 tablet (400 mg total) by mouth once daily.    NIFEDIPINE (PROCARDIA-XL) 60 MG (OSM) 24 HR TABLET    Take 1 tablet (60 mg total) by mouth once daily.    PANTOPRAZOLE (PROTONIX) 40 MG TABLET    Take 1 tablet (40 mg total) by mouth 2 (two) times daily.    POTASSIUM CHLORIDE (KLOR-CON) 10 MEQ TBSR    Take 1 tablet (10 mEq total) by mouth 2 (two) times daily.    PREGABALIN (LYRICA) 225 MG CAP    TAKE 1 CAPSULE BY MOUTH TWICE DAILY    TACROLIMUS (PROGRAF) 1 MG CAP    Take 2 capsules (2 mg total) by mouth every morning AND 1 capsule (1 mg total) every evening.    VALSARTAN (DIOVAN) 80 MG TABLET    Take 1 tablet (80 mg total) by mouth once daily. Stop losartan   Modified Medications    No medications on file   Discontinued Medications    No medications on file       Review of Systems   Constitutional:  "Negative.    HENT: Negative.     Eyes: Negative.    Respiratory: Negative.     Cardiovascular:  Positive for chest pain.   Gastrointestinal: Negative.    Genitourinary: Negative.    Musculoskeletal: Negative.    Skin: Negative.    Neurological: Negative.    Endo/Heme/Allergies: Negative.    Psychiatric/Behavioral: Negative.     All 12 systems otherwise negative.      Wt Readings from Last 3 Encounters:   10/15/24 107 kg (235 lb 12.9 oz)   08/26/24 102.3 kg (225 lb 8.5 oz)   08/25/24 99.8 kg (220 lb)     Temp Readings from Last 3 Encounters:   08/26/24 98.1 °F (36.7 °C) (Tympanic)   08/25/24 98.5 °F (36.9 °C) (Oral)   06/25/24 97 °F (36.1 °C) (Tympanic)     BP Readings from Last 3 Encounters:   10/15/24 122/86   08/26/24 138/79   08/25/24 (!) 173/81     Pulse Readings from Last 3 Encounters:   10/15/24 84   08/26/24 90   08/25/24 90       /86   Pulse 84   Ht 5' 3" (1.6 m)   Wt 107 kg (235 lb 12.9 oz)   LMP 01/01/1985 (Approximate) Comment: 1985  SpO2 97%   BMI 41.77 kg/m²     Objective:   Physical Exam  Vitals and nursing note reviewed.   Constitutional:       General: She is not in acute distress.     Appearance: She is well-developed. She is obese. She is not diaphoretic.   HENT:      Head: Normocephalic and atraumatic.      Nose: Nose normal.   Eyes:      General: No scleral icterus.     Conjunctiva/sclera: Conjunctivae normal.   Neck:      Thyroid: No thyromegaly.      Vascular: No JVD.   Cardiovascular:      Rate and Rhythm: Normal rate and regular rhythm.      Heart sounds: S1 normal and S2 normal. Murmur heard.      No friction rub. No gallop. No S3 or S4 sounds.   Pulmonary:      Effort: Pulmonary effort is normal. No respiratory distress.      Breath sounds: Normal breath sounds. No stridor. No wheezing or rales.   Chest:      Chest wall: No tenderness.   Abdominal:      General: Bowel sounds are normal. There is no distension.      Palpations: Abdomen is soft. There is no mass.      Tenderness: " There is no abdominal tenderness. There is no rebound.   Genitourinary:     Comments: Deferred  Musculoskeletal:         General: No tenderness or deformity. Normal range of motion.      Cervical back: Normal range of motion and neck supple.   Lymphadenopathy:      Cervical: No cervical adenopathy.   Skin:     General: Skin is warm and dry.      Coloration: Skin is not pale.      Findings: No erythema or rash.   Neurological:      Mental Status: She is alert and oriented to person, place, and time.      Motor: No abnormal muscle tone.      Coordination: Coordination normal.   Psychiatric:         Behavior: Behavior normal.         Thought Content: Thought content normal.         Judgment: Judgment normal.         Lab Results   Component Value Date     09/09/2024    K 3.7 09/09/2024     09/09/2024    CO2 25 09/09/2024    BUN 13 09/09/2024    CREATININE 1.0 09/09/2024    GLU 90 09/09/2024    HGBA1C 5.7 (H) 10/06/2023    MG 1.4 (L) 06/13/2024    AST 10 09/09/2024    ALT 10 09/09/2024    ALBUMIN 3.2 (L) 09/09/2024    PROT 6.9 09/09/2024    BILITOT 0.4 09/09/2024    WBC 12.59 09/09/2024    HGB 12.5 09/09/2024    HCT 40.5 09/09/2024    HCT 28 (L) 12/26/2017    MCV 82 09/09/2024    PLT 96 (L) 09/09/2024    INR 1.0 02/27/2020    TSH 1.157 01/31/2024    CHOL 127 04/17/2023    HDL 40 04/17/2023    LDLCALC 66.2 04/17/2023    TRIG 104 04/17/2023    BNP 11 06/25/2024         BNP (pg/mL)   Date Value   06/25/2024 11   07/29/2021 <10   02/27/2020 <10   03/09/2018 117 (H)   02/12/2018 15     INR (no units)   Date Value   02/27/2020 1.0   03/09/2018 1.0   03/09/2018 1.0   02/27/2018 1.1          Assessment:      1. Liver replaced by transplant    2. Chest pain, unspecified type    3. SOB (shortness of breath) on exertion    4. Mixed hyperlipidemia    5. Vitamin D deficiency    6. Iron deficiency anemia, unspecified iron deficiency anemia type    7. History of COVID-19    8. Class 2 severe obesity due to excess calories  with serious comorbidity and body mass index (BMI) of 36.0 to 36.9 in adult    9. BRANNON on CPAP    10. Chronic pain syndrome    11. Lumbar radiculopathy due to degenerative joint disease of spine    12. Anemia of chronic disease    13. Nonspecific abnormal electrocardiogram (ECG) (EKG)        Plan:     Chest pain, SOB  - has been having more pressure/JADE and fluid   - order pharm nuclear stress test, pt cannot walk on treadmill  - order ECHO   - order iron levels, start PRN NTG   - refer to pulm    2. HLD  - cont statin    3. H/O Liver transplant  - cont f/u with transplant team    4. Chronic pain, lumbar radic  - cont pain tx    5. ACD, Fe def, Vit D def  - cont supplements and monitor  - had h/o Iron infusions     6. BRANNON, seasonal allergies, h/o COVID 19   -cont CPAP, f/u pulm as needed    7. Obesity BMI 41 - 235 lbs   - cont weight loss    Visit today included increased complexity associated with the care of the episodic problem chest pain addressed and managing the longitudinal care of the patient due to the serious and/or complex managed problem(s) .      Thank you for allowing me to participate in this patient's care. Please do not hesitate to contact me with any questions or concerns. Consult note has been forwarded to the referral physician.

## 2024-10-16 ENCOUNTER — TELEPHONE (OUTPATIENT)
Dept: CARDIOLOGY | Facility: CLINIC | Age: 59
End: 2024-10-16
Payer: MEDICAID

## 2024-10-16 RX ORDER — FERROUS SULFATE 325(65) MG
325 TABLET ORAL
Qty: 90 TABLET | Refills: 1 | Status: SHIPPED | OUTPATIENT
Start: 2024-10-16

## 2024-10-16 NOTE — TELEPHONE ENCOUNTER
Contacted patient to discuss results and to set up an appt;patient received and understood results with no questions or concerns;patient also accepted appt with .       ----- Message from Olman Wick MD sent at 10/16/2024  9:28 AM CDT -----  Please contact the patient and let them know that their results of labs reveal low iron levels - starting iron sulfate 325 mg daily - recommend follow up with PCP or myself in 6-8 weeks will repeat labs then and may need Heme/onc for IV infusion therapy. Thanks   
4

## 2024-10-31 ENCOUNTER — HOSPITAL ENCOUNTER (OUTPATIENT)
Dept: CARDIOLOGY | Facility: HOSPITAL | Age: 59
Discharge: HOME | End: 2024-10-31
Attending: INTERNAL MEDICINE
Payer: MEDICAID

## 2024-10-31 ENCOUNTER — HOSPITAL ENCOUNTER (OUTPATIENT)
Dept: RADIOLOGY | Facility: HOSPITAL | Age: 59
Discharge: HOME | End: 2024-10-31
Attending: INTERNAL MEDICINE
Payer: MEDICAID

## 2024-10-31 ENCOUNTER — HOSPITAL ENCOUNTER (OUTPATIENT)
Dept: PULMONOLOGY | Facility: HOSPITAL | Age: 59
Discharge: HOME | End: 2024-10-31
Attending: INTERNAL MEDICINE
Payer: MEDICAID

## 2024-10-31 VITALS
WEIGHT: 235 LBS | SYSTOLIC BLOOD PRESSURE: 152 MMHG | DIASTOLIC BLOOD PRESSURE: 87 MMHG | HEART RATE: 75 BPM | HEIGHT: 63 IN | BODY MASS INDEX: 41.64 KG/M2

## 2024-10-31 VITALS
WEIGHT: 235 LBS | HEIGHT: 63 IN | SYSTOLIC BLOOD PRESSURE: 122 MMHG | BODY MASS INDEX: 41.64 KG/M2 | DIASTOLIC BLOOD PRESSURE: 86 MMHG

## 2024-10-31 DIAGNOSIS — R94.31 NONSPECIFIC ABNORMAL ELECTROCARDIOGRAM (ECG) (EKG): ICD-10-CM

## 2024-10-31 DIAGNOSIS — R07.9 CHEST PAIN, UNSPECIFIED TYPE: ICD-10-CM

## 2024-10-31 LAB
AORTIC ROOT ANNULUS: 2.8 CM
ASCENDING AORTA: 2.99 CM
AV INDEX (PROSTH): 0.72
AV MEAN GRADIENT: 4.7 MMHG
AV PEAK GRADIENT: 7.8 MMHG
AV VALVE AREA BY VELOCITY RATIO: 2 CM²
AV VALVE AREA: 2 CM²
AV VELOCITY RATIO: 0.71
BSA FOR ECHO PROCEDURE: 2.18 M2
CV ECHO LV RWT: 0.54 CM
CV STRESS BASE HR: 75 BPM
DIASTOLIC BLOOD PRESSURE: 87 MMHG
DOP CALC AO PEAK VEL: 1.4 M/S
DOP CALC AO VTI: 31.4 CM
DOP CALC LVOT AREA: 2.8 CM2
DOP CALC LVOT DIAMETER: 1.9 CM
DOP CALC LVOT PEAK VEL: 1 M/S
DOP CALC LVOT STROKE VOLUME: 63.8 CM3
DOP CALC RVOT PEAK VEL: 0.83 M/S
DOP CALC RVOT VTI: 17.6 CM
DOP CALCLVOT PEAK VEL VTI: 22.5 CM
E WAVE DECELERATION TIME: 220.48 MSEC
E/A RATIO: 0.84
E/E' RATIO: 7.3 M/S
ECHO LV POSTERIOR WALL: 1.1 CM (ref 0.6–1.1)
EJECTION FRACTION- HIGH: 73 %
END DIASTOLIC INDEX-HIGH: 165 ML/M2
END DIASTOLIC INDEX-LOW: 101 ML/M2
END SYSTOLIC INDEX-HIGH: 64 ML/M2
END SYSTOLIC INDEX-LOW: 28 ML/M2
FRACTIONAL SHORTENING: 36.6 % (ref 28–44)
INTERVENTRICULAR SEPTUM: 1 CM (ref 0.6–1.1)
IVC DIAMETER: 1.36 CM
IVRT: 45.67 MSEC
LA MAJOR: 6.22 CM
LA WIDTH: 3.1 CM
LEFT ATRIUM AREA SYSTOLIC (APICAL 2 CHAMBER): 14.02 CM2
LEFT ATRIUM AREA SYSTOLIC (APICAL 4 CHAMBER): 17.84 CM2
LEFT ATRIUM SIZE: 3.9 CM
LEFT ATRIUM VOLUME INDEX MOD: 17.7 ML/M2
LEFT ATRIUM VOLUME MOD: 36.67 ML
LEFT INTERNAL DIMENSION IN SYSTOLE: 2.6 CM (ref 2.1–4)
LEFT VENTRICLE DIASTOLIC VOLUME INDEX: 36.62 ML/M2
LEFT VENTRICLE DIASTOLIC VOLUME: 75.8 ML
LEFT VENTRICLE END SYSTOLIC VOLUME APICAL 2 CHAMBER: 29.51 ML
LEFT VENTRICLE END SYSTOLIC VOLUME APICAL 4 CHAMBER: 42.52 ML
LEFT VENTRICLE MASS INDEX: 68.4 G/M2
LEFT VENTRICLE SYSTOLIC VOLUME INDEX: 11.4 ML/M2
LEFT VENTRICLE SYSTOLIC VOLUME: 23.67 ML
LEFT VENTRICULAR INTERNAL DIMENSION IN DIASTOLE: 4.1 CM (ref 3.5–6)
LEFT VENTRICULAR MASS: 141.5 G
LV LATERAL E/E' RATIO: 7.64 M/S
LV SEPTAL E/E' RATIO: 7 M/S
LVED V (TEICH): 75.8 ML
LVES V (TEICH): 23.67 ML
LVOT MG: 2.51 MMHG
LVOT MV: 0.76 CM/S
MV PEAK A VEL: 1 M/S
MV PEAK E VEL: 0.84 M/S
MV STENOSIS PRESSURE HALF TIME: 63.94 MS
MV VALVE AREA P 1/2 METHOD: 3.44 CM2
NUC REST EJECTION FRACTION: 79
NUC STRESS EJECTION FRACTION: 79 %
OHS CV CPX 85 PERCENT MAX PREDICTED HEART RATE MALE: 135
OHS CV CPX MAX PREDICTED HEART RATE: 159
OHS CV CPX PATIENT IS FEMALE: 1
OHS CV CPX PATIENT IS MALE: 0
OHS CV CPX PEAK DIASTOLIC BLOOD PRESSURE: 87 MMHG
OHS CV CPX PEAK HEAR RATE: 100 BPM
OHS CV CPX PEAK RATE PRESSURE PRODUCT: NORMAL
OHS CV CPX PEAK SYSTOLIC BLOOD PRESSURE: 152 MMHG
OHS CV CPX PERCENT MAX PREDICTED HEART RATE ACHIEVED: 66
OHS CV CPX RATE PRESSURE PRODUCT PRESENTING: NORMAL
OHS CV INITIAL DOSE: 8.2 MCG/KG/MIN
OHS CV PEAK DOSE: 27 MCG/KG/MIN
PISA MRMAX VEL: 4.82 M/S
PISA TR MAX VEL: 3.16 M/S
PV MEAN GRADIENT: 1 MMHG
RA MAJOR: 4.21 CM
RA PRESSURE ESTIMATED: 3 MMHG
RA WIDTH: 3 CM
RETIRED EF AND QEF - SEE NOTES: 59 %
RV TB RVSP: 6 MMHG
STJ: 3.01 CM
SYSTOLIC BLOOD PRESSURE: 152 MMHG
TDI LATERAL: 0.11 M/S
TDI SEPTAL: 0.12 M/S
TDI: 0.12 M/S
TR MAX PG: 40 MMHG
TRICUSPID ANNULAR PLANE SYSTOLIC EXCURSION: 1.55 CM
TV REST PULMONARY ARTERY PRESSURE: 43 MMHG
Z-SCORE OF LEFT VENTRICULAR DIMENSION IN END DIASTOLE: -4.29
Z-SCORE OF LEFT VENTRICULAR DIMENSION IN END SYSTOLE: -3.11

## 2024-10-31 PROCEDURE — 93017 CV STRESS TEST TRACING ONLY: CPT

## 2024-10-31 PROCEDURE — 63600175 PHARM REV CODE 636 W HCPCS: Performed by: INTERNAL MEDICINE

## 2024-10-31 PROCEDURE — 93306 TTE W/DOPPLER COMPLETE: CPT | Mod: 26,,, | Performed by: INTERNAL MEDICINE

## 2024-10-31 PROCEDURE — A9502 TC99M TETROFOSMIN: HCPCS | Performed by: INTERNAL MEDICINE

## 2024-10-31 PROCEDURE — 93306 TTE W/DOPPLER COMPLETE: CPT

## 2024-10-31 PROCEDURE — 78452 HT MUSCLE IMAGE SPECT MULT: CPT

## 2024-10-31 RX ORDER — REGADENOSON 0.08 MG/ML
0.4 INJECTION, SOLUTION INTRAVENOUS ONCE
Status: COMPLETED | OUTPATIENT
Start: 2024-10-31 | End: 2024-10-31

## 2024-10-31 RX ADMIN — TETROFOSMIN 27 MILLICURIE: 1.38 INJECTION, POWDER, LYOPHILIZED, FOR SOLUTION INTRAVENOUS at 10:10

## 2024-10-31 RX ADMIN — REGADENOSON 0.4 MG: 0.08 INJECTION, SOLUTION INTRAVENOUS at 10:10

## 2024-10-31 RX ADMIN — TETROFOSMIN 8.2 MILLICURIE: 1.38 INJECTION, POWDER, LYOPHILIZED, FOR SOLUTION INTRAVENOUS at 08:10

## 2024-11-05 ENCOUNTER — PATIENT MESSAGE (OUTPATIENT)
Dept: CARDIOLOGY | Facility: CLINIC | Age: 59
End: 2024-11-05
Payer: MEDICAID

## 2024-11-05 DIAGNOSIS — R60.0 BILATERAL LEG EDEMA: Chronic | ICD-10-CM

## 2024-11-05 NOTE — PATIENT INSTRUCTIONS
Influenza (Adult)    Influenza is also called the flu. It is a viral illness that affects the air passages of your lungs. It is different from the common cold. The flu can easily be passed from one to person to another. It may be spread through the air by coughing and sneezing. Or it can be spread by touching the sick person and then touching your own eyes, nose, or mouth.  The flu starts 1 to 3 days after you are exposed to the flu virus. It may last for 1 to 2 weeks but many people feel tired or fatigued for many weeks afterward. You usually dont need to take antibiotics unless you have a complication. This might be an ear or sinus infection or pneumonia.  Symptoms of the flu may be mild or severe. They can include extreme tiredness (wanting to stay in bed all day), chills, fevers, muscle aches, soreness with eye movement, headache, and a dry, hacking cough.  Home care  Follow these guidelines when caring for yourself at home:  · Avoid being around cigarette smoke, whether yours or other peoples.  · Acetaminophen or ibuprofen will help ease your fever, muscle aches, and headache. Dont give aspirin to anyone younger than 18 who has the flu. Aspirin can harm the liver.  · Nausea and loss of appetite are common with the flu. Eat light meals. Drink 6 to 8 glasses of liquids every day. Good choices are water, sport drinks, soft drinks without caffeine, juices, tea, and soup. Extra fluids will also help loosen secretions in your nose and lungs.  · Over-the-counter cold medicines will not make the flu go away faster. But the medicines may help with coughing, sore throat, and congestion in your nose and sinuses. Dont use a decongestant if you have high blood pressure.  · Stay home until your fever has been gone for at least 24 hours without using medicine to reduce fever.  Follow-up care  Follow up with your healthcare provider, or as advised, if you are not getting better over the next week.  If you are age 65 or  older, talk with your provider about getting a pneumococcal vaccine every 5 years. You should also get this vaccine if you have chronic asthma or COPD. All adults should get a flu vaccine every fall. Ask your provider about this.  When to seek medical advice  Call your healthcare provider right away if any of these occur:  · Cough with lots of colored mucus (sputum) or blood in your mucus  · Chest pain, shortness of breath, wheezing, or trouble breathing  · Severe headache, or face, neck, or ear pain  · New rash with fever  · Fever of 100.4°F (38°C) or higher, or as directed by your healthcare provider  · Confusion, behavior change, or seizure  · Severe weakness or dizziness  · You get a new fever or cough after getting better for a few days  Date Last Reviewed: 1/1/2017  © 9797-4118 [employer]. [address] [place] [address], [place] [address]. All rights reserved. This information is not intended as a substitute for professional medical care. Always follow your healthcare professional's instructions.    Acetaminophen or ibuprofen will help ease your fever, muscle aches, and headache. Dont give aspirin to anyone younger than 18 who has the flu. Aspirin can harm the liver.  Nausea and loss of appetite are common with the flu. Eat light meals. Drink 6 to 8 glasses of liquids every day. Good choices are water, sport drinks, soft drinks without caffeine, juices, tea, and soup. Extra fluids will also help loosen secretions in your nose and lungs.  Over-the-counter cold medicines will not make the flu go away faster. But the medicines may help with coughing, sore throat, and congestion in your nose and sinuses. Dont use a decongestant if you have high blood pressure.  Stay home until your fever has been gone for at least 24 hours without using medicine to reduce fever.  If symptoms worsen or do not improve, follow up with PCP.   For severe symptoms (fever unresolved by OTC meds, chest pain, difficulty breathing,  dehydration of intractable nausea/vomiting), go to the ER.      05-Nov-2024 09:44

## 2024-11-06 NOTE — TELEPHONE ENCOUNTER
Care Due:                  Date            Visit Type   Department     Provider  --------------------------------------------------------------------------------                                ESTABLISHED                              PATIENT -    [facility] INTER[facility]  Last Visit: 09-      [facility]       Paco Munoz  Next Visit: None Scheduled  None         None Found                                                            Last  Test          Frequency    Reason                     Performed    Due Date  --------------------------------------------------------------------------------    Lipid Panel.  12 months..  atorvastatin.............  04- 04-    Vitamin D...  12 months..  ergocalciferol...........  10-   09-    [employer] Embedded Care Due Messages. Reference number: [id number].   11/05/2024 8:06:15 PM CST

## 2024-11-11 ENCOUNTER — TELEPHONE (OUTPATIENT)
Dept: PAIN MEDICINE | Facility: CLINIC | Age: 59
End: 2024-11-11
Payer: MEDICAID

## 2024-11-11 DIAGNOSIS — R60.0 BILATERAL LEG EDEMA: Chronic | ICD-10-CM

## 2024-11-11 RX ORDER — FUROSEMIDE 40 MG/1
40 TABLET ORAL DAILY
Qty: 90 TABLET | Refills: 3 | Status: SHIPPED | OUTPATIENT
Start: 2024-11-11 | End: 2024-11-12 | Stop reason: SDUPTHER

## 2024-11-11 NOTE — TELEPHONE ENCOUNTER
Called patient in from appt requests but patient didn't answer LVM to call back at earliest convenience.    Rut CHARLES

## 2024-11-12 ENCOUNTER — OFFICE VISIT (OUTPATIENT)
Dept: CARDIOLOGY | Facility: CLINIC | Age: 59
End: 2024-11-12
Payer: MEDICAID

## 2024-11-12 ENCOUNTER — TELEPHONE (OUTPATIENT)
Dept: PAIN MEDICINE | Facility: CLINIC | Age: 59
End: 2024-11-12
Payer: MEDICAID

## 2024-11-12 ENCOUNTER — PATIENT MESSAGE (OUTPATIENT)
Dept: INTERNAL MEDICINE | Facility: CLINIC | Age: 59
End: 2024-11-12
Payer: MEDICAID

## 2024-11-12 VITALS
WEIGHT: 238.75 LBS | HEART RATE: 88 BPM | SYSTOLIC BLOOD PRESSURE: 138 MMHG | BODY MASS INDEX: 42.3 KG/M2 | OXYGEN SATURATION: 97 % | HEIGHT: 63 IN | DIASTOLIC BLOOD PRESSURE: 82 MMHG

## 2024-11-12 DIAGNOSIS — E66.812 CLASS 2 SEVERE OBESITY DUE TO EXCESS CALORIES WITH SERIOUS COMORBIDITY AND BODY MASS INDEX (BMI) OF 36.0 TO 36.9 IN ADULT: ICD-10-CM

## 2024-11-12 DIAGNOSIS — Z94.4 LIVER REPLACED BY TRANSPLANT: ICD-10-CM

## 2024-11-12 DIAGNOSIS — G89.4 CHRONIC PAIN SYNDROME: ICD-10-CM

## 2024-11-12 DIAGNOSIS — E78.2 MIXED HYPERLIPIDEMIA: ICD-10-CM

## 2024-11-12 DIAGNOSIS — R60.0 BILATERAL LEG EDEMA: ICD-10-CM

## 2024-11-12 DIAGNOSIS — R06.02 SOB (SHORTNESS OF BREATH) ON EXERTION: ICD-10-CM

## 2024-11-12 DIAGNOSIS — E66.01 CLASS 2 SEVERE OBESITY DUE TO EXCESS CALORIES WITH SERIOUS COMORBIDITY AND BODY MASS INDEX (BMI) OF 36.0 TO 36.9 IN ADULT: ICD-10-CM

## 2024-11-12 DIAGNOSIS — G47.33 OSA ON CPAP: ICD-10-CM

## 2024-11-12 DIAGNOSIS — Z86.16 HISTORY OF COVID-19: ICD-10-CM

## 2024-11-12 DIAGNOSIS — M47.27 LUMBOSACRAL RADICULOPATHY DUE TO DEGENERATIVE JOINT DISEASE OF SPINE: ICD-10-CM

## 2024-11-12 DIAGNOSIS — R07.9 CHEST PAIN, UNSPECIFIED TYPE: Primary | ICD-10-CM

## 2024-11-12 DIAGNOSIS — E55.9 VITAMIN D DEFICIENCY: ICD-10-CM

## 2024-11-12 DIAGNOSIS — D50.9 IRON DEFICIENCY ANEMIA, UNSPECIFIED IRON DEFICIENCY ANEMIA TYPE: ICD-10-CM

## 2024-11-12 DIAGNOSIS — R94.31 NONSPECIFIC ABNORMAL ELECTROCARDIOGRAM (ECG) (EKG): ICD-10-CM

## 2024-11-12 PROCEDURE — 1159F MED LIST DOCD IN RCRD: CPT | Mod: CPTII,,, | Performed by: INTERNAL MEDICINE

## 2024-11-12 PROCEDURE — 3008F BODY MASS INDEX DOCD: CPT | Mod: CPTII,,, | Performed by: INTERNAL MEDICINE

## 2024-11-12 PROCEDURE — 99214 OFFICE O/P EST MOD 30 MIN: CPT | Mod: PBBFAC | Performed by: INTERNAL MEDICINE

## 2024-11-12 PROCEDURE — 3075F SYST BP GE 130 - 139MM HG: CPT | Mod: CPTII,,, | Performed by: INTERNAL MEDICINE

## 2024-11-12 PROCEDURE — 1160F RVW MEDS BY RX/DR IN RCRD: CPT | Mod: CPTII,,, | Performed by: INTERNAL MEDICINE

## 2024-11-12 PROCEDURE — 99214 OFFICE O/P EST MOD 30 MIN: CPT | Mod: S$PBB,,, | Performed by: INTERNAL MEDICINE

## 2024-11-12 PROCEDURE — 99999 PR PBB SHADOW E&M-EST. PATIENT-LVL IV: CPT | Mod: PBBFAC,,, | Performed by: INTERNAL MEDICINE

## 2024-11-12 PROCEDURE — 4010F ACE/ARB THERAPY RXD/TAKEN: CPT | Mod: CPTII,,, | Performed by: INTERNAL MEDICINE

## 2024-11-12 PROCEDURE — 3079F DIAST BP 80-89 MM HG: CPT | Mod: CPTII,,, | Performed by: INTERNAL MEDICINE

## 2024-11-12 PROCEDURE — G2211 COMPLEX E/M VISIT ADD ON: HCPCS | Mod: S$PBB,,, | Performed by: INTERNAL MEDICINE

## 2024-11-12 RX ORDER — SPIRONOLACTONE 50 MG/1
50 TABLET, FILM COATED ORAL DAILY
Qty: 90 TABLET | Refills: 1 | Status: SHIPPED | OUTPATIENT
Start: 2024-11-12 | End: 2025-11-12

## 2024-11-12 RX ORDER — FUROSEMIDE 40 MG/1
40 TABLET ORAL DAILY
Qty: 90 TABLET | Refills: 3 | OUTPATIENT
Start: 2024-11-12

## 2024-11-12 RX ORDER — FUROSEMIDE 40 MG/1
40 TABLET ORAL DAILY
Qty: 90 TABLET | Refills: 3 | Status: SHIPPED | OUTPATIENT
Start: 2024-11-12

## 2024-11-12 NOTE — TELEPHONE ENCOUNTER
----- Message from Emilee Buitrago PA-C sent at 11/12/2024 11:17 AM CST -----  Contact: Marcie  Previously referred to Neurosurgery and Orthopedics since injections weren't helping.     We can offer an appointment with any [facility] since I am out a lot in next couple of weeks.  ----- Message -----  From: Josi Roldan  Sent: 11/12/2024  11:14 AM CST  To: Minna BARTHOLOMEW Staff    Type:  Sooner Apoointment Request    Caller is requesting a sooner appointment. Caller will not accept being placed on the waitlist and is requesting a message be sent to doctor.  Name of Caller:Marcie  When is the first available appointment?unknown  Symptoms:right side back pain  Would the patient rather a call back or a response via [facility]? call  Best Call Back Number:[phone]   Additional Information: Please give patient a call back to assist.  Thank you,  [facility]

## 2024-11-12 NOTE — PROGRESS NOTES
Subjective:   Patient ID:  Marcie Bazan is a 59 y.o. female who presents for cardiac consult of No chief complaint on file.      Referral by: No referring provider defined for this encounter.     Reason for consult: CP      HPI  The patient came in today for cardiac consult of No chief complaint on file.      Marcie Bazan is a 59 y.o. female pt with HTN, HLD, h/o liver transplant - '2017, h/o COVID 19, chronic back/knee pain, BRANNON, obesity, FE def anemia presents for CV follow up.     10/15/24  BP and HR stable. BMI 41 - 235 lbs   Pt had prior stress and ECHO in 2020.   She has more CP/SOB - feels like pressure and more fatigue lately.   She has been to urgent care about 4 months ago.   ECG - NSR, low volt QRS, poor RWP    11/12/24  Nuc stress 10/2024 neg for ischemia.   ECHO 10/2024 with normal bi V function, mild TR, PASP 43 mmHg.   She has been taking more diuretics lately has been eating out daily.   She has a [place] trip coming up.   Will add Aldactone.         No cardiac monitor results found for the past 12 months     Results for orders placed during the hospital encounter of 10/31/24    Echo    Interpretation Summary    Left Ventricle: The left ventricle is normal in size. Ventricular mass is normal. Normal wall thickness. There is concentric remodeling. There is normal systolic function with a visually estimated ejection fraction of 55 - 60%. There is normal diastolic function.    Right Ventricle: Normal right ventricular cavity size. Wall thickness is normal. Systolic function is normal.    Tricuspid Valve: There is mild regurgitation.    Pulmonary Artery: The estimated pulmonary artery systolic pressure is 43 mmHg.    IVC/SVC: Normal venous pressure at 3 mmHg.      Results for orders placed during the hospital encounter of 10/31/24    Nuclear Stress - Cardiology Interpreted    Interpretation Summary    Normal myocardial perfusion scan. There is no evidence of myocardial ischemia or infarction. TID is  1.20    The gated perfusion images showed an ejection fraction of 79% at rest. The gated perfusion images showed an ejection fraction of 79% post stress. Normal ejection fraction is greater than 59%.    The ECG portion of the study is negative for ischemia.      Past Medical History:   Diagnosis Date    Alcohol abuse     Alcoholic hepatitis     Anemia of chronic disease     Arthritis     generialized    Cancer 12/26/2017    liver    Coagulopathy     Dyspnea on exertion 3/26/2020    Encounter for blood transfusion     End stage liver disease     History of alcohol abuse     Hyperlipidemia     Hypersomnia 9/11/2020    Hypertension     Hyponatremia     Liver cirrhosis     Liver transplant candidate 12/26/2017    Obesity (BMI 30-39.9) 1/5/2016    Prediabetes 4/22/2023    Sleep apnea        Past Surgical History:   Procedure Laterality Date    BILATERAL SALPINGO-OOPHORECTOMY (BSO) Bilateral 1986    with hysterectomy done for abnormal cells    BREAST BIOPSY Left     benign    BREAST LUMPECTOMY      patient is unsure of date or laterality    CATARACT EXTRACTION W/  INTRAOCULAR LENS IMPLANT Right 9/20/2023    Procedure: EXTRACTION, CATARACT, WITH IOL INSERTION;  Surgeon: Kim Mullen MD;  Location: [facility] OR;  Service: Ophthalmology;  Laterality: Right;  Per Elidia Viveros change patient from left eye to right on 9/11 @ 2:38p    CATARACT EXTRACTION W/  INTRAOCULAR LENS IMPLANT Left 10/11/2023    Procedure: EXTRACTION, CATARACT, WITH IOL INSERTION;  Surgeon: Kim Mullen MD;  Location: [facility] OR;  Service: Ophthalmology;  Laterality: Left;  Per Idalia Rosa change the patient to Left and to 10/11 on 9/8 @8:49Am    CHOLECYSTECTOMY      COLONOSCOPY N/A 12/01/2017    Procedure: COLONOSCOPY;  Surgeon: Filemon Fuentes MD;  Location: [facility] ENDO (2ND FLR);  Service: Endoscopy;  Laterality: N/A;    COLONOSCOPY N/A 12/05/2017    Procedure: COLONOSCOPY;  Surgeon: José Chavira MD;  Location: [facility] KALIN (2ND FLR);  Service:  Endoscopy;  Laterality: N/A;    COLONOSCOPY N/A 12/12/2022    Procedure: COLONOSCOPY;  Surgeon: Gogo Brown MD;  Location: [facility] ENDO;  Service: Endoscopy;  Laterality: N/A;    EPIDURAL STEROID INJECTION N/A 2/28/2023    Procedure: L4/5 IL WILBERTO (with right paramedian approach);  Surgeon: Ras Caballero MD;  Location: [facility] PAIN MGT;  Service: Pain Management;  Laterality: N/A;    EPIDURAL STEROID INJECTION INTO CERVICAL SPINE N/A 01/04/2022    Procedure: C5/6 IL WILBERTO;  Surgeon: Ras Caballero MD;  Location: [facility] PAIN MGT;  Service: Pain Management;  Laterality: N/A;    ESOPHAGOGASTRODUODENOSCOPY N/A 9/14/2023    Procedure: EGD (ESOPHAGOGASTRODUODENOSCOPY);  Surgeon: Angie Zimmerman MD;  Location: [facility] ENDO;  Service: Endoscopy;  Laterality: N/A;    EXAMINATION UNDER ANESTHESIA N/A 02/18/2020    Procedure: EXAM UNDER ANESTHESIA;  Surgeon: Alden Horowitz MD;  Location: [facility] OR;  Service: General;  Laterality: N/A;    EXCISIONAL HEMORRHOIDECTOMY N/A 02/18/2020    Procedure: HEMORRHOIDECTOMY;  Surgeon: Alden Horowitz MD;  Location: [facility] OR;  Service: General;  Laterality: N/A;    HYSTERECTOMY  1986    due to abnormal paps - ovaries were removed    INJECTION OF ANESTHETIC AGENT AROUND PUDENDAL NERVE N/A 02/18/2020    Procedure: BLOCK, NERVE, PUDENDAL;  Surgeon: Alden Horowitz MD;  Location: [facility] OR;  Service: General;  Laterality: N/A;    INJECTION OF ANESTHETIC AGENT INTO SACROILIAC JOINT Right 06/14/2019    Procedure: right Sacroiliac Joint Injection;  Surgeon: David Desai MD;  Location: [facility] PAIN MGT;  Service: Pain Management;  Laterality: Right;    INJECTION OF ANESTHETIC AGENT INTO SACROILIAC JOINT Bilateral 02/07/2020    Procedure: Bilateral GT bursa + Bilateral Sacroiliac Joint Injection;  Surgeon: David Desai MD;  Location: [facility] PAIN MGT;  Service: Pain Management;  Laterality: Bilateral;    INJECTION OF ANESTHETIC AGENT INTO SACROILIAC JOINT Bilateral 07/07/2020    Procedure:  Bilateral GT bursa +SIJ injection;  Surgeon: David Deasi MD;  Location: [facility] PAIN MGT;  Service: Pain Management;  Laterality: Bilateral;    INJECTION OF ANESTHETIC AGENT INTO SACROILIAC JOINT Right 10/21/2020    Procedure: Right piriformis + right SIJ + right GT bursa injection;  Surgeon: David Desai MD;  Location: [facility] PAIN MGT;  Service: Pain Management;  Laterality: Right;    INJECTION OF ANESTHETIC AGENT INTO SACROILIAC JOINT Bilateral 04/15/2021    Procedure: Bilateral SIJ + Bilateral Piriformis + Bilateral GT Bursa Injection;  Surgeon: Flaco Frazier MD;  Location: [facility] PAIN MGT;  Service: Pain Management;  Laterality: Bilateral;    INJECTION OF ANESTHETIC AGENT INTO SACROILIAC JOINT Bilateral 04/21/2022    Procedure: Bilateral SIJ + Bilateral Piriformis + Bilateral GT Bursa Injection;  Surgeon: Ras Caballero MD;  Location: [facility] PAIN MGT;  Service: Pain Management;  Laterality: Bilateral;    INJECTION OF ANESTHETIC AGENT INTO SACROILIAC JOINT Right 4/18/2023    Procedure: Right SIJ + Right piriformis +/- Right GT bursa injection;  Surgeon: Ras Caballero MD;  Location: [facility] PAIN MGT;  Service: Pain Management;  Laterality: Right;    INJECTION OF JOINT Bilateral 02/07/2020    Procedure: Bilateral GT bursa + Bilateral Sacroiliac Joint Injection;  Surgeon: David Desai MD;  Location: [facility] PAIN MGT;  Service: Pain Management;  Laterality: Bilateral;    INJECTION OF JOINT Bilateral 07/07/2020    Procedure: Bilateral GT bursa +SIJ injection;  Surgeon: David Desai MD;  Location: [facility] PAIN MGT;  Service: Pain Management;  Laterality: Bilateral;    INJECTION OF JOINT Right 10/21/2020    Procedure: Right piriformis + right SIJ + right GT bursa injection;  Surgeon: David Desai MD;  Location: [facility] PAIN MGT;  Service: Pain Management;  Laterality: Right;    INJECTION OF JOINT Bilateral 02/08/2021    Procedure: Bilateral GT Bursa Injection;  Surgeon: Anna Kaminski MD;  Location: [facility] PAIN  MGT;  Service: Pain Management;  Laterality: Bilateral;    INJECTION OF JOINT Bilateral 04/15/2021    Procedure: Bilateral SIJ + Bilateral Piriformis + Bilateral GT Bursa Injection;  Surgeon: Flaco Frazier MD;  Location: [facility] PAIN MGT;  Service: Pain Management;  Laterality: Bilateral;    INJECTION OF JOINT Bilateral 04/21/2022    Procedure: Bilateral SIJ + Bilateral Piriformis + Bilateral GT Bursa Injection;  Surgeon: Ras Caballero MD;  Location: [facility] PAIN MGT;  Service: Pain Management;  Laterality: Bilateral;    INJECTION OF JOINT Right 10/11/2022    Procedure: right SIJ + right piriformis + right GT bursa injection;  Surgeon: Ras Caballero MD;  Location: [facility] PAIN MGT;  Service: Pain Management;  Laterality: Right;    INJECTION OF PIRIFORMIS MUSCLE Right 06/14/2019    Procedure: Right piriformis injection;  Surgeon: David Desai MD;  Location: [facility] PAIN MGT;  Service: Pain Management;  Laterality: Right;    INJECTION OF PIRIFORMIS MUSCLE Right 10/21/2020    Procedure: Right piriformis + right SIJ + right GT bursa injection;  Surgeon: David Desai MD;  Location: [facility] PAIN MGT;  Service: Pain Management;  Laterality: Right;    INJECTION OF PIRIFORMIS MUSCLE Bilateral 04/15/2021    Procedure: Bilateral SIJ + Bilateral Piriformis + Bilateral GT Bursa Injection;  Surgeon: Flaco Frazier MD;  Location: [facility] PAIN MGT;  Service: Pain Management;  Laterality: Bilateral;    INJECTION OF PIRIFORMIS MUSCLE Bilateral 04/21/2022    Procedure: Bilateral SIJ + Bilateral Piriformis + Bilateral GT Bursa Injection;  Surgeon: Ras Caballero MD;  Location: [facility] PAIN MGT;  Service: Pain Management;  Laterality: Bilateral;    LIVER TRANSPLANT  12/2017    SELECTIVE INJECTION OF ANESTHETIC AGENT AROUND LUMBAR SPINAL NERVE ROOT BY TRANSFORAMINAL APPROACH Bilateral 10/28/2021    Procedure: BLOCK, SPINAL NERVE ROOT, LUMBAR, SELECTIVE, TRANSFORAMINAL APPROACH bilateral L4-5 and Right Knee injection with RN IV sedation;   Surgeon: Flaco Frazier MD;  Location: [facility] PAIN MGT;  Service: Pain Management;  Laterality: Bilateral;    SELECTIVE INJECTION OF ANESTHETIC AGENT AROUND LUMBAR SPINAL NERVE ROOT BY TRANSFORAMINAL APPROACH Bilateral 08/18/2022    Procedure: Bilateral L4/5 TF WILBERTO;  Surgeon: Ras Caballero MD;  Location: [facility] PAIN MGT;  Service: Pain Management;  Laterality: Bilateral;    TRANSFORAMINAL EPIDURAL INJECTION OF STEROID Right 8/15/2023    Procedure: right L5/S1 + S1 TF WILBERTO;  Surgeon: Ras Caballero MD;  Location: [facility] PAIN MGT;  Service: Pain Management;  Laterality: Right;       Social History     Tobacco Use    Smoking status: Never    Smokeless tobacco: Never   Substance Use Topics    Alcohol use: No     Comment: Currently admitted to inpatient rehab 7/2017    Drug use: No       Family History   Problem Relation Name Age of Onset    Hypertension Mother      Alzheimer's disease Mother      Hypertension Father      Diabetes Father      Diabetes Sister      Ovarian cancer Paternal Aunt      Depression Maternal Grandfather         Patient's Medications   New Prescriptions    No medications on file   Previous Medications    AMITRIPTYLINE (ELAVIL) 25 MG TABLET    TAKE 1 TO 2 TABLETS(25 TO 50 MG) BY MOUTH EVERY NIGHT AS NEEDED FOR INSOMNIA OR PAIN    ATORVASTATIN (LIPITOR) 40 MG TABLET    Take 1 tablet (40 mg total) by mouth every evening.    COLLAGEN-BIOTIN-ASCORBIC ACID ORAL    Take by mouth.    CYCLOBENZAPRINE (FLEXERIL) 10 MG TABLET    TAKE 1/2 TO 1 TABLET BY MOUTH THREE TIMES DAILY AS NEEDED FOR MUSCLE SPASMS    DICLOFENAC SODIUM (VOLTAREN) 1 % GEL    Apply to painful joint area four times a day as needed    DULOXETINE (CYMBALTA) 60 MG CAPSULE    Take 1 capsule (60 mg total) by mouth once daily.    ERGOCALCIFEROL (ERGOCALCIFEROL) 50,000 UNIT CAP    Take 1 capsule (50,000 Units total) by mouth every 7 days.    FAMOTIDINE (PEPCID) 20 MG TABLET    TAKE 1 TABLET(20 MG) BY MOUTH TWICE DAILY.    FERROUS SULFATE (FEOSOL)  325 MG (65 MG IRON) TAB TABLET    Take 1 tablet (325 mg total) by mouth daily with breakfast.    FLUTICASONE PROPIONATE (FLONASE) 50 MCG/ACTUATION NASAL SPRAY    2 sprays (100 mcg total) by Each Nostril route once daily.    FOLIC ACID (FOLVITE) 1 MG TABLET    Take 1 tablet (1 mg total) by mouth once daily.    FUROSEMIDE (LASIX) 40 MG TABLET    Take 1 tablet (40 mg total) by mouth once daily.    LEVOCETIRIZINE (XYZAL) 5 MG TABLET    Take 1 tablet (5 mg total) by mouth every evening.    MAGNESIUM OXIDE (MAG-OX) 400 MG (241.3 MG MAGNESIUM) TABLET    Take 1 tablet (400 mg total) by mouth once daily.    NIFEDIPINE (PROCARDIA-XL) 60 MG (OSM) 24 HR TABLET    Take 1 tablet (60 mg total) by mouth once daily.    NITROGLYCERIN (NITROSTAT) 0.4 MG SL TABLET    Place 1 tablet (0.4 mg total) under the tongue every 5 (five) minutes as needed for Chest pain.    PANTOPRAZOLE (PROTONIX) 40 MG TABLET    Take 1 tablet (40 mg total) by mouth 2 (two) times daily.    POTASSIUM CHLORIDE (KLOR-CON) 10 MEQ TBSR    Take 1 tablet (10 mEq total) by mouth 2 (two) times daily.    PREGABALIN (LYRICA) 225 MG CAP    TAKE 1 CAPSULE BY MOUTH TWICE DAILY    TACROLIMUS (PROGRAF) 1 MG CAP    Take 2 capsules (2 mg total) by mouth every morning AND 1 capsule (1 mg total) every evening.    VALSARTAN (DIOVAN) 80 MG TABLET    Take 1 tablet (80 mg total) by mouth once daily. Stop losartan   Modified Medications    No medications on file   Discontinued Medications    No medications on file       Review of Systems   Constitutional: Negative.    HENT: Negative.     Eyes: Negative.    Respiratory: Negative.     Cardiovascular:  Positive for chest pain.   Gastrointestinal: Negative.    Genitourinary: Negative.    Musculoskeletal: Negative.    Skin: Negative.    Neurological: Negative.    Endo/Heme/Allergies: Negative.    Psychiatric/Behavioral: Negative.     All 12 systems otherwise negative.      Wt Readings from Last 3 Encounters:   10/31/24 106.6 kg (235 lb)    10/31/24 106.6 kg (235 lb)   10/15/24 107 kg (235 lb 12.9 oz)     Temp Readings from Last 3 Encounters:   08/26/24 98.1 °F (36.7 °C) (Tympanic)   08/25/24 98.5 °F (36.9 °C) (Oral)   06/25/24 97 °F (36.1 °C) (Tympanic)     BP Readings from Last 3 Encounters:   10/31/24 122/86   10/31/24 (!) 152/87   10/15/24 122/86     Pulse Readings from Last 3 Encounters:   10/31/24 75   10/15/24 84   08/26/24 90       LMP 01/01/1985 (Approximate) Comment: 1985    Objective:   Physical Exam  Vitals and nursing note reviewed.   Constitutional:       General: She is not in acute distress.     Appearance: She is well-developed. She is obese. She is not diaphoretic.   HENT:      Head: Normocephalic and atraumatic.      Nose: Nose normal.   Eyes:      General: No scleral icterus.     Conjunctiva/sclera: Conjunctivae normal.   Neck:      Thyroid: No thyromegaly.      Vascular: No JVD.   Cardiovascular:      Rate and Rhythm: Normal rate and regular rhythm.      Heart sounds: S1 normal and S2 normal. Murmur heard.      No friction rub. No gallop. No S3 or S4 sounds.   Pulmonary:      Effort: Pulmonary effort is normal. No respiratory distress.      Breath sounds: Normal breath sounds. No stridor. No wheezing or rales.   Chest:      Chest wall: No tenderness.   Abdominal:      General: Bowel sounds are normal. There is no distension.      Palpations: Abdomen is soft. There is no mass.      Tenderness: There is no abdominal tenderness. There is no rebound.   Genitourinary:     Comments: Deferred  Musculoskeletal:         General: No tenderness or deformity. Normal range of motion.      Cervical back: Normal range of motion and neck supple.   Lymphadenopathy:      Cervical: No cervical adenopathy.   Skin:     General: Skin is warm and dry.      Coloration: Skin is not pale.      Findings: No erythema or rash.   Neurological:      Mental Status: She is alert and oriented to person, place, and time.      Motor: No abnormal muscle tone.       Coordination: Coordination normal.   Psychiatric:         Behavior: Behavior normal.         Thought Content: Thought content normal.         Judgment: Judgment normal.       Lab Results   Component Value Date     09/09/2024    K 3.7 09/09/2024     09/09/2024    CO2 25 09/09/2024    BUN 13 09/09/2024    CREATININE 1.0 09/09/2024    GLU 90 09/09/2024    HGBA1C 5.7 (H) 10/06/2023    MG 1.4 (L) 06/13/2024    AST 10 09/09/2024    ALT 10 09/09/2024    ALBUMIN 3.2 (L) 09/09/2024    PROT 6.9 09/09/2024    BILITOT 0.4 09/09/2024    WBC 12.59 09/09/2024    HGB 12.5 09/09/2024    HCT 40.5 09/09/2024    HCT 28 (L) 12/26/2017    MCV 82 09/09/2024    PLT 96 (L) 09/09/2024    INR 1.0 02/27/2020    TSH 1.157 01/31/2024    CHOL 127 04/17/2023    HDL 40 04/17/2023    LDLCALC 66.2 04/17/2023    TRIG 104 04/17/2023    BNP 11 06/25/2024         BNP (pg/mL)   Date Value   06/25/2024 11   07/29/2021 <10   02/27/2020 <10   03/09/2018 117 (H)   02/12/2018 15     INR (no units)   Date Value   02/27/2020 1.0   03/09/2018 1.0   03/09/2018 1.0   02/27/2018 1.1          Assessment:      1. Chest pain, unspecified type    2. Nonspecific abnormal electrocardiogram (ECG) (EKG)    3. Bilateral leg edema    4. SOB (shortness of breath) on exertion    5. Mixed hyperlipidemia    6. Liver replaced by transplant    7. Vitamin D deficiency    8. History of COVID-19    9. Iron deficiency anemia, unspecified iron deficiency anemia type    10. Class 2 severe obesity due to excess calories with serious comorbidity and body mass index (BMI) of 36.0 to 36.9 in adult    11. BRANNON on CPAP    12. Chronic pain syndrome    13. Lumbar radiculopathy due to degenerative joint disease of spine        Plan:     Chest pain, SOB - ongoing dyspnea   - has been having more pressure/JADE and fluid - add Aldactone 50mg   -Nuc stress 10/2024 neg for ischemia.   -ECHO 10/2024 with normal bi V function, mild TR, PASP 43 mmHg.   - iron levels low - cont iron tabs  -  refer to pulm    2. HLD  - cont statin    3. H/O Liver transplant  - cont f/u with transplant team    4. Chronic pain, lumbar radic  - cont pain tx    5. ACD, Fe def, Vit D def  - cont supplements and monitor  - had h/o Iron infusions     6. BRANNON, seasonal allergies, h/o COVID 19   -cont CPAP, f/u pulm as needed    7. Obesity BMI 41 - 235 lbs  --> BMI 42 - 238 lbs   - cont weight loss    Visit today included increased complexity associated with the care of the episodic problem chest pain addressed and managing the longitudinal care of the patient due to the serious and/or complex managed problem(s) .      Thank you for allowing me to participate in this patient's care. Please do not hesitate to contact me with any questions or concerns. Consult note has been forwarded to the referral physician.

## 2024-11-12 NOTE — TELEPHONE ENCOUNTER
Return call to patient and informed her that I can schedule her with Rayna Chappell PA-C to discuss pain in backa nd ion the right side down to her foot.  Also sent a message to clear it with Rayna Chappell PA-C

## 2024-11-26 ENCOUNTER — PATIENT MESSAGE (OUTPATIENT)
Dept: TRANSPLANT | Facility: CLINIC | Age: 59
End: 2024-11-26
Payer: MEDICAID

## 2024-12-05 ENCOUNTER — TELEPHONE (OUTPATIENT)
Dept: INTERNAL MEDICINE | Facility: CLINIC | Age: 59
End: 2024-12-05
Payer: MEDICAID

## 2024-12-05 NOTE — TELEPHONE ENCOUNTER
----- Message from [employer] sent at 12/5/2024  3:17 PM CST -----  Contact: self [phone]  Type:  Needs Mammo orders    Who Called: Marcie   Symptoms (please be specific): mammo order    Would the patient rather a call back or a response via [employer]? Call back   Best Call Back Number: [phone]  Additional Information:

## 2024-12-12 NOTE — DISCHARGE SUMMARY
Discharge Note  Short Stay      SUMMARY     Admit Date: 4/21/2022    Attending Physician: Ras Caballero MD        Discharge Physician: Ras Caballero MD        Discharge Date: 4/21/2022 8:24 AM    Procedure(s) (LRB):  Bilateral SIJ + Bilateral Piriformis + Bilateral GT Bursa Injection (Bilateral)  Bilateral SIJ + Bilateral Piriformis + Bilateral GT Bursa Injection (Bilateral)  Bilateral SIJ + Bilateral Piriformis + Bilateral GT Bursa Injection (Bilateral)    Final Diagnosis: Sacroiliitis [M46.1]  Greater trochanteric bursitis of both hips [M70.61, M70.62]  Piriformis muscle pain [M79.18]    Disposition: Home or self care    Patient Instructions:   Current Discharge Medication List      CONTINUE these medications which have NOT CHANGED    Details   amitriptyline (ELAVIL) 10 MG tablet TAKE 1 TABLET(10 MG) BY MOUTH EVERY NIGHT AS NEEDED FOR INSOMNIA OR PAIN  Qty: 30 tablet, Refills: 5    Associated Diagnoses: Insomnia secondary to chronic pain      atorvastatin (LIPITOR) 40 MG tablet TAKE 1 TABLET(40 MG) BY MOUTH EVERY EVENING  Qty: 90 tablet, Refills: 3    Associated Diagnoses: Mixed hyperlipidemia      chlorthalidone (HYGROTEN) 25 MG Tab       cyclobenzaprine (FLEXERIL) 10 MG tablet TAKE 1/2 TO 1 TABLET BY MOUTH THREE TIMES DAILY AS NEEDED FOR MUSCLE SPASMS  Qty: 90 tablet, Refills: 5    Associated Diagnoses: Piriformis muscle pain      ergocalciferol (ERGOCALCIFEROL) 50,000 unit Cap TAKE 1 CAPSULE BY MOUTH EVERY 7 DAYS  Qty: 12 capsule, Refills: 2    Associated Diagnoses: Vitamin D deficiency      famotidine (PEPCID) 20 MG tablet Take 1 tablet (20 mg total) by mouth 2 (two) times a day.  Qty: 90 tablet, Refills: 3    Comments: DISCONTINUE any prescription for this drug issued prior to 12/13/2021.  Associated Diagnoses: Gastroesophageal reflux disease with esophagitis without hemorrhage      furosemide (LASIX) 40 MG tablet Take 1 tablet (40 mg total) by mouth once daily.  Qty: 30 tablet, Refills: 1     Associated Diagnoses: Prophylactic immunotherapy; Bilateral leg edema      gabapentin (NEURONTIN) 300 MG capsule Take 1 capsule (300 mg total) by mouth 3 (three) times daily. With 600mg cap to equal 900mg TID  Qty: 120 capsule, Refills: 5    Associated Diagnoses: Bilateral lumbar radiculopathy; Neuropathy      gabapentin (NEURONTIN) 600 MG tablet Take 1 tablet (600 mg total) by mouth 3 (three) times daily. With 300mg cap to equal 900mg TID  Qty: 90 tablet, Refills: 5    Associated Diagnoses: Bilateral lumbar radiculopathy      losartan (COZAAR) 25 MG tablet TAKE 1 TABLET(25 MG) BY MOUTH EVERY DAY  Qty: 30 tablet, Refills: 11    Associated Diagnoses: Essential hypertension      magnesium oxide (MAG-OX) 400 mg (241.3 mg magnesium) tablet Take 1 tablet (400 mg total) by mouth 2 (two) times daily.  Qty: 60 tablet, Refills: 2    Associated Diagnoses: Hypomagnesemia      NIFEdipine (PROCARDIA-XL) 60 MG (OSM) 24 hr tablet TAKE 1 TABLET(60 MG) BY MOUTH EVERY EVENING  Qty: 90 tablet, Refills: 0    Associated Diagnoses: Essential hypertension      pantoprazole (PROTONIX) 40 MG tablet Take 1 tablet (40 mg total) by mouth once daily.  Qty: 90 tablet, Refills: 3    Comments: DISCONTINUE any prescription for this drug issued prior to 12/13/2021.  Associated Diagnoses: Gastroesophageal reflux disease with esophagitis without hemorrhage      potassium chloride (KLOR-CON) 10 MEQ TbSR Take 1 tablet (10 mEq total) by mouth 2 (two) times daily.  Qty: 180 tablet, Refills: 2      tacrolimus (PROGRAF) 1 MG Cap Take 2 capsules (2 mg total) by mouth every morning AND 1 capsule (1 mg total) every evening.  Qty: 90 capsule, Refills: 11    Comments: Txp Date:12/26/2017 (Liver) Disch Date:1/5/2018 ICD10:Z94.4 Txp Location:[facility]  Associated Diagnoses: Liver replaced by transplant      diclofenac sodium (VOLTAREN) 1 % Gel Apply 2 g topically 2 (two) times daily.  Qty: 100 g, Refills: 0      !! varicella-zoster gE-AS01B, PF, (SHINGRIX) 50 mcg/0.5  mL injection Inject 0.5 mL (one dose) into muscle now; give second dose at least 2 months later  Qty: 1 each, Refills: 1    Associated Diagnoses: Need for shingles vaccine      !! varicella-zoster gE-AS01B, PF, (SHINGRIX, PF,) 50 mcg/0.5 mL injection Inject into the muscle as directed  Qty: 1 each, Refills: 0       !! - Potential duplicate medications found. Please discuss with provider.              Discharge Diagnosis: Sacroiliitis [M46.1]  Greater trochanteric bursitis of both hips [M70.61, M70.62]  Piriformis muscle pain [M79.18]  Condition on Discharge: Stable with no complications to procedure   Diet on Discharge: Same as before.  Activity: as per instruction sheet.  Discharge to: Home with a responsible adult.  Follow up: 2-4 weeks       Please call the office at [phone] if you experience any weakness or loss of sensation, fever > 101.5, pain uncontrolled with oral medications, persistent nausea/vomiting/or diarrhea, redness or drainage from the incisions, or any other worrisome concerns. If physician on call was not reached or could not communicate with our office for any reason please go to the nearest emergency department       English

## 2024-12-31 ENCOUNTER — OFFICE VISIT (OUTPATIENT)
Dept: CARDIOLOGY | Facility: CLINIC | Age: 59
End: 2024-12-31
Payer: MEDICAID

## 2024-12-31 ENCOUNTER — PATIENT MESSAGE (OUTPATIENT)
Dept: HEMATOLOGY/ONCOLOGY | Facility: CLINIC | Age: 59
End: 2024-12-31
Payer: MEDICAID

## 2024-12-31 VITALS
SYSTOLIC BLOOD PRESSURE: 124 MMHG | WEIGHT: 234.13 LBS | OXYGEN SATURATION: 99 % | BODY MASS INDEX: 41.47 KG/M2 | DIASTOLIC BLOOD PRESSURE: 80 MMHG | HEART RATE: 88 BPM

## 2024-12-31 DIAGNOSIS — E66.01 CLASS 2 SEVERE OBESITY DUE TO EXCESS CALORIES WITH SERIOUS COMORBIDITY AND BODY MASS INDEX (BMI) OF 36.0 TO 36.9 IN ADULT: ICD-10-CM

## 2024-12-31 DIAGNOSIS — D63.8 ANEMIA OF CHRONIC DISEASE: ICD-10-CM

## 2024-12-31 DIAGNOSIS — R60.0 BILATERAL LEG EDEMA: ICD-10-CM

## 2024-12-31 DIAGNOSIS — D50.9 IRON DEFICIENCY ANEMIA, UNSPECIFIED IRON DEFICIENCY ANEMIA TYPE: ICD-10-CM

## 2024-12-31 DIAGNOSIS — M47.27 LUMBOSACRAL RADICULOPATHY DUE TO DEGENERATIVE JOINT DISEASE OF SPINE: ICD-10-CM

## 2024-12-31 DIAGNOSIS — R06.02 SOB (SHORTNESS OF BREATH) ON EXERTION: ICD-10-CM

## 2024-12-31 DIAGNOSIS — Z86.16 HISTORY OF COVID-19: ICD-10-CM

## 2024-12-31 DIAGNOSIS — R07.9 CHEST PAIN, UNSPECIFIED TYPE: Primary | ICD-10-CM

## 2024-12-31 DIAGNOSIS — E78.2 MIXED HYPERLIPIDEMIA: ICD-10-CM

## 2024-12-31 DIAGNOSIS — G47.33 OSA ON CPAP: ICD-10-CM

## 2024-12-31 DIAGNOSIS — R94.31 NONSPECIFIC ABNORMAL ELECTROCARDIOGRAM (ECG) (EKG): ICD-10-CM

## 2024-12-31 DIAGNOSIS — E55.9 VITAMIN D DEFICIENCY: ICD-10-CM

## 2024-12-31 DIAGNOSIS — E66.812 CLASS 2 SEVERE OBESITY DUE TO EXCESS CALORIES WITH SERIOUS COMORBIDITY AND BODY MASS INDEX (BMI) OF 36.0 TO 36.9 IN ADULT: ICD-10-CM

## 2024-12-31 DIAGNOSIS — Z94.4 LIVER REPLACED BY TRANSPLANT: ICD-10-CM

## 2024-12-31 PROCEDURE — 3008F BODY MASS INDEX DOCD: CPT | Mod: CPTII,,, | Performed by: INTERNAL MEDICINE

## 2024-12-31 PROCEDURE — 99214 OFFICE O/P EST MOD 30 MIN: CPT | Mod: S$PBB,,, | Performed by: INTERNAL MEDICINE

## 2024-12-31 PROCEDURE — 1160F RVW MEDS BY RX/DR IN RCRD: CPT | Mod: CPTII,,, | Performed by: INTERNAL MEDICINE

## 2024-12-31 PROCEDURE — 99999 PR PBB SHADOW E&M-EST. PATIENT-LVL IV: CPT | Mod: PBBFAC,,, | Performed by: INTERNAL MEDICINE

## 2024-12-31 PROCEDURE — 1159F MED LIST DOCD IN RCRD: CPT | Mod: CPTII,,, | Performed by: INTERNAL MEDICINE

## 2024-12-31 PROCEDURE — 3074F SYST BP LT 130 MM HG: CPT | Mod: CPTII,,, | Performed by: INTERNAL MEDICINE

## 2024-12-31 PROCEDURE — 3079F DIAST BP 80-89 MM HG: CPT | Mod: CPTII,,, | Performed by: INTERNAL MEDICINE

## 2024-12-31 PROCEDURE — G2211 COMPLEX E/M VISIT ADD ON: HCPCS | Mod: S$PBB,,, | Performed by: INTERNAL MEDICINE

## 2024-12-31 PROCEDURE — 99214 OFFICE O/P EST MOD 30 MIN: CPT | Mod: PBBFAC | Performed by: INTERNAL MEDICINE

## 2024-12-31 PROCEDURE — 4010F ACE/ARB THERAPY RXD/TAKEN: CPT | Mod: CPTII,,, | Performed by: INTERNAL MEDICINE

## 2024-12-31 RX ORDER — FERROUS SULFATE 325(65) MG
325 TABLET ORAL
Qty: 90 TABLET | Refills: 1 | Status: SHIPPED | OUTPATIENT
Start: 2024-12-31

## 2024-12-31 RX ORDER — NITROGLYCERIN 0.4 MG/1
0.4 TABLET SUBLINGUAL EVERY 5 MIN PRN
Qty: 30 TABLET | Refills: 0 | Status: SHIPPED | OUTPATIENT
Start: 2024-12-31 | End: 2025-12-31

## 2024-12-31 NOTE — PROGRESS NOTES
Subjective:   Patient ID:  Marcie Bazan is a 59 y.o. female who presents for cardiac consult of No chief complaint on file.      Referral by: No referring provider defined for this encounter.     Reason for consult: CP      HPI  The patient came in today for cardiac consult of No chief complaint on file.      Marcie Bazan is a 59 y.o. female pt with HTN, HLD, h/o liver transplant - '2017, h/o COVID 19, chronic back/knee pain, BRANNON, obesity, FE def anemia presents for CV follow up.     10/15/24  BP and HR stable. BMI 41 - 235 lbs   Pt had prior stress and ECHO in 2020.   She has more CP/SOB - feels like pressure and more fatigue lately.   She has been to urgent care about 4 months ago.   ECG - NSR, low volt QRS, poor RWP    11/12/24  Nuc stress 10/2024 neg for ischemia.   ECHO 10/2024 with normal bi V function, mild TR, PASP 43 mmHg.   She has been taking more diuretics lately has been eating out daily.   She has a [place] trip coming up.   Will add Aldactone.     12/31/24  BP and HR stable. BMI 41 - 234 lbs   She has a lot more JADE lately, iron levels very last visit- will repeat labs and refer to heme/onc.   Used NTG twice for CP.     No cardiac monitor results found for the past 12 months     Results for orders placed during the hospital encounter of 10/31/24    Echo    Interpretation Summary    Left Ventricle: The left ventricle is normal in size. Ventricular mass is normal. Normal wall thickness. There is concentric remodeling. There is normal systolic function with a visually estimated ejection fraction of 55 - 60%. There is normal diastolic function.    Right Ventricle: Normal right ventricular cavity size. Wall thickness is normal. Systolic function is normal.    Tricuspid Valve: There is mild regurgitation.    Pulmonary Artery: The estimated pulmonary artery systolic pressure is 43 mmHg.    IVC/SVC: Normal venous pressure at 3 mmHg.      Results for orders placed during the hospital encounter of  10/31/24    Nuclear Stress - Cardiology Interpreted    Interpretation Summary    Normal myocardial perfusion scan. There is no evidence of myocardial ischemia or infarction. TID is 1.20    The gated perfusion images showed an ejection fraction of 79% at rest. The gated perfusion images showed an ejection fraction of 79% post stress. Normal ejection fraction is greater than 59%.    The ECG portion of the study is negative for ischemia.      Past Medical History:   Diagnosis Date    Alcohol abuse     Alcoholic hepatitis     Anemia of chronic disease     Arthritis     generialized    Cancer 12/26/2017    liver    Coagulopathy     Dyspnea on exertion 3/26/2020    Encounter for blood transfusion     End stage liver disease     History of alcohol abuse     Hyperlipidemia     Hypersomnia 9/11/2020    Hypertension     Hyponatremia     Liver cirrhosis     Liver transplant candidate 12/26/2017    Obesity (BMI 30-39.9) 1/5/2016    Prediabetes 4/22/2023    Sleep apnea        Past Surgical History:   Procedure Laterality Date    BILATERAL SALPINGO-OOPHORECTOMY (BSO) Bilateral 1986    with hysterectomy done for abnormal cells    BREAST BIOPSY Left     benign    BREAST LUMPECTOMY      patient is unsure of date or laterality    CATARACT EXTRACTION W/  INTRAOCULAR LENS IMPLANT Right 9/20/2023    Procedure: EXTRACTION, CATARACT, WITH IOL INSERTION;  Surgeon: Kim Mullen MD;  Location: [facility] OR;  Service: Ophthalmology;  Laterality: Right;  Per Elidia Viveros change patient from left eye to right on 9/11 @ 2:38p    CATARACT EXTRACTION W/  INTRAOCULAR LENS IMPLANT Left 10/11/2023    Procedure: EXTRACTION, CATARACT, WITH IOL INSERTION;  Surgeon: Kim Mullen MD;  Location: [facility] OR;  Service: Ophthalmology;  Laterality: Left;  Per Idalia Rosa change the patient to Left and to 10/11 on 9/8 @8:49Am    CHOLECYSTECTOMY      COLONOSCOPY N/A 12/01/2017    Procedure: COLONOSCOPY;  Surgeon: Filemon Fuentes MD;  Location: [address]  [facility]);  Service: Endoscopy;  Laterality: N/A;    COLONOSCOPY N/A 12/05/2017    Procedure: COLONOSCOPY;  Surgeon: José Chavira MD;  [facility]  Service: Endoscopy;  Laterality: N/A;    COLONOSCOPY N/A 12/12/2022    Procedure: COLONOSCOPY;  Surgeon: Gogo Brown MD;  Location: [facility] ENDO;  Service: Endoscopy;  Laterality: N/A;    EPIDURAL STEROID INJECTION N/A 2/28/2023    Procedure: L4/5 IL WILBERTO (with right paramedian approach);  Surgeon: Ras Caballero MD;  Location: [facility] PAIN MGT;  Service: Pain Management;  Laterality: N/A;    EPIDURAL STEROID INJECTION INTO CERVICAL SPINE N/A 01/04/2022    Procedure: C5/6 IL WILBERTO;  Surgeon: Ras Caballero MD;  Location: [facility] PAIN MGT;  Service: Pain Management;  Laterality: N/A;    ESOPHAGOGASTRODUODENOSCOPY N/A 9/14/2023    Procedure: EGD (ESOPHAGOGASTRODUODENOSCOPY);  Surgeon: Angie Zimmerman MD;  Location: [facility];  Service: Endoscopy;  Laterality: N/A;    EXAMINATION UNDER ANESTHESIA N/A 02/18/2020    Procedure: EXAM UNDER ANESTHESIA;  Surgeon: Alden Horowitz MD;  Location: [facility];  Service: General;  Laterality: N/A;    EXCISIONAL HEMORRHOIDECTOMY N/A 02/18/2020    Procedure: HEMORRHOIDECTOMY;  Surgeon: Alden Horowitz MD;  Location: [facility] OR;  Service: General;  Laterality: N/A;    HYSTERECTOMY  1986    due to abnormal paps - ovaries were removed    INJECTION OF ANESTHETIC AGENT AROUND PUDENDAL NERVE N/A 02/18/2020    Procedure: BLOCK, NERVE, PUDENDAL;  Surgeon: Alden Horowitz MD;  Location: [facility] OR;  Service: General;  Laterality: N/A;    INJECTION OF ANESTHETIC AGENT INTO SACROILIAC JOINT Right 06/14/2019    Procedure: right Sacroiliac Joint Injection;  Surgeon: David Desai MD;  Location: [facility] PAIN MGT;  Service: Pain Management;  Laterality: Right;    INJECTION OF ANESTHETIC AGENT INTO SACROILIAC JOINT Bilateral 02/07/2020    Procedure: Bilateral GT bursa + Bilateral Sacroiliac Joint Injection;  Surgeon: David JEAN  MD Giselle;  Location: [facility] PAIN MGT;  Service: Pain Management;  Laterality: Bilateral;    INJECTION OF ANESTHETIC AGENT INTO SACROILIAC JOINT Bilateral 07/07/2020    Procedure: Bilateral GT bursa +SIJ injection;  Surgeon: David Desai MD;  Location: [facility] PAIN MGT;  Service: Pain Management;  Laterality: Bilateral;    INJECTION OF ANESTHETIC AGENT INTO SACROILIAC JOINT Right 10/21/2020    Procedure: Right piriformis + right SIJ + right GT bursa injection;  Surgeon: David Desai MD;  Location: [facility] PAIN MGT;  Service: Pain Management;  Laterality: Right;    INJECTION OF ANESTHETIC AGENT INTO SACROILIAC JOINT Bilateral 04/15/2021    Procedure: Bilateral SIJ + Bilateral Piriformis + Bilateral GT Bursa Injection;  Surgeon: Flaco Frazier MD;  Location: [facility] PAIN MGT;  Service: Pain Management;  Laterality: Bilateral;    INJECTION OF ANESTHETIC AGENT INTO SACROILIAC JOINT Bilateral 04/21/2022    Procedure: Bilateral SIJ + Bilateral Piriformis + Bilateral GT Bursa Injection;  Surgeon: Ras Caballero MD;  Location: [facility] PAIN MGT;  Service: Pain Management;  Laterality: Bilateral;    INJECTION OF ANESTHETIC AGENT INTO SACROILIAC JOINT Right 4/18/2023    Procedure: Right SIJ + Right piriformis +/- Right GT bursa injection;  Surgeon: Ras Caballero MD;  Location: [facility] PAIN MGT;  Service: Pain Management;  Laterality: Right;    INJECTION OF JOINT Bilateral 02/07/2020    Procedure: Bilateral GT bursa + Bilateral Sacroiliac Joint Injection;  Surgeon: David Desai MD;  Location: [facility] PAIN MGT;  Service: Pain Management;  Laterality: Bilateral;    INJECTION OF JOINT Bilateral 07/07/2020    Procedure: Bilateral GT bursa +SIJ injection;  Surgeon: David Desai MD;  Location: [facility] PAIN MGT;  Service: Pain Management;  Laterality: Bilateral;    INJECTION OF JOINT Right 10/21/2020    Procedure: Right piriformis + right SIJ + right GT bursa injection;  Surgeon: David Desai MD;  Location: [facility] PAIN MGT;  Service:  Pain Management;  Laterality: Right;    INJECTION OF JOINT Bilateral 02/08/2021    Procedure: Bilateral GT Bursa Injection;  Surgeon: Anna Kaminski MD;  Location: [facility] PAIN MGT;  Service: Pain Management;  Laterality: Bilateral;    INJECTION OF JOINT Bilateral 04/15/2021    Procedure: Bilateral SIJ + Bilateral Piriformis + Bilateral GT Bursa Injection;  Surgeon: Flaco Frazier MD;  Location: [facility] PAIN MGT;  Service: Pain Management;  Laterality: Bilateral;    INJECTION OF JOINT Bilateral 04/21/2022    Procedure: Bilateral SIJ + Bilateral Piriformis + Bilateral GT Bursa Injection;  Surgeon: Ras Caballero MD;  Location: [facility] PAIN MGT;  Service: Pain Management;  Laterality: Bilateral;    INJECTION OF JOINT Right 10/11/2022    Procedure: right SIJ + right piriformis + right GT bursa injection;  Surgeon: Ras Caballero MD;  Location: [facility] PAIN MGT;  Service: Pain Management;  Laterality: Right;    INJECTION OF PIRIFORMIS MUSCLE Right 06/14/2019    Procedure: Right piriformis injection;  Surgeon: David Desai MD;  Location: [facility] PAIN MGT;  Service: Pain Management;  Laterality: Right;    INJECTION OF PIRIFORMIS MUSCLE Right 10/21/2020    Procedure: Right piriformis + right SIJ + right GT bursa injection;  Surgeon: David Desai MD;  Location: [facility] PAIN MGT;  Service: Pain Management;  Laterality: Right;    INJECTION OF PIRIFORMIS MUSCLE Bilateral 04/15/2021    Procedure: Bilateral SIJ + Bilateral Piriformis + Bilateral GT Bursa Injection;  Surgeon: Flaco Frazier MD;  Location: [facility] PAIN MGT;  Service: Pain Management;  Laterality: Bilateral;    INJECTION OF PIRIFORMIS MUSCLE Bilateral 04/21/2022    Procedure: Bilateral SIJ + Bilateral Piriformis + Bilateral GT Bursa Injection;  Surgeon: Ras Caballero MD;  Location: [facility] PAIN MGT;  Service: Pain Management;  Laterality: Bilateral;    LIVER TRANSPLANT  12/2017    SELECTIVE INJECTION OF ANESTHETIC AGENT AROUND LUMBAR SPINAL NERVE ROOT BY TRANSFORAMINAL  APPROACH Bilateral 10/28/2021    Procedure: BLOCK, SPINAL NERVE ROOT, LUMBAR, SELECTIVE, TRANSFORAMINAL APPROACH bilateral L4-5 and Right Knee injection with RN IV sedation;  Surgeon: Flaco Frazier MD;  Location: [facility];  Service: Pain Management;  Laterality: Bilateral;    SELECTIVE INJECTION OF ANESTHETIC AGENT AROUND LUMBAR SPINAL NERVE ROOT BY TRANSFORAMINAL APPROACH Bilateral 08/18/2022    Procedure: Bilateral L4/5 TF WILBERTO;  Surgeon: Ras Caballero MD;  Location: [facility]IN [facility];  Service: Pain Management;  Laterality: Bilateral;    TRANSFORAMINAL EPIDURAL INJECTION OF STEROID Right 8/15/2023    Procedure: right L5/S1 + S1 TF WILBERTO;  Surgeon: Ras Caballero MD;  Location: [facility];  Service: Pain Management;  Laterality: Right;       Social History     Tobacco Use    Smoking status: Never    Smokeless tobacco: Never   Substance Use Topics    Alcohol use: No     Comment: Currently admitted to inpatient rehab 7/2017    Drug use: No       Family History   Problem Relation Name Age of Onset    Hypertension Mother      Alzheimer's disease Mother      Hypertension Father      Diabetes Father      Diabetes Sister      Ovarian cancer Paternal Aunt      Depression Maternal Grandfather         Patient's Medications   New Prescriptions    No medications on file   Previous Medications    AMITRIPTYLINE (ELAVIL) 25 MG TABLET    TAKE 1 TO 2 TABLETS(25 TO 50 MG) BY MOUTH EVERY NIGHT AS NEEDED FOR INSOMNIA OR PAIN    ATORVASTATIN (LIPITOR) 40 MG TABLET    Take 1 tablet (40 mg total) by mouth every evening.    COLLAGEN-BIOTIN-ASCORBIC ACID ORAL    Take by mouth.    CYCLOBENZAPRINE (FLEXERIL) 10 MG TABLET    TAKE 1/2 TO 1 TABLET BY MOUTH THREE TIMES DAILY AS NEEDED FOR MUSCLE SPASMS    DICLOFENAC SODIUM (VOLTAREN) 1 % GEL    Apply to painful joint area four times a day as needed    DULOXETINE (CYMBALTA) 60 MG CAPSULE    Take 1 capsule (60 mg total) by mouth once daily.    ERGOCALCIFEROL (ERGOCALCIFEROL) 50,000 UNIT  CAP    Take 1 capsule (50,000 Units total) by mouth every 7 days.    FAMOTIDINE (PEPCID) 20 MG TABLET    TAKE 1 TABLET(20 MG) BY MOUTH TWICE DAILY.    FERROUS SULFATE (FEOSOL) 325 MG (65 MG IRON) TAB TABLET    Take 1 tablet (325 mg total) by mouth daily with breakfast.    FLUTICASONE PROPIONATE (FLONASE) 50 MCG/ACTUATION NASAL SPRAY    2 sprays (100 mcg total) by Each Nostril route once daily.    FOLIC ACID (FOLVITE) 1 MG TABLET    Take 1 tablet (1 mg total) by mouth once daily.    FUROSEMIDE (LASIX) 40 MG TABLET    Take 1 tablet (40 mg total) by mouth once daily.    LEVOCETIRIZINE (XYZAL) 5 MG TABLET    Take 1 tablet (5 mg total) by mouth every evening.    MAGNESIUM OXIDE (MAG-OX) 400 MG (241.3 MG MAGNESIUM) TABLET    Take 1 tablet (400 mg total) by mouth once daily.    NIFEDIPINE (PROCARDIA-XL) 60 MG (OSM) 24 HR TABLET    Take 1 tablet (60 mg total) by mouth once daily.    NITROGLYCERIN (NITROSTAT) 0.4 MG SL TABLET    Place 1 tablet (0.4 mg total) under the tongue every 5 (five) minutes as needed for Chest pain.    PANTOPRAZOLE (PROTONIX) 40 MG TABLET    Take 1 tablet (40 mg total) by mouth 2 (two) times daily.    POTASSIUM CHLORIDE (KLOR-CON) 10 MEQ TBSR    Take 1 tablet (10 mEq total) by mouth 2 (two) times daily.    PREGABALIN (LYRICA) 225 MG CAP    TAKE 1 CAPSULE BY MOUTH TWICE DAILY    SPIRONOLACTONE (ALDACTONE) 50 MG TABLET    Take 1 tablet (50 mg total) by mouth once daily.    TACROLIMUS (PROGRAF) 1 MG CAP    Take 2 capsules (2 mg total) by mouth every morning AND 1 capsule (1 mg total) every evening.    VALSARTAN (DIOVAN) 80 MG TABLET    Take 1 tablet (80 mg total) by mouth once daily. Stop losartan   Modified Medications    No medications on file   Discontinued Medications    No medications on file       Review of Systems   Constitutional: Negative.    HENT: Negative.     Eyes: Negative.    Respiratory: Negative.     Cardiovascular:  Positive for chest pain.   Gastrointestinal: Negative.    Genitourinary:  Negative.    Musculoskeletal: Negative.    Skin: Negative.    Neurological: Negative.    Endo/Heme/Allergies: Negative.    Psychiatric/Behavioral: Negative.     All 12 systems otherwise negative.      Wt Readings from Last 3 Encounters:   12/31/24 106.2 kg (234 lb 2.1 oz)   11/12/24 108.3 kg (238 lb 12.1 oz)   10/31/24 106.6 kg (235 lb)     Temp Readings from Last 3 Encounters:   08/26/24 98.1 °F (36.7 °C) (Tympanic)   08/25/24 98.5 °F (36.9 °C) (Oral)   06/25/24 97 °F (36.1 °C) (Tympanic)     BP Readings from Last 3 Encounters:   12/31/24 124/80   11/12/24 138/82   10/31/24 122/86     Pulse Readings from Last 3 Encounters:   12/31/24 88   11/12/24 88   10/31/24 75       /80 (BP Location: Right arm, Patient Position: Sitting)   Pulse 88   Wt 106.2 kg (234 lb 2.1 oz)   LMP 01/01/1985 (Approximate) Comment: 1985  SpO2 99%   BMI 41.47 kg/m²     Objective:   Physical Exam  Vitals and nursing note reviewed.   Constitutional:       General: She is not in acute distress.     Appearance: She is well-developed. She is obese. She is not diaphoretic.   HENT:      Head: Normocephalic and atraumatic.      Nose: Nose normal.   Eyes:      General: No scleral icterus.     Conjunctiva/sclera: Conjunctivae normal.   Neck:      Thyroid: No thyromegaly.      Vascular: No JVD.   Cardiovascular:      Rate and Rhythm: Normal rate and regular rhythm.      Heart sounds: S1 normal and S2 normal. Murmur heard.      No friction rub. No gallop. No S3 or S4 sounds.   Pulmonary:      Effort: Pulmonary effort is normal. No respiratory distress.      Breath sounds: Normal breath sounds. No stridor. No wheezing or rales.   Chest:      Chest wall: No tenderness.   Abdominal:      General: Bowel sounds are normal. There is no distension.      Palpations: Abdomen is soft. There is no mass.      Tenderness: There is no abdominal tenderness. There is no rebound.   Genitourinary:     Comments: Deferred  Musculoskeletal:         General: No  tenderness or deformity. Normal range of motion.      Cervical back: Normal range of motion and neck supple.   Lymphadenopathy:      Cervical: No cervical adenopathy.   Skin:     General: Skin is warm and dry.      Coloration: Skin is not pale.      Findings: No erythema or rash.   Neurological:      Mental Status: She is alert and oriented to person, place, and time.      Motor: No abnormal muscle tone.      Coordination: Coordination normal.   Psychiatric:         Behavior: Behavior normal.         Thought Content: Thought content normal.         Judgment: Judgment normal.         Lab Results   Component Value Date     09/09/2024    K 3.7 09/09/2024     09/09/2024    CO2 25 09/09/2024    BUN 13 09/09/2024    CREATININE 1.0 09/09/2024    GLU 90 09/09/2024    HGBA1C 5.7 (H) 10/06/2023    MG 1.4 (L) 06/13/2024    AST 10 09/09/2024    ALT 10 09/09/2024    ALBUMIN 3.2 (L) 09/09/2024    PROT 6.9 09/09/2024    BILITOT 0.4 09/09/2024    WBC 12.59 09/09/2024    HGB 12.5 09/09/2024    HCT 40.5 09/09/2024    HCT 28 (L) 12/26/2017    MCV 82 09/09/2024    PLT 96 (L) 09/09/2024    INR 1.0 02/27/2020    TSH 1.157 01/31/2024    CHOL 127 04/17/2023    HDL 40 04/17/2023    LDLCALC 66.2 04/17/2023    TRIG 104 04/17/2023    BNP 11 06/25/2024         BNP (pg/mL)   Date Value   06/25/2024 11   07/29/2021 <10   02/27/2020 <10   03/09/2018 117 (H)   02/12/2018 15     INR (no units)   Date Value   02/27/2020 1.0   03/09/2018 1.0   03/09/2018 1.0   02/27/2018 1.1          Assessment:      1. Chest pain, unspecified type    2. Iron deficiency anemia, unspecified iron deficiency anemia type    3. Nonspecific abnormal electrocardiogram (ECG) (EKG)    4. Bilateral leg edema    5. SOB (shortness of breath) on exertion    6. Liver replaced by transplant    7. History of COVID-19    8. Vitamin D deficiency    9. Mixed hyperlipidemia    10. Class 2 severe obesity due to excess calories with serious comorbidity and body mass index (BMI)  of 36.0 to 36.9 in adult    11. BRANNON on CPAP    12. Lumbosacral radiculopathy due to degenerative joint disease of spine    13. Anemia of chronic disease          Plan:     Chest pain, SOB - ongoing dyspnea   - has been having more pressure/JADE and fluid - add Aldactone 50mg   -Nuc stress 10/2024 neg for ischemia.   -ECHO 10/2024 with normal bi V function, mild TR, PASP 43 mmHg.   - iron levels low - cont iron tabs - repeat iron levels and refer to hemeonc  - referred to pulm    2. HLD  - cont statin    3. H/O Liver transplant  - cont f/u with transplant team    4. Chronic pain, lumbar radic  - cont pain tx    5. ACD, Fe def, Vit D def  - cont supplements and monitor  - had h/o Iron infusions     6. BRANNON, seasonal allergies, h/o COVID 19   -cont CPAP, f/u pulm as needed    7. Obesity BMI 41 - 235 lbs  --> BMI 42 - 238 lbs  -->  lbs   - cont weight loss, may start GLP meds if approved, may need to discuss bariatric eval     Visit today included increased complexity associated with the care of the episodic problem chest pain addressed and managing the longitudinal care of the patient due to the serious and/or complex managed problem(s) .      Thank you for allowing me to participate in this patient's care. Please do not hesitate to contact me with any questions or concerns. Consult note has been forwarded to the referral physician.

## 2025-01-02 ENCOUNTER — LAB VISIT (OUTPATIENT)
Dept: LAB | Facility: HOSPITAL | Age: 60
End: 2025-01-02
Attending: INTERNAL MEDICINE
Payer: MEDICAID

## 2025-01-02 DIAGNOSIS — E55.9 VITAMIN D DEFICIENCY: ICD-10-CM

## 2025-01-02 DIAGNOSIS — Z94.4 LIVER REPLACED BY TRANSPLANT: ICD-10-CM

## 2025-01-02 DIAGNOSIS — D50.9 IRON DEFICIENCY ANEMIA, UNSPECIFIED IRON DEFICIENCY ANEMIA TYPE: ICD-10-CM

## 2025-01-02 LAB
ALBUMIN SERPL BCP-MCNC: 3.6 G/DL (ref 3.5–5.2)
ALP SERPL-CCNC: 169 U/L (ref 40–150)
ALT SERPL W/O P-5'-P-CCNC: 34 U/L (ref 10–44)
ANION GAP SERPL CALC-SCNC: 11 MMOL/L (ref 8–16)
AST SERPL-CCNC: 20 U/L (ref 10–40)
BASOPHILS # BLD AUTO: 0.05 K/UL (ref 0–0.2)
BASOPHILS NFR BLD: 0.5 % (ref 0–1.9)
BILIRUB SERPL-MCNC: 0.6 MG/DL (ref 0.1–1)
BUN SERPL-MCNC: 20 MG/DL (ref 8–23)
CALCIUM SERPL-MCNC: 9.7 MG/DL (ref 8.7–10.5)
CHLORIDE SERPL-SCNC: 101 MMOL/L (ref 95–110)
CO2 SERPL-SCNC: 24 MMOL/L (ref 23–29)
CREAT SERPL-MCNC: 1.4 MG/DL (ref 0.5–1.4)
DIFFERENTIAL METHOD BLD: ABNORMAL
EOSINOPHIL # BLD AUTO: 0.2 K/UL (ref 0–0.5)
EOSINOPHIL NFR BLD: 1.8 % (ref 0–8)
ERYTHROCYTE [DISTWIDTH] IN BLOOD BY AUTOMATED COUNT: 15.8 % (ref 11.5–14.5)
EST. GFR  (NO RACE VARIABLE): 42.8 ML/MIN/1.73 M^2
FERRITIN SERPL-MCNC: 150 NG/ML (ref 20–300)
GGT SERPL-CCNC: 29 U/L (ref 8–55)
GLUCOSE SERPL-MCNC: 122 MG/DL (ref 70–110)
HCT VFR BLD AUTO: 44.6 % (ref 37–48.5)
HGB BLD-MCNC: 13.9 G/DL (ref 12–16)
IMM GRANULOCYTES # BLD AUTO: 0.03 K/UL (ref 0–0.04)
IMM GRANULOCYTES NFR BLD AUTO: 0.3 % (ref 0–0.5)
IRON SERPL-MCNC: 54 UG/DL (ref 30–160)
LYMPHOCYTES # BLD AUTO: 3.1 K/UL (ref 1–4.8)
LYMPHOCYTES NFR BLD: 30.4 % (ref 18–48)
MCH RBC QN AUTO: 25.4 PG (ref 27–31)
MCHC RBC AUTO-ENTMCNC: 31.2 G/DL (ref 32–36)
MCV RBC AUTO: 82 FL (ref 82–98)
MONOCYTES # BLD AUTO: 0.7 K/UL (ref 0.3–1)
MONOCYTES NFR BLD: 7.1 % (ref 4–15)
NEUTROPHILS # BLD AUTO: 6 K/UL (ref 1.8–7.7)
NEUTROPHILS NFR BLD: 59.9 % (ref 38–73)
NRBC BLD-RTO: 0 /100 WBC
PLATELET # BLD AUTO: 101 K/UL (ref 150–450)
PMV BLD AUTO: 11.6 FL (ref 9.2–12.9)
POTASSIUM SERPL-SCNC: 4.8 MMOL/L (ref 3.5–5.1)
PROT SERPL-MCNC: 8.6 G/DL (ref 6–8.4)
RBC # BLD AUTO: 5.47 M/UL (ref 4–5.4)
SATURATED IRON: 15 % (ref 20–50)
SODIUM SERPL-SCNC: 136 MMOL/L (ref 136–145)
TOTAL IRON BINDING CAPACITY: 352 UG/DL (ref 250–450)
TRANSFERRIN SERPL-MCNC: 238 MG/DL (ref 200–375)
WBC # BLD AUTO: 10.07 K/UL (ref 3.9–12.7)

## 2025-01-02 PROCEDURE — 82977 ASSAY OF GGT: CPT | Performed by: INTERNAL MEDICINE

## 2025-01-02 PROCEDURE — 85025 COMPLETE CBC W/AUTO DIFF WBC: CPT | Performed by: INTERNAL MEDICINE

## 2025-01-02 PROCEDURE — 36415 COLL VENOUS BLD VENIPUNCTURE: CPT | Performed by: INTERNAL MEDICINE

## 2025-01-02 PROCEDURE — 80197 ASSAY OF TACROLIMUS: CPT | Performed by: INTERNAL MEDICINE

## 2025-01-02 PROCEDURE — 84466 ASSAY OF TRANSFERRIN: CPT | Performed by: INTERNAL MEDICINE

## 2025-01-02 PROCEDURE — 82652 VIT D 1 25-DIHYDROXY: CPT | Performed by: INTERNAL MEDICINE

## 2025-01-02 PROCEDURE — 82728 ASSAY OF FERRITIN: CPT | Performed by: INTERNAL MEDICINE

## 2025-01-02 PROCEDURE — 80053 COMPREHEN METABOLIC PANEL: CPT | Performed by: INTERNAL MEDICINE

## 2025-01-03 LAB — TACROLIMUS BLD-MCNC: 7.1 NG/ML (ref 5–15)

## 2025-01-06 ENCOUNTER — TELEPHONE (OUTPATIENT)
Dept: TRANSPLANT | Facility: CLINIC | Age: 60
End: 2025-01-06
Payer: MEDICAID

## 2025-01-06 DIAGNOSIS — Z94.4 LIVER REPLACED BY TRANSPLANT: ICD-10-CM

## 2025-01-06 LAB — 1,25(OH)2D3 SERPL-MCNC: 85 PG/ML (ref 20–79)

## 2025-01-06 RX ORDER — TACROLIMUS 1 MG/1
CAPSULE ORAL
Qty: 60 CAPSULE | Refills: 11 | Status: SHIPPED | OUTPATIENT
Start: 2025-01-06 | End: 2026-01-07

## 2025-01-06 NOTE — TELEPHONE ENCOUNTER
----- Message from Hailey Wild MD sent at 1/6/2025 10:20 AM CST -----  Labs reviewed.    Please decrease Tac to 1.5/1.5 from 2/2. Repeat labs in 1 week.     Please include alkaline phosphatase isoenzymes and PETH with next set of labs.

## 2025-01-06 NOTE — TELEPHONE ENCOUNTER
Portal message sent with med change.  Repeat labs 2/10/25----- Message from Hailey Wild MD sent at 1/6/2025 11:08 AM CST -----  Let's decrease to 1/1 please. Thank you for checking with the patient.  Hailey  ----- Message -----  From: Mechelle Vallecillo RN  Sent: 1/6/2025  10:49 AM CST  To: MD DR. Junito Baldwin, her current dosed is 2/1 - I called and verified this is what she is taking.  She has the 1 mg capsules.  Do you want to go to 1/1?  ----- Message -----  From: Hailey Wild MD  Sent: 1/6/2025  10:20 AM CST  To: [facility]    Labs reviewed.    Please decrease Tac to 1.5/1.5 from 2/2. Repeat labs in 1 week.     Please include alkaline phosphatase isoenzymes and PETH with next set of labs.

## 2025-01-16 ENCOUNTER — TELEPHONE (OUTPATIENT)
Dept: INTERNAL MEDICINE | Facility: CLINIC | Age: 60
End: 2025-01-16
Payer: MEDICAID

## 2025-01-16 DIAGNOSIS — E04.2 MULTINODULAR THYROID: ICD-10-CM

## 2025-01-16 DIAGNOSIS — R73.03 PREDIABETES: Chronic | ICD-10-CM

## 2025-01-16 DIAGNOSIS — E78.2 MIXED HYPERLIPIDEMIA: Chronic | ICD-10-CM

## 2025-01-16 DIAGNOSIS — D69.6 THROMBOCYTOPENIA: Primary | Chronic | ICD-10-CM

## 2025-01-16 NOTE — TELEPHONE ENCOUNTER
Care Due:                  Date            Visit Type   Department     Provider  --------------------------------------------------------------------------------                                ESTABLISHED                              PATIENT -    [facility] INTERNAL  Last Visit: 09-      [facility]      MEDICINE       Paco Munoz  Next Visit: None Scheduled  None         None Found                                                            Last  Test          Frequency    Reason                     Performed    Due Date  --------------------------------------------------------------------------------    Lipid Panel.  12 months..  atorvastatin.............  04- 04-    [facility] Embedded Care Due Messages. Reference number: [id number].   1/16/2025 7:08:52 AM CST

## 2025-01-16 NOTE — TELEPHONE ENCOUNTER
Refill Routing Note   Medication(s) are not appropriate for processing by [facility] for the following reason(s):        Required labs outdated    [facility] action(s):  Defer     Requires labs : Yes             Appointments  past 12m or future 3m with PCP    Date Provider   Last Visit   9/4/2024 ETHAN Munoz MD   Next Visit   Visit date not found ETHAN Munoz MD   ED visits in past 90 days: 0        Note composed:11:13 AM 01/16/2025

## 2025-01-23 RX ORDER — ATORVASTATIN CALCIUM 40 MG/1
40 TABLET, FILM COATED ORAL NIGHTLY
Qty: 90 TABLET | Refills: 0 | Status: SHIPPED | OUTPATIENT
Start: 2025-01-23

## 2025-01-23 NOTE — TELEPHONE ENCOUNTER
REFILL APPROVED  Medications Ordered This Encounter   Medications    atorvastatin (LIPITOR) 40 MG tablet     Sig: TAKE 1 TABLET(40 MG) BY MOUTH EVERY EVENING     Dispense:  90 tablet     Refill:  0     Labs and Appointment required for more refills.       TO MY TEAM:   Notify her of refill and Labs and Appointment required for more refills.  Schedule her for fasting labs and Thyroid US and F/U appointment (VV or OV) with me a few days later.    Orders Placed This Encounter   Procedures    US Thyroid    Hemoglobin A1C    PLATELET COUNT    Lipid Panel

## 2025-01-24 DIAGNOSIS — I10 ESSENTIAL HYPERTENSION: ICD-10-CM

## 2025-01-27 DIAGNOSIS — K21.00 GASTROESOPHAGEAL REFLUX DISEASE WITH ESOPHAGITIS WITHOUT HEMORRHAGE: Chronic | ICD-10-CM

## 2025-01-27 DIAGNOSIS — R05.3 CHRONIC COUGH: ICD-10-CM

## 2025-01-27 RX ORDER — PANTOPRAZOLE SODIUM 40 MG/1
40 TABLET, DELAYED RELEASE ORAL 2 TIMES DAILY
Qty: 180 TABLET | Refills: 1 | Status: SHIPPED | OUTPATIENT
Start: 2025-01-27 | End: 2025-07-26

## 2025-01-28 DIAGNOSIS — I10 ESSENTIAL HYPERTENSION: ICD-10-CM

## 2025-01-29 RX ORDER — VALSARTAN 80 MG/1
80 TABLET ORAL DAILY
Qty: 90 TABLET | Refills: 1 | Status: SHIPPED | OUTPATIENT
Start: 2025-01-29 | End: 2025-07-28

## 2025-02-01 RX ORDER — VALSARTAN 80 MG/1
TABLET ORAL
Qty: 90 TABLET | Refills: 1 | OUTPATIENT
Start: 2025-02-01

## 2025-02-01 RX ORDER — VALSARTAN 80 MG/1
80 TABLET ORAL DAILY
Qty: 90 TABLET | Refills: 1 | OUTPATIENT
Start: 2025-02-01 | End: 2025-07-31

## 2025-02-01 NOTE — TELEPHONE ENCOUNTER
REFILL NOT APPROVED  REASON: She has upcoming appointment soon. We can address this and any other refills at that appointment.  Requested Prescriptions     Pending Prescriptions Disp Refills    valsartan (DIOVAN) 80 MG tablet 90 tablet 1     Sig: Take 1 tablet (80 mg total) by mouth once daily. Stop losartan      #LMRX   Future Appointments   Date Time Provider Department Center   2/4/2025  8:20 AM LABORATORY, [facility]   2/4/2025  9:40 AM [facility] US3 [facility] ULSOUND [facility]   2/4/2025 10:20 AM [facility] MAMMO1-SCR [facility] MAMMO [facility]   2/10/2025  8:10 AM LABORATORY, [facility]   2/13/2025  8:20 AM Nereida Rubio NP [facility] IM [facility]   2/20/2025  1:30 PM Dionne Bolaños MD ON OPHTHAL [facility]   4/10/2025  9:40 AM Olman Wick MD ONLC CARDIO [facility]   8/13/2025  1:30 PM Silvana Lee PA-C [facility] PULMSVC [facility]   2/10/2026 12:20 PM ONLH MAMMO2 [facility] MAMMO [place]

## 2025-02-01 NOTE — TELEPHONE ENCOUNTER
REFILL NOT APPROVED  REASON: She has upcoming appointment soon. We can address this and any other refills at that appointment.  Requested Prescriptions     Pending Prescriptions Disp Refills    valsartan (DIOVAN) 80 MG tablet [Pharmacy Med Name: VALSARTAN 80MG TABLETS] 90 tablet 1     Sig: TAKE 1 TABLET(80 MG) BY MOUTH DAILY. STOP LOSARTAN      #LMRX   Future Appointments   Date Time Provider Department Center   2/4/2025  8:20 AM LABORATORY, HGV [facility] LAB [facility]   2/4/2025  9:40 AM H[facility] US3 [facility] ULSOUND [facility]   2/4/2025 10:20 AM HGV[facility] MAMMO1-SCR [facility] MAMMO [facility]   2/10/2025  8:10 AM LABORATORY, HGV [facility] LAB [facility]   2/13/2025  8:20 AM Nereida Rubio NP [facility] IM [facility]   2/20/2025  1:30 PM Dionne Bolaños MD [facility] OPHTHAL [facility]   4/10/2025  9:40 AM Olman Wick MD [facility] CARDIO [facility]   8/13/2025  1:30 PM Silvana Lee PA-C HG[facility] PULMSVC [facility]   2/10/2026 12:20 PM ONLH MAMMO2 ONL MAMMO [facility]

## 2025-02-03 NOTE — SUBJECTIVE & OBJECTIVE
Interval History:   Pt more awake today oriented to person, place, situation    Current Facility-Administered Medications   Medication    albumin human 25% bottle 25 g    lactulose 20 gram/30 mL solution Soln 20 g    ondansetron disintegrating tablet 4 mg    pantoprazole EC tablet 40 mg    potassium, sodium phosphates 280-160-250 mg packet 2 packet    predniSONE tablet 15 mg    rifAXIMin tablet 550 mg    sodium chloride 0.9% flush 3 mL       Objective:     Vital Signs (Most Recent):  Temp: 98.7 °F (37.1 °C) (11/28/17 1555)  Pulse: 92 (11/28/17 1555)  Resp: 20 (11/28/17 1555)  BP: 121/61 (11/28/17 1555)  SpO2: 98 % (11/28/17 1555) Vital Signs (24h Range):  Temp:  [97.3 °F (36.3 °C)-98.7 °F (37.1 °C)] 98.7 °F (37.1 °C)  Pulse:  [81-92] 92  Resp:  [16-20] 20  SpO2:  [96 %-99 %] 98 %  BP: (110-121)/(55-64) 121/61     Weight: 69.6 kg (153 lb 7 oz) (11/23/17 2147)  Body mass index is 25.53 kg/m².    Physical Exam   Constitutional:   Confused ; appears very frail   HENT:   Head: Normocephalic.   Eyes: Scleral icterus is present.   Cardiovascular: Normal rate.    Pulmonary/Chest: Effort normal.   Abdominal: Soft.   + fluid wave ; nontender   Neurological:    very slow to respond and difficult to arouse but oriented to person, place, time    Vitals reviewed.      MELD-Na score: 34 at 11/28/2017  3:40 AM  MELD score: 30 at 11/28/2017  3:40 AM  Calculated from:  Serum Creatinine: 0.8 mg/dL (Rounded to 1) at 11/28/2017  3:40 AM  Serum Sodium: 124 mmol/L (Rounded to 125) at 11/28/2017  3:40 AM  Total Bilirubin: 26.4 mg/dL at 11/28/2017  3:40 AM  INR(ratio): 2.6 at 11/28/2017  3:40 AM  Age: 52 years    Significant Labs:  CBC:     Recent Labs  Lab 11/28/17  0340   WBC 7.57   RBC 3.01*   HGB 9.2*   HCT 24.6*   PLT 69*     CMP:     Recent Labs  Lab 11/28/17  0340   GLU 82   CALCIUM 8.8   ALBUMIN 3.1*   PROT 5.2*   *   K 3.4*   CO2 23   CL 91*   BUN 16   CREATININE 0.8   ALKPHOS 229*   *   *   BILITOT  26.4*     Coagulation:   Recent Labs  Lab 11/23/17  2335  11/28/17  0340   INR 2.2*  < > 2.6*   APTT 36.1*  --   --    < > = values in this interval not displayed.    Significant Imaging:  Labs: Reviewed    Review of Systems     fair balance

## 2025-02-07 ENCOUNTER — HOSPITAL ENCOUNTER (OUTPATIENT)
Dept: RADIOLOGY | Facility: HOSPITAL | Age: 60
Discharge: HOME | End: 2025-02-07
Attending: FAMILY MEDICINE
Payer: MEDICAID

## 2025-02-07 VITALS — HEIGHT: 63 IN | WEIGHT: 234.13 LBS | BODY MASS INDEX: 41.48 KG/M2

## 2025-02-07 DIAGNOSIS — Z12.31 ENCOUNTER FOR SCREENING MAMMOGRAM FOR BREAST CANCER: ICD-10-CM

## 2025-02-07 PROCEDURE — 77067 SCR MAMMO BI INCL CAD: CPT | Mod: 26,,, | Performed by: RADIOLOGY

## 2025-02-07 PROCEDURE — 77063 BREAST TOMOSYNTHESIS BI: CPT | Mod: 26,,, | Performed by: RADIOLOGY

## 2025-02-07 PROCEDURE — 77067 SCR MAMMO BI INCL CAD: CPT | Mod: TC

## 2025-02-11 ENCOUNTER — TELEPHONE (OUTPATIENT)
Dept: TRANSPLANT | Facility: CLINIC | Age: 60
End: 2025-02-11
Payer: MEDICAID

## 2025-02-13 NOTE — PROGRESS NOTES
Marcie Scott.    I am happy to report that your recent breast imaging did NOT show evidence of cancer.    An annual mammogram is the best test to screen for breast cancer, but it is not perfect, and it can miss some cancers. So, even though your mammogram was normal, if you notice any lump or change in one of your breasts, please schedule an appointment with me for a proper evaluation.    Thanks for letting me care for you, and thanks for trusting [facility] with your healthcare needs.    All the best,    Dr. LOPEZ

## 2025-02-18 ENCOUNTER — TELEPHONE (OUTPATIENT)
Dept: TRANSPLANT | Facility: CLINIC | Age: 60
End: 2025-02-18
Payer: MEDICAID

## 2025-02-18 NOTE — TELEPHONE ENCOUNTER
Called patient with reminder to get labs liver maintenance .  Patient states she can go Thursday, Feb. 20, 2025.  Will schedule labs.

## 2025-02-19 ENCOUNTER — TELEPHONE (OUTPATIENT)
Dept: INTERNAL MEDICINE | Facility: CLINIC | Age: 60
End: 2025-02-19
Payer: MEDICAID

## 2025-02-19 NOTE — TELEPHONE ENCOUNTER
Spoke with the patient concerning scheduling an appointment with Dr Munoz. The patient stated that she was ill for her last scheduled appointment. The patient was scheduled an appointment on 2/25/25 at 10:40 am. The patient stated understanding appointment information.

## 2025-02-19 NOTE — TELEPHONE ENCOUNTER
----- Message from Josi sent at 2/19/2025 11:14 AM CST -----  Contact: Marcie  Type:  Sooner Apoointment RequestCaller is requesting a sooner appointment. Caller will not accept being placed on the waitlist and is requesting a message be sent to doctor.Name of Caller:Michael is the first available appointment?unknownSymptoms:follow-upWould the patient rather a call back or a response via [facility]? [facility] Back Number:[phone] Additional Information: Patient request to reschedule visit for another date.Thank you,FLORINA

## 2025-02-20 ENCOUNTER — TELEPHONE (OUTPATIENT)
Dept: OPHTHALMOLOGY | Facility: CLINIC | Age: 60
End: 2025-02-20
Payer: MEDICAID

## 2025-02-20 ENCOUNTER — TELEPHONE (OUTPATIENT)
Dept: PULMONOLOGY | Facility: CLINIC | Age: 60
End: 2025-02-20
Payer: MEDICAID

## 2025-02-20 ENCOUNTER — TELEPHONE (OUTPATIENT)
Dept: TRANSPLANT | Facility: CLINIC | Age: 60
End: 2025-02-20
Payer: MEDICAID

## 2025-02-20 NOTE — TELEPHONE ENCOUNTER
"Called pt and rescheduled her appt.     ----- Message from Hardik sent at 2/20/2025  8:44 AM CST -----  Reschedule Existing AppointmentAppt Date: 2/20Type of appt: Est - change in vision request SmallPhysician: SmallReason for rescheduling?  Pt not feeling well; Requesting to r/s for 2/21 (tomorrow) or sometime next weekCaller: SelfContact Preference: [phone] Additional Information:"Thank you for all that you do for our patients"  "

## 2025-02-20 NOTE — TELEPHONE ENCOUNTER
"----- Message from Hardik sent at 2/20/2025  8:40 AM CST -----  Reschedule Existing AppointmentAppt Date: 2/20Type of appt: Fasting LabPhysician: Radha for rescheduling?  Pt not feeling well; Requesting to r/s for 2/21 (tomorrow) or sometime next weekCaller: SelfContact Preference: [phone] Additional Information:"Thank you for all that you do for our patients"  "

## 2025-02-24 ENCOUNTER — LAB VISIT (OUTPATIENT)
Dept: LAB | Facility: HOSPITAL | Age: 60
End: 2025-02-24
Attending: FAMILY MEDICINE
Payer: MEDICAID

## 2025-02-24 DIAGNOSIS — Z94.4 LIVER REPLACED BY TRANSPLANT: ICD-10-CM

## 2025-02-24 LAB
ALBUMIN SERPL BCP-MCNC: 3.5 G/DL (ref 3.5–5.2)
ALP SERPL-CCNC: 147 U/L (ref 40–150)
ALT SERPL W/O P-5'-P-CCNC: 26 U/L (ref 10–44)
ANION GAP SERPL CALC-SCNC: 9 MMOL/L (ref 8–16)
AST SERPL-CCNC: 22 U/L (ref 10–40)
BASOPHILS # BLD AUTO: 0.05 K/UL (ref 0–0.2)
BASOPHILS NFR BLD: 0.4 % (ref 0–1.9)
BILIRUB SERPL-MCNC: 0.3 MG/DL (ref 0.1–1)
BUN SERPL-MCNC: 20 MG/DL (ref 8–23)
CALCIUM SERPL-MCNC: 9.1 MG/DL (ref 8.7–10.5)
CHLORIDE SERPL-SCNC: 105 MMOL/L (ref 95–110)
CO2 SERPL-SCNC: 23 MMOL/L (ref 23–29)
CREAT SERPL-MCNC: 1.1 MG/DL (ref 0.5–1.4)
DIFFERENTIAL METHOD BLD: ABNORMAL
EOSINOPHIL # BLD AUTO: 0.3 K/UL (ref 0–0.5)
EOSINOPHIL NFR BLD: 2.4 % (ref 0–8)
ERYTHROCYTE [DISTWIDTH] IN BLOOD BY AUTOMATED COUNT: 16.3 % (ref 11.5–14.5)
EST. GFR  (NO RACE VARIABLE): 57.2 ML/MIN/1.73 M^2
GGT SERPL-CCNC: 26 U/L (ref 8–55)
GLUCOSE SERPL-MCNC: 89 MG/DL (ref 70–110)
HCT VFR BLD AUTO: 40.6 % (ref 37–48.5)
HGB BLD-MCNC: 12.6 G/DL (ref 12–16)
IMM GRANULOCYTES # BLD AUTO: 0.03 K/UL (ref 0–0.04)
IMM GRANULOCYTES NFR BLD AUTO: 0.2 % (ref 0–0.5)
LYMPHOCYTES # BLD AUTO: 2.7 K/UL (ref 1–4.8)
LYMPHOCYTES NFR BLD: 22 % (ref 18–48)
MCH RBC QN AUTO: 26.2 PG (ref 27–31)
MCHC RBC AUTO-ENTMCNC: 31 G/DL (ref 32–36)
MCV RBC AUTO: 84 FL (ref 82–98)
MONOCYTES # BLD AUTO: 0.7 K/UL (ref 0.3–1)
MONOCYTES NFR BLD: 5.8 % (ref 4–15)
NEUTROPHILS # BLD AUTO: 8.4 K/UL (ref 1.8–7.7)
NEUTROPHILS NFR BLD: 69.2 % (ref 38–73)
NRBC BLD-RTO: 0 /100 WBC
PLATELET # BLD AUTO: 122 K/UL (ref 150–450)
PMV BLD AUTO: 10.7 FL (ref 9.2–12.9)
POTASSIUM SERPL-SCNC: 4.6 MMOL/L (ref 3.5–5.1)
PROT SERPL-MCNC: 7.4 G/DL (ref 6–8.4)
RBC # BLD AUTO: 4.81 M/UL (ref 4–5.4)
SODIUM SERPL-SCNC: 137 MMOL/L (ref 136–145)
WBC # BLD AUTO: 12.09 K/UL (ref 3.9–12.7)

## 2025-02-24 PROCEDURE — 85025 COMPLETE CBC W/AUTO DIFF WBC: CPT | Performed by: INTERNAL MEDICINE

## 2025-02-24 PROCEDURE — 36415 COLL VENOUS BLD VENIPUNCTURE: CPT | Performed by: INTERNAL MEDICINE

## 2025-02-24 PROCEDURE — 80053 COMPREHEN METABOLIC PANEL: CPT | Performed by: INTERNAL MEDICINE

## 2025-02-24 PROCEDURE — 80197 ASSAY OF TACROLIMUS: CPT | Performed by: INTERNAL MEDICINE

## 2025-02-24 PROCEDURE — 82977 ASSAY OF GGT: CPT | Performed by: INTERNAL MEDICINE

## 2025-02-25 ENCOUNTER — HOSPITAL ENCOUNTER (OUTPATIENT)
Dept: RADIOLOGY | Facility: HOSPITAL | Age: 60
Discharge: HOME | End: 2025-02-25
Attending: FAMILY MEDICINE
Payer: MEDICAID

## 2025-02-25 ENCOUNTER — OFFICE VISIT (OUTPATIENT)
Dept: INTERNAL MEDICINE | Facility: CLINIC | Age: 60
End: 2025-02-25
Payer: MEDICAID

## 2025-02-25 VITALS
DIASTOLIC BLOOD PRESSURE: 78 MMHG | SYSTOLIC BLOOD PRESSURE: 130 MMHG | WEIGHT: 235 LBS | RESPIRATION RATE: 18 BRPM | TEMPERATURE: 98 F | HEIGHT: 63 IN | OXYGEN SATURATION: 97 % | BODY MASS INDEX: 41.64 KG/M2 | HEART RATE: 107 BPM

## 2025-02-25 DIAGNOSIS — G47.33 OSA ON CPAP: Chronic | ICD-10-CM

## 2025-02-25 DIAGNOSIS — R73.03 PREDIABETES: Chronic | ICD-10-CM

## 2025-02-25 DIAGNOSIS — T50.2X5A DIURETIC-INDUCED HYPOKALEMIA: Chronic | ICD-10-CM

## 2025-02-25 DIAGNOSIS — M47.27 LUMBOSACRAL RADICULOPATHY DUE TO DEGENERATIVE JOINT DISEASE OF SPINE: Chronic | ICD-10-CM

## 2025-02-25 DIAGNOSIS — R14.0 ABDOMINAL DISTENSION: Primary | ICD-10-CM

## 2025-02-25 DIAGNOSIS — E87.6 DIURETIC-INDUCED HYPOKALEMIA: Chronic | ICD-10-CM

## 2025-02-25 DIAGNOSIS — E66.01 CLASS 3 SEVERE OBESITY DUE TO EXCESS CALORIES WITH SERIOUS COMORBIDITY AND BODY MASS INDEX (BMI) OF 40.0 TO 44.9 IN ADULT: Chronic | ICD-10-CM

## 2025-02-25 DIAGNOSIS — Z94.4 LIVER REPLACED BY TRANSPLANT: Chronic | ICD-10-CM

## 2025-02-25 DIAGNOSIS — G89.4 CHRONIC PAIN SYNDROME: Chronic | ICD-10-CM

## 2025-02-25 DIAGNOSIS — R63.5 WEIGHT GAIN: ICD-10-CM

## 2025-02-25 DIAGNOSIS — J31.0 CHRONIC RHINITIS: ICD-10-CM

## 2025-02-25 DIAGNOSIS — E78.2 MIXED HYPERLIPIDEMIA: Chronic | ICD-10-CM

## 2025-02-25 DIAGNOSIS — E66.813 CLASS 3 SEVERE OBESITY DUE TO EXCESS CALORIES WITH SERIOUS COMORBIDITY AND BODY MASS INDEX (BMI) OF 40.0 TO 44.9 IN ADULT: Chronic | ICD-10-CM

## 2025-02-25 DIAGNOSIS — K21.00 GASTROESOPHAGEAL REFLUX DISEASE WITH ESOPHAGITIS WITHOUT HEMORRHAGE: ICD-10-CM

## 2025-02-25 DIAGNOSIS — I10 ESSENTIAL HYPERTENSION: Chronic | ICD-10-CM

## 2025-02-25 DIAGNOSIS — E53.8 FOLIC ACID DEFICIENCY: Chronic | ICD-10-CM

## 2025-02-25 DIAGNOSIS — E55.9 VITAMIN D DEFICIENCY: Chronic | ICD-10-CM

## 2025-02-25 DIAGNOSIS — E04.2 MULTINODULAR THYROID: ICD-10-CM

## 2025-02-25 DIAGNOSIS — E04.2 MULTINODULAR THYROID: Chronic | ICD-10-CM

## 2025-02-25 LAB — TACROLIMUS BLD-MCNC: 3.1 NG/ML (ref 5–15)

## 2025-02-25 PROCEDURE — 99999 PR PBB SHADOW E&M-EST. PATIENT-LVL V: CPT | Mod: PBBFAC,,, | Performed by: FAMILY MEDICINE

## 2025-02-25 PROCEDURE — 76536 US EXAM OF HEAD AND NECK: CPT | Mod: 26,,, | Performed by: RADIOLOGY

## 2025-02-25 PROCEDURE — 76536 US EXAM OF HEAD AND NECK: CPT | Mod: TC

## 2025-02-25 PROCEDURE — 99215 OFFICE O/P EST HI 40 MIN: CPT | Mod: PBBFAC,25 | Performed by: FAMILY MEDICINE

## 2025-02-25 RX ORDER — NIFEDIPINE 60 MG/1
60 TABLET, EXTENDED RELEASE ORAL DAILY
Qty: 90 TABLET | Refills: 3 | Status: SHIPPED | OUTPATIENT
Start: 2025-02-25

## 2025-02-25 RX ORDER — ERGOCALCIFEROL 1.25 MG/1
50000 CAPSULE ORAL
Qty: 12 CAPSULE | Refills: 3 | Status: SHIPPED | OUTPATIENT
Start: 2025-02-25

## 2025-02-25 RX ORDER — PANTOPRAZOLE SODIUM 40 MG/1
40 TABLET, DELAYED RELEASE ORAL 2 TIMES DAILY
Qty: 180 TABLET | Refills: 1 | Status: SHIPPED | OUTPATIENT
Start: 2025-02-25 | End: 2025-08-24

## 2025-02-25 RX ORDER — VALSARTAN 80 MG/1
80 TABLET ORAL DAILY
Qty: 90 TABLET | Refills: 1 | Status: SHIPPED | OUTPATIENT
Start: 2025-02-25 | End: 2025-08-24

## 2025-02-25 RX ORDER — POTASSIUM CHLORIDE 750 MG/1
10 TABLET, EXTENDED RELEASE ORAL 2 TIMES DAILY
Qty: 180 TABLET | Refills: 3 | Status: SHIPPED | OUTPATIENT
Start: 2025-02-25

## 2025-02-25 RX ORDER — FAMOTIDINE 20 MG/1
20 TABLET, FILM COATED ORAL 2 TIMES DAILY
Qty: 180 TABLET | Refills: 3 | Status: SHIPPED | OUTPATIENT
Start: 2025-02-25

## 2025-02-25 RX ORDER — LEVOCETIRIZINE DIHYDROCHLORIDE 5 MG/1
5 TABLET, FILM COATED ORAL NIGHTLY
Qty: 30 TABLET | Refills: 3 | Status: SHIPPED | OUTPATIENT
Start: 2025-02-25 | End: 2026-02-25

## 2025-02-25 RX ORDER — ATORVASTATIN CALCIUM 40 MG/1
40 TABLET, FILM COATED ORAL NIGHTLY
Qty: 90 TABLET | Refills: 0 | Status: SHIPPED | OUTPATIENT
Start: 2025-02-25

## 2025-02-25 RX ORDER — DULOXETIN HYDROCHLORIDE 60 MG/1
60 CAPSULE, DELAYED RELEASE ORAL DAILY
Qty: 30 CAPSULE | Refills: 5 | Status: SHIPPED | OUTPATIENT
Start: 2025-02-25

## 2025-02-25 RX ORDER — FLUTICASONE PROPIONATE 50 MCG
2 SPRAY, SUSPENSION (ML) NASAL DAILY
Qty: 16 G | Refills: 11 | Status: SHIPPED | OUTPATIENT
Start: 2025-02-25

## 2025-02-25 RX ORDER — FOLIC ACID 1 MG/1
1 TABLET ORAL DAILY
Qty: 90 TABLET | Refills: 3 | Status: SHIPPED | OUTPATIENT
Start: 2025-02-25 | End: 2026-02-25

## 2025-02-25 NOTE — PROGRESS NOTES
"OFFICE VISIT 2/25/25 10:40 AM CST    CHIEF COMPLAINT: Follow-up    She comes in complaining about abdominal distension, involuntary weight gain, and worsening reflux esophagitis symptoms, where she feels as though her stomach contents are regurgitating up into her mouth, sometimes to the point where she has to throw up. No other emesis reported. No diarrhea reported. GERD symptoms worse when she is in a recumbent position. She is taking her acid suppression medications as instructed. Significant interval weight gain is noted in spite of her efforts at dietary restrictions. Her abdomen is very distended on exam but non-tender.     We discussed the differential diagnosis. She agreed to a liver transplant team e-consult, and further evaluation would be based on their recommendations. She says that she's not using her CPAP consistently. I encouraged her to do so.     I reviewed her thyroid ultrasound results with her, which showed a new nodule that needs a fine needle aspirate. She consented to this. Other chronic conditions are represented as compensated/controlled and stable, and she wants to continue current medications unchanged.      1. Abdominal distension  -     E-Consult to Liver Transplant    2. Weight gain    3. Class 3 severe obesity due to excess calories with serious comorbidity and body mass index (BMI) of 40.0 to 44.9 in adult  Assessment & Plan:  Estimated body mass index is 41.63 kg/m² as calculated from the following:    Height as of this encounter: 5' 3" (1.6 m).    Weight as of this encounter: 106.6 kg (235 lb 0.2 oz).   Wt Readings from Last 20 Encounters:   02/25/25 106.6 kg (235 lb 0.2 oz)   02/07/25 106.2 kg (234 lb 2.1 oz)   12/31/24 106.2 kg (234 lb 2.1 oz)   11/12/24 108.3 kg (238 lb 12.1 oz)   10/31/24 106.6 kg (235 lb)   10/31/24 106.6 kg (235 lb)   10/15/24 107 kg (235 lb 12.9 oz)   08/26/24 102.3 kg (225 lb 8.5 oz)   08/25/24 99.8 kg (220 lb)   08/13/24 102.6 kg (226 lb 3.1 oz)   07/15/24 " 100.1 kg (220 lb 10.9 oz)   06/25/24 100.7 kg (222 lb 0.1 oz)   06/19/24 100 kg (220 lb 7.4 oz)   03/12/24 96.7 kg (213 lb 3 oz)   02/29/24 98.2 kg (216 lb 7.9 oz)   01/31/24 96.6 kg (212 lb 15.4 oz)   01/22/24 90.3 kg (199 lb)   10/26/23 98.8 kg (217 lb 13 oz)   09/20/23 90.7 kg (200 lb)   09/14/23 100.7 kg (222 lb)          4. Gastroesophageal reflux disease with esophagitis without hemorrhage  -     E-Consult to Liver Transplant  -     famotidine (PEPCID) 20 MG tablet; Take 1 tablet (20 mg total) by mouth 2 (two) times daily.  Dispense: 180 tablet; Refill: 3  -     pantoprazole (PROTONIX) 40 MG tablet; Take 1 tablet (40 mg total) by mouth 2 (two) times daily.  Dispense: 180 tablet; Refill: 1    5. BRANNON on CPAP  Overview:  >>OVERVIEW FOR HYPERSOMNIA WRITTEN ON 9/11/2020 11:17 AM BY KATJA ROSE MD    Home Sleep Studies   Date/Time: 7/15/2020 8:00 AM  Performed by: Chandra Toney MD  Authorized by: Jarrod Roberto MD   PHYSICIAN INTERPRETATION AND COMMENTS: Findings are consistent with primary snoring. very mild sleep apnea only by RDI criteria. RDI was 13.0 events per hour. AHI was 4.0 events per hour. Oxygen saturation shayla was 86.1%. Loud snoring. Surgical procedures to ameliorate snoring may be considered. If suspicion for sleep disorder breathing is sufficient then repeat testing is indicated. Weight loss also indicated.  CLINICAL HISTORY: 54 year old female presented with: 13 inch neck, BMI of 30, an [place] sleepiness score of 12, history of hypertension and symptoms of nocturnal snoring, waking up choking and witnessed apneas. Based on the clinical history, the patient has a high pre-test probability of having mild BRANNON. Stop Bang score 5.      6. Multinodular thyroid  Overview:  US SOFT TISSUE HEAD NECK THYROID 06/08/2020  FINDINGS: Overall gland volume measures 6.5 cc. The right lobe measures 3.8 x 1.4 x 1.2 cm. Left lobe measures 3.6 x 1.3 x 1.3 cm. There is a colloid cystic nodule measuring 5 mm  in the upper right lobe. There is a solid heterogeneous nodule measuring up to 8 mm in the midportion of the right lobe. Within the inferior portion of the right lobe there is a 1.6 cm solid nodule which is isoechoic to the gland. Within the isthmus there is also a 1.6 cm nodule with similar characteristics. Per ACR TI rads criteria, follow-up in 1, 3, and 5 years is suggested for the aforementioned nodules.  Nodules are seen in the left lobe as well. For example, there is a solid isoechoic nodule with hypoechoic halo in the midportion of the gland that measures up to 1.4 cm. A mixed cystic and solid nodule seen in the lower portion of the left lobe measuring just under 1 cm.  IMPRESSION: Right lower lobe in isthmus nodules requiring follow-up as above. No nodule meeting criteria for fine-needle aspiration at this time.  PLAN: Annual thyroid ultrasound. (ENT E-consult recommendations)    Orders:  -     US FNA Thyroid, 1st Lesion; Future; Expected date: 02/25/2025    7. Liver replaced by transplant  -     E-Consult to Liver Transplant    8. Mixed hyperlipidemia  -     atorvastatin (LIPITOR) 40 MG tablet; Take 1 tablet (40 mg total) by mouth every evening.  Dispense: 90 tablet; Refill: 0    9. Chronic pain syndrome  -     DULoxetine (CYMBALTA) 60 MG capsule; Take 1 capsule (60 mg total) by mouth once daily.  Dispense: 30 capsule; Refill: 5    10. Lumbar radiculopathy due to degenerative joint disease of spine  -     DULoxetine (CYMBALTA) 60 MG capsule; Take 1 capsule (60 mg total) by mouth once daily.  Dispense: 30 capsule; Refill: 5    11. Vitamin D deficiency  -     ergocalciferol (ERGOCALCIFEROL) 50,000 unit Cap; Take 1 capsule (50,000 Units total) by mouth every 7 days.  Dispense: 12 capsule; Refill: 3    12. Chronic rhinitis  -     fluticasone propionate (FLONASE) 50 mcg/actuation nasal spray; 2 sprays (100 mcg total) by Each Nostril route once daily.  Dispense: 16 g; Refill: 11  -     levocetirizine (XYZAL) 5 MG  "tablet; Take 1 tablet (5 mg total) by mouth every evening.  Dispense: 30 tablet; Refill: 3    13. Folic acid deficiency  -     folic acid (FOLVITE) 1 MG tablet; Take 1 tablet (1 mg total) by mouth once daily.  Dispense: 90 tablet; Refill: 3    14. Essential hypertension  -     NIFEdipine (PROCARDIA-XL) 60 MG (OSM) 24 hr tablet; Take 1 tablet (60 mg total) by mouth once daily.  Dispense: 90 tablet; Refill: 3  -     valsartan (DIOVAN) 80 MG tablet; Take 1 tablet (80 mg total) by mouth once daily. Stop losartan  Dispense: 90 tablet; Refill: 1    15. Diuretic-induced hypokalemia  -     potassium chloride (KLOR-CON) 10 MEQ TbSR; Take 1 tablet (10 mEq total) by mouth 2 (two) times daily.  Dispense: 180 tablet; Refill: 3    16. Prediabetes  -     Hemoglobin A1C; Future; Expected date: 02/25/2025    No other significant complaints or concerns were reported.  Unless specified otherwise, chronic conditions are represented as and appear to be compensated/controlled and stable.  Today's visit involved the intricate management of episodic problem(s) and the ongoing care for the patient's serious or complex condition(s) listed above, reflecting the inherent complexity of providing longitudinal, comprehensive evaluation and management as the central hub for the patient's primary care services.    Except as noted herein, ROS is otherwise negative.    Vitals:    02/25/25 1034   BP: 130/78   BP Location: Left arm   Patient Position: Sitting   Pulse: 107   Resp: 18   Temp: 97.5 °F (36.4 °C)   TempSrc: Tympanic   SpO2: 97%   Weight: 106.6 kg (235 lb 0.2 oz)   Height: 5' 3" (1.6 m)   Physical Exam  Vitals reviewed.   Constitutional:       General: She is not in acute distress.     Appearance: Normal appearance. She is not ill-appearing, toxic-appearing or diaphoretic.   HENT:      Head: Normocephalic and atraumatic.   Eyes:      General: No scleral icterus.     Conjunctiva/sclera: Conjunctivae normal.   Cardiovascular:      Rate and " "Rhythm: Normal rate and regular rhythm.   Pulmonary:      Effort: Pulmonary effort is normal.      Breath sounds: Normal breath sounds.   Abdominal:      General: Bowel sounds are normal. There is distension.      Tenderness: There is no abdominal tenderness.   Skin:     General: Skin is warm and dry.   Neurological:      Mental Status: She is alert and oriented to person, place, and time. Mental status is at baseline.   Psychiatric:         Mood and Affect: Mood normal.         Behavior: Behavior normal.         Judgment: Judgment normal.       Documentation entered by me for this encounter may have been done in part using speech-recognition technology. Although I have made an effort to ensure accuracy, "sound like" errors may exist and should be interpreted in context.    WRAP-UP INSTRUCTIONS  Lab today.  Thyroid US FNA at her earliest convenience.  VV with me about 2 weeks after FNA.  "

## 2025-02-25 NOTE — ASSESSMENT & PLAN NOTE
"Estimated body mass index is 41.63 kg/m² as calculated from the following:    Height as of this encounter: 5' 3" (1.6 m).    Weight as of this encounter: 106.6 kg (235 lb 0.2 oz).   Wt Readings from Last 20 Encounters:   02/25/25 106.6 kg (235 lb 0.2 oz)   02/07/25 106.2 kg (234 lb 2.1 oz)   12/31/24 106.2 kg (234 lb 2.1 oz)   11/12/24 108.3 kg (238 lb 12.1 oz)   10/31/24 106.6 kg (235 lb)   10/31/24 106.6 kg (235 lb)   10/15/24 107 kg (235 lb 12.9 oz)   08/26/24 102.3 kg (225 lb 8.5 oz)   08/25/24 99.8 kg (220 lb)   08/13/24 102.6 kg (226 lb 3.1 oz)   07/15/24 100.1 kg (220 lb 10.9 oz)   06/25/24 100.7 kg (222 lb 0.1 oz)   06/19/24 100 kg (220 lb 7.4 oz)   03/12/24 96.7 kg (213 lb 3 oz)   02/29/24 98.2 kg (216 lb 7.9 oz)   01/31/24 96.6 kg (212 lb 15.4 oz)   01/22/24 90.3 kg (199 lb)   10/26/23 98.8 kg (217 lb 13 oz)   09/20/23 90.7 kg (200 lb)   09/14/23 100.7 kg (222 lb)      "

## 2025-02-26 ENCOUNTER — E-CONSULT (OUTPATIENT)
Dept: HEPATOLOGY | Facility: CLINIC | Age: 60
End: 2025-02-26
Payer: MEDICAID

## 2025-02-26 DIAGNOSIS — K21.9 GASTROESOPHAGEAL REFLUX DISEASE, UNSPECIFIED WHETHER ESOPHAGITIS PRESENT: Primary | ICD-10-CM

## 2025-02-26 PROCEDURE — 99451 NTRPROF PH1/NTRNET/EHR 5/>: CPT | Mod: S$PBB,,, | Performed by: INTERNAL MEDICINE

## 2025-02-26 NOTE — CONSULTS
[facility] - [facility]atology  Response for E-Consult     Patient Name: Marcie Bazan  MRN: [id number]  Primary Care Provider: ETHAN Munoz MD   Requesting Provider: ETHAN Munoz MD  E-Consult to Liver Transplant  Consult performed by: Sterling Evans MD  Consult ordered by: ETHAN Munoz MD          Recommendation: Repeat EGD and biopsy to rule CMV or opportunistic infections  Acid suppression and avoid NSAIDS till then    Additional future steps to consider:     Total time of Consultation: 15 minute    I did not speak to the requesting provider verbally about this.     *This eConsult is based on the clinical data available to me and is furnished without benefit of a physical examination. The eConsult will need to be interpreted in light of any clinical issues or changes in patient status not available to me at the time of filing this eConsults. Significant changes in patient condition or level of acuity should result in immediate formal consultation and reevaluation. Please alert me if you have further questions.    Thank you for this eConsult referral.     Sterling Evans MD  [facility] - [facility]atology

## 2025-02-27 ENCOUNTER — RESULTS FOLLOW-UP (OUTPATIENT)
Dept: HEPATOLOGY | Facility: CLINIC | Age: 60
End: 2025-02-27
Payer: MEDICAID

## 2025-02-27 NOTE — TELEPHONE ENCOUNTER
Patient messaged through [facility] patient portal.  Informed tacrolimus level subtherapeutic.  Instructed to repeat labs on 3/3.  Encouraged to call for questions    ----- Message from Hailey Wild MD sent at 2/27/2025 10:07 AM CST -----  Labs reviewed.    Tac subtherapeutic. Please repeat labs in 1 week. If tac remains low, then will make changes to dosing.  ----- Message -----  From: [facility] [address] [facility] Interface  Sent: 2/24/2025   7:03 PM CST  To: aHiley Wild MD

## 2025-03-01 ENCOUNTER — RESULTS FOLLOW-UP (OUTPATIENT)
Dept: INTERNAL MEDICINE | Facility: CLINIC | Age: 60
End: 2025-03-01

## 2025-03-03 ENCOUNTER — LAB VISIT (OUTPATIENT)
Dept: LAB | Facility: HOSPITAL | Age: 60
End: 2025-03-03
Attending: FAMILY MEDICINE
Payer: MEDICAID

## 2025-03-03 DIAGNOSIS — G89.4 CHRONIC PAIN SYNDROME: ICD-10-CM

## 2025-03-03 DIAGNOSIS — G47.01 INSOMNIA SECONDARY TO CHRONIC PAIN: ICD-10-CM

## 2025-03-03 DIAGNOSIS — M79.18 PIRIFORMIS MUSCLE PAIN: ICD-10-CM

## 2025-03-03 DIAGNOSIS — G95.9 CERVICAL MYELOPATHY: ICD-10-CM

## 2025-03-03 DIAGNOSIS — M50.30 DDD (DEGENERATIVE DISC DISEASE), CERVICAL: ICD-10-CM

## 2025-03-03 DIAGNOSIS — Z94.4 LIVER REPLACED BY TRANSPLANT: ICD-10-CM

## 2025-03-03 DIAGNOSIS — M54.16 BILATERAL LUMBAR RADICULOPATHY: ICD-10-CM

## 2025-03-03 DIAGNOSIS — G89.29 INSOMNIA SECONDARY TO CHRONIC PAIN: ICD-10-CM

## 2025-03-03 DIAGNOSIS — M54.16 LUMBAR RADICULOPATHY: ICD-10-CM

## 2025-03-03 LAB
ALBUMIN SERPL BCP-MCNC: 3.5 G/DL (ref 3.5–5.2)
ALP SERPL-CCNC: 151 U/L (ref 40–150)
ALT SERPL W/O P-5'-P-CCNC: 32 U/L (ref 10–44)
ANION GAP SERPL CALC-SCNC: 8 MMOL/L (ref 8–16)
AST SERPL-CCNC: 23 U/L (ref 10–40)
BASOPHILS # BLD AUTO: 0.04 K/UL (ref 0–0.2)
BASOPHILS NFR BLD: 0.4 % (ref 0–1.9)
BILIRUB SERPL-MCNC: 0.4 MG/DL (ref 0.1–1)
BUN SERPL-MCNC: 14 MG/DL (ref 8–23)
CALCIUM SERPL-MCNC: 9.3 MG/DL (ref 8.7–10.5)
CHLORIDE SERPL-SCNC: 105 MMOL/L (ref 95–110)
CO2 SERPL-SCNC: 25 MMOL/L (ref 23–29)
CREAT SERPL-MCNC: 1.1 MG/DL (ref 0.5–1.4)
DIFFERENTIAL METHOD BLD: ABNORMAL
EOSINOPHIL # BLD AUTO: 0.2 K/UL (ref 0–0.5)
EOSINOPHIL NFR BLD: 1.8 % (ref 0–8)
ERYTHROCYTE [DISTWIDTH] IN BLOOD BY AUTOMATED COUNT: 16 % (ref 11.5–14.5)
EST. GFR  (NO RACE VARIABLE): 57.2 ML/MIN/1.73 M^2
GLUCOSE SERPL-MCNC: 95 MG/DL (ref 70–110)
HCT VFR BLD AUTO: 42.9 % (ref 37–48.5)
HGB BLD-MCNC: 13.5 G/DL (ref 12–16)
IMM GRANULOCYTES # BLD AUTO: 0.03 K/UL (ref 0–0.04)
IMM GRANULOCYTES NFR BLD AUTO: 0.3 % (ref 0–0.5)
LYMPHOCYTES # BLD AUTO: 2.2 K/UL (ref 1–4.8)
LYMPHOCYTES NFR BLD: 21.3 % (ref 18–48)
MCH RBC QN AUTO: 26 PG (ref 27–31)
MCHC RBC AUTO-ENTMCNC: 31.5 G/DL (ref 32–36)
MCV RBC AUTO: 83 FL (ref 82–98)
MONOCYTES # BLD AUTO: 0.6 K/UL (ref 0.3–1)
MONOCYTES NFR BLD: 5.7 % (ref 4–15)
NEUTROPHILS # BLD AUTO: 7.4 K/UL (ref 1.8–7.7)
NEUTROPHILS NFR BLD: 70.5 % (ref 38–73)
NRBC BLD-RTO: 0 /100 WBC
PLATELET # BLD AUTO: 112 K/UL (ref 150–450)
PMV BLD AUTO: 10.4 FL (ref 9.2–12.9)
POTASSIUM SERPL-SCNC: 4.5 MMOL/L (ref 3.5–5.1)
PROT SERPL-MCNC: 7.8 G/DL (ref 6–8.4)
RBC # BLD AUTO: 5.19 M/UL (ref 4–5.4)
SODIUM SERPL-SCNC: 138 MMOL/L (ref 136–145)
WBC # BLD AUTO: 10.52 K/UL (ref 3.9–12.7)

## 2025-03-03 PROCEDURE — 36415 COLL VENOUS BLD VENIPUNCTURE: CPT | Performed by: INTERNAL MEDICINE

## 2025-03-03 PROCEDURE — 85025 COMPLETE CBC W/AUTO DIFF WBC: CPT | Performed by: INTERNAL MEDICINE

## 2025-03-03 PROCEDURE — 80053 COMPREHEN METABOLIC PANEL: CPT | Performed by: INTERNAL MEDICINE

## 2025-03-03 PROCEDURE — 80197 ASSAY OF TACROLIMUS: CPT | Performed by: INTERNAL MEDICINE

## 2025-03-03 NOTE — TELEPHONE ENCOUNTER
Good Morning/Afternoon,     Pt is requesting for a refill of: Lyrica 225mg cap  Last filed:08/122024  Last encounter:08/12/2024  Up coming apt: n/a  Pharmacy: [id number] - [place], [place] - [address] AT [facility] [facility] [address]       Medical Assistant  Sumi LOPEZ [facility]

## 2025-03-04 LAB — TACROLIMUS BLD-MCNC: 2.7 NG/ML (ref 5–15)

## 2025-03-04 RX ORDER — PREGABALIN 225 MG/1
225 CAPSULE ORAL 2 TIMES DAILY
Qty: 60 CAPSULE | Refills: 0 | Status: SHIPPED | OUTPATIENT
Start: 2025-03-04

## 2025-03-11 ENCOUNTER — TELEPHONE (OUTPATIENT)
Dept: TRANSPLANT | Facility: CLINIC | Age: 60
End: 2025-03-11
Payer: MEDICAID

## 2025-03-11 NOTE — TELEPHONE ENCOUNTER
"Recent dose change to Tacrolimus required ordering 0.5 mg capsules.  Patient just received the 0.5 mg capsules and was due to have labs tomorrow.  This would not have shown the effect that we would have projected to see.  Pushed labs back to 3/17.  New card made for .  Patient agreed.    ----- Message from Hardik sent at 3/11/2025  2:48 PM CDT -----  Consult/AdvisoryName Of Caller: SelfContact Preference?: [phone] What is the nature of the call?: Calling to speak w/ Mica. Stating she just started taking tacrolimus (PROGRAF) 0.5 MG Cap. Requesting further clarificationAdditional Notes:"Thank you for all that you do for our patients"  "

## 2025-03-13 ENCOUNTER — TELEPHONE (OUTPATIENT)
Dept: TRANSPLANT | Facility: CLINIC | Age: 60
End: 2025-03-13
Payer: MEDICAID

## 2025-03-13 NOTE — TELEPHONE ENCOUNTER
Incoming call from patient describing alarming episode while she was driving.  She stated her vision became foggy, she felt her arms shaking and she almost felt like she was leaning and had lost control of her body. She stated she made it home but is still feeling dizzy.  Patient urged to be checked out in ED or PCP office.  States has an appt with Oncologist.  Encouraged to have someone drive her there if she felt she must see the Dr. She said her Son in law would drive her.  Urged to have symptoms described checked out as these could be cardiac or neurological in manner.  Patient stated she would.

## 2025-03-17 ENCOUNTER — TELEPHONE (OUTPATIENT)
Dept: INTERNAL MEDICINE | Facility: CLINIC | Age: 60
End: 2025-03-17
Payer: MEDICAID

## 2025-03-17 ENCOUNTER — LAB VISIT (OUTPATIENT)
Dept: LAB | Facility: HOSPITAL | Age: 60
End: 2025-03-17
Attending: INTERNAL MEDICINE
Payer: MEDICAID

## 2025-03-17 DIAGNOSIS — Z94.4 LIVER REPLACED BY TRANSPLANT: ICD-10-CM

## 2025-03-17 LAB
ALBUMIN SERPL BCP-MCNC: 3.4 G/DL (ref 3.5–5.2)
ALP SERPL-CCNC: 138 U/L (ref 40–150)
ALT SERPL W/O P-5'-P-CCNC: 36 U/L (ref 10–44)
ANION GAP SERPL CALC-SCNC: 12 MMOL/L (ref 8–16)
AST SERPL-CCNC: 21 U/L (ref 10–40)
BASOPHILS # BLD AUTO: 0.05 K/UL (ref 0–0.2)
BASOPHILS NFR BLD: 0.5 % (ref 0–1.9)
BILIRUB SERPL-MCNC: 0.5 MG/DL (ref 0.1–1)
BUN SERPL-MCNC: 18 MG/DL (ref 8–23)
CALCIUM SERPL-MCNC: 9 MG/DL (ref 8.7–10.5)
CHLORIDE SERPL-SCNC: 103 MMOL/L (ref 95–110)
CO2 SERPL-SCNC: 23 MMOL/L (ref 23–29)
CREAT SERPL-MCNC: 1 MG/DL (ref 0.5–1.4)
DIFFERENTIAL METHOD BLD: ABNORMAL
EOSINOPHIL # BLD AUTO: 0.2 K/UL (ref 0–0.5)
EOSINOPHIL NFR BLD: 2.2 % (ref 0–8)
ERYTHROCYTE [DISTWIDTH] IN BLOOD BY AUTOMATED COUNT: 16 % (ref 11.5–14.5)
EST. GFR  (NO RACE VARIABLE): >60 ML/MIN/1.73 M^2
GLUCOSE SERPL-MCNC: 89 MG/DL (ref 70–110)
HCT VFR BLD AUTO: 42.1 % (ref 37–48.5)
HGB BLD-MCNC: 12.9 G/DL (ref 12–16)
IMM GRANULOCYTES # BLD AUTO: 0.03 K/UL (ref 0–0.04)
IMM GRANULOCYTES NFR BLD AUTO: 0.3 % (ref 0–0.5)
LYMPHOCYTES # BLD AUTO: 2.7 K/UL (ref 1–4.8)
LYMPHOCYTES NFR BLD: 24.3 % (ref 18–48)
MCH RBC QN AUTO: 25.7 PG (ref 27–31)
MCHC RBC AUTO-ENTMCNC: 30.6 G/DL (ref 32–36)
MCV RBC AUTO: 84 FL (ref 82–98)
MONOCYTES # BLD AUTO: 0.7 K/UL (ref 0.3–1)
MONOCYTES NFR BLD: 6.7 % (ref 4–15)
NEUTROPHILS # BLD AUTO: 7.3 K/UL (ref 1.8–7.7)
NEUTROPHILS NFR BLD: 66 % (ref 38–73)
NRBC BLD-RTO: 0 /100 WBC
PLATELET # BLD AUTO: 154 K/UL (ref 150–450)
PMV BLD AUTO: 10.4 FL (ref 9.2–12.9)
POTASSIUM SERPL-SCNC: 4.5 MMOL/L (ref 3.5–5.1)
PROT SERPL-MCNC: 7.3 G/DL (ref 6–8.4)
RBC # BLD AUTO: 5.02 M/UL (ref 4–5.4)
SODIUM SERPL-SCNC: 138 MMOL/L (ref 136–145)
WBC # BLD AUTO: 11.04 K/UL (ref 3.9–12.7)

## 2025-03-17 PROCEDURE — 80197 ASSAY OF TACROLIMUS: CPT | Performed by: INTERNAL MEDICINE

## 2025-03-17 PROCEDURE — 36415 COLL VENOUS BLD VENIPUNCTURE: CPT | Performed by: INTERNAL MEDICINE

## 2025-03-17 PROCEDURE — 80053 COMPREHEN METABOLIC PANEL: CPT | Performed by: INTERNAL MEDICINE

## 2025-03-17 PROCEDURE — 85025 COMPLETE CBC W/AUTO DIFF WBC: CPT | Performed by: INTERNAL MEDICINE

## 2025-03-17 NOTE — TELEPHONE ENCOUNTER
----- Message from Kaylin sent at 3/17/2025  9:05 AM CDT -----  Contact: GUADALUPE TOMLIN [[id number]]  ..Type:  Patient Requesting CallWho Called:GUADALUPE TOMLIN [[id number]]Does the patient know what this is regarding?:pt states she has been experiencing headaches and she blacked out while driving and states that the transplant doctor stated that she should schedule a appt immediately to see you Would the patient rather a call back or a response via [facility]? [facility] Call Back Number:.[phone] Additional Information:

## 2025-03-18 ENCOUNTER — OFFICE VISIT (OUTPATIENT)
Dept: INTERNAL MEDICINE | Facility: CLINIC | Age: 60
End: 2025-03-18
Payer: MEDICAID

## 2025-03-18 ENCOUNTER — HOSPITAL ENCOUNTER (EMERGENCY)
Facility: HOSPITAL | Age: 60
Discharge: HOME | End: 2025-03-18
Attending: EMERGENCY MEDICINE
Payer: MEDICAID

## 2025-03-18 VITALS
TEMPERATURE: 97 F | HEART RATE: 109 BPM | HEIGHT: 63 IN | BODY MASS INDEX: 41.05 KG/M2 | SYSTOLIC BLOOD PRESSURE: 108 MMHG | WEIGHT: 231.69 LBS | OXYGEN SATURATION: 97 % | DIASTOLIC BLOOD PRESSURE: 64 MMHG

## 2025-03-18 VITALS
RESPIRATION RATE: 20 BRPM | TEMPERATURE: 99 F | DIASTOLIC BLOOD PRESSURE: 56 MMHG | SYSTOLIC BLOOD PRESSURE: 128 MMHG | OXYGEN SATURATION: 97 % | HEART RATE: 79 BPM

## 2025-03-18 DIAGNOSIS — R51.9 ACUTE NONINTRACTABLE HEADACHE, UNSPECIFIED HEADACHE TYPE: ICD-10-CM

## 2025-03-18 DIAGNOSIS — Z94.4 HISTORY OF LIVER TRANSPLANT: ICD-10-CM

## 2025-03-18 DIAGNOSIS — R51.9 FRONTAL HEADACHE: ICD-10-CM

## 2025-03-18 DIAGNOSIS — R61 DIAPHORESIS: ICD-10-CM

## 2025-03-18 DIAGNOSIS — R55 SYNCOPE, UNSPECIFIED SYNCOPE TYPE: ICD-10-CM

## 2025-03-18 DIAGNOSIS — I95.1 ORTHOSTASIS: Primary | ICD-10-CM

## 2025-03-18 DIAGNOSIS — R11.0 NAUSEA: ICD-10-CM

## 2025-03-18 DIAGNOSIS — R07.9 CHEST PAIN: ICD-10-CM

## 2025-03-18 DIAGNOSIS — R07.9 CHEST PAIN, UNSPECIFIED TYPE: Primary | ICD-10-CM

## 2025-03-18 LAB
ALBUMIN SERPL BCP-MCNC: 3.7 G/DL (ref 3.5–5.2)
ALP SERPL-CCNC: 152 U/L (ref 40–150)
ALT SERPL W/O P-5'-P-CCNC: 39 U/L (ref 10–44)
ANION GAP SERPL CALC-SCNC: 13 MMOL/L (ref 8–16)
APTT PPP: 28.8 SEC (ref 21–32)
AST SERPL-CCNC: 24 U/L (ref 10–40)
BASOPHILS # BLD AUTO: 0.06 K/UL (ref 0–0.2)
BASOPHILS NFR BLD: 0.4 % (ref 0–1.9)
BILIRUB SERPL-MCNC: 0.4 MG/DL (ref 0.1–1)
BILIRUB UR QL STRIP: NEGATIVE
BNP SERPL-MCNC: <10 PG/ML (ref 0–99)
BUN SERPL-MCNC: 14 MG/DL (ref 8–23)
CALCIUM SERPL-MCNC: 9.3 MG/DL (ref 8.7–10.5)
CHLORIDE SERPL-SCNC: 103 MMOL/L (ref 95–110)
CLARITY UR: CLEAR
CO2 SERPL-SCNC: 21 MMOL/L (ref 23–29)
COLOR UR: COLORLESS
CREAT SERPL-MCNC: 1.2 MG/DL (ref 0.5–1.4)
DIFFERENTIAL METHOD BLD: ABNORMAL
EOSINOPHIL # BLD AUTO: 0.3 K/UL (ref 0–0.5)
EOSINOPHIL NFR BLD: 1.9 % (ref 0–8)
ERYTHROCYTE [DISTWIDTH] IN BLOOD BY AUTOMATED COUNT: 15.5 % (ref 11.5–14.5)
EST. GFR  (NO RACE VARIABLE): 52 ML/MIN/1.73 M^2
GLUCOSE SERPL-MCNC: 112 MG/DL (ref 70–110)
GLUCOSE SERPL-MCNC: 193 MG/DL (ref 70–110)
GLUCOSE UR QL STRIP: NEGATIVE
HCT VFR BLD AUTO: 40.8 % (ref 37–48.5)
HGB BLD-MCNC: 13.4 G/DL (ref 12–16)
HGB UR QL STRIP: NEGATIVE
IMM GRANULOCYTES # BLD AUTO: 0.07 K/UL (ref 0–0.04)
IMM GRANULOCYTES NFR BLD AUTO: 0.5 % (ref 0–0.5)
INR PPP: 1 (ref 0.8–1.2)
KETONES UR QL STRIP: NEGATIVE
LEUKOCYTE ESTERASE UR QL STRIP: NEGATIVE
LYMPHOCYTES # BLD AUTO: 3 K/UL (ref 1–4.8)
LYMPHOCYTES NFR BLD: 22 % (ref 18–48)
MAGNESIUM SERPL-MCNC: 1.7 MG/DL (ref 1.6–2.6)
MCH RBC QN AUTO: 27.1 PG (ref 27–31)
MCHC RBC AUTO-ENTMCNC: 32.8 G/DL (ref 32–36)
MCV RBC AUTO: 82 FL (ref 82–98)
MONOCYTES # BLD AUTO: 1.1 K/UL (ref 0.3–1)
MONOCYTES NFR BLD: 8.4 % (ref 4–15)
NEUTROPHILS # BLD AUTO: 9 K/UL (ref 1.8–7.7)
NEUTROPHILS NFR BLD: 66.8 % (ref 38–73)
NITRITE UR QL STRIP: NEGATIVE
NRBC BLD-RTO: 0 /100 WBC
OHS QRS DURATION: 76 MS
OHS QTC CALCULATION: 425 MS
PH UR STRIP: 5 [PH] (ref 5–8)
PLATELET # BLD AUTO: 296 K/UL (ref 150–450)
PLATELET BLD QL SMEAR: ABNORMAL
PMV BLD AUTO: 9.8 FL (ref 9.2–12.9)
POTASSIUM SERPL-SCNC: 4 MMOL/L (ref 3.5–5.1)
PROT SERPL-MCNC: 7.7 G/DL (ref 6–8.4)
PROT UR QL STRIP: NEGATIVE
PROTHROMBIN TIME: 10.9 SEC (ref 9–12.5)
RBC # BLD AUTO: 4.95 M/UL (ref 4–5.4)
SODIUM SERPL-SCNC: 137 MMOL/L (ref 136–145)
SP GR UR STRIP: 1.01 (ref 1–1.03)
TACROLIMUS BLD-MCNC: 4.5 NG/ML (ref 5–15)
TROPONIN I SERPL DL<=0.01 NG/ML-MCNC: <0.006 NG/ML (ref 0–0.03)
TROPONIN I SERPL DL<=0.01 NG/ML-MCNC: <0.006 NG/ML (ref 0–0.03)
URN SPEC COLLECT METH UR: ABNORMAL
UROBILINOGEN UR STRIP-ACNC: NEGATIVE EU/DL
WBC # BLD AUTO: 13.43 K/UL (ref 3.9–12.7)

## 2025-03-18 PROCEDURE — 1160F RVW MEDS BY RX/DR IN RCRD: CPT | Mod: CPTII,,, | Performed by: NURSE PRACTITIONER

## 2025-03-18 PROCEDURE — 83735 ASSAY OF MAGNESIUM: CPT | Performed by: EMERGENCY MEDICINE

## 2025-03-18 PROCEDURE — 99999PBSHW POCT GLUCOSE, HAND-HELD DEVICE: Mod: PBBFAC,,,

## 2025-03-18 PROCEDURE — 99215 OFFICE O/P EST HI 40 MIN: CPT | Mod: PBBFAC | Performed by: NURSE PRACTITIONER

## 2025-03-18 PROCEDURE — 93010 ELECTROCARDIOGRAM REPORT: CPT | Mod: ,,, | Performed by: INTERNAL MEDICINE

## 2025-03-18 PROCEDURE — 99999 PR PBB SHADOW E&M-EST. PATIENT-LVL V: CPT | Mod: PBBFAC,,, | Performed by: NURSE PRACTITIONER

## 2025-03-18 PROCEDURE — 3008F BODY MASS INDEX DOCD: CPT | Mod: CPTII,,, | Performed by: NURSE PRACTITIONER

## 2025-03-18 PROCEDURE — 93005 ELECTROCARDIOGRAM TRACING: CPT

## 2025-03-18 PROCEDURE — 85730 THROMBOPLASTIN TIME PARTIAL: CPT | Performed by: EMERGENCY MEDICINE

## 2025-03-18 PROCEDURE — 82962 GLUCOSE BLOOD TEST: CPT | Mod: PBBFAC | Performed by: NURSE PRACTITIONER

## 2025-03-18 PROCEDURE — 80053 COMPREHEN METABOLIC PANEL: CPT | Performed by: EMERGENCY MEDICINE

## 2025-03-18 PROCEDURE — 85610 PROTHROMBIN TIME: CPT | Performed by: EMERGENCY MEDICINE

## 2025-03-18 PROCEDURE — 99285 EMERGENCY DEPT VISIT HI MDM: CPT | Mod: 25,27

## 2025-03-18 PROCEDURE — 84484 ASSAY OF TROPONIN QUANT: CPT | Performed by: EMERGENCY MEDICINE

## 2025-03-18 PROCEDURE — 1159F MED LIST DOCD IN RCRD: CPT | Mod: CPTII,,, | Performed by: NURSE PRACTITIONER

## 2025-03-18 PROCEDURE — 96360 HYDRATION IV INFUSION INIT: CPT

## 2025-03-18 PROCEDURE — 3044F HG A1C LEVEL LT 7.0%: CPT | Mod: CPTII,,, | Performed by: NURSE PRACTITIONER

## 2025-03-18 PROCEDURE — 85025 COMPLETE CBC W/AUTO DIFF WBC: CPT | Performed by: EMERGENCY MEDICINE

## 2025-03-18 PROCEDURE — 96361 HYDRATE IV INFUSION ADD-ON: CPT

## 2025-03-18 PROCEDURE — 4010F ACE/ARB THERAPY RXD/TAKEN: CPT | Mod: CPTII,,, | Performed by: NURSE PRACTITIONER

## 2025-03-18 PROCEDURE — 3074F SYST BP LT 130 MM HG: CPT | Mod: CPTII,,, | Performed by: NURSE PRACTITIONER

## 2025-03-18 PROCEDURE — 81003 URINALYSIS AUTO W/O SCOPE: CPT | Performed by: EMERGENCY MEDICINE

## 2025-03-18 PROCEDURE — 99215 OFFICE O/P EST HI 40 MIN: CPT | Mod: S$PBB,,, | Performed by: NURSE PRACTITIONER

## 2025-03-18 PROCEDURE — 3078F DIAST BP <80 MM HG: CPT | Mod: CPTII,,, | Performed by: NURSE PRACTITIONER

## 2025-03-18 PROCEDURE — G2211 COMPLEX E/M VISIT ADD ON: HCPCS | Mod: S$PBB,,, | Performed by: NURSE PRACTITIONER

## 2025-03-18 PROCEDURE — 25000003 PHARM REV CODE 250: Performed by: EMERGENCY MEDICINE

## 2025-03-18 PROCEDURE — 83880 ASSAY OF NATRIURETIC PEPTIDE: CPT | Performed by: EMERGENCY MEDICINE

## 2025-03-18 RX ORDER — BUTALBITAL, ACETAMINOPHEN AND CAFFEINE 50; 325; 40 MG/1; MG/1; MG/1
1 TABLET ORAL
Status: COMPLETED | OUTPATIENT
Start: 2025-03-18 | End: 2025-03-18

## 2025-03-18 RX ADMIN — BUTALBITAL, ACETAMINOPHEN, AND CAFFEINE 1 TABLET: 325; 50; 40 TABLET ORAL at 12:03

## 2025-03-18 RX ADMIN — SODIUM CHLORIDE 500 ML: 9 INJECTION, SOLUTION INTRAVENOUS at 03:03
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
## 2025-03-20 ENCOUNTER — HOSPITAL ENCOUNTER (OUTPATIENT)
Dept: CARDIOLOGY | Facility: HOSPITAL | Age: 60
Discharge: HOME | End: 2025-03-20
Payer: MEDICAID

## 2025-03-20 ENCOUNTER — OFFICE VISIT (OUTPATIENT)
Dept: CARDIOLOGY | Facility: CLINIC | Age: 60
End: 2025-03-20
Payer: MEDICAID

## 2025-03-20 VITALS
DIASTOLIC BLOOD PRESSURE: 72 MMHG | BODY MASS INDEX: 41.41 KG/M2 | HEIGHT: 63 IN | HEART RATE: 102 BPM | SYSTOLIC BLOOD PRESSURE: 118 MMHG | WEIGHT: 233.69 LBS

## 2025-03-20 DIAGNOSIS — I10 ESSENTIAL HYPERTENSION: Chronic | ICD-10-CM

## 2025-03-20 DIAGNOSIS — I10 ESSENTIAL HYPERTENSION: Primary | Chronic | ICD-10-CM

## 2025-03-20 DIAGNOSIS — E78.2 MIXED HYPERLIPIDEMIA: ICD-10-CM

## 2025-03-20 DIAGNOSIS — G47.33 OSA ON CPAP: Chronic | ICD-10-CM

## 2025-03-20 DIAGNOSIS — R55 SYNCOPE AND COLLAPSE: Primary | ICD-10-CM

## 2025-03-20 LAB
OHS QRS DURATION: 74 MS
OHS QTC CALCULATION: 451 MS

## 2025-03-20 PROCEDURE — 3074F SYST BP LT 130 MM HG: CPT | Mod: CPTII,,,

## 2025-03-20 PROCEDURE — 99214 OFFICE O/P EST MOD 30 MIN: CPT | Mod: S$PBB,,,

## 2025-03-20 PROCEDURE — 1160F RVW MEDS BY RX/DR IN RCRD: CPT | Mod: CPTII,,,

## 2025-03-20 PROCEDURE — 3044F HG A1C LEVEL LT 7.0%: CPT | Mod: CPTII,,,

## 2025-03-20 PROCEDURE — 99999 PR PBB SHADOW E&M-EST. PATIENT-LVL III: CPT | Mod: PBBFAC,,,

## 2025-03-20 PROCEDURE — 4010F ACE/ARB THERAPY RXD/TAKEN: CPT | Mod: CPTII,,,

## 2025-03-20 PROCEDURE — 93010 ELECTROCARDIOGRAM REPORT: CPT | Mod: ,,, | Performed by: INTERNAL MEDICINE

## 2025-03-20 PROCEDURE — 3078F DIAST BP <80 MM HG: CPT | Mod: CPTII,,,

## 2025-03-20 PROCEDURE — 3008F BODY MASS INDEX DOCD: CPT | Mod: CPTII,,,

## 2025-03-20 PROCEDURE — 1159F MED LIST DOCD IN RCRD: CPT | Mod: CPTII,,,

## 2025-03-20 PROCEDURE — 99213 OFFICE O/P EST LOW 20 MIN: CPT | Mod: PBBFAC,PO

## 2025-03-20 PROCEDURE — 93005 ELECTROCARDIOGRAM TRACING: CPT | Mod: PO

## 2025-03-20 NOTE — PROGRESS NOTES
Subjective:   Patient ID:  Marcie Bazan is a 59 y.o. female who presents for evaluation of No chief complaint on file.      HPI 59-year-old female whose current medical conditions include HTN, HLD, h/o liver transplant - '2017, h/o COVID 19, chronic back/knee pain, BRANNON, obesity, FE def anemia followed in cardiology clinic by Dr. Wick here today for CV follow-up c/o JADE, feels she can't walk anywhere without feeling SOB, ED visit this week for CP and syncope last week. Cardiac work up unremarkable. Appear very uncomfortable in clinic today cannot sit still. ST on exam. Has back procedure planned to help with pain    EKG today SR    LDL 66 24'  HGA1C 5.9 2/25'    ETOH used to  Tobacco none  Exercise none  FH dad CAD, sister CAD    Nuc stress 10/2024 neg for ischemia.   ECHO 10/2024 with normal bi V function, mild TR, PASP 43 mmHg.   Past Medical History:   Diagnosis Date    Alcohol abuse     Alcoholic hepatitis     Anemia of chronic disease     Arthritis     generialized    Cancer 12/26/2017    liver    Coagulopathy     Dyspnea on exertion 3/26/2020    Encounter for blood transfusion     End stage liver disease     History of alcohol abuse     Hyperlipidemia     Hypersomnia 9/11/2020    Hypertension     Hyponatremia     Liver cirrhosis     Liver transplant candidate 12/26/2017    Obesity (BMI 30-39.9) 1/5/2016    Prediabetes 4/22/2023    Sleep apnea        Past Surgical History:   Procedure Laterality Date    BILATERAL SALPINGO-OOPHORECTOMY (BSO) Bilateral 1986    with hysterectomy done for abnormal cells    BREAST BIOPSY Left     benign    BREAST LUMPECTOMY      patient is unsure of date or laterality    CATARACT EXTRACTION W/  INTRAOCULAR LENS IMPLANT Right 9/20/2023    Procedure: EXTRACTION, CATARACT, WITH IOL INSERTION;  Surgeon: Kim Mullen MD;  Location: [facility];  Service: Ophthalmology;  Laterality: Right;  Per Elidia Viveros change patient from left eye to right on 9/11 @ 2:38p    CATARACT EXTRACTION  W/  INTRAOCULAR LENS IMPLANT Left 10/11/2023    Procedure: EXTRACTION, CATARACT, WITH IOL INSERTION;  Surgeon: Kim Mullen MD;  Location: [facility] OR;  Service: Ophthalmology;  Laterality: Left;  Per Idalia Rosa change the patient to Left and to 10/11 on 9/8 @8:49Am    CHOLECYSTECTOMY      COLONOSCOPY N/A 12/01/2017    Procedure: COLONOSCOPY;  Surgeon: Filemon Fuentes MD;  Location: [facility] (2ND FLR);  Service: Endoscopy;  Laterality: N/A;    COLONOSCOPY N/A 12/05/2017    Procedure: COLONOSCOPY;  Surgeon: José Chavira MD;  Location: [facility] ENDO (2ND FLR);  Service: Endoscopy;  Laterality: N/A;    COLONOSCOPY N/A 12/12/2022    Procedure: COLONOSCOPY;  Surgeon: Gogo Brown MD;  Location: [facility] ENDO;  Service: Endoscopy;  Laterality: N/A;    EPIDURAL STEROID INJECTION N/A 2/28/2023    Procedure: L4/5 IL WILBERTO (with right paramedian approach);  Surgeon: Ras Caballero MD;  Location: [facility] PAIN MGT;  Service: Pain Management;  Laterality: N/A;    EPIDURAL STEROID INJECTION INTO CERVICAL SPINE N/A 01/04/2022    Procedure: C5/6 IL WILBERTO;  Surgeon: Ras Caballero MD;  Location: [facility] PAIN MGT;  Service: Pain Management;  Laterality: N/A;    ESOPHAGOGASTRODUODENOSCOPY N/A 9/14/2023    Procedure: EGD (ESOPHAGOGASTRODUODENOSCOPY);  Surgeon: Angie Zimmerman MD;  Location: [facility] ENDO;  Service: Endoscopy;  Laterality: N/A;    EXAMINATION UNDER ANESTHESIA N/A 02/18/2020    Procedure: EXAM UNDER ANESTHESIA;  Surgeon: Alden Horowitz MD;  Location: [facility] OR;  Service: General;  Laterality: N/A;    EXCISIONAL HEMORRHOIDECTOMY N/A 02/18/2020    Procedure: HEMORRHOIDECTOMY;  Surgeon: Alden Horowitz MD;  Location: [facility] OR;  Service: General;  Laterality: N/A;    HYSTERECTOMY  1986    due to abnormal paps - ovaries were removed    INJECTION OF ANESTHETIC AGENT AROUND PUDENDAL NERVE N/A 02/18/2020    Procedure: BLOCK, NERVE, PUDENDAL;  Surgeon: Alden Horowitz MD;  Location: [facility];  Service: General;   Laterality: N/A;    INJECTION OF ANESTHETIC AGENT INTO SACROILIAC JOINT Right 06/14/2019    Procedure: right Sacroiliac Joint Injection;  Surgeon: David Desai MD;  Location: [facility] PAIN MGT;  Service: Pain Management;  Laterality: Right;    INJECTION OF ANESTHETIC AGENT INTO SACROILIAC JOINT Bilateral 02/07/2020    Procedure: Bilateral GT bursa + Bilateral Sacroiliac Joint Injection;  Surgeon: David Desai MD;  [facility];  Service: Pain Management;  Laterality: Bilateral;    INJECTION OF ANESTHETIC AGENT INTO SACROILIAC JOINT Bilateral 07/07/2020    Procedure: Bilateral GT bursa +SIJ injection;  Surgeon: David Desai MD;  Location: [facility] PAIN [facility]GT;  Service: Pain Management;  Laterality: Bilateral;    INJECTION OF ANESTHETIC AGENT INTO SACROILIAC JOINT Right 10/21/2020    Procedure: Right piriformis + right SIJ + right GT bursa injection;  Surgeon: David Desai MD;  Location: [facility] PAIN MGT;  Service: Pain Management;  Laterality: Right;    INJECTION OF ANESTHETIC AGENT INTO SACROILIAC JOINT Bilateral 04/15/2021    Procedure: Bilateral SIJ + Bilateral Piriformis + Bilateral GT Bursa Injection;  Surgeon: Flaco Frazier MD;  Location: [facility] PAIN MGT;  Service: Pain Management;  Laterality: Bilateral;    INJECTION OF ANESTHETIC AGENT INTO SACROILIAC JOINT Bilateral 04/21/2022    Procedure: Bilateral SIJ + Bilateral Piriformis + Bilateral GT Bursa Injection;  Surgeon: Ras Caballero MD;  Location: [facility] PAIN MGT;  Service: Pain Management;  Laterality: Bilateral;    INJECTION OF ANESTHETIC AGENT INTO SACROILIAC JOINT Right 4/18/2023    Procedure: Right SIJ + Right piriformis +/- Right GT bursa injection;  Surgeon: Ras Caballero MD;  Location: [facility];  Service: Pain Management;  Laterality: Right;    INJECTION OF JOINT Bilateral 02/07/2020    Procedure: Bilateral GT bursa + Bilateral Sacroiliac Joint Injection;  Surgeon: David Desai MD;  Location: [facility];  Service: Pain  Management;  Laterality: Bilateral;    INJECTION OF JOINT Bilateral 07/07/2020    Procedure: Bilateral GT bursa +SIJ injection;  Surgeon: David Desai MD;  Location: [facility] PAIN MGT;  Service: Pain Management;  Laterality: Bilateral;    INJECTION OF JOINT Right 10/21/2020    Procedure: Right piriformis + right SIJ + right GT bursa injection;  Surgeon: David Desai MD;  Location: [facility] PAIN MGT;  Service: Pain Management;  Laterality: Right;    INJECTION OF JOINT Bilateral 02/08/2021    Procedure: Bilateral GT Bursa Injection;  Surgeon: Anna Kaminski MD;  Location: [facility] PAIN MGT;  Service: Pain Management;  Laterality: Bilateral;    INJECTION OF JOINT Bilateral 04/15/2021    Procedure: Bilateral SIJ + Bilateral Piriformis + Bilateral GT Bursa Injection;  Surgeon: Flaco Frazier MD;  Location: [facility] PAIN MGT;  Service: Pain Management;  Laterality: Bilateral;    INJECTION OF JOINT Bilateral 04/21/2022    Procedure: Bilateral SIJ + Bilateral Piriformis + Bilateral GT Bursa Injection;  Surgeon: Ras Caballero MD;  Location: [facility] PAIN MGT;  Service: Pain Management;  Laterality: Bilateral;    INJECTION OF JOINT Right 10/11/2022    Procedure: right SIJ + right piriformis + right GT bursa injection;  Surgeon: Ras Caballero MD;  Location: [facility] PAIN MGT;  Service: Pain Management;  Laterality: Right;    INJECTION OF PIRIFORMIS MUSCLE Right 06/14/2019    Procedure: Right piriformis injection;  Surgeon: David Desai MD;  Location: [facility] PAIN MGT;  Service: Pain Management;  Laterality: Right;    INJECTION OF PIRIFORMIS MUSCLE Right 10/21/2020    Procedure: Right piriformis + right SIJ + right GT bursa injection;  Surgeon: David Desai MD;  Location: [facility] PAIN MGT;  Service: Pain Management;  Laterality: Right;    INJECTION OF PIRIFORMIS MUSCLE Bilateral 04/15/2021    Procedure: Bilateral SIJ + Bilateral Piriformis + Bilateral GT Bursa Injection;  Surgeon: Flaco Frazier MD;  Location: [facility] PAIN MGT;   Service: Pain Management;  Laterality: Bilateral;    INJECTION OF PIRIFORMIS MUSCLE Bilateral 04/21/2022    Procedure: Bilateral SIJ + Bilateral Piriformis + Bilateral GT Bursa Injection;  Surgeon: Ras Caballero MD;  Location: [facility] PAIN MGT;  Service: Pain Management;  Laterality: Bilateral;    LIVER TRANSPLANT  12/2017    SELECTIVE INJECTION OF ANESTHETIC AGENT AROUND LUMBAR SPINAL NERVE ROOT BY TRANSFORAMINAL APPROACH Bilateral 10/28/2021    Procedure: BLOCK, SPINAL NERVE ROOT, LUMBAR, SELECTIVE, TRANSFORAMINAL APPROACH bilateral L4-5 and Right Knee injection with RN IV sedation;  Surgeon: Flaco Frazier MD;  Location: [facility] PAIN MGT;  Service: Pain Management;  Laterality: Bilateral;    SELECTIVE INJECTION OF ANESTHETIC AGENT AROUND LUMBAR SPINAL NERVE ROOT BY TRANSFORAMINAL APPROACH Bilateral 08/18/2022    Procedure: Bilateral L4/5 TF WILBERTO;  Surgeon: Ras Caballero MD;  Location: [facility] PAIN MGT;  Service: Pain Management;  Laterality: Bilateral;    TRANSFORAMINAL EPIDURAL INJECTION OF STEROID Right 8/15/2023    Procedure: right L5/S1 + S1 TF WILBERTO;  Surgeon: Ras Caballero MD;  Location: [facility] PAIN MGT;  Service: Pain Management;  Laterality: Right;       Social History[1]    Family History   Problem Relation Name Age of Onset    Hypertension Mother      Alzheimer's disease Mother      Hypertension Father      Diabetes Father      Diabetes Sister      Ovarian cancer Paternal Aunt      Depression Maternal Grandfather         Medications Ordered Prior to Encounter[2]   Wt Readings from Last 3 Encounters:   03/18/25 105.1 kg (231 lb 11.3 oz)   02/25/25 106.6 kg (235 lb 0.2 oz)   02/07/25 106.2 kg (234 lb 2.1 oz)     Temp Readings from Last 3 Encounters:   03/18/25 98.5 °F (36.9 °C) (Oral)   03/18/25 97.3 °F (36.3 °C) (Tympanic)   02/25/25 97.5 °F (36.4 °C) (Tympanic)     BP Readings from Last 3 Encounters:   03/18/25 (!) 128/56   03/18/25 108/64   02/25/25 130/78     Pulse Readings from Last 3 Encounters:    03/18/25 79   03/18/25 109   02/25/25 107        Review of Systems   Constitutional: Negative.   HENT: Negative.     Eyes: Negative.    Cardiovascular:  Positive for dyspnea on exertion and syncope.   Respiratory:  Positive for shortness of breath.    Skin: Negative.    Musculoskeletal:  Positive for arthritis, back pain and neck pain.   Gastrointestinal: Negative.    Genitourinary: Negative.    Psychiatric/Behavioral:  The patient is nervous/anxious.        Objective:   Physical Exam  Vitals and nursing note reviewed.   Constitutional:       Appearance: Normal appearance. She is obese.   HENT:      Head: Normocephalic.   Eyes:      Pupils: Pupils are equal, round, and reactive to light.   Cardiovascular:      Rate and Rhythm: Regular rhythm. Tachycardia present.      Heart sounds: Normal heart sounds, S1 normal and S2 normal. No murmur heard.     No S3 or S4 sounds.   Pulmonary:      Effort: Pulmonary effort is normal.      Breath sounds: Normal breath sounds.   Abdominal:      General: Bowel sounds are normal.      Palpations: Abdomen is soft.   Musculoskeletal:         General: Normal range of motion.      Cervical back: Normal range of motion.   Skin:     Capillary Refill: Capillary refill takes less than 2 seconds.   Neurological:      General: No focal deficit present.      Mental Status: She is alert and oriented to person, place, and time.   Psychiatric:         Mood and Affect: Mood is anxious.         Behavior: Behavior normal.         Thought Content: Thought content normal.         Lab Results   Component Value Date    CHOL 127 04/17/2023    CHOL 128 12/13/2021    CHOL 129 04/27/2020     Lab Results   Component Value Date    HDL 40 04/17/2023    HDL 37 (L) 12/13/2021    HDL 34 (L) 04/27/2020     Lab Results   Component Value Date    LDLCALC 66.2 04/17/2023    LDLCALC 55.2 (L) 12/13/2021    LDLCALC 69.4 04/27/2020     Lab Results   Component Value Date    TRIG 104 04/17/2023    TRIG 179 (H) 12/13/2021     TRIG 128 04/27/2020     Lab Results   Component Value Date    CHOLHDL 31.5 04/17/2023    CHOLHDL 28.9 12/13/2021    CHOLHDL 26.4 04/27/2020       Chemistry        Component Value Date/Time     03/18/2025 1232    K 4.0 03/18/2025 1232     03/18/2025 1232    CO2 21 (L) 03/18/2025 1232    BUN 14 03/18/2025 1232    CREATININE 1.2 03/18/2025 1232     (H) 03/18/2025 1232        Component Value Date/Time    CALCIUM 9.3 03/18/2025 1232    ALKPHOS 152 (H) 03/18/2025 1232    AST 24 03/18/2025 1232    ALT 39 03/18/2025 1232    BILITOT 0.4 03/18/2025 1232    ESTGFRAFRICA >60 07/26/2022 0856    EGFRNONAA >60 07/26/2022 0856          Lab Results   Component Value Date    TSH 1.157 01/31/2024     Lab Results   Component Value Date    INR 1.0 03/18/2025    INR 1.0 02/27/2020    INR 1.0 03/09/2018     @RESUFAST(WBC,HGB,HCT,MCV,PLT)  @LABRCNTIP(BNP,BNPTRIAGEBLO)@  CrCl cannot be calculated (Unknown ideal weight.).     Results for orders placed during the hospital encounter of 10/31/24    Echo    Interpretation Summary    Left Ventricle: The left ventricle is normal in size. Ventricular mass is normal. Normal wall thickness. There is concentric remodeling. There is normal systolic function with a visually estimated ejection fraction of 55 - 60%. There is normal diastolic function.    Right Ventricle: Normal right ventricular cavity size. Wall thickness is normal. Systolic function is normal.    Tricuspid Valve: There is mild regurgitation.    Pulmonary Artery: The estimated pulmonary artery systolic pressure is 43 mmHg.    IVC/SVC: Normal venous pressure at 3 mmHg.     Results for orders placed during the hospital encounter of 10/31/24    Nuclear Stress - Cardiology Interpreted    Interpretation Summary    Normal myocardial perfusion scan. There is no evidence of myocardial ischemia or infarction. TID is 1.20    The gated perfusion images showed an ejection fraction of 79% at rest. The gated perfusion images  showed an ejection fraction of 79% post stress. Normal ejection fraction is greater than 59%.    The ECG portion of the study is negative for ischemia.     Assessment:      1. Essential hypertension    2. Mixed hyperlipidemia    3. BRANNON on CPAP        Plan:   Essential hypertension    Mixed hyperlipidemia    BRANNON on CPAP        Lasix, nifedipine, aldactone, valsartan- low Na diet profile BP- HTN  CPAP- BRANNON  Statin, low fat diet- Hlp  RF modifications  Daily exercise as tolerated    3 day vital  RTC 2m or sooner if needed      Courtney Guillot, FNP-C [facility], Cardiology         [1]   Social History  Tobacco Use    Smoking status: Never    Smokeless tobacco: Never   Substance Use Topics    Alcohol use: No     Comment: Currently admitted to inpatient rehab 7/2017    Drug use: No   [2]   Current Outpatient Medications on File Prior to Visit   Medication Sig Dispense Refill    amitriptyline (ELAVIL) 25 MG tablet TAKE 1 TO 2 TABLETS(25 TO 50 MG) BY MOUTH EVERY NIGHT AS NEEDED FOR INSOMNIA OR PAIN 60 tablet 5    atorvastatin (LIPITOR) 40 MG tablet Take 1 tablet (40 mg total) by mouth every evening. 90 tablet 0    cyclobenzaprine (FLEXERIL) 10 MG tablet TAKE 1/2 TO 1 TABLET BY MOUTH THREE TIMES DAILY AS NEEDED FOR MUSCLE SPASMS 90 tablet 5    DULoxetine (CYMBALTA) 60 MG capsule Take 1 capsule (60 mg total) by mouth once daily. 30 capsule 5    ergocalciferol (ERGOCALCIFEROL) 50,000 unit Cap Take 1 capsule (50,000 Units total) by mouth every 7 days. 12 capsule 3    famotidine (PEPCID) 20 MG tablet Take 1 tablet (20 mg total) by mouth 2 (two) times daily. 180 tablet 3    ferrous sulfate (FEOSOL) 325 mg (65 mg iron) Tab tablet Take 1 tablet (325 mg total) by mouth daily with breakfast. 90 tablet 1    folic acid (FOLVITE) 1 MG tablet Take 1 tablet (1 mg total) by mouth once daily. 90 tablet 3    furosemide (LASIX) 40 MG tablet Take 1 tablet (40 mg total) by mouth once daily. 90 tablet 3    levocetirizine (XYZAL) 5 MG tablet Take  1 tablet (5 mg total) by mouth every evening. 30 tablet 3    magnesium oxide (MAG-OX) 400 mg (241.3 mg magnesium) tablet Take 1 tablet (400 mg total) by mouth once daily. 90 tablet 3    NIFEdipine (PROCARDIA-XL) 60 MG (OSM) 24 hr tablet Take 1 tablet (60 mg total) by mouth once daily. 90 tablet 3    nitroGLYCERIN (NITROSTAT) 0.4 MG SL tablet Place 1 tablet (0.4 mg total) under the tongue every 5 (five) minutes as needed for Chest pain. 30 tablet 0    pantoprazole (PROTONIX) 40 MG tablet Take 1 tablet (40 mg total) by mouth 2 (two) times daily. 180 tablet 1    potassium chloride (KLOR-CON) 10 MEQ TbSR Take 1 tablet (10 mEq total) by mouth 2 (two) times daily. 180 tablet 3    pregabalin (LYRICA) 225 MG Cap TAKE 1 CAPSULE BY MOUTH TWICE DAILY 60 capsule 0    spironolactone (ALDACTONE) 50 MG tablet Take 1 tablet (50 mg total) by mouth once daily. 90 tablet 1    tacrolimus (PROGRAF) 0.5 MG Cap Take 3 capsules (1.5 mg total) by mouth every morning AND 3 capsules (1.5 mg total) every evening. 180 capsule 11    valsartan (DIOVAN) 80 MG tablet Take 1 tablet (80 mg total) by mouth once daily. Stop losartan 90 tablet 1    COLLAGEN-BIOTIN-ASCORBIC ACID ORAL Take by mouth. (Patient not taking: Reported on 3/20/2025)      diclofenac sodium (VOLTAREN) 1 % Gel Apply to painful joint area four times a day as needed (Patient not taking: Reported on 3/20/2025) 200 g 5    fluticasone propionate (FLONASE) 50 mcg/actuation nasal spray 2 sprays (100 mcg total) by Each Nostril route once daily. (Patient not taking: Reported on 3/20/2025) 16 g 11     No current facility-administered medications on file prior to visit.

## 2025-03-24 ENCOUNTER — HOSPITAL ENCOUNTER (OUTPATIENT)
Dept: CARDIOLOGY | Facility: HOSPITAL | Age: 60
Discharge: HOME | End: 2025-03-24
Payer: MEDICAID

## 2025-03-24 ENCOUNTER — TELEPHONE (OUTPATIENT)
Dept: TRANSPLANT | Facility: CLINIC | Age: 60
End: 2025-03-24
Payer: MEDICAID

## 2025-03-24 DIAGNOSIS — R55 SYNCOPE AND COLLAPSE: ICD-10-CM

## 2025-03-24 NOTE — TELEPHONE ENCOUNTER
Patient calling for clarification on new Prograf dose.  Confirmed her dose is 1.5 mg by mouth twice daily.  Patient states she was confused and thought that was added to her old dose. Explained NO.  Stop old dose and begin taking new dose of 1.5 mg (3 0.5 mg capsules twice daily).  Patient verbalized understanding.

## 2025-03-25 ENCOUNTER — OFFICE VISIT (OUTPATIENT)
Dept: PULMONOLOGY | Facility: CLINIC | Age: 60
End: 2025-03-25
Payer: MEDICAID

## 2025-03-25 VITALS
HEIGHT: 65 IN | OXYGEN SATURATION: 97 % | SYSTOLIC BLOOD PRESSURE: 148 MMHG | BODY MASS INDEX: 38.89 KG/M2 | DIASTOLIC BLOOD PRESSURE: 100 MMHG | HEART RATE: 92 BPM | RESPIRATION RATE: 22 BRPM | WEIGHT: 233.44 LBS

## 2025-03-25 DIAGNOSIS — R06.09 DYSPNEA ON EXERTION: ICD-10-CM

## 2025-03-25 DIAGNOSIS — R06.09 DOE (DYSPNEA ON EXERTION): ICD-10-CM

## 2025-03-25 DIAGNOSIS — G47.33 OSA ON CPAP: Chronic | ICD-10-CM

## 2025-03-25 DIAGNOSIS — D63.8 ANEMIA OF CHRONIC DISEASE: Primary | ICD-10-CM

## 2025-03-25 PROCEDURE — 3077F SYST BP >= 140 MM HG: CPT | Mod: CPTII,,, | Performed by: HOSPITALIST

## 2025-03-25 PROCEDURE — 4010F ACE/ARB THERAPY RXD/TAKEN: CPT | Mod: CPTII,,, | Performed by: HOSPITALIST

## 2025-03-25 PROCEDURE — 1160F RVW MEDS BY RX/DR IN RCRD: CPT | Mod: CPTII,,, | Performed by: HOSPITALIST

## 2025-03-25 PROCEDURE — 3008F BODY MASS INDEX DOCD: CPT | Mod: CPTII,,, | Performed by: HOSPITALIST

## 2025-03-25 PROCEDURE — 99999 PR PBB SHADOW E&M-EST. PATIENT-LVL V: CPT | Mod: PBBFAC,,, | Performed by: HOSPITALIST

## 2025-03-25 PROCEDURE — 1159F MED LIST DOCD IN RCRD: CPT | Mod: CPTII,,, | Performed by: HOSPITALIST

## 2025-03-25 PROCEDURE — 3080F DIAST BP >= 90 MM HG: CPT | Mod: CPTII,,, | Performed by: HOSPITALIST

## 2025-03-25 PROCEDURE — 99215 OFFICE O/P EST HI 40 MIN: CPT | Mod: PBBFAC | Performed by: HOSPITALIST

## 2025-03-25 PROCEDURE — 99213 OFFICE O/P EST LOW 20 MIN: CPT | Mod: S$PBB,,, | Performed by: HOSPITALIST

## 2025-03-25 PROCEDURE — 3044F HG A1C LEVEL LT 7.0%: CPT | Mod: CPTII,,, | Performed by: HOSPITALIST

## 2025-03-25 NOTE — ASSESSMENT & PLAN NOTE
- needs to wear more  - discussed that machine can be used without water, discussed setting an alarm as a reminder to put on

## 2025-03-25 NOTE — PROGRESS NOTES
Subjective:      Patient ID: Marcie Bazan is a 59 y.o. female.    Chief Complaint: Sleep Apnea    Interval Hx 3/25/25:    Mrs. Bazan presents today for follow up sleep apnea and shortness of breath. She was last seen 8/2024- reported benefit with PAP.     Just not putting on machine. Got supplies not long ago. Seeing Neurosurgeon for procedure later this week, hopefully will help with pain. [place] 15. Taking naps. No weight loss, no hemoptysis    Drinks a lot of water. Will feel in pain or short of breath after standing and cooking for about 30 minutes.         HPI 8/13/24:     58 year old female with history of HTN, HLD, BECKA, obesity, hx H. Pylori, BRANNON who presents to Pulmonary clinic for follow up sleep apnea. She was last seen 5/2023 by Elizabeth Lejeune, NP- pt reported benefit with use.      Benefits from use, feels more awake during the day, sleeps deeper. Water in chamber running out in the night, darya her from using cpap at times.  No big medical changes since 5/2023. Has continued to have severe chronic pain, seeing Neurology.      Pertinent Work Up:  - [place] 14  - PSG 11/2020- AHI 8.8/hr    Review of Systems   Constitutional:  Negative for weight loss.   Respiratory:          JADE, daytime sleepiness       Objective:     Physical Exam  Constitutional:       General: She is not in acute distress.     Appearance: Normal appearance. She is obese.   Cardiovascular:      Rate and Rhythm: Normal rate and regular rhythm.   Pulmonary:      Effort: Pulmonary effort is normal.      Breath sounds: Normal breath sounds. No wheezing.   Skin:     General: Skin is warm and dry.   Neurological:      Mental Status: She is alert and oriented to person, place, and time.         Personal Diagnostic Review  A Above      2/25/2025    10:34 AM 2/7/2025     1:01 PM 12/31/2024     8:30 AM 11/12/2024     9:52 AM 10/31/2024    10:37 AM 10/31/2024     8:55 AM 10/15/2024     9:03 AM   Pulmonary Function Tests   SpO2 97 %  99 %  "97 %   97 %   Height 5' 3" (1.6 m) 5' 3" (1.6 m)  5' 3" (1.6 m) 5' 3" (1.6 m) 5' 3" (1.6 m) 5' 3" (1.6 m)   Weight 106.6 kg (235 lb 0.2 oz) 106.2 kg (234 lb 2.1 oz) 106.2 kg (234 lb 2.1 oz) 108.3 kg (238 lb 12.1 oz) 106.6 kg (235 lb) 106.6 kg (235 lb) 107 kg (235 lb 12.9 oz)   BMI (Calculated) 41.6 41.5  42.3 41.6 41.6 41.8        Assessment:     No diagnosis found.     [Encounter Medications]    [Encounter Medications]  Outpatient Encounter Medications as of 3/13/2025   Medication Sig Dispense Refill    amitriptyline (ELAVIL) 25 MG tablet TAKE 1 TO 2 TABLETS(25 TO 50 MG) BY MOUTH EVERY NIGHT AS NEEDED FOR INSOMNIA OR PAIN 60 tablet 5    atorvastatin (LIPITOR) 40 MG tablet Take 1 tablet (40 mg total) by mouth every evening. 90 tablet 0    COLLAGEN-BIOTIN-ASCORBIC ACID ORAL Take by mouth.      cyclobenzaprine (FLEXERIL) 10 MG tablet TAKE 1/2 TO 1 TABLET BY MOUTH THREE TIMES DAILY AS NEEDED FOR MUSCLE SPASMS 90 tablet 5    diclofenac sodium (VOLTAREN) 1 % Gel Apply to painful joint area four times a day as needed 200 g 5    DULoxetine (CYMBALTA) 60 MG capsule Take 1 capsule (60 mg total) by mouth once daily. 30 capsule 5    ergocalciferol (ERGOCALCIFEROL) 50,000 unit Cap Take 1 capsule (50,000 Units total) by mouth every 7 days. 12 capsule 3    famotidine (PEPCID) 20 MG tablet Take 1 tablet (20 mg total) by mouth 2 (two) times daily. 180 tablet 3    ferrous sulfate (FEOSOL) 325 mg (65 mg iron) Tab tablet Take 1 tablet (325 mg total) by mouth daily with breakfast. 90 tablet 1    fluticasone propionate (FLONASE) 50 mcg/actuation nasal spray 2 sprays (100 mcg total) by Each Nostril route once daily. 16 g 11    folic acid (FOLVITE) 1 MG tablet Take 1 tablet (1 mg total) by mouth once daily. 90 tablet 3    furosemide (LASIX) 40 MG tablet Take 1 tablet (40 mg total) by mouth once daily. 90 tablet 3    levocetirizine (XYZAL) 5 MG tablet Take 1 tablet (5 mg total) by mouth every evening. 30 tablet 3    magnesium oxide " (MAG-OX) 400 mg (241.3 mg magnesium) tablet Take 1 tablet (400 mg total) by mouth once daily. 90 tablet 3    NIFEdipine (PROCARDIA-XL) 60 MG (OSM) 24 hr tablet Take 1 tablet (60 mg total) by mouth once daily. 90 tablet 3    nitroGLYCERIN (NITROSTAT) 0.4 MG SL tablet Place 1 tablet (0.4 mg total) under the tongue every 5 (five) minutes as needed for Chest pain. 30 tablet 0    pantoprazole (PROTONIX) 40 MG tablet Take 1 tablet (40 mg total) by mouth 2 (two) times daily. 180 tablet 1    potassium chloride (KLOR-CON) 10 MEQ TbSR Take 1 tablet (10 mEq total) by mouth 2 (two) times daily. 180 tablet 3    pregabalin (LYRICA) 225 MG Cap TAKE 1 CAPSULE BY MOUTH TWICE DAILY 60 capsule 0    spironolactone (ALDACTONE) 50 MG tablet Take 1 tablet (50 mg total) by mouth once daily. 90 tablet 1    tacrolimus (PROGRAF) 0.5 MG Cap Take 3 capsules (1.5 mg total) by mouth every morning AND 3 capsules (1.5 mg total) every evening. 180 capsule 11    valsartan (DIOVAN) 80 MG tablet Take 1 tablet (80 mg total) by mouth once daily. Stop losartan 90 tablet 1    [DISCONTINUED] atorvastatin (LIPITOR) 40 MG tablet TAKE 1 TABLET(40 MG) BY MOUTH EVERY EVENING 90 tablet 0    [DISCONTINUED] DULoxetine (CYMBALTA) 60 MG capsule Take 1 capsule (60 mg total) by mouth once daily. 30 capsule 5    [DISCONTINUED] ergocalciferol (ERGOCALCIFEROL) 50,000 unit Cap Take 1 capsule (50,000 Units total) by mouth every 7 days. 12 capsule 3    [DISCONTINUED] famotidine (PEPCID) 20 MG tablet TAKE 1 TABLET(20 MG) BY MOUTH TWICE DAILY. 180 tablet 3    [DISCONTINUED] fluticasone propionate (FLONASE) 50 mcg/actuation nasal spray 2 sprays (100 mcg total) by Each Nostril route once daily. 16 g 11    [DISCONTINUED] folic acid (FOLVITE) 1 MG tablet Take 1 tablet (1 mg total) by mouth once daily. 90 tablet 3    [DISCONTINUED] levocetirizine (XYZAL) 5 MG tablet Take 1 tablet (5 mg total) by mouth every evening. 30 tablet 3    [DISCONTINUED] NIFEdipine (PROCARDIA-XL) 60 MG  (OSM) 24 hr tablet Take 1 tablet (60 mg total) by mouth once daily. 90 tablet 3    [DISCONTINUED] pantoprazole (PROTONIX) 40 MG tablet Take 1 tablet (40 mg total) by mouth 2 (two) times daily. 180 tablet 1    [DISCONTINUED] potassium chloride (KLOR-CON) 10 MEQ TbSR Take 1 tablet (10 mEq total) by mouth 2 (two) times daily. 180 tablet 3    [DISCONTINUED] pregabalin (LYRICA) 225 MG Cap TAKE 1 CAPSULE BY MOUTH TWICE DAILY 60 capsule 5    [DISCONTINUED] tacrolimus (PROGRAF) 1 MG Cap Take 1 capsule (1 mg total) by mouth every morning AND 1 capsule (1 mg total) every evening. 60 capsule 11    [DISCONTINUED] valsartan (DIOVAN) 80 MG tablet Take 1 tablet (80 mg total) by mouth once daily. Stop losartan 90 tablet 1     No facility-administered encounter medications on file as of 3/13/2025.     No orders of the defined types were placed in this encounter.      Plan:     Problem List Items Addressed This Visit       Anemia of chronic disease - Primary    - hx of iron infusions, was referred to see Hematology in January, but appt not made           Relevant Orders    Ambulatory referral/consult to Hematology / Oncology    JADE (dyspnea on exertion)    - further eval with leon and walk, pt to bring walker with her   - reports severe pain         BRANNON on CPAP (Chronic)    - needs to wear more  - discussed that machine can be used without water, discussed setting an alarm as a reminder to put on          Other Visit Diagnoses         Dyspnea on exertion        Relevant Orders    Spirometry with/without bronchodilator    Stress test, pulmonary          Leon and walk at patient convenience, otherwise follow up in one year for PAP review.

## 2025-03-27 ENCOUNTER — PATIENT MESSAGE (OUTPATIENT)
Dept: HEMATOLOGY/ONCOLOGY | Facility: CLINIC | Age: 60
End: 2025-03-27
Payer: MEDICAID

## 2025-04-01 ENCOUNTER — OFFICE VISIT (OUTPATIENT)
Dept: OPHTHALMOLOGY | Facility: CLINIC | Age: 60
End: 2025-04-01
Payer: MEDICAID

## 2025-04-01 DIAGNOSIS — Z96.1 PSEUDOPHAKIA OF BOTH EYES: Primary | ICD-10-CM

## 2025-04-01 DIAGNOSIS — H04.129 DRY EYE: ICD-10-CM

## 2025-04-01 DIAGNOSIS — H26.493 PCO (POSTERIOR CAPSULAR OPACIFICATION), BILATERAL: ICD-10-CM

## 2025-04-01 PROCEDURE — 99211 OFF/OP EST MAY X REQ PHY/QHP: CPT | Mod: PBBFAC,PO | Performed by: OPHTHALMOLOGY

## 2025-04-01 PROCEDURE — 99999 PR PBB SHADOW E&M-EST. PATIENT-LVL I: CPT | Mod: PBBFAC,,, | Performed by: OPHTHALMOLOGY

## 2025-04-01 NOTE — PROGRESS NOTES
HPI     Decreased Visual Acuity     Additional comments: Pt states she has decreased vision OU, would like an   updated glasses rx            Comments    Dry Eye   Additional comments: Pt states vision OU has fluctuated since last visit.     Notice teary/ watery eye. Pt would like to know if insurance covers gtts   rx if needed             Comments    Doing well OU  Mild PCO OU, YAG not yet indicated  Continue post op gtts as directed on post op sheet  New spec rx given, ok to c/w OTC readers  Eyeglass Final Rx                  Last edited by Ronnie Cunha on 6/13/2024 12:58 PM.                   Last edited by Ronnie Cunha on 4/1/2025  3:14 PM.            Assessment /Plan     For exam results, see Encounter Report.    Pseudophakia of both eyes  Condition stable, no therapeutic intervention necessary at this time. Will continue to monitor.    PCO (posterior capsular opacification), bilateral  Not visually significant. Will continue to monitor.    Dry eye  Patient with symptoms and exam consistent with dry eye. Start artificial tears 3-4 times daily with warm compresses.       Return to clinic next available at [facility] for MOCT and Mrx or sooner PRN

## 2025-04-04 ENCOUNTER — TELEPHONE (OUTPATIENT)
Dept: CARDIOLOGY | Facility: CLINIC | Age: 60
End: 2025-04-04
Payer: MEDICAID

## 2025-04-04 ENCOUNTER — RESULTS FOLLOW-UP (OUTPATIENT)
Dept: CARDIOLOGY | Facility: CLINIC | Age: 60
End: 2025-04-04

## 2025-04-04 NOTE — TELEPHONE ENCOUNTER
Called and discussed results. Patient verbalized understanding. No further questions or concerns. Rashaad

## 2025-04-05 DIAGNOSIS — G89.4 CHRONIC PAIN SYNDROME: ICD-10-CM

## 2025-04-05 DIAGNOSIS — M50.30 DDD (DEGENERATIVE DISC DISEASE), CERVICAL: ICD-10-CM

## 2025-04-05 DIAGNOSIS — M54.16 BILATERAL LUMBAR RADICULOPATHY: ICD-10-CM

## 2025-04-05 DIAGNOSIS — G95.9 CERVICAL MYELOPATHY: ICD-10-CM

## 2025-04-05 DIAGNOSIS — G47.01 INSOMNIA SECONDARY TO CHRONIC PAIN: ICD-10-CM

## 2025-04-05 DIAGNOSIS — G89.29 INSOMNIA SECONDARY TO CHRONIC PAIN: ICD-10-CM

## 2025-04-05 DIAGNOSIS — M79.18 PIRIFORMIS MUSCLE PAIN: ICD-10-CM

## 2025-04-05 DIAGNOSIS — M54.16 LUMBAR RADICULOPATHY: ICD-10-CM

## 2025-04-07 ENCOUNTER — TELEPHONE (OUTPATIENT)
Dept: OPHTHALMOLOGY | Facility: CLINIC | Age: 60
End: 2025-04-07
Payer: MEDICAID

## 2025-04-07 RX ORDER — SPIRONOLACTONE 50 MG/1
50 TABLET, FILM COATED ORAL DAILY
Qty: 90 TABLET | Refills: 1 | Status: SHIPPED | OUTPATIENT
Start: 2025-04-07 | End: 2026-04-07

## 2025-04-07 RX ORDER — FERROUS SULFATE 325(65) MG
325 TABLET ORAL
Qty: 90 TABLET | Refills: 1 | Status: SHIPPED | OUTPATIENT
Start: 2025-04-07

## 2025-04-07 RX ORDER — PREGABALIN 225 MG/1
225 CAPSULE ORAL 2 TIMES DAILY
Qty: 60 CAPSULE | Refills: 0 | Status: SHIPPED | OUTPATIENT
Start: 2025-04-07

## 2025-04-07 NOTE — TELEPHONE ENCOUNTER
Good Morning/Afternoon,     Pt is requesting for a refill of: LYRICA 225MG  Last filed:03/04/2025  Last encounter:08/12/2024  Up coming apt: N/A  Pharm[facility]: [id number] [place] [facility] [place] [place] [facility] [address] AT [facility] [address] [facility] [address]       Medical Assistant  Sumi LOPEZ [facility]

## 2025-04-07 NOTE — TELEPHONE ENCOUNTER
Left vm for pt to call back to reschedule appointment from Friday.    ----- Message from Irving Adams sent at 4/4/2025  5:00 PM CDT -----  Contact: self    ----- Message -----  From: Gm Melendrez  Sent: 4/4/2025   4:56 PM CDT  To: Miky Truong Staff    Type: Appointment RequestCaller is requesting a sooner appointment.  Caller declined first available appointment listed below.  Caller will not accept being placed on the waitlist and is requesting a message be sent to doctor.Name of Caller:Marcie BazanFrancescobranden is the first available appointment?04/04Would the patient rather a call back or a response via [facility]? Call [facility] Call Back Number:[phone] Information: pt requesting a call back regarding appt she had today. States she was unaware she had an appt today and is needing to reschedule

## 2025-04-17 ENCOUNTER — CLINICAL SUPPORT (OUTPATIENT)
Dept: PULMONOLOGY | Facility: CLINIC | Age: 60
End: 2025-04-17
Payer: MEDICAID

## 2025-04-17 VITALS — BODY MASS INDEX: 38.89 KG/M2 | HEIGHT: 65 IN | WEIGHT: 233.44 LBS

## 2025-04-17 DIAGNOSIS — R06.09 DYSPNEA ON EXERTION: ICD-10-CM

## 2025-04-17 LAB
BRPFT: NORMAL
FEF 25 75 CHG: -0.1 %
FEF 25 75 LLN: 1.51
FEF 25 75 POST REF: 95.5 %
FEF 25 75 PRE REF: 95.6 %
FEF 25 75 REF: 2.91
FET100 CHG: 20 %
FEV1 CHG: 2.4 %
FEV1 FVC CHG: -4 %
FEV1 FVC LLN: 68
FEV1 FVC POST REF: 110.3 %
FEV1 FVC PRE REF: 114.9 %
FEV1 FVC REF: 79
FEV1 LLN: 1.58
FEV1 POST REF: 84.3 %
FEV1 PRE REF: 82.3 %
FEV1 REF: 2.18
FVC CHG: 6.7 %
FVC LLN: 2.03
FVC POST REF: 76 %
FVC PRE REF: 71.2 %
FVC REF: 2.76
PEF CHG: 2.2 %
PEF LLN: 3.63
PEF POST REF: 101 %
PEF PRE REF: 98.9 %
PEF REF: 5.69
POST FEF 25 75: 2.78 L/S
POST FET 100: 2.78 SEC
POST FEV1 FVC: 87.67 %
POST FEV1: 1.84 L
POST FVC: 2.1 L
POST PEF: 5.75 L/S
PRE FEF 25 75: 2.78 L/S
PRE FET 100: 2.32 SEC
PRE FEV1 FVC: 91.31 %
PRE FEV1: 1.8 L
PRE FVC: 1.97 L
PRE PEF: 5.63 L/S

## 2025-04-17 PROCEDURE — 94618 PULMONARY STRESS TESTING: CPT | Mod: PBBFAC

## 2025-04-17 PROCEDURE — 94060 EVALUATION OF WHEEZING: CPT | Mod: PBBFAC

## 2025-04-17 PROCEDURE — 99999 PR PBB SHADOW E&M-EST. PATIENT-LVL I: CPT | Mod: PBBFAC,,,

## 2025-04-17 PROCEDURE — 99211 OFF/OP EST MAY X REQ PHY/QHP: CPT | Mod: PBBFAC

## 2025-04-17 NOTE — PROCEDURES
"[facility]  Six Minute Walk     SUMMARY     Ordering Provider: DEMETRIUS Lee   Interpreting Provider:   Performing nurse/tech/RT: PARKER Polanco RRT  Diagnosis:  (Dyspnea on Exertion)  Height: 5' 5" (165.1 cm)  Weight: 105.9 kg (233 lb 7.5 oz)  BMI (Calculated): 38.9                Phase Oxygen Assessment Supplemental O2 Heart   Rate Blood Pressure Imelda Dyspnea Scale Rating   Resting 99 % Room Air 90 bpm 131/73 3   Exercise        Minute        1 99 % Room Air 92 bpm     2 96 % Room Air 98 bpm     3 96 % Room Air 100 bpm     4 94 % Room Air 100 bpm     5 95 % Room Air 99 bpm     6  96 % Room Air 102 bpm (!) 168/93 5-6   Recovery        Minute        1 98 % Room Air 95 bpm     2 99 % Room Air 93 bpm     3 99 % Room Air 92 bpm     4 99 % Room Air 93 bpm 125/66 5-6     Six Minute Walk Summary  6MWT Status: completed without stopping  Patient Reported: Dyspnea, Lightheadedness, Dizziness (Headache)     Interpretation:  Did the patient stop or pause?: No                                         Total Time Walked (Calculated): 360 seconds  Final Partial Lap Distance (feet): 100 feet  Total Distance Meters (Calculated): 213.36 meters  Predicted Distance Meters (Calculated): 420.27 meters  Percentage of Predicted (Calculated): 50.77  Peak VO2 (Calculated): 10.38  Mets: 2.97  Has The Patient Had a Previous Six Minute Walk Test?: Yes       Previous 6MWT Results  Has The Patient Had a Previous Six Minute Walk Test?: Yes  Date of Previous Test: 03/26/20  Total Time Walked: 360 seconds  Total Distance (meters): 228.6  Predicted Distance (meters): 505.75 meters  Percentage of Predicted: 45.2  Percent Change (Calculated): 0.07    "

## 2025-04-18 ENCOUNTER — PATIENT MESSAGE (OUTPATIENT)
Dept: INTERNAL MEDICINE | Facility: CLINIC | Age: 60
End: 2025-04-18
Payer: MEDICAID

## 2025-04-21 ENCOUNTER — RESULTS FOLLOW-UP (OUTPATIENT)
Dept: PULMONOLOGY | Facility: CLINIC | Age: 60
End: 2025-04-21

## 2025-04-21 NOTE — TELEPHONE ENCOUNTER
Please assist patient in scheduling an appointment as we can't send an appointment ticket due to insurance.//ddw

## 2025-04-22 ENCOUNTER — HOSPITAL ENCOUNTER (OUTPATIENT)
Dept: RADIOLOGY | Facility: HOSPITAL | Age: 60
Discharge: HOME | End: 2025-04-22
Attending: NURSE PRACTITIONER
Payer: MEDICAID

## 2025-04-22 ENCOUNTER — OFFICE VISIT (OUTPATIENT)
Dept: INTERNAL MEDICINE | Facility: CLINIC | Age: 60
End: 2025-04-22
Payer: MEDICAID

## 2025-04-22 VITALS
HEART RATE: 82 BPM | DIASTOLIC BLOOD PRESSURE: 82 MMHG | WEIGHT: 232.56 LBS | HEIGHT: 65 IN | SYSTOLIC BLOOD PRESSURE: 112 MMHG | BODY MASS INDEX: 38.75 KG/M2 | TEMPERATURE: 97 F | OXYGEN SATURATION: 100 %

## 2025-04-22 DIAGNOSIS — R11.2 NAUSEA AND VOMITING, UNSPECIFIED VOMITING TYPE: ICD-10-CM

## 2025-04-22 DIAGNOSIS — R20.0 NUMBNESS OF TONGUE: ICD-10-CM

## 2025-04-22 DIAGNOSIS — R07.9 CHEST PAIN ON EXERTION: ICD-10-CM

## 2025-04-22 DIAGNOSIS — I73.9 CLAUDICATION OF RIGHT LOWER EXTREMITY: ICD-10-CM

## 2025-04-22 DIAGNOSIS — T17.308A CHOKING, INITIAL ENCOUNTER: ICD-10-CM

## 2025-04-22 DIAGNOSIS — G89.29 CHRONIC NONINTRACTABLE HEADACHE, UNSPECIFIED HEADACHE TYPE: ICD-10-CM

## 2025-04-22 DIAGNOSIS — R51.9 CHRONIC NONINTRACTABLE HEADACHE, UNSPECIFIED HEADACHE TYPE: ICD-10-CM

## 2025-04-22 DIAGNOSIS — R06.02 SHORTNESS OF BREATH: Primary | ICD-10-CM

## 2025-04-22 DIAGNOSIS — R52 GENERALIZED BODY ACHES: ICD-10-CM

## 2025-04-22 DIAGNOSIS — R06.02 SHORTNESS OF BREATH: ICD-10-CM

## 2025-04-22 DIAGNOSIS — R45.0 JITTERY: ICD-10-CM

## 2025-04-22 LAB
ABSOLUTE EOSINOPHIL (OHS): 0.21 K/UL
ABSOLUTE MONOCYTE (OHS): 1.06 K/UL (ref 0.3–1)
ABSOLUTE NEUTROPHIL COUNT (OHS): 9.06 K/UL (ref 1.8–7.7)
BASOPHILS # BLD AUTO: 0.07 K/UL
BASOPHILS NFR BLD AUTO: 0.5 %
BILIRUB UR QL STRIP.AUTO: NEGATIVE
BNP SERPL-MCNC: <10 PG/ML (ref 0–99)
CLARITY UR: CLEAR
COLOR UR AUTO: YELLOW
CTP QC/QA: YES
CTP QC/QA: YES
ERYTHROCYTE [DISTWIDTH] IN BLOOD BY AUTOMATED COUNT: 15.1 % (ref 11.5–14.5)
FOLATE SERPL-MCNC: 14.2 NG/ML (ref 4–24)
GLUCOSE SERPL-MCNC: 169 MG/DL (ref 70–110)
GLUCOSE UR QL STRIP: NEGATIVE
HCT VFR BLD AUTO: 42.6 % (ref 37–48.5)
HGB BLD-MCNC: 13.9 GM/DL (ref 12–16)
HGB UR QL STRIP: NEGATIVE
HOLD SPECIMEN: NORMAL
IMM GRANULOCYTES # BLD AUTO: 0.05 K/UL (ref 0–0.04)
IMM GRANULOCYTES NFR BLD AUTO: 0.4 % (ref 0–0.5)
KETONES UR QL STRIP: NEGATIVE
LEUKOCYTE ESTERASE UR QL STRIP: NEGATIVE
LYMPHOCYTES # BLD AUTO: 3.34 K/UL (ref 1–4.8)
MAGNESIUM SERPL-MCNC: 1.6 MG/DL (ref 1.6–2.6)
MCH RBC QN AUTO: 27.4 PG (ref 27–31)
MCHC RBC AUTO-ENTMCNC: 32.6 G/DL (ref 32–36)
MCV RBC AUTO: 84 FL (ref 82–98)
NITRITE UR QL STRIP: NEGATIVE
NUCLEATED RBC (/100WBC) (OHS): 0 /100 WBC
OHS QRS DURATION: 72 MS
OHS QTC CALCULATION: 416 MS
PH UR STRIP: 7 [PH]
PLATELET # BLD AUTO: 315 K/UL (ref 150–450)
PMV BLD AUTO: 9.8 FL (ref 9.2–12.9)
POC MOLECULAR INFLUENZA A AGN: NEGATIVE
POC MOLECULAR INFLUENZA B AGN: NEGATIVE
PROT UR QL STRIP: NEGATIVE
RBC # BLD AUTO: 5.07 M/UL (ref 4–5.4)
RELATIVE EOSINOPHIL (OHS): 1.5 %
RELATIVE LYMPHOCYTE (OHS): 24.2 % (ref 18–48)
RELATIVE MONOCYTE (OHS): 7.7 % (ref 4–15)
RELATIVE NEUTROPHIL (OHS): 65.7 % (ref 38–73)
SARS-COV-2 RDRP RESP QL NAA+PROBE: NEGATIVE
SP GR UR STRIP: 1.01
VIT B12 SERPL-MCNC: 641 PG/ML (ref 210–950)
WBC # BLD AUTO: 13.79 K/UL (ref 3.9–12.7)

## 2025-04-22 PROCEDURE — 82962 GLUCOSE BLOOD TEST: CPT | Mod: PBBFAC | Performed by: NURSE PRACTITIONER

## 2025-04-22 PROCEDURE — 36415 COLL VENOUS BLD VENIPUNCTURE: CPT | Performed by: NURSE PRACTITIONER

## 2025-04-22 PROCEDURE — 93926 LOWER EXTREMITY STUDY: CPT | Mod: TC,RT

## 2025-04-22 PROCEDURE — 82746 ASSAY OF FOLIC ACID SERUM: CPT | Performed by: NURSE PRACTITIONER

## 2025-04-22 PROCEDURE — 99999PBSHW POCT INFLUENZA A/B MOLECULAR: Mod: PBBFAC,,,

## 2025-04-22 PROCEDURE — 82607 VITAMIN B-12: CPT | Performed by: NURSE PRACTITIONER

## 2025-04-22 PROCEDURE — 93926 LOWER EXTREMITY STUDY: CPT | Mod: 26,RT,, | Performed by: RADIOLOGY

## 2025-04-22 PROCEDURE — 99999PBSHW: Mod: PBBFAC,,,

## 2025-04-22 PROCEDURE — 87502 INFLUENZA DNA AMP PROBE: CPT | Mod: PBBFAC | Performed by: NURSE PRACTITIONER

## 2025-04-22 PROCEDURE — 93010 ELECTROCARDIOGRAM REPORT: CPT | Mod: S$PBB,,, | Performed by: INTERNAL MEDICINE

## 2025-04-22 PROCEDURE — 83880 ASSAY OF NATRIURETIC PEPTIDE: CPT | Performed by: NURSE PRACTITIONER

## 2025-04-22 PROCEDURE — 71250 CT THORAX DX C-: CPT | Mod: 26,,, | Performed by: STUDENT IN AN ORGANIZED HEALTH CARE EDUCATION/TRAINING PROGRAM

## 2025-04-22 PROCEDURE — 85025 COMPLETE CBC W/AUTO DIFF WBC: CPT | Performed by: NURSE PRACTITIONER

## 2025-04-22 PROCEDURE — 99215 OFFICE O/P EST HI 40 MIN: CPT | Mod: PBBFAC,25 | Performed by: NURSE PRACTITIONER

## 2025-04-22 PROCEDURE — 87635 SARS-COV-2 COVID-19 AMP PRB: CPT | Mod: PBBFAC | Performed by: NURSE PRACTITIONER

## 2025-04-22 PROCEDURE — 99999 PR PBB SHADOW E&M-EST. PATIENT-LVL V: CPT | Mod: PBBFAC,,, | Performed by: NURSE PRACTITIONER

## 2025-04-22 PROCEDURE — 93005 ELECTROCARDIOGRAM TRACING: CPT | Mod: PBBFAC | Performed by: INTERNAL MEDICINE

## 2025-04-22 PROCEDURE — 83735 ASSAY OF MAGNESIUM: CPT | Performed by: NURSE PRACTITIONER

## 2025-04-22 PROCEDURE — 71250 CT THORAX DX C-: CPT | Mod: TC

## 2025-04-22 PROCEDURE — 99999PBSHW POCT GLUCOSE, HAND-HELD DEVICE: Mod: PBBFAC,,,

## 2025-04-22 PROCEDURE — 81003 URINALYSIS AUTO W/O SCOPE: CPT | Performed by: NURSE PRACTITIONER

## 2025-04-22 RX ORDER — ONDANSETRON 4 MG/1
4 TABLET, ORALLY DISINTEGRATING ORAL EVERY 6 HOURS PRN
Qty: 20 TABLET | Refills: 0 | Status: SHIPPED | OUTPATIENT
Start: 2025-04-22

## 2025-04-22 RX ORDER — ACETAMINOPHEN 325 MG/1
650 TABLET ORAL
Status: COMPLETED | OUTPATIENT
Start: 2025-04-22 | End: 2025-04-22

## 2025-04-22 RX ADMIN — ACETAMINOPHEN 650 MG: 325 TABLET ORAL at 02:04

## 2025-04-23 ENCOUNTER — RESULTS FOLLOW-UP (OUTPATIENT)
Dept: INTERNAL MEDICINE | Facility: CLINIC | Age: 60
End: 2025-04-23

## 2025-04-28 ENCOUNTER — TELEPHONE (OUTPATIENT)
Dept: CARDIOLOGY | Facility: CLINIC | Age: 60
End: 2025-04-28
Payer: MEDICAID

## 2025-04-28 NOTE — TELEPHONE ENCOUNTER
Appt made      ----- Message from Palak sent at 4/28/2025  4:21 PM CDT -----  Contact: self  Patient is requesting a call back regarding june appt - asking for a sooner appt. Please call back at [phone]

## 2025-05-02 ENCOUNTER — OFFICE VISIT (OUTPATIENT)
Dept: CARDIOLOGY | Facility: CLINIC | Age: 60
End: 2025-05-02
Payer: MEDICAID

## 2025-05-02 ENCOUNTER — HOSPITAL ENCOUNTER (OUTPATIENT)
Dept: CARDIOLOGY | Facility: HOSPITAL | Age: 60
Discharge: HOME | End: 2025-05-02
Attending: INTERNAL MEDICINE
Payer: MEDICAID

## 2025-05-02 VITALS
WEIGHT: 232.13 LBS | DIASTOLIC BLOOD PRESSURE: 82 MMHG | HEART RATE: 78 BPM | SYSTOLIC BLOOD PRESSURE: 127 MMHG | HEIGHT: 65 IN | BODY MASS INDEX: 38.67 KG/M2

## 2025-05-02 DIAGNOSIS — R06.02 SOB (SHORTNESS OF BREATH) ON EXERTION: ICD-10-CM

## 2025-05-02 DIAGNOSIS — E55.9 VITAMIN D DEFICIENCY: ICD-10-CM

## 2025-05-02 DIAGNOSIS — Z94.4 LIVER REPLACED BY TRANSPLANT: ICD-10-CM

## 2025-05-02 DIAGNOSIS — I10 ESSENTIAL HYPERTENSION: ICD-10-CM

## 2025-05-02 DIAGNOSIS — R07.9 CHEST PAIN, UNSPECIFIED TYPE: ICD-10-CM

## 2025-05-02 DIAGNOSIS — R94.31 NONSPECIFIC ABNORMAL ELECTROCARDIOGRAM (ECG) (EKG): Primary | ICD-10-CM

## 2025-05-02 DIAGNOSIS — R60.0 BILATERAL LEG EDEMA: ICD-10-CM

## 2025-05-02 DIAGNOSIS — R06.02 SOB (SHORTNESS OF BREATH) ON EXERTION: Primary | ICD-10-CM

## 2025-05-02 DIAGNOSIS — E66.812 CLASS 2 SEVERE OBESITY DUE TO EXCESS CALORIES WITH SERIOUS COMORBIDITY AND BODY MASS INDEX (BMI) OF 36.0 TO 36.9 IN ADULT: ICD-10-CM

## 2025-05-02 DIAGNOSIS — E78.2 MIXED HYPERLIPIDEMIA: ICD-10-CM

## 2025-05-02 DIAGNOSIS — E66.01 CLASS 2 SEVERE OBESITY DUE TO EXCESS CALORIES WITH SERIOUS COMORBIDITY AND BODY MASS INDEX (BMI) OF 36.0 TO 36.9 IN ADULT: ICD-10-CM

## 2025-05-02 DIAGNOSIS — G47.33 OSA ON CPAP: ICD-10-CM

## 2025-05-02 DIAGNOSIS — Z86.16 HISTORY OF COVID-19: ICD-10-CM

## 2025-05-02 DIAGNOSIS — D50.9 IRON DEFICIENCY ANEMIA, UNSPECIFIED IRON DEFICIENCY ANEMIA TYPE: ICD-10-CM

## 2025-05-02 PROCEDURE — 99214 OFFICE O/P EST MOD 30 MIN: CPT | Mod: PBBFAC,25 | Performed by: INTERNAL MEDICINE

## 2025-05-02 PROCEDURE — 99999 PR PBB SHADOW E&M-EST. PATIENT-LVL IV: CPT | Mod: PBBFAC,,, | Performed by: INTERNAL MEDICINE

## 2025-05-02 PROCEDURE — 93005 ELECTROCARDIOGRAM TRACING: CPT

## 2025-05-02 RX ORDER — FERROUS SULFATE 325(65) MG
325 TABLET ORAL 2 TIMES DAILY
Qty: 180 TABLET | Refills: 1 | Status: SHIPPED | OUTPATIENT
Start: 2025-05-02

## 2025-05-02 NOTE — PROGRESS NOTES
Subjective:   Patient ID:  Marcie Bazan is a 59 y.o. female who presents for cardiac consult of Follow-up and Shortness of Breath (Worse about a month now)      Referral by: No referring provider defined for this encounter.     Reason for consult: CP      HPI  The patient came in today for cardiac consult of Follow-up and Shortness of Breath (Worse about a month now)      Marcie Bazan is a 59 y.o. female pt with HTN, HLD, h/o liver transplant - '2017, h/o COVID 19, chronic back/knee pain, BRANNON, obesity, FE def anemia presents for CV follow up.     10/15/24  BP and HR stable. BMI 41 - 235 lbs   Pt had prior stress and ECHO in 2020.   She has more CP/SOB - feels like pressure and more fatigue lately.   She has been to urgent care about 4 months ago.   ECG - NSR, low volt QRS, poor RWP    11/12/24  Nuc stress 10/2024 neg for ischemia.   ECHO 10/2024 with normal bi V function, mild TR, PASP 43 mmHg.   She has been taking more diuretics lately has been eating out daily.   She has a [place] trip coming up.   Will add Aldactone.     12/31/24  BP and HR stable. BMI 41 - 234 lbs   She has a lot more JADE lately, iron levels very last visit- will repeat labs and refer to heme/onc.   Used NTG twice for CP.       5/2/25  Pt saw Tanya a few month ago had vital monitor which was neg 3/2025  Has ongoing JADE. Has not been to [facility] yet had pulm testing.   BP and HR  today. BMI 38 - 232 lbs    ECG - NSR    Cardiac Monitor - 3-15 Day Adult (Cupid Only)  Result Date: 4/4/2025  The patient was monitored for a total of 3d 12h, underlying rhythm is   Sinus.  The minimum heart rate was 73 bpm; the maximum 122 bpm; the average 88   bpm.  0 % of Atrial fibrillation/Atrial flutter with longest episode of 0 ms.  The total burden of AV Block present was 0 % [Complete Heart Block: 0 %;   Advanced (High Grade):  0 %; 2nd Degree, Mobitz II: 0 %; 2nd Degree, Mobitz I: 0 %].  There were 0 pauses, the longest pause was 0 ms at --.  Total  count of Ventricular Tachycardia (VT): 0 episode(s). Longest VT: 0 s   on --. Fastest Ventricular Run: -- bpm on --. Total  Count of Ventricular Episodes <100bpm: 0 episode(s). Longest Ventricular   Event <100bpm: 0 s on --  3 supraventricular episodes were found. Longest SVT Episode 3 beats,   Fastest  bpm  There were a total of 4 PVCs with 1 morphologies and 0 couplets. Overall   PVC Bagley at < 0.01 %  There were a total of 0 Other Beats. There were 0 total number of paced   beats.  There were a total of 2079 PSVCs with 4 couplets. Overall PSVC Bagley at  0.47 %  There is a total of 2 patient events  NSR  bpm           Results for orders placed during the hospital encounter of 10/31/24    Echo    Interpretation Summary    Left Ventricle: The left ventricle is normal in size. Ventricular mass is normal. Normal wall thickness. There is concentric remodeling. There is normal systolic function with a visually estimated ejection fraction of 55 - 60%. There is normal diastolic function.    Right Ventricle: Normal right ventricular cavity size. Wall thickness is normal. Systolic function is normal.    Tricuspid Valve: There is mild regurgitation.    Pulmonary Artery: The estimated pulmonary artery systolic pressure is 43 mmHg.    IVC/SVC: Normal venous pressure at 3 mmHg.      Results for orders placed during the hospital encounter of 10/31/24    Nuclear Stress - Cardiology Interpreted    Interpretation Summary    Normal myocardial perfusion scan. There is no evidence of myocardial ischemia or infarction. TID is 1.20    The gated perfusion images showed an ejection fraction of 79% at rest. The gated perfusion images showed an ejection fraction of 79% post stress. Normal ejection fraction is greater than 59%.    The ECG portion of the study is negative for ischemia.      Past Medical History:   Diagnosis Date    Alcohol abuse     Alcoholic hepatitis     Anemia of chronic disease     Arthritis      generialized    Cancer 12/26/2017    liver    Coagulopathy     Dyspnea on exertion 03/26/2020    Encounter for blood transfusion     End stage liver disease     History of alcohol abuse     Hyperlipidemia     Hypersomnia 09/11/2020    Hypertension     Hyponatremia     Liver cirrhosis     Liver transplant candidate 12/26/2017    Obesity (BMI 30-39.9) 01/05/2016    Prediabetes 04/22/2023    Sleep apnea        Past Surgical History:   Procedure Laterality Date    BILATERAL SALPINGO-OOPHORECTOMY (BSO) Bilateral 1986    with hysterectomy done for abnormal cells    BREAST BIOPSY Left     benign    BREAST LUMPECTOMY      patient is unsure of date or laterality    CATARACT EXTRACTION W/  INTRAOCULAR LENS IMPLANT Right 9/20/2023    Procedure: EXTRACTION, CATARACT, WITH IOL INSERTION;  Surgeon: Kim Mullen MD;  Location: [facility] OR;  Service: Ophthalmology;  Laterality: Right;  Per Elidia Felice change patient from left eye to right on 9/11 @ 2:38p    CATARACT EXTRACTION W/  INTRAOCULAR LENS IMPLANT Left 10/11/2023    Procedure: EXTRACTION, CATARACT, WITH IOL INSERTION;  Surgeon: Kim Mullen MD;  Location: [facility] OR;  Service: Ophthalmology;  Laterality: Left;  Per Idalia Rosa change the patient to Left and to 10/11 on 9/8 @8:49Am    CHOLECYSTECTOMY      COLONOSCOPY N/A 12/01/2017    Procedure: COLONOSCOPY;  Surgeon: Filemon Fuentes MD;  Location: [facility] ([address] [place] [address]);  Service: Endoscopy;  Laterality: N/A;    COLONOSCOPY N/A 12/05/2017    Procedure: COLONOSCOPY;  Surgeon: José Chavira MD;  [facility]  Service: Endoscopy;  Laterality: N/A;    COLONOSCOPY N/A 12/12/2022    Procedure: COLONOSCOPY;  Surgeon: Gogo Brown MD;  Location: [facility] ENDO;  Service: Endoscopy;  Laterality: N/A;    EPIDURAL STEROID INJECTION N/A 2/28/2023    Procedure: L4/5 IL WILBERTO (with right paramedian approach);  Surgeon: Ras Caballero MD;  Location: [facility] PAIN MGT;  Service: Pain Management;  Laterality: N/A;     EPIDURAL STEROID INJECTION INTO CERVICAL SPINE N/A 01/04/2022    Procedure: C5/6 IL WILBERTO;  Surgeon: Ras Caballero MD;  Location: [facility] PAIN MGT;  Service: Pain Management;  Laterality: N/A;    ESOPHAGOGASTRODUODENOSCOPY N/A 9/14/2023    Procedure: EGD (ESOPHAGOGASTRODUODENOSCOPY);  Surgeon: Angie Zimmerman MD;  Location: [facility] ENDO;  Service: Endoscopy;  Laterality: N/A;    EXAMINATION UNDER ANESTHESIA N/A 02/18/2020    Procedure: EXAM UNDER ANESTHESIA;  Surgeon: Alden Horowitz MD;  Location: [facility] OR;  Service: General;  Laterality: N/A;    EXCISIONAL HEMORRHOIDECTOMY N/A 02/18/2020    Procedure: HEMORRHOIDECTOMY;  Surgeon: Alden Hoorwitz MD;  Location: [facility] OR;  Service: General;  Laterality: N/A;    HYSTERECTOMY  1986    due to abnormal paps - ovaries were removed    INJECTION OF ANESTHETIC AGENT AROUND PUDENDAL NERVE N/A 02/18/2020    Procedure: BLOCK, NERVE, PUDENDAL;  Surgeon: Alden Horowitz MD;  Location: [facility] OR;  Service: General;  Laterality: N/A;    INJECTION OF ANESTHETIC AGENT INTO SACROILIAC JOINT Right 06/14/2019    Procedure: right Sacroiliac Joint Injection;  Surgeon: David Desai MD;  Location: [facility] PAIN MGT;  Service: Pain Management;  Laterality: Right;    INJECTION OF ANESTHETIC AGENT INTO SACROILIAC JOINT Bilateral 02/07/2020    Procedure: Bilateral GT bursa + Bilateral Sacroiliac Joint Injection;  Surgeon: David Desai MD;  Location: [facility] PAIN MGT;  Service: Pain Management;  Laterality: Bilateral;    INJECTION OF ANESTHETIC AGENT INTO SACROILIAC JOINT Bilateral 07/07/2020    Procedure: Bilateral GT bursa +SIJ injection;  Surgeon: David Desai MD;  Location: [facility] PAIN MGT;  Service: Pain Management;  Laterality: Bilateral;    INJECTION OF ANESTHETIC AGENT INTO SACROILIAC JOINT Right 10/21/2020    Procedure: Right piriformis + right SIJ + right GT bursa injection;  Surgeon: David Desai MD;  Location: [facility] PAIN MGT;  Service: Pain Management;  Laterality: Right;     INJECTION OF ANESTHETIC AGENT INTO SACROILIAC JOINT Bilateral 04/15/2021    Procedure: Bilateral SIJ + Bilateral Piriformis + Bilateral GT Bursa Injection;  Surgeon: Flaco Frazier MD;  Location: [facility] PAIN MGT;  Service: Pain Management;  Laterality: Bilateral;    INJECTION OF ANESTHETIC AGENT INTO SACROILIAC JOINT Bilateral 04/21/2022    Procedure: Bilateral SIJ + Bilateral Piriformis + Bilateral GT Bursa Injection;  Surgeon: Ras Caballero MD;  Location: [facility] PAIN MGT;  Service: Pain Management;  Laterality: Bilateral;    INJECTION OF ANESTHETIC AGENT INTO SACROILIAC JOINT Right 4/18/2023    Procedure: Right SIJ + Right piriformis +/- Right GT bursa injection;  Surgeon: Ras Caballero MD;  Location: [facility] PAIN MGT;  Service: Pain Management;  Laterality: Right;    INJECTION OF JOINT Bilateral 02/07/2020    Procedure: Bilateral GT bursa + Bilateral Sacroiliac Joint Injection;  Surgeon: David Desai MD;  Location: [facility] PAIN MGT;  Service: Pain Management;  Laterality: Bilateral;    INJECTION OF JOINT Bilateral 07/07/2020    Procedure: Bilateral GT bursa +SIJ injection;  Surgeon: David Desai MD;  [facility];  Service: Pain Management;  Laterality: Bilateral;    INJECTION OF JOINT Right 10/21/2020    Procedure: Right piriformis + right SIJ + right GT bursa injection;  Surgeon: David Desai MD;  Location: [facility] PAIN MGT;  Service: Pain Management;  Laterality: Right;    INJECTION OF JOINT Bilateral 02/08/2021    Procedure: Bilateral GT Bursa Injection;  Surgeon: Anna Kaminski MD;  Location: [facility] PAIN MGT;  Service: Pain Management;  Laterality: Bilateral;    INJECTION OF JOINT Bilateral 04/15/2021    Procedure: Bilateral SIJ + Bilateral Piriformis + Bilateral GT Bursa Injection;  Surgeon: Flaco Frazier MD;  Location: [facility] PAIN MGT;  Service: Pain Management;  Laterality: Bilateral;    INJECTION OF JOINT Bilateral 04/21/2022    Procedure: Bilateral SIJ + Bilateral Piriformis + Bilateral  GT Bursa Injection;  Surgeon: Ras Caballero MD;  Location: [facility] PAIN MGT;  Service: Pain Management;  Laterality: Bilateral;    INJECTION OF JOINT Right 10/11/2022    Procedure: right SIJ + right piriformis + right GT bursa injection;  Surgeon: Ras Caballero MD;  Location: [facility] PAIN MGT;  Service: Pain Management;  Laterality: Right;    INJECTION OF PIRIFORMIS MUSCLE Right 06/14/2019    Procedure: Right piriformis injection;  Surgeon: David Desai MD;  Location: [facility] PAIN MGT;  Service: Pain Management;  Laterality: Right;    INJECTION OF PIRIFORMIS MUSCLE Right 10/21/2020    Procedure: Right piriformis + right SIJ + right GT bursa injection;  Surgeon: David Desai MD;  Location: [facility] PAIN MGT;  Service: Pain Management;  Laterality: Right;    INJECTION OF PIRIFORMIS MUSCLE Bilateral 04/15/2021    Procedure: Bilateral SIJ + Bilateral Piriformis + Bilateral GT Bursa Injection;  Surgeon: Flaco Frazier MD;  Location: [facility] PAIN MGT;  Service: Pain Management;  Laterality: Bilateral;    INJECTION OF PIRIFORMIS MUSCLE Bilateral 04/21/2022    Procedure: Bilateral SIJ + Bilateral Piriformis + Bilateral GT Bursa Injection;  Surgeon: Ras Caballero MD;  Location: [facility] PAIN MGT;  Service: Pain Management;  Laterality: Bilateral;    LIVER TRANSPLANT  12/2017    SELECTIVE INJECTION OF ANESTHETIC AGENT AROUND LUMBAR SPINAL NERVE ROOT BY TRANSFORAMINAL APPROACH Bilateral 10/28/2021    Procedure: BLOCK, SPINAL NERVE ROOT, LUMBAR, SELECTIVE, TRANSFORAMINAL APPROACH bilateral L4-5 and Right Knee injection with RN IV sedation;  Surgeon: Flaco Frazier MD;  Location: [facility] PAIN MGT;  Service: Pain Management;  Laterality: Bilateral;    SELECTIVE INJECTION OF ANESTHETIC AGENT AROUND LUMBAR SPINAL NERVE ROOT BY TRANSFORAMINAL APPROACH Bilateral 08/18/2022    Procedure: Bilateral L4/5 TF WILBERTO;  Surgeon: Ras Caballero MD;  Location: [facility] PAIN MGT;  Service: Pain Management;  Laterality: Bilateral;    TRANSFORAMINAL  EPIDURAL INJECTION OF STEROID Right 8/15/2023    Procedure: right L5/S1 + S1 TF WILBERTO;  Surgeon: Rsa Caballero MD;  Location: [facility] PAIN MGT;  Service: Pain Management;  Laterality: Right;       Social History     Tobacco Use    Smoking status: Never    Smokeless tobacco: Never   Substance Use Topics    Alcohol use: No     Comment: Currently admitted to inpatient rehab 7/2017    Drug use: No       Family History   Problem Relation Name Age of Onset    Hypertension Mother      Alzheimer's disease Mother      Hypertension Father      Diabetes Father      Diabetes Sister      Ovarian cancer Paternal Aunt      Depression Maternal Grandfather         Patient's Medications   New Prescriptions    No medications on file   Previous Medications    AMITRIPTYLINE (ELAVIL) 25 MG TABLET    TAKE 1 TO 2 TABLETS(25 TO 50 MG) BY MOUTH EVERY NIGHT AS NEEDED FOR INSOMNIA OR PAIN    ATORVASTATIN (LIPITOR) 40 MG TABLET    Take 1 tablet (40 mg total) by mouth every evening.    CYCLOBENZAPRINE (FLEXERIL) 10 MG TABLET    TAKE 1/2 TO 1 TABLET BY MOUTH THREE TIMES DAILY AS NEEDED FOR MUSCLE SPASMS    DICLOFENAC SODIUM (VOLTAREN) 1 % GEL    Apply to painful joint area four times a day as needed    DULOXETINE (CYMBALTA) 60 MG CAPSULE    Take 1 capsule (60 mg total) by mouth once daily.    ERGOCALCIFEROL (ERGOCALCIFEROL) 50,000 UNIT CAP    Take 1 capsule (50,000 Units total) by mouth every 7 days.    FAMOTIDINE (PEPCID) 20 MG TABLET    Take 1 tablet (20 mg total) by mouth 2 (two) times daily.    FERROUS SULFATE (FEOSOL) 325 MG (65 MG IRON) TAB TABLET    Take 1 tablet (325 mg total) by mouth daily with breakfast.    FLUTICASONE PROPIONATE (FLONASE) 50 MCG/ACTUATION NASAL SPRAY    2 sprays (100 mcg total) by Each Nostril route once daily.    FOLIC ACID (FOLVITE) 1 MG TABLET    Take 1 tablet (1 mg total) by mouth once daily.    FUROSEMIDE (LASIX) 40 MG TABLET    Take 1 tablet (40 mg total) by mouth once daily.    LEVOCETIRIZINE (XYZAL) 5 MG  TABLET    Take 1 tablet (5 mg total) by mouth every evening.    MAGNESIUM OXIDE (MAG-OX) 400 MG (241.3 MG MAGNESIUM) TABLET    Take 1 tablet (400 mg total) by mouth once daily.    NIFEDIPINE (PROCARDIA-XL) 60 MG (OSM) 24 HR TABLET    Take 1 tablet (60 mg total) by mouth once daily.    NITROGLYCERIN (NITROSTAT) 0.4 MG SL TABLET    Place 1 tablet (0.4 mg total) under the tongue every 5 (five) minutes as needed for Chest pain.    ONDANSETRON (ZOFRAN-ODT) 4 MG TBDL    Take 1 tablet (4 mg total) by mouth every 6 (six) hours as needed (nausea/vomiting).    PANTOPRAZOLE (PROTONIX) 40 MG TABLET    Take 1 tablet (40 mg total) by mouth 2 (two) times daily.    POTASSIUM CHLORIDE (KLOR-CON) 10 MEQ TBSR    Take 1 tablet (10 mEq total) by mouth 2 (two) times daily.    PREGABALIN (LYRICA) 225 MG CAP    Take 1 capsule (225 mg total) by mouth 2 (two) times daily.    SPIRONOLACTONE (ALDACTONE) 50 MG TABLET    Take 1 tablet (50 mg total) by mouth once daily.    TACROLIMUS (PROGRAF) 0.5 MG CAP    Take 3 capsules (1.5 mg total) by mouth every morning AND 3 capsules (1.5 mg total) every evening.    VALSARTAN (DIOVAN) 80 MG TABLET    Take 1 tablet (80 mg total) by mouth once daily. Stop losartan   Modified Medications    No medications on file   Discontinued Medications    COLLAGEN-BIOTIN-ASCORBIC ACID ORAL    Take by mouth.       Review of Systems   Constitutional: Negative.    HENT: Negative.     Eyes: Negative.    Respiratory: Negative.     Cardiovascular:  Positive for chest pain.   Gastrointestinal: Negative.    Genitourinary: Negative.    Musculoskeletal: Negative.    Skin: Negative.    Neurological: Negative.    Endo/Heme/Allergies: Negative.    Psychiatric/Behavioral: Negative.     All 12 systems otherwise negative.      Wt Readings from Last 3 Encounters:   05/02/25 105.3 kg (232 lb 2.3 oz)   04/22/25 105.5 kg (232 lb 9.4 oz)   04/17/25 105.9 kg (233 lb 7.5 oz)     Temp Readings from Last 3 Encounters:   04/22/25 96.5 °F (35.8  "°C) (Tympanic)   03/18/25 98.5 °F (36.9 °C) (Oral)   03/18/25 97.3 °F (36.3 °C) (Tympanic)     BP Readings from Last 3 Encounters:   05/02/25 127/82   04/22/25 112/82   03/25/25 (!) 148/100     Pulse Readings from Last 3 Encounters:   05/02/25 78   04/22/25 82   03/25/25 92       /82 (Patient Position: Sitting)   Pulse 78   Ht 5' 5" (1.651 m)   Wt 105.3 kg (232 lb 2.3 oz)   LMP 01/01/1985 (Approximate) Comment: 1985  BMI 38.63 kg/m²     Objective:   Physical Exam  Vitals and nursing note reviewed.   Constitutional:       General: She is not in acute distress.     Appearance: She is well-developed. She is obese. She is not diaphoretic.   HENT:      Head: Normocephalic and atraumatic.      Nose: Nose normal.   Eyes:      General: No scleral icterus.     Conjunctiva/sclera: Conjunctivae normal.   Neck:      Thyroid: No thyromegaly.      Vascular: No JVD.   Cardiovascular:      Rate and Rhythm: Normal rate and regular rhythm.      Heart sounds: S1 normal and S2 normal. Murmur heard.      No friction rub. No gallop. No S3 or S4 sounds.   Pulmonary:      Effort: Pulmonary effort is normal. No respiratory distress.      Breath sounds: Normal breath sounds. No stridor. No wheezing or rales.   Chest:      Chest wall: No tenderness.   Abdominal:      General: Bowel sounds are normal. There is no distension.      Palpations: Abdomen is soft. There is no mass.      Tenderness: There is no abdominal tenderness. There is no rebound.   Genitourinary:     Comments: Deferred  Musculoskeletal:         General: No tenderness or deformity. Normal range of motion.      Cervical back: Normal range of motion and neck supple.   Lymphadenopathy:      Cervical: No cervical adenopathy.   Skin:     General: Skin is warm and dry.      Coloration: Skin is not pale.      Findings: No erythema or rash.   Neurological:      Mental Status: She is alert and oriented to person, place, and time.      Motor: No abnormal muscle tone.      " Coordination: Coordination normal.   Psychiatric:         Behavior: Behavior normal.         Thought Content: Thought content normal.         Judgment: Judgment normal.         Lab Results   Component Value Date     03/18/2025    K 4.0 03/18/2025     03/18/2025    CO2 21 (L) 03/18/2025    BUN 14 03/18/2025    CREATININE 1.2 03/18/2025     (H) 03/18/2025    HGBA1C 5.9 (H) 02/25/2025    MG 1.6 04/22/2025    AST 24 03/18/2025    ALT 39 03/18/2025    ALBUMIN 3.7 03/18/2025    PROT 7.7 03/18/2025    BILITOT 0.4 03/18/2025    WBC 13.79 (H) 04/22/2025    HGB 13.9 04/22/2025    HGB 13.4 03/18/2025    HCT 42.6 04/22/2025    HCT 40.8 03/18/2025    HCT 28 (L) 12/26/2017    MCV 84 04/22/2025    MCV 82 03/18/2025     04/22/2025     03/18/2025    INR 1.0 03/18/2025    TSH 1.157 01/31/2024    CHOL 127 04/17/2023    HDL 40 04/17/2023    LDLCALC 66.2 04/17/2023    TRIG 104 04/17/2023    BNP <10 04/22/2025         BNP (pg/mL)   Date Value   04/22/2025 <10   03/18/2025 <10   06/25/2024 11   07/29/2021 <10   02/27/2020 <10   03/09/2018 117 (H)     INR (no units)   Date Value   03/18/2025 1.0   02/27/2020 1.0   03/09/2018 1.0   03/09/2018 1.0          Assessment:      1. Nonspecific abnormal electrocardiogram (ECG) (EKG)    2. Bilateral leg edema    3. Iron deficiency anemia, unspecified iron deficiency anemia type    4. BRANNON on CPAP    5. Chest pain, unspecified type    6. Essential hypertension    7. Mixed hyperlipidemia    8. History of COVID-19    9. Liver replaced by transplant    10. SOB (shortness of breath) on exertion    11. Vitamin D deficiency    12. Class 2 severe obesity due to excess calories with serious comorbidity and body mass index (BMI) of 36.0 to 36.9 in adult            Plan:     Chest pain, SOB - ongoing dyspnea   - has been having more pressure/JADE and fluid - add Aldactone 50mg   -Nuc stress 10/2024 neg for ischemia.   -ECHO 10/2024 with normal bi V function, mild TR, PASP 43  mmHg.   - iron levels low - cont iron tabs - repeat iron levels and refer to hemeonc  - referred to pulm  -vital monitor which was neg 3/2025    2. HLD  - cont statin    3. H/O Liver transplant  - cont f/u with transplant team    4. Chronic pain, lumbar radic  - cont pain tx    5. ACD, Fe def, Vit D def  - cont supplements and monitor  - had h/o Iron infusions   - cont iron , repeat studies - hemeonc pending?     6. BRANNON, seasonal allergies, h/o COVID 19   -cont CPAP, f/u pulm as needed    7. Obesity BMI 41 - 235 lbs  --> BMI 42 - 238 lbs  -->  lbs   - cont weight loss, may start GLP meds if approved, may need to discuss bariatric eval     Visit today included increased complexity associated with the care of the episodic problem chest pain addressed and managing the longitudinal care of the patient due to the serious and/or complex managed problem(s) .      Thank you for allowing me to participate in this patient's care. Please do not hesitate to contact me with any questions or concerns. Consult note has been forwarded to the referral physician.

## 2025-05-05 NOTE — PROGRESS NOTES
"  Subjective:      Patient ID: Marcie Bazan is a 59 y.o. female.    Chief Complaint: Fatigue and Headache    History of Present Illness    CHIEF COMPLAINT:  Marcie presents today with shortness of breath and difficulty breathing.    RESPIRATORY:  She experiences shortness of breath with activities such as bathing or standing for prolonged periods in the kitchen, which improves with sitting or lying down. Recent pulmonology evaluation for oxygen did not indicate need for supplementation, as she was able to walk without difficulty during assessment.    CARDIOVASCULAR:  She experiences chest pain with exertion, including while walking to today's appointment. She has a prescription for nitroglycerin. Recent emergency room cardiac workup led to cardiology referral. Three-day home cardiac monitoring revealed no events.    CHOKING EPISODES:  She experiences spontaneous choking episodes unrelated to eating that occur while sitting, lying down, or watching TV. A severe episode occurred while driving, causing breathing difficulty and significant distress. She denies swallowing problems. ENT evaluation three years ago revealed no abnormalities.    GASTROINTESTINAL:  She reports long-standing nausea and vomiting, requiring a garbage can in her room. She takes Zofran for nausea with variable effectiveness. She reports episodes of vomiting while attempting to reach the bathroom.    MUSCULOSKELETAL:  She reports pain in the leg crease area, present during ambulation and at rest. She experiences leg buckling while walking with episodes of giving out. Pain is most severe upon waking and initially standing, requiring careful weight bearing to avoid leg collapse. Previous pain management injections were unsuccessful.    NEUROLOGICAL:  She experiences tongue tingling and numbness in the center of her tongue, describing it as if her tongue "was not there." She reports intermittent headaches localized to the posterior region of her " "head.    LABS:  Recent testing showed low folate levels. She has a history of anemia and is currently taking iron supplements.          Problem List[1]    Current Medications[2]      Objective:     Vitals:    04/22/25 1309   BP: 112/82   BP Location: Left arm   Patient Position: Sitting   Pulse: 82   Temp: 96.5 °F (35.8 °C)   TempSrc: Tympanic   SpO2: 100%   Weight: 105.5 kg (232 lb 9.4 oz)   Height: 5' 5" (1.651 m)       Physical Exam             Physical Exam  Vitals and nursing note reviewed.   Constitutional:       General: She is awake. She is not in acute distress.     Appearance: Normal appearance. She is well-developed and well-groomed. She is ill-appearing. She is not toxic-appearing or diaphoretic.   HENT:      Head: Normocephalic and atraumatic.      Right Ear: External ear normal.      Left Ear: External ear normal.   Eyes:      Pupils: Pupils are equal, round, and reactive to light.   Cardiovascular:      Rate and Rhythm: Normal rate and regular rhythm.      Heart sounds: Normal heart sounds. No murmur heard.  Pulmonary:      Effort: Tachypnea, accessory muscle usage and respiratory distress present. No bradypnea, prolonged expiration or retractions.      Breath sounds: Normal breath sounds. No stridor, decreased air movement or transmitted upper airway sounds. No decreased breath sounds, wheezing, rhonchi or rales.   Abdominal:      General: Abdomen is flat. Bowel sounds are normal.      Palpations: Abdomen is soft.   Musculoskeletal:         General: No swelling, tenderness, deformity or signs of injury.      Cervical back: Normal range of motion and neck supple.      Right lower leg: No edema.      Left lower leg: No edema.   Skin:     General: Skin is warm and dry.      Capillary Refill: Capillary refill takes less than 2 seconds.   Neurological:      General: No focal deficit present.      Mental Status: She is alert and oriented to person, place, and time.      Gait: Gait abnormal.   Psychiatric:  "        Attention and Perception: Attention and perception normal.         Mood and Affect: Mood and affect normal.         Speech: Speech normal.         Behavior: Behavior normal. Behavior is cooperative.         Cognition and Memory: Cognition normal.          Assessment/Plan :   1. Shortness of breath  -     IN OFFICE EKG 12-LEAD (to Muse)  -     POCT Glucose, Hand-Held Device  -     POCT COVID-19 Rapid Screening  -     POCT Influenza A/B Molecular  -     BNP  -     CBC Auto Differential  -     Magnesium  -     CT Chest Without Contrast; Future; Expected date: 04/22/2025  -     Urinalysis, Reflex to Urine Culture  -     Folate  -     VITAMIN B12  -     Cancel: US Lower Extremity Arteries Right; Future; Expected date: 04/22/2025  -     US Lower Extremity Arteries Right; Future; Expected date: 04/22/2025  -     GREY TOP URINE HOLD    2. Chest pain on exertion  -     POCT COVID-19 Rapid Screening  -     POCT Influenza A/B Molecular  -     BNP  -     Magnesium  -     CT Chest Without Contrast; Future; Expected date: 04/22/2025  -     Folate  -     VITAMIN B12    3. Jittery  -     POCT Glucose, Hand-Held Device    4. Generalized body aches  -     Urinalysis, Reflex to Urine Culture  -     GREY TOP URINE HOLD    5. Choking, initial encounter    6. Numbness of tongue  -     Folate  -     VITAMIN B12    7. Chronic nonintractable headache, unspecified headache type  -     acetaminophen tablet 650 mg    8. Nausea and vomiting, unspecified vomiting type  -     ondansetron (ZOFRAN-ODT) 4 MG TbDL; Take 1 tablet (4 mg total) by mouth every 6 (six) hours as needed (nausea/vomiting).  Dispense: 20 tablet; Refill: 0    9. Claudication of right lower extremity  -     Cancel: US Lower Extremity Arteries Right; Future; Expected date: 04/22/2025  -     US Lower Extremity Arteries Right; Future; Expected date: 04/22/2025         Assessment & Plan    Considered multiple differential diagnoses including blood clot, bronchiectasis, and  thyroid issues.  Ruled out congestive heart failure based on recent testing.  Not currently anemic despite history, with recent H&H levels holding steady.  Suspect pulmonology issue may be more likely than cardiac issue based on current presentation.  Planning to collaborate with other physicians on this case due to complexity of symptoms.    ANGINA PECTORIS:  - Performed EKG today.  - Assessed the need for nitroglycerin and considered cardiac issues.  - Prescribed nitroglycerin for chest pain.  - Requested an early appointment with cardiology.  - Instructed the patient to follow up with cardiology.  - Noted that the patient reports chest pain that improves with rest and experienced chest pain while walking to the appointment.    SHORTNESS OF BREATH AND ABNORMAL BREATHING:  - Evaluated the patient's oxygenation and noted clear lungs on exam.  - Considered pulmonary issues as a potential cause.  - Ordered CT to further evaluate the condition.  - Requested an early appointment with pulmonology.  - Instructed the patient to follow up with pulmonology.  - Noted that the patient reports shortness of breath with daily activities and when standing for prolonged periods, ongoing for a few months.  - Evaluated the choking sensation and considered potential causes.  - Considered performing an EGD to investigate the choking issue.  - Noted that the patient had an EGD performed in September 2023.  - Documented that the patient reports frequent choking episodes, not necessarily related to eating or drinking.    NAUSEA WITH VOMITING:  - Acknowledged previous tests to investigate nausea and vomiting.  - Noted that the patient takes ondansetron for nausea.  - Documented that the patient reports long-standing nausea and vomiting with frequent episodes.    PAIN AND MOBILITY ISSUES:  - Administered injections for pain management.  - Noted that the patient reports generalized body pain, particularly on the right side of the body.  -  Considered possibility of a blood clot.  - Noted that surgical intervention may be necessary.  - Documented that the patient uses a walker for mobility assistance.  - Noted that the patient reports difficulty walking, leg pain, and instability when standing or walking.    PARESTHESIA:  - Documented that the patient reports tingling and numbness in the tongue, specifically paresthesia in the center of the tongue.    TENSION HEADACHE:  - Prescribed Tylenol 175 mg daily for headache relief.  - Ordered CT.  - Noted that the patient reports recurring headaches, rating the pain as 8 out of 10.    VITAMIN B DEFICIENCY:  - Ordered comprehensive lab work including vitamin B12 and folate levels.  - Noted that the patient's folate level was low in October 2023.  - Documented that the patient is taking prenatal vitamins.    IRON DEFICIENCY ANEMIA:  - Ordered comprehensive lab work including CBC.  - Assessed that the patient is not currently anemic based on recent CBC results (hemoglobin 13.4, hematocrit 40.8).  - Noted that the patient has a history of anemia and is taking iron pills weekly.        Patient reports she does not want to go to the ER  EKG NSR  SOB at rest presently  HR and BP stable   Iron def anemia stable  CT chest stable; nothing to suggest cause of SOB  BNP normal  Electrolytes normal  US LE normal  Stress test 10/31/24 normal/stable  Cardiac Monitor - 3-15 Day Adult (Cupid Only)  Result Date: 4/4/2025  The patient was monitored for a total of 3d 12h, underlying rhythm was Sinus Rhythm.  The minimum heart rate was 73 bpm; the maximum 122 bpm; the average 88   bpm.  0 % of Atrial fibrillation/Atrial flutter with longest episode of 0 ms.  The total burden of AV Block present was 0 % [Complete Heart Block: 0 %;        No follow-ups on file.    This note was generated with the assistance of ambient listening technology. Verbal consent was obtained by the patient and accompanying visitor(s) for the recording of  patient appointment to facilitate this note. I attest to having reviewed and edited the generated note for accuracy, though some syntax or spelling errors may persist. Please contact the author of this note for any clarification.            [1]   Patient Active Problem List  Diagnosis    Allergic rhinitis    Anemia of chronic disease    Erosive esophagitis    Essential hypertension    History of abnormal cervical Pap smear    History of Helicobacter pylori infection    Lipoma    Multinodular thyroid    Diuretic-induced hypokalemia    Hypomagnesemia    Thrombocytopenia    Liver replaced by transplant    Immunosuppression    At risk for opportunistic infections    Long-term use of immunosuppressant medication    Vitamin D deficiency    Mixed hyperlipidemia    Adhesive capsulitis of both shoulders    Piriformis syndrome of both sides    Spondylosis of lumbar region without myelopathy or radiculopathy    Class 3 severe obesity due to excess calories with serious comorbidity and body mass index (BMI) of 40.0 to 44.9 in adult    Sacroiliac joint dysfunction    JADE (dyspnea on exertion)    Acute stress reaction    BRANNON on CPAP    Gastroesophageal reflux disease with esophagitis without hemorrhage    Piriformis muscle pain    Long term current use of loop diuretic    Iron deficiency anemia    Iron deficiency anemia due to chronic blood loss    Decreased strength, endurance, and mobility    Knee pain, bilateral    Choking    Decreased hearing of both ears    Occipital neuralgia of left side    COVID    Chronic constipation    Chronic nausea    Prediabetes    Lumbar radiculopathy due to degenerative joint disease of spine    Chronic pain syndrome    Folic acid deficiency    Chronic rhinitis   [2]   Current Outpatient Medications:     amitriptyline (ELAVIL) 25 MG tablet, TAKE 1 TO 2 TABLETS(25 TO 50 MG) BY MOUTH EVERY NIGHT AS NEEDED FOR INSOMNIA OR PAIN, Disp: 60 tablet, Rfl: 5    atorvastatin (LIPITOR) 40 MG tablet, Take 1  tablet (40 mg total) by mouth every evening., Disp: 90 tablet, Rfl: 0    cyclobenzaprine (FLEXERIL) 10 MG tablet, TAKE 1/2 TO 1 TABLET BY MOUTH THREE TIMES DAILY AS NEEDED FOR MUSCLE SPASMS, Disp: 90 tablet, Rfl: 5    diclofenac sodium (VOLTAREN) 1 % Gel, Apply to painful joint area four times a day as needed, Disp: 200 g, Rfl: 5    DULoxetine (CYMBALTA) 60 MG capsule, Take 1 capsule (60 mg total) by mouth once daily., Disp: 30 capsule, Rfl: 5    ergocalciferol (ERGOCALCIFEROL) 50,000 unit Cap, Take 1 capsule (50,000 Units total) by mouth every 7 days., Disp: 12 capsule, Rfl: 3    famotidine (PEPCID) 20 MG tablet, Take 1 tablet (20 mg total) by mouth 2 (two) times daily., Disp: 180 tablet, Rfl: 3    fluticasone propionate (FLONASE) 50 mcg/actuation nasal spray, 2 sprays (100 mcg total) by Each Nostril route once daily., Disp: 16 g, Rfl: 11    folic acid (FOLVITE) 1 MG tablet, Take 1 tablet (1 mg total) by mouth once daily., Disp: 90 tablet, Rfl: 3    furosemide (LASIX) 40 MG tablet, Take 1 tablet (40 mg total) by mouth once daily., Disp: 90 tablet, Rfl: 3    levocetirizine (XYZAL) 5 MG tablet, Take 1 tablet (5 mg total) by mouth every evening., Disp: 30 tablet, Rfl: 3    magnesium oxide (MAG-OX) 400 mg (241.3 mg magnesium) tablet, Take 1 tablet (400 mg total) by mouth once daily., Disp: 90 tablet, Rfl: 3    NIFEdipine (PROCARDIA-XL) 60 MG (OSM) 24 hr tablet, Take 1 tablet (60 mg total) by mouth once daily., Disp: 90 tablet, Rfl: 3    nitroGLYCERIN (NITROSTAT) 0.4 MG SL tablet, Place 1 tablet (0.4 mg total) under the tongue every 5 (five) minutes as needed for Chest pain., Disp: 30 tablet, Rfl: 0    pantoprazole (PROTONIX) 40 MG tablet, Take 1 tablet (40 mg total) by mouth 2 (two) times daily., Disp: 180 tablet, Rfl: 1    potassium chloride (KLOR-CON) 10 MEQ TbSR, Take 1 tablet (10 mEq total) by mouth 2 (two) times daily., Disp: 180 tablet, Rfl: 3    pregabalin (LYRICA) 225 MG Cap, Take 1 capsule (225 mg total) by  mouth 2 (two) times daily., Disp: 60 capsule, Rfl: 0    spironolactone (ALDACTONE) 50 MG tablet, Take 1 tablet (50 mg total) by mouth once daily., Disp: 90 tablet, Rfl: 1    tacrolimus (PROGRAF) 0.5 MG Cap, Take 3 capsules (1.5 mg total) by mouth every morning AND 3 capsules (1.5 mg total) every evening., Disp: 180 capsule, Rfl: 11    valsartan (DIOVAN) 80 MG tablet, Take 1 tablet (80 mg total) by mouth once daily. Stop losartan, Disp: 90 tablet, Rfl: 1    ferrous sulfate (FEOSOL) 325 mg (65 mg iron) Tab tablet, Take 1 tablet (325 mg total) by mouth 2 (two) times daily., Disp: 180 tablet, Rfl: 1    ondansetron (ZOFRAN-ODT) 4 MG TbDL, Take 1 tablet (4 mg total) by mouth every 6 (six) hours as needed (nausea/vomiting)., Disp: 20 tablet, Rfl: 0

## 2025-05-07 DIAGNOSIS — G89.29 INSOMNIA SECONDARY TO CHRONIC PAIN: ICD-10-CM

## 2025-05-07 DIAGNOSIS — M50.30 DDD (DEGENERATIVE DISC DISEASE), CERVICAL: ICD-10-CM

## 2025-05-07 DIAGNOSIS — G89.4 CHRONIC PAIN SYNDROME: ICD-10-CM

## 2025-05-07 DIAGNOSIS — M79.18 PIRIFORMIS MUSCLE PAIN: ICD-10-CM

## 2025-05-07 DIAGNOSIS — G47.01 INSOMNIA SECONDARY TO CHRONIC PAIN: ICD-10-CM

## 2025-05-07 DIAGNOSIS — G95.9 CERVICAL MYELOPATHY: ICD-10-CM

## 2025-05-07 DIAGNOSIS — M54.16 LUMBAR RADICULOPATHY: ICD-10-CM

## 2025-05-07 DIAGNOSIS — M54.16 BILATERAL LUMBAR RADICULOPATHY: ICD-10-CM

## 2025-05-08 RX ORDER — AMITRIPTYLINE HYDROCHLORIDE 25 MG/1
TABLET, FILM COATED ORAL
Qty: 60 TABLET | Refills: 5 | OUTPATIENT
Start: 2025-05-08

## 2025-05-08 NOTE — TELEPHONE ENCOUNTER
Pt is requesting for a refill of: amitriptyline (ELAVIL) 25 MG tablet   Last filled:1/12/24  Last encounter: 8/12/25  Up coming apt: 5/26/25  Pharmacy:  [id number] - [place] [address]

## 2025-05-08 NOTE — TELEPHONE ENCOUNTER
----- Message from Shell sent at 5/8/2025  2:38 PM CDT -----  Contact: [phone]  Type:  RX Refill RequestWho Called: GUADALUPE TOMLIN [[id number]]Refill or New Rx:REFILLRX Name and Strength:amitriptyline (ELAVIL) 25 MG tabletHow is the patient currently taking it? (ex. 1XDay):Is this a 30 day or 90 day RX:Preferred Pharmacy with phone number: [phone] Fax: [phone] or Mail Order:LOCALOrdering Provider:AMOL Barrera the patient rather a call back or a response via [facility]? CALL [facility] Call Back Number: [phone] Information: DONNY [id number].... [id number] [place] [address] AT [address] [facility] [id number] [id number]: [phone] Fax: [phone]

## 2025-05-09 RX ORDER — AMITRIPTYLINE HYDROCHLORIDE 25 MG/1
TABLET, FILM COATED ORAL
Qty: 60 TABLET | Refills: 0 | Status: SHIPPED | OUTPATIENT
Start: 2025-05-09

## 2025-05-14 ENCOUNTER — LAB VISIT (OUTPATIENT)
Dept: LAB | Facility: HOSPITAL | Age: 60
End: 2025-05-14
Attending: INTERNAL MEDICINE
Payer: MEDICAID

## 2025-05-14 DIAGNOSIS — D50.9 IRON DEFICIENCY ANEMIA, UNSPECIFIED IRON DEFICIENCY ANEMIA TYPE: ICD-10-CM

## 2025-05-14 LAB
FERRITIN SERPL-MCNC: 85 NG/ML (ref 20–300)
IRON SATN MFR SERPL: 12 % (ref 20–50)
IRON SERPL-MCNC: 39 UG/DL (ref 30–160)
TIBC SERPL-MCNC: 324 UG/DL (ref 250–450)
TRANSFERRIN SERPL-MCNC: 219 MG/DL (ref 200–375)

## 2025-05-14 PROCEDURE — 82728 ASSAY OF FERRITIN: CPT

## 2025-05-14 PROCEDURE — 36415 COLL VENOUS BLD VENIPUNCTURE: CPT

## 2025-05-14 PROCEDURE — 84466 ASSAY OF TRANSFERRIN: CPT

## 2025-05-15 ENCOUNTER — RESULTS FOLLOW-UP (OUTPATIENT)
Dept: CARDIOLOGY | Facility: HOSPITAL | Age: 60
End: 2025-05-15

## 2025-05-15 RX ORDER — FERROUS SULFATE 325(65) MG
325 TABLET ORAL 2 TIMES DAILY
Qty: 180 TABLET | Refills: 1 | Status: SHIPPED | OUTPATIENT
Start: 2025-05-15

## 2025-05-16 ENCOUNTER — TELEPHONE (OUTPATIENT)
Dept: HEMATOLOGY/ONCOLOGY | Facility: CLINIC | Age: 60
End: 2025-05-16
Payer: MEDICAID

## 2025-05-16 NOTE — TELEPHONE ENCOUNTER
Spoke to patient in reference to Hematology referral from Dr. Wick and need to schedule f/u d/t already being an established patient. Appointment scheduled per request next available notice via pt portal.

## 2025-05-18 DIAGNOSIS — G89.4 CHRONIC PAIN SYNDROME: ICD-10-CM

## 2025-05-18 DIAGNOSIS — M79.18 PIRIFORMIS MUSCLE PAIN: ICD-10-CM

## 2025-05-18 DIAGNOSIS — G47.01 INSOMNIA SECONDARY TO CHRONIC PAIN: ICD-10-CM

## 2025-05-18 DIAGNOSIS — M54.16 BILATERAL LUMBAR RADICULOPATHY: ICD-10-CM

## 2025-05-18 DIAGNOSIS — G89.29 INSOMNIA SECONDARY TO CHRONIC PAIN: ICD-10-CM

## 2025-05-18 DIAGNOSIS — G95.9 CERVICAL MYELOPATHY: ICD-10-CM

## 2025-05-18 DIAGNOSIS — M54.16 LUMBAR RADICULOPATHY: ICD-10-CM

## 2025-05-18 DIAGNOSIS — M50.30 DDD (DEGENERATIVE DISC DISEASE), CERVICAL: ICD-10-CM

## 2025-05-19 RX ORDER — PREGABALIN 225 MG/1
CAPSULE ORAL
Qty: 60 CAPSULE | Refills: 2 | Status: SHIPPED | OUTPATIENT
Start: 2025-05-19

## 2025-05-19 NOTE — TELEPHONE ENCOUNTER
Good Morning/Afternoon,     Pt is requesting for a refill of: pregabalin (LYRICA) 225 MG Cap   Last filed: 04/07/2025  Last encounter:  08/12/2024  Up coming apt: 05/26/2025  Pharmacy: [facility] #[id number] - [place], [place] - [address] AT [facility] & [address] [address] [facility]     Medical Assistant  Sumi LOPEZ (CCMA, Interventional Pain Management Surgery Scheduler)

## 2025-05-26 ENCOUNTER — OFFICE VISIT (OUTPATIENT)
Dept: PAIN MEDICINE | Facility: CLINIC | Age: 60
End: 2025-05-26
Payer: MEDICAID

## 2025-05-26 VITALS — HEIGHT: 65 IN | BODY MASS INDEX: 38.63 KG/M2

## 2025-05-26 DIAGNOSIS — G47.01 INSOMNIA SECONDARY TO CHRONIC PAIN: ICD-10-CM

## 2025-05-26 DIAGNOSIS — M54.16 BILATERAL LUMBAR RADICULOPATHY: ICD-10-CM

## 2025-05-26 DIAGNOSIS — G89.4 CHRONIC PAIN SYNDROME: ICD-10-CM

## 2025-05-26 DIAGNOSIS — M79.18 PIRIFORMIS MUSCLE PAIN: ICD-10-CM

## 2025-05-26 DIAGNOSIS — M54.16 LUMBAR RADICULOPATHY: ICD-10-CM

## 2025-05-26 DIAGNOSIS — G89.29 INSOMNIA SECONDARY TO CHRONIC PAIN: ICD-10-CM

## 2025-05-26 DIAGNOSIS — G95.9 CERVICAL MYELOPATHY: ICD-10-CM

## 2025-05-26 DIAGNOSIS — M46.1 SACROILIITIS: Primary | ICD-10-CM

## 2025-05-26 DIAGNOSIS — M50.30 DDD (DEGENERATIVE DISC DISEASE), CERVICAL: ICD-10-CM

## 2025-05-26 PROCEDURE — 1159F MED LIST DOCD IN RCRD: CPT | Mod: CPTII,95,, | Performed by: PHYSICIAN ASSISTANT

## 2025-05-26 PROCEDURE — 98006 SYNCH AUDIO-VIDEO EST MOD 30: CPT | Mod: 95,,, | Performed by: PHYSICIAN ASSISTANT

## 2025-05-26 PROCEDURE — G2211 COMPLEX E/M VISIT ADD ON: HCPCS | Mod: 95,,, | Performed by: PHYSICIAN ASSISTANT

## 2025-05-26 PROCEDURE — 3044F HG A1C LEVEL LT 7.0%: CPT | Mod: CPTII,95,, | Performed by: PHYSICIAN ASSISTANT

## 2025-05-26 PROCEDURE — 4010F ACE/ARB THERAPY RXD/TAKEN: CPT | Mod: CPTII,95,, | Performed by: PHYSICIAN ASSISTANT

## 2025-05-26 PROCEDURE — 1160F RVW MEDS BY RX/DR IN RCRD: CPT | Mod: CPTII,95,, | Performed by: PHYSICIAN ASSISTANT

## 2025-05-26 RX ORDER — AMITRIPTYLINE HYDROCHLORIDE 25 MG/1
TABLET, FILM COATED ORAL
Qty: 60 TABLET | Refills: 5 | Status: SHIPPED | OUTPATIENT
Start: 2025-05-26

## 2025-05-26 NOTE — PROGRESS NOTES
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 patient reports having stool incontinence with some leakage  GENITOURINARY: patient denies having any abnormal bladder function    MUSCULOSKELETAL:  - patient complains of the above noted pain/s (see chief pain complaint)    NEUROLOGICAL:   - pain as above  - strength in Lower extremities is intact, BILATERALLY  - sensation in Lower extremities is intact, BILATERALLY  - patient reports having stool incontinence     PSYCHIATRIC: patient denies any change in mood    Other:  All other systems reviewed and are negative          Telemedicine Physical Exam:    Vitals:    05/26/25 0828   Height: 5' 5" (1.651 m)   Body mass index is 38.63 kg/m².   (reviewed on 5/26/2025)     GENERAL: Well appearing, in no acute distress, alert and oriented x3.    PSYCH:  Mood and affect appropriate.  SKIN: Skin color & texture with no obvious abnormalities.    HEAD/FACE:  Normocephalic, atraumatic.    PULM:  No difficulty breathing.   GI: no obvious distention.   EXTREMITIES: No obvious deformities.    MUSCULOSKELETAL: No obvious atrophy abnormalities are noted.   GAIT: sitting.        Assessment  1. 59 y.o. year old patient presenting with       ICD-10-CM ICD-9-CM    1. Sacroiliitis  M46.1 720.2       2. Cervical myelopathy  G95.9 721.1 amitriptyline (ELAVIL) 25 MG tablet      3. Chronic pain syndrome  G89.4 338.4 amitriptyline (ELAVIL) 25 MG tablet      4. Lumbar radiculopathy  " M54.16 724.4 amitriptyline (ELAVIL) 25 MG tablet      5. DDD (degenerative disc disease), cervical  M50.30 722.4 amitriptyline (ELAVIL) 25 MG tablet      6. Insomnia secondary to chronic pain  G89.29 338.29 amitriptyline (ELAVIL) 25 MG tablet    G47.01 327.01       7. Bilateral lumbar radiculopathy  M54.16 724.4 amitriptyline (ELAVIL) 25 MG tablet      8. Piriformis muscle pain  M79.18 729.1 amitriptyline (ELAVIL) 25 MG tablet          Plan:  -Interventional:   - Patient received a right SI joint injection on 3/27/2025 and 5/12/25 with Neurosurgery.  Patient received a right sacroiliac joint injection on March 27th, which provided minimal benefit for a few days. She underwent a second injection without cortisone approximately two weeks ago (around May 12th), which did not provide benefit and caused pain with movement. Neurosurgeon mentioned potential for placing 2 screws in the affected area if injections prove effective.    - S/p right L5/S1 + S1 TF WILBERTO on 8/15/23 with limited pain relief.   - S/p Right SIJ + Right piriformis + Right GT bursa injection on 4/18/23 with limited pain relief   - S/p L4/5 IL WILBERTO (with right paramedian approach) on 2/28/23 (4 weeks ago) with limited pain relief.  - S/p right SIJ + right piriformis + right GT bursa injection on 10/11/22 with 75% pain relief, still reporting 9/10 pain.   - S/p Bilateral L4/5 TF WILBERTO on 8/18/22 with 85% pain relief - now localized right SIJ pain.   - S/p bilateral SIJ + bilateral GT bursa + bilateral piriformis injection on 4/21/22 with limited pain relief.  - S/p C5/6 IL WILBERTO on 1/4/22 with 100% pain relief   - S/p bilateral L4/5 TF WILBERTO on 10/28/21 with 90% pain relief   - S/p Bilateral SIJ + Bilateral Piriformis + Bilateral GT Bursa Injection on 4/15/21 with 75% pain relief - but continuing to have leg pain.  - S/p bilateral GT bursa injection on 02/08/2021 with 10% pain relief with Dr. Kaminski.  She feels pain is worse than prior to the procedure.  - S/p  right SIJ + piriformis + GT bursa injection on 10/21/20 with 90% pain relief.     - Anticoagulation use: None.     -Pharmacologic:   - Refill Lyrica 225mg BID x 6 months.   - Refill amitriptyline 50mg QHS.  - Refill Flexeril 10mg TID PRN.  - Continue Cymbalta 60mg QD - from PCP.   - No NSAIDs due to h/o transplant.     - Failed medications: naltrexone (too expensive)    - LA  reviewed and appropriate.         -Rehabilitative: Patient is experiencing significant difficulty with mobility and daily activities. She is unable to perform basic self-care tasks independently and stays at home most of the time due to pain. Her condition is severely impacting her ability to work or perform regular activities.    -Diagnostic/ Imaging: No new imaging ordered. Previous imaging reviewed.     -Consult/ Referral:   - Continue follow up with [facility] - last seen on 4/11/25 with plans to repeat right SIJ injection, which was completed around May 12th, about 2 weeks ago.  Patient received a right SI joint injection on 3/27/2025 and 5/12/25.   - Continue follow up with [facility] for left knee pain.     -Follow up:    - 6 months follow-up for medication refill - virtual visit   - Follow up with neurosurgery team for telehealth appointment this week to discuss treatment plan      Future Appointments   Date Time Provider Department Center   5/28/2025  9:00 AM rEi Mathias NP [facility]   6/16/2025  8:00 AM LABORATORY, V [facility] [place]   8/13/2025  1:30 PM Silvana Lee PA-C [facility] PULMSVC [place]   8/19/2025  9:00 AM Olman Wick MD [facility]   8/22/2025  2:15 PM Dionne Bolaños MD [facility] OPHTHAL [place]   2/10/2026 12:20 PM [facility] MAMMO2 [facility] [place]   3/25/2026  9:30 AM Silvana Lee PA-C [facility] PULMS[facility] [place]       Visit today included increased complexity associated with the care of the episodic problem of chronic pain which was  addressed and continue to manage the longitudinal care of the patient due to the serious and/or complex managed problem(s) listed above.    - Patient Questions: Answered all of the patient's questions regarding diagnosis, therapy, and treatment.    - This condition does not require this patient to take time off of work, and the primary goal of our Pain Management services is to improve the patient's functional capacity.   - I discussed the risks, benefits, and alternatives to potential treatment options. All questions and concerns were fully addressed today in clinic.         Emilee Buitrago PA-C  Interventional Pain Management - [facility]    Disclaimer:  This note was prepared using voice recognition system and is likely to have sound alike errors that may have been overlooked even after proof reading.  Please call me with any questions.     This note was generated with the assistance of ambient listening technology. Recording of patient appointment was utlized to facilitate this note. I attest to having reviewed and edited the generated note for accuracy, though some syntax or spelling errors may persist. Please contact the author of this note for any clarification.

## 2025-05-28 ENCOUNTER — OFFICE VISIT (OUTPATIENT)
Dept: HEMATOLOGY/ONCOLOGY | Facility: CLINIC | Age: 60
End: 2025-05-28
Payer: MEDICAID

## 2025-05-28 DIAGNOSIS — E61.1 IRON DEFICIENCY: Primary | ICD-10-CM

## 2025-05-28 RX ORDER — SODIUM FERRIC GLUCONATE COMPLEX IN SUCROSE 12.5 MG/ML
125 INJECTION INTRAVENOUS
OUTPATIENT
Start: 2025-05-28

## 2025-05-28 RX ORDER — SODIUM CHLORIDE 0.9 % (FLUSH) 0.9 %
10 SYRINGE (ML) INJECTION
OUTPATIENT
Start: 2025-05-28

## 2025-05-28 RX ORDER — HEPARIN 100 UNIT/ML
500 SYRINGE INTRAVENOUS
OUTPATIENT
Start: 2025-05-28

## 2025-05-28 RX ORDER — EPINEPHRINE 0.3 MG/.3ML
0.3 INJECTION SUBCUTANEOUS ONCE AS NEEDED
OUTPATIENT
Start: 2025-05-28

## 2025-05-28 NOTE — PROGRESS NOTES
Subjective:       Patient ID: Marcie Bazan is a 59 y.o. female.    Chief Complaint: follow up iron deficiency     HPI: 59 y.o female with history of arthritis, alcohol abuse with end-stage liver disease (s/p liver transplant 12/2017), HTN, HLD, questionable liver cancer status post liver transplant in 2017, iron deficiency and anemia, thrombocytopenia.     Presenting today as recommended by Dr. Wick for evaluation and management of iron deficiency. Review of medical record reveals persistent mild iron deficiency. No anemia. She notes taking oral iron for the past few months. She notes fatigue, JADE.     Most recent Colonoscopy 2020, recommended repeat in 7 years. EGD 9/2023 unremarkable.      SSocial History[1]    Past Medical History:   Diagnosis Date    Alcohol abuse     Alcoholic hepatitis     Anemia of chronic disease     Arthritis     generialized    Cancer 12/26/2017    liver    Coagulopathy     Dyspnea on exertion 03/26/2020    Encounter for blood transfusion     End stage liver disease     History of alcohol abuse     Hyperlipidemia     Hypersomnia 09/11/2020    Hypertension     Hyponatremia     Liver cirrhosis     Liver transplant candidate 12/26/2017    Obesity (BMI 30-39.9) 01/05/2016    Prediabetes 04/22/2023    Sleep apnea        Family History   Problem Relation Name Age of Onset    Hypertension Mother      Alzheimer's disease Mother      Hypertension Father      Diabetes Father      Diabetes Sister      Ovarian cancer Paternal Aunt      Depression Maternal Grandfather         Past Surgical History:   Procedure Laterality Date    BILATERAL SALPINGO-OOPHORECTOMY (BSO) Bilateral 1986    with hysterectomy done for abnormal cells    BREAST BIOPSY Left     benign    BREAST LUMPECTOMY      patient is unsure of date or laterality    CATARACT EXTRACTION W/  INTRAOCULAR LENS IMPLANT Right 9/20/2023    Procedure: EXTRACTION, CATARACT, WITH IOL INSERTION;  Surgeon: Kim Mullen  MD KATJA;  Location: [facility] OR;  Service: Ophthalmology;  Laterality: Right;  Per Elidia Viveros change patient from left eye to right on 9/11 @ 2:38p    CATARACT EXTRACTION W/  INTRAOCULAR LENS IMPLANT Left 10/11/2023    Procedure: EXTRACTION, CATARACT, WITH IOL INSERTION;  Surgeon: Kim Mullen MD;  Location: [facility] OR;  Service: Ophthalmology;  Laterality: Left;  Per Idalia Rosa change the patient to Left and to 10/11 on 9/8 @8:49Am    CHOLECYSTECTOMY      COLONOSCOPY N/A 12/01/2017    Procedure: COLONOSCOPY;  Surgeon: Filemon Fuentes MD;  Location: [facility] (2ND FLR);  Service: Endoscopy;  Laterality: N/A;    COLONOSCOPY N/A 12/05/2017    Procedure: COLONOSCOPY;  Surgeon: José Chavira MD;  Location: [facility] (2ND FLR);  Service: Endoscopy;  Laterality: N/A;    COLONOSCOPY N/A 12/12/2022    Procedure: COLONOSCOPY;  Surgeon: Gogo Brown MD;  Location: [facility] ENDO;  Service: Endoscopy;  Laterality: N/A;    EPIDURAL STEROID INJECTION N/A 2/28/2023    Procedure: L4/5 IL WILBERTO (with right paramedian approach);  Surgeon: Ras Caballero MD;  Location: [facility] PAIN MGT;  Service: Pain Management;  Laterality: N/A;    EPIDURAL STEROID INJECTION INTO CERVICAL SPINE N/A 01/04/2022    Procedure: C5/6 IL WILBERTO;  Surgeon: Ras Caballero MD;  Location: [facility] PAIN MGT;  Service: Pain Management;  Laterality: N/A;    ESOPHAGOGASTRODUODENOSCOPY N/A 9/14/2023    Procedure: EGD (ESOPHAGOGASTRODUODENOSCOPY);  Surgeon: Angie Zimmerman MD;  Location: [facility] ENDO;  Service: Endoscopy;  Laterality: N/A;    EXAMINATION UNDER ANESTHESIA N/A 02/18/2020    Procedure: EXAM UNDER ANESTHESIA;  Surgeon: Alden Horowitz MD;  Location: [facility] OR;  Service: General;  Laterality: N/A;    EXCISIONAL HEMORRHOIDECTOMY N/A 02/18/2020    Procedure: HEMORRHOIDECTOMY;  Surgeon: Alden Horowitz MD;  Location: [facility] OR;  Service: General;  Laterality: N/A;    HYSTERECTOMY  1986    due to abnormal paps - ovaries were removed     INJECTION OF ANESTHETIC AGENT AROUND PUDENDAL NERVE N/A 02/18/2020    Procedure: BLOCK, NERVE, PUDENDAL;  Surgeon: Alden Horowitz MD;  Location: [facility];  Service: General;  Laterality: N/A;    INJECTION OF ANESTHETIC AGENT INTO SACROILIAC JOINT Right 06/14/2019    Procedure: right Sacroiliac Joint Injection;  Surgeon: David Desai MD;  Location: [facility] PAIN MGT;  Service: Pain Management;  Laterality: Right;    INJECTION OF ANESTHETIC AGENT INTO SACROILIAC JOINT Bilateral 02/07/2020    Procedure: Bilateral GT bursa + Bilateral Sacroiliac Joint Injection;  Surgeon: David Desai MD;  Location: [facility] PAIN MGT;  Service: Pain Management;  Laterality: Bilateral;    INJECTION OF ANESTHETIC AGENT INTO SACROILIAC JOINT Bilateral 07/07/2020    Procedure: Bilateral GT bursa +SIJ injection;  Surgeon: David Desai MD;  Location: [facility] PAIN MGT;  Service: Pain Management;  Laterality: Bilateral;    INJECTION OF ANESTHETIC AGENT INTO SACROILIAC JOINT Right 10/21/2020    Procedure: Right piriformis + right SIJ + right GT bursa injection;  Surgeon: David Desai MD;  Location: [facility] PAIN MGT;  Service: Pain Management;  Laterality: Right;    INJECTION OF ANESTHETIC AGENT INTO SACROILIAC JOINT Bilateral 04/15/2021    Procedure: Bilateral SIJ + Bilateral Piriformis + Bilateral GT Bursa Injection;  Surgeon: Flaco Frazier MD;  Location: [facility] PAIN MGT;  Service: Pain Management;  Laterality: Bilateral;    INJECTION OF ANESTHETIC AGENT INTO SACROILIAC JOINT Bilateral 04/21/2022    Procedure: Bilateral SIJ + Bilateral Piriformis + Bilateral GT Bursa Injection;  Surgeon: Ras Caballero MD;  Location: [facility] PAIN MGT;  Service: Pain Management;  Laterality: Bilateral;    INJECTION OF ANESTHETIC AGENT INTO SACROILIAC JOINT Right 4/18/2023    Procedure: Right SIJ + Right piriformis +/- Right GT bursa injection;  Surgeon: Ras Caballero MD;  [facility];  Service: Pain Management;  Laterality: Right;     INJECTION OF JOINT Bilateral 02/07/2020    Procedure: Bilateral GT bursa + Bilateral Sacroiliac Joint Injection;  Surgeon: David Desai MD;  Location: [facility] PAIN MGT;  Service: Pain Management;  Laterality: Bilateral;    INJECTION OF JOINT Bilateral 07/07/2020    Procedure: Bilateral GT bursa +SIJ injection;  Surgeon: David Desai MD;  Location: [facility] PAIN MGT;  Service: Pain Management;  Laterality: Bilateral;    INJECTION OF JOINT Right 10/21/2020    Procedure: Right piriformis + right SIJ + right GT bursa injection;  Surgeon: David Desai MD;  Location: [facility] PAIN MGT;  Service: Pain Management;  Laterality: Right;    INJECTION OF JOINT Bilateral 02/08/2021    Procedure: Bilateral GT Bursa Injection;  Surgeon: Anna Kaminski MD;  Location: [facility] PAIN MGT;  Service: Pain Management;  Laterality: Bilateral;    INJECTION OF JOINT Bilateral 04/15/2021    Procedure: Bilateral SIJ + Bilateral Piriformis + Bilateral GT Bursa Injection;  Surgeon: Flaco Frazier MD;  Location: [facility] PAIN MGT;  Service: Pain Management;  Laterality: Bilateral;    INJECTION OF JOINT Bilateral 04/21/2022    Procedure: Bilateral SIJ + Bilateral Piriformis + Bilateral GT Bursa Injection;  Surgeon: Ras Caballero MD;  Location: [facility] PAIN MGT;  Service: Pain Management;  Laterality: Bilateral;    INJECTION OF JOINT Right 10/11/2022    Procedure: right SIJ + right piriformis + right GT bursa injection;  Surgeon: Ras Caballero MD;  Location: [facility] PAIN MGT;  Service: Pain Management;  Laterality: Right;    INJECTION OF PIRIFORMIS MUSCLE Right 06/14/2019    Procedure: Right piriformis injection;  Surgeon: David Desai MD;  Location: [facility] PAIN MGT;  Service: Pain Management;  Laterality: Right;    INJECTION OF PIRIFORMIS MUSCLE Right 10/21/2020    Procedure: Right piriformis + right SIJ + right GT bursa injection;  Surgeon: David Desai MD;  Location: [facility] PAIN MGT;  Service: Pain Management;  Laterality: Right;     INJECTION OF PIRIFORMIS MUSCLE Bilateral 04/15/2021    Procedure: Bilateral SIJ + Bilateral Piriformis + Bilateral GT Bursa Injection;  Surgeon: Flaco Frazier MD;  Location: [facility];  Service: Pain Management;  Laterality: Bilateral;    INJECTION OF PIRIFORMIS MUSCLE Bilateral 04/21/2022    Procedure: Bilateral SIJ + Bilateral Piriformis + Bilateral GT Bursa Injection;  Surgeon: Ras Caballero MD;  Location: [facility] PAIN [facility]GT;  Service: Pain Management;  Laterality: Bilateral;    LIVER TRANSPLANT  12/2017    SELECTIVE INJECTION OF ANESTHETIC AGENT AROUND LUMBAR SPINAL NERVE ROOT BY TRANSFORAMINAL APPROACH Bilateral 10/28/2021    Procedure: BLOCK, SPINAL NERVE ROOT, LUMBAR, SELECTIVE, TRANSFORAMINAL APPROACH bilateral L4-5 and Right Knee injection with RN IV sedation;  Surgeon: Flaco Frazier MD;  Location: [facility];  Service: Pain Management;  Laterality: Bilateral;    SELECTIVE INJECTION OF ANESTHETIC AGENT AROUND LUMBAR SPINAL NERVE ROOT BY TRANSFORAMINAL APPROACH Bilateral 08/18/2022    Procedure: Bilateral L4/5 TF WILBERTO;  Surgeon: Ras Caballero MD;  Location: [facility];  Service: Pain Management;  Laterality: Bilateral;    TRANSFORAMINAL EPIDURAL INJECTION OF STEROID Right 8/15/2023    Procedure: right L5/S1 + S1 TF WILBERTO;  Surgeon: Ras Caballero MD;  Location: [facility];  Service: Pain Management;  Laterality: Right;       Review of Systems   Constitutional:  Negative for appetite change and unexpected weight change.   HENT:  Negative for mouth sores.    Eyes:  Positive for visual disturbance.   Respiratory:  Positive for shortness of breath. Negative for cough.    Cardiovascular:  Positive for chest pain.   Gastrointestinal:  Negative for abdominal pain and diarrhea.   Genitourinary:  Negative for frequency.   Musculoskeletal:  Positive for back pain.   Skin:  Negative for rash.   Neurological:  Positive for headaches.   Hematological:  Negative for adenopathy.    Psychiatric/Behavioral:  The patient is not nervous/anxious.          Medication List with Changes/Refills   Current Medications    AMITRIPTYLINE (ELAVIL) 25 MG TABLET    TAKE 1 TO 2 TABLETS(25 TO 50 MG) BY MOUTH EVERY NIGHT AS NEEDED FOR INSOMNIA OR PAIN    ATORVASTATIN (LIPITOR) 40 MG TABLET    Take 1 tablet (40 mg total) by mouth every evening.    CYCLOBENZAPRINE (FLEXERIL) 10 MG TABLET    TAKE 1/2 TO 1 TABLET BY MOUTH THREE TIMES DAILY AS NEEDED FOR MUSCLE SPASMS    DICLOFENAC SODIUM (VOLTAREN) 1 % GEL    Apply to painful joint area four times a day as needed    DULOXETINE (CYMBALTA) 60 MG CAPSULE    Take 1 capsule (60 mg total) by mouth once daily.    ERGOCALCIFEROL (ERGOCALCIFEROL) 50,000 UNIT CAP    Take 1 capsule (50,000 Units total) by mouth every 7 days.    FAMOTIDINE (PEPCID) 20 MG TABLET    Take 1 tablet (20 mg total) by mouth 2 (two) times daily.    FERROUS SULFATE (FEOSOL) 325 MG (65 MG IRON) TAB TABLET    Take 1 tablet (325 mg total) by mouth 2 (two) times daily.    FLUTICASONE PROPIONATE (FLONASE) 50 MCG/ACTUATION NASAL SPRAY    2 sprays (100 mcg total) by Each Nostril route once daily.    FOLIC ACID (FOLVITE) 1 MG TABLET    Take 1 tablet (1 mg total) by mouth once daily.    FUROSEMIDE (LASIX) 40 MG TABLET    Take 1 tablet (40 mg total) by mouth once daily.    LEVOCETIRIZINE (XYZAL) 5 MG TABLET    Take 1 tablet (5 mg total) by mouth every evening.    MAGNESIUM OXIDE (MAG-OX) 400 MG (241.3 MG MAGNESIUM) TABLET    Take 1 tablet (400 mg total) by mouth once daily.    NIFEDIPINE (PROCARDIA-XL) 60 MG (OSM) 24 HR TABLET    Take 1 tablet (60 mg total) by mouth once daily.    NITROGLYCERIN (NITROSTAT) 0.4 MG SL TABLET    Place 1 tablet (0.4 mg total) under the tongue every 5 (five) minutes as needed for Chest pain.    ONDANSETRON (ZOFRAN-ODT) 4 MG TBDL    Take 1 tablet (4 mg total) by mouth every 6 (six) hours as needed (nausea/vomiting).    PANTOPRAZOLE (PROTONIX) 40 MG TABLET    Take 1 tablet (40 mg  total) by mouth 2 (two) times daily.    POTASSIUM CHLORIDE (KLOR-CON) 10 MEQ TBSR    Take 1 tablet (10 mEq total) by mouth 2 (two) times daily.    PREGABALIN (LYRICA) 225 MG CAP    TAKE 1 CAPSULE(225 MG) BY MOUTH TWICE DAILY    SPIRONOLACTONE (ALDACTONE) 50 MG TABLET    Take 1 tablet (50 mg total) by mouth once daily.    TACROLIMUS (PROGRAF) 0.5 MG CAP    Take 3 capsules (1.5 mg total) by mouth every morning AND 3 capsules (1.5 mg total) every evening.    VALSARTAN (DIOVAN) 80 MG TABLET    Take 1 tablet (80 mg total) by mouth once daily. Stop losartan     Objective:   There were no vitals filed for this visit.  Lab Results   Component Value Date    WBC 13.79 (H) 04/22/2025    HGB 13.9 04/22/2025    HCT 42.6 04/22/2025    MCV 84 04/22/2025     04/22/2025       BMP  Lab Results   Component Value Date     03/18/2025    K 4.0 03/18/2025     03/18/2025    CO2 21 (L) 03/18/2025    BUN 14 03/18/2025    CREATININE 1.2 03/18/2025    CALCIUM 9.3 03/18/2025    ANIONGAP 13 03/18/2025    EGFRNORACEVR 52 (A) 03/18/2025     Lab Results   Component Value Date    ALT 39 03/18/2025    AST 24 03/18/2025    GGT 26 02/24/2025    ALKPHOS 152 (H) 03/18/2025    BILITOT 0.4 03/18/2025     Lab Results   Component Value Date    IRON 39 05/14/2025    TRANSFERRIN 219 05/14/2025    TIBC 324 05/14/2025    LABIRON 12 (L) 05/14/2025    FESATURATED 15 (L) 01/02/2025      Lab Results   Component Value Date    VHPIJEYV00 641 04/22/2025     Lab Results   Component Value Date    FOLATE 14.2 04/22/2025       Physical Exam  Pulmonary:      Effort: Pulmonary effort is normal. No respiratory distress.   Neurological:      Mental Status: She is alert and oriented to person, place, and time.        Assessment:     Problem List Items Addressed This Visit          Oncology    Iron deficiency - Primary    Saturated iron 12%. No anemia. She notes fatigue, JADE. Taking oral iron BID. She notes constipation    Stop oral iron. Arrange weekly  ferrlecit x 4. F/u 4 weeks after final iron with cbc, cmp, iron, ferritin 1-2 days prior         Relevant Orders    CBC Auto Differential    Iron and TIBC    Ferritin         Plan:     Iron deficiency  -     CBC Auto Differential; Future; Expected date: 05/28/2025  -     Iron and TIBC; Future; Expected date: 05/28/2025  -     Ferritin; Future; Expected date: 05/28/2025    Other orders  -     ferric gluconate (Ferrlecit) 62.5 mg/5 mL injection 125 mg  -     EPINEPHrine (EPIPEN) 0.3 mg/0.3 mL pen injection 0.3 mg  -     hydrocortisone sodium succinate injection 100 mg  -     0.9% NaCl 250 mL flush bag  -     sodium chloride 0.9% flush 10 mL  -     heparin, porcine (PF) 100 unit/mL injection flush 500 Units  -     alteplase injection 2 mg          I will review assessment/plan with collaborating physician Dr. Evan Mathias, FNP-C    The patient location is: [place]  The chief complaint leading to consultation is: fatigue, JADE    Visit type: audiovisual    Face to Face time with patient: 13 minutes  30 minutes of total time spent on the encounter, which includes face to face time and non-face to face time preparing to see the patient (eg, review of tests), Obtaining and/or reviewing separately obtained history, Documenting clinical information in the electronic or other health record, Independently interpreting results (not separately reported) and communicating results to the patient/family/caregiver, or Care coordination (not separately reported).         Each patient to whom he or she provides medical services by telemedicine is:  (1) informed of the relationship between the physician and patient and the respective role of any other health care provider with respect to management of the patient; and (2) notified that he or she may decline to receive medical services by telemedicine and may withdraw from such care at any time.      Med Onc Chart Routing      Follow up with physician    Follow up with  [facility] Other. 4 weeks after final iron   Infusion scheduling note   weekly ferrlecit x 4   Injection scheduling note    Labs CBC, ferritin and iron and TIBC   Scheduling:  Preferred lab:  Lab interval:  1-2 days prior   Imaging None      Pharmacy appointment No pharmacy appointment needed      Other referrals No nutrition appointment needed -        No additional referrals needed                                        [1]  Social History  Socioeconomic History    Marital status: Single    Number of children: 2   Tobacco Use    Smoking status: Never    Smokeless tobacco: Never   Substance and Sexual Activity    Alcohol use: No     Comment: Currently admitted to inpatient rehab 7/2017    Drug use: No    Sexual activity: Not Currently     Social Drivers of Health     Financial Resource Strain: High Risk (6/26/2024)    Overall Financial Resource Strain (CARDIA)     Difficulty of Paying Living Expenses: Hard   Food Insecurity: Food Insecurity Present (6/26/2024)    Hunger Vital Sign     Worried About Running Out of Food in the Last Year: Often true   Physical Activity: Unknown (6/26/2024)    Exercise Vital Sign     Days of Exercise per Week: 0 days   Stress: No Stress Concern Present (5/14/2024)    [employer] [place] [employer] - Occupational Stress Questionnaire     Feeling of Stress : Not at all   Housing Stability: High Risk (6/26/2024)    Housing Stability Vital Sign     Unable to Pay for Housing in the Last Year: Yes

## 2025-05-29 ENCOUNTER — PATIENT MESSAGE (OUTPATIENT)
Dept: INFUSION THERAPY | Facility: HOSPITAL | Age: 60
End: 2025-05-29
Payer: MEDICAID

## 2025-06-06 ENCOUNTER — PATIENT MESSAGE (OUTPATIENT)
Dept: OTHER | Facility: OTHER | Age: 60
End: 2025-06-06
Payer: MEDICAID

## 2025-06-09 RX ORDER — PREGABALIN 225 MG/1
225 CAPSULE ORAL 2 TIMES DAILY
Qty: 60 CAPSULE | Refills: 11 | Status: SHIPPED | OUTPATIENT
Start: 2025-06-09

## 2025-06-10 DIAGNOSIS — K21.00 GASTROESOPHAGEAL REFLUX DISEASE WITH ESOPHAGITIS WITHOUT HEMORRHAGE: ICD-10-CM

## 2025-06-10 RX ORDER — FAMOTIDINE 20 MG/1
20 TABLET, FILM COATED ORAL 2 TIMES DAILY
Qty: 180 TABLET | Refills: 3 | OUTPATIENT
Start: 2025-06-10

## 2025-06-10 NOTE — TELEPHONE ENCOUNTER
Refill Decision Note   Marcie Bazan  is requesting a refill authorization.  Brief Assessment and Rationale for Refill:  Quick Discontinue     Medication Therapy Plan:  Refused Today (6/10/2025): Patient has requested refill too soon      Comments:     Note composed:12:20 PM 06/10/2025

## 2025-06-10 NOTE — TELEPHONE ENCOUNTER
No care due was identified.  [facility] Embedded Care Due Messages. Reference number: [id number].   6/10/2025 10:03:51 AM CDT

## 2025-06-12 ENCOUNTER — TELEPHONE (OUTPATIENT)
Dept: INFUSION THERAPY | Facility: HOSPITAL | Age: 60
End: 2025-06-12
Payer: MEDICAID

## 2025-06-12 ENCOUNTER — TELEPHONE (OUTPATIENT)
Dept: TRANSPLANT | Facility: CLINIC | Age: 60
End: 2025-06-12
Payer: MEDICAID

## 2025-06-17 ENCOUNTER — TELEPHONE (OUTPATIENT)
Dept: TRANSPLANT | Facility: CLINIC | Age: 60
End: 2025-06-17
Payer: MEDICAID

## 2025-06-17 NOTE — TELEPHONE ENCOUNTER
Copied from CRM #[id number]. Topic: Appointments - Appointment Rescheduling  >> Jun 17, 2025  9:52 AM Christi wrote:  R.s labs from Monday to Thursday 6/19 prior to infusion    Patient can be contacted @# [phone]

## 2025-06-19 ENCOUNTER — LAB VISIT (OUTPATIENT)
Dept: LAB | Facility: HOSPITAL | Age: 60
End: 2025-06-19
Attending: INTERNAL MEDICINE
Payer: MEDICAID

## 2025-06-19 ENCOUNTER — INFUSION (OUTPATIENT)
Dept: INFUSION THERAPY | Facility: HOSPITAL | Age: 60
End: 2025-06-19
Payer: MEDICAID

## 2025-06-19 VITALS
DIASTOLIC BLOOD PRESSURE: 66 MMHG | TEMPERATURE: 97 F | RESPIRATION RATE: 18 BRPM | OXYGEN SATURATION: 96 % | SYSTOLIC BLOOD PRESSURE: 115 MMHG | HEART RATE: 84 BPM

## 2025-06-19 DIAGNOSIS — Z94.4 LIVER REPLACED BY TRANSPLANT: ICD-10-CM

## 2025-06-19 DIAGNOSIS — E61.1 IRON DEFICIENCY: Primary | ICD-10-CM

## 2025-06-19 LAB
ABSOLUTE EOSINOPHIL (OHS): 0.37 K/UL
ABSOLUTE MONOCYTE (OHS): 1.03 K/UL (ref 0.3–1)
ABSOLUTE NEUTROPHIL COUNT (OHS): 7.12 K/UL (ref 1.8–7.7)
ALBUMIN SERPL BCP-MCNC: 3.7 G/DL (ref 3.5–5.2)
ALP SERPL-CCNC: 151 UNIT/L (ref 40–150)
ALT SERPL W/O P-5'-P-CCNC: 13 UNIT/L (ref 10–44)
ANION GAP (OHS): 12 MMOL/L (ref 8–16)
AST SERPL-CCNC: 11 UNIT/L (ref 11–45)
BASOPHILS # BLD AUTO: 0.06 K/UL
BASOPHILS NFR BLD AUTO: 0.5 %
BILIRUB SERPL-MCNC: 0.4 MG/DL (ref 0.1–1)
BUN SERPL-MCNC: 15 MG/DL (ref 8–23)
CALCIUM SERPL-MCNC: 9.5 MG/DL (ref 8.7–10.5)
CHLORIDE SERPL-SCNC: 100 MMOL/L (ref 95–110)
CO2 SERPL-SCNC: 25 MMOL/L (ref 23–29)
CREAT SERPL-MCNC: 1.3 MG/DL (ref 0.5–1.4)
ERYTHROCYTE [DISTWIDTH] IN BLOOD BY AUTOMATED COUNT: 14.8 % (ref 11.5–14.5)
GFR SERPLBLD CREATININE-BSD FMLA CKD-EPI: 47 ML/MIN/1.73/M2
GLUCOSE SERPL-MCNC: 108 MG/DL (ref 70–110)
HCT VFR BLD AUTO: 43 % (ref 37–48.5)
HGB BLD-MCNC: 13.5 GM/DL (ref 12–16)
IMM GRANULOCYTES # BLD AUTO: 0.04 K/UL (ref 0–0.04)
IMM GRANULOCYTES NFR BLD AUTO: 0.3 % (ref 0–0.5)
LYMPHOCYTES # BLD AUTO: 4.23 K/UL (ref 1–4.8)
MCH RBC QN AUTO: 26.5 PG (ref 27–31)
MCHC RBC AUTO-ENTMCNC: 31.4 G/DL (ref 32–36)
MCV RBC AUTO: 84 FL (ref 82–98)
NUCLEATED RBC (/100WBC) (OHS): 0 /100 WBC
PLATELET # BLD AUTO: 127 K/UL (ref 150–450)
PMV BLD AUTO: 10.3 FL (ref 9.2–12.9)
POTASSIUM SERPL-SCNC: 4 MMOL/L (ref 3.5–5.1)
PROT SERPL-MCNC: 7.5 GM/DL (ref 6–8.4)
RBC # BLD AUTO: 5.1 M/UL (ref 4–5.4)
RELATIVE EOSINOPHIL (OHS): 2.9 %
RELATIVE LYMPHOCYTE (OHS): 32.9 % (ref 18–48)
RELATIVE MONOCYTE (OHS): 8 % (ref 4–15)
RELATIVE NEUTROPHIL (OHS): 55.4 % (ref 38–73)
SODIUM SERPL-SCNC: 137 MMOL/L (ref 136–145)
WBC # BLD AUTO: 12.85 K/UL (ref 3.9–12.7)

## 2025-06-19 PROCEDURE — 80197 ASSAY OF TACROLIMUS: CPT

## 2025-06-19 PROCEDURE — 85025 COMPLETE CBC W/AUTO DIFF WBC: CPT

## 2025-06-19 PROCEDURE — 36415 COLL VENOUS BLD VENIPUNCTURE: CPT

## 2025-06-19 PROCEDURE — 63600175 PHARM REV CODE 636 W HCPCS: Performed by: NURSE PRACTITIONER

## 2025-06-19 PROCEDURE — 96374 THER/PROPH/DIAG INJ IV PUSH: CPT

## 2025-06-19 PROCEDURE — 80053 COMPREHEN METABOLIC PANEL: CPT

## 2025-06-19 RX ORDER — HEPARIN 100 UNIT/ML
500 SYRINGE INTRAVENOUS
OUTPATIENT
Start: 2025-06-26

## 2025-06-19 RX ORDER — SODIUM FERRIC GLUCONATE COMPLEX IN SUCROSE 12.5 MG/ML
125 INJECTION INTRAVENOUS
Status: COMPLETED | OUTPATIENT
Start: 2025-06-19 | End: 2025-06-19

## 2025-06-19 RX ORDER — SODIUM CHLORIDE 0.9 % (FLUSH) 0.9 %
10 SYRINGE (ML) INJECTION
OUTPATIENT
Start: 2025-06-26

## 2025-06-19 RX ORDER — SODIUM CHLORIDE 0.9 % (FLUSH) 0.9 %
10 SYRINGE (ML) INJECTION
Status: DISCONTINUED | OUTPATIENT
Start: 2025-06-19 | End: 2025-06-19 | Stop reason: HOSPADM

## 2025-06-19 RX ORDER — EPINEPHRINE 0.3 MG/.3ML
0.3 INJECTION SUBCUTANEOUS ONCE AS NEEDED
OUTPATIENT
Start: 2025-06-26

## 2025-06-19 RX ORDER — SODIUM FERRIC GLUCONATE COMPLEX IN SUCROSE 12.5 MG/ML
125 INJECTION INTRAVENOUS
OUTPATIENT
Start: 2025-06-26

## 2025-06-19 RX ADMIN — SODIUM FERRIC GLUCONATE COMPLEX IN SUCROSE 125 MG: 12.5 INJECTION INTRAVENOUS at 08:06

## 2025-06-19 NOTE — PLAN OF CARE
Problem: Adult Inpatient Plan of Care  Goal: Plan of Care Review  Outcome: Progressing  Flowsheets (Taken 6/19/2025 0833)  Plan of Care Reviewed With: patient  Goal: Patient-Specific Goal (Individualized)  Outcome: Progressing  Flowsheets (Taken 6/19/2025 0833)  Individualized Care Needs: Feet elevated in recliner and warm blanket provided for comfort measures  Anxieties, Fears or Concerns: patient reports fatigue and weakness  Patient/Family-Specific Goals (Include Timeframe): tolerate treatment without adverse reaction  Goal: Optimal Comfort and Wellbeing  Outcome: Progressing  Intervention: Provide Person-Centered Care  Flowsheets (Taken 6/19/2025 0833)  Trust Relationship/Rapport:   care explained   questions encouraged   choices provided   reassurance provided   emotional support provided   thoughts/feelings acknowledged   empathic listening provided   questions answered

## 2025-06-19 NOTE — DISCHARGE INSTRUCTIONS
[facility]  [id number] [address]  [address] [address]  [phone] phone     [phone] fax  Hours of Operation: Monday- Friday 8:00am- 5:00pm  After hours phone  [phone]  Hematology / Oncology Physicians on call      JOHANNY Rucker Dr., NP Phaon Dunbar, NP Khelsea Conley, FNP    Please call with any concerns regarding your appointment today.

## 2025-06-20 ENCOUNTER — RESULTS FOLLOW-UP (OUTPATIENT)
Dept: HEPATOLOGY | Facility: CLINIC | Age: 60
End: 2025-06-20
Payer: MEDICAID

## 2025-06-20 DIAGNOSIS — Z94.4 LIVER REPLACED BY TRANSPLANT: Primary | ICD-10-CM

## 2025-06-20 LAB — TACROLIMUS BLD-MCNC: 9.3 NG/ML (ref 5–15)

## 2025-06-20 RX ORDER — TACROLIMUS 0.5 MG/1
CAPSULE ORAL
Qty: 120 CAPSULE | Refills: 11 | Status: SHIPPED | OUTPATIENT
Start: 2025-06-20 | End: 2026-06-21

## 2025-06-20 NOTE — TELEPHONE ENCOUNTER
Patient messaged through patient portal:    Ms. Bazan  Dr. Wild reviewed your labs and they are stable.  Your Prograf level is slightly elevated.  She would like you to decrease you Prograf to 1 mg by mouth twice a day from 1.5 mg twice daily.  She would like to have you repeat labs in two weeks on 6/30/2025.  Should you have any questions or concerns, please reach out to me.  Thanks Essence.           ----- Message from Hailey Wild MD sent at 6/20/2025 10:49 AM CDT -----  Labs reviewed.     If true trough, recommend decreasing tac to 1/1 from 1.5/1.5. Repeat labs in 2 weeks.    Please include GGT and PETH with next set of labs. Thanks.      ----- Message -----  From: Lab, Background User  Sent: 6/19/2025   3:08 PM CDT  To: Hailey Wild MD

## 2025-06-26 ENCOUNTER — TELEPHONE (OUTPATIENT)
Dept: INFUSION THERAPY | Facility: HOSPITAL | Age: 60
End: 2025-06-26
Payer: MEDICAID

## 2025-06-26 NOTE — TELEPHONE ENCOUNTER
Returned patient's call. Patient states she overslept and cannot make her infusion today.  I suggested placing this infusion she missed at the end of the series and reminded her the  date and time of her next one. She acknowledged and infusion rescheduled. Call ended well.         Copied from CRM #[id number]. Topic: Appointments - Appointment Access  >> Jun 26, 2025  7:42 AM Bhavya wrote:  .Type:  Patient Requesting Call    Who Called:Marcie Bazan  Would the patient rather a call back or a response via [facility]? Call  Best Call Back Number:.[phone]  Additional Information: Patient called to discuss reschedule appointment - Time no longer works

## 2025-07-11 DIAGNOSIS — Z94.4 LIVER REPLACED BY TRANSPLANT: Primary | ICD-10-CM

## 2025-07-14 DIAGNOSIS — M79.18 PIRIFORMIS MUSCLE PAIN: ICD-10-CM

## 2025-07-14 DIAGNOSIS — G95.9 CERVICAL MYELOPATHY: ICD-10-CM

## 2025-07-14 DIAGNOSIS — M54.16 LUMBAR RADICULOPATHY: ICD-10-CM

## 2025-07-14 DIAGNOSIS — G47.01 INSOMNIA SECONDARY TO CHRONIC PAIN: ICD-10-CM

## 2025-07-14 DIAGNOSIS — G89.29 INSOMNIA SECONDARY TO CHRONIC PAIN: ICD-10-CM

## 2025-07-14 DIAGNOSIS — M50.30 DDD (DEGENERATIVE DISC DISEASE), CERVICAL: ICD-10-CM

## 2025-07-14 DIAGNOSIS — G89.4 CHRONIC PAIN SYNDROME: ICD-10-CM

## 2025-07-14 DIAGNOSIS — M54.16 BILATERAL LUMBAR RADICULOPATHY: ICD-10-CM

## 2025-07-15 RX ORDER — CYCLOBENZAPRINE HCL 10 MG
TABLET ORAL
Qty: 90 TABLET | Refills: 5 | OUTPATIENT
Start: 2025-07-15

## 2025-07-16 RX ORDER — CYCLOBENZAPRINE HCL 10 MG
TABLET ORAL
Qty: 90 TABLET | Refills: 1 | Status: SHIPPED | OUTPATIENT
Start: 2025-07-16

## 2025-07-16 NOTE — TELEPHONE ENCOUNTER
Pt is requesting for a refill of: cyclobenzaprine (FLEXERIL) 10 MG tablet   Last filled:612/25   Last encounter: 5/26/25   Up coming apt: 6 months follow-up for medication refill - virtual visit   Pharmacy:[id number] - [place] - [address] AT [facility]

## 2025-07-17 ENCOUNTER — TELEPHONE (OUTPATIENT)
Dept: INFUSION THERAPY | Facility: HOSPITAL | Age: 60
End: 2025-07-17
Payer: MEDICAID

## 2025-07-17 NOTE — TELEPHONE ENCOUNTER
Pt forgot she has appt today. Pt is informed of next week appt. Today missed infusion will be added to the end of the series.

## 2025-07-24 ENCOUNTER — INFUSION (OUTPATIENT)
Dept: INFUSION THERAPY | Facility: HOSPITAL | Age: 60
End: 2025-07-24
Attending: NURSE PRACTITIONER
Payer: MEDICAID

## 2025-07-24 VITALS
DIASTOLIC BLOOD PRESSURE: 72 MMHG | RESPIRATION RATE: 18 BRPM | SYSTOLIC BLOOD PRESSURE: 111 MMHG | HEART RATE: 98 BPM | OXYGEN SATURATION: 98 % | TEMPERATURE: 98 F

## 2025-07-24 DIAGNOSIS — E61.1 IRON DEFICIENCY: Primary | ICD-10-CM

## 2025-07-24 PROCEDURE — 25000003 PHARM REV CODE 250: Performed by: NURSE PRACTITIONER

## 2025-07-24 PROCEDURE — 63600175 PHARM REV CODE 636 W HCPCS: Performed by: NURSE PRACTITIONER

## 2025-07-24 PROCEDURE — 96374 THER/PROPH/DIAG INJ IV PUSH: CPT

## 2025-07-24 PROCEDURE — A4216 STERILE WATER/SALINE, 10 ML: HCPCS | Performed by: NURSE PRACTITIONER

## 2025-07-24 RX ORDER — HEPARIN 100 UNIT/ML
500 SYRINGE INTRAVENOUS
OUTPATIENT
Start: 2025-07-31

## 2025-07-24 RX ORDER — SODIUM CHLORIDE 0.9 % (FLUSH) 0.9 %
10 SYRINGE (ML) INJECTION
OUTPATIENT
Start: 2025-07-31

## 2025-07-24 RX ORDER — SODIUM FERRIC GLUCONATE COMPLEX IN SUCROSE 12.5 MG/ML
125 INJECTION INTRAVENOUS
OUTPATIENT
Start: 2025-07-31

## 2025-07-24 RX ORDER — EPINEPHRINE 0.3 MG/.3ML
0.3 INJECTION SUBCUTANEOUS ONCE AS NEEDED
OUTPATIENT
Start: 2025-07-31

## 2025-07-24 RX ORDER — SODIUM FERRIC GLUCONATE COMPLEX IN SUCROSE 12.5 MG/ML
125 INJECTION INTRAVENOUS
Status: COMPLETED | OUTPATIENT
Start: 2025-07-24 | End: 2025-07-24

## 2025-07-24 RX ORDER — SODIUM CHLORIDE 0.9 % (FLUSH) 0.9 %
10 SYRINGE (ML) INJECTION
Status: DISCONTINUED | OUTPATIENT
Start: 2025-07-24 | End: 2025-07-24 | Stop reason: HOSPADM

## 2025-07-24 RX ADMIN — SODIUM FERRIC GLUCONATE COMPLEX IN SUCROSE 125 MG: 12.5 INJECTION INTRAVENOUS at 08:07

## 2025-07-24 RX ADMIN — Medication 10 ML: at 08:07

## 2025-07-24 NOTE — NURSING
Infusion # 2/4 - Ferrlecit 125 mg   Last dose- 6/19/25     Premeds-none     Ferrlecit 125 mg administered IV at a 10 minute rate per orders; see MAR and vitals for more details. Monitored for 30 minutes post infusion for any s/sx of reaction. Tolerated well without adverse events, discharged and ambulatory out of clinic.

## 2025-07-24 NOTE — PLAN OF CARE
Plan of care reviewed with patient. Discussed if there are any new or ongoing concerns. Denies.   Problem: Adult Inpatient Plan of Care  Goal: Plan of Care Review  Outcome: Progressing  Flowsheets (Taken 7/24/2025 0835)  Plan of Care Reviewed With: patient  Goal: Patient-Specific Goal (Individualized)  Outcome: Progressing  Goal: Optimal Comfort and Wellbeing  Outcome: Progressing  Intervention: Provide Person-Centered Care  Flowsheets (Taken 7/24/2025 0835)  Trust Relationship/Rapport:   care explained   questions encouraged   choices provided   reassurance provided   emotional support provided   thoughts/feelings acknowledged   empathic listening provided   questions answered  Goal: Absence of Hospital-Acquired Illness or Injury  Outcome: Progressing

## 2025-07-27 DIAGNOSIS — J31.0 CHRONIC RHINITIS: ICD-10-CM

## 2025-07-27 NOTE — TELEPHONE ENCOUNTER
No care due was identified.  [facility] Embedded Care Due Messages. Reference number: [id number].   7/27/2025 11:06:41 AM CDT

## 2025-07-28 RX ORDER — LEVOCETIRIZINE DIHYDROCHLORIDE 5 MG/1
5 TABLET, FILM COATED ORAL NIGHTLY
Qty: 90 TABLET | Refills: 2 | Status: SHIPPED | OUTPATIENT
Start: 2025-07-28

## 2025-07-28 NOTE — TELEPHONE ENCOUNTER
Refill Decision Note   Marcie Bazan  is requesting a refill authorization.  Brief Assessment and Rationale for Refill:  Approve     Medication Therapy Plan:  Reviewed acute care/admission visit notes; No follow up with PCP recommended by acute care provider; Approved per protocol      Extended chart review required: Yes   Comments:     Note composed:12:20 PM 07/28/2025

## 2025-07-31 ENCOUNTER — PATIENT MESSAGE (OUTPATIENT)
Dept: INTERNAL MEDICINE | Facility: CLINIC | Age: 60
End: 2025-07-31
Payer: MEDICAID

## 2025-07-31 DIAGNOSIS — G89.29 CHRONIC BILATERAL LOW BACK PAIN WITH RIGHT-SIDED SCIATICA: Chronic | ICD-10-CM

## 2025-07-31 DIAGNOSIS — M54.41 CHRONIC BILATERAL LOW BACK PAIN WITH RIGHT-SIDED SCIATICA: Chronic | ICD-10-CM

## 2025-08-01 RX ORDER — DICLOFENAC SODIUM 10 MG/G
GEL TOPICAL
Qty: 200 G | Refills: 5 | Status: SHIPPED | OUTPATIENT
Start: 2025-08-01

## 2025-08-01 NOTE — TELEPHONE ENCOUNTER
REFILL REQUEST APPROVED.  Requested Prescriptions     Pending Prescriptions Disp Refills    diclofenac sodium (VOLTAREN) 1 % Gel 200 g 5     Sig: Apply to painful joint area four times a day as needed

## 2025-08-01 NOTE — TELEPHONE ENCOUNTER
No care due was identified.  [facility] Embedded Care Due Messages. Reference number: [id number].   7/31/2025 7:56:57 PM CDT

## 2025-08-01 NOTE — TELEPHONE ENCOUNTER
Refill Routing Note   Medication(s) are not appropriate for processing by [facility] for the following reason(s):        Outside of protocol    [facility] action(s):  Route               Appointments  past 12m or future 3m with PCP    Date Provider   Last Visit   2/25/2025 ETHAN Munoz MD   Next Visit   Visit date not found ETHAN Munoz MD   ED visits in past 90 days: 0        Note composed:8:24 AM 08/01/2025

## 2025-08-02 ENCOUNTER — PATIENT MESSAGE (OUTPATIENT)
Dept: INTERNAL MEDICINE | Facility: CLINIC | Age: 60
End: 2025-08-02
Payer: MEDICAID

## 2025-08-02 DIAGNOSIS — E83.42 HYPOMAGNESEMIA: ICD-10-CM

## 2025-08-04 NOTE — TELEPHONE ENCOUNTER
No care due was identified.  [facility] Embedded Care Due Messages. Reference number: [id number].   8/04/2025 8:49:08 AM CDT

## 2025-08-04 NOTE — TELEPHONE ENCOUNTER
Refill Routing Note   Medication(s) are not appropriate for processing by [facility] for the following reason(s):        Outside of protocol    [facility] action(s):  Route               Appointments  past 12m or future 3m with PCP    Date Provider   Last Visit   2/25/2025 ETHAN Munoz MD   Next Visit   Visit date not found ETHAN Munoz MD   ED visits in past 90 days: 0        Note composed:9:05 AM 08/04/2025

## 2025-08-07 DIAGNOSIS — I10 ESSENTIAL HYPERTENSION: Chronic | ICD-10-CM

## 2025-08-07 RX ORDER — LANOLIN ALCOHOL/MO/W.PET/CERES
1 CREAM (GRAM) TOPICAL DAILY
Qty: 90 TABLET | Refills: 3 | Status: SHIPPED | OUTPATIENT
Start: 2025-08-07

## 2025-08-07 NOTE — TELEPHONE ENCOUNTER
REFILL REQUEST APPROVED.  Requested Prescriptions     Pending Prescriptions Disp Refills    magnesium oxide (MAG-OX) 400 mg (241.3 mg magnesium) tablet 90 tablet 3     Sig: Take 1 tablet (400 mg total) by mouth once daily.

## 2025-08-08 ENCOUNTER — TELEPHONE (OUTPATIENT)
Dept: TRANSPLANT | Facility: CLINIC | Age: 60
End: 2025-08-08
Payer: MEDICAID

## 2025-08-12 RX ORDER — VALSARTAN 80 MG/1
80 TABLET ORAL DAILY
Qty: 90 TABLET | Refills: 1 | Status: SHIPPED | OUTPATIENT
Start: 2025-08-12 | End: 2026-02-08

## 2025-08-13 ENCOUNTER — OFFICE VISIT (OUTPATIENT)
Dept: PULMONOLOGY | Facility: CLINIC | Age: 60
End: 2025-08-13
Payer: MEDICAID

## 2025-08-13 VITALS
SYSTOLIC BLOOD PRESSURE: 110 MMHG | HEIGHT: 65 IN | DIASTOLIC BLOOD PRESSURE: 72 MMHG | HEART RATE: 95 BPM | RESPIRATION RATE: 15 BRPM | BODY MASS INDEX: 38.75 KG/M2 | OXYGEN SATURATION: 96 % | WEIGHT: 232.56 LBS

## 2025-08-13 DIAGNOSIS — G47.33 OSA ON CPAP: Chronic | ICD-10-CM

## 2025-08-13 DIAGNOSIS — T17.308A CHOKING, INITIAL ENCOUNTER: ICD-10-CM

## 2025-08-13 DIAGNOSIS — K22.4 ESOPHAGEAL SPASM: ICD-10-CM

## 2025-08-13 DIAGNOSIS — R06.02 SOB (SHORTNESS OF BREATH) ON EXERTION: ICD-10-CM

## 2025-08-13 DIAGNOSIS — R51.9 INTRACTABLE HEADACHE, UNSPECIFIED CHRONICITY PATTERN, UNSPECIFIED HEADACHE TYPE: Primary | ICD-10-CM

## 2025-08-13 PROCEDURE — 1159F MED LIST DOCD IN RCRD: CPT | Mod: CPTII,,, | Performed by: HOSPITALIST

## 2025-08-13 PROCEDURE — 1160F RVW MEDS BY RX/DR IN RCRD: CPT | Mod: CPTII,,, | Performed by: HOSPITALIST

## 2025-08-13 PROCEDURE — 3044F HG A1C LEVEL LT 7.0%: CPT | Mod: CPTII,,, | Performed by: HOSPITALIST

## 2025-08-13 PROCEDURE — 99214 OFFICE O/P EST MOD 30 MIN: CPT | Mod: S$PBB,,, | Performed by: HOSPITALIST

## 2025-08-13 PROCEDURE — 3074F SYST BP LT 130 MM HG: CPT | Mod: CPTII,,, | Performed by: HOSPITALIST

## 2025-08-13 PROCEDURE — 3008F BODY MASS INDEX DOCD: CPT | Mod: CPTII,,, | Performed by: HOSPITALIST

## 2025-08-13 PROCEDURE — 3078F DIAST BP <80 MM HG: CPT | Mod: CPTII,,, | Performed by: HOSPITALIST

## 2025-08-13 PROCEDURE — 4010F ACE/ARB THERAPY RXD/TAKEN: CPT | Mod: CPTII,,, | Performed by: HOSPITALIST

## 2025-08-13 PROCEDURE — 99999 PR PBB SHADOW E&M-EST. PATIENT-LVL V: CPT | Mod: PBBFAC,,, | Performed by: HOSPITALIST

## 2025-08-13 PROCEDURE — 99215 OFFICE O/P EST HI 40 MIN: CPT | Mod: PBBFAC | Performed by: HOSPITALIST

## 2025-08-15 ENCOUNTER — INFUSION (OUTPATIENT)
Dept: INFUSION THERAPY | Facility: HOSPITAL | Age: 60
End: 2025-08-15
Attending: NURSE PRACTITIONER
Payer: MEDICAID

## 2025-08-15 VITALS
HEART RATE: 84 BPM | SYSTOLIC BLOOD PRESSURE: 123 MMHG | RESPIRATION RATE: 18 BRPM | DIASTOLIC BLOOD PRESSURE: 69 MMHG | OXYGEN SATURATION: 95 % | TEMPERATURE: 99 F

## 2025-08-15 DIAGNOSIS — E61.1 IRON DEFICIENCY: Primary | ICD-10-CM

## 2025-08-15 PROCEDURE — 96374 THER/PROPH/DIAG INJ IV PUSH: CPT

## 2025-08-15 PROCEDURE — 63600175 PHARM REV CODE 636 W HCPCS: Performed by: NURSE PRACTITIONER

## 2025-08-15 RX ORDER — EPINEPHRINE 0.3 MG/.3ML
0.3 INJECTION SUBCUTANEOUS ONCE AS NEEDED
OUTPATIENT
Start: 2025-08-21

## 2025-08-15 RX ORDER — SODIUM CHLORIDE 0.9 % (FLUSH) 0.9 %
10 SYRINGE (ML) INJECTION
OUTPATIENT
Start: 2025-08-21

## 2025-08-15 RX ORDER — SODIUM FERRIC GLUCONATE COMPLEX IN SUCROSE 12.5 MG/ML
125 INJECTION INTRAVENOUS
Status: COMPLETED | OUTPATIENT
Start: 2025-08-15 | End: 2025-08-15

## 2025-08-15 RX ORDER — SODIUM FERRIC GLUCONATE COMPLEX IN SUCROSE 12.5 MG/ML
125 INJECTION INTRAVENOUS
OUTPATIENT
Start: 2025-08-21

## 2025-08-15 RX ORDER — HEPARIN 100 UNIT/ML
500 SYRINGE INTRAVENOUS
OUTPATIENT
Start: 2025-08-21

## 2025-08-15 RX ADMIN — SODIUM FERRIC GLUCONATE COMPLEX IN SUCROSE 125 MG: 12.5 INJECTION INTRAVENOUS at 02:08

## 2025-08-19 ENCOUNTER — HOSPITAL ENCOUNTER (EMERGENCY)
Facility: HOSPITAL | Age: 60
Discharge: HOME | End: 2025-08-19
Attending: EMERGENCY MEDICINE
Payer: MEDICAID

## 2025-08-19 ENCOUNTER — HOSPITAL ENCOUNTER (OUTPATIENT)
Dept: CARDIOLOGY | Facility: HOSPITAL | Age: 60
Discharge: HOME | End: 2025-08-19
Attending: INTERNAL MEDICINE
Payer: MEDICAID

## 2025-08-19 ENCOUNTER — TELEPHONE (OUTPATIENT)
Dept: CARDIOLOGY | Facility: CLINIC | Age: 60
End: 2025-08-19

## 2025-08-19 ENCOUNTER — NURSE TRIAGE (OUTPATIENT)
Dept: ADMINISTRATIVE | Facility: CLINIC | Age: 60
End: 2025-08-19
Payer: MEDICAID

## 2025-08-19 ENCOUNTER — OFFICE VISIT (OUTPATIENT)
Dept: CARDIOLOGY | Facility: CLINIC | Age: 60
End: 2025-08-19
Payer: MEDICAID

## 2025-08-19 VITALS — HEART RATE: 103 BPM | SYSTOLIC BLOOD PRESSURE: 128 MMHG | DIASTOLIC BLOOD PRESSURE: 80 MMHG | OXYGEN SATURATION: 98 %

## 2025-08-19 DIAGNOSIS — I10 ESSENTIAL HYPERTENSION: ICD-10-CM

## 2025-08-19 DIAGNOSIS — R60.0 BILATERAL LEG EDEMA: ICD-10-CM

## 2025-08-19 DIAGNOSIS — R42 DIZZINESS: ICD-10-CM

## 2025-08-19 DIAGNOSIS — E66.01 CLASS 2 SEVERE OBESITY DUE TO EXCESS CALORIES WITH SERIOUS COMORBIDITY AND BODY MASS INDEX (BMI) OF 36.0 TO 36.9 IN ADULT: ICD-10-CM

## 2025-08-19 DIAGNOSIS — E78.2 MIXED HYPERLIPIDEMIA: ICD-10-CM

## 2025-08-19 DIAGNOSIS — Z94.4 LIVER REPLACED BY TRANSPLANT: ICD-10-CM

## 2025-08-19 DIAGNOSIS — D50.9 IRON DEFICIENCY ANEMIA, UNSPECIFIED IRON DEFICIENCY ANEMIA TYPE: ICD-10-CM

## 2025-08-19 DIAGNOSIS — R06.02 SHORTNESS OF BREATH: Primary | ICD-10-CM

## 2025-08-19 DIAGNOSIS — R94.31 NONSPECIFIC ABNORMAL ELECTROCARDIOGRAM (ECG) (EKG): ICD-10-CM

## 2025-08-19 DIAGNOSIS — R07.9 CHEST PAIN, UNSPECIFIED TYPE: ICD-10-CM

## 2025-08-19 DIAGNOSIS — G47.33 OSA ON CPAP: ICD-10-CM

## 2025-08-19 DIAGNOSIS — E66.812 CLASS 2 SEVERE OBESITY DUE TO EXCESS CALORIES WITH SERIOUS COMORBIDITY AND BODY MASS INDEX (BMI) OF 36.0 TO 36.9 IN ADULT: ICD-10-CM

## 2025-08-19 DIAGNOSIS — E55.9 VITAMIN D DEFICIENCY: ICD-10-CM

## 2025-08-19 DIAGNOSIS — G89.4 CHRONIC PAIN SYNDROME: ICD-10-CM

## 2025-08-19 DIAGNOSIS — R06.02 SHORTNESS OF BREATH: ICD-10-CM

## 2025-08-19 DIAGNOSIS — Z86.16 HISTORY OF COVID-19: ICD-10-CM

## 2025-08-19 DIAGNOSIS — R06.02 SOB (SHORTNESS OF BREATH) ON EXERTION: Primary | ICD-10-CM

## 2025-08-19 DIAGNOSIS — D63.8 ANEMIA OF CHRONIC DISEASE: ICD-10-CM

## 2025-08-19 LAB
OHS QRS DURATION: 72 MS
OHS QTC CALCULATION: 428 MS

## 2025-08-19 PROCEDURE — 93005 ELECTROCARDIOGRAM TRACING: CPT

## 2025-08-19 PROCEDURE — 3044F HG A1C LEVEL LT 7.0%: CPT | Mod: CPTII,,, | Performed by: INTERNAL MEDICINE

## 2025-08-19 PROCEDURE — 99215 OFFICE O/P EST HI 40 MIN: CPT | Mod: S$PBB,,, | Performed by: INTERNAL MEDICINE

## 2025-08-19 PROCEDURE — 93010 ELECTROCARDIOGRAM REPORT: CPT | Mod: ,,, | Performed by: INTERNAL MEDICINE

## 2025-08-19 PROCEDURE — G2211 COMPLEX E/M VISIT ADD ON: HCPCS | Mod: ,,, | Performed by: INTERNAL MEDICINE

## 2025-08-19 PROCEDURE — 99212 OFFICE O/P EST SF 10 MIN: CPT | Mod: PBBFAC,25 | Performed by: INTERNAL MEDICINE

## 2025-08-19 PROCEDURE — 3074F SYST BP LT 130 MM HG: CPT | Mod: CPTII,,, | Performed by: INTERNAL MEDICINE

## 2025-08-19 PROCEDURE — 3079F DIAST BP 80-89 MM HG: CPT | Mod: CPTII,,, | Performed by: INTERNAL MEDICINE

## 2025-08-19 PROCEDURE — 4010F ACE/ARB THERAPY RXD/TAKEN: CPT | Mod: CPTII,,, | Performed by: INTERNAL MEDICINE

## 2025-08-19 PROCEDURE — 99999 PR PBB SHADOW E&M-EST. PATIENT-LVL II: CPT | Mod: PBBFAC,,, | Performed by: INTERNAL MEDICINE

## 2025-08-21 ENCOUNTER — PATIENT MESSAGE (OUTPATIENT)
Dept: TRANSPLANT | Facility: CLINIC | Age: 60
End: 2025-08-21

## 2025-08-22 ENCOUNTER — TELEPHONE (OUTPATIENT)
Dept: OPHTHALMOLOGY | Facility: CLINIC | Age: 60
End: 2025-08-22
Payer: MEDICAID

## 2025-08-26 ENCOUNTER — INFUSION (OUTPATIENT)
Dept: INFUSION THERAPY | Facility: HOSPITAL | Age: 60
End: 2025-08-26
Attending: NURSE PRACTITIONER
Payer: MEDICAID

## 2025-08-26 ENCOUNTER — RESULTS FOLLOW-UP (OUTPATIENT)
Dept: TRANSPLANT | Facility: CLINIC | Age: 60
End: 2025-08-26
Payer: MEDICAID

## 2025-08-26 ENCOUNTER — LAB VISIT (OUTPATIENT)
Dept: LAB | Facility: HOSPITAL | Age: 60
End: 2025-08-26
Attending: PSYCHIATRY & NEUROLOGY
Payer: MEDICAID

## 2025-08-26 VITALS
SYSTOLIC BLOOD PRESSURE: 109 MMHG | RESPIRATION RATE: 20 BRPM | DIASTOLIC BLOOD PRESSURE: 68 MMHG | TEMPERATURE: 97 F | OXYGEN SATURATION: 95 % | HEART RATE: 84 BPM

## 2025-08-26 DIAGNOSIS — Z94.4 LIVER REPLACED BY TRANSPLANT: Primary | ICD-10-CM

## 2025-08-26 DIAGNOSIS — E61.1 IRON DEFICIENCY: Primary | ICD-10-CM

## 2025-08-26 PROCEDURE — 96374 THER/PROPH/DIAG INJ IV PUSH: CPT

## 2025-08-26 PROCEDURE — 63600175 PHARM REV CODE 636 W HCPCS: Performed by: NURSE PRACTITIONER

## 2025-08-26 RX ORDER — HEPARIN 100 UNIT/ML
500 SYRINGE INTRAVENOUS
OUTPATIENT
Start: 2025-08-26

## 2025-08-26 RX ORDER — EPINEPHRINE 0.3 MG/.3ML
0.3 INJECTION SUBCUTANEOUS ONCE AS NEEDED
OUTPATIENT
Start: 2025-08-26 | End: 2037-01-21

## 2025-08-26 RX ORDER — TIRZEPATIDE 2.5 MG/0.1
SYRINGE (ML) SUBCUTANEOUS WEEKLY
COMMUNITY

## 2025-08-26 RX ORDER — SODIUM FERRIC GLUCONATE COMPLEX IN SUCROSE 12.5 MG/ML
125 INJECTION INTRAVENOUS
Status: COMPLETED | OUTPATIENT
Start: 2025-08-26 | End: 2025-08-26

## 2025-08-26 RX ORDER — SODIUM CHLORIDE 0.9 % (FLUSH) 0.9 %
10 SYRINGE (ML) INJECTION
OUTPATIENT
Start: 2025-08-26

## 2025-08-26 RX ORDER — SODIUM FERRIC GLUCONATE COMPLEX IN SUCROSE 12.5 MG/ML
125 INJECTION INTRAVENOUS
Status: CANCELLED | OUTPATIENT
Start: 2025-08-26 | End: 2025-08-26

## 2025-08-26 RX ADMIN — SODIUM FERRIC GLUCONATE COMPLEX IN SUCROSE 125 MG: 12.5 INJECTION INTRAVENOUS at 08:08

## 2025-09-04 DIAGNOSIS — Z94.4 LIVER REPLACED BY TRANSPLANT: Primary | ICD-10-CM

## (undated) DEVICE — DRAIN CHANNEL ROUND 19FR

## (undated) DEVICE — SOL BETADINE 5%

## (undated) DEVICE — HANDSET ARGON PLUS

## (undated) DEVICE — SUT 1 36IN PDS II VIO MONO

## (undated) DEVICE — PAD K-THERMIA 24IN X 60IN

## (undated) DEVICE — COVER OVERHEAD SURG LT BLUE

## (undated) DEVICE — DUOVISC

## (undated) DEVICE — SEE MEDLINE ITEM 152622

## (undated) DEVICE — GLOVE BIOGEL ECLIPSE SZ 6.5

## (undated) DEVICE — CONTAINER SPECIMEN STRL 4OZ

## (undated) DEVICE — NDL SAFETY 25G X 1.5 ECLIPSE

## (undated) DEVICE — SET DECANTER MEDICHOICE

## (undated) DEVICE — CATH URETHRAL RED RUBBER 18FR

## (undated) DEVICE — TRAY FOLEY 16FR INFECTION CONT

## (undated) DEVICE — SUT PROLENE 5-0 36 BB BB BL

## (undated) DEVICE — SUT SILK 3-0 STRANDS 30IN

## (undated) DEVICE — GLASSES EYE PROTECTIVE

## (undated) DEVICE — SUT 2-0 12-18IN SILK

## (undated) DEVICE — UNDERGLOVE BIOGEL PI SZ 6.5 LF

## (undated) DEVICE — GLOVE SURG BIOGEL LATEX SZ 7.5

## (undated) DEVICE — SUT 7/0 24IN PROLENE BL MO

## (undated) DEVICE — SUT SILK 3-0 SH 18IN BLACK

## (undated) DEVICE — SUT 3-0 12-18IN SILK

## (undated) DEVICE — GAUZE SPONGE 4X4 12PLY

## (undated) DEVICE — STAPLER SKIN PROXIMATE WIDE

## (undated) DEVICE — DRESSING ADH ISLAND 3.6 X 14

## (undated) DEVICE — SEE MEDLINE ITEM 152739

## (undated) DEVICE — SET EXTENSION STERILE 30IN

## (undated) DEVICE — SOL NS 1000CC

## (undated) DEVICE — SEE MEDLINE ITEM 157117

## (undated) DEVICE — PANTIES FEMININE NAPKIN LG/XLG

## (undated) DEVICE — SYR LUER LOCK 1CC

## (undated) DEVICE — MANIFOLD 4 PORT

## (undated) DEVICE — ELECTRODE PAD DEFIB STERILE

## (undated) DEVICE — PAD ABD 8X10 STERILE

## (undated) DEVICE — SEE MEDLINE ITEM 157181

## (undated) DEVICE — SUT PROLENE 4-0 SH BLU 36IN

## (undated) DEVICE — NDL MONOPTY BIOPSY 14GX10CM

## (undated) DEVICE — TOWEL OR XRAY WHITE 17X26IN

## (undated) DEVICE — EVACUATOR WOUND BULB 100CC

## (undated) DEVICE — ELECTRODE REM PLYHSV RETURN 9

## (undated) DEVICE — SYR 10CC LUER LOCK

## (undated) DEVICE — KIT SAHARA DRAPE DRAW/LIFT

## (undated) DEVICE — DRESSING TRANS 2X2 TEGADERM

## (undated) DEVICE — SUT PROLENE 3-0 SH DA 36 BL

## (undated) DEVICE — SUT PDS BV 6-0

## (undated) DEVICE — SUT PROLENE 5-0 36IN C-1

## (undated) DEVICE — SEE MEDLINE ITEM 157027

## (undated) DEVICE — DRESSING AQUACEL SACRAL 9 X 9

## (undated) DEVICE — SUT 4-0 12-30IN SILK

## (undated) DEVICE — SUT VICRYL 3-0 27 SH

## (undated) DEVICE — SUT 4-0 12-18IN SILK BLACK

## (undated) DEVICE — DRAPE OPHTHALMIC 48X62 FEN

## (undated) DEVICE — SUT CHROMIC 3-0 SH 27IN GUT

## (undated) DEVICE — SEE MEDLINE ITEM 146417

## (undated) DEVICE — PACK DRAPE PERI/GYN TIBURON

## (undated) DEVICE — SYR ONLY LUER LOCK 20CC

## (undated) DEVICE — SUT SILK 0 STRANDS 30IN BLK

## (undated) DEVICE — COVER LIGHT HANDLE 80/CA

## (undated) DEVICE — SUT PROLENE 6-0 BV-1 30IN

## (undated) DEVICE — SEE L#152161

## (undated) DEVICE — Device

## (undated) DEVICE — UNDERGLOVES BIOGEL PI SIZE 7.5

## (undated) DEVICE — GOWN SURGICAL X-LARGE

## (undated) DEVICE — SEE MEDLINE ITEM 156901

## (undated) DEVICE — BOOT AIR FLUID HEEL ADLT STD

## (undated) DEVICE — TAPE SILK 3IN

## (undated) DEVICE — SEE MEDLINE ITEM 146420

## (undated) DEVICE — HEMOSTAT SURGICEL NU-KNIT 6X9

## (undated) DEVICE — SUT SILK 2-0 STRANDS 30IN

## (undated) DEVICE — SET IV ADMIN 15DROP 3 CARESITE

## (undated) DEVICE — CLIP SPRING 6MM